# Patient Record
Sex: FEMALE | Race: WHITE | NOT HISPANIC OR LATINO | Employment: OTHER | URBAN - METROPOLITAN AREA
[De-identification: names, ages, dates, MRNs, and addresses within clinical notes are randomized per-mention and may not be internally consistent; named-entity substitution may affect disease eponyms.]

---

## 2017-03-16 ENCOUNTER — HOSPITAL ENCOUNTER (OUTPATIENT)
Dept: RADIOLOGY | Facility: MEDICAL CENTER | Age: 62
End: 2017-03-16
Attending: FAMILY MEDICINE
Payer: MEDICARE

## 2017-03-16 DIAGNOSIS — Z13.9 SCREENING: ICD-10-CM

## 2017-03-16 PROCEDURE — 77063 BREAST TOMOSYNTHESIS BI: CPT

## 2017-03-17 ENCOUNTER — HOSPITAL ENCOUNTER (OUTPATIENT)
Dept: LAB | Facility: MEDICAL CENTER | Age: 62
End: 2017-03-17
Attending: FAMILY MEDICINE
Payer: MEDICARE

## 2017-03-17 LAB
ALBUMIN SERPL BCP-MCNC: 4.1 G/DL (ref 3.2–4.9)
ALBUMIN/GLOB SERPL: 1.2 G/DL
ALP SERPL-CCNC: 112 U/L (ref 30–99)
ALT SERPL-CCNC: 32 U/L (ref 2–50)
ANION GAP SERPL CALC-SCNC: 8 MMOL/L (ref 0–11.9)
AST SERPL-CCNC: 20 U/L (ref 12–45)
BASOPHILS # BLD AUTO: 0.07 K/UL (ref 0–0.12)
BASOPHILS NFR BLD AUTO: 1.1 % (ref 0–1.8)
BILIRUB SERPL-MCNC: 0.6 MG/DL (ref 0.1–1.5)
BUN SERPL-MCNC: 16 MG/DL (ref 8–22)
CALCIUM SERPL-MCNC: 9.1 MG/DL (ref 8.5–10.5)
CHLORIDE SERPL-SCNC: 103 MMOL/L (ref 96–112)
CHOLEST SERPL-MCNC: 194 MG/DL (ref 100–199)
CO2 SERPL-SCNC: 27 MMOL/L (ref 20–33)
CREAT SERPL-MCNC: 1.01 MG/DL (ref 0.5–1.4)
EOSINOPHIL # BLD: 0.14 K/UL (ref 0–0.51)
EOSINOPHIL NFR BLD AUTO: 2.3 % (ref 0–6.9)
ERYTHROCYTE [DISTWIDTH] IN BLOOD BY AUTOMATED COUNT: 47.8 FL (ref 35.9–50)
EST. AVERAGE GLUCOSE BLD GHB EST-MCNC: 232 MG/DL
GLOBULIN SER CALC-MCNC: 3.4 G/DL (ref 1.9–3.5)
GLUCOSE SERPL-MCNC: 207 MG/DL (ref 65–99)
HBA1C MFR BLD: 9.7 % (ref 0–5.6)
HCT VFR BLD AUTO: 41.8 % (ref 37–47)
HDLC SERPL-MCNC: 68 MG/DL
HGB BLD-MCNC: 13.4 G/DL (ref 12–16)
IMM GRANULOCYTES # BLD AUTO: 0.01 K/UL (ref 0–0.11)
IMM GRANULOCYTES NFR BLD AUTO: 0.2 % (ref 0–0.9)
LDLC SERPL CALC-MCNC: 106 MG/DL
LYMPHOCYTES # BLD: 1.42 K/UL (ref 1–4.8)
LYMPHOCYTES NFR BLD AUTO: 23 % (ref 22–41)
MCH RBC QN AUTO: 30.7 PG (ref 27–33)
MCHC RBC AUTO-ENTMCNC: 32.1 G/DL (ref 33.6–35)
MCV RBC AUTO: 95.7 FL (ref 81.4–97.8)
MONOCYTES # BLD: 0.49 K/UL (ref 0–0.85)
MONOCYTES NFR BLD AUTO: 7.9 % (ref 0–13.4)
NEUTROPHILS # BLD: 4.04 K/UL (ref 2–7.15)
NEUTROPHILS NFR BLD AUTO: 65.5 % (ref 44–72)
NRBC # BLD AUTO: 0 K/UL
NRBC BLD-RTO: 0 /100 WBC
PLATELET # BLD AUTO: 332 K/UL (ref 164–446)
PMV BLD AUTO: 9.9 FL (ref 9–12.9)
POTASSIUM SERPL-SCNC: 4.3 MMOL/L (ref 3.6–5.5)
PROT SERPL-MCNC: 7.5 G/DL (ref 6–8.2)
RBC # BLD AUTO: 4.37 M/UL (ref 4.2–5.4)
SODIUM SERPL-SCNC: 138 MMOL/L (ref 135–145)
TRIGL SERPL-MCNC: 102 MG/DL (ref 0–149)
TSH SERPL DL<=0.005 MIU/L-ACNC: 21.46 UIU/ML (ref 0.3–3.7)
WBC # BLD AUTO: 6.2 K/UL (ref 4.8–10.8)

## 2017-03-17 PROCEDURE — 80061 LIPID PANEL: CPT

## 2017-03-17 PROCEDURE — 83036 HEMOGLOBIN GLYCOSYLATED A1C: CPT

## 2017-03-17 PROCEDURE — 80053 COMPREHEN METABOLIC PANEL: CPT

## 2017-03-17 PROCEDURE — 84443 ASSAY THYROID STIM HORMONE: CPT

## 2017-03-17 PROCEDURE — 85025 COMPLETE CBC W/AUTO DIFF WBC: CPT

## 2017-03-17 PROCEDURE — 36415 COLL VENOUS BLD VENIPUNCTURE: CPT

## 2017-07-13 ENCOUNTER — HOSPITAL ENCOUNTER (OUTPATIENT)
Dept: RADIOLOGY | Facility: MEDICAL CENTER | Age: 62
End: 2017-07-13
Attending: NURSE PRACTITIONER
Payer: MEDICARE

## 2017-07-13 VITALS — WEIGHT: 170 LBS | BODY MASS INDEX: 26.68 KG/M2 | HEIGHT: 67 IN

## 2017-07-13 DIAGNOSIS — M54.41 LEFT-SIDED LOW BACK PAIN WITH RIGHT-SIDED SCIATICA, UNSPECIFIED CHRONICITY: ICD-10-CM

## 2017-07-13 PROCEDURE — A9270 NON-COVERED ITEM OR SERVICE: HCPCS | Performed by: RADIOLOGY

## 2017-07-13 PROCEDURE — 700102 HCHG RX REV CODE 250 W/ 637 OVERRIDE(OP): Performed by: RADIOLOGY

## 2017-07-13 PROCEDURE — 72148 MRI LUMBAR SPINE W/O DYE: CPT

## 2017-07-13 PROCEDURE — 72110 X-RAY EXAM L-2 SPINE 4/>VWS: CPT

## 2017-07-13 RX ORDER — ALPRAZOLAM 1 MG/1
1 TABLET ORAL
Status: COMPLETED | OUTPATIENT
Start: 2017-07-13 | End: 2017-07-13

## 2017-07-13 RX ADMIN — ALPRAZOLAM 1 MG: 1 TABLET ORAL at 10:42

## 2017-07-13 ASSESSMENT — PAIN SCALES - GENERAL
PAINLEVEL_OUTOF10: 3
PAINLEVEL_OUTOF10: 3

## 2017-07-13 NOTE — IP AVS SNAPSHOT
" <p align=\"LEFT\"><IMG SRC=\"//[email or web address]$/[email or web address]\" alt=\"Image\" WIDTH=\"50%\" HEIGHT=\"200\" BORDER=\"\"></p>      `           Anu Manuel   MRN: [id number]    Department:  [facility] MRI [address]   Date of Visit:              `  Discharge Instructions       MRI ADULT DISCHARGE INSTRUCTIONS    You have been medicated today for your scan. Please follow the instructions below to ensure your safe recovery. If you have any questions or problems, feel free to call us at [phone] or [phone].     1.   Have someone stay with you to assist you as needed.    2.   Do not drive or operate any mechanical devices.    3.   Do not perform any activity that requires concentration. Make no major decisions over the next 24 hours.     4.   Be careful changing positions from laying down to sitting or standing, as you may become dizzy.     5.   Do not drink alcohol for 48 hours.    6.   There are no restrictions for eating your normal meals. Drink fluids.    7.   You may continue your usual medications for pain, tranquilizers, muscle relaxants or sedatives when awake.     8.   Tomorrow, you may continue your normal daily activities.     9.   Pressure dressing on 10 - 15 minutes. If swelling or bleeding occurs when removed, continue placing direct pressure on injection site for another 5 minutes, or until bleeding stops.   Alprazolam (XANAX) tablet  What is this medicine?  You were prescribed ALPRAZOLAM (al PRAY tanja vazquez) for the procedure you had today. This medication is a benzodiazepine. It is used to treat anxiety and panic attacks.  This medicine may be used for other purposes; ask your health care provider or pharmacist if you have questions.  What side effects may I notice from receiving this medicine?  Side effects that you should report to your doctor or health care professional as soon as possible:  • allergic reactions like skin rash, itching or hives, swelling of the face, lips, or tongue  • confusion, " forgetfulness  • depression  • difficulty sleeping  • difficulty speaking  • feeling faint or lightheaded, falls  • mood changes, excitability or aggressive behavior  • muscle cramps  • trouble passing urine or change in the amount of urine  • unusually weak or tired  Side effects that usually do not require medical attention (report to your doctor or health care professional if they continue or are bothersome):  • changes in appetite  • change in sex drive or performance  This list may not describe all possible side effects. Call your doctor for medical advice about side effects. You may report side effects to FDA at 5-172-UDP-4511.      I have been informed of and understand the above discharge instructions.      `       Diet / Nutrition:    Follow any diet instructions given to you by your doctor or the dietician, including how much salt (sodium) you are allowed each day.    If you are overweight, talk to your doctor about a weight reduction plan.    Activity:    Remain physically active following your doctor's instructions about exercise and activity.    Rest often.     Any time you become even a little tired or short of breath, SIT DOWN and rest.    Worsening Symptoms:    Report any of the following signs and symptoms to the doctor's office immediately:    *Pain of jaw, arm, or neck  *Chest pain not relieved by medication                               *Dizziness or loss of consciousness  *Difficulty breathing even when at rest   *More tired than usual                                       *Bleeding drainage or swelling of surgical site  *Swelling of feet, ankles, legs or stomach                 *Fever (>100ºF)  *Pink or blood tinged sputum  *Weight gain (3lbs/day or 5lbs /week)           *Shock from internal defibrillator (if applicable)  *Palpitations or irregular heartbeats                *Cool and/or numb extremities    Stroke Awareness    Common Risk Factors for Stroke include:    Age  Atrial  Fibrillation  Carotid Artery Stenosis  Diabetes Mellitus  Excessive alcohol consumption  High blood pressure  Overweight   Physical inactivity  Smoking    Warning signs and symptoms of a stroke include:    *Sudden numbness or weakness of the face, arm or leg (especially on one side of the body).  *Sudden confusion, trouble speaking or understanding.  *Sudden trouble seeing in one or both eyes.  *Sudden trouble walking, dizziness, loss of balance or coordination.Sudden severe headache with no known cause.    It is very important to get treatment quickly when a stroke occurs. If you experience any of the above warning signs, call 911 immediately.                    `     Quit Smoking / Tobacco Use:    I understand the use of any tobacco products increases my chance of suffering from future heart disease or stroke and could cause other illnesses which may shorten my life. Quitting the use of tobacco products is the single most important thing I can do to improve my health. For further information on smoking / tobacco cessation call a Toll Free Quit Line at 1-874.293.8948 (*[employer] [place]) or 1-402.661.8968 (American [employer] Association) or you can access the web based program at www.lungPawSpot.org.    [place] Tobacco Users Help Line:  (910) 304-4310       Toll Free: 1-302.335.1049  Quit Tobacco Program [facility] (352)063-0272    Crisis Hotline:    [place] Crisis Hotline:  6-245-RQWOTCM or 1-623.999.5856    [place] Crisis Hotline:    1-833.759.9925 or 170-026-1624    Discharge Survey:   Thank you for choosing [facility]. We hope we did everything we could to make your hospital stay a pleasant one. You may be receiving a phone survey and we would appreciate your time and participation in answering the questions. Your input is very valuable to us in our efforts to improve our service to our patients and their families.        My signature on this form indicates that:    1. I have reviewed  and understand the above information.  2. My questions regarding this information have been answered to my satisfaction.  3. I have formulated a plan with my discharge nurse to obtain my prescribed medications for home.                   `           Patient or Caregiver Signature:  ____________________________________________________________    Date:  ____________________________________________________________       `

## 2017-09-07 ENCOUNTER — HOSPITAL ENCOUNTER (OUTPATIENT)
Dept: RADIOLOGY | Facility: REHABILITATION | Age: 62
End: 2017-09-07
Attending: PHYSICAL MEDICINE & REHABILITATION
Payer: MEDICARE

## 2017-09-07 ENCOUNTER — HOSPITAL ENCOUNTER (OUTPATIENT)
Dept: PAIN MANAGEMENT | Facility: REHABILITATION | Age: 62
End: 2017-09-07
Attending: PHYSICAL MEDICINE & REHABILITATION
Payer: MEDICARE

## 2017-09-07 VITALS
OXYGEN SATURATION: 98 % | BODY MASS INDEX: 25.33 KG/M2 | HEART RATE: 90 BPM | DIASTOLIC BLOOD PRESSURE: 80 MMHG | SYSTOLIC BLOOD PRESSURE: 110 MMHG | RESPIRATION RATE: 16 BRPM | HEIGHT: 66 IN | WEIGHT: 157.63 LBS

## 2017-09-07 PROCEDURE — 700111 HCHG RX REV CODE 636 W/ 250 OVERRIDE (IP)

## 2017-09-07 PROCEDURE — 99152 MOD SED SAME PHYS/QHP 5/>YRS: CPT

## 2017-09-07 PROCEDURE — 700117 HCHG RX CONTRAST REV CODE 255

## 2017-09-07 PROCEDURE — 64484 NJX AA&/STRD TFRM EPI L/S EA: CPT

## 2017-09-07 PROCEDURE — 64483 NJX AA&/STRD TFRM EPI L/S 1: CPT

## 2017-09-07 RX ORDER — MIDAZOLAM HYDROCHLORIDE 1 MG/ML
INJECTION INTRAMUSCULAR; INTRAVENOUS
Status: COMPLETED
Start: 2017-09-07 | End: 2017-09-07

## 2017-09-07 RX ORDER — METHYLPREDNISOLONE ACETATE 80 MG/ML
INJECTION, SUSPENSION INTRA-ARTICULAR; INTRALESIONAL; INTRAMUSCULAR; SOFT TISSUE
Status: COMPLETED
Start: 2017-09-07 | End: 2017-09-07

## 2017-09-07 RX ORDER — LIDOCAINE HYDROCHLORIDE 10 MG/ML
INJECTION, SOLUTION EPIDURAL; INFILTRATION; INTRACAUDAL; PERINEURAL
Status: COMPLETED
Start: 2017-09-07 | End: 2017-09-07

## 2017-09-07 RX ADMIN — FENTANYL CITRATE 50 MCG: 50 INJECTION, SOLUTION INTRAMUSCULAR; INTRAVENOUS at 12:50

## 2017-09-07 RX ADMIN — IOHEXOL 6 ML: 240 INJECTION, SOLUTION INTRATHECAL; INTRAVASCULAR; INTRAVENOUS; ORAL at 12:58

## 2017-09-07 RX ADMIN — MIDAZOLAM HYDROCHLORIDE 1 MG: 1 INJECTION, SOLUTION INTRAMUSCULAR; INTRAVENOUS at 12:50

## 2017-09-07 RX ADMIN — METHYLPREDNISOLONE ACETATE 160 MG: 80 INJECTION, SUSPENSION INTRA-ARTICULAR; INTRALESIONAL; INTRAMUSCULAR; SOFT TISSUE at 12:45

## 2017-09-07 ASSESSMENT — PAIN SCALES - GENERAL
PAINLEVEL_OUTOF10: 8
PAINLEVEL_OUTOF10: 2

## 2017-09-07 NOTE — PROGRESS NOTES
Current medications reviewed with pt, see medications reconciliation form. Pt devaughn taking ASA or other blood thinners or anti-inflammatories.  Pt has a ride post-procedure   .  Printed and verbal discharge instructions given to pt who verbalized understanding.

## 2017-09-07 NOTE — PROCEDURES
DATE OF PROCEDURE:  9/7/2017     ADMITTING DIAGNOSIS:  Lumbar radiculitis at L4-L5 distribution.     POSTOPERATIVE DIAGNOSIS:  Lumbar radiculitis at L4-L5 distribution.     PROCEDURE PERFORMED:  Left L4 and a left L5 selective steroid nerve root block    fluoroscopy as well as conscious sedation.     DESCRIPTION OF PROCEDURE:  Patient was escorted to the procedure room, placed    in a prone position.  The fluoroscopic unit was then moved into position and    placed a good angle for the nerve blocks at L4 and L5 on the left.  The skin    was then cleansed and prepped in normal sterile fashion.  She was given IV    Versed as well as IV fentanyl.  She was monitored throughout the   procedure with O2 sats respiratory and clinically as well as postoperatively    and given postop instructions.     PROCEDURE:  The area cleansed and prepped in normal sterile fashion.  Skin    wheal was then raised as well as tract of 1% lidocaine was used at about 1.5    mg after negative aspiration.  The  spinal needles were then introduced    using guided down to the neural foramen of the L4-L5 and L5-S1.  This was    confirmed with omnipaque 0.5 mL for each level after negative aspiration.     Good neurogram was noted and the injectate of 80 mg of Depo-Medrol and 4 mL of   1% lidocaine was injected at each site.  She had good reproduction of pain    into her buttock and down the leg all the way down to the foot.  The needles    were then withdrawn into the paraspinal muscles and another 1 mL of 1%    lidocaine was injected in each of them after negative aspiration and she was    escorted to postoperative room after she was cleansed and wiped off and    Band-Aids placed.  She was monitored and was clinically stable and was given    postoperative instructions as well as followup for the procedure as well as    conscious sedation.        ____________________________________     M ALISTAIR GUAJARDO MD

## 2017-11-27 ENCOUNTER — HOSPITAL ENCOUNTER (OUTPATIENT)
Dept: LAB | Facility: MEDICAL CENTER | Age: 62
End: 2017-11-27
Attending: FAMILY MEDICINE
Payer: MEDICARE

## 2017-11-27 LAB
ALBUMIN SERPL BCP-MCNC: 3.5 G/DL (ref 3.2–4.9)
ALBUMIN/GLOB SERPL: 1.3 G/DL
ALP SERPL-CCNC: 88 U/L (ref 30–99)
ALT SERPL-CCNC: 22 U/L (ref 2–50)
ANION GAP SERPL CALC-SCNC: 6 MMOL/L (ref 0–11.9)
AST SERPL-CCNC: 16 U/L (ref 12–45)
BASOPHILS # BLD AUTO: 1.3 % (ref 0–1.8)
BASOPHILS # BLD: 0.07 K/UL (ref 0–0.12)
BILIRUB SERPL-MCNC: 0.4 MG/DL (ref 0.1–1.5)
BUN SERPL-MCNC: 14 MG/DL (ref 8–22)
CALCIUM SERPL-MCNC: 8.8 MG/DL (ref 8.5–10.5)
CHLORIDE SERPL-SCNC: 105 MMOL/L (ref 96–112)
CHOLEST SERPL-MCNC: 150 MG/DL (ref 100–199)
CO2 SERPL-SCNC: 29 MMOL/L (ref 20–33)
CREAT SERPL-MCNC: 0.8 MG/DL (ref 0.5–1.4)
CREAT UR-MCNC: 178.5 MG/DL
EOSINOPHIL # BLD AUTO: 0.26 K/UL (ref 0–0.51)
EOSINOPHIL NFR BLD: 4.9 % (ref 0–6.9)
ERYTHROCYTE [DISTWIDTH] IN BLOOD BY AUTOMATED COUNT: 46.5 FL (ref 35.9–50)
EST. AVERAGE GLUCOSE BLD GHB EST-MCNC: 306 MG/DL
GFR SERPL CREATININE-BSD FRML MDRD: >60 ML/MIN/1.73 M 2
GLOBULIN SER CALC-MCNC: 2.7 G/DL (ref 1.9–3.5)
GLUCOSE SERPL-MCNC: 228 MG/DL (ref 65–99)
HBA1C MFR BLD: 12.3 % (ref 0–5.6)
HCT VFR BLD AUTO: 39.8 % (ref 37–47)
HDLC SERPL-MCNC: 54 MG/DL
HGB BLD-MCNC: 12.9 G/DL (ref 12–16)
IMM GRANULOCYTES # BLD AUTO: 0.01 K/UL (ref 0–0.11)
IMM GRANULOCYTES NFR BLD AUTO: 0.2 % (ref 0–0.9)
LDLC SERPL CALC-MCNC: 80 MG/DL
LYMPHOCYTES # BLD AUTO: 1.78 K/UL (ref 1–4.8)
LYMPHOCYTES NFR BLD: 33.8 % (ref 22–41)
MCH RBC QN AUTO: 31 PG (ref 27–33)
MCHC RBC AUTO-ENTMCNC: 32.4 G/DL (ref 33.6–35)
MCV RBC AUTO: 95.7 FL (ref 81.4–97.8)
MICROALBUMIN UR-MCNC: 3.1 MG/DL
MICROALBUMIN/CREAT UR: 17 MG/G (ref 0–30)
MONOCYTES # BLD AUTO: 0.52 K/UL (ref 0–0.85)
MONOCYTES NFR BLD AUTO: 9.9 % (ref 0–13.4)
NEUTROPHILS # BLD AUTO: 2.62 K/UL (ref 2–7.15)
NEUTROPHILS NFR BLD: 49.9 % (ref 44–72)
NRBC # BLD AUTO: 0 K/UL
NRBC BLD AUTO-RTO: 0 /100 WBC
PLATELET # BLD AUTO: 273 K/UL (ref 164–446)
PMV BLD AUTO: 10 FL (ref 9–12.9)
POTASSIUM SERPL-SCNC: 4.2 MMOL/L (ref 3.6–5.5)
PROT SERPL-MCNC: 6.2 G/DL (ref 6–8.2)
RBC # BLD AUTO: 4.16 M/UL (ref 4.2–5.4)
SODIUM SERPL-SCNC: 140 MMOL/L (ref 135–145)
TRIGL SERPL-MCNC: 78 MG/DL (ref 0–149)
WBC # BLD AUTO: 5.3 K/UL (ref 4.8–10.8)

## 2017-11-27 PROCEDURE — 83036 HEMOGLOBIN GLYCOSYLATED A1C: CPT

## 2017-11-27 PROCEDURE — 82570 ASSAY OF URINE CREATININE: CPT

## 2017-11-27 PROCEDURE — 80053 COMPREHEN METABOLIC PANEL: CPT

## 2017-11-27 PROCEDURE — 36415 COLL VENOUS BLD VENIPUNCTURE: CPT

## 2017-11-27 PROCEDURE — 85025 COMPLETE CBC W/AUTO DIFF WBC: CPT

## 2017-11-27 PROCEDURE — 82043 UR ALBUMIN QUANTITATIVE: CPT

## 2017-11-27 PROCEDURE — 80061 LIPID PANEL: CPT

## 2017-12-28 ENCOUNTER — HOSPITAL ENCOUNTER (OUTPATIENT)
Dept: RADIOLOGY | Facility: REHABILITATION | Age: 62
End: 2017-12-28
Attending: PHYSICAL MEDICINE & REHABILITATION
Payer: MEDICARE

## 2017-12-28 ENCOUNTER — HOSPITAL ENCOUNTER (OUTPATIENT)
Dept: PAIN MANAGEMENT | Facility: REHABILITATION | Age: 62
End: 2017-12-28
Attending: PHYSICAL MEDICINE & REHABILITATION
Payer: MEDICARE

## 2017-12-28 VITALS
OXYGEN SATURATION: 97 % | RESPIRATION RATE: 18 BRPM | TEMPERATURE: 98 F | SYSTOLIC BLOOD PRESSURE: 133 MMHG | BODY MASS INDEX: 24.73 KG/M2 | DIASTOLIC BLOOD PRESSURE: 84 MMHG | WEIGHT: 153.88 LBS | HEART RATE: 86 BPM | HEIGHT: 66 IN

## 2017-12-28 PROCEDURE — 64483 NJX AA&/STRD TFRM EPI L/S 1: CPT

## 2017-12-28 PROCEDURE — 64484 NJX AA&/STRD TFRM EPI L/S EA: CPT

## 2017-12-28 PROCEDURE — 99153 MOD SED SAME PHYS/QHP EA: CPT

## 2017-12-28 PROCEDURE — 700111 HCHG RX REV CODE 636 W/ 250 OVERRIDE (IP)

## 2017-12-28 PROCEDURE — 700117 HCHG RX CONTRAST REV CODE 255

## 2017-12-28 PROCEDURE — 99152 MOD SED SAME PHYS/QHP 5/>YRS: CPT

## 2017-12-28 RX ORDER — MIDAZOLAM HYDROCHLORIDE 1 MG/ML
INJECTION INTRAMUSCULAR; INTRAVENOUS
Status: COMPLETED
Start: 2017-12-28 | End: 2017-12-28

## 2017-12-28 RX ORDER — METHYLPREDNISOLONE ACETATE 80 MG/ML
INJECTION, SUSPENSION INTRA-ARTICULAR; INTRALESIONAL; INTRAMUSCULAR; SOFT TISSUE
Status: COMPLETED
Start: 2017-12-28 | End: 2017-12-28

## 2017-12-28 RX ORDER — LIDOCAINE HYDROCHLORIDE 10 MG/ML
INJECTION, SOLUTION EPIDURAL; INFILTRATION; INTRACAUDAL; PERINEURAL
Status: COMPLETED
Start: 2017-12-28 | End: 2017-12-28

## 2017-12-28 RX ORDER — TRAMADOL HYDROCHLORIDE 50 MG/1
50 TABLET ORAL EVERY 4 HOURS PRN
COMMUNITY
End: 2018-03-15

## 2017-12-28 RX ADMIN — FENTANYL CITRATE 75 MCG: 50 INJECTION, SOLUTION INTRAMUSCULAR; INTRAVENOUS at 14:16

## 2017-12-28 RX ADMIN — METHYLPREDNISOLONE ACETATE 80 MG: 80 INJECTION, SUSPENSION INTRA-ARTICULAR; INTRALESIONAL; INTRAMUSCULAR; SOFT TISSUE at 14:20

## 2017-12-28 RX ADMIN — MIDAZOLAM HYDROCHLORIDE 1 MG: 1 INJECTION, SOLUTION INTRAMUSCULAR; INTRAVENOUS at 14:14

## 2017-12-28 RX ADMIN — IOHEXOL 8 ML: 240 INJECTION, SOLUTION INTRATHECAL; INTRAVASCULAR; INTRAVENOUS; ORAL at 14:16

## 2017-12-28 ASSESSMENT — PAIN SCALES - GENERAL
PAINLEVEL_OUTOF10: 0
PAINLEVEL_OUTOF10: 0
PAINLEVEL_OUTOF10: 3

## 2017-12-28 NOTE — PROGRESS NOTES
Received ambulatory accompanied by RN. Handoff reported by DELANO Montes De Oca RN. Patient awake, alert and verbally responsive. Tolerated fluids well. Vital signs monitored every 15 minutes ( see epic doc. VS template) and stable within patient usual range. Patient able to  move all extremities without difficulty voluntarily and on command. Reviewed home care instructions and understood by patient. There's no evidence of procedural complication noted.

## 2017-12-28 NOTE — PROGRESS NOTES
Medication reconciliation reviewed with patient. Denied taking any blood thinners and any  any anti- inflammatories medications. .Patient had a  Nicanor/ ,UBER .Home care form and verbal  instruction given to patient and verbalized understanding. Dr. Escamilla made aware . Blood sugar 93 mg./dl at 1330 PM. Hand off reported to DELANO Montes De Oca RN.

## 2017-12-28 NOTE — PROCEDURES
DATE OF PROCEDURE:  12/28/2017     ADMITTING DIAGNOSIS:  Lumbar radiculitis at L4-L5 distribution right     POSTOPERATIVE DIAGNOSIS:  Lumbar radiculitis at L4-L5 right LE distribution.     PROCEDURE PERFORMED:  right L4 and a R L5 selective steroid nerve root block    fluoroscopy as well as conscious sedation.     DESCRIPTION OF PROCEDURE:  Patient was escorted to the procedure room, placed    in a prone position.  The fluoroscopic unit was then moved into position and    placed a good angle for the nerve blocks at L4 and L5 on the left.  The skin    was then cleansed and prepped in normal sterile fashion.  She was given IV    Versed as well as IV fentanyl.  She was monitored throughout the   procedure with O2 sats respiratory and clinically as well as postoperatively    and given postop instructions.     PROCEDURE:  The area cleansed and prepped in normal sterile fashion.  Skin    wheal was then raised as well as tract of 1% lidocaine was used at about 1.5    mg after negative aspiration.  The  spinal needles were then introduced    using guided down to the neural foramen of the L4-L5 and L5-S1.  This was    confirmed with omnipaque 0.5 mL for each level after negative aspiration.     Good neurogram was noted and the injectate of 80 mg of Depo-Medrol and 4 mL of   1% lidocaine was injected at each site.  She had good reproduction of pain    into her buttock and down the leg all the way down to the foot.  The needles    were then withdrawn into the paraspinal muscles and another 1 mL of 1%    lidocaine was injected in each of them after negative aspiration and she was    escorted to postoperative room after she was cleansed and wiped off and    Band-Aids placed.  She was monitored and was clinically stable and was given    postoperative instructions as well as followup for the procedure as well as    conscious sedation.        ____________________________________     M ALISTAIR GUAJARDO MD

## 2017-12-28 NOTE — PROGRESS NOTES
During time out Allergies, BS(93 at 1330) and confirmation of procedure and side(Right) all agreed upon. Tolerated procedure. Ambulated  off report given to Jhoana Altman RN

## 2017-12-30 ENCOUNTER — HOSPITAL ENCOUNTER (EMERGENCY)
Facility: MEDICAL CENTER | Age: 62
End: 2017-12-30
Payer: MEDICARE

## 2017-12-30 VITALS
SYSTOLIC BLOOD PRESSURE: 153 MMHG | WEIGHT: 153.44 LBS | RESPIRATION RATE: 16 BRPM | OXYGEN SATURATION: 99 % | DIASTOLIC BLOOD PRESSURE: 80 MMHG | BODY MASS INDEX: 24.66 KG/M2 | HEIGHT: 66 IN | TEMPERATURE: 97.4 F | HEART RATE: 83 BPM

## 2017-12-30 PROCEDURE — 302449 STATCHG TRIAGE ONLY (STATISTIC)

## 2017-12-30 ASSESSMENT — PAIN SCALES - GENERAL: PAINLEVEL_OUTOF10: 5

## 2017-12-31 NOTE — ED NOTES
Pt no longer wishes to wait. Pt states will return if symptoms worsen. Signed AMA form with chart.

## 2017-12-31 NOTE — ED NOTES
"Chief Complaint   Patient presents with   • Low Back Pain     pt had a nerve block on 12/28 and was initally unable to walk, now able to walk but having neck pain on both sides that she describes as burning and very painful.     Blood pressure 153/80, pulse 83, temperature 36.3 °C (97.4 °F), temperature source Temporal, resp. rate 16, height 1.676 m (5' 6\"), weight 69.6 kg (153 lb 7 oz), last menstrual period 04/29/2005, SpO2 99 %.    Pt informed of wait times. Educated on triage process.  Asked to return to triage RN for any new or worsening of symptoms. Thanked for patience.        "

## 2018-02-14 ENCOUNTER — HOSPITAL ENCOUNTER (OUTPATIENT)
Dept: RADIOLOGY | Facility: MEDICAL CENTER | Age: 63
End: 2018-02-14
Attending: INTERNAL MEDICINE
Payer: MEDICARE

## 2018-02-14 DIAGNOSIS — R06.02 SHORTNESS OF BREATH: ICD-10-CM

## 2018-02-14 DIAGNOSIS — M81.0 SENILE OSTEOPOROSIS: ICD-10-CM

## 2018-02-14 PROCEDURE — 71250 CT THORAX DX C-: CPT

## 2018-02-14 PROCEDURE — 77080 DXA BONE DENSITY AXIAL: CPT

## 2018-02-22 ENCOUNTER — HOSPITAL ENCOUNTER (OUTPATIENT)
Dept: LAB | Facility: MEDICAL CENTER | Age: 63
End: 2018-02-22
Attending: NURSE PRACTITIONER
Payer: MEDICARE

## 2018-02-22 LAB
25(OH)D3 SERPL-MCNC: 30 NG/ML (ref 30–100)
ALBUMIN SERPL BCP-MCNC: 4 G/DL (ref 3.2–4.9)
ALBUMIN/GLOB SERPL: 1.5 G/DL
ALP SERPL-CCNC: 88 U/L (ref 30–99)
ALT SERPL-CCNC: 24 U/L (ref 2–50)
ANION GAP SERPL CALC-SCNC: 5 MMOL/L (ref 0–11.9)
AST SERPL-CCNC: 18 U/L (ref 12–45)
BASOPHILS # BLD AUTO: 1.8 % (ref 0–1.8)
BASOPHILS # BLD: 0.12 K/UL (ref 0–0.12)
BILIRUB SERPL-MCNC: 0.2 MG/DL (ref 0.1–1.5)
BUN SERPL-MCNC: 22 MG/DL (ref 8–22)
CALCIUM SERPL-MCNC: 9 MG/DL (ref 8.5–10.5)
CHLORIDE SERPL-SCNC: 109 MMOL/L (ref 96–112)
CHOLEST SERPL-MCNC: 170 MG/DL (ref 100–199)
CO2 SERPL-SCNC: 28 MMOL/L (ref 20–33)
CREAT SERPL-MCNC: 0.84 MG/DL (ref 0.5–1.4)
EOSINOPHIL # BLD AUTO: 0.15 K/UL (ref 0–0.51)
EOSINOPHIL NFR BLD: 2.2 % (ref 0–6.9)
ERYTHROCYTE [DISTWIDTH] IN BLOOD BY AUTOMATED COUNT: 48.7 FL (ref 35.9–50)
EST. AVERAGE GLUCOSE BLD GHB EST-MCNC: 278 MG/DL
FOLATE SERPL-MCNC: 17.9 NG/ML
GLOBULIN SER CALC-MCNC: 2.6 G/DL (ref 1.9–3.5)
GLUCOSE SERPL-MCNC: 168 MG/DL (ref 65–99)
HBA1C MFR BLD: 11.3 % (ref 0–5.6)
HCT VFR BLD AUTO: 40.2 % (ref 37–47)
HDLC SERPL-MCNC: 55 MG/DL
HGB BLD-MCNC: 12.8 G/DL (ref 12–16)
IMM GRANULOCYTES # BLD AUTO: 0.01 K/UL (ref 0–0.11)
IMM GRANULOCYTES NFR BLD AUTO: 0.1 % (ref 0–0.9)
LDLC SERPL CALC-MCNC: 97 MG/DL
LYMPHOCYTES # BLD AUTO: 1.45 K/UL (ref 1–4.8)
LYMPHOCYTES NFR BLD: 21.2 % (ref 22–41)
MAGNESIUM SERPL-MCNC: 2 MG/DL (ref 1.5–2.5)
MCH RBC QN AUTO: 31.1 PG (ref 27–33)
MCHC RBC AUTO-ENTMCNC: 31.8 G/DL (ref 33.6–35)
MCV RBC AUTO: 97.6 FL (ref 81.4–97.8)
MONOCYTES # BLD AUTO: 0.56 K/UL (ref 0–0.85)
MONOCYTES NFR BLD AUTO: 8.2 % (ref 0–13.4)
NEUTROPHILS # BLD AUTO: 4.54 K/UL (ref 2–7.15)
NEUTROPHILS NFR BLD: 66.5 % (ref 44–72)
NRBC # BLD AUTO: 0 K/UL
NRBC BLD-RTO: 0 /100 WBC
PLATELET # BLD AUTO: 328 K/UL (ref 164–446)
PMV BLD AUTO: 9.6 FL (ref 9–12.9)
POTASSIUM SERPL-SCNC: 4.4 MMOL/L (ref 3.6–5.5)
PROT SERPL-MCNC: 6.6 G/DL (ref 6–8.2)
RBC # BLD AUTO: 4.12 M/UL (ref 4.2–5.4)
SODIUM SERPL-SCNC: 142 MMOL/L (ref 135–145)
TRIGL SERPL-MCNC: 90 MG/DL (ref 0–149)
TSH SERPL DL<=0.005 MIU/L-ACNC: 0.34 UIU/ML (ref 0.38–5.33)
VIT B12 SERPL-MCNC: 1395 PG/ML (ref 211–911)
WBC # BLD AUTO: 6.8 K/UL (ref 4.8–10.8)

## 2018-02-22 PROCEDURE — 84207 ASSAY OF VITAMIN B-6: CPT

## 2018-02-22 PROCEDURE — 36415 COLL VENOUS BLD VENIPUNCTURE: CPT

## 2018-02-22 PROCEDURE — 82607 VITAMIN B-12: CPT

## 2018-02-22 PROCEDURE — 82306 VITAMIN D 25 HYDROXY: CPT

## 2018-02-22 PROCEDURE — 83735 ASSAY OF MAGNESIUM: CPT

## 2018-02-22 PROCEDURE — 80061 LIPID PANEL: CPT

## 2018-02-22 PROCEDURE — 84425 ASSAY OF VITAMIN B-1: CPT

## 2018-02-22 PROCEDURE — 83036 HEMOGLOBIN GLYCOSYLATED A1C: CPT

## 2018-02-22 PROCEDURE — 82746 ASSAY OF FOLIC ACID SERUM: CPT

## 2018-02-22 PROCEDURE — 84443 ASSAY THYROID STIM HORMONE: CPT

## 2018-02-22 PROCEDURE — 85025 COMPLETE CBC W/AUTO DIFF WBC: CPT

## 2018-02-22 PROCEDURE — 80053 COMPREHEN METABOLIC PANEL: CPT

## 2018-02-25 LAB — VIT B6 SERPL-MCNC: 155.8 NMOL/L (ref 20–125)

## 2018-02-26 LAB — VIT B1 BLD-MCNC: 251 NMOL/L (ref 70–180)

## 2018-03-15 ENCOUNTER — RESOLUTE PROFESSIONAL BILLING HOSPITAL PROF FEE (OUTPATIENT)
Dept: HOSPITALIST | Facility: MEDICAL CENTER | Age: 63
End: 2018-03-15
Payer: MEDICARE

## 2018-03-15 ENCOUNTER — APPOINTMENT (OUTPATIENT)
Dept: RADIOLOGY | Facility: MEDICAL CENTER | Age: 63
End: 2018-03-15
Attending: EMERGENCY MEDICINE
Payer: MEDICARE

## 2018-03-15 ENCOUNTER — HOSPITAL ENCOUNTER (OUTPATIENT)
Facility: MEDICAL CENTER | Age: 63
End: 2018-03-16
Attending: EMERGENCY MEDICINE | Admitting: INTERNAL MEDICINE
Payer: MEDICARE

## 2018-03-15 PROBLEM — L03.90 CELLULITIS: Status: ACTIVE | Noted: 2018-03-15

## 2018-03-15 PROBLEM — G89.29 CHRONIC PAIN OF RIGHT LOWER EXTREMITY: Status: ACTIVE | Noted: 2018-03-15

## 2018-03-15 PROBLEM — M79.604 CHRONIC PAIN OF RIGHT LOWER EXTREMITY: Status: ACTIVE | Noted: 2018-03-15

## 2018-03-15 LAB
ALBUMIN SERPL BCP-MCNC: 3.6 G/DL (ref 3.2–4.9)
ALBUMIN/GLOB SERPL: 1.3 G/DL
ALP SERPL-CCNC: 131 U/L (ref 30–99)
ALT SERPL-CCNC: 55 U/L (ref 2–50)
ANION GAP SERPL CALC-SCNC: 6 MMOL/L (ref 0–11.9)
AST SERPL-CCNC: 32 U/L (ref 12–45)
BASOPHILS # BLD AUTO: 1 % (ref 0–1.8)
BASOPHILS # BLD: 0.06 K/UL (ref 0–0.12)
BILIRUB SERPL-MCNC: 0.2 MG/DL (ref 0.1–1.5)
BUN SERPL-MCNC: 19 MG/DL (ref 8–22)
CALCIUM SERPL-MCNC: 8.7 MG/DL (ref 8.5–10.5)
CHLORIDE SERPL-SCNC: 106 MMOL/L (ref 96–112)
CO2 SERPL-SCNC: 27 MMOL/L (ref 20–33)
CREAT SERPL-MCNC: 1.06 MG/DL (ref 0.5–1.4)
CRP SERPL HS-MCNC: 0.91 MG/DL (ref 0–0.75)
EOSINOPHIL # BLD AUTO: 0.3 K/UL (ref 0–0.51)
EOSINOPHIL NFR BLD: 4.8 % (ref 0–6.9)
ERYTHROCYTE [DISTWIDTH] IN BLOOD BY AUTOMATED COUNT: 47.8 FL (ref 35.9–50)
GLOBULIN SER CALC-MCNC: 2.7 G/DL (ref 1.9–3.5)
GLUCOSE BLD-MCNC: 105 MG/DL (ref 65–99)
GLUCOSE BLD-MCNC: 310 MG/DL (ref 65–99)
GLUCOSE SERPL-MCNC: 281 MG/DL (ref 65–99)
HCT VFR BLD AUTO: 34.8 % (ref 37–47)
HGB BLD-MCNC: 11.2 G/DL (ref 12–16)
IMM GRANULOCYTES # BLD AUTO: 0.02 K/UL (ref 0–0.11)
IMM GRANULOCYTES NFR BLD AUTO: 0.3 % (ref 0–0.9)
LYMPHOCYTES # BLD AUTO: 1.78 K/UL (ref 1–4.8)
LYMPHOCYTES NFR BLD: 28.6 % (ref 22–41)
MCH RBC QN AUTO: 31.1 PG (ref 27–33)
MCHC RBC AUTO-ENTMCNC: 32.2 G/DL (ref 33.6–35)
MCV RBC AUTO: 96.7 FL (ref 81.4–97.8)
MONOCYTES # BLD AUTO: 0.56 K/UL (ref 0–0.85)
MONOCYTES NFR BLD AUTO: 9 % (ref 0–13.4)
NEUTROPHILS # BLD AUTO: 3.51 K/UL (ref 2–7.15)
NEUTROPHILS NFR BLD: 56.3 % (ref 44–72)
NRBC # BLD AUTO: 0 K/UL
NRBC BLD-RTO: 0 /100 WBC
PLATELET # BLD AUTO: 286 K/UL (ref 164–446)
PMV BLD AUTO: 9.5 FL (ref 9–12.9)
POTASSIUM SERPL-SCNC: 4.8 MMOL/L (ref 3.6–5.5)
PROT SERPL-MCNC: 6.3 G/DL (ref 6–8.2)
RBC # BLD AUTO: 3.6 M/UL (ref 4.2–5.4)
SODIUM SERPL-SCNC: 139 MMOL/L (ref 135–145)
WBC # BLD AUTO: 6.2 K/UL (ref 4.8–10.8)

## 2018-03-15 PROCEDURE — G0378 HOSPITAL OBSERVATION PER HR: HCPCS

## 2018-03-15 PROCEDURE — 36415 COLL VENOUS BLD VENIPUNCTURE: CPT

## 2018-03-15 PROCEDURE — 93971 EXTREMITY STUDY: CPT

## 2018-03-15 PROCEDURE — 86140 C-REACTIVE PROTEIN: CPT

## 2018-03-15 PROCEDURE — 96372 THER/PROPH/DIAG INJ SC/IM: CPT | Mod: XU

## 2018-03-15 PROCEDURE — 700105 HCHG RX REV CODE 258: Performed by: EMERGENCY MEDICINE

## 2018-03-15 PROCEDURE — 82962 GLUCOSE BLOOD TEST: CPT | Mod: 91

## 2018-03-15 PROCEDURE — 700117 HCHG RX CONTRAST REV CODE 255: Performed by: EMERGENCY MEDICINE

## 2018-03-15 PROCEDURE — 73701 CT LOWER EXTREMITY W/DYE: CPT | Mod: LT

## 2018-03-15 PROCEDURE — 96365 THER/PROPH/DIAG IV INF INIT: CPT

## 2018-03-15 PROCEDURE — 96366 THER/PROPH/DIAG IV INF ADDON: CPT

## 2018-03-15 PROCEDURE — 700111 HCHG RX REV CODE 636 W/ 250 OVERRIDE (IP): Performed by: EMERGENCY MEDICINE

## 2018-03-15 PROCEDURE — A9270 NON-COVERED ITEM OR SERVICE: HCPCS | Performed by: EMERGENCY MEDICINE

## 2018-03-15 PROCEDURE — 85025 COMPLETE CBC W/AUTO DIFF WBC: CPT

## 2018-03-15 PROCEDURE — 700102 HCHG RX REV CODE 250 W/ 637 OVERRIDE(OP): Performed by: INTERNAL MEDICINE

## 2018-03-15 PROCEDURE — 80053 COMPREHEN METABOLIC PANEL: CPT

## 2018-03-15 PROCEDURE — 99220 PR INITIAL OBSERVATION CARE,LEVL III: CPT | Performed by: INTERNAL MEDICINE

## 2018-03-15 PROCEDURE — 96367 TX/PROPH/DG ADDL SEQ IV INF: CPT

## 2018-03-15 PROCEDURE — 700102 HCHG RX REV CODE 250 W/ 637 OVERRIDE(OP): Performed by: EMERGENCY MEDICINE

## 2018-03-15 PROCEDURE — 99285 EMERGENCY DEPT VISIT HI MDM: CPT

## 2018-03-15 PROCEDURE — A9270 NON-COVERED ITEM OR SERVICE: HCPCS | Performed by: INTERNAL MEDICINE

## 2018-03-15 PROCEDURE — 700111 HCHG RX REV CODE 636 W/ 250 OVERRIDE (IP): Performed by: INTERNAL MEDICINE

## 2018-03-15 PROCEDURE — 700105 HCHG RX REV CODE 258: Performed by: INTERNAL MEDICINE

## 2018-03-15 RX ORDER — HYDROCODONE BITARTRATE AND ACETAMINOPHEN 7.5; 325 MG/1; MG/1
1-2 TABLET ORAL EVERY 6 HOURS PRN
Status: DISCONTINUED | OUTPATIENT
Start: 2018-03-15 | End: 2018-03-16

## 2018-03-15 RX ORDER — ASPIRIN 81 MG/1
81 TABLET, CHEWABLE ORAL DAILY
Status: DISCONTINUED | OUTPATIENT
Start: 2018-03-15 | End: 2018-03-16 | Stop reason: HOSPADM

## 2018-03-15 RX ORDER — INSULIN GLARGINE 100 [IU]/ML
20 INJECTION, SOLUTION SUBCUTANEOUS NIGHTLY
Status: DISCONTINUED | OUTPATIENT
Start: 2018-03-15 | End: 2018-03-16 | Stop reason: HOSPADM

## 2018-03-15 RX ORDER — NICOTINE 21 MG/24HR
14 PATCH, TRANSDERMAL 24 HOURS TRANSDERMAL
Status: DISCONTINUED | OUTPATIENT
Start: 2018-03-16 | End: 2018-03-16 | Stop reason: HOSPADM

## 2018-03-15 RX ORDER — OXYCODONE HYDROCHLORIDE AND ACETAMINOPHEN 5; 325 MG/1; MG/1
2 TABLET ORAL EVERY 4 HOURS PRN
Status: DISCONTINUED | OUTPATIENT
Start: 2018-03-15 | End: 2018-03-15

## 2018-03-15 RX ORDER — AMOXICILLIN 250 MG
2 CAPSULE ORAL 2 TIMES DAILY
Status: DISCONTINUED | OUTPATIENT
Start: 2018-03-15 | End: 2018-03-16 | Stop reason: HOSPADM

## 2018-03-15 RX ORDER — DEXTROSE MONOHYDRATE 25 G/50ML
25 INJECTION, SOLUTION INTRAVENOUS
Status: DISCONTINUED | OUTPATIENT
Start: 2018-03-15 | End: 2018-03-15

## 2018-03-15 RX ORDER — HEPARIN SODIUM 5000 [USP'U]/ML
5000 INJECTION, SOLUTION INTRAVENOUS; SUBCUTANEOUS EVERY 8 HOURS
Status: DISCONTINUED | OUTPATIENT
Start: 2018-03-15 | End: 2018-03-16 | Stop reason: HOSPADM

## 2018-03-15 RX ORDER — LEVOTHYROXINE SODIUM 175 UG/1
175 TABLET ORAL
Status: DISCONTINUED | OUTPATIENT
Start: 2018-03-16 | End: 2018-03-16 | Stop reason: HOSPADM

## 2018-03-15 RX ORDER — SODIUM CHLORIDE 9 MG/ML
INJECTION, SOLUTION INTRAVENOUS CONTINUOUS
Status: DISCONTINUED | OUTPATIENT
Start: 2018-03-15 | End: 2018-03-16 | Stop reason: HOSPADM

## 2018-03-15 RX ORDER — DEXTROSE MONOHYDRATE 25 G/50ML
25 INJECTION, SOLUTION INTRAVENOUS
Status: DISCONTINUED | OUTPATIENT
Start: 2018-03-15 | End: 2018-03-16 | Stop reason: HOSPADM

## 2018-03-15 RX ORDER — POLYETHYLENE GLYCOL 3350 17 G/17G
1 POWDER, FOR SOLUTION ORAL
Status: DISCONTINUED | OUTPATIENT
Start: 2018-03-15 | End: 2018-03-16 | Stop reason: HOSPADM

## 2018-03-15 RX ORDER — LISINOPRIL 10 MG/1
10 TABLET ORAL
Status: DISCONTINUED | OUTPATIENT
Start: 2018-03-15 | End: 2018-03-16 | Stop reason: HOSPADM

## 2018-03-15 RX ORDER — GABAPENTIN 300 MG/1
600 CAPSULE ORAL
COMMUNITY
End: 2018-10-08

## 2018-03-15 RX ORDER — ASPIRIN 81 MG/1
81 TABLET, CHEWABLE ORAL
Status: ON HOLD | COMMUNITY
End: 2018-10-26

## 2018-03-15 RX ORDER — HYDROCODONE BITARTRATE AND ACETAMINOPHEN 7.5; 325 MG/1; MG/1
1-2 TABLET ORAL EVERY 6 HOURS PRN
COMMUNITY
End: 2018-10-08

## 2018-03-15 RX ORDER — GABAPENTIN 300 MG/1
600 CAPSULE ORAL
Status: DISCONTINUED | OUTPATIENT
Start: 2018-03-15 | End: 2018-03-16 | Stop reason: HOSPADM

## 2018-03-15 RX ORDER — ACETAMINOPHEN 325 MG/1
650 TABLET ORAL EVERY 6 HOURS PRN
Status: DISCONTINUED | OUTPATIENT
Start: 2018-03-15 | End: 2018-03-16 | Stop reason: HOSPADM

## 2018-03-15 RX ORDER — BISACODYL 10 MG
10 SUPPOSITORY, RECTAL RECTAL
Status: DISCONTINUED | OUTPATIENT
Start: 2018-03-15 | End: 2018-03-16 | Stop reason: HOSPADM

## 2018-03-15 RX ADMIN — NICOTINE 14 MG: 14 PATCH, EXTENDED RELEASE TRANSDERMAL at 19:35

## 2018-03-15 RX ADMIN — IOHEXOL 100 ML: 350 INJECTION, SOLUTION INTRAVENOUS at 14:01

## 2018-03-15 RX ADMIN — HYDROCODONE BITARTRATE AND ACETAMINOPHEN 1 TABLET: 7.5; 325 TABLET ORAL at 20:07

## 2018-03-15 RX ADMIN — OXYCODONE HYDROCHLORIDE AND ACETAMINOPHEN 2 TABLET: 5; 325 TABLET ORAL at 14:47

## 2018-03-15 RX ADMIN — HEPARIN SODIUM 5000 UNITS: 5000 INJECTION, SOLUTION INTRAVENOUS; SUBCUTANEOUS at 20:15

## 2018-03-15 RX ADMIN — AMPICILLIN SODIUM AND SULBACTAM SODIUM 3 G: 2; 1 INJECTION, POWDER, FOR SOLUTION INTRAMUSCULAR; INTRAVENOUS at 14:45

## 2018-03-15 RX ADMIN — INSULIN GLARGINE 20 UNITS: 100 INJECTION, SOLUTION SUBCUTANEOUS at 21:15

## 2018-03-15 RX ADMIN — SODIUM CHLORIDE: 9 INJECTION, SOLUTION INTRAVENOUS at 18:24

## 2018-03-15 RX ADMIN — LISINOPRIL 10 MG: 10 TABLET ORAL at 20:08

## 2018-03-15 RX ADMIN — GABAPENTIN 600 MG: 300 CAPSULE ORAL at 21:15

## 2018-03-15 RX ADMIN — VANCOMYCIN HYDROCHLORIDE 1800 MG: 100 INJECTION, POWDER, LYOPHILIZED, FOR SOLUTION INTRAVENOUS at 18:47

## 2018-03-15 RX ADMIN — AMPICILLIN SODIUM AND SULBACTAM SODIUM 3 G: 2; 1 INJECTION, POWDER, FOR SOLUTION INTRAMUSCULAR; INTRAVENOUS at 23:22

## 2018-03-15 RX ADMIN — HYDROCODONE BITARTRATE AND ACETAMINOPHEN 1 TABLET: 7.5; 325 TABLET ORAL at 19:34

## 2018-03-15 ASSESSMENT — ENCOUNTER SYMPTOMS
HEMOPTYSIS: 0
HEADACHES: 0
NECK PAIN: 0
MYALGIAS: 0
CHILLS: 0
BRUISES/BLEEDS EASILY: 0
VOMITING: 0
ABDOMINAL PAIN: 0
SPUTUM PRODUCTION: 0
FEVER: 0
FOCAL WEAKNESS: 0
DEPRESSION: 0
TINGLING: 0
DIZZINESS: 0
LOSS OF CONSCIOUSNESS: 0
NAUSEA: 0
BLURRED VISION: 0
SHORTNESS OF BREATH: 0

## 2018-03-15 ASSESSMENT — PAIN SCALES - GENERAL
PAINLEVEL_OUTOF10: 8
PAINLEVEL_OUTOF10: 1
PAINLEVEL_OUTOF10: 6
PAINLEVEL_OUTOF10: 5
PAINLEVEL_OUTOF10: 7

## 2018-03-15 ASSESSMENT — PATIENT HEALTH QUESTIONNAIRE - PHQ9
SUM OF ALL RESPONSES TO PHQ QUESTIONS 1-9: 0
SUM OF ALL RESPONSES TO PHQ9 QUESTIONS 1 AND 2: 0
1. LITTLE INTEREST OR PLEASURE IN DOING THINGS: NOT AT ALL
2. FEELING DOWN, DEPRESSED, IRRITABLE, OR HOPELESS: NOT AT ALL

## 2018-03-15 ASSESSMENT — LIFESTYLE VARIABLES
EVER_SMOKED: YES
ALCOHOL_USE: NO

## 2018-03-15 NOTE — ED NOTES
"Per lab, \"something went wrong during the procedure for sed rate, I need another purple top.\" Lab endorses they will call phlebotomy.   "

## 2018-03-15 NOTE — ED TRIAGE NOTES
Patient stepped on a belt buckle 5 days ago and injured her left heal, she is a diabetic so she is concerned because it is not healing.  In addition swelling and pain noted to the left leg, 2+ pitting edema, which started today.  She is concerned about a possible blood clot.

## 2018-03-15 NOTE — ED PROVIDER NOTES
"ED Provider Note    Scribed for Denny Rivera D.O. by Priya Cardenas. 3/15/2018  11:36 AM    Primary care provider: Jarrell Yates M.D.  Means of arrival: Walk-in  History obtained from: Patient   History limited by: none    CHIEF COMPLAINT  Chief Complaint   Patient presents with   • Wound Check     left heel    • Leg Swelling     left        HPI  Anu Manuel is a 62 y.o. female with history of insulin dependent diabetes who presents to the Emergency Department after the patient stepped on a belt buckle five days ago. Patient states she now has pain to the bottom of her left heel and notes swelling up her left leg.  She denies any fever or drainage from wound. Patient is able to ambulate on her left leg and foot but it exacerbated her pain. She denies any allergies to antibiotics. Patient takes Lantis at night and hemolog sliding scale during the day. Her blood sugar was 180 before arrival in the ED.     Patient reports pain to her right hip that radiates in her inner thigh and right knee when she stands up that began four months ago worsened in December after she had an epidural.     REVIEW OF SYSTEMS  Pertinent positives include left heel pain, swelling to her left leg. Pertinent negatives include no fever, drainage.  All other systems reviewed and negative.  C.    PAST MEDICAL HISTORY  Past Medical History:   Diagnosis Date   • Anesthesia     \"throat closes up\"\"anxiety\" ?laryngospasm, kept in ICU   • Arthritis     right shoulder, hands   • Cigarette smoker quit 2013   • depression 8/30/2016    denies depression, states has anxiety and panic attacks   • Diabetes mellitus type 1 1989    insulin   • Encounter for long-term (current) use of insulin 9/25/2013   • Hypothyroidism, postsurgical 1970   • Infectious disease     hepatitis C, tested neg.   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, uncontrolled 6/10/2015       SURGICAL HISTORY  Past Surgical History:   Procedure Laterality Date   • PB INJ,FORAMEN,L/S,1 LEVEL Right 8/31/2016    Procedure: INJ-FORAMEN EPI LUM/SAC SNGL L4-5;  Surgeon: Sukhi Godfrey M.D.;  Location: SURGERY [facility];  Service: Pain Management   • LUMBAR LAMINECTOMY DISKECTOMY Right 5/10/2016    Procedure: LUMBAR L4-5 HEMILAMINECTOMY DISKECTOMY ;  Surgeon: Arnold Keyes M.D.;  Location: SURGERY [facility];  Service:    • CERVICAL FUSION POSTERIOR  1/16/2009    Performed by TARA CONTRERAS at [facility]   • HARDWARE REMOVAL NEURO  1/16/2009    Performed by TARA CONTRERAS at [facility]   • NECK EXPLORATION  1/16/2009    Performed by TARA CONTRERAS at [facility]   • SHOULDER ARTHROSCOPY W/ ROTATOR CUFF REPAIR  10/9/08    Performed by SHERLY CASTANEDA at [facility]   • SHOULDER DECOMPRESSION ARTHROSCOPIC  6/17/08    Performed by SHERLY CASTANEDA at [facility]   • CLAVICLE DISTAL EXCISION  6/17/08    Performed by SHERLY CASTANEDA at [facility]   • CERVICAL DISK AND FUSION ANTERIOR  03/12/08   • HYSTERECTOMY, VAGINAL  2006   • APPENDECTOMY  2004   • THYROID LOBECTOMY  1973   • TONSILLECTOMY  1963   • ABDOMINAL HYSTERECTOMY TOTAL     • LUMPECTOMY  1976, 2005    Breast    • LUMPECTOMY          SOCIAL HISTORY  Social History   Substance Use Topics   • Smoking status: Former Smoker     Packs/day: 0.50     Years: 30.00     Quit date: 9/8/2013   • Smokeless tobacco: Current User      Comment: 1 ppd    • Alcohol use No      Comment: pt uses vapor, e-cigarette      History   Drug Use No       FAMILY HISTORY  Family History   Problem Relation Age of Onset   • Hypertension Mother    • Cancer Father        CURRENT MEDICATIONS  Home Medications     Reviewed by Stone Noguera (Pharmacy Tech) on 03/15/18 at  "1447  Med List Status: Partial   Medication Last Dose Status   aspirin (ASA) 81 MG Chew Tab chewable tablet 3/14/2018 Active   HYDROcodone-acetaminophen (NORCO) 7.5-325 MG per tablet 3/14/2018 Active   insulin glargine (LANTUS) 100 UNIT/ML SOLN 3/14/2018 Active   insulin lispro (HUMALOG) 100 UNIT/ML Solution  Active   levothyroxine (SYNTHROID) 175 MCG Tab 3/15/2018 Active   lisinopril (PRINIVIL) 10 MG TABS 3/14/2018 Active   vitamin D (CHOLECALCIFEROL) 1000 UNIT TABS 3/14/2018 Active                ALLERGIES  Allergies   Allergen Reactions   • Ativan Unspecified      Extreme Restless leg  SZO=2556       PHYSICAL EXAM  VITAL SIGNS: /65   Pulse 96   Temp 36.2 °C (97.2 °F)   Resp 16   Ht 1.676 m (5' 6\")   LMP 04/29/2005 (LMP Unknown)   SpO2 96%     Nursing notes and vitals reviewed.  Constitutional: Well developed, Well nourished, No acute distress, Non-toxic appearance.   Eyes: PERRLA, EOMI, Conjunctiva normal, No discharge.   Cardiovascular: Normal heart rate, Normal rhythm, No murmurs, No rubs, No gallops.   Thorax & Lungs: No respiratory distress, No rales, No rhonchi, No wheezing, No chest tenderness.   Abdomen: Bowel sounds normal, Soft, No tenderness, No guarding, No rebound, No masses, No pulsatile masses.   Skin: Warm, Dry, circumferential erythema to the left lower extremity from knee down, she has a puncture wound on the plantar surface of the foot and the calcaneal region with slight fluctuance, no discharge  Musculoskeletal: Intact distal pulses, No edema, No cyanosis, No clubbing. Good range of motion in all major joints. Significant edema and tenderness to the sole of the left foot with a puncture wound in the calcaneal region. Dorsalis pedis and posterior tibial pulses are brisk bilaterally, slight edema is circumferential nonpitting left lower extremity   Neurologic: Alert & oriented x 3, Normal motor function, Normal sensory function, No focal deficits noted.  Psychiatric: " Anxious    DIAGNOSTIC STUDIES/PROCEDURES    LABS  Results for orders placed or performed during the hospital encounter of 03/15/18   COMP METABOLIC PANEL   Result Value Ref Range    Sodium 139 135 - 145 mmol/L    Potassium 4.8 3.6 - 5.5 mmol/L    Chloride 106 96 - 112 mmol/L    Co2 27 20 - 33 mmol/L    Anion Gap 6.0 0.0 - 11.9    Glucose 281 (H) 65 - 99 mg/dL    Bun 19 8 - 22 mg/dL    Creatinine 1.06 0.50 - 1.40 mg/dL    Calcium 8.7 8.5 - 10.5 mg/dL    AST(SGOT) 32 12 - 45 U/L    ALT(SGPT) 55 (H) 2 - 50 U/L    Alkaline Phosphatase 131 (H) 30 - 99 U/L    Total Bilirubin 0.2 0.1 - 1.5 mg/dL    Albumin 3.6 3.2 - 4.9 g/dL    Total Protein 6.3 6.0 - 8.2 g/dL    Globulin 2.7 1.9 - 3.5 g/dL    A-G Ratio 1.3 g/dL   ESTIMATED GFR   Result Value Ref Range    GFR If African American >60 >60 mL/min/1.73 m 2    GFR If Non  53 (A) >60 mL/min/1.73 m 2   CBC WITH DIFFERENTIAL   Result Value Ref Range    WBC 6.2 4.8 - 10.8 K/uL    RBC 3.60 (L) 4.20 - 5.40 M/uL    Hemoglobin 11.2 (L) 12.0 - 16.0 g/dL    Hematocrit 34.8 (L) 37.0 - 47.0 %    MCV 96.7 81.4 - 97.8 fL    MCH 31.1 27.0 - 33.0 pg    MCHC 32.2 (L) 33.6 - 35.0 g/dL    RDW 47.8 35.9 - 50.0 fL    Platelet Count 286 164 - 446 K/uL    MPV 9.5 9.0 - 12.9 fL    Neutrophils-Polys 56.30 44.00 - 72.00 %    Lymphocytes 28.60 22.00 - 41.00 %    Monocytes 9.00 0.00 - 13.40 %    Eosinophils 4.80 0.00 - 6.90 %    Basophils 1.00 0.00 - 1.80 %    Immature Granulocytes 0.30 0.00 - 0.90 %    Nucleated RBC 0.00 /100 WBC    Neutrophils (Absolute) 3.51 2.00 - 7.15 K/uL    Lymphs (Absolute) 1.78 1.00 - 4.80 K/uL    Monos (Absolute) 0.56 0.00 - 0.85 K/uL    Eos (Absolute) 0.30 0.00 - 0.51 K/uL    Baso (Absolute) 0.06 0.00 - 0.12 K/uL    Immature Granulocytes (abs) 0.02 0.00 - 0.11 K/uL    NRBC (Absolute) 0.00 K/uL   CRP QUANTITIVE (NON-CARDIAC)   Result Value Ref Range    Stat C-Reactive Protein 0.91 (H) 0.00 - 0.75 mg/dL       All labs reviewed by me.    RADIOLOGY  CT-FOOT WITH  PLUS RECONS LEFT   Final Result      Edema or cellulitis in the soft tissues most conspicuously along the hindfoot without evidence of abscess      LE VENOUS DUPLEX (Specify in Comments Left, Right Or Bilateral)   Final Result      Negative for DVT  The radiologist's interpretation of all radiological studies have been reviewed by me.    COURSE & MEDICAL DECISION MAKING  Pertinent Labs & Imaging studies reviewed. (See chart for details)    11:36 AM - Patient seen and examined at bedside. Discussed that I will order US and x-ray to further evaluate.  Ordered CT-foot with left, westergren SED rate, CRP Quantitive, LE Venous Duplex, CBC, CMP, APTT, Prothrombin Time to evaluate her symptoms.     12:14 PM Paged Orthopedics.     12:17 PM - I discussed the patient's case and the above findings with Dr. Cardenas (Orthopedics) who agrees to consult.     2:19 PM Paged Hospitalist.     2:20 PM - I discussed the patient's case and the above findings with Dr. Chicas ([facility])  who agrees to admit the patient.     2:28 PM Recheck: Patient re-evaluated at beside.  Discussed patient's condition and treatment plan for admission. Patient's lab and radiology results discussed. The patient understood and is in agreement.     This is a charming 62 y.o. female that presents with cellulitis of the left lower extremity. As concern for possible DVT therefore ultrasound was completed and thankfully is negative. In addition, CT scan of the foot was completed includes an abscess. The patient does have evidence of cellulitis expanding and I'm concerned secondary to her diabetic state. For this reason, she was received Unasyn, admitted to Dr. Chicas for further evaluation and management. The patient does not have a current surgical emergency such as necrotizing fasciitis or abscess.    DISPOSITION:  Patient will be admitted to Dr. Chicas ([facility]) in guarded condition.      FINAL IMPRESSION  Left foot cellulitis  Diabetes   I,  Priya Cardenas (Scribe), am scribing for, and in the presence of, Denny Rivera D.O    Electronically signed by: Priya Cardenas (Scribe), 3/15/2018    I, Denny Rivera D.O. personally performed the services described in this documentation, as scribed by Priya Cardenas in my presence, and it is both accurate and complete.    The note accurately reflects work and decisions made by me.  Denny Rivera  3/15/2018  2:57 PM

## 2018-03-16 ENCOUNTER — PATIENT OUTREACH (OUTPATIENT)
Dept: HEALTH INFORMATION MANAGEMENT | Facility: OTHER | Age: 63
End: 2018-03-16

## 2018-03-16 VITALS
WEIGHT: 160.5 LBS | TEMPERATURE: 97.5 F | BODY MASS INDEX: 25.79 KG/M2 | SYSTOLIC BLOOD PRESSURE: 149 MMHG | RESPIRATION RATE: 14 BRPM | HEIGHT: 66 IN | DIASTOLIC BLOOD PRESSURE: 79 MMHG | HEART RATE: 86 BPM | OXYGEN SATURATION: 94 %

## 2018-03-16 LAB
ANION GAP SERPL CALC-SCNC: 5 MMOL/L (ref 0–11.9)
BASOPHILS # BLD AUTO: 1.3 % (ref 0–1.8)
BASOPHILS # BLD: 0.07 K/UL (ref 0–0.12)
BUN SERPL-MCNC: 17 MG/DL (ref 8–22)
CALCIUM SERPL-MCNC: 7.2 MG/DL (ref 8.5–10.5)
CHLORIDE SERPL-SCNC: 114 MMOL/L (ref 96–112)
CO2 SERPL-SCNC: 24 MMOL/L (ref 20–33)
CREAT SERPL-MCNC: 0.77 MG/DL (ref 0.5–1.4)
EOSINOPHIL # BLD AUTO: 0.33 K/UL (ref 0–0.51)
EOSINOPHIL NFR BLD: 6.2 % (ref 0–6.9)
ERYTHROCYTE [DISTWIDTH] IN BLOOD BY AUTOMATED COUNT: 48.4 FL (ref 35.9–50)
ERYTHROCYTE [SEDIMENTATION RATE] IN BLOOD BY WESTERGREN METHOD: 26 MM/HOUR (ref 0–30)
GLUCOSE BLD-MCNC: 159 MG/DL (ref 65–99)
GLUCOSE BLD-MCNC: 369 MG/DL (ref 65–99)
GLUCOSE BLD-MCNC: 68 MG/DL (ref 65–99)
GLUCOSE BLD-MCNC: 75 MG/DL (ref 65–99)
GLUCOSE SERPL-MCNC: 91 MG/DL (ref 65–99)
HCT VFR BLD AUTO: 30.9 % (ref 37–47)
HGB BLD-MCNC: 9.8 G/DL (ref 12–16)
IMM GRANULOCYTES # BLD AUTO: 0.02 K/UL (ref 0–0.11)
IMM GRANULOCYTES NFR BLD AUTO: 0.4 % (ref 0–0.9)
LYMPHOCYTES # BLD AUTO: 1.83 K/UL (ref 1–4.8)
LYMPHOCYTES NFR BLD: 34.3 % (ref 22–41)
MCH RBC QN AUTO: 31 PG (ref 27–33)
MCHC RBC AUTO-ENTMCNC: 31.7 G/DL (ref 33.6–35)
MCV RBC AUTO: 97.8 FL (ref 81.4–97.8)
MONOCYTES # BLD AUTO: 0.47 K/UL (ref 0–0.85)
MONOCYTES NFR BLD AUTO: 8.8 % (ref 0–13.4)
NEUTROPHILS # BLD AUTO: 2.62 K/UL (ref 2–7.15)
NEUTROPHILS NFR BLD: 49 % (ref 44–72)
NRBC # BLD AUTO: 0 K/UL
NRBC BLD-RTO: 0 /100 WBC
PLATELET # BLD AUTO: 253 K/UL (ref 164–446)
PMV BLD AUTO: 9.1 FL (ref 9–12.9)
POTASSIUM SERPL-SCNC: 3.3 MMOL/L (ref 3.6–5.5)
RBC # BLD AUTO: 3.16 M/UL (ref 4.2–5.4)
SODIUM SERPL-SCNC: 143 MMOL/L (ref 135–145)
WBC # BLD AUTO: 5.3 K/UL (ref 4.8–10.8)

## 2018-03-16 PROCEDURE — 90686 IIV4 VACC NO PRSV 0.5 ML IM: CPT | Performed by: INTERNAL MEDICINE

## 2018-03-16 PROCEDURE — 700105 HCHG RX REV CODE 258: Performed by: INTERNAL MEDICINE

## 2018-03-16 PROCEDURE — 93922 UPR/L XTREMITY ART 2 LEVELS: CPT

## 2018-03-16 PROCEDURE — 85025 COMPLETE CBC W/AUTO DIFF WBC: CPT

## 2018-03-16 PROCEDURE — A9270 NON-COVERED ITEM OR SERVICE: HCPCS | Performed by: INTERNAL MEDICINE

## 2018-03-16 PROCEDURE — 90471 IMMUNIZATION ADMIN: CPT

## 2018-03-16 PROCEDURE — 96366 THER/PROPH/DIAG IV INF ADDON: CPT

## 2018-03-16 PROCEDURE — 700102 HCHG RX REV CODE 250 W/ 637 OVERRIDE(OP): Performed by: INTERNAL MEDICINE

## 2018-03-16 PROCEDURE — 80048 BASIC METABOLIC PNL TOTAL CA: CPT

## 2018-03-16 PROCEDURE — 99217 PR OBSERVATION CARE DISCHARGE: CPT | Performed by: INTERNAL MEDICINE

## 2018-03-16 PROCEDURE — 700111 HCHG RX REV CODE 636 W/ 250 OVERRIDE (IP): Performed by: INTERNAL MEDICINE

## 2018-03-16 PROCEDURE — 96372 THER/PROPH/DIAG INJ SC/IM: CPT

## 2018-03-16 PROCEDURE — 82962 GLUCOSE BLOOD TEST: CPT

## 2018-03-16 PROCEDURE — G0378 HOSPITAL OBSERVATION PER HR: HCPCS

## 2018-03-16 PROCEDURE — 85652 RBC SED RATE AUTOMATED: CPT

## 2018-03-16 RX ORDER — SULFAMETHOXAZOLE AND TRIMETHOPRIM 800; 160 MG/1; MG/1
1 TABLET ORAL 2 TIMES DAILY
Qty: 6 TAB | Refills: 0 | Status: SHIPPED | OUTPATIENT
Start: 2018-03-16 | End: 2018-03-19

## 2018-03-16 RX ORDER — HYDROCODONE BITARTRATE AND ACETAMINOPHEN 7.5; 325 MG/1; MG/1
1-2 TABLET ORAL EVERY 4 HOURS PRN
Status: DISCONTINUED | OUTPATIENT
Start: 2018-03-16 | End: 2018-03-16 | Stop reason: HOSPADM

## 2018-03-16 RX ORDER — POTASSIUM CHLORIDE 20 MEQ/1
40 TABLET, EXTENDED RELEASE ORAL 2 TIMES DAILY
Status: DISCONTINUED | OUTPATIENT
Start: 2018-03-16 | End: 2018-03-16 | Stop reason: HOSPADM

## 2018-03-16 RX ORDER — CEPHALEXIN 250 MG/1
250 CAPSULE ORAL 4 TIMES DAILY
Qty: 12 CAP | Refills: 0 | Status: SHIPPED | OUTPATIENT
Start: 2018-03-16 | End: 2018-03-19

## 2018-03-16 RX ADMIN — LEVOTHYROXINE SODIUM 175 MCG: 175 TABLET ORAL at 06:17

## 2018-03-16 RX ADMIN — VITAMIN D, TAB 1000IU (100/BT) 2000 UNITS: 25 TAB at 08:29

## 2018-03-16 RX ADMIN — HYDROCODONE BITARTRATE AND ACETAMINOPHEN 2 TABLET: 7.5; 325 TABLET ORAL at 06:46

## 2018-03-16 RX ADMIN — ASPIRIN 81 MG: 81 TABLET, CHEWABLE ORAL at 08:29

## 2018-03-16 RX ADMIN — INFLUENZA A VIRUS A/MICHIGAN/45/2015 X-275 (H1N1) ANTIGEN (FORMALDEHYDE INACTIVATED), INFLUENZA A VIRUS A/HONG KONG/4801/2014 X-263B (H3N2) ANTIGEN (FORMALDEHYDE INACTIVATED), INFLUENZA B VIRUS B/PHUKET/3073/2013 ANTIGEN (FORMALDEHYDE INACTIVATED), AND INFLUENZA B VIRUS B/BRISBANE/60/2008 ANTIGEN (FORMALDEHYDE INACTIVATED) 0.5 ML: 15; 15; 15; 15 INJECTION, SUSPENSION INTRAMUSCULAR at 07:31

## 2018-03-16 RX ADMIN — AMPICILLIN SODIUM AND SULBACTAM SODIUM 3 G: 2; 1 INJECTION, POWDER, FOR SOLUTION INTRAMUSCULAR; INTRAVENOUS at 08:31

## 2018-03-16 RX ADMIN — HEPARIN SODIUM 5000 UNITS: 5000 INJECTION, SOLUTION INTRAVENOUS; SUBCUTANEOUS at 06:17

## 2018-03-16 RX ADMIN — POTASSIUM CHLORIDE 40 MEQ: 1500 TABLET, EXTENDED RELEASE ORAL at 09:00

## 2018-03-16 RX ADMIN — HYDROCODONE BITARTRATE AND ACETAMINOPHEN 2 TABLET: 7.5; 325 TABLET ORAL at 01:19

## 2018-03-16 RX ADMIN — AMPICILLIN SODIUM AND SULBACTAM SODIUM 3 G: 2; 1 INJECTION, POWDER, FOR SOLUTION INTRAMUSCULAR; INTRAVENOUS at 04:34

## 2018-03-16 RX ADMIN — VANCOMYCIN HYDROCHLORIDE 1100 MG: 100 INJECTION, POWDER, LYOPHILIZED, FOR SOLUTION INTRAVENOUS at 10:34

## 2018-03-16 RX ADMIN — STANDARDIZED SENNA CONCENTRATE AND DOCUSATE SODIUM 2 TABLET: 8.6; 5 TABLET, FILM COATED ORAL at 08:29

## 2018-03-16 RX ADMIN — HYDROCODONE BITARTRATE AND ACETAMINOPHEN 1 TABLET: 7.5; 325 TABLET ORAL at 10:49

## 2018-03-16 ASSESSMENT — PAIN SCALES - GENERAL
PAINLEVEL_OUTOF10: 3
PAINLEVEL_OUTOF10: 8
PAINLEVEL_OUTOF10: 10

## 2018-03-16 NOTE — PROGRESS NOTES
"Pharmacy Kinetics 62 y.o. female on vancomycin day # 1 3/15/2018    Currently on Vancomycin 1900 mg iv x 1 loading dose to be given ~ 19:00    Indication for Treatment: cellulitis    Pertinent history per medical record: Admitted on 3/15/2018 for worsening pain and swelling to wound on bottom of left heel s/p stepping on a belt buckle 5 days ago.  PHM:  T1DM, HTN.  Empiric antibiotics initiated for cellulitis.    Other antibiotics: Unasyn 3 g IV q6h    Allergies: Ativan     List concerns for renal function: SCr elevated (1.06)     Pertinent cultures to date:   none    Recent Labs      03/15/18   1315   WBC  6.2   NEUTSPOLYS  56.30     Recent Labs      03/15/18   1200   BUN  19   CREATININE  1.06   ALBUMIN  3.6       Blood pressure 144/68, pulse 84, temperature 36.6 °C (97.8 °F), resp. rate 17, height 1.676 m (5' 6\"), weight 72.8 kg (160 lb 7.9 oz), last menstrual period 04/29/2005, SpO2 96 %.       A/P   1. Vancomycin dose change: new start, clinical pharmacist to evaluate renal function after am labs  2. Next vancomycin level: not ordered  3. Goal trough: 12 - 16 mcg/mL  4. Comments: Some concern for accumulation due to RHEA vs CKD.  Serum Creatnine is elevated over recent baseline however patient has past history of T1DM and poor renal function.  With current lab values protocol would suggest daily dosing due next ~ 17:00 3/16.  Clinical pharmacist to evaluate am labs and determine continued dosing / interval.    Lisy Conde [facility]    "

## 2018-03-16 NOTE — ASSESSMENT & PLAN NOTE
-She has good capillary refill and good palpable pulses but she could have some risk factor for possible peripheral arterial disease given her underlying long-standing diabetes type mellitus 1  -will check an arterial ultrasound to rule out peripheral arterial disease

## 2018-03-16 NOTE — DISCHARGE INSTRUCTIONS
Discharge Instructions    Discharged to home by car with relative. Discharged via walking, hospital escort: Yes.  Special equipment needed: Not Applicable    Be sure to schedule a follow-up appointment with your primary care doctor or any specialists as instructed.     Discharge Plan:   Diet Plan: Discussed  Activity Level: Discussed  Smoking Cessation Offered: Patient Counseled  Confirmed Follow up Appointment: Patient to Call and Schedule Appointment  Confirmed Symptoms Management: Discussed  Medication Reconciliation Updated: Yes  Influenza Vaccine Indication: Indicated: 9 to 64 years of age  Influenza Vaccine Given - only chart on this line when given: Influenza Vaccine Given (See MAR)    I understand that a diet low in cholesterol, fat, and sodium is recommended for good health. Unless I have been given specific instructions below for another diet, I accept this instruction as my diet prescription.   Other diet: diabetic    Special Instructions: None    · Is patient discharged on Warfarin / Coumadin?   No     Depression / Suicide Risk    As you are discharged from this [facility] facility, it is important to learn how to keep safe from harming yourself.    Recognize the warning signs:  · Abrupt changes in personality, positive or negative- including increase in energy   · Giving away possessions  · Change in eating patterns- significant weight changes-  positive or negative  · Change in sleeping patterns- unable to sleep or sleeping all the time   · Unwillingness or inability to communicate  · Depression  · Unusual sadness, discouragement and loneliness  · Talk of wanting to die  · Neglect of personal appearance   · Rebelliousness- reckless behavior  · Withdrawal from people/activities they love  · Confusion- inability to concentrate     If you or a loved one observes any of these behaviors or has concerns about self-harm, here's what you can do:  · Talk about it- your feelings and reasons for harming  yourself  · Remove any means that you might use to hurt yourself (examples: pills, rope, extension cords, firearm)  · Get professional help from the community (Mental Health, Substance Abuse, psychological counseling)  · Do not be alone:Call your Safe Contact- someone whom you trust who will be there for you.  · Call your local CRISIS HOTLINE 250-0256 or 549-547-5292  · Call your local Children's Mobile Crisis Response Team [place] (572) 176-0182 or www.[employer]  · Call the toll free National Suicide Prevention Hotlines   · National Suicide Prevention Lifeline 470-507-PIIE (5118)  · National Hope Line Network 800-SUICIDE (058-8415)

## 2018-03-16 NOTE — PROGRESS NOTES
"Pharmacy Kinetics 62 y.o. female on vancomycin day # 2 3/16/2018    Received Vancomycin 1,800 mg iv loading dose at 18:47 PM on 3/15/18  Indication for Treatment: cellulitis of the left heel     Pertinent history per medical record: Admitted on 3/15/2018 for SSI of the left heel. Mrs. Manuel is a 61 y/o female with T1DM who presented to the ER yesterday w/ complaints of pain, bleeding, and swelling of her left heel s/p stepping on her 's belt buckle. The patient denied fevers/chills and was afebrile upon presentation.     Other antibiotics: ampicillin/sulbactam 3g IV every 6 hours     Allergies: Ativan     List concerns for renal function: Age. Of note, patient's RHEA has resolved since yesterday:     3/15/2018 12:00 3/16/2018 04:36   Creatinine 1.06 0.77     Pertinent cultures to date:   No cultures ordered at this time    Recent Labs      03/15/18   1315  18   0436   WBC  6.2  5.3   NEUTSPOLYS  56.30  49.00     Recent Labs      03/15/18   1200  18   0436   BUN  19  17   CREATININE  1.06  0.77   ALBUMIN  3.6   --      Intake/Output Summary (Last 24 hours) at 18 0829  Last data filed at 18 0500   Gross per 24 hour   Intake              800 ml   Output                0 ml   Net              800 ml      Blood pressure 106/61, pulse 87, temperature 36.6 °C (97.8 °F), resp. rate 19, height 1.676 m (5' 6\"), weight 72.8 kg (160 lb 7.9 oz), last menstrual period 2005, SpO2 96 %, not currently breastfeeding. Temp (24hrs), Av.5 °C (97.7 °F), Min:36.1 °C (96.9 °F), Max:36.8 °C (98.3 °F)    A/P   1. Vancomycin dose change: 1,100 mg IV Q12h (~15 mg/kg)  2. Next vancomycin level: Ordered for tomorrow at 08:30 AM (prior to the 4th total vancomycin dose)  3. Goal trough: 12-16 mcg/mL  4. Comments: Will start patient on vancomycin maintenance regimen ~15 mg/kg Q12h, as RHEA has resolved. Will obtain trough level at steady state. Pharmacy recommends de-escalation of abx if concerns for MRSA " have been ruled out. Will continue to follow.     Natividad Ocampo, PharmD, BCPS

## 2018-03-16 NOTE — ASSESSMENT & PLAN NOTE
-Continue IV Unasyn and vancomycin  -Imaging done in the ER shows no evidence of abscess or bone involvement, CRP is only mildly elevated  -Controlled blood sugars  -Control pain with judicious use of pain meds  -Follow-up all blood cultures

## 2018-03-16 NOTE — PROGRESS NOTES
PT ADMITTED TO UNIT ORIENTED TO ROOM DISCUSSED PLAN OF CARE BED IN LOW POSITION  CALL LIGHT WITHIN REACH NURSING HISTORY AND ASSESSMENT DONE CONDITION STABLE

## 2018-03-16 NOTE — PROGRESS NOTES
"Pt a&o. Emotional and tearful. Left heel swelling upon admit. States however that she has right groin/hip/leg pain. States this is not new and that she has been having this on and off pain on RLE for \"months\" now but its getting worse. It is warm upon palpation. Wiggling toes, skin warm. Have numbness and tingling bilat LE, right greater than left. Able to ambulate to bathroom with toe touch weight bearing on LLE. Norco and heat pack given with some relief. Pt turned and repositioned to the side as well. Able to make needs known.   "

## 2018-03-16 NOTE — DISCHARGE PLANNING
Care Transition Team Assessment    Information Source  Orientation : Oriented x 4  Information Given By: Patient  Who is responsible for making decisions for patient? : Patient    Readmission Evaluation  Is this a readmission?: No    Elopement Risk  Legal Hold: No  Ambulatory or Self Mobile in Wheelchair: No-Not an Elopement Risk    Interdisciplinary Discharge Planning  Does Admitting Nurse Feel This Could be a Complex Discharge?: No  Primary Care Physician: Jarrell Yates  Lives with - Patient's Self Care Capacity: Spouse  Support Systems: Spouse / Significant Other  Housing / Facility: [address]  Do You Take your Prescribed Medications Regularly: Yes  Able to Return to Previous ADL's: Yes  Mobility Issues: Yes  Prior Services: None  Assistance Needed: No  Durable Medical Equipment: Not Applicable    Discharge Preparedness  What are your discharge supports?: Spouse  Prior Functional Level: Uses Cane  Difficulity with ADLs: Walking  Difficulity with IADLs: None    Functional Assesment  Prior Functional Level: Uses Cane    Finances  Financial Barriers to Discharge: No  Prescription Coverage: Yes              Advance Directive  Advance Directive?: Living Will (she thinks she has one)    Domestic Abuse  Have you ever been the victim of abuse or violence?: No         Discharge Risks or Barriers  Discharge risks or barriers?: No    Anticipated Discharge Information  Anticipated discharge disposition: Home  Discharge Address: facesheet verified

## 2018-03-16 NOTE — ASSESSMENT & PLAN NOTE
-Check A1c  -Continue outpatient Lantus and start insulin sliding scale and adjust as needed to maintain blood sugar less than 150

## 2018-03-16 NOTE — H&P
HOSPITAL MEDICINE HISTORY/ PHYSICAL    Date of Service:  3/15/2018   5:45 PM       Patient ID:   Name: Anu Manuel. YOB: 1955. Age: 62 y.o. female. MRN: [id number]    Admitting Attending:  Dixon Chicas     PCP : Jarrell Yates M.D.          Chief Complaint:       Stepped on a belt buckle    History of Present Illness:    Kaleb is a 62 y.o. female w/h/o type I diabetes mellitus who presents with with above chief complaint. Patient states that approximately 5 days ago she stepped on her on her 's belt buckle and stepped on the nipple point which then pierced her skin embedded itself. She can pull it out and noticed that there is some mild bleeding and swelling that continue to get worse and she finally came here as she was concerned that there could be an overwhelming infection. She denies any obvious systemic symptoms such as nausea vomiting fevers or chills. She also endorses some chronic right lower extremity pain especially in the proximal hip area with no obvious claudication but does endorse some chronic pain. She is concerned that she might have peripheral arterial disease. She's had diabetes since she was 34 years old and watches her feet very closely and claims that her sugars are well-controlled at home. She is not taking medications for the pain at home and claims that the pain is worsened upon standing and is better when she does not exert any influence on.    Review of Systems:    Has Review of Systems   Constitutional: Negative for chills and fever.   HENT: Negative for hearing loss.    Eyes: Negative for blurred vision.   Respiratory: Negative for hemoptysis, sputum production and shortness of breath.    Cardiovascular: Negative for chest pain and leg swelling.   Gastrointestinal: Negative for abdominal pain, nausea and vomiting.   Genitourinary: Negative for dysuria and urgency.   Musculoskeletal: Positive for joint pain. Negative for myalgias and neck pain.   Skin:  "Negative for itching.   Neurological: Negative for dizziness, tingling, focal weakness, loss of consciousness and headaches.   Endo/Heme/Allergies: Does not bruise/bleed easily.   Psychiatric/Behavioral: Negative for depression.   All other systems reviewed and are negative.    Please see HPI, all other systems were reviewed and are negative (AMA/CMS criteria)              Past Medical/ Family / Social history (PFSH):   Past Medical History:   Diagnosis Date   • depression 8/30/2016    denies depression, states has anxiety and panic attacks   • Polyneuropathy in diabetes(357.2) 6/10/2015   • Type I (juvenile type) diabetes mellitus with neurological manifestations, uncontrolled 6/10/2015   • Encounter for long-term (current) use of insulin 9/25/2013   • Diabetes mellitus type 1 1989    insulin   • Hypothyroidism, postsurgical 1970   • Anesthesia     \"throat closes up\"\"anxiety\" ?laryngospasm, kept in ICU   • Arthritis     right shoulder, hands   • Cigarette smoker quit 2013   • Infectious disease     hepatitis C, tested neg.   • Joint replacement     cervical   • Pain     \"fibromyalgia\";lower back, right leg   • Status post appendectomy        Past Surgical History:   Procedure Laterality Date   • PB INJ,FORAMEN,L/S,1 LEVEL Right 8/31/2016    Procedure: INJ-FORAMEN EPI LUM/SAC SNGL L4-5;  Surgeon: Sukhi Godfrey M.D.;  Location: [facility];  Service: Pain Management   • LUMBAR LAMINECTOMY DISKECTOMY Right 5/10/2016    Procedure: LUMBAR L4-5 HEMILAMINECTOMY DISKECTOMY ;  Surgeon: Arnold Keyes M.D.;  Location: [facility];  Service:    • CERVICAL FUSION POSTERIOR  1/16/2009    Performed by TARA CONTRERAS at [facility]   • HARDWARE REMOVAL NEURO  1/16/2009    Performed by TARA CONTRERAS at [facility]   • NECK EXPLORATION  1/16/2009    Performed by TARA CONTRERAS at [facility]   • SHOULDER ARTHROSCOPY W/ ROTATOR CUFF REPAIR  10/9/08    Performed by " SHERLY CASTANEDA at [facility]   • SHOULDER DECOMPRESSION ARTHROSCOPIC  6/17/08    Performed by SHERLY CASTANEDA at [facility]   • CLAVICLE DISTAL EXCISION  6/17/08    Performed by SHERLY CASTANEDA at [facility]   • CERVICAL DISK AND FUSION ANTERIOR  03/12/08   • HYSTERECTOMY, VAGINAL  2006   • APPENDECTOMY  2004   • THYROID LOBECTOMY  1973   • TONSILLECTOMY  1963   • ABDOMINAL HYSTERECTOMY TOTAL     • LUMPECTOMY  1976, 2005    Breast    • LUMPECTOMY         Current Outpatient Medications:  No current facility-administered medications on file prior to encounter.      Current Outpatient Prescriptions on File Prior to Encounter   Medication Sig Dispense Refill   • levothyroxine (SYNTHROID) 175 MCG Tab Take 175 mcg by mouth Every morning on an empty stomach. Pt is alternating 150mcg one day, 175mcg the next, per her MD.     • lisinopril (PRINIVIL) 10 MG TABS Take 1 Tab by mouth every day. 90 Tab 3   • vitamin D (CHOLECALCIFEROL) 1000 UNIT TABS Take 2,000 Units by mouth every day.     • insulin glargine (LANTUS) 100 UNIT/ML SOLN Inject 20 Units as instructed every evening.         Medication Allergy/Sensitivities:  Allergies   Allergen Reactions   • Ativan Unspecified      Extreme Restless leg  PLF=[id number]       Family History:  Family History   Problem Relation Age of Onset   • Hypertension Mother    • Cancer Father       Family History   Problem Relation Age of Onset   • Hypertension Mother    • Cancer Father        Social History:  Social History   Substance Use Topics   • Smoking status: Former Smoker     Packs/day: 0.50     Years: 30.00     Quit date: 9/8/2013   • Smokeless tobacco: Current User      Comment: 1 ppd    • Alcohol use No      Comment: pt uses vapor, e-cigarette     #################################################################  Physical Exam:   Vitals/ General Appearance:   Weight/BMI: Body mass index is 25.02 kg/m².  Blood pressure 103/47, pulse 83, temperature 36.2 °C  "(97.2 °F), resp. rate 16, height 1.676 m (5' 6\"), weight 70.3 kg (155 lb), last menstrual period 04/29/2005, SpO2 95 %.   Vitals:    03/15/18 1452 03/15/18 1700 03/15/18 1718 03/15/18 1720   BP: 131/75  (!) 96/50 103/47   Pulse: 89  83    Resp: 18  16    Temp:       SpO2:   95%    Weight:  70.3 kg (155 lb)     Height:        Oxygen Therapy:  Pulse Oximetry: 95 %    Constitutional:  well developed, well nourished, non-toxic, no acute distress  HENMT: Normocephalic, atraumatic, b/l ears normal, nose normal  Eyes:  EOMI, conjunctiva normal, no discharge  Neck: no tracheal deviation, supple  Cardiovascular: normal heart rate, normal rhythm, no murmurs, no rubs or gallops; no cyanosis, clubbing or edema  Lungs: Respiratory effort is normal, normal breath sounds, breath sounds clear to auscultation b/l, no rales, rhonchi or wheezing  Abdomen: soft, non-tender, no guarding or rebound  Skin: warm, dry, no erythema, no rash  Muscle skeletal-she has good peripheral pulses dorsalis pedis bilaterally and his warm peripherally with a minor puncture wound in the calcaneal region that is not open with any purulent fluid or blood, there is mild edema that is nonpitting, moderate tenderness outpatient appreciated  Neurologic: Alert and oriented, strength 5 out of 5 throughout, no focal deficits, CN II-XII normal  Psychiatric: No anxiety or depression    #################################################################  Lab Data Review:    Objective   Recent Results (from the past 24 hour(s))   COMP METABOLIC PANEL    Collection Time: 03/15/18 12:00 PM   Result Value Ref Range    Sodium 139 135 - 145 mmol/L    Potassium 4.8 3.6 - 5.5 mmol/L    Chloride 106 96 - 112 mmol/L    Co2 27 20 - 33 mmol/L    Anion Gap 6.0 0.0 - 11.9    Glucose 281 (H) 65 - 99 mg/dL    Bun 19 8 - 22 mg/dL    Creatinine 1.06 0.50 - 1.40 mg/dL    Calcium 8.7 8.5 - 10.5 mg/dL    AST(SGOT) 32 12 - 45 U/L    ALT(SGPT) 55 (H) 2 - 50 U/L    Alkaline Phosphatase 131 " (H) 30 - 99 U/L    Total Bilirubin 0.2 0.1 - 1.5 mg/dL    Albumin 3.6 3.2 - 4.9 g/dL    Total Protein 6.3 6.0 - 8.2 g/dL    Globulin 2.7 1.9 - 3.5 g/dL    A-G Ratio 1.3 g/dL   ESTIMATED GFR    Collection Time: 03/15/18 12:00 PM   Result Value Ref Range    GFR If African American >60 >60 mL/min/1.73 m 2    GFR If Non  53 (A) >60 mL/min/1.73 m 2   CRP QUANTITIVE (NON-CARDIAC)    Collection Time: 03/15/18 12:00 PM   Result Value Ref Range    Stat C-Reactive Protein 0.91 (H) 0.00 - 0.75 mg/dL   CBC WITH DIFFERENTIAL    Collection Time: 03/15/18  1:15 PM   Result Value Ref Range    WBC 6.2 4.8 - 10.8 K/uL    RBC 3.60 (L) 4.20 - 5.40 M/uL    Hemoglobin 11.2 (L) 12.0 - 16.0 g/dL    Hematocrit 34.8 (L) 37.0 - 47.0 %    MCV 96.7 81.4 - 97.8 fL    MCH 31.1 27.0 - 33.0 pg    MCHC 32.2 (L) 33.6 - 35.0 g/dL    RDW 47.8 35.9 - 50.0 fL    Platelet Count 286 164 - 446 K/uL    MPV 9.5 9.0 - 12.9 fL    Neutrophils-Polys 56.30 44.00 - 72.00 %    Lymphocytes 28.60 22.00 - 41.00 %    Monocytes 9.00 0.00 - 13.40 %    Eosinophils 4.80 0.00 - 6.90 %    Basophils 1.00 0.00 - 1.80 %    Immature Granulocytes 0.30 0.00 - 0.90 %    Nucleated RBC 0.00 /100 WBC    Neutrophils (Absolute) 3.51 2.00 - 7.15 K/uL    Lymphs (Absolute) 1.78 1.00 - 4.80 K/uL    Monos (Absolute) 0.56 0.00 - 0.85 K/uL    Eos (Absolute) 0.30 0.00 - 0.51 K/uL    Baso (Absolute) 0.06 0.00 - 0.12 K/uL    Immature Granulocytes (abs) 0.02 0.00 - 0.11 K/uL    NRBC (Absolute) 0.00 K/uL       (click the triangle to expand results)    My interpretation of lab results:     Imaging/Procedures Review:    CT-FOOT WITH PLUS RECONS LEFT   Final Result      Edema or cellulitis in the soft tissues most conspicuously along the hindfoot without evidence of abscess      LE VENOUS DUPLEX (Specify in Comments Left, Right Or Bilateral)   Final Result      ARTERIAL EVALUATION LOWER EXTREMITY ([facility] Only)    (Results Pending)       EKG:   per my independent  read:  None performed    Assessment and Plan:      * Cellulitis- (present on admission)   Assessment & Plan    -Continue IV Unasyn and vancomycin  -Imaging done in the ER shows no evidence of abscess or bone involvement, CRP is only mildly elevated  -Controlled blood sugars  -Control pain with judicious use of pain meds  -Follow-up all blood cultures        Chronic pain of right lower extremity- (present on admission)   Assessment & Plan    -She has good capillary refill and good palpable pulses but she could have some risk factor for possible peripheral arterial disease given her underlying long-standing diabetes type mellitus 1  -will check an arterial ultrasound to rule out peripheral arterial disease        Diabetic polyneuropathy (CMS-HCC)- (present on admission)   Assessment & Plan    -Continue excellent blood sugar control        Diabetes mellitus type 1 (CMS-HCC)- (present on admission)   Assessment & Plan    -Check A1c  -Continue outpatient Lantus and start insulin sliding scale and adjust as needed to maintain blood sugar less than 150        Hypothyroidism, postsurgical- (present on admission)   Assessment & Plan    -Continue outpatient Synthroid              1. Prophylaxis: sc heparin  2. Code: Full code per patient with herself present    This dictation was created using voice recognition software. The accuracy of the dictation is limited to the abilities of the software. I expect there may be some errors of grammar and possibly content.

## 2018-03-17 NOTE — DISCHARGE SUMMARY
CHIEF COMPLAINT ON ADMISSION  Chief Complaint   Patient presents with   • Wound Check     left heel    • Leg Swelling     left        CODE STATUS  Prior    HPI & HOSPITAL COURSE  This is a 62 y.o. female here with left leg pain.    Ms. Manuel had recent puncture wound to left foot, that developed in to swelling and pain. She was admitted for cellulitis and started on unasyn and vanc.  ESR normal and CRP mildly elevated. LLE US negative for DVT. CT foot negative for abscess. Arterial doppler was negative for major artery disease. Her cellulitis quickly improved and she was discharged on PO antibiotics.    Therefore, she is discharged in good and stable condition with close outpatient follow-up.    SPECIFIC OUTPATIENT FOLLOW-UP  PCP    DISCHARGE PROBLEM LIST  Principal Problem:    Cellulitis POA: Yes  Active Problems:    Hypothyroidism, postsurgical POA: Yes    Diabetes mellitus type 1 (CMS-HCC) POA: Yes    Diabetic polyneuropathy (CMS-HCC) POA: Yes      Overview: ICD-10 transition    Chronic pain of right lower extremity POA: Yes  Resolved Problems:    * No resolved hospital problems. *      FOLLOW UP  Future Appointments  Date Time Provider Department Center   3/19/2018 11:10 AM [facility] [address]     Jarrell Yates M.D.  [address]  [address]  [facility] [address]  [phone]      Please see your pcp as previously scheduled      MEDICATIONS ON DISCHARGE   Anu Manuel   Home Medication Instructions KENDALL:[id number]    Printed on:03/16/18 1936   Medication Information                      aspirin (ASA) 81 MG Chew Tab chewable tablet  Take 81 mg by mouth every day.             cephALEXin (KEFLEX) 250 MG Cap  Take 1 Cap by mouth 4 times a day for 3 days.             gabapentin (NEURONTIN) 300 MG Cap  Take 600 mg by mouth at bedtime as needed.             HYDROcodone-acetaminophen (NORCO) 7.5-325 MG per tablet  Take 1-2 Tabs by mouth every 6 hours as needed for Moderate Pain (hip pain).              insulin glargine (LANTUS) 100 UNIT/ML SOLN  Inject 20 Units as instructed every evening.             insulin lispro (HUMALOG) 100 UNIT/ML Solution  Inject 1-5 Units as instructed 3 times a day before meals. Blood sugar  151-200 = 1 unit  201-250 = 2 units  251-300 = 3 units  301-350 = 4 units  351-400 = 5 units  Carb count 1 unit/5 carbs             levothyroxine (SYNTHROID) 175 MCG Tab  Take 175 mcg by mouth Every morning on an empty stomach. Pt is alternating 150mcg one day, 175mcg the next, per her MD.             lisinopril (PRINIVIL) 10 MG TABS  Take 1 Tab by mouth every day.             sulfamethoxazole-trimethoprim (BACTRIM DS) 800-160 MG tablet  Take 1 Tab by mouth 2 times a day for 3 days.             vitamin D (CHOLECALCIFEROL) 1000 UNIT TABS  Take 2,000 Units by mouth every day.                 DIET  No orders of the defined types were placed in this encounter.      ACTIVITY  As tolerated.  Weight bearing as tolerated      CONSULTATIONS  None    PROCEDURES  None    LABORATORY  Lab Results   Component Value Date/Time    SODIUM 143 03/16/2018 04:36 AM    POTASSIUM 3.3 (L) 03/16/2018 04:36 AM    CHLORIDE 114 (H) 03/16/2018 04:36 AM    CO2 24 03/16/2018 04:36 AM    GLUCOSE 91 03/16/2018 04:36 AM    BUN 17 03/16/2018 04:36 AM    CREATININE 0.77 03/16/2018 04:36 AM    CREATININE 1.0 01/08/2009 04:31 PM        Lab Results   Component Value Date/Time    WBC 5.3 03/16/2018 04:36 AM    HEMOGLOBIN 9.8 (L) 03/16/2018 04:36 AM    HEMATOCRIT 30.9 (L) 03/16/2018 04:36 AM    PLATELETCT 253 03/16/2018 04:36 AM        Total time of the discharge process exceeds 45 minutes

## 2018-03-28 ENCOUNTER — HOSPITAL ENCOUNTER (OUTPATIENT)
Dept: LAB | Facility: MEDICAL CENTER | Age: 63
End: 2018-03-28
Attending: NURSE PRACTITIONER
Payer: MEDICARE

## 2018-03-28 LAB — TSH SERPL DL<=0.005 MIU/L-ACNC: 11.58 UIU/ML (ref 0.38–5.33)

## 2018-03-28 PROCEDURE — 36415 COLL VENOUS BLD VENIPUNCTURE: CPT

## 2018-03-28 PROCEDURE — 84443 ASSAY THYROID STIM HORMONE: CPT

## 2018-03-30 ENCOUNTER — HOSPITAL ENCOUNTER (EMERGENCY)
Facility: MEDICAL CENTER | Age: 63
End: 2018-03-30
Attending: EMERGENCY MEDICINE
Payer: MEDICARE

## 2018-03-30 VITALS
HEIGHT: 66 IN | TEMPERATURE: 97.6 F | RESPIRATION RATE: 18 BRPM | BODY MASS INDEX: 25.23 KG/M2 | HEART RATE: 95 BPM | WEIGHT: 156.97 LBS | OXYGEN SATURATION: 97 % | SYSTOLIC BLOOD PRESSURE: 107 MMHG | DIASTOLIC BLOOD PRESSURE: 66 MMHG

## 2018-03-30 DIAGNOSIS — Z51.89 ENCOUNTER FOR WOUND RE-CHECK: ICD-10-CM

## 2018-03-30 DIAGNOSIS — M77.42 METATARSALGIA OF LEFT FOOT: ICD-10-CM

## 2018-03-30 PROCEDURE — 99283 EMERGENCY DEPT VISIT LOW MDM: CPT

## 2018-03-30 NOTE — DISCHARGE INSTRUCTIONS
Foot Pain  Introduction  Many things can cause foot pain. Some common causes are:  · An injury.  · A sprain.  · Arthritis.  · Blisters.  · Bunions.  Follow these instructions at home:  Pay attention to any changes in your symptoms. Take these actions to help with your discomfort:  · If directed, put ice on the affected area:  ¨ Put ice in a plastic bag.  ¨ Place a towel between your skin and the bag.  ¨ Leave the ice on for 15-20 minutes, 3?4 times a day for 2 days.  · Take over-the-counter and prescription medicines only as told by your health care provider.  · Wear comfortable, supportive shoes that fit you well. Do not wear high heels.  · Do not stand or walk for long periods of time.  · Do not lift a lot of weight. This can put added pressure on your feet.  · Do stretches to relieve foot pain and stiffness as told by your health care provider.  · Rub your foot gently.  · Keep your feet clean and dry.  Contact a health care provider if:  · Your pain does not get better after a few days of self-care.  · Your pain gets worse.  · You cannot stand on your foot.  Get help right away if:  · Your foot is numb or tingling.  · Your foot or toes are swollen.  · Your foot or toes turn white or blue.  · You have warmth and redness along your foot.  This information is not intended to replace advice given to you by your health care provider. Make sure you discuss any questions you have with your health care provider.  Document Released: 01/13/2017 Document Revised: 05/25/2017 Document Reviewed: 01/13/2016  © 2017 [employer]vier

## 2018-03-30 NOTE — ED PROVIDER NOTES
ED Provider Note    Scribed for Erlin Mendoza M.D. by Carlos A Collado. 3/30/2018, 11:47 AM.    Primary care provider: Jarrell Yates M.D.  Means of arrival: Walk-in  History obtained from: Patient  History limited by: None    CHIEF COMPLAINT  Chief Complaint   Patient presents with   • Foot Pain     left, to ball of foot.  onset 1 week ago.  denies fever, denies recent trauma or injury.  reports recent infection from puncture wound to same foot approx 2 weeks ago       HPI  Anu Manuel is a 62 y.o. female who presents to the Emergency Department complaining of moderate pain located on the bottom of her left foot that began one week ago. She was awoken several times during the night secondary to the severity of the pain and the pain is exacerbated by palpation. The patient reports associated left second toe pain. Her original wound to her left foot was sustained after stepping on belt buckle and she was admitted here for infection management. The patient has been compliant with her antibiotics since discharge. The patient has been walking with a cane secondary to a past hip injury. She denies fever or new foot trauma.    REVIEW OF SYSTEMS  Pertinent positives include left foot pain and left toe pain.   Pertinent negatives include no fever or new foot trauma.    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    SURGICAL HISTORY   has a past surgical history that includes hysterectomy, vaginal (2006); thyroid lobectomy (1973); lumpectomy (1976, 2005);  appendectomy (2004); cervical disk and fusion anterior (03/12/08); tonsillectomy (1963); cervical fusion posterior (1/16/2009); hardware removal neuro (1/16/2009); neck exploration (1/16/2009); abdominal hysterectomy total; lumpectomy; lumbar laminectomy diskectomy (Right, 5/10/2016); shoulder decompression arthroscopic (6/17/08); clavicle distal excision (6/17/08); shoulder arthroscopy w/ rotator cuff repair (10/9/08); and inj,foramen,l/s,1 level (Right, 8/31/2016).    SOCIAL HISTORY  Social History   Substance Use Topics   • Smoking status: Former Smoker     Packs/day: 0.50     Years: 30.00     Quit date: 9/8/2013   • Smokeless tobacco: Current User      Comment: 1 ppd    • Alcohol use No      Comment: pt uses vapor, e-cigarette      History   Drug Use No       FAMILY HISTORY  Family History   Problem Relation Age of Onset   • Hypertension Mother    • Cancer Father        CURRENT MEDICATIONS  No current facility-administered medications on file prior to encounter.      Current Outpatient Prescriptions on File Prior to Encounter   Medication Sig Dispense Refill   • aspirin (ASA) 81 MG Chew Tab chewable tablet Take 81 mg by mouth every day.     • insulin lispro (HUMALOG) 100 UNIT/ML Solution Inject 1-5 Units as instructed 3 times a day before meals. Blood sugar  151-200 = 1 unit  201-250 = 2 units  251-300 = 3 units  301-350 = 4 units  351-400 = 5 units  Carb count 1 unit/5 carbs     • HYDROcodone-acetaminophen (NORCO) 7.5-325 MG per tablet Take 1-2 Tabs by mouth every 6 hours as needed for Moderate Pain (hip pain).     • gabapentin (NEURONTIN) 300 MG Cap Take 600 mg by mouth at bedtime as needed.     • levothyroxine (SYNTHROID) 175 MCG Tab Take 175 mcg by mouth Every morning on an empty stomach. Pt is alternating 150mcg one day, 175mcg the next, per her MD.     • lisinopril (PRINIVIL) 10 MG TABS Take 1 Tab by mouth every day. 90 Tab 3   • vitamin D (CHOLECALCIFEROL) 1000 UNIT TABS Take 2,000 Units by mouth every  "day.     • insulin glargine (LANTUS) 100 UNIT/ML SOLN Inject 20 Units as instructed every evening.         ALLERGIES  Allergies   Allergen Reactions   • Ativan Unspecified      Extreme Restless leg  MEB=[id number]       PHYSICAL EXAM  VITAL SIGNS: /66   Pulse 95   Temp 36.4 °C (97.6 °F) (Temporal)   Resp 18   Ht 1.676 m (5' 6\")   Wt 71.2 kg (156 lb 15.5 oz)   LMP 04/29/2005 (LMP Unknown)   SpO2 97%   BMI 25.34 kg/m²     Nursing note and vitals reviewed.  Constitutional: No distress.   HENT: Head is atraumatic. Oropharynx is moist.   Eyes: Conjunctivae are normal. Pupils are equal, round, and reactive to light.   Cardiovascular: Normal peripheral perfusion  Respiratory: No respiratory distress.   Musculoskeletal: Normal range of motion. Well-healing wound over the plantar surface of the left heel. Tenderness of the bony prominence of the second metatarsal head.  Neurological: Alert. No focal deficits noted.    Skin: No rash. No erythema, warmth, or swelling of the left foot.  Psych: Appropriate for clinical situation     COURSE & MEDICAL DECISION MAKING  Nursing notes, VS, PMSFHx reviewed in chart.    Review of past medical records shows the patient was discharged two weeks ago after being admitted for a foot infection.     11:47 AM - Patient seen and examined at bedside. Differential diagnoses include but not limited to: metatarsalgia. We discussed the cause of metatarsalgia including favoring her forefoot when walking. She will be discharged with crutches in order to place even weight on both feet. I asked the patient to continue using her crutches for several days after her pain ceases. I asked the patient to use ibuprofen for pain management. I discussed plans for discharge with a referral to her primary care and instructed to return to the ED if her symptoms worsen. Patient understands and agrees.    DISPOSITION:  Patient will be discharged home in stable condition.    FOLLOW UP:  [facility] [place], Emergency Dept  [address] [id number] [phone]    If symptoms worsen    Jarrell Yates M.D.  [address]  [address]  [facility] [address]  [phone]    Schedule an appointment as soon as possible for a visit        The patient was discharged home with an information sheet on foot pain and told to return immediately for any signs or symptoms listed.  The patient agreed to the discharge precautions and follow-up plan which is documented in EPIC.    FINAL IMPRESSION  1. Metatarsalgia of left foot    2. Encounter for wound re-check          Carlos A MCKEON (Scribe), am scribing for, and in the presence of, Erlin Mendoza M.D..    Electronically signed by: Carlos A Collado (Scribe), 3/30/2018    Erlin MCKEON M.D. personally performed the services described in this documentation, as scribed by Carlos A Collado in my presence, and it is both accurate and complete.    The note accurately reflects work and decisions made by me.  Erlin Mendoza  3/30/2018  5:29 PM

## 2018-04-30 ENCOUNTER — HOSPITAL ENCOUNTER (EMERGENCY)
Facility: MEDICAL CENTER | Age: 63
End: 2018-04-30
Payer: MEDICARE

## 2018-04-30 VITALS
HEART RATE: 87 BPM | WEIGHT: 160 LBS | OXYGEN SATURATION: 100 % | SYSTOLIC BLOOD PRESSURE: 155 MMHG | DIASTOLIC BLOOD PRESSURE: 83 MMHG | RESPIRATION RATE: 18 BRPM | BODY MASS INDEX: 25.71 KG/M2 | TEMPERATURE: 97 F | HEIGHT: 66 IN

## 2018-04-30 LAB
ABO GROUP BLD: NORMAL
ALBUMIN SERPL BCP-MCNC: 3.9 G/DL (ref 3.2–4.9)
ALBUMIN/GLOB SERPL: 1.1 G/DL
ALP SERPL-CCNC: 106 U/L (ref 30–99)
ALT SERPL-CCNC: 21 U/L (ref 2–50)
ANION GAP SERPL CALC-SCNC: 13 MMOL/L (ref 0–11.9)
APTT PPP: 23 SEC (ref 24.7–36)
AST SERPL-CCNC: 17 U/L (ref 12–45)
BASOPHILS # BLD AUTO: 1 % (ref 0–1.8)
BASOPHILS # BLD: 0.11 K/UL (ref 0–0.12)
BILIRUB SERPL-MCNC: 0.5 MG/DL (ref 0.1–1.5)
BLD GP AB SCN SERPL QL: NORMAL
BUN SERPL-MCNC: 20 MG/DL (ref 8–22)
CALCIUM SERPL-MCNC: 9.1 MG/DL (ref 8.5–10.5)
CHLORIDE SERPL-SCNC: 97 MMOL/L (ref 96–112)
CO2 SERPL-SCNC: 25 MMOL/L (ref 20–33)
CREAT SERPL-MCNC: 1.05 MG/DL (ref 0.5–1.4)
EOSINOPHIL # BLD AUTO: 0.09 K/UL (ref 0–0.51)
EOSINOPHIL NFR BLD: 0.8 % (ref 0–6.9)
ERYTHROCYTE [DISTWIDTH] IN BLOOD BY AUTOMATED COUNT: 41.5 FL (ref 35.9–50)
GLOBULIN SER CALC-MCNC: 3.6 G/DL (ref 1.9–3.5)
GLUCOSE SERPL-MCNC: 470 MG/DL (ref 65–99)
HCT VFR BLD AUTO: 44 % (ref 37–47)
HGB BLD-MCNC: 14.7 G/DL (ref 12–16)
IMM GRANULOCYTES # BLD AUTO: 0.02 K/UL (ref 0–0.11)
IMM GRANULOCYTES NFR BLD AUTO: 0.2 % (ref 0–0.9)
INR PPP: 0.85 (ref 0.87–1.13)
LIPASE SERPL-CCNC: <3 U/L (ref 11–82)
LYMPHOCYTES # BLD AUTO: 1.43 K/UL (ref 1–4.8)
LYMPHOCYTES NFR BLD: 12.4 % (ref 22–41)
MCH RBC QN AUTO: 30.6 PG (ref 27–33)
MCHC RBC AUTO-ENTMCNC: 33.4 G/DL (ref 33.6–35)
MCV RBC AUTO: 91.7 FL (ref 81.4–97.8)
MONOCYTES # BLD AUTO: 0.69 K/UL (ref 0–0.85)
MONOCYTES NFR BLD AUTO: 6 % (ref 0–13.4)
NEUTROPHILS # BLD AUTO: 9.17 K/UL (ref 2–7.15)
NEUTROPHILS NFR BLD: 79.6 % (ref 44–72)
NRBC # BLD AUTO: 0 K/UL
NRBC BLD-RTO: 0 /100 WBC
PLATELET # BLD AUTO: 332 K/UL (ref 164–446)
PMV BLD AUTO: 9.9 FL (ref 9–12.9)
POTASSIUM SERPL-SCNC: 4.3 MMOL/L (ref 3.6–5.5)
PROT SERPL-MCNC: 7.5 G/DL (ref 6–8.2)
PROTHROMBIN TIME: 11.3 SEC (ref 12–14.6)
RBC # BLD AUTO: 4.8 M/UL (ref 4.2–5.4)
RH BLD: NORMAL
SODIUM SERPL-SCNC: 135 MMOL/L (ref 135–145)
WBC # BLD AUTO: 11.5 K/UL (ref 4.8–10.8)

## 2018-04-30 PROCEDURE — 85730 THROMBOPLASTIN TIME PARTIAL: CPT

## 2018-04-30 PROCEDURE — 80053 COMPREHEN METABOLIC PANEL: CPT

## 2018-04-30 PROCEDURE — 85025 COMPLETE CBC W/AUTO DIFF WBC: CPT

## 2018-04-30 PROCEDURE — 302449 STATCHG TRIAGE ONLY (STATISTIC)

## 2018-04-30 PROCEDURE — 86901 BLOOD TYPING SEROLOGIC RH(D): CPT

## 2018-04-30 PROCEDURE — 86900 BLOOD TYPING SEROLOGIC ABO: CPT

## 2018-04-30 PROCEDURE — 86850 RBC ANTIBODY SCREEN: CPT

## 2018-04-30 PROCEDURE — 83690 ASSAY OF LIPASE: CPT

## 2018-04-30 PROCEDURE — 85610 PROTHROMBIN TIME: CPT

## 2018-04-30 NOTE — ED NOTES
Pt arrives to triage via wheelchair with c/c bloody stools since yesterday.  Pt states it started with extreme diarrhea & progressed into passing blood clots.  A&ox4.  Tearful.  Pt to lobby & advised to inform RN of any changes.

## 2018-05-01 ENCOUNTER — HOSPITAL ENCOUNTER (EMERGENCY)
Facility: MEDICAL CENTER | Age: 63
End: 2018-05-02
Attending: EMERGENCY MEDICINE
Payer: MEDICARE

## 2018-05-01 ENCOUNTER — APPOINTMENT (OUTPATIENT)
Dept: RADIOLOGY | Facility: MEDICAL CENTER | Age: 63
End: 2018-05-01
Payer: MEDICARE

## 2018-05-01 DIAGNOSIS — R73.9 HYPERGLYCEMIA: ICD-10-CM

## 2018-05-01 DIAGNOSIS — K62.5 RECTAL BLEED: ICD-10-CM

## 2018-05-01 DIAGNOSIS — E86.1 HYPOVOLEMIA: ICD-10-CM

## 2018-05-01 LAB
ABO GROUP BLD: NORMAL
ALBUMIN SERPL BCP-MCNC: 3.8 G/DL (ref 3.2–4.9)
ALBUMIN/GLOB SERPL: 1.2 G/DL
ALP SERPL-CCNC: 110 U/L (ref 30–99)
ALT SERPL-CCNC: 24 U/L (ref 2–50)
ANION GAP SERPL CALC-SCNC: 9 MMOL/L (ref 0–11.9)
APPEARANCE UR: CLEAR
APTT PPP: 22.8 SEC (ref 24.7–36)
AST SERPL-CCNC: 23 U/L (ref 12–45)
BASOPHILS # BLD AUTO: 0.9 % (ref 0–1.8)
BASOPHILS # BLD: 0.12 K/UL (ref 0–0.12)
BILIRUB SERPL-MCNC: 0.5 MG/DL (ref 0.1–1.5)
BILIRUB UR QL STRIP.AUTO: ABNORMAL
BLD GP AB SCN SERPL QL: NORMAL
BUN SERPL-MCNC: 25 MG/DL (ref 8–22)
CALCIUM SERPL-MCNC: 9 MG/DL (ref 8.4–10.2)
CHLORIDE SERPL-SCNC: 98 MMOL/L (ref 96–112)
CO2 SERPL-SCNC: 25 MMOL/L (ref 20–33)
COLOR UR: YELLOW
CREAT SERPL-MCNC: 1.56 MG/DL (ref 0.5–1.4)
EOSINOPHIL # BLD AUTO: 0.15 K/UL (ref 0–0.51)
EOSINOPHIL NFR BLD: 1.1 % (ref 0–6.9)
ERYTHROCYTE [DISTWIDTH] IN BLOOD BY AUTOMATED COUNT: 43.3 FL (ref 35.9–50)
GLOBULIN SER CALC-MCNC: 3.2 G/DL (ref 1.9–3.5)
GLUCOSE SERPL-MCNC: 387 MG/DL (ref 65–99)
GLUCOSE UR STRIP.AUTO-MCNC: 500 MG/DL
HCT VFR BLD AUTO: 41.5 % (ref 37–47)
HGB BLD-MCNC: 14.1 G/DL (ref 12–16)
IMM GRANULOCYTES # BLD AUTO: 0.05 K/UL (ref 0–0.11)
IMM GRANULOCYTES NFR BLD AUTO: 0.4 % (ref 0–0.9)
INR PPP: 0.89 (ref 0.87–1.13)
KETONES UR STRIP.AUTO-MCNC: ABNORMAL MG/DL
LEUKOCYTE ESTERASE UR QL STRIP.AUTO: NEGATIVE
LIPASE SERPL-CCNC: 10 U/L (ref 7–58)
LYMPHOCYTES # BLD AUTO: 1.59 K/UL (ref 1–4.8)
LYMPHOCYTES NFR BLD: 11.5 % (ref 22–41)
MCH RBC QN AUTO: 31.6 PG (ref 27–33)
MCHC RBC AUTO-ENTMCNC: 34 G/DL (ref 33.6–35)
MCV RBC AUTO: 93 FL (ref 81.4–97.8)
MICRO URNS: ABNORMAL
MONOCYTES # BLD AUTO: 1.06 K/UL (ref 0–0.85)
MONOCYTES NFR BLD AUTO: 7.7 % (ref 0–13.4)
NEUTROPHILS # BLD AUTO: 10.83 K/UL (ref 2–7.15)
NEUTROPHILS NFR BLD: 78.4 % (ref 44–72)
NITRITE UR QL STRIP.AUTO: NEGATIVE
NRBC # BLD AUTO: 0 K/UL
NRBC BLD-RTO: 0 /100 WBC
PH UR STRIP.AUTO: 5 [PH]
PLATELET # BLD AUTO: 287 K/UL (ref 164–446)
PMV BLD AUTO: 9.9 FL (ref 9–12.9)
POTASSIUM SERPL-SCNC: 3.6 MMOL/L (ref 3.6–5.5)
PROT SERPL-MCNC: 7 G/DL (ref 6–8.2)
PROT UR QL STRIP: NEGATIVE MG/DL
PROTHROMBIN TIME: 12 SEC (ref 12–14.6)
RBC # BLD AUTO: 4.46 M/UL (ref 4.2–5.4)
RBC UR QL AUTO: NEGATIVE
RH BLD: NORMAL
SODIUM SERPL-SCNC: 132 MMOL/L (ref 135–145)
SP GR UR STRIP.AUTO: 1.02
WBC # BLD AUTO: 13.8 K/UL (ref 4.8–10.8)

## 2018-05-01 PROCEDURE — 82272 OCCULT BLD FECES 1-3 TESTS: CPT

## 2018-05-01 PROCEDURE — 81003 URINALYSIS AUTO W/O SCOPE: CPT

## 2018-05-01 PROCEDURE — 86850 RBC ANTIBODY SCREEN: CPT

## 2018-05-01 PROCEDURE — 700105 HCHG RX REV CODE 258: Performed by: EMERGENCY MEDICINE

## 2018-05-01 PROCEDURE — 99285 EMERGENCY DEPT VISIT HI MDM: CPT

## 2018-05-01 PROCEDURE — 85025 COMPLETE CBC W/AUTO DIFF WBC: CPT

## 2018-05-01 PROCEDURE — 83690 ASSAY OF LIPASE: CPT

## 2018-05-01 PROCEDURE — 71045 X-RAY EXAM CHEST 1 VIEW: CPT

## 2018-05-01 PROCEDURE — 86901 BLOOD TYPING SEROLOGIC RH(D): CPT

## 2018-05-01 PROCEDURE — 36415 COLL VENOUS BLD VENIPUNCTURE: CPT

## 2018-05-01 PROCEDURE — 85610 PROTHROMBIN TIME: CPT

## 2018-05-01 PROCEDURE — 80053 COMPREHEN METABOLIC PANEL: CPT

## 2018-05-01 PROCEDURE — 85730 THROMBOPLASTIN TIME PARTIAL: CPT

## 2018-05-01 PROCEDURE — 96360 HYDRATION IV INFUSION INIT: CPT

## 2018-05-01 PROCEDURE — 96361 HYDRATE IV INFUSION ADD-ON: CPT

## 2018-05-01 RX ORDER — SODIUM CHLORIDE 9 MG/ML
1000 INJECTION, SOLUTION INTRAVENOUS ONCE
Status: COMPLETED | OUTPATIENT
Start: 2018-05-01 | End: 2018-05-02

## 2018-05-01 RX ORDER — SODIUM CHLORIDE 9 MG/ML
1000 INJECTION, SOLUTION INTRAVENOUS ONCE
Status: COMPLETED | OUTPATIENT
Start: 2018-05-01 | End: 2018-05-01

## 2018-05-01 RX ADMIN — SODIUM CHLORIDE 1000 ML: 900 INJECTION, SOLUTION INTRAVENOUS at 23:45

## 2018-05-01 RX ADMIN — SODIUM CHLORIDE 1000 ML: 9 INJECTION, SOLUTION INTRAVENOUS at 20:05

## 2018-05-01 ASSESSMENT — PAIN SCALES - GENERAL
PAINLEVEL_OUTOF10: 6
PAINLEVEL_OUTOF10: 2

## 2018-05-01 ASSESSMENT — LIFESTYLE VARIABLES: DO YOU DRINK ALCOHOL: NO

## 2018-05-02 VITALS
SYSTOLIC BLOOD PRESSURE: 116 MMHG | HEIGHT: 66 IN | HEART RATE: 90 BPM | OXYGEN SATURATION: 94 % | TEMPERATURE: 97.1 F | WEIGHT: 150.79 LBS | RESPIRATION RATE: 19 BRPM | DIASTOLIC BLOOD PRESSURE: 63 MMHG | BODY MASS INDEX: 24.23 KG/M2

## 2018-05-02 ASSESSMENT — PAIN SCALES - GENERAL
PAINLEVEL_OUTOF10: 0

## 2018-05-02 NOTE — ED NOTES
Orthostatic BP done by ED tech, slight changes noted while standing. Pt ambulated to bathroom with tech and back. Urine sample to lab.

## 2018-05-02 NOTE — ED NOTES
Bedside report from Mechelle MORSE. Patient in bed with  at bedside. Pt has no complaints at this time, states she will may get abdominal pain and dizzy when she gets up, but is comfortable laying down and has no needs at this time. Pt has call light and states will call if needs assistance.

## 2018-05-02 NOTE — DISCHARGE INSTRUCTIONS
Please follow-up with your primary care physician within 24-48 hours for blood sugar recheck, blood pressure recheck, and kidney function recheck. Your creatinine in the emergency department today was 1.56. Return to the emergency department immediately if you develop any new or worsening symptoms including recurrent lightheadedness, fatigue, fevers, nausea, vomiting, or any further concerns. Additionally please return if you have any repeat rectal bleeding. Please contact the gastroenterologist below for a follow-up appointment.        Bloody Stools  Bloody stools means there is blood in your poop (stool). It is a sign that there is a problem somewhere in the digestive system. It is important for your doctor to find the cause of your bleeding, so the problem can be treated.   HOME CARE  · Only take medicine as told by your doctor.  · Eat foods with fiber (prunes, bran cereals).  · Drink enough fluids to keep your pee (urine) clear or pale yellow.  · Sit in warm water (sitz bath) for 10 to 15 minutes as told by your doctor.  · Know how to take your medicines (enemas, suppositories) if advised by your doctor.  · Watch for signs that you are getting better or getting worse.  GET HELP RIGHT AWAY IF:   · You are not getting better.  · You start to get better but then get worse again.  · You have new problems.  · You have severe bleeding from the place where poop comes out (rectum) that does not stop.  · You throw up (vomit) blood.  · You feel weak or pass out (faint).  · You have a fever.  MAKE SURE YOU:   · Understand these instructions.  · Will watch your condition.  · Will get help right away if you are not doing well or get worse.  Document Released: 12/06/2010 Document Revised: 03/11/2013 Document Reviewed: 05/04/2012  [employer]® Patient Information ©2014 [employer].      Hyperglycemia  Hyperglycemia occurs when the level of sugar (glucose) in the blood is too high. Glucose is a type of sugar that provides the  body's main source of energy. Certain hormones (insulin and glucagon) control the level of glucose in the blood. Insulin lowers blood glucose, and glucagon increases blood glucose. Hyperglycemia can result from having too little insulin in the bloodstream, or from the body not responding normally to insulin.  Hyperglycemia occurs most often in people who have diabetes (diabetes mellitus), but it can happen in people who do not have diabetes. It can develop quickly, and it can be life-threatening if it causes you to become severely dehydrated (diabetic ketoacidosis or hyperglycemic hyperosmolar state). Severe hyperglycemia is a medical emergency.  What are the causes?  If you have diabetes, hyperglycemia may be caused by:  · Diabetes medicine.  · Medicines that increase blood glucose or affect your diabetes control.  · Not eating enough, or not eating often enough.  · Changes in physical activity level.  · Being sick or having an infection.  If you have prediabetes or undiagnosed diabetes:  · Hyperglycemia may be caused by those conditions.  If you do not have diabetes, hyperglycemia may be caused by:  · Certain medicines, including steroid medicines, beta-blockers, epinephrine, and thiazide diuretics.  · Stress.  · Serious illness.  · Surgery.  · Diseases of the pancreas.  · Infection.  What increases the risk?  Hyperglycemia is more likely to develop in people who have risk factors for diabetes, such as:  · Having a family member with diabetes.  · Having a gene for type 1 diabetes that is passed from parent to child (inherited).  · Living in an area with cold weather conditions.  · Exposure to certain viruses.  · Certain conditions in which the body's disease-fighting (immune) system attacks itself (autoimmune disorders).  · Being overweight or obese.  · Having an inactive (sedentary) lifestyle.  · Having been diagnosed with insulin resistance.  · Having a history of prediabetes, gestational diabetes, or polycystic  ovarian syndrome (PCOS).  · Being of American-Swedish, -American, /, or / descent.  What are the signs or symptoms?  Hyperglycemia may not cause any symptoms. If you do have symptoms, they may include early warning signs, such as:  · Increased thirst.  · Hunger.  · Feeling very tired.  · Needing to urinate more often than usual.  · Blurry vision.  Other symptoms may develop if hyperglycemia gets worse, such as:  · Dry mouth.  · Loss of appetite.  · Fruity-smelling breath.  · Weakness.  · Unexpected or rapid weight gain or weight loss.  · Tingling or numbness in the hands or feet.  · Headache.  · Skin that does not quickly return to normal after being lightly pinched and released (poor skin turgor).  · Abdominal pain.  · Cuts or bruises that are slow to heal.  How is this diagnosed?  Hyperglycemia is diagnosed with a blood test to measure your blood glucose level. This blood test is usually done while you are having symptoms. Your health care provider may also do a physical exam and review your medical history.  You may have more tests to determine the cause of your hyperglycemia, such as:  · A fasting blood glucose (FBG) test. You will not be allowed to eat (you will fast) for at least 8 hours before a blood sample is taken.  · An A1c (hemoglobin A1c) blood test. This provides information about blood glucose control over the previous 2-3 months.  · An oral glucose tolerance test (OGTT). This measures your blood glucose at two times:  ¨ After fasting. This is your baseline blood glucose level.  ¨ Two hours after drinking a beverage that contains glucose.  How is this treated?  Treatment depends on the cause of your hyperglycemia. Treatment may include:  · Taking medicine to regulate your blood glucose levels. If you take insulin or other diabetes medicines, your medicine or dosage may be adjusted.  · Lifestyle changes, such as exercising more, eating healthier foods, or losing  weight.  · Treating an illness or infection, if this caused your hyperglycemia.  · Checking your blood glucose more often.  · Stopping or reducing steroid medicines, if these caused your hyperglycemia.  If your hyperglycemia becomes severe and it results in hyperglycemic hyperosmolar state, you must be hospitalized and given IV fluids.  Follow these instructions at home:  General instructions  · Take over-the-counter and prescription medicines only as told by your health care provider.  · Do not use any products that contain nicotine or tobacco, such as cigarettes and e-cigarettes. If you need help quitting, ask your health care provider.  · Limit alcohol intake to no more than 1 drink per day for nonpregnant women and 2 drinks per day for men. One drink equals 12 oz of beer, 5 oz of wine, or 1½ oz of hard liquor.  · Learn to manage stress. If you need help with this, ask your health care provider.  · Keep all follow-up visits as told by your health care provider. This is important.  Eating and drinking  · Maintain a healthy weight.  · Exercise regularly, as directed by your health care provider.  · Stay hydrated, especially when you exercise, get sick, or spend time in hot temperatures.  · Eat healthy foods, such as:  ¨ Lean proteins.  ¨ Complex carbohydrates.  ¨ Fresh fruits and vegetables.  ¨ Low-fat dairy products.  ¨ Healthy fats.  · Drink enough fluid to keep your urine clear or pale yellow.  If you have diabetes:   · Make sure you know the symptoms of hyperglycemia.  · Follow your diabetes management plan, as told by your health care provider. Make sure you:  ¨ Take your insulin and medicines as directed.  ¨ Follow your exercise plan.  ¨ Follow your meal plan. Eat on time, and do not skip meals.  ¨ Check your blood glucose as often as directed. Make sure to check your blood glucose before and after exercise. If you exercise longer or in a different way than usual, check your blood glucose more  often.  ¨ Follow your sick day plan whenever you cannot eat or drink normally. Make this plan in advance with your health care provider.  · Share your diabetes management plan with people in your workplace, school, and household.  · Check your urine for ketones when you are ill and as told by your health care provider.  · Carry a medical alert card or wear medical alert jewelry.  Contact a health care provider if:  · Your blood glucose is at or above 240 mg/dL (13.3 mmol/L) for 2 days in a row.  · You have problems keeping your blood glucose in your target range.  · You have frequent episodes of hyperglycemia.  Get help right away if:  · You have difficulty breathing.  · You have a change in how you think, feel, or act (mental status).  · You have nausea or vomiting that does not go away.  These symptoms may represent a serious problem that is an emergency. Do not wait to see if the symptoms will go away. Get medical help right away. Call your local emergency services (911 in the [place] Do not drive yourself to the hospital.   Summary  · Hyperglycemia occurs when the level of sugar (glucose) in the blood is too high.  · Hyperglycemia is diagnosed with a blood test to measure your blood glucose level. This blood test is usually done while you are having symptoms. Your health care provider may also do a physical exam and review your medical history.  · If you have diabetes, follow your diabetes management plan as told by your health care provider.  · Contact your health care provider if you have problems keeping your blood glucose in your target range.  This information is not intended to replace advice given to you by your health care provider. Make sure you discuss any questions you have with your health care provider.  Document Released: 06/13/2002 Document Revised: 09/04/2017 Document Reviewed: 09/04/2017  [employer] Patient Education © 2017 [employer]

## 2018-05-02 NOTE — ED NOTES
Fluids finished. Pt stood up, minimal decrease in BP and BP greater than after 1st L of fluid. Pt has no c/o pain or dizziness.  Patient provided printed discharge instructions which included signs and symptoms to look out for, why to return to ER, and other follow up appointments to make. Patient stated they understand discharge instructions and had no further questions or concerns at this time. Patient discharged to home with  to drive. Patient ambulated out of ER with stable gait.

## 2018-05-02 NOTE — ED NOTES
"Chief Complaint   Patient presents with   • Rectal Bleeding     Pt had rectal bleeding described as bright red blood for a day and a half starting Sunday. Was at [facility] ED yesterday and left because wait was too long. Saw PCP today, who sent her here. Pt states the bleeding has resolved, but now is feeling lightheaded and weak. Denies    • Abdominal Pain     Bilateral lower quadrants   • Nausea     BP (!) 90/56   Pulse 100   Temp 36.2 °C (97.1 °F)   Resp 18   Ht 1.676 m (5' 6\")   Wt 68.4 kg (150 lb 12.7 oz)   LMP 04/29/2005 (LMP Unknown)   SpO2 100%   BMI 24.34 kg/m²     "

## 2018-05-02 NOTE — ED PROVIDER NOTES
ED Provider Note  Chief Complaint:   Abdominal cramping, rectal bleeding and lightheadedness    HPI:  Anu Manuel is a 63 y.o. female who presents with abdominal cramping and rectal bleed. She 1st developed abdominal pain and constipation 2 nights ago. She took MiraLAX. She did begin having bowel movements, however noticed that her bowel movements were bloody. Yesterday she had multiple bowel movements and was passing what she describes as large clots of blood. Today she has not had any bloody bowel movements. Denies any preceding dark black or tarry-like stools. Additionally, her abdominal pain has resolved today. She did have a scant amount of blood on toilet paper after wiping this morning. However she felt very fatigued, she did have some lightheadedness with standing prompting her to present to the emergency department.    She does have a history of diabetes, states her blood sugar has been running high recently. She has not had any dysuria, no hematuria. No fevers, no chest pain, no shortness of breath. She denies headaches, no back pain. No abnormal bruising, rashes or lesions. 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 She has had no associated vomiting.    She is not currently followed by a gastroenterologist.    Review of Systems:  See HPI for pertinent positives and negatives. All other systems negative.    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    Social History:  Social History     Social History Main Topics   • Smoking  "status: Former Smoker     Packs/day: 0.50     Years: 30.00     Quit date: 9/8/2013   • Smokeless tobacco: Current User      Comment: 1 ppd    • Alcohol use No      Comment: pt uses vapor, e-cigarette   • Drug use: No   • Sexual activity: Not on file       Surgical History:   has a past surgical history that includes hysterectomy, vaginal (2006); thyroid lobectomy (1973); lumpectomy (1976, 2005); appendectomy (2004); cervical disk and fusion anterior (03/12/08); tonsillectomy (1963); cervical fusion posterior (1/16/2009); hardware removal neuro (1/16/2009); neck exploration (1/16/2009); abdominal hysterectomy total; lumpectomy; lumbar laminectomy diskectomy (Right, 5/10/2016); shoulder decompression arthroscopic (6/17/08); clavicle distal excision (6/17/08); shoulder arthroscopy w/ rotator cuff repair (10/9/08); and inj,foramen,l/s,1 level (Right, 8/31/2016).    Current Medications:  Home Medications     Reviewed by Anu Carroll R.N. (Registered Nurse) on 05/01/18 at 2031  Med List Status: Not Addressed   Medication Last Dose Status   aspirin (ASA) 81 MG Chew Tab chewable tablet 4/30/2018 Active   gabapentin (NEURONTIN) 300 MG Cap 3/14/2018 Active   HYDROcodone-acetaminophen (NORCO) 7.5-325 MG per tablet 3/14/2018 Active   insulin glargine (LANTUS) 100 UNIT/ML SOLN 3/14/2018 Active   insulin lispro (HUMALOG) 100 UNIT/ML Solution 3/14/2018 Active   levothyroxine (SYNTHROID) 175 MCG Tab 3/15/2018 Active   lisinopril (PRINIVIL) 10 MG TABS 3/14/2018 Active   vitamin D (CHOLECALCIFEROL) 1000 UNIT TABS 3/14/2018 Active                Allergies:  Allergies   Allergen Reactions   • Ativan Unspecified      Extreme Restless leg  [id number]       Physical Exam:  Vital Signs: BP (!) 95/58   Pulse 89   Temp 36.2 °C (97.1 °F)   Resp 14   Ht 1.676 m (5' 6\")   Wt 68.4 kg (150 lb 12.7 oz)   LMP 04/29/2005 (LMP Unknown)   SpO2 94%   BMI 24.34 kg/m²   Constitutional: Alert, no acute distress  HENT: Moist mucus membranes, " normal posterior pharynx, no intraoral lesions  Eyes: Pupils equal and reactive, normal conjunctiva  Neck: Supple, normal range of motion, no stridor  Cardiovascular: Extremities are warm and well perfused, no murmer appreciated, normal cardiac auscultation  Pulmonary: No respiratory distress, normal work of breathing, no accessory muscule usage, breath sounds clear and equal bilaterally  Abdomen: Soft, non-distended, non-tender to palpation, no peritoneal signs, no right lower quadrant tenderness to palpation, no right upper quadrant tenderness to palpation  : Fecal occult blood test is positive  Skin: Warm, dry, no rashes or lesions  Musculoskeletal: Normal range of motion in all extremities, no swelling or deformity noted  Neurologic: Alert, oriented, normal speech, normal motor function    Labs:  Labs Reviewed   CBC WITH DIFFERENTIAL - Abnormal; Notable for the following:        Result Value    WBC 13.8 (*)     Neutrophils-Polys 78.40 (*)     Lymphocytes 11.50 (*)     Neutrophils (Absolute) 10.83 (*)     Monos (Absolute) 1.06 (*)     All other components within normal limits    Narrative:     Indicate which anticoagulants the patient is on:->NONE   COMP METABOLIC PANEL - Abnormal; Notable for the following:     Sodium 132 (*)     Glucose 387 (*)     Bun 25 (*)     Creatinine 1.56 (*)     Alkaline Phosphatase 110 (*)     All other components within normal limits    Narrative:     Indicate which anticoagulants the patient is on:->NONE   APTT - Abnormal; Notable for the following:     APTT 22.8 (*)     All other components within normal limits    Narrative:     Indicate which anticoagulants the patient is on:->NONE   ESTIMATED GFR - Abnormal; Notable for the following:     GFR If  41 (*)     GFR If Non  34 (*)     All other components within normal limits    Narrative:     Indicate which anticoagulants the patient is on:->NONE   URINALYSIS,CULTURE IF INDICATED - Abnormal; Notable  for the following:     Glucose 500 (*)     Ketones Trace (*)     Bilirubin Small (*)     All other components within normal limits   COD (ADULT)   LIPASE    Narrative:     Indicate which anticoagulants the patient is on:->NONE   PROTHROMBIN TIME    Narrative:     Indicate which anticoagulants the patient is on:->NONE       Radiology:  DX-CHEST-LIMITED (1 VIEW)   Final Result      No evidence of acute cardiopulmonary process.           No recent relevant medical records available for review.     Differential diagnosis:  Hypovolemia, urinary tract infection, diverticulitis, symptomatic anemia, hyperglycemia, lower GI bleed    MDM:  History and physical exam as documented above. Patient presents with bloody stools yesterday, now resolved as well as abdominal pain yesterday now resolved. She presents with lightheadedness that began today.    On laboratory evaluation urinalysis is negative for evidence of infection. She has no significant electrolyte abnormalities, sodium is slightly low at 132. Creatinine today is 1.56. Creatinine performed yesterday was 1.05. Glucose today is improved at 387 from blood sugar of 470 yesterday. She does have a normal anion gap. Hemoglobin today is 14.1, no significant change from 14.7 yesterday. White blood count is mildly elevated at 13.8. She has no fevers, no tachycardia, less concerning for infectious etiology.    Chest x-ray is negative for acute process.    Fluid bolus initiated for hyperglycemia as well as orthostatic hypotension indicative of dehydration. On reassessment orthostatic symptoms have resolved, blood pressure is improved.    She has no evidence of acute blood loss, fecal occult blood test is faintly positive though rectal vault is empty. She was orthostatic, she was treated with 2 L of fluid. On reassessment she is able to ambulate easily to and from the restroom, states she is feeling significantly improved. Her vital signs have normalized, and she is asymptomatic.  At this time, I do believe she is safe for discharge home with very close outpatient follow-up. She says that she is easily able to get in to see her primary care physician. I explained that she has evidence of high blood sugar as well as evidence of acute renal insufficiency that I suspect is prerenal. She will follow up with her primary care physician within 24-48 hours for creatinine recheck. Additionally she will return to the emergency department if any of her symptoms recur or worsen including lightheadedness, repeat rectal bleeding, or any further concerns. Additionally she is referred to gastroenterology for an outpatient appointment, she will call tomorrow to schedule GI follow-up.    Blood pressure today is greater than 120/80, patient is instructed to follow up with primary care provider for blood pressure recheck.    Disposition:  Discharge home in stable condition    Final Impression:  1. Hypovolemia    2. Hyperglycemia    3. Rectal bleed        Electronically signed by: Enma Yang, 5/1/2018 7:20 PM

## 2018-05-02 NOTE — ED NOTES
Pt c/o iv hurting arm while bent. Pillow provided for support, and arm wrapped with coban to assist in keeping arm straight for patient comfort.

## 2018-07-19 ENCOUNTER — HOSPITAL ENCOUNTER (OUTPATIENT)
Dept: PAIN MANAGEMENT | Facility: REHABILITATION | Age: 63
End: 2018-07-19
Attending: PHYSICAL MEDICINE & REHABILITATION
Payer: MEDICARE

## 2018-07-19 ENCOUNTER — HOSPITAL ENCOUNTER (OUTPATIENT)
Dept: RADIOLOGY | Facility: REHABILITATION | Age: 63
End: 2018-07-19
Attending: PHYSICAL MEDICINE & REHABILITATION
Payer: MEDICARE

## 2018-07-19 VITALS
OXYGEN SATURATION: 96 % | WEIGHT: 143.74 LBS | TEMPERATURE: 97.3 F | BODY MASS INDEX: 23.1 KG/M2 | RESPIRATION RATE: 16 BRPM | HEART RATE: 88 BPM | HEIGHT: 66 IN | DIASTOLIC BLOOD PRESSURE: 76 MMHG | SYSTOLIC BLOOD PRESSURE: 135 MMHG

## 2018-07-19 PROCEDURE — 99152 MOD SED SAME PHYS/QHP 5/>YRS: CPT

## 2018-07-19 PROCEDURE — 99153 MOD SED SAME PHYS/QHP EA: CPT

## 2018-07-19 PROCEDURE — C1778 LEAD, NEUROSTIMULATOR: HCPCS

## 2018-07-19 PROCEDURE — 63650 IMPLANT NEUROELECTRODES: CPT

## 2018-07-19 PROCEDURE — 700111 HCHG RX REV CODE 636 W/ 250 OVERRIDE (IP)

## 2018-07-19 RX ORDER — CEFAZOLIN SODIUM 1 G/3ML
INJECTION, POWDER, FOR SOLUTION INTRAMUSCULAR; INTRAVENOUS
Status: COMPLETED
Start: 2018-07-19 | End: 2018-07-19

## 2018-07-19 RX ORDER — LIDOCAINE HYDROCHLORIDE 10 MG/ML
INJECTION, SOLUTION EPIDURAL; INFILTRATION; INTRACAUDAL; PERINEURAL
Status: COMPLETED
Start: 2018-07-19 | End: 2018-07-19

## 2018-07-19 RX ORDER — MIDAZOLAM HYDROCHLORIDE 1 MG/ML
INJECTION INTRAMUSCULAR; INTRAVENOUS
Status: COMPLETED
Start: 2018-07-19 | End: 2018-07-19

## 2018-07-19 RX ADMIN — LIDOCAINE HYDROCHLORIDE 5 ML: 10 INJECTION, SOLUTION EPIDURAL; INFILTRATION; INTRACAUDAL; PERINEURAL at 13:52

## 2018-07-19 RX ADMIN — FENTANYL CITRATE 25 MCG: 50 INJECTION, SOLUTION INTRAMUSCULAR; INTRAVENOUS at 13:53

## 2018-07-19 RX ADMIN — MIDAZOLAM HYDROCHLORIDE 1 MG: 1 INJECTION, SOLUTION INTRAMUSCULAR; INTRAVENOUS at 13:49

## 2018-07-19 RX ADMIN — MIDAZOLAM HYDROCHLORIDE 0.5 MG: 1 INJECTION, SOLUTION INTRAMUSCULAR; INTRAVENOUS at 13:53

## 2018-07-19 RX ADMIN — FENTANYL CITRATE 50 MCG: 50 INJECTION, SOLUTION INTRAMUSCULAR; INTRAVENOUS at 13:49

## 2018-07-19 RX ADMIN — CEFAZOLIN 1 G: 1 INJECTION, POWDER, FOR SOLUTION INTRAMUSCULAR; INTRAVENOUS at 13:15

## 2018-07-19 ASSESSMENT — PAIN SCALES - GENERAL
PAINLEVEL_OUTOF10: 3
PAINLEVEL_OUTOF10: 1
PAINLEVEL_OUTOF10: 1

## 2018-07-19 NOTE — PROGRESS NOTES
Medication reconciliation reviewed with patient. Denied taking any blood thinners and any  any anti- inflammatories medications. She's been off  Aspirin 81 mg for 2 weeks..Patient had a  Nicanor/ spouse .Home care form and verbal  instruction given to patient and verbalized understanding. Dr. Escamilla made aware. Lux ( Med. Rep) spoke to the patient education & instructions given & understood by patient.. Hand off reported to JOAN Woods RN.

## 2018-07-19 NOTE — PROCEDURES
DATE OF PROCEDURE: 7-19-18  PREOPERATIVE DIAGNOSES: Chronic Intractable low back and bilateral lower extremity neuropathic pain.  POSTOPERATIVE DIAGNOSES: Chronic Intractable low back and bilateral lower extremity neuropathic pain.  PROCEDURES PERFORMED:  1. Bilateral spinal cord stimulator lead placement with bilateral spinal cord  lead programming.  2. Fluoroscopy.  3. Conscious sedation.  4. Lead programming complex.  DESCRIPTION OF PROCEDURE: The patient was escorted to the procedure room,   placed in prone position. A fluoroscopic unit was then moved into position   over the target spot. The area was and then cleansed in normal sterile   fashion. The patient was then given conscious sedation of Versed as well as IV fentanyl and   monitored throughout the procedure as well as postoperatively given   postoperative instructions as well. The areas were then anesthetized with 1%   lidocaine, 4 mL each spot. The Tuohy needle was then introduced and guided   down to the lumbar lamina. Using loss of resistance technique, it was entered   into the epidural space. The lead was then placed, good placement was noted on a lateral and ap. The lead was then advanced to T7. The same was done on the left side as well. For the left lead.   Testing was then conducted did she had good coverage of her lower extremities and back and some low back and abdominal coverage as well this did cover all of her painful areas .  The needles were then withdrawn under live fluoroscopy as well as the stylette was then cleansed with normal saline and dried Steri-Strips were then placed to secure the leads of these were placed off to the right hand are site left-hand side and Tegaderm was placed secured well and comp and complex programming was then completed postoperative room she was monitored,   given postoperative instructions as well as complex programming was done for   both leads, good coverage was noted. We will keep contact with her on a  daily basis and follow her progress if she experiences any side effects such as increased numbness tingling especially any unusual feeling or increased pain,  visions changes,neck stiffness fevers chills night sweats drainage she will contact us immediately and go to the emergency room and will see her back in approximately one week.    ____________________________________  M ALISTAIR GUAJARDO MD

## 2018-07-19 NOTE — PROGRESS NOTES
Patient positioned pre-procedure by RN,CST and xray tech. Pillow placed under lower legs and feet for support.

## 2018-07-19 NOTE — PROGRESS NOTES
Received ambulatory accompanied by RN.  Patient awake, alert and verbally responsive. Tolerated fluids well.  Ice pack applied to affected area. Patient able to  move all extremities without difficulty voluntarily and on command. Reviewed home care instructions and understood by patient. ALISTAIR Rosario ( Med. Rep, ) spoke to the patient and spouse education & instruction given and understood by them.

## 2018-09-18 ENCOUNTER — HOSPITAL ENCOUNTER (OUTPATIENT)
Dept: RADIOLOGY | Facility: MEDICAL CENTER | Age: 63
End: 2018-09-18
Attending: NEUROLOGICAL SURGERY
Payer: MEDICARE

## 2018-09-18 DIAGNOSIS — R07.81 PLEURODYNIA: ICD-10-CM

## 2018-09-18 PROCEDURE — 71046 X-RAY EXAM CHEST 2 VIEWS: CPT

## 2018-10-08 DIAGNOSIS — Z01.818 PREOP EXAMINATION: ICD-10-CM

## 2018-10-08 DIAGNOSIS — Z01.810 PRE-OPERATIVE CARDIOVASCULAR EXAMINATION: ICD-10-CM

## 2018-10-08 LAB
ABO GROUP BLD: NORMAL
ANION GAP SERPL CALC-SCNC: 7 MMOL/L (ref 0–11.9)
APTT PPP: 26.6 SEC (ref 24.7–36)
BASOPHILS # BLD AUTO: 1.4 % (ref 0–1.8)
BASOPHILS # BLD: 0.09 K/UL (ref 0–0.12)
BLD GP AB SCN SERPL QL: NORMAL
BUN SERPL-MCNC: 19 MG/DL (ref 8–22)
CALCIUM SERPL-MCNC: 9.9 MG/DL (ref 8.5–10.5)
CHLORIDE SERPL-SCNC: 101 MMOL/L (ref 96–112)
CO2 SERPL-SCNC: 29 MMOL/L (ref 20–33)
CREAT SERPL-MCNC: 1.03 MG/DL (ref 0.5–1.4)
EKG IMPRESSION: NORMAL
EOSINOPHIL # BLD AUTO: 0.12 K/UL (ref 0–0.51)
EOSINOPHIL NFR BLD: 1.8 % (ref 0–6.9)
ERYTHROCYTE [DISTWIDTH] IN BLOOD BY AUTOMATED COUNT: 45.3 FL (ref 35.9–50)
EST. AVERAGE GLUCOSE BLD GHB EST-MCNC: 295 MG/DL
GLUCOSE SERPL-MCNC: 254 MG/DL (ref 65–99)
HBA1C MFR BLD: 11.9 % (ref 0–5.6)
HCT VFR BLD AUTO: 42.4 % (ref 37–47)
HGB BLD-MCNC: 13.7 G/DL (ref 12–16)
IMM GRANULOCYTES # BLD AUTO: 0.02 K/UL (ref 0–0.11)
IMM GRANULOCYTES NFR BLD AUTO: 0.3 % (ref 0–0.9)
INR PPP: 0.9 (ref 0.87–1.13)
LYMPHOCYTES # BLD AUTO: 2.24 K/UL (ref 1–4.8)
LYMPHOCYTES NFR BLD: 33.9 % (ref 22–41)
MCH RBC QN AUTO: 31.1 PG (ref 27–33)
MCHC RBC AUTO-ENTMCNC: 32.3 G/DL (ref 33.6–35)
MCV RBC AUTO: 96.1 FL (ref 81.4–97.8)
MONOCYTES # BLD AUTO: 0.44 K/UL (ref 0–0.85)
MONOCYTES NFR BLD AUTO: 6.7 % (ref 0–13.4)
NEUTROPHILS # BLD AUTO: 3.69 K/UL (ref 2–7.15)
NEUTROPHILS NFR BLD: 55.9 % (ref 44–72)
NRBC # BLD AUTO: 0 K/UL
NRBC BLD-RTO: 0 /100 WBC
PLATELET # BLD AUTO: 317 K/UL (ref 164–446)
PMV BLD AUTO: 10 FL (ref 9–12.9)
POTASSIUM SERPL-SCNC: 4.9 MMOL/L (ref 3.6–5.5)
PROTHROMBIN TIME: 12.2 SEC (ref 12–14.6)
RBC # BLD AUTO: 4.41 M/UL (ref 4.2–5.4)
RH BLD: NORMAL
SODIUM SERPL-SCNC: 137 MMOL/L (ref 135–145)
WBC # BLD AUTO: 6.6 K/UL (ref 4.8–10.8)

## 2018-10-08 PROCEDURE — 85025 COMPLETE CBC W/AUTO DIFF WBC: CPT

## 2018-10-08 PROCEDURE — 36415 COLL VENOUS BLD VENIPUNCTURE: CPT

## 2018-10-08 PROCEDURE — 86900 BLOOD TYPING SEROLOGIC ABO: CPT

## 2018-10-08 PROCEDURE — 93005 ELECTROCARDIOGRAM TRACING: CPT

## 2018-10-08 PROCEDURE — 85610 PROTHROMBIN TIME: CPT

## 2018-10-08 PROCEDURE — 80048 BASIC METABOLIC PNL TOTAL CA: CPT

## 2018-10-08 PROCEDURE — 83036 HEMOGLOBIN GLYCOSYLATED A1C: CPT

## 2018-10-08 PROCEDURE — 86901 BLOOD TYPING SEROLOGIC RH(D): CPT

## 2018-10-08 PROCEDURE — 86850 RBC ANTIBODY SCREEN: CPT

## 2018-10-08 PROCEDURE — 93010 ELECTROCARDIOGRAM REPORT: CPT | Performed by: INTERNAL MEDICINE

## 2018-10-08 PROCEDURE — 85730 THROMBOPLASTIN TIME PARTIAL: CPT

## 2018-10-08 RX ORDER — HYDROCODONE BITARTRATE AND ACETAMINOPHEN 10; 325 MG/1; MG/1
1 TABLET ORAL EVERY 8 HOURS PRN
Status: ON HOLD | COMMUNITY
End: 2018-10-26

## 2018-10-26 ENCOUNTER — APPOINTMENT (OUTPATIENT)
Dept: RADIOLOGY | Facility: MEDICAL CENTER | Age: 63
End: 2018-10-26
Attending: NEUROLOGICAL SURGERY
Payer: MEDICARE

## 2018-10-26 ENCOUNTER — HOSPITAL ENCOUNTER (OUTPATIENT)
Facility: MEDICAL CENTER | Age: 63
End: 2018-10-26
Attending: NEUROLOGICAL SURGERY | Admitting: NEUROLOGICAL SURGERY
Payer: MEDICARE

## 2018-10-26 VITALS
HEIGHT: 66 IN | RESPIRATION RATE: 16 BRPM | SYSTOLIC BLOOD PRESSURE: 135 MMHG | TEMPERATURE: 97 F | BODY MASS INDEX: 24.41 KG/M2 | OXYGEN SATURATION: 100 % | HEART RATE: 70 BPM | DIASTOLIC BLOOD PRESSURE: 68 MMHG | WEIGHT: 151.9 LBS

## 2018-10-26 DIAGNOSIS — M79.604 CHRONIC PAIN OF RIGHT LOWER EXTREMITY: ICD-10-CM

## 2018-10-26 DIAGNOSIS — G89.29 CHRONIC PAIN OF RIGHT LOWER EXTREMITY: ICD-10-CM

## 2018-10-26 LAB
GLUCOSE BLD-MCNC: 101 MG/DL (ref 65–99)
GLUCOSE BLD-MCNC: 111 MG/DL (ref 65–99)

## 2018-10-26 PROCEDURE — 700101 HCHG RX REV CODE 250

## 2018-10-26 PROCEDURE — 500866 HCHG NEEDLE, SPINAL 25G: Performed by: NEUROLOGICAL SURGERY

## 2018-10-26 PROCEDURE — 700111 HCHG RX REV CODE 636 W/ 250 OVERRIDE (IP)

## 2018-10-26 PROCEDURE — 700111 HCHG RX REV CODE 636 W/ 250 OVERRIDE (IP): Performed by: NEUROLOGICAL SURGERY

## 2018-10-26 PROCEDURE — 110454 HCHG SHELL REV 250: Performed by: NEUROLOGICAL SURGERY

## 2018-10-26 PROCEDURE — 160002 HCHG RECOVERY MINUTES (STAT): Performed by: NEUROLOGICAL SURGERY

## 2018-10-26 PROCEDURE — 700102 HCHG RX REV CODE 250 W/ 637 OVERRIDE(OP)

## 2018-10-26 PROCEDURE — 72020 X-RAY EXAM OF SPINE 1 VIEW: CPT

## 2018-10-26 PROCEDURE — C1767 GENERATOR, NEURO NON-RECHARG: HCPCS | Performed by: NEUROLOGICAL SURGERY

## 2018-10-26 PROCEDURE — 500885 HCHG PACK, JACKSON TABLE: Performed by: NEUROLOGICAL SURGERY

## 2018-10-26 PROCEDURE — 160009 HCHG ANES TIME/MIN: Performed by: NEUROLOGICAL SURGERY

## 2018-10-26 PROCEDURE — 160041 HCHG SURGERY MINUTES - EA ADDL 1 MIN LEVEL 4: Performed by: NEUROLOGICAL SURGERY

## 2018-10-26 PROCEDURE — A9270 NON-COVERED ITEM OR SERVICE: HCPCS | Performed by: ANESTHESIOLOGY

## 2018-10-26 PROCEDURE — 700111 HCHG RX REV CODE 636 W/ 250 OVERRIDE (IP): Performed by: ANESTHESIOLOGY

## 2018-10-26 PROCEDURE — 501838 HCHG SUTURE GENERAL: Performed by: NEUROLOGICAL SURGERY

## 2018-10-26 PROCEDURE — 82962 GLUCOSE BLOOD TEST: CPT

## 2018-10-26 PROCEDURE — 302131 K PAD MOTOR: Performed by: NEUROLOGICAL SURGERY

## 2018-10-26 PROCEDURE — 160036 HCHG PACU - EA ADDL 30 MINS PHASE I: Performed by: NEUROLOGICAL SURGERY

## 2018-10-26 PROCEDURE — G0378 HOSPITAL OBSERVATION PER HR: HCPCS

## 2018-10-26 PROCEDURE — C1778 LEAD, NEUROSTIMULATOR: HCPCS | Performed by: NEUROLOGICAL SURGERY

## 2018-10-26 PROCEDURE — 502000 HCHG MISC OR IMPLANTS RC 0278: Performed by: NEUROLOGICAL SURGERY

## 2018-10-26 PROCEDURE — 160029 HCHG SURGERY MINUTES - 1ST 30 MINS LEVEL 4: Performed by: NEUROLOGICAL SURGERY

## 2018-10-26 PROCEDURE — 160035 HCHG PACU - 1ST 60 MINS PHASE I: Performed by: NEUROLOGICAL SURGERY

## 2018-10-26 PROCEDURE — 500864 HCHG NEEDLE, SPINAL 18G: Performed by: NEUROLOGICAL SURGERY

## 2018-10-26 PROCEDURE — 700102 HCHG RX REV CODE 250 W/ 637 OVERRIDE(OP): Performed by: ANESTHESIOLOGY

## 2018-10-26 PROCEDURE — 160048 HCHG OR STATISTICAL LEVEL 1-5: Performed by: NEUROLOGICAL SURGERY

## 2018-10-26 PROCEDURE — A9270 NON-COVERED ITEM OR SERVICE: HCPCS

## 2018-10-26 DEVICE — IMPLANTABLE DEVICE: Type: IMPLANTABLE DEVICE | Site: BACK | Status: FUNCTIONAL

## 2018-10-26 RX ORDER — HYDRALAZINE HYDROCHLORIDE 20 MG/ML
5 INJECTION INTRAMUSCULAR; INTRAVENOUS
Status: DISCONTINUED | OUTPATIENT
Start: 2018-10-26 | End: 2018-10-26 | Stop reason: HOSPADM

## 2018-10-26 RX ORDER — BUPIVACAINE HYDROCHLORIDE AND EPINEPHRINE 5; 5 MG/ML; UG/ML
INJECTION, SOLUTION PERINEURAL
Status: DISCONTINUED | OUTPATIENT
Start: 2018-10-26 | End: 2018-10-26 | Stop reason: HOSPADM

## 2018-10-26 RX ORDER — HYDROMORPHONE HYDROCHLORIDE 2 MG/ML
INJECTION, SOLUTION INTRAMUSCULAR; INTRAVENOUS; SUBCUTANEOUS
Status: COMPLETED
Start: 2018-10-26 | End: 2018-10-26

## 2018-10-26 RX ORDER — OXYCODONE HYDROCHLORIDE 5 MG/1
10 TABLET ORAL
Status: DISCONTINUED | OUTPATIENT
Start: 2018-10-26 | End: 2018-10-26 | Stop reason: HOSPADM

## 2018-10-26 RX ORDER — CEPHALEXIN 500 MG/1
1000 CAPSULE ORAL 3 TIMES DAILY
Qty: 9 CAP | Refills: 0 | Status: SHIPPED | OUTPATIENT
Start: 2018-10-26 | End: 2018-10-29

## 2018-10-26 RX ORDER — BACITRACIN 50000 [IU]/1
INJECTION, POWDER, FOR SOLUTION INTRAMUSCULAR
Status: DISCONTINUED | OUTPATIENT
Start: 2018-10-26 | End: 2018-10-26 | Stop reason: HOSPADM

## 2018-10-26 RX ORDER — HYDROMORPHONE HYDROCHLORIDE 2 MG/ML
0.4 INJECTION, SOLUTION INTRAMUSCULAR; INTRAVENOUS; SUBCUTANEOUS
Status: DISCONTINUED | OUTPATIENT
Start: 2018-10-26 | End: 2018-10-26 | Stop reason: HOSPADM

## 2018-10-26 RX ORDER — OXYCODONE HCL 5 MG/5 ML
5 SOLUTION, ORAL ORAL
Status: COMPLETED | OUTPATIENT
Start: 2018-10-26 | End: 2018-10-26

## 2018-10-26 RX ORDER — HYDROCODONE BITARTRATE AND ACETAMINOPHEN 10; 325 MG/1; MG/1
1 TABLET ORAL EVERY 8 HOURS PRN
Qty: 15 TAB | Refills: 0 | Status: SHIPPED | OUTPATIENT
Start: 2018-10-26 | End: 2018-10-31

## 2018-10-26 RX ORDER — HYDROMORPHONE HYDROCHLORIDE 2 MG/ML
0.1 INJECTION, SOLUTION INTRAMUSCULAR; INTRAVENOUS; SUBCUTANEOUS
Status: DISCONTINUED | OUTPATIENT
Start: 2018-10-26 | End: 2018-10-26 | Stop reason: HOSPADM

## 2018-10-26 RX ORDER — MEPERIDINE HYDROCHLORIDE 25 MG/ML
12.5 INJECTION INTRAMUSCULAR; INTRAVENOUS; SUBCUTANEOUS
Status: DISCONTINUED | OUTPATIENT
Start: 2018-10-26 | End: 2018-10-26 | Stop reason: HOSPADM

## 2018-10-26 RX ORDER — OXYCODONE HYDROCHLORIDE 5 MG/1
5 TABLET ORAL
Status: DISCONTINUED | OUTPATIENT
Start: 2018-10-26 | End: 2018-10-26 | Stop reason: HOSPADM

## 2018-10-26 RX ORDER — OXYCODONE HCL 5 MG/5 ML
10 SOLUTION, ORAL ORAL
Status: COMPLETED | OUTPATIENT
Start: 2018-10-26 | End: 2018-10-26

## 2018-10-26 RX ORDER — SODIUM CHLORIDE 9 MG/ML
INJECTION, SOLUTION INTRAVENOUS ONCE
Status: COMPLETED | OUTPATIENT
Start: 2018-10-26 | End: 2018-10-26

## 2018-10-26 RX ORDER — SODIUM CHLORIDE 9 MG/ML
INJECTION, SOLUTION INTRAVENOUS CONTINUOUS
Status: DISCONTINUED | OUTPATIENT
Start: 2018-10-26 | End: 2018-10-26 | Stop reason: HOSPADM

## 2018-10-26 RX ORDER — HALOPERIDOL 5 MG/ML
1 INJECTION INTRAMUSCULAR
Status: DISCONTINUED | OUTPATIENT
Start: 2018-10-26 | End: 2018-10-26 | Stop reason: HOSPADM

## 2018-10-26 RX ORDER — HYDROMORPHONE HYDROCHLORIDE 2 MG/ML
0.2 INJECTION, SOLUTION INTRAMUSCULAR; INTRAVENOUS; SUBCUTANEOUS
Status: DISCONTINUED | OUTPATIENT
Start: 2018-10-26 | End: 2018-10-26 | Stop reason: HOSPADM

## 2018-10-26 RX ORDER — LABETALOL HYDROCHLORIDE 5 MG/ML
5 INJECTION, SOLUTION INTRAVENOUS
Status: DISCONTINUED | OUTPATIENT
Start: 2018-10-26 | End: 2018-10-26 | Stop reason: HOSPADM

## 2018-10-26 RX ORDER — DIPHENHYDRAMINE HYDROCHLORIDE 50 MG/ML
12.5 INJECTION INTRAMUSCULAR; INTRAVENOUS
Status: DISCONTINUED | OUTPATIENT
Start: 2018-10-26 | End: 2018-10-26 | Stop reason: HOSPADM

## 2018-10-26 RX ORDER — ONDANSETRON 2 MG/ML
4 INJECTION INTRAMUSCULAR; INTRAVENOUS
Status: COMPLETED | OUTPATIENT
Start: 2018-10-26 | End: 2018-10-26

## 2018-10-26 RX ADMIN — HALOPERIDOL LACTATE 1 MG: 5 INJECTION, SOLUTION INTRAMUSCULAR at 16:56

## 2018-10-26 RX ADMIN — LIDOCAINE HYDROCHLORIDE 0.5 ML: 10 INJECTION, SOLUTION EPIDURAL; INFILTRATION; INTRACAUDAL; PERINEURAL at 06:49

## 2018-10-26 RX ADMIN — HYDROMORPHONE HYDROCHLORIDE 0.5 MG: 2 INJECTION, SOLUTION INTRAMUSCULAR; INTRAVENOUS; SUBCUTANEOUS at 12:30

## 2018-10-26 RX ADMIN — FENTANYL CITRATE 50 MCG: 50 INJECTION, SOLUTION INTRAMUSCULAR; INTRAVENOUS at 15:21

## 2018-10-26 RX ADMIN — HYDROMORPHONE HYDROCHLORIDE 0.5 MG: 2 INJECTION INTRAMUSCULAR; INTRAVENOUS; SUBCUTANEOUS at 11:35

## 2018-10-26 RX ADMIN — HYDROMORPHONE HYDROCHLORIDE 0.5 MG: 2 INJECTION, SOLUTION INTRAMUSCULAR; INTRAVENOUS; SUBCUTANEOUS at 11:35

## 2018-10-26 RX ADMIN — FENTANYL CITRATE 50 MCG: 50 INJECTION, SOLUTION INTRAMUSCULAR; INTRAVENOUS at 11:20

## 2018-10-26 RX ADMIN — SODIUM CHLORIDE: 9 INJECTION, SOLUTION INTRAVENOUS at 06:49

## 2018-10-26 RX ADMIN — FENTANYL CITRATE 50 MCG: 50 INJECTION, SOLUTION INTRAMUSCULAR; INTRAVENOUS at 17:34

## 2018-10-26 RX ADMIN — FENTANYL CITRATE 50 MCG: 50 INJECTION, SOLUTION INTRAMUSCULAR; INTRAVENOUS at 11:15

## 2018-10-26 RX ADMIN — FENTANYL CITRATE 50 MCG: 50 INJECTION, SOLUTION INTRAMUSCULAR; INTRAVENOUS at 13:50

## 2018-10-26 RX ADMIN — ONDANSETRON 4 MG: 2 INJECTION INTRAMUSCULAR; INTRAVENOUS at 13:22

## 2018-10-26 RX ADMIN — OXYCODONE HYDROCHLORIDE 10 MG: 5 SOLUTION ORAL at 12:25

## 2018-10-26 RX ADMIN — FENTANYL CITRATE 50 MCG: 50 INJECTION, SOLUTION INTRAMUSCULAR; INTRAVENOUS at 14:44

## 2018-10-26 ASSESSMENT — PAIN SCALES - GENERAL
PAINLEVEL_OUTOF10: ASSUMED PAIN PRESENT
PAINLEVEL_OUTOF10: 6
PAINLEVEL_OUTOF10: ASSUMED PAIN PRESENT
PAINLEVEL_OUTOF10: ASSUMED PAIN PRESENT
PAINLEVEL_OUTOF10: 4
PAINLEVEL_OUTOF10: 8
PAINLEVEL_OUTOF10: ASSUMED PAIN PRESENT
PAINLEVEL_OUTOF10: 4
PAINLEVEL_OUTOF10: 0
PAINLEVEL_OUTOF10: ASSUMED PAIN PRESENT
PAINLEVEL_OUTOF10: 4
PAINLEVEL_OUTOF10: 10
PAINLEVEL_OUTOF10: ASSUMED PAIN PRESENT
PAINLEVEL_OUTOF10: 10
PAINLEVEL_OUTOF10: 8
PAINLEVEL_OUTOF10: 4
PAINLEVEL_OUTOF10: ASSUMED PAIN PRESENT
PAINLEVEL_OUTOF10: 4
PAINLEVEL_OUTOF10: 8
PAINLEVEL_OUTOF10: 4

## 2018-10-26 ASSESSMENT — PATIENT HEALTH QUESTIONNAIRE - PHQ9
SUM OF ALL RESPONSES TO PHQ9 QUESTIONS 1 AND 2: 0
2. FEELING DOWN, DEPRESSED, IRRITABLE, OR HOPELESS: NOT AT ALL
1. LITTLE INTEREST OR PLEASURE IN DOING THINGS: NOT AT ALL

## 2018-10-26 ASSESSMENT — LIFESTYLE VARIABLES
DO YOU DRINK ALCOHOL: NO
EVER_SMOKED: YES
ALCOHOL_USE: NO

## 2018-10-26 ASSESSMENT — COPD QUESTIONNAIRES
COPD SCREENING SCORE: 4
DO YOU EVER COUGH UP ANY MUCUS OR PHLEGM?: NO/ONLY WITH OCCASIONAL COLDS OR INFECTIONS
HAVE YOU SMOKED AT LEAST 100 CIGARETTES IN YOUR ENTIRE LIFE: YES
DURING THE PAST 4 WEEKS HOW MUCH DID YOU FEEL SHORT OF BREATH: NONE/LITTLE OF THE TIME
IN THE PAST 12 MONTHS DO YOU DO LESS THAN YOU USED TO BECAUSE OF YOUR BREATHING PROBLEMS: DISAGREE/UNSURE

## 2018-10-26 NOTE — OR NURSING
POSTOP THORACIC LAMINECTOMY WITH SPINAL CORD STIMULATOR PLACEMENT    PT WAS ROLLING AROUND ON KIMBERLYRLUIS ENRIQUE ON ARRIVAL INTO PACU, PT SAT STRAIGHT UP INDEPENDENTLY BUT CONFUSED, PT SETTLED LYING ON RIGHT SIDE, DR LAZCANO ANESTHESIA GAVE FENTANYL INTO REMAINING IV FLUIDS FOR PAIN MANAGEMENT, HE DOCUMENTED ON HIS ANESTHESIA RECORD.     ONCE PT AWAKE, REPORTED SEVERE BACK PAIN, MEDICATED WITH BOTH IV AND PO PRN PAIN MEDICATION. WHEN PT AWAKE SHE REPORTS SEVERE PAIN, IV PAIN MEDICATION GIVEN THEN SHE HAS TO GO BACK OF 2L NC. PT HAS CRIED AND STATED THAT THE TEMPORARY SPINAL STIM PROCEDURE HURT NOTHING LIKE TODAY'S PROCEDURE. PT CANNOT TOLERATED HAVING HOB INCREASED VERY MUCH BEFORE SHE STARTS CRYING ABOUT HOW HER BACK, RIBS, AND RAISING ARMS TOO MUCH SEVERELY HURTS.     PT TOLERATING PO FLUIDS WITHOUT NAUSEA. SNACK GIVEN, PT FELL ASLEEP AND SPILT DRINK ALL OVER SELF, CHANGED, WARM BLANKETS APPLIED, BOOSTED AND REPOSITIONED ON LEFT SIDE WITH 2 RN SLOW EASY MOVEMENTS, PT EXTREMELY ANXIOUS, ANTICIPATORY OF PAIN, CRYING, STATING THE POSITIONING HURTS TOO MUCH, THEN OFFERED TO MOVE HER BACK BUT SHE WANTED TO SETTLE INTO SLIGHT LEFT SIDE WITH 2 PILLOW SUPPORTS, PILLOW BETWEEN LEGS, PRN IV MEDICATION GIVEN, PT FEEL BACK TO SLEEP WITH NO PAIN BEHAVIORS. PT  CONTINUES TO NEED 2L NC DUE TO IV PAIN MEDICATION CAUSES HER TO FALL BACK TO SLEEP.

## 2018-10-26 NOTE — OP REPORT
NEUROSURGERY OPERATIVE NOTE  DATE:  11:00 AM 10/26/2018    PATIENT NAME:  Anu Manuel   1955 MRN [id number]      PROCEDURE:  1.  Superior T10 laminotomy  2.  Permanent spinal cord stimulator paddle placement    Surgeon:  Ryan Roman MD, PhD  Assistant: Leida Collins, certified first assistant    Anesthesia:  GETA    Diagnosis: Intractable leg pain    Indication: 63-year-old female with intractable leg pain.  She has had good relief following temporary spinal cord stimulator lead placement.  Placement of permanent paddle is planned.    Procedure:  The patient was identified in the holding area, and the surgery site marked, consent was obtained.  The patient was brought back to the operating room and intubated by anesthesia service.  2 grams Ancef was administered intravenously.  She was transferred to the operating room table in a prone manner and all pressure points well padded.  Her thoracic and lumbar regions were prepped with hair clipping, chlorhexidine and betadine scrub, and Chloroprep.  Sterile drapes were applied including a layer of Ioban.  The correct vertebral levels were identified flouroscopically. The planned midline incision was infiltrated with 0.5% Marcaine/epinephrine.  The skin was incised sharply and dissection carried deeply using monopolar cautery..  The T10 lamina was exposed; this was verified flouroscopically.  The superior aspect of T10 was removed using the high-speed drill and Kerrison Rogers.  The thecal sac was identified.  A spinal cord stimulator paddle was placed, fluoroscopy verified midline position.  The leads were secured to the adjacent spinous process using provided Silastic sleeves, with a suture being placed through the spinous process.  A right flank incision and pouch were created.  She did not wish the generator to be placed laterally or anteriorly.  Briefly, the overlying skin was infiltrated with 0.5% Marcaine with epinephrine.  The skin was  incised sharply, and a pocket created using monopolar cautery.  A lead passer was placed between the lumbar and flank incisions, and the leads passed through to the flank incision.  The leads were attached to a spinal cord stimulator battery, the wires looped on the deep portion of the pouch and the battery pack placed subcutaneously.  The operative sites were both copiously irrigated with normal saline bacitracin irrigation.  Surgi-Kody and Gelfoam were placed over the thecal sac.  The lumbar fascia was reapproximated using 1-0 Vicryl suture in an interrupted inverted manner.  The dermis was reapproximated using 3-0 Vicryl suture in an inverted and interrupted manner.  The skin was reapproximated using staples.  The flank incision was reapproximated with 3-0 Vicryl sutures in an inverted interrupted manner through the dermis followed by Steri-Strips.  Good function of the battery was verified by the rep.    sterile dressings were applied.  Final counts were correct.    FINDINGS: Good midline placement of spinal cord stimulator paddle, at the T8-T9 level.  SPECIMEN:  None  DRAINS: None  EBL: 15 CC  COMPLICATIONS:  None apparent at end of procedure.

## 2018-10-26 NOTE — OR NURSING
ADDENDUM    PT CONTINUES TO BE WEEPY ABOUT RELATIONAL ISSUES, SURGICAL SITE PAIN, SWINGS BACK IN FORTH IN MOOD, DEMANDING ONE MINUTE ABOUT NOT BEING GIVEN ANYTHING TO EAT EVEN THOUGH SHE WAS OFFERED PO FLUIDS AND CRACKERS ONCE SHE WAS NO LONGER NAUSEATED BUT SHE WAS IN TOO MUCH PAIN AND CRYING AND DIDN'T WANT ANYTHING OR WAS IN DEEP SLEEP AFTER RECEIVING MULTIPLE DOSES OF PAIN MEDICATION.    PT DEMANDED TO GET DRESSED, ATTEMPTED TO GET OUT OF BED, ASSISTED WITH GARMENT BAG, MONITOR EQUIPMENT REMOVED AND IV SAL LOC TO MAKE DRNG EASIER, PT WANTED TO ATTEMPT HERSELF WHILE SITTING UP IN BED, COULD NOT INDEPENDENTLY GET PANTS ON, 2 RNS ASSISTED WHEN ASKED, PT ABLE TO LIE ALMOST FLAT AND LIFT BOTTOM.     PT DEMANDED AND CRIED HOW SHE HAD NOTHING TO EAT. A BOXED LUNCH WAS PROCURED FROM PREOP, LUNCH SET UP, PT ATE 75% THEN BECAME NAUSEATED, EMESIS BAG GIVEN, PT DEMANDED COLD WASH CLOTH WHICH WAS GIVEN, HALOPERIDOL IV GIVEN FOR NAUSEA. PT WAS ANXIOUS FELT LIKE SHE COULDN'T BREATH, VSS, 100% ON 2L NC, EFFECTIVE VENTILATIONS, CLEAR LUNGS, PT REASSURED SHE WAS HAVING AN ANXIETY/PANIC ATTACK, IT HAS BEEN A ROUGH TIME FOR HER EMOTIONALLY IN RECOVERY AND SHE HAS BEEN VERY ANXIOUS. ENCOURAGED TO REST AND SLEEP A LITTLE AND SHE WILL FEEL BETTER. PT ABLE TO FALL QUICKLY BACK TO SLEEP AFTER SOME THERAPEUTIC TOUCH AND PRESENCE.

## 2018-10-26 NOTE — OR NURSING
DR TUCKER CEBALLOS IN OR CASE, CIRCULATING RN RELAYING MESSAGE LAST CASE IS NOT MEETING PACU PHASE I CRITERIA FOR DISCHARGE HOME TODAY DUE TO PAIN MANAGEMENT ISSUES. RN WILL RELAY MD TO COME TO BEDSIDE TO DISCUSS POC.

## 2018-10-26 NOTE — OR NURSING
DR CEBALLOS STILL IN OR    CIRCULATING RN STATED SURGEON WILL STILL BE IN SURGERY FOR ANOTHER 1.5HRS. RN WILL CALL BACK TO GET UPDATE . DR CEBALLOS'S PA RETURNED CALL, GAVE VERBAL ADMIT ORDER FOR PT TO GO TO CDU FOR OBSERVATION. DR CEBALLOS WILL WRITE ADMIT ORDERS WHEN CURRENT CASE OVER.     SOM AYON RN AWARE.

## 2018-10-26 NOTE — DISCHARGE INSTRUCTIONS
ACTIVITY: Rest and take it easy for the first 24 hours.  A responsible adult is recommended to remain with you during that time.  It is normal to feel sleepy.  We encourage you to not do anything that requires balance, judgment or coordination.    MILD FLU-LIKE SYMPTOMS ARE NORMAL. YOU MAY EXPERIENCE GENERALIZED MUSCLE ACHES, THROAT IRRITATION, HEADACHE AND/OR SOME NAUSEA.    FOR 24 HOURS DO NOT:  Drive, operate machinery or run household appliances.  Drink beer or alcoholic beverages.   Make important decisions or sign legal documents.    SPECIAL INSTRUCTIONS:   Laminectomy, Care After  Refer to this sheet in the next few weeks. These instructions provide you with information about caring for yourself after your procedure. Your health care provider may also give you more specific instructions. Your treatment has been planned according to current medical practices, but problems sometimes occur. Call your health care provider if you have any problems or questions after your procedure.  WHAT TO EXPECT AFTER THE PROCEDURE  After your procedure, it is common to have some pain around the incision.  HOME CARE INSTRUCTIONS  Bathing  · You may shower after the bandage (dressing) has been removed or as directed by your health care provider.  · Do not take baths, swim, or use a hot tub for 2 weeks or until your incision has healed completely. Check with your health care provider before you start any of these activities.  Incision Care  · There are many different ways to close and cover an incision, including stitches, skin glue, and adhesive strips. Follow instructions from your health care provider about:  ¨ Incision care.  ¨ Dressing changes and removal.  ¨ Incision closure removal.  · Check your incision area every day for signs of infection. Watch for:  ¨ Redness, swelling, or pain.  ¨ Fluid, blood, or pus.  Activity  · Return to your normal activities as directed by your health care provider. Ask your health care  provider what activities are safe for you.  · Perform breathing exercises if directed by your health care provider.  · Avoid bending or twisting at your waist. Always bend at your knees.  · Do not sit for more than 20-30 minutes at a time. Lie down or walk between periods of sitting.  · Do not lift anything that is heavier than 10 lb (4.5 kg).  · Do not drive for 2 weeks after your procedure or as directed by your health care provider. You may be a passenger for 20-30 minute trips.  · Do not drive or operate heavy machinery while taking pain medicine.  General Instructions  · Take medicines only as directed by your health care provider.  · Keep all follow-up visits as directed by your health care provider. This is important.  SEEK MEDICAL CARE IF:  · You have redness, swelling, or increasing pain in the area of your incision.  · You notice a bad smell coming from the incision or dressing.  · You are not able to return to activities or perform exercises as instructed by your health care provider.  SEEK IMMEDIATE MEDICAL CARE IF:  · You develop a rash.  · You have fluid, blood, or pus coming from your incision.  · You have chills or a fever.  · You have episodes of dizziness or fainting while standing.  · You develop shortness of breath or you have difficulty breathing.  · You lose the ability to control your bladder or bowel.  · You become weak.  · You cannot use your legs.     This information is not intended to replace advice given to you by your health care provider. Make sure you discuss any questions you have with your health care provider.    DIET: To avoid nausea, slowly advance diet as tolerated, avoiding spicy or greasy foods for the first day.  Add more substantial food to your diet according to your physician's instructions.  INCREASE FLUIDS AND FIBER TO AVOID CONSTIPATION.    SURGICAL DRESSING/BATHING: Follow instructions given to you by your doctor.    FOLLOW-UP APPOINTMENT:  A follow-up appointment  should be arranged with your doctor in 1-2; call to schedule.    You should CALL YOUR PHYSICIAN if you develop:  Fever greater than 101 degrees F.  Pain not relieved by medication, or persistent nausea or vomiting.  Excessive bleeding (blood soaking through dressing) or unexpected drainage from the wound.  Extreme redness or swelling around the incision site, drainage of pus or foul smelling drainage.  Inability to urinate or empty your bladder within 8 hours.  Problems with breathing or chest pain.    You should call 911 if you develop problems with breathing or chest pain.  If you are unable to contact your doctor or surgical center, you should go to the nearest emergency room or urgent care center.  Physician's telephone #: [phone]    If any questions arise, call your doctor.  If your doctor is not available, please feel free to call the Surgical Center at [phone].  The Center is open Monday through Friday from 7AM to 7PM.  You can also call the HEALTH HOTLINE open 24 hours/day, 7 days/week and speak to a nurse at [phone], or toll free at [phone].    A registered nurse may call you a few days after your surgery to see how you are doing after your procedure.    MEDICATIONS: Resume taking daily medication.  Take prescribed pain medication with food.  If no medication is prescribed, you may take non-aspirin pain medication if needed.  PAIN MEDICATION CAN BE VERY CONSTIPATING.  Take a stool softener or laxative such as senokot, pericolace, or milk of magnesia if needed.    Prescription given for Keflex (antibiotic) and Norco (pain medication) .  Last pain medication given at 1222. Next dose due at _______    If your physician has prescribed pain medication that includes Acetaminophen (Tylenol), do not take additional Acetaminophen (Tylenol) while taking the prescribed medication.    Depression / Suicide Risk    As you are discharged from this [facility] facility, it is important to learn  how to keep safe from harming yourself.    Recognize the warning signs:  · Abrupt changes in personality, positive or negative- including increase in energy   · Giving away possessions  · Change in eating patterns- significant weight changes-  positive or negative  · Change in sleeping patterns- unable to sleep or sleeping all the time   · Unwillingness or inability to communicate  · Depression  · Unusual sadness, discouragement and loneliness  · Talk of wanting to die  · Neglect of personal appearance   · Rebelliousness- reckless behavior  · Withdrawal from people/activities they love  · Confusion- inability to concentrate     If you or a loved one observes any of these behaviors or has concerns about self-harm, here's what you can do:  · Talk about it- your feelings and reasons for harming yourself  · Remove any means that you might use to hurt yourself (examples: pills, rope, extension cords, firearm)  · Get professional help from the community (Mental Health, Substance Abuse, psychological counseling)  · Do not be alone:Call your Safe Contact- someone whom you trust who will be there for you.  · Call your local CRISIS HOTLINE 144-3061 or 606-651-1381  · Call your local Children's Mobile Crisis Response Team [place] (976) 362-2211 or www.[employer] Anytime | Anywhere  · Call the toll free National Suicide Prevention Hotlines   · National Suicide Prevention Lifeline 505-814-YYRV (2668)  · National Hope Line Network 800-SUICIDE (182-6622)

## 2018-10-27 NOTE — PROGRESS NOTES
"In to check on patient.  Pt now awake, tearful.  Pt states \"my  abandoned me\".  Per PACU RN, pt had verbal \"fight\" w/  on the phone and he was \"unreachable most of the day\" which heightened her anxiety and possibly pain.  Pt given her phone to call family members and she throws it down initially \"no one cares about me\".  Comfort provided.  Attempted to complete admit profile and pt became very upset stating \"You are NOT admitting me.  I was supposed to go home today\".  Pt aware that she was admitted for pain control and also did not have an arranged ride home.  Pt demanded pain medication at this time and informed that we are awaiting orders from surgeon as he has been in a long surgery and is aware of her.  Pt called her  and demanded he come get her now.  Pt states she is going home at this time and that the surgeon admitted her without her approval.  Pt aware that Dr Roman is being paged at this time and she states \"I don't give a damn.  I never should have been here and I am not staying.  Take this IV out now or I'll rip it out myself.\"  Pt has been up to the BR with steady gait, only requires partial assistance to get from lying in bed to upright position.  Paging Dr Roman at this time.  "

## 2018-10-27 NOTE — PROGRESS NOTES
Pt remains sleeping, arouses to loud verbal stimuli but drowsy and falls asleep during conversation.  VSS.  Admit profile deferred.

## 2018-10-27 NOTE — PROGRESS NOTES
Pt has arrived to the unit, tearful, anxious but drowsy.  Pt sleeping at this time.  Pt required assist of 1 and ambulated to the bed.  Call light in hand.  Fall precautions in place.

## 2018-10-27 NOTE — PROGRESS NOTES
"Spoke with JESSICA Bautista who gave TO for 10mg norco q 4 hours PRN severe pain and 5mg norco q 4 hours PRN moderate pain.  Spoke with patient who states this is what she takes at home and is \"better off healing at home\".  Pt very irritable, anxious, tearful and will not stay in bed.  Pt currently dressing herself.  Pt and her  having volatile phone conversations and pt is inconsolable.  Attempted to encourage patient to stay, but she is not redirectable and unwilling to converse with RN any further.  Pt has ride home,  who can support her at home, is ambulatory, a/o x 4 and has 2 prescriptions in the chart which were given to her.  This was all discussed with JESSICA Bautista.  Pt dressed, in a wheelchair and waiting for  to arrive.  "

## 2018-10-27 NOTE — PROGRESS NOTES
here for pick-up.  Patient and  given discharge instructions pre-entered into EPIC and RX x 2 with instructions.  Pt advised to follow-up with surgeon ASAP and to return to the hospital for any further needs.  PIV has been d/c'd with tip intact.  Pt escorted out via w/c to parking area by unit tech accompanied by .  All belongings in possession at discharge.

## 2018-11-30 ENCOUNTER — HOSPITAL ENCOUNTER (OUTPATIENT)
Dept: LAB | Facility: MEDICAL CENTER | Age: 63
End: 2018-11-30
Attending: NURSE PRACTITIONER
Payer: MEDICARE

## 2018-11-30 ENCOUNTER — HOSPITAL ENCOUNTER (OUTPATIENT)
Dept: RADIOLOGY | Facility: MEDICAL CENTER | Age: 63
End: 2018-11-30
Attending: NURSE PRACTITIONER
Payer: MEDICARE

## 2018-11-30 DIAGNOSIS — Z72.0 TOBACCO ABUSE: ICD-10-CM

## 2018-11-30 DIAGNOSIS — R93.89 ABNORMAL FINDINGS ON IMAGING TEST: ICD-10-CM

## 2018-11-30 LAB
ALBUMIN SERPL BCP-MCNC: 4 G/DL (ref 3.2–4.9)
ALBUMIN/GLOB SERPL: 1.3 G/DL
ALP SERPL-CCNC: 89 U/L (ref 30–99)
ALT SERPL-CCNC: 19 U/L (ref 2–50)
ANION GAP SERPL CALC-SCNC: 6 MMOL/L (ref 0–11.9)
AST SERPL-CCNC: 14 U/L (ref 12–45)
BILIRUB SERPL-MCNC: 0.3 MG/DL (ref 0.1–1.5)
BUN SERPL-MCNC: 17 MG/DL (ref 8–22)
CALCIUM SERPL-MCNC: 9.6 MG/DL (ref 8.5–10.5)
CHLORIDE SERPL-SCNC: 105 MMOL/L (ref 96–112)
CHOLEST SERPL-MCNC: 168 MG/DL (ref 100–199)
CO2 SERPL-SCNC: 30 MMOL/L (ref 20–33)
CREAT SERPL-MCNC: 1 MG/DL (ref 0.5–1.4)
EST. AVERAGE GLUCOSE BLD GHB EST-MCNC: 266 MG/DL
FASTING STATUS PATIENT QL REPORTED: NORMAL
GLOBULIN SER CALC-MCNC: 3 G/DL (ref 1.9–3.5)
GLUCOSE SERPL-MCNC: 78 MG/DL (ref 65–99)
HBA1C MFR BLD: 10.9 % (ref 0–5.6)
HDLC SERPL-MCNC: 61 MG/DL
LDLC SERPL CALC-MCNC: 87 MG/DL
POTASSIUM SERPL-SCNC: 3.8 MMOL/L (ref 3.6–5.5)
PROT SERPL-MCNC: 7 G/DL (ref 6–8.2)
SODIUM SERPL-SCNC: 141 MMOL/L (ref 135–145)
TRIGL SERPL-MCNC: 101 MG/DL (ref 0–149)
TSH SERPL DL<=0.005 MIU/L-ACNC: 14.49 UIU/ML (ref 0.38–5.33)

## 2018-11-30 PROCEDURE — 84443 ASSAY THYROID STIM HORMONE: CPT

## 2018-11-30 PROCEDURE — 71250 CT THORAX DX C-: CPT

## 2018-11-30 PROCEDURE — 80061 LIPID PANEL: CPT

## 2018-11-30 PROCEDURE — 36415 COLL VENOUS BLD VENIPUNCTURE: CPT

## 2018-11-30 PROCEDURE — 83036 HEMOGLOBIN GLYCOSYLATED A1C: CPT

## 2018-11-30 PROCEDURE — 80053 COMPREHEN METABOLIC PANEL: CPT

## 2019-01-24 ENCOUNTER — APPOINTMENT (OUTPATIENT)
Dept: ENDOCRINOLOGY | Facility: MEDICAL CENTER | Age: 64
End: 2019-01-24
Payer: MEDICARE

## 2019-01-30 ENCOUNTER — OFFICE VISIT (OUTPATIENT)
Dept: ENDOCRINOLOGY | Facility: MEDICAL CENTER | Age: 64
End: 2019-01-30
Payer: MEDICARE

## 2019-01-30 VITALS
WEIGHT: 149 LBS | OXYGEN SATURATION: 96 % | SYSTOLIC BLOOD PRESSURE: 163 MMHG | HEART RATE: 107 BPM | DIASTOLIC BLOOD PRESSURE: 93 MMHG | BODY MASS INDEX: 23.95 KG/M2 | HEIGHT: 66 IN

## 2019-01-30 DIAGNOSIS — E89.0 HYPOTHYROIDISM, POSTSURGICAL: ICD-10-CM

## 2019-01-30 DIAGNOSIS — E10.22 TYPE 1 DIABETES MELLITUS WITH DIABETIC CHRONIC KIDNEY DISEASE, UNSPECIFIED CKD STAGE (HCC): ICD-10-CM

## 2019-01-30 DIAGNOSIS — Z79.4 ENCOUNTER FOR LONG-TERM (CURRENT) USE OF INSULIN (HCC): ICD-10-CM

## 2019-01-30 PROCEDURE — 99204 OFFICE O/P NEW MOD 45 MIN: CPT | Performed by: PHYSICIAN ASSISTANT

## 2019-01-30 RX ORDER — LISINOPRIL 10 MG/1
20 TABLET ORAL EVERY EVENING
Refills: 0 | COMMUNITY
Start: 2019-01-21 | End: 2019-10-22

## 2019-01-30 NOTE — PROGRESS NOTES
New Patient Consult Note  Referred by: Jarrell Yates M.D.    Reason for consult: Diabetes Management Type 1    HPI:  Anu Manuel is a 63 y.o. old patient who is seeing us today for diabetes care.  This is a pleasant patient with diabetes and I appreciate the opportunity to participate in the care of this patient.  This is a new patient with me today.    Labs of 11/30/18 GFR >60, TSH 14.4, HbA1c is 10.9  Labs of 3/28/18 TSH 11.5    BG Diary:1/30/2019  In the AM:  No log    Has been Diabetic since Age 34 T1  Has a Glucagon pen at home: no    Has seen Trevor in the past    1. Type 1 diabetes mellitus with neurological manifestations, uncontrolled (HCC)  This is a new patient with me on 1/30/19  They are on:  1.  Novolog (not checking before eating)  Carb ratio 1:5   ICF 1:50 above 250  2.  Lantus 20 units at night  No CGM    Has a steroid shot for back pain      2. Encounter for long-term (current) use of insulin (HCC)  Is on a high risk medication Insulin and we will continue to follow       3. Hypothyroidism, postsurgical  1.  Synthroid 175    She is not well controlled and new labs ordered    4. Type 1 diabetes mellitus with diabetic chronic kidney disease, unspecified CKD stage (HCC)  Very poor control and she is teary about this, she wants better control    ROS:   Constitutional: No change in weight , No fatigue, No night sweats.  HEENT: No Headache.  Eyes:  No blurred vision, No visual changes.  Cardiac: No chest pain, No palpitations.  Resp: No shortness of breath, No cough,   Gastro: No nausea or vomiting, No diarrhea.  Neuro: Denies numbness or tinging in bilateral feet or hands, and no loss of sensation.  Endo: No heat or cold intolerance.  : No polyuria, No polydipsia, No chronic UTI's.  Lower extremities: No lower leg edema bilateral.  All other systems were reviewed and were negative.    Past Medical History:  Patient Active Problem List    Diagnosis Date Noted   • Cellulitis 03/15/2018      Priority: High   • Chronic pain of right lower extremity 03/15/2018   • Acute left lumbar radiculopathy 08/31/2016   • Lumbar spinal stenosis 05/10/2016   • Type 1 diabetes mellitus with neurological manifestations, uncontrolled ([facility]) 06/10/2015   • Diabetic polyneuropathy (CMS-[facility]) 06/10/2015   • Vertigo 04/08/2015   • Encounter for long-term (current) use of insulin ([facility]) 09/25/2013   • Accidental drug overdose 08/27/2012   • Vitamin d deficiency 03/14/2012   • Hypothyroidism, postsurgical    • Diabetes mellitus type 1 ([facility])    • Cigarette smoker        Past Surgical History:  Past Surgical History:   Procedure Laterality Date   • SPINAL CORD STIMULATOR N/A 10/26/2018    Procedure: SPINAL CORD STIMULATOR;  Surgeon: Ryan Roman M.D.;  Location: SURGERY [facility];  Service: Neurosurgery   • THORACIC LAMINECTOMY N/A 10/26/2018    Procedure: THORACIC LAMINECTOMY - FOR;  Surgeon: Ryan Roman M.D.;  Location: [facility];  Service: Neurosurgery   • PB INJ,FORAMEN,L/S,1 LEVEL Right 8/31/2016    Procedure: INJ-FORAMEN EPI LUM/SAC SNGL L4-5;  Surgeon: Sukhi Godfrey M.D.;  Location: [facility];  Service: Pain Management   • LUMBAR LAMINECTOMY DISKECTOMY Right 5/10/2016    Procedure: LUMBAR L4-5 HEMILAMINECTOMY DISKECTOMY ;  Surgeon: Arnold Keyes M.D.;  Location: [facility];  Service:    • CERVICAL FUSION POSTERIOR  1/16/2009    Performed by TARA CONTRERAS at [facility]   • HARDWARE REMOVAL NEURO  1/16/2009    Performed by TARA CONTRERAS at [facility]   • NECK EXPLORATION  1/16/2009    Performed by TARA CONTRERAS at [facility]   • SHOULDER ARTHROSCOPY W/ ROTATOR CUFF REPAIR  10/9/08    Performed by SHERLY CASTANEDA at [facility]   • SHOULDER DECOMPRESSION ARTHROSCOPIC  6/17/08    Performed by SHERLY CASTANEDA at [facility]   • CLAVICLE DISTAL EXCISION  6/17/08    Performed by SHERLY CASTANEDA at SURGERY  [facility]   • CERVICAL DISK AND FUSION ANTERIOR  03/12/08   • HYSTERECTOMY, VAGINAL  2006   • APPENDECTOMY  2004   • THYROID LOBECTOMY  1973   • TONSILLECTOMY  1963   • ABDOMINAL HYSTERECTOMY TOTAL     • LUMPECTOMY  1976, 2005    Breast    • LUMPECTOMY         Allergies:  Ativan    Social History:  Social History     Social History   • Marital status:      Spouse name: N/A   • Number of children: N/A   • Years of education: N/A     Occupational History   • Not on file.     Social History Main Topics   • Smoking status: Current Every Day Smoker     Packs/day: 0.50     Years: 30.00     Last attempt to quit: 9/8/2013   • Smokeless tobacco: Current User      Comment: 1 ppd    • Alcohol use No      Comment:     • Drug use: No   • Sexual activity: Not on file     Other Topics Concern   • Not on file     Social History Narrative   • No narrative on file       Family History:  Family History   Problem Relation Age of Onset   • Hypertension Mother    • Cancer Father        Medications:    Current Outpatient Prescriptions:   •  lisinopril (PRINIVIL) 10 MG Tab, TAKE 1 TABLET BY MOUTH DAILY, Disp: , Rfl: 0  •  NOVOLOG, insulin aspart, (NOVOLOG FLEXPEN) 100 UNIT/ML Solution Pen-injector injection, Inject 1-5 Units as instructed 3 times a day before meals. Blood sugar 151-200 = 1 unit 201-250 = 2 units 251-300 = 3 units 301-350 = 4 units 351-400 = 5 units Carb count 1 unit/5 carbs, Disp: , Rfl:   •  Docusate Calcium (STOOL SOFTENER PO), Take 1 Tab by mouth every bedtime., Disp: , Rfl:   •  levothyroxine (SYNTHROID) 175 MCG Tab, Take 175 mcg by mouth Every morning on an empty stomach., Disp: , Rfl:   •  insulin glargine (LANTUS) 100 UNIT/ML SOLN, Inject 20 Units as instructed every evening. 10 units night prior to surgery, Disp: , Rfl:   •  Calcium Carbonate-Vitamin D (CALCIUM 600/VITAMIN D PO), Take 1 Tab by mouth every bedtime., Disp: , Rfl:       Physical Examination:   Vital signs: BP (!) 163/93 (BP Location:  "Left arm, Patient Position: Sitting)   Pulse (!) 107   Ht 1.676 m (5' 6\")   Wt 67.6 kg (149 lb)   LMP 04/29/2005 (LMP Unknown)   SpO2 96%   BMI 24.05 kg/m²   General: No distress, cooperative, well dressed and well nourished.   Eyes: No scleral icterus or discharge, No hyposphagma  ENMT: Normal on external inspection of nose, lips, No nasal drainage   Neck: No abnormal masses on inspection  Resp: Normal effort, Bilateral clear to auscultation, No wheezing, No rales  CVS: Regular rate and rhythm, S1 S2 normal, No murmur. No gallop  Extremities: No edema bilateral extremities  Neuro: Alert and oriented  Skin: No rash, No Ulcers  Psych: Normal mood and affect      Assessment and Plan:    1. Type 1 diabetes mellitus with neurological manifestations, uncontrolled (HCC)    They are on:  1.  Novolog   Carb ratio 1:5   Start a 1:50 above 150  150-200 1 units  201 -250 2 units  251 -300 3 units  301 - 350 4 units  351 - 400 5 units  401 - 450 6 units  451 - 500 7 units  501 - 550 8 units  600 - 650 9 units      2.  Lantus 20 units at night (STOP)  3.  Start Tresiba 20 units at at night before bed  4.  Abbott Pro     We need much better control she is scared about going low, we will slowly get her under better control so she can learn to trust her insulin    2. Encounter for long-term (current) use of insulin (HCC)  She is checking 5 times a day and under poor control.  SHe is in need of a Dexcom 5 CGM for better control    3. Hypothyroidism, postsurgical  1.  Synthroid 175    She is not well controlled and new labs ordered    4. Type 1 diabetes mellitus with diabetic chronic kidney disease, unspecified CKD stage (HCC)  She is under poor control and I will set her up to see our nurse educator Nathaly Sainz in about 2 weeks (around 2/13/2019).    Blood glucose log: Check BG in the morning when wake up, before lunch or dinner and before bed.  So three times a day.  Always bring BG diary to the next office visit.   "   This patient during there office visit was started on new medication Tresiba.  Side effects of new medications were discussed with the patient today in the office. The patient was supplied paperwork on this new medication.    Thank you kindly for allowing me to participate in the diabetes care plan for this patient.    Pantera Mobley PA-C, [facility]  Board Certified - Advanced Diabetes Management  01/30/19    CC:   Jrarell Yates M.D.

## 2019-02-12 ENCOUNTER — OFFICE VISIT (OUTPATIENT)
Dept: ENDOCRINOLOGY | Facility: MEDICAL CENTER | Age: 64
End: 2019-02-12
Payer: MEDICARE

## 2019-02-12 VITALS
HEIGHT: 66 IN | HEART RATE: 105 BPM | OXYGEN SATURATION: 99 % | BODY MASS INDEX: 23.63 KG/M2 | SYSTOLIC BLOOD PRESSURE: 122 MMHG | WEIGHT: 147 LBS | DIASTOLIC BLOOD PRESSURE: 70 MMHG

## 2019-02-12 DIAGNOSIS — Z79.4 ENCOUNTER FOR LONG-TERM (CURRENT) USE OF INSULIN (HCC): ICD-10-CM

## 2019-02-12 LAB
HBA1C MFR BLD: 11.7 % (ref ?–5.8)
INT CON NEG: NEGATIVE
INT CON POS: POSITIVE

## 2019-02-12 PROCEDURE — 83036 HEMOGLOBIN GLYCOSYLATED A1C: CPT | Performed by: PHYSICIAN ASSISTANT

## 2019-02-12 PROCEDURE — 99214 OFFICE O/P EST MOD 30 MIN: CPT | Performed by: PHYSICIAN ASSISTANT

## 2019-02-12 PROCEDURE — 95250 CONT GLUC MNTR PHYS/QHP EQP: CPT | Performed by: PHYSICIAN ASSISTANT

## 2019-02-12 NOTE — PROGRESS NOTES
Return to office Patient Consult Note  Referred by: Jarrell Yates M.D.    Reason for consult:  Diabetes Type 1    HPI:  Anu Manuel is a 63 y.o. old patient who is seeing us today for diabetes care.  This is a pleasant patient with diabetes and I appreciate the opportunity to participate in the care of this patient.    Labs of 2/12/19 HbA1c is 11.7  Labs of 11/30/18 GFR >60, TSH 14.4, HbA1c is 10.9  Labs of 3/28/18 TSH 11.5    BG Diary:2/12/2019  In the AM:  See Abbott Libr Pro:  Zi BG is 343        1. Type 1 diabetes mellitus with neurological manifestations, uncontrolled (HCC)  This is a new patient with me on 1/30/19    They are on:  1.  Novolog   Carb ratio 1:5   Start a 1:50 above 150  150-200 1 units  201 -250 2 units  251 -300 3 units  301 - 350 4 units  351 - 400 5 units  401 - 450 6 units  451 - 500 7 units  501 - 550 8 units  600 - 650 9 units  2.  Tresiba 20 units at night        2.  Lantus 20 units at night (STOP)  3.  Start Tresiba 20 units at at night before bed  4.  Abbott Pro     2. Encounter for long-term (current) use of insulin (HCC)  Is on a high risk medication Insulin and we will continue to follow     We sent in for a Dexcom and she has been playing phone tag     3. Hypothyroidism, postsurgical  1.  Synthroid 175        ROS:   Constitutional: No night sweats.  Eyes:  No visual changes.  Cardiac: No chest pain, No palpitations or racing heart rate.  Resp: No shortness of breath, No cough,   Gi: No Diarrhea    All other systems were reviewed and were/are negative 1/30/19.  The ROS was revised/revisited during this office visit from the patients first office visit with me on... Please review the full ROS during the first office visit.    Past Medical History:  Patient Active Problem List    Diagnosis Date Noted   • Cellulitis 03/15/2018     Priority: High   • Chronic pain of right lower extremity 03/15/2018   • Acute left lumbar radiculopathy 08/31/2016   • Lumbar spinal stenosis  05/10/2016   • Type 1 diabetes mellitus with neurological manifestations, uncontrolled ([facility]) 06/10/2015   • Diabetic polyneuropathy (CMS-HCC) 06/10/2015   • Vertigo 04/08/2015   • Encounter for long-term (current) use of insulin ([facility]) 09/25/2013   • Accidental drug overdose 08/27/2012   • Vitamin d deficiency 03/14/2012   • Hypothyroidism, postsurgical    • Diabetes mellitus type 1 ([facility])    • Cigarette smoker        Past Surgical History:  Past Surgical History:   Procedure Laterality Date   • SPINAL CORD STIMULATOR N/A 10/26/2018    Procedure: SPINAL CORD STIMULATOR;  Surgeon: Ryan Roman M.D.;  Location: SURGERY [facility];  Service: Neurosurgery   • THORACIC LAMINECTOMY N/A 10/26/2018    Procedure: THORACIC LAMINECTOMY - FOR;  Surgeon: Ryan Roman M.D.;  Location: [facility];  Service: Neurosurgery   • PB INJ,FORAMEN,L/S,1 LEVEL Right 8/31/2016    Procedure: INJ-FORAMEN EPI LUM/SAC SNGL L4-5;  Surgeon: Sukhi Godfrey M.D.;  Location: [facility];  Service: Pain Management   • LUMBAR LAMINECTOMY DISKECTOMY Right 5/10/2016    Procedure: LUMBAR L4-5 HEMILAMINECTOMY DISKECTOMY ;  Surgeon: Arnold Keyes M.D.;  Location: [facility];  Service:    • CERVICAL FUSION POSTERIOR  1/16/2009    Performed by TARA CONTRERAS at [facility]   • HARDWARE REMOVAL NEURO  1/16/2009    Performed by TARA CONTRERAS at [facility]   • NECK EXPLORATION  1/16/2009    Performed by TARA CONTRERAS at [facility]   • SHOULDER ARTHROSCOPY W/ ROTATOR CUFF REPAIR  10/9/08    Performed by SHERLY CASTANEDA at [facility]   • SHOULDER DECOMPRESSION ARTHROSCOPIC  6/17/08    Performed by SHERLY CASTANEDA at [facility]   • CLAVICLE DISTAL EXCISION  6/17/08    Performed by SHERLY CASTANEDA at [facility]   • CERVICAL DISK AND FUSION ANTERIOR  03/12/08   • HYSTERECTOMY, VAGINAL  2006   • APPENDECTOMY  2004   • THYROID LOBECTOMY   "1973   • TONSILLECTOMY  1963   • ABDOMINAL HYSTERECTOMY TOTAL     • LUMPECTOMY  1976, 2005    Breast    • LUMPECTOMY         Allergies:  Ativan    Social History:  Social History     Social History   • Marital status:      Spouse name: N/A   • Number of children: N/A   • Years of education: N/A     Occupational History   • Not on file.     Social History Main Topics   • Smoking status: Current Every Day Smoker     Packs/day: 0.50     Years: 30.00     Last attempt to quit: 9/8/2013   • Smokeless tobacco: Current User      Comment: 1 ppd    • Alcohol use No      Comment:     • Drug use: No   • Sexual activity: Not on file     Other Topics Concern   • Not on file     Social History Narrative   • No narrative on file       Family History:  Family History   Problem Relation Age of Onset   • Hypertension Mother    • Cancer Father        Medications:    Current Outpatient Prescriptions:   •  lisinopril (PRINIVIL) 10 MG Tab, TAKE 1 TABLET BY MOUTH DAILY, Disp: , Rfl: 0  •  NOVOLOG, insulin aspart, (NOVOLOG FLEXPEN) 100 UNIT/ML Solution Pen-injector injection, Inject 1-5 Units as instructed 3 times a day before meals. Blood sugar 151-200 = 1 unit 201-250 = 2 units 251-300 = 3 units 301-350 = 4 units 351-400 = 5 units Carb count 1 unit/5 carbs, Disp: , Rfl:   •  Calcium Carbonate-Vitamin D (CALCIUM 600/VITAMIN D PO), Take 1 Tab by mouth every bedtime., Disp: , Rfl:   •  levothyroxine (SYNTHROID) 175 MCG Tab, Take 175 mcg by mouth Every morning on an empty stomach., Disp: , Rfl:   •  insulin glargine (LANTUS) 100 UNIT/ML SOLN, Inject 20 Units as instructed every evening. 10 units night prior to surgery, Disp: , Rfl:   •  Docusate Calcium (STOOL SOFTENER PO), Take 1 Tab by mouth every bedtime., Disp: , Rfl:         Physical Examination:   Vital signs: /70 (BP Location: Left arm, Patient Position: Sitting)   Pulse (!) 105   Ht 1.676 m (5' 6\")   Wt 66.7 kg (147 lb)   LMP 04/29/2005 (LMP Unknown)   SpO2 99%   " BMI 23.73 kg/m²   General: No distress, cooperative, well dressed and well nourished.   Eyes: No scleral icterus or discharge, No hyposphagma  ENMT: Normal on external inspection of nose, lips, No nasal drainage   Neck: No abnormal masses on inspection  Resp: Normal effort, Bilateral clear to auscultation, No wheezing, No rales  CVS: Regular rate and rhythm, S1 S2 normal, No murmur. No gallop  Extremities: No edema bilateral extremities  Neuro: Alert and oriented  Skin: No rash, No Ulcers  Psych: Normal mood and affect      Assessment and Plan:    1. Type 1 diabetes mellitus with neurological manifestations, uncontrolled (HCC)    Now on:  1.  Tresiba 30 units at night  2.  Novolog  Start 1:40 above 100  100-140 1 units  141-180 2 units  181-220 3 units  221-260 4 units  261-300 5 units  301-340 6 units  341-380 7 units  381-420 8 units  Ect…    SHe is under very poor control on Lantus.  SHe is in need of a much better insulin Tresiba and I sent this over to the Pharmacy      2. 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   They also have polyuria which precludes them to move toward renal failure and possible dialysis.  I discussed today with this patient that the leading cause of adult blindness and renal failure and the need for dialysis is uncontrolled diabetes and a glucose-toxic state.  I explained to them the need to get a better handle of their diabetes, because we want to avoid complications if at all possible.     Return in about 1 month (around 3/12/2019).    Blood glucose log: Check BG in the morning when wake up, before lunch or dinner and before bed.  So three times a day.  Always bring BG diary to the next office visit.       Thank you  kindly for allowing me to participate in the diabetes care plan for this patient.    Pantera Mobley PA-C, BC-ADM  Board Certified - Advanced Diabetes Management  02/12/19    CC:   Jarrell Yates M.D.

## 2019-02-12 NOTE — PATIENT INSTRUCTIONS
Now on:  1.  Tresiba 30 units at night  2.  Novolog  Start 1:40 above 100  100-140 1 units  141-180 2 units  181-220 3 units  221-260 4 units  261-300 5 units  301-340 6 units  341-380 7 units  381-420 8 units  Ect…

## 2019-02-13 ENCOUNTER — HOSPITAL ENCOUNTER (OUTPATIENT)
Dept: LAB | Facility: MEDICAL CENTER | Age: 64
End: 2019-02-13
Attending: NURSE PRACTITIONER
Payer: MEDICARE

## 2019-02-13 ENCOUNTER — TELEPHONE (OUTPATIENT)
Dept: ENDOCRINOLOGY | Facility: MEDICAL CENTER | Age: 64
End: 2019-02-13

## 2019-02-13 ENCOUNTER — HOSPITAL ENCOUNTER (OUTPATIENT)
Dept: LAB | Facility: MEDICAL CENTER | Age: 64
End: 2019-02-13
Attending: PHYSICIAN ASSISTANT
Payer: MEDICARE

## 2019-02-13 DIAGNOSIS — Z79.4 ENCOUNTER FOR LONG-TERM (CURRENT) USE OF INSULIN (HCC): ICD-10-CM

## 2019-02-13 LAB
CREAT UR-MCNC: 154.8 MG/DL
MICROALBUMIN UR-MCNC: 2.4 MG/DL
MICROALBUMIN/CREAT UR: 16 MG/G (ref 0–30)
T3FREE SERPL-MCNC: 2.78 PG/ML (ref 2.4–4.2)
T4 FREE SERPL-MCNC: 1.2 NG/DL (ref 0.53–1.43)
THYROPEROXIDASE AB SERPL-ACNC: 5.8 IU/ML (ref 0–9)
TSH SERPL DL<=0.005 MIU/L-ACNC: 1.68 UIU/ML (ref 0.38–5.33)
TSH SERPL DL<=0.005 MIU/L-ACNC: 1.79 UIU/ML (ref 0.38–5.33)

## 2019-02-13 PROCEDURE — 84443 ASSAY THYROID STIM HORMONE: CPT | Mod: 91

## 2019-02-13 PROCEDURE — 86341 ISLET CELL ANTIBODY: CPT | Mod: 91

## 2019-02-13 PROCEDURE — 84439 ASSAY OF FREE THYROXINE: CPT

## 2019-02-13 PROCEDURE — 86376 MICROSOMAL ANTIBODY EACH: CPT

## 2019-02-13 PROCEDURE — 84481 FREE ASSAY (FT-3): CPT

## 2019-02-13 PROCEDURE — 84681 ASSAY OF C-PEPTIDE: CPT

## 2019-02-13 PROCEDURE — 86337 INSULIN ANTIBODIES: CPT

## 2019-02-13 PROCEDURE — 84443 ASSAY THYROID STIM HORMONE: CPT

## 2019-02-13 PROCEDURE — 82043 UR ALBUMIN QUANTITATIVE: CPT

## 2019-02-13 PROCEDURE — 82570 ASSAY OF URINE CREATININE: CPT

## 2019-02-13 PROCEDURE — 36415 COLL VENOUS BLD VENIPUNCTURE: CPT

## 2019-02-13 NOTE — TELEPHONE ENCOUNTER
VOICEMAIL    1. Caller Name: Anu Manuel                          Call Back Number: [phone] (home)       2. Message: Patient called and left a vm questioning her dosing on the Tresiba. Her prescription was sent in for 50 units daily, but her AVS states 30 units.     Please advise correct dosing. Last clinical note indicated 30 units. The rx that was e-scribed on 2/12/19 was for 50 units.    3. Patient approves office to leave a detailed voicemail/MyChart message: yes

## 2019-02-16 LAB
C PEPTIDE SERPL-MCNC: <0.1 NG/ML (ref 0.8–3.5)
PANC ISLET CELL AB TITR SER: NORMAL {TITER}

## 2019-02-17 LAB — GAD65 AB SER IA-ACNC: >250 IU/ML (ref 0–5)

## 2019-02-19 LAB — INSULIN HUMAN AB SER-ACNC: <0.4 U/ML (ref 0–0.4)

## 2019-03-08 ENCOUNTER — NON-PROVIDER VISIT (OUTPATIENT)
Dept: HEALTH INFORMATION MANAGEMENT | Facility: MEDICAL CENTER | Age: 64
End: 2019-03-08
Payer: MEDICARE

## 2019-03-08 DIAGNOSIS — E10.649 UNCONTROLLED TYPE 1 DIABETES MELLITUS WITH HYPOGLYCEMIA WITHOUT COMA (HCC): ICD-10-CM

## 2019-03-08 PROCEDURE — G0108 DIAB MANAGE TRN  PER INDIV: HCPCS | Performed by: INTERNAL MEDICINE

## 2019-03-08 NOTE — LETTER
March 8, 2019      Anu Manuel  MRN:  [id number]      Anu and her  came for 1:1 Type 1 Diabetes Education.  She is currently taking Tresiba 30 units at bedtime and Novolog 1:10 grams carbohydrates plus a 1:50>150 correction.  She has been having quite a few blood sugars in the 40's when she wakes up and also throughout the day.  She is very concerned with the hypoglycemia since she has a past history of passing out and hitting her head. She also has hypoglycemia unawareness.  She will try going down on her Tresiba to 26 units and triturating it as needed to wake up around 100.  I reviewed proper treatment of hypoglycemia.  She is working on getting the Dexcom sensor.  She states it is $348.00 to start and then the monthly co pays.  She states the Tresiba is also expensive for her.  She may have to choose between the Dexcom and the Tresiba.  I have encouraged her to get the Dexcom and go back on Lantus if needed.  She has been snacking a lot especially at night. She will try to follow the plate method of eating and start with a protein.  She will watch the snacking and stacking of insulin.  She may benefit going from Novolog to FIASP since they are the same price on her insurance. She states her back is getting better after her last steroid injection and she has started to be able to walk again.  She understands how to adjust her insulin or snack to cover the increase in activity.  I reviewed all medications and how they work including the GLP 1 and SGLT 2 Inhibitors.  She is very aware of the complications from the high blood sugars.  She is doing daily foot care and having a yearly eye exam.  She will bring her eye exam report with her to her next appointment.  She has not been hospitalized with ketoacidosis since diagnosed in her 30's.  I reviewed signs/symptoms and treatment of ketoacidosis.  She is testing blood sugars throughout the day at least 4 times daily.  She is keeping a food log  along with her blood sugars.  She was encouraged to bring her log to all her appointments.  She verbalized good understanding of the education given and is ready to get her blood sugars in better control.           Thank You for your referrals,      Rabia Raza RN, CDE

## 2019-03-08 NOTE — PROGRESS NOTES
Anu and her  came for 1:1 Type 1 Diabetes Education.  She is currently taking Tresiba 30 units at bedtime and Novolog 1:10 grams carbohydrates plus a 1:50>150 correction.  She has been having quite a few blood sugars in the 40's when she wakes up and also throughout the day.  She is very concerned with the hypoglycemia since she has a past history of passing out and hitting her head. She also has hypoglycemia unawareness.  I reviewed proper treatment of hypoglycemia.  She is working on getting the Dexcom sensor.  She states it is $348.00 to start and then the monthly co pays.  She states the Tresiba is also expensive for her.  She may have to choose between the Dexcom and the Tresiba.  I have encouraged her to get the Dexcom and go back on Lantus if needed.  She has been snacking a lot especially at night. She will try to follow the plate method of eating and start with a protein.  She will watch the snacking and stacking of insulin.  She may benefit going from Novolog to FIASP since they are the same price on her insurance. She states her back is getting better after her last steroid injection and she has started to be able to walk again.  She understands how to adjust her insulin or snack to cover the increase in activity.  I reviewed all medications and how they work including the GLP 1 and SGLT 2 Inhibitors.  She is very aware of the complications from the high blood sugars.  She is doing daily foot care and having a yearly eye exam.  She will bring her eye exam report with her to her next appointment.  She has not been hospitalized with ketoacidosis since diagnosed in her 30's.  I reviewed signs/symptoms and treatment of ketoacidosis .  She is testing blood sugars throughout the day at least 4 times daily.  She is keeping a food log along with her blood sugars.  She was encouraged to bring her log to all her appointments.  She verbalized good understanding of the education given and is ready to get  "her blood sugars in better control.      Diabetes Education Content    Introduction To Diabetes  Define Type 1 diabetes: Education taught  Understand feaures and benefits of education and management: Education taught  Describe who is responsible for diabetes management: Education taught  Describe impact of diabetes on family/friends: Education taught  Discuss role of significant other in management: Education taught  Diabetes Lifestyle Changes / Goals  State benefits of making appropriate lifestyle changes: Education taught  Identify lifestyle behaviors participant wants to change: Education taught  Identify risk factors that interfere with health and strategies to reduce : Education taught  Verbalize need for and frequency of health care follow-up: Education taught  Develop behavioral objectives and expected health outcomes: Education taught  Diabetes Exercise and Activity  Describe role of exercise in diabetes management: Education taught  State relationship of exercise to blood sugar: Education taught  State the benefits/risk(s) of exercise and precautions to follow: Education taught  Diabetes Self Blood Glucose Monitoring  Discuss rationale and importance of SBGM: Education taught  Discuss appropriate record keeping: Education taught  Discuss how to use results from blood glucose testing: Education taught  Evaluation and interpretation of blood glucose patterns: Education taught  Diabetes Disease Process  Discuss signs, symptoms, TX and prevention of hyperglycemia: Education taught  Discuss beta cell dysfunction and insulin resistance: Education taught  Discuss Insulin and its role in the body: Education taught  Discuss the role of the liver in glucose metabolism: Education taught  Discuss hormonal regulation: Education taught  Define benefits of good control and discuss what it means to be in \"good control\": Education taught  Discuss impact of exercise, food, meds, stress, and special factors on diabetes: " Education taught  Discuss when to confer with HCP for possible treatment plan adjustments: Education taught  Diabetes Insulin and Medications  Action of SGLT 2 Inhibitors and precautions: Education taught  Discuss incretin secretagogs and their use in diabetes management: Education taught  Identify the onset, peak and duration of different insulin: Education taught  Discuss proper injection technique and site rotation: Education taught  Discuss storage of insulin and disposal of sharps: Education taught  Hypoglycemia  List signs, symptoms and causes of hypoglycemia: Education taught  Discuss physiology of hypoglycemic reactions: Education taught  Accurately describe appropriate treatment and prevention: Education taught  Discuss when to contact HCP: Education taught  Sick Day Care  Verbalize important items to monitor when sick and when to contact HCP: Education taught  Ketoacidosis recognition/treatment/when to go to the hospital: Education taught  State diabetes medication adjustments on sick days: Education taught  Complications (Chronic)  Explain prevention, TX, signs/symptoms of: retinopathy, neuropathy, nephropathy, infections: Education taught  Explain prevention, TX, signs/symptoms of: CAD, cerebral-vascular disease and sexual dysfunction: Education taught  Identify when to notify HCP of complications: Education taught  State principles of skin, dental and foot care.  Discuss proper foot care, prevention of foot probelms when to notify HCP>: Education taught  Demonstrate how to examine feet and what to look for: Education taught  Psychosocial adjustment  Identify sources of stress: Education taught  Identify resources and techniques for stress reduction: Education taught

## 2019-03-10 ENCOUNTER — APPOINTMENT (OUTPATIENT)
Dept: RADIOLOGY | Facility: MEDICAL CENTER | Age: 64
End: 2019-03-10
Attending: EMERGENCY MEDICINE
Payer: MEDICARE

## 2019-03-10 ENCOUNTER — HOSPITAL ENCOUNTER (EMERGENCY)
Facility: MEDICAL CENTER | Age: 64
End: 2019-03-10
Attending: EMERGENCY MEDICINE
Payer: MEDICARE

## 2019-03-10 VITALS
OXYGEN SATURATION: 98 % | RESPIRATION RATE: 18 BRPM | WEIGHT: 154.32 LBS | HEIGHT: 66 IN | SYSTOLIC BLOOD PRESSURE: 127 MMHG | BODY MASS INDEX: 24.8 KG/M2 | TEMPERATURE: 97.8 F | HEART RATE: 90 BPM | DIASTOLIC BLOOD PRESSURE: 73 MMHG

## 2019-03-10 DIAGNOSIS — S09.8XXA BLUNT HEAD TRAUMA, INITIAL ENCOUNTER: ICD-10-CM

## 2019-03-10 DIAGNOSIS — E16.2 HYPOGLYCEMIA: ICD-10-CM

## 2019-03-10 DIAGNOSIS — S16.1XXA STRAIN OF NECK MUSCLE, INITIAL ENCOUNTER: ICD-10-CM
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
## 2019-03-10 LAB — GLUCOSE BLD-MCNC: 283 MG/DL (ref 65–99)

## 2019-03-10 PROCEDURE — 72125 CT NECK SPINE W/O DYE: CPT

## 2019-03-10 PROCEDURE — 82962 GLUCOSE BLOOD TEST: CPT

## 2019-03-10 PROCEDURE — 70450 CT HEAD/BRAIN W/O DYE: CPT

## 2019-03-10 PROCEDURE — 99284 EMERGENCY DEPT VISIT MOD MDM: CPT | Mod: 25

## 2019-03-10 NOTE — ED TRIAGE NOTES
"Presents complaining of headache, and neck ache after a GLF earlier today.  She experienced a possible syncopal episode consequent to hypoglycemia.  She seen an endocrinologist who follows her diabetic needs.  She states experiencing these episodes frequently, as she \"adjusts\" her insulin dose.   Chief Complaint   Patient presents with   • T-5000 FALL   • Headache   • Diabetes     /89   Pulse 96   Temp 36.6 °C (97.8 °F) (Temporal)   Resp 18   Ht 1.676 m (5' 6\")   Wt 70 kg (154 lb 5.2 oz)   LMP 04/29/2005 (LMP Unknown)   SpO2 99%   BMI 24.91 kg/m²     .  "

## 2019-03-10 NOTE — ED NOTES
Med rec updated and complete  Allergies reviewed  Interviewed pt with  at bedside with permission from pt.  Pt reports no antibiotics in the last 30 days.    '

## 2019-03-10 NOTE — ED PROVIDER NOTES
"ED Provider Note    CHIEF COMPLAINT  Chief Complaint   Patient presents with   • T-5000 FALL   • Headache   • Diabetes       HPI  Anu Manuel is a 63 y.o. female who presents To the emergency department after a fall.  The patient had a hypoglycemic episode this morning.  She was somewhat confused and stumbling.  The patient fell.   found her and noted the symptoms.  He gave her some glucose.  The patient's blood sugar increased and her mental status normalized.  She is been working with her endocrinologist to adjust her medications appropriately.  She is currently under their care.  She is been monitoring this closely.  This is been somewhat of an issue.  She says she is feeling better now.  She does have a mild headache and she has some right-sided neck pain.  No numbness, tingling, or weakness.    REVIEW OF SYSTEMS  See HPI for further details. All other systems are negative.     PAST MEDICAL HISTORY  Past Medical History:   Diagnosis Date   • Anesthesia     in 2008 \"throat closes up\"\"anxiety\" ?laryngospasm, kept in ICU. Pt states no problems currently 2018.    • Arthritis     right shoulder, hands   • Cigarette smoker quit 2013   • depression 8/30/2016    denies depression, states has anxiety and panic attacks   • Diabetes mellitus type 1 (HCC) 1989    insulin   • Encounter for long-term (current) use of insulin (HCC) 9/25/2013   • Hypothyroidism, postsurgical 1970   • Infectious disease     hepatitis C, tested neg.   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.63) 6/10/2015       FAMILY HISTORY  Family History   Problem Relation Age of Onset   • Hypertension Mother    • Cancer Father        SOCIAL HISTORY  Social History     Social History   • Marital status:      Spouse name: " N/A   • Number of children: N/A   • Years of education: N/A     Social History Main Topics   • Smoking status: Current Every Day Smoker     Packs/day: 0.50     Years: 30.00     Last attempt to quit: 9/8/2013   • Smokeless tobacco: Current User      Comment: 1 ppd    • Alcohol use No      Comment:     • Drug use: No   • Sexual activity: Not on file     Other Topics Concern   • Not on file     Social History Narrative   • No narrative on file       SURGICAL HISTORY  Past Surgical History:   Procedure Laterality Date   • SPINAL CORD STIMULATOR N/A 10/26/2018    Procedure: SPINAL CORD STIMULATOR;  Surgeon: Ryan Roman M.D.;  Location: [facility];  Service: Neurosurgery   • THORACIC LAMINECTOMY N/A 10/26/2018    Procedure: THORACIC LAMINECTOMY - FOR;  Surgeon: Ryan Roman M.D.;  Location: [facility];  Service: Neurosurgery   • PB INJ,FORAMEN,L/S,1 LEVEL Right 8/31/2016    Procedure: INJ-FORAMEN EPI LUM/SAC SNGL L4-5;  Surgeon: Sukhi Godfrey M.D.;  Location: [facility];  Service: Pain Management   • LUMBAR LAMINECTOMY DISKECTOMY Right 5/10/2016    Procedure: LUMBAR L4-5 HEMILAMINECTOMY DISKECTOMY ;  Surgeon: Arnold Keyes M.D.;  Location: [facility];  Service:    • CERVICAL FUSION POSTERIOR  1/16/2009    Performed by TARA CONTRERAS at [facility]   • HARDWARE REMOVAL NEURO  1/16/2009    Performed by TARA CONTRERAS at [facility]   • NECK EXPLORATION  1/16/2009    Performed by TARA CONTRERAS at [facility]   • SHOULDER ARTHROSCOPY W/ ROTATOR CUFF REPAIR  10/9/08    Performed by SHERLY CASTANEDA at [facility]   • SHOULDER DECOMPRESSION ARTHROSCOPIC  6/17/08    Performed by SHERLY CASTANEDA at [facility]   • CLAVICLE DISTAL EXCISION  6/17/08    Performed by SHERLY CASTANEDA at [facility]   • CERVICAL DISK AND FUSION ANTERIOR  03/12/08   • HYSTERECTOMY, VAGINAL  2006   • APPENDECTOMY  2004  "  • THYROID LOBECTOMY  1973   • TONSILLECTOMY  1963   • ABDOMINAL HYSTERECTOMY TOTAL     • LUMPECTOMY  1976, 2005    Breast    • LUMPECTOMY         CURRENT MEDICATIONS  Home Medications     Reviewed by Stone Brady (Pharmacy Tech) on 03/10/19 at 1539  Med List Status: Complete   Medication Last Dose Status   Calcium Carbonate-Vitamin D (CALCIUM 600/VITAMIN D PO) 3/9/2019 Active   Insulin Degludec (TRESIBA FLEXTOUCH) 200 UNIT/ML Solution Pen-injector 3/9/2019 Active   levothyroxine (SYNTHROID) 175 MCG Tab 3/9/2019 Active   lisinopril (PRINIVIL) 10 MG Tab 3/9/2019 Active   NOVOLOG, insulin aspart, (NOVOLOG FLEXPEN) 100 UNIT/ML Solution Pen-injector injection 3/10/2019 Active                ALLERGIES  Allergies   Allergen Reactions   • Ativan Unspecified      Extreme Restless leg  MLM=6790       PHYSICAL EXAM  VITAL SIGNS: /89   Pulse 96   Temp 36.6 °C (97.8 °F) (Temporal)   Resp 18   Ht 1.676 m (5' 6\")   Wt 70 kg (154 lb 5.2 oz)   LMP 04/29/2005 (LMP Unknown)   SpO2 99%   BMI 24.91 kg/m²   Constitutional:  Well developed, Well nourished, No acute distress, Non-toxic appearance.   HENT: Tenderness to palpation of the right occiput.  Mild tenderness extending down into the right cervical paraspinal musculature.  No specific point tenderness to palpation.  Eyes: PERRLA, EOMI, Conjunctiva normal, No discharge.   Neck: Normal range of motion, No tenderness, Supple, No stridor.   Lymphatic: No lymphadenopathy noted.   Cardiovascular: Normal heart rate, Normal rhythm, No murmurs, No rubs, No gallops.   Thorax & Lungs: Normal breath sounds, No respiratory distress, No wheezing, No chest tenderness.   Skin: Warm, Dry, No erythema, No rash.   Extremities: Intact distal pulses, No edema, No tenderness, No cyanosis, No clubbing.   Neurologic: Alert & oriented x 3, Normal motor function, Normal sensory function, No focal deficits noted.   Psychiatric: Affect normal, Judgment normal, Mood normal. "     RADIOLOGY/PROCEDURES  CT-CSPINE WITHOUT PLUS RECONS   Final Result      Extensive postsurgical change which limits evaluation of the bony detail.      Progression of disc space narrowing with discogenic endplate change at C7-T1 with persistent mild anterolisthesis.      CT-HEAD W/O   Final Result      No acute intracranial findings.               COURSE & MEDICAL DECISION MAKING  Pertinent Labs & Imaging studies reviewed. (See chart for details)    Patient presents today after a fall.  This occurred during a hypoglycemic episode.  Blood sugar was checked here and is now over 200.  Patient says that she is feeling better from that standpoint.  She does have tenderness on the right posterior aspect of the scalp.  She has some neck tenderness.  CT scan of the head and neck are obtained.  There is no evidence of acute traumatic injury.  Patient is discharged home in stable condition.  She is going to continue closely monitoring her blood sugars.    The patient will return for new or worsening symptoms and is stable at the time of discharge.    The patient is referred to a primary physician for blood pressure management, diabetic screening, and for all other preventative health concerns.    DISPOSITION:  Patient will be discharged home in stable condition.    FOLLOW UP:  Jarrell Yates M.D.  [address] [facility]  [address]  [place] [address]  [phone]    Schedule an appointment as soon as possible for a visit       [facility], Emergency Dept  [address]  [facility] [id number] [phone]    If symptoms worsen      OUTPATIENT MEDICATIONS:  New Prescriptions    No medications on file         FINAL IMPRESSION  1. Fall, initial encounter    2. Blunt head trauma, initial encounter    3. Hypoglycemia    4. Strain of neck muscle, initial encounter            Electronically signed by: Erlin Mendoza, 3/10/2019 4:02 PM

## 2019-03-18 ENCOUNTER — TELEPHONE (OUTPATIENT)
Dept: ENDOCRINOLOGY | Facility: MEDICAL CENTER | Age: 64
End: 2019-03-18

## 2019-03-18 NOTE — TELEPHONE ENCOUNTER
1. Caller Name: Anu                                         Call Back Number: [phone] (home)         Patient approves a detailed voicemail message: no    Patient called saying that she is having somethings going on that she is worried about. Things it might have something to do with the Tresiba. Blood sugars have been low in the mornings the other day 3/10 patient stood up, became lightheaded, fell and hit her head causing her a visit to the ER. She also feels like she is swollen, bloated, has a rash on her forearm and feels like she has vertigo. She is seeing urgent care this evening for an upper respiratory problem. Told her to get the rash looked at as well. Moved her apt to tomorrow at 2:15p with [facility].   She is currently taking 18 units of Tresiba at night.

## 2019-03-22 ENCOUNTER — OFFICE VISIT (OUTPATIENT)
Dept: ENDOCRINOLOGY | Facility: MEDICAL CENTER | Age: 64
End: 2019-03-22
Payer: MEDICARE

## 2019-03-22 VITALS
SYSTOLIC BLOOD PRESSURE: 108 MMHG | BODY MASS INDEX: 25.01 KG/M2 | DIASTOLIC BLOOD PRESSURE: 68 MMHG | HEART RATE: 98 BPM | WEIGHT: 155.6 LBS | HEIGHT: 66 IN | OXYGEN SATURATION: 98 %

## 2019-03-22 DIAGNOSIS — Z79.4 ENCOUNTER FOR LONG-TERM (CURRENT) USE OF INSULIN (HCC): ICD-10-CM

## 2019-03-22 PROCEDURE — 99214 OFFICE O/P EST MOD 30 MIN: CPT | Performed by: PHYSICIAN ASSISTANT

## 2019-03-22 RX ORDER — BLOOD-GLUCOSE METER
KIT MISCELLANEOUS
Qty: 150 STRIP | Refills: 4 | Status: SHIPPED | OUTPATIENT
Start: 2019-03-22 | End: 2019-03-30 | Stop reason: CLARIF

## 2019-03-22 RX ORDER — BLOOD-GLUCOSE METER
KIT MISCELLANEOUS
Refills: 1 | COMMUNITY
Start: 2019-01-28 | End: 2019-03-22 | Stop reason: SDUPTHER

## 2019-03-22 RX ORDER — HYDROCODONE BITARTRATE AND ACETAMINOPHEN 10; 325 MG/15ML; MG/15ML
SOLUTION ORAL
COMMUNITY
Start: 2019-01-03 | End: 2019-03-30 | Stop reason: CLARIF

## 2019-03-22 RX ORDER — HYDROCODONE BITARTRATE AND ACETAMINOPHEN 10; 325 MG/15ML; MG/15ML
SOLUTION ORAL
Refills: 0 | COMMUNITY
Start: 2019-01-03 | End: 2019-03-30 | Stop reason: CLARIF

## 2019-03-22 NOTE — PROGRESS NOTES
Return to office Patient Consult Note  Referred by: Jarrell Yates M.D.    Reason for consult: Diabetes Management Type 2    HPI:  Anu Manuel is a 63 y.o. old patient who is seeing us today for diabetes care.  This is a pleasant patient with diabetes and I appreciate the opportunity to participate in the care of this patient.       Labs of 2/12/19 HbA1c is 11.7  Labs of 11/30/18 GFR >60, TSH 14.4, HbA1c is 10.9  Labs of 3/28/18 TSH 11.5    BG Diary:3/22/2019  In the AM:  No log        1. Type 1 diabetes mellitus with neurological manifestations, uncontrolled (HCC)  This is a new patient with me on 1/30/19     They are on:  1.  Novolog   Carb ratio 1:5   Start a 1:50 above 150  150-200 1 units  201 -250 2 units  251 -300 3 units  301 - 350 4 units  351 - 400 5 units  401 - 450 6 units  451 - 500 7 units  501 - 550 8 units  600 - 650 9 units  2.  Tresiba 20 units at night    2. Encounter for long-term (current) use of insulin ([facility])  Is on a high risk medication Insulin and we will continue to follow       ROS:   Constitutional: No night sweats.  Eyes:  No visual changes.  Cardiac: No chest pain, No palpitations or racing heart rate.  Resp: No shortness of breath, No cough,   Gi: No Diarrhea    All other systems were reviewed and were/are negative.  The ROS was revised/revisited during this office visit from the patients first office visit with me on 1/30/19 Please review the full ROS during the first office visit.    Past Medical History:  Patient Active Problem List    Diagnosis Date Noted   • Cellulitis 03/15/2018     Priority: High   • Chronic pain of right lower extremity 03/15/2018   • Acute left lumbar radiculopathy 08/31/2016   • Lumbar spinal stenosis 05/10/2016   • Type 1 diabetes mellitus with neurological manifestations, uncontrolled ([facility]) 06/10/2015   • Diabetic polyneuropathy (CMS-[facility]) 06/10/2015   • Vertigo 04/08/2015   • Encounter for long-term (current) use of insulin ([facility]) 09/25/2013   •  Accidental drug overdose 08/27/2012   • Vitamin d deficiency 03/14/2012   • Hypothyroidism, postsurgical    • Diabetes mellitus type 1 (HCC)    • Cigarette smoker        Past Surgical History:  Past Surgical History:   Procedure Laterality Date   • SPINAL CORD STIMULATOR N/A 10/26/2018    Procedure: SPINAL CORD STIMULATOR;  Surgeon: Ryan Roman M.D.;  Location: [facility];  Service: Neurosurgery   • THORACIC LAMINECTOMY N/A 10/26/2018    Procedure: THORACIC LAMINECTOMY - FOR;  Surgeon: Ryan Roman M.D.;  Location: [facility];  Service: Neurosurgery   • PB INJ,FORAMEN,L/S,1 LEVEL Right 8/31/2016    Procedure: INJ-FORAMEN EPI LUM/SAC SNGL L4-5;  Surgeon: Sukhi Godfrey M.D.;  Location[facility];  Service: Pain Management   • LUMBAR LAMINECTOMY DISKECTOMY Right 5/10/2016    Procedure: LUMBAR L4-5 HEMILAMINECTOMY DISKECTOMY ;  Surgeon: Arnold Keyes M.D.;  Location: [facility];  Service:    • CERVICAL FUSION POSTERIOR  1/16/2009    Performed by TARA CONTRERAS at [facility]   • HARDWARE REMOVAL NEURO  1/16/2009    Performed by TARA CONTRERAS at [facility]   • NECK EXPLORATION  1/16/2009    Performed by TARA CONTRERAS at [facility]   • SHOULDER ARTHROSCOPY W/ ROTATOR CUFF REPAIR  10/9/08    Performed by SHERLY CASTANEDA at [facility]   • SHOULDER DECOMPRESSION ARTHROSCOPIC  6/17/08    Performed by SHERLY CASTANEDA at [facility]   • CLAVICLE DISTAL EXCISION  6/17/08    Performed by SHERLY CASTANEDA at [facility]   • CERVICAL DISK AND FUSION ANTERIOR  03/12/08   • HYSTERECTOMY, VAGINAL  2006   • APPENDECTOMY  2004   • THYROID LOBECTOMY  1973   • TONSILLECTOMY  1963   • ABDOMINAL HYSTERECTOMY TOTAL     • LUMPECTOMY  1976, 2005    Breast    • LUMPECTOMY         Allergies:  Ativan    Social History:  Social History     Social History   • Marital status:      Spouse name: N/A   •  Number of children: N/A   • Years of education: N/A     Occupational History   • Not on file.     Social History Main Topics   • Smoking status: Current Every Day Smoker     Packs/day: 0.50     Years: 30.00     Last attempt to quit: 9/8/2013   • Smokeless tobacco: Current User      Comment: 1 ppd    • Alcohol use No      Comment:     • Drug use: No   • Sexual activity: Not on file     Other Topics Concern   • Not on file     Social History Narrative   • No narrative on file       Family History:  Family History   Problem Relation Age of Onset   • Hypertension Mother    • Cancer Father        Medications:    Current Outpatient Prescriptions:   •  Glucagon, rDNA, 1 MG Kit, 1 mg by Injection route Once PRN (use for severe Hypoglycemia only) for up to 1 dose., Disp: 2 Kit, Rfl: 3  •  FREESTYLE LITE strip, USE 1 STRIP 6 TIMES A DAY, Disp: 150 Strip, Rfl: 4  •  lisinopril (PRINIVIL) 10 MG Tab, Take 10 mg by mouth every evening. TAKE 1 TABLET BY MOUTH DAILY, Disp: , Rfl: 0  •  NOVOLOG, insulin aspart, (NOVOLOG FLEXPEN) 100 UNIT/ML Solution Pen-injector injection, Inject 1-5 Units as instructed 3 times a day before meals. Blood sugar 151-200 = 1 unit 201-250 = 2 units 251-300 = 3 units 301-350 = 4 units 351-400 = 5 units Carb count 1 unit/5 carbs, Disp: , Rfl:   •  Calcium Carbonate-Vitamin D (CALCIUM 600/VITAMIN D PO), Take 1 Tab by mouth every bedtime., Disp: , Rfl:   •  levothyroxine (SYNTHROID) 175 MCG Tab, Take 175 mcg by mouth Every morning on an empty stomach., Disp: , Rfl:   •  Hydrocodone-Acetaminophen  MG/15ML Solution, , Disp: , Rfl:   •  Hydrocodone-Acetaminophen  MG/15ML Solution, TAKE 1 TABLET BY MOUTH THREE TIMES A DAY AS NEEDED FOR 30 DAYS M51, Disp: , Rfl: 0  •  Insulin Degludec (TRESIBA FLEXTOUCH) 200 UNIT/ML Solution Pen-injector, Inject 50 Units as instructed every bedtime., Disp: 3 PEN, Rfl: 2        Physical Examination:   Vital signs: /68 (BP Location: Left arm, Patient Position:  "Sitting, BP Cuff Size: Adult)   Pulse 98   Ht 1.676 m (5' 5.98\")   Wt 70.6 kg (155 lb 9.6 oz)   LMP 04/29/2005 (LMP Unknown)   SpO2 98%   BMI 25.13 kg/m²   General: No distress, cooperative, well dressed and well nourished.   Eyes: No scleral icterus or discharge, No hyposphagma  ENMT: Normal on external inspection of nose, lips, No nasal drainage   Neck: No abnormal masses on inspection  Resp: Normal effort, Bilateral clear to auscultation, No wheezing, No rales  CVS: Regular rate and rhythm, S1 S2 normal, No murmur. No gallop  Extremities: No edema bilateral extremities  Neuro: Alert and oriented  Skin: No rash, No Ulcers  Psych: Normal mood and affect      Assessment and Plan:    1. Type 1 diabetes mellitus with neurological manifestations, uncontrolled (HCC)    *They are on:  1.  Novolog   Carb ratio 1:5   Start a 1:50 above 150  150-200 1 units  201 -250 2 units  251 -300 3 units  301 - 350 4 units  351 - 400 5 units  401 - 450 6 units  451 - 500 7 units  501 - 550 8 units  600 - 650 9 units  2.  Tresiba 20 units at night (INCREASE to 22)    2. Encounter for long-term (current) use of insulin (HCC)  Is on a high risk medication Insulin and we will continue to follow     Return in about 2 months (around 5/22/2019).      Thank you kindly for allowing me to participate in the diabetes care plan for this patient.    Pantera Mobley PA-C, BC-ADM  Board Certified - Advanced Diabetes [facility]  03/22/19    CC:   Jarrell Yates M.D.    "

## 2019-03-30 ENCOUNTER — HOSPITAL ENCOUNTER (EMERGENCY)
Facility: MEDICAL CENTER | Age: 64
End: 2019-03-30
Attending: EMERGENCY MEDICINE
Payer: MEDICARE

## 2019-03-30 VITALS
WEIGHT: 154.32 LBS | RESPIRATION RATE: 18 BRPM | HEIGHT: 65 IN | HEART RATE: 86 BPM | OXYGEN SATURATION: 97 % | BODY MASS INDEX: 25.71 KG/M2 | TEMPERATURE: 98.2 F | DIASTOLIC BLOOD PRESSURE: 86 MMHG | SYSTOLIC BLOOD PRESSURE: 140 MMHG

## 2019-03-30 DIAGNOSIS — S06.0X0A CONCUSSION WITHOUT LOSS OF CONSCIOUSNESS, INITIAL ENCOUNTER: ICD-10-CM

## 2019-03-30 PROCEDURE — 99283 EMERGENCY DEPT VISIT LOW MDM: CPT

## 2019-03-30 RX ORDER — HYDROCODONE BITARTRATE AND ACETAMINOPHEN 10; 325 MG/1; MG/1
1 TABLET ORAL EVERY 6 HOURS PRN
Status: SHIPPED | COMMUNITY
End: 2019-03-30 | Stop reason: CLARIF

## 2019-03-30 RX ORDER — ONDANSETRON 4 MG/1
4 TABLET, ORALLY DISINTEGRATING ORAL ONCE
Qty: 10 TAB | Refills: 0 | Status: SHIPPED | OUTPATIENT
Start: 2019-03-30 | End: 2019-03-30

## 2019-03-30 NOTE — ED TRIAGE NOTES
"This is a 63 y.o. female who was seen in our department the 10 th of this month for evaluation and management of a fall consequent to a hypoglycemic episode.  She was somewhat confused and unsteady on her feet.  She returns complaining of transitory headache, and episodic N/V.   Chief Complaint   Patient presents with   • N/V   • Headache     /89   Pulse 90   Temp 36.9 °C (98.5 °F) (Temporal)   Resp 18   Ht 1.651 m (5' 5\")   Wt 70 kg (154 lb 5.2 oz)   LMP 04/29/2005 (LMP Unknown)   SpO2 98%   BMI 25.68 kg/m²         "

## 2019-03-30 NOTE — ED NOTES
.Pt D/C to home. VSS. D/C instructions and 1 prescriptions Sent to pharmacy. All questions answered and Pt verbalizes understanding. Pt leaves ED with no acute changes, complaints or concerns. Pt ambulated out with a steady gait and all belongings.

## 2019-03-30 NOTE — ED PROVIDER NOTES
"ED Provider Note    CHIEF COMPLAINT  Chief Complaint   Patient presents with   • N/V   • Headache       HPI  Anu Manuel is a 63 y.o. female who presents complaining of continued intermittent headaches dizziness and vomiting after a head injury she sustained on the 10th of this month.  The patient states that she had a hypoglycemic episode and fell and hit her head on the 10th of this month.  She was seen here at the time and had a headache for which they did a CT of her head and neck.  These were negative for any acute injury.  She does not take blood thinning medications.  She states that when she gets up sometimes she feels nauseous and has headaches.  She denies any vision changes.  She denies any unilateral weakness or numbness.  She denies any diarrhea or constipation fevers or chills.  She has not had any further falls or trauma.    REVIEW OF SYSTEMS  HEENT:  No ear pain, congestion or sore throat   EYES: no discharge redness or vision changes  CARDIAC: no chest pain, palpitations    PULMONARY: no dyspnea, cough or congestion   GI: no vomiting diarrhea or abdominal pain   : no dysuria, back pain or hematuria   Neuro: no weakness, numbness aphasia positive headache  Musculoskeletal: no swelling deformity or pain no joint swelling  Endocrine: no fevers, sweating, weight loss   SKIN: no rash, erythema or contusions     See history of present illness all other systems are negative      PAST MEDICAL HISTORY  Past Medical History:   Diagnosis Date   • depression 8/30/2016    denies depression, states has anxiety and panic attacks   • Polyneuropathy in diabetes(357.2) 6/10/2015   • 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 Pt states no problems currently 2018.    • Arthritis     right shoulder, hands   • Cigarette smoker quit 2013   • Infectious disease     hepatitis C, tested neg.   • Joint replacement     cervical   • Pain     \"fibromyalgia\";lower back, right leg   • Status post appendectomy        FAMILY HISTORY  Family History   Problem Relation Age of Onset   • Hypertension Mother    • Cancer Father        SOCIAL HISTORY  Social History     Social History   • Marital status:      Spouse name: N/A   • Number of children: N/A   • Years of education: N/A     Social History Main Topics   • Smoking status: Current Every Day Smoker     Packs/day: 0.50     Years: 30.00     Last attempt to quit: 9/8/2013   • Smokeless tobacco: Current User      Comment: 1 ppd    • Alcohol use No      Comment:     • Drug use: No   • Sexual activity: Not on file     Other Topics Concern   • Not on file     Social History Narrative   • No narrative on file       SURGICAL HISTORY  Past Surgical History:   Procedure Laterality Date   • SPINAL CORD STIMULATOR N/A 10/26/2018    Procedure: SPINAL CORD STIMULATOR;  Surgeon: Ryan Roman M.D.;  Location: [facility];  Service: Neurosurgery   • THORACIC LAMINECTOMY N/A 10/26/2018    Procedure: THORACIC LAMINECTOMY - FOR;  Surgeon: Ryan Roman M.D.;  Location: [facility];  Service: Neurosurgery   • PB INJ,FORAMEN,L/S,1 LEVEL Right 8/31/2016    Procedure: INJ-FORAMEN EPI LUM/SAC SNGL L4-5;  Surgeon: Sukhi Godfrey M.D.;  Location: [facility];  Service: Pain Management   • LUMBAR LAMINECTOMY DISKECTOMY Right 5/10/2016    Procedure: LUMBAR L4-5 HEMILAMINECTOMY DISKECTOMY ;  Surgeon: Arnold Keyes M.D.;  Location: [facility];  Service:    • CERVICAL FUSION POSTERIOR  1/16/2009    Performed by TARA CONTRERAS at [facility]   • HARDWARE REMOVAL NEURO  1/16/2009    Performed by TARA CONTRERAS at [facility]" [facility] [facility]   • NECK EXPLORATION  1/16/2009    Performed by TARA CONTRERAS at SURGERY [facility] ORS   • SHOULDER ARTHROSCOPY W/ ROTATOR CUFF REPAIR  10/9/08    Performed by SHERLY CASTANEDA at SURGERY [facility] ORS   • SHOULDER DECOMPRESSION ARTHROSCOPIC  6/17/08    Performed by SHERLY CASTANEDA at SURGERY [facility]S   • CLAVICLE DISTAL EXCISION  6/17/08    Performed by SHERLY CASTANEDA at SURGERY [facility]S   • CERVICAL DISK AND FUSION ANTERIOR  03/12/08   • HYSTERECTOMY, VAGINAL  2006   • APPENDECTOMY  2004   • THYROID LOBECTOMY  1973   • TONSILLECTOMY  1963   • ABDOMINAL HYSTERECTOMY TOTAL     • LUMPECTOMY  1976, 2005    Breast    • LUMPECTOMY         CURRENT MEDICATIONS  Home Medications     Reviewed by Stone Muse (Pharmacy Tech) on 03/30/19 at 1300  Med List Status: Complete   Medication Last Dose Status   Calcium Carbonate-Vitamin D (CALCIUM 600/VITAMIN D PO) 1 WEEK AGO Active   Glucagon, rDNA, 1 MG Kit 3/17/2019 Active   Insulin Degludec (TRESIBA FLEXTOUCH) 200 UNIT/ML Solution Pen-injector 3/29/2019 Active   levothyroxine (SYNTHROID) 175 MCG Tab 3/30/2019 Active   lisinopril (PRINIVIL) 10 MG Tab 3/29/2019 Active   NOVOLOG, insulin aspart, (NOVOLOG FLEXPEN) 100 UNIT/ML Solution Pen-injector injection 3/30/2019 Active                ALLERGIES  Allergies   Allergen Reactions   • Ativan Unspecified      Extreme Restless leg  [id number]       PHYSICAL EXAM  VITAL SIGNS: /86   Pulse 92   Temp 36.9 °C (98.5 °F) (Temporal)   Resp 19   Ht 1.651 m (5' 5\")   Wt 70 kg (154 lb 5.2 oz)   LMP 04/29/2005 (LMP Unknown)   SpO2 98%   BMI 25.68 kg/m²       Constitutional:  Well developed, Well nourished, No acute distress, Non-toxic appearance.   HENT: Normocephalic atraumatic  Eyes: PERRLA, EOMI, Conjunctiva normal, No discharge.   Neck: Normal range of motion, No tenderness, Supple, No stridor.   Lymphatic: No lymphadenopathy noted.   Cardiovascular: Normal heart rate, Normal rhythm, No murmurs, No " rubs, No gallops.   Thorax & Lungs: Normal breath sounds, No respiratory distress, No wheezing, No chest tenderness.   Skin: Warm, Dry, No erythema, No rash.   Extremities: Intact distal pulses, No edema, No tenderness, No cyanosis, No clubbing.   Neurologic: Alert & oriented x 3, Normal motor function, Normal sensory function, No focal deficits noted.   Psychiatric: Affect normal, Judgment normal, Mood normal.     RADIOLOGY/PROCEDURES  None    COURSE & MEDICAL DECISION MAKING  Pertinent Labs & Imaging studies reviewed. (See chart for details)  Patient is exhibiting signs of a postconcussive syndrome.  I will discharge her home with Tylenol and Zofran and advised that she rest.  I did give her good concussion instructions.  She should be seen by her primary care physician for recheck later in the week and avoid heavy exertion, screens and mentally taxing activities.  The patient has a normal neurologic exam right now.  She will be discharged home in stable condition.    Jarrell Yates M.D.  [address]  [address]  [facility] [address]  [phone]    Call in 2 days  for recheck    Current Outpatient Prescriptions   Medication Sig Dispense Refill   • ondansetron (ZOFRAN ODT) 4 MG TABLET DISPERSIBLE Take 1 Tab by mouth Once for 1 dose. 10 Tab 0   • Glucagon, rDNA, 1 MG Kit 1 mg by Injection route Once PRN (use for severe Hypoglycemia only) for up to 1 dose. 2 Kit 3   • Insulin Degludec (TRESIBA FLEXTOUCH) 200 UNIT/ML Solution Pen-injector Inject 50 Units as instructed every bedtime. 3 PEN 2   • lisinopril (PRINIVIL) 10 MG Tab Take 10 mg by mouth every evening.  0   • NOVOLOG, insulin aspart, (NOVOLOG FLEXPEN) 100 UNIT/ML Solution Pen-injector injection Inject 1-5 Units as instructed 3 times a day before meals. Blood sugar  151-200 = 1 unit  201-250 = 2 units  251-300 = 3 units  301-350 = 4 units  351-400 = 5 units  Carb count 1 unit/10 carbs     • Calcium Carbonate-Vitamin D (CALCIUM 600/VITAMIN D PO) Take 1 Tab by  mouth every bedtime.     • levothyroxine (SYNTHROID) 175 MCG Tab Take 175 mcg by mouth Every morning on an empty stomach.           FINAL IMPRESSION  1. Concussion without loss of consciousness, initial encounter            Electronically signed by: Iliana Gandhi, 3/30/2019 1:06 PM

## 2019-03-30 NOTE — ED NOTES
Pt presents to the ER with c/o intermittent n/v, intermittent headache, and memory problems since her fall a couple weeks ago. Pt was seen in the ED after falling from a hypoglycemic episode and hitting her head on the end table. Pt f/u with pcp who was unable to help the pt and also her diabetic MD who adjusted pt's insulin. Pt continues to have n/v/headache. Pt is currently a&ox4, denies nausea, and is steady on her feet. VSS.     ERP at the bedside.

## 2019-05-23 ENCOUNTER — OFFICE VISIT (OUTPATIENT)
Dept: ENDOCRINOLOGY | Facility: MEDICAL CENTER | Age: 64
End: 2019-05-23
Payer: MEDICARE

## 2019-05-23 VITALS
OXYGEN SATURATION: 98 % | WEIGHT: 154 LBS | DIASTOLIC BLOOD PRESSURE: 70 MMHG | BODY MASS INDEX: 25.66 KG/M2 | HEART RATE: 100 BPM | SYSTOLIC BLOOD PRESSURE: 120 MMHG | HEIGHT: 65 IN

## 2019-05-23 DIAGNOSIS — Z79.4 ENCOUNTER FOR LONG-TERM (CURRENT) USE OF INSULIN (HCC): ICD-10-CM

## 2019-05-23 LAB
GLUCOSE BLD-MCNC: 70 MG/DL (ref 70–100)
HBA1C MFR BLD: 10.4 % (ref 0–5.6)
INT CON NEG: NEGATIVE
INT CON POS: POSITIVE

## 2019-05-23 PROCEDURE — 99214 OFFICE O/P EST MOD 30 MIN: CPT | Performed by: PHYSICIAN ASSISTANT

## 2019-05-23 PROCEDURE — 83036 HEMOGLOBIN GLYCOSYLATED A1C: CPT | Performed by: PHYSICIAN ASSISTANT

## 2019-05-23 PROCEDURE — 82962 GLUCOSE BLOOD TEST: CPT | Performed by: PHYSICIAN ASSISTANT

## 2019-05-23 NOTE — PROGRESS NOTES
Return to office Patient Consult Note  Referred by: Jarrell Yates M.D.    Reason for consult: Diabetes Management Type 1    HPI:  Anu Manuel is a 64 y.o. old patient who is seeing us today for diabetes care.  This is a pleasant patient with diabetes and I appreciate the opportunity to participate in the care of this patient.     Labs of 2/12/19 HbA1c is 11.7  Labs of 11/30/18 GFR >60, TSH 14.4, HbA1c is 10.9  Labs of 3/28/18 TSH 11.5    BG Diary:5/23/2019  In the AM: no log    She quit smoking 2 weeks ago      1. Type 1 diabetes mellitus with neurological manifestations, uncontrolled (HCC)  This is a new patient with me on 1/30/19     They are on:  1.  Novolog   Carb ratio 1:5   Start a 1:50 above 150  150-200 1 units  201 -250 2 units  251 -300 3 units  301 - 350 4 units  351 - 400 5 units  401 - 450 6 units  451 - 500 7 units  501 - 550 8 units  600 - 650 9 units  2.  Tresiba 22 units at night     2. Encounter for long-term (current) use of insulin ([facility])  Is on a high risk medication Insulin and we will continue to follow           ROS:   Constitutional: No night sweats.  Eyes:  No visual changes.  Cardiac: No chest pain, No palpitations or racing heart rate.  Resp: No shortness of breath, No cough,   Gi: No Diarrhea    All other systems were reviewed and were/are negative.      Past Medical History:  Patient Active Problem List    Diagnosis Date Noted   • Cellulitis 03/15/2018     Priority: High   • Chronic pain of right lower extremity 03/15/2018   • Acute left lumbar radiculopathy 08/31/2016   • Lumbar spinal stenosis 05/10/2016   • Type 1 diabetes mellitus with neurological manifestations, uncontrolled ([facility]) 06/10/2015   • Diabetic polyneuropathy (CMS-[facility]) 06/10/2015   • Vertigo 04/08/2015   • Encounter for long-term (current) use of insulin ([facility]) 09/25/2013   • Accidental drug overdose 08/27/2012   • Vitamin d deficiency 03/14/2012   • Hypothyroidism, postsurgical    • Diabetes mellitus type 1  ([facility])    • Cigarette smoker        Past Surgical History:  Past Surgical History:   Procedure Laterality Date   • SPINAL CORD STIMULATOR N/A 10/26/2018    Procedure: SPINAL CORD STIMULATOR;  Surgeon: Ryan Roman M.D.;  Location: SURGERY [facility];  Service: Neurosurgery   • THORACIC LAMINECTOMY N/A 10/26/2018    Procedure: THORACIC LAMINECTOMY - FOR;  Surgeon: Ryan Roman M.D.;  Location: SURGERY [facility];  Service: Neurosurgery   • PB INJ,FORAMEN,L/S,1 LEVEL Right 8/31/2016    Procedure: INJ-FORAMEN EPI LUM/SAC SNGL L4-5;  Surgeon: Sukhi Godfrey M.D.;  Location: SURGERY [facility];  Service: Pain Management   • LUMBAR LAMINECTOMY DISKECTOMY Right 5/10/2016    Procedure: LUMBAR L4-5 HEMILAMINECTOMY DISKECTOMY ;  Surgeon: Arnold Keyes M.D.;  Location: SURGERY [facility];  Service:    • CERVICAL FUSION POSTERIOR  1/16/2009    Performed by TARA CONTRERAS at [facility]   • HARDWARE REMOVAL NEURO  1/16/2009    Performed by TARA CONTRERAS at [facility]   • NECK EXPLORATION  1/16/2009    Performed by TARA CONTRERAS at [facility]   • SHOULDER ARTHROSCOPY W/ ROTATOR CUFF REPAIR  10/9/08    Performed by SHERLY CASTANEDA at [facility]   • SHOULDER DECOMPRESSION ARTHROSCOPIC  6/17/08    Performed by SHERLY CASTANEDA at [facility]   • CLAVICLE DISTAL EXCISION  6/17/08    Performed by SHERLY CASTANEDA at [facility]   • CERVICAL DISK AND FUSION ANTERIOR  03/12/08   • HYSTERECTOMY, VAGINAL  2006   • APPENDECTOMY  2004   • THYROID LOBECTOMY  1973   • TONSILLECTOMY  1963   • ABDOMINAL HYSTERECTOMY TOTAL     • LUMPECTOMY  1976, 2005    Breast    • LUMPECTOMY         Allergies:  Ativan    Social History:  Social History     Social History   • Marital status:      Spouse name: N/A   • Number of children: N/A   • Years of education: N/A     Occupational History   • Not on file.     Social History Main Topics   •  "Smoking status: Current Every Day Smoker     Packs/day: 0.50     Years: 30.00     Last attempt to quit: 9/8/2013   • Smokeless tobacco: Current User      Comment: 1 ppd    • Alcohol use No      Comment:     • Drug use: No   • Sexual activity: Not on file     Other Topics Concern   • Not on file     Social History Narrative   • No narrative on file       Family History:  Family History   Problem Relation Age of Onset   • Hypertension Mother    • Cancer Father        Medications:    Current Outpatient Prescriptions:   •  NOVOLOG, insulin aspart, (NOVOLOG FLEXPEN) 100 UNIT/ML Solution Pen-injector injection, Inject 20 Units as instructed 3 times a day before meals., Disp: 5 PEN, Rfl: 11  •  Glucagon, rDNA, 1 MG Kit, 1 mg by Injection route Once PRN (use for severe Hypoglycemia only) for up to 1 dose., Disp: 2 Kit, Rfl: 3  •  Insulin Degludec (TRESIBA FLEXTOUCH) 200 UNIT/ML Solution Pen-injector, Inject 50 Units as instructed every bedtime., Disp: 3 PEN, Rfl: 2  •  lisinopril (PRINIVIL) 10 MG Tab, Take 10 mg by mouth every evening., Disp: , Rfl: 0  •  Calcium Carbonate-Vitamin D (CALCIUM 600/VITAMIN D PO), Take 1 Tab by mouth every bedtime., Disp: , Rfl:   •  levothyroxine (SYNTHROID) 175 MCG Tab, Take 175 mcg by mouth Every morning on an empty stomach., Disp: , Rfl:         Physical Examination:   Vital signs: /70 (BP Location: Left arm, Patient Position: Sitting)   Pulse 100   Ht 1.651 m (5' 5\")   Wt 69.9 kg (154 lb)   LMP 04/29/2005 (LMP Unknown)   SpO2 98%   BMI 25.63 kg/m²   General: No distress, cooperative, well dressed and well nourished.   Eyes: No scleral icterus or discharge, No hyposphagma  ENMT: Normal on external inspection of nose, lips, No nasal drainage   Neck: No abnormal masses on inspection  Resp: Normal effort, Bilateral clear to auscultation, No wheezing, No rales  CVS: Regular rate and rhythm, S1 S2 normal, No murmur. No gallop  Extremities: No edema bilateral extremities  Neuro: Alert " and oriented  Skin: No rash, No Ulcers  Psych: Normal mood and affect      Assessment and Plan:    1. Type 1 diabetes mellitus with neurological manifestations, uncontrolled (HCC)     They are on:  1.  Novolog   Carb ratio 1:5   Start a 1:50 above 150  150-200 1 units  201 -250 2 units  251 -300 3 units  301 - 350 4 units  351 - 400 5 units  401 - 450 6 units  451 - 500 7 units  501 - 550 8 units  600 - 650 9 units  2.  Tresiba 20 units at night (INCREASE to 24)       2. Encounter for long-term (current) use of insulin (HCC)  FOR MEDICARE  1.  This patient is checking a blood glucose level through finger sticks more than 4 times a day for the past 60 days and this has been scanned into the chart.  Or has been wearing an CGM continuously. A Dexcom or John  2.  This patient is taking 4-5 injections a day of insulin. Or is on a continuous insulin pump  3.  This patients Average BG is 283 with a HbA1c of 11.7  4.  This patient is going low (Hypoglycemia) on a daily basis.  I am ordering a CGM (continous glucose monitor)    Return in about 6 weeks (around 7/4/2019).      Thank you kindly for allowing me to participate in the diabetes care plan for this patient.    Pantera Mobley PA-C, BC-ADM  Board Certified - Advanced Diabetes Management  05/23/19    CC:   Jarrell Yates M.D.

## 2019-05-23 NOTE — PATIENT INSTRUCTIONS
They are on:  1.  Novolog   Carb ratio 1:5   Start a 1:50 above 150  150-200 1 units  201 -250 2 units  251 -300 3 units  301 - 350 4 units  351 - 400 5 units  401 - 450 6 units  451 - 500 7 units  501 - 550 8 units  600 - 650 9 units  2.  Tresiba 20 units at night (INCREASE to 24)

## 2019-06-11 ENCOUNTER — NON-PROVIDER VISIT (OUTPATIENT)
Dept: HEALTH INFORMATION MANAGEMENT | Facility: MEDICAL CENTER | Age: 64
End: 2019-06-11
Payer: MEDICARE

## 2019-06-11 VITALS — WEIGHT: 155 LBS | HEIGHT: 65 IN | BODY MASS INDEX: 25.83 KG/M2

## 2019-06-11 DIAGNOSIS — Z79.4 ENCOUNTER FOR LONG-TERM (CURRENT) USE OF INSULIN (HCC): ICD-10-CM

## 2019-06-11 PROCEDURE — 97803 MED NUTRITION INDIV SUBSEQ: CPT | Performed by: DIETITIAN, REGISTERED

## 2019-06-11 NOTE — PROGRESS NOTES
"6/11/2019 Referring Provider: Jarrell Yates M.D.       Time in/out:  1:28-2:27pm     Anthropometrics/Objective  Vitals:    06/11/19 1427   Weight: 70.3 kg (155 lb)   Height: 1.651 m (5' 5\")       Body mass index is 25.79 kg/m².      Stated Goal Weight: 150lb  See comprehensive patient history form for further information     Subjective:  -Pt states she has had Type 1 diabetes since age 34   -She has had education but admits she doesn't carb count correctly and doesn't take insulin at all meals as directed   -Eats an inconsistent diet; skips lunch some days   -Snack between meals on candy and sweets as well as some nights after dinner such as ice cream   -Drinks water, diet soda or crystal light   -Has been having low blood sugars in the mornings/ fasting as low as 30s so is taking Tresiba 22 instead of going up to 26 as instructed by Endo (Pantera Mobley) at her last appt.   -Takes Novolog with meals 1:5g (recently decreased from 1:10g) but admits she does not always take any insulin with meals or sometimes waits until after eating to \"see how high her blood sugars go\" . Will sometimes only take her correction insulin dose and not add for carbs consumed.     Diet hx:   B: cereal or toast with jam and butter, or pancakes and syrup  L: none or green salad with blue cheese dressing   D: pork or chicken or meat with veggies and potato or starch   S: ice cream      Nutrition Diagnosis (PES Statement)  · Food/nutrition knowledge deficit related to no previous nutrition education as evidenced by limited nutrition knowledge per discussion with patient     Client history:  Condition(s) associated with a diagnosis or treatment (specify) Type 1 DM     Biochemical data, medical test and procedures  Lab Results   Component Value Date/Time    HBA1C 10.4 (A) 05/23/2019 09:05 AM   @  Lab Results   Component Value Date/Time    POCGLUCOSE 70 05/23/2019 09:20 AM     Lab Results   Component Value Date/Time    CHOLSTRLTOT 168 11/30/2018 " "08:46 AM    LDL 87 11/30/2018 08:46 AM    HDL 61 11/30/2018 08:46 AM    TRIGLYCERIDE 101 11/30/2018 08:46 AM         Nutrition Intervention    Meal and Snack  Recommend a general/healthful diet   With carb counting, using 1:5g insulin to carb ratio per Endocrinologist     Comprehensive Nutrition education Instruction or training leading to in-depth nutrition related knowledge about:  Theraputic diet for Carbohydrate counting - reviewed label reading and use of [email or web address] janett to count carbs. Practiced calculating insulin dose needed for novolog before meals.     Monitoring & Evaluation Plan    1. Identify and count total amount of carbs in each meal   2. Divide total carbs by 5 to get units of Novolog needed for that meal   3. Add that amount to correction dose, using 1:50>150, for total amount of insulin needed at meals; take 15mins before eating   4. Use labels and calorie faith to count carbs   5. Test blood sugars fasting, before meals, and ideally 2 hours after meals as well for next month   6. Call Endocrinologist if continuing to have hypoglycemia >2x per week.   7. Stop eating at night after dinner (not past 9:30pm per pt preference)     Assessment Notes:   Anu has not been compliant with her diabetes management. She does not take insulin correctly and is inconsistent with eating. Today we reviewed carb counting and importance of being more consistent with insulin use and eating regimen. She agrees to work on this and can see the benefit now. After our education session pt feels \"it is starting to click\" and is going to practice carb counting as discussed today. We will meet again in 2-3 weeks to assess knowledge and continue to educate as needed.     Follow up 2 weeks   Christine Grey, MENDEZ, LD, CDE  [phone]      "

## 2019-06-26 ENCOUNTER — NON-PROVIDER VISIT (OUTPATIENT)
Dept: HEALTH INFORMATION MANAGEMENT | Facility: MEDICAL CENTER | Age: 64
End: 2019-06-26
Payer: MEDICARE

## 2019-06-26 DIAGNOSIS — Z79.4 ENCOUNTER FOR LONG-TERM (CURRENT) USE OF INSULIN (HCC): ICD-10-CM

## 2019-06-26 PROCEDURE — 97803 MED NUTRITION INDIV SUBSEQ: CPT | Performed by: DIETITIAN, REGISTERED

## 2019-06-26 NOTE — PROGRESS NOTES
Nutrition Reassess    6/26/2019    Referring Provider:  Pantera Awan PA-C     Time: in/out  8:42-9:15am       Subjective:  -Pt states she was not able to start carb counting since we first met.   -She was out of town and also had been very stressed. She reports getting a steroid shot which has caused worsening hyperglycemia  -FSBS this morning was 263 fasting   -Is not eating consistent diet; skips meals   -Still snacking at night before bed and eating candy during day.   -States if she checks FSBS before bed she will give herself a correction dose 1:50>100.       Anthropometrics/Objective  There were no vitals filed for this visit.  There is no height or weight on file to calculate BMI.      BG values: pt has not been checking FSBS regularly. Did not bring her meter today. Fasting this morning was 263     Recommended Diet:   General healthy diet w/ carb counting (1:5g insulin to carb ratio per Endocrinologist)   3 meals; up to 2 low carb snacks   No dessert after dinner     ReAssesment/Notes:  Pt became tearful today regarding her stresses at home with her  and is upset with herself for not following the guidelines we had discussed last appt.   However she is motivated to get back on track now and wants to take control of her blood sugars. She knows she needs to eat more consistent. Needed a review of carb counting today, which we did. Reviewed carb containing foods vs non carb foods, carb counting methods (label, apps etc), and practiced calculating insulin dose at meals. Instructed pt to only give correction dose at meals and to take novolog short acting insulin 15mins before eating her meal (she often waits until after she finishes eating to give insulin). We also reviewed high protein low carb snack ideas to have between meals as needed (recommended <15g carbs per snack, always eaten with protein).     Follow-up: 2 weeks

## 2019-07-25 ENCOUNTER — OFFICE VISIT (OUTPATIENT)
Dept: URGENT CARE | Facility: MEDICAL CENTER | Age: 64
End: 2019-07-25
Payer: MEDICARE

## 2019-07-25 ENCOUNTER — OFFICE VISIT (OUTPATIENT)
Dept: ENDOCRINOLOGY | Facility: MEDICAL CENTER | Age: 64
End: 2019-07-25
Payer: MEDICARE

## 2019-07-25 VITALS
SYSTOLIC BLOOD PRESSURE: 118 MMHG | HEIGHT: 65 IN | HEART RATE: 102 BPM | OXYGEN SATURATION: 96 % | DIASTOLIC BLOOD PRESSURE: 60 MMHG | BODY MASS INDEX: 26.16 KG/M2 | TEMPERATURE: 98 F | WEIGHT: 157 LBS

## 2019-07-25 VITALS
HEART RATE: 100 BPM | OXYGEN SATURATION: 98 % | DIASTOLIC BLOOD PRESSURE: 66 MMHG | SYSTOLIC BLOOD PRESSURE: 120 MMHG | BODY MASS INDEX: 26.16 KG/M2 | HEIGHT: 65 IN | WEIGHT: 157 LBS

## 2019-07-25 DIAGNOSIS — M10.9 ACUTE GOUT OF LEFT ANKLE, UNSPECIFIED CAUSE: ICD-10-CM

## 2019-07-25 DIAGNOSIS — Z79.4 ENCOUNTER FOR LONG-TERM (CURRENT) USE OF INSULIN (HCC): ICD-10-CM

## 2019-07-25 PROCEDURE — 99214 OFFICE O/P EST MOD 30 MIN: CPT | Performed by: PHYSICIAN ASSISTANT

## 2019-07-25 RX ORDER — INDOMETHACIN 50 MG/1
50 CAPSULE ORAL 3 TIMES DAILY
Qty: 90 CAP | Refills: 0 | Status: SHIPPED | OUTPATIENT
Start: 2019-07-25 | End: 2019-10-22

## 2019-07-25 ASSESSMENT — ENCOUNTER SYMPTOMS
FEVER: 0
BLURRED VISION: 0
CHILLS: 0
TINGLING: 0
INABILITY TO BEAR WEIGHT: 0
SHORTNESS OF BREATH: 0
VOMITING: 0
SENSORY CHANGE: 0
LOSS OF SENSATION: 0
SORE THROAT: 0
MUSCLE WEAKNESS: 0
PALPITATIONS: 0
NAUSEA: 0
NUMBNESS: 0
LOSS OF MOTION: 0

## 2019-07-25 NOTE — PROGRESS NOTES
Return to office Patient Consult Note  Referred by: Jarrell Yates M.D.    Reason for consult: Diabetes Management Type 2    HPI:  Anu Manuel is a 64 y.o. old patient who is seeing us today for diabetes care.  This is a pleasant patient with diabetes and I appreciate the opportunity to participate in the care of this patient.    Labs of  5/23/2019 HbA1c is 190.4   Labs of 2/12/19 HbA1c is 11.7  Labs of 11/30/18 GFR >60, TSH 14.4, HbA1c is 10.9  Labs of 3/28/18 TSH 11.5    BG Diary:7/25/2019  In the AM:  No log      1. Type 1 diabetes mellitus with neurological manifestations, uncontrolled (HCC)  This is a new patient with me on 1/30/19     They are on:  1.  Novolog   Carb ratio 1:5   Start a 1:50 above 150  150-200 1 units  201 -250 2 units  251 -300 3 units  301 - 350 4 units  351 - 400 5 units  401 - 450 6 units  451 - 500 7 units  501 - 550 8 units  600 - 650 9 units  2.  Tresiba 24 units at night    2. Encounter for long-term (current) use of insulin ([facility])  Is on a high risk medication Insulin and we will continue to follow       ROS:   Constitutional: No night sweats.  Eyes:  No visual changes.  Cardiac: No chest pain, No palpitations or racing heart rate.  Resp: No shortness of breath, No cough,   Gi: No Diarrhea    All other systems were reviewed and were/are negative.      Past Medical History:  Patient Active Problem List    Diagnosis Date Noted   • Cellulitis 03/15/2018     Priority: High   • Chronic pain of right lower extremity 03/15/2018   • Acute left lumbar radiculopathy 08/31/2016   • Lumbar spinal stenosis 05/10/2016   • Type 1 diabetes mellitus with neurological manifestations, uncontrolled ([facility]) 06/10/2015   • Diabetic polyneuropathy (CMS-[facility]) 06/10/2015   • Vertigo 04/08/2015   • Encounter for long-term (current) use of insulin ([facility]) 09/25/2013   • Accidental drug overdose 08/27/2012   • Vitamin d deficiency 03/14/2012   • Hypothyroidism, postsurgical    • Diabetes mellitus type 1  ([facility])    • Cigarette smoker        Past Surgical History:  Past Surgical History:   Procedure Laterality Date   • SPINAL CORD STIMULATOR N/A 10/26/2018    Procedure: SPINAL CORD STIMULATOR;  Surgeon: Ryan Roman M.D.;  Location: SURGERY [facility];  Service: Neurosurgery   • THORACIC LAMINECTOMY N/A 10/26/2018    Procedure: THORACIC LAMINECTOMY - FOR;  Surgeon: Ryan Roman M.D.;  Location: SURGERY [facility];  Service: Neurosurgery   • PB INJ,FORAMEN,L/S,1 LEVEL Right 8/31/2016    Procedure: INJ-FORAMEN EPI LUM/SAC SNGL L4-5;  Surgeon: Sukhi Godfrey M.D.;  Location: SURGERY [facility];  Service: Pain Management   • LUMBAR LAMINECTOMY DISKECTOMY Right 5/10/2016    Procedure: LUMBAR L4-5 HEMILAMINECTOMY DISKECTOMY ;  Surgeon: Arnold Keyes M.D.;  Location: SURGERY [facility];  Service:    • CERVICAL FUSION POSTERIOR  1/16/2009    Performed by TARA CONTRERAS at [facility]   • HARDWARE REMOVAL NEURO  1/16/2009    Performed by TARA CONTRERAS at [facility]   • NECK EXPLORATION  1/16/2009    Performed by TARA CONTRERAS at [facility]   • SHOULDER ARTHROSCOPY W/ ROTATOR CUFF REPAIR  10/9/08    Performed by SHERLY CASTANEDA at [facility]   • SHOULDER DECOMPRESSION ARTHROSCOPIC  6/17/08    Performed by SHERLY CASTANEDA at [facility]   • CLAVICLE DISTAL EXCISION  6/17/08    Performed by SHERLY CASTANEDA at [facility]   • CERVICAL DISK AND FUSION ANTERIOR  03/12/08   • HYSTERECTOMY, VAGINAL  2006   • APPENDECTOMY  2004   • THYROID LOBECTOMY  1973   • TONSILLECTOMY  1963   • ABDOMINAL HYSTERECTOMY TOTAL     • LUMPECTOMY  1976, 2005    Breast    • LUMPECTOMY         Allergies:  Ativan    Social History:  Social History     Social History   • Marital status:      Spouse name: N/A   • Number of children: N/A   • Years of education: N/A     Occupational History   • Not on file.     Social History Main Topics   •  "Smoking status: Current Every Day Smoker     Packs/day: 0.50     Years: 30.00     Last attempt to quit: 9/8/2013   • Smokeless tobacco: Current User      Comment: 1 ppd    • Alcohol use No      Comment:     • Drug use: No   • Sexual activity: Not on file     Other Topics Concern   • Not on file     Social History Narrative   • No narrative on file       Family History:  Family History   Problem Relation Age of Onset   • Hypertension Mother    • Cancer Father        Medications:    Current Outpatient Prescriptions:   •  Insulin Degludec (TRESIBA FLEXTOUCH) 200 UNIT/ML Solution Pen-injector, Inject 50 Units as instructed every bedtime., Disp: 3 PEN, Rfl: 2  •  glucose blood (FREESTYLE LITE) strip, 1 Strip by Other route 6 Times a Day., Disp: 150 Strip, Rfl: 11  •  NOVOLOG, insulin aspart, (NOVOLOG FLEXPEN) 100 UNIT/ML Solution Pen-injector injection, Inject 20 Units as instructed 3 times a day before meals., Disp: 5 PEN, Rfl: 11  •  Glucagon, rDNA, 1 MG Kit, 1 mg by Injection route Once PRN (use for severe Hypoglycemia only) for up to 1 dose., Disp: 2 Kit, Rfl: 3  •  lisinopril (PRINIVIL) 10 MG Tab, Take 10 mg by mouth every evening., Disp: , Rfl: 0  •  Calcium Carbonate-Vitamin D (CALCIUM 600/VITAMIN D PO), Take 1 Tab by mouth every bedtime., Disp: , Rfl:   •  levothyroxine (SYNTHROID) 175 MCG Tab, Take 175 mcg by mouth Every morning on an empty stomach., Disp: , Rfl:         Physical Examination:   Vital signs: /66 (BP Location: Left arm, Patient Position: Sitting)   Pulse 100   Ht 1.651 m (5' 5\")   Wt 71.2 kg (157 lb)   LMP 04/29/2005 (LMP Unknown)   SpO2 98%   BMI 26.13 kg/m²   General: No distress, cooperative, well dressed and well nourished.   Eyes: No scleral icterus or discharge, No hyposphagma  ENMT: Normal on external inspection of nose, lips, No nasal drainage   Neck: No abnormal masses on inspection  Resp: Normal effort, Bilateral clear to auscultation, No wheezing, No rales  CVS: Regular rate " and rhythm, S1 S2 normal, No murmur. No gallop  Extremities: No edema bilateral extremities  Neuro: Alert and oriented  Skin: No rash, No Ulcers  Psych: Normal mood and affect      Assessment and Plan:    1. Type 1 diabetes mellitus with neurological manifestations, uncontrolled (HCC)  They are on:  1.  Novolog   Carb ratio 1:8  Start a 1:50 above 100  150-200 1 units  201 -250 2 units  251 -300 3 units  301 - 350 4 units  351 - 400 5 units  401 - 450 6 units  451 - 500 7 units  501 - 550 8 units  600 - 650 9 units  2.  Tresiba 24 units at night    2. Encounter for long-term (current) use of insulin (HCC)  Is on a high risk medication Insulin and we will continue to follow       FOR MEDICARE  1.  This patient is checking a blood glucose level through finger sticks more than 4 times a day for the past 60 days and this has been scanned into the chart.  Or has been wearing an CGM continuously. A Dexcom or John  2.  This patient is taking 4-5 injections a day of insulin. Or is on a continuous insulin pump  3.  This patients Average BG is 283 with a HbA1c of 11.7  4.  This patient is going low (Hypoglycemia) on a daily basis.  I am ordering a CGM (continous glucose monitor)    Return in about 3 months (around 10/25/2019).      Thank you kindly for allowing me to participate in the diabetes care plan for this patient.    Pantera Mobley PA-C, BC-ADM  Board Certified - Advanced Diabetes Management  07/25/19    CC:   Jarrell Yates M.D.

## 2019-07-25 NOTE — PATIENT INSTRUCTIONS
Gout  Gout is painful swelling that can occur in some of your joints. Gout is a type of arthritis. This condition is caused by having too much uric acid in your body. Uric acid is a chemical that forms when your body breaks down substances called purines. Purines are important for building body proteins.  When your body has too much uric acid, sharp crystals can form and build up inside your joints. This causes pain and swelling. Gout attacks can happen quickly and be very painful (acute gout). Over time, the attacks can affect more joints and become more frequent (chronic gout). Gout can also cause uric acid to build up under your skin and inside your kidneys.  What are the causes?  This condition is caused by too much uric acid in your blood. This can occur because:  · Your kidneys do not remove enough uric acid from your blood. This is the most common cause.  · Your body makes too much uric acid. This can occur with some cancers and cancer treatments. It can also occur if your body is breaking down too many red blood cells (hemolytic anemia).  · You eat too many foods that are high in purines. These foods include organ meats and some seafood. Alcohol, especially beer, is also high in purines.  A gout attack may be triggered by trauma or stress.  What increases the risk?  This condition is more likely to develop in people who:  · Have a family history of gout.  · Are male and middle-aged.  · Are female and have gone through menopause.  · Are obese.  · Frequently drink alcohol, especially beer.  · Are dehydrated.  · Lose weight too quickly.  · Have an organ transplant.  · Have lead poisoning.  · Take certain medicines, including aspirin, cyclosporine, diuretics, levodopa, and niacin.  · Have kidney disease or psoriasis.  What are the signs or symptoms?  An attack of acute gout happens quickly. It usually occurs in just one joint. The most common place is the big toe. Attacks often start at night. Other joints that  may be affected include joints of the feet, ankle, knee, fingers, wrist, or elbow. Symptoms may include:  · Severe pain.  · Warmth.  · Swelling.  · Stiffness.  · Tenderness. The affected joint may be very painful to touch.  · Shiny, red, or purple skin.  · Chills and fever.  Chronic gout may cause symptoms more frequently. More joints may be involved. You may also have white or yellow lumps (tophi) on your hands or feet or in other areas near your joints.  How is this diagnosed?  This condition is diagnosed based on your symptoms, medical history, and physical exam. You may have tests, such as:  · Blood tests to measure uric acid levels.  · Removal of joint fluid with a needle (aspiration) to look for uric acid crystals.  · X-rays to look for joint damage.  How is this treated?  Treatment for this condition has two phases: treating an acute attack and preventing future attacks. Acute gout treatment may include medicines to reduce pain and swelling, including:  · NSAIDs.  · Steroids. These are strong anti-inflammatory medicines that can be taken by mouth (orally) or injected into a joint.  · Colchicine. This medicine relieves pain and swelling when it is taken soon after an attack. It can be given orally or through an IV tube.  Preventive treatment may include:  · Daily use of smaller doses of NSAIDs or colchicine.  · Use of a medicine that reduces uric acid levels in your blood.  · Changes to your diet. You may need to see a specialist about healthy eating (dietitian).  Follow these instructions at home:  During a Gout Attack  · If directed, apply ice to the affected area:  ¨ Put ice in a plastic bag.  ¨ Place a towel between your skin and the bag.  ¨ Leave the ice on for 20 minutes, 2-3 times a day.  · Rest the joint as much as possible. If the affected joint is in your leg, you may be given crutches to use.  · Raise (elevate) the affected joint above the level of your heart as often as possible.  · Drink enough  fluids to keep your urine clear or pale yellow.  · Take over-the-counter and prescription medicines only as told by your health care provider.  · Do not drive or operate heavy machinery while taking prescription pain medicine.  · Follow instructions from your health care provider about eating or drinking restrictions.  · Return to your normal activities as told by your health care provider. Ask your health care provider what activities are safe for you.  Avoiding Future Gout Attacks  · Follow a low-purine diet as told by your dietitian or health care provider. Avoid foods and drinks that are high in purines, including liver, kidney, anchovies, asparagus, herring, mushrooms, mussels, and beer.  · Limit alcohol intake to no more than 1 drink a day for nonpregnant women and 2 drinks a day for men. One drink equals 12 oz of beer, 5 oz of wine, or 1½ oz of hard liquor.  · Maintain a healthy weight or lose weight if you are overweight. If you want to lose weight, talk with your health care provider. It is important that you do not lose weight too quickly.  · Start or maintain an exercise program as told by your health care provider.  · Drink enough fluids to keep your urine clear or pale yellow.  · Take over-the-counter and prescription medicines only as told by your health care provider.  · Keep all follow-up visits as told by your health care provider. This is important.  Contact a health care provider if:  · You have another gout attack.  · You continue to have symptoms of a gout attack after10 days of treatment.  · You have side effects from your medicines.  · You have chills or a fever.  · You have burning pain when you urinate.  · You have pain in your lower back or belly.  Get help right away if:  · You have severe or uncontrolled pain.  · You cannot urinate.  This information is not intended to replace advice given to you by your health care provider. Make sure you discuss any questions you have with your health  care provider.  Document Released: 12/15/2001 Document Revised: 05/25/2017 Document Reviewed: 09/29/2016  [employer] Interactive Patient Education © 2017 [employer]

## 2019-07-25 NOTE — PATIENT INSTRUCTIONS
They are on:  1.  Novolog   Carb ratio 1:8  Start a 1:50 above 100  150-200 1 units  201 -250 2 units  251 -300 3 units  301 - 350 4 units  351 - 400 5 units  401 - 450 6 units  451 - 500 7 units  501 - 550 8 units  600 - 650 9 units  2.  Tresiba 24 units at night

## 2019-07-25 NOTE — PROGRESS NOTES
Subjective:      Anu Manuel is a 64 y.o. female who presents with Ankle Pain (possible gout in left ankle)      Ankle Pain    The incident occurred 5 to 7 days ago. There was no injury mechanism. The pain is present in the left ankle. The quality of the pain is described as burning and aching. The pain is moderate. The pain has been constant since onset. Pertinent negatives include no inability to bear weight, loss of motion, loss of sensation, muscle weakness, numbness or tingling. The symptoms are aggravated by weight bearing and movement. She has tried nothing for the symptoms.   Patient reports no history of gout but states that she was at her endocrinology office this morning and the endocrinologist evaluated and told her it was likely gout and for her to come to urgent care for evaluation.    Review of Systems   Constitutional: Negative for chills and fever.   HENT: Negative for sore throat.    Eyes: Negative for blurred vision.   Respiratory: Negative for shortness of breath.    Cardiovascular: Negative for chest pain and palpitations.   Gastrointestinal: Negative for nausea and vomiting.   Musculoskeletal: Positive for joint pain (Left ankle).   Neurological: Negative for tingling, sensory change and numbness.       PMH:  has a past medical history of Anesthesia; Arthritis; Cigarette smoker (quit 2013); depression (8/30/2016); Diabetes mellitus type 1 ([facility]) (1989); Encounter for long-term (current) use of insulin ([facility]) (9/25/2013); Hypothyroidism, postsurgical (1970); Infectious disease; Joint replacement; Pain; Polyneuropathy in diabetes(357.2) (6/10/2015); Status post appendectomy; and 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  MEDS:   Current Outpatient Prescriptions:   •  indomethacin (INDOCIN) 50 MG Cap, Take 1 Cap by mouth 3 times a day., Disp: 90 Cap, Rfl: 0  •  Insulin Degludec (TRESIBA FLEXTOUCH) 200 UNIT/ML Solution  Pen-injector, Inject 50 Units as instructed every bedtime., Disp: 3 PEN, Rfl: 2  •  glucose blood (FREESTYLE LITE) strip, 1 Strip by Other route 6 Times a Day., Disp: 150 Strip, Rfl: 11  •  NOVOLOG, insulin aspart, (NOVOLOG FLEXPEN) 100 UNIT/ML Solution Pen-injector injection, Inject 20 Units as instructed 3 times a day before meals., Disp: 5 PEN, Rfl: 11  •  Glucagon, rDNA, 1 MG Kit, 1 mg by Injection route Once PRN (use for severe Hypoglycemia only) for up to 1 dose., Disp: 2 Kit, Rfl: 3  •  lisinopril (PRINIVIL) 10 MG Tab, Take 10 mg by mouth every evening., Disp: , Rfl: 0  •  Calcium Carbonate-Vitamin D (CALCIUM 600/VITAMIN D PO), Take 1 Tab by mouth every bedtime., Disp: , Rfl:   •  levothyroxine (SYNTHROID) 175 MCG Tab, Take 175 mcg by mouth Every morning on an empty stomach., Disp: , Rfl:   ALLERGIES:   Allergies   Allergen Reactions   • Ativan Unspecified      Extreme Restless leg  [id number]     SURGHX:   Past Surgical History:   Procedure Laterality Date   • SPINAL CORD STIMULATOR N/A 10/26/2018    Procedure: SPINAL CORD STIMULATOR;  Surgeon: Ryan Roman M.D.;  Location: [facility];  Service: Neurosurgery   • THORACIC LAMINECTOMY N/A 10/26/2018    Procedure: THORACIC LAMINECTOMY - FOR;  Surgeon: Ryan Roman M.D.;  Location: [facility];  Service: Neurosurgery   • PB INJ,FORAMEN,L/S,1 LEVEL Right 8/31/2016    Procedure: INJ-FORAMEN EPI LUM/SAC SNGL L4-5;  Surgeon: Sukhi Godfrey M.D.;  Location: [facility];  Service: Pain Management   • LUMBAR LAMINECTOMY DISKECTOMY Right 5/10/2016    Procedure: LUMBAR L4-5 HEMILAMINECTOMY DISKECTOMY ;  Surgeon: Arnold Keyes M.D.;  Location: [facility];  Service:    • CERVICAL FUSION POSTERIOR  1/16/2009    Performed by TARA CONTRERAS at [facility]   • HARDWARE REMOVAL NEURO  1/16/2009    Performed by TARA CONTRERAS at [facility]   • NECK EXPLORATION  1/16/2009    Performed by  "TARA CONTRERAS at SURGERY [facility]S   • SHOULDER ARTHROSCOPY W/ ROTATOR CUFF REPAIR  10/9/08    Performed by SHERLY CASTANEDA at SURGERY [facility]   • SHOULDER DECOMPRESSION ARTHROSCOPIC  6/17/08    Performed by SHERLY CASTANEDA at SUR[facility]ERY [facility]   • CLAVICLE DISTAL EXCISION  6/17/08    Performed by SHERLY CASTANEDA at SUR[facility]ERY [facility]   • CERVICAL DISK AND FUSION ANTERIOR  03/12/08   • HYSTERECTOMY, VAGINAL  2006   • APPENDECTOMY  2004   • THYROID LOBECTOMY  1973   • TONSILLECTOMY  1963   • ABDOMINAL HYSTERECTOMY TOTAL     • LUMPECTOMY  1976, 2005    Breast    • LUMPECTOMY       SOCHX:  reports that she has been smoking.  She has a 15.00 pack-year smoking history. She uses smokeless tobacco. She reports that she does not drink alcohol or use drugs.  FH: Family history was reviewed, no pertinent findings to report     Objective:     /60   Pulse (!) 102   Temp 36.7 °C (98 °F)   Ht 1.651 m (5' 5\")   Wt 71.2 kg (157 lb)   LMP 04/29/2005 (LMP Unknown)   SpO2 96%   BMI 26.13 kg/m²      Physical Exam   Constitutional: She is oriented to person, place, and time. She appears well-developed and well-nourished.   HENT:   Head: Normocephalic and atraumatic.   Right Ear: External ear normal.   Left Ear: External ear normal.   Eyes: Pupils are equal, round, and reactive to light. Conjunctivae are normal.   Cardiovascular: Normal rate, regular rhythm and normal heart sounds.    No murmur heard.  Pulmonary/Chest: Effort normal and breath sounds normal. She has no wheezes.   Musculoskeletal:        Left ankle: She exhibits normal range of motion. Tenderness.        Feet:    Neurological: She is alert and oriented to person, place, and time.   Skin: Skin is warm and dry. Capillary refill takes less than 2 seconds.   Psychiatric: She has a normal mood and affect. Her behavior is normal. Judgment normal.          Assessment/Plan:     1. Acute gout of left ankle, unspecified cause  - indomethacin  - " Follow-up with PCP early next week          Differential Diagnosis, natural history, and supportive care discussed. Return to the Urgent Care or follow up with your PCP if symptoms fail to resolve, or for any new or worsening symptoms. Emergency room precautions discussed. Patient and/or family appears understanding of information.

## 2019-09-25 ENCOUNTER — HOSPITAL ENCOUNTER (OUTPATIENT)
Dept: LAB | Facility: MEDICAL CENTER | Age: 64
End: 2019-09-25
Attending: NURSE PRACTITIONER
Payer: MEDICARE

## 2019-09-25 LAB
ALBUMIN SERPL BCP-MCNC: 4 G/DL (ref 3.2–4.9)
ALBUMIN/GLOB SERPL: 1.5 G/DL
ALP SERPL-CCNC: 92 U/L (ref 30–99)
ALT SERPL-CCNC: 17 U/L (ref 2–50)
ANION GAP SERPL CALC-SCNC: 6 MMOL/L (ref 0–11.9)
AST SERPL-CCNC: 18 U/L (ref 12–45)
BILIRUB SERPL-MCNC: 0.3 MG/DL (ref 0.1–1.5)
BUN SERPL-MCNC: 19 MG/DL (ref 8–22)
CALCIUM SERPL-MCNC: 8.9 MG/DL (ref 8.5–10.5)
CHLORIDE SERPL-SCNC: 104 MMOL/L (ref 96–112)
CHOLEST SERPL-MCNC: 194 MG/DL (ref 100–199)
CO2 SERPL-SCNC: 28 MMOL/L (ref 20–33)
CREAT SERPL-MCNC: 0.85 MG/DL (ref 0.5–1.4)
FASTING STATUS PATIENT QL REPORTED: NORMAL
GLOBULIN SER CALC-MCNC: 2.6 G/DL (ref 1.9–3.5)
GLUCOSE SERPL-MCNC: 233 MG/DL (ref 65–99)
HDLC SERPL-MCNC: 55 MG/DL
LDLC SERPL CALC-MCNC: 113 MG/DL
POTASSIUM SERPL-SCNC: 3.9 MMOL/L (ref 3.6–5.5)
PROT SERPL-MCNC: 6.6 G/DL (ref 6–8.2)
SODIUM SERPL-SCNC: 138 MMOL/L (ref 135–145)
TRIGL SERPL-MCNC: 128 MG/DL (ref 0–149)
TSH SERPL DL<=0.005 MIU/L-ACNC: 3.07 UIU/ML (ref 0.38–5.33)

## 2019-09-25 PROCEDURE — 36415 COLL VENOUS BLD VENIPUNCTURE: CPT

## 2019-09-25 PROCEDURE — 84443 ASSAY THYROID STIM HORMONE: CPT

## 2019-09-25 PROCEDURE — 80061 LIPID PANEL: CPT

## 2019-09-25 PROCEDURE — 80053 COMPREHEN METABOLIC PANEL: CPT

## 2019-10-13 ENCOUNTER — APPOINTMENT (OUTPATIENT)
Dept: RADIOLOGY | Facility: MEDICAL CENTER | Age: 64
End: 2019-10-13
Attending: EMERGENCY MEDICINE
Payer: MEDICARE

## 2019-10-13 ENCOUNTER — HOSPITAL ENCOUNTER (EMERGENCY)
Facility: MEDICAL CENTER | Age: 64
End: 2019-10-13
Attending: EMERGENCY MEDICINE
Payer: MEDICARE

## 2019-10-13 VITALS
WEIGHT: 156.53 LBS | SYSTOLIC BLOOD PRESSURE: 141 MMHG | OXYGEN SATURATION: 95 % | BODY MASS INDEX: 25.16 KG/M2 | HEART RATE: 95 BPM | RESPIRATION RATE: 18 BRPM | TEMPERATURE: 97.2 F | DIASTOLIC BLOOD PRESSURE: 85 MMHG | HEIGHT: 66 IN

## 2019-10-13 DIAGNOSIS — S62.336A CLOSED DISPLACED FRACTURE OF NECK OF FIFTH METACARPAL BONE OF RIGHT HAND, INITIAL ENCOUNTER: ICD-10-CM

## 2019-10-13 PROCEDURE — 700111 HCHG RX REV CODE 636 W/ 250 OVERRIDE (IP): Performed by: EMERGENCY MEDICINE

## 2019-10-13 PROCEDURE — 29125 APPL SHORT ARM SPLINT STATIC: CPT

## 2019-10-13 PROCEDURE — 99284 EMERGENCY DEPT VISIT MOD MDM: CPT

## 2019-10-13 PROCEDURE — 90715 TDAP VACCINE 7 YRS/> IM: CPT | Performed by: EMERGENCY MEDICINE

## 2019-10-13 PROCEDURE — 700102 HCHG RX REV CODE 250 W/ 637 OVERRIDE(OP): Performed by: EMERGENCY MEDICINE

## 2019-10-13 PROCEDURE — 302874 HCHG BANDAGE ACE 2 OR 3""

## 2019-10-13 PROCEDURE — 73130 X-RAY EXAM OF HAND: CPT | Mod: RT

## 2019-10-13 PROCEDURE — A9270 NON-COVERED ITEM OR SERVICE: HCPCS | Performed by: EMERGENCY MEDICINE

## 2019-10-13 PROCEDURE — 90471 IMMUNIZATION ADMIN: CPT

## 2019-10-13 RX ORDER — HYDROCODONE BITARTRATE AND ACETAMINOPHEN 5; 325 MG/1; MG/1
1-2 TABLET ORAL EVERY 4 HOURS PRN
Qty: 15 TAB | Refills: 0 | Status: SHIPPED | OUTPATIENT
Start: 2019-10-13 | End: 2019-10-16

## 2019-10-13 RX ORDER — HYDROCODONE BITARTRATE AND ACETAMINOPHEN 5; 325 MG/1; MG/1
1 TABLET ORAL ONCE
Status: COMPLETED | OUTPATIENT
Start: 2019-10-13 | End: 2019-10-13

## 2019-10-13 RX ADMIN — HYDROCODONE BITARTRATE AND ACETAMINOPHEN 1 TABLET: 5; 325 TABLET ORAL at 20:43

## 2019-10-13 RX ADMIN — CLOSTRIDIUM TETANI TOXOID ANTIGEN (FORMALDEHYDE INACTIVATED), CORYNEBACTERIUM DIPHTHERIAE TOXOID ANTIGEN (FORMALDEHYDE INACTIVATED), BORDETELLA PERTUSSIS TOXOID ANTIGEN (GLUTARALDEHYDE INACTIVATED), BORDETELLA PERTUSSIS FILAMENTOUS HEMAGGLUTININ ANTIGEN (FORMALDEHYDE INACTIVATED), BORDETELLA PERTUSSIS PERTACTIN ANTIGEN, AND BORDETELLA PERTUSSIS FIMBRIAE 2/3 ANTIGEN 0.5 ML: 5; 2; 2.5; 5; 3; 5 INJECTION, SUSPENSION INTRAMUSCULAR at 20:44

## 2019-10-13 ASSESSMENT — PAIN SCALES - WONG BAKER: WONGBAKER_NUMERICALRESPONSE: HURTS EVEN MORE

## 2019-10-14 NOTE — ED TRIAGE NOTES
"Chief Complaint   Patient presents with   • Hand Injury     pt tripped and fell while going upstairs. pt sustained injury to R wrist and arm.      /85   Pulse 100   Temp 36.2 °C (97.2 °F) (Temporal)   Resp 20   Ht 1.676 m (5' 6\")   Wt 71 kg (156 lb 8.4 oz)   LMP 04/29/2005 (LMP Unknown)   SpO2 98%   BMI 25.26 kg/m²     "

## 2019-10-14 NOTE — ED PROVIDER NOTES
ED Provider Note    CHIEF COMPLAINT  Chief Complaint   Patient presents with   • Hand Injury     pt tripped and fell while going upstairs. pt sustained injury to R wrist and arm.        HPI  Anu Manuel is a 64 y.o. female who presents with right hand pain.  She was walking up the stairs and fell and tripped in her hand fell underneath her she did not put her hand out to catch herself it just got caught underneath her.  Her primary areas of complaints are her knuckles.  She feels the pain in her mid forearm as well she is unable to wiggle her fingers secondary to pain.    REVIEW OF SYSTEMS  Positive for hand pain, negative for wrist pain.     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    SOCIAL HISTORY  Social History     Tobacco Use   • Smoking status: Current Every Day Smoker     Packs/day: 0.50     Years: 30.00     Pack years: 15.00     Last attempt to quit: 2013     Years since quittin.0   • Smokeless tobacco: Current User   • Tobacco comment: 1 ppd    Substance and Sexual Activity   • Alcohol use: No     Comment:     • Drug use: No   • Sexual activity: Not on file       SURGICAL HISTORY   has a past surgical history that includes hysterectomy, vaginal (); thyroid lobectomy (); lumpectomy (, ); appendectomy (); cervical disk and fusion anterior (08); tonsillectomy (); cervical fusion posterior (2009); hardware removal neuro (2009); neck exploration (2009); abdominal hysterectomy total;  "lumpectomy; lumbar laminectomy diskectomy (Right, 5/10/2016); shoulder decompression arthroscopic (6/17/08); clavicle distal excision (6/17/08); shoulder arthroscopy w/ rotator cuff repair (10/9/08); inj,foramen,l/s,1 level (Right, 8/31/2016); spinal cord stimulator (N/A, 10/26/2018); and thoracic laminectomy (N/A, 10/26/2018).    CURRENT MEDICATIONS  Home Medications    **Home medications have not yet been reviewed for this encounter**         ALLERGIES  Allergies   Allergen Reactions   • Ativan Unspecified      Extreme Restless leg  [id number]       PHYSICAL EXAM  VITAL SIGNS: /85   Pulse 95   Temp 36.2 °C (97.2 °F) (Temporal)   Resp 18   Ht 1.676 m (5' 6\")   Wt 71 kg (156 lb 8.4 oz)   LMP 04/29/2005 (LMP Unknown)   SpO2 95%   BMI 25.26 kg/m²   Constitutional: Alert in no apparent distress. Well apearing  HENT: Normocephalic, Atraumatic, Bilateral external ears normal. Nose normal.   Eyes:  Conjunctiva normal, non-icteric.   Lungs: Non-labored respirations  Skin: Warm, Dry, No erythema, No rash.   Neurologic: Alert, Grossly non-focal.   Psychiatric: Affect normal, Judgment normal, Mood normal, Appears appropriate and not intoxicated.   Extremities: Right hand with swelling of her second fourth and fifth MCP joints there is an abrasion over her middle finger on the dorsal aspect limited range of motion of her fingers secondary to pain no pain to palpation over the wrist or forearm      DIAGNOSTIC STUDIES / PROCEDURES  DX-HAND 3+ RIGHT   Final Result      Mildly angulated fracture of the distal fifth metacarpal.      Degenerative changes.      Atherosclerotic plaque.               COURSE & MEDICAL DECISION MAKING  Pertinent Labs & Imaging studies reviewed. (See chart for details)  This is a 64-year-old female that presents with right hand pain after a fall.  She has swelling over her fifth MCP joints and it does look like she has a mild angulated fracture of this area.  She was placed in ulnar gutter " splint and given pain medication for this.  She had an update of her tetanus shot as she did not know when the last time that was.  She will be given instructions to follow-up with renal orthopedic clinic as an outpatient patient is agreeable to this plan.     The patient will return for new or worsening symptoms and is stable at the time of discharge. Patient was given return precautions. Patient and/or family member verbalizes understanding and will comply.    DISPOSITION:  Patient will be discharged home in stable condition.    FOLLOW UP:  Seun Roman M.D.  [address] [place] [address]  [phone]    Schedule an appointment as soon as possible for a visit in 1 week  Call for appointment tomorrow    [facility], Emergency Dept  [address]  [place] [address] [phone]    If symptoms worsen worsening pain swelling or other concerns        OUTPATIENT MEDICATIONS:  Discharge Medication List as of 10/13/2019  8:59 PM      START taking these medications    Details   HYDROcodone-acetaminophen (NORCO) 5-325 MG Tab per tablet Take 1-2 Tabs by mouth every four hours as needed for up to 3 days., Disp-15 Tab, R-0, Print Rx Paper, For 3 days             Your blood pressure is elevated here in the emergency department. Please monitor your blood pressure over the next several days. If your blood pressure continues to be 120/80 or higher please contact your physician for blood pressure management.       FINAL IMPRESSION  1. Closed displaced fracture of neck of fifth metacarpal bone of right hand, initial encounter  HYDROcodone-acetaminophen (NORCO) 5-325 MG Tab per tablet       2.   3.     This dictation has been creating using voice recognition software. The accuracy of the dictation is limited the abilities of the software.  I expect there may be some errors of grammar and possibly content. I made every attempt to manually correct the errors within my dictation. However  errors related to this voice recognition software may still exist and should be interpreted within the appropriate context.        The note accurately reflects work and decisions made by me.  Laure Vaca  10/13/2019  9:15 PM

## 2019-10-22 ENCOUNTER — OFFICE VISIT (OUTPATIENT)
Dept: ENDOCRINOLOGY | Facility: MEDICAL CENTER | Age: 64
End: 2019-10-22
Payer: MEDICARE

## 2019-10-22 VITALS
DIASTOLIC BLOOD PRESSURE: 70 MMHG | OXYGEN SATURATION: 92 % | HEIGHT: 66 IN | BODY MASS INDEX: 25.71 KG/M2 | WEIGHT: 160 LBS | HEART RATE: 97 BPM | SYSTOLIC BLOOD PRESSURE: 120 MMHG

## 2019-10-22 DIAGNOSIS — Z79.4 ENCOUNTER FOR LONG-TERM (CURRENT) USE OF INSULIN (HCC): ICD-10-CM

## 2019-10-22 LAB
HBA1C MFR BLD: 10.7 % (ref 0–5.6)
INT CON NEG: NEGATIVE
INT CON POS: POSITIVE

## 2019-10-22 PROCEDURE — 99214 OFFICE O/P EST MOD 30 MIN: CPT | Performed by: PHYSICIAN ASSISTANT

## 2019-10-22 PROCEDURE — 83036 HEMOGLOBIN GLYCOSYLATED A1C: CPT | Performed by: PHYSICIAN ASSISTANT

## 2019-10-22 RX ORDER — ATORVASTATIN CALCIUM 10 MG/1
TABLET, FILM COATED ORAL
Refills: 0 | Status: ON HOLD | COMMUNITY
Start: 2019-10-01 | End: 2019-12-16

## 2019-10-22 RX ORDER — LISINOPRIL 20 MG/1
TABLET ORAL
Refills: 0 | Status: ON HOLD | COMMUNITY
Start: 2019-10-01 | End: 2019-12-16

## 2019-10-22 NOTE — PATIENT INSTRUCTIONS
They are on:  1.  Novolog   Carb ratio 1:5   Start a 1:50 above 100    2.  Tresiba 24 units at night

## 2019-10-22 NOTE — PROGRESS NOTES
Return to office Patient Consult Note  Referred by: Jarrell Yates M.D.    Reason for consult: Diabetes Management Type 1    HPI:  Anu Manuel is a 64 y.o. old patient who is seeing us today for diabetes care.  This is a pleasant patient with diabetes and I appreciate the opportunity to participate in the care of this patient.    Labs of 10/22/2019 HbA1c is 10.7  Labs of  5/23/2019 HbA1c is 19.4   Labs of 2/12/19 HbA1c is 11.7  Labs of 11/30/18 GFR >60, TSH 14.4, HbA1c is 10.9  Labs of 3/28/18 TSH 11.5    BG Diary:10/22/2019  In the AM:  No log      1. Type 1 diabetes mellitus with neurological manifestations, uncontrolled ([facility])  This is a new patient with me on 1/30/19     They are on:  1.  Novolog   Carb ratio 1:5   Start a 1:50 above 150  150-200 1 units  201 -250 2 units  251 -300 3 units  301 - 350 4 units  351 - 400 5 units  401 - 450 6 units  451 - 500 7 units  501 - 550 8 units  600 - 650 9 units  2.  Tresiba 24 units at night    States running 110 in the morning     2. Encounter for long-term (current) use of insulin ([facility])  Is on a high risk medication Insulin and we will continue to follow          ROS:   Constitutional: No night sweats.  Eyes:  No visual changes.  Cardiac: No chest pain, No palpitations or racing heart rate.  Resp: No shortness of breath, No cough,   Gi: No Diarrhea    All other systems were reviewed and were/are negative.      Past Medical History:  Patient Active Problem List    Diagnosis Date Noted   • Cellulitis 03/15/2018     Priority: High   • Chronic pain of right lower extremity 03/15/2018   • Acute left lumbar radiculopathy 08/31/2016   • Lumbar spinal stenosis 05/10/2016   • Type 1 diabetes mellitus with neurological manifestations, uncontrolled ([facility]) 06/10/2015   • Diabetic polyneuropathy ([facility]) 06/10/2015   • Vertigo 04/08/2015   • Encounter for long-term (current) use of insulin ([facility]) 09/25/2013   • Accidental drug overdose 08/27/2012   • Vitamin d deficiency  03/14/2012   • Hypothyroidism, postsurgical    • Diabetes mellitus type 1 (HCC)    • Cigarette smoker        Past Surgical History:  Past Surgical History:   Procedure Laterality Date   • SPINAL CORD STIMULATOR N/A 10/26/2018    Procedure: SPINAL CORD STIMULATOR;  Surgeon: Ryan Roman M.D.;  Location: [facility];  Service: Neurosurgery   • THORACIC LAMINECTOMY N/A 10/26/2018    Procedure: THORACIC LAMINECTOMY - FOR;  Surgeon: Ryan Roman M.D.;  Location: [facility];  Service: Neurosurgery   • PB INJ,FORAMEN,L/S,1 LEVEL Right 8/31/2016    Procedure: INJ-FORAMEN EPI LUM/SAC SNGL L4-5;  Surgeon: Sukhi Godfrey M.D.;  Location: [facility];  Service: Pain Management   • LUMBAR LAMINECTOMY DISKECTOMY Right 5/10/2016    Procedure: LUMBAR L4-5 HEMILAMINECTOMY DISKECTOMY ;  Surgeon: Arnold Keyes M.D.;  Location: [facility];  Service:    • CERVICAL FUSION POSTERIOR  1/16/2009    Performed by TARA CONTRERAS at [facility]   • HARDWARE REMOVAL NEURO  1/16/2009    Performed by TARA CONTRERAS at [facility]   • NECK EXPLORATION  1/16/2009    Performed by TARA CONTRERAS at [facility]   • SHOULDER ARTHROSCOPY W/ ROTATOR CUFF REPAIR  10/9/08    Performed by SHERLY CASTANEDA at [facility]   • SHOULDER DECOMPRESSION ARTHROSCOPIC  6/17/08    Performed by SHERLY CASTANEDA at [facility]   • CLAVICLE DISTAL EXCISION  6/17/08    Performed by SHERLY CASTANEDA at [facility]   • CERVICAL DISK AND FUSION ANTERIOR  03/12/08   • HYSTERECTOMY, VAGINAL  2006   • APPENDECTOMY  2004   • THYROID LOBECTOMY  1973   • TONSILLECTOMY  1963   • ABDOMINAL HYSTERECTOMY TOTAL     • LUMPECTOMY  1976, 2005    Breast    • LUMPECTOMY         Allergies:  Ativan    Social History:  Social History     Socioeconomic History   • Marital status:      Spouse name: Not on file   • Number of children: Not on file   • Years of  education: Not on file   • Highest education level: Not on file   Occupational History   • Not on file   Social Needs   • Financial resource strain: Not on file   • Food insecurity:     Worry: Not on file     Inability: Not on file   • Transportation needs:     Medical: Not on file     Non-medical: Not on file   Tobacco Use   • Smoking status: Current Every Day Smoker     Packs/day: 0.50     Years: 30.00     Pack years: 15.00     Last attempt to quit: 2013     Years since quittin.1   • Smokeless tobacco: Current User   • Tobacco comment: 1 ppd    Substance and Sexual Activity   • Alcohol use: No     Comment:     • Drug use: No   • Sexual activity: Not on file   Lifestyle   • Physical activity:     Days per week: Not on file     Minutes per session: Not on file   • Stress: Not on file   Relationships   • Social connections:     Talks on phone: Not on file     Gets together: Not on file     Attends Orthodoxy service: Not on file     Active member of club or organization: Not on file     Attends meetings of clubs or organizations: Not on file     Relationship status: Not on file   • Intimate partner violence:     Fear of current or ex partner: Not on file     Emotionally abused: Not on file     Physically abused: Not on file     Forced sexual activity: Not on file   Other Topics Concern   • Not on file   Social History Narrative   • Not on file       Family History:  Family History   Problem Relation Age of Onset   • Hypertension Mother    • Cancer Father        Medications:    Current Outpatient Medications:   •  atorvastatin (LIPITOR) 10 MG Tab, TAKE 1 TABLET BY MOUTH EVERYDAY AT BEDTIME, Disp: , Rfl: 0  •  lisinopril (PRINIVIL) 20 MG Tab, TAKE 1 TABLET BY MOUTH TWICE A DAY, Disp: , Rfl: 0  •  Insulin Degludec (TRESIBA FLEXTOUCH) 200 UNIT/ML Solution Pen-injector, Inject 50 Units as instructed every bedtime., Disp: 3 PEN, Rfl: 2  •  NOVOLOG, insulin aspart, (NOVOLOG FLEXPEN) 100 UNIT/ML Solution Pen-injector  "injection, Inject 20 Units as instructed 3 times a day before meals., Disp: 5 PEN, Rfl: 11  •  levothyroxine (SYNTHROID) 175 MCG Tab, Take 175 mcg by mouth Every morning on an empty stomach., Disp: , Rfl:   •  glucose blood (FREESTYLE LITE) strip, 1 Strip by Other route 6 Times a Day., Disp: 150 Strip, Rfl: 11  •  Glucagon, rDNA, 1 MG Kit, 1 mg by Injection route Once PRN (use for severe Hypoglycemia only) for up to 1 dose., Disp: 2 Kit, Rfl: 3  •  Calcium Carbonate-Vitamin D (CALCIUM 600/VITAMIN D PO), Take 1 Tab by mouth every bedtime., Disp: , Rfl:         Physical Examination:   Vital signs: /70 (BP Location: Left arm, Patient Position: Sitting)   Pulse 97   Ht 1.676 m (5' 6\")   Wt 72.6 kg (160 lb)   LMP 04/29/2005 (LMP Unknown)   SpO2 92%   BMI 25.82 kg/m²   General: No distress, cooperative, well dressed and well nourished.   Eyes: No scleral icterus or discharge, No hyposphagma  ENMT: Normal on external inspection of nose, lips, No nasal drainage   Neck: No abnormal masses on inspection  Resp: Normal effort, Bilateral clear to auscultation, No wheezing, No rales  CVS: Regular rate and rhythm, S1 S2 normal, No murmur. No gallop  Extremities: No edema bilateral extremities  Neuro: Alert and oriented  Skin: No rash, No Ulcers  Psych: Normal mood and affect      Assessment and Plan:    1. Type 1 diabetes mellitus with neurological manifestations, uncontrolled (HCC)  They are on:  1.  Novolog   Carb ratio 1:5   Start a 1:50 above 100    2.  Tresiba 24 units at night    Give a John sample    2. Encounter for long-term (current) use of insulin (HCC)      FOR MEDICARE  1.  This patient is checking a blood glucose level through finger sticks more than 4 times a day for the past 60 days and this has been scanned into the chart.  Or has been wearing an CGM continuously. A Dexcom or John  2.  This patient is taking 4-5 injections a day of insulin. Or is on a continuous insulin pump  3.  This patients " Average BG is 283 with a HbA1c of 11.7  4.  This patient is going low (Hypoglycemia) on a daily basis.  I am ordering a CGM (continous glucose monitor)      Return in about 3 months (around 1/22/2020).      Thank you kindly for allowing me to participate in the diabetes care plan for this patient.    Pantera Mobley PA-C, [facility]  Board Certified - Advanced Diabetes Management  10/22/19    CC:   Jarrell Yates M.D.

## 2019-10-31 RX ORDER — FLASH GLUCOSE SENSOR
1 KIT MISCELLANEOUS
Qty: 2 EACH | Refills: 11 | Status: ON HOLD | OUTPATIENT
Start: 2019-10-31 | End: 2019-12-16

## 2019-10-31 RX ORDER — FLASH GLUCOSE SENSOR
1 KIT MISCELLANEOUS
Qty: 1 DEVICE | Refills: 1 | Status: ON HOLD | OUTPATIENT
Start: 2019-10-31 | End: 2019-12-16

## 2019-12-06 ENCOUNTER — HOSPITAL ENCOUNTER (OUTPATIENT)
Dept: RADIOLOGY | Facility: MEDICAL CENTER | Age: 64
End: 2019-12-06
Attending: NURSE PRACTITIONER
Payer: MEDICARE

## 2019-12-06 DIAGNOSIS — T85.192D OTHER MECHANICAL COMPLICATION OF IMPLANTED ELECTRONIC NEUROSTIMULATOR OF SPINAL CORD ELECTRODE (LEAD), SUBSEQUENT ENCOUNTER: ICD-10-CM

## 2019-12-06 PROCEDURE — 72020 X-RAY EXAM OF SPINE 1 VIEW: CPT

## 2019-12-12 DIAGNOSIS — Z01.810 PRE-OPERATIVE CARDIOVASCULAR EXAMINATION: ICD-10-CM

## 2019-12-12 DIAGNOSIS — Z01.83 ENCOUNTER FOR BLOOD TYPING: ICD-10-CM

## 2019-12-12 DIAGNOSIS — Z01.812 PRE-OPERATIVE LABORATORY EXAMINATION: ICD-10-CM

## 2019-12-12 LAB
ABO GROUP BLD: NORMAL
ANION GAP SERPL CALC-SCNC: 10 MMOL/L (ref 0–11.9)
APTT PPP: 25.1 SEC (ref 24.7–36)
BASOPHILS # BLD AUTO: 1.4 % (ref 0–1.8)
BASOPHILS # BLD: 0.1 K/UL (ref 0–0.12)
BLD GP AB SCN SERPL QL: NORMAL
BUN SERPL-MCNC: 18 MG/DL (ref 8–22)
CALCIUM SERPL-MCNC: 8.4 MG/DL (ref 8.5–10.5)
CHLORIDE SERPL-SCNC: 107 MMOL/L (ref 96–112)
CO2 SERPL-SCNC: 24 MMOL/L (ref 20–33)
CREAT SERPL-MCNC: 0.9 MG/DL (ref 0.5–1.4)
EKG IMPRESSION: NORMAL
EOSINOPHIL # BLD AUTO: 0.26 K/UL (ref 0–0.51)
EOSINOPHIL NFR BLD: 3.8 % (ref 0–6.9)
ERYTHROCYTE [DISTWIDTH] IN BLOOD BY AUTOMATED COUNT: 46.5 FL (ref 35.9–50)
GLUCOSE SERPL-MCNC: 147 MG/DL (ref 65–99)
HCT VFR BLD AUTO: 41.3 % (ref 37–47)
HGB BLD-MCNC: 13.2 G/DL (ref 12–16)
IMM GRANULOCYTES # BLD AUTO: 0.07 K/UL (ref 0–0.11)
IMM GRANULOCYTES NFR BLD AUTO: 1 % (ref 0–0.9)
INR PPP: 0.82 (ref 0.87–1.13)
LYMPHOCYTES # BLD AUTO: 1.47 K/UL (ref 1–4.8)
LYMPHOCYTES NFR BLD: 21.2 % (ref 22–41)
MCH RBC QN AUTO: 31 PG (ref 27–33)
MCHC RBC AUTO-ENTMCNC: 32 G/DL (ref 33.6–35)
MCV RBC AUTO: 96.9 FL (ref 81.4–97.8)
MONOCYTES # BLD AUTO: 0.53 K/UL (ref 0–0.85)
MONOCYTES NFR BLD AUTO: 7.7 % (ref 0–13.4)
NEUTROPHILS # BLD AUTO: 4.49 K/UL (ref 2–7.15)
NEUTROPHILS NFR BLD: 64.9 % (ref 44–72)
NRBC # BLD AUTO: 0 K/UL
NRBC BLD-RTO: 0 /100 WBC
PLATELET # BLD AUTO: 310 K/UL (ref 164–446)
PMV BLD AUTO: 9.4 FL (ref 9–12.9)
POTASSIUM SERPL-SCNC: 4.1 MMOL/L (ref 3.6–5.5)
PROTHROMBIN TIME: 11.5 SEC (ref 12–14.6)
RBC # BLD AUTO: 4.26 M/UL (ref 4.2–5.4)
RH BLD: NORMAL
SODIUM SERPL-SCNC: 141 MMOL/L (ref 135–145)
WBC # BLD AUTO: 6.9 K/UL (ref 4.8–10.8)

## 2019-12-12 PROCEDURE — 85730 THROMBOPLASTIN TIME PARTIAL: CPT

## 2019-12-12 PROCEDURE — 93010 ELECTROCARDIOGRAM REPORT: CPT | Performed by: INTERNAL MEDICINE

## 2019-12-12 PROCEDURE — 93005 ELECTROCARDIOGRAM TRACING: CPT

## 2019-12-12 PROCEDURE — 86901 BLOOD TYPING SEROLOGIC RH(D): CPT

## 2019-12-12 PROCEDURE — 80048 BASIC METABOLIC PNL TOTAL CA: CPT

## 2019-12-12 PROCEDURE — 85610 PROTHROMBIN TIME: CPT

## 2019-12-12 PROCEDURE — 86850 RBC ANTIBODY SCREEN: CPT

## 2019-12-12 PROCEDURE — 86900 BLOOD TYPING SEROLOGIC ABO: CPT

## 2019-12-12 PROCEDURE — 85025 COMPLETE CBC W/AUTO DIFF WBC: CPT

## 2019-12-12 PROCEDURE — 36415 COLL VENOUS BLD VENIPUNCTURE: CPT

## 2019-12-12 RX ORDER — IBUPROFEN 200 MG
200 TABLET ORAL PRN
Status: ON HOLD | COMMUNITY
End: 2019-12-16

## 2019-12-16 ENCOUNTER — ANESTHESIA (OUTPATIENT)
Dept: SURGERY | Facility: MEDICAL CENTER | Age: 64
End: 2019-12-16
Payer: MEDICARE

## 2019-12-16 ENCOUNTER — HOSPITAL ENCOUNTER (OUTPATIENT)
Facility: MEDICAL CENTER | Age: 64
End: 2019-12-16
Attending: NEUROLOGICAL SURGERY | Admitting: NEUROLOGICAL SURGERY
Payer: MEDICARE

## 2019-12-16 ENCOUNTER — APPOINTMENT (OUTPATIENT)
Dept: RADIOLOGY | Facility: MEDICAL CENTER | Age: 64
End: 2019-12-16
Attending: NEUROLOGICAL SURGERY
Payer: MEDICARE

## 2019-12-16 ENCOUNTER — ANESTHESIA EVENT (OUTPATIENT)
Dept: SURGERY | Facility: MEDICAL CENTER | Age: 64
End: 2019-12-16
Payer: MEDICARE

## 2019-12-16 VITALS
TEMPERATURE: 97.1 F | HEIGHT: 66 IN | SYSTOLIC BLOOD PRESSURE: 139 MMHG | BODY MASS INDEX: 26.61 KG/M2 | OXYGEN SATURATION: 92 % | HEART RATE: 88 BPM | WEIGHT: 165.57 LBS | DIASTOLIC BLOOD PRESSURE: 66 MMHG | RESPIRATION RATE: 18 BRPM

## 2019-12-16 LAB
GLUCOSE BLD-MCNC: 118 MG/DL (ref 65–99)
GLUCOSE BLD-MCNC: 122 MG/DL (ref 65–99)
GLUCOSE BLD-MCNC: 135 MG/DL (ref 65–99)
GLUCOSE BLD-MCNC: 60 MG/DL (ref 65–99)

## 2019-12-16 PROCEDURE — 502240 HCHG MISC OR SUPPLY RC 0272: Performed by: NEUROLOGICAL SURGERY

## 2019-12-16 PROCEDURE — 700105 HCHG RX REV CODE 258

## 2019-12-16 PROCEDURE — 160009 HCHG ANES TIME/MIN: Performed by: NEUROLOGICAL SURGERY

## 2019-12-16 PROCEDURE — 160041 HCHG SURGERY MINUTES - EA ADDL 1 MIN LEVEL 4: Performed by: NEUROLOGICAL SURGERY

## 2019-12-16 PROCEDURE — 160048 HCHG OR STATISTICAL LEVEL 1-5: Performed by: NEUROLOGICAL SURGERY

## 2019-12-16 PROCEDURE — 160002 HCHG RECOVERY MINUTES (STAT): Performed by: NEUROLOGICAL SURGERY

## 2019-12-16 PROCEDURE — 700111 HCHG RX REV CODE 636 W/ 250 OVERRIDE (IP)

## 2019-12-16 PROCEDURE — 160035 HCHG PACU - 1ST 60 MINS PHASE I: Performed by: NEUROLOGICAL SURGERY

## 2019-12-16 PROCEDURE — 700101 HCHG RX REV CODE 250: Performed by: NEUROLOGICAL SURGERY

## 2019-12-16 PROCEDURE — 700105 HCHG RX REV CODE 258: Performed by: NEUROLOGICAL SURGERY

## 2019-12-16 PROCEDURE — 160029 HCHG SURGERY MINUTES - 1ST 30 MINS LEVEL 4: Performed by: NEUROLOGICAL SURGERY

## 2019-12-16 PROCEDURE — 700102 HCHG RX REV CODE 250 W/ 637 OVERRIDE(OP)

## 2019-12-16 PROCEDURE — 82962 GLUCOSE BLOOD TEST: CPT

## 2019-12-16 PROCEDURE — 160036 HCHG PACU - EA ADDL 30 MINS PHASE I: Performed by: NEUROLOGICAL SURGERY

## 2019-12-16 PROCEDURE — 160046 HCHG PACU - 1ST 60 MINS PHASE II: Performed by: NEUROLOGICAL SURGERY

## 2019-12-16 PROCEDURE — 160025 RECOVERY II MINUTES (STATS): Performed by: NEUROLOGICAL SURGERY

## 2019-12-16 PROCEDURE — 700101 HCHG RX REV CODE 250

## 2019-12-16 PROCEDURE — 72020 X-RAY EXAM OF SPINE 1 VIEW: CPT

## 2019-12-16 PROCEDURE — C1778 LEAD, NEUROSTIMULATOR: HCPCS | Performed by: NEUROLOGICAL SURGERY

## 2019-12-16 PROCEDURE — 501838 HCHG SUTURE GENERAL: Performed by: NEUROLOGICAL SURGERY

## 2019-12-16 PROCEDURE — 500866 HCHG NEEDLE, SPINAL 25G: Performed by: NEUROLOGICAL SURGERY

## 2019-12-16 PROCEDURE — A9270 NON-COVERED ITEM OR SERVICE: HCPCS

## 2019-12-16 PROCEDURE — 502000 HCHG MISC OR IMPLANTS RC 0278: Performed by: NEUROLOGICAL SURGERY

## 2019-12-16 DEVICE — IMPLANTABLE DEVICE: Type: IMPLANTABLE DEVICE | Site: BACK | Status: FUNCTIONAL

## 2019-12-16 RX ORDER — DEXTROSE AND SODIUM CHLORIDE 5; .9 G/100ML; G/100ML
INJECTION, SOLUTION INTRAVENOUS
Status: COMPLETED
Start: 2019-12-16 | End: 2019-12-16

## 2019-12-16 RX ORDER — LISINOPRIL 20 MG/1
20 TABLET ORAL EVERY EVENING
COMMUNITY
End: 2020-01-18

## 2019-12-16 RX ORDER — SODIUM CHLORIDE 9 MG/ML
INJECTION, SOLUTION INTRAVENOUS
Status: DISCONTINUED
Start: 2019-12-16 | End: 2019-12-16 | Stop reason: HOSPADM

## 2019-12-16 RX ORDER — OXYCODONE HYDROCHLORIDE AND ACETAMINOPHEN 5; 325 MG/1; MG/1
1 TABLET ORAL
Status: COMPLETED | OUTPATIENT
Start: 2019-12-16 | End: 2019-12-16

## 2019-12-16 RX ORDER — CEFAZOLIN SODIUM 1 G/3ML
INJECTION, POWDER, FOR SOLUTION INTRAMUSCULAR; INTRAVENOUS PRN
Status: DISCONTINUED | OUTPATIENT
Start: 2019-12-16 | End: 2019-12-16 | Stop reason: SURG

## 2019-12-16 RX ORDER — CEPHALEXIN 500 MG/1
1000 CAPSULE ORAL 3 TIMES DAILY
Qty: 30 CAP | Refills: 0 | Status: SHIPPED | OUTPATIENT
Start: 2019-12-16 | End: 2019-12-21

## 2019-12-16 RX ORDER — DEXTROSE AND SODIUM CHLORIDE 5; .9 G/100ML; G/100ML
1000 INJECTION, SOLUTION INTRAVENOUS ONCE
Status: COMPLETED | OUTPATIENT
Start: 2019-12-16 | End: 2019-12-16

## 2019-12-16 RX ORDER — DEXTROSE AND SODIUM CHLORIDE 5; .45 G/100ML; G/100ML
INJECTION, SOLUTION INTRAVENOUS
Status: DISCONTINUED | OUTPATIENT
Start: 2019-12-16 | End: 2019-12-16 | Stop reason: SURG

## 2019-12-16 RX ORDER — ONDANSETRON 2 MG/ML
4 INJECTION INTRAMUSCULAR; INTRAVENOUS
Status: DISCONTINUED | OUTPATIENT
Start: 2019-12-16 | End: 2019-12-16 | Stop reason: HOSPADM

## 2019-12-16 RX ORDER — OXYCODONE HYDROCHLORIDE AND ACETAMINOPHEN 5; 325 MG/1; MG/1
2 TABLET ORAL
Status: COMPLETED | OUTPATIENT
Start: 2019-12-16 | End: 2019-12-16

## 2019-12-16 RX ORDER — SODIUM CHLORIDE, SODIUM LACTATE, POTASSIUM CHLORIDE, CALCIUM CHLORIDE 600; 310; 30; 20 MG/100ML; MG/100ML; MG/100ML; MG/100ML
INJECTION, SOLUTION INTRAVENOUS CONTINUOUS
Status: DISCONTINUED | OUTPATIENT
Start: 2019-12-16 | End: 2019-12-16 | Stop reason: HOSPADM

## 2019-12-16 RX ORDER — BACITRACIN 50000 [IU]/1
INJECTION, POWDER, FOR SOLUTION INTRAMUSCULAR
Status: DISCONTINUED | OUTPATIENT
Start: 2019-12-16 | End: 2019-12-16 | Stop reason: HOSPADM

## 2019-12-16 RX ORDER — DIPHENHYDRAMINE HYDROCHLORIDE 50 MG/ML
12.5 INJECTION INTRAMUSCULAR; INTRAVENOUS
Status: DISCONTINUED | OUTPATIENT
Start: 2019-12-16 | End: 2019-12-16 | Stop reason: HOSPADM

## 2019-12-16 RX ORDER — LIDOCAINE HYDROCHLORIDE 10 MG/ML
INJECTION, SOLUTION EPIDURAL; INFILTRATION; INTRACAUDAL; PERINEURAL
Status: COMPLETED
Start: 2019-12-16 | End: 2019-12-16

## 2019-12-16 RX ORDER — ONDANSETRON 2 MG/ML
INJECTION INTRAMUSCULAR; INTRAVENOUS PRN
Status: DISCONTINUED | OUTPATIENT
Start: 2019-12-16 | End: 2019-12-16 | Stop reason: SURG

## 2019-12-16 RX ORDER — HALOPERIDOL 5 MG/ML
1 INJECTION INTRAMUSCULAR
Status: DISCONTINUED | OUTPATIENT
Start: 2019-12-16 | End: 2019-12-16 | Stop reason: HOSPADM

## 2019-12-16 RX ORDER — ATORVASTATIN CALCIUM 20 MG/1
20 TABLET, FILM COATED ORAL NIGHTLY
Status: ON HOLD | COMMUNITY
End: 2020-08-11

## 2019-12-16 RX ADMIN — LIDOCAINE HYDROCHLORIDE 0.5 ML: 10 INJECTION, SOLUTION EPIDURAL; INFILTRATION; INTRACAUDAL at 07:56

## 2019-12-16 RX ADMIN — SODIUM CHLORIDE, POTASSIUM CHLORIDE, SODIUM LACTATE AND CALCIUM CHLORIDE: 600; 310; 30; 20 INJECTION, SOLUTION INTRAVENOUS at 07:56

## 2019-12-16 RX ADMIN — OXYCODONE HYDROCHLORIDE AND ACETAMINOPHEN 2 TABLET: 5; 325 TABLET ORAL at 13:26

## 2019-12-16 RX ADMIN — DEXTROSE AND SODIUM CHLORIDE 1000 ML: 5; .9 INJECTION, SOLUTION INTRAVENOUS at 10:54

## 2019-12-16 RX ADMIN — Medication 0.5 ML: at 07:56

## 2019-12-16 RX ADMIN — ONDANSETRON 8 MG: 2 INJECTION INTRAMUSCULAR; INTRAVENOUS at 11:08

## 2019-12-16 RX ADMIN — FENTANYL CITRATE 50 MCG: 0.05 INJECTION, SOLUTION INTRAMUSCULAR; INTRAVENOUS at 13:08

## 2019-12-16 RX ADMIN — ROCURONIUM BROMIDE 40 MG: 10 INJECTION, SOLUTION INTRAVENOUS at 11:08

## 2019-12-16 RX ADMIN — EPHEDRINE SULFATE 10 MG: 50 INJECTION, SOLUTION INTRAVENOUS at 12:09

## 2019-12-16 RX ADMIN — DEXTROSE AND SODIUM CHLORIDE 1000 ML: 5; 900 INJECTION, SOLUTION INTRAVENOUS at 10:54

## 2019-12-16 RX ADMIN — PROPOFOL 200 MG: 10 INJECTION, EMULSION INTRAVENOUS at 11:08

## 2019-12-16 RX ADMIN — CEFAZOLIN 2 G: 330 INJECTION, POWDER, FOR SOLUTION INTRAMUSCULAR; INTRAVENOUS at 11:08

## 2019-12-16 RX ADMIN — EPHEDRINE SULFATE 10 MG: 50 INJECTION, SOLUTION INTRAVENOUS at 11:54

## 2019-12-16 RX ADMIN — FENTANYL CITRATE 50 MCG: 0.05 INJECTION, SOLUTION INTRAMUSCULAR; INTRAVENOUS at 13:28

## 2019-12-16 RX ADMIN — DEXTROSE MONOHYDRATE AND SODIUM CHLORIDE: 5; .45 INJECTION, SOLUTION INTRAVENOUS at 11:08

## 2019-12-16 NOTE — OP REPORT
NEUROSURGERY OPERATIVE NOTE  DATE:  12:54 PM 2019    PATIENT NAME:  Anu Manuel   1955 MRN [id number]      PROCEDURE:  1.  Replacement spinal cord stimulator  2.  Modifier 22: Increased technical difficulty and length of procedure due to distance diffuse extensive scar formation around epidural leads.    Surgeon:  Ryan Roman MD, PhD  Assistant: DANNY Muñoz    Anesthesia:  GETA    Diagnosis: Low back pain, mechanical failure spinal cord stimulator    Indication: 64-year-old female who had a spinal cord stimulator placed for low back pain.  She was having good results.  She recently fell, and is now having electric shocking sensation just superior to the generator/battery.  Imaging is unremarkable.  Exploration and revision of the system is planned.    Procedure:  The patient was identified in the holding area, and the surgery site marked, consent was obtained.  The patinet was brought back to the operating room and intubated by anesthesia service.  2 g  Ancef was administered intravenously.  She was transferred to the OSI table in a prone manner and all pressure points well padded.  Their lumbar region was prepped with chlorhexidine and betadine scrub, and Chloroprep.  Sterile drapes were applied including a layer of Ioban.  The previous incisions were infiltrated with 0.5% Marcaine with epinephrine.    The battery/generator was addressed first.  The skin was incised sharply dissection carried deeply using monopolar cautery.  The battery was removed, and no obvious fracture or displacement of the lead noted.  The leads were removed, and the battery noted to be functioning.  At this point, microfracture of the electrode was thought to be the issue.  Removal and replacement of the system was then undertaken.    The midline thoracic incision was opened sharply and dissection carried deeply using monopolar cautery.  This exposed the laminotomy site.  Dense scar formation had formed  around the electrodes in the epidural space.  This was gently dissected free from the underlying dura using curved curettes, and removed using Kerrison rongeurs.  The leads and paddle were removed intact.  The operative site was pineda irrigated normal saline bacitracin irrigation.  A new paddle lead was then placed in the same position, verified fluoroscopically.  The lead was spanning the T8 vertebral body.  The thoracic fascia was reapproximated using 3-0 Vicryl suture in interrupted inverted manner.  The leads were then placed and attention loop, and secured to the underlying fascia using provided Silastic anchors.  A lead tunneler was passed from the thoracic to the right flank incision, and the leads passed through the supplied sheath.  The sheath was removed, the leads were attached to the original battery and placed in the subcutaneous pocket.  Good lead function was verified.  The operative site was copiously irrigated with normal saline bacitracin irrigation.      The dermis was reapproximated using 0 Vicryl suture in interrupted inverted manner.  The skin was reapproximated using staples.  Sterile dressings were applied.    Final counts were correct.    FINDINGS: No obvious lead fracture or disconnection.  Complete replacement of spinal cord stimulator system.  SPECIMEN:  None  DRAINS: None  EBL:  10 CC  COMPLICATIONS:  None apparent at end of procedure.

## 2019-12-16 NOTE — ANESTHESIA POSTPROCEDURE EVALUATION
Patient: Anu Manuel    Procedure Summary     Date:  12/16/19 Room / Location:  [facility] 05 / SURGERY [facility] ORS    Anesthesia Start:  1108 Anesthesia Stop:  1252    Procedure:  EXPLORATION AT THORACIC 8 - 9, REPLACEMENT OF  NEUROSTIMULATOR LEAD (N/A Back) Diagnosis:  (MECHANICAL COMPLICATION OF NERVOUS SYSTEM IMPLANT)    Surgeon:  Ryan Roman M.D. Responsible Provider:  Tello Lacey M.D.    Anesthesia Type:  general ASA Status:  3          Final Anesthesia Type: general  Last vitals  BP   Blood Pressure: 156/86    Temp   36.6 °C (97.9 °F)    Pulse   Pulse: 87   Resp   18    SpO2   98 %      Anesthesia Post Evaluation    Patient location during evaluation: PACU  Patient participation: complete - patient participated  Level of consciousness: awake and alert    Airway patency: patent  Anesthetic complications: no  Cardiovascular status: hemodynamically stable  Respiratory status: acceptable  Hydration status: euvolemic    PONV: none           Nurse Pain Score: 3 (NPRS)

## 2019-12-16 NOTE — ANESTHESIA PROCEDURE NOTES
Airway  Date/Time: 12/16/2019 11:11 AM  Performed by: Tello Lacey M.D.  Authorized by: Tello Lacey M.D.     Location:  OR  Urgency:  Elective  Indications for Airway Management:  Anesthesia  Spontaneous Ventilation: absent    Sedation Level:  Deep  Preoxygenated: Yes    Patient Position:  Sniffing  Final Airway Type:  Endotracheal airway  Final Endotracheal Airway:  ETT  Cuffed: Yes    Technique Used for Successful ETT Placement:  Direct laryngoscopy  Insertion Site:  Oral  Blade Type:  Matti  Laryngoscope Blade/Videolaryngoscope Blade Size:  3  ETT Size (mm):  7.0  Measured from:  Teeth  ETT to Teeth (cm):  22  Placement Verified by: auscultation and capnometry    Cormack-Lehane Classification:  Grade I - full view of glottis  Number of Attempts at Approach:  1

## 2019-12-16 NOTE — OR NURSING
1425 rec pt from pacu, a&o, assisted OOB to recliner, c/o pain across thoracic back.  Incisions x2 - dressings CD&I.  Oriented to room.  Call light within reach.

## 2019-12-16 NOTE — ANESTHESIA TIME REPORT
Anesthesia Start and Stop Event Times     Date Time Event    12/16/2019 1104 Ready for Procedure     1108 Anesthesia Start     1252 Anesthesia Stop        Responsible Staff  12/16/19    Name Role Begin End    Tello Lacey M.D. Anesth 1123 1252        Preop Diagnosis (Free Text):  Pre-op Diagnosis     MECHANICAL COMPLICATION OF NERVOUS SYSTEM IMPLANT        Preop Diagnosis (Codes):    Post op Diagnosis  Chronic pain      Premium Reason  Non-Premium    Comments:

## 2019-12-16 NOTE — OR NURSING
At 1445 dc criteria met, instructions reviewed w/pt and , questions answered.  Home with all belongings in stable condition.

## 2019-12-16 NOTE — OR NURSING
Pre op admission completed.  Pt educated on surgical plan of care, all questions answered.  Bed in low position and call light within reach.  Hourly rounding in place.  Pt has a continuous glucose monitoring disc on left bicep.

## 2019-12-16 NOTE — DISCHARGE INSTRUCTIONS
ACTIVITY: Rest and take it easy for the first 24 hours.  A responsible adult is recommended to remain with you during that time.  It is normal to feel sleepy.  We encourage you to not do anything that requires balance, judgment or coordination.    MILD FLU-LIKE SYMPTOMS ARE NORMAL. YOU MAY EXPERIENCE GENERALIZED MUSCLE ACHES, THROAT IRRITATION, HEADACHE AND/OR SOME NAUSEA.    FOR 24 HOURS DO NOT:  Drive, operate machinery or run household appliances.  Drink beer or alcoholic beverages.   Make important decisions or sign legal documents.    SPECIAL INSTRUCTIONS:     Keep incision clean and dry. Dressing can remain x5 days then remove. Can remove earlier if becomes soiled or falls off.   No required over incisions after.   OK to shower and pat dry.   Do not soak incision in bath, hot tub, etc. Until incisions fully healed  Do not lift anything over 10 pounds. No repetitive bending, lifting, twisting, movements.   No Aspirin or anticoagulants until cleared by Neurosurgery.   Take antibiotics for full 5 days.   Follow up with [facility] in 2 weeks as scheduled for removal of staples.      DIET: To avoid nausea, slowly advance diet as tolerated, avoiding spicy or greasy foods for the first day.  Add more substantial food to your diet according to your physician's instructions.  Babies can be fed formula or breast milk as soon as they are hungry.  INCREASE FLUIDS AND FIBER TO AVOID CONSTIPATION.    SURGICAL DRESSING/BATHING:   Keep incision clean and dry. Dressing can remain x5 days then remove. Can remove earlier if becomes soiled or falls off.   No required over incisions after.   OK to shower and pat dry.   Do not soak incision in bath, hot tub, etc. Until incisions fully healed      FOLLOW-UP APPOINTMENT:  A follow-up appointment should be arranged with your doctor in 2 weeks; call to schedule.    You should CALL YOUR PHYSICIAN if you develop:  Fever greater than 101 degrees F.  Pain not relieved by  medication, or persistent nausea or vomiting.  Excessive bleeding (blood soaking through dressing) or unexpected drainage from the wound.  Extreme redness or swelling around the incision site, drainage of pus or foul smelling drainage.  Inability to urinate or empty your bladder within 8 hours.  Problems with breathing or chest pain.    You should call 911 if you develop problems with breathing or chest pain.  If you are unable to contact your doctor or surgical center, you should go to the nearest emergency room or urgent care center.  Physician's telephone #: Dr. Roman [phone]    If any questions arise, call your doctor.  If your doctor is not available, please feel free to call the Surgical Center at [phone].  The Center is open Monday through Friday from 7AM to 7PM.  You can also call the [employer] HOTLINE open 24 hours/day, 7 days/week and speak to a nurse at [phone], or toll free at [phone].    A registered nurse may call you a few days after your surgery to see how you are doing after your procedure.    MEDICATIONS: Resume taking daily medication.  Take prescribed pain medication with food.  If no medication is prescribed, you may take non-aspirin pain medication if needed.  PAIN MEDICATION CAN BE VERY CONSTIPATING.  Take a stool softener or laxative such as senokot, pericolace, or milk of magnesia if needed.    Prescription given for Keflex (antibiotic).  Dr. Roman called a prescription.  Last pain medication given at 1:26.    If your physician has prescribed pain medication that includes Acetaminophen (Tylenol), do not take additional Acetaminophen (Tylenol) while taking the prescribed medication.    Depression / Suicide Risk    As you are discharged from this [facility] facility, it is important to learn how to keep safe from harming yourself.    Recognize the warning signs:  · Abrupt changes in personality, positive or negative- including increase in energy   · Giving away  possessions  · Change in eating patterns- significant weight changes-  positive or negative  · Change in sleeping patterns- unable to sleep or sleeping all the time   · Unwillingness or inability to communicate  · Depression  · Unusual sadness, discouragement and loneliness  · Talk of wanting to die  · Neglect of personal appearance   · Rebelliousness- reckless behavior  · Withdrawal from people/activities they love  · Confusion- inability to concentrate     If you or a loved one observes any of these behaviors or has concerns about self-harm, here's what you can do:  · Talk about it- your feelings and reasons for harming yourself  · Remove any means that you might use to hurt yourself (examples: pills, rope, extension cords, firearm)  · Get professional help from the community (Mental Health, Substance Abuse, psychological counseling)  · Do not be alone:Call your Safe Contact- someone whom you trust who will be there for you.  · Call your local CRISIS HOTLINE 978-2134 or 081-403-6343  · Call your local Children's Mobile Crisis Response Team [place] (776) 867-3255 or www.[employer]  · Call the toll free National Suicide Prevention Hotlines   · National Suicide Prevention Lifeline 365-814-PBZI (7195)  · National Hope Line Network 800-SUICIDE (571-3509)

## 2019-12-16 NOTE — PROGRESS NOTES
Med rec updated and complete  Allergies reviewed  Interviewed pt with  at bedside with permission from pt.  Pt reports no vitamins or OTC's.   Pt reports no antibiotics in the last 2 weeks

## 2019-12-16 NOTE — ANESTHESIA PREPROCEDURE EVALUATION
Relevant Problems   ENDO   (+) Diabetes mellitus type 1 (HCC)   (+) Hypothyroidism, postsurgical   (+) Type 1 diabetes mellitus with neurological manifestations, uncontrolled (HCC)       Physical Exam    Airway   Mallampati: II  TM distance: >3 FB  Neck ROM: full       Cardiovascular - normal exam  Rhythm: regular  Rate: normal  (-) murmur     Dental - normal exam         Pulmonary - normal exam  Breath sounds clear to auscultation     Abdominal    Neurological - normal exam                 Anesthesia Plan    ASA 3       Plan - general       Airway plan will be ETT        Induction: intravenous    Postoperative Plan: Postoperative administration of opioids is intended.    Pertinent diagnostic labs and testing reviewed    Informed Consent:    Anesthetic plan and risks discussed with patient.    Use of blood products discussed with: patient whom consented to blood products.

## 2019-12-26 ENCOUNTER — HOSPITAL ENCOUNTER (OUTPATIENT)
Dept: LAB | Facility: MEDICAL CENTER | Age: 64
End: 2019-12-26
Attending: NURSE PRACTITIONER
Payer: MEDICARE

## 2019-12-26 LAB
ALBUMIN SERPL BCP-MCNC: 3.7 G/DL (ref 3.2–4.9)
ALBUMIN/GLOB SERPL: 1.3 G/DL
ALP SERPL-CCNC: 133 U/L (ref 30–99)
ALT SERPL-CCNC: 33 U/L (ref 2–50)
ANION GAP SERPL CALC-SCNC: 9 MMOL/L (ref 0–11.9)
AST SERPL-CCNC: 29 U/L (ref 12–45)
BILIRUB SERPL-MCNC: 0.4 MG/DL (ref 0.1–1.5)
BUN SERPL-MCNC: 20 MG/DL (ref 8–22)
CALCIUM SERPL-MCNC: 8.5 MG/DL (ref 8.5–10.5)
CHLORIDE SERPL-SCNC: 105 MMOL/L (ref 96–112)
CHOLEST SERPL-MCNC: 134 MG/DL (ref 100–199)
CO2 SERPL-SCNC: 29 MMOL/L (ref 20–33)
CREAT SERPL-MCNC: 0.93 MG/DL (ref 0.5–1.4)
FASTING STATUS PATIENT QL REPORTED: NORMAL
GLOBULIN SER CALC-MCNC: 2.8 G/DL (ref 1.9–3.5)
GLUCOSE SERPL-MCNC: 111 MG/DL (ref 65–99)
HDLC SERPL-MCNC: 52 MG/DL
LDLC SERPL CALC-MCNC: 66 MG/DL
POTASSIUM SERPL-SCNC: 4.6 MMOL/L (ref 3.6–5.5)
PROT SERPL-MCNC: 6.5 G/DL (ref 6–8.2)
SODIUM SERPL-SCNC: 143 MMOL/L (ref 135–145)
TRIGL SERPL-MCNC: 79 MG/DL (ref 0–149)
TSH SERPL DL<=0.005 MIU/L-ACNC: 0.1 UIU/ML (ref 0.38–5.33)

## 2019-12-26 PROCEDURE — 84443 ASSAY THYROID STIM HORMONE: CPT

## 2019-12-26 PROCEDURE — 80061 LIPID PANEL: CPT

## 2019-12-26 PROCEDURE — 36415 COLL VENOUS BLD VENIPUNCTURE: CPT

## 2019-12-26 PROCEDURE — 80053 COMPREHEN METABOLIC PANEL: CPT

## 2020-01-07 ENCOUNTER — APPOINTMENT (OUTPATIENT)
Dept: ENDOCRINOLOGY | Facility: MEDICAL CENTER | Age: 65
End: 2020-01-07
Payer: MEDICARE

## 2020-01-18 ENCOUNTER — HOSPITAL ENCOUNTER (OUTPATIENT)
Facility: MEDICAL CENTER | Age: 65
DRG: 028 | End: 2020-01-18
Payer: MEDICARE

## 2020-01-18 ENCOUNTER — HOSPITAL ENCOUNTER (EMERGENCY)
Facility: MEDICAL CENTER | Age: 65
End: 2020-01-18
Attending: EMERGENCY MEDICINE
Payer: MEDICARE

## 2020-01-18 ENCOUNTER — HOSPITAL ENCOUNTER (INPATIENT)
Facility: MEDICAL CENTER | Age: 65
LOS: 12 days | DRG: 028 | End: 2020-01-30
Attending: HOSPITALIST | Admitting: HOSPITALIST
Payer: MEDICARE

## 2020-01-18 ENCOUNTER — APPOINTMENT (OUTPATIENT)
Dept: RADIOLOGY | Facility: MEDICAL CENTER | Age: 65
DRG: 028 | End: 2020-01-18
Attending: HOSPITALIST
Payer: MEDICARE

## 2020-01-18 VITALS
BODY MASS INDEX: 26.57 KG/M2 | HEART RATE: 108 BPM | RESPIRATION RATE: 20 BRPM | HEIGHT: 66 IN | WEIGHT: 165.34 LBS | OXYGEN SATURATION: 92 % | SYSTOLIC BLOOD PRESSURE: 153 MMHG | DIASTOLIC BLOOD PRESSURE: 64 MMHG | TEMPERATURE: 98.6 F

## 2020-01-18 DIAGNOSIS — T14.8XXA WOUND INFECTION: ICD-10-CM

## 2020-01-18 DIAGNOSIS — M48.061 SPINAL STENOSIS OF LUMBAR REGION, UNSPECIFIED WHETHER NEUROGENIC CLAUDICATION PRESENT: ICD-10-CM

## 2020-01-18 DIAGNOSIS — L08.9 WOUND INFECTION: ICD-10-CM

## 2020-01-18 PROBLEM — Z72.0 TOBACCO ABUSE: Status: ACTIVE | Noted: 2020-01-18

## 2020-01-18 PROBLEM — T81.49XA POST-OPERATIVE WOUND ABSCESS: Status: ACTIVE | Noted: 2020-01-18

## 2020-01-18 LAB
ALBUMIN SERPL BCP-MCNC: 3.8 G/DL (ref 3.2–4.9)
ALBUMIN/GLOB SERPL: 1.3 G/DL
ALP SERPL-CCNC: 116 U/L (ref 30–99)
ALT SERPL-CCNC: 14 U/L (ref 2–50)
ANION GAP SERPL CALC-SCNC: 13 MMOL/L (ref 0–11.9)
AST SERPL-CCNC: 12 U/L (ref 12–45)
BASOPHILS # BLD AUTO: 0.5 % (ref 0–1.8)
BASOPHILS # BLD: 0.07 K/UL (ref 0–0.12)
BILIRUB SERPL-MCNC: 0.3 MG/DL (ref 0.1–1.5)
BUN SERPL-MCNC: 12 MG/DL (ref 8–22)
CALCIUM SERPL-MCNC: 8.6 MG/DL (ref 8.4–10.2)
CHLORIDE SERPL-SCNC: 99 MMOL/L (ref 96–112)
CO2 SERPL-SCNC: 24 MMOL/L (ref 20–33)
CREAT SERPL-MCNC: 0.79 MG/DL (ref 0.5–1.4)
EOSINOPHIL # BLD AUTO: 0.03 K/UL (ref 0–0.51)
EOSINOPHIL NFR BLD: 0.2 % (ref 0–6.9)
ERYTHROCYTE [DISTWIDTH] IN BLOOD BY AUTOMATED COUNT: 41.6 FL (ref 35.9–50)
GLOBULIN SER CALC-MCNC: 2.9 G/DL (ref 1.9–3.5)
GLUCOSE SERPL-MCNC: 321 MG/DL (ref 65–99)
HCT VFR BLD AUTO: 38.3 % (ref 37–47)
HGB BLD-MCNC: 12.7 G/DL (ref 12–16)
IMM GRANULOCYTES # BLD AUTO: 0.07 K/UL (ref 0–0.11)
IMM GRANULOCYTES NFR BLD AUTO: 0.5 % (ref 0–0.9)
LACTATE BLD-SCNC: 0.6 MMOL/L (ref 0.5–2)
LACTATE BLD-SCNC: 1.7 MMOL/L (ref 0.5–2)
LYMPHOCYTES # BLD AUTO: 0.87 K/UL (ref 1–4.8)
LYMPHOCYTES NFR BLD: 6.2 % (ref 22–41)
MCH RBC QN AUTO: 30.5 PG (ref 27–33)
MCHC RBC AUTO-ENTMCNC: 33.2 G/DL (ref 33.6–35)
MCV RBC AUTO: 92.1 FL (ref 81.4–97.8)
MONOCYTES # BLD AUTO: 1.34 K/UL (ref 0–0.85)
MONOCYTES NFR BLD AUTO: 9.6 % (ref 0–13.4)
NEUTROPHILS # BLD AUTO: 11.57 K/UL (ref 2–7.15)
NEUTROPHILS NFR BLD: 83 % (ref 44–72)
NRBC # BLD AUTO: 0 K/UL
NRBC BLD-RTO: 0 /100 WBC
PLATELET # BLD AUTO: 257 K/UL (ref 164–446)
PMV BLD AUTO: 9.7 FL (ref 9–12.9)
POTASSIUM SERPL-SCNC: 4.1 MMOL/L (ref 3.6–5.5)
PROT SERPL-MCNC: 6.7 G/DL (ref 6–8.2)
RBC # BLD AUTO: 4.16 M/UL (ref 4.2–5.4)
SODIUM SERPL-SCNC: 136 MMOL/L (ref 135–145)
WBC # BLD AUTO: 14 K/UL (ref 4.8–10.8)

## 2020-01-18 PROCEDURE — 36415 COLL VENOUS BLD VENIPUNCTURE: CPT

## 2020-01-18 PROCEDURE — 96366 THER/PROPH/DIAG IV INF ADDON: CPT

## 2020-01-18 PROCEDURE — 99406 BEHAV CHNG SMOKING 3-10 MIN: CPT | Performed by: HOSPITALIST

## 2020-01-18 PROCEDURE — 700102 HCHG RX REV CODE 250 W/ 637 OVERRIDE(OP): Performed by: HOSPITALIST

## 2020-01-18 PROCEDURE — 83605 ASSAY OF LACTIC ACID: CPT | Mod: 91

## 2020-01-18 PROCEDURE — 770001 HCHG ROOM/CARE - MED/SURG/GYN PRIV*

## 2020-01-18 PROCEDURE — 96375 TX/PRO/DX INJ NEW DRUG ADDON: CPT

## 2020-01-18 PROCEDURE — 96367 TX/PROPH/DG ADDL SEQ IV INF: CPT

## 2020-01-18 PROCEDURE — 700111 HCHG RX REV CODE 636 W/ 250 OVERRIDE (IP): Performed by: HOSPITALIST

## 2020-01-18 PROCEDURE — 87040 BLOOD CULTURE FOR BACTERIA: CPT | Mod: 91

## 2020-01-18 PROCEDURE — 80053 COMPREHEN METABOLIC PANEL: CPT

## 2020-01-18 PROCEDURE — 99285 EMERGENCY DEPT VISIT HI MDM: CPT | Mod: 25 | Performed by: HOSPITALIST

## 2020-01-18 PROCEDURE — 96365 THER/PROPH/DIAG IV INF INIT: CPT

## 2020-01-18 PROCEDURE — 99284 EMERGENCY DEPT VISIT MOD MDM: CPT

## 2020-01-18 PROCEDURE — A9270 NON-COVERED ITEM OR SERVICE: HCPCS | Performed by: HOSPITALIST

## 2020-01-18 PROCEDURE — 700105 HCHG RX REV CODE 258: Performed by: HOSPITALIST

## 2020-01-18 PROCEDURE — 83605 ASSAY OF LACTIC ACID: CPT

## 2020-01-18 PROCEDURE — 85025 COMPLETE CBC W/AUTO DIFF WBC: CPT

## 2020-01-18 PROCEDURE — 700105 HCHG RX REV CODE 258: Performed by: EMERGENCY MEDICINE

## 2020-01-18 PROCEDURE — 700111 HCHG RX REV CODE 636 W/ 250 OVERRIDE (IP): Performed by: EMERGENCY MEDICINE

## 2020-01-18 PROCEDURE — 96376 TX/PRO/DX INJ SAME DRUG ADON: CPT

## 2020-01-18 RX ORDER — LOSARTAN POTASSIUM 100 MG/1
100 TABLET ORAL DAILY
Status: CANCELLED | OUTPATIENT
Start: 2020-01-18

## 2020-01-18 RX ORDER — ONDANSETRON 4 MG/1
4 TABLET, ORALLY DISINTEGRATING ORAL EVERY 4 HOURS PRN
Status: DISCONTINUED | OUTPATIENT
Start: 2020-01-18 | End: 2020-01-30 | Stop reason: HOSPADM

## 2020-01-18 RX ORDER — POLYETHYLENE GLYCOL 3350 17 G/17G
1 POWDER, FOR SOLUTION ORAL
Status: CANCELLED | OUTPATIENT
Start: 2020-01-18

## 2020-01-18 RX ORDER — POLYETHYLENE GLYCOL 3350 17 G/17G
1 POWDER, FOR SOLUTION ORAL
Status: DISCONTINUED | OUTPATIENT
Start: 2020-01-18 | End: 2020-01-18 | Stop reason: HOSPADM

## 2020-01-18 RX ORDER — ONDANSETRON 2 MG/ML
4 INJECTION INTRAMUSCULAR; INTRAVENOUS EVERY 4 HOURS PRN
Status: DISCONTINUED | OUTPATIENT
Start: 2020-01-18 | End: 2020-01-30 | Stop reason: HOSPADM

## 2020-01-18 RX ORDER — BISACODYL 10 MG
10 SUPPOSITORY, RECTAL RECTAL
Status: DISCONTINUED | OUTPATIENT
Start: 2020-01-18 | End: 2020-01-18 | Stop reason: HOSPADM

## 2020-01-18 RX ORDER — BISACODYL 10 MG
10 SUPPOSITORY, RECTAL RECTAL
Status: CANCELLED | OUTPATIENT
Start: 2020-01-18

## 2020-01-18 RX ORDER — PROCHLORPERAZINE EDISYLATE 5 MG/ML
5-10 INJECTION INTRAMUSCULAR; INTRAVENOUS EVERY 4 HOURS PRN
Status: DISCONTINUED | OUTPATIENT
Start: 2020-01-18 | End: 2020-01-30 | Stop reason: HOSPADM

## 2020-01-18 RX ORDER — LOSARTAN POTASSIUM 100 MG/1
100 TABLET ORAL DAILY
Status: DISCONTINUED | OUTPATIENT
Start: 2020-01-18 | End: 2020-01-18 | Stop reason: HOSPADM

## 2020-01-18 RX ORDER — PROMETHAZINE HYDROCHLORIDE 25 MG/1
12.5-25 TABLET ORAL EVERY 4 HOURS PRN
Status: CANCELLED | OUTPATIENT
Start: 2020-01-18

## 2020-01-18 RX ORDER — AMOXICILLIN 250 MG
2 CAPSULE ORAL 2 TIMES DAILY
Status: CANCELLED | OUTPATIENT
Start: 2020-01-18

## 2020-01-18 RX ORDER — LOSARTAN POTASSIUM 100 MG/1
100 TABLET ORAL DAILY
Status: ON HOLD | COMMUNITY
End: 2020-01-27

## 2020-01-18 RX ORDER — PROMETHAZINE HYDROCHLORIDE 25 MG/1
12.5-25 SUPPOSITORY RECTAL EVERY 4 HOURS PRN
Status: CANCELLED | OUTPATIENT
Start: 2020-01-18

## 2020-01-18 RX ORDER — ATORVASTATIN CALCIUM 10 MG/1
10 TABLET, FILM COATED ORAL NIGHTLY
Status: DISCONTINUED | OUTPATIENT
Start: 2020-01-18 | End: 2020-01-18 | Stop reason: HOSPADM

## 2020-01-18 RX ORDER — PROMETHAZINE HYDROCHLORIDE 25 MG/1
12.5-25 SUPPOSITORY RECTAL EVERY 4 HOURS PRN
Status: DISCONTINUED | OUTPATIENT
Start: 2020-01-18 | End: 2020-01-18 | Stop reason: HOSPADM

## 2020-01-18 RX ORDER — POLYETHYLENE GLYCOL 3350 17 G/17G
1 POWDER, FOR SOLUTION ORAL
Status: DISCONTINUED | OUTPATIENT
Start: 2020-01-18 | End: 2020-01-30 | Stop reason: HOSPADM

## 2020-01-18 RX ORDER — LOSARTAN POTASSIUM 50 MG/1
100 TABLET ORAL DAILY
Status: DISCONTINUED | OUTPATIENT
Start: 2020-01-19 | End: 2020-01-20

## 2020-01-18 RX ORDER — AMOXICILLIN 250 MG
2 CAPSULE ORAL 2 TIMES DAILY
Status: DISCONTINUED | OUTPATIENT
Start: 2020-01-18 | End: 2020-01-18 | Stop reason: HOSPADM

## 2020-01-18 RX ORDER — PROMETHAZINE HYDROCHLORIDE 25 MG/1
12.5-25 SUPPOSITORY RECTAL EVERY 4 HOURS PRN
Status: DISCONTINUED | OUTPATIENT
Start: 2020-01-18 | End: 2020-01-30 | Stop reason: HOSPADM

## 2020-01-18 RX ORDER — PROCHLORPERAZINE EDISYLATE 5 MG/ML
5-10 INJECTION INTRAMUSCULAR; INTRAVENOUS EVERY 4 HOURS PRN
Status: CANCELLED | OUTPATIENT
Start: 2020-01-18

## 2020-01-18 RX ORDER — SODIUM CHLORIDE 9 MG/ML
1000 INJECTION, SOLUTION INTRAVENOUS ONCE
Status: COMPLETED | OUTPATIENT
Start: 2020-01-18 | End: 2020-01-18

## 2020-01-18 RX ORDER — ATORVASTATIN CALCIUM 10 MG/1
10 TABLET, FILM COATED ORAL NIGHTLY
Status: DISCONTINUED | OUTPATIENT
Start: 2020-01-18 | End: 2020-01-30 | Stop reason: HOSPADM

## 2020-01-18 RX ORDER — ATORVASTATIN CALCIUM 10 MG/1
10 TABLET, FILM COATED ORAL NIGHTLY
Status: CANCELLED | OUTPATIENT
Start: 2020-01-18

## 2020-01-18 RX ORDER — PROCHLORPERAZINE EDISYLATE 5 MG/ML
5-10 INJECTION INTRAMUSCULAR; INTRAVENOUS EVERY 4 HOURS PRN
Status: DISCONTINUED | OUTPATIENT
Start: 2020-01-18 | End: 2020-01-18 | Stop reason: HOSPADM

## 2020-01-18 RX ORDER — MORPHINE SULFATE 4 MG/ML
4 INJECTION, SOLUTION INTRAMUSCULAR; INTRAVENOUS ONCE
Status: COMPLETED | OUTPATIENT
Start: 2020-01-18 | End: 2020-01-18

## 2020-01-18 RX ORDER — AMOXICILLIN 250 MG
2 CAPSULE ORAL 2 TIMES DAILY
Status: DISCONTINUED | OUTPATIENT
Start: 2020-01-19 | End: 2020-01-30 | Stop reason: HOSPADM

## 2020-01-18 RX ORDER — ONDANSETRON 4 MG/1
4 TABLET, ORALLY DISINTEGRATING ORAL EVERY 4 HOURS PRN
Status: DISCONTINUED | OUTPATIENT
Start: 2020-01-18 | End: 2020-01-18 | Stop reason: HOSPADM

## 2020-01-18 RX ORDER — PROMETHAZINE HYDROCHLORIDE 25 MG/1
12.5-25 TABLET ORAL EVERY 4 HOURS PRN
Status: DISCONTINUED | OUTPATIENT
Start: 2020-01-18 | End: 2020-01-18 | Stop reason: HOSPADM

## 2020-01-18 RX ORDER — INSULIN GLARGINE 100 [IU]/ML
24 INJECTION, SOLUTION SUBCUTANEOUS
Status: DISCONTINUED | OUTPATIENT
Start: 2020-01-18 | End: 2020-01-22

## 2020-01-18 RX ORDER — MORPHINE SULFATE 4 MG/ML
4 INJECTION, SOLUTION INTRAMUSCULAR; INTRAVENOUS ONCE
Status: DISCONTINUED | OUTPATIENT
Start: 2020-01-18 | End: 2020-01-18

## 2020-01-18 RX ORDER — ONDANSETRON 2 MG/ML
4 INJECTION INTRAMUSCULAR; INTRAVENOUS EVERY 4 HOURS PRN
Status: CANCELLED | OUTPATIENT
Start: 2020-01-18

## 2020-01-18 RX ORDER — CEPHALEXIN 500 MG/1
1000 CAPSULE ORAL 2 TIMES DAILY
Status: ON HOLD | COMMUNITY
Start: 2019-12-30 | End: 2020-01-27

## 2020-01-18 RX ORDER — ONDANSETRON 2 MG/ML
4 INJECTION INTRAMUSCULAR; INTRAVENOUS EVERY 4 HOURS PRN
Status: DISCONTINUED | OUTPATIENT
Start: 2020-01-18 | End: 2020-01-18 | Stop reason: HOSPADM

## 2020-01-18 RX ORDER — ONDANSETRON 4 MG/1
4 TABLET, ORALLY DISINTEGRATING ORAL EVERY 4 HOURS PRN
Status: CANCELLED | OUTPATIENT
Start: 2020-01-18

## 2020-01-18 RX ORDER — MORPHINE SULFATE 4 MG/ML
2 INJECTION, SOLUTION INTRAMUSCULAR; INTRAVENOUS ONCE
Status: COMPLETED | OUTPATIENT
Start: 2020-01-18 | End: 2020-01-18

## 2020-01-18 RX ORDER — BISACODYL 10 MG
10 SUPPOSITORY, RECTAL RECTAL
Status: DISCONTINUED | OUTPATIENT
Start: 2020-01-18 | End: 2020-01-30 | Stop reason: HOSPADM

## 2020-01-18 RX ORDER — PROMETHAZINE HYDROCHLORIDE 25 MG/1
12.5-25 TABLET ORAL EVERY 4 HOURS PRN
Status: DISCONTINUED | OUTPATIENT
Start: 2020-01-18 | End: 2020-01-30 | Stop reason: HOSPADM

## 2020-01-18 RX ORDER — MORPHINE SULFATE 4 MG/ML
2 INJECTION, SOLUTION INTRAMUSCULAR; INTRAVENOUS EVERY 4 HOURS PRN
Status: DISCONTINUED | OUTPATIENT
Start: 2020-01-18 | End: 2020-01-19

## 2020-01-18 RX ADMIN — PIPERACILLIN AND TAZOBACTAM 3.38 G: 3; .375 INJECTION, POWDER, LYOPHILIZED, FOR SOLUTION INTRAVENOUS; PARENTERAL at 16:40

## 2020-01-18 RX ADMIN — MORPHINE SULFATE 2 MG: 4 INJECTION INTRAVENOUS at 17:27

## 2020-01-18 RX ADMIN — VANCOMYCIN HYDROCHLORIDE 2000 MG: 500 INJECTION, POWDER, LYOPHILIZED, FOR SOLUTION INTRAVENOUS at 17:28

## 2020-01-18 RX ADMIN — ATORVASTATIN CALCIUM 10 MG: 10 TABLET, FILM COATED ORAL at 21:08

## 2020-01-18 RX ADMIN — INSULIN GLARGINE 24 UNITS: 100 INJECTION, SOLUTION SUBCUTANEOUS at 21:11

## 2020-01-18 RX ADMIN — INSULIN HUMAN 2 UNITS: 100 INJECTION, SOLUTION PARENTERAL at 21:11

## 2020-01-18 RX ADMIN — PIPERACILLIN AND TAZOBACTAM 4.5 G: 4; .5 INJECTION, POWDER, LYOPHILIZED, FOR SOLUTION INTRAVENOUS; PARENTERAL at 21:18

## 2020-01-18 RX ADMIN — MORPHINE SULFATE 2 MG: 4 INJECTION INTRAVENOUS at 21:09

## 2020-01-18 RX ADMIN — MORPHINE SULFATE 4 MG: 4 INJECTION INTRAVENOUS at 15:47

## 2020-01-18 RX ADMIN — SODIUM CHLORIDE 1000 ML: 9 INJECTION, SOLUTION INTRAVENOUS at 15:47

## 2020-01-18 ASSESSMENT — ENCOUNTER SYMPTOMS
COUGH: 0
PHOTOPHOBIA: 0
MYALGIAS: 0
DIZZINESS: 0
BACK PAIN: 0
HEADACHES: 0
CONSTIPATION: 0
NERVOUS/ANXIOUS: 1
STRIDOR: 0
SPEECH CHANGE: 0
PALPITATIONS: 0
HEMOPTYSIS: 0
SORE THROAT: 0
VOMITING: 0
DOUBLE VISION: 0
WEAKNESS: 0
ORTHOPNEA: 0
BLOOD IN STOOL: 0
EYE PAIN: 0
SENSORY CHANGE: 0
MEMORY LOSS: 0
HEARTBURN: 0
PND: 0
DEPRESSION: 0
SHORTNESS OF BREATH: 0
CLAUDICATION: 0
SPUTUM PRODUCTION: 0
TREMORS: 0
FEVER: 0
NAUSEA: 0
TINGLING: 0
CHILLS: 0
BLURRED VISION: 0
NECK PAIN: 0

## 2020-01-18 ASSESSMENT — LIFESTYLE VARIABLES
ALCOHOL_USE: NO
EVER_SMOKED: YES
CONSUMPTION TOTAL: NEGATIVE
EVER FELT BAD OR GUILTY ABOUT YOUR DRINKING: NO
TOTAL SCORE: 0
ON A TYPICAL DAY WHEN YOU DRINK ALCOHOL HOW MANY DRINKS DO YOU HAVE: 0
TOTAL SCORE: 0
EVER HAD A DRINK FIRST THING IN THE MORNING TO STEADY YOUR NERVES TO GET RID OF A HANGOVER: NO
TOTAL SCORE: 0
HAVE YOU EVER FELT YOU SHOULD CUT DOWN ON YOUR DRINKING: NO
AVERAGE NUMBER OF DAYS PER WEEK YOU HAVE A DRINK CONTAINING ALCOHOL: 0
HOW MANY TIMES IN THE PAST YEAR HAVE YOU HAD 5 OR MORE DRINKS IN A DAY: 0
DOES PATIENT WANT TO STOP DRINKING: NO
EVER_SMOKED: YES
HAVE PEOPLE ANNOYED YOU BY CRITICIZING YOUR DRINKING: NO

## 2020-01-18 ASSESSMENT — COGNITIVE AND FUNCTIONAL STATUS - GENERAL
SUGGESTED CMS G CODE MODIFIER MOBILITY: CH
SUGGESTED CMS G CODE MODIFIER DAILY ACTIVITY: CH
MOBILITY SCORE: 24
DAILY ACTIVITIY SCORE: 24

## 2020-01-18 ASSESSMENT — COPD QUESTIONNAIRES
DO YOU EVER COUGH UP ANY MUCUS OR PHLEGM?: NO/ONLY WITH OCCASIONAL COLDS OR INFECTIONS
HAVE YOU SMOKED AT LEAST 100 CIGARETTES IN YOUR ENTIRE LIFE: YES
DURING THE PAST 4 WEEKS HOW MUCH DID YOU FEEL SHORT OF BREATH: NONE/LITTLE OF THE TIME
COPD SCREENING SCORE: 4

## 2020-01-18 ASSESSMENT — PATIENT HEALTH QUESTIONNAIRE - PHQ9
2. FEELING DOWN, DEPRESSED, IRRITABLE, OR HOPELESS: NOT AT ALL
SUM OF ALL RESPONSES TO PHQ9 QUESTIONS 1 AND 2: 0
1. LITTLE INTEREST OR PLEASURE IN DOING THINGS: NOT AT ALL

## 2020-01-18 NOTE — ED NOTES
Med rec completed per pt and home pharmacy ([facility])  Allergies reviewed    Pt completed a 5 day course of Keflex on 1/4/2020

## 2020-01-18 NOTE — ED TRIAGE NOTES
"Pt reports a recent exploration at thoracic 8-9 (12/16/2019) with replacement of neuro stimulator leads, to address chronic back pain.  She presents complaining of worsening symptoms, and recurring infections at surgical site.  She has completed 2 bouts of antibiotics.    Chief Complaint   Patient presents with   • Pain   • Wound Infection   • Back Pain     /75   Pulse (!) 110   Temp 37 °C (98.6 °F) (Temporal)   Resp 20   Ht 1.676 m (5' 6\")   Wt 75 kg (165 lb 5.5 oz)   LMP 04/29/2005 (LMP Unknown)   SpO2 90%   BMI 26.69 kg/m²     "

## 2020-01-19 ENCOUNTER — ANESTHESIA EVENT (OUTPATIENT)
Dept: SURGERY | Facility: MEDICAL CENTER | Age: 65
DRG: 028 | End: 2020-01-19
Payer: MEDICARE

## 2020-01-19 ENCOUNTER — ANESTHESIA (OUTPATIENT)
Dept: SURGERY | Facility: MEDICAL CENTER | Age: 65
DRG: 028 | End: 2020-01-19
Payer: MEDICARE

## 2020-01-19 LAB
ALBUMIN SERPL BCP-MCNC: 3.1 G/DL (ref 3.2–4.9)
ALBUMIN/GLOB SERPL: 1.2 G/DL
ALP SERPL-CCNC: 90 U/L (ref 30–99)
ALT SERPL-CCNC: 12 U/L (ref 2–50)
ANION GAP SERPL CALC-SCNC: 6 MMOL/L (ref 0–11.9)
APTT PPP: 30.2 SEC (ref 24.7–36)
AST SERPL-CCNC: 11 U/L (ref 12–45)
BASOPHILS # BLD AUTO: 0.7 % (ref 0–1.8)
BASOPHILS # BLD: 0.11 K/UL (ref 0–0.12)
BILIRUB SERPL-MCNC: 0.7 MG/DL (ref 0.1–1.5)
BUN SERPL-MCNC: 12 MG/DL (ref 8–22)
CALCIUM SERPL-MCNC: 8.4 MG/DL (ref 8.5–10.5)
CHLORIDE SERPL-SCNC: 104 MMOL/L (ref 96–112)
CO2 SERPL-SCNC: 27 MMOL/L (ref 20–33)
CREAT SERPL-MCNC: 0.99 MG/DL (ref 0.5–1.4)
EOSINOPHIL # BLD AUTO: 0.07 K/UL (ref 0–0.51)
EOSINOPHIL NFR BLD: 0.5 % (ref 0–6.9)
ERYTHROCYTE [DISTWIDTH] IN BLOOD BY AUTOMATED COUNT: 44.1 FL (ref 35.9–50)
GLOBULIN SER CALC-MCNC: 2.5 G/DL (ref 1.9–3.5)
GLUCOSE BLD-MCNC: 105 MG/DL (ref 65–99)
GLUCOSE BLD-MCNC: 125 MG/DL (ref 65–99)
GLUCOSE BLD-MCNC: 154 MG/DL (ref 65–99)
GLUCOSE BLD-MCNC: 77 MG/DL (ref 65–99)
GLUCOSE BLD-MCNC: 90 MG/DL (ref 65–99)
GLUCOSE SERPL-MCNC: 137 MG/DL (ref 65–99)
HCT VFR BLD AUTO: 36.1 % (ref 37–47)
HGB BLD-MCNC: 11.4 G/DL (ref 12–16)
IMM GRANULOCYTES # BLD AUTO: 0.07 K/UL (ref 0–0.11)
IMM GRANULOCYTES NFR BLD AUTO: 0.5 % (ref 0–0.9)
INR PPP: 1.06 (ref 0.87–1.13)
LACTATE BLD-SCNC: 0.7 MMOL/L (ref 0.5–2)
LACTATE BLD-SCNC: 1.4 MMOL/L (ref 0.5–2)
LYMPHOCYTES # BLD AUTO: 0.97 K/UL (ref 1–4.8)
LYMPHOCYTES NFR BLD: 6.5 % (ref 22–41)
MCH RBC QN AUTO: 30.4 PG (ref 27–33)
MCHC RBC AUTO-ENTMCNC: 31.6 G/DL (ref 33.6–35)
MCV RBC AUTO: 96.3 FL (ref 81.4–97.8)
MONOCYTES # BLD AUTO: 1.74 K/UL (ref 0–0.85)
MONOCYTES NFR BLD AUTO: 11.7 % (ref 0–13.4)
NEUTROPHILS # BLD AUTO: 11.87 K/UL (ref 2–7.15)
NEUTROPHILS NFR BLD: 80.1 % (ref 44–72)
NRBC # BLD AUTO: 0 K/UL
NRBC BLD-RTO: 0 /100 WBC
PLATELET # BLD AUTO: 225 K/UL (ref 164–446)
PMV BLD AUTO: 9.3 FL (ref 9–12.9)
POTASSIUM SERPL-SCNC: 4.6 MMOL/L (ref 3.6–5.5)
PROT SERPL-MCNC: 5.6 G/DL (ref 6–8.2)
PROTHROMBIN TIME: 14 SEC (ref 12–14.6)
RBC # BLD AUTO: 3.75 M/UL (ref 4.2–5.4)
SODIUM SERPL-SCNC: 137 MMOL/L (ref 135–145)
WBC # BLD AUTO: 14.8 K/UL (ref 4.8–10.8)

## 2020-01-19 PROCEDURE — 160029 HCHG SURGERY MINUTES - 1ST 30 MINS LEVEL 4: Performed by: NEUROLOGICAL SURGERY

## 2020-01-19 PROCEDURE — 700111 HCHG RX REV CODE 636 W/ 250 OVERRIDE (IP): Performed by: HOSPITALIST

## 2020-01-19 PROCEDURE — A9270 NON-COVERED ITEM OR SERVICE: HCPCS | Performed by: HOSPITALIST

## 2020-01-19 PROCEDURE — 160035 HCHG PACU - 1ST 60 MINS PHASE I: Performed by: NEUROLOGICAL SURGERY

## 2020-01-19 PROCEDURE — 700111 HCHG RX REV CODE 636 W/ 250 OVERRIDE (IP)

## 2020-01-19 PROCEDURE — 87075 CULTR BACTERIA EXCEPT BLOOD: CPT

## 2020-01-19 PROCEDURE — 85610 PROTHROMBIN TIME: CPT

## 2020-01-19 PROCEDURE — 0J970ZZ DRAINAGE OF BACK SUBCUTANEOUS TISSUE AND FASCIA, OPEN APPROACH: ICD-10-PCS | Performed by: NEUROLOGICAL SURGERY

## 2020-01-19 PROCEDURE — 160002 HCHG RECOVERY MINUTES (STAT): Performed by: NEUROLOGICAL SURGERY

## 2020-01-19 PROCEDURE — 700102 HCHG RX REV CODE 250 W/ 637 OVERRIDE(OP): Performed by: NEUROLOGICAL SURGERY

## 2020-01-19 PROCEDURE — 87205 SMEAR GRAM STAIN: CPT | Mod: 91

## 2020-01-19 PROCEDURE — 80053 COMPREHEN METABOLIC PANEL: CPT

## 2020-01-19 PROCEDURE — 700105 HCHG RX REV CODE 258: Performed by: HOSPITALIST

## 2020-01-19 PROCEDURE — 85025 COMPLETE CBC W/AUTO DIFF WBC: CPT

## 2020-01-19 PROCEDURE — 700101 HCHG RX REV CODE 250: Performed by: ANESTHESIOLOGY

## 2020-01-19 PROCEDURE — 700111 HCHG RX REV CODE 636 W/ 250 OVERRIDE (IP): Performed by: INTERNAL MEDICINE

## 2020-01-19 PROCEDURE — 83605 ASSAY OF LACTIC ACID: CPT

## 2020-01-19 PROCEDURE — 501838 HCHG SUTURE GENERAL: Performed by: NEUROLOGICAL SURGERY

## 2020-01-19 PROCEDURE — 500371 HCHG DRAIN, BLAKE 10MM: Performed by: NEUROLOGICAL SURGERY

## 2020-01-19 PROCEDURE — 700102 HCHG RX REV CODE 250 W/ 637 OVERRIDE(OP): Performed by: HOSPITALIST

## 2020-01-19 PROCEDURE — 82962 GLUCOSE BLOOD TEST: CPT | Mod: 91

## 2020-01-19 PROCEDURE — 87077 CULTURE AEROBIC IDENTIFY: CPT

## 2020-01-19 PROCEDURE — 36415 COLL VENOUS BLD VENIPUNCTURE: CPT

## 2020-01-19 PROCEDURE — 87070 CULTURE OTHR SPECIMN AEROBIC: CPT | Mod: 91

## 2020-01-19 PROCEDURE — 770001 HCHG ROOM/CARE - MED/SURG/GYN PRIV*

## 2020-01-19 PROCEDURE — 00PU0MZ REMOVAL OF NEUROSTIMULATOR LEAD FROM SPINAL CANAL, OPEN APPROACH: ICD-10-PCS | Performed by: NEUROLOGICAL SURGERY

## 2020-01-19 PROCEDURE — 99223 1ST HOSP IP/OBS HIGH 75: CPT | Performed by: INTERNAL MEDICINE

## 2020-01-19 PROCEDURE — 160041 HCHG SURGERY MINUTES - EA ADDL 1 MIN LEVEL 4: Performed by: NEUROLOGICAL SURGERY

## 2020-01-19 PROCEDURE — 700101 HCHG RX REV CODE 250: Performed by: NEUROLOGICAL SURGERY

## 2020-01-19 PROCEDURE — 160009 HCHG ANES TIME/MIN: Performed by: NEUROLOGICAL SURGERY

## 2020-01-19 PROCEDURE — 85730 THROMBOPLASTIN TIME PARTIAL: CPT

## 2020-01-19 PROCEDURE — 87186 SC STD MICRODIL/AGAR DIL: CPT

## 2020-01-19 PROCEDURE — 700106 HCHG RX REV CODE 271: Performed by: NEUROLOGICAL SURGERY

## 2020-01-19 PROCEDURE — 700105 HCHG RX REV CODE 258: Performed by: INTERNAL MEDICINE

## 2020-01-19 PROCEDURE — 700105 HCHG RX REV CODE 258

## 2020-01-19 PROCEDURE — A9270 NON-COVERED ITEM OR SERVICE: HCPCS | Performed by: NEUROLOGICAL SURGERY

## 2020-01-19 PROCEDURE — 87015 SPECIMEN INFECT AGNT CONCNTJ: CPT

## 2020-01-19 PROCEDURE — 160036 HCHG PACU - EA ADDL 30 MINS PHASE I: Performed by: NEUROLOGICAL SURGERY

## 2020-01-19 PROCEDURE — 99232 SBSQ HOSP IP/OBS MODERATE 35: CPT | Performed by: INTERNAL MEDICINE

## 2020-01-19 PROCEDURE — 0JPT0MZ REMOVAL OF STIMULATOR GENERATOR FROM TRUNK SUBCUTANEOUS TISSUE AND FASCIA, OPEN APPROACH: ICD-10-PCS | Performed by: NEUROLOGICAL SURGERY

## 2020-01-19 PROCEDURE — 160048 HCHG OR STATISTICAL LEVEL 1-5: Performed by: NEUROLOGICAL SURGERY

## 2020-01-19 PROCEDURE — 700111 HCHG RX REV CODE 636 W/ 250 OVERRIDE (IP): Performed by: ANESTHESIOLOGY

## 2020-01-19 PROCEDURE — 500389 HCHG DRAIN, RESERVOIR SUCT JP 100CC: Performed by: NEUROLOGICAL SURGERY

## 2020-01-19 PROCEDURE — 700111 HCHG RX REV CODE 636 W/ 250 OVERRIDE (IP): Performed by: NEUROLOGICAL SURGERY

## 2020-01-19 RX ORDER — VANCOMYCIN HYDROCHLORIDE 1 G/20ML
INJECTION, POWDER, LYOPHILIZED, FOR SOLUTION INTRAVENOUS
Status: COMPLETED | OUTPATIENT
Start: 2020-01-19 | End: 2020-01-19

## 2020-01-19 RX ORDER — HALOPERIDOL 5 MG/ML
INJECTION INTRAMUSCULAR
Status: COMPLETED
Start: 2020-01-19 | End: 2020-01-19

## 2020-01-19 RX ORDER — ROCURONIUM BROMIDE 10 MG/ML
INJECTION, SOLUTION INTRAVENOUS PRN
Status: DISCONTINUED | OUTPATIENT
Start: 2020-01-19 | End: 2020-01-19 | Stop reason: SURG

## 2020-01-19 RX ORDER — BACITRACIN 50000 [IU]/1
INJECTION, POWDER, FOR SOLUTION INTRAMUSCULAR
Status: DISCONTINUED | OUTPATIENT
Start: 2020-01-19 | End: 2020-01-19 | Stop reason: HOSPADM

## 2020-01-19 RX ORDER — ONDANSETRON 2 MG/ML
INJECTION INTRAMUSCULAR; INTRAVENOUS PRN
Status: DISCONTINUED | OUTPATIENT
Start: 2020-01-19 | End: 2020-01-19 | Stop reason: SURG

## 2020-01-19 RX ORDER — SUCCINYLCHOLINE/SOD CL,ISO/PF 200MG/10ML
SYRINGE (ML) INTRAVENOUS PRN
Status: DISCONTINUED | OUTPATIENT
Start: 2020-01-19 | End: 2020-01-19 | Stop reason: SURG

## 2020-01-19 RX ORDER — LIDOCAINE HYDROCHLORIDE 40 MG/ML
SOLUTION TOPICAL PRN
Status: DISCONTINUED | OUTPATIENT
Start: 2020-01-19 | End: 2020-01-19 | Stop reason: SURG

## 2020-01-19 RX ORDER — HALOPERIDOL 5 MG/ML
1 INJECTION INTRAMUSCULAR
Status: DISCONTINUED | OUTPATIENT
Start: 2020-01-19 | End: 2020-01-19 | Stop reason: HOSPADM

## 2020-01-19 RX ORDER — SODIUM CHLORIDE, SODIUM LACTATE, POTASSIUM CHLORIDE, AND CALCIUM CHLORIDE .6; .31; .03; .02 G/100ML; G/100ML; G/100ML; G/100ML
IRRIGANT IRRIGATION
Status: DISCONTINUED | OUTPATIENT
Start: 2020-01-19 | End: 2020-01-19 | Stop reason: HOSPADM

## 2020-01-19 RX ORDER — DIPHENHYDRAMINE HYDROCHLORIDE 50 MG/ML
INJECTION INTRAMUSCULAR; INTRAVENOUS
Status: COMPLETED
Start: 2020-01-19 | End: 2020-01-19

## 2020-01-19 RX ORDER — ONDANSETRON 2 MG/ML
4 INJECTION INTRAMUSCULAR; INTRAVENOUS
Status: COMPLETED | OUTPATIENT
Start: 2020-01-19 | End: 2020-01-19

## 2020-01-19 RX ORDER — ONDANSETRON 2 MG/ML
INJECTION INTRAMUSCULAR; INTRAVENOUS
Status: COMPLETED
Start: 2020-01-19 | End: 2020-01-19

## 2020-01-19 RX ORDER — LIDOCAINE HYDROCHLORIDE 20 MG/ML
INJECTION, SOLUTION EPIDURAL; INFILTRATION; INTRACAUDAL; PERINEURAL PRN
Status: DISCONTINUED | OUTPATIENT
Start: 2020-01-19 | End: 2020-01-19 | Stop reason: SURG

## 2020-01-19 RX ORDER — MORPHINE SULFATE 4 MG/ML
2 INJECTION, SOLUTION INTRAMUSCULAR; INTRAVENOUS
Status: DISCONTINUED | OUTPATIENT
Start: 2020-01-19 | End: 2020-01-22

## 2020-01-19 RX ORDER — OXYCODONE HYDROCHLORIDE AND ACETAMINOPHEN 5; 325 MG/1; MG/1
1-2 TABLET ORAL EVERY 4 HOURS PRN
Status: DISCONTINUED | OUTPATIENT
Start: 2020-01-19 | End: 2020-01-30 | Stop reason: HOSPADM

## 2020-01-19 RX ORDER — DIPHENHYDRAMINE HYDROCHLORIDE 50 MG/ML
12.5 INJECTION INTRAMUSCULAR; INTRAVENOUS
Status: DISCONTINUED | OUTPATIENT
Start: 2020-01-19 | End: 2020-01-19 | Stop reason: HOSPADM

## 2020-01-19 RX ORDER — SODIUM CHLORIDE 9 MG/ML
INJECTION, SOLUTION INTRAVENOUS
Status: COMPLETED
Start: 2020-01-19 | End: 2020-01-19

## 2020-01-19 RX ORDER — BUPIVACAINE HYDROCHLORIDE AND EPINEPHRINE 5; 5 MG/ML; UG/ML
INJECTION, SOLUTION PERINEURAL
Status: DISCONTINUED | OUTPATIENT
Start: 2020-01-19 | End: 2020-01-19 | Stop reason: HOSPADM

## 2020-01-19 RX ORDER — MEPERIDINE HYDROCHLORIDE 25 MG/ML
INJECTION INTRAMUSCULAR; INTRAVENOUS; SUBCUTANEOUS PRN
Status: DISCONTINUED | OUTPATIENT
Start: 2020-01-19 | End: 2020-01-19 | Stop reason: SURG

## 2020-01-19 RX ORDER — PHENYLEPHRINE HCL IN 0.9% NACL 0.5 MG/5ML
SYRINGE (ML) INTRAVENOUS PRN
Status: DISCONTINUED | OUTPATIENT
Start: 2020-01-19 | End: 2020-01-19 | Stop reason: SURG

## 2020-01-19 RX ORDER — MEPERIDINE HYDROCHLORIDE 25 MG/ML
INJECTION INTRAMUSCULAR; INTRAVENOUS; SUBCUTANEOUS
Status: COMPLETED
Start: 2020-01-19 | End: 2020-01-19

## 2020-01-19 RX ADMIN — LEVOTHYROXINE SODIUM 175 MCG: 150 TABLET ORAL at 04:26

## 2020-01-19 RX ADMIN — HALOPERIDOL LACTATE 1 MG: 5 INJECTION, SOLUTION INTRAMUSCULAR at 13:38

## 2020-01-19 RX ADMIN — ATORVASTATIN CALCIUM 10 MG: 10 TABLET, FILM COATED ORAL at 20:04

## 2020-01-19 RX ADMIN — ALFENTANIL HYDROCHLORIDE 1000 MCG: 500 INJECTION INTRAVENOUS at 11:54

## 2020-01-19 RX ADMIN — CEFEPIME 1 G: 1 INJECTION, POWDER, FOR SOLUTION INTRAMUSCULAR; INTRAVENOUS at 17:06

## 2020-01-19 RX ADMIN — MORPHINE SULFATE 2 MG: 4 INJECTION INTRAVENOUS at 04:11

## 2020-01-19 RX ADMIN — PROPOFOL 20 MG: 10 INJECTION, EMULSION INTRAVENOUS at 11:54

## 2020-01-19 RX ADMIN — DIPHENHYDRAMINE HYDROCHLORIDE 12.5 MG: 50 INJECTION INTRAMUSCULAR; INTRAVENOUS at 13:47

## 2020-01-19 RX ADMIN — SODIUM CHLORIDE: 9 INJECTION, SOLUTION INTRAVENOUS at 17:00

## 2020-01-19 RX ADMIN — MORPHINE SULFATE 2 MG: 4 INJECTION INTRAVENOUS at 08:52

## 2020-01-19 RX ADMIN — VANCOMYCIN HYDROCHLORIDE 1500 MG: 500 INJECTION, POWDER, LYOPHILIZED, FOR SOLUTION INTRAVENOUS at 09:57

## 2020-01-19 RX ADMIN — PROCHLORPERAZINE EDISYLATE 10 MG: 5 INJECTION INTRAMUSCULAR; INTRAVENOUS at 19:43

## 2020-01-19 RX ADMIN — Medication 120 MG: at 11:57

## 2020-01-19 RX ADMIN — CEFEPIME 1 G: 1 INJECTION, POWDER, FOR SOLUTION INTRAMUSCULAR; INTRAVENOUS at 22:56

## 2020-01-19 RX ADMIN — ROCURONIUM BROMIDE 5 MG: 10 INJECTION, SOLUTION INTRAVENOUS at 11:54

## 2020-01-19 RX ADMIN — LOSARTAN POTASSIUM 100 MG: 50 TABLET, FILM COATED ORAL at 04:26

## 2020-01-19 RX ADMIN — ONDANSETRON 4 MG: 2 INJECTION INTRAMUSCULAR; INTRAVENOUS at 03:57

## 2020-01-19 RX ADMIN — ONDANSETRON 4 MG: 2 INJECTION INTRAMUSCULAR; INTRAVENOUS at 13:24

## 2020-01-19 RX ADMIN — LIDOCAINE HYDROCHLORIDE 160 MG: 40 SOLUTION TOPICAL at 11:58

## 2020-01-19 RX ADMIN — HALOPERIDOL 1 MG: 5 INJECTION INTRAMUSCULAR at 13:38

## 2020-01-19 RX ADMIN — VANCOMYCIN HYDROCHLORIDE 1.5 G: 500 INJECTION, POWDER, LYOPHILIZED, FOR SOLUTION INTRAVENOUS at 11:51

## 2020-01-19 RX ADMIN — Medication 30 MCG: at 12:24

## 2020-01-19 RX ADMIN — MEPERIDINE HYDROCHLORIDE 25 MG: 25 INJECTION INTRAMUSCULAR; INTRAVENOUS; SUBCUTANEOUS at 12:46

## 2020-01-19 RX ADMIN — LIDOCAINE HYDROCHLORIDE 40 MG: 20 INJECTION, SOLUTION EPIDURAL; INFILTRATION; INTRACAUDAL at 11:54

## 2020-01-19 RX ADMIN — INSULIN GLARGINE 24 UNITS: 100 INJECTION, SOLUTION SUBCUTANEOUS at 20:06

## 2020-01-19 RX ADMIN — ONDANSETRON 4 MG: 2 INJECTION INTRAMUSCULAR; INTRAVENOUS at 12:48

## 2020-01-19 RX ADMIN — OXYCODONE HYDROCHLORIDE AND ACETAMINOPHEN 2 TABLET: 5; 325 TABLET ORAL at 16:43

## 2020-01-19 RX ADMIN — SENNOSIDES AND DOCUSATE SODIUM 2 TABLET: 8.6; 5 TABLET ORAL at 04:26

## 2020-01-19 RX ADMIN — PIPERACILLIN AND TAZOBACTAM 4.5 G: 4; .5 INJECTION, POWDER, LYOPHILIZED, FOR SOLUTION INTRAVENOUS; PARENTERAL at 04:26

## 2020-01-19 RX ADMIN — MEPERIDINE HYDROCHLORIDE 25 MG: 25 INJECTION INTRAMUSCULAR; INTRAVENOUS; SUBCUTANEOUS at 12:07

## 2020-01-19 RX ADMIN — PROPOFOL 80 MG: 10 INJECTION, EMULSION INTRAVENOUS at 11:57

## 2020-01-19 RX ADMIN — MEPERIDINE HYDROCHLORIDE 25 MG: 25 INJECTION INTRAMUSCULAR; INTRAVENOUS; SUBCUTANEOUS at 13:25

## 2020-01-19 ASSESSMENT — ENCOUNTER SYMPTOMS
DIZZINESS: 0
HEADACHES: 0
EYE PAIN: 0
COUGH: 0
NAUSEA: 0
CHILLS: 1
ABDOMINAL PAIN: 0
PALPITATIONS: 0
SPUTUM PRODUCTION: 0
FEVER: 0
SHORTNESS OF BREATH: 0
EYE DISCHARGE: 0
ORTHOPNEA: 0
BACK PAIN: 1
VOMITING: 0

## 2020-01-19 ASSESSMENT — PAIN SCALES - GENERAL: PAIN_LEVEL: 0

## 2020-01-19 NOTE — ASSESSMENT & PLAN NOTE
Involving her spinal stimulator area.  Received course of IV  Vancomycin and Zosyn   Neurosurgery following had surgery and removal of the spine stimulator yesterday  ID also following  Appreciate rec.   On linezolid until 2/3/20 per ID.  Linezolid is chosen as a safest antibiotic in the setting of acute renal failure, despite culture positive for MSSA.  Discussed with ID Dr. Reeves

## 2020-01-19 NOTE — ED PROVIDER NOTES
"ED Provider Note    CHIEF COMPLAINT  Chief Complaint   Patient presents with   • Wound Infection   • Back Pain       HPI  Anu Manuel is a 64 y.o. female who presents to the emergency department complaining of increasing low back pain and feeling ill and concerned about a wound infection around her new spinal cord stimulator.  On December 16, 2019 the patient had her spinal cord stimulator replaced, her neurosurgeon is Dr. Roman.  She took a course of antibiotics immediately after surgery and then went to have her staples removed in the office and the patient says the nurse practitioner told her that there was  an infection around the wound and started the patient on Keflex and she finished a 10-day course of that antibiotic as well.  Despite these measures she is now experiencing worsening pain in the lumbar area especially on the side where the spinal cord stimulator is in the skin in that area has become red and in general the patient says she feels ill but she has not had a fever.  She does have new sensation of tingling in the lower extremities but she does not have new weakness in the legs or change in bowel or bladder function.    REVIEW OF SYSTEMS no chest pain no difficulty breathing.  All other systems negative    PAST MEDICAL HISTORY  Past Medical History:   Diagnosis Date   • Adverse effect of anesthesia     in 2008 \"throat closes up\"\"anxiety\" ?laryngospasm, kept in ICU. Pt states no problems currently 2018.    • Anesthesia     in 2008 \"throat closes up\"\"anxiety\" ?laryngospasm, kept in ICU. Pt states no problems currently 2018.    • Arthritis     right shoulder, hands   • Cigarette smoker quit 2013   • Dental disorder     missing teeth    • depression 8/30/2016    denies depression, states has anxiety and panic attacks   • Diabetes mellitus type 1 ([facility]) 1989    insulin   • Encounter for long-term (current) use of insulin ([facility]) 9/25/2013   • Heart burn    • High cholesterol    • Hypertension " "   • Hypothyroidism, postsurgical 1970   • Indigestion    • Infectious disease      had hepatitis C, tested neg.   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.63) 6/10/2015       FAMILY HISTORY  Family History   Problem Relation Age of Onset   • Hypertension Mother    • Cancer Father        SOCIAL HISTORY  Social History     Socioeconomic History   • Marital status:      Spouse name: Not on file   • Number of children: Not on file   • Years of education: Not on file   • Highest education level: Not on file   Occupational History   • Not on file   Social Needs   • Financial resource strain: Not on file   • Food insecurity:     Worry: Not on file     Inability: Not on file   • Transportation needs:     Medical: Not on file     Non-medical: Not on file   Tobacco Use   • Smoking status: Current Every Day Smoker     Packs/day: 0.50     Years: 30.00     Pack years: 15.00   • Smokeless tobacco: Never Used   • Tobacco comment: 1 ppd    Substance and Sexual Activity   • Alcohol use: No     Comment:     • Drug use: No   • Sexual activity: Not on file   Lifestyle   • Physical activity:     Days per week: Not on file     Minutes per session: Not on file   • Stress: Not on file   Relationships   • Social connections:     Talks on phone: Not on file     Gets together: Not on file     Attends Shinto service: Not on file     Active member of club or organization: Not on file     Attends meetings of clubs or organizations: Not on file     Relationship status: Not on file   • Intimate partner violence:     Fear of current or ex partner: Not on file     Emotionally abused: Not on file     Physically abused: Not on file     Forced sexual activity: Not on file   Other Topics Concern   • Not on file   Social " History Narrative   • Not on file       SURGICAL HISTORY  Past Surgical History:   Procedure Laterality Date   • PB IMPLANT NEUROSTIM/ N/A 12/16/2019    Procedure: EXPLORATION AT THORACIC 8 - 9, REPLACEMENT OF  NEUROSTIMULATOR LEAD;  Surgeon: Ryan Roman M.D.;  Location: [facility];  Service: Neurosurgery   • SPINAL CORD STIMULATOR N/A 10/26/2018    Procedure: SPINAL CORD STIMULATOR;  Surgeon: Ryan Roman M.D.;  Location: SUR[facility];  Service: Neurosurgery   • THORACIC LAMINECTOMY N/A 10/26/2018    Procedure: THORACIC LAMINECTOMY - FOR;  Surgeon: Ryan Roman M.D.;  Location: [facility];  Service: Neurosurgery   • PB INJ,FORAMEN,L/S,1 LEVEL Right 8/31/2016    Procedure: INJ-FORAMEN EPI LUM/SAC SNGL L4-5;  Surgeon: Sukhi Godfrey M.D.;  Location: [facility];  Service: Pain Management   • LUMBAR LAMINECTOMY DISKECTOMY Right 5/10/2016    Procedure: LUMBAR L4-5 HEMILAMINECTOMY DISKECTOMY ;  Surgeon: Arnold Keyes M.D.;  Location: [facility];  Service:    • CERVICAL FUSION POSTERIOR  1/16/2009    Performed by TARA CONTRERAS at [facility]   • HARDWARE REMOVAL NEURO  1/16/2009    Performed by TARA CONTRERAS at [facility]   • NECK EXPLORATION  1/16/2009    Performed by TARA CONTRERAS at [facility]   • SHOULDER ARTHROSCOPY W/ ROTATOR CUFF REPAIR  10/9/08    Performed by SHERLY CASTANEDA at [facility]   • SHOULDER DECOMPRESSION ARTHROSCOPIC  6/17/08    Performed by SHERLY CASTANEDA at [facility]   • CLAVICLE DISTAL EXCISION  6/17/08    Performed by SHERLY CASTANEDA at [facility]   • CERVICAL DISK AND FUSION ANTERIOR  03/12/08   • HYSTERECTOMY, VAGINAL  2006   • APPENDECTOMY  2004   • THYROID LOBECTOMY  1973   • TONSILLECTOMY  1963   • ABDOMINAL HYSTERECTOMY TOTAL     • LUMPECTOMY  1976, 2005    Breast    • LUMPECTOMY         CURRENT MEDICATIONS  Home Medications  "    Reviewed by Concepción Barton PhT (Pharmacy Tech) on 01/18/20 at 1458  Med List Status: Complete   Medication Last Dose Status   atorvastatin (LIPITOR) 10 MG Tab 1/17/2020 Active   cephALEXin (KEFLEX) 500 MG Cap 1/4/2020 Active   insulin aspart (NOVOLOG) 100 UNIT/ML Solution 1/18/2020 Active   Insulin Degludec (TRESIBA FLEXTOUCH) 200 UNIT/ML Solution Pen-injector 1/17/2020 Active   levothyroxine (SYNTHROID) 175 MCG Tab 1/18/2020 Active   losartan (COZAAR) 100 MG Tab 1/17/2020 Active                ALLERGIES  Allergies   Allergen Reactions   • Ativan Unspecified      Extreme Restlessness with whole body  [id number]   • Tape      Blisters, paper tape is ok       PHYSICAL EXAM  VITAL SIGNS: /66   Pulse (!) 112   Temp 37 °C (98.6 °F) (Temporal)   Resp 20   Ht 1.676 m (5' 6\")   Wt 75 kg (165 lb 5.5 oz)   LMP 04/29/2005 (LMP Unknown)   SpO2 94%   BMI 26.69 kg/m²    Oxygen saturation is interpreted as adequate  Constitutional: Awake verbal nontoxic-appearing  HENT: Mucous membranes are moist  Eyes: No erythema discharge or jaundice  Neck: Trachea midline no JVD no meningeal findings  Cardiovascular: Regular tachycardia  Lungs: Clear and equal bilaterally with no apparent difficulty breathing  Abdomen/Back: Soft nontender nondistended no rebound guarding or peritoneal findings anteriorly.  Examination of the back shows a healed surgical incision lateral to the lumbar spine where the spinal cord stimulator is there is some slight surrounding erythema in the area and it does seem a little bit swollen and feels warm and is tender to the touch.  There is another incision higher on the spine in the thoracic area which looks healed and not swollen or erythematous but the patient says it is also tender in that area.  Skin: Otherwise warm and dry  Neurologic: Awake verbal carrying on a complete lucid conversation and moving her extremities without apparent difficulty and sensation is intact to light touch in the lower " extremities  Musculoskeletal: No acute bony deformity    Laboratory  CBC shows elevated white blood cell count of 14 hemoglobin is adequate at 12.7 basic metabolic panel shows an elevated blood glucose of 321 lactic acid level is normal at 1.7    MEDICAL DECISION MAKING and DISPOSITION  In the emergency department an IV is established the patient has been kept n.p.o. she has been given intravenous morphine for pain which was helpful.  Because of tachycardia the patient is started on intravenous normal saline and she remained stable.  I have reviewed the case with Dr. Guardado on-call for Dr. Roman for neurosurgery and he recommends transferring the patient to [facility] for further evaluation and treatment.  I have reviewed the case with the hospitalist here who will write orders for transfer.  The patient is likely to require an MRI of her spine but she has been told that her spinal cord stimulator will need to be turned off.  I have discussed this with the hospital the studies will be ordered at [facility].  I have started the patient on intravenous vancomycin and Zosyn.  I reviewed all the above in detail with the patient and her family and she is agreeable to transfer.    IMPRESSION  1.  Postoperative wound infection involving spinal cord stimulator  2.  Diabetic patient with hyperglycemia  3.  Low back pain with radiculopathy         Electronically signed by: Kolton Murillo M.D., 1/18/2020 4:53 PM

## 2020-01-19 NOTE — ED NOTES
[facility] arrived to transfer pt to [facility].  Report to [facility].  Vancomycin sent w/ pt.

## 2020-01-19 NOTE — ASSESSMENT & PLAN NOTE
Involving her spinal stimulator area.  IV Vancomycin and Zosyn were started.  ERP discussed case with  who recommended transfer for surgery in the am. He did not instruct for us to obtain any imaging.  I will order an MRI w/wo contrast of C/T/L spine.

## 2020-01-19 NOTE — ED NOTES
1547:  PIV placed in [facility], BC x 2 drawn and sent to lab.  Pt medicated as ordered.  Reviewed POC w/ pt and family member including pending tests and chart review by ERP, declined further needs at this time.

## 2020-01-19 NOTE — PROGRESS NOTES
This patient has an implanted neurostimulator ([id number] [employer]) which by the manufacturers safety specifications is MRI conditional. These conditions are that only certain extremities and the head can be safely scanned. Spines, abdomen, pelvis, etc. are contraindicated.

## 2020-01-19 NOTE — PROGRESS NOTES
Called and talked to Dr. Javier- informed him that they cant do MRI due to that stimulator on patient back- no orders made.

## 2020-01-19 NOTE — ASSESSMENT & PLAN NOTE
Uncontrolled  Last a1c was 10.7  Will resume home dose of Degludec 24U qhs, we will titrate  Continue Insulin-sliding scale, accu-checks and hypoglycemia protocol.  Continue with Consistent Carbohydrate diet

## 2020-01-19 NOTE — PROGRESS NOTES
Patient is a direct admit from [facility].   Dr Gallegos is accepting the patient and has placed discharge/readmit orders into [facility].   Dr Guardado is consulting for Neurosurgery.

## 2020-01-19 NOTE — PROGRESS NOTES
Hospital Medicine Daily Progress Note    Date of Service  1/19/2020    Chief Complaint  64 y.o. female admitted 1/18/2020 with wound infection, back pain     Hospital Course    This is a 65 y/o F with a past medical history of Insulin dependent diabetes mellitus, hypertension, recently underwent a replacement of her spinal cord stimulator on 12/16/2019. She had developed an nearby infection around the stimulator for which she was given outpatient antibiotics including Keflex, however despite taking these antibiotics patient now complains of worsening pain around that area. Pt admitted for further treatment. Neurosurgery has been consulted appreciate rec.         Interval Problem Update  Pt seen and examined, complaining of back pain and also HA. Afebrile, no nausea or vomiting. Neurosurgery has been consulted, appreciate rec.    Consultants/Specialty  Neurosurgery   Infection disease     Code Status  Full Code     Disposition  TBD    Review of Systems  Review of Systems   Constitutional: Positive for chills. Negative for fever.   HENT: Negative for ear pain and tinnitus.    Eyes: Negative for pain and discharge.   Respiratory: Negative for cough, sputum production and shortness of breath.    Cardiovascular: Negative for chest pain, palpitations and orthopnea.   Gastrointestinal: Negative for abdominal pain, nausea and vomiting.   Genitourinary: Negative for dysuria and urgency.   Musculoskeletal: Positive for back pain.   Neurological: Negative for dizziness and headaches.   All other systems reviewed and are negative.       Physical Exam  Temp:  [36.6 °C (97.8 °F)-37.8 °C (100.1 °F)] 36.6 °C (97.8 °F)  Pulse:  [] 105  Resp:  [16-20] 20  BP: (117-132)/(53-61) 132/61  SpO2:  [92 %-94 %] 92 %    Physical Exam  Vitals signs and nursing note reviewed.   HENT:      Head: Normocephalic and atraumatic.   Eyes:      General: No scleral icterus.        Right eye: No discharge.         Left eye: No discharge.       Conjunctiva/sclera: Conjunctivae normal.   Cardiovascular:      Rate and Rhythm: Normal rate.      Heart sounds: No murmur. No gallop.    Pulmonary:      Effort: Pulmonary effort is normal.      Breath sounds: Normal breath sounds. No stridor.   Abdominal:      General: Bowel sounds are normal. There is no distension.      Tenderness: There is no tenderness.   Skin:     General: Skin is warm.      Coloration: Skin is not jaundiced.      Findings: Erythema (at the surgical incision ) present.   Neurological:      Mental Status: She is alert and oriented to person, place, and time. Mental status is at baseline.   Psychiatric:         Mood and Affect: Mood normal.         Fluids    Intake/Output Summary (Last 24 hours) at 1/19/2020 1017  Last data filed at 1/19/2020 0430  Gross per 24 hour   Intake 250 ml   Output --   Net 250 ml       Laboratory  Recent Labs     01/18/20  1530 01/19/20  0505   WBC 14.0* 14.8*   RBC 4.16* 3.75*   HEMOGLOBIN 12.7 11.4*   HEMATOCRIT 38.3 36.1*   MCV 92.1 96.3   MCH 30.5 30.4   MCHC 33.2* 31.6*   RDW 41.6 44.1   PLATELETCT 257 225   MPV 9.7 9.3     Recent Labs     01/18/20  1530 01/19/20  0505   SODIUM 136 137   POTASSIUM 4.1 4.6   CHLORIDE 99 104   CO2 24 27   GLUCOSE 321* 137*   BUN 12 12   CREATININE 0.79 0.99   CALCIUM 8.6 8.4*                   Imaging  MR-LUMBAR SPINE-WITH & W/O    (Results Pending)   MR-THORACIC SPINE-WITH & W/O    (Results Pending)        Assessment/Plan  * Post-operative wound abscess- (present on admission)  Assessment & Plan  Involving her spinal stimulator area.  On IV  Vancomycin and Zosyn   Neurosurgery / Abel has been consulted appreciate rec.   ID also consulted appreciate rec.      Type 1 diabetes mellitus with neurological manifestations, uncontrolled (HCC)- (present on admission)  Assessment & Plan  Uncontrolled  Last a1c was 10.7  Cont Degludec 24U qhs, we will titrate  Continue Insulin-sliding scale, accu-checks and hypoglycemia  protocol.  Monitor     Hypothyroidism, postsurgical- (present on admission)  Assessment & Plan  Resume home dose of synthroid    Tobacco abuse- (present on admission)  Assessment & Plan  Counseled on cessation          VTE prophylaxis: scd

## 2020-01-19 NOTE — ANESTHESIA QCDR
2019 [facility] Clinical Data Registry (for Quality Improvement)     Postoperative nausea/vomiting risk protocol (Adult = 18 yrs and Pediatric 3-17 yrs)- (430 and 463)  General inhalation anesthetic (NOT TIVA) with PONV risk factors: No  Provision of anti-emetic therapy with at least 2 different classes of agents: N/A  Patient DID NOT receive anti-emetic therapy and reason is documented in Medical Record: N/A    Multimodal Pain Management- (477)  Non-emergent surgery AND patient age >= 18: No  Use of Multimodal Pain Management, two or more drugs and/or interventions, NOT including systemic opioids:   Exception: Documented allergy to multiple classes of analgesics:     Smoking Abstinence (404)  Patient is current smoker (cigarette, pipe, e-cig, marijuanna): Yes  Elective Surgery: No  Abstinence instructions provided prior to day of surgery:   Patient abstained from smoking on day of surgery:     Pre-Op Beta-Blocker in Isolated CABG (44)  Isolated CABG AND patient age >= 18: No  Beta-blocker admin within 24 hours of surgical incision:   Exception:of medical reason(s) for not administering beta blocker within 24 hours prior to surgical incision (e.g., not  indicated,other medical reason):     PACU assessment of acute postoperative pain prior to Anesthesia Care End- Applies to Patients Age = 18- (ABG7)  Initial PACU pain score is which of the following: < 7/10  Patient unable to report pain score: N/A    Post-anesthetic transfer of care checklist/protocol to PACU/ICU- (426 and 427)  Upon conclusion of case, patient transferred to which of the following locations: PACU/Non-ICU  Use of transfer checklist/protocol: Yes  Exclusion: Service Performed in Patient Hospital Room (and thus did not require transfer): N/A  Unplanned admission to ICU related to anesthesia service up through end of PACU care- (MD51)  Unplanned admission to ICU (not initially anticipated at anesthesia start time): No

## 2020-01-19 NOTE — CONSULTS
Date of Service  1/19/2020     Chief Complaint  64 y.o. female admitted 1/18/2020 with wound infection, back pain      HPI  63 y/o F with a past medical history of Insulin dependent diabetes mellitus, hypertension, recently underwent a replacement of her spinal cord stimulator on 12/16/2019.  She had developed an nearby infection around the stimulator for which she was given outpatient antibiotics including Keflex, however despite taking these antibiotics patient now complains of worsening pain around that area. She was admitted yesterday for further evaluation.  She complains of pain a the incision sites; she is not currently having leg pain, numnbess, weakness.      Code Status  Full Code      Disposition    PMH: per h/p    PSHper h/p    MEDICATIONper h/p    SHper h/p       Review of Systems  Review of Systems   Constitutional: Positive for chills. Negative for fever.   HENT: Negative for ear pain and tinnitus.    Eyes: Negative for pain and discharge.   Respiratory: Negative for cough, sputum production and shortness of breath.    Cardiovascular: Negative for chest pain, palpitations and orthopnea.   Gastrointestinal: Negative for abdominal pain, nausea and vomiting.   Genitourinary: Negative for dysuria and urgency.   Musculoskeletal: Positive for back pain.   Neurological: Negative for dizziness and headaches.   All other systems reviewed and are negative.        Physical Exam  Temp:  [36.6 °C (97.8 °F)-37.8 °C (100.1 °F)] 36.6 °C (97.8 °F)  Pulse:  [] 105  Resp:  [16-20] 20  BP: (117-132)/(53-61) 132/61  SpO2:  [92 %-94 %] 92 %     Physical Exam  Vitals signs and nursing note reviewed.   HENT:      Head: Normocephalic and atraumatic.   Eyes:      General: No scleral icterus.        Right eye: No discharge.         Left eye: No discharge.      Conjunctiva/sclera: Conjunctivae normal.   Skin:     General: Skin is warm.      Coloration: Skin is not jaundiced.      Findings: Erythema right flank incision;  lump at midline thoracic incision with out erythema, warmth  Neurological:        Awake, alert   Speech fluent appropriate  In no apparent distress  Affect, mood appropriate  Oriented x 3  Pupils 3 mm midline, reactive.  Conjugate gaze.  Visual fields full to confrontation  Face symmetric  Tongue midline without fasciculation  Facial sensation intact light touch  Hearing intact to conversation, light finger rub bilaterally  Motor:  Bilateral SCM, shoulder shrug, deltoid, bicep, tricep, , wrist extension, hand intrinsics                IP, thigh adduction, thigh abduction, quadriceps, hamstrings, dorsiflexion,                Plantar flexion, foot inversion, foot eversion, EHL 5/5 no pronator drift  Sensation:  Intact touch face, neck, torso, four extremities  Reflex:  No Varela's no clonus  Finger-nose-finger, FELY unremarkable  Psychiatric:         Mood and Affect: Mood normal.            Fluids     Intake/Output Summary (Last 24 hours) at 1/19/2020 1017  Last data filed at 1/19/2020 0430      Gross per 24 hour   Intake 250 ml   Output --   Net 250 ml         Laboratory       Recent Labs     01/18/20  1530 01/19/20  0505   WBC 14.0* 14.8*   RBC 4.16* 3.75*   HEMOGLOBIN 12.7 11.4*   HEMATOCRIT 38.3 36.1*   MCV 92.1 96.3   MCH 30.5 30.4   MCHC 33.2* 31.6*   RDW 41.6 44.1   PLATELETCT 257 225   MPV 9.7 9.3           Recent Labs     01/18/20  1530 01/19/20  0505   SODIUM 136 137   POTASSIUM 4.1 4.6   CHLORIDE 99 104   CO2 24 27   GLUCOSE 321* 137*   BUN 12 12   CREATININE 0.79 0.99   CALCIUM 8.6 8.4*          AP:  64 year old female with apparent infection involving midline thoracic and right flank incisions.  Surgical exploration and removal of the system is planned.  The procedure was discussed in detail.  Risks including but not limited to infection, bleeding, wound breakdown was discussed.  She expressed understanding of these issues and wishes to proceed.  All questions were answered.

## 2020-01-19 NOTE — OP REPORT
NEUROSURGERY OPERATIVE NOTE  DATE:  1:16 PM 2020    PATIENT NAME:  Anu Manuel   1955 MRN [id number]      PROCEDURE:  1. Exploration/washout thoracic, right flank incision  2.  Removal spinal cord stimulator    Surgeon:  Ryan Romna MD, PhD  Assistant: none    Anesthesia:  MARIELY    Diagnosis:  Surgical site infection    Indication:  64 year old female with history of smoking and diabetes who underwent spinal cord stimulator placement over a month ago; she was doing well but developed pain and tederness a the incision sites.  Surgical exploration is planned.    Procedure:  The patient was identified in the holding area, and the surgery site marked, consent was obtained.  The patinet was brought back to the operating room and intubated by anesthesia service.  She is on scheduled antibiotics at this time.   She was transferred to the OSI operating room table in a prone manner and all pressure points well padded.  Their lumbar region was prepped with chlorhexidine and betadine scrub, and Chloroprep.  Sterile drapes were applied including a layer of Ioban.      The thoracic incision was opened sharply.  Material was expressed under pressure.  Several cc were obtained and sent for aerobic, anaerobic cultures as well as Gram stain and cell count.  The operative site was pineda irrigated with normal saline bacitracin irrigation with 1 g/L vancomycin.  The leads were identified, and the epidural paddle removed.  The anchors were removed as well.  The infection appeared to be suprafascial, did not obviously extend below the fascia.    The right flank incision was then opened sharply, with purulent material expressed out under increased pressure.  The generator was removed, the leads divided sharply and all leads and the generator removed.  The operative site was pineda irrigated with normal saline bacitracin irrigation with 1 g/L vancomycin.  Half a gram of vancomycin was then placed in each of the  incisions.  The incisions were closed in like manner after a #7 round drain had been placed in each.  The drains have been brought out through a separate incision and secured to the skin using 2-0 nylon suture.    The dermis was reapproximated with 3-0 Vicryl suture in an inverted interrupted manner.  The skin was reapproximated with staples.  A silver containing dressing was placed on each incision.  Final counts were correct.    FINDINGS: Suprafascial infection thoracic surgical site, right flank surgical site infection.  Good washout obtained.  All hardware removed.  SPECIMEN:  None  DRAINS:  #7 round to ALBINO bulb not yet thoracic and right flank incisions  EBL:  10 CC  COMPLICATIONS:  None apparent at end of procedure.

## 2020-01-19 NOTE — H&P
Hospital Medicine History & Physical Note    Date of Service  1/18/2020    Primary Care Physician  Jarrell Yates M.D.    Consultants   Neurosurgery    Code Status  Full Code    Chief Complaint  Chief Complaint   Patient presents with   • Wound Infection   • Back Pain       History of Presenting Illness  Walker is a very pleasant 64 y.o. female with a past medical history of Insulin dependent diabetes mellitus, hypertension, recently underwent a  Replacement of her spinal cord stimulator on 12/16/2019, despite this patient developed a nearby infection around the stimulator which she was given outpatient antibiotics including Keflex, however despite taking these antibiotics patient now complains of worsening pain around that area.  Although she denies have any fevers chills, fecal urinary incontinence, denies any weakness or numbness or tingling in her lower extremities. The only thing she does complain of in the past 24 hours is shooting pain in her bilateral legs.  As result of this patient will be transferred to [facility] for neurosurgical evaluation.    Review of Systems  Review of Systems   Constitutional: Negative for chills, fever and malaise/fatigue.   HENT: Negative for congestion, hearing loss, sore throat and tinnitus.    Eyes: Negative for blurred vision, double vision, photophobia and pain.   Respiratory: Negative for cough, hemoptysis, sputum production, shortness of breath and stridor.    Cardiovascular: Negative for chest pain, palpitations, orthopnea, claudication and PND.   Gastrointestinal: Negative for blood in stool, constipation, heartburn, melena, nausea and vomiting.   Genitourinary: Negative for dysuria, frequency and urgency.   Musculoskeletal: Negative for back pain, myalgias and neck pain.   Neurological: Negative for dizziness, tingling, tremors, sensory change, speech change, weakness and headaches.        Shooting sensation in her lower legs    "  Psychiatric/Behavioral: Negative for depression, memory loss and suicidal ideas. The patient is nervous/anxious.    All other systems reviewed and are negative.      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 Pt states no problems currently 2018.    • Anesthesia     in 2008 \"throat closes up\"\"anxiety\" ?laryngospasm, kept in ICU. Pt states no problems currently 2018.    • Arthritis     right shoulder, hands   • Cigarette smoker quit 2013   • Dental disorder     missing teeth    • Heart burn    • High cholesterol    • Hypertension    • Indigestion    • Infectious disease      had hepatitis C, tested neg.   • Joint replacement     cervical   • Pain     \"fibromyalgia\";lower back, right leg   • Status post appendectomy        Surgical History   has a past surgical history that includes hysterectomy, vaginal (2006); thyroid lobectomy (1973); lumpectomy (1976, 2005); cervical disk and fusion anterior (03/12/08); tonsillectomy (1963); cervical fusion posterior (1/16/2009); hardware removal neuro (1/16/2009); neck exploration (1/16/2009); abdominal hysterectomy total; lumpectomy; lumbar laminectomy diskectomy (Right, 5/10/2016); shoulder decompression arthroscopic (6/17/08); clavicle distal excision (6/17/08); shoulder arthroscopy w/ rotator cuff repair (10/9/08); pr inj,foramen,l/s,1 level (Right, 8/31/2016); spinal cord stimulator (N/A, 10/26/2018); thoracic laminectomy (N/A, 10/26/2018); appendectomy " (2004); and pr implant neurostim/ (N/A, 12/16/2019).    Family History  family history includes Cancer in her father; Hypertension in her mother.      Social History   reports that she has been smoking. She has a 15.00 pack-year smoking history. She has never used smokeless tobacco. She reports that she does not drink alcohol or use drugs.    Allergies  Allergies   Allergen Reactions   • Ativan Unspecified      Extreme Restlessness with whole body  NIR=[id number]   • Tape      Blisters, paper tape is ok       Medications  Prior to Admission medications    Medication Sig Start Date End Date Taking? Authorizing Provider   losartan (COZAAR) 100 MG Tab Take 100 mg by mouth every day.   Yes Physician Outpatient   cephALEXin (KEFLEX) 500 MG Cap Take 1,000 mg by mouth 2 times a day. 5 day course 12/30/19  Yes Physician Outpatient   atorvastatin (LIPITOR) 10 MG Tab Take 10 mg by mouth every evening.    Physician Outpatient   Insulin Degludec (TRESIBA FLEXTOUCH) 200 UNIT/ML Solution Pen-injector Inject 24 Units as instructed every bedtime.    Physician Outpatient   insulin aspart (NOVOLOG) 100 UNIT/ML Solution Inject 2-7 Units as instructed 3 times a day before meals. Sliding Scale    Physician Outpatient   levothyroxine (SYNTHROID) 175 MCG Tab Take 175 mcg by mouth Every morning on an empty stomach.    Physician Outpatient       Physical Exam  Temp:  [37 °C (98.6 °F)] 37 °C (98.6 °F)  Pulse:  [106-110] 106  Resp:  [20] 20  BP: (140-147)/(57-75) 140/57  SpO2:  [90 %-94 %] 90 %  Physical Exam   Constitutional: She is oriented to person, place, and time. She appears well-developed and well-nourished. No distress.   HENT:   Head: Normocephalic and atraumatic.   Mouth/Throat: No oropharyngeal exudate.   Eyes: Pupils are equal, round, and reactive to light. Conjunctivae are normal. Right eye exhibits no discharge. No scleral icterus.   Neck: Neck supple. No JVD present. No thyromegaly present.   Cardiovascular: Normal rate and  intact distal pulses.   No murmur heard.  Pulmonary/Chest: Effort normal and breath sounds normal. No stridor. No respiratory distress. She has no wheezes. She has no rales.   Abdominal: Soft. Bowel sounds are normal. She exhibits no distension. There is no tenderness. There is no rebound.   Musculoskeletal: Normal range of motion.         General: No edema.   Neurological: She is alert and oriented to person, place, and time. She displays normal reflexes. No cranial nerve deficit. She exhibits normal muscle tone. Coordination normal.   Patient is able to wrinkle forehead equal and bilaterally, Strength 5/5 LUE, 5/5 RUE, 5/5 LLE, 5/5 RLE,   Sensation to light touch intact  Plantar Flexion, Dorsiflexion, Extensor Hallicus Longus 5/5,  No clonus noted ankle/elbow  Finger to nose equal bilaterally  No clonus or gaze     Skin: Skin is warm. She is not diaphoretic. No erythema.   Psychiatric: She has a normal mood and affect. Her behavior is normal. Thought content normal.   Nursing note and vitals reviewed.      Laboratory:  Recent Labs     01/18/20  1530   WBC 14.0*   RBC 4.16*   HEMOGLOBIN 12.7   HEMATOCRIT 38.3   MCV 92.1   MCH 30.5   MCHC 33.2*   RDW 41.6   PLATELETCT 257   MPV 9.7     Recent Labs     01/18/20  1530   SODIUM 136   POTASSIUM 4.1   CHLORIDE 99   CO2 24   GLUCOSE 321*   BUN 12   CREATININE 0.79   CALCIUM 8.6     Recent Labs     01/18/20  1530   ALTSGPT 14   ASTSGOT 12   ALKPHOSPHAT 116*   TBILIRUBIN 0.3   GLUCOSE 321*               Urinalysis:          Imaging:  No orders to display       Assessment/Plan:  I anticipate this patient will require at least two midnights for appropriate medical management, necessitating inpatient admission.    * Post-operative wound abscess  Assessment & Plan  Involving her spinal stimulator area.  IV Vancomycin and Zosyn were started.  ERP discussed case with  who recommended transfer for surgery in the am. He did not instruct for us to obtain any imaging.  I  will order an MRI w/wo contrast of C/T/L spine.     Type 1 diabetes mellitus with neurological manifestations, uncontrolled (HCC)- (present on admission)  Assessment & Plan  Uncontrolled  Last a1c was 10.7  Will resume home dose of Degludec 24U qhs, we will titrate  Continue Insulin-sliding scale, accu-checks and hypoglycemia protocol.  Continue with Consistent Carbohydrate diet       Hypothyroidism, postsurgical- (present on admission)  Assessment & Plan  Resume home dose of synthroid    Tobacco abuse  Assessment & Plan  Tobacco cessation counseling and education provided for more than 7 minutes. We discussed the risks of smoking including increased risk of heart disease, stroke, cancer and COPD. We discussed the benefits of quitting smoking. Nicotine replacement options provided including patch, and further medical treatments including Wellbutrin and chantix.        VTE prophylaxis: Prophylaxis: SCDs

## 2020-01-19 NOTE — ANESTHESIA POSTPROCEDURE EVALUATION
Patient: Anu Manuel    Procedure Summary     Date:  01/19/20 Room / Location:  [facility] 05 / SURGERY [facility]    Anesthesia Start:  1151 Anesthesia Stop:  1305    Procedure:  IRRIGATION AND DEBRIDEMENT, WOUND THORACIC AND LUMBAR (Spine Thoracic) Diagnosis:  (Irrigation and debridement of lumbar )    Surgeon:  Ryan Roman M.D. Responsible Provider:      Anesthesia Type:  general ASA Status:  3 - Emergent          Final Anesthesia Type: general  Last vitals  BP   Blood Pressure: 126/68    Temp   36.4 °C (97.5 °F)    Pulse   Pulse: 96   Resp   18    SpO2   97 %      Anesthesia Post Evaluation    Patient location during evaluation: PACU  Patient participation: complete - patient participated  Level of consciousness: awake and alert  Pain score: 0    Airway patency: patent  Anesthetic complications: no  Cardiovascular status: hemodynamically stable  Respiratory status: acceptable  Hydration status: euvolemic    PONV: none           Nurse Pain Score: 8 (NPRS)

## 2020-01-19 NOTE — DISCHARGE SUMMARY
Discharge Transfer Summary    CHIEF COMPLAINT ON ADMISSION  Chief Complaint   Patient presents with   • Wound Infection   • Back Pain       Reason for Admission  Post op comp/wound check     CODE STATUS  Full Code    HPI & HOSPITAL COURSE   is a very pleasant 64 y.o. female with a past medical history of Insulin dependent diabetes mellitus, hypertension, recently underwent a  Replacement of her spinal cord stimulator on 12/16/2019, despite this patient developed a nearby infection around the stimulator which she was given outpatient antibiotics including Keflex, however despite taking these antibiotics patient now complains of worsening pain around that area.  Although she denies have any fevers chills, fecal urinary incontinence, denies any weakness or numbness or tingling in her lower extremities. The only thing she does complain of in the past 24 hours is shooting pain in her bilateral legs.  As result of this patient will be transferred to [facility] for neurosurgical evaluation.

## 2020-01-19 NOTE — ASSESSMENT & PLAN NOTE
Tobacco cessation counseling and education provided for more than 7 minutes. We discussed the risks of smoking including increased risk of heart disease, stroke, cancer and COPD. We discussed the benefits of quitting smoking. Nicotine replacement options provided including patch, and further medical treatments including Wellbutrin and chantix.

## 2020-01-19 NOTE — ASSESSMENT & PLAN NOTE
Uncontrolled  Last a1c was 10.7  Cont Degludec.  Dose decreased to 15 units from 25 on 1/22 due to hypoglycemia  Continue Insulin-sliding scale, accu-checks and hypoglycemia protocol.  Monitor   1/24 we will further decrease dose of Lantus to 5 units due to hypoglycemia  1/25 Lantus DC'd  1/26 restart Lantus 8 units at night, as blood sugar control worsened with hemodialysis secondary to improvement of insulin clearance  1/27 reduce dose of Lantus from 8 to 5 units

## 2020-01-19 NOTE — PROGRESS NOTES
Patient arrive from [facility], transferred to [facility], vanco infused as ordered, called Dr. Guardado office that the patient here.

## 2020-01-19 NOTE — ANESTHESIA PREPROCEDURE EVALUATION
Relevant Problems   ENDO   (+) Diabetes mellitus type 1 (HCC)   (+) Hypothyroidism, postsurgical   (+) Type 1 diabetes mellitus with neurological manifestations, uncontrolled (HCC)       Physical Exam    Airway   Mallampati: II  TM distance: >3 FB  Neck ROM: full       Cardiovascular - normal exam  Rhythm: regular  Rate: normal     Dental - normal exam         Pulmonary - normal exam  Breath sounds clear to auscultation     Abdominal    Neurological - normal exam                 Anesthesia Plan    ASA 3- EMERGENT   ASA physical status emergent criteria: sepsis    Plan - general       Airway plan will be ETT        Induction: intravenous and rapid sequence    Postoperative Plan: Postoperative administration of opioids is intended.    Pertinent diagnostic labs and testing reviewed    Informed Consent:    Anesthetic plan and risks discussed with patient.    Use of blood products discussed with: patient whom consented to blood products.

## 2020-01-19 NOTE — PROGRESS NOTES
Pharmacy Kinetics 64 y.o. female on vancomycin day # 1 2020    Currently on Vancomycin 2000 mg iv loading dose followed by Vancomycin 1500 mg q24h    Indication for Treatment: Post op wound infection     Pertinent history per medical record: Admitted on 2020 for post op wound infection.  Patient was transferred from [facility] for surgical consult.  They recently had a replacement of her spinal cord stimulator.  She has been taking outpatient antibiotics but the area is becoming more painful.      Other antibiotics: Zosyn 4.5 g extended infusion     Allergies: Ativan and Tape     List concerns for renal function: Diabetes     Pertinent cultures to date:     Blood culture -- ordered      Recent Labs     20  1530   WBC 14.0*   NEUTSPOLYS 83.00*     Recent Labs     20  1530   BUN 12   CREATININE 0.79   ALBUMIN 3.8     No results for input(s): VANCOTROUGH, VANCOPEAK, VANCORANDOM in the last 72 hours.    Intake/Output Summary (Last 24 hours) at 2020 0150  Last data filed at 2020 2118  Gross per 24 hour   Intake 200 ml   Output --   Net 200 ml      /53   Pulse (!) 105   Temp 37.4 °C (99.3 °F) (Temporal)   Resp 16   SpO2 92%  Temp (24hrs), Av.6 °C (99.7 °F), Min:37.4 °C (99.3 °F), Max:37.8 °C (100.1 °F)      A/P   1. Vancomycin dose change: New start  2. Next vancomycin level: Prior to 4th dose (not ordered)  3. Goal trough: 12-16 mcg/mL  4. Comments: Patient has little risk for accumulation.  Will start a maintenance dose per protocol and check a level prior to the 4th dose.  Pharmacy will continue to follow.      German Ashley, PharmD

## 2020-01-19 NOTE — CARE PLAN
Problem: Safety  Goal: Will remain free from injury  Outcome: PROGRESSING AS EXPECTED  Intervention: Provide assistance with mobility  Flowsheets (Taken 1/19/2020 0002)  Assistance: Standby Assist  Ambulation Tolerance: Tolerates Well  Note:   Encourage to call for any assistance needed, call light within reach.     Problem: Pain Management  Goal: Pain level will decrease to patient's comfort goal  Outcome: PROGRESSING AS EXPECTED  Intervention: Educate and implement non-pharmacologic comfort measures. Examples: relaxation, distration, play therapy, activity therapy, massage, etc.  Flowsheets (Taken 1/19/2020 0002)  Intervention: Relaxation Technique; Repositioned; Rest; Medication (see MAR)  Note:   Pain assessment q 2 hours, medicated as needed, comfort measures provided.

## 2020-01-19 NOTE — OR NURSING
Respirations easy in PACU. Mediplex dressings times 2 clean and dry throughout PACU stay, gauze dressings over ALBINO sites clean and dry.  Anti-nausea meds with good effect, patient taking sips of juice and water. Patient MAEW, all extremities strong.

## 2020-01-19 NOTE — ED NOTES
Pt medicated for continued c/o HA.  Vancomycin infusing as ordered.  Pt aware of pending transfer to [facility]

## 2020-01-19 NOTE — CONSULTS
"ADULT INFECTIOUS DISEASE CONSULT     Date of Service: 1/19/2020    Consult Requested By: Inga Davila M.D.    Reason for Consultation: Spinal stimulator infection    History of Present Illness:   Anu Manuel is a 64 y.o. female with history of insulin-dependent diabetes mellitus, hypertension.  She initially had permanent spinal cord stimulator paddle placement on 10/26/2018.  She was doing well up until recently when she fell and started having electric shocking sensation just superior to the generator battery.  She was taken back to the OR on 12/16/2019 and underwent replacement of the spinal cord stimulator.  It was apparently increasingly technical difficulty due to extensive scar formation around the epidural leads.  She presented to the emergency room on 1/18/2020 with complaints of increasing low back pain and feeling ill.  Apparently after she got her staples removed she was found to have an infection around the wound and was started on p.o. Keflex.  But despite 10 days of p.o. Keflex she had worsening pain.  In the ER she had a WBC count of 14,000.  Her blood cultures are negative.  Infectious disease consult has been called for antibiotics.    Review Of Systems:  Gen.-Complains of fevers. Denies any chills.  HEENT- denies any sore throat, headache or vision changes  Pulmonary-had cough and shortness of breath which is improving  Cardiovascular- denies any chest pain, leg swelling.    GI- denies any nausea vomiting diarrhea or abdominal pain  Musculoskeletal- denies any joint pains or swelling.  Complains of back pain  Neuro- denies any weakness or sensory change  Psych-very anxious  Genitourinary- denies any frequency or dysuria        PMH:   Past Medical History:   Diagnosis Date   • Adverse effect of anesthesia     in 2008 \"throat closes up\"\"anxiety\" ?laryngospasm, kept in ICU. Pt states no problems currently 2018.    • Anesthesia     in 2008 \"throat closes up\"\"anxiety\" ?laryngospasm, kept in " "ICU. Pt states no problems currently 2018.    • Arthritis     right shoulder, hands   • Cigarette smoker quit 2013   • Dental disorder     missing teeth    • depression 8/30/2016    denies depression, states has anxiety and panic attacks   • Diabetes mellitus type 1 (HCC) 1989    insulin   • Encounter for long-term (current) use of insulin ([facility]) 9/25/2013   • Heart burn    • High cholesterol    • Hypertension    • Hypothyroidism, postsurgical 1970   • Indigestion    • Infectious disease      had hepatitis C, tested neg.   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.63) 6/10/2015         PSH:  Past Surgical History:   Procedure Laterality Date   • PB IMPLANT NEUROSTIM/ N/A 12/16/2019    Procedure: EXPLORATION AT THORACIC 8 - 9, REPLACEMENT OF  NEUROSTIMULATOR LEAD;  Surgeon: Ryan Roman M.D.;  Location: [facility];  Service: Neurosurgery   • SPINAL CORD STIMULATOR N/A 10/26/2018    Procedure: SPINAL CORD STIMULATOR;  Surgeon: Ryan Roman M.D.;  Location: [facility];  Service: Neurosurgery   • THORACIC LAMINECTOMY N/A 10/26/2018    Procedure: THORACIC LAMINECTOMY - FOR;  Surgeon: Ryan Roman M.D.;  Location: [facility];  Service: Neurosurgery   • PB INJ,FORAMEN,L/S,1 LEVEL Right 8/31/2016    Procedure: INJ-FORAMEN EPI LUM/SAC SNGL L4-5;  Surgeon: Sukhi Godfrey M.D.;  Location: [facility];  Service: Pain Management   • LUMBAR LAMINECTOMY DISKECTOMY Right 5/10/2016    Procedure: LUMBAR L4-5 HEMILAMINECTOMY DISKECTOMY ;  Surgeon: Arnold Keyes M.D.;  Location: [facility];  Service:    • CERVICAL FUSION POSTERIOR  1/16/2009    Performed by TARA CONTRERAS at [facility]   • HARDWARE REMOVAL NEURO  " 1/16/2009    Performed by TARA CONTRERAS at SURGERY [facility] ORS   • NECK EXPLORATION  1/16/2009    Performed by TARA CONTRERAS at SURGERY [facility] ORS   • SHOULDER ARTHROSCOPY W/ ROTATOR CUFF REPAIR  10/9/08    Performed by SHERLY CASTANEDA at [facility] ORS   • SHOULDER DECOMPRESSION ARTHROSCOPIC  6/17/08    Performed by SHERLY CASTANEDA at SURGERY [facility] ORS   • CLAVICLE DISTAL EXCISION  6/17/08    Performed by SHERLY CASTANEDA at SURGERY [facility] ORS   • CERVICAL DISK AND FUSION ANTERIOR  03/12/08   • HYSTERECTOMY, VAGINAL  2006   • APPENDECTOMY  2004   • THYROID LOBECTOMY  1973   • TONSILLECTOMY  1963   • ABDOMINAL HYSTERECTOMY TOTAL     • LUMPECTOMY  1976, 2005    Breast    • LUMPECTOMY         FAMILY HX:  Family History   Problem Relation Age of Onset   • Hypertension Mother    • Cancer Father        SOCIAL HX:  Social History     Socioeconomic History   • Marital status:      Spouse name: Not on file   • Number of children: Not on file   • Years of education: Not on file   • Highest education level: Not on file   Occupational History   • Not on file   Social Needs   • Financial resource strain: Not on file   • Food insecurity:     Worry: Not on file     Inability: Not on file   • Transportation needs:     Medical: Not on file     Non-medical: Not on file   Tobacco Use   • Smoking status: Current Every Day Smoker     Packs/day: 0.50     Years: 30.00     Pack years: 15.00   • Smokeless tobacco: Never Used   • Tobacco comment: 1 ppd    Substance and Sexual Activity   • Alcohol use: No     Comment:     • Drug use: No   • Sexual activity: Not on file   Lifestyle   • Physical activity:     Days per week: Not on file     Minutes per session: Not on file   • Stress: Not on file   Relationships   • Social connections:     Talks on phone: Not on file     Gets together: Not on file     Attends Sabianism service: Not on file     Active member of club or organization: Not on file     Attends  meetings of clubs or organizations: Not on file     Relationship status: Not on file   • Intimate partner violence:     Fear of current or ex partner: Not on file     Emotionally abused: Not on file     Physically abused: Not on file     Forced sexual activity: Not on file   Other Topics Concern   • Not on file   Social History Narrative   • Not on file     Social History     Tobacco Use   Smoking Status Current Every Day Smoker   • Packs/day: 0.50   • Years: 30.00   • Pack years: 15.00   Smokeless Tobacco Never Used   Tobacco Comment    1 ppd      Social History     Substance and Sexual Activity   Alcohol Use No    Comment:         Allergies/Intolerances:  Allergies   Allergen Reactions   • Ativan Unspecified      Extreme Restlessness with whole body  KMJ=[id number]   • Tape      Blisters, paper tape is ok       History reviewed with the patient    Other Current Medications:    Current Facility-Administered Medications:   •  vancomycin (VANCOCIN) 1,500 mg in  mL IVPB, 20 mg/kg, Intravenous, Q24HR, Elias Gallegos M.D., Last Rate: 125 mL/hr at 01/19/20 0957, 1,500 mg at 01/19/20 0957  •  insulin regular (HUMULIN R) injection 2-9 Units, 2-9 Units, Subcutaneous, 4X/DAY ACHS, Stopped at 01/19/20 0700 **AND** Accu-Chek ACHS, , , Q AC AND BEDTIME(S) **AND** NOTIFY MD and PharmD, , , Once **AND** glucose 4 g chewable tablet 16 g, 16 g, Oral, Q15 MIN PRN **AND** DEXTROSE 10% BOLUS 250 mL, 250 mL, Intravenous, Q15 MIN PRN, Elias Gallegos M.D.  •  ondansetron (ZOFRAN ODT) dispertab 4 mg, 4 mg, Oral, Q4HRS PRN, Elias Gallegos M.D.  •  ondansetron (ZOFRAN) syringe/vial injection 4 mg, 4 mg, Intravenous, Q4HRS PRN, Elias Gallegos M.D., 4 mg at 01/19/20 0357  •  prochlorperazine (COMPAZINE) injection 5-10 mg, 5-10 mg, Intravenous, Q4HRS PRN, Elias Gallegos M.D.  •  promethazine (PHENERGAN) suppository 12.5-25 mg, 12.5-25 mg, Rectal, Q4HRS PRN, Elias Gallegos M.D.  •  promethazine (PHENERGAN) tablet 12.5-25  mg, 12.5-25 mg, Oral, Q4HRS PRN, Elias Gallegos M.D.  •  senna-docusate (PERICOLACE or SENOKOT S) 8.6-50 MG per tablet 2 Tab, 2 Tab, Oral, BID, 2 Tab at 20 **AND** polyethylene glycol/lytes (MIRALAX) PACKET 1 Packet, 1 Packet, Oral, QDAY PRN **AND** magnesium hydroxide (MILK OF MAGNESIA) suspension 30 mL, 30 mL, Oral, QDAY PRN **AND** bisacodyl (DULCOLAX) suppository 10 mg, 10 mg, Rectal, QDAY PRN, Elias Gallegos M.D.  •  Respiratory Care per Protocol, , Nebulization, Continuous RT, Elias Gallegos M.D.  •  MD Alert...Vancomycin per Pharmacy, 1 Each, Other, PHARMACY TO DOSE, Elias Gallegos M.D.  •  atorvastatin (LIPITOR) tablet 10 mg, 10 mg, Oral, Nightly, Elias Gallegos M.D., 10 mg at 20  •  insulin glargine (LANTUS) injection 24 Units, 24 Units, Subcutaneous, QHS, Elias Gallegos M.D., 24 Units at 20  •  levothyroxine (SYNTHROID) tablet 175 mcg, 175 mcg, Oral, AM ES, Elias Gallegos M.D., 175 mcg at 20  •  losartan (COZAAR) tablet 100 mg, 100 mg, Oral, DAILY, Elias Gallegos M.D., 100 mg at 20  •  piperacillin-tazobactam (ZOSYN) 4.5 g in  mL IVPB, 4.5 g, Intravenous, Q8HRS, Elias Gallegos M.D., Stopped at 2026  •  morphine (pf) 4 MG/ML injection 2 mg, 2 mg, Intravenous, Q4HRS PRN, John Javier M.D., 2 mg at 20 0852  [unfilled]    Most Recent Vital Signs:  /61   Pulse (!) 105   Temp 36.6 °C (97.8 °F) (Temporal)   Resp 20   LMP 2005 (LMP Unknown)   SpO2 92%   Temp  Av.3 °C (99.1 °F)  Min: 36.6 °C (97.8 °F)  Max: 37.8 °C (100.1 °F)    Physical Exam:  General: Nontoxic, anxious and crying  HEENT: sclera anicteric, PERRL, EOMI, MMM, no oral lesions  Neck: supple, no lymphadenopathy  Chest: CTAB, no r/r/w, normal work of breathing.  Cardiac: Regular, no murmurs no gallops heard  Abdomen: + bowel sounds, soft, non-tender, non-distended, no HSM  Extremities: No edema. No joint  swelling.  Skin: no rashes or erythema  Neuro: Alert and oriented times 3, non-focal exam  Back-her incision on the right flank has some erythema and induration.  The superior incision has some swelling also.  The inferior midline incision seem to have healed  Pertinent Lab Results:  Recent Labs     01/18/20  1530 01/19/20  0505   WBC 14.0* 14.8*      Recent Labs     01/18/20  1530 01/19/20  0505   HEMOGLOBIN 12.7 11.4*   HEMATOCRIT 38.3 36.1*   MCV 92.1 96.3   MCH 30.5 30.4   PLATELETCT 257 225         Recent Labs     01/18/20  1530 01/19/20  0505   SODIUM 136 137   POTASSIUM 4.1 4.6   CHLORIDE 99 104   CO2 24 27   CREATININE 0.79 0.99        Recent Labs     01/18/20  1530 01/19/20  0505   ALBUMIN 3.8 3.1*        Pertinent Micro:  Results     Procedure Component Value Units Date/Time    URINALYSIS CULTURE, IF INDICATED [[id number]]     Order Status:  No result Specimen:  Urine     Blood Culture [[id number]]     Order Status:  No result Specimen:  Blood from Peripheral     Blood Culture [[id number]]     Order Status:  No result Specimen:  Blood from Peripheral         No results found for: BLOODCULTU, BLDCULT, BCHOLD     Studies:  No results found.  IMPRESSION:     Spinal stimulator infection  Diabetes mellitus  Leukocytosis  Uncontrolled blood sugars  Tobacco abuse  PLAN:   Anu Manuel is a 64 y.o. female with a history of diabetes mellitus.  Admitted with spinal stimulator infection which was placed on 12/16/2019.  She is going for surgery today.  Follow the OR cultures.  She has already been started on Vanco and Zosyn.  Switch the Zosyn to cefepime.  Continue with the supportive measures.  Control the blood sugar since her last hemoglobin A1c is 10.8.  Also  her regarding tobacco use and wound healing.      Discussed with IM. Will continue to follow    Loretta De Santiago M.D.     Please note that this dictation was created using voice recognition software. I have worked with technical experts from  [facility] to optimize the interface.  I have made every reasonable attempt to correct obvious errors, but there may be errors of grammar and possibly content that I did not discover before finalizing the note.

## 2020-01-19 NOTE — ANESTHESIA TIME REPORT
Anesthesia Start and Stop Event Times     Date Time Event    1/19/2020 1045 Ready for Procedure     1151 Anesthesia Start     1305 Anesthesia Stop        Responsible Staff    No responsible staff documented.       Preop Diagnosis (Free Text):  Pre-op Diagnosis     Irrigation and debridement of lumbar         Preop Diagnosis (Codes):    Post op Diagnosis  Nervous system device, implant, or graft infection or inflammation, initial encounter (HCC)      Premium Reason  E. Weekend    Comments:

## 2020-01-20 LAB
ANION GAP SERPL CALC-SCNC: 10 MMOL/L (ref 0–11.9)
BUN SERPL-MCNC: 20 MG/DL (ref 8–22)
CALCIUM SERPL-MCNC: 8.1 MG/DL (ref 8.5–10.5)
CHLORIDE SERPL-SCNC: 106 MMOL/L (ref 96–112)
CO2 SERPL-SCNC: 21 MMOL/L (ref 20–33)
CREAT SERPL-MCNC: 2.14 MG/DL (ref 0.5–1.4)
ERYTHROCYTE [DISTWIDTH] IN BLOOD BY AUTOMATED COUNT: 44.9 FL (ref 35.9–50)
GLUCOSE BLD-MCNC: 106 MG/DL (ref 65–99)
GLUCOSE BLD-MCNC: 126 MG/DL (ref 65–99)
GLUCOSE BLD-MCNC: 141 MG/DL (ref 65–99)
GLUCOSE SERPL-MCNC: 181 MG/DL (ref 65–99)
GRAM STN SPEC: NORMAL
GRAM STN SPEC: NORMAL
HCT VFR BLD AUTO: 34.3 % (ref 37–47)
HGB BLD-MCNC: 11.2 G/DL (ref 12–16)
MCH RBC QN AUTO: 31.6 PG (ref 27–33)
MCHC RBC AUTO-ENTMCNC: 32.7 G/DL (ref 33.6–35)
MCV RBC AUTO: 96.9 FL (ref 81.4–97.8)
PLATELET # BLD AUTO: 237 K/UL (ref 164–446)
PMV BLD AUTO: 10.3 FL (ref 9–12.9)
POTASSIUM SERPL-SCNC: 4.1 MMOL/L (ref 3.6–5.5)
RBC # BLD AUTO: 3.54 M/UL (ref 4.2–5.4)
SIGNIFICANT IND 70042: NORMAL
SIGNIFICANT IND 70042: NORMAL
SITE SITE: NORMAL
SITE SITE: NORMAL
SODIUM SERPL-SCNC: 137 MMOL/L (ref 135–145)
SOURCE SOURCE: NORMAL
SOURCE SOURCE: NORMAL
WBC # BLD AUTO: 11.4 K/UL (ref 4.8–10.8)

## 2020-01-20 PROCEDURE — 700102 HCHG RX REV CODE 250 W/ 637 OVERRIDE(OP): Performed by: HOSPITALIST

## 2020-01-20 PROCEDURE — A9270 NON-COVERED ITEM OR SERVICE: HCPCS | Performed by: HOSPITALIST

## 2020-01-20 PROCEDURE — 700102 HCHG RX REV CODE 250 W/ 637 OVERRIDE(OP): Performed by: INTERNAL MEDICINE

## 2020-01-20 PROCEDURE — 700111 HCHG RX REV CODE 636 W/ 250 OVERRIDE (IP): Performed by: HOSPITALIST

## 2020-01-20 PROCEDURE — 99232 SBSQ HOSP IP/OBS MODERATE 35: CPT | Performed by: INTERNAL MEDICINE

## 2020-01-20 PROCEDURE — 99233 SBSQ HOSP IP/OBS HIGH 50: CPT | Performed by: INTERNAL MEDICINE

## 2020-01-20 PROCEDURE — 82962 GLUCOSE BLOOD TEST: CPT | Mod: 91

## 2020-01-20 PROCEDURE — A9270 NON-COVERED ITEM OR SERVICE: HCPCS | Performed by: NEUROLOGICAL SURGERY

## 2020-01-20 PROCEDURE — 36415 COLL VENOUS BLD VENIPUNCTURE: CPT

## 2020-01-20 PROCEDURE — 700105 HCHG RX REV CODE 258: Performed by: HOSPITALIST

## 2020-01-20 PROCEDURE — A9270 NON-COVERED ITEM OR SERVICE: HCPCS | Performed by: INTERNAL MEDICINE

## 2020-01-20 PROCEDURE — 85027 COMPLETE CBC AUTOMATED: CPT

## 2020-01-20 PROCEDURE — 700102 HCHG RX REV CODE 250 W/ 637 OVERRIDE(OP): Performed by: NEUROLOGICAL SURGERY

## 2020-01-20 PROCEDURE — 770001 HCHG ROOM/CARE - MED/SURG/GYN PRIV*

## 2020-01-20 PROCEDURE — 80048 BASIC METABOLIC PNL TOTAL CA: CPT

## 2020-01-20 RX ORDER — LINEZOLID 600 MG/1
600 TABLET, FILM COATED ORAL EVERY 12 HOURS
Status: DISCONTINUED | OUTPATIENT
Start: 2020-01-20 | End: 2020-01-30 | Stop reason: HOSPADM

## 2020-01-20 RX ADMIN — PROCHLORPERAZINE EDISYLATE 10 MG: 5 INJECTION INTRAMUSCULAR; INTRAVENOUS at 20:47

## 2020-01-20 RX ADMIN — OXYCODONE HYDROCHLORIDE AND ACETAMINOPHEN 2 TABLET: 5; 325 TABLET ORAL at 02:39

## 2020-01-20 RX ADMIN — ONDANSETRON 4 MG: 4 TABLET, ORALLY DISINTEGRATING ORAL at 16:25

## 2020-01-20 RX ADMIN — LOSARTAN POTASSIUM 100 MG: 50 TABLET, FILM COATED ORAL at 04:48

## 2020-01-20 RX ADMIN — PROMETHAZINE HYDROCHLORIDE 25 MG: 25 TABLET ORAL at 10:54

## 2020-01-20 RX ADMIN — ONDANSETRON 4 MG: 4 TABLET, ORALLY DISINTEGRATING ORAL at 07:31

## 2020-01-20 RX ADMIN — INSULIN GLARGINE 24 UNITS: 100 INJECTION, SOLUTION SUBCUTANEOUS at 20:47

## 2020-01-20 RX ADMIN — SENNOSIDES AND DOCUSATE SODIUM 2 TABLET: 8.6; 5 TABLET ORAL at 04:38

## 2020-01-20 RX ADMIN — OXYCODONE HYDROCHLORIDE AND ACETAMINOPHEN 2 TABLET: 5; 325 TABLET ORAL at 10:54

## 2020-01-20 RX ADMIN — ATORVASTATIN CALCIUM 10 MG: 10 TABLET, FILM COATED ORAL at 20:42

## 2020-01-20 RX ADMIN — LINEZOLID 600 MG: 600 TABLET, FILM COATED ORAL at 16:41

## 2020-01-20 RX ADMIN — LEVOTHYROXINE SODIUM 175 MCG: 150 TABLET ORAL at 04:38

## 2020-01-20 RX ADMIN — OXYCODONE HYDROCHLORIDE AND ACETAMINOPHEN 2 TABLET: 5; 325 TABLET ORAL at 16:40

## 2020-01-20 RX ADMIN — VANCOMYCIN HYDROCHLORIDE 1500 MG: 500 INJECTION, POWDER, LYOPHILIZED, FOR SOLUTION INTRAVENOUS at 04:43

## 2020-01-20 ASSESSMENT — ENCOUNTER SYMPTOMS
EYE PAIN: 0
ABDOMINAL PAIN: 0
CONSTIPATION: 0
SHORTNESS OF BREATH: 0
VOMITING: 1
CHILLS: 1
NAUSEA: 1
EYE DISCHARGE: 0
VOMITING: 0
NAUSEA: 0
HEADACHES: 0
SPUTUM PRODUCTION: 0
CHILLS: 0
FEVER: 0
DIZZINESS: 0
DIARRHEA: 0
ORTHOPNEA: 0
PALPITATIONS: 0
BACK PAIN: 1
COUGH: 0

## 2020-01-20 NOTE — PROGRESS NOTES
Neurosurgery Progress Note    Subjective:  Denies new changes  Denies fever, chills.     Exam:  BUE/BLE str intact CDI with alex zero      BP  Min: 95/57  Max: 136/72  Pulse  Av.5  Min: 84  Max: 96  Resp  Av.8  Min: 12  Max: 22  Temp  Av.5 °C (97.7 °F)  Min: 36.3 °C (97.3 °F)  Max: 36.8 °C (98.2 °F)  SpO2  Av.5 %  Min: 95 %  Max: 99 %    No data recorded    Recent Labs     20  1530 20  0505 20  0229   WBC 14.0* 14.8* 11.4*   RBC 4.16* 3.75* 3.54*   HEMOGLOBIN 12.7 11.4* 11.2*   HEMATOCRIT 38.3 36.1* 34.3*   MCV 92.1 96.3 96.9   MCH 30.5 30.4 31.6   MCHC 33.2* 31.6* 32.7*   RDW 41.6 44.1 44.9   PLATELETCT 257 225 237   MPV 9.7 9.3 10.3     Recent Labs     20  1530 20  0505 20  0229   SODIUM 136 137 137   POTASSIUM 4.1 4.6 4.1   CHLORIDE 99 104 106   CO2 24 27 21   GLUCOSE 321* 137* 181*   BUN 12 12 20   CREATININE 0.79 0.99 2.14*   CALCIUM 8.6 8.4* 8.1*     Recent Labs     20  1646   APTT 30.2   INR 1.06           Intake/Output       20 0700 - 20 0659 20 0700 - 20 0659      5416-1086 6545-4773 Total 2764-8449 3693-8913 Total       Intake    P.O.  560  -- 560  --  -- --    P.O. 560 -- 560 -- -- --    I.V.  150  -- 150  --  -- --    IV Volume (NS) 150 -- 150 -- -- --    IV Piggyback  200  -- 200  --  -- --    Volume (mL) (vancomycin (VANCOCIN) 1,500 mg in  mL IVPB) 200 -- 200 -- -- --    Total Intake 910 -- 910 -- -- --       Output    Urine  --  -- --  --  -- --    Number of Times Voided 1 x -- 1 x -- -- --    Drains  0  0 0  --  -- --    Output (mL) (Closed/Suction Drain 1 Posterior Back Kermit Wang 10 Fr.) 0 0 0 -- -- --    Output (mL) (Closed/Suction Drain 2 Posterior Back Kermit Wang 10 Fr.) 0 0 0 -- -- --    Stool  --  -- --  --  -- --    Number of Times Stooled -- 1 x 1 x -- -- --    Blood  5  -- 5  --  -- --    Est. Blood Loss 5 -- 5 -- -- --    Total Output 5 0 5 -- -- --       Net I/O     905 0 905 -- -- --             Intake/Output Summary (Last 24 hours) at 1/20/2020 1051  Last data filed at 1/20/2020 0400  Gross per 24 hour   Intake 910 ml   Output 5 ml   Net 905 ml            • [START ON 1/21/2020] cefepime  2 g Q24HRS   • oxyCODONE-acetaminophen  1-2 Tab Q4HRS PRN   • morphine injection  2 mg Q HOUR PRN   • insulin regular  2-9 Units 4X/DAY ACHS    And   • glucose  16 g Q15 MIN PRN    And   • dextrose 10% bolus  250 mL Q15 MIN PRN   • ondansetron  4 mg Q4HRS PRN   • ondansetron  4 mg Q4HRS PRN   • prochlorperazine  5-10 mg Q4HRS PRN   • promethazine  12.5-25 mg Q4HRS PRN   • promethazine  12.5-25 mg Q4HRS PRN   • senna-docusate  2 Tab BID    And   • polyethylene glycol/lytes  1 Packet QDAY PRN    And   • magnesium hydroxide  30 mL QDAY PRN    And   • bisacodyl  10 mg QDAY PRN   • Respiratory Care per Protocol   Continuous RT   • MD Alert...Vancomycin per Pharmacy  1 Each PHARMACY TO DOSE   • atorvastatin  10 mg Nightly   • insulin glargine  24 Units QHS   • levothyroxine  175 mcg AM ES   • losartan  100 mg DAILY       Assessment and Plan:  POD #1 Removal SCS  Prophylactic anticoagulation: no         Start date/time: tbd    Monitor drain output  Await cx  Continue pain control

## 2020-01-20 NOTE — PROGRESS NOTES
Hospital Medicine Daily Progress Note    Date of Service  1/20/2020    Chief Complaint  64 y.o. female admitted 1/18/2020 with wound infection, back pain     Hospital Course    This is a 63 y/o F with a past medical history of Insulin dependent diabetes mellitus, hypertension, recently underwent a replacement of her spinal cord stimulator on 12/16/2019. She had developed an nearby infection around the stimulator for which she was given outpatient antibiotics including Keflex, however despite taking these antibiotics patient now complains of worsening pain around that area. Pt admitted for further treatment. Neurosurgery has been consulted appreciate rec.         Interval Problem Update  No overnight events, had surgery yesterday by neurosurgery wit removal of infected spine stimulator. Having nausea this AM, afebrile  Neurosurgery and ID following appreciate rec.     Consultants/Specialty  Neurosurgery   Infection disease     Code Status  Full Code     Disposition  TBD    Review of Systems  Review of Systems   Constitutional: Positive for chills. Negative for fever.   HENT: Negative for ear pain and tinnitus.    Eyes: Negative for pain and discharge.   Respiratory: Negative for cough, sputum production and shortness of breath.    Cardiovascular: Negative for chest pain, palpitations and orthopnea.   Gastrointestinal: Negative for abdominal pain, nausea and vomiting.   Genitourinary: Negative for dysuria and urgency.   Musculoskeletal: Positive for back pain.   Neurological: Negative for dizziness and headaches.   All other systems reviewed and are negative.       Physical Exam  Temp:  [36.3 °C (97.3 °F)-36.8 °C (98.2 °F)] 36.6 °C (97.8 °F)  Pulse:  [84-96] 90  Resp:  [12-22] 18  BP: ()/(57-73) 125/73  SpO2:  [95 %-99 %] 95 %    Physical Exam  Vitals signs and nursing note reviewed.   HENT:      Head: Normocephalic and atraumatic.   Eyes:      General: No scleral icterus.        Right eye: No discharge.          Left eye: No discharge.      Conjunctiva/sclera: Conjunctivae normal.   Cardiovascular:      Rate and Rhythm: Normal rate.      Heart sounds: No murmur. No gallop.    Pulmonary:      Effort: Pulmonary effort is normal.      Breath sounds: Normal breath sounds. No stridor.   Abdominal:      General: Bowel sounds are normal. There is no distension.      Tenderness: There is no tenderness.   Skin:     General: Skin is warm.      Coloration: Skin is not jaundiced.      Findings: Erythema (at the surgical incision ) present.   Neurological:      Mental Status: She is alert and oriented to person, place, and time. Mental status is at baseline.   Psychiatric:         Mood and Affect: Mood normal.         Fluids    Intake/Output Summary (Last 24 hours) at 1/20/2020 1107  Last data filed at 1/20/2020 0400  Gross per 24 hour   Intake 910 ml   Output 5 ml   Net 905 ml       Laboratory  Recent Labs     01/18/20  1530 01/19/20  0505 01/20/20  0229   WBC 14.0* 14.8* 11.4*   RBC 4.16* 3.75* 3.54*   HEMOGLOBIN 12.7 11.4* 11.2*   HEMATOCRIT 38.3 36.1* 34.3*   MCV 92.1 96.3 96.9   MCH 30.5 30.4 31.6   MCHC 33.2* 31.6* 32.7*   RDW 41.6 44.1 44.9   PLATELETCT 257 225 237   MPV 9.7 9.3 10.3     Recent Labs     01/18/20  1530 01/19/20  0505 01/20/20  0229   SODIUM 136 137 137   POTASSIUM 4.1 4.6 4.1   CHLORIDE 99 104 106   CO2 24 27 21   GLUCOSE 321* 137* 181*   BUN 12 12 20   CREATININE 0.79 0.99 2.14*   CALCIUM 8.6 8.4* 8.1*     Recent Labs     01/19/20  1646   APTT 30.2   INR 1.06               Imaging  No orders to display        Assessment/Plan  * Post-operative wound abscess- (present on admission)  Assessment & Plan  Involving her spinal stimulator area.  On IV  Vancomycin and Zosyn   Neurosurgery following had surgery and removal of the spine stimulator yesterday  ID also following  Appreciate rec.     Type 1 diabetes mellitus with neurological manifestations, uncontrolled (HCC)- (present on admission)  Assessment &  Plan  Uncontrolled  Last a1c was 10.7  Cont Degludec 24U qhs, we will titrate  Continue Insulin-sliding scale, accu-checks and hypoglycemia protocol.  Monitor     Hypothyroidism, postsurgical- (present on admission)  Assessment & Plan  Resume home dose of synthroid    Tobacco abuse- (present on admission)  Assessment & Plan  Counseled on cessation        VTE prophylaxis: scd

## 2020-01-20 NOTE — CARE PLAN
Problem: Safety  Goal: Will remain free from injury  Outcome: PROGRESSING AS EXPECTED     Problem: Infection  Goal: Will remain free from infection  Outcome: PROGRESSING AS EXPECTED     Problem: Pain Management  Goal: Pain level will decrease to patient's comfort goal  Outcome: PROGRESSING AS EXPECTED

## 2020-01-20 NOTE — CARE PLAN
Problem: Knowledge Deficit  Goal: Knowledge of the prescribed therapeutic regimen will improve  Outcome: PROGRESSING AS EXPECTED     Problem: Pain Management  Goal: Pain level will decrease to patient's comfort goal  Outcome: PROGRESSING AS EXPECTED

## 2020-01-20 NOTE — PROGRESS NOTES
Beginning of noc shift episode of emesis compazine IV given at 1943, having anxiety/episode of crying.  Check blood sugar at 2006  no coverage given.   At 2030 IV site infiltrated, new IV line 22 gauge left forearm/infusing/TKO.  Surgical site dressing intact/dry, ALBINO (2 sites/back) drain no output.  Toileted x 1, voided, ambulate FWW/able to bear weight/unsteady/1 staff assist.  Change linen/kept patient comfortable/breakthrough pain Percocet 2 tabs complain of back pain/helpful.  02 sat RA 94&, no complain of SOB, no chest pain, no respiratory distress.

## 2020-01-20 NOTE — PROGRESS NOTES
LATE ENTRY    Pt is awake and alert. Reports of tolerable pain, refusing pain meds at this time due to nausea/vomiting. She has had 2+ episodes of emesis since change of shift. Emesis consists of bile. Pt reports that she has not been eating the last few days. This RN encouraged pt to eat some breakfast and to eat slowly. NOC RN administered Zofran at change of shift. Pt reports positive results, and reports that she also believes that sudden movements may also be causing her nausea/vomiting. Pt's IV site was having +3 phlebitis but resolved once IV Vancomycin was done running. Pt was only reporting of itching when site was red, no pain. POC was discussed and all needs were met at this time.

## 2020-01-20 NOTE — PROGRESS NOTES
Pt back from surgery at approx 1510. Pt c/o back pain- Percocet given with +results. ALBINO drains x2 in place, compressed. Silver mepilex to back- CDI. Pt ambulated to BR with SBA. Diet ordered.

## 2020-01-20 NOTE — PROGRESS NOTES
Pt was reporting of pain and improving nausea. Was given Phenergen and Oxycodone at 1054. @ 1100 proceeded to vomit into emesis bag. No pills were found in emesis bag.

## 2020-01-20 NOTE — PROGRESS NOTES
Pharmacy Kinetics 64 y.o. female on vancomycin day # 3 2020    Currently on Vancomycin 1500 mg IV q24h  Provider specified end date: TBD    Indication for Treatment: Post-op CNS infx vs SSTI    Pertinent history per medical record: Admitted on 2020 for post-op wound infx. On  pt had her spinal cord stimulator replaced. After pt discharged home, she was noted to have an infx around the surgical site during her staples being removed, and she was given a 10 day course of Keflex. Due to increasing pain and erythema, she presented to the ER. Pt was taken to the OR on  for washout and removal of the spinal cord stimulator; operative notes state infx appeared to be suprafascial (didn't appear to extend below the fascia). ID has been consulted for abx management. PMH significant for DM. She has no prior history of vancomycin dosing with a trough found in Epic.    Other antibiotics: Cefepime     Allergies: Ativan and Tape     Concerns for renal function: Age, DM, SCr doubled overnight ()     Pertinent cultures to date:   ·  Blood x2: NGTD  · Awaiting surgical culture to start processing - called micro lab and they have cultures as of this AM      Recent Labs     20  1530 20  0505 20  0229   WBC 14.0* 14.8* 11.4*   NEUTSPOLYS 83.00* 80.10*  --      Recent Labs     20  1530 20  0505 20  0229   BUN 12 12 20   CREATININE 0.79 0.99 2.14*   ALBUMIN 3.8 3.1*  --        Intake/Output Summary (Last 24 hours) at 2020 0848  Last data filed at 2020 0400  Gross per 24 hour   Intake 910 ml   Output 5 ml   Net 905 ml      /73   Pulse 90   Temp 36.6 °C (97.8 °F) (Temporal)   Resp 18   Wt 78.1 kg (172 lb 2.9 oz)   SpO2 95%  Temp (24hrs), Av.5 °C (97.7 °F), Min:36.3 °C (97.3 °F), Max:36.8 °C (98.2 °F)      A/P   1. Vancomycin dose change: Stop scheduled dosing given RHEA  2. Next vancomycin level: Tomorrow at 0500, ~24 hours from previous dose of ~20  mg/kg  3. Goal trough: 12-16 mcg/mL  4. Comments: Pt clinically improving - afebrile and leukocytosis trending down. Pt did have SCr double overnight, but no obvious cause of this. For this reason, will stop scheduled dosing and check a random 24 hour level tomorrow    Jeanine Tidwell, PharmD, BCPS

## 2020-01-20 NOTE — PROGRESS NOTES
Infectious Disease Progress Note    Author: Marianna Reeves M.D. Date & Time of service: 2020  2:35 PM    Chief Complaint:  Spinal stimulator infection    Interval History:      Review of Systems:  Review of Systems   Constitutional: Negative for chills, fever and malaise/fatigue.   Gastrointestinal: Positive for nausea and vomiting. Negative for abdominal pain, constipation and diarrhea.   Musculoskeletal: Positive for back pain.       Hemodynamics:  Temp (24hrs), Av.4 °C (97.6 °F), Min:36.3 °C (97.3 °F), Max:36.8 °C (98.2 °F)  Temperature: 36.6 °C (97.8 °F)  Pulse  Av.2  Min: 84  Max: 105   Blood Pressure: 125/73       Physical Exam:  Physical Exam  Constitutional:       Appearance: Normal appearance.   Cardiovascular:      Rate and Rhythm: Normal rate and regular rhythm.      Pulses: Normal pulses.      Heart sounds: Normal heart sounds.   Pulmonary:      Effort: Pulmonary effort is normal.      Breath sounds: Normal breath sounds.   Abdominal:      General: Abdomen is flat. Bowel sounds are normal. There is no distension.      Palpations: Abdomen is soft.      Tenderness: There is tenderness. There is no guarding.   Musculoskeletal: Normal range of motion.         General: No swelling.      Right lower leg: No edema.      Left lower leg: No edema.      Comments: Back with bandaging in place, no surrounding areas of erythema or significant tenderness.  2 drains in place with no fluid and bulbs.   Skin:     General: Skin is warm and dry.   Neurological:      General: No focal deficit present.      Mental Status: She is alert and oriented to person, place, and time.   Psychiatric:         Mood and Affect: Mood normal.         Behavior: Behavior normal.         Meds:    Current Facility-Administered Medications:   •  [START ON 2020] cefepime  •  oxyCODONE-acetaminophen  •  morphine injection  •  insulin regular **AND** Accu-Chek ACHS **AND** NOTIFY MD and PharmD **AND** glucose **AND**  dextrose 10% bolus  •  ondansetron  •  ondansetron  •  prochlorperazine  •  promethazine  •  promethazine  •  senna-docusate **AND** polyethylene glycol/lytes **AND** magnesium hydroxide **AND** bisacodyl  •  Respiratory Care per Protocol  •  MD Alert...Vancomycin per Pharmacy  •  atorvastatin  •  insulin glargine  •  levothyroxine    Labs:  Recent Labs     01/18/20  1530 01/19/20  0505 01/20/20 0229   WBC 14.0* 14.8* 11.4*   RBC 4.16* 3.75* 3.54*   HEMOGLOBIN 12.7 11.4* 11.2*   HEMATOCRIT 38.3 36.1* 34.3*   MCV 92.1 96.3 96.9   MCH 30.5 30.4 31.6   RDW 41.6 44.1 44.9   PLATELETCT 257 225 237   MPV 9.7 9.3 10.3   NEUTSPOLYS 83.00* 80.10*  --    LYMPHOCYTES 6.20* 6.50*  --    MONOCYTES 9.60 11.70  --    EOSINOPHILS 0.20 0.50  --    BASOPHILS 0.50 0.70  --      Recent Labs     01/18/20  1530 01/19/20  0505 01/20/20 0229   SODIUM 136 137 137   POTASSIUM 4.1 4.6 4.1   CHLORIDE 99 104 106   CO2 24 27 21   GLUCOSE 321* 137* 181*   BUN 12 12 20     Recent Labs     01/18/20  1530 01/19/20  0505 01/20/20 0229   ALBUMIN 3.8 3.1*  --    TBILIRUBIN 0.3 0.7  --    ALKPHOSPHAT 116* 90  --    TOTPROTEIN 6.7 5.6*  --    ALTSGPT 14 12  --    ASTSGOT 12 11*  --    CREATININE 0.79 0.99 2.14*       Imaging:  No results found.    Micro:  Results     Procedure Component Value Units Date/Time    Anaerobic Culture [[id number]] Collected:  01/19/20 1224    Order Status:  Completed Specimen:  Other Updated:  01/20/20 1137    CULTURE TISSUE W/ GRM STAIN [[id number]] Collected:  01/19/20 1224    Order Status:  Completed Specimen:  Other Updated:  01/20/20 1137    CULTURE TISSUE W/ GRM STAIN [[id number]] Collected:  01/19/20 1223    Order Status:  Completed Specimen:  Other Updated:  01/20/20 1136    Anaerobic Culture [[id number]] Collected:  01/19/20 1223    Order Status:  Completed Specimen:  Other Updated:  01/20/20 1136    URINALYSIS CULTURE, IF INDICATED [[id number]]     Order Status:  No result Specimen:  Urine     Blood Culture [[id number]]      Order Status:  No result Specimen:  Blood from Peripheral     Blood Culture [[id number]]     Order Status:  No result Specimen:  Blood from Peripheral           Assessment:  Active Hospital Problems    Diagnosis   • *Post-operative wound abscess [T81.49XA]   • Type 1 diabetes mellitus with neurological manifestations, uncontrolled (HCC) [E10.49, E10.65]   • Hypothyroidism, postsurgical [E89.0]   • Tobacco abuse [Z72.0]     Interval 24 hour assessment:      AF, O2 2 L NC  Labs reviewed  Imaging personally reviewed both images and report.   Studies reviewed  Micro reviewed    Pt vancomycin transition to to linezolid due to new RHEA.  We will continue cefepime for now so far only GPC's on culture so can likely de-escalate further soon.  She had significant nausea with vomiting earlier today but states it is improving now.    Assessment:    Anu Manuel is a 64 y.o. female with history of insulin-dependent diabetes mellitus, hypertension.  She initially had permanent spinal cord stimulator paddle placement on 10/26/2018.  She recently started having electric shocking sensation just superior to the generator battery.  She was taken to the OR on 12/16/2019 and underwent replacement of the spinal cord stimulator. She presented to the ER on 1/18/2020 with complaints of increasing low back pain. Infection had been noted when she got her staples removed and she had been given Keflex. Despite 10 days of p.o. Keflex she had worsening pain.  In the ER she had a WBC count of 14,000,  temp of 100.1.  She was initially started on vancomycin and Zosyn.  The Zosyn was transitioned to cefepime on 1/19.   Vancomycin transition to linezolid on 1/20.       Spinal stimulator infection, worsened despite Keflex  -Stimulator replaced on 12/16/2019  -OR on 1/19, per op report thoracic incision with material under pressure, expressed and sent for cultures.  Infection.  Be suprafascial and did not obviously extend below the fascia  per note.  Right flank incision also opened and purulent material expressed.  The leads and generator were removed.   -Blood cultures from 1/18 no growth to date    Leukocytosis, ongoing    RHEA, new-possibly secondary to neurotoxicity related to vancomycin plus Zosyn    Diabetes, uncontrolled  -A1C 10.8     Tobacco abuse    --- Stop vancomycin transition to linezolid and continue cefepime for now-Per op note deep structures did not appear to be involved and all hardware has been removed  --- Follow-up OR cultures     discussed with internal medicine, Dr. Horton.  ID will follow.

## 2020-01-20 NOTE — DISCHARGE PLANNING
Care Transition Team Assessment      Anticipated Discharge Disposition: Home    Action: RN CM assessed pt at bedside and verified facesheet demographics.  Pt reports she lives in a single story home [place] her [place].  She is independent with ADLs and IADLs and reports using no DME. Pt has prescription drug coverage, uses [facility] pharmacy at [facility], and reports no financial barriers at this time. Pt's PCP is Jarrell Yates MD. Pt anticipates discharging home when medically cleared, pending antibiotics plan.     Barriers to Discharge: Medical clearance pending antibiotics plan; results of blood cultures pending.     Plan: Await medical clearance for discharge home with outpatient infusion vs home infusion.     Information Source  Orientation : Oriented x 4  Information Given By: Patient  Who is responsible for making decisions for patient? : Patient    Readmission Evaluation  Is this a readmission?: Yes - unplanned readmission  Why do you think you were readmitted?: Post op wound infection  Was an appointment arranged for you prior to discharge?: No  Were there new prescriptions you were supposed to fill after you were discharged?: Yes, prescriptions filled  Did you understand your discharge instructions?: Yes  Did you have enough support after your last discharge?: Yes    Elopement Risk  Legal Hold: No  Ambulatory or Self Mobile in Wheelchair: Yes  Disoriented: No  Psychiatric Symptoms: None  History of Wandering: No  Elopement this Admit: No  Vocalizing Wanting to Leave: No  Displays Behaviors, Body Language Wanting to Leave: No-Not at Risk for Elopement  Elopement Risk: Not at Risk for Elopement    Interdisciplinary Discharge Planning  Primary Care Physician: Jarrell Yates MD  Lives with - Patient's Self Care Capacity: Spouse  Patient or legal guardian wants to designate a caregiver (see row info): No  Support Systems: Spouse / Significant Other, Family Member(s), Friends / Neighbors  Housing / Facility: [address]  Do You Take your Prescribed Medications Regularly: Yes  Able to Return to Previous ADL's: Yes  Mobility Issues: No  Prior Services: None, Home-Independent  Patient Expects to be Discharged to:: Home  Assistance Needed: Unknown at this Time  Durable Medical Equipment: Not Applicable    Discharge Preparedness  What is your plan after discharge?: Home with help  What are your discharge supports?: Spouse  Prior Functional Level: Ambulatory, Drives Self, Independent with Activities of Daily Living, Independent with Medication Management  Difficulity with ADLs: None  Difficulity with IADLs: None    Functional Assesment  Prior Functional Level: Ambulatory, Drives Self, Independent with Activities of Daily Living, Independent with Medication Management    Finances  Financial Barriers to Discharge: No  Prescription Coverage: Yes    Vision / Hearing Impairment  Vision Impairment : Yes  Right Eye Vision: Impaired, Wears Contacts  Left Eye Vision: Impaired, Wears Glasses  Hearing Impairment : No         Advance Directive  Advance Directive?: None  Advance Directive offered?: AD Booklet refused    Domestic Abuse  Have you ever been the victim of abuse or violence?: No  Physical Abuse or Sexual Abuse: No  Verbal Abuse or Emotional Abuse: No  Possible Abuse Reported to::          Discharge Risks or Barriers  Discharge risks or barriers?: No    Anticipated Discharge Information  Anticipated discharge disposition: Home, Infusion Center (outpatient), Infusion / Enteral - home  Discharge Address: [address] [id number]  Discharge Contact Phone Number: [phone]

## 2020-01-21 ENCOUNTER — APPOINTMENT (OUTPATIENT)
Dept: RADIOLOGY | Facility: MEDICAL CENTER | Age: 65
DRG: 028 | End: 2020-01-21
Attending: INTERNAL MEDICINE
Payer: MEDICARE

## 2020-01-21 PROBLEM — N17.9 AKI (ACUTE KIDNEY INJURY) (HCC): Status: ACTIVE | Noted: 2020-01-21

## 2020-01-21 LAB
ANION GAP SERPL CALC-SCNC: 10 MMOL/L (ref 0–11.9)
BUN SERPL-MCNC: 26 MG/DL (ref 8–22)
CALCIUM SERPL-MCNC: 8.1 MG/DL (ref 8.5–10.5)
CHLORIDE SERPL-SCNC: 107 MMOL/L (ref 96–112)
CO2 SERPL-SCNC: 24 MMOL/L (ref 20–33)
CREAT SERPL-MCNC: 3.91 MG/DL (ref 0.5–1.4)
CRP SERPL HS-MCNC: 14.87 MG/DL (ref 0–0.75)
ERYTHROCYTE [DISTWIDTH] IN BLOOD BY AUTOMATED COUNT: 43.9 FL (ref 35.9–50)
ERYTHROCYTE [SEDIMENTATION RATE] IN BLOOD BY WESTERGREN METHOD: 34 MM/HOUR (ref 0–30)
GLUCOSE BLD-MCNC: 126 MG/DL (ref 65–99)
GLUCOSE BLD-MCNC: 136 MG/DL (ref 65–99)
GLUCOSE BLD-MCNC: 142 MG/DL (ref 65–99)
GLUCOSE BLD-MCNC: 88 MG/DL (ref 65–99)
GLUCOSE BLD-MCNC: 99 MG/DL (ref 65–99)
GLUCOSE SERPL-MCNC: 121 MG/DL (ref 65–99)
HCT VFR BLD AUTO: 34.6 % (ref 37–47)
HGB BLD-MCNC: 11 G/DL (ref 12–16)
MCH RBC QN AUTO: 30.8 PG (ref 27–33)
MCHC RBC AUTO-ENTMCNC: 31.8 G/DL (ref 33.6–35)
MCV RBC AUTO: 96.9 FL (ref 81.4–97.8)
PLATELET # BLD AUTO: 254 K/UL (ref 164–446)
PMV BLD AUTO: 9.8 FL (ref 9–12.9)
POTASSIUM SERPL-SCNC: 4.4 MMOL/L (ref 3.6–5.5)
RBC # BLD AUTO: 3.57 M/UL (ref 4.2–5.4)
SODIUM SERPL-SCNC: 141 MMOL/L (ref 135–145)
WBC # BLD AUTO: 9.6 K/UL (ref 4.8–10.8)

## 2020-01-21 PROCEDURE — 700105 HCHG RX REV CODE 258: Performed by: INTERNAL MEDICINE

## 2020-01-21 PROCEDURE — A9270 NON-COVERED ITEM OR SERVICE: HCPCS | Performed by: HOSPITALIST

## 2020-01-21 PROCEDURE — 700102 HCHG RX REV CODE 250 W/ 637 OVERRIDE(OP): Performed by: NEUROLOGICAL SURGERY

## 2020-01-21 PROCEDURE — 80048 BASIC METABOLIC PNL TOTAL CA: CPT

## 2020-01-21 PROCEDURE — 99221 1ST HOSP IP/OBS SF/LOW 40: CPT | Performed by: INTERNAL MEDICINE

## 2020-01-21 PROCEDURE — 700111 HCHG RX REV CODE 636 W/ 250 OVERRIDE (IP): Performed by: INTERNAL MEDICINE

## 2020-01-21 PROCEDURE — 36415 COLL VENOUS BLD VENIPUNCTURE: CPT

## 2020-01-21 PROCEDURE — 85652 RBC SED RATE AUTOMATED: CPT

## 2020-01-21 PROCEDURE — A9270 NON-COVERED ITEM OR SERVICE: HCPCS | Performed by: INTERNAL MEDICINE

## 2020-01-21 PROCEDURE — 700111 HCHG RX REV CODE 636 W/ 250 OVERRIDE (IP): Performed by: HOSPITALIST

## 2020-01-21 PROCEDURE — 99233 SBSQ HOSP IP/OBS HIGH 50: CPT | Performed by: INTERNAL MEDICINE

## 2020-01-21 PROCEDURE — 770001 HCHG ROOM/CARE - MED/SURG/GYN PRIV*

## 2020-01-21 PROCEDURE — 76775 US EXAM ABDO BACK WALL LIM: CPT

## 2020-01-21 PROCEDURE — A9270 NON-COVERED ITEM OR SERVICE: HCPCS | Performed by: NEUROLOGICAL SURGERY

## 2020-01-21 PROCEDURE — 86140 C-REACTIVE PROTEIN: CPT

## 2020-01-21 PROCEDURE — 700102 HCHG RX REV CODE 250 W/ 637 OVERRIDE(OP): Performed by: INTERNAL MEDICINE

## 2020-01-21 PROCEDURE — 700102 HCHG RX REV CODE 250 W/ 637 OVERRIDE(OP): Performed by: HOSPITALIST

## 2020-01-21 PROCEDURE — 99233 SBSQ HOSP IP/OBS HIGH 50: CPT | Performed by: HOSPITALIST

## 2020-01-21 PROCEDURE — 85027 COMPLETE CBC AUTOMATED: CPT

## 2020-01-21 PROCEDURE — 82962 GLUCOSE BLOOD TEST: CPT

## 2020-01-21 RX ORDER — ALPRAZOLAM 0.25 MG/1
0.25 TABLET ORAL 2 TIMES DAILY PRN
Status: DISCONTINUED | OUTPATIENT
Start: 2020-01-21 | End: 2020-01-30 | Stop reason: HOSPADM

## 2020-01-21 RX ADMIN — ALPRAZOLAM 0.25 MG: 0.25 TABLET ORAL at 17:00

## 2020-01-21 RX ADMIN — INSULIN GLARGINE 24 UNITS: 100 INJECTION, SOLUTION SUBCUTANEOUS at 20:26

## 2020-01-21 RX ADMIN — ONDANSETRON 4 MG: 4 TABLET, ORALLY DISINTEGRATING ORAL at 04:10

## 2020-01-21 RX ADMIN — OXYCODONE HYDROCHLORIDE AND ACETAMINOPHEN 1 TABLET: 5; 325 TABLET ORAL at 04:10

## 2020-01-21 RX ADMIN — POLYETHYLENE GLYCOL 3350 1 PACKET: 17 POWDER, FOR SOLUTION ORAL at 17:00

## 2020-01-21 RX ADMIN — LINEZOLID 600 MG: 600 TABLET, FILM COATED ORAL at 04:10

## 2020-01-21 RX ADMIN — LEVOTHYROXINE SODIUM 175 MCG: 150 TABLET ORAL at 04:10

## 2020-01-21 RX ADMIN — PROCHLORPERAZINE EDISYLATE 10 MG: 5 INJECTION INTRAMUSCULAR; INTRAVENOUS at 14:53

## 2020-01-21 RX ADMIN — ATORVASTATIN CALCIUM 10 MG: 10 TABLET, FILM COATED ORAL at 20:21

## 2020-01-21 RX ADMIN — SENNOSIDES AND DOCUSATE SODIUM 2 TABLET: 8.6; 5 TABLET ORAL at 17:00

## 2020-01-21 RX ADMIN — SENNOSIDES AND DOCUSATE SODIUM 2 TABLET: 8.6; 5 TABLET ORAL at 04:10

## 2020-01-21 RX ADMIN — NICOTINE POLACRILEX 2 MG: 2 GUM, CHEWING BUCCAL at 20:21

## 2020-01-21 RX ADMIN — CEFEPIME 2 G: 2 INJECTION, POWDER, FOR SOLUTION INTRAVENOUS at 04:10

## 2020-01-21 RX ADMIN — LINEZOLID 600 MG: 600 TABLET, FILM COATED ORAL at 17:00

## 2020-01-21 ASSESSMENT — ENCOUNTER SYMPTOMS
PALPITATIONS: 0
BACK PAIN: 1
VOMITING: 0
EYE DISCHARGE: 0
SPUTUM PRODUCTION: 0
EYE PAIN: 0
MYALGIAS: 0
ORTHOPNEA: 0
ABDOMINAL PAIN: 1
CHILLS: 0
FEVER: 0
ABDOMINAL PAIN: 0
COUGH: 0
SHORTNESS OF BREATH: 0
BACK PAIN: 0
NAUSEA: 0
CHILLS: 1
HEADACHES: 0
DIZZINESS: 0
DIARRHEA: 0
NAUSEA: 1
CONSTIPATION: 0

## 2020-01-21 ASSESSMENT — PATIENT HEALTH QUESTIONNAIRE - PHQ9
SUM OF ALL RESPONSES TO PHQ9 QUESTIONS 1 AND 2: 0
1. LITTLE INTEREST OR PLEASURE IN DOING THINGS: NOT AT ALL
2. FEELING DOWN, DEPRESSED, IRRITABLE, OR HOPELESS: NOT AT ALL

## 2020-01-21 NOTE — CARE PLAN
Problem: Communication  Goal: The ability to communicate needs accurately and effectively will improve  1/21/2020 0932 by Jamila Escamilla R.N.  Outcome: PROGRESSING AS EXPECTED  Note:   Pt is able to effectively communicate needs and concerns. No language, speech, or cognitive barriers to communication. Pt utilizes call light appropriately to call for assistance. Family is at bedside and able to help with majority of pt's needs. Whiteboard was updated and call light is within reach. Will continue to monitor for additional communication needs and provide interventions as needed.       1/21/2020 0912 by Jamila Escamilla R.N.  Outcome: PROGRESSING AS EXPECTED     Problem: Safety  Goal: Will remain free from injury  Outcome: PROGRESSING AS EXPECTED  Note:   Fall risk was assessed, pt was educated on fall prevention and safety. Fall precautions are in place: bed alarm/frame alarm is on, upper bed rails are up, treaded socks are on and bed is locked and in lowest position. Pt utilizes appropriate assistive devices and braces when out of bed. Pt calls for assistance when getting out of bed. Appropriate signage and bracelet are in place. Will continue to assess for additional risk factors and implement appropriate interventions.

## 2020-01-21 NOTE — PROGRESS NOTES
Emesis x q phenergan IV given/effective.  No verbalization of pain or discomfort at this time/verbalization of feeling better.  Surgical site dressing intact and dry/ALBINO drain intact/no output.  Walking independently while in the room with nursing supervision, tolerating.  Sponge bath, complete linen change, bruised teeth.  No complain of SOB, no chest pain, no respiratory distress.

## 2020-01-21 NOTE — PROGRESS NOTES
Infectious Disease Progress Note    Author: Marianna Reeves M.D. Date & Time of service: 2020  9:01 AM    Chief Complaint:  Spinal stimulator infection     Interval History:    AF, O2 2 L NC, vancomycin transition to to linezolid due to new RHEA.  We will continue cefepime for now so far only GPC's on culture so can likely de-escalate further soon.  She had significant nausea with vomiting earlier today but states it is improving now.     Review of Systems:  Review of Systems   Constitutional: Negative for chills and fever.   Gastrointestinal: Positive for abdominal pain and nausea. Negative for constipation, diarrhea and vomiting.   Musculoskeletal: Negative for back pain and myalgias.       Hemodynamics:  Temp (24hrs), Av.6 °C (97.9 °F), Min:36.2 °C (97.2 °F), Max:37 °C (98.6 °F)  Temperature: 36.2 °C (97.2 °F)  Pulse  Av.4  Min: 84  Max: 105   Blood Pressure: 132/73       Physical Exam:  Physical Exam  Constitutional:       Appearance: Normal appearance.   Cardiovascular:      Rate and Rhythm: Normal rate and regular rhythm.      Heart sounds: Normal heart sounds.   Pulmonary:      Effort: Pulmonary effort is normal.      Breath sounds: Normal breath sounds.   Abdominal:      General: Abdomen is flat. Bowel sounds are normal.      Palpations: Abdomen is soft.      Tenderness: There is tenderness.      Comments: RUQ ttp    Musculoskeletal:      Comments: Bandaging in place on back and flank, no surrounding erythema, warmth or significant tenderness.  2 drains in place with no output.   Skin:     General: Skin is warm and dry.   Neurological:      General: No focal deficit present.      Mental Status: She is alert and oriented to person, place, and time.   Psychiatric:         Mood and Affect: Mood normal.         Behavior: Behavior normal.         Meds:    Current Facility-Administered Medications:   •  cefepime  •  linezolid  •  oxyCODONE-acetaminophen  •  morphine injection  •  insulin  regular **AND** Accu-Chek ACHS **AND** NOTIFY MD and PharmD **AND** glucose **AND** dextrose 10% bolus  •  ondansetron  •  ondansetron  •  prochlorperazine  •  promethazine  •  promethazine  •  senna-docusate **AND** polyethylene glycol/lytes **AND** magnesium hydroxide **AND** bisacodyl  •  Respiratory Care per Protocol  •  atorvastatin  •  insulin glargine  •  levothyroxine    Labs:  Recent Labs     01/18/20  1530 01/19/20  0505 01/20/20  0229 01/21/20  0230   WBC 14.0* 14.8* 11.4* 9.6   RBC 4.16* 3.75* 3.54* 3.57*   HEMOGLOBIN 12.7 11.4* 11.2* 11.0*   HEMATOCRIT 38.3 36.1* 34.3* 34.6*   MCV 92.1 96.3 96.9 96.9   MCH 30.5 30.4 31.6 30.8   RDW 41.6 44.1 44.9 43.9   PLATELETCT 257 225 237 254   MPV 9.7 9.3 10.3 9.8   NEUTSPOLYS 83.00* 80.10*  --   --    LYMPHOCYTES 6.20* 6.50*  --   --    MONOCYTES 9.60 11.70  --   --    EOSINOPHILS 0.20 0.50  --   --    BASOPHILS 0.50 0.70  --   --      Recent Labs     01/19/20  0505 01/20/20  0229 01/21/20  0230   SODIUM 137 137 141   POTASSIUM 4.6 4.1 4.4   CHLORIDE 104 106 107   CO2 27 21 24   GLUCOSE 137* 181* 121*   BUN 12 20 26*     Recent Labs     01/18/20  1530 01/19/20  0505 01/20/20  0229 01/21/20  0230   ALBUMIN 3.8 3.1*  --   --    TBILIRUBIN 0.3 0.7  --   --    ALKPHOSPHAT 116* 90  --   --    TOTPROTEIN 6.7 5.6*  --   --    ALTSGPT 14 12  --   --    ASTSGOT 12 11*  --   --    CREATININE 0.79 0.99 2.14* 3.91*       Imaging:  No results found.    Micro:  Results     Procedure Component Value Units Date/Time    GRAM STAIN [[id number]] Collected:  01/19/20 1224    Order Status:  Completed Specimen:  Tissue Updated:  01/20/20 1725     Significant Indicator .     Source TISS     Site Right Flank     Gram Stain Result Rare WBCs.  No organisms seen.      Narrative:       Surgery Specimen    GRAM STAIN [[id number]] Collected:  01/19/20 1223    Order Status:  Completed Specimen:  Tissue Updated:  01/20/20 1548     Significant Indicator .     Source TISS     Site Thoracic  Subcutaneous     Gram Stain Result Many WBCs.  Moderate Gram positive cocci.      Narrative:       Surgery Specimen    Anaerobic Culture [[id number]] Collected:  01/19/20 1224    Order Status:  Completed Specimen:  Other Updated:  01/20/20 1137    CULTURE TISSUE W/ GRM STAIN [[id number]] Collected:  01/19/20 1224    Order Status:  Completed Specimen:  Other Updated:  01/20/20 1137    CULTURE TISSUE W/ GRM STAIN [[id number]] Collected:  01/19/20 1223    Order Status:  Completed Specimen:  Other Updated:  01/20/20 1136    Anaerobic Culture [[id number]] Collected:  01/19/20 1223    Order Status:  Completed Specimen:  Other Updated:  01/20/20 1136    URINALYSIS CULTURE, IF INDICATED [[id number]]     Order Status:  No result Specimen:  Urine     Blood Culture [[id number]]     Order Status:  No result Specimen:  Blood from Peripheral     Blood Culture [[id number]]     Order Status:  No result Specimen:  Blood from Peripheral           Assessment:  Active Hospital Problems    Diagnosis   • *Post-operative wound abscess [T81.49XA]   • Type 1 diabetes mellitus with neurological manifestations, uncontrolled (HCC) [E10.49, E10.65]   • Hypothyroidism, postsurgical [E89.0]   • Tobacco abuse [Z72.0]     Interval 24 hour assessment:      AF, O2 RA  Labs reviewed, Cr 3.91 this am    Studies reviewed  Micro reviewed    Pt with MSSA on cultures.  Will stop cefepime and continue linezolid.  She has some ongoing nausea but states it is improving and some unexplained right upper quadrant pain.  No significant back pain or drainage.    Assessment:     Anu Manuel is a 64 y.o. female with history of insulin-dependent diabetes mellitus, hypertension.  She initially had permanent spinal cord stimulator paddle placement on 10/26/2018.  She recently started having electric shocking sensation just superior to the generator battery.  She was taken to the OR on 12/16/2019 and underwent replacement of the spinal cord stimulator. She  presented to the ER on 1/18/2020 with complaints of increasing low back pain. Infection had been noted when she got her staples removed and she had been given Keflex. Despite 10 days of p.o. Keflex she had worsening pain.  In the ER she had a WBC count of 14,000,  temp of 100.1.  She was initially started on vancomycin and Zosyn.  The Zosyn was transitioned to cefepime on 1/19.   Vancomycin transition to linezolid on 1/20.         Spinal stimulator infection, worsened despite Keflex  -Stimulator replaced on 12/16/2019  -OR on 1/19, per op report thoracic incision with material under pressure, expressed and sent for cultures.  Infection.  Be suprafascial and did not obviously extend below the fascia per note.  Right flank incision also opened and purulent material expressed.  The leads and generator were removed. OR cx + MSSA   -Blood cultures from 1/18 no growth to date     Leukocytosis, improved     RHEA, new-worse today, possibly secondary to neurotoxicity related to vancomycin plus Zosyn- vanco stopped on 1/20      Diabetes, uncontrolled  -A1C 10.8     Nausea -Improving slowly     Tobacco abuse     Plan:   --- Stop cefepime and continue linezolid -we will plan on 2-week course from OR (end 2/3/20)   --- Follow-up in ID clinic in 2 weeks and if doing well can likely stop antibiotics.  If any concerns could transition to another oral for 2-4 more weeks as mop up.  Per op note deep structures did not appear to be involved and all hardware has been removed.   --- As this is a staph aureus - recommend MRI L-spine in 4-6 weeks to ensure clearance of infection   --- ESR and CRP today to establish baseline   ---- Monitor CBC while on Linezolid as can cause bone marrow suppression, but generally not until after several weeks      --- Okay for discharge from ID perspective once cleared by neurosurgery and will defer to hospitalist regarding renal dysfunction.  Linezolid not thought to be a cause of ongoing RHEA and does not  require renal dosing.     Discussed with internal medicine, Dr. Moyer.  ID will sign off.  Please call if any new questions or concerns.

## 2020-01-21 NOTE — PROGRESS NOTES
Pt is awake and alert. Reports of tolerable pain, refusing interventions at this time. She does report that she is getting intermittent pain on the Right side under her last rib. Does not complain of nausea/vomiting this AM. Last bowel movement was 5 days ago, she states that she did not eat much the first few days of admission. Educated pt that bowel protocol will need to be advanced due to narcotic use and decreased mobility. POC was addressed and all needs were met at this time.

## 2020-01-21 NOTE — CARE PLAN
Problem: Safety  Goal: Will remain free from injury  Outcome: PROGRESSING AS EXPECTED     Problem: Infection  Goal: Will remain free from infection  Outcome: PROGRESSING AS EXPECTED     Problem: Pain Management  Goal: Pain level will decrease to patient's comfort goal  Outcome: PROGRESSING AS EXPECTED     Problem: Psychosocial Needs:  Goal: Level of anxiety will decrease  Outcome: PROGRESSING AS EXPECTED     Problem: Mobility  Goal: Risk for activity intolerance will decrease  Outcome: PROGRESSING AS EXPECTED

## 2020-01-21 NOTE — PROGRESS NOTES
Neurosurgery Progress Note    Subjective:  No acute events. Tenderness at ALBINO drain site of Right flank. Denies fever/chills. Pain controlled    Exam:  A&O x3. Fluent Speech.   4 PERRL, EOMI  Strength: Bilateral bicep, tricep, deltoid,  5/5.                   Bilateral IP, DF, PF 5/5.  Sensation: Intact throughout.   Incisions: dressings c/d/i.      Right flank with minimal redness around incision, no drainage, tenderness with palpation     Thoracic incision no redness, drainage, tenderness with palpation  ALBINO x2: 0 output since placement  Eating, drinking. Denies n/v.  Voiding. +BM, 2020.  Pain controlled.       BP  Min: 126/69  Max: 151/70  Pulse  Av  Min: 85  Max: 97  Resp  Av.5  Min: 16  Max: 18  Temp  Av.6 °C (97.9 °F)  Min: 36.2 °C (97.2 °F)  Max: 37 °C (98.6 °F)  SpO2  Av.5 %  Min: 94 %  Max: 98 %    No data recorded    Recent Labs     20  0505 20  0229 20  0230   WBC 14.8* 11.4* 9.6   RBC 3.75* 3.54* 3.57*   HEMOGLOBIN 11.4* 11.2* 11.0*   HEMATOCRIT 36.1* 34.3* 34.6*   MCV 96.3 96.9 96.9   MCH 30.4 31.6 30.8   MCHC 31.6* 32.7* 31.8*   RDW 44.1 44.9 43.9   PLATELETCT 225 237 254   MPV 9.3 10.3 9.8     Recent Labs     20  0505 20  0229 20  0230   SODIUM 137 137 141   POTASSIUM 4.6 4.1 4.4   CHLORIDE 104 106 107   CO2 27 21 24   GLUCOSE 137* 181* 121*   BUN 12 20 26*   CREATININE 0.99 2.14* 3.91*   CALCIUM 8.4* 8.1* 8.1*     Recent Labs     20  1646   APTT 30.2   INR 1.06           Intake/Output       20 0700 - 20 0659 20 0700 - 20 0659       Total  Total       Intake    P.O.  240  -- 240  --  -- --    P.O. 240 -- 240 -- -- --    Total Intake 240 -- 240 -- -- --       Output    Drains  0  0 0  --  -- --    Output (mL) (Closed/Suction Drain 1 Posterior Back Kermit Wang 10 Fr.) 0 0 0 -- -- --    Output (mL) (Closed/Suction Drain 2 Posterior Back Kermit Wang 10 Fr.) 0 0 0 -- -- --     Stool  --  -- --  --  -- --    Number of Times Stooled -- 1 x 1 x -- -- --    Total Output 0 0 0 -- -- --       Net I/O     240 0 240 -- -- --            Intake/Output Summary (Last 24 hours) at 1/21/2020 0943  Last data filed at 1/21/2020 0425  Gross per 24 hour   Intake 240 ml   Output 0 ml   Net 240 ml            • cefepime  2 g Q24HRS   • linezolid  600 mg Q12HRS   • oxyCODONE-acetaminophen  1-2 Tab Q4HRS PRN   • morphine injection  2 mg Q HOUR PRN   • insulin regular  2-9 Units 4X/DAY ACHS    And   • glucose  16 g Q15 MIN PRN    And   • dextrose 10% bolus  250 mL Q15 MIN PRN   • ondansetron  4 mg Q4HRS PRN   • ondansetron  4 mg Q4HRS PRN   • prochlorperazine  5-10 mg Q4HRS PRN   • promethazine  12.5-25 mg Q4HRS PRN   • promethazine  12.5-25 mg Q4HRS PRN   • senna-docusate  2 Tab BID    And   • polyethylene glycol/lytes  1 Packet QDAY PRN    And   • magnesium hydroxide  30 mL QDAY PRN    And   • bisacodyl  10 mg QDAY PRN   • Respiratory Care per Protocol   Continuous RT   • atorvastatin  10 mg Nightly   • insulin glargine  24 Units QHS   • levothyroxine  175 mcg AM ES       Assessment and Plan:  POD #2 Removal SCS  Prophylactic anticoagulation: no         Start date/time: tbd    Microbiology: final  Thoracic: moderate gram + cocci  Right flank: negative      Plan:  Stable neuro   ALBINO drains remain with 0 output since placement   - Discontinue drains today  ID for anbx management, Assistance is appreciated.   Can shower 24 hours after drains removed.   Keep silver dressings to thoracic and right flank incisions. Redress prn.

## 2020-01-22 PROBLEM — E16.2 HYPOGLYCEMIA: Status: ACTIVE | Noted: 2020-01-22

## 2020-01-22 LAB
ANION GAP SERPL CALC-SCNC: 12 MMOL/L (ref 0–11.9)
BACTERIA TISS AEROBE CULT: ABNORMAL
BUN SERPL-MCNC: 31 MG/DL (ref 8–22)
CALCIUM SERPL-MCNC: 8.2 MG/DL (ref 8.5–10.5)
CHLORIDE SERPL-SCNC: 108 MMOL/L (ref 96–112)
CK SERPL-CCNC: 59 U/L (ref 0–154)
CO2 SERPL-SCNC: 23 MMOL/L (ref 20–33)
CREAT SERPL-MCNC: 5.3 MG/DL (ref 0.5–1.4)
GLUCOSE BLD-MCNC: 117 MG/DL (ref 65–99)
GLUCOSE BLD-MCNC: 129 MG/DL (ref 65–99)
GLUCOSE BLD-MCNC: 149 MG/DL (ref 65–99)
GLUCOSE SERPL-MCNC: 50 MG/DL (ref 65–99)
GRAM STN SPEC: ABNORMAL
GRAM STN SPEC: ABNORMAL
POTASSIUM SERPL-SCNC: 4.1 MMOL/L (ref 3.6–5.5)
SIGNIFICANT IND 70042: ABNORMAL
SIGNIFICANT IND 70042: ABNORMAL
SITE SITE: ABNORMAL
SITE SITE: ABNORMAL
SODIUM SERPL-SCNC: 143 MMOL/L (ref 135–145)
SOURCE SOURCE: ABNORMAL
SOURCE SOURCE: ABNORMAL
URATE SERPL-MCNC: 5.9 MG/DL (ref 1.9–8.2)

## 2020-01-22 PROCEDURE — 700102 HCHG RX REV CODE 250 W/ 637 OVERRIDE(OP): Performed by: HOSPITALIST

## 2020-01-22 PROCEDURE — 84550 ASSAY OF BLOOD/URIC ACID: CPT

## 2020-01-22 PROCEDURE — 700111 HCHG RX REV CODE 636 W/ 250 OVERRIDE (IP): Performed by: HOSPITALIST

## 2020-01-22 PROCEDURE — 99233 SBSQ HOSP IP/OBS HIGH 50: CPT | Performed by: INTERNAL MEDICINE

## 2020-01-22 PROCEDURE — 82550 ASSAY OF CK (CPK): CPT

## 2020-01-22 PROCEDURE — A9270 NON-COVERED ITEM OR SERVICE: HCPCS | Performed by: HOSPITALIST

## 2020-01-22 PROCEDURE — A9270 NON-COVERED ITEM OR SERVICE: HCPCS | Performed by: NEUROLOGICAL SURGERY

## 2020-01-22 PROCEDURE — 700102 HCHG RX REV CODE 250 W/ 637 OVERRIDE(OP): Performed by: NEUROLOGICAL SURGERY

## 2020-01-22 PROCEDURE — 82962 GLUCOSE BLOOD TEST: CPT

## 2020-01-22 PROCEDURE — 770001 HCHG ROOM/CARE - MED/SURG/GYN PRIV*

## 2020-01-22 PROCEDURE — 700102 HCHG RX REV CODE 250 W/ 637 OVERRIDE(OP): Performed by: INTERNAL MEDICINE

## 2020-01-22 PROCEDURE — A9270 NON-COVERED ITEM OR SERVICE: HCPCS | Performed by: INTERNAL MEDICINE

## 2020-01-22 PROCEDURE — 51798 US URINE CAPACITY MEASURE: CPT

## 2020-01-22 PROCEDURE — 80048 BASIC METABOLIC PNL TOTAL CA: CPT

## 2020-01-22 PROCEDURE — 36415 COLL VENOUS BLD VENIPUNCTURE: CPT

## 2020-01-22 RX ORDER — SODIUM CHLORIDE 9 MG/ML
INJECTION, SOLUTION INTRAVENOUS
Status: DISCONTINUED
Start: 2020-01-22 | End: 2020-01-22 | Stop reason: CLARIF

## 2020-01-22 RX ORDER — INSULIN GLARGINE 100 [IU]/ML
15 INJECTION, SOLUTION SUBCUTANEOUS
Status: DISCONTINUED | OUTPATIENT
Start: 2020-01-22 | End: 2020-01-24

## 2020-01-22 RX ORDER — HYDROMORPHONE HYDROCHLORIDE 1 MG/ML
0.5 INJECTION, SOLUTION INTRAMUSCULAR; INTRAVENOUS; SUBCUTANEOUS
Status: DISCONTINUED | OUTPATIENT
Start: 2020-01-22 | End: 2020-01-30 | Stop reason: HOSPADM

## 2020-01-22 RX ORDER — SODIUM CHLORIDE 9 MG/ML
INJECTION, SOLUTION INTRAVENOUS CONTINUOUS
Status: DISCONTINUED | OUTPATIENT
Start: 2020-01-22 | End: 2020-01-22

## 2020-01-22 RX ADMIN — LINEZOLID 600 MG: 600 TABLET, FILM COATED ORAL at 04:58

## 2020-01-22 RX ADMIN — PROMETHAZINE HYDROCHLORIDE 25 MG: 25 TABLET ORAL at 20:19

## 2020-01-22 RX ADMIN — LEVOTHYROXINE SODIUM 175 MCG: 150 TABLET ORAL at 04:58

## 2020-01-22 RX ADMIN — ONDANSETRON 4 MG: 4 TABLET, ORALLY DISINTEGRATING ORAL at 16:43

## 2020-01-22 RX ADMIN — ATORVASTATIN CALCIUM 10 MG: 10 TABLET, FILM COATED ORAL at 20:18

## 2020-01-22 RX ADMIN — ONDANSETRON 4 MG: 2 INJECTION INTRAMUSCULAR; INTRAVENOUS at 08:53

## 2020-01-22 RX ADMIN — NICOTINE POLACRILEX 2 MG: 2 GUM, CHEWING BUCCAL at 12:48

## 2020-01-22 RX ADMIN — MAGNESIUM CITRATE 296 ML: 1.75 LIQUID ORAL at 12:58

## 2020-01-22 RX ADMIN — INSULIN GLARGINE 15 UNITS: 100 INJECTION, SOLUTION SUBCUTANEOUS at 20:22

## 2020-01-22 RX ADMIN — SENNOSIDES AND DOCUSATE SODIUM 2 TABLET: 8.6; 5 TABLET ORAL at 04:58

## 2020-01-22 RX ADMIN — MAGNESIUM HYDROXIDE 30 ML: 400 SUSPENSION ORAL at 04:57

## 2020-01-22 RX ADMIN — POLYETHYLENE GLYCOL 3350 1 PACKET: 17 POWDER, FOR SOLUTION ORAL at 04:57

## 2020-01-22 RX ADMIN — SENNOSIDES AND DOCUSATE SODIUM 2 TABLET: 8.6; 5 TABLET ORAL at 16:26

## 2020-01-22 RX ADMIN — LINEZOLID 600 MG: 600 TABLET, FILM COATED ORAL at 16:26

## 2020-01-22 ASSESSMENT — ENCOUNTER SYMPTOMS
CHILLS: 0
SPEECH CHANGE: 0
PALPITATIONS: 0
BACK PAIN: 1
NAUSEA: 0
ORTHOPNEA: 0
FEVER: 0
SHORTNESS OF BREATH: 0
VOMITING: 0
ABDOMINAL PAIN: 0
BRUISES/BLEEDS EASILY: 0
NERVOUS/ANXIOUS: 1
EYE DISCHARGE: 0
FOCAL WEAKNESS: 0
NECK PAIN: 0
DIZZINESS: 0
NAUSEA: 1
POLYDIPSIA: 0
CHILLS: 1
HEADACHES: 0
SENSORY CHANGE: 0
SPUTUM PRODUCTION: 0
EYE PAIN: 0
MYALGIAS: 0
COUGH: 0

## 2020-01-22 ASSESSMENT — LIFESTYLE VARIABLES: SUBSTANCE_ABUSE: 0

## 2020-01-22 NOTE — CONSULTS
DATE OF SERVICE:  01/21/2020    REQUESTING PHYSICIAN:  Nilesh Moyer MD    REASON FOR CONSULTATION:  Acute kidney injury.    The patient seen and examined, medical record reviewed.    HISTORY OF PRESENT ILLNESS:  The patient is an unfortunate 64-year-old lady   with a past medical history significant for longstanding diabetes, history of   hypertension, history of chronic pain status post permanent spinal cord   stimulator placement in 10/2018 with readjustment in 12/2019.  The patient   presented to the hospital on 01/19 with spinal stimulator site infection, the   patient underwent surgical removal of the spinal cord stimulator, and her   hospitalization has been complicated by acute kidney injury with a creatinine   on admission of 0.79 and today is up to 3.91.  The patient has no recent IV   contrast exposure or NSAID use.    The patient has no hematuria, no dysuria.  She has decreased appetite and   decreased urine output.    PAST MEDICAL HISTORY:  Significant for:  1.  Chronic pain.  2.  Diabetes mellitus.  3.  Hypertension.    ALLERGIES:  The list was reviewed.    MEDICATIONS:  Reviewed.    FAMILY HISTORY:  Positive for hypertension.    REVIEW OF SYSTEMS:  The patient is anxious.  She is weak, but no hematuria, no   dysuria, no shortness of breath, no chest pain.  All other review of system   is negative except outlined in the history of present illness.    PHYSICAL EXAMINATION:  GENERAL:  The patient is in no apparent distress.  VITAL SIGNS:  Showed blood pressure of 132/73, heart rate was 85, respiratory   rate was 16.  HEENT:  Normocephalic, atraumatic.  Sclerae anicteric.  Pupils are reactive.    Nose is normal.  Mucous membranes moist.  NECK:  No lymphadenopathy, no JVD, no thyroid mass.  CHEST:  Normal.  LUNGS:  Clear to auscultation.  HEART:  S1, S2.  ABDOMEN:  Soft, nontender, no hepatosplenomegaly.  There is no inguinal   lymphadenopathy.  EXTREMITIES:  There is no lower extremity edema.  SKIN:  No  skin rashes.  NEUROLOGIC:  The patient is alert and oriented x3.    LABORATORY DATA:  Her recent labs were reviewed.    DIAGNOSTIC DATA:  I did review the renal ultrasound image myself, which showed   no hydronephrosis.  However, the bladder is not visualized.    ASSESSMENT AND PLAN:  1.  Acute kidney injury.  The etiology is most likely acute tubular necrosis   secondary to sepsis; however, I would like to check postvoid bladder scan to   rule out urinary retention considering her recent spinal cord surgery.  2.  Anemia.  3.  Sepsis.  4.  Chronic pain.  5.  Diabetes mellitus.    PLAN:  1.  There is no acute need for renal replacement therapy.  2.  Check urine sodium, creatinine, as well as protein.  3.  Check postvoid bladder scan to rule out postvoid residual.  4.  Daily labs.  5.  Encourage p.o. intake.  6.  Evaluate daily the need for dialysis if kidney function do not improve.  7.  Prognosis is guarded.    Plan discussed in detail with Dr. Moyer.       ____________________________________     FADI NAJJAR, MD FN / ALVARO    DD:  01/21/2020 14:56:19  DT:  01/21/2020 16:38:28    D#:  [id number]  Job#:  [id number]

## 2020-01-22 NOTE — PROGRESS NOTES
Hospital Medicine Daily Progress Note    Date of Service  1/22/2020    Chief Complaint  64 y.o. female admitted 1/18/2020 with wound infection, back pain     Hospital Course    This is a 65 y/o F with a past medical history of Insulin dependent diabetes mellitus, hypertension, recently underwent a replacement of her spinal cord stimulator on 12/16/2019. She had developed an nearby infection around the stimulator for which she was given outpatient antibiotics including Keflex, however despite taking these antibiotics patient now complains of worsening pain around that area. Pt admitted for further treatment. Neurosurgery has been consulted appreciate rec.         Interval Problem Update  Patient is in no distress.  Requesting to be discharged.  I discussed with her worsening of kidney function today and needs to continue monitoring in the hospital.  Bladder scan was checked and patient does not appear to be retaining urine.  We will start on IV normal saline  With low blood sugar at 50 noted in the morning.  Lantus dose decreased from 25 to 15 units every bedtime  Patient denies any pain, independent with ambulation    Consultants/Specialty  Neurosurgery   Infection disease     Code Status  Full Code     Disposition  Home when medically clear    Review of Systems  Review of Systems   Constitutional: Positive for chills. Negative for fever.   HENT: Negative for ear pain and tinnitus.    Eyes: Negative for pain and discharge.   Respiratory: Negative for cough, sputum production and shortness of breath.    Cardiovascular: Negative for chest pain, palpitations and orthopnea.   Gastrointestinal: Negative for abdominal pain, nausea and vomiting.   Genitourinary: Negative for dysuria and urgency.   Musculoskeletal: Positive for back pain. Negative for joint pain, myalgias and neck pain.   Neurological: Negative for dizziness, sensory change, speech change, focal weakness and headaches.   Endo/Heme/Allergies: Negative for  environmental allergies and polydipsia. Does not bruise/bleed easily.   Psychiatric/Behavioral: Negative for substance abuse and suicidal ideas.   All other systems reviewed and are negative.       Physical Exam  Temp:  [36.2 °C (97.2 °F)-36.6 °C (97.8 °F)] 36.2 °C (97.2 °F)  Pulse:  [80-87] 83  Resp:  [16-18] 16  BP: (114-146)/(67-82) 114/67  SpO2:  [93 %-98 %] 98 %    Physical Exam  Vitals signs and nursing note reviewed.   HENT:      Head: Normocephalic and atraumatic.   Eyes:      General: No scleral icterus.        Right eye: No discharge.         Left eye: No discharge.      Conjunctiva/sclera: Conjunctivae normal.   Cardiovascular:      Rate and Rhythm: Normal rate.      Heart sounds: No murmur. No gallop.    Pulmonary:      Effort: Pulmonary effort is normal.      Breath sounds: Normal breath sounds. No stridor.   Abdominal:      General: Bowel sounds are normal. There is no distension.      Tenderness: There is no tenderness.   Skin:     General: Skin is warm.      Coloration: Skin is not jaundiced.      Findings: Erythema (at the surgical incision ) present.   Neurological:      Mental Status: She is alert and oriented to person, place, and time. Mental status is at baseline.   Psychiatric:         Mood and Affect: Mood normal.     I examined the patient on 1/22.  No significant changes on physical exam from 1/21 noted except as documented above.    Fluids    Intake/Output Summary (Last 24 hours) at 1/22/2020 1242  Last data filed at 1/21/2020 2000  Gross per 24 hour   Intake 360 ml   Output --   Net 360 ml       Laboratory  Recent Labs     01/20/20 0229 01/21/20  0230   WBC 11.4* 9.6   RBC 3.54* 3.57*   HEMOGLOBIN 11.2* 11.0*   HEMATOCRIT 34.3* 34.6*   MCV 96.9 96.9   MCH 31.6 30.8   MCHC 32.7* 31.8*   RDW 44.9 43.9   PLATELETCT 237 254   MPV 10.3 9.8     Recent Labs     01/20/20 0229 01/21/20  0230 01/22/20  0250   SODIUM 137 141 143   POTASSIUM 4.1 4.4 4.1   CHLORIDE 106 107 108   CO2 21 24 23    GLUCOSE 181* 121* 50*   BUN 20 26* 31*   CREATININE 2.14* 3.91* 5.30*   CALCIUM 8.1* 8.1* 8.2*     Recent Labs     01/19/20  1646   APTT 30.2   INR 1.06               Imaging  US-RENAL   Final Result      1.  Unremarkable kidneys.   2.  No hydronephrosis.   3.  Limited evaluation of bladder.           Assessment/Plan  * Post-operative wound abscess- (present on admission)  Assessment & Plan  Involving her spinal stimulator area.  Received course of IV  Vancomycin and Zosyn   Neurosurgery following had surgery and removal of the spine stimulator yesterday  ID also following  Appreciate rec.   On linezolid until 2/3/20 per ID    Hypoglycemia  Assessment & Plan  1/22 blood sugar 50 early in the morning.  Will reduce dose of Lantus from 25 to 15 units at night  Hypoglycemia product    Type 1 diabetes mellitus with neurological manifestations, uncontrolled (HCC)- (present on admission)  Assessment & Plan  Uncontrolled  Last a1c was 10.7  Cont Degludec.  Dose decreased to 15 units from 25 on 1/22 due to hypoglycemia  Continue Insulin-sliding scale, accu-checks and hypoglycemia protocol.  Monitor     Hypothyroidism, postsurgical- (present on admission)  Assessment & Plan  Resume home dose of synthroid    RHEA (acute kidney injury) (HCC)  Assessment & Plan  Kidney function is getting worse  Etiology unclear  Most likely ATN per nephrology  Bladder scan did not reveal retention.  Kidney ultrasound unremarkable; no hydronephrosis.  Ordered UA to look for proteinuria and sediment; check CPK and uric acid  We will follow with nephrology recommendations.  Avoid nephrotoxins  IV normal saline started 1/22    Tobacco abuse- (present on admission)  Assessment & Plan  Counseled on cessation        VTE prophylaxis: scd

## 2020-01-22 NOTE — PROGRESS NOTES
Nephrology Daily Progress Note    Date of Service  1/22/2020    Chief Complaint  64 y.o. female admitted 1/18/2020 with infected spinal cord stimulator, status post surgical removal.  Her hospitalization has been complicated by acute kidney injury    Interval Problem Update  Patient is doing okay today, had mild nausea earlier this morning, no chest pain no shortness of breath.    Review of Systems  Review of Systems   Constitutional: Negative for chills, fever and malaise/fatigue.   Respiratory: Negative for cough and shortness of breath.    Cardiovascular: Negative for chest pain and leg swelling.   Gastrointestinal: Positive for nausea. Negative for vomiting.   Genitourinary: Negative for dysuria, frequency and urgency.   Psychiatric/Behavioral: The patient is nervous/anxious.    All other systems reviewed and are negative.       Physical Exam  Temp:  [36.2 °C (97.2 °F)-36.6 °C (97.8 °F)] 36.2 °C (97.2 °F)  Pulse:  [80-87] 83  Resp:  [16-18] 16  BP: (114-146)/(67-82) 114/67  SpO2:  [93 %-98 %] 98 %    Physical Exam  Vitals signs and nursing note reviewed.   Constitutional:       General: She is not in acute distress.     Appearance: Normal appearance. She is well-developed. She is not ill-appearing or diaphoretic.   HENT:      Head: Normocephalic and atraumatic.      Right Ear: External ear normal.      Left Ear: External ear normal.      Nose: Nose normal.      Mouth/Throat:      Mouth: Mucous membranes are moist.      Pharynx: No oropharyngeal exudate or posterior oropharyngeal erythema.   Eyes:      General: No scleral icterus.        Right eye: No discharge.         Left eye: No discharge.      Conjunctiva/sclera: Conjunctivae normal.   Neck:      Vascular: No carotid bruit.   Cardiovascular:      Rate and Rhythm: Normal rate and regular rhythm.      Heart sounds: No murmur.   Pulmonary:      Effort: Pulmonary effort is normal. No respiratory distress.      Breath sounds: Normal breath sounds. No rales.    Abdominal:      General: There is no distension.      Palpations: There is no mass.      Hernia: No hernia is present.   Musculoskeletal:         General: No swelling or tenderness.      Right lower leg: No edema.      Left lower leg: No edema.   Lymphadenopathy:      Cervical: No cervical adenopathy.   Skin:     General: Skin is warm and dry.      Coloration: Skin is not jaundiced.      Findings: No erythema or lesion.   Neurological:      General: No focal deficit present.      Mental Status: She is alert and oriented to person, place, and time.      Cranial Nerves: No cranial nerve deficit.   Psychiatric:         Mood and Affect: Mood normal.         Behavior: Behavior normal.         Thought Content: Thought content normal.         Fluids    Intake/Output Summary (Last 24 hours) at 1/22/2020 1147  Last data filed at 1/21/2020 2000  Gross per 24 hour   Intake 360 ml   Output 0 ml   Net 360 ml       Laboratory  Recent Labs     01/20/20 0229 01/21/20  0230   WBC 11.4* 9.6   RBC 3.54* 3.57*   HEMOGLOBIN 11.2* 11.0*   HEMATOCRIT 34.3* 34.6*   MCV 96.9 96.9   MCH 31.6 30.8   MCHC 32.7* 31.8*   RDW 44.9 43.9   PLATELETCT 237 254   MPV 10.3 9.8     Recent Labs     01/20/20  0229 01/21/20  0230 01/22/20  0250   SODIUM 137 141 143   POTASSIUM 4.1 4.4 4.1   CHLORIDE 106 107 108   CO2 21 24 23   GLUCOSE 181* 121* 50*   BUN 20 26* 31*   CREATININE 2.14* 3.91* 5.30*   CALCIUM 8.1* 8.1* 8.2*     Recent Labs     01/19/20  1646   APTT 30.2   INR 1.06     No results for input(s): NTPROBNP in the last 72 hours.        Imaging  US-RENAL   Final Result      1.  Unremarkable kidneys.   2.  No hydronephrosis.   3.  Limited evaluation of bladder.            Assessment/Plan  1 RHEA: Secondary to acute tubular necrosis.  2 sepsis  3 anemia  4 hypoalbuminemia  Plan  no acute need for HD today, evaluate daily  Check bladder scan to rule out postvoid residual  Renal diet  Daily labs  Renal dose all meds  Avoid nephrotoxins  Prognosis  guarded.  D/W Dr Moyer

## 2020-01-22 NOTE — PROGRESS NOTES
Neurosurgery Progress Note    Subjective:  No acute events. Worried about her kidneys. Denies pain    Exam:  A&O x3. Fluent Speech.   4 PERRL, EOMI  Strength: Bilateral bicep, tricep, deltoid,  5/5.                   Bilateral IP, DF, PF 5/5.  Sensation: Intact throughout.   Incisions: dressings c/d/i.      Right flank with minimal redness around incision, no drainage, tenderness with palpation     Thoracic incision no redness, drainage, tenderness with palpation  Eating, drinking. Denies n/v.  Voiding. +BM, 2020.  Pain controlled.       BP  Min: 114/67  Max: 146/68  Pulse  Av.3  Min: 80  Max: 87  Resp  Av.7  Min: 16  Max: 18  Temp  Av.4 °C (97.5 °F)  Min: 36.2 °C (97.2 °F)  Max: 36.6 °C (97.8 °F)  SpO2  Av.7 %  Min: 93 %  Max: 98 %    No data recorded    Recent Labs     20  0230   WBC 11.4* 9.6   RBC 3.54* 3.57*   HEMOGLOBIN 11.2* 11.0*   HEMATOCRIT 34.3* 34.6*   MCV 96.9 96.9   MCH 31.6 30.8   MCHC 32.7* 31.8*   RDW 44.9 43.9   PLATELETCT 237 254   MPV 10.3 9.8     Recent Labs     20  0230 20  0250   SODIUM 137 141 143   POTASSIUM 4.1 4.4 4.1   CHLORIDE 106 107 108   CO2 21 24 23   GLUCOSE 181* 121* 50*   BUN 20 26* 31*   CREATININE 2.14* 3.91* 5.30*   CALCIUM 8.1* 8.1* 8.2*     Recent Labs     20  1646   APTT 30.2   INR 1.06           Intake/Output       20 - 20 0659 20 07 - 20 0659      [id number] Total [id number] [id number] Total       Intake    P.O.  --  360 360  --  -- --    P.O. -- 360 360 -- -- --    Total Intake -- 360 360 -- -- --       Output    Drains  0  -- 0  --  -- --    Output (mL) ([REMOVED] Closed/Suction Drain 1 Posterior Back Kermit Wang 10 Fr.) 0 -- 0 -- -- --    Output (mL) ([REMOVED] Closed/Suction Drain 2 Posterior Back Kermit Wang 10 Fr.) 0 -- 0 -- -- --    Stool  --  -- --  --  -- --    Number of Times Stooled -- -- -- 1 x -- 1 x    Total Output 0 -- 0 -- -- --        Net I/O     0 360 360 -- -- --            Intake/Output Summary (Last 24 hours) at 1/22/2020 1346  Last data filed at 1/21/2020 2000  Gross per 24 hour   Intake 360 ml   Output --   Net 360 ml       $ Bladder Scan Results (mL): 2(post void )    • insulin glargine  15 Units QHS   • HYDROmorphone  0.5 mg Q2HRS PRN   • NS      • nicotine polacrilex  2 mg Q2HRS PRN   • ALPRAZolam  0.25 mg BID PRN   • linezolid  600 mg Q12HRS   • oxyCODONE-acetaminophen  1-2 Tab Q4HRS PRN   • insulin regular  2-9 Units 4X/DAY ACHS    And   • glucose  16 g Q15 MIN PRN    And   • dextrose 10% bolus  250 mL Q15 MIN PRN   • ondansetron  4 mg Q4HRS PRN   • ondansetron  4 mg Q4HRS PRN   • prochlorperazine  5-10 mg Q4HRS PRN   • promethazine  12.5-25 mg Q4HRS PRN   • promethazine  12.5-25 mg Q4HRS PRN   • senna-docusate  2 Tab BID    And   • polyethylene glycol/lytes  1 Packet QDAY PRN    And   • magnesium hydroxide  30 mL QDAY PRN    And   • bisacodyl  10 mg QDAY PRN   • Respiratory Care per Protocol   Continuous RT   • atorvastatin  10 mg Nightly   • levothyroxine  175 mcg AM ES       Assessment and Plan:  POD #2 Removal SCS  Prophylactic anticoagulation: no         Start date/time: tbd    Microbiology: final  Thoracic: moderate gram + cocci  Right flank: Staph. Aureus       Plan:  Stable neuro  ID following for antibiotics  Nephrology managing ATN   Can shower 24 hours after drains removed.   Keep silver dressings to thoracic and right flank incisions. Redress prn.  Mag. Citrate for bowel protocol.

## 2020-01-22 NOTE — PROGRESS NOTES
Hospital Medicine Daily Progress Note    Date of Service  1/21/2020    Chief Complaint  64 y.o. female admitted 1/18/2020 with wound infection, back pain     Hospital Course    This is a 65 y/o F with a past medical history of Insulin dependent diabetes mellitus, hypertension, recently underwent a replacement of her spinal cord stimulator on 12/16/2019. She had developed an nearby infection around the stimulator for which she was given outpatient antibiotics including Keflex, however despite taking these antibiotics patient now complains of worsening pain around that area. Pt admitted for further treatment. Neurosurgery has been consulted appreciate rec.         Interval Problem Update  No overnight events, requesting anxiolytics.  Notes that her pain is reasonably well controlled.  Neurosurgery and ID following appreciate rec.     Consultants/Specialty  Neurosurgery   Infection disease     Code Status  Full Code     Disposition  TBD    Review of Systems  Review of Systems   Constitutional: Positive for chills. Negative for fever.   HENT: Negative for ear pain and tinnitus.    Eyes: Negative for pain and discharge.   Respiratory: Negative for cough, sputum production and shortness of breath.    Cardiovascular: Negative for chest pain, palpitations and orthopnea.   Gastrointestinal: Negative for abdominal pain, nausea and vomiting.   Genitourinary: Negative for dysuria and urgency.   Musculoskeletal: Positive for back pain.   Neurological: Negative for dizziness and headaches.   All other systems reviewed and are negative.       Physical Exam  Temp:  [36.2 °C (97.2 °F)-37 °C (98.6 °F)] 36.3 °C (97.4 °F)  Pulse:  [80-85] 80  Resp:  [16-18] 18  BP: (126-136)/(60-82) 136/82  SpO2:  [93 %-98 %] 93 %    Physical Exam  Vitals signs and nursing note reviewed.   HENT:      Head: Normocephalic and atraumatic.   Eyes:      General: No scleral icterus.        Right eye: No discharge.         Left eye: No discharge.       Conjunctiva/sclera: Conjunctivae normal.   Cardiovascular:      Rate and Rhythm: Normal rate.      Heart sounds: No murmur. No gallop.    Pulmonary:      Effort: Pulmonary effort is normal.      Breath sounds: Normal breath sounds. No stridor.   Abdominal:      General: Bowel sounds are normal. There is no distension.      Tenderness: There is no tenderness.   Skin:     General: Skin is warm.      Coloration: Skin is not jaundiced.      Findings: Erythema (at the surgical incision ) present.   Neurological:      Mental Status: She is alert and oriented to person, place, and time. Mental status is at baseline.   Psychiatric:         Mood and Affect: Mood normal.         Fluids    Intake/Output Summary (Last 24 hours) at 1/21/2020 1717  Last data filed at 1/21/2020 1200  Gross per 24 hour   Intake --   Output 0 ml   Net 0 ml       Laboratory  Recent Labs     01/19/20  0505 01/20/20  0229 01/21/20  0230   WBC 14.8* 11.4* 9.6   RBC 3.75* 3.54* 3.57*   HEMOGLOBIN 11.4* 11.2* 11.0*   HEMATOCRIT 36.1* 34.3* 34.6*   MCV 96.3 96.9 96.9   MCH 30.4 31.6 30.8   MCHC 31.6* 32.7* 31.8*   RDW 44.1 44.9 43.9   PLATELETCT 225 237 254   MPV 9.3 10.3 9.8     Recent Labs     01/19/20  0505 01/20/20  0229 01/21/20  0230   SODIUM 137 137 141   POTASSIUM 4.6 4.1 4.4   CHLORIDE 104 106 107   CO2 27 21 24   GLUCOSE 137* 181* 121*   BUN 12 20 26*   CREATININE 0.99 2.14* 3.91*   CALCIUM 8.4* 8.1* 8.1*     Recent Labs     01/19/20  1646   APTT 30.2   INR 1.06               Imaging  US-RENAL   Final Result      1.  Unremarkable kidneys.   2.  No hydronephrosis.   3.  Limited evaluation of bladder.           Assessment/Plan  * Post-operative wound abscess- (present on admission)  Assessment & Plan  Involving her spinal stimulator area.  On IV  Vancomycin and Zosyn   Neurosurgery following had surgery and removal of the spine stimulator yesterday  ID also following  Appreciate rec.     Type 1 diabetes mellitus with neurological manifestations,  uncontrolled (HCC)- (present on admission)  Assessment & Plan  Uncontrolled  Last a1c was 10.7  Cont Degludec 24U qhs, we will titrate  Continue Insulin-sliding scale, accu-checks and hypoglycemia protocol.  Monitor     Hypothyroidism, postsurgical- (present on admission)  Assessment & Plan  Resume home dose of synthroid    RHEA (acute kidney injury) ([facility])  Assessment & Plan  Etiology unclear, sepsis versus vancomycin toxicities.  Renal consult pending.    Tobacco abuse- (present on admission)  Assessment & Plan  Counseled on cessation        VTE prophylaxis: scd

## 2020-01-22 NOTE — DISCHARGE PLANNING
Anticipated Discharge Disposition: Home    Action: Discussed in IDT rounds.  Anticipate pt will go home on oral antibiotics through 2/3/2020 per ID note.      Barriers to Discharge: Medical clearance pending.    Plan: Await medical clearance for discharge home. RN CM to follow and review need for prior auth/copay amount once medication is e-scribed to her preferred pharmacy.

## 2020-01-22 NOTE — ASSESSMENT & PLAN NOTE
Probably polyuric, but no urine output documented  Etiology unclear  Most likely ATN per nephrology  Bladder scan did not reveal retention.  Kidney ultrasound unremarkable; no hydronephrosis.  Ordered UA to look for proteinuria and sediment  We will follow with nephrology recommendations.  Avoid nephrotoxins  Uric acid and CPK not elevated.  Follow with UA, monitor input and output, monitor and correct electrolytes as needed  Linezolid is safe, no need for dose adjustment  1/25 creatinine 6.15, BUN 39.  Complaining of nausea vomiting.  Plan for hemodialysis today.  Temporary catheter will be placed  1/26 additional hemodialysis planned today  1/27 dialysis on pause.  Repeat BMP tomorrow and decide with the patient needs to go on outpatient hemodialysis    Nephrology following regarding her dialysis appreciate rec.

## 2020-01-22 NOTE — ASSESSMENT & PLAN NOTE
1/22 blood sugar 50 early in the morning.  Will reduce dose of Lantus from 25 to 15 units at night  Hypoglycemia product  1/24 reoccurs.  Secondary to renal failure and decreased renal insulin clearance.  Will reduce dose of Lantus to 5 units at night  1/25 will DC Lantus  1/27 reduce dose of Lantus from 8 to 5 units

## 2020-01-22 NOTE — PROGRESS NOTES
1710 Both ALBINO drains were removed. Upon removal, pt had moderate amount of drainage drain from site. This RN held pressure for 5-10 minutes before drainage tapered down.

## 2020-01-22 NOTE — PROGRESS NOTES
Assumed care from day shift.  Call light in reach, bed in lowest and locked position.  Pt states no needs at this time.

## 2020-01-22 NOTE — CARE PLAN
Problem: Communication  Goal: The ability to communicate needs accurately and effectively will improve  Outcome: PROGRESSING AS EXPECTED     Problem: Safety  Goal: Will remain free from injury  Outcome: PROGRESSING AS EXPECTED  Goal: Will remain free from falls  Outcome: PROGRESSING AS EXPECTED     Problem: Infection  Goal: Will remain free from infection  Outcome: PROGRESSING AS EXPECTED     Problem: Knowledge Deficit  Goal: Knowledge of disease process/condition, treatment plan, diagnostic tests, and medications will improve  Outcome: PROGRESSING AS EXPECTED  Goal: Knowledge of the prescribed therapeutic regimen will improve  Outcome: PROGRESSING AS EXPECTED     Problem: Pain Management  Goal: Pain level will decrease to patient's comfort goal  Outcome: PROGRESSING AS EXPECTED     Problem: Psychosocial Needs:  Goal: Level of anxiety will decrease  Outcome: PROGRESSING AS EXPECTED     Problem: Mobility  Goal: Risk for activity intolerance will decrease  Outcome: PROGRESSING AS EXPECTED

## 2020-01-23 LAB
ANION GAP SERPL CALC-SCNC: 12 MMOL/L (ref 0–11.9)
BACTERIA BLD CULT: NORMAL
BACTERIA BLD CULT: NORMAL
BACTERIA SPEC ANAEROBE CULT: NORMAL
BACTERIA SPEC ANAEROBE CULT: NORMAL
BASOPHILS # BLD AUTO: 1.2 % (ref 0–1.8)
BASOPHILS # BLD: 0.09 K/UL (ref 0–0.12)
BUN SERPL-MCNC: 34 MG/DL (ref 8–22)
CALCIUM SERPL-MCNC: 8 MG/DL (ref 8.5–10.5)
CHLORIDE SERPL-SCNC: 104 MMOL/L (ref 96–112)
CO2 SERPL-SCNC: 23 MMOL/L (ref 20–33)
CREAT SERPL-MCNC: 5.5 MG/DL (ref 0.5–1.4)
EOSINOPHIL # BLD AUTO: 0.42 K/UL (ref 0–0.51)
EOSINOPHIL NFR BLD: 5.5 % (ref 0–6.9)
ERYTHROCYTE [DISTWIDTH] IN BLOOD BY AUTOMATED COUNT: 44.1 FL (ref 35.9–50)
GLUCOSE BLD-MCNC: 119 MG/DL (ref 65–99)
GLUCOSE BLD-MCNC: 155 MG/DL (ref 65–99)
GLUCOSE BLD-MCNC: 272 MG/DL (ref 65–99)
GLUCOSE BLD-MCNC: 64 MG/DL (ref 65–99)
GLUCOSE BLD-MCNC: 66 MG/DL (ref 65–99)
GLUCOSE SERPL-MCNC: 192 MG/DL (ref 65–99)
HCT VFR BLD AUTO: 35.3 % (ref 37–47)
HGB BLD-MCNC: 11.1 G/DL (ref 12–16)
IMM GRANULOCYTES # BLD AUTO: 0.03 K/UL (ref 0–0.11)
IMM GRANULOCYTES NFR BLD AUTO: 0.4 % (ref 0–0.9)
LYMPHOCYTES # BLD AUTO: 1.13 K/UL (ref 1–4.8)
LYMPHOCYTES NFR BLD: 14.8 % (ref 22–41)
MAGNESIUM SERPL-MCNC: 2.6 MG/DL (ref 1.5–2.5)
MCH RBC QN AUTO: 30.2 PG (ref 27–33)
MCHC RBC AUTO-ENTMCNC: 31.4 G/DL (ref 33.6–35)
MCV RBC AUTO: 96.2 FL (ref 81.4–97.8)
MONOCYTES # BLD AUTO: 1 K/UL (ref 0–0.85)
MONOCYTES NFR BLD AUTO: 13.1 % (ref 0–13.4)
NEUTROPHILS # BLD AUTO: 4.97 K/UL (ref 2–7.15)
NEUTROPHILS NFR BLD: 65 % (ref 44–72)
NRBC # BLD AUTO: 0 K/UL
NRBC BLD-RTO: 0 /100 WBC
PLATELET # BLD AUTO: 279 K/UL (ref 164–446)
PMV BLD AUTO: 9.5 FL (ref 9–12.9)
POTASSIUM SERPL-SCNC: 4.1 MMOL/L (ref 3.6–5.5)
RBC # BLD AUTO: 3.67 M/UL (ref 4.2–5.4)
SIGNIFICANT IND 70042: NORMAL
SITE SITE: NORMAL
SODIUM SERPL-SCNC: 139 MMOL/L (ref 135–145)
SOURCE SOURCE: NORMAL
WBC # BLD AUTO: 7.6 K/UL (ref 4.8–10.8)

## 2020-01-23 PROCEDURE — A9270 NON-COVERED ITEM OR SERVICE: HCPCS | Performed by: NEUROLOGICAL SURGERY

## 2020-01-23 PROCEDURE — 700111 HCHG RX REV CODE 636 W/ 250 OVERRIDE (IP): Performed by: ANESTHESIOLOGY

## 2020-01-23 PROCEDURE — 99232 SBSQ HOSP IP/OBS MODERATE 35: CPT | Mod: 25 | Performed by: INTERNAL MEDICINE

## 2020-01-23 PROCEDURE — 99233 SBSQ HOSP IP/OBS HIGH 50: CPT | Performed by: INTERNAL MEDICINE

## 2020-01-23 PROCEDURE — 700102 HCHG RX REV CODE 250 W/ 637 OVERRIDE(OP): Performed by: INTERNAL MEDICINE

## 2020-01-23 PROCEDURE — 770001 HCHG ROOM/CARE - MED/SURG/GYN PRIV*

## 2020-01-23 PROCEDURE — 85025 COMPLETE CBC W/AUTO DIFF WBC: CPT

## 2020-01-23 PROCEDURE — 700105 HCHG RX REV CODE 258: Performed by: INTERNAL MEDICINE

## 2020-01-23 PROCEDURE — 80048 BASIC METABOLIC PNL TOTAL CA: CPT

## 2020-01-23 PROCEDURE — 700102 HCHG RX REV CODE 250 W/ 637 OVERRIDE(OP): Performed by: HOSPITALIST

## 2020-01-23 PROCEDURE — 700111 HCHG RX REV CODE 636 W/ 250 OVERRIDE (IP): Performed by: HOSPITALIST

## 2020-01-23 PROCEDURE — 82962 GLUCOSE BLOOD TEST: CPT | Mod: 91

## 2020-01-23 PROCEDURE — 700105 HCHG RX REV CODE 258: Performed by: ANESTHESIOLOGY

## 2020-01-23 PROCEDURE — A9270 NON-COVERED ITEM OR SERVICE: HCPCS | Performed by: INTERNAL MEDICINE

## 2020-01-23 PROCEDURE — A9270 NON-COVERED ITEM OR SERVICE: HCPCS | Performed by: HOSPITALIST

## 2020-01-23 PROCEDURE — 83735 ASSAY OF MAGNESIUM: CPT

## 2020-01-23 PROCEDURE — 36415 COLL VENOUS BLD VENIPUNCTURE: CPT

## 2020-01-23 PROCEDURE — 700102 HCHG RX REV CODE 250 W/ 637 OVERRIDE(OP): Performed by: NEUROLOGICAL SURGERY

## 2020-01-23 PROCEDURE — 99406 BEHAV CHNG SMOKING 3-10 MIN: CPT | Performed by: INTERNAL MEDICINE

## 2020-01-23 RX ORDER — HALOPERIDOL 5 MG/ML
1 INJECTION INTRAMUSCULAR
Status: DISCONTINUED | OUTPATIENT
Start: 2020-01-23 | End: 2020-01-25

## 2020-01-23 RX ORDER — SODIUM CHLORIDE 9 MG/ML
INJECTION, SOLUTION INTRAVENOUS CONTINUOUS
Status: DISCONTINUED | OUTPATIENT
Start: 2020-01-23 | End: 2020-01-23

## 2020-01-23 RX ORDER — NICOTINE 21 MG/24HR
21 PATCH, TRANSDERMAL 24 HOURS TRANSDERMAL
Status: DISCONTINUED | OUTPATIENT
Start: 2020-01-23 | End: 2020-01-30 | Stop reason: HOSPADM

## 2020-01-23 RX ORDER — LABETALOL HYDROCHLORIDE 5 MG/ML
5 INJECTION, SOLUTION INTRAVENOUS
Status: DISCONTINUED | OUTPATIENT
Start: 2020-01-23 | End: 2020-01-25 | Stop reason: HOSPADM

## 2020-01-23 RX ORDER — AMLODIPINE BESYLATE 2.5 MG/1
2.5 TABLET ORAL
Status: DISCONTINUED | OUTPATIENT
Start: 2020-01-23 | End: 2020-01-24

## 2020-01-23 RX ORDER — SODIUM CHLORIDE, SODIUM LACTATE, POTASSIUM CHLORIDE, CALCIUM CHLORIDE 600; 310; 30; 20 MG/100ML; MG/100ML; MG/100ML; MG/100ML
INJECTION, SOLUTION INTRAVENOUS CONTINUOUS
Status: DISCONTINUED | OUTPATIENT
Start: 2020-01-23 | End: 2020-01-24

## 2020-01-23 RX ORDER — OXYCODONE HYDROCHLORIDE AND ACETAMINOPHEN 5; 325 MG/1; MG/1
2 TABLET ORAL
Status: DISCONTINUED | OUTPATIENT
Start: 2020-01-23 | End: 2020-01-25 | Stop reason: HOSPADM

## 2020-01-23 RX ORDER — DIPHENHYDRAMINE HYDROCHLORIDE 50 MG/ML
12.5 INJECTION INTRAMUSCULAR; INTRAVENOUS
Status: DISCONTINUED | OUTPATIENT
Start: 2020-01-23 | End: 2020-01-25 | Stop reason: HOSPADM

## 2020-01-23 RX ORDER — OXYCODONE HYDROCHLORIDE AND ACETAMINOPHEN 5; 325 MG/1; MG/1
1 TABLET ORAL
Status: DISCONTINUED | OUTPATIENT
Start: 2020-01-23 | End: 2020-01-25 | Stop reason: HOSPADM

## 2020-01-23 RX ORDER — ONDANSETRON 2 MG/ML
4 INJECTION INTRAMUSCULAR; INTRAVENOUS
Status: COMPLETED | OUTPATIENT
Start: 2020-01-23 | End: 2020-01-23

## 2020-01-23 RX ORDER — MEPERIDINE HYDROCHLORIDE 25 MG/ML
25 INJECTION INTRAMUSCULAR; INTRAVENOUS; SUBCUTANEOUS
Status: DISCONTINUED | OUTPATIENT
Start: 2020-01-23 | End: 2020-01-25 | Stop reason: HOSPADM

## 2020-01-23 RX ADMIN — SODIUM CHLORIDE: 9 INJECTION, SOLUTION INTRAVENOUS at 11:11

## 2020-01-23 RX ADMIN — INSULIN HUMAN 2 UNITS: 100 INJECTION, SOLUTION PARENTERAL at 12:59

## 2020-01-23 RX ADMIN — LINEZOLID 600 MG: 600 TABLET, FILM COATED ORAL at 04:39

## 2020-01-23 RX ADMIN — LEVOTHYROXINE SODIUM 175 MCG: 150 TABLET ORAL at 04:38

## 2020-01-23 RX ADMIN — SODIUM CHLORIDE, POTASSIUM CHLORIDE, SODIUM LACTATE AND CALCIUM CHLORIDE: 600; 310; 30; 20 INJECTION, SOLUTION INTRAVENOUS at 21:18

## 2020-01-23 RX ADMIN — OXYCODONE HYDROCHLORIDE AND ACETAMINOPHEN 2 TABLET: 5; 325 TABLET ORAL at 10:10

## 2020-01-23 RX ADMIN — ONDANSETRON 4 MG: 2 INJECTION INTRAMUSCULAR; INTRAVENOUS at 13:03

## 2020-01-23 RX ADMIN — NICOTINE POLACRILEX 2 MG: 2 GUM, CHEWING BUCCAL at 10:12

## 2020-01-23 RX ADMIN — OXYCODONE HYDROCHLORIDE AND ACETAMINOPHEN 1 TABLET: 5; 325 TABLET ORAL at 18:46

## 2020-01-23 RX ADMIN — AMLODIPINE BESYLATE 2.5 MG: 2.5 TABLET ORAL at 14:36

## 2020-01-23 RX ADMIN — INSULIN HUMAN 5 UNITS: 100 INJECTION, SOLUTION PARENTERAL at 21:34

## 2020-01-23 RX ADMIN — ONDANSETRON 4 MG: 2 INJECTION INTRAMUSCULAR; INTRAVENOUS at 22:01

## 2020-01-23 RX ADMIN — SENNOSIDES AND DOCUSATE SODIUM 2 TABLET: 8.6; 5 TABLET ORAL at 18:00

## 2020-01-23 RX ADMIN — ALPRAZOLAM 0.25 MG: 0.25 TABLET ORAL at 08:59

## 2020-01-23 RX ADMIN — ATORVASTATIN CALCIUM 10 MG: 10 TABLET, FILM COATED ORAL at 21:28

## 2020-01-23 RX ADMIN — NICOTINE TRANSDERMAL SYSTEM 21 MG: 21 PATCH, EXTENDED RELEASE TRANSDERMAL at 11:04

## 2020-01-23 RX ADMIN — INSULIN GLARGINE 15 UNITS: 100 INJECTION, SOLUTION SUBCUTANEOUS at 21:37

## 2020-01-23 RX ADMIN — LINEZOLID 600 MG: 600 TABLET, FILM COATED ORAL at 18:00

## 2020-01-23 RX ADMIN — PROCHLORPERAZINE EDISYLATE 10 MG: 5 INJECTION INTRAMUSCULAR; INTRAVENOUS at 17:55

## 2020-01-23 ASSESSMENT — ENCOUNTER SYMPTOMS
CHILLS: 0
NECK PAIN: 0
POLYDIPSIA: 0
DIZZINESS: 0
CHILLS: 1
COUGH: 0
PALPITATIONS: 0
SENSORY CHANGE: 0
SPEECH CHANGE: 0
NAUSEA: 0
ABDOMINAL PAIN: 0
VOMITING: 0
FOCAL WEAKNESS: 0
MYALGIAS: 0
NERVOUS/ANXIOUS: 1
SHORTNESS OF BREATH: 0
EYE DISCHARGE: 0
SPUTUM PRODUCTION: 0
BRUISES/BLEEDS EASILY: 0
EYE PAIN: 0
BACK PAIN: 1
FEVER: 0
HEADACHES: 0
ORTHOPNEA: 0

## 2020-01-23 ASSESSMENT — LIFESTYLE VARIABLES: SUBSTANCE_ABUSE: 0

## 2020-01-23 NOTE — PROGRESS NOTES
"• Received report from Tosin   • Patient a & o x 4, high levels of intermittent anxiety able to redirect.   • Pt denies numbness/tingling  • SpO2 97% on RA  • Voiding to toilet, up self.  • BM on 1/22. Abd soft/nontender pt reports \"bloating\" feeling, Bowel sounds active, c/o nausea zofran administered per MAR.   • Diet Regular  • Pt states pain 0/10.  • no drain  • Surgical dressing CDI silver mepilex in place.   • Otherwise intact skin with scattered bruising  • SCDs Off, pt ambulatory up-self.   • Patient ambulated 250 ft with no assistance and FWW. Gait steady.   • Patient oriented to room, surroundings and call bell. Bed alarm off. Bed in lowest position, locked and upper side rails up. Patient demonstrated correct use of call bell. Call bell/belongings within reach. Patient educated on hourly rounding.   • Plan: monitor kidney function, push PO fluids.  Administered bowel protocol for constipation, and to hopefully relieve nausea.  Pt is very anxious to go home. Intermittently anxious and tearful. PIV infiltrated this afternoon. Pt refused for new IV to be placed. She stated \"maybe we can try tonight.\" will relay message to noc RN.   "

## 2020-01-23 NOTE — PROGRESS NOTES
Nephrology Daily Progress Note    Date of Service  1/23/2020    Chief Complaint  64 y.o. female admitted 1/18/2020 with infected spinal cord stimulator, status post surgical removal.  Her hospitalization has been complicated by acute kidney injury    Interval Problem Update  Patient is feeling little bit better.  Her oral intake has improved.  No nausea no vomiting.    Review of Systems  Review of Systems   Constitutional: Negative for chills, fever and malaise/fatigue.   Respiratory: Negative for cough and shortness of breath.    Cardiovascular: Negative for chest pain and leg swelling.   Gastrointestinal: Negative for nausea and vomiting.   Genitourinary: Negative for dysuria, frequency and urgency.   Psychiatric/Behavioral: The patient is nervous/anxious.    All other systems reviewed and are negative.       Physical Exam  Temp:  [36.3 °C (97.3 °F)-36.4 °C (97.6 °F)] 36.4 °C (97.5 °F)  Pulse:  [80-86] 82  Resp:  [16-18] 17  BP: (143-163)/(65-86) 161/75  SpO2:  [95 %-99 %] 95 %    Physical Exam  Vitals signs and nursing note reviewed.   Constitutional:       General: She is awake. She is not in acute distress.     Appearance: She is well-developed. She is ill-appearing. She is not diaphoretic.   HENT:      Head: Normocephalic and atraumatic.      Right Ear: External ear normal.      Left Ear: External ear normal.      Nose: Nose normal. No rhinorrhea.      Mouth/Throat:      Pharynx: No oropharyngeal exudate or posterior oropharyngeal erythema.   Eyes:      General: No scleral icterus.        Right eye: No discharge.         Left eye: No discharge.      Conjunctiva/sclera: Conjunctivae normal.   Neck:      Musculoskeletal: No neck rigidity or muscular tenderness.      Vascular: No carotid bruit.   Cardiovascular:      Rate and Rhythm: Normal rate and regular rhythm.      Heart sounds: No murmur.   Pulmonary:      Effort: Pulmonary effort is normal. No respiratory distress.      Breath sounds: Normal breath sounds.    Abdominal:      General: Abdomen is flat. There is no distension.      Palpations: Abdomen is soft. There is no mass.   Skin:     General: Skin is warm and dry.      Coloration: Skin is not jaundiced.   Neurological:      General: No focal deficit present.      Mental Status: She is alert and oriented to person, place, and time. Mental status is at baseline.   Psychiatric:         Mood and Affect: Mood normal.         Behavior: Behavior normal.         Fluids    Intake/Output Summary (Last 24 hours) at 1/23/2020 1456  Last data filed at 1/22/2020 2000  Gross per 24 hour   Intake 360 ml   Output --   Net 360 ml       Laboratory  Recent Labs     01/21/20  0230 01/23/20  0408   WBC 9.6 7.6   RBC 3.57* 3.67*   HEMOGLOBIN 11.0* 11.1*   HEMATOCRIT 34.6* 35.3*   MCV 96.9 96.2   MCH 30.8 30.2   MCHC 31.8* 31.4*   RDW 43.9 44.1   PLATELETCT 254 279   MPV 9.8 9.5     Recent Labs     01/21/20  0230 01/22/20  0250 01/23/20  0408   SODIUM 141 143 139   POTASSIUM 4.4 4.1 4.1   CHLORIDE 107 108 104   CO2 24 23 23   GLUCOSE 121* 50* 192*   BUN 26* 31* 34*   CREATININE 3.91* 5.30* 5.50*   CALCIUM 8.1* 8.2* 8.0*         No results for input(s): NTPROBNP in the last 72 hours.        Imaging  US-RENAL   Final Result      1.  Unremarkable kidneys.   2.  No hydronephrosis.   3.  Limited evaluation of bladder.            Assessment/Plan  1 RHEA: secondary to acute tubular necrosis, creatinine still elevated, nonoliguric, no uremic symptoms.  2 sepsis  3 anemia  4 hypoalbuminemia  5 uncontrolled hypertension   plan  no acute need for HD, continue to evaluate daily  Stop IV fluid because of high blood pressure and good p.o. intake  Renal diet  Daily labs  Renal dose all meds  Avoid nephrotoxins  Prognosis guarded.

## 2020-01-23 NOTE — PROGRESS NOTES
"Neurosurgery Progress Note    Subjective:  Sitting up in bed. Family at bedside. States wants to go home but understands her kidneys need to be \"fixed\"  Exam:  A&O x3. Fluent Speech.   4 PERRL, EOMI  Strength: Bilateral bicep, tricep, deltoid,  5/5.                   Bilateral IP, DF, PF 5/5.  Sensation: Intact throughout.   Incisions: dressings c/d/i.      Right flank with minimal redness around incision, scant drainage.  Thoracic incision no redness, drainage,  Eating, drinking. Denies n/v.  Voiding. +BM, 2020.  Pain controlled.       BP  Min: 143/73  Max: 163/86  Pulse  Av  Min: 80  Max: 86  Resp  Av.8  Min: 16  Max: 18  Temp  Av.3 °C (97.4 °F)  Min: 36.3 °C (97.3 °F)  Max: 36.4 °C (97.6 °F)  SpO2  Av.8 %  Min: 95 %  Max: 99 %    No data recorded    Recent Labs     20  0230 20  0408   WBC 9.6 7.6   RBC 3.57* 3.67*   HEMOGLOBIN 11.0* 11.1*   HEMATOCRIT 34.6* 35.3*   MCV 96.9 96.2   MCH 30.8 30.2   MCHC 31.8* 31.4*   RDW 43.9 44.1   PLATELETCT 254 279   MPV 9.8 9.5     Recent Labs     20  0230 20  0250 20  0408   SODIUM 141 143 139   POTASSIUM 4.4 4.1 4.1   CHLORIDE 107 108 104   CO2 24 23 23   GLUCOSE 121* 50* 192*   BUN 26* 31* 34*   CREATININE 3.91* 5.30* 5.50*   CALCIUM 8.1* 8.2* 8.0*               Intake/Output       20 - 2059 20 - 2059       Total  Total       Intake    P.O.  360  360 720  --  -- --    P.O. 360 360 720 -- -- --    Total Intake 360 360 720 -- -- --       Output    Stool  --  -- --  --  -- --    Number of Times Stooled 1 x -- 1 x -- -- --    Total Output -- -- -- -- -- --       Net I/O     360 360 720 -- -- --            Intake/Output Summary (Last 24 hours) at 2020 1436  Last data filed at 2020  Gross per 24 hour   Intake 360 ml   Output --   Net 360 ml            • nicotine  21 mg Daily-0600   • amLODIPine  2.5 mg Q DAY   • insulin glargine  " 15 Units QHS   • HYDROmorphone  0.5 mg Q2HRS PRN   • nicotine polacrilex  2 mg Q2HRS PRN   • ALPRAZolam  0.25 mg BID PRN   • linezolid  600 mg Q12HRS   • oxyCODONE-acetaminophen  1-2 Tab Q4HRS PRN   • insulin regular  2-9 Units 4X/DAY ACHS    And   • glucose  16 g Q15 MIN PRN    And   • dextrose 10% bolus  250 mL Q15 MIN PRN   • ondansetron  4 mg Q4HRS PRN   • ondansetron  4 mg Q4HRS PRN   • prochlorperazine  5-10 mg Q4HRS PRN   • promethazine  12.5-25 mg Q4HRS PRN   • promethazine  12.5-25 mg Q4HRS PRN   • senna-docusate  2 Tab BID    And   • polyethylene glycol/lytes  1 Packet QDAY PRN    And   • magnesium hydroxide  30 mL QDAY PRN    And   • bisacodyl  10 mg QDAY PRN   • Respiratory Care per Protocol   Continuous RT   • atorvastatin  10 mg Nightly   • levothyroxine  175 mcg AM ES       Assessment and Plan:  POD #2 Removal SCS  Prophylactic anticoagulation: no         Start date/time: tbd    Microbiology: final  Thoracic: moderate gram + cocci  Right flank: Staph. Aureus       Plan:  Stable neuro  ID following for antibiotics  Nephrology managing ATN   Keep silver dressings to thoracic and right flank incisions. Redress prn.  Maintain bowel protocol    Will follow on an intermittent basis. Call with any questions or concerns.

## 2020-01-23 NOTE — PROGRESS NOTES
Hospital Medicine Daily Progress Note    Date of Service  1/23/2020    Chief Complaint  64 y.o. female admitted 1/18/2020 with wound infection, back pain     Hospital Course    This is a 63 y/o F with a past medical history of Insulin dependent diabetes mellitus, hypertension, recently underwent a replacement of her spinal cord stimulator on 12/16/2019. She had developed an nearby infection around the stimulator for which she was given outpatient antibiotics including Keflex, however despite taking these antibiotics patient now complains of worsening pain around that area. Pt admitted for further treatment. Neurosurgery has been consulted appreciate rec.         Interval Problem Update  Patient in no distress.  Was complaining of some nausea, currently denies  Creatinine 5.50, BUN 34 today.  She states that she has significant urine output, but nursing is documented as urine output.  I discussed with the patient role of kidneys as a vital organ.  Ordered nicotine patch  No hypoglycemia noted  Consultants/Specialty  Neurosurgery   Infection disease     Code Status  Full Code     Disposition  Home when medically clear    Review of Systems  Review of Systems   Constitutional: Positive for chills. Negative for fever.   HENT: Negative for ear pain and tinnitus.    Eyes: Negative for pain and discharge.   Respiratory: Negative for cough, sputum production and shortness of breath.    Cardiovascular: Negative for chest pain, palpitations and orthopnea.   Gastrointestinal: Negative for abdominal pain, nausea and vomiting.   Genitourinary: Negative for dysuria and urgency.   Musculoskeletal: Positive for back pain. Negative for joint pain, myalgias and neck pain.   Neurological: Negative for dizziness, sensory change, speech change, focal weakness and headaches.   Endo/Heme/Allergies: Negative for environmental allergies and polydipsia. Does not bruise/bleed easily.   Psychiatric/Behavioral: Negative for substance abuse and  suicidal ideas.   All other systems reviewed and are negative.       Physical Exam  Temp:  [36.3 °C (97.3 °F)-36.4 °C (97.6 °F)] 36.4 °C (97.6 °F)  Pulse:  [80-86] 86  Resp:  [16-18] 16  BP: (143-163)/(65-86) 163/86  SpO2:  [96 %-99 %] 99 %    Physical Exam  Vitals signs and nursing note reviewed.   HENT:      Head: Normocephalic and atraumatic.   Eyes:      General: No scleral icterus.        Right eye: No discharge.         Left eye: No discharge.      Conjunctiva/sclera: Conjunctivae normal.   Cardiovascular:      Rate and Rhythm: Normal rate.      Heart sounds: No murmur. No gallop.    Pulmonary:      Effort: Pulmonary effort is normal.      Breath sounds: Normal breath sounds. No stridor.   Abdominal:      General: Bowel sounds are normal. There is no distension.      Tenderness: There is no tenderness.   Skin:     General: Skin is warm.      Coloration: Skin is not jaundiced.      Findings: Erythema (at the surgical incision ) present.   Neurological:      Mental Status: She is alert and oriented to person, place, and time. Mental status is at baseline.   Psychiatric:         Mood and Affect: Mood normal.     I examined the patient on 1/23.  No significant changes on physical exam from 1/22 noted except as documented above.    Fluids    Intake/Output Summary (Last 24 hours) at 1/23/2020 1050  Last data filed at 1/22/2020 2000  Gross per 24 hour   Intake 720 ml   Output --   Net 720 ml       Laboratory  Recent Labs     01/21/20  0230 01/23/20  0408   WBC 9.6 7.6   RBC 3.57* 3.67*   HEMOGLOBIN 11.0* 11.1*   HEMATOCRIT 34.6* 35.3*   MCV 96.9 96.2   MCH 30.8 30.2   MCHC 31.8* 31.4*   RDW 43.9 44.1   PLATELETCT 254 279   MPV 9.8 9.5     Recent Labs     01/21/20  0230 01/22/20  0250 01/23/20  0408   SODIUM 141 143 139   POTASSIUM 4.4 4.1 4.1   CHLORIDE 107 108 104   CO2 24 23 23   GLUCOSE 121* 50* 192*   BUN 26* 31* 34*   CREATININE 3.91* 5.30* 5.50*   CALCIUM 8.1* 8.2* 8.0*                   Imaging  US-RENAL    Final Result      1.  Unremarkable kidneys.   2.  No hydronephrosis.   3.  Limited evaluation of bladder.           Assessment/Plan  * Post-operative wound abscess- (present on admission)  Assessment & Plan  Involving her spinal stimulator area.  Received course of IV  Vancomycin and Zosyn   Neurosurgery following had surgery and removal of the spine stimulator yesterday  ID also following  Appreciate rec.   On linezolid until 2/3/20 per ID    Hypoglycemia  Assessment & Plan  1/22 blood sugar 50 early in the morning.  Will reduce dose of Lantus from 25 to 15 units at night  Hypoglycemia product    Resolved    Type 1 diabetes mellitus with neurological manifestations, uncontrolled (HCC)- (present on admission)  Assessment & Plan  Uncontrolled  Last a1c was 10.7  Cont Degludec.  Dose decreased to 15 units from 25 on 1/22 due to hypoglycemia  Continue Insulin-sliding scale, accu-checks and hypoglycemia protocol.  Monitor     Hypothyroidism, postsurgical- (present on admission)  Assessment & Plan  Resume home dose of synthroid    RHEA (acute kidney injury) ([facility])  Assessment & Plan  Probably polyuric, but no urine output documented  Etiology unclear  Most likely ATN per nephrology  Bladder scan did not reveal retention.  Kidney ultrasound unremarkable; no hydronephrosis.  Ordered UA to look for proteinuria and sediment  We will follow with nephrology recommendations.  Avoid nephrotoxins  Uric acid and CPK not elevated.  Follow with UA, monitor input and output, monitor and correct electrolytes as needed    Tobacco abuse- (present on admission)  Assessment & Plan  Spent approx 5 mins on Tobacco cessation education . Discussed options of nicotine patch, medical treatment with wellbutrin and chantix. Discussed the benefits of quitting smoking and risks of continued smoking including cardiovascular disease, cancer and COPD.   Code 26688         VTE prophylaxis: scd

## 2020-01-23 NOTE — CARE PLAN
Problem: Pain Management  Goal: Pain level will decrease to patient's comfort goal  Outcome: PROGRESSING AS EXPECTED  Note:   Taught pt 0-10 pain scale and  non pharmacological method of pain mgt, encouraged to verbalize when in pain. Administered PRN pain med as needed.      Problem: Psychosocial Needs:  Goal: Level of anxiety will decrease  Outcome: PROGRESSING AS EXPECTED  Note:   Allowed patient to talk about her/his feelings, educated on deep breathing techniques to cope with the anxiety. Encouraged family to visit with patient for emotional support. Anxiety PRN med given.

## 2020-01-24 PROBLEM — I10 HYPERTENSION: Status: ACTIVE | Noted: 2020-01-24

## 2020-01-24 LAB
ANION GAP SERPL CALC-SCNC: 9 MMOL/L (ref 0–11.9)
BUN SERPL-MCNC: 36 MG/DL (ref 8–22)
CALCIUM SERPL-MCNC: 8.5 MG/DL (ref 8.5–10.5)
CHLORIDE SERPL-SCNC: 105 MMOL/L (ref 96–112)
CO2 SERPL-SCNC: 25 MMOL/L (ref 20–33)
CREAT SERPL-MCNC: 6.43 MG/DL (ref 0.5–1.4)
GLUCOSE BLD-MCNC: 117 MG/DL (ref 65–99)
GLUCOSE BLD-MCNC: 126 MG/DL (ref 65–99)
GLUCOSE BLD-MCNC: 45 MG/DL (ref 65–99)
GLUCOSE BLD-MCNC: 77 MG/DL (ref 65–99)
GLUCOSE BLD-MCNC: 82 MG/DL (ref 65–99)
GLUCOSE SERPL-MCNC: 161 MG/DL (ref 65–99)
POTASSIUM SERPL-SCNC: 4.8 MMOL/L (ref 3.6–5.5)
SODIUM SERPL-SCNC: 139 MMOL/L (ref 135–145)

## 2020-01-24 PROCEDURE — A9270 NON-COVERED ITEM OR SERVICE: HCPCS | Performed by: INTERNAL MEDICINE

## 2020-01-24 PROCEDURE — 99233 SBSQ HOSP IP/OBS HIGH 50: CPT | Performed by: INTERNAL MEDICINE

## 2020-01-24 PROCEDURE — 700102 HCHG RX REV CODE 250 W/ 637 OVERRIDE(OP): Performed by: HOSPITALIST

## 2020-01-24 PROCEDURE — 700102 HCHG RX REV CODE 250 W/ 637 OVERRIDE(OP): Performed by: INTERNAL MEDICINE

## 2020-01-24 PROCEDURE — 36415 COLL VENOUS BLD VENIPUNCTURE: CPT

## 2020-01-24 PROCEDURE — 80048 BASIC METABOLIC PNL TOTAL CA: CPT

## 2020-01-24 PROCEDURE — A9270 NON-COVERED ITEM OR SERVICE: HCPCS | Performed by: HOSPITALIST

## 2020-01-24 PROCEDURE — 770001 HCHG ROOM/CARE - MED/SURG/GYN PRIV*

## 2020-01-24 PROCEDURE — 700111 HCHG RX REV CODE 636 W/ 250 OVERRIDE (IP): Performed by: HOSPITALIST

## 2020-01-24 PROCEDURE — 82962 GLUCOSE BLOOD TEST: CPT

## 2020-01-24 RX ORDER — AMLODIPINE BESYLATE 5 MG/1
5 TABLET ORAL
Status: DISCONTINUED | OUTPATIENT
Start: 2020-01-25 | End: 2020-01-30 | Stop reason: HOSPADM

## 2020-01-24 RX ORDER — AMLODIPINE BESYLATE 2.5 MG/1
2.5 TABLET ORAL ONCE
Status: COMPLETED | OUTPATIENT
Start: 2020-01-24 | End: 2020-01-24

## 2020-01-24 RX ORDER — INSULIN GLARGINE 100 [IU]/ML
5 INJECTION, SOLUTION SUBCUTANEOUS
Status: DISCONTINUED | OUTPATIENT
Start: 2020-01-24 | End: 2020-01-25

## 2020-01-24 RX ADMIN — ALPRAZOLAM 0.25 MG: 0.25 TABLET ORAL at 10:37

## 2020-01-24 RX ADMIN — Medication 16 G: at 05:54

## 2020-01-24 RX ADMIN — ATORVASTATIN CALCIUM 10 MG: 10 TABLET, FILM COATED ORAL at 21:14

## 2020-01-24 RX ADMIN — BENZOCAINE AND MENTHOL, UNSPECIFIED FORM 1 LOZENGE: 15; 3.6 LOZENGE ORAL at 11:38

## 2020-01-24 RX ADMIN — NICOTINE TRANSDERMAL SYSTEM 21 MG: 21 PATCH, EXTENDED RELEASE TRANSDERMAL at 05:39

## 2020-01-24 RX ADMIN — SENNOSIDES AND DOCUSATE SODIUM 2 TABLET: 8.6; 5 TABLET ORAL at 16:56

## 2020-01-24 RX ADMIN — AMLODIPINE BESYLATE 2.5 MG: 2.5 TABLET ORAL at 11:07

## 2020-01-24 RX ADMIN — BENZOCAINE AND MENTHOL, UNSPECIFIED FORM 1 LOZENGE: 15; 3.6 LOZENGE ORAL at 17:02

## 2020-01-24 RX ADMIN — ONDANSETRON 4 MG: 2 INJECTION INTRAMUSCULAR; INTRAVENOUS at 15:28

## 2020-01-24 RX ADMIN — AMLODIPINE BESYLATE 2.5 MG: 2.5 TABLET ORAL at 05:38

## 2020-01-24 RX ADMIN — LINEZOLID 600 MG: 600 TABLET, FILM COATED ORAL at 05:39

## 2020-01-24 RX ADMIN — BENZOCAINE AND MENTHOL, UNSPECIFIED FORM 1 LOZENGE: 15; 3.6 LOZENGE ORAL at 21:14

## 2020-01-24 RX ADMIN — LINEZOLID 600 MG: 600 TABLET, FILM COATED ORAL at 16:56

## 2020-01-24 RX ADMIN — LEVOTHYROXINE SODIUM 175 MCG: 150 TABLET ORAL at 05:38

## 2020-01-24 ASSESSMENT — ENCOUNTER SYMPTOMS
SPEECH CHANGE: 0
NERVOUS/ANXIOUS: 1
POLYDIPSIA: 0
EYE DISCHARGE: 0
NECK PAIN: 0
HEADACHES: 0
SPUTUM PRODUCTION: 0
COUGH: 0
ORTHOPNEA: 0
BRUISES/BLEEDS EASILY: 0
FOCAL WEAKNESS: 0
SENSORY CHANGE: 0
EYE PAIN: 0
FEVER: 0
NAUSEA: 0
MYALGIAS: 0
ABDOMINAL PAIN: 0
PALPITATIONS: 0
BACK PAIN: 1
CHILLS: 0
CHILLS: 1
SHORTNESS OF BREATH: 0
VOMITING: 0
DIZZINESS: 0

## 2020-01-24 ASSESSMENT — LIFESTYLE VARIABLES: SUBSTANCE_ABUSE: 0

## 2020-01-24 NOTE — PROGRESS NOTES
Night shift:    Pt is A&Ox 4  Ambulated: Yes  Up stby, steady gait.   Voiding: Yes  Last BM: 1/22   Pain: Declined pain meds on night shift    Pt's FSBG's labile last night:    @2133 = 272  (5 U regular insulin + 15 U lantus given)    (Pt admits she ate some candy before)    @0548 = 45    (Hypoglycemic protocol was initiated, 4 glucose tabs & 1 cranberry juice was given)    @0624 = 82

## 2020-01-24 NOTE — ASSESSMENT & PLAN NOTE
Uncontrolled.  Was on losartan at home before admission.  Losartan held due to renal failure  Started on amlodipine, will increase dose to 5 mg today  1/25 blood pressure is stable  Blood pressure improved

## 2020-01-24 NOTE — PROGRESS NOTES
Nephrology Daily Progress Note    Date of Service  1/24/2020    Chief Complaint  64 y.o. female admitted 1/18/2020 with infected spinal cord stimulator, status post surgical removal.  Her hospitalization has been complicated by acute kidney injury    Interval Problem Update  Patient was very anxious/teary, went through a mild panic attack when she found out that she has to stay in the hospital for renal failure and possible dialysis.    Review of Systems  Review of Systems   Constitutional: Negative for chills, fever and malaise/fatigue.   Respiratory: Negative for cough and shortness of breath.    Cardiovascular: Negative for chest pain and leg swelling.   Gastrointestinal: Negative for nausea and vomiting.   Genitourinary: Negative for dysuria, frequency and urgency.   Psychiatric/Behavioral: The patient is nervous/anxious.         Physical Exam  Temp:  [36.1 °C (97 °F)-36.6 °C (97.9 °F)] 36.6 °C (97.9 °F)  Pulse:  [82-99] 91  Resp:  [16-18] 17  BP: (153-168)/(70-79) 162/74  SpO2:  [94 %-98 %] 98 %    Physical Exam  Vitals signs and nursing note reviewed.   Constitutional:       General: She is not in acute distress.     Appearance: Normal appearance. She is well-developed. She is not diaphoretic.   HENT:      Head: Normocephalic and atraumatic.      Right Ear: External ear normal.      Left Ear: External ear normal.      Nose: Nose normal.   Eyes:      General: No scleral icterus.        Right eye: No discharge.         Left eye: No discharge.      Conjunctiva/sclera: Conjunctivae normal.   Cardiovascular:      Rate and Rhythm: Normal rate and regular rhythm.      Heart sounds: No murmur.   Pulmonary:      Effort: Pulmonary effort is normal. No respiratory distress.      Breath sounds: Normal breath sounds.   Musculoskeletal:         General: No swelling or tenderness.   Skin:     General: Skin is warm and dry.      Findings: No erythema.   Neurological:      General: No focal deficit present.      Mental Status:  She is alert and oriented to person, place, and time.      Cranial Nerves: No cranial nerve deficit.   Psychiatric:         Mood and Affect: Mood is anxious and depressed.         Behavior: Behavior is agitated.         Fluids  No intake or output data in the 24 hours ending 01/24/20 1257    Laboratory  Recent Labs     01/23/20  0408   WBC 7.6   RBC 3.67*   HEMOGLOBIN 11.1*   HEMATOCRIT 35.3*   MCV 96.2   MCH 30.2   MCHC 31.4*   RDW 44.1   PLATELETCT 279   MPV 9.5     Recent Labs     01/22/20  0250 01/23/20  0408 01/24/20  0937   SODIUM 143 139 139   POTASSIUM 4.1 4.1 4.8   CHLORIDE 108 104 105   CO2 23 23 25   GLUCOSE 50* 192* 161*   BUN 31* 34* 36*   CREATININE 5.30* 5.50* 6.43*   CALCIUM 8.2* 8.0* 8.5         No results for input(s): NTPROBNP in the last 72 hours.        Imaging  US-RENAL   Final Result      1.  Unremarkable kidneys.   2.  No hydronephrosis.   3.  Limited evaluation of bladder.            Assessment/Plan  1 RHEA: Creatinine is worse today.  2 sepsis  3 anemia  4 hypoalbuminemia  5 uncontrolled hypertension   plan  We need to plan for HD as long as no renal recovery, however patient became very anxious and in a panic mode when she found out, she wanted to discuss the matter further with her family  Renal diet  Daily labs  Renal dose all meds  Avoid nephrotoxins  Prognosis guarded.  Discussed with Dr. Marte  Over 50% of this 35 minute visit was spent on counseling and coordination of care regarding diet, side effects, and complication.

## 2020-01-24 NOTE — PROGRESS NOTES
Hospital Medicine Daily Progress Note    Date of Service  1/24/2020    Chief Complaint  64 y.o. female admitted 1/18/2020 with wound infection, back pain     Hospital Course    This is a 65 y/o F with a past medical history of Insulin dependent diabetes mellitus, hypertension, recently underwent a replacement of her spinal cord stimulator on 12/16/2019. She had developed an nearby infection around the stimulator for which she was given outpatient antibiotics including Keflex, however despite taking these antibiotics patient now complains of worsening pain around that area. Pt admitted for further treatment. Neurosurgery has been consulted appreciate rec.         Interval Problem Update    Patient is emotional, missing her dogs, wants to go home.  Repeat BMP pending  Hypoglycemia noted yearly in the morning.  Will reduce dose of Lantus to 5 units at night    Hypertension is uncontrolled.  Will increase dose of amlodipine to 5 mg    I discussed antibiotic management with Dr. Reeves/ID yesterday.  According to her, linezolid is chosen despite cultures consistent with MSSA, as a safest antibiotic in the setting of acute renal failure    Consultants/Specialty  Neurosurgery   Infection disease     Code Status  Full Code     Disposition  Home when medically clear    Review of Systems  Review of Systems   Constitutional: Positive for chills. Negative for fever.   HENT: Negative for ear pain and tinnitus.    Eyes: Negative for pain and discharge.   Respiratory: Negative for cough, sputum production and shortness of breath.    Cardiovascular: Negative for chest pain, palpitations and orthopnea.   Gastrointestinal: Negative for abdominal pain, nausea and vomiting.   Genitourinary: Negative for dysuria and urgency.   Musculoskeletal: Positive for back pain. Negative for joint pain, myalgias and neck pain.   Neurological: Negative for dizziness, sensory change, speech change, focal weakness and headaches.    Endo/Heme/Allergies: Negative for environmental allergies and polydipsia. Does not bruise/bleed easily.   Psychiatric/Behavioral: Negative for substance abuse and suicidal ideas.   All other systems reviewed and are negative.       Physical Exam  Temp:  [36.1 °C (97 °F)-36.6 °C (97.9 °F)] 36.6 °C (97.9 °F)  Pulse:  [82-99] 91  Resp:  [16-18] 17  BP: (153-168)/(70-79) 162/74  SpO2:  [94 %-98 %] 98 %    Physical Exam  Vitals signs and nursing note reviewed.   HENT:      Head: Normocephalic and atraumatic.   Eyes:      General: No scleral icterus.        Right eye: No discharge.         Left eye: No discharge.      Conjunctiva/sclera: Conjunctivae normal.   Cardiovascular:      Rate and Rhythm: Normal rate.      Heart sounds: No murmur. No gallop.    Pulmonary:      Effort: Pulmonary effort is normal.      Breath sounds: Normal breath sounds. No stridor.   Abdominal:      General: Bowel sounds are normal. There is no distension.      Tenderness: There is no tenderness.   Musculoskeletal:      Comments: Surgical site CDI   Skin:     General: Skin is warm.      Coloration: Skin is not jaundiced.   Neurological:      Mental Status: She is alert and oriented to person, place, and time. Mental status is at baseline.   Psychiatric:         Mood and Affect: Mood normal.     I examined the patient on 1/24.  No significant changes on physical exam from 1/23 noted except as documented above.    Fluids  No intake or output data in the 24 hours ending 01/24/20 1026    Laboratory  Recent Labs     01/23/20  0408   WBC 7.6   RBC 3.67*   HEMOGLOBIN 11.1*   HEMATOCRIT 35.3*   MCV 96.2   MCH 30.2   MCHC 31.4*   RDW 44.1   PLATELETCT 279   MPV 9.5     Recent Labs     01/22/20  0250 01/23/20  0408   SODIUM 143 139   POTASSIUM 4.1 4.1   CHLORIDE 108 104   CO2 23 23   GLUCOSE 50* 192*   BUN 31* 34*   CREATININE 5.30* 5.50*   CALCIUM 8.2* 8.0*                   Imaging  US-RENAL   Final Result      1.  Unremarkable kidneys.   2.  No  hydronephrosis.   3.  Limited evaluation of bladder.           Assessment/Plan  * Post-operative wound abscess- (present on admission)  Assessment & Plan  Involving her spinal stimulator area.  Received course of IV  Vancomycin and Zosyn   Neurosurgery following had surgery and removal of the spine stimulator yesterday  ID also following  Appreciate rec.   On linezolid until 2/3/20 per ID.  Linezolid is chosen as a safest antibiotic in the setting of acute renal failure, despite culture positive for MSSA.  Discussed with ID Dr. Reeves    Hypoglycemia  Assessment & Plan  1/22 blood sugar 50 early in the morning.  Will reduce dose of Lantus from 25 to 15 units at night  Hypoglycemia product  1/24 reoccurs.  Secondary to renal failure and decreased renal insulin clearance.  Will reduce dose of Lantus to 5 units at night    Type 1 diabetes mellitus with neurological manifestations, uncontrolled (HCC)- (present on admission)  Assessment & Plan  Uncontrolled  Last a1c was 10.7  Cont Degludec.  Dose decreased to 15 units from 25 on 1/22 due to hypoglycemia  Continue Insulin-sliding scale, accu-checks and hypoglycemia protocol.  Monitor   1/24 we will further decrease dose of Lantus to 5 units due to hypoglycemia    Hypothyroidism, postsurgical- (present on admission)  Assessment & Plan  Resume home dose of synthroid    Hypertension  Assessment & Plan  Uncontrolled.  Was on losartan at home before admission.  Losartan held due to renal failure  Started on amlodipine, will increase dose to 5 mg today    RHEA (acute kidney injury) (HCC)  Assessment & Plan  Probably polyuric, but no urine output documented  Etiology unclear  Most likely ATN per nephrology  Bladder scan did not reveal retention.  Kidney ultrasound unremarkable; no hydronephrosis.  Ordered UA to look for proteinuria and sediment  We will follow with nephrology recommendations.  Avoid nephrotoxins  Uric acid and CPK not elevated.  Follow with UA, monitor input  and output, monitor and correct electrolytes as needed  Linezolid is safe, no need for dose adjustment    Tobacco abuse- (present on admission)  Assessment & Plan  Smoking cessation counseling provided.         VTE prophylaxis: scd

## 2020-01-24 NOTE — PROGRESS NOTES
Neurosurgery Progress Note    Subjective:  Sitting up in bed. Complains of n/v.     EXAM  A&O x3. Fluent Speech.   4 PERRL, EOMI  Strength: Bilateral bicep, tricep, deltoid,  5/5.                   Bilateral IP, DF, PF 5/5.  Sensation: Intact throughout.   Incisions: dressings c/d/i.      Right flank with minimal redness around incision, small drainage.  Thoracic incision no redness, inferior drainage,  Eating, drinking.  Voiding. +BM, 2020.  Pain controlled.       BP  Min: 153/70  Max: 168/77  Pulse  Av.7  Min: 82  Max: 99  Resp  Av.8  Min: 16  Max: 18  Temp  Av.3 °C (97.4 °F)  Min: 36.1 °C (97 °F)  Max: 36.6 °C (97.9 °F)  SpO2  Av.8 %  Min: 94 %  Max: 98 %    No data recorded    Recent Labs     20  0408   WBC 7.6   RBC 3.67*   HEMOGLOBIN 11.1*   HEMATOCRIT 35.3*   MCV 96.2   MCH 30.2   MCHC 31.4*   RDW 44.1   PLATELETCT 279   MPV 9.5     Recent Labs     20  0250 20  0408   SODIUM 143 139   POTASSIUM 4.1 4.1   CHLORIDE 108 104   CO2 23 23   GLUCOSE 50* 192*   BUN 31* 34*   CREATININE 5.30* 5.50*   CALCIUM 8.2* 8.0*               Intake/Output       20 07 - 20 0659 20 07 - 20 0659       Total  Total       Intake    Total Intake -- -- -- -- -- --       Output    Stool  --  -- --  --  -- --    Number of Times Stooled 1 x -- 1 x -- -- --    Total Output -- -- -- -- -- --       Net I/O     -- -- -- -- -- --          No intake or output data in the 24 hours ending 20 1004         • insulin glargine  5 Units QHS   • haloperidol lactate  1 mg Q15 MIN PRN   • diphenhydrAMINE  12.5 mg Q15 MIN PRN   • oxyCODONE-acetaminophen  1 Tab Once PRN    Or   • oxyCODONE-acetaminophen  2 Tab Once PRN   • meperidine  25 mg Q5 MIN PRN   • labetalol  5 mg Q5 MIN PRN   • albuterol  2.5 mg Q10 MIN PRN   • nicotine  21 mg Daily-0600   • amLODIPine  2.5 mg Q DAY   • HYDROmorphone  0.5 mg Q2HRS PRN   • nicotine polacrilex  2  mg Q2HRS PRN   • ALPRAZolam  0.25 mg BID PRN   • linezolid  600 mg Q12HRS   • oxyCODONE-acetaminophen  1-2 Tab Q4HRS PRN   • insulin regular  2-9 Units 4X/DAY ACHS    And   • glucose  16 g Q15 MIN PRN    And   • dextrose 10% bolus  250 mL Q15 MIN PRN   • ondansetron  4 mg Q4HRS PRN   • ondansetron  4 mg Q4HRS PRN   • prochlorperazine  5-10 mg Q4HRS PRN   • promethazine  12.5-25 mg Q4HRS PRN   • promethazine  12.5-25 mg Q4HRS PRN   • senna-docusate  2 Tab BID    And   • polyethylene glycol/lytes  1 Packet QDAY PRN    And   • magnesium hydroxide  30 mL QDAY PRN    And   • bisacodyl  10 mg QDAY PRN   • Respiratory Care per Protocol   Continuous RT   • atorvastatin  10 mg Nightly   • levothyroxine  175 mcg AM ES       Assessment and Plan:  POD #2 Removal SCS  Prophylactic anticoagulation: no         Start date/time: tbd    Microbiology: final  Thoracic: moderate gram + cocci  Right flank: Staph. Aureus       Plan:  Stable neuro  ID following for antibiotics  Nephrology managing ATN   Okay to change thoracic and right flank dressing.   Maintain bowel protocol    Okay to DC per NSG POV. Follow up 2 weeks for suture removal. Keep incisions clean and dry. Discharge care and follow up discussed with patient at bedside.     Will follow on an intermittent basis. Call with any questions or concerns.

## 2020-01-25 ENCOUNTER — APPOINTMENT (OUTPATIENT)
Dept: RADIOLOGY | Facility: MEDICAL CENTER | Age: 65
DRG: 028 | End: 2020-01-25
Attending: SURGERY
Payer: MEDICARE

## 2020-01-25 ENCOUNTER — APPOINTMENT (OUTPATIENT)
Dept: RADIOLOGY | Facility: MEDICAL CENTER | Age: 65
DRG: 028 | End: 2020-01-25
Attending: INTERNAL MEDICINE
Payer: MEDICARE

## 2020-01-25 ENCOUNTER — ANESTHESIA (OUTPATIENT)
Dept: SURGERY | Facility: MEDICAL CENTER | Age: 65
DRG: 028 | End: 2020-01-25
Payer: MEDICARE

## 2020-01-25 ENCOUNTER — ANESTHESIA EVENT (OUTPATIENT)
Dept: SURGERY | Facility: MEDICAL CENTER | Age: 65
DRG: 028 | End: 2020-01-25
Payer: MEDICARE

## 2020-01-25 PROBLEM — R07.9 CHEST PAIN: Status: ACTIVE | Noted: 2020-01-25

## 2020-01-25 PROBLEM — R11.2 NAUSEA & VOMITING: Status: ACTIVE | Noted: 2020-01-25

## 2020-01-25 PROBLEM — F41.9 ANXIETY: Status: ACTIVE | Noted: 2020-01-25

## 2020-01-25 LAB
ANION GAP SERPL CALC-SCNC: 10 MMOL/L (ref 0–11.9)
BUN SERPL-MCNC: 37 MG/DL (ref 8–22)
CALCIUM SERPL-MCNC: 8.2 MG/DL (ref 8.5–10.5)
CHLORIDE SERPL-SCNC: 105 MMOL/L (ref 96–112)
CO2 SERPL-SCNC: 25 MMOL/L (ref 20–33)
CREAT SERPL-MCNC: 6.15 MG/DL (ref 0.5–1.4)
GLUCOSE BLD-MCNC: 130 MG/DL (ref 65–99)
GLUCOSE BLD-MCNC: 224 MG/DL (ref 65–99)
GLUCOSE BLD-MCNC: 241 MG/DL (ref 65–99)
GLUCOSE BLD-MCNC: 269 MG/DL (ref 65–99)
GLUCOSE SERPL-MCNC: 91 MG/DL (ref 65–99)
HAV IGM SERPL QL IA: NEGATIVE
HBV CORE IGM SER QL: NEGATIVE
HBV SURFACE AB SERPL IA-ACNC: 7.67 MIU/ML (ref 0–10)
HBV SURFACE AG SER QL: NEGATIVE
HCV AB SER QL: NEGATIVE
POTASSIUM SERPL-SCNC: 4.9 MMOL/L (ref 3.6–5.5)
SODIUM SERPL-SCNC: 140 MMOL/L (ref 135–145)

## 2020-01-25 PROCEDURE — 80074 ACUTE HEPATITIS PANEL: CPT

## 2020-01-25 PROCEDURE — 99233 SBSQ HOSP IP/OBS HIGH 50: CPT | Performed by: INTERNAL MEDICINE

## 2020-01-25 PROCEDURE — 93010 ELECTROCARDIOGRAM REPORT: CPT | Performed by: INTERNAL MEDICINE

## 2020-01-25 PROCEDURE — 700111 HCHG RX REV CODE 636 W/ 250 OVERRIDE (IP)

## 2020-01-25 PROCEDURE — 501838 HCHG SUTURE GENERAL: Performed by: SURGERY

## 2020-01-25 PROCEDURE — A6402 STERILE GAUZE <= 16 SQ IN: HCPCS | Performed by: SURGERY

## 2020-01-25 PROCEDURE — 700102 HCHG RX REV CODE 250 W/ 637 OVERRIDE(OP): Performed by: HOSPITALIST

## 2020-01-25 PROCEDURE — A9270 NON-COVERED ITEM OR SERVICE: HCPCS | Performed by: HOSPITALIST

## 2020-01-25 PROCEDURE — 80048 BASIC METABOLIC PNL TOTAL CA: CPT

## 2020-01-25 PROCEDURE — 700102 HCHG RX REV CODE 250 W/ 637 OVERRIDE(OP): Performed by: INTERNAL MEDICINE

## 2020-01-25 PROCEDURE — 700111 HCHG RX REV CODE 636 W/ 250 OVERRIDE (IP): Performed by: ANESTHESIOLOGY

## 2020-01-25 PROCEDURE — B5181ZA FLUOROSCOPY OF SUPERIOR VENA CAVA USING LOW OSMOLAR CONTRAST, GUIDANCE: ICD-10-PCS | Performed by: SURGERY

## 2020-01-25 PROCEDURE — 82962 GLUCOSE BLOOD TEST: CPT

## 2020-01-25 PROCEDURE — 90935 HEMODIALYSIS ONE EVALUATION: CPT

## 2020-01-25 PROCEDURE — 160002 HCHG RECOVERY MINUTES (STAT): Performed by: SURGERY

## 2020-01-25 PROCEDURE — 0JH63XZ INSERTION OF TUNNELED VASCULAR ACCESS DEVICE INTO CHEST SUBCUTANEOUS TISSUE AND FASCIA, PERCUTANEOUS APPROACH: ICD-10-PCS | Performed by: SURGERY

## 2020-01-25 PROCEDURE — A9270 NON-COVERED ITEM OR SERVICE: HCPCS | Performed by: INTERNAL MEDICINE

## 2020-01-25 PROCEDURE — 5A1D70Z PERFORMANCE OF URINARY FILTRATION, INTERMITTENT, LESS THAN 6 HOURS PER DAY: ICD-10-PCS | Performed by: INTERNAL MEDICINE

## 2020-01-25 PROCEDURE — 160048 HCHG OR STATISTICAL LEVEL 1-5: Performed by: SURGERY

## 2020-01-25 PROCEDURE — 36415 COLL VENOUS BLD VENIPUNCTURE: CPT

## 2020-01-25 PROCEDURE — 770001 HCHG ROOM/CARE - MED/SURG/GYN PRIV*

## 2020-01-25 PROCEDURE — 160035 HCHG PACU - 1ST 60 MINS PHASE I: Performed by: SURGERY

## 2020-01-25 PROCEDURE — 93005 ELECTROCARDIOGRAM TRACING: CPT | Performed by: INTERNAL MEDICINE

## 2020-01-25 PROCEDURE — A9270 NON-COVERED ITEM OR SERVICE: HCPCS | Performed by: NEUROLOGICAL SURGERY

## 2020-01-25 PROCEDURE — 700111 HCHG RX REV CODE 636 W/ 250 OVERRIDE (IP): Performed by: SURGERY

## 2020-01-25 PROCEDURE — 700111 HCHG RX REV CODE 636 W/ 250 OVERRIDE (IP): Performed by: HOSPITALIST

## 2020-01-25 PROCEDURE — 160028 HCHG SURGERY MINUTES - 1ST 30 MINS LEVEL 3: Performed by: SURGERY

## 2020-01-25 PROCEDURE — 02HV33Z INSERTION OF INFUSION DEVICE INTO SUPERIOR VENA CAVA, PERCUTANEOUS APPROACH: ICD-10-PCS | Performed by: SURGERY

## 2020-01-25 PROCEDURE — B548ZZA ULTRASONOGRAPHY OF SUPERIOR VENA CAVA, GUIDANCE: ICD-10-PCS | Performed by: SURGERY

## 2020-01-25 PROCEDURE — 160009 HCHG ANES TIME/MIN: Performed by: SURGERY

## 2020-01-25 PROCEDURE — 700101 HCHG RX REV CODE 250: Performed by: ANESTHESIOLOGY

## 2020-01-25 PROCEDURE — C1750 CATH, HEMODIALYSIS,LONG-TERM: HCPCS | Performed by: SURGERY

## 2020-01-25 PROCEDURE — 71045 X-RAY EXAM CHEST 1 VIEW: CPT

## 2020-01-25 PROCEDURE — 700111 HCHG RX REV CODE 636 W/ 250 OVERRIDE (IP): Performed by: INTERNAL MEDICINE

## 2020-01-25 PROCEDURE — 700102 HCHG RX REV CODE 250 W/ 637 OVERRIDE(OP): Performed by: NEUROLOGICAL SURGERY

## 2020-01-25 PROCEDURE — 700101 HCHG RX REV CODE 250: Performed by: SURGERY

## 2020-01-25 PROCEDURE — 86706 HEP B SURFACE ANTIBODY: CPT

## 2020-01-25 DEVICE — CATHETER PALINDROME 23CM - (5EA/PK): Type: IMPLANTABLE DEVICE | Status: FUNCTIONAL

## 2020-01-25 RX ORDER — OXYCODONE HCL 5 MG/5 ML
10 SOLUTION, ORAL ORAL
Status: DISCONTINUED | OUTPATIENT
Start: 2020-01-25 | End: 2020-01-25 | Stop reason: HOSPADM

## 2020-01-25 RX ORDER — DIPHENHYDRAMINE HYDROCHLORIDE 50 MG/ML
12.5 INJECTION INTRAMUSCULAR; INTRAVENOUS
Status: DISCONTINUED | OUTPATIENT
Start: 2020-01-25 | End: 2020-01-25 | Stop reason: HOSPADM

## 2020-01-25 RX ORDER — LIDOCAINE HYDROCHLORIDE 20 MG/ML
INJECTION, SOLUTION EPIDURAL; INFILTRATION; INTRACAUDAL; PERINEURAL PRN
Status: DISCONTINUED | OUTPATIENT
Start: 2020-01-25 | End: 2020-01-25 | Stop reason: SURG

## 2020-01-25 RX ORDER — HYDROMORPHONE HYDROCHLORIDE 1 MG/ML
0.2 INJECTION, SOLUTION INTRAMUSCULAR; INTRAVENOUS; SUBCUTANEOUS
Status: DISCONTINUED | OUTPATIENT
Start: 2020-01-25 | End: 2020-01-25 | Stop reason: HOSPADM

## 2020-01-25 RX ORDER — HEPARIN SODIUM 1000 [USP'U]/ML
2000 INJECTION, SOLUTION INTRAVENOUS; SUBCUTANEOUS
Status: COMPLETED | OUTPATIENT
Start: 2020-01-25 | End: 2020-01-26

## 2020-01-25 RX ORDER — ONDANSETRON 2 MG/ML
INJECTION INTRAMUSCULAR; INTRAVENOUS PRN
Status: DISCONTINUED | OUTPATIENT
Start: 2020-01-25 | End: 2020-01-25 | Stop reason: SURG

## 2020-01-25 RX ORDER — HALOPERIDOL 5 MG/ML
1 INJECTION INTRAMUSCULAR EVERY 4 HOURS PRN
Status: DISCONTINUED | OUTPATIENT
Start: 2020-01-25 | End: 2020-01-25 | Stop reason: HOSPADM

## 2020-01-25 RX ORDER — BUPIVACAINE HYDROCHLORIDE AND EPINEPHRINE 5; 5 MG/ML; UG/ML
INJECTION, SOLUTION EPIDURAL; INTRACAUDAL; PERINEURAL
Status: DISCONTINUED | OUTPATIENT
Start: 2020-01-25 | End: 2020-01-25 | Stop reason: HOSPADM

## 2020-01-25 RX ORDER — HEPARIN SODIUM,PORCINE 1000/ML
VIAL (ML) INJECTION
Status: DISCONTINUED | OUTPATIENT
Start: 2020-01-25 | End: 2020-01-25 | Stop reason: HOSPADM

## 2020-01-25 RX ORDER — DEXAMETHASONE SODIUM PHOSPHATE 4 MG/ML
INJECTION, SOLUTION INTRA-ARTICULAR; INTRALESIONAL; INTRAMUSCULAR; INTRAVENOUS; SOFT TISSUE PRN
Status: DISCONTINUED | OUTPATIENT
Start: 2020-01-25 | End: 2020-01-25 | Stop reason: SURG

## 2020-01-25 RX ORDER — OXYCODONE HCL 5 MG/5 ML
5 SOLUTION, ORAL ORAL
Status: DISCONTINUED | OUTPATIENT
Start: 2020-01-25 | End: 2020-01-25 | Stop reason: HOSPADM

## 2020-01-25 RX ORDER — SUCRALFATE 1 G/1
1 TABLET ORAL EVERY 6 HOURS
Status: DISPENSED | OUTPATIENT
Start: 2020-01-25 | End: 2020-01-25

## 2020-01-25 RX ORDER — HEPARIN SODIUM 1000 [USP'U]/ML
3800 INJECTION, SOLUTION INTRAVENOUS; SUBCUTANEOUS
Status: COMPLETED | OUTPATIENT
Start: 2020-01-26 | End: 2020-01-26

## 2020-01-25 RX ORDER — HALOPERIDOL 5 MG/ML
1 INJECTION INTRAMUSCULAR
Status: DISCONTINUED | OUTPATIENT
Start: 2020-01-25 | End: 2020-01-25 | Stop reason: HOSPADM

## 2020-01-25 RX ORDER — CEFAZOLIN SODIUM 1 G/3ML
INJECTION, POWDER, FOR SOLUTION INTRAMUSCULAR; INTRAVENOUS PRN
Status: DISCONTINUED | OUTPATIENT
Start: 2020-01-25 | End: 2020-01-25 | Stop reason: SURG

## 2020-01-25 RX ORDER — ONDANSETRON 2 MG/ML
4 INJECTION INTRAMUSCULAR; INTRAVENOUS
Status: DISCONTINUED | OUTPATIENT
Start: 2020-01-25 | End: 2020-01-25 | Stop reason: HOSPADM

## 2020-01-25 RX ORDER — HYDROMORPHONE HYDROCHLORIDE 1 MG/ML
0.1 INJECTION, SOLUTION INTRAMUSCULAR; INTRAVENOUS; SUBCUTANEOUS
Status: DISCONTINUED | OUTPATIENT
Start: 2020-01-25 | End: 2020-01-25 | Stop reason: HOSPADM

## 2020-01-25 RX ORDER — HYDROMORPHONE HYDROCHLORIDE 1 MG/ML
0.4 INJECTION, SOLUTION INTRAMUSCULAR; INTRAVENOUS; SUBCUTANEOUS
Status: DISCONTINUED | OUTPATIENT
Start: 2020-01-25 | End: 2020-01-25 | Stop reason: HOSPADM

## 2020-01-25 RX ORDER — OMEPRAZOLE 20 MG/1
20 CAPSULE, DELAYED RELEASE ORAL DAILY
Status: DISCONTINUED | OUTPATIENT
Start: 2020-01-25 | End: 2020-01-30 | Stop reason: HOSPADM

## 2020-01-25 RX ADMIN — AMLODIPINE BESYLATE 5 MG: 5 TABLET ORAL at 05:35

## 2020-01-25 RX ADMIN — INSULIN HUMAN 5 UNITS: 100 INJECTION, SOLUTION PARENTERAL at 21:35

## 2020-01-25 RX ADMIN — ONDANSETRON 4 MG: 4 TABLET, ORALLY DISINTEGRATING ORAL at 10:34

## 2020-01-25 RX ADMIN — OXYCODONE HYDROCHLORIDE AND ACETAMINOPHEN 2 TABLET: 5; 325 TABLET ORAL at 07:21

## 2020-01-25 RX ADMIN — FENTANYL CITRATE 100 MCG: 50 INJECTION, SOLUTION INTRAMUSCULAR; INTRAVENOUS at 17:51

## 2020-01-25 RX ADMIN — MIDAZOLAM HYDROCHLORIDE 2 MG: 1 INJECTION, SOLUTION INTRAMUSCULAR; INTRAVENOUS at 17:51

## 2020-01-25 RX ADMIN — ALPRAZOLAM 0.25 MG: 0.25 TABLET ORAL at 19:49

## 2020-01-25 RX ADMIN — HALOPERIDOL LACTATE 1 MG: 5 INJECTION, SOLUTION INTRAMUSCULAR at 19:05

## 2020-01-25 RX ADMIN — ONDANSETRON 4 MG: 2 INJECTION INTRAMUSCULAR; INTRAVENOUS at 21:47

## 2020-01-25 RX ADMIN — BENZOCAINE AND MENTHOL, UNSPECIFIED FORM 1 LOZENGE: 15; 3.6 LOZENGE ORAL at 05:34

## 2020-01-25 RX ADMIN — DEXAMETHASONE SODIUM PHOSPHATE 4 MG: 4 INJECTION, SOLUTION INTRA-ARTICULAR; INTRALESIONAL; INTRAMUSCULAR; INTRAVENOUS; SOFT TISSUE at 17:59

## 2020-01-25 RX ADMIN — ONDANSETRON 4 MG: 2 INJECTION INTRAMUSCULAR; INTRAVENOUS at 17:59

## 2020-01-25 RX ADMIN — HEPARIN SODIUM 2000 UNITS: 1000 INJECTION, SOLUTION INTRAVENOUS; SUBCUTANEOUS at 21:40

## 2020-01-25 RX ADMIN — CEFAZOLIN 2 G: 330 INJECTION, POWDER, FOR SOLUTION INTRAMUSCULAR; INTRAVENOUS at 17:59

## 2020-01-25 RX ADMIN — LINEZOLID 600 MG: 600 TABLET, FILM COATED ORAL at 05:35

## 2020-01-25 RX ADMIN — ATORVASTATIN CALCIUM 10 MG: 10 TABLET, FILM COATED ORAL at 21:38

## 2020-01-25 RX ADMIN — PROPOFOL 200 MG: 10 INJECTION, EMULSION INTRAVENOUS at 17:56

## 2020-01-25 RX ADMIN — LIDOCAINE HYDROCHLORIDE 100 MG: 20 INJECTION, SOLUTION EPIDURAL; INFILTRATION; INTRACAUDAL at 17:56

## 2020-01-25 RX ADMIN — OXYCODONE HYDROCHLORIDE AND ACETAMINOPHEN 1 TABLET: 5; 325 TABLET ORAL at 02:42

## 2020-01-25 RX ADMIN — LEVOTHYROXINE SODIUM 175 MCG: 150 TABLET ORAL at 05:34

## 2020-01-25 ASSESSMENT — ENCOUNTER SYMPTOMS
NAUSEA: 0
DEPRESSION: 1
SPUTUM PRODUCTION: 0
VOMITING: 1
SENSORY CHANGE: 0
NECK PAIN: 0
COUGH: 0
HEADACHES: 0
ORTHOPNEA: 0
EYE PAIN: 0
FEVER: 0
EYE DISCHARGE: 0
MYALGIAS: 0
POLYDIPSIA: 0
SPEECH CHANGE: 0
CHILLS: 1
BRUISES/BLEEDS EASILY: 0
DIZZINESS: 0
PALPITATIONS: 0
CHILLS: 0
BACK PAIN: 1
SHORTNESS OF BREATH: 0
FOCAL WEAKNESS: 0
ABDOMINAL PAIN: 0
NAUSEA: 1
VOMITING: 0
NERVOUS/ANXIOUS: 1

## 2020-01-25 ASSESSMENT — LIFESTYLE VARIABLES: SUBSTANCE_ABUSE: 0

## 2020-01-25 NOTE — ASSESSMENT & PLAN NOTE
Appears to be noncardiogenic, exacerbated by deep breath  Ordered EKG and chest x-ray   Tylenol as needed

## 2020-01-25 NOTE — PROGRESS NOTES
Nephrology Daily Progress Note    Date of Service  1/25/2020    Chief Complaint  64 y.o. female admitted 1/18/2020 with infected spinal cord stimulator, status post surgical removal.  Her hospitalization has been complicated by acute kidney injury    Interval Problem Update  Patient was very anxious/teary, went through a mild panic attack when she found out that she has to stay in the hospital for renal failure and possible dialysis.  1/25 pt is still very anxious , but agreeing to dialysis    Review of Systems  Review of Systems   Constitutional: Negative for chills, fever and malaise/fatigue.   Respiratory: Negative for cough and shortness of breath.    Cardiovascular: Negative for chest pain and leg swelling.   Gastrointestinal: Positive for nausea and vomiting.   Genitourinary: Negative for dysuria, frequency and urgency.   Psychiatric/Behavioral: Positive for depression. The patient is nervous/anxious.         Physical Exam  Temp:  [36.2 °C (97.1 °F)-36.9 °C (98.5 °F)] 36.3 °C (97.4 °F)  Pulse:  [76-90] 76  Resp:  [16-18] 18  BP: (132-188)/(65-79) 151/68  SpO2:  [91 %-98 %] 98 %    Physical Exam  Vitals signs and nursing note reviewed.   Constitutional:       General: She is not in acute distress.     Appearance: Normal appearance. She is well-developed. She is not diaphoretic.   HENT:      Head: Normocephalic and atraumatic.      Right Ear: External ear normal.      Left Ear: External ear normal.      Nose: Nose normal.   Eyes:      General: No scleral icterus.        Right eye: No discharge.         Left eye: No discharge.      Conjunctiva/sclera: Conjunctivae normal.   Cardiovascular:      Rate and Rhythm: Normal rate and regular rhythm.      Heart sounds: No murmur.   Pulmonary:      Effort: Pulmonary effort is normal. No respiratory distress.      Breath sounds: Normal breath sounds.   Musculoskeletal:         General: No swelling or tenderness.   Skin:     General: Skin is warm and dry.      Findings: No  erythema.   Neurological:      General: No focal deficit present.      Mental Status: She is alert and oriented to person, place, and time.      Cranial Nerves: No cranial nerve deficit.   Psychiatric:         Mood and Affect: Mood normal.         Behavior: Behavior normal.         Fluids    Intake/Output Summary (Last 24 hours) at 1/25/2020 1240  Last data filed at 1/25/2020 0900  Gross per 24 hour   Intake 880 ml   Output --   Net 880 ml       Laboratory  Recent Labs     01/23/20  0408   WBC 7.6   RBC 3.67*   HEMOGLOBIN 11.1*   HEMATOCRIT 35.3*   MCV 96.2   MCH 30.2   MCHC 31.4*   RDW 44.1   PLATELETCT 279   MPV 9.5     Recent Labs     01/23/20  0408 01/24/20  0937 01/25/20  0312   SODIUM 139 139 140   POTASSIUM 4.1 4.8 4.9   CHLORIDE 104 105 105   CO2 23 25 25   GLUCOSE 192* 161* 91   BUN 34* 36* 37*   CREATININE 5.50* 6.43* 6.15*   CALCIUM 8.0* 8.5 8.2*         No results for input(s): NTPROBNP in the last 72 hours.        Imaging  DX-CHEST-LIMITED (1 VIEW)   Final Result      1.  No acute cardiopulmonary abnormality.      US-RENAL   Final Result      1.  Unremarkable kidneys.   2.  No hydronephrosis.   3.  Limited evaluation of bladder.            Assessment/Plan  1 RHEA: no signs of renal recovery yet, mild uremic symptoms  2 sepsis  3 anemia  4 hypoalbuminemia  5 uncontrolled hypertension   plan  Plan to start HD for uremia, I D/W pt the risks and benefits of HD she agrees to starting HD( also she spoke to her fiend about HD)   Renal diet  Daily labs  Renal dose all meds  Avoid nephrotoxins  Prognosis guarded.  Discussed with Dr. Marte and Dr Mendoza  Over 50% of this 35 minute visit was spent on counseling and coordination of care regarding diet, side effects, and complication.

## 2020-01-25 NOTE — PROGRESS NOTES
Neurosurgery Progress Note    Subjective:  Nauseous, planning for dialysis today  Denies changes to LE    EXAM      DF/PF intact. CDI  BP  Min: 132/65  Max: 188/79  Pulse  Av.8  Min: 76  Max: 90  Resp  Av.8  Min: 16  Max: 18  Temp  Av.5 °C (97.7 °F)  Min: 36.2 °C (97.1 °F)  Max: 36.9 °C (98.5 °F)  SpO2  Av %  Min: 91 %  Max: 98 %    No data recorded    Recent Labs     20  040   WBC 7.6   RBC 3.67*   HEMOGLOBIN 11.1*   HEMATOCRIT 35.3*   MCV 96.2   MCH 30.2   MCHC 31.4*   RDW 44.1   PLATELETCT 279   MPV 9.5     Recent Labs     20  0408 20  0937 20  0312   SODIUM 139 139 140   POTASSIUM 4.1 4.8 4.9   CHLORIDE 104 105 105   CO2  25 25   GLUCOSE 192* 161* 91   BUN 34* 36* 37*   CREATININE 5.50* 6.43* 6.15*   CALCIUM 8.0* 8.5 8.2*               Intake/Output       20 0700 - 20 0659 20 0700 - 20 0659      0187-0709 4558-0360 Total 9807-2539 2626-5254 Total       Intake    P.O.  --  580 580  300  -- 300    P.O. -- 580 580 300 -- 300    Total Intake -- 580 580 300 -- 300       Output    Urine  --  -- --  --  -- --    Number of Times Voided -- 2 x 2 x -- -- --    Stool  --  -- --  --  -- --    Number of Times Stooled 3 x -- 3 x -- -- --    Total Output -- -- -- -- -- --       Net I/O     -- 580 580 300 -- 300            Intake/Output Summary (Last 24 hours) at 2020 1152  Last data filed at 2020 0900  Gross per 24 hour   Intake 880 ml   Output --   Net 880 ml            • haloperidol lactate  1 mg Q4HRS PRN   • omeprazole  20 mg DAILY   • sucralfate  1 g Q6HRS   • insulin glargine  5 Units QHS   • amLODIPine  5 mg Q DAY   • benzocaine-menthol  1 Lozenge Q2HRS PRN   • diphenhydrAMINE  12.5 mg Q15 MIN PRN   • oxyCODONE-acetaminophen  1 Tab Once PRN    Or   • oxyCODONE-acetaminophen  2 Tab Once PRN   • meperidine  25 mg Q5 MIN PRN   • labetalol  5 mg Q5 MIN PRN   • albuterol  2.5 mg Q10 MIN PRN   • nicotine  21 mg Daily-0600   • HYDROmorphone  0.5  mg Q2HRS PRN   • nicotine polacrilex  2 mg Q2HRS PRN   • ALPRAZolam  0.25 mg BID PRN   • linezolid  600 mg Q12HRS   • oxyCODONE-acetaminophen  1-2 Tab Q4HRS PRN   • insulin regular  2-9 Units 4X/DAY ACHS    And   • glucose  16 g Q15 MIN PRN    And   • dextrose 10% bolus  250 mL Q15 MIN PRN   • ondansetron  4 mg Q4HRS PRN   • ondansetron  4 mg Q4HRS PRN   • prochlorperazine  5-10 mg Q4HRS PRN   • promethazine  12.5-25 mg Q4HRS PRN   • promethazine  12.5-25 mg Q4HRS PRN   • senna-docusate  2 Tab BID    And   • polyethylene glycol/lytes  1 Packet QDAY PRN    And   • magnesium hydroxide  30 mL QDAY PRN    And   • bisacodyl  10 mg QDAY PRN   • Respiratory Care per Protocol   Continuous RT   • atorvastatin  10 mg Nightly   • levothyroxine  175 mcg AM ES       Assessment and Plan:  POD #3 Removal SCS  Prophylactic anticoagulation: no         Start date/time: tbd    Microbiology: final  Thoracic: moderate gram + cocci  Right flank: Staph. Aureus       Plan:  ID following for antibiotics  Nephrology managing ATN   Okay to change thoracic and right flank dressing.   Maintain bowel protocol    Okay to DC per NSG POV. Follow up 2 weeks for suture removal. Keep incisions clean and dry. Discharge care and follow up discussed with patient at bedside.     Will follow on an intermittent basis. Call with any questions or concerns.

## 2020-01-25 NOTE — PROGRESS NOTES
1025-spoke with dr martin, regarding pt wanting to speak with nephrologist. Call out for Dr Najjar. Per CNA, pt vomiting  1034-zofran ODT given to pt, pt asking for emesis bags, washcloths and food; still awaiting call back from nephrologist; pt remains tearful, anxious and irritated that she hasn't had anyone come talk to her about what is going on

## 2020-01-25 NOTE — CARE PLAN
Problem: Safety  Goal: Will remain free from injury  Outcome: PROGRESSING AS EXPECTED  Intervention: Provide assistance with mobility  Flowsheets (Taken 1/25/2020 0012)  Assistance: Standby Assist  Ambulation Tolerance: Tolerates Well  Note:   Encourage to call for any assistance needed, call light within reach     Problem: Pain Management  Goal: Pain level will decrease to patient's comfort goal  Outcome: PROGRESSING AS EXPECTED  Intervention: Educate and implement non-pharmacologic comfort measures. Examples: relaxation, distration, play therapy, activity therapy, massage, etc.  Flowsheets (Taken 1/25/2020 0012)  Intervention: Relaxation Technique; Repositioned; Rest  Note:   Pain assessment q 2 hours, medicated as needed comfort measures provided.

## 2020-01-25 NOTE — PROGRESS NOTES
Hospital Medicine Daily Progress Note    Date of Service  1/25/2020    Chief Complaint  64 y.o. female admitted 1/18/2020 with wound infection, back pain     Hospital Course    This is a 65 y/o F with a past medical history of Insulin dependent diabetes mellitus, hypertension, recently underwent a replacement of her spinal cord stimulator on 12/16/2019. She had developed an nearby infection around the stimulator for which she was given outpatient antibiotics including Keflex, however despite taking these antibiotics patient now complains of worsening pain around that area. Pt admitted for further treatment. Neurosurgery has been consulted appreciate rec.         Interval Problem Update    Patient in no acute distress.  Stated that has been nauseated and vomited after breakfast.  Complaining of chest pain since last night, dull, in the middle of the chest, radiating to the back, on and off, 7 out of 10, exacerbated by deep breaths.  Also she had subjective dyspnea on exertion yesterday  Ordered EKG and chest x-ray.  Creatinine slightly improved 6.15  Per Dr. Najjar/nephrology, patient is to get hemodialysis today    Consultants/Specialty  Neurosurgery   Infection disease     Code Status  Full Code     Disposition  Home when medically clear    Review of Systems  Review of Systems   Constitutional: Positive for chills. Negative for fever.   HENT: Negative for ear pain and tinnitus.    Eyes: Negative for pain and discharge.   Respiratory: Negative for cough, sputum production and shortness of breath.    Cardiovascular: Negative for chest pain, palpitations and orthopnea.   Gastrointestinal: Negative for abdominal pain, nausea and vomiting.   Genitourinary: Negative for dysuria and urgency.   Musculoskeletal: Positive for back pain. Negative for joint pain, myalgias and neck pain.   Neurological: Negative for dizziness, sensory change, speech change, focal weakness and headaches.   Endo/Heme/Allergies: Negative for  environmental allergies and polydipsia. Does not bruise/bleed easily.   Psychiatric/Behavioral: Negative for substance abuse and suicidal ideas.   All other systems reviewed and are negative.       Physical Exam  Temp:  [36.2 °C (97.1 °F)-36.9 °C (98.5 °F)] 36.3 °C (97.4 °F)  Pulse:  [76-90] 76  Resp:  [16-18] 18  BP: (132-188)/(65-79) 151/68  SpO2:  [91 %-98 %] 98 %    Physical Exam  Vitals signs and nursing note reviewed.   HENT:      Head: Normocephalic and atraumatic.   Eyes:      General: No scleral icterus.        Right eye: No discharge.         Left eye: No discharge.      Conjunctiva/sclera: Conjunctivae normal.   Cardiovascular:      Rate and Rhythm: Normal rate.      Heart sounds: No murmur. No gallop.    Pulmonary:      Effort: Pulmonary effort is normal.      Breath sounds: Normal breath sounds. No stridor.   Abdominal:      General: Bowel sounds are normal. There is no distension.      Tenderness: There is no tenderness.   Musculoskeletal:      Comments: Surgical site CDI   Skin:     General: Skin is warm.      Coloration: Skin is not jaundiced.   Neurological:      Mental Status: She is alert and oriented to person, place, and time. Mental status is at baseline.   Psychiatric:         Mood and Affect: Mood normal.     I examined the patient on 1/25  No significant changes on physical exam from 1/24 noted except as documented above.    Fluids    Intake/Output Summary (Last 24 hours) at 1/25/2020 1151  Last data filed at 1/25/2020 0900  Gross per 24 hour   Intake 880 ml   Output --   Net 880 ml       Laboratory  Recent Labs     01/23/20  0408   WBC 7.6   RBC 3.67*   HEMOGLOBIN 11.1*   HEMATOCRIT 35.3*   MCV 96.2   MCH 30.2   MCHC 31.4*   RDW 44.1   PLATELETCT 279   MPV 9.5     Recent Labs     01/23/20  0408 01/24/20  0937 01/25/20  0312   SODIUM 139 139 140   POTASSIUM 4.1 4.8 4.9   CHLORIDE 104 105 105   CO2 23 25 25   GLUCOSE 192* 161* 91   BUN 34* 36* 37*   CREATININE 5.50* 6.43* 6.15*   CALCIUM 8.0*  8.5 8.2*                   Imaging  US-RENAL   Final Result      1.  Unremarkable kidneys.   2.  No hydronephrosis.   3.  Limited evaluation of bladder.      DX-CHEST-LIMITED (1 VIEW)    (Results Pending)        Assessment/Plan  * Post-operative wound abscess- (present on admission)  Assessment & Plan  Involving her spinal stimulator area.  Received course of IV  Vancomycin and Zosyn   Neurosurgery following had surgery and removal of the spine stimulator yesterday  ID also following  Appreciate rec.   On linezolid until 2/3/20 per ID.  Linezolid is chosen as a safest antibiotic in the setting of acute renal failure, despite culture positive for MSSA.  Discussed with ID Dr. Reeves    Hypoglycemia  Assessment & Plan  1/22 blood sugar 50 early in the morning.  Will reduce dose of Lantus from 25 to 15 units at night  Hypoglycemia product  1/24 reoccurs.  Secondary to renal failure and decreased renal insulin clearance.  Will reduce dose of Lantus to 5 units at night  1/25 will DC Lantus    Type 1 diabetes mellitus with neurological manifestations, uncontrolled (HCC)- (present on admission)  Assessment & Plan  Uncontrolled  Last a1c was 10.7  Cont Degludec.  Dose decreased to 15 units from 25 on 1/22 due to hypoglycemia  Continue Insulin-sliding scale, accu-checks and hypoglycemia protocol.  Monitor   1/24 we will further decrease dose of Lantus to 5 units due to hypoglycemia    Hypothyroidism, postsurgical- (present on admission)  Assessment & Plan  Resume home dose of synthroid    Nausea & vomiting  Assessment & Plan  Associated with some heartburn.  May or may not be related to azotemia  Dialysis pending.  Started on omeprazole and sucralfate    Anxiety  Assessment & Plan  Xanax as needed    Chest pain  Assessment & Plan  Appears to be noncardiogenic, exacerbated by deep breath  Ordered EKG and chest x-ray   Tylenol as needed    Hypertension  Assessment & Plan  Uncontrolled.  Was on losartan at home before  admission.  Losartan held due to renal failure  Started on amlodipine, will increase dose to 5 mg today  1/25 blood pressure is stable    RHEA (acute kidney injury) (HCC)  Assessment & Plan  Probably polyuric, but no urine output documented  Etiology unclear  Most likely ATN per nephrology  Bladder scan did not reveal retention.  Kidney ultrasound unremarkable; no hydronephrosis.  Ordered UA to look for proteinuria and sediment  We will follow with nephrology recommendations.  Avoid nephrotoxins  Uric acid and CPK not elevated.  Follow with UA, monitor input and output, monitor and correct electrolytes as needed  Linezolid is safe, no need for dose adjustment  1/25 creatinine 6.15, BUN 39.  Complaining of nausea vomiting.  Plan for hemodialysis today.  Temporary catheter will be placed    Tobacco abuse- (present on admission)  Assessment & Plan  Smoking cessation counseling provided.         VTE prophylaxis: scd

## 2020-01-26 LAB
ANION GAP SERPL CALC-SCNC: 15 MMOL/L (ref 0–11.9)
BASOPHILS # BLD AUTO: 0.6 % (ref 0–1.8)
BASOPHILS # BLD: 0.07 K/UL (ref 0–0.12)
BUN SERPL-MCNC: 22 MG/DL (ref 8–22)
CALCIUM SERPL-MCNC: 8.3 MG/DL (ref 8.5–10.5)
CHLORIDE SERPL-SCNC: 100 MMOL/L (ref 96–112)
CO2 SERPL-SCNC: 24 MMOL/L (ref 20–33)
CREAT SERPL-MCNC: 3.98 MG/DL (ref 0.5–1.4)
EKG IMPRESSION: NORMAL
EOSINOPHIL # BLD AUTO: 0.01 K/UL (ref 0–0.51)
EOSINOPHIL NFR BLD: 0.1 % (ref 0–6.9)
ERYTHROCYTE [DISTWIDTH] IN BLOOD BY AUTOMATED COUNT: 43.1 FL (ref 35.9–50)
GLUCOSE BLD-MCNC: 189 MG/DL (ref 65–99)
GLUCOSE BLD-MCNC: 261 MG/DL (ref 65–99)
GLUCOSE BLD-MCNC: 409 MG/DL (ref 65–99)
GLUCOSE SERPL-MCNC: 204 MG/DL (ref 65–99)
HCT VFR BLD AUTO: 32.8 % (ref 37–47)
HGB BLD-MCNC: 10.7 G/DL (ref 12–16)
IMM GRANULOCYTES # BLD AUTO: 0.04 K/UL (ref 0–0.11)
IMM GRANULOCYTES NFR BLD AUTO: 0.3 % (ref 0–0.9)
LYMPHOCYTES # BLD AUTO: 0.75 K/UL (ref 1–4.8)
LYMPHOCYTES NFR BLD: 6.3 % (ref 22–41)
MCH RBC QN AUTO: 30.5 PG (ref 27–33)
MCHC RBC AUTO-ENTMCNC: 32.6 G/DL (ref 33.6–35)
MCV RBC AUTO: 93.4 FL (ref 81.4–97.8)
MONOCYTES # BLD AUTO: 0.96 K/UL (ref 0–0.85)
MONOCYTES NFR BLD AUTO: 8.1 % (ref 0–13.4)
NEUTROPHILS # BLD AUTO: 10.02 K/UL (ref 2–7.15)
NEUTROPHILS NFR BLD: 84.6 % (ref 44–72)
NRBC # BLD AUTO: 0 K/UL
NRBC BLD-RTO: 0 /100 WBC
PLATELET # BLD AUTO: 357 K/UL (ref 164–446)
PMV BLD AUTO: 9.1 FL (ref 9–12.9)
POTASSIUM SERPL-SCNC: 4.6 MMOL/L (ref 3.6–5.5)
RBC # BLD AUTO: 3.51 M/UL (ref 4.2–5.4)
SODIUM SERPL-SCNC: 139 MMOL/L (ref 135–145)
WBC # BLD AUTO: 11.9 K/UL (ref 4.8–10.8)

## 2020-01-26 PROCEDURE — A9270 NON-COVERED ITEM OR SERVICE: HCPCS | Performed by: HOSPITALIST

## 2020-01-26 PROCEDURE — 700102 HCHG RX REV CODE 250 W/ 637 OVERRIDE(OP): Performed by: HOSPITALIST

## 2020-01-26 PROCEDURE — A9270 NON-COVERED ITEM OR SERVICE: HCPCS | Performed by: NEUROLOGICAL SURGERY

## 2020-01-26 PROCEDURE — 90935 HEMODIALYSIS ONE EVALUATION: CPT

## 2020-01-26 PROCEDURE — 700102 HCHG RX REV CODE 250 W/ 637 OVERRIDE(OP): Performed by: NEUROLOGICAL SURGERY

## 2020-01-26 PROCEDURE — 90935 HEMODIALYSIS ONE EVALUATION: CPT | Performed by: INTERNAL MEDICINE

## 2020-01-26 PROCEDURE — 99232 SBSQ HOSP IP/OBS MODERATE 35: CPT | Performed by: INTERNAL MEDICINE

## 2020-01-26 PROCEDURE — 5A1D70Z PERFORMANCE OF URINARY FILTRATION, INTERMITTENT, LESS THAN 6 HOURS PER DAY: ICD-10-PCS | Performed by: INTERNAL MEDICINE

## 2020-01-26 PROCEDURE — 700111 HCHG RX REV CODE 636 W/ 250 OVERRIDE (IP): Performed by: HOSPITALIST

## 2020-01-26 PROCEDURE — 82962 GLUCOSE BLOOD TEST: CPT | Mod: 91

## 2020-01-26 PROCEDURE — 80048 BASIC METABOLIC PNL TOTAL CA: CPT

## 2020-01-26 PROCEDURE — A9270 NON-COVERED ITEM OR SERVICE: HCPCS | Performed by: INTERNAL MEDICINE

## 2020-01-26 PROCEDURE — 700102 HCHG RX REV CODE 250 W/ 637 OVERRIDE(OP): Performed by: INTERNAL MEDICINE

## 2020-01-26 PROCEDURE — 770001 HCHG ROOM/CARE - MED/SURG/GYN PRIV*

## 2020-01-26 PROCEDURE — 85025 COMPLETE CBC W/AUTO DIFF WBC: CPT

## 2020-01-26 PROCEDURE — 700111 HCHG RX REV CODE 636 W/ 250 OVERRIDE (IP)

## 2020-01-26 PROCEDURE — 700111 HCHG RX REV CODE 636 W/ 250 OVERRIDE (IP): Performed by: INTERNAL MEDICINE

## 2020-01-26 PROCEDURE — 36415 COLL VENOUS BLD VENIPUNCTURE: CPT

## 2020-01-26 RX ORDER — INSULIN GLARGINE 100 [IU]/ML
8 INJECTION, SOLUTION SUBCUTANEOUS EVERY EVENING
Status: DISCONTINUED | OUTPATIENT
Start: 2020-01-26 | End: 2020-01-27

## 2020-01-26 RX ORDER — HYDRALAZINE HYDROCHLORIDE 20 MG/ML
10-20 INJECTION INTRAMUSCULAR; INTRAVENOUS EVERY 6 HOURS PRN
Status: DISCONTINUED | OUTPATIENT
Start: 2020-01-26 | End: 2020-01-30 | Stop reason: HOSPADM

## 2020-01-26 RX ORDER — HEPARIN SODIUM 1000 [USP'U]/ML
INJECTION, SOLUTION INTRAVENOUS; SUBCUTANEOUS
Status: COMPLETED
Start: 2020-01-26 | End: 2020-01-26

## 2020-01-26 RX ORDER — HEPARIN SODIUM 1000 [USP'U]/ML
2000 INJECTION, SOLUTION INTRAVENOUS; SUBCUTANEOUS
Status: DISCONTINUED | OUTPATIENT
Start: 2020-01-26 | End: 2020-01-30 | Stop reason: HOSPADM

## 2020-01-26 RX ADMIN — INSULIN GLARGINE 8 UNITS: 100 INJECTION, SOLUTION SUBCUTANEOUS at 17:36

## 2020-01-26 RX ADMIN — AMLODIPINE BESYLATE 5 MG: 5 TABLET ORAL at 06:08

## 2020-01-26 RX ADMIN — OXYCODONE HYDROCHLORIDE AND ACETAMINOPHEN 1 TABLET: 5; 325 TABLET ORAL at 09:35

## 2020-01-26 RX ADMIN — LINEZOLID 600 MG: 600 TABLET, FILM COATED ORAL at 17:35

## 2020-01-26 RX ADMIN — HEPARIN SODIUM 3800 UNITS: 1000 INJECTION, SOLUTION INTRAVENOUS; SUBCUTANEOUS at 13:35

## 2020-01-26 RX ADMIN — HEPARIN SODIUM 2000 UNITS: 1000 INJECTION, SOLUTION INTRAVENOUS; SUBCUTANEOUS at 10:32

## 2020-01-26 RX ADMIN — PROCHLORPERAZINE EDISYLATE 10 MG: 5 INJECTION INTRAMUSCULAR; INTRAVENOUS at 17:41

## 2020-01-26 RX ADMIN — ATORVASTATIN CALCIUM 10 MG: 10 TABLET, FILM COATED ORAL at 20:32

## 2020-01-26 RX ADMIN — INSULIN HUMAN 5 UNITS: 100 INJECTION, SOLUTION PARENTERAL at 17:36

## 2020-01-26 RX ADMIN — INSULIN HUMAN 9 UNITS: 100 INJECTION, SOLUTION PARENTERAL at 20:37

## 2020-01-26 RX ADMIN — LEVOTHYROXINE SODIUM 175 MCG: 150 TABLET ORAL at 06:07

## 2020-01-26 RX ADMIN — ALPRAZOLAM 0.25 MG: 0.25 TABLET ORAL at 20:32

## 2020-01-26 RX ADMIN — INSULIN HUMAN 2 UNITS: 100 INJECTION, SOLUTION PARENTERAL at 06:22

## 2020-01-26 RX ADMIN — NICOTINE TRANSDERMAL SYSTEM 21 MG: 21 PATCH, EXTENDED RELEASE TRANSDERMAL at 06:09

## 2020-01-26 RX ADMIN — OMEPRAZOLE 20 MG: 20 CAPSULE, DELAYED RELEASE ORAL at 06:07

## 2020-01-26 RX ADMIN — LINEZOLID 600 MG: 600 TABLET, FILM COATED ORAL at 06:08

## 2020-01-26 RX ADMIN — SENNOSIDES AND DOCUSATE SODIUM 2 TABLET: 8.6; 5 TABLET ORAL at 06:07

## 2020-01-26 RX ADMIN — NICOTINE POLACRILEX 2 MG: 2 GUM, CHEWING BUCCAL at 20:32

## 2020-01-26 RX ADMIN — HEPARIN SODIUM 3800 UNITS: 1000 INJECTION, SOLUTION INTRAVENOUS; SUBCUTANEOUS at 01:05

## 2020-01-26 RX ADMIN — OXYCODONE HYDROCHLORIDE AND ACETAMINOPHEN 2 TABLET: 5; 325 TABLET ORAL at 01:53

## 2020-01-26 ASSESSMENT — ENCOUNTER SYMPTOMS
HEADACHES: 0
ORTHOPNEA: 0
SENSORY CHANGE: 0
BRUISES/BLEEDS EASILY: 0
NECK PAIN: 0
EYE PAIN: 0
POLYDIPSIA: 0
MYALGIAS: 0
ABDOMINAL PAIN: 0
COUGH: 0
SHORTNESS OF BREATH: 0
NAUSEA: 0
BACK PAIN: 1
SPEECH CHANGE: 0
EYE DISCHARGE: 0
VOMITING: 0
SPUTUM PRODUCTION: 0
PALPITATIONS: 0
CHILLS: 1
DIZZINESS: 0
FEVER: 0
FOCAL WEAKNESS: 0

## 2020-01-26 ASSESSMENT — LIFESTYLE VARIABLES: SUBSTANCE_ABUSE: 0

## 2020-01-26 NOTE — PROGRESS NOTES
"1925: Patient arrived to unit in bed escorted by PACU RN. Bedside report taken. Patient ambulated to bathroom SBA. Patient is anxious and agitated as she was expecting to go to dialysis after procedure. Writer will clarify     1935: Writer spoke to Dr. Najjar to clarify if patient needs to be dialyzed tonight. Dialysis is needed. Writer to call on-call dialysis RN.     1942: Writer spoke to dialysis RN. Patient expected to be dialyzed in 1 hour. Patient informed and pleased at this time.     2125: Bedside dialysis arrived.     2200: Writer spoke to Dr Davis regarding diet order.    0130: Dialysis complete. Patient reports feeling \"so much better\".  "

## 2020-01-26 NOTE — PROGRESS NOTES
Hospital Medicine Daily Progress Note    Date of Service  1/26/2020    Chief Complaint  64 y.o. female admitted 1/18/2020 with wound infection, back pain     Hospital Course    This is a 63 y/o F with a past medical history of Insulin dependent diabetes mellitus, hypertension, recently underwent a replacement of her spinal cord stimulator on 12/16/2019. She had developed an nearby infection around the stimulator for which she was given outpatient antibiotics including Keflex, however despite taking these antibiotics patient now complains of worsening pain around that area. Pt admitted for further treatment. Neurosurgery has been consulted appreciate rec.         Interval Problem Update    Patient again nauseated and has dry heaves this morning.  Azotemia improved after dialysis yesterday.  Blood sugar is trending up.  Will start Lantus 8 units at night.  Patient is to get additional hemodialysis session today.    Consultants/Specialty  Neurosurgery   Infection disease     Code Status  Full Code     Disposition  Home when medically clear    Review of Systems  Review of Systems   Constitutional: Positive for chills. Negative for fever.   HENT: Negative for ear pain and tinnitus.    Eyes: Negative for pain and discharge.   Respiratory: Negative for cough, sputum production and shortness of breath.    Cardiovascular: Negative for chest pain, palpitations and orthopnea.   Gastrointestinal: Negative for abdominal pain, nausea and vomiting.   Genitourinary: Negative for dysuria and urgency.   Musculoskeletal: Positive for back pain. Negative for joint pain, myalgias and neck pain.   Neurological: Negative for dizziness, sensory change, speech change, focal weakness and headaches.   Endo/Heme/Allergies: Negative for environmental allergies and polydipsia. Does not bruise/bleed easily.   Psychiatric/Behavioral: Negative for substance abuse and suicidal ideas.   All other systems reviewed and are negative.       Physical  Exam  Temp:  [35.9 °C (96.7 °F)-37 °C (98.6 °F)] 35.9 °C (96.7 °F)  Pulse:  [72-95] 95  Resp:  [16-20] 16  BP: (145-193)/(62-99) 145/69  SpO2:  [93 %-100 %] 93 %    Physical Exam  Vitals signs and nursing note reviewed.   HENT:      Head: Normocephalic and atraumatic.   Eyes:      General: No scleral icterus.        Right eye: No discharge.         Left eye: No discharge.      Conjunctiva/sclera: Conjunctivae normal.   Cardiovascular:      Rate and Rhythm: Normal rate.      Heart sounds: No murmur. No gallop.    Pulmonary:      Effort: Pulmonary effort is normal.      Breath sounds: Normal breath sounds. No stridor.   Abdominal:      General: Bowel sounds are normal. There is no distension.      Tenderness: There is no tenderness.   Musculoskeletal:      Comments: Surgical site CDI   Skin:     General: Skin is warm.      Coloration: Skin is not jaundiced.   Neurological:      Mental Status: She is alert and oriented to person, place, and time. Mental status is at baseline.   Psychiatric:         Mood and Affect: Mood normal.     I examined the patient on 1/26  No significant changes on physical exam from 1/25 noted except as documented above.    Fluids    Intake/Output Summary (Last 24 hours) at 1/26/2020 1318  Last data filed at 1/26/2020 0730  Gross per 24 hour   Intake 650 ml   Output 2500 ml   Net -1850 ml       Laboratory  Recent Labs     01/26/20  0509   WBC 11.9*   RBC 3.51*   HEMOGLOBIN 10.7*   HEMATOCRIT 32.8*   MCV 93.4   MCH 30.5   MCHC 32.6*   RDW 43.1   PLATELETCT 357   MPV 9.1     Recent Labs     01/24/20  0937 01/25/20  0312 01/26/20  0509   SODIUM 139 140 139   POTASSIUM 4.8 4.9 4.6   CHLORIDE 105 105 100   CO2 25 25 24   GLUCOSE 161* 91 204*   BUN 36* 37* 22   CREATININE 6.43* 6.15* 3.98*   CALCIUM 8.5 8.2* 8.3*                   Imaging  DX-CHEST-LIMITED (1 VIEW)   Final Result      1.  No acute cardiopulmonary abnormality.      US-RENAL   Final Result      1.  Unremarkable kidneys.   2.  No  hydronephrosis.   3.  Limited evaluation of bladder.      DX-PORTABLE FLUORO > 1 HOUR    (Results Pending)        Assessment/Plan  * Post-operative wound abscess- (present on admission)  Assessment & Plan  Involving her spinal stimulator area.  Received course of IV  Vancomycin and Zosyn   Neurosurgery following had surgery and removal of the spine stimulator yesterday  ID also following  Appreciate rec.   On linezolid until 2/3/20 per ID.  Linezolid is chosen as a safest antibiotic in the setting of acute renal failure, despite culture positive for MSSA.  Discussed with ID Dr. Reeves    Hypoglycemia  Assessment & Plan  1/22 blood sugar 50 early in the morning.  Will reduce dose of Lantus from 25 to 15 units at night  Hypoglycemia product  1/24 reoccurs.  Secondary to renal failure and decreased renal insulin clearance.  Will reduce dose of Lantus to 5 units at night  1/25 will DC Lantus    Type 1 diabetes mellitus with neurological manifestations, uncontrolled (HCC)- (present on admission)  Assessment & Plan  Uncontrolled  Last a1c was 10.7  Cont Degludec.  Dose decreased to 15 units from 25 on 1/22 due to hypoglycemia  Continue Insulin-sliding scale, accu-checks and hypoglycemia protocol.  Monitor   1/24 we will further decrease dose of Lantus to 5 units due to hypoglycemia  1/25 Lantus DC'd  1/26 restart Lantus 8 units at night, as blood sugar control worsened with hemodialysis secondary to improvement of insulin clearance    Hypothyroidism, postsurgical- (present on admission)  Assessment & Plan  Resume home dose of synthroid    Nausea & vomiting  Assessment & Plan  Associated with some heartburn.  May or may not be related to azotemia  Dialysis pending.  Started on omeprazole and sucralfate    Anxiety  Assessment & Plan  Xanax as needed    Chest pain  Assessment & Plan  Appears to be noncardiogenic, exacerbated by deep breath  Ordered EKG and chest x-ray   Tylenol as needed    Hypertension  Assessment &  Plan  Uncontrolled.  Was on losartan at home before admission.  Losartan held due to renal failure  Started on amlodipine, will increase dose to 5 mg today  1/25 blood pressure is stable  Blood pressure improved    RHEA (acute kidney injury) (HCC)  Assessment & Plan  Probably polyuric, but no urine output documented  Etiology unclear  Most likely ATN per nephrology  Bladder scan did not reveal retention.  Kidney ultrasound unremarkable; no hydronephrosis.  Ordered UA to look for proteinuria and sediment  We will follow with nephrology recommendations.  Avoid nephrotoxins  Uric acid and CPK not elevated.  Follow with UA, monitor input and output, monitor and correct electrolytes as needed  Linezolid is safe, no need for dose adjustment  1/25 creatinine 6.15, BUN 39.  Complaining of nausea vomiting.  Plan for hemodialysis today.  Temporary catheter will be placed  1/26 additional hemodialysis planned today    Tobacco abuse- (present on admission)  Assessment & Plan  Smoking cessation counseling provided.         VTE prophylaxis: scd

## 2020-01-26 NOTE — PROGRESS NOTES
[facility] Progress Note    Hemodialysis treatment #1 ordered today per Dr. Najjar x 3 hours.   Treatment initiated at 2213.     Patient hypertensive at tx initiation; improved w/ fluid removal.   Blood flow rate lowered from 350 mL/min to 250-300 at times d/t pressure alarms.     See paper flow sheet for details.     Net UF 2,000 mL.     Post tx, CVC flushed with saline then locked with heparin 1000 units/mL per designated amount in each wing then clamped and capped. Aspirate heparin prior to next CVC use.    Report given to Primary RN.

## 2020-01-26 NOTE — PROGRESS NOTES
Patient AO x 4, anxious/tearful, and denies pain in PACU.  VSS on RA.  Scant sanguinous drainage from surgical site - dry at time of transfer w/o signs of active bleeding.  Haldol given for nausea.  POC reviewed, PIV verified, and safety protocols in place.  Patient transferred by this RN to S172.  Bedside report given to Maribell MORSE.  Patient assisted to commode at time of transfer.  Resting comfortably with  Nicanor at bedside.

## 2020-01-26 NOTE — PROGRESS NOTES
[facility] Progress Note    Hemodialysis treatment ordered today per Dr. Najjar x 3 hours. Treatment initiated at 1032 and ended at 1332.    Pt c/o nausea on/off and hypertensive during treatment, BP improved towards the end of Tx, Pt stable post HD; see paper flow sheets for details.    Net UF 2,800 mL    Post tx, CVC flushed with saline then locked with heparin 1000 units/mL per designated amount in each wing then clamped and capped. Aspirate heparin prior to next CVC use.    Report given to Primary RN.

## 2020-01-26 NOTE — ANESTHESIA QCDR
2019 [facility] Clinical Data Registry (for Quality Improvement)     Postoperative nausea/vomiting risk protocol (Adult = 18 yrs and Pediatric 3-17 yrs)- (430 and 463)  General inhalation anesthetic (NOT TIVA) with PONV risk factors: Yes  Provision of anti-emetic therapy with at least 2 different classes of agents: Yes   Patient DID NOT receive anti-emetic therapy and reason is documented in Medical Record:  N/A    Multimodal Pain Management- (477)  Non-emergent surgery AND patient age >= 18: Yes  Use of Multimodal Pain Management, two or more drugs and/or interventions, NOT including systemic opioids: Yes  Exception: Documented allergy to multiple classes of analgesics: N/A    Smoking Abstinence (404)  Patient is current smoker (cigarette, pipe, e-cig, marijuanna):   Elective Surgery:   Abstinence instructions provided prior to day of surgery:   Patient abstained from smoking on day of surgery:     Pre-Op Beta-Blocker in Isolated CABG (44)  Isolated CABG AND patient age >= 18:   Beta-blocker admin within 24 hours of surgical incision:   Exception:of medical reason(s) for not administering beta blocker within 24 hours prior to surgical incision (e.g., not  indicated,other medical reason):     PACU assessment of acute postoperative pain prior to Anesthesia Care End- Applies to Patients Age = 18- (ABG7)  Initial PACU pain score is which of the following: < 7/10  Patient unable to report pain score: N/A    Post-anesthetic transfer of care checklist/protocol to PACU/ICU- (426 and 427)  Upon conclusion of case, patient transferred to which of the following locations: PACU/Non-ICU  Use of transfer checklist/protocol: Yes  Exclusion: Service Performed in Patient Hospital Room (and thus did not require transfer): N/A  Unplanned admission to ICU related to anesthesia service up through end of PACU care- (MD51)  Unplanned admission to ICU (not initially anticipated at anesthesia start time): No

## 2020-01-26 NOTE — ANESTHESIA PROCEDURE NOTES
Airway  Date/Time: 1/25/2020 5:57 PM  Performed by: Antoine Blackwell M.D.  Authorized by: Antoine Blackwell M.D.     Location:  OR  Urgency:  Elective  Indications for Airway Management:  Anesthesia  Spontaneous Ventilation: absent    Sedation Level:  Deep  Preoxygenated: Yes    Mask Difficulty Assessment:  0 - not attempted  Final Airway Type:  Supraglottic airway  Final Supraglottic Airway:  Standard LMA  SGA Size:  4  Number of Attempts at Approach:  1

## 2020-01-26 NOTE — PROGRESS NOTES
Neurosurgery Progress Note    Subjective:  Pt not in room- @ dialysis    Exam:  Unable to assess  Per RN- neuro exam stable, dressing CDI, no concerns       BP  Min: 139/71  Max: 193/79  Pulse  Av.1  Min: 72  Max: 95  Resp  Av.7  Min: 16  Max: 20  Temp  Av.6 °C (97.8 °F)  Min: 35.9 °C (96.7 °F)  Max: 37 °C (98.6 °F)  SpO2  Av.7 %  Min: 93 %  Max: 100 %    No data recorded    Recent Labs     20  0509   WBC 11.9*   RBC 3.51*   HEMOGLOBIN 10.7*   HEMATOCRIT 32.8*   MCV 93.4   MCH 30.5   MCHC 32.6*   RDW 43.1   PLATELETCT 357   MPV 9.1     Recent Labs     20  0937 20  0312 20  0509   SODIUM 139 140 139   POTASSIUM 4.8 4.9 4.6   CHLORIDE 105 105 100   CO2 25 25 24   GLUCOSE 161* 91 204*   BUN 36* 37* 22   CREATININE 6.43* 6.15* 3.98*   CALCIUM 8.5 8.2* 8.3*               Intake/Output       20 07 - 20 0659 20 07 - 20 0659       Total  Total       Intake    P.O.  300  -- 300  150  -- 150    P.O. 300 -- 300 150 -- 150    Dialysis  --  500 500  500  -- 500    Dialysis Input (Dialysis Input / Output) -- 500 500 500 -- 500    Total Intake 300 500 800 650 -- 650       Output    Urine  --  -- --  --  -- --    Number of Times Voided -- 2 x 2 x -- -- --    Dialysis  --  2500 2500  3300  -- 3300    Dialysis Output (Dialysis Input / Output) -- 2500 2500 3300 -- 3300    Stool  --  -- --  --  -- --    Number of Times Stooled -- 0 x 0 x -- -- --    Total Output -- 2500 2500 3300 -- 3300       Net I/O     300 -2000 -1700 -2650 -- -2650            Intake/Output Summary (Last 24 hours) at 2020 1356  Last data filed at 2020 1353  Gross per 24 hour   Intake 1150 ml   Output 5800 ml   Net -4650 ml            • insulin glargine  8 Units Q EVENING   • hydrALAZINE  10-20 mg Q6HRS PRN   • heparin  2,000 Units DIALYSIS PRN   • omeprazole  20 mg DAILY   • amLODIPine  5 mg Q DAY   • benzocaine-menthol  1 Lozenge Q2HRS PRN   •  nicotine  21 mg Daily-0600   • HYDROmorphone  0.5 mg Q2HRS PRN   • nicotine polacrilex  2 mg Q2HRS PRN   • ALPRAZolam  0.25 mg BID PRN   • linezolid  600 mg Q12HRS   • oxyCODONE-acetaminophen  1-2 Tab Q4HRS PRN   • insulin regular  2-9 Units 4X/DAY ACHS    And   • glucose  16 g Q15 MIN PRN    And   • dextrose 10% bolus  250 mL Q15 MIN PRN   • ondansetron  4 mg Q4HRS PRN   • ondansetron  4 mg Q4HRS PRN   • prochlorperazine  5-10 mg Q4HRS PRN   • promethazine  12.5-25 mg Q4HRS PRN   • promethazine  12.5-25 mg Q4HRS PRN   • senna-docusate  2 Tab BID    And   • polyethylene glycol/lytes  1 Packet QDAY PRN    And   • magnesium hydroxide  30 mL QDAY PRN    And   • bisacodyl  10 mg QDAY PRN   • Respiratory Care per Protocol   Continuous RT   • atorvastatin  10 mg Nightly   • levothyroxine  175 mcg AM ES       Assessment and Plan:  POD #4 Removal SCS  Prophylactic anticoagulation: no         Start date/time: tbd    Microbiology: final  Thoracic: moderate gram + cocci  Right flank: Staph. Aureus       Plan:  ID following for antibiotics  Nephrology managing ATN   Okay to change thoracic and right flank dressing.   Maintain bowel protocol    Okay to DC per NSG POV. Follow up 2 weeks for suture removal. Keep incisions clean and dry. Discharge care and follow up discussed with patient at bedside.     Will follow on an intermittent basis. Call with any questions or concerns.

## 2020-01-26 NOTE — CARE PLAN
Problem: Infection  Goal: Will remain free from infection  Outcome: PROGRESSING AS EXPECTED  Intervention: Assess signs and symptoms of infection  Note:   Patient educated regarding infection control including hand hygiene, personal cares. Patient educated regarding s/s of infection including pain, fever, heat and redness at incision site. Patient encouraged to discuss any concerns with RN or care team. Patient verbalized understanding.        Problem: Pain Management  Goal: Pain level will decrease to patient's comfort goal  Outcome: PROGRESSING AS EXPECTED  Intervention: Follow pain managment plan developed in collaboration with patient and Interdisciplinary Team  Note:   Patient educated regarding pharmacological and non-pharmacological pain management. Review of PRN pain management, medication schedule, pain scale, and reassessment.

## 2020-01-26 NOTE — ANESTHESIA PREPROCEDURE EVALUATION
Relevant Problems   CARDIAC   (+) Hypertension         (+) RHEA (acute kidney injury) (HCC)      ENDO   (+) Diabetes mellitus type 1 (HCC)   (+) Hypothyroidism, postsurgical   (+) Type 1 diabetes mellitus with neurological manifestations, uncontrolled (HCC)       Physical Exam    Airway   Mallampati: II  TM distance: >3 FB  Neck ROM: full       Cardiovascular - normal exam  Rhythm: regular  Rate: normal  (-) murmur     Dental - normal exam         Pulmonary - normal exam  Breath sounds clear to auscultation     Abdominal   (+) obese     Neurological - normal exam                 Anesthesia Plan    ASA 3   ASA physical status 3 criteria: hypertension - poorly controlled, ESRD undergoing regularly scheduled dialysis and diabetes - poorly controlled    Plan - general       Airway plan will be LMA        Induction: intravenous    Postoperative Plan: Postoperative administration of opioids is intended.    Pertinent diagnostic labs and testing reviewed    Informed Consent:    Anesthetic plan and risks discussed with patient.    Use of blood products discussed with: patient whom consented to blood products.

## 2020-01-26 NOTE — ANESTHESIA POSTPROCEDURE EVALUATION
Patient: Anu Manuel    Procedure Summary     Date:  01/25/20 Room / Location:  [facility] 08 / SURGERY [facility]    Anesthesia Start:  1751 Anesthesia Stop:  1821    Procedure:  INSERTION, CATHETER PERM (Chest) Diagnosis:  (ACUTE KIDNEY INJURY)    Surgeon:  Rola Mendoza M.D. Responsible Provider:  Antoine Blackwell M.D.    Anesthesia Type:  general ASA Status:  3          Final Anesthesia Type: general  Last vitals  BP   Blood Pressure: (!) 170/75(RN aware)    Temp   36.3 °C (97.4 °F)    Pulse   Pulse: 72   Resp   16    SpO2   97 %      Anesthesia Post Evaluation    Patient location during evaluation: PACU  Patient participation: complete - patient participated  Level of consciousness: awake and alert    Airway patency: patent  Anesthetic complications: no  Cardiovascular status: hemodynamically stable  Respiratory status: acceptable  Hydration status: euvolemic    PONV: none           Nurse Pain Score: 5 (NPRS)

## 2020-01-26 NOTE — PROGRESS NOTES
Pt with sepsis, RHEA requiring HD, started HD yesterday, with no complication .  Pt is doing better today.  Seen and examined while getting HD.

## 2020-01-26 NOTE — ANESTHESIA TIME REPORT
Anesthesia Start and Stop Event Times     Date Time Event    1/25/2020 1751 Anesthesia Start     1800 Ready for Procedure     1821 Anesthesia Stop        Responsible Staff  01/25/20    Name Role Begin End    Antoine Blackwell M.D. Anesth 1751 1821        Preop Diagnosis (Free Text):  Pre-op Diagnosis     ACUTE KIDNEY INJURY        Preop Diagnosis (Codes):    Post op Diagnosis  RHEA (acute kidney injury) (HCC)      Premium Reason  B. 1st Call    Comments:

## 2020-01-26 NOTE — CONSULTS
"        Date of Service  1/25/2020    Reason For Consult  Hemodialysis access    Requesting Physician  Fadi Najjar, MD    Consulting Physician  Rola Mendoza M.D.    Primary Care Physician  Jarrell Yates M.D.    History of Present Illness  64 y.o. female who presented 1/18/2020 with wound infection after infection of a spinal cord stimulator She has suffered RHEA and now needs a tunneled dialysis catheter.  She is right handed and quite scare    Review of Systems  Review of Systems   Constitutional: Negative for chills and fever.   Gastrointestinal: Positive for nausea and vomiting.   Musculoskeletal: Positive for back pain and joint pain.   All other systems reviewed and are negative.      Past Medical History  Past Medical History:   Diagnosis Date   • Adverse effect of anesthesia     in 2008 \"throat closes up\"\"anxiety\" ?laryngospasm, kept in ICU. Pt states no problems currently 2018.    • Anesthesia     in 2008 \"throat closes up\"\"anxiety\" ?laryngospasm, kept in ICU. Pt states no problems currently 2018.    • Arthritis     right shoulder, hands   • Cigarette smoker quit 2013   • Dental disorder     missing teeth    • depression 8/30/2016    denies depression, states has anxiety and panic attacks   • Diabetes mellitus type 1 (HCC) 1989    insulin   • Encounter for long-term (current) use of insulin ([facility]) 9/25/2013   • Heart burn    • High cholesterol    • Hypertension    • Hypothyroidism, postsurgical 1970   • Indigestion    • Infectious disease      had hepatitis C, tested neg.   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.63) 6/10/2015       Surgical History  Past Surgical History:   Procedure Laterality Date   • IRRIGATION & DEBRIDEMENT NEURO  1/19/2020    Procedure: IRRIGATION AND " DEBRIDEMENT, WOUND THORACIC AND LUMBAR;  Surgeon: Ryan Roman M.D.;  Location: SURGERY [facility];  Service: Neurosurgery   • PB IMPLANT NEUROSTIM/ N/A 12/16/2019    Procedure: EXPLORATION AT THORACIC 8 - 9, REPLACEMENT OF  NEUROSTIMULATOR LEAD;  Surgeon: Ryan Roman M.D.;  Location: SURGERY [facility];  Service: Neurosurgery   • SPINAL CORD STIMULATOR N/A 10/26/2018    Procedure: SPINAL CORD STIMULATOR;  Surgeon: Ryan Roman M.D.;  Location: [facility];  Service: Neurosurgery   • THORACIC LAMINECTOMY N/A 10/26/2018    Procedure: THORACIC LAMINECTOMY - FOR;  Surgeon: Ryan Roman M.D.;  Location: [facility];  Service: Neurosurgery   • PB INJ,FORAMEN,L/S,1 LEVEL Right 8/31/2016    Procedure: INJ-FORAMEN EPI LUM/SAC SNGL L4-5;  Surgeon: Sukhi Godfrey M.D.;  Location: [facility];  Service: Pain Management   • LUMBAR LAMINECTOMY DISKECTOMY Right 5/10/2016    Procedure: LUMBAR L4-5 HEMILAMINECTOMY DISKECTOMY ;  Surgeon: Arnold Keyes M.D.;  Location: [facility];  Service:    • CERVICAL FUSION POSTERIOR  1/16/2009    Performed by TARA CONTRERAS at [facility]   • HARDWARE REMOVAL NEURO  1/16/2009    Performed by TARA CONTRERAS at [facility]   • NECK EXPLORATION  1/16/2009    Performed by TARA CONTRERAS at [facility]   • SHOULDER ARTHROSCOPY W/ ROTATOR CUFF REPAIR  10/9/08    Performed by SHERLY CASTANEDA at [facility]   • SHOULDER DECOMPRESSION ARTHROSCOPIC  6/17/08    Performed by SHERLY CASTANEDA at [facility]   • CLAVICLE DISTAL EXCISION  6/17/08    Performed by SHERLY CASTANEDA at [facility]   • CERVICAL DISK AND FUSION ANTERIOR  03/12/08   • HYSTERECTOMY, VAGINAL  2006   • APPENDECTOMY  2004   • THYROID LOBECTOMY  1973   • TONSILLECTOMY  1963   • ABDOMINAL HYSTERECTOMY TOTAL     • LUMPECTOMY  1976, 2005    Breast    • LUMPECTOMY          Family  History  Family History   Problem Relation Age of Onset   • Hypertension Mother    • Cancer Father         Social History  Social History     Socioeconomic History   • Marital status:      Spouse name: Not on file   • Number of children: Not on file   • Years of education: Not on file   • Highest education level: Not on file   Occupational History   • Not on file   Social Needs   • Financial resource strain: Not on file   • Food insecurity:     Worry: Not on file     Inability: Not on file   • Transportation needs:     Medical: Not on file     Non-medical: Not on file   Tobacco Use   • Smoking status: Current Every Day Smoker     Packs/day: 0.50     Years: 30.00     Pack years: 15.00   • Smokeless tobacco: Never Used   • Tobacco comment: 1 ppd    Substance and Sexual Activity   • Alcohol use: No     Comment:     • Drug use: No   • Sexual activity: Not on file   Lifestyle   • Physical activity:     Days per week: Not on file     Minutes per session: Not on file   • Stress: Not on file   Relationships   • Social connections:     Talks on phone: Not on file     Gets together: Not on file     Attends [employer] service: Not on file     Active member of club or organization: Not on file     Attends meetings of clubs or organizations: Not on file     Relationship status: Not on file   • Intimate partner violence:     Fear of current or ex partner: Not on file     Emotionally abused: Not on file     Physically abused: Not on file     Forced sexual activity: Not on file   Other Topics Concern   • Not on file   Social History Narrative   • Not on file       Medications  Prior to Admission Medications   Prescriptions Last Dose Informant Patient Reported? Taking?   Insulin Degludec (TRESIBA FLEXTOUCH) 200 UNIT/ML Solution Pen-injector  Patient Yes No   Sig: Inject 24 Units as instructed every bedtime.   atorvastatin (LIPITOR) 10 MG Tab  Patient Yes No   Sig: Take 10 mg by mouth every evening.   cephALEXin (KEFLEX) 500 MG  Cap  Patient Yes No   Sig: Take 1,000 mg by mouth 2 times a day. 5 day course   insulin aspart (NOVOLOG) 100 UNIT/ML Solution  Patient Yes No   Sig: Inject 2-7 Units as instructed 3 times a day before meals. Sliding Scale   levothyroxine (SYNTHROID) 175 MCG Tab  Patient Yes No   Sig: Take 175 mcg by mouth Every morning on an empty stomach.   losartan (COZAAR) 100 MG Tab  Patient Yes No   Sig: Take 100 mg by mouth every day.      Facility-Administered Medications: None       Current Facility-Administered Medications   Medication Dose Route Frequency Provider Last Rate Last Dose   • haloperidol lactate (HALDOL) injection 1 mg  1 mg Intravenous Q4HRS PRN Riley Marte M.D.       • [MAR Hold] omeprazole (PRILOSEC) capsule 20 mg  20 mg Oral DAILY Riley Marte M.D.   Stopped at 01/25/20 1215   • [MAR Hold] sucralfate (CARAFATE) tablet 1 g  1 g Oral Q6HRS Riley Marte M.D.   Stopped at 01/25/20 1215   • [MAR Hold] amLODIPine (NORVASC) tablet 5 mg  5 mg Oral Q DAY Riley Marte M.D.   5 mg at 01/25/20 0535   • [MAR Hold] benzocaine-menthol (CEPACOL) lozenge 1 Lozenge  1 Lozenge Mouth/Throat Q2HRS PRN Riley Marte M.D.   1 Lozenge at 01/25/20 0534   • diphenhydrAMINE (BENADRYL) injection 12.5 mg  12.5 mg Intravenous Q15 MIN PRN Joseph Cain M.D.       • oxyCODONE-acetaminophen (PERCOCET) 5-325 MG per tablet 1 Tab  1 Tab Oral Once PRN Joseph Cain M.D.        Or   • oxyCODONE-acetaminophen (PERCOCET) 5-325 MG per tablet 2 Tab  2 Tab Oral Once PRN Joseph Cain M.D.   Stopped at 01/25/20 0719   • meperidine (DEMEROL) injection 25 mg  25 mg Intravenous Q5 MIN PRN Joseph Cain M.D.       • labetalol (NORMODYNE/TRANDATE) injection 5 mg  5 mg Intravenous Q5 MIN PRN Joseph Cain M.D.       • albuterol (PROVENTIL) 2.5mg/0.5ml nebulizer solution 2.5 mg  2.5 mg Inhalation Q10 MIN PRN Joseph Cain M.D.       • [MAR Hold] nicotine (NICODERM) 21 MG/24HR 21 mg  21 mg Transdermal Daily-0600 Riley Marte,  M.D.   21 mg at 01/24/20 0539   • [MAR Hold] HYDROmorphone pf (DILAUDID) injection 0.5 mg  0.5 mg Intravenous Q2HRS PRN Riley Marte M.D.       • [MAR Hold] nicotine polacrilex (NICORETTE) 2 MG piece 2 mg  2 mg Oral Q2HRS PRN Nilesh Moyer M.D.   2 mg at 01/23/20 1012   • [MAR Hold] ALPRAZolam (XANAX) tablet 0.25 mg  0.25 mg Oral BID PRN Nilesh Moyer M.D.   0.25 mg at 01/24/20 1037   • [MAR Hold] linezolid (ZYVOX) tablet 600 mg  600 mg Oral Q12HRS Marianna Reeves M.D.   600 mg at 01/25/20 0535   • [MAR Hold] oxyCODONE-acetaminophen (PERCOCET) 5-325 MG per tablet 1-2 Tab  1-2 Tab Oral Q4HRS PRN Ryan Roman M.D.   2 Tab at 01/25/20 0721   • [MAR Hold] insulin regular (HUMULIN R) injection 2-9 Units  2-9 Units Subcutaneous 4X/DAY BILLY Gallegos M.D.   Stopped at 01/24/20 0700    And   • [MAR Hold] glucose 4 g chewable tablet 16 g  16 g Oral Q15 MIN PRN Elias Gallegos M.D.   16 g at 01/24/20 0554    And   • [MAR Hold] DEXTROSE 10% BOLUS 250 mL  250 mL Intravenous Q15 MIN PRN Elias Gallegos M.D.       • [MAR Hold] ondansetron (ZOFRAN ODT) dispertab 4 mg  4 mg Oral Q4HRS PRN Elias Gallegos M.D.   4 mg at 01/25/20 1034   • [MAR Hold] ondansetron (ZOFRAN) syringe/vial injection 4 mg  4 mg Intravenous Q4HRS PRN Elias Gallegos M.D.   4 mg at 01/24/20 1528   • [MAR Hold] prochlorperazine (COMPAZINE) injection 5-10 mg  5-10 mg Intravenous Q4HRS PRN Elias Gallegos M.D.   10 mg at 01/23/20 1755   • [MAR Hold] promethazine (PHENERGAN) suppository 12.5-25 mg  12.5-25 mg Rectal Q4HRS PRN Elias Gallegos M.D.       • [MAR Hold] promethazine (PHENERGAN) tablet 12.5-25 mg  12.5-25 mg Oral Q4HRS PRN Elias Gallegos M.D.   25 mg at 01/22/20 2019   • [MAR Hold] senna-docusate (PERICOLACE or SENOKOT S) 8.6-50 MG per tablet 2 Tab  2 Tab Oral BID Elias Gallegos M.D.   2 Tab at 01/24/20 1656    And   • [MAR Hold] polyethylene glycol/lytes (MIRALAX) PACKET 1 Packet  1 Packet Oral QDAY  PRN Elias Gallegos M.D.   1 Packet at 01/22/20 0457    And   • [MAR Hold] magnesium hydroxide (MILK OF MAGNESIA) suspension 30 mL  30 mL Oral QDAY PRN Elias Gallegos M.D.   30 mL at 01/22/20 0457    And   • [MAR Hold] bisacodyl (DULCOLAX) suppository 10 mg  10 mg Rectal QDAY PRN Elias Gallegos M.D.       • [MAR Hold] Respiratory Care per Protocol   Nebulization Continuous RT Elias Gallegos M.D.       • [MAR Hold] atorvastatin (LIPITOR) tablet 10 mg  10 mg Oral Nightly Elias Gallegos M.D.   10 mg at 01/24/20 2114   • [MAR Hold] levothyroxine (SYNTHROID) tablet 175 mcg  175 mcg Oral AM ES Elias Gallegos M.D.   175 mcg at 01/25/20 0534       Allergies  Allergies   Allergen Reactions   • Ativan Unspecified      Extreme Restlessness with whole body  AFS=0640   • Tape      Blisters, paper tape is ok         Physical Exam  Temp:  [36.3 °C (97.3 °F)-36.9 °C (98.5 °F)] 36.3 °C (97.3 °F)  Pulse:  [76-90] 89  Resp:  [16-18] 17  BP: (132-193)/(65-79) 193/79  SpO2:  [91 %-98 %] 97 %      General appearance: anxious, conversing appropriately  Psych: Normal affect, mood, judgement  Neuro: CN II-XII grossly intact.   Neck: full range of motion  Lungs: No inspiratory stridor or wheezing  CV: RRR; palpable radial pulses  Abdomen: Soft, NT/ND  Skin: No rashes    Labs Reviewed Today:  Recent Labs     01/23/20 0408   WBC 7.6   RBC 3.67*   HEMOGLOBIN 11.1*   HEMATOCRIT 35.3*   MCV 96.2   MCH 30.2   MCHC 31.4*   RDW 44.1   PLATELETCT 279   MPV 9.5     Recent Labs     01/23/20  0408 01/24/20  0937 01/25/20  0312   SODIUM 139 139 140   POTASSIUM 4.1 4.8 4.9   CHLORIDE 104 105 105   CO2 23 25 25   GLUCOSE 192* 161* 91   BUN 34* 36* 37*   CREATININE 5.50* 6.43* 6.15*   CALCIUM 8.0* 8.5 8.2*     Recent Labs     01/23/20  0408 01/24/20  0937 01/25/20  0312   GLUCOSE 192* 161* 91       Assessment/Plan & Medical Decision-Making    Patient is a 64 year old woman who presents with RHEA and needs hemodialysis access.  We will  hope for recovery or renal function, and not plan on fistula creation at present. Risks and benefits were explained and understood.        Rola Mendoza MD  Vascular Surgeon  [facility]  Office: [phone]

## 2020-01-27 ENCOUNTER — APPOINTMENT (OUTPATIENT)
Dept: RADIOLOGY | Facility: MEDICAL CENTER | Age: 65
DRG: 028 | End: 2020-01-27
Attending: INTERNAL MEDICINE
Payer: MEDICARE

## 2020-01-27 LAB
GLUCOSE BLD-MCNC: 213 MG/DL (ref 65–99)
GLUCOSE BLD-MCNC: 309 MG/DL (ref 65–99)
GLUCOSE BLD-MCNC: 401 MG/DL (ref 65–99)
GLUCOSE BLD-MCNC: 59 MG/DL (ref 65–99)

## 2020-01-27 PROCEDURE — 99232 SBSQ HOSP IP/OBS MODERATE 35: CPT | Performed by: INTERNAL MEDICINE

## 2020-01-27 PROCEDURE — 700102 HCHG RX REV CODE 250 W/ 637 OVERRIDE(OP): Performed by: INTERNAL MEDICINE

## 2020-01-27 PROCEDURE — 700102 HCHG RX REV CODE 250 W/ 637 OVERRIDE(OP): Performed by: HOSPITALIST

## 2020-01-27 PROCEDURE — 76705 ECHO EXAM OF ABDOMEN: CPT

## 2020-01-27 PROCEDURE — A9270 NON-COVERED ITEM OR SERVICE: HCPCS | Performed by: INTERNAL MEDICINE

## 2020-01-27 PROCEDURE — 82962 GLUCOSE BLOOD TEST: CPT | Mod: 91

## 2020-01-27 PROCEDURE — 700111 HCHG RX REV CODE 636 W/ 250 OVERRIDE (IP): Performed by: HOSPITALIST

## 2020-01-27 PROCEDURE — 770001 HCHG ROOM/CARE - MED/SURG/GYN PRIV*

## 2020-01-27 PROCEDURE — A9270 NON-COVERED ITEM OR SERVICE: HCPCS | Performed by: HOSPITALIST

## 2020-01-27 PROCEDURE — 99233 SBSQ HOSP IP/OBS HIGH 50: CPT | Performed by: INTERNAL MEDICINE

## 2020-01-27 RX ORDER — OXYCODONE HYDROCHLORIDE AND ACETAMINOPHEN 5; 325 MG/1; MG/1
1 TABLET ORAL EVERY 4 HOURS PRN
Qty: 15 TAB | Refills: 0 | Status: SHIPPED | OUTPATIENT
Start: 2020-01-27 | End: 2020-01-30

## 2020-01-27 RX ORDER — LINEZOLID 600 MG/1
600 TABLET, FILM COATED ORAL EVERY 12 HOURS
Qty: 20 TAB | Refills: 0 | Status: SHIPPED | OUTPATIENT
Start: 2020-01-27 | End: 2020-06-23

## 2020-01-27 RX ORDER — ONDANSETRON 4 MG/1
4 TABLET, ORALLY DISINTEGRATING ORAL EVERY 4 HOURS PRN
Qty: 10 TAB | Refills: 0 | Status: SHIPPED | OUTPATIENT
Start: 2020-01-27 | End: 2020-06-23

## 2020-01-27 RX ORDER — AMLODIPINE BESYLATE 5 MG/1
5 TABLET ORAL DAILY
Qty: 30 TAB | Refills: 0 | Status: SHIPPED | OUTPATIENT
Start: 2020-01-28 | End: 2020-06-23

## 2020-01-27 RX ORDER — OMEPRAZOLE 20 MG/1
20 CAPSULE, DELAYED RELEASE ORAL DAILY
Qty: 10 CAP | Refills: 0 | Status: SHIPPED | OUTPATIENT
Start: 2020-01-28 | End: 2020-06-23

## 2020-01-27 RX ORDER — INSULIN GLARGINE 100 [IU]/ML
5 INJECTION, SOLUTION SUBCUTANEOUS EVERY EVENING
Status: DISCONTINUED | OUTPATIENT
Start: 2020-01-27 | End: 2020-01-29

## 2020-01-27 RX ADMIN — NICOTINE TRANSDERMAL SYSTEM 21 MG: 21 PATCH, EXTENDED RELEASE TRANSDERMAL at 06:17

## 2020-01-27 RX ADMIN — LINEZOLID 600 MG: 600 TABLET, FILM COATED ORAL at 17:51

## 2020-01-27 RX ADMIN — ATORVASTATIN CALCIUM 10 MG: 10 TABLET, FILM COATED ORAL at 21:05

## 2020-01-27 RX ADMIN — LEVOTHYROXINE SODIUM 175 MCG: 150 TABLET ORAL at 06:17

## 2020-01-27 RX ADMIN — LINEZOLID 600 MG: 600 TABLET, FILM COATED ORAL at 06:17

## 2020-01-27 RX ADMIN — OMEPRAZOLE 20 MG: 20 CAPSULE, DELAYED RELEASE ORAL at 06:16

## 2020-01-27 RX ADMIN — PROCHLORPERAZINE EDISYLATE 10 MG: 5 INJECTION INTRAMUSCULAR; INTRAVENOUS at 04:53

## 2020-01-27 RX ADMIN — AMLODIPINE BESYLATE 5 MG: 5 TABLET ORAL at 06:16

## 2020-01-27 RX ADMIN — PROCHLORPERAZINE EDISYLATE 10 MG: 5 INJECTION INTRAMUSCULAR; INTRAVENOUS at 11:12

## 2020-01-27 RX ADMIN — INSULIN HUMAN 6 UNITS: 100 INJECTION, SOLUTION PARENTERAL at 12:44

## 2020-01-27 RX ADMIN — ONDANSETRON 4 MG: 2 INJECTION INTRAMUSCULAR; INTRAVENOUS at 19:36

## 2020-01-27 RX ADMIN — INSULIN HUMAN 9 UNITS: 100 INJECTION, SOLUTION PARENTERAL at 17:51

## 2020-01-27 RX ADMIN — INSULIN HUMAN 3 UNITS: 100 INJECTION, SOLUTION PARENTERAL at 21:10

## 2020-01-27 RX ADMIN — INSULIN GLARGINE 5 UNITS: 100 INJECTION, SOLUTION SUBCUTANEOUS at 17:51

## 2020-01-27 ASSESSMENT — ENCOUNTER SYMPTOMS
SPEECH CHANGE: 0
SHORTNESS OF BREATH: 0
BLURRED VISION: 0
SPUTUM PRODUCTION: 0
NAUSEA: 1
WEIGHT LOSS: 0
FLANK PAIN: 0
HEARTBURN: 0
NAUSEA: 0
WHEEZING: 0
NECK PAIN: 0
POLYDIPSIA: 0
ORTHOPNEA: 0
SINUS PAIN: 0
COUGH: 0
NERVOUS/ANXIOUS: 0
HALLUCINATIONS: 0
VOMITING: 0
HEMOPTYSIS: 0
HEADACHES: 0
ABDOMINAL PAIN: 0
DOUBLE VISION: 0
CHILLS: 0
DIARRHEA: 0
TREMORS: 0
FOCAL WEAKNESS: 0
PHOTOPHOBIA: 0
FEVER: 0
PALPITATIONS: 0
BRUISES/BLEEDS EASILY: 0
BACK PAIN: 1
EYES NEGATIVE: 1

## 2020-01-27 ASSESSMENT — LIFESTYLE VARIABLES: SUBSTANCE_ABUSE: 0

## 2020-01-27 NOTE — OP REPORT
OPERATIVE REPORT      Patient:  Anu Manuel     Procedure Date:  01/26/20    Indication:  RHEA in need of HD access    Pre-Operative Diagnosis:  RHEA    Post-Operative Diagnosis:  Same    Procedure:  Right IJ permacath placement    Surgeon:  ALISTAIR Mendoza MD    Anesthesia:  General endotracheal    EBL:  5cc    Specimen:  None    Complications:  None    Findings:  A 23cm right IJ permacath was placed under flouroscopic guidance.    Ok to use the catheter for HD immediately.    Procedure:  Informed consent was obtained from the patient in the pre-operative holding area. All risks, benefits, and alternatives were explained and she elected to proceed. The patient was brought to the operating room and placed on the table in a supine position. General anesthesia was induced, rachel-operative antibiotics were given, and a time-out was performed verifying the correct site of surgery. The patient was prepped and draped in the usual sterile fashion.    The skin on the anterior chest wall was anesthetized with 1% marcaine in preparation for tunneling. The right internal jugular vein was accessed using ultrasound guidance and a J-wire was advanced into the IVC under fluoroscopic visualization.    A counter incision was made on the chest wall 3cm below the clavicle and a tunneler was passed to the access site in the neck. A 23cm catheter was selected and then delivered through the tract.    The jugular vein was serially dilated with an 8 Brazilian and 12 Brazilian dilator and then a peel-away sheath was inserted over the wire under flouroscopic guidance. The dilator for the sheath and the wire were withdrawn and the catheter was advanced into position. The sheath was peeled away. Catheter tip position in the SVC was verified with flouroscopy.     The catheter was secured with nylon suture and the access site in the neck was closed with vicryl.    A biopatch and occlusive dressings were applied. The patient  was recovered from anesthesia and taken to PACU in stable condition.      Rola Mendoza MD  Vascular Surgeon  [facility]

## 2020-01-27 NOTE — PROGRESS NOTES
Hospitalist, Dr. Abbasi, notify of patient's POC glucose. Patient asymptomatic. Patient given cassia crackers, peanut butter, milk. Patient tolerating well. No orders at this time.

## 2020-01-27 NOTE — PROGRESS NOTES
Hospital Medicine Daily Progress Note    Date of Service  1/27/2020    Chief Complaint  64 y.o. female admitted 1/18/2020 with wound infection, back pain      Hospital Course    This is a 63 y/o F with a past medical history of Insulin dependent diabetes mellitus, hypertension, recently underwent a replacement of her spinal cord stimulator on 12/16/2019. She had developed an nearby infection around the stimulator for which she was given outpatient antibiotics including Keflex, however despite taking these antibiotics patient now complains of worsening pain around that area. Pt admitted for further treatment. Neurosurgery has been consulted appreciate rec.        Interval Problem Update  Patient still complaining of occasional nausea.  We will check lipase and right upper quadrant ultrasound.  I discussed patient case with nephrology Dr. Anderson; according to her today  Dialysis is on pause .  Based on tomorrow creatinine/BUN decision will be made whether patient will need to go on outpatient hemodialysis or not.  Mild episode of hypoglycemia, asymptomatic with blood sugar 50 9 in the morning.  Will reduce dose of Lantus to 5 units at night  Consultants/Specialty  Nephrology    Code Status  Full code    Disposition  Home when medically cleared    Review of Systems  Review of Systems   Constitutional: Negative for chills, fever and weight loss.   HENT: Negative for ear pain, hearing loss and tinnitus.    Eyes: Negative for blurred vision, double vision and photophobia.   Respiratory: Negative for cough, hemoptysis and sputum production.    Cardiovascular: Negative for chest pain, palpitations and orthopnea.   Gastrointestinal: Positive for nausea. Negative for heartburn and vomiting.   Genitourinary: Negative for dysuria, flank pain, frequency and hematuria.   Musculoskeletal: Positive for back pain. Negative for joint pain and neck pain.   Skin: Negative for itching and rash.   Neurological: Negative for tremors, speech  change, focal weakness and headaches.   Endo/Heme/Allergies: Negative for environmental allergies and polydipsia. Does not bruise/bleed easily.   Psychiatric/Behavioral: Negative for hallucinations and substance abuse. The patient is not nervous/anxious.         Physical Exam  Temp:  [36.3 °C (97.4 °F)-36.7 °C (98.1 °F)] 36.6 °C (97.9 °F)  Pulse:  [91-97] 91  Resp:  [14-16] 16  BP: (143-151)/(57-78) 151/68  SpO2:  [92 %-100 %] 92 %    Physical Exam  Vitals signs and nursing note reviewed.   Constitutional:       General: She is not in acute distress.     Appearance: Normal appearance.   HENT:      Head: Normocephalic and atraumatic.      Nose: Nose normal.      Mouth/Throat:      Mouth: Mucous membranes are moist.   Eyes:      Extraocular Movements: Extraocular movements intact.      Pupils: Pupils are equal, round, and reactive to light.   Neck:      Musculoskeletal: Normal range of motion and neck supple.   Cardiovascular:      Rate and Rhythm: Normal rate and regular rhythm.   Pulmonary:      Effort: Pulmonary effort is normal.      Breath sounds: Normal breath sounds.   Abdominal:      General: Abdomen is flat. There is no distension.      Tenderness: There is no tenderness.   Musculoskeletal: Normal range of motion.         General: No swelling or deformity.      Lumbar back: She exhibits tenderness.      Comments: Surgical site CDI   Skin:     General: Skin is warm and dry.   Neurological:      General: No focal deficit present.      Mental Status: She is alert and oriented to person, place, and time.   Psychiatric:         Mood and Affect: Mood normal.         Behavior: Behavior normal.         Fluids    Intake/Output Summary (Last 24 hours) at 1/27/2020 1355  Last data filed at 1/26/2020 1600  Gross per 24 hour   Intake 480 ml   Output --   Net 480 ml       Laboratory  Recent Labs     01/26/20  0509   WBC 11.9*   RBC 3.51*   HEMOGLOBIN 10.7*   HEMATOCRIT 32.8*   MCV 93.4   MCH 30.5   MCHC 32.6*   RDW 43.1    PLATELETCT 357   MPV 9.1     Recent Labs     01/25/20  0312 01/26/20  0509   SODIUM 140 139   POTASSIUM 4.9 4.6   CHLORIDE 105 100   CO2 25 24   GLUCOSE 91 204*   BUN 37* 22   CREATININE 6.15* 3.98*   CALCIUM 8.2* 8.3*                   Imaging  DX-PORTABLE FLUORO > 1 HOUR   Final Result      Portable fluoroscopy utilized for 6 seconds.         INTERPRETING LOCATION: [address] [place] [address], [place], [address]      DX-CHEST-LIMITED (1 VIEW)   Final Result      1.  No acute cardiopulmonary abnormality.      US-RENAL   Final Result      1.  Unremarkable kidneys.   2.  No hydronephrosis.   3.  Limited evaluation of bladder.      US-RUQ    (Results Pending)        Assessment/Plan  * Post-operative wound abscess- (present on admission)  Assessment & Plan  Involving her spinal stimulator area.  Received course of IV  Vancomycin and Zosyn   Neurosurgery following had surgery and removal of the spine stimulator yesterday  ID also following  Appreciate rec.   On linezolid until 2/3/20 per ID.  Linezolid is chosen as a safest antibiotic in the setting of acute renal failure, despite culture positive for MSSA.  Discussed with ID Dr. Reeves    Hypoglycemia  Assessment & Plan  1/22 blood sugar 50 early in the morning.  Will reduce dose of Lantus from 25 to 15 units at night  Hypoglycemia product  1/24 reoccurs.  Secondary to renal failure and decreased renal insulin clearance.  Will reduce dose of Lantus to 5 units at night  1/25 will DC Lantus  1/27 reduce dose of Lantus from 8 to 5 units    Type 1 diabetes mellitus with neurological manifestations, uncontrolled (HCC)- (present on admission)  Assessment & Plan  Uncontrolled  Last a1c was 10.7  Cont Degludec.  Dose decreased to 15 units from 25 on 1/22 due to hypoglycemia  Continue Insulin-sliding scale, accu-checks and hypoglycemia protocol.  Monitor   1/24 we will further decrease dose of Lantus to 5 units due to hypoglycemia  1/25 Lantus DC'd  1/26 restart Lantus 8 units at night, as  blood sugar control worsened with hemodialysis secondary to improvement of insulin clearance  1/27 reduce dose of Lantus from 8 to 5 units    Hypothyroidism, postsurgical- (present on admission)  Assessment & Plan  Resume home dose of synthroid    Nausea & vomiting  Assessment & Plan  Associated with some heartburn.  May or may not be related to azotemia  Dialysis pending.  Started on omeprazole and sucralfate.  1/27 we will check lipase and right upper quadrant ultrasound    Anxiety  Assessment & Plan  Xanax as needed    Chest pain  Assessment & Plan  Appears to be noncardiogenic, exacerbated by deep breath  Ordered EKG and chest x-ray   Tylenol as needed    Hypertension  Assessment & Plan  Uncontrolled.  Was on losartan at home before admission.  Losartan held due to renal failure  Started on amlodipine, will increase dose to 5 mg today  1/25 blood pressure is stable  Blood pressure improved    RHEA (acute kidney injury) (HCC)  Assessment & Plan  Probably polyuric, but no urine output documented  Etiology unclear  Most likely ATN per nephrology  Bladder scan did not reveal retention.  Kidney ultrasound unremarkable; no hydronephrosis.  Ordered UA to look for proteinuria and sediment  We will follow with nephrology recommendations.  Avoid nephrotoxins  Uric acid and CPK not elevated.  Follow with UA, monitor input and output, monitor and correct electrolytes as needed  Linezolid is safe, no need for dose adjustment  1/25 creatinine 6.15, BUN 39.  Complaining of nausea vomiting.  Plan for hemodialysis today.  Temporary catheter will be placed  1/26 additional hemodialysis planned today  1/27 dialysis on pause.  Repeat BMP tomorrow and decide with the patient needs to go on outpatient hemodialysis    Tobacco abuse- (present on admission)  Assessment & Plan  Smoking cessation counseling provided.         VTE prophylaxis: heparin

## 2020-01-28 LAB
ANION GAP SERPL CALC-SCNC: 11 MMOL/L (ref 0–11.9)
BASOPHILS # BLD AUTO: 0.9 % (ref 0–1.8)
BASOPHILS # BLD: 0.11 K/UL (ref 0–0.12)
BUN SERPL-MCNC: 40 MG/DL (ref 8–22)
CALCIUM SERPL-MCNC: 8.4 MG/DL (ref 8.5–10.5)
CHLORIDE SERPL-SCNC: 101 MMOL/L (ref 96–112)
CO2 SERPL-SCNC: 27 MMOL/L (ref 20–33)
CREAT SERPL-MCNC: 4.41 MG/DL (ref 0.5–1.4)
EOSINOPHIL # BLD AUTO: 0.11 K/UL (ref 0–0.51)
EOSINOPHIL NFR BLD: 0.9 % (ref 0–6.9)
ERYTHROCYTE [DISTWIDTH] IN BLOOD BY AUTOMATED COUNT: 44.4 FL (ref 35.9–50)
GLUCOSE BLD-MCNC: 284 MG/DL (ref 65–99)
GLUCOSE BLD-MCNC: 287 MG/DL (ref 65–99)
GLUCOSE BLD-MCNC: 349 MG/DL (ref 65–99)
GLUCOSE BLD-MCNC: 393 MG/DL (ref 65–99)
GLUCOSE SERPL-MCNC: 239 MG/DL (ref 65–99)
HCT VFR BLD AUTO: 33.9 % (ref 37–47)
HGB BLD-MCNC: 10.7 G/DL (ref 12–16)
HYPOCHROMIA BLD QL SMEAR: ABNORMAL
LIPASE SERPL-CCNC: 9 U/L (ref 11–82)
LYMPHOCYTES # BLD AUTO: 0.81 K/UL (ref 1–4.8)
LYMPHOCYTES NFR BLD: 6.9 % (ref 22–41)
MANUAL DIFF BLD: NORMAL
MCH RBC QN AUTO: 30 PG (ref 27–33)
MCHC RBC AUTO-ENTMCNC: 31.6 G/DL (ref 33.6–35)
MCV RBC AUTO: 95 FL (ref 81.4–97.8)
METAMYELOCYTES NFR BLD MANUAL: 0.9 %
MONOCYTES # BLD AUTO: 1.12 K/UL (ref 0–0.85)
MONOCYTES NFR BLD AUTO: 9.5 % (ref 0–13.4)
MORPHOLOGY BLD-IMP: NORMAL
NEUTROPHILS # BLD AUTO: 9.55 K/UL (ref 2–7.15)
NEUTROPHILS NFR BLD: 80.9 % (ref 44–72)
NRBC # BLD AUTO: 0 K/UL
NRBC BLD-RTO: 0 /100 WBC
PLATELET # BLD AUTO: 391 K/UL (ref 164–446)
PLATELET BLD QL SMEAR: NORMAL
PMV BLD AUTO: 8.8 FL (ref 9–12.9)
POTASSIUM SERPL-SCNC: 4.4 MMOL/L (ref 3.6–5.5)
RBC # BLD AUTO: 3.57 M/UL (ref 4.2–5.4)
RBC BLD AUTO: PRESENT
SODIUM SERPL-SCNC: 139 MMOL/L (ref 135–145)
WBC # BLD AUTO: 11.8 K/UL (ref 4.8–10.8)

## 2020-01-28 PROCEDURE — 99232 SBSQ HOSP IP/OBS MODERATE 35: CPT | Performed by: INTERNAL MEDICINE

## 2020-01-28 PROCEDURE — 700102 HCHG RX REV CODE 250 W/ 637 OVERRIDE(OP): Performed by: INTERNAL MEDICINE

## 2020-01-28 PROCEDURE — 700102 HCHG RX REV CODE 250 W/ 637 OVERRIDE(OP): Performed by: HOSPITALIST

## 2020-01-28 PROCEDURE — 86480 TB TEST CELL IMMUN MEASURE: CPT

## 2020-01-28 PROCEDURE — 80048 BASIC METABOLIC PNL TOTAL CA: CPT

## 2020-01-28 PROCEDURE — 36415 COLL VENOUS BLD VENIPUNCTURE: CPT

## 2020-01-28 PROCEDURE — 85007 BL SMEAR W/DIFF WBC COUNT: CPT

## 2020-01-28 PROCEDURE — A9270 NON-COVERED ITEM OR SERVICE: HCPCS | Performed by: HOSPITALIST

## 2020-01-28 PROCEDURE — 770001 HCHG ROOM/CARE - MED/SURG/GYN PRIV*

## 2020-01-28 PROCEDURE — 700111 HCHG RX REV CODE 636 W/ 250 OVERRIDE (IP)

## 2020-01-28 PROCEDURE — 90935 HEMODIALYSIS ONE EVALUATION: CPT

## 2020-01-28 PROCEDURE — 83690 ASSAY OF LIPASE: CPT

## 2020-01-28 PROCEDURE — 90935 HEMODIALYSIS ONE EVALUATION: CPT | Performed by: INTERNAL MEDICINE

## 2020-01-28 PROCEDURE — 700111 HCHG RX REV CODE 636 W/ 250 OVERRIDE (IP): Performed by: HOSPITALIST

## 2020-01-28 PROCEDURE — 700111 HCHG RX REV CODE 636 W/ 250 OVERRIDE (IP): Performed by: INTERNAL MEDICINE

## 2020-01-28 PROCEDURE — A9270 NON-COVERED ITEM OR SERVICE: HCPCS | Performed by: INTERNAL MEDICINE

## 2020-01-28 PROCEDURE — 85027 COMPLETE CBC AUTOMATED: CPT

## 2020-01-28 PROCEDURE — 82962 GLUCOSE BLOOD TEST: CPT | Mod: 91

## 2020-01-28 RX ORDER — HEPARIN SODIUM 1000 [USP'U]/ML
3800 INJECTION, SOLUTION INTRAVENOUS; SUBCUTANEOUS
Status: DISCONTINUED | OUTPATIENT
Start: 2020-01-28 | End: 2020-01-30 | Stop reason: HOSPADM

## 2020-01-28 RX ORDER — HEPARIN SODIUM 1000 [USP'U]/ML
INJECTION, SOLUTION INTRAVENOUS; SUBCUTANEOUS
Status: COMPLETED
Start: 2020-01-28 | End: 2020-01-28

## 2020-01-28 RX ADMIN — LINEZOLID 600 MG: 600 TABLET, FILM COATED ORAL at 04:17

## 2020-01-28 RX ADMIN — INSULIN HUMAN 5 UNITS: 100 INJECTION, SOLUTION PARENTERAL at 06:35

## 2020-01-28 RX ADMIN — AMLODIPINE BESYLATE 5 MG: 5 TABLET ORAL at 04:16

## 2020-01-28 RX ADMIN — INSULIN HUMAN 5 UNITS: 100 INJECTION, SOLUTION PARENTERAL at 17:55

## 2020-01-28 RX ADMIN — SENNOSIDES AND DOCUSATE SODIUM 2 TABLET: 8.6; 5 TABLET ORAL at 04:16

## 2020-01-28 RX ADMIN — ONDANSETRON 4 MG: 4 TABLET, ORALLY DISINTEGRATING ORAL at 04:17

## 2020-01-28 RX ADMIN — OMEPRAZOLE 20 MG: 20 CAPSULE, DELAYED RELEASE ORAL at 04:16

## 2020-01-28 RX ADMIN — HEPARIN SODIUM 2000 UNITS: 1000 INJECTION, SOLUTION INTRAVENOUS; SUBCUTANEOUS at 13:26

## 2020-01-28 RX ADMIN — SENNOSIDES AND DOCUSATE SODIUM 2 TABLET: 8.6; 5 TABLET ORAL at 17:50

## 2020-01-28 RX ADMIN — INSULIN GLARGINE 5 UNITS: 100 INJECTION, SOLUTION SUBCUTANEOUS at 17:55

## 2020-01-28 RX ADMIN — LINEZOLID 600 MG: 600 TABLET, FILM COATED ORAL at 17:50

## 2020-01-28 RX ADMIN — ATORVASTATIN CALCIUM 10 MG: 10 TABLET, FILM COATED ORAL at 20:27

## 2020-01-28 RX ADMIN — HEPARIN SODIUM 3800 UNITS: 1000 INJECTION, SOLUTION INTRAVENOUS; SUBCUTANEOUS at 16:33

## 2020-01-28 RX ADMIN — INSULIN HUMAN 8 UNITS: 100 INJECTION, SOLUTION PARENTERAL at 20:33

## 2020-01-28 RX ADMIN — INSULIN HUMAN 6 UNITS: 100 INJECTION, SOLUTION PARENTERAL at 12:17

## 2020-01-28 RX ADMIN — LEVOTHYROXINE SODIUM 175 MCG: 150 TABLET ORAL at 04:16

## 2020-01-28 RX ADMIN — NICOTINE TRANSDERMAL SYSTEM 21 MG: 21 PATCH, EXTENDED RELEASE TRANSDERMAL at 04:17

## 2020-01-28 ASSESSMENT — ENCOUNTER SYMPTOMS
FEVER: 0
NAUSEA: 0
HEARTBURN: 0
NECK PAIN: 0
WEIGHT LOSS: 0
ABDOMINAL PAIN: 0
SPUTUM PRODUCTION: 0
HALLUCINATIONS: 0
HEMOPTYSIS: 0
COUGH: 0
FOCAL WEAKNESS: 0
EYES NEGATIVE: 1
PHOTOPHOBIA: 0
DIARRHEA: 0
NERVOUS/ANXIOUS: 0
WHEEZING: 0
ORTHOPNEA: 0
BLURRED VISION: 0
BACK PAIN: 1
FLANK PAIN: 0
DOUBLE VISION: 0
SINUS PAIN: 0
CHILLS: 0
TREMORS: 0
POLYDIPSIA: 0
VOMITING: 0
HEADACHES: 0
SPEECH CHANGE: 0
BRUISES/BLEEDS EASILY: 0
SHORTNESS OF BREATH: 0
PALPITATIONS: 0
NAUSEA: 1

## 2020-01-28 ASSESSMENT — LIFESTYLE VARIABLES: SUBSTANCE_ABUSE: 0

## 2020-01-28 NOTE — PROGRESS NOTES
Nephrology Daily Progress Note    Date of Service  1/27/2020    Chief Complaint  64 y.o. female admitted 1/18/2020 with infected spinal cord stimulator, status post surgical removal.  Her hospitalization has been complicated by acute kidney injury    Interval Problem Update  Patient was very anxious/teary, went through a mild panic attack when she found out that she has to stay in the hospital for renal failure and possible dialysis.  1/25 pt is still very anxious , but agreeing to dialysis  1/27 -doing much better  No complaints  Improved UOP  Holding dialysis today -watching for recovery  Review of Systems  Review of Systems   Constitutional: Negative for chills, fever, malaise/fatigue and weight loss.   HENT: Negative for congestion, hearing loss and sinus pain.    Eyes: Negative.    Respiratory: Negative for cough, hemoptysis, shortness of breath and wheezing.    Cardiovascular: Negative for chest pain, palpitations, orthopnea and leg swelling.   Gastrointestinal: Negative for abdominal pain, diarrhea, nausea and vomiting.   Genitourinary: Negative for dysuria, flank pain, frequency, hematuria and urgency.   Skin: Negative.    All other systems reviewed and are negative.       Physical Exam  Temp:  [36.6 °C (97.9 °F)-36.7 °C (98.1 °F)] 36.6 °C (97.9 °F)  Pulse:  [91-93] 91  Resp:  [14-16] 16  BP: (143-151)/(57-78) 151/68  SpO2:  [92 %-96 %] 92 %    Physical Exam  Constitutional:       General: She is not in acute distress.     Appearance: Normal appearance. She is well-developed. She is not diaphoretic.   HENT:      Head: Normocephalic and atraumatic.      Nose: Nose normal.      Mouth/Throat:      Mouth: Mucous membranes are moist.      Pharynx: Oropharynx is clear.   Eyes:      Conjunctiva/sclera: Conjunctivae normal.      Pupils: Pupils are equal, round, and reactive to light.   Cardiovascular:      Rate and Rhythm: Normal rate and regular rhythm.   Pulmonary:      Effort: Pulmonary effort is normal. No  respiratory distress.      Breath sounds: Normal breath sounds. No wheezing, rhonchi or rales.   Abdominal:      General: Bowel sounds are normal. There is no distension.      Palpations: Abdomen is soft. There is no mass.      Tenderness: There is no tenderness. There is no right CVA tenderness, left CVA tenderness or guarding.   Musculoskeletal:         General: No swelling.   Skin:     General: Skin is warm.      Coloration: Skin is not jaundiced.      Findings: No erythema or rash.   Neurological:      General: No focal deficit present.      Mental Status: She is alert and oriented to person, place, and time.      Cranial Nerves: No cranial nerve deficit.      Coordination: Coordination normal.         Fluids  No intake or output data in the 24 hours ending 01/27/20 1636    Laboratory  Recent Labs     01/26/20  0509   WBC 11.9*   RBC 3.51*   HEMOGLOBIN 10.7*   HEMATOCRIT 32.8*   MCV 93.4   MCH 30.5   MCHC 32.6*   RDW 43.1   PLATELETCT 357   MPV 9.1     Recent Labs     01/25/20  0312 01/26/20  0509   SODIUM 140 139   POTASSIUM 4.9 4.6   CHLORIDE 105 100   CO2 25 24   GLUCOSE 91 204*   BUN 37* 22   CREATININE 6.15* 3.98*   CALCIUM 8.2* 8.3*         No results for input(s): NTPROBNP in the last 72 hours.        Imaging  DX-PORTABLE FLUORO > 1 HOUR   Final Result      Portable fluoroscopy utilized for 6 seconds.         INTERPRETING LOCATION: [address] [place] [address], [id number]      DX-CHEST-LIMITED (1 VIEW)   Final Result      1.  No acute cardiopulmonary abnormality.      US-RENAL   Final Result      1.  Unremarkable kidneys.   2.  No hydronephrosis.   3.  Limited evaluation of bladder.      US-RUQ    (Results Pending)         Assessment/Plan  1 RHEA/ATN -better UOP -holding dialysis today  2 sepsis -clinically much better  3 anemia: Hb slightly worse -to monitor closely  4 Electrolytes well controlled  5 HTN: elevated BP under better control    Recs: no need for HD today -will reassess AM if recovering  Daily labs  Renal  dose all meds  Avoid nephrotoxins  Prognosis guarded.  Discussed with Dr. Marte  Will follow

## 2020-01-28 NOTE — PROGRESS NOTES
Hospital Medicine Daily Progress Note    Date of Service  1/28/2020    Chief Complaint  64 y.o. female admitted 1/18/2020 with wound infection, back pain      Hospital Course    This is a 63 y/o F with a past medical history of Insulin dependent diabetes mellitus, hypertension, recently underwent a replacement of her spinal cord stimulator on 12/16/2019. She had developed an nearby infection around the stimulator for which she was given outpatient antibiotics including Keflex, however despite taking these antibiotics patient now complains of worsening pain around that area. Pt admitted for further treatment. Neurosurgery has been consulted appreciate rec.        Interval Problem Update  Patient seen and examined, afebrile no overnight events, nausea has improved, nephrology following regarding dialysis appreciate rec.     Consultants/Specialty  Nephrology  Neurosurgery     Code Status  Full code    Disposition  Home when medically cleared    Review of Systems  Review of Systems   Constitutional: Negative for chills, fever and weight loss.   HENT: Negative for ear pain, hearing loss and tinnitus.    Eyes: Negative for blurred vision, double vision and photophobia.   Respiratory: Negative for cough, hemoptysis and sputum production.    Cardiovascular: Negative for chest pain, palpitations and orthopnea.   Gastrointestinal: Positive for nausea. Negative for heartburn and vomiting.   Genitourinary: Negative for dysuria, flank pain, frequency and hematuria.   Musculoskeletal: Positive for back pain. Negative for joint pain and neck pain.   Skin: Negative for itching and rash.   Neurological: Negative for tremors, speech change, focal weakness and headaches.   Endo/Heme/Allergies: Negative for environmental allergies and polydipsia. Does not bruise/bleed easily.   Psychiatric/Behavioral: Negative for hallucinations and substance abuse. The patient is not nervous/anxious.         Physical Exam  Temp:  [36.4 °C (97.6 °F)-37  °C (98.6 °F)] 36.6 °C (97.9 °F)  Pulse:  [81-93] 87  Resp:  [16-17] 17  BP: (137-143)/(72-95) 137/72  SpO2:  [96 %-97 %] 97 %    Physical Exam  Vitals signs and nursing note reviewed.   Constitutional:       General: She is not in acute distress.     Appearance: Normal appearance.   HENT:      Head: Normocephalic and atraumatic.      Nose: Nose normal.      Mouth/Throat:      Mouth: Mucous membranes are moist.   Eyes:      Extraocular Movements: Extraocular movements intact.      Pupils: Pupils are equal, round, and reactive to light.   Neck:      Musculoskeletal: Normal range of motion and neck supple.   Cardiovascular:      Rate and Rhythm: Normal rate and regular rhythm.   Pulmonary:      Effort: Pulmonary effort is normal.      Breath sounds: Normal breath sounds.   Abdominal:      General: Abdomen is flat. There is no distension.      Tenderness: There is no tenderness.   Musculoskeletal: Normal range of motion.         General: No swelling or deformity.      Lumbar back: She exhibits tenderness.      Comments: Surgical site CDI   Skin:     General: Skin is warm and dry.   Neurological:      General: No focal deficit present.      Mental Status: She is alert and oriented to person, place, and time.   Psychiatric:         Mood and Affect: Mood normal.         Behavior: Behavior normal.         Fluids    Intake/Output Summary (Last 24 hours) at 1/28/2020 1452  Last data filed at 1/27/2020 1939  Gross per 24 hour   Intake --   Output 300 ml   Net -300 ml       Laboratory  Recent Labs     01/26/20  0509 01/28/20  0409   WBC 11.9* 11.8*   RBC 3.51* 3.57*   HEMOGLOBIN 10.7* 10.7*   HEMATOCRIT 32.8* 33.9*   MCV 93.4 95.0   MCH 30.5 30.0   MCHC 32.6* 31.6*   RDW 43.1 44.4   PLATELETCT 357 391   MPV 9.1 8.8*     Recent Labs     01/26/20  0509 01/28/20  0409   SODIUM 139 139   POTASSIUM 4.6 4.4   CHLORIDE 100 101   CO2 24 27   GLUCOSE 204* 239*   BUN 22 40*   CREATININE 3.98* 4.41*   CALCIUM 8.3* 8.4*                    Imaging  US-RUQ   Final Result      Sludge within the gallbladder. No gallstones or biliary ductal dilatation.      DX-PORTABLE FLUORO > 1 HOUR   Final Result      Portable fluoroscopy utilized for 6 seconds.         INTERPRETING LOCATION: [address] [facility], [place], [address]      DX-CHEST-LIMITED (1 VIEW)   Final Result      1.  No acute cardiopulmonary abnormality.      US-RENAL   Final Result      1.  Unremarkable kidneys.   2.  No hydronephrosis.   3.  Limited evaluation of bladder.           Assessment/Plan  * Post-operative wound abscess- (present on admission)  Assessment & Plan  Involving her spinal stimulator area.  Received course of IV  Vancomycin and Zosyn   Neurosurgery following had surgery and removal of the spine stimulator yesterday  ID also following  Appreciate rec.   On linezolid until 2/3/20 per ID.  Linezolid is chosen as a safest antibiotic in the setting of acute renal failure, despite culture positive for MSSA.  Discussed with ID Dr. Reeves    RHEA (acute kidney injury) (HCC)  Assessment & Plan  Probably polyuric, but no urine output documented  Etiology unclear  Most likely ATN per nephrology  Bladder scan did not reveal retention.  Kidney ultrasound unremarkable; no hydronephrosis.  Ordered UA to look for proteinuria and sediment  We will follow with nephrology recommendations.  Avoid nephrotoxins  Uric acid and CPK not elevated.  Follow with UA, monitor input and output, monitor and correct electrolytes as needed  Linezolid is safe, no need for dose adjustment  1/25 creatinine 6.15, BUN 39.  Complaining of nausea vomiting.  Plan for hemodialysis today.  Temporary catheter will be placed  1/26 additional hemodialysis planned today  1/27 dialysis on pause.  Repeat BMP tomorrow and decide with the patient needs to go on outpatient hemodialysis    Nephrology following regarding her dialysis appreciate rec.     Hypothyroidism, postsurgical- (present on admission)  Assessment & Plan  Resume home  dose of synthroid    Hypoglycemia  Assessment & Plan  1/22 blood sugar 50 early in the morning.  Will reduce dose of Lantus from 25 to 15 units at night  Hypoglycemia product  1/24 reoccurs.  Secondary to renal failure and decreased renal insulin clearance.  Will reduce dose of Lantus to 5 units at night  1/25 will DC Lantus  1/27 reduce dose of Lantus from 8 to 5 units    Type 1 diabetes mellitus with neurological manifestations, uncontrolled (HCC)- (present on admission)  Assessment & Plan  Uncontrolled  Last a1c was 10.7  Cont Degludec.  Dose decreased to 15 units from 25 on 1/22 due to hypoglycemia  Continue Insulin-sliding scale, accu-checks and hypoglycemia protocol.  Monitor   1/24 we will further decrease dose of Lantus to 5 units due to hypoglycemia  1/25 Lantus DC'd  1/26 restart Lantus 8 units at night, as blood sugar control worsened with hemodialysis secondary to improvement of insulin clearance  1/27 reduce dose of Lantus from 8 to 5 units    Nausea & vomiting  Assessment & Plan  Associated with some heartburn.  May or may not be related to azotemia  Dialysis pending.  Started on omeprazole and sucralfate.  1/27 we will check lipase and right upper quadrant ultrasound    Anxiety  Assessment & Plan  Xanax as needed    Chest pain  Assessment & Plan  Appears to be noncardiogenic, exacerbated by deep breath  Ordered EKG and chest x-ray   Tylenol as needed    Hypertension  Assessment & Plan  Uncontrolled.  Was on losartan at home before admission.  Losartan held due to renal failure  Started on amlodipine, will increase dose to 5 mg today  1/25 blood pressure is stable  Blood pressure improved    Tobacco abuse- (present on admission)  Assessment & Plan  Smoking cessation counseling provided.         VTE prophylaxis: heparin

## 2020-01-28 NOTE — PROGRESS NOTES
Neurosurgery Progress Note    Subjective:  No acute events. Sleeping, rouses easily to voice. Wants dressings over thoracic and right flank incision removed. Wants to know when she can go home.     Exam:  A&O x3. Fluent Speech.   TAO fs  Sensation: Intact throughout.   Incision: dressings with old blood. Removed  Thoracic and right flank incision healing well with staples intact. No redness, drainage, swelling.         BP  Min: 137/72  Max: 143/95  Pulse  Av  Min: 81  Max: 93  Resp  Av.7  Min: 16  Max: 17  Temp  Av.7 °C (98 °F)  Min: 36.4 °C (97.6 °F)  Max: 37 °C (98.6 °F)  SpO2  Av.3 %  Min: 96 %  Max: 97 %    No data recorded    Recent Labs     20  0509 20  0409   WBC 11.9* 11.8*   RBC 3.51* 3.57*   HEMOGLOBIN 10.7* 10.7*   HEMATOCRIT 32.8* 33.9*   MCV 93.4 95.0   MCH 30.5 30.0   MCHC 32.6* 31.6*   RDW 43.1 44.4   PLATELETCT 357 391   MPV 9.1 8.8*     Recent Labs     20  0509 20  0409   SODIUM 139 139   POTASSIUM 4.6 4.4   CHLORIDE 100 101   CO2 24 27   GLUCOSE 204* 239*   BUN 22 40*   CREATININE 3.98* 4.41*   CALCIUM 8.3* 8.4*               Intake/Output       20 - 20 0659 20 - 20 0659       Total  Total       Intake    Total Intake -- -- -- -- -- --       Output    Urine  --  300 300  --  -- --    Urine Void (mL) -- 300 300 -- -- --    Total Output -- 300 300 -- -- --       Net I/O     -- -300 -300 -- -- --            Intake/Output Summary (Last 24 hours) at 2020 0821  Last data filed at 2020 1939  Gross per 24 hour   Intake --   Output 300 ml   Net -300 ml            • insulin glargine  5 Units Q EVENING   • hydrALAZINE  10-20 mg Q6HRS PRN   • heparin  2,000 Units DIALYSIS PRN   • omeprazole  20 mg DAILY   • amLODIPine  5 mg Q DAY   • benzocaine-menthol  1 Lozenge Q2HRS PRN   • nicotine  21 mg Daily-0600   • HYDROmorphone  0.5 mg Q2HRS PRN   • nicotine polacrilex  2 mg Q2HRS PRN   •  ALPRAZolam  0.25 mg BID PRN   • linezolid  600 mg Q12HRS   • oxyCODONE-acetaminophen  1-2 Tab Q4HRS PRN   • insulin regular  2-9 Units 4X/DAY ACHS    And   • glucose  16 g Q15 MIN PRN    And   • dextrose 10% bolus  250 mL Q15 MIN PRN   • ondansetron  4 mg Q4HRS PRN   • ondansetron  4 mg Q4HRS PRN   • prochlorperazine  5-10 mg Q4HRS PRN   • promethazine  12.5-25 mg Q4HRS PRN   • promethazine  12.5-25 mg Q4HRS PRN   • senna-docusate  2 Tab BID    And   • polyethylene glycol/lytes  1 Packet QDAY PRN    And   • magnesium hydroxide  30 mL QDAY PRN    And   • bisacodyl  10 mg QDAY PRN   • Respiratory Care per Protocol   Continuous RT   • atorvastatin  10 mg Nightly   • levothyroxine  175 mcg AM ES       Assessment and Plan:  POD #9 Removal SCS  Prophylactic anticoagulation: no         Start date/time: tbd    Microbiology: final  Thoracic: moderate gram + cocci  Right flank: Staph. Aureus       Plan:  ID following for antibiotics  Nephrology managing ATN   Keep incisions open to air.   Pt can shower, soap/water over incisions and pat dry. To be kept clean and dry.     Okay to DC per NSG POV. Follow up 2 weeks post op for suture removal.   Keep incisions clean and dry.  Discharge care and follow up discussed with patient at bedside.     Will sign off. Please call with any questions.

## 2020-01-28 NOTE — CARE PLAN
Problem: Communication  Goal: The ability to communicate needs accurately and effectively will improve  Outcome: PROGRESSING AS EXPECTED  Pt communicates needs effectively.   Call light w/in reach. Hourly rounding in place.       Problem: Pain Management  Goal: Pain level will decrease to patient's comfort goal  Outcome: PROGRESSING AS EXPECTED  Pt denies pain at this time. Will continue to monitor.

## 2020-01-28 NOTE — PROGRESS NOTES
Nephrology Daily Progress Note    Date of Service  1/28/2020    Chief Complaint  64 y.o. female admitted 1/18/2020 with infected spinal cord stimulator, status post surgical removal.  Her hospitalization has been complicated by acute kidney injury    Interval Problem Update  Patient was very anxious/teary, went through a mild panic attack when she found out that she has to stay in the hospital for renal failure and possible dialysis.  1/25 pt is still very anxious , but agreeing to dialysis  1/27 -doing much better  No complaints  Improved UOP  Holding dialysis today -watching for recovery  1/28 -doing well, no complaints  BUN and creat level worse -continue dialysis  Seen and examined during dialysis treatment -tolerates well    Review of Systems  Review of Systems   Constitutional: Negative for chills, fever, malaise/fatigue and weight loss.   HENT: Negative for congestion, hearing loss and sinus pain.    Eyes: Negative.    Respiratory: Negative for cough, hemoptysis, shortness of breath and wheezing.    Cardiovascular: Negative for chest pain, palpitations, orthopnea and leg swelling.   Gastrointestinal: Negative for abdominal pain, diarrhea, nausea and vomiting.   Genitourinary: Negative for dysuria, flank pain, frequency, hematuria and urgency.   Skin: Negative.    All other systems reviewed and are negative.       Physical Exam  Temp:  [36.4 °C (97.6 °F)-37 °C (98.6 °F)] 36.6 °C (97.9 °F)  Pulse:  [81-93] 87  Resp:  [16-17] 17  BP: (137-143)/(72-95) 137/72  SpO2:  [96 %-97 %] 97 %    Physical Exam  Vitals signs and nursing note reviewed.   Constitutional:       General: She is not in acute distress.     Appearance: Normal appearance. She is well-developed. She is not diaphoretic.   HENT:      Head: Normocephalic and atraumatic.      Nose: Nose normal.      Mouth/Throat:      Mouth: Mucous membranes are moist.      Pharynx: Oropharynx is clear.   Eyes:      Extraocular Movements: Extraocular movements intact.       Conjunctiva/sclera: Conjunctivae normal.      Pupils: Pupils are equal, round, and reactive to light.   Neck:      Musculoskeletal: Normal range of motion and neck supple.   Cardiovascular:      Rate and Rhythm: Normal rate and regular rhythm.      Pulses: Normal pulses.      Heart sounds: Normal heart sounds.   Pulmonary:      Effort: Pulmonary effort is normal. No respiratory distress.      Breath sounds: Normal breath sounds. No wheezing, rhonchi or rales.   Abdominal:      General: Abdomen is flat. Bowel sounds are normal. There is no distension.      Palpations: Abdomen is soft.      Tenderness: There is no tenderness. There is no right CVA tenderness, left CVA tenderness or guarding.   Musculoskeletal:         General: No swelling.   Skin:     General: Skin is warm.      Coloration: Skin is not jaundiced.      Findings: No erythema or rash.   Neurological:      Mental Status: She is alert and oriented to person, place, and time.      Cranial Nerves: No cranial nerve deficit.      Coordination: Coordination normal.         Fluids    Intake/Output Summary (Last 24 hours) at 1/28/2020 1503  Last data filed at 1/27/2020 1939  Gross per 24 hour   Intake --   Output 300 ml   Net -300 ml       Laboratory  Recent Labs     01/26/20  0509 01/28/20  0409   WBC 11.9* 11.8*   RBC 3.51* 3.57*   HEMOGLOBIN 10.7* 10.7*   HEMATOCRIT 32.8* 33.9*   MCV 93.4 95.0   MCH 30.5 30.0   MCHC 32.6* 31.6*   RDW 43.1 44.4   PLATELETCT 357 391   MPV 9.1 8.8*     Recent Labs     01/26/20  0509 01/28/20  0409   SODIUM 139 139   POTASSIUM 4.6 4.4   CHLORIDE 100 101   CO2 24 27   GLUCOSE 204* 239*   BUN 22 40*   CREATININE 3.98* 4.41*   CALCIUM 8.3* 8.4*         No results for input(s): NTPROBNP in the last 72 hours.        Imaging  US-RUQ   Final Result      Sludge within the gallbladder. No gallstones or biliary ductal dilatation.      DX-PORTABLE FLUORO > 1 HOUR   Final Result      Portable fluoroscopy utilized for 6 seconds.          INTERPRETING LOCATION: [address] [place] [address], [place], [address]      DX-CHEST-LIMITED (1 VIEW)   Final Result      1.  No acute cardiopulmonary abnormality.      US-RENAL   Final Result      1.  Unremarkable kidneys.   2.  No hydronephrosis.   3.  Limited evaluation of bladder.            Assessment/Plan  1 RHEA/ATN - HD TTS -no recovery yet -please see dialysis flow sheet for details  2 sepsis -recovered  3 anemia: Hb stable -to monitor closely  4 Electrolytes well controlled  5 HTN: elevated BP well controlled    Recs: continue HD TTS  Daily labs  Renal dose all meds  Avoid nephrotoxins  Needs outpt dialysis chair  Will follow

## 2020-01-28 NOTE — DOCUMENTATION QUERY
"                                                                         Formerly [facility], [facility]                                                                       Query Response Note      PATIENT:               KALPANA BUTTS  ACCT #:                  [id number]  MRN:                     [id number]  :                      1955  ADMIT DATE:       2020 7:40 PM  DISCH DATE:          RESPONDING  PROVIDER #:        [id number]           QUERY TEXT:    It is unclear whether Sepsis was present on admission. Please clarify the POA status.    The patient's Clinical Indicators include:  2/4 SIRS Criteria POA- HR: 101 and WBC: 14.0. CRP: 14.87 on 20. \"ATN 2/2 Sepsis\" is documented in the Nephrology Consult on 20. Patient found to have infected spinal cord stimulator.    Treatments include: Spinal Cord Stimulator Removal, Infectious Disease Consult, Vancomycin, Maxipime, and Zosyn.    Risk factors include: dx Infected Spinal Cord Stimulator, dx RHEA w/ATN, and dx Sepsis.  Options provided:   -- Sepsis was present on admission   -- Sepsis was not present on admission   -- Unable to determine      Query created by: Michael Cueva on 2020 7:38 AM    RESPONSE TEXT:    Sepsis was not present on admission          Electronically signed by:  SNOW SALCEDO MD 2020 1:33 PM              "

## 2020-01-29 LAB
ANION GAP SERPL CALC-SCNC: 11 MMOL/L (ref 0–11.9)
BUN SERPL-MCNC: 31 MG/DL (ref 8–22)
CALCIUM SERPL-MCNC: 8.2 MG/DL (ref 8.5–10.5)
CHLORIDE SERPL-SCNC: 98 MMOL/L (ref 96–112)
CO2 SERPL-SCNC: 26 MMOL/L (ref 20–33)
CREAT SERPL-MCNC: 3.35 MG/DL (ref 0.5–1.4)
ERYTHROCYTE [DISTWIDTH] IN BLOOD BY AUTOMATED COUNT: 45.9 FL (ref 35.9–50)
GLUCOSE BLD-MCNC: 181 MG/DL (ref 65–99)
GLUCOSE BLD-MCNC: 200 MG/DL (ref 65–99)
GLUCOSE BLD-MCNC: 310 MG/DL (ref 65–99)
GLUCOSE BLD-MCNC: 415 MG/DL (ref 65–99)
GLUCOSE SERPL-MCNC: 495 MG/DL (ref 65–99)
HCT VFR BLD AUTO: 36.7 % (ref 37–47)
HGB BLD-MCNC: 11.2 G/DL (ref 12–16)
MCH RBC QN AUTO: 29.8 PG (ref 27–33)
MCHC RBC AUTO-ENTMCNC: 30.5 G/DL (ref 33.6–35)
MCV RBC AUTO: 97.6 FL (ref 81.4–97.8)
PLATELET # BLD AUTO: 389 K/UL (ref 164–446)
PMV BLD AUTO: 8.8 FL (ref 9–12.9)
POTASSIUM SERPL-SCNC: 4.8 MMOL/L (ref 3.6–5.5)
RBC # BLD AUTO: 3.76 M/UL (ref 4.2–5.4)
SODIUM SERPL-SCNC: 135 MMOL/L (ref 135–145)
WBC # BLD AUTO: 11.1 K/UL (ref 4.8–10.8)

## 2020-01-29 PROCEDURE — 85027 COMPLETE CBC AUTOMATED: CPT

## 2020-01-29 PROCEDURE — 99233 SBSQ HOSP IP/OBS HIGH 50: CPT | Performed by: INTERNAL MEDICINE

## 2020-01-29 PROCEDURE — 700111 HCHG RX REV CODE 636 W/ 250 OVERRIDE (IP): Performed by: HOSPITALIST

## 2020-01-29 PROCEDURE — 700102 HCHG RX REV CODE 250 W/ 637 OVERRIDE(OP): Performed by: INTERNAL MEDICINE

## 2020-01-29 PROCEDURE — 80048 BASIC METABOLIC PNL TOTAL CA: CPT

## 2020-01-29 PROCEDURE — 770001 HCHG ROOM/CARE - MED/SURG/GYN PRIV*

## 2020-01-29 PROCEDURE — A9270 NON-COVERED ITEM OR SERVICE: HCPCS | Performed by: NEUROLOGICAL SURGERY

## 2020-01-29 PROCEDURE — 700102 HCHG RX REV CODE 250 W/ 637 OVERRIDE(OP): Performed by: NEUROLOGICAL SURGERY

## 2020-01-29 PROCEDURE — A9270 NON-COVERED ITEM OR SERVICE: HCPCS | Performed by: INTERNAL MEDICINE

## 2020-01-29 PROCEDURE — 99232 SBSQ HOSP IP/OBS MODERATE 35: CPT | Performed by: INTERNAL MEDICINE

## 2020-01-29 PROCEDURE — A9270 NON-COVERED ITEM OR SERVICE: HCPCS | Performed by: HOSPITALIST

## 2020-01-29 PROCEDURE — 82962 GLUCOSE BLOOD TEST: CPT

## 2020-01-29 PROCEDURE — 36415 COLL VENOUS BLD VENIPUNCTURE: CPT

## 2020-01-29 PROCEDURE — 700102 HCHG RX REV CODE 250 W/ 637 OVERRIDE(OP): Performed by: HOSPITALIST

## 2020-01-29 RX ORDER — INSULIN GLARGINE 100 [IU]/ML
10 INJECTION, SOLUTION SUBCUTANEOUS EVERY EVENING
Status: DISCONTINUED | OUTPATIENT
Start: 2020-01-29 | End: 2020-01-30 | Stop reason: HOSPADM

## 2020-01-29 RX ADMIN — INSULIN HUMAN 9 UNITS: 100 INJECTION, SOLUTION PARENTERAL at 05:47

## 2020-01-29 RX ADMIN — INSULIN HUMAN 6 UNITS: 100 INJECTION, SOLUTION PARENTERAL at 13:04

## 2020-01-29 RX ADMIN — NICOTINE TRANSDERMAL SYSTEM 21 MG: 21 PATCH, EXTENDED RELEASE TRANSDERMAL at 04:32

## 2020-01-29 RX ADMIN — INSULIN HUMAN 2 UNITS: 100 INJECTION, SOLUTION PARENTERAL at 18:20

## 2020-01-29 RX ADMIN — ONDANSETRON 4 MG: 4 TABLET, ORALLY DISINTEGRATING ORAL at 10:58

## 2020-01-29 RX ADMIN — AMLODIPINE BESYLATE 5 MG: 5 TABLET ORAL at 04:32

## 2020-01-29 RX ADMIN — LINEZOLID 600 MG: 600 TABLET, FILM COATED ORAL at 18:15

## 2020-01-29 RX ADMIN — INSULIN GLARGINE 10 UNITS: 100 INJECTION, SOLUTION SUBCUTANEOUS at 18:20

## 2020-01-29 RX ADMIN — OXYCODONE HYDROCHLORIDE AND ACETAMINOPHEN 2 TABLET: 5; 325 TABLET ORAL at 13:59

## 2020-01-29 RX ADMIN — BENZOCAINE AND MENTHOL, UNSPECIFIED FORM 1 LOZENGE: 15; 3.6 LOZENGE ORAL at 18:18

## 2020-01-29 RX ADMIN — LEVOTHYROXINE SODIUM 175 MCG: 150 TABLET ORAL at 04:31

## 2020-01-29 RX ADMIN — ONDANSETRON 4 MG: 2 INJECTION INTRAMUSCULAR; INTRAVENOUS at 19:23

## 2020-01-29 RX ADMIN — PROCHLORPERAZINE EDISYLATE 10 MG: 5 INJECTION INTRAMUSCULAR; INTRAVENOUS at 21:12

## 2020-01-29 RX ADMIN — LINEZOLID 600 MG: 600 TABLET, FILM COATED ORAL at 04:31

## 2020-01-29 RX ADMIN — SENNOSIDES AND DOCUSATE SODIUM 2 TABLET: 8.6; 5 TABLET ORAL at 18:16

## 2020-01-29 RX ADMIN — OXYCODONE HYDROCHLORIDE AND ACETAMINOPHEN 2 TABLET: 5; 325 TABLET ORAL at 18:16

## 2020-01-29 RX ADMIN — SENNOSIDES AND DOCUSATE SODIUM 2 TABLET: 8.6; 5 TABLET ORAL at 04:31

## 2020-01-29 RX ADMIN — OMEPRAZOLE 20 MG: 20 CAPSULE, DELAYED RELEASE ORAL at 04:32

## 2020-01-29 RX ADMIN — INSULIN HUMAN 2 UNITS: 100 INJECTION, SOLUTION PARENTERAL at 21:28

## 2020-01-29 RX ADMIN — OXYCODONE HYDROCHLORIDE AND ACETAMINOPHEN 2 TABLET: 5; 325 TABLET ORAL at 05:54

## 2020-01-29 RX ADMIN — ATORVASTATIN CALCIUM 10 MG: 10 TABLET, FILM COATED ORAL at 21:09

## 2020-01-29 ASSESSMENT — ENCOUNTER SYMPTOMS
EYES NEGATIVE: 1
PHOTOPHOBIA: 0
TREMORS: 0
PALPITATIONS: 0
FOCAL WEAKNESS: 0
NAUSEA: 1
COUGH: 0
SPEECH CHANGE: 0
FEVER: 0
SPUTUM PRODUCTION: 0
VOMITING: 1
BRUISES/BLEEDS EASILY: 0
HALLUCINATIONS: 0
POLYDIPSIA: 0
HEMOPTYSIS: 0
ABDOMINAL PAIN: 0
BLURRED VISION: 0
HEARTBURN: 0
WHEEZING: 0
BACK PAIN: 1
WEIGHT LOSS: 0
FLANK PAIN: 0
SINUS PAIN: 0
NERVOUS/ANXIOUS: 0
SHORTNESS OF BREATH: 0
NECK PAIN: 0
DOUBLE VISION: 0
ORTHOPNEA: 0
VOMITING: 0
CHILLS: 0
HEADACHES: 0

## 2020-01-29 ASSESSMENT — LIFESTYLE VARIABLES: SUBSTANCE_ABUSE: 0

## 2020-01-29 ASSESSMENT — COGNITIVE AND FUNCTIONAL STATUS - GENERAL
DAILY ACTIVITIY SCORE: 24
SUGGESTED CMS G CODE MODIFIER MOBILITY: CH
SUGGESTED CMS G CODE MODIFIER DAILY ACTIVITY: CH
MOBILITY SCORE: 24

## 2020-01-29 NOTE — PROGRESS NOTES
Page to Dr Davila to inform of no PIV access.  Patient refused PIV access on NOC and for day shift.  After speaking to Dr Davila, he has already explained to the patient that she needs to have an IV.    I explained to the patient that IV access is necessary, patient refuses at this time    I paged Dr Davila again to inform him of the patient refusing    1150: patient now states that she will allow IV attempt after she eats lunch and has a pain pill.  I will update Dr Davila when he calls back

## 2020-01-29 NOTE — PROGRESS NOTES
Received report from Saleem CHAPA RN.  Bed in lowest position, wheels locked, call light within reach.

## 2020-01-29 NOTE — DISCHARGE PLANNING
Outpatient Dialysis Placement Notification    Received notification for outpatient dialysis placement from Dr. Anderson.    Met with patient bedside and confirmed demographic and insurance information.  Provided patient with dialysis education materials and list of HD centers. Per request referral initiated to:    [facility]  [address]  [place] [address]     Tentative Schedule: Monday, Wednesday, Friday  Tentative Time: 6:00 am       Pending medical and financial clearance.    Marleen Barboza- Dialysis Coordinator  [facility] # [phone]

## 2020-01-29 NOTE — PROGRESS NOTES
Patient was educated on if she chooses to leave the unit again without staff, we cannot guarantee that she will still have a room on this unit.

## 2020-01-29 NOTE — PROGRESS NOTES
Hospital Medicine Daily Progress Note    Date of Service  1/29/2020    Chief Complaint  64 y.o. female admitted 1/18/2020 with wound infection, back pain      Hospital Course    This is a 63 y/o F with a past medical history of Insulin dependent diabetes mellitus, hypertension, recently underwent a replacement of her spinal cord stimulator on 12/16/2019. She had developed an nearby infection around the stimulator for which she was given outpatient antibiotics including Keflex, however despite taking these antibiotics patient now complains of worsening pain around that area. Pt admitted for further treatment. Neurosurgery has been consulted appreciate rec.        Interval Problem Update  Having nausea this AM , afebrile no overnight events, nephrology following regarding dialysis appreciate rec.      Consultants/Specialty  Nephrology  Neurosurgery     Code Status  Full code    Disposition  Pt will need outpt dialysis, Sw working on arranging outpt dialysis chair     Review of Systems  Review of Systems   Constitutional: Negative for chills and weight loss.   HENT: Negative for ear pain, hearing loss and tinnitus.    Eyes: Negative for blurred vision, double vision and photophobia.   Respiratory: Negative for cough, hemoptysis and sputum production.    Cardiovascular: Negative for chest pain, palpitations and orthopnea.   Gastrointestinal: Positive for nausea. Negative for heartburn and vomiting.   Genitourinary: Negative for dysuria, flank pain, frequency and hematuria.   Musculoskeletal: Positive for back pain. Negative for joint pain and neck pain.   Skin: Negative for itching and rash.   Neurological: Negative for tremors, speech change, focal weakness and headaches.   Endo/Heme/Allergies: Negative for environmental allergies and polydipsia. Does not bruise/bleed easily.   Psychiatric/Behavioral: Negative for hallucinations and substance abuse. The patient is not nervous/anxious.         Physical Exam  Temp:   [36.7 °C (98 °F)-37.6 °C (99.6 °F)] 36.7 °C (98 °F)  Pulse:  [72-89] 72  Resp:  [18] 18  BP: (126-140)/(67-72) 126/68  SpO2:  [91 %-98 %] 98 %    Physical Exam  Vitals signs and nursing note reviewed.   Constitutional:       General: She is not in acute distress.     Appearance: Normal appearance.   HENT:      Head: Normocephalic and atraumatic.      Nose: Nose normal.      Mouth/Throat:      Mouth: Mucous membranes are moist.   Eyes:      Extraocular Movements: Extraocular movements intact.      Pupils: Pupils are equal, round, and reactive to light.   Neck:      Musculoskeletal: Normal range of motion and neck supple.   Cardiovascular:      Rate and Rhythm: Normal rate and regular rhythm.   Pulmonary:      Effort: Pulmonary effort is normal.      Breath sounds: Normal breath sounds.   Abdominal:      General: Abdomen is flat. There is no distension.      Tenderness: There is no tenderness.   Musculoskeletal: Normal range of motion.         General: No swelling or deformity.      Lumbar back: She exhibits tenderness.      Comments: Surgical site CDI   Skin:     General: Skin is warm and dry.   Neurological:      General: No focal deficit present.      Mental Status: She is alert and oriented to person, place, and time.   Psychiatric:         Mood and Affect: Mood normal.         Behavior: Behavior normal.         Fluids    Intake/Output Summary (Last 24 hours) at 1/29/2020 1228  Last data filed at 1/29/2020 0900  Gross per 24 hour   Intake 740 ml   Output 2500 ml   Net -1760 ml       Laboratory  Recent Labs     01/28/20  0409 01/29/20  0600   WBC 11.8* 11.1*   RBC 3.57* 3.76*   HEMOGLOBIN 10.7* 11.2*   HEMATOCRIT 33.9* 36.7*   MCV 95.0 97.6   MCH 30.0 29.8   MCHC 31.6* 30.5*   RDW 44.4 45.9   PLATELETCT 391 389   MPV 8.8* 8.8*     Recent Labs     01/28/20  0409 01/29/20  0600   SODIUM 139 135   POTASSIUM 4.4 4.8   CHLORIDE 101 98   CO2 27 26   GLUCOSE 239* 495*   BUN 40* 31*   CREATININE 4.41* 3.35*   CALCIUM 8.4*  8.2*                   Imaging  US-RUQ   Final Result      Sludge within the gallbladder. No gallstones or biliary ductal dilatation.      DX-PORTABLE FLUORO > 1 HOUR   Final Result      Portable fluoroscopy utilized for 6 seconds.         INTERPRETING LOCATION: [facility], [place], [address]      DX-CHEST-LIMITED (1 VIEW)   Final Result      1.  No acute cardiopulmonary abnormality.      US-RENAL   Final Result      1.  Unremarkable kidneys.   2.  No hydronephrosis.   3.  Limited evaluation of bladder.           Assessment/Plan  * Post-operative wound abscess- (present on admission)  Assessment & Plan  Involving her spinal stimulator area.  Received course of IV  Vancomycin and Zosyn   Neurosurgery following had surgery and removal of the spine stimulator yesterday  ID also following  Appreciate rec.   On linezolid until 2/3/20 per ID.  Linezolid is chosen as a safest antibiotic in the setting of acute renal failure, despite culture positive for MSSA.  Discussed with ID Dr. Reeves    RHEA (acute kidney injury) (HCC)  Assessment & Plan  Probably polyuric, but no urine output documented  Etiology unclear  Most likely ATN per nephrology  Bladder scan did not reveal retention.  Kidney ultrasound unremarkable; no hydronephrosis.  Ordered UA to look for proteinuria and sediment  We will follow with nephrology recommendations.  Avoid nephrotoxins  Uric acid and CPK not elevated.  Follow with UA, monitor input and output, monitor and correct electrolytes as needed  Linezolid is safe, no need for dose adjustment  1/25 creatinine 6.15, BUN 39.  Complaining of nausea vomiting.  Plan for hemodialysis today.  Temporary catheter will be placed  1/26 additional hemodialysis planned today  1/27 dialysis on pause.  Repeat BMP tomorrow and decide with the patient needs to go on outpatient hemodialysis    Nephrology following regarding her dialysis appreciate rec.     Hypothyroidism, postsurgical- (present on admission)  Assessment &  Plan  Resume home dose of synthroid    Hypoglycemia  Assessment & Plan  1/22 blood sugar 50 early in the morning.  Will reduce dose of Lantus from 25 to 15 units at night  Hypoglycemia product  1/24 reoccurs.  Secondary to renal failure and decreased renal insulin clearance.  Will reduce dose of Lantus to 5 units at night  1/25 will DC Lantus  1/27 reduce dose of Lantus from 8 to 5 units    Type 1 diabetes mellitus with neurological manifestations, uncontrolled (HCC)- (present on admission)  Assessment & Plan  Uncontrolled  Last a1c was 10.7  Cont Degludec.  Dose decreased to 15 units from 25 on 1/22 due to hypoglycemia  Continue Insulin-sliding scale, accu-checks and hypoglycemia protocol.  Monitor   1/24 we will further decrease dose of Lantus to 5 units due to hypoglycemia  1/25 Lantus DC'd  1/26 restart Lantus 8 units at night, as blood sugar control worsened with hemodialysis secondary to improvement of insulin clearance  1/27 reduce dose of Lantus from 8 to 5 units    Nausea & vomiting  Assessment & Plan  Associated with some heartburn.  May or may not be related to azotemia  Dialysis pending.  Started on omeprazole and sucralfate.  1/27 we will check lipase and right upper quadrant ultrasound    Anxiety  Assessment & Plan  Xanax as needed    Chest pain  Assessment & Plan  Appears to be noncardiogenic, exacerbated by deep breath  Ordered EKG and chest x-ray   Tylenol as needed    Hypertension  Assessment & Plan  Uncontrolled.  Was on losartan at home before admission.  Losartan held due to renal failure  Started on amlodipine, will increase dose to 5 mg today  1/25 blood pressure is stable  Blood pressure improved    Tobacco abuse- (present on admission)  Assessment & Plan  Smoking cessation counseling provided.         VTE prophylaxis: heparin

## 2020-01-29 NOTE — DISCHARGE PLANNING
Anticipated Discharge Disposition:   Home with help from spouse    Action:    Quantiferon drawn yesterday.  Result available 1- per lab.    Dialysis outpatient chair initiated by Marleen Barboza.    RN CM telephoned [facility] at [facility].  Tresiba cost $47, Linezolid $14, odansetron, amlodipine, omeprazole $6.  Pt informed and agreeable.    Pt requesting her medical reports.  Pt signed MANPREET form and it was faxed to [facility].    Barriers to Discharge:    Medical clearance    Plan:    Assist with disposition as needed.

## 2020-01-29 NOTE — PROGRESS NOTES
3hr HD started @ 1329 and completed @ 1630,tx well tolerated,VSS,net UF = 1500ml.RIJ TDC dressing changed,CDI,report given to Karime MORSE.

## 2020-01-29 NOTE — PROGRESS NOTES
Nephrology Daily Progress Note    Date of Service  1/29/2020    Chief Complaint  64 y.o. female admitted 1/18/2020 with infected spinal cord stimulator, status post surgical removal.  Her hospitalization has been complicated by acute kidney injury    Interval Problem Update  Patient was very anxious/teary, went through a mild panic attack when she found out that she has to stay in the hospital for renal failure and possible dialysis.  1/25 pt is still very anxious , but agreeing to dialysis  1/27 -doing much better  No complaints  Improved UOP  Holding dialysis today -watching for recovery  1/28 -doing well, no complaints  BUN and creat level worse -continue dialysis  Seen and examined during dialysis treatment -tolerates well  1/29  C/o nausea/vomiting , no abdominal pain  Good UOP  HD TTS    Review of Systems  Review of Systems   Constitutional: Positive for malaise/fatigue. Negative for chills and fever.   HENT: Negative for congestion, hearing loss and sinus pain.    Eyes: Negative.    Respiratory: Negative for cough, hemoptysis, shortness of breath and wheezing.    Cardiovascular: Negative for chest pain, palpitations, orthopnea and leg swelling.   Gastrointestinal: Positive for nausea and vomiting. Negative for abdominal pain and heartburn.   Genitourinary: Negative for dysuria, flank pain, frequency, hematuria and urgency.   Skin: Negative.    All other systems reviewed and are negative.       Physical Exam  Temp:  [36.7 °C (98 °F)-37.6 °C (99.6 °F)] 36.7 °C (98 °F)  Pulse:  [72-89] 72  Resp:  [18] 18  BP: (126-140)/(67-72) 126/68  SpO2:  [91 %-98 %] 98 %    Physical Exam  Vitals signs and nursing note reviewed.   Constitutional:       General: She is not in acute distress.     Appearance: Normal appearance. She is well-developed. She is not diaphoretic.   HENT:      Head: Normocephalic and atraumatic.      Nose: Nose normal.      Mouth/Throat:      Mouth: Mucous membranes are moist.      Pharynx: Oropharynx  is clear.   Eyes:      Conjunctiva/sclera: Conjunctivae normal.      Pupils: Pupils are equal, round, and reactive to light.   Cardiovascular:      Rate and Rhythm: Normal rate and regular rhythm.   Pulmonary:      Effort: Pulmonary effort is normal.      Breath sounds: Normal breath sounds.   Abdominal:      General: Abdomen is flat. Bowel sounds are normal. There is no distension.      Palpations: There is no mass.      Tenderness: There is no tenderness.   Musculoskeletal:      Right lower leg: No edema.      Left lower leg: No edema.   Skin:     General: Skin is warm.      Findings: No erythema or rash.   Neurological:      General: No focal deficit present.      Mental Status: She is alert and oriented to person, place, and time.      Cranial Nerves: No cranial nerve deficit.      Coordination: Coordination normal.         Fluids    Intake/Output Summary (Last 24 hours) at 1/29/2020 1430  Last data filed at 1/29/2020 1400  Gross per 24 hour   Intake 980 ml   Output 2500 ml   Net -1520 ml       Laboratory  Recent Labs     01/28/20  0409 01/29/20  0600   WBC 11.8* 11.1*   RBC 3.57* 3.76*   HEMOGLOBIN 10.7* 11.2*   HEMATOCRIT 33.9* 36.7*   MCV 95.0 97.6   MCH 30.0 29.8   MCHC 31.6* 30.5*   RDW 44.4 45.9   PLATELETCT 391 389   MPV 8.8* 8.8*     Recent Labs     01/28/20  0409 01/29/20  0600   SODIUM 139 135   POTASSIUM 4.4 4.8   CHLORIDE 101 98   CO2 27 26   GLUCOSE 239* 495*   BUN 40* 31*   CREATININE 4.41* 3.35*   CALCIUM 8.4* 8.2*         No results for input(s): NTPROBNP in the last 72 hours.        Imaging  US-RUQ   Final Result      Sludge within the gallbladder. No gallstones or biliary ductal dilatation.      DX-PORTABLE FLUORO > 1 HOUR   Final Result      Portable fluoroscopy utilized for 6 seconds.         INTERPRETING LOCATION: [address] [place] [address], [id number]      DX-CHEST-LIMITED (1 VIEW)   Final Result      1.  No acute cardiopulmonary abnormality.      US-RENAL   Final Result      1.  Unremarkable  kidneys.   2.  No hydronephrosis.   3.  Limited evaluation of bladder.            Assessment/Plan  1 RHEA/ATN - HD TTS -no recovery yet - will dialyze tomorrow  2 sepsis -recovered  3 anemia: Hb stable -to monitor closely  4 Electrolytes well controlled     To monitor Phos  5 HTN: elevated BP well controlled    Recs: continue HD TTS  Daily labs  Renal dose all meds  Avoid nephrotoxins  Needs outpt dialysis chair  Will follow             weakness

## 2020-01-29 NOTE — PROGRESS NOTES
Patient was off the unit of her own accord from 1215 to 1310.  Patient stated to this RN that she informed another RN that she was going for a walk.  This RN confirmed with the other RN that the patient did NOT tell them as they would have explained that patient's are not allowed off the unit without staff present.    This RN educated the patient about leaving the unit and Charge nurse is aware

## 2020-01-29 NOTE — PROGRESS NOTES
Bedside report received from Anny MORSE @ 2:15PM.Safety precautions in place.  at bedside.Care assumed.

## 2020-01-30 VITALS
DIASTOLIC BLOOD PRESSURE: 77 MMHG | WEIGHT: 175 LBS | HEART RATE: 82 BPM | BODY MASS INDEX: 28.25 KG/M2 | TEMPERATURE: 97.1 F | SYSTOLIC BLOOD PRESSURE: 151 MMHG | RESPIRATION RATE: 18 BRPM | OXYGEN SATURATION: 95 %

## 2020-01-30 LAB
GLUCOSE BLD-MCNC: 139 MG/DL (ref 65–99)
GLUCOSE BLD-MCNC: 275 MG/DL (ref 65–99)

## 2020-01-30 PROCEDURE — 700111 HCHG RX REV CODE 636 W/ 250 OVERRIDE (IP)

## 2020-01-30 PROCEDURE — 99239 HOSP IP/OBS DSCHRG MGMT >30: CPT | Performed by: INTERNAL MEDICINE

## 2020-01-30 PROCEDURE — A9270 NON-COVERED ITEM OR SERVICE: HCPCS | Performed by: INTERNAL MEDICINE

## 2020-01-30 PROCEDURE — 700102 HCHG RX REV CODE 250 W/ 637 OVERRIDE(OP): Performed by: INTERNAL MEDICINE

## 2020-01-30 PROCEDURE — 90935 HEMODIALYSIS ONE EVALUATION: CPT | Performed by: INTERNAL MEDICINE

## 2020-01-30 PROCEDURE — A9270 NON-COVERED ITEM OR SERVICE: HCPCS | Performed by: HOSPITALIST

## 2020-01-30 PROCEDURE — 700102 HCHG RX REV CODE 250 W/ 637 OVERRIDE(OP): Performed by: HOSPITALIST

## 2020-01-30 PROCEDURE — 5A1D70Z PERFORMANCE OF URINARY FILTRATION, INTERMITTENT, LESS THAN 6 HOURS PER DAY: ICD-10-PCS | Performed by: INTERNAL MEDICINE

## 2020-01-30 PROCEDURE — 90935 HEMODIALYSIS ONE EVALUATION: CPT

## 2020-01-30 PROCEDURE — 82962 GLUCOSE BLOOD TEST: CPT | Mod: 91

## 2020-01-30 PROCEDURE — 700111 HCHG RX REV CODE 636 W/ 250 OVERRIDE (IP): Performed by: HOSPITALIST

## 2020-01-30 RX ORDER — OXYCODONE HYDROCHLORIDE AND ACETAMINOPHEN 5; 325 MG/1; MG/1
1 TABLET ORAL EVERY 6 HOURS PRN
Qty: 12 TAB | Refills: 0 | Status: SHIPPED | OUTPATIENT
Start: 2020-01-30 | End: 2020-02-02

## 2020-01-30 RX ORDER — HEPARIN SODIUM 1000 [USP'U]/ML
INJECTION, SOLUTION INTRAVENOUS; SUBCUTANEOUS
Status: COMPLETED
Start: 2020-01-30 | End: 2020-01-30

## 2020-01-30 RX ADMIN — LINEZOLID 600 MG: 600 TABLET, FILM COATED ORAL at 05:58

## 2020-01-30 RX ADMIN — PROCHLORPERAZINE EDISYLATE 10 MG: 5 INJECTION INTRAMUSCULAR; INTRAVENOUS at 08:31

## 2020-01-30 RX ADMIN — AMLODIPINE BESYLATE 5 MG: 5 TABLET ORAL at 05:58

## 2020-01-30 RX ADMIN — SENNOSIDES AND DOCUSATE SODIUM 2 TABLET: 8.6; 5 TABLET ORAL at 05:58

## 2020-01-30 RX ADMIN — INSULIN HUMAN 5 UNITS: 100 INJECTION, SOLUTION PARENTERAL at 12:06

## 2020-01-30 RX ADMIN — HEPARIN SODIUM 2000 UNITS: 1000 INJECTION, SOLUTION INTRAVENOUS; SUBCUTANEOUS at 07:17

## 2020-01-30 RX ADMIN — NICOTINE TRANSDERMAL SYSTEM 21 MG: 21 PATCH, EXTENDED RELEASE TRANSDERMAL at 05:58

## 2020-01-30 RX ADMIN — ALPRAZOLAM 0.25 MG: 0.25 TABLET ORAL at 08:30

## 2020-01-30 RX ADMIN — OMEPRAZOLE 20 MG: 20 CAPSULE, DELAYED RELEASE ORAL at 05:58

## 2020-01-30 RX ADMIN — LEVOTHYROXINE SODIUM 175 MCG: 150 TABLET ORAL at 05:58

## 2020-01-30 RX ADMIN — ONDANSETRON 4 MG: 2 INJECTION INTRAMUSCULAR; INTRAVENOUS at 12:10

## 2020-01-30 RX ADMIN — ONDANSETRON 4 MG: 4 TABLET, ORALLY DISINTEGRATING ORAL at 05:58

## 2020-01-30 RX ADMIN — HEPARIN SODIUM 3800 UNITS: 1000 INJECTION, SOLUTION INTRAVENOUS; SUBCUTANEOUS at 10:24

## 2020-01-30 RX ADMIN — PROCHLORPERAZINE EDISYLATE 10 MG: 5 INJECTION INTRAMUSCULAR; INTRAVENOUS at 03:26

## 2020-01-30 NOTE — DISCHARGE INSTRUCTIONS
Discharge Instructions    Discharged to home by car with relative. Discharged via wheelchair, hospital escort: Yes.  Special equipment needed: Not Applicable    Be sure to schedule a follow-up appointment with your primary care doctor or any specialists as instructed.     Discharge Plan:   Diet Plan: Discussed  Activity Level: Discussed  Smoking Cessation Offered: Patient Refused  Confirmed Follow up Appointment: Patient to Call and Schedule Appointment  Confirmed Symptoms Management: Discussed  Medication Reconciliation Updated: No (Comments)  Influenza Vaccine Indication: Patient Refuses    I understand that a diet low in cholesterol, fat, and sodium is recommended for good health. Unless I have been given specific instructions below for another diet, I accept this instruction as my diet prescription.   Other diet: Diabetic    Special Instructions:   Recommend MRI L-spine in 4-6 weeks to ensure clearance of infection   Follow up with ID in 2 weeks  Attend outpatient HD chair on Saturday, 2/1/2020     · Is patient discharged on Warfarin / Coumadin?   No     Depression / Suicide Risk    As you are discharged from this [facility] facility, it is important to learn how to keep safe from harming yourself.    Recognize the warning signs:  · Abrupt changes in personality, positive or negative- including increase in energy   · Giving away possessions  · Change in eating patterns- significant weight changes-  positive or negative  · Change in sleeping patterns- unable to sleep or sleeping all the time   · Unwillingness or inability to communicate  · Depression  · Unusual sadness, discouragement and loneliness  · Talk of wanting to die  · Neglect of personal appearance   · Rebelliousness- reckless behavior  · Withdrawal from people/activities they love  · Confusion- inability to concentrate     If you or a loved one observes any of these behaviors or has concerns about self-harm, here's what you can do:  · Talk about it-  your feelings and reasons for harming yourself  · Remove any means that you might use to hurt yourself (examples: pills, rope, extension cords, firearm)  · Get professional help from the community (Mental Health, Substance Abuse, psychological counseling)  · Do not be alone:Call your Safe Contact- someone whom you trust who will be there for you.  · Call your local CRISIS HOTLINE 142-7537 or 034-741-2359  · Call your local Children's Mobile Crisis Response Team [place] (322) 863-6038 or www.Close.io  · Call the toll free National Suicide Prevention Hotlines   · National Suicide Prevention Lifeline 973-160-ZIVI (3246)  · National Hope Line Network 800-SUICIDE (500-8795)

## 2020-01-30 NOTE — DISCHARGE SUMMARY
Discharge Summary    CHIEF COMPLAINT ON ADMISSION  No chief complaint on file.      Reason for Admission  Post op Wound Infection     Admission Date  1/18/2020    CODE STATUS  Full Code    HPI & HOSPITAL COURSE  This is a 64 y.o. female with a past medical history of Insulin dependent diabetes mellitus, hypertension, recently underwent a  Replacement of her spinal cord stimulator on 12/16/2019, despite this patient developed a nearby infection around the stimulator which she was given outpatient antibiotics including Keflex, however despite taking these antibiotics her infection was not controlled. SO pt presented to ER and was admitted for further work up. Neurosurgery and infection disease were consulted. Pt had removal of her spinal stimulator. She was initially started on IV vancomycin and zosyn. She was then switched to vanco and cefepime and her cultures grew staph, so pt was switched to linezolid and will to continue on it till  2/3/20. She will need a repeat MRI of of L spine in 4-6 weeks to ensure clearance of infection pt will need to follow up with infection disease and neurosurgery as outpt in 1-2 weeks.   During her stay here pt developed renal failure and nephrology was consulted and due to her worsening kidney function patient was placed on dialysis and will need to continue on dialysis as outpt her outpt dialysis chair has been confirmed.  Pt feeling well today   She will be discharged home today       The patient met 2-midnight criteria for an inpatient stay at the time of discharge.    Discharge Date  1/30/20    FOLLOW UP ITEMS POST DISCHARGE  Neurosurgery  Infection disease  Nephrology     DISCHARGE DIAGNOSES  Principal Problem:    Post-operative wound abscess POA: Yes  Active Problems:    Hypothyroidism, postsurgical POA: Yes    RHEA (acute kidney injury) (HCC) POA: Unknown    Type 1 diabetes mellitus with neurological manifestations, uncontrolled (HCC) POA: Yes      Overview: ICD-10 transition     Hypoglycemia POA: Unknown    Tobacco abuse POA: Yes    Hypertension POA: Unknown    Chest pain POA: Unknown    Anxiety POA: Unknown    Nausea & vomiting POA: Unknown  Resolved Problems:    * No resolved hospital problems. *      FOLLOW UP  No future appointments.  No follow-up provider specified.    MEDICATIONS ON DISCHARGE     Medication List      START taking these medications      Instructions   amLODIPine 5 MG Tabs  Commonly known as:  NORVASC   Take 1 Tab by mouth every day.  Dose:  5 mg     linezolid 600 MG Tabs  Commonly known as:  ZYVOX   Take 1 Tab by mouth every 12 hours.  Dose:  600 mg     omeprazole 20 MG delayed-release capsule  Commonly known as:  PRILOSEC   Take 1 Cap by mouth every day.  Dose:  20 mg     ondansetron 4 MG Tbdp  Commonly known as:  ZOFRAN ODT   Take 1 Tab by mouth every four hours as needed for Nausea (give PO if no IV route available).  Dose:  4 mg     oxyCODONE-acetaminophen 5-325 MG Tabs  Commonly known as:  PERCOCET   Take 1 Tab by mouth every four hours as needed for up to 3 days.  Dose:  1 Tab        CHANGE how you take these medications      Instructions   Insulin Degludec 200 UNIT/ML Sopn  What changed:  how much to take  Commonly known as:  Tresiba FlexTouch   Inject 5 Units as instructed every bedtime.  Dose:  5 Units        CONTINUE taking these medications      Instructions   atorvastatin 10 MG Tabs  Commonly known as:  LIPITOR   Take 10 mg by mouth every evening.  Dose:  10 mg     levothyroxine 175 MCG Tabs  Commonly known as:  SYNTHROID   Take 175 mcg by mouth Every morning on an empty stomach.  Dose:  175 mcg     NovoLOG 100 UNIT/ML Soln  Generic drug:  insulin aspart   Inject 2-7 Units as instructed 3 times a day before meals. Sliding Scale  Dose:  2-7 Units        STOP taking these medications    cephALEXin 500 MG Caps  Commonly known as:  KEFLEX     losartan 100 MG Tabs  Commonly known as:  COZAAR            Allergies  Allergies   Allergen Reactions   • Ativan  Unspecified      Extreme Restlessness with whole body  BFM=8804   • Tape      Blisters, paper tape is ok       DIET  Orders Placed This Encounter   Procedures   • Diet Order Diabetic, Renal     Standing Status:   Standing     Number of Occurrences:   1     Order Specific Question:   Diet:     Answer:   Diabetic [3]     Order Specific Question:   Diet:     Answer:   Renal [8]       ACTIVITY  As tolerated.  Weight bearing as tolerated    CONSULTATIONS  Neurosurgery  Infection disease  Nephrology     PROCEDURES   Exploration/washout thoracic, right flank incision  Removal spinal cord stimulator  Right IJ permacath placement  LABORATORY  Lab Results   Component Value Date    SODIUM 135 01/29/2020    POTASSIUM 4.8 01/29/2020    CHLORIDE 98 01/29/2020    CO2 26 01/29/2020    GLUCOSE 495 (H) 01/29/2020    BUN 31 (H) 01/29/2020    CREATININE 3.35 (H) 01/29/2020    CREATININE 1.0 01/08/2009        Lab Results   Component Value Date    WBC 11.1 (H) 01/29/2020    HEMOGLOBIN 11.2 (L) 01/29/2020    HEMATOCRIT 36.7 (L) 01/29/2020    PLATELETCT 389 01/29/2020        Total time of the discharge process exceeds 42 minutes.

## 2020-01-30 NOTE — DISCHARGE PLANNING
Anticipated Discharge Disposition:   Home with help from spouse    Action:    Dialysis outpatient chair has been coordinated by Marleen Barboza.  She telephoned this RN BOBBY.  Pt will begin 2-1-2020 at 0615, Saturday.  quantiferon test will have been resulted.  Regular dialysis schedule will be MWF.  Pt aware of dialysis schedule and location.    RN BOBBY spoke with bedside MINI Monroe and he is aware of dialysis schedule and plan.    Barriers to Discharge:    None    Plan:    DC

## 2020-01-30 NOTE — CARE PLAN
Problem: Knowledge Deficit  Goal: Knowledge of disease process/condition, treatment plan, diagnostic tests, and medications will improve  Outcome: PROGRESSING AS EXPECTED  Note:   Information regarding disease process/condition explained,question answered.  Updated with plan of care, verbalized understanding.      Problem: Psychosocial Needs:  Goal: Level of anxiety will decrease  Outcome: PROGRESSING AS EXPECTED  Note:   Allowed patient to talk about her feelings, educated on deep breathing techniques to cope with the anxiety. Encouraged family to visit with patient for emotional support. Anxiety PRN med given.

## 2020-01-30 NOTE — DISCHARGE PLANNING
Outpatient Dialysis Placement Confirmation    Pending Quantiferon result but per Dr. Andesron, patient can discharge and attend outpatient HD chair on Saturday, 2/1/2020 as clinic will receive Quantiferon tomorrow, 1/31/2020 before Saturday.    Patient has been placed and confirmed at:    [facility]  [address]  [place] [address]     Ph# [phone]  Fax# [phone]     Schedule: Monday, Wednesday, Friday  Time: 6:00 am    Patient can start on Saturday, 2/1/2020 , and needs to be there by 6:15 am for paperwork.    LVM chaya Wilson- MINI CM ext [phone]  and relayed outpatient dialysis confirmation. Follow up message to Dr. Anderson to notify of placement. Provided patient dialysis schedule welcome letter.      Marleen Barboza- Dialysis Coordinator  [facility] Ph# [phone]

## 2020-01-30 NOTE — PROGRESS NOTES
Nephrology/Hemodialysis note    Patient with RHEA/ATN/HD  Seen and examined during dialysis treatment  Tolerates well  VS stable  Lab results reviewed  Please see dialysis flow sheet for details

## 2020-01-30 NOTE — PROGRESS NOTES
3hr HD started @ 0720 and completed @ 1021,tolerated 1000ml net UF,VSS.Ativan and compazine given by primary RN for c/o anxiety and n/v,offered relief.KENTRELL TDC dressing CDI.

## 2020-01-30 NOTE — DOCUMENTATION QUERY
"                                                                         [facility]                                                                       Query Response Note      PATIENT:               KALPANA BUTTS  ACCT #:                  [id number]  MRN:                     [id number]  :                      1955  ADMIT DATE:       2020 7:40 PM  DISCH DATE:          RESPONDING  PROVIDER #:        [id number]           QUERY TEXT:    Please clarify in documentation the relationship, if any, between Sepsis and Infected Spinal Cord Stimulator.    The patient's Clinical Indicators include:  2/4 SIRS Criteria POA- HR: 101 and WBC: 14.0. CRP: 14.87 on 20. \"Suprafascial infection thoracic surgical site, right flank surgical site infection\" documented in the OP Report on 20.    Treatments include: Spinal Cord Stimulator Removal, Infectious Disease Consult, Vancomycin, Maxipime, and Zosyn.    Risk factors include: dx Infected Spinal Cord Stimulator, dx RHEA w/ATN, and dx Sepsis.  Options provided:   -- Sepsis is due to or associated with Infected Spinal Cord Stimulator   -- Unrelated to each other   -- Unable to determine      Query created by: Michael Cueva on 2020 9:57 AM    RESPONSE TEXT:    Unable to determine          Electronically signed by:  RENE CEBALLOS MD 2020 10:00 PM              "

## 2020-02-01 LAB
GAMMA INTERFERON BACKGROUND BLD IA-ACNC: 0.05 IU/ML
M TB IFN-G BLD-IMP: NEGATIVE
M TB IFN-G CD4+ BCKGRND COR BLD-ACNC: -0.01 IU/ML
MITOGEN IGNF BCKGRD COR BLD-ACNC: 0.79 IU/ML
QFT TB2 - NIL TBQ2: -0.01 IU/ML

## 2020-02-04 ENCOUNTER — TELEPHONE (OUTPATIENT)
Dept: NEPHROLOGY | Facility: MEDICAL CENTER | Age: 65
End: 2020-02-04

## 2020-02-04 ENCOUNTER — PATIENT OUTREACH (OUTPATIENT)
Dept: HEALTH INFORMATION MANAGEMENT | Facility: OTHER | Age: 65
End: 2020-02-04

## 2020-02-04 NOTE — TELEPHONE ENCOUNTER
Patient called to let you know she goes to dialysis tomorrow and she having back pain and can barley get up   Please call patient thank you

## 2020-02-07 ENCOUNTER — APPOINTMENT (OUTPATIENT)
Dept: INFECTIOUS DISEASES | Facility: MEDICAL CENTER | Age: 65
End: 2020-02-07
Payer: MEDICARE

## 2020-02-07 NOTE — OP REPORT
NEUROSURGERY OPERATIVE NOTE  DATE:  1:16 PM 2020     PATIENT NAME:  Anu Manuel   1955 MRN [id number]        PROCEDURE:  1. Exploration/washout thoracic, right flank incision  2.  Removal spinal cord stimulator     Surgeon:  Ryan Roman MD, PhD  Assistant: none     Anesthesia:  MARIELY     Diagnosis:  Surgical site infection     Indication:  64 year old female with history of smoking and diabetes who underwent spinal cord stimulator placement over a month ago; she was doing well but developed pain and tederness a the incision sites.  Surgical exploration is planned.     Procedure:  The patient was identified in the holding area, and the surgery site marked, consent was obtained.  The patinet was brought back to the operating room and intubated by anesthesia service.  She is on scheduled antibiotics at this time.   She was transferred to the OSI operating room table in a prone manner and all pressure points well padded.  Their lumbar region was prepped with chlorhexidine and betadine scrub, and Chloroprep.  Sterile drapes were applied including a layer of Ioban.       The thoracic incision was opened sharply.  Material was expressed under pressure.  Several cc were obtained and sent for aerobic, anaerobic cultures as well as Gram stain and cell count.  The operative site was pineda irrigated with normal saline bacitracin irrigation with 1 g/L vancomycin.  The leads were identified, and the epidural paddle removed.  The anchors were removed as well.  The infection appeared to be suprafascial, did not obviously extend below the fascia.     The right flank incision was then opened sharply, with purulent material expressed out under increased pressure.  The generator was removed, the leads divided sharply and all leads and the generator removed.  The operative site was pineda irrigated with normal saline bacitracin irrigation with 1 g/L vancomycin.  Half a gram of vancomycin was then placed in each  of the incisions.  The incisions were closed in like manner after a #7 round drain had been placed in each.  The drains have been brought out through a separate incision and secured to the skin using 2-0 nylon suture.     The dermis was reapproximated with 3-0 Vicryl suture in an inverted interrupted manner.  The skin was reapproximated with staples.  A silver containing dressing was placed on each incision.  Final counts were correct.     FINDINGS: Suprafascial infection thoracic surgical site, right flank surgical site infection.  Good washout obtained.  All hardware removed.  SPECIMEN:  None  DRAINS:  #7 round to ALBINO bulb not yet thoracic and right flank incisions  EBL:  10 CC  COMPLICATIONS:  None apparent at end of procedure.

## 2020-02-11 ENCOUNTER — TELEPHONE (OUTPATIENT)
Dept: NEPHROLOGY | Facility: MEDICAL CENTER | Age: 65
End: 2020-02-11

## 2020-02-11 NOTE — DOCUMENTATION QUERY
"                                                                         [facility]                                                                       Query Response Note      PATIENT:               KALPANA BUTTS  ACCT #:                  [id number]  MRN:                     [id number]  :                      1955  ADMIT DATE:       2020 7:40 PM  DISCH DATE:        2020 2:53 PM  RESPONDING  PROVIDER #:        [id number]           QUERY TEXT:    Please clarify documentation related to the patient's infection.  If an appropriate response is not listed below, please respond with a new note.    The patient's Clinical Indicators include:  Patient is a 65 y/o female presenting with surgical wound abscess at the site of her spinal cord stimulator.  Patient had stimulator replaced on 19 and was on oral Keflex, but the infection was not controlled.  Patient had the stimulator removed this admission on  by Dr. Roman, and patient was treated with IV antibiotics.    Per the op report \"...The infection appeared to be suprafascial, did not obviously extend below the fascia.\"    The patient's condition is described as \"infected spinal cord stimulator\" and \"postoperative wound abscess\" which index to different diagnosis codes in ICD-10.  Clarification is needed.  Options provided:   -- Infected spinal cord stimulator causing abscess   -- Surgical wound abscess infecting spinal cord stimulator   -- Unable to determine      Query created by: Jenny Sheridan on 2020 6:21 AM    RESPONSE TEXT:    Infected spinal cord stimulator causing abscess          Electronically signed by:  DWIGHT VERNON MD 2020 8:55 AM              "

## 2020-02-11 NOTE — TELEPHONE ENCOUNTER
Patient called and said when she saw you at dialysis you mentioned that the creatinine test was pending at [facility] however patient stated that her blood was never drawn at [facility] and knows that the results will never come in.    Also patient is experiencing sickness every time after dialysis. She is vomiting all the time is she suppose to feel like this?? She is scared and wants to know if this is normal. Please advise thank you

## 2020-02-13 ENCOUNTER — TELEPHONE (OUTPATIENT)
Dept: NEPHROLOGY | Facility: MEDICAL CENTER | Age: 65
End: 2020-02-13

## 2020-02-13 DIAGNOSIS — N17.9 AKI (ACUTE KIDNEY INJURY) (HCC): ICD-10-CM

## 2020-02-13 NOTE — TELEPHONE ENCOUNTER
I spoke to pt regarding recent cr results 1.7 mg/dl(from [facility] lab) pt is asymptomatic, will hold HD, recheck labs Monday 2/17, pt was advised to call us with any problem.

## 2020-02-18 ENCOUNTER — HOSPITAL ENCOUNTER (OUTPATIENT)
Dept: LAB | Facility: MEDICAL CENTER | Age: 65
End: 2020-02-18
Attending: INTERNAL MEDICINE
Payer: MEDICARE

## 2020-02-18 DIAGNOSIS — N17.9 AKI (ACUTE KIDNEY INJURY) (HCC): ICD-10-CM

## 2020-02-18 LAB
ANION GAP SERPL CALC-SCNC: 12 MMOL/L (ref 0–11.9)
BUN SERPL-MCNC: 19 MG/DL (ref 8–22)
CALCIUM SERPL-MCNC: 9.4 MG/DL (ref 8.5–10.5)
CHLORIDE SERPL-SCNC: 108 MMOL/L (ref 96–112)
CO2 SERPL-SCNC: 25 MMOL/L (ref 20–33)
CREAT SERPL-MCNC: 1.45 MG/DL (ref 0.5–1.4)
GLUCOSE SERPL-MCNC: 43 MG/DL (ref 65–99)
POTASSIUM SERPL-SCNC: 4.4 MMOL/L (ref 3.6–5.5)
SODIUM SERPL-SCNC: 145 MMOL/L (ref 135–145)

## 2020-02-18 PROCEDURE — 36415 COLL VENOUS BLD VENIPUNCTURE: CPT

## 2020-02-18 PROCEDURE — 80048 BASIC METABOLIC PNL TOTAL CA: CPT

## 2020-02-19 DIAGNOSIS — N17.9 AKI (ACUTE KIDNEY INJURY) (HCC): ICD-10-CM

## 2020-02-24 ENCOUNTER — HOSPITAL ENCOUNTER (OUTPATIENT)
Facility: MEDICAL CENTER | Age: 65
End: 2020-02-24
Attending: INTERNAL MEDICINE | Admitting: INTERNAL MEDICINE
Payer: MEDICARE

## 2020-02-27 ENCOUNTER — APPOINTMENT (OUTPATIENT)
Dept: ADMISSIONS | Facility: MEDICAL CENTER | Age: 65
End: 2020-02-27
Attending: NURSE PRACTITIONER
Payer: MEDICARE

## 2020-02-27 ENCOUNTER — HOSPITAL ENCOUNTER (OUTPATIENT)
Dept: RADIOLOGY | Facility: MEDICAL CENTER | Age: 65
End: 2020-02-27
Attending: NURSE PRACTITIONER
Payer: MEDICARE

## 2020-02-27 DIAGNOSIS — M25.551 RIGHT HIP PAIN: ICD-10-CM

## 2020-02-27 PROCEDURE — 73521 X-RAY EXAM HIPS BI 2 VIEWS: CPT

## 2020-02-28 ENCOUNTER — HOSPITAL ENCOUNTER (OUTPATIENT)
Dept: RADIOLOGY | Facility: MEDICAL CENTER | Age: 65
End: 2020-02-28
Attending: NURSE PRACTITIONER
Payer: MEDICARE

## 2020-02-28 ENCOUNTER — PATIENT OUTREACH (OUTPATIENT)
Dept: HEALTH INFORMATION MANAGEMENT | Facility: OTHER | Age: 65
End: 2020-02-28

## 2020-02-28 DIAGNOSIS — T81.41XD INFECTION FOLLOWING A PROCEDURE, SUPERFICIAL INCISIONAL SURGICAL SITE, SUBSEQUENT ENCOUNTER: ICD-10-CM

## 2020-02-28 DIAGNOSIS — T81.49XA POSTOPERATIVE WOUND INFECTION: ICD-10-CM

## 2020-02-28 PROCEDURE — 72146 MRI CHEST SPINE W/O DYE: CPT

## 2020-02-28 PROCEDURE — 72148 MRI LUMBAR SPINE W/O DYE: CPT

## 2020-03-03 ENCOUNTER — HOSPITAL ENCOUNTER (OUTPATIENT)
Dept: RADIOLOGY | Facility: MEDICAL CENTER | Age: 65
End: 2020-03-03
Attending: INTERNAL MEDICINE
Payer: MEDICARE

## 2020-03-03 DIAGNOSIS — N17.9 AKI (ACUTE KIDNEY INJURY) (HCC): ICD-10-CM

## 2020-03-03 PROCEDURE — 36589 REMOVAL TUNNELED CV CATH: CPT

## 2020-03-03 NOTE — PROGRESS NOTES
Pt here for dialysis catheter removal.  Pt taken into procedure room by RISHABH ambrosio.  Line removed by Dr. Pittman.  Pt ambulatory out of the department.

## 2020-03-04 NOTE — PROGRESS NOTES
A 64-year-old female was a physician direct admit admission to [facility] from 1/18/2020 to 1/30/2020 to treat Infection following a procedure, other surgical site, init. [facility] visited the patient bedside. The patient was discharged Home. The patient's medical condition included: Infection. The patient was not under clinical case management.     The patient was ordered to start/continue to take the following medications: Tresiba, Oxycodone Hydrochloride (Percocet), Ondansetron (Zofran), OMEPRAZOLE MAGNESIUM (PRILOSEC), Linezolid (Zyvox), and Amlodipine Besylate (Norvasc). The patient successfully filled all medications.     The patient was ordered to follow-up with Specialist and PCP. The patient had the following appointments:     1) 1/30/2020 @ 10:00 Ryan Roman, neurosurgery - CONFIRMED AS KEPT     2) 2/4/2020 @ 2:00 Jarrell Yates, internal medicine - CONFIRMED AS KEPT     3) 2/11/2020 @ 9:00 Maye Anderson, nephrology - CONFIRMED AS KEPT     4) 2/24/2020 @ 12:00 Marianna Reeves, infectious disease - CONFIRMED AS KEPT  The patient has no future appointments scheduled.       [facility] followed the patient for a total of 39 days and Patient  was receptive to services. In summary, [facility] Worked closely with the patient's family or caregivers to assist the patient. As a result, Patient adhered with Discharge Orders, Patient attended follow up appointments, and Patient successfully recovered or avoided further complications.

## 2020-03-24 ENCOUNTER — TELEPHONE (OUTPATIENT)
Dept: INFECTIOUS DISEASES | Facility: MEDICAL CENTER | Age: 65
End: 2020-03-24

## 2020-03-24 ENCOUNTER — APPOINTMENT (OUTPATIENT)
Dept: INFECTIOUS DISEASES | Facility: MEDICAL CENTER | Age: 65
End: 2020-03-24
Payer: MEDICARE

## 2020-03-24 NOTE — TELEPHONE ENCOUNTER
Per [place] APRN if patient is off antibiotics and doing well visit may be postponed considering pt had to be rescheduled from Dr Rolle today. Patient agreed.

## 2020-03-27 ENCOUNTER — HOSPITAL ENCOUNTER (OUTPATIENT)
Dept: LAB | Facility: MEDICAL CENTER | Age: 65
End: 2020-03-27
Attending: NURSE PRACTITIONER
Payer: MEDICARE

## 2020-03-27 LAB
ALBUMIN SERPL BCP-MCNC: 4 G/DL (ref 3.2–4.9)
ALBUMIN/GLOB SERPL: 1.3 G/DL
ALP SERPL-CCNC: 139 U/L (ref 30–99)
ALT SERPL-CCNC: 31 U/L (ref 2–50)
AMORPH CRY #/AREA URNS HPF: PRESENT /HPF
ANION GAP SERPL CALC-SCNC: 11 MMOL/L (ref 7–16)
APPEARANCE UR: ABNORMAL
AST SERPL-CCNC: 21 U/L (ref 12–45)
BACTERIA #/AREA URNS HPF: NEGATIVE /HPF
BASOPHILS # BLD AUTO: 1.4 % (ref 0–1.8)
BASOPHILS # BLD: 0.09 K/UL (ref 0–0.12)
BILIRUB SERPL-MCNC: 0.2 MG/DL (ref 0.1–1.5)
BILIRUB UR QL STRIP.AUTO: NEGATIVE
BUN SERPL-MCNC: 18 MG/DL (ref 8–22)
CALCIUM SERPL-MCNC: 9.1 MG/DL (ref 8.5–10.5)
CHLORIDE SERPL-SCNC: 103 MMOL/L (ref 96–112)
CO2 SERPL-SCNC: 26 MMOL/L (ref 20–33)
COLOR UR: YELLOW
CREAT SERPL-MCNC: 1.3 MG/DL (ref 0.5–1.4)
CREAT UR-MCNC: 178.74 MG/DL
EOSINOPHIL # BLD AUTO: 0.26 K/UL (ref 0–0.51)
EOSINOPHIL NFR BLD: 3.9 % (ref 0–6.9)
EPI CELLS #/AREA URNS HPF: ABNORMAL /HPF
ERYTHROCYTE [DISTWIDTH] IN BLOOD BY AUTOMATED COUNT: 49.7 FL (ref 35.9–50)
EST. AVERAGE GLUCOSE BLD GHB EST-MCNC: 223 MG/DL
FOLATE SERPL-MCNC: 4.9 NG/ML
GLOBULIN SER CALC-MCNC: 3 G/DL (ref 1.9–3.5)
GLUCOSE SERPL-MCNC: 133 MG/DL (ref 65–99)
GLUCOSE UR STRIP.AUTO-MCNC: 100 MG/DL
HBA1C MFR BLD: 9.4 % (ref 0–5.6)
HCT VFR BLD AUTO: 36.4 % (ref 37–47)
HCV AB SER QL: NORMAL
HGB BLD-MCNC: 11.8 G/DL (ref 12–16)
HYALINE CASTS #/AREA URNS LPF: ABNORMAL /LPF
IMM GRANULOCYTES # BLD AUTO: 0.02 K/UL (ref 0–0.11)
IMM GRANULOCYTES NFR BLD AUTO: 0.3 % (ref 0–0.9)
KETONES UR STRIP.AUTO-MCNC: NEGATIVE MG/DL
LEUKOCYTE ESTERASE UR QL STRIP.AUTO: NEGATIVE
LYMPHOCYTES # BLD AUTO: 1.4 K/UL (ref 1–4.8)
LYMPHOCYTES NFR BLD: 21.1 % (ref 22–41)
MCH RBC QN AUTO: 30.1 PG (ref 27–33)
MCHC RBC AUTO-ENTMCNC: 32.4 G/DL (ref 33.6–35)
MCV RBC AUTO: 92.9 FL (ref 81.4–97.8)
MICRO URNS: ABNORMAL
MICROALBUMIN UR-MCNC: 4.5 MG/DL
MICROALBUMIN/CREAT UR: 25 MG/G (ref 0–30)
MONOCYTES # BLD AUTO: 0.63 K/UL (ref 0–0.85)
MONOCYTES NFR BLD AUTO: 9.5 % (ref 0–13.4)
NEUTROPHILS # BLD AUTO: 4.22 K/UL (ref 2–7.15)
NEUTROPHILS NFR BLD: 63.8 % (ref 44–72)
NITRITE UR QL STRIP.AUTO: NEGATIVE
NRBC # BLD AUTO: 0 K/UL
NRBC BLD-RTO: 0 /100 WBC
PH UR STRIP.AUTO: 6.5 [PH] (ref 5–8)
PLATELET # BLD AUTO: 389 K/UL (ref 164–446)
PMV BLD AUTO: 9.7 FL (ref 9–12.9)
POTASSIUM SERPL-SCNC: 3.9 MMOL/L (ref 3.6–5.5)
PROT SERPL-MCNC: 7 G/DL (ref 6–8.2)
PROT UR QL STRIP: NEGATIVE MG/DL
RBC # BLD AUTO: 3.92 M/UL (ref 4.2–5.4)
RBC # URNS HPF: ABNORMAL /HPF
RBC UR QL AUTO: NEGATIVE
SODIUM SERPL-SCNC: 140 MMOL/L (ref 135–145)
SP GR UR STRIP.AUTO: 1.02
T3FREE SERPL-MCNC: 1.72 PG/ML (ref 2.4–4.2)
TSH SERPL DL<=0.005 MIU/L-ACNC: 15 UIU/ML (ref 0.38–5.33)
UROBILINOGEN UR STRIP.AUTO-MCNC: 0.2 MG/DL
VIT B12 SERPL-MCNC: 427 PG/ML (ref 211–911)
WBC # BLD AUTO: 6.6 K/UL (ref 4.8–10.8)
WBC #/AREA URNS HPF: ABNORMAL /HPF

## 2020-03-27 PROCEDURE — 82525 ASSAY OF COPPER: CPT

## 2020-03-27 PROCEDURE — 80053 COMPREHEN METABOLIC PANEL: CPT

## 2020-03-27 PROCEDURE — 83704 LIPOPROTEIN BLD QUAN PART: CPT

## 2020-03-27 PROCEDURE — 80061 LIPID PANEL: CPT

## 2020-03-27 PROCEDURE — 82607 VITAMIN B-12: CPT

## 2020-03-27 PROCEDURE — 83036 HEMOGLOBIN GLYCOSYLATED A1C: CPT

## 2020-03-27 PROCEDURE — 82652 VIT D 1 25-DIHYDROXY: CPT

## 2020-03-27 PROCEDURE — 84207 ASSAY OF VITAMIN B-6: CPT

## 2020-03-27 PROCEDURE — 84443 ASSAY THYROID STIM HORMONE: CPT

## 2020-03-27 PROCEDURE — 36415 COLL VENOUS BLD VENIPUNCTURE: CPT

## 2020-03-27 PROCEDURE — 84481 FREE ASSAY (FT-3): CPT

## 2020-03-27 PROCEDURE — 85025 COMPLETE CBC W/AUTO DIFF WBC: CPT

## 2020-03-27 PROCEDURE — 84425 ASSAY OF VITAMIN B-1: CPT

## 2020-03-27 PROCEDURE — 82570 ASSAY OF URINE CREATININE: CPT

## 2020-03-27 PROCEDURE — 84630 ASSAY OF ZINC: CPT

## 2020-03-27 PROCEDURE — 86803 HEPATITIS C AB TEST: CPT

## 2020-03-27 PROCEDURE — 82043 UR ALBUMIN QUANTITATIVE: CPT

## 2020-03-27 PROCEDURE — 81001 URINALYSIS AUTO W/SCOPE: CPT

## 2020-03-27 PROCEDURE — 82746 ASSAY OF FOLIC ACID SERUM: CPT

## 2020-03-27 PROCEDURE — 84439 ASSAY OF FREE THYROXINE: CPT

## 2020-03-28 LAB
1,25(OH)2D3 SERPL-MCNC: 21.7 PG/ML (ref 19.9–79.3)
COPPER SERPL-MCNC: 146.5 UG/DL (ref 80–155)
ZINC SERPL-MCNC: 78.9 UG/DL (ref 60–120)

## 2020-03-29 LAB
CHOLEST SERPL-MCNC: 157 MG/DL
HDL PARTICAL NO Q4363: 35.1 UMOL/L
HDL SIZE Q4361: 8.9 NM
HDLC SERPL-MCNC: 58 MG/DL (ref 40–59)
HLD.LARGE SERPL-SCNC: 5.1 UMOL/L
L VLDL PART NO Q4357: 1.6 NMOL/L
LDL SERPL QN: 21.1 NM
LDL SERPL-SCNC: 1044 NMOL/L
LDL SMALL SERPL-SCNC: 403 NMOL/L
LDLC SERPL CALC-MCNC: 80 MG/DL
PATHOLOGY STUDY: NORMAL
TRIGL SERPL-MCNC: 94 MG/DL (ref 30–149)
VIT B1 BLD-MCNC: 101 NMOL/L (ref 70–180)
VIT B6 SERPL-MCNC: 10.8 NMOL/L (ref 20–125)
VLDL SIZE Q4362: 44.9 NM

## 2020-03-30 ENCOUNTER — TELEPHONE (OUTPATIENT)
Dept: NEPHROLOGY | Facility: MEDICAL CENTER | Age: 65
End: 2020-03-30

## 2020-03-30 NOTE — TELEPHONE ENCOUNTER
Pt has an appointment with you on Wednesday 4/1/2020. She had some labs done and wants to know if you could just giver her a call with the results or if it is necessary for her to come in. She is concerned about the coronavirus. Please advise.  Anu: [phone]    Thank you

## 2020-04-01 ENCOUNTER — APPOINTMENT (OUTPATIENT)
Dept: NEPHROLOGY | Facility: MEDICAL CENTER | Age: 65
End: 2020-04-01
Payer: MEDICARE

## 2020-04-03 LAB — T4 FREE SERPL DIALY-MCNC: 1.4 NG/DL (ref 1.1–2.4)

## 2020-06-23 ENCOUNTER — APPOINTMENT (OUTPATIENT)
Dept: RADIOLOGY | Facility: MEDICAL CENTER | Age: 65
End: 2020-06-23
Attending: EMERGENCY MEDICINE
Payer: MEDICARE

## 2020-06-23 ENCOUNTER — APPOINTMENT (OUTPATIENT)
Dept: RADIOLOGY | Facility: MEDICAL CENTER | Age: 65
End: 2020-06-23
Attending: NURSE PRACTITIONER
Payer: MEDICARE

## 2020-06-23 ENCOUNTER — HOSPITAL ENCOUNTER (OUTPATIENT)
Facility: MEDICAL CENTER | Age: 65
End: 2020-06-24
Attending: EMERGENCY MEDICINE | Admitting: FAMILY MEDICINE
Payer: MEDICARE

## 2020-06-23 ENCOUNTER — APPOINTMENT (OUTPATIENT)
Dept: CARDIOLOGY | Facility: MEDICAL CENTER | Age: 65
End: 2020-06-23
Attending: FAMILY MEDICINE
Payer: MEDICARE

## 2020-06-23 DIAGNOSIS — R07.9 CHEST PAIN, UNSPECIFIED TYPE: ICD-10-CM

## 2020-06-23 PROBLEM — K21.9 ACID REFLUX: Status: ACTIVE | Noted: 2020-06-23

## 2020-06-23 PROBLEM — R04.2 HEMOPTYSIS: Status: ACTIVE | Noted: 2020-06-23

## 2020-06-23 PROBLEM — R91.1 LUNG NODULE: Status: ACTIVE | Noted: 2020-06-23

## 2020-06-23 PROBLEM — E78.5 DYSLIPIDEMIA: Status: ACTIVE | Noted: 2020-06-23

## 2020-06-23 PROBLEM — N18.30 CKD (CHRONIC KIDNEY DISEASE) STAGE 3, GFR 30-59 ML/MIN: Status: ACTIVE | Noted: 2020-06-23

## 2020-06-23 LAB
ALBUMIN SERPL BCP-MCNC: 3.8 G/DL (ref 3.2–4.9)
ALBUMIN/GLOB SERPL: 1.5 G/DL
ALP SERPL-CCNC: 142 U/L (ref 30–99)
ALT SERPL-CCNC: 31 U/L (ref 2–50)
ANION GAP SERPL CALC-SCNC: 11 MMOL/L (ref 7–16)
AST SERPL-CCNC: 19 U/L (ref 12–45)
BASOPHILS # BLD AUTO: 1.1 % (ref 0–1.8)
BASOPHILS # BLD: 0.07 K/UL (ref 0–0.12)
BILIRUB SERPL-MCNC: 0.2 MG/DL (ref 0.1–1.5)
BUN SERPL-MCNC: 19 MG/DL (ref 8–22)
CALCIUM SERPL-MCNC: 8.7 MG/DL (ref 8.5–10.5)
CHLORIDE SERPL-SCNC: 104 MMOL/L (ref 96–112)
CO2 SERPL-SCNC: 24 MMOL/L (ref 20–33)
CREAT SERPL-MCNC: 1.2 MG/DL (ref 0.5–1.4)
EKG IMPRESSION: NORMAL
EOSINOPHIL # BLD AUTO: 0.23 K/UL (ref 0–0.51)
EOSINOPHIL NFR BLD: 3.5 % (ref 0–6.9)
ERYTHROCYTE [DISTWIDTH] IN BLOOD BY AUTOMATED COUNT: 47.3 FL (ref 35.9–50)
GLOBULIN SER CALC-MCNC: 2.5 G/DL (ref 1.9–3.5)
GLUCOSE BLD-MCNC: 193 MG/DL (ref 65–99)
GLUCOSE SERPL-MCNC: 170 MG/DL (ref 65–99)
HCT VFR BLD AUTO: 36.2 % (ref 37–47)
HGB BLD-MCNC: 11.6 G/DL (ref 12–16)
IMM GRANULOCYTES # BLD AUTO: 0.02 K/UL (ref 0–0.11)
IMM GRANULOCYTES NFR BLD AUTO: 0.3 % (ref 0–0.9)
LYMPHOCYTES # BLD AUTO: 1.43 K/UL (ref 1–4.8)
LYMPHOCYTES NFR BLD: 21.9 % (ref 22–41)
MCH RBC QN AUTO: 30.4 PG (ref 27–33)
MCHC RBC AUTO-ENTMCNC: 32 G/DL (ref 33.6–35)
MCV RBC AUTO: 95 FL (ref 81.4–97.8)
MONOCYTES # BLD AUTO: 0.71 K/UL (ref 0–0.85)
MONOCYTES NFR BLD AUTO: 10.9 % (ref 0–13.4)
NEUTROPHILS # BLD AUTO: 4.06 K/UL (ref 2–7.15)
NEUTROPHILS NFR BLD: 62.3 % (ref 44–72)
NRBC # BLD AUTO: 0 K/UL
NRBC BLD-RTO: 0 /100 WBC
PLATELET # BLD AUTO: 293 K/UL (ref 164–446)
PMV BLD AUTO: 9.2 FL (ref 9–12.9)
POTASSIUM SERPL-SCNC: 4.8 MMOL/L (ref 3.6–5.5)
PROT SERPL-MCNC: 6.3 G/DL (ref 6–8.2)
RBC # BLD AUTO: 3.81 M/UL (ref 4.2–5.4)
SODIUM SERPL-SCNC: 139 MMOL/L (ref 135–145)
TROPONIN T SERPL-MCNC: 20 NG/L (ref 6–19)
TROPONIN T SERPL-MCNC: 25 NG/L (ref 6–19)
WBC # BLD AUTO: 6.5 K/UL (ref 4.8–10.8)

## 2020-06-23 PROCEDURE — 96372 THER/PROPH/DIAG INJ SC/IM: CPT

## 2020-06-23 PROCEDURE — 700102 HCHG RX REV CODE 250 W/ 637 OVERRIDE(OP): Performed by: INTERNAL MEDICINE

## 2020-06-23 PROCEDURE — 96375 TX/PRO/DX INJ NEW DRUG ADDON: CPT

## 2020-06-23 PROCEDURE — 71045 X-RAY EXAM CHEST 1 VIEW: CPT

## 2020-06-23 PROCEDURE — 700102 HCHG RX REV CODE 250 W/ 637 OVERRIDE(OP): Performed by: FAMILY MEDICINE

## 2020-06-23 PROCEDURE — 93306 TTE W/DOPPLER COMPLETE: CPT | Mod: 26 | Performed by: INTERNAL MEDICINE

## 2020-06-23 PROCEDURE — 93005 ELECTROCARDIOGRAM TRACING: CPT | Performed by: FAMILY MEDICINE

## 2020-06-23 PROCEDURE — 99285 EMERGENCY DEPT VISIT HI MDM: CPT

## 2020-06-23 PROCEDURE — 84484 ASSAY OF TROPONIN QUANT: CPT

## 2020-06-23 PROCEDURE — 99220 PR INITIAL OBSERVATION CARE,LEVL III: CPT | Performed by: FAMILY MEDICINE

## 2020-06-23 PROCEDURE — 700111 HCHG RX REV CODE 636 W/ 250 OVERRIDE (IP): Performed by: EMERGENCY MEDICINE

## 2020-06-23 PROCEDURE — 93306 TTE W/DOPPLER COMPLETE: CPT

## 2020-06-23 PROCEDURE — G0378 HOSPITAL OBSERVATION PER HR: HCPCS

## 2020-06-23 PROCEDURE — 85025 COMPLETE CBC W/AUTO DIFF WBC: CPT

## 2020-06-23 PROCEDURE — A9270 NON-COVERED ITEM OR SERVICE: HCPCS | Performed by: FAMILY MEDICINE

## 2020-06-23 PROCEDURE — 93005 ELECTROCARDIOGRAM TRACING: CPT | Performed by: EMERGENCY MEDICINE

## 2020-06-23 PROCEDURE — 96374 THER/PROPH/DIAG INJ IV PUSH: CPT

## 2020-06-23 PROCEDURE — 700117 HCHG RX CONTRAST REV CODE 255: Performed by: EMERGENCY MEDICINE

## 2020-06-23 PROCEDURE — 93010 ELECTROCARDIOGRAM REPORT: CPT | Performed by: INTERNAL MEDICINE

## 2020-06-23 PROCEDURE — 82962 GLUCOSE BLOOD TEST: CPT

## 2020-06-23 PROCEDURE — 71275 CT ANGIOGRAPHY CHEST: CPT

## 2020-06-23 PROCEDURE — 80053 COMPREHEN METABOLIC PANEL: CPT

## 2020-06-23 RX ORDER — AMINOPHYLLINE 25 MG/ML
100 INJECTION, SOLUTION INTRAVENOUS
Status: DISCONTINUED | OUTPATIENT
Start: 2020-06-23 | End: 2020-06-24 | Stop reason: HOSPADM

## 2020-06-23 RX ORDER — AMOXICILLIN 250 MG
2 CAPSULE ORAL 2 TIMES DAILY
Status: DISCONTINUED | OUTPATIENT
Start: 2020-06-23 | End: 2020-06-24 | Stop reason: HOSPADM

## 2020-06-23 RX ORDER — VITAMIN B COMPLEX
1000 TABLET ORAL EVERY EVENING
COMMUNITY
End: 2020-08-09

## 2020-06-23 RX ORDER — ONDANSETRON 2 MG/ML
4 INJECTION INTRAMUSCULAR; INTRAVENOUS EVERY 4 HOURS PRN
Status: DISCONTINUED | OUTPATIENT
Start: 2020-06-23 | End: 2020-06-24 | Stop reason: HOSPADM

## 2020-06-23 RX ORDER — POLYETHYLENE GLYCOL 3350 17 G/17G
1 POWDER, FOR SOLUTION ORAL
Status: DISCONTINUED | OUTPATIENT
Start: 2020-06-23 | End: 2020-06-24 | Stop reason: HOSPADM

## 2020-06-23 RX ORDER — NICOTINE 21 MG/24HR
21 PATCH, TRANSDERMAL 24 HOURS TRANSDERMAL
Status: DISCONTINUED | OUTPATIENT
Start: 2020-06-24 | End: 2020-06-24 | Stop reason: HOSPADM

## 2020-06-23 RX ORDER — ONDANSETRON 4 MG/1
4 TABLET, ORALLY DISINTEGRATING ORAL EVERY 4 HOURS PRN
Status: DISCONTINUED | OUTPATIENT
Start: 2020-06-23 | End: 2020-06-24 | Stop reason: HOSPADM

## 2020-06-23 RX ORDER — NICOTINE POLACRILEX 2 MG
1 GUM BUCCAL EVERY EVENING
COMMUNITY
End: 2020-08-09

## 2020-06-23 RX ORDER — HYDROCODONE BITARTRATE AND ACETAMINOPHEN 5; 325 MG/1; MG/1
1-2 TABLET ORAL EVERY 6 HOURS PRN
Status: DISCONTINUED | OUTPATIENT
Start: 2020-06-23 | End: 2020-06-24 | Stop reason: HOSPADM

## 2020-06-23 RX ORDER — BISACODYL 10 MG
10 SUPPOSITORY, RECTAL RECTAL
Status: DISCONTINUED | OUTPATIENT
Start: 2020-06-23 | End: 2020-06-24 | Stop reason: HOSPADM

## 2020-06-23 RX ORDER — AMLODIPINE BESYLATE 10 MG/1
10 TABLET ORAL EVERY EVENING
Status: ON HOLD | COMMUNITY
End: 2020-08-11

## 2020-06-23 RX ORDER — TRAZODONE HYDROCHLORIDE 100 MG/1
100 TABLET ORAL
Status: ON HOLD | COMMUNITY
End: 2021-09-26

## 2020-06-23 RX ORDER — HYDRALAZINE HYDROCHLORIDE 20 MG/ML
10 INJECTION INTRAMUSCULAR; INTRAVENOUS EVERY 6 HOURS PRN
Status: DISCONTINUED | OUTPATIENT
Start: 2020-06-23 | End: 2020-06-24 | Stop reason: HOSPADM

## 2020-06-23 RX ORDER — REGADENOSON 0.08 MG/ML
0.4 INJECTION, SOLUTION INTRAVENOUS
Status: COMPLETED | OUTPATIENT
Start: 2020-06-23 | End: 2020-06-24

## 2020-06-23 RX ORDER — AMLODIPINE BESYLATE 10 MG/1
10 TABLET ORAL EVERY EVENING
Status: DISCONTINUED | OUTPATIENT
Start: 2020-06-23 | End: 2020-06-24 | Stop reason: HOSPADM

## 2020-06-23 RX ORDER — ACETAMINOPHEN 325 MG/1
650 TABLET ORAL EVERY 6 HOURS PRN
Status: DISCONTINUED | OUTPATIENT
Start: 2020-06-23 | End: 2020-06-24 | Stop reason: HOSPADM

## 2020-06-23 RX ORDER — OMEPRAZOLE 20 MG/1
20 CAPSULE, DELAYED RELEASE ORAL 2 TIMES DAILY
Status: DISCONTINUED | OUTPATIENT
Start: 2020-06-23 | End: 2020-06-24 | Stop reason: HOSPADM

## 2020-06-23 RX ORDER — PROMETHAZINE HYDROCHLORIDE 25 MG/1
12.5-25 TABLET ORAL EVERY 4 HOURS PRN
Status: DISCONTINUED | OUTPATIENT
Start: 2020-06-23 | End: 2020-06-24 | Stop reason: HOSPADM

## 2020-06-23 RX ORDER — PROCHLORPERAZINE EDISYLATE 5 MG/ML
5-10 INJECTION INTRAMUSCULAR; INTRAVENOUS EVERY 4 HOURS PRN
Status: DISCONTINUED | OUTPATIENT
Start: 2020-06-23 | End: 2020-06-24 | Stop reason: HOSPADM

## 2020-06-23 RX ORDER — PROMETHAZINE HYDROCHLORIDE 25 MG/1
12.5-25 SUPPOSITORY RECTAL EVERY 4 HOURS PRN
Status: DISCONTINUED | OUTPATIENT
Start: 2020-06-23 | End: 2020-06-24 | Stop reason: HOSPADM

## 2020-06-23 RX ORDER — PYRIDOXINE HCL (VITAMIN B6) 50 MG
50 TABLET ORAL EVERY MORNING
COMMUNITY
End: 2020-08-17

## 2020-06-23 RX ORDER — ATORVASTATIN CALCIUM 20 MG/1
20 TABLET, FILM COATED ORAL NIGHTLY
Status: DISCONTINUED | OUTPATIENT
Start: 2020-06-23 | End: 2020-06-24 | Stop reason: HOSPADM

## 2020-06-23 RX ORDER — INSULIN GLARGINE 100 [IU]/ML
26 INJECTION, SOLUTION SUBCUTANEOUS EVERY EVENING
Status: DISCONTINUED | OUTPATIENT
Start: 2020-06-23 | End: 2020-06-24

## 2020-06-23 RX ORDER — ENALAPRILAT 1.25 MG/ML
1.25 INJECTION INTRAVENOUS EVERY 6 HOURS PRN
Status: DISCONTINUED | OUTPATIENT
Start: 2020-06-23 | End: 2020-06-23

## 2020-06-23 RX ORDER — DEXTROSE MONOHYDRATE 25 G/50ML
50 INJECTION, SOLUTION INTRAVENOUS
Status: DISCONTINUED | OUTPATIENT
Start: 2020-06-23 | End: 2020-06-24 | Stop reason: HOSPADM

## 2020-06-23 RX ORDER — ONDANSETRON 2 MG/ML
4 INJECTION INTRAMUSCULAR; INTRAVENOUS ONCE
Status: COMPLETED | OUTPATIENT
Start: 2020-06-23 | End: 2020-06-23

## 2020-06-23 RX ORDER — LEVOTHYROXINE SODIUM 0.2 MG/1
200 TABLET ORAL
Status: DISCONTINUED | OUTPATIENT
Start: 2020-06-24 | End: 2020-06-24 | Stop reason: HOSPADM

## 2020-06-23 RX ORDER — INSULIN DEGLUDEC INJECTION 100 U/ML
26 INJECTION, SOLUTION SUBCUTANEOUS EVERY EVENING
Status: ON HOLD | COMMUNITY
End: 2020-08-11 | Stop reason: SDUPTHER

## 2020-06-23 RX ORDER — TRAZODONE HYDROCHLORIDE 50 MG/1
100 TABLET ORAL NIGHTLY
Status: DISCONTINUED | OUTPATIENT
Start: 2020-06-23 | End: 2020-06-24 | Stop reason: HOSPADM

## 2020-06-23 RX ADMIN — OMEPRAZOLE 20 MG: 20 CAPSULE, DELAYED RELEASE ORAL at 20:10

## 2020-06-23 RX ADMIN — AMLODIPINE BESYLATE 10 MG: 10 TABLET ORAL at 20:11

## 2020-06-23 RX ADMIN — DOCUSATE SODIUM 50 MG AND SENNOSIDES 8.6 MG 2 TABLET: 8.6; 5 TABLET, FILM COATED ORAL at 20:10

## 2020-06-23 RX ADMIN — TRAZODONE HYDROCHLORIDE 100 MG: 50 TABLET ORAL at 20:12

## 2020-06-23 RX ADMIN — ATORVASTATIN CALCIUM 20 MG: 20 TABLET, FILM COATED ORAL at 20:11

## 2020-06-23 RX ADMIN — INSULIN HUMAN 2 UNITS: 100 INJECTION, SOLUTION PARENTERAL at 21:14

## 2020-06-23 RX ADMIN — IOHEXOL 50 ML: 350 INJECTION, SOLUTION INTRAVENOUS at 16:21

## 2020-06-23 RX ADMIN — INSULIN GLARGINE 26 UNITS: 100 INJECTION, SOLUTION SUBCUTANEOUS at 22:48

## 2020-06-23 RX ADMIN — ONDANSETRON 4 MG: 2 INJECTION INTRAMUSCULAR; INTRAVENOUS at 15:47

## 2020-06-23 RX ADMIN — FENTANYL CITRATE 50 MCG: 50 INJECTION INTRAMUSCULAR; INTRAVENOUS at 15:47

## 2020-06-23 SDOH — ECONOMIC STABILITY: TRANSPORTATION INSECURITY
IN THE PAST 12 MONTHS, HAS LACK OF TRANSPORTATION KEPT YOU FROM MEETINGS, WORK, OR FROM GETTING THINGS NEEDED FOR DAILY LIVING?: YES

## 2020-06-23 SDOH — SOCIAL STABILITY: SOCIAL INSECURITY: WITHIN THE LAST YEAR, HAVE YOU BEEN AFRAID OF YOUR PARTNER OR EX-PARTNER?: NO

## 2020-06-23 SDOH — SOCIAL STABILITY: SOCIAL INSECURITY
WITHIN THE LAST YEAR, HAVE TO BEEN RAPED OR FORCED TO HAVE ANY KIND OF SEXUAL ACTIVITY BY YOUR PARTNER OR EX-PARTNER?: NO

## 2020-06-23 SDOH — SOCIAL STABILITY: SOCIAL NETWORK: HOW OFTEN DO YOU ATTEND CHURCH OR RELIGIOUS SERVICES?: NEVER

## 2020-06-23 SDOH — ECONOMIC STABILITY: INCOME INSECURITY: HOW HARD IS IT FOR YOU TO PAY FOR THE VERY BASICS LIKE FOOD, HOUSING, MEDICAL CARE, AND HEATING?: NOT HARD AT ALL

## 2020-06-23 SDOH — ECONOMIC STABILITY: FOOD INSECURITY: WITHIN THE PAST 12 MONTHS, YOU WORRIED THAT YOUR FOOD WOULD RUN OUT BEFORE YOU GOT MONEY TO BUY MORE.: NEVER TRUE

## 2020-06-23 SDOH — SOCIAL STABILITY: SOCIAL INSECURITY: WITHIN THE LAST YEAR, HAVE YOU BEEN HUMILIATED OR EMOTIONALLY ABUSED IN OTHER WAYS BY YOUR PARTNER OR EX-PARTNER?: NO

## 2020-06-23 SDOH — SOCIAL STABILITY: SOCIAL INSECURITY
WITHIN THE LAST YEAR, HAVE YOU BEEN KICKED, HIT, SLAPPED, OR OTHERWISE PHYSICALLY HURT BY YOUR PARTNER OR EX-PARTNER?: NO

## 2020-06-23 SDOH — SOCIAL STABILITY: SOCIAL NETWORK: ARE YOU MARRIED, WIDOWED, DIVORCED, SEPARATED, NEVER MARRIED, OR LIVING WITH A PARTNER?: MARRIED

## 2020-06-23 SDOH — ECONOMIC STABILITY: FOOD INSECURITY: WITHIN THE PAST 12 MONTHS, THE FOOD YOU BOUGHT JUST DIDN'T LAST AND YOU DIDN'T HAVE MONEY TO GET MORE.: NEVER TRUE

## 2020-06-23 SDOH — SOCIAL STABILITY: SOCIAL NETWORK
DO YOU BELONG TO ANY CLUBS OR ORGANIZATIONS SUCH AS CHURCH GROUPS UNIONS, FRATERNAL OR ATHLETIC GROUPS, OR SCHOOL GROUPS?: NO

## 2020-06-23 SDOH — HEALTH STABILITY: MENTAL HEALTH
STRESS IS WHEN SOMEONE FEELS TENSE, NERVOUS, ANXIOUS, OR CAN'T SLEEP AT NIGHT BECAUSE THEIR MIND IS TROUBLED. HOW STRESSED ARE YOU?: NOT AT ALL

## 2020-06-23 SDOH — ECONOMIC STABILITY: TRANSPORTATION INSECURITY
IN THE PAST 12 MONTHS, HAS THE LACK OF TRANSPORTATION KEPT YOU FROM MEDICAL APPOINTMENTS OR FROM GETTING MEDICATIONS?: YES

## 2020-06-23 SDOH — SOCIAL STABILITY: SOCIAL NETWORK: HOW OFTEN DO YOU GET TOGETHER WITH FRIENDS OR RELATIVES?: MORE THAN THREE TIMES A WEEK

## 2020-06-23 SDOH — HEALTH STABILITY: PHYSICAL HEALTH: ON AVERAGE, HOW MANY MINUTES DO YOU ENGAGE IN EXERCISE AT THIS LEVEL?: 0 MIN

## 2020-06-23 SDOH — SOCIAL STABILITY: SOCIAL NETWORK
IN A TYPICAL WEEK, HOW MANY TIMES DO YOU TALK ON THE PHONE WITH FAMILY, FRIENDS, OR NEIGHBORS?: MORE THAN THREE TIMES A WEEK

## 2020-06-23 SDOH — HEALTH STABILITY: PHYSICAL HEALTH: ON AVERAGE, HOW MANY DAYS PER WEEK DO YOU ENGAGE IN MODERATE TO STRENUOUS EXERCISE (LIKE A BRISK WALK)?: 0 DAYS

## 2020-06-23 SDOH — SOCIAL STABILITY: SOCIAL NETWORK: HOW OFTEN DO YOU ATTENT MEETINGS OF THE CLUB OR ORGANIZATION YOU BELONG TO?: NEVER

## 2020-06-23 ASSESSMENT — ENCOUNTER SYMPTOMS
HEARTBURN: 1
SENSORY CHANGE: 0
DIARRHEA: 0
HEADACHES: 0
SPEECH CHANGE: 0
MYALGIAS: 0
DIZZINESS: 0
VOMITING: 0
BACK PAIN: 0
HEMOPTYSIS: 1
PALPITATIONS: 0
SORE THROAT: 0
BLURRED VISION: 0
NECK PAIN: 0
NERVOUS/ANXIOUS: 1
FEVER: 0
WEAKNESS: 1
DIAPHORESIS: 0
NAUSEA: 0
FLANK PAIN: 0
SHORTNESS OF BREATH: 0
BLOOD IN STOOL: 0
COUGH: 0
FOCAL WEAKNESS: 0
CHILLS: 0
ABDOMINAL PAIN: 0
WHEEZING: 0

## 2020-06-23 ASSESSMENT — LIFESTYLE VARIABLES
TOTAL SCORE: 0
AVERAGE NUMBER OF DAYS PER WEEK YOU HAVE A DRINK CONTAINING ALCOHOL: 0
AVERAGE NUMBER OF DAYS PER WEEK YOU HAVE A DRINK CONTAINING ALCOHOL: 0
CONSUMPTION TOTAL: NEGATIVE
EVER HAD A DRINK FIRST THING IN THE MORNING TO STEADY YOUR NERVES TO GET RID OF A HANGOVER: NO
TOTAL SCORE: 0
EVER FELT BAD OR GUILTY ABOUT YOUR DRINKING: NO
ALCOHOL_USE: NO
HAVE YOU EVER FELT YOU SHOULD CUT DOWN ON YOUR DRINKING: NO
EVER HAD A DRINK FIRST THING IN THE MORNING TO STEADY YOUR NERVES TO GET RID OF A HANGOVER: NO
CONSUMPTION TOTAL: NEGATIVE
HOW MANY TIMES IN THE PAST YEAR HAVE YOU HAD 5 OR MORE DRINKS IN A DAY: 0
ON A TYPICAL DAY WHEN YOU DRINK ALCOHOL HOW MANY DRINKS DO YOU HAVE: 0
HAVE PEOPLE ANNOYED YOU BY CRITICIZING YOUR DRINKING: NO
EVER_SMOKED: YES
TOTAL SCORE: 0
HAVE YOU EVER FELT YOU SHOULD CUT DOWN ON YOUR DRINKING: NO
DOES PATIENT WANT TO STOP DRINKING: NO
ON A TYPICAL DAY WHEN YOU DRINK ALCOHOL HOW MANY DRINKS DO YOU HAVE: 0
EVER FELT BAD OR GUILTY ABOUT YOUR DRINKING: NO
HAVE PEOPLE ANNOYED YOU BY CRITICIZING YOUR DRINKING: NO
DO YOU DRINK ALCOHOL: NO
TOTAL SCORE: 0
HOW MANY TIMES IN THE PAST YEAR HAVE YOU HAD 5 OR MORE DRINKS IN A DAY: 0

## 2020-06-23 ASSESSMENT — COPD QUESTIONNAIRES
IN THE PAST 12 MONTHS DO YOU DO LESS THAN YOU USED TO BECAUSE OF YOUR BREATHING PROBLEMS: DISAGREE/UNSURE
DURING THE PAST 4 WEEKS HOW MUCH DID YOU FEEL SHORT OF BREATH: NONE/LITTLE OF THE TIME
HAVE YOU SMOKED AT LEAST 100 CIGARETTES IN YOUR ENTIRE LIFE: YES
DO YOU EVER COUGH UP ANY MUCUS OR PHLEGM?: YES, A FEW DAYS A WEEK OR MONTH
COPD SCREENING SCORE: 5

## 2020-06-23 ASSESSMENT — PATIENT HEALTH QUESTIONNAIRE - PHQ9
2. FEELING DOWN, DEPRESSED, IRRITABLE, OR HOPELESS: SEVERAL DAYS
3. TROUBLE FALLING OR STAYING ASLEEP OR SLEEPING TOO MUCH: SEVERAL DAYS
SUM OF ALL RESPONSES TO PHQ QUESTIONS 1-9: 5
5. POOR APPETITE OR OVEREATING: NOT AT ALL
7. TROUBLE CONCENTRATING ON THINGS, SUCH AS READING THE NEWSPAPER OR WATCHING TELEVISION: SEVERAL DAYS
4. FEELING TIRED OR HAVING LITTLE ENERGY: SEVERAL DAYS
1. LITTLE INTEREST OR PLEASURE IN DOING THINGS: NOT AT ALL
8. MOVING OR SPEAKING SO SLOWLY THAT OTHER PEOPLE COULD HAVE NOTICED. OR THE OPPOSITE, BEING SO FIGETY OR RESTLESS THAT YOU HAVE BEEN MOVING AROUND A LOT MORE THAN USUAL: NOT AT ALL
9. THOUGHTS THAT YOU WOULD BE BETTER OFF DEAD, OR OF HURTING YOURSELF: NOT AT ALL
SUM OF ALL RESPONSES TO PHQ9 QUESTIONS 1 AND 2: 1
6. FEELING BAD ABOUT YOURSELF - OR THAT YOU ARE A FAILURE OR HAVE LET YOURSELF OR YOUR FAMILY DOWN: SEVERAL DAYS

## 2020-06-23 ASSESSMENT — FIBROSIS 4 INDEX
FIB4 SCORE: 0.61
FIB4 SCORE: 0.73

## 2020-06-23 NOTE — ED TRIAGE NOTES
"Chief Complaint   Patient presents with   • Fatigue     x1 month \"exhaustion\", intermittent chills. Denies fever or contact with anyone ill   • Heartburn     Pt states \"heart burn\" intermittent x1 month     Direct to room for above. In gown, on monitor. Chart up for ERP.   "

## 2020-06-23 NOTE — ED NOTES
PIV established, blood sent to lab. Resting on gurney, given pillow for comfort. Call light within reach.

## 2020-06-23 NOTE — ED PROVIDER NOTES
"ED Provider Note    CHIEF COMPLAINT  Chief Complaint   Patient presents with   • Fatigue     x1 month \"exhaustion\", intermittent chills. Denies fever or contact with anyone ill   • Heartburn     Pt states \"heart burn\" intermittent x1 month       HPI  Anu Manuel is a 65 y.o. female here for evaluation of chest pain and fatigue.  The patient states that she has had some chest tightness and discomfort over the last couple of weeks, this has been intermittent, but worse with deep inspiration.  She has not had any fever chills or vomiting.  The patient states that she has no abdominal pain, and no headache.  She does states she has intermittent radiation to the left shoulder, but no back pain.      ROS  See HPI for further details, o/w negative.     PAST MEDICAL HISTORY   has a past medical history of Adverse effect of anesthesia, Anesthesia, Arthritis, Cigarette smoker (quit 2013), Dental disorder, depression (8/30/2016), Diabetes mellitus type 1 ([facility]) (1989), Encounter for long-term (current) use of insulin (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    SOCIAL HISTORY  Social History     Tobacco Use   • Smoking status: Current Every Day Smoker     Packs/day: 0.50     Years: 30.00     Pack years: 15.00   • Smokeless tobacco: Never Used   • Tobacco comment: 1 ppd    Substance and Sexual Activity   • Alcohol use: No     Comment:     • Drug use: No   • Sexual activity: Not on file       Family History  No bleeding disorders    SURGICAL HISTORY   has a past surgical history that includes hysterectomy, vaginal (2006); thyroid lobectomy (1973); lumpectomy (1976, 2005); cervical disk and fusion anterior (03/12/08); " tonsillectomy (1963); cervical fusion posterior (1/16/2009); hardware removal neuro (1/16/2009); neck exploration (1/16/2009); abdominal hysterectomy total; lumpectomy; lumbar laminectomy diskectomy (Right, 5/10/2016); shoulder decompression arthroscopic (6/17/08); clavicle distal excision (6/17/08); shoulder arthroscopy w/ rotator cuff repair (10/9/08); inj,foramen,l/s,1 level (Right, 8/31/2016); spinal cord stimulator (N/A, 10/26/2018); thoracic laminectomy (N/A, 10/26/2018); appendectomy (2004); implant neurostim/ (N/A, 12/16/2019); irrigation & debridement neuro (1/19/2020); and cath placement (1/25/2020).    CURRENT MEDICATIONS  Home Medications    **Home medications have not yet been reviewed for this encounter**         ALLERGIES  Allergies   Allergen Reactions   • Ativan Unspecified      Extreme Restlessness with whole body  UMA=[id number]   • Tape      Blisters, paper tape is ok       REVIEW OF SYSTEMS  See HPI for further details. Review of systems as above, otherwise all other systems are negative.     PHYSICAL EXAM  Constitutional: Well developed, well nourished. No acute distress.  HEENT: Normocephalic, atraumatic. Posterior pharynx clear and moist.  Eyes:  EOMI. Normal sclera.  Neck: Supple, Full range of motion, nontender.  Chest/Pulmonary: clear to ausculation. Symmetrical expansion.   Cardio: Regular rate and rhythm with no murmur.   Abdomen: Soft, nontender. No peritoneal signs. No guarding. No palpable masses.  Back: No CVA tenderness, nontender midline, no step offs.  Musculoskeletal: No deformity, no edema, neurovascular intact.   Neuro: Clear speech, appropriate, cooperative, cranial nerves II-XII grossly intact.  Psych: Normal mood and affect    Results for orders placed or performed during the hospital encounter of 06/23/20   CBC w/ Differential   Result Value Ref Range    WBC 6.5 4.8 - 10.8 K/uL    RBC 3.81 (L) 4.20 - 5.40 M/uL    Hemoglobin 11.6 (L) 12.0 - 16.0 g/dL    Hematocrit 36.2 (L)  37.0 - 47.0 %    MCV 95.0 81.4 - 97.8 fL    MCH 30.4 27.0 - 33.0 pg    MCHC 32.0 (L) 33.6 - 35.0 g/dL    RDW 47.3 35.9 - 50.0 fL    Platelet Count 293 164 - 446 K/uL    MPV 9.2 9.0 - 12.9 fL    Neutrophils-Polys 62.30 44.00 - 72.00 %    Lymphocytes 21.90 (L) 22.00 - 41.00 %    Monocytes 10.90 0.00 - 13.40 %    Eosinophils 3.50 0.00 - 6.90 %    Basophils 1.10 0.00 - 1.80 %    Immature Granulocytes 0.30 0.00 - 0.90 %    Nucleated RBC 0.00 /100 WBC    Neutrophils (Absolute) 4.06 2.00 - 7.15 K/uL    Lymphs (Absolute) 1.43 1.00 - 4.80 K/uL    Monos (Absolute) 0.71 0.00 - 0.85 K/uL    Eos (Absolute) 0.23 0.00 - 0.51 K/uL    Baso (Absolute) 0.07 0.00 - 0.12 K/uL    Immature Granulocytes (abs) 0.02 0.00 - 0.11 K/uL    NRBC (Absolute) 0.00 K/uL   Complete Metabolic Panel (CMP)   Result Value Ref Range    Sodium 139 135 - 145 mmol/L    Potassium 4.8 3.6 - 5.5 mmol/L    Chloride 104 96 - 112 mmol/L    Co2 24 20 - 33 mmol/L    Anion Gap 11.0 7.0 - 16.0    Glucose 170 (H) 65 - 99 mg/dL    Bun 19 8 - 22 mg/dL    Creatinine 1.20 0.50 - 1.40 mg/dL    Calcium 8.7 8.5 - 10.5 mg/dL    AST(SGOT) 19 12 - 45 U/L    ALT(SGPT) 31 2 - 50 U/L    Alkaline Phosphatase 142 (H) 30 - 99 U/L    Total Bilirubin 0.2 0.1 - 1.5 mg/dL    Albumin 3.8 3.2 - 4.9 g/dL    Total Protein 6.3 6.0 - 8.2 g/dL    Globulin 2.5 1.9 - 3.5 g/dL    A-G Ratio 1.5 g/dL   Troponin STAT   Result Value Ref Range    Troponin T 25 (H) 6 - 19 ng/L   ESTIMATED GFR   Result Value Ref Range    GFR If  55 (A) >60 mL/min/1.73 m 2    GFR If Non African American 45 (A) >60 mL/min/1.73 m 2   EKG   Result Value Ref Range    Report       [facility] Emergency Dept.    Test Date:  2020  Pt Name:    KALPANA BUTTS               Department: ER  MRN:        [id number]                      Room:        01  Gender:     Female                       Technician: [id number]  :        1955                   Requested By:ER TRIAGE PROTOCOL  Order #:     [id number]                    Reading MD:    Measurements  Intervals                                Axis  Rate:       91                           P:          62  NH:         152                          QRS:        30  QRSD:       86                           T:          24  QT:         352  QTc:        434    Interpretive Statements  SINUS RHYTHM  Compared to ECG 01/25/2020 10:58:20  No significant changes       CT-CTA CHEST PULMONARY ARTERY W/ RECONS   Final Result      No evidence of pulmonary embolus.      Coronary artery calcifications.      Stable 6 mm right middle lobe pulmonary nodule.      Low Risk: No routine follow-up      High Risk: Optional CT at 12 months      Comments: Nodules less than 6 mm do not require routine follow-up, but certain patients at high risk with suspicious nodule morphology, upper lobe location, or both may warrant 12-month follow-up.      Low Risk - Minimal or absent history of smoking and of other known risk factors.      High Risk - History of smoking or of other known risk factors.      Note: These recommendations do not apply to lung cancer screening, patients with immunosuppression, or patients with known primary cancer.      Fleischner Society 2017 Guidelines for Management of Incidentally Detected Pulmonary Nodules in Adults      DX-CHEST-PORTABLE (1 VIEW)   Final Result      No acute cardiac or pulmonary abnormalities are identified.        Ekg;  nsr 91. No st elevation, no st depression, qtc 434.      PROCEDURES     MEDICAL RECORD  I have reviewed patient's medical record and pertinent results are listed.    COURSE & MEDICAL DECISION MAKING  I have reviewed any medical record information, laboratory studies and radiographic results as noted above.    4:38 PM  The pt will be admitted to the CDU, for further evaluation and treatment.   The pt is aware of her pulmonary nodule and will follow up as an outpatient     FINAL IMPRESSION  1. Chest pain, unspecified type     2.       Pulmonary nodule.     Electronically signed by: Sean Shepard D.O., 6/23/2020 2:32 PM

## 2020-06-24 ENCOUNTER — APPOINTMENT (OUTPATIENT)
Dept: RADIOLOGY | Facility: MEDICAL CENTER | Age: 65
End: 2020-06-24
Attending: FAMILY MEDICINE
Payer: MEDICARE

## 2020-06-24 VITALS
DIASTOLIC BLOOD PRESSURE: 63 MMHG | OXYGEN SATURATION: 93 % | WEIGHT: 165.34 LBS | SYSTOLIC BLOOD PRESSURE: 123 MMHG | HEART RATE: 100 BPM | BODY MASS INDEX: 26.57 KG/M2 | RESPIRATION RATE: 18 BRPM | TEMPERATURE: 96.8 F | HEIGHT: 66 IN

## 2020-06-24 LAB
ALBUMIN SERPL BCP-MCNC: 3.3 G/DL (ref 3.2–4.9)
ALBUMIN/GLOB SERPL: 1.4 G/DL
ALP SERPL-CCNC: 134 U/L (ref 30–99)
ALT SERPL-CCNC: 29 U/L (ref 2–50)
ANION GAP SERPL CALC-SCNC: 9 MMOL/L (ref 7–16)
AST SERPL-CCNC: 23 U/L (ref 12–45)
BASOPHILS # BLD AUTO: 2.6 % (ref 0–1.8)
BASOPHILS # BLD: 0.14 K/UL (ref 0–0.12)
BILIRUB SERPL-MCNC: <0.2 MG/DL (ref 0.1–1.5)
BUN SERPL-MCNC: 17 MG/DL (ref 8–22)
CALCIUM SERPL-MCNC: 8.2 MG/DL (ref 8.5–10.5)
CHLORIDE SERPL-SCNC: 106 MMOL/L (ref 96–112)
CHOLEST SERPL-MCNC: 92 MG/DL (ref 100–199)
CO2 SERPL-SCNC: 25 MMOL/L (ref 20–33)
CREAT SERPL-MCNC: 1.12 MG/DL (ref 0.5–1.4)
EKG IMPRESSION: NORMAL
EOSINOPHIL # BLD AUTO: 0.36 K/UL (ref 0–0.51)
EOSINOPHIL NFR BLD: 7 % (ref 0–6.9)
ERYTHROCYTE [DISTWIDTH] IN BLOOD BY AUTOMATED COUNT: 47.4 FL (ref 35.9–50)
GLOBULIN SER CALC-MCNC: 2.3 G/DL (ref 1.9–3.5)
GLUCOSE BLD-MCNC: 128 MG/DL (ref 65–99)
GLUCOSE BLD-MCNC: 138 MG/DL (ref 65–99)
GLUCOSE BLD-MCNC: 42 MG/DL (ref 65–99)
GLUCOSE BLD-MCNC: 54 MG/DL (ref 65–99)
GLUCOSE SERPL-MCNC: 61 MG/DL (ref 65–99)
HCT VFR BLD AUTO: 34 % (ref 37–47)
HDLC SERPL-MCNC: 44 MG/DL
HGB BLD-MCNC: 11.2 G/DL (ref 12–16)
LDLC SERPL CALC-MCNC: 28 MG/DL
LV EJECT FRACT MOD 2C 99903: 68.27
LV EJECT FRACT MOD 4C 99902: 45.11
LV EJECT FRACT MOD BP 99901: 57.28
LYMPHOCYTES # BLD AUTO: 1.37 K/UL (ref 1–4.8)
LYMPHOCYTES NFR BLD: 26.3 % (ref 22–41)
MANUAL DIFF BLD: NORMAL
MCH RBC QN AUTO: 31.4 PG (ref 27–33)
MCHC RBC AUTO-ENTMCNC: 32.9 G/DL (ref 33.6–35)
MCV RBC AUTO: 95.2 FL (ref 81.4–97.8)
METAMYELOCYTES NFR BLD MANUAL: 0.9 %
MONOCYTES # BLD AUTO: 0.77 K/UL (ref 0–0.85)
MONOCYTES NFR BLD AUTO: 14.9 % (ref 0–13.4)
MORPHOLOGY BLD-IMP: NORMAL
NEUTROPHILS # BLD AUTO: 2.51 K/UL (ref 2–7.15)
NEUTROPHILS NFR BLD: 47.4 % (ref 44–72)
NEUTS BAND NFR BLD MANUAL: 0.9 % (ref 0–10)
NRBC # BLD AUTO: 0 K/UL
NRBC BLD-RTO: 0 /100 WBC
PLATELET # BLD AUTO: 284 K/UL (ref 164–446)
PLATELET BLD QL SMEAR: NORMAL
PMV BLD AUTO: 9.7 FL (ref 9–12.9)
POTASSIUM SERPL-SCNC: 4.3 MMOL/L (ref 3.6–5.5)
PROT SERPL-MCNC: 5.6 G/DL (ref 6–8.2)
RBC # BLD AUTO: 3.57 M/UL (ref 4.2–5.4)
RBC BLD AUTO: NORMAL
SODIUM SERPL-SCNC: 140 MMOL/L (ref 135–145)
TRIGL SERPL-MCNC: 101 MG/DL (ref 0–149)
TROPONIN T SERPL-MCNC: 19 NG/L (ref 6–19)
WBC # BLD AUTO: 5.2 K/UL (ref 4.8–10.8)

## 2020-06-24 PROCEDURE — 96375 TX/PRO/DX INJ NEW DRUG ADDON: CPT

## 2020-06-24 PROCEDURE — A9270 NON-COVERED ITEM OR SERVICE: HCPCS | Performed by: FAMILY MEDICINE

## 2020-06-24 PROCEDURE — 85007 BL SMEAR W/DIFF WBC COUNT: CPT

## 2020-06-24 PROCEDURE — A9502 TC99M TETROFOSMIN: HCPCS

## 2020-06-24 PROCEDURE — 700102 HCHG RX REV CODE 250 W/ 637 OVERRIDE(OP): Performed by: FAMILY MEDICINE

## 2020-06-24 PROCEDURE — 700101 HCHG RX REV CODE 250: Performed by: FAMILY MEDICINE

## 2020-06-24 PROCEDURE — 80053 COMPREHEN METABOLIC PANEL: CPT

## 2020-06-24 PROCEDURE — 700102 HCHG RX REV CODE 250 W/ 637 OVERRIDE(OP): Performed by: INTERNAL MEDICINE

## 2020-06-24 PROCEDURE — G0378 HOSPITAL OBSERVATION PER HR: HCPCS

## 2020-06-24 PROCEDURE — 85027 COMPLETE CBC AUTOMATED: CPT

## 2020-06-24 PROCEDURE — 82962 GLUCOSE BLOOD TEST: CPT

## 2020-06-24 PROCEDURE — 84484 ASSAY OF TROPONIN QUANT: CPT

## 2020-06-24 PROCEDURE — A9270 NON-COVERED ITEM OR SERVICE: HCPCS | Performed by: INTERNAL MEDICINE

## 2020-06-24 PROCEDURE — 700111 HCHG RX REV CODE 636 W/ 250 OVERRIDE (IP)

## 2020-06-24 PROCEDURE — 99217 PR OBSERVATION CARE DISCHARGE: CPT | Performed by: FAMILY MEDICINE

## 2020-06-24 PROCEDURE — 700111 HCHG RX REV CODE 636 W/ 250 OVERRIDE (IP): Performed by: FAMILY MEDICINE

## 2020-06-24 PROCEDURE — 80061 LIPID PANEL: CPT

## 2020-06-24 RX ORDER — MORPHINE SULFATE 4 MG/ML
1-2 INJECTION, SOLUTION INTRAMUSCULAR; INTRAVENOUS EVERY 4 HOURS PRN
Status: DISCONTINUED | OUTPATIENT
Start: 2020-06-24 | End: 2020-06-24 | Stop reason: HOSPADM

## 2020-06-24 RX ORDER — OMEPRAZOLE 20 MG/1
20 CAPSULE, DELAYED RELEASE ORAL DAILY
Qty: 30 CAP | Refills: 2 | Status: SHIPPED | OUTPATIENT
Start: 2020-06-24 | End: 2020-08-09

## 2020-06-24 RX ORDER — REGADENOSON 0.08 MG/ML
INJECTION, SOLUTION INTRAVENOUS
Status: COMPLETED
Start: 2020-06-24 | End: 2020-06-24

## 2020-06-24 RX ADMIN — REGADENOSON 0.4 MG: 0.08 INJECTION, SOLUTION INTRAVENOUS at 10:31

## 2020-06-24 RX ADMIN — LEVOTHYROXINE SODIUM 200 MCG: 200 TABLET ORAL at 06:47

## 2020-06-24 RX ADMIN — OMEPRAZOLE 20 MG: 20 CAPSULE, DELAYED RELEASE ORAL at 06:47

## 2020-06-24 RX ADMIN — HYDROCODONE BITARTRATE AND ACETAMINOPHEN 2 TABLET: 5; 325 TABLET ORAL at 11:29

## 2020-06-24 RX ADMIN — MORPHINE SULFATE 2 MG: 4 INJECTION INTRAVENOUS at 08:35

## 2020-06-24 RX ADMIN — DEXTROSE MONOHYDRATE 50 ML: 25 INJECTION, SOLUTION INTRAVENOUS at 06:39

## 2020-06-24 RX ADMIN — DEXTROSE MONOHYDRATE 50 ML: 25 INJECTION, SOLUTION INTRAVENOUS at 02:52

## 2020-06-24 RX ADMIN — DOCUSATE SODIUM 50 MG AND SENNOSIDES 8.6 MG 2 TABLET: 8.6; 5 TABLET, FILM COATED ORAL at 06:47

## 2020-06-24 NOTE — CARE PLAN
Problem: Knowledge Deficit  Goal: Knowledge of disease process/condition, treatment plan, diagnostic tests, and medications will improve  Outcome: PROGRESSING AS EXPECTED  Note: Stress test ordered     Problem: Pain Management  Goal: Pain level will decrease to patient's comfort goal  Note: Prn meds per Mar

## 2020-06-24 NOTE — DISCHARGE INSTRUCTIONS
Discharge Instructions    Discharged to home by car with relative. Discharged via wheelchair, hospital escort: Yes.  Special equipment needed: Not Applicable    Be sure to schedule a follow-up appointment with your primary care doctor or any specialists as instructed.     Discharge Plan:   Smoking Cessation Offered: Patient Counseled    I understand that a diet low in cholesterol, fat, and sodium is recommended for good health. Unless I have been given specific instructions below for another diet, I accept this instruction as my diet prescription.   Other diet: Diabetic    Special Instructions:   Follow up with MD    · Is patient discharged on Warfarin / Coumadin?   No     Depression / Suicide Risk    As you are discharged from this [facility] facility, it is important to learn how to keep safe from harming yourself.    Recognize the warning signs:  · Abrupt changes in personality, positive or negative- including increase in energy   · Giving away possessions  · Change in eating patterns- significant weight changes-  positive or negative  · Change in sleeping patterns- unable to sleep or sleeping all the time   · Unwillingness or inability to communicate  · Depression  · Unusual sadness, discouragement and loneliness  · Talk of wanting to die  · Neglect of personal appearance   · Rebelliousness- reckless behavior  · Withdrawal from people/activities they love  · Confusion- inability to concentrate     If you or a loved one observes any of these behaviors or has concerns about self-harm, here's what you can do:  · Talk about it- your feelings and reasons for harming yourself  · Remove any means that you might use to hurt yourself (examples: pills, rope, extension cords, firearm)  · Get professional help from the community (Mental Health, Substance Abuse, psychological counseling)  · Do not be alone:Call your Safe Contact- someone whom you trust who will be there for you.  · Call your local CRISIS HOTLINE 128-6264  or 737-946-6833  · Call your local Children's Mobile Crisis Response Team [place] (200) 039-8155 or www.[employer].LiB  · Call the toll free National Suicide Prevention Hotlines   · National Suicide Prevention Lifeline 693-210-RATW (5151)  · National [employer] Network 800-SUICIDE (146-1716)

## 2020-06-24 NOTE — PROGRESS NOTES
Patient's admission completed. Resting on left side in bed. States that lower back and left hip hurt her due to arthritic pain. Tamra occasionally. States that she wishes that her  could be here. She feels like she is useless anymore. She states that she just wants to get better and go home and be with her family.

## 2020-06-24 NOTE — ED NOTES
Med Rec complete per phone interview with Pt  Allergies Reviewed  No ABX in the last 14 days     Pt takes BRAND NAME Synthroid only

## 2020-06-24 NOTE — H&P
"Hospital Medicine History & Physical Note    Date of Service  6/23/2020    Primary Care Physician  Jarrell Yates M.D.    Code Status  Full Code    Chief Complaint  Chief Complaint   Patient presents with   • Fatigue     x1 month \"exhaustion\", intermittent chills. Denies fever or contact with anyone ill   • Heartburn     Pt states \"heart burn\" intermittent x1 month       History of Presenting Illness  65 y.o. female who presented 6/23/2020 with chest pain.  States she has been having intermittent chest pain for the past 2 weeks, she denies having any diaphoresis, palpitations, shortness of breath.  She denies having any fever or chills, abdominal pain, nausea or vomiting.  She states that for the past month she has also had acid reflux symptoms, she has had a lot of heartburn.  She tried to take Tums but it did not help. This morning she had 2 episodes of cough and noticed that there was blood in it.  CT scan of the chest is negative for pulmonary embolism, it showed a small lung nodule.  Her initial EKG is negative.  Troponin is only slightly elevated at 25.    Review of Systems  Review of Systems   Constitutional: Negative for chills, diaphoresis, fever and malaise/fatigue.   HENT: Negative for congestion, hearing loss and sore throat.    Eyes: Negative for blurred vision.   Respiratory: Positive for hemoptysis. Negative for cough, shortness of breath and wheezing.    Cardiovascular: Positive for chest pain. Negative for palpitations and leg swelling.   Gastrointestinal: Positive for heartburn. Negative for abdominal pain, blood in stool, diarrhea, nausea and vomiting.   Genitourinary: Negative for dysuria, flank pain and hematuria.   Musculoskeletal: Negative for back pain, joint pain, myalgias and neck pain.   Skin: Negative for rash.   Neurological: Positive for weakness. Negative for dizziness, sensory change, speech change, focal weakness and headaches.   Psychiatric/Behavioral: The patient is " nervous/anxious.        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    Surgical History   has a past surgical history that includes hysterectomy, vaginal (2006); thyroid lobectomy (1973); lumpectomy (1976, 2005); cervical disk and fusion anterior (03/12/08); tonsillectomy (1963); cervical fusion posterior (1/16/2009); hardware removal neuro (1/16/2009); neck exploration (1/16/2009); abdominal hysterectomy total; lumpectomy; lumbar laminectomy diskectomy (Right, 5/10/2016); shoulder decompression arthroscopic (6/17/08); clavicle distal excision (6/17/08); shoulder arthroscopy w/ rotator cuff repair (10/9/08); pr inj,foramen,l/s,1 level (Right, 8/31/2016); spinal cord stimulator (N/A, 10/26/2018); thoracic laminectomy (N/A, 10/26/2018); appendectomy (2004); pr implant neurostim/ (N/A, 12/16/2019); irrigation & debridement neuro (1/19/2020); and cath placement (1/25/2020).     Family History  family history includes Cancer in her father; Hypertension in her mother.     Social History   reports that she has been smoking. She has a 15.00 pack-year smoking history. She has never used smokeless tobacco. She reports that she does not drink alcohol or use drugs.    Allergies  Allergies   Allergen Reactions   • Ativan Unspecified      Extreme Restlessness with whole body  CTM=4230   • Tape      Blisters, paper tape is ok       Medications  Prior to Admission  Medications   Prescriptions Last Dose Informant Patient Reported? Taking?   Biotin 1 MG Cap 6/22/2020 at PM Patient Yes Yes   Sig: Take 1 mg by mouth every evening.   Insulin Degludec (TRESIBA) 100 UNIT/ML Solution 6/22/2020 at PM Patient Yes Yes   Sig: Inject 26 Units as instructed every evening.   amLODIPine (NORVASC) 10 MG Tab 6/22/2020 at PM Patient Yes Yes   Sig: Take 10 mg by mouth every evening.   atorvastatin (LIPITOR) 20 MG Tab 6/22/2020 at PM Patient Yes No   Sig: Take 20 mg by mouth every evening.   insulin aspart (NOVOLOG) 100 UNIT/ML Solution 6/23/2020 at AM Patient Yes No   Sig: Inject 2-10 Units as instructed 3 times a day before meals. 1 units for every 5 g of carbs   AND  Sliding Scale   150 - 200 = 1 units  201 - 250 = 2 units  251 - 300 = 3 units  301 - 350 = 4 units  351 - 400 = 5 units   levothyroxine (SYNTHROID) 200 MCG Tab 6/23/2020 at AM Patient Yes No   Sig: Take 200 mcg by mouth Every morning on an empty stomach. BRAND NAME ONLY   pyridoxine (VITAMIN B-6) 50 MG Tab 6/22/2020 at PM Patient Yes Yes   Sig: Take 50 mg by mouth every evening.   traZODone (DESYREL) 100 MG Tab 6/22/2020 at PM Patient Yes Yes   Sig: Take 100 mg by mouth every evening.   vitamin D (CHOLECALCIFEROL) 1000 Unit (25 mcg) Tab 6/22/2020 at PM Patient Yes Yes   Sig: Take 1,000 Units by mouth every evening.      Facility-Administered Medications: None       Physical Exam  Temp:  [36.6 °C (97.8 °F)] 36.6 °C (97.8 °F)  Pulse:  [82-92] 83  Resp:  [16] 16  BP: (101-134)/(55-65) 130/64  SpO2:  [94 %-100 %] 100 %    Physical Exam  Vitals signs and nursing note reviewed.   HENT:      Head: Normocephalic and atraumatic.      Nose: No congestion.      Mouth/Throat:      Mouth: Mucous membranes are moist.   Eyes:      Conjunctiva/sclera: Conjunctivae normal.      Pupils: Pupils are equal, round, and reactive to light.   Neck:      Musculoskeletal: No muscular tenderness.   Cardiovascular:      Rate and Rhythm: Normal rate and regular  rhythm.   Pulmonary:      Effort: Pulmonary effort is normal.      Breath sounds: Normal breath sounds.   Abdominal:      General: Bowel sounds are normal. There is no distension.      Palpations: Abdomen is soft.      Tenderness: There is no abdominal tenderness. There is no guarding or rebound.   Musculoskeletal:      Right lower leg: No edema.      Left lower leg: No edema.   Lymphadenopathy:      Cervical: No cervical adenopathy.   Skin:     General: Skin is warm and dry.   Neurological:      General: No focal deficit present.      Mental Status: She is alert and oriented to person, place, and time.      Cranial Nerves: No cranial nerve deficit.         Laboratory:  Recent Labs     06/23/20  1430   WBC 6.5   RBC 3.81*   HEMOGLOBIN 11.6*   HEMATOCRIT 36.2*   MCV 95.0   MCH 30.4   MCHC 32.0*   RDW 47.3   PLATELETCT 293   MPV 9.2     Recent Labs     06/23/20  1430   SODIUM 139   POTASSIUM 4.8   CHLORIDE 104   CO2 24   GLUCOSE 170*   BUN 19   CREATININE 1.20   CALCIUM 8.7     Recent Labs     06/23/20  1430   ALTSGPT 31   ASTSGOT 19   ALKPHOSPHAT 142*   TBILIRUBIN 0.2   GLUCOSE 170*         No results for input(s): NTPROBNP in the last 72 hours.      Recent Labs     06/23/20  1430   TROPONINT 25*       Imaging:  CT-CTA CHEST PULMONARY ARTERY W/ RECONS   Final Result      No evidence of pulmonary embolus.      Coronary artery calcifications.      Stable 6 mm right middle lobe pulmonary nodule.      Low Risk: No routine follow-up      High Risk: Optional CT at 12 months      Comments: Nodules less than 6 mm do not require routine follow-up, but certain patients at high risk with suspicious nodule morphology, upper lobe location, or both may warrant 12-month follow-up.      Low Risk - Minimal or absent history of smoking and of other known risk factors.      High Risk - History of smoking or of other known risk factors.      Note: These recommendations do not apply to lung cancer screening, patients with  immunosuppression, or patients with known primary cancer.      Fleischner Society 2017 Guidelines for Management of Incidentally Detected Pulmonary Nodules in Adults      DX-CHEST-PORTABLE (1 VIEW)   Final Result      No acute cardiac or pulmonary abnormalities are identified.      EC-ECHOCARDIOGRAM COMPLETE W/O CONT    (Results Pending)   NM-CARDIAC STRESS TEST    (Results Pending)         Assessment/Plan:    * Chest pain- (present on admission)  Assessment & Plan  Serial troponin  Check Stress test and Echocardiogram    Hemoptysis- (present on admission)  Assessment & Plan  Hematemesis?  Observe for now    Acid reflux- (present on admission)  Assessment & Plan  Start Omeprazole    Lung nodule- (present on admission)  Assessment & Plan  Will need follow up as outpatient    CKD (chronic kidney disease) stage 3, GFR 30-59 ml/min (HCC)- (present on admission)  Assessment & Plan  Follow bmp    Hypertension- (present on admission)  Assessment & Plan  continue Norvasc  IV Hydralazine as needed with parameters    Type 1 diabetes mellitus with kidney complication (HCC)- (present on admission)  Assessment & Plan  SSI for now    Dyslipidemia- (present on admission)  Assessment & Plan  Continue Lipitor    Tobacco abuse- (present on admission)  Assessment & Plan  Nicotine replacement protocol    Hypothyroidism, postsurgical- (present on admission)  Assessment & Plan  Continue Synthroid

## 2020-06-24 NOTE — PROGRESS NOTES
FSBS - 54 after 193 earlier. 26 units of lantus given earlier. Dextrose 50% given IV now. Asymptomatic except for being very sleepy. V.S. stable.

## 2020-06-24 NOTE — PROGRESS NOTES
Received in bed, c/o back pain, iv morphine given as ordered, POC discussed, to stress test via wheelchair and got anxious, wants to go home, MD aware, needs attended.

## 2020-06-24 NOTE — DISCHARGE SUMMARY
"Discharge Summary    CHIEF COMPLAINT ON ADMISSION  Chief Complaint   Patient presents with   • Fatigue     x1 month \"exhaustion\", intermittent chills. Denies fever or contact with anyone ill   • Heartburn     Pt states \"heart burn\" intermittent x1 month       Reason for Admission  OTHER     Admission Date  6/23/2020    CODE STATUS  Full Code    HPI & HOSPITAL COURSE  This is a 65 y.o. female here with chest pain and acid reflux symptoms.  For chest pain EKG serial troponins were negative.  Patient had a stress test done which was negative as well.  Echocardiogram showed ejection fraction of 55%.  There was also question of whether she had hemoptysis or hematemesis of one episode when she came in.  Was started on omeprazole for acid reflux symptoms.  There was no recurrence of hemoptysis or hematemesis.  Her hemoglobin was quite stable.  Chest pain has resolved.  We will discharge on omeprazole, she was advised that if she has recurrence of hemoptysis or hematemesis or persistence of acid reflux symptoms she should follow-up here in emergency room or follow-up with her PCP for referral to gastroenterology.       Therefore, she is discharged in good and stable condition to home with close outpatient follow-up.    The patient recovered much more quickly than anticipated on admission.    Discharge Date  6/24/2020    FOLLOW UP ITEMS POST DISCHARGE  Follow up with PCP    DISCHARGE DIAGNOSES  Principal Problem:    Chest pain POA: Yes  Active Problems:    Acid reflux POA: Yes    Hemoptysis POA: Yes    Type 1 diabetes mellitus with kidney complication (HCC) POA: Yes    Hypertension POA: Yes    CKD (chronic kidney disease) stage 3, GFR 30-59 ml/min ([facility]) POA: Yes    Lung nodule POA: Yes    Hypothyroidism, postsurgical POA: Yes    Tobacco abuse POA: Yes    Dyslipidemia POA: Yes  Resolved Problems:    * No resolved hospital problems. *      FOLLOW UP  Future Appointments   Date Time Provider Department Center   6/30/2020 10:30 " AM Fadi Najjar, M.D. [facility] [address] [place] [address]     Jarrell Yates M.D.  [address] [facility]  [address]  [facility] [address]  [phone]            MEDICATIONS ON DISCHARGE     Medication List      START taking these medications      Instructions   omeprazole 20 MG delayed-release capsule  Commonly known as:  PRILOSEC   Take 1 Cap by mouth every day.  Dose:  20 mg        CONTINUE taking these medications      Instructions   amLODIPine 10 MG Tabs  Commonly known as:  NORVASC   Take 10 mg by mouth every evening.  Dose:  10 mg     atorvastatin 20 MG Tabs  Commonly known as:  LIPITOR   Take 20 mg by mouth every evening.  Dose:  20 mg     Biotin 1 MG Caps   Take 1 mg by mouth every evening.  Dose:  1 mg     levothyroxine 200 MCG Tabs  Commonly known as:  SYNTHROID   Take 200 mcg by mouth Every morning on an empty stomach. BRAND NAME ONLY  Dose:  200 mcg     NovoLOG 100 UNIT/ML Soln  Generic drug:  insulin aspart   Inject 2-10 Units as instructed 3 times a day before meals. 1 units for every 5 g of carbs   AND  Sliding Scale   150 - 200 = 1 units  201 - 250 = 2 units  251 - 300 = 3 units  301 - 350 = 4 units  351 - 400 = 5 units  Dose:  2-10 Units     pyridoxine 50 MG Tabs  Commonly known as:  VITAMIN B-6   Take 50 mg by mouth every evening.  Dose:  50 mg     traZODone 100 MG Tabs  Commonly known as:  DESYREL   Take 100 mg by mouth every evening.  Dose:  100 mg     Tresiba 100 UNIT/ML Soln  Generic drug:  Insulin Degludec   Inject 26 Units as instructed every evening.  Dose:  26 Units     vitamin D 1000 Unit (25 mcg) Tabs  Commonly known as:  cholecalciferol   Take 1,000 Units by mouth every evening.  Dose:  1,000 Units            Allergies  Allergies   Allergen Reactions   • Ativan Unspecified      Extreme Restlessness with whole body  PZP=7912   • Tape      Blisters, paper tape is ok       DIET  Orders Placed This Encounter   Procedures   • Diet Order Diabetic     Standing Status:   Standing     Number of Occurrences:   1     Order  Specific Question:   Diet:     Answer:   Diabetic [3]     Order Specific Question:   Miscellaneous modifications:     Answer:   No Decaf, No Caffeine(for test) [11]       ACTIVITY  As tolerated.  Weight bearing as tolerated    CONSULTATIONS  None    PROCEDURES  None    LABORATORY  Lab Results   Component Value Date    SODIUM 140 06/24/2020    POTASSIUM 4.3 06/24/2020    CHLORIDE 106 06/24/2020    CO2 25 06/24/2020    GLUCOSE 61 (L) 06/24/2020    BUN 17 06/24/2020    CREATININE 1.12 06/24/2020    CREATININE 1.0 01/08/2009        Lab Results   Component Value Date    WBC 5.2 06/24/2020    HEMOGLOBIN 11.2 (L) 06/24/2020    HEMATOCRIT 34.0 (L) 06/24/2020    PLATELETCT 284 06/24/2020        Total time of the discharge process exceeds 30 minutes.

## 2020-06-24 NOTE — PROGRESS NOTES
Patient let me know this morning that she did have a couple of episodes of hemoptysis yesterday before admission. Only 1 episode of coughing during the night with no blood noted in sputum.

## 2020-06-30 ENCOUNTER — TELEMEDICINE (OUTPATIENT)
Dept: NEPHROLOGY | Facility: MEDICAL CENTER | Age: 65
End: 2020-06-30
Payer: MEDICARE

## 2020-06-30 DIAGNOSIS — K92.1 MELENA: ICD-10-CM

## 2020-06-30 DIAGNOSIS — I10 ESSENTIAL HYPERTENSION: ICD-10-CM

## 2020-06-30 DIAGNOSIS — N17.9 AKI (ACUTE KIDNEY INJURY) (HCC): ICD-10-CM

## 2020-06-30 PROCEDURE — 99214 OFFICE O/P EST MOD 30 MIN: CPT | Mod: 95,CR | Performed by: INTERNAL MEDICINE

## 2020-06-30 NOTE — PROGRESS NOTES
Telemedicine Visit: Established Patient     This encounter was conducted via Zoom .   Verbal consent was obtained. Patient's identity was verified.    Subjective:   CC: CKD  Anu Manuel is a 65 y.o. female presenting for evaluation and management of: Chronic kidney disease, she developed acute kidney injury secondary to acute tubal necrosis required short-term dialysis, patient kidney function has recovered, she has no hematuria no dysuria.  She is complaining of melena and diarrhea    ROS   Denies any recent fevers or chills. No nausea or vomiting. No chest pains or shortness of breath.     Allergies   Allergen Reactions   • Ativan Unspecified      Extreme Restlessness with whole body  ATN=4430   • Tape      Blisters, paper tape is ok       Current medicines (including changes today)  Current Outpatient Medications   Medication Sig Dispense Refill   • omeprazole (PRILOSEC) 20 MG delayed-release capsule Take 1 Cap by mouth every day. 30 Cap 2   • amLODIPine (NORVASC) 10 MG Tab Take 10 mg by mouth every evening.     • pyridoxine (VITAMIN B-6) 50 MG Tab Take 50 mg by mouth every evening.     • vitamin D (CHOLECALCIFEROL) 1000 Unit (25 mcg) Tab Take 1,000 Units by mouth every evening.     • Biotin 1 MG Cap Take 1 mg by mouth every evening.     • traZODone (DESYREL) 100 MG Tab Take 100 mg by mouth every evening.     • Insulin Degludec (TRESIBA) 100 UNIT/ML Solution Inject 26 Units as instructed every evening.     • atorvastatin (LIPITOR) 20 MG Tab Take 20 mg by mouth every evening.     • insulin aspart (NOVOLOG) 100 UNIT/ML Solution Inject 2-10 Units as instructed 3 times a day before meals. 1 units for every 5 g of carbs   AND  Sliding Scale   150 - 200 = 1 units  201 - 250 = 2 units  251 - 300 = 3 units  301 - 350 = 4 units  351 - 400 = 5 units     • levothyroxine (SYNTHROID) 200 MCG Tab Take 200 mcg by mouth Every morning on an empty stomach. BRAND NAME ONLY       No current facility-administered  medications for this visit.        Patient Active Problem List    Diagnosis Date Noted   • Acid reflux 06/23/2020     Priority: High   • Hemoptysis 06/23/2020     Priority: High   • Chest pain 01/25/2020     Priority: High   • RHEA (acute kidney injury) ([facility]) 01/21/2020     Priority: High   • Post-operative wound abscess 01/18/2020     Priority: High   • Cellulitis 03/15/2018     Priority: High   • CKD (chronic kidney disease) stage 3, GFR 30-59 ml/min ([facility]) 06/23/2020     Priority: Medium   • Lung nodule 06/23/2020     Priority: Medium   • Hypertension 01/24/2020     Priority: Medium   • Hypoglycemia 01/22/2020     Priority: Medium   • Type 1 diabetes mellitus with neurological manifestations, uncontrolled ([facility]) 06/10/2015     Priority: Medium   • Type 1 diabetes mellitus with kidney complication ([facility])      Priority: Medium   • Dyslipidemia 06/23/2020     Priority: Low   • Tobacco abuse 01/18/2020     Priority: Low   • Hypothyroidism, postsurgical      Priority: Low   • Anxiety 01/25/2020   • Nausea & vomiting 01/25/2020   • Chronic pain of right lower extremity 03/15/2018   • Acute left lumbar radiculopathy 08/31/2016   • Lumbar spinal stenosis 05/10/2016   • Diabetic polyneuropathy (CMS-HCC) 06/10/2015   • Vertigo 04/08/2015   • Encounter for long-term (current) use of insulin ([facility]) 09/25/2013   • Accidental drug overdose 08/27/2012   • Vitamin d deficiency 03/14/2012   • Cigarette smoker        Family History   Problem Relation Age of Onset   • Hypertension Mother    • Cancer Father        She  has a past medical history of Adverse effect of anesthesia, Anesthesia, Arthritis, Cigarette smoker (quit 2013), Dental disorder, depression (8/30/2016), Diabetes mellitus type 1 ([facility]) (1989), Encounter for long-term (current) use of insulin ([facility]) (9/25/2013), Heart burn, High cholesterol, Hypertension, Hypothyroidism, postsurgical (1970), Indigestion, Infectious disease, Joint replacement, Pain, Polyneuropathy in  "diabetes(357.2) (6/10/2015), Status post appendectomy, and 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  She  has a past surgical history that includes hysterectomy, vaginal (2006); thyroid lobectomy (1973); lumpectomy (1976, 2005); cervical disk and fusion anterior (03/12/08); tonsillectomy (1963); cervical fusion posterior (1/16/2009); hardware removal neuro (1/16/2009); neck exploration (1/16/2009); abdominal hysterectomy total; lumpectomy; lumbar laminectomy diskectomy (Right, 5/10/2016); shoulder decompression arthroscopic (6/17/08); clavicle distal excision (6/17/08); shoulder arthroscopy w/ rotator cuff repair (10/9/08); pr inj,foramen,l/s,1 level (Right, 8/31/2016); spinal cord stimulator (N/A, 10/26/2018); thoracic laminectomy (N/A, 10/26/2018); appendectomy (2004); pr implant neurostim/ (N/A, 12/16/2019); irrigation & debridement neuro (1/19/2020); and cath placement (1/25/2020).       Objective:   LMP 04/29/2005 (LMP Unknown)     Physical Exam:  Constitutional: Alert, no distress, well-groomed.  Skin: No rashes in visible areas.  Eye: Round. Conjunctiva clear, lids normal. No icterus.   ENMT: Lips pink without lesions, good dentition, moist mucous membranes. Phonation normal.  Neck: No masses, no thyromegaly. Moves freely without pain.  CV: Pulse as reported by patient  Respiratory: Unlabored respiratory effort, no cough or audible wheeze  Psych: Alert and oriented x3, normal affect and mood.   Past Medical History:   Diagnosis Date   • Adverse effect of anesthesia     in 2008 \"throat closes up\"\"anxiety\" ?laryngospasm, kept in ICU. Pt states no problems currently 2018.    • Anesthesia     in 2008 \"throat closes up\"\"anxiety\" ?laryngospasm, kept in ICU. Pt states no problems currently 2018.    • Arthritis     right shoulder, hands   • Cigarette smoker quit 2013   • Dental disorder     missing teeth    • depression 8/30/2016    denies " "depression, states has anxiety and panic attacks   • Diabetes mellitus type 1 (HCC) 1989    insulin   • Encounter for long-term (current) use of insulin ([facility]) 9/25/2013   • Heart burn    • High cholesterol    • Hypertension    • Hypothyroidism, postsurgical 1970   • Indigestion    • Infectious disease      had hepatitis C, tested neg.   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.63) 6/10/2015     Social History     Socioeconomic History   • Marital status:      Spouse name: Not on file   • Number of children: Not on file   • Years of education: Not on file   • Highest education level: Not on file   Occupational History   • Not on file   Social Needs   • Financial resource strain: Not hard at all   • Food insecurity     Worry: Never true     Inability: Never true   • Transportation needs     Medical: Yes     Non-medical: Yes   Tobacco Use   • Smoking status: Current Every Day Smoker     Packs/day: 0.50     Years: 30.00     Pack years: 15.00     Types: Cigarettes   • Smokeless tobacco: Never Used   • Tobacco comment: 1 ppd    Substance and Sexual Activity   • Alcohol use: No     Comment:     • Drug use: No   • Sexual activity: Not on file   Lifestyle   • Physical activity     Days per week: 0 days     Minutes per session: 0 min   • Stress: Not at all   Relationships   • Social connections     Talks on phone: More than three times a week     Gets together: More than three times a week     Attends Islam service: Never     Active member of club or organization: No     Attends meetings of clubs or organizations: Never     Relationship status:    • Intimate partner violence     Fear of current or ex partner: No     Emotionally abused: No     Physically abused: No     Forced sexual " activity: No   Other Topics Concern   • Not on file   Social History Narrative   • Not on file     Family History   Problem Relation Age of Onset   • Hypertension Mother    • Cancer Father      Recent Labs     12/26/19  0934  03/27/20  1022 03/27/20  1047 06/23/20  1430 06/24/20  0250   ALBUMIN 3.7   < > 4.0  --  3.8 3.3   HDL 52  --   --  58  --  44   TRIGLYCERIDE 79  --   --  94  --  101   SODIUM 143   < > 140  --  139 140   POTASSIUM 4.6   < > 3.9  --  4.8 4.3   CHLORIDE 105   < > 103  --  104 106   CO2 29   < > 26  --  24 25   BUN 20   < > 18  --  19 17   CREATININE 0.93   < > 1.30  --  1.20 1.12    < > = values in this interval not displayed.         Assessment and Plan:   The following treatment plan was discussed:     1. RHEA (acute kidney injury) (HCC)  Recovered  Creatinine is stable  No uremic symptoms  Renal dose of medication  Avoid nephrotoxins  Continue same medication regimen  Check labs annually  2. Essential hypertension  Controlled  Continue same medication regimen  Continue low-sodium diet      3. Melena  - CBC WITH DIFFERENTIAL; Future  Patient was advised to contact her primary care provider or get evaluated urgent care to rule out GI bleed    Follow-up: 1 year

## 2020-07-08 ENCOUNTER — HOSPITAL ENCOUNTER (OUTPATIENT)
Dept: RADIOLOGY | Facility: MEDICAL CENTER | Age: 65
End: 2020-07-08
Attending: NURSE PRACTITIONER
Payer: MEDICARE

## 2020-07-08 ENCOUNTER — HOSPITAL ENCOUNTER (OUTPATIENT)
Dept: LAB | Facility: MEDICAL CENTER | Age: 65
End: 2020-07-08
Attending: INTERNAL MEDICINE
Payer: MEDICARE

## 2020-07-08 DIAGNOSIS — R10.9 STOMACH ACHE: ICD-10-CM

## 2020-07-08 DIAGNOSIS — M54.50 LOW BACK PAIN, UNSPECIFIED BACK PAIN LATERALITY, UNSPECIFIED CHRONICITY, UNSPECIFIED WHETHER SCIATICA PRESENT: ICD-10-CM

## 2020-07-08 LAB
BASOPHILS # BLD AUTO: 1.4 % (ref 0–1.8)
BASOPHILS # BLD: 0.11 K/UL (ref 0–0.12)
EOSINOPHIL # BLD AUTO: 0.29 K/UL (ref 0–0.51)
EOSINOPHIL NFR BLD: 3.6 % (ref 0–6.9)
ERYTHROCYTE [DISTWIDTH] IN BLOOD BY AUTOMATED COUNT: 46.3 FL (ref 35.9–50)
FERRITIN SERPL-MCNC: 55.1 NG/ML (ref 10–291)
HCT VFR BLD AUTO: 38.1 % (ref 37–47)
HGB BLD-MCNC: 12.2 G/DL (ref 12–16)
IMM GRANULOCYTES # BLD AUTO: 0.02 K/UL (ref 0–0.11)
IMM GRANULOCYTES NFR BLD AUTO: 0.3 % (ref 0–0.9)
IRON SATN MFR SERPL: 16 % (ref 15–55)
IRON SERPL-MCNC: 40 UG/DL (ref 40–170)
LYMPHOCYTES # BLD AUTO: 1.64 K/UL (ref 1–4.8)
LYMPHOCYTES NFR BLD: 20.6 % (ref 22–41)
MCH RBC QN AUTO: 30.3 PG (ref 27–33)
MCHC RBC AUTO-ENTMCNC: 32 G/DL (ref 33.6–35)
MCV RBC AUTO: 94.5 FL (ref 81.4–97.8)
MONOCYTES # BLD AUTO: 0.85 K/UL (ref 0–0.85)
MONOCYTES NFR BLD AUTO: 10.7 % (ref 0–13.4)
NEUTROPHILS # BLD AUTO: 5.06 K/UL (ref 2–7.15)
NEUTROPHILS NFR BLD: 63.4 % (ref 44–72)
NRBC # BLD AUTO: 0 K/UL
NRBC BLD-RTO: 0 /100 WBC
PLATELET # BLD AUTO: 354 K/UL (ref 164–446)
PMV BLD AUTO: 9.8 FL (ref 9–12.9)
RBC # BLD AUTO: 4.03 M/UL (ref 4.2–5.4)
TIBC SERPL-MCNC: 247 UG/DL (ref 250–450)
TRANSFERRIN SERPL-MCNC: 202 MG/DL (ref 200–370)
UIBC SERPL-MCNC: 207 UG/DL (ref 110–370)
WBC # BLD AUTO: 8 K/UL (ref 4.8–10.8)

## 2020-07-08 PROCEDURE — 84466 ASSAY OF TRANSFERRIN: CPT

## 2020-07-08 PROCEDURE — 85025 COMPLETE CBC W/AUTO DIFF WBC: CPT

## 2020-07-08 PROCEDURE — 36415 COLL VENOUS BLD VENIPUNCTURE: CPT

## 2020-07-08 PROCEDURE — 83550 IRON BINDING TEST: CPT

## 2020-07-08 PROCEDURE — 82728 ASSAY OF FERRITIN: CPT

## 2020-07-08 PROCEDURE — 74177 CT ABD & PELVIS W/CONTRAST: CPT

## 2020-07-08 PROCEDURE — 83540 ASSAY OF IRON: CPT

## 2020-07-08 PROCEDURE — 700117 HCHG RX CONTRAST REV CODE 255: Performed by: NURSE PRACTITIONER

## 2020-07-08 RX ADMIN — IOHEXOL 100 ML: 350 INJECTION, SOLUTION INTRAVENOUS at 14:30

## 2020-07-08 RX ADMIN — IOHEXOL 25 ML: 240 INJECTION, SOLUTION INTRATHECAL; INTRAVASCULAR; INTRAVENOUS; ORAL at 14:30

## 2020-07-11 ENCOUNTER — HOSPITAL ENCOUNTER (OUTPATIENT)
Dept: LAB | Facility: MEDICAL CENTER | Age: 65
End: 2020-07-11
Attending: NURSE PRACTITIONER
Payer: MEDICARE

## 2020-07-11 ENCOUNTER — HOSPITAL ENCOUNTER (OUTPATIENT)
Facility: MEDICAL CENTER | Age: 65
End: 2020-07-11
Attending: NURSE PRACTITIONER
Payer: MEDICARE

## 2020-07-11 PROCEDURE — 87046 STOOL CULTR AEROBIC BACT EA: CPT

## 2020-07-11 PROCEDURE — 87329 GIARDIA AG IA: CPT

## 2020-07-11 PROCEDURE — 87045 FECES CULTURE AEROBIC BACT: CPT

## 2020-07-11 PROCEDURE — 87328 CRYPTOSPORIDIUM AG IA: CPT

## 2020-07-11 PROCEDURE — 87899 AGENT NOS ASSAY W/OPTIC: CPT

## 2020-07-11 PROCEDURE — 82272 OCCULT BLD FECES 1-3 TESTS: CPT

## 2020-07-13 LAB
G LAMBLIA+C PARVUM AG STL QL RAPID: NORMAL
SIGNIFICANT IND 70042: NORMAL
SITE SITE: NORMAL
SOURCE SOURCE: NORMAL

## 2020-07-14 LAB
E COLI SXT1+2 STL IA: NORMAL
SIGNIFICANT IND 70042: NORMAL
SITE SITE: NORMAL
SOURCE SOURCE: NORMAL

## 2020-07-16 LAB
BACTERIA STL CULT: NORMAL
E COLI SXT1+2 STL IA: NORMAL
SIGNIFICANT IND 70042: NORMAL
SITE SITE: NORMAL
SOURCE SOURCE: NORMAL

## 2020-07-18 LAB — HEMOCCULT STL QL: NEGATIVE

## 2020-07-24 ENCOUNTER — PATIENT OUTREACH (OUTPATIENT)
Dept: HEALTH INFORMATION MANAGEMENT | Facility: OTHER | Age: 65
End: 2020-07-24

## 2020-07-24 NOTE — PROGRESS NOTES
A 65-year-old female was an emergent admission to [facility] from 6/23/2020 to 6/24/2020 to treat Chest pain, unspecified. The Patient Navigator did not visit the patient bedside. The patient was discharged home. The patient's medical condition included: Digestive System Disorder (Gall Bladder, GI bleed, stomach ulcer). The patient was not under clinical case management.    The patient was ordered to start/continue to take the following medication: Omeprazole. The patient successfully filled all medications.    The patient was ordered to follow-up with PCP and Specialist. The patient had the following appointments:     1) 6/30/2020 @ 10:30 Najjar, Fadi, physician/nephrology - CONFIRMED AS KEPT     2) 7/13/2020 @ 1:00 Jarrell Yates, physician/internal medicine - CONFIRMED AS KEPT  The patient has no future appointments scheduled.     The Patient Navigator identified that the patient had Healthcare Access barriers. Information Provided to Patient for In-Network Provider (Patient Declined).      The patient was responsive at specific times. Patient Navigator Coordinated with physician on referral, Identified Social Determinants of Health , and Provided education to patient. Patient’s condition or quality of life improved.

## 2020-08-09 ENCOUNTER — APPOINTMENT (OUTPATIENT)
Dept: RADIOLOGY | Facility: MEDICAL CENTER | Age: 65
DRG: 247 | End: 2020-08-09
Attending: EMERGENCY MEDICINE
Payer: MEDICARE

## 2020-08-09 ENCOUNTER — HOSPITAL ENCOUNTER (INPATIENT)
Facility: MEDICAL CENTER | Age: 65
LOS: 1 days | DRG: 247 | End: 2020-08-11
Attending: EMERGENCY MEDICINE | Admitting: HOSPITALIST
Payer: MEDICARE

## 2020-08-09 ENCOUNTER — APPOINTMENT (OUTPATIENT)
Dept: RADIOLOGY | Facility: MEDICAL CENTER | Age: 65
DRG: 247 | End: 2020-08-09
Attending: INTERNAL MEDICINE
Payer: MEDICARE

## 2020-08-09 DIAGNOSIS — R00.2 PALPITATIONS: ICD-10-CM

## 2020-08-09 DIAGNOSIS — R07.9 CHEST PAIN, UNSPECIFIED TYPE: ICD-10-CM

## 2020-08-09 DIAGNOSIS — E10.22 TYPE 1 DIABETES MELLITUS WITH DIABETIC CHRONIC KIDNEY DISEASE, UNSPECIFIED CKD STAGE (HCC): ICD-10-CM

## 2020-08-09 DIAGNOSIS — F41.9 ANXIETY: ICD-10-CM

## 2020-08-09 DIAGNOSIS — I20.0 UNSTABLE ANGINA (HCC): ICD-10-CM

## 2020-08-09 LAB
ALBUMIN SERPL BCP-MCNC: 4.2 G/DL (ref 3.2–4.9)
ALBUMIN/GLOB SERPL: 1.6 G/DL
ALP SERPL-CCNC: 115 U/L (ref 30–99)
ALT SERPL-CCNC: 38 U/L (ref 2–50)
ANION GAP SERPL CALC-SCNC: 14 MMOL/L (ref 7–16)
AST SERPL-CCNC: 23 U/L (ref 12–45)
BASOPHILS # BLD AUTO: 1.6 % (ref 0–1.8)
BASOPHILS # BLD: 0.12 K/UL (ref 0–0.12)
BILIRUB SERPL-MCNC: 0.3 MG/DL (ref 0.1–1.5)
BUN SERPL-MCNC: 26 MG/DL (ref 8–22)
CALCIUM SERPL-MCNC: 9.3 MG/DL (ref 8.5–10.5)
CHLORIDE SERPL-SCNC: 104 MMOL/L (ref 96–112)
CO2 SERPL-SCNC: 21 MMOL/L (ref 20–33)
CREAT SERPL-MCNC: 1 MG/DL (ref 0.5–1.4)
EKG IMPRESSION: NORMAL
EOSINOPHIL # BLD AUTO: 0.17 K/UL (ref 0–0.51)
EOSINOPHIL NFR BLD: 2.3 % (ref 0–6.9)
ERYTHROCYTE [DISTWIDTH] IN BLOOD BY AUTOMATED COUNT: 44.2 FL (ref 35.9–50)
GLOBULIN SER CALC-MCNC: 2.7 G/DL (ref 1.9–3.5)
GLUCOSE BLD-MCNC: 300 MG/DL (ref 65–99)
GLUCOSE SERPL-MCNC: 194 MG/DL (ref 65–99)
HCT VFR BLD AUTO: 39.5 % (ref 37–47)
HGB BLD-MCNC: 13 G/DL (ref 12–16)
IMM GRANULOCYTES # BLD AUTO: 0.01 K/UL (ref 0–0.11)
IMM GRANULOCYTES NFR BLD AUTO: 0.1 % (ref 0–0.9)
LYMPHOCYTES # BLD AUTO: 1.65 K/UL (ref 1–4.8)
LYMPHOCYTES NFR BLD: 22.6 % (ref 22–41)
MAGNESIUM SERPL-MCNC: 2 MG/DL (ref 1.5–2.5)
MCH RBC QN AUTO: 30.7 PG (ref 27–33)
MCHC RBC AUTO-ENTMCNC: 32.9 G/DL (ref 33.6–35)
MCV RBC AUTO: 93.2 FL (ref 81.4–97.8)
MONOCYTES # BLD AUTO: 0.6 K/UL (ref 0–0.85)
MONOCYTES NFR BLD AUTO: 8.2 % (ref 0–13.4)
NEUTROPHILS # BLD AUTO: 4.74 K/UL (ref 2–7.15)
NEUTROPHILS NFR BLD: 65.2 % (ref 44–72)
NRBC # BLD AUTO: 0 K/UL
NRBC BLD-RTO: 0 /100 WBC
PLATELET # BLD AUTO: 304 K/UL (ref 164–446)
PMV BLD AUTO: 9.4 FL (ref 9–12.9)
POTASSIUM SERPL-SCNC: 4.5 MMOL/L (ref 3.6–5.5)
PROT SERPL-MCNC: 6.9 G/DL (ref 6–8.2)
RBC # BLD AUTO: 4.24 M/UL (ref 4.2–5.4)
SODIUM SERPL-SCNC: 139 MMOL/L (ref 135–145)
T4 FREE SERPL-MCNC: 1.65 NG/DL (ref 0.93–1.7)
TROPONIN T SERPL-MCNC: 24 NG/L (ref 6–19)
TROPONIN T SERPL-MCNC: 30 NG/L (ref 6–19)
TROPONIN T SERPL-MCNC: 32 NG/L (ref 6–19)
TSH SERPL DL<=0.005 MIU/L-ACNC: 0.22 UIU/ML (ref 0.38–5.33)
WBC # BLD AUTO: 7.3 K/UL (ref 4.8–10.8)

## 2020-08-09 PROCEDURE — 700102 HCHG RX REV CODE 250 W/ 637 OVERRIDE(OP): Performed by: INTERNAL MEDICINE

## 2020-08-09 PROCEDURE — A9270 NON-COVERED ITEM OR SERVICE: HCPCS | Performed by: HOSPITALIST

## 2020-08-09 PROCEDURE — A9270 NON-COVERED ITEM OR SERVICE: HCPCS | Performed by: EMERGENCY MEDICINE

## 2020-08-09 PROCEDURE — 99223 1ST HOSP IP/OBS HIGH 75: CPT | Performed by: INTERNAL MEDICINE

## 2020-08-09 PROCEDURE — 71045 X-RAY EXAM CHEST 1 VIEW: CPT

## 2020-08-09 PROCEDURE — 96374 THER/PROPH/DIAG INJ IV PUSH: CPT

## 2020-08-09 PROCEDURE — 700105 HCHG RX REV CODE 258: Performed by: EMERGENCY MEDICINE

## 2020-08-09 PROCEDURE — 85025 COMPLETE CBC W/AUTO DIFF WBC: CPT

## 2020-08-09 PROCEDURE — A9270 NON-COVERED ITEM OR SERVICE: HCPCS | Performed by: INTERNAL MEDICINE

## 2020-08-09 PROCEDURE — 700102 HCHG RX REV CODE 250 W/ 637 OVERRIDE(OP): Performed by: EMERGENCY MEDICINE

## 2020-08-09 PROCEDURE — C9803 HOPD COVID-19 SPEC COLLECT: HCPCS | Performed by: INTERNAL MEDICINE

## 2020-08-09 PROCEDURE — 84443 ASSAY THYROID STIM HORMONE: CPT

## 2020-08-09 PROCEDURE — 83735 ASSAY OF MAGNESIUM: CPT

## 2020-08-09 PROCEDURE — 75574 CT ANGIO HRT W/3D IMAGE: CPT | Mod: ME

## 2020-08-09 PROCEDURE — 84484 ASSAY OF TROPONIN QUANT: CPT

## 2020-08-09 PROCEDURE — 80053 COMPREHEN METABOLIC PANEL: CPT

## 2020-08-09 PROCEDURE — 93005 ELECTROCARDIOGRAM TRACING: CPT | Performed by: EMERGENCY MEDICINE

## 2020-08-09 PROCEDURE — G0378 HOSPITAL OBSERVATION PER HR: HCPCS

## 2020-08-09 PROCEDURE — 99285 EMERGENCY DEPT VISIT HI MDM: CPT

## 2020-08-09 PROCEDURE — 99220 PR INITIAL OBSERVATION CARE,LEVL III: CPT | Performed by: HOSPITALIST

## 2020-08-09 PROCEDURE — 96375 TX/PRO/DX INJ NEW DRUG ADDON: CPT

## 2020-08-09 PROCEDURE — 700117 HCHG RX CONTRAST REV CODE 255: Performed by: INTERNAL MEDICINE

## 2020-08-09 PROCEDURE — 700111 HCHG RX REV CODE 636 W/ 250 OVERRIDE (IP): Performed by: EMERGENCY MEDICINE

## 2020-08-09 PROCEDURE — 700102 HCHG RX REV CODE 250 W/ 637 OVERRIDE(OP): Performed by: HOSPITALIST

## 2020-08-09 PROCEDURE — 82962 GLUCOSE BLOOD TEST: CPT

## 2020-08-09 PROCEDURE — 93005 ELECTROCARDIOGRAM TRACING: CPT

## 2020-08-09 PROCEDURE — 36415 COLL VENOUS BLD VENIPUNCTURE: CPT

## 2020-08-09 PROCEDURE — 94760 N-INVAS EAR/PLS OXIMETRY 1: CPT

## 2020-08-09 PROCEDURE — 84439 ASSAY OF FREE THYROXINE: CPT

## 2020-08-09 RX ORDER — NITROGLYCERIN 0.4 MG/1
0.4 TABLET SUBLINGUAL
Status: DISCONTINUED | OUTPATIENT
Start: 2020-08-09 | End: 2020-08-11 | Stop reason: HOSPADM

## 2020-08-09 RX ORDER — AMLODIPINE BESYLATE 10 MG/1
10 TABLET ORAL EVERY EVENING
Status: DISCONTINUED | OUTPATIENT
Start: 2020-08-09 | End: 2020-08-11 | Stop reason: HOSPADM

## 2020-08-09 RX ORDER — INSULIN DEGLUDEC 100 U/ML
25 INJECTION, SOLUTION SUBCUTANEOUS EVERY EVENING
Status: DISCONTINUED | OUTPATIENT
Start: 2020-08-09 | End: 2020-08-09

## 2020-08-09 RX ORDER — ONDANSETRON 4 MG/1
4 TABLET, ORALLY DISINTEGRATING ORAL EVERY 4 HOURS PRN
Status: DISCONTINUED | OUTPATIENT
Start: 2020-08-09 | End: 2020-08-11 | Stop reason: HOSPADM

## 2020-08-09 RX ORDER — SUCRALFATE 1 G/1
1 TABLET ORAL
COMMUNITY
Start: 2020-07-23 | End: 2021-03-02

## 2020-08-09 RX ORDER — ASPIRIN 81 MG/1
324 TABLET, CHEWABLE ORAL DAILY
Status: DISCONTINUED | OUTPATIENT
Start: 2020-08-10 | End: 2020-08-11

## 2020-08-09 RX ORDER — INSULIN GLARGINE 100 [IU]/ML
25 INJECTION, SOLUTION SUBCUTANEOUS EVERY EVENING
Status: DISCONTINUED | OUTPATIENT
Start: 2020-08-09 | End: 2020-08-10

## 2020-08-09 RX ORDER — AMOXICILLIN 250 MG
2 CAPSULE ORAL 2 TIMES DAILY
Status: DISCONTINUED | OUTPATIENT
Start: 2020-08-09 | End: 2020-08-11 | Stop reason: HOSPADM

## 2020-08-09 RX ORDER — TRAZODONE HYDROCHLORIDE 50 MG/1
100 TABLET ORAL
Status: DISCONTINUED | OUTPATIENT
Start: 2020-08-09 | End: 2020-08-11 | Stop reason: HOSPADM

## 2020-08-09 RX ORDER — SUCRALFATE 1 G/1
1 TABLET ORAL
Status: DISCONTINUED | OUTPATIENT
Start: 2020-08-09 | End: 2020-08-11 | Stop reason: HOSPADM

## 2020-08-09 RX ORDER — POLYETHYLENE GLYCOL 3350 17 G/17G
1 POWDER, FOR SOLUTION ORAL
Status: DISCONTINUED | OUTPATIENT
Start: 2020-08-09 | End: 2020-08-11 | Stop reason: HOSPADM

## 2020-08-09 RX ORDER — ACETAMINOPHEN 325 MG/1
650 TABLET ORAL EVERY 6 HOURS PRN
Status: DISCONTINUED | OUTPATIENT
Start: 2020-08-09 | End: 2020-08-11 | Stop reason: HOSPADM

## 2020-08-09 RX ORDER — ASPIRIN 325 MG
325 TABLET ORAL DAILY
Status: DISCONTINUED | OUTPATIENT
Start: 2020-08-10 | End: 2020-08-11

## 2020-08-09 RX ORDER — ATORVASTATIN CALCIUM 20 MG/1
20 TABLET, FILM COATED ORAL NIGHTLY
Status: DISCONTINUED | OUTPATIENT
Start: 2020-08-09 | End: 2020-08-10

## 2020-08-09 RX ORDER — ONDANSETRON 2 MG/ML
4 INJECTION INTRAMUSCULAR; INTRAVENOUS EVERY 4 HOURS PRN
Status: DISCONTINUED | OUTPATIENT
Start: 2020-08-09 | End: 2020-08-11 | Stop reason: HOSPADM

## 2020-08-09 RX ORDER — BISACODYL 10 MG
10 SUPPOSITORY, RECTAL RECTAL
Status: DISCONTINUED | OUTPATIENT
Start: 2020-08-09 | End: 2020-08-11 | Stop reason: HOSPADM

## 2020-08-09 RX ORDER — DEXTROSE MONOHYDRATE 25 G/50ML
50 INJECTION, SOLUTION INTRAVENOUS
Status: DISCONTINUED | OUTPATIENT
Start: 2020-08-09 | End: 2020-08-11 | Stop reason: HOSPADM

## 2020-08-09 RX ORDER — METOPROLOL SUCCINATE 25 MG/1
25 TABLET, EXTENDED RELEASE ORAL ONCE
Status: DISCONTINUED | OUTPATIENT
Start: 2020-08-09 | End: 2020-08-09

## 2020-08-09 RX ORDER — LORAZEPAM 2 MG/ML
0.5 INJECTION INTRAMUSCULAR ONCE
Status: COMPLETED | OUTPATIENT
Start: 2020-08-09 | End: 2020-08-09

## 2020-08-09 RX ORDER — LEVOTHYROXINE SODIUM 0.1 MG/1
200 TABLET ORAL
Status: DISCONTINUED | OUTPATIENT
Start: 2020-08-10 | End: 2020-08-10

## 2020-08-09 RX ORDER — PROMETHAZINE HYDROCHLORIDE 25 MG/1
12.5-25 TABLET ORAL EVERY 4 HOURS PRN
Status: DISCONTINUED | OUTPATIENT
Start: 2020-08-09 | End: 2020-08-11 | Stop reason: HOSPADM

## 2020-08-09 RX ORDER — PROMETHAZINE HYDROCHLORIDE 25 MG/1
12.5-25 SUPPOSITORY RECTAL EVERY 4 HOURS PRN
Status: DISCONTINUED | OUTPATIENT
Start: 2020-08-09 | End: 2020-08-11 | Stop reason: HOSPADM

## 2020-08-09 RX ORDER — SODIUM CHLORIDE 9 MG/ML
1000 INJECTION, SOLUTION INTRAVENOUS ONCE
Status: COMPLETED | OUTPATIENT
Start: 2020-08-09 | End: 2020-08-09

## 2020-08-09 RX ORDER — METOPROLOL TARTRATE 50 MG/1
100 TABLET, FILM COATED ORAL TWICE DAILY
Status: DISCONTINUED | OUTPATIENT
Start: 2020-08-09 | End: 2020-08-11 | Stop reason: HOSPADM

## 2020-08-09 RX ORDER — ALPRAZOLAM 1 MG/1
1 TABLET ORAL ONCE
Status: COMPLETED | OUTPATIENT
Start: 2020-08-09 | End: 2020-08-09

## 2020-08-09 RX ORDER — MESALAMINE 1.2 G/1
2.4 TABLET, DELAYED RELEASE ORAL EVERY EVENING
Status: ON HOLD | COMMUNITY
Start: 2020-07-23 | End: 2020-08-11

## 2020-08-09 RX ORDER — ASPIRIN 300 MG/1
300 SUPPOSITORY RECTAL DAILY
Status: DISCONTINUED | OUTPATIENT
Start: 2020-08-10 | End: 2020-08-11

## 2020-08-09 RX ADMIN — AMLODIPINE BESYLATE 10 MG: 10 TABLET ORAL at 21:59

## 2020-08-09 RX ADMIN — SUCRALFATE 1 G: 1 TABLET ORAL at 21:59

## 2020-08-09 RX ADMIN — TRAZODONE HYDROCHLORIDE 100 MG: 50 TABLET ORAL at 21:59

## 2020-08-09 RX ADMIN — LORAZEPAM 0.5 MG: 2 INJECTION INTRAMUSCULAR; INTRAVENOUS at 13:39

## 2020-08-09 RX ADMIN — SODIUM CHLORIDE 1000 ML: 9 INJECTION, SOLUTION INTRAVENOUS at 15:20

## 2020-08-09 RX ADMIN — INSULIN GLARGINE 25 UNITS: 100 INJECTION, SOLUTION SUBCUTANEOUS at 22:25

## 2020-08-09 RX ADMIN — FENTANYL CITRATE 50 MCG: 50 INJECTION INTRAMUSCULAR; INTRAVENOUS at 13:59

## 2020-08-09 RX ADMIN — METOPROLOL TARTRATE 100 MG: 50 TABLET, FILM COATED ORAL at 16:58

## 2020-08-09 RX ADMIN — NITROGLYCERIN 0.4 MG: 0.4 TABLET, ORALLY DISINTEGRATING SUBLINGUAL at 18:14

## 2020-08-09 RX ADMIN — ALPRAZOLAM 1 MG: 1 TABLET ORAL at 15:20

## 2020-08-09 RX ADMIN — IOHEXOL 75 ML: 350 INJECTION, SOLUTION INTRAVENOUS at 18:51

## 2020-08-09 RX ADMIN — ATORVASTATIN CALCIUM 20 MG: 20 TABLET, FILM COATED ORAL at 21:59

## 2020-08-09 ASSESSMENT — ENCOUNTER SYMPTOMS
HEMOPTYSIS: 0
VOMITING: 0
COUGH: 0
DIZZINESS: 0
MYALGIAS: 0
TINGLING: 0
BRUISES/BLEEDS EASILY: 0
NAUSEA: 0
CHILLS: 0
WHEEZING: 0
FEVER: 0
BLURRED VISION: 0
ABDOMINAL PAIN: 0
PND: 0
CLAUDICATION: 0
BACK PAIN: 0
SINUS PAIN: 0
HEARTBURN: 0
HEADACHES: 0
DEPRESSION: 0
PALPITATIONS: 0
DOUBLE VISION: 0

## 2020-08-09 ASSESSMENT — COPD QUESTIONNAIRES
HAVE YOU SMOKED AT LEAST 100 CIGARETTES IN YOUR ENTIRE LIFE: YES
DURING THE PAST 4 WEEKS HOW MUCH DID YOU FEEL SHORT OF BREATH: SOME OF THE TIME
COPD SCREENING SCORE: 6
DO YOU EVER COUGH UP ANY MUCUS OR PHLEGM?: YES, A FEW DAYS A WEEK OR MONTH

## 2020-08-09 ASSESSMENT — COGNITIVE AND FUNCTIONAL STATUS - GENERAL
MOBILITY SCORE: 24
SUGGESTED CMS G CODE MODIFIER DAILY ACTIVITY: CH
SUGGESTED CMS G CODE MODIFIER MOBILITY: CH
DAILY ACTIVITIY SCORE: 24

## 2020-08-09 ASSESSMENT — LIFESTYLE VARIABLES
SUBSTANCE_ABUSE: 0
EVER_SMOKED: YES

## 2020-08-09 ASSESSMENT — FIBROSIS 4 INDEX
FIB4 SCORE: 0.77
FIB4 SCORE: 0.76

## 2020-08-09 NOTE — ED NOTES
Patient resting on cart, sleeping with easy, unlabored respirs. Patient positioned on cart with pillow and blankets in place, siderailx1, significant other at bedside. Patient remains on monitoring, awaiting CT scan. Will continue to monitor. Pt appears calm, more relaxed at this time. Lights dimmed for comfort.

## 2020-08-09 NOTE — ED TRIAGE NOTES
"PT ambulated to triage c/o \"fluttering\" in her chest and CP since last night. PT reports that she is having \"some life stresses and I'm not coping with them very well\"  EKG completed in triage   Chief Complaint   Patient presents with   • Chest Pain     \"fluttering\" and CP     /87   Pulse (!) 104   Temp 36.2 °C (97.2 °F) (Temporal)   Resp 16   Ht 1.676 m (5' 6\")   Wt 68.7 kg (151 lb 7.3 oz)   SpO2 96%     "

## 2020-08-09 NOTE — ED NOTES
Pt ambulatory to ED room 7 with steady, upright gait without difficulty. Updated on POC & pending ERP evaluation. Verbalizes understanding, changes into hospital gown without difficulty, placed on monitoring.

## 2020-08-09 NOTE — ED NOTES
Assist RN: Pt reported she was feeling much more anxious after ativan administration. She is able able to sit on gurney. RN encouraged her to call spouse. ERP notified. Orders received and pt medicated.

## 2020-08-09 NOTE — ED NOTES
Patient resting on cart, awake, alert, oriented x 4, pt crying on cart, tearful, states she just wants to go home. Patient updated on POC, agreeable to repeat labs. Pt verbalizes understanding. Assessment unchanged. Denies needs or questions, call light within reach, will continue to monitor. Patient remains on monitoring.

## 2020-08-09 NOTE — ED NOTES
Patient resting on cart, HOB elevated. Pt tearful and states she is concerned r/t potential side effects of beta blocker, pt appears very anxious, states she already has multiple symptoms and fears for any worsening r/t medication. Patient educated on ordered medication but refuses. Patient refuses ordered medication at this time. ERP to be notified. Patient ambulates to restroom & back to ED room with steady, upright gait without difficulty. Remains on monitoring.

## 2020-08-09 NOTE — ED NOTES
Patient resting on cart, awake, alert, oriented x 4. Patient updated on POC, verbalizes understanding. Patient appears more relaxed/comfortable, states continues feeling anxious but agreeable to stay for further testing. Denies needs or questions, call light within reach, will continue to monitor. Patient remains on monitoring. Lights dimmed, siderailx1.

## 2020-08-09 NOTE — CONSULTS
Cardiology Consult Note:    Manuel Green M.D.  Date & Time note created:    8/9/2020   4:50 PM     Referring MD:  Dr. Duffy    Patient ID:   Name:             Anu Manuel   YOB: 1955  Age:                 65 y.o.  female   MRN:               [id number]                                                             Reason for Consult:      Chest pain    History of Present Illness:    65-year-old female with a past medical history of hypertension and type 2 diabetes for 30 years.  She had a nuclear stress test recently which was normal.  She completed continues to have chest pain intermittently described sometimes as a pressure-like sensation in her mid chest sometimes worsened by exertion or working on the yard and relieved by rest but can last all day long.  Is also associated with palpitations which occur.  While she was in the ER she developed an 8 beat run of wide-complex tachycardia consistent with VT.  She is under significant amount of stress from her brother who is on hospice for stage IV cancer.    Review of Systems:      Constitutional: Denies fevers, Denies weight changes  Eyes: Denies changes in vision, no eye pain  Ears/Nose/Throat/Mouth: Denies nasal congestion or sore throat   Cardiovascular: + chest pain, + palpitations   Respiratory: no shortness of breath , Denies cough  Gastrointestinal/Hepatic: Denies abdominal pain, nausea, vomiting, diarrhea, constipation or GI bleeding   Genitourinary: Denies dysuria or frequency  Musculoskeletal/Rheum: Denies  joint pain and swelling, no edema  Skin: Denies rash  Neurological: Denies headache, confusion, memory loss or focal weakness/parasthesias  Psychiatric: denies mood disorder   Endocrine: Yamini thyroid problems  Heme/Oncology/Lymph Nodes: Denies enlarged lymph nodes, denies brusing or known bleeding disorder  All other systems were reviewed and are negative (AMA/CMS criteria)                Past Medical History:   Past  "Medical History:   Diagnosis Date   • Adverse effect of anesthesia     in 2008 \"throat closes up\"\"anxiety\" ?laryngospasm, kept in ICU. Pt states no problems currently 2018.    • Anesthesia     in 2008 \"throat closes up\"\"anxiety\" ?laryngospasm, kept in ICU. Pt states no problems currently 2018.    • Arthritis     right shoulder, hands   • Cigarette smoker quit 2013   • Dental disorder     missing teeth    • depression 8/30/2016    denies depression, states has anxiety and panic attacks   • Diabetes mellitus type 1 (HCC) 1989    insulin   • Encounter for long-term (current) use of insulin ([facility]) 9/25/2013   • Heart burn    • High cholesterol    • Hypertension    • Hypothyroidism, postsurgical 1970   • Indigestion    • Infectious disease      had hepatitis C, tested neg.   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      Past Surgical History:  Past Surgical History:   Procedure Laterality Date   • CATH PLACEMENT  1/25/2020    Procedure: INSERTION, CATHETER PERM;  Surgeon: Rola Mendoza M.D.;  Location: [facility];  Service: General   • IRRIGATION & DEBRIDEMENT NEURO  1/19/2020    Procedure: IRRIGATION AND DEBRIDEMENT, WOUND THORACIC AND LUMBAR;  Surgeon: Ryan Roman M.D.;  Location: [facility];  Service: Neurosurgery   • PB IMPLANT NEUROSTIM/ N/A 12/16/2019    Procedure: EXPLORATION AT THORACIC 8 - 9, REPLACEMENT OF  NEUROSTIMULATOR LEAD;  Surgeon: Ryan Roman M.D.;  Location: [facility];  Service: Neurosurgery   • SPINAL CORD STIMULATOR N/A 10/26/2018    Procedure: SPINAL CORD STIMULATOR;  Surgeon: Ryan Roman M.D.;  Location: [facility]; "  Service: Neurosurgery   • THORACIC LAMINECTOMY N/A 10/26/2018    Procedure: THORACIC LAMINECTOMY - FOR;  Surgeon: Ryan Roman M.D.;  Location: SURGERY [facility];  Service: Neurosurgery   • PB INJ,FORAMEN,L/S,1 LEVEL Right 8/31/2016    Procedure: INJ-FORAMEN EPI LUM/SAC SNGL L4-5;  Surgeon: Sukhi Godfrey M.D.;  Location: SURGERY [facility];  Service: Pain Management   • LUMBAR LAMINECTOMY DISKECTOMY Right 5/10/2016    Procedure: LUMBAR L4-5 HEMILAMINECTOMY DISKECTOMY ;  Surgeon: Arnold Keyes M.D.;  Location: SURGERY [facility];  Service:    • CERVICAL FUSION POSTERIOR  1/16/2009    Performed by TARA CONTRERAS at [facility]   • HARDWARE REMOVAL NEURO  1/16/2009    Performed by TARA CONTRERAS at [facility]   • NECK EXPLORATION  1/16/2009    Performed by TARA CONTRERAS at SURGERY [facility]   • SHOULDER ARTHROSCOPY W/ ROTATOR CUFF REPAIR  10/9/08    Performed by SHERLY CASTANEDA at [facility]   • SHOULDER DECOMPRESSION ARTHROSCOPIC  6/17/08    Performed by SHERLY CASTANEDA at [facility]   • CLAVICLE DISTAL EXCISION  6/17/08    Performed by SHERLY CASTANEDA at [facility]   • CERVICAL DISK AND FUSION ANTERIOR  03/12/08   • HYSTERECTOMY, VAGINAL  2006   • APPENDECTOMY  2004   • THYROID LOBECTOMY  1973   • TONSILLECTOMY  1963   • ABDOMINAL HYSTERECTOMY TOTAL     • LUMPECTOMY  1976, 2005    Breast    • LUMPECTOMY         Hospital Medications:  Current Facility-Administered Medications   Medication Dose   • metoprolol (LOPRESSOR) tablet 100 mg  100 mg   • nitroglycerin (NITROSTAT) tablet 0.4 mg  0.4 mg     Current Outpatient Medications   Medication   • mesalamine (LIALDA) 1.2 GM Tablet Delayed Response   • sucralfate (CARAFATE) 1 GM Tab   • amLODIPine (NORVASC) 10 MG Tab   • pyridoxine (VITAMIN B-6) 50 MG Tab   • traZODone (DESYREL) 100 MG Tab   • Insulin Degludec (TRESIBA) 100 UNIT/ML Solution   • atorvastatin (LIPITOR) 20 MG Tab    • insulin aspart (NOVOLOG) 100 UNIT/ML Solution   • levothyroxine (SYNTHROID) 200 MCG Tab         Current Outpatient Medications:  (Not in a hospital admission)      Medication Allergy:  Allergies   Allergen Reactions   • Ativan Unspecified      Extreme Restlessness with whole body  [id number]   • Tape      Blisters, paper tape is ok       Family History:  Family History   Problem Relation Age of Onset   • Hypertension Mother    • Cancer Father        Social History:  Social History     Socioeconomic History   • Marital status:      Spouse name: Not on file   • Number of children: Not on file   • Years of education: Not on file   • Highest education level: Not on file   Occupational History   • Not on file   Social Needs   • Financial resource strain: Not hard at all   • Food insecurity     Worry: Never true     Inability: Never true   • Transportation needs     Medical: Yes     Non-medical: Yes   Tobacco Use   • Smoking status: Current Every Day Smoker     Packs/day: 1.00     Years: 30.00     Pack years: 30.00     Types: Cigarettes   • Smokeless tobacco: Never Used   • Tobacco comment: 1 ppd    Substance and Sexual Activity   • Alcohol use: No     Comment:     • Drug use: No   • Sexual activity: Not on file   Lifestyle   • Physical activity     Days per week: 0 days     Minutes per session: 0 min   • Stress: Not at all   Relationships   • Social connections     Talks on phone: More than three times a week     Gets together: More than three times a week     Attends Restorationism service: Never     Active member of club or organization: No     Attends meetings of clubs or organizations: Never     Relationship status:    • Intimate partner violence     Fear of current or ex partner: No     Emotionally abused: No     Physically abused: No     Forced sexual activity: No   Other Topics Concern   • Not on file   Social History Narrative   • Not on file         Physical Exam:  Vitals/ General Appearance:  "  Weight/BMI: Body mass index is 24.45 kg/m².  /66   Pulse 79   Temp 36.2 °C (97.2 °F) (Temporal)   Resp (!) 23   Ht 1.676 m (5' 6\")   Wt 68.7 kg (151 lb 7.3 oz)   SpO2 95%   Vitals:    20 1606 20 1631 20 1643 20 1646   BP:    122/66   Pulse: 84 81 79    Resp: 13 12 14 (!) 23   Temp:       TempSrc:       SpO2: 94% 90% 90% 95%   Weight:       Height:         Oxygen Therapy:  Pulse Oximetry: 95 %    Constitutional:   Well developed, Well nourished, No acute distress  HENMT:  Normocephalic, Atraumatic, Oropharynx moist mucous membranes, No oral exudates, Nose normal.  No thyromegaly.  Eyes:  EOMI, Conjunctiva normal, No discharge.  Neck:  Normal range of motion, No cervical tenderness,  no JVD.  Cardiovascular:  Normal heart rate, Normal rhythm, No murmurs, No rubs, No gallops.   Extremitites with intact distal pulses, no cyanosis, or edema.  Lungs:  Normal breath sounds, breath sounds clear to auscultation bilaterally,  no crackles, no wheezing.   Abdomen: Bowel sounds normal, Soft, No tenderness, No guarding, No rebound, No masses, No hepatosplenomegaly.  Skin: Warm, Dry, No erythema, No rash, no induration.  Neurologic: Alert & oriented x 3, No focal deficits noted, cranial nerves II through X are grossly intact.  Psychiatric: Affect normal, Judgment normal, Mood normal.      MDM (Data Review):     Records reviewed and summarized in current documentation    Lab Data Review:  Recent Results (from the past 24 hour(s))   EKG (NOW)    Collection Time: 20  9:35 AM   Result Value Ref Range    Report       [facility] Emergency Dept.    Test Date:  2020  Pt Name:    KALPANA BUTTS               Department: ER  MRN:        [id number]                      Room:  Gender:     Female                       Technician: [id number]  :        1955                   Requested By:ER TRIAGE PROTOCOL  Order #:    [id number]                    Reading MD: RHEA BETTS" MD ARMAAN    Measurements  Intervals                                Axis  Rate:       93                           P:          67  MI:         136                          QRS:        49  QRSD:       92                           T:          30  QT:         352  QTc:        438    Interpretive Statements  SINUS RHYTHM  Compared to ECG 06/23/2020 20:23:51  No significant changes  Electronically Signed On 8-9-2020 16:26:40 PDT by RHEA CROOK MD     CBC w/ Differential    Collection Time: 08/09/20 10:18 AM   Result Value Ref Range    WBC 7.3 4.8 - 10.8 K/uL    RBC 4.24 4.20 - 5.40 M/uL    Hemoglobin 13.0 12.0 - 16.0 g/dL    Hematocrit 39.5 37.0 - 47.0 %    MCV 93.2 81.4 - 97.8 fL    MCH 30.7 27.0 - 33.0 pg    MCHC 32.9 (L) 33.6 - 35.0 g/dL    RDW 44.2 35.9 - 50.0 fL    Platelet Count 304 164 - 446 K/uL    MPV 9.4 9.0 - 12.9 fL    Neutrophils-Polys 65.20 44.00 - 72.00 %    Lymphocytes 22.60 22.00 - 41.00 %    Monocytes 8.20 0.00 - 13.40 %    Eosinophils 2.30 0.00 - 6.90 %    Basophils 1.60 0.00 - 1.80 %    Immature Granulocytes 0.10 0.00 - 0.90 %    Nucleated RBC 0.00 /100 WBC    Neutrophils (Absolute) 4.74 2.00 - 7.15 K/uL    Lymphs (Absolute) 1.65 1.00 - 4.80 K/uL    Monos (Absolute) 0.60 0.00 - 0.85 K/uL    Eos (Absolute) 0.17 0.00 - 0.51 K/uL    Baso (Absolute) 0.12 0.00 - 0.12 K/uL    Immature Granulocytes (abs) 0.01 0.00 - 0.11 K/uL    NRBC (Absolute) 0.00 K/uL   Complete Metabolic Panel (CMP)    Collection Time: 08/09/20 10:18 AM   Result Value Ref Range    Sodium 139 135 - 145 mmol/L    Potassium 4.5 3.6 - 5.5 mmol/L    Chloride 104 96 - 112 mmol/L    Co2 21 20 - 33 mmol/L    Anion Gap 14.0 7.0 - 16.0    Glucose 194 (H) 65 - 99 mg/dL    Bun 26 (H) 8 - 22 mg/dL    Creatinine 1.00 0.50 - 1.40 mg/dL    Calcium 9.3 8.5 - 10.5 mg/dL    AST(SGOT) 23 12 - 45 U/L    ALT(SGPT) 38 2 - 50 U/L    Alkaline Phosphatase 115 (H) 30 - 99 U/L    Total Bilirubin 0.3 0.1 - 1.5 mg/dL    Albumin 4.2 3.2 - 4.9 g/dL    Total  Protein 6.9 6.0 - 8.2 g/dL    Globulin 2.7 1.9 - 3.5 g/dL    A-G Ratio 1.6 g/dL   Troponin STAT    Collection Time: 08/09/20 10:18 AM   Result Value Ref Range    Troponin T 30 (H) 6 - 19 ng/L   FREE THYROXINE    Collection Time: 08/09/20 10:18 AM   Result Value Ref Range    Free T-4 1.65 0.93 - 1.70 ng/dL   TSH    Collection Time: 08/09/20 10:18 AM   Result Value Ref Range    TSH 0.222 (L) 0.380 - 5.330 uIU/mL   ESTIMATED GFR    Collection Time: 08/09/20 10:18 AM   Result Value Ref Range    GFR If African American >60 >60 mL/min/1.73 m 2    GFR If Non  56 (A) >60 mL/min/1.73 m 2   Troponin in two (2) hours    Collection Time: 08/09/20 12:08 PM   Result Value Ref Range    Troponin T 32 (H) 6 - 19 ng/L       Imaging/Procedures Review:    Chest Xray:  Reviewed    EKG:   Dated 8/9/2020 personally interpreted by myself showing normal sinus rhythm otherwise normal ECG.    Nuclear stress test: Dated 6/24/2020 personally interpreted by myself showing no ischemia.    ECHO:  Dated 6/24/2025 personally interpreted myself showing no ischemia normal EF no valve disease no pericardial vision.    MDM (Assessment and Plan):     There are no active hospital problems to display for this patient.    65-year-old female with a short run of what appeared to VT in the ER with a recent normal nuclear stress test.  We will get a CTA to rule out ischemia.  If it is negative she can be sent home with event recorder.  If it does show any obstructive disease I discussed with her and she would need to be admitted for possible a cardiac catheterization.    Addendum: Patient had a CTA showing circumflex and LAD disease.  I have discussed the findings with her.  I would recommend a cardiac catheterization in the morning.  She will be n.p.o. after midnight for cath.

## 2020-08-09 NOTE — ED NOTES
Patient is resting on edge of cart. Remains on monitoring. Call light within reach, lights dimmed for comfort. Will continue to monitor.

## 2020-08-09 NOTE — ED NOTES
Patient resting on cart, awake, alert, oriented x 4. Patient updated on POC, verbalizes understanding. Assessment unchanged. Denies needs or questions, call light within reach, will continue to monitor. Patient remains on monitoring.

## 2020-08-09 NOTE — ED NOTES
Patient is resting comfortably on cart, remains on monitoring, appears more relaxed. Lights dimmed, will continue to monitor.
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
## 2020-08-09 NOTE — ED NOTES
Pt medicated per MAR, agreeable to plan, remains on monitoring, positioned on left side on cart for comfort.

## 2020-08-10 ENCOUNTER — APPOINTMENT (OUTPATIENT)
Dept: CARDIOLOGY | Facility: MEDICAL CENTER | Age: 65
DRG: 247 | End: 2020-08-10
Attending: INTERNAL MEDICINE
Payer: MEDICARE

## 2020-08-10 ENCOUNTER — PATIENT OUTREACH (OUTPATIENT)
Dept: HEALTH INFORMATION MANAGEMENT | Facility: OTHER | Age: 65
End: 2020-08-10

## 2020-08-10 PROBLEM — E03.9 HYPOTHYROIDISM IN ADULT: Status: ACTIVE | Noted: 2020-08-10

## 2020-08-10 LAB
ACT BLD: 230 SEC (ref 74–137)
ACT BLD: 268 SEC (ref 74–137)
ACT BLD: 274 SEC (ref 74–137)
ALBUMIN SERPL BCP-MCNC: 3.3 G/DL (ref 3.2–4.9)
ALBUMIN/GLOB SERPL: 1.6 G/DL
ALP SERPL-CCNC: 106 U/L (ref 30–99)
ALT SERPL-CCNC: 43 U/L (ref 2–50)
ANION GAP SERPL CALC-SCNC: 12 MMOL/L (ref 7–16)
AST SERPL-CCNC: 39 U/L (ref 12–45)
BILIRUB SERPL-MCNC: 0.2 MG/DL (ref 0.1–1.5)
BUN SERPL-MCNC: 22 MG/DL (ref 8–22)
CALCIUM SERPL-MCNC: 8.1 MG/DL (ref 8.5–10.5)
CHLORIDE SERPL-SCNC: 109 MMOL/L (ref 96–112)
CHOLEST SERPL-MCNC: 96 MG/DL (ref 100–199)
CO2 SERPL-SCNC: 18 MMOL/L (ref 20–33)
CREAT SERPL-MCNC: 1 MG/DL (ref 0.5–1.4)
ERYTHROCYTE [DISTWIDTH] IN BLOOD BY AUTOMATED COUNT: 45 FL (ref 35.9–50)
GLOBULIN SER CALC-MCNC: 2.1 G/DL (ref 1.9–3.5)
GLUCOSE BLD-MCNC: 133 MG/DL (ref 65–99)
GLUCOSE BLD-MCNC: 155 MG/DL (ref 65–99)
GLUCOSE BLD-MCNC: 169 MG/DL (ref 65–99)
GLUCOSE BLD-MCNC: 270 MG/DL (ref 65–99)
GLUCOSE BLD-MCNC: 448 MG/DL (ref 65–99)
GLUCOSE BLD-MCNC: 63 MG/DL (ref 65–99)
GLUCOSE BLD-MCNC: 70 MG/DL (ref 65–99)
GLUCOSE SERPL-MCNC: 223 MG/DL (ref 65–99)
HCT VFR BLD AUTO: 36 % (ref 37–47)
HDLC SERPL-MCNC: 43 MG/DL
HGB BLD-MCNC: 11.5 G/DL (ref 12–16)
INR PPP: 0.91 (ref 0.87–1.13)
LDLC SERPL CALC-MCNC: 37 MG/DL
MCH RBC QN AUTO: 30.3 PG (ref 27–33)
MCHC RBC AUTO-ENTMCNC: 31.9 G/DL (ref 33.6–35)
MCV RBC AUTO: 95 FL (ref 81.4–97.8)
PLATELET # BLD AUTO: 270 K/UL (ref 164–446)
PMV BLD AUTO: 9.7 FL (ref 9–12.9)
POTASSIUM SERPL-SCNC: 4.2 MMOL/L (ref 3.6–5.5)
PROT SERPL-MCNC: 5.4 G/DL (ref 6–8.2)
PROTHROMBIN TIME: 12.5 SEC (ref 12–14.6)
RBC # BLD AUTO: 3.79 M/UL (ref 4.2–5.4)
SODIUM SERPL-SCNC: 139 MMOL/L (ref 135–145)
TRIGL SERPL-MCNC: 80 MG/DL (ref 0–149)
VIT B12 SERPL-MCNC: 301 PG/ML (ref 211–911)
WBC # BLD AUTO: 6 K/UL (ref 4.8–10.8)

## 2020-08-10 PROCEDURE — 85610 PROTHROMBIN TIME: CPT

## 2020-08-10 PROCEDURE — G0378 HOSPITAL OBSERVATION PER HR: HCPCS

## 2020-08-10 PROCEDURE — A9270 NON-COVERED ITEM OR SERVICE: HCPCS | Performed by: PHYSICIAN ASSISTANT

## 2020-08-10 PROCEDURE — 80061 LIPID PANEL: CPT

## 2020-08-10 PROCEDURE — 85027 COMPLETE CBC AUTOMATED: CPT

## 2020-08-10 PROCEDURE — 700117 HCHG RX CONTRAST REV CODE 255: Performed by: INTERNAL MEDICINE

## 2020-08-10 PROCEDURE — 85347 COAGULATION TIME ACTIVATED: CPT | Mod: 91

## 2020-08-10 PROCEDURE — B2111ZZ FLUOROSCOPY OF MULTIPLE CORONARY ARTERIES USING LOW OSMOLAR CONTRAST: ICD-10-PCS | Performed by: INTERNAL MEDICINE

## 2020-08-10 PROCEDURE — 700102 HCHG RX REV CODE 250 W/ 637 OVERRIDE(OP): Performed by: FAMILY MEDICINE

## 2020-08-10 PROCEDURE — 027035Z DILATION OF CORONARY ARTERY, ONE ARTERY WITH TWO DRUG-ELUTING INTRALUMINAL DEVICES, PERCUTANEOUS APPROACH: ICD-10-PCS | Performed by: INTERNAL MEDICINE

## 2020-08-10 PROCEDURE — 700111 HCHG RX REV CODE 636 W/ 250 OVERRIDE (IP)

## 2020-08-10 PROCEDURE — 4A023N7 MEASUREMENT OF CARDIAC SAMPLING AND PRESSURE, LEFT HEART, PERCUTANEOUS APPROACH: ICD-10-PCS | Performed by: INTERNAL MEDICINE

## 2020-08-10 PROCEDURE — 99152 MOD SED SAME PHYS/QHP 5/>YRS: CPT | Performed by: INTERNAL MEDICINE

## 2020-08-10 PROCEDURE — 700102 HCHG RX REV CODE 250 W/ 637 OVERRIDE(OP): Performed by: HOSPITALIST

## 2020-08-10 PROCEDURE — 36415 COLL VENOUS BLD VENIPUNCTURE: CPT

## 2020-08-10 PROCEDURE — 99153 MOD SED SAME PHYS/QHP EA: CPT

## 2020-08-10 PROCEDURE — 80053 COMPREHEN METABOLIC PANEL: CPT

## 2020-08-10 PROCEDURE — A9270 NON-COVERED ITEM OR SERVICE: HCPCS | Performed by: INTERNAL MEDICINE

## 2020-08-10 PROCEDURE — 82962 GLUCOSE BLOOD TEST: CPT

## 2020-08-10 PROCEDURE — 700102 HCHG RX REV CODE 250 W/ 637 OVERRIDE(OP)

## 2020-08-10 PROCEDURE — 700102 HCHG RX REV CODE 250 W/ 637 OVERRIDE(OP): Performed by: PHYSICIAN ASSISTANT

## 2020-08-10 PROCEDURE — 700101 HCHG RX REV CODE 250

## 2020-08-10 PROCEDURE — 99232 SBSQ HOSP IP/OBS MODERATE 35: CPT | Performed by: FAMILY MEDICINE

## 2020-08-10 PROCEDURE — 4A033BC MEASUREMENT OF ARTERIAL PRESSURE, CORONARY, PERCUTANEOUS APPROACH: ICD-10-PCS | Performed by: INTERNAL MEDICINE

## 2020-08-10 PROCEDURE — 82607 VITAMIN B-12: CPT

## 2020-08-10 PROCEDURE — 93458 L HRT ARTERY/VENTRICLE ANGIO: CPT | Mod: 26,59 | Performed by: INTERNAL MEDICINE

## 2020-08-10 PROCEDURE — A9270 NON-COVERED ITEM OR SERVICE: HCPCS | Performed by: FAMILY MEDICINE

## 2020-08-10 PROCEDURE — A9270 NON-COVERED ITEM OR SERVICE: HCPCS | Performed by: HOSPITALIST

## 2020-08-10 PROCEDURE — B2151ZZ FLUOROSCOPY OF LEFT HEART USING LOW OSMOLAR CONTRAST: ICD-10-PCS | Performed by: INTERNAL MEDICINE

## 2020-08-10 PROCEDURE — 92928 PRQ TCAT PLMT NTRAC ST 1 LES: CPT | Mod: LD | Performed by: INTERNAL MEDICINE

## 2020-08-10 PROCEDURE — 93571 IV DOP VEL&/PRESS C FLO 1ST: CPT | Mod: 26,52,LD | Performed by: INTERNAL MEDICINE

## 2020-08-10 PROCEDURE — 700102 HCHG RX REV CODE 250 W/ 637 OVERRIDE(OP): Performed by: INTERNAL MEDICINE

## 2020-08-10 PROCEDURE — A9270 NON-COVERED ITEM OR SERVICE: HCPCS

## 2020-08-10 RX ORDER — HEPARIN SODIUM,PORCINE 1000/ML
VIAL (ML) INJECTION
Status: COMPLETED
Start: 2020-08-10 | End: 2020-08-10

## 2020-08-10 RX ORDER — ASPIRIN 325 MG
TABLET ORAL
Status: COMPLETED
Start: 2020-08-10 | End: 2020-08-10

## 2020-08-10 RX ORDER — LIDOCAINE HYDROCHLORIDE 20 MG/ML
INJECTION, SOLUTION INFILTRATION; PERINEURAL
Status: COMPLETED
Start: 2020-08-10 | End: 2020-08-10

## 2020-08-10 RX ORDER — CHOLECALCIFEROL (VITAMIN D3) 125 MCG
1000 CAPSULE ORAL DAILY
Status: DISCONTINUED | OUTPATIENT
Start: 2020-08-10 | End: 2020-08-11 | Stop reason: HOSPADM

## 2020-08-10 RX ORDER — INSULIN GLARGINE 100 [IU]/ML
15 INJECTION, SOLUTION SUBCUTANEOUS EVERY EVENING
Status: DISCONTINUED | OUTPATIENT
Start: 2020-08-10 | End: 2020-08-11 | Stop reason: HOSPADM

## 2020-08-10 RX ORDER — INSULIN GLARGINE 100 [IU]/ML
22 INJECTION, SOLUTION SUBCUTANEOUS EVERY EVENING
Status: DISCONTINUED | OUTPATIENT
Start: 2020-08-10 | End: 2020-08-10

## 2020-08-10 RX ORDER — MIDAZOLAM HYDROCHLORIDE 1 MG/ML
INJECTION INTRAMUSCULAR; INTRAVENOUS
Status: COMPLETED
Start: 2020-08-10 | End: 2020-08-10

## 2020-08-10 RX ORDER — ATORVASTATIN CALCIUM 40 MG/1
40 TABLET, FILM COATED ORAL NIGHTLY
Status: DISCONTINUED | OUTPATIENT
Start: 2020-08-10 | End: 2020-08-11 | Stop reason: HOSPADM

## 2020-08-10 RX ORDER — ASPIRIN 81 MG/1
TABLET, CHEWABLE ORAL
Status: COMPLETED
Start: 2020-08-10 | End: 2020-08-10

## 2020-08-10 RX ORDER — LEVOTHYROXINE SODIUM 0.07 MG/1
150 TABLET ORAL
Status: DISCONTINUED | OUTPATIENT
Start: 2020-08-11 | End: 2020-08-11 | Stop reason: HOSPADM

## 2020-08-10 RX ORDER — DEXTROSE MONOHYDRATE 25 G/50ML
INJECTION, SOLUTION INTRAVENOUS
Status: COMPLETED
Start: 2020-08-10 | End: 2020-08-10

## 2020-08-10 RX ORDER — VERAPAMIL HYDROCHLORIDE 2.5 MG/ML
INJECTION, SOLUTION INTRAVENOUS
Status: COMPLETED
Start: 2020-08-10 | End: 2020-08-10

## 2020-08-10 RX ORDER — HEPARIN SODIUM 200 [USP'U]/100ML
INJECTION, SOLUTION INTRAVENOUS
Status: COMPLETED
Start: 2020-08-10 | End: 2020-08-10

## 2020-08-10 RX ORDER — HYDROXYZINE HYDROCHLORIDE 25 MG/1
25 TABLET, FILM COATED ORAL 3 TIMES DAILY PRN
Status: DISCONTINUED | OUTPATIENT
Start: 2020-08-10 | End: 2020-08-11 | Stop reason: HOSPADM

## 2020-08-10 RX ORDER — NICOTINE 21 MG/24HR
14 PATCH, TRANSDERMAL 24 HOURS TRANSDERMAL
Status: DISCONTINUED | OUTPATIENT
Start: 2020-08-10 | End: 2020-08-11 | Stop reason: HOSPADM

## 2020-08-10 RX ORDER — OMEPRAZOLE 20 MG/1
20 CAPSULE, DELAYED RELEASE ORAL DAILY
Status: DISCONTINUED | OUTPATIENT
Start: 2020-08-10 | End: 2020-08-11 | Stop reason: HOSPADM

## 2020-08-10 RX ADMIN — ACETAMINOPHEN 650 MG: 325 TABLET, FILM COATED ORAL at 20:16

## 2020-08-10 RX ADMIN — IOHEXOL 74 ML: 350 INJECTION, SOLUTION INTRAVENOUS at 16:12

## 2020-08-10 RX ADMIN — ASPIRIN 324 MG: 81 TABLET, CHEWABLE ORAL at 15:22

## 2020-08-10 RX ADMIN — DEXTROSE MONOHYDRATE 50 ML: 25 INJECTION, SOLUTION INTRAVENOUS at 15:37

## 2020-08-10 RX ADMIN — LIDOCAINE HYDROCHLORIDE: 20 INJECTION, SOLUTION INFILTRATION; PERINEURAL at 15:32

## 2020-08-10 RX ADMIN — METOPROLOL TARTRATE 100 MG: 50 TABLET, FILM COATED ORAL at 18:06

## 2020-08-10 RX ADMIN — INSULIN HUMAN 6 UNITS: 100 INJECTION, SOLUTION PARENTERAL at 21:16

## 2020-08-10 RX ADMIN — AMLODIPINE BESYLATE 10 MG: 10 TABLET ORAL at 18:05

## 2020-08-10 RX ADMIN — Medication 16 G: at 11:17

## 2020-08-10 RX ADMIN — HEPARIN SODIUM: 1000 INJECTION, SOLUTION INTRAVENOUS; SUBCUTANEOUS at 15:33

## 2020-08-10 RX ADMIN — MIDAZOLAM HYDROCHLORIDE 2 MG: 1 INJECTION, SOLUTION INTRAMUSCULAR; INTRAVENOUS at 15:21

## 2020-08-10 RX ADMIN — TRAZODONE HYDROCHLORIDE 100 MG: 50 TABLET ORAL at 22:15

## 2020-08-10 RX ADMIN — VERAPAMIL HYDROCHLORIDE 2.5 MG: 2.5 INJECTION, SOLUTION INTRAVENOUS at 15:33

## 2020-08-10 RX ADMIN — ATORVASTATIN CALCIUM 40 MG: 40 TABLET, FILM COATED ORAL at 21:18

## 2020-08-10 RX ADMIN — FENTANYL CITRATE 100 MCG: 50 INJECTION INTRAMUSCULAR; INTRAVENOUS at 15:21

## 2020-08-10 RX ADMIN — SUCRALFATE 1 G: 1 TABLET ORAL at 21:18

## 2020-08-10 RX ADMIN — NICOTINE 14 MG: 14 PATCH TRANSDERMAL at 11:10

## 2020-08-10 RX ADMIN — INSULIN GLARGINE 15 UNITS: 100 INJECTION, SOLUTION SUBCUTANEOUS at 18:08

## 2020-08-10 RX ADMIN — TICAGRELOR 180 MG: 90 TABLET ORAL at 16:22

## 2020-08-10 RX ADMIN — NITROGLYCERIN 10 ML: 20 INJECTION INTRAVENOUS at 15:33

## 2020-08-10 RX ADMIN — HEPARIN SODIUM 2000 UNITS: 200 INJECTION, SOLUTION INTRAVENOUS at 15:15

## 2020-08-10 RX ADMIN — MIDAZOLAM HYDROCHLORIDE 1.5 MG: 1 INJECTION, SOLUTION INTRAMUSCULAR; INTRAVENOUS at 15:56

## 2020-08-10 RX ADMIN — OMEPRAZOLE 20 MG: 20 CAPSULE, DELAYED RELEASE ORAL at 08:26

## 2020-08-10 RX ADMIN — SUCRALFATE 1 G: 1 TABLET ORAL at 18:05

## 2020-08-10 ASSESSMENT — ENCOUNTER SYMPTOMS
PALPITATIONS: 1
SHORTNESS OF BREATH: 1
CHILLS: 0
ABDOMINAL PAIN: 0
BACK PAIN: 1
COUGH: 0
HEMOPTYSIS: 0
FEVER: 0

## 2020-08-10 ASSESSMENT — FIBROSIS 4 INDEX: FIB4 SCORE: 1.39

## 2020-08-10 NOTE — ED NOTES
Patient to CT with this RN accompanying and back to ED room. Remains on monitoring, HOB elevated, positioned for comfort, respirs easy, unlabored, resting in no distress. Patient updated on pending imaging. Will continue to monitor. Remains on monitoring, siderailsx2, call light within reach.

## 2020-08-10 NOTE — PROGRESS NOTES
Patient up to floor with ACLS RN and primary RN, bay&kevin4, on room air, VSS. Patient oriented to room and call light. Patient placed on tele monitor techs notified, rhythm visualized. Bed locked in lowest position, non skid socks on, bed rails up x2, hourly rounding in place.

## 2020-08-10 NOTE — ASSESSMENT & PLAN NOTE
- Pt has a brother with stage IV cancer in [place] that she is unable to visit, contributing to her stressors  - PRN Hydroxyzine, pt states she is allergic to Ativan

## 2020-08-10 NOTE — ASSESSMENT & PLAN NOTE
- Takes Tresiba 25u qhs and SSI at home, dose reduce to 15 units due to hypoglycemic episodes  - Referral for Endocrinology placed at d/c.

## 2020-08-10 NOTE — PROGRESS NOTES
· 2 RN skin check complete.   · Devices in place tele monitor..  · Skin assessed under devices under tele monitor: skin clean, dry and intact.  · Confirmed pressure ulcers found on n/a.  · New potential pressure ulcers noted on n/a. Wound consult placed? n/a. Photo uploaded? n/a.   · The following interventions are in place moisturizer at bedside, non skid socks,   · Patient has old healed scar located on mid back and above right lower hip from previous surgery.  · All other skin clean dry and intact.

## 2020-08-10 NOTE — PROGRESS NOTES
Patient has a diagnosis of chest pain with 8 beat run of VT. Notes say angiogram today. No cath report yet.    Echocardiogram shows EF of 55 %. No HF diagnosis has been given.      Below are the defect free care measures that must be met should patient be diagnosed an ACS diagnosis after angiogram.     Meds to Beds BEDSIDE NURSING RESPONSIBILITIES:    If not already done, please assess patient for Meds to Beds Opt-In which can be found in the admit navigator. Evidence shows that meds to beds improves medication compliance and patient outcomes.      ACS Measures:    1. ASA prescribed at discharge  2. P2Y12 Inhibitor prescribed at discharge; Please note: for ACS patients who are treated medically without PCI and stenting, DAPT has been demonstrated to reduce recurrent CV events. Source: 2013 ACCF/AHA Guideline for the management of STEMI  3. Beta blockade prescribed at discharge, if patient also has HFrEF (EF less than or equal to 40%), this needs to be one of the three evidence based beta blockers: carvedilol, bisoprolol, Toprol XL  4. High intensity statin prescribed at discharge (atorvastatin 40 mg or rosuvastatin 20 mg)  5. ACE-I or ARB prescribed on discharge for LVEF less than 40%  6. Aldosterone blockade prescribed for patients with EF less than 40% AND history of diabetes mellitus OR history of heart failure, heart failure on presentation or in-hospital event  7. Intensive Cardiac Rehab referral order is placed  8. Use the Acute Coronary Syndrome discharge instructions to document that patient has been provided with the contact information for Intensive Cardiac Rehab  9. Evaluation of LV systolic function can be by angiogram, or echo before discharge, can not be a future plan for LVSF assessment  10. Daily documentation in education tab of ACS education  11. Smoking cessation documented if indicated       What if any of the above ACS Measures are contraindicated?    · Request that the discharging provider  document the medication/intervention and the contraindication specifically in a progress note  · For example: “no ACE-I meds due to hypotension” is not enough. It needs to say: “No ACE-I, ARNI, ARB due to hypotension”; “No Beta Blockade due to bradycardia”…

## 2020-08-10 NOTE — DISCHARGE PLANNING
Care Transition Team Assessment    RN BOBBY spoke with patient at bedside to complete transition assessment.  Verified information with patient on face sheet.  Patient lives in a single story home w/ her .  She states they have 1 step to get into the home.  She is independent w/ all ADLs/IADLs, except driving, which is by personal choice.  She uses the [facility] on [facility] for her primary pharmacy.  She doesn't anticipate any needs on discharge and states her  will be able to pick her up on discharge.    DC Plan:  Home w/ no needs    Information Source  Orientation : Oriented x 4  Information Given By: Patient  Who is responsible for making decisions for patient? : Patient    Readmission Evaluation  Is this a readmission?: Yes - unplanned readmission  Why do you think you were readmitted?: chest pain    Elopement Risk  Legal Hold: No  Ambulatory or Self Mobile in Wheelchair: Yes  Disoriented: No  Psychiatric Symptoms: None  History of Wandering: No  Elopement this Admit: No  Vocalizing Wanting to Leave: No  Displays Behaviors, Body Language Wanting to Leave: No-Not at Risk for Elopement  Elopement Risk: Not at Risk for Elopement    Interdisciplinary Discharge Planning  Primary Care Physician: Dr. Jarrell Yates  Lives with - Patient's Self Care Capacity: Spouse  Patient or legal guardian wants to designate a caregiver (see row info): No  Support Systems: Spouse / Significant Other, Children  Housing / Facility: 1 Story House  Do You Take your Prescribed Medications Regularly: Yes  Able to Return to Previous ADL's: Yes  Mobility Issues: No  Prior Services: Home-Independent  Patient Expects to be Discharged to:: Home  Assistance Needed: No  Durable Medical Equipment: Not Applicable    Discharge Preparedness  What is your plan after discharge?: Home with help  What are your discharge supports?: Spouse  Prior Functional Level: Independent with Medication Management, Independent with Activities of Daily Living,  Ambulatory  Difficulity with ADLs: None  Difficulity with IADLs: Driving  Difficulity with IADL Comments: states she does not like to drive, so she relies on her  and daughter for transportation.    Functional Assesment  Prior Functional Level: Independent with Medication Management, Independent with Activities of Daily Living, Ambulatory    Finances  Financial Barriers to Discharge: No  Prescription Coverage: Yes    Vision / Hearing Impairment  Vision Impairment : Yes  Right Eye Vision: Impaired, Wears Glasses  Left Eye Vision: Impaired, Wears Glasses  Hearing Impairment : No         Advance Directive  Advance Directive?: None  Advance Directive offered?: AD Booklet refused    Domestic Abuse  Have you ever been the victim of abuse or violence?: No  Physical Abuse or Sexual Abuse: No  Verbal Abuse or Emotional Abuse: No  Possible Abuse Reported to:: Not Applicable    Psychological Assessment  History of Substance Abuse: None  History of Psychiatric Problems: Yes(anxiety)  Non-compliant with Treatment: No    Discharge Risks or Barriers  Discharge risks or barriers?: No    Anticipated Discharge Information  Discharge Disposition: Discharged to home/self care (01)  Discharge Address: [address] [place] [address] [id number]   [place] [id number]  Discharge Contact Phone Number: [phone]

## 2020-08-10 NOTE — ASSESSMENT & PLAN NOTE
- Pt states she was actually on 175mcg at home, and was not taking overly reliably due to stressors in her life  - With a TSH of 0.22 and a borderline free T4 in a patient with 8 beat run of VT, will decrease her home dose of Synthroid. Discussed the need for her to follow up with PCP for thyroid testing in one month.

## 2020-08-10 NOTE — ED NOTES
Pt agreeable to oral lopressor for imaging at this time. Remains on monitoring, CT notified. Will continue to monitor.

## 2020-08-10 NOTE — CARE PLAN
Problem: Communication  Goal: The ability to communicate needs accurately and effectively will improve  Outcome: PROGRESSING AS EXPECTED  Pt updated on POC, tests, and medications. Pt verbalizes understanding and has no further questions at this time. Pt educated on calling for any more questions.     Problem: Safety  Goal: Will remain free from falls  Outcome: PROGRESSING AS EXPECTED

## 2020-08-10 NOTE — PROCEDURES
"CARDIAC CATHETERIZATION REPORT    Referring Provider: Manuel Green M.D.    PROCEDURE PHYSICIAN: Jamie Peguero MD, [facility], [facility]  ASSISTANT: None      IMPRESSIONS:  1. Unstable angina due to obstructive single vessel CAD  2. Successful PCI of the proximal through mid LAD using iFR guidance  3. Normal LVEDP    Recommendations:  DAPT  Usual post PCI care    Pre-procedure diagnosis: Unstable angina  Post-procedure diagnosis: Same    Procedure performed  Selective coronary angiography  Left heart catheterization  Percutaneous coronary intervention - Stent Placement (MINDY to proximal and mid LAD)  Instantaneous wave free ratio (iFR)    Conscious sedation was supervised by myself and administered by trained personnel using fentanyl and versed between 1512 and 1609. The patient tolerated sedation without complication.     Procedure Description  1. Access: 6 [place]olese right radial artery Micropuncture technique was utilized following local anesthesia with lidocaine.  A radial cocktail containing verapamil, heparin and saline was administered in the radial artery sheath    2. Diagnostic description: The catheter was passed to the central circulation with the aide of J tipped 0.35\" wire. 5F TIG 4.0 and 6F EBU 3.5 were used to inject the coronary circulationand enter the left ventricle during invasive hemodynamic monitoring.Once all diagnostic information was obtained the equipment was removed from the body.      3. Description of Intervention: After confirming therapeutic anticoagulation intervention proceeded. A 6F EBU 3.5 guiding catheter was used. The lesion was crossed with a 0.014\" Verrata pressure. The iFR was 0.73 indicating flow limiting stenosis. Pull back was undertaken demonstrating equal contribution of the proximal and mid LAD lesions and absence of drift in the system. I then rewired with a BMW wire. I predilated the mid lesion with a 2.5 x 15 NC and subsequently deployed a 3.0x18 Donnie MINDY at 12 jerri. I then " post dilated this with a 3.x12 NC balloon at 16-18 jerri. The proximal lesion was also pre-treated with this balloon; inflations carried out at 12 jerri. I then deployed an overlapping 3.0x26 Donnie MINDY proximally at 18 jerri. This was post dilated with a 3.5x12 NC at 16 jerri. Post procedure angiograms were obtained after administration of 210 mcg IC ntg which showed excellent results. The equipment was removed and procedure ended.     4. Hemostasis: Radial band      Findings   Hemodynamics: Aorta: 85/53 mmHg  LV: 85/15 mmHg    Coronary Anatomy   Left Main: Normal   LAD: proximal 70% and mid 80% stenosis   LCx: Minimal luminal irregularities   RCA: Dominant, high anterior takeoff with diffuse luminal irregularities, no more than 40% stenosis in the mid segment    Results of intervention to the LAD:  Pre: 80% stenosis and ZACHERY III flow  Post: 0% residual stenosis and ZACHERY III flow. No dissection or distal embolization.    Technical Factors  1. Complications: None  2. Estimated Blood Loss: <50 cc  3. Specimens: None  4. Contrast Volume: 75 ml  5. Medications: Radial cocktail (Verapamil 2.5 mg, Nitroglycerin 100 mcg) Heparin to maintain ACT >250 Ticagrelor 180 mg Intracoronary nitroglycerin  6. Radiation (Air Kerma): 392 mGy

## 2020-08-10 NOTE — ASSESSMENT & PLAN NOTE
- CTA heart as per cardiology showed possible LAD and circumflex disease, pt going for LHC this am  - Continue ASA and statin for now  - If LHC is negative, check d-dimer.  If that is negative, can be discharged home on lower dose Synthroid (home dose was actually 175mcg per patient), and Metoprolol - may need to decrease dose or stop Norvasc at discharge given her SBP in the 90s this am.

## 2020-08-10 NOTE — H&P
"Hospital Medicine History & Physical Note    Date of Service  8/9/2020    Primary Care Physician  Jarrell Yates M.D.    Consultants  Cardiology    Code Status  Full Code    Chief Complaint  Chief Complaint   Patient presents with   • Chest Pain     \"fluttering\" and CP       History of Presenting Illness  65 y.o. female who presented 8/9/2020 with past medical history of depression, diabetes type 1, hypertension, is coming today complaining of chest pain for several days probably a month that got worse last night at rest, patient describes the pain as pressure, moderate, not radiated, no increasing or decreasing factors, no history of trauma, patient also has lightheadedness and mild shortness of breath along with the pain, patient is said that she is going through a lot of her stress due to 1 of his brother being in hospice and they had a plan to travel to [place] tomorrow to see him, patient in emergency room had troponin of 30, EKG did not show acute ischemic changes, patient apparently had recent stress test, cardiology was consulted and CTA heart was done that showed possible LAD/circumflex disease, cardiology recommended admission for cardiac cath in a.m., I have discussed plan of care with patient, questions have been answered.  Patient denies any fever chills nausea vomiting diarrhea constipation no ill contact.    Review of Systems  Review of Systems   Constitutional: Negative for chills and fever.   HENT: Negative for congestion, nosebleeds and sinus pain.    Eyes: Negative for blurred vision and double vision.   Respiratory: Negative for cough, hemoptysis and wheezing.    Cardiovascular: Positive for chest pain. Negative for palpitations, claudication, leg swelling and PND.   Gastrointestinal: Negative for abdominal pain, heartburn, nausea and vomiting.   Genitourinary: Negative for hematuria and urgency.   Musculoskeletal: Negative for back pain and myalgias.   Skin: Negative for rash.   Neurological: " Negative for dizziness, tingling and headaches.   Endo/Heme/Allergies: Negative for environmental allergies. Does not bruise/bleed easily.   Psychiatric/Behavioral: Negative for depression, substance abuse and suicidal ideas.       Past Medical History   has a past medical history of Adverse effect of anesthesia, Anesthesia, Arthritis, Cigarette smoker (quit 2013), Dental disorder, depression (8/30/2016), Diabetes mellitus type 1 (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    Surgical History   has a past surgical history that includes hysterectomy, vaginal (2006); thyroid lobectomy (1973); lumpectomy (1976, 2005); cervical disk and fusion anterior (03/12/08); tonsillectomy (1963); cervical fusion posterior (1/16/2009); hardware removal neuro (1/16/2009); neck exploration (1/16/2009); abdominal hysterectomy total; lumpectomy; lumbar laminectomy diskectomy (Right, 5/10/2016); shoulder decompression arthroscopic (6/17/08); clavicle distal excision (6/17/08); shoulder arthroscopy w/ rotator cuff repair (10/9/08); pr inj,foramen,l/s,1 level (Right, 8/31/2016); spinal cord stimulator (N/A, 10/26/2018); thoracic laminectomy (N/A, 10/26/2018); appendectomy (2004); pr implant neurostim/ (N/A, 12/16/2019); irrigation & debridement neuro (1/19/2020); and cath placement (1/25/2020).     Family History  family history includes Cancer in her father; Hypertension in her mother.     Social History   reports that she has been smoking cigarettes. She has a 30.00 pack-year smoking history. She has never used smokeless tobacco. She reports that she does not drink alcohol  or use drugs.    Allergies  Allergies   Allergen Reactions   • Ativan Unspecified      Extreme Restlessness with whole body  [id number]   • Tape      Blisters, paper tape is ok       Medications  Prior to Admission Medications   Prescriptions Last Dose Informant Patient Reported? Taking?   Insulin Degludec (TRESIBA) 100 UNIT/ML Solution 8/8/2020 at PM Patient Yes No   Sig: Inject 26 Units as instructed every evening.   amLODIPine (NORVASC) 10 MG Tab 8/8/2020 at PM Patient Yes No   Sig: Take 10 mg by mouth every evening.   atorvastatin (LIPITOR) 20 MG Tab 8/8/2020 at PM Patient Yes No   Sig: Take 20 mg by mouth every evening.   insulin aspart (NOVOLOG) 100 UNIT/ML Solution 8/8/2020 at PM Patient Yes No   Sig: Inject 2-10 Units as instructed 3 times a day before meals. 1 units for every 5 g of carbs   AND  Sliding Scale   150 - 200 = 1 units  201 - 250 = 2 units  251 - 300 = 3 units  301 - 350 = 4 units  351 - 400 = 5 units   levothyroxine (SYNTHROID) 200 MCG Tab 8/9/2020 at AM Patient Yes No   Sig: Take 200 mcg by mouth Every morning on an empty stomach. BRAND NAME ONLY   mesalamine (LIALDA) 1.2 GM Tablet Delayed Response 8/8/2020 at PM Patient Yes No   Sig: Take 2.4 g by mouth every evening. 2 tablets = 2.4 g   pyridoxine (VITAMIN B-6) 50 MG Tab 8/9/2020 at AM Patient Yes No   Sig: Take 50 mg by mouth every morning.   sucralfate (CARAFATE) 1 GM Tab 8/9/2020 at AM Patient Yes No   Sig: Take 1 g by mouth 4 Times a Day,Before Meals and at Bedtime.   traZODone (DESYREL) 100 MG Tab 8/8/2020 at PM Patient Yes No   Sig: Take 100 mg by mouth at bedtime as needed for Sleep.      Facility-Administered Medications: None       Physical Exam  Temp:  [36.2 °C (97.2 °F)] 36.2 °C (97.2 °F)  Pulse:  [] 66  Resp:  [11-32] 11  BP: (107-158)/(56-87) 119/65  SpO2:  [90 %-99 %] 90 %    Physical Exam  Vitals signs and nursing note reviewed.   Constitutional:       Appearance: Normal appearance.   HENT:      Head: Normocephalic.       Nose: Nose normal.      Mouth/Throat:      Mouth: Mucous membranes are moist.      Pharynx: Oropharynx is clear.   Eyes:      General:         Right eye: No discharge.         Left eye: No discharge.      Extraocular Movements: Extraocular movements intact.      Conjunctiva/sclera: Conjunctivae normal.      Pupils: Pupils are equal, round, and reactive to light.   Neck:      Musculoskeletal: Normal range of motion and neck supple. No neck rigidity.   Cardiovascular:      Rate and Rhythm: Normal rate.      Pulses: Normal pulses.      Heart sounds: Normal heart sounds.   Pulmonary:      Effort: Pulmonary effort is normal. No respiratory distress.      Breath sounds: Normal breath sounds. No wheezing.   Abdominal:      General: Bowel sounds are normal. There is no distension.      Palpations: Abdomen is soft.      Tenderness: There is no abdominal tenderness. There is no guarding.   Musculoskeletal: Normal range of motion.         General: No swelling.   Skin:     General: Skin is warm.      Capillary Refill: Capillary refill takes less than 2 seconds.   Neurological:      General: No focal deficit present.      Mental Status: She is alert and oriented to person, place, and time.      Cranial Nerves: No cranial nerve deficit.   Psychiatric:         Mood and Affect: Mood normal.         Laboratory:  Recent Labs     08/09/20  1018   WBC 7.3   RBC 4.24   HEMOGLOBIN 13.0   HEMATOCRIT 39.5   MCV 93.2   MCH 30.7   MCHC 32.9*   RDW 44.2   PLATELETCT 304   MPV 9.4     Recent Labs     08/09/20  1018   SODIUM 139   POTASSIUM 4.5   CHLORIDE 104   CO2 21   GLUCOSE 194*   BUN 26*   CREATININE 1.00   CALCIUM 9.3     Recent Labs     08/09/20  1018   ALTSGPT 38   ASTSGOT 23   ALKPHOSPHAT 115*   TBILIRUBIN 0.3   GLUCOSE 194*         No results for input(s): NTPROBNP in the last 72 hours.      Recent Labs     08/09/20  1018 08/09/20  1208   TROPONINT 30* 32*       Imaging:  DX-CHEST-PORTABLE (1 VIEW)   Final Result      No evidence of  acute cardiopulmonary process.      CT-CTA HEART W/3D IMAGE    (Results Pending)   CL-LEFT HEART CATHETERIZATION WITH POSSIBLE INTERVENTION    (Results Pending)         Assessment/Plan:  I anticipate this patient is appropriate for observation status at this time.    * Chest pain- (present on admission)  Assessment & Plan  Mild elevated troponins, CTA heart as per cardiology showed possible LAD and circumflex disease, plan is to admit patient for cardiac cath in a.m., cardiology following    Hypertension- (present on admission)  Assessment & Plan  Continue amlodipine and metoprolol    Type 1 diabetes mellitus with kidney complication (HCC)- (present on admission)  Assessment & Plan  Continue Lantus and sliding scale insulin    Anxiety- (present on admission)  Assessment & Plan  Stable, continue trazodone at night      DVT prophylaxis SCDs

## 2020-08-10 NOTE — ASSESSMENT & PLAN NOTE
- Pt takes omeprazole daily and Lialda, discussed with patient who advised it was started by her PCP, and she was referred to GI, but she has not been seen by them yet.  She states she never filled the Rx. Will stop both medications.

## 2020-08-10 NOTE — PROGRESS NOTES
Hospital Medicine Daily Progress Note    Date of Service  8/10/2020    Chief Complaint  65 y.o. female admitted 8/9/2020 with chest pain, SOB and palpitations    Hospital Course    65 y.o. female who presented 8/9/2020 with past medical history of depression, diabetes type 1, hypothyroidism, hypertension, is coming today complaining of chest pain for several days probably a month that got worse last night at rest, patient describes the pain as pressure, moderate, not radiated, no increasing or decreasing factors, no history of trauma, patient also has lightheadedness and mild shortness of breath along with the pain, patient is said that she is going through a lot of her stress due to 1 of his brother being in hospice and they had a plan to travel to [place] tomorrow to see him, patient in emergency room had troponin of 30, EKG did not show acute ischemic changes, patient apparently had recent stress test, cardiology was consulted and CTA heart was done that showed possible LAD/circumflex disease, cardiology recommended admission for cardiac cath in a.m.    Interval Problem Update  8/9 Pt states that she still has some left sided chest pain that is worsened with ambulating, and worsened while laying on her left side. Her TSH is low; she states that she is not on 200mcg as reflected in her home meds, but 175mcg, and that she has not been taking regularly due to stress lately. As such, we will decrease her dose to 150mcg given her run of VT on presentation per Cards. She is going for [facility] today; if negative, will check a d-dimer, and if that is negative, discharge home on Metoprolol and decreased dose of Synthroid.     Consultants/Specialty  Cardiology     Code Status  Full Code    Disposition  Home after results of heart cath and d-dimer if heart cath negative     Review of Systems  Review of Systems   Constitutional: Negative for chills and fever.   Respiratory: Positive for shortness of breath. Negative for cough  and hemoptysis.    Cardiovascular: Positive for chest pain, palpitations and leg swelling.   Gastrointestinal: Negative for abdominal pain.   Musculoskeletal: Positive for back pain.   All other systems reviewed and are negative.       Physical Exam  Temp:  [36.2 °C (97.2 °F)-36.6 °C (97.8 °F)] 36.4 °C (97.6 °F)  Pulse:  [] 64  Resp:  [11-32] 18  BP: ()/(51-87) 112/66  SpO2:  [90 %-99 %] 98 %    Physical Exam  Constitutional:       Appearance: Normal appearance.   HENT:      Head: Normocephalic and atraumatic.   Eyes:      Extraocular Movements: Extraocular movements intact.   Cardiovascular:      Rate and Rhythm: Normal rate.      Heart sounds: Normal heart sounds. No murmur.   Pulmonary:      Effort: Pulmonary effort is normal. No respiratory distress.      Breath sounds: Normal breath sounds. No wheezing.   Abdominal:      General: Abdomen is flat. Bowel sounds are normal.      Palpations: Abdomen is soft.   Musculoskeletal:         General: No swelling.      Right lower leg: No edema.      Left lower leg: No edema.   Skin:     General: Skin is warm and dry.   Neurological:      General: No focal deficit present.      Mental Status: She is alert and oriented to person, place, and time. Mental status is at baseline.   Psychiatric:         Behavior: Behavior normal.         Fluids    Intake/Output Summary (Last 24 hours) at 8/10/2020 0757  Last data filed at 8/9/2020 1828  Gross per 24 hour   Intake 1000 ml   Output --   Net 1000 ml       Laboratory  Recent Labs     08/09/20  1018 08/10/20  0503   WBC 7.3 6.0   RBC 4.24 3.79*   HEMOGLOBIN 13.0 11.5*   HEMATOCRIT 39.5 36.0*   MCV 93.2 95.0   MCH 30.7 30.3   MCHC 32.9* 31.9*   RDW 44.2 45.0   PLATELETCT 304 270   MPV 9.4 9.7     Recent Labs     08/09/20  1018 08/10/20  0503   SODIUM 139 139   POTASSIUM 4.5 4.2   CHLORIDE 104 109   CO2 21 18*   GLUCOSE 194* 223*   BUN 26* 22   CREATININE 1.00 1.00   CALCIUM 9.3 8.1*             Recent Labs      08/10/20  0503   TRIGLYCERIDE 80   HDL 43   LDL 37       Imaging  DX-CHEST-PORTABLE (1 VIEW)   Final Result      No evidence of acute cardiopulmonary process.      CT-CTA HEART W/3D IMAGE    (Results Pending)   CL-LEFT HEART CATHETERIZATION WITH POSSIBLE INTERVENTION    (Results Pending)        Assessment/Plan  * Chest pain with 8 beat run of VT - (present on admission)  Assessment & Plan  - CTA heart as per cardiology showed possible LAD and circumflex disease, pt going for LHC this am  - Continue ASA and statin for now  - If LHC is negative, check d-dimer.  If that is negative, can be discharged home on lower dose Synthroid (home dose was actually 175mcg per patient), and Metoprolol - may need to decrease dose or stop Norvasc at discharge given her SBP in the 90s this am.     Hypertension- (present on admission)  Assessment & Plan  Continue amlodipine and metoprolol    Type 1 diabetes mellitus with kidney complication (HCC)- (present on admission)  Assessment & Plan  - Takes Tresiba 25u qhs and SSI at home  - Continue Lantus 25u qhs here and SSI      Hypothyroidism in adult- (present on admission)  Assessment & Plan  - Pt states she was actually on 175mcg at home, and was not taking overly reliably due to stressors in her life  - With a TSH of 0.22 and a borderline free T4 in a patient with 8 beat run of VT, will decrease her home dose of Synthroid. Discussed the need for her to follow up with PCP for thyroid testing in one month.      Anxiety- (present on admission)  Assessment & Plan  - Pt has a brother with stage IV cancer in [place] that she is unable to visit, contributing to her stressors  - PRN Hydroxyzine, pt states she is allergic to Ativan    GERD (gastroesophageal reflux disease)  Assessment & Plan  - Pt takes omeprazole daily, will resume here  - Lialda is on home med list - discussed with patient who advised it was started by her PCP, and she was referred to GI, but she has not been seen by them  yet.  She states she never filled the Rx, thus, I will stop her Lialda in hospital.        VTE prophylaxis: SCDs

## 2020-08-11 ENCOUNTER — APPOINTMENT (OUTPATIENT)
Dept: CARDIOLOGY | Facility: MEDICAL CENTER | Age: 65
DRG: 247 | End: 2020-08-11
Attending: PHYSICIAN ASSISTANT
Payer: MEDICARE

## 2020-08-11 ENCOUNTER — PHARMACY VISIT (OUTPATIENT)
Dept: PHARMACY | Facility: MEDICAL CENTER | Age: 65
End: 2020-08-11
Payer: COMMERCIAL

## 2020-08-11 VITALS
WEIGHT: 152.78 LBS | TEMPERATURE: 97.7 F | RESPIRATION RATE: 17 BRPM | HEIGHT: 66 IN | OXYGEN SATURATION: 98 % | DIASTOLIC BLOOD PRESSURE: 74 MMHG | BODY MASS INDEX: 24.55 KG/M2 | SYSTOLIC BLOOD PRESSURE: 129 MMHG | HEART RATE: 79 BPM

## 2020-08-11 PROBLEM — I25.10 CAD S/P PERCUTANEOUS CORONARY ANGIOPLASTY: Status: ACTIVE | Noted: 2020-08-11

## 2020-08-11 PROBLEM — R07.9 CHEST PAIN: Status: RESOLVED | Noted: 2020-01-25 | Resolved: 2020-08-11

## 2020-08-11 PROBLEM — Z98.61 CAD S/P PERCUTANEOUS CORONARY ANGIOPLASTY: Status: ACTIVE | Noted: 2020-08-11

## 2020-08-11 LAB
ALBUMIN SERPL BCP-MCNC: 3.8 G/DL (ref 3.2–4.9)
ALBUMIN/GLOB SERPL: 1.3 G/DL
ALP SERPL-CCNC: 122 U/L (ref 30–99)
ALT SERPL-CCNC: 44 U/L (ref 2–50)
ANION GAP SERPL CALC-SCNC: 12 MMOL/L (ref 7–16)
APTT PPP: 21 SEC (ref 24.7–36)
AST SERPL-CCNC: 28 U/L (ref 12–45)
BILIRUB SERPL-MCNC: 0.3 MG/DL (ref 0.1–1.5)
BUN SERPL-MCNC: 22 MG/DL (ref 8–22)
CALCIUM SERPL-MCNC: 8.8 MG/DL (ref 8.5–10.5)
CHLORIDE SERPL-SCNC: 105 MMOL/L (ref 96–112)
CO2 SERPL-SCNC: 21 MMOL/L (ref 20–33)
CREAT SERPL-MCNC: 0.95 MG/DL (ref 0.5–1.4)
EKG IMPRESSION: NORMAL
ERYTHROCYTE [DISTWIDTH] IN BLOOD BY AUTOMATED COUNT: 44.4 FL (ref 35.9–50)
GLOBULIN SER CALC-MCNC: 2.9 G/DL (ref 1.9–3.5)
GLUCOSE BLD-MCNC: 165 MG/DL (ref 65–99)
GLUCOSE BLD-MCNC: 17 MG/DL (ref 65–99)
GLUCOSE BLD-MCNC: 170 MG/DL (ref 65–99)
GLUCOSE BLD-MCNC: 391 MG/DL (ref 65–99)
GLUCOSE SERPL-MCNC: 374 MG/DL (ref 65–99)
HCT VFR BLD AUTO: 42.9 % (ref 37–47)
HGB BLD-MCNC: 14 G/DL (ref 12–16)
INR PPP: 0.85 (ref 0.87–1.13)
LV EJECT FRACT  99904: 60
LV EJECT FRACT MOD 2C 99903: 69.3
LV EJECT FRACT MOD 4C 99902: 54.57
LV EJECT FRACT MOD BP 99901: 62.64
MCH RBC QN AUTO: 30.1 PG (ref 27–33)
MCHC RBC AUTO-ENTMCNC: 32.2 G/DL (ref 33.6–35)
MCV RBC AUTO: 93.5 FL (ref 81.4–97.8)
PLATELET # BLD AUTO: 270 K/UL (ref 164–446)
PMV BLD AUTO: 9.6 FL (ref 9–12.9)
POTASSIUM SERPL-SCNC: 4.6 MMOL/L (ref 3.6–5.5)
PROT SERPL-MCNC: 6.7 G/DL (ref 6–8.2)
PROTHROMBIN TIME: 11.8 SEC (ref 12–14.6)
RBC # BLD AUTO: 4.62 M/UL (ref 4.2–5.4)
SODIUM SERPL-SCNC: 138 MMOL/L (ref 135–145)
WBC # BLD AUTO: 10.7 K/UL (ref 4.8–10.8)

## 2020-08-11 PROCEDURE — RXMED WILLOW AMBULATORY MEDICATION CHARGE: Performed by: STUDENT IN AN ORGANIZED HEALTH CARE EDUCATION/TRAINING PROGRAM

## 2020-08-11 PROCEDURE — A9270 NON-COVERED ITEM OR SERVICE: HCPCS | Performed by: INTERNAL MEDICINE

## 2020-08-11 PROCEDURE — 93308 TTE F-UP OR LMTD: CPT | Mod: 26 | Performed by: INTERNAL MEDICINE

## 2020-08-11 PROCEDURE — 93005 ELECTROCARDIOGRAM TRACING: CPT | Performed by: INTERNAL MEDICINE

## 2020-08-11 PROCEDURE — 82962 GLUCOSE BLOOD TEST: CPT

## 2020-08-11 PROCEDURE — 99238 HOSP IP/OBS DSCHRG MGMT 30/<: CPT | Performed by: STUDENT IN AN ORGANIZED HEALTH CARE EDUCATION/TRAINING PROGRAM

## 2020-08-11 PROCEDURE — 700102 HCHG RX REV CODE 250 W/ 637 OVERRIDE(OP): Performed by: INTERNAL MEDICINE

## 2020-08-11 PROCEDURE — 93010 ELECTROCARDIOGRAM REPORT: CPT | Performed by: INTERNAL MEDICINE

## 2020-08-11 PROCEDURE — 700101 HCHG RX REV CODE 250: Performed by: HOSPITALIST

## 2020-08-11 PROCEDURE — 85730 THROMBOPLASTIN TIME PARTIAL: CPT

## 2020-08-11 PROCEDURE — 99232 SBSQ HOSP IP/OBS MODERATE 35: CPT | Performed by: INTERNAL MEDICINE

## 2020-08-11 PROCEDURE — 770020 HCHG ROOM/CARE - TELE (206)

## 2020-08-11 PROCEDURE — 36415 COLL VENOUS BLD VENIPUNCTURE: CPT

## 2020-08-11 PROCEDURE — 93308 TTE F-UP OR LMTD: CPT

## 2020-08-11 PROCEDURE — 85027 COMPLETE CBC AUTOMATED: CPT

## 2020-08-11 PROCEDURE — 97535 SELF CARE MNGMENT TRAINING: CPT

## 2020-08-11 PROCEDURE — 85610 PROTHROMBIN TIME: CPT

## 2020-08-11 PROCEDURE — 700102 HCHG RX REV CODE 250 W/ 637 OVERRIDE(OP): Performed by: STUDENT IN AN ORGANIZED HEALTH CARE EDUCATION/TRAINING PROGRAM

## 2020-08-11 PROCEDURE — 80053 COMPREHEN METABOLIC PANEL: CPT

## 2020-08-11 PROCEDURE — A9270 NON-COVERED ITEM OR SERVICE: HCPCS | Performed by: FAMILY MEDICINE

## 2020-08-11 PROCEDURE — 700102 HCHG RX REV CODE 250 W/ 637 OVERRIDE(OP): Performed by: FAMILY MEDICINE

## 2020-08-11 PROCEDURE — RXMED WILLOW AMBULATORY MEDICATION CHARGE: Performed by: PHYSICIAN ASSISTANT

## 2020-08-11 RX ORDER — METOPROLOL TARTRATE 100 MG/1
100 TABLET ORAL 2 TIMES DAILY
Qty: 60 TAB | Refills: 3 | Status: SHIPPED | OUTPATIENT
Start: 2020-08-11 | End: 2020-09-10 | Stop reason: SDUPTHER

## 2020-08-11 RX ORDER — AMLODIPINE BESYLATE 10 MG/1
10 TABLET ORAL EVERY EVENING
Qty: 30 TAB | Refills: 11 | Status: SHIPPED | OUTPATIENT
Start: 2020-08-11 | End: 2020-09-10 | Stop reason: SDUPTHER

## 2020-08-11 RX ORDER — SODIUM CHLORIDE 9 MG/ML
INJECTION, SOLUTION INTRAVENOUS CONTINUOUS
Status: DISCONTINUED | OUTPATIENT
Start: 2020-08-11 | End: 2020-08-11

## 2020-08-11 RX ORDER — LEVOTHYROXINE SODIUM 0.15 MG/1
150 TABLET ORAL
Qty: 30 TAB | Refills: 2 | Status: SHIPPED | OUTPATIENT
Start: 2020-08-12 | End: 2020-09-10

## 2020-08-11 RX ORDER — INSULIN DEGLUDEC INJECTION 100 U/ML
15 INJECTION, SOLUTION SUBCUTANEOUS EVERY EVENING
Qty: 1 ML | Refills: 0
Start: 2020-08-11 | End: 2021-08-03

## 2020-08-11 RX ORDER — LEVOTHYROXINE SODIUM 175 UG/1
175 TABLET ORAL
Qty: 30 TAB | Refills: 2 | Status: SHIPPED | OUTPATIENT
Start: 2020-08-11 | End: 2020-08-11

## 2020-08-11 RX ORDER — NITROGLYCERIN 0.4 MG/1
0.4 TABLET SUBLINGUAL PRN
Qty: 25 TAB | Refills: 0 | Status: SHIPPED | OUTPATIENT
Start: 2020-08-11 | End: 2020-08-17

## 2020-08-11 RX ORDER — ASPIRIN 81 MG/1
81 TABLET ORAL EVERY MORNING
Qty: 30 TAB | COMMUNITY
Start: 2020-08-12 | End: 2020-09-10 | Stop reason: SDUPTHER

## 2020-08-11 RX ORDER — ATORVASTATIN CALCIUM 40 MG/1
40 TABLET, FILM COATED ORAL EVERY EVENING
Qty: 90 TAB | Refills: 3 | Status: SHIPPED | OUTPATIENT
Start: 2020-08-11 | End: 2020-09-10 | Stop reason: SDUPTHER

## 2020-08-11 RX ADMIN — OMEPRAZOLE 20 MG: 20 CAPSULE, DELAYED RELEASE ORAL at 05:40

## 2020-08-11 RX ADMIN — METOPROLOL TARTRATE 100 MG: 50 TABLET, FILM COATED ORAL at 05:41

## 2020-08-11 RX ADMIN — TICAGRELOR 90 MG: 90 TABLET ORAL at 05:42

## 2020-08-11 RX ADMIN — ASPIRIN 81 MG: 81 TABLET, COATED ORAL at 05:41

## 2020-08-11 RX ADMIN — CYANOCOBALAMIN TAB 500 MCG 1000 MCG: 500 TAB at 05:40

## 2020-08-11 RX ADMIN — DEXTROSE MONOHYDRATE 50 ML: 25 INJECTION, SOLUTION INTRAVENOUS at 03:18

## 2020-08-11 RX ADMIN — INSULIN HUMAN 2 UNITS: 100 INJECTION, SOLUTION PARENTERAL at 10:19

## 2020-08-11 RX ADMIN — LEVOTHYROXINE SODIUM 150 MCG: 0.07 TABLET ORAL at 05:40

## 2020-08-11 ASSESSMENT — ENCOUNTER SYMPTOMS
COUGH: 0
CONSTITUTIONAL NEGATIVE: 1
BACK PAIN: 1
GASTROINTESTINAL NEGATIVE: 1
NAUSEA: 0
MUSCULOSKELETAL NEGATIVE: 1
EYES NEGATIVE: 1
MYALGIAS: 0
FEVER: 0
DIZZINESS: 0
ABDOMINAL PAIN: 0
CHILLS: 0
WEAKNESS: 0
PSYCHIATRIC NEGATIVE: 1
NEUROLOGICAL NEGATIVE: 1
HEMOPTYSIS: 0
NERVOUS/ANXIOUS: 0
RESPIRATORY NEGATIVE: 1
PALPITATIONS: 0
BRUISES/BLEEDS EASILY: 0
VOMITING: 0
CARDIOVASCULAR NEGATIVE: 1
SHORTNESS OF BREATH: 0
HEADACHES: 0

## 2020-08-11 ASSESSMENT — LIFESTYLE VARIABLES
CONSUMPTION TOTAL: POSITIVE
HAVE YOU EVER FELT YOU SHOULD CUT DOWN ON YOUR DRINKING: YES
HOW MANY TIMES IN THE PAST YEAR HAVE YOU HAD 5 OR MORE DRINKS IN A DAY: 5
AVERAGE NUMBER OF DAYS PER WEEK YOU HAVE A DRINK CONTAINING ALCOHOL: 5
HAVE PEOPLE ANNOYED YOU BY CRITICIZING YOUR DRINKING: NO
TOTAL SCORE: 3
DOES PATIENT WANT TO STOP DRINKING: YES
ON A TYPICAL DAY WHEN YOU DRINK ALCOHOL HOW MANY DRINKS DO YOU HAVE: 5
EVER HAD A DRINK FIRST THING IN THE MORNING TO STEADY YOUR NERVES TO GET RID OF A HANGOVER: YES
EVER FELT BAD OR GUILTY ABOUT YOUR DRINKING: YES
DOES PATIENT WANT TO TALK TO SOMEONE ABOUT QUITTING: NO
TOTAL SCORE: 3
ALCOHOL_USE: YES
TOTAL SCORE: 3

## 2020-08-11 NOTE — ASSESSMENT & PLAN NOTE
S/p LHC    1. Unstable angina due to obstructive single vessel CAD  2. Successful PCI of the proximal through mid LAD using iFR guidance  3. Normal LVEDP    - Cardiology following  - DAPT  - Statin  - BB/CCB for BP control  - Echo pending.

## 2020-08-11 NOTE — DISCHARGE SUMMARY
"Discharge Summary    CHIEF COMPLAINT ON ADMISSION  Chief Complaint   Patient presents with   • Chest Pain     \"fluttering\" and CP       Reason for Admission  PALPATATIONS/CP     Admission Date  8/9/2020    CODE STATUS  Full Code    HPI & HOSPITAL COURSE   65 y.o. female who presented 8/9/2020 with past medical history of depression, diabetes type 1, hypothyroidism, hypertension, who p/w complaints of chest pain for several days probably a month that got worse last night at rest, patient describes the pain as pressure, moderate, not radiated, no increasing or decreasing factors, no history of trauma, patient also has lightheadedness and mild shortness of breath along with the pain, patient is said that she is going through a lot of her stress due to 1 of his brother being in hospice and they had a plan to travel to [place] tomorrow to see him, patient in emergency room had troponin of 30, EKG did not show acute ischemic changes, patient apparently had recent stress test, cardiology was consulted and CTA heart was done that showed possible LAD/circumflex disease, cardi[facility] with LAD stenting done. Started on GDMT. Echo with preserved EF. Had episodes of hypoglycemia, had her home insulin regimen adjusted and advised to follow up outpatient with Endocrinology.      Consultants/Specialty  Cardiology     Code Status  Full Code    Disposition  Home after results of heart cath and d-dimer if heart cath negative     Review of Systems  Review of Systems   Constitutional: Negative for chills and fever.   Respiratory: Negative for cough, hemoptysis and shortness of breath.    Cardiovascular: Negative for chest pain, palpitations and leg swelling.   Gastrointestinal: Negative for abdominal pain.   Musculoskeletal: Positive for back pain.   All other systems reviewed and are negative.       Physical Exam  Temp:  [36.3 °C (97.4 °F)-36.9 °C (98.4 °F)] 36.7 °C (98 " °F)  Pulse:  [] 75  Resp:  [17-18] 18  BP: (100-155)/(57-89) 121/77  SpO2:  [94 %-98 %] 94 %    Physical Exam  Constitutional:       Appearance: Normal appearance.   HENT:      Head: Normocephalic and atraumatic.   Eyes:      Extraocular Movements: Extraocular movements intact.   Cardiovascular:      Rate and Rhythm: Normal rate.      Heart sounds: Normal heart sounds. No murmur.   Pulmonary:      Effort: Pulmonary effort is normal. No respiratory distress.      Breath sounds: Normal breath sounds. No wheezing.   Abdominal:      General: Abdomen is flat. Bowel sounds are normal.      Palpations: Abdomen is soft.   Musculoskeletal:         General: No swelling.      Right lower leg: No edema.      Left lower leg: No edema.   Skin:     General: Skin is warm and dry.   Neurological:      General: No focal deficit present.      Mental Status: She is alert and oriented to person, place, and time. Mental status is at baseline.   Psychiatric:         Behavior: Behavior normal.         Fluids    Intake/Output Summary (Last 24 hours) at 8/11/2020 1032  Last data filed at 8/10/2020 1705  Gross per 24 hour   Intake 340 ml   Output --   Net 340 ml       Laboratory  Recent Labs     08/09/20  1018 08/10/20  0503 08/11/20  0358   WBC 7.3 6.0 10.7   RBC 4.24 3.79* 4.62   HEMOGLOBIN 13.0 11.5* 14.0   HEMATOCRIT 39.5 36.0* 42.9   MCV 93.2 95.0 93.5   MCH 30.7 30.3 30.1   MCHC 32.9* 31.9* 32.2*   RDW 44.2 45.0 44.4   PLATELETCT 304 270 270   MPV 9.4 9.7 9.6     Recent Labs     08/09/20  1018 08/10/20  0503 08/11/20  0845   SODIUM 139 139 138   POTASSIUM 4.5 4.2 4.6   CHLORIDE 104 109 105   CO2 21 18* 21   GLUCOSE 194* 223* 374*   BUN 26* 22 22   CREATININE 1.00 1.00 0.95   CALCIUM 9.3 8.1* 8.8     Recent Labs     08/10/20  0934 08/11/20  0358   APTT  --  21.0*   INR 0.91 0.85*         Recent Labs     08/10/20  0503   TRIGLYCERIDE 80   HDL 43   LDL 37       Imaging  CT-CTA HEART W/3D IMAGE   Final Result      1. Two-vessel  coronary artery disease involving RCA and LAD.   2. Greater than 70% stenosis in the proximal RCA secondary to mixed plaque.   3. Greater than 50% stenosis in the proximal LAD secondary to soft plaque, distal to the first diagonal takeoff.   4. Greater than 70% stenosis of the mid LAD secondary to predominantly calcific plaque.                        DX-CHEST-PORTABLE (1 VIEW)   Final Result      No evidence of acute cardiopulmonary process.      CL-LEFT HEART CATHETERIZATION WITH POSSIBLE INTERVENTION    (Results Pending)   EC-ECHOCARDIOGRAM LTD W/O CONT    (Results Pending)        Assessment/Plan  Hypertension- (present on admission)  Assessment & Plan  Continue amlodipine and metoprolol     Type 1 diabetes mellitus with kidney complication (HCC)- (present on admission)  Assessment & Plan  - Takes Tresiba 25u qhs and SSI at home, dose reduce to 15 units due to hypoglycemic episodes  - Referral for Endocrinology placed at d/c.        CAD S/P percutaneous coronary angioplasty  Assessment & Plan  S/p C    1. Unstable angina due to obstructive single vessel CAD  2. Successful PCI of the proximal through mid LAD using iFR guidance  3. Normal LVEDP    - Cardiology following  - DAPT  - Statin  - BB/CCB for BP control  - Echo pending.     Hypothyroidism in adult- (present on admission)  Assessment & Plan  - Pt states she was actually on 175mcg at home, and was not taking overly reliably due to stressors in her life  - With a TSH of 0.22 and a borderline free T4 in a patient with 8 beat run of VT, will decrease her home dose of Synthroid. Discussed the need for her to follow up with PCP for thyroid testing in one month.      Anxiety- (present on admission)  Assessment & Plan  - Pt has a brother with stage IV cancer in [place] that she is unable to visit, contributing to her stressors  - PRN Hydroxyzine, pt states she is allergic to Ativan      GERD (gastroesophageal reflux disease)  Assessment & Plan  - Pt takes  omeprazole daily and Lialda, discussed with patient who advised it was started by her PCP, and she was referred to GI, but she has not been seen by them yet.  She states she never filled the Rx. Will stop both medications.        VTE prophylaxis: SCDs      Therefore, she is discharged in fair and stable condition to home with close outpatient follow-up.    The patient met 2-midnight criteria for an inpatient stay at the time of discharge.    Discharge Date  08/11/20    FOLLOW UP ITEMS POST DISCHARGE  Outpatient follow up with PCP, Cardiology and Endocrinology.     DISCHARGE DIAGNOSES  Principal Problem (Resolved):    Chest pain with 8 beat run of VT  POA: Yes  Active Problems:    Type 1 diabetes mellitus with kidney complication (HCC) POA: Yes    Hypertension POA: Yes    Anxiety POA: Yes    Hypothyroidism in adult POA: Yes      Overview:                 CAD S/P percutaneous coronary angioplasty POA: Unknown    GERD (gastroesophageal reflux disease) POA: Unknown      FOLLOW UP  Future Appointments   Date Time Provider Department Center   8/19/2020  8:00 AM Quinn Avila M.D. [facility] None     [facility], Emergency Dept  [address] [id number] [phone]    If symptoms worsen    Manuel Green M.D.  [address]  [address]  [facility] [id number] [phone]          Jarrell Yates M.D.  [address]  [address]  [facility] [address]  [phone]    Schedule an appointment as soon as possible for a visit  Hospital  left a voicemail with your providers office. If you do not hear back from them in 1 business day, please call the office and schedule a hospital follow up. Thank you!    Cardiology      Please ensure patient has a follow up appointment with cardiology prior to discharge    Cardiac Rehab      Your doctor has referred you to Cardiac Rehab, which is important to your recovery. Please call to make an appointment, or speak to your local doctor to find a program  in your area.      MEDICATIONS ON DISCHARGE     Medication List      START taking these medications      Instructions   aspirin 81 MG EC tablet  Start taking on: August 12, 2020   Take 1 Tab by mouth every day.  Dose: 81 mg     metoprolol 100 MG Tabs  Commonly known as: LOPRESSOR   Take 1 Tab by mouth 2 Times a Day.  Dose: 100 mg     nitroglycerin 0.4 MG Subl  Commonly known as: NITROSTAT   Place 1 Tab under tongue as needed for Chest Pain.  Dose: 0.4 mg     ticagrelor 90 MG Tabs tablet  Commonly known as: BRILINTA   Doctor's comments: Meds to beds please  Take 1 Tab by mouth 2 Times a Day.  Dose: 90 mg        CHANGE how you take these medications      Instructions   atorvastatin 40 MG Tabs  What changed:   · medication strength  · how much to take  · when to take this  Commonly known as: LIPITOR   Take 1 Tab by mouth every evening.  Dose: 40 mg     levothyroxine 150 MCG Tabs  Start taking on: August 12, 2020  What changed:   · medication strength  · how much to take  · additional instructions  Commonly known as: SYNTHROID   Doctor's comments: MD indicated brand synthroid via tiger text  Take 1 Tab by mouth Every morning on an empty stomach.  Dose: 150 mcg     Tresiba 100 UNIT/ML Soln  What changed: how much to take  Generic drug: Insulin Degludec   Inject 15 Units as instructed every evening.  Dose: 15 Units        CONTINUE taking these medications      Instructions   amLODIPine 10 MG Tabs  Commonly known as: NORVASC   Take 1 Tab by mouth every evening.  Dose: 10 mg     NovoLOG 100 UNIT/ML Soln  Generic drug: insulin aspart   Inject 2-10 Units as instructed 3 times a day before meals. 1 units for every 5 g of carbs   AND  Sliding Scale   150 - 200 = 1 units  201 - 250 = 2 units  251 - 300 = 3 units  301 - 350 = 4 units  351 - 400 = 5 units  Dose: 2-10 Units     pyridoxine 50 MG Tabs  Commonly known as: VITAMIN B-6   Take 50 mg by mouth every morning.  Dose: 50 mg     sucralfate 1 GM Tabs  Commonly known as:  CARAFATE   Take 1 g by mouth 4 Times a Day,Before Meals and at Bedtime.  Dose: 1 g     traZODone 100 MG Tabs  Commonly known as: DESYREL   Take 100 mg by mouth at bedtime as needed for Sleep.  Dose: 100 mg        STOP taking these medications    mesalamine 1.2 GM Tbec  Commonly known as: LIALDA            Allergies  Allergies   Allergen Reactions   • Ativan Unspecified      Extreme Restlessness with whole body  HQH=5554   • Tape      Blisters, paper tape is ok       DIET  Orders Placed This Encounter   Procedures   • Diet Order Diabetic     Standing Status:   Standing     Number of Occurrences:   1     Order Specific Question:   Diet:     Answer:   Diabetic [3]       ACTIVITY  As tolerated.  Weight bearing as tolerated    CONSULTATIONS  Cardiology    PROCEDURES  [facility]    LABORATORY  Lab Results   Component Value Date    SODIUM 138 08/11/2020    POTASSIUM 4.6 08/11/2020    CHLORIDE 105 08/11/2020    CO2 21 08/11/2020    GLUCOSE 374 (H) 08/11/2020    BUN 22 08/11/2020    CREATININE 0.95 08/11/2020    CREATININE 1.0 01/08/2009        Lab Results   Component Value Date    WBC 10.7 08/11/2020    HEMOGLOBIN 14.0 08/11/2020    HEMATOCRIT 42.9 08/11/2020    PLATELETCT 270 08/11/2020        Total time of the discharge process exceeds 15 minutes.

## 2020-08-11 NOTE — PROGRESS NOTES
ACS Navigator Consult Note    Unstable angina due to CAB diagnosis.     Management: S/P PCI    Current assessment of LV Function shows: 55%    Reviewed ACS medications:  · DAPT: aspirin 81mg + Brilinta 90mg Please note: for ACS patients who are treated medically without PCI and stenting, DAPT has been demonstrated to reduce recurrent CV events. Source: 2013 ACCF/AHA Guideline for the management of STEMI  · Beta-Blocker:  metoprolol 100mg   · Statin:  atorvastatin 40mg   · Consider for aldosterone blockade?  none -- EF is 55%  · Consider for ACE-I/ARB/ARNI?  none -- EF is 55%    Meds to Beds BEDSIDE NURSING RESPONSIBILITIES:    If not already done, please assess patient for Meds to Beds Opt-In which can be found in the admit navigator (see below). Evidence shows that meds to beds improves medication compliance and patient outcomes.          Intensive Cardiac Rehab (ICR) Referral  Referred on 8/10/20; has current inpatient orders for nutrition consult & PT for Phase I ICR    Smoking Cessation?  Indicated? YES  Documented smoking 30 pk/year    Demographics  Patient resides in: [place], [place]  Insurance: [facility]    Inpatient & Discharge Patient Education  Bedside nursing to continually provide patient education on ACS meds, signs and symptoms to monitor for, and risk factor modification.     Also at discharge please complete the “Acute Coronary Syndrome” special instructions on the AVS:          Follow up  Cardiology: Please ensure patient has a follow up appointment with cardiology prior to discharge.    Cardiac Rehab: Your doctor has referred you to Cardiac Rehab, which is important to your recovery. Please call to make an appointment, or speak to your local doctor to find a program in your area.    Thank you, Latasha Gonzales RN, Cardiac Valve Clinical Coordinator, [phone], or tiger text.

## 2020-08-11 NOTE — PROGRESS NOTES
Discharge instructions give to patient at bedside. Pt is AOx4, and verbalizes understanding and states plans for follow up. New and/or home medications reviewed, meds to bed received, post discharge activity level and worsening of symptoms needing follow up care discussed. Telemetry monitor, ID band, and IV removed. All belongings accounted for, all questions answered at this time. Pt was taken by wheelchair to car by CNA.

## 2020-08-11 NOTE — PROGRESS NOTES
Nursing reports pt had BS of 29 in the cath lab; received home dose of Lantus last night. Reduce to 15u tonight and can supplement with am Lantus if BSs rise.

## 2020-08-11 NOTE — PROGRESS NOTES
[facility] Medicine Daily Progress Note    Date of Service  8/11/2020    Chief Complaint  65 y.o. female admitted 8/9/2020 with chest pain, SOB and palpitations    Hospital Course    65 y.o. female who presented 8/9/2020 with past medical history of depression, diabetes type 1, hypothyroidism, hypertension, is coming today complaining of chest pain for several days probably a month that got worse last night at rest, patient describes the pain as pressure, moderate, not radiated, no increasing or decreasing factors, no history of trauma, patient also has lightheadedness and mild shortness of breath along with the pain, patient is said that she is going through a lot of her stress due to 1 of his brother being in hospice and they had a plan to travel to [place] tomorrow to see him, patient in emergency room had troponin of 30, EKG did not show acute ischemic changes, patient apparently had recent stress test, cardiology was consulted and CTA heart was done that showed possible LAD/circumflex disease, cardiology recommended admission for cardiac cath in a.m.    Interval Problem Update  8/9 Pt states that she still has some left sided chest pain that is worsened with ambulating, and worsened while laying on her left side. Her TSH is low; she states that she is not on 200mcg as reflected in her home meds, but 175mcg, and that she has not been taking regularly due to stress lately. As such, we will decrease her dose to 150mcg given her run of VT on presentation per Cards. She is going for LHC today; if negative, will check a d-dimer, and if that is negative, discharge home on Metoprolol and decreased dose of Synthroid.   8/11: S/p LHC yesterday, no CP this morning. Episodes of hypoglycemia yesterday, insulin regimen adjusted. Echo pending and then d/c home.    Consultants/Specialty  Cardiology     Code Status  Full Code    Disposition  Home after results of heart cath and d-dimer if heart cath negative     Review of  Systems  Review of Systems   Constitutional: Negative for chills and fever.   Respiratory: Negative for cough, hemoptysis and shortness of breath.    Cardiovascular: Negative for chest pain, palpitations and leg swelling.   Gastrointestinal: Negative for abdominal pain.   Musculoskeletal: Positive for back pain.   All other systems reviewed and are negative.       Physical Exam  Temp:  [36.3 °C (97.4 °F)-36.9 °C (98.4 °F)] 36.7 °C (98 °F)  Pulse:  [] 75  Resp:  [17-18] 18  BP: (100-155)/(57-89) 121/77  SpO2:  [94 %-98 %] 94 %    Physical Exam  Constitutional:       Appearance: Normal appearance.   HENT:      Head: Normocephalic and atraumatic.   Eyes:      Extraocular Movements: Extraocular movements intact.   Cardiovascular:      Rate and Rhythm: Normal rate.      Heart sounds: Normal heart sounds. No murmur.   Pulmonary:      Effort: Pulmonary effort is normal. No respiratory distress.      Breath sounds: Normal breath sounds. No wheezing.   Abdominal:      General: Abdomen is flat. Bowel sounds are normal.      Palpations: Abdomen is soft.   Musculoskeletal:         General: No swelling.      Right lower leg: No edema.      Left lower leg: No edema.   Skin:     General: Skin is warm and dry.   Neurological:      General: No focal deficit present.      Mental Status: She is alert and oriented to person, place, and time. Mental status is at baseline.   Psychiatric:         Behavior: Behavior normal.         Fluids    Intake/Output Summary (Last 24 hours) at 8/11/2020 1032  Last data filed at 8/10/2020 1705  Gross per 24 hour   Intake 340 ml   Output --   Net 340 ml       Laboratory  Recent Labs     08/09/20  1018 08/10/20  0503 08/11/20  0358   WBC 7.3 6.0 10.7   RBC 4.24 3.79* 4.62   HEMOGLOBIN 13.0 11.5* 14.0   HEMATOCRIT 39.5 36.0* 42.9   MCV 93.2 95.0 93.5   MCH 30.7 30.3 30.1   MCHC 32.9* 31.9* 32.2*   RDW 44.2 45.0 44.4   PLATELETCT 304 270 270   MPV 9.4 9.7 9.6     Recent Labs     08/09/20  1018  08/10/20  0503 08/11/20  0845   SODIUM 139 139 138   POTASSIUM 4.5 4.2 4.6   CHLORIDE 104 109 105   CO2 21 18* 21   GLUCOSE 194* 223* 374*   BUN 26* 22 22   CREATININE 1.00 1.00 0.95   CALCIUM 9.3 8.1* 8.8     Recent Labs     08/10/20  0934 08/11/20  0358   APTT  --  21.0*   INR 0.91 0.85*         Recent Labs     08/10/20  0503   TRIGLYCERIDE 80   HDL 43   LDL 37       Imaging  CT-CTA HEART W/3D IMAGE   Final Result      1. Two-vessel coronary artery disease involving RCA and LAD.   2. Greater than 70% stenosis in the proximal RCA secondary to mixed plaque.   3. Greater than 50% stenosis in the proximal LAD secondary to soft plaque, distal to the first diagonal takeoff.   4. Greater than 70% stenosis of the mid LAD secondary to predominantly calcific plaque.                        DX-CHEST-PORTABLE (1 VIEW)   Final Result      No evidence of acute cardiopulmonary process.      CL-LEFT HEART CATHETERIZATION WITH POSSIBLE INTERVENTION    (Results Pending)   EC-ECHOCARDIOGRAM LTD W/O CONT    (Results Pending)        Assessment/Plan  Hypertension- (present on admission)  Assessment & Plan  Continue amlodipine and metoprolol     Type 1 diabetes mellitus with kidney complication (HCC)- (present on admission)  Assessment & Plan  - Takes Tresiba 25u qhs and SSI at home, dose reduce to 15 units due to hypoglycemic episodes  - Referral for Endocrinology placed at d/c.        CAD S/P percutaneous coronary angioplasty  Assessment & Plan  S/p LHC    1. Unstable angina due to obstructive single vessel CAD  2. Successful PCI of the proximal through mid LAD using iFR guidance  3. Normal LVEDP    - Cardiology following  - DAPT  - Statin  - BB/CCB for BP control  - Echo pending.     Hypothyroidism in adult- (present on admission)  Assessment & Plan  - Pt states she was actually on 175mcg at home, and was not taking overly reliably due to stressors in her life  - With a TSH of 0.22 and a borderline free T4 in a patient with 8 beat run  of VT, will decrease her home dose of Synthroid. Discussed the need for her to follow up with PCP for thyroid testing in one month.      Anxiety- (present on admission)  Assessment & Plan  - Pt has a brother with stage IV cancer in [place] that she is unable to visit, contributing to her stressors  - PRN Hydroxyzine, pt states she is allergic to Ativan      GERD (gastroesophageal reflux disease)  Assessment & Plan  - Pt takes omeprazole daily and Lialda, discussed with patient who advised it was started by her PCP, and she was referred to GI, but she has not been seen by them yet.  She states she never filled the Rx. Will stop both medications.        VTE prophylaxis: SCDs

## 2020-08-11 NOTE — PROGRESS NOTES
Cardiology Follow Up Progress Note    Date of Service  8/11/2020    Attending Physician  Shaq Veliz D.O.    Chief Complaint   Unstable angina, coronary artery disease status post stent placement    HPI  Anu Manuel is a 65 y.o. female admitted 8/9/2020 with above.    Interim Events  She underwent cardiac catheterization as below.    Review of Systems  Review of Systems   Constitutional: Negative.  Negative for chills and fever.   HENT: Negative.  Negative for hearing loss.    Eyes: Negative.    Respiratory: Negative.  Negative for cough and shortness of breath.    Cardiovascular: Negative.  Negative for chest pain, palpitations and leg swelling.   Gastrointestinal: Negative.  Negative for abdominal pain, nausea and vomiting.   Genitourinary: Negative.  Negative for dysuria and urgency.   Musculoskeletal: Negative.  Negative for myalgias.   Skin: Negative.  Negative for rash.   Neurological: Negative.  Negative for dizziness, weakness and headaches.   Hematological: Does not bruise/bleed easily.   Psychiatric/Behavioral: Negative.  The patient is not nervous/anxious.        Vital signs in last 24 hours  Temp:  [36.3 °C (97.4 °F)-36.9 °C (98.4 °F)] 36.5 °C (97.7 °F)  Pulse:  [] 79  Resp:  [17-18] 17  BP: (100-155)/(57-89) 129/74  SpO2:  [94 %-98 %] 98 %    Physical Exam  Physical Exam  Constitutional:       Appearance: She is well-developed.   HENT:      Head: Normocephalic and atraumatic.   Eyes:      Conjunctiva/sclera: Conjunctivae normal.      Pupils: Pupils are equal, round, and reactive to light.   Neck:      Musculoskeletal: Normal range of motion and neck supple.   Cardiovascular:      Rate and Rhythm: Normal rate and regular rhythm.   Pulmonary:      Effort: Pulmonary effort is normal.      Breath sounds: Normal breath sounds.   Abdominal:      General: Bowel sounds are normal.      Palpations: Abdomen is soft.   Musculoskeletal: Normal range of motion.   Skin:     General: Skin is  warm and dry.   Neurological:      Mental Status: She is alert and oriented to person, place, and time.         Lab Review  Lab Results   Component Value Date/Time    WBC 10.7 08/11/2020 03:58 AM    RBC 4.62 08/11/2020 03:58 AM    HEMOGLOBIN 14.0 08/11/2020 03:58 AM    HEMATOCRIT 42.9 08/11/2020 03:58 AM    MCV 93.5 08/11/2020 03:58 AM    MCH 30.1 08/11/2020 03:58 AM    MCHC 32.2 (L) 08/11/2020 03:58 AM    MPV 9.6 08/11/2020 03:58 AM      Lab Results   Component Value Date/Time    SODIUM 138 08/11/2020 08:45 AM    POTASSIUM 4.6 08/11/2020 08:45 AM    CHLORIDE 105 08/11/2020 08:45 AM    CO2 21 08/11/2020 08:45 AM    GLUCOSE 374 (H) 08/11/2020 08:45 AM    BUN 22 08/11/2020 08:45 AM    CREATININE 0.95 08/11/2020 08:45 AM    CREATININE 1.0 01/08/2009 04:31 PM      Lab Results   Component Value Date/Time    ASTSGOT 28 08/11/2020 08:45 AM    ALTSGPT 44 08/11/2020 08:45 AM     Lab Results   Component Value Date/Time    CHOLSTRLTOT 96 (L) 08/10/2020 05:03 AM    LDL 37 08/10/2020 05:03 AM    HDL 43 08/10/2020 05:03 AM    TRIGLYCERIDE 80 08/10/2020 05:03 AM    TROPONINT 24 (H) 08/09/2020 11:19 PM       No results for input(s): NTPROBNP in the last 72 hours.  (Above labs reviewed.)       Current Facility-Administered Medications:   •  aspirin EC (ECOTRIN) tablet 81 mg, 81 mg, Oral, DAILY, Jamie Peguero M.D., 81 mg at 08/11/20 0541  •  ticagrelor (BRILINTA) tablet 90 mg, 90 mg, Oral, BID, Jamie Peguero M.D., 90 mg at 08/11/20 0542  •  insulin regular (HumuLIN R,NovoLIN R) injection, 1-6 Units, Subcutaneous, 4X/DAY Shaq CERVANTES D.O., 2 Units at 08/11/20 1019  •  atorvastatin (LIPITOR) tablet 40 mg, 40 mg, Oral, Nightly, Xochitl Champion P.A.-C., 40 mg at 08/10/20 2118  •  levothyroxine (SYNTHROID) tablet 150 mcg, 150 mcg, Oral, AM ES, Francesca Cummings M.D., 150 mcg at 08/11/20 0540  •  omeprazole (PRILOSEC) capsule 20 mg, 20 mg, Oral, DAILY, Francesca Cummings M.D., 20 mg at 08/11/20 0540  •  nicotine (NICODERM)  14 MG/24HR 14 mg, 14 mg, Transdermal, DAILY AT NOON, Francesca Cummings M.D., 14 mg at 08/10/20 1110  •  hydrOXYzine HCl (ATARAX) tablet 25 mg, 25 mg, Oral, TID PRN, Francesca Cummings M.D.  •  cyanocobalamin (VITAMIN B-12) tablet 1,000 mcg, 1,000 mcg, Oral, DAILY, Francesca Cummings M.D., 1,000 mcg at 08/11/20 0540  •  insulin glargine (Lantus) injection, 15 Units, Subcutaneous, Q EVENING, Francesca Cummings M.D., 15 Units at 08/10/20 1808  •  metoprolol (LOPRESSOR) tablet 100 mg, 100 mg, Oral, TWICE DAILY, Manuel Green M.D., 100 mg at 08/11/20 0541  •  nitroglycerin (NITROSTAT) tablet 0.4 mg, 0.4 mg, Sublingual, Q5 MIN PRN, Manuel Green M.D., 0.4 mg at 08/09/20 1814  •  amLODIPine (NORVASC) tablet 10 mg, 10 mg, Oral, Q EVENING, Trino Carmona M.D., 10 mg at 08/10/20 1805  •  sucralfate (CARAFATE) tablet 1 g, 1 g, Oral, 4X/DAY ACHS, Trino Carmona M.D., 1 g at 08/10/20 2118  •  traZODone (DESYREL) tablet 100 mg, 100 mg, Oral, QHS PRN, Trino Carmona M.D., 100 mg at 08/10/20 2215  •  senna-docusate (PERICOLACE or SENOKOT S) 8.6-50 MG per tablet 2 Tab, 2 Tab, Oral, BID, Stopped at 08/10/20 0600 **AND** polyethylene glycol/lytes (MIRALAX) PACKET 1 Packet, 1 Packet, Oral, QDAY PRN **AND** magnesium hydroxide (MILK OF MAGNESIA) suspension 30 mL, 30 mL, Oral, QDAY PRN **AND** bisacodyl (DULCOLAX) suppository 10 mg, 10 mg, Rectal, QDAY PRN, Trino Carmona M.D.  •  Respiratory Therapy Consult, , Nebulization, Continuous RT, Trino Carmona M.D.  •  acetaminophen (TYLENOL) tablet 650 mg, 650 mg, Oral, Q6HRS PRN, Trino Carmona M.D., 650 mg at 08/10/20 2016  •  ondansetron (ZOFRAN) syringe/vial injection 4 mg, 4 mg, Intravenous, Q4HRS PRN, Trino Carmona M.D.  •  ondansetron (ZOFRAN ODT) dispertab 4 mg, 4 mg, Oral, Q4HRS PRN, Trino Carmona M.D.  •  promethazine (PHENERGAN) tablet 12.5-25 mg, 12.5-25 mg, Oral, Q4HRS PRN, Trino Carmona M.D.  •  promethazine  (PHENERGAN) suppository 12.5-25 mg, 12.5-25 mg, Rectal, Q4HRS PRN, Trino Carmona M.D.  •  [DISCONTINUED] insulin regular (HumuLIN R,NovoLIN R) injection, 1-6 Units, Subcutaneous, 4X/DAY ACHS, Stopped at 08/11/20 0700 **AND** POC Blood Glucose, , , Q AC AND BEDTIME(S) **AND** NOTIFY MD and PharmD, , , Once **AND** glucose 4 g chewable tablet 16 g, 16 g, Oral, Q15 MIN PRN, 16 g at 08/10/20 1117 **AND** dextrose 50% (D50W) injection 50 mL, 50 mL, Intravenous, Q15 MIN PRN, Trino Carmona M.D., 50 mL at 08/11/20 0318  (Medications reviewed.)    Cardiac Imaging and Procedures Review  CARDIAC STUDIES/PROCEDURES:    CARDIAC CATHETERIZATION CONCLUSIONS by Jamie Sears (08/10/20)  1. Unstable angina due to obstructive single vessel CAD  2. Successful PCI of the proximal through mid LAD using iFR guidance (3.0x26 [place] MINDY and 3.0x18 [place] MINDY)  3. Normal LVEDP  (study result reviewed)    ECHOCARDIOGRAM CONCLUSIONS (06/23/20)  Normal left ventricular systolic function.  Left ventricular ejection fraction is visually estimated to be 55%.  Normal right ventricular systolic function.  (study result reviewed)    EKG performed on (08/11/20) was reviewed: EKG personally interpreted shows sinus rhythm.    Assessment/Plan  1. Coronary artery disease with stent placement: She is clinically doing well without recurrence of her angina.  We will continue with current medical care including aspirin, ticagrelor, metoprolol and atorvastatin. She may be discharged to home today  2. Hypertension: Blood pressure is well controlled. We will continue with beta blockade therapy.  3. Hyperlipidemia: She is doing well on statin therapy without myalgia symptoms.    Thank you for allowing me to participate in the care of this patient.  I will continue to follow this patient    Please contact me with any questions.    Quinn Avila M.D.   Cardiologist, [facility]  [phone]

## 2020-08-11 NOTE — DISCHARGE INSTRUCTIONS
Discharge Instructions    Discharged to home by car with self. Discharged via wheelchair, hospital escort: Yes.  Special equipment needed: Not Applicable    Be sure to schedule a follow-up appointment with your primary care doctor or any specialists as instructed.     Discharge Plan:   Diet Plan: Discussed  Activity Level: Discussed  Smoking Cessation Offered: Patient Refused  Confirmed Follow up Appointment: Appointment Scheduled  Confirmed Symptoms Management: Discussed  Medication Reconciliation Updated: Yes    I understand that a diet low in cholesterol, fat, and sodium is recommended for good health. Unless I have been given specific instructions below for another diet, I accept this instruction as my diet prescription.   Other diet: Cardiac/Diabetic    Special Instructions: Diagnosis:  Acute Coronary Syndrome (ACS) is a diagnosis that encompasses cardiac-related chest pain and heart attack. ACS occurs when the blood flow to the heart muscle is severely reduced or cut off completely due to a slow process called atherosclerosis.  Atherosclerosis is a disease in which the coronary arteries become narrow from a buildup of fat, cholesterol, and other substances that combine to form plaque. If the plaque breaks, a blood clot will form and block the blood flow to the heart muscle. This lack of blood flow can cause damage or death to the heart muscle which is called a heart attack or Myocardial Infarction (MI). There are two different types of MIs:  ST Elevation Myocardial Infarction or STEMI (the most severe type of heart attack) and Non-ST Elevation Myocardial Infarction or NSTEMI.    Treatment Plan:  · Cardiac Diet  - Low fat, low salt, low cholesterol   · Cardiac Rehab  - Your doctor has ordered you a referral to [facility].  Call [phone] to schedule an appointment.  · Attend my follow-up appointment with my Cardiologist.  · Take my medications as prescribed by my doctor  · Exercise daily  · Quit  Smoking    Medications:  Certain medications are used to treat ACS.  Remember to always take medications as prescribed and never stop talking medications unless told by your doctor.    You have been prescribed the following medicatons:    Aspirin - Aspirin is used as a blood thinning medication and you will require this medication indefinitely.  Anti-platelet/blood thinner - Your Anti-platelet/Blood thinning medication is called Brilinta, and is used in combination with aspirin to prevent clots from forming in your heart and/or around your stent.  Your doctor will determine how long you need to be on this medicine.  Beta-Blocker - Beta-Blocker Metoprolol is used to lower blood pressure and heart rate, and/or helps your heart heal after a heart attack.  Statin - Statin Lipitor is used to lower cholesterol.  Nitroglycerine - Nitroglycerine is used to relieve chest pain.    · Is patient discharged on Warfarin / Coumadin?   No     Acute Coronary Syndrome  Acute coronary syndrome (ACS) is a serious problem in which there is suddenly not enough blood and oxygen reaching the heart. ACS can result in chest pain or a heart attack.  This condition is a medical emergency. If you have any symptoms of this condition, get help right away.  What are the causes?  This condition may be caused by:  · A buildup of fat and cholesterol inside the arteries (atherosclerosis). This is the most common cause. The buildup (plaque) can cause blood vessels in the heart (coronary arteries) to become narrow or blocked, which reduces blood flow to the heart. Plaque can also break off and lead to a clot, which can block an artery and cause a heart attack or stroke.  · Sudden tightening of the muscles around the coronary arteries (coronary spasm).  · Tearing of a coronary artery (spontaneous coronary artery dissection).  · Very low blood pressure (hypotension).  · An abnormal heartbeat (arrhythmia).  · Other medical conditions that cause a decrease  of oxygen to the heart, such as anemiaorrespiratory failure.  · Using cocaine or methamphetamine.  What increases the risk?  The following factors may make you more likely to develop this condition:  · Age. The risk for ACS increases as you get older.  · History of chest pain, heart attack, peripheral artery disease, or stroke.  · Having taken chemotherapy or immune-suppressing medicines.  · Being male.  · Family history of chest pain, heart disease, or stroke.  · Smoking.  · Not exercising enough.  · Being overweight.  · High cholesterol.  · High blood pressure (hypertension).  · Diabetes.  · Excessive alcohol use.  What are the signs or symptoms?  Common symptoms of this condition include:  · Chest pain. The pain may last a long time, or it may stop and come back (recur). It may feel like:  ? Crushing or squeezing.  ? Tightness, pressure, fullness, or heaviness.  · Arm, neck, jaw, or back pain.  · Heartburn or indigestion.  · Shortness of breath.  · Nausea.  · Sudden cold sweats.  · Light-headedness.  · Dizziness or passing out.  · Tiredness (fatigue).  Sometimes there are no symptoms.  How is this diagnosed?  This condition may be diagnosed based on:  · Your medical history and symptoms.  · Imaging tests, such as:  ? An electrocardiogram (ECG). This measures the heart's electrical activity.  ? X-rays.  ? CT scan.  ? A coronary angiogram. For this test, dye is injected into the heart arteries and then X-rays are taken.  ? Myocardial perfusion imaging. This test shows how well blood flows through your heart muscle.  · Blood tests. These may be repeated at certain time intervals.  · Exercise stress testing.  · Echocardiogram. This is a test that uses sound waves to produce detailed images of the heart.  How is this treated?  Treatment for this condition may include:  · Oxygen therapy.  · Medicines, such as:  ? Antiplatelet medicines and blood-thinning medicines, such as aspirin. These help prevent blood  clots.  ? Medicine that dissolves any blood clots (fibrinolytic therapy).  ? Blood pressure medicines.  ? Nitroglycerin. This helps widen blood vessels to improve blood flow.  ? Pain medicine.  ? Cholesterol-lowering medicine.  · Surgery, such as:  ? Coronary angioplasty with stent placement. This involves placing a small piece of metal that looks like mesh or a spring into a narrow coronary artery. This widens the artery and keeps it open.  ? Coronary artery bypass surgery. This involves taking a section of a blood vessel from a different part of your body and placing it on the blocked coronary artery to allow blood to flow around the blockage.  · Cardiac rehabilitation. This is a program that includes exercise training, education, and counseling to help you recover.  Follow these instructions at home:  Eating and drinking  · Eat a heart-healthy diet that includes whole grains, fruits and vegetables, lean proteins, and low-fat or nonfat dairy products.  · Limit how much salt (sodium) you eat as told by your health care provider. Follow instructions from your health care provider about any other eating or drinking restrictions, such as limiting foods that are high in fat and processed sugars.  · Use healthy cooking methods such as roasting, grilling, broiling, baking, poaching, steaming, or stir-frying.  · Work with a dietitian to follow a heart-healthy eating plan.  Medicines  · Take over-the-counter and prescription medicines only as told by your health care provider.  · Do not take these medicines unless your health care provider approves:  ? Vitamin supplements that contain vitamin A or vitamin E.  ? NSAIDs, such as ibuprofen, naproxen, or celecoxib.  ? Hormone replacement therapy that contains estrogen.  · If you are taking blood thinners:  ? Talk with your health care provider before you take any medicines that contain aspirin or NSAIDs. These medicines increase your risk for dangerous bleeding.  ? Take your  medicine exactly as told, at the same time every day.  ? Avoid activities that could cause injury or bruising, and follow instructions about how to prevent falls.  ? Wear a medical alert bracelet, and carry a card that lists what medicines you take.  Activity  · Follow your cardiac rehabilitation program. Do exercises as told by your physical therapist.  · Ask your health care provider what activities and exercises are safe for you. Follow his or her instructions about lifting, driving, or climbing stairs.  Lifestyle  · Do not use any products that contain nicotine or tobacco, such as cigarettes, e-cigarettes, and chewing tobacco. If you need help quitting, ask your health care provider.  · Do not drink alcohol if your health care provider tells you not to drink.  · If you drink alcohol:  ? Limit how much you have to 0-1 drink a day.  ? Be aware of how much alcohol is in your drink. In the [place] one drink equals one 12 oz bottle of beer (355 mL), one 5 oz glass of wine (148 mL), or one 1½ oz glass of hard liquor (44 mL).  · Maintain a healthy weight. If you need to lose weight, work with your health care provider to do so safely.  General instructions  · Tell all the health care providers who provide care for you about your heart condition, including your dentist. This may affect the medicines or treatment you receive.  · Manage any other health conditions you have, such as hypertension or diabetes. These conditions affect your heart.  · Pay attention to your mental health. You may be at higher risk for depression.  ? Find ways to manage stress.  ? Talk to your health care provider about depression screening and treatment.  · Keep your vaccinations up to date.  ? Get the flu shot (influenza vaccine) every year.  ? Get the pneumococcal vaccine if you are age 65 or older.  · If directed, monitor your blood pressure at home.  · Keep all follow-up visits as told by your health care provider. This is important.  Contact  a health care provider if you:  · Feel overwhelmed or sad.  · Have trouble doing your daily activities.  Get help right away if you:  · Have pain in your chest, neck, arm, jaw, stomach, or back that recurs, and:  ? It lasts for more than a few minutes.  ? It is not relieved by taking the medicineyour health care provider prescribed.  · Have unexplained:  ? Heavy sweating.  ? Heartburn or indigestion.  ? Nausea or vomiting.  ? Shortness of breath.  ? Difficulty breathing.  ? Fatigue.  ? Nervousness or anxiety.  ? Weakness.  ? Diarrhea.  ? Dark stools or blood in your stool.  · Have sudden light-headedness or dizziness.  · Have blood pressure that is higher than 180/120.  · Faint.  · Have thoughts about hurting yourself.  These symptoms may represent a serious problem that is an emergency. Do not wait to see if the symptoms will go away. Get medical help right away. Call your local emergency services (911 in the [place] Do not drive yourself to the hospital.   Summary  · Acute coronary syndrome (ACS) is when there is not enough blood and oxygen being supplied to the heart. ACS can result in chest pain or a heart attack.  · Acute coronary syndrome is a medical emergency. If you have any symptoms of this condition, get help right away.  · Treatment includes medicines and procedures to open the blocked arteries and restore blood flow.  This information is not intended to replace advice given to you by your health care provider. Make sure you discuss any questions you have with your health care provider.    Ticagrelor oral tablet  What is this medicine?  TICAGRELOR (AVILA ka GREL or) helps to prevent blood clots. This medicine is used to prevent heart attack, stroke, or other vascular events in people who have had a recent heart attack or who have severe chest pain.  This medicine may be used for other purposes; ask your health care provider or pharmacist if you have questions.  COMMON BRAND NAME(S): JENNIFER  What should I  tell my health care provider before I take this medicine?  They need to know if you have any of these conditions:  · bleeding disorders  · bleeding in the brain  · having surgery  · history of irregular heartbeat  · history of stomach bleeding  · liver disease  · an unusual or allergic reaction to ticagrelor, other medicines, foods, dyes, or preservatives  · pregnant or trying to get pregnant  · breast-feeding  How should I use this medicine?  Take this medicine by mouth with a glass of water. Follow the directions on the prescription label. You can take it with or without food. If it upsets your stomach, take it with food. Take your medicine at regular intervals. Do not take it more often than directed. Do not stop taking except on your doctor's advice.  Talk to your pediatrician regarding the use of this medicine in children. Special care may be needed.  Overdosage: If you think you have taken too much of this medicine contact a poison control center or emergency room at once.  NOTE: This medicine is only for you. Do not share this medicine with others.  What if I miss a dose?  If you miss a dose, take it as soon as you can. If it is almost time for your next dose, take only that dose. Do not take double or extra doses.  What may interact with this medicine?  Do not take this medicine with any of the following medications:  · defibrotide  · itraconazole  This medicine may also interact with the following medications:  · aspirin  · certain antibiotics like clarithromycin, telithromycin, and rifampin  · certain antiviral medicines for HIV or AIDS like atazanavir, indinavir, nelfinavir, ritonavir, and saquinavir  · certain medicines for cholesterol like lovastatin and simvastatin  · certain medicines for fungal infections like ketoconazole and voriconazole  · certain medicines for seizures like carbamazepine, phenobarbital, and phenytoin  · digoxin  · lovastatin  · narcotic medicines for pain  · nefazodone  This list  may not describe all possible interactions. Give your health care provider a list of all the medicines, herbs, non-prescription drugs, or dietary supplements you use. Also tell them if you smoke, drink alcohol, or use illegal drugs. Some items may interact with your medicine.  What should I watch for while using this medicine?  Visit your healthcare professional for regular checks on your progress. Your condition will be monitored carefully while you are receiving this medicine. It is important not to miss any appointments.  Notify your doctor or health care professional and seek emergency treatment if you develop breathing problems; changes in vision; chest pain; severe, sudden headache; pain, swelling, warmth in the leg; trouble speaking; sudden numbness or weakness of the face, arm, or leg. These can be signs that your condition has gotten worse.  If you are going to have surgery or dental work, tell your doctor or health care professional that you are taking this medicine.  You should take aspirin every day with this medicine. Do not take more than 100 mg each day. Talk to your healthcare professional if you have questions.  What side effects may I notice from receiving this medicine?  Side effects that you should report to your doctor or health care professional as soon as possible:  · allergic reactions like skin rash, itching or hives, swelling of the face, lips, or tongue  · breathing problems  · fast or irregular heartbeat  · feeling faint or light-headed, falls  · signs and symptoms of bleeding such as bloody or black, tarry stools; red or dark-brown urine; spitting up blood or brown material that looks like coffee grounds; red spots on the skin; unusual bruising or bleeding from the eye, gums, or nose  Side effects that usually do not require medical attention (report to your doctor or health care professional if they continue or are bothersome):  · breast enlargement in both males and  females  · diarrhea  · dizziness  · headache  · tiredness  · upset stomach  This list may not describe all possible side effects. Call your doctor for medical advice about side effects. You may report side effects to FDA at 9-584-CCP-2890.  Where should I keep my medicine?  Keep out of the reach of children.  Store at room temperature of 59 to 86 degrees F (15 to 30 degrees C). Throw away any unused medicine after the expiration date.  NOTE: This sheet is a summary. It may not cover all possible information. If you have questions about this medicine, talk to your doctor, pharmacist, or health care provider.  © 2020 [employer]/Gold Standard (2019-09-11 11:47:22)    Metoprolol tablets  What is this medicine?  METOPROLOL (me TOE proe lole) is a beta-blocker. Beta-blockers reduce the workload on the heart and help it to beat more regularly. This medicine is used to treat high blood pressure and to prevent chest pain. It is also used to after a heart attack and to prevent an additional heart attack from occurring.  This medicine may be used for other purposes; ask your health care provider or pharmacist if you have questions.  COMMON BRAND NAME(S): Lopressor  What should I tell my health care provider before I take this medicine?  They need to know if you have any of these conditions:  · diabetes  · heart or vessel disease like slow heart rate, worsening heart failure, heart block, sick sinus syndrome or Raynaud's disease  · kidney disease  · liver disease  · lung or breathing disease, like asthma or emphysema  · pheochromocytoma  · thyroid disease  · an unusual or allergic reaction to metoprolol, other beta-blockers, medicines, foods, dyes, or preservatives  · pregnant or trying to get pregnant  · breast-feeding  How should I use this medicine?  Take this medicine by mouth with a drink of water. Follow the directions on the prescription label. Take this medicine immediately after meals. Take your doses at regular  intervals. Do not take more medicine than directed. Do not stop taking this medicine suddenly. This could lead to serious heart-related effects.  Talk to your pediatrician regarding the use of this medicine in children. Special care may be needed.  Overdosage: If you think you have taken too much of this medicine contact a poison control center or emergency room at once.  NOTE: This medicine is only for you. Do not share this medicine with others.  What if I miss a dose?  If you miss a dose, take it as soon as you can. If it is almost time for your next dose, take only that dose. Do not take double or extra doses.  What may interact with this medicine?  This medicine may interact with the following medications:  · certain medicines for blood pressure, heart disease, irregular heart beat  · certain medicines for depression like monoamine oxidase (MAO) inhibitors, fluoxetine, or paroxetine  · clonidine  · dobutamine  · epinephrine  · isoproterenol  · reserpine  This list may not describe all possible interactions. Give your health care provider a list of all the medicines, herbs, non-prescription drugs, or dietary supplements you use. Also tell them if you smoke, drink alcohol, or use illegal drugs. Some items may interact with your medicine.  What should I watch for while using this medicine?  Visit your doctor or health care professional for regular check ups. Contact your doctor right away if your symptoms worsen. Check your blood pressure and pulse rate regularly. Ask your health care professional what your blood pressure and pulse rate should be, and when you should contact them.  You may get drowsy or dizzy. Do not drive, use machinery, or do anything that needs mental alertness until you know how this medicine affects you. Do not sit or stand up quickly, especially if you are an older patient. This reduces the risk of dizzy or fainting spells. Contact your doctor if these symptoms continue. Alcohol may  interfere with the effect of this medicine. Avoid alcoholic drinks.  This medicine may increase blood sugar. Ask your healthcare provider if changes in diet or medicines are needed if you have diabetes.  What side effects may I notice from receiving this medicine?  Side effects that you should report to your doctor or health care professional as soon as possible:  · allergic reactions like skin rash, itching or hives  · cold or numb hands or feet  · depression  · difficulty breathing  · faint  · fever with sore throat  · irregular heartbeat, chest pain  · rapid weight gain  ·   signs and symptoms of high blood sugar such as being more thirsty or hungry or having to urinate more than normal. You may also feel very tired or have blurry vision.  · swollen legs or ankles  Side effects that usually do not require medical attention (report to your doctor or health care professional if they continue or are bothersome):  · anxiety or nervousness  · change in sex drive or performance  · dry skin  · headache  · nightmares or trouble sleeping  · short term memory loss  · stomach upset or diarrhea  This list may not describe all possible side effects. Call your doctor for medical advice about side effects. You may report side effects to FDA at 4-398-FDA-8470.  Where should I keep my medicine?  Keep out of the reach of children.  Store at room temperature between 15 and 30 degrees C (59 and 86 degrees F). Throw away any unused medicine after the expiration date.  NOTE: This sheet is a summary. It may not cover all possible information. If you have questions about this medicine, talk to your doctor, pharmacist, or health care provider.  © 2020 [employer]/Gold Standard (2019-10-08 11:15:23)    Document Released: 12/18/2006 Document Revised: 12/30/2019 Document Reviewed: 12/30/2019  Elsevier Patient Education © 2020 [employer]

## 2020-08-11 NOTE — CARE PLAN
Problem: Communication  Goal: The ability to communicate needs accurately and effectively will improve  Outcome: MET     Problem: Safety  Goal: Will remain free from injury  Outcome: MET  Goal: Will remain free from falls  Outcome: MET     Problem: Infection  Goal: Will remain free from infection  Outcome: MET     Problem: Venous Thromboembolism (VTW)/Deep Vein Thrombosis (DVT) Prevention:  Goal: Patient will participate in Venous Thrombosis (VTE)/Deep Vein Thrombosis (DVT)Prevention Measures  Outcome: MET     Problem: Knowledge Deficit  Goal: Knowledge of disease process/condition, treatment plan, diagnostic tests, and medications will improve  Outcome: MET  Goal: Knowledge of the prescribed therapeutic regimen will improve  Outcome: MET     Problem: Discharge Barriers/Planning  Goal: Patient's continuum of care needs will be met  Outcome: MET     Problem: Pain Management  Goal: Pain level will decrease to patient's comfort goal  Outcome: MET     Problem: Psychosocial Needs:  Goal: Level of anxiety will decrease  Outcome: MET

## 2020-08-11 NOTE — PROGRESS NOTES
Bedside report received, patient laying in bed, tele monitor in place, on room air. RN assessed needs, no additional needs at this time. Call light within reach, patient verbalized the understanding on the need to call when needing assistance. Bed locked in lowest position, non skid socks on, bed rails up x2, hourly rounding in place.

## 2020-08-11 NOTE — THERAPY
Pt seen for PT cardiac rehab phase I barbie. She was receptive to education, but reported barriers to her success including being an active smoker as well as having chronic R hip pain for which she anticipates receiving an injection for soon. Discussed facets of OP cardiac rehab program including smoking cessation program. Pt interested and was also receptive to education regarding initiating a home walking program with parameters given by PT once her hip is less painful. Pt verbalized understanding to Talk Test, HR monitoring, RPE scale, and daily weights. No further acute PT required. Please refer pt to OP cardiac rehab when in appropriate stage of recovery.

## 2020-08-11 NOTE — PROGRESS NOTES
Upon entering patient room patient appeared diaphoretic, patient lethargic, RN checked fsbg: fsbg  17, IV dextrose given as well as x2 OJ with 4 packets of sugar, rechecked at 0331 fsbg 170 patient provided crackers, juice and food. Will recheck in 1 hr.

## 2020-08-12 NOTE — DISCHARGE PLANNING
Medication reconcilliation completed. Medications delivered to patient at bedside. Patient counseled.     Anu Manuel   Home Medication Instructions KENDALL:[id number]    Printed on:08/11/20 7100   Medication Information                      levothyroxine (SYNTHROID) 150 MCG Tab  Take 1 Tab by mouth Every morning on an empty stomach.             nitroglycerin (NITROSTAT) 0.4 MG SL Tab  Place 1 Tab under tongue as needed for Chest Pain.             ticagrelor (BRILINTA) 90 MG Tab tablet  Take 1 Tab by mouth 2 Times a Day.

## 2020-08-17 ENCOUNTER — HOSPITAL ENCOUNTER (OUTPATIENT)
Facility: MEDICAL CENTER | Age: 65
End: 2020-08-18
Attending: EMERGENCY MEDICINE | Admitting: HOSPITALIST
Payer: MEDICARE

## 2020-08-17 ENCOUNTER — APPOINTMENT (OUTPATIENT)
Dept: RADIOLOGY | Facility: MEDICAL CENTER | Age: 65
End: 2020-08-17
Attending: EMERGENCY MEDICINE
Payer: MEDICARE

## 2020-08-17 ENCOUNTER — APPOINTMENT (OUTPATIENT)
Dept: RADIOLOGY | Facility: MEDICAL CENTER | Age: 65
End: 2020-08-17
Payer: MEDICARE

## 2020-08-17 DIAGNOSIS — R07.9 ACUTE CHEST PAIN: ICD-10-CM

## 2020-08-17 LAB
ALBUMIN SERPL BCP-MCNC: 4 G/DL (ref 3.2–4.9)
ALBUMIN/GLOB SERPL: 1.5 G/DL
ALP SERPL-CCNC: 109 U/L (ref 30–99)
ALT SERPL-CCNC: 41 U/L (ref 2–50)
ANION GAP SERPL CALC-SCNC: 12 MMOL/L (ref 7–16)
AST SERPL-CCNC: 26 U/L (ref 12–45)
BASOPHILS # BLD AUTO: 0.7 % (ref 0–1.8)
BASOPHILS # BLD: 0.07 K/UL (ref 0–0.12)
BILIRUB SERPL-MCNC: 0.6 MG/DL (ref 0.1–1.5)
BUN SERPL-MCNC: 27 MG/DL (ref 8–22)
CALCIUM SERPL-MCNC: 9.4 MG/DL (ref 8.5–10.5)
CHLORIDE SERPL-SCNC: 104 MMOL/L (ref 96–112)
CO2 SERPL-SCNC: 21 MMOL/L (ref 20–33)
CREAT SERPL-MCNC: 1.08 MG/DL (ref 0.5–1.4)
D DIMER PPP IA.FEU-MCNC: 0.58 UG/ML (FEU) (ref 0–0.5)
EKG IMPRESSION: NORMAL
EOSINOPHIL # BLD AUTO: 0.15 K/UL (ref 0–0.51)
EOSINOPHIL NFR BLD: 1.5 % (ref 0–6.9)
ERYTHROCYTE [DISTWIDTH] IN BLOOD BY AUTOMATED COUNT: 45.3 FL (ref 35.9–50)
GLOBULIN SER CALC-MCNC: 2.7 G/DL (ref 1.9–3.5)
GLUCOSE BLD-MCNC: 204 MG/DL (ref 65–99)
GLUCOSE BLD-MCNC: 235 MG/DL (ref 65–99)
GLUCOSE SERPL-MCNC: 298 MG/DL (ref 65–99)
HCT VFR BLD AUTO: 34.8 % (ref 37–47)
HGB BLD-MCNC: 11.2 G/DL (ref 12–16)
IMM GRANULOCYTES # BLD AUTO: 0.03 K/UL (ref 0–0.11)
IMM GRANULOCYTES NFR BLD AUTO: 0.3 % (ref 0–0.9)
LYMPHOCYTES # BLD AUTO: 0.74 K/UL (ref 1–4.8)
LYMPHOCYTES NFR BLD: 7.4 % (ref 22–41)
MCH RBC QN AUTO: 30.4 PG (ref 27–33)
MCHC RBC AUTO-ENTMCNC: 32.2 G/DL (ref 33.6–35)
MCV RBC AUTO: 94.6 FL (ref 81.4–97.8)
MONOCYTES # BLD AUTO: 0.86 K/UL (ref 0–0.85)
MONOCYTES NFR BLD AUTO: 8.6 % (ref 0–13.4)
NEUTROPHILS # BLD AUTO: 8.13 K/UL (ref 2–7.15)
NEUTROPHILS NFR BLD: 81.5 % (ref 44–72)
NRBC # BLD AUTO: 0 K/UL
NRBC BLD-RTO: 0 /100 WBC
PLATELET # BLD AUTO: 307 K/UL (ref 164–446)
PMV BLD AUTO: 9.8 FL (ref 9–12.9)
POTASSIUM SERPL-SCNC: 4.3 MMOL/L (ref 3.6–5.5)
PROT SERPL-MCNC: 6.7 G/DL (ref 6–8.2)
RBC # BLD AUTO: 3.68 M/UL (ref 4.2–5.4)
SODIUM SERPL-SCNC: 137 MMOL/L (ref 135–145)
TROPONIN T SERPL-MCNC: 28 NG/L (ref 6–19)
TROPONIN T SERPL-MCNC: 30 NG/L (ref 6–19)
WBC # BLD AUTO: 10 K/UL (ref 4.8–10.8)

## 2020-08-17 PROCEDURE — 700111 HCHG RX REV CODE 636 W/ 250 OVERRIDE (IP): Performed by: HOSPITALIST

## 2020-08-17 PROCEDURE — 80053 COMPREHEN METABOLIC PANEL: CPT

## 2020-08-17 PROCEDURE — 700117 HCHG RX CONTRAST REV CODE 255: Performed by: EMERGENCY MEDICINE

## 2020-08-17 PROCEDURE — 99285 EMERGENCY DEPT VISIT HI MDM: CPT

## 2020-08-17 PROCEDURE — 96375 TX/PRO/DX INJ NEW DRUG ADDON: CPT

## 2020-08-17 PROCEDURE — 96372 THER/PROPH/DIAG INJ SC/IM: CPT | Mod: XU

## 2020-08-17 PROCEDURE — 36415 COLL VENOUS BLD VENIPUNCTURE: CPT

## 2020-08-17 PROCEDURE — G0378 HOSPITAL OBSERVATION PER HR: HCPCS

## 2020-08-17 PROCEDURE — C9803 HOPD COVID-19 SPEC COLLECT: HCPCS | Performed by: HOSPITALIST

## 2020-08-17 PROCEDURE — 71045 X-RAY EXAM CHEST 1 VIEW: CPT

## 2020-08-17 PROCEDURE — 93005 ELECTROCARDIOGRAM TRACING: CPT | Performed by: EMERGENCY MEDICINE

## 2020-08-17 PROCEDURE — 700111 HCHG RX REV CODE 636 W/ 250 OVERRIDE (IP): Performed by: EMERGENCY MEDICINE

## 2020-08-17 PROCEDURE — 85025 COMPLETE CBC W/AUTO DIFF WBC: CPT

## 2020-08-17 PROCEDURE — 700102 HCHG RX REV CODE 250 W/ 637 OVERRIDE(OP): Performed by: HOSPITALIST

## 2020-08-17 PROCEDURE — 84484 ASSAY OF TROPONIN QUANT: CPT

## 2020-08-17 PROCEDURE — 82962 GLUCOSE BLOOD TEST: CPT | Mod: 91

## 2020-08-17 PROCEDURE — 96374 THER/PROPH/DIAG INJ IV PUSH: CPT

## 2020-08-17 PROCEDURE — 85379 FIBRIN DEGRADATION QUANT: CPT

## 2020-08-17 PROCEDURE — 71275 CT ANGIOGRAPHY CHEST: CPT | Mod: ME

## 2020-08-17 PROCEDURE — 93005 ELECTROCARDIOGRAM TRACING: CPT

## 2020-08-17 PROCEDURE — 99214 OFFICE O/P EST MOD 30 MIN: CPT | Performed by: INTERNAL MEDICINE

## 2020-08-17 PROCEDURE — 99220 PR INITIAL OBSERVATION CARE,LEVL III: CPT | Performed by: HOSPITALIST

## 2020-08-17 PROCEDURE — A9270 NON-COVERED ITEM OR SERVICE: HCPCS | Performed by: HOSPITALIST

## 2020-08-17 RX ORDER — ACETAMINOPHEN 325 MG/1
650 TABLET ORAL EVERY 6 HOURS PRN
Status: DISCONTINUED | OUTPATIENT
Start: 2020-08-17 | End: 2020-08-18 | Stop reason: HOSPADM

## 2020-08-17 RX ORDER — AMLODIPINE BESYLATE 5 MG/1
10 TABLET ORAL EVERY EVENING
Status: DISCONTINUED | OUTPATIENT
Start: 2020-08-17 | End: 2020-08-18 | Stop reason: HOSPADM

## 2020-08-17 RX ORDER — ONDANSETRON 4 MG/1
4 TABLET, ORALLY DISINTEGRATING ORAL EVERY 4 HOURS PRN
Status: DISCONTINUED | OUTPATIENT
Start: 2020-08-17 | End: 2020-08-18 | Stop reason: HOSPADM

## 2020-08-17 RX ORDER — ASPIRIN 81 MG/1
324 TABLET, CHEWABLE ORAL DAILY
Status: DISCONTINUED | OUTPATIENT
Start: 2020-08-17 | End: 2020-08-18

## 2020-08-17 RX ORDER — ASPIRIN 300 MG/1
300 SUPPOSITORY RECTAL DAILY
Status: DISCONTINUED | OUTPATIENT
Start: 2020-08-17 | End: 2020-08-18

## 2020-08-17 RX ORDER — INSULIN GLARGINE 100 [IU]/ML
20 INJECTION, SOLUTION SUBCUTANEOUS EVERY EVENING
Status: ON HOLD | COMMUNITY
End: 2020-08-18

## 2020-08-17 RX ORDER — AMOXICILLIN 250 MG
2 CAPSULE ORAL 2 TIMES DAILY
Status: DISCONTINUED | OUTPATIENT
Start: 2020-08-17 | End: 2020-08-18 | Stop reason: HOSPADM

## 2020-08-17 RX ORDER — INSULIN GLARGINE 100 [IU]/ML
20 INJECTION, SOLUTION SUBCUTANEOUS
Status: DISCONTINUED | OUTPATIENT
Start: 2020-08-17 | End: 2020-08-18 | Stop reason: HOSPADM

## 2020-08-17 RX ORDER — ATORVASTATIN CALCIUM 20 MG/1
40 TABLET, FILM COATED ORAL EVERY EVENING
Status: DISCONTINUED | OUTPATIENT
Start: 2020-08-17 | End: 2020-08-18 | Stop reason: HOSPADM

## 2020-08-17 RX ORDER — PROMETHAZINE HYDROCHLORIDE 25 MG/1
12.5-25 SUPPOSITORY RECTAL EVERY 4 HOURS PRN
Status: DISCONTINUED | OUTPATIENT
Start: 2020-08-17 | End: 2020-08-18 | Stop reason: HOSPADM

## 2020-08-17 RX ORDER — MORPHINE SULFATE 4 MG/ML
4 INJECTION, SOLUTION INTRAMUSCULAR; INTRAVENOUS ONCE
Status: COMPLETED | OUTPATIENT
Start: 2020-08-17 | End: 2020-08-17

## 2020-08-17 RX ORDER — INSULIN GLARGINE 100 [IU]/ML
20 INJECTION, SOLUTION SUBCUTANEOUS EVERY EVENING
Status: DISCONTINUED | OUTPATIENT
Start: 2020-08-17 | End: 2020-08-17

## 2020-08-17 RX ORDER — LEVOTHYROXINE SODIUM 0.15 MG/1
150 TABLET ORAL
Status: DISCONTINUED | OUTPATIENT
Start: 2020-08-18 | End: 2020-08-18 | Stop reason: HOSPADM

## 2020-08-17 RX ORDER — ONDANSETRON 2 MG/ML
4 INJECTION INTRAMUSCULAR; INTRAVENOUS EVERY 4 HOURS PRN
Status: DISCONTINUED | OUTPATIENT
Start: 2020-08-17 | End: 2020-08-18 | Stop reason: HOSPADM

## 2020-08-17 RX ORDER — BISACODYL 10 MG
10 SUPPOSITORY, RECTAL RECTAL
Status: DISCONTINUED | OUTPATIENT
Start: 2020-08-17 | End: 2020-08-18 | Stop reason: HOSPADM

## 2020-08-17 RX ORDER — PROCHLORPERAZINE EDISYLATE 5 MG/ML
5-10 INJECTION INTRAMUSCULAR; INTRAVENOUS EVERY 4 HOURS PRN
Status: DISCONTINUED | OUTPATIENT
Start: 2020-08-17 | End: 2020-08-18 | Stop reason: HOSPADM

## 2020-08-17 RX ORDER — MORPHINE SULFATE 4 MG/ML
2-4 INJECTION, SOLUTION INTRAMUSCULAR; INTRAVENOUS
Status: DISCONTINUED | OUTPATIENT
Start: 2020-08-17 | End: 2020-08-18 | Stop reason: HOSPADM

## 2020-08-17 RX ORDER — PROMETHAZINE HYDROCHLORIDE 25 MG/1
12.5-25 TABLET ORAL EVERY 4 HOURS PRN
Status: DISCONTINUED | OUTPATIENT
Start: 2020-08-17 | End: 2020-08-18 | Stop reason: HOSPADM

## 2020-08-17 RX ORDER — ONDANSETRON 2 MG/ML
4 INJECTION INTRAMUSCULAR; INTRAVENOUS ONCE
Status: COMPLETED | OUTPATIENT
Start: 2020-08-17 | End: 2020-08-17

## 2020-08-17 RX ORDER — TRAZODONE HYDROCHLORIDE 50 MG/1
100 TABLET ORAL
Status: DISCONTINUED | OUTPATIENT
Start: 2020-08-17 | End: 2020-08-18 | Stop reason: HOSPADM

## 2020-08-17 RX ORDER — ASPIRIN 325 MG
325 TABLET ORAL DAILY
Status: DISCONTINUED | OUTPATIENT
Start: 2020-08-17 | End: 2020-08-18

## 2020-08-17 RX ORDER — METOPROLOL TARTRATE 50 MG/1
100 TABLET, FILM COATED ORAL 2 TIMES DAILY
Status: DISCONTINUED | OUTPATIENT
Start: 2020-08-17 | End: 2020-08-18 | Stop reason: HOSPADM

## 2020-08-17 RX ORDER — POLYETHYLENE GLYCOL 3350 17 G/17G
1 POWDER, FOR SOLUTION ORAL
Status: DISCONTINUED | OUTPATIENT
Start: 2020-08-17 | End: 2020-08-18 | Stop reason: HOSPADM

## 2020-08-17 RX ORDER — ENALAPRILAT 1.25 MG/ML
1.25 INJECTION INTRAVENOUS EVERY 6 HOURS PRN
Status: DISCONTINUED | OUTPATIENT
Start: 2020-08-17 | End: 2020-08-18 | Stop reason: HOSPADM

## 2020-08-17 RX ORDER — DEXTROSE MONOHYDRATE 25 G/50ML
50 INJECTION, SOLUTION INTRAVENOUS
Status: DISCONTINUED | OUTPATIENT
Start: 2020-08-17 | End: 2020-08-18 | Stop reason: HOSPADM

## 2020-08-17 RX ADMIN — ONDANSETRON 4 MG: 2 INJECTION INTRAMUSCULAR; INTRAVENOUS at 13:25

## 2020-08-17 RX ADMIN — ASPIRIN 325 MG: 325 TABLET, FILM COATED ORAL at 16:28

## 2020-08-17 RX ADMIN — NITROGLYCERIN 0.5 INCH: 20 OINTMENT TOPICAL at 17:47

## 2020-08-17 RX ADMIN — MORPHINE SULFATE 4 MG: 4 INJECTION INTRAVENOUS at 13:28

## 2020-08-17 RX ADMIN — ATORVASTATIN CALCIUM 40 MG: 20 TABLET, FILM COATED ORAL at 17:47

## 2020-08-17 RX ADMIN — METOPROLOL TARTRATE 100 MG: 50 TABLET, FILM COATED ORAL at 17:47

## 2020-08-17 RX ADMIN — INSULIN HUMAN 3 UNITS: 100 INJECTION, SOLUTION PARENTERAL at 17:44

## 2020-08-17 RX ADMIN — IOHEXOL 75 ML: 350 INJECTION, SOLUTION INTRAVENOUS at 14:15

## 2020-08-17 RX ADMIN — AMLODIPINE BESYLATE 10 MG: 5 TABLET ORAL at 17:46

## 2020-08-17 RX ADMIN — TICAGRELOR 90 MG: 90 TABLET ORAL at 17:47

## 2020-08-17 RX ADMIN — INSULIN GLARGINE 20 UNITS: 100 INJECTION, SOLUTION SUBCUTANEOUS at 20:58

## 2020-08-17 RX ADMIN — MORPHINE SULFATE 2 MG: 4 INJECTION INTRAVENOUS at 16:28

## 2020-08-17 ASSESSMENT — LIFESTYLE VARIABLES
HAVE PEOPLE ANNOYED YOU BY CRITICIZING YOUR DRINKING: NO
ALCOHOL_USE: NO
TOTAL SCORE: 0
TOTAL SCORE: 0
EVER_SMOKED: YES
DOES PATIENT WANT TO TALK TO SOMEONE ABOUT QUITTING: NO
HOW MANY TIMES IN THE PAST YEAR HAVE YOU HAD 5 OR MORE DRINKS IN A DAY: 0
TOTAL SCORE: 0
ON A TYPICAL DAY WHEN YOU DRINK ALCOHOL HOW MANY DRINKS DO YOU HAVE: 0
AVERAGE NUMBER OF DAYS PER WEEK YOU HAVE A DRINK CONTAINING ALCOHOL: 0
CONSUMPTION TOTAL: NEGATIVE
DOES PATIENT WANT TO STOP DRINKING: NO
EVER HAD A DRINK FIRST THING IN THE MORNING TO STEADY YOUR NERVES TO GET RID OF A HANGOVER: NO
EVER FELT BAD OR GUILTY ABOUT YOUR DRINKING: NO
HAVE YOU EVER FELT YOU SHOULD CUT DOWN ON YOUR DRINKING: NO

## 2020-08-17 ASSESSMENT — COPD QUESTIONNAIRES
DURING THE PAST 4 WEEKS HOW MUCH DID YOU FEEL SHORT OF BREATH: SOME OF THE TIME
IN THE PAST 12 MONTHS DO YOU DO LESS THAN YOU USED TO BECAUSE OF YOUR BREATHING PROBLEMS: DISAGREE/UNSURE
COPD SCREENING SCORE: 6
DO YOU EVER COUGH UP ANY MUCUS OR PHLEGM?: YES, A FEW DAYS A WEEK OR MONTH
HAVE YOU SMOKED AT LEAST 100 CIGARETTES IN YOUR ENTIRE LIFE: YES

## 2020-08-17 ASSESSMENT — ENCOUNTER SYMPTOMS
DIARRHEA: 0
BLOOD IN STOOL: 0
HEADACHES: 0
BACK PAIN: 0
SHORTNESS OF BREATH: 1
FEVER: 0
SPEECH CHANGE: 0
STRIDOR: 0
COUGH: 0
SORE THROAT: 0
CHILLS: 0
PALPITATIONS: 0
NAUSEA: 0
NERVOUS/ANXIOUS: 0
ABDOMINAL PAIN: 0
DIZZINESS: 0
EYE DISCHARGE: 0
VOMITING: 0

## 2020-08-17 ASSESSMENT — FIBROSIS 4 INDEX
FIB4 SCORE: 0.98
FIB4 SCORE: 0.83
FIB4 SCORE: 0.83

## 2020-08-17 ASSESSMENT — COGNITIVE AND FUNCTIONAL STATUS - GENERAL
SUGGESTED CMS G CODE MODIFIER DAILY ACTIVITY: CH
SUGGESTED CMS G CODE MODIFIER MOBILITY: CH
DAILY ACTIVITIY SCORE: 24
MOBILITY SCORE: 24

## 2020-08-17 NOTE — ED PROVIDER NOTES
ED Provider Note    Scribed for Sirisha Marquez M.D. by Davin Salinas. 8/17/2020  11:27 AM    Primary care provider: Jarrell Yates M.D.  Means of arrival: Walk in  History obtained from: Patient  History limited by: None    CHIEF COMPLAINT  Chief Complaint   Patient presents with   • Chest Pain     sudden onset last night while sleeping, pain from R side, radiatin gto L side , neck and L arm   • Shortness of Breath     speaking in full sentences       HPI  Anu Manuel is a 65 y.o. female who presents for evaluation of constant, moderate to severe, dull chest pain onset 3:30 AM this morning. She reports chest pain onset suddenly last night while she was sleeping. She states chest pain was initially right sided, with radiating stabbing pain into her neck, pain into her left arm and pain near her left shoulder blade. She notes current pain is sternal and dull. She says pain is mildly improved since onset. She notes she felt okay prior to going to bed. She admits to additional symptoms of mild shortness of breath, nausea, mild lower extremity swelling (yesterday), and mild light headedness, but denies diaphoresis, coughing, hemoptysis, sputum production, vomiting, calf pain, or abdominal pain. She denies alleviating factors. She says she had 2 stents placed 2 weeks ago for unstable angina. She notes the only symptoms she experienced from this were palpitations. She has follow up with her Cardiologist scheduled for 8/19/20. She says current pain is different than past cardiac symptoms. She states she is compliant with all of her prescribed medications. She adds history of type 1 diabetes, which she is currently on Lantus for. She says blood sugars have been running in 200-250's, and says her diabetes is poorly controlled.     PPE Note: I personally donned full PPE for all patient encounters during this visit, with an N95 respirator mask, gloves, and eye protection.     Scribe remained outside the patient's  "room and did not have any contact with the patient for the duration of patient encounter.      REVIEW OF SYSTEMS  Pertinent positives include chest pain, radiating pain into the neck, left arm and near her left shoulder blade, mild shortness of breath, nausea, mild lower extremity swelling (yesterday), and mild light headedness.   Pertinent negatives include no diaphoresis, coughing, hemoptysis, sputum production, vomiting, calf pain, or abdominal pain.    See HPI for further details. All other systems are negative.    PAST MEDICAL HISTORY  Past Medical History:   Diagnosis Date   • Adverse effect of anesthesia     in 2008 \"throat closes up\"\"anxiety\" ?laryngospasm, kept in ICU. Pt states no problems currently 2018.    • Anesthesia     in 2008 \"throat closes up\"\"anxiety\" ?laryngospasm, kept in ICU. Pt states no problems currently 2018.    • Arthritis     right shoulder, hands   • Cigarette smoker quit 2013   • Dental disorder     missing teeth    • depression 8/30/2016    denies depression, states has anxiety and panic attacks   • Diabetes mellitus type 1 ([facility]) 1989    insulin   • Encounter for long-term (current) use of insulin ([facility]) 9/25/2013   • Heart burn    • High cholesterol    • Hypertension    • Hypothyroidism, postsurgical 1970   • Indigestion    • Infectious disease      had hepatitis C, tested neg.   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.63) 6/10/2015       FAMILY HISTORY  Family History   Problem Relation Age of Onset   • Hypertension Mother    • Cancer Father        SOCIAL HISTORY  Social History     Socioeconomic History   • Marital status:    Social Needs   • Financial resource strain: Not hard at all   • Food insecurity     Worry: Never true     Inability: Never true   • " Transportation needs     Medical: Yes     Non-medical: Yes   Tobacco Use   • Smoking status: Current Every Day Smoker     Packs/day: 1.00     Years: 30.00     Pack years: 30.00     Types: Cigarettes   • Smokeless tobacco: Never Used   • Tobacco comment: 1 ppd    Substance and Sexual Activity   • Alcohol use: No     Comment:     • Drug use: No   Lifestyle   • Physical activity     Days per week: 0 days     Minutes per session: 0 min   Relationships   • Social connections     Talks on phone: More than three times a week     Gets together: More than three times a week     Attends Cheondo[employer] service: Never     Active member of club or organization: No     Attends meetings of clubs or organizations: Never     Relationship status:    • Intimate partner violence     Fear of current or ex partner: No     Emotionally abused: No     Physically abused: No     Forced sexual activity: No       SURGICAL HISTORY  Past Surgical History:   Procedure Laterality Date   • CATH PLACEMENT  1/25/2020    Procedure: INSERTION, CATHETER PERM;  Surgeon: Rola Mendoza M.D.;  Location: [facility];  Service: General   • IRRIGATION & DEBRIDEMENT NEURO  1/19/2020    Procedure: IRRIGATION AND DEBRIDEMENT, WOUND THORACIC AND LUMBAR;  Surgeon: Ryan Roman M.D.;  Location: [facility];  Service: Neurosurgery   • PB IMPLANT NEUROSTIM/ N/A 12/16/2019    Procedure: EXPLORATION AT THORACIC 8 - 9, REPLACEMENT OF  NEUROSTIMULATOR LEAD;  Surgeon: Ryan Roman M.D.;  Location: [facility];  Service: Neurosurgery   • SPINAL CORD STIMULATOR N/A 10/26/2018    Procedure: SPINAL CORD STIMULATOR;  Surgeon: Ryan Roman M.D.;  Location: [facility];  Service: Neurosurgery   • THORACIC LAMINECTOMY N/A 10/26/2018    Procedure: THORACIC LAMINECTOMY - FOR;  Surgeon: Ryan Roman M.D.;  Location: [facility];  Service: Neurosurgery   • PB INJ,FORAMEN,L/S,1 LEVEL Right  8/31/2016    Procedure: INJ-FORAMEN EPI LUM/SAC SNGL L4-5;  Surgeon: Sukhi Godfrey M.D.;  Location: SURGERY [facility];  Service: Pain Management   • LUMBAR LAMINECTOMY DISKECTOMY Right 5/10/2016    Procedure: LUMBAR L4-5 HEMILAMINECTOMY DISKECTOMY ;  Surgeon: Arnold Keyes M.D.;  Location: SURGERY [facility];  Service:    • CERVICAL FUSION POSTERIOR  1/16/2009    Performed by TARA CONTRERAS at SURGERY [facility]S   • HARDWARE REMOVAL NEURO  1/16/2009    Performed by TARA CONTRERAS at SURGERY [facility]S   • NECK EXPLORATION  1/16/2009    Performed by TARA CONTRERAS at [facility]   • SHOULDER ARTHROSCOPY W/ ROTATOR CUFF REPAIR  10/9/08    Performed by SHERLY CASTANEDA at [facility]   • SHOULDER DECOMPRESSION ARTHROSCOPIC  6/17/08    Performed by SHERLY CASTANEDA at [facility]   • CLAVICLE DISTAL EXCISION  6/17/08    Performed by SHERLY CASTANEDA at [facility]   • CERVICAL DISK AND FUSION ANTERIOR  03/12/08   • HYSTERECTOMY, VAGINAL  2006   • APPENDECTOMY  2004   • THYROID LOBECTOMY  1973   • TONSILLECTOMY  1963   • ABDOMINAL HYSTERECTOMY TOTAL     • LUMPECTOMY  1976, 2005    Breast    • LUMPECTOMY         CURRENT MEDICATIONS  Current Outpatient Medications   Medication Instructions   • amLODIPine (NORVASC) 10 mg, Oral, EVERY EVENING   • aspirin 81 mg, Oral, DAILY   • atorvastatin (LIPITOR) 40 mg, Oral, EVERY EVENING   • Brilinta 90 mg, Oral, 2 TIMES DAILY   • insulin aspart (NOVOLOG) 2-10 Units, Subcutaneous, 3 TIMES DAILY BEFORE MEALS, 1 units for every 5 g of carbs <BR>AND<BR>Sliding Scale <BR>150 - 200 = 1 units<BR>201 - 250 = 2 units<BR>251 - 300 = 3 units<BR>301 - 350 = 4 units<BR>351 - 400 = 5 units   • levothyroxine (SYNTHROID) 150 mcg, Oral, EACH MORNING ON EMPTY STOMACH   • metoprolol (LOPRESSOR) 100 mg, Oral, 2 TIMES DAILY   • nitroglycerin (NITROSTAT) 0.4 mg, Sublingual, PRN   • pyridoxine (VITAMIN B-6) 50 mg, Oral, EVERY MORNING   •  "sucralfate (CARAFATE) 1 g, Oral, 4 TIMES DAILY - BEFORE MEALS , NIGHTLY   • traZODone (DESYREL) 100 mg, Oral, EVERY BEDTIME PRN   • Tresiba 15 Units, Subcutaneous, EVERY EVENING       ALLERGIES  Allergies   Allergen Reactions   • Ativan Unspecified      Extreme Restlessness with whole body  OJZ=1995   • Tape      Blisters, paper tape is ok       PHYSICAL EXAM  VITAL SIGNS: /65   Pulse 67   Temp 36.4 °C (97.5 °F) (Temporal)   Resp 16   Ht 1.676 m (5' 6\")   Wt 70.5 kg (155 lb 6.8 oz)   LMP 04/29/2005 (LMP Unknown)   SpO2 97%   BMI 25.09 kg/m²      Constitutional: Well developed, well nourished; No acute distress; Non-toxic appearance.   HENT: Normocephalic, atraumatic; Bilateral external ears normal; Oropharynx with moist mucous membranes; No erythema or exudates in the posterior oropharynx.   Eyes: PERRL, EOMI, Conjunctiva normal. No discharge.   Neck:  Supple, nontender midline; No stridor; No nuchal rigidity.   Lymphatic: No cervical lymphadenopathy noted.   Cardiovascular: Regular rate and rhythm without murmurs, rubs, or gallop.   Thorax & Lungs: No respiratory distress, breath sounds clear to auscultation bilaterally without wheezing, rales or rhonchi. Nontender chest wall. No crepitus or subcutaneous air  Abdomen: Soft, nontender, bowel sounds normal. No obvious masses; No pulsatile masses; no rebound, guarding, or peritoneal signs.   Skin: Good color; warm and dry without rash or petechia.  Back: Nontender, No CVA tenderness.   Extremities: Trace pretibial edema. 1+ pedal pulses. Distal radial, dorsalis pedis, posterior tibial pulses are equal bilaterally; Nontender calves or saphenous, No cyanosis, No clubbing.   Musculoskeletal: Good range of motion in all major joints. No tenderness to palpation or major deformities noted.   Neurologic: Alert & oriented x 4, clear speech.    EKG  12 lead EKG interpreted by me as below    LABS/RADIOLOGY/PROCEDURES  Results for orders placed or performed during " the hospital encounter of 08/17/20   CBC with Differential   Result Value Ref Range    WBC 10.0 4.8 - 10.8 K/uL    RBC 3.68 (L) 4.20 - 5.40 M/uL    Hemoglobin 11.2 (L) 12.0 - 16.0 g/dL    Hematocrit 34.8 (L) 37.0 - 47.0 %    MCV 94.6 81.4 - 97.8 fL    MCH 30.4 27.0 - 33.0 pg    MCHC 32.2 (L) 33.6 - 35.0 g/dL    RDW 45.3 35.9 - 50.0 fL    Platelet Count 307 164 - 446 K/uL    MPV 9.8 9.0 - 12.9 fL    Neutrophils-Polys 81.50 (H) 44.00 - 72.00 %    Lymphocytes 7.40 (L) 22.00 - 41.00 %    Monocytes 8.60 0.00 - 13.40 %    Eosinophils 1.50 0.00 - 6.90 %    Basophils 0.70 0.00 - 1.80 %    Immature Granulocytes 0.30 0.00 - 0.90 %    Nucleated RBC 0.00 /100 WBC    Neutrophils (Absolute) 8.13 (H) 2.00 - 7.15 K/uL    Lymphs (Absolute) 0.74 (L) 1.00 - 4.80 K/uL    Monos (Absolute) 0.86 (H) 0.00 - 0.85 K/uL    Eos (Absolute) 0.15 0.00 - 0.51 K/uL    Baso (Absolute) 0.07 0.00 - 0.12 K/uL    Immature Granulocytes (abs) 0.03 0.00 - 0.11 K/uL    NRBC (Absolute) 0.00 K/uL   Complete Metabolic Panel (CMP)   Result Value Ref Range    Sodium 137 135 - 145 mmol/L    Potassium 4.3 3.6 - 5.5 mmol/L    Chloride 104 96 - 112 mmol/L    Co2 21 20 - 33 mmol/L    Anion Gap 12.0 7.0 - 16.0    Glucose 298 (H) 65 - 99 mg/dL    Bun 27 (H) 8 - 22 mg/dL    Creatinine 1.08 0.50 - 1.40 mg/dL    Calcium 9.4 8.5 - 10.5 mg/dL    AST(SGOT) 26 12 - 45 U/L    ALT(SGPT) 41 2 - 50 U/L    Alkaline Phosphatase 109 (H) 30 - 99 U/L    Total Bilirubin 0.6 0.1 - 1.5 mg/dL    Albumin 4.0 3.2 - 4.9 g/dL    Total Protein 6.7 6.0 - 8.2 g/dL    Globulin 2.7 1.9 - 3.5 g/dL    A-G Ratio 1.5 g/dL   Troponin   Result Value Ref Range    Troponin T 30 (H) 6 - 19 ng/L   D-DIMER   Result Value Ref Range    D-Dimer Screen 0.58 (H) 0.00 - 0.50 ug/mL (FEU)   ESTIMATED GFR   Result Value Ref Range    GFR If African American >60 >60 mL/min/1.73 m 2    GFR If Non  51 (A) >60 mL/min/1.73 m 2   EKG (NOW)   Result Value Ref Range    Report       [facility]  [place] [facility].    Test Date:  2020  Pt Name:    KALPANA BUTTS               Department: ER  MRN:        [id number]                      Room:  Gender:     Female                       Technician: [id number]  :        [id number]                   Requested By:ER TRIAGE PROTOCOL  Order #:    [id number]                    Reading MD: Sirisha Marquez    Measurements  Intervals                                Axis  Rate:       64                           P:          69  IL:         140                          QRS:        43  QRSD:       90                           T:          54  QT:         384  QTc:        396    Interpretive Statements  SINUS RHYTHM rate 64  Normal intervals  Normal axis  No ST elevation or depression  BASELINE WANDER IN LEAD(S) V6  Compared to ECG 2020 03:13:13  No significant changes  Electronically Signed On 2020 14:52:49 PDT by Sirisha Marquez            CT-CTA CHEST PULMONARY ARTERY W/ RECONS   Final Result      1.  No pulmonary embolus. No acute abnormality in the chest.   2.  Emphysema.   3.  Incidental 6.5 mm nodule in the anterior inferior aspect of the right upper lobe is stable since prior study. Follow-up guidelines for high and low risk patients are outlined below.      Pulmonary nodule guidelines:      Low Risk: CT at 6-12 months, then consider CT at 18-24 months      High Risk: CT at 6-12 months, then CT at 18-24 months      Low Risk - Minimal or absent history of smoking and of other known risk factors.      High Risk - History of smoking or of other known risk factors.      Note: These recommendations do not apply to lung cancer screening, patients with immunosuppression, or patients with known primary cancer.      Fleischner Society 2017 Guidelines for Management of Incidentally Detected Pulmonary Nodules in Adults               DX-CHEST-PORTABLE (1 VIEW)   Final Result      No acute cardiopulmonary disease.          COURSE & MEDICAL DECISION MAKING  Pertinent Labs &  Imaging studies reviewed. (See chart for details)    Reviewed patient's old medical records which showed she had a cardiac catheterization on 8/8/20 for unstable angina, due obstructive single vessel CAV, with successful PCI of the proximal through the mid LAD.     11:27 AM - Patient seen and examined at bedside. Discussed plan of care, including labs and imaging. Patient agrees to the plan of care. Ordered for labs and imaging to evaluate her symptoms.     12:32 PM - Paged Cardiology.      12:34 PM - I discussed the patient's case and the above findings with Dr. Day (Cardiology) who says, after reviewing her recent CT scans of the heart, the aorta looked fine. He thinks that dissection would be unlikely with recent catheterization and this would be low on the differential. He recommended ruling her out for PE and have the patient admitted for observation and serial troponin.     2:38 PM - Paged Hospitalist.      2:48 PM - I discussed the patient's case and the above findings with Dr. Agrawal (Hospitalist) who will assign a Hospitalist to assess the patient for hospitalization. He requested the patient be evaluated by Cardiology at bedside.     2:53 PM - Paged Dr. Day (Cardiology).      3:12 PM - I discussed the patient's case and the above findings with Dr. Day (Cardiology) who informed me he has seen the patient at bedside and there is already a note in the chart.       Patient presents to the ER with complaint of a sudden onset of chest pain which woke her from sleep about 330 this morning.  She describes it as a sharp pain at onset which started in her right neck area and then radiating down into her substernal region and into her left chest.  She said the pain somewhat radiated into her back as well.  The sharp quality of the pain seems to have subsided a bit and now it is just dull in nature.  No longer does she have radiating pain to her back.  She says she feels short of breath.  The pain  is slightly pleuritic.  No cough.  No hemoptysis.  She does have some lower extremity swelling which she says has been present since she got her cardiac catheterization.  Patient's EKG is nonacute.  Her troponin was minimally elevated.  I spoke with  and asked if there was anything specific I needed to be worried about given her sudden onset of chest pain 1 week post cardiac catheterization.  He did not think that aortic dissection was likely.  He reviewed her most recent CTA of the heart and did not see any abnormalities on the aorta that would put her at risk for dissection.  Additionally, her blood pressure is not high.  And finally, with an uncomplicated cardiac cath, aortic dissection is unlikely anyways.  With her d-dimer being minimally elevated and the fact that she was recently in the hospital with the procedure, he felt that doing a PE study was more appropriate than doing an aortic study.  CT of the chest PE protocol was ordered.  There is no pulmonary embolism.  She did have an incidental nodule which was seen and noted to be stable.  I inadvertently forgot to mention this to the patient, but Dr. Carranza, hospitalist, told me that he talk to the patient about the nodule and the importance of follow-up.  With respect to the patient's pain, at this time there is no evidence of STEMI or non-STEMI.  Low suspicion for dissection.  No concern for PE as her CT scan is negative.  She has no abdominal pain or tenderness.  I do not think this is gallbladder pathology.  Lipase is normal.  No pancreatitis.  Is unclear at this time with causing her pain.  Dr. Melendez recommended that the patient be hospitalized under the care of the hospitalist service for observation overnight.  He has placed a consult note in the chart.  I spoke with the hospitalist on-call, Dr. mcclellan, and he will kindly asked 1 of his colleagues to evaluate the patient hospitalization.    DISPOSITION:  Patient will be hospitalized by  Dr. Carranza in guarded condition.     FINAL IMPRESSION  1. Acute chest pain Acute      This dictation has been created using voice recognition software. The accuracy of the dictation is limited by the abilities of the software. I expect there may be some errors of grammar and possibly content. I made every attempt to manually correct the errors within my dictation. However, errors related to voice recognition software may still exist and should be interpreted within the appropriate context.      I, Davin Salinas (Susan), am scribing for, and in the presence of, Sirisha Marquez M.D..    Electronically signed by: Davin Salinas (Susan), 8/17/2020    ISirisha M.D. personally performed the services described in this documentation, as scribed by Davin Salinas in my presence, and it is both accurate and complete.    The note accurately reflects work and decisions made by me.  Sirisha Marquez M.D.  8/17/2020  7:18 PM

## 2020-08-17 NOTE — H&P
Hospital Medicine History & Physical Note    Date of Service  8/17/2020    Primary Care Physician  Jarrell Yates M.D.    Consultants  Cardiology    Code Status  Full Code    Chief Complaint  Chief Complaint   Patient presents with   • Chest Pain     sudden onset last night while sleeping, pain from R side, radiatin gto L side , neck and L arm   • Shortness of Breath     speaking in full sentences       History of Presenting Illness  65 y.o. female with a history of hypertension, ongoing tobacco use, coronary artery disease with a recent stent approximately 1 week ago to the LAD, diabetes mellitus, dyslipidemia who presented 8/17/2020 with chest pain midsternum radiation to the jaw and left upper shoulder.  Initial EKG is unremarkable.  Her troponin is mildly elevated.  Patient's describes her pain as a tightness.  Denies any pleuritic component with deep inspiratory effort.  She states it is slightly reproducible when she was pressing on her sternum.  Patient denies any swelling of her legs.  She has been taking her medications routinely.  She continues to smoke and we discussed cessation.  Per cardiologist who has seen the patient already they did not feel that this was occlusion of the stent based on EKG.  They recommend continue medications.  Checking echocardiogram.    Review of Systems  Review of Systems   Constitutional: Negative for chills and fever.   HENT: Negative for sore throat.    Eyes: Negative for discharge.   Respiratory: Positive for shortness of breath. Negative for cough and stridor.    Cardiovascular: Positive for chest pain. Negative for palpitations and leg swelling.   Gastrointestinal: Negative for abdominal pain, blood in stool, diarrhea, melena, nausea and vomiting.   Genitourinary: Negative for dysuria and hematuria.   Musculoskeletal: Negative for back pain and joint pain.   Neurological: Negative for dizziness, speech change and headaches.   Psychiatric/Behavioral: The patient is not  nervous/anxious.        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    Surgical History   has a past surgical history that includes hysterectomy, vaginal (2006); thyroid lobectomy (1973); lumpectomy (1976, 2005); cervical disk and fusion anterior (03/12/08); tonsillectomy (1963); cervical fusion posterior (1/16/2009); hardware removal neuro (1/16/2009); neck exploration (1/16/2009); abdominal hysterectomy total; lumpectomy; lumbar laminectomy diskectomy (Right, 5/10/2016); shoulder decompression arthroscopic (6/17/08); clavicle distal excision (6/17/08); shoulder arthroscopy w/ rotator cuff repair (10/9/08); pr inj,foramen,l/s,1 level (Right, 8/31/2016); spinal cord stimulator (N/A, 10/26/2018); thoracic laminectomy (N/A, 10/26/2018); appendectomy (2004); pr implant neurostim/ (N/A, 12/16/2019); irrigation & debridement neuro (1/19/2020); and cath placement (1/25/2020).     Family History  family history includes Cancer in her father; Hypertension in her mother.     Social History   reports that she has been smoking cigarettes. She has a 30.00 pack-year smoking history. She has never used smokeless tobacco. She reports that she does not drink alcohol or use drugs.    Allergies  Allergies   Allergen Reactions   • Ativan Unspecified      Extreme Restlessness with whole body  QAM=8808   • Tape      Blisters, paper tape is ok       Medications  Prior to  Admission Medications   Prescriptions Last Dose Informant Patient Reported? Taking?   Insulin Degludec (TRESIBA) 100 UNIT/ML Solution 8/8/2020 at OUT Patient No No   Sig: Inject 15 Units as instructed every evening.   amLODIPine (NORVASC) 10 MG Tab 8/16/2020 at PM Patient No No   Sig: Take 1 Tab by mouth every evening.   aspirin EC 81 MG EC tablet 8/17/2020 at AM Patient Yes No   Sig: Take 81 mg by mouth every morning.   atorvastatin (LIPITOR) 40 MG Tab 8/16/2020 at PM Patient No No   Sig: Take 1 Tab by mouth every evening.   insulin aspart (NOVOLOG) 100 UNIT/ML Solution 8/16/2020 at 1000 Patient Yes No   Sig: Inject 2-10 Units as instructed 3 times a day before meals. 1 units for every 5 g of carbs   AND  Sliding Scale   150 - 200 = 1 units  201 - 250 = 2 units  251 - 300 = 3 units  301 - 350 = 4 units  351 - 400 = 5 units   insulin glargine (LANTUS) 100 UNIT/ML Solution 8/16/2020 at PM Patient Yes Yes   Sig: Inject 20 Units as instructed every evening.   levothyroxine (SYNTHROID) 150 MCG Tab 8/17/2020 at AM Patient No No   Sig: Take 1 Tab by mouth Every morning on an empty stomach.   metoprolol (LOPRESSOR) 100 MG Tab 8/17/2020 at AM Patient No No   Sig: Take 1 Tab by mouth 2 Times a Day.   sucralfate (CARAFATE) 1 GM Tab 8/17/2020 at AM Patient Yes No   Sig: Take 1 g by mouth 4 Times a Day,Before Meals and at Bedtime.   ticagrelor (BRILINTA) 90 MG Tab tablet 8/17/2020 at AM Patient No No   Sig: Take 1 Tab by mouth 2 Times a Day.   traZODone (DESYREL) 100 MG Tab > 3 DAYS at PRN Patient Yes No   Sig: Take 100 mg by mouth at bedtime as needed for Sleep.      Facility-Administered Medications: None       Physical Exam  Temp:  [36.2 °C (97.1 °F)-36.4 °C (97.5 °F)] 36.2 °C (97.1 °F)  Pulse:  [57-67] 65  Resp:  [15-20] 16  BP: (100-139)/(56-67) 112/61  SpO2:  [90 %-100 %] 90 %    Physical Exam  Vitals signs reviewed.   Constitutional:       Appearance: Normal appearance. She is not diaphoretic.   HENT:      Head:  Normocephalic and atraumatic.      Nose: Nose normal.      Mouth/Throat:      Mouth: Mucous membranes are moist.      Pharynx: No oropharyngeal exudate.   Eyes:      General: No scleral icterus.        Right eye: No discharge.         Left eye: No discharge.      Extraocular Movements: Extraocular movements intact.      Conjunctiva/sclera: Conjunctivae normal.   Neck:      Musculoskeletal: Neck supple. No muscular tenderness.   Cardiovascular:      Rate and Rhythm: Normal rate and regular rhythm.      Pulses:           Radial pulses are 2+ on the right side and 2+ on the left side.        Dorsalis pedis pulses are 2+ on the right side and 2+ on the left side.      Heart sounds: No murmur.   Pulmonary:      Effort: Pulmonary effort is normal. No respiratory distress.      Breath sounds: Normal breath sounds. No wheezing or rales.   Abdominal:      General: Bowel sounds are normal. There is no distension.      Palpations: Abdomen is soft.   Musculoskeletal:         General: No swelling.      Right lower leg: No edema.      Left lower leg: No edema.   Lymphadenopathy:      Cervical: No cervical adenopathy.   Skin:     Coloration: Skin is not jaundiced or pale.   Neurological:      General: No focal deficit present.      Mental Status: She is alert and oriented to person, place, and time. Mental status is at baseline.      Cranial Nerves: No cranial nerve deficit.   Psychiatric:         Mood and Affect: Mood normal.         Behavior: Behavior normal.         Laboratory:  Recent Labs     08/17/20  1133   WBC 10.0   RBC 3.68*   HEMOGLOBIN 11.2*   HEMATOCRIT 34.8*   MCV 94.6   MCH 30.4   MCHC 32.2*   RDW 45.3   PLATELETCT 307   MPV 9.8     Recent Labs     08/17/20  1133   SODIUM 137   POTASSIUM 4.3   CHLORIDE 104   CO2 21   GLUCOSE 298*   BUN 27*   CREATININE 1.08   CALCIUM 9.4     Recent Labs     08/17/20  1133   ALTSGPT 41   ASTSGOT 26   ALKPHOSPHAT 109*   TBILIRUBIN 0.6   GLUCOSE 298*         No results for input(s):  NTPROBNP in the last 72 hours.      Recent Labs     08/17/20  1133   TROPONINT 30*       Imaging:  CT-CTA CHEST PULMONARY ARTERY W/ RECONS   Final Result      1.  No pulmonary embolus. No acute abnormality in the chest.   2.  Emphysema.   3.  Incidental 6.5 mm nodule in the anterior inferior aspect of the right upper lobe is stable since prior study. Follow-up guidelines for high and low risk patients are outlined below.      Pulmonary nodule guidelines:      Low Risk: CT at 6-12 months, then consider CT at 18-24 months      High Risk: CT at 6-12 months, then CT at 18-24 months      Low Risk - Minimal or absent history of smoking and of other known risk factors.      High Risk - History of smoking or of other known risk factors.      Note: These recommendations do not apply to lung cancer screening, patients with immunosuppression, or patients with known primary cancer.      Fleischner Society 2017 Guidelines for Management of Incidentally Detected Pulmonary Nodules in Adults               DX-CHEST-PORTABLE (1 VIEW)   Final Result      No acute cardiopulmonary disease.      EC-ECHOCARDIOGRAM COMPLETE W/O CONT    (Results Pending)         Assessment/Plan:  I anticipate this patient is appropriate for observation status at this time.    Lung nodule- (present on admission)  Assessment & Plan  Patient was aware of this.  I did discuss it with her.  She will need outpatient follow-up    Hypertension- (present on admission)  Assessment & Plan  Continue active treatment with amlodipine 10 mg, metoprolol 100 mg twice daily  Monitor vitals    Type 1 diabetes mellitus with neurological manifestations, uncontrolled (HCC)- (present on admission)  Assessment & Plan  Continue Lantus 20 units nightly  Monitor Accu-Cheks cover with sliding scale insulin  Glycohemoglobin 9.4 in past 5 months  Diabetic diet    Pain in the chest  Assessment & Plan  We will follow the patient on telemetry.  EKG PRN for chest pain  Serial  troponin  Aspirin, Brilinta, statin  Cardiology consulting    CAD S/P percutaneous coronary angioplasty- (present on admission)  Assessment & Plan  Aspirin, Brilinta, metoprolol,  PRN nitroglycerin and morphine for ongoing chest pain    Dyslipidemia- (present on admission)  Assessment & Plan  Statin therapy    Tobacco abuse- (present on admission)  Assessment & Plan  I have counseled the patient on smoking cessation for minutes spent on discussion and tips  Nicotine patch if request    Diabetic polyneuropathy ([facility])- (present on admission)  Assessment & Plan  Denied any acute issues at this point time for consideration of gabapentin if needed    Hypothyroidism, postsurgical- (present on admission)  Assessment & Plan  Continue active treatment with levothyroxine 150 mcg daily.  TSH 0.22 in past week  Check free T4

## 2020-08-17 NOTE — ED NOTES
Med Rec complete per Pt at bedside  Allergies Reviewed  No ABX in the last 14 days     Pt takes ASPRIN and BRILINTA    Pt out of TRESIBA (15 units, nightly)   and has been taking  LANTUS (20 units, nightly)

## 2020-08-17 NOTE — PROGRESS NOTES
Patient presents to the emergency department with chest pain, recent hospitalization and discharged on August 11, 2020.  During that hospitalization patient had undergone a cardiac cath with percutaneous coronary intervention to LAD, now returns with chest pain.  No concerns on EKG, troponin mildly elevated.Mildly elevated d-dimer, CTA PE was done and negative.  ERP discussed the case with cardiology on-call, Dr. Day who recommended hospitalist admit the patient overnight for observation.    ERP, Dr Marquez requesting admission for:  1.  Chest pain, recent percutaneous coronary intervention    I informed the ERP to obtain formal consultation from cardiology.  Hospital medicine will admit.    Case assigned to Dr. Joseph Carranza for admission who will evaluate the patient and place appropriate admission orders, appropriate admission status upon evaluating the patient in the emergency department.    Erasmo Agrawal M.D.  08/17/20  2:59 PM

## 2020-08-17 NOTE — ED TRIAGE NOTES
Chief Complaint   Patient presents with   • Chest Pain     sudden onset last night while sleeping, pain from R side, radiatin gto L side , neck and L arm   • Shortness of Breath     speaking in full sentences     Patient ambulatory to triage with steady gait, ambulates with personal 4 point cane, A&O x4.    EKG completed, protocol ordered.  Mask in place, denies respiratory symptoms or exposure to Covid-19 + persons.    Explained wait time and triage process to pt. Pt placed back in lobby, told to notify ED tech or triage RN of any changes, verbalized understanding.

## 2020-08-17 NOTE — CONSULTS
Reason for Consult:  Asked by Dr Marquez to see this patient with  Chest pain  Patient's PCP: Jarrell Yates M.D.    CC:   Chief Complaint   Patient presents with   • Chest Pain     sudden onset last night while sleeping, pain from R side, radiatin gto L side , neck and L arm   • Shortness of Breath     speaking in full sentences       HPI: 65-year-old female patient with history of diabetes mellitus type 1, hypothyroidism hypertension recently admitted with chest pain, she underwent coronary angiogram, left anterior descending artery PCI no complications.  She presented today with recurrent chest pain.  Chest pain woke her up from sleep, sharp stabbing pain like somebody running knife through her chest severe in intensity radiating to her shoulder, worsened with deep breathing.  She has continuous pain since last night.  Reports taking medications regularly    Medications / Drug list prior to admission:  No current facility-administered medications on file prior to encounter.      Current Outpatient Medications on File Prior to Encounter   Medication Sig Dispense Refill   • insulin glargine (LANTUS) 100 UNIT/ML Solution Inject 20 Units as instructed every evening.     • atorvastatin (LIPITOR) 40 MG Tab Take 1 Tab by mouth every evening. 90 Tab 3   • amLODIPine (NORVASC) 10 MG Tab Take 1 Tab by mouth every evening. 30 Tab 11   • aspirin EC 81 MG EC tablet Take 81 mg by mouth every morning. 30 Tab    • metoprolol (LOPRESSOR) 100 MG Tab Take 1 Tab by mouth 2 Times a Day. 60 Tab 3   • ticagrelor (BRILINTA) 90 MG Tab tablet Take 1 Tab by mouth 2 Times a Day. 60 Tab 1   • Insulin Degludec (TRESIBA) 100 UNIT/ML Solution Inject 15 Units as instructed every evening. 1 mL 0   • levothyroxine (SYNTHROID) 150 MCG Tab Take 1 Tab by mouth Every morning on an empty stomach. 30 Tab 2   • sucralfate (CARAFATE) 1 GM Tab Take 1 g by mouth 4 Times a Day,Before Meals and at Bedtime.     • traZODone (DESYREL) 100 MG Tab Take 100 mg by  "mouth at bedtime as needed for Sleep.     • insulin aspart (NOVOLOG) 100 UNIT/ML Solution Inject 2-10 Units as instructed 3 times a day before meals. 1 units for every 5 g of carbs   AND  Sliding Scale   150 - 200 = 1 units  201 - 250 = 2 units  251 - 300 = 3 units  301 - 350 = 4 units  351 - 400 = 5 units         Current list of administered Medications:  No current facility-administered medications for this encounter.     Current Outpatient Medications:   •  insulin glargine (LANTUS) 100 UNIT/ML Solution, Inject 20 Units as instructed every evening., Disp: , Rfl:   •  atorvastatin (LIPITOR) 40 MG Tab, Take 1 Tab by mouth every evening., Disp: 90 Tab, Rfl: 3  •  amLODIPine (NORVASC) 10 MG Tab, Take 1 Tab by mouth every evening., Disp: 30 Tab, Rfl: 11  •  aspirin EC 81 MG EC tablet, Take 81 mg by mouth every morning., Disp: 30 Tab, Rfl:   •  metoprolol (LOPRESSOR) 100 MG Tab, Take 1 Tab by mouth 2 Times a Day., Disp: 60 Tab, Rfl: 3  •  ticagrelor (BRILINTA) 90 MG Tab tablet, Take 1 Tab by mouth 2 Times a Day., Disp: 60 Tab, Rfl: 1  •  Insulin Degludec (TRESIBA) 100 UNIT/ML Solution, Inject 15 Units as instructed every evening., Disp: 1 mL, Rfl: 0  •  levothyroxine (SYNTHROID) 150 MCG Tab, Take 1 Tab by mouth Every morning on an empty stomach., Disp: 30 Tab, Rfl: 2  •  sucralfate (CARAFATE) 1 GM Tab, Take 1 g by mouth 4 Times a Day,Before Meals and at Bedtime., Disp: , Rfl:   •  traZODone (DESYREL) 100 MG Tab, Take 100 mg by mouth at bedtime as needed for Sleep., Disp: , Rfl:   •  insulin aspart (NOVOLOG) 100 UNIT/ML Solution, Inject 2-10 Units as instructed 3 times a day before meals. 1 units for every 5 g of carbs  AND Sliding Scale  150 - 200 = 1 units 201 - 250 = 2 units 251 - 300 = 3 units 301 - 350 = 4 units 351 - 400 = 5 units, Disp: , Rfl:     Past Medical History:   Diagnosis Date   • Adverse effect of anesthesia     in 2008 \"throat closes up\"\"anxiety\" ?laryngospasm, kept in ICU. Pt states no problems " "currently 2018.    • Anesthesia     in 2008 \"throat closes up\"\"anxiety\" ?laryngospasm, kept in ICU. Pt states no problems currently 2018.    • Arthritis     right shoulder, hands   • Cigarette smoker quit 2013   • Dental disorder     missing teeth    • depression 8/30/2016    denies depression, states has anxiety and panic attacks   • Diabetes mellitus type 1 (HCC) 1989    insulin   • Encounter for long-term (current) use of insulin (HCC) 9/25/2013   • Heart burn    • High cholesterol    • Hypertension    • Hypothyroidism, postsurgical 1970   • Indigestion    • Infectious disease      had hepatitis C, tested neg.   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.63) 6/10/2015       Past Surgical History:   Procedure Laterality Date   • CATH PLACEMENT  1/25/2020    Procedure: INSERTION, CATHETER PERM;  Surgeon: Rola Mendoza M.D.;  Location: [facility];  Service: General   • IRRIGATION & DEBRIDEMENT NEURO  1/19/2020    Procedure: IRRIGATION AND DEBRIDEMENT, WOUND THORACIC AND LUMBAR;  Surgeon: Ryan Roman M.D.;  Location: [facility];  Service: Neurosurgery   • PB IMPLANT NEUROSTIM/ N/A 12/16/2019    Procedure: EXPLORATION AT THORACIC 8 - 9, REPLACEMENT OF  NEUROSTIMULATOR LEAD;  Surgeon: Ryan Roman M.D.;  Location: [facility];  Service: Neurosurgery   • SPINAL CORD STIMULATOR N/A 10/26/2018    Procedure: SPINAL CORD STIMULATOR;  Surgeon: Rayn Roman M.D.;  Location: [facility];  Service: Neurosurgery   • THORACIC LAMINECTOMY N/A 10/26/2018    Procedure: THORACIC LAMINECTOMY - FOR;  Surgeon: Ryan Roman M.D.;  Location: [facility];  Service: Neurosurgery   • PB INJ,FORAMEN,L/S,1 LEVEL Right 8/31/2016    " Procedure: INJ-FORAMEN EPI LUM/SAC SNGL L4-5;  Surgeon: Sukhi Godfrey M.D.;  Location: SURGERY [facility];  Service: Pain Management   • LUMBAR LAMINECTOMY DISKECTOMY Right 5/10/2016    Procedure: LUMBAR L4-5 HEMILAMINECTOMY DISKECTOMY ;  Surgeon: Arnold Keyes M.D.;  Location: SURGERY [facility];  Service:    • CERVICAL FUSION POSTERIOR  1/16/2009    Performed by TARA CONTRERAS at SURGERY [facility]S   • HARDWARE REMOVAL NEURO  1/16/2009    Performed by TARA CONTRERAS at SURGERY [facility]   • NECK EXPLORATION  1/16/2009    Performed by TARA CONTRERAS at [facility]   • SHOULDER ARTHROSCOPY W/ ROTATOR CUFF REPAIR  10/9/08    Performed by SHERLY CASTANEDA at [facility]   • SHOULDER DECOMPRESSION ARTHROSCOPIC  6/17/08    Performed by SHERLY CASTANEDA at [facility]   • CLAVICLE DISTAL EXCISION  6/17/08    Performed by SHERLY CASTANEDA at [facility]   • CERVICAL DISK AND FUSION ANTERIOR  03/12/08   • HYSTERECTOMY, VAGINAL  2006   • APPENDECTOMY  2004   • THYROID LOBECTOMY  1973   • TONSILLECTOMY  1963   • ABDOMINAL HYSTERECTOMY TOTAL     • LUMPECTOMY  1976, 2005    Breast    • LUMPECTOMY         Family History   Problem Relation Age of Onset   • Hypertension Mother    • Cancer Father        Social History     Socioeconomic History   • Marital status:      Spouse name: Not on file   • Number of children: Not on file   • Years of education: Not on file   • Highest education level: Not on file   Occupational History   • Not on file   Social Needs   • Financial resource strain: Not hard at all   • Food insecurity     Worry: Never true     Inability: Never true   • Transportation needs     Medical: Yes     Non-medical: Yes   Tobacco Use   • Smoking status: Current Every Day Smoker     Packs/day: 1.00     Years: 30.00     Pack years: 30.00     Types: Cigarettes   • Smokeless tobacco: Never Used   • Tobacco comment: 1 ppd    Substance and Sexual  "Activity   • Alcohol use: No     Comment:     • Drug use: No   • Sexual activity: Not on file   Lifestyle   • Physical activity     Days per week: 0 days     Minutes per session: 0 min   • Stress: Not at all   Relationships   • Social connections     Talks on phone: More than three times a week     Gets together: More than three times a week     Attends [employer] service: Never     Active member of club or organization: No     Attends meetings of clubs or organizations: Never     Relationship status:    • Intimate partner violence     Fear of current or ex partner: No     Emotionally abused: No     Physically abused: No     Forced sexual activity: No   Other Topics Concern   • Not on file   Social History Narrative   • Not on file       ALLERGIES:  Allergies   Allergen Reactions   • Ativan Unspecified      Extreme Restlessness with whole body  [id number]   • Tape      Blisters, paper tape is ok       Review of systems:  A detailed review of symptoms was reviewed with patient. This is reviewed in H&P and PMH. ALL OTHERS reviewed and negative    Physical exam:  Patient Vitals for the past 24 hrs:   BP Temp Temp src Pulse Resp SpO2 Height Weight   08/17/20 1301 125/60 -- -- 61 16 95 % -- --   08/17/20 1201 116/56 -- -- (!) 57 20 96 % -- --   08/17/20 1121 113/61 -- -- (!) 59 17 100 % -- --   08/17/20 1013 -- -- -- -- -- -- -- 70.5 kg (155 lb 6.8 oz)   08/17/20 1003 100/65 36.4 °C (97.5 °F) Temporal 67 16 97 % 1.676 m (5' 6\") --     General: No acute distress.   EYES: no jaundice  HEENT: OP clear   Neck: No bruits No JVD.   CVS:   RRR. S1 + S2. No M/R/G  Resp: CTAB. No wheezing or crackles/rhonchi.  Abdomen: Soft, NT, ND,  Skin: Grossly nothing acute no obvious rashes  Neurological: Alert, Moves all extremities, no cranial nerve defects on limited exam  Extremities: Pulse 2+ in b/l LE.  no edema. No cyanosis.       Data:  Laboratory studies:  Recent Results (from the past 24 hour(s))   EKG (NOW)    Collection Time: " 20 10:08 AM   Result Value Ref Range    Report       [facility] Emergency Dept.    Test Date:  2020  Pt Name:    KALPANA BUTTS               Department: ER  MRN:        [id number]                      Room:  Gender:     Female                       Technician: [id number]  :        [id number]                   Requested By:ER TRIAGE PROTOCOL  Order #:    [id number]                    Reading MD:    Measurements  Intervals                                Axis  Rate:       64                           P:          69  WA:         140                          QRS:        43  QRSD:       90                           T:          54  QT:         384  QTc:        396    Interpretive Statements  SINUS RHYTHM  BASELINE WANDER IN LEAD(S) V6  Compared to ECG 2020 03:13:13  No significant changes     CBC with Differential    Collection Time: 20 11:33 AM   Result Value Ref Range    WBC 10.0 4.8 - 10.8 K/uL    RBC 3.68 (L) 4.20 - 5.40 M/uL    Hemoglobin 11.2 (L) 12.0 - 16.0 g/dL    Hematocrit 34.8 (L) 37.0 - 47.0 %    MCV 94.6 81.4 - 97.8 fL    MCH 30.4 27.0 - 33.0 pg    MCHC 32.2 (L) 33.6 - 35.0 g/dL    RDW 45.3 35.9 - 50.0 fL    Platelet Count 307 164 - 446 K/uL    MPV 9.8 9.0 - 12.9 fL    Neutrophils-Polys 81.50 (H) 44.00 - 72.00 %    Lymphocytes 7.40 (L) 22.00 - 41.00 %    Monocytes 8.60 0.00 - 13.40 %    Eosinophils 1.50 0.00 - 6.90 %    Basophils 0.70 0.00 - 1.80 %    Immature Granulocytes 0.30 0.00 - 0.90 %    Nucleated RBC 0.00 /100 WBC    Neutrophils (Absolute) 8.13 (H) 2.00 - 7.15 K/uL    Lymphs (Absolute) 0.74 (L) 1.00 - 4.80 K/uL    Monos (Absolute) 0.86 (H) 0.00 - 0.85 K/uL    Eos (Absolute) 0.15 0.00 - 0.51 K/uL    Baso (Absolute) 0.07 0.00 - 0.12 K/uL    Immature Granulocytes (abs) 0.03 0.00 - 0.11 K/uL    NRBC (Absolute) 0.00 K/uL   Complete Metabolic Panel (CMP)    Collection Time: 20 11:33 AM   Result Value Ref Range    Sodium 137 135 - 145 mmol/L    Potassium 4.3 3.6 -  5.5 mmol/L    Chloride 104 96 - 112 mmol/L    Co2 21 20 - 33 mmol/L    Anion Gap 12.0 7.0 - 16.0    Glucose 298 (H) 65 - 99 mg/dL    Bun 27 (H) 8 - 22 mg/dL    Creatinine 1.08 0.50 - 1.40 mg/dL    Calcium 9.4 8.5 - 10.5 mg/dL    AST(SGOT) 26 12 - 45 U/L    ALT(SGPT) 41 2 - 50 U/L    Alkaline Phosphatase 109 (H) 30 - 99 U/L    Total Bilirubin 0.6 0.1 - 1.5 mg/dL    Albumin 4.0 3.2 - 4.9 g/dL    Total Protein 6.7 6.0 - 8.2 g/dL    Globulin 2.7 1.9 - 3.5 g/dL    A-G Ratio 1.5 g/dL   Troponin    Collection Time: 08/17/20 11:33 AM   Result Value Ref Range    Troponin T 30 (H) 6 - 19 ng/L   D-DIMER    Collection Time: 08/17/20 11:33 AM   Result Value Ref Range    D-Dimer Screen 0.58 (H) 0.00 - 0.50 ug/mL (FEU)   ESTIMATED GFR    Collection Time: 08/17/20 11:33 AM   Result Value Ref Range    GFR If African American >60 >60 mL/min/1.73 m 2    GFR If Non  51 (A) >60 mL/min/1.73 m 2       Imaging:  DX-CHEST-PORTABLE (1 VIEW)   Final Result      No acute cardiopulmonary disease.      CT-CTA CHEST PULMONARY ARTERY W/ RECONS    (Results Pending)     Cath 8/10/2020  Coronary Anatomy              Left Main: Normal              LAD: proximal 70% and mid 80% stenosis              LCx: Minimal luminal irregularities              RCA: Dominant, high anterior takeoff with diffuse luminal irregularities, no more than 40% stenosis in the mid segment  PCI of LAD with 3.0 by 18 mm drug-eluting stent, 3.0X26 drug-eluting stent    EKG : personally reviewed by me EKG sinus rhythm, no significant ST changes    All pertinent features of laboratory and imaging reviewed including primary images where applicable    Assessment / Plan:  65-year-old female patient with history of unstable angina status post LAD stent on 8/10/2020, type 1 diabetes mellitus, hypertension presented with pleuritic chest pain.    EKG is not consistent with in-stent stenosis or thrombosis.  Her pain is clearly pleuritic.  She is getting a CTA chest to  further evaluate.  Recommend starting home medications aspirin, Brilinta, statin.  Trend troponins, obtain echocardiogram.    It is my pleasure to participate in the care of Ms. Manuel.  Please do not hesitate to contact me with questions or concerns.    Hussein Day M.D.    8/17/2020

## 2020-08-18 ENCOUNTER — APPOINTMENT (OUTPATIENT)
Dept: CARDIOLOGY | Facility: MEDICAL CENTER | Age: 65
End: 2020-08-18
Attending: INTERNAL MEDICINE
Payer: MEDICARE

## 2020-08-18 VITALS
HEIGHT: 66 IN | SYSTOLIC BLOOD PRESSURE: 121 MMHG | WEIGHT: 134.7 LBS | BODY MASS INDEX: 21.65 KG/M2 | DIASTOLIC BLOOD PRESSURE: 72 MMHG | OXYGEN SATURATION: 97 % | HEART RATE: 71 BPM | RESPIRATION RATE: 18 BRPM | TEMPERATURE: 97.6 F

## 2020-08-18 LAB
ANION GAP SERPL CALC-SCNC: 12 MMOL/L (ref 7–16)
BASOPHILS # BLD AUTO: 1 % (ref 0–1.8)
BASOPHILS # BLD: 0.07 K/UL (ref 0–0.12)
BUN SERPL-MCNC: 20 MG/DL (ref 8–22)
CALCIUM SERPL-MCNC: 9 MG/DL (ref 8.5–10.5)
CHLORIDE SERPL-SCNC: 106 MMOL/L (ref 96–112)
CO2 SERPL-SCNC: 21 MMOL/L (ref 20–33)
COVID ORDER STATUS COVID19: NORMAL
CREAT SERPL-MCNC: 0.92 MG/DL (ref 0.5–1.4)
EOSINOPHIL # BLD AUTO: 0.28 K/UL (ref 0–0.51)
EOSINOPHIL NFR BLD: 3.8 % (ref 0–6.9)
ERYTHROCYTE [DISTWIDTH] IN BLOOD BY AUTOMATED COUNT: 45.2 FL (ref 35.9–50)
GLUCOSE BLD-MCNC: 269 MG/DL (ref 65–99)
GLUCOSE BLD-MCNC: 37 MG/DL (ref 65–99)
GLUCOSE BLD-MCNC: 68 MG/DL (ref 65–99)
GLUCOSE SERPL-MCNC: 53 MG/DL (ref 65–99)
HCT VFR BLD AUTO: 33.8 % (ref 37–47)
HGB BLD-MCNC: 10.9 G/DL (ref 12–16)
IMM GRANULOCYTES # BLD AUTO: 0.03 K/UL (ref 0–0.11)
IMM GRANULOCYTES NFR BLD AUTO: 0.4 % (ref 0–0.9)
LV EJECT FRACT MOD 4C 99902: 53.07
LYMPHOCYTES # BLD AUTO: 1.42 K/UL (ref 1–4.8)
LYMPHOCYTES NFR BLD: 19.5 % (ref 22–41)
MCH RBC QN AUTO: 30.4 PG (ref 27–33)
MCHC RBC AUTO-ENTMCNC: 32.2 G/DL (ref 33.6–35)
MCV RBC AUTO: 94.2 FL (ref 81.4–97.8)
MONOCYTES # BLD AUTO: 0.9 K/UL (ref 0–0.85)
MONOCYTES NFR BLD AUTO: 12.3 % (ref 0–13.4)
NEUTROPHILS # BLD AUTO: 4.59 K/UL (ref 2–7.15)
NEUTROPHILS NFR BLD: 63 % (ref 44–72)
NRBC # BLD AUTO: 0 K/UL
NRBC BLD-RTO: 0 /100 WBC
PLATELET # BLD AUTO: 280 K/UL (ref 164–446)
PMV BLD AUTO: 9.8 FL (ref 9–12.9)
POTASSIUM SERPL-SCNC: 3.9 MMOL/L (ref 3.6–5.5)
RBC # BLD AUTO: 3.59 M/UL (ref 4.2–5.4)
SARS-COV-2 RNA RESP QL NAA+PROBE: NOTDETECTED
SODIUM SERPL-SCNC: 139 MMOL/L (ref 135–145)
SPECIMEN SOURCE: NORMAL
TROPONIN T SERPL-MCNC: 26 NG/L (ref 6–19)
WBC # BLD AUTO: 7.3 K/UL (ref 4.8–10.8)

## 2020-08-18 PROCEDURE — 93308 TTE F-UP OR LMTD: CPT | Mod: 26 | Performed by: INTERNAL MEDICINE

## 2020-08-18 PROCEDURE — U0003 INFECTIOUS AGENT DETECTION BY NUCLEIC ACID (DNA OR RNA); SEVERE ACUTE RESPIRATORY SYNDROME CORONAVIRUS 2 (SARS-COV-2) (CORONAVIRUS DISEASE [COVID-19]), AMPLIFIED PROBE TECHNIQUE, MAKING USE OF HIGH THROUGHPUT TECHNOLOGIES AS DESCRIBED BY CMS-2020-01-R: HCPCS

## 2020-08-18 PROCEDURE — 700102 HCHG RX REV CODE 250 W/ 637 OVERRIDE(OP): Performed by: HOSPITALIST

## 2020-08-18 PROCEDURE — C9803 HOPD COVID-19 SPEC COLLECT: HCPCS | Performed by: HOSPITALIST

## 2020-08-18 PROCEDURE — 99213 OFFICE O/P EST LOW 20 MIN: CPT | Mod: 25 | Performed by: INTERNAL MEDICINE

## 2020-08-18 PROCEDURE — 99217 PR OBSERVATION CARE DISCHARGE: CPT | Performed by: FAMILY MEDICINE

## 2020-08-18 PROCEDURE — 85025 COMPLETE CBC W/AUTO DIFF WBC: CPT

## 2020-08-18 PROCEDURE — 96372 THER/PROPH/DIAG INJ SC/IM: CPT

## 2020-08-18 PROCEDURE — 80048 BASIC METABOLIC PNL TOTAL CA: CPT

## 2020-08-18 PROCEDURE — 700111 HCHG RX REV CODE 636 W/ 250 OVERRIDE (IP): Performed by: HOSPITALIST

## 2020-08-18 PROCEDURE — 84484 ASSAY OF TROPONIN QUANT: CPT

## 2020-08-18 PROCEDURE — 93308 TTE F-UP OR LMTD: CPT

## 2020-08-18 PROCEDURE — A9270 NON-COVERED ITEM OR SERVICE: HCPCS | Performed by: HOSPITALIST

## 2020-08-18 PROCEDURE — 36415 COLL VENOUS BLD VENIPUNCTURE: CPT

## 2020-08-18 PROCEDURE — G0378 HOSPITAL OBSERVATION PER HR: HCPCS

## 2020-08-18 PROCEDURE — 82962 GLUCOSE BLOOD TEST: CPT | Mod: 91

## 2020-08-18 RX ORDER — COLCHICINE 0.6 MG/1
0.6 TABLET ORAL
Status: DISCONTINUED | OUTPATIENT
Start: 2020-08-18 | End: 2020-08-18 | Stop reason: HOSPADM

## 2020-08-18 RX ORDER — COLCHICINE 0.6 MG/1
0.6 TABLET ORAL 2 TIMES DAILY PRN
Qty: 20 TAB | Refills: 0 | Status: SHIPPED | OUTPATIENT
Start: 2020-08-18 | End: 2020-09-10

## 2020-08-18 RX ADMIN — INSULIN HUMAN 5 UNITS: 100 INJECTION, SOLUTION PARENTERAL at 13:04

## 2020-08-18 RX ADMIN — TICAGRELOR 90 MG: 90 TABLET ORAL at 05:27

## 2020-08-18 RX ADMIN — ASPIRIN 325 MG: 325 TABLET, FILM COATED ORAL at 05:27

## 2020-08-18 RX ADMIN — ENOXAPARIN SODIUM 40 MG: 40 INJECTION SUBCUTANEOUS at 05:30

## 2020-08-18 RX ADMIN — NITROGLYCERIN 0.5 INCH: 20 OINTMENT TOPICAL at 05:27

## 2020-08-18 RX ADMIN — LEVOTHYROXINE SODIUM 150 MCG: 0.15 TABLET ORAL at 05:27

## 2020-08-18 ASSESSMENT — ENCOUNTER SYMPTOMS
ABDOMINAL PAIN: 0
SHORTNESS OF BREATH: 0
PALPITATIONS: 0
CHEST TIGHTNESS: 0
BLOOD IN STOOL: 0
DIZZINESS: 0
FEVER: 0

## 2020-08-18 NOTE — ASSESSMENT & PLAN NOTE
Continue Lantus 20 units nightly  Monitor Accu-Cheks cover with sliding scale insulin  Glycohemoglobin 9.4 in past 5 months  Diabetic diet

## 2020-08-18 NOTE — PROGRESS NOTES
Cardiology Follow Up Progress Note    Date of Service  8/18/2020    Attending Physician  Vladimir Almanzar M.D.    Chief Complaint   Chest pain.    HPI  Anu Manuel is a 65 y.o. female admitted 8/17/2020 with chest pain that was felt to be pleuritis in nature. Patient had recently underwent LHC on 8/10/20 due to chest pain which showed single vessel mid LAD disease, she underwent successful PCI with MINDY to the proximal to the mid LAD. EKG this admission did not show changes consistent with in-stent stenosis or thrombosis. It was recommended to trend her troponin and obtain a chest CTA and echocardiogram.     Past medical history significant for type 1 diabetes, hypothyroidism and hypertension.    Interim Events  Currently chest pain free.  Troponin remain relatively flat (30-->28).   Renal function remains stable.   TTE ordered to be completed today before discharge.   No acute events overnight, feeling well this am and anxious to go home.      Review of Systems  Review of Systems   Constitutional: Negative for fever.   Respiratory: Negative for chest tightness and shortness of breath.    Cardiovascular: Negative for chest pain, palpitations and leg swelling.   Gastrointestinal: Negative for abdominal pain and blood in stool.   Genitourinary: Negative for hematuria.   Musculoskeletal: Negative for gait problem.   Neurological: Negative for dizziness and syncope.   All other systems reviewed and are negative.      Vital signs in last 24 hours  Temp:  [36.1 °C (96.9 °F)-36.4 °C (97.5 °F)] 36.1 °C (97 °F)  Pulse:  [57-67] 64  Resp:  [15-20] 16  BP: ()/(53-67) 100/53  SpO2:  [90 %-100 %] 92 %    Physical Exam  Physical Exam  Constitutional:       General: She is not in acute distress.     Appearance: Normal appearance.   HENT:      Head: Normocephalic.   Neck:      Musculoskeletal: Normal range of motion.   Cardiovascular:      Rate and Rhythm: Normal rate and regular rhythm.      Pulses: Normal  pulses.      Heart sounds: Normal heart sounds.   Pulmonary:      Effort: Pulmonary effort is normal.      Breath sounds: Normal breath sounds.   Abdominal:      Palpations: Abdomen is soft.      Tenderness: There is no abdominal tenderness.   Musculoskeletal:      Right lower leg: No edema.      Left lower leg: No edema.   Skin:     General: Skin is warm and dry.   Neurological:      Mental Status: She is alert and oriented to person, place, and time.   Psychiatric:         Mood and Affect: Mood normal.         Behavior: Behavior normal.         Thought Content: Thought content normal.         Lab Review  Lab Results   Component Value Date/Time    WBC 7.3 08/18/2020 03:57 AM    RBC 3.59 (L) 08/18/2020 03:57 AM    HEMOGLOBIN 10.9 (L) 08/18/2020 03:57 AM    HEMATOCRIT 33.8 (L) 08/18/2020 03:57 AM    MCV 94.2 08/18/2020 03:57 AM    MCH 30.4 08/18/2020 03:57 AM    MCHC 32.2 (L) 08/18/2020 03:57 AM    MPV 9.8 08/18/2020 03:57 AM      Lab Results   Component Value Date/Time    SODIUM 139 08/18/2020 03:57 AM    POTASSIUM 3.9 08/18/2020 03:57 AM    CHLORIDE 106 08/18/2020 03:57 AM    CO2 21 08/18/2020 03:57 AM    GLUCOSE 53 (L) 08/18/2020 03:57 AM    BUN 20 08/18/2020 03:57 AM    CREATININE 0.92 08/18/2020 03:57 AM    CREATININE 1.0 01/08/2009 04:31 PM      Lab Results   Component Value Date/Time    ASTSGOT 26 08/17/2020 11:33 AM    ALTSGPT 41 08/17/2020 11:33 AM     Lab Results   Component Value Date/Time    CHOLSTRLTOT 96 (L) 08/10/2020 05:03 AM    LDL 37 08/10/2020 05:03 AM    HDL 43 08/10/2020 05:03 AM    TRIGLYCERIDE 80 08/10/2020 05:03 AM    TROPONINT 28 (H) 08/17/2020 06:45 PM       No results for input(s): NTPROBNP in the last 72 hours.    Cardiac Imaging and Procedures Review  Cardiac Catheterization 8/10/2020:     Coronary Anatomy  Left Main: Normal  LAD: proximal 70% and mid 80% stenosis  LCx: Minimal luminal irregularities  RCA: Dominant, high anterior takeoff with diffuse luminal irregularities, no more than  40% stenosis in the mid segment     IMPRESSIONS:  1. Unstable angina due to obstructive single vessel CAD  2. Successful PCI of the proximal through mid LAD using iFR guidance  3. Normal LVEDP     Recommendations:  DAPT  Usual post PCI care     Pre-procedure diagnosis: Unstable angina  Post-procedure diagnosis: Same    Echocardiogram 8/11/2020:  CONCLUSIONS  Limited study.  Normal left ventricular chamber size.  Left ventricular ejection fraction is visually estimated to be 60%.  Normal regional wall motion.    CT-CTA Chest 8/17/2020:  IMPRESSION:     1.  No pulmonary embolus. No acute abnormality in the chest.  2.  Emphysema.  3.  Incidental 6.5 mm nodule in the anterior inferior aspect of the right upper lobe is stable since prior study. Follow-up guidelines for high and low risk patients are outlined below.     Assessment/Plan  CAD:  -S/P PCI with MINDY to the LAD on 8/10/2020.  -TTE on 8/11/2020 showed preserved LVEF with no WMA.   -LDL at goal (37).  -ASA reduced to 81 mg daily from 325 mg daily.   -Continue Brilinta 90 mg BID, Atorvastatin 40 mg daily and Lopressor 100 mg BID.     Chest Pain:  -Now stable.  -CT negative for PE or acute abnormality.   -Trop remains unchanged.   -Repeat TTE pending.   -Start Colchicine 0.6 mg daily prn.      Thank you for allowing me to participate in the care of this patient. Close cardiology follow up arranged as below. Please let us know if you have any questions or concerns.     Future Appointments   Date Time Provider Department Center   8/24/2020 11:30 AM [facility] None   9/10/2020  3:00 PM WILLIAM Everett. [facility] None       WILLIAM Everett.   [facility]  [phone]

## 2020-08-18 NOTE — DISCHARGE INSTRUCTIONS
Discharge Instructions    Discharged to home by car with relative. Discharged via wheelchair, hospital escort: Yes.  Special equipment needed: Not Applicable    Be sure to schedule a follow-up appointment with your primary care doctor or any specialists as instructed.     Discharge Plan:   Diet Plan: Discussed  Activity Level: Discussed  Smoking Cessation Offered: Patient Refused  Confirmed Follow up Appointment: Appointment Scheduled  Confirmed Symptoms Management: Discussed  Medication Reconciliation Updated: Yes    I understand that a diet low in cholesterol, fat, and sodium is recommended for good health. Unless I have been given specific instructions below for another diet, I accept this instruction as my diet prescription.   Other diet: Cardiac    Special Instructions: None    · Is patient discharged on Warfarin / Coumadin?   No     Depression / Suicide Risk    As you are discharged from this [facility] facility, it is important to learn how to keep safe from harming yourself.    Recognize the warning signs:  · Abrupt changes in personality, positive or negative- including increase in energy   · Giving away possessions  · Change in eating patterns- significant weight changes-  positive or negative  · Change in sleeping patterns- unable to sleep or sleeping all the time   · Unwillingness or inability to communicate  · Depression  · Unusual sadness, discouragement and loneliness  · Talk of wanting to die  · Neglect of personal appearance   · Rebelliousness- reckless behavior  · Withdrawal from people/activities they love  · Confusion- inability to concentrate     If you or a loved one observes any of these behaviors or has concerns about self-harm, here's what you can do:  · Talk about it- your feelings and reasons for harming yourself  · Remove any means that you might use to hurt yourself (examples: pills, rope, extension cords, firearm)  · Get professional help from the community (Mental Health, Substance  Abuse, psychological counseling)  · Do not be alone:Call your Safe Contact- someone whom you trust who will be there for you.  · Call your local CRISIS HOTLINE 216-2569 or 044-044-9363  · Call your local Children's Mobile Crisis Response Team [place] (911) 506-4154 or www.[employer]  · Call the toll free National Suicide Prevention Hotlines   · National Suicide Prevention Lifeline 025-398-FFGT (9346)  · National Hope Line Network 800-SUICIDE (445-7385)

## 2020-08-18 NOTE — ASSESSMENT & PLAN NOTE
I have counseled the patient on smoking cessation for minutes spent on discussion and tips  Nicotine patch if request

## 2020-08-18 NOTE — PROGRESS NOTES
Report received. Patient oriented x4. Chest pain diminished. . Denies SOB. Fall risk interventions in place, bed in low position, pt upself. Assessment completed.

## 2020-08-18 NOTE — PROGRESS NOTES
Pt ready for discharge. IV removed. AVS reviewed with pt and all questions answered. Pt has all belongings. Pt leaving with  to go home. Pt escorted off unit with this RN.

## 2020-08-18 NOTE — PROGRESS NOTES
Pt's blood sugar 37 this morning before breakfast. Pt type 1 diabetic and states this is happens sometimes. Pt denies symptoms and is alert and oriented. Pt given juice and crackers and does not want any glucose tabs.   Pt's breakfast available at 0835. Rechecked blood sugar and it is up to 68. Pt does not want any additional food or glucose at this time and will finish her breakfast. Will recheck and let primary team know.

## 2020-08-18 NOTE — CARE PLAN
Problem: Communication  Goal: The ability to communicate needs accurately and effectively will improve  Intervention: Educate patient and significant other/support system about the plan of care, procedures, treatments, medications and allow for questions  Flowsheets (Taken 8/17/2020 2208)  Pt & Family Have Been Educated on Methods Available to Report Concerns Related to Care, Treatment, Services, and Patient Safety Issues: Yes     Problem: Venous Thromboembolism (VTW)/Deep Vein Thrombosis (DVT) Prevention:  Goal: Patient will participate in Venous Thrombosis (VTE)/Deep Vein Thrombosis (DVT)Prevention Measures  Flowsheets (Taken 8/17/2020 2208)  Pharmacologic Prophylaxis Used: LMWH: Enoxaparin(Lovenox)

## 2020-08-18 NOTE — PROGRESS NOTES
2 RN skin check complete.   Devices in place N/A.  Skin assessed under devices clean dry intact.  Confirmed pressure ulcers found on N/A.  New potential pressure ulcers noted on N/A. Wound consult placed N/A.  The following interventions in place : pt turns self and ambulates     Sacrum intact   Bruising scattered

## 2020-08-18 NOTE — ASSESSMENT & PLAN NOTE
We will follow the patient on telemetry.  EKG PRN for chest pain  Serial troponin  Aspirin, Brilinta, statin  Cardiology consulting

## 2020-08-19 ENCOUNTER — HOSPITAL ENCOUNTER (OUTPATIENT)
Dept: LAB | Facility: MEDICAL CENTER | Age: 65
End: 2020-08-19
Attending: NURSE PRACTITIONER
Payer: MEDICARE

## 2020-08-19 LAB
ALBUMIN SERPL BCP-MCNC: 3.8 G/DL (ref 3.2–4.9)
ALBUMIN/GLOB SERPL: 1.4 G/DL
ALP SERPL-CCNC: 117 U/L (ref 30–99)
ALT SERPL-CCNC: 39 U/L (ref 2–50)
ANION GAP SERPL CALC-SCNC: 12 MMOL/L (ref 7–16)
APPEARANCE UR: CLEAR
AST SERPL-CCNC: 28 U/L (ref 12–45)
BACTERIA #/AREA URNS HPF: NEGATIVE /HPF
BILIRUB SERPL-MCNC: 0.3 MG/DL (ref 0.1–1.5)
BILIRUB UR QL STRIP.AUTO: NEGATIVE
BUN SERPL-MCNC: 16 MG/DL (ref 8–22)
CALCIUM SERPL-MCNC: 9.2 MG/DL (ref 8.5–10.5)
CHLORIDE SERPL-SCNC: 102 MMOL/L (ref 96–112)
CO2 SERPL-SCNC: 22 MMOL/L (ref 20–33)
COLOR UR: YELLOW
CREAT SERPL-MCNC: 0.97 MG/DL (ref 0.5–1.4)
EPI CELLS #/AREA URNS HPF: ABNORMAL /HPF
EST. AVERAGE GLUCOSE BLD GHB EST-MCNC: 217 MG/DL
FASTING STATUS PATIENT QL REPORTED: NORMAL
GLOBULIN SER CALC-MCNC: 2.7 G/DL (ref 1.9–3.5)
GLUCOSE SERPL-MCNC: 219 MG/DL (ref 65–99)
GLUCOSE UR STRIP.AUTO-MCNC: 100 MG/DL
HBA1C MFR BLD: 9.2 % (ref 0–5.6)
HYALINE CASTS #/AREA URNS LPF: ABNORMAL /LPF
KETONES UR STRIP.AUTO-MCNC: NEGATIVE MG/DL
LEUKOCYTE ESTERASE UR QL STRIP.AUTO: NEGATIVE
MICRO URNS: ABNORMAL
NITRITE UR QL STRIP.AUTO: NEGATIVE
PH UR STRIP.AUTO: 5.5 [PH] (ref 5–8)
POTASSIUM SERPL-SCNC: 3.8 MMOL/L (ref 3.6–5.5)
PROT SERPL-MCNC: 6.5 G/DL (ref 6–8.2)
PROT UR QL STRIP: 30 MG/DL
RBC # URNS HPF: ABNORMAL /HPF
RBC UR QL AUTO: NEGATIVE
SODIUM SERPL-SCNC: 136 MMOL/L (ref 135–145)
SP GR UR STRIP.AUTO: 1.02
T3FREE SERPL-MCNC: 2.13 PG/ML (ref 2–4.4)
TSH SERPL DL<=0.005 MIU/L-ACNC: 2.49 UIU/ML (ref 0.38–5.33)
UROBILINOGEN UR STRIP.AUTO-MCNC: 0.2 MG/DL
WBC #/AREA URNS HPF: ABNORMAL /HPF

## 2020-08-19 PROCEDURE — 80053 COMPREHEN METABOLIC PANEL: CPT

## 2020-08-19 PROCEDURE — 84207 ASSAY OF VITAMIN B-6: CPT

## 2020-08-19 PROCEDURE — 84439 ASSAY OF FREE THYROXINE: CPT

## 2020-08-19 PROCEDURE — 83036 HEMOGLOBIN GLYCOSYLATED A1C: CPT

## 2020-08-19 PROCEDURE — 83704 LIPOPROTEIN BLD QUAN PART: CPT

## 2020-08-19 PROCEDURE — 82652 VIT D 1 25-DIHYDROXY: CPT

## 2020-08-19 PROCEDURE — 84443 ASSAY THYROID STIM HORMONE: CPT

## 2020-08-19 PROCEDURE — 81001 URINALYSIS AUTO W/SCOPE: CPT

## 2020-08-19 PROCEDURE — 36415 COLL VENOUS BLD VENIPUNCTURE: CPT

## 2020-08-19 PROCEDURE — 80061 LIPID PANEL: CPT | Mod: XU

## 2020-08-19 PROCEDURE — 84481 FREE ASSAY (FT-3): CPT

## 2020-08-21 LAB — 1,25(OH)2D3 SERPL-MCNC: 30.3 PG/ML (ref 19.9–79.3)

## 2020-08-22 LAB
CHOLEST SERPL-MCNC: 100 MG/DL
HDL PARTICAL NO Q4363: 25.6 UMOL/L
HDL SIZE Q4361: 8.9 NM
HDLC SERPL-MCNC: 45 MG/DL (ref 40–59)
HLD.LARGE SERPL-SCNC: 3.4 UMOL/L
L VLDL PART NO Q4357: <1.5 NMOL/L
LDL SERPL QN: 20.9 NM
LDL SERPL-SCNC: 649 NMOL/L
LDL SMALL SERPL-SCNC: 445 NMOL/L
LDLC SERPL CALC-MCNC: 35 MG/DL
PATHOLOGY STUDY: ABNORMAL
TRIGL SERPL-MCNC: 100 MG/DL (ref 30–149)
VLDL SIZE Q4362: 46.3 NM

## 2020-08-23 LAB — VIT B6 SERPL-MCNC: 129.8 NMOL/L (ref 20–125)

## 2020-08-24 ENCOUNTER — PATIENT OUTREACH (OUTPATIENT)
Dept: HEALTH INFORMATION MANAGEMENT | Facility: OTHER | Age: 65
End: 2020-08-24

## 2020-08-24 NOTE — PROGRESS NOTES
8/24 CHW Kristine spoke to pt via telephone. Pt is doing well and has her follow up appts next week. Pt is on the phone with another line and requested CHW to call back.    Outcome: Pt will be d/c from CCM services due to lack of contact 8/28.

## 2020-08-27 LAB — T4 FREE SERPL DIALY-MCNC: 1.6 NG/DL (ref 1.1–2.4)

## 2020-09-08 NOTE — PROGRESS NOTES
"Chief Complaint   Patient presents with   • HTN (Controlled)   • Coronary Artery Disease       Subjective:   Anu Manuel is a 65 y.o. female who presents today for hospital follow up.    Patient was admitted on 8/17/2020 with chest pain that felt to be pleuritis. EKG did not show changes concerning for in-stent stenosis or thrombosis. Troponin remained flat (30-->28). TTE showed preserved LVEF with no pericardial effusion. Chest CT was negative for PE (did show incidental RUL nodule and emphysema).     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    Today patient states that she is continues to have GI issues of chronic epigastric discomfort. Her PCP placed her on Carafate and that has helped. Per patient she has an apt with GI on 9/28/2020.     Patient denies any concerns or complaints from a cardiology perspective. Review of home records show that patients pulse has been averaging in the 60's-70's, BP averaging 120's/60's.     Past Medical History:   Diagnosis Date   • Adverse effect of anesthesia     in 2008 \"throat closes up\"\"anxiety\" ?laryngospasm, kept in ICU. Pt states no problems currently 2018.    • Anesthesia     in 2008 \"throat closes up\"\"anxiety\" ?laryngospasm, kept in ICU. Pt states no problems currently 2018.    • Arthritis     right shoulder, hands   • Cigarette smoker quit 2013   • Dental disorder     missing teeth    • depression 8/30/2016    denies depression, states has anxiety and panic attacks   • Diabetes mellitus type 1 (HCC) 1989    insulin   • Encounter for long-term (current) use of insulin ([facility]) 9/25/2013   • Heart burn    • High cholesterol    • Hypertension    • Hypothyroidism, postsurgical 1970   • Indigestion    • Infectious disease      had hepatitis C, tested neg.   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;  Location: [facility];  Service: Neurosurgery   • SPINAL CORD STIMULATOR N/A 10/26/2018    Procedure: SPINAL CORD STIMULATOR;  Surgeon: Ryan Roman M.D.;  Location: [facility];  Service: Neurosurgery   • THORACIC LAMINECTOMY N/A 10/26/2018    Procedure: THORACIC LAMINECTOMY - FOR;  Surgeon: Ryan Roman M.D.;  Location: [facility];  Service: Neurosurgery   • PB INJ,FORAMEN,L/S,1 LEVEL Right 8/31/2016    Procedure: INJ-FORAMEN EPI LUM/SAC SNGL L4-5;  Surgeon: Sukhi Godfrey M.D.;  Location: [facility];  Service: Pain Management   • LUMBAR LAMINECTOMY DISKECTOMY Right 5/10/2016    Procedure: LUMBAR L4-5 HEMILAMINECTOMY DISKECTOMY ;  Surgeon: Arnold Keyes M.D.;  Location: [facility];  Service:    • CERVICAL FUSION POSTERIOR  1/16/2009    Performed by TARA CONTRERAS at [facility]   • HARDWARE REMOVAL NEURO  1/16/2009    Performed by TARA CONTRERAS at [facility]   • NECK EXPLORATION  1/16/2009    Performed by TARA CONTRERAS at [facility]   • SHOULDER ARTHROSCOPY W/ ROTATOR CUFF REPAIR  10/9/08    Performed by SHERLY CASTANEDA at [facility] ORS   • SHOULDER DECOMPRESSION ARTHROSCOPIC  6/17/08    Performed by SHERLY CASTANEDA at SUR[facility]ERY [facility] ORS   • CLAVICLE DISTAL EXCISION  6/17/08    Performed by SHERLY CASTANEDA at SUR[facility]ERY [facility] ORS   • CERVICAL DISK AND FUSION ANTERIOR  03/12/08   • HYSTERECTOMY, VAGINAL  2006   • APPENDECTOMY  2004   • THYROID LOBECTOMY  1973   • TONSILLECTOMY  1963   • ABDOMINAL HYSTERECTOMY TOTAL     • LUMPECTOMY  1976, 2005    Breast    • LUMPECTOMY       Family History   Problem Relation Age of Onset   • Hypertension Mother    • Cancer Father      Social History     Socioeconomic History   • Marital status:      Spouse name: Not on file   • Number of children: Not on file   • Years of education: Not on file   • Highest education level: Not on file   Occupational History   • Not on file   Social Needs   • Financial resource strain: Not hard at all   • Food insecurity     Worry: Never true     Inability: Never true   • Transportation needs     Medical: Yes     Non-medical: Yes   Tobacco Use   • Smoking status: Current Every Day Smoker     Packs/day: 1.00     Years: 30.00     Pack years: 30.00     Types: Cigarettes   • Smokeless tobacco: Never Used   • Tobacco comment: 1 ppd    Substance and Sexual Activity   • Alcohol use: No     Comment:     • Drug use: No   • Sexual activity: Not on file   Lifestyle   • Physical activity     Days per week: 0 days     Minutes per session: 0 min   • Stress: Not at all   Relationships   • Social connections     Talks on phone: More than three times a week     Gets together: More than three times a week     Attends [employer] service: Never     Active member of club or organization: No     Attends meetings of clubs or organizations: Never     Relationship status:    • Intimate partner violence     Fear of current or ex partner: No     Emotionally abused: No     Physically abused: No     Forced sexual activity: No   Other Topics Concern   • Not on file   Social History  Narrative   • Not on file     Allergies   Allergen Reactions   • Ativan Unspecified      Extreme Restlessness with whole body  [id number]   • Tape      Blisters, paper tape is ok     Outpatient Encounter Medications as of 9/10/2020   Medication Sig Dispense Refill   • TRESIBA FLEXTOUCH 100 UNIT/ML Solution Pen-injector 28 40 UNIT(S) SUBCUTANEOUS EVERY NIGHT AT BEDTIME E11.65     • Continuous Blood Gluc Sensor (FREESTYLE PARISA 14 DAY SENSOR) Misc 1 MISCELLANEOUS E10.42 CHANGE SENSOR EVERY 14 DAYS   E11.65     • levothyroxine (SYNTHROID) 175 MCG Tab Take 175 mcg by mouth Every morning on an empty stomach.     • Pyridoxine HCl (VITAMIN B6) 50 MG Tab Take  by mouth.     • mesalamine (LIALDA) 1.2 GM Tablet Delayed Response Take  by mouth every morning with breakfast.     • Biotin 5000 MCG Tab Take  by mouth.     • Cholecalciferol (VITAMIN D3 PO) Take 5,000 Units by mouth every day.     • ticagrelor (BRILINTA) 90 MG Tab tablet Take 1 Tab by mouth 2 Times a Day. 180 Tab 3   • metoprolol (LOPRESSOR) 100 MG Tab Take 1 Tab by mouth 2 Times a Day. 180 Tab 3   • aspirin 81 MG EC tablet Take 1 Tab by mouth every morning. 90 Tab 3   • amLODIPine (NORVASC) 10 MG Tab Take 1 Tab by mouth every evening. 30 Tab 11   • atorvastatin (LIPITOR) 40 MG Tab Take 1 Tab by mouth every evening. 90 Tab 3   • Insulin Degludec (TRESIBA) 100 UNIT/ML Solution Inject 15 Units as instructed every evening. 1 mL 0   • sucralfate (CARAFATE) 1 GM Tab Take 1 g by mouth 4 Times a Day,Before Meals and at Bedtime.     • traZODone (DESYREL) 100 MG Tab Take 100 mg by mouth at bedtime as needed for Sleep.     • insulin aspart (NOVOLOG) 100 UNIT/ML Solution Inject 2-10 Units as instructed 3 times a day before meals. 1 units for every 5 g of carbs   AND  Sliding Scale   150 - 200 = 1 units  201 - 250 = 2 units  251 - 300 = 3 units  301 - 350 = 4 units  351 - 400 = 5 units     • [DISCONTINUED] diazePAM (VALIUM) 5 MG Tab TAKE 1 TABLET BY MOUTH 30 MINUTES PRIOR TO  "APPOINTMENT AND SECOND TABLET AT TIME OF CHECKIN     • [DISCONTINUED] colchicine (COLCRYS) 0.6 MG Tab Take 1 Tab by mouth 2 times a day as needed (chest pain). (Patient not taking: Reported on 9/10/2020) 20 Tab 0   • [DISCONTINUED] atorvastatin (LIPITOR) 40 MG Tab Take 1 Tab by mouth every evening. 90 Tab 3   • [DISCONTINUED] amLODIPine (NORVASC) 10 MG Tab Take 1 Tab by mouth every evening. 30 Tab 11   • [DISCONTINUED] aspirin EC 81 MG EC tablet Take 81 mg by mouth every morning. 30 Tab    • [DISCONTINUED] metoprolol (LOPRESSOR) 100 MG Tab Take 1 Tab by mouth 2 Times a Day. 60 Tab 3   • [DISCONTINUED] ticagrelor (BRILINTA) 90 MG Tab tablet Take 1 Tab by mouth 2 Times a Day. 60 Tab 1   • [DISCONTINUED] levothyroxine (SYNTHROID) 150 MCG Tab Take 1 Tab by mouth Every morning on an empty stomach. (Patient not taking: Reported on 9/10/2020) 30 Tab 2     No facility-administered encounter medications on file as of 9/10/2020.      Review of Systems   Constitutional: Negative for malaise/fatigue and weight loss.   Respiratory: Negative for shortness of breath.    Cardiovascular: Negative for chest pain, palpitations, orthopnea, claudication, leg swelling and PND.   Gastrointestinal: Positive for abdominal pain and heartburn.   Neurological: Negative for dizziness and weakness.   All other systems reviewed and are negative.       Objective:   /58 (BP Location: Left arm, Patient Position: Sitting, BP Cuff Size: Adult)   Pulse 68   Ht 1.664 m (5' 5.5\")   Wt 69.4 kg (153 lb)   LMP 04/29/2005 (LMP Unknown)   SpO2 99%   BMI 25.07 kg/m²     Physical Exam   Constitutional: She is oriented to person, place, and time. She appears well-developed and well-nourished. No distress.   HENT:   Head: Normocephalic.   Eyes: EOM are normal.   Neck: No JVD present.   Cardiovascular: Normal rate, regular rhythm and normal heart sounds.   Pulmonary/Chest: Breath sounds normal. No respiratory distress.   Abdominal: Soft. There is no " abdominal tenderness.   Musculoskeletal:         General: No edema.   Neurological: She is alert and oriented to person, place, and time.   Skin: Skin is warm and dry.   Psychiatric: She has a normal mood and affect.       Assessment:     1. Antiplatelet or antithrombotic long-term use  CBC WITH DIFFERENTIAL    CANCELED: CBC WITHOUT DIFFERENTIAL   2. Coronary artery disease involving native coronary artery of native heart without angina pectoris  Basic Metabolic Panel   3. CAD S/P percutaneous coronary angioplasty     4. Essential hypertension     5. Dyslipidemia         Medical Decision Making:  Today's Assessment / Status / Plan:     Cardiac Imaging and Procedures Review  Cardiac Catheterization 8/10/2020:     Coronary Anatomy  Left Main: Normal  LAD: proximal 70% and mid 80% stenosis  LCx: Minimal luminal irregularities  RCA: Dominant, high anterior takeoff with diffuse luminal irregularities, no more than 40% stenosis in the mid segment     IMPRESSIONS:  1. Unstable angina due to obstructive single vessel CAD  2. Successful PCI of the proximal through mid LAD using iFR guidance  3. Normal LVEDP     Recommendations:  DAPT  Usual post PCI care     Pre-procedure diagnosis: Unstable angina  Post-procedure diagnosis: Same     Echocardiogram 8/11/2020:  CONCLUSIONS  Limited study.  Normal left ventricular chamber size.  Left ventricular ejection fraction is visually estimated to be 60%.  Normal regional wall motion.    Echocardiogram 8/18/2020:  CONCLUSIONS  Normal left ventricular systolic function.  Normal regional wall motion.  Normal pericardium without effusion.  Compared to the images of the prior study done 8/11/20 -  there has   been no significant change.         CT-CTA Chest 8/17/2020:  IMPRESSION:     1.  No pulmonary embolus. No acute abnormality in the chest.  2.  Emphysema.  3.  Incidental 6.5 mm nodule in the anterior inferior aspect of the right upper lobe is stable since prior study. Follow-up  guidelines for high and low risk patients are outlined below.     Assessment/Plan  CAD:  -S/P PCI with MINDY to the LAD X 2 on 8/10/2020.  -TTE on 8/11/2020 showed preserved LVEF with no WMA.   -LDL at goal (37).  -Thorough discussion regarding the importance and rationale of DAPT.   -Continue ASA 81 mg daily, Brilinta 90 mg BID, Atorvastatin 40 mg daily and Lopressor 100 mg BID.   -Repeat CBC and BMP.     Hypertension:  -Stable.     Chest Pain:  -Suspected to be GI in origin, FU with GI arranged.   -CT negative for PE or acute abnormality.     Patient will follow-up with Dr. Jamie Haywood as scheduled below or earlier if needed. Encouraged patient to contact our office should any questions or concerns arise meantime.  Patient understands and agrees with plan of care.    Future Appointments   Date Time Provider Department Center   12/10/2020 11:00 AM Jamie Peguero M.D. [facility] None        Please note that this dictation was created using voice recognition software. I have made every reasonable attempt to correct obvious errors, but I expect that there are errors of grammar and possibly content I did not discover before finalizing the note.

## 2020-09-10 ENCOUNTER — OFFICE VISIT (OUTPATIENT)
Dept: CARDIOLOGY | Facility: MEDICAL CENTER | Age: 65
End: 2020-09-10
Payer: MEDICARE

## 2020-09-10 VITALS
HEIGHT: 66 IN | BODY MASS INDEX: 24.59 KG/M2 | HEART RATE: 68 BPM | DIASTOLIC BLOOD PRESSURE: 58 MMHG | WEIGHT: 153 LBS | SYSTOLIC BLOOD PRESSURE: 122 MMHG | OXYGEN SATURATION: 99 %

## 2020-09-10 DIAGNOSIS — E78.5 DYSLIPIDEMIA: ICD-10-CM

## 2020-09-10 DIAGNOSIS — I25.10 CORONARY ARTERY DISEASE INVOLVING NATIVE CORONARY ARTERY OF NATIVE HEART WITHOUT ANGINA PECTORIS: ICD-10-CM

## 2020-09-10 DIAGNOSIS — Z98.61 CAD S/P PERCUTANEOUS CORONARY ANGIOPLASTY: ICD-10-CM

## 2020-09-10 DIAGNOSIS — I25.10 CAD S/P PERCUTANEOUS CORONARY ANGIOPLASTY: ICD-10-CM

## 2020-09-10 DIAGNOSIS — Z79.02 ANTIPLATELET OR ANTITHROMBOTIC LONG-TERM USE: ICD-10-CM

## 2020-09-10 DIAGNOSIS — I10 ESSENTIAL HYPERTENSION: ICD-10-CM

## 2020-09-10 PROCEDURE — 99214 OFFICE O/P EST MOD 30 MIN: CPT | Performed by: NURSE PRACTITIONER

## 2020-09-10 RX ORDER — ATORVASTATIN CALCIUM 40 MG/1
40 TABLET, FILM COATED ORAL EVERY EVENING
Qty: 90 TAB | Refills: 3 | Status: ON HOLD | OUTPATIENT
Start: 2020-09-10 | End: 2021-09-26

## 2020-09-10 RX ORDER — ASPIRIN 81 MG/1
81 TABLET ORAL EVERY MORNING
Qty: 90 TAB | Refills: 3 | Status: SHIPPED | OUTPATIENT
Start: 2020-09-10 | End: 2022-01-03

## 2020-09-10 RX ORDER — MESALAMINE 1.2 G/1
2.4 TABLET, DELAYED RELEASE ORAL
COMMUNITY
End: 2021-08-03

## 2020-09-10 RX ORDER — METOPROLOL TARTRATE 100 MG/1
100 TABLET ORAL 2 TIMES DAILY
Qty: 180 TAB | Refills: 3 | Status: SHIPPED | OUTPATIENT
Start: 2020-09-10 | End: 2021-08-03

## 2020-09-10 RX ORDER — INSULIN DEGLUDEC 100 U/ML
INJECTION, SOLUTION SUBCUTANEOUS
COMMUNITY
Start: 2020-08-25 | End: 2020-12-10

## 2020-09-10 RX ORDER — ANTIARTHRITIC COMBINATION NO.2 900 MG
10000 TABLET ORAL DAILY
COMMUNITY
End: 2021-08-03

## 2020-09-10 RX ORDER — AMLODIPINE BESYLATE 10 MG/1
10 TABLET ORAL EVERY EVENING
Qty: 30 TAB | Refills: 11 | Status: SHIPPED | OUTPATIENT
Start: 2020-09-10 | End: 2021-05-19

## 2020-09-10 RX ORDER — FLASH GLUCOSE SENSOR
KIT MISCELLANEOUS
COMMUNITY
Start: 2020-08-26 | End: 2021-11-24

## 2020-09-10 RX ORDER — DIAZEPAM 5 MG/1
TABLET ORAL
COMMUNITY
Start: 2020-08-26 | End: 2020-09-10

## 2020-09-10 RX ORDER — LEVOTHYROXINE SODIUM 175 UG/1
175 TABLET ORAL
COMMUNITY
End: 2021-08-03

## 2020-09-10 ASSESSMENT — ENCOUNTER SYMPTOMS
PALPITATIONS: 0
DIZZINESS: 0
WEIGHT LOSS: 0
PND: 0
ORTHOPNEA: 0
SHORTNESS OF BREATH: 0
HEARTBURN: 1
WEAKNESS: 0
CLAUDICATION: 0
ABDOMINAL PAIN: 1

## 2020-09-10 ASSESSMENT — FIBROSIS 4 INDEX: FIB4 SCORE: 1.01

## 2020-09-15 ENCOUNTER — HOSPITAL ENCOUNTER (OUTPATIENT)
Dept: LAB | Facility: MEDICAL CENTER | Age: 65
End: 2020-09-15
Attending: NURSE PRACTITIONER
Payer: MEDICARE

## 2020-09-15 DIAGNOSIS — I25.10 CORONARY ARTERY DISEASE INVOLVING NATIVE CORONARY ARTERY OF NATIVE HEART WITHOUT ANGINA PECTORIS: ICD-10-CM

## 2020-09-15 LAB
ANION GAP SERPL CALC-SCNC: 12 MMOL/L (ref 7–16)
BASOPHILS # BLD AUTO: 1.2 % (ref 0–1.8)
BASOPHILS # BLD: 0.11 K/UL (ref 0–0.12)
BUN SERPL-MCNC: 25 MG/DL (ref 8–22)
CALCIUM SERPL-MCNC: 9.8 MG/DL (ref 8.5–10.5)
CHLORIDE SERPL-SCNC: 104 MMOL/L (ref 96–112)
CO2 SERPL-SCNC: 24 MMOL/L (ref 20–33)
CREAT SERPL-MCNC: 1.3 MG/DL (ref 0.5–1.4)
EOSINOPHIL # BLD AUTO: 0.34 K/UL (ref 0–0.51)
EOSINOPHIL NFR BLD: 3.9 % (ref 0–6.9)
ERYTHROCYTE [DISTWIDTH] IN BLOOD BY AUTOMATED COUNT: 47.7 FL (ref 35.9–50)
GLUCOSE SERPL-MCNC: 93 MG/DL (ref 65–99)
HCT VFR BLD AUTO: 38.7 % (ref 37–47)
HGB BLD-MCNC: 12.5 G/DL (ref 12–16)
IMM GRANULOCYTES # BLD AUTO: 0.04 K/UL (ref 0–0.11)
IMM GRANULOCYTES NFR BLD AUTO: 0.5 % (ref 0–0.9)
LYMPHOCYTES # BLD AUTO: 1.33 K/UL (ref 1–4.8)
LYMPHOCYTES NFR BLD: 15.1 % (ref 22–41)
MCH RBC QN AUTO: 30.3 PG (ref 27–33)
MCHC RBC AUTO-ENTMCNC: 32.3 G/DL (ref 33.6–35)
MCV RBC AUTO: 93.9 FL (ref 81.4–97.8)
MONOCYTES # BLD AUTO: 0.85 K/UL (ref 0–0.85)
MONOCYTES NFR BLD AUTO: 9.6 % (ref 0–13.4)
NEUTROPHILS # BLD AUTO: 6.15 K/UL (ref 2–7.15)
NEUTROPHILS NFR BLD: 69.7 % (ref 44–72)
NRBC # BLD AUTO: 0 K/UL
NRBC BLD-RTO: 0 /100 WBC
PLATELET # BLD AUTO: 296 K/UL (ref 164–446)
PMV BLD AUTO: 10 FL (ref 9–12.9)
POTASSIUM SERPL-SCNC: 4.3 MMOL/L (ref 3.6–5.5)
RBC # BLD AUTO: 4.12 M/UL (ref 4.2–5.4)
SODIUM SERPL-SCNC: 140 MMOL/L (ref 135–145)
WBC # BLD AUTO: 8.8 K/UL (ref 4.8–10.8)

## 2020-09-15 PROCEDURE — 80048 BASIC METABOLIC PNL TOTAL CA: CPT

## 2020-09-15 PROCEDURE — 36415 COLL VENOUS BLD VENIPUNCTURE: CPT

## 2020-09-15 PROCEDURE — 85025 COMPLETE CBC W/AUTO DIFF WBC: CPT

## 2020-09-16 ENCOUNTER — TELEPHONE (OUTPATIENT)
Dept: CARDIOLOGY | Facility: MEDICAL CENTER | Age: 65
End: 2020-09-16

## 2020-09-16 DIAGNOSIS — E78.5 DYSLIPIDEMIA: ICD-10-CM

## 2020-09-16 DIAGNOSIS — I25.10 CAD S/P PERCUTANEOUS CORONARY ANGIOPLASTY: ICD-10-CM

## 2020-09-16 DIAGNOSIS — I10 ESSENTIAL HYPERTENSION: ICD-10-CM

## 2020-09-16 DIAGNOSIS — Z98.61 CAD S/P PERCUTANEOUS CORONARY ANGIOPLASTY: ICD-10-CM

## 2020-09-16 NOTE — TELEPHONE ENCOUNTER
Message  Received: Today  Message Contents   MARK Everett R.N.             Slight decline in renal function. Repeat BMP 4 weeks to reassess.          Called pt to discuss. Lab slip mailed out to pt for reminder, confirmed address on file. Verbalized understanding and appreciative of call.

## 2020-09-26 ENCOUNTER — APPOINTMENT (OUTPATIENT)
Dept: RADIOLOGY | Facility: MEDICAL CENTER | Age: 65
End: 2020-09-26
Attending: EMERGENCY MEDICINE
Payer: MEDICARE

## 2020-09-26 ENCOUNTER — HOSPITAL ENCOUNTER (EMERGENCY)
Facility: MEDICAL CENTER | Age: 65
End: 2020-09-26
Attending: EMERGENCY MEDICINE
Payer: MEDICARE

## 2020-09-26 VITALS
WEIGHT: 156.53 LBS | HEIGHT: 66 IN | HEART RATE: 83 BPM | RESPIRATION RATE: 24 BRPM | BODY MASS INDEX: 25.16 KG/M2 | SYSTOLIC BLOOD PRESSURE: 151 MMHG | DIASTOLIC BLOOD PRESSURE: 81 MMHG | OXYGEN SATURATION: 96 % | TEMPERATURE: 96.8 F

## 2020-09-26 DIAGNOSIS — R11.2 NON-INTRACTABLE VOMITING WITH NAUSEA, UNSPECIFIED VOMITING TYPE: ICD-10-CM

## 2020-09-26 DIAGNOSIS — D69.6 THIN BLOOD (HCC): ICD-10-CM

## 2020-09-26 LAB
ALBUMIN SERPL BCP-MCNC: 4 G/DL (ref 3.2–4.9)
ALBUMIN/GLOB SERPL: 1.5 G/DL
ALP SERPL-CCNC: 314 U/L (ref 30–99)
ALT SERPL-CCNC: 147 U/L (ref 2–50)
ANION GAP SERPL CALC-SCNC: 13 MMOL/L (ref 7–16)
APTT PPP: 26.4 SEC (ref 24.7–36)
AST SERPL-CCNC: 65 U/L (ref 12–45)
BASOPHILS # BLD AUTO: 0.9 % (ref 0–1.8)
BASOPHILS # BLD: 0.08 K/UL (ref 0–0.12)
BILIRUB SERPL-MCNC: 0.4 MG/DL (ref 0.1–1.5)
BUN SERPL-MCNC: 21 MG/DL (ref 8–22)
CALCIUM SERPL-MCNC: 8.8 MG/DL (ref 8.5–10.5)
CHLORIDE SERPL-SCNC: 103 MMOL/L (ref 96–112)
CO2 SERPL-SCNC: 23 MMOL/L (ref 20–33)
COVID ORDER STATUS COVID19: NORMAL
CREAT SERPL-MCNC: 0.87 MG/DL (ref 0.5–1.4)
EOSINOPHIL # BLD AUTO: 0.12 K/UL (ref 0–0.51)
EOSINOPHIL NFR BLD: 1.4 % (ref 0–6.9)
ERYTHROCYTE [DISTWIDTH] IN BLOOD BY AUTOMATED COUNT: 50.1 FL (ref 35.9–50)
GLOBULIN SER CALC-MCNC: 2.7 G/DL (ref 1.9–3.5)
GLUCOSE SERPL-MCNC: 132 MG/DL (ref 65–99)
HCT VFR BLD AUTO: 34.9 % (ref 37–47)
HGB BLD-MCNC: 11.3 G/DL (ref 12–16)
IMM GRANULOCYTES # BLD AUTO: 0.02 K/UL (ref 0–0.11)
IMM GRANULOCYTES NFR BLD AUTO: 0.2 % (ref 0–0.9)
INR PPP: 0.91 (ref 0.87–1.13)
LIPASE SERPL-CCNC: 7 U/L (ref 11–82)
LYMPHOCYTES # BLD AUTO: 1.43 K/UL (ref 1–4.8)
LYMPHOCYTES NFR BLD: 16.3 % (ref 22–41)
MCH RBC QN AUTO: 30.8 PG (ref 27–33)
MCHC RBC AUTO-ENTMCNC: 32.4 G/DL (ref 33.6–35)
MCV RBC AUTO: 95.1 FL (ref 81.4–97.8)
MONOCYTES # BLD AUTO: 0.64 K/UL (ref 0–0.85)
MONOCYTES NFR BLD AUTO: 7.3 % (ref 0–13.4)
NEUTROPHILS # BLD AUTO: 6.48 K/UL (ref 2–7.15)
NEUTROPHILS NFR BLD: 73.9 % (ref 44–72)
NRBC # BLD AUTO: 0 K/UL
NRBC BLD-RTO: 0 /100 WBC
PLATELET # BLD AUTO: 262 K/UL (ref 164–446)
PMV BLD AUTO: 9.6 FL (ref 9–12.9)
POTASSIUM SERPL-SCNC: 4 MMOL/L (ref 3.6–5.5)
PROT SERPL-MCNC: 6.7 G/DL (ref 6–8.2)
PROTHROMBIN TIME: 12.5 SEC (ref 12–14.6)
RBC # BLD AUTO: 3.67 M/UL (ref 4.2–5.4)
SODIUM SERPL-SCNC: 139 MMOL/L (ref 135–145)
WBC # BLD AUTO: 8.8 K/UL (ref 4.8–10.8)

## 2020-09-26 PROCEDURE — 99284 EMERGENCY DEPT VISIT MOD MDM: CPT | Mod: 25

## 2020-09-26 PROCEDURE — C9803 HOPD COVID-19 SPEC COLLECT: HCPCS | Performed by: EMERGENCY MEDICINE

## 2020-09-26 PROCEDURE — 85730 THROMBOPLASTIN TIME PARTIAL: CPT

## 2020-09-26 PROCEDURE — 85025 COMPLETE CBC W/AUTO DIFF WBC: CPT

## 2020-09-26 PROCEDURE — U0003 INFECTIOUS AGENT DETECTION BY NUCLEIC ACID (DNA OR RNA); SEVERE ACUTE RESPIRATORY SYNDROME CORONAVIRUS 2 (SARS-COV-2) (CORONAVIRUS DISEASE [COVID-19]), AMPLIFIED PROBE TECHNIQUE, MAKING USE OF HIGH THROUGHPUT TECHNOLOGIES AS DESCRIBED BY CMS-2020-01-R: HCPCS

## 2020-09-26 PROCEDURE — 71045 X-RAY EXAM CHEST 1 VIEW: CPT

## 2020-09-26 PROCEDURE — 83690 ASSAY OF LIPASE: CPT

## 2020-09-26 PROCEDURE — 85610 PROTHROMBIN TIME: CPT

## 2020-09-26 PROCEDURE — 80053 COMPREHEN METABOLIC PANEL: CPT

## 2020-09-26 RX ORDER — ONDANSETRON 4 MG/1
4 TABLET, ORALLY DISINTEGRATING ORAL EVERY 6 HOURS PRN
Qty: 8 TAB | Refills: 0 | Status: SHIPPED | OUTPATIENT
Start: 2020-09-26 | End: 2021-08-03

## 2020-09-26 RX ORDER — OMEPRAZOLE 40 MG/1
40 CAPSULE, DELAYED RELEASE ORAL DAILY
Qty: 30 CAP | Refills: 0 | Status: SHIPPED | OUTPATIENT
Start: 2020-09-26 | End: 2021-08-03

## 2020-09-26 ASSESSMENT — LIFESTYLE VARIABLES
DO YOU DRINK ALCOHOL: NO
EVER FELT BAD OR GUILTY ABOUT YOUR DRINKING: NO
TOTAL SCORE: 0
DOES PATIENT WANT TO STOP DRINKING: NO
HAVE PEOPLE ANNOYED YOU BY CRITICIZING YOUR DRINKING: NO
EVER HAD A DRINK FIRST THING IN THE MORNING TO STEADY YOUR NERVES TO GET RID OF A HANGOVER: NO
CONSUMPTION TOTAL: NEGATIVE
ON A TYPICAL DAY WHEN YOU DRINK ALCOHOL HOW MANY DRINKS DO YOU HAVE: 0
TOTAL SCORE: 0
HOW MANY TIMES IN THE PAST YEAR HAVE YOU HAD 5 OR MORE DRINKS IN A DAY: 0
AVERAGE NUMBER OF DAYS PER WEEK YOU HAVE A DRINK CONTAINING ALCOHOL: 0
TOTAL SCORE: 0
HAVE YOU EVER FELT YOU SHOULD CUT DOWN ON YOUR DRINKING: NO

## 2020-09-26 ASSESSMENT — FIBROSIS 4 INDEX: FIB4 SCORE: 0.95

## 2020-09-27 NOTE — ED TRIAGE NOTES
Chief Complaint   Patient presents with   • N/V     Since 9/25/20 evening, described as coffee ground emisis   • Bleeding/Bruising     Right hand bruising, since 0200 9/26/20   • Leg Swelling     Recent right leg swelling 9/25/20 but has since resolved

## 2020-09-27 NOTE — ED PROVIDER NOTES
ED Provider Note    CHIEF COMPLAINT  Chief Complaint   Patient presents with   • N/V     Since 9/25/20 evening, described as coffee ground emisis   • Bleeding/Bruising     Right hand bruising, since 0200 9/26/20   • Leg Swelling     Recent right leg swelling 9/25/20 but has since resolved       HPI  Anu Manuel is a 65 y.o. female who presents to the emergency room with chief complaint of nausea and vomiting since yesterday evening.  She states that just kind of came out of nowhere she felt really nauseated she had a bunch episodes of nausea vomiting by the end it was a little bit darker in nature.  She is on Brilinta for history of ACS and stent placement.  She states that she is had some easy bleeding which she expects she had a bruise on the back of her hand which is getting a little worse and her leg felt swollen but then got better on its own she does not actually have any calf pain or trauma down there.  She did not take her blood to this morning because she had those darker episodes of emesis.  No dark or bloody stools.  She does have follow-up with GI on Monday because when she got started on her blood thinner she did have some blood from her rectum in [place] where her stents were placed.    She states she has some problems with epigastric burning especially after she eats and early satiety she does not take any antacids but only as needed.  She does endorse she is also had a mild cough but no chest pain or shortness of breath    REVIEW OF SYSTEMS  Positives as above. Pertinent negatives include diarrhea constipation black or tarry stools bright red blood per rectum currently chest pain shortness of breath bilateral leg swelling abdominal pain flank pain back pain fevers chills sore throat  All other review of systems are negative    PAST MEDICAL HISTORY   has a past medical history of Adverse effect of anesthesia, Anesthesia, Arthritis, Cigarette smoker (quit 2013), Dental disorder,  depression (8/30/2016), Diabetes mellitus type 1 (HCC) (1989), Encounter for long-term (current) use of insulin (HCC) (9/25/2013), Heart burn, High cholesterol, Hypertension, Hypothyroidism, postsurgical (1970), Indigestion, Infectious disease, Joint replacement, Pain, Polyneuropathy in diabetes(357.2) (6/10/2015), Status post appendectomy, and 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    SOCIAL HISTORY  Social History     Tobacco Use   • Smoking status: Current Every Day Smoker     Packs/day: 1.00     Years: 30.00     Pack years: 30.00     Types: Cigarettes   • Smokeless tobacco: Never Used   • Tobacco comment: 1 ppd    Substance and Sexual Activity   • Alcohol use: No     Comment:     • Drug use: No   • Sexual activity: Not on file       SURGICAL HISTORY   has a past surgical history that includes hysterectomy, vaginal (2006); thyroid lobectomy (1973); lumpectomy (1976, 2005); cervical disk and fusion anterior (03/12/08); tonsillectomy (1963); cervical fusion posterior (1/16/2009); hardware removal neuro (1/16/2009); neck exploration (1/16/2009); abdominal hysterectomy total; lumpectomy; lumbar laminectomy diskectomy (Right, 5/10/2016); shoulder decompression arthroscopic (6/17/08); clavicle distal excision (6/17/08); shoulder arthroscopy w/ rotator cuff repair (10/9/08); inj,foramen,l/s,1 level (Right, 8/31/2016); spinal cord stimulator (N/A, 10/26/2018); thoracic laminectomy (N/A, 10/26/2018); appendectomy (2004); implant neurostim/ (N/A, 12/16/2019); irrigation & debridement neuro (1/19/2020); and cath placement (1/25/2020).    CURRENT MEDICATIONS  Home Medications     Reviewed by Jorge Luis Ferrer R.N. (Registered Nurse) on 09/26/20 at 1912  Med List Status: Partial   Medication Last Dose Status   amLODIPine (NORVASC) 10 MG Tab  Active   aspirin 81 MG EC tablet  Active   atorvastatin (LIPITOR) 40 MG Tab  Active   Biotin 5000 MCG Tab   "Active   Cholecalciferol (VITAMIN D3 PO)  Active   Continuous Blood Gluc Sensor (FREESTYLE PARISA 14 DAY SENSOR) Misc  Active   insulin aspart (NOVOLOG) 100 UNIT/ML Solution  Active   Insulin Degludec (TRESIBA) 100 UNIT/ML Solution  Active   levothyroxine (SYNTHROID) 175 MCG Tab  Active   mesalamine (LIALDA) 1.2 GM Tablet Delayed Response  Active   metoprolol (LOPRESSOR) 100 MG Tab  Active   Pyridoxine HCl (VITAMIN B6) 50 MG Tab  Active   sucralfate (CARAFATE) 1 GM Tab  Active   ticagrelor (BRILINTA) 90 MG Tab tablet  Active   traZODone (DESYREL) 100 MG Tab  Active   TRESIBA FLEXTOUCH 100 UNIT/ML Solution Pen-injector  Active                ALLERGIES  Allergies   Allergen Reactions   • Ativan Unspecified      Extreme Restlessness with whole body  FSV=[id number]   • Tape      Blisters, paper tape is ok       PHYSICAL EXAM  VITAL SIGNS: /79   Pulse 78   Temp 36 °C (96.8 °F) (Temporal)   Resp 16   Ht 1.676 m (5' 6\")   Wt 71 kg (156 lb 8.4 oz)   LMP 04/29/2005 (LMP Unknown)   SpO2 96%   BMI 25.26 kg/m²    Pulse ox interpretation: I interpret this pulse ox as normal.  Constitutional: Alert in no apparent distress.  HENT: Normocephalic atraumatic, mildly dry mucous membranes  Eyes: PER, Conjunctiva normal, Non-icteric.   Neck: Normal range of motion, No tenderness, Supple, No stridor.   Cardiovascular: Regular rate and rhythm, no murmurs.   Thorax & Lungs: Normal breath sounds, No respiratory distress, No wheezing, No chest tenderness.   Abdomen: Bowel sounds normal, Soft, No tenderness, No pulsatile masses. No peritoneal signs.  Guaiac NEGATIVE BROWN STOOL  Skin: Warm, Dry, No erythema, No rash.  Ecchymosis to the back of the right hand encompassing the whole hand but not overtly swollen  Back: No bony tenderness, No CVA tenderness.   Extremities/MSK: Intact equal distal pulses, No edema, No tenderness, No cyanosis, no major deformities noted  Neurologic: Alert and oriented x3, No focal deficits noted. " "      DIFFERENTIAL DIAGNOSIS AND WORK UP PLAN    This is a 65 y.o. female who presents with multiple complaints mostly nausea and vomiting concern for bloody emesis on a blood thinner,, she was brown stool guaiac negative and has not vomited while she still feels mildly nauseated, especially with the cough and the vomiting this could be an atypical COVID she not having any chest pains have low concern for ACS she will be treated with antiemetics laboratory analysis and reassess    DIAGNOSTIC STUDIES / PROCEDURES    EKG      LABS  Pertinent Lab Findings  CBC with a hemoglobin of 11.3 was 12.5 a month ago left shift but normal platelet count mildly elevated LFTs normal lipase normal coags COVID was sent      RADIOLOGY  DX-CHEST-PORTABLE (1 VIEW)   Final Result         1. No acute cardiopulmonary abnormalities are identified.        The radiologist's interpretation of all radiological studies have been reviewed by me.      COURSE & MEDICAL DECISION MAKING  Pertinent Labs & Imaging studies reviewed. (See chart for details)    10:27 PM  I reassessed patient at the bedside I reexamined her abdomen is soft nontender there is no right upper quadrant tenderness she is not a daily drinker, COVID was sent and is pending her chest x-ray is within normal limits she is not hypoxic she is feeling better and less nauseated this could have just been secondary to acid reflux that she has having early satiety and burning sensation she will be started on Protonix at home as well as given Zofran.  She will return to the emergency department for any new or worsening issues is no evidence at this time of acute GI bleed she was instructed to restart her Brilinta tomorrow and she is following up with GI on Monday    /81   Pulse 83   Temp 36 °C (96.8 °F) (Temporal)   Resp (!) 24   Ht 1.676 m (5' 6\")   Wt 71 kg (156 lb 8.4 oz)   LMP 04/29/2005 (LMP Unknown)   SpO2 96%   BMI 25.26 kg/m²       I verified that the patient was " wearing a mask and I was wearing appropriate PPE every time I entered the room. The patient's mask was on the patient at all times during my encounter except for a brief view of the oropharynx.    The patient will return for new or worsening symptoms and is stable at the time of discharge.    The patient is referred to a primary physician for blood pressure management, diabetic screening, and for all other preventative health concerns.    DISPOSITION:  Patient will be discharged home in stable condition.    FOLLOW UP:  Jarrell Yates M.D.  [address]  [address]  [facility] [address]  [phone]    Schedule an appointment as soon as possible for a visit       [facility], Emergency Dept  [address] [address] [phone]    If symptoms worsen    [facility]  [address] [address] [phone]  Go on 9/28/2020        OUTPATIENT MEDICATIONS:  Discharge Medication List as of 9/26/2020 10:42 PM      START taking these medications    Details   ondansetron (ZOFRAN ODT) 4 MG TABLET DISPERSIBLE Take 1 Tab by mouth every 6 hours as needed., Disp-8 Tab,R-0, Normal      omeprazole (PRILOSEC) 40 MG delayed-release capsule Take 1 Cap by mouth every day., Disp-30 Cap,R-0, Normal             FINAL IMPRESSION  1. Non-intractable vomiting with nausea, unspecified vomiting type     2. Thin blood (HCC)             Electronically signed by: Anny Valdes M.D., 9/26/2020 9:04 PM    This dictation has been created using voice recognition software and/or scribes. The accuracy of the dictation is limited by the abilities of the software and the expertise of the scribes. I expect there may be some errors of grammar and possibly content. I made every attempt to manually correct the errors within my dictation. However, errors related to voice recognition software and/or scribes may still exist and should be interpreted within the appropriate context.

## 2020-09-28 LAB
SARS-COV-2 RNA RESP QL NAA+PROBE: NOTDETECTED
SPECIMEN SOURCE: NORMAL

## 2020-12-10 ENCOUNTER — DOCUMENTATION (OUTPATIENT)
Dept: CARDIOLOGY | Facility: MEDICAL CENTER | Age: 65
End: 2020-12-10

## 2020-12-10 ENCOUNTER — TELEMEDICINE (OUTPATIENT)
Dept: CARDIOLOGY | Facility: MEDICAL CENTER | Age: 65
End: 2020-12-10
Payer: MEDICARE

## 2020-12-10 VITALS — HEIGHT: 66 IN | SYSTOLIC BLOOD PRESSURE: 154 MMHG | DIASTOLIC BLOOD PRESSURE: 74 MMHG | BODY MASS INDEX: 25.65 KG/M2

## 2020-12-10 DIAGNOSIS — Z95.5 S/P DRUG ELUTING CORONARY STENT PLACEMENT: ICD-10-CM

## 2020-12-10 DIAGNOSIS — I10 HYPERTENSION, ESSENTIAL: ICD-10-CM

## 2020-12-10 DIAGNOSIS — Z72.0 TOBACCO ABUSE: ICD-10-CM

## 2020-12-10 DIAGNOSIS — E78.5 DYSLIPIDEMIA: ICD-10-CM

## 2020-12-10 DIAGNOSIS — R79.89 LFTS ABNORMAL: ICD-10-CM

## 2020-12-10 PROCEDURE — 99215 OFFICE O/P EST HI 40 MIN: CPT | Mod: 95,CR | Performed by: INTERNAL MEDICINE

## 2020-12-10 RX ORDER — LISINOPRIL AND HYDROCHLOROTHIAZIDE 25; 20 MG/1; MG/1
0.5 TABLET ORAL DAILY
Qty: 90 TAB | Refills: 3 | Status: SHIPPED | OUTPATIENT
Start: 2020-12-10 | End: 2021-08-03

## 2020-12-10 RX ORDER — CLOPIDOGREL BISULFATE 75 MG/1
75 TABLET ORAL DAILY
Qty: 90 TAB | Refills: 3 | Status: SHIPPED | OUTPATIENT
Start: 2020-12-10 | End: 2021-04-01

## 2020-12-10 NOTE — PROGRESS NOTES
Cardiology Telemedicine Visit: Established Patient   This encounter was conducted via Zoom.   Verbal consent was obtained. Patient's identity was verified.    Assessment and Plan:   PCP: Jarrell Yates M.D.  The following treatment plan was discussed:     1. S/P drug eluting coronary stent placement  MINDY x2 to LAD 8/10/2020 for unstable angina.  Fully revascularized at that time.  LVEF normal.  She is having recurrent nonanginal chest discomfort, some of which may be related to ticagrelor.    -Ticagrelor changed to Plavix, continue aspirin  -Continue beta-blocker and amlodipine    2. Hypertension, essential  Elevated today.  I recommended adding Prinzide 10/12.5.  Metabolic panel to be obtained in several weeks.    3. Dyslipidemia  Well-controlled but with elevated LFTs.  Update lipid profile in a few weeks    4. LFTs abnormal    5. Tobacco abuse  Deferred discussion today.    6.  Grieving  Her  of 42 years is in hospice and she anticipates he will pass away today.      Follow up: Return in about 3 months (around 3/10/2021).    Subjective:     Chief Complaint   Patient presents with   • Hypertension     History: Anu Manuel is a 65 y.o. female with a past medical history of hypertension, dyslipidemia, diabetes and tobacco use presenting for follow up of coronary artery disease.  In August she was hospitalized for unstable anginaAnd was found to have severe stenosis of the LAD which was successfully revascularized with 2 drug-eluting stents.  Since that time she has experienced episodic discomfort in the chest, partially reminiscent of angina.  These episodes occur without provocation and are never reproduced during physical activity and they resolve with taking an additional aspirin tablet.  She also reports sporadic episodes of breathlessness and high cost of ticagrelor.  Additionally, blood pressure recordings have been elevated at systolic of 150 mmHg lately.  She has been under a tremendous  "amount of stress as her  of 42 years suffered a stroke recently and is currently in hospice and is expected to pass away today        ROS  Denies any recent fevers or chills. No nausea or vomiting. No chest pains or shortness of breath. All other systems reviewed and negative except as per the HPI       Objective:   Vitals obtained by patient:  Respirations through observation: 14  /74   Ht 1.664 m (5' 5.5\")   LMP 04/29/2005 (LMP Unknown)   BMI 25.65 kg/m²     Physical Exam:  Constitutional: Alert, no distress, well-groomed.  Skin: No rashes in visible areas.  Eye: Round. Conjunctiva clear, lids normal. No icterus.   ENMT: Lips pink without lesions, good dentition, moist mucous membranes. Phonation normal.  Neck: No masses, no thyromegaly. Moves freely without pain.  CV: Pulse as reported by patient  Respiratory: Unlabored respiratory effort, no cough or audible wheeze  Psych: Alert and oriented x3, normal affect and mood.     Allergies   Allergen Reactions   • Ativan Unspecified      Extreme Restlessness with whole body  [id number]   • Tape      Blisters, paper tape is ok     Current medicines (including changes today)  Current Outpatient Medications   Medication Sig Dispense Refill   • clopidogrel (PLAVIX) 75 MG Tab Take 1 Tab by mouth every day. Take four tablets on the first dose then one thereafter 90 Tab 3   • lisinopril-hydrochlorothiazide (PRINZIDE) 20-25 MG per tablet Take 0.5 Tabs by mouth every day. 90 Tab 3   • ondansetron (ZOFRAN ODT) 4 MG TABLET DISPERSIBLE Take 1 Tab by mouth every 6 hours as needed. 8 Tab 0   • omeprazole (PRILOSEC) 40 MG delayed-release capsule Take 1 Cap by mouth every day. 30 Cap 0   • levothyroxine (SYNTHROID) 175 MCG Tab Take 175 mcg by mouth Every morning on an empty stomach.     • Pyridoxine HCl (VITAMIN B6) 50 MG Tab Take  by mouth.     • mesalamine (LIALDA) 1.2 GM Tablet Delayed Response Take  by mouth every morning with breakfast.     • Biotin 5000 MCG Tab " Take  by mouth.     • Cholecalciferol (VITAMIN D3 PO) Take 5,000 Units by mouth every day.     • metoprolol (LOPRESSOR) 100 MG Tab Take 1 Tab by mouth 2 Times a Day. 180 Tab 3   • aspirin 81 MG EC tablet Take 1 Tab by mouth every morning. 90 Tab 3   • amLODIPine (NORVASC) 10 MG Tab Take 1 Tab by mouth every evening. 30 Tab 11   • atorvastatin (LIPITOR) 40 MG Tab Take 1 Tab by mouth every evening. 90 Tab 3   • Insulin Degludec (TRESIBA) 100 UNIT/ML Solution Inject 15 Units as instructed every evening. 1 mL 0   • sucralfate (CARAFATE) 1 GM Tab Take 1 g by mouth 4 Times a Day,Before Meals and at Bedtime.     • traZODone (DESYREL) 100 MG Tab Take 100 mg by mouth at bedtime as needed for Sleep.     • insulin aspart (NOVOLOG) 100 UNIT/ML Solution Inject 2-10 Units as instructed 3 times a day before meals. 1 units for every 5 g of carbs   AND  Sliding Scale   150 - 200 = 1 units  201 - 250 = 2 units  251 - 300 = 3 units  301 - 350 = 4 units  351 - 400 = 5 units     • Continuous Blood Gluc Sensor (FREESTYLE PARISA 14 DAY SENSOR) Misc 1 MISCELLANEOUS E10.42 CHANGE SENSOR EVERY 14 DAYS   E11.65       No current facility-administered medications for this visit.      Patient Active Problem List    Diagnosis Date Noted   • Hemoptysis 06/23/2020     Priority: High   • RHEA (acute kidney injury) (HCC) 01/21/2020     Priority: High   • Post-operative wound abscess 01/18/2020     Priority: High   • Cellulitis 03/15/2018     Priority: High   • CKD (chronic kidney disease) stage 3, GFR 30-59 ml/min 06/23/2020     Priority: Medium   • Lung nodule 06/23/2020     Priority: Medium   • Hypertension 01/24/2020     Priority: Medium   • Hypoglycemia 01/22/2020     Priority: Medium   • Type 1 diabetes mellitus with neurological manifestations, uncontrolled (HCC) 06/10/2015     Priority: Medium   • Type 1 diabetes mellitus with kidney complication ([facility])      Priority: Medium   • Dyslipidemia 06/23/2020     Priority: Low   • GERD (gastroesophageal  reflux disease) 06/23/2020     Priority: Low   • Tobacco abuse 01/18/2020     Priority: Low   • Diabetic polyneuropathy (CMS-HCC) 06/10/2015     Priority: Low   • Hypothyroidism, postsurgical      Priority: Low   • Pain in the chest 08/17/2020   • CAD S/P percutaneous coronary angioplasty 08/11/2020   • Hypothyroidism in adult 08/10/2020   • Anxiety 01/25/2020   • Nausea & vomiting 01/25/2020   • Chronic pain of right lower extremity 03/15/2018   • Acute left lumbar radiculopathy 08/31/2016   • Lumbar spinal stenosis 05/10/2016   • Vertigo 04/08/2015   • Encounter for long-term (current) use of insulin ([facility]) 09/25/2013   • Accidental drug overdose 08/27/2012   • Vitamin d deficiency 03/14/2012   • Cigarette smoker      Family History   Problem Relation Age of Onset   • Hypertension Mother    • Cancer Father      She  has a past medical history of Adverse effect of anesthesia, Anesthesia, Arthritis, Cigarette smoker (quit 2013), Dental disorder, depression (8/30/2016), Diabetes mellitus type 1 ([facility]) (1989), Encounter for long-term (current) use of insulin ([facility]) (9/25/2013), Heart burn, High cholesterol, Hypertension, Hypothyroidism, postsurgical (1970), Indigestion, Infectious disease, Joint replacement, Pain, Polyneuropathy in diabetes(357.2) (6/10/2015), Status post appendectomy, and 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  She  has a past surgical history that includes hysterectomy, vaginal (2006); thyroid lobectomy (1973); lumpectomy (1976, 2005); cervical disk and fusion anterior (03/12/08); tonsillectomy (1963); cervical fusion posterior (1/16/2009); hardware removal neuro (1/16/2009); neck exploration (1/16/2009); abdominal hysterectomy total; lumpectomy; lumbar laminectomy diskectomy (Right, 5/10/2016); shoulder decompression arthroscopic (6/17/08); clavicle distal excision (6/17/08); shoulder arthroscopy w/ rotator cuff repair (10/9/08); pr  "inj,foramen,l/s,1 level (Right, 8/31/2016); spinal cord stimulator (N/A, 10/26/2018); thoracic laminectomy (N/A, 10/26/2018); appendectomy (2004); pr implant neurostim/ (N/A, 12/16/2019); irrigation & debridement neuro (1/19/2020); and cath placement (1/25/2020).  Past Medical History:   Diagnosis Date   • Adverse effect of anesthesia     in 2008 \"throat closes up\"\"anxiety\" ?laryngospasm, kept in ICU. Pt states no problems currently 2018.    • Anesthesia     in 2008 \"throat closes up\"\"anxiety\" ?laryngospasm, kept in ICU. Pt states no problems currently 2018.    • Arthritis     right shoulder, hands   • Cigarette smoker quit 2013   • Dental disorder     missing teeth    • depression 8/30/2016    denies depression, states has anxiety and panic attacks   • Diabetes mellitus type 1 ([facility]) 1989    insulin   • Encounter for long-term (current) use of insulin ([facility]) 9/25/2013   • Heart burn    • High cholesterol    • Hypertension    • Hypothyroidism, postsurgical 1970   • Indigestion    • Infectious disease      had hepatitis C, tested neg.   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.63) 6/10/2015     Allergies   Allergen Reactions   • Ativan Unspecified      Extreme Restlessness with whole body  [id number]=[id number]   • Tape      Blisters, paper tape is ok     Outpatient Encounter Medications as of 12/10/2020   Medication Sig Dispense Refill   • clopidogrel (PLAVIX) 75 MG Tab Take 1 Tab by mouth every day. Take four tablets on the first dose then one thereafter 90 Tab 3   • lisinopril-hydrochlorothiazide (PRINZIDE) 20-25 MG per tablet Take 0.5 Tabs by mouth every day. 90 Tab 3   • ondansetron (ZOFRAN ODT) 4 MG TABLET DISPERSIBLE Take 1 Tab by mouth every 6 hours as needed. 8 Tab 0   • omeprazole (PRILOSEC) 40 " MG delayed-release capsule Take 1 Cap by mouth every day. 30 Cap 0   • levothyroxine (SYNTHROID) 175 MCG Tab Take 175 mcg by mouth Every morning on an empty stomach.     • Pyridoxine HCl (VITAMIN B6) 50 MG Tab Take  by mouth.     • mesalamine (LIALDA) 1.2 GM Tablet Delayed Response Take  by mouth every morning with breakfast.     • Biotin 5000 MCG Tab Take  by mouth.     • Cholecalciferol (VITAMIN D3 PO) Take 5,000 Units by mouth every day.     • metoprolol (LOPRESSOR) 100 MG Tab Take 1 Tab by mouth 2 Times a Day. 180 Tab 3   • aspirin 81 MG EC tablet Take 1 Tab by mouth every morning. 90 Tab 3   • amLODIPine (NORVASC) 10 MG Tab Take 1 Tab by mouth every evening. 30 Tab 11   • atorvastatin (LIPITOR) 40 MG Tab Take 1 Tab by mouth every evening. 90 Tab 3   • Insulin Degludec (TRESIBA) 100 UNIT/ML Solution Inject 15 Units as instructed every evening. 1 mL 0   • sucralfate (CARAFATE) 1 GM Tab Take 1 g by mouth 4 Times a Day,Before Meals and at Bedtime.     • traZODone (DESYREL) 100 MG Tab Take 100 mg by mouth at bedtime as needed for Sleep.     • insulin aspart (NOVOLOG) 100 UNIT/ML Solution Inject 2-10 Units as instructed 3 times a day before meals. 1 units for every 5 g of carbs   AND  Sliding Scale   150 - 200 = 1 units  201 - 250 = 2 units  251 - 300 = 3 units  301 - 350 = 4 units  351 - 400 = 5 units     • Continuous Blood Gluc Sensor (FREESTYLE PARISA 14 DAY SENSOR) Misc 1 MISCELLANEOUS E10.42 CHANGE SENSOR EVERY 14 DAYS   E11.65     • [DISCONTINUED] TRESIBA FLEXTOUCH 100 UNIT/ML Solution Pen-injector 28 40 UNIT(S) SUBCUTANEOUS EVERY NIGHT AT BEDTIME E11.65     • [DISCONTINUED] ticagrelor (BRILINTA) 90 MG Tab tablet Take 1 Tab by mouth 2 Times a Day. 180 Tab 3     No facility-administered encounter medications on file as of 12/10/2020.      Social History     Socioeconomic History   • Marital status:      Spouse name: Not on file   • Number of children: Not on file   • Years of education: Not on file   •  Highest education level: Not on file   Occupational History   • Not on file   Social Needs   • Financial resource strain: Not hard at all   • Food insecurity     Worry: Never true     Inability: Never true   • Transportation needs     Medical: Yes     Non-medical: Yes   Tobacco Use   • Smoking status: Current Every Day Smoker     Packs/day: 1.00     Years: 30.00     Pack years: 30.00     Types: Cigarettes   • Smokeless tobacco: Never Used   • Tobacco comment: 1 ppd    Substance and Sexual Activity   • Alcohol use: No     Comment:     • Drug use: No   • Sexual activity: Not on file   Lifestyle   • Physical activity     Days per week: 0 days     Minutes per session: 0 min   • Stress: Not at all   Relationships   • Social connections     Talks on phone: More than three times a week     Gets together: More than three times a week     Attends [employer] service: Never     Active member of club or organization: No     Attends meetings of clubs or organizations: Never     Relationship status:    • Intimate partner violence     Fear of current or ex partner: No     Emotionally abused: No     Physically abused: No     Forced sexual activity: No   Other Topics Concern   • Not on file   Social History Narrative   • Not on file       Studies  Lab Results   Component Value Date/Time    CHOLSTRLTOT 100 08/19/2020 06:52 AM    CHOLSTRLTOT 96 (L) 08/10/2020 05:03 AM    LDL 37 08/10/2020 05:03 AM    HDL 45 08/19/2020 06:52 AM    HDL 43 08/10/2020 05:03 AM    TRIGLYCERIDE 100 08/19/2020 06:52 AM    TRIGLYCERIDE 80 08/10/2020 05:03 AM       Lab Results   Component Value Date/Time    SODIUM 139 09/26/2020 09:20 PM    POTASSIUM 4.0 09/26/2020 09:20 PM    CHLORIDE 103 09/26/2020 09:20 PM    CO2 23 09/26/2020 09:20 PM    GLUCOSE 132 (H) 09/26/2020 09:20 PM    BUN 21 09/26/2020 09:20 PM    CREATININE 0.87 09/26/2020 09:20 PM    CREATININE 1.0 01/08/2009 04:31 PM     Lab Results   Component Value Date/Time    ALKPHOSPHAT 314 (H)  09/26/2020 09:20 PM    ASTSGOT 65 (H) 09/26/2020 09:20 PM    ALTSGPT 147 (H) 09/26/2020 09:20 PM    TBILIRUBIN 0.4 09/26/2020 09:20 PM        For this encounter I reviewed medical records    For this encounter I directly reviewed catherization films. I agree with the interpretations in the electronic health record

## 2021-02-09 ENCOUNTER — HOSPITAL ENCOUNTER (OUTPATIENT)
Dept: LAB | Facility: MEDICAL CENTER | Age: 66
End: 2021-02-09
Attending: INTERNAL MEDICINE
Payer: MEDICARE

## 2021-02-09 ENCOUNTER — HOSPITAL ENCOUNTER (OUTPATIENT)
Dept: LAB | Facility: MEDICAL CENTER | Age: 66
End: 2021-02-09
Attending: NURSE PRACTITIONER
Payer: MEDICARE

## 2021-02-09 DIAGNOSIS — I25.10 CAD S/P PERCUTANEOUS CORONARY ANGIOPLASTY: ICD-10-CM

## 2021-02-09 DIAGNOSIS — Z98.61 CAD S/P PERCUTANEOUS CORONARY ANGIOPLASTY: ICD-10-CM

## 2021-02-09 DIAGNOSIS — E78.5 DYSLIPIDEMIA: ICD-10-CM

## 2021-02-09 DIAGNOSIS — I10 ESSENTIAL HYPERTENSION: ICD-10-CM

## 2021-02-09 LAB
ALBUMIN SERPL BCP-MCNC: 4.1 G/DL (ref 3.2–4.9)
ALBUMIN SERPL BCP-MCNC: 4.2 G/DL (ref 3.2–4.9)
ALBUMIN/GLOB SERPL: 1.4 G/DL
ALBUMIN/GLOB SERPL: 1.5 G/DL
ALP SERPL-CCNC: 141 U/L (ref 30–99)
ALP SERPL-CCNC: 142 U/L (ref 30–99)
ALT SERPL-CCNC: 45 U/L (ref 2–50)
ALT SERPL-CCNC: 47 U/L (ref 2–50)
ANION GAP SERPL CALC-SCNC: 10 MMOL/L (ref 7–16)
ANION GAP SERPL CALC-SCNC: 11 MMOL/L (ref 7–16)
ANION GAP SERPL CALC-SCNC: 11 MMOL/L (ref 7–16)
AST SERPL-CCNC: 37 U/L (ref 12–45)
AST SERPL-CCNC: 41 U/L (ref 12–45)
BASOPHILS # BLD AUTO: 1.8 % (ref 0–1.8)
BASOPHILS # BLD: 0.11 K/UL (ref 0–0.12)
BILIRUB SERPL-MCNC: 0.4 MG/DL (ref 0.1–1.5)
BILIRUB SERPL-MCNC: 0.4 MG/DL (ref 0.1–1.5)
BUN SERPL-MCNC: 25 MG/DL (ref 8–22)
CALCIUM SERPL-MCNC: 10.1 MG/DL (ref 8.5–10.5)
CHLORIDE SERPL-SCNC: 104 MMOL/L (ref 96–112)
CHLORIDE SERPL-SCNC: 105 MMOL/L (ref 96–112)
CHLORIDE SERPL-SCNC: 105 MMOL/L (ref 96–112)
CHOLEST SERPL-MCNC: 132 MG/DL (ref 100–199)
CO2 SERPL-SCNC: 24 MMOL/L (ref 20–33)
CO2 SERPL-SCNC: 26 MMOL/L (ref 20–33)
CO2 SERPL-SCNC: 26 MMOL/L (ref 20–33)
CREAT SERPL-MCNC: 1.21 MG/DL (ref 0.5–1.4)
CREAT SERPL-MCNC: 1.24 MG/DL (ref 0.5–1.4)
CREAT SERPL-MCNC: 1.25 MG/DL (ref 0.5–1.4)
EOSINOPHIL # BLD AUTO: 0.23 K/UL (ref 0–0.51)
EOSINOPHIL NFR BLD: 3.7 % (ref 0–6.9)
ERYTHROCYTE [DISTWIDTH] IN BLOOD BY AUTOMATED COUNT: 46.8 FL (ref 35.9–50)
FASTING STATUS PATIENT QL REPORTED: NORMAL
GLOBULIN SER CALC-MCNC: 2.8 G/DL (ref 1.9–3.5)
GLOBULIN SER CALC-MCNC: 2.9 G/DL (ref 1.9–3.5)
GLUCOSE SERPL-MCNC: 133 MG/DL (ref 65–99)
GLUCOSE SERPL-MCNC: 71 MG/DL (ref 65–99)
GLUCOSE SERPL-MCNC: 71 MG/DL (ref 65–99)
HCT VFR BLD AUTO: 39.5 % (ref 37–47)
HDLC SERPL-MCNC: 41 MG/DL
HGB BLD-MCNC: 12.7 G/DL (ref 12–16)
IMM GRANULOCYTES # BLD AUTO: 0.02 K/UL (ref 0–0.11)
IMM GRANULOCYTES NFR BLD AUTO: 0.3 % (ref 0–0.9)
LDLC SERPL CALC-MCNC: 69 MG/DL
LYMPHOCYTES # BLD AUTO: 1.43 K/UL (ref 1–4.8)
LYMPHOCYTES NFR BLD: 22.9 % (ref 22–41)
MCH RBC QN AUTO: 30.7 PG (ref 27–33)
MCHC RBC AUTO-ENTMCNC: 32.2 G/DL (ref 33.6–35)
MCV RBC AUTO: 95.4 FL (ref 81.4–97.8)
MONOCYTES # BLD AUTO: 0.55 K/UL (ref 0–0.85)
MONOCYTES NFR BLD AUTO: 8.8 % (ref 0–13.4)
NEUTROPHILS # BLD AUTO: 3.9 K/UL (ref 2–7.15)
NEUTROPHILS NFR BLD: 62.5 % (ref 44–72)
NRBC # BLD AUTO: 0 K/UL
NRBC BLD-RTO: 0 /100 WBC
PLATELET # BLD AUTO: 325 K/UL (ref 164–446)
PMV BLD AUTO: 10.1 FL (ref 9–12.9)
POTASSIUM SERPL-SCNC: 4.7 MMOL/L (ref 3.6–5.5)
POTASSIUM SERPL-SCNC: 4.7 MMOL/L (ref 3.6–5.5)
POTASSIUM SERPL-SCNC: 4.8 MMOL/L (ref 3.6–5.5)
PROT SERPL-MCNC: 7 G/DL (ref 6–8.2)
PROT SERPL-MCNC: 7 G/DL (ref 6–8.2)
RBC # BLD AUTO: 4.14 M/UL (ref 4.2–5.4)
SODIUM SERPL-SCNC: 140 MMOL/L (ref 135–145)
SODIUM SERPL-SCNC: 141 MMOL/L (ref 135–145)
SODIUM SERPL-SCNC: 141 MMOL/L (ref 135–145)
TRIGL SERPL-MCNC: 112 MG/DL (ref 0–149)
WBC # BLD AUTO: 6.2 K/UL (ref 4.8–10.8)

## 2021-02-09 PROCEDURE — 80048 BASIC METABOLIC PNL TOTAL CA: CPT

## 2021-02-09 PROCEDURE — 80053 COMPREHEN METABOLIC PANEL: CPT

## 2021-02-09 PROCEDURE — 80061 LIPID PANEL: CPT

## 2021-02-09 PROCEDURE — 36415 COLL VENOUS BLD VENIPUNCTURE: CPT

## 2021-02-09 PROCEDURE — 85025 COMPLETE CBC W/AUTO DIFF WBC: CPT

## 2021-02-09 PROCEDURE — 80053 COMPREHEN METABOLIC PANEL: CPT | Mod: 91

## 2021-03-02 ENCOUNTER — HOSPITAL ENCOUNTER (EMERGENCY)
Facility: MEDICAL CENTER | Age: 66
End: 2021-03-02
Attending: EMERGENCY MEDICINE
Payer: MEDICARE

## 2021-03-02 VITALS
TEMPERATURE: 98 F | BODY MASS INDEX: 23.1 KG/M2 | DIASTOLIC BLOOD PRESSURE: 62 MMHG | OXYGEN SATURATION: 96 % | SYSTOLIC BLOOD PRESSURE: 132 MMHG | HEIGHT: 66 IN | HEART RATE: 71 BPM | RESPIRATION RATE: 16 BRPM | WEIGHT: 143.74 LBS

## 2021-03-02 DIAGNOSIS — M54.31 SCIATICA OF RIGHT SIDE: ICD-10-CM

## 2021-03-02 DIAGNOSIS — M54.50 LUMBAR BACK PAIN: ICD-10-CM

## 2021-03-02 LAB
ALBUMIN SERPL BCP-MCNC: 3.9 G/DL (ref 3.2–4.9)
ALBUMIN/GLOB SERPL: 1.4 G/DL
ALP SERPL-CCNC: 134 U/L (ref 30–99)
ALT SERPL-CCNC: 37 U/L (ref 2–50)
ANION GAP SERPL CALC-SCNC: 8 MMOL/L (ref 7–16)
AST SERPL-CCNC: 27 U/L (ref 12–45)
BASOPHILS # BLD AUTO: 1.3 % (ref 0–1.8)
BASOPHILS # BLD: 0.09 K/UL (ref 0–0.12)
BILIRUB SERPL-MCNC: 0.3 MG/DL (ref 0.1–1.5)
BUN SERPL-MCNC: 23 MG/DL (ref 8–22)
CALCIUM SERPL-MCNC: 8.8 MG/DL (ref 8.4–10.2)
CHLORIDE SERPL-SCNC: 103 MMOL/L (ref 96–112)
CO2 SERPL-SCNC: 26 MMOL/L (ref 20–33)
CREAT SERPL-MCNC: 1.11 MG/DL (ref 0.5–1.4)
EKG IMPRESSION: NORMAL
EOSINOPHIL # BLD AUTO: 0.24 K/UL (ref 0–0.51)
EOSINOPHIL NFR BLD: 3.4 % (ref 0–6.9)
ERYTHROCYTE [DISTWIDTH] IN BLOOD BY AUTOMATED COUNT: 49 FL (ref 35.9–50)
GLOBULIN SER CALC-MCNC: 2.8 G/DL (ref 1.9–3.5)
GLUCOSE SERPL-MCNC: 282 MG/DL (ref 65–99)
HCT VFR BLD AUTO: 38.8 % (ref 37–47)
HGB BLD-MCNC: 12.5 G/DL (ref 12–16)
IMM GRANULOCYTES # BLD AUTO: 0.03 K/UL (ref 0–0.11)
IMM GRANULOCYTES NFR BLD AUTO: 0.4 % (ref 0–0.9)
LYMPHOCYTES # BLD AUTO: 1.45 K/UL (ref 1–4.8)
LYMPHOCYTES NFR BLD: 20.5 % (ref 22–41)
MCH RBC QN AUTO: 31.2 PG (ref 27–33)
MCHC RBC AUTO-ENTMCNC: 32.2 G/DL (ref 33.6–35)
MCV RBC AUTO: 96.8 FL (ref 81.4–97.8)
MONOCYTES # BLD AUTO: 0.69 K/UL (ref 0–0.85)
MONOCYTES NFR BLD AUTO: 9.7 % (ref 0–13.4)
NEUTROPHILS # BLD AUTO: 4.59 K/UL (ref 2–7.15)
NEUTROPHILS NFR BLD: 64.7 % (ref 44–72)
NRBC # BLD AUTO: 0 K/UL
NRBC BLD-RTO: 0 /100 WBC
PLATELET # BLD AUTO: 282 K/UL (ref 164–446)
PMV BLD AUTO: 9.6 FL (ref 9–12.9)
POTASSIUM SERPL-SCNC: 4.4 MMOL/L (ref 3.6–5.5)
PROT SERPL-MCNC: 6.7 G/DL (ref 6–8.2)
RBC # BLD AUTO: 4.01 M/UL (ref 4.2–5.4)
SODIUM SERPL-SCNC: 137 MMOL/L (ref 135–145)
WBC # BLD AUTO: 7.1 K/UL (ref 4.8–10.8)

## 2021-03-02 PROCEDURE — 93005 ELECTROCARDIOGRAM TRACING: CPT

## 2021-03-02 PROCEDURE — 85025 COMPLETE CBC W/AUTO DIFF WBC: CPT

## 2021-03-02 PROCEDURE — 93005 ELECTROCARDIOGRAM TRACING: CPT | Performed by: EMERGENCY MEDICINE

## 2021-03-02 PROCEDURE — 700111 HCHG RX REV CODE 636 W/ 250 OVERRIDE (IP): Performed by: EMERGENCY MEDICINE

## 2021-03-02 PROCEDURE — 96374 THER/PROPH/DIAG INJ IV PUSH: CPT

## 2021-03-02 PROCEDURE — 96375 TX/PRO/DX INJ NEW DRUG ADDON: CPT

## 2021-03-02 PROCEDURE — 700105 HCHG RX REV CODE 258: Performed by: EMERGENCY MEDICINE

## 2021-03-02 PROCEDURE — A9270 NON-COVERED ITEM OR SERVICE: HCPCS | Performed by: EMERGENCY MEDICINE

## 2021-03-02 PROCEDURE — 700101 HCHG RX REV CODE 250: Performed by: EMERGENCY MEDICINE

## 2021-03-02 PROCEDURE — 99285 EMERGENCY DEPT VISIT HI MDM: CPT

## 2021-03-02 PROCEDURE — 80053 COMPREHEN METABOLIC PANEL: CPT

## 2021-03-02 PROCEDURE — 700102 HCHG RX REV CODE 250 W/ 637 OVERRIDE(OP): Performed by: EMERGENCY MEDICINE

## 2021-03-02 RX ORDER — GABAPENTIN 100 MG/1
100 CAPSULE ORAL ONCE
Status: COMPLETED | OUTPATIENT
Start: 2021-03-02 | End: 2021-03-02

## 2021-03-02 RX ORDER — LIDOCAINE 50 MG/G
1 PATCH TOPICAL EVERY 24 HOURS
Qty: 15 PATCH | Refills: 1 | Status: ON HOLD | OUTPATIENT
Start: 2021-03-02 | End: 2021-09-26

## 2021-03-02 RX ORDER — LIDOCAINE 50 MG/G
1 PATCH TOPICAL EVERY 24 HOURS
Status: DISCONTINUED | OUTPATIENT
Start: 2021-03-02 | End: 2021-03-02 | Stop reason: HOSPADM

## 2021-03-02 RX ORDER — DEXAMETHASONE SODIUM PHOSPHATE 4 MG/ML
6 INJECTION, SOLUTION INTRA-ARTICULAR; INTRALESIONAL; INTRAMUSCULAR; INTRAVENOUS; SOFT TISSUE ONCE
Status: COMPLETED | OUTPATIENT
Start: 2021-03-02 | End: 2021-03-02

## 2021-03-02 RX ORDER — METHYLPREDNISOLONE 4 MG/1
TABLET ORAL
Qty: 1 EACH | Refills: 0 | Status: SHIPPED | OUTPATIENT
Start: 2021-03-02 | End: 2021-08-03

## 2021-03-02 RX ORDER — MIDAZOLAM HYDROCHLORIDE 1 MG/ML
0.5 INJECTION INTRAMUSCULAR; INTRAVENOUS ONCE
Status: COMPLETED | OUTPATIENT
Start: 2021-03-02 | End: 2021-03-02

## 2021-03-02 RX ORDER — KETOROLAC TROMETHAMINE 30 MG/ML
30 INJECTION, SOLUTION INTRAMUSCULAR; INTRAVENOUS ONCE
Status: COMPLETED | OUTPATIENT
Start: 2021-03-02 | End: 2021-03-02

## 2021-03-02 RX ORDER — LOSARTAN POTASSIUM 100 MG/1
50 TABLET ORAL DAILY
Status: SHIPPED | COMMUNITY
End: 2021-05-19

## 2021-03-02 RX ADMIN — LIDOCAINE 1 PATCH: 50 PATCH TOPICAL at 17:47

## 2021-03-02 RX ADMIN — MIDAZOLAM HYDROCHLORIDE 0.5 MG: 1 INJECTION, SOLUTION INTRAMUSCULAR; INTRAVENOUS at 16:02

## 2021-03-02 RX ADMIN — KETOROLAC TROMETHAMINE 30 MG: 30 INJECTION, SOLUTION INTRAMUSCULAR at 15:59

## 2021-03-02 RX ADMIN — GABAPENTIN 100 MG: 100 CAPSULE ORAL at 17:47

## 2021-03-02 RX ADMIN — DEXAMETHASONE SODIUM PHOSPHATE 6 MG: 4 INJECTION, SOLUTION INTRA-ARTICULAR; INTRALESIONAL; INTRAMUSCULAR; INTRAVENOUS; SOFT TISSUE at 16:00

## 2021-03-02 RX ADMIN — KETAMINE HYDROCHLORIDE 25 MG: 10 INJECTION, SOLUTION INTRAMUSCULAR; INTRAVENOUS at 16:02

## 2021-03-02 ASSESSMENT — FIBROSIS 4 INDEX: FIB4 SCORE: 1.07

## 2021-03-02 NOTE — ED TRIAGE NOTES
Chief Complaint   Patient presents with   • Back Pain     across lower back and right upper      pt complains of lower back pain that radiates to her upper right side. Pt also complains of weakness.

## 2021-03-02 NOTE — ED NOTES
Pt back to room in wheelchair with daughter  Pt assisted into [facility]

## 2021-03-02 NOTE — ED NOTES
Med rec updated and complete  Allergies reviewed  Interviewed pt with family at bedside with permission from pt.  Pt had a list of medications at bedside, went over list of medications and returned list of medications back to pt at bedside.  Pt reports no antibiotics in the last 2 weeks

## 2021-03-03 NOTE — ED NOTES
Pt now smiling, reports pain significantly decreased. Patient verbalized understanding of discharge instructions including not to drive for 8 hrs following narcotic medication, no questions at this time. VS stable, patient will ambulate to exit with d/c instructions and rx in hand.

## 2021-03-03 NOTE — ED NOTES
"When RN entered room pt asked \" Is the pain supposed to come back?\" . Pt reports pain was 10/10 when she arrived, currently places it at 6/10. Pt educated that the goal was to reducer her pain to a tolerable level but we are often unable to bring her pain to 0. ERP informed of pt's current pain level.   "

## 2021-03-04 ENCOUNTER — HOSPITAL ENCOUNTER (OUTPATIENT)
Dept: LAB | Facility: MEDICAL CENTER | Age: 66
End: 2021-03-04
Attending: INTERNAL MEDICINE
Payer: MEDICARE

## 2021-03-04 LAB
APPEARANCE UR: CLEAR
BACTERIA #/AREA URNS HPF: NEGATIVE /HPF
BILIRUB UR QL STRIP.AUTO: NEGATIVE
COLOR UR: YELLOW
EPI CELLS #/AREA URNS HPF: NEGATIVE /HPF
EST. AVERAGE GLUCOSE BLD GHB EST-MCNC: 266 MG/DL
GLUCOSE UR STRIP.AUTO-MCNC: >=1000 MG/DL
HBA1C MFR BLD: 10.9 % (ref 4–5.6)
HYALINE CASTS #/AREA URNS LPF: NORMAL /LPF
KETONES UR STRIP.AUTO-MCNC: NEGATIVE MG/DL
LEUKOCYTE ESTERASE UR QL STRIP.AUTO: NEGATIVE
MICRO URNS: ABNORMAL
NITRITE UR QL STRIP.AUTO: NEGATIVE
PH UR STRIP.AUTO: 5.5 [PH] (ref 5–8)
PROT UR QL STRIP: 30 MG/DL
RBC # URNS HPF: NORMAL /HPF
RBC UR QL AUTO: NEGATIVE
SP GR UR STRIP.AUTO: 1.02
T3FREE SERPL-MCNC: 1.87 PG/ML (ref 2–4.4)
TSH SERPL DL<=0.005 MIU/L-ACNC: 14.8 UIU/ML (ref 0.38–5.33)
UROBILINOGEN UR STRIP.AUTO-MCNC: 0.2 MG/DL
WBC #/AREA URNS HPF: NORMAL /HPF

## 2021-03-04 PROCEDURE — 84207 ASSAY OF VITAMIN B-6: CPT

## 2021-03-04 PROCEDURE — 83036 HEMOGLOBIN GLYCOSYLATED A1C: CPT

## 2021-03-04 PROCEDURE — 84481 FREE ASSAY (FT-3): CPT

## 2021-03-04 PROCEDURE — 82652 VIT D 1 25-DIHYDROXY: CPT

## 2021-03-04 PROCEDURE — 84080 ASSAY ALKALINE PHOSPHATASES: CPT

## 2021-03-04 PROCEDURE — 36415 COLL VENOUS BLD VENIPUNCTURE: CPT

## 2021-03-04 PROCEDURE — 84439 ASSAY OF FREE THYROXINE: CPT

## 2021-03-04 PROCEDURE — 81001 URINALYSIS AUTO W/SCOPE: CPT

## 2021-03-04 PROCEDURE — 84075 ASSAY ALKALINE PHOSPHATASE: CPT

## 2021-03-04 PROCEDURE — 84443 ASSAY THYROID STIM HORMONE: CPT

## 2021-03-06 LAB — 1,25(OH)2D3 SERPL-MCNC: 34.1 PG/ML (ref 19.9–79.3)

## 2021-03-07 LAB
ALP BONE SERPL-CCNC: 41 U/L (ref 0–55)
ALP ISOS SERPL HS-CCNC: 0 U/L
ALP LIVER SERPL-CCNC: 104 U/L (ref 0–94)
ALP SERPL-CCNC: 145 U/L (ref 40–120)

## 2021-03-07 NOTE — ED PROVIDER NOTES
"ED Provider Note    CHIEF COMPLAINT  Chief Complaint   Patient presents with   • Back Pain     across lower back and right upper       HPI  Anu Manuel is a 65 y.o. female who presents with lower back pain noted as severe and radiating across the lower back and right upper back as well. No F/C/S or trauma. No recent ABX. Has had similar chronic back pain for years but now worsening again over past few days and chronic pain regimen not effective with mostly OTC tx.No bowel/bladder issues or numbness/paresthesias    REVIEW OF SYSTEMS  See HPI for further details. All other systems are negative.     PAST MEDICAL HISTORY  Past Medical History:   Diagnosis Date   • Adverse effect of anesthesia     in 2008 \"throat closes up\"\"anxiety\" ?laryngospasm, kept in ICU. Pt states no problems currently 2018.    • Anesthesia     in 2008 \"throat closes up\"\"anxiety\" ?laryngospasm, kept in ICU. Pt states no problems currently 2018.    • Arthritis     right shoulder, hands   • Cigarette smoker quit 2013   • Dental disorder     missing teeth    • depression 8/30/2016    denies depression, states has anxiety and panic attacks   • Diabetes mellitus type 1 (HCC) 1989    insulin   • Encounter for long-term (current) use of insulin ([facility]) 9/25/2013   • Heart burn    • High cholesterol    • Hypertension    • Hypothyroidism, postsurgical 1970   • Indigestion    • Infectious disease      had hepatitis C, tested neg.   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.63) 6/10/2015       FAMILY HISTORY  Family History   Problem Relation Age of Onset   • Hypertension Mother    • Cancer Father        SOCIAL HISTORY   reports that she has been smoking cigarettes. She has a 30.00 pack-year smoking history. She has never used " smokeless tobacco. She reports that she does not drink alcohol and does not use drugs.    SURGICAL HISTORY  Past Surgical History:   Procedure Laterality Date   • CATH PLACEMENT  1/25/2020    Procedure: INSERTION, CATHETER PERM;  Surgeon: Rola Mendoza M.D.;  Location: [facility];  Service: General   • IRRIGATION & DEBRIDEMENT NEURO  1/19/2020    Procedure: IRRIGATION AND DEBRIDEMENT, WOUND THORACIC AND LUMBAR;  Surgeon: Ryan Roman M.D.;  Location: [facility];  Service: Neurosurgery   • PB IMPLANT NEUROSTIM/ N/A 12/16/2019    Procedure: EXPLORATION AT THORACIC 8 - 9, REPLACEMENT OF  NEUROSTIMULATOR LEAD;  Surgeon: Ryan Roman M.D.;  Location: [facility];  Service: Neurosurgery   • SPINAL CORD STIMULATOR N/A 10/26/2018    Procedure: SPINAL CORD STIMULATOR;  Surgeon: Ryan Roman M.D.;  Location: [facility];  Service: Neurosurgery   • THORACIC LAMINECTOMY N/A 10/26/2018    Procedure: THORACIC LAMINECTOMY - FOR;  Surgeon: Ryan Roman M.D.;  Location: [facility];  Service: Neurosurgery   • UT NJX AA&/STRD TFRML EPI LUMBAR/SACRAL 1 LEVEL Right 8/31/2016    Procedure: INJ-FORAMEN EPI LUM/SAC SNGL L4-5;  Surgeon: Sukhi Godfrey M.D.;  Location: [facility];  Service: Pain Management   • LUMBAR LAMINECTOMY DISKECTOMY Right 5/10/2016    Procedure: LUMBAR L4-5 HEMILAMINECTOMY DISKECTOMY ;  Surgeon: Arnold Keyes M.D.;  Location: [facility];  Service:    • CERVICAL FUSION POSTERIOR  1/16/2009    Performed by TARA CONTRERAS at [facility]   • HARDWARE REMOVAL NEURO  1/16/2009    Performed by TARA CONTRERAS at [facility]   • NECK EXPLORATION  1/16/2009    Performed by TARA CONTRERAS at [facility]   • SHOULDER ARTHROSCOPY W/ ROTATOR CUFF REPAIR  10/9/08    Performed by SHERLY CASTANEDA at [facility]   • SHOULDER DECOMPRESSION ARTHROSCOPIC  6/17/08     "Performed by SHERLY CASTANEDA at SURGERY [facility]   • CLAVICLE DISTAL EXCISION  6/17/08    Performed by SHERLY CASTANEDA at [facility]   • CERVICAL DISK AND FUSION ANTERIOR  03/12/08   • HYSTERECTOMY, VAGINAL  2006   • APPENDECTOMY  2004   • THYROID LOBECTOMY  1973   • TONSILLECTOMY  1963   • ABDOMINAL HYSTERECTOMY TOTAL     • LUMPECTOMY  1976, 2005    Breast    • LUMPECTOMY         CURRENT MEDICATIONS  Home Medications     Reviewed by Stone Brady (Pharmacy Tech) on 03/02/21 at 1556  Med List Status: Complete   Medication Last Dose Status   amLODIPine (NORVASC) 10 MG Tab 3/1/2021 Active   aspirin 81 MG EC tablet 3/1/2021 Active   atorvastatin (LIPITOR) 40 MG Tab 3/1/2021 Active   Biotin 5000 MCG Tab 3/1/2021 Active   Cholecalciferol (VITAMIN D3 PO) 3/1/2021 Active   clopidogrel (PLAVIX) 75 MG Tab 3/1/2021 Active   Continuous Blood Gluc Sensor (ViadeoSTYLE PARISA 14 DAY SENSOR) Misc  Active   insulin aspart (NOVOLOG) 100 UNIT/ML Solution 3/1/2021 Active   Insulin Degludec (TRESIBA) 100 UNIT/ML Solution 3/1/2021 Active   levothyroxine (SYNTHROID) 175 MCG Tab 3/1/2021 Active   lisinopril-hydrochlorothiazide (PRINZIDE) 20-25 MG per tablet 3/1/2021 Active   losartan (COZAAR) 100 MG Tab 3/1/2021 Active   mesalamine (LIALDA) 1.2 GM Tablet Delayed Response 3/1/2021 Active   metoprolol (LOPRESSOR) 100 MG Tab 3/1/2021 Active   omeprazole (PRILOSEC) 40 MG delayed-release capsule 3/1/2021 Active   ondansetron (ZOFRAN ODT) 4 MG TABLET DISPERSIBLE Not Taking Active   Pyridoxine HCl (VITAMIN B6 PO) 3/1/2021 Active   traZODone (DESYREL) 100 MG Tab > 4 nights Active                ALLERGIES  Allergies   Allergen Reactions   • Ativan Unspecified      Extreme Restlessness with whole body  LEJ=[id number]   • Tape      Blisters, paper tape is ok       PHYSICAL EXAM  VITAL SIGNS: /62   Pulse 71   Temp 36.7 °C (98 °F) (Temporal)   Resp 16   Ht 1.676 m (5' 6\")   Wt 65.2 kg (143 lb 11.8 oz)   LMP 04/29/2005 (LMP " Unknown)   SpO2 96%   BMI 23.20 kg/m²    Constitutional: Well developed, Well nourished, No acute distress, Non-toxic appearance.   HENT: Normocephalic, Atraumatic, Bilateral external ears normal, Oropharynx is clear mucous membranes are moist. No oral exudates or nasal discharge.   Eyes: Pupils are equal round and reactive, EOMI, Conjunctiva normal, No discharge.   Neck: Normal range of motion, No tenderness, Supple, No stridor. No meningismus.  Lymphatic: No lymphadenopathy noted.   Cardiovascular: Regular rate and rhythm without murmur rub or gallop.  Thorax & Lungs: Clear breath sounds bilaterally without wheezes, rhonchi or rales. There is no chest wall tenderness.   Abdomen: Soft non-tender non-distended. There is no rebound or guarding. No organomegaly is appreciated. Bowel sounds are normal.  Skin: Normal without rash.   Back: R lower para- spinal tenderness. No significant spasm  Extremities: Intact distal pulses, No edema, No tenderness, No cyanosis, No clubbing. Capillary refill is less than 2 seconds.  Musculoskeletal: Good range of motion in all major joints. No tenderness to palpation or major deformities noted.   Neurologic: Alert & oriented x 3, Normal motor function, Normal sensory function, No focal deficits noted. Reflexes are normal. Pos strait leg raise on R, neg on L  Psychiatric: Affect normal, Judgment normal, Mood normal. There is no suicidal ideation or patient reported hallucinations.       COURSE & MEDICAL DECISION MAKING  Pertinent Labs & Imaging studies reviewed. (See chart for details)  Patient has neuropathic pain that is acute on chronic but not effectively managed at home with OTCs.  Discussed R/B of Ketamine tx and then given along with decadron, toradol, neurontin.  Patient slowly improved and showed functional ability after est 3 hours in ED.   Discharged on neurontin and lidoderm patches with F/U to PCP  Patient and family comfortable with plan of care.    FINAL IMPRESSION  1.  Lumbar back pain    2. Sciatica of right side             Electronically signed by: Serge Gonzalez M.D., 3/7/2021 2:08 PM

## 2021-03-08 LAB — VIT B6 SERPL-MCNC: 177.8 NMOL/L (ref 20–125)

## 2021-03-09 LAB — T4 FREE SERPL DIALY-MCNC: 1.7 NG/DL (ref 1.1–2.4)

## 2021-03-15 DIAGNOSIS — Z23 NEED FOR VACCINATION: ICD-10-CM

## 2021-03-24 ENCOUNTER — TELEPHONE (OUTPATIENT)
Dept: CARDIOLOGY | Facility: MEDICAL CENTER | Age: 66
End: 2021-03-24

## 2021-03-24 NOTE — TELEPHONE ENCOUNTER
Received clearance request from Dr. Manuel Stevens DDS, for pt's oral surgery. Procedure details not mentioned. Called their office at [phone] and s/w Padmini. Asked what procedure pt is having done, and if she needs to hold Plavix prior. Padmini transferred me to Priscilla, who transferred me to the surgical VM. M explaining that pt should continue Plavix for a year post stent placement, which would be until 08/2021. Faxed clearance back to them at [phone] explaining this. Encouraged them to call or send another clearance request if pt can proceed with her oral surgery while still taking Plavix.

## 2021-04-01 RX ORDER — CLOPIDOGREL BISULFATE 75 MG/1
75 TABLET ORAL DAILY
Qty: 90 TABLET | Refills: 3 | Status: SHIPPED | OUTPATIENT
Start: 2021-04-01 | End: 2021-07-21

## 2021-04-27 ENCOUNTER — PATIENT MESSAGE (OUTPATIENT)
Dept: HEALTH INFORMATION MANAGEMENT | Facility: OTHER | Age: 66
End: 2021-04-27

## 2021-04-28 ENCOUNTER — PATIENT OUTREACH (OUTPATIENT)
Dept: HEALTH INFORMATION MANAGEMENT | Facility: OTHER | Age: 66
End: 2021-04-28

## 2021-04-28 NOTE — PROGRESS NOTES
Outcome: Left Message  Called pt to schedule AHMET, left voicemail msg.     Please transfer to Patient Outreach Team at [phone] when patient returns call.      Attempt # 1

## 2021-05-19 ENCOUNTER — OFFICE VISIT (OUTPATIENT)
Dept: NEPHROLOGY | Facility: MEDICAL CENTER | Age: 66
End: 2021-05-19
Payer: MEDICARE

## 2021-05-19 VITALS
WEIGHT: 139 LBS | RESPIRATION RATE: 16 BRPM | DIASTOLIC BLOOD PRESSURE: 76 MMHG | BODY MASS INDEX: 21.82 KG/M2 | OXYGEN SATURATION: 100 % | HEIGHT: 67 IN | SYSTOLIC BLOOD PRESSURE: 124 MMHG | TEMPERATURE: 97.4 F | HEART RATE: 61 BPM

## 2021-05-19 DIAGNOSIS — I10 ESSENTIAL HYPERTENSION: ICD-10-CM

## 2021-05-19 DIAGNOSIS — N18.31 STAGE 3A CHRONIC KIDNEY DISEASE: ICD-10-CM

## 2021-05-19 PROCEDURE — 99214 OFFICE O/P EST MOD 30 MIN: CPT | Performed by: INTERNAL MEDICINE

## 2021-05-19 RX ORDER — PSEUDOEPHED/ACETAMINOPH/DIPHEN 30MG-500MG
TABLET ORAL
Status: ON HOLD | COMMUNITY
Start: 2021-02-27 | End: 2021-09-26

## 2021-05-19 RX ORDER — FLUOXETINE 10 MG/1
10 CAPSULE ORAL
COMMUNITY
Start: 2021-04-15 | End: 2021-08-03

## 2021-05-19 RX ORDER — CHLORHEXIDINE GLUCONATE ORAL RINSE 1.2 MG/ML
SOLUTION DENTAL
COMMUNITY
Start: 2021-04-08 | End: 2021-08-03

## 2021-05-19 RX ORDER — AMOXICILLIN 500 MG/1
CAPSULE ORAL
COMMUNITY
Start: 2021-04-08 | End: 2021-08-03

## 2021-05-19 RX ORDER — IBUPROFEN 600 MG/1
TABLET ORAL
COMMUNITY
Start: 2021-04-08 | End: 2021-08-03

## 2021-05-19 RX ORDER — HYDROCODONE BITARTRATE AND ACETAMINOPHEN 5; 325 MG/1; MG/1
TABLET ORAL
COMMUNITY
Start: 2021-04-08 | End: 2021-08-03

## 2021-05-19 ASSESSMENT — ENCOUNTER SYMPTOMS
INSOMNIA: 1
CHILLS: 0
VOMITING: 0
FEVER: 0
HYPERTENSION: 1
NAUSEA: 0
COUGH: 0
DEPRESSION: 1
SHORTNESS OF BREATH: 0

## 2021-05-19 ASSESSMENT — FIBROSIS 4 INDEX: FIB4 SCORE: 1.01

## 2021-05-19 NOTE — PROGRESS NOTES
"Subjective:      Anu Manuel is a 66 y.o. female who presents with Chronic Kidney Disease and Hypertension            The patient is complaining of generalized fatigue, lightheadedness, blood pressure is on the low side.  Patient is grieving, her  passed away few months ago, she is still struggling with the idea, she is mildly depressed, she has decreased oral intake and lost some weight.    Chronic Kidney Disease  This is a chronic problem. The current episode started more than 1 year ago. The problem occurs constantly. The problem has been unchanged. Pertinent negatives include no chest pain, chills, coughing, fever, nausea, urinary symptoms or vomiting.   Hypertension  This is a chronic problem. The current episode started more than 1 year ago. The problem has been waxing and waning since onset. The problem is controlled. Pertinent negatives include no chest pain, malaise/fatigue, peripheral edema or shortness of breath. Risk factors for coronary artery disease include post-menopausal state and stress. Past treatments include calcium channel blockers and ACE inhibitors. The current treatment provides significant improvement. There are no compliance problems.        Review of Systems   Constitutional: Negative for chills, fever and malaise/fatigue.   Respiratory: Negative for cough and shortness of breath.    Cardiovascular: Negative for chest pain and leg swelling.   Gastrointestinal: Negative for nausea and vomiting.   Genitourinary: Negative for dysuria, frequency and urgency.   Psychiatric/Behavioral: Positive for depression. The patient has insomnia.           Objective:     /76 (BP Location: Right arm, Patient Position: Sitting)   Pulse 61   Temp 36.3 °C (97.4 °F) (Temporal)   Resp 16   Ht 1.689 m (5' 6.5\")   Wt 63 kg (139 lb)   LMP 04/29/2005 (LMP Unknown)   SpO2 100%   BMI 22.10 kg/m²      Physical Exam  Vitals and nursing note reviewed.   Constitutional:       General: She " "is not in acute distress.     Appearance: Normal appearance. She is well-developed. She is not diaphoretic.   HENT:      Head: Normocephalic and atraumatic.      Right Ear: External ear normal.      Left Ear: External ear normal.      Nose: Nose normal.   Eyes:      General: No scleral icterus.        Right eye: No discharge.         Left eye: No discharge.      Conjunctiva/sclera: Conjunctivae normal.   Cardiovascular:      Rate and Rhythm: Normal rate and regular rhythm.      Heart sounds: No murmur heard.     Pulmonary:      Effort: Pulmonary effort is normal. No respiratory distress.      Breath sounds: Normal breath sounds.   Musculoskeletal:         General: No tenderness.      Right lower leg: No edema.      Left lower leg: No edema.   Skin:     General: Skin is warm and dry.      Findings: No erythema.   Neurological:      General: No focal deficit present.      Mental Status: She is alert and oriented to person, place, and time.      Cranial Nerves: No cranial nerve deficit.   Psychiatric:         Mood and Affect: Mood normal.         Behavior: Behavior normal.       Past Medical History:   Diagnosis Date   • Adverse effect of anesthesia     in 2008 \"throat closes up\"\"anxiety\" ?laryngospasm, kept in ICU. Pt states no problems currently 2018.    • Anesthesia     in 2008 \"throat closes up\"\"anxiety\" ?laryngospasm, kept in ICU. Pt states no problems currently 2018.    • Arthritis     right shoulder, hands   • Cigarette smoker quit 2013   • Dental disorder     missing teeth    • depression 8/30/2016    denies depression, states has anxiety and panic attacks   • Diabetes mellitus type 1 (HCC) 1989    insulin   • Encounter for long-term (current) use of insulin ([facility]) 9/25/2013   • Heart burn    • High cholesterol    • Hypertension    • Hypothyroidism, postsurgical 1970   • Indigestion    • Infectious disease      had hepatitis C, tested neg.   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) 6/10/2015     Social History     Socioeconomic History   • Marital status:      Spouse name: Not on file   • Number of children: Not on file   • Years of education: Not on file   • Highest education level: Not on file   Occupational History   • Not on file   Tobacco Use   • Smoking status: Current Every Day Smoker     Packs/day: 1.00     Years: 30.00     Pack years: 30.00     Types: Cigarettes   • Smokeless tobacco: Never Used   • Tobacco comment: 1 ppd    Vaping Use   • Vaping Use: Never used   Substance and Sexual Activity   • Alcohol use: No     Comment:     • Drug use: No   • Sexual activity: Not on file   Other Topics Concern   • Not on file   Social History Narrative   • Not on file     Social Determinants of Health     Financial Resource Strain: Low Risk    • Difficulty of Paying Living Expenses: Not hard at all   Food Insecurity: No Food Insecurity   • Worried About Running Out of Food in the Last Year: Never true   • Ran Out of Food in the Last Year: Never true   Transportation Needs: Unmet Transportation Needs   • Lack of Transportation (Medical): Yes   • Lack of Transportation (Non-Medical): Yes   Physical Activity: Inactive   • Days of Exercise per Week: 0 days   • Minutes of Exercise per Session: 0 min   Stress: No Stress Concern Present   • Feeling of Stress : Not at all   Social Connections: Moderately Isolated   • Frequency of Communication with Friends and Family: More than three times a week   • Frequency of Social Gatherings with Friends and Family: More than three times a week   • Attends Jainism Services: Never   • Active Member of Clubs or Organizations: No   • Attends Club or Organization Meetings: Never   • Marital Status:    Intimate Partner Violence: Not At Risk "   • Fear of Current or Ex-Partner: No   • Emotionally Abused: No   • Physically Abused: No   • Sexually Abused: No     Family History   Problem Relation Age of Onset   • Hypertension Mother    • Cancer Father      Recent Labs     08/10/20  0503 08/11/20  0845 08/19/20  0652 09/15/20  1326 02/09/21  1351 02/09/21  1352 03/02/21  1545   ALBUMIN 3.3   < >  --    < > 4.2 4.1 3.9   HDL 43  --  45  --  41  --   --    TRIGLYCERIDE 80  --  100  --  112  --   --    SODIUM 139   < >  --    < > 140 141  141 137   POTASSIUM 4.2   < >  --    < > 4.7 4.7  4.8 4.4   CHLORIDE 109   < >  --    < > 105 105  104 103   CO2 18*   < >  --    < > 24 26  26 26   BUN 22   < >  --    < > 25* 25*  25* 23*   CREATININE 1.00   < >  --    < > 1.25 1.21  1.24 1.11    < > = values in this interval not displayed.                      Assessment/Plan:        1. Stage 3a chronic kidney disease (HCC)  Stable  No uremic symptoms  Renal dose of medication  Avoid nephrotoxins  Continue same medication regimen  Recheck labs    2. Essential hypertension  Blood pressure is on the low side  I advised the patient to discontinue amlodipine  Check blood pressure at home regularly and if she still having hypotensive symptoms we will discontinue lisinopril    3.  Depression  I advised the patient to talk to a professional regarding her depression

## 2021-05-27 ENCOUNTER — HOSPITAL ENCOUNTER (OUTPATIENT)
Dept: LAB | Facility: MEDICAL CENTER | Age: 66
End: 2021-05-27
Attending: INTERNAL MEDICINE
Payer: MEDICARE

## 2021-05-27 ENCOUNTER — HOSPITAL ENCOUNTER (OUTPATIENT)
Dept: LAB | Facility: MEDICAL CENTER | Age: 66
End: 2021-05-27
Attending: NURSE PRACTITIONER
Payer: MEDICARE

## 2021-05-27 DIAGNOSIS — I10 ESSENTIAL HYPERTENSION: ICD-10-CM

## 2021-05-27 DIAGNOSIS — N18.31 STAGE 3A CHRONIC KIDNEY DISEASE: ICD-10-CM

## 2021-05-27 LAB
ALBUMIN SERPL BCP-MCNC: 3.9 G/DL (ref 3.2–4.9)
ALBUMIN/GLOB SERPL: 1.3 G/DL
ALP SERPL-CCNC: 94 U/L (ref 30–99)
ALT SERPL-CCNC: 33 U/L (ref 2–50)
ANION GAP SERPL CALC-SCNC: 14 MMOL/L (ref 7–16)
ANION GAP SERPL CALC-SCNC: 15 MMOL/L (ref 7–16)
APPEARANCE UR: ABNORMAL
AST SERPL-CCNC: 30 U/L (ref 12–45)
BACTERIA #/AREA URNS HPF: ABNORMAL /HPF
BASOPHILS # BLD AUTO: 1.9 % (ref 0–1.8)
BASOPHILS # BLD: 0.12 K/UL (ref 0–0.12)
BILIRUB SERPL-MCNC: 0.3 MG/DL (ref 0.1–1.5)
BILIRUB UR QL STRIP.AUTO: ABNORMAL
BUN SERPL-MCNC: 21 MG/DL (ref 8–22)
BUN SERPL-MCNC: 21 MG/DL (ref 8–22)
CALCIUM SERPL-MCNC: 8.9 MG/DL (ref 8.5–10.5)
CALCIUM SERPL-MCNC: 9 MG/DL (ref 8.5–10.5)
CHLORIDE SERPL-SCNC: 106 MMOL/L (ref 96–112)
CHLORIDE SERPL-SCNC: 107 MMOL/L (ref 96–112)
CO2 SERPL-SCNC: 22 MMOL/L (ref 20–33)
CO2 SERPL-SCNC: 22 MMOL/L (ref 20–33)
COLOR UR: ABNORMAL
CREAT SERPL-MCNC: 1.12 MG/DL (ref 0.5–1.4)
CREAT SERPL-MCNC: 1.17 MG/DL (ref 0.5–1.4)
CREAT UR-MCNC: 273.28 MG/DL
CREAT UR-MCNC: 276.7 MG/DL
EOSINOPHIL # BLD AUTO: 0.24 K/UL (ref 0–0.51)
EOSINOPHIL NFR BLD: 3.8 % (ref 0–6.9)
EPI CELLS #/AREA URNS HPF: ABNORMAL /HPF
ERYTHROCYTE [DISTWIDTH] IN BLOOD BY AUTOMATED COUNT: 45.3 FL (ref 35.9–50)
ERYTHROCYTE [DISTWIDTH] IN BLOOD BY AUTOMATED COUNT: 45.6 FL (ref 35.9–50)
EST. AVERAGE GLUCOSE BLD GHB EST-MCNC: 240 MG/DL
GLOBULIN SER CALC-MCNC: 2.9 G/DL (ref 1.9–3.5)
GLUCOSE SERPL-MCNC: 69 MG/DL (ref 65–99)
GLUCOSE SERPL-MCNC: 70 MG/DL (ref 65–99)
GLUCOSE UR STRIP.AUTO-MCNC: NEGATIVE MG/DL
HBA1C MFR BLD: 10 % (ref 4–5.6)
HCT VFR BLD AUTO: 39.3 % (ref 37–47)
HCT VFR BLD AUTO: 39.4 % (ref 37–47)
HGB BLD-MCNC: 12.5 G/DL (ref 12–16)
HGB BLD-MCNC: 12.6 G/DL (ref 12–16)
HYALINE CASTS #/AREA URNS LPF: ABNORMAL /LPF
IMM GRANULOCYTES # BLD AUTO: 0.02 K/UL (ref 0–0.11)
IMM GRANULOCYTES NFR BLD AUTO: 0.3 % (ref 0–0.9)
KETONES UR STRIP.AUTO-MCNC: ABNORMAL MG/DL
LEUKOCYTE ESTERASE UR QL STRIP.AUTO: ABNORMAL
LYMPHOCYTES # BLD AUTO: 1.38 K/UL (ref 1–4.8)
LYMPHOCYTES NFR BLD: 21.6 % (ref 22–41)
MCH RBC QN AUTO: 31.3 PG (ref 27–33)
MCH RBC QN AUTO: 31.4 PG (ref 27–33)
MCHC RBC AUTO-ENTMCNC: 31.8 G/DL (ref 33.6–35)
MCHC RBC AUTO-ENTMCNC: 32 G/DL (ref 33.6–35)
MCV RBC AUTO: 98.3 FL (ref 81.4–97.8)
MCV RBC AUTO: 98.3 FL (ref 81.4–97.8)
MICRO URNS: ABNORMAL
MICROALBUMIN UR-MCNC: 5.7 MG/DL
MICROALBUMIN UR-MCNC: 6.4 MG/DL
MICROALBUMIN/CREAT UR: 21 MG/G (ref 0–30)
MICROALBUMIN/CREAT UR: 23 MG/G (ref 0–30)
MONOCYTES # BLD AUTO: 0.55 K/UL (ref 0–0.85)
MONOCYTES NFR BLD AUTO: 8.6 % (ref 0–13.4)
NEUTROPHILS # BLD AUTO: 4.08 K/UL (ref 2–7.15)
NEUTROPHILS NFR BLD: 63.8 % (ref 44–72)
NITRITE UR QL STRIP.AUTO: NEGATIVE
NRBC # BLD AUTO: 0 K/UL
NRBC BLD-RTO: 0 /100 WBC
PH UR STRIP.AUTO: 5.5 [PH] (ref 5–8)
PLATELET # BLD AUTO: 289 K/UL (ref 164–446)
PLATELET # BLD AUTO: 295 K/UL (ref 164–446)
PMV BLD AUTO: 10.3 FL (ref 9–12.9)
PMV BLD AUTO: 10.4 FL (ref 9–12.9)
POTASSIUM SERPL-SCNC: 4 MMOL/L (ref 3.6–5.5)
POTASSIUM SERPL-SCNC: 4 MMOL/L (ref 3.6–5.5)
PROT SERPL-MCNC: 6.8 G/DL (ref 6–8.2)
PROT UR QL STRIP: 30 MG/DL
RBC # BLD AUTO: 4 M/UL (ref 4.2–5.4)
RBC # BLD AUTO: 4.01 M/UL (ref 4.2–5.4)
RBC # URNS HPF: ABNORMAL /HPF
RBC UR QL AUTO: NEGATIVE
SODIUM SERPL-SCNC: 143 MMOL/L (ref 135–145)
SODIUM SERPL-SCNC: 143 MMOL/L (ref 135–145)
SP GR UR STRIP.AUTO: 1.03
T3FREE SERPL-MCNC: 1.93 PG/ML (ref 2–4.4)
TSH SERPL DL<=0.005 MIU/L-ACNC: 8.52 UIU/ML (ref 0.38–5.33)
UROBILINOGEN UR STRIP.AUTO-MCNC: 1 MG/DL
WBC # BLD AUTO: 6.4 K/UL (ref 4.8–10.8)
WBC # BLD AUTO: 6.4 K/UL (ref 4.8–10.8)
WBC #/AREA URNS HPF: ABNORMAL /HPF

## 2021-05-27 PROCEDURE — 82570 ASSAY OF URINE CREATININE: CPT

## 2021-05-27 PROCEDURE — 84481 FREE ASSAY (FT-3): CPT

## 2021-05-27 PROCEDURE — 80048 BASIC METABOLIC PNL TOTAL CA: CPT

## 2021-05-27 PROCEDURE — 83036 HEMOGLOBIN GLYCOSYLATED A1C: CPT

## 2021-05-27 PROCEDURE — 82652 VIT D 1 25-DIHYDROXY: CPT

## 2021-05-27 PROCEDURE — 82306 VITAMIN D 25 HYDROXY: CPT

## 2021-05-27 PROCEDURE — 84439 ASSAY OF FREE THYROXINE: CPT

## 2021-05-27 PROCEDURE — 85027 COMPLETE CBC AUTOMATED: CPT

## 2021-05-27 PROCEDURE — 82570 ASSAY OF URINE CREATININE: CPT | Mod: 91

## 2021-05-27 PROCEDURE — 36415 COLL VENOUS BLD VENIPUNCTURE: CPT

## 2021-05-27 PROCEDURE — 80053 COMPREHEN METABOLIC PANEL: CPT

## 2021-05-27 PROCEDURE — 84207 ASSAY OF VITAMIN B-6: CPT

## 2021-05-27 PROCEDURE — 82043 UR ALBUMIN QUANTITATIVE: CPT | Mod: 91

## 2021-05-27 PROCEDURE — 81001 URINALYSIS AUTO W/SCOPE: CPT

## 2021-05-27 PROCEDURE — 82043 UR ALBUMIN QUANTITATIVE: CPT

## 2021-05-27 PROCEDURE — 84443 ASSAY THYROID STIM HORMONE: CPT

## 2021-05-27 PROCEDURE — 85025 COMPLETE CBC W/AUTO DIFF WBC: CPT

## 2021-05-29 LAB — 1,25(OH)2D3 SERPL-MCNC: 34.3 PG/ML (ref 19.9–79.3)

## 2021-05-30 LAB — 25(OH)D3 SERPL-MCNC: 67 NG/ML (ref 30–80)

## 2021-05-31 LAB — VIT B6 SERPL-MCNC: 235.6 NMOL/L (ref 20–125)

## 2021-06-02 LAB — T4 FREE SERPL DIALY-MCNC: 2.2 NG/DL (ref 1.1–2.4)

## 2021-07-21 RX ORDER — CLOPIDOGREL BISULFATE 75 MG/1
75 TABLET ORAL DAILY
Qty: 90 TABLET | Refills: 0 | Status: SHIPPED | OUTPATIENT
Start: 2021-07-21 | End: 2021-08-03

## 2021-07-30 ENCOUNTER — HOSPITAL ENCOUNTER (OUTPATIENT)
Dept: LAB | Facility: MEDICAL CENTER | Age: 66
End: 2021-07-30
Attending: NURSE PRACTITIONER
Payer: MEDICARE

## 2021-07-30 LAB
T3FREE SERPL-MCNC: 2.02 PG/ML (ref 2–4.4)
TSH SERPL DL<=0.005 MIU/L-ACNC: 2.19 UIU/ML (ref 0.38–5.33)

## 2021-07-30 PROCEDURE — 36415 COLL VENOUS BLD VENIPUNCTURE: CPT

## 2021-07-30 PROCEDURE — 84439 ASSAY OF FREE THYROXINE: CPT

## 2021-07-30 PROCEDURE — 84481 FREE ASSAY (FT-3): CPT

## 2021-07-30 PROCEDURE — 84443 ASSAY THYROID STIM HORMONE: CPT

## 2021-08-03 ENCOUNTER — OFFICE VISIT (OUTPATIENT)
Dept: CARDIOLOGY | Facility: MEDICAL CENTER | Age: 66
End: 2021-08-03
Payer: MEDICARE

## 2021-08-03 VITALS
HEART RATE: 70 BPM | OXYGEN SATURATION: 97 % | BODY MASS INDEX: 21.78 KG/M2 | SYSTOLIC BLOOD PRESSURE: 130 MMHG | HEIGHT: 66 IN | WEIGHT: 135.5 LBS | RESPIRATION RATE: 12 BRPM | DIASTOLIC BLOOD PRESSURE: 68 MMHG

## 2021-08-03 DIAGNOSIS — I25.10 CORONARY ARTERY DISEASE DUE TO LIPID RICH PLAQUE: ICD-10-CM

## 2021-08-03 DIAGNOSIS — Z72.0 TOBACCO USE: ICD-10-CM

## 2021-08-03 DIAGNOSIS — I25.83 CORONARY ARTERY DISEASE DUE TO LIPID RICH PLAQUE: ICD-10-CM

## 2021-08-03 DIAGNOSIS — E78.5 DYSLIPIDEMIA: ICD-10-CM

## 2021-08-03 DIAGNOSIS — F43.21 GRIEVING: ICD-10-CM

## 2021-08-03 DIAGNOSIS — I10 HYPERTENSION, ESSENTIAL: ICD-10-CM

## 2021-08-03 PROCEDURE — 99214 OFFICE O/P EST MOD 30 MIN: CPT | Mod: 25 | Performed by: INTERNAL MEDICINE

## 2021-08-03 PROCEDURE — 99406 BEHAV CHNG SMOKING 3-10 MIN: CPT | Performed by: INTERNAL MEDICINE

## 2021-08-03 RX ORDER — LEVOTHYROXINE SODIUM 25 MCG
TABLET ORAL
COMMUNITY
Start: 2021-07-22 | End: 2021-09-28

## 2021-08-03 RX ORDER — METOPROLOL SUCCINATE 200 MG/1
200 TABLET, EXTENDED RELEASE ORAL DAILY
Qty: 90 TABLET | Refills: 3 | Status: SHIPPED | OUTPATIENT
Start: 2021-08-03 | End: 2022-07-10

## 2021-08-03 RX ORDER — AMLODIPINE BESYLATE 10 MG/1
5 TABLET ORAL EVERY EVENING
Status: ON HOLD | COMMUNITY
Start: 2021-06-21 | End: 2021-11-03

## 2021-08-03 RX ORDER — LEVOTHYROXINE SODIUM 200 MCG
250 TABLET ORAL EVERY MORNING
COMMUNITY
Start: 2021-06-28 | End: 2022-11-29

## 2021-08-03 ASSESSMENT — FIBROSIS 4 INDEX: FIB4 SCORE: 1.16

## 2021-08-05 LAB — T4 FREE SERPL DIALY-MCNC: 2.1 NG/DL (ref 1.1–2.4)

## 2021-08-25 ENCOUNTER — HOSPITAL ENCOUNTER (OUTPATIENT)
Dept: LAB | Facility: MEDICAL CENTER | Age: 66
End: 2021-08-25
Attending: NURSE PRACTITIONER
Payer: MEDICARE

## 2021-08-25 LAB
AMORPH CRY #/AREA URNS HPF: PRESENT /HPF
APPEARANCE UR: CLEAR
BACTERIA #/AREA URNS HPF: ABNORMAL /HPF
BILIRUB UR QL STRIP.AUTO: NEGATIVE
CAOX CRY #/AREA URNS HPF: ABNORMAL /HPF
COLOR UR: YELLOW
EPI CELLS #/AREA URNS HPF: ABNORMAL /HPF
GLUCOSE UR STRIP.AUTO-MCNC: 500 MG/DL
HYALINE CASTS #/AREA URNS LPF: ABNORMAL /LPF
KETONES UR STRIP.AUTO-MCNC: NEGATIVE MG/DL
LEUKOCYTE ESTERASE UR QL STRIP.AUTO: NEGATIVE
MICRO URNS: ABNORMAL
NITRITE UR QL STRIP.AUTO: NEGATIVE
PH UR STRIP.AUTO: 6 [PH] (ref 5–8)
PROT UR QL STRIP: 30 MG/DL
RBC # URNS HPF: ABNORMAL /HPF
RBC UR QL AUTO: NEGATIVE
SP GR UR STRIP.AUTO: 1.02
UROBILINOGEN UR STRIP.AUTO-MCNC: 1 MG/DL
WBC #/AREA URNS HPF: ABNORMAL /HPF

## 2021-08-25 PROCEDURE — 81001 URINALYSIS AUTO W/SCOPE: CPT

## 2021-09-14 ENCOUNTER — HOSPITAL ENCOUNTER (OUTPATIENT)
Dept: LAB | Facility: MEDICAL CENTER | Age: 66
End: 2021-09-14
Attending: INTERNAL MEDICINE
Payer: MEDICARE

## 2021-09-14 LAB
ALBUMIN SERPL BCP-MCNC: 3.5 G/DL (ref 3.2–4.9)
ALBUMIN SERPL BCP-MCNC: 3.7 G/DL (ref 3.2–4.9)
ALBUMIN/GLOB SERPL: 1 G/DL
ALP SERPL-CCNC: 1190 U/L (ref 30–99)
ALP SERPL-CCNC: 1206 U/L (ref 30–99)
ALT SERPL-CCNC: 175 U/L (ref 2–50)
ALT SERPL-CCNC: 183 U/L (ref 2–50)
AMORPH CRY #/AREA URNS HPF: PRESENT /HPF
ANION GAP SERPL CALC-SCNC: 10 MMOL/L (ref 7–16)
APPEARANCE UR: CLEAR
AST SERPL-CCNC: 147 U/L (ref 12–45)
AST SERPL-CCNC: 148 U/L (ref 12–45)
BACTERIA #/AREA URNS HPF: NEGATIVE /HPF
BASOPHILS # BLD AUTO: 1.5 % (ref 0–1.8)
BASOPHILS # BLD AUTO: 1.5 % (ref 0–1.8)
BASOPHILS # BLD: 0.11 K/UL (ref 0–0.12)
BASOPHILS # BLD: 0.11 K/UL (ref 0–0.12)
BILIRUB CONJ SERPL-MCNC: 1.6 MG/DL (ref 0.1–0.5)
BILIRUB INDIRECT SERPL-MCNC: 0.7 MG/DL (ref 0–1)
BILIRUB SERPL-MCNC: 2.3 MG/DL (ref 0.1–1.5)
BILIRUB SERPL-MCNC: 2.3 MG/DL (ref 0.1–1.5)
BILIRUB UR QL STRIP.AUTO: ABNORMAL
BUN SERPL-MCNC: 13 MG/DL (ref 8–22)
CALCIUM SERPL-MCNC: 9.1 MG/DL (ref 8.5–10.5)
CHLORIDE SERPL-SCNC: 106 MMOL/L (ref 96–112)
CO2 SERPL-SCNC: 23 MMOL/L (ref 20–33)
COLOR UR: ABNORMAL
CREAT SERPL-MCNC: 0.9 MG/DL (ref 0.5–1.4)
EOSINOPHIL # BLD AUTO: 0.21 K/UL (ref 0–0.51)
EOSINOPHIL # BLD AUTO: 0.22 K/UL (ref 0–0.51)
EOSINOPHIL NFR BLD: 2.8 % (ref 0–6.9)
EOSINOPHIL NFR BLD: 2.9 % (ref 0–6.9)
EPI CELLS #/AREA URNS HPF: NEGATIVE /HPF
ERYTHROCYTE [DISTWIDTH] IN BLOOD BY AUTOMATED COUNT: 58.3 FL (ref 35.9–50)
ERYTHROCYTE [DISTWIDTH] IN BLOOD BY AUTOMATED COUNT: 58.7 FL (ref 35.9–50)
FERRITIN SERPL-MCNC: 189 NG/ML (ref 10–291)
GLOBULIN SER CALC-MCNC: 3.5 G/DL (ref 1.9–3.5)
GLUCOSE SERPL-MCNC: 107 MG/DL (ref 65–99)
GLUCOSE UR STRIP.AUTO-MCNC: NEGATIVE MG/DL
HCT VFR BLD AUTO: 38.3 % (ref 37–47)
HCT VFR BLD AUTO: 38.5 % (ref 37–47)
HGB BLD-MCNC: 12.7 G/DL (ref 12–16)
HGB BLD-MCNC: 13 G/DL (ref 12–16)
HYALINE CASTS #/AREA URNS LPF: ABNORMAL /LPF
IMM GRANULOCYTES # BLD AUTO: 0.03 K/UL (ref 0–0.11)
IMM GRANULOCYTES # BLD AUTO: 0.03 K/UL (ref 0–0.11)
IMM GRANULOCYTES NFR BLD AUTO: 0.4 % (ref 0–0.9)
IMM GRANULOCYTES NFR BLD AUTO: 0.4 % (ref 0–0.9)
IRON SATN MFR SERPL: 32 % (ref 15–55)
IRON SERPL-MCNC: 94 UG/DL (ref 40–170)
KETONES UR STRIP.AUTO-MCNC: NEGATIVE MG/DL
LEUKOCYTE ESTERASE UR QL STRIP.AUTO: NEGATIVE
LYMPHOCYTES # BLD AUTO: 1.08 K/UL (ref 1–4.8)
LYMPHOCYTES # BLD AUTO: 1.11 K/UL (ref 1–4.8)
LYMPHOCYTES NFR BLD: 14.3 % (ref 22–41)
LYMPHOCYTES NFR BLD: 15 % (ref 22–41)
MCH RBC QN AUTO: 31.4 PG (ref 27–33)
MCH RBC QN AUTO: 32.2 PG (ref 27–33)
MCHC RBC AUTO-ENTMCNC: 33.2 G/DL (ref 33.6–35)
MCHC RBC AUTO-ENTMCNC: 33.8 G/DL (ref 33.6–35)
MCV RBC AUTO: 94.8 FL (ref 81.4–97.8)
MCV RBC AUTO: 95.3 FL (ref 81.4–97.8)
MICRO URNS: ABNORMAL
MONOCYTES # BLD AUTO: 0.87 K/UL (ref 0–0.85)
MONOCYTES # BLD AUTO: 0.88 K/UL (ref 0–0.85)
MONOCYTES NFR BLD AUTO: 11.7 % (ref 0–13.4)
MONOCYTES NFR BLD AUTO: 11.8 % (ref 0–13.4)
NEUTROPHILS # BLD AUTO: 5.07 K/UL (ref 2–7.15)
NEUTROPHILS # BLD AUTO: 5.21 K/UL (ref 2–7.15)
NEUTROPHILS NFR BLD: 68.5 % (ref 44–72)
NEUTROPHILS NFR BLD: 69.2 % (ref 44–72)
NITRITE UR QL STRIP.AUTO: NEGATIVE
NRBC # BLD AUTO: 0 K/UL
NRBC # BLD AUTO: 0 K/UL
NRBC BLD-RTO: 0 /100 WBC
NRBC BLD-RTO: 0 /100 WBC
PH UR STRIP.AUTO: 6 [PH] (ref 5–8)
PLATELET # BLD AUTO: 355 K/UL (ref 164–446)
PLATELET # BLD AUTO: 368 K/UL (ref 164–446)
PMV BLD AUTO: 11.5 FL (ref 9–12.9)
PMV BLD AUTO: 11.9 FL (ref 9–12.9)
POTASSIUM SERPL-SCNC: 4.5 MMOL/L (ref 3.6–5.5)
PROT SERPL-MCNC: 7 G/DL (ref 6–8.2)
PROT SERPL-MCNC: 7.2 G/DL (ref 6–8.2)
PROT UR QL STRIP: 100 MG/DL
RBC # BLD AUTO: 4.04 M/UL (ref 4.2–5.4)
RBC # BLD AUTO: 4.04 M/UL (ref 4.2–5.4)
RBC # URNS HPF: ABNORMAL /HPF
RBC UR QL AUTO: ABNORMAL
SODIUM SERPL-SCNC: 139 MMOL/L (ref 135–145)
SP GR UR STRIP.AUTO: 1.02
TIBC SERPL-MCNC: 296 UG/DL (ref 250–450)
TRANSFERRIN SERPL-MCNC: 245 MG/DL (ref 200–370)
UIBC SERPL-MCNC: 202 UG/DL (ref 110–370)
UROBILINOGEN UR STRIP.AUTO-MCNC: 0.2 MG/DL
WBC # BLD AUTO: 7.4 K/UL (ref 4.8–10.8)
WBC # BLD AUTO: 7.5 K/UL (ref 4.8–10.8)
WBC #/AREA URNS HPF: ABNORMAL /HPF

## 2021-09-14 PROCEDURE — 80053 COMPREHEN METABOLIC PANEL: CPT

## 2021-09-14 PROCEDURE — 83540 ASSAY OF IRON: CPT

## 2021-09-14 PROCEDURE — 85025 COMPLETE CBC W/AUTO DIFF WBC: CPT

## 2021-09-14 PROCEDURE — 85025 COMPLETE CBC W/AUTO DIFF WBC: CPT | Mod: 91

## 2021-09-14 PROCEDURE — 36415 COLL VENOUS BLD VENIPUNCTURE: CPT

## 2021-09-14 PROCEDURE — 81001 URINALYSIS AUTO W/SCOPE: CPT

## 2021-09-14 PROCEDURE — 83550 IRON BINDING TEST: CPT

## 2021-09-14 PROCEDURE — 84466 ASSAY OF TRANSFERRIN: CPT

## 2021-09-14 PROCEDURE — 82728 ASSAY OF FERRITIN: CPT

## 2021-09-14 PROCEDURE — 80076 HEPATIC FUNCTION PANEL: CPT

## 2021-09-17 ENCOUNTER — HOSPITAL ENCOUNTER (OUTPATIENT)
Dept: RADIOLOGY | Facility: MEDICAL CENTER | Age: 66
End: 2021-09-17
Attending: INTERNAL MEDICINE
Payer: MEDICARE

## 2021-09-17 DIAGNOSIS — R91.1 SOLITARY LUNG NODULE: ICD-10-CM

## 2021-09-17 DIAGNOSIS — R10.30 LOWER ABDOMINAL PAIN: ICD-10-CM

## 2021-09-17 DIAGNOSIS — M54.6 THORACIC BACK PAIN, UNSPECIFIED BACK PAIN LATERALITY, UNSPECIFIED CHRONICITY: ICD-10-CM

## 2021-09-17 DIAGNOSIS — R74.8 ELEVATED LIVER ENZYMES: ICD-10-CM

## 2021-09-17 PROCEDURE — 74177 CT ABD & PELVIS W/CONTRAST: CPT | Mod: MH

## 2021-09-17 PROCEDURE — 700117 HCHG RX CONTRAST REV CODE 255: Performed by: INTERNAL MEDICINE

## 2021-09-17 RX ADMIN — IOHEXOL 25 ML: 240 INJECTION, SOLUTION INTRATHECAL; INTRAVASCULAR; INTRAVENOUS; ORAL at 09:08

## 2021-09-17 RX ADMIN — IOHEXOL 100 ML: 350 INJECTION, SOLUTION INTRAVENOUS at 09:08

## 2021-09-22 ENCOUNTER — APPOINTMENT (OUTPATIENT)
Dept: RADIOLOGY | Facility: MEDICAL CENTER | Age: 66
DRG: 445 | End: 2021-09-22
Attending: EMERGENCY MEDICINE
Payer: MEDICARE

## 2021-09-22 ENCOUNTER — HOSPITAL ENCOUNTER (INPATIENT)
Facility: MEDICAL CENTER | Age: 66
LOS: 3 days | DRG: 445 | End: 2021-09-26
Attending: EMERGENCY MEDICINE | Admitting: INTERNAL MEDICINE
Payer: MEDICARE

## 2021-09-22 DIAGNOSIS — E86.0 DEHYDRATION: ICD-10-CM

## 2021-09-22 DIAGNOSIS — F41.9 ANXIETY: ICD-10-CM

## 2021-09-22 DIAGNOSIS — E10.65 TYPE 1 DIABETES MELLITUS WITH HYPERGLYCEMIA (HCC): ICD-10-CM

## 2021-09-22 DIAGNOSIS — R17 JAUNDICE: ICD-10-CM

## 2021-09-22 DIAGNOSIS — R79.89 ABNORMAL LFTS: ICD-10-CM

## 2021-09-22 DIAGNOSIS — Z86.79 HISTORY OF CORONARY ARTERY DISEASE: ICD-10-CM

## 2021-09-22 DIAGNOSIS — E87.1 HYPONATREMIA: ICD-10-CM

## 2021-09-22 LAB
ALBUMIN SERPL BCP-MCNC: 3.4 G/DL (ref 3.2–4.9)
ALBUMIN/GLOB SERPL: 0.9 G/DL
ALP SERPL-CCNC: 2541 U/L (ref 30–99)
ALT SERPL-CCNC: 280 U/L (ref 2–50)
AMMONIA PLAS-SCNC: 26 UMOL/L (ref 11–45)
ANION GAP SERPL CALC-SCNC: 12 MMOL/L (ref 7–16)
APAP SERPL-MCNC: <5 UG/ML (ref 10–30)
APPEARANCE UR: ABNORMAL
APTT PPP: 27.3 SEC (ref 24.7–36)
AST SERPL-CCNC: 326 U/L (ref 12–45)
BACTERIA #/AREA URNS HPF: ABNORMAL /HPF
BASE EXCESS BLDV CALC-SCNC: -2 MMOL/L
BASOPHILS # BLD AUTO: 0.9 % (ref 0–1.8)
BASOPHILS # BLD: 0.09 K/UL (ref 0–0.12)
BILIRUB SERPL-MCNC: 7.7 MG/DL (ref 0.1–1.5)
BILIRUB UR QL STRIP.AUTO: ABNORMAL
BODY TEMPERATURE: ABNORMAL CENTIGRADE
BUN SERPL-MCNC: 19 MG/DL (ref 8–22)
CALCIUM SERPL-MCNC: 8.6 MG/DL (ref 8.4–10.2)
CAOX CRY #/AREA URNS HPF: ABNORMAL /HPF
CHLORIDE SERPL-SCNC: 96 MMOL/L (ref 96–112)
CO2 SERPL-SCNC: 23 MMOL/L (ref 20–33)
COLOR UR: ABNORMAL
CREAT SERPL-MCNC: 0.94 MG/DL (ref 0.5–1.4)
CRP SERPL HS-MCNC: 2.85 MG/DL (ref 0–0.75)
EKG IMPRESSION: NORMAL
EOSINOPHIL # BLD AUTO: 0.16 K/UL (ref 0–0.51)
EOSINOPHIL NFR BLD: 1.6 % (ref 0–6.9)
EPI CELLS #/AREA URNS HPF: ABNORMAL /HPF
ERYTHROCYTE [DISTWIDTH] IN BLOOD BY AUTOMATED COUNT: 51.9 FL (ref 35.9–50)
GLOBULIN SER CALC-MCNC: 3.6 G/DL (ref 1.9–3.5)
GLUCOSE SERPL-MCNC: 259 MG/DL (ref 65–99)
GLUCOSE UR STRIP.AUTO-MCNC: 500 MG/DL
HCO3 BLDV-SCNC: 23 MMOL/L (ref 24–28)
HCT VFR BLD AUTO: 35.6 % (ref 37–47)
HGB BLD-MCNC: 11.3 G/DL (ref 12–16)
HYALINE CASTS #/AREA URNS LPF: ABNORMAL /LPF
IMM GRANULOCYTES # BLD AUTO: 0.06 K/UL (ref 0–0.11)
IMM GRANULOCYTES NFR BLD AUTO: 0.6 % (ref 0–0.9)
INR PPP: 1.01 (ref 0.87–1.13)
KETONES UR STRIP.AUTO-MCNC: ABNORMAL MG/DL
LACTATE BLD-SCNC: 1.7 MMOL/L (ref 0.5–2)
LEUKOCYTE ESTERASE UR QL STRIP.AUTO: NEGATIVE
LIPASE SERPL-CCNC: 20 U/L (ref 7–58)
LYMPHOCYTES # BLD AUTO: 1.12 K/UL (ref 1–4.8)
LYMPHOCYTES NFR BLD: 11.3 % (ref 22–41)
MAGNESIUM SERPL-MCNC: 1.8 MG/DL (ref 1.5–2.5)
MCH RBC QN AUTO: 31.5 PG (ref 27–33)
MCHC RBC AUTO-ENTMCNC: 31.7 G/DL (ref 33.6–35)
MCV RBC AUTO: 99.2 FL (ref 81.4–97.8)
MICRO URNS: ABNORMAL
MONOCYTES # BLD AUTO: 1.2 K/UL (ref 0–0.85)
MONOCYTES NFR BLD AUTO: 12.1 % (ref 0–13.4)
MUCOUS THREADS #/AREA URNS HPF: ABNORMAL /HPF
NEUTROPHILS # BLD AUTO: 7.29 K/UL (ref 2–7.15)
NEUTROPHILS NFR BLD: 73.5 % (ref 44–72)
NITRITE UR QL STRIP.AUTO: NEGATIVE
NRBC # BLD AUTO: 0 K/UL
NRBC BLD-RTO: 0 /100 WBC
PCO2 BLDV: 38.2 MMHG (ref 41–51)
PH BLDV: 7.39 [PH] (ref 7.31–7.45)
PH UR STRIP.AUTO: 5.5 [PH] (ref 5–8)
PLATELET # BLD AUTO: 307 K/UL (ref 164–446)
PMV BLD AUTO: 11 FL (ref 9–12.9)
PO2 BLDV: 23.1 MMHG (ref 25–40)
POTASSIUM SERPL-SCNC: 3.8 MMOL/L (ref 3.6–5.5)
PROCALCITONIN SERPL-MCNC: 0.63 NG/ML
PROT SERPL-MCNC: 7 G/DL (ref 6–8.2)
PROT UR QL STRIP: 100 MG/DL
PROTHROMBIN TIME: 12.5 SEC (ref 12–14.6)
RBC # BLD AUTO: 3.59 M/UL (ref 4.2–5.4)
RBC # URNS HPF: ABNORMAL /HPF
RBC UR QL AUTO: ABNORMAL
SAO2 % BLDV: 40.7 %
SODIUM SERPL-SCNC: 131 MMOL/L (ref 135–145)
SP GR UR STRIP.AUTO: >=1.03
TROPONIN T SERPL-MCNC: 34 NG/L (ref 6–19)
WBC # BLD AUTO: 9.9 K/UL (ref 4.8–10.8)
WBC #/AREA URNS HPF: ABNORMAL /HPF

## 2021-09-22 PROCEDURE — 81001 URINALYSIS AUTO W/SCOPE: CPT

## 2021-09-22 PROCEDURE — 36415 COLL VENOUS BLD VENIPUNCTURE: CPT

## 2021-09-22 PROCEDURE — 84145 PROCALCITONIN (PCT): CPT

## 2021-09-22 PROCEDURE — 82140 ASSAY OF AMMONIA: CPT

## 2021-09-22 PROCEDURE — 71045 X-RAY EXAM CHEST 1 VIEW: CPT

## 2021-09-22 PROCEDURE — 700105 HCHG RX REV CODE 258: Performed by: EMERGENCY MEDICINE

## 2021-09-22 PROCEDURE — 82803 BLOOD GASES ANY COMBINATION: CPT

## 2021-09-22 PROCEDURE — 99285 EMERGENCY DEPT VISIT HI MDM: CPT

## 2021-09-22 PROCEDURE — 76705 ECHO EXAM OF ABDOMEN: CPT

## 2021-09-22 PROCEDURE — 85025 COMPLETE CBC W/AUTO DIFF WBC: CPT

## 2021-09-22 PROCEDURE — 93005 ELECTROCARDIOGRAM TRACING: CPT | Performed by: EMERGENCY MEDICINE

## 2021-09-22 PROCEDURE — 80143 DRUG ASSAY ACETAMINOPHEN: CPT

## 2021-09-22 PROCEDURE — 83605 ASSAY OF LACTIC ACID: CPT

## 2021-09-22 PROCEDURE — 87040 BLOOD CULTURE FOR BACTERIA: CPT | Mod: 91

## 2021-09-22 PROCEDURE — 85610 PROTHROMBIN TIME: CPT

## 2021-09-22 PROCEDURE — 84484 ASSAY OF TROPONIN QUANT: CPT

## 2021-09-22 PROCEDURE — 80053 COMPREHEN METABOLIC PANEL: CPT

## 2021-09-22 PROCEDURE — 85730 THROMBOPLASTIN TIME PARTIAL: CPT

## 2021-09-22 PROCEDURE — 80074 ACUTE HEPATITIS PANEL: CPT

## 2021-09-22 PROCEDURE — 83735 ASSAY OF MAGNESIUM: CPT

## 2021-09-22 PROCEDURE — 86140 C-REACTIVE PROTEIN: CPT

## 2021-09-22 PROCEDURE — 83690 ASSAY OF LIPASE: CPT

## 2021-09-22 RX ORDER — SODIUM CHLORIDE, SODIUM LACTATE, POTASSIUM CHLORIDE, CALCIUM CHLORIDE 600; 310; 30; 20 MG/100ML; MG/100ML; MG/100ML; MG/100ML
1000 INJECTION, SOLUTION INTRAVENOUS ONCE
Status: COMPLETED | OUTPATIENT
Start: 2021-09-22 | End: 2021-09-23

## 2021-09-22 RX ADMIN — SODIUM CHLORIDE, POTASSIUM CHLORIDE, SODIUM LACTATE AND CALCIUM CHLORIDE 1000 ML: 600; 310; 30; 20 INJECTION, SOLUTION INTRAVENOUS at 20:38

## 2021-09-22 ASSESSMENT — PAIN DESCRIPTION - PAIN TYPE: TYPE: ACUTE PAIN

## 2021-09-22 ASSESSMENT — FIBROSIS 4 INDEX: FIB4 SCORE: 1.9

## 2021-09-23 PROBLEM — R74.8 ELEVATED LIVER ENZYMES: Status: ACTIVE | Noted: 2021-09-23

## 2021-09-23 PROBLEM — E87.1 HYPONATREMIA: Status: ACTIVE | Noted: 2021-09-23

## 2021-09-23 PROBLEM — D53.9 MACROCYTIC ANEMIA: Status: ACTIVE | Noted: 2021-09-23

## 2021-09-23 PROBLEM — R79.89 ELEVATED TROPONIN: Status: ACTIVE | Noted: 2021-09-23

## 2021-09-23 PROBLEM — M25.552 LEFT HIP PAIN: Status: ACTIVE | Noted: 2018-03-15

## 2021-09-23 LAB
ALBUMIN SERPL BCP-MCNC: 2.5 G/DL (ref 3.2–4.9)
ALBUMIN SERPL BCP-MCNC: 2.6 G/DL (ref 3.2–4.9)
ALBUMIN/GLOB SERPL: 0.8 G/DL
ALP SERPL-CCNC: 2405 U/L (ref 30–99)
ALT SERPL-CCNC: 241 U/L (ref 2–50)
ANION GAP SERPL CALC-SCNC: 14 MMOL/L (ref 7–16)
AST SERPL-CCNC: 274 U/L (ref 12–45)
BILIRUB SERPL-MCNC: 7.5 MG/DL (ref 0.1–1.5)
BUN SERPL-MCNC: 16 MG/DL (ref 8–22)
BUN SERPL-MCNC: 16 MG/DL (ref 8–22)
CALCIUM SERPL-MCNC: 8 MG/DL (ref 8.4–10.2)
CALCIUM SERPL-MCNC: 8.2 MG/DL (ref 8.4–10.2)
CHLORIDE SERPL-SCNC: 96 MMOL/L (ref 96–112)
CHLORIDE SERPL-SCNC: 99 MMOL/L (ref 96–112)
CO2 SERPL-SCNC: 20 MMOL/L (ref 20–33)
CO2 SERPL-SCNC: 21 MMOL/L (ref 20–33)
CREAT SERPL-MCNC: 0.67 MG/DL (ref 0.5–1.4)
CREAT SERPL-MCNC: 0.74 MG/DL (ref 0.5–1.4)
ERYTHROCYTE [DISTWIDTH] IN BLOOD BY AUTOMATED COUNT: 50.4 FL (ref 35.9–50)
EST. AVERAGE GLUCOSE BLD GHB EST-MCNC: 197 MG/DL
GLOBULIN SER CALC-MCNC: 3.3 G/DL (ref 1.9–3.5)
GLUCOSE BLD-MCNC: 146 MG/DL (ref 65–99)
GLUCOSE BLD-MCNC: 311 MG/DL (ref 65–99)
GLUCOSE BLD-MCNC: 350 MG/DL (ref 65–99)
GLUCOSE BLD-MCNC: 419 MG/DL (ref 65–99)
GLUCOSE SERPL-MCNC: 295 MG/DL (ref 65–99)
GLUCOSE SERPL-MCNC: 422 MG/DL (ref 65–99)
HAV IGM SERPL QL IA: NORMAL
HBA1C MFR BLD: 8.5 % (ref 4–5.6)
HBV CORE IGM SER QL: NORMAL
HBV SURFACE AG SER QL: NORMAL
HCT VFR BLD AUTO: 33 % (ref 37–47)
HCV AB SER QL: NORMAL
HGB BLD-MCNC: 10.6 G/DL (ref 12–16)
MAGNESIUM SERPL-MCNC: 1.8 MG/DL (ref 1.5–2.5)
MCH RBC QN AUTO: 31.3 PG (ref 27–33)
MCHC RBC AUTO-ENTMCNC: 32.1 G/DL (ref 33.6–35)
MCV RBC AUTO: 97.3 FL (ref 81.4–97.8)
PHOSPHATE SERPL-MCNC: 2.4 MG/DL (ref 2.5–4.5)
PLATELET # BLD AUTO: 286 K/UL (ref 164–446)
PMV BLD AUTO: 11.4 FL (ref 9–12.9)
POTASSIUM SERPL-SCNC: 4.3 MMOL/L (ref 3.6–5.5)
POTASSIUM SERPL-SCNC: 4.6 MMOL/L (ref 3.6–5.5)
PROT SERPL-MCNC: 5.9 G/DL (ref 6–8.2)
RBC # BLD AUTO: 3.39 M/UL (ref 4.2–5.4)
SODIUM SERPL-SCNC: 130 MMOL/L (ref 135–145)
SODIUM SERPL-SCNC: 131 MMOL/L (ref 135–145)
TROPONIN T SERPL-MCNC: 31 NG/L (ref 6–19)
TROPONIN T SERPL-MCNC: 34 NG/L (ref 6–19)
WBC # BLD AUTO: 9.3 K/UL (ref 4.8–10.8)

## 2021-09-23 PROCEDURE — 80053 COMPREHEN METABOLIC PANEL: CPT

## 2021-09-23 PROCEDURE — 85027 COMPLETE CBC AUTOMATED: CPT

## 2021-09-23 PROCEDURE — 700102 HCHG RX REV CODE 250 W/ 637 OVERRIDE(OP): Performed by: NURSE PRACTITIONER

## 2021-09-23 PROCEDURE — 770006 HCHG ROOM/CARE - MED/SURG/GYN SEMI*

## 2021-09-23 PROCEDURE — 36415 COLL VENOUS BLD VENIPUNCTURE: CPT

## 2021-09-23 PROCEDURE — 80069 RENAL FUNCTION PANEL: CPT

## 2021-09-23 PROCEDURE — 83735 ASSAY OF MAGNESIUM: CPT

## 2021-09-23 PROCEDURE — 84484 ASSAY OF TROPONIN QUANT: CPT

## 2021-09-23 PROCEDURE — 74177 CT ABD & PELVIS W/CONTRAST: CPT | Mod: ME

## 2021-09-23 PROCEDURE — 700117 HCHG RX CONTRAST REV CODE 255: Performed by: EMERGENCY MEDICINE

## 2021-09-23 PROCEDURE — 700102 HCHG RX REV CODE 250 W/ 637 OVERRIDE(OP): Performed by: INTERNAL MEDICINE

## 2021-09-23 PROCEDURE — 83036 HEMOGLOBIN GLYCOSYLATED A1C: CPT

## 2021-09-23 PROCEDURE — 700111 HCHG RX REV CODE 636 W/ 250 OVERRIDE (IP): Performed by: INTERNAL MEDICINE

## 2021-09-23 PROCEDURE — 700105 HCHG RX REV CODE 258: Performed by: INTERNAL MEDICINE

## 2021-09-23 PROCEDURE — 82962 GLUCOSE BLOOD TEST: CPT | Mod: 91

## 2021-09-23 PROCEDURE — A9270 NON-COVERED ITEM OR SERVICE: HCPCS | Performed by: INTERNAL MEDICINE

## 2021-09-23 PROCEDURE — 99223 1ST HOSP IP/OBS HIGH 75: CPT | Performed by: INTERNAL MEDICINE

## 2021-09-23 RX ORDER — AMOXICILLIN 250 MG
2 CAPSULE ORAL 2 TIMES DAILY
Status: DISCONTINUED | OUTPATIENT
Start: 2021-09-23 | End: 2021-09-26 | Stop reason: HOSPADM

## 2021-09-23 RX ORDER — ALPRAZOLAM 0.5 MG/1
0.5 TABLET ORAL 3 TIMES DAILY PRN
Status: DISCONTINUED | OUTPATIENT
Start: 2021-09-23 | End: 2021-09-24

## 2021-09-23 RX ORDER — PROCHLORPERAZINE EDISYLATE 5 MG/ML
5-10 INJECTION INTRAMUSCULAR; INTRAVENOUS EVERY 4 HOURS PRN
Status: DISCONTINUED | OUTPATIENT
Start: 2021-09-23 | End: 2021-09-26 | Stop reason: HOSPADM

## 2021-09-23 RX ORDER — ONDANSETRON 2 MG/ML
4 INJECTION INTRAMUSCULAR; INTRAVENOUS EVERY 4 HOURS PRN
Status: DISCONTINUED | OUTPATIENT
Start: 2021-09-23 | End: 2021-09-26 | Stop reason: HOSPADM

## 2021-09-23 RX ORDER — LABETALOL HYDROCHLORIDE 5 MG/ML
10 INJECTION, SOLUTION INTRAVENOUS EVERY 4 HOURS PRN
Status: DISCONTINUED | OUTPATIENT
Start: 2021-09-23 | End: 2021-09-26 | Stop reason: HOSPADM

## 2021-09-23 RX ORDER — ENALAPRILAT 1.25 MG/ML
1.25 INJECTION INTRAVENOUS EVERY 6 HOURS PRN
Status: DISCONTINUED | OUTPATIENT
Start: 2021-09-23 | End: 2021-09-26 | Stop reason: HOSPADM

## 2021-09-23 RX ORDER — POLYETHYLENE GLYCOL 3350 17 G/17G
1 POWDER, FOR SOLUTION ORAL
Status: DISCONTINUED | OUTPATIENT
Start: 2021-09-23 | End: 2021-09-26 | Stop reason: HOSPADM

## 2021-09-23 RX ORDER — LEVOTHYROXINE SODIUM 0.05 MG/1
25 TABLET ORAL
Status: DISCONTINUED | OUTPATIENT
Start: 2021-09-23 | End: 2021-09-26 | Stop reason: HOSPADM

## 2021-09-23 RX ORDER — SODIUM CHLORIDE 9 MG/ML
INJECTION, SOLUTION INTRAVENOUS CONTINUOUS
Status: DISCONTINUED | OUTPATIENT
Start: 2021-09-23 | End: 2021-09-25

## 2021-09-23 RX ORDER — LEVOTHYROXINE SODIUM 0.05 MG/1
200 TABLET ORAL
Status: DISCONTINUED | OUTPATIENT
Start: 2021-09-23 | End: 2021-09-26 | Stop reason: HOSPADM

## 2021-09-23 RX ORDER — DEXTROSE MONOHYDRATE 25 G/50ML
50 INJECTION, SOLUTION INTRAVENOUS
Status: DISCONTINUED | OUTPATIENT
Start: 2021-09-23 | End: 2021-09-26 | Stop reason: HOSPADM

## 2021-09-23 RX ORDER — TRAZODONE HYDROCHLORIDE 50 MG/1
100 TABLET ORAL
Status: DISCONTINUED | OUTPATIENT
Start: 2021-09-23 | End: 2021-09-23

## 2021-09-23 RX ORDER — METOPROLOL SUCCINATE 25 MG/1
200 TABLET, EXTENDED RELEASE ORAL DAILY
Status: DISCONTINUED | OUTPATIENT
Start: 2021-09-23 | End: 2021-09-26 | Stop reason: HOSPADM

## 2021-09-23 RX ORDER — NICOTINE 21 MG/24HR
21 PATCH, TRANSDERMAL 24 HOURS TRANSDERMAL
Status: DISCONTINUED | OUTPATIENT
Start: 2021-09-24 | End: 2021-09-26 | Stop reason: HOSPADM

## 2021-09-23 RX ORDER — AMLODIPINE BESYLATE 5 MG/1
5 TABLET ORAL
Status: DISCONTINUED | OUTPATIENT
Start: 2021-09-23 | End: 2021-09-26 | Stop reason: HOSPADM

## 2021-09-23 RX ORDER — ONDANSETRON 4 MG/1
4 TABLET, ORALLY DISINTEGRATING ORAL EVERY 4 HOURS PRN
Status: DISCONTINUED | OUTPATIENT
Start: 2021-09-23 | End: 2021-09-26 | Stop reason: HOSPADM

## 2021-09-23 RX ORDER — BISACODYL 10 MG
10 SUPPOSITORY, RECTAL RECTAL
Status: DISCONTINUED | OUTPATIENT
Start: 2021-09-23 | End: 2021-09-26 | Stop reason: HOSPADM

## 2021-09-23 RX ORDER — SERTRALINE HYDROCHLORIDE 100 MG/1
100 TABLET, FILM COATED ORAL EVERY EVENING
COMMUNITY
End: 2022-07-10

## 2021-09-23 RX ORDER — CLONIDINE HYDROCHLORIDE 0.1 MG/1
0.1 TABLET ORAL EVERY 6 HOURS PRN
Status: DISCONTINUED | OUTPATIENT
Start: 2021-09-23 | End: 2021-09-26 | Stop reason: HOSPADM

## 2021-09-23 RX ORDER — PROMETHAZINE HYDROCHLORIDE 25 MG/1
12.5-25 SUPPOSITORY RECTAL EVERY 4 HOURS PRN
Status: DISCONTINUED | OUTPATIENT
Start: 2021-09-23 | End: 2021-09-26 | Stop reason: HOSPADM

## 2021-09-23 RX ORDER — LIDOCAINE 50 MG/G
1 PATCH TOPICAL EVERY 24 HOURS
Status: DISCONTINUED | OUTPATIENT
Start: 2021-09-23 | End: 2021-09-23

## 2021-09-23 RX ORDER — PROMETHAZINE HYDROCHLORIDE 25 MG/1
12.5-25 TABLET ORAL EVERY 4 HOURS PRN
Status: DISCONTINUED | OUTPATIENT
Start: 2021-09-23 | End: 2021-09-26 | Stop reason: HOSPADM

## 2021-09-23 RX ADMIN — ASPIRIN 81 MG: 81 TABLET, COATED ORAL at 05:44

## 2021-09-23 RX ADMIN — INSULIN HUMAN 9 UNITS: 100 INJECTION, SOLUTION PARENTERAL at 06:08

## 2021-09-23 RX ADMIN — INSULIN HUMAN 6 UNITS: 100 INJECTION, SOLUTION PARENTERAL at 21:58

## 2021-09-23 RX ADMIN — ENOXAPARIN SODIUM 40 MG: 40 INJECTION SUBCUTANEOUS at 06:09

## 2021-09-23 RX ADMIN — SODIUM CHLORIDE: 9 INJECTION, SOLUTION INTRAVENOUS at 02:31

## 2021-09-23 RX ADMIN — INSULIN HUMAN 6 UNITS: 100 INJECTION, SOLUTION PARENTERAL at 16:16

## 2021-09-23 RX ADMIN — LEVOTHYROXINE SODIUM 200 MCG: 0.05 TABLET ORAL at 05:46

## 2021-09-23 RX ADMIN — ALPRAZOLAM 0.5 MG: 0.5 TABLET ORAL at 22:07

## 2021-09-23 RX ADMIN — LEVOTHYROXINE SODIUM 25 MCG: 0.05 TABLET ORAL at 05:44

## 2021-09-23 RX ADMIN — INSULIN GLARGINE 15 UNITS: 100 INJECTION, SOLUTION SUBCUTANEOUS at 17:19

## 2021-09-23 RX ADMIN — ALPRAZOLAM 0.5 MG: 0.5 TABLET ORAL at 02:51

## 2021-09-23 RX ADMIN — AMLODIPINE BESYLATE 5 MG: 5 TABLET ORAL at 05:44

## 2021-09-23 RX ADMIN — IOHEXOL 100 ML: 350 INJECTION, SOLUTION INTRAVENOUS at 00:54

## 2021-09-23 RX ADMIN — METOPROLOL SUCCINATE 200 MG: 25 TABLET, EXTENDED RELEASE ORAL at 05:43

## 2021-09-23 ASSESSMENT — ENCOUNTER SYMPTOMS
STRIDOR: 0
COUGH: 0
WEAKNESS: 0
NAUSEA: 1
FALLS: 0
ABDOMINAL PAIN: 1
SPUTUM PRODUCTION: 0
FEVER: 0
RESPIRATORY NEGATIVE: 1
DIARRHEA: 1
BACK PAIN: 1
BLOOD IN STOOL: 0
DEPRESSION: 1
LOSS OF CONSCIOUSNESS: 0
SHORTNESS OF BREATH: 0
PALPITATIONS: 0
CHILLS: 0
VOMITING: 0
DIZZINESS: 0
WEIGHT LOSS: 1
MYALGIAS: 0
EYES NEGATIVE: 1
CONSTIPATION: 0
CARDIOVASCULAR NEGATIVE: 1
WEAKNESS: 1
TINGLING: 0
DEPRESSION: 0
DIARRHEA: 0
DIZZINESS: 1
HEADACHES: 0

## 2021-09-23 ASSESSMENT — PAIN DESCRIPTION - PAIN TYPE
TYPE: ACUTE PAIN
TYPE: ACUTE PAIN

## 2021-09-23 NOTE — H&P
"Hospital Medicine History & Physical Note    Date of Service  9/23/2021    Primary Care Physician  Jarrell Yates M.D.    Consultants  None    Specialist Names: None    Code Status  Full Code    Chief Complaint  Chief Complaint   Patient presents with   • Jaundice     Reports \"my enzymes were high and I looked yellow so my GP sent me here. I don't even drink. I mean occasionally, but...\".    • Abdominal Pain     Reports low abd pain \"just irritating\". Reports as well nausea and diarrhea. Denies vomiting or bloody stools.        History of Presenting Illness  Anu Manuel is a 66 y.o. female who presented 9/22/2021 with jaundice.  Patient recently began noticing jaundice as well as light-colored stool and dark-colored urine.  She has seen a GI doctor for generalized abdominal discomfort, they ordered labs and noted elevated total bilirubin and alk phos.  Patient is also since then seen her primary care provider who noted worsening jaundice and recommended she come to the ER.  Upon arrival, she was noted to have significant elevation in her alk phos and total bilirubin, greater than doubled from recent labs.  Patient does have a generalized abdominal discomfort, does have some pain in the lower quadrants that is mild and a burning but no right upper quadrant or epigastric pain.  She does have some nausea but denies any vomiting.  She also complained of left hip pain with a burning sensation down the front of her leg it has been going on for a couple of months.  I discussed the case including labs and imaging with the ER physician.    I discussed the plan of care with patient.    Review of Systems  Review of Systems   Constitutional: Negative for chills, fever and malaise/fatigue.   HENT: Negative for congestion.    Respiratory: Negative for cough, sputum production, shortness of breath and stridor.    Cardiovascular: Negative for chest pain, palpitations and leg swelling.   Gastrointestinal: Positive for " abdominal pain and nausea. Negative for constipation, diarrhea and vomiting.   Genitourinary: Negative for dysuria and urgency.   Musculoskeletal: Positive for joint pain (left hip). Negative for falls and myalgias.   Neurological: Negative for dizziness, tingling, loss of consciousness, weakness and headaches.   Psychiatric/Behavioral: Negative for depression and suicidal ideas.   All other systems reviewed and are negative.      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    Surgical History   has a past surgical history that includes hysterectomy, vaginal (2006); thyroid lobectomy (1973); lumpectomy (1976, 2005); cervical disk and fusion anterior (03/12/08); tonsillectomy (1963); cervical fusion posterior (1/16/2009); hardware removal neuro (1/16/2009); neck exploration (1/16/2009); abdominal hysterectomy total; lumpectomy; lumbar laminectomy diskectomy (Right, 5/10/2016); shoulder decompression arthroscopic (6/17/08); clavicle distal excision (6/17/08); shoulder arthroscopy w/ rotator cuff repair (10/9/08); pr njx aa&/strd tfrml epi lumbar/sacral 1 level (Right, 8/31/2016); spinal cord stimulator (N/A, 10/26/2018); thoracic laminectomy (N/A, 10/26/2018); appendectomy (2004); pr implant neurostim/ (N/A, 12/16/2019); irrigation & debridement neuro (1/19/2020); and cath placement (1/25/2020).     Family History  family history includes Cancer in her  father; Hypertension in her mother.   Family history reviewed with patient. There is no family history that is pertinent to the chief complaint.     Social History   reports that she has been smoking cigarettes. She has a 30.00 pack-year smoking history. She has never used smokeless tobacco. She reports current alcohol use. She reports that she does not use drugs.    Allergies  Allergies   Allergen Reactions   • Ativan Unspecified      Extreme Restlessness with whole body  [id number]   • Tape      Blisters, paper tape is ok       Medications  Prior to Admission Medications   Prescriptions Last Dose Informant Patient Reported? Taking?   ACETAMINOPHEN EXTRA STRENGTH 500 MG Tab   Yes No   Sig: TAKE 1 2 TABLETS BY MOUTH TWICE A DAY AS NEEDED   Cholecalciferol (VITAMIN D3 PO)  Patient Yes No   Sig: Take 5,000 Units by mouth 2 Times a Day.   Continuous Blood Gluc Sensor (FREESTYLE PARISA 14 DAY SENSOR) Mis  Patient Yes No   Si MISCELLANEOUS E10.42 CHANGE SENSOR EVERY 14 DAYS   E11.65   SYNTHROID 200 MCG Tab   Yes No   Sig: Take 200 mcg by mouth every day.   SYNTHROID 25 MCG Tab   Yes No   amLODIPine (NORVASC) 10 MG Tab   Yes No   Sig: Take 5 mg by mouth. N THE EVENING   aspirin 81 MG EC tablet  Patient No No   Sig: Take 1 Tab by mouth every morning.   atorvastatin (LIPITOR) 40 MG Tab  Patient No No   Sig: Take 1 Tab by mouth every evening.   insulin aspart (NOVOLOG) 100 UNIT/ML Solution  Patient Yes No   Sig: Inject 1-5 Units under the skin 3 times a day before meals. 1 units for every 5 g of carbs   AND  Sliding Scale   150 - 200 = 1 units  201 - 250 = 2 units  251 - 300 = 3 units  301 - 350 = 4 units  351 - 400 = 5 units   lidocaine (LIDODERM) 5 % Patch   No No   Sig: Place 1 Patch on the skin every 24 hours.   metoprolol (TOPROL-XL) 200 MG XL tablet   No No   Sig: Take 1 tablet by mouth every day.   traZODone (DESYREL) 100 MG Tab  Patient Yes No   Sig: Take 100 mg by mouth at bedtime as needed for Sleep.       Facility-Administered Medications: None       Physical Exam  Temp:  [37.1 °C (98.7 °F)] 37.1 °C (98.7 °F)  Pulse:  [100-119] 100  Resp:  [18-19] 19  BP: (127-143)/(75-88) 143/88  SpO2:  [96 %-99 %] 99 %  Blood Pressure: 143/88   Temperature: 37.1 °C (98.7 °F)   Pulse: 100   Respiration: 19   Pulse Oximetry: 99 %       Physical Exam  Vitals and nursing note reviewed.   Constitutional:       General: She is not in acute distress.     Appearance: She is well-developed. She is ill-appearing. She is not diaphoretic.   HENT:      Head: Normocephalic and atraumatic.      Right Ear: External ear normal.      Left Ear: External ear normal.      Nose: Nose normal. No congestion or rhinorrhea.      Mouth/Throat:      Mouth: Mucous membranes are dry.      Pharynx: No oropharyngeal exudate.   Eyes:      General: Scleral icterus present.         Right eye: No discharge.         Left eye: No discharge.   Neck:      Trachea: No tracheal deviation.   Cardiovascular:      Rate and Rhythm: Normal rate and regular rhythm.      Heart sounds: No murmur heard.   No friction rub. No gallop.    Pulmonary:      Effort: Pulmonary effort is normal. No respiratory distress.      Breath sounds: Normal breath sounds. No stridor. No wheezing or rales.   Chest:      Chest wall: No tenderness.   Abdominal:      General: Bowel sounds are normal. There is no distension.      Palpations: Abdomen is soft.      Tenderness: There is no abdominal tenderness.   Musculoskeletal:         General: No tenderness. Normal range of motion.      Cervical back: Normal range of motion and neck supple.      Right lower leg: No edema.      Left lower leg: No edema.   Lymphadenopathy:      Cervical: No cervical adenopathy.   Skin:     General: Skin is warm and dry.      Coloration: Skin is jaundiced.      Findings: No erythema or rash.   Neurological:      General: No focal deficit present.      Mental Status: She is alert and oriented to person, place, and time.       Cranial Nerves: No cranial nerve deficit.   Psychiatric:         Mood and Affect: Mood normal.         Behavior: Behavior normal.         Thought Content: Thought content normal.         Judgment: Judgment normal.         Laboratory:  Recent Labs     09/22/21 2013   WBC 9.9   RBC 3.59*   HEMOGLOBIN 11.3*   HEMATOCRIT 35.6*   MCV 99.2*   MCH 31.5   MCHC 31.7*   RDW 51.9*   PLATELETCT 307   MPV 11.0     Recent Labs     09/22/21 2013   SODIUM 131*   POTASSIUM 3.8   CHLORIDE 96   CO2 23   GLUCOSE 259*   BUN 19   CREATININE 0.94   CALCIUM 8.6     Recent Labs     09/22/21 2013   ALTSGPT 280*   ASTSGOT 326*   ALKPHOSPHAT 2541*   TBILIRUBIN 7.7*   LIPASE 20   GLUCOSE 259*     Recent Labs     09/22/21 2013   APTT 27.3   INR 1.01     No results for input(s): NTPROBNP in the last 72 hours.      Recent Labs     09/22/21 2013   TROPONINT 34*       Imaging:  DX-CHEST-PORTABLE (1 VIEW)   Final Result         1. No acute cardiopulmonary abnormalities are identified.      US-RUQ   Final Result      1. Mildly echogenic liver, most commonly hepatic steatosis.   2. Cholelithiasis. No sonographic evidence of acute cholecystitis.         CT-ABDOMEN-PELVIS WITH    (Results Pending)       X-Ray:  I have personally reviewed the images and compared with prior images.    Assessment/Plan:  I anticipate this patient will require at least two midnights for appropriate medical management, necessitating inpatient admission.    Elevated liver enzymes- (present on admission)  Assessment & Plan  -All are elevated but significant elevation of her alk phos and total bilirubin, significant increase in these 2 since her last labs 8 days ago  -Imaging does not show a cause  -I am highly concerned for a malignancy, MRI has been ordered  -Repeat CMP in the morning    Elevated troponin- (present on admission)  Assessment & Plan  -No chest pain, I do not think this is cardiac  -Repeat troponin in the morning    Hyponatremia- (present on  admission)  Assessment & Plan  -Mild, likely due to dehydration  -Start IV fluids  -Repeat BMP in the morning    Macrocytic anemia- (present on admission)  Assessment & Plan  -No sign of gross bleeding  -Repeat CBC in the morning    Hypothyroidism in adult- (present on admission)  Assessment & Plan  -Continue home Synthroid at 225 mcg daily  -Most recent TSH was approximately 2 months ago was normal at 2.19    Hypertension- (present on admission)  Assessment & Plan  -Continue home amlodipine and metoprolol  -Start as needed clonidine, enalapril and labetalol  -Adjust as needed    Left hip pain- (present on admission)  Assessment & Plan  -I think this is unlikely to be metastatic disease considering it has a burning sensation down the anterior aspect of her left leg however if it persists or worsens or causes her difficulty ambulating could do additional imaging but no additional work-up at this time    Uncontrolled type 1 diabetes mellitus (HCC)- (present on admission)  Assessment & Plan  -With hyperglycemia  -Start insulin sliding scale  -Adjust as needed      VTE prophylaxis: SCDs/TEDs and enoxaparin ppx

## 2021-09-23 NOTE — CONSULTS
Gastroenterology Consult Note:    MARK Galarza  Date & Time note created:    9/23/2021   4:01 PM     Referring MD:  Dr. Marianna Landis    Patient ID:  Name:             Anu Manuel   YOB: 1955  Age:                 66 y.o.  female   MRN:               [id number]                                                             Reason for Consult:      1. Jaundice  2. Lower abdominal pain  3. Unintentional weight loss    History of Present Illness:    This is a 66-year-old female, PMH type 1 diabetes, hypertension, hypothyroidism s/p thyroidectomy, ulcerative colitis, hyperlipidemia, depression/anxiety, CAD s/p 2 drug-eluting stents to LAD 2020, who was admitted to the hospital September 22, 2021 with worsening jaundice, jon colored stools, dark-colored urine, lower abdominal pain/cramping.  Over the past 2-3 weeks, patient has noticed changes in stool and urine color.  Her urine became a dark-colored and noticed jon colored stool-mushy up to 4 times per day.  Over the past 2-3 days, she began to notice yellowing of her skin and eyes.  She has been experiencing lower abdominal pain.  Nausea without vomiting.  Complaints of early satiety. Complaints of pruritus to her upper eyelids and bilateral arms, worse at night.  Over the past 6 months-unintentional weight loss 23 pounds.  Other complaints-right flank pain for over 1 year-this started after her nerve stimulator became infected.  She has noticed that the right flank pain has been getting worse over the past several months.    LFTs   9/23/21: TB: 7.5.  AST: 274.  ALT: 241.  ALP: 2405.  9/22/2021: TB: 7.7.  .  .  ALP 2541.  Hepatitis panel: Nonreactive.  9/14/2021: TB 2.3.  AST: 148.  ALT: 183.  ALP: 1190.    Abdominal ultrasound: 9/22/2021: Mildly echogenic liver, most commonly hepatic steatosis.. Cholelithiasis. No sonographic evidence of acute cholecystitis.    CT scan abdomen/pelvis 9/23/2021: Wall  thickening throughout the entire colon, most in the cecum, descending and sigmoid colon, similar to prior    She started sertraline and Xanax approximately 1 month ago.  Denies any other changes in medications.  Alcohol use: Usually 1 glass of wine every 3 weeks.  Denies heavy use.  No drug use.    Colonoscopy May 18, 2018: Abnormal vascularity, congestion, nodularity and ulceration in the sigmoid colon.  Internal hemorrhoids.  She was started on Lialda 4.8 g daily which she took until 2 months ago then stopped.      Review of Systems:      Review of Systems   Constitutional: Positive for malaise/fatigue and weight loss.   HENT: Negative.    Eyes: Negative.    Respiratory: Negative.    Cardiovascular: Negative.    Gastrointestinal: Positive for abdominal pain, diarrhea and nausea. Negative for blood in stool and melena.   Genitourinary: Negative.    Musculoskeletal: Positive for back pain.   Neurological: Positive for dizziness and weakness.   Psychiatric/Behavioral: Positive for depression.             Physical Exam:  Vitals/ General Appearance:   Weight/BMI: Body mass index is 21.03 kg/m².    Vitals:    09/22/21 2312 09/23/21 0214 09/23/21 0512 09/23/21 1100   BP: 143/88 160/77 137/69 122/66   Pulse: 100 90 88 71   Resp: 19 18 17 17   Temp:  36.8 °C (98.2 °F) 36.6 °C (97.9 °F) 36.7 °C (98 °F)   TempSrc:  Oral Temporal Oral   SpO2: 99% 96% 94% 94%   Weight:       Height:         Oxygen Therapy:  Pulse Oximetry: 94 %, O2 (LPM): 0, O2 Delivery Device: None - Room Air    Physical Exam  Vitals and nursing note reviewed.   Constitutional:       Appearance: She is ill-appearing.      Comments: Jaundice   HENT:      Head: Normocephalic and atraumatic.      Right Ear: Tympanic membrane normal.      Left Ear: Tympanic membrane normal.      Nose: Nose normal.      Mouth/Throat:      Mouth: Mucous membranes are dry.   Eyes:      General: Scleral icterus present.      Extraocular Movements: Extraocular movements intact.       "Pupils: Pupils are equal, round, and reactive to light.   Cardiovascular:      Rate and Rhythm: Normal rate and regular rhythm.      Pulses: Normal pulses.      Heart sounds: Normal heart sounds.   Pulmonary:      Effort: Pulmonary effort is normal.      Breath sounds: Normal breath sounds.   Abdominal:      General: Abdomen is flat. Bowel sounds are normal.      Palpations: Abdomen is soft.      Tenderness: There is abdominal tenderness. There is no guarding.   Musculoskeletal:         General: Normal range of motion.      Cervical back: Normal range of motion and neck supple.      Right lower leg: No edema.      Left lower leg: No edema.   Skin:     General: Skin is warm and dry.      Coloration: Skin is jaundiced.   Neurological:      General: No focal deficit present.      Mental Status: She is alert and oriented to person, place, and time.   Psychiatric:         Mood and Affect: Mood normal.         Behavior: Behavior normal.         Thought Content: Thought content normal.         Judgment: Judgment normal.         Past Medical History:   Past Medical History:   Diagnosis Date   • Adverse effect of anesthesia     in 2008 \"throat closes up\"\"anxiety\" ?laryngospasm, kept in ICU. Pt states no problems currently 2018.    • Anesthesia     in 2008 \"throat closes up\"\"anxiety\" ?laryngospasm, kept in ICU. Pt states no problems currently 2018.    • Arthritis     right shoulder, hands   • Cigarette smoker quit 2013   • Dental disorder     missing teeth    • depression 8/30/2016    denies depression, states has anxiety and panic attacks   • Diabetes mellitus type 1 (HCC) 1989    insulin   • Encounter for long-term (current) use of insulin ([facility]) 9/25/2013   • Heart burn    • High cholesterol    • Hypertension    • Hypothyroidism, postsurgical 1970   • Indigestion    • Infectious disease      had hepatitis C, tested neg.   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, uncontrolled(250.63) 6/10/2015       Past Surgical History:  Past Surgical History:   Procedure Laterality Date   • CATH PLACEMENT  1/25/2020    Procedure: INSERTION, CATHETER PERM;  Surgeon: Rola Mendoza M.D.;  Location: SURGERY [facility];  Service: General   • IRRIGATION & DEBRIDEMENT NEURO  1/19/2020    Procedure: IRRIGATION AND DEBRIDEMENT, WOUND THORACIC AND LUMBAR;  Surgeon: Ryan Roman M.D.;  Location: [facility];  Service: Neurosurgery   • PB IMPLANT NEUROSTIM/ N/A 12/16/2019    Procedure: EXPLORATION AT THORACIC 8 - 9, REPLACEMENT OF  NEUROSTIMULATOR LEAD;  Surgeon: Ryan Roman M.D.;  Location: [facility];  Service: Neurosurgery   • SPINAL CORD STIMULATOR N/A 10/26/2018    Procedure: SPINAL CORD STIMULATOR;  Surgeon: Ryan Roman M.D.;  Location: [facility];  Service: Neurosurgery   • THORACIC LAMINECTOMY N/A 10/26/2018    Procedure: THORACIC LAMINECTOMY - FOR;  Surgeon: Ryan Roman M.D.;  Location: [facility];  Service: Neurosurgery   • SC NJX AA&/STRD TFRML EPI LUMBAR/SACRAL 1 LEVEL Right 8/31/2016    Procedure: INJ-FORAMEN EPI LUM/SAC SNGL L4-5;  Surgeon: Sukhi Godfrey M.D.;  Location: [facility];  Service: Pain Management   • LUMBAR LAMINECTOMY DISKECTOMY Right 5/10/2016    Procedure: LUMBAR L4-5 HEMILAMINECTOMY DISKECTOMY ;  Surgeon: Arnold Keyes M.D.;  Location: SURGERY [facility];  Service:    • CERVICAL FUSION POSTERIOR  1/16/2009    Performed by TARA CONTRERAS at [facility]   • HARDWARE REMOVAL NEURO  1/16/2009    Performed by TARA CONTRERAS at [facility]   • NECK EXPLORATION  1/16/2009    Performed by TARA CONTRERAS at [facility]   • SHOULDER ARTHROSCOPY W/ ROTATOR CUFF REPAIR  10/9/08     Performed by SHERLY CASTANEDA at SURGERY [facility]   • SHOULDER DECOMPRESSION ARTHROSCOPIC  6/17/08    Performed by SHERLY CASTANEDA at SURGERY [facility]   • CLAVICLE DISTAL EXCISION  6/17/08    Performed by SHERLY CASTANEDA at [facility]   • CERVICAL DISK AND FUSION ANTERIOR  03/12/08   • HYSTERECTOMY, VAGINAL  2006   • APPENDECTOMY  2004   • THYROID LOBECTOMY  1973   • TONSILLECTOMY  1963   • ABDOMINAL HYSTERECTOMY TOTAL     • LUMPECTOMY  1976, 2005    Breast    • LUMPECTOMY         Hospital Medications:    Current Facility-Administered Medications:   •  amLODIPine (NORVASC) tablet 5 mg, 5 mg, Oral, Q DAY, Nilesh Singletary D.O., 5 mg at 09/23/21 0544  •  aspirin EC (ECOTRIN) tablet 81 mg, 81 mg, Oral, QAM, Nilesh Singletary D.O., 81 mg at 09/23/21 0544  •  metoprolol SR (TOPROL XL) tablet 200 mg, 200 mg, Oral, DAILY, MIRANDA Florez.O., 200 mg at 09/23/21 0543  •  levothyroxine (SYNTHROID) tablet 200 mcg, 200 mcg, Oral, QDAY, MIRANDA Florez.O., 200 mcg at 09/23/21 0546  •  levothyroxine (SYNTHROID) tablet 25 mcg, 25 mcg, Oral, AM ES, Nilesh Singletary D.O., 25 mcg at 09/23/21 0544  •  senna-docusate (PERICOLACE or SENOKOT S) 8.6-50 MG per tablet 2 Tablet, 2 Tablet, Oral, BID **AND** polyethylene glycol/lytes (MIRALAX) PACKET 1 Packet, 1 Packet, Oral, QDAY PRN **AND** magnesium hydroxide (MILK OF MAGNESIA) suspension 30 mL, 30 mL, Oral, QDAY PRN **AND** bisacodyl (DULCOLAX) suppository 10 mg, 10 mg, Rectal, QDAY PRN, MIRANDA Florez.O.  •  NS infusion, , Intravenous, Continuous, RODRICK FlorezO., Last Rate: 100 mL/hr at 09/23/21 0231, New Bag at 09/23/21 0231  •  enoxaparin (LOVENOX) inj 40 mg, 40 mg, Subcutaneous, DAILY, RODRICK FlorezO., 40 mg at 09/23/21 0609  •  cloNIDine (CATAPRES) tablet 0.1 mg, 0.1 mg, Oral, Q6HRS PRN, Nilesh Singletary D.O.  •  enalaprilat (Vasotec) injection 1.25 mg 1 mL, 1.25 mg, Intravenous, Q6HRS PRN, Nilesh Singletary D.O.  •  labetalol  (NORMODYNE/TRANDATE) injection 10 mg, 10 mg, Intravenous, Q4HRS PRN, Nilesh Singletary D.O.  •  ondansetron (ZOFRAN) syringe/vial injection 4 mg, 4 mg, Intravenous, Q4HRS PRN, RODRICK FlorezO.  •  ondansetron (ZOFRAN ODT) dispertab 4 mg, 4 mg, Oral, Q4HRS PRN, RODRICK FlorezO.  •  promethazine (PHENERGAN) tablet 12.5-25 mg, 12.5-25 mg, Oral, Q4HRS PRN, RODRICK FlorezO.  •  promethazine (PHENERGAN) suppository 12.5-25 mg, 12.5-25 mg, Rectal, Q4HRS PRN, RODRICK FlorezO.  •  prochlorperazine (COMPAZINE) injection 5-10 mg, 5-10 mg, Intravenous, Q4HRS PRN, RODRICK FlorezO.  •  insulin regular (HumuLIN R,NovoLIN R) injection, 2-9 Units, Subcutaneous, 4X/DAY ACHS, 9 Units at 09/23/21 0608 **AND** POC blood glucose manual result, , , Q AC AND BEDTIME(S) **AND** NOTIFY MD and PharmD, , , Once **AND** glucose 4 g chewable tablet 16 g, 16 g, Oral, Q15 MIN PRN **AND** dextrose 50% (D50W) injection 50 mL, 50 mL, Intravenous, Q15 MIN PRN, Nilesh Singletary D.O.  •  ALPRAZolam (XANAX) tablet 0.5 mg, 0.5 mg, Oral, TID PRN, RODRICK FlorezO., 0.5 mg at 09/23/21 0251  •  insulin glargine (Semglee) injection, 15 Units, Subcutaneous, Q EVENING, MARK Rowley    Current Outpatient Medications:  Current Facility-Administered Medications   Medication Dose Route Frequency Provider Last Rate Last Admin   • amLODIPine (NORVASC) tablet 5 mg  5 mg Oral Q DAY RODRICK FlorezO.   5 mg at 09/23/21 0544   • aspirin EC (ECOTRIN) tablet 81 mg  81 mg Oral QAM RODRICK FlorezOChey   81 mg at 09/23/21 0544   • metoprolol SR (TOPROL XL) tablet 200 mg  200 mg Oral DAILY RODRICK FlorezOChey   200 mg at 09/23/21 0543   • levothyroxine (SYNTHROID) tablet 200 mcg  200 mcg Oral QDAY Nilesh Singletary D.O.   200 mcg at 09/23/21 0546   • levothyroxine (SYNTHROID) tablet 25 mcg  25 mcg Oral AM ES RODRICK FlorezO.   25 mcg at 09/23/21 0544   • senna-docusate (PERICOLACE or SENOKOT S) 8.6-50 MG per tablet 2 Tablet  2  Tablet Oral BID Nilesh Singletary D.O.        And   • polyethylene glycol/lytes (MIRALAX) PACKET 1 Packet  1 Packet Oral QDAY PRN Nilesh Singletary D.O.        And   • magnesium hydroxide (MILK OF MAGNESIA) suspension 30 mL  30 mL Oral QDAY PRN Nilesh Singletary D.O.        And   • bisacodyl (DULCOLAX) suppository 10 mg  10 mg Rectal QDAY PRN RODRICK FlorezO.       • NS infusion   Intravenous Continuous RODRICK FlorezO. 100 mL/hr at 09/23/21 0231 New Bag at 09/23/21 0231   • enoxaparin (LOVENOX) inj 40 mg  40 mg Subcutaneous DAILY RODRICK FlorezO.   40 mg at 09/23/21 0609   • cloNIDine (CATAPRES) tablet 0.1 mg  0.1 mg Oral Q6HRS PRN RODRICK FlorezO.       • enalaprilat (Vasotec) injection 1.25 mg 1 mL  1.25 mg Intravenous Q6HRS PRN RODRICK FlorezO.       • labetalol (NORMODYNE/TRANDATE) injection 10 mg  10 mg Intravenous Q4HRS PRN RODRICK FlorezO.       • ondansetron (ZOFRAN) syringe/vial injection 4 mg  4 mg Intravenous Q4HRS PRN RODRICK FlorezO.       • ondansetron (ZOFRAN ODT) dispertab 4 mg  4 mg Oral Q4HRS PRN RODRICK FlorezO.       • promethazine (PHENERGAN) tablet 12.5-25 mg  12.5-25 mg Oral Q4HRS PRN RODRICK FlorezO.       • promethazine (PHENERGAN) suppository 12.5-25 mg  12.5-25 mg Rectal Q4HRS PRN RODRICK FlorezO.       • prochlorperazine (COMPAZINE) injection 5-10 mg  5-10 mg Intravenous Q4HRS PRN RODRICK FlorezO.       • insulin regular (HumuLIN R,NovoLIN R) injection  2-9 Units Subcutaneous 4X/DAY ACHS RODRICK FlorezOChey   9 Units at 09/23/21 0608    And   • glucose 4 g chewable tablet 16 g  16 g Oral Q15 MIN PRN Nilesh Singletary D.O.        And   • dextrose 50% (D50W) injection 50 mL  50 mL Intravenous Q15 MIN PRN Nilesh Singletary D.O.       • ALPRAZolam (XANAX) tablet 0.5 mg  0.5 mg Oral TID PRN Nilesh Singletary D.O.   0.5 mg at 09/23/21 0251   • insulin glargine (Semglee) injection  15 Units Subcutaneous Q EVENING MARK Rowley      "      Medication Allergy:  Allergies   Allergen Reactions   • Ativan Unspecified      Extreme Restlessness with whole body  [id number]   • Tape      Blisters, paper tape is ok       Family History:  Family History   Problem Relation Age of Onset   • Hypertension Mother    • Cancer Father        Social History:  Social History     Socioeconomic History   • Marital status:      Spouse name: Not on file   • Number of children: Not on file   • Years of education: Not on file   • Highest education level: Not on file   Occupational History   • Not on file   Tobacco Use   • Smoking status: Current Every Day Smoker     Packs/day: 1.00     Years: 30.00     Pack years: 30.00     Types: Cigarettes   • Smokeless tobacco: Never Used   • Tobacco comment: 1 ppd    Vaping Use   • Vaping Use: Never used   Substance and Sexual Activity   • Alcohol use: Yes     Comment:  \"maybe once a month I'll have some wine\".    • Drug use: No   • Sexual activity: Not on file   Other Topics Concern   • Not on file   Social History Narrative   • Not on file     Social Determinants of Health     Financial Resource Strain:    • Difficulty of Paying Living Expenses:    Food Insecurity:    • Worried About Running Out of Food in the Last Year:    • Ran Out of Food in the Last Year:    Transportation Needs:    • Lack of Transportation (Medical):    • Lack of Transportation (Non-Medical):    Physical Activity:    • Days of Exercise per Week:    • Minutes of Exercise per Session:    Stress:    • Feeling of Stress :    Social Connections:    • Frequency of Communication with Friends and Family:    • Frequency of Social Gatherings with Friends and Family:    • Attends Mandae[employer] Services:    • Active Member of Clubs or Organizations:    • Attends Club or Organization Meetings:    • Marital Status:    Intimate Partner Violence:    • Fear of Current or Ex-Partner:    • Emotionally Abused:    • Physically Abused:    • Sexually Abused:          MDM (Data " Review):     Records reviewed and summarized in current documentation    Lab Data Review:  Recent Results (from the past 24 hour(s))   CBC WITH DIFFERENTIAL    Collection Time: 09/22/21  8:13 PM   Result Value Ref Range    WBC 9.9 4.8 - 10.8 K/uL    RBC 3.59 (L) 4.20 - 5.40 M/uL    Hemoglobin 11.3 (L) 12.0 - 16.0 g/dL    Hematocrit 35.6 (L) 37.0 - 47.0 %    MCV 99.2 (H) 81.4 - 97.8 fL    MCH 31.5 27.0 - 33.0 pg    MCHC 31.7 (L) 33.6 - 35.0 g/dL    RDW 51.9 (H) 35.9 - 50.0 fL    Platelet Count 307 164 - 446 K/uL    MPV 11.0 9.0 - 12.9 fL    Neutrophils-Polys 73.50 (H) 44.00 - 72.00 %    Lymphocytes 11.30 (L) 22.00 - 41.00 %    Monocytes 12.10 0.00 - 13.40 %    Eosinophils 1.60 0.00 - 6.90 %    Basophils 0.90 0.00 - 1.80 %    Immature Granulocytes 0.60 0.00 - 0.90 %    Nucleated RBC 0.00 /100 WBC    Neutrophils (Absolute) 7.29 (H) 2.00 - 7.15 K/uL    Lymphs (Absolute) 1.12 1.00 - 4.80 K/uL    Monos (Absolute) 1.20 (H) 0.00 - 0.85 K/uL    Eos (Absolute) 0.16 0.00 - 0.51 K/uL    Baso (Absolute) 0.09 0.00 - 0.12 K/uL    Immature Granulocytes (abs) 0.06 0.00 - 0.11 K/uL    NRBC (Absolute) 0.00 K/uL   COMP METABOLIC PANEL    Collection Time: 09/22/21  8:13 PM   Result Value Ref Range    Sodium 131 (L) 135 - 145 mmol/L    Potassium 3.8 3.6 - 5.5 mmol/L    Chloride 96 96 - 112 mmol/L    Co2 23 20 - 33 mmol/L    Anion Gap 12.0 7.0 - 16.0    Glucose 259 (H) 65 - 99 mg/dL    Bun 19 8 - 22 mg/dL    Creatinine 0.94 0.50 - 1.40 mg/dL    Calcium 8.6 8.4 - 10.2 mg/dL    AST(SGOT) 326 (H) 12 - 45 U/L    ALT(SGPT) 280 (H) 2 - 50 U/L    Alkaline Phosphatase 2541 (H) 30 - 99 U/L    Total Bilirubin 7.7 (H) 0.1 - 1.5 mg/dL    Albumin 3.4 3.2 - 4.9 g/dL    Total Protein 7.0 6.0 - 8.2 g/dL    Globulin 3.6 (H) 1.9 - 3.5 g/dL    A-G Ratio 0.9 g/dL   LIPASE    Collection Time: 09/22/21  8:13 PM   Result Value Ref Range    Lipase 20 7 - 58 U/L   MAGNESIUM    Collection Time: 09/22/21  8:13 PM   Result Value Ref Range    Magnesium 1.8 1.5 - 2.5  mg/dL   PROTHROMBIN TIME    Collection Time: 09/22/21  8:13 PM   Result Value Ref Range    PT 12.5 12.0 - 14.6 sec    INR 1.01 0.87 - 1.13   APTT    Collection Time: 09/22/21  8:13 PM   Result Value Ref Range    APTT 27.3 24.7 - 36.0 sec   TROPONIN    Collection Time: 09/22/21  8:13 PM   Result Value Ref Range    Troponin T 34 (H) 6 - 19 ng/L   BLOOD CULTURE    Collection Time: 09/22/21  8:13 PM    Specimen: Peripheral; Blood   Result Value Ref Range    Significant Indicator NEG     Source BLD     Site PERIPHERAL     Culture Result       No Growth  Note: Blood cultures are incubated for 5 days and  are monitored continuously.Positive blood cultures  are called to the RN and reported as soon as  they are identified.  Blood culture testing and Gram stain, if indicated, are  performed at [facility], [address] [place] [address].  Positive blood cultures are  sent to [facility], [address] [place] [address], for organism identification and  susceptibility testing.     HEPATITIS PANEL ACUTE(4 COMPONENTS)    Collection Time: 09/22/21  8:13 PM   Result Value Ref Range    Hepatitis B Surface Antigen Non-Reactive Non-Reactive    Hepatitis B Cors Ab,IgM Non-Reactive Non-Reactive    Hepatitis A Virus Ab, IgM Non-Reactive Non-Reactive    Hepatitis C Antibody Non-Reactive Non-Reactive   CRP Quantitative (Non-Cardiac)    Collection Time: 09/22/21  8:13 PM   Result Value Ref Range    Stat C-Reactive Protein 2.85 (H) 0.00 - 0.75 mg/dL   Procalcitonin    Collection Time: 09/22/21  8:13 PM   Result Value Ref Range    Procalcitonin 0.63 (H) <0.25 ng/mL   VENOUS BLOOD GAS    Collection Time: 09/22/21  8:13 PM   Result Value Ref Range    Venous Bg Ph 7.39 7.31 - 7.45    Venous Bg Pco2 38.2 (L) 41.0 - 51.0 mmHg    Venous Bg Po2 23.1 (L) 25.0 - 40.0 mmHg    Venous Bg O2 Saturation 40.7 %    Venous Bg Hco3 23 (L) 24 - 28 mmol/L    Venous Bg Base Excess -2 mmol/L    Body Temp see below  Centigrade   LACTIC ACID    Collection Time: 21  8:13 PM   Result Value Ref Range    Lactic Acid 1.7 0.5 - 2.0 mmol/L   ACETAMINOPHEN    Collection Time: 21  8:13 PM   Result Value Ref Range    Acetaminophen -Tylenol <5.0 (L) 10.0 - 30.0 ug/mL   ESTIMATED GFR    Collection Time: 21  8:13 PM   Result Value Ref Range    GFR If African American >60 >60 mL/min/1.73 m 2    GFR If Non African American 60 >60 mL/min/1.73 m 2   EKG    Collection Time: 21  8:13 PM   Result Value Ref Range    Report       [facility]    Test Date:  2021  Pt Name:    KALPANA BUTTS               Department: [facility]  MRN:        [id number]                      [facility]  Gender:     Female                       Technician: OLAYINKA  :        1955                   Requested By:RODO NAVARRO  Order #:    [id number]                    Reading MD: Rodo Navarro MD    Measurements  Intervals                                Axis  Rate:       96                           P:          54  ME:         153                          QRS:        45  QRSD:       81                           T:          5  QT:         356  QTc:        450    Interpretive Statements  Sinus rhythm  Artifact in lead(s) I,III,aVL,V1,V2,V3,V4,V5,V6  Compared to ECG 2021 15:08:23  No significant changes  No STEMI  Electronically Signed On 2021 23:28:14 PDT by Rodo Navarro MD     URINALYSIS    Collection Time: 21  8:40 PM    Specimen: Urine   Result Value Ref Range    Color Elida     Character Hazy (A)     Specific Gravity >=1.030 <1.035    Ph 5.5 5.0 - 8.0    Glucose 500 (A) Negative mg/dL    Ketones Trace (A) Negative mg/dL    Protein 100 (A) Negative mg/dL    Bilirubin Large (A) Negative    Nitrite Negative Negative    Leukocyte Esterase Negative Negative    Occult Blood Trace (A) Negative    Micro Urine Req Microscopic    BLOOD CULTURE    Collection Time: 21  8:40 PM     Specimen: Peripheral; Blood   Result Value Ref Range    Significant Indicator NEG     Source BLD     Site PERIPHERAL     Culture Result       No Growth  Note: Blood cultures are incubated for 5 days and  are monitored continuously.Positive blood cultures  are called to the RN and reported as soon as  they are identified.  Blood culture testing and Gram stain, if indicated, are  performed at [facility], [address] [place] [address].  Positive blood cultures are  sent to [facility], [address] [place] [address], for organism identification and  susceptibility testing.     URINE MICROSCOPIC (W/UA)    Collection Time: 09/22/21  8:40 PM   Result Value Ref Range    WBC 2-5 /hpf    RBC 2-5 (A) /hpf    Bacteria Few (A) None /hpf    Epithelial Cells Few Few /hpf    Mucous Threads Few /hpf    Ca Oxalate Crystal Few /hpf    Hyaline Cast 0-2 /lpf   AMMONIA    Collection Time: 09/22/21 11:26 PM   Result Value Ref Range    Ammonia 26 11 - 45 umol/L   TROPONIN    Collection Time: 09/23/21  1:40 AM   Result Value Ref Range    Troponin T 31 (H) 6 - 19 ng/L   CBC without Differential    Collection Time: 09/23/21  5:00 AM   Result Value Ref Range    WBC 9.3 4.8 - 10.8 K/uL    RBC 3.39 (L) 4.20 - 5.40 M/uL    Hemoglobin 10.6 (L) 12.0 - 16.0 g/dL    Hematocrit 33.0 (L) 37.0 - 47.0 %    MCV 97.3 81.4 - 97.8 fL    MCH 31.3 27.0 - 33.0 pg    MCHC 32.1 (L) 33.6 - 35.0 g/dL    RDW 50.4 (H) 35.9 - 50.0 fL    Platelet Count 286 164 - 446 K/uL    MPV 11.4 9.0 - 12.9 fL   Comp Metabolic Panel (CMP)    Collection Time: 09/23/21  5:00 AM   Result Value Ref Range    Sodium 130 (L) 135 - 145 mmol/L    Potassium 4.3 3.6 - 5.5 mmol/L    Chloride 96 96 - 112 mmol/L    Co2 20 20 - 33 mmol/L    Anion Gap 14.0 7.0 - 16.0    Glucose 422 (H) 65 - 99 mg/dL    Bun 16 8 - 22 mg/dL    Creatinine 0.74 0.50 - 1.40 mg/dL    Calcium 8.2 (L) 8.4 - 10.2 mg/dL    AST(SGOT) 274 (H) 12 - 45 U/L    ALT(SGPT) 241 (H)  2 - 50 U/L    Alkaline Phosphatase 2405 (H) 30 - 99 U/L    Total Bilirubin 7.5 (H) 0.1 - 1.5 mg/dL    Albumin 2.6 (L) 3.2 - 4.9 g/dL    Total Protein 5.9 (L) 6.0 - 8.2 g/dL    Globulin 3.3 1.9 - 3.5 g/dL    A-G Ratio 0.8 g/dL   TROPONIN    Collection Time: 09/23/21  5:00 AM   Result Value Ref Range    Troponin T 34 (H) 6 - 19 ng/L   ESTIMATED GFR    Collection Time: 09/23/21  5:00 AM   Result Value Ref Range    GFR If African American >60 >60 mL/min/1.73 m 2    GFR If Non African American >60 >60 mL/min/1.73 m 2   HEMOGLOBIN A1C    Collection Time: 09/23/21  5:00 AM   Result Value Ref Range    Glycohemoglobin 8.5 (H) 4.0 - 5.6 %    Est Avg Glucose 197 mg/dL   POCT glucose device results    Collection Time: 09/23/21  5:51 AM   Result Value Ref Range    Glucose - Accu-Ck 419 (HH) 65 - 99 mg/dL   POCT glucose device results    Collection Time: 09/23/21 11:08 AM   Result Value Ref Range    Glucose - Accu-Ck 146 (H) 65 - 99 mg/dL   Renal Function Panel    Collection Time: 09/23/21  2:03 PM   Result Value Ref Range    Sodium 131 (L) 135 - 145 mmol/L    Potassium 4.6 3.6 - 5.5 mmol/L    Chloride 99 96 - 112 mmol/L    Co2 21 20 - 33 mmol/L    Glucose 295 (H) 65 - 99 mg/dL    Creatinine 0.67 0.50 - 1.40 mg/dL    Bun 16 8 - 22 mg/dL    Calcium 8.0 (L) 8.4 - 10.2 mg/dL    Phosphorus 2.4 (L) 2.5 - 4.5 mg/dL    Albumin 2.5 (L) 3.2 - 4.9 g/dL   MAGNESIUM    Collection Time: 09/23/21  2:03 PM   Result Value Ref Range    Magnesium 1.8 1.5 - 2.5 mg/dL   ESTIMATED GFR    Collection Time: 09/23/21  2:03 PM   Result Value Ref Range    GFR If African American >60 >60 mL/min/1.73 m 2    GFR If Non African American >60 >60 mL/min/1.73 m 2       Imaging/Procedures Review:      CT-ABDOMEN-PELVIS WITH   Final Result         1. Wall thickening throughout the entire colon, most in the cecum, descending and sigmoid colon, similar to prior      DX-CHEST-PORTABLE (1 VIEW)   Final Result         1. No acute cardiopulmonary abnormalities are  identified.      US-RUQ   Final Result      1. Mildly echogenic liver, most commonly hepatic steatosis.   2. Cholelithiasis. No sonographic evidence of acute cholecystitis.         MR-ABDOMEN-WITH & W/O    (Results Pending)        MDM (Assessment and Plan):     Patient Active Problem List    Diagnosis Date Noted   • Elevated liver enzymes 09/23/2021   • Macrocytic anemia 09/23/2021   • Hyponatremia 09/23/2021   • Elevated troponin 09/23/2021   • Pain in the chest 08/17/2020   • CAD S/P percutaneous coronary angioplasty 08/11/2020   • Hypothyroidism in adult 08/10/2020   • CKD (chronic kidney disease) stage 3, GFR 30-59 ml/min ([facility]) 06/23/2020   • Dyslipidemia 06/23/2020   • GERD (gastroesophageal reflux disease) 06/23/2020   • Lung nodule 06/23/2020   • Hemoptysis 06/23/2020   • Anxiety 01/25/2020   • Nausea & vomiting 01/25/2020   • Hypertension 01/24/2020   • Hypoglycemia 01/22/2020   • RHEA (acute kidney injury) ([facility]) 01/21/2020   • Post-operative wound abscess 01/18/2020   • Tobacco abuse 01/18/2020   • Cellulitis 03/15/2018   • Left hip pain 03/15/2018   • Acute left lumbar radiculopathy 08/31/2016   • Lumbar spinal stenosis 05/10/2016   • Type 1 diabetes mellitus with neurological manifestations, uncontrolled ([facility]) 06/10/2015   • Diabetic polyneuropathy ([facility]) 06/10/2015   • Vertigo 04/08/2015   • Uncontrolled type 1 diabetes mellitus ([facility]) 09/25/2013   • Encounter for long-term (current) use of insulin ([facility]) 09/25/2013   • Accidental drug overdose 08/27/2012   • Vitamin d deficiency 03/14/2012   • Hypothyroidism, postsurgical    • Type 1 diabetes mellitus with kidney complication (HCC)    • Cigarette smoker      This is a 66-year-old female, admitted to the hospital with progressive jaundice over the past 2-3 days, jon colored stool, dark-colored urine over the past 3 weeks with lower abdominal pain.  Unintentional weight loss 28 pounds in the past 6 months.  Complains of early satiety, nausea without  vomiting.  Typically, has 3-4 loose, jon colored stools per day.  Complaints of profound fatigue and weakness. Pruritus of upper eyelids and bilateral arms worse at night.  Labs 9/23/21: TB: 7.5.  AST: 274.  ALT: 241.  ALP: 2405.  Lipase: 20.  Abdominal ultrasound revealed cholelithiasis.  No evidence of cholecystitis.  Hepatic steatosis.  CT scan abdomen/pelvis: Wall thickening throughout most of the colon, worse in the cecum, descending, sigmoid.  She was started on sertraline and Xanax 1 month ago.  No other new medications.  No excessive alcohol or Tylenol use.  Denies fevers, chills, vomiting, hematochezia, melena.    ASSESSMENT:  1. Jaundice: Total bilirubin 7.5  2.  Lower abdominal pain-CT scan revealed wall thickening of the colon.  3.  Elevated LFTs worse over the past 2 weeks. Due to her history of UC, will need to evaluate for PSC vs genetic vs viral etiologies. Will need to rule out neoplasm.   4.  Nausea without vomiting  5.  Type 1 diabetes  6.  Unintentional weight loss  7.  Ulcerative colitis    PLAN:  1. MRI abdomen/pelvis for further evaluation of liver/pancreas  2.  Labs: AFP, CEA, CA 19-9, SCARLETT, ASMA, AMA, GGT, IgM  3.  Likely will need a colonoscopy/EGD during hospitalization  4.   Diabetic diet  5.  Labs: CBC, CMP tomorrow  6. IV fluids, pain medication, antiemetics per primary team      Thank your for the opportunity to assist in the care of your patient.  Please call for any questions or concerns.    [email or web address] [email or web address].

## 2021-09-23 NOTE — ASSESSMENT & PLAN NOTE
Significant elevation in alk phos and total bilirubin is previous blood work on 9/14/2021  --> 326-> 274-->257--> 226  --> 280--> 241--> 233-->198  Alk phos 1190--> 2541--> 2405--> 2594--> 2424  TBili 2.3--> 7.7--> 7.5-->8.4-->8.6  Ammonia 26-->48. No neurological symptoms.    Imaging unclear etiology  MRI negative for hepatic or pancreatic mass or malignancy  Seen by GI   Recommended AFP, CEA, CA 19-9, SCARLETT, ASMA, AMA, GGT, IgM and possible colonoscopy/EGD during hospitalization   MRCP pending  Continue to monitor

## 2021-09-23 NOTE — PROGRESS NOTES
Pt arrived via gurney, admitted to room 215-2 from ER   Pt is A&Ox4, pt stated that abdominal pain/discomfort is tolerable at this moment and did not request for any pain meds  IVF - NS running at 100mL/hr  MRI questionnaires completed; pt has an implanted glucometer to her L-arm   Assessment completed; generalized jaundice noted - especially to bilateral scleras; denied any itching   Oriented to room call light and smoking policy.   Reviewed plan of care with the patient and the family.   Fall precaution in place.   Bed locked & at lowest position.   Call light within reach.   Continue to monitor.    Skin assessment completed; pt has implanted glucometer to L-arm. Skin remains intact otherwise

## 2021-09-23 NOTE — ASSESSMENT & PLAN NOTE
-Continue home amlodipine and metoprolol  -Start as needed clonidine, enalapril and labetalol  -Adjust as needed

## 2021-09-23 NOTE — PROGRESS NOTES
Hospital Medicine Daily Progress Note    Date of Service  9/23/2021    Chief Complaint  Anu Manuel is a 66 y.o. female admitted 9/22/2021 worsening jaundice and lower abdominal pain    Hospital Course  This is a 66-year-old female with a past medical history of hypertension, type 1 diabetes mellitus, surgical hypothyroidism admitted 9/22/2021 for worsening jaundice and lower abdominal pain.  She reports nausea, diarrhea, and mild intermittent cramping, generalized abdominal pain has been waxing and waning for approximately 3-4 weeks.  She saw her primary care provider who referred her to Dr. Zavala from gastroenterology whom she has seen in the past.  She had initial lab work as well as a CT abdomen which showed colitis and has appointment of follow-up Dr. Zavala in 11/2021. Per patient, colonoscopy in recent years showed ulceration of colon. She followed up with her primary care provider on 9/22/2021 who noticed patient's jaundice worsening and was referred to the emergency department for further evaluation.  Lab work is significant for , , alk phos of 8/25/1939, total bilirubin 7.7.  US-RUQ 9/22/2021 showed likely hepatic steatosis and cholelithiasis but no evidence of acute cholecystitis. CT abdomen pelvis showed wall thickening throughout the entire colon anomaly in the cecum, descending, and sigmoid colon-similar to patient's prior CT scan.  MRI abdomen pending.    Interval Problem Update  9/23/2021: Patient reports continued diarrhea but denies any abdominal pain at this time, nausea, vomiting. MRI abdomen pending.     I have personally seen and examined the patient at bedside. I discussed the plan of care with patient, bedside RN, GI and Dr. Landis.    Consultants/Specialty  GI    Code Status  Full Code    Disposition  Patient is not medically cleared.   Anticipate discharge to to home with close outpatient follow-up.  I have placed the appropriate orders for post-discharge  needs.    Review of Systems  Review of Systems   Constitutional: Positive for weight loss (23 lbs since 03/2021). Negative for chills, fever and malaise/fatigue.   HENT: Negative for congestion and sore throat.    Respiratory: Negative for cough, hemoptysis, sputum production and shortness of breath.    Cardiovascular: Negative for chest pain, palpitations, orthopnea and leg swelling.   Gastrointestinal: Positive for diarrhea, heartburn and nausea. Negative for abdominal pain, blood in stool, constipation and vomiting.   Genitourinary: Negative for dysuria, flank pain and hematuria.   Musculoskeletal: Negative for falls and myalgias.   Skin: Positive for itching. Negative for rash.   Neurological: Negative for dizziness, sensory change, weakness and headaches.   Psychiatric/Behavioral: Positive for depression. Negative for suicidal ideas. The patient has insomnia (restlessness). The patient is not nervous/anxious.         Physical Exam  Temp:  [36.6 °C (97.9 °F)-37.1 °C (98.7 °F)] 36.7 °C (98 °F)  Pulse:  [] 71  Resp:  [17-19] 17  BP: (122-160)/(66-88) 122/66  SpO2:  [94 %-99 %] 94 %    Physical Exam  Vitals and nursing note reviewed.   Constitutional:       General: She is not in acute distress.     Appearance: She is normal weight. She is not diaphoretic.   HENT:      Head: Normocephalic and atraumatic.      Nose: Nose normal.      Mouth/Throat:      Mouth: Mucous membranes are moist.      Pharynx: Oropharynx is clear.   Eyes:      General: Scleral icterus present.      Extraocular Movements: Extraocular movements intact.      Conjunctiva/sclera: Conjunctivae normal.      Pupils: Pupils are equal, round, and reactive to light.   Cardiovascular:      Rate and Rhythm: Normal rate and regular rhythm.      Pulses: Normal pulses.      Heart sounds: Normal heart sounds. No murmur heard.   No gallop.    Pulmonary:      Effort: Pulmonary effort is normal. No respiratory distress.      Breath sounds: Normal breath  sounds.   Chest:      Chest wall: No tenderness.   Abdominal:      General: Abdomen is flat. Bowel sounds are normal.      Palpations: Abdomen is soft. There is hepatomegaly. There is no splenomegaly.      Tenderness: There is no abdominal tenderness.   Musculoskeletal:         General: No deformity. Normal range of motion.      Cervical back: Normal range of motion and neck supple.      Right lower leg: No edema.      Left lower leg: No edema.   Skin:     General: Skin is warm and dry.      Capillary Refill: Capillary refill takes less than 2 seconds.      Coloration: Skin is jaundiced.      Findings: No bruising.   Neurological:      General: No focal deficit present.      Mental Status: She is alert and oriented to person, place, and time. Mental status is at baseline.   Psychiatric:         Mood and Affect: Mood normal.         Behavior: Behavior normal.         Thought Content: Thought content normal.         Judgment: Judgment normal.         Fluids    Intake/Output Summary (Last 24 hours) at 9/23/2021 1339  Last data filed at 9/23/2021 1000  Gross per 24 hour   Intake 1928 ml   Output --   Net 1928 ml       Laboratory       9/22/2021 20:13 9/23/2021 05:00   WBC 9.9 9.3   RBC 3.59 (L) 3.39 (L)   Hemoglobin 11.3 (L) 10.6 (L)   Hematocrit 35.6 (L) 33.0 (L)   MCV 99.2 (H) 97.3   MCH 31.5 31.3   MCHC 31.7 (L) 32.1 (L)   RDW 51.9 (H) 50.4 (H)   Platelet Count 307 286   MPV 11.0 11.4   Neutrophils-Polys 73.50 (H)    Neutrophils (Absolute) 7.29 (H)    Lymphocytes 11.30 (L)    Lymphs (Absolute) 1.12    Monocytes 12.10    Monos (Absolute) 1.20 (H)    Eosinophils 1.60    Eos (Absolute) 0.16    Basophils 0.90    Baso (Absolute) 0.09    Immature Granulocytes 0.60    Immature Granulocytes (abs) 0.06    Nucleated RBC 0.00    NRBC (Absolute) 0.00         9/22/2021 20:13 9/23/2021 05:00 9/23/2021 14:03 9/24/2021 10:31   Sodium 131 (L) 130 (L) 131 (L) 132 (L)   Potassium 3.8 4.3 4.6 4.1   Chloride 96 96 99 98   Co2 23 20 21 22    Anion Gap 12.0 14.0  12.0   Glucose 259 (H) 422 (H) 295 (H) 182 (H)   Bun 19 16 16 20   Creatinine 0.94 0.74 0.67 0.66   GFR If African American >60 >60 >60 >60   GFR If Non African American 60 >60 >60 >60   Calcium 8.6 8.2 (L) 8.0 (L) 8.5   AST(SGOT) 326 (H) 274 (H)  257 (H)   ALT(SGPT) 280 (H) 241 (H)  233 (H)   Alkaline Phosphatase 2541 (H) 2405 (H)  2594 (H)   Total Bilirubin 7.7 (H) 7.5 (H)  8.4 (H)   Albumin 3.4 2.6 (L) 2.5 (L) 3.1 (L)   Total Protein 7.0 5.9 (L)  6.8   Globulin 3.6 (H) 3.3  3.7 (H)   A-G Ratio 0.9 0.8  0.8      9/22/2021 20:13 9/23/2021 14:03 9/24/2021 10:31   Phosphorus  2.4 (L) 2.9   Magnesium 1.8 1.8 1.7        9/22/2021 20:13   Lipase 20        9/22/2021 23:26   Ammonia 26          9/22/2021 20:13   Lactic Acid 1.7      9/23/2021 05:00   Glycohemoglobin 8.5 (H)        9/22/2021 20:13   Acetaminophen -Tylenol <5.0 (L)        9/22/2021 20:13 9/23/2021 01:40 9/23/2021 05:00   Troponin T 34 (H) 31 (H) 34 (H)        9/22/2021 20:13   PT 12.5   INR 1.01   APTT 27.3        9/22/2021 20:13   Stat C-Reactive Protein 2.85 (H)      9/22/2021 20:13   Procalcitonin 0.63 (H)      9/22/2021 20:13   Hepatitis A Virus Ab, IgM Non-Reactive   Hepatitis B Surface Antigen Non-Reactive   Hepatitis B Cors Ab,IgM Non-Reactive   Hepatitis C Antibody Non-Reactive      9/22/2021 20:40   Color Elida   Character Hazy (A)   Specific Gravity >=1.030   Ph 5.5   Glucose 500 (A)   Ketones Trace (A)   Bilirubin Large (A)   Occult Blood Trace (A)   Protein 100 (A)   Nitrite Negative   Leukocyte Esterase Negative   Micro Urine Req Microscopic   WBC 2-5   RBC 2-5 (A)   Epithelial Cells Few   Bacteria Few (A)   Mucous Threads Few   Ca Oxalate Crystal Few   Hyaline Cast 0-2     Imaging  MR-ABDOMEN-WITH & W/O   Final Result      1.  No hepatic or pancreatic mass demonstrated.   2.  No biliary dilation.            US-LIVER AND VESSELS COMPLETE (COMBO)   Final Result         1. No portal of hepatic vein thrombosis.   2.  Cholelithiasis.   3. Contracted gallbladder with mildly irregular gallbladder wall thickening.      CT-ABDOMEN-PELVIS WITH   Final Result         1. Wall thickening throughout the entire colon, most in the cecum, descending and sigmoid colon, similar to prior      DX-CHEST-PORTABLE (1 VIEW)   Final Result         1. No acute cardiopulmonary abnormalities are identified.      US-RUQ   Final Result      1. Mildly echogenic liver, most commonly hepatic steatosis.   2. Cholelithiasis. No sonographic evidence of acute cholecystitis.              Assessment/Plan  * Elevated liver enzymes- (present on admission)  Assessment & Plan  Significant elevation in alk phos and total bilirubin is previous blood work on 9/14/2021  --> 326-> 274  --> 280--> 241  Alk phos 1190--> 2541--> 2405  TBili 2.3--> 7.7--> 7.5-->    Imaging unclear etiology  MRI abdomen pending  Seen by GI   Recommended AFP, CEA, CA 19-9, SCARLETT, ASMA, AMA, GGT, IgM and possible colonoscopy/EGD during hospitalization  Continue to monitor    Elevated troponin- (present on admission)  Assessment & Plan  No chest pain.   Unlikely cardiac related, patient is a chronic elevated troponin, levels at baseline.  Continue to monitor      Hyponatremia- (present on admission)  Assessment & Plan  Mild, likely due to dehydration  Continue IV fluids  Continue to monitor    Macrocytic anemia- (present on admission)  Assessment & Plan  Mild, no signs of active bleeding.  Continue to monitor.    Hypothyroidism in adult- (present on admission)  Assessment & Plan  -Continue home Synthroid at 225 mcg daily  -Most recent TSH was approximately 2 months ago was normal at 2.19    Hypertension- (present on admission)  Assessment & Plan  -Continue home amlodipine and metoprolol  -Start as needed clonidine, enalapril and labetalol  -Adjust as needed    Left hip pain- (present on admission)  Assessment & Plan  -I think this is unlikely to be metastatic disease considering it has  a burning sensation down the anterior aspect of her left leg however if it persists or worsens or causes her difficulty ambulating could do additional imaging but no additional work-up at this time      Uncontrolled type 1 diabetes mellitus (HCC)- (present on admission)  Assessment & Plan  A1c 8.5  Continue basal Lantus and SSI           VTE prophylaxis: enoxaparin ppx    I have performed a physical exam and reviewed and updated ROS and Plan today (9/23/2021). In review of yesterday's note (9/22/2021), there are no changes except as documented above.

## 2021-09-23 NOTE — ASSESSMENT & PLAN NOTE
-Continue home Synthroid at 225 mcg daily  -Most recent TSH was approximately 2 months ago was normal at 2.19

## 2021-09-23 NOTE — HOSPITAL COURSE
This is a 66-year-old female with a past medical history of hypertension, type 1 diabetes mellitus, surgical hypothyroidism admitted 9/22/2021 for worsening jaundice and lower abdominal pain.  She reports nausea, diarrhea, and mild intermittent cramping, generalized abdominal pain has been waxing and waning for approximately 3-4 weeks.  She saw her primary care provider who referred her to Dr. Zavala from gastroenterology whom she has seen in the past.  She had initial lab work as well as a CT abdomen which showed colitis and has appointment of follow-up Dr. Zavala in 11/2021. Colonoscopy in recent years showed ulceration of colon. She followed up with her primary care provider on 9/22/2021 who noticed patient's jaundice worsening and was referred to the emergency department for further evaluation.  Lab work is significant for , , alk phos of 8/25/1941, total bilirubin 7.7.  US-RUQ 9/22/2021 showed likely hepatic steatosis and cholelithiasis but no evidence of acute cholecystitis. CT abdomen pelvis showed wall thickening throughout the entire colon anomaly in the cecum, descending, and sigmoid colon-similar to patient's prior CT scan.  MRI abdomen and MRCP did not reveal any evidence of mass, obstruction, biliary duct abnormality.  Doppler revealed arterial blood flow normal throughout the liver.  LFTs, total bilirubin remain elevated, hepatitis panel negative, GGT elevated consistent with liver etiology.  Ammonia levels uptrending without neurological symptoms.  Hepatitis E antibodies, EBV titers, CMV antibodies, HSV antibodies, varicella-zoster antibodies, ceruloplasmin, SCARLETT still in process and pending results.  Dr. Zavala from GI has been consulted/following and recommended [facility] transfer [facility]. Patient is agreeable.

## 2021-09-23 NOTE — ASSESSMENT & PLAN NOTE
No chest pain.   Unlikely cardiac related, patient is a chronic elevated troponin, levels at baseline.  Continue to monitor

## 2021-09-23 NOTE — ED PROVIDER NOTES
"ED Provider Note    CHIEF COMPLAINT  Chief Complaint   Patient presents with   • Jaundice     Reports \"my enzymes were high and I looked yellow so my GP sent me here. I don't even drink. I mean occasionally, but...\".    • Abdominal Pain     Reports low abd pain \"just irritating\". Reports as well nausea and diarrhea. Denies vomiting or bloody stools.        HPI    Primary care provider: Jarrell Yates M.D.  Means of arrival: POV  History obtained from: Patient  History limited by: Nothing    Anu Manuel is a 66 y.o. female who presents with worsening jaundice, this has been getting worse over the last several months, she is being worked up by Dr. Zavala from gastroenterology and her PCP.  She actually saw her PCP earlier today, was having some labs drawn but her PCP noted that the patient has worsening jaundice in clinic so told her to come into the ER for further work-up.  The patient does have some intermittent mild crampy generalized abdominal pain.  No vomiting today.  She gets intermittent chest pain none currently.  Denies fevers or chills but she does feel slightly fatigued.  No alleviating measures noted.  No aggravating factors noted the symptoms of been just constant and worsening particularly her skin and eyes getting more yellow.  She occasionally drinks but not heavily, occasionally takes 1 Tylenol per day but has never overdosed.  Not suicidal.  She does have some increased stress at home because she recently lost her .  She has never had liver disease before this recent episode.  She has been a type I diabetic for many years,    REVIEW OF SYSTEMS  Constitutional: Negative for fever or chills.  Positive for fatigue.  HENT: Negative for rhinorrhea or sore throat.  Respiratory: Negative for cough or shortness of breath.    Cardiovascular: Positive for intermittent chest pain no palpitations or syncope today.  Gastrointestinal: Negative for jolynn positive for intermittent abdominal " "pain.  Genitourinary: Negative for dysuria or flank pain.   Musculoskeletal: Negative for back pain or joint pain.   Skin: Negative for itching or rash.   Neurological: Negative for sensory or motor changes.   Psychiatric/Behavioral: Positive for sad mood due to the recent loss of , negative for thoughts of self-harm.  See HPI for further details. All other systems are negative.     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    PAST FAMILY HISTORY  Family History   Problem Relation Age of Onset   • Hypertension Mother    • Cancer Father        SOCIAL HISTORY  Social History     Tobacco Use   • Smoking status: Current Every Day Smoker     Packs/day: 1.00     Years: 30.00     Pack years: 30.00     Types: Cigarettes   • Smokeless tobacco: Never Used   • Tobacco comment: 1 ppd    Vaping Use   • Vaping Use: Never used   Substance and Sexual Activity   • Alcohol use: Yes     Comment:  \"maybe once a month I'll have some wine\".    • Drug use: No   • Sexual activity: Not on file       SURGICAL HISTORY   has a past surgical history that includes hysterectomy, vaginal (2006); thyroid lobectomy (1973); lumpectomy (1976, 2005); cervical disk and fusion anterior (03/12/08); tonsillectomy (1963); cervical fusion posterior (1/16/2009); hardware removal neuro (1/16/2009); neck exploration (1/16/2009); abdominal hysterectomy total; lumpectomy; lumbar laminectomy " "diskectomy (Right, 5/10/2016); shoulder decompression arthroscopic (6/17/08); clavicle distal excision (6/17/08); shoulder arthroscopy w/ rotator cuff repair (10/9/08); njx aa&/strd tfrml epi lumbar/sacral 1 level (Right, 8/31/2016); spinal cord stimulator (N/A, 10/26/2018); thoracic laminectomy (N/A, 10/26/2018); appendectomy (2004); implant neurostim/ (N/A, 12/16/2019); irrigation & debridement neuro (1/19/2020); and cath placement (1/25/2020).    CURRENT MEDICATIONS  Home Medications     Reviewed by Latia Arceo R.N. (Registered Nurse) on 09/22/21 at 1825  Med List Status: Not Addressed   Medication Last Dose Status   ACETAMINOPHEN EXTRA STRENGTH 500 MG Tab  Active   amLODIPine (NORVASC) 10 MG Tab  Active   aspirin 81 MG EC tablet  Active   atorvastatin (LIPITOR) 40 MG Tab  Active   Cholecalciferol (VITAMIN D3 PO)  Active   Continuous Blood Gluc Sensor (PhilrealestatesSTYLE PARISA 14 DAY SENSOR) Misc  Active   insulin aspart (NOVOLOG) 100 UNIT/ML Solution  Active   lidocaine (LIDODERM) 5 % Patch  Active   metoprolol (TOPROL-XL) 200 MG XL tablet  Active   SYNTHROID 200 MCG Tab  Active   SYNTHROID 25 MCG Tab  Active   traZODone (DESYREL) 100 MG Tab  Active                ALLERGIES  Allergies   Allergen Reactions   • Ativan Unspecified      Extreme Restlessness with whole body  VGW=[id number]   • Tape      Blisters, paper tape is ok       PHYSICAL EXAM  VITAL SIGNS: /88   Pulse 100   Temp 37.1 °C (98.7 °F) (Temporal)   Resp 19   Ht 1.676 m (5' 6\")   Wt 59.1 kg (130 lb 4.7 oz)   LMP 04/29/2005 (LMP Unknown)   SpO2 99%   BMI 21.03 kg/m²    Pulse ox interpretation: On room air, I interpret this pulse ox as normal.  Constitutional: Lying on the stretcher in mild distress.  HEENT: Normocephalic, atraumatic. Posterior pharynx clear, mucous membranes dry.  Eyes:  EOMI. PERRLA 3-2, icteric sclerae.  Neck: Supple, nontender.  Chest/Pulmonary: Slightly diminished to ausculation bilaterally, no wheezes or " rhonchi.  Cardiovascular: Tachycardic rate, regular rhythm, no obvious murmur.   Abdomen: Soft, nontender; no rebound, guarding, or masses.  Back: No CVA or midline tenderness.   Musculoskeletal: No deformity or edema.  Neuro: Alert, clear speech, no focal weakness or asymmetry, no asterixis.  Psych: Normal mood and affect.  Skin: No rashes, warm and dry.      DIAGNOSTIC STUDIES / PROCEDURES    LABS & EKG  Results for orders placed or performed during the hospital encounter of 09/22/21   CBC WITH DIFFERENTIAL   Result Value Ref Range    WBC 9.9 4.8 - 10.8 K/uL    RBC 3.59 (L) 4.20 - 5.40 M/uL    Hemoglobin 11.3 (L) 12.0 - 16.0 g/dL    Hematocrit 35.6 (L) 37.0 - 47.0 %    MCV 99.2 (H) 81.4 - 97.8 fL    MCH 31.5 27.0 - 33.0 pg    MCHC 31.7 (L) 33.6 - 35.0 g/dL    RDW 51.9 (H) 35.9 - 50.0 fL    Platelet Count 307 164 - 446 K/uL    MPV 11.0 9.0 - 12.9 fL    Neutrophils-Polys 73.50 (H) 44.00 - 72.00 %    Lymphocytes 11.30 (L) 22.00 - 41.00 %    Monocytes 12.10 0.00 - 13.40 %    Eosinophils 1.60 0.00 - 6.90 %    Basophils 0.90 0.00 - 1.80 %    Immature Granulocytes 0.60 0.00 - 0.90 %    Nucleated RBC 0.00 /100 WBC    Neutrophils (Absolute) 7.29 (H) 2.00 - 7.15 K/uL    Lymphs (Absolute) 1.12 1.00 - 4.80 K/uL    Monos (Absolute) 1.20 (H) 0.00 - 0.85 K/uL    Eos (Absolute) 0.16 0.00 - 0.51 K/uL    Baso (Absolute) 0.09 0.00 - 0.12 K/uL    Immature Granulocytes (abs) 0.06 0.00 - 0.11 K/uL    NRBC (Absolute) 0.00 K/uL   COMP METABOLIC PANEL   Result Value Ref Range    Sodium 131 (L) 135 - 145 mmol/L    Potassium 3.8 3.6 - 5.5 mmol/L    Chloride 96 96 - 112 mmol/L    Co2 23 20 - 33 mmol/L    Anion Gap 12.0 7.0 - 16.0    Glucose 259 (H) 65 - 99 mg/dL    Bun 19 8 - 22 mg/dL    Creatinine 0.94 0.50 - 1.40 mg/dL    Calcium 8.6 8.4 - 10.2 mg/dL    AST(SGOT) 326 (H) 12 - 45 U/L    ALT(SGPT) 280 (H) 2 - 50 U/L    Alkaline Phosphatase 2541 (H) 30 - 99 U/L    Total Bilirubin 7.7 (H) 0.1 - 1.5 mg/dL    Albumin 3.4 3.2 - 4.9 g/dL    Total  Protein 7.0 6.0 - 8.2 g/dL    Globulin 3.6 (H) 1.9 - 3.5 g/dL    A-G Ratio 0.9 g/dL   LIPASE   Result Value Ref Range    Lipase 20 7 - 58 U/L   URINALYSIS    Specimen: Urine   Result Value Ref Range    Color Elida     Character Hazy (A)     Specific Gravity >=1.030 <1.035    Ph 5.5 5.0 - 8.0    Glucose 500 (A) Negative mg/dL    Ketones Trace (A) Negative mg/dL    Protein 100 (A) Negative mg/dL    Bilirubin Large (A) Negative    Nitrite Negative Negative    Leukocyte Esterase Negative Negative    Occult Blood Trace (A) Negative    Micro Urine Req Microscopic    MAGNESIUM   Result Value Ref Range    Magnesium 1.8 1.5 - 2.5 mg/dL   PROTHROMBIN TIME   Result Value Ref Range    PT 12.5 12.0 - 14.6 sec    INR 1.01 0.87 - 1.13   APTT   Result Value Ref Range    APTT 27.3 24.7 - 36.0 sec   TROPONIN   Result Value Ref Range    Troponin T 34 (H) 6 - 19 ng/L   CRP Quantitative (Non-Cardiac)   Result Value Ref Range    Stat C-Reactive Protein 2.85 (H) 0.00 - 0.75 mg/dL   Procalcitonin   Result Value Ref Range    Procalcitonin 0.63 (H) <0.25 ng/mL   VENOUS BLOOD GAS   Result Value Ref Range    Venous Bg Ph 7.39 7.31 - 7.45    Venous Bg Pco2 38.2 (L) 41.0 - 51.0 mmHg    Venous Bg Po2 23.1 (L) 25.0 - 40.0 mmHg    Venous Bg O2 Saturation 40.7 %    Venous Bg Hco3 23 (L) 24 - 28 mmol/L    Venous Bg Base Excess -2 mmol/L    Body Temp see below Centigrade   LACTIC ACID   Result Value Ref Range    Lactic Acid 1.7 0.5 - 2.0 mmol/L   ACETAMINOPHEN   Result Value Ref Range    Acetaminophen -Tylenol <5.0 (L) 10.0 - 30.0 ug/mL   URINE MICROSCOPIC (W/UA)   Result Value Ref Range    WBC 2-5 /hpf    RBC 2-5 (A) /hpf    Bacteria Few (A) None /hpf    Epithelial Cells Few Few /hpf    Mucous Threads Few /hpf    Ca Oxalate Crystal Few /hpf    Hyaline Cast 0-2 /lpf   ESTIMATED GFR   Result Value Ref Range    GFR If African American >60 >60 mL/min/1.73 m 2    GFR If Non African American 60 >60 mL/min/1.73 m 2   AMMONIA   Result Value Ref Range     Ammonia 26 11 - 45 umol/L   EKG   Result Value Ref Range    Report       [facility]    Test Date:  2021  Pt Name:    KALPANA BUTTS               Department: [facility]  MRN:        [id number]                      [facility]  Gender:     Female                       Technician: OLAYINKA  :        [id number]                   Requested By:RODO NAVARRO  Order #:    [id number]                    Reading MD: Rodo Navarro MD    Measurements  Intervals                                Axis  Rate:       96                           P:          54  MT:         153                          QRS:        45  QRSD:       81                           T:          5  QT:         356  QTc:        450    Interpretive Statements  Sinus rhythm  Artifact in lead(s) I,III,aVL,V1,V2,V3,V4,V5,V6  Compared to ECG 2021 15:08:23  No significant changes  No STEMI  Electronically Signed On 2021 23:28:14 PDT by Rodo Navarro MD         RADIOLOGY  CT-ABDOMEN-PELVIS WITH   Final Result         1. Wall thickening throughout the entire colon, most in the cecum, descending and sigmoid colon, similar to prior      DX-CHEST-PORTABLE (1 VIEW)   Final Result         1. No acute cardiopulmonary abnormalities are identified.      US-RUQ   Final Result      1. Mildly echogenic liver, most commonly hepatic steatosis.   2. Cholelithiasis. No sonographic evidence of acute cholecystitis.         MR-ABDOMEN-WITH & W/O    (Results Pending)       COURSE & MEDICAL DECISION MAKING    This is a 66 y.o. female who presents with worsening jaundice.    Differential Diagnosis includes but is not limited to:  Liver disease, obstruction, malignancy, medication reaction, sepsis    ED Course:  66-year-old female coming in with fairly painless jaundice.  No fevers.  Plan ultrasound and labs.    Thankfully kidney function is stable but unfortunately her LFTs are acutely worsening in the last 8 days her bilirubin  has almost quadrupled.  Try consulting with her gastroenterologist his on-call colleague Dr. Vanegas thought if the patient was stable she may be discharged for urgent outpatient follow-up.  Unfortunately at that time I did not see how elevated her alkaline phosphatase was.  Nothing acute on ultrasound today.  CT scan obtained no obvious large malignancy, since I do not have a cause for the patient's acutely worsening LFTs I think she needs further work-up and treatment in the hospital.  Discussed the case with hospitalist Dr. Singletary, and he will kindly admit the patient for further work-up and treatment.  Patient hemodynamically stable for admission in guarded condition.    Medications   amLODIPine (NORVASC) tablet 5 mg (has no administration in time range)   aspirin EC (ECOTRIN) tablet 81 mg (has no administration in time range)   lidocaine (LIDODERM) 5 % 1 Patch (has no administration in time range)   metoprolol (TOPROL-XL) TB24 200 mg (has no administration in time range)   levothyroxine (SYNTHROID) tablet 200 mcg (has no administration in time range)   levothyroxine (SYNTHROID) tablet 25 mcg (has no administration in time range)   traZODone (DESYREL) tablet 100 mg (has no administration in time range)   senna-docusate (PERICOLACE or SENOKOT S) 8.6-50 MG per tablet 2 Tablet (has no administration in time range)     And   polyethylene glycol/lytes (MIRALAX) PACKET 1 Packet (has no administration in time range)     And   magnesium hydroxide (MILK OF MAGNESIA) suspension 30 mL (has no administration in time range)     And   bisacodyl (DULCOLAX) suppository 10 mg (has no administration in time range)   NS infusion (has no administration in time range)   enoxaparin (LOVENOX) inj 40 mg (has no administration in time range)   cloNIDine (CATAPRES) tablet 0.1 mg (has no administration in time range)   enalaprilat (Vasotec) injection 1.25 mg 1 mL (has no administration in time range)   labetalol (NORMODYNE/TRANDATE)  injection 10 mg (has no administration in time range)   ondansetron (ZOFRAN) syringe/vial injection 4 mg (has no administration in time range)   ondansetron (ZOFRAN ODT) dispertab 4 mg (has no administration in time range)   promethazine (PHENERGAN) tablet 12.5-25 mg (has no administration in time range)   promethazine (PHENERGAN) suppository 12.5-25 mg (has no administration in time range)   prochlorperazine (COMPAZINE) injection 5-10 mg (has no administration in time range)   insulin regular (HumuLIN R,NovoLIN R) injection (has no administration in time range)     And   glucose 4 g chewable tablet 16 g (has no administration in time range)     And   dextrose 50% (D50W) injection 50 mL (has no administration in time range)   ALPRAZolam (XANAX) tablet 0.5 mg (has no administration in time range)   lactated ringers infusion (BOLUS) (0 mL Intravenous Stopped 9/23/21 0139)   iohexol (OMNIPAQUE) 350 mg/mL (100 mL Intravenous Given 9/23/21 0054)       FINAL IMPRESSION  1. Jaundice    2. Type 1 diabetes mellitus with hyperglycemia (HCC)    3. Abnormal LFTs    4. Hyponatremia    5. History of coronary artery disease    6. Anxiety    7. Dehydration        -ADMIT-      Pertinent Labs & Imaging studies reviewed and verified by myself, as well as nursing notes and the patient's past medical, family, and social histories (See chart for details).    Portions of this record were made with voice recognition software.  Despite my review, spelling/grammar/context errors may still remain.  Interpretation of this chart should be taken in this context.    Electronically signed by Rodo Maxwell M.D. on 9/23/2021 at 1:50 AM.

## 2021-09-23 NOTE — ASSESSMENT & PLAN NOTE
-I think this is unlikely to be metastatic disease considering it has a burning sensation down the anterior aspect of her left leg however if it persists or worsens or causes her difficulty ambulating could do additional imaging but no additional work-up at this time

## 2021-09-24 ENCOUNTER — APPOINTMENT (OUTPATIENT)
Dept: RADIOLOGY | Facility: MEDICAL CENTER | Age: 66
DRG: 445 | End: 2021-09-24
Attending: INTERNAL MEDICINE
Payer: MEDICARE

## 2021-09-24 LAB
ALBUMIN SERPL BCP-MCNC: 3.1 G/DL (ref 3.2–4.9)
ALBUMIN/GLOB SERPL: 0.8 G/DL
ALP SERPL-CCNC: 2594 U/L (ref 30–99)
ALT SERPL-CCNC: 233 U/L (ref 2–50)
AMMONIA PLAS-SCNC: 48 UMOL/L (ref 11–45)
ANION GAP SERPL CALC-SCNC: 12 MMOL/L (ref 7–16)
AST SERPL-CCNC: 257 U/L (ref 12–45)
BASOPHILS # BLD AUTO: 1.2 % (ref 0–1.8)
BASOPHILS # BLD: 0.12 K/UL (ref 0–0.12)
BILIRUB SERPL-MCNC: 8.4 MG/DL (ref 0.1–1.5)
BUN SERPL-MCNC: 20 MG/DL (ref 8–22)
CALCIUM SERPL-MCNC: 8.5 MG/DL (ref 8.4–10.2)
CHLORIDE SERPL-SCNC: 98 MMOL/L (ref 96–112)
CO2 SERPL-SCNC: 22 MMOL/L (ref 20–33)
CREAT SERPL-MCNC: 0.66 MG/DL (ref 0.5–1.4)
EOSINOPHIL # BLD AUTO: 0.29 K/UL (ref 0–0.51)
EOSINOPHIL NFR BLD: 3 % (ref 0–6.9)
ERYTHROCYTE [DISTWIDTH] IN BLOOD BY AUTOMATED COUNT: 48.5 FL (ref 35.9–50)
GGT SERPL-CCNC: 1191 U/L (ref 7–34)
GLOBULIN SER CALC-MCNC: 3.7 G/DL (ref 1.9–3.5)
GLUCOSE BLD-MCNC: 135 MG/DL (ref 65–99)
GLUCOSE BLD-MCNC: 207 MG/DL (ref 65–99)
GLUCOSE BLD-MCNC: 276 MG/DL (ref 65–99)
GLUCOSE SERPL-MCNC: 182 MG/DL (ref 65–99)
HBV CORE AB SERPL QL IA: NONREACTIVE
HBV SURFACE AB SERPL IA-ACNC: <3.5 MIU/ML (ref 0–10)
HCT VFR BLD AUTO: 34.6 % (ref 37–47)
HGB BLD-MCNC: 11 G/DL (ref 12–16)
IMM GRANULOCYTES # BLD AUTO: 0.03 K/UL (ref 0–0.11)
IMM GRANULOCYTES NFR BLD AUTO: 0.3 % (ref 0–0.9)
LIPASE SERPL-CCNC: 19 U/L (ref 7–58)
LYMPHOCYTES # BLD AUTO: 1.23 K/UL (ref 1–4.8)
LYMPHOCYTES NFR BLD: 12.7 % (ref 22–41)
MAGNESIUM SERPL-MCNC: 1.7 MG/DL (ref 1.5–2.5)
MCH RBC QN AUTO: 31.7 PG (ref 27–33)
MCHC RBC AUTO-ENTMCNC: 31.8 G/DL (ref 33.6–35)
MCV RBC AUTO: 99.7 FL (ref 81.4–97.8)
MONOCYTES # BLD AUTO: 1.02 K/UL (ref 0–0.85)
MONOCYTES NFR BLD AUTO: 10.6 % (ref 0–13.4)
NEUTROPHILS # BLD AUTO: 6.96 K/UL (ref 2–7.15)
NEUTROPHILS NFR BLD: 72.2 % (ref 44–72)
NRBC # BLD AUTO: 0 K/UL
NRBC BLD-RTO: 0 /100 WBC
PHOSPHATE SERPL-MCNC: 2.9 MG/DL (ref 2.5–4.5)
PLATELET # BLD AUTO: 317 K/UL (ref 164–446)
PMV BLD AUTO: 11.5 FL (ref 9–12.9)
POTASSIUM SERPL-SCNC: 4.1 MMOL/L (ref 3.6–5.5)
PROT SERPL-MCNC: 6.8 G/DL (ref 6–8.2)
RBC # BLD AUTO: 3.47 M/UL (ref 4.2–5.4)
SODIUM SERPL-SCNC: 132 MMOL/L (ref 135–145)
WBC # BLD AUTO: 9.7 K/UL (ref 4.8–10.8)

## 2021-09-24 PROCEDURE — 76700 US EXAM ABDOM COMPLETE: CPT

## 2021-09-24 PROCEDURE — 82962 GLUCOSE BLOOD TEST: CPT

## 2021-09-24 PROCEDURE — 86787 VARICELLA-ZOSTER ANTIBODY: CPT | Mod: 91

## 2021-09-24 PROCEDURE — 74183 MRI ABD W/O CNTR FLWD CNTR: CPT | Mod: ME

## 2021-09-24 PROCEDURE — 700102 HCHG RX REV CODE 250 W/ 637 OVERRIDE(OP): Performed by: INTERNAL MEDICINE

## 2021-09-24 PROCEDURE — 86790 VIRUS ANTIBODY NOS: CPT

## 2021-09-24 PROCEDURE — 82390 ASSAY OF CERULOPLASMIN: CPT

## 2021-09-24 PROCEDURE — 82140 ASSAY OF AMMONIA: CPT

## 2021-09-24 PROCEDURE — 86225 DNA ANTIBODY NATIVE: CPT

## 2021-09-24 PROCEDURE — 86664 EPSTEIN-BARR NUCLEAR ANTIGEN: CPT

## 2021-09-24 PROCEDURE — 86704 HEP B CORE ANTIBODY TOTAL: CPT

## 2021-09-24 PROCEDURE — 86665 EPSTEIN-BARR CAPSID VCA: CPT

## 2021-09-24 PROCEDURE — 82105 ALPHA-FETOPROTEIN SERUM: CPT

## 2021-09-24 PROCEDURE — A9576 INJ PROHANCE MULTIPACK: HCPCS | Performed by: INTERNAL MEDICINE

## 2021-09-24 PROCEDURE — 83516 IMMUNOASSAY NONANTIBODY: CPT | Mod: 91

## 2021-09-24 PROCEDURE — 770006 HCHG ROOM/CARE - MED/SURG/GYN SEMI*

## 2021-09-24 PROCEDURE — 86039 ANTINUCLEAR ANTIBODIES (ANA): CPT

## 2021-09-24 PROCEDURE — 86644 CMV ANTIBODY: CPT

## 2021-09-24 PROCEDURE — 83690 ASSAY OF LIPASE: CPT

## 2021-09-24 PROCEDURE — A9270 NON-COVERED ITEM OR SERVICE: HCPCS | Performed by: INTERNAL MEDICINE

## 2021-09-24 PROCEDURE — 82784 ASSAY IGA/IGD/IGG/IGM EACH: CPT

## 2021-09-24 PROCEDURE — 84100 ASSAY OF PHOSPHORUS: CPT

## 2021-09-24 PROCEDURE — 700105 HCHG RX REV CODE 258: Performed by: INTERNAL MEDICINE

## 2021-09-24 PROCEDURE — 87529 HSV DNA AMP PROBE: CPT

## 2021-09-24 PROCEDURE — 83735 ASSAY OF MAGNESIUM: CPT

## 2021-09-24 PROCEDURE — 82977 ASSAY OF GGT: CPT

## 2021-09-24 PROCEDURE — 700117 HCHG RX CONTRAST REV CODE 255: Performed by: INTERNAL MEDICINE

## 2021-09-24 PROCEDURE — 36415 COLL VENOUS BLD VENIPUNCTURE: CPT

## 2021-09-24 PROCEDURE — 99233 SBSQ HOSP IP/OBS HIGH 50: CPT | Performed by: INTERNAL MEDICINE

## 2021-09-24 PROCEDURE — 700111 HCHG RX REV CODE 636 W/ 250 OVERRIDE (IP): Performed by: INTERNAL MEDICINE

## 2021-09-24 PROCEDURE — 80053 COMPREHEN METABOLIC PANEL: CPT

## 2021-09-24 PROCEDURE — 86663 EPSTEIN-BARR ANTIBODY: CPT

## 2021-09-24 PROCEDURE — 86235 NUCLEAR ANTIGEN ANTIBODY: CPT | Mod: 91

## 2021-09-24 PROCEDURE — 86706 HEP B SURFACE ANTIBODY: CPT

## 2021-09-24 PROCEDURE — 86038 ANTINUCLEAR ANTIBODIES: CPT

## 2021-09-24 PROCEDURE — 86645 CMV ANTIBODY IGM: CPT

## 2021-09-24 PROCEDURE — 85025 COMPLETE CBC W/AUTO DIFF WBC: CPT

## 2021-09-24 RX ORDER — CALCIUM CARBONATE 500 MG/1
500 TABLET, CHEWABLE ORAL 4 TIMES DAILY PRN
Status: DISCONTINUED | OUTPATIENT
Start: 2021-09-24 | End: 2021-09-26 | Stop reason: HOSPADM

## 2021-09-24 RX ORDER — LORAZEPAM 0.5 MG/1
.5-1 TABLET ORAL 3 TIMES DAILY PRN
Status: DISCONTINUED | OUTPATIENT
Start: 2021-09-24 | End: 2021-09-26 | Stop reason: HOSPADM

## 2021-09-24 RX ADMIN — LEVOTHYROXINE SODIUM 200 MCG: 0.05 TABLET ORAL at 06:09

## 2021-09-24 RX ADMIN — SODIUM CHLORIDE: 9 INJECTION, SOLUTION INTRAVENOUS at 21:52

## 2021-09-24 RX ADMIN — NICOTINE POLACRILEX 2 MG: 2 GUM, CHEWING BUCCAL at 21:43

## 2021-09-24 RX ADMIN — ENOXAPARIN SODIUM 40 MG: 40 INJECTION SUBCUTANEOUS at 06:12

## 2021-09-24 RX ADMIN — SERTRALINE 100 MG: 50 TABLET, FILM COATED ORAL at 00:09

## 2021-09-24 RX ADMIN — NICOTINE TRANSDERMAL SYSTEM 21 MG: 21 PATCH, EXTENDED RELEASE TRANSDERMAL at 06:11

## 2021-09-24 RX ADMIN — GADOTERIDOL 10 ML: 279.3 INJECTION, SOLUTION INTRAVENOUS at 10:47

## 2021-09-24 RX ADMIN — METOPROLOL SUCCINATE 200 MG: 25 TABLET, EXTENDED RELEASE ORAL at 06:09

## 2021-09-24 RX ADMIN — SERTRALINE 100 MG: 50 TABLET, FILM COATED ORAL at 21:43

## 2021-09-24 RX ADMIN — INSULIN HUMAN 5 UNITS: 100 INJECTION, SOLUTION PARENTERAL at 16:32

## 2021-09-24 RX ADMIN — LORAZEPAM 1 MG: 0.5 TABLET ORAL at 21:43

## 2021-09-24 RX ADMIN — LORAZEPAM 1 MG: 0.5 TABLET ORAL at 11:14

## 2021-09-24 RX ADMIN — ASPIRIN 81 MG: 81 TABLET, COATED ORAL at 06:11

## 2021-09-24 RX ADMIN — INSULIN HUMAN 3 UNITS: 100 INJECTION, SOLUTION PARENTERAL at 21:49

## 2021-09-24 RX ADMIN — LEVOTHYROXINE SODIUM 25 MCG: 0.05 TABLET ORAL at 06:10

## 2021-09-24 RX ADMIN — NICOTINE POLACRILEX 2 MG: 2 GUM, CHEWING BUCCAL at 02:20

## 2021-09-24 RX ADMIN — SODIUM CHLORIDE: 9 INJECTION, SOLUTION INTRAVENOUS at 06:09

## 2021-09-24 RX ADMIN — INSULIN GLARGINE 15 UNITS: 100 INJECTION, SOLUTION SUBCUTANEOUS at 16:31

## 2021-09-24 RX ADMIN — AMLODIPINE BESYLATE 5 MG: 5 TABLET ORAL at 06:10

## 2021-09-24 ASSESSMENT — ENCOUNTER SYMPTOMS
CONSTIPATION: 0
HEARTBURN: 1
COUGH: 0
PALPITATIONS: 0
DEPRESSION: 1
WEIGHT LOSS: 1
INSOMNIA: 1
FEVER: 0
DIARRHEA: 1
FLANK PAIN: 0
ABDOMINAL PAIN: 0
SHORTNESS OF BREATH: 0
HEMOPTYSIS: 0
SPUTUM PRODUCTION: 0
SORE THROAT: 0
VOMITING: 0
NERVOUS/ANXIOUS: 0
SENSORY CHANGE: 0
MYALGIAS: 0
HEADACHES: 0
ORTHOPNEA: 0
WEAKNESS: 0
CHILLS: 0
FALLS: 0
NAUSEA: 1
BLOOD IN STOOL: 0
DIZZINESS: 0

## 2021-09-24 ASSESSMENT — PAIN DESCRIPTION - PAIN TYPE: TYPE: ACUTE PAIN

## 2021-09-24 NOTE — PROGRESS NOTES
Gastroenterology Progress Note     Author: Hayden Zavala M.D.     Date & Time Created: 9/24/2021 3:23 PM    Patient ID:  Name:             Anu Manuel   YOB: 1955  Age:                 66 y.o.  female   MRN:               [id number]    Chief Complaint:     Jaundice      Current Illness:  Anu Manuel was admitted through the ER to evaluate jaundice for about 3 days.  She noticed dark urine during the last 2-3-3 weeks.  She also has early satiety and recent nausea.  She lost 23 pounds since 3/2021.  She denied supplements and mushroom ingestion.  .  Alcohol: 1 glass of wine per month.  No prior hepatitis.  No family history of liver disease.     Selected Labs  8/2020: LT: 117-28-39-0.3  9/2020: LT: 528--0.4, lipase 7  2/2021: LT: 141-37-45-0.4,  , GFR 44  3/2021: -27-37-0.3,   with high liver fraction 104 (0-94).  5/27/2021: LT: 90-30-33-0.3  9/14/2021: LT: 5865-713-330-2.3-1.6  9/22/2021: LT: 6199-559-834-7.7, albumin 3.4, INR 1.0, PTT 27     Liver evaluation in 9/2021  Negative tests:  .  Lipase  .  Acute hepatitis panel  .  Iron saturation, ferritin  Positive tests:  .  Ammonia 26 on 9/22, elevated at 48 on 9/24.    Pending tests:  MRCP  Total HBcAb  HBsAb  Hepatitis E antibodies  EBV titers  CMV antibodies  Herpes simplex antibodies  Varicella-zoster antibodies  Ceruloplasmin  SCARLETT  F-actin antibody  IgG level  AMA     Imaging  APCT 9/17/2021: Mildly thick wall of cecum, descending and sigmoid colon.  Possible Crohn's disease.     APCT 9/22/2021:  Contracted gallbladder.  Normal liver.  Diffusely thick colon wall, mostly in the cecum, descending and sigmoid colon, similar to the prior CT.     US abdomen 9/22/2021: Gallstones.  Contracted gallbladder.  CBD diameter 4 mm.  Echogenic liver.    US Doppler liver vessels 9/24/2021: Patent hepatic vein, portal vein and splenic vein.  Gallstones.    MRI pancreas: No liver or pancreatic mass.  No biliary  dilation.  Normal patent hepatic veins.      Interval History:  9/24/2021  .  Her symptoms are the same.  No new symptoms.  .  Discussed the MRI pancreas with Dr. Jean.  The MRI was ordered as MRI of the pancreas, and not an MRCP.    Assessment:  Transfer to [facility] hepatology  .  Discussed her condition with [facility] hepatologist Dr. Saleem Herrera, who think she should be transferred to [facility] for additional evaluation and treatment.  I agree.  I discussed this option with her, and with her daughter, who listened in by phone, and they both agree.    Elevated liver tests  .  Mixed pattern, both cholestasis and hepatocellular.  The cause is unclear.  ---Await the pending lab tests.  ---MRCP.    Elevated ammonia level  .  Ammonia 26 on 9/22, elevated at 48 on 9/24.  ---Follow the ammonia level.     Mildly thick colon wall on CT  ---Evaluate with colonoscopy at the optimal timing.     Lower abdominal pain  . In 6/2021 she developed almost constant mild lower abdominal pain.  . Rule out an organic cause.     Colon cancer screening  . COL 5/2018:  Poor preparation.  Segmental sigmoid colitis with benign ulceration.  .  There is a history of colon polyps, but the validity is unclear.  ---Because of the poor preparation, consider a colonoscopy at the optimal timing. Discuss this issue with her later.    Recommendations:  MRCP  Transfer to [facility] hepatology when it can be arranged.  ---Send a DVD (containing the MRI and MRCP images) to [facility] with her.      Labs:  Recent Labs     09/22/21 2013 09/23/21  0500 09/24/21  1031   WBC 9.9 9.3 9.7   RBC 3.59* 3.39* 3.47*   HEMOGLOBIN 11.3* 10.6* 11.0*   HEMATOCRIT 35.6* 33.0* 34.6*   MCV 99.2* 97.3 99.7*   MCH 31.5 31.3 31.7   MCHC 31.7* 32.1* 31.8*   RDW 51.9* 50.4* 48.5   PLATELETCT 307 286 317   MPV 11.0 11.4 11.5      Recent Labs     09/22/21 2013   APTT 27.3   INR 1.01   review  Recent Labs     09/23/21  0500 09/23/21  1403 09/24/21  1031   SODIUM 130* 131* 132*   POTASSIUM 4.3 4.6 4.1    CHLORIDE 96 99 98   CO2 20 21 22   GLUCOSE 422* 295* 182*   BUN 16 16 20   CREATININE 0.74 0.67 0.66   CALCIUM 8.2* 8.0* 8.5     Recent Labs     09/22/21 2013 09/22/21 2013 09/23/21  0500 09/23/21  1403 09/24/21  1031   ALTSGPT 280*  --  241*  --  233*   ASTSGOT 326*  --  274*  --  257*   ALKPHOSPHAT 2541*  --  2405*  --  2594*   TBILIRUBIN 7.7*  --  7.5*  --  8.4*   LIPASE 20  --   --   --  19   GLUCOSE 259*   < > 422* 295* 182*    < > = values in this interval not displayed.       No results found for: BLOODCULTU, BLDCULT, BCHOLD     GI/Nutrition:  Orders Placed This Encounter   Procedures   • Diet Order Diet: Consistent CHO (Diabetic)     Standing Status:   Standing     Number of Occurrences:   1     Order Specific Question:   Diet:     Answer:   Consistent CHO (Diabetic) [4]       Hospital Medications:  IV infusions:  None  amLODIPine, 5 mg, Oral, Q DAY  aspirin, 81 mg, Oral, QAM  metoprolol SR, 200 mg, Oral, DAILY  levothyroxine, 200 mcg, Oral, QDAY  levothyroxine, 25 mcg, Oral, AM ES  senna-docusate, 2 Tablet, Oral, BID  enoxaparin, 40 mg, Subcutaneous, DAILY  insulin regular, 2-9 Units, Subcutaneous, 4X/DAY ACHS  insulin glargine, 15 Units, Subcutaneous, Q EVENING  nicotine, 21 mg, Transdermal, Daily-0600  sertraline, 100 mg, Oral, QHS      PRN medications: LORazepam, senna-docusate **AND** polyethylene glycol/lytes **AND** magnesium hydroxide **AND** bisacodyl, cloNIDine, enalaprilat, labetalol, ondansetron, ondansetron, promethazine, promethazine, prochlorperazine, insulin regular **AND** POC blood glucose manual result **AND** NOTIFY MD and PharmD **AND** glucose **AND** dextrose 50%, nicotine **AND** Nicotine Replacement Patient Education Materials **AND** nicotine polacrilex    Fluids:    Intake/Output Summary (Last 24 hours) at 9/24/2021 1523  Last data filed at 9/24/2021 1300  Gross per 24 hour   Intake 980 ml   Output --   Net 980 ml          Past Medical History:   Past Medical History:  "  Diagnosis Date   • Adverse effect of anesthesia     in 2008 \"throat closes up\"\"anxiety\" ?laryngospasm, kept in ICU. Pt states no problems currently 2018.    • Anesthesia     in 2008 \"throat closes up\"\"anxiety\" ?laryngospasm, kept in ICU. Pt states no problems currently 2018.    • Arthritis     right shoulder, hands   • Cigarette smoker quit 2013   • Dental disorder     missing teeth    • depression 8/30/2016    denies depression, states has anxiety and panic attacks   • Diabetes mellitus type 1 (HCC) 1989    insulin   • Encounter for long-term (current) use of insulin (HCC) 9/25/2013   • Heart burn    • High cholesterol    • Hypertension    • Hypothyroidism, postsurgical 1970   • Indigestion    • Infectious disease      had hepatitis C, tested neg.   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.63) 6/10/2015       Past Surgical History:  Past Surgical History:   Procedure Laterality Date   • CATH PLACEMENT  1/25/2020    Procedure: INSERTION, CATHETER PERM;  Surgeon: Rola Mendoza M.D.;  Location: [facility];  Service: General   • IRRIGATION & DEBRIDEMENT NEURO  1/19/2020    Procedure: IRRIGATION AND DEBRIDEMENT, WOUND THORACIC AND LUMBAR;  Surgeon: Ryan Roman M.D.;  Location: [facility];  Service: Neurosurgery   • PB IMPLANT NEUROSTIM/ N/A 12/16/2019    Procedure: EXPLORATION AT THORACIC 8 - 9, REPLACEMENT OF  NEUROSTIMULATOR LEAD;  Surgeon: Ryan Roman M.D.;  Location: [facility];  Service: Neurosurgery   • SPINAL CORD STIMULATOR N/A 10/26/2018    Procedure: SPINAL CORD STIMULATOR;  Surgeon: Ryan Roman M.D.;  Location: [facility];  Service: Neurosurgery   • THORACIC LAMINECTOMY N/A 10/26/2018    Procedure: THORACIC " "LAMINECTOMY - FOR;  Surgeon: Ryan Roman M.D.;  Location: SURGERY [facility];  Service: Neurosurgery   • IN NJX AA&/STRD TFRML EPI LUMBAR/SACRAL 1 LEVEL Right 8/31/2016    Procedure: INJ-FORAMEN EPI LUM/SAC SNGL L4-5;  Surgeon: Sukhi Godfrey M.D.;  Location: SURGERY [facility];  Service: Pain Management   • LUMBAR LAMINECTOMY DISKECTOMY Right 5/10/2016    Procedure: LUMBAR L4-5 HEMILAMINECTOMY DISKECTOMY ;  Surgeon: Arnold Keyes M.D.;  Location: SURGERY [facility];  Service:    • CERVICAL FUSION POSTERIOR  1/16/2009    Performed by TARA CONTRERAS at [facility]   • HARDWARE REMOVAL NEURO  1/16/2009    Performed by TARA CONTRERAS at [facility]   • NECK EXPLORATION  1/16/2009    Performed by TARA CONTRERAS at [facility]   • SHOULDER ARTHROSCOPY W/ ROTATOR CUFF REPAIR  10/9/08    Performed by SHERLY CASTANEDA at [facility]   • SHOULDER DECOMPRESSION ARTHROSCOPIC  6/17/08    Performed by SHERLY CASTANEDA at [facility]   • CLAVICLE DISTAL EXCISION  6/17/08    Performed by SHERLY CASTANEDA at [facility]   • CERVICAL DISK AND FUSION ANTERIOR  03/12/08   • HYSTERECTOMY, VAGINAL  2006   • APPENDECTOMY  2004   • THYROID LOBECTOMY  1973   • TONSILLECTOMY  1963   • ABDOMINAL HYSTERECTOMY TOTAL     • LUMPECTOMY  1976, 2005    Breast    • LUMPECTOMY         Physical Exam:  Vitals/ General Appearance:   Weight/BMI: Body mass index is 21.03 kg/m².  /76   Pulse 71   Temp 37.1 °C (98.8 °F) (Oral)   Resp 16   Ht 1.676 m (5' 6\")   Wt 59.1 kg (130 lb 4.7 oz)   SpO2 98%   Vitals:    09/23/21 1730 09/23/21 2300 09/24/21 0421 09/24/21 1016   BP: 141/72 134/57 134/65 126/76   Pulse:  71 72 71   Resp:  18 18 16   Temp:  36.8 °C (98.2 °F) 36.7 °C (98 °F) 37.1 °C (98.8 °F)   TempSrc:  Oral Oral Oral   SpO2:  90% 96% 98%   Weight:       Height:         Oxygen Therapy:  Pulse Oximetry: 98 %, O2 (LPM): 0, O2 Delivery Device: None - Room " Air    Physical Exam  Vitals reviewed.   Constitutional:       General: She is not in acute distress.  HENT:      Head: Normocephalic and atraumatic.   Eyes:      General: Scleral icterus present.   Cardiovascular:      Rate and Rhythm: Regular rhythm.      Heart sounds: Normal heart sounds. No murmur heard.   No gallop.    Pulmonary:      Comments: Normal breath sounds anteriorly.  Abdominal:      Palpations: Abdomen is soft. There is no mass.      Tenderness: There is no abdominal tenderness.   Skin:     Coloration: Skin is jaundiced.   Neurological:      Comments: No asterixis.   Psychiatric:         Judgment: Judgment normal.         Review of Systems:  Review of Systems   Respiratory: Negative for cough and shortness of breath.    Cardiovascular: Negative for chest pain.   Gastrointestinal: Negative for abdominal pain, blood in stool, melena and vomiting.       Medical Decision Making, by Problem:  Active Hospital Problems    Diagnosis    • *Elevated liver enzymes [R74.8]    • Macrocytic anemia [D53.9]    • Hyponatremia [E87.1]    • Elevated troponin [R77.8]    • Hypothyroidism in adult [E03.9]    • Hypertension [I10]    • Left hip pain [M25.552]    • Uncontrolled type 1 diabetes mellitus (HCC) [E10.65]          Hyaden Zavala M.D.

## 2021-09-24 NOTE — DISCHARGE PLANNING
Anticipated Discharge Disposition: Home     Action: Pt discussed during IDT rounds 9/23. Per Dr. Martinez pt being followed by GI and MRCP pending. No known d/c needs at this time. No six clicks score available.    Barriers to Discharge: None     Plan: Hospital Care Management to continue to follow for d/c needs       Care Transition Team Assessment    Information Source  Orientation Level: Oriented X4  Information Given By: Other (Comments)  Who is responsible for making decisions for patient? : Patient    Discharge Preparedness  What is your plan after discharge?: Home with help  What are your discharge supports?: Child  Prior Functional Level: Independent with Activities of Daily Living, Independent with Medication Management    Functional Assesment  Prior Functional Level: Independent with Activities of Daily Living, Independent with Medication Management    Finances  Financial Barriers to Discharge: No  Prescription Coverage: Yes    Vision / Hearing Impairment  Right Eye Vision: Impaired, Wears Glasses  Left Eye Vision: Impaired, Wears Glasses    Psychological Assessment  History of Substance Abuse: None  History of Psychiatric Problems: No    Discharge Risks or Barriers  Discharge risks or barriers?: No    Anticipated Discharge Information  Discharge Disposition: Discharged to home/self care (01)

## 2021-09-24 NOTE — PROGRESS NOTES
Hospital Medicine Daily Progress Note    Date of Service  9/24/2021    Chief Complaint  Anu Manuel is a 66 y.o. female admitted 9/22/2021 worsening jaundice and lower abdominal pain    Hospital Course  This is a 66-year-old female with a past medical history of hypertension, type 1 diabetes mellitus, surgical hypothyroidism admitted 9/22/2021 for worsening jaundice and lower abdominal pain.  She reports nausea, diarrhea, and mild intermittent cramping, generalized abdominal pain has been waxing and waning for approximately 3-4 weeks.  She saw her primary care provider who referred her to Dr. Zavala from gastroenterology whom she has seen in the past.  She had initial lab work as well as a CT abdomen which showed colitis and has appointment of follow-up Dr. Zavala in 11/2021. Per patient, colonoscopy in recent years showed ulceration of colon. She followed up with her primary care provider on 9/22/2021 who noticed patient's jaundice worsening and was referred to the emergency department for further evaluation.  Lab work is significant for , , alk phos of 8/25/1939, total bilirubin 7.7.  US-RUQ 9/22/2021 showed likely hepatic steatosis and cholelithiasis but no evidence of acute cholecystitis. CT abdomen pelvis showed wall thickening throughout the entire colon anomaly in the cecum, descending, and sigmoid colon-similar to patient's prior CT scan.  MRI abdomen pending.    Interval Problem Update  9/23/2021: Patient reports continued diarrhea but denies any abdominal pain at this time, nausea, vomiting. MRI abdomen pending.     9/24/2021: Patient seen by GI.  No test pending.  Abdominal MRI today.    I have personally seen and examined the patient at bedside. I discussed the plan of care with patient, bedside RN, GI and Dr. Landis.    Consultants/Specialty  GI    Code Status  Full Code    Disposition  Patient is not medically cleared.   Anticipate discharge to to home with close  outpatient follow-up.  I have placed the appropriate orders for post-discharge needs.    Review of Systems  Review of Systems   Constitutional: Positive for weight loss (23 lbs since 03/2021). Negative for chills, fever and malaise/fatigue.   HENT: Negative for congestion and sore throat.    Respiratory: Negative for cough, hemoptysis, sputum production and shortness of breath.    Cardiovascular: Negative for chest pain, palpitations, orthopnea and leg swelling.   Gastrointestinal: Positive for diarrhea, heartburn and nausea. Negative for abdominal pain, blood in stool, constipation and vomiting.   Genitourinary: Negative for dysuria, flank pain and hematuria.   Musculoskeletal: Negative for falls and myalgias.   Skin: Positive for itching. Negative for rash.   Neurological: Negative for dizziness, sensory change, weakness and headaches.   Psychiatric/Behavioral: Positive for depression. Negative for suicidal ideas. The patient has insomnia (restlessness). The patient is not nervous/anxious.         Physical Exam  Temp:  [36.7 °C (98 °F)-37.3 °C (99.1 °F)] 37.1 °C (98.8 °F)  Pulse:  [71-79] 71  Resp:  [16-18] 16  BP: (126-171)/(57-85) 126/76  SpO2:  [90 %-98 %] 98 %    Physical Exam  Vitals and nursing note reviewed.   Constitutional:       General: She is not in acute distress.     Appearance: She is normal weight. She is not diaphoretic.   HENT:      Head: Normocephalic and atraumatic.      Nose: Nose normal.      Mouth/Throat:      Mouth: Mucous membranes are moist.      Pharynx: Oropharynx is clear.   Eyes:      General: Scleral icterus present.      Extraocular Movements: Extraocular movements intact.      Conjunctiva/sclera: Conjunctivae normal.      Pupils: Pupils are equal, round, and reactive to light.   Cardiovascular:      Rate and Rhythm: Normal rate and regular rhythm.      Pulses: Normal pulses.      Heart sounds: Normal heart sounds. No murmur heard.   No gallop.    Pulmonary:      Effort: Pulmonary  effort is normal. No respiratory distress.      Breath sounds: Normal breath sounds.   Chest:      Chest wall: No tenderness.   Abdominal:      General: Abdomen is flat. Bowel sounds are normal.      Palpations: Abdomen is soft. There is hepatomegaly. There is no splenomegaly.      Tenderness: There is no abdominal tenderness.   Musculoskeletal:         General: No deformity. Normal range of motion.      Cervical back: Normal range of motion and neck supple.      Right lower leg: No edema.      Left lower leg: No edema.   Skin:     General: Skin is warm and dry.      Capillary Refill: Capillary refill takes less than 2 seconds.      Coloration: Skin is jaundiced.      Findings: No bruising.   Neurological:      General: No focal deficit present.      Mental Status: She is alert and oriented to person, place, and time. Mental status is at baseline.   Psychiatric:         Mood and Affect: Mood normal.         Behavior: Behavior normal.         Thought Content: Thought content normal.         Judgment: Judgment normal.         Fluids    Intake/Output Summary (Last 24 hours) at 9/24/2021 1520  Last data filed at 9/24/2021 1300  Gross per 24 hour   Intake 980 ml   Output --   Net 980 ml       Laboratory     9/22/2021 20:13 9/23/2021 05:00 9/24/2021 10:31   WBC 9.9 9.3 9.7   RBC 3.59 (L) 3.39 (L) 3.47 (L)   Hemoglobin 11.3 (L) 10.6 (L) 11.0 (L)   Hematocrit 35.6 (L) 33.0 (L) 34.6 (L)   MCV 99.2 (H) 97.3 99.7 (H)   MCH 31.5 31.3 31.7   MCHC 31.7 (L) 32.1 (L) 31.8 (L)   RDW 51.9 (H) 50.4 (H) 48.5   Platelet Count 307 286 317   MPV 11.0 11.4 11.5   Neutrophils-Polys 73.50 (H)  72.20 (H)   Neutrophils (Absolute) 7.29 (H)  6.96   Lymphocytes 11.30 (L)  12.70 (L)   Lymphs (Absolute) 1.12  1.23   Monocytes 12.10  10.60   Monos (Absolute) 1.20 (H)  1.02 (H)   Eosinophils 1.60  3.00   Eos (Absolute) 0.16  0.29   Basophils 0.90  1.20   Baso (Absolute) 0.09  0.12   Immature Granulocytes 0.60  0.30   Immature Granulocytes (abs) 0.06   0.03   Nucleated RBC 0.00  0.00   NRBC (Absolute) 0.00  0.00        9/23/2021 05:00 9/23/2021 14:03 9/24/2021 10:31   Sodium 130 (L) 131 (L) 132 (L)   Potassium 4.3 4.6 4.1   Chloride 96 99 98   Co2 20 21 22   Anion Gap 14.0  12.0   Glucose 422 (H) 295 (H) 182 (H)   Bun 16 16 20   Creatinine 0.74 0.67 0.66   GFR If African American >60 >60 >60   GFR If Non  >60 >60 >60   Calcium 8.2 (L) 8.0 (L) 8.5   AST(SGOT) 274 (H)  257 (H)   ALT(SGPT) 241 (H)  233 (H)   Alkaline Phosphatase 2405 (H)  2594 (H)   Total Bilirubin 7.5 (H)  8.4 (H)   Albumin 2.6 (L) 2.5 (L) 3.1 (L)   Total Protein 5.9 (L)  6.8   Globulin 3.3  3.7 (H)   A-G Ratio 0.8  0.8   Phosphorus  2.4 (L) 2.9   Magnesium  1.8 1.7   Lipase   19   Ammonia   48 (H)        9/22/2021 20:13 9/24/2021 10:31   Lipase 20 19      9/22/2021 23:26 9/24/2021 10:31   Ammonia 26 48 (H)        9/22/2021 20:13   Lactic Acid 1.7      9/23/2021 05:00   Glycohemoglobin 8.5 (H)        9/22/2021 20:13   Acetaminophen -Tylenol <5.0 (L)        9/22/2021 20:13 9/23/2021 01:40 9/23/2021 05:00   Troponin T 34 (H) 31 (H) 34 (H)        9/22/2021 20:13   PT 12.5   INR 1.01   APTT 27.3        9/22/2021 20:13   Stat C-Reactive Protein 2.85 (H)      9/22/2021 20:13   Procalcitonin 0.63 (H)      9/22/2021 20:13   Hepatitis A Virus Ab, IgM Non-Reactive   Hepatitis B Surface Antigen Non-Reactive   Hepatitis B Cors Ab,IgM Non-Reactive   Hepatitis C Antibody Non-Reactive      9/22/2021 20:40   Color Elida   Character Hazy (A)   Specific Gravity >=1.030   Ph 5.5   Glucose 500 (A)   Ketones Trace (A)   Bilirubin Large (A)   Occult Blood Trace (A)   Protein 100 (A)   Nitrite Negative   Leukocyte Esterase Negative   Micro Urine Req Microscopic   WBC 2-5   RBC 2-5 (A)   Epithelial Cells Few   Bacteria Few (A)   Mucous Threads Few   Ca Oxalate Crystal Few   Hyaline Cast 0-2     Imaging  MR-ABDOMEN-WITH & W/O   Final Result      1.  No hepatic or pancreatic mass demonstrated.   2.  No  biliary dilation.            US-LIVER AND VESSELS COMPLETE (COMBO)   Final Result         1. No portal of hepatic vein thrombosis.   2. Cholelithiasis.   3. Contracted gallbladder with mildly irregular gallbladder wall thickening.      CT-ABDOMEN-PELVIS WITH   Final Result         1. Wall thickening throughout the entire colon, most in the cecum, descending and sigmoid colon, similar to prior      DX-CHEST-PORTABLE (1 VIEW)   Final Result         1. No acute cardiopulmonary abnormalities are identified.      US-RUQ   Final Result      1. Mildly echogenic liver, most commonly hepatic steatosis.   2. Cholelithiasis. No sonographic evidence of acute cholecystitis.              Assessment/Plan  * Elevated liver enzymes- (present on admission)  Assessment & Plan  Significant elevation in alk phos and total bilirubin is previous blood work on 9/14/2021  --> 326-> 274-->257  --> 280--> 241--> 233  Alk phos 1190--> 2541--> 2405--> 2594  TBili 2.3--> 7.7--> 7.5-->8.4  Ammonia 26-->48. No neurological symptoms.    Consider lactulose should ammonia worsen and neurological symptoms develop.  Imaging unclear etiology  MRI abdomen negative for mass or malignancy  Seen by GI   Recommended AFP, CEA, CA 19-9, SCARLETT, ASMA, AMA, GGT, IgM and possible colonoscopy/EGD during hospitalization  Continue to monitor    Elevated troponin- (present on admission)  Assessment & Plan  No chest pain.   Unlikely cardiac related, patient is a chronic elevated troponin, levels at baseline.  Continue to monitor      Hyponatremia- (present on admission)  Assessment & Plan  Mild, likely due to dehydration  Continue IV fluids  Continue to monitor    Macrocytic anemia- (present on admission)  Assessment & Plan  Mild, no signs of active bleeding.  Continue to monitor.    Hypothyroidism in adult- (present on admission)  Assessment & Plan  -Continue home Synthroid at 225 mcg daily  -Most recent TSH was approximately 2 months ago was normal at  2.19    Hypertension- (present on admission)  Assessment & Plan  -Continue home amlodipine and metoprolol  -Start as needed clonidine, enalapril and labetalol  -Adjust as needed    Left hip pain- (present on admission)  Assessment & Plan  -I think this is unlikely to be metastatic disease considering it has a burning sensation down the anterior aspect of her left leg however if it persists or worsens or causes her difficulty ambulating could do additional imaging but no additional work-up at this time      Uncontrolled type 1 diabetes mellitus (HCC)- (present on admission)  Assessment & Plan  A1c 8.5  Continue basal Lantus and SSI           VTE prophylaxis: enoxaparin ppx    I have performed a physical exam and reviewed and updated ROS and Plan today (9/24/2021). In review of yesterday's note (9/23/2021), there are no changes except as documented above.

## 2021-09-24 NOTE — PROGRESS NOTES
SUMMARY  Anu Manuel was admitted through the ER to evaluate jaundice for about 3 days.  She noticed dark urine during the last 2-3-3 weeks.  She also has early satiety and recent nausea.  She lost 23 pounds since 3/2021.  She denied supplements and mushroom ingestion.    Selected Labs  8/2020: LT: 117-28-39-0.3  9/2020: LT: 499--0.4, lipase 7  2/2021: LT: 141-37-45-0.4,  , GFR 44  3/2021: -27-37-0.3,   with high liver fraction 104 (0-94).  5/27/2021: LT: 90-30-33-0.3  9/14/2021: LT: 2608-601-802-2.3-1.6  9/22/2021: LT: 1082-328-074-7.7, albumin 3.4, INR 1.0, PTT 27    Liver evaluation in 9/2021  Negative tests:  .  Acute hepatitis panel  .  Ferritin 189    Imaging  APCT 9/17/2021: Mildly thick wall of cecum, descending and sigmoid colon.  Possible Crohn's disease.    APCT 9/22/2021:  Contracted gallbladder.  Normal liver.  Diffusely thick colon wall, mostly in the cecum, descending and sigmoid colon, similar to the prior CT.    US abdomen 9/22/2021: Gallstones.  Contracted gallbladder.  CBD diameter 4 mm.  Echogenic liver.        ASSESSMENT  Elevated liver tests  .  Mixed pattern, both cholestasis and hepatocellular.  The cause is unclear.  ---Evaluate for the cause, as below.    Mildly thick colon wall on CT  ---Evaluate with colonoscopy at the optimal timing.    Lower abdominal pain  . In 6/2021 she developed almost constant mild lower abdominal pain.  . Rule out an organic cause.    Colon cancer screening  . COL 5/2018:  Poor preparation.  Segmental sigmoid colitis with benign ulceration.  .  There is a history of colon polyps, but the validity is unclear.  ---Because of the poor preparation, consider a colonoscopy when the Brilinta can be stopped. Discuss this issue with her later.      PLAN  Await the MRCP  Amylase, lipase  Ammonia level  Total HBcAb  HBsAb  Hepatitis E antibodies  EBV titers  CMV antibodies  Herpes simplex antibodies  Varicella-zoster  antibodies  Ceruloplasmin  SCARLETT  F-actin antibody  Liver kidney microsomal antibody (not available)  IgG level  AMA  Doppler ultrasound to rule out Budd-Chiari  Consider a liver biopsy after the above.    Illnesses  Possible colon polyp history  CAD:  Unstable angina 8/2020, treated with 2 drug-eluting stents to the LAD  Hypertension  Diabetes mellitus  Hypothyroidism    Operations  Coronary stenting  Appendectomy 2008  Hysterectomy 2008  Back surgery 2016  Shoulder surgery twice  Neck surgery twice  Back stimulator removal due to staph infection  Tonsillectomy

## 2021-09-24 NOTE — PROGRESS NOTES
COVID-19 surge in effect.    Report received from NOC RN, assumed care of pt.   POC and medications reviewed with pt. Pt verbalized understanding.   AOx4  MRI at 0830.  Denies pain, SOB, or dizziness at this time.   Safety measures in place.  Hourly rounding in place.

## 2021-09-25 ENCOUNTER — APPOINTMENT (OUTPATIENT)
Dept: RADIOLOGY | Facility: MEDICAL CENTER | Age: 66
DRG: 445 | End: 2021-09-25
Attending: INTERNAL MEDICINE
Payer: MEDICARE

## 2021-09-25 LAB
AFP-TM SERPL-MCNC: 6 NG/ML (ref 0–9)
ALBUMIN SERPL BCP-MCNC: 2.9 G/DL (ref 3.2–4.9)
ALBUMIN/GLOB SERPL: 0.9 G/DL
ALP SERPL-CCNC: 2424 U/L (ref 30–99)
ALT SERPL-CCNC: 198 U/L (ref 2–50)
ANION GAP SERPL CALC-SCNC: 11 MMOL/L (ref 7–16)
AST SERPL-CCNC: 226 U/L (ref 12–45)
BASOPHILS # BLD AUTO: 1.2 % (ref 0–1.8)
BASOPHILS # BLD: 0.12 K/UL (ref 0–0.12)
BILIRUB SERPL-MCNC: 8.6 MG/DL (ref 0.1–1.5)
BUN SERPL-MCNC: 19 MG/DL (ref 8–22)
CALCIUM SERPL-MCNC: 8.4 MG/DL (ref 8.4–10.2)
CHLORIDE SERPL-SCNC: 100 MMOL/L (ref 96–112)
CO2 SERPL-SCNC: 20 MMOL/L (ref 20–33)
CREAT SERPL-MCNC: 0.69 MG/DL (ref 0.5–1.4)
EOSINOPHIL # BLD AUTO: 0.27 K/UL (ref 0–0.51)
EOSINOPHIL NFR BLD: 2.6 % (ref 0–6.9)
ERYTHROCYTE [DISTWIDTH] IN BLOOD BY AUTOMATED COUNT: 47.4 FL (ref 35.9–50)
GLOBULIN SER CALC-MCNC: 3.2 G/DL (ref 1.9–3.5)
GLUCOSE BLD-MCNC: 125 MG/DL (ref 65–99)
GLUCOSE BLD-MCNC: 156 MG/DL (ref 65–99)
GLUCOSE BLD-MCNC: 286 MG/DL (ref 65–99)
GLUCOSE BLD-MCNC: 441 MG/DL (ref 65–99)
GLUCOSE SERPL-MCNC: 153 MG/DL (ref 65–99)
HCT VFR BLD AUTO: 33.5 % (ref 37–47)
HGB BLD-MCNC: 10.7 G/DL (ref 12–16)
IMM GRANULOCYTES # BLD AUTO: 0.06 K/UL (ref 0–0.11)
IMM GRANULOCYTES NFR BLD AUTO: 0.6 % (ref 0–0.9)
LYMPHOCYTES # BLD AUTO: 1.29 K/UL (ref 1–4.8)
LYMPHOCYTES NFR BLD: 12.4 % (ref 22–41)
MCH RBC QN AUTO: 31.3 PG (ref 27–33)
MCHC RBC AUTO-ENTMCNC: 31.9 G/DL (ref 33.6–35)
MCV RBC AUTO: 98 FL (ref 81.4–97.8)
MONOCYTES # BLD AUTO: 1.32 K/UL (ref 0–0.85)
MONOCYTES NFR BLD AUTO: 12.7 % (ref 0–13.4)
NEUTROPHILS # BLD AUTO: 7.34 K/UL (ref 2–7.15)
NEUTROPHILS NFR BLD: 70.5 % (ref 44–72)
NRBC # BLD AUTO: 0 K/UL
NRBC BLD-RTO: 0 /100 WBC
PLATELET # BLD AUTO: 315 K/UL (ref 164–446)
PMV BLD AUTO: 11.7 FL (ref 9–12.9)
POTASSIUM SERPL-SCNC: 3.6 MMOL/L (ref 3.6–5.5)
PROT SERPL-MCNC: 6.1 G/DL (ref 6–8.2)
RBC # BLD AUTO: 3.42 M/UL (ref 4.2–5.4)
SODIUM SERPL-SCNC: 131 MMOL/L (ref 135–145)
WBC # BLD AUTO: 10.4 K/UL (ref 4.8–10.8)

## 2021-09-25 PROCEDURE — 700102 HCHG RX REV CODE 250 W/ 637 OVERRIDE(OP): Performed by: NURSE PRACTITIONER

## 2021-09-25 PROCEDURE — 770006 HCHG ROOM/CARE - MED/SURG/GYN SEMI*

## 2021-09-25 PROCEDURE — 85025 COMPLETE CBC W/AUTO DIFF WBC: CPT

## 2021-09-25 PROCEDURE — 700102 HCHG RX REV CODE 250 W/ 637 OVERRIDE(OP): Performed by: INTERNAL MEDICINE

## 2021-09-25 PROCEDURE — 82962 GLUCOSE BLOOD TEST: CPT | Mod: 91

## 2021-09-25 PROCEDURE — A9270 NON-COVERED ITEM OR SERVICE: HCPCS | Performed by: INTERNAL MEDICINE

## 2021-09-25 PROCEDURE — 80053 COMPREHEN METABOLIC PANEL: CPT

## 2021-09-25 PROCEDURE — 700111 HCHG RX REV CODE 636 W/ 250 OVERRIDE (IP): Performed by: INTERNAL MEDICINE

## 2021-09-25 PROCEDURE — A9270 NON-COVERED ITEM OR SERVICE: HCPCS | Performed by: NURSE PRACTITIONER

## 2021-09-25 PROCEDURE — 36415 COLL VENOUS BLD VENIPUNCTURE: CPT

## 2021-09-25 PROCEDURE — U0005 INFEC AGEN DETEC AMPLI PROBE: HCPCS

## 2021-09-25 PROCEDURE — 99232 SBSQ HOSP IP/OBS MODERATE 35: CPT | Performed by: INTERNAL MEDICINE

## 2021-09-25 PROCEDURE — 74181 MRI ABDOMEN W/O CONTRAST: CPT | Mod: MG

## 2021-09-25 PROCEDURE — U0003 INFECTIOUS AGENT DETECTION BY NUCLEIC ACID (DNA OR RNA); SEVERE ACUTE RESPIRATORY SYNDROME CORONAVIRUS 2 (SARS-COV-2) (CORONAVIRUS DISEASE [COVID-19]), AMPLIFIED PROBE TECHNIQUE, MAKING USE OF HIGH THROUGHPUT TECHNOLOGIES AS DESCRIBED BY CMS-2020-01-R: HCPCS

## 2021-09-25 RX ADMIN — LEVOTHYROXINE SODIUM 200 MCG: 0.05 TABLET ORAL at 06:07

## 2021-09-25 RX ADMIN — METOPROLOL SUCCINATE 200 MG: 25 TABLET, EXTENDED RELEASE ORAL at 06:07

## 2021-09-25 RX ADMIN — NICOTINE TRANSDERMAL SYSTEM 21 MG: 21 PATCH, EXTENDED RELEASE TRANSDERMAL at 06:06

## 2021-09-25 RX ADMIN — ENOXAPARIN SODIUM 40 MG: 40 INJECTION SUBCUTANEOUS at 06:04

## 2021-09-25 RX ADMIN — LORAZEPAM 1 MG: 0.5 TABLET ORAL at 12:24

## 2021-09-25 RX ADMIN — LEVOTHYROXINE SODIUM 25 MCG: 0.05 TABLET ORAL at 06:06

## 2021-09-25 RX ADMIN — INSULIN HUMAN 5 UNITS: 100 INJECTION, SOLUTION PARENTERAL at 16:09

## 2021-09-25 RX ADMIN — CALCIUM CARBONATE (ANTACID) CHEW TAB 500 MG 500 MG: 500 CHEW TAB at 02:12

## 2021-09-25 RX ADMIN — INSULIN GLARGINE 15 UNITS: 100 INJECTION, SOLUTION SUBCUTANEOUS at 16:09

## 2021-09-25 RX ADMIN — ASPIRIN 81 MG: 81 TABLET, COATED ORAL at 06:06

## 2021-09-25 RX ADMIN — AMLODIPINE BESYLATE 5 MG: 5 TABLET ORAL at 06:06

## 2021-09-25 RX ADMIN — INSULIN HUMAN 9 UNITS: 100 INJECTION, SOLUTION PARENTERAL at 20:18

## 2021-09-25 RX ADMIN — INSULIN HUMAN 2 UNITS: 100 INJECTION, SOLUTION PARENTERAL at 06:12

## 2021-09-25 RX ADMIN — SERTRALINE 100 MG: 50 TABLET, FILM COATED ORAL at 20:14

## 2021-09-25 RX ADMIN — LORAZEPAM 1 MG: 0.5 TABLET ORAL at 20:21

## 2021-09-25 ASSESSMENT — ENCOUNTER SYMPTOMS
FALLS: 0
SORE THROAT: 0
HEADACHES: 0
INSOMNIA: 1
BLOOD IN STOOL: 0
FLANK PAIN: 0
COUGH: 0
ORTHOPNEA: 0
DEPRESSION: 1
HEMOPTYSIS: 0
NERVOUS/ANXIOUS: 0
SHORTNESS OF BREATH: 0
ABDOMINAL PAIN: 0
MYALGIAS: 0
WEIGHT LOSS: 1
WEAKNESS: 0
NAUSEA: 1
VOMITING: 0
HEARTBURN: 1
DIZZINESS: 0
FEVER: 0
DIARRHEA: 1
CHILLS: 0
SENSORY CHANGE: 0

## 2021-09-25 ASSESSMENT — PAIN DESCRIPTION - PAIN TYPE: TYPE: ACUTE PAIN

## 2021-09-25 NOTE — PROGRESS NOTES
Gastroenterology Progress Note     Author: Hayden Zavala M.D.     Date & Time Created: 9/25/2021 12:43 PM    Patient ID:  Name:             Anu Manuel   YOB: 1955  Age:                 66 y.o.  female   MRN:               [id number]    Chief Complaint:     Jaundice      Current Illness:  Anu Manuel was admitted through the ER to evaluate jaundice for about 3 days.  She noticed dark urine during the last 2-3-3 weeks.  She also has early satiety and recent nausea.  She lost 23 pounds since 3/2021.  She denied supplements and mushroom ingestion.  .  Alcohol: 1 glass of wine per month.  No prior hepatitis.  No family history of liver disease.     Selected Labs  8/2020: LT: 117-28-39-0.3  9/2020: LT: 915--0.4, lipase 7  2/2021: LT: 141-37-45-0.4,  , GFR 44  3/2021: -27-37-0.3,   with high liver fraction 104 (0-94).  5/27/2021: LT: 90-30-33-0.3  9/14/2021: LT: 9793-080-578-2.3-1.6  9/22/2021: LT: 7790-421-945-7.7, albumin 3.4, INR 1.0, PTT 27     Liver evaluation in 9/2021  Negative tests:  .  Lipase  .  Acute hepatitis panel  .  Total HBcAb  .  HBsAb  .  Iron saturation, ferritin  .  MRCP  Positive tests:  .  Ammonia 26 on 9/22, elevated at 48 on 9/24.    Pending tests:  Hepatitis E antibodies  EBV titers  CMV antibodies  Herpes simplex antibodies  Varicella-zoster antibodies  Ceruloplasmin  SCARLETT  F-actin antibody  IgG level  AMA     Imaging  APCT 9/17/2021: Mildly thick wall of cecum, descending and sigmoid colon.  Possible Crohn's disease.     APCT 9/22/2021:  Contracted gallbladder.  Normal liver.  Diffusely thick colon wall, mostly in the cecum, descending and sigmoid colon, similar to the prior CT.     US abdomen 9/22/2021: Gallstones.  Contracted gallbladder.  CBD diameter 4 mm.  Echogenic liver.    US Doppler liver vessels 9/24/2021: Patent hepatic vein, portal vein and splenic vein.  Gallstones.    MRI pancreas: No liver or pancreatic mass.  No  biliary dilation.  Normal patent hepatic veins.    MRCP: Hepatomegaly.  Normal MRCP.      Interval History:  9/24/2021  .  Her symptoms are the same.  No new symptoms.  .  Discussed the MRI pancreas with Dr. Jean.  The MRI was ordered as MRI of the pancreas, and not an MRCP.    9/25/2021  .  Stable.  No new symptoms.  Awaiting additional labs.  Awaiting transfer to [facility].    Assessment:  Transfer to [facility] hepatology  .  Discussed her condition with [facility] hepatologist Dr. Saleem Herrera, who think she should be transferred to [facility] for additional evaluation and treatment.  I agree.  I discussed this option with her, and with her daughter, who listened in by phone, and they both agree.    Elevated liver tests  .  Mixed pattern, both cholestasis and hepatocellular.  The cause is unclear.  ---Await the pending lab tests.    Elevated ammonia level  .  Ammonia 26 on 9/22, elevated at 48 on 9/24.  ---Follow the ammonia level.     Mildly thick colon wall on CT  ---Evaluate with colonoscopy at the optimal timing.     Lower abdominal pain  . In 6/2021 she developed almost constant mild lower abdominal pain.  . Rule out an organic cause.     Colon cancer screening  . COL 5/2018:  Poor preparation.  Segmental sigmoid colitis with benign ulceration.  .  There is a history of colon polyps, but the validity is unclear.  ---Because of the poor preparation, consider a colonoscopy at the optimal timing. Discuss this issue with her later.    Recommendations:  Transfer to [facility] hepatology when it can be arranged.  ---Send a DVD (containing the MRI from 9/24 and MRCP from 9/26 images) to [facility] with her.      Labs:  Recent Labs     09/23/21  0500 09/24/21  1031 09/25/21  0442   WBC 9.3 9.7 10.4   RBC 3.39* 3.47* 3.42*   HEMOGLOBIN 10.6* 11.0* 10.7*   HEMATOCRIT 33.0* 34.6* 33.5*   MCV 97.3 99.7* 98.0*   MCH 31.3 31.7 31.3   MCHC 32.1* 31.8* 31.9*   RDW 50.4* 48.5 47.4   PLATELETCT 286 317 315   MPV 11.4 11.5 11.7      Recent Labs      09/22/21 2013   APTT 27.3   INR 1.01   review  Recent Labs     09/23/21  1403 09/24/21  1031 09/25/21  0442   SODIUM 131* 132* 131*   POTASSIUM 4.6 4.1 3.6   CHLORIDE 99 98 100   CO2 21 22 20   GLUCOSE 295* 182* 153*   BUN 16 20 19   CREATININE 0.67 0.66 0.69   CALCIUM 8.0* 8.5 8.4     Recent Labs     09/22/21 2013 09/22/21 2013 09/23/21  0500 09/23/21  0500 09/23/21  1403 09/24/21  1031 09/25/21  0442   ALTSGPT 280*   < > 241*  --   --  233* 198*   ASTSGOT 326*   < > 274*  --   --  257* 226*   ALKPHOSPHAT 2541*   < > 2405*  --   --  2594* 2424*   TBILIRUBIN 7.7*   < > 7.5*  --   --  8.4* 8.6*   GAMMAGT  --   --   --   --   --  1191*  --    LIPASE 20  --   --   --   --  19  --    GLUCOSE 259*   < > 422*   < > 295* 182* 153*    < > = values in this interval not displayed.       No results found for: BLOODCULTU, BLDCULT, BCHOLD     GI/Nutrition:  Orders Placed This Encounter   Procedures   • Diet Order Diet: Consistent CHO (Diabetic)     Standing Status:   Standing     Number of Occurrences:   1     Order Specific Question:   Diet:     Answer:   Consistent CHO (Diabetic) [4]       Hospital Medications:  IV infusions:  None  amLODIPine, 5 mg, Oral, Q DAY  aspirin, 81 mg, Oral, QAM  metoprolol SR, 200 mg, Oral, DAILY  levothyroxine, 200 mcg, Oral, QDAY  levothyroxine, 25 mcg, Oral, AM ES  senna-docusate, 2 Tablet, Oral, BID  enoxaparin, 40 mg, Subcutaneous, DAILY  insulin regular, 2-9 Units, Subcutaneous, 4X/DAY ACHS  insulin glargine, 15 Units, Subcutaneous, Q EVENING  nicotine, 21 mg, Transdermal, Daily-0600  sertraline, 100 mg, Oral, QHS      PRN medications: LORazepam, calcium carbonate, senna-docusate **AND** polyethylene glycol/lytes **AND** magnesium hydroxide **AND** bisacodyl, cloNIDine, enalaprilat, labetalol, ondansetron, ondansetron, promethazine, promethazine, prochlorperazine, insulin regular **AND** POC blood glucose manual result **AND** NOTIFY MD and PharmD **AND** glucose **AND** dextrose 50%,  "nicotine **AND** Nicotine Replacement Patient Education Materials **AND** nicotine polacrilex    Fluids:    Intake/Output Summary (Last 24 hours) at 9/24/2021 1523  Last data filed at 9/24/2021 1300  Gross per 24 hour   Intake 980 ml   Output --   Net 980 ml          Past Medical History:   Past Medical History:   Diagnosis Date   • Adverse effect of anesthesia     in 2008 \"throat closes up\"\"anxiety\" ?laryngospasm, kept in ICU. Pt states no problems currently 2018.    • Anesthesia     in 2008 \"throat closes up\"\"anxiety\" ?laryngospasm, kept in ICU. Pt states no problems currently 2018.    • Arthritis     right shoulder, hands   • Cigarette smoker quit 2013   • Dental disorder     missing teeth    • depression 8/30/2016    denies depression, states has anxiety and panic attacks   • Diabetes mellitus type 1 (HCC) 1989    insulin   • Encounter for long-term (current) use of insulin ([facility]) 9/25/2013   • Heart burn    • High cholesterol    • Hypertension    • Hypothyroidism, postsurgical 1970   • Indigestion    • Infectious disease      had hepatitis C, tested neg.   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.63) 6/10/2015       Past Surgical History:  Past Surgical History:   Procedure Laterality Date   • CATH PLACEMENT  1/25/2020    Procedure: INSERTION, CATHETER PERM;  Surgeon: Rola Mendoza M.D.;  Location: SURGERY [facility];  Service: General   • IRRIGATION & DEBRIDEMENT NEURO  1/19/2020    Procedure: IRRIGATION AND DEBRIDEMENT, WOUND THORACIC AND LUMBAR;  Surgeon: Ryan Roman M.D.;  Location: SURGERY [facility];  Service: Neurosurgery   • PB IMPLANT NEUROSTIM/ N/A 12/16/2019    Procedure: EXPLORATION AT THORACIC 8 - 9, REPLACEMENT OF  NEUROSTIMULATOR LEAD;  Surgeon: " "Ryan Roman M.D.;  Location: SURGERY [facility];  Service: Neurosurgery   • SPINAL CORD STIMULATOR N/A 10/26/2018    Procedure: SPINAL CORD STIMULATOR;  Surgeon: Ryan Roman M.D.;  Location: SURGERY [facility];  Service: Neurosurgery   • THORACIC LAMINECTOMY N/A 10/26/2018    Procedure: THORACIC LAMINECTOMY - FOR;  Surgeon: Ryan Roman M.D.;  Location: SURGERY [facility];  Service: Neurosurgery   • [place] NJX AA&/STRD TFRML EPI LUMBAR/SACRAL 1 LEVEL Right 8/31/2016    Procedure: INJ-FORAMEN EPI LUM/SAC SNGL L4-5;  Surgeon: Sukhi Godfrey M.D.;  Location: [facility];  Service: Pain Management   • LUMBAR LAMINECTOMY DISKECTOMY Right 5/10/2016    Procedure: LUMBAR L4-5 HEMILAMINECTOMY DISKECTOMY ;  Surgeon: Arnold Keyes M.D.;  Location: [facility];  Service:    • CERVICAL FUSION POSTERIOR  1/16/2009    Performed by TARA CONTRERAS at [facility]   • HARDWARE REMOVAL NEURO  1/16/2009    Performed by TARA CONTRERAS at [facility]   • NECK EXPLORATION  1/16/2009    Performed by TARA CONTRERAS at [facility]   • SHOULDER ARTHROSCOPY W/ ROTATOR CUFF REPAIR  10/9/08    Performed by SHERLY CASTANEDA at [facility]   • SHOULDER DECOMPRESSION ARTHROSCOPIC  6/17/08    Performed by SHERLY CASTANEDA at [facility]   • CLAVICLE DISTAL EXCISION  6/17/08    Performed by SHERLY CASTANEDA at [facility]   • CERVICAL DISK AND FUSION ANTERIOR  03/12/08   • HYSTERECTOMY, VAGINAL  2006   • APPENDECTOMY  2004   • THYROID LOBECTOMY  1973   • TONSILLECTOMY  1963   • ABDOMINAL HYSTERECTOMY TOTAL     • LUMPECTOMY  1976, 2005    Breast    • LUMPECTOMY         Physical Exam:  Vitals/ General Appearance:   Weight/BMI: Body mass index is 21.03 kg/m².  /67   Pulse 65   Temp 36.7 °C (98 °F) (Oral)   Resp 17   Ht 1.676 m (5' 6\")   Wt 59.1 kg (130 lb 4.7 oz)   SpO2 96%   Vitals:    09/24/21 1625 09/24/21 2200 " 09/25/21 0600 09/25/21 1010   BP: 141/66 141/66 140/63 118/67   Pulse: 69 74 67 65   Resp: 17 17 15 17   Temp: 36.7 °C (98 °F) 37.5 °C (99.5 °F) 36.9 °C (98.4 °F) 36.7 °C (98 °F)   TempSrc: Oral Oral Oral Oral   SpO2: 95% 92% 92% 96%   Weight:       Height:         Oxygen Therapy:  Pulse Oximetry: 96 %, O2 (LPM): 0, O2 Delivery Device: None - Room Air    Physical Exam  Vitals reviewed.   Constitutional:       General: She is not in acute distress.  HENT:      Head: Normocephalic and atraumatic.   Eyes:      General: Scleral icterus present.   Cardiovascular:      Rate and Rhythm: Regular rhythm.      Heart sounds: Normal heart sounds. No murmur heard.   No gallop.    Pulmonary:      Comments: Normal breath sounds anteriorly.  Abdominal:      Palpations: Abdomen is soft. There is no mass.      Tenderness: There is no abdominal tenderness.   Skin:     Coloration: Skin is jaundiced.   Neurological:      Comments: No asterixis.   Psychiatric:         Judgment: Judgment normal.         Review of Systems:  Review of Systems   Respiratory: Negative for cough and shortness of breath.    Cardiovascular: Negative for chest pain.   Gastrointestinal: Negative for abdominal pain, blood in stool, melena and vomiting.       Medical Decision Making, by Problem:  Active Hospital Problems    Diagnosis    • *Elevated liver enzymes [R74.8]    • Macrocytic anemia [D53.9]    • Hyponatremia [E87.1]    • Elevated troponin [R77.8]    • Hypothyroidism in adult [E03.9]    • Hypertension [I10]    • Left hip pain [M25.552]    • Uncontrolled type 1 diabetes mellitus (HCC) [E10.65]          Hayden Zavala M.D.

## 2021-09-25 NOTE — PROGRESS NOTES
Received a call from Caridad Choi from [facility] ([facility] [place] [place]). Per Caridad, she received a request from DR. Zavala to transfer pt to [facility] [place] [place]. Caridad stated that pt needs to have MRCP prior to transfer and requires to have following documents faxed to their facility; the documents include: medical history, latest progress note (from MD), imaging report, latest labs, MAR and recent Covid 19 test   Above documents must be faxed to [phone].  Caridad could be reached at [phone] for any questions or concerns.     Will inform day RN and case management about this transfer.

## 2021-09-25 NOTE — PROGRESS NOTES
Hospital Medicine Daily Progress Note    Date of Service  9/25/2021    Chief Complaint  Anu Manuel is a 66 y.o. female admitted 9/22/2021 worsening jaundice and lower abdominal pain    Hospital Course  This is a 66-year-old female with a past medical history of hypertension, type 1 diabetes mellitus, surgical hypothyroidism admitted 9/22/2021 for worsening jaundice and lower abdominal pain.  She reports nausea, diarrhea, and mild intermittent cramping, generalized abdominal pain has been waxing and waning for approximately 3-4 weeks.  She saw her primary care provider who referred her to Dr. Zavala from gastroenterology whom she has seen in the past.  She had initial lab work as well as a CT abdomen which showed colitis and has appointment of follow-up Dr. Zavala in 11/2021. Per patient, colonoscopy in recent years showed ulceration of colon. She followed up with her primary care provider on 9/22/2021 who noticed patient's jaundice worsening and was referred to the emergency department for further evaluation.  Lab work is significant for , , alk phos of 8[birth year]1941, total bilirubin 7.7.  US-RUQ 9/22/2021 showed likely hepatic steatosis and cholelithiasis but no evidence of acute cholecystitis. CT abdomen pelvis showed wall thickening throughout the entire colon anomaly in the cecum, descending, and sigmoid colon-similar to patient's prior CT scan.  MRI abdomen pending.    Interval Problem Update  9/23/2021: Patient reports continued diarrhea but denies any abdominal pain at this time, nausea, vomiting. MRI abdomen pending.     9/24/2021: Patient seen by GI.  No test pending.  Abdominal MRI today.    9/25/2021: Abdominal MRI negative for hepatic or pancreatic mass, no biliary dilation.  GI spoke with her hepatologist at [facility]- recommend transfer after MRCP.  Discussed with patient and daughter-both are agreeable.  MRCP pending.    I have personally seen and examined the patient at bedside.  I discussed the plan of care with patient, bedside RN, GI and Dr. Landis.    Consultants/Specialty  GI    Code Status  Full Code    Disposition  Patient is not medically cleared.   Anticipate discharge to to home with close outpatient follow-up.  I have placed the appropriate orders for post-discharge needs.    Review of Systems  Review of Systems   Constitutional: Positive for weight loss (23 lbs since 03/2021). Negative for chills, fever and malaise/fatigue.   HENT: Negative for congestion and sore throat.    Respiratory: Negative for cough, hemoptysis and shortness of breath.    Cardiovascular: Negative for chest pain, orthopnea and leg swelling.   Gastrointestinal: Positive for diarrhea, heartburn and nausea. Negative for abdominal pain and vomiting.        Beige/tan colored stools   Genitourinary: Negative for dysuria, flank pain and hematuria.   Musculoskeletal: Negative for falls and myalgias.   Skin: Positive for itching. Negative for rash.   Neurological: Negative for dizziness, sensory change, weakness and headaches.   Psychiatric/Behavioral: Positive for depression. Negative for suicidal ideas. The patient has insomnia (restlessness). The patient is not nervous/anxious.         Physical Exam  Temp:  [36.7 °C (98 °F)-37.5 °C (99.5 °F)] 36.9 °C (98.4 °F)  Pulse:  [67-74] 67  Resp:  [15-17] 15  BP: (126-141)/(63-76) 140/63  SpO2:  [92 %-98 %] 92 %    Physical Exam  Vitals and nursing note reviewed.   Constitutional:       General: She is not in acute distress.     Appearance: She is normal weight. She is not diaphoretic.   HENT:      Head: Normocephalic and atraumatic.      Nose: Nose normal.      Mouth/Throat:      Mouth: Mucous membranes are moist.      Pharynx: Oropharynx is clear.   Eyes:      General: Scleral icterus present.      Extraocular Movements: Extraocular movements intact.      Conjunctiva/sclera: Conjunctivae normal.      Pupils: Pupils are equal, round, and reactive to light.    Cardiovascular:      Rate and Rhythm: Normal rate and regular rhythm.      Pulses: Normal pulses.      Heart sounds: Normal heart sounds. No murmur heard.   No gallop.    Pulmonary:      Effort: Pulmonary effort is normal. No respiratory distress.      Breath sounds: Normal breath sounds.   Chest:      Chest wall: No tenderness.   Abdominal:      General: Abdomen is flat. Bowel sounds are normal.      Palpations: Abdomen is soft. There is hepatomegaly. There is no splenomegaly.      Tenderness: There is no abdominal tenderness.   Musculoskeletal:         General: No swelling or deformity. Normal range of motion.      Cervical back: Normal range of motion and neck supple.   Skin:     General: Skin is warm and dry.      Capillary Refill: Capillary refill takes less than 2 seconds.      Coloration: Skin is jaundiced.      Findings: No bruising.   Neurological:      General: No focal deficit present.      Mental Status: She is alert and oriented to person, place, and time. Mental status is at baseline.   Psychiatric:         Mood and Affect: Mood normal.         Behavior: Behavior normal.         Thought Content: Thought content normal.         Judgment: Judgment normal.      Comments: Tearful and anxious         Fluids    Intake/Output Summary (Last 24 hours) at 9/25/2021 0848  Last data filed at 9/25/2021 0800  Gross per 24 hour   Intake 600 ml   Output --   Net 600 ml       Laboratory       9/23/2021 05:00 9/24/2021 10:31 9/25/2021 04:42   WBC 9.3 9.7 10.4   RBC 3.39 (L) 3.47 (L) 3.42 (L)   Hemoglobin 10.6 (L) 11.0 (L) 10.7 (L)   Hematocrit 33.0 (L) 34.6 (L) 33.5 (L)   MCV 97.3 99.7 (H) 98.0 (H)   MCH 31.3 31.7 31.3   MCHC 32.1 (L) 31.8 (L) 31.9 (L)   RDW 50.4 (H) 48.5 47.4   Platelet Count 286 317 315   MPV 11.4 11.5 11.7   Neutrophils-Polys  72.20 (H) 70.50   Neutrophils (Absolute)  6.96 7.34 (H)   Lymphocytes  12.70 (L) 12.40 (L)   Lymphs (Absolute)  1.23 1.29   Monocytes  10.60 12.70   Monos (Absolute)  1.02  (H) 1.32 (H)   Eosinophils  3.00 2.60   Eos (Absolute)  0.29 0.27   Basophils  1.20 1.20   Baso (Absolute)  0.12 0.12   Immature Granulocytes  0.30 0.60   Immature Granulocytes (abs)  0.03 0.06   Nucleated RBC  0.00 0.00   NRBC (Absolute)  0.00 0.00      9/23/2021 14:03 9/24/2021 10:31 9/25/2021 04:42   Sodium 131 (L) 132 (L) 131 (L)   Potassium 4.6 4.1 3.6   Chloride 99 98 100   Co2 21 22 20   Anion Gap  12.0 11.0   Glucose 295 (H) 182 (H) 153 (H)   Bun 16 20 19   Creatinine 0.67 0.66 0.69   GFR If  >60 >60 >60   GFR If Non  >60 >60 >60   Calcium 8.0 (L) 8.5 8.4   AST(SGOT)  257 (H) 226 (H)   ALT(SGPT)  233 (H) 198 (H)   Alkaline Phosphatase  2594 (H) 2424 (H)   Total Bilirubin  8.4 (H) 8.6 (H)   Albumin 2.5 (L) 3.1 (L) 2.9 (L)   Total Protein  6.8 6.1   Globulin  3.7 (H) 3.2   A-G Ratio  0.8 0.9   Phosphorus 2.4 (L) 2.9    Magnesium 1.8 1.7         9/24/2021 10:31   Gamma Gt 1191 (H)        9/22/2021 20:13 9/24/2021 10:31   Lipase 20 19      9/22/2021 23:26 9/24/2021 10:31   Ammonia 26 48 (H)        9/22/2021 20:13   Lactic Acid 1.7      9/23/2021 05:00   Glycohemoglobin 8.5 (H)        9/22/2021 20:13   Acetaminophen -Tylenol <5.0 (L)        9/22/2021 20:13 9/23/2021 01:40 9/23/2021 05:00   Troponin T 34 (H) 31 (H) 34 (H)        9/22/2021 20:13   PT 12.5   INR 1.01   APTT 27.3        9/22/2021 20:13   Stat C-Reactive Protein 2.85 (H)      9/22/2021 20:13   Procalcitonin 0.63 (H)      9/22/2021 20:13   Hepatitis A Virus Ab, IgM Non-Reactive   Hepatitis B Surface Antigen Non-Reactive   Hepatitis B Cors Ab,IgM Non-Reactive   Hepatitis C Antibody Non-Reactive      9/22/2021 20:40   Color Elida   Character Hazy (A)   Specific Gravity >=1.030   Ph 5.5   Glucose 500 (A)   Ketones Trace (A)   Bilirubin Large (A)   Occult Blood Trace (A)   Protein 100 (A)   Nitrite Negative   Leukocyte Esterase Negative   Micro Urine Req Microscopic   WBC 2-5   RBC 2-5 (A)   Epithelial Cells Few   Bacteria  Few (A)   Mucous Threads Few   Ca Oxalate Crystal Few   Hyaline Cast 0-2     Imaging  MR-ABDOMEN-WITH & W/O   Final Result   Addendum 1 of 1   Hepatic veins are normal in caliber and enhance normally.      These findings were discussed with Dr. Zavala on 9/24/2021 3:20 PM.      Final      1.  No hepatic or pancreatic mass demonstrated.   2.  No biliary dilation.            US-LIVER AND VESSELS COMPLETE (COMBO)   Final Result         1. No portal of hepatic vein thrombosis.   2. Cholelithiasis.   3. Contracted gallbladder with mildly irregular gallbladder wall thickening.      CT-ABDOMEN-PELVIS WITH   Final Result         1. Wall thickening throughout the entire colon, most in the cecum, descending and sigmoid colon, similar to prior      DX-CHEST-PORTABLE (1 VIEW)   Final Result         1. No acute cardiopulmonary abnormalities are identified.      US-RUQ   Final Result      1. Mildly echogenic liver, most commonly hepatic steatosis.   2. Cholelithiasis. No sonographic evidence of acute cholecystitis.         RB-ZALGGBD-V/O    (Results Pending)        Assessment/Plan  * Elevated liver enzymes- (present on admission)  Assessment & Plan  Significant elevation in alk phos and total bilirubin is previous blood work on 9/14/2021  --> 326-> 274-->257--> 226  --> 280--> 241--> 233-->198  Alk phos 1190--> 2541--> 2405--> 2594--> 2424  TBili 2.3--> 7.7--> 7.5-->8.4-->8.6  Ammonia 26-->48. No neurological symptoms.      Imaging unclear etiology  MRI abdomen negative for mass or malignancy  Seen by GI   Recommended AFP, CEA, CA 19-9, SCARLETT, ASMA, AMA, GGT, IgM and possible colonoscopy/EGD during hospitalization   MRCP pending.  Continue to monitor    Elevated troponin- (present on admission)  Assessment & Plan  No chest pain.   Unlikely cardiac related, patient is a chronic elevated troponin, levels at baseline.  Continue to monitor      Hyponatremia- (present on admission)  Assessment & Plan  Mild, likely due to  dehydration  Continue IV fluids  Continue to monitor    Macrocytic anemia- (present on admission)  Assessment & Plan  Mild, no signs of active bleeding.  Continue to monitor.    Hypothyroidism in adult- (present on admission)  Assessment & Plan  -Continue home Synthroid at 225 mcg daily  -Most recent TSH was approximately 2 months ago was normal at 2.19    Hypertension- (present on admission)  Assessment & Plan  -Continue home amlodipine and metoprolol  -Start as needed clonidine, enalapril and labetalol  -Adjust as needed    Left hip pain- (present on admission)  Assessment & Plan  -I think this is unlikely to be metastatic disease considering it has a burning sensation down the anterior aspect of her left leg however if it persists or worsens or causes her difficulty ambulating could do additional imaging but no additional work-up at this time      Uncontrolled type 1 diabetes mellitus (HCC)- (present on admission)  Assessment & Plan  A1c 8.5  Continue basal Lantus and SSI          VTE prophylaxis: enoxaparin ppx    I have performed a physical exam and reviewed and updated ROS and Plan today (9/25/2021). In review of yesterday's note (9/24/2021), there are no changes except as documented above.

## 2021-09-25 NOTE — PROGRESS NOTES
Called film room to see if they are able to create DVD with pt's MRIs so they go with her when she transfers.

## 2021-09-26 VITALS
DIASTOLIC BLOOD PRESSURE: 61 MMHG | SYSTOLIC BLOOD PRESSURE: 128 MMHG | HEIGHT: 66 IN | RESPIRATION RATE: 18 BRPM | OXYGEN SATURATION: 91 % | TEMPERATURE: 97.7 F | WEIGHT: 130.29 LBS | HEART RATE: 68 BPM | BODY MASS INDEX: 20.94 KG/M2

## 2021-09-26 LAB
ALBUMIN SERPL BCP-MCNC: 2.2 G/DL (ref 3.2–4.9)
ALBUMIN/GLOB SERPL: 0.7 G/DL
ALP SERPL-CCNC: 2296 U/L (ref 30–99)
ALT SERPL-CCNC: 184 U/L (ref 2–50)
AMMONIA PLAS-SCNC: 49 UMOL/L (ref 11–45)
ANION GAP SERPL CALC-SCNC: 9 MMOL/L (ref 7–16)
AST SERPL-CCNC: 202 U/L (ref 12–45)
BASOPHILS # BLD AUTO: 1.5 % (ref 0–1.8)
BASOPHILS # BLD: 0.16 K/UL (ref 0–0.12)
BILIRUB SERPL-MCNC: 8.7 MG/DL (ref 0.1–1.5)
BUN SERPL-MCNC: 18 MG/DL (ref 8–22)
CALCIUM SERPL-MCNC: 8.2 MG/DL (ref 8.4–10.2)
CHLORIDE SERPL-SCNC: 104 MMOL/L (ref 96–112)
CMV IGG SERPL IA-ACNC: <0.2 U/ML
CMV IGM SERPL IA-ACNC: <8 AU/ML
CO2 SERPL-SCNC: 18 MMOL/L (ref 20–33)
CREAT SERPL-MCNC: 0.64 MG/DL (ref 0.5–1.4)
EBV EA-D IGG SER-ACNC: 34.5 U/ML (ref 0–10.9)
EBV NA IGG SER IA-ACNC: 31.1 U/ML (ref 0–21.9)
EBV VCA IGG SER IA-ACNC: 492 U/ML (ref 0–21.9)
EBV VCA IGM SER IA-ACNC: 23.2 U/ML (ref 0–43.9)
EOSINOPHIL # BLD AUTO: 0.22 K/UL (ref 0–0.51)
EOSINOPHIL NFR BLD: 2.1 % (ref 0–6.9)
ERYTHROCYTE [DISTWIDTH] IN BLOOD BY AUTOMATED COUNT: 48.1 FL (ref 35.9–50)
GLOBULIN SER CALC-MCNC: 3.3 G/DL (ref 1.9–3.5)
GLUCOSE BLD-MCNC: 131 MG/DL (ref 65–99)
GLUCOSE BLD-MCNC: 194 MG/DL (ref 65–99)
GLUCOSE SERPL-MCNC: 179 MG/DL (ref 65–99)
HCT VFR BLD AUTO: 30.8 % (ref 37–47)
HGB BLD-MCNC: 9.7 G/DL (ref 12–16)
IGG SERPL-MCNC: 859 MG/DL (ref 768–1632)
IGM SERPL-MCNC: 120 MG/DL (ref 35–263)
IMM GRANULOCYTES # BLD AUTO: 0.05 K/UL (ref 0–0.11)
IMM GRANULOCYTES NFR BLD AUTO: 0.5 % (ref 0–0.9)
INR PPP: 0.99 (ref 0.87–1.13)
LYMPHOCYTES # BLD AUTO: 1.44 K/UL (ref 1–4.8)
LYMPHOCYTES NFR BLD: 13.8 % (ref 22–41)
MCH RBC QN AUTO: 31.3 PG (ref 27–33)
MCHC RBC AUTO-ENTMCNC: 31.5 G/DL (ref 33.6–35)
MCV RBC AUTO: 99.4 FL (ref 81.4–97.8)
MITOCHONDRIA M2 IGG SER-ACNC: 3.4 UNITS (ref 0–24.9)
MITOCHONDRIA M2 IGG SER-ACNC: 3.6 UNITS (ref 0–24.9)
MONOCYTES # BLD AUTO: 1.33 K/UL (ref 0–0.85)
MONOCYTES NFR BLD AUTO: 12.8 % (ref 0–13.4)
NEUTROPHILS # BLD AUTO: 7.21 K/UL (ref 2–7.15)
NEUTROPHILS NFR BLD: 69.3 % (ref 44–72)
NRBC # BLD AUTO: 0 K/UL
NRBC BLD-RTO: 0 /100 WBC
NUCLEAR IGG SER QL IA: DETECTED
PLATELET # BLD AUTO: 316 K/UL (ref 164–446)
PMV BLD AUTO: 11.4 FL (ref 9–12.9)
POTASSIUM SERPL-SCNC: 3.5 MMOL/L (ref 3.6–5.5)
PROT SERPL-MCNC: 5.5 G/DL (ref 6–8.2)
PROTHROMBIN TIME: 12.3 SEC (ref 12–14.6)
RBC # BLD AUTO: 3.1 M/UL (ref 4.2–5.4)
SARS-COV-2 RNA RESP QL NAA+PROBE: NOTDETECTED
SMA IGG SER-ACNC: 9 UNITS (ref 0–19)
SODIUM SERPL-SCNC: 131 MMOL/L (ref 135–145)
SPECIMEN SOURCE: NORMAL
WBC # BLD AUTO: 10.4 K/UL (ref 4.8–10.8)

## 2021-09-26 PROCEDURE — A9270 NON-COVERED ITEM OR SERVICE: HCPCS | Performed by: INTERNAL MEDICINE

## 2021-09-26 PROCEDURE — 700111 HCHG RX REV CODE 636 W/ 250 OVERRIDE (IP): Performed by: INTERNAL MEDICINE

## 2021-09-26 PROCEDURE — A9270 NON-COVERED ITEM OR SERVICE: HCPCS | Performed by: NURSE PRACTITIONER

## 2021-09-26 PROCEDURE — 700102 HCHG RX REV CODE 250 W/ 637 OVERRIDE(OP): Performed by: INTERNAL MEDICINE

## 2021-09-26 PROCEDURE — 82140 ASSAY OF AMMONIA: CPT

## 2021-09-26 PROCEDURE — 85610 PROTHROMBIN TIME: CPT

## 2021-09-26 PROCEDURE — 99239 HOSP IP/OBS DSCHRG MGMT >30: CPT | Performed by: INTERNAL MEDICINE

## 2021-09-26 PROCEDURE — 80053 COMPREHEN METABOLIC PANEL: CPT

## 2021-09-26 PROCEDURE — 82962 GLUCOSE BLOOD TEST: CPT

## 2021-09-26 PROCEDURE — 36415 COLL VENOUS BLD VENIPUNCTURE: CPT

## 2021-09-26 PROCEDURE — 85025 COMPLETE CBC W/AUTO DIFF WBC: CPT

## 2021-09-26 PROCEDURE — 700102 HCHG RX REV CODE 250 W/ 637 OVERRIDE(OP): Performed by: NURSE PRACTITIONER

## 2021-09-26 RX ORDER — POTASSIUM CHLORIDE 20 MEQ/1
40 TABLET, EXTENDED RELEASE ORAL ONCE
Status: COMPLETED | OUTPATIENT
Start: 2021-09-26 | End: 2021-09-26

## 2021-09-26 RX ADMIN — METOPROLOL SUCCINATE 200 MG: 25 TABLET, EXTENDED RELEASE ORAL at 06:54

## 2021-09-26 RX ADMIN — ENOXAPARIN SODIUM 40 MG: 40 INJECTION SUBCUTANEOUS at 06:55

## 2021-09-26 RX ADMIN — CALCIUM CARBONATE (ANTACID) CHEW TAB 500 MG 500 MG: 500 CHEW TAB at 03:29

## 2021-09-26 RX ADMIN — LEVOTHYROXINE SODIUM 25 MCG: 0.05 TABLET ORAL at 06:55

## 2021-09-26 RX ADMIN — POTASSIUM CHLORIDE 40 MEQ: 1500 TABLET, EXTENDED RELEASE ORAL at 09:39

## 2021-09-26 RX ADMIN — NICOTINE TRANSDERMAL SYSTEM 21 MG: 21 PATCH, EXTENDED RELEASE TRANSDERMAL at 06:57

## 2021-09-26 RX ADMIN — LEVOTHYROXINE SODIUM 200 MCG: 0.05 TABLET ORAL at 06:56

## 2021-09-26 RX ADMIN — INSULIN HUMAN 2 UNITS: 100 INJECTION, SOLUTION PARENTERAL at 11:34

## 2021-09-26 RX ADMIN — ASPIRIN 81 MG: 81 TABLET, COATED ORAL at 06:55

## 2021-09-26 RX ADMIN — AMLODIPINE BESYLATE 5 MG: 5 TABLET ORAL at 06:55

## 2021-09-26 ASSESSMENT — ENCOUNTER SYMPTOMS
BLOOD IN STOOL: 0
SHORTNESS OF BREATH: 0
ABDOMINAL PAIN: 0
VOMITING: 0
COUGH: 0

## 2021-09-26 ASSESSMENT — PAIN DESCRIPTION - PAIN TYPE: TYPE: ACUTE PAIN

## 2021-09-26 NOTE — DISCHARGE SUMMARY
"Discharge Summary    CHIEF COMPLAINT ON ADMISSION  Chief Complaint   Patient presents with   • Jaundice     Reports \"my enzymes were high and I looked yellow so my GP sent me here. I don't even drink. I mean occasionally, but...\".    • Abdominal Pain     Reports low abd pain \"just irritating\". Reports as well nausea and diarrhea. Denies vomiting or bloody stools.        Reason for Admission  Evaded liver enzymes    CODE STATUS  Full Code    HPI & HOSPITAL COURSE    This is a 66-year-old female with a past medical history of hypertension, type 1 diabetes mellitus, surgical hypothyroidism admitted 9/22/2021 for worsening jaundice and lower abdominal pain.  She reports nausea, diarrhea, and mild intermittent cramping, generalized abdominal pain has been waxing and waning for approximately 3-4 weeks.  She saw her primary care provider who referred her to Dr. Zavala from gastroenterology whom she has seen in the past.  She had initial lab work as well as a CT abdomen which showed colitis and has appointment of follow-up Dr. Zavala in 11/2021. Colonoscopy in recent years showed ulceration of colon. She followed up with her primary care provider on 9/22/2021 who noticed patient's jaundice worsening and was referred to the emergency department for further evaluation.  Lab work is significant for , , alk phos of 8/25/1941, total bilirubin 7.7.  US-RUQ 9/22/2021 showed likely hepatic steatosis and cholelithiasis but no evidence of acute cholecystitis. CT abdomen pelvis showed wall thickening throughout the entire colon anomaly in the cecum, descending, and sigmoid colon-similar to patient's prior CT scan.  MRI abdomen and MRCP did not reveal any evidence of mass, obstruction, biliary duct abnormality.  Doppler revealed arterial blood flow normal throughout the liver.  LFTs, total bilirubin remain elevated, hepatitis panel negative, GGT elevated consistent with liver etiology.  Ammonia levels uptrending " without neurological symptoms.  Hepatitis E antibodies, EBV titers, CMV antibodies, HSV antibodies, varicella-zoster antibodies, ceruloplasmin, SCARLETT still in process and pending results.  Dr. Zavala from GI has been consulted/following and recommended patient transfer to [facility] for hepatology, spoke with Dr. Luis Manuel Duenas who is the hospitalist that graciously accepted at [facility]. Patient is agreeable for transfer.     Therefore, she is discharged in fair and stable condition to [facility] for higher level of care with hepatology.    The patient met 2-midnight criteria for an inpatient stay at the time of discharge.      FOLLOW UP ITEMS POST DISCHARGE  Transfer to [facility] for hepatology    DISCHARGE DIAGNOSES  Principal Problem:    Elevated liver enzymes POA: Yes  Active Problems:    Uncontrolled type 1 diabetes mellitus (HCC) POA: Yes      Overview: ICD-10 transition    Left hip pain POA: Yes    Hypertension POA: Yes    Hypothyroidism in adult POA: Yes      Overview:                 Macrocytic anemia POA: Yes    Hyponatremia POA: Yes    Elevated troponin POA: Yes  Resolved Problems:    * No resolved hospital problems. *      FOLLOW UP  Future Appointments   Date Time Provider Department Center   9/29/2021  8:00 AM [facility] 1 [facility]   9/29/2021  9:30 AM [facility] 1 [facility]     No follow-up provider specified.    MEDICATIONS ON DISCHARGE     Medication List      CONTINUE taking these medications      Instructions   amLODIPine 10 MG Tabs  Commonly known as: NORVASC   Take 5 mg by mouth. N THE EVENING  Dose: 5 mg     aspirin 81 MG EC tablet   Take 1 Tab by mouth every morning.  Dose: 81 mg     FreeStyle John 14 Day Sensor Misc   1 MISCELLANEOUS E10.42 CHANGE SENSOR EVERY 14 DAYS   E11.65     metoprolol 200 MG XL tablet  Commonly known as: TOPROL-XL   Take 1 tablet by mouth every day.  Dose: 200 mg     NovoLOG 100 UNIT/ML Soln  Generic drug: insulin aspart   Inject 1-5 Units under the  skin 3 times a day before meals. 1 units for every 5 g of carbs   AND  Sliding Scale   150 - 200 = 1 units  201 - 250 = 2 units  251 - 300 = 3 units  301 - 350 = 4 units  351 - 400 = 5 units  Dose: 1-5 Units     sertraline 100 MG Tabs  Commonly known as: Zoloft   Take 100 mg by mouth every day.  Dose: 100 mg     * Synthroid 200 MCG Tabs  Generic drug: levothyroxine   Take 200 mcg by mouth every day.  Dose: 200 mcg     * Synthroid 25 MCG Tabs  Generic drug: levothyroxine          * This list has 2 medication(s) that are the same as other medications prescribed for you. Read the directions carefully, and ask your doctor or other care provider to review them with you.            STOP taking these medications    Acetaminophen Extra Strength 500 MG Tabs  Generic drug: acetaminophen     atorvastatin 40 MG Tabs  Commonly known as: LIPITOR     lidocaine 5 % Ptch  Commonly known as: LIDODERM     traZODone 100 MG Tabs  Commonly known as: DESYREL     VITAMIN D3 PO            Allergies  Allergies   Allergen Reactions   • Ativan Unspecified      Extreme Restlessness with whole body  GTI=1251   • Tape      Blisters, paper tape is ok       DIET  Orders Placed This Encounter   Procedures   • Diet Order Diet: Consistent CHO (Diabetic)     Standing Status:   Standing     Number of Occurrences:   1     Order Specific Question:   Diet:     Answer:   Consistent CHO (Diabetic) [4]       ACTIVITY  As tolerated.  Weight bearing as tolerated    LINES, DRAINS, AND WOUNDS  This is an automated list. Peripheral IVs will be removed prior to discharge.                     MENTAL STATUS ON TRANSFER  Awake, alert, oriented x4.   Pleasant demeanor.  Understanding and agreeable to transfer.    CONSULTATIONS  Gastroenterology    PROCEDURES  None    LABORATORY       9/23/2021 05:00 9/24/2021 10:31 9/25/2021 04:42 9/26/2021 01:25   WBC 9.3 9.7 10.4 10.4   RBC 3.39 (L) 3.47 (L) 3.42 (L) 3.10 (L)   Hemoglobin 10.6 (L) 11.0 (L) 10.7 (L) 9.7 (L)    Hematocrit 33.0 (L) 34.6 (L) 33.5 (L) 30.8 (L)   MCV 97.3 99.7 (H) 98.0 (H) 99.4 (H)   MCH 31.3 31.7 31.3 31.3   MCHC 32.1 (L) 31.8 (L) 31.9 (L) 31.5 (L)   RDW 50.4 (H) 48.5 47.4 48.1   Platelet Count 286 317 315 316   MPV 11.4 11.5 11.7 11.4   Neutrophils-Polys  72.20 (H) 70.50 69.30   Neutrophils (Absolute)  6.96 7.34 (H) 7.21 (H)   Lymphocytes  12.70 (L) 12.40 (L) 13.80 (L)   Lymphs (Absolute)  1.23 1.29 1.44   Monocytes  10.60 12.70 12.80   Monos (Absolute)  1.02 (H) 1.32 (H) 1.33 (H)   Eosinophils  3.00 2.60 2.10   Eos (Absolute)  0.29 0.27 0.22   Basophils  1.20 1.20 1.50   Baso (Absolute)  0.12 0.12 0.16 (H)   Immature Granulocytes  0.30 0.60 0.50   Immature Granulocytes (abs)  0.03 0.06 0.05   Nucleated RBC  0.00 0.00 0.00   NRBC (Absolute)  0.00 0.00 0.00        9/23/2021 14:03 9/24/2021 10:31 9/25/2021 04:42 9/26/2021 01:25   Sodium 131 (L) 132 (L) 131 (L) 131 (L)   Potassium 4.6 4.1 3.6 3.5 (L)   Chloride 99 98 100 104   Co2 21 22 20 18 (L)   Anion Gap  12.0 11.0 9.0   Glucose 295 (H) 182 (H) 153 (H) 179 (H)   Bun 16 20 19 18   Creatinine 0.67 0.66 0.69 0.64   GFR If  >60 >60 >60 >60   GFR If Non  >60 >60 >60 >60   Calcium 8.0 (L) 8.5 8.4 8.2 (L)   AST(SGOT)  257 (H) 226 (H) 202 (H)   ALT(SGPT)  233 (H) 198 (H) 184 (H)   Alkaline Phosphatase  2594 (H) 2424 (H) 2296 (H)   Total Bilirubin  8.4 (H) 8.6 (H) 8.7 (H)   Albumin 2.5 (L) 3.1 (L) 2.9 (L) 2.2 (L)   Total Protein  6.8 6.1 5.5 (L)   Globulin  3.7 (H) 3.2 3.3   A-G Ratio  0.8 0.9 0.7        9/22/2021 20:13 9/26/2021 01:25   PT 12.5 12.3   INR 1.01 0.99   APTT 27.3         9/22/2021 20:40   Color Elida   Character Hazy (A)   Specific Gravity >=1.030   Ph 5.5   Glucose 500 (A)   Ketones Trace (A)   Bilirubin Large (A)   Occult Blood Trace (A)   Protein 100 (A)   Nitrite Negative   Leukocyte Esterase Negative   Micro Urine Req Microscopic   WBC 2-5   RBC 2-5 (A)   Epithelial Cells Few   Bacteria Few (A)   Mucous Threads  Few   Ca Oxalate Crystal Few   Hyaline Cast 0-2        9/22/2021 20:13   Venous Bg Ph 7.39   Venous Bg Pco2 38.2 (L)   Venous Bg Po2 23.1 (L)   Venous Bg Hco3 23 (L)   Venous Bg Base Excess -2   Venous Bg O2 Saturation 40.7          9/22/2021 20:13 9/24/2021 10:31 9/24/2021 10:40   Alpha Fetoprotein  6    Immunoglobulin G   859   Immunoglobulin M  120    Procalcitonin 0.63 (H)     Hepatitis A Virus Ab, IgM Non-Reactive     Hep B Surface Antibody Quant  <3.50    Hepatitis B Surface Antigen Non-Reactive     Hepatitis B Cors Ab,IgM Non-Reactive     Hepatitis B Core Ab, Total  NonReactive    Hepatitis C Antibody Non-Reactive     CMV by PCR, Source   Plasma   HSV Source   Serum   Stat C-Reactive Protein 2.85 (H)     Anti-Mitochondrial Ab  3.4 3.6   F-Actin Ab, IgG   9       Total time of the discharge process exceeds 31 minutes.

## 2021-09-26 NOTE — PROGRESS NOTES
COVID 19 surge in effect     1905: Received report from Day shift RN. Pt is laying in bed, A+OX4 , showing no signs of distress. Pt denies the need for pain medication. VSS. Pt educated on POC. Call light and belongings at bedside and within reach. All questions answered at this time. Rounding in place

## 2021-09-26 NOTE — DISCHARGE PLANNING
Anticipated Discharge Disposition: [facility], TBA    Action: LSW notified in morning rounds that pt will transfer this morning.    1013: LSW received message from LINO Cronin, requesting PCS, TBA, and cobra.    LSW received call from Roseanne. Per Roseanne, she will fax over the TBA and checklist.     1040: MINI Oquendo to fax PCS form to transfer center.    1146: LSW printed all paperwork on checklist including transfer summary, MAR, d/c summary, H&P, and current Vital signs.     1209: LSW faxed transfer report and TBA to transfer center and [facility] [place] ([phone]).    1215: Per BOBBY Welch, she called film room at [phone] and they pushed the diagnostic studies through. Per Roseanne, they pushed the studies through.    1235: No confirmation that fax went through. LSW received call from Roseanne. Roseanne requested a d/c summary and TBA be re-faxed. LSW re-faxed a d/c summary and the TBA form to both transfer centers.    1250: LSW notified by Roseanne that [facility] cost to pt will be $3000 as nother critical was noted on PCS. LSW asked if this LSW can fill out new PCS. Per Roseanne, a new PCS form can be filled out.    1257: LSW faxed new PCS form to transfer center. Per MINI WING, CD is being printed.    Barriers to Discharge: CD, [facility] approval, TBA confirmation    Plan: LSW to continue to follow and assist.    1340: LSW placed completed transfer packet in pt's chart. LSW placed imaging CD in transfer packet. LSW notified MINI Valentin that transfer packet is in chart.    1421: LSW received message from MINI Grant. Per RN, pt's dtr called [facility] and states that [facility] will not be able to take pt for 1-3 days. Per RN, pt's dtr would like to drive pt to [place] to [facility].    LSW messaged Roseanne RTJAYNE, per Roseanne, [facility] does not have a crew right now to take pt to [place] and flying is not an option. Per MD Roseanne would need to approve dtr driving the pt.    1430: LSW messaged leader on call Sixto to get feedback.  Per Sixto, dtr can drive pt if the MD okays it. Per Sixto, just need to let RTOC know who is driving and provide number.    LSW messaged Dr. Landis to confirm she is okay with pt's dtr driving. Per Dr. Landis, she is good with dtr driving the pt to [place].     1447: LSW notified MINI Grant that MD cleared dtr to drive and clarified that Allyson Angel will be driving pt to [place].    1449: LSW notified Roseanne RTOC that pt's dtr, Farzaneh Angel ([phone]) will be driving pt to [facility].    1526: Per Roseanne, she called and notified [facility] and [facility] that pts dtr will be driving pt to [place].

## 2021-09-26 NOTE — PROGRESS NOTES
Discussed patient with Dr. Luis Manuel Duenas, who is the accepting Hospitalist at [facility].

## 2021-09-26 NOTE — PROGRESS NOTES
Gastroenterology Progress Note     Author: Gertrude Miranda APRN     Date & Time Created: 2021 10:37 AM    Patient ID:  Name:             Anu Manuel   YOB: 1955  Age:                 66 y.o.  female   MRN:               [id number]    Chief Complaint:     Jaundice      Current Illness:  Anu Manuel was admitted through the ER to evaluate jaundice for about 3 days.  She noticed dark urine during the last 2-3-3 weeks.  She also has early satiety and recent nausea.  She lost 23 pounds since 3/2021.  She denied supplements and mushroom ingestion.  .  Alcohol: 1 glass of wine per month.  No prior hepatitis.  No family history of liver disease.     Selected Labs  2020: LT: 117-28-39-0.3  2020: LT: 501--0.4, lipase 7  2021: LT: 141-37-45-0.4,  , GFR 44  3/2021: -27-37-0.3,   with high liver fraction 104 (0-94).  2021: LT: 90-30-33-0.3  2021: LT: 8758-428-061-2.3-1.6  2021: LT: 7654-477-676-7.7, albumin 3.4, INR 1.0, PTT 27  21: LT: 9184-226-117-8.7     Liver evaluation in 2021  Negative tests:  .  Lipase  .  Acute hepatitis panel  .  Total HBcAb  .  HBsAb  .  Iron saturation, ferritin  .AMA: 3.6  .F-Actin: 9  .Ig  .  MRCP  Positive tests:  .  Ammonia 26 on , elevated at 48 on .    Pending tests:  Hepatitis E antibodies  EBV titers  CMV antibodies  Herpes simplex antibodies  Varicella-zoster antibodies  Ceruloplasmin  SCARLETT           Imaging  APCT 2021: Mildly thick wall of cecum, descending and sigmoid colon.  Possible Crohn's disease.     APCT 2021:  Contracted gallbladder.  Normal liver.  Diffusely thick colon wall, mostly in the cecum, descending and sigmoid colon, similar to the prior CT.     US abdomen 2021: Gallstones.  Contracted gallbladder.  CBD diameter 4 mm.  Echogenic liver.    US Doppler liver vessels 2021: Patent hepatic vein, portal vein and splenic vein.  Gallstones.    MRI pancreas: No  liver or pancreatic mass.  No biliary dilation.  Normal patent hepatic veins.    MRCP: Hepatomegaly.  Normal MRCP.      Interval History:  9/24/2021  .  Her symptoms are the same.  No new symptoms.  .  Discussed the MRI pancreas with Dr. Jean.  The MRI was ordered as MRI of the pancreas, and not an MRCP.    9/25/2021  .  Stable.  No new symptoms.  Awaiting additional labs.  Awaiting transfer to [facility].    9/26/21: Stable. No changes in symptoms. So far labs have been unremarkable. LFT's continue to trend up. Being transferred to [facility] today.      Assessment:  Transfer to [facility] hepatology  .  Discussed her condition with [facility] hepatologist Dr. Saleem Herrera, who think she should be transferred to [facility] for additional evaluation and treatment.  I agree.  I discussed this option with her, and with her daughter, who listened in by phone, and they both agree.    Elevated liver tests  .  Mixed pattern, both cholestasis and hepatocellular.  The cause is unclear.  ---Await the pending lab tests.    Elevated ammonia level  .  Ammonia 26 on 9/22, elevated at 48 on 9/24.  ---Follow the ammonia level.     Mildly thick colon wall on CT  ---Evaluate with colonoscopy at the optimal timing.     Lower abdominal pain  . In 6/2021 she developed almost constant mild lower abdominal pain.  . Rule out an organic cause.     Colon cancer screening  . COL 5/2018:  Poor preparation.  Segmental sigmoid colitis with benign ulceration.  .  There is a history of colon polyps, but the validity is unclear.  ---Because of the poor preparation, consider a colonoscopy at the optimal timing. Discuss this issue with her later.    Recommendations:  Transfer to [facility] hepatology when it can be arranged.  ---Send a DVD (containing the MRI from 9/24 and MRCP from 9/26 images) to [facility] with her.      Labs:  Recent Labs     09/24/21  1031 09/25/21  0442 09/26/21  0125   WBC 9.7 10.4 10.4   RBC 3.47* 3.42* 3.10*   HEMOGLOBIN 11.0* 10.7* 9.7*   HEMATOCRIT 34.6* 33.5*  30.8*   MCV 99.7* 98.0* 99.4*   MCH 31.7 31.3 31.3   MCHC 31.8* 31.9* 31.5*   RDW 48.5 47.4 48.1   PLATELETCT 317 315 316   MPV 11.5 11.7 11.4      Recent Labs     09/26/21  0125   INR 0.99   review  Recent Labs     09/24/21  1031 09/25/21  0442 09/26/21  0125   SODIUM 132* 131* 131*   POTASSIUM 4.1 3.6 3.5*   CHLORIDE 98 100 104   CO2 22 20 18*   GLUCOSE 182* 153* 179*   BUN 20 19 18   CREATININE 0.66 0.69 0.64   CALCIUM 8.5 8.4 8.2*     Recent Labs     09/24/21  1031 09/25/21  0442 09/26/21  0125   ALTSGPT 233* 198* 184*   ASTSGOT 257* 226* 202*   ALKPHOSPHAT 2594* 2424* 2296*   TBILIRUBIN 8.4* 8.6* 8.7*   GAMMAGT 1191*  --   --    LIPASE 19  --   --    GLUCOSE 182* 153* 179*       No results found for: BLOODCULTU, BLDCULT, BCHOLD     GI/Nutrition:  Orders Placed This Encounter   Procedures   • Diet Order Diet: Consistent CHO (Diabetic)     Standing Status:   Standing     Number of Occurrences:   1     Order Specific Question:   Diet:     Answer:   Consistent CHO (Diabetic) [4]       Hospital Medications:  IV infusions:  None  amLODIPine, 5 mg, Oral, Q DAY  aspirin, 81 mg, Oral, QAM  metoprolol SR, 200 mg, Oral, DAILY  levothyroxine, 200 mcg, Oral, QDAY  levothyroxine, 25 mcg, Oral, AM ES  senna-docusate, 2 Tablet, Oral, BID  enoxaparin, 40 mg, Subcutaneous, DAILY  insulin regular, 2-9 Units, Subcutaneous, 4X/DAY ACHS  insulin glargine, 15 Units, Subcutaneous, Q EVENING  nicotine, 21 mg, Transdermal, Daily-0600  sertraline, 100 mg, Oral, QHS      PRN medications: LORazepam, calcium carbonate, senna-docusate **AND** polyethylene glycol/lytes **AND** magnesium hydroxide **AND** bisacodyl, cloNIDine, enalaprilat, labetalol, ondansetron, ondansetron, promethazine, promethazine, prochlorperazine, insulin regular **AND** POC blood glucose manual result **AND** NOTIFY MD and PharmD **AND** glucose **AND** dextrose 50%, nicotine **AND** Nicotine Replacement Patient Education Materials **AND** nicotine  "polacrilex    Fluids:    Intake/Output Summary (Last 24 hours) at 9/24/2021 1523  Last data filed at 9/24/2021 1300  Gross per 24 hour   Intake 980 ml   Output --   Net 980 ml          Past Medical History:   Past Medical History:   Diagnosis Date   • Adverse effect of anesthesia     in 2008 \"throat closes up\"\"anxiety\" ?laryngospasm, kept in ICU. Pt states no problems currently 2018.    • Anesthesia     in 2008 \"throat closes up\"\"anxiety\" ?laryngospasm, kept in ICU. Pt states no problems currently 2018.    • Arthritis     right shoulder, hands   • Cigarette smoker quit 2013   • Dental disorder     missing teeth    • depression 8/30/2016    denies depression, states has anxiety and panic attacks   • Diabetes mellitus type 1 (HCC) 1989    insulin   • Encounter for long-term (current) use of insulin ([facility]) 9/25/2013   • Heart burn    • High cholesterol    • Hypertension    • Hypothyroidism, postsurgical 1970   • Indigestion    • Infectious disease      had hepatitis C, tested neg.   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.63) 6/10/2015       Past Surgical History:  Past Surgical History:   Procedure Laterality Date   • CATH PLACEMENT  1/25/2020    Procedure: INSERTION, CATHETER PERM;  Surgeon: Rola Mendoza M.D.;  Location: [facility];  Service: General   • IRRIGATION & DEBRIDEMENT NEURO  1/19/2020    Procedure: IRRIGATION AND DEBRIDEMENT, WOUND THORACIC AND LUMBAR;  Surgeon: Ryan Roman M.D.;  Location: [facility];  Service: Neurosurgery   • PB IMPLANT NEUROSTIM/ N/A 12/16/2019    Procedure: EXPLORATION AT THORACIC 8 - 9, REPLACEMENT OF  NEUROSTIMULATOR LEAD;  Surgeon: Ryan Roman M.D.;  Location: [facility];  Service: Neurosurgery   • " "SPINAL CORD STIMULATOR N/A 10/26/2018    Procedure: SPINAL CORD STIMULATOR;  Surgeon: Ryan Roman M.D.;  Location: SUR[facility];  Service: Neurosurgery   • THORACIC LAMINECTOMY N/A 10/26/2018    Procedure: THORACIC LAMINECTOMY - FOR;  Surgeon: Ryan Roman M.D.;  Location: [facility];  Service: Neurosurgery   • [place] NJX AA&/STRD TFRML EPI LUMBAR/SACRAL 1 LEVEL Right 8/31/2016    Procedure: INJ-FORAMEN EPI LUM/SAC SNGL L4-5;  Surgeon: Sukhi Godfrey M.D.;  Location: SUR[facility]ERY [facility];  Service: Pain Management   • LUMBAR LAMINECTOMY DISKECTOMY Right 5/10/2016    Procedure: LUMBAR L4-5 HEMILAMINECTOMY DISKECTOMY ;  Surgeon: Arnold Keyes M.D.;  Location: [facility];  Service:    • CERVICAL FUSION POSTERIOR  1/16/2009    Performed by TARA CONTRERAS at [facility]   • HARDWARE REMOVAL NEURO  1/16/2009    Performed by TARA CONTRERAS at [facility]   • NECK EXPLORATION  1/16/2009    Performed by TARA CONTRERAS at [facility]   • SHOULDER ARTHROSCOPY W/ ROTATOR CUFF REPAIR  10/9/08    Performed by SHERLY CASTANEDA at [facility]   • SHOULDER DECOMPRESSION ARTHROSCOPIC  6/17/08    Performed by SHERLY CASTANEDA at [facility]   • CLAVICLE DISTAL EXCISION  6/17/08    Performed by SHERLY CASTANEDA at [facility]   • CERVICAL DISK AND FUSION ANTERIOR  03/12/08   • HYSTERECTOMY, VAGINAL  2006   • APPENDECTOMY  2004   • THYROID LOBECTOMY  1973   • TONSILLECTOMY  1963   • ABDOMINAL HYSTERECTOMY TOTAL     • LUMPECTOMY  1976, 2005    Breast    • LUMPECTOMY         Physical Exam:  Vitals/ General Appearance:   Weight/BMI: Body mass index is 21.03 kg/m².  /65   Pulse 66   Temp 36.7 °C (98.1 °F) (Oral)   Resp 18   Ht 1.676 m (5' 6\")   Wt 59.1 kg (130 lb 4.7 oz)   SpO2 97%   Vitals:    09/25/21 1010 09/25/21 1617 09/25/21 2100 09/26/21 0500   BP: 118/67 151/63 139/66 125/65   Pulse: 65 71 73 66   Resp: " 17 16 18 18   Temp: 36.7 °C (98 °F) 36.7 °C (98 °F) 36.8 °C (98.2 °F) 36.7 °C (98.1 °F)   TempSrc: Oral Oral Oral Oral   SpO2: 96% 93% 95% 97%   Weight:       Height:         Oxygen Therapy:  Pulse Oximetry: 97 %, O2 (LPM): 0, O2 Delivery Device: None - Room Air    Physical Exam  Vitals reviewed.   Constitutional:       General: She is not in acute distress.  HENT:      Head: Normocephalic and atraumatic.   Eyes:      General: Scleral icterus present.   Cardiovascular:      Rate and Rhythm: Regular rhythm.      Heart sounds: Normal heart sounds. No murmur heard.   No gallop.    Pulmonary:      Comments: Normal breath sounds anteriorly.  Abdominal:      Palpations: Abdomen is soft. There is no mass.      Tenderness: There is no abdominal tenderness.   Skin:     Coloration: Skin is jaundiced.   Neurological:      Comments: No asterixis.   Psychiatric:         Judgment: Judgment normal.         Review of Systems:  Review of Systems   Respiratory: Negative for cough and shortness of breath.    Cardiovascular: Negative for chest pain.   Gastrointestinal: Negative for abdominal pain, blood in stool, melena and vomiting.       Medical Decision Making, by Problem:  Active Hospital Problems    Diagnosis    • *Elevated liver enzymes [R74.8]    • Macrocytic anemia [D53.9]    • Hyponatremia [E87.1]    • Elevated troponin [R77.8]    • Hypothyroidism in adult [E03.9]    • Hypertension [I10]    • Left hip pain [M25.552]    • Uncontrolled type 1 diabetes mellitus (HCC) [E10.65]      GI WILL SIGN OFF PLEASE CALL IF ANY QUESTIONS OR CONCERS    WILLIAM Galarza.

## 2021-09-26 NOTE — DISCHARGE INSTRUCTIONS
Discharge Instructions    Discharged to home by car with relative. Discharged via wheelchair, hospital escort: Yes.  Special equipment needed: Not Applicable    Be sure to schedule a follow-up appointment with your primary care doctor or any specialists as instructed.     Discharge Plan:   Diet Plan: Discussed  Activity Level: Discussed  Confirmed Follow up Appointment: Appointment Scheduled  Confirmed Symptoms Management: Discussed  Medication Reconciliation Updated: Yes    I understand that a diet low in cholesterol, fat, and sodium is recommended for good health. Unless I have been given specific instructions below for another diet, I accept this instruction as my diet prescription.   Other diet: Home diet as tolerated    Special Instructions: None    · Is patient discharged on Warfarin / Coumadin?   No     Depression / Suicide Risk    As you are discharged from this [facility] facility, it is important to learn how to keep safe from harming yourself.    Recognize the warning signs:  · Abrupt changes in personality, positive or negative- including increase in energy   · Giving away possessions  · Change in eating patterns- significant weight changes-  positive or negative  · Change in sleeping patterns- unable to sleep or sleeping all the time   · Unwillingness or inability to communicate  · Depression  · Unusual sadness, discouragement and loneliness  · Talk of wanting to die  · Neglect of personal appearance   · Rebelliousness- reckless behavior  · Withdrawal from people/activities they love  · Confusion- inability to concentrate     If you or a loved one observes any of these behaviors or has concerns about self-harm, here's what you can do:  · Talk about it- your feelings and reasons for harming yourself  · Remove any means that you might use to hurt yourself (examples: pills, rope, extension cords, firearm)  · Get professional help from the community (Mental Health, Substance Abuse, psychological  counseling)  · Do not be alone:Call your Safe Contact- someone whom you trust who will be there for you.  · Call your local CRISIS HOTLINE 071-1310 or 300-040-8948  · Call your local Children's Mobile Crisis Response Team [place] (873) 306-7078 or www.[employer]  · Call the toll free National Suicide Prevention Hotlines   · National Suicide Prevention Lifeline 484-718-IQPM (8539)  · National Hope Line Network 800-SUICIDE (269-8467)

## 2021-09-26 NOTE — DISCHARGE PLANNING
Anticipated Discharge Disposition: Transfer in progress     Action: [facility] in [employer] provided info on transfer and receiving  MD to push films over at 12:11. NEELAM advised film on disc in chart    Barriers to Discharge: Unknown    Plan: No further needs.

## 2021-09-26 NOTE — PROGRESS NOTES
Patient pending transfer to [facility]. Transport was arranged via [facility] to [facility]. Patient states she called [facility] to ask about an estimated transport time and was told crew would be able to transport in the next 1-3 days.  Patient is eager to be able to go on a planned vacation and states her daughter is willing to drive her to [facility] tonight in order to expedite care. Patient is stable for discharge with daughter to transport to [facility]. LSW notified and discussed wiuth [facility], and transfer facility.    Discussed with Barbie MORSE, Kelly Jorge RN, Dr. Landis, and      DANNY Gutierrez, ACNPC-AG

## 2021-09-26 NOTE — PROGRESS NOTES
Pt discharged with MD order. Discharge instructions, prescriptions, and follow-up appointments reviewed, all questions answered. Importance of reporting to [facility] for biopsy stressed to patient who verbalized understanding.

## 2021-09-27 ENCOUNTER — HOSPITAL ENCOUNTER (INPATIENT)
Facility: MEDICAL CENTER | Age: 66
LOS: 2 days | DRG: 444 | End: 2021-09-30
Attending: EMERGENCY MEDICINE | Admitting: FAMILY MEDICINE
Payer: MEDICARE

## 2021-09-27 DIAGNOSIS — R74.8 ELEVATED LIVER ENZYMES: ICD-10-CM

## 2021-09-27 DIAGNOSIS — R07.9 CHEST PAIN, UNSPECIFIED TYPE: ICD-10-CM

## 2021-09-27 DIAGNOSIS — E03.9 HYPOTHYROIDISM IN ADULT: ICD-10-CM

## 2021-09-27 DIAGNOSIS — I25.10 CAD S/P PERCUTANEOUS CORONARY ANGIOPLASTY: ICD-10-CM

## 2021-09-27 DIAGNOSIS — R04.2 HEMOPTYSIS: ICD-10-CM

## 2021-09-27 DIAGNOSIS — K72.00 ACUTE LIVER FAILURE WITHOUT HEPATIC COMA: ICD-10-CM

## 2021-09-27 DIAGNOSIS — R73.9 HYPERGLYCEMIA: ICD-10-CM

## 2021-09-27 DIAGNOSIS — D53.9 MACROCYTIC ANEMIA: ICD-10-CM

## 2021-09-27 DIAGNOSIS — E87.1 HYPONATREMIA: ICD-10-CM

## 2021-09-27 DIAGNOSIS — R91.1 LUNG NODULE: ICD-10-CM

## 2021-09-27 DIAGNOSIS — R10.11 RIGHT UPPER QUADRANT ABDOMINAL PAIN: ICD-10-CM

## 2021-09-27 DIAGNOSIS — R17 JAUNDICE: ICD-10-CM

## 2021-09-27 DIAGNOSIS — Z98.61 CAD S/P PERCUTANEOUS CORONARY ANGIOPLASTY: ICD-10-CM

## 2021-09-27 DIAGNOSIS — R79.89 ELEVATED TROPONIN: ICD-10-CM

## 2021-09-27 DIAGNOSIS — R10.13 EPIGASTRIC PAIN: ICD-10-CM

## 2021-09-27 LAB
BACTERIA BLD CULT: NORMAL
BACTERIA BLD CULT: NORMAL
CERULOPLASMIN SERPL-MCNC: 32 MG/DL (ref 17–54)
CMV DNA # SERPL NAA+PROBE: <390 CPY/ML
CMV DNA SERPL NAA+PROBE-ACNC: <227 IU/ML
CMV DNA SERPL NAA+PROBE-LOG IU: <2.4 LOG IU/ML
CMV DNA SERPL NAA+PROBE-LOG#: <2.6 LOG CPY/ML
CMV GENE MUT DET ISLT: NOT DETECTED
CMV PCR SOURCE Q4398: NORMAL
SIGNIFICANT IND 70042: NORMAL
SIGNIFICANT IND 70042: NORMAL
SITE SITE: NORMAL
SITE SITE: NORMAL
SOURCE SOURCE: NORMAL
SOURCE SOURCE: NORMAL

## 2021-09-27 PROCEDURE — 99285 EMERGENCY DEPT VISIT HI MDM: CPT

## 2021-09-28 ENCOUNTER — APPOINTMENT (OUTPATIENT)
Dept: RADIOLOGY | Facility: MEDICAL CENTER | Age: 66
DRG: 444 | End: 2021-09-28
Attending: FAMILY MEDICINE
Payer: MEDICARE

## 2021-09-28 ENCOUNTER — APPOINTMENT (OUTPATIENT)
Dept: RADIOLOGY | Facility: MEDICAL CENTER | Age: 66
DRG: 444 | End: 2021-09-28
Attending: EMERGENCY MEDICINE
Payer: MEDICARE

## 2021-09-28 PROBLEM — R10.13 EPIGASTRIC PAIN: Status: ACTIVE | Noted: 2021-09-28

## 2021-09-28 LAB
ALBUMIN SERPL BCP-MCNC: 2.9 G/DL (ref 3.2–4.9)
ALBUMIN/GLOB SERPL: 0.9 G/DL
ALP SERPL-CCNC: 2183 U/L (ref 30–99)
ALT SERPL-CCNC: 172 U/L (ref 2–50)
AMMONIA PLAS-SCNC: 40 UMOL/L (ref 11–45)
ANA INTERPRETIVE COMMENT Q5143: ABNORMAL
ANA PATTERN Q5144: ABNORMAL
ANA TITER Q5145: ABNORMAL
ANION GAP SERPL CALC-SCNC: 9 MMOL/L (ref 7–16)
ANTINUCLEAR ANTIBODY (ANA), HEP-2, IGG Q5142: DETECTED
APPEARANCE UR: ABNORMAL
AST SERPL-CCNC: 175 U/L (ref 12–45)
BACTERIA #/AREA URNS HPF: ABNORMAL /HPF
BASE EXCESS BLDV CALC-SCNC: -6 MMOL/L
BASOPHILS # BLD AUTO: 1 % (ref 0–1.8)
BASOPHILS # BLD: 0.1 K/UL (ref 0–0.12)
BILIRUB SERPL-MCNC: 11.2 MG/DL (ref 0.1–1.5)
BILIRUB UR QL STRIP.AUTO: ABNORMAL
BODY TEMPERATURE: ABNORMAL CENTIGRADE
BUN SERPL-MCNC: 12 MG/DL (ref 8–22)
CALCIUM SERPL-MCNC: 8.2 MG/DL (ref 8.4–10.2)
CHLORIDE SERPL-SCNC: 103 MMOL/L (ref 96–112)
CO2 SERPL-SCNC: 20 MMOL/L (ref 20–33)
COLOR UR: YELLOW
CREAT SERPL-MCNC: 0.62 MG/DL (ref 0.5–1.4)
EOSINOPHIL # BLD AUTO: 0.28 K/UL (ref 0–0.51)
EOSINOPHIL NFR BLD: 2.9 % (ref 0–6.9)
EPI CELLS #/AREA URNS HPF: ABNORMAL /HPF
ERYTHROCYTE [DISTWIDTH] IN BLOOD BY AUTOMATED COUNT: 50.4 FL (ref 35.9–50)
ETHANOL BLD-MCNC: <10.1 MG/DL (ref 0–10)
ETHANOL BLD-MCNC: <10.1 MG/DL (ref 0–10)
FLUAV RNA SPEC QL NAA+PROBE: NEGATIVE
FLUBV RNA SPEC QL NAA+PROBE: NEGATIVE
GLOBULIN SER CALC-MCNC: 3.2 G/DL (ref 1.9–3.5)
GLUCOSE BLD-MCNC: 210 MG/DL (ref 65–99)
GLUCOSE BLD-MCNC: 225 MG/DL (ref 65–99)
GLUCOSE BLD-MCNC: 250 MG/DL (ref 65–99)
GLUCOSE SERPL-MCNC: 411 MG/DL (ref 65–99)
GLUCOSE UR STRIP.AUTO-MCNC: >=1000 MG/DL
HCO3 BLDV-SCNC: 20 MMOL/L (ref 24–28)
HCT VFR BLD AUTO: 27.5 % (ref 37–47)
HGB BLD-MCNC: 8.6 G/DL (ref 12–16)
HSV DNA SPEC QL NAA+PROBE: NOT DETECTED
IMM GRANULOCYTES # BLD AUTO: 0.06 K/UL (ref 0–0.11)
IMM GRANULOCYTES NFR BLD AUTO: 0.6 % (ref 0–0.9)
INR PPP: 1.04 (ref 0.87–1.13)
KETONES UR STRIP.AUTO-MCNC: NEGATIVE MG/DL
LEUKOCYTE ESTERASE UR QL STRIP.AUTO: NEGATIVE
LIPASE SERPL-CCNC: 23 U/L (ref 7–58)
LYMPHOCYTES # BLD AUTO: 1.28 K/UL (ref 1–4.8)
LYMPHOCYTES NFR BLD: 13.4 % (ref 22–41)
MCH RBC QN AUTO: 31.7 PG (ref 27–33)
MCHC RBC AUTO-ENTMCNC: 31.3 G/DL (ref 33.6–35)
MCV RBC AUTO: 101.5 FL (ref 81.4–97.8)
MICRO URNS: ABNORMAL
MONOCYTES # BLD AUTO: 1.15 K/UL (ref 0–0.85)
MONOCYTES NFR BLD AUTO: 12 % (ref 0–13.4)
MUCOUS THREADS #/AREA URNS HPF: ABNORMAL /HPF
NEUTROPHILS # BLD AUTO: 6.71 K/UL (ref 2–7.15)
NEUTROPHILS NFR BLD: 70.1 % (ref 44–72)
NITRITE UR QL STRIP.AUTO: NEGATIVE
NRBC # BLD AUTO: 0 K/UL
NRBC BLD-RTO: 0 /100 WBC
PATHOLOGY CONSULT NOTE: NORMAL
PCO2 BLDV: 38.2 MMHG (ref 41–51)
PH BLDV: 7.33 [PH] (ref 7.31–7.45)
PH UR STRIP.AUTO: 6 [PH] (ref 5–8)
PLATELET # BLD AUTO: 342 K/UL (ref 164–446)
PMV BLD AUTO: 10.9 FL (ref 9–12.9)
PO2 BLDV: 30.9 MMHG (ref 25–40)
POTASSIUM SERPL-SCNC: 3.8 MMOL/L (ref 3.6–5.5)
PROT SERPL-MCNC: 6.1 G/DL (ref 6–8.2)
PROT UR QL STRIP: NEGATIVE MG/DL
PROTHROMBIN TIME: 12.8 SEC (ref 12–14.6)
RBC # BLD AUTO: 2.71 M/UL (ref 4.2–5.4)
RBC # URNS HPF: ABNORMAL /HPF
RBC UR QL AUTO: ABNORMAL
RSV RNA SPEC QL NAA+PROBE: NEGATIVE
SAO2 % BLDV: 55 %
SARS-COV-2 RNA RESP QL NAA+PROBE: NOTDETECTED
SODIUM SERPL-SCNC: 132 MMOL/L (ref 135–145)
SP GR UR STRIP.AUTO: 1.01
SPECIMEN SOURCE: NORMAL
SPECIMEN SOURCE: NORMAL
TROPONIN T SERPL-MCNC: 23 NG/L (ref 6–19)
VZV IGG SER IA-ACNC: 1925 IV
VZV IGM SER IA-ACNC: 0.74 ISR
WBC # BLD AUTO: 9.6 K/UL (ref 4.8–10.8)
WBC #/AREA URNS HPF: ABNORMAL /HPF

## 2021-09-28 PROCEDURE — 82607 VITAMIN B-12: CPT

## 2021-09-28 PROCEDURE — 700111 HCHG RX REV CODE 636 W/ 250 OVERRIDE (IP): Performed by: RADIOLOGY

## 2021-09-28 PROCEDURE — 96374 THER/PROPH/DIAG INJ IV PUSH: CPT

## 2021-09-28 PROCEDURE — 700111 HCHG RX REV CODE 636 W/ 250 OVERRIDE (IP)

## 2021-09-28 PROCEDURE — 88312 SPECIAL STAINS GROUP 1: CPT

## 2021-09-28 PROCEDURE — A9270 NON-COVERED ITEM OR SERVICE: HCPCS | Performed by: EMERGENCY MEDICINE

## 2021-09-28 PROCEDURE — 88342 IMHCHEM/IMCYTCHM 1ST ANTB: CPT

## 2021-09-28 PROCEDURE — 81001 URINALYSIS AUTO W/SCOPE: CPT

## 2021-09-28 PROCEDURE — 82140 ASSAY OF AMMONIA: CPT

## 2021-09-28 PROCEDURE — 83690 ASSAY OF LIPASE: CPT

## 2021-09-28 PROCEDURE — 700102 HCHG RX REV CODE 250 W/ 637 OVERRIDE(OP): Performed by: EMERGENCY MEDICINE

## 2021-09-28 PROCEDURE — 86235 NUCLEAR ANTIGEN ANTIBODY: CPT | Mod: 91

## 2021-09-28 PROCEDURE — 88307 TISSUE EXAM BY PATHOLOGIST: CPT

## 2021-09-28 PROCEDURE — 99223 1ST HOSP IP/OBS HIGH 75: CPT | Mod: AI | Performed by: FAMILY MEDICINE

## 2021-09-28 PROCEDURE — 82077 ASSAY SPEC XCP UR&BREATH IA: CPT

## 2021-09-28 PROCEDURE — 86225 DNA ANTIBODY NATIVE: CPT

## 2021-09-28 PROCEDURE — 82962 GLUCOSE BLOOD TEST: CPT | Mod: 91

## 2021-09-28 PROCEDURE — 0FB03ZX EXCISION OF LIVER, PERCUTANEOUS APPROACH, DIAGNOSTIC: ICD-10-PCS | Performed by: RADIOLOGY

## 2021-09-28 PROCEDURE — 88313 SPECIAL STAINS GROUP 2: CPT | Mod: 91

## 2021-09-28 PROCEDURE — 700102 HCHG RX REV CODE 250 W/ 637 OVERRIDE(OP): Performed by: FAMILY MEDICINE

## 2021-09-28 PROCEDURE — 770006 HCHG ROOM/CARE - MED/SURG/GYN SEMI*

## 2021-09-28 PROCEDURE — 88323 CONSLTJ&REPRT MATRL PREP SLD: CPT

## 2021-09-28 PROCEDURE — 86039 ANTINUCLEAR ANTIBODIES (ANA): CPT

## 2021-09-28 PROCEDURE — 83516 IMMUNOASSAY NONANTIBODY: CPT

## 2021-09-28 PROCEDURE — 85025 COMPLETE CBC W/AUTO DIFF WBC: CPT

## 2021-09-28 PROCEDURE — 4410012 CT-BIOPSY-LIVER PERCUTANEOUS

## 2021-09-28 PROCEDURE — 86038 ANTINUCLEAR ANTIBODIES: CPT

## 2021-09-28 PROCEDURE — A9270 NON-COVERED ITEM OR SERVICE: HCPCS | Performed by: FAMILY MEDICINE

## 2021-09-28 PROCEDURE — 83930 ASSAY OF BLOOD OSMOLALITY: CPT

## 2021-09-28 PROCEDURE — 82787 IGG 1 2 3 OR 4 EACH: CPT

## 2021-09-28 PROCEDURE — 80053 COMPREHEN METABOLIC PANEL: CPT

## 2021-09-28 PROCEDURE — 85610 PROTHROMBIN TIME: CPT

## 2021-09-28 PROCEDURE — 96376 TX/PRO/DX INJ SAME DRUG ADON: CPT

## 2021-09-28 PROCEDURE — 84484 ASSAY OF TROPONIN QUANT: CPT

## 2021-09-28 PROCEDURE — 82803 BLOOD GASES ANY COMBINATION: CPT

## 2021-09-28 PROCEDURE — 96375 TX/PRO/DX INJ NEW DRUG ADDON: CPT

## 2021-09-28 PROCEDURE — 0241U HCHG SARS-COV-2 COVID-19 NFCT DS RESP RNA 4 TRGT MIC: CPT

## 2021-09-28 PROCEDURE — 700111 HCHG RX REV CODE 636 W/ 250 OVERRIDE (IP): Performed by: FAMILY MEDICINE

## 2021-09-28 PROCEDURE — 86376 MICROSOMAL ANTIBODY EACH: CPT

## 2021-09-28 PROCEDURE — 76705 ECHO EXAM OF ABDOMEN: CPT

## 2021-09-28 PROCEDURE — 700111 HCHG RX REV CODE 636 W/ 250 OVERRIDE (IP): Performed by: EMERGENCY MEDICINE

## 2021-09-28 RX ORDER — PROMETHAZINE HYDROCHLORIDE 25 MG/1
12.5-25 TABLET ORAL EVERY 4 HOURS PRN
Status: DISCONTINUED | OUTPATIENT
Start: 2021-09-28 | End: 2021-09-30 | Stop reason: HOSPADM

## 2021-09-28 RX ORDER — NICOTINE 21 MG/24HR
1 PATCH, TRANSDERMAL 24 HOURS TRANSDERMAL DAILY
Status: DISCONTINUED | OUTPATIENT
Start: 2021-09-28 | End: 2021-09-30 | Stop reason: HOSPADM

## 2021-09-28 RX ORDER — OXYCODONE HYDROCHLORIDE 10 MG/1
10 TABLET ORAL
Status: DISCONTINUED | OUTPATIENT
Start: 2021-09-28 | End: 2021-09-30 | Stop reason: HOSPADM

## 2021-09-28 RX ORDER — BISACODYL 10 MG
10 SUPPOSITORY, RECTAL RECTAL
Status: DISCONTINUED | OUTPATIENT
Start: 2021-09-28 | End: 2021-09-29

## 2021-09-28 RX ORDER — INSULIN LISPRO 100 [IU]/ML
1 INJECTION, SOLUTION INTRAVENOUS; SUBCUTANEOUS
Status: DISCONTINUED | OUTPATIENT
Start: 2021-09-28 | End: 2021-09-28

## 2021-09-28 RX ORDER — ALPRAZOLAM 0.5 MG/1
0.5 TABLET ORAL ONCE
Status: COMPLETED | OUTPATIENT
Start: 2021-09-28 | End: 2021-09-28

## 2021-09-28 RX ORDER — ALUMINA, MAGNESIA, AND SIMETHICONE 2400; 2400; 240 MG/30ML; MG/30ML; MG/30ML
10 SUSPENSION ORAL ONCE
Status: COMPLETED | OUTPATIENT
Start: 2021-09-28 | End: 2021-09-28

## 2021-09-28 RX ORDER — CHOLECALCIFEROL (VITAMIN D3) 125 MCG
1000 CAPSULE ORAL DAILY
Status: DISCONTINUED | OUTPATIENT
Start: 2021-09-28 | End: 2021-09-30 | Stop reason: HOSPADM

## 2021-09-28 RX ORDER — CLONIDINE HYDROCHLORIDE 0.1 MG/1
0.1 TABLET ORAL EVERY 6 HOURS PRN
Status: DISCONTINUED | OUTPATIENT
Start: 2021-09-28 | End: 2021-09-30 | Stop reason: HOSPADM

## 2021-09-28 RX ORDER — LEVOTHYROXINE SODIUM 112 UG/1
224 TABLET ORAL ONCE
Status: COMPLETED | OUTPATIENT
Start: 2021-09-28 | End: 2021-09-28

## 2021-09-28 RX ORDER — LORAZEPAM 0.5 MG/1
0.5 TABLET ORAL EVERY 4 HOURS PRN
Status: DISCONTINUED | OUTPATIENT
Start: 2021-09-28 | End: 2021-09-30 | Stop reason: HOSPADM

## 2021-09-28 RX ORDER — GAUZE BANDAGE 2" X 2"
100 BANDAGE TOPICAL ONCE
Status: COMPLETED | OUTPATIENT
Start: 2021-09-28 | End: 2021-09-28

## 2021-09-28 RX ORDER — ONDANSETRON 4 MG/1
4 TABLET, ORALLY DISINTEGRATING ORAL EVERY 4 HOURS PRN
Status: DISCONTINUED | OUTPATIENT
Start: 2021-09-28 | End: 2021-09-30 | Stop reason: HOSPADM

## 2021-09-28 RX ORDER — HYDROMORPHONE HYDROCHLORIDE 1 MG/ML
0.5 INJECTION, SOLUTION INTRAMUSCULAR; INTRAVENOUS; SUBCUTANEOUS
Status: DISCONTINUED | OUTPATIENT
Start: 2021-09-28 | End: 2021-09-30 | Stop reason: HOSPADM

## 2021-09-28 RX ORDER — LEVOTHYROXINE SODIUM 0.05 MG/1
25 TABLET ORAL
Status: DISCONTINUED | OUTPATIENT
Start: 2021-09-28 | End: 2021-09-28

## 2021-09-28 RX ORDER — CLOPIDOGREL BISULFATE 75 MG/1
75 TABLET ORAL
COMMUNITY
Start: 2021-08-16 | End: 2021-10-07

## 2021-09-28 RX ORDER — HYDROMORPHONE HYDROCHLORIDE 1 MG/ML
INJECTION, SOLUTION INTRAMUSCULAR; INTRAVENOUS; SUBCUTANEOUS
Status: COMPLETED
Start: 2021-09-28 | End: 2021-09-28

## 2021-09-28 RX ORDER — PROMETHAZINE HYDROCHLORIDE 25 MG/1
12.5-25 SUPPOSITORY RECTAL EVERY 4 HOURS PRN
Status: DISCONTINUED | OUTPATIENT
Start: 2021-09-28 | End: 2021-09-30 | Stop reason: HOSPADM

## 2021-09-28 RX ORDER — DEXTROSE MONOHYDRATE 25 G/50ML
50 INJECTION, SOLUTION INTRAVENOUS
Status: DISCONTINUED | OUTPATIENT
Start: 2021-09-28 | End: 2021-09-30 | Stop reason: HOSPADM

## 2021-09-28 RX ORDER — MIDAZOLAM HYDROCHLORIDE 1 MG/ML
INJECTION INTRAMUSCULAR; INTRAVENOUS
Status: COMPLETED
Start: 2021-09-28 | End: 2021-09-28

## 2021-09-28 RX ORDER — NALOXONE HYDROCHLORIDE 0.4 MG/ML
INJECTION, SOLUTION INTRAMUSCULAR; INTRAVENOUS; SUBCUTANEOUS
Status: ACTIVE
Start: 2021-09-28 | End: 2021-09-29

## 2021-09-28 RX ORDER — SODIUM CHLORIDE 9 MG/ML
500 INJECTION, SOLUTION INTRAVENOUS
Status: ACTIVE | OUTPATIENT
Start: 2021-09-28 | End: 2021-09-28

## 2021-09-28 RX ORDER — ONDANSETRON 2 MG/ML
4 INJECTION INTRAMUSCULAR; INTRAVENOUS EVERY 4 HOURS PRN
Status: DISCONTINUED | OUTPATIENT
Start: 2021-09-28 | End: 2021-09-30 | Stop reason: HOSPADM

## 2021-09-28 RX ORDER — OXYCODONE HYDROCHLORIDE 5 MG/1
5 TABLET ORAL
Status: DISCONTINUED | OUTPATIENT
Start: 2021-09-28 | End: 2021-09-30 | Stop reason: HOSPADM

## 2021-09-28 RX ORDER — VITAMIN B COMPLEX
1000 TABLET ORAL DAILY
Status: DISCONTINUED | OUTPATIENT
Start: 2021-09-28 | End: 2021-09-30 | Stop reason: HOSPADM

## 2021-09-28 RX ORDER — PROCHLORPERAZINE EDISYLATE 5 MG/ML
5-10 INJECTION INTRAMUSCULAR; INTRAVENOUS EVERY 4 HOURS PRN
Status: DISCONTINUED | OUTPATIENT
Start: 2021-09-28 | End: 2021-09-30 | Stop reason: HOSPADM

## 2021-09-28 RX ORDER — ASPIRIN 81 MG/1
81 TABLET, CHEWABLE ORAL DAILY
Status: DISCONTINUED | OUTPATIENT
Start: 2021-09-28 | End: 2021-09-28

## 2021-09-28 RX ORDER — NICOTINE 21 MG/24HR
1 PATCH, TRANSDERMAL 24 HOURS TRANSDERMAL
Status: DISCONTINUED | OUTPATIENT
Start: 2021-09-28 | End: 2021-09-28

## 2021-09-28 RX ORDER — OMEPRAZOLE 20 MG/1
40 CAPSULE, DELAYED RELEASE ORAL 2 TIMES DAILY
Status: DISCONTINUED | OUTPATIENT
Start: 2021-09-28 | End: 2021-09-30 | Stop reason: HOSPADM

## 2021-09-28 RX ORDER — AMLODIPINE BESYLATE 5 MG/1
5 TABLET ORAL
Status: DISCONTINUED | OUTPATIENT
Start: 2021-09-28 | End: 2021-09-30 | Stop reason: HOSPADM

## 2021-09-28 RX ORDER — ENALAPRILAT 1.25 MG/ML
1.25 INJECTION INTRAVENOUS EVERY 6 HOURS PRN
Status: DISCONTINUED | OUTPATIENT
Start: 2021-09-28 | End: 2021-09-30 | Stop reason: HOSPADM

## 2021-09-28 RX ORDER — MIDAZOLAM HYDROCHLORIDE 1 MG/ML
.5-2 INJECTION INTRAMUSCULAR; INTRAVENOUS PRN
Status: ACTIVE | OUTPATIENT
Start: 2021-09-28 | End: 2021-09-28

## 2021-09-28 RX ORDER — METOPROLOL SUCCINATE 100 MG/1
200 TABLET, EXTENDED RELEASE ORAL DAILY
Status: DISCONTINUED | OUTPATIENT
Start: 2021-09-29 | End: 2021-09-30 | Stop reason: HOSPADM

## 2021-09-28 RX ORDER — INSULIN DEGLUDEC 100 U/ML
24 INJECTION, SOLUTION SUBCUTANEOUS EVERY EVENING
COMMUNITY
Start: 2021-08-16 | End: 2022-10-24 | Stop reason: SDUPTHER

## 2021-09-28 RX ORDER — LEVOTHYROXINE SODIUM 0.1 MG/1
200 TABLET ORAL
Status: DISCONTINUED | OUTPATIENT
Start: 2021-09-29 | End: 2021-09-30 | Stop reason: HOSPADM

## 2021-09-28 RX ORDER — POLYETHYLENE GLYCOL 3350 17 G/17G
1 POWDER, FOR SOLUTION ORAL
Status: DISCONTINUED | OUTPATIENT
Start: 2021-09-28 | End: 2021-09-29

## 2021-09-28 RX ORDER — METOPROLOL SUCCINATE 200 MG/1
200 TABLET, EXTENDED RELEASE ORAL DAILY
Status: DISCONTINUED | OUTPATIENT
Start: 2021-09-28 | End: 2021-09-28

## 2021-09-28 RX ORDER — LEVOTHYROXINE SODIUM 0.05 MG/1
200 TABLET ORAL
Status: DISCONTINUED | OUTPATIENT
Start: 2021-09-28 | End: 2021-09-28

## 2021-09-28 RX ORDER — LEVOTHYROXINE SODIUM 0.03 MG/1
25 TABLET ORAL
Status: DISCONTINUED | OUTPATIENT
Start: 2021-09-29 | End: 2021-09-30 | Stop reason: HOSPADM

## 2021-09-28 RX ORDER — LABETALOL HYDROCHLORIDE 5 MG/ML
10 INJECTION, SOLUTION INTRAVENOUS EVERY 4 HOURS PRN
Status: DISCONTINUED | OUTPATIENT
Start: 2021-09-28 | End: 2021-09-30 | Stop reason: HOSPADM

## 2021-09-28 RX ORDER — ALPRAZOLAM 0.5 MG/1
0.5 TABLET ORAL 3 TIMES DAILY PRN
COMMUNITY
Start: 2021-08-16 | End: 2021-11-19

## 2021-09-28 RX ORDER — ONDANSETRON 2 MG/ML
4 INJECTION INTRAMUSCULAR; INTRAVENOUS PRN
Status: ACTIVE | OUTPATIENT
Start: 2021-09-28 | End: 2021-09-28

## 2021-09-28 RX ORDER — METOPROLOL TARTRATE 50 MG/1
100 TABLET, FILM COATED ORAL TWICE DAILY
Status: DISCONTINUED | OUTPATIENT
Start: 2021-09-28 | End: 2021-09-28

## 2021-09-28 RX ORDER — HYDROMORPHONE HYDROCHLORIDE 1 MG/ML
0.5 INJECTION, SOLUTION INTRAMUSCULAR; INTRAVENOUS; SUBCUTANEOUS ONCE
Status: COMPLETED | OUTPATIENT
Start: 2021-09-28 | End: 2021-09-28

## 2021-09-28 RX ORDER — OMEPRAZOLE 20 MG/1
40 CAPSULE, DELAYED RELEASE ORAL DAILY
Status: DISCONTINUED | OUTPATIENT
Start: 2021-09-28 | End: 2021-09-28

## 2021-09-28 RX ADMIN — FENTANYL CITRATE 50 MCG: 50 INJECTION, SOLUTION INTRAMUSCULAR; INTRAVENOUS at 01:03

## 2021-09-28 RX ADMIN — ASPIRIN 81 MG CHEWABLE TABLET 81 MG: 81 TABLET CHEWABLE at 05:55

## 2021-09-28 RX ADMIN — HYDROMORPHONE HYDROCHLORIDE 0.5 MG: 1 INJECTION, SOLUTION INTRAMUSCULAR; INTRAVENOUS; SUBCUTANEOUS at 08:47

## 2021-09-28 RX ADMIN — CYANOCOBALAMIN TAB 500 MCG 1000 MCG: 500 TAB at 11:56

## 2021-09-28 RX ADMIN — OMEPRAZOLE 40 MG: 20 CAPSULE, DELAYED RELEASE ORAL at 18:07

## 2021-09-28 RX ADMIN — ALUMINUM HYDROXIDE, MAGNESIUM HYDROXIDE, AND DIMETHICONE 10 ML: 400; 400; 40 SUSPENSION ORAL at 01:06

## 2021-09-28 RX ADMIN — HYDROMORPHONE HYDROCHLORIDE 0.5 MG: 1 INJECTION, SOLUTION INTRAMUSCULAR; INTRAVENOUS; SUBCUTANEOUS at 03:32

## 2021-09-28 RX ADMIN — INSULIN HUMAN 3 UNITS: 100 INJECTION, SOLUTION PARENTERAL at 23:12

## 2021-09-28 RX ADMIN — Medication 100 MG: at 05:57

## 2021-09-28 RX ADMIN — METOPROLOL TARTRATE 100 MG: 50 TABLET, FILM COATED ORAL at 03:59

## 2021-09-28 RX ADMIN — MIDAZOLAM HYDROCHLORIDE 1 MG: 1 INJECTION, SOLUTION INTRAMUSCULAR; INTRAVENOUS at 16:05

## 2021-09-28 RX ADMIN — LIDOCAINE HYDROCHLORIDE 15 ML: 20 SOLUTION OROPHARYNGEAL at 13:51

## 2021-09-28 RX ADMIN — MIDAZOLAM HYDROCHLORIDE 1 MG: 1 INJECTION, SOLUTION INTRAMUSCULAR; INTRAVENOUS at 16:10

## 2021-09-28 RX ADMIN — FENTANYL CITRATE 50 MCG: 50 INJECTION, SOLUTION INTRAMUSCULAR; INTRAVENOUS at 16:05

## 2021-09-28 RX ADMIN — OXYCODONE HYDROCHLORIDE 10 MG: 10 TABLET ORAL at 20:54

## 2021-09-28 RX ADMIN — NICOTINE 14 MG: 14 PATCH TRANSDERMAL at 11:57

## 2021-09-28 RX ADMIN — ALPRAZOLAM 0.5 MG: 0.5 TABLET ORAL at 03:57

## 2021-09-28 RX ADMIN — AMLODIPINE BESYLATE 5 MG: 5 TABLET ORAL at 20:09

## 2021-09-28 RX ADMIN — LEVOTHYROXINE SODIUM 200 MCG: 0.1 TABLET ORAL at 23:09

## 2021-09-28 RX ADMIN — SERTRALINE 100 MG: 50 TABLET, FILM COATED ORAL at 11:56

## 2021-09-28 RX ADMIN — OMEPRAZOLE 40 MG: 20 CAPSULE, DELAYED RELEASE ORAL at 05:56

## 2021-09-28 RX ADMIN — CHOLECALCIFEROL TAB 25 MCG (1000 UNIT) 1000 UNITS: 25 TAB at 05:57

## 2021-09-28 RX ADMIN — ONDANSETRON 4 MG: 2 INJECTION INTRAMUSCULAR; INTRAVENOUS at 13:31

## 2021-09-28 RX ADMIN — HYDROMORPHONE HYDROCHLORIDE 0.5 MG: 1 INJECTION, SOLUTION INTRAMUSCULAR; INTRAVENOUS; SUBCUTANEOUS at 14:10

## 2021-09-28 RX ADMIN — FENTANYL CITRATE 50 MCG: 50 INJECTION, SOLUTION INTRAMUSCULAR; INTRAVENOUS at 16:10

## 2021-09-28 RX ADMIN — THERA TABS 1 TABLET: TAB at 05:56

## 2021-09-28 RX ADMIN — LEVOTHYROXINE SODIUM 224 MCG: 0.11 TABLET ORAL at 03:58

## 2021-09-28 ASSESSMENT — ENCOUNTER SYMPTOMS
MYALGIAS: 0
FEVER: 0
NAUSEA: 1
CHILLS: 0
CONSTIPATION: 0
SHORTNESS OF BREATH: 0
ABDOMINAL PAIN: 1
COUGH: 0
VOMITING: 1
DIARRHEA: 0
BLOOD IN STOOL: 0
HEARTBURN: 0

## 2021-09-28 ASSESSMENT — PAIN DESCRIPTION - PAIN TYPE: TYPE: CHRONIC PAIN

## 2021-09-28 ASSESSMENT — PAIN SCALES - WONG BAKER: WONGBAKER_NUMERICALRESPONSE: DOESN'T HURT AT ALL

## 2021-09-28 ASSESSMENT — FIBROSIS 4 INDEX: FIB4 SCORE: 3.02

## 2021-09-28 NOTE — ASSESSMENT & PLAN NOTE
- M[facility]Y placement in August of 2020  - Given anticipated liver biopsy, hold ASA and Plavix - did get ASA this am in the ED

## 2021-09-28 NOTE — ED NOTES
Received call from Transfer Center.  Pt pending acceptance at [facility] pending Hepatologist review s/p rounds.

## 2021-09-28 NOTE — ED PROVIDER NOTES
"ED Provider Note      Means of Arrival: EMS  History obtained from: Patient, medical record, EMS    CHIEF COMPLAINT  Chief Complaint   Patient presents with   • Abdominal Pain     Patient MEGA REMSA from home with upper abd pain. Patient is newly Dx liver failure, went to [place] today to see specialist but they gave away her bed so she returned home.    • Hyperglycemia     DM type 1, BG by  after 1.5L IV NS.       HPI  Anu Manuel is a 66 y.o. female who presents with abdominal pain.  The patient was recently hospitalized here and was found to have acute liver failure.  She was ultimately transferred to [facility] in [place] for quaternary care for work-up.  She left there AMA earlier in the day today and was driven home by her family.  Since just before her discharge she has been reporting epigastric abdominal pain that is constant, with no alleviating or aggravating factors.  The pain does not radiate.  She denies any fevers, vomiting, diarrhea, hematochezia.  The patient did receive IV fluids from EMS.    REVIEW OF SYSTEMS  CONSTITUTIONAL:  No fever.  CARDIOVASCULAR:  No chest discomfort.  RESPIRATORY:  No pleuritic chest pain.  GASTROINTESTINAL:  See HPI  GENITOURINARY:   Urine change      See HPI for further details.   All other systems are negative.     PAST MEDICAL HISTORY  Past Medical History:   Diagnosis Date   • Adverse effect of anesthesia     in 2008 \"throat closes up\"\"anxiety\" ?laryngospasm, kept in ICU. Pt states no problems currently 2018.    • Anesthesia     in 2008 \"throat closes up\"\"anxiety\" ?laryngospasm, kept in ICU. Pt states no problems currently 2018.    • Arthritis     right shoulder, hands   • Cigarette smoker quit 2013   • Dental disorder     missing teeth    • depression 8/30/2016    denies depression, states has anxiety and panic attacks   • Diabetes mellitus type 1 (HCC) 1989    insulin   • Encounter for long-term (current) use of " "insulin (HCC) 9/25/2013   • Heart burn    • High cholesterol    • Hypertension    • Hypothyroidism, postsurgical 1970   • Indigestion    • Infectious disease      had hepatitis C, tested neg.   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.63) 6/10/2015       FAMILY HISTORY  Family History   Problem Relation Age of Onset   • Hypertension Mother    • Cancer Father        SOCIAL HISTORY   reports that she has been smoking cigarettes. She has a 30.00 pack-year smoking history. She has never used smokeless tobacco. She reports current alcohol use. She reports that she does not use drugs.    SURGICAL HISTORY  Past Surgical History:   Procedure Laterality Date   • CATH PLACEMENT  1/25/2020    Procedure: INSERTION, CATHETER PERM;  Surgeon: Rola Mendoza M.D.;  Location: [facility];  Service: General   • IRRIGATION & DEBRIDEMENT NEURO  1/19/2020    Procedure: IRRIGATION AND DEBRIDEMENT, WOUND THORACIC AND LUMBAR;  Surgeon: Ryan Roman M.D.;  Location: [facility];  Service: Neurosurgery   • PB IMPLANT NEUROSTIM/ N/A 12/16/2019    Procedure: EXPLORATION AT THORACIC 8 - 9, REPLACEMENT OF  NEUROSTIMULATOR LEAD;  Surgeon: Ryan Roman M.D.;  Location: [facility];  Service: Neurosurgery   • SPINAL CORD STIMULATOR N/A 10/26/2018    Procedure: SPINAL CORD STIMULATOR;  Surgeon: Ryan Roman M.D.;  Location: [facility];  Service: Neurosurgery   • THORACIC LAMINECTOMY N/A 10/26/2018    Procedure: THORACIC LAMINECTOMY - FOR;  Surgeon: Ryan Roman M.D.;  Location: [facility];  Service: Neurosurgery   • CT NJX AA&/STRD TFRML EPI LUMBAR/SACRAL 1 LEVEL Right 8/31/2016    Procedure: INJ-FORAMEN EPI LUM/SAC SNGL L4-5;  Surgeon: " "Sukhi Godfrey M.D.;  Location: SURGERY [facility];  Service: Pain Management   • LUMBAR LAMINECTOMY DISKECTOMY Right 5/10/2016    Procedure: LUMBAR L4-5 HEMILAMINECTOMY DISKECTOMY ;  Surgeon: Arnold Keyes M.D.;  Location: [facility];  Service:    • CERVICAL FUSION POSTERIOR  1/16/2009    Performed by TARA CONTRERAS at [facility]   • HARDWARE REMOVAL NEURO  1/16/2009    Performed by TARA CONTRERAS at [facility]   • NECK EXPLORATION  1/16/2009    Performed by TARA CONTRERAS at [facility]   • SHOULDER ARTHROSCOPY W/ ROTATOR CUFF REPAIR  10/9/08    Performed by SHERLY CASTANEDA at [facility]   • SHOULDER DECOMPRESSION ARTHROSCOPIC  6/17/08    Performed by SHERLY CASTANEDA at [facility]   • CLAVICLE DISTAL EXCISION  6/17/08    Performed by SHERLY CASTANEDA at [facility]   • CERVICAL DISK AND FUSION ANTERIOR  03/12/08   • HYSTERECTOMY, VAGINAL  2006   • APPENDECTOMY  2004   • THYROID LOBECTOMY  1973   • TONSILLECTOMY  1963   • ABDOMINAL HYSTERECTOMY TOTAL     • LUMPECTOMY  1976, 2005    Breast    • LUMPECTOMY         CURRENT MEDICATIONS  Home Medications     Reviewed by Yamini Glez R.N. (Registered Nurse) on 09/28/21 at 0006  Med List Status: Complete   Medication Last Dose Status   amLODIPine (NORVASC) 10 MG Tab  Active   aspirin 81 MG EC tablet  Active   Continuous Blood Gluc Sensor (FREESTYLE PARISA 14 DAY SENSOR) Misc  Active   insulin aspart (NOVOLOG) 100 UNIT/ML Solution  Active   metoprolol (TOPROL-XL) 200 MG XL tablet  Active   sertraline (ZOLOFT) 100 MG Tab  Active   SYNTHROID 200 MCG Tab  Active   SYNTHROID 25 MCG Tab  Active                ALLERGIES  Allergies   Allergen Reactions   • Tape      Blisters, paper tape is ok       PHYSICAL EXAM  VITAL SIGNS: /61   Pulse 73   Temp 36.3 °C (97.3 °F) (Temporal)   Resp 16   Ht 1.676 m (5' 6\")   Wt 62.3 kg (137 lb 5.6 oz)   LMP 04/29/2005 (LMP Unknown)   SpO2 " 93%   BMI 22.17 kg/m²    Gen: Alert  HENT: ATNC  Eyes: Scleral icterus  Neck: trachea midline  Resp: no respiratory distress  CV: No JVD, RRR, no murmurs, rubs, gallops  Abd: non-distended, tenderness to epigastrium and right upper quadrant.  Ext: No deformities  Psych: normal mood  Neuro: speech fluent, no asterixis.  GCS 15  Skin: Diffuse jaundice.      RADIOLOGY/PROCEDURES  US-RUQ   Final Result      1.  Gallstones within the gallbladder. The gallbladder is contracted No evidence of biliary ductal dilatation      2.  The liver is echogenic consistent with fatty change versus hepatocellular dysfunction.          LABS  Labs Reviewed   CBC WITH DIFFERENTIAL - Abnormal; Notable for the following components:       Result Value    RBC 2.71 (*)     Hemoglobin 8.6 (*)     Hematocrit 27.5 (*)     .5 (*)     MCHC 31.3 (*)     RDW 50.4 (*)     Lymphocytes 13.40 (*)     Monos (Absolute) 1.15 (*)     All other components within normal limits   COMP METABOLIC PANEL - Abnormal; Notable for the following components:    Sodium 132 (*)     Glucose 411 (*)     Calcium 8.2 (*)     AST(SGOT) 175 (*)     ALT(SGPT) 172 (*)     Alkaline Phosphatase 2183 (*)     Total Bilirubin 11.2 (*)     Albumin 2.9 (*)     All other components within normal limits   VENOUS BLOOD GAS - Abnormal; Notable for the following components:    Venous Bg Pco2 38.2 (*)     Venous Bg Hco3 20 (*)     All other components within normal limits   URINALYSIS - Abnormal; Notable for the following components:    Character Hazy (*)     Glucose >=1000 (*)     Bilirubin Large (*)     Occult Blood Trace (*)     All other components within normal limits   URINE MICROSCOPIC (W/UA) - Abnormal; Notable for the following components:    RBC 2-5 (*)     Bacteria Few (*)     All other components within normal limits   POCT GLUCOSE DEVICE RESULTS - Abnormal; Notable for the following components:    Glucose - Accu-Ck 250 (*)     All other components within normal limits    LIPASE   PROTHROMBIN TIME   AMMONIA   ESTIMATED GFR   COV-2, FLU A/B, AND RSV BY PCR    Narrative:     Have you been in close contact with a person who is suspected  or known to be positive for COVID-19 within the last 30 days  (e.g. last seen that person < 30 days ago)->Unknown            COURSE & MEDICAL DECISION MAKING  Pertinent Labs & Imaging studies reviewed. (See chart for details)    Review of medical records demonstrates the patient was recently hospitalized here and several days were spent arranging for transfer to [facility] for a hepatology evaluation.  The patient had delays in finding a suitable ground ambulance for transfer, however it seems that insurance would not cover an ambulance to transfer the patient and she was unwilling to pay out-of-pocket.  She then was taken by a family member for the transfer, however did not go directly and given the lower acuity of a private vehicle transfer, the receiving facility gave away the bed.  The patient was then seen in the emergency department and admission orders were placed.  The patient was unhappy with the care and left AGAINST MEDICAL ADVICE.  She had work-up performed on the past several days demonstrating worsening liver failure and elevated meld score.    On arrival, the patient is reporting abdominal pain with right upper quadrant tenderness.  Patient's labs are somewhat consistent with the most recent labs from [facility].  Meld score is 20.  Right upper quadrant ultrasound unrevealing, will obtain CAT scan.  No signs of infection.  Low suspicion for surgical abdomen.    The patient does have hyperglycemia but no evidence of DKA.  She reports that she has taken her long-acting Lantus.    Case was discussed with the transfer center to arrange for transfer to a facility that will be able to properly care for the patient given her liver failure.  The patient is agreeable with this plan to transfer for further work-up.  Transfer center states that  [facility] is full and cannot accept the transfer at this time.  Will trial other locations.  Case discussed with Dr. Singletary, hospitalist, who recommends the patient be transferred from the emergency department as we are not able to provide definitive care for this patient.    Patient has intractable abdominal pain despite multiple doses of IV pain medication.    I reviewed the plan of care and order sets from [facility] in order to continue basic medical management while awaiting transfer.    ED course:    1:20 AM  Pt will be admitted to ED observation at this time for transfer to higher level of care.     7:02 AM  Case discussed with Dr. Michele, hepatologist at [facility]. He declines transfer. He recommends further local workup given high risk of recurrent AMA. He suggests obtaining liver biopsy and he will be able to provide remote guidance on care, including potentially reviewing pathology slides. Should the diagnosis be something untreatable at our facility, the patient can be transferred at that time. Dr. Herrera will discuss the case with Dr. Zavala, the patient's local gastroenterologist.     7:04 AM  Case discussed with Dr. Eddy, who believes that a liver Bx could be performed at [facility], however it is up to the discretion of the day IR specialist to decide.       Appropriate PPE were worn at this encounter.     FINAL IMPRESSION  1. Acute liver failure without hepatic coma    2. Jaundice    3. Right upper quadrant abdominal pain    4. Hyperglycemia    5. Macrocytic anemia      7:20 AM  Patient discharged from ED observation. Patient will be hospitalized by Dr. Cummings in guarded condition    This dictation was created using voice recognition software. The accuracy of the dictation is limited to the abilities of the software. I expect there may be some errors of grammar and possibly content. The nursing notes were reviewed and certain aspects of this information were incorporated into this note.

## 2021-09-28 NOTE — ED NOTES
"IV and PO med's given per order  MD aware pt refused insulin \"I took my long acting one at home.  I don't want to take anything else\"  Pt aware of her blood sugar 411  VSS  Received good effect  Report given to Elvira MORSE   "

## 2021-09-28 NOTE — ASSESSMENT & PLAN NOTE
- Pt relates that she takes 26-30u Tresiba at night and SSI  - Will start 25u qhs tonight with SSI here  - A1c last week was 8.2

## 2021-09-28 NOTE — ED NOTES
Med rec updated and complete  Allergies reviewed  Pt reports that she only takes SYNTHROID 200MCG, not an extra 25MCG.  Pt is not sure when she took her medications last, she has been in and out to the hospital   Pt reports no antibiotics in the last 30 days, that she had to take at home      Current Outpatient Medications on File Prior to Encounter   Medication Sig Dispense Refill   • Pyridoxine HCl (B-6 PO) Take 1 Tablet by mouth every day.     • Cyanocobalamin (B-12 PO) Take 1 Tablet by mouth every day.     • Ascorbic Acid (VITAMIN C PO) Take 1 Tablet by mouth every day.     • Cholecalciferol (D3 PO) Take 1 Capsule by mouth every day.     • TRESIBA FLEXTOUCH 100 UNIT/ML Solution Pen-injector Inject 30 Units under the skin every evening.     • clopidogrel (PLAVIX) 75 MG Tab Take 75 mg by mouth every day.     • ALPRAZolam (XANAX) 0.5 MG Tab Take 0.5 mg by mouth at bedtime.     • sertraline (ZOLOFT) 100 MG Tab Take 100 mg by mouth every day.     • amLODIPine (NORVASC) 10 MG Tab Take 5 mg by mouth every evening. N THE EVENING     • SYNTHROID 200 MCG Tab Take 200 mcg by mouth every day.     • metoprolol (TOPROL-XL) 200 MG XL tablet Take 1 tablet by mouth every day. 90 tablet 3   • Continuous Blood Gluc Sensor (FREESTYLE PARISA 14 DAY SENSOR) Misc 1 MISCELLANEOUS E10.42 CHANGE SENSOR EVERY 14 DAYS   E11.65     • aspirin 81 MG EC tablet Take 1 Tab by mouth every morning. 90 Tab 3   • insulin aspart (NOVOLOG) 100 UNIT/ML Solution Inject 1-5 Units under the skin 3 times a day before meals. 1 units for every 5 g of carbs   AND  Sliding Scale   150 - 200 = 1 units  201 - 250 = 2 units  251 - 300 = 3 units  301 - 350 = 4 units  351 - 400 = 5 units

## 2021-09-28 NOTE — ED TRIAGE NOTES
"Chief Complaint   Patient presents with   • Abdominal Pain     Patient MEGA OBRIEN from home with upper abd pain. Patient is newly Dx liver failure, went to [place] today to see specialist but they gave away her bed so she returned home.    • Hyperglycemia     DM type 1, BG by  after 1.5L IV NS.         BP (!) 166/74   Pulse 70   Temp 36.3 °C (97.3 °F) (Temporal)   Resp 16   Ht 1.676 m (5' 6\")   Wt 62.3 kg (137 lb 5.6 oz)   SpO2 97%     "

## 2021-09-28 NOTE — ASSESSMENT & PLAN NOTE
"- Recently transferred from [facility] to [facility] for Hepatology, but pt left AMA. ERP attempted to transfer her back to [facility] but they declined transfer. Per ERP note - \"Case discussed with Dr. Michele, hepatologist at [facility]. He declines transfer. He recommends further local workup given high risk of recurrent AMA. He suggests obtaining liver biopsy and he will be able to provide remote guidance on care, including potentially reviewing pathology slides. Should the diagnosis be something untreatable at our facility, the patient can be transferred at that time. Dr. Michele will discuss the case with Dr. Zavala, the patient's local gastroenterologist\"  - Denies EtOH or Tylenol, will check serum alcohol and UDS given her AMA status at [facility]   - While at [facility], had elevated GGT, CEA (mild elevated) and elevated Ca 19-9  - While here, had a normal ceruloplasmin, IgG, IgM, AFP, hepatitis panel, CMV, antimitochondrial antibodies, F-actin ab  - Had negative MRCP last week, liver u/s without evidence of thrombosis, and RUQ u/s in the ER this morning with hepatocellular dysfunction  - EBV panel may indicate reactivation of EBV/late acute infection, will discuss interpretation with GI   - SCARLETT positive last stay, will order reflexive panel  -Patient underwent liver biopsy on 9/28, pending results  Gastroenterology completing work-up.  -Liver enzymes improving today, patient continues to improve clinically and enzymes are trending down, possibly follow-up with GI as an outpatient.    "

## 2021-09-28 NOTE — CONSULTS
"Gastroenterology Consult Note:    Nikunj Lenz M.D.  Date & Time note created:    9/28/2021   7:52 AM     Referring MD:  Dr. Cummings    Patient ID:   Name:             Anu Manuel   YOB: 1955  Age:                 66 y.o.  female   MRN:               [id number]                                                             Reason for Consult:      Jaundice, abnormal liver enzymes    History of Present Illness:    66-year-old female with a history of type 1 diabetes coronary artery disease status post PCI August 2020 hypertension hypothyroidism with a recent admission September 23 and nonconclusive liver injury work-up.  Patient gives interval history from [place] [facility] [place] 2 to 3 days ago for jaundice and cholestatic liver injury.  She states that she left because she was treated for bleeding and that she was ignored and rarely visited.  She then developed sudden onset of nonradiating upper epigastric pain with nausea but no vomiting.  She denies rectal bleeding or melena.  She states she has a stools 3-4 times a day.    She recounts that 6 days before this current presentation she developed jaundice for the first time where she seemed to notice she was yellow but then asked her daughter for confirmation.  She was then brought to the emergency room.  She denies fevers and chills.  She has had a few episodes of night sweats and she has lost a lot of weight since her 's death in December.    She has 1 alcoholic beverage a month.  She denies Tylenol use.  She denies NSAID use.  She denies prior endoscopy.  She states that a diagnosis of colitis was a recent development.    [place] [facility] [place]  Pt insisted on leaving AMA, reasons she reported for doing so included that she wanted to have a cigarette, she wanted to see her dog, she was planning a trip to [place], and she \"wanted to feel normal for a day\". Attending, RN, charge RN, and " "hepatologist all had extensive conversations with the pt and her 2 daughters at the bedside to explain the dangers of leaving AMA, with outcomes potentially including worsening acute illness and potentially death. Pt and daughters all expressed understand of the gravity of these outcomes. Daughters at bedside pleaded unsuccessfully with the pt to remain admitted for urgent workup and treatment. Treatment team explained to the pt that in the future we are more than happy to treat her if she changes her mind and represents or requires transfer from another hospital in the near future for higher level care. Pt signed out AMA and left the hospital    [facility] [place] ceruloplasmin at 39  CA 19-9 was found to be elevated to 237  GGT was elevated above 1400  CEA was elevated to 3.3    Review of Systems:      Constitutional: Denies fevers, Denies weight changes  Eyes: Denies changes in vision, no eye pain  Ears/Nose/Throat/Mouth: Denies nasal congestion or sore throat   Cardiovascular: Denies chest pain or palpitations.  Respiratory: Denies shortness of breath, cough, and wheezing.  Gastrointestinal/Hepatic: Denies abdominal pain, nausea, vomiting, diarrhea, constipation or GI bleeding   Genitourinary: Denies dysuria or frequency  Musculoskeletal/Rheum: Denies  joint pain and swelling, no edema  Skin: Denies rash  Neurological: Denies headache, confusion, memory loss or focal weakness/parasthesias  Psychiatric: denies mood disorder   Endocrine: Yamini thyroid problems  Heme/Oncology/Lymph Nodes: Denies enlarged lymph nodes, denies brusing or known bleeding disorder  All other systems were reviewed and are negative (AMA/CMS criteria)                Past Medical History:   Past Medical History:   Diagnosis Date   • Adverse effect of anesthesia     in 2008 \"throat closes up\"\"anxiety\" ?laryngospasm, kept in ICU. Pt states no problems currently 2018.    • Anesthesia     in 2008 \"throat closes up\"\"anxiety\" ?laryngospasm, " "kept in ICU. Pt states no problems currently 2018.    • Arthritis     right shoulder, hands   • Cigarette smoker quit 2013   • Dental disorder     missing teeth    • depression 8/30/2016    denies depression, states has anxiety and panic attacks   • Diabetes mellitus type 1 (HCC) 1989    insulin   • Encounter for long-term (current) use of insulin (HCC) 9/25/2013   • Heart burn    • High cholesterol    • Hypertension    • Hypothyroidism, postsurgical 1970   • Indigestion    • Infectious disease      had hepatitis C, tested neg.   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.63) 6/10/2015         Past Surgical History:  Past Surgical History:   Procedure Laterality Date   • CATH PLACEMENT  1/25/2020    Procedure: INSERTION, CATHETER PERM;  Surgeon: Rola Mendoza M.D.;  Location: [facility];  Service: General   • IRRIGATION & DEBRIDEMENT NEURO  1/19/2020    Procedure: IRRIGATION AND DEBRIDEMENT, WOUND THORACIC AND LUMBAR;  Surgeon: Ryan Roman M.D.;  Location: [facility];  Service: Neurosurgery   • PB IMPLANT NEUROSTIM/ N/A 12/16/2019    Procedure: EXPLORATION AT THORACIC 8 - 9, REPLACEMENT OF  NEUROSTIMULATOR LEAD;  Surgeon: Ryan Roman M.D.;  Location: [facility];  Service: Neurosurgery   • SPINAL CORD STIMULATOR N/A 10/26/2018    Procedure: SPINAL CORD STIMULATOR;  Surgeon: Ryan Roman M.D.;  Location: [facility];  Service: Neurosurgery   • THORACIC LAMINECTOMY N/A 10/26/2018    Procedure: THORACIC LAMINECTOMY - FOR;  Surgeon: Ryan Roman M.D.;  Location: [facility];  Service: Neurosurgery   • OH NJX AA&/STRD TFRML EPI LUMBAR/SACRAL 1 LEVEL Right 8/31/2016    Procedure: INJ-FORAMEN EPI LUM/SAC SNGL " L4-5;  Surgeon: Sukhi Godfrey M.D.;  Location: SURGERY [facility];  Service: Pain Management   • LUMBAR LAMINECTOMY DISKECTOMY Right 5/10/2016    Procedure: LUMBAR L4-5 HEMILAMINECTOMY DISKECTOMY ;  Surgeon: Arnold Keyes M.D.;  Location: SURGERY [facility];  Service:    • CERVICAL FUSION POSTERIOR  1/16/2009    Performed by TARA CONTRERAS at [facility]   • HARDWARE REMOVAL NEURO  1/16/2009    Performed by TARA CONTRERAS at [facility]   • NECK EXPLORATION  1/16/2009    Performed by TARA CONTRERAS at [facility]   • SHOULDER ARTHROSCOPY W/ ROTATOR CUFF REPAIR  10/9/08    Performed by SHERLY CASTANEDA at [facility]   • SHOULDER DECOMPRESSION ARTHROSCOPIC  6/17/08    Performed by SHERLY CASTANEDA at [facility]   • CLAVICLE DISTAL EXCISION  6/17/08    Performed by SHERLY CASTANEDA at [facility]   • CERVICAL DISK AND FUSION ANTERIOR  03/12/08   • HYSTERECTOMY, VAGINAL  2006   • APPENDECTOMY  2004   • THYROID LOBECTOMY  1973   • TONSILLECTOMY  1963   • ABDOMINAL HYSTERECTOMY TOTAL     • LUMPECTOMY  1976, 2005    Breast    • LUMPECTOMY         Hospital Medications:    Current Facility-Administered Medications:   •  insulin regular (HumuLIN R,NovoLIN R) injection, 5 Units, Subcutaneous, Once, Michael Rico M.D.  •  insulin glargine (Semglee) injection, 15 Units, Subcutaneous, Q EVENING, Michael Rico M.D.  •  omeprazole (PRILOSEC) capsule 40 mg, 40 mg, Oral, DAILY, Michael Rico M.D., 40 mg at 09/28/21 0556  •  nicotine (NICODERM) 14 MG/24HR 14 mg, 1 Patch, Transdermal, QDAY PRN, Michael Rico M.D.  •  vitamin D3 (cholecalciferol) tablet 1,000 Units, 1,000 Units, Oral, DAILY, Michael Rico M.D., 1,000 Units at 09/28/21 0557  •  multivitamin (THERAGRAN) tablet 1 Tablet, 1 Tablet, Oral, DAILY, Michael Rico M.D., 1 Tablet at 09/28/21 0556  •  polyethylene glycol/lytes (MIRALAX) PACKET 1 Packet, 1 Packet, Oral, QDAY PRN **AND**  magnesium hydroxide (MILK OF MAGNESIA) suspension 30 mL, 30 mL, Oral, QDAY PRN **AND** bisacodyl (DULCOLAX) suppository 10 mg, 10 mg, Rectal, QDAY PRN, Francesca Cummings M.D.  •  Notify provider if pain remains uncontrolled, , , CONTINUOUS **AND** Use the Numeric Rating Scale (NRS), [place]renas-Baker Faces (WBF), or FLACC on regular floors and Critical-Care Pain Observation Tool (CPOT) on ICUs/Trauma to assess pain, , , CONTINUOUS **AND** Pulse Ox, , , CONTINUOUS **AND** Pharmacy Consult Request ...Pain Management Review 1 Each, 1 Each, Other, PHARMACY TO DOSE **AND** If patient difficult to arouse and/or has respiratory depression (respiratory rate of 10 or less), stop any opiates that are currently infusing and call a Rapid Response., , , CONTINUOUS, Francesca Cummings M.D.  •  oxyCODONE immediate-release (ROXICODONE) tablet 5 mg, 5 mg, Oral, Q3HRS PRN **OR** oxyCODONE immediate release (ROXICODONE) tablet 10 mg, 10 mg, Oral, Q3HRS PRN **OR** HYDROmorphone (Dilaudid) injection 0.5 mg, 0.5 mg, Intravenous, Q3HRS PRN, Francesca Cummings M.D.  •  cloNIDine (CATAPRES) tablet 0.1 mg, 0.1 mg, Oral, Q6HRS PRN, Francesca Cummings M.D.  •  enalaprilat (Vasotec) injection 1.25 mg 1 mL, 1.25 mg, Intravenous, Q6HRS PRN, Francesca Cummings M.D.  •  labetalol (NORMODYNE/TRANDATE) injection 10 mg, 10 mg, Intravenous, Q4HRS PRN, Francesca Cummings M.D.  •  ondansetron (ZOFRAN) syringe/vial injection 4 mg, 4 mg, Intravenous, Q4HRS PRN, Francesca Cummings M.D.  •  ondansetron (ZOFRAN ODT) dispertab 4 mg, 4 mg, Oral, Q4HRS PRN, Francesca Cummings M.D.  •  promethazine (PHENERGAN) tablet 12.5-25 mg, 12.5-25 mg, Oral, Q4HRS PRN, Francesca Cummings M.D.  •  promethazine (PHENERGAN) suppository 12.5-25 mg, 12.5-25 mg, Rectal, Q4HRS PRN, Francesca Cummings M.D.  •  prochlorperazine (COMPAZINE) injection 5-10 mg, 5-10 mg, Intravenous, Q4HRS PRN, Francesca Cummings M.D.  •  insulin regular (HumuLIN R,NovoLIN R) injection, 1-6 Units, Subcutaneous, Q6HRS **AND** POC blood glucose  manual result, , , Q6H **AND** NOTIFY MD and PharmD, , , Once **AND** glucose 4 g chewable tablet 16 g, 16 g, Oral, Q15 MIN PRN **AND** dextrose 50% (D50W) injection 50 mL, 50 mL, Intravenous, Q15 MIN PRN, Francesca Cummings M.D.  •  amLODIPine (NORVASC) tablet 5 mg, 5 mg, Oral, QHS, Francesca Cummings M.D.  •  sertraline (Zoloft) tablet 100 mg, 100 mg, Oral, DAILY, Francesca Cummings M.D.  •  [START ON 9/29/2021] metoprolol (TOPROL-XL) TB24 200 mg, 200 mg, Oral, DAILY, Francesca Cummings M.D.  •  [START ON 9/29/2021] levothyroxine (SYNTHROID) tablet 200 mcg, 200 mcg, Oral, QDAY, Francesca Cummings M.D.  •  [START ON 9/29/2021] levothyroxine (SYNTHROID) tablet 25 mcg, 25 mcg, Oral, AM ES, Francesca Cummings M.D.    Current Outpatient Medications:   •  TRESIBA FLEXTOUCH 100 UNIT/ML Solution Pen-injector, , Disp: , Rfl:   •  clopidogrel (PLAVIX) 75 MG Tab, Take 75 mg by mouth every day., Disp: , Rfl:   •  ALPRAZolam (XANAX) 0.5 MG Tab, Take 0.5 mg by mouth., Disp: , Rfl:   •  sertraline (ZOLOFT) 100 MG Tab, Take 100 mg by mouth every day., Disp: , Rfl:   •  amLODIPine (NORVASC) 10 MG Tab, Take 5 mg by mouth. N THE EVENING, Disp: , Rfl:   •  SYNTHROID 200 MCG Tab, Take 200 mcg by mouth every day., Disp: , Rfl:   •  SYNTHROID 25 MCG Tab, , Disp: , Rfl:   •  metoprolol (TOPROL-XL) 200 MG XL tablet, Take 1 tablet by mouth every day., Disp: 90 tablet, Rfl: 3  •  Continuous Blood Gluc Sensor (FREESTYLE PARISA 14 DAY SENSOR) Misc, 1 MISCELLANEOUS E10.42 CHANGE SENSOR EVERY 14 DAYS   E11.65, Disp: , Rfl:   •  aspirin 81 MG EC tablet, Take 1 Tab by mouth every morning., Disp: 90 Tab, Rfl: 3  •  insulin aspart (NOVOLOG) 100 UNIT/ML Solution, Inject 1-5 Units under the skin 3 times a day before meals. 1 units for every 5 g of carbs  AND Sliding Scale  150 - 200 = 1 units 201 - 250 = 2 units 251 - 300 = 3 units 301 - 350 = 4 units 351 - 400 = 5 units, Disp: , Rfl:     Current Outpatient Medications:  Current Facility-Administered Medications    Medication Dose Route Frequency Provider Last Rate Last Admin   • insulin regular (HumuLIN R,NovoLIN R) injection  5 Units Subcutaneous Once Michael Rico M.D.       • insulin glargine (Semglee) injection  15 Units Subcutaneous Q EVENING Michael Rico M.D.       • omeprazole (PRILOSEC) capsule 40 mg  40 mg Oral DAILY Michael Rico M.D.   40 mg at 09/28/21 0556   • nicotine (NICODERM) 14 MG/24HR 14 mg  1 Patch Transdermal QDAY PRN Michael Rico M.D.       • vitamin D3 (cholecalciferol) tablet 1,000 Units  1,000 Units Oral DAILY Michael Rico M.D.   1,000 Units at 09/28/21 0557   • multivitamin (THERAGRAN) tablet 1 Tablet  1 Tablet Oral DAILY Michael Rico M.D.   1 Tablet at 09/28/21 0556   • polyethylene glycol/lytes (MIRALAX) PACKET 1 Packet  1 Packet Oral QDAY PRN Francesca Cummings M.D.        And   • magnesium hydroxide (MILK OF MAGNESIA) suspension 30 mL  30 mL Oral QDAY PRN Francesca Cummings M.D.        And   • bisacodyl (DULCOLAX) suppository 10 mg  10 mg Rectal QDAY PRN Francesca Cummings M.D.       • Pharmacy Consult Request ...Pain Management Review 1 Each  1 Each Other PHARMACY TO DOSE Francesca Cummings M.D.       • oxyCODONE immediate-release (ROXICODONE) tablet 5 mg  5 mg Oral Q3HRS PRN Francesca Cummings M.D.        Or   • oxyCODONE immediate release (ROXICODONE) tablet 10 mg  10 mg Oral Q3HRS PRN Francesca Cummings M.D.        Or   • HYDROmorphone (Dilaudid) injection 0.5 mg  0.5 mg Intravenous Q3HRS PRN Francesca Cummings M.D.       • cloNIDine (CATAPRES) tablet 0.1 mg  0.1 mg Oral Q6HRS PRN Francesca Cummings M.D.       • enalaprilat (Vasotec) injection 1.25 mg 1 mL  1.25 mg Intravenous Q6HRS PRN Francesca Cummings M.D.       • labetalol (NORMODYNE/TRANDATE) injection 10 mg  10 mg Intravenous Q4HRS PRN Francesca Cummings M.D.       • ondansetron (ZOFRAN) syringe/vial injection 4 mg  4 mg Intravenous Q4HRS PRN Francesca Cummings M.D.       • ondansetron (ZOFRAN ODT) dispertab 4 mg  4 mg Oral Q4HRS PRN Francesca Cummings M.D.        • promethazine (PHENERGAN) tablet 12.5-25 mg  12.5-25 mg Oral Q4HRS PRN Francesca Cummings M.D.       • promethazine (PHENERGAN) suppository 12.5-25 mg  12.5-25 mg Rectal Q4HRS PRN Francesca Cummings M.D.       • prochlorperazine (COMPAZINE) injection 5-10 mg  5-10 mg Intravenous Q4HRS PRN Francesca Cummings M.D.       • insulin regular (HumuLIN R,NovoLIN R) injection  1-6 Units Subcutaneous Q6HRS Francesca Cummings M.D.        And   • glucose 4 g chewable tablet 16 g  16 g Oral Q15 MIN PRN Francesca Cummings M.D.        And   • dextrose 50% (D50W) injection 50 mL  50 mL Intravenous Q15 MIN PRN Francesca Cummings M.D.       • amLODIPine (NORVASC) tablet 5 mg  5 mg Oral QHS Francesca Cummings M.D.       • sertraline (Zoloft) tablet 100 mg  100 mg Oral DAILY Francesca Cummings M.D.       • [START ON 9/29/2021] metoprolol (TOPROL-XL) TB24 200 mg  200 mg Oral DAILY Francesca Cummings M.D.       • [START ON 9/29/2021] levothyroxine (SYNTHROID) tablet 200 mcg  200 mcg Oral QDAY Francesca Cummings M.D.       • [START ON 9/29/2021] levothyroxine (SYNTHROID) tablet 25 mcg  25 mcg Oral AM ES Francesca Cummings M.D.         Current Outpatient Medications   Medication Sig Dispense Refill   • TRESIBA FLEXTOUCH 100 UNIT/ML Solution Pen-injector      • clopidogrel (PLAVIX) 75 MG Tab Take 75 mg by mouth every day.     • ALPRAZolam (XANAX) 0.5 MG Tab Take 0.5 mg by mouth.     • sertraline (ZOLOFT) 100 MG Tab Take 100 mg by mouth every day.     • amLODIPine (NORVASC) 10 MG Tab Take 5 mg by mouth. N THE EVENING     • SYNTHROID 200 MCG Tab Take 200 mcg by mouth every day.     • SYNTHROID 25 MCG Tab      • metoprolol (TOPROL-XL) 200 MG XL tablet Take 1 tablet by mouth every day. 90 tablet 3   • Continuous Blood Gluc Sensor (RentoboSTYLE PARISA 14 DAY SENSOR) Misc 1 MISCELLANEOUS E10.42 CHANGE SENSOR EVERY 14 DAYS   E11.65     • aspirin 81 MG EC tablet Take 1 Tab by mouth every morning. 90 Tab 3   • insulin aspart (NOVOLOG) 100 UNIT/ML Solution Inject 1-5 Units under  "the skin 3 times a day before meals. 1 units for every 5 g of carbs   AND  Sliding Scale   150 - 200 = 1 units  201 - 250 = 2 units  251 - 300 = 3 units  301 - 350 = 4 units  351 - 400 = 5 units         Medication Allergy:  Allergies   Allergen Reactions   • Tape      Blisters, paper tape is ok       Family History:  Family History   Problem Relation Age of Onset   • Hypertension Mother    • Cancer Father        Social History:  Social History     Socioeconomic History   • Marital status:      Spouse name: Not on file   • Number of children: Not on file   • Years of education: Not on file   • Highest education level: Not on file   Occupational History   • Not on file   Tobacco Use   • Smoking status: Current Every Day Smoker     Packs/day: 1.00     Years: 30.00     Pack years: 30.00     Types: Cigarettes   • Smokeless tobacco: Never Used   • Tobacco comment: 1 ppd    Vaping Use   • Vaping Use: Never used   Substance and Sexual Activity   • Alcohol use: Yes     Comment:  \"maybe once a month I'll have some wine\".    • Drug use: No   • Sexual activity: Not on file   Other Topics Concern   • Not on file   Social History Narrative   • Not on file     Social Determinants of Health     Financial Resource Strain:    • Difficulty of Paying Living Expenses:    Food Insecurity:    • Worried About Running Out of Food in the Last Year:    • Ran Out of Food in the Last Year:    Transportation Needs:    • Lack of Transportation (Medical):    • Lack of Transportation (Non-Medical):    Physical Activity:    • Days of Exercise per Week:    • Minutes of Exercise per Session:    Stress:    • Feeling of Stress :    Social Connections:    • Frequency of Communication with Friends and Family:    • Frequency of Social Gatherings with Friends and Family:    • Attends [employer] Services:    • Active Member of Clubs or Organizations:    • Attends Club or Organization Meetings:    • Marital Status:    Intimate Partner Violence:    • " "Fear of Current or Ex-Partner:    • Emotionally Abused:    • Physically Abused:    • Sexually Abused:          Physical Exam:  Vitals/ General Appearance:   Weight/BMI: Body mass index is 22.17 kg/m².  /61   Pulse 73   Temp 36.3 °C (97.3 °F) (Temporal)   Resp 16   Ht 1.676 m (5' 6\")   Wt 62.3 kg (137 lb 5.6 oz)   SpO2 93%   Vitals:    09/28/21 0458 09/28/21 0500 09/28/21 0530 09/28/21 0658   BP: 121/60   127/61   Pulse: 74 73 72 73   Resp:       Temp:       TempSrc:       SpO2: 93% 94% 93% 93%   Weight:       Height:         Oxygen Therapy:  Pulse Oximetry: 93 %, O2 Delivery Device: None - Room Air    Constitutional:   Well developed, Well nourished, moaning in bed but then stops on her own  HENMT:  Normocephalic, Atraumatic, Oropharynx moist mucous membranes, No oral exudates, Nose normal.  No thyromegaly.  Eyes: Scleral icterus is present.  Neck:  Normal range of motion, No cervical tenderness,  no JVD.  Cardiovascular:  Normal heart rate, Normal rhythm, No murmurs, No rubs, No gallops.   Extremitites with intact distal pulses, no cyanosis, or edema.  Lungs:  Normal breath sounds, breath sounds clear to auscultation bilaterally,  no crackles, no wheezing.   Abdomen: Bowel sounds normal, Soft, No tenderness, No guarding, No rebound, No masses, No hepatosplenomegaly.  Skin: Warm, Dry, No erythema, No rash, no induration.  Neurologic: Alert & oriented x 3, No focal deficits noted, cranial nerves II through X are grossly intact.  Psychiatric: Judgment is marred by impaired insight      MDM (Data Review):     Records reviewed and summarized in current documentation    Lab Data Review:  Recent Results (from the past 24 hour(s))   URINALYSIS    Collection Time: 09/28/21 12:05 AM   Result Value Ref Range    Color Yellow     Character Hazy (A)     Specific Gravity 1.010 <1.035    Ph 6.0 5.0 - 8.0    Glucose >=1000 (A) Negative mg/dL    Ketones Negative Negative mg/dL    Protein Negative Negative mg/dL    " Bilirubin Large (A) Negative    Nitrite Negative Negative    Leukocyte Esterase Negative Negative    Occult Blood Trace (A) Negative    Micro Urine Req Microscopic    URINE MICROSCOPIC (W/UA)    Collection Time: 09/28/21 12:05 AM   Result Value Ref Range    WBC 2-5 /hpf    RBC 2-5 (A) /hpf    Bacteria Few (A) None /hpf    Epithelial Cells Few Few /hpf    Mucous Threads Few /hpf   CBC WITH DIFFERENTIAL    Collection Time: 09/28/21 12:12 AM   Result Value Ref Range    WBC 9.6 4.8 - 10.8 K/uL    RBC 2.71 (L) 4.20 - 5.40 M/uL    Hemoglobin 8.6 (L) 12.0 - 16.0 g/dL    Hematocrit 27.5 (L) 37.0 - 47.0 %    .5 (H) 81.4 - 97.8 fL    MCH 31.7 27.0 - 33.0 pg    MCHC 31.3 (L) 33.6 - 35.0 g/dL    RDW 50.4 (H) 35.9 - 50.0 fL    Platelet Count 342 164 - 446 K/uL    MPV 10.9 9.0 - 12.9 fL    Neutrophils-Polys 70.10 44.00 - 72.00 %    Lymphocytes 13.40 (L) 22.00 - 41.00 %    Monocytes 12.00 0.00 - 13.40 %    Eosinophils 2.90 0.00 - 6.90 %    Basophils 1.00 0.00 - 1.80 %    Immature Granulocytes 0.60 0.00 - 0.90 %    Nucleated RBC 0.00 /100 WBC    Neutrophils (Absolute) 6.71 2.00 - 7.15 K/uL    Lymphs (Absolute) 1.28 1.00 - 4.80 K/uL    Monos (Absolute) 1.15 (H) 0.00 - 0.85 K/uL    Eos (Absolute) 0.28 0.00 - 0.51 K/uL    Baso (Absolute) 0.10 0.00 - 0.12 K/uL    Immature Granulocytes (abs) 0.06 0.00 - 0.11 K/uL    NRBC (Absolute) 0.00 K/uL   COMP METABOLIC PANEL    Collection Time: 09/28/21 12:12 AM   Result Value Ref Range    Sodium 132 (L) 135 - 145 mmol/L    Potassium 3.8 3.6 - 5.5 mmol/L    Chloride 103 96 - 112 mmol/L    Co2 20 20 - 33 mmol/L    Anion Gap 9.0 7.0 - 16.0    Glucose 411 (H) 65 - 99 mg/dL    Bun 12 8 - 22 mg/dL    Creatinine 0.62 0.50 - 1.40 mg/dL    Calcium 8.2 (L) 8.4 - 10.2 mg/dL    AST(SGOT) 175 (H) 12 - 45 U/L    ALT(SGPT) 172 (H) 2 - 50 U/L    Alkaline Phosphatase 2183 (H) 30 - 99 U/L    Total Bilirubin 11.2 (H) 0.1 - 1.5 mg/dL    Albumin 2.9 (L) 3.2 - 4.9 g/dL    Total Protein 6.1 6.0 - 8.2 g/dL     Globulin 3.2 1.9 - 3.5 g/dL    A-G Ratio 0.9 g/dL   LIPASE    Collection Time: 09/28/21 12:12 AM   Result Value Ref Range    Lipase 23 7 - 58 U/L   ESTIMATED GFR    Collection Time: 09/28/21 12:12 AM   Result Value Ref Range    GFR If African American >60 >60 mL/min/1.73 m 2    GFR If Non African American >60 >60 mL/min/1.73 m 2   VENOUS BLOOD GAS    Collection Time: 09/28/21 12:39 AM   Result Value Ref Range    Venous Bg Ph 7.33 7.31 - 7.45    Venous Bg Pco2 38.2 (L) 41.0 - 51.0 mmHg    Venous Bg Po2 30.9 25.0 - 40.0 mmHg    Venous Bg O2 Saturation 55.0 %    Venous Bg Hco3 20 (L) 24 - 28 mmol/L    Venous Bg Base Excess -6 mmol/L    Body Temp see below Centigrade   Prothrombin Time    Collection Time: 09/28/21 12:51 AM   Result Value Ref Range    PT 12.8 12.0 - 14.6 sec    INR 1.04 0.87 - 1.13   AMMONIA    Collection Time: 09/28/21 12:51 AM   Result Value Ref Range    Ammonia 40 11 - 45 umol/L   COV-2, FLU A/B, AND RSV BY PCR (2-4 HOURS CEPHEID): Collect NP swab in VTM    Collection Time: 09/28/21  1:56 AM    Specimen: Nasopharyngeal; Respirate   Result Value Ref Range    Influenza virus A RNA Negative Negative    Influenza virus B, PCR Negative Negative    RSV, PCR Negative Negative    SARS-CoV-2 by PCR NotDetected     SARS-CoV-2 Source NP Swab    POCT glucose device results    Collection Time: 09/28/21  5:52 AM   Result Value Ref Range    Glucose - Accu-Ck 250 (H) 65 - 99 mg/dL       Imaging/Procedures Review:    MRCP had no focal findings      MDM (Assessment and Plan):     Patient Active Problem List    Diagnosis Date Noted   • Elevated liver enzymes 09/23/2021   • Macrocytic anemia 09/23/2021   • Hyponatremia 09/23/2021   • Elevated troponin 09/23/2021   • Pain in the chest 08/17/2020   • CAD S/P percutaneous coronary angioplasty 08/11/2020   • Hypothyroidism in adult 08/10/2020   • CKD (chronic kidney disease) stage 3, GFR 30-59 ml/min ([facility]) 06/23/2020   • Dyslipidemia 06/23/2020   • GERD (gastroesophageal  reflux disease) 06/23/2020   • Lung nodule 06/23/2020   • Hemoptysis 06/23/2020   • Anxiety 01/25/2020   • Nausea & vomiting 01/25/2020   • Hypertension 01/24/2020   • Hypoglycemia 01/22/2020   • RHEA (acute kidney injury) (HCC) 01/21/2020   • Post-operative wound abscess 01/18/2020   • Tobacco abuse 01/18/2020   • Cellulitis 03/15/2018   • Left hip pain 03/15/2018   • Acute left lumbar radiculopathy 08/31/2016   • Lumbar spinal stenosis 05/10/2016   • Type 1 diabetes mellitus with neurological manifestations, uncontrolled (HCC) 06/10/2015   • Diabetic polyneuropathy (CMS-HCC) 06/10/2015   • Vertigo 04/08/2015   • Uncontrolled type 1 diabetes mellitus (HCC) 09/25/2013   • Encounter for long-term (current) use of insulin (HCC) 09/25/2013   • Accidental drug overdose 08/27/2012   • Vitamin d deficiency 03/14/2012   • Hypothyroidism, postsurgical    • Type 1 diabetes mellitus with kidney complication (HCC)    • Cigarette smoker      #Jaundice  #Cholestatic liver injury.  She had negative MRCP.  Elevated CA 19-9 is nonspecific and that can be high in any cause of biliary obstruction.  Overall her enzymes are downtrending albeit slowly.  Bilirubin notoriously lacks improvement in the liver enzymes.  Parts of her liver function are otherwise intact such as her INR.  Differential diagnosis at this stage encompasses primary sclerosing cholangitis potentially small duct given the normal MRCP; liver biopsy can conceivably capture this but however since liver biopsy is a focal sample it can miss afflicted areas.  Can consider cholangiocarcinoma although MRCP is quite sensitive for this.    -Agree with proceeding with liver biopsy.  -Follow-up previously outstanding hepatitis C antibodies Tomas-Barr virus titers cytomegalovirus antibodies herpes simplex antibodies VZV antibodies which are not readily apparent in the chart review tab  -Follow-up liver soluble antigen and free texted miscellaneous test anti-LK M1  antibodies  -Follow-up IgG4    #Epigastric pain: Appears to be separate from her liver injury given the timing of symptoms.    -Recommend PPI twice a day    #Elevated CEA: Also nonspecific.  Can be elevated in tobacco users      #Chronic tobacco use     cessation    #Euvolemic hyponatremia: Appears euvolemic on exam    -Trial 2 g sodium diet  -Obtain urine sodium and creatinine    Thank your for the opportunity to assist in the care of your patient.  Please call for any questions or concerns.    Nikunj Lenz M.D.

## 2021-09-28 NOTE — ASSESSMENT & PLAN NOTE
- Chronic from last hospitalization, appears stable  - Likely secondary to liver dysfunction  - Check urine lytes

## 2021-09-28 NOTE — ASSESSMENT & PLAN NOTE
- Pt relates that she takes 26-30u Tresiba at night and SSI  -Continue with 25u qhs tonight with SSI here  - A1c last week was 8.2

## 2021-09-28 NOTE — ED NOTES
"MD at  for evaluation   Pt upset with the care she rec'eved at [facility]  Distressed that \"No one can take care of me here due to my liver failure\"  MD patiently speaking with pt and her daughter Farzaneh on phone    " Clinic Care Coordination Contact  Care Team Conversations    OCC RN met pt during the face to face initial visit with  Dr García.  She was accompanied by a  staff member and [facility].  Prior to this visit, on 8-10-18, this nurse contacted [facility] - [phone]-Infusion to obtain the pt's Oncology treatment plan/Infusion schedule.  Also determined in this phone visit was how many treatment cycles are remaining; were informed she had 3 cycles remaining.  The pt is scheduled to have radiation therapy following her 12 cycles.  She will receive Cycle 10 today following Dr García approval.  Dr García was given Dr Parth Vaughan, [place] Oncologist number ([phone]) to contact him directly with questions.  Received by fax was her treatment plan, Dr Vaughan's most recent progress note (7/26/18).      During the visit, the pt stated she will be returning to [place], where she currently resides once discharged from the [facility] unit - she stated she did not know when this would take place.  She was given information pertaining to her Chemo treatment; pt verified she would be receiving Cycle 10 today.  She stated she had a large fluid filled area beneath the skin from the mastectomy.  Dr García assessed this area.  The area was without redness, soft, pt denied pain, no open skin and swelling per pt, as visualized by this nurse.  This nurse did not palpate the area.  The visit ended with the pt approved to leave the Medical Oncology area, enroute to the [facility].  Pt walked without assistance to the visitor wheelchair and with the escorts as identified above.      Roseann Campos RN  [facility] Care Coordinator

## 2021-09-28 NOTE — ASSESSMENT & PLAN NOTE
- Iron indices normal last hospitalization  - Denies hematochezia or melena but did have colitis on CT last hospitalization  - B12 ~300 in August, will recheck, supplement orally   - Check occult stool

## 2021-09-28 NOTE — ASSESSMENT & PLAN NOTE
- Pt's reason for appearing back in our ER is more epigastric pain than RUQ pain  - Hgb is lower than previous discharge  - continue with PPI  - Denies melena or hematochezia  - CT last hospitalization with pancolitis, reports of pt previously being on Lialda for IBD

## 2021-09-28 NOTE — OR SURGEON
Immediate Post- Operative Note        Findings: liver disease      Procedure(s): CT liver biopsy      Estimated Blood Loss: Less than 5 ml        Complications: None            9/28/2021     4:14 PM     Sushil Thakur M.D.

## 2021-09-28 NOTE — H&P
Hospital Medicine History & Physical Note    Date of Service  9/28/2021    Primary Care Physician  Jarrell Yates M.D.    Consultants  GI    Specialist Names: Dr Lenz     Code Status  Full Code    Chief Complaint  Chief Complaint   Patient presents with   • Abdominal Pain     Patient BIB [facility] from home with upper abd pain. Patient is newly Dx liver failure, went to [place] today to see specialist but they gave away her bed so she returned home.    • Hyperglycemia     DM type 1, BG by  after 1.5L IV NS.       History of Presenting Illness  Anu Manuel is a 66 y.o. female who presented 9/27/2021 with abdominal pain. She has a history of DM I, CAD (MINDY x 2 in August of 2020),  HTN and hypothyroidism. She was admitted to [facility] on 9/23 with new onset hepatitis, and was seen by Dr Zavala during that hospitalization - he recommended transfer to [facility] for Hepatology at that time.  [facility] transport was 1-3 days out, so pt decided to have her family transport her; however, she did not arrive at [facility] at her slotted time, and they gave away her bed. She was then admitted via the ER at [facility] but left AMA as she felt that she did not like the way they were treating her and she missed her dog.     She returned here with epigastric abdominal pain; during last admission she had diarrhea with question of IBD on imaging, but she states she has not had diarrhea since discharge on the 26th from here, and no melena or hematochezia. She denies EtOH or tylenol ingestion. The ER doc attempted to transfer pt back to [facility] for Hepatology again, but they stated they would not accept her without a liver biopsy from our facility. Workup at [facility] included an elevated GGT, , mildly elevated CEA.    I discussed the plan of care with patient and ERP.    Review of Systems  Review of Systems   Constitutional: Negative for chills and fever.   Respiratory: Negative for cough and shortness  of breath.    Cardiovascular: Negative for chest pain and leg swelling.   Gastrointestinal: Positive for abdominal pain, nausea and vomiting. Negative for blood in stool, constipation, diarrhea, heartburn and melena.   Genitourinary: Negative for dysuria.   Musculoskeletal: Negative for myalgias.   All other systems reviewed and are negative.      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    Surgical History   has a past surgical history that includes hysterectomy, vaginal (2006); thyroid lobectomy (1973); lumpectomy (1976, 2005); cervical disk and fusion anterior (03/12/08); tonsillectomy (1963); cervical fusion posterior (1/16/2009); hardware removal neuro (1/16/2009); neck exploration (1/16/2009); abdominal hysterectomy total; lumpectomy; lumbar laminectomy diskectomy (Right, 5/10/2016); shoulder decompression arthroscopic (6/17/08); clavicle distal excision (6/17/08); shoulder arthroscopy w/ rotator cuff repair (10/9/08); pr njx aa&/strd tfrml epi lumbar/sacral 1 level (Right, 8/31/2016); spinal cord stimulator (N/A, 10/26/2018); thoracic laminectomy (N/A, 10/26/2018); appendectomy (2004); pr implant neurostim/ (N/A, 12/16/2019); irrigation & debridement neuro (1/19/2020); and cath placement (1/25/2020).     Family History  family history includes Cancer in her father; Hypertension in her mother.   Family history reviewed with patient. There is no  family history that is pertinent to the chief complaint.     Social History   reports that she has been smoking cigarettes. She has a 30.00 pack-year smoking history. She has never used smokeless tobacco. She reports current alcohol use. She reports that she does not use drugs.    Allergies  Allergies   Allergen Reactions   • Tape      Blisters, paper tape is ok       Medications  Prior to Admission Medications   Prescriptions Last Dose Informant Patient Reported? Taking?   ALPRAZolam (XANAX) 0.5 MG Tab   Yes No   Sig: Take 0.5 mg by mouth.   Continuous Blood Gluc Sensor (FREESTYLE PARISA 14 DAY SENSOR) [facility]  Patient Yes No   Si MISCELLANEOUS E10.42 CHANGE SENSOR EVERY 14 DAYS   E11.65   SYNTHROID 200 MCG Tab   Yes No   Sig: Take 200 mcg by mouth every day.   SYNTHROID 25 MCG Tab   Yes No   TRESIBA FLEXTOUCH 100 UNIT/ML Solution Pen-injector   Yes No   amLODIPine (NORVASC) 10 MG Tab   Yes No   Sig: Take 5 mg by mouth. N THE EVENING   aspirin 81 MG EC tablet  Patient No No   Sig: Take 1 Tab by mouth every morning.   clopidogrel (PLAVIX) 75 MG Tab   Yes No   Sig: Take 75 mg by mouth every day.   insulin aspart (NOVOLOG) 100 UNIT/ML Solution  Patient Yes No   Sig: Inject 1-5 Units under the skin 3 times a day before meals. 1 units for every 5 g of carbs   AND  Sliding Scale   150 - 200 = 1 units  201 - 250 = 2 units  251 - 300 = 3 units  301 - 350 = 4 units  351 - 400 = 5 units   metoprolol (TOPROL-XL) 200 MG XL tablet   No No   Sig: Take 1 tablet by mouth every day.   sertraline (ZOLOFT) 100 MG Tab   Yes No   Sig: Take 100 mg by mouth every day.      Facility-Administered Medications: None       Physical Exam  Temp:  [36.3 °C (97.3 °F)] 36.3 °C (97.3 °F)  Pulse:  [70-89] 73  Resp:  [16] 16  BP: (121-166)/(60-78) 127/61  SpO2:  [93 %-97 %] 93 %  Blood Pressure: 127/61   Temperature: 36.3 °C (97.3 °F)   Pulse: 73   Respiration: 16   Pulse Oximetry: 93 %       Physical Exam  Vitals and nursing note reviewed.    Constitutional:       Comments: Disheveled    HENT:      Head: Normocephalic and atraumatic.      Mouth/Throat:      Mouth: Mucous membranes are moist.   Cardiovascular:      Rate and Rhythm: Normal rate and regular rhythm.      Heart sounds: No murmur heard.     Pulmonary:      Effort: Pulmonary effort is normal. No respiratory distress.      Breath sounds: Normal breath sounds. No wheezing, rhonchi or rales.   Abdominal:      General: Abdomen is flat. Bowel sounds are normal. There is no distension.      Palpations: Abdomen is soft.      Tenderness: There is abdominal tenderness (Mild, epigastric). There is no guarding or rebound.   Skin:     General: Skin is warm and dry.      Coloration: Skin is jaundiced.   Neurological:      General: No focal deficit present.      Mental Status: She is alert and oriented to person, place, and time.   Psychiatric:         Mood and Affect: Mood normal.         Behavior: Behavior normal.         Laboratory:  Recent Labs     09/26/21  0125 09/28/21  0012   WBC 10.4 9.6   RBC 3.10* 2.71*   HEMOGLOBIN 9.7* 8.6*   HEMATOCRIT 30.8* 27.5*   MCV 99.4* 101.5*   MCH 31.3 31.7   MCHC 31.5* 31.3*   RDW 48.1 50.4*   PLATELETCT 316 342   MPV 11.4 10.9     Recent Labs     09/26/21  0125 09/28/21  0012   SODIUM 131* 132*   POTASSIUM 3.5* 3.8   CHLORIDE 104 103   CO2 18* 20   GLUCOSE 179* 411*   BUN 18 12   CREATININE 0.64 0.62   CALCIUM 8.2* 8.2*     Recent Labs     09/26/21  0125 09/28/21  0012   ALTSGPT 184* 172*   ASTSGOT 202* 175*   ALKPHOSPHAT 2296* 2183*   TBILIRUBIN 8.7* 11.2*   LIPASE  --  23   GLUCOSE 179* 411*     Recent Labs     09/26/21  0125 09/28/21  0051   INR 0.99 1.04     No results for input(s): NTPROBNP in the last 72 hours.      No results for input(s): TROPONINT in the last 72 hours.    Imaging:  US-RUQ   Final Result      1.  Gallstones within the gallbladder. The gallbladder is contracted No evidence of biliary ductal dilatation      2.  The liver is echogenic  "consistent with fatty change versus hepatocellular dysfunction.          no X-Ray or EKG requiring interpretation    Assessment/Plan:  I anticipate this patient will require at least two midnights for appropriate medical management, necessitating inpatient admission.    * Elevated liver enzymes- (present on admission)  Assessment & Plan  - [facility] Hep[facility], but pt left AMA. ERP attempted to transfer her back to Critical access hospital but they declined transfer. Per ERP note - \"Case discussed with Dr. Michele, hepatologist at [facility]. He declines transfer. He recommends further local workup given high risk of recurrent AMA. He suggests obtaining liver biopsy and he will be able to provide remote guidance on care, including potentially reviewing pathology slides. Should the diagnosis be something untreatable at our facility, the patient can be transferred at that time. Dr. Michele will discuss the case with Dr. Zavala, the patient's local gastroenterologist\"  - Denies EtOH or Tylenol, will check serum alcohol and UDS given her AMA status at [facility] hospital   - While at Critical access hospital, had elevated GGT, CEA (mild elevated) and elevated Ca 19-9  - While here, had a normal ceruloplasmin, IgG, IgM, AFP, hepatitis panel, CMV, antimitochondrial antibodies, F-actin ab  - Had negative MRCP last week, liver u/s without evidence of thrombosis, and RUQ u/s in the ER this morning with hepatocellular dysfunction  - EBV panel may indicate reactivation of EBV/late acute infection, will discuss interpretation with GI   - SCARLETT positive last stay, will order reflexive panel  - Dr Lenz to see patient today, discussed with IR Dr Rico who will get back to me on timing of liver biopsy - keep pt NPO      Epigastric pain  Assessment & Plan  - Pt's reason for appearing back in our ER is more epigastric pain than RUQ pain  - Hgb is lower than previous discharge  - Will occult stools, start PPI  - Denies melena or " hematochezia  - CT last hospitalization with pancolitis, reports of pt previously being on Lialda for IBD     Hyponatremia- (present on admission)  Assessment & Plan  - Chronic from last hospitalization, appears stable  - Likely secondary to liver dysfunction  - Check urine lytes     Macrocytic anemia- (present on admission)  Assessment & Plan  - Iron indices normal last hospitalization  - Denies hematochezia or melena but did have colitis on CT last hospitalization  - B12 ~300 in August, will recheck, supplement orally   - Check occult stool    CAD S/P percutaneous coronary angioplasty- (present on admission)  Assessment & Plan  - MINDY placement in August of 2020  - Given anticipated liver biopsy, hold ASA and Plavix - did get ASA this am in the ED    Dyslipidemia- (present on admission)  Assessment & Plan  - No statin given her hepatic dysfunction    Anxiety- (present on admission)  Assessment & Plan  - Will give PRN Ativan given her liver dysfunction, uses Xanax at home    Hypertension- (present on admission)  Assessment & Plan  - Resume home Norvasc at Metoprolol - received 100mg Metoprolol in the ER, will start home dose of 200mg in the am     Tobacco abuse- (present on admission)  Assessment & Plan  - Nicotine patch ordered     Uncontrolled type 1 diabetes mellitus (HCC)- (present on admission)  Assessment & Plan  - Pt relates that she takes 26-30u Tresiba at night and SSI  - Will start 25u qhs tonight with SSI here  - A1c last week was 8.2    Hypothyroidism, postsurgical- (present on admission)  Assessment & Plan  - Resume home Synthroid  - Last TSH in July was normal, will recheck in the am         VTE prophylaxis: SCDs/TEDs

## 2021-09-29 LAB
ALBUMIN SERPL BCP-MCNC: 2.6 G/DL (ref 3.2–4.9)
ALBUMIN/GLOB SERPL: 0.8 G/DL
ALP SERPL-CCNC: 2130 U/L (ref 30–99)
ALT SERPL-CCNC: 156 U/L (ref 2–50)
ANION GAP SERPL CALC-SCNC: 8 MMOL/L (ref 7–16)
AST SERPL-CCNC: 165 U/L (ref 12–45)
BILIRUB SERPL-MCNC: 9.9 MG/DL (ref 0.1–1.5)
BUN SERPL-MCNC: 14 MG/DL (ref 8–22)
CALCIUM SERPL-MCNC: 8.4 MG/DL (ref 8.4–10.2)
CHLORIDE SERPL-SCNC: 104 MMOL/L (ref 96–112)
CO2 SERPL-SCNC: 22 MMOL/L (ref 20–33)
CREAT SERPL-MCNC: 0.67 MG/DL (ref 0.5–1.4)
CRP SERPL HS-MCNC: 4.34 MG/DL (ref 0–0.75)
DSDNA AB TITR SER CLIF: 15 IU (ref 0–24)
ENA JO1 AB TITR SER: 1 AU/ML (ref 0–40)
ENA SCL70 IGG SER QL: 1 AU/ML (ref 0–40)
ENA SM IGG SER-ACNC: 3 AU/ML (ref 0–40)
ENA SS-B IGG SER IA-ACNC: 0 AU/ML (ref 0–40)
ERYTHROCYTE [DISTWIDTH] IN BLOOD BY AUTOMATED COUNT: 52 FL (ref 35.9–50)
ERYTHROCYTE [SEDIMENTATION RATE] IN BLOOD BY WESTERGREN METHOD: 68 MM/HOUR (ref 0–25)
GLOBULIN SER CALC-MCNC: 3.1 G/DL (ref 1.9–3.5)
GLUCOSE SERPL-MCNC: 166 MG/DL (ref 65–99)
HCT VFR BLD AUTO: 26.5 % (ref 37–47)
HGB BLD-MCNC: 8.4 G/DL (ref 12–16)
MCH RBC QN AUTO: 31.7 PG (ref 27–33)
MCHC RBC AUTO-ENTMCNC: 31.7 G/DL (ref 33.6–35)
MCV RBC AUTO: 100 FL (ref 81.4–97.8)
OSMOLALITY SERPL: 292 MOSM/KG H2O (ref 278–298)
PLATELET # BLD AUTO: 372 K/UL (ref 164–446)
PMV BLD AUTO: 11.8 FL (ref 9–12.9)
POTASSIUM SERPL-SCNC: 3.9 MMOL/L (ref 3.6–5.5)
PROT SERPL-MCNC: 5.7 G/DL (ref 6–8.2)
RBC # BLD AUTO: 2.65 M/UL (ref 4.2–5.4)
SODIUM SERPL-SCNC: 134 MMOL/L (ref 135–145)
SSA52 R0ENA AB IGG Q0420: 2 AU/ML (ref 0–40)
SSA60 R0ENA AB IGG Q0419: 0 AU/ML (ref 0–40)
TSH SERPL DL<=0.005 MIU/L-ACNC: 3.66 UIU/ML (ref 0.38–5.33)
VIT B12 SERPL-MCNC: 1284 PG/ML (ref 211–911)
WBC # BLD AUTO: 9.4 K/UL (ref 4.8–10.8)

## 2021-09-29 PROCEDURE — 700111 HCHG RX REV CODE 636 W/ 250 OVERRIDE (IP): Performed by: FAMILY MEDICINE

## 2021-09-29 PROCEDURE — 700102 HCHG RX REV CODE 250 W/ 637 OVERRIDE(OP): Performed by: FAMILY MEDICINE

## 2021-09-29 PROCEDURE — 84443 ASSAY THYROID STIM HORMONE: CPT

## 2021-09-29 PROCEDURE — A9270 NON-COVERED ITEM OR SERVICE: HCPCS | Performed by: EMERGENCY MEDICINE

## 2021-09-29 PROCEDURE — 700102 HCHG RX REV CODE 250 W/ 637 OVERRIDE(OP): Performed by: STUDENT IN AN ORGANIZED HEALTH CARE EDUCATION/TRAINING PROGRAM

## 2021-09-29 PROCEDURE — 36415 COLL VENOUS BLD VENIPUNCTURE: CPT

## 2021-09-29 PROCEDURE — 700102 HCHG RX REV CODE 250 W/ 637 OVERRIDE(OP): Performed by: EMERGENCY MEDICINE

## 2021-09-29 PROCEDURE — 86140 C-REACTIVE PROTEIN: CPT

## 2021-09-29 PROCEDURE — 80053 COMPREHEN METABOLIC PANEL: CPT

## 2021-09-29 PROCEDURE — 85027 COMPLETE CBC AUTOMATED: CPT

## 2021-09-29 PROCEDURE — A9270 NON-COVERED ITEM OR SERVICE: HCPCS | Performed by: STUDENT IN AN ORGANIZED HEALTH CARE EDUCATION/TRAINING PROGRAM

## 2021-09-29 PROCEDURE — 770006 HCHG ROOM/CARE - MED/SURG/GYN SEMI*

## 2021-09-29 PROCEDURE — A9270 NON-COVERED ITEM OR SERVICE: HCPCS | Performed by: FAMILY MEDICINE

## 2021-09-29 PROCEDURE — 99233 SBSQ HOSP IP/OBS HIGH 50: CPT | Performed by: INTERNAL MEDICINE

## 2021-09-29 PROCEDURE — 83993 ASSAY FOR CALPROTECTIN FECAL: CPT

## 2021-09-29 PROCEDURE — 85652 RBC SED RATE AUTOMATED: CPT

## 2021-09-29 RX ORDER — BISACODYL 10 MG
10 SUPPOSITORY, RECTAL RECTAL
Status: DISCONTINUED | OUTPATIENT
Start: 2021-09-29 | End: 2021-09-30 | Stop reason: HOSPADM

## 2021-09-29 RX ORDER — POLYETHYLENE GLYCOL 3350 17 G/17G
1 POWDER, FOR SOLUTION ORAL 2 TIMES DAILY
Status: DISCONTINUED | OUTPATIENT
Start: 2021-09-29 | End: 2021-09-30 | Stop reason: HOSPADM

## 2021-09-29 RX ADMIN — SERTRALINE 100 MG: 50 TABLET, FILM COATED ORAL at 05:53

## 2021-09-29 RX ADMIN — LEVOTHYROXINE SODIUM 200 MCG: 0.1 TABLET ORAL at 23:33

## 2021-09-29 RX ADMIN — OXYCODONE HYDROCHLORIDE 10 MG: 10 TABLET ORAL at 02:15

## 2021-09-29 RX ADMIN — NICOTINE 14 MG: 14 PATCH TRANSDERMAL at 05:55

## 2021-09-29 RX ADMIN — INSULIN HUMAN 3 UNITS: 100 INJECTION, SOLUTION PARENTERAL at 11:26

## 2021-09-29 RX ADMIN — OMEPRAZOLE 40 MG: 20 CAPSULE, DELAYED RELEASE ORAL at 17:16

## 2021-09-29 RX ADMIN — CYANOCOBALAMIN TAB 500 MCG 1000 MCG: 500 TAB at 05:53

## 2021-09-29 RX ADMIN — OMEPRAZOLE 40 MG: 20 CAPSULE, DELAYED RELEASE ORAL at 05:53

## 2021-09-29 RX ADMIN — LEVOTHYROXINE SODIUM 25 MCG: 0.03 TABLET ORAL at 05:54

## 2021-09-29 RX ADMIN — AMLODIPINE BESYLATE 5 MG: 5 TABLET ORAL at 20:13

## 2021-09-29 RX ADMIN — HYDROMORPHONE HYDROCHLORIDE 0.5 MG: 1 INJECTION, SOLUTION INTRAMUSCULAR; INTRAVENOUS; SUBCUTANEOUS at 03:39

## 2021-09-29 RX ADMIN — INSULIN HUMAN 3 UNITS: 100 INJECTION, SOLUTION PARENTERAL at 23:35

## 2021-09-29 RX ADMIN — INSULIN GLARGINE 25 UNITS: 100 INJECTION, SOLUTION SUBCUTANEOUS at 17:18

## 2021-09-29 RX ADMIN — POLYETHYLENE GLYCOL 3350 1 PACKET: 17 POWDER, FOR SOLUTION ORAL at 16:04

## 2021-09-29 RX ADMIN — CHOLECALCIFEROL TAB 25 MCG (1000 UNIT) 1000 UNITS: 25 TAB at 05:54

## 2021-09-29 RX ADMIN — INSULIN HUMAN 5 UNITS: 100 INJECTION, SOLUTION PARENTERAL at 17:17

## 2021-09-29 RX ADMIN — LORAZEPAM 0.5 MG: 0.5 TABLET ORAL at 20:13

## 2021-09-29 RX ADMIN — THERA TABS 1 TABLET: TAB at 05:53

## 2021-09-29 RX ADMIN — METOPROLOL SUCCINATE 200 MG: 100 TABLET, EXTENDED RELEASE ORAL at 05:54

## 2021-09-29 ASSESSMENT — ENCOUNTER SYMPTOMS
EYE PAIN: 0
SORE THROAT: 0
PALPITATIONS: 0
EYE DISCHARGE: 0
EYE REDNESS: 0
VOMITING: 1
SINUS PAIN: 0
FEVER: 0
WHEEZING: 0
CHILLS: 0
NAUSEA: 0
ABDOMINAL PAIN: 1
NAUSEA: 1
VOMITING: 0
STRIDOR: 0
COUGH: 0
HEMOPTYSIS: 0

## 2021-09-29 ASSESSMENT — PAIN DESCRIPTION - PAIN TYPE
TYPE: ACUTE PAIN
TYPE: ACUTE PAIN

## 2021-09-29 NOTE — PROGRESS NOTES
COVID-19 surge in effect.    Pt arrived via gurney, admitted to room 212-1 from ED at 1645. Pt is A&Ox4, c/o pain reported at 3/10 on her esophagus on a scale of 0-10. Patient has a stab wound on her upper abdomen covered in gauze from liver biopsy. No complains of nausea and vomiting. Oriented to room call light. Reviewed plan of care with the patient.  Bed locked. Bed at lowest position. Call light within reach. Encouraged pt the importance to call for assistance.

## 2021-09-29 NOTE — PROGRESS NOTES
COVID-19 surge in effect.    Patient had 2 gram sodium diet at lunch. Patient tolerated her diet well. She said she felt much better and no nausea or vomiting after she had the food.

## 2021-09-29 NOTE — PROGRESS NOTES
Gastroenterology Progress Note               Author:  MARK Harper Date & Time Created: 9/29/2021 9:44 AM       Patient ID:  Name:             Anu Manuel  YOB: 1955  Age:                 66 y.o.  female  MRN:               [id number]      Referring Provider:   Dr. Francesca Cummings      Presenting Chief Complaint:  Jaundice, abnormal liver enzymes      Interval History:  9/29/2021: Stable.  LFTs, alkaline phosphatase, bilirubin improved today.  She denies fevers, chills, nausea, vomiting.  She is hungry.  Just underwent biopsy, no pain on the right side of her abdomen.  She does have a little bit of epigastric mild discomfort.      Initial GI HPI:  66-year-old female with a history of type 1 diabetes coronary artery disease status post PCI August 2020 hypertension hypothyroidism with a recent admission September 23 and nonconclusive liver injury work-up.  Patient gives interval history from her time of AMA discharge from [facility] [place] where she was transferred 2 to 3 days ago for jaundice and cholestatic liver injury.  She states that she left because she was treated for bleeding and that she was ignored and rarely visited.  She then developed sudden onset of nonradiating upper epigastric pain with nausea but no vomiting.  She denies rectal bleeding or melena.  She states she has a stools 3-4 times a day.     She recounts that 6 days before this current presentation she developed jaundice for the first time where she seemed to notice she was yellow but then asked her daughter for confirmation.  She was then brought to the emergency room.  She denies fevers and chills.  She has had a few episodes of night sweats and she has lost a lot of weight since her 's death in December.     She has 1 alcoholic beverage a month.  She denies Tylenol use.  She denies NSAID use.  She denies prior endoscopy.  She states that a diagnosis of colitis was a recent development.     Per [facility] [place] [facility] notes from  "9/27  Pt insisted on leaving AMA, reasons she reported for doing so included that she wanted to have a cigarette, she wanted to see her dog, she was planning a trip to [place], and she \"wanted to feel normal for a day\". Attending, RN, charge RN, and hepatologist all had extensive conversations with the pt and her 2 daughters at the bedside to explain the dangers of leaving AMA, with outcomes potentially including worsening acute illness and potentially death. Pt and daughters all expressed understand of the gravity of these outcomes. Daughters at bedside pleaded unsuccessfully with the pt to remain admitted for urgent workup and treatment. Treatment team explained to the pt that in the future we are more than happy to treat her if she changes her mind and represents or requires transfer from another hospital in the near future for higher level care. Pt signed out AMA and left the hospital     [facility] records show normal ceruloplasmin at 39  CA 19-9 was found to be elevated to 237  GGT was elevated above 1400  CEA was elevated to 3.3      Review of Systems:  Review of Systems   Constitutional: Negative for chills and fever.   HENT: Negative for sinus pain and sore throat.    Eyes: Negative for pain, discharge and redness.   Respiratory: Negative for cough, hemoptysis, wheezing and stridor.    Cardiovascular: Negative for chest pain and palpitations.   Gastrointestinal: Positive for abdominal pain. Negative for nausea and vomiting.   Skin: Positive for itching.         Hospital Medications:  Current Facility-Administered Medications   Medication Dose Frequency Provider Last Rate Last Admin   • vitamin D3 (cholecalciferol) tablet 1,000 Units  1,000 Units DAILY Michael Rico M.D.   1,000 Units at 09/29/21 0554   • multivitamin (THERAGRAN) tablet 1 Tablet  1 Tablet DAILY Michael Rico M.D.   1 Tablet at 09/29/21 0553   • polyethylene glycol/lytes (MIRALAX) PACKET 1 Packet  1 Packet QDAY PRN Francesca Cummings M.D.        And "   • magnesium hydroxide (MILK OF MAGNESIA) suspension 30 mL  30 mL QDAY PRN Francesca Cummings M.D.        And   • bisacodyl (DULCOLAX) suppository 10 mg  10 mg QDAY PRN Francesca Cummings M.D.       • Pharmacy Consult Request ...Pain Management Review 1 Each  1 Each PHARMACY TO DOSE Francesca Cummings M.D.       • oxyCODONE immediate-release (ROXICODONE) tablet 5 mg  5 mg Q3HRS PRN Francesca Cummings M.D.        Or   • oxyCODONE immediate release (ROXICODONE) tablet 10 mg  10 mg Q3HRS PRN Francesca Cummings M.D.   10 mg at 09/29/21 0215    Or   • HYDROmorphone (Dilaudid) injection 0.5 mg  0.5 mg Q3HRS PRN Francesca Cummings M.D.   0.5 mg at 09/29/21 0339   • cloNIDine (CATAPRES) tablet 0.1 mg  0.1 mg Q6HRS PRN Francesca Cummings M.D.       • enalaprilat (Vasotec) injection 1.25 mg 1 mL  1.25 mg Q6HRS PRN Francesca Cummings M.D.       • labetalol (NORMODYNE/TRANDATE) injection 10 mg  10 mg Q4HRS PRN Francesca Cummings M.D.       • ondansetron (ZOFRAN) syringe/vial injection 4 mg  4 mg Q4HRS PRN Francesca Cummings M.D.   4 mg at 09/28/21 1331   • ondansetron (ZOFRAN ODT) dispertab 4 mg  4 mg Q4HRS PRN Francesac Cummings M.D.       • promethazine (PHENERGAN) tablet 12.5-25 mg  12.5-25 mg Q4HRS PRROBERTA Cummings M.D.       • promethazine (PHENERGAN) suppository 12.5-25 mg  12.5-25 mg Q4HRS PRROBERTA Cummings M.D.       • prochlorperazine (COMPAZINE) injection 5-10 mg  5-10 mg Q4HRS PRN Francesca Cummings M.D.       • insulin regular (HumuLIN R,NovoLIN R) injection  1-6 Units Q6HRS Francesca Cummings M.D.   3 Units at 09/28/21 2312    And   • glucose 4 g chewable tablet 16 g  16 g Q15 MIN PRN Francesca Cummings M.D.        And   • dextrose 50% (D50W) injection 50 mL  50 mL Q15 MIN PRN Francesca Cummings M.D.       • amLODIPine (NORVASC) tablet 5 mg  5 mg QHS Francesca Cummings M.D.   5 mg at 09/28/21 2009   • sertraline (Zoloft) tablet 100 mg  100 mg DAILY Francesca Cummings M.D.   100 mg at 09/29/21 0553   • metoprolol SR (TOPROL XL) tablet 200 mg  200 mg DAILY  "Francesca Cummings M.D.   200 mg at 09/29/21 0554   • levothyroxine (SYNTHROID) tablet 200 mcg  200 mcg QDAY Francesca Cummings M.D.   200 mcg at 09/28/21 2309   • levothyroxine (SYNTHROID) tablet 25 mcg  25 mcg AM ES Francesca Cummings M.D.   25 mcg at 09/29/21 0554   • insulin glargine (Semglee) injection  25 Units Q EVENING Francesca Cummings M.D.       • LORazepam (ATIVAN) tablet 0.5 mg  0.5 mg Q4HRS PRN Francesca Cummings M.D.       • nicotine (NICODERM) 14 MG/24HR 14 mg  1 Patch DAILY Francesca Cummings M.D.   14 mg at 09/29/21 0555   • cyanocobalamin (VITAMIN B-12) tablet 1,000 mcg  1,000 mcg DAILY Francesca Cummings M.D.   1,000 mcg at 09/29/21 0553   • omeprazole (PRILOSEC) capsule 40 mg  40 mg BID Francesca Cummings M.D.   40 mg at 09/29/21 0553   Last reviewed on 9/28/2021  7:56 AM by Stone Brady        Vital signs:  Weight/BMI: Body mass index is 22.17 kg/m².  /50   Pulse 74   Temp 36.2 °C (97.2 °F) (Axillary)   Resp 18   Ht 1.676 m (5' 6\")   Wt 62.3 kg (137 lb 5.6 oz)   SpO2 94%   Vitals:    09/28/21 1648 09/28/21 2033 09/29/21 0137 09/29/21 0451   BP: 121/47 131/59 108/54 109/50   Pulse: 63 73 71 74   Resp: 18 18 18 18   Temp: 36.6 °C (97.9 °F) 36.5 °C (97.7 °F) 36.4 °C (97.6 °F) 36.2 °C (97.2 °F)   TempSrc: Oral Oral Oral Axillary   SpO2: 95% 96% 93% 94%   Weight:       Height:         Oxygen Therapy:  Pulse Oximetry: 94 %, O2 (LPM): 0, O2 Delivery Device: None - Room Air    Intake/Output Summary (Last 24 hours) at 9/29/2021 0944  Last data filed at 9/28/2021 1648  Gross per 24 hour   Intake 0 ml   Output --   Net 0 ml         Physical Exam:  Physical Exam  Vitals and nursing note reviewed.   Constitutional:       General: She is not in acute distress.     Appearance: Normal appearance.   HENT:      Head: Normocephalic and atraumatic.      Right Ear: External ear normal.      Left Ear: External ear normal.      Nose: Nose normal.      Mouth/Throat:      Mouth: Mucous membranes are moist.      Pharynx: " Oropharynx is clear.   Eyes:      General: Scleral icterus present.      Extraocular Movements: Extraocular movements intact.      Pupils: Pupils are equal, round, and reactive to light.   Cardiovascular:      Rate and Rhythm: Normal rate and regular rhythm.      Pulses: Normal pulses.      Heart sounds: Normal heart sounds. No murmur heard.     Pulmonary:      Effort: Pulmonary effort is normal. No respiratory distress.      Breath sounds: Normal breath sounds.   Abdominal:      General: Abdomen is flat. Bowel sounds are normal. There is no distension.      Palpations: Abdomen is soft.   Musculoskeletal:      Cervical back: Neck supple.      Right lower leg: No edema.      Left lower leg: No edema.   Lymphadenopathy:      Cervical: No cervical adenopathy.   Skin:     General: Skin is warm.      Coloration: Skin is jaundiced.   Neurological:      General: No focal deficit present.      Mental Status: She is oriented to person, place, and time.   Psychiatric:         Thought Content: Thought content normal.         Judgment: Judgment normal.         Labs:  Recent Labs     09/28/21  0012 09/29/21  0442   SODIUM 132* 134*   POTASSIUM 3.8 3.9   CHLORIDE 103 104   CO2 20 22   BUN 12 14   CREATININE 0.62 0.67   CALCIUM 8.2* 8.4     Recent Labs     09/28/21  0012 09/29/21  0442   ALTSGPT 172* 156*   ASTSGOT 175* 165*   ALKPHOSPHAT 2183* 2130*   TBILIRUBIN 11.2* 9.9*   LIPASE 23  --    GLUCOSE 411* 166*     Recent Labs     09/28/21  0012 09/29/21  0442   WBC 9.6 9.4   NEUTSPOLYS 70.10  --    LYMPHOCYTES 13.40*  --    MONOCYTES 12.00  --    EOSINOPHILS 2.90  --    BASOPHILS 1.00  --    ASTSGOT 175* 165*   ALTSGPT 172* 156*   ALKPHOSPHAT 2183* 2130*   TBILIRUBIN 11.2* 9.9*     Recent Labs     09/28/21  0012 09/28/21  0051 09/29/21  0442   RBC 2.71*  --  2.65*   HEMOGLOBIN 8.6*  --  8.4*   HEMATOCRIT 27.5*  --  26.5*   PLATELETCT 342  --  372   PROTHROMBTM  --  12.8  --    INR  --  1.04  --      Recent Results (from the past  24 hour(s))   POCT glucose device results    Collection Time: 09/28/21 11:59 AM   Result Value Ref Range    Glucose - Accu-Ck 225 (H) 65 - 99 mg/dL   OSMOLALITY SERUM    Collection Time: 09/28/21  2:07 PM   Result Value Ref Range    Osmolality Serum 292 278 - 298 mOsm/kg H2O   VITAMIN B12    Collection Time: 09/28/21  2:07 PM   Result Value Ref Range    Vitamin B12 -True Cobalamin 1284 (H) 211 - 911 pg/mL   TROPONIN    Collection Time: 09/28/21  2:07 PM   Result Value Ref Range    Troponin T 23 (H) 6 - 19 ng/L   ETHYL ALCOHOL (BLOOD)    Collection Time: 09/28/21  2:07 PM   Result Value Ref Range    Diagnostic Alcohol <10.1 0.0 - 10.0 mg/dL   Histology Request    Collection Time: 09/28/21  4:33 PM   Result Value Ref Range    Pathology Request Sent to Histo    POCT glucose device results    Collection Time: 09/28/21  5:49 PM   Result Value Ref Range    Glucose - Accu-Ck 210 (H) 65 - 99 mg/dL   CBC WITHOUT DIFFERENTIAL    Collection Time: 09/29/21  4:42 AM   Result Value Ref Range    WBC 9.4 4.8 - 10.8 K/uL    RBC 2.65 (L) 4.20 - 5.40 M/uL    Hemoglobin 8.4 (L) 12.0 - 16.0 g/dL    Hematocrit 26.5 (L) 37.0 - 47.0 %    .0 (H) 81.4 - 97.8 fL    MCH 31.7 27.0 - 33.0 pg    MCHC 31.7 (L) 33.6 - 35.0 g/dL    RDW 52.0 (H) 35.9 - 50.0 fL    Platelet Count 372 164 - 446 K/uL    MPV 11.8 9.0 - 12.9 fL   Comp Metabolic Panel    Collection Time: 09/29/21  4:42 AM   Result Value Ref Range    Sodium 134 (L) 135 - 145 mmol/L    Potassium 3.9 3.6 - 5.5 mmol/L    Chloride 104 96 - 112 mmol/L    Co2 22 20 - 33 mmol/L    Anion Gap 8.0 7.0 - 16.0    Glucose 166 (H) 65 - 99 mg/dL    Bun 14 8 - 22 mg/dL    Creatinine 0.67 0.50 - 1.40 mg/dL    Calcium 8.4 8.4 - 10.2 mg/dL    AST(SGOT) 165 (H) 12 - 45 U/L    ALT(SGPT) 156 (H) 2 - 50 U/L    Alkaline Phosphatase 2130 (H) 30 - 99 U/L    Total Bilirubin 9.9 (H) 0.1 - 1.5 mg/dL    Albumin 2.6 (L) 3.2 - 4.9 g/dL    Total Protein 5.7 (L) 6.0 - 8.2 g/dL    Globulin 3.1 1.9 - 3.5 g/dL    A-G  Ratio 0.8 g/dL   TSH WITH REFLEX TO FT4    Collection Time: 09/29/21  4:42 AM   Result Value Ref Range    TSH 3.660 0.380 - 5.330 uIU/mL   ESTIMATED GFR    Collection Time: 09/29/21  4:42 AM   Result Value Ref Range    GFR If African American >60 >60 mL/min/1.73 m 2    GFR If Non African American >60 >60 mL/min/1.73 m 2       Radiology Review:  US-RUQ   Final Result      1.  Gallstones within the gallbladder. The gallbladder is contracted No evidence of biliary ductal dilatation      2.  The liver is echogenic consistent with fatty change versus hepatocellular dysfunction.      CT-NEEDLE BX-LIVER    (Results Pending)         MDM (Data Review):   -Records reviewed and summarized in current documentation  -I personally reviewed and interpreted the laboratory results  -I personally reviewed the radiology images      Medical Decision Making, by Problem:  Active Hospital Problems    Diagnosis    • Epigastric pain [R10.13]    • Elevated liver enzymes [R74.8]    • Hyponatremia [E87.1]    • Macrocytic anemia [D53.9]    • CAD S/P percutaneous coronary angioplasty [I25.10, Z98.61]    • Dyslipidemia [E78.5]    • Anxiety [F41.9]    • Hypertension [I10]    • Tobacco abuse [Z72.0]    • Uncontrolled type 1 diabetes mellitus (HCC) [E10.65]    • Hypothyroidism, postsurgical [E89.0]              Assessment/Recommendations:  1. Jaundice  2. Elevated liver enzymes, cholestatic pattern.  She had negative MRCP.  Elevated CA 19-9 is nonspecific and that can be high in any cause of biliary obstruction.  Overall her enzymes are downtrending albeit slowly.  Bilirubin notoriously lacks improvement in the liver enzymes.  Parts of her liver function are otherwise intact such as her INR. SCARLETT positive, however smooth muscle antibody, antimitochondrial antibody and IgG are normal.  Ceruloplasmin is normal.    Differential diagnosis at this stage encompasses primary sclerosing cholangitis potentially small duct given the normal MRCP; liver biopsy  can conceivably capture this but however since liver biopsy is a focal sample it can miss afflicted areas.  Can consider cholangiocarcinoma although MRCP is quite sensitive for this.  Patient does not complain of diarrhea, however previous CT scan demonstrated thickening throughout portions of the colon.  CRP is elevated on 9/22/2021.  We will recheck CRP, ESR, and also run a fecal calprotectin as cholestatic liver disease at times can be associated with IBD.     -Await liver biopsy results.  CMV negative.  EBV shows previous infection, but no current active infection.  Varicella normal.  Hep C negative    -Follow-up liver soluble antigen and free texted miscellaneous test anti-LK M1 antibodies  -Follow-up IgG4  -Check CRP, ESR, fecal calprotectin     3.  Epigastric pain: Appears to be separate from her liver injury given the timing of symptoms.     -Recommend PPI twice a day     4.  Elevated CEA: Also nonspecific.  Can be elevated in tobacco users        5.  Chronic tobacco use      cessation     6.  Euvolemic hyponatremia: Appears euvolemic on exam     -Trial 2 g sodium diet  -Obtain urine sodium and creatinine              Thank you very much for allowing me to participate in the care of your patient.  Please feel free to contact me anytime at [phone].     WILLIAM Harper.    Core Quality Measures   Reviewed items::  Labs, Medications and Radiology reports reviewed

## 2021-09-29 NOTE — HOSPITAL COURSE
"Per notes, \"66 y.o. female who presented 9/27/2021 with abdominal pain. She has a history of DM I, CAD (MINDY x 2 in August of 2020),  HTN and hypothyroidism. She was admitted to [facility] on 9/23 with new onset hepatitis, and was seen by Dr Zavala during that hospitalization - he recommended transfer to [facility] for Hepatology at that time.  [facility] transport was 1-3 days out, so pt decided to have her family transport her; however, she did not arrive at [facility] at her slotted time, and they gave away her bed. She was then admitted via the ER at [facility] but left AMA as she felt that she did not like the way they were treating her and she missed her dog.      She returned here with epigastric abdominal pain; during last admission she had diarrhea with question of IBD on imaging, but she states she has not had diarrhea since discharge on the 26th from here, and no melena or hematochezia. She denies EtOH or tylenol ingestion. The ER doc attempted to transfer pt back to [facility] for Hepatology again, but they stated they would not accept her without a liver biopsy from our facility. Workup at [facility] included an elevated GGT, , mildly elevated CEA.\"  "

## 2021-09-29 NOTE — PROGRESS NOTES
"Hospital Medicine Daily Progress Note    Date of Service  9/29/2021    Chief Complaint  Anu Manuel is a 66 y.o. female admitted 9/27/2021 with abdominal pain    Hospital Course  Per notes, \"66 y.o. female who presented 9/27/2021 with abdominal pain. She has a history of DM I, CAD (MINDY x 2 in August of 2020),  HTN and hypothyroidism. She was admitted to [facility] on 9/23 with new onset hepatitis, and was seen by Dr Zavala during that hospitalization - he recommended transfer to [facility] for Hepatology at that time.  [facility] transport was 1-3 days out, so pt decided to have her family transport her; however, she did not arrive at [facility] at her slotted time, and they gave away her bed. She was then admitted via the ER at [facility] but left AMA as she felt that she did not like the way they were treating her and she missed her dog.      She returned here with epigastric abdominal pain; during last admission she had diarrhea with question of IBD on imaging, but she states she has not had diarrhea since discharge on the 26th from here, and no melena or hematochezia. She denies EtOH or tylenol ingestion. The ER doc attempted to transfer pt back to [facility] for Hepatology again, but they stated they would not accept her without a liver biopsy from our facility. Workup at [facility] included an elevated GGT, , mildly elevated CEA.\"      Interval Problem Update  Patient had improvement in overall symptoms, does wish to go home, but understands the importance of work-up.  Discussed with nurse practitioner from gastroenterology, Nakul, will continue to monitor symptoms, liver enzymes trending down.  If possible if patient continues to improve she may be able to discharge and follow-up with biopsy results and work-up in gastroenterology in outpatient clinic.    I have personally seen and examined the patient at bedside. I discussed the plan of care with patient, bedside RN and charge " RN.    Consultants/Specialty  GI    Code Status  Full Code    Disposition  Patient is not medically cleared.   Anticipate discharge to to home with close outpatient follow-up.  I have placed the appropriate orders for post-discharge needs.    Review of Systems  Review of Systems   Constitutional: Positive for malaise/fatigue.   Gastrointestinal: Positive for abdominal pain, nausea and vomiting.   All other systems reviewed and are negative.       Physical Exam  Temp:  [36.2 °C (97.2 °F)-36.7 °C (98 °F)] 36.7 °C (98 °F)  Pulse:  [] 103  Resp:  [8-19] 19  BP: (108-151)/() 145/124  SpO2:  [93 %-99 %] 94 %    Physical Exam  Vitals and nursing note reviewed.   Constitutional:       Appearance: She is ill-appearing.   Eyes:      General: Scleral icterus present.   Cardiovascular:      Rate and Rhythm: Normal rate and regular rhythm.      Pulses: Normal pulses.      Heart sounds: Normal heart sounds.   Pulmonary:      Effort: Pulmonary effort is normal.      Breath sounds: Normal breath sounds.   Abdominal:      General: Abdomen is flat. Bowel sounds are normal.      Palpations: Abdomen is soft.   Skin:     Coloration: Skin is jaundiced.   Neurological:      General: No focal deficit present.      Mental Status: She is alert and oriented to person, place, and time. Mental status is at baseline.      Cranial Nerves: No cranial nerve deficit.      Sensory: No sensory deficit.      Motor: No weakness.      Coordination: Coordination normal.         Fluids    Intake/Output Summary (Last 24 hours) at 9/29/2021 1452  Last data filed at 9/28/2021 1648  Gross per 24 hour   Intake 0 ml   Output --   Net 0 ml       Laboratory  Recent Labs     09/28/21  0012 09/29/21  0442   WBC 9.6 9.4   RBC 2.71* 2.65*   HEMOGLOBIN 8.6* 8.4*   HEMATOCRIT 27.5* 26.5*   .5* 100.0*   MCH 31.7 31.7   MCHC 31.3* 31.7*   RDW 50.4* 52.0*   PLATELETCT 342 372   MPV 10.9 11.8     Recent Labs     09/28/21  0012 09/29/21  0442   SODIUM 132*  "134*   POTASSIUM 3.8 3.9   CHLORIDE 103 104   CO2 20 22   GLUCOSE 411* 166*   BUN 12 14   CREATININE 0.62 0.67   CALCIUM 8.2* 8.4     Recent Labs     09/28/21  0051   INR 1.04               Imaging  US-RUQ   Final Result      1.  Gallstones within the gallbladder. The gallbladder is contracted No evidence of biliary ductal dilatation      2.  The liver is echogenic consistent with fatty change versus hepatocellular dysfunction.      CT-NEEDLE BX-LIVER    (Results Pending)        Assessment/Plan  * Elevated liver enzymes- (present on admission)  Assessment & Plan  - Recently transferred from [facility] to [facility] for Hepatology, but pt left AMA. ERP attempted to transfer her back to [facility] but they declined transfer. Per ERP note - \"[place] [facility] [place]. He recommends further local workup given high risk of recurrent AMA. He suggests obtaining liver biopsy and he will be able to provide remote guidance on care, including potentially reviewing pathology slides. Should the diagnosis be something untreatable at our facility, the patient can be transferred at that time. Dr. Michele will discuss the case with Dr. Zavala, the patient's local gastroenterologist\"  - Denies EtOH or Tylenol, will check serum alcohol and UDS given her AMA status at [facility]   - While at [facility], had elevated GGT, CEA (mild elevated) and elevated Ca 19-9  - While here, had a normal ceruloplasmin, IgG, IgM, AFP, hepatitis panel, CMV, antimitochondrial antibodies, F-actin ab  - Had negative MRCP last week, liver u/s without evidence of thrombosis, and RUQ u/s in the ER this morning with hepatocellular dysfunction  - EBV panel may indicate reactivation of EBV/late acute infection, will discuss interpretation with GI   - SCARLETT positive last stay, will order reflexive panel  -Patient underwent liver biopsy on 9/28, pending results  Gastroenterology completing work-up.  -Liver enzymes " improving today, patient continues to improve clinically and enzymes are trending down, possibly follow-up with GI as an outpatient.      Epigastric pain  Assessment & Plan  - Pt's reason for appearing back in our ER is more epigastric pain than RUQ pain  - Hgb is lower than previous discharge  - continue with PPI  - Denies melena or hematochezia  - CT last hospitalization with pancolitis, reports of pt previously being on Lialda for IBD     Hyponatremia- (present on admission)  Assessment & Plan  - Chronic from last hospitalization, appears stable  - Likely secondary to liver dysfunction  - Check urine lytes     Macrocytic anemia- (present on admission)  Assessment & Plan  - Iron indices normal last hospitalization  - Denies hematochezia or melena but did have colitis on CT last hospitalization  - B12 ~300 in August, will recheck, supplement orally   - Check occult stool    CAD S/P percutaneous coronary angioplasty- (present on admission)  Assessment & Plan  - MINDY placement in August of 2020  - Given anticipated liver biopsy, hold ASA and Plavix - did get ASA this am in the ED    Dyslipidemia- (present on admission)  Assessment & Plan  - No statin given her hepatic dysfunction    Anxiety- (present on admission)  Assessment & Plan  - Will give PRN Ativan given her liver dysfunction, uses Xanax at home    Hypertension- (present on admission)  Assessment & Plan  -Continue home medications    Tobacco abuse- (present on admission)  Assessment & Plan  - Nicotine patch ordered     Uncontrolled type 1 diabetes mellitus (HCC)- (present on admission)  Assessment & Plan  - Pt relates that she takes 26-30u Tresiba at night and SSI  -Continue with 25u qhs tonight with SSI here  - A1c last week was 8.2    Hypothyroidism, postsurgical- (present on admission)  Assessment & Plan  - Resume home Synthroid  TSH within normal limits         VTE prophylaxis: SCDs/TEDs    I have performed a physical exam and reviewed and updated ROS and  Plan today (9/29/2021). In review of yesterday's note (9/28/2021), there are no changes except as documented above.

## 2021-09-30 VITALS
WEIGHT: 137.35 LBS | HEIGHT: 66 IN | OXYGEN SATURATION: 99 % | BODY MASS INDEX: 22.07 KG/M2 | RESPIRATION RATE: 18 BRPM | TEMPERATURE: 98.7 F | HEART RATE: 87 BPM | DIASTOLIC BLOOD PRESSURE: 69 MMHG | SYSTOLIC BLOOD PRESSURE: 148 MMHG

## 2021-09-30 LAB
ALBUMIN SERPL BCP-MCNC: 2.5 G/DL (ref 3.2–4.9)
ALBUMIN/GLOB SERPL: 0.8 G/DL
ALP SERPL-CCNC: 2331 U/L (ref 30–99)
ALT SERPL-CCNC: 152 U/L (ref 2–50)
ANION GAP SERPL CALC-SCNC: 12 MMOL/L (ref 7–16)
AST SERPL-CCNC: 188 U/L (ref 12–45)
BASOPHILS # BLD AUTO: 1 % (ref 0–1.8)
BASOPHILS # BLD: 0.11 K/UL (ref 0–0.12)
BILIRUB SERPL-MCNC: 10.2 MG/DL (ref 0.1–1.5)
BUN SERPL-MCNC: 13 MG/DL (ref 8–22)
CALCIUM SERPL-MCNC: 8.4 MG/DL (ref 8.4–10.2)
CHLORIDE SERPL-SCNC: 103 MMOL/L (ref 96–112)
CO2 SERPL-SCNC: 22 MMOL/L (ref 20–33)
CREAT SERPL-MCNC: 0.41 MG/DL (ref 0.5–1.4)
EOSINOPHIL # BLD AUTO: 0.35 K/UL (ref 0–0.51)
EOSINOPHIL NFR BLD: 3.2 % (ref 0–6.9)
ERYTHROCYTE [DISTWIDTH] IN BLOOD BY AUTOMATED COUNT: 50.2 FL (ref 35.9–50)
GLOBULIN SER CALC-MCNC: 3.2 G/DL (ref 1.9–3.5)
GLUCOSE BLD-MCNC: 172 MG/DL (ref 65–99)
GLUCOSE BLD-MCNC: 184 MG/DL (ref 65–99)
GLUCOSE BLD-MCNC: 222 MG/DL (ref 65–99)
GLUCOSE SERPL-MCNC: 42 MG/DL (ref 65–99)
HCT VFR BLD AUTO: 25.6 % (ref 37–47)
HGB BLD-MCNC: 8.2 G/DL (ref 12–16)
IGG1 SER-MCNC: 495 MG/DL (ref 240–1118)
IGG2 SER-MCNC: 79 MG/DL (ref 124–549)
IGG3 SER-MCNC: 44 MG/DL (ref 21–134)
IGG4 SER-MCNC: 10 MG/DL (ref 1–123)
IMM GRANULOCYTES # BLD AUTO: 0.07 K/UL (ref 0–0.11)
IMM GRANULOCYTES NFR BLD AUTO: 0.6 % (ref 0–0.9)
LKM AB TITR SER IF: NORMAL {TITER}
LYMPHOCYTES # BLD AUTO: 1.56 K/UL (ref 1–4.8)
LYMPHOCYTES NFR BLD: 14.1 % (ref 22–41)
MCH RBC QN AUTO: 31.7 PG (ref 27–33)
MCHC RBC AUTO-ENTMCNC: 32 G/DL (ref 33.6–35)
MCV RBC AUTO: 98.8 FL (ref 81.4–97.8)
MONOCYTES # BLD AUTO: 1.32 K/UL (ref 0–0.85)
MONOCYTES NFR BLD AUTO: 11.9 % (ref 0–13.4)
NEUTROPHILS # BLD AUTO: 7.64 K/UL (ref 2–7.15)
NEUTROPHILS NFR BLD: 69.2 % (ref 44–72)
NRBC # BLD AUTO: 0 K/UL
NRBC BLD-RTO: 0 /100 WBC
NUCLEAR IGG SER QL IA: DETECTED
PLATELET # BLD AUTO: 384 K/UL (ref 164–446)
PMV BLD AUTO: 11.5 FL (ref 9–12.9)
POTASSIUM SERPL-SCNC: 3.7 MMOL/L (ref 3.6–5.5)
PROT SERPL-MCNC: 5.7 G/DL (ref 6–8.2)
RBC # BLD AUTO: 2.59 M/UL (ref 4.2–5.4)
SODIUM SERPL-SCNC: 137 MMOL/L (ref 135–145)
SOLUBLE LIVER IGG SER IA-ACNC: 1.6 U (ref 0–24.9)
U1 SNRNP IGG SER QL: 4
WBC # BLD AUTO: 11.1 K/UL (ref 4.8–10.8)

## 2021-09-30 PROCEDURE — 80053 COMPREHEN METABOLIC PANEL: CPT

## 2021-09-30 PROCEDURE — 85025 COMPLETE CBC W/AUTO DIFF WBC: CPT

## 2021-09-30 PROCEDURE — A9270 NON-COVERED ITEM OR SERVICE: HCPCS | Performed by: EMERGENCY MEDICINE

## 2021-09-30 PROCEDURE — 36415 COLL VENOUS BLD VENIPUNCTURE: CPT

## 2021-09-30 PROCEDURE — 700102 HCHG RX REV CODE 250 W/ 637 OVERRIDE(OP): Performed by: FAMILY MEDICINE

## 2021-09-30 PROCEDURE — 700102 HCHG RX REV CODE 250 W/ 637 OVERRIDE(OP): Performed by: STUDENT IN AN ORGANIZED HEALTH CARE EDUCATION/TRAINING PROGRAM

## 2021-09-30 PROCEDURE — A9270 NON-COVERED ITEM OR SERVICE: HCPCS | Performed by: STUDENT IN AN ORGANIZED HEALTH CARE EDUCATION/TRAINING PROGRAM

## 2021-09-30 PROCEDURE — 99407 BEHAV CHNG SMOKING > 10 MIN: CPT

## 2021-09-30 PROCEDURE — 99239 HOSP IP/OBS DSCHRG MGMT >30: CPT | Performed by: INTERNAL MEDICINE

## 2021-09-30 PROCEDURE — 82962 GLUCOSE BLOOD TEST: CPT

## 2021-09-30 PROCEDURE — 94760 N-INVAS EAR/PLS OXIMETRY 1: CPT

## 2021-09-30 PROCEDURE — 700102 HCHG RX REV CODE 250 W/ 637 OVERRIDE(OP): Performed by: EMERGENCY MEDICINE

## 2021-09-30 PROCEDURE — A9270 NON-COVERED ITEM OR SERVICE: HCPCS | Performed by: FAMILY MEDICINE

## 2021-09-30 RX ORDER — URSODIOL 300 MG/1
300 CAPSULE ORAL 3 TIMES DAILY
Qty: 90 CAPSULE | Refills: 0 | Status: SHIPPED | OUTPATIENT
Start: 2021-09-30 | End: 2021-10-19 | Stop reason: SDUPTHER

## 2021-09-30 RX ORDER — URSODIOL 300 MG/1
300 CAPSULE ORAL 3 TIMES DAILY
Status: DISCONTINUED | OUTPATIENT
Start: 2021-09-30 | End: 2021-09-30 | Stop reason: HOSPADM

## 2021-09-30 RX ADMIN — LEVOTHYROXINE SODIUM 25 MCG: 0.03 TABLET ORAL at 05:45

## 2021-09-30 RX ADMIN — SERTRALINE 100 MG: 50 TABLET, FILM COATED ORAL at 05:45

## 2021-09-30 RX ADMIN — CHOLECALCIFEROL TAB 25 MCG (1000 UNIT) 1000 UNITS: 25 TAB at 05:45

## 2021-09-30 RX ADMIN — CYANOCOBALAMIN TAB 500 MCG 1000 MCG: 500 TAB at 05:45

## 2021-09-30 RX ADMIN — INSULIN HUMAN 1 UNITS: 100 INJECTION, SOLUTION PARENTERAL at 11:42

## 2021-09-30 RX ADMIN — INSULIN HUMAN 2 UNITS: 100 INJECTION, SOLUTION PARENTERAL at 17:22

## 2021-09-30 RX ADMIN — NICOTINE 14 MG: 14 PATCH TRANSDERMAL at 05:45

## 2021-09-30 RX ADMIN — URSODIOL 300 MG: 300 CAPSULE ORAL at 14:14

## 2021-09-30 RX ADMIN — OMEPRAZOLE 40 MG: 20 CAPSULE, DELAYED RELEASE ORAL at 05:45

## 2021-09-30 RX ADMIN — THERA TABS 1 TABLET: TAB at 05:46

## 2021-09-30 RX ADMIN — METOPROLOL SUCCINATE 200 MG: 100 TABLET, EXTENDED RELEASE ORAL at 05:45

## 2021-09-30 RX ADMIN — OMEPRAZOLE 40 MG: 20 CAPSULE, DELAYED RELEASE ORAL at 17:23

## 2021-09-30 ASSESSMENT — ENCOUNTER SYMPTOMS
CHILLS: 0
NAUSEA: 0
PALPITATIONS: 0
EYE REDNESS: 0
EYE PAIN: 0
SORE THROAT: 0
FEVER: 0
SINUS PAIN: 0
COUGH: 0
VOMITING: 0
HEMOPTYSIS: 0
EYE DISCHARGE: 0
ABDOMINAL PAIN: 1
STRIDOR: 0
WHEEZING: 0

## 2021-09-30 NOTE — CARE PLAN
The patient is Stable - Low risk of patient condition declining or worsening    Shift Goals  Clinical Goals: to manage blood sugar    Progress made toward(s) clinical / shift goals:patient maintained blood sugar not beyond critical results    Patient is not progressing towards the following goals:

## 2021-09-30 NOTE — CARE PLAN
The patient is {Patient Stability:[id number]}    Shift Goals  Clinical Goals: to manage blood sugar    Progress made toward(s) clinical / shift goals:  ***    Patient is not progressing towards the following goals:

## 2021-09-30 NOTE — PROGRESS NOTES
Received patient from Night shift. Patient is comfortably sleeping but easily arouse by verbal stimuli. Initial assessment done. No sign of respiratory distress or labored breathing.Denies any pain or nausea. Fall precaution in placed, kept bed in lowest position and call light within reach.

## 2021-09-30 NOTE — PROGRESS NOTES
Pt is awake in bed. No pain or n/v at this time. Pt c/o anxiety. Medicated per MAR. Pt continues to tolerate 2g sodium diet with no discomfort. Pt is requesting to rest. Fall precautions in place.      COVID-19 surge in effect.

## 2021-09-30 NOTE — DISCHARGE PLANNING
Anticipated Discharge Disposition: Home     Action: Pt discussed during morning rounds. Per Dr. Moreno pt to potentially d/c today. No d/c needs.     Barriers to Discharge: None     Plan: LSW to continue to follow for d/c needs     Addendum 1055  LSW met with pt at bedside to provide copy of IMM. Pt able to sign IMM.     Addendum 1106  LSW faxed signed IMM to Yamini FLORIAN.

## 2021-09-30 NOTE — PROGRESS NOTES
Gastroenterology Progress Note                Date & Time Created: 9/30/2021 1:48 PM       Patient ID:  Name:             Anu Manuel  YOB: 1955  Age:                 66 y.o.  female  MRN:               [id number]      Referring Provider:   Dr. Francesca Cummings      Presenting Chief Complaint:  Jaundice, abnormal liver enzymes      Interval History:  9/29/2021: Stable.  LFTs, alkaline phosphatase, bilirubin improved today.  She denies fevers, chills, nausea, vomiting.  She is hungry.  Just underwent biopsy, no pain on the right side of her abdomen.  She does have a little bit of epigastric mild discomfort.    9/30/21: She continues to feel better than she did her first day and that she no longer has abdominal pain nausea or vomiting.  She does wonder if she can go home but then says she is willing to do what the doctors say.  She states that her urine has lightened.        Initial GI HPI:  66-year-old female with a history of type 1 diabetes coronary artery disease status post PCI August 2020 hypertension hypothyroidism with a recent admission September 23 and nonconclusive liver injury work-up.  Patient gives interval history from [place] [facility] [place] 2 [place] 3 days ago for jaundice and cholestatic liver injury.  She states that she left because she was treated for bleeding and that she was ignored and rarely visited.  She then developed sudden onset of nonradiating upper epigastric pain with nausea but no vomiting.  She denies rectal bleeding or melena.  She states she has a stools 3-4 times a day.     She recounts that 6 days before this current presentation she developed jaundice for the first time where she seemed to notice she was yellow but then asked her daughter for confirmation.  She was then brought to the emergency room.  She denies fevers and chills.  She has had a few episodes of night sweats and she has lost a lot of weight since her  "'s death in December.     She has 1 alcoholic beverage a month.  She denies Tylenol use.  She denies NSAID use.  She denies prior endoscopy.  She states that a diagnosis of colitis was a recent development.     [place] [facility] [place]  Pt insisted on leaving AMA, reasons she reported for doing so included that she wanted to have a cigarette, she wanted to see her dog, she was planning a trip to [place], and she \"wanted to feel normal for a day\". Attending, RN, charge RN, and hepatologist all had extensive conversations with the pt and her 2 daughters at the bedside to explain the dangers of leaving AMA, with outcomes potentially including worsening acute illness and potentially death. Pt and daughters all expressed understand of the gravity of these outcomes. Daughters at bedside pleaded unsuccessfully with the pt to remain admitted for urgent workup and treatment. Treatment team explained to the pt that in the future we are more than happy to treat her if she changes her mind and represents or requires transfer from another hospital in the near future for higher level care. Pt signed out AMA and left the hospital     [facility] [place] normal ceruloplasmin at 39  CA 19-9 was found to be elevated to 237  GGT was elevated above 1400  CEA was elevated to 3.3      Review of Systems:  Review of Systems   Constitutional: Negative for chills and fever.   HENT: Negative for sinus pain and sore throat.    Eyes: Negative for pain, discharge and redness.   Respiratory: Negative for cough, hemoptysis, wheezing and stridor.    Cardiovascular: Negative for chest pain and palpitations.   Gastrointestinal: Positive for abdominal pain. Negative for nausea and vomiting.   Skin: Positive for itching.         Hospital Medications:  Current Facility-Administered Medications   Medication Dose Frequency Provider Last Rate Last Admin   • polyethylene glycol/lytes (MIRALAX) PACKET 1 Packet  1 Packet BID Nikunj Lenz M.D.   1 " Packet at 09/29/21 1604    And   • magnesium hydroxide (MILK OF MAGNESIA) suspension 30 mL  30 mL QDAY PRN Nikunj Lenz M.D.        And   • bisacodyl (DULCOLAX) suppository 10 mg  10 mg QDAY PRN Nikunj Lenz M.D.       • vitamin D3 (cholecalciferol) tablet 1,000 Units  1,000 Units DAILY Michael Rico M.D.   1,000 Units at 09/30/21 0545   • multivitamin (THERAGRAN) tablet 1 Tablet  1 Tablet DAILY Michael Rico M.D.   1 Tablet at 09/30/21 0546   • Pharmacy Consult Request ...Pain Management Review 1 Each  1 Each PHARMACY TO DOSE Francesca Cummings M.D.       • oxyCODONE immediate-release (ROXICODONE) tablet 5 mg  5 mg Q3HRS PRN Francesca Cummings M.D.        Or   • oxyCODONE immediate release (ROXICODONE) tablet 10 mg  10 mg Q3HRS PRN Francesca Cummings M.D.   10 mg at 09/29/21 0215    Or   • HYDROmorphone (Dilaudid) injection 0.5 mg  0.5 mg Q3HRS PRN Francesca Cummings M.D.   0.5 mg at 09/29/21 0339   • cloNIDine (CATAPRES) tablet 0.1 mg  0.1 mg Q6HRS PRN Francesca Cummings M.D.       • enalaprilat (Vasotec) injection 1.25 mg 1 mL  1.25 mg Q6HRS PRN Francesca Cummings M.D.       • labetalol (NORMODYNE/TRANDATE) injection 10 mg  10 mg Q4HRS PRN Francesca Cummings M.D.       • ondansetron (ZOFRAN) syringe/vial injection 4 mg  4 mg Q4HRS PRN Francesca Cummings M.D.   4 mg at 09/28/21 1331   • ondansetron (ZOFRAN ODT) dispertab 4 mg  4 mg Q4HRS PRN Francesca Cummings M.D.       • promethazine (PHENERGAN) tablet 12.5-25 mg  12.5-25 mg Q4HRS PRN Francesca Cummings M.D.       • promethazine (PHENERGAN) suppository 12.5-25 mg  12.5-25 mg Q4HRS PRN Francesca Cummings M.D.       • prochlorperazine (COMPAZINE) injection 5-10 mg  5-10 mg Q4HRS PRN Francesca Cummings M.D.       • insulin regular (HumuLIN R,NovoLIN R) injection  1-6 Units Q6HRS Francesca Cummings M.D.   1 Units at 09/30/21 1142    And   • glucose 4 g chewable tablet 16 g  16 g Q15 MIN PRN Francesca Cummings M.D.        And   • dextrose 50% (D50W) injection 50 mL  50 mL Q15 MIN PRN Francesca Cummings  "M.D.       • amLODIPine (NORVASC) tablet 5 mg  5 mg QHS Francesca Cummings M.D.   5 mg at 09/29/21 2013   • sertraline (Zoloft) tablet 100 mg  100 mg DAILY Francesca Cummings M.D.   100 mg at 09/30/21 0545   • metoprolol SR (TOPROL XL) tablet 200 mg  200 mg DAILY Francesca Cummings M.D.   200 mg at 09/30/21 0545   • levothyroxine (SYNTHROID) tablet 200 mcg  200 mcg QDAY Francesca Cummings M.D.   200 mcg at 09/29/21 2333   • levothyroxine (SYNTHROID) tablet 25 mcg  25 mcg AM ES Francesca Cummings M.D.   25 mcg at 09/30/21 0545   • insulin glargine (Semglee) injection  25 Units Q EVENING Francesca Cummings M.D.   25 Units at 09/29/21 1718   • LORazepam (ATIVAN) tablet 0.5 mg  0.5 mg Q4HRS PRN Francesca Cummings M.D.   0.5 mg at 09/29/21 2013   • nicotine (NICODERM) 14 MG/24HR 14 mg  1 Patch DAILY Francesca Cummings M.D.   14 mg at 09/30/21 0545   • cyanocobalamin (VITAMIN B-12) tablet 1,000 mcg  1,000 mcg DAILY Francesca Cummings M.D.   1,000 mcg at 09/30/21 0545   • omeprazole (PRILOSEC) capsule 40 mg  40 mg BID Francesca Cummings M.D.   40 mg at 09/30/21 0545   Last reviewed on 9/28/2021  7:56 AM by Stone Brady        Vital signs:  Weight/BMI: Body mass index is 22.17 kg/m².  /68   Pulse 87   Temp 36.9 °C (98.4 °F) (Oral)   Resp 18   Ht 1.676 m (5' 6\")   Wt 62.3 kg (137 lb 5.6 oz)   SpO2 94%   Vitals:    09/29/21 1700 09/29/21 2149 09/30/21 0431 09/30/21 0719   BP: 151/69 119/97 142/68    Pulse: 89 80 87    Resp: 17 17 18    Temp: 36.9 °C (98.4 °F) 37.1 °C (98.8 °F) 36.9 °C (98.4 °F)    TempSrc: Temporal Oral Oral    SpO2: 91% 91% 90% 94%   Weight:       Height:         Oxygen Therapy:  Pulse Oximetry: 94 %, O2 (LPM): 0, O2 Delivery Device: None - Room Air    Intake/Output Summary (Last 24 hours) at 9/30/2021 1348  Last data filed at 9/29/2021 2200  Gross per 24 hour   Intake 820 ml   Output 300 ml   Net 520 ml         Physical Exam:  Physical Exam  Vitals and nursing note reviewed.   Constitutional:       General: She is " not in acute distress.     Appearance: Normal appearance.   HENT:      Head: Normocephalic and atraumatic.      Right Ear: External ear normal.      Left Ear: External ear normal.      Nose: Nose normal.      Mouth/Throat:      Mouth: Mucous membranes are moist.      Pharynx: Oropharynx is clear.   Eyes:      General: Scleral icterus present.      Extraocular Movements: Extraocular movements intact.      Pupils: Pupils are equal, round, and reactive to light.   Cardiovascular:      Rate and Rhythm: Normal rate and regular rhythm.      Pulses: Normal pulses.      Heart sounds: Normal heart sounds. No murmur heard.     Pulmonary:      Effort: Pulmonary effort is normal. No respiratory distress.      Breath sounds: Normal breath sounds.   Abdominal:      General: Abdomen is flat. Bowel sounds are normal. There is no distension.      Palpations: Abdomen is soft.   Musculoskeletal:      Cervical back: Neck supple.      Right lower leg: No edema.      Left lower leg: No edema.   Lymphadenopathy:      Cervical: No cervical adenopathy.   Skin:     General: Skin is warm.      Coloration: Skin is jaundiced.   Neurological:      General: No focal deficit present.      Mental Status: She is oriented to person, place, and time.   Psychiatric:         Thought Content: Thought content normal.         Judgment: Judgment normal.         Labs:  Recent Labs     09/28/21 0012 09/29/21 0442 09/30/21  0505   SODIUM 132* 134* 137   POTASSIUM 3.8 3.9 3.7   CHLORIDE 103 104 103   CO2 20 22 22   BUN 12 14 13   CREATININE 0.62 0.67 0.41*   CALCIUM 8.2* 8.4 8.4     Recent Labs     09/28/21 0012 09/29/21 0442 09/30/21  0505   ALTSGPT 172* 156* 152*   ASTSGOT 175* 165* 188*   ALKPHOSPHAT 2183* 2130* 2331*   TBILIRUBIN 11.2* 9.9* 10.2*   LIPASE 23  --   --    GLUCOSE 411* 166* 42*     Recent Labs     09/28/21 0012 09/29/21 0442 09/30/21  0505   WBC 9.6 9.4 11.1*   NEUTSPOLYS 70.10  --  69.20   LYMPHOCYTES 13.40*  --  14.10*   MONOCYTES  12.00  --  11.90   EOSINOPHILS 2.90  --  3.20   BASOPHILS 1.00  --  1.00   ASTSGOT 175* 165* 188*   ALTSGPT 172* 156* 152*   ALKPHOSPHAT 2183* 2130* 2331*   TBILIRUBIN 11.2* 9.9* 10.2*     Recent Labs     09/28/21  0012 09/28/21  0051 09/29/21  0442 09/30/21  0505   RBC 2.71*  --  2.65* 2.59*   HEMOGLOBIN 8.6*  --  8.4* 8.2*   HEMATOCRIT 27.5*  --  26.5* 25.6*   PLATELETCT 342  --  372 384   PROTHROMBTM  --  12.8  --   --    INR  --  1.04  --   --      Recent Results (from the past 24 hour(s))   Comp Metabolic Panel    Collection Time: 09/30/21  5:05 AM   Result Value Ref Range    Sodium 137 135 - 145 mmol/L    Potassium 3.7 3.6 - 5.5 mmol/L    Chloride 103 96 - 112 mmol/L    Co2 22 20 - 33 mmol/L    Anion Gap 12.0 7.0 - 16.0    Glucose 42 (LL) 65 - 99 mg/dL    Bun 13 8 - 22 mg/dL    Creatinine 0.41 (L) 0.50 - 1.40 mg/dL    Calcium 8.4 8.4 - 10.2 mg/dL    AST(SGOT) 188 (H) 12 - 45 U/L    ALT(SGPT) 152 (H) 2 - 50 U/L    Alkaline Phosphatase 2331 (H) 30 - 99 U/L    Total Bilirubin 10.2 (H) 0.1 - 1.5 mg/dL    Albumin 2.5 (L) 3.2 - 4.9 g/dL    Total Protein 5.7 (L) 6.0 - 8.2 g/dL    Globulin 3.2 1.9 - 3.5 g/dL    A-G Ratio 0.8 g/dL   CBC WITH DIFFERENTIAL    Collection Time: 09/30/21  5:05 AM   Result Value Ref Range    WBC 11.1 (H) 4.8 - 10.8 K/uL    RBC 2.59 (L) 4.20 - 5.40 M/uL    Hemoglobin 8.2 (L) 12.0 - 16.0 g/dL    Hematocrit 25.6 (L) 37.0 - 47.0 %    MCV 98.8 (H) 81.4 - 97.8 fL    MCH 31.7 27.0 - 33.0 pg    MCHC 32.0 (L) 33.6 - 35.0 g/dL    RDW 50.2 (H) 35.9 - 50.0 fL    Platelet Count 384 164 - 446 K/uL    MPV 11.5 9.0 - 12.9 fL    Neutrophils-Polys 69.20 44.00 - 72.00 %    Lymphocytes 14.10 (L) 22.00 - 41.00 %    Monocytes 11.90 0.00 - 13.40 %    Eosinophils 3.20 0.00 - 6.90 %    Basophils 1.00 0.00 - 1.80 %    Immature Granulocytes 0.60 0.00 - 0.90 %    Nucleated RBC 0.00 /100 WBC    Neutrophils (Absolute) 7.64 (H) 2.00 - 7.15 K/uL    Lymphs (Absolute) 1.56 1.00 - 4.80 K/uL    Monos (Absolute) 1.32 (H) 0.00 -  0.85 K/uL    Eos (Absolute) 0.35 0.00 - 0.51 K/uL    Baso (Absolute) 0.11 0.00 - 0.12 K/uL    Immature Granulocytes (abs) 0.07 0.00 - 0.11 K/uL    NRBC (Absolute) 0.00 K/uL   ESTIMATED GFR    Collection Time: 09/30/21  5:05 AM   Result Value Ref Range    GFR If African American >60 >60 mL/min/1.73 m 2    GFR If Non African American >60 >60 mL/min/1.73 m 2   POCT glucose device results    Collection Time: 09/30/21  7:15 AM   Result Value Ref Range    Glucose - Accu-Ck 172 (H) 65 - 99 mg/dL   POCT glucose device results    Collection Time: 09/30/21 11:38 AM   Result Value Ref Range    Glucose - Accu-Ck 184 (H) 65 - 99 mg/dL       Radiology Review:  CT-NEEDLE BX-LIVER   Final Result      CT-guided LEFT hepatic parenchymal biopsy.         US-RUQ   Final Result      1.  Gallstones within the gallbladder. The gallbladder is contracted No evidence of biliary ductal dilatation      2.  The liver is echogenic consistent with fatty change versus hepatocellular dysfunction.            MDM (Data Review):   -Records reviewed and summarized in current documentation  -I personally reviewed and interpreted the laboratory results  -I personally reviewed the radiology images      Medical Decision Making, by Problem:  Active Hospital Problems    Diagnosis    • Epigastric pain [R10.13]    • Elevated liver enzymes [R74.8]    • Hyponatremia [E87.1]    • Macrocytic anemia [D53.9]    • CAD S/P percutaneous coronary angioplasty [I25.10, Z98.61]    • Dyslipidemia [E78.5]    • Anxiety [F41.9]    • Hypertension [I10]    • Tobacco abuse [Z72.0]    • Uncontrolled type 1 diabetes mellitus (HCC) [E10.65]    • Hypothyroidism, postsurgical [E89.0]              Assessment/Recommendations:  1. Jaundice  2. Elevated liver enzymes, cholestatic pattern.  She had negative MRCP.  Elevated CA 19-9 is nonspecific and that can be high in any cause of biliary obstruction.  Overall her enzymes are downtrending albeit slowly.  Bilirubin notoriously lacks  improvement in the liver enzymes.  Parts of her liver function are otherwise intact such as her INR. SCARLETT positive, however smooth muscle antibody, antimitochondrial antibody and IgG are normal.  Ceruloplasmin is normal.    Differential diagnosis at this stage encompasses primary sclerosing cholangitis potentially small duct given the normal MRCP; liver biopsy can conceivably capture this but however since liver biopsy is a focal sample it can miss afflicted areas.  Can consider cholangiocarcinoma although MRCP is quite sensitive for this.  Patient does not complain of diarrhea, however previous CT scan demonstrated thickening throughout portions of the colon.  CRP is elevated on 9/22/2021.  We will recheck CRP, ESR, and also run a fecal calprotectin as cholestatic liver disease at times can be associated with IBD.     CMV negative.  EBV shows previous infection, but no current active infection.  Varicella normal.  Hep C negative.  ESR and CRP are elevated    September 30: I discussed this case with the reading pathologist.  Ultimately there is no obvious clear diagnosis at this stage.  Some mild portal inflammation is seen as well as macrosteatosis.  However there is no bridging.  There is also no evidence of granulomas or other signs of PSC or PBC.  The sample is being sent to [facility] for analysis and definitive result may not arrive for 1 to 2 weeks.    I discussed with the patient empirically starting ursodiol for presumed PBC.  It would not change the course of PSC.    -Follow-up liver soluble antigen and free texted miscellaneous test anti-LK M1 antibodies  -Follow-up IgG4--> normal  -fecal calprotectin  -Trial ursodiol cumulative dose of 15 mg/kg/day     3.  Epigastric pain: Appears to be separate from her liver injury given the timing of symptoms.     -Recommend PPI twice a day     4.  Elevated CEA: Also nonspecific.  Can be elevated in tobacco users        5.  Chronic tobacco use       cessation        I spent more than 55 minutes in assessment, management, and care with the patient and/or family.    Nikunj Lenz MD, [facility]  [facility]    Addendum: I was notified that the patient wished to go home.  I saw the patient again and gave her updates about her path results and the expected turnaround time for results.  She is correct in that we are mainly waiting on tasks and monitoring labs.  She noted that she has some family obligations at home.  She is able to drive herself.  She has 2 brothers in town.  She also describes having an alert system on a necklace she wears.  She is also willing to get labs every day.  In light of these elements,    She may discharge with strict ER precautions.  For example we discussed fevers greater than 100.4.  She can get outpatient labs tomorrow and even Saturday.  She states she may see her PCP next week.  She can continue ursodiol as a trial for 2 to 4 weeks.

## 2021-09-30 NOTE — RESPIRATORY CARE
"   COPD EDUCATION by COPD CLINICAL EDUCATOR  9/30/2021 at 10:11 AM by Philly Baumann, RRT     Patient reviewed by COPD education team. Patient does not have a history or diagnosis of COPD. Patient is a smoker, smoking cessation education done. A comprehensive packet with tips to quit and information on outpatient classes given to patient.     Smoking Cessation Intervention and education completed, 15 minutes spent on smoking cessation education with patient.  Provided smoking cessation packet with \"Tips to Quit\" and brochure for \"Free Smoking Cessation Classes\".       COPD Screen  COPD Risk Screening  Do you have a history of COPD?: No    COPD Assessment  COPD Clinical Specialists ONLY  COPD Education Initiated: Yes--Short Intervention (Smoking Cessation Education provided)  Referrals Initiated:  (Encourage Smoking Cessation on line classes)  $ Smoking Cessation >10 Minutes: Symptomatic  Is this a COPD exacerbation patient?: No    Meds to Beds        MY COPD ACTION PLAN     It is recommended that patients and physicians /healthcare providers complete this action plan together. This plan should be discussed at each physician visit and updated as needed.    The green, yellow and red zones show groups of symptoms of COPD. This list of symptoms is not comprehensive, and you may experience other symptoms. In the \"Actions\" column, your healthcare provider has recommended actions for you to take based on your symptoms.    Patient Name: Anu Manuel   YOB: 1955   Last Updated on:     Green Zone:  I am doing well today Actions   •  Usual activitiy and exercise level •  Take daily medications   •  Usual amounts of cough and phlegm/mucus •  Use oxygen as prescribed   •  Sleep well at night •  Continue regular exercise/diet plan   •  Appetite is good •  At all times avoid cigarette smoke, inhaled irritants     Daily Medications (these medications are taken every day):                Yellow Zone:  I " "am having a bad day or a COPD flare Actions   •  More breathless than usual •  Continue daily medications   •  I have less energy for my daily activities •  Use quick relief inhaler as ordered   •  Increased or thicker phlegm/mucus •  Use oxygen as prescribed   •  Using quick relief inhaler/nebulizer more often •  Get plenty of rest   •  Swelling of ankles more than usual •  Use pursed lip breathing   •  More coughing than usual •  At all times avoid cigarette smoke, inhaled irritants   •  I feel like I have a \"chest cold\"   •  Poor sleep and my symptoms woke me up   •  My appetite is not good   •  My medicine is not helping    •  Call provider immediately if symptoms don’t improve     Continue daily medications, add rescue medications:               Medications to be used during a flare up, (as Discussed with Provider):              Red Zone:  I need urgent medical care Actions   •  Severe shortness of breath even at rest •  Call 911 or seek medical care immediately   •  Not able to do any activity because of breathing    •  Fever or shaking chills    •  Feeling confused or very drowsy     •  Chest pains    •  Coughing up blood              "

## 2021-10-01 ENCOUNTER — HOSPITAL ENCOUNTER (OUTPATIENT)
Dept: LAB | Facility: MEDICAL CENTER | Age: 66
End: 2021-10-01
Attending: INTERNAL MEDICINE
Payer: MEDICARE

## 2021-10-01 ENCOUNTER — TELEPHONE (OUTPATIENT)
Dept: HEALTH INFORMATION MANAGEMENT | Facility: OTHER | Age: 66
End: 2021-10-01

## 2021-10-01 DIAGNOSIS — K72.00 ACUTE LIVER FAILURE WITHOUT HEPATIC COMA: ICD-10-CM

## 2021-10-01 DIAGNOSIS — R17 JAUNDICE: ICD-10-CM

## 2021-10-01 LAB
ALBUMIN SERPL BCP-MCNC: 3.1 G/DL (ref 3.2–4.9)
ALBUMIN/GLOB SERPL: 0.9 G/DL
ALP SERPL-CCNC: 2208 U/L (ref 30–99)
ALT SERPL-CCNC: 152 U/L (ref 2–50)
ANA INTERPRETIVE COMMENT Q5143: ABNORMAL
ANA PATTERN Q5144: ABNORMAL
ANA TITER Q5145: ABNORMAL
ANION GAP SERPL CALC-SCNC: 14 MMOL/L (ref 7–16)
ANTINUCLEAR ANTIBODY (ANA), HEP-2, IGG Q5142: DETECTED
AST SERPL-CCNC: 180 U/L (ref 12–45)
BILIRUB SERPL-MCNC: 10.9 MG/DL (ref 0.1–1.5)
BUN SERPL-MCNC: 16 MG/DL (ref 8–22)
CALCIUM SERPL-MCNC: 8.5 MG/DL (ref 8.5–10.5)
CHLORIDE SERPL-SCNC: 99 MMOL/L (ref 96–112)
CO2 SERPL-SCNC: 20 MMOL/L (ref 20–33)
CREAT SERPL-MCNC: 0.55 MG/DL (ref 0.5–1.4)
GLOBULIN SER CALC-MCNC: 3.3 G/DL (ref 1.9–3.5)
GLUCOSE SERPL-MCNC: 260 MG/DL (ref 65–99)
POTASSIUM SERPL-SCNC: 3.7 MMOL/L (ref 3.6–5.5)
PROT SERPL-MCNC: 6.4 G/DL (ref 6–8.2)
SODIUM SERPL-SCNC: 133 MMOL/L (ref 135–145)

## 2021-10-01 PROCEDURE — 36415 COLL VENOUS BLD VENIPUNCTURE: CPT

## 2021-10-01 PROCEDURE — 80053 COMPREHEN METABOLIC PANEL: CPT

## 2021-10-01 NOTE — DISCHARGE INSTRUCTIONS
Ursodeoxycholic Acid, Ursodiol capsules or tablets  What is this medicine?  URSODIOL (ER lyle dye ol) helps dissolve gallstones in patients who cannot have or who do not need gallbladder surgery. This medicine is also useful for certain liver diseases of adults, children and infants.  This medicine may be used for other purposes; ask your health care provider or pharmacist if you have questions.  COMMON BRAND NAME(S): Actigall, Wilda 250, Wilda Forte  What should I tell my health care provider before I take this medicine?  They need to know if you have any of these conditions:  · blocked bile duct or fistula  · pancreatitis  · an unusual or allergic reaction to ursodiol, bile acids, other medicines, foods, dyes, or preservatives  · pregnant or trying to get pregnant  · breast-feeding  How should I use this medicine?  Take this medicine by mouth with a glass of water. Follow the directions on the prescription label. Take your doses at regular intervals. Do not take your medicine more often than directed. Do not stop taking except on the advice of your doctor or health care professional.  Talk to your pediatrician regarding the use of this medicine in children. Special care may be needed.  Overdosage: If you think you have taken too much of this medicine contact a poison control center or emergency room at once.  NOTE: This medicine is only for you. Do not share this medicine with others.  What if I miss a dose?  If you miss a dose, take it as soon as you can. If it is almost time for your next dose, take only that dose. Do not take double or extra doses.  What may interact with this medicine?  · antacids  · cholestyramine  · clofibrate, fenofibrate, or gemfibrozil  · colesevelam  · colestipol  · female hormones, including estrogens or birth control pills  This list may not describe all possible interactions. Give your health care provider a list of all the medicines, herbs, non-prescription drugs, or dietary supplements  you use. Also tell them if you smoke, drink alcohol, or use illegal drugs. Some items may interact with your medicine.  What should I watch for while using this medicine?  Visit your doctor or health care professional for regular checks on your progress. It may take months of therapy to get the right response. Your doctor or health care professional will schedule tests to see if your gallstones are dissolving or if your liver problem is improving. You may also need blood work done while you are taking this medicine to check that your liver is working properly. Report continued or worsened nausea, vomiting, or abdominal pain to your doctor.  Antacids may interfere with the absorption of this medicine. Take this medicine at least 1 hour before or 2 hours after an antacid dose.  What side effects may I notice from receiving this medicine?  Side effects that you should report to your doctor or health care professional as soon as possible:  · allergic reactions like skin rash, itching or hives, swelling of the face, lips, or tongue  · severe stomach area pain, especially toward your right side  Side effects that usually do not require medical attention (report to your doctor or health care professional if they continue or are bothersome):  · constipation  · gas  · indigestion  · nausea  This list may not describe all possible side effects. Call your doctor for medical advice about side effects. You may report side effects to FDA at 8-046-FDA-5304.  Where should I keep my medicine?  Keep out of the reach of children.  Store at room temperature between 15 and 30 degrees C (59 and 86 degrees F). Keep container tightly closed. Throw away any unused medicine after the expiration date.  NOTE: This sheet is a summary. It may not cover all possible information. If you have questions about this medicine, talk to your doctor, pharmacist, or health care provider.  © 2020 Elsevier/Gold Standard (2013-07-15 10:26:07)    Discharge  Instructions    Discharged to home by car with relative. Discharged via wheelchair, hospital escort: Yes.  Special equipment needed: Not Applicable    Be sure to schedule a follow-up appointment with your primary care doctor or any specialists as instructed.     Discharge Plan:        I understand that a diet low in cholesterol, fat, and sodium is recommended for good health. Unless I have been given specific instructions below for another diet, I accept this instruction as my diet prescription.   Other diet: resume home diet    Special Instructions: None    · Is patient discharged on Warfarin / Coumadin?   No     Depression / Suicide Risk    As you are discharged from this [facility] facility, it is important to learn how to keep safe from harming yourself.    Recognize the warning signs:  · Abrupt changes in personality, positive or negative- including increase in energy   · Giving away possessions  · Change in eating patterns- significant weight changes-  positive or negative  · Change in sleeping patterns- unable to sleep or sleeping all the time   · Unwillingness or inability to communicate  · Depression  · Unusual sadness, discouragement and loneliness  · Talk of wanting to die  · Neglect of personal appearance   · Rebelliousness- reckless behavior  · Withdrawal from people/activities they love  · Confusion- inability to concentrate     If you or a loved one observes any of these behaviors or has concerns about self-harm, here's what you can do:  · Talk about it- your feelings and reasons for harming yourself  · Remove any means that you might use to hurt yourself (examples: pills, rope, extension cords, firearm)  · Get professional help from the community (Mental Health, Substance Abuse, psychological counseling)  · Do not be alone:Call your Safe Contact- someone whom you trust who will be there for you.  · Call your local CRISIS HOTLINE 087-1474 or 208-185-6780  · Call your local Children's Mobile Crisis  Response Team [facility] (538) 073-0563 or [email or web address].frooly  · Call the toll free National Suicide Prevention Hotlines   · National Suicide Prevention Lifeline 884-531-BUYA (3238)  · National Hope Line Network 800-SUICIDE (983-9383)

## 2021-10-01 NOTE — DISCHARGE SUMMARY
"Discharge Summary    CHIEF COMPLAINT ON ADMISSION  Chief Complaint   Patient presents with   • Abdominal Pain     Patient MEGA OBRIEN from home with upper abd pain. Patient is newly Dx liver failure, went to [place] today to see specialist but they gave away her bed so she returned home.    • Hyperglycemia     DM type 1, BG by  after 1.5L IV NS.       Reason for Admission  EMS     Admission Date  9/27/2021    CODE STATUS  Full Code    HPI & HOSPITAL COURSE  Per notes, \"66 y.o. female who presented 9/27/2021 with abdominal pain. She has a history of DM I, CAD (MINDY x 2 in August of 2020),  HTN and hypothyroidism. She was admitted to [facility] on 9/23 with new onset hepatitis, and was seen by Dr Zavala during that hospitalization - he recommended transfer to [facility] for Hepatology at that time.  [facility] transport was 1-3 days out, so pt decided to have her family transport her; however, she did not arrive at [facility] at her slotted time, and they gave away her bed. She was then admitted via the ER at [facility] but left AMA as she felt that she did not like the way they were treating her and she missed her dog.      She returned here with epigastric abdominal pain; during last admission she had diarrhea with question of IBD on imaging, but she states she has not had diarrhea since discharge on the 26th from here, and no melena or hematochezia. She denies EtOH or tylenol ingestion. The ER doc attempted to transfer pt back to [facility] for Hepatology again, but they stated they would not accept her without a liver biopsy from our facility. Workup at [facility] included an elevated GGT, , mildly elevated CEA.\"      Patient was admitted and underwent a liver biopsy on 9/27.  Initial biopsy results were inconclusive and have been sent on to [facility] for further analysis, will likely take 1 to 2 weeks to get back..  She was evaluated by gastroenterology who recommended monitoring of liver enzymes.  " Gastroenterology recommended adding ursodiol in case this is AMA negative PBC.  She will need to follow-up closely with gastroenterology in the outpatient setting.  Additionally she will need to have labs drawn on 10/1 and 10/2 follow-up with gastroenterology for this.    Update 10/26/21: [place] [facility] pathology results revealed: bile duct obstruction, cholestatic injury, mild macrovesicular steatosis    Therefore, she is discharged in fair and stable condition to home with close outpatient follow-up.    The patient met 2-midnight criteria for an inpatient stay at the time of discharge.    Discharge Date  9/30/2021    FOLLOW UP ITEMS POST DISCHARGE  Follow-up with gastroenterology  Follow-up with PCP    DISCHARGE DIAGNOSES  Principal Problem:    Elevated liver enzymes POA: Yes  Active Problems:    Hypothyroidism, postsurgical POA: Yes    Uncontrolled type 1 diabetes mellitus (HCC) POA: Yes      Overview: ICD-10 transition    Tobacco abuse POA: Yes    Hypertension POA: Yes    Anxiety POA: Yes    Dyslipidemia POA: Yes    CAD S/P percutaneous coronary angioplasty POA: Yes    Macrocytic anemia POA: Yes    Hyponatremia POA: Yes    Epigastric pain POA: Unknown  Resolved Problems:    Type 1 diabetes mellitus with kidney complication (HCC) POA: Yes    Type 1 diabetes mellitus with neurological manifestations, uncontrolled (HCC) POA: Yes      Overview: ICD-10 transition      FOLLOW UP  No future appointments.  No follow-up provider specified.    MEDICATIONS ON DISCHARGE     Medication List      START taking these medications      Instructions   ursodiol 300 MG Caps  Commonly known as: ACTIGALL   Take 1 Capsule by mouth 3 times a day for 30 days.  Dose: 300 mg        CONTINUE taking these medications      Instructions   ALPRAZolam 0.5 MG Tabs  Commonly known as: XANAX   Take 0.5 mg by mouth at bedtime.  Dose: 0.5 mg     amLODIPine 10 MG Tabs  Commonly known as: NORVASC   Take 5 mg by mouth every evening. N THE EVENING  Dose: 5  mg     aspirin 81 MG EC tablet   Take 1 Tab by mouth every morning.  Dose: 81 mg     B-12 PO   Take 1 Tablet by mouth every day.  Dose: 1 Tablet     B-6 PO   Take 1 Tablet by mouth every day.  Dose: 1 Tablet     clopidogrel 75 MG Tabs  Commonly known as: PLAVIX   Take 75 mg by mouth every day.  Dose: 75 mg     D3 PO   Take 1 Capsule by mouth every day.  Dose: 1 Capsule     FreeStyle John 14 Day Sensor Misc   1 MISCELLANEOUS E10.42 CHANGE SENSOR EVERY 14 DAYS   E11.65     metoprolol 200 MG XL tablet  Commonly known as: TOPROL-XL   Take 1 tablet by mouth every day.  Dose: 200 mg     NovoLOG 100 UNIT/ML Soln  Generic drug: insulin aspart   Inject 1-5 Units under the skin 3 times a day before meals. 1 units for every 5 g of carbs   AND  Sliding Scale   150 - 200 = 1 units  201 - 250 = 2 units  251 - 300 = 3 units  301 - 350 = 4 units  351 - 400 = 5 units  Dose: 1-5 Units     sertraline 100 MG Tabs  Commonly known as: Zoloft   Take 100 mg by mouth every day.  Dose: 100 mg     Synthroid 200 MCG Tabs  Generic drug: levothyroxine   Take 200 mcg by mouth every day.  Dose: 200 mcg     Tresiba FlexTouch 100 UNIT/ML Sopn  Generic drug: Insulin Degludec   Inject 30 Units under the skin every evening.  Dose: 30 Units     VITAMIN C PO   Take 1 Tablet by mouth every day.  Dose: 1 Tablet            Allergies  Allergies   Allergen Reactions   • Tape      Blisters, paper tape is ok       DIET  Orders Placed This Encounter   Procedures   • Diet Order Diet: 2 Gram Sodium     Standing Status:   Standing     Number of Occurrences:   1     Order Specific Question:   Diet:     Answer:   2 Gram Sodium [7]       ACTIVITY  As tolerated.  Weight bearing as tolerated    CONSULTATIONS  Gastroenterology    PROCEDURES  Liver biopsy    LABORATORY  Lab Results   Component Value Date    SODIUM 137 09/30/2021    POTASSIUM 3.7 09/30/2021    CHLORIDE 103 09/30/2021    CO2 22 09/30/2021    GLUCOSE 42 (LL) 09/30/2021    BUN 13 09/30/2021    CREATININE  0.41 (L) 09/30/2021    CREATININE 1.0 01/08/2009        Lab Results   Component Value Date    WBC 11.1 (H) 09/30/2021    HEMOGLOBIN 8.2 (L) 09/30/2021    HEMATOCRIT 25.6 (L) 09/30/2021    PLATELETCT 384 09/30/2021        Total time of the discharge process exceeds 45 minutes.

## 2021-10-01 NOTE — PROGRESS NOTES
Discharge paperwork reviewed with patient and relative at bedside. Copy given. Questions encouraged and answered. IV removed.  Patient escorted to ED entrance via wheelchair. Patient discharged to home.

## 2021-10-01 NOTE — TELEPHONE ENCOUNTER
Outreach call to mbr to follow up on hospital discharge, follow up care and [facility] benefits. She reported she was doing okay, feeling tired. She reported did go get her mail today and did follow up with obtaining her blood work/labs. Reported she will be going back tomorrow for her repeat blood work. Discussed [facility] benefits, mbr declined at this time but agreeable to follow up with LSW if she changes her mind. She reported she believes she does have an appt with DANNY Monaco but plans to call office to confirm. Also discussed pt reaching out to GI to schedule appt. She voiced agreement. Provided pt with LSW contact information and encouraged her to reach out if there are any additional needs.

## 2021-10-02 LAB
CALPROTECTIN STL-MCNT: 7 UG/G
DSDNA AB TITR SER CLIF: 13 IU (ref 0–24)

## 2021-10-04 ENCOUNTER — APPOINTMENT (OUTPATIENT)
Dept: RADIOLOGY | Facility: MEDICAL CENTER | Age: 66
End: 2021-10-04
Attending: EMERGENCY MEDICINE
Payer: MEDICARE

## 2021-10-04 ENCOUNTER — HOSPITAL ENCOUNTER (EMERGENCY)
Facility: MEDICAL CENTER | Age: 66
End: 2021-10-04
Attending: EMERGENCY MEDICINE
Payer: MEDICARE

## 2021-10-04 VITALS
OXYGEN SATURATION: 96 % | RESPIRATION RATE: 17 BRPM | HEART RATE: 89 BPM | BODY MASS INDEX: 22.11 KG/M2 | WEIGHT: 137 LBS | TEMPERATURE: 97.3 F | DIASTOLIC BLOOD PRESSURE: 85 MMHG | SYSTOLIC BLOOD PRESSURE: 114 MMHG

## 2021-10-04 DIAGNOSIS — R10.11 RIGHT UPPER QUADRANT ABDOMINAL PAIN: ICD-10-CM

## 2021-10-04 LAB
ALBUMIN SERPL BCP-MCNC: 2.7 G/DL (ref 3.2–4.9)
ALBUMIN/GLOB SERPL: 0.8 G/DL
ALP SERPL-CCNC: 2019 U/L (ref 30–99)
ALT SERPL-CCNC: 127 U/L (ref 2–50)
ANION GAP SERPL CALC-SCNC: 17 MMOL/L (ref 7–16)
APTT PPP: 24.7 SEC (ref 24.7–36)
AST SERPL-CCNC: 166 U/L (ref 12–45)
BASOPHILS # BLD AUTO: 1 % (ref 0–1.8)
BASOPHILS # BLD: 0.11 K/UL (ref 0–0.12)
BILIRUB SERPL-MCNC: 8.5 MG/DL (ref 0.1–1.5)
BUN SERPL-MCNC: 17 MG/DL (ref 8–22)
CALCIUM SERPL-MCNC: 8.7 MG/DL (ref 8.4–10.2)
CHLORIDE SERPL-SCNC: 98 MMOL/L (ref 96–112)
CO2 SERPL-SCNC: 20 MMOL/L (ref 20–33)
CREAT SERPL-MCNC: 0.51 MG/DL (ref 0.5–1.4)
ENA JO1 AB TITR SER: 0 AU/ML (ref 0–40)
ENA SCL70 IGG SER QL: 0 AU/ML (ref 0–40)
ENA SM IGG SER-ACNC: 2 AU/ML (ref 0–40)
ENA SS-B IGG SER IA-ACNC: 0 AU/ML (ref 0–40)
EOSINOPHIL # BLD AUTO: 0.22 K/UL (ref 0–0.51)
EOSINOPHIL NFR BLD: 2 % (ref 0–6.9)
ERYTHROCYTE [DISTWIDTH] IN BLOOD BY AUTOMATED COUNT: 59.6 FL (ref 35.9–50)
GLOBULIN SER CALC-MCNC: 3.5 G/DL (ref 1.9–3.5)
GLUCOSE SERPL-MCNC: 247 MG/DL (ref 65–99)
HCT VFR BLD AUTO: 27.2 % (ref 37–47)
HGB BLD-MCNC: 8.7 G/DL (ref 12–16)
IMM GRANULOCYTES # BLD AUTO: 0.06 K/UL (ref 0–0.11)
IMM GRANULOCYTES NFR BLD AUTO: 0.5 % (ref 0–0.9)
INR PPP: 0.99 (ref 0.87–1.13)
LIPASE SERPL-CCNC: 18 U/L (ref 7–58)
LYMPHOCYTES # BLD AUTO: 1.04 K/UL (ref 1–4.8)
LYMPHOCYTES NFR BLD: 9.4 % (ref 22–41)
MCH RBC QN AUTO: 32.6 PG (ref 27–33)
MCHC RBC AUTO-ENTMCNC: 32 G/DL (ref 33.6–35)
MCV RBC AUTO: 101.9 FL (ref 81.4–97.8)
MONOCYTES # BLD AUTO: 1.24 K/UL (ref 0–0.85)
MONOCYTES NFR BLD AUTO: 11.2 % (ref 0–13.4)
NEUTROPHILS # BLD AUTO: 8.41 K/UL (ref 2–7.15)
NEUTROPHILS NFR BLD: 75.9 % (ref 44–72)
NRBC # BLD AUTO: 0 K/UL
NRBC BLD-RTO: 0 /100 WBC
PLATELET # BLD AUTO: 459 K/UL (ref 164–446)
PMV BLD AUTO: 10.8 FL (ref 9–12.9)
POTASSIUM SERPL-SCNC: 3.9 MMOL/L (ref 3.6–5.5)
PROT SERPL-MCNC: 6.2 G/DL (ref 6–8.2)
PROTHROMBIN TIME: 12.3 SEC (ref 12–14.6)
RBC # BLD AUTO: 2.67 M/UL (ref 4.2–5.4)
SODIUM SERPL-SCNC: 135 MMOL/L (ref 135–145)
SSA52 R0ENA AB IGG Q0420: 2 AU/ML (ref 0–40)
SSA60 R0ENA AB IGG Q0419: 0 AU/ML (ref 0–40)
WBC # BLD AUTO: 11.1 K/UL (ref 4.8–10.8)

## 2021-10-04 PROCEDURE — 96375 TX/PRO/DX INJ NEW DRUG ADDON: CPT

## 2021-10-04 PROCEDURE — 96374 THER/PROPH/DIAG INJ IV PUSH: CPT | Mod: XU

## 2021-10-04 PROCEDURE — 80053 COMPREHEN METABOLIC PANEL: CPT

## 2021-10-04 PROCEDURE — 700111 HCHG RX REV CODE 636 W/ 250 OVERRIDE (IP): Performed by: EMERGENCY MEDICINE

## 2021-10-04 PROCEDURE — 83690 ASSAY OF LIPASE: CPT

## 2021-10-04 PROCEDURE — 74177 CT ABD & PELVIS W/CONTRAST: CPT | Mod: ME

## 2021-10-04 PROCEDURE — 85610 PROTHROMBIN TIME: CPT

## 2021-10-04 PROCEDURE — 700117 HCHG RX CONTRAST REV CODE 255: Performed by: EMERGENCY MEDICINE

## 2021-10-04 PROCEDURE — 99284 EMERGENCY DEPT VISIT MOD MDM: CPT

## 2021-10-04 PROCEDURE — 85730 THROMBOPLASTIN TIME PARTIAL: CPT

## 2021-10-04 PROCEDURE — 36415 COLL VENOUS BLD VENIPUNCTURE: CPT

## 2021-10-04 PROCEDURE — 85025 COMPLETE CBC W/AUTO DIFF WBC: CPT

## 2021-10-04 RX ORDER — ONDANSETRON 2 MG/ML
4 INJECTION INTRAMUSCULAR; INTRAVENOUS ONCE
Status: COMPLETED | OUTPATIENT
Start: 2021-10-04 | End: 2021-10-04

## 2021-10-04 RX ORDER — HYDROCODONE BITARTRATE AND ACETAMINOPHEN 5; 325 MG/1; MG/1
1 TABLET ORAL EVERY 4 HOURS PRN
Qty: 12 TABLET | Refills: 0 | Status: SHIPPED | OUTPATIENT
Start: 2021-10-04 | End: 2021-10-09

## 2021-10-04 RX ORDER — MORPHINE SULFATE 10 MG/ML
6 INJECTION, SOLUTION INTRAMUSCULAR; INTRAVENOUS ONCE
Status: COMPLETED | OUTPATIENT
Start: 2021-10-04 | End: 2021-10-04

## 2021-10-04 RX ADMIN — MORPHINE SULFATE 6 MG: 10 INJECTION INTRAVENOUS at 07:32

## 2021-10-04 RX ADMIN — IOHEXOL 100 ML: 350 INJECTION, SOLUTION INTRAVENOUS at 08:50

## 2021-10-04 RX ADMIN — ONDANSETRON 4 MG: 2 INJECTION INTRAMUSCULAR; INTRAVENOUS at 07:33

## 2021-10-04 ASSESSMENT — LIFESTYLE VARIABLES
DO YOU DRINK ALCOHOL: YES
HAVE YOU EVER FELT YOU SHOULD CUT DOWN ON YOUR DRINKING: NO

## 2021-10-04 ASSESSMENT — FIBROSIS 4 INDEX: FIB4 SCORE: 2.43

## 2021-10-04 NOTE — ED NOTES
Pt received d/c instr; pt verbalized understanding. Pt called daughter for a ride home. Pt amb to lobby s/p d/c

## 2021-10-04 NOTE — ED PROVIDER NOTES
"ED Provider Note    ED Provider    Means of arrival: Ambulance  History obtained from: Patient/EMS  History limited by: None    CHIEF COMPLAINT  Chief Complaint   Patient presents with   • Abdominal Pain       HPI  Anu Manuel is a 66 y.o. female who presents with complaints of abdominal pain in the right to mid upper abdomen.  She has a history of hepatic disease, had a biopsy done on Tuesday of last week, starting Friday she started developing pain which has been progressively worsening.  The pain is in the area described, nonradiating, severe, constant.  She has had no nausea no vomiting no fevers.  She has had diarrhea which has been an ongoing problem for several months, there is no blood in the diarrhea.    She had no pain prior to the biopsy being done.    REVIEW OF SYSTEMS  See HPI for further details. All other systems are negative.     PAST MEDICAL HISTORY   has a past medical history of Adverse effect of anesthesia, Anesthesia, Arthritis, Cigarette smoker (quit 2013), Dental disorder, depression (8/30/2016), Diabetes mellitus type 1 ([facility]) (1989), Encounter for long-term (current) use of insulin ([facility]) (9/25/2013), Heart burn, High cholesterol, Hypertension, Hypothyroidism, postsurgical (1970), Indigestion, Infectious disease, Joint replacement, Pain, Polyneuropathy in diabetes(357.2) (6/10/2015), Status post appendectomy, and 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    SOCIAL HISTORY  Social History     Tobacco Use   • Smoking status: Current Every Day Smoker     Packs/day: 0.50     Years: 30.00     Pack years: 15.00     Types: Cigarettes   • Smokeless tobacco: Never Used   • Tobacco comment: 1 ppd    Vaping Use   • Vaping Use: Never used   Substance and Sexual Activity   • Alcohol use: Yes     Comment:  \"maybe once a month I'll have some wine\".    • Drug use: No   • Sexual activity: Not on file       SURGICAL HISTORY   has a " past surgical history that includes hysterectomy, vaginal (2006); thyroid lobectomy (1973); lumpectomy (1976, 2005); cervical disk and fusion anterior (03/12/08); tonsillectomy (1963); cervical fusion posterior (1/16/2009); hardware removal neuro (1/16/2009); neck exploration (1/16/2009); abdominal hysterectomy total; lumpectomy; lumbar laminectomy diskectomy (Right, 5/10/2016); shoulder decompression arthroscopic (6/17/08); clavicle distal excision (6/17/08); shoulder arthroscopy w/ rotator cuff repair (10/9/08); njx aa&/strd tfrml epi lumbar/sacral 1 level (Right, 8/31/2016); spinal cord stimulator (N/A, 10/26/2018); thoracic laminectomy (N/A, 10/26/2018); appendectomy (2004); implant neurostim/ (N/A, 12/16/2019); irrigation & debridement neuro (1/19/2020); and cath placement (1/25/2020).    CURRENT MEDICATIONS  Home Medications    **Home medications have not yet been reviewed for this encounter**         ALLERGIES  Allergies   Allergen Reactions   • Tape      Blisters, paper tape is ok       PHYSICAL EXAM  VITAL SIGNS: /85   Pulse 89   Temp 36.3 °C (97.3 °F) (Temporal)   Resp 17   Wt 62.1 kg (137 lb)   LMP 04/29/2005 (LMP Unknown)   SpO2 96%   BMI 22.11 kg/m²   Constitutional: Alert in no apparent distress.  HENT: No signs of trauma, Mucous membranes are moist   Eyes:  Conjunctiva normal, there is scleral icterus   neck: Normal range of motion, No tenderness, Supple,  Lymphatic: No lymphadenopathy noted.   Cardiovascular: Regular rate and rhythm, no murmurs.   Thorax & Lungs: Normal breath sounds, No respiratory distress, No wheezing, No chest tenderness.   Abdomen: Bowel sounds normal, Soft there is an OpSite dressing, in place, this was removed and site shows a puncture cora with no signs of swelling, erythema.  There is tenderness to the entire upper abdomen.  There is no guarding rigidity or rebound  Skin: Warm, Dry, mild jaundice  Back: No bony tenderness, No CVA tenderness.    Extremities:No edema, No tenderness, No cyanosis,    Musculoskeletal: Good range of motion in all major joints. No tenderness to palpation or major deformities noted.   Neurologic: Alert ,Oriented x4, Normal motor function, Normal sensory function, No focal deficits noted.   Psychiatric: Affect normal, Judgment normal, Mood normal.       MEDICAL DECISION MAKING  This is a 66 y.o. female who presents with liver disease, she is post biopsy and is having pain.  There is concerns for abscess, bleeding, pain from the procedure itself.  CT be done to evaluate for this.  Lab test were done to evaluate for coagulopathy, and anemia.    DIAGNOSTIC STUDIES / PROCEDURES    EKG      LABS  Results for orders placed or performed during the hospital encounter of 10/04/21   CBC WITH DIFFERENTIAL   Result Value Ref Range    WBC 11.1 (H) 4.8 - 10.8 K/uL    RBC 2.67 (L) 4.20 - 5.40 M/uL    Hemoglobin 8.7 (L) 12.0 - 16.0 g/dL    Hematocrit 27.2 (L) 37.0 - 47.0 %    .9 (H) 81.4 - 97.8 fL    MCH 32.6 27.0 - 33.0 pg    MCHC 32.0 (L) 33.6 - 35.0 g/dL    RDW 59.6 (H) 35.9 - 50.0 fL    Platelet Count 459 (H) 164 - 446 K/uL    MPV 10.8 9.0 - 12.9 fL    Neutrophils-Polys 75.90 (H) 44.00 - 72.00 %    Lymphocytes 9.40 (L) 22.00 - 41.00 %    Monocytes 11.20 0.00 - 13.40 %    Eosinophils 2.00 0.00 - 6.90 %    Basophils 1.00 0.00 - 1.80 %    Immature Granulocytes 0.50 0.00 - 0.90 %    Nucleated RBC 0.00 /100 WBC    Neutrophils (Absolute) 8.41 (H) 2.00 - 7.15 K/uL    Lymphs (Absolute) 1.04 1.00 - 4.80 K/uL    Monos (Absolute) 1.24 (H) 0.00 - 0.85 K/uL    Eos (Absolute) 0.22 0.00 - 0.51 K/uL    Baso (Absolute) 0.11 0.00 - 0.12 K/uL    Immature Granulocytes (abs) 0.06 0.00 - 0.11 K/uL    NRBC (Absolute) 0.00 K/uL   COMP METABOLIC PANEL   Result Value Ref Range    Sodium 135 135 - 145 mmol/L    Potassium 3.9 3.6 - 5.5 mmol/L    Chloride 98 96 - 112 mmol/L    Co2 20 20 - 33 mmol/L    Anion Gap 17.0 (H) 7.0 - 16.0    Glucose 247 (H) 65 - 99 mg/dL     Bun 17 8 - 22 mg/dL    Creatinine 0.51 0.50 - 1.40 mg/dL    Calcium 8.7 8.4 - 10.2 mg/dL    AST(SGOT) 166 (H) 12 - 45 U/L    ALT(SGPT) 127 (H) 2 - 50 U/L    Alkaline Phosphatase 2019 (H) 30 - 99 U/L    Total Bilirubin 8.5 (H) 0.1 - 1.5 mg/dL    Albumin 2.7 (L) 3.2 - 4.9 g/dL    Total Protein 6.2 6.0 - 8.2 g/dL    Globulin 3.5 1.9 - 3.5 g/dL    A-G Ratio 0.8 g/dL   LIPASE   Result Value Ref Range    Lipase 18 7 - 58 U/L   APTT   Result Value Ref Range    APTT 24.7 24.7 - 36.0 sec   PROTHROMBIN TIME (INR)   Result Value Ref Range    PT 12.3 12.0 - 14.6 sec    INR 0.99 0.87 - 1.13   ESTIMATED GFR   Result Value Ref Range    GFR If African American >60 >60 mL/min/1.73 m 2    GFR If Non African American >60 >60 mL/min/1.73 m 2         RADIOLOGY  CT-ABDOMEN-PELVIS WITH   Final Result      1.  Questionable wall thickening in the proximal transverse colon likely represents peristalsis. No abnormality was seen on prior MRI.      2.  Cholelithiasis      3.  Atherosclerosis          COURSE  Pertinent Labs & Imaging studies reviewed. (See chart for details)    7:10 AM - Patient seen and examined at bedside. Discussed plan of care,    Patient has pain at the site of her biopsy, there is no signs of abscess inflammation or bleeding into the area or into the liver.    Her transaminases are little change from previous.  She is medicated for pain with good results should be discharged home with pain medications and to follow-up with GI specialist.  Biopsy has been done, GI specialist will review the biopsy results with her.    Discharged home in stable condition    FINAL IMPRESSION  1. Right upper quadrant abdominal pain        The note accurately reflects work and decisions made by me.  Jong Burrell D.O.  10/4/2021  2:02 PM

## 2021-10-04 NOTE — DISCHARGE INSTRUCTIONS
CT shows no inflammation, infection, or bleeding from the biopsy.  You are being prescribed a pain medication to assist with discomfort.  Please follow-up with your primary doctor and/or GI for your liver disease, and pain.

## 2021-10-04 NOTE — ED TRIAGE NOTES
Pt BIB [facility] with c/o abdominal pain. Pt was dx with liver failure 3 weeks ago. Last week pt had liver biopsy and was discharged Thursday. Pt states Friday started having abdominal pain at the biopsy site. States felt better on Saturday but has just constantly gotten worse.

## 2021-10-05 ENCOUNTER — TELEPHONE (OUTPATIENT)
Dept: HEALTH INFORMATION MANAGEMENT | Facility: OTHER | Age: 66
End: 2021-10-05

## 2021-10-05 ENCOUNTER — PATIENT OUTREACH (OUTPATIENT)
Dept: HEALTH INFORMATION MANAGEMENT | Facility: OTHER | Age: 66
End: 2021-10-05

## 2021-10-05 LAB — U1 SNRNP IGG SER QL: 3

## 2021-10-05 NOTE — TELEPHONE ENCOUNTER
Received incoming VM from mbr. LSW returned call.     Mbr reported she is in sever pain, especially at night. She reported the days she does have pain but it's not as intense as night. She reported she is scared due to the significant pain and isn't sure what to do. Her daughter is out of town on vacation but she does report having another her locally. She reported she was prescribed ursodiol and feels this medications maybe contributing to her pain. She reported she is supposed to take it 3x/day but stopped taking it as of this morning due to concerns it is causing the pain. Mbr is interested in having provider with [facility] come out to see her and requested referral be placed. She did report she is not staying at the address on file in Epic but her daughter's home. Reported she would obtain the address to provide to [facility] when they call to schedule TC appt. LSW sent urgent referral to [facility] given mbr's report and admission to ED yesterday. Encouraged mbr to follow up with LSW if there are any additional concerns.

## 2021-10-05 NOTE — PROGRESS NOTES
10/5/21- CHW contacted pt via TC post d/c from [facility]. Pt states she is in current contact with  Connie Marroquin and has accepted [facility] with her. Pt declined all CCM services/resources at this time. Pt would like to continue to manage their care. CCM contact info left with pt and encouraged Anu to reach out if needed.

## 2021-10-08 ENCOUNTER — HOSPITAL ENCOUNTER (EMERGENCY)
Facility: MEDICAL CENTER | Age: 66
End: 2021-10-09
Attending: EMERGENCY MEDICINE
Payer: MEDICARE

## 2021-10-08 DIAGNOSIS — R10.13 ABDOMINAL PAIN, ACUTE, EPIGASTRIC: ICD-10-CM

## 2021-10-08 DIAGNOSIS — E86.0 DEHYDRATION: ICD-10-CM

## 2021-10-08 DIAGNOSIS — R11.2 NAUSEA AND VOMITING, INTRACTABILITY OF VOMITING NOT SPECIFIED, UNSPECIFIED VOMITING TYPE: ICD-10-CM

## 2021-10-08 DIAGNOSIS — R74.8 ELEVATED ALKALINE PHOSPHATASE LEVEL: ICD-10-CM

## 2021-10-08 DIAGNOSIS — R17 JAUNDICE: ICD-10-CM

## 2021-10-08 PROBLEM — R10.84 GENERALIZED ABDOMINAL PAIN: Status: ACTIVE | Noted: 2021-10-08

## 2021-10-08 LAB
ALBUMIN SERPL BCP-MCNC: 2.5 G/DL (ref 3.2–4.9)
ALBUMIN/GLOB SERPL: 0.7 G/DL
ALP SERPL-CCNC: 2171 U/L (ref 30–99)
ALT SERPL-CCNC: 121 U/L (ref 2–50)
ANION GAP SERPL CALC-SCNC: 9 MMOL/L (ref 7–16)
APPEARANCE UR: ABNORMAL
APTT PPP: 27.5 SEC (ref 24.7–36)
AST SERPL-CCNC: 156 U/L (ref 12–45)
BACTERIA #/AREA URNS HPF: ABNORMAL /HPF
BASOPHILS # BLD AUTO: 0.7 % (ref 0–1.8)
BASOPHILS # BLD: 0.07 K/UL (ref 0–0.12)
BILIRUB SERPL-MCNC: 8.7 MG/DL (ref 0.1–1.5)
BUN SERPL-MCNC: 15 MG/DL (ref 8–22)
CALCIUM SERPL-MCNC: 8.5 MG/DL (ref 8.4–10.2)
CAOX CRY #/AREA URNS HPF: ABNORMAL /HPF
CHLORIDE SERPL-SCNC: 97 MMOL/L (ref 96–112)
CO2 SERPL-SCNC: 25 MMOL/L (ref 20–33)
COLOR UR: ABNORMAL
CREAT SERPL-MCNC: 0.75 MG/DL (ref 0.5–1.4)
EOSINOPHIL # BLD AUTO: 0.15 K/UL (ref 0–0.51)
EOSINOPHIL NFR BLD: 1.6 % (ref 0–6.9)
EPI CELLS #/AREA URNS HPF: ABNORMAL /HPF
ERYTHROCYTE [DISTWIDTH] IN BLOOD BY AUTOMATED COUNT: 63 FL (ref 35.9–50)
GLOBULIN SER CALC-MCNC: 3.6 G/DL (ref 1.9–3.5)
GLUCOSE SERPL-MCNC: 225 MG/DL (ref 65–99)
HCT VFR BLD AUTO: 27.1 % (ref 37–47)
HGB BLD-MCNC: 8.7 G/DL (ref 12–16)
IMM GRANULOCYTES # BLD AUTO: 0.06 K/UL (ref 0–0.11)
IMM GRANULOCYTES NFR BLD AUTO: 0.6 % (ref 0–0.9)
INR PPP: 1.02 (ref 0.87–1.13)
LACTATE BLD-SCNC: 0.9 MMOL/L (ref 0.5–2)
LIPASE SERPL-CCNC: 13 U/L (ref 7–58)
LYMPHOCYTES # BLD AUTO: 0.99 K/UL (ref 1–4.8)
LYMPHOCYTES NFR BLD: 10.3 % (ref 22–41)
MCH RBC QN AUTO: 32.6 PG (ref 27–33)
MCHC RBC AUTO-ENTMCNC: 32.1 G/DL (ref 33.6–35)
MCV RBC AUTO: 101.5 FL (ref 81.4–97.8)
MICRO URNS: ABNORMAL
MONOCYTES # BLD AUTO: 1.13 K/UL (ref 0–0.85)
MONOCYTES NFR BLD AUTO: 11.8 % (ref 0–13.4)
MUCOUS THREADS #/AREA URNS HPF: ABNORMAL /HPF
NEUTROPHILS # BLD AUTO: 7.21 K/UL (ref 2–7.15)
NEUTROPHILS NFR BLD: 75 % (ref 44–72)
NRBC # BLD AUTO: 0 K/UL
NRBC BLD-RTO: 0 /100 WBC
PH UR STRIP.AUTO: ABNORMAL [PH] (ref 5–8)
PLATELET # BLD AUTO: 471 K/UL (ref 164–446)
PMV BLD AUTO: 11 FL (ref 9–12.9)
POTASSIUM SERPL-SCNC: 3.3 MMOL/L (ref 3.6–5.5)
PROT SERPL-MCNC: 6.1 G/DL (ref 6–8.2)
PROTHROMBIN TIME: 12.6 SEC (ref 12–14.6)
RBC # BLD AUTO: 2.67 M/UL (ref 4.2–5.4)
RBC # URNS HPF: ABNORMAL /HPF
SODIUM SERPL-SCNC: 131 MMOL/L (ref 135–145)
SP GR UR STRIP.AUTO: >=1.03
WBC # BLD AUTO: 9.6 K/UL (ref 4.8–10.8)
WBC #/AREA URNS HPF: ABNORMAL /HPF

## 2021-10-08 PROCEDURE — 85730 THROMBOPLASTIN TIME PARTIAL: CPT

## 2021-10-08 PROCEDURE — 700111 HCHG RX REV CODE 636 W/ 250 OVERRIDE (IP): Performed by: EMERGENCY MEDICINE

## 2021-10-08 PROCEDURE — 96374 THER/PROPH/DIAG INJ IV PUSH: CPT

## 2021-10-08 PROCEDURE — 81001 URINALYSIS AUTO W/SCOPE: CPT

## 2021-10-08 PROCEDURE — 85610 PROTHROMBIN TIME: CPT

## 2021-10-08 PROCEDURE — 99285 EMERGENCY DEPT VISIT HI MDM: CPT

## 2021-10-08 PROCEDURE — 83690 ASSAY OF LIPASE: CPT

## 2021-10-08 PROCEDURE — 94760 N-INVAS EAR/PLS OXIMETRY 1: CPT

## 2021-10-08 PROCEDURE — 700105 HCHG RX REV CODE 258: Performed by: EMERGENCY MEDICINE

## 2021-10-08 PROCEDURE — 80053 COMPREHEN METABOLIC PANEL: CPT

## 2021-10-08 PROCEDURE — 85025 COMPLETE CBC W/AUTO DIFF WBC: CPT

## 2021-10-08 PROCEDURE — 83605 ASSAY OF LACTIC ACID: CPT

## 2021-10-08 PROCEDURE — 96375 TX/PRO/DX INJ NEW DRUG ADDON: CPT

## 2021-10-08 RX ORDER — MORPHINE SULFATE 4 MG/ML
4 INJECTION, SOLUTION INTRAMUSCULAR; INTRAVENOUS ONCE
Status: COMPLETED | OUTPATIENT
Start: 2021-10-08 | End: 2021-10-08

## 2021-10-08 RX ORDER — ONDANSETRON 2 MG/ML
4 INJECTION INTRAMUSCULAR; INTRAVENOUS ONCE
Status: COMPLETED | OUTPATIENT
Start: 2021-10-08 | End: 2021-10-08

## 2021-10-08 RX ORDER — MORPHINE SULFATE 10 MG/ML
6 INJECTION, SOLUTION INTRAMUSCULAR; INTRAVENOUS ONCE
Status: DISCONTINUED | OUTPATIENT
Start: 2021-10-08 | End: 2021-10-08

## 2021-10-08 RX ORDER — SODIUM CHLORIDE 9 MG/ML
1000 INJECTION, SOLUTION INTRAVENOUS ONCE
Status: COMPLETED | OUTPATIENT
Start: 2021-10-08 | End: 2021-10-08

## 2021-10-08 RX ADMIN — ONDANSETRON HYDROCHLORIDE 4 MG: 2 SOLUTION INTRAMUSCULAR; INTRAVENOUS at 20:09

## 2021-10-08 RX ADMIN — SODIUM CHLORIDE 1000 ML: 9 INJECTION, SOLUTION INTRAVENOUS at 20:20

## 2021-10-08 RX ADMIN — FAMOTIDINE 20 MG: 10 INJECTION INTRAVENOUS at 20:10

## 2021-10-08 RX ADMIN — MORPHINE SULFATE 4 MG: 4 INJECTION INTRAVENOUS at 20:21

## 2021-10-08 ASSESSMENT — LIFESTYLE VARIABLES
DO YOU DRINK ALCOHOL: YES
EVER FELT BAD OR GUILTY ABOUT YOUR DRINKING: YES
TOTAL SCORE: 4
AVERAGE NUMBER OF DAYS PER WEEK YOU HAVE A DRINK CONTAINING ALCOHOL: 7
EVER HAD A DRINK FIRST THING IN THE MORNING TO STEADY YOUR NERVES TO GET RID OF A HANGOVER: YES
HAVE YOU EVER FELT YOU SHOULD CUT DOWN ON YOUR DRINKING: YES
CONSUMPTION TOTAL: POSITIVE
HOW MANY TIMES IN THE PAST YEAR HAVE YOU HAD 5 OR MORE DRINKS IN A DAY: 0
ON A TYPICAL DAY WHEN YOU DRINK ALCOHOL HOW MANY DRINKS DO YOU HAVE: 3
HAVE PEOPLE ANNOYED YOU BY CRITICIZING YOUR DRINKING: YES

## 2021-10-08 ASSESSMENT — FIBROSIS 4 INDEX: FIB4 SCORE: 2.05

## 2021-10-09 VITALS
HEART RATE: 72 BPM | RESPIRATION RATE: 18 BRPM | OXYGEN SATURATION: 94 % | DIASTOLIC BLOOD PRESSURE: 68 MMHG | WEIGHT: 136.91 LBS | BODY MASS INDEX: 22 KG/M2 | SYSTOLIC BLOOD PRESSURE: 138 MMHG | HEIGHT: 66 IN | TEMPERATURE: 98.4 F

## 2021-10-09 RX ORDER — FAMOTIDINE 20 MG/1
20 TABLET, FILM COATED ORAL 2 TIMES DAILY
Qty: 60 TABLET | Refills: 0 | Status: SHIPPED | OUTPATIENT
Start: 2021-10-09 | End: 2021-10-19

## 2021-10-09 RX ORDER — OXYCODONE HYDROCHLORIDE 5 MG/1
5 TABLET ORAL EVERY 4 HOURS PRN
Qty: 18 TABLET | Refills: 0 | Status: SHIPPED | OUTPATIENT
Start: 2021-10-09 | End: 2021-10-13

## 2021-10-09 RX ORDER — ONDANSETRON 4 MG/1
4 TABLET, ORALLY DISINTEGRATING ORAL EVERY 6 HOURS PRN
Qty: 10 TABLET | Refills: 0 | Status: SHIPPED | OUTPATIENT
Start: 2021-10-09 | End: 2022-07-10

## 2021-10-09 NOTE — ED PROVIDER NOTES
"ED Provider Note    ED Provider Note    Scribed for Keaton Chang MD by Keaton Chang M.D.. 10/8/2021, 7:54 PM.    Primary care provider: Jarrell Yates M.D.  Means of arrival: Private  History obtained from: Patient  History limited by: None    CHIEF COMPLAINT  Chief Complaint   Patient presents with   • RUQ Pain     Pt has been seen frequently for c/o same, states recently had liver biopsy and was told follow up appointment \"has been bumped up\"   • N/V       HPI  Anu Manuel is a 66 y.o. female who presents to the Emergency Department for evaluation of epigastric abdominal discomfort.  Patient relates she has had this on and off for quite some time.  Pain is localized epigastrium involving also right upper quadrant with radiation to the mid back, sharp in character, on and off but currently severe.  She took Norco with no improvement.  [facility], Dr. Zavala.  Patient has history of diabetes and is already status post appendectomy.  She relates recent biopsy of the liver, she was told today that she will be having an appointment to discuss the results on Monday.  Also notes darkening urine which she is not had before, no obvious bright red blood in the urine.  No fever, no particular ill contacts.  Patient notes persistent vomiting and difficulty tolerating oral intake and given her pain and no improvement with her home medication she came to be assessed.    REVIEW OF SYSTEMS  Pertinent positives include epigastric pain, nausea, vomiting, history of liver disease status post recent biopsy. Pertinent negatives include no fever, no acute trauma, no hematemesis.  All other systems reviewed and negative.    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    SURGICAL HISTORY   has a past surgical history that includes hysterectomy, vaginal (2006); thyroid lobectomy (1973); lumpectomy (1976, 2005); cervical disk and fusion anterior (03/12/08); tonsillectomy (1963); cervical fusion posterior (1/16/2009); hardware removal neuro (1/16/2009); neck exploration (1/16/2009); abdominal hysterectomy total; lumpectomy; lumbar laminectomy diskectomy (Right, 5/10/2016); shoulder decompression arthroscopic (6/17/08); clavicle distal excision (6/17/08); shoulder arthroscopy w/ rotator cuff repair (10/9/08); njx aa&/strd tfrml epi lumbar/sacral 1 level (Right, 8/31/2016); spinal cord stimulator (N/A, 10/26/2018); thoracic laminectomy (N/A, 10/26/2018); appendectomy (2004); implant neurostim/ (N/A, 12/16/2019); irrigation & debridement neuro (1/19/2020); and cath placement (1/25/2020).    SOCIAL HISTORY  Social History     Tobacco Use   • Smoking status: Current Every Day Smoker     Packs/day: 0.50     Years: 30.00     Pack years: 15.00     Types: Cigarettes   • Smokeless tobacco: Never Used   Vaping Use   • Vaping Use: Never used   Substance Use Topics   • Alcohol use: Yes   • Drug use: Yes     Comment: Marijuana      Social History     Substance and Sexual Activity   Drug Use Yes    Comment: Marijuana       FAMILY HISTORY  Family History   Problem Relation Age of Onset   • Hypertension Mother    • Cancer Father        CURRENT MEDICATIONS  Home Medications    **Home medications have not yet been reviewed for this encounter**         ALLERGIES  Allergies   Allergen Reactions   • Tape      Blisters, paper tape is ok  "      PHYSICAL EXAM  VITAL SIGNS: /68   Pulse 72   Temp 36.9 °C (98.4 °F) (Temporal)   Resp 18   Ht 1.676 m (5' 6\")   Wt 62.1 kg (136 lb 14.5 oz)   LMP 04/29/2005 (LMP Unknown)   SpO2 94%   BMI 22.10 kg/m²     General: Alert, mild acute distress.  Appears uncomfortable  Skin: Warm, dry, jaundiced, otherwise normal for ethnicity  Head: Normocephalic, atraumatic  Neck: Trachea midline, no tenderness  Eye: PERRL, normal conjunctiva, sclera are icteric  ENMT: Oral mucosa pink and dry, no pharyngeal erythema or exudate  Cardiovascular: Regular rate and rhythm, No murmur, Normal peripheral perfusion  Respiratory: Lungs CTA, respirations are non-labored, breath sounds are equal  Gastrointestinal: Soft, tender to epigastrium in the right upper quadrant, bowel sounds are mildly hypoactive.  No guarding, no rebound, no rigidity.  Musculoskeletal: No swelling, no deformity  Neurological: Alert and oriented to person, place, time, and situation  Lymphatics: No lymphadenopathy  Psychiatric: Cooperative, appropriate mood & affect      DIAGNOSTIC STUDIES/PROCEDURES    LABS  Results for orders placed or performed during the hospital encounter of 10/08/21   CBC WITH DIFFERENTIAL   Result Value Ref Range    WBC 9.6 4.8 - 10.8 K/uL    RBC 2.67 (L) 4.20 - 5.40 M/uL    Hemoglobin 8.7 (L) 12.0 - 16.0 g/dL    Hematocrit 27.1 (L) 37.0 - 47.0 %    .5 (H) 81.4 - 97.8 fL    MCH 32.6 27.0 - 33.0 pg    MCHC 32.1 (L) 33.6 - 35.0 g/dL    RDW 63.0 (H) 35.9 - 50.0 fL    Platelet Count 471 (H) 164 - 446 K/uL    MPV 11.0 9.0 - 12.9 fL    Neutrophils-Polys 75.00 (H) 44.00 - 72.00 %    Lymphocytes 10.30 (L) 22.00 - 41.00 %    Monocytes 11.80 0.00 - 13.40 %    Eosinophils 1.60 0.00 - 6.90 %    Basophils 0.70 0.00 - 1.80 %    Immature Granulocytes 0.60 0.00 - 0.90 %    Nucleated RBC 0.00 /100 WBC    Neutrophils (Absolute) 7.21 (H) 2.00 - 7.15 K/uL    Lymphs (Absolute) 0.99 (L) 1.00 - 4.80 K/uL    Monos (Absolute) 1.13 (H) 0.00 - 0.85 " K/uL    Eos (Absolute) 0.15 0.00 - 0.51 K/uL    Baso (Absolute) 0.07 0.00 - 0.12 K/uL    Immature Granulocytes (abs) 0.06 0.00 - 0.11 K/uL    NRBC (Absolute) 0.00 K/uL   COMP METABOLIC PANEL   Result Value Ref Range    Sodium 131 (L) 135 - 145 mmol/L    Potassium 3.3 (L) 3.6 - 5.5 mmol/L    Chloride 97 96 - 112 mmol/L    Co2 25 20 - 33 mmol/L    Anion Gap 9.0 7.0 - 16.0    Glucose 225 (H) 65 - 99 mg/dL    Bun 15 8 - 22 mg/dL    Creatinine 0.75 0.50 - 1.40 mg/dL    Calcium 8.5 8.4 - 10.2 mg/dL    AST(SGOT) 156 (H) 12 - 45 U/L    ALT(SGPT) 121 (H) 2 - 50 U/L    Alkaline Phosphatase 2171 (H) 30 - 99 U/L    Total Bilirubin 8.7 (H) 0.1 - 1.5 mg/dL    Albumin 2.5 (L) 3.2 - 4.9 g/dL    Total Protein 6.1 6.0 - 8.2 g/dL    Globulin 3.6 (H) 1.9 - 3.5 g/dL    A-G Ratio 0.7 g/dL   LIPASE   Result Value Ref Range    Lipase 13 7 - 58 U/L   URINALYSIS (UA)    Specimen: Urine   Result Value Ref Range    Color Brown (A)     Character Hazy (A)     Specific Gravity >=1.030 <1.035    Ph see comment 5.0 - 8.0    Micro Urine Req Microscopic    APTT   Result Value Ref Range    APTT 27.5 24.7 - 36.0 sec   PROTHROMBIN TIME (INR)   Result Value Ref Range    PT 12.6 12.0 - 14.6 sec    INR 1.02 0.87 - 1.13   LACTIC ACID   Result Value Ref Range    Lactic Acid 0.9 0.5 - 2.0 mmol/L   ESTIMATED GFR   Result Value Ref Range    GFR If African American >60 >60 mL/min/1.73 m 2    GFR If Non African American >60 >60 mL/min/1.73 m 2   URINE MICROSCOPIC (W/UA)   Result Value Ref Range    WBC 0-2 /hpf    RBC 2-5 (A) /hpf    Bacteria Few (A) None /hpf    Epithelial Cells Few Few /hpf    Mucous Threads Few /hpf    Ca Oxalate Crystal Moderate /hpf     All labs reviewed by me, labs approximating previous established baseline.        COURSE & MEDICAL DECISION MAKING  Pertinent Labs & Imaging studies reviewed. (See chart for details).  Reviewed unremarkable CT of the abdomen pelvis from the fourth of this month demonstrating cholelithiasis but otherwise  "nonacute.    7:54 PM - Patient seen and examined at bedside. Patient will be treated with morphine, Zofran, famotidine, 1 L of crystalloid. Ordered metabolic work-up to evaluate her symptoms. The differential diagnoses include but are not limited to: Gastritis, peptic ulcer disease, pancreatitis, hepatitis    2310: Patient reassessed, feeling better. I have updated her with her lab results. Bilirubin is significantly high as her alkaline phosphatase. Thankfully is essentially unchanged from previous. Awaiting urinalysis at this time.    Patient Vitals for the past 24 hrs:   BP Temp Temp src Pulse Resp SpO2 Height Weight   10/09/21 0031 138/68 36.9 °C (98.4 °F) Temporal 72 18 94 % -- --   10/09/21 0001 143/72 -- -- 65 -- 92 % -- --   10/08/21 2301 140/70 -- -- 65 -- 92 % -- --   10/08/21 2201 138/67 -- -- 66 12 91 % -- --   10/08/21 2101 146/73 -- -- 68 13 93 % -- --   10/08/21 2001 149/79 -- -- 68 (!) 21 97 % -- --   10/08/21 1928 (!) 162/100 36.7 °C (98 °F) Temporal 78 18 95 % -- --   10/08/21 1927 -- -- -- -- -- -- 1.676 m (5' 6\") 62.1 kg (136 lb 14.5 oz)     HYDRATION: Based on the patient's presentation of Acute Vomiting and Dehydration the patient was given IV fluids. IV Hydration was used because oral hydration was not as rapid as required. Upon recheck following hydration, the patient was Doing better, heart improving, currently 72.    Decision Making:  This is a 66 y.o. year old female who presents with acute upper abdominal pain with associated nausea and vomiting.  History of hepatic impairment for which she [facility].  Her alkaline phosphatase is impressively elevated as is her bilirubin.  Thankfully she is not coagulopathic otherwise her labs are similar to previous baselines.  Patient has had a recent liver biopsy and will be following up for the results on Monday.  I reviewed unremarkable CT imaging as well from earlier in the month, given no leukocytosis, no fever, " unremarkable abdominal exam labs otherwise baseline no indication for repeat CT imaging.  She is feeling better and able to tolerate p.o. after IV medications given here in the ED.  Patient is amenable to outpatient follow-up with her established gastroenterologist, no indication for inpatient management at this time.    I reviewed prescription monitoring program for patient's narcotic use before prescribing a scheduled drug.The patient will not drink alcohol nor drive with prescribed medications      In prescribing controlled substances to this patient, I certify that I have obtained and reviewed the medical history this patient I have also made a good sindhu effort to obtain applicable records from other providers who have treated the patient and records did not demonstrate any increased risk of substance abuse that would prevent me from prescribing controlled substances.     I have conducted a physical exam and documented it. I have reviewed Ms. Manuel’s prescription history as maintained by the [place] Prescription Monitoring Program.     I have assessed the patient’s risk for abuse, dependency, and addiction using the validated Opioid Risk Tool available at https://www.mdcalc.com/wjejqj-toiu-vyrj-ort-narcotic-abuse.     Given the above, I believe the benefits of controlled substance therapy outweigh the risks. The reasons for prescribing controlled substances include in my professional opinion, controlled substances are a reasonable choice for this patient. Accordingly, I have discussed the risk and benefits, treatment plan, and alternative therapies with the patient. The patient has been consented for the medication and understands the risks.      The patient will return for new or worsening symptoms and is stable at the time of discharge.    Patient has had high blood pressure while in the emergency department, felt likely secondary to medical condition. Counseled patient to monitor blood pressure at home and  follow up with primary care physician.    DISPOSITION:  Patient will be discharged home in stable condition.    FOLLOW UP:  Jarrell Yates M.D.  [address]  [address]  [facility] [address]  [phone]          Hayden Zavala M.D.  [address] [id number] [phone]    Schedule an appointment as soon as possible for a visit         OUTPATIENT MEDICATIONS:  Discharge Medication List as of 10/9/2021 12:32 AM      START taking these medications    Details   ondansetron (ZOFRAN ODT) 4 MG TABLET DISPERSIBLE Take 1 Tablet by mouth every 6 hours as needed for Nausea., Disp-10 Tablet, R-0, Normal      famotidine (PEPCID) 20 MG Tab Take 1 Tablet by mouth 2 times a day., Disp-60 Tablet, R-0, Normal               FINAL IMPRESSION  1. Abdominal pain, acute, epigastric    2. Nausea and vomiting, intractability of vomiting not specified, unspecified vomiting type    3. Dehydration    4. Jaundice    5. Elevated alkaline phosphatase level          Keaton MCKEON M.D. (Susan), am scribing for, and in the presence of, Keaton Chang MD.    Electronically signed by: Keaton Chang M.D. (Scribe), 10/8/2021    IKeaton MD personally performed the services described in this documentation, as scribed by Keaton Chang M.D. in my presence, and it is both accurate and complete    The note accurately reflects work and decisions made by me.  Keaton Chang M.D.  10/9/2021  1:04 AM

## 2021-10-09 NOTE — ED PROVIDER NOTES
"ED Provider Note    ED Provider Note    Scribed for Keaton Chang MD by Keaton Chang M.D.. 10/8/2021, 7:54 PM.    Primary care provider: Jarrell Yates M.D.  Means of arrival: Private  History obtained from: Patient  History limited by: None    CHIEF COMPLAINT  Chief Complaint   Patient presents with   • RUQ Pain     Pt has been seen frequently for c/o same, states recently had liver biopsy and was told follow up appointment \"has been bumped up\"   • N/V       HPI  Anu Manuel is a 66 y.o. female who presents to the Emergency Department for evaluation of epigastric abdominal discomfort.  Patient relates she has had this on and off for quite some time.  Pain is localized epigastrium involving also right upper quadrant with radiation to the mid back, sharp in character, on and off but currently severe.  She took Norco with no improvement.  [facility], Dr. Zavala.  Patient has history of diabetes and is already status post appendectomy.  She relates recent biopsy of the liver, she was told today that she will be having an appointment to discuss the results on Monday.  Also notes darkening urine which she is not had before, no obvious bright red blood in the urine.  No fever, no particular ill contacts.  Patient notes persistent vomiting and difficulty tolerating oral intake and given her pain and no improvement with her home medication she came to be assessed.    REVIEW OF SYSTEMS  Pertinent positives include epigastric pain, nausea, vomiting, history of liver disease status post recent biopsy. Pertinent negatives include no fever, no acute trauma, no hematemesis.  All other systems reviewed and negative.    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    SURGICAL HISTORY   has a past surgical history that includes hysterectomy, vaginal (2006); thyroid lobectomy (1973); lumpectomy (1976, 2005); cervical disk and fusion anterior (03/12/08); tonsillectomy (1963); cervical fusion posterior (1/16/2009); hardware removal neuro (1/16/2009); neck exploration (1/16/2009); abdominal hysterectomy total; lumpectomy; lumbar laminectomy diskectomy (Right, 5/10/2016); shoulder decompression arthroscopic (6/17/08); clavicle distal excision (6/17/08); shoulder arthroscopy w/ rotator cuff repair (10/9/08); njx aa&/strd tfrml epi lumbar/sacral 1 level (Right, 8/31/2016); spinal cord stimulator (N/A, 10/26/2018); thoracic laminectomy (N/A, 10/26/2018); appendectomy (2004); implant neurostim/ (N/A, 12/16/2019); irrigation & debridement neuro (1/19/2020); and cath placement (1/25/2020).    SOCIAL HISTORY  Social History     Tobacco Use   • Smoking status: Current Every Day Smoker     Packs/day: 0.50     Years: 30.00     Pack years: 15.00     Types: Cigarettes   • Smokeless tobacco: Never Used   Vaping Use   • Vaping Use: Never used   Substance Use Topics   • Alcohol use: Yes   • Drug use: Yes     Comment: Marijuana      Social History     Substance and Sexual Activity   Drug Use Yes    Comment: Marijuana       FAMILY HISTORY  Family History   Problem Relation Age of Onset   • Hypertension Mother    • Cancer Father        CURRENT MEDICATIONS  Home Medications    **Home medications have not yet been reviewed for this encounter**         ALLERGIES  Allergies   Allergen Reactions   • Tape      Blisters, paper tape is ok  "      PHYSICAL EXAM  VITAL SIGNS: /68   Pulse 72   Temp 36.9 °C (98.4 °F) (Temporal)   Resp 18   Ht 1.676 m (5' 6\")   Wt 62.1 kg (136 lb 14.5 oz)   LMP 04/29/2005 (LMP Unknown)   SpO2 94%   BMI 22.10 kg/m²     General: Alert, mild acute distress.  Appears uncomfortable  Skin: Warm, dry, jaundiced, otherwise normal for ethnicity  Head: Normocephalic, atraumatic  Neck: Trachea midline, no tenderness  Eye: PERRL, normal conjunctiva, sclera are icteric  ENMT: Oral mucosa pink and dry, no pharyngeal erythema or exudate  Cardiovascular: Regular rate and rhythm, No murmur, Normal peripheral perfusion  Respiratory: Lungs CTA, respirations are non-labored, breath sounds are equal  Gastrointestinal: Soft, tender to epigastrium in the right upper quadrant, bowel sounds are mildly hypoactive.  No guarding, no rebound, no rigidity.  Musculoskeletal: No swelling, no deformity  Neurological: Alert and oriented to person, place, time, and situation  Lymphatics: No lymphadenopathy  Psychiatric: Cooperative, appropriate mood & affect      DIAGNOSTIC STUDIES/PROCEDURES    LABS  Results for orders placed or performed during the hospital encounter of 10/08/21   CBC WITH DIFFERENTIAL   Result Value Ref Range    WBC 9.6 4.8 - 10.8 K/uL    RBC 2.67 (L) 4.20 - 5.40 M/uL    Hemoglobin 8.7 (L) 12.0 - 16.0 g/dL    Hematocrit 27.1 (L) 37.0 - 47.0 %    .5 (H) 81.4 - 97.8 fL    MCH 32.6 27.0 - 33.0 pg    MCHC 32.1 (L) 33.6 - 35.0 g/dL    RDW 63.0 (H) 35.9 - 50.0 fL    Platelet Count 471 (H) 164 - 446 K/uL    MPV 11.0 9.0 - 12.9 fL    Neutrophils-Polys 75.00 (H) 44.00 - 72.00 %    Lymphocytes 10.30 (L) 22.00 - 41.00 %    Monocytes 11.80 0.00 - 13.40 %    Eosinophils 1.60 0.00 - 6.90 %    Basophils 0.70 0.00 - 1.80 %    Immature Granulocytes 0.60 0.00 - 0.90 %    Nucleated RBC 0.00 /100 WBC    Neutrophils (Absolute) 7.21 (H) 2.00 - 7.15 K/uL    Lymphs (Absolute) 0.99 (L) 1.00 - 4.80 K/uL    Monos (Absolute) 1.13 (H) 0.00 - 0.85 " K/uL    Eos (Absolute) 0.15 0.00 - 0.51 K/uL    Baso (Absolute) 0.07 0.00 - 0.12 K/uL    Immature Granulocytes (abs) 0.06 0.00 - 0.11 K/uL    NRBC (Absolute) 0.00 K/uL   COMP METABOLIC PANEL   Result Value Ref Range    Sodium 131 (L) 135 - 145 mmol/L    Potassium 3.3 (L) 3.6 - 5.5 mmol/L    Chloride 97 96 - 112 mmol/L    Co2 25 20 - 33 mmol/L    Anion Gap 9.0 7.0 - 16.0    Glucose 225 (H) 65 - 99 mg/dL    Bun 15 8 - 22 mg/dL    Creatinine 0.75 0.50 - 1.40 mg/dL    Calcium 8.5 8.4 - 10.2 mg/dL    AST(SGOT) 156 (H) 12 - 45 U/L    ALT(SGPT) 121 (H) 2 - 50 U/L    Alkaline Phosphatase 2171 (H) 30 - 99 U/L    Total Bilirubin 8.7 (H) 0.1 - 1.5 mg/dL    Albumin 2.5 (L) 3.2 - 4.9 g/dL    Total Protein 6.1 6.0 - 8.2 g/dL    Globulin 3.6 (H) 1.9 - 3.5 g/dL    A-G Ratio 0.7 g/dL   LIPASE   Result Value Ref Range    Lipase 13 7 - 58 U/L   URINALYSIS (UA)    Specimen: Urine   Result Value Ref Range    Color Brown (A)     Character Hazy (A)     Specific Gravity >=1.030 <1.035    Ph see comment 5.0 - 8.0    Micro Urine Req Microscopic    APTT   Result Value Ref Range    APTT 27.5 24.7 - 36.0 sec   PROTHROMBIN TIME (INR)   Result Value Ref Range    PT 12.6 12.0 - 14.6 sec    INR 1.02 0.87 - 1.13   LACTIC ACID   Result Value Ref Range    Lactic Acid 0.9 0.5 - 2.0 mmol/L   ESTIMATED GFR   Result Value Ref Range    GFR If African American >60 >60 mL/min/1.73 m 2    GFR If Non African American >60 >60 mL/min/1.73 m 2   URINE MICROSCOPIC (W/UA)   Result Value Ref Range    WBC 0-2 /hpf    RBC 2-5 (A) /hpf    Bacteria Few (A) None /hpf    Epithelial Cells Few Few /hpf    Mucous Threads Few /hpf    Ca Oxalate Crystal Moderate /hpf     All labs reviewed by me, concerning findings with regard to elevated bilirubin and sharply elevated alkaline phosphatase, thankfully not significantly changed from previous.      COURSE & MEDICAL DECISION MAKING  Pertinent Labs & Imaging studies reviewed. (See chart for details).  Reviewed unremarkable CT of  "the abdomen pelvis from the fourth of this month demonstrating cholelithiasis but otherwise nonacute.    7:54 PM - Patient seen and examined at bedside. Patient will be treated with morphine, Zofran, famotidine, 1 L of crystalloid. Ordered metabolic work-up to evaluate her symptoms. The differential diagnoses include but are not limited to: Gastritis, gastroenteritis, pancreatitis, hepatitis    2310: Patient reassessed, feeling better. I have updated her with her lab results. Bilirubin is significantly high as her alkaline phosphatase. Thankfully is essentially unchanged from previous. Awaiting urinalysis at this time.    Patient Vitals for the past 24 hrs:   BP Temp Temp src Pulse Resp SpO2 Height Weight   10/09/21 0031 138/68 36.9 °C (98.4 °F) Temporal 72 18 94 % -- --   10/09/21 0001 143/72 -- -- 65 -- 92 % -- --   10/08/21 2301 140/70 -- -- 65 -- 92 % -- --   10/08/21 2201 138/67 -- -- 66 12 91 % -- --   10/08/21 2101 146/73 -- -- 68 13 93 % -- --   10/08/21 2001 149/79 -- -- 68 (!) 21 97 % -- --   10/08/21 1928 (!) 162/100 36.7 °C (98 °F) Temporal 78 18 95 % -- --   10/08/21 1927 -- -- -- -- -- -- 1.676 m (5' 6\") 62.1 kg (136 lb 14.5 oz)     HYDRATION: Based on the patient's presentation of Acute Vomiting and Dehydration the patient was given IV fluids. IV Hydration was used because oral hydration was not as rapid as required. Upon recheck following hydration, the patient was Doing better, heart rate improving, currently 72.    Decision Making:  This is a 66 y.o. year old female who presents with acute epigastric abdominal pain with associated nausea and vomiting.  Symptoms recurred essentially over the last 1 to 2 days.  She does appear to be fine depleted and is mildly pale, clear indication for IV fluids given the active vomiting.  She has a complicated medical history including significant pack impairment.  Bilirubin is sharply elevated, 8.7 today.  Thankfully this is actually similar to previous.  Patient " does have established gastroenterologist with whom she is following, she had a liver biopsy and will review the results on Monday.  I reviewed recent unremarkable CT imaging, her labs are approximating her similar to previous baselines.  No indication for inpatient management, no evidence of surgical abdomen.    {ADMDISCHARGENOTE or emssadmitnote}    FINAL IMPRESSION  1. Abdominal pain, acute, epigastric    2. Nausea and vomiting, intractability of vomiting not specified, unspecified vomiting type    3. Dehydration          Keaton MCKEON M.D. (Scribe), am scribing for, and in the presence of, Keaton Chang MD.    Electronically signed by: Keaton Chang M.D. (Scribe), 10/8/2021    Keaton MCKEON MD personally performed the services described in this documentation, as scribed by Keaton Chang M.D. in my presence, and it is both accurate and complete    {ERP Attestation (ERP ONLY):[id number]}

## 2021-10-09 NOTE — ED NOTES
Pt to be discahrged home, ambulatory, A&Ox4 with family. Pt given discharge, follw up and prescription instructions. Verbalizes understanding

## 2021-10-09 NOTE — ED TRIAGE NOTES
"Chief Complaint   Patient presents with   • RUQ Pain     Pt has been seen frequently for c/o same, states recently had liver biopsy and was told follow up appointment \"has been bumped up\"   • N/V     BP (!) 162/100   Pulse 78   Temp 36.7 °C (98 °F) (Temporal)   Resp 18   Ht 1.676 m (5' 6\")   Wt 62.1 kg (136 lb 14.5 oz)   LMP 04/29/2005 (LMP Unknown)   SpO2 95%   BMI 22.10 kg/m²     Has this patient been vaccinated for COVID NO  If not, would they like to be vaccinated while in the ER if eligible?  NO  Would the patient like to speak with the ERP about the possibility of receiving the COVID vaccine today before making a decision? NO    Pt arrived via [facility] from home for c/o ongoing RUQ pain s/p liver biopsy.  Pt was given 2mg Morphine and 4mg Zofran via [facility] PTA.        "

## 2021-10-20 PROBLEM — R17 JAUNDICE: Status: ACTIVE | Noted: 2021-10-20

## 2021-10-21 ENCOUNTER — APPOINTMENT (OUTPATIENT)
Dept: RADIOLOGY | Facility: MEDICAL CENTER | Age: 66
DRG: 442 | End: 2021-10-21
Attending: EMERGENCY MEDICINE
Payer: MEDICARE

## 2021-10-21 ENCOUNTER — HOSPITAL ENCOUNTER (INPATIENT)
Facility: MEDICAL CENTER | Age: 66
LOS: 1 days | DRG: 442 | End: 2021-10-22
Attending: EMERGENCY MEDICINE | Admitting: STUDENT IN AN ORGANIZED HEALTH CARE EDUCATION/TRAINING PROGRAM
Payer: MEDICARE

## 2021-10-21 DIAGNOSIS — K75.4 AUTOIMMUNE HEPATITIS (HCC): ICD-10-CM

## 2021-10-21 DIAGNOSIS — R17 JAUNDICE: ICD-10-CM

## 2021-10-21 DIAGNOSIS — E80.6 HYPERBILIRUBINEMIA: ICD-10-CM

## 2021-10-21 PROBLEM — E86.0 DEHYDRATION: Status: ACTIVE | Noted: 2021-10-21

## 2021-10-21 PROBLEM — R11.14 BILIOUS VOMITING WITH NAUSEA: Status: ACTIVE | Noted: 2021-10-21

## 2021-10-21 LAB
ALBUMIN SERPL BCP-MCNC: 2.2 G/DL (ref 3.2–4.9)
ALBUMIN/GLOB SERPL: 0.5 G/DL
ALP SERPL-CCNC: 2110 U/L (ref 30–99)
ALT SERPL-CCNC: 94 U/L (ref 2–50)
ANION GAP SERPL CALC-SCNC: 13 MMOL/L (ref 7–16)
APPEARANCE UR: ABNORMAL
AST SERPL-CCNC: 119 U/L (ref 12–45)
BACTERIA #/AREA URNS HPF: ABNORMAL /HPF
BASOPHILS # BLD AUTO: 1.2 % (ref 0–1.8)
BASOPHILS # BLD: 0.12 K/UL (ref 0–0.12)
BILIRUB SERPL-MCNC: 9.9 MG/DL (ref 0.1–1.5)
BILIRUB UR QL STRIP.AUTO: ABNORMAL
BUN SERPL-MCNC: 12 MG/DL (ref 8–22)
CALCIUM SERPL-MCNC: 8.4 MG/DL (ref 8.4–10.2)
CAOX CRY #/AREA URNS HPF: ABNORMAL /HPF
CHLORIDE SERPL-SCNC: 92 MMOL/L (ref 96–112)
CO2 SERPL-SCNC: 21 MMOL/L (ref 20–33)
COLOR UR: ABNORMAL
CREAT SERPL-MCNC: 0.73 MG/DL (ref 0.5–1.4)
EOSINOPHIL # BLD AUTO: 0.1 K/UL (ref 0–0.51)
EOSINOPHIL NFR BLD: 1 % (ref 0–6.9)
EPI CELLS #/AREA URNS HPF: ABNORMAL /HPF
ERYTHROCYTE [DISTWIDTH] IN BLOOD BY AUTOMATED COUNT: 62.6 FL (ref 35.9–50)
GLOBULIN SER CALC-MCNC: 4.1 G/DL (ref 1.9–3.5)
GLUCOSE SERPL-MCNC: 255 MG/DL (ref 65–99)
GLUCOSE UR STRIP.AUTO-MCNC: 100 MG/DL
HCT VFR BLD AUTO: 28.5 % (ref 37–47)
HGB BLD-MCNC: 8.9 G/DL (ref 12–16)
HYALINE CASTS #/AREA URNS LPF: ABNORMAL /LPF
IMM GRANULOCYTES # BLD AUTO: 0.05 K/UL (ref 0–0.11)
IMM GRANULOCYTES NFR BLD AUTO: 0.5 % (ref 0–0.9)
INR PPP: 1 (ref 0.87–1.13)
KETONES UR STRIP.AUTO-MCNC: 15 MG/DL
LEUKOCYTE ESTERASE UR QL STRIP.AUTO: ABNORMAL
LIPASE SERPL-CCNC: 8 U/L (ref 7–58)
LYMPHOCYTES # BLD AUTO: 0.97 K/UL (ref 1–4.8)
LYMPHOCYTES NFR BLD: 9.4 % (ref 22–41)
MCH RBC QN AUTO: 32.8 PG (ref 27–33)
MCHC RBC AUTO-ENTMCNC: 31.2 G/DL (ref 33.6–35)
MCV RBC AUTO: 105.2 FL (ref 81.4–97.8)
MICRO URNS: ABNORMAL
MONOCYTES # BLD AUTO: 0.98 K/UL (ref 0–0.85)
MONOCYTES NFR BLD AUTO: 9.5 % (ref 0–13.4)
NEUTROPHILS # BLD AUTO: 8.12 K/UL (ref 2–7.15)
NEUTROPHILS NFR BLD: 78.4 % (ref 44–72)
NITRITE UR QL STRIP.AUTO: NEGATIVE
NRBC # BLD AUTO: 0 K/UL
NRBC BLD-RTO: 0 /100 WBC
PH UR STRIP.AUTO: 6.5 [PH] (ref 5–8)
PLATELET # BLD AUTO: 429 K/UL (ref 164–446)
PMV BLD AUTO: 11.1 FL (ref 9–12.9)
POTASSIUM SERPL-SCNC: 3.7 MMOL/L (ref 3.6–5.5)
PROT SERPL-MCNC: 6.3 G/DL (ref 6–8.2)
PROT UR QL STRIP: >=300 MG/DL
PROTHROMBIN TIME: 12.4 SEC (ref 12–14.6)
RBC # BLD AUTO: 2.71 M/UL (ref 4.2–5.4)
RBC # URNS HPF: ABNORMAL /HPF
RBC UR QL AUTO: ABNORMAL
SODIUM SERPL-SCNC: 126 MMOL/L (ref 135–145)
SP GR UR STRIP.AUTO: 1.02
WBC # BLD AUTO: 10.3 K/UL (ref 4.8–10.8)
WBC #/AREA URNS HPF: ABNORMAL /HPF

## 2021-10-21 PROCEDURE — 82962 GLUCOSE BLOOD TEST: CPT

## 2021-10-21 PROCEDURE — 99223 1ST HOSP IP/OBS HIGH 75: CPT | Mod: AI | Performed by: STUDENT IN AN ORGANIZED HEALTH CARE EDUCATION/TRAINING PROGRAM

## 2021-10-21 PROCEDURE — 85610 PROTHROMBIN TIME: CPT

## 2021-10-21 PROCEDURE — 36415 COLL VENOUS BLD VENIPUNCTURE: CPT

## 2021-10-21 PROCEDURE — 96374 THER/PROPH/DIAG INJ IV PUSH: CPT

## 2021-10-21 PROCEDURE — 700111 HCHG RX REV CODE 636 W/ 250 OVERRIDE (IP): Performed by: EMERGENCY MEDICINE

## 2021-10-21 PROCEDURE — 99285 EMERGENCY DEPT VISIT HI MDM: CPT

## 2021-10-21 PROCEDURE — 700105 HCHG RX REV CODE 258: Performed by: EMERGENCY MEDICINE

## 2021-10-21 PROCEDURE — 80053 COMPREHEN METABOLIC PANEL: CPT

## 2021-10-21 PROCEDURE — A9270 NON-COVERED ITEM OR SERVICE: HCPCS | Performed by: STUDENT IN AN ORGANIZED HEALTH CARE EDUCATION/TRAINING PROGRAM

## 2021-10-21 PROCEDURE — 700102 HCHG RX REV CODE 250 W/ 637 OVERRIDE(OP): Performed by: STUDENT IN AN ORGANIZED HEALTH CARE EDUCATION/TRAINING PROGRAM

## 2021-10-21 PROCEDURE — 96376 TX/PRO/DX INJ SAME DRUG ADON: CPT

## 2021-10-21 PROCEDURE — 770006 HCHG ROOM/CARE - MED/SURG/GYN SEMI*

## 2021-10-21 PROCEDURE — 76705 ECHO EXAM OF ABDOMEN: CPT

## 2021-10-21 PROCEDURE — 81001 URINALYSIS AUTO W/SCOPE: CPT

## 2021-10-21 PROCEDURE — 83690 ASSAY OF LIPASE: CPT

## 2021-10-21 PROCEDURE — 85025 COMPLETE CBC W/AUTO DIFF WBC: CPT

## 2021-10-21 RX ORDER — MORPHINE SULFATE 4 MG/ML
4 INJECTION, SOLUTION INTRAMUSCULAR; INTRAVENOUS ONCE
Status: COMPLETED | OUTPATIENT
Start: 2021-10-21 | End: 2021-10-21

## 2021-10-21 RX ORDER — INSULIN LISPRO 100 [IU]/ML
0.2 INJECTION, SOLUTION INTRAVENOUS; SUBCUTANEOUS
Status: DISCONTINUED | OUTPATIENT
Start: 2021-10-22 | End: 2021-10-22 | Stop reason: HOSPADM

## 2021-10-21 RX ORDER — URSODIOL 300 MG/1
300 CAPSULE ORAL 3 TIMES DAILY
Status: ON HOLD | COMMUNITY
End: 2021-10-22

## 2021-10-21 RX ORDER — TRAZODONE HYDROCHLORIDE 150 MG/1
150 TABLET ORAL
Status: ON HOLD | COMMUNITY

## 2021-10-21 RX ORDER — SODIUM CHLORIDE 9 MG/ML
1000 INJECTION, SOLUTION INTRAVENOUS ONCE
Status: COMPLETED | OUTPATIENT
Start: 2021-10-21 | End: 2021-10-21

## 2021-10-21 RX ORDER — METOPROLOL SUCCINATE 100 MG/1
200 TABLET, EXTENDED RELEASE ORAL DAILY
Refills: 3 | Status: DISCONTINUED | OUTPATIENT
Start: 2021-10-22 | End: 2021-10-22 | Stop reason: HOSPADM

## 2021-10-21 RX ORDER — AMLODIPINE BESYLATE 5 MG/1
5 TABLET ORAL EVERY EVENING
Status: DISCONTINUED | OUTPATIENT
Start: 2021-10-21 | End: 2021-10-22 | Stop reason: HOSPADM

## 2021-10-21 RX ORDER — ONDANSETRON 4 MG/1
4 TABLET, ORALLY DISINTEGRATING ORAL EVERY 6 HOURS PRN
Status: DISCONTINUED | OUTPATIENT
Start: 2021-10-21 | End: 2021-10-22 | Stop reason: HOSPADM

## 2021-10-21 RX ORDER — INSULIN LISPRO 100 [IU]/ML
1-6 INJECTION, SOLUTION INTRAVENOUS; SUBCUTANEOUS
Status: DISCONTINUED | OUTPATIENT
Start: 2021-10-21 | End: 2021-10-22 | Stop reason: HOSPADM

## 2021-10-21 RX ORDER — URSODIOL 300 MG/1
300 CAPSULE ORAL 2 TIMES DAILY
Status: DISCONTINUED | OUTPATIENT
Start: 2021-10-22 | End: 2021-10-22

## 2021-10-21 RX ORDER — PYRIDOXINE HCL (VITAMIN B6) 50 MG
50 TABLET ORAL EVERY MORNING
COMMUNITY
End: 2022-01-03

## 2021-10-21 RX ORDER — BISACODYL 10 MG
10 SUPPOSITORY, RECTAL RECTAL
Status: DISCONTINUED | OUTPATIENT
Start: 2021-10-21 | End: 2021-10-22 | Stop reason: HOSPADM

## 2021-10-21 RX ORDER — LEVOTHYROXINE SODIUM 0.03 MG/1
25 TABLET ORAL
Status: DISCONTINUED | OUTPATIENT
Start: 2021-10-22 | End: 2021-10-22 | Stop reason: HOSPADM

## 2021-10-21 RX ORDER — ALPRAZOLAM 0.5 MG/1
0.5 TABLET ORAL
Status: DISCONTINUED | OUTPATIENT
Start: 2021-10-21 | End: 2021-10-22 | Stop reason: HOSPADM

## 2021-10-21 RX ORDER — ENALAPRILAT 1.25 MG/ML
1.25 INJECTION INTRAVENOUS EVERY 6 HOURS PRN
Status: DISCONTINUED | OUTPATIENT
Start: 2021-10-21 | End: 2021-10-22 | Stop reason: HOSPADM

## 2021-10-21 RX ORDER — LEVOTHYROXINE SODIUM 0.1 MG/1
200 TABLET ORAL
Status: DISCONTINUED | OUTPATIENT
Start: 2021-10-22 | End: 2021-10-22 | Stop reason: HOSPADM

## 2021-10-21 RX ORDER — AMOXICILLIN 250 MG
2 CAPSULE ORAL 2 TIMES DAILY
Status: DISCONTINUED | OUTPATIENT
Start: 2021-10-21 | End: 2021-10-22 | Stop reason: HOSPADM

## 2021-10-21 RX ORDER — FAMOTIDINE 20 MG/1
20 TABLET, FILM COATED ORAL DAILY
Status: DISCONTINUED | OUTPATIENT
Start: 2021-10-22 | End: 2021-10-21

## 2021-10-21 RX ORDER — DEXTROSE MONOHYDRATE 25 G/50ML
50 INJECTION, SOLUTION INTRAVENOUS
Status: DISCONTINUED | OUTPATIENT
Start: 2021-10-21 | End: 2021-10-22 | Stop reason: HOSPADM

## 2021-10-21 RX ORDER — LEVOTHYROXINE SODIUM 0.03 MG/1
25 TABLET ORAL
COMMUNITY
End: 2022-07-10

## 2021-10-21 RX ORDER — FAMOTIDINE 20 MG/1
20 TABLET, FILM COATED ORAL 2 TIMES DAILY
Status: ON HOLD | COMMUNITY
End: 2021-11-03

## 2021-10-21 RX ORDER — LEVOTHYROXINE SODIUM 0.05 MG/1
200 TABLET ORAL
Status: DISCONTINUED | OUTPATIENT
Start: 2021-10-21 | End: 2021-10-21

## 2021-10-21 RX ORDER — NICOTINE 21 MG/24HR
21 PATCH, TRANSDERMAL 24 HOURS TRANSDERMAL
Status: DISCONTINUED | OUTPATIENT
Start: 2021-10-22 | End: 2021-10-22 | Stop reason: HOSPADM

## 2021-10-21 RX ORDER — INSULIN ASPART 100 [IU]/ML
1-5 INJECTION, SOLUTION INTRAVENOUS; SUBCUTANEOUS 3 TIMES DAILY
COMMUNITY
End: 2023-04-04

## 2021-10-21 RX ORDER — LABETALOL HYDROCHLORIDE 5 MG/ML
10 INJECTION, SOLUTION INTRAVENOUS EVERY 4 HOURS PRN
Status: DISCONTINUED | OUTPATIENT
Start: 2021-10-21 | End: 2021-10-22 | Stop reason: HOSPADM

## 2021-10-21 RX ORDER — CLONIDINE HYDROCHLORIDE 0.1 MG/1
0.1 TABLET ORAL EVERY 6 HOURS PRN
Status: DISCONTINUED | OUTPATIENT
Start: 2021-10-21 | End: 2021-10-22 | Stop reason: HOSPADM

## 2021-10-21 RX ORDER — DIPHENHYDRAMINE HYDROCHLORIDE 50 MG/ML
25 INJECTION INTRAMUSCULAR; INTRAVENOUS EVERY 6 HOURS PRN
Status: DISCONTINUED | OUTPATIENT
Start: 2021-10-21 | End: 2021-10-22 | Stop reason: HOSPADM

## 2021-10-21 RX ORDER — POLYETHYLENE GLYCOL 3350 17 G/17G
1 POWDER, FOR SOLUTION ORAL
Status: DISCONTINUED | OUTPATIENT
Start: 2021-10-21 | End: 2021-10-22 | Stop reason: HOSPADM

## 2021-10-21 RX ORDER — OXYCODONE HYDROCHLORIDE 10 MG/1
10 TABLET ORAL
Status: DISCONTINUED | OUTPATIENT
Start: 2021-10-21 | End: 2021-10-22 | Stop reason: HOSPADM

## 2021-10-21 RX ORDER — SODIUM CHLORIDE 9 MG/ML
INJECTION, SOLUTION INTRAVENOUS CONTINUOUS
Status: DISCONTINUED | OUTPATIENT
Start: 2021-10-21 | End: 2021-10-22 | Stop reason: HOSPADM

## 2021-10-21 RX ADMIN — SODIUM CHLORIDE 1000 ML: 9 INJECTION, SOLUTION INTRAVENOUS at 15:40

## 2021-10-21 RX ADMIN — MORPHINE SULFATE 4 MG: 4 INJECTION INTRAVENOUS at 16:52

## 2021-10-21 RX ADMIN — URSODIOL 300 MG: 300 CAPSULE ORAL at 23:14

## 2021-10-21 RX ADMIN — MORPHINE SULFATE 4 MG: 4 INJECTION INTRAVENOUS at 14:57

## 2021-10-21 RX ADMIN — INSULIN LISPRO 2 UNITS: 100 INJECTION, SOLUTION INTRAVENOUS; SUBCUTANEOUS at 20:18

## 2021-10-21 RX ADMIN — AMLODIPINE BESYLATE 5 MG: 5 TABLET ORAL at 20:10

## 2021-10-21 RX ADMIN — OXYCODONE HYDROCHLORIDE 10 MG: 10 TABLET ORAL at 20:10

## 2021-10-21 RX ADMIN — SENNOSIDES AND DOCUSATE SODIUM 2 TABLET: 50; 8.6 TABLET ORAL at 20:10

## 2021-10-21 RX ADMIN — INSULIN GLARGINE 28 UNITS: 100 INJECTION, SOLUTION SUBCUTANEOUS at 20:17

## 2021-10-21 ASSESSMENT — ENCOUNTER SYMPTOMS
DIZZINESS: 0
NAUSEA: 1
CONSTIPATION: 0
ABDOMINAL PAIN: 1
PALPITATIONS: 0
VOMITING: 1
DIARRHEA: 0
HEADACHES: 0
CHILLS: 0
FEVER: 0
SORE THROAT: 0
MYALGIAS: 0
SHORTNESS OF BREATH: 0
COUGH: 0

## 2021-10-21 ASSESSMENT — PATIENT HEALTH QUESTIONNAIRE - PHQ9
1. LITTLE INTEREST OR PLEASURE IN DOING THINGS: NOT AT ALL
2. FEELING DOWN, DEPRESSED, IRRITABLE, OR HOPELESS: NOT AT ALL
SUM OF ALL RESPONSES TO PHQ9 QUESTIONS 1 AND 2: 0

## 2021-10-21 ASSESSMENT — FIBROSIS 4 INDEX: FIB4 SCORE: 1.93

## 2021-10-21 ASSESSMENT — PAIN DESCRIPTION - PAIN TYPE: TYPE: ACUTE PAIN

## 2021-10-21 NOTE — ED PROVIDER NOTES
"ED Provider Note    Primary care provider: Jarrell Yates M.D.  Means of arrival: private vehicle  History obtained from: patient  History limited by: none    CHIEF COMPLAINT  Chief Complaint   Patient presents with   • Pain     Liver failure, \"I think I have a blockage\".   • N/V     Vomited X 3 last night.        HPI  Anu Manuel is a 66 y.o. female who presents to the Emergency Department for evaluation of nausea, vomiting in the setting of liver failure. Patient reports that for the last 2 months she has been dealing with a new diagnosis of liver failure. 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 She is also followed by Dr. Zavala in [place]. Patient reports for the last 3 days she has had nausea and vomiting with associated abdominal pain. Her pain seems to be at her baseline but she has not been able to tolerate oral intake. The pain is diffuse abdominal pain worse in the right upper quadrant that is aching and moderate in severity. Denies fevers or chills.    REVIEW OF SYSTEMS  Review of Systems   Constitutional: Negative for chills and fever.   Respiratory: Negative for shortness of breath.    Cardiovascular: Negative for chest pain.   Gastrointestinal: Positive for abdominal pain, nausea and vomiting.   All other systems reviewed and are negative.        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    SURGICAL HISTORY   has a past surgical history that includes hysterectomy, vaginal (2006); thyroid lobectomy (1973); lumpectomy (1976, 2005); cervical disk and fusion anterior (03/12/08); tonsillectomy (1963); cervical fusion posterior (1/16/2009); hardware removal neuro (1/16/2009); neck exploration (1/16/2009); abdominal hysterectomy total; lumpectomy; lumbar laminectomy diskectomy (Right, 5/10/2016); shoulder decompression arthroscopic (6/17/08); clavicle distal excision (6/17/08); shoulder arthroscopy w/ rotator cuff repair (10/9/08); njx aa&/strd tfrml epi lumbar/sacral 1 level (Right, 8/31/2016); spinal cord stimulator (N/A, 10/26/2018); thoracic laminectomy (N/A, 10/26/2018); appendectomy (2004); implant neurostim/ (N/A, 12/16/2019); irrigation & debridement neuro (1/19/2020); and cath placement (1/25/2020).    SOCIAL HISTORY  Social History     Tobacco Use   • Smoking status: Current Every Day Smoker     Packs/day: 0.50     Years: 30.00     Pack years: 15.00     Types: Cigarettes   • Smokeless tobacco: Never Used   Vaping Use   • Vaping Use: Never used   Substance Use Topics   • Alcohol use: Yes   • Drug use: Yes     Comment: Marijuana      Social History     Substance and Sexual Activity   Drug Use Yes    Comment: Marijuana       FAMILY HISTORY  Family History   Problem Relation Age of Onset   • Hypertension Mother    • Cancer Father        CURRENT MEDICATIONS  Home Medications     Reviewed by Stone Martínez (Pharmacy Tech) on 10/21/21 at 1803  Med List Status: Complete   Medication Last Dose Status   ALPRAZolam (XANAX) 0.5 MG Tab 10/20/2021 Active   amLODIPine (NORVASC) 10 MG Tab 10/20/2021 Active   Ascorbic Acid (VITAMIN C) 250 MG Tab 10/21/2021 Active   aspirin 81 MG EC tablet 10/21/2021 Active   B Complex Vitamins (VITAMIN B COMPLEX PO) 10/21/2021 Active   Continuous Blood Gluc Sensor (FREESTYLE PARISA 14 DAY SENSOR) [facility]  Active  "  Docusate Calcium (STOOL SOFTENER PO) 10/21/2021 Active   ezetimibe (ZETIA) 10 MG Tab 10/20/2021 Active   famotidine (PEPCID) 20 MG Tab 10/21/2021 Active   insulin aspart (NOVOLOG FLEXPEN) 100 UNIT/ML injection PEN 10/20/2021 Active   levothyroxine (SYNTHROID) 25 MCG Tab 10/21/2021 Active   metoprolol (TOPROL-XL) 200 MG XL tablet 10/20/2021 Active   naloxone (NARCAN) 0.4 MG/ML Solution PRN Active   ondansetron (ZOFRAN ODT) 4 MG TABLET DISPERSIBLE 10/20/2021 Active   oxycodone 5 MG capsule 10/21/2021 Active   pantoprazole (PROTONIX) 40 MG Tablet Delayed Response 10/21/2021 Active   pyridoxine (VITAMIN B-6) 50 MG Tab 10/21/2021 Active   sertraline (ZOLOFT) 100 MG Tab 10/20/2021 Active   SYNTHROID 200 MCG Tab 10/21/2021 Active   traZODone (DESYREL) 100 MG Tab LAST WEEK Active   TRESIBA FLEXTOUCH 100 UNIT/ML Solution Pen-injector 10/20/2021 Active   ursodiol (ACTIGALL) 300 MG Cap 10/21/2021 Active   vitamin D3 (QC VITAMIN D3) 5000 Unit (125 mcg) Tab 10/21/2021 Active                ALLERGIES  Allergies   Allergen Reactions   • Tape Unspecified     Blisters, paper tape is ok       PHYSICAL EXAM  VITAL SIGNS: /82   Pulse 66   Temp 36.8 °C (98.3 °F) (Oral)   Resp 18   Ht 1.676 m (5' 6\")   Wt 71.3 kg (157 lb 3 oz)   LMP 04/29/2005 (LMP Unknown)   SpO2 95%   BMI 25.37 kg/m²   Vitals reviewed by myself.  Physical Exam  Constitutional:       General: She is not in acute distress.     Appearance: Normal appearance.   HENT:      Head: Normocephalic and atraumatic.   Cardiovascular:      Rate and Rhythm: Normal rate and regular rhythm.      Pulses: Normal pulses.      Heart sounds: Normal heart sounds.   Abdominal:      Comments: No point tenderness to palpation, she is nonperitoneal abdomen is soft   Musculoskeletal:         General: Normal range of motion.      Cervical back: Normal range of motion and neck supple.   Skin:     General: Skin is warm and dry.   Neurological:      General: No focal deficit present. "      Mental Status: She is alert and oriented to person, place, and time.           DIAGNOSTIC STUDIES /  LABS  Labs Reviewed   CBC WITH DIFFERENTIAL - Abnormal; Notable for the following components:       Result Value    RBC 2.71 (*)     Hemoglobin 8.9 (*)     Hematocrit 28.5 (*)     .2 (*)     MCHC 31.2 (*)     RDW 62.6 (*)     Neutrophils-Polys 78.40 (*)     Lymphocytes 9.40 (*)     Neutrophils (Absolute) 8.12 (*)     Lymphs (Absolute) 0.97 (*)     Monos (Absolute) 0.98 (*)     All other components within normal limits   COMP METABOLIC PANEL - Abnormal; Notable for the following components:    Sodium 126 (*)     Chloride 92 (*)     Glucose 255 (*)     AST(SGOT) 119 (*)     ALT(SGPT) 94 (*)     Alkaline Phosphatase 2110 (*)     Total Bilirubin 9.9 (*)     Albumin 2.2 (*)     Globulin 4.1 (*)     All other components within normal limits   URINALYSIS - Abnormal; Notable for the following components:    Color Brown (*)     Character Hazy (*)     Glucose 100 (*)     Ketones 15 (*)     Protein >=300 (*)     Bilirubin Large (*)     Leukocyte Esterase Trace (*)     Occult Blood Small (*)     All other components within normal limits   URINE MICROSCOPIC (W/UA) - Abnormal; Notable for the following components:    WBC 5-10 (*)     RBC 2-5 (*)     Bacteria Few (*)     All other components within normal limits   LIPASE   ESTIMATED GFR   PROTHROMBIN TIME    Narrative:     Indicate which anticoagulants the patient is on:->UNKNOWN   URINE SODIUM RANDOM   URINE POTASSIUM RANDOM   URINE CHLORIDE RANDOM   URINE CREATININE RANDOM   COMP METABOLIC PANEL   MAGNESIUM       All labs reviewed by me.      RADIOLOGY  US-RUQ   Final Result         1. Echogenic liver, most commonly hepatic steatosis.      2. Cholelithiasis. Mildly prominent gallbladder wall thickness, likely due to underdistention.      3. Trace hepatic ascites.           The radiologist's interpretation of all radiological studies have been reviewed by  me.      COURSE & MEDICAL DECISION MAKING  Nursing notes, VS, PMSFHx reviewed in chart.    Patient is a 66-year-old female who comes in for evaluation of nausea and vomiting. Differential diagnosis includes electrolyte disturbance, dehydration, worsening liver disease. Diagnostic work-up includes labs and right upper quadrant ultrasound.    Patient's initial vitals are within normal limits, clinically she appears dehydrated therefore she is treated with IV fluids. Her abdominal pain is managed with morphine. Patient's labs returned are notable for elevated LFTs consistent with her prior elevated LFTs. However patient sodium is noted to be 126 which is decreased for her. Patient does have a meld score of 24 however at this time I do not believe she requires acute hepatology intervention as she has follow-up with her hepatologist in a couple months. Patient does require electrolyte correction and hydration and therefore will be hospitalized for this. Discussed the case with Dr. Flannery who has accepted patient for hospitalization. Patient is in guarded condition.    HYDRATION: Based on the patient's presentation of Acute Vomiting, Dehydration and Inability to take oral fluids the patient was given IV fluids. IV Hydration was used because oral hydration was not adequate alone. Upon recheck following hydration, the patient was improved.        FINAL IMPRESSION  Hyponatremia  Nausea and vomiting

## 2021-10-21 NOTE — ED NOTES
Iv labs done, Pt medicated as directed by ER md, poc update given to pt. Further orders and dispo pending at this time. No further questions from pt at this time

## 2021-10-21 NOTE — ED NOTES
Additional labs drawn, Pt medicated as directed by KIMBERLY anderson, poc update given to pt. Further orders and dispo pending at this time. No further questions from pt at this time

## 2021-10-21 NOTE — ED TRIAGE NOTES
"Chief Complaint   Patient presents with   • Pain     Liver failure, \"I think I have a blockage\".   • N/V     Vomited X 3 last night.    Emotional in traige  "

## 2021-10-22 VITALS
HEIGHT: 66 IN | RESPIRATION RATE: 18 BRPM | BODY MASS INDEX: 25.26 KG/M2 | TEMPERATURE: 97.5 F | DIASTOLIC BLOOD PRESSURE: 78 MMHG | WEIGHT: 157.19 LBS | OXYGEN SATURATION: 97 % | SYSTOLIC BLOOD PRESSURE: 145 MMHG | HEART RATE: 65 BPM

## 2021-10-22 LAB
ALBUMIN SERPL BCP-MCNC: 1.9 G/DL (ref 3.2–4.9)
ALBUMIN/GLOB SERPL: 0.5 G/DL
ALP SERPL-CCNC: 1833 U/L (ref 30–99)
ALT SERPL-CCNC: 80 U/L (ref 2–50)
ANION GAP SERPL CALC-SCNC: 12 MMOL/L (ref 7–16)
AST SERPL-CCNC: 112 U/L (ref 12–45)
BILIRUB SERPL-MCNC: 7.4 MG/DL (ref 0.1–1.5)
BUN SERPL-MCNC: 10 MG/DL (ref 8–22)
CALCIUM SERPL-MCNC: 8.3 MG/DL (ref 8.4–10.2)
CHLORIDE SERPL-SCNC: 98 MMOL/L (ref 96–112)
CO2 SERPL-SCNC: 21 MMOL/L (ref 20–33)
CREAT SERPL-MCNC: 0.74 MG/DL (ref 0.5–1.4)
GLOBULIN SER CALC-MCNC: 3.7 G/DL (ref 1.9–3.5)
GLUCOSE BLD-MCNC: 223 MG/DL (ref 65–99)
GLUCOSE BLD-MCNC: 32 MG/DL (ref 65–99)
GLUCOSE BLD-MCNC: 43 MG/DL (ref 65–99)
GLUCOSE BLD-MCNC: 53 MG/DL (ref 65–99)
GLUCOSE BLD-MCNC: 55 MG/DL (ref 65–99)
GLUCOSE BLD-MCNC: 78 MG/DL (ref 65–99)
GLUCOSE BLD-MCNC: 80 MG/DL (ref 65–99)
GLUCOSE SERPL-MCNC: 59 MG/DL (ref 65–99)
MAGNESIUM SERPL-MCNC: 1.9 MG/DL (ref 1.5–2.5)
POTASSIUM SERPL-SCNC: 3.7 MMOL/L (ref 3.6–5.5)
PROT SERPL-MCNC: 5.6 G/DL (ref 6–8.2)
SODIUM SERPL-SCNC: 131 MMOL/L (ref 135–145)

## 2021-10-22 PROCEDURE — 700102 HCHG RX REV CODE 250 W/ 637 OVERRIDE(OP): Performed by: STUDENT IN AN ORGANIZED HEALTH CARE EDUCATION/TRAINING PROGRAM

## 2021-10-22 PROCEDURE — 36415 COLL VENOUS BLD VENIPUNCTURE: CPT

## 2021-10-22 PROCEDURE — 99239 HOSP IP/OBS DSCHRG MGMT >30: CPT | Performed by: HOSPITALIST

## 2021-10-22 PROCEDURE — 80053 COMPREHEN METABOLIC PANEL: CPT

## 2021-10-22 PROCEDURE — 700105 HCHG RX REV CODE 258: Performed by: STUDENT IN AN ORGANIZED HEALTH CARE EDUCATION/TRAINING PROGRAM

## 2021-10-22 PROCEDURE — 700102 HCHG RX REV CODE 250 W/ 637 OVERRIDE(OP): Performed by: INTERNAL MEDICINE

## 2021-10-22 PROCEDURE — 83735 ASSAY OF MAGNESIUM: CPT

## 2021-10-22 PROCEDURE — 82962 GLUCOSE BLOOD TEST: CPT

## 2021-10-22 PROCEDURE — A9270 NON-COVERED ITEM OR SERVICE: HCPCS | Performed by: STUDENT IN AN ORGANIZED HEALTH CARE EDUCATION/TRAINING PROGRAM

## 2021-10-22 PROCEDURE — A9270 NON-COVERED ITEM OR SERVICE: HCPCS | Performed by: INTERNAL MEDICINE

## 2021-10-22 PROCEDURE — 700111 HCHG RX REV CODE 636 W/ 250 OVERRIDE (IP): Performed by: STUDENT IN AN ORGANIZED HEALTH CARE EDUCATION/TRAINING PROGRAM

## 2021-10-22 RX ORDER — DIPHENHYDRAMINE HCL 25 MG
25 CAPSULE ORAL EVERY 6 HOURS PRN
Qty: 30 CAPSULE | Refills: 2 | Status: SHIPPED | OUTPATIENT
Start: 2021-10-22 | End: 2021-11-07

## 2021-10-22 RX ORDER — URSODIOL 300 MG/1
300 CAPSULE ORAL DAILY
Qty: 30 CAPSULE | Refills: 0 | Status: SHIPPED | OUTPATIENT
Start: 2021-10-22 | End: 2022-07-10

## 2021-10-22 RX ORDER — FENTANYL 25 UG/1
1 PATCH TRANSDERMAL
Qty: 5 PATCH | Refills: 0 | Status: ON HOLD | OUTPATIENT
Start: 2021-10-22 | End: 2021-11-03

## 2021-10-22 RX ADMIN — NICOTINE TRANSDERMAL SYSTEM 21 MG: 21 PATCH, EXTENDED RELEASE TRANSDERMAL at 05:06

## 2021-10-22 RX ADMIN — METOPROLOL SUCCINATE 200 MG: 100 TABLET, EXTENDED RELEASE ORAL at 05:08

## 2021-10-22 RX ADMIN — Medication 16 G: at 12:23

## 2021-10-22 RX ADMIN — URSODIOL 300 MG: 300 CAPSULE ORAL at 11:06

## 2021-10-22 RX ADMIN — ENOXAPARIN SODIUM 40 MG: 40 INJECTION SUBCUTANEOUS at 05:10

## 2021-10-22 RX ADMIN — SENNOSIDES AND DOCUSATE SODIUM 2 TABLET: 50; 8.6 TABLET ORAL at 05:08

## 2021-10-22 RX ADMIN — LEVOTHYROXINE SODIUM 200 MCG: 0.1 TABLET ORAL at 05:08

## 2021-10-22 RX ADMIN — LEVOTHYROXINE SODIUM 25 MCG: 0.03 TABLET ORAL at 05:08

## 2021-10-22 RX ADMIN — SERTRALINE 100 MG: 50 TABLET, FILM COATED ORAL at 05:07

## 2021-10-22 RX ADMIN — OXYCODONE HYDROCHLORIDE 10 MG: 10 TABLET ORAL at 04:35

## 2021-10-22 RX ADMIN — OXYCODONE HYDROCHLORIDE 10 MG: 10 TABLET ORAL at 12:19

## 2021-10-22 RX ADMIN — ASPIRIN 81 MG: 81 TABLET, COATED ORAL at 05:08

## 2021-10-22 RX ADMIN — SODIUM CHLORIDE: 9 INJECTION, SOLUTION INTRAVENOUS at 01:47

## 2021-10-22 ASSESSMENT — COGNITIVE AND FUNCTIONAL STATUS - GENERAL
SUGGESTED CMS G CODE MODIFIER MOBILITY: CH
DAILY ACTIVITIY SCORE: 24
SUGGESTED CMS G CODE MODIFIER DAILY ACTIVITY: CH
MOBILITY SCORE: 24

## 2021-10-22 ASSESSMENT — LIFESTYLE VARIABLES
TOTAL SCORE: 0
ON A TYPICAL DAY WHEN YOU DRINK ALCOHOL HOW MANY DRINKS DO YOU HAVE: 0
ALCOHOL_USE: NO
HAVE YOU EVER FELT YOU SHOULD CUT DOWN ON YOUR DRINKING: NO
CONSUMPTION TOTAL: NEGATIVE
EVER HAD A DRINK FIRST THING IN THE MORNING TO STEADY YOUR NERVES TO GET RID OF A HANGOVER: NO
TOTAL SCORE: 0
EVER FELT BAD OR GUILTY ABOUT YOUR DRINKING: NO
TOTAL SCORE: 0
AVERAGE NUMBER OF DAYS PER WEEK YOU HAVE A DRINK CONTAINING ALCOHOL: 0
HOW MANY TIMES IN THE PAST YEAR HAVE YOU HAD 5 OR MORE DRINKS IN A DAY: 0
HAVE PEOPLE ANNOYED YOU BY CRITICIZING YOUR DRINKING: NO

## 2021-10-22 ASSESSMENT — PAIN DESCRIPTION - PAIN TYPE
TYPE: ACUTE PAIN

## 2021-10-22 NOTE — CARE PLAN
The patient is Stable - Low risk of patient condition declining or worsening    Shift Goals  Clinical Goals: pain management, monitor BS   Patient Goals: comfort, rest     Progress made toward(s) clinical / shift goals:  Pt has been medicated per MAR for pain control this shift. Monitoring BS.     Patient is not progressing towards the following goals:

## 2021-10-22 NOTE — ASSESSMENT & PLAN NOTE
-Due to liver disease resulting in intense pruritus and multiple scratch marks and bleeding on her arms  -On ursodiol  -PCP tried famotidine but did not work  -Try Benadryl

## 2021-10-22 NOTE — PROGRESS NOTES
COVID 19 surge in effect.     Received report from night shift RN, assumed care of pt. AAOx4. VSS. Pt states pain 5/10. Denies nausea or vomiting this morning. Pt updated on plan of care for today and answered any questions. Safety precautions in place.      0740: BG 32, Pt requesting orange juice and will finish breakfast. Pt asymptomatic.   0820: BG 80. MD updated, no new orders. Will continue to monitor.

## 2021-10-22 NOTE — ED NOTES
Pharmacy Medication Reconciliation      ~Medication reconciliation updated and complete per pt at bedside with medication list. Reviewed list with pt and returned at bedside  ~Allergies have been verified and updated   ~No oral ABX within the last 30 days  ~Patient home pharmacy:[facility]

## 2021-10-22 NOTE — PROGRESS NOTES
Blood glucose on morning labs was 59.  Patient given 2 orange juice and accu check 78.  Patient given cassia crackers per patient request.  Will recheck BS before breakfast.

## 2021-10-22 NOTE — ASSESSMENT & PLAN NOTE
-Elevated but less than prior admission.  Extensive work-up here in the [facility] with hepatology  -Etiology unknown, possible autoimmune  -On ursodiol

## 2021-10-22 NOTE — ASSESSMENT & PLAN NOTE
-Would expect her to have baseline hyponatremia due to liver disease, 126 on admission likely due to dehydration, as patient has been vomiting and she appears hypovolemic  -IV fluids  -Urine studies ordered

## 2021-10-22 NOTE — H&P
"Hospital Medicine History & Physical Note    Date of Service  10/21/2021    Primary Care Physician  Jarrell Yates M.D.    Code Status  DNAR/DNI    Chief Complaint  Chief Complaint   Patient presents with   • Pain     Liver failure, \"I think I have a blockage\".   • N/V     Vomited X 3 last night.        History of Presenting Illness  Anu Manuel is a 66 y.o. female who presented 10/21/2021 with nausea, vomiting, abdominal pain.  He has a past medical history of insulin-dependent juvenile diabetes, CAD, anxiety, hypertension, hypothyroidism.  Patient with hepatic failure, jaundice of unknown etiology.  She was evaluated by hepatologist at [facility], work-up so far negative, they are suspecting autoimmune hepatitis she was started on ursodiol.  Comes in today due to nausea, vomiting, severe abdominal pain.  States she was discharged with 5 mg of oxycodone from [facility] but that it only minimally helps her pain.  Patient states she is in severe pain and unable to keep anything down.  No history of heavy alcohol use.  States she was also given antacids for her abdominal pain but they have not been doing anything for her at all.    In the ED patient hemoglobin 8.9 baseline, hyponatremia at 126, alk phos at 2110, total bilirubin 9.9.  Ultrasound right upper quadrant shows echogenic liver most commonly due to hepatic steatosis, cholelithiasis but no cholecystitis and trace ascites.    I discussed the plan of care with patient and bedside RN.    Review of Systems  Review of Systems   Constitutional: Positive for malaise/fatigue. Negative for chills and fever.   HENT: Negative for sore throat.    Respiratory: Negative for cough and shortness of breath.    Cardiovascular: Negative for chest pain and palpitations.   Gastrointestinal: Positive for abdominal pain, nausea and vomiting. Negative for constipation and diarrhea.   Genitourinary: Negative for dysuria, frequency and urgency.   Musculoskeletal: Negative for " myalgias.   Neurological: Negative for dizziness and headaches.   All other systems reviewed and are negative.      Past Medical History   has a past medical history of Adverse effect of anesthesia, Anesthesia, Arthritis, Cigarette smoker (quit 2013), Dental disorder, depression (8/30/2016), Diabetes mellitus type 1 (HCC) (1989), Encounter for long-term (current) use of insulin ([facility]) (9/25/2013), Heart burn, High cholesterol, Hypertension, Hypothyroidism, postsurgical (1970), Indigestion, Infectious disease, Joint replacement, Pain, Polyneuropathy in diabetes(357.2) (6/10/2015), Status post appendectomy, and 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    Surgical History   has a past surgical history that includes hysterectomy, vaginal (2006); thyroid lobectomy (1973); lumpectomy (1976, 2005); cervical disk and fusion anterior (03/12/08); tonsillectomy (1963); cervical fusion posterior (1/16/2009); hardware removal neuro (1/16/2009); neck exploration (1/16/2009); abdominal hysterectomy total; lumpectomy; lumbar laminectomy diskectomy (Right, 5/10/2016); shoulder decompression arthroscopic (6/17/08); clavicle distal excision (6/17/08); shoulder arthroscopy w/ rotator cuff repair (10/9/08); pr njx aa&/strd tfrml epi lumbar/sacral 1 level (Right, 8/31/2016); spinal cord stimulator (N/A, 10/26/2018); thoracic laminectomy (N/A, 10/26/2018); appendectomy (2004); pr implant neurostim/ (N/A, 12/16/2019); irrigation & debridement neuro (1/19/2020); and cath placement (1/25/2020).     Family History  family history includes Cancer in her father; Hypertension in her mother.   Family history reviewed with patient. There is no family history that is pertinent to the chief complaint.     Social History   reports that she has been smoking cigarettes. She has a 15.00 pack-year smoking history. She has never used smokeless tobacco. She reports current alcohol  use. She reports current drug use.    Allergies  Allergies   Allergen Reactions   • Tape Unspecified     Blisters, paper tape is ok       Medications  Prior to Admission Medications   Prescriptions Last Dose Informant Patient Reported? Taking?   ALPRAZolam (XANAX) 0.5 MG Tab 10/20/2021 at HS Patient Yes No   Sig: Take 0.5 mg by mouth at bedtime.   Ascorbic Acid (VITAMIN C) 250 MG Tab 10/21/2021 at 0700 Patient Yes Yes   Sig: Take 250 mg by mouth every morning.   B Complex Vitamins (VITAMIN B COMPLEX PO) 10/21/2021 at 0700 Patient Yes Yes   Sig: Take 1 Tablet by mouth every morning.   Continuous Blood Gluc Sensor (FREESTYLE PARISA 14 DAY SENSOR) [facility]  Patient Yes No   Si MISCELLANEOUS E10.42 CHANGE SENSOR EVERY 14 DAYS   E11.65   Docusate Calcium (STOOL SOFTENER PO) 10/21/2021 at 0700 Patient Yes Yes   Sig: Take 1 Tablet by mouth every morning.   SYNTHROID 200 MCG Tab 10/21/2021 at 0700 Patient Yes No   Sig: Take 200 mcg by mouth every morning. 200mcg tablet + 25mcg tablet for a total dose of 225mcg   TRESIBA FLEXTOUCH 100 UNIT/ML Solution Pen-injector 10/20/2021 at PM Patient Yes No   Sig: Inject 24 Units under the skin every evening.   amLODIPine (NORVASC) 10 MG Tab 10/20/2021 at PM Patient Yes No   Sig: Take 5 mg by mouth every evening.   aspirin 81 MG EC tablet 10/21/2021 at 0700 Patient No No   Sig: Take 1 Tab by mouth every morning.   ezetimibe (ZETIA) 10 MG Tab 10/20/2021 at PM Patient Yes No   Sig: Take 10 mg by mouth every evening.   famotidine (PEPCID) 20 MG Tab 10/21/2021 at 0700 Patient Yes Yes   Sig: Take 20 mg by mouth 2 times a day.   insulin aspart (NOVOLOG FLEXPEN) 100 UNIT/ML injection PEN 10/20/2021 at PM Patient Yes Yes   Sig: Inject 1-5 Units under the skin in the morning, at noon, and at bedtime. 1 units for every 5 g of carbs   AND  Sliding Scale   150 - 200 = 1 units  201 - 250 = 2 units  251 - 300 = 3 units  301 - 350 = 4 units  351 - 400 = 5 units   levothyroxine (SYNTHROID) 25 MCG Tab  10/21/2021 at 0700 Patient Yes Yes   Sig: Take 25 mcg by mouth every morning on an empty stomach. 25mcg tablet + 200mcg tablet for a total dose of 225mcg   metoprolol (TOPROL-XL) 200 MG XL tablet 10/20/2021 at PM Patient No No   Sig: Take 1 tablet by mouth every day.   naloxone (NARCAN) 0.4 MG/ML Solution PRN at PRN Patient Yes No   Sig: Infuse 0.4 mg into a venous catheter one time.   ondansetron (ZOFRAN ODT) 4 MG TABLET DISPERSIBLE 10/20/2021 at PM Patient No No   Sig: Take 1 Tablet by mouth every 6 hours as needed for Nausea.   oxycodone 5 MG capsule 10/21/2021 at 0200 Patient Yes No   Sig: Take 5 mg by mouth every four hours as needed for Severe Pain.   pantoprazole (PROTONIX) 40 MG Tablet Delayed Response 10/21/2021 at 0700 Patient Yes No   Sig: Take 40 mg by mouth every morning.   pyridoxine (VITAMIN B-6) 50 MG Tab 10/21/2021 at 0700 Patient Yes Yes   Sig: Take 50 mg by mouth every morning.   sertraline (ZOLOFT) 100 MG Tab 10/20/2021 at PM Patient Yes No   Sig: Take 100 mg by mouth every evening.   traZODone (DESYREL) 100 MG Tab LAST WEEK at PRN Patient Yes Yes   Sig: Take 100 mg by mouth at bedtime as needed for Sleep.   ursodiol (ACTIGALL) 300 MG Cap 10/21/2021 at 0700 Patient Yes Yes   Sig: Take 300 mg by mouth in the morning, at noon, and at bedtime.   vitamin D3 (QC VITAMIN D3) 5000 Unit (125 mcg) Tab 10/21/2021 at 0700 Patient Yes Yes   Sig: Take 5,000 Units by mouth 2 times a day.      Facility-Administered Medications: None       Physical Exam  Temp:  [36.8 °C (98.3 °F)] 36.8 °C (98.3 °F)  Pulse:  [66-68] 66  Resp:  [18-20] 18  BP: (122-138)/(69-82) 138/82  SpO2:  [95 %-99 %] 95 %  Blood Pressure: 138/82   Temperature: 36.8 °C (98.3 °F)   Pulse: 66   Respiration: 18   Pulse Oximetry: 95 %       Physical Exam  Constitutional:       General: She is in acute distress.      Appearance: Normal appearance.   HENT:      Head: Normocephalic and atraumatic.      Mouth/Throat:      Mouth: Mucous membranes are  dry.      Pharynx: Oropharynx is clear. No oropharyngeal exudate or posterior oropharyngeal erythema.   Eyes:      General: Scleral icterus present.   Cardiovascular:      Rate and Rhythm: Normal rate and regular rhythm.      Heart sounds: Normal heart sounds. No murmur heard.     Abdominal:      General: Abdomen is flat. There is no distension.      Palpations: Abdomen is soft.   Skin:     Coloration: Skin is jaundiced.      Comments: Scratch marks with bleeding on arms   Neurological:      General: No focal deficit present.      Mental Status: She is alert and oriented to person, place, and time. Mental status is at baseline.   Psychiatric:         Mood and Affect: Mood normal.         Laboratory:  Recent Labs     10/21/21  1330   WBC 10.3   RBC 2.71*   HEMOGLOBIN 8.9*   HEMATOCRIT 28.5*   .2*   MCH 32.8   MCHC 31.2*   RDW 62.6*   PLATELETCT 429   MPV 11.1     Recent Labs     10/21/21  1330   SODIUM 126*   POTASSIUM 3.7   CHLORIDE 92*   CO2 21   GLUCOSE 255*   BUN 12   CREATININE 0.73   CALCIUM 8.4     Recent Labs     10/21/21  1330   ALTSGPT 94*   ASTSGOT 119*   ALKPHOSPHAT 2110*   TBILIRUBIN 9.9*   LIPASE 8   GLUCOSE 255*     Recent Labs     10/21/21  1330   INR 1.00     No results for input(s): NTPROBNP in the last 72 hours.      No results for input(s): TROPONINT in the last 72 hours.    Imaging:  US-RUQ   Final Result         1. Echogenic liver, most commonly hepatic steatosis.      2. Cholelithiasis. Mildly prominent gallbladder wall thickness, likely due to underdistention.      3. Trace hepatic ascites.             no X-Ray or EKG requiring interpretation    Assessment/Plan:  I anticipate this patient is appropriate for observation status at this time.    * Hyponatremia- (present on admission)  Assessment & Plan  -Would expect her to have baseline hyponatremia due to liver disease, 126 on admission likely due to dehydration, as patient has been vomiting and she appears hypovolemic  -IV  fluids  -Urine studies ordered    Hyperbilirubinemia- (present on admission)  Assessment & Plan  -Due to liver disease resulting in intense pruritus and multiple scratch marks and bleeding on her arms  -On ursodiol  -PCP tried famotidine but did not work  -Try Benadryl    Bilious vomiting with nausea- (present on admission)  Assessment & Plan  -Resulting in dehydration.  Likely due to liver disease  -As needed ondansetron, IV fluids    Dehydration- (present on admission)  Assessment & Plan  -Secondary to nausea, vomiting  -Hydrate with IV fluids slowly due to liver failure    Elevated liver enzymes- (present on admission)  Assessment & Plan  -Elevated but less than prior admission.  Extensive work-up here in the [facility] with hepatology  -Etiology unknown, possible autoimmune  -On ursodiol    CAD S/P percutaneous coronary angioplasty- (present on admission)  Assessment & Plan  -Continue aspirin.   -Patient not on statin, statins have been shown to help in patients with liver disease, needs to be discussed as outpatient    Anxiety- (present on admission)  Assessment & Plan  Continue alprazolam and sertraline    Hypertension- (present on admission)  Assessment & Plan  Continue amlodipine and metoprolol    Type 1 diabetes mellitus with neurological manifestations, uncontrolled (HCC)- (present on admission)  Assessment & Plan  -Continue long-acting, premeal, sliding scale insulin  -Hypoglycemia protocol    Hypothyroidism, postsurgical- (present on admission)  Assessment & Plan  Continue levothyroxine      VTE prophylaxis: enoxaparin ppx

## 2021-10-22 NOTE — DISCHARGE INSTRUCTIONS
Discharge Instructions    Discharged to home by car with relative. Discharged via wheelchair, hospital escort: Yes.  Special equipment needed: Not Applicable    Be sure to schedule a follow-up appointment with your primary care doctor or any specialists as instructed.     Discharge Plan:   Influenza Vaccine Indication: Not indicated: Previously immunized this influenza season and > 8 years of age    I understand that a diet low in cholesterol, fat, and sodium is recommended for good health. Unless I have been given specific instructions below for another diet, I accept this instruction as my diet prescription.   Other diet: Diabetic diet     Special Instructions: None    · Is patient discharged on Warfarin / Coumadin?   No     Depression / Suicide Risk    As you are discharged from this [facility] facility, it is important to learn how to keep safe from harming yourself.    Recognize the warning signs:  · Abrupt changes in personality, positive or negative- including increase in energy   · Giving away possessions  · Change in eating patterns- significant weight changes-  positive or negative  · Change in sleeping patterns- unable to sleep or sleeping all the time   · Unwillingness or inability to communicate  · Depression  · Unusual sadness, discouragement and loneliness  · Talk of wanting to die  · Neglect of personal appearance   · Rebelliousness- reckless behavior  · Withdrawal from people/activities they love  · Confusion- inability to concentrate     If you or a loved one observes any of these behaviors or has concerns about self-harm, here's what you can do:  · Talk about it- your feelings and reasons for harming yourself  · Remove any means that you might use to hurt yourself (examples: pills, rope, extension cords, firearm)  · Get professional help from the community (Mental Health, Substance Abuse, psychological counseling)  · Do not be alone:Call your Safe Contact- someone whom you trust who will be  there for you.  · Call your local CRISIS HOTLINE 418-8562 or 746-380-8369  · Call your local Children's Mobile Crisis Response Team [place] (442) 495-5290 or www.Mama  · Call the toll free National Suicide Prevention Hotlines   · National Suicide Prevention Lifeline 211-512-EDUA (3150)  · National [employer] Network 800-SUICIDE (803-1420)

## 2021-10-22 NOTE — DISCHARGE SUMMARY
"Discharge Summary    CHIEF COMPLAINT ON ADMISSION  Chief Complaint   Patient presents with   • Pain     Liver failure, \"I think I have a blockage\".   • N/V     Vomited X 3 last night.        Reason for Admission  Abdominal Pain     Admission Date  10/21/2021    CODE STATUS  DNAR/DNI    HPI & HOSPITAL COURSE  Mrs. Anu Manuel is a 66-year-old female who was admitted because of uncontrolled pain.  Patient was recently seen at [facility] for possible autoimmune hepatitis although formal diagnosis has not been provided.  Patient was then discharged home with 5 mg of oxycodone every 6 hours, usual dial for itching, and follow-ups in December.  Patient at this point in time has not controlled her pain well she was thus admitted for pain control.  Here in house the patient reports no pain.  Her blood sugars have been low ever since she was started on ursodiol.  I start to gastroenterology Dr. Pandya and she recommends placing the patient on just daily ursodiol thus this change has been made.  Patient will also be treated with Benadryl for the itching.  Her liver enzymes are coming down.  Gastroenterology recommends evaluation for possible cholecystectomy.  I spoke to Dr. Harriett Wang and at this point surgery does not recommend removing the gallbladder.  For pain management we are starting the patient on a fentanyl patch 25 mcg.  She will also continue with her oxycodone that she was prescribed from [facility].  Patient will need at this point follow-ups with Dr. Zavala of gastroenterology and Dr. Jarrell Yates, her primary care physician, to undertake pain management of this patient at a higher level.  This has been discussed with the patient as well as her daughter.  Patient currently is in a stable condition, pain-free, she is alert awake oriented x3, pleasant cooperative female understands her discharge instructions.    Therefore, she is discharged in good and stable condition to home with close outpatient " follow-up.    The patient recovered much more quickly than anticipated on admission.    Discharge Date  10/23/2021    FOLLOW UP ITEMS POST DISCHARGE  Follow-up with Dr. Jarrell Yates next week  Follow-up with Dr. Zavala next week    DISCHARGE DIAGNOSES  Principal Problem:    Hyponatremia POA: Yes  Active Problems:    Hypothyroidism, postsurgical POA: Yes    Type 1 diabetes mellitus with neurological manifestations, uncontrolled (HCC) POA: Yes      Overview: ICD-10 transition    Hypertension POA: Yes    Anxiety POA: Yes    CAD S/P percutaneous coronary angioplasty POA: Yes    Elevated liver enzymes POA: Yes    Dehydration POA: Yes    Bilious vomiting with nausea POA: Yes    Hyperbilirubinemia POA: Yes  Resolved Problems:    * No resolved hospital problems. *      FOLLOW UP  No future appointments.  No follow-up provider specified.    MEDICATIONS ON DISCHARGE     Medication List      START taking these medications      Instructions   diphenhydrAMINE 25 MG capsule  Commonly known as: Benadryl Allergy   Take 1 Capsule by mouth every 6 hours as needed for Itching.  Dose: 25 mg     fentaNYL 25 MCG/HR Pt72  Commonly known as: DURAGESIC   Place 1 Patch on the skin every 72 hours for 15 days.  Dose: 1 Patch        CHANGE how you take these medications      Instructions   ursodiol 300 MG Caps  What changed: when to take this  Commonly known as: ACTIGALL   Take 1 Capsule by mouth every day.  Dose: 300 mg        CONTINUE taking these medications      Instructions   ALPRAZolam 0.5 MG Tabs  Commonly known as: XANAX   Take 0.5 mg by mouth at bedtime.  Dose: 0.5 mg     amLODIPine 10 MG Tabs  Commonly known as: NORVASC   Take 5 mg by mouth every evening.  Dose: 5 mg     aspirin 81 MG EC tablet   Take 1 Tab by mouth every morning.  Dose: 81 mg     ezetimibe 10 MG Tabs  Commonly known as: ZETIA   Take 10 mg by mouth every evening.  Dose: 10 mg     famotidine 20 MG Tabs  Commonly known as: PEPCID   Take 20 mg by mouth 2 times a  day.  Dose: 20 mg     FreeStyle John 14 Day Sensor Misc   1 MISCELLANEOUS E10.42 CHANGE SENSOR EVERY 14 DAYS   E11.65     metoprolol 200 MG XL tablet  Commonly known as: TOPROL-XL   Take 1 tablet by mouth every day.  Dose: 200 mg     NovoLOG FlexPen 100 UNIT/ML injection PEN  Generic drug: insulin aspart   Inject 1-5 Units under the skin in the morning, at noon, and at bedtime. 1 units for every 5 g of carbs   AND  Sliding Scale   150 - 200 = 1 units  201 - 250 = 2 units  251 - 300 = 3 units  301 - 350 = 4 units  351 - 400 = 5 units  Dose: 1-5 Units     ondansetron 4 MG Tbdp  Commonly known as: Zofran ODT   Take 1 Tablet by mouth every 6 hours as needed for Nausea.  Dose: 4 mg     oxycodone 5 MG capsule   Take 5 mg by mouth every four hours as needed for Severe Pain.  Dose: 5 mg     pantoprazole 40 MG Tbec  Commonly known as: PROTONIX   Take 40 mg by mouth every morning.  Dose: 40 mg     pyridoxine 50 MG Tabs  Commonly known as: VITAMIN B-6   Take 50 mg by mouth every morning.  Dose: 50 mg     QC Vitamin D3 5000 Unit (125 mcg) Tabs  Generic drug: vitamin D3   Take 5,000 Units by mouth 2 times a day.  Dose: 5,000 Units     sertraline 100 MG Tabs  Commonly known as: Zoloft   Take 100 mg by mouth every evening.  Dose: 100 mg     STOOL SOFTENER PO   Take 1 Tablet by mouth every morning.  Dose: 1 Tablet     * levothyroxine 25 MCG Tabs  Commonly known as: SYNTHROID   Take 25 mcg by mouth every morning on an empty stomach. 25mcg tablet + 200mcg tablet for a total dose of 225mcg  Dose: 25 mcg     * Synthroid 200 MCG Tabs  Generic drug: levothyroxine   Take 200 mcg by mouth every morning. 200mcg tablet + 25mcg tablet for a total dose of 225mcg  Dose: 200 mcg     traZODone 100 MG Tabs  Commonly known as: DESYREL   Take 100 mg by mouth at bedtime as needed for Sleep.  Dose: 100 mg     Tresiba FlexTouch 100 UNIT/ML Sopn  Generic drug: Insulin Degludec   Inject 24 Units under the skin every evening.  Dose: 24 Units     VITAMIN  B COMPLEX PO   Take 1 Tablet by mouth every morning.  Dose: 1 Tablet     vitamin C 250 MG Tabs   Take 250 mg by mouth every morning.  Dose: 250 mg         * This list has 2 medication(s) that are the same as other medications prescribed for you. Read the directions carefully, and ask your doctor or other care provider to review them with you.            STOP taking these medications    naloxone 0.4 MG/ML Soln  Commonly known as: NARCAN            Allergies  Allergies   Allergen Reactions   • Tape Unspecified     Blisters, paper tape is ok       DIET  Orders Placed This Encounter   Procedures   • Diet Order Diet: Regular     Standing Status:   Standing     Number of Occurrences:   1     Order Specific Question:   Diet:     Answer:   Regular [1]       ACTIVITY  As tolerated.  Weight bearing as tolerated    CONSULTATIONS  GI Dr. Pandya    PROCEDURES  None    LABORATORY  Lab Results   Component Value Date    SODIUM 131 (L) 10/22/2021    POTASSIUM 3.7 10/22/2021    CHLORIDE 98 10/22/2021    CO2 21 10/22/2021    GLUCOSE 59 (L) 10/22/2021    BUN 10 10/22/2021    CREATININE 0.74 10/22/2021    CREATININE 1.0 01/08/2009        Lab Results   Component Value Date    WBC 10.3 10/21/2021    HEMOGLOBIN 8.9 (L) 10/21/2021    HEMATOCRIT 28.5 (L) 10/21/2021    PLATELETCT 429 10/21/2021        Total time of the discharge process exceeds 37 minutes.

## 2021-10-22 NOTE — ASSESSMENT & PLAN NOTE
-Continue aspirin.   -Patient not on statin, statins have been shown to help in patients with liver disease, needs to be discussed as outpatient

## 2021-10-22 NOTE — PROGRESS NOTES
COVID-19 surge in effect.    Assumed care or patient at 1900.  Patient is alert and oriented, pleasant and cooperative.  Oxycodone 10mg given for c/o abdominal pain.  Patient is up with steady gait and tolerates activity well.  Patient oriented to room and call light.  Snack given.  Patient states her daughter gave her dinner prior to admit.

## 2021-10-23 ENCOUNTER — HOSPITAL ENCOUNTER (EMERGENCY)
Facility: MEDICAL CENTER | Age: 66
End: 2021-10-23
Attending: EMERGENCY MEDICINE
Payer: MEDICARE

## 2021-10-23 ENCOUNTER — APPOINTMENT (OUTPATIENT)
Dept: RADIOLOGY | Facility: MEDICAL CENTER | Age: 66
End: 2021-10-23
Attending: EMERGENCY MEDICINE
Payer: MEDICARE

## 2021-10-23 VITALS
HEART RATE: 68 BPM | BODY MASS INDEX: 25.26 KG/M2 | OXYGEN SATURATION: 100 % | RESPIRATION RATE: 18 BRPM | HEIGHT: 66 IN | WEIGHT: 157.19 LBS | TEMPERATURE: 97.2 F | DIASTOLIC BLOOD PRESSURE: 78 MMHG | SYSTOLIC BLOOD PRESSURE: 148 MMHG

## 2021-10-23 VITALS
HEIGHT: 66 IN | RESPIRATION RATE: 15 BRPM | HEART RATE: 62 BPM | SYSTOLIC BLOOD PRESSURE: 154 MMHG | OXYGEN SATURATION: 92 % | TEMPERATURE: 98.1 F | BODY MASS INDEX: 25.26 KG/M2 | DIASTOLIC BLOOD PRESSURE: 80 MMHG | WEIGHT: 157.19 LBS

## 2021-10-23 DIAGNOSIS — R17 JAUNDICE: ICD-10-CM

## 2021-10-23 DIAGNOSIS — R10.13 EPIGASTRIC PAIN: ICD-10-CM

## 2021-10-23 DIAGNOSIS — R10.9 ABDOMINAL PAIN, UNSPECIFIED ABDOMINAL LOCATION: ICD-10-CM

## 2021-10-23 DIAGNOSIS — K72.00 ACUTE LIVER FAILURE WITHOUT HEPATIC COMA: ICD-10-CM

## 2021-10-23 LAB
ALBUMIN SERPL BCP-MCNC: 2.4 G/DL (ref 3.2–4.9)
ALBUMIN/GLOB SERPL: 0.6 G/DL
ALP SERPL-CCNC: 1968 U/L (ref 30–99)
ALT SERPL-CCNC: 93 U/L (ref 2–50)
ANION GAP SERPL CALC-SCNC: 9 MMOL/L (ref 7–16)
AST SERPL-CCNC: 148 U/L (ref 12–45)
BASOPHILS # BLD AUTO: 0.9 % (ref 0–1.8)
BASOPHILS # BLD: 0.11 K/UL (ref 0–0.12)
BILIRUB SERPL-MCNC: 8.8 MG/DL (ref 0.1–1.5)
BUN SERPL-MCNC: 14 MG/DL (ref 8–22)
CALCIUM SERPL-MCNC: 8.4 MG/DL (ref 8.4–10.2)
CHLORIDE SERPL-SCNC: 93 MMOL/L (ref 96–112)
CO2 SERPL-SCNC: 23 MMOL/L (ref 20–33)
CREAT SERPL-MCNC: 0.57 MG/DL (ref 0.5–1.4)
EKG IMPRESSION: NORMAL
EOSINOPHIL # BLD AUTO: 0.48 K/UL (ref 0–0.51)
EOSINOPHIL NFR BLD: 4.1 % (ref 0–6.9)
ERYTHROCYTE [DISTWIDTH] IN BLOOD BY AUTOMATED COUNT: 63.7 FL (ref 35.9–50)
GLOBULIN SER CALC-MCNC: 4.1 G/DL (ref 1.9–3.5)
GLUCOSE BLD-MCNC: 159 MG/DL (ref 65–99)
GLUCOSE SERPL-MCNC: 263 MG/DL (ref 65–99)
HCT VFR BLD AUTO: 27.2 % (ref 37–47)
HGB BLD-MCNC: 8.4 G/DL (ref 12–16)
IMM GRANULOCYTES # BLD AUTO: 0.06 K/UL (ref 0–0.11)
IMM GRANULOCYTES NFR BLD AUTO: 0.5 % (ref 0–0.9)
LIPASE SERPL-CCNC: 11 U/L (ref 7–58)
LYMPHOCYTES # BLD AUTO: 1.02 K/UL (ref 1–4.8)
LYMPHOCYTES NFR BLD: 8.7 % (ref 22–41)
MCH RBC QN AUTO: 32.8 PG (ref 27–33)
MCHC RBC AUTO-ENTMCNC: 30.9 G/DL (ref 33.6–35)
MCV RBC AUTO: 106.3 FL (ref 81.4–97.8)
MONOCYTES # BLD AUTO: 1.06 K/UL (ref 0–0.85)
MONOCYTES NFR BLD AUTO: 9 % (ref 0–13.4)
NEUTROPHILS # BLD AUTO: 9.04 K/UL (ref 2–7.15)
NEUTROPHILS NFR BLD: 76.8 % (ref 44–72)
NRBC # BLD AUTO: 0.02 K/UL
NRBC BLD-RTO: 0.2 /100 WBC
PLATELET # BLD AUTO: 357 K/UL (ref 164–446)
PMV BLD AUTO: 11.6 FL (ref 9–12.9)
POTASSIUM SERPL-SCNC: 4.6 MMOL/L (ref 3.6–5.5)
PROT SERPL-MCNC: 6.5 G/DL (ref 6–8.2)
RBC # BLD AUTO: 2.56 M/UL (ref 4.2–5.4)
SODIUM SERPL-SCNC: 125 MMOL/L (ref 135–145)
TROPONIN T SERPL-MCNC: 47 NG/L (ref 6–19)
WBC # BLD AUTO: 11.8 K/UL (ref 4.8–10.8)

## 2021-10-23 PROCEDURE — 93005 ELECTROCARDIOGRAM TRACING: CPT | Performed by: EMERGENCY MEDICINE

## 2021-10-23 PROCEDURE — 96375 TX/PRO/DX INJ NEW DRUG ADDON: CPT

## 2021-10-23 PROCEDURE — 85025 COMPLETE CBC W/AUTO DIFF WBC: CPT

## 2021-10-23 PROCEDURE — 80053 COMPREHEN METABOLIC PANEL: CPT

## 2021-10-23 PROCEDURE — 84484 ASSAY OF TROPONIN QUANT: CPT

## 2021-10-23 PROCEDURE — 700101 HCHG RX REV CODE 250: Performed by: EMERGENCY MEDICINE

## 2021-10-23 PROCEDURE — 82962 GLUCOSE BLOOD TEST: CPT

## 2021-10-23 PROCEDURE — 700111 HCHG RX REV CODE 636 W/ 250 OVERRIDE (IP): Performed by: EMERGENCY MEDICINE

## 2021-10-23 PROCEDURE — 99284 EMERGENCY DEPT VISIT MOD MDM: CPT

## 2021-10-23 PROCEDURE — 94760 N-INVAS EAR/PLS OXIMETRY 1: CPT

## 2021-10-23 PROCEDURE — 83690 ASSAY OF LIPASE: CPT

## 2021-10-23 PROCEDURE — 99285 EMERGENCY DEPT VISIT HI MDM: CPT | Mod: 27

## 2021-10-23 PROCEDURE — 74177 CT ABD & PELVIS W/CONTRAST: CPT | Mod: ME

## 2021-10-23 PROCEDURE — 96374 THER/PROPH/DIAG INJ IV PUSH: CPT | Mod: XU

## 2021-10-23 PROCEDURE — 96374 THER/PROPH/DIAG INJ IV PUSH: CPT

## 2021-10-23 PROCEDURE — 700117 HCHG RX CONTRAST REV CODE 255: Performed by: EMERGENCY MEDICINE

## 2021-10-23 RX ORDER — ONDANSETRON 2 MG/ML
4 INJECTION INTRAMUSCULAR; INTRAVENOUS ONCE
Status: COMPLETED | OUTPATIENT
Start: 2021-10-23 | End: 2021-10-23

## 2021-10-23 RX ORDER — HYDROMORPHONE HYDROCHLORIDE 1 MG/ML
1 INJECTION, SOLUTION INTRAMUSCULAR; INTRAVENOUS; SUBCUTANEOUS ONCE
Status: COMPLETED | OUTPATIENT
Start: 2021-10-23 | End: 2021-10-23

## 2021-10-23 RX ADMIN — HYDROMORPHONE HYDROCHLORIDE 1 MG: 1 INJECTION, SOLUTION INTRAMUSCULAR; INTRAVENOUS; SUBCUTANEOUS at 03:56

## 2021-10-23 RX ADMIN — KETAMINE HYDROCHLORIDE 25 MG: 10 INJECTION, SOLUTION INTRAMUSCULAR; INTRAVENOUS at 05:05

## 2021-10-23 RX ADMIN — ONDANSETRON 4 MG: 2 INJECTION INTRAMUSCULAR; INTRAVENOUS at 20:23

## 2021-10-23 RX ADMIN — IOHEXOL 100 ML: 350 INJECTION, SOLUTION INTRAVENOUS at 20:25

## 2021-10-23 RX ADMIN — KETAMINE HYDROCHLORIDE 25 MG: 10 INJECTION, SOLUTION INTRAMUSCULAR; INTRAVENOUS at 20:26

## 2021-10-23 ASSESSMENT — FIBROSIS 4 INDEX
FIB4 SCORE: 1.87
FIB4 SCORE: 1.87

## 2021-10-23 ASSESSMENT — ENCOUNTER SYMPTOMS
FEVER: 0
SHORTNESS OF BREATH: 0
CHILLS: 0

## 2021-10-23 ASSESSMENT — LIFESTYLE VARIABLES
EVER FELT BAD OR GUILTY ABOUT YOUR DRINKING: NO
HAVE PEOPLE ANNOYED YOU BY CRITICIZING YOUR DRINKING: NO
TOTAL SCORE: 0
AVERAGE NUMBER OF DAYS PER WEEK YOU HAVE A DRINK CONTAINING ALCOHOL: 0
TOTAL SCORE: 0
ON A TYPICAL DAY WHEN YOU DRINK ALCOHOL HOW MANY DRINKS DO YOU HAVE: 0
DO YOU DRINK ALCOHOL: NO
TOTAL SCORE: 0
CONSUMPTION TOTAL: NEGATIVE
HOW MANY TIMES IN THE PAST YEAR HAVE YOU HAD 5 OR MORE DRINKS IN A DAY: 0
EVER HAD A DRINK FIRST THING IN THE MORNING TO STEADY YOUR NERVES TO GET RID OF A HANGOVER: NO
HAVE YOU EVER FELT YOU SHOULD CUT DOWN ON YOUR DRINKING: NO

## 2021-10-23 NOTE — ED NOTES
Pt to be discahrged home, ambulatory, A&Ox4. Pt given discharge and follow up instructions, verbalizes understanding

## 2021-10-23 NOTE — ED PROVIDER NOTES
ED Provider Note     10/23/2021  3:34 AM    Means of Arrival: Walk In  History obtained by: patient  Limitations:none  PCP: Jarrell Yates  CODE STATUS: Full    CHIEF COMPLAINT  Chief Complaint   Patient presents with   • Abdominal Pain     Per EMS, pt was here and discharged earlier today. States pain in 10x worse than before. IV initated, 4 mg of morphine and 4 mg zofran given in route   • Jaundice     Pt states she has been seen for liver issues       HPI  Anu Manuel is a 66 y.o. female with type 1 diabetes, hypertension, coronary artery disease with stent at [facility] 8/9/2020, hypothyroidism who presents with concerns of abdominal discomfort.  She has had an extensive work-up for jaundice starting this summer.  She has had multiple hospitalizations since August.  Initial presentation was for jaundice.  She has had MRI of the abdomen, MRCP there is no findings of mass, obstruction or biliary duct abnormality.  She had a Doppler of her liver which showed normal blood flow throughout the liver.  Hepatitis panel negative.  CT the abdomen that was performed showed thickened colon wall throughout the entire colon interpreted as possible inflammatory bowel disease.  There is notes that she had a colonoscopy 5/18/2018 that showed abnormal vascularity congestion nodularity and ulceration in the sigmoid colon.  September 27 she was hospitalized at [facility]  She was hospitalized there and part of the work-up was to receive a liver biopsy.  She ended up leaving AGAINST MEDICAL ADVICE.  Initial biopsy was inconclusive and sent to [place] for further analysis.  Subsequent ER visits.  Was hospitalized at [facility] on 10/13/2021.  H&P from that visit mentions [place] interpretation of liver biopsy showed features of large bile duct obstruction mild macrovesicular steatosis.  She was started on your sodium 300 mg 3 times daily.  Follow-up appointment on 10/19/2021 with primary mentions hepatology appointment in 3 weeks.  She  then came to the emergency department on 10/21 and was hospitalized at our facility for pain management.  Pain is better controlled.  Multiple consultants input given.  General surgery consulted for possible cholecystectomy and there were no recommendations for cholecystectomy.  She was started on fentanyl patch.  She was also to continue oxycodone prescribed from [facility].  She has follows with Dr. Zavala gastroenterology and Dr. Jarrell Yates primary care physician who will undertake pain management.  Multiple recordings of hypoglycemia during hospital stay.  Ursodiol decreased to daily dosing. US done yesterday showed a small amount of ascites.     She called the ambulance this evening because she is having continued pain in her abdomen.  She describes the pain as tightening bands across the abdomen. No improvement even with prescribed narcotics.       REVIEW OF SYSTEMS  Review of Systems   Constitutional: Negative for chills and fever.   Respiratory: Negative for shortness of breath.    Cardiovascular: Negative for chest pain.   All other systems reviewed and are negative.    See HPI for further details.    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    FAMILY HISTORY  Family History   Problem Relation Age of Onset   • Hypertension Mother    • Cancer Father        SOCIAL HISTORY  Social History     Tobacco Use   • Smoking status: Current Every Day Smoker      "Packs/day: 0.50     Years: 30.00     Pack years: 15.00     Types: Cigarettes   • Smokeless tobacco: Never Used   Vaping Use   • Vaping Use: Never used   Substance and Sexual Activity   • Alcohol use: Yes   • Drug use: Yes     Comment: Marijuana   • Sexual activity: Not on file       SURGICAL HISTORY   has a past surgical history that includes hysterectomy, vaginal (2006); thyroid lobectomy (1973); lumpectomy (1976, 2005); cervical disk and fusion anterior (03/12/08); tonsillectomy (1963); cervical fusion posterior (1/16/2009); hardware removal neuro (1/16/2009); neck exploration (1/16/2009); abdominal hysterectomy total; lumpectomy; lumbar laminectomy diskectomy (Right, 5/10/2016); shoulder decompression arthroscopic (6/17/08); clavicle distal excision (6/17/08); shoulder arthroscopy w/ rotator cuff repair (10/9/08); njx aa&/strd tfrml epi lumbar/sacral 1 level (Right, 8/31/2016); spinal cord stimulator (N/A, 10/26/2018); thoracic laminectomy (N/A, 10/26/2018); appendectomy (2004); implant neurostim/ (N/A, 12/16/2019); irrigation & debridement neuro (1/19/2020); and cath placement (1/25/2020).    CURRENT MEDICATIONS  Home Medications    **Home medications have not yet been reviewed for this encounter**         ALLERGIES  Allergies   Allergen Reactions   • Tape Unspecified     Blisters, paper tape is ok       PHYSICAL EXAM  VITAL SIGNS: /78   Pulse 68   Temp 36.2 °C (97.2 °F) (Temporal)   Resp 18   Ht 1.676 m (5' 6\")   Wt 71.3 kg (157 lb 3 oz)   LMP 04/29/2005 (LMP Unknown)   SpO2 100%   BMI 25.37 kg/m²     Pulse ox interpretation: I interpret this pulse ox as normal.  Constitutional: Jaundiced 66-year-old woman appears uncomfortable.  HENT: No signs of trauma, Bilateral external ears normal, Nose normal.   Eyes: Pupils are equal, Conjunctiva normal, scleral icterus  Neck: Normal range of motion, No tenderness, Supple, No stridor.   Lymphatic: No lymphadenopathy noted.   Cardiovascular: Regular " rate and rhythm, no murmurs. Symmetric distal pulses. No cyanosis of extremities. No peripheral edema of extremities.  Thorax & Lungs: Normal breath sounds, No respiratory distress, No wheezing, No chest tenderness.   Abdomen: Mild distention, no fluid wave  Skin: Warm, Dry, No erythema, No rash.  Jaundice  Back: No midline bony tenderness, No CVA tenderness.   Musculoskeletal: Good range of motion in all major joints. No tenderness to palpation or major deformities noted.   Neurologic: Alert , Normal motor function, Normal sensory function, No focal deficits noted.   Psychiatric: Affect normal, Judgment normal, Mood normal.   Physical Exam      DIAGNOSTIC STUDIES / PROCEDURES      LABS  Pertinent Labs & Imaging studies reviewed. (See chart for details)    RADIOLOGY  Pertinent Labs & Imaging studies reviewed. (See chart for details)    COURSE & MEDICAL DECISION MAKING  Pertinent Labs & Imaging studies reviewed. (See chart for details)    3:34 AM This is an emergent evaluation of a  66 y.o. female with diabetes, signs of liver failure progressing over past few months now here with concerns of recurrent abdominal pain. Abdomen with mild distension but no peritoneal signs or fluid wave. I have reviewed her notes extensively. I have low suspicion for an acute change this evening. Plan to focus on pain control and she is agreeable to this.     5:05 AM  Minimal improvement with initial pain interventions. Will try ketamine. I have referenced [employer] and spoken with our clinical pharmacist, Rose, regarding dosing in setting of hepatic disease. She will receive infusion of ketamine 25mg IV. She has had this before and she says it was very helpful.     5:45 AM  Dramatic improvement in pain. Feeling much better. Unfortunate situation for her. She does not yet have a diagnosis for her liver disease. She would like to speak with someone about advanced care planning but would also like a prognosis before doing so. I have  recommended she speak about this to her primary provider. I have also spoken with our social work team who will see if palliative care can reach out to her.     I believe she can be safely discharged now. She has upcoming appointment with Dr. Zavala and Jarrell.      The patient will return for worsening symptoms and is stable at the time of discharge. The patient verbalizes understanding. Guidance was provided on appropriate use of medications including driving under the influence, overdose, and side effects.         FINAL IMPRESSION    ICD-10-CM   1. Abdominal pain, unspecified abdominal location  R10.9   2. Jaundice Chronic R17            This dictation was created using voice recognition software. The accuracy of the dictation is limited to the abilities of the software. I expect there may be some errors of grammar and possibly content. The nursing notes were reviewed and certain aspects of this information were incorporated into this note.    Electronically signed by: Davin Tobar II, M.D., 10/23/2021 3:34 AM

## 2021-10-23 NOTE — ED NOTES
Chief Complaint   Patient presents with   • Abdominal Pain     Per EMS, pt was here and discharged earlier today. States pain in 10x worse than before. IV initated, 4 mg of morphine and 4 mg zofran given in route   • Jaundice     Pt states she has been seen for liver issues

## 2021-10-24 ENCOUNTER — HOSPITAL ENCOUNTER (INPATIENT)
Facility: MEDICAL CENTER | Age: 66
LOS: 9 days | DRG: 418 | End: 2021-11-03
Attending: EMERGENCY MEDICINE | Admitting: STUDENT IN AN ORGANIZED HEALTH CARE EDUCATION/TRAINING PROGRAM
Payer: MEDICARE

## 2021-10-24 DIAGNOSIS — R60.1 ANASARCA: ICD-10-CM

## 2021-10-24 DIAGNOSIS — R79.89 ELEVATED LFTS: ICD-10-CM

## 2021-10-24 DIAGNOSIS — R17 ELEVATED BILIRUBIN: ICD-10-CM

## 2021-10-24 DIAGNOSIS — K83.1 BILIARY OBSTRUCTION: ICD-10-CM

## 2021-10-24 LAB
ALBUMIN SERPL BCP-MCNC: 2.9 G/DL (ref 3.2–4.9)
ALBUMIN/GLOB SERPL: 0.8 G/DL
ALP SERPL-CCNC: 1948 U/L (ref 30–99)
ALT SERPL-CCNC: 110 U/L (ref 2–50)
ANION GAP SERPL CALC-SCNC: 13 MMOL/L (ref 7–16)
AST SERPL-CCNC: 180 U/L (ref 12–45)
BASOPHILS # BLD AUTO: 0.6 % (ref 0–1.8)
BASOPHILS # BLD: 0.07 K/UL (ref 0–0.12)
BILIRUB SERPL-MCNC: 9.9 MG/DL (ref 0.1–1.5)
BUN SERPL-MCNC: 13 MG/DL (ref 8–22)
CALCIUM SERPL-MCNC: 8.8 MG/DL (ref 8.5–10.5)
CHLORIDE SERPL-SCNC: 93 MMOL/L (ref 96–112)
CO2 SERPL-SCNC: 22 MMOL/L (ref 20–33)
CREAT SERPL-MCNC: 0.81 MG/DL (ref 0.5–1.4)
EOSINOPHIL # BLD AUTO: 0.3 K/UL (ref 0–0.51)
EOSINOPHIL NFR BLD: 2.6 % (ref 0–6.9)
ERYTHROCYTE [DISTWIDTH] IN BLOOD BY AUTOMATED COUNT: 64.6 FL (ref 35.9–50)
GLOBULIN SER CALC-MCNC: 3.6 G/DL (ref 1.9–3.5)
GLUCOSE SERPL-MCNC: 289 MG/DL (ref 65–99)
HCT VFR BLD AUTO: 29.8 % (ref 37–47)
HGB BLD-MCNC: 9.4 G/DL (ref 12–16)
IMM GRANULOCYTES # BLD AUTO: 0.06 K/UL (ref 0–0.11)
IMM GRANULOCYTES NFR BLD AUTO: 0.5 % (ref 0–0.9)
LIPASE SERPL-CCNC: 12 U/L (ref 11–82)
LYMPHOCYTES # BLD AUTO: 0.93 K/UL (ref 1–4.8)
LYMPHOCYTES NFR BLD: 8 % (ref 22–41)
MCH RBC QN AUTO: 33.3 PG (ref 27–33)
MCHC RBC AUTO-ENTMCNC: 31.5 G/DL (ref 33.6–35)
MCV RBC AUTO: 105.7 FL (ref 81.4–97.8)
MONOCYTES # BLD AUTO: 0.97 K/UL (ref 0–0.85)
MONOCYTES NFR BLD AUTO: 8.4 % (ref 0–13.4)
NEUTROPHILS # BLD AUTO: 9.25 K/UL (ref 2–7.15)
NEUTROPHILS NFR BLD: 79.9 % (ref 44–72)
NRBC # BLD AUTO: 0 K/UL
NRBC BLD-RTO: 0 /100 WBC
PLATELET # BLD AUTO: 387 K/UL (ref 164–446)
PMV BLD AUTO: 11.5 FL (ref 9–12.9)
POTASSIUM SERPL-SCNC: 3.8 MMOL/L (ref 3.6–5.5)
PROT SERPL-MCNC: 6.5 G/DL (ref 6–8.2)
RBC # BLD AUTO: 2.82 M/UL (ref 4.2–5.4)
SODIUM SERPL-SCNC: 128 MMOL/L (ref 135–145)
WBC # BLD AUTO: 11.6 K/UL (ref 4.8–10.8)

## 2021-10-24 PROCEDURE — 80053 COMPREHEN METABOLIC PANEL: CPT

## 2021-10-24 PROCEDURE — 96375 TX/PRO/DX INJ NEW DRUG ADDON: CPT

## 2021-10-24 PROCEDURE — 83690 ASSAY OF LIPASE: CPT

## 2021-10-24 PROCEDURE — 85025 COMPLETE CBC W/AUTO DIFF WBC: CPT

## 2021-10-24 PROCEDURE — 700101 HCHG RX REV CODE 250: Performed by: EMERGENCY MEDICINE

## 2021-10-24 PROCEDURE — 96374 THER/PROPH/DIAG INJ IV PUSH: CPT

## 2021-10-24 PROCEDURE — 81001 URINALYSIS AUTO W/SCOPE: CPT

## 2021-10-24 PROCEDURE — 700105 HCHG RX REV CODE 258: Performed by: EMERGENCY MEDICINE

## 2021-10-24 PROCEDURE — 99285 EMERGENCY DEPT VISIT HI MDM: CPT

## 2021-10-24 RX ADMIN — KETAMINE HYDROCHLORIDE 25 MG: 10 INJECTION, SOLUTION INTRAMUSCULAR; INTRAVENOUS at 22:53

## 2021-10-24 ASSESSMENT — FIBROSIS 4 INDEX: FIB4 SCORE: 2.75

## 2021-10-24 NOTE — ED PROVIDER NOTES
ED Provider Note    Scribed for Santiago Duffy M.D. by Konrad Jeffries. 10/23/2021  7:37 PM    Primary care provider: Jarrell Yates M.D.  Means of arrival: EMS  History obtained from: Patient  History limited by: None    CHIEF COMPLAINT  Chief Complaint   Patient presents with    Abdominal Pain     reports epigastric pain that starts every night at bedtime.        HPI  Anu Manuel is a 66 y.o. female who presents to the Emergency Department for evaluation of abdominal pain onset every night. 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 She had a liver biopsy done at [facility]. She states she experiences pain every evening/night. Patient has been monitoring her diet carefully to rule out contributors of her abdominal pain. Patient did not take oxycodone tonight and last took it yesterday. She admits to associated symptoms of abdominal pain, nausea vomiting, but denies fevers. No alleviating factors were reported.       REVIEW OF SYSTEMS   All other systems reviewed and negative.    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    SURGICAL HISTORY   has a past surgical history that includes hysterectomy, vaginal (2006); thyroid lobectomy (1973); lumpectomy (1976, 2005); cervical disk and fusion anterior (03/12/08); tonsillectomy (1963); cervical fusion posterior (1/16/2009); hardware removal neuro (1/16/2009); neck exploration  (1/16/2009); abdominal hysterectomy total; lumpectomy; lumbar laminectomy diskectomy (Right, 5/10/2016); shoulder decompression arthroscopic (6/17/08); clavicle distal excision (6/17/08); shoulder arthroscopy w/ rotator cuff repair (10/9/08); njx aa&/strd tfrml epi lumbar/sacral 1 level (Right, 8/31/2016); spinal cord stimulator (N/A, 10/26/2018); thoracic laminectomy (N/A, 10/26/2018); appendectomy (2004); implant neurostim/ (N/A, 12/16/2019); irrigation & debridement neuro (1/19/2020); and cath placement (1/25/2020).    SOCIAL HISTORY  Social History     Tobacco Use    Smoking status: Current Every Day Smoker     Packs/day: 0.50     Years: 30.00     Pack years: 15.00     Types: Cigarettes    Smokeless tobacco: Never Used   Vaping Use    Vaping Use: Never used   Substance Use Topics    Alcohol use: Not Currently    Drug use: Yes     Comment: Marijuana      Social History     Substance and Sexual Activity   Drug Use Yes    Comment: Marijuana       FAMILY HISTORY  Family History   Problem Relation Age of Onset    Hypertension Mother     Cancer Father        CURRENT MEDICATIONS  Home Medications       Reviewed by Monica Vides R.N. (Registered Nurse) on 10/23/21 at 1924  Med List Status: Not Addressed     Medication Last Dose Status   ALPRAZolam (XANAX) 0.5 MG Tab  Active   amLODIPine (NORVASC) 10 MG Tab  Active   Ascorbic Acid (VITAMIN C) 250 MG Tab  Active   aspirin 81 MG EC tablet  Active   B Complex Vitamins (VITAMIN B COMPLEX PO)  Active   Continuous Blood Gluc Sensor (FREESTYLE PARISA 14 DAY SENSOR) Misc  Active   diphenhydrAMINE (BENADRYL ALLERGY) 25 MG capsule  Active   Docusate Calcium (STOOL SOFTENER PO)  Active   ezetimibe (ZETIA) 10 MG Tab  Active   famotidine (PEPCID) 20 MG Tab  Active   fentaNYL (DURAGESIC) 25 MCG/HR PATCH 72 HR  Active   insulin aspart (NOVOLOG FLEXPEN) 100 UNIT/ML injection PEN  Active   levothyroxine (SYNTHROID) 25 MCG Tab  Active   metoprolol (TOPROL-XL) 200 MG XL tablet   "Active   ondansetron (ZOFRAN ODT) 4 MG TABLET DISPERSIBLE  Active   oxycodone 5 MG capsule  Active   pantoprazole (PROTONIX) 40 MG Tablet Delayed Response  Active   pyridoxine (VITAMIN B-6) 50 MG Tab  Active   sertraline (ZOLOFT) 100 MG Tab  Active   SYNTHROID 200 MCG Tab  Active   traZODone (DESYREL) 100 MG Tab  Active   TRESIBA FLEXTOUCH 100 UNIT/ML Solution Pen-injector  Active   ursodiol (ACTIGALL) 300 MG Cap  Active   vitamin D3 (QC VITAMIN D3) 5000 Unit (125 mcg) Tab  Active                    ALLERGIES  Allergies   Allergen Reactions    Tape Unspecified     Blisters, paper tape is ok       PHYSICAL EXAM  VITAL SIGNS: BP (!) 172/82   Pulse 60   Temp 36.7 °C (98.1 °F) (Temporal)   Resp 18   Ht 1.676 m (5' 6\")   Wt 71.3 kg (157 lb 3 oz)   LMP 04/29/2005 (LMP Unknown)   SpO2 98%   BMI 25.37 kg/m²     Constitutional: Well developed, Well nourished, moderate distress, Non-toxic appearance.   HENT: Normocephalic, Atraumatic, Bilateral external ears normal, Oropharynx moist, No oral exudates.   Eyes: Scleral icterus, PERRLA, EOMI, Conjunctiva normal, No discharge.   Neck: No tenderness, Supple, No stridor.   Lymphatic: No lymphadenopathy noted.   Cardiovascular: Normal heart rate, Normal rhythm.   Thorax & Lungs: Clear to auscultation bilaterally, No respiratory distress, No wheezing, No crackles.   Abdomen: mild epigastric abdominal tenderness, Soft, No masses, No pulsatile masses.   Skin: Jaundice, Warm, Dry, No erythema, No rash.   Extremities:, No edema No cyanosis.   Musculoskeletal: No tenderness to palpation or major deformities noted.  Intact distal pulses  Neurologic: Awake, alert. Moves all extremities spontaneously.  Psychiatric: Affect normal, Judgment normal, Mood normal.     LABS  Results for orders placed or performed during the hospital encounter of 10/23/21   CBC WITH DIFFERENTIAL   Result Value Ref Range    WBC 11.8 (H) 4.8 - 10.8 K/uL    RBC 2.56 (L) 4.20 - 5.40 M/uL    Hemoglobin 8.4 (L) " 12.0 - 16.0 g/dL    Hematocrit 27.2 (L) 37.0 - 47.0 %    .3 (H) 81.4 - 97.8 fL    MCH 32.8 27.0 - 33.0 pg    MCHC 30.9 (L) 33.6 - 35.0 g/dL    RDW 63.7 (H) 35.9 - 50.0 fL    Platelet Count 357 164 - 446 K/uL    MPV 11.6 9.0 - 12.9 fL    Neutrophils-Polys 76.80 (H) 44.00 - 72.00 %    Lymphocytes 8.70 (L) 22.00 - 41.00 %    Monocytes 9.00 0.00 - 13.40 %    Eosinophils 4.10 0.00 - 6.90 %    Basophils 0.90 0.00 - 1.80 %    Immature Granulocytes 0.50 0.00 - 0.90 %    Nucleated RBC 0.20 /100 WBC    Neutrophils (Absolute) 9.04 (H) 2.00 - 7.15 K/uL    Lymphs (Absolute) 1.02 1.00 - 4.80 K/uL    Monos (Absolute) 1.06 (H) 0.00 - 0.85 K/uL    Eos (Absolute) 0.48 0.00 - 0.51 K/uL    Baso (Absolute) 0.11 0.00 - 0.12 K/uL    Immature Granulocytes (abs) 0.06 0.00 - 0.11 K/uL    NRBC (Absolute) 0.02 K/uL   COMP METABOLIC PANEL   Result Value Ref Range    Sodium 125 (L) 135 - 145 mmol/L    Potassium 4.6 3.6 - 5.5 mmol/L    Chloride 93 (L) 96 - 112 mmol/L    Co2 23 20 - 33 mmol/L    Anion Gap 9.0 7.0 - 16.0    Glucose 263 (H) 65 - 99 mg/dL    Bun 14 8 - 22 mg/dL    Creatinine 0.57 0.50 - 1.40 mg/dL    Calcium 8.4 8.4 - 10.2 mg/dL    AST(SGOT) 148 (H) 12 - 45 U/L    ALT(SGPT) 93 (H) 2 - 50 U/L    Alkaline Phosphatase 1968 (H) 30 - 99 U/L    Total Bilirubin 8.8 (H) 0.1 - 1.5 mg/dL    Albumin 2.4 (L) 3.2 - 4.9 g/dL    Total Protein 6.5 6.0 - 8.2 g/dL    Globulin 4.1 (H) 1.9 - 3.5 g/dL    A-G Ratio 0.6 g/dL   LIPASE   Result Value Ref Range    Lipase 11 7 - 58 U/L   TROPONIN   Result Value Ref Range    Troponin T 47 (H) 6 - 19 ng/L   ESTIMATED GFR   Result Value Ref Range    GFR If African American >60 >60 mL/min/1.73 m 2    GFR If Non African American >60 >60 mL/min/1.73 m 2   EKG (NOW)   Result Value Ref Range    Report       [facility] Emergency Dept.    Test Date:  2021-10-23  Pt Name:    KALPANA BENJAMÍN               Department: [facility]  MRN:        [id number]                      Room:       [facility]  10  Gender:     Female                       Technician: CJ  :        [id number]                   Requested By:RHEA CROOK  Order #:    [id number]                    Reading MD: RHEA CROOK MD    Measurements  Intervals                                Axis  Rate:       58                           P:          67  WV:         135                          QRS:        69  QRSD:       87                           T:          50  QT:         410  QTc:        403    Interpretive Statements  Sinus rhythm  Low voltage, precordial leads  Compared to ECG 2021 20:13:25  Low QRS voltage now present  Electronically Signed On 10- 20:52:08 PDT by RHEA CROOK MD          RADIOLOGY  CT-ABDOMEN-PELVIS WITH   Final Result      1.  Diffuse subcutaneous edema/anasarca.   2.  New trace right pleural effusion.   3.  Probable constipation.   4.  Atherosclerosis.        The radiologist's interpretation of all radiological studies have been reviewed by me.      COURSE & MEDICAL DECISION MAKING  Pertinent Labs & Imaging studies reviewed. (See chart for details)    I reviewed the patient's medical records which showed that the patient has had multiple hospital visits. She was seen this morning at 3 AM and was discharged yesterday for pain control. She was given ketamine this morning and was instructed to follow up. She had a CT done two weeks ago. She had an ultrasound done yesterday which was negative.    7:37 PM - Patient seen and examined at bedside. Patient will be treated with ketamine 25 mg in NS 50 mL IV and zofran 4 mg injection. Ordered CT-Abdomen-pelvis, CBC with diff, CMP, lipase, urinalysis, troponin, and EKG to evaluate her symptoms. The differential diagnoses include but are not limited to: acute chronic pain, liver failure, gallbladder      Decision Making:  Patient has a longstanding history of epigastric dental pain, seems to be getting worse, due to the patient's worsening symptoms as  again did do a CT scan that does not show any acute new abnormalities.  The patient is instructed she needs to follow-up with pain management doctor, I have given the patient ketamine which seemed to help the patient's symptoms.  The patient will follow up with a primary care physician and GI doctor, have the patient return with worsening symptoms.        FINAL IMPRESSION  1. Epigastric pain    2. Acute liver failure without hepatic coma    3. Jaundice          Konrad MCKEON (Susan), am scribing for, and in the presence of, Santiago Duffy M.D..    Electronically signed by: Konrad Jeffries (Susan), 10/23/2021    ISantiago M.D. personally performed the services described in this documentation, as scribed by Konrad Jeffries in my presence, and it is both accurate and complete. C    The note accurately reflects work and decisions made by me.  Santiago Duffy M.D.  10/23/2021  10:29 PM

## 2021-10-25 ENCOUNTER — APPOINTMENT (OUTPATIENT)
Dept: RADIOLOGY | Facility: MEDICAL CENTER | Age: 66
DRG: 418 | End: 2021-10-25
Attending: EMERGENCY MEDICINE
Payer: MEDICARE

## 2021-10-25 PROBLEM — D64.9 ANEMIA: Status: ACTIVE | Noted: 2021-09-23

## 2021-10-25 PROBLEM — K81.9 CHOLECYSTITIS: Status: ACTIVE | Noted: 2021-10-25

## 2021-10-25 LAB
APPEARANCE UR: CLEAR
BACTERIA #/AREA URNS HPF: NEGATIVE /HPF
BILIRUB CONJ SERPL-MCNC: 5.8 MG/DL (ref 0.1–0.5)
BILIRUB UR QL STRIP.AUTO: ABNORMAL
COLOR UR: ABNORMAL
EPI CELLS #/AREA URNS HPF: ABNORMAL /HPF
GLUCOSE BLD-MCNC: 205 MG/DL (ref 65–99)
GLUCOSE BLD-MCNC: 354 MG/DL (ref 65–99)
GLUCOSE UR STRIP.AUTO-MCNC: 250 MG/DL
KETONES UR STRIP.AUTO-MCNC: NEGATIVE MG/DL
LDH SERPL L TO P-CCNC: 284 U/L (ref 107–266)
LEUKOCYTE ESTERASE UR QL STRIP.AUTO: ABNORMAL
MAGNESIUM SERPL-MCNC: 2.2 MG/DL (ref 1.5–2.5)
MICRO URNS: ABNORMAL
NITRITE UR QL STRIP.AUTO: NEGATIVE
OSMOLALITY SERPL: 300 MOSM/KG H2O (ref 278–298)
OSMOLALITY UR: 628 MOSM/KG H2O (ref 300–900)
PH UR STRIP.AUTO: 5.5 [PH] (ref 5–8)
PHOSPHATE SERPL-MCNC: 3.5 MG/DL (ref 2.5–4.5)
PROT UR QL STRIP: 300 MG/DL
RBC # URNS HPF: ABNORMAL /HPF
RBC UR QL AUTO: ABNORMAL
SARS-COV+SARS-COV-2 AG RESP QL IA.RAPID: NOTDETECTED
SODIUM UR-SCNC: 77 MMOL/L
SP GR UR STRIP.AUTO: 1.04
SPECIMEN SOURCE: NORMAL
UROBILINOGEN UR STRIP.AUTO-MCNC: 1 MG/DL
WBC #/AREA URNS HPF: ABNORMAL /HPF

## 2021-10-25 PROCEDURE — 99223 1ST HOSP IP/OBS HIGH 75: CPT | Mod: AI | Performed by: STUDENT IN AN ORGANIZED HEALTH CARE EDUCATION/TRAINING PROGRAM

## 2021-10-25 PROCEDURE — 84300 ASSAY OF URINE SODIUM: CPT

## 2021-10-25 PROCEDURE — 83930 ASSAY OF BLOOD OSMOLALITY: CPT

## 2021-10-25 PROCEDURE — 84100 ASSAY OF PHOSPHORUS: CPT

## 2021-10-25 PROCEDURE — 770006 HCHG ROOM/CARE - MED/SURG/GYN SEMI*

## 2021-10-25 PROCEDURE — 83735 ASSAY OF MAGNESIUM: CPT

## 2021-10-25 PROCEDURE — 700102 HCHG RX REV CODE 250 W/ 637 OVERRIDE(OP): Performed by: STUDENT IN AN ORGANIZED HEALTH CARE EDUCATION/TRAINING PROGRAM

## 2021-10-25 PROCEDURE — 82962 GLUCOSE BLOOD TEST: CPT

## 2021-10-25 PROCEDURE — 700111 HCHG RX REV CODE 636 W/ 250 OVERRIDE (IP): Performed by: EMERGENCY MEDICINE

## 2021-10-25 PROCEDURE — 87426 SARSCOV CORONAVIRUS AG IA: CPT

## 2021-10-25 PROCEDURE — 83935 ASSAY OF URINE OSMOLALITY: CPT

## 2021-10-25 PROCEDURE — 87040 BLOOD CULTURE FOR BACTERIA: CPT

## 2021-10-25 PROCEDURE — 700105 HCHG RX REV CODE 258: Performed by: STUDENT IN AN ORGANIZED HEALTH CARE EDUCATION/TRAINING PROGRAM

## 2021-10-25 PROCEDURE — 36415 COLL VENOUS BLD VENIPUNCTURE: CPT

## 2021-10-25 PROCEDURE — 76705 ECHO EXAM OF ABDOMEN: CPT

## 2021-10-25 PROCEDURE — 83615 LACTATE (LD) (LDH) ENZYME: CPT

## 2021-10-25 PROCEDURE — 99223 1ST HOSP IP/OBS HIGH 75: CPT | Performed by: SURGERY

## 2021-10-25 PROCEDURE — A9270 NON-COVERED ITEM OR SERVICE: HCPCS | Performed by: STUDENT IN AN ORGANIZED HEALTH CARE EDUCATION/TRAINING PROGRAM

## 2021-10-25 PROCEDURE — 83010 ASSAY OF HAPTOGLOBIN QUANT: CPT

## 2021-10-25 PROCEDURE — 82248 BILIRUBIN DIRECT: CPT

## 2021-10-25 PROCEDURE — 700111 HCHG RX REV CODE 636 W/ 250 OVERRIDE (IP): Performed by: STUDENT IN AN ORGANIZED HEALTH CARE EDUCATION/TRAINING PROGRAM

## 2021-10-25 RX ORDER — URSODIOL 300 MG/1
300 CAPSULE ORAL DAILY
Status: DISCONTINUED | OUTPATIENT
Start: 2021-10-25 | End: 2021-11-03 | Stop reason: HOSPADM

## 2021-10-25 RX ORDER — DEXTROSE MONOHYDRATE 25 G/50ML
50 INJECTION, SOLUTION INTRAVENOUS
Status: DISCONTINUED | OUTPATIENT
Start: 2021-10-25 | End: 2021-10-27

## 2021-10-25 RX ORDER — SERTRALINE HYDROCHLORIDE 100 MG/1
100 TABLET, FILM COATED ORAL EVERY EVENING
Status: DISCONTINUED | OUTPATIENT
Start: 2021-10-25 | End: 2021-11-03 | Stop reason: HOSPADM

## 2021-10-25 RX ORDER — CAPSAICIN 0.025 %
CREAM (GRAM) TOPICAL ONCE
Status: DISPENSED | OUTPATIENT
Start: 2021-10-25 | End: 2021-10-26

## 2021-10-25 RX ORDER — AMLODIPINE BESYLATE 10 MG/1
5 TABLET ORAL EVERY EVENING
Status: DISCONTINUED | OUTPATIENT
Start: 2021-10-25 | End: 2021-11-01

## 2021-10-25 RX ORDER — AMOXICILLIN 250 MG
2 CAPSULE ORAL 2 TIMES DAILY
Status: DISCONTINUED | OUTPATIENT
Start: 2021-10-25 | End: 2021-11-03 | Stop reason: HOSPADM

## 2021-10-25 RX ORDER — ALPRAZOLAM 0.5 MG/1
0.5 TABLET ORAL
Status: COMPLETED | OUTPATIENT
Start: 2021-10-25 | End: 2021-10-25

## 2021-10-25 RX ORDER — ONDANSETRON 4 MG/1
4 TABLET, ORALLY DISINTEGRATING ORAL EVERY 4 HOURS PRN
Status: DISCONTINUED | OUTPATIENT
Start: 2021-10-25 | End: 2021-11-03 | Stop reason: HOSPADM

## 2021-10-25 RX ORDER — POLYETHYLENE GLYCOL 3350 17 G/17G
1 POWDER, FOR SOLUTION ORAL
Status: DISCONTINUED | OUTPATIENT
Start: 2021-10-25 | End: 2021-11-03 | Stop reason: HOSPADM

## 2021-10-25 RX ORDER — SODIUM CHLORIDE, SODIUM LACTATE, POTASSIUM CHLORIDE, CALCIUM CHLORIDE 600; 310; 30; 20 MG/100ML; MG/100ML; MG/100ML; MG/100ML
INJECTION, SOLUTION INTRAVENOUS CONTINUOUS
Status: ACTIVE | OUTPATIENT
Start: 2021-10-25 | End: 2021-10-25

## 2021-10-25 RX ORDER — HALOPERIDOL 5 MG/ML
2 INJECTION INTRAMUSCULAR ONCE
Status: COMPLETED | OUTPATIENT
Start: 2021-10-25 | End: 2021-10-25

## 2021-10-25 RX ORDER — ENALAPRILAT 1.25 MG/ML
1.25 INJECTION INTRAVENOUS EVERY 6 HOURS PRN
Status: DISCONTINUED | OUTPATIENT
Start: 2021-10-25 | End: 2021-10-28

## 2021-10-25 RX ORDER — ONDANSETRON 2 MG/ML
4 INJECTION INTRAMUSCULAR; INTRAVENOUS EVERY 4 HOURS PRN
Status: DISCONTINUED | OUTPATIENT
Start: 2021-10-25 | End: 2021-11-03 | Stop reason: HOSPADM

## 2021-10-25 RX ORDER — PROCHLORPERAZINE EDISYLATE 5 MG/ML
5-10 INJECTION INTRAMUSCULAR; INTRAVENOUS EVERY 4 HOURS PRN
Status: DISCONTINUED | OUTPATIENT
Start: 2021-10-25 | End: 2021-11-03 | Stop reason: HOSPADM

## 2021-10-25 RX ORDER — LABETALOL HYDROCHLORIDE 5 MG/ML
10 INJECTION, SOLUTION INTRAVENOUS EVERY 4 HOURS PRN
Status: DISCONTINUED | OUTPATIENT
Start: 2021-10-25 | End: 2021-11-03 | Stop reason: HOSPADM

## 2021-10-25 RX ORDER — ACETAMINOPHEN 325 MG/1
650 TABLET ORAL EVERY 6 HOURS PRN
Status: DISCONTINUED | OUTPATIENT
Start: 2021-10-25 | End: 2021-11-03 | Stop reason: HOSPADM

## 2021-10-25 RX ORDER — EZETIMIBE 10 MG/1
10 TABLET ORAL EVERY EVENING
Status: DISCONTINUED | OUTPATIENT
Start: 2021-10-25 | End: 2021-11-03 | Stop reason: HOSPADM

## 2021-10-25 RX ORDER — OMEPRAZOLE 20 MG/1
20 CAPSULE, DELAYED RELEASE ORAL EVERY MORNING
Status: DISCONTINUED | OUTPATIENT
Start: 2021-10-25 | End: 2021-11-03 | Stop reason: HOSPADM

## 2021-10-25 RX ORDER — TRAZODONE HYDROCHLORIDE 50 MG/1
100 TABLET ORAL
Status: DISCONTINUED | OUTPATIENT
Start: 2021-10-25 | End: 2021-11-03 | Stop reason: HOSPADM

## 2021-10-25 RX ORDER — PROMETHAZINE HYDROCHLORIDE 25 MG/1
12.5-25 TABLET ORAL EVERY 4 HOURS PRN
Status: DISCONTINUED | OUTPATIENT
Start: 2021-10-25 | End: 2021-11-03 | Stop reason: HOSPADM

## 2021-10-25 RX ORDER — HYDROMORPHONE HYDROCHLORIDE 1 MG/ML
0.5 INJECTION, SOLUTION INTRAMUSCULAR; INTRAVENOUS; SUBCUTANEOUS EVERY 6 HOURS PRN
Status: DISCONTINUED | OUTPATIENT
Start: 2021-10-25 | End: 2021-10-29

## 2021-10-25 RX ORDER — PROMETHAZINE HYDROCHLORIDE 25 MG/1
12.5-25 SUPPOSITORY RECTAL EVERY 4 HOURS PRN
Status: DISCONTINUED | OUTPATIENT
Start: 2021-10-25 | End: 2021-11-03 | Stop reason: HOSPADM

## 2021-10-25 RX ORDER — INSULIN LISPRO 100 [IU]/ML
1-6 INJECTION, SOLUTION INTRAVENOUS; SUBCUTANEOUS EVERY 6 HOURS
Status: DISCONTINUED | OUTPATIENT
Start: 2021-10-25 | End: 2021-10-27

## 2021-10-25 RX ORDER — OXYCODONE HYDROCHLORIDE 10 MG/1
10 TABLET ORAL EVERY 6 HOURS PRN
Status: DISCONTINUED | OUTPATIENT
Start: 2021-10-25 | End: 2021-10-29

## 2021-10-25 RX ORDER — DIPHENHYDRAMINE HCL 25 MG
25 TABLET ORAL EVERY 6 HOURS PRN
Status: DISCONTINUED | OUTPATIENT
Start: 2021-10-25 | End: 2021-11-03 | Stop reason: HOSPADM

## 2021-10-25 RX ORDER — METOPROLOL SUCCINATE 100 MG/1
200 TABLET, EXTENDED RELEASE ORAL DAILY
Status: DISCONTINUED | OUTPATIENT
Start: 2021-10-25 | End: 2021-10-28

## 2021-10-25 RX ORDER — BISACODYL 10 MG
10 SUPPOSITORY, RECTAL RECTAL
Status: DISCONTINUED | OUTPATIENT
Start: 2021-10-25 | End: 2021-11-03 | Stop reason: HOSPADM

## 2021-10-25 RX ADMIN — URSODIOL 300 MG: 300 CAPSULE ORAL at 08:58

## 2021-10-25 RX ADMIN — OXYCODONE HYDROCHLORIDE 10 MG: 10 TABLET ORAL at 11:57

## 2021-10-25 RX ADMIN — INSULIN LISPRO 2 UNITS: 100 INJECTION, SOLUTION INTRAVENOUS; SUBCUTANEOUS at 11:51

## 2021-10-25 RX ADMIN — HYDROMORPHONE HYDROCHLORIDE 0.5 MG: 1 INJECTION, SOLUTION INTRAMUSCULAR; INTRAVENOUS; SUBCUTANEOUS at 21:46

## 2021-10-25 RX ADMIN — OMEPRAZOLE 20 MG: 20 CAPSULE, DELAYED RELEASE ORAL at 05:34

## 2021-10-25 RX ADMIN — HYDROMORPHONE HYDROCHLORIDE 0.5 MG: 1 INJECTION, SOLUTION INTRAMUSCULAR; INTRAVENOUS; SUBCUTANEOUS at 02:39

## 2021-10-25 RX ADMIN — HYDROMORPHONE HYDROCHLORIDE 0.5 MG: 1 INJECTION, SOLUTION INTRAMUSCULAR; INTRAVENOUS; SUBCUTANEOUS at 09:11

## 2021-10-25 RX ADMIN — INSULIN LISPRO 5 UNITS: 100 INJECTION, SOLUTION INTRAVENOUS; SUBCUTANEOUS at 04:14

## 2021-10-25 RX ADMIN — EZETIMIBE 10 MG: 10 TABLET ORAL at 17:05

## 2021-10-25 RX ADMIN — OXYCODONE HYDROCHLORIDE 10 MG: 10 TABLET ORAL at 18:26

## 2021-10-25 RX ADMIN — TRAZODONE HYDROCHLORIDE 100 MG: 50 TABLET ORAL at 21:40

## 2021-10-25 RX ADMIN — HYDROMORPHONE HYDROCHLORIDE 0.5 MG: 1 INJECTION, SOLUTION INTRAMUSCULAR; INTRAVENOUS; SUBCUTANEOUS at 15:13

## 2021-10-25 RX ADMIN — ACETAMINOPHEN 650 MG: 325 TABLET ORAL at 04:35

## 2021-10-25 RX ADMIN — SODIUM CHLORIDE, POTASSIUM CHLORIDE, SODIUM LACTATE AND CALCIUM CHLORIDE: 600; 310; 30; 20 INJECTION, SOLUTION INTRAVENOUS at 03:56

## 2021-10-25 RX ADMIN — PIPERACILLIN SODIUM AND TAZOBACTAM SODIUM 3.38 G: 3; .375 INJECTION, POWDER, LYOPHILIZED, FOR SOLUTION INTRAVENOUS at 13:00

## 2021-10-25 RX ADMIN — SERTRALINE HYDROCHLORIDE 100 MG: 100 TABLET, FILM COATED ORAL at 17:08

## 2021-10-25 RX ADMIN — PIPERACILLIN SODIUM AND TAZOBACTAM SODIUM 3.38 G: 3; .375 INJECTION, POWDER, LYOPHILIZED, FOR SOLUTION INTRAVENOUS at 06:10

## 2021-10-25 RX ADMIN — INSULIN LISPRO 2 UNITS: 100 INJECTION, SOLUTION INTRAVENOUS; SUBCUTANEOUS at 16:59

## 2021-10-25 RX ADMIN — LEVOTHYROXINE SODIUM 225 MCG: 0.12 TABLET ORAL at 05:33

## 2021-10-25 RX ADMIN — HALOPERIDOL LACTATE 2 MG: 5 INJECTION, SOLUTION INTRAMUSCULAR at 00:30

## 2021-10-25 RX ADMIN — METOPROLOL SUCCINATE 200 MG: 100 TABLET, EXTENDED RELEASE ORAL at 05:33

## 2021-10-25 RX ADMIN — PIPERACILLIN SODIUM AND TAZOBACTAM SODIUM 3.38 G: 3; .375 INJECTION, POWDER, LYOPHILIZED, FOR SOLUTION INTRAVENOUS at 21:40

## 2021-10-25 RX ADMIN — OXYCODONE HYDROCHLORIDE 10 MG: 10 TABLET ORAL at 04:35

## 2021-10-25 RX ADMIN — ALPRAZOLAM 0.5 MG: 0.5 TABLET ORAL at 04:18

## 2021-10-25 RX ADMIN — AMLODIPINE BESYLATE 5 MG: 10 TABLET ORAL at 17:05

## 2021-10-25 RX ADMIN — PIPERACILLIN AND TAZOBACTAM 3.38 G: 3; .375 INJECTION, POWDER, LYOPHILIZED, FOR SOLUTION INTRAVENOUS; PARENTERAL at 04:07

## 2021-10-25 ASSESSMENT — COGNITIVE AND FUNCTIONAL STATUS - GENERAL
MOBILITY SCORE: 24
DAILY ACTIVITIY SCORE: 24
SUGGESTED CMS G CODE MODIFIER MOBILITY: CH
SUGGESTED CMS G CODE MODIFIER DAILY ACTIVITY: CH

## 2021-10-25 ASSESSMENT — PATIENT HEALTH QUESTIONNAIRE - PHQ9
7. TROUBLE CONCENTRATING ON THINGS, SUCH AS READING THE NEWSPAPER OR WATCHING TELEVISION: NEARLY EVERY DAY
8. MOVING OR SPEAKING SO SLOWLY THAT OTHER PEOPLE COULD HAVE NOTICED. OR THE OPPOSITE, BEING SO FIGETY OR RESTLESS THAT YOU HAVE BEEN MOVING AROUND A LOT MORE THAN USUAL: NOT AT ALL
3. TROUBLE FALLING OR STAYING ASLEEP OR SLEEPING TOO MUCH: SEVERAL DAYS
SUM OF ALL RESPONSES TO PHQ QUESTIONS 1-9: 10
SUM OF ALL RESPONSES TO PHQ9 QUESTIONS 1 AND 2: 2
5. POOR APPETITE OR OVEREATING: NOT AT ALL
4. FEELING TIRED OR HAVING LITTLE ENERGY: NEARLY EVERY DAY
2. FEELING DOWN, DEPRESSED, IRRITABLE, OR HOPELESS: SEVERAL DAYS
1. LITTLE INTEREST OR PLEASURE IN DOING THINGS: SEVERAL DAYS
6. FEELING BAD ABOUT YOURSELF - OR THAT YOU ARE A FAILURE OR HAVE LET YOURSELF OR YOUR FAMILY DOWN: SEVERAL DAYS
9. THOUGHTS THAT YOU WOULD BE BETTER OFF DEAD, OR OF HURTING YOURSELF: NOT AT ALL

## 2021-10-25 ASSESSMENT — ENCOUNTER SYMPTOMS
SHORTNESS OF BREATH: 0
WHEEZING: 0
PALPITATIONS: 0
DIZZINESS: 0
SORE THROAT: 0
FEVER: 0
ABDOMINAL PAIN: 1
MEMORY LOSS: 0
EYE REDNESS: 0
NERVOUS/ANXIOUS: 1
POLYDIPSIA: 0
EYE DISCHARGE: 0
STRIDOR: 0
ORTHOPNEA: 0
CONSTIPATION: 1
VOMITING: 0
CHILLS: 0
WEAKNESS: 1
BLOOD IN STOOL: 0
MYALGIAS: 0
INSOMNIA: 0
BACK PAIN: 1
NAUSEA: 1
COUGH: 0
EYE PAIN: 0

## 2021-10-25 ASSESSMENT — PAIN DESCRIPTION - PAIN TYPE
TYPE: ACUTE PAIN

## 2021-10-25 ASSESSMENT — LIFESTYLE VARIABLES
CONSUMPTION TOTAL: NEGATIVE
DOES PATIENT WANT TO STOP DRINKING: NO
ALCOHOL_USE: NO
TOTAL SCORE: 0
AVERAGE NUMBER OF DAYS PER WEEK YOU HAVE A DRINK CONTAINING ALCOHOL: 0
EVER HAD A DRINK FIRST THING IN THE MORNING TO STEADY YOUR NERVES TO GET RID OF A HANGOVER: NO
HAVE PEOPLE ANNOYED YOU BY CRITICIZING YOUR DRINKING: NO
HOW MANY TIMES IN THE PAST YEAR HAVE YOU HAD 5 OR MORE DRINKS IN A DAY: 0
TOTAL SCORE: 0
ON A TYPICAL DAY WHEN YOU DRINK ALCOHOL HOW MANY DRINKS DO YOU HAVE: 0
HAVE YOU EVER FELT YOU SHOULD CUT DOWN ON YOUR DRINKING: NO
TOTAL SCORE: 0
EVER FELT BAD OR GUILTY ABOUT YOUR DRINKING: NO

## 2021-10-25 NOTE — H&P
Hospital Medicine History & Physical Note    Date of Service  10/25/2021    Primary Care Physician  Jarrell Yates M.D.    Consultants  general surgery    Specialist Names: Dr. Mckinney    Code Status  Full Code    Chief Complaint  Chief Complaint   Patient presents with   • Abdominal Pain       History of Presenting Illness  Anu Manuel is a 66 y.o. female former smoker past medical history coronary artery disease status post PCI and stent LAD August 2020, hypertension, hyperlipidemia, type 1 diabetes mellitus, chronic liver dysfunction being worked up for possible autoimmune hepatitis starting August 2021, who presented 10/24/2021 with worsening abdominal pain.  Patient has been to multiple facilities over the past few weeks due to her persistent and recurrent abdominal pain.  Patient was recently seen at [facility] 3 weeks ago where she was admitted for 1 week and is undergoing work-up of autoimmune hepatitis, she did receive a liver biopsy.  MRCP performed 1 month ago no hepatic or pancreatic mass, no biliary dilatation at that time.  Multiple ultrasounds since that time showed cholelithiasis without cholecystitis.  Prior Doppler the liver showed normal blood flow, hepatitis panel is negative.  On chart review [facility] interpretation of liver biopsy showed features of large bile duct obstruction mild macrovesicular steatosis.  She has pending hepatology appointment.  She discharged from our facility on 10/21 cholecystectomy not recommended at that time.  She was given fentanyl patch for pain control and had follow-up appointments with gastroenterology and primary care physician for pain management.  She did have multiple episodes of hypoglycemia.    Patient returns tonight for worsening abdominal pain characterized as achy and crampy sometimes sharp, rated as severe no aggravating or alleviating factors, course is progressive.  She denies any fevers, chest pain, dyspnea, cough, headaches.  Work-up does  reveal slightly worse T bili, persistent transaminitis, normal lipase, leukocytosis.  Right upper quadrant ultrasound obtained does indicate cholecystitis, Cifuentes sign is positive.  ERP discussed case with general surgery Dr. Mckinney who recommends admitting the patient and will proceed with ERCP.  Patient subsequently referred to hospitalist for admission.    I discussed the plan of care with patient, bedside RN, pharmacy and ERP.    Review of Systems  Review of Systems   All other systems reviewed and are negative.      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    Surgical History   has a past surgical history that includes hysterectomy, vaginal (2006); thyroid lobectomy (1973); lumpectomy (1976, 2005); cervical disk and fusion anterior (03/12/08); tonsillectomy (1963); cervical fusion posterior (1/16/2009); hardware removal neuro (1/16/2009); neck exploration (1/16/2009); abdominal hysterectomy total; lumpectomy; lumbar laminectomy diskectomy (Right, 5/10/2016); shoulder decompression arthroscopic (6/17/08); clavicle distal excision (6/17/08); shoulder arthroscopy w/ rotator cuff repair (10/9/08); pr njx aa&/strd tfrml epi lumbar/sacral 1 level (Right, 8/31/2016); spinal cord stimulator (N/A, 10/26/2018); thoracic laminectomy (N/A, 10/26/2018); appendectomy (2004); pr implant neurostim/ (N/A, 12/16/2019); irrigation & debridement neuro (1/19/2020);  and cath placement (2020).     Family History  family history includes Cancer in her father; Hypertension in her mother.        Social History   reports that she has been smoking cigarettes. She has a 15.00 pack-year smoking history. She has never used smokeless tobacco. She reports previous alcohol use. She reports current drug use.    Allergies  Allergies   Allergen Reactions   • Tape Unspecified     Blisters, paper tape is ok       Medications  Prior to Admission Medications   Prescriptions Last Dose Informant Patient Reported? Taking?   ALPRAZolam (XANAX) 0.5 MG Tab 10/23/2021 at hs Patient Yes No   Sig: Take 0.5 mg by mouth at bedtime.   Ascorbic Acid (VITAMIN C) 250 MG Tab 10/24/2021 at am Patient Yes No   Sig: Take 250 mg by mouth every morning.   B Complex Vitamins (VITAMIN B COMPLEX PO) 10/24/2021 at am Patient Yes No   Sig: Take 1 Tablet by mouth every morning.   Continuous Blood Gluc Sensor (StoneRiverYLE PARISA 14 DAY SENSOR) Misc supply at supply Patient Yes No   Si MISCELLANEOUS E10.42 CHANGE SENSOR EVERY 14 DAYS   E11.65   Docusate Calcium (STOOL SOFTENER PO) 10/24/2021 at am Patient Yes No   Sig: Take 1 Tablet by mouth every morning.   SYNTHROID 200 MCG Tab 10/24/2021 at am Patient Yes No   Sig: Take 200 mcg by mouth every morning. 200mcg tablet + 25mcg tablet for a total dose of 225mcg   TRESIBA FLEXTOUCH 100 UNIT/ML Solution Pen-injector 10/23/2021 at pm Patient Yes No   Sig: Inject 24 Units under the skin every evening.   amLODIPine (NORVASC) 10 MG Tab 10/23/2021 at pm Patient Yes No   Sig: Take 5 mg by mouth every evening.   aspirin 81 MG EC tablet 10/24/2021 at am Patient No No   Sig: Take 1 Tab by mouth every morning.   diphenhydrAMINE (BENADRYL ALLERGY) 25 MG capsule prn at prn  No No   Sig: Take 1 Capsule by mouth every 6 hours as needed for Itching.   ezetimibe (ZETIA) 10 MG Tab 10/23/2021 at pm Patient Yes No   Sig: Take 10 mg by mouth every evening.   famotidine (PEPCID) 20 MG Tab  10/24/2021 at am Patient Yes No   Sig: Take 20 mg by mouth 2 times a day.   fentaNYL (DURAGESIC) 25 MCG/HR PATCH 72 HR 10/24/2021 at Unknown time  No No   Sig: Place 1 Patch on the skin every 72 hours for 15 days.   insulin aspart (NOVOLOG FLEXPEN) 100 UNIT/ML injection PEN 10/24/2021 at 1200 Patient Yes No   Sig: Inject 1-5 Units under the skin in the morning, at noon, and at bedtime. 1 units for every 5 g of carbs   AND  Sliding Scale   150 - 200 = 1 units  201 - 250 = 2 units  251 - 300 = 3 units  301 - 350 = 4 units  351 - 400 = 5 units   levothyroxine (SYNTHROID) 25 MCG Tab 10/24/2021 at am Patient Yes No   Sig: Take 25 mcg by mouth every morning on an empty stomach. 25mcg tablet + 200mcg tablet for a total dose of 225mcg   metoprolol (TOPROL-XL) 200 MG XL tablet 10/24/2021 at am Patient No No   Sig: Take 1 tablet by mouth every day.   ondansetron (ZOFRAN ODT) 4 MG TABLET DISPERSIBLE prn at prn Patient No No   Sig: Take 1 Tablet by mouth every 6 hours as needed for Nausea.   oxycodone 5 MG capsule 10/24/2021 at pm Patient Yes No   Sig: Take 5 mg by mouth every four hours as needed for Severe Pain.   pantoprazole (PROTONIX) 40 MG Tablet Delayed Response 10/24/2021 at am Patient Yes No   Sig: Take 40 mg by mouth every morning.   pyridoxine (VITAMIN B-6) 50 MG Tab 10/24/2021 at am Patient Yes No   Sig: Take 50 mg by mouth every morning.   sertraline (ZOLOFT) 100 MG Tab 10/23/2021 at pm Patient Yes No   Sig: Take 100 mg by mouth every evening.   traZODone (DESYREL) 100 MG Tab 10/23/2021 at pm Patient Yes No   Sig: Take 100 mg by mouth at bedtime as needed for Sleep.   ursodiol (ACTIGALL) 300 MG Cap 10/24/2021 at am  No No   Sig: Take 1 Capsule by mouth every day.   vitamin D3 (QC VITAMIN D3) 5000 Unit (125 mcg) Tab 10/24/2021 at am Patient Yes No   Sig: Take 5,000 Units by mouth 2 times a day.      Facility-Administered Medications: None       Physical Exam  Temp:  [36.4 °C (97.6 °F)] 36.4 °C (97.6 °F)  Pulse:   [66] 66  Resp:  [20] 20  BP: (154-163)/(74-83) 163/83  SpO2:  [93 %] 93 %  Blood Pressure: (!) 163/83 (Left arm)   Temperature: 36.4 °C (97.6 °F)   Pulse: 66   Respiration: 20   Pulse Oximetry: 93 %       Physical Exam  Vitals and nursing note reviewed. Exam conducted with a chaperone present.   Constitutional:       General: She is not in acute distress.     Appearance: She is normal weight. She is ill-appearing. She is not toxic-appearing.      Comments: 66-year-old female appears older than stated age, appears chronically ill, alert and conversant, uncomfortable secondary to abdominal pain, able to speak full sentences without becoming breathless, pleasant mood   HENT:      Head: Normocephalic and atraumatic.      Nose: Nose normal. No rhinorrhea.      Mouth/Throat:      Mouth: Mucous membranes are moist.      Pharynx: Oropharynx is clear.   Eyes:      General: Scleral icterus present.      Extraocular Movements: Extraocular movements intact.      Conjunctiva/sclera: Conjunctivae normal.      Pupils: Pupils are equal, round, and reactive to light.   Cardiovascular:      Rate and Rhythm: Normal rate and regular rhythm.      Pulses: Normal pulses.      Heart sounds: Murmur (soft systolic) heard.     Pulmonary:      Effort: Pulmonary effort is normal. No respiratory distress.      Breath sounds: No wheezing, rhonchi or rales.   Abdominal:      General: Bowel sounds are normal.      Palpations: Abdomen is soft.      Tenderness: There is abdominal tenderness (generalized, worst in RUQ, Cifuentes's sign +). There is no guarding or rebound.      Comments: Non-peritoneal    Musculoskeletal:         General: No deformity or signs of injury. Normal range of motion.      Cervical back: Normal range of motion and neck supple.   Skin:     General: Skin is warm and dry.      Capillary Refill: Capillary refill takes less than 2 seconds.      Coloration: Skin is jaundiced.      Comments: Circular Macular rash to right hand,  scattered bruises   Neurological:      General: No focal deficit present.      Mental Status: She is alert and oriented to person, place, and time. Mental status is at baseline.      Cranial Nerves: No cranial nerve deficit.      Sensory: No sensory deficit.      Motor: No weakness.   Psychiatric:         Mood and Affect: Mood normal.         Behavior: Behavior normal.         Thought Content: Thought content normal.         Judgment: Judgment normal.         Laboratory:  Recent Labs     10/23/21  1930 10/24/21  2256   WBC 11.8* 11.6*   RBC 2.56* 2.82*   HEMOGLOBIN 8.4* 9.4*   HEMATOCRIT 27.2* 29.8*   .3* 105.7*   MCH 32.8 33.3*   MCHC 30.9* 31.5*   RDW 63.7* 64.6*   PLATELETCT 357 387   MPV 11.6 11.5     Recent Labs     10/22/21  0317 10/23/21  1930 10/24/21  2256   SODIUM 131* 125* 128*   POTASSIUM 3.7 4.6 3.8   CHLORIDE 98 93* 93*   CO2 21 23 22   GLUCOSE 59* 263* 289*   BUN 10 14 13   CREATININE 0.74 0.57 0.81   CALCIUM 8.3* 8.4 8.8     Recent Labs     10/22/21  0317 10/23/21  1930 10/24/21  2256   ALTSGPT 80* 93* 110*   ASTSGOT 112* 148* 180*   ALKPHOSPHAT 1833* 1968* 1948*   TBILIRUBIN 7.4* 8.8* 9.9*   LIPASE  --  11 12   GLUCOSE 59* 263* 289*         No results for input(s): NTPROBNP in the last 72 hours.      Recent Labs     10/23/21  1930   TROPONINT 47*       Imaging:  US-RUQ   Final Result         1.  Gallbladder sludge and cholelithiasis with gallbladder wall thickening and positive sonographic Cifuentes's sign, findings sonographically compatible with cholecystitis.   2.  Hepatomegaly and echogenic liver, compatible with fatty change versus fibrosis.          no X-Ray or EKG requiring interpretation    Assessment/Plan:  I anticipate this patient will require at least two midnights for appropriate medical management, necessitating inpatient admission.    Cholecystitis- (present on admission)  Assessment & Plan  Dr. Mckinney with general surgery consulted by ERCP, recommends admission and ERCP.  Given  rising T bili and transaminitis, ultrasound evidence for cholecystitis, worsening abdominal pain, leukocytosis, will obtain blood cultures and start Zosyn.    Hyperbilirubinemia- (present on admission)  Assessment & Plan  chronically elevated, was getting worked up for autoimmune hepatitis no formal diagnosis as of yet.  Worsening abdominal pain now found with cholecystitis on right upper quadrant ultrasound, CBD 3.7 mm.  Continue ursodiol.  Benadryl as needed    Hyponatremia- (present on admission)  Assessment & Plan  Urine studies ordered.  In the 120s for the past week,?  Association with liver disease.  Does appear hypovolemic.  Continue to trend and monitor    CAD S/P percutaneous coronary angioplasty- (present on admission)  Assessment & Plan  Continue home medications: Aspirin, Zetia, metoprolol.      Hypertension- (present on admission)  Assessment & Plan  Continue home amlodipine, metoprolol    Uncontrolled type 1 diabetes mellitus (HCC)- (present on admission)  Assessment & Plan  Insulin sliding scale  POC glucose every 6 hours while n.p.o.        VTE prophylaxis: SCDs/TEDs and pharmacologic prophylaxis contraindicated due to planned ERCP

## 2021-10-25 NOTE — CARE PLAN
The patient is Watcher - Medium risk of patient condition declining or worsening; d/t lab values.     Shift Goals  Clinical Goals: Pain/nausea control, rest  Patient Goals: Pain control, rest, surgery    Progress made toward(s) clinical / shift goals:  Medicated for pain/nausea/anxiety; see MAR. Patient stating pain so severe that we should be able to take care of it at hospital. Patient slept most of shift after being settled.     Patient is not progressing towards the following goals: NA.

## 2021-10-25 NOTE — ED PROVIDER NOTES
"ED Provider Note    CHIEF COMPLAINT  Chief Complaint   Patient presents with   • Abdominal Pain       HPI  Anu Manuel is a 66 y.o. female who presents to the emergency room and for persistent recurrent abdominal pain. Patient with multiple ER evaluation for the same. Patient complained of chronic abdominal pain. Known history as documented below. States that she does not have appropriate management or control at home. Was recently at [facility] for approximate one week three weeks ago. She was at that time she was prescribed narcotic but is since run out. Nasty and the pain is worse and uncontrollable.    Chart review reveals the patient was seen twice yesterday. Laboratory evaluation, ultrasonography as well as CT imaging all completed with no acute abnormalities or findings.    Tonight patient brought in by EMS. Ketamine given prior to arrival. Patient continues to complain of \"severe pain\".     REVIEW OF SYSTEMS  See HPI for further details. All other systems are negative.     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    SOCIAL HISTORY  Social History     Tobacco Use   • Smoking status: Current Every Day Smoker     Packs/day: 0.50     Years: 30.00     Pack years: 15.00     Types: Cigarettes   • Smokeless tobacco: Never Used   Vaping Use   • Vaping Use: Never used   Substance and Sexual Activity   • Alcohol use: Not Currently   • Drug use: Yes     " Comment: Marijuana   • Sexual activity: Not on file       SURGICAL HISTORY   has a past surgical history that includes hysterectomy, vaginal (2006); thyroid lobectomy (1973); lumpectomy (1976, 2005); cervical disk and fusion anterior (03/12/08); tonsillectomy (1963); cervical fusion posterior (1/16/2009); hardware removal neuro (1/16/2009); neck exploration (1/16/2009); abdominal hysterectomy total; lumpectomy; lumbar laminectomy diskectomy (Right, 5/10/2016); shoulder decompression arthroscopic (6/17/08); clavicle distal excision (6/17/08); shoulder arthroscopy w/ rotator cuff repair (10/9/08); njx aa&/strd tfrml epi lumbar/sacral 1 level (Right, 8/31/2016); spinal cord stimulator (N/A, 10/26/2018); thoracic laminectomy (N/A, 10/26/2018); appendectomy (2004); implant neurostim/ (N/A, 12/16/2019); irrigation & debridement neuro (1/19/2020); and cath placement (1/25/2020).    CURRENT MEDICATIONS  Home Medications     Reviewed by Stone Agee (Pharmacy Tech) on 10/25/21 at 0209  Med List Status: Complete   Medication Last Dose Status   ALPRAZolam (XANAX) 0.5 MG Tab 10/23/2021 Active   amLODIPine (NORVASC) 10 MG Tab 10/23/2021 Active   Ascorbic Acid (VITAMIN C) 250 MG Tab 10/24/2021 Active   aspirin 81 MG EC tablet 10/24/2021 Active   B Complex Vitamins (VITAMIN B COMPLEX PO) 10/24/2021 Active   Continuous Blood Gluc Sensor (FREESTYLE PARISA 14 DAY SENSOR) Misc supply Active   diphenhydrAMINE (BENADRYL ALLERGY) 25 MG capsule prn Active   Docusate Calcium (STOOL SOFTENER PO) 10/24/2021 Active   ezetimibe (ZETIA) 10 MG Tab 10/23/2021 Active   famotidine (PEPCID) 20 MG Tab 10/24/2021 Active   fentaNYL (DURAGESIC) 25 MCG/HR PATCH 72 HR 10/24/2021 Active   insulin aspart (NOVOLOG FLEXPEN) 100 UNIT/ML injection PEN 10/24/2021 Active   levothyroxine (SYNTHROID) 25 MCG Tab 10/24/2021 Active   metoprolol (TOPROL-XL) 200 MG XL tablet 10/24/2021 Active   ondansetron (ZOFRAN ODT) 4 MG TABLET DISPERSIBLE prn Active  "  oxycodone 5 MG capsule 10/24/2021 Active   pantoprazole (PROTONIX) 40 MG Tablet Delayed Response 10/24/2021 Active   pyridoxine (VITAMIN B-6) 50 MG Tab 10/24/2021 Active   sertraline (ZOLOFT) 100 MG Tab 10/23/2021 Active   SYNTHROID 200 MCG Tab 10/24/2021 Active   traZODone (DESYREL) 100 MG Tab 10/23/2021 Active   TRESIBA FLEXTOUCH 100 UNIT/ML Solution Pen-injector 10/23/2021 Active   ursodiol (ACTIGALL) 300 MG Cap 10/24/2021 Active   vitamin D3 (QC VITAMIN D3) 5000 Unit (125 mcg) Tab 10/24/2021 Active                ALLERGIES  Allergies   Allergen Reactions   • Tape Unspecified     Blisters, paper tape is ok       PHYSICAL EXAM  VITAL SIGNS: BP (!) 163/83 Comment: Left arm  Pulse 66   Temp 36.4 °C (97.6 °F) (Temporal)   Resp 20   Ht 1.676 m (5' 6\")   Wt 65.8 kg (145 lb)   LMP 04/29/2005 (LMP Unknown)   SpO2 93%   BMI 23.40 kg/m²  @JOSE[[id number]::@   Pulse ox interpretation: I interpret this pulse ox as normal.  Constitutional: Alert in no apparent distress.  HENT: No signs of trauma, Bilateral external ears normal, Nose normal.   Eyes: Pupils are equal and reactive, slight scleral icterus   neck: Normal range of motion, No tenderness, Supple  Cardiovascular: Regular rate and rhythm, no murmurs.   Thorax & Lungs: Normal breath sounds, No respiratory distress, No wheezing, No chest tenderness.   Abdomen: Bowel sounds normal, Soft, ascites  Skin: Warm, Dry,mild jaundice  Extremities: Intact distal pulses.   Musculoskeletal: Good range of motion in all major joints. No tenderness to palpation or major deformities noted.   Neurologic: Alert , Normal motor function, Normal sensory function, No focal deficits noted.       DIAGNOSTIC STUDIES / PROCEDURES    LABS  Results for orders placed or performed during the hospital encounter of 10/24/21   CBC WITH DIFFERENTIAL   Result Value Ref Range    WBC 11.6 (H) 4.8 - 10.8 K/uL    RBC 2.82 (L) 4.20 - 5.40 M/uL    Hemoglobin 9.4 (L) 12.0 - 16.0 g/dL    Hematocrit 29.8 (L) " 37.0 - 47.0 %    .7 (H) 81.4 - 97.8 fL    MCH 33.3 (H) 27.0 - 33.0 pg    MCHC 31.5 (L) 33.6 - 35.0 g/dL    RDW 64.6 (H) 35.9 - 50.0 fL    Platelet Count 387 164 - 446 K/uL    MPV 11.5 9.0 - 12.9 fL    Neutrophils-Polys 79.90 (H) 44.00 - 72.00 %    Lymphocytes 8.00 (L) 22.00 - 41.00 %    Monocytes 8.40 0.00 - 13.40 %    Eosinophils 2.60 0.00 - 6.90 %    Basophils 0.60 0.00 - 1.80 %    Immature Granulocytes 0.50 0.00 - 0.90 %    Nucleated RBC 0.00 /100 WBC    Neutrophils (Absolute) 9.25 (H) 2.00 - 7.15 K/uL    Lymphs (Absolute) 0.93 (L) 1.00 - 4.80 K/uL    Monos (Absolute) 0.97 (H) 0.00 - 0.85 K/uL    Eos (Absolute) 0.30 0.00 - 0.51 K/uL    Baso (Absolute) 0.07 0.00 - 0.12 K/uL    Immature Granulocytes (abs) 0.06 0.00 - 0.11 K/uL    NRBC (Absolute) 0.00 K/uL   COMP METABOLIC PANEL   Result Value Ref Range    Sodium 128 (L) 135 - 145 mmol/L    Potassium 3.8 3.6 - 5.5 mmol/L    Chloride 93 (L) 96 - 112 mmol/L    Co2 22 20 - 33 mmol/L    Anion Gap 13.0 7.0 - 16.0    Glucose 289 (H) 65 - 99 mg/dL    Bun 13 8 - 22 mg/dL    Creatinine 0.81 0.50 - 1.40 mg/dL    Calcium 8.8 8.5 - 10.5 mg/dL    AST(SGOT) 180 (H) 12 - 45 U/L    ALT(SGPT) 110 (H) 2 - 50 U/L    Alkaline Phosphatase 1948 (H) 30 - 99 U/L    Total Bilirubin 9.9 (H) 0.1 - 1.5 mg/dL    Albumin 2.9 (L) 3.2 - 4.9 g/dL    Total Protein 6.5 6.0 - 8.2 g/dL    Globulin 3.6 (H) 1.9 - 3.5 g/dL    A-G Ratio 0.8 g/dL   LIPASE   Result Value Ref Range    Lipase 12 11 - 82 U/L   URINALYSIS    Specimen: Urine, Clean Catch   Result Value Ref Range    Color DK Yellow     Character Clear     Specific Gravity 1.037 <1.035    Ph 5.5 5.0 - 8.0    Glucose 250 (A) Negative mg/dL    Ketones Negative Negative mg/dL    Protein 300 (A) Negative mg/dL    Bilirubin Large (A) Negative    Urobilinogen, Urine 1.0 Negative    Nitrite Negative Negative    Leukocyte Esterase Trace (A) Negative    Occult Blood Small (A) Negative    Micro Urine Req Microscopic    ESTIMATED GFR   Result Value Ref  Range    GFR If African American >60 >60 mL/min/1.73 m 2    GFR If Non African American >60 >60 mL/min/1.73 m 2   URINE MICROSCOPIC (W/UA)   Result Value Ref Range    WBC 0-2 /hpf    RBC 0-2 /hpf    Bacteria Negative None /hpf    Epithelial Cells Moderate (A) /hpf         RADIOLOGY  US-RUQ   Final Result         1.  Gallbladder sludge and cholelithiasis with gallbladder wall thickening and positive sonographic Cifuentes's sign, findings sonographically compatible with cholecystitis.   2.  Hepatomegaly and echogenic liver, compatible with fatty change versus fibrosis.            0144: d/w Dr. Mckinney. We agree pt. Should be brought in for ERCP and likely stent placement.       COURSE & MEDICAL DECISION MAKING  Pertinent Labs & Imaging studies reviewed. (See chart for details)  66-year-old female presented to the emergency department with acute on chronic abdominal pain. Laboratory valuation as above. Persistent elevated LFTs. Slightly increasing total bilirubin. WBCs remained the same. Ultrasound as above. I discussed case with Dr. Mckinney on call for general surgery. This point of the hospitalist admit and further imaging can be completed and likely stent placement. Patient will be kept NPO.    FINAL IMPRESSION  1. Biliary obstruction    2. Elevated LFTs    3. Elevated bilirubin            Electronically signed by: Pieter Thomas M.D., 10/24/2021 10:23 PM

## 2021-10-25 NOTE — PROGRESS NOTES
Received report from Charge RN; assumed care once patient arrived to floor. Noted highly anxious and calling out d/t pain. Medicated for pain/anxiety; see MAR. Heat packs applied. A&O x 4. HTN noted upon arrival, improved with medications. 94% on RA, dips to 89% when holds breathe. Patient denied SOB, numbness, tingling, nausea, vomiting, dizziness. Abdomen pain-sharp/shooting/aching. Round, soft abdomen. Hypoactive BS X 4 noted. MIVF/IV antibiotics started. Patient stated voided in ED. 2 RN skin check performed upon arrival to floor; omitted LFA scabbing from scratching. Patient up SBA from gurney to bed and to/from bathroom x 2; steady gait noted. Intermittent abdominal guarding noted with ambulation. Admission profiled completed. Patient oriented to room, floor routine, fall risk, call light use; verbalized understanding. POC/impending procedure discussed. Questions answered. Bed locked/lowest position. Call light/personal belongings within reach. All needs met/patient slept intermittently after arrival to floor.     CHG bath performed.

## 2021-10-25 NOTE — ED TRIAGE NOTES
Anu Chellydanilo Manuel  66 y.o. female  Chief Complaint   Patient presents with   • Abdominal Pain     BIBA from home, seen twice yesterday for the same c/o    Reporting epigastric abdominal pain radiating to the back x 2 weeks. Constant, sharp 10/10. Denies N/V, +flatus and no changes to bowel habits.    Given 19mg ketamine IM by [facility]  Fentanyl patch provided at [facility] yesterday was removed by EMS    Pt also reporting possible liver failure diagnosis, reporting issues for the last 6 weeks. States jaundice started 2 weeks ago.

## 2021-10-25 NOTE — ED NOTES
Med Rec completed: per pt at bedside      No ORAL antibiotics in last 30 days    Preferred Pharmacy: [facility]     Pt confirmed following allergies:  Allergies   Allergen Reactions   • Tape Unspecified     Blisters, paper tape is ok      Pt's home medications:   Medication Sig   • diphenhydrAMINE (BENADRYL ALLERGY) 25 MG capsule Take 1 Capsule by mouth every 6 hours as needed for Itching.   • fentaNYL (DURAGESIC) 25 MCG/HR PATCH 72 HR Place 1 Patch on the skin every 72 hours for 15 days.   • ursodiol (ACTIGALL) 300 MG Cap Take 1 Capsule by mouth every day.   • Ascorbic Acid (VITAMIN C) 250 MG Tab Take 250 mg by mouth every morning.   • vitamin D3 (QC VITAMIN D3) 5000 Unit (125 mcg) Tab Take 5,000 Units by mouth 2 times a day.   • pyridoxine (VITAMIN B-6) 50 MG Tab Take 50 mg by mouth every morning.   • famotidine (PEPCID) 20 MG Tab Take 20 mg by mouth 2 times a day.   • insulin aspart (NOVOLOG FLEXPEN) 100 UNIT/ML injection PEN Inject 1-5 Units under the skin in the morning, at noon, and at bedtime. 1 units for every 5 g of carbs   AND  Sliding Scale   150 - 200 = 1 units  201 - 250 = 2 units  251 - 300 = 3 units  301 - 350 = 4 units  351 - 400 = 5 units   • levothyroxine (SYNTHROID) 25 MCG Tab Take 25 mcg by mouth every morning on an empty stomach. 25mcg tablet + 200mcg tablet for a total dose of 225mcg   • B Complex Vitamins (VITAMIN B COMPLEX PO) Take 1 Tablet by mouth every morning.   • Docusate Calcium (STOOL SOFTENER PO) Take 1 Tablet by mouth every morning.   • traZODone (DESYREL) 100 MG Tab Take 100 mg by mouth at bedtime as needed for Sleep.   • pantoprazole (PROTONIX) 40 MG Tablet Delayed Response Take 40 mg by mouth every morning.   • oxycodone 5 MG capsule Take 5 mg by mouth every four hours as needed for Severe Pain.   • ondansetron (ZOFRAN ODT) 4 MG TABLET DISPERSIBLE Take 1 Tablet by mouth every 6 hours as needed for Nausea.   • ezetimibe (ZETIA) 10 MG Tab Take 10 mg by mouth every evening.   •  TRESIBA FLEXTOUCH 100 UNIT/ML Solution Pen-injector Inject 24 Units under the skin every evening.   • ALPRAZolam (XANAX) 0.5 MG Tab Take 0.5 mg by mouth at bedtime.   • sertraline (ZOLOFT) 100 MG Tab Take 100 mg by mouth every evening.   • amLODIPine (NORVASC) 10 MG Tab Take 5 mg by mouth every evening.   • SYNTHROID 200 MCG Tab Take 200 mcg by mouth every morning. 200mcg tablet + 25mcg tablet for a total dose of 225mcg   • metoprolol (TOPROL-XL) 200 MG XL tablet Take 1 tablet by mouth every day.   • Continuous Blood Gluc Sensor (FREESTYLE PARISA 14 DAY SENSOR) Misc 1 MISCELLANEOUS E10.42 CHANGE SENSOR EVERY 14 DAYS   E11.65   • aspirin 81 MG EC tablet Take 1 Tab by mouth every morning.

## 2021-10-25 NOTE — ASSESSMENT & PLAN NOTE
Unclear etiology  Ursodiol  Follow up with Gastroenterology and [facility] hepatology, clinic, reinforced with patient the importance of this and she UNDERSTANDS and verbalizes back to me  -Liver labs somewhat better today, but overall stable

## 2021-10-25 NOTE — CARE PLAN
Problem: Knowledge Deficit - Standard  Goal: Patient and family/care givers will demonstrate understanding of plan of care, disease process/condition, diagnostic tests and medications  Outcome: Progressing  Note: Discussed POC with pt. Verbalized and demonstrated understanding     Problem: Pain - Standard  Goal: Alleviation of pain or a reduction in pain to the patient’s comfort goal  Outcome: Progressing  Note: Pt experiencing severe abd pain. Medicated per mar with PRN pain medications     Problem: Fall Risk  Goal: Patient will remain free from falls  Outcome: Progressing  Note: Pt rating as a high fall risk per vandana bueno. Bed alarm placed on bed. Educated on fall risk level and precautions in place.    The patient is Stable - Low risk of patient condition declining or worsening    Shift Goals  Clinical Goals: pain control  Patient Goals: pain control    Progress made toward(s) clinical / shift goals:  Pt medicated per MAR for pain. GI consult completed and sx to be scheduled.     Patient is not progressing towards the following goals:

## 2021-10-25 NOTE — ASSESSMENT & PLAN NOTE
Finished course of IV Zosyn  ERCP with sphincterotomy and biliary stent placement 10/26/2021  Laparoscopic cholecystectomy 10/28/2021  Encourage IS, pain control, mobilize  -drain to be removed on 11/3 and then discharge home after that  -clinically improving

## 2021-10-25 NOTE — PROGRESS NOTES
2 RN skin check complete.   Devices in place: PIV RFA  Skin assessed under devices: above.     Generalized jaundice to skin. Mild periorbital swelling. Freestyle glucose monitor in place to left upper arm. Multiple previous healed surgical scars to abdomen/back. Pinpoint red marks to abdomen. Blanchable redness to sacrum.     Confirmed pressure ulcers found on: NA.   New potential pressure ulcers noted on: NA.    Wound consult placed: NA.     The following interventions in place: Multiple blankets/pillows.

## 2021-10-25 NOTE — CONSULTS
DATE OF SERVICE:  10/25/2021     REFERRING PHYSICIAN:  Dixon Chicas MD     REASON FOR ADMISSION:  Likely cholecystitis with cholelithiasis and   choledocholithiasis.     HISTORY OF PRESENT ILLNESS:  The patient is 66 years of age, presents with   abdominal pain.  She has been having ongoing waxing and waning abdominal pain   since 08/2021.  At that time, she was found to have elevated liver function   test.  She actually underwent a workup for autoimmune hepatitis.  She had an   MRCP a month ago, which did not show biliary dilatation at that time or a   pancreatic mass.  The patient was recently admitted and underwent a liver   biopsy, which was consistent with large bile duct obstruction with mild   microvascular steatosis, but no definite autoimmune hepatitis findings.  The   patient was here just a few days ago and was discharged with a pink fentanyl   patch for pain control and followup appointment with gastroenterology for pain   management.  The patient had worsening abdominal pain overnight.  She was   admitted with worsening liver function tests.  Ultrasound showed a positive   Cifuentes sign.  Antibiotics were initiated.  The patient was felt most likely to   have choledocholithiasis and is being admitted now for gastroenterology   consultation with ERCP and then probably eventually cholecystectomy.     PAST MEDICAL HISTORY:  Significant for coronary artery disease with a stent in   the LAD in 2020.  She also has a history of chronic tobacco use although she   quit, type 1 diabetes mellitus with long-term use of insulin, gastroesophageal   reflux disease, hypothyroidism, joint replacement, back surgery with failed   back syndrome.     PAST SURGICAL HISTORY:  Includes a hysterectomy, thyroid lobectomy, cervical   disk surgery, tonsillectomy, cervical fusion, abdominal hysterectomy, lumbar   diskectomy, shoulder decompression, clavicle excision, shoulder arthroscopy,   spinal cord stimulator, thoracic  laminectomy, appendectomy, among others.     FAMILY HISTORY:  Positive for cancer in her father, hypertension in her   mother.     SOCIAL HISTORY:  The patient does drink alcohol and has significant   macrocytosis.  She may be smoking.     ALLERGIES:  PRIMARILY TO TAPE.     MEDICATIONS:  At the time of admission were Xanax, vitamins, insulin,   Synthroid, low dose aspirin, Zetia, Pepcid, fentanyl patch, levothyroxine,   Toprol-XL, Zofran, Protonix, Zoloft, Desyrel, Actigall and vitamin D3.     REVIEW OF SYSTEMS:  Primarily positive for right upper quadrant abdominal   pain.  The patient has been jaundiced.  She has not really had chills or   rigors.  Otherwise, negative and noncontributory in 10 categories.     PHYSICAL EXAMINATION:  VITAL SIGNS:  Physically, she is afebrile with normal vital signs.  HEENT:  Remarkable for chronic illness.  The patient does appear to be   jaundiced.  NECK:  Shows evidence of previous thyroid surgery. Previous orthopedic or   neurosurgical interventions with well-healed scars.  LUNGS:  Clear.  CARDIAC:  Normal.  ABDOMEN:  Tender in the right upper quadrant.  There are no peritoneal   findings.  EXTREMITIES:  Show no major deformities.  SKIN:  Jaundiced, but otherwise normal.  NEUROLOGIC:  She is intact.  PSYCHIATRIC:  She appears to be appropriate.     LABORATORY DATA:  The patient's laboratories featured anemia, which at least   in part has macrocytosis.  Platelet count was normal.  Electrolytes were in   order except for sodium of 128 with elevated blood sugar of 289.    Transaminases are mildly elevated.  Her alk phos is markedly elevated.  Her   bilirubin was 9.9.  The patient's troponin at the time of admission was 47.    Imaging is consistent with probable acute cholecystitis.     IMPRESSION AND PLAN:  The patient is felt most likely to have   choledocholithiasis.  The plan was for admission antibiotic therapy, ERCP and   then decide about surgical intervention.  We will await  recommendations by   gastroenterology and then come up with a definitive surgical plan.  Medical   decision making is complex.  There is a high level diagnoses, a high amount of   data and a moderate to high risk for potential surgical intervention.        ______________________________  MD ADRIANA HOOVER/ANDRE    DD:  10/25/2021 09:21  DT:  10/25/2021 10:09    Job#:  [id number]    CC:Dixon Chicas M.D.(User)

## 2021-10-25 NOTE — PROGRESS NOTES
Report received from RN, assumed care at 0700  Pt is A0X4, in distress reporting pain at a 10/10. Pain medication unavailable, given heat packs for relief. Stating she will leave if not given something for the pain. Updated pt on next available pain medication time.  Pt declines any SOB, chest pain, new onset of numbness/ tingiling  Pt is voiding adequatly and without hesitancy  Pt has + flatus, + bowel sounds, + BM PTA, within past 24 hours per pt  Pt ambulates with a x1 assist. Per vandana bueno, pt is high fall risk. Bed alarm placed.    Pt is NPO for possible procedure today   Plan of care discussed, all questions answered. Explained importance of calling before getting OOB and pt verbalizes understanding. Explained importance of oral care. Call light is within reach, treaded slipper socks on, bed in lowest/ locked position, hourly rounding in place, all needs met at this time

## 2021-10-25 NOTE — CONSULTS
Gastroenterology Initial Consult Note               Author:  MARK Harper/Tresa Soto MD Date & Time Created: 10/25/2021 9:14 AM       Patient ID:  Name:             Anu Manuel  YOB: 1955  Age:                 66 y.o.  female  MRN:               [id number]      Referring Provider:  Dixon Chicas MD      Presenting Chief Complaint:  Abdominal pain, Cholestatic liver disease/injury      History of Present Illness:    She is a 66-year-old female patient who is seen in consultation for abdominal pain cholestatic liver disease/injury.  The patient presented to the hospital on 10/24/2021 with worsening abdominal pain.  It is located in the upper abdomen and is described as achy and crampy, sometimes sharp, rated as severe with no aggravating or alleviating factors.  She states the pain is progressive.  She does have some nausea but no vomiting.  Labs and imaging reviewed from this admission which demonstrate CBC significant for WBC 1.6, hemoglobin 9.4, hematocrit 29.8, .7, platelets are normal.  CHEM panel sodium 128, , , alkaline phosphatase 1948, bilirubin 9.9.  Lipase is normal.  Right upper quadrant ultrasound demonstrates gallbladder sludge and cholelithiasis with gallbladder wall thickening and positive sonographic Cifuentes sign.  She is noted to have hepatomegaly and echogenic liver compatible with fatty change versus fibrosis.  Common bile duct measures 3.7 mm.    The patient was just recently seen by our group in the hospital initially by Dr. Hayden Zavala and DANNY Delgado with significantly elevated liver enzymes cholestatic pattern.  She was transferred to [facility] for hepatology evaluation, however patient left AGAINST MEDICAL ADVICE from that facility due to wanting a cigarette.  She was then reseen here on 9/28/2021 by myself and Dr. Nikunj Lenz.  Previous MRCP did not demonstrate high-grade bile duct obstruction  "or choledocholithiasis.  Ultrasound demonstrated cholelithiasis but no evidence of cholecystitis.  Acute and chronic liver work-up did not demonstrate an elevated SCARLETT, but smooth muscle antibody, IgG, IgG4, and antimitochondrial antibody and other liver work-up was essentially normal.  She was started on ursodiol for possible PBC.  Liver biopsy was performed initially with nonspecific findings, but delayed reading by [facility] pathology second opinion stated consistent with large bile duct obstruction.  CT did demonstrate some thickening of areas of the colon, but fecal calprotectin was normal on 9/29/2021.    Additional past medical history does include type 1 diabetes mellitus, hyperlipidemia LAD stent in August 2020, coronary artery disease, tobacco user.      Review of Systems:  Review of Systems   Constitutional: Positive for malaise/fatigue. Negative for chills and fever.   HENT: Negative for sore throat.    Eyes: Negative for pain, discharge and redness.   Respiratory: Negative for cough, wheezing and stridor.    Cardiovascular: Negative for chest pain, palpitations and orthopnea.   Gastrointestinal: Positive for abdominal pain, constipation and nausea. Negative for blood in stool and melena.   Genitourinary: Negative for urgency.   Musculoskeletal: Positive for back pain and joint pain. Negative for myalgias.   Skin: Positive for itching. Negative for rash.   Neurological: Positive for weakness. Negative for dizziness.   Endo/Heme/Allergies: Negative for environmental allergies and polydipsia.   Psychiatric/Behavioral: Negative for memory loss. The patient does not have insomnia.              Past Medical History:  Past Medical History:   Diagnosis Date    Adverse effect of anesthesia     in 2008 \"throat closes up\"\"anxiety\" ?laryngospasm, kept in ICU. Pt states no problems currently 2018.     Anesthesia     in 2008 \"throat closes up\"\"anxiety\" ?laryngospasm, kept in ICU. Pt states no problems currently 2018.  " "   Arthritis     right shoulder, hands    Cigarette smoker quit 2013    Dental disorder     missing teeth     depression 8/30/2016    denies depression, states has anxiety and panic attacks    Diabetes mellitus type 1 (HCC) 1989    insulin    Encounter for long-term (current) use of insulin (HCC) 9/25/2013    Heart burn     High cholesterol     Hypertension     Hypothyroidism, postsurgical 1970    Indigestion     Infectious disease      had hepatitis C, tested neg.    Joint replacement     cervical    Pain     \"fibromyalgia\";lower back, right leg    Polyneuropathy in diabetes(357.2) 6/10/2015    Status post appendectomy     Type I (juvenile type) diabetes mellitus with neurological manifestations, uncontrolled(250.63) 6/10/2015     Active Hospital Problems    Diagnosis     Cholecystitis [K81.9]     Hyperbilirubinemia [E80.6]     Hyponatremia [E87.1]     CAD S/P percutaneous coronary angioplasty [I25.10, Z98.61]     Hypertension [I10]     Uncontrolled type 1 diabetes mellitus (HCC) [E10.65]          Past Surgical History:  Past Surgical History:   Procedure Laterality Date    CATH PLACEMENT  1/25/2020    Procedure: INSERTION, CATHETER PERM;  Surgeon: Rola Mendoza M.D.;  Location: [facility];  Service: General    IRRIGATION & DEBRIDEMENT NEURO  1/19/2020    Procedure: IRRIGATION AND DEBRIDEMENT, WOUND THORACIC AND LUMBAR;  Surgeon: Ryan Roman M.D.;  Location: [facility];  Service: Neurosurgery    PB IMPLANT NEUROSTIM/ N/A 12/16/2019    Procedure: EXPLORATION AT THORACIC 8 - 9, REPLACEMENT OF  NEUROSTIMULATOR LEAD;  Surgeon: Ryan Roman M.D.;  Location: [facility];  Service: Neurosurgery    SPINAL CORD STIMULATOR N/A 10/26/2018    Procedure: SPINAL CORD STIMULATOR;  Surgeon: Ryan Roman M.D.;  Location: [facility];  Service: Neurosurgery    THORACIC LAMINECTOMY N/A 10/26/2018    Procedure: THORACIC LAMINECTOMY - FOR;  Surgeon: " Ryan Roman M.D.;  Location: SURGERY [facility];  Service: Neurosurgery    RI NJX AA&/STRD TFRML EPI LUMBAR/SACRAL 1 LEVEL Right 8/31/2016    Procedure: INJ-FORAMEN EPI LUM/SAC SNGL L4-5;  Surgeon: Sukhi Godfrey M.D.;  Location: SURGERY [facility];  Service: Pain Management    LUMBAR LAMINECTOMY DISKECTOMY Right 5/10/2016    Procedure: LUMBAR L4-5 HEMILAMINECTOMY DISKECTOMY ;  Surgeon: Arnold Keyes M.D.;  Location: SURGERY [facility];  Service:     CERVICAL FUSION POSTERIOR  1/16/2009    Performed by TARA CONTRERAS at [facility]    HARDWARE REMOVAL NEURO  1/16/2009    Performed by TARA CONTRERAS at [facility]    NECK EXPLORATION  1/16/2009    Performed by TARA CONTRERAS at [facility]    SHOULDER ARTHROSCOPY W/ ROTATOR CUFF REPAIR  10/9/08    Performed by SHERLY CASTANEDA at [facility]    SHOULDER DECOMPRESSION ARTHROSCOPIC  6/17/08    Performed by SHERLY CASTANEDA at [facility]    CLAVICLE DISTAL EXCISION  6/17/08    Performed by SHERLY CASTANEDA at [facility] ORS    CERVICAL DISK AND FUSION ANTERIOR  03/12/08    HYSTERECTOMY, VAGINAL  2006    APPENDECTOMY  2004    THYROID LOBECTOMY  1973    TONSILLECTOMY  1963    ABDOMINAL HYSTERECTOMY TOTAL      LUMPECTOMY  1976, 2005    Breast     LUMPECTOMY             [facility] Medications:  Current Facility-Administered Medications   Medication Dose Frequency Provider Last Rate Last Admin    capsaicin (Zostrix) 0.025 % cream   Once Pieter Thomas M.D.        aspirin EC (ECOTRIN) tablet 81 mg  81 mg QAM Andres Ho M.D.        amLODIPine (NORVASC) tablet 5 mg  5 mg Q EVENING Andres Ho M.D.        ezetimibe (ZETIA) tablet 10 mg  10 mg Q EVENING Andres Ho M.D.        metoprolol SR (TOPROL XL) tablet 200 mg  200 mg DAILY Andres Ho M.D.   200 mg at 10/25/21 0533    oxyCODONE immediate release (ROXICODONE) tablet 10 mg  10 mg Q6HRS PRN Andres Ho M.D.    10 mg at 10/25/21 0435    omeprazole (PRILOSEC) capsule 20 mg  20 mg BHUMI Ho M.D.   20 mg at 10/25/21 0534    sertraline (Zoloft) tablet 100 mg  100 mg Q EVENING Andres Ho M.D.        levothyroxine (SYNTHROID) tablet 225 mcg  225 mcg QABAILEY Ho M.D.   225 mcg at 10/25/21 0533    traZODone (DESYREL) tablet 100 mg  100 mg QHS PRN Andres Ho M.D.        ursodiol (ACTIGALL) capsule 300 mg  300 mg DAILY Andres Ho M.D.   300 mg at 10/25/21 0858    senna-docusate (PERICOLACE or SENOKOT S) 8.6-50 MG per tablet 2 Tablet  2 Tablet BID Andres Ho M.D.        And    polyethylene glycol/lytes (MIRALAX) PACKET 1 Packet  1 Packet QDAY PRN Andres Ho M.D.        And    magnesium hydroxide (MILK OF MAGNESIA) suspension 30 mL  30 mL QDAY PRN Andres Ho M.D.        And    bisacodyl (DULCOLAX) suppository 10 mg  10 mg QDAY PRN Andres Ho M.D.        lactated ringers infusion   Continuous Andres Ho M.D.   Stopped at 10/25/21 0407    acetaminophen (Tylenol) tablet 650 mg  650 mg Q6HRS PRN Andres Ho M.D.   650 mg at 10/25/21 0435    HYDROmorphone (Dilaudid) injection 0.5 mg  0.5 mg Q6HRS PRN Andres Ho M.D.   0.5 mg at 10/25/21 0911    enalaprilat (Vasotec) injection 1.25 mg 1 mL  1.25 mg Q6HRS PRN Andres Ho M.D.        labetalol (NORMODYNE/TRANDATE) injection 10 mg  10 mg Q4HRS PRN Andres Ho M.D.        ondansetron (ZOFRAN) syringe/vial injection 4 mg  4 mg Q4HRS PRN Andres Ho M.D.        ondansetron (ZOFRAN ODT) dispertab 4 mg  4 mg Q4HRS PRN Andres Ho M.D.        promethazine (PHENERGAN) tablet 12.5-25 mg  12.5-25 mg Q4HRS PRN Andres Ho M.D.        promethazine (PHENERGAN) suppository 12.5-25 mg  12.5-25 mg Q4HRS PRN Andres Ho M.D.        prochlorperazine (COMPAZINE) injection 5-10 mg  5-10 mg Q4HRS PRN Andres Ho M.D.        piperacillin-tazobactam (ZOSYN) 3.375 g in  mL IVPB   3.375 g Q8HRS Andres Ho M.D. 25 mL/hr at 10/25/21 0610 3.375 g at 10/25/21 0610    insulin glargine (Semglee) injection  0.2 Units/kg/day Q EVENING Andres Ho M.D.        And    insulin lispro (AdmeLOG) injection  1-6 Units Q6HRS Andres Ho M.D.   5 Units at 10/25/21 0414    And    glucose 4 g chewable tablet 16 g  16 g Q15 MIN PRN Andres Ho M.D.        And    dextrose 50% (D50W) injection 50 mL  50 mL Q15 MIN PRN Andres Ho M.D.        diphenhydrAMINE (BENADRYL) tablet/capsule 25 mg  25 mg Q6HRS PRN Andres Ho M.D.       Last reviewed on 10/25/2021  4:22 AM by Payton Craven R.N.        Current Outpatient Medications:  Medications Prior to Admission   Medication Sig Dispense Refill Last Dose    diphenhydrAMINE (BENADRYL ALLERGY) 25 MG capsule Take 1 Capsule by mouth every 6 hours as needed for Itching. 30 Capsule 2 prn at prn    fentaNYL (DURAGESIC) 25 MCG/HR PATCH 72 HR Place 1 Patch on the skin every 72 hours for 15 days. 5 Patch 0 10/24/2021 at Unknown time    ursodiol (ACTIGALL) 300 MG Cap Take 1 Capsule by mouth every day. 30 Capsule 0 10/24/2021 at am    Ascorbic Acid (VITAMIN C) 250 MG Tab Take 250 mg by mouth every morning.   10/24/2021 at am    vitamin D3 (QC VITAMIN D3) 5000 Unit (125 mcg) Tab Take 5,000 Units by mouth 2 times a day.   10/24/2021 at am    pyridoxine (VITAMIN B-6) 50 MG Tab Take 50 mg by mouth every morning.   10/24/2021 at am    famotidine (PEPCID) 20 MG Tab Take 20 mg by mouth 2 times a day.   10/24/2021 at am    insulin aspart (NOVOLOG FLEXPEN) 100 UNIT/ML injection PEN Inject 1-5 Units under the skin in the morning, at noon, and at bedtime. 1 units for every 5 g of carbs   AND  Sliding Scale   150 - 200 = 1 units  201 - 250 = 2 units  251 - 300 = 3 units  301 - 350 = 4 units  351 - 400 = 5 units   10/24/2021 at 1200    levothyroxine (SYNTHROID) 25 MCG Tab Take 25 mcg by mouth every morning on an empty stomach. 25mcg tablet + 200mcg  tablet for a total dose of 225mcg   10/24/2021 at am    B Complex Vitamins (VITAMIN B COMPLEX PO) Take 1 Tablet by mouth every morning.   10/24/2021 at am    Docusate Calcium (STOOL SOFTENER PO) Take 1 Tablet by mouth every morning.   10/24/2021 at am    traZODone (DESYREL) 100 MG Tab Take 100 mg by mouth at bedtime as needed for Sleep.   10/23/2021 at pm    pantoprazole (PROTONIX) 40 MG Tablet Delayed Response Take 40 mg by mouth every morning.   10/24/2021 at am    oxycodone 5 MG capsule Take 5 mg by mouth every four hours as needed for Severe Pain.   10/24/2021 at pm    ondansetron (ZOFRAN ODT) 4 MG TABLET DISPERSIBLE Take 1 Tablet by mouth every 6 hours as needed for Nausea. 10 Tablet 0 prn at prn    ezetimibe (ZETIA) 10 MG Tab Take 10 mg by mouth every evening.   10/23/2021 at pm    TRESIBA FLEXTOUCH 100 UNIT/ML Solution Pen-injector Inject 24 Units under the skin every evening.   10/23/2021 at pm    ALPRAZolam (XANAX) 0.5 MG Tab Take 0.5 mg by mouth at bedtime.   10/23/2021 at hs    sertraline (ZOLOFT) 100 MG Tab Take 100 mg by mouth every evening.   10/23/2021 at pm    amLODIPine (NORVASC) 10 MG Tab Take 5 mg by mouth every evening.   10/23/2021 at pm    SYNTHROID 200 MCG Tab Take 200 mcg by mouth every morning. 200mcg tablet + 25mcg tablet for a total dose of 225mcg   10/24/2021 at am    metoprolol (TOPROL-XL) 200 MG XL tablet Take 1 tablet by mouth every day. 90 tablet 3 10/24/2021 at am    Continuous Blood Gluc Sensor (FREESTYLE PARISA 14 DAY SENSOR) Misc 1 MISCELLANEOUS E10.42 CHANGE SENSOR EVERY 14 DAYS   E11.65   supply at supply    aspirin 81 MG EC tablet Take 1 Tab by mouth every morning. 90 Tab 3 10/24/2021 at am         Medication Allergies:  Allergies   Allergen Reactions    Tape Unspecified     Blisters, paper tape is ok         Family Medical History:  Family History   Problem Relation Age of Onset    Hypertension Mother     Cancer Father          Social History:  Social History     Socioeconomic  "History    Marital status:      Spouse name: Not on file    Number of children: Not on file    Years of education: Not on file    Highest education level: Not on file   Occupational History    Not on file   Tobacco Use    Smoking status: Current Every Day Smoker     Packs/day: 0.50     Years: 30.00     Pack years: 15.00     Types: Cigarettes    Smokeless tobacco: Never Used   Vaping Use    Vaping Use: Never used   Substance and Sexual Activity    Alcohol use: Not Currently    Drug use: Yes     Comment: Marijuana    Sexual activity: Not on file   Other Topics Concern    Not on file   Social History Narrative    Not on file     Social Determinants of Health     Financial Resource Strain:     Difficulty of Paying Living Expenses:    Food Insecurity:     Worried About Running Out of Food in the Last Year:     Ran Out of Food in the Last Year:    Transportation Needs:     Lack of Transportation (Medical):     Lack of Transportation (Non-Medical):    Physical Activity:     Days of Exercise per Week:     Minutes of Exercise per Session:    Stress:     Feeling of Stress :    Social Connections:     Frequency of Communication with Friends and Family:     Frequency of Social Gatherings with Friends and Family:     Attends Bahai Services:     Active Member of Clubs or Organizations:     Attends Club or Organization Meetings:     Marital Status:    Intimate Partner Violence:     Fear of Current or Ex-Partner:     Emotionally Abused:     Physically Abused:     Sexually Abused:          Vital signs:  Weight/BMI: Body mass index is 23.4 kg/m².  /73   Pulse 68   Temp 36.4 °C (97.6 °F) (Temporal)   Resp 17   Ht 1.676 m (5' 6\")   Wt 65.8 kg (145 lb)   SpO2 94%   Vitals:    10/24/21 2154 10/25/21 0337 10/25/21 0512 10/25/21 0659   BP: (!) 163/83 (!) 178/87 148/78 139/73   Pulse:  71 70 68   Resp:  16 16 17   Temp:  36.8 °C (98.2 °F) 37.1 °C (98.8 °F) 36.4 °C (97.6 °F)   TempSrc:  Temporal Temporal Temporal "   SpO2:  94% 95% 94%   Weight:       Height:         Oxygen Therapy:  Pulse Oximetry: 94 %, O2 (LPM): 0, O2 Delivery Device: None - Room Air    Intake/Output Summary (Last 24 hours) at 10/25/2021 0914  Last data filed at 10/25/2021 0912  Gross per 24 hour   Intake 150 ml   Output 0 ml   Net 150 ml         Physical Exam:  Physical Exam  Vitals and nursing note reviewed.   Constitutional:       Appearance: Normal appearance.   HENT:      Head: Normocephalic and atraumatic.      Right Ear: External ear normal.      Left Ear: External ear normal.      Nose: Nose normal.      Mouth/Throat:      Mouth: Mucous membranes are dry.      Pharynx: Oropharynx is clear.   Eyes:      General: Scleral icterus present.      Extraocular Movements: Extraocular movements intact.   Cardiovascular:      Rate and Rhythm: Normal rate and regular rhythm.      Pulses: Normal pulses.      Heart sounds: Normal heart sounds. No murmur heard.     Pulmonary:      Effort: Pulmonary effort is normal. No respiratory distress.      Breath sounds: Normal breath sounds. No wheezing.   Abdominal:      General: Abdomen is flat. Bowel sounds are normal.      Palpations: Abdomen is soft.      Tenderness: There is abdominal tenderness (Epigastric, RUQ).   Musculoskeletal:      Cervical back: Neck supple.      Right lower leg: No edema.      Left lower leg: No edema.   Lymphadenopathy:      Cervical: No cervical adenopathy.   Skin:     General: Skin is warm and dry.      Coloration: Skin is jaundiced.   Neurological:      General: No focal deficit present.      Mental Status: She is alert and oriented to person, place, and time.   Psychiatric:         Mood and Affect: Mood normal.         Thought Content: Thought content normal.         Judgment: Judgment normal.         Labs:  Recent Labs     10/23/21  1930 10/24/21  2256   SODIUM 125* 128*   POTASSIUM 4.6 3.8   CHLORIDE 93* 93*   CO2 23 22   BUN 14 13   CREATININE 0.57 0.81   CALCIUM 8.4 8.8     Recent  Labs     10/23/21  1930 10/24/21  2256   ALTSGPT 93* 110*   ASTSGOT 148* 180*   ALKPHOSPHAT 1968* 1948*   TBILIRUBIN 8.8* 9.9*   LIPASE 11 12   GLUCOSE 263* 289*     Recent Labs     10/23/21  1930 10/24/21  2256   WBC 11.8* 11.6*   NEUTSPOLYS 76.80* 79.90*   LYMPHOCYTES 8.70* 8.00*   MONOCYTES 9.00 8.40   EOSINOPHILS 4.10 2.60   BASOPHILS 0.90 0.60   ASTSGOT 148* 180*   ALTSGPT 93* 110*   ALKPHOSPHAT 1968* 1948*   TBILIRUBIN 8.8* 9.9*     Recent Labs     10/23/21  1930 10/24/21  2256   RBC 2.56* 2.82*   HEMOGLOBIN 8.4* 9.4*   HEMATOCRIT 27.2* 29.8*   PLATELETCT 357 387     Recent Results (from the past 24 hour(s))   CBC WITH DIFFERENTIAL    Collection Time: 10/24/21 10:56 PM   Result Value Ref Range    WBC 11.6 (H) 4.8 - 10.8 K/uL    RBC 2.82 (L) 4.20 - 5.40 M/uL    Hemoglobin 9.4 (L) 12.0 - 16.0 g/dL    Hematocrit 29.8 (L) 37.0 - 47.0 %    .7 (H) 81.4 - 97.8 fL    MCH 33.3 (H) 27.0 - 33.0 pg    MCHC 31.5 (L) 33.6 - 35.0 g/dL    RDW 64.6 (H) 35.9 - 50.0 fL    Platelet Count 387 164 - 446 K/uL    MPV 11.5 9.0 - 12.9 fL    Neutrophils-Polys 79.90 (H) 44.00 - 72.00 %    Lymphocytes 8.00 (L) 22.00 - 41.00 %    Monocytes 8.40 0.00 - 13.40 %    Eosinophils 2.60 0.00 - 6.90 %    Basophils 0.60 0.00 - 1.80 %    Immature Granulocytes 0.50 0.00 - 0.90 %    Nucleated RBC 0.00 /100 WBC    Neutrophils (Absolute) 9.25 (H) 2.00 - 7.15 K/uL    Lymphs (Absolute) 0.93 (L) 1.00 - 4.80 K/uL    Monos (Absolute) 0.97 (H) 0.00 - 0.85 K/uL    Eos (Absolute) 0.30 0.00 - 0.51 K/uL    Baso (Absolute) 0.07 0.00 - 0.12 K/uL    Immature Granulocytes (abs) 0.06 0.00 - 0.11 K/uL    NRBC (Absolute) 0.00 K/uL   COMP METABOLIC PANEL    Collection Time: 10/24/21 10:56 PM   Result Value Ref Range    Sodium 128 (L) 135 - 145 mmol/L    Potassium 3.8 3.6 - 5.5 mmol/L    Chloride 93 (L) 96 - 112 mmol/L    Co2 22 20 - 33 mmol/L    Anion Gap 13.0 7.0 - 16.0    Glucose 289 (H) 65 - 99 mg/dL    Bun 13 8 - 22 mg/dL    Creatinine 0.81 0.50 - 1.40 mg/dL     Calcium 8.8 8.5 - 10.5 mg/dL    AST(SGOT) 180 (H) 12 - 45 U/L    ALT(SGPT) 110 (H) 2 - 50 U/L    Alkaline Phosphatase 1948 (H) 30 - 99 U/L    Total Bilirubin 9.9 (H) 0.1 - 1.5 mg/dL    Albumin 2.9 (L) 3.2 - 4.9 g/dL    Total Protein 6.5 6.0 - 8.2 g/dL    Globulin 3.6 (H) 1.9 - 3.5 g/dL    A-G Ratio 0.8 g/dL   LIPASE    Collection Time: 10/24/21 10:56 PM   Result Value Ref Range    Lipase 12 11 - 82 U/L   ESTIMATED GFR    Collection Time: 10/24/21 10:56 PM   Result Value Ref Range    GFR If African American >60 >60 mL/min/1.73 m 2    GFR If Non African American >60 >60 mL/min/1.73 m 2   URINALYSIS    Collection Time: 10/24/21 11:45 PM    Specimen: Urine, Clean Catch   Result Value Ref Range    Color DK Yellow     Character Clear     Specific Gravity 1.037 <1.035    Ph 5.5 5.0 - 8.0    Glucose 250 (A) Negative mg/dL    Ketones Negative Negative mg/dL    Protein 300 (A) Negative mg/dL    Bilirubin Large (A) Negative    Urobilinogen, Urine 1.0 Negative    Nitrite Negative Negative    Leukocyte Esterase Trace (A) Negative    Occult Blood Small (A) Negative    Micro Urine Req Microscopic    URINE MICROSCOPIC (W/UA)    Collection Time: 10/24/21 11:45 PM   Result Value Ref Range    WBC 0-2 /hpf    RBC 0-2 /hpf    Bacteria Negative None /hpf    Epithelial Cells Moderate (A) /hpf   POCT glucose device results    Collection Time: 10/25/21  4:11 AM   Result Value Ref Range    Glucose - Accu-Ck 354 (H) 65 - 99 mg/dL         Radiology Review:  US-RUQ   Final Result         1.  Gallbladder sludge and cholelithiasis with gallbladder wall thickening and positive sonographic Cifuentes's sign, findings sonographically compatible with cholecystitis.   2.  Hepatomegaly and echogenic liver, compatible with fatty change versus fibrosis.            MDM (Data Review):   -Records reviewed and summarized in current documentation  -I personally reviewed and interpreted the laboratory results  -I personally reviewed the radiology  images        Medical Decision Making, by Problem:  Active Hospital Problems    Diagnosis     Cholecystitis [K81.9]     Hyperbilirubinemia [E80.6]     Hyponatremia [E87.1]     CAD S/P percutaneous coronary angioplasty [I25.10, Z98.61]     Hypertension [I10]     Uncontrolled type 1 diabetes mellitus (HCC) [E10.65]            Assessment/Recommendations:  Assessment:  1.  Upper abdominal pain  2.  Continued elevated liver enzymes primarily cholestatic distribution.  Again her ultrasound does not demonstrate common bile duct dilation with 3.7 mm in size, however her pathology from biopsy is suggestive of large bile duct obstruction.  Given the distribution of her liver enzymes, bilirubin, alkaline phosphatase could potentially be from common bile duct stone versus primary sclerosing cholangitis versus less likely other bile duct obstruction such as scar tissue or malignancy.  3.  Cholelithiasis and cholecystitis-surgery has asked for ERCP  4.  Hyponatremia  5.  Hypertension  6.  Tobacco user    Plan:  1.  Agree with Zosyn for empiric coverage of cholecystitis and potential bile duct obstruction with possible cholangitis    2.  Endoscopic retrograde cholangiopancreatography with biliary sphincterotomy, possible stone extraction, possible dilation, or other intervention with Dr. Cr Haro at 11 Am tomorrow 10/26/2021.     Risks, benefits, and alternatives of aforementioned procedures were discussed with patient and/or family member(s).  Consenting person(s) were given opportunities to ask questions and discuss other options.  Risks including but not limited to perforation, infection, bleeding, missed lesion(s), possible need for surgery(ies) and/or interventional radiology, possible need for repeat procedure(s) and/or additional testing, hospitalization possibly prolonged, cardiac and/or pulmonary event, aspiration, hypoxia, stroke, medication and/or anesthesia reaction, indefinite diagnosis, discomfort, unsuccessful  and/or incomplete procedure, ineffective therapy and/or persistent symptoms, damage to adjacent organs and/or vascular structures, and other adverse events possibly life-threatening.  Interactive discussion was undertaken with Layman's terms. I answered questions in full and to satisfaction.  Consenting person(s) stated understanding and acceptance of these risks, and wished to proceed.  Informed consent was given in clear state of mind.    3.  Repeat CMP, PT/INR tomorrow    4.  Rapid Covid    5.  N.p.o. after midnight    6.  Pain control antiemetics per primary team    WILLIAM Harper.      Thank you for inviting me to participate in the care of this patient. Please do not hesitate to call GI consultants with additional questions/concerns or changes in the patient's clinical status at [phone].    Core Quality Measures   Reviewed items:  Labs, Medications and Radiology reports reviewed    ===  I have seen and examined the patient today.  I have reviewed the medical record, laboratory data, imaging, and all relevant studies.  I have discussed the assessment and plan of care with El DELGADO and agree with their note and plan of care as documented.   Jeni Soto M.D.

## 2021-10-26 ENCOUNTER — ANESTHESIA EVENT (OUTPATIENT)
Dept: SURGERY | Facility: MEDICAL CENTER | Age: 66
DRG: 418 | End: 2021-10-26
Payer: MEDICARE

## 2021-10-26 ENCOUNTER — APPOINTMENT (OUTPATIENT)
Dept: RADIOLOGY | Facility: MEDICAL CENTER | Age: 66
DRG: 418 | End: 2021-10-26
Attending: INTERNAL MEDICINE
Payer: MEDICARE

## 2021-10-26 ENCOUNTER — ANESTHESIA (OUTPATIENT)
Dept: SURGERY | Facility: MEDICAL CENTER | Age: 66
DRG: 418 | End: 2021-10-26
Payer: MEDICARE

## 2021-10-26 PROBLEM — G89.29 CHRONIC PAIN: Status: ACTIVE | Noted: 2021-10-26

## 2021-10-26 PROBLEM — K75.9 HEPATITIS: Status: ACTIVE | Noted: 2021-10-21

## 2021-10-26 LAB
ALBUMIN SERPL BCP-MCNC: 2.4 G/DL (ref 3.2–4.9)
ALBUMIN/GLOB SERPL: 0.7 G/DL
ALP SERPL-CCNC: 1694 U/L (ref 30–99)
ALT SERPL-CCNC: 91 U/L (ref 2–50)
ANION GAP SERPL CALC-SCNC: 11 MMOL/L (ref 7–16)
ANISOCYTOSIS BLD QL SMEAR: ABNORMAL
AST SERPL-CCNC: 137 U/L (ref 12–45)
BASOPHILS # BLD AUTO: 0.9 % (ref 0–1.8)
BASOPHILS # BLD: 0.09 K/UL (ref 0–0.12)
BILIRUB SERPL-MCNC: 8 MG/DL (ref 0.1–1.5)
BUN SERPL-MCNC: 12 MG/DL (ref 8–22)
CALCIUM SERPL-MCNC: 8.4 MG/DL (ref 8.5–10.5)
CHLORIDE SERPL-SCNC: 102 MMOL/L (ref 96–112)
CO2 SERPL-SCNC: 22 MMOL/L (ref 20–33)
CREAT SERPL-MCNC: 0.94 MG/DL (ref 0.5–1.4)
EOSINOPHIL # BLD AUTO: 0.26 K/UL (ref 0–0.51)
EOSINOPHIL NFR BLD: 2.6 % (ref 0–6.9)
ERYTHROCYTE [DISTWIDTH] IN BLOOD BY AUTOMATED COUNT: 62.4 FL (ref 35.9–50)
GLOBULIN SER CALC-MCNC: 3.5 G/DL (ref 1.9–3.5)
GLUCOSE BLD-MCNC: 114 MG/DL (ref 65–99)
GLUCOSE BLD-MCNC: 117 MG/DL (ref 65–99)
GLUCOSE BLD-MCNC: 147 MG/DL (ref 65–99)
GLUCOSE BLD-MCNC: 154 MG/DL (ref 65–99)
GLUCOSE BLD-MCNC: 222 MG/DL (ref 65–99)
GLUCOSE BLD-MCNC: 27 MG/DL (ref 65–99)
GLUCOSE BLD-MCNC: 28 MG/DL (ref 65–99)
GLUCOSE BLD-MCNC: 50 MG/DL (ref 65–99)
GLUCOSE BLD-MCNC: 88 MG/DL (ref 65–99)
GLUCOSE BLD-MCNC: 89 MG/DL (ref 65–99)
GLUCOSE BLD-MCNC: 96 MG/DL (ref 65–99)
GLUCOSE SERPL-MCNC: 41 MG/DL (ref 65–99)
HAPTOGLOB SERPL-MCNC: 233 MG/DL (ref 30–200)
HCT VFR BLD AUTO: 24.5 % (ref 37–47)
HGB BLD-MCNC: 8 G/DL (ref 12–16)
INR PPP: 1.11 (ref 0.87–1.13)
LYMPHOCYTES # BLD AUTO: 1.55 K/UL (ref 1–4.8)
LYMPHOCYTES NFR BLD: 15.5 % (ref 22–41)
MACROCYTES BLD QL SMEAR: ABNORMAL
MANUAL DIFF BLD: NORMAL
MCH RBC QN AUTO: 33.6 PG (ref 27–33)
MCHC RBC AUTO-ENTMCNC: 32.7 G/DL (ref 33.6–35)
MCV RBC AUTO: 102.9 FL (ref 81.4–97.8)
MONOCYTES # BLD AUTO: 1.03 K/UL (ref 0–0.85)
MONOCYTES NFR BLD AUTO: 10.3 % (ref 0–13.4)
MORPHOLOGY BLD-IMP: NORMAL
NEUTROPHILS # BLD AUTO: 7.07 K/UL (ref 2–7.15)
NEUTROPHILS NFR BLD: 70.7 % (ref 44–72)
NRBC # BLD AUTO: 0 K/UL
NRBC BLD-RTO: 0 /100 WBC
PATHOLOGY CONSULT NOTE: NORMAL
PLATELET # BLD AUTO: 324 K/UL (ref 164–446)
PLATELET BLD QL SMEAR: NORMAL
PMV BLD AUTO: 11.5 FL (ref 9–12.9)
POTASSIUM SERPL-SCNC: 3.6 MMOL/L (ref 3.6–5.5)
PROT SERPL-MCNC: 5.9 G/DL (ref 6–8.2)
PROTHROMBIN TIME: 13.9 SEC (ref 12–14.6)
RBC # BLD AUTO: 2.38 M/UL (ref 4.2–5.4)
RBC BLD AUTO: PRESENT
SODIUM SERPL-SCNC: 135 MMOL/L (ref 135–145)
WBC # BLD AUTO: 10 K/UL (ref 4.8–10.8)

## 2021-10-26 PROCEDURE — 80053 COMPREHEN METABOLIC PANEL: CPT

## 2021-10-26 PROCEDURE — 160203 HCHG ENDO MINUTES - 1ST 30 MINS LEVEL 4: Performed by: INTERNAL MEDICINE

## 2021-10-26 PROCEDURE — 770006 HCHG ROOM/CARE - MED/SURG/GYN SEMI*

## 2021-10-26 PROCEDURE — A9270 NON-COVERED ITEM OR SERVICE: HCPCS | Performed by: STUDENT IN AN ORGANIZED HEALTH CARE EDUCATION/TRAINING PROGRAM

## 2021-10-26 PROCEDURE — 503093 HCHG BRUSH, CYTOLOGY: Performed by: INTERNAL MEDICINE

## 2021-10-26 PROCEDURE — 110371 HCHG SHELL REV 272: Performed by: INTERNAL MEDICINE

## 2021-10-26 PROCEDURE — 85027 COMPLETE CBC AUTOMATED: CPT

## 2021-10-26 PROCEDURE — 160002 HCHG RECOVERY MINUTES (STAT): Performed by: INTERNAL MEDICINE

## 2021-10-26 PROCEDURE — 700102 HCHG RX REV CODE 250 W/ 637 OVERRIDE(OP): Performed by: ANESTHESIOLOGY

## 2021-10-26 PROCEDURE — 700117 HCHG RX CONTRAST REV CODE 255: Performed by: INTERNAL MEDICINE

## 2021-10-26 PROCEDURE — 700102 HCHG RX REV CODE 250 W/ 637 OVERRIDE(OP)

## 2021-10-26 PROCEDURE — 160208 HCHG ENDO MINUTES - EA ADDL 1 MIN LEVEL 4: Performed by: INTERNAL MEDICINE

## 2021-10-26 PROCEDURE — 74328 X-RAY BILE DUCT ENDOSCOPY: CPT

## 2021-10-26 PROCEDURE — 99233 SBSQ HOSP IP/OBS HIGH 50: CPT | Performed by: FAMILY MEDICINE

## 2021-10-26 PROCEDURE — 0DJ08ZZ INSPECTION OF UPPER INTESTINAL TRACT, VIA NATURAL OR ARTIFICIAL OPENING ENDOSCOPIC: ICD-10-PCS | Performed by: INTERNAL MEDICINE

## 2021-10-26 PROCEDURE — 700111 HCHG RX REV CODE 636 W/ 250 OVERRIDE (IP): Performed by: ANESTHESIOLOGY

## 2021-10-26 PROCEDURE — 99232 SBSQ HOSP IP/OBS MODERATE 35: CPT | Performed by: SURGERY

## 2021-10-26 PROCEDURE — 700101 HCHG RX REV CODE 250: Performed by: STUDENT IN AN ORGANIZED HEALTH CARE EDUCATION/TRAINING PROGRAM

## 2021-10-26 PROCEDURE — 160035 HCHG PACU - 1ST 60 MINS PHASE I: Performed by: INTERNAL MEDICINE

## 2021-10-26 PROCEDURE — C2617 STENT, NON-COR, TEM W/O DEL: HCPCS | Performed by: INTERNAL MEDICINE

## 2021-10-26 PROCEDURE — 88312 SPECIAL STAINS GROUP 1: CPT

## 2021-10-26 PROCEDURE — 700102 HCHG RX REV CODE 250 W/ 637 OVERRIDE(OP): Performed by: FAMILY MEDICINE

## 2021-10-26 PROCEDURE — 88104 CYTOPATH FL NONGYN SMEARS: CPT

## 2021-10-26 PROCEDURE — 36415 COLL VENOUS BLD VENIPUNCTURE: CPT

## 2021-10-26 PROCEDURE — 85007 BL SMEAR W/DIFF WBC COUNT: CPT

## 2021-10-26 PROCEDURE — A9270 NON-COVERED ITEM OR SERVICE: HCPCS | Performed by: ANESTHESIOLOGY

## 2021-10-26 PROCEDURE — 700102 HCHG RX REV CODE 250 W/ 637 OVERRIDE(OP): Performed by: STUDENT IN AN ORGANIZED HEALTH CARE EDUCATION/TRAINING PROGRAM

## 2021-10-26 PROCEDURE — 0F798DZ DILATION OF COMMON BILE DUCT WITH INTRALUMINAL DEVICE, VIA NATURAL OR ARTIFICIAL OPENING ENDOSCOPIC: ICD-10-PCS | Performed by: INTERNAL MEDICINE

## 2021-10-26 PROCEDURE — A9270 NON-COVERED ITEM OR SERVICE: HCPCS | Performed by: FAMILY MEDICINE

## 2021-10-26 PROCEDURE — 160009 HCHG ANES TIME/MIN: Performed by: INTERNAL MEDICINE

## 2021-10-26 PROCEDURE — 700105 HCHG RX REV CODE 258: Performed by: FAMILY MEDICINE

## 2021-10-26 PROCEDURE — 88305 TISSUE EXAM BY PATHOLOGIST: CPT

## 2021-10-26 PROCEDURE — 0FD98ZX EXTRACTION OF COMMON BILE DUCT, VIA NATURAL OR ARTIFICIAL OPENING ENDOSCOPIC, DIAGNOSTIC: ICD-10-PCS | Performed by: INTERNAL MEDICINE

## 2021-10-26 PROCEDURE — 700111 HCHG RX REV CODE 636 W/ 250 OVERRIDE (IP): Performed by: STUDENT IN AN ORGANIZED HEALTH CARE EDUCATION/TRAINING PROGRAM

## 2021-10-26 PROCEDURE — 160048 HCHG OR STATISTICAL LEVEL 1-5: Performed by: INTERNAL MEDICINE

## 2021-10-26 PROCEDURE — 85610 PROTHROMBIN TIME: CPT

## 2021-10-26 PROCEDURE — 82962 GLUCOSE BLOOD TEST: CPT | Mod: 91

## 2021-10-26 PROCEDURE — 700105 HCHG RX REV CODE 258: Performed by: STUDENT IN AN ORGANIZED HEALTH CARE EDUCATION/TRAINING PROGRAM

## 2021-10-26 DEVICE — ADVANIX BILIARY CENTER BEND 10X5: Type: IMPLANTABLE DEVICE | Site: ESOPHAGUS | Status: FUNCTIONAL

## 2021-10-26 RX ORDER — DEXAMETHASONE SODIUM PHOSPHATE 4 MG/ML
INJECTION, SOLUTION INTRA-ARTICULAR; INTRALESIONAL; INTRAMUSCULAR; INTRAVENOUS; SOFT TISSUE PRN
Status: DISCONTINUED | OUTPATIENT
Start: 2021-10-26 | End: 2021-10-26 | Stop reason: SURG

## 2021-10-26 RX ORDER — DEXTROSE AND SODIUM CHLORIDE 5; .9 G/100ML; G/100ML
INJECTION, SOLUTION INTRAVENOUS CONTINUOUS
Status: DISCONTINUED | OUTPATIENT
Start: 2021-10-26 | End: 2021-10-26

## 2021-10-26 RX ORDER — DEXTROSE MONOHYDRATE 25 G/50ML
50 INJECTION, SOLUTION INTRAVENOUS
Status: DISCONTINUED | OUTPATIENT
Start: 2021-10-26 | End: 2021-10-26 | Stop reason: HOSPADM

## 2021-10-26 RX ORDER — LABETALOL HYDROCHLORIDE 5 MG/ML
5 INJECTION, SOLUTION INTRAVENOUS
Status: DISCONTINUED | OUTPATIENT
Start: 2021-10-26 | End: 2021-10-26 | Stop reason: HOSPADM

## 2021-10-26 RX ORDER — SUCCINYLCHOLINE CHLORIDE 20 MG/ML
INJECTION INTRAMUSCULAR; INTRAVENOUS PRN
Status: DISCONTINUED | OUTPATIENT
Start: 2021-10-26 | End: 2021-10-26 | Stop reason: SURG

## 2021-10-26 RX ORDER — HALOPERIDOL 5 MG/ML
1 INJECTION INTRAMUSCULAR
Status: DISCONTINUED | OUTPATIENT
Start: 2021-10-26 | End: 2021-10-26 | Stop reason: HOSPADM

## 2021-10-26 RX ORDER — ONDANSETRON 2 MG/ML
INJECTION INTRAMUSCULAR; INTRAVENOUS PRN
Status: DISCONTINUED | OUTPATIENT
Start: 2021-10-26 | End: 2021-10-26 | Stop reason: SURG

## 2021-10-26 RX ORDER — MIDAZOLAM HYDROCHLORIDE 1 MG/ML
2 INJECTION INTRAMUSCULAR; INTRAVENOUS
Status: COMPLETED | OUTPATIENT
Start: 2021-10-26 | End: 2021-10-26

## 2021-10-26 RX ORDER — SODIUM CHLORIDE, SODIUM LACTATE, POTASSIUM CHLORIDE, CALCIUM CHLORIDE 600; 310; 30; 20 MG/100ML; MG/100ML; MG/100ML; MG/100ML
INJECTION, SOLUTION INTRAVENOUS CONTINUOUS
Status: DISCONTINUED | OUTPATIENT
Start: 2021-10-26 | End: 2021-10-26 | Stop reason: HOSPADM

## 2021-10-26 RX ORDER — DIPHENHYDRAMINE HYDROCHLORIDE 50 MG/ML
12.5 INJECTION INTRAMUSCULAR; INTRAVENOUS
Status: DISCONTINUED | OUTPATIENT
Start: 2021-10-26 | End: 2021-10-26 | Stop reason: HOSPADM

## 2021-10-26 RX ORDER — HYDRALAZINE HYDROCHLORIDE 20 MG/ML
5 INJECTION INTRAMUSCULAR; INTRAVENOUS
Status: DISCONTINUED | OUTPATIENT
Start: 2021-10-26 | End: 2021-10-26 | Stop reason: HOSPADM

## 2021-10-26 RX ORDER — ALPRAZOLAM 0.25 MG/1
0.25 TABLET ORAL 2 TIMES DAILY PRN
Status: DISCONTINUED | OUTPATIENT
Start: 2021-10-26 | End: 2021-11-03 | Stop reason: HOSPADM

## 2021-10-26 RX ORDER — MEPERIDINE HYDROCHLORIDE 25 MG/ML
5 INJECTION INTRAMUSCULAR; INTRAVENOUS; SUBCUTANEOUS
Status: DISCONTINUED | OUTPATIENT
Start: 2021-10-26 | End: 2021-10-26 | Stop reason: HOSPADM

## 2021-10-26 RX ORDER — ONDANSETRON 2 MG/ML
4 INJECTION INTRAMUSCULAR; INTRAVENOUS
Status: DISCONTINUED | OUTPATIENT
Start: 2021-10-26 | End: 2021-10-26 | Stop reason: HOSPADM

## 2021-10-26 RX ADMIN — AMLODIPINE BESYLATE 5 MG: 10 TABLET ORAL at 18:00

## 2021-10-26 RX ADMIN — PROPOFOL 150 MG: 10 INJECTION, EMULSION INTRAVENOUS at 10:51

## 2021-10-26 RX ADMIN — HYDROMORPHONE HYDROCHLORIDE 0.5 MG: 1 INJECTION, SOLUTION INTRAMUSCULAR; INTRAVENOUS; SUBCUTANEOUS at 08:54

## 2021-10-26 RX ADMIN — URSODIOL 300 MG: 300 CAPSULE ORAL at 06:05

## 2021-10-26 RX ADMIN — OMEPRAZOLE 20 MG: 20 CAPSULE, DELAYED RELEASE ORAL at 05:06

## 2021-10-26 RX ADMIN — DEXTROSE AND SODIUM CHLORIDE: 5; 900 INJECTION, SOLUTION INTRAVENOUS at 07:54

## 2021-10-26 RX ADMIN — MIDAZOLAM HYDROCHLORIDE 2 MG: 1 INJECTION, SOLUTION INTRAMUSCULAR; INTRAVENOUS at 10:38

## 2021-10-26 RX ADMIN — OXYCODONE HYDROCHLORIDE 10 MG: 10 TABLET ORAL at 05:14

## 2021-10-26 RX ADMIN — FENTANYL CITRATE 100 MCG: 50 INJECTION, SOLUTION INTRAMUSCULAR; INTRAVENOUS at 10:51

## 2021-10-26 RX ADMIN — DEXTROSE MONOHYDRATE 50 ML: 25 INJECTION, SOLUTION INTRAVENOUS at 04:09

## 2021-10-26 RX ADMIN — PIPERACILLIN SODIUM AND TAZOBACTAM SODIUM 3.38 G: 3; .375 INJECTION, POWDER, LYOPHILIZED, FOR SOLUTION INTRAVENOUS at 19:42

## 2021-10-26 RX ADMIN — OXYCODONE HYDROCHLORIDE 10 MG: 10 TABLET ORAL at 19:42

## 2021-10-26 RX ADMIN — DEXTROSE MONOHYDRATE 50 ML: 25 INJECTION, SOLUTION INTRAVENOUS at 07:28

## 2021-10-26 RX ADMIN — DEXAMETHASONE SODIUM PHOSPHATE 4 MG: 4 INJECTION, SOLUTION INTRA-ARTICULAR; INTRALESIONAL; INTRAMUSCULAR; INTRAVENOUS; SOFT TISSUE at 10:52

## 2021-10-26 RX ADMIN — INSULIN HUMAN 2 UNITS: 100 INJECTION, SOLUTION PARENTERAL at 11:53

## 2021-10-26 RX ADMIN — HYDROMORPHONE HYDROCHLORIDE 0.5 MG: 1 INJECTION, SOLUTION INTRAMUSCULAR; INTRAVENOUS; SUBCUTANEOUS at 22:07

## 2021-10-26 RX ADMIN — PIPERACILLIN SODIUM AND TAZOBACTAM SODIUM 3.38 G: 3; .375 INJECTION, POWDER, LYOPHILIZED, FOR SOLUTION INTRAVENOUS at 05:06

## 2021-10-26 RX ADMIN — DEXTROSE MONOHYDRATE 50 ML: 25 INJECTION, SOLUTION INTRAVENOUS at 09:30

## 2021-10-26 RX ADMIN — FENTANYL CITRATE 25 MCG: 50 INJECTION, SOLUTION INTRAMUSCULAR; INTRAVENOUS at 12:11

## 2021-10-26 RX ADMIN — HYDROMORPHONE HYDROCHLORIDE 0.5 MG: 1 INJECTION, SOLUTION INTRAMUSCULAR; INTRAVENOUS; SUBCUTANEOUS at 15:34

## 2021-10-26 RX ADMIN — METOPROLOL SUCCINATE 200 MG: 100 TABLET, EXTENDED RELEASE ORAL at 05:06

## 2021-10-26 RX ADMIN — ONDANSETRON 4 MG: 2 INJECTION INTRAMUSCULAR; INTRAVENOUS at 10:59

## 2021-10-26 RX ADMIN — EZETIMIBE 10 MG: 10 TABLET ORAL at 16:40

## 2021-10-26 RX ADMIN — SERTRALINE HYDROCHLORIDE 100 MG: 100 TABLET, FILM COATED ORAL at 16:40

## 2021-10-26 RX ADMIN — LEVOTHYROXINE SODIUM 225 MCG: 0.12 TABLET ORAL at 05:06

## 2021-10-26 RX ADMIN — OXYCODONE HYDROCHLORIDE 10 MG: 10 TABLET ORAL at 13:33

## 2021-10-26 RX ADMIN — ALPRAZOLAM 0.25 MG: 0.25 TABLET ORAL at 13:33

## 2021-10-26 RX ADMIN — SUCCINYLCHOLINE CHLORIDE 100 MG: 20 INJECTION, SOLUTION INTRAMUSCULAR; INTRAVENOUS; PARENTERAL at 10:52

## 2021-10-26 ASSESSMENT — ENCOUNTER SYMPTOMS
NERVOUS/ANXIOUS: 1
WEAKNESS: 1
BLURRED VISION: 0
DIZZINESS: 0
SHORTNESS OF BREATH: 0
ABDOMINAL PAIN: 1
MYALGIAS: 0
NAUSEA: 0
DIARRHEA: 0
HEADACHES: 0
FEVER: 0
FOCAL WEAKNESS: 0
COUGH: 0
PALPITATIONS: 0
BACK PAIN: 0
NAUSEA: 1
CHILLS: 0
SPEECH CHANGE: 0
SORE THROAT: 0
WHEEZING: 0
DIAPHORESIS: 0
VOMITING: 0
SENSORY CHANGE: 0
FLANK PAIN: 0
HEARTBURN: 0
NECK PAIN: 0

## 2021-10-26 ASSESSMENT — PAIN DESCRIPTION - PAIN TYPE
TYPE: ACUTE PAIN
TYPE: ACUTE PAIN;SURGICAL PAIN
TYPE: ACUTE PAIN
TYPE: ACUTE PAIN

## 2021-10-26 ASSESSMENT — PAIN SCALES - GENERAL: PAIN_LEVEL: 0

## 2021-10-26 NOTE — ANESTHESIA POSTPROCEDURE EVALUATION
Patient: Anu Manuel    Procedure Summary     Date: 10/26/21 Room / Location: [facility] ROOM 26 / SURGERY SAME DAY [facility]    Anesthesia Start: 1047 Anesthesia Stop: 1128    Procedures:       ERCP (ENDOSCOPIC RETROGRADE CHOLANGIOPANCREATOGRAPHY) (N/A Esophagus)      GASTROSCOPY, WITH BIOPSY (N/A Esophagus)      GASTROSCOPY (N/A Esophagus) Diagnosis: (CHOLELITHIASIS)    Surgeons: rC Haro M.D. Responsible Provider: Flavio Early M.D.    Anesthesia Type: general ASA Status: 3          Final Anesthesia Type: general  Last vitals  BP   Blood Pressure: 120/63    Temp   36.5 °C (97.7 °F)    Pulse   60   Resp   18    SpO2   99 %      Anesthesia Post Evaluation    Patient location during evaluation: PACU  Patient participation: complete - patient participated  Level of consciousness: awake  Pain score: 0    Airway patency: patent  Anesthetic complications: no  Cardiovascular status: hemodynamically stable  Respiratory status: acceptable and face mask  Hydration status: euvolemic    PONV: none          No complications documented.     Nurse Pain Score: 6 (NPRS)

## 2021-10-26 NOTE — PROGRESS NOTES
[facility] Medicine Daily Progress Note    Date of Service  10/26/2021    Chief Complaint  Anu Manuel is a 66 y.o. female admitted 10/24/2021 with cholecystitis.    Hospital Course  Admitted with cholecystitis, suspected choledocholithiasis. Gastroenterology and surgery were both consulted on the case. Patient was started empiric coverage with IV Zosyn.  Patient with history of hepatitis, with unclear etiology. Previous work-up for autoimmune hepatitis was negative. She had a liver biopsy done which did show features of large bile duct obstruction. 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    Interval Problem Update  Cholecystitis - for ERCP  Hepatitis - case discussed with Gastroenterology  HTN - sbp 110-150  Diabetes - BS     I have personally seen and examined the patient at bedside. I discussed the plan of care with patient, bedside RN, charge RN,  and GI.    Consultants/Specialty  general surgery and GI    Code Status  DNAR/DNI    Disposition  Patient is not medically cleared.   Anticipate discharge to to home with close outpatient follow-up.  I have placed the appropriate orders for post-discharge needs.    Review of Systems  Review of Systems   Constitutional: Positive for malaise/fatigue. Negative for chills, diaphoresis and fever.   HENT: Negative for congestion, hearing loss and sore throat.    Eyes: Negative for blurred vision.   Respiratory: Negative for cough, shortness of breath and wheezing.    Cardiovascular: Negative for chest pain, palpitations and leg swelling.   Gastrointestinal: Positive for abdominal pain and nausea. Negative for diarrhea, heartburn and vomiting.   Genitourinary: Negative for dysuria, flank pain and hematuria.   Musculoskeletal: Negative for back pain, joint pain, myalgias and neck pain.   Skin: Negative for rash.   Neurological: Positive for weakness.  Negative for dizziness, sensory change, speech change, focal weakness and headaches.   Psychiatric/Behavioral: The patient is nervous/anxious.         Physical Exam  Temp:  [36.5 °C (97.7 °F)-36.8 °C (98.2 °F)] 36.5 °C (97.7 °F)  Pulse:  [54-69] 56  Resp:  [16-18] 18  BP: (115-155)/(58-82) 140/76  SpO2:  [91 %-100 %] 91 %    Physical Exam  Vitals and nursing note reviewed.   HENT:      Head: Normocephalic and atraumatic.      Nose: No congestion.      Mouth/Throat:      Mouth: Mucous membranes are moist.   Eyes:      General: Scleral icterus present.      Extraocular Movements: Extraocular movements intact.      Conjunctiva/sclera: Conjunctivae normal.   Cardiovascular:      Rate and Rhythm: Normal rate and regular rhythm.   Pulmonary:      Effort: Pulmonary effort is normal.      Breath sounds: Normal breath sounds.   Abdominal:      General: There is no distension.      Tenderness: There is abdominal tenderness (RUQ). There is no guarding or rebound.   Musculoskeletal:      Cervical back: No tenderness.      Right lower leg: No edema.      Left lower leg: No edema.   Skin:     Coloration: Skin is jaundiced.   Neurological:      General: No focal deficit present.      Mental Status: She is alert and oriented to person, place, and time.      Cranial Nerves: No cranial nerve deficit.   Psychiatric:         Mood and Affect: Mood is anxious. Affect is tearful.         Fluids    Intake/Output Summary (Last 24 hours) at 10/26/2021 1304  Last data filed at 10/26/2021 1123  Gross per 24 hour   Intake 820 ml   Output 1 ml   Net 819 ml       Laboratory  Recent Labs     10/23/21  1930 10/24/21  2256 10/26/21  0308   WBC 11.8* 11.6* 10.0   RBC 2.56* 2.82* 2.38*   HEMOGLOBIN 8.4* 9.4* 8.0*   HEMATOCRIT 27.2* 29.8* 24.5*   .3* 105.7* 102.9*   MCH 32.8 33.3* 33.6*   MCHC 30.9* 31.5* 32.7*   RDW 63.7* 64.6* 62.4*   PLATELETCT 357 387 324   MPV 11.6 11.5 11.5     Recent Labs     10/23/21  1930 10/24/21  1719 10/26/21  5646    SODIUM 125* 128* 135   POTASSIUM 4.6 3.8 3.6   CHLORIDE 93* 93* 102   CO2 23 22 22   GLUCOSE 263* 289* 41*   BUN 14 13 12   CREATININE 0.57 0.81 0.94   CALCIUM 8.4 8.8 8.4*     Recent Labs     10/26/21  0308   INR 1.11               Imaging  US-RUQ   Final Result         1.  Gallbladder sludge and cholelithiasis with gallbladder wall thickening and positive sonographic Cifuentes's sign, findings sonographically compatible with cholecystitis.   2.  Hepatomegaly and echogenic liver, compatible with fatty change versus fibrosis.      [id number] DUCTS    (Results Pending)        Assessment/Plan  * Cholecystitis- (present on admission)  Assessment & Plan  IV Zosyn  For ERCP  Pain control    Hepatitis- (present on admission)  Assessment & Plan  Unclear etiology  Ursodiol  Gastroenterology following    Chronic pain- (present on admission)  Assessment & Plan  Pain control    Hyponatremia- (present on admission)  Assessment & Plan  Follow cmp    Anemia- (present on admission)  Assessment & Plan  Follow cbc    CAD S/P percutaneous coronary angioplasty- (present on admission)  Assessment & Plan  Aspirin, Zetia, Metoprolol.      Anxiety- (present on admission)  Assessment & Plan  Zoloft, Xanax    Hypertension- (present on admission)  Assessment & Plan  Amlodipine, Metoprolol    Uncontrolled type 1 diabetes mellitus (HCC)- (present on admission)  Assessment & Plan  Hold Semglee and SSI  Accuchecks  IV D50 prn for BS < 100  Start IVF D5         VTE prophylaxis: SCDs/TEDs    I have performed a physical exam and reviewed and updated ROS and Plan today (10/26/2021). In review of yesterday's note (10/25/2021), there are no changes except as documented above.

## 2021-10-26 NOTE — PROCEDURES
Esophagogastroduodenoscopy/ Endoscopic Retrograde Cholangiopancreatography    Date of Procedure:  10/26/2021  Attending Physician:  Cr Haro MD  Indications: Elevated LFT, possible cholangitis/choledocholithiasis; abnormal liver biopsy      Instrument: Olympus Flexible Sideviewing Endoscope  Sedation:   Anesthesiologist/Type of Anesthesia:  Anesthesiologist: Flavio Early M.D./General    Surgical Staff:  Endoscopy Technician: Marianna Casiano  Radiology Technologist: Dixon Brown  Endoscopy Nurse: John Mccullough R.N.; Hamzah Reardon RKADI    Specimens removed if any:  ID Type Source Tests Collected by Time Destination   A :  Tissue Gastric PATHOLOGY SPECIMEN Cr Haro M.D. 10/26/2021 11:01 AM    B : CBD Brushing Tissue Common Bile Duct PATHOLOGY SPECIMEN Cr Haro M.D. 10/26/2021 11:13 AM        Pre-Anesthesia Assessment:  Prior to the procedure, a History and Physical was performed, and patient medications and allergies were reviewed. The patient’s tolerance of previous anesthesia was also reviewed. The risks and benefits of the procedure and the sedation options and risks were discussed with the patient including but not limited to infection, bleeding, aspiration, perforation, adverse medication reaction, missed diagnosis, missed lesions, and pancreatitis. The patient verbalized understanding. All questions were answered, and informed consent was obtained      After I obtained informed consent from the patient, the patient was placed in the prone/swimmer position. Appropriate time-out protocol was followed: the correct patient, the correct procedure, and the correct equipment in the room were confirmed. Throughout the procedure, the patient’s blood pressure, pulse, and oxygen saturations were monitored continuously. The Olymus flexible endoscope and then sideviewing duodenoscope were inserted through the oropharynx, esophagus intubated, then advanced to the gastrointestinal tract to the major  papilla. The duct(s) were cannulated and contrast was injected I personally interpreted the ductal images.  Findings and interventions were performed and documented below. Air was then withdrawn and the duodenoscope was removed. The patient tolerated the procedure well. There were no immediate postoperative complications    Findings:  EGD:   Esophagus: Normal esophagus.  GE junction at 40 cm from incisor  Stomach J-shaped stomach but no gross abnormalities.  Retroflexion appeared normal.  Biopsies taken to rule out H. pylori  Duodenum: first and second portion duodenum appeared normal.  Papilla appeared normal    ERCP:    film was normal.  Scope was inserted second portion duodenum without difficulty.  Ampulla appeared normal.  There was bile staining the mucosa of the duodenum.  Cannulation with a 0.025 wire and sphincterotome was pure.  Throughout entire procedure pancreatic duct was never cannulated or injected.  Wire was advanced into the expected course of the left intrahepatic duct.  Test cholangiogram demonstrated appropriate anatomy.  A traction biliary sphincterotomy was performed without excessive bleeding.  Utilizing a 9-12 mm extractor balloon multiple balloon sweeps were performed without any stones extracted.  No significant sludge extracted.  Stepwise occlusion cholangiogram demonstrated no filling defect no stricture.  Due to previous biopsy concerning for large bile duct disease process biliary brushing was also performed in the distal common bile duct for cytology.  Again no stricture or narrowing was seen.  Due to patient's elevated LFTs a 10 Cymro by 5 cm plastic biliary stent was then placed in the usual fashion.  Bilateral films under diaphragm was negative for free air.      Please note that cholangiogram demonstrated filling of the cystic duct as well as the gallbladder with suspected cholelithiasis.    Impressions:  1.  Normal-appearing bile duct status post ERCP sphincterotomy balloon  sweep cytology brushing and placement of a prophylactic 10 Swedish by 5 cm plastic biliary stent for biliary decompression  2.  Gastric biopsies taken  3.  J-shaped stomach    Recommendations:   1.  Monitor for postprocedure complication including perforation bleeding pancreatitis  2.  Await biopsy and brushing results  3.  Trend LFTs  4 . Will need ERCP stent removal in 3 months.  Earlier if stent occlusion  5.  Timing of cholecystectomy as per surgery      NOTE: Radiologic interpretation of dynamic and static fluoroscopic imaging by myself.  At no time was/were a Radiologist present.     This note was generated using voice recognition software which has a small chance of producing errors of grammar and possibly content. I have made every reasonable attempt to find and correct any obvious errors, but expect that some may not be found prior to finalization of this note

## 2021-10-26 NOTE — PROGRESS NOTES
This RN received called from lab stating patient's blood glucose was 41. This RN rechecked patient's fingerstick blood glucose and result was 28. Per order, 50 mL of D50W was given to the patient. 15 minute follow-up fsbs was 96.

## 2021-10-26 NOTE — ANESTHESIA PROCEDURE NOTES
Airway    Date/Time: 10/26/2021 10:52 AM  Performed by: Flavio Early M.D.  Authorized by: Flavio Early M.D.     Location:  OR  Urgency:  Elective  Difficult Airway: No    Indications for Airway Management:  Anesthesia      Spontaneous Ventilation: absent    Sedation Level:  Deep  Preoxygenated: Yes    Patient Position:  Sniffing  Mask Difficulty Assessment:  1 - vent by mask  Final Airway Type:  Endotracheal airway  Final Endotracheal Airway:  ETT  Cuffed: Yes    Technique Used for Successful ETT Placement:  Direct laryngoscopy  Devices/Methods Used in Placement:  Intubating stylet    Insertion Site:  Oral  Blade Type:  Howard  Laryngoscope Blade/Videolaryngoscope Blade Size:  2  ETT Size (mm):  7.0  Measured from:  Teeth  ETT to Teeth (cm):  21  Placement Verified by: auscultation and capnometry    Cormack-Lehane Classification:  Grade I - full view of glottis  Number of Attempts at Approach:  1

## 2021-10-26 NOTE — ANESTHESIA PREPROCEDURE EVALUATION
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, uncontrolled (HCC)   (positive) Uncontrolled type 1 diabetes mellitus (HCC)       Physical Exam    Airway   Mallampati: II  TM distance: >3 FB  Neck ROM: full       Cardiovascular - normal exam  Rhythm: regular  Rate: normal  (-) murmur     Dental   (+) upper dentures           Pulmonary - normal exam  Breath sounds clear to auscultation     Abdominal    Neurological - normal exam                 Anesthesia Plan    ASA 3   ASA physical status 3 criteria: diabetes - poorly controlled and CAD/stents (> 3 months)    Plan - general       Airway plan will be ETT          Induction: intravenous    Postoperative Plan: Postoperative administration of opioids is intended.    Pertinent diagnostic labs and testing reviewed    Informed Consent:    Anesthetic plan and risks discussed with patient.

## 2021-10-26 NOTE — OR NURSING
1126 Patient arrived to PACU from OR. Report received. Patient attached to monitoring. VSS. Patient oxygenating well on 4 L via mask.     1143 FSBS-154, 2 units of insulin given per MAR    1158 Report given to Bri MORSE.     1211 Patient complaining of 5/10 pain in abdomen. Medication given per MAR.    1225 Patient transferred to room T414-02 via [place] by patients transport.

## 2021-10-26 NOTE — PROGRESS NOTES
Pt fsbg 27. D50 syringe administered. Hospitalist paged and updated. Orders received    0808: Re check of fsbg 89  0915: recheck of fsbg 50. d50 given  0946: recheck of fsbg 147

## 2021-10-26 NOTE — PROGRESS NOTES
Hospital Medicine Daily Progress Note    Date of Service  10/25/2021    Chief Complaint  Anu Manuel is a 66 y.o. female admitted 10/24/2021 with very complicated past medical history including Type 1 DM and progressive liver disease of unknown etiology with recent visits to [facility] and [facility] who presents to our hospital with acute on chronic abdominal pain with imaging c/f for possible acute cholecystitis along with choledocholithiasis and ascending cholangitis.     Hospital Course  No notes on file    Interval Problem Update  AB pain-quite severe, primarily located over the stomach area and radiates directly to her back. She is tired of feeling this way some mild associated nausea.    I have personally seen and examined the patient at bedside. I discussed the plan of care with patient.    Consultants/Specialty  general surgery and GI    Code Status  DNAR/DNI    Disposition  Patient is not medically cleared.   Anticipate discharge to to home with close outpatient follow-up.  I have placed the appropriate orders for post-discharge needs.    Review of Systems  Review of Systems   Constitutional: Negative for chills and fever.   Respiratory: Negative for cough and shortness of breath.    Gastrointestinal: Positive for abdominal pain and nausea. Negative for vomiting.   Psychiatric/Behavioral: The patient is nervous/anxious.    All other systems reviewed and are negative.       Physical Exam  Temp:  [36.4 °C (97.6 °F)-37.1 °C (98.8 °F)] 36.6 °C (97.8 °F)  Pulse:  [66-71] 68  Resp:  [16-20] 17  BP: (139-178)/(73-87) 152/82  SpO2:  [92 %-95 %] 92 %    Physical Exam  Vitals and nursing note reviewed.   Constitutional:       General: She is not in acute distress.     Appearance: She is well-developed.      Comments: Lying in fetal position, appears uncomfortable   HENT:      Head: Normocephalic and atraumatic.      Mouth/Throat:      Pharynx: No oropharyngeal exudate.   Eyes:       General: Scleral icterus present.      Pupils: Pupils are equal, round, and reactive to light.   Neck:      Thyroid: No thyromegaly.   Cardiovascular:      Rate and Rhythm: Normal rate and regular rhythm.      Heart sounds: Normal heart sounds. No murmur heard.     Pulmonary:      Effort: Pulmonary effort is normal. No respiratory distress.      Breath sounds: Normal breath sounds. No wheezing.   Abdominal:      General: Bowel sounds are normal. There is no distension.      Palpations: Abdomen is soft.      Tenderness: There is abdominal tenderness. There is no guarding or rebound.      Comments: Positive Cifuentes's sign   Musculoskeletal:         General: No tenderness. Normal range of motion.      Cervical back: Normal range of motion and neck supple.      Right lower leg: No edema.      Left lower leg: No edema.   Skin:     General: Skin is warm and dry.      Findings: No rash.   Neurological:      Mental Status: She is alert and oriented to person, place, and time.      Cranial Nerves: No cranial nerve deficit.         Fluids    Intake/Output Summary (Last 24 hours) at 10/25/2021 1941  Last data filed at 10/25/2021 1800  Gross per 24 hour   Intake 790 ml   Output 0 ml   Net 790 ml       Laboratory  Recent Labs     10/23/21  1930 10/24/21  2256   WBC 11.8* 11.6*   RBC 2.56* 2.82*   HEMOGLOBIN 8.4* 9.4*   HEMATOCRIT 27.2* 29.8*   .3* 105.7*   MCH 32.8 33.3*   MCHC 30.9* 31.5*   RDW 63.7* 64.6*   PLATELETCT 357 387   MPV 11.6 11.5     Recent Labs     10/23/21  1930 10/24/21  2256   SODIUM 125* 128*   POTASSIUM 4.6 3.8   CHLORIDE 93* 93*   CO2 23 22   GLUCOSE 263* 289*   BUN 14 13   CREATININE 0.57 0.81   CALCIUM 8.4 8.8                   Imaging  US-RUQ   Final Result         1.  Gallbladder sludge and cholelithiasis with gallbladder wall thickening and positive sonographic Cifuentes's sign, findings sonographically compatible with cholecystitis.   2.  Hepatomegaly and echogenic liver, compatible with fatty  "change versus fibrosis.           Assessment/Plan  * Cholecystitis- (present on admission)  Assessment & Plan  -GI and general surgery consulted  -very complicated past medical history with ongoing hepatic issues with unclear etiology  -ERCP planned tomorrow, ?CBD stone, stricture, ?cancer, ?PSC  -NPO  -hold AC  -Eventual cholecystectomy (though not a great candidate for surgery)  -trend LFT\"s  -continue empiric IV zosyn for cholangitis coverage  -judicious use of narcotics    Hyperbilirubinemia- (present on admission)  Assessment & Plan  chronically elevated, was getting worked up for autoimmune hepatitis no formal diagnosis as of yet.  Worsening abdominal pain now found with cholecystitis on right upper quadrant ultrasound, CBD 3.7 mm.  Continue ursodiol.  Benadryl as needed-GI consulted  -recent bx shows possible bile duct involvement and PSC?  -see below, trend    Hyponatremia- (present on admission)  Assessment & Plan  Urine studies ordered.  In the 120s for the past week,?  Association with liver disease.  Does appear hypovolemic.  Continue to trend and monitor  -slowly improving, now 128, trend daily    Anemia- (present on admission)  Assessment & Plan  -near her baseline, monitor    CAD S/P percutaneous coronary angioplasty- (present on admission)  Assessment & Plan  Continue home medications: Aspirin, Zetia, metoprolol.      Hypertension- (present on admission)  Assessment & Plan  Continue home amlodipine, metoprolol    Uncontrolled type 1 diabetes mellitus (HCC)- (present on admission)  Assessment & Plan  Insulin sliding scale  POC glucose every 6 hours while n.p.o.       VTE prophylaxis: heparin ppx    I have performed a physical exam and reviewed and updated ROS and Plan today (10/25/2021). In review of yesterday's note (10/24/2021), there are no changes except as documented above.      "

## 2021-10-26 NOTE — ANESTHESIA TIME REPORT
Anesthesia Start and Stop Event Times     Date Time Event    10/26/2021 1024 Ready for Procedure     1047 Anesthesia Start     1128 Anesthesia Stop        Responsible Staff  10/26/21    Name Role Begin End    August W SHARON Early. Anesth 1047 1128        Preop Diagnosis (Free Text):  Pre-op Diagnosis     CHOLELITHIASIS        Preop Diagnosis (Codes):    Premium Reason  Non-Premium    Comments:

## 2021-10-26 NOTE — PROGRESS NOTES
Indication:Elevated LFT, possible cholangitis/choledcholithiasis.   Risks, benefits, and alternatives were discussed with with consenting person(s). Consenting person(s) were given an opportunity to ask questions and discuss other options. Risks including but not limited to failed or incomplete ERCP, stent migration, ineffective therapy, radiation exposure, pancreatitis (with potential future complications), contrast reaction, perforation, infection, bleeding, missed lesion(s), cardiac and/or pulmonary event, aspiration, stroke, possible need for surgery, hospitalization possibly prolonged, discomfort, unsuccessful and/or incomplete procedure, possible need for repeat procedures and/or additional testings, damage to adjacent organs and/or vascular structures, medication reaction, disability, death, and other adverse events possibly life-threatening. Discussion was undertaken with Layman's terms. Consenting persons stated understanding and acceptance of these risks, and wished to proceed. Consent was given in clear state of mind.

## 2021-10-27 ENCOUNTER — ANESTHESIA EVENT (OUTPATIENT)
Dept: SURGERY | Facility: MEDICAL CENTER | Age: 66
DRG: 418 | End: 2021-10-27
Payer: MEDICARE

## 2021-10-27 LAB
ALBUMIN SERPL BCP-MCNC: 2.4 G/DL (ref 3.2–4.9)
ALBUMIN/GLOB SERPL: 0.6 G/DL
ALP SERPL-CCNC: 1725 U/L (ref 30–99)
ALT SERPL-CCNC: 85 U/L (ref 2–50)
ANION GAP SERPL CALC-SCNC: 12 MMOL/L (ref 7–16)
AST SERPL-CCNC: 114 U/L (ref 12–45)
BASOPHILS # BLD AUTO: 0.7 % (ref 0–1.8)
BASOPHILS # BLD: 0.06 K/UL (ref 0–0.12)
BILIRUB SERPL-MCNC: 8.8 MG/DL (ref 0.1–1.5)
BUN SERPL-MCNC: 17 MG/DL (ref 8–22)
CALCIUM SERPL-MCNC: 8.2 MG/DL (ref 8.5–10.5)
CHLORIDE SERPL-SCNC: 94 MMOL/L (ref 96–112)
CO2 SERPL-SCNC: 22 MMOL/L (ref 20–33)
CREAT SERPL-MCNC: 1.12 MG/DL (ref 0.5–1.4)
EOSINOPHIL # BLD AUTO: 0.03 K/UL (ref 0–0.51)
EOSINOPHIL NFR BLD: 0.3 % (ref 0–6.9)
ERYTHROCYTE [DISTWIDTH] IN BLOOD BY AUTOMATED COUNT: 63.6 FL (ref 35.9–50)
GLOBULIN SER CALC-MCNC: 3.7 G/DL (ref 1.9–3.5)
GLUCOSE BLD-MCNC: 133 MG/DL (ref 65–99)
GLUCOSE BLD-MCNC: 172 MG/DL (ref 65–99)
GLUCOSE BLD-MCNC: 194 MG/DL (ref 65–99)
GLUCOSE BLD-MCNC: 315 MG/DL (ref 65–99)
GLUCOSE BLD-MCNC: 430 MG/DL (ref 65–99)
GLUCOSE BLD-MCNC: 442 MG/DL (ref 65–99)
GLUCOSE SERPL-MCNC: 464 MG/DL (ref 65–99)
HCT VFR BLD AUTO: 26.4 % (ref 37–47)
HGB BLD-MCNC: 8.1 G/DL (ref 12–16)
IMM GRANULOCYTES # BLD AUTO: 0.06 K/UL (ref 0–0.11)
IMM GRANULOCYTES NFR BLD AUTO: 0.7 % (ref 0–0.9)
LYMPHOCYTES # BLD AUTO: 0.93 K/UL (ref 1–4.8)
LYMPHOCYTES NFR BLD: 10.8 % (ref 22–41)
MCH RBC QN AUTO: 33.2 PG (ref 27–33)
MCHC RBC AUTO-ENTMCNC: 30.7 G/DL (ref 33.6–35)
MCV RBC AUTO: 108.2 FL (ref 81.4–97.8)
MONOCYTES # BLD AUTO: 1.17 K/UL (ref 0–0.85)
MONOCYTES NFR BLD AUTO: 13.6 % (ref 0–13.4)
NEUTROPHILS # BLD AUTO: 6.38 K/UL (ref 2–7.15)
NEUTROPHILS NFR BLD: 73.9 % (ref 44–72)
NRBC # BLD AUTO: 0 K/UL
NRBC BLD-RTO: 0 /100 WBC
PLATELET # BLD AUTO: 339 K/UL (ref 164–446)
PMV BLD AUTO: 12 FL (ref 9–12.9)
POTASSIUM SERPL-SCNC: 4.6 MMOL/L (ref 3.6–5.5)
PROT SERPL-MCNC: 6.1 G/DL (ref 6–8.2)
RBC # BLD AUTO: 2.44 M/UL (ref 4.2–5.4)
SODIUM SERPL-SCNC: 128 MMOL/L (ref 135–145)
WBC # BLD AUTO: 8.6 K/UL (ref 4.8–10.8)

## 2021-10-27 PROCEDURE — 36415 COLL VENOUS BLD VENIPUNCTURE: CPT

## 2021-10-27 PROCEDURE — 700102 HCHG RX REV CODE 250 W/ 637 OVERRIDE(OP): Performed by: FAMILY MEDICINE

## 2021-10-27 PROCEDURE — 80053 COMPREHEN METABOLIC PANEL: CPT

## 2021-10-27 PROCEDURE — 700102 HCHG RX REV CODE 250 W/ 637 OVERRIDE(OP): Performed by: STUDENT IN AN ORGANIZED HEALTH CARE EDUCATION/TRAINING PROGRAM

## 2021-10-27 PROCEDURE — 99232 SBSQ HOSP IP/OBS MODERATE 35: CPT | Performed by: FAMILY MEDICINE

## 2021-10-27 PROCEDURE — A9270 NON-COVERED ITEM OR SERVICE: HCPCS | Performed by: STUDENT IN AN ORGANIZED HEALTH CARE EDUCATION/TRAINING PROGRAM

## 2021-10-27 PROCEDURE — A9270 NON-COVERED ITEM OR SERVICE: HCPCS | Performed by: FAMILY MEDICINE

## 2021-10-27 PROCEDURE — 700105 HCHG RX REV CODE 258: Performed by: STUDENT IN AN ORGANIZED HEALTH CARE EDUCATION/TRAINING PROGRAM

## 2021-10-27 PROCEDURE — 82962 GLUCOSE BLOOD TEST: CPT

## 2021-10-27 PROCEDURE — 700111 HCHG RX REV CODE 636 W/ 250 OVERRIDE (IP): Performed by: STUDENT IN AN ORGANIZED HEALTH CARE EDUCATION/TRAINING PROGRAM

## 2021-10-27 PROCEDURE — 85025 COMPLETE CBC W/AUTO DIFF WBC: CPT

## 2021-10-27 PROCEDURE — 700105 HCHG RX REV CODE 258: Performed by: FAMILY MEDICINE

## 2021-10-27 PROCEDURE — 99233 SBSQ HOSP IP/OBS HIGH 50: CPT | Performed by: SURGERY

## 2021-10-27 PROCEDURE — 770006 HCHG ROOM/CARE - MED/SURG/GYN SEMI*

## 2021-10-27 RX ORDER — INSULIN LISPRO 100 [IU]/ML
2-14 INJECTION, SOLUTION INTRAVENOUS; SUBCUTANEOUS EVERY 6 HOURS
Status: DISCONTINUED | OUTPATIENT
Start: 2021-10-27 | End: 2021-10-31

## 2021-10-27 RX ORDER — SODIUM CHLORIDE 9 MG/ML
INJECTION, SOLUTION INTRAVENOUS CONTINUOUS
Status: DISCONTINUED | OUTPATIENT
Start: 2021-10-27 | End: 2021-10-31

## 2021-10-27 RX ORDER — DEXTROSE MONOHYDRATE 25 G/50ML
50 INJECTION, SOLUTION INTRAVENOUS
Status: DISCONTINUED | OUTPATIENT
Start: 2021-10-27 | End: 2021-10-31

## 2021-10-27 RX ADMIN — ACETAMINOPHEN 650 MG: 325 TABLET ORAL at 03:35

## 2021-10-27 RX ADMIN — ONDANSETRON 4 MG: 2 INJECTION INTRAMUSCULAR; INTRAVENOUS at 03:35

## 2021-10-27 RX ADMIN — INSULIN LISPRO 2 UNITS: 100 INJECTION, SOLUTION INTRAVENOUS; SUBCUTANEOUS at 17:04

## 2021-10-27 RX ADMIN — PIPERACILLIN SODIUM AND TAZOBACTAM SODIUM 3.38 G: 3; .375 INJECTION, POWDER, LYOPHILIZED, FOR SOLUTION INTRAVENOUS at 14:01

## 2021-10-27 RX ADMIN — SERTRALINE HYDROCHLORIDE 100 MG: 100 TABLET, FILM COATED ORAL at 16:55

## 2021-10-27 RX ADMIN — HYDROMORPHONE HYDROCHLORIDE 0.5 MG: 1 INJECTION, SOLUTION INTRAMUSCULAR; INTRAVENOUS; SUBCUTANEOUS at 04:42

## 2021-10-27 RX ADMIN — INSULIN LISPRO 2 UNITS: 100 INJECTION, SOLUTION INTRAVENOUS; SUBCUTANEOUS at 23:21

## 2021-10-27 RX ADMIN — AMLODIPINE BESYLATE 5 MG: 10 TABLET ORAL at 16:55

## 2021-10-27 RX ADMIN — URSODIOL 300 MG: 300 CAPSULE ORAL at 04:59

## 2021-10-27 RX ADMIN — OXYCODONE HYDROCHLORIDE 10 MG: 10 TABLET ORAL at 01:29

## 2021-10-27 RX ADMIN — OXYCODONE HYDROCHLORIDE 10 MG: 10 TABLET ORAL at 23:18

## 2021-10-27 RX ADMIN — HYDROMORPHONE HYDROCHLORIDE 0.5 MG: 1 INJECTION, SOLUTION INTRAMUSCULAR; INTRAVENOUS; SUBCUTANEOUS at 19:40

## 2021-10-27 RX ADMIN — TRAZODONE HYDROCHLORIDE 100 MG: 50 TABLET ORAL at 03:34

## 2021-10-27 RX ADMIN — OMEPRAZOLE 20 MG: 20 CAPSULE, DELAYED RELEASE ORAL at 04:44

## 2021-10-27 RX ADMIN — DOCUSATE SODIUM 50 MG AND SENNOSIDES 8.6 MG 2 TABLET: 8.6; 5 TABLET, FILM COATED ORAL at 04:42

## 2021-10-27 RX ADMIN — METOPROLOL SUCCINATE 200 MG: 100 TABLET, EXTENDED RELEASE ORAL at 04:42

## 2021-10-27 RX ADMIN — ALPRAZOLAM 0.25 MG: 0.25 TABLET ORAL at 03:35

## 2021-10-27 RX ADMIN — TRAZODONE HYDROCHLORIDE 100 MG: 50 TABLET ORAL at 23:24

## 2021-10-27 RX ADMIN — LEVOTHYROXINE SODIUM 225 MCG: 0.12 TABLET ORAL at 04:43

## 2021-10-27 RX ADMIN — INSULIN LISPRO 6 UNITS: 100 INJECTION, SOLUTION INTRAVENOUS; SUBCUTANEOUS at 05:01

## 2021-10-27 RX ADMIN — PIPERACILLIN SODIUM AND TAZOBACTAM SODIUM 3.38 G: 3; .375 INJECTION, POWDER, LYOPHILIZED, FOR SOLUTION INTRAVENOUS at 21:32

## 2021-10-27 RX ADMIN — EZETIMIBE 10 MG: 10 TABLET ORAL at 16:55

## 2021-10-27 RX ADMIN — PIPERACILLIN SODIUM AND TAZOBACTAM SODIUM 3.38 G: 3; .375 INJECTION, POWDER, LYOPHILIZED, FOR SOLUTION INTRAVENOUS at 04:42

## 2021-10-27 RX ADMIN — SODIUM CHLORIDE: 9 INJECTION, SOLUTION INTRAVENOUS at 10:04

## 2021-10-27 RX ADMIN — OXYCODONE HYDROCHLORIDE 10 MG: 10 TABLET ORAL at 10:34

## 2021-10-27 RX ADMIN — MAGNESIUM HYDROXIDE 30 ML: 400 SUSPENSION ORAL at 03:41

## 2021-10-27 RX ADMIN — INSULIN LISPRO 8 UNITS: 100 INJECTION, SOLUTION INTRAVENOUS; SUBCUTANEOUS at 11:38

## 2021-10-27 RX ADMIN — OXYCODONE HYDROCHLORIDE 10 MG: 10 TABLET ORAL at 16:55

## 2021-10-27 ASSESSMENT — ENCOUNTER SYMPTOMS
CHILLS: 0
FEVER: 0
FLANK PAIN: 0
CONSTIPATION: 0
FOCAL WEAKNESS: 0
HEARTBURN: 0
HEADACHES: 0
BLOOD IN STOOL: 0
COUGH: 0
MYALGIAS: 0
EYE PAIN: 0
NAUSEA: 1
SENSORY CHANGE: 0
EYE DISCHARGE: 0
ABDOMINAL PAIN: 1
ORTHOPNEA: 0
WEAKNESS: 1
PALPITATIONS: 0
BACK PAIN: 1
DIZZINESS: 0
SPEECH CHANGE: 0
VOMITING: 0
NAUSEA: 0
DIAPHORESIS: 0
DIARRHEA: 0
NECK PAIN: 0
SHORTNESS OF BREATH: 0
EYE REDNESS: 0
SORE THROAT: 0
BLURRED VISION: 0
BACK PAIN: 0
WHEEZING: 0
NERVOUS/ANXIOUS: 1

## 2021-10-27 ASSESSMENT — PAIN DESCRIPTION - PAIN TYPE
TYPE: ACUTE PAIN

## 2021-10-27 NOTE — CARE PLAN
Problem: Pain - Standard  Goal: Alleviation of pain or a reduction in pain to the patient’s comfort goal  Outcome: Progressing  Note: Pain level is much more managed as opposed to prior days  during current admission. Patient reports relief and is not requiring PRN pain medications as frequently      Problem: Fall Risk  Goal: Patient will remain free from falls  Outcome: Progressing  Note: Pt refusing bed alarm, however is calling appropriately and is educated on fall risk level. Pt agreeable to call prior to OOB and is demonstrating compliance   The patient is Stable - Low risk of patient condition declining or worsening    Shift Goals  Clinical Goals: determine surgical plan  Patient Goals: sleep and pain control  Family Goals: No family present    Progress made toward(s) clinical / shift goals:  Sx tomorrow 10/27.    Patient is not progressing towards the following goals:

## 2021-10-27 NOTE — DIETARY
"Nutrition services: Day 2 of admit.  Anu Manuel is a 66 y.o. female with admitting DX of cholecystitis.    Consult received for unsure wt loss. Admit screen is negative for poor PO.  Attempted to speak with pt at bedside, however pt was sleeping and did not rouse to name. Pt appeared adequately nourished.     Assessment:  Height: 167.6 cm (5' 6\")  Weight: 65.8 kg (145 lb)  Body mass index is 23.4 kg/m²., BMI classification: normal  Diet/Intake: Consistent CHO (Diabetic); PO >75% of meals    Evaluation:   1. EGD with ERCP 10/26.  2. Wt per chart review, 139 lbs 5/19/21. No wt loss noted.    Malnutrition Risk: Does not meet criteria per ASPEN guidelines at this time.    Recommendations/Plan:  1. Encourage intake of meals.  2. Document intake of all meals as % taken in ADLs to provide interdisciplinary communication across all shifts.   3. Monitor weight.  4. Nutrition rep will continue to see patient for ongoing meal and snack preferences.     RD will continue to follow per dept guidelines.       "

## 2021-10-27 NOTE — PROGRESS NOTES
Gastroenterology Progress Note               Author:  MARK Harper/Jeni Soto MD Date & Time Created: 10/27/2021 9:34 AM       Patient ID:  Name:             Anu Manuel  YOB: 1955  Age:                 66 y.o.  female  MRN:               [id number]      Referring Provider:  Dixon Chicas MD      Presenting Chief Complaint:  Abdominal pain, Cholestatic liver disease/injury      History of Present Illness:    She is a 66-year-old female patient who is seen in consultation for abdominal pain cholestatic liver disease/injury.  The patient presented to the hospital on 10/24/2021 with worsening abdominal pain.  It is located in the upper abdomen and is described as achy and crampy, sometimes sharp, rated as severe with no aggravating or alleviating factors.  She states the pain is progressive.  She does have some nausea but no vomiting.  Labs and imaging reviewed from this admission which demonstrate CBC significant for WBC 1.6, hemoglobin 9.4, hematocrit 29.8, .7, platelets are normal.  CHEM panel sodium 128, , , alkaline phosphatase 1948, bilirubin 9.9.  Lipase is normal.  Right upper quadrant ultrasound demonstrates gallbladder sludge and cholelithiasis with gallbladder wall thickening and positive sonographic Cifuentes sign.  She is noted to have hepatomegaly and echogenic liver compatible with fatty change versus fibrosis.  Common bile duct measures 3.7 mm.    The patient was just recently seen by our group in the hospital initially by Dr. Hayden Zavala and DANNY Delgado with significantly elevated liver enzymes cholestatic pattern.  She was transferred to [facility] for hepatology evaluation, however patient left AGAINST MEDICAL ADVICE from that facility due to wanting a cigarette.  She was then reseen here on 9/28/2021 by myself and Dr. Nikunj Lenz.  Previous MRCP did not demonstrate high-grade bile duct obstruction or  "choledocholithiasis.  Ultrasound demonstrated cholelithiasis but no evidence of cholecystitis.  Acute and chronic liver work-up did not demonstrate an elevated SCARLETT, but smooth muscle antibody, IgG, IgG4, and antimitochondrial antibody and other liver work-up was essentially normal.  She was started on ursodiol for possible PBC.  Liver biopsy was performed initially with nonspecific findings, but delayed reading by [facility] second opinion stated consistent with large bile duct obstruction.  CT did demonstrate some thickening of areas of the colon, but fecal calprotectin was normal on 9/29/2021.    Additional past medical history does include type 1 diabetes mellitus, hyperlipidemia LAD stent in August 2020, coronary artery disease, tobacco user.    Interval History  10/26/2021: ERCP  Impressions:  1.  Normal-appearing bile duct status post ERCP sphincterotomy balloon sweep cytology brushing and placement of a prophylactic 10 Bengali by 5 cm plastic biliary stent for biliary decompression  2.  Gastric biopsies taken  3.  J-shaped stomach    10/27/2021: LFTs stable. Had indigestion and heartburn last night, but no further pain. No nausea.     Review of Systems:  Review of Systems   Constitutional: Positive for malaise/fatigue. Negative for chills and fever.   Eyes: Negative for pain, discharge and redness.   Respiratory: Negative for cough and wheezing.    Cardiovascular: Negative for chest pain, palpitations and orthopnea.   Gastrointestinal: Positive for abdominal pain. Negative for blood in stool, constipation, melena and nausea.   Genitourinary: Negative for urgency.   Musculoskeletal: Positive for back pain and joint pain. Negative for myalgias.   Skin: Positive for itching. Negative for rash.   Neurological: Positive for weakness. Negative for dizziness.             Past Medical History:  Past Medical History:   Diagnosis Date   • Adverse effect of anesthesia     in 2008 \"throat closes up\"\"anxiety\" " "?laryngospasm, kept in ICU. Pt states no problems currently 2018.    • Anesthesia     in 2008 \"throat closes up\"\"anxiety\" ?laryngospasm, kept in ICU. Pt states no problems currently 2018.    • Arthritis     right shoulder, hands   • Cigarette smoker quit 2013   • Dental disorder     missing teeth    • depression 8/30/2016    denies depression, states has anxiety and panic attacks   • Diabetes mellitus type 1 (HCC) 1989    insulin   • Encounter for long-term (current) use of insulin ([facility]) 9/25/2013   • Heart burn    • High cholesterol    • Hypertension    • Hypothyroidism, postsurgical 1970   • Indigestion    • Infectious disease      had hepatitis C, tested neg.   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.63) 6/10/2015     Active Hospital Problems    Diagnosis    • Chronic pain [G89.29]    • Cholecystitis [K81.9]    • Hepatitis [K75.9]    • Hyponatremia [E87.1]    • Anemia [D64.9]    • CAD S/P percutaneous coronary angioplasty [I25.10, Z98.61]    • Anxiety [F41.9]    • Hypertension [I10]    • Uncontrolled type 1 diabetes mellitus (HCC) [E10.65]          Past Surgical History:  Past Surgical History:   Procedure Laterality Date   • PB ERCP,DIAGNOSTIC N/A 10/26/2021    Procedure: ERCP (ENDOSCOPIC RETROGRADE CHOLANGIOPANCREATOGRAPHY);  Surgeon: Cr Haro M.D.;  Location: SURGERY SAME DAY [facility];  Service: Gastroenterology   • PB UPPER GI ENDOSCOPY,BIOPSY N/A 10/26/2021    Procedure: GASTROSCOPY, WITH BIOPSY;  Surgeon: Cr Haro M.D.;  Location: SURGERY SAME DAY [facility];  Service: Gastroenterology   • PB UPPER GI ENDOSCOPY,DIAGNOSIS N/A 10/26/2021    Procedure: GASTROSCOPY;  Surgeon: Cr Haro M.D.;  Location: SURGERY SAME DAY [facility];  Service: Gastroenterology   • CATH PLACEMENT  1/25/2020 "    Procedure: INSERTION, CATHETER PERM;  Surgeon: Rola Mendoza M.D.;  Location: SURGERY [facility];  Service: General   • IRRIGATION & DEBRIDEMENT NEURO  1/19/2020    Procedure: IRRIGATION AND DEBRIDEMENT, WOUND THORACIC AND LUMBAR;  Surgeon: Ryan Roman M.D.;  Location: [facility];  Service: Neurosurgery   • PB IMPLANT NEUROSTIM/ N/A 12/16/2019    Procedure: EXPLORATION AT THORACIC 8 - 9, REPLACEMENT OF  NEUROSTIMULATOR LEAD;  Surgeon: Ryan Roman M.D.;  Location: SURGERY [facility];  Service: Neurosurgery   • SPINAL CORD STIMULATOR N/A 10/26/2018    Procedure: SPINAL CORD STIMULATOR;  Surgeon: Ryan Roman M.D.;  Location: [facility];  Service: Neurosurgery   • THORACIC LAMINECTOMY N/A 10/26/2018    Procedure: THORACIC LAMINECTOMY - FOR;  Surgeon: Ryan Roman M.D.;  Location: [facility];  Service: Neurosurgery   • SD NJX AA&/STRD TFRML EPI LUMBAR/SACRAL 1 LEVEL Right 8/31/2016    Procedure: INJ-FORAMEN EPI LUM/SAC SNGL L4-5;  Surgeon: Sukhi Godfrey M.D.;  Location: [facility];  Service: Pain Management   • LUMBAR LAMINECTOMY DISKECTOMY Right 5/10/2016    Procedure: LUMBAR L4-5 HEMILAMINECTOMY DISKECTOMY ;  Surgeon: Arnold Keyes M.D.;  Location: [facility];  Service:    • CERVICAL FUSION POSTERIOR  1/16/2009    Performed by TARA CONTRERAS at [facility]   • HARDWARE REMOVAL NEURO  1/16/2009    Performed by TARA CONTRERAS at [facility]   • NECK EXPLORATION  1/16/2009    Performed by TARA CONTRERAS at [facility]   • SHOULDER ARTHROSCOPY W/ ROTATOR CUFF REPAIR  10/9/08    Performed by SHERLY CASTANEDA at [facility]   • SHOULDER DECOMPRESSION ARTHROSCOPIC  6/17/08    Performed by SHERLY CASTANEDA at [facility]   • CLAVICLE DISTAL EXCISION  6/17/08    Performed by SHERLY CASTANEDA at [facility]   • CERVICAL DISK AND FUSION ANTERIOR   03/12/08   • HYSTERECTOMY, VAGINAL  2006   • APPENDECTOMY  2004   • THYROID LOBECTOMY  1973   • TONSILLECTOMY  1963   • ABDOMINAL HYSTERECTOMY TOTAL     • LUMPECTOMY  1976, 2005    Breast    • LUMPECTOMY             Hospital Medications:  Current Facility-Administered Medications   Medication Dose Frequency Provider Last Rate Last Admin   • NS infusion   Continuous Vladimir Almanzar M.D.       • [Held by provider] insulin glargine (Semglee) injection  0.2 Units/kg/day Q EVENING Andres Ho M.D.   13 Units at 10/25/21 1800    And   • insulin lispro (AdmeLOG) injection  2-14 Units Q6HRS Vladimir Almanzar M.D.        And   • glucose 4 g chewable tablet 16 g  16 g Q15 MIN PRN Vladimir Almanzar M.D.        And   • dextrose 50% (D50W) injection 50 mL  50 mL Q15 MIN PRN Vladimir Almanzar M.D.       • ALPRAZolam (XANAX) tablet 0.25 mg  0.25 mg BID PRN Vladimir Almanzar M.D.   0.25 mg at 10/27/21 0335   • aspirin EC (ECOTRIN) tablet 81 mg  81 mg QA Andres Ho M.D.       • amLODIPine (NORVASC) tablet 5 mg  5 mg Q EVENING Andres Ho M.D.   5 mg at 10/26/21 1800   • ezetimibe (ZETIA) tablet 10 mg  10 mg Q EVENING Andres Ho M.D.   10 mg at 10/26/21 1640   • metoprolol SR (TOPROL XL) tablet 200 mg  200 mg DAILY Andres Ho M.D.   200 mg at 10/27/21 0442   • oxyCODONE immediate release (ROXICODONE) tablet 10 mg  10 mg Q6HRS PRN Andres Ho M.D.   10 mg at 10/27/21 0129   • omeprazole (PRILOSEC) capsule 20 mg  20 mg QA Andres Ho M.D.   20 mg at 10/27/21 0444   • sertraline (Zoloft) tablet 100 mg  100 mg Q EVENING Andres Ho M.D.   100 mg at 10/26/21 1640   • levothyroxine (SYNTHROID) tablet 225 mcg  225 mcg QAM Andres Ho M.D.   225 mcg at 10/27/21 0443   • traZODone (DESYREL) tablet 100 mg  100 mg QHS PRN Andres Ho M.D.   100 mg at 10/27/21 0334   • ursodiol (ACTIGALL) capsule 300 mg  300 mg DAILY Andres Ho M.D.   300 mg at 10/27/21 0459   •  senna-docusate (PERICOLACE or SENOKOT S) 8.6-50 MG per tablet 2 Tablet  2 Tablet BID Andres Ho M.D.   2 Tablet at 10/27/21 0442    And   • polyethylene glycol/lytes (MIRALAX) PACKET 1 Packet  1 Packet QDAY PRN Andres Ho M.D.        And   • magnesium hydroxide (MILK OF MAGNESIA) suspension 30 mL  30 mL QDAY PRN Andres Ho M.D.   30 mL at 10/27/21 0341    And   • bisacodyl (DULCOLAX) suppository 10 mg  10 mg QDAY PRN Andres Ho M.D.       • acetaminophen (Tylenol) tablet 650 mg  650 mg Q6HRS PRN Andres Ho M.D.   650 mg at 10/27/21 0335   • HYDROmorphone (Dilaudid) injection 0.5 mg  0.5 mg Q6HRS PRN Andres Ho M.D.   0.5 mg at 10/27/21 0442   • enalaprilat (Vasotec) injection 1.25 mg 1 mL  1.25 mg Q6HRS PRN Andres Ho M.D.       • labetalol (NORMODYNE/TRANDATE) injection 10 mg  10 mg Q4HRS PRN Andres Ho M.D.       • ondansetron (ZOFRAN) syringe/vial injection 4 mg  4 mg Q4HRS PRN Andres Ho M.D.   4 mg at 10/27/21 0335   • ondansetron (ZOFRAN ODT) dispertab 4 mg  4 mg Q4HRS PRN Andres Ho M.D.       • promethazine (PHENERGAN) tablet 12.5-25 mg  12.5-25 mg Q4HRS PRN Andres Ho M.D.       • promethazine (PHENERGAN) suppository 12.5-25 mg  12.5-25 mg Q4HRS PRN Andres Ho M.D.       • prochlorperazine (COMPAZINE) injection 5-10 mg  5-10 mg Q4HRS PRN Andres Ho M.D.       • piperacillin-tazobactam (ZOSYN) 3.375 g in  mL IVPB  3.375 g Q8HRS Andres Ho M.D.   Stopped at 10/27/21 0842   • diphenhydrAMINE (BENADRYL) tablet/capsule 25 mg  25 mg Q6HRS PRN Andres Ho M.D.       Last reviewed on 10/25/2021  4:22 AM by Payton Craven R.N.        Current Outpatient Medications:  Medications Prior to Admission   Medication Sig Dispense Refill Last Dose   • diphenhydrAMINE (BENADRYL ALLERGY) 25 MG capsule Take 1 Capsule by mouth every 6 hours as needed for Itching. 30 Capsule 2 prn at prn   • fentaNYL (DURAGESIC) 25  MCG/HR PATCH 72 HR Place 1 Patch on the skin every 72 hours for 15 days. 5 Patch 0 10/24/2021 at Unknown time   • ursodiol (ACTIGALL) 300 MG Cap Take 1 Capsule by mouth every day. 30 Capsule 0 10/24/2021 at am   • Ascorbic Acid (VITAMIN C) 250 MG Tab Take 250 mg by mouth every morning.   10/24/2021 at am   • vitamin D3 (QC VITAMIN D3) 5000 Unit (125 mcg) Tab Take 5,000 Units by mouth 2 times a day.   10/24/2021 at am   • pyridoxine (VITAMIN B-6) 50 MG Tab Take 50 mg by mouth every morning.   10/24/2021 at am   • famotidine (PEPCID) 20 MG Tab Take 20 mg by mouth 2 times a day.   10/24/2021 at am   • insulin aspart (NOVOLOG FLEXPEN) 100 UNIT/ML injection PEN Inject 1-5 Units under the skin in the morning, at noon, and at bedtime. 1 units for every 5 g of carbs   AND  Sliding Scale   150 - 200 = 1 units  201 - 250 = 2 units  251 - 300 = 3 units  301 - 350 = 4 units  351 - 400 = 5 units   10/24/2021 at 1200   • levothyroxine (SYNTHROID) 25 MCG Tab Take 25 mcg by mouth every morning on an empty stomach. 25mcg tablet + 200mcg tablet for a total dose of 225mcg   10/24/2021 at am   • B Complex Vitamins (VITAMIN B COMPLEX PO) Take 1 Tablet by mouth every morning.   10/24/2021 at am   • Docusate Calcium (STOOL SOFTENER PO) Take 1 Tablet by mouth every morning.   10/24/2021 at am   • traZODone (DESYREL) 100 MG Tab Take 100 mg by mouth at bedtime as needed for Sleep.   10/23/2021 at pm   • pantoprazole (PROTONIX) 40 MG Tablet Delayed Response Take 40 mg by mouth every morning.   10/24/2021 at am   • oxycodone 5 MG capsule Take 5 mg by mouth every four hours as needed for Severe Pain.   10/24/2021 at pm   • ondansetron (ZOFRAN ODT) 4 MG TABLET DISPERSIBLE Take 1 Tablet by mouth every 6 hours as needed for Nausea. 10 Tablet 0 prn at prn   • ezetimibe (ZETIA) 10 MG Tab Take 10 mg by mouth every evening.   10/23/2021 at pm   • TRESIBA FLEXTOUCH 100 UNIT/ML Solution Pen-injector Inject 24 Units under the skin every evening.    10/23/2021 at pm   • ALPRAZolam (XANAX) 0.5 MG Tab Take 0.5 mg by mouth at bedtime.   10/23/2021 at hs   • sertraline (ZOLOFT) 100 MG Tab Take 100 mg by mouth every evening.   10/23/2021 at pm   • amLODIPine (NORVASC) 10 MG Tab Take 5 mg by mouth every evening.   10/23/2021 at pm   • SYNTHROID 200 MCG Tab Take 200 mcg by mouth every morning. 200mcg tablet + 25mcg tablet for a total dose of 225mcg   10/24/2021 at am   • metoprolol (TOPROL-XL) 200 MG XL tablet Take 1 tablet by mouth every day. 90 tablet 3 10/24/2021 at am   • Continuous Blood Gluc Sensor (FREESTYLE PARISA 14 DAY SENSOR) Misc 1 MISCELLANEOUS E10.42 CHANGE SENSOR EVERY 14 DAYS   E11.65   supply at supply   • aspirin 81 MG EC tablet Take 1 Tab by mouth every morning. 90 Tab 3 10/24/2021 at am         Medication Allergies:  Allergies   Allergen Reactions   • Tape Unspecified     Blisters, paper tape is ok         Family Medical History:  Family History   Problem Relation Age of Onset   • Hypertension Mother    • Cancer Father          Social History:  Social History     Socioeconomic History   • Marital status:      Spouse name: Not on file   • Number of children: Not on file   • Years of education: Not on file   • Highest education level: Not on file   Occupational History   • Not on file   Tobacco Use   • Smoking status: Current Every Day Smoker     Packs/day: 0.50     Years: 30.00     Pack years: 15.00     Types: Cigarettes   • Smokeless tobacco: Never Used   Vaping Use   • Vaping Use: Never used   Substance and Sexual Activity   • Alcohol use: Not Currently   • Drug use: Yes     Comment: Marijuana   • Sexual activity: Not on file   Other Topics Concern   • Not on file   Social History Narrative   • Not on file     Social Determinants of Health     Financial Resource Strain:    • Difficulty of Paying Living Expenses:    Food Insecurity:    • Worried About Running Out of Food in the Last Year:    • Ran Out of Food in the Last Year:   "  Transportation Needs:    • Lack of Transportation (Medical):    • Lack of Transportation (Non-Medical):    Physical Activity:    • Days of Exercise per Week:    • Minutes of Exercise per Session:    Stress:    • Feeling of Stress :    Social Connections:    • Frequency of Communication with Friends and Family:    • Frequency of Social Gatherings with Friends and Family:    • Attends Jainism Services:    • Active Member of Clubs or Organizations:    • Attends Club or Organization Meetings:    • Marital Status:    Intimate Partner Violence:    • Fear of Current or Ex-Partner:    • Emotionally Abused:    • Physically Abused:    • Sexually Abused:          Vital signs:  Weight/BMI: Body mass index is 23.4 kg/m².  /59   Pulse 68   Temp 37.2 °C (99 °F) (Temporal)   Resp 18   Ht 1.676 m (5' 6\")   Wt 65.8 kg (145 lb)   SpO2 90%   Vitals:    10/26/21 2000 10/27/21 0000 10/27/21 0357 10/27/21 0712   BP: 138/72 129/55 129/56 111/59   Pulse: 62 71 61 68   Resp: 16 16 17 18   Temp: 37.3 °C (99.1 °F) 37.2 °C (99 °F) 36.9 °C (98.4 °F) 37.2 °C (99 °F)   TempSrc: Temporal Temporal Temporal Temporal   SpO2: 94% 91% 96% 90%   Weight:       Height:         Oxygen Therapy:  Pulse Oximetry: 90 %, O2 (LPM): 0, O2 Delivery Device: None - Room Air    Intake/Output Summary (Last 24 hours) at 10/27/2021 0934  Last data filed at 10/26/2021 1800  Gross per 24 hour   Intake 720 ml   Output 1 ml   Net 719 ml         Physical Exam:  Physical Exam  Vitals and nursing note reviewed.   Constitutional:       Appearance: Normal appearance.   HENT:      Head: Normocephalic and atraumatic.      Right Ear: External ear normal.      Left Ear: External ear normal.      Nose: Nose normal.      Mouth/Throat:      Mouth: Mucous membranes are dry.      Pharynx: Oropharynx is clear.   Eyes:      General: Scleral icterus present.      Extraocular Movements: Extraocular movements intact.   Cardiovascular:      Rate and Rhythm: Normal rate and " regular rhythm.      Pulses: Normal pulses.      Heart sounds: Normal heart sounds. No murmur heard.     Pulmonary:      Effort: Pulmonary effort is normal. No respiratory distress.      Breath sounds: Normal breath sounds. No wheezing.   Abdominal:      General: Abdomen is flat. Bowel sounds are normal.      Palpations: Abdomen is soft.      Tenderness: There is abdominal tenderness (Epigastric, RUQ).   Musculoskeletal:      Cervical back: Neck supple.      Right lower leg: No edema.      Left lower leg: No edema.   Lymphadenopathy:      Cervical: No cervical adenopathy.   Skin:     General: Skin is warm and dry.      Coloration: Skin is jaundiced.   Neurological:      General: No focal deficit present.      Mental Status: She is alert and oriented to person, place, and time.   Psychiatric:         Mood and Affect: Mood normal.         Thought Content: Thought content normal.         Judgment: Judgment normal.         Labs:  Recent Labs     10/24/21  2256 10/25/21  0920 10/26/21  0308 10/27/21  0400   SODIUM 128*  --  135 128*   POTASSIUM 3.8  --  3.6 4.6   CHLORIDE 93*  --  102 94*   CO2 22  --  22 22   BUN 13  --  12 17   CREATININE 0.81  --  0.94 1.12   MAGNESIUM  --  2.2  --   --    PHOSPHORUS  --  3.5  --   --    CALCIUM 8.8  --  8.4* 8.2*     Recent Labs     10/24/21  2256 10/25/21  0920 10/26/21  0308 10/27/21  0400   ALTSGPT 110*  --  91* 85*   ASTSGOT 180*  --  137* 114*   ALKPHOSPHAT 1948*  --  1694* 1725*   TBILIRUBIN 9.9*  --  8.0* 8.8*   DBILIRUBIN  --  5.8*  --   --    LIPASE 12  --   --   --    GLUCOSE 289*  --  41* 464*     Recent Labs     10/24/21  2256 10/26/21  0308 10/27/21  0400   WBC 11.6* 10.0 8.6   NEUTSPOLYS 79.90* 70.70 73.90*   LYMPHOCYTES 8.00* 15.50* 10.80*   MONOCYTES 8.40 10.30 13.60*   EOSINOPHILS 2.60 2.60 0.30   BASOPHILS 0.60 0.90 0.70   ASTSGOT 180* 137* 114*   ALTSGPT 110* 91* 85*   ALKPHOSPHAT 1948* 1694* 1725*   TBILIRUBIN 9.9* 8.0* 8.8*     Recent Labs     10/24/21  3517  10/26/21  0308 10/27/21  0400   RBC 2.82* 2.38* 2.44*   HEMOGLOBIN 9.4* 8.0* 8.1*   HEMATOCRIT 29.8* 24.5* 26.4*   PLATELETCT 387 324 339   PROTHROMBTM  --  13.9  --    INR  --  1.11  --      Recent Results (from the past 24 hour(s))   POCT glucose device results    Collection Time: 10/26/21  9:47 AM   Result Value Ref Range    Glucose - Accu-Ck 147 (H) 65 - 99 mg/dL   POCT glucose device results    Collection Time: 10/26/21 10:15 AM   Result Value Ref Range    Glucose - Accu-Ck 114 (H) 65 - 99 mg/dL   POCT glucose device results    Collection Time: 10/26/21 11:43 AM   Result Value Ref Range    Glucose - Accu-Ck 154 (H) 65 - 99 mg/dL   Histology Request    Collection Time: 10/26/21 12:01 PM   Result Value Ref Range    Pathology Request Sent to Histo    POCT glucose device results    Collection Time: 10/26/21 12:37 PM   Result Value Ref Range    Glucose - Accu-Ck 117 (H) 65 - 99 mg/dL   POCT glucose device results    Collection Time: 10/26/21  4:39 PM   Result Value Ref Range    Glucose - Accu-Ck 133 (H) 65 - 99 mg/dL   POCT glucose device results    Collection Time: 10/27/21 12:07 AM   Result Value Ref Range    Glucose - Accu-Ck 430 (HH) 65 - 99 mg/dL   CBC WITH DIFFERENTIAL    Collection Time: 10/27/21  4:00 AM   Result Value Ref Range    WBC 8.6 4.8 - 10.8 K/uL    RBC 2.44 (L) 4.20 - 5.40 M/uL    Hemoglobin 8.1 (L) 12.0 - 16.0 g/dL    Hematocrit 26.4 (L) 37.0 - 47.0 %    .2 (H) 81.4 - 97.8 fL    MCH 33.2 (H) 27.0 - 33.0 pg    MCHC 30.7 (L) 33.6 - 35.0 g/dL    RDW 63.6 (H) 35.9 - 50.0 fL    Platelet Count 339 164 - 446 K/uL    MPV 12.0 9.0 - 12.9 fL    Neutrophils-Polys 73.90 (H) 44.00 - 72.00 %    Lymphocytes 10.80 (L) 22.00 - 41.00 %    Monocytes 13.60 (H) 0.00 - 13.40 %    Eosinophils 0.30 0.00 - 6.90 %    Basophils 0.70 0.00 - 1.80 %    Immature Granulocytes 0.70 0.00 - 0.90 %    Nucleated RBC 0.00 /100 WBC    Neutrophils (Absolute) 6.38 2.00 - 7.15 K/uL    Lymphs (Absolute) 0.93 (L) 1.00 - 4.80 K/uL     Monos (Absolute) 1.17 (H) 0.00 - 0.85 K/uL    Eos (Absolute) 0.03 0.00 - 0.51 K/uL    Baso (Absolute) 0.06 0.00 - 0.12 K/uL    Immature Granulocytes (abs) 0.06 0.00 - 0.11 K/uL    NRBC (Absolute) 0.00 K/uL   Comp Metabolic Panel    Collection Time: 10/27/21  4:00 AM   Result Value Ref Range    Sodium 128 (L) 135 - 145 mmol/L    Potassium 4.6 3.6 - 5.5 mmol/L    Chloride 94 (L) 96 - 112 mmol/L    Co2 22 20 - 33 mmol/L    Anion Gap 12.0 7.0 - 16.0    Glucose 464 (H) 65 - 99 mg/dL    Bun 17 8 - 22 mg/dL    Creatinine 1.12 0.50 - 1.40 mg/dL    Calcium 8.2 (L) 8.5 - 10.5 mg/dL    AST(SGOT) 114 (H) 12 - 45 U/L    ALT(SGPT) 85 (H) 2 - 50 U/L    Alkaline Phosphatase 1725 (H) 30 - 99 U/L    Total Bilirubin 8.8 (H) 0.1 - 1.5 mg/dL    Albumin 2.4 (L) 3.2 - 4.9 g/dL    Total Protein 6.1 6.0 - 8.2 g/dL    Globulin 3.7 (H) 1.9 - 3.5 g/dL    A-G Ratio 0.6 g/dL   ESTIMATED GFR    Collection Time: 10/27/21  4:00 AM   Result Value Ref Range    GFR If  59 (A) >60 mL/min/1.73 m 2    GFR If Non African American 49 (A) >60 mL/min/1.73 m 2   POCT glucose device results    Collection Time: 10/27/21  4:50 AM   Result Value Ref Range    Glucose - Accu-Ck 442 (HH) 65 - 99 mg/dL         Radiology Review:  OE-[facility] STENT - TUBE CHANGE   Final Result      Digitized intraoperative radiograph is submitted for review.  This examination is not for diagnostic purpose but for guidance during a surgical procedure.      US-RUQ   Final Result         1.  Gallbladder sludge and cholelithiasis with gallbladder wall thickening and positive sonographic Cifuentes's sign, findings sonographically compatible with cholecystitis.   2.  Hepatomegaly and echogenic liver, compatible with fatty change versus fibrosis.            MDM (Data Review):   -Records reviewed and summarized in current documentation  -I personally reviewed and interpreted the laboratory results  -I personally reviewed the radiology images        Medical Decision Making, by  Problem:  Active Hospital Problems    Diagnosis    • Cholecystitis [K81.9]    • Hyperbilirubinemia [E80.6]    • Hyponatremia [E87.1]    • CAD S/P percutaneous coronary angioplasty [I25.10, Z98.61]    • Hypertension [I10]    • Uncontrolled type 1 diabetes mellitus (HCC) [E10.65]            Assessment/Recommendations:  Assessment:  1.  Upper abdominal pain  2.  Continued elevated liver enzymes primarily cholestatic distribution.  Again her ultrasound does not demonstrate common bile duct dilation with 3.7 mm in size, however her pathology from biopsy is suggestive of large bile duct obstruction.  ERCP with no obstruction or abnormality in the CBD. Stent placed.  3.  Cholelithiasis and cholecystitis-surgery has asked for ERCP  4.  Hyponatremia  5.  Hypertension  6.  Tobacco user    Plan:  1.  Agree with Zosyn for empiric coverage of cholecystitis    2.  Await surgical recommendations, appreciate their input    3. ERCP and stent removal in 3 months    4. If patient has continued pain check lipase and repeat CT scan    5. Recommend tobacco cessation    6. Trend LFTs    7. Continue Ursodiol. Patient needs follow up with Dr. Hayden Zavala at [facility] and needs to call [facility] to make an appointment with Dr. Arjun Wagner [phone] ASAP    Gi will sign off. Please call for questions, concerns, or change in clinical status.     WILLIAM Harper.      Thank you for inviting me to participate in the care of this patient. Please do not hesitate to call GI consultants with additional questions/concerns or changes in the patient's clinical status at [phone].    Core Quality Measures   Reviewed items:  Labs, Medications and Radiology reports reviewed  ===  I have seen and examined the patient today.  I have reviewed the medical record, laboratory data, imaging, and all relevant studies.  I have discussed the assessment and plan of care with El DELGADO and agree with their note and plan of care as  documented.   Jeni Soto M.D.    GI TO SIGN OFF / STAND BY - Thank you very much for allowing me to participate in the care of your patient.  GI to sign off at this time.  If the patient develops changes in clinical course/decompensation or you have any additional questions or concerns, please don't hesitate to reengage GI Consultants.  If necessary, follow up with GI Consultants will be arranged  The patient will be called to schedule an appointment time.  If the patient does not receive a call from GI consultants scheduling or they have additional questions, recommend the patient call the clinic at [phone] or [phone] to schedule an appointment.

## 2021-10-27 NOTE — PROGRESS NOTES
Report received from RN, assumed care at 0700  Pt is A0X4, and responds appropriately   Pt declines any SOB, chest pain, new onset of numbness/ tingiling  Pt rates pain at 8/10, on a scale of 1-10, pt medicated per MAR  Pt is voiding, dark brown urine adequatly and without hesitancy  Pt has + flatus, + bowel sounds, + BM on 10/26  Pt ambulates with a x1 assist   Pt NPO for surgery   Plan of care discussed, all questions answered. Explained importance of calling before getting OOB and pt verbalizes understanding. Explained importance of oral care. Call light is within reach, treaded slipper socks on, bed in lowest/ locked position, hourly rounding in place, all needs met at this time

## 2021-10-27 NOTE — PROGRESS NOTES
Surgical Progress Note    Author: Tello Trammell M.D. Date & Time created: 10/27/2021   10:57 AM     Interval Events:  Labs reviewed   ERCP normal , gallbladder filled , no cystic or biliary duct occlusion , stented  Yet bili went up   Consider cholecystectomy to hopefully alleviate pain though acute peter ruled out   GI note reviewed and helpful   Review of Systems   Gastrointestinal: Positive for abdominal pain. Negative for nausea and vomiting.   Skin: Positive for itching.   All other systems reviewed and are negative.    Hemodynamics:  Temp (24hrs), Av °C (98.6 °F), Min:36.7 °C (98.1 °F), Max:37.3 °C (99.1 °F)  Temperature: 37.2 °C (99 °F)  Pulse  Av.4  Min: 54  Max: 71   Blood Pressure: 111/59     Respiratory:    Respiration: 18, Pulse Oximetry: 90 %        RUL Breath Sounds: Clear, RML Breath Sounds: Diminished, RLL Breath Sounds: Diminished, ARLEN Breath Sounds: Clear, LLL Breath Sounds: Diminished  Neuro:  GCS       Fluids:    Intake/Output Summary (Last 24 hours) at 10/26/2021 1057  Last data filed at 10/26/2021 1050  Gross per 24 hour   Intake 590 ml   Output --   Net 590 ml        Current Diet Order   Procedures   • Diet Order Diet: Consistent CHO (Diabetic)     Physical Exam  Vitals and nursing note reviewed. Exam conducted with a chaperone present.   Constitutional:       Appearance: She is not ill-appearing.   HENT:      Head: Normocephalic.   Eyes:      General: Scleral icterus present.      Pupils: Pupils are equal, round, and reactive to light.   Cardiovascular:      Rate and Rhythm: Normal rate and regular rhythm.      Pulses: Normal pulses.   Pulmonary:      Effort: Pulmonary effort is normal.      Breath sounds: Normal breath sounds.   Abdominal:      General: Bowel sounds are normal.      Palpations: Abdomen is soft.      Tenderness: There is abdominal tenderness.   Musculoskeletal:         General: Normal range of motion.   Skin:     General: Skin is warm.      Capillary Refill:  Capillary refill takes less than 2 seconds.   Neurological:      General: No focal deficit present.      Mental Status: She is alert.   Psychiatric:         Mood and Affect: Mood normal.       Labs:  Recent Results (from the past 24 hour(s))   POCT glucose device results    Collection Time: 10/26/21 11:43 AM   Result Value Ref Range    Glucose - Accu-Ck 154 (H) 65 - 99 mg/dL   Histology Request    Collection Time: 10/26/21 12:01 PM   Result Value Ref Range    Pathology Request Sent to Histo    POCT glucose device results    Collection Time: 10/26/21 12:37 PM   Result Value Ref Range    Glucose - Accu-Ck 117 (H) 65 - 99 mg/dL   POCT glucose device results    Collection Time: 10/26/21  4:39 PM   Result Value Ref Range    Glucose - Accu-Ck 133 (H) 65 - 99 mg/dL   POCT glucose device results    Collection Time: 10/27/21 12:07 AM   Result Value Ref Range    Glucose - Accu-Ck 430 (HH) 65 - 99 mg/dL   CBC WITH DIFFERENTIAL    Collection Time: 10/27/21  4:00 AM   Result Value Ref Range    WBC 8.6 4.8 - 10.8 K/uL    RBC 2.44 (L) 4.20 - 5.40 M/uL    Hemoglobin 8.1 (L) 12.0 - 16.0 g/dL    Hematocrit 26.4 (L) 37.0 - 47.0 %    .2 (H) 81.4 - 97.8 fL    MCH 33.2 (H) 27.0 - 33.0 pg    MCHC 30.7 (L) 33.6 - 35.0 g/dL    RDW 63.6 (H) 35.9 - 50.0 fL    Platelet Count 339 164 - 446 K/uL    MPV 12.0 9.0 - 12.9 fL    Neutrophils-Polys 73.90 (H) 44.00 - 72.00 %    Lymphocytes 10.80 (L) 22.00 - 41.00 %    Monocytes 13.60 (H) 0.00 - 13.40 %    Eosinophils 0.30 0.00 - 6.90 %    Basophils 0.70 0.00 - 1.80 %    Immature Granulocytes 0.70 0.00 - 0.90 %    Nucleated RBC 0.00 /100 WBC    Neutrophils (Absolute) 6.38 2.00 - 7.15 K/uL    Lymphs (Absolute) 0.93 (L) 1.00 - 4.80 K/uL    Monos (Absolute) 1.17 (H) 0.00 - 0.85 K/uL    Eos (Absolute) 0.03 0.00 - 0.51 K/uL    Baso (Absolute) 0.06 0.00 - 0.12 K/uL    Immature Granulocytes (abs) 0.06 0.00 - 0.11 K/uL    NRBC (Absolute) 0.00 K/uL   Comp Metabolic Panel    Collection Time: 10/27/21  4:00  AM   Result Value Ref Range    Sodium 128 (L) 135 - 145 mmol/L    Potassium 4.6 3.6 - 5.5 mmol/L    Chloride 94 (L) 96 - 112 mmol/L    Co2 22 20 - 33 mmol/L    Anion Gap 12.0 7.0 - 16.0    Glucose 464 (H) 65 - 99 mg/dL    Bun 17 8 - 22 mg/dL    Creatinine 1.12 0.50 - 1.40 mg/dL    Calcium 8.2 (L) 8.5 - 10.5 mg/dL    AST(SGOT) 114 (H) 12 - 45 U/L    ALT(SGPT) 85 (H) 2 - 50 U/L    Alkaline Phosphatase 1725 (H) 30 - 99 U/L    Total Bilirubin 8.8 (H) 0.1 - 1.5 mg/dL    Albumin 2.4 (L) 3.2 - 4.9 g/dL    Total Protein 6.1 6.0 - 8.2 g/dL    Globulin 3.7 (H) 1.9 - 3.5 g/dL    A-G Ratio 0.6 g/dL   ESTIMATED GFR    Collection Time: 10/27/21  4:00 AM   Result Value Ref Range    GFR If  59 (A) >60 mL/min/1.73 m 2    GFR If Non African American 49 (A) >60 mL/min/1.73 m 2   POCT glucose device results    Collection Time: 10/27/21  4:50 AM   Result Value Ref Range    Glucose - Accu-Ck 442 (HH) 65 - 99 mg/dL     Medical Decision Making, by Problem:  Active Hospital Problems    Diagnosis    • Hypertension [I10]      Priority: Medium   • Chronic pain [G89.29]    • Cholecystitis [K81.9]    • Hepatitis [K75.9]    • Hyponatremia [E87.1]    • Anemia [D64.9]    • CAD S/P percutaneous coronary angioplasty [I25.10, Z98.61]    • Anxiety [F41.9]    • Uncontrolled type 1 diabetes mellitus (HCC) [E10.65]      ICD-10 transition       Plan:     surgical decision   Risks explained and accepted   Npo after midnight     30 minutes     Quality Measures:  Quality-Core Measures   Jacob catheter::  No Jacob  DVT prophylaxis pharmacological::  Contraindicated - High bleeding risk  Antibiotics:  Treating active infection/contamination beyond 24 hours perioperative coverage      Discussed patient condition with Patient

## 2021-10-27 NOTE — PROGRESS NOTES
Report received from RN, assumed care at 0700   Pt is A0X4, and responds appropriately   Pt declines any SOB, chest pain, new onset of numbness/ tingiling  Pt rates pain at 7/10, on a scale of 1-10, pt medicated per MAR  Pt is voiding adequatly and without hesitancy  Pt has + flatus, + bowel sounds, + BM on 10/26  Pt ambulates with a x1 assist, refusing bed alarm however is demonstrating compliance with using call light prior to OOB   Pt is tolerating a diet, pt denies any nausea/vomiting. NPO at 0000 for surgery 10/27  Plan of care discussed, all questions answered. Explained importance of calling before getting OOB and pt verbalizes understanding. Explained importance of oral care. Call light is within reach, treaded slipper socks on, bed in lowest/ locked position, hourly rounding in place, all needs met at this time

## 2021-10-27 NOTE — PROGRESS NOTES
[facility] Medicine Daily Progress Note    Date of Service  10/27/2021    Chief Complaint  Anu Manuel is a 66 y.o. female admitted 10/24/2021 with cholecystitis.    Hospital Course  Admitted with cholecystitis, suspected choledocholithiasis. Gastroenterology and surgery were both consulted on the case. Patient was started empiric coverage with IV Zosyn.  Patient with history of hepatitis, with unclear etiology. Previous work-up for autoimmune hepatitis was negative. She had a liver biopsy done which did show features of large bile duct obstruction. 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    Interval Problem Update  Cholecystitis - ERCP did not demonstrate any biliary stones, case discussed with Gastroenterology and Surgery  Hepatitis - LFTs remain elevated but stable  HTN - sbp 110-140  Diabetes - -400  RHEA - crea went up to 1.1    I have personally seen and examined the patient at bedside. I discussed the plan of care with patient, bedside RN, charge RN,  and general surgery and GI.    Consultants/Specialty  general surgery and GI    Code Status  DNAR/DNI    Disposition  Patient is not medically cleared.   Anticipate discharge to to home with close outpatient follow-up.  I have placed the appropriate orders for post-discharge needs.    Review of Systems  Review of Systems   Constitutional: Positive for malaise/fatigue. Negative for chills, diaphoresis and fever.   HENT: Negative for congestion, hearing loss and sore throat.    Eyes: Negative for blurred vision.   Respiratory: Negative for cough, shortness of breath and wheezing.    Cardiovascular: Negative for chest pain, palpitations and leg swelling.   Gastrointestinal: Positive for abdominal pain and nausea. Negative for diarrhea, heartburn and vomiting.   Genitourinary: Negative for dysuria, flank pain and hematuria.    Musculoskeletal: Negative for back pain, joint pain, myalgias and neck pain.   Skin: Negative for rash.   Neurological: Positive for weakness. Negative for dizziness, sensory change, speech change, focal weakness and headaches.   Psychiatric/Behavioral: The patient is nervous/anxious.         Physical Exam  Temp:  [36.8 °C (98.2 °F)-37.3 °C (99.1 °F)] 37.2 °C (99 °F)  Pulse:  [61-71] 68  Resp:  [16-18] 18  BP: (111-143)/(55-73) 111/59  SpO2:  [90 %-96 %] 90 %    Physical Exam  Vitals and nursing note reviewed.   HENT:      Head: Normocephalic and atraumatic.      Nose: No congestion.      Mouth/Throat:      Mouth: Mucous membranes are moist.   Eyes:      General: Scleral icterus present.      Extraocular Movements: Extraocular movements intact.      Conjunctiva/sclera: Conjunctivae normal.   Cardiovascular:      Rate and Rhythm: Normal rate and regular rhythm.   Pulmonary:      Effort: Pulmonary effort is normal.      Breath sounds: Normal breath sounds.   Abdominal:      General: There is no distension.      Tenderness: There is abdominal tenderness (RUQ). There is no guarding or rebound.   Musculoskeletal:      Cervical back: No tenderness.      Right lower leg: No edema.      Left lower leg: No edema.   Skin:     Coloration: Skin is jaundiced.   Neurological:      General: No focal deficit present.      Mental Status: She is alert and oriented to person, place, and time.      Cranial Nerves: No cranial nerve deficit.   Psychiatric:         Mood and Affect: Mood is anxious.         Fluids    Intake/Output Summary (Last 24 hours) at 10/27/2021 1350  Last data filed at 10/27/2021 1200  Gross per 24 hour   Intake 360 ml   Output --   Net 360 ml       Laboratory  Recent Labs     10/24/21  2256 10/26/21  0308 10/27/21  0400   WBC 11.6* 10.0 8.6   RBC 2.82* 2.38* 2.44*   HEMOGLOBIN 9.4* 8.0* 8.1*   HEMATOCRIT 29.8* 24.5* 26.4*   .7* 102.9* 108.2*   MCH 33.3* 33.6* 33.2*   MCHC 31.5* 32.7* 30.7*   RDW 64.6*  62.4* 63.6*   PLATELETCT 387 324 339   MPV 11.5 11.5 12.0     Recent Labs     10/24/21  2256 10/26/21  0308 10/27/21  0400   SODIUM 128* 135 128*   POTASSIUM 3.8 3.6 4.6   CHLORIDE 93* 102 94*   CO2 22 22 22   GLUCOSE 289* 41* 464*   BUN 13 12 17   CREATININE 0.81 0.94 1.12   CALCIUM 8.8 8.4* 8.2*     Recent Labs     10/26/21  0308   INR 1.11               Imaging  [id number] STENT - TUBE CHANGE   Final Result      Digitized intraoperative radiograph is submitted for review.  This examination is not for diagnostic purpose but for guidance during a surgical procedure.      US-RUQ   Final Result         1.  Gallbladder sludge and cholelithiasis with gallbladder wall thickening and positive sonographic Cifuentes's sign, findings sonographically compatible with cholecystitis.   2.  Hepatomegaly and echogenic liver, compatible with fatty change versus fibrosis.           Assessment/Plan  * Cholecystitis- (present on admission)  Assessment & Plan  IV Zosyn  ERCP with sphincterotomy and biliary stent placement 10/26/2021  Pain control  Plan for Cholecystectomy tomorrow    Hepatitis- (present on admission)  Assessment & Plan  Unclear etiology  Ursodiol  Gastroenterology following    Chronic pain- (present on admission)  Assessment & Plan  Pain control    Hyponatremia- (present on admission)  Assessment & Plan  Follow cmp    Anemia- (present on admission)  Assessment & Plan  Follow cbc    CAD S/P percutaneous coronary angioplasty- (present on admission)  Assessment & Plan  Aspirin, Zetia, Metoprolol.      Anxiety- (present on admission)  Assessment & Plan  Zoloft, Xanax    Hypertension- (present on admission)  Assessment & Plan  Amlodipine, Metoprolol    RHEA (acute kidney injury) (HCC)- (present on admission)  Assessment & Plan  Start IVF hydration  Follow bmp    Uncontrolled type 1 diabetes mellitus (HCC)- (present on admission)  Assessment & Plan  Hold Semglee  Adjust SSI       VTE prophylaxis: SCDs/TEDs    I have  performed a physical exam and reviewed and updated ROS and Plan today (10/27/2021). In review of yesterday's note (10/26/2021), there are no changes except as documented above.

## 2021-10-27 NOTE — PROGRESS NOTES
"Acute Care Surgery    Admitted:  cholecystitis with cholelithiasis and choledocholithiasis.     /59   Pulse 68   Temp 37.2 °C (99 °F) (Temporal)   Resp 18   Ht 1.676 m (5' 6\")   Wt 65.8 kg (145 lb)   SpO2 90%      Total bili 8.8, Alk phos 1725.     Post op day # 1 ERCP with stent placement.     (O)     No acute distress.  Abdominal tenderness.  Icterus / Jaundice.     (A/P)    - Surgical Candidate, Cholecystectomy.  - Counseled.        "

## 2021-10-27 NOTE — CARE PLAN
Problem: Knowledge Deficit - Standard  Goal: Patient and family/care givers will demonstrate understanding of plan of care, disease process/condition, diagnostic tests and medications  Outcome: Progressing  Note: Patient educated on POC, verbalizes and demonstrates understanding. Pt understands reasoning for NPO status     Problem: Pain - Standard  Goal: Alleviation of pain or a reduction in pain to the patient’s comfort goal  Outcome: Progressing  Note: Patient frequently reports high levels of pain, medicated per MAR     Problem: Fall Risk  Goal: Patient will remain free from falls  Outcome: Progressing  Note: High fall risk interventions in place. Pt educated on interventions and is compliant   The patient is Watcher - Medium risk of patient condition declining or worsening    Shift Goals  Clinical Goals: Pain control, behavioral compliance, safety  Patient Goals: Pain control, rest  Family Goals: No family present    Progress made toward(s) clinical / shift goals:  Monitoring Fsbg levels and administering pharmacological interventions as ordered     Patient is not progressing towards the following goals:

## 2021-10-27 NOTE — CARE PLAN
The patient is Watcher - Medium risk of patient condition declining or worsening    Shift Goals  Clinical Goals: pain management, anxiety reduction  Patient Goals: Pain control, rest  Family Goals: No family present    Progress made toward(s) clinical / shift goals:    Problem: Knowledge Deficit - Standard  Goal: Patient and family/care givers will demonstrate understanding of plan of care, disease process/condition, diagnostic tests and medications  Outcome: Progressing     Problem: Pain - Standard  Goal: Alleviation of pain or a reduction in pain to the patient’s comfort goal  Outcome: Progressing     Problem: Fall Risk  Goal: Patient will remain free from falls  Outcome: Progressing       Patient is not progressing towards the following goals:

## 2021-10-28 ENCOUNTER — ANESTHESIA (OUTPATIENT)
Dept: SURGERY | Facility: MEDICAL CENTER | Age: 66
DRG: 418 | End: 2021-10-28
Payer: MEDICARE

## 2021-10-28 LAB
ABO GROUP BLD: NORMAL
ALBUMIN SERPL BCP-MCNC: 2.4 G/DL (ref 3.2–4.9)
ALBUMIN/GLOB SERPL: 0.6 G/DL
ALP SERPL-CCNC: 1692 U/L (ref 30–99)
ALT SERPL-CCNC: 89 U/L (ref 2–50)
ANION GAP SERPL CALC-SCNC: 12 MMOL/L (ref 7–16)
AST SERPL-CCNC: 136 U/L (ref 12–45)
BARCODED ABORH UBTYP: 6200
BARCODED PRD CODE UBPRD: NORMAL
BARCODED UNIT NUM UBUNT: NORMAL
BASOPHILS # BLD AUTO: 0.1 % (ref 0–1.8)
BASOPHILS # BLD AUTO: 0.7 % (ref 0–1.8)
BASOPHILS # BLD: 0.01 K/UL (ref 0–0.12)
BASOPHILS # BLD: 0.06 K/UL (ref 0–0.12)
BILIRUB SERPL-MCNC: 9.5 MG/DL (ref 0.1–1.5)
BLD GP AB SCN SERPL QL: NORMAL
BUN SERPL-MCNC: 21 MG/DL (ref 8–22)
CALCIUM SERPL-MCNC: 8.1 MG/DL (ref 8.5–10.5)
CHLORIDE SERPL-SCNC: 98 MMOL/L (ref 96–112)
CO2 SERPL-SCNC: 21 MMOL/L (ref 20–33)
COMPONENT R 8504R: NORMAL
CREAT SERPL-MCNC: 1.21 MG/DL (ref 0.5–1.4)
EOSINOPHIL # BLD AUTO: 0.06 K/UL (ref 0–0.51)
EOSINOPHIL # BLD AUTO: 0.48 K/UL (ref 0–0.51)
EOSINOPHIL NFR BLD: 0.9 % (ref 0–6.9)
EOSINOPHIL NFR BLD: 5.6 % (ref 0–6.9)
ERYTHROCYTE [DISTWIDTH] IN BLOOD BY AUTOMATED COUNT: 62.7 FL (ref 35.9–50)
ERYTHROCYTE [DISTWIDTH] IN BLOOD BY AUTOMATED COUNT: 63.7 FL (ref 35.9–50)
GLOBULIN SER CALC-MCNC: 3.7 G/DL (ref 1.9–3.5)
GLUCOSE BLD-MCNC: 177 MG/DL (ref 65–99)
GLUCOSE BLD-MCNC: 177 MG/DL (ref 65–99)
GLUCOSE BLD-MCNC: 308 MG/DL (ref 65–99)
GLUCOSE SERPL-MCNC: 176 MG/DL (ref 65–99)
HCT VFR BLD AUTO: 20.2 % (ref 37–47)
HCT VFR BLD AUTO: 25 % (ref 37–47)
HGB BLD-MCNC: 6.2 G/DL (ref 12–16)
HGB BLD-MCNC: 7.7 G/DL (ref 12–16)
IMM GRANULOCYTES # BLD AUTO: 0.05 K/UL (ref 0–0.11)
IMM GRANULOCYTES # BLD AUTO: 0.06 K/UL (ref 0–0.11)
IMM GRANULOCYTES NFR BLD AUTO: 0.7 % (ref 0–0.9)
IMM GRANULOCYTES NFR BLD AUTO: 0.7 % (ref 0–0.9)
LIPASE SERPL-CCNC: 6 U/L (ref 11–82)
LYMPHOCYTES # BLD AUTO: 0.46 K/UL (ref 1–4.8)
LYMPHOCYTES # BLD AUTO: 0.99 K/UL (ref 1–4.8)
LYMPHOCYTES NFR BLD: 11.6 % (ref 22–41)
LYMPHOCYTES NFR BLD: 6.6 % (ref 22–41)
MCH RBC QN AUTO: 32.8 PG (ref 27–33)
MCH RBC QN AUTO: 32.8 PG (ref 27–33)
MCHC RBC AUTO-ENTMCNC: 30.7 G/DL (ref 33.6–35)
MCHC RBC AUTO-ENTMCNC: 30.8 G/DL (ref 33.6–35)
MCV RBC AUTO: 106.4 FL (ref 81.4–97.8)
MCV RBC AUTO: 106.9 FL (ref 81.4–97.8)
MONOCYTES # BLD AUTO: 0.37 K/UL (ref 0–0.85)
MONOCYTES # BLD AUTO: 0.88 K/UL (ref 0–0.85)
MONOCYTES NFR BLD AUTO: 10.3 % (ref 0–13.4)
MONOCYTES NFR BLD AUTO: 5.3 % (ref 0–13.4)
NEUTROPHILS # BLD AUTO: 6.02 K/UL (ref 2–7.15)
NEUTROPHILS # BLD AUTO: 6.1 K/UL (ref 2–7.15)
NEUTROPHILS NFR BLD: 71.1 % (ref 44–72)
NEUTROPHILS NFR BLD: 86.4 % (ref 44–72)
NRBC # BLD AUTO: 0 K/UL
NRBC # BLD AUTO: 0 K/UL
NRBC BLD-RTO: 0 /100 WBC
NRBC BLD-RTO: 0 /100 WBC
PATHOLOGY CONSULT NOTE: NORMAL
PLATELET # BLD AUTO: 292 K/UL (ref 164–446)
PLATELET # BLD AUTO: 328 K/UL (ref 164–446)
PMV BLD AUTO: 11.8 FL (ref 9–12.9)
PMV BLD AUTO: 11.9 FL (ref 9–12.9)
POTASSIUM SERPL-SCNC: 4.2 MMOL/L (ref 3.6–5.5)
PRODUCT TYPE UPROD: NORMAL
PROT SERPL-MCNC: 6.1 G/DL (ref 6–8.2)
RBC # BLD AUTO: 1.89 M/UL (ref 4.2–5.4)
RBC # BLD AUTO: 2.35 M/UL (ref 4.2–5.4)
RH BLD: NORMAL
SODIUM SERPL-SCNC: 131 MMOL/L (ref 135–145)
UNIT STATUS USTAT: NORMAL
WBC # BLD AUTO: 7 K/UL (ref 4.8–10.8)
WBC # BLD AUTO: 8.6 K/UL (ref 4.8–10.8)

## 2021-10-28 PROCEDURE — A9270 NON-COVERED ITEM OR SERVICE: HCPCS | Performed by: STUDENT IN AN ORGANIZED HEALTH CARE EDUCATION/TRAINING PROGRAM

## 2021-10-28 PROCEDURE — 700111 HCHG RX REV CODE 636 W/ 250 OVERRIDE (IP): Performed by: STUDENT IN AN ORGANIZED HEALTH CARE EDUCATION/TRAINING PROGRAM

## 2021-10-28 PROCEDURE — 700102 HCHG RX REV CODE 250 W/ 637 OVERRIDE(OP): Performed by: STUDENT IN AN ORGANIZED HEALTH CARE EDUCATION/TRAINING PROGRAM

## 2021-10-28 PROCEDURE — 160035 HCHG PACU - 1ST 60 MINS PHASE I: Performed by: SURGERY

## 2021-10-28 PROCEDURE — 30233N1 TRANSFUSION OF NONAUTOLOGOUS RED BLOOD CELLS INTO PERIPHERAL VEIN, PERCUTANEOUS APPROACH: ICD-10-PCS | Performed by: INTERNAL MEDICINE

## 2021-10-28 PROCEDURE — 99232 SBSQ HOSP IP/OBS MODERATE 35: CPT | Mod: 57 | Performed by: SURGERY

## 2021-10-28 PROCEDURE — 501399 HCHG SPECIMAN BAG, ENDO CATC: Performed by: SURGERY

## 2021-10-28 PROCEDURE — 500002 HCHG ADHESIVE, DERMABOND: Performed by: SURGERY

## 2021-10-28 PROCEDURE — 700102 HCHG RX REV CODE 250 W/ 637 OVERRIDE(OP): Performed by: FAMILY MEDICINE

## 2021-10-28 PROCEDURE — 86901 BLOOD TYPING SEROLOGIC RH(D): CPT

## 2021-10-28 PROCEDURE — A9270 NON-COVERED ITEM OR SERVICE: HCPCS | Performed by: ANESTHESIOLOGY

## 2021-10-28 PROCEDURE — 82962 GLUCOSE BLOOD TEST: CPT

## 2021-10-28 PROCEDURE — 160002 HCHG RECOVERY MINUTES (STAT): Performed by: SURGERY

## 2021-10-28 PROCEDURE — 700101 HCHG RX REV CODE 250: Performed by: SURGERY

## 2021-10-28 PROCEDURE — 36430 TRANSFUSION BLD/BLD COMPNT: CPT

## 2021-10-28 PROCEDURE — 86900 BLOOD TYPING SEROLOGIC ABO: CPT

## 2021-10-28 PROCEDURE — 501583 HCHG TROCAR, THRD CAN&SEAL 5X100: Performed by: SURGERY

## 2021-10-28 PROCEDURE — A9270 NON-COVERED ITEM OR SERVICE: HCPCS | Performed by: FAMILY MEDICINE

## 2021-10-28 PROCEDURE — 770006 HCHG ROOM/CARE - MED/SURG/GYN SEMI*

## 2021-10-28 PROCEDURE — 501338 HCHG SHEARS, ENDO: Performed by: SURGERY

## 2021-10-28 PROCEDURE — 500378 HCHG DRAIN, J-VAC ROUND 19FR: Performed by: SURGERY

## 2021-10-28 PROCEDURE — 700101 HCHG RX REV CODE 250: Performed by: ANESTHESIOLOGY

## 2021-10-28 PROCEDURE — 47562 LAPAROSCOPIC CHOLECYSTECTOMY: CPT | Performed by: SURGERY

## 2021-10-28 PROCEDURE — 86850 RBC ANTIBODY SCREEN: CPT

## 2021-10-28 PROCEDURE — 500868 HCHG NEEDLE, SURGI(VARES): Performed by: SURGERY

## 2021-10-28 PROCEDURE — 700105 HCHG RX REV CODE 258: Performed by: FAMILY MEDICINE

## 2021-10-28 PROCEDURE — 700105 HCHG RX REV CODE 258: Performed by: ANESTHESIOLOGY

## 2021-10-28 PROCEDURE — 160036 HCHG PACU - EA ADDL 30 MINS PHASE I: Performed by: SURGERY

## 2021-10-28 PROCEDURE — 501838 HCHG SUTURE GENERAL: Performed by: SURGERY

## 2021-10-28 PROCEDURE — 88304 TISSUE EXAM BY PATHOLOGIST: CPT

## 2021-10-28 PROCEDURE — 86923 COMPATIBILITY TEST ELECTRIC: CPT

## 2021-10-28 PROCEDURE — 700105 HCHG RX REV CODE 258: Performed by: STUDENT IN AN ORGANIZED HEALTH CARE EDUCATION/TRAINING PROGRAM

## 2021-10-28 PROCEDURE — 700111 HCHG RX REV CODE 636 W/ 250 OVERRIDE (IP): Performed by: FAMILY MEDICINE

## 2021-10-28 PROCEDURE — 501577 HCHG TROCAR, STEP 11MM: Performed by: SURGERY

## 2021-10-28 PROCEDURE — 0FT44ZZ RESECTION OF GALLBLADDER, PERCUTANEOUS ENDOSCOPIC APPROACH: ICD-10-PCS | Performed by: SURGERY

## 2021-10-28 PROCEDURE — 160029 HCHG SURGERY MINUTES - 1ST 30 MINS LEVEL 4: Performed by: SURGERY

## 2021-10-28 PROCEDURE — 700111 HCHG RX REV CODE 636 W/ 250 OVERRIDE (IP): Performed by: ANESTHESIOLOGY

## 2021-10-28 PROCEDURE — 36415 COLL VENOUS BLD VENIPUNCTURE: CPT

## 2021-10-28 PROCEDURE — 502571 HCHG PACK, LAP CHOLE: Performed by: SURGERY

## 2021-10-28 PROCEDURE — 85025 COMPLETE CBC W/AUTO DIFF WBC: CPT

## 2021-10-28 PROCEDURE — 160048 HCHG OR STATISTICAL LEVEL 1-5: Performed by: SURGERY

## 2021-10-28 PROCEDURE — 500800 HCHG LAPAROSCOPIC J/L HOOK: Performed by: SURGERY

## 2021-10-28 PROCEDURE — 80053 COMPREHEN METABOLIC PANEL: CPT

## 2021-10-28 PROCEDURE — 501570 HCHG TROCAR, SEPARATOR: Performed by: SURGERY

## 2021-10-28 PROCEDURE — 160009 HCHG ANES TIME/MIN: Performed by: SURGERY

## 2021-10-28 PROCEDURE — 700102 HCHG RX REV CODE 250 W/ 637 OVERRIDE(OP): Performed by: ANESTHESIOLOGY

## 2021-10-28 PROCEDURE — 83690 ASSAY OF LIPASE: CPT

## 2021-10-28 PROCEDURE — 160041 HCHG SURGERY MINUTES - EA ADDL 1 MIN LEVEL 4: Performed by: SURGERY

## 2021-10-28 PROCEDURE — P9016 RBC LEUKOCYTES REDUCED: HCPCS

## 2021-10-28 PROCEDURE — 99232 SBSQ HOSP IP/OBS MODERATE 35: CPT | Performed by: FAMILY MEDICINE

## 2021-10-28 RX ORDER — PHENYLEPHRINE HYDROCHLORIDE 10 MG/ML
INJECTION, SOLUTION INTRAMUSCULAR; INTRAVENOUS; SUBCUTANEOUS PRN
Status: DISCONTINUED | OUTPATIENT
Start: 2021-10-28 | End: 2021-10-28 | Stop reason: SURG

## 2021-10-28 RX ORDER — HYDROMORPHONE HYDROCHLORIDE 1 MG/ML
0.2 INJECTION, SOLUTION INTRAMUSCULAR; INTRAVENOUS; SUBCUTANEOUS
Status: DISCONTINUED | OUTPATIENT
Start: 2021-10-28 | End: 2021-10-28 | Stop reason: HOSPADM

## 2021-10-28 RX ORDER — HALOPERIDOL 5 MG/ML
1 INJECTION INTRAMUSCULAR
Status: DISCONTINUED | OUTPATIENT
Start: 2021-10-28 | End: 2021-10-28 | Stop reason: HOSPADM

## 2021-10-28 RX ORDER — LABETALOL HYDROCHLORIDE 5 MG/ML
5 INJECTION, SOLUTION INTRAVENOUS
Status: DISCONTINUED | OUTPATIENT
Start: 2021-10-28 | End: 2021-10-28 | Stop reason: HOSPADM

## 2021-10-28 RX ORDER — DEXTROSE MONOHYDRATE 25 G/50ML
50 INJECTION, SOLUTION INTRAVENOUS
Status: DISCONTINUED | OUTPATIENT
Start: 2021-10-28 | End: 2021-10-28 | Stop reason: HOSPADM

## 2021-10-28 RX ORDER — CEFOTETAN DISODIUM 2 G/20ML
INJECTION, POWDER, FOR SOLUTION INTRAMUSCULAR; INTRAVENOUS PRN
Status: DISCONTINUED | OUTPATIENT
Start: 2021-10-28 | End: 2021-10-28 | Stop reason: SURG

## 2021-10-28 RX ORDER — HYDROMORPHONE HYDROCHLORIDE 1 MG/ML
0.1 INJECTION, SOLUTION INTRAMUSCULAR; INTRAVENOUS; SUBCUTANEOUS
Status: DISCONTINUED | OUTPATIENT
Start: 2021-10-28 | End: 2021-10-28 | Stop reason: HOSPADM

## 2021-10-28 RX ORDER — ONDANSETRON 2 MG/ML
4 INJECTION INTRAMUSCULAR; INTRAVENOUS
Status: DISCONTINUED | OUTPATIENT
Start: 2021-10-28 | End: 2021-10-28 | Stop reason: HOSPADM

## 2021-10-28 RX ORDER — METOPROLOL SUCCINATE 100 MG/1
100 TABLET, EXTENDED RELEASE ORAL DAILY
Status: DISCONTINUED | OUTPATIENT
Start: 2021-10-29 | End: 2021-11-03 | Stop reason: HOSPADM

## 2021-10-28 RX ORDER — SODIUM CHLORIDE, SODIUM LACTATE, POTASSIUM CHLORIDE, CALCIUM CHLORIDE 600; 310; 30; 20 MG/100ML; MG/100ML; MG/100ML; MG/100ML
INJECTION, SOLUTION INTRAVENOUS CONTINUOUS
Status: DISCONTINUED | OUTPATIENT
Start: 2021-10-28 | End: 2021-10-28 | Stop reason: HOSPADM

## 2021-10-28 RX ORDER — OXYCODONE HCL 5 MG/5 ML
5 SOLUTION, ORAL ORAL
Status: COMPLETED | OUTPATIENT
Start: 2021-10-28 | End: 2021-10-28

## 2021-10-28 RX ORDER — ROCURONIUM BROMIDE 10 MG/ML
INJECTION, SOLUTION INTRAVENOUS PRN
Status: DISCONTINUED | OUTPATIENT
Start: 2021-10-28 | End: 2021-10-28 | Stop reason: SURG

## 2021-10-28 RX ORDER — DEXAMETHASONE SODIUM PHOSPHATE 4 MG/ML
INJECTION, SOLUTION INTRA-ARTICULAR; INTRALESIONAL; INTRAMUSCULAR; INTRAVENOUS; SOFT TISSUE PRN
Status: DISCONTINUED | OUTPATIENT
Start: 2021-10-28 | End: 2021-10-28 | Stop reason: SURG

## 2021-10-28 RX ORDER — HYDROMORPHONE HYDROCHLORIDE 1 MG/ML
0.4 INJECTION, SOLUTION INTRAMUSCULAR; INTRAVENOUS; SUBCUTANEOUS
Status: DISCONTINUED | OUTPATIENT
Start: 2021-10-28 | End: 2021-10-28 | Stop reason: HOSPADM

## 2021-10-28 RX ORDER — DIPHENHYDRAMINE HYDROCHLORIDE 50 MG/ML
12.5 INJECTION INTRAMUSCULAR; INTRAVENOUS
Status: DISCONTINUED | OUTPATIENT
Start: 2021-10-28 | End: 2021-10-28 | Stop reason: HOSPADM

## 2021-10-28 RX ORDER — SUCCINYLCHOLINE CHLORIDE 20 MG/ML
INJECTION INTRAMUSCULAR; INTRAVENOUS PRN
Status: DISCONTINUED | OUTPATIENT
Start: 2021-10-28 | End: 2021-10-28 | Stop reason: SURG

## 2021-10-28 RX ORDER — BUPIVACAINE HYDROCHLORIDE AND EPINEPHRINE 5; 5 MG/ML; UG/ML
INJECTION, SOLUTION EPIDURAL; INTRACAUDAL; PERINEURAL
Status: DISCONTINUED | OUTPATIENT
Start: 2021-10-28 | End: 2021-10-28 | Stop reason: HOSPADM

## 2021-10-28 RX ORDER — HYDRALAZINE HYDROCHLORIDE 20 MG/ML
5 INJECTION INTRAMUSCULAR; INTRAVENOUS
Status: DISCONTINUED | OUTPATIENT
Start: 2021-10-28 | End: 2021-10-28 | Stop reason: HOSPADM

## 2021-10-28 RX ORDER — ONDANSETRON 2 MG/ML
INJECTION INTRAMUSCULAR; INTRAVENOUS PRN
Status: DISCONTINUED | OUTPATIENT
Start: 2021-10-28 | End: 2021-10-28 | Stop reason: SURG

## 2021-10-28 RX ORDER — GLYCOPYRROLATE 0.2 MG/ML
INJECTION INTRAMUSCULAR; INTRAVENOUS PRN
Status: DISCONTINUED | OUTPATIENT
Start: 2021-10-28 | End: 2021-10-28 | Stop reason: SURG

## 2021-10-28 RX ORDER — MEPERIDINE HYDROCHLORIDE 25 MG/ML
5 INJECTION INTRAMUSCULAR; INTRAVENOUS; SUBCUTANEOUS
Status: DISCONTINUED | OUTPATIENT
Start: 2021-10-28 | End: 2021-10-28 | Stop reason: HOSPADM

## 2021-10-28 RX ORDER — SODIUM CHLORIDE, SODIUM LACTATE, POTASSIUM CHLORIDE, CALCIUM CHLORIDE 600; 310; 30; 20 MG/100ML; MG/100ML; MG/100ML; MG/100ML
INJECTION, SOLUTION INTRAVENOUS
Status: DISCONTINUED | OUTPATIENT
Start: 2021-10-28 | End: 2021-10-28 | Stop reason: SURG

## 2021-10-28 RX ADMIN — FENTANYL CITRATE 50 MCG: 50 INJECTION, SOLUTION INTRAMUSCULAR; INTRAVENOUS at 13:14

## 2021-10-28 RX ADMIN — PROPOFOL 150 MG: 10 INJECTION, EMULSION INTRAVENOUS at 11:17

## 2021-10-28 RX ADMIN — TRAZODONE HYDROCHLORIDE 100 MG: 50 TABLET ORAL at 19:51

## 2021-10-28 RX ADMIN — INSULIN LISPRO 6 UNITS: 100 INJECTION, SOLUTION INTRAVENOUS; SUBCUTANEOUS at 23:54

## 2021-10-28 RX ADMIN — ALPRAZOLAM 0.25 MG: 0.25 TABLET ORAL at 19:51

## 2021-10-28 RX ADMIN — PHENYLEPHRINE HYDROCHLORIDE 100 MCG: 10 INJECTION INTRAVENOUS at 11:40

## 2021-10-28 RX ADMIN — PIPERACILLIN SODIUM AND TAZOBACTAM SODIUM 3.38 G: 3; .375 INJECTION, POWDER, LYOPHILIZED, FOR SOLUTION INTRAVENOUS at 21:48

## 2021-10-28 RX ADMIN — SODIUM CHLORIDE, POTASSIUM CHLORIDE, SODIUM LACTATE AND CALCIUM CHLORIDE: 600; 310; 30; 20 INJECTION, SOLUTION INTRAVENOUS at 11:10

## 2021-10-28 RX ADMIN — INSULIN LISPRO 2 UNITS: 100 INJECTION, SOLUTION INTRAVENOUS; SUBCUTANEOUS at 04:32

## 2021-10-28 RX ADMIN — OXYCODONE HYDROCHLORIDE 10 MG: 10 TABLET ORAL at 17:59

## 2021-10-28 RX ADMIN — LEVOTHYROXINE SODIUM 225 MCG: 0.12 TABLET ORAL at 04:23

## 2021-10-28 RX ADMIN — FENTANYL CITRATE 50 MCG: 50 INJECTION, SOLUTION INTRAMUSCULAR; INTRAVENOUS at 13:25

## 2021-10-28 RX ADMIN — ROCURONIUM BROMIDE 10 MG: 10 INJECTION, SOLUTION INTRAVENOUS at 11:21

## 2021-10-28 RX ADMIN — FENTANYL CITRATE 100 MCG: 50 INJECTION, SOLUTION INTRAMUSCULAR; INTRAVENOUS at 12:02

## 2021-10-28 RX ADMIN — DOCUSATE SODIUM 50 MG AND SENNOSIDES 8.6 MG 2 TABLET: 8.6; 5 TABLET, FILM COATED ORAL at 17:52

## 2021-10-28 RX ADMIN — ROCURONIUM BROMIDE 5 MG: 10 INJECTION, SOLUTION INTRAVENOUS at 12:17

## 2021-10-28 RX ADMIN — INSULIN LISPRO 8 UNITS: 100 INJECTION, SOLUTION INTRAVENOUS; SUBCUTANEOUS at 17:56

## 2021-10-28 RX ADMIN — SERTRALINE HYDROCHLORIDE 100 MG: 100 TABLET, FILM COATED ORAL at 17:51

## 2021-10-28 RX ADMIN — METOPROLOL SUCCINATE 200 MG: 100 TABLET, EXTENDED RELEASE ORAL at 04:23

## 2021-10-28 RX ADMIN — DOCUSATE SODIUM 50 MG AND SENNOSIDES 8.6 MG 2 TABLET: 8.6; 5 TABLET, FILM COATED ORAL at 04:23

## 2021-10-28 RX ADMIN — AMLODIPINE BESYLATE 5 MG: 10 TABLET ORAL at 17:51

## 2021-10-28 RX ADMIN — ONDANSETRON 4 MG: 2 INJECTION INTRAMUSCULAR; INTRAVENOUS at 11:47

## 2021-10-28 RX ADMIN — DEXAMETHASONE SODIUM PHOSPHATE 4 MG: 4 INJECTION, SOLUTION INTRA-ARTICULAR; INTRALESIONAL; INTRAMUSCULAR; INTRAVENOUS; SOFT TISSUE at 11:21

## 2021-10-28 RX ADMIN — HYDROMORPHONE HYDROCHLORIDE 0.5 MG: 1 INJECTION, SOLUTION INTRAMUSCULAR; INTRAVENOUS; SUBCUTANEOUS at 21:47

## 2021-10-28 RX ADMIN — OXYCODONE HYDROCHLORIDE 5 MG: 5 SOLUTION ORAL at 13:13

## 2021-10-28 RX ADMIN — PIPERACILLIN SODIUM AND TAZOBACTAM SODIUM 3.38 G: 3; .375 INJECTION, POWDER, LYOPHILIZED, FOR SOLUTION INTRAVENOUS at 04:22

## 2021-10-28 RX ADMIN — SUCCINYLCHOLINE CHLORIDE 100 MG: 20 INJECTION, SOLUTION INTRAMUSCULAR; INTRAVENOUS; PARENTERAL at 11:18

## 2021-10-28 RX ADMIN — GLYCOPYRROLATE 0.2 MG: 0.2 INJECTION INTRAMUSCULAR; INTRAVENOUS at 11:40

## 2021-10-28 RX ADMIN — SODIUM CHLORIDE, POTASSIUM CHLORIDE, SODIUM LACTATE AND CALCIUM CHLORIDE: 600; 310; 30; 20 INJECTION, SOLUTION INTRAVENOUS at 12:39

## 2021-10-28 RX ADMIN — CEFOTETAN DISODIUM 2 G: 2 INJECTION, POWDER, FOR SOLUTION INTRAMUSCULAR; INTRAVENOUS at 11:19

## 2021-10-28 RX ADMIN — OMEPRAZOLE 20 MG: 20 CAPSULE, DELAYED RELEASE ORAL at 04:23

## 2021-10-28 RX ADMIN — HYDROMORPHONE HYDROCHLORIDE 0.5 MG: 1 INJECTION, SOLUTION INTRAMUSCULAR; INTRAVENOUS; SUBCUTANEOUS at 14:37

## 2021-10-28 RX ADMIN — PIPERACILLIN SODIUM AND TAZOBACTAM SODIUM 3.38 G: 3; .375 INJECTION, POWDER, LYOPHILIZED, FOR SOLUTION INTRAVENOUS at 14:40

## 2021-10-28 RX ADMIN — PHENYLEPHRINE HYDROCHLORIDE 100 MCG: 10 INJECTION INTRAVENOUS at 11:32

## 2021-10-28 RX ADMIN — FENTANYL CITRATE 100 MCG: 50 INJECTION, SOLUTION INTRAMUSCULAR; INTRAVENOUS at 12:19

## 2021-10-28 RX ADMIN — URSODIOL 300 MG: 300 CAPSULE ORAL at 04:23

## 2021-10-28 RX ADMIN — SUGAMMADEX 200 MG: 100 INJECTION, SOLUTION INTRAVENOUS at 12:39

## 2021-10-28 RX ADMIN — HYDROMORPHONE HYDROCHLORIDE 0.5 MG: 1 INJECTION, SOLUTION INTRAMUSCULAR; INTRAVENOUS; SUBCUTANEOUS at 04:22

## 2021-10-28 RX ADMIN — ALPRAZOLAM 0.25 MG: 0.25 TABLET ORAL at 08:23

## 2021-10-28 RX ADMIN — FENTANYL CITRATE 100 MCG: 50 INJECTION, SOLUTION INTRAMUSCULAR; INTRAVENOUS at 11:17

## 2021-10-28 ASSESSMENT — ENCOUNTER SYMPTOMS
CHILLS: 0
WEAKNESS: 1
NERVOUS/ANXIOUS: 1
BACK PAIN: 0
NECK PAIN: 0
VOMITING: 0
PALPITATIONS: 0
DIZZINESS: 0
ABDOMINAL PAIN: 1
SHORTNESS OF BREATH: 0
HEADACHES: 0
SENSORY CHANGE: 0
SORE THROAT: 0
MYALGIAS: 0
SPEECH CHANGE: 0
COUGH: 0
FEVER: 0
NAUSEA: 0
DIAPHORESIS: 0
DIARRHEA: 0
WHEEZING: 0
HEARTBURN: 0
FOCAL WEAKNESS: 0
FLANK PAIN: 0
BLURRED VISION: 0

## 2021-10-28 ASSESSMENT — PAIN DESCRIPTION - PAIN TYPE
TYPE: SURGICAL PAIN
TYPE: ACUTE PAIN;SURGICAL PAIN
TYPE: SURGICAL PAIN

## 2021-10-28 ASSESSMENT — PAIN SCALES - GENERAL: PAIN_LEVEL: 6

## 2021-10-28 NOTE — OP REPORT
DATE OF SERVICE:  10/28/2021     PREOPERATIVE DIAGNOSES:  Chronic cholecystitis plus stone.     POSTOPERATIVE DIAGNOSES:  Chronic cholecystitis plus stone plus hepatic   congestion.     OPERATION:  Laparoscopic cholecystectomy.     SURGEON:  Tello Trammell MD     ANESTHESIOLOGIST:  Flavio Early MD     ANESTHESIA:  General anesthesia.     OPERATIVE NOTE:  The patient, 66 years of age, has a complex presentation.    She has been having right upper quadrant abdominal pain.  She has been   evaluated for the last month.  The underlying etiology of elevated liver   function tests with rising bilirubin has not been completely defined.  The   patient is known to be diabetic.  She does have gallbladder initially thought   to have acute cholecystitis with ERCP showed a patent cystic duct.    Nevertheless, she has been having postprandial epigastric and right upper   quadrant abdominal pain and in the light of diabetes despite potentially   elevated risk, she was felt to be a candidate for laparoscopic   cholecystectomy.  The risks, possible complications of such operation were   carefully explained to her in detail.  She hope for clinical improvement.  It   also would give us an opportunity to see if there was any other visualized   abnormalities in the abdomen.  The patient's preoperative evaluation was   satisfactory, though she was borderline anemic.  The patient did get typed and   crossed in the event of blood transfusion would be required.  The patient   consented to surgery and anesthesia, received prophylactic antibiotics, was   taken to the operating room and placed under anesthesia by Dr. Early. Once   anesthetized, she remained stable throughout surgery.  The patient's abdomen   was prepped with ChloraPrep solution, sterile drapes were applied and a   time-out was affected.  A solution of 0.5% Marcaine with epinephrine was   infiltrated in the infraumbilical location.  The umbilicus was elevated.  An    incision was made.  The fascia was penetrated with a Veress needle.  Saline   drop test was permissive to proceed with step pneumo insufflation.  Once fully   pneumo insufflated, an 11 mm trocar was placed. This allowed survey of the   abdomen.  The liver was obviously congested.  The gallbladder wall was   somewhat thickened.  The gallbladder was intrahepatic.  There was some scar   formation of the left diaphragm, but no other obvious intra-abdominal   pathology.  An 11 mm trocar was placed in the epigastrium, two 5 mm trocars   were placed in right upper quadrant.  The gallbladder was grasped and   elevated.  The hepatoduodenal ligament was defined.  The triangle of Calot was   carefully dissected.  There was a lymph node associated with triangle, which   was included in the specimen.  The patient had the cystic duct   circumferentially dissected, doubly clipped and divided.  Cystic artery was   identified, doubly clipped and divided.  The gallbladder was taken out in   anterograde fashion using countertraction electrocautery and ultimately   delivered via an Endosac.  The patient had bleeding from the gallbladder   fossa, which was somewhat laborious to control.  This was done with   electrocautery and ultimately 2 doses of Gonzalo powder.  Ultimately clot was   observed and the bleeding appeared to stop.  A Bard channel drain was placed   in the subhepatic space and brought out through the right upper quadrant   trocar site sewn in place using Vicryl suture and connected to bulb suction   and this remained dried intraoperatively.  The patient had right upper   quadrant irrigated, otherwise decompressed.  The trocars were removed.  The   fascia was closed using 0 Vicryl suture, the skin with running 4-0 Monocryl.    The remaining wounds were sealed with Dermabond.  The patient had an estimated   blood loss around 200 mL. Sponge, instrument and needle counts were reported   as correct.  There was no specific  intraoperative complication, but patient   did have higher than average bleeding.  Source of jaundice was not identified.        ______________________________  MD ADRIANA HOOVER/MAAME    DD:  10/28/2021 13:10  DT:  10/28/2021 14:23    Job#:  [id number]    CC:Flavio Early M.D.(User)

## 2021-10-28 NOTE — ANESTHESIA PREPROCEDURE EVALUATION
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, uncontrolled (HCC)   (positive) Uncontrolled type 1 diabetes mellitus (HCC)       Physical Exam    Airway   Mallampati: II  TM distance: >3 FB  Neck ROM: full       Cardiovascular - normal exam  Rhythm: regular  Rate: normal  (-) murmur     Dental - normal exam  (+) upper dentures           Pulmonary - normal exam  Breath sounds clear to auscultation     Abdominal    Neurological - normal exam                 Anesthesia Plan    ASA 3   ASA physical status 3 criteria: diabetes - poorly controlled and CAD/stents (> 3 months)    Plan - general       Airway plan will be ETT          Induction: intravenous    Postoperative Plan: Postoperative administration of opioids is intended.    Pertinent diagnostic labs and testing reviewed    Informed Consent:    Anesthetic plan and risks discussed with patient.    Use of blood products discussed with: patient whom consented to blood products.

## 2021-10-28 NOTE — ANESTHESIA PROCEDURE NOTES
Airway    Date/Time: 10/28/2021 11:18 AM  Performed by: Flavio Early M.D.  Authorized by: Flavio Early M.D.     Location:  OR  Urgency:  Elective  Difficult Airway: No    Indications for Airway Management:  Anesthesia      Spontaneous Ventilation: absent    Sedation Level:  Deep  Preoxygenated: Yes    Patient Position:  Sniffing  Mask Difficulty Assessment:  1 - vent by mask  Final Airway Type:  Endotracheal airway  Final Endotracheal Airway:  ETT  Cuffed: Yes    Technique Used for Successful ETT Placement:  Direct laryngoscopy  Devices/Methods Used in Placement:  Intubating stylet    Insertion Site:  Oral  Blade Type:  Howard  Laryngoscope Blade/Videolaryngoscope Blade Size:  2  ETT Size (mm):  7.0  Measured from:  Teeth  ETT to Teeth (cm):  21  Placement Verified by: auscultation and capnometry    Cormack-Lehane Classification:  Grade I - full view of glottis  Number of Attempts at Approach:  1

## 2021-10-28 NOTE — PROGRESS NOTES
[facility] Medicine Daily Progress Note    Date of Service  10/28/2021    Chief Complaint  Anu Manuel is a 66 y.o. female admitted 10/24/2021 with cholecystitis.    Hospital Course  Admitted with cholecystitis, suspected choledocholithiasis. Gastroenterology and Surgery were both consulted on the case. Patient was started on empiric coverage with IV Zosyn. Patient underwent ERCP with biliary sphincterotomy and we are stent placement on 10/26/2021, there were no biliary stones noted.  Patient with history of hepatitis, with unclear etiology. Previous work-up for autoimmune hepatitis was negative. She had a liver biopsy done which did show features of large bile duct obstruction. 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    Interval Problem Update  Cholecystitis - plan for cholecystectomy today  Hepatitis - LFTs remain elevated but stable  HTN - sbp 100-130  Diabetes - -190  RHEA - crea went up to 1.2  Anemia - hgb 7.7    I have personally seen and examined the patient at bedside. I discussed the plan of care with patient, bedside RN, charge RN and .    Consultants/Specialty  general surgery and GI    Code Status  DNAR/DNI    Disposition  Patient is not medically cleared.   Anticipate discharge to to home with close outpatient follow-up.  I have placed the appropriate orders for post-discharge needs.    Review of Systems  Review of Systems   Constitutional: Positive for malaise/fatigue. Negative for chills, diaphoresis and fever.   HENT: Negative for congestion, hearing loss and sore throat.    Eyes: Negative for blurred vision.   Respiratory: Negative for cough, shortness of breath and wheezing.    Cardiovascular: Negative for chest pain, palpitations and leg swelling.   Gastrointestinal: Positive for abdominal pain. Negative for diarrhea, heartburn, nausea and vomiting.   Genitourinary:  Negative for dysuria, flank pain and hematuria.   Musculoskeletal: Negative for back pain, joint pain, myalgias and neck pain.   Skin: Negative for rash.   Neurological: Positive for weakness. Negative for dizziness, sensory change, speech change, focal weakness and headaches.   Psychiatric/Behavioral: The patient is nervous/anxious.         Physical Exam  Temp:  [36.7 °C (98 °F)-37.3 °C (99.1 °F)] 37.1 °C (98.7 °F)  Pulse:  [58-84] 67  Resp:  [18] 18  BP: (100-132)/(50-68) 132/65  SpO2:  [92 %-96 %] 92 %    Physical Exam  Vitals and nursing note reviewed.   HENT:      Head: Normocephalic and atraumatic.      Nose: No congestion.      Mouth/Throat:      Mouth: Mucous membranes are moist.   Eyes:      General: Scleral icterus present.      Extraocular Movements: Extraocular movements intact.      Conjunctiva/sclera: Conjunctivae normal.   Cardiovascular:      Rate and Rhythm: Normal rate and regular rhythm.   Pulmonary:      Effort: Pulmonary effort is normal.      Breath sounds: Normal breath sounds.   Abdominal:      General: There is no distension.      Tenderness: There is abdominal tenderness (RUQ). There is no guarding or rebound.   Musculoskeletal:      Cervical back: No tenderness.      Right lower leg: Edema present.      Left lower leg: Edema present.   Skin:     Coloration: Skin is jaundiced.   Neurological:      General: No focal deficit present.      Mental Status: She is alert and oriented to person, place, and time.      Cranial Nerves: No cranial nerve deficit.   Psychiatric:         Mood and Affect: Mood is anxious.         Fluids    Intake/Output Summary (Last 24 hours) at 10/28/2021 0921  Last data filed at 10/28/2021 0725  Gross per 24 hour   Intake 1073.33 ml   Output 0 ml   Net 1073.33 ml       Laboratory  Recent Labs     10/26/21  0308 10/27/21  0400 10/28/21  0346   WBC 10.0 8.6 8.6   RBC 2.38* 2.44* 2.35*   HEMOGLOBIN 8.0* 8.1* 7.7*   HEMATOCRIT 24.5* 26.4* 25.0*   .9* 108.2* 106.4*    MCH 33.6* 33.2* 32.8   MCHC 32.7* 30.7* 30.8*   RDW 62.4* 63.6* 63.7*   PLATELETCT 324 339 328   MPV 11.5 12.0 11.8     Recent Labs     10/26/21  0308 10/27/21  0400 10/28/21  0346   SODIUM 135 128* 131*   POTASSIUM 3.6 4.6 4.2   CHLORIDE 102 94* 98   CO2 22 22 21   GLUCOSE 41* 464* 176*   BUN 12 17 21   CREATININE 0.94 1.12 1.21   CALCIUM 8.4* 8.2* 8.1*     Recent Labs     10/26/21  0308   INR 1.11               Imaging  [id number] STENT - TUBE CHANGE   Final Result      Digitized intraoperative radiograph is submitted for review.  This examination is not for diagnostic purpose but for guidance during a surgical procedure.      US-RUQ   Final Result         1.  Gallbladder sludge and cholelithiasis with gallbladder wall thickening and positive sonographic Cifuentes's sign, findings sonographically compatible with cholecystitis.   2.  Hepatomegaly and echogenic liver, compatible with fatty change versus fibrosis.           Assessment/Plan  * Cholecystitis- (present on admission)  Assessment & Plan  IV Zosyn  ERCP with sphincterotomy and biliary stent placement 10/26/2021  Pain control  Plan for Cholecystectomy today    Hepatitis- (present on admission)  Assessment & Plan  Unclear etiology  Ursodiol  Gastroenterology following    Chronic pain- (present on admission)  Assessment & Plan  Pain control    Hyponatremia- (present on admission)  Assessment & Plan  Follow cmp    Anemia- (present on admission)  Assessment & Plan  Follow cbc    CAD S/P percutaneous coronary angioplasty- (present on admission)  Assessment & Plan  Metoprolol  Hold ASA and Zetia      Anxiety- (present on admission)  Assessment & Plan  Zoloft, Xanax    Hypertension- (present on admission)  Assessment & Plan  Amlodipine  Decrease Metoprolol to 100 mg    RHEA (acute kidney injury) (HCC)- (present on admission)  Assessment & Plan  IVF hydration  Follow bmp    Uncontrolled type 1 diabetes mellitus (HCC)- (present on admission)  Assessment &  Plan  Hold Semglee  Adjusted SSI       VTE prophylaxis: SCDs/TEDs    I have performed a physical exam and reviewed and updated ROS and Plan today (10/28/2021). In review of yesterday's note (10/27/2021), there are no changes except as documented above.

## 2021-10-28 NOTE — ANESTHESIA POSTPROCEDURE EVALUATION
Patient: Anu Manuel    Procedure Summary     Date: 10/28/21 Room / Location: [place] [id number] / SURGERY [facility]    Anesthesia Start: 1110 Anesthesia Stop: 1256    Procedure: CHOLECYSTECTOMY, LAPAROSCOPIC (N/A Abdomen) Diagnosis: (chronic cholecystitis with cholelithiasis)    Surgeons: Tello Trammell M.D. Responsible Provider: Flavio Early M.D.    Anesthesia Type: general ASA Status: 3          Final Anesthesia Type: general  Last vitals  BP   Blood Pressure: 129/68    Temp   37 °C (98.6 °F)    Pulse   60   Resp   14    SpO2   94 %      Anesthesia Post Evaluation    Patient location during evaluation: PACU  Patient participation: complete - patient participated  Level of consciousness: lethargic  Pain score: 6    Airway patency: patent  Anesthetic complications: no  Cardiovascular status: hemodynamically stable  Respiratory status: acceptable and face mask  Hydration status: euvolemic    PONV: none          No complications documented.     Nurse Pain Score: 5 (NPRS)

## 2021-10-28 NOTE — OR SURGEON
Immediate Post OP Note    PreOp Diagnosis:cc +      PostOp Diagnosis: same      Procedure(s):  CHOLECYSTECTOMY, LAPAROSCOPIC - Wound Class: Contaminated    Surgeon(s):  Tello Trammell M.D.    Anesthesiologist/Type of Anesthesia:  Anesthesiologist: Flavio Early M.D./General    Surgical Staff:  Circulator: JAGJIT Pizarro Circulator: Lisy Brar R.N.  Relief Scrub: Gabby Marrero    Specimens removed if any:  ID Type Source Tests Collected by Time Destination   A : gallbladder Tissue Gallbladder PATHOLOGY SPECIMEN Tello Trammell M.D. 10/28/2021 12:10 PM        Estimated Blood Loss: 200    Findings: congested liver , slow clot formation     Complications: 0 higher than average bleeding   ALBINO drain placed .        10/28/2021 12:58 PM Tello Trammell M.D.

## 2021-10-28 NOTE — PROGRESS NOTES
Surgical Progress Note    Author: Tello Trammell M.D. Date & Time created: 10/28/2021   10:03 AM     Interval Events:  Labs reviewed  , bili still rising , more anemic  Will get COD   ERCP normal , gallbladder filled , no cystic or biliary duct occlusion , stented  Yet bili went up   Proceed  cholecystectomy to hopefully alleviate pain though acute peter ruled out   GI note reviewed and helpful   Still draped in mystery   Review of Systems   Gastrointestinal: Positive for abdominal pain. Negative for nausea and vomiting.   Skin: Positive for itching.   All other systems reviewed and are negative.    Hemodynamics:  Temp (24hrs), Av.1 °C (98.7 °F), Min:36.7 °C (98 °F), Max:37.3 °C (99.1 °F)  Temperature: 37.1 °C (98.7 °F)  Pulse  Av.5  Min: 54  Max: 84   Blood Pressure: 132/65     Respiratory:    Respiration: 18, Pulse Oximetry: 92 %        RUL Breath Sounds: Diminished, RML Breath Sounds: Diminished, RLL Breath Sounds: Diminished, ARLEN Breath Sounds: Diminished, LLL Breath Sounds: Diminished  Neuro:  GCS       Fluids:    Intake/Output Summary (Last 24 hours) at 10/26/2021 1057  Last data filed at 10/26/2021 1050  Gross per 24 hour   Intake 590 ml   Output --   Net 590 ml        Current Diet Order   Procedures   • Diet NPO Restrict to: Sips with Medications     Physical Exam  Vitals and nursing note reviewed. Exam conducted with a chaperone present.   Constitutional:       Appearance: She is not ill-appearing.   HENT:      Head: Normocephalic.   Eyes:      General: Scleral icterus present.      Pupils: Pupils are equal, round, and reactive to light.   Cardiovascular:      Rate and Rhythm: Normal rate and regular rhythm.      Pulses: Normal pulses.   Pulmonary:      Effort: Pulmonary effort is normal.      Breath sounds: Normal breath sounds.   Abdominal:      General: Bowel sounds are normal.      Palpations: Abdomen is soft.      Tenderness: There is abdominal tenderness.   Musculoskeletal:         General:  Normal range of motion.   Skin:     General: Skin is warm.      Capillary Refill: Capillary refill takes less than 2 seconds.   Neurological:      General: No focal deficit present.      Mental Status: She is alert.   Psychiatric:         Mood and Affect: Mood normal.       Labs:  Recent Results (from the past 24 hour(s))   POCT glucose device results    Collection Time: 10/27/21 11:35 AM   Result Value Ref Range    Glucose - Accu-Ck 315 (H) 65 - 99 mg/dL   POCT glucose device results    Collection Time: 10/27/21  5:03 PM   Result Value Ref Range    Glucose - Accu-Ck 172 (H) 65 - 99 mg/dL   POCT glucose device results    Collection Time: 10/27/21 11:20 PM   Result Value Ref Range    Glucose - Accu-Ck 194 (H) 65 - 99 mg/dL   CBC WITH DIFFERENTIAL    Collection Time: 10/28/21  3:46 AM   Result Value Ref Range    WBC 8.6 4.8 - 10.8 K/uL    RBC 2.35 (L) 4.20 - 5.40 M/uL    Hemoglobin 7.7 (L) 12.0 - 16.0 g/dL    Hematocrit 25.0 (L) 37.0 - 47.0 %    .4 (H) 81.4 - 97.8 fL    MCH 32.8 27.0 - 33.0 pg    MCHC 30.8 (L) 33.6 - 35.0 g/dL    RDW 63.7 (H) 35.9 - 50.0 fL    Platelet Count 328 164 - 446 K/uL    MPV 11.8 9.0 - 12.9 fL    Neutrophils-Polys 71.10 44.00 - 72.00 %    Lymphocytes 11.60 (L) 22.00 - 41.00 %    Monocytes 10.30 0.00 - 13.40 %    Eosinophils 5.60 0.00 - 6.90 %    Basophils 0.70 0.00 - 1.80 %    Immature Granulocytes 0.70 0.00 - 0.90 %    Nucleated RBC 0.00 /100 WBC    Neutrophils (Absolute) 6.10 2.00 - 7.15 K/uL    Lymphs (Absolute) 0.99 (L) 1.00 - 4.80 K/uL    Monos (Absolute) 0.88 (H) 0.00 - 0.85 K/uL    Eos (Absolute) 0.48 0.00 - 0.51 K/uL    Baso (Absolute) 0.06 0.00 - 0.12 K/uL    Immature Granulocytes (abs) 0.06 0.00 - 0.11 K/uL    NRBC (Absolute) 0.00 K/uL   Comp Metabolic Panel    Collection Time: 10/28/21  3:46 AM   Result Value Ref Range    Sodium 131 (L) 135 - 145 mmol/L    Potassium 4.2 3.6 - 5.5 mmol/L    Chloride 98 96 - 112 mmol/L    Co2 21 20 - 33 mmol/L    Anion Gap 12.0 7.0 - 16.0     Glucose 176 (H) 65 - 99 mg/dL    Bun 21 8 - 22 mg/dL    Creatinine 1.21 0.50 - 1.40 mg/dL    Calcium 8.1 (L) 8.5 - 10.5 mg/dL    AST(SGOT) 136 (H) 12 - 45 U/L    ALT(SGPT) 89 (H) 2 - 50 U/L    Alkaline Phosphatase 1692 (H) 30 - 99 U/L    Total Bilirubin 9.5 (H) 0.1 - 1.5 mg/dL    Albumin 2.4 (L) 3.2 - 4.9 g/dL    Total Protein 6.1 6.0 - 8.2 g/dL    Globulin 3.7 (H) 1.9 - 3.5 g/dL    A-G Ratio 0.6 g/dL   LIPASE    Collection Time: 10/28/21  3:46 AM   Result Value Ref Range    Lipase 6 (L) 11 - 82 U/L   ESTIMATED GFR    Collection Time: 10/28/21  3:46 AM   Result Value Ref Range    GFR If  54 (A) >60 mL/min/1.73 m 2    GFR If Non African American 45 (A) >60 mL/min/1.73 m 2   POCT glucose device results    Collection Time: 10/28/21  4:30 AM   Result Value Ref Range    Glucose - Accu-Ck 177 (H) 65 - 99 mg/dL     Medical Decision Making, by Problem:  Active Hospital Problems    Diagnosis    • RHEA (acute kidney injury) (HCC) [N17.9]      Priority: High   • Hypertension [I10]      Priority: Medium   • Chronic pain [G89.29]    • Cholecystitis [K81.9]    • Hepatitis [K75.9]    • Hyponatremia [E87.1]    • Anemia [D64.9]    • CAD S/P percutaneous coronary angioplasty [I25.10, Z98.61]    • Anxiety [F41.9]    • Uncontrolled type 1 diabetes mellitus (HCC) [E10.65]      ICD-10 transition       Plan:     surgical decision   Risks explained and accepted   30 minutes     Quality Measures:  Quality-Core Measures   Jacob catheter::  No Jacob  DVT prophylaxis pharmacological::  Contraindicated - High bleeding risk  Antibiotics:  Treating active infection/contamination beyond 24 hours perioperative coverage      Discussed patient condition with Patient

## 2021-10-28 NOTE — ANESTHESIA TIME REPORT
Anesthesia Start and Stop Event Times     Date Time Event    10/28/2021 1109 Ready for Procedure     1110 Anesthesia Start     1256 Anesthesia Stop        Responsible Staff  10/28/21    Name Role Begin End    August W SHARON Early. Anesth 1110 1256        Preop Diagnosis (Free Text):  Pre-op Diagnosis     cholecystitis        Preop Diagnosis (Codes):    Premium Reason  Non-Premium    Comments:

## 2021-10-29 PROBLEM — R60.0 BILATERAL LOWER EXTREMITY EDEMA: Status: ACTIVE | Noted: 2021-10-29

## 2021-10-29 LAB
ALBUMIN SERPL BCP-MCNC: 2.3 G/DL (ref 3.2–4.9)
ALBUMIN/GLOB SERPL: 0.7 G/DL
ALP SERPL-CCNC: 1354 U/L (ref 30–99)
ALT SERPL-CCNC: 103 U/L (ref 2–50)
ANION GAP SERPL CALC-SCNC: 9 MMOL/L (ref 7–16)
AST SERPL-CCNC: 173 U/L (ref 12–45)
BASOPHILS # BLD AUTO: 0.4 % (ref 0–1.8)
BASOPHILS # BLD AUTO: 0.5 % (ref 0–1.8)
BASOPHILS # BLD: 0.05 K/UL (ref 0–0.12)
BASOPHILS # BLD: 0.05 K/UL (ref 0–0.12)
BILIRUB SERPL-MCNC: 7.9 MG/DL (ref 0.1–1.5)
BUN SERPL-MCNC: 23 MG/DL (ref 8–22)
CALCIUM SERPL-MCNC: 8.1 MG/DL (ref 8.5–10.5)
CHLORIDE SERPL-SCNC: 99 MMOL/L (ref 96–112)
CO2 SERPL-SCNC: 22 MMOL/L (ref 20–33)
CREAT SERPL-MCNC: 1.3 MG/DL (ref 0.5–1.4)
EOSINOPHIL # BLD AUTO: 0.02 K/UL (ref 0–0.51)
EOSINOPHIL # BLD AUTO: 0.15 K/UL (ref 0–0.51)
EOSINOPHIL NFR BLD: 0.2 % (ref 0–6.9)
EOSINOPHIL NFR BLD: 1.3 % (ref 0–6.9)
ERYTHROCYTE [DISTWIDTH] IN BLOOD BY AUTOMATED COUNT: 66.6 FL (ref 35.9–50)
ERYTHROCYTE [DISTWIDTH] IN BLOOD BY AUTOMATED COUNT: 68 FL (ref 35.9–50)
GLOBULIN SER CALC-MCNC: 3.1 G/DL (ref 1.9–3.5)
GLUCOSE BLD-MCNC: 200 MG/DL (ref 65–99)
GLUCOSE BLD-MCNC: 202 MG/DL (ref 65–99)
GLUCOSE BLD-MCNC: 209 MG/DL (ref 65–99)
GLUCOSE BLD-MCNC: 222 MG/DL (ref 65–99)
GLUCOSE BLD-MCNC: 251 MG/DL (ref 65–99)
GLUCOSE SERPL-MCNC: 198 MG/DL (ref 65–99)
HCT VFR BLD AUTO: 23.8 % (ref 37–47)
HCT VFR BLD AUTO: 23.9 % (ref 37–47)
HGB BLD-MCNC: 7.5 G/DL (ref 12–16)
HGB BLD-MCNC: 7.5 G/DL (ref 12–16)
HGB BLD-MCNC: 7.6 G/DL (ref 12–16)
IMM GRANULOCYTES # BLD AUTO: 0.06 K/UL (ref 0–0.11)
IMM GRANULOCYTES # BLD AUTO: 0.07 K/UL (ref 0–0.11)
IMM GRANULOCYTES NFR BLD AUTO: 0.6 % (ref 0–0.9)
IMM GRANULOCYTES NFR BLD AUTO: 0.6 % (ref 0–0.9)
LYMPHOCYTES # BLD AUTO: 0.81 K/UL (ref 1–4.8)
LYMPHOCYTES # BLD AUTO: 0.92 K/UL (ref 1–4.8)
LYMPHOCYTES NFR BLD: 7.1 % (ref 22–41)
LYMPHOCYTES NFR BLD: 8.9 % (ref 22–41)
MCH RBC QN AUTO: 32.5 PG (ref 27–33)
MCH RBC QN AUTO: 33 PG (ref 27–33)
MCHC RBC AUTO-ENTMCNC: 31.5 G/DL (ref 33.6–35)
MCHC RBC AUTO-ENTMCNC: 31.8 G/DL (ref 33.6–35)
MCV RBC AUTO: 103 FL (ref 81.4–97.8)
MCV RBC AUTO: 103.9 FL (ref 81.4–97.8)
MONOCYTES # BLD AUTO: 1.24 K/UL (ref 0–0.85)
MONOCYTES # BLD AUTO: 1.42 K/UL (ref 0–0.85)
MONOCYTES NFR BLD AUTO: 12 % (ref 0–13.4)
MONOCYTES NFR BLD AUTO: 12.4 % (ref 0–13.4)
MORPHOLOGY BLD-IMP: NORMAL
NEUTROPHILS # BLD AUTO: 8.05 K/UL (ref 2–7.15)
NEUTROPHILS # BLD AUTO: 8.91 K/UL (ref 2–7.15)
NEUTROPHILS NFR BLD: 77.8 % (ref 44–72)
NEUTROPHILS NFR BLD: 78.2 % (ref 44–72)
NRBC # BLD AUTO: 0 K/UL
NRBC # BLD AUTO: 0 K/UL
NRBC BLD-RTO: 0 /100 WBC
NRBC BLD-RTO: 0 /100 WBC
NT-PROBNP SERPL IA-MCNC: 2598 PG/ML (ref 0–125)
PLATELET # BLD AUTO: 282 K/UL (ref 164–446)
PLATELET # BLD AUTO: 285 K/UL (ref 164–446)
PMV BLD AUTO: 11.4 FL (ref 9–12.9)
PMV BLD AUTO: 11.6 FL (ref 9–12.9)
POTASSIUM SERPL-SCNC: 4.7 MMOL/L (ref 3.6–5.5)
PROT SERPL-MCNC: 5.4 G/DL (ref 6–8.2)
PTH-INTACT SERPL-MCNC: 22.3 PG/ML (ref 14–72)
RBC # BLD AUTO: 2.3 M/UL (ref 4.2–5.4)
RBC # BLD AUTO: 2.31 M/UL (ref 4.2–5.4)
SODIUM SERPL-SCNC: 130 MMOL/L (ref 135–145)
WBC # BLD AUTO: 10.3 K/UL (ref 4.8–10.8)
WBC # BLD AUTO: 11.4 K/UL (ref 4.8–10.8)

## 2021-10-29 PROCEDURE — 94760 N-INVAS EAR/PLS OXIMETRY 1: CPT

## 2021-10-29 PROCEDURE — 85025 COMPLETE CBC W/AUTO DIFF WBC: CPT

## 2021-10-29 PROCEDURE — A9270 NON-COVERED ITEM OR SERVICE: HCPCS | Performed by: STUDENT IN AN ORGANIZED HEALTH CARE EDUCATION/TRAINING PROGRAM

## 2021-10-29 PROCEDURE — 700111 HCHG RX REV CODE 636 W/ 250 OVERRIDE (IP): Performed by: FAMILY MEDICINE

## 2021-10-29 PROCEDURE — 85018 HEMOGLOBIN: CPT

## 2021-10-29 PROCEDURE — 83970 ASSAY OF PARATHORMONE: CPT

## 2021-10-29 PROCEDURE — 700111 HCHG RX REV CODE 636 W/ 250 OVERRIDE (IP): Performed by: STUDENT IN AN ORGANIZED HEALTH CARE EDUCATION/TRAINING PROGRAM

## 2021-10-29 PROCEDURE — 99233 SBSQ HOSP IP/OBS HIGH 50: CPT | Performed by: FAMILY MEDICINE

## 2021-10-29 PROCEDURE — 82962 GLUCOSE BLOOD TEST: CPT | Mod: 91

## 2021-10-29 PROCEDURE — 700105 HCHG RX REV CODE 258: Performed by: FAMILY MEDICINE

## 2021-10-29 PROCEDURE — 99024 POSTOP FOLLOW-UP VISIT: CPT | Performed by: SURGERY

## 2021-10-29 PROCEDURE — 700102 HCHG RX REV CODE 250 W/ 637 OVERRIDE(OP): Performed by: STUDENT IN AN ORGANIZED HEALTH CARE EDUCATION/TRAINING PROGRAM

## 2021-10-29 PROCEDURE — A9270 NON-COVERED ITEM OR SERVICE: HCPCS | Performed by: FAMILY MEDICINE

## 2021-10-29 PROCEDURE — 36415 COLL VENOUS BLD VENIPUNCTURE: CPT

## 2021-10-29 PROCEDURE — 770006 HCHG ROOM/CARE - MED/SURG/GYN SEMI*

## 2021-10-29 PROCEDURE — 700102 HCHG RX REV CODE 250 W/ 637 OVERRIDE(OP): Performed by: FAMILY MEDICINE

## 2021-10-29 PROCEDURE — 83880 ASSAY OF NATRIURETIC PEPTIDE: CPT

## 2021-10-29 PROCEDURE — 80053 COMPREHEN METABOLIC PANEL: CPT

## 2021-10-29 RX ORDER — OXYCODONE HYDROCHLORIDE 10 MG/1
10 TABLET ORAL
Status: DISCONTINUED | OUTPATIENT
Start: 2021-10-29 | End: 2021-11-02

## 2021-10-29 RX ORDER — HYDROMORPHONE HYDROCHLORIDE 1 MG/ML
1 INJECTION, SOLUTION INTRAMUSCULAR; INTRAVENOUS; SUBCUTANEOUS
Status: DISCONTINUED | OUTPATIENT
Start: 2021-10-29 | End: 2021-11-02

## 2021-10-29 RX ADMIN — INSULIN LISPRO 4 UNITS: 100 INJECTION, SOLUTION INTRAVENOUS; SUBCUTANEOUS at 23:09

## 2021-10-29 RX ADMIN — INSULIN LISPRO 4 UNITS: 100 INJECTION, SOLUTION INTRAVENOUS; SUBCUTANEOUS at 12:10

## 2021-10-29 RX ADMIN — INSULIN LISPRO 2 UNITS: 100 INJECTION, SOLUTION INTRAVENOUS; SUBCUTANEOUS at 05:55

## 2021-10-29 RX ADMIN — OXYCODONE HYDROCHLORIDE 10 MG: 10 TABLET ORAL at 20:36

## 2021-10-29 RX ADMIN — AMLODIPINE BESYLATE 5 MG: 10 TABLET ORAL at 17:20

## 2021-10-29 RX ADMIN — HYDROMORPHONE HYDROCHLORIDE 1 MG: 1 INJECTION, SOLUTION INTRAMUSCULAR; INTRAVENOUS; SUBCUTANEOUS at 12:44

## 2021-10-29 RX ADMIN — HYDROMORPHONE HYDROCHLORIDE 1 MG: 1 INJECTION, SOLUTION INTRAMUSCULAR; INTRAVENOUS; SUBCUTANEOUS at 15:46

## 2021-10-29 RX ADMIN — HYDROMORPHONE HYDROCHLORIDE 0.5 MG: 1 INJECTION, SOLUTION INTRAMUSCULAR; INTRAVENOUS; SUBCUTANEOUS at 04:00

## 2021-10-29 RX ADMIN — INSULIN LISPRO 4 UNITS: 100 INJECTION, SOLUTION INTRAVENOUS; SUBCUTANEOUS at 17:24

## 2021-10-29 RX ADMIN — PIPERACILLIN SODIUM AND TAZOBACTAM SODIUM 3.38 G: 3; .375 INJECTION, POWDER, LYOPHILIZED, FOR SOLUTION INTRAVENOUS at 04:29

## 2021-10-29 RX ADMIN — HYDROMORPHONE HYDROCHLORIDE 1 MG: 1 INJECTION, SOLUTION INTRAMUSCULAR; INTRAVENOUS; SUBCUTANEOUS at 19:40

## 2021-10-29 RX ADMIN — OXYCODONE HYDROCHLORIDE 10 MG: 10 TABLET ORAL at 05:52

## 2021-10-29 RX ADMIN — OMEPRAZOLE 20 MG: 20 CAPSULE, DELAYED RELEASE ORAL at 04:29

## 2021-10-29 RX ADMIN — OXYCODONE HYDROCHLORIDE 10 MG: 10 TABLET ORAL at 00:03

## 2021-10-29 RX ADMIN — HYDROMORPHONE HYDROCHLORIDE 1 MG: 1 INJECTION, SOLUTION INTRAMUSCULAR; INTRAVENOUS; SUBCUTANEOUS at 09:04

## 2021-10-29 RX ADMIN — URSODIOL 300 MG: 300 CAPSULE ORAL at 04:32

## 2021-10-29 RX ADMIN — PIPERACILLIN SODIUM AND TAZOBACTAM SODIUM 3.38 G: 3; .375 INJECTION, POWDER, LYOPHILIZED, FOR SOLUTION INTRAVENOUS at 20:35

## 2021-10-29 RX ADMIN — PIPERACILLIN SODIUM AND TAZOBACTAM SODIUM 3.38 G: 3; .375 INJECTION, POWDER, LYOPHILIZED, FOR SOLUTION INTRAVENOUS at 12:54

## 2021-10-29 RX ADMIN — SERTRALINE HYDROCHLORIDE 100 MG: 100 TABLET, FILM COATED ORAL at 17:20

## 2021-10-29 RX ADMIN — LEVOTHYROXINE SODIUM 225 MCG: 0.12 TABLET ORAL at 04:29

## 2021-10-29 RX ADMIN — SODIUM CHLORIDE: 9 INJECTION, SOLUTION INTRAVENOUS at 17:29

## 2021-10-29 RX ADMIN — DOCUSATE SODIUM 50 MG AND SENNOSIDES 8.6 MG 2 TABLET: 8.6; 5 TABLET, FILM COATED ORAL at 04:29

## 2021-10-29 RX ADMIN — DOCUSATE SODIUM 50 MG AND SENNOSIDES 8.6 MG 2 TABLET: 8.6; 5 TABLET, FILM COATED ORAL at 17:20

## 2021-10-29 RX ADMIN — OXYCODONE HYDROCHLORIDE 10 MG: 10 TABLET ORAL at 17:27

## 2021-10-29 RX ADMIN — ACETAMINOPHEN 650 MG: 325 TABLET ORAL at 00:03

## 2021-10-29 RX ADMIN — METOPROLOL SUCCINATE 100 MG: 100 TABLET, EXTENDED RELEASE ORAL at 05:52

## 2021-10-29 RX ADMIN — HYDROMORPHONE HYDROCHLORIDE 1 MG: 1 INJECTION, SOLUTION INTRAMUSCULAR; INTRAVENOUS; SUBCUTANEOUS at 23:05

## 2021-10-29 ASSESSMENT — ENCOUNTER SYMPTOMS
FEVER: 0
CHILLS: 0
BACK PAIN: 0
FOCAL WEAKNESS: 0
DIARRHEA: 0
PALPITATIONS: 0
SPEECH CHANGE: 0
HEADACHES: 0
MYALGIAS: 0
NAUSEA: 0
VOMITING: 0
BLURRED VISION: 0
DIAPHORESIS: 0
WEAKNESS: 1
SENSORY CHANGE: 0
ABDOMINAL PAIN: 1
DIZZINESS: 0
FLANK PAIN: 0
COUGH: 0
NECK PAIN: 0
SHORTNESS OF BREATH: 0
SORE THROAT: 0
NERVOUS/ANXIOUS: 1
WHEEZING: 0
HEARTBURN: 0

## 2021-10-29 ASSESSMENT — PAIN DESCRIPTION - PAIN TYPE
TYPE: ACUTE PAIN;SURGICAL PAIN
TYPE: ACUTE PAIN;SURGICAL PAIN
TYPE: ACUTE PAIN
TYPE: ACUTE PAIN

## 2021-10-29 ASSESSMENT — FIBROSIS 4 INDEX: FIB4 SCORE: 3.87

## 2021-10-29 NOTE — DISCHARGE PLANNING
.Care Transition Team Discharge Planning    Anticipates discharge disposition:  • Home    Action:  Pt was discussed in rounds today by IDT. Pt is S/P Lap Cholecystectomy on 10/28/21.   Pt received 1 Unit PRBC and Pt is on IV Zosyn with end date  11/1/21 per MAR.    Barriers to Discharge:  • Pending medical clearance    Plan:  • CM to continue to assist Pt with discharge as needed

## 2021-10-29 NOTE — PROGRESS NOTES
[facility] Medicine Daily Progress Note    Date of Service  10/29/2021    Chief Complaint  Anu Manuel is a 66 y.o. female admitted 10/24/2021 with cholecystitis.    Hospital Course  Admitted with cholecystitis, suspected choledocholithiasis. Gastroenterology and Surgery were both consulted on the case. Patient was started on empiric coverage with IV Zosyn. Patient underwent ERCP with biliary sphincterotomy and we are stent placement on 10/26/2021, there were no biliary stones noted.  Patient with history of hepatitis, with unclear etiology. Previous work-up for autoimmune hepatitis was negative. She had a liver biopsy done which did show features of large bile duct obstruction. 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    Interval Problem Update  Cholecystitis -underwent cholecystectomy yesterday, pain not controlled  Hepatitis - LFTs remain elevated but stable  HTN - sbp 100-130  Diabetes - -300  RHEA - crea went up to 1.3  Anemia - hgb 7.5 after transfusion of 1 unit PRBC    I have personally seen and examined the patient at bedside. I discussed the plan of care with patient, bedside RN, charge RN and .    Consultants/Specialty  general surgery and GI    Code Status  DNAR/DNI    Disposition  Patient is not medically cleared.   Anticipate discharge to to home with close outpatient follow-up.  I have placed the appropriate orders for post-discharge needs.    Review of Systems  Review of Systems   Constitutional: Positive for malaise/fatigue. Negative for chills, diaphoresis and fever.   HENT: Negative for congestion, hearing loss and sore throat.    Eyes: Negative for blurred vision.   Respiratory: Negative for cough, shortness of breath and wheezing.    Cardiovascular: Negative for chest pain, palpitations and leg swelling.   Gastrointestinal: Positive for abdominal pain. Negative for  diarrhea, heartburn, nausea and vomiting.   Genitourinary: Negative for dysuria, flank pain and hematuria.   Musculoskeletal: Negative for back pain, joint pain, myalgias and neck pain.   Skin: Negative for rash.   Neurological: Positive for weakness. Negative for dizziness, sensory change, speech change, focal weakness and headaches.   Psychiatric/Behavioral: The patient is nervous/anxious.         Physical Exam  Temp:  [36 °C (96.8 °F)-36.6 °C (97.8 °F)] 36.3 °C (97.4 °F)  Pulse:  [55-69] 69  Resp:  [12-18] 18  BP: (105-137)/(54-74) 126/59  SpO2:  [92 %-100 %] 94 %    Physical Exam  Vitals and nursing note reviewed.   HENT:      Head: Normocephalic and atraumatic.      Nose: No congestion.      Mouth/Throat:      Mouth: Mucous membranes are moist.   Eyes:      General: Scleral icterus present.      Extraocular Movements: Extraocular movements intact.      Conjunctiva/sclera: Conjunctivae normal.   Cardiovascular:      Rate and Rhythm: Normal rate and regular rhythm.   Pulmonary:      Effort: Pulmonary effort is normal.      Breath sounds: Normal breath sounds.   Abdominal:      General: There is no distension.      Tenderness: There is abdominal tenderness (RUQ). There is no guarding or rebound.      Comments: ALBINO drain at RUQ   Musculoskeletal:      Cervical back: No tenderness.      Right lower leg: Edema present.      Left lower leg: Edema present.   Skin:     Coloration: Skin is jaundiced.   Neurological:      General: No focal deficit present.      Mental Status: She is alert and oriented to person, place, and time.      Cranial Nerves: No cranial nerve deficit.   Psychiatric:         Mood and Affect: Mood is anxious.         Fluids    Intake/Output Summary (Last 24 hours) at 10/29/2021 1138  Last data filed at 10/29/2021 1100  Gross per 24 hour   Intake 2582.92 ml   Output 1125 ml   Net 1457.92 ml       Laboratory  Recent Labs     10/28/21  0346 10/28/21  1529 10/29/21  0346   WBC 8.6 7.0 10.3   RBC 2.35*  1.89* 2.31*   HEMOGLOBIN 7.7* 6.2* 7.5*   HEMATOCRIT 25.0* 20.2* 23.8*   .4* 106.9* 103.0*   MCH 32.8 32.8 32.5   MCHC 30.8* 30.7* 31.5*   RDW 63.7* 62.7* 66.6*   PLATELETCT 328 292 285   MPV 11.8 11.9 11.6     Recent Labs     10/27/21  0400 10/28/21  0346 10/29/21  0346   SODIUM 128* 131* 130*   POTASSIUM 4.6 4.2 4.7   CHLORIDE 94* 98 99   CO2 22 21 22   GLUCOSE 464* 176* 198*   BUN 17 21 23*   CREATININE 1.12 1.21 1.30   CALCIUM 8.2* 8.1* 8.1*                   Imaging  [id number] STENT - TUBE CHANGE   Final Result      Digitized intraoperative radiograph is submitted for review.  This examination is not for diagnostic purpose but for guidance during a surgical procedure.      US-RUQ   Final Result         1.  Gallbladder sludge and cholelithiasis with gallbladder wall thickening and positive sonographic Cifuentes's sign, findings sonographically compatible with cholecystitis.   2.  Hepatomegaly and echogenic liver, compatible with fatty change versus fibrosis.      EC-ECHOCARDIOGRAM COMPLETE W/O CONT    (Results Pending)        Assessment/Plan  * Cholecystitis- (present on admission)  Assessment & Plan  IV Zosyn  ERCP with sphincterotomy and biliary stent placement 10/26/2021  Adjust pain control  Laparoscopic cholecystectomy 10/28/2021  Encourage IS    Hepatitis- (present on admission)  Assessment & Plan  Unclear etiology  Ursodiol  Follow up with Gastroenterology     Bilateral lower extremity edema  Assessment & Plan  Check Echocardiogram    Chronic pain- (present on admission)  Assessment & Plan  Pain control    Hyponatremia- (present on admission)  Assessment & Plan  Follow cmp    Anemia- (present on admission)  Assessment & Plan  Transfused PRBC 1 u  Repeat hgb today    CAD S/P percutaneous coronary angioplasty- (present on admission)  Assessment & Plan  Metoprolol  Hold ASA and Zetia      Anxiety- (present on admission)  Assessment & Plan  Zoloft, Xanax    Hypertension- (present on  admission)  Assessment & Plan  Amlodipine  Decreased Metoprolol to 100 mg    RHEA (acute kidney injury) (HCC)- (present on admission)  Assessment & Plan  IVF hydration  Follow bmp, check PTH    Uncontrolled type 1 diabetes mellitus (HCC)- (present on admission)  Assessment & Plan  Hold Semglee  SSI       VTE prophylaxis: SCDs/TEDs    I have performed a physical exam and reviewed and updated ROS and Plan today (10/29/2021). In review of yesterday's note (10/28/2021), there are no changes except as documented above.

## 2021-10-29 NOTE — CARE PLAN
The patient is Stable - Low risk of patient condition declining or worsening    Progress made toward(s) clinical / shift goals:      Problem: Knowledge Deficit - Standard  Goal: Patient and family/care givers will demonstrate understanding of plan of care, disease process/condition, diagnostic tests and medications  Outcome: Progressing  Note: Plan of care discussed with patient. Medication education provided. All questions addressed.     Problem: Pain - Standard  Goal: Alleviation of pain or a reduction in pain to the patient’s comfort goal  Outcome: Progressing  Note: Pain level frequently assessed. PRN medications administered per MAR.

## 2021-10-29 NOTE — CARE PLAN
The patient is Watcher - Medium risk of patient condition declining or worsening    Shift Goals  Clinical Goals: pain management, Hgb increase  Patient Goals: sleep and pain control  Family Goals: No family present    Progress made toward(s) clinical / shift goals:    Problem: Knowledge Deficit - Standard  Goal: Patient and family/care givers will demonstrate understanding of plan of care, disease process/condition, diagnostic tests and medications  Outcome: Progressing     Problem: Pain - Standard  Goal: Alleviation of pain or a reduction in pain to the patient’s comfort goal  Outcome: Progressing     Problem: Fall Risk  Goal: Patient will remain free from falls  Outcome: Progressing       Patient is not progressing towards the following goals:

## 2021-10-29 NOTE — PROGRESS NOTES
Received report of patient at 0700. Patient is AOx4, PRN medications administered for complaints of pain. Assessment complete. Patient previously off unit for surgery, resting in bed at this time. 3 lap sites to abdomen with dermabond, CDI. ALBINO drain to right abdomen. ALBINO drain with 410 ml serosanguinous output since patient's arrival back to room, Dr. Trammell paged with update. Hemoglobin 6.2. Received orders for 1 unit PRBCs. Consent for blood transfusion signed by patient. Blood transfusion started. This RN present for first 15 minutes of blood transfusion. Patient tolerating well at this time with no signs of adverse reaction, VSS. Report given to night shift RNAriana.

## 2021-10-29 NOTE — PROGRESS NOTES
POD1 lap peter, unclear liver pathology, s/p 1 U PRBCs  Vitals WNL  WBC 10, H/H 7/23  Tbili 8 from 10, Alk phos down  +Jaundice, Abdomen soft, dressings c/d/i  Drain SS, 775cc    Plan:  Appreciate medical care  GI for hepatitis w/u  Continue drain to bulb suction  PRN analgesia  Repeat H/H later today  Will follow with you

## 2021-10-29 NOTE — ASSESSMENT & PLAN NOTE
IV Lasix, will transition to oral lasix on discharge  Continue aldactone  -unclear how adherent patient will be to this at home  -2/2 liver disease  Fluid restriction to 1 L per day

## 2021-10-30 ENCOUNTER — APPOINTMENT (OUTPATIENT)
Dept: CARDIOLOGY | Facility: MEDICAL CENTER | Age: 66
DRG: 418 | End: 2021-10-30
Attending: FAMILY MEDICINE
Payer: MEDICARE

## 2021-10-30 LAB
ALBUMIN SERPL BCP-MCNC: 2.1 G/DL (ref 3.2–4.9)
ALBUMIN/GLOB SERPL: 0.6 G/DL
ALP SERPL-CCNC: 1604 U/L (ref 30–99)
ALT SERPL-CCNC: 97 U/L (ref 2–50)
ANION GAP SERPL CALC-SCNC: 12 MMOL/L (ref 7–16)
AST SERPL-CCNC: 144 U/L (ref 12–45)
BACTERIA BLD CULT: NORMAL
BACTERIA BLD CULT: NORMAL
BASOPHILS # BLD AUTO: 0.4 % (ref 0–1.8)
BASOPHILS # BLD: 0.05 K/UL (ref 0–0.12)
BILIRUB SERPL-MCNC: 7 MG/DL (ref 0.1–1.5)
BUN SERPL-MCNC: 20 MG/DL (ref 8–22)
CALCIUM SERPL-MCNC: 8.1 MG/DL (ref 8.5–10.5)
CHLORIDE SERPL-SCNC: 100 MMOL/L (ref 96–112)
CO2 SERPL-SCNC: 20 MMOL/L (ref 20–33)
CREAT SERPL-MCNC: 1.22 MG/DL (ref 0.5–1.4)
EOSINOPHIL # BLD AUTO: 0.28 K/UL (ref 0–0.51)
EOSINOPHIL NFR BLD: 2.3 % (ref 0–6.9)
ERYTHROCYTE [DISTWIDTH] IN BLOOD BY AUTOMATED COUNT: 68.4 FL (ref 35.9–50)
GLOBULIN SER CALC-MCNC: 3.7 G/DL (ref 1.9–3.5)
GLUCOSE BLD-MCNC: 126 MG/DL (ref 65–99)
GLUCOSE BLD-MCNC: 177 MG/DL (ref 65–99)
GLUCOSE BLD-MCNC: 180 MG/DL (ref 65–99)
GLUCOSE BLD-MCNC: 212 MG/DL (ref 65–99)
GLUCOSE SERPL-MCNC: 128 MG/DL (ref 65–99)
HCT VFR BLD AUTO: 25.1 % (ref 37–47)
HGB BLD-MCNC: 7.9 G/DL (ref 12–16)
IMM GRANULOCYTES # BLD AUTO: 0.09 K/UL (ref 0–0.11)
IMM GRANULOCYTES NFR BLD AUTO: 0.8 % (ref 0–0.9)
LV EJECT FRACT  99904: 60
LV EJECT FRACT MOD 2C 99903: 65.99
LV EJECT FRACT MOD 4C 99902: 63.78
LV EJECT FRACT MOD BP 99901: 63.63
LYMPHOCYTES # BLD AUTO: 0.92 K/UL (ref 1–4.8)
LYMPHOCYTES NFR BLD: 7.7 % (ref 22–41)
MCH RBC QN AUTO: 33.1 PG (ref 27–33)
MCHC RBC AUTO-ENTMCNC: 31.5 G/DL (ref 33.6–35)
MCV RBC AUTO: 105 FL (ref 81.4–97.8)
MONOCYTES # BLD AUTO: 1.39 K/UL (ref 0–0.85)
MONOCYTES NFR BLD AUTO: 11.6 % (ref 0–13.4)
MORPHOLOGY BLD-IMP: NORMAL
NEUTROPHILS # BLD AUTO: 9.25 K/UL (ref 2–7.15)
NEUTROPHILS NFR BLD: 77.2 % (ref 44–72)
NRBC # BLD AUTO: 0 K/UL
NRBC BLD-RTO: 0 /100 WBC
NT-PROBNP SERPL IA-MCNC: 2003 PG/ML (ref 0–125)
PLATELET # BLD AUTO: 316 K/UL (ref 164–446)
PMV BLD AUTO: 11.7 FL (ref 9–12.9)
POTASSIUM SERPL-SCNC: 4 MMOL/L (ref 3.6–5.5)
PROT SERPL-MCNC: 5.8 G/DL (ref 6–8.2)
PTH-INTACT SERPL-MCNC: 21.9 PG/ML (ref 14–72)
RBC # BLD AUTO: 2.39 M/UL (ref 4.2–5.4)
SIGNIFICANT IND 70042: NORMAL
SIGNIFICANT IND 70042: NORMAL
SITE SITE: NORMAL
SITE SITE: NORMAL
SODIUM SERPL-SCNC: 132 MMOL/L (ref 135–145)
SOURCE SOURCE: NORMAL
SOURCE SOURCE: NORMAL
WBC # BLD AUTO: 12 K/UL (ref 4.8–10.8)

## 2021-10-30 PROCEDURE — 700111 HCHG RX REV CODE 636 W/ 250 OVERRIDE (IP): Performed by: FAMILY MEDICINE

## 2021-10-30 PROCEDURE — 36415 COLL VENOUS BLD VENIPUNCTURE: CPT

## 2021-10-30 PROCEDURE — 700102 HCHG RX REV CODE 250 W/ 637 OVERRIDE(OP): Performed by: FAMILY MEDICINE

## 2021-10-30 PROCEDURE — 80053 COMPREHEN METABOLIC PANEL: CPT

## 2021-10-30 PROCEDURE — 93306 TTE W/DOPPLER COMPLETE: CPT

## 2021-10-30 PROCEDURE — 82962 GLUCOSE BLOOD TEST: CPT

## 2021-10-30 PROCEDURE — 93306 TTE W/DOPPLER COMPLETE: CPT | Mod: 26 | Performed by: INTERNAL MEDICINE

## 2021-10-30 PROCEDURE — 99024 POSTOP FOLLOW-UP VISIT: CPT | Performed by: SURGERY

## 2021-10-30 PROCEDURE — 770006 HCHG ROOM/CARE - MED/SURG/GYN SEMI*

## 2021-10-30 PROCEDURE — 700105 HCHG RX REV CODE 258: Performed by: FAMILY MEDICINE

## 2021-10-30 PROCEDURE — A9270 NON-COVERED ITEM OR SERVICE: HCPCS | Performed by: STUDENT IN AN ORGANIZED HEALTH CARE EDUCATION/TRAINING PROGRAM

## 2021-10-30 PROCEDURE — 83880 ASSAY OF NATRIURETIC PEPTIDE: CPT

## 2021-10-30 PROCEDURE — 85025 COMPLETE CBC W/AUTO DIFF WBC: CPT

## 2021-10-30 PROCEDURE — 700102 HCHG RX REV CODE 250 W/ 637 OVERRIDE(OP): Performed by: STUDENT IN AN ORGANIZED HEALTH CARE EDUCATION/TRAINING PROGRAM

## 2021-10-30 PROCEDURE — A9270 NON-COVERED ITEM OR SERVICE: HCPCS | Performed by: FAMILY MEDICINE

## 2021-10-30 PROCEDURE — 83970 ASSAY OF PARATHORMONE: CPT

## 2021-10-30 PROCEDURE — 99232 SBSQ HOSP IP/OBS MODERATE 35: CPT | Performed by: FAMILY MEDICINE

## 2021-10-30 RX ADMIN — INSULIN LISPRO 2 UNITS: 100 INJECTION, SOLUTION INTRAVENOUS; SUBCUTANEOUS at 23:16

## 2021-10-30 RX ADMIN — SERTRALINE HYDROCHLORIDE 100 MG: 100 TABLET, FILM COATED ORAL at 17:51

## 2021-10-30 RX ADMIN — AMLODIPINE BESYLATE 5 MG: 10 TABLET ORAL at 17:50

## 2021-10-30 RX ADMIN — OXYCODONE HYDROCHLORIDE 10 MG: 10 TABLET ORAL at 21:04

## 2021-10-30 RX ADMIN — INSULIN LISPRO 2 UNITS: 100 INJECTION, SOLUTION INTRAVENOUS; SUBCUTANEOUS at 17:55

## 2021-10-30 RX ADMIN — HYDROMORPHONE HYDROCHLORIDE 1 MG: 1 INJECTION, SOLUTION INTRAMUSCULAR; INTRAVENOUS; SUBCUTANEOUS at 09:50

## 2021-10-30 RX ADMIN — HYDROMORPHONE HYDROCHLORIDE 1 MG: 1 INJECTION, SOLUTION INTRAMUSCULAR; INTRAVENOUS; SUBCUTANEOUS at 14:48

## 2021-10-30 RX ADMIN — OXYCODONE HYDROCHLORIDE 10 MG: 10 TABLET ORAL at 12:00

## 2021-10-30 RX ADMIN — INSULIN LISPRO 4 UNITS: 100 INJECTION, SOLUTION INTRAVENOUS; SUBCUTANEOUS at 12:08

## 2021-10-30 RX ADMIN — PIPERACILLIN SODIUM AND TAZOBACTAM SODIUM 3.38 G: 3; .375 INJECTION, POWDER, LYOPHILIZED, FOR SOLUTION INTRAVENOUS at 12:01

## 2021-10-30 RX ADMIN — PIPERACILLIN SODIUM AND TAZOBACTAM SODIUM 3.38 G: 3; .375 INJECTION, POWDER, LYOPHILIZED, FOR SOLUTION INTRAVENOUS at 20:28

## 2021-10-30 RX ADMIN — OXYCODONE HYDROCHLORIDE 10 MG: 10 TABLET ORAL at 01:43

## 2021-10-30 RX ADMIN — LEVOTHYROXINE SODIUM 225 MCG: 0.12 TABLET ORAL at 05:09

## 2021-10-30 RX ADMIN — URSODIOL 300 MG: 300 CAPSULE ORAL at 05:11

## 2021-10-30 RX ADMIN — PIPERACILLIN SODIUM AND TAZOBACTAM SODIUM 3.38 G: 3; .375 INJECTION, POWDER, LYOPHILIZED, FOR SOLUTION INTRAVENOUS at 05:09

## 2021-10-30 RX ADMIN — HYDROMORPHONE HYDROCHLORIDE 1 MG: 1 INJECTION, SOLUTION INTRAMUSCULAR; INTRAVENOUS; SUBCUTANEOUS at 19:38

## 2021-10-30 RX ADMIN — POLYETHYLENE GLYCOL 3350 1 PACKET: 17 POWDER, FOR SOLUTION ORAL at 05:09

## 2021-10-30 RX ADMIN — HYDROMORPHONE HYDROCHLORIDE 1 MG: 1 INJECTION, SOLUTION INTRAMUSCULAR; INTRAVENOUS; SUBCUTANEOUS at 23:12

## 2021-10-30 RX ADMIN — DOCUSATE SODIUM 50 MG AND SENNOSIDES 8.6 MG 2 TABLET: 8.6; 5 TABLET, FILM COATED ORAL at 17:51

## 2021-10-30 RX ADMIN — HYDROMORPHONE HYDROCHLORIDE 1 MG: 1 INJECTION, SOLUTION INTRAMUSCULAR; INTRAVENOUS; SUBCUTANEOUS at 06:01

## 2021-10-30 RX ADMIN — OMEPRAZOLE 20 MG: 20 CAPSULE, DELAYED RELEASE ORAL at 05:08

## 2021-10-30 RX ADMIN — OXYCODONE HYDROCHLORIDE 10 MG: 10 TABLET ORAL at 05:08

## 2021-10-30 RX ADMIN — METOPROLOL SUCCINATE 100 MG: 100 TABLET, EXTENDED RELEASE ORAL at 05:09

## 2021-10-30 RX ADMIN — DOCUSATE SODIUM 50 MG AND SENNOSIDES 8.6 MG 2 TABLET: 8.6; 5 TABLET, FILM COATED ORAL at 05:08

## 2021-10-30 RX ADMIN — OXYCODONE HYDROCHLORIDE 10 MG: 10 TABLET ORAL at 17:56

## 2021-10-30 ASSESSMENT — ENCOUNTER SYMPTOMS
FEVER: 0
CHILLS: 0
WEAKNESS: 1
VOMITING: 0
COUGH: 0
SORE THROAT: 0
SENSORY CHANGE: 0
BLURRED VISION: 0
NAUSEA: 0
ABDOMINAL PAIN: 1
FLANK PAIN: 0
FOCAL WEAKNESS: 0
DIZZINESS: 0
HEARTBURN: 0
MYALGIAS: 0
DIARRHEA: 0
BACK PAIN: 0
WHEEZING: 0
SHORTNESS OF BREATH: 0
HEADACHES: 0
NECK PAIN: 0
PALPITATIONS: 0
DIAPHORESIS: 0
NERVOUS/ANXIOUS: 1
SPEECH CHANGE: 0

## 2021-10-30 ASSESSMENT — PAIN DESCRIPTION - PAIN TYPE
TYPE: ACUTE PAIN

## 2021-10-30 NOTE — PROGRESS NOTES
Pt is A&O x4, calls appropriately  Pain 10/10, medicated per MAR   - nausea  Tolerating a diabetic diet   x3 lap sites with CDI dermabond  RLQ ALBINO, high bloody sanginous output  + Voids  + flatus  Last BM 10/27  Up SBA  Bed alarm refused, pt moderate fall risk per vandana bueno  Reviewed plan of care with patient, bed in lowest position and locked, pt resting comfortably now, call light within reach, all needs met at this time. Interventions will be executed per plan of care

## 2021-10-30 NOTE — PROGRESS NOTES
Received report of patient at start of shift. Patient is AOx4, PRN medications administered for complaints of pain. Assessment complete, patient on 2L O2 via NC. ALBINO to right abdomen, output has changed from serosanguinous to bloody. Dr. Long updated, received orders for CBC lab draw at 1800. Patient resting in bed throughout shift, up to bathroom with SBA. Patient encouraged to use call light for any needs. Plan of care discussed, safety education provided.

## 2021-10-30 NOTE — PROGRESS NOTES
"Pt A&Ox4.  Complains of continued \"incision\" pain, intermittently requiring IV medications.   ALBINO output remains moderate sanguinous, see I&Os.    COVID 19 surge in effect.  "

## 2021-10-30 NOTE — CARE PLAN
The patient is Watcher - Medium risk of patient condition declining or worsening    Shift Goals  Clinical Goals: Pain management  Patient Goals: Rest    Progress made toward(s) clinical / shift goals:  Pt medicated per MAR, pain 10/10; pt resting in bed    Patient is not progressing towards the following goals:            Problem: Knowledge Deficit - Standard  Goal: Patient and family/care givers will demonstrate understanding of plan of care, disease process/condition, diagnostic tests and medications  Outcome: Progressing     Problem: Pain - Standard  Goal: Alleviation of pain or a reduction in pain to the patient’s comfort goal  Outcome: Progressing     Problem: Fall Risk  Goal: Patient will remain free from falls  Outcome: Progressing

## 2021-10-30 NOTE — PROGRESS NOTES
[facility] Medicine Daily Progress Note    Date of Service  10/30/2021    Chief Complaint  Anu Manuel is a 66 y.o. female admitted 10/24/2021 with cholecystitis.    Hospital Course  Admitted with cholecystitis, suspected choledocholithiasis. Gastroenterology and Surgery were both consulted on the case. Patient was started on empiric coverage with IV Zosyn. Patient underwent ERCP with biliary sphincterotomy and we are stent placement on 10/26/2021, there were no biliary stones noted.  Patient then underwent laparoscopic cholecystectomy on 10/28/2021.  Patient with history of hepatitis, with unclear etiology. Previous work-up for autoimmune hepatitis was negative. She had a liver biopsy done which did show features of large bile duct obstruction. 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    Interval Problem Update  Cholecystitis - pain better controlled  Hepatitis - LFTs remain elevated but stable  HTN - sbp 110-140  Diabetes - -220  RHEA - crea 1.2  Anemia - hgb 7.9    I have personally seen and examined the patient at bedside. I discussed the plan of care with patient and bedside RN.    Consultants/Specialty  general surgery and GI    Code Status  DNAR/DNI    Disposition  Patient is not medically cleared.   Anticipate discharge to to home with close outpatient follow-up.  I have placed the appropriate orders for post-discharge needs.    Review of Systems  Review of Systems   Constitutional: Positive for malaise/fatigue. Negative for chills, diaphoresis and fever.   HENT: Negative for congestion, hearing loss and sore throat.    Eyes: Negative for blurred vision.   Respiratory: Negative for cough, shortness of breath and wheezing.    Cardiovascular: Negative for chest pain, palpitations and leg swelling.   Gastrointestinal: Positive for abdominal pain. Negative for diarrhea, heartburn, nausea and  vomiting.   Genitourinary: Negative for dysuria, flank pain and hematuria.   Musculoskeletal: Negative for back pain, joint pain, myalgias and neck pain.   Skin: Negative for rash.   Neurological: Positive for weakness. Negative for dizziness, sensory change, speech change, focal weakness and headaches.   Psychiatric/Behavioral: The patient is nervous/anxious.         Physical Exam  Temp:  [36.3 °C (97.4 °F)-36.8 °C (98.2 °F)] 36.4 °C (97.6 °F)  Pulse:  [61-76] 71  Resp:  [16-18] 17  BP: (118-146)/(56-98) 118/56  SpO2:  [94 %-100 %] 94 %    Physical Exam  Vitals and nursing note reviewed.   HENT:      Head: Normocephalic and atraumatic.      Nose: No congestion.      Mouth/Throat:      Mouth: Mucous membranes are moist.   Eyes:      General: Scleral icterus present.      Extraocular Movements: Extraocular movements intact.      Conjunctiva/sclera: Conjunctivae normal.   Cardiovascular:      Rate and Rhythm: Normal rate and regular rhythm.   Pulmonary:      Effort: Pulmonary effort is normal.      Breath sounds: Normal breath sounds.   Abdominal:      General: There is no distension.      Tenderness: There is abdominal tenderness (RUQ). There is no guarding or rebound.      Comments: ALBINO drain at RUQ   Musculoskeletal:      Cervical back: No tenderness.      Right lower leg: Edema present.      Left lower leg: Edema present.   Skin:     Coloration: Skin is jaundiced.   Neurological:      General: No focal deficit present.      Mental Status: She is alert and oriented to person, place, and time.      Cranial Nerves: No cranial nerve deficit.   Psychiatric:         Mood and Affect: Mood is anxious.         Fluids    Intake/Output Summary (Last 24 hours) at 10/30/2021 0951  Last data filed at 10/30/2021 0713  Gross per 24 hour   Intake 360 ml   Output 600 ml   Net -240 ml       Laboratory  Recent Labs     10/29/21  0346 10/29/21  0346 10/29/21  1212 10/29/21  1820 10/30/21  0508   WBC 10.3  --   --  11.4* 12.0*   RBC  2.31*  --   --  2.30* 2.39*   HEMOGLOBIN 7.5*   < > 7.5* 7.6* 7.9*   HEMATOCRIT 23.8*  --   --  23.9* 25.1*   .0*  --   --  103.9* 105.0*   MCH 32.5  --   --  33.0 33.1*   MCHC 31.5*  --   --  31.8* 31.5*   RDW 66.6*  --   --  68.0* 68.4*   PLATELETCT 285  --   --  282 316   MPV 11.6  --   --  11.4 11.7    < > = values in this interval not displayed.     Recent Labs     10/28/21  0346 10/29/21  0346 10/30/21  0508   SODIUM 131* 130* 132*   POTASSIUM 4.2 4.7 4.0   CHLORIDE 98 99 100   CO2 21 22 20   GLUCOSE 176* 198* 128*   BUN 21 23* 20   CREATININE 1.21 1.30 1.22   CALCIUM 8.1* 8.1* 8.1*                   Imaging  [id number] STENT - TUBE CHANGE   Final Result      Digitized intraoperative radiograph is submitted for review.  This examination is not for diagnostic purpose but for guidance during a surgical procedure.      US-RUQ   Final Result         1.  Gallbladder sludge and cholelithiasis with gallbladder wall thickening and positive sonographic Cifuentes's sign, findings sonographically compatible with cholecystitis.   2.  Hepatomegaly and echogenic liver, compatible with fatty change versus fibrosis.      EC-ECHOCARDIOGRAM COMPLETE W/O CONT    (Results Pending)        Assessment/Plan  * Cholecystitis- (present on admission)  Assessment & Plan  IV Zosyn  ERCP with sphincterotomy and biliary stent placement 10/26/2021  Adjusted pain control  Laparoscopic cholecystectomy 10/28/2021  Encourage IS    Hepatitis- (present on admission)  Assessment & Plan  Unclear etiology  Ursodiol  Follow up with Gastroenterology     Bilateral lower extremity edema  Assessment & Plan  Echocardiogram pending    Chronic pain- (present on admission)  Assessment & Plan  Pain control    Hyponatremia- (present on admission)  Assessment & Plan  Follow cmp    Anemia- (present on admission)  Assessment & Plan  Transfused PRBC 1 u  Follow cbc    CAD S/P percutaneous coronary angioplasty- (present on admission)  Assessment &  Plan  Metoprolol  Hold ASA and Zetia      Anxiety- (present on admission)  Assessment & Plan  Zoloft, Xanax    Hypertension- (present on admission)  Assessment & Plan  Amlodipine, Metoprolol     RHEA (acute kidney injury) (HCC)- (present on admission)  Assessment & Plan  IVF hydration  Follow bmp    Uncontrolled type 1 diabetes mellitus (HCC)- (present on admission)  Assessment & Plan  Hold Semglee  SSI       VTE prophylaxis: SCDs/TEDs    I have performed a physical exam and reviewed and updated ROS and Plan today (10/30/2021). In review of yesterday's note (10/29/2021), there are no changes except as documented above.

## 2021-10-30 NOTE — PROGRESS NOTES
ACS Silver  POD2 lap peter  Doing much better today, feels like her discomfort has resolved now and she just has pain at her incisions. She is very happy  Afebrile, HR 70s  WBC 12 from 11.4  Tbili 7 from 8  ALBINO-590cc, SSG    Plan:  Appreciate medical care  dvt prophylaxis  Hepatitis w/u per GI  LFTs trending downward after peter, unclear etiology  Continue ALBINO drain to bulb suction  Will follow closely with you

## 2021-10-30 NOTE — CARE PLAN
The patient is Stable - Low risk of patient condition declining or worsening    Progress made toward(s) clinical / shift goals:      Problem: Knowledge Deficit - Standard  Goal: Patient and family/care givers will demonstrate understanding of plan of care, disease process/condition, diagnostic tests and medications  Outcome: Progressing  Note: Plan of care discussed with patient. Medication education provided. All questions addressed.     Problem: Pain - Standard  Goal: Alleviation of pain or a reduction in pain to the patient’s comfort goal  Outcome: Progressing  Note: Pain level frequently assessed. PRN medications administered per MAR. Patient reports reduction in pain level post medication administration.

## 2021-10-31 ENCOUNTER — APPOINTMENT (OUTPATIENT)
Dept: RADIOLOGY | Facility: MEDICAL CENTER | Age: 66
DRG: 418 | End: 2021-10-31
Attending: FAMILY MEDICINE
Payer: MEDICARE

## 2021-10-31 LAB
ALBUMIN SERPL BCP-MCNC: 2.3 G/DL (ref 3.2–4.9)
ALBUMIN/GLOB SERPL: 0.6 G/DL
ALP SERPL-CCNC: 1693 U/L (ref 30–99)
ALT SERPL-CCNC: 85 U/L (ref 2–50)
ANION GAP SERPL CALC-SCNC: 11 MMOL/L (ref 7–16)
AST SERPL-CCNC: 114 U/L (ref 12–45)
BASOPHILS # BLD AUTO: 0.8 % (ref 0–1.8)
BASOPHILS # BLD: 0.09 K/UL (ref 0–0.12)
BILIRUB SERPL-MCNC: 6.5 MG/DL (ref 0.1–1.5)
BUN SERPL-MCNC: 17 MG/DL (ref 8–22)
CALCIUM SERPL-MCNC: 8.3 MG/DL (ref 8.5–10.5)
CHLORIDE SERPL-SCNC: 100 MMOL/L (ref 96–112)
CO2 SERPL-SCNC: 21 MMOL/L (ref 20–33)
CREAT SERPL-MCNC: 1.01 MG/DL (ref 0.5–1.4)
EOSINOPHIL # BLD AUTO: 0.35 K/UL (ref 0–0.51)
EOSINOPHIL NFR BLD: 3 % (ref 0–6.9)
ERYTHROCYTE [DISTWIDTH] IN BLOOD BY AUTOMATED COUNT: 65 FL (ref 35.9–50)
GLOBULIN SER CALC-MCNC: 3.6 G/DL (ref 1.9–3.5)
GLUCOSE BLD-MCNC: 157 MG/DL (ref 65–99)
GLUCOSE BLD-MCNC: 173 MG/DL (ref 65–99)
GLUCOSE BLD-MCNC: 208 MG/DL (ref 65–99)
GLUCOSE SERPL-MCNC: 170 MG/DL (ref 65–99)
HCT VFR BLD AUTO: 25.1 % (ref 37–47)
HGB BLD-MCNC: 8 G/DL (ref 12–16)
IMM GRANULOCYTES # BLD AUTO: 0.09 K/UL (ref 0–0.11)
IMM GRANULOCYTES NFR BLD AUTO: 0.8 % (ref 0–0.9)
LYMPHOCYTES # BLD AUTO: 1.18 K/UL (ref 1–4.8)
LYMPHOCYTES NFR BLD: 10.2 % (ref 22–41)
MCH RBC QN AUTO: 33.3 PG (ref 27–33)
MCHC RBC AUTO-ENTMCNC: 31.9 G/DL (ref 33.6–35)
MCV RBC AUTO: 104.6 FL (ref 81.4–97.8)
MONOCYTES # BLD AUTO: 1.41 K/UL (ref 0–0.85)
MONOCYTES NFR BLD AUTO: 12.2 % (ref 0–13.4)
NEUTROPHILS # BLD AUTO: 8.48 K/UL (ref 2–7.15)
NEUTROPHILS NFR BLD: 73 % (ref 44–72)
NRBC # BLD AUTO: 0 K/UL
NRBC BLD-RTO: 0 /100 WBC
NT-PROBNP SERPL IA-MCNC: 3279 PG/ML (ref 0–125)
PLATELET # BLD AUTO: 361 K/UL (ref 164–446)
PMV BLD AUTO: 11.4 FL (ref 9–12.9)
POTASSIUM SERPL-SCNC: 4.7 MMOL/L (ref 3.6–5.5)
PROT SERPL-MCNC: 5.9 G/DL (ref 6–8.2)
RBC # BLD AUTO: 2.4 M/UL (ref 4.2–5.4)
SODIUM SERPL-SCNC: 132 MMOL/L (ref 135–145)
WBC # BLD AUTO: 11.6 K/UL (ref 4.8–10.8)

## 2021-10-31 PROCEDURE — 700102 HCHG RX REV CODE 250 W/ 637 OVERRIDE(OP): Performed by: STUDENT IN AN ORGANIZED HEALTH CARE EDUCATION/TRAINING PROGRAM

## 2021-10-31 PROCEDURE — 700102 HCHG RX REV CODE 250 W/ 637 OVERRIDE(OP): Performed by: FAMILY MEDICINE

## 2021-10-31 PROCEDURE — A9270 NON-COVERED ITEM OR SERVICE: HCPCS | Performed by: STUDENT IN AN ORGANIZED HEALTH CARE EDUCATION/TRAINING PROGRAM

## 2021-10-31 PROCEDURE — 80053 COMPREHEN METABOLIC PANEL: CPT

## 2021-10-31 PROCEDURE — 700105 HCHG RX REV CODE 258: Performed by: FAMILY MEDICINE

## 2021-10-31 PROCEDURE — 36415 COLL VENOUS BLD VENIPUNCTURE: CPT

## 2021-10-31 PROCEDURE — 85025 COMPLETE CBC W/AUTO DIFF WBC: CPT

## 2021-10-31 PROCEDURE — A9270 NON-COVERED ITEM OR SERVICE: HCPCS | Performed by: FAMILY MEDICINE

## 2021-10-31 PROCEDURE — 700111 HCHG RX REV CODE 636 W/ 250 OVERRIDE (IP): Performed by: FAMILY MEDICINE

## 2021-10-31 PROCEDURE — 770006 HCHG ROOM/CARE - MED/SURG/GYN SEMI*

## 2021-10-31 PROCEDURE — 99024 POSTOP FOLLOW-UP VISIT: CPT | Performed by: SURGERY

## 2021-10-31 PROCEDURE — 99232 SBSQ HOSP IP/OBS MODERATE 35: CPT | Performed by: FAMILY MEDICINE

## 2021-10-31 PROCEDURE — 83880 ASSAY OF NATRIURETIC PEPTIDE: CPT

## 2021-10-31 PROCEDURE — 82962 GLUCOSE BLOOD TEST: CPT | Mod: 91

## 2021-10-31 RX ORDER — INSULIN LISPRO 100 [IU]/ML
2-14 INJECTION, SOLUTION INTRAVENOUS; SUBCUTANEOUS EVERY 6 HOURS
Status: DISCONTINUED | OUTPATIENT
Start: 2021-10-31 | End: 2021-11-03 | Stop reason: HOSPADM

## 2021-10-31 RX ORDER — FUROSEMIDE 10 MG/ML
40 INJECTION INTRAMUSCULAR; INTRAVENOUS ONCE
Status: COMPLETED | OUTPATIENT
Start: 2021-10-31 | End: 2021-10-31

## 2021-10-31 RX ORDER — DEXTROSE MONOHYDRATE 25 G/50ML
50 INJECTION, SOLUTION INTRAVENOUS
Status: DISCONTINUED | OUTPATIENT
Start: 2021-10-31 | End: 2021-11-03 | Stop reason: HOSPADM

## 2021-10-31 RX ADMIN — LEVOTHYROXINE SODIUM 225 MCG: 0.12 TABLET ORAL at 04:44

## 2021-10-31 RX ADMIN — PIPERACILLIN SODIUM AND TAZOBACTAM SODIUM 3.38 G: 3; .375 INJECTION, POWDER, LYOPHILIZED, FOR SOLUTION INTRAVENOUS at 12:47

## 2021-10-31 RX ADMIN — OXYCODONE HYDROCHLORIDE 10 MG: 10 TABLET ORAL at 08:22

## 2021-10-31 RX ADMIN — INSULIN LISPRO 4 UNITS: 100 INJECTION, SOLUTION INTRAVENOUS; SUBCUTANEOUS at 11:30

## 2021-10-31 RX ADMIN — INSULIN LISPRO 2 UNITS: 100 INJECTION, SOLUTION INTRAVENOUS; SUBCUTANEOUS at 16:40

## 2021-10-31 RX ADMIN — MAGNESIUM HYDROXIDE 30 ML: 400 SUSPENSION ORAL at 04:43

## 2021-10-31 RX ADMIN — AMLODIPINE BESYLATE 5 MG: 10 TABLET ORAL at 16:35

## 2021-10-31 RX ADMIN — URSODIOL 300 MG: 300 CAPSULE ORAL at 04:45

## 2021-10-31 RX ADMIN — INSULIN LISPRO 2 UNITS: 100 INJECTION, SOLUTION INTRAVENOUS; SUBCUTANEOUS at 04:54

## 2021-10-31 RX ADMIN — METOPROLOL SUCCINATE 100 MG: 100 TABLET, EXTENDED RELEASE ORAL at 04:43

## 2021-10-31 RX ADMIN — HYDROMORPHONE HYDROCHLORIDE 1 MG: 1 INJECTION, SOLUTION INTRAMUSCULAR; INTRAVENOUS; SUBCUTANEOUS at 04:44

## 2021-10-31 RX ADMIN — PIPERACILLIN SODIUM AND TAZOBACTAM SODIUM 3.38 G: 3; .375 INJECTION, POWDER, LYOPHILIZED, FOR SOLUTION INTRAVENOUS at 04:43

## 2021-10-31 RX ADMIN — HYDROMORPHONE HYDROCHLORIDE 1 MG: 1 INJECTION, SOLUTION INTRAMUSCULAR; INTRAVENOUS; SUBCUTANEOUS at 15:20

## 2021-10-31 RX ADMIN — SODIUM CHLORIDE: 9 INJECTION, SOLUTION INTRAVENOUS at 04:47

## 2021-10-31 RX ADMIN — INSULIN LISPRO 2 UNITS: 100 INJECTION, SOLUTION INTRAVENOUS; SUBCUTANEOUS at 23:48

## 2021-10-31 RX ADMIN — HYDROMORPHONE HYDROCHLORIDE 1 MG: 1 INJECTION, SOLUTION INTRAMUSCULAR; INTRAVENOUS; SUBCUTANEOUS at 22:13

## 2021-10-31 RX ADMIN — OXYCODONE HYDROCHLORIDE 10 MG: 10 TABLET ORAL at 02:11

## 2021-10-31 RX ADMIN — PIPERACILLIN SODIUM AND TAZOBACTAM SODIUM 3.38 G: 3; .375 INJECTION, POWDER, LYOPHILIZED, FOR SOLUTION INTRAVENOUS at 20:35

## 2021-10-31 RX ADMIN — OXYCODONE HYDROCHLORIDE 10 MG: 10 TABLET ORAL at 12:46

## 2021-10-31 RX ADMIN — ALPRAZOLAM 0.25 MG: 0.25 TABLET ORAL at 23:51

## 2021-10-31 RX ADMIN — DOCUSATE SODIUM 50 MG AND SENNOSIDES 8.6 MG 2 TABLET: 8.6; 5 TABLET, FILM COATED ORAL at 04:43

## 2021-10-31 RX ADMIN — OMEPRAZOLE 20 MG: 20 CAPSULE, DELAYED RELEASE ORAL at 04:44

## 2021-10-31 RX ADMIN — OXYCODONE HYDROCHLORIDE 10 MG: 10 TABLET ORAL at 23:51

## 2021-10-31 RX ADMIN — OXYCODONE HYDROCHLORIDE 10 MG: 10 TABLET ORAL at 20:35

## 2021-10-31 RX ADMIN — SERTRALINE HYDROCHLORIDE 100 MG: 100 TABLET, FILM COATED ORAL at 16:35

## 2021-10-31 RX ADMIN — FUROSEMIDE 40 MG: 10 INJECTION, SOLUTION INTRAMUSCULAR; INTRAVENOUS at 09:12

## 2021-10-31 ASSESSMENT — ENCOUNTER SYMPTOMS
DIAPHORESIS: 0
COUGH: 0
VOMITING: 0
WHEEZING: 0
MYALGIAS: 0
NAUSEA: 0
HEARTBURN: 0
WEAKNESS: 1
PALPITATIONS: 0
FOCAL WEAKNESS: 0
SENSORY CHANGE: 0
FEVER: 0
SPEECH CHANGE: 0
FLANK PAIN: 0
NECK PAIN: 0
DIZZINESS: 0
HEADACHES: 0
ABDOMINAL PAIN: 1
CHILLS: 0
SHORTNESS OF BREATH: 0
NERVOUS/ANXIOUS: 1
BLURRED VISION: 0
BACK PAIN: 0
SORE THROAT: 0
DIARRHEA: 0

## 2021-10-31 ASSESSMENT — PAIN DESCRIPTION - PAIN TYPE
TYPE: ACUTE PAIN;SURGICAL PAIN
TYPE: ACUTE PAIN
TYPE: ACUTE PAIN;SURGICAL PAIN
TYPE: ACUTE PAIN
TYPE: ACUTE PAIN;SURGICAL PAIN
TYPE: ACUTE PAIN;SURGICAL PAIN

## 2021-10-31 NOTE — PROGRESS NOTES
ACS Silver  POD3 lap peter  Continues to feel better, lab trending in right direction  +nausea this am, was previously tolerating diet  Abdomen soft, TTP as appropriate, incisions c/d/i  Tbili 6.5 from 7    Plan:  67 y/o female with improving hyperbilirubinemia, POD3 from lap peter  1. Appreciate medical care  2. dvt prophylaxis  3. PRN analgesia  4. PRN antiemetics  5. F/u pathology  6. F/u with Dr. Trammell after discharge with outpatient CMP done just prior  7. Low fat diet  8. No heavy lifting 9<15lbs) for 6 weeks

## 2021-10-31 NOTE — PROGRESS NOTES
[facility] Medicine Daily Progress Note    Date of Service  10/31/2021    Chief Complaint  Anu Manuel is a 66 y.o. female admitted 10/24/2021 with cholecystitis.    Hospital Course  Admitted with cholecystitis, suspected choledocholithiasis. Gastroenterology and Surgery were both consulted on the case. Patient was started on empiric coverage with IV Zosyn. Patient underwent ERCP with biliary sphincterotomy and we are stent placement on 10/26/2021, there were no biliary stones noted.  Patient then underwent laparoscopic cholecystectomy on 10/28/2021.  Patient with history of hepatitis, with unclear etiology. Previous work-up for autoimmune hepatitis was negative. She had a liver biopsy done which did show features of large bile duct obstruction. 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    Interval Problem Update  Cholecystitis - pain better controlled  Hepatitis -bilirubin trending down  HTN - sbp 110-140  Diabetes - -210  RHEA - crea 1.0  Anemia - hgb 8.0    I have personally seen and examined the patient at bedside. I discussed the plan of care with patient and bedside RN.    Consultants/Specialty  general surgery and GI    Code Status  DNAR/DNI    Disposition  Patient is not medically cleared.   Anticipate discharge to to home with close outpatient follow-up.  I have placed the appropriate orders for post-discharge needs.    Review of Systems  Review of Systems   Constitutional: Positive for malaise/fatigue. Negative for chills, diaphoresis and fever.   HENT: Negative for congestion, hearing loss and sore throat.    Eyes: Negative for blurred vision.   Respiratory: Negative for cough, shortness of breath and wheezing.    Cardiovascular: Negative for chest pain, palpitations and leg swelling.   Gastrointestinal: Positive for abdominal pain. Negative for diarrhea, heartburn, nausea and vomiting.    Genitourinary: Negative for dysuria, flank pain and hematuria.   Musculoskeletal: Negative for back pain, joint pain, myalgias and neck pain.   Skin: Negative for rash.   Neurological: Positive for weakness. Negative for dizziness, sensory change, speech change, focal weakness and headaches.   Psychiatric/Behavioral: The patient is nervous/anxious.         Physical Exam  Temp:  [36.6 °C (97.8 °F)-37.2 °C (99 °F)] 36.8 °C (98.2 °F)  Pulse:  [68-82] 72  Resp:  [17-20] 20  BP: (118-140)/(64-85) 140/69  SpO2:  [92 %-96 %] 92 %    Physical Exam  Vitals and nursing note reviewed.   HENT:      Head: Normocephalic and atraumatic.      Nose: No congestion.      Mouth/Throat:      Mouth: Mucous membranes are moist.   Eyes:      General: Scleral icterus present.      Extraocular Movements: Extraocular movements intact.      Conjunctiva/sclera: Conjunctivae normal.   Cardiovascular:      Rate and Rhythm: Normal rate and regular rhythm.   Pulmonary:      Effort: Pulmonary effort is normal.      Breath sounds: Normal breath sounds.   Abdominal:      General: There is no distension.      Tenderness: There is abdominal tenderness (RUQ). There is no guarding or rebound.      Comments: ALBINO drain at RUQ   Musculoskeletal:      Cervical back: No tenderness.      Right lower leg: Edema present.      Left lower leg: Edema present.   Skin:     Coloration: Skin is jaundiced.   Neurological:      General: No focal deficit present.      Mental Status: She is alert and oriented to person, place, and time.      Cranial Nerves: No cranial nerve deficit.   Psychiatric:         Mood and Affect: Mood is anxious.         Fluids    Intake/Output Summary (Last 24 hours) at 10/31/2021 1120  Last data filed at 10/31/2021 0325  Gross per 24 hour   Intake 240 ml   Output 290 ml   Net -50 ml       Laboratory  Recent Labs     10/29/21  1820 10/30/21  0508 10/31/21  0415   WBC 11.4* 12.0* 11.6*   RBC 2.30* 2.39* 2.40*   HEMOGLOBIN 7.6* 7.9* 8.0*    HEMATOCRIT 23.9* 25.1* 25.1*   .9* 105.0* 104.6*   MCH 33.0 33.1* 33.3*   MCHC 31.8* 31.5* 31.9*   RDW 68.0* 68.4* 65.0*   PLATELETCT 282 316 361   MPV 11.4 11.7 11.4     Recent Labs     10/29/21  0346 10/30/21  0508 10/31/21  0415   SODIUM 130* 132* 132*   POTASSIUM 4.7 4.0 4.7   CHLORIDE 99 100 100   CO2 22 20 21   GLUCOSE 198* 128* 170*   BUN 23* 20 17   CREATININE 1.30 1.22 1.01   CALCIUM 8.1* 8.1* 8.3*                   Imaging  EC-ECHOCARDIOGRAM COMPLETE W/O CONT   Final Result      [id number] STENT - TUBE CHANGE   Final Result      Digitized intraoperative radiograph is submitted for review.  This examination is not for diagnostic purpose but for guidance during a surgical procedure.      US-RUQ   Final Result         1.  Gallbladder sludge and cholelithiasis with gallbladder wall thickening and positive sonographic Cifuentes's sign, findings sonographically compatible with cholecystitis.   2.  Hepatomegaly and echogenic liver, compatible with fatty change versus fibrosis.           Assessment/Plan  * Cholecystitis- (present on admission)  Assessment & Plan  IV Zosyn  ERCP with sphincterotomy and biliary stent placement 10/26/2021  Laparoscopic cholecystectomy 10/28/2021  Encourage IS, pain control, mobilize    Hepatitis- (present on admission)  Assessment & Plan  Unclear etiology  Ursodiol  Follow up with Gastroenterology     Bilateral lower extremity edema  Assessment & Plan  IV Lasix 40 mg x 1    Chronic pain- (present on admission)  Assessment & Plan  Pain control    Hyponatremia- (present on admission)  Assessment & Plan  Follow cmp    Anemia- (present on admission)  Assessment & Plan  Transfused PRBC 1 u  Follow cbc    CAD S/P percutaneous coronary angioplasty- (present on admission)  Assessment & Plan  Metoprolol  Hold ASA and Zetia      Anxiety- (present on admission)  Assessment & Plan  Zoloft, Xanax    Hypertension- (present on admission)  Assessment & Plan  Amlodipine, Metoprolol     RHEA  (acute kidney injury) (HCC)- (present on admission)  Assessment & Plan  Stop IVF hydration  Follow bmp    Uncontrolled type 1 diabetes mellitus (HCC)- (present on admission)  Assessment & Plan  Resume Semglee at 10 u AM dosing  SSI       VTE prophylaxis: SCDs/TEDs    I have performed a physical exam and reviewed and updated ROS and Plan today (10/31/2021). In review of yesterday's note (10/30/2021), there are no changes except as documented above.

## 2021-10-31 NOTE — CARE PLAN
The patient is Watcher - Medium risk of patient condition declining or worsening    Shift Goals  Clinical Goals: Pain control  Patient Goals: Pain control/rest    Progress made toward(s) clinical / shift goals:  Pt an 8/10, medicated per MAR, pt resting in bed    Patient is not progressing towards the following goals:          Problem: Knowledge Deficit - Standard  Goal: Patient and family/care givers will demonstrate understanding of plan of care, disease process/condition, diagnostic tests and medications  10/30/2021 2153 by Caro Lopes R.N.  Outcome: Progressing  10/30/2021 2153 by Caro Lopes R.N.  Outcome: Progressing     Problem: Pain - Standard  Goal: Alleviation of pain or a reduction in pain to the patient’s comfort goal  10/30/2021 2153 by Caro Lopes R.N.  Outcome: Progressing  10/30/2021 2153 by THEODORE Ag.N.  Outcome: Progressing     Problem: Fall Risk  Goal: Patient will remain free from falls  10/30/2021 2153 by Caro Lopes R.N.  Outcome: Progressing  10/30/2021 2153 by Caro Lopes R.N.  Outcome: Progressing

## 2021-11-01 ENCOUNTER — APPOINTMENT (OUTPATIENT)
Dept: RADIOLOGY | Facility: MEDICAL CENTER | Age: 66
DRG: 418 | End: 2021-11-01
Attending: FAMILY MEDICINE
Payer: MEDICARE

## 2021-11-01 ENCOUNTER — PATIENT OUTREACH (OUTPATIENT)
Dept: HEALTH INFORMATION MANAGEMENT | Facility: OTHER | Age: 66
End: 2021-11-01

## 2021-11-01 PROBLEM — R60.1 ANASARCA: Status: ACTIVE | Noted: 2021-10-29

## 2021-11-01 LAB
ALBUMIN SERPL BCP-MCNC: 2.5 G/DL (ref 3.2–4.9)
ALBUMIN/GLOB SERPL: 0.7 G/DL
ALP SERPL-CCNC: 1839 U/L (ref 30–99)
ALT SERPL-CCNC: 76 U/L (ref 2–50)
ANION GAP SERPL CALC-SCNC: 12 MMOL/L (ref 7–16)
AST SERPL-CCNC: 106 U/L (ref 12–45)
BASOPHILS # BLD AUTO: 1 % (ref 0–1.8)
BASOPHILS # BLD: 0.1 K/UL (ref 0–0.12)
BILIRUB SERPL-MCNC: 6.7 MG/DL (ref 0.1–1.5)
BUN SERPL-MCNC: 13 MG/DL (ref 8–22)
CALCIUM SERPL-MCNC: 8.2 MG/DL (ref 8.5–10.5)
CHLORIDE SERPL-SCNC: 98 MMOL/L (ref 96–112)
CO2 SERPL-SCNC: 23 MMOL/L (ref 20–33)
CREAT SERPL-MCNC: 1.03 MG/DL (ref 0.5–1.4)
EOSINOPHIL # BLD AUTO: 0.26 K/UL (ref 0–0.51)
EOSINOPHIL NFR BLD: 2.6 % (ref 0–6.9)
ERYTHROCYTE [DISTWIDTH] IN BLOOD BY AUTOMATED COUNT: 61.8 FL (ref 35.9–50)
GLOBULIN SER CALC-MCNC: 3.7 G/DL (ref 1.9–3.5)
GLUCOSE BLD-MCNC: 130 MG/DL (ref 65–99)
GLUCOSE BLD-MCNC: 140 MG/DL (ref 65–99)
GLUCOSE BLD-MCNC: 200 MG/DL (ref 65–99)
GLUCOSE BLD-MCNC: 334 MG/DL (ref 65–99)
GLUCOSE BLD-MCNC: 53 MG/DL (ref 65–99)
GLUCOSE SERPL-MCNC: 137 MG/DL (ref 65–99)
HCT VFR BLD AUTO: 27.3 % (ref 37–47)
HGB BLD-MCNC: 8.9 G/DL (ref 12–16)
IMM GRANULOCYTES # BLD AUTO: 0.09 K/UL (ref 0–0.11)
IMM GRANULOCYTES NFR BLD AUTO: 0.9 % (ref 0–0.9)
LYMPHOCYTES # BLD AUTO: 1.17 K/UL (ref 1–4.8)
LYMPHOCYTES NFR BLD: 11.7 % (ref 22–41)
MCH RBC QN AUTO: 32.7 PG (ref 27–33)
MCHC RBC AUTO-ENTMCNC: 32.6 G/DL (ref 33.6–35)
MCV RBC AUTO: 100.4 FL (ref 81.4–97.8)
MONOCYTES # BLD AUTO: 1.08 K/UL (ref 0–0.85)
MONOCYTES NFR BLD AUTO: 10.8 % (ref 0–13.4)
NEUTROPHILS # BLD AUTO: 7.33 K/UL (ref 2–7.15)
NEUTROPHILS NFR BLD: 73 % (ref 44–72)
NRBC # BLD AUTO: 0 K/UL
NRBC BLD-RTO: 0 /100 WBC
NT-PROBNP SERPL IA-MCNC: 4356 PG/ML (ref 0–125)
PLATELET # BLD AUTO: 380 K/UL (ref 164–446)
PMV BLD AUTO: 10.8 FL (ref 9–12.9)
POTASSIUM SERPL-SCNC: 3.5 MMOL/L (ref 3.6–5.5)
PROT SERPL-MCNC: 6.2 G/DL (ref 6–8.2)
RBC # BLD AUTO: 2.72 M/UL (ref 4.2–5.4)
SODIUM SERPL-SCNC: 133 MMOL/L (ref 135–145)
WBC # BLD AUTO: 10 K/UL (ref 4.8–10.8)

## 2021-11-01 PROCEDURE — 85025 COMPLETE CBC W/AUTO DIFF WBC: CPT

## 2021-11-01 PROCEDURE — 700102 HCHG RX REV CODE 250 W/ 637 OVERRIDE(OP): Performed by: FAMILY MEDICINE

## 2021-11-01 PROCEDURE — 82962 GLUCOSE BLOOD TEST: CPT | Mod: 91

## 2021-11-01 PROCEDURE — 700102 HCHG RX REV CODE 250 W/ 637 OVERRIDE(OP): Performed by: STUDENT IN AN ORGANIZED HEALTH CARE EDUCATION/TRAINING PROGRAM

## 2021-11-01 PROCEDURE — 770006 HCHG ROOM/CARE - MED/SURG/GYN SEMI*

## 2021-11-01 PROCEDURE — 80053 COMPREHEN METABOLIC PANEL: CPT

## 2021-11-01 PROCEDURE — A9270 NON-COVERED ITEM OR SERVICE: HCPCS | Performed by: FAMILY MEDICINE

## 2021-11-01 PROCEDURE — A9270 NON-COVERED ITEM OR SERVICE: HCPCS | Performed by: STUDENT IN AN ORGANIZED HEALTH CARE EDUCATION/TRAINING PROGRAM

## 2021-11-01 PROCEDURE — 36415 COLL VENOUS BLD VENIPUNCTURE: CPT

## 2021-11-01 PROCEDURE — 83880 ASSAY OF NATRIURETIC PEPTIDE: CPT

## 2021-11-01 PROCEDURE — 99233 SBSQ HOSP IP/OBS HIGH 50: CPT | Performed by: FAMILY MEDICINE

## 2021-11-01 PROCEDURE — 700111 HCHG RX REV CODE 636 W/ 250 OVERRIDE (IP): Performed by: FAMILY MEDICINE

## 2021-11-01 PROCEDURE — 99024 POSTOP FOLLOW-UP VISIT: CPT | Performed by: SURGERY

## 2021-11-01 RX ORDER — SPIRONOLACTONE 50 MG/1
25 TABLET, FILM COATED ORAL
Status: DISCONTINUED | OUTPATIENT
Start: 2021-11-01 | End: 2021-11-03 | Stop reason: HOSPADM

## 2021-11-01 RX ORDER — FUROSEMIDE 10 MG/ML
40 INJECTION INTRAMUSCULAR; INTRAVENOUS
Status: DISCONTINUED | OUTPATIENT
Start: 2021-11-01 | End: 2021-11-03 | Stop reason: HOSPADM

## 2021-11-01 RX ORDER — POTASSIUM CHLORIDE 20 MEQ/1
20 TABLET, EXTENDED RELEASE ORAL 2 TIMES DAILY
Status: DISCONTINUED | OUTPATIENT
Start: 2021-11-01 | End: 2021-11-03 | Stop reason: HOSPADM

## 2021-11-01 RX ADMIN — METOPROLOL SUCCINATE 100 MG: 100 TABLET, EXTENDED RELEASE ORAL at 04:27

## 2021-11-01 RX ADMIN — OXYCODONE HYDROCHLORIDE 10 MG: 10 TABLET ORAL at 14:00

## 2021-11-01 RX ADMIN — OMEPRAZOLE 20 MG: 20 CAPSULE, DELAYED RELEASE ORAL at 04:28

## 2021-11-01 RX ADMIN — HYDROMORPHONE HYDROCHLORIDE 1 MG: 1 INJECTION, SOLUTION INTRAMUSCULAR; INTRAVENOUS; SUBCUTANEOUS at 01:36

## 2021-11-01 RX ADMIN — OXYCODONE HYDROCHLORIDE 10 MG: 10 TABLET ORAL at 09:04

## 2021-11-01 RX ADMIN — DOCUSATE SODIUM 50 MG AND SENNOSIDES 8.6 MG 2 TABLET: 8.6; 5 TABLET, FILM COATED ORAL at 17:07

## 2021-11-01 RX ADMIN — POTASSIUM CHLORIDE 20 MEQ: 1500 TABLET, EXTENDED RELEASE ORAL at 08:30

## 2021-11-01 RX ADMIN — FUROSEMIDE 40 MG: 10 INJECTION, SOLUTION INTRAMUSCULAR; INTRAVENOUS at 08:30

## 2021-11-01 RX ADMIN — URSODIOL 300 MG: 300 CAPSULE ORAL at 04:28

## 2021-11-01 RX ADMIN — SERTRALINE HYDROCHLORIDE 100 MG: 100 TABLET, FILM COATED ORAL at 16:53

## 2021-11-01 RX ADMIN — POTASSIUM CHLORIDE 20 MEQ: 1500 TABLET, EXTENDED RELEASE ORAL at 16:53

## 2021-11-01 RX ADMIN — ALPRAZOLAM 0.25 MG: 0.25 TABLET ORAL at 20:09

## 2021-11-01 RX ADMIN — OXYCODONE HYDROCHLORIDE 10 MG: 10 TABLET ORAL at 20:09

## 2021-11-01 RX ADMIN — OXYCODONE HYDROCHLORIDE 10 MG: 10 TABLET ORAL at 04:28

## 2021-11-01 RX ADMIN — HYDROMORPHONE HYDROCHLORIDE 1 MG: 1 INJECTION, SOLUTION INTRAMUSCULAR; INTRAVENOUS; SUBCUTANEOUS at 14:52

## 2021-11-01 RX ADMIN — SPIRONOLACTONE 25 MG: 50 TABLET ORAL at 14:58

## 2021-11-01 RX ADMIN — INSULIN LISPRO 8 UNITS: 100 INJECTION, SOLUTION INTRAVENOUS; SUBCUTANEOUS at 23:25

## 2021-11-01 RX ADMIN — OXYCODONE HYDROCHLORIDE 10 MG: 10 TABLET ORAL at 17:07

## 2021-11-01 RX ADMIN — LEVOTHYROXINE SODIUM 225 MCG: 0.12 TABLET ORAL at 04:28

## 2021-11-01 SDOH — ECONOMIC STABILITY: FOOD INSECURITY: WITHIN THE PAST 12 MONTHS, YOU WORRIED THAT YOUR FOOD WOULD RUN OUT BEFORE YOU GOT MONEY TO BUY MORE.: NEVER TRUE

## 2021-11-01 SDOH — ECONOMIC STABILITY: FOOD INSECURITY: WITHIN THE PAST 12 MONTHS, THE FOOD YOU BOUGHT JUST DIDN'T LAST AND YOU DIDN'T HAVE MONEY TO GET MORE.: NEVER TRUE

## 2021-11-01 SDOH — ECONOMIC STABILITY: TRANSPORTATION INSECURITY
IN THE PAST 12 MONTHS, HAS THE LACK OF TRANSPORTATION KEPT YOU FROM MEDICAL APPOINTMENTS OR FROM GETTING MEDICATIONS?: NO

## 2021-11-01 SDOH — ECONOMIC STABILITY: TRANSPORTATION INSECURITY
IN THE PAST 12 MONTHS, HAS LACK OF TRANSPORTATION KEPT YOU FROM MEETINGS, WORK, OR FROM GETTING THINGS NEEDED FOR DAILY LIVING?: NO

## 2021-11-01 ASSESSMENT — ENCOUNTER SYMPTOMS
DIAPHORESIS: 0
NECK PAIN: 0
ABDOMINAL PAIN: 1
COUGH: 0
BLURRED VISION: 0
HEARTBURN: 0
DIARRHEA: 0
NAUSEA: 0
NERVOUS/ANXIOUS: 0
CHILLS: 0
FLANK PAIN: 0
WEAKNESS: 1
HEADACHES: 0
WHEEZING: 0
SENSORY CHANGE: 0
PALPITATIONS: 0
DIZZINESS: 0
BACK PAIN: 0
FOCAL WEAKNESS: 0
SPEECH CHANGE: 0
FEVER: 0
VOMITING: 0
SHORTNESS OF BREATH: 0
SORE THROAT: 0
MYALGIAS: 0

## 2021-11-01 ASSESSMENT — PAIN DESCRIPTION - PAIN TYPE
TYPE: ACUTE PAIN
TYPE: ACUTE PAIN
TYPE: ACUTE PAIN;SURGICAL PAIN
TYPE: ACUTE PAIN

## 2021-11-01 ASSESSMENT — SOCIAL DETERMINANTS OF HEALTH (SDOH): HOW HARD IS IT FOR YOU TO PAY FOR THE VERY BASICS LIKE FOOD, HOUSING, MEDICAL CARE, AND HEATING?: NOT VERY HARD

## 2021-11-01 NOTE — DISCHARGE PLANNING
.Anticipated Discharge Disposition:   Home    Action:   Pt was discussed in rounds today by IDT and per Dr Almanzar the plan is to continue to diurese Pt. Pt still has a  RLQ ALBINO drain.  Per Dr Milton today, plan is to discontinue ALBINO draiin once drain is <50 cc.    Barriers to Discharge:   Pending medical clearance    Plan:   CM to continue to assist Pt with discharge as needed

## 2021-11-01 NOTE — CARE PLAN
The patient is Stable - Low risk of patient condition declining or worsening    Shift Goals  Clinical Goals: pain control  Patient Goals: pain control  Family Goals: No family present    Progress made toward(s) clinical / shift goals:  Pt is resting comfortably in bed. Pain is being controlled with PRN and scheduled medications. Pt is able to utilize 0-10 pain scale.   Patient is not progressing towards the following goals:

## 2021-11-01 NOTE — PROGRESS NOTES
CHW Azeem went bedside to re-introduce community care management and geriatric specialty care. Patient accepted [facility] and reports no need for housing, transportation, and food assistance. Patient would like to speak to billing about a payment plan for hospital bills. Patient has no other concerns.     Community Health Worker Intake  • Social determinates of health intake complete.    • Identified barriers to none.   • Contact information provided to Anu Manuel   • Accepted Meds-To-Beds.   • Inpatient assessment completed.    Plan: CHW will send a referral to [facility] and follow up after DC.

## 2021-11-01 NOTE — PROGRESS NOTES
[facility] Medicine Daily Progress Note    Date of Service  11/1/2021    Chief Complaint  Anu Manuel is a 66 y.o. female admitted 10/24/2021 with cholecystitis.    Hospital Course  Admitted with cholecystitis, suspected choledocholithiasis. Gastroenterology and Surgery were both consulted on the case. Patient was started on empiric coverage with IV Zosyn. Patient underwent ERCP with biliary sphincterotomy and we are stent placement on 10/26/2021, there were no biliary stones noted.  Patient then underwent laparoscopic cholecystectomy on 10/28/2021.  Patient with history of hepatitis, with unclear etiology. Previous work-up for autoimmune hepatitis was negative. She had a liver biopsy done which did show features of large bile duct obstruction. 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    Interval Problem Update  Cholecystitis - pain better controlled, discussed case with Surgery  Hepatitis - LFTs stable  HTN - sbp 130-140  Diabetes - -200  RHEA - crea 1.0  Anemia - hgb 8.9  Anasarca - probnp 4300    I have personally seen and examined the patient at bedside. I discussed the plan of care with patient and bedside RN.    Consultants/Specialty  general surgery and GI    Code Status  DNAR/DNI    Disposition  Patient is not medically cleared.   Anticipate discharge to to home with close outpatient follow-up.  I have placed the appropriate orders for post-discharge needs.    Review of Systems  Review of Systems   Constitutional: Positive for malaise/fatigue. Negative for chills, diaphoresis and fever.   HENT: Negative for congestion, hearing loss and sore throat.    Eyes: Negative for blurred vision.   Respiratory: Negative for cough, shortness of breath and wheezing.    Cardiovascular: Negative for chest pain, palpitations and leg swelling.   Gastrointestinal: Positive for abdominal pain. Negative for  diarrhea, heartburn, nausea and vomiting.   Genitourinary: Negative for dysuria, flank pain and hematuria.   Musculoskeletal: Negative for back pain, joint pain, myalgias and neck pain.   Skin: Negative for rash.   Neurological: Positive for weakness. Negative for dizziness, sensory change, speech change, focal weakness and headaches.   Psychiatric/Behavioral: The patient is not nervous/anxious.         Physical Exam  Temp:  [36.2 °C (97.2 °F)-36.8 °C (98.2 °F)] 36.3 °C (97.3 °F)  Pulse:  [61-76] 71  Resp:  [16-18] 18  BP: (130-146)/(63-73) 130/68  SpO2:  [91 %-95 %] 95 %    Physical Exam  Vitals and nursing note reviewed.   HENT:      Head: Normocephalic and atraumatic.      Nose: No congestion.      Mouth/Throat:      Mouth: Mucous membranes are moist.   Eyes:      General: Scleral icterus present.      Extraocular Movements: Extraocular movements intact.      Conjunctiva/sclera: Conjunctivae normal.   Cardiovascular:      Rate and Rhythm: Normal rate and regular rhythm.   Pulmonary:      Effort: Pulmonary effort is normal.      Breath sounds: Normal breath sounds.   Abdominal:      General: There is no distension.      Tenderness: There is abdominal tenderness (RUQ). There is no guarding or rebound.      Comments: ALBINO drain at RUQ   Musculoskeletal:      Cervical back: No tenderness.      Right lower leg: Edema present.      Left lower leg: Edema present.   Skin:     Coloration: Skin is jaundiced.   Neurological:      General: No focal deficit present.      Mental Status: She is alert and oriented to person, place, and time.      Cranial Nerves: No cranial nerve deficit.   Psychiatric:         Mood and Affect: Mood is anxious.         Fluids    Intake/Output Summary (Last 24 hours) at 11/1/2021 1157  Last data filed at 11/1/2021 0800  Gross per 24 hour   Intake 360 ml   Output 320 ml   Net 40 ml       Laboratory  Recent Labs     10/30/21  0508 10/31/21  0415 11/01/21  0424   WBC 12.0* 11.6* 10.0   RBC 2.39* 2.40*  2.72*   HEMOGLOBIN 7.9* 8.0* 8.9*   HEMATOCRIT 25.1* 25.1* 27.3*   .0* 104.6* 100.4*   MCH 33.1* 33.3* 32.7   MCHC 31.5* 31.9* 32.6*   RDW 68.4* 65.0* 61.8*   PLATELETCT 316 361 380   MPV 11.7 11.4 10.8     Recent Labs     10/30/21  0508 10/31/21  0415 11/01/21  0424   SODIUM 132* 132* 133*   POTASSIUM 4.0 4.7 3.5*   CHLORIDE 100 100 98   CO2 20 21 23   GLUCOSE 128* 170* 137*   BUN 20 17 13   CREATININE 1.22 1.01 1.03   CALCIUM 8.1* 8.3* 8.2*                   Imaging  IR-US GUIDED PIV   Final Result    Ultrasound-guided PERIPHERAL IV INSERTION performed by    qualified nursing staff as above.      IR-US GUIDED PIV   Final Result    Ultrasound-guided PERIPHERAL IV INSERTION performed by    qualified nursing staff as above.      EC-ECHOCARDIOGRAM COMPLETE W/O CONT   Final Result      [id number] STENT - TUBE CHANGE   Final Result      Digitized intraoperative radiograph is submitted for review.  This examination is not for diagnostic purpose but for guidance during a surgical procedure.      US-RUQ   Final Result         1.  Gallbladder sludge and cholelithiasis with gallbladder wall thickening and positive sonographic Cifuentes's sign, findings sonographically compatible with cholecystitis.   2.  Hepatomegaly and echogenic liver, compatible with fatty change versus fibrosis.           Assessment/Plan  * Cholecystitis- (present on admission)  Assessment & Plan  Finished course of IV Zosyn  ERCP with sphincterotomy and biliary stent placement 10/26/2021  Laparoscopic cholecystectomy 10/28/2021  Encourage IS, pain control, mobilize    Hepatitis- (present on admission)  Assessment & Plan  Unclear etiology  Ursodiol  Follow up with Gastroenterology     Anasarca  Assessment & Plan  IV Lasix   Start Aldactone  Fluid restriction to 1 L per day    Chronic pain- (present on admission)  Assessment & Plan  Pain control    Hyponatremia- (present on admission)  Assessment & Plan  Follow cmp    Anemia- (present on  admission)  Assessment & Plan  Transfused PRBC 1 u  Follow cbc    CAD S/P percutaneous coronary angioplasty- (present on admission)  Assessment & Plan  Metoprolol  Hold ASA and Zetia      Anxiety- (present on admission)  Assessment & Plan  Zoloft, Xanax    Hypertension- (present on admission)  Assessment & Plan  Metoprolol   Hold Amlodipine    RHEA (acute kidney injury) (HCC)- (present on admission)  Assessment & Plan  Follow bmp    Uncontrolled type 1 diabetes mellitus (HCC)- (present on admission)  Assessment & Plan  Semglee, SSI       VTE prophylaxis: SCDs/TEDs    I have performed a physical exam and reviewed and updated ROS and Plan today (11/1/2021). In review of yesterday's note (10/31/2021), there are no changes except as documented above.

## 2021-11-01 NOTE — PROGRESS NOTES
Bedside report received.  Assessment complete.  A&O x 4. Patient calls appropriately.  Patient ambulates self.  Patient has 8/10 pain. Pain managed with prescribed medications.  Denies N&V. Tolerating diabetic diet.  3 lap sites dermabond CDI. RLQ ALBINO drain to bulb suction, dressing CDI.  + void, +/ flatus, last BM 10/27.  Patient denies SOB.  SCD's refused.  Review plan with of care with patient. Call light and personal belongings with in reach. Hourly rounding in place. All needs met at this time.

## 2021-11-01 NOTE — PROGRESS NOTES
"ACUTE CARE SURGERY SILVER TEAM    Postop day 4 after laparoscopic cholecystectomy for intractable biliary colic  Suspect cirrhosis  Continues to have high drain output  Relatively pain-free otherwise  Eating  Ambulating with some difficulty    /68   Pulse 71   Temp 36.3 °C (97.3 °F) (Temporal)   Resp 18   Ht 1.676 m (5' 6\")   Wt 66.1 kg (145 lb 11.6 oz)   LMP 04/29/2005 (LMP Unknown)   SpO2 95%   BMI 23.52 kg/m²     Drain output: 310 cc serosanguineous    Abdomen soft, nontender nondistended  Drain serosanguineous, cloudy white      Assessment and plan:  Patient seems to be doing reasonably well.  Slow improvement in bilirubin, but Alkaline phosphatase remains high: Trend labs.  Drain output still too high for removal  Drain fluid quality likely due to powdered coagulant use during surgery    Case discussed with hospitalist  Agree with diuresis/manage as cirrhosis  Plan to remove drain once less than 50 cc/day    Diet as tolerated    General management per medical service    "

## 2021-11-02 LAB
ALBUMIN SERPL BCP-MCNC: 2.4 G/DL (ref 3.2–4.9)
ALBUMIN/GLOB SERPL: 0.7 G/DL
ALP SERPL-CCNC: 1795 U/L (ref 30–99)
ALT SERPL-CCNC: 69 U/L (ref 2–50)
ANION GAP SERPL CALC-SCNC: 9 MMOL/L (ref 7–16)
AST SERPL-CCNC: 99 U/L (ref 12–45)
BASOPHILS # BLD AUTO: 0.8 % (ref 0–1.8)
BASOPHILS # BLD: 0.08 K/UL (ref 0–0.12)
BILIRUB SERPL-MCNC: 6.5 MG/DL (ref 0.1–1.5)
BUN SERPL-MCNC: 12 MG/DL (ref 8–22)
CALCIUM SERPL-MCNC: 8.2 MG/DL (ref 8.5–10.5)
CHLORIDE SERPL-SCNC: 96 MMOL/L (ref 96–112)
CO2 SERPL-SCNC: 28 MMOL/L (ref 20–33)
CREAT SERPL-MCNC: 0.87 MG/DL (ref 0.5–1.4)
EOSINOPHIL # BLD AUTO: 0.15 K/UL (ref 0–0.51)
EOSINOPHIL NFR BLD: 1.5 % (ref 0–6.9)
ERYTHROCYTE [DISTWIDTH] IN BLOOD BY AUTOMATED COUNT: 63 FL (ref 35.9–50)
GLOBULIN SER CALC-MCNC: 3.4 G/DL (ref 1.9–3.5)
GLUCOSE BLD-MCNC: 123 MG/DL (ref 65–99)
GLUCOSE BLD-MCNC: 184 MG/DL (ref 65–99)
GLUCOSE BLD-MCNC: 233 MG/DL (ref 65–99)
GLUCOSE SERPL-MCNC: 172 MG/DL (ref 65–99)
HCT VFR BLD AUTO: 24 % (ref 37–47)
HGB BLD-MCNC: 7.8 G/DL (ref 12–16)
IMM GRANULOCYTES # BLD AUTO: 0.07 K/UL (ref 0–0.11)
IMM GRANULOCYTES NFR BLD AUTO: 0.7 % (ref 0–0.9)
LYMPHOCYTES # BLD AUTO: 1.06 K/UL (ref 1–4.8)
LYMPHOCYTES NFR BLD: 10.8 % (ref 22–41)
MCH RBC QN AUTO: 33.1 PG (ref 27–33)
MCHC RBC AUTO-ENTMCNC: 32.5 G/DL (ref 33.6–35)
MCV RBC AUTO: 101.7 FL (ref 81.4–97.8)
MONOCYTES # BLD AUTO: 1.29 K/UL (ref 0–0.85)
MONOCYTES NFR BLD AUTO: 13.1 % (ref 0–13.4)
NEUTROPHILS # BLD AUTO: 7.21 K/UL (ref 2–7.15)
NEUTROPHILS NFR BLD: 73.1 % (ref 44–72)
NRBC # BLD AUTO: 0 K/UL
NRBC BLD-RTO: 0 /100 WBC
NT-PROBNP SERPL IA-MCNC: 4204 PG/ML (ref 0–125)
PLATELET # BLD AUTO: 389 K/UL (ref 164–446)
PMV BLD AUTO: 11 FL (ref 9–12.9)
POTASSIUM SERPL-SCNC: 3.8 MMOL/L (ref 3.6–5.5)
PROT SERPL-MCNC: 5.8 G/DL (ref 6–8.2)
RBC # BLD AUTO: 2.36 M/UL (ref 4.2–5.4)
SODIUM SERPL-SCNC: 133 MMOL/L (ref 135–145)
WBC # BLD AUTO: 9.9 K/UL (ref 4.8–10.8)

## 2021-11-02 PROCEDURE — 700111 HCHG RX REV CODE 636 W/ 250 OVERRIDE (IP): Performed by: FAMILY MEDICINE

## 2021-11-02 PROCEDURE — 99024 POSTOP FOLLOW-UP VISIT: CPT | Performed by: SURGERY

## 2021-11-02 PROCEDURE — 82962 GLUCOSE BLOOD TEST: CPT | Mod: 91

## 2021-11-02 PROCEDURE — 770006 HCHG ROOM/CARE - MED/SURG/GYN SEMI*

## 2021-11-02 PROCEDURE — 700102 HCHG RX REV CODE 250 W/ 637 OVERRIDE(OP): Performed by: FAMILY MEDICINE

## 2021-11-02 PROCEDURE — 700102 HCHG RX REV CODE 250 W/ 637 OVERRIDE(OP): Performed by: INTERNAL MEDICINE

## 2021-11-02 PROCEDURE — A9270 NON-COVERED ITEM OR SERVICE: HCPCS | Performed by: STUDENT IN AN ORGANIZED HEALTH CARE EDUCATION/TRAINING PROGRAM

## 2021-11-02 PROCEDURE — 83880 ASSAY OF NATRIURETIC PEPTIDE: CPT

## 2021-11-02 PROCEDURE — A9270 NON-COVERED ITEM OR SERVICE: HCPCS | Performed by: INTERNAL MEDICINE

## 2021-11-02 PROCEDURE — A9270 NON-COVERED ITEM OR SERVICE: HCPCS | Performed by: FAMILY MEDICINE

## 2021-11-02 PROCEDURE — 80053 COMPREHEN METABOLIC PANEL: CPT

## 2021-11-02 PROCEDURE — 700102 HCHG RX REV CODE 250 W/ 637 OVERRIDE(OP): Performed by: STUDENT IN AN ORGANIZED HEALTH CARE EDUCATION/TRAINING PROGRAM

## 2021-11-02 PROCEDURE — 85025 COMPLETE CBC W/AUTO DIFF WBC: CPT

## 2021-11-02 PROCEDURE — 99232 SBSQ HOSP IP/OBS MODERATE 35: CPT | Performed by: INTERNAL MEDICINE

## 2021-11-02 RX ORDER — HYDROMORPHONE HYDROCHLORIDE 1 MG/ML
0.5 INJECTION, SOLUTION INTRAMUSCULAR; INTRAVENOUS; SUBCUTANEOUS
Status: DISCONTINUED | OUTPATIENT
Start: 2021-11-02 | End: 2021-11-03 | Stop reason: HOSPADM

## 2021-11-02 RX ORDER — OXYCODONE HYDROCHLORIDE 10 MG/1
10 TABLET ORAL
Status: DISCONTINUED | OUTPATIENT
Start: 2021-11-02 | End: 2021-11-03 | Stop reason: HOSPADM

## 2021-11-02 RX ORDER — OXYCODONE HYDROCHLORIDE 5 MG/1
5 TABLET ORAL
Status: DISCONTINUED | OUTPATIENT
Start: 2021-11-02 | End: 2021-11-03 | Stop reason: HOSPADM

## 2021-11-02 RX ADMIN — METOPROLOL SUCCINATE 100 MG: 100 TABLET, EXTENDED RELEASE ORAL at 05:46

## 2021-11-02 RX ADMIN — URSODIOL 300 MG: 300 CAPSULE ORAL at 05:48

## 2021-11-02 RX ADMIN — LEVOTHYROXINE SODIUM 225 MCG: 0.12 TABLET ORAL at 05:47

## 2021-11-02 RX ADMIN — HYDROMORPHONE HYDROCHLORIDE 1 MG: 1 INJECTION, SOLUTION INTRAMUSCULAR; INTRAVENOUS; SUBCUTANEOUS at 04:22

## 2021-11-02 RX ADMIN — INSULIN LISPRO 2 UNITS: 100 INJECTION, SOLUTION INTRAVENOUS; SUBCUTANEOUS at 16:52

## 2021-11-02 RX ADMIN — ALPRAZOLAM 0.25 MG: 0.25 TABLET ORAL at 18:52

## 2021-11-02 RX ADMIN — TRAZODONE HYDROCHLORIDE 100 MG: 50 TABLET ORAL at 21:44

## 2021-11-02 RX ADMIN — SERTRALINE HYDROCHLORIDE 100 MG: 100 TABLET, FILM COATED ORAL at 16:48

## 2021-11-02 RX ADMIN — OMEPRAZOLE 20 MG: 20 CAPSULE, DELAYED RELEASE ORAL at 05:48

## 2021-11-02 RX ADMIN — OXYCODONE HYDROCHLORIDE 10 MG: 10 TABLET ORAL at 18:16

## 2021-11-02 RX ADMIN — POTASSIUM CHLORIDE 20 MEQ: 1500 TABLET, EXTENDED RELEASE ORAL at 05:48

## 2021-11-02 RX ADMIN — INSULIN LISPRO 4 UNITS: 100 INJECTION, SOLUTION INTRAVENOUS; SUBCUTANEOUS at 05:43

## 2021-11-02 RX ADMIN — OXYCODONE HYDROCHLORIDE 10 MG: 10 TABLET ORAL at 05:47

## 2021-11-02 RX ADMIN — OXYCODONE HYDROCHLORIDE 10 MG: 10 TABLET ORAL at 21:44

## 2021-11-02 RX ADMIN — FUROSEMIDE 40 MG: 10 INJECTION, SOLUTION INTRAMUSCULAR; INTRAVENOUS at 05:46

## 2021-11-02 RX ADMIN — OXYCODONE HYDROCHLORIDE 10 MG: 10 TABLET ORAL at 11:56

## 2021-11-02 RX ADMIN — POTASSIUM CHLORIDE 20 MEQ: 1500 TABLET, EXTENDED RELEASE ORAL at 16:48

## 2021-11-02 RX ADMIN — SPIRONOLACTONE 25 MG: 50 TABLET ORAL at 05:47

## 2021-11-02 ASSESSMENT — PAIN DESCRIPTION - PAIN TYPE
TYPE: ACUTE PAIN

## 2021-11-02 ASSESSMENT — ENCOUNTER SYMPTOMS
WEAKNESS: 0
VOMITING: 0
ABDOMINAL PAIN: 1
DIARRHEA: 0
NAUSEA: 0

## 2021-11-02 NOTE — PROGRESS NOTES
[facility] Medicine Daily Progress Note    Date of Service  11/2/2021    Chief Complaint  Anu Manuel is a 66 y.o. female admitted 10/24/2021 with cholecystitis.    Hospital Course  Admitted with cholecystitis, suspected choledocholithiasis. Gastroenterology and Surgery were both consulted on the case. Patient was started on empiric coverage with IV Zosyn. Patient underwent ERCP with biliary sphincterotomy and we are stent placement on 10/26/2021, there were no biliary stones noted.  Patient then underwent laparoscopic cholecystectomy on 10/28/2021.  Patient with history of hepatitis, with unclear etiology. Previous work-up for autoimmune hepatitis was negative. She had a liver biopsy done which did show features of large bile duct obstruction. 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    Interval Problem Update  Cholecystitis -pain is well controlled, started weaning protocol. She feels much better today, eating food, passing gas. Liver labs are slightly improved today. Drain remains in place with robust output. Discussed with GS and will remove tomorrow AM.    I have personally seen and examined the patient at bedside. I discussed the plan of care with patient and bedside RN.    Consultants/Specialty  general surgery and GI    Code Status  DNAR/DNI    Disposition  Patient is not medically cleared.   Anticipate discharge to to home with close outpatient follow-up.  I have placed the appropriate orders for post-discharge needs.    Review of Systems  Review of Systems   Constitutional: Negative for malaise/fatigue.   Cardiovascular: Negative for chest pain.   Gastrointestinal: Positive for abdominal pain (very mild). Negative for diarrhea, nausea and vomiting.   Genitourinary: Negative for dysuria.   Neurological: Negative for weakness.   All other systems reviewed and are negative.       Physical Exam  Temp:   [36.3 °C (97.3 °F)-36.4 °C (97.5 °F)] 36.4 °C (97.5 °F)  Pulse:  [69-74] 70  Resp:  [16-18] 17  BP: (126-146)/(58-84) 135/84  SpO2:  [92 %-94 %] 94 %    Physical Exam  Vitals and nursing note reviewed.   Constitutional:       Comments: Much more vibrant appearing today   HENT:      Head: Normocephalic and atraumatic.      Nose: No congestion.      Mouth/Throat:      Mouth: Mucous membranes are moist.   Eyes:      General: Scleral icterus present.      Extraocular Movements: Extraocular movements intact.      Conjunctiva/sclera: Conjunctivae normal.   Cardiovascular:      Rate and Rhythm: Normal rate and regular rhythm.   Pulmonary:      Effort: Pulmonary effort is normal.      Breath sounds: Normal breath sounds.   Abdominal:      General: There is no distension.      Tenderness: There is abdominal tenderness (RUQ, mild). There is no guarding or rebound.      Comments: ALBINO drain at RUQ, mostly sergosanguinous fluid   Musculoskeletal:      Cervical back: No tenderness.      Right lower leg: Edema present.      Left lower leg: Edema present.      Comments: 1-2+ pitting edema B/L LE's   Skin:     Coloration: Skin is jaundiced.   Neurological:      General: No focal deficit present.      Mental Status: She is alert and oriented to person, place, and time.      Cranial Nerves: No cranial nerve deficit.         Fluids    Intake/Output Summary (Last 24 hours) at 11/2/2021 1546  Last data filed at 11/2/2021 1400  Gross per 24 hour   Intake 600 ml   Output 400 ml   Net 200 ml       Laboratory  Recent Labs     10/31/21  0415 11/01/21  0424 11/02/21  0441   WBC 11.6* 10.0 9.9   RBC 2.40* 2.72* 2.36*   HEMOGLOBIN 8.0* 8.9* 7.8*   HEMATOCRIT 25.1* 27.3* 24.0*   .6* 100.4* 101.7*   MCH 33.3* 32.7 33.1*   MCHC 31.9* 32.6* 32.5*   RDW 65.0* 61.8* 63.0*   PLATELETCT 361 380 389   MPV 11.4 10.8 11.0     Recent Labs     10/31/21  0415 11/01/21  0424 11/02/21  0441   SODIUM 132* 133* 133*   POTASSIUM 4.7 3.5* 3.8   CHLORIDE 100  98 96   CO2 21 23 28   GLUCOSE 170* 137* 172*   BUN 17 13 12   CREATININE 1.01 1.03 0.87   CALCIUM 8.3* 8.2* 8.2*                   Imaging  IR-US GUIDED PIV   Final Result    Ultrasound-guided PERIPHERAL IV INSERTION performed by    qualified nursing staff as above.      IR-US GUIDED PIV   Final Result    Ultrasound-guided PERIPHERAL IV INSERTION performed by    qualified nursing staff as above.      EC-ECHOCARDIOGRAM COMPLETE W/O CONT   Final Result      [id number] STENT - TUBE CHANGE   Final Result      Digitized intraoperative radiograph is submitted for review.  This examination is not for diagnostic purpose but for guidance during a surgical procedure.      US-RUQ   Final Result         1.  Gallbladder sludge and cholelithiasis with gallbladder wall thickening and positive sonographic Cifuentes's sign, findings sonographically compatible with cholecystitis.   2.  Hepatomegaly and echogenic liver, compatible with fatty change versus fibrosis.           Assessment/Plan  * Cholecystitis- (present on admission)  Assessment & Plan  Finished course of IV Zosyn  ERCP with sphincterotomy and biliary stent placement 10/26/2021  Laparoscopic cholecystectomy 10/28/2021  Encourage IS, pain control, mobilize  -drain to be removed on 11/3 and then discharge home after that  -clinically improving    Anasarca- (present on admission)  Assessment & Plan  IV Lasix, will transition to oral lasix on discharge  Continue aldactone  -unclear how adherent patient will be to this at home  -2/2 liver disease  Fluid restriction to 1 L per day    Chronic pain- (present on admission)  Assessment & Plan  Pain control    Hepatitis- (present on admission)  Assessment & Plan  Unclear etiology  Ursodiol  Follow up with Gastroenterology and [facility] hepatology, clinic, reinforced with patient the importance of this and she UNDERSTANDS and verbalizes back to me  -Liver labs somewhat better today, but overall stable    Hyponatremia- (present on  admission)  Assessment & Plan  Follow cmp, at her baseline, 2/2 underlying liver disease    Anemia- (present on admission)  Assessment & Plan  Transfused PRBC 1 u  Follow cbc    CAD S/P percutaneous coronary angioplasty- (present on admission)  Assessment & Plan  Metoprolol  Hold ASA and Zetia      Anxiety- (present on admission)  Assessment & Plan  Zoloft, Xanax    Hypertension- (present on admission)  Assessment & Plan  Metoprolol   Hold Amlodipine    RHEA (acute kidney injury) (HCC)- (present on admission)  Assessment & Plan  Resolved    Uncontrolled type 1 diabetes mellitus (HCC)- (present on admission)  Assessment & Plan  Semglee, SSI, labile, no adjustments needed at this time       VTE prophylaxis: SCDs/TEDs    I have performed a physical exam and reviewed and updated ROS and Plan today (11/2/2021). In review of yesterday's note (11/1/2021), there are no changes except as documented above.

## 2021-11-02 NOTE — PROGRESS NOTES
Assessment complete, pt A&Ox4.    O2>90 on room air.    Pt up self to the bathroom, + void, +BM.    Pt endorses minimal pain, resting in bed, recently medicated per MAR. Denies nausea.    Abdomen soft, LAP x3 with dermabond CDI. JPx1 to RLQ with serosanguinous output, dressing CDI.    Call bell within reach, bed locked in lowest position.    Covid 19 surge in effect.

## 2021-11-02 NOTE — PROGRESS NOTES
Pt AO x 4  Vitals signs stable   Pt denies  chest pain or SOB  O2 sat >90% on RA breathing unlabored  Pt endorses moderate pain.   Pt denies N/V/D  + voiding, + flatus, +bowel sounds  Pt ambulates self   SCDs off   ALBINO drain to RUQ with sanguinous output. CDI.     Updated plan of care discussed with pt. Safety education done. Falls precautions in place.   Pt safety maintained. Hourly rounding done.

## 2021-11-02 NOTE — PROGRESS NOTES
"ACUTE CARE SURGERY SILVER TEAM    Doing well  Drain output remains high  Less pain  Ambulating      /84   Pulse 70   Temp 36.4 °C (97.5 °F) (Temporal)   Resp 17   Ht 1.676 m (5' 6\")   Wt 66.1 kg (145 lb 11.6 oz)   LMP 04/29/2005 (LMP Unknown)   SpO2 94%   BMI 23.52 kg/m²     Drain 355 cc    Abdomen soft, nontender nondistended  Incisions healing well  Drain output serosanguineous    Assessment and plan:  66-year-old likely cirrhotic with grossly abnormal LFTs.  Doing reasonably well after laparoscopic cholecystectomy.  Optimize medical management for her ascites  Plan to remove drain in a.m..  May need suture or Dermabond at site.    Primary management per medical team  "

## 2021-11-03 ENCOUNTER — PHARMACY VISIT (OUTPATIENT)
Dept: PHARMACY | Facility: MEDICAL CENTER | Age: 66
End: 2021-11-03
Payer: COMMERCIAL

## 2021-11-03 VITALS
WEIGHT: 145.72 LBS | HEIGHT: 66 IN | BODY MASS INDEX: 23.42 KG/M2 | HEART RATE: 71 BPM | OXYGEN SATURATION: 96 % | DIASTOLIC BLOOD PRESSURE: 81 MMHG | RESPIRATION RATE: 17 BRPM | TEMPERATURE: 97.3 F | SYSTOLIC BLOOD PRESSURE: 136 MMHG

## 2021-11-03 LAB
GLUCOSE BLD-MCNC: 135 MG/DL (ref 65–99)
GLUCOSE BLD-MCNC: 218 MG/DL (ref 65–99)
GLUCOSE BLD-MCNC: 342 MG/DL (ref 65–99)

## 2021-11-03 PROCEDURE — A9270 NON-COVERED ITEM OR SERVICE: HCPCS | Performed by: STUDENT IN AN ORGANIZED HEALTH CARE EDUCATION/TRAINING PROGRAM

## 2021-11-03 PROCEDURE — 82962 GLUCOSE BLOOD TEST: CPT | Mod: 91

## 2021-11-03 PROCEDURE — 700102 HCHG RX REV CODE 250 W/ 637 OVERRIDE(OP): Performed by: FAMILY MEDICINE

## 2021-11-03 PROCEDURE — 700111 HCHG RX REV CODE 636 W/ 250 OVERRIDE (IP): Performed by: FAMILY MEDICINE

## 2021-11-03 PROCEDURE — RXMED WILLOW AMBULATORY MEDICATION CHARGE: Performed by: INTERNAL MEDICINE

## 2021-11-03 PROCEDURE — A9270 NON-COVERED ITEM OR SERVICE: HCPCS | Performed by: FAMILY MEDICINE

## 2021-11-03 PROCEDURE — 700102 HCHG RX REV CODE 250 W/ 637 OVERRIDE(OP): Performed by: STUDENT IN AN ORGANIZED HEALTH CARE EDUCATION/TRAINING PROGRAM

## 2021-11-03 PROCEDURE — 99239 HOSP IP/OBS DSCHRG MGMT >30: CPT | Performed by: INTERNAL MEDICINE

## 2021-11-03 PROCEDURE — 99024 POSTOP FOLLOW-UP VISIT: CPT | Performed by: SURGERY

## 2021-11-03 RX ORDER — OXYCODONE HYDROCHLORIDE 5 MG/1
5 TABLET ORAL
Qty: 10 TABLET | Refills: 0 | Status: SHIPPED | OUTPATIENT
Start: 2021-11-03 | End: 2021-11-06

## 2021-11-03 RX ORDER — FUROSEMIDE 40 MG/1
40 TABLET ORAL DAILY
Qty: 30 TABLET | Refills: 1 | Status: SHIPPED | OUTPATIENT
Start: 2021-11-03 | End: 2022-07-10

## 2021-11-03 RX ORDER — SPIRONOLACTONE 25 MG/1
25 TABLET ORAL DAILY
Qty: 30 TABLET | Refills: 1 | Status: SHIPPED | OUTPATIENT
Start: 2021-11-04 | End: 2022-07-10

## 2021-11-03 RX ADMIN — OMEPRAZOLE 20 MG: 20 CAPSULE, DELAYED RELEASE ORAL at 06:21

## 2021-11-03 RX ADMIN — POTASSIUM CHLORIDE 20 MEQ: 1500 TABLET, EXTENDED RELEASE ORAL at 06:21

## 2021-11-03 RX ADMIN — URSODIOL 300 MG: 300 CAPSULE ORAL at 06:21

## 2021-11-03 RX ADMIN — SPIRONOLACTONE 25 MG: 50 TABLET ORAL at 06:21

## 2021-11-03 RX ADMIN — LEVOTHYROXINE SODIUM 225 MCG: 0.12 TABLET ORAL at 06:22

## 2021-11-03 RX ADMIN — INSULIN LISPRO 4 UNITS: 100 INJECTION, SOLUTION INTRAVENOUS; SUBCUTANEOUS at 06:19

## 2021-11-03 RX ADMIN — INSULIN LISPRO 8 UNITS: 100 INJECTION, SOLUTION INTRAVENOUS; SUBCUTANEOUS at 00:24

## 2021-11-03 RX ADMIN — FUROSEMIDE 40 MG: 10 INJECTION, SOLUTION INTRAMUSCULAR; INTRAVENOUS at 06:22

## 2021-11-03 RX ADMIN — METOPROLOL SUCCINATE 100 MG: 100 TABLET, EXTENDED RELEASE ORAL at 06:22

## 2021-11-03 NOTE — DISCHARGE SUMMARY
Discharge Summary    CHIEF COMPLAINT ON ADMISSION  Chief Complaint   Patient presents with   • Abdominal Pain       Reason for Admission  EMS     Admission Date  10/24/2021    CODE STATUS  DNAR/DNI    HPI & HOSPITAL COURSE  This is a 66 y.o. female here with h/o idiopathic hepatitis who presented to our hospital with progressive abdominal pain concerning for ongoing gallbladder pathology. GI and general surgery were consulted. Extensive liver labs were ordered. Elevated liver enzymes in a cholestatic pattern. She underwent ERCP with the following findings:    Impressions:  1.  Normal-appearing bile duct status post ERCP sphincterotomy balloon sweep cytology brushing and placement of a prophylactic 10 Danish by 5 cm plastic biliary stent for biliary decompression  2.  Gastric biopsies taken  3.  J-shaped stomach    She tolerated the procedure well. Biopsy results were non malignant with no e/o dysplasia. Patient underwent the following procedure:    PREOPERATIVE DIAGNOSES:  Chronic cholecystitis plus stone.     POSTOPERATIVE DIAGNOSES:  Chronic cholecystitis plus stone plus hepatic   congestion.     OPERATION:  Laparoscopic cholecystectomy.    Patient had some mild post operative bleeding that quickly subsided. Her drain continued to put out fluid consistent with her underlying hepatic congestion. She was diuresed with IV lasix and oral aldactone to good effect. She was extensively counseled on the importance of a low Na+ diet. Pathology from [facility] peter was negative for malignancy including benign lymph node as well. Patient was also counseled on the importance of follow up with a hepatologist in [place] and establishing care with a doctor at [facility] for potential transplant consideration. The etiology of her underlying liver disease remains idiopathic at this time. She was able to tolerate an oral intake without issue and her ALBINO drain was removed without issue. She received 5 days of empiric IV zosyn with negative  culture data and no clear evidence of infection on discharge.       No notes on file    Therefore, she is discharged in fair and stable condition to home with close outpatient follow-up.    The patient met 2-midnight criteria for an inpatient stay at the time of discharge.    Discharge Date  11/3/2021    FOLLOW UP ITEMS POST DISCHARGE  Patient needs follow up with Dr. Hayden Zavala at [facility] and needs to call [facility] to make an appointment with Dr. Arjun Wagner [phone] ASAP  -F/U with her PCP in 1-2 weeks    DISCHARGE DIAGNOSES  Principal Problem:    Cholecystitis POA: Yes  Active Problems:    Uncontrolled type 1 diabetes mellitus (HCC) POA: Yes      Overview: ICD-10 transition    RHEA (acute kidney injury) (HCC) POA: Yes    Hypertension POA: Yes    Anxiety POA: Yes    CAD S/P percutaneous coronary angioplasty POA: Yes    Anemia POA: Yes    Hyponatremia POA: Yes    Hepatitis POA: Yes    Chronic pain POA: Yes    Anasarca POA: Yes  Resolved Problems:    * No resolved hospital problems. *      FOLLOW UP  No future appointments.  Tello Trammell M.D.  [address]  [place] [id number] [phone]    In 2 weeks  For wound re-check    [facility]  [phone]  Call        MEDICATIONS ON DISCHARGE     Medication List      START taking these medications      Instructions   furosemide 40 MG Tabs  Commonly known as: LASIX   Take 1 Tablet by mouth every day.  Dose: 40 mg     oxyCODONE immediate-release 5 MG Tabs  Commonly known as: ROXICODONE  Replaces: oxycodone 5 MG capsule   Take 1 Tablet by mouth every 3 hours as needed for up to 3 days.  Dose: 5 mg     spironolactone 25 MG Tabs  Start taking on: November 4, 2021  Commonly known as: ALDACTONE   Take 1 Tablet by mouth every day.  Dose: 25 mg        CONTINUE taking these medications      Instructions   ALPRAZolam 0.5 MG Tabs  Commonly known as: XANAX   Take 0.5 mg by mouth at bedtime.  Dose: 0.5 mg     aspirin 81 MG  EC tablet   Take 1 Tab by mouth every morning.  Dose: 81 mg     diphenhydrAMINE 25 MG capsule  Commonly known as: Benadryl Allergy   Take 1 Capsule by mouth every 6 hours as needed for Itching.  Dose: 25 mg     FreeStyle John 14 Day Sensor Misc   1 MISCELLANEOUS E10.42 CHANGE SENSOR EVERY 14 DAYS   E11.65     metoprolol 200 MG XL tablet  Commonly known as: TOPROL-XL   Take 1 tablet by mouth every day.  Dose: 200 mg     NovoLOG FlexPen 100 UNIT/ML injection PEN  Generic drug: insulin aspart   Inject 1-5 Units under the skin in the morning, at noon, and at bedtime. 1 units for every 5 g of carbs   AND  Sliding Scale   150 - 200 = 1 units  201 - 250 = 2 units  251 - 300 = 3 units  301 - 350 = 4 units  351 - 400 = 5 units  Dose: 1-5 Units     ondansetron 4 MG Tbdp  Commonly known as: Zofran ODT   Take 1 Tablet by mouth every 6 hours as needed for Nausea.  Dose: 4 mg     pantoprazole 40 MG Tbec  Commonly known as: PROTONIX   Take 40 mg by mouth every morning.  Dose: 40 mg     pyridoxine 50 MG Tabs  Commonly known as: VITAMIN B-6   Take 50 mg by mouth every morning.  Dose: 50 mg     QC Vitamin D3 5000 Unit (125 mcg) Tabs  Generic drug: vitamin D3   Take 5,000 Units by mouth 2 times a day.  Dose: 5,000 Units     sertraline 100 MG Tabs  Commonly known as: Zoloft   Take 100 mg by mouth every evening.  Dose: 100 mg     STOOL SOFTENER PO   Take 1 Tablet by mouth every morning.  Dose: 1 Tablet     * levothyroxine 25 MCG Tabs  Commonly known as: SYNTHROID   Take 25 mcg by mouth every morning on an empty stomach. 25mcg tablet + 200mcg tablet for a total dose of 225mcg  Dose: 25 mcg     * Synthroid 200 MCG Tabs  Generic drug: levothyroxine   Take 200 mcg by mouth every morning. 200mcg tablet + 25mcg tablet for a total dose of 225mcg  Dose: 200 mcg     traZODone 100 MG Tabs  Commonly known as: DESYREL   Take 100 mg by mouth at bedtime as needed for Sleep.  Dose: 100 mg     Tresiba FlexTouch 100 UNIT/ML Sopn  Generic drug: Insulin  Degludec   Inject 24 Units under the skin every evening.  Dose: 24 Units     ursodiol 300 MG Caps  Commonly known as: ACTIGALL   Take 1 Capsule by mouth every day.  Dose: 300 mg     VITAMIN B COMPLEX PO   Take 1 Tablet by mouth every morning.  Dose: 1 Tablet     vitamin C 250 MG Tabs   Take 250 mg by mouth every morning.  Dose: 250 mg         * This list has 2 medication(s) that are the same as other medications prescribed for you. Read the directions carefully, and ask your doctor or other care provider to review them with you.            STOP taking these medications    amLODIPine 10 MG Tabs  Commonly known as: NORVASC     ezetimibe 10 MG Tabs  Commonly known as: ZETIA     famotidine 20 MG Tabs  Commonly known as: PEPCID     fentaNYL 25 MCG/HR Pt72  Commonly known as: DURAGESIC     oxycodone 5 MG capsule  Replaced by: oxyCODONE immediate-release 5 MG Tabs            Allergies  Allergies   Allergen Reactions   • Tape Unspecified     Blisters, paper tape is ok       DIET  Orders Placed This Encounter   Procedures   • Diet Order Diet: Consistent CHO (Diabetic); Fluid modifications: (optional): 1000 ml Fluid Restriction     Standing Status:   Standing     Number of Occurrences:   1     Order Specific Question:   Diet:     Answer:   Consistent CHO (Diabetic) [4]     Order Specific Question:   Fluid modifications: (optional)     Answer:   1000 ml Fluid Restriction [7]       ACTIVITY  As tolerated.  Weight bearing as tolerated    CONSULTATIONS  GI, surgery    PROCEDURES  As noted above    IR-US GUIDED PIV   Final Result    Ultrasound-guided PERIPHERAL IV INSERTION performed by    qualified nursing staff as above.      IR-US GUIDED PIV   Final Result    Ultrasound-guided PERIPHERAL IV INSERTION performed by    qualified nursing staff as above.      EC-ECHOCARDIOGRAM COMPLETE W/O CONT   Final Result      [id number] STENT - TUBE CHANGE   Final Result      Digitized intraoperative radiograph is submitted for review.   This examination is not for diagnostic purpose but for guidance during a surgical procedure.      US-RUQ   Final Result         1.  Gallbladder sludge and cholelithiasis with gallbladder wall thickening and positive sonographic Cifuentes's sign, findings sonographically compatible with cholecystitis.   2.  Hepatomegaly and echogenic liver, compatible with fatty change versus fibrosis.              LABORATORY  Lab Results   Component Value Date    SODIUM 133 (L) 11/02/2021    POTASSIUM 3.8 11/02/2021    CHLORIDE 96 11/02/2021    CO2 28 11/02/2021    GLUCOSE 172 (H) 11/02/2021    BUN 12 11/02/2021    CREATININE 0.87 11/02/2021    CREATININE 1.0 01/08/2009        Lab Results   Component Value Date    WBC 9.9 11/02/2021    HEMOGLOBIN 7.8 (L) 11/02/2021    HEMATOCRIT 24.0 (L) 11/02/2021    PLATELETCT 389 11/02/2021        Total time of the discharge process exceeds 43 minutes.

## 2021-11-03 NOTE — PROGRESS NOTES
DC instructions given and signed, ALBINO lockhart and NIECY georges prior to transfer to [facility], awaiting ride at this time.

## 2021-11-03 NOTE — PROGRESS NOTES
AA&Ox4.    SpO2 96% on RA. Denies SOB.    Reporting 0/10 pain. Medicated per MAR.    SKIN Lap sites x 3 with dermabond. MATTHEW, CDI. Dr. Milton at bedside removing drain. 1 stitch placed.     Tolerating Diabetic diet. Denies N/V.    + void.    + BM / LBM 11/02/2021.    Pt ambulates/up with no assistance needed.    All needs met at this time. Call light within reach. Pt calls appropriately.    Surge in Effect.

## 2021-11-03 NOTE — PROGRESS NOTES
"ACUTE CARE SURGERY SILVER TEAM    Patient eager for discharge  Tolerating diet  LFTs essentially unchanged    /81   Pulse 71   Temp 36.3 °C (97.3 °F) (Temporal)   Resp 17   Ht 1.676 m (5' 6\")   Wt 66.1 kg (145 lb 11.6 oz)   LMP 04/29/2005 (LMP Unknown)   SpO2 96%   BMI 23.52 kg/m²     Drain output: 290 serosanguineous    Remains jaundiced  Abdomen soft nontender  Incisions CDI with Dermabond    Drain site prepped with chlorhexidine and infiltrated with 1% lidocaine for local anesthetic.  Drain stitch, and drain removed.  Site closed with 3-0 nylon in a figure-of-eight fashion.  Sterile dressing placed.    Assessment and plan:  66-year-old female with liver disease of uncertain etiology status post laparoscopic cholecystectomy.  Ascites need to be medically managed.  Drain removed prior to discharge to decrease risk of infection.    Follow-up with Dr. Trammell 1 to 2 weeks  Follow-up with PCP  Patient needs follow-up for her liver disease    Patient counseled regarding wound and drain site care      "

## 2021-11-03 NOTE — DISCHARGE INSTRUCTIONS
Discharge Instructions    Discharged to home by car with relative. Discharged via wheelchair, hospital escort: Yes.  Special equipment needed: Not Applicable    Be sure to schedule a follow-up appointment with your primary care doctor or any specialists as instructed.     Discharge Plan:   Influenza Vaccine Indication: Not indicated: Previously immunized this influenza season and > 8 years of age    I understand that a diet low in cholesterol, fat, and sodium is recommended for good health. Unless I have been given specific instructions below for another diet, I accept this instruction as my diet prescription.   Other diet: as instructed    Special Instructions:     Follow-up with Dr. Trammell 1 to 2 weeks  Follow-up with PCP  Patient needs follow-up for her liver disease    · Is patient discharged on Warfarin / Coumadin?   No     Depression / Suicide Risk    As you are discharged from this [facility] facility, it is important to learn how to keep safe from harming yourself.    Recognize the warning signs:  · Abrupt changes in personality, positive or negative- including increase in energy   · Giving away possessions  · Change in eating patterns- significant weight changes-  positive or negative  · Change in sleeping patterns- unable to sleep or sleeping all the time   · Unwillingness or inability to communicate  · Depression  · Unusual sadness, discouragement and loneliness  · Talk of wanting to die  · Neglect of personal appearance   · Rebelliousness- reckless behavior  · Withdrawal from people/activities they love  · Confusion- inability to concentrate     If you or a loved one observes any of these behaviors or has concerns about self-harm, here's what you can do:  · Talk about it- your feelings and reasons for harming yourself  · Remove any means that you might use to hurt yourself (examples: pills, rope, extension cords, firearm)  · Get professional help from the community (Mental Health, Substance Abuse,  "psychological counseling)  · Do not be alone:Call your Safe Contact- someone whom you trust who will be there for you.  · Call your local CRISIS HOTLINE 823-4737 or 311-774-2603  · Call your local Children's Mobile Crisis Response Team [place] (483) 678-4847 or www.[employer]  · Call the toll free National Suicide Prevention Hotlines   · National Suicide Prevention Lifeline 807-197-TYGR (9007)  · [employer] Line Network 800-SUICIDE (028-8064)        Cholecystitis    Cholecystitis is irritation and swelling (inflammation) of the gallbladder. The gallbladder is an organ that is shaped like a pear. It is under the liver on the right side of the body. This organ stores bile. Bile helps the body break down (digest) the fats in food.  This condition can occur all of a sudden. It needs to be treated.  What are the causes?  This condition may be caused by stones or lumps that form in the gallbladder (gallstones). Gallstones can block the tube (duct) that carries bile out of your gallbladder.  Other causes are:  · Damage to the gallbladder due to less blood flow.  · Germs in the bile ducts.  · Scars or kinks in the bile ducts.  · Abnormal growths (tumors) in the liver, pancreas, or gallbladder.  What increases the risk?  You are more likely to develop this condition if:  · You have sickle cell disease.  · You take birth control pills.   · You use estrogen.  · You have alcoholic liver disease.  · You have liver cirrhosis.  · You are being fed through a vein.  · You are very ill.  · You do not eat or drink for a long time. This is also called \"fasting.\"  · You are overweight (obese).  · You lose weight too fast.  · You are pregnant.  · You have high levels of fat in the blood (triglycerides).  · You have irritation and swelling of the pancreas (pancreatitis).  What are the signs or symptoms?  Symptoms of this condition include:  · Pain in the belly (abdomen). Pain is often in the upper right area of the " belly.  · Tenderness or bloating in the belly.  · Feeling sick to your stomach (nauseous).  · Throwing up (vomiting).  · Fever.  · Chills.  How is this diagnosed?  This condition may be diagnosed with a medical history and exam. You may also have other tests, such as:  · Imaging tests. This may include:  ? Ultrasound.  ? CT scan of the belly.  ? Nuclear scan. This is also called a HIDA scan. This scan lets your doctor see the bile as it moves in your body.  ? MRI.  · Blood tests. These are done to check:  ? Your blood count. The white blood cell count may be higher than normal.  ? How well your liver works.  How is this treated?  This condition may be treated with:  · Surgery to take out your gallbladder.  · Antibiotic medicines to treat illnesses caused by germs.  · Going without food for some time.  · Giving fluids through an IV tube.  · Medicines to treat pain or throwing up.  Follow these instructions at home:  · If you had surgery, follow instructions from your doctor about how to care for yourself after you go home.  Medicines    · Take over-the-counter and prescription medicines only as told by your doctor.  · If you were prescribed an antibiotic medicine, take it as told by your doctor. Do not stop taking it even if you start to feel better.  General instructions  · Follow instructions from your doctor about what to eat or drink. Do not eat or drink anything that makes you sick again.  · Do not lift anything that is heavier than 10 lb (4.5 kg) until your doctor says that it is safe.  · Do not use any products that contain nicotine or tobacco, such as cigarettes and e-cigarettes. If you need help quitting, ask your doctor.  · Keep all follow-up visits as told by your doctor. This is important.  Contact a doctor if:  · You have pain and your medicine does not help.  · You have a fever.  Get help right away if:  · Your pain moves to:  ? Another part of your belly.  ? Your back.  · Your symptoms do not go  away.  · You have new symptoms.  Summary  · Cholecystitis is swelling and irritation of the gallbladder.  · This condition may be caused by stones or lumps that form in the gallbladder (gallstones).  · Common symptoms are pain in the belly. You may feel sick to your stomach and start throwing up. You may also have a fever and chills.  · This condition may be treated with surgery to take out the gallbladder. It may also be treated with medicines, fasting, and fluids through an IV tube.  · Follow what you are told about eating and drinking. Do not eat things that make you sick again.  This information is not intended to replace advice given to you by your health care provider. Make sure you discuss any questions you have with your health care provider.  Document Released: 12/06/2012 Document Revised: 04/26/2019 Document Reviewed: 04/26/2019  [employer] Patient Education © 2020 [employer]      Endoscopic Retrograde Cholangiopancreatogram, Care After  This sheet gives you information about how to care for yourself after your procedure. Your health care provider may also give you more specific instructions. If you have problems or questions, contact your health care provider.  What can I expect after the procedure?  After the procedure, it is common to have:  · Soreness in your throat.  · Nausea.  · Bloating.  · Dizziness.  · Tiredness (fatigue).  Follow these instructions at home:    · Take over-the-counter and prescription medicines only as told by your health care provider.  · Do not drive for 24 hours if you were given a medicine to help you relax (sedative) during your procedure. Have someone stay with you for 24 hours after the procedure.  · Return to your normal activities as told by your health care provider. Ask your health care provider what activities are safe for you.  · Return to eating what you normally do as soon as you feel well enough or as told by your health care provider.  · Keep all follow-up visits  as told by your health care provider. This is important.  Contact a health care provider if:  · You have pain in your abdomen that does not get better with medicine.  · You develop signs of infection, such as:  ? Chills.  ? Feeling unwell.  Get help right away if:  · You have difficulty swallowing.  · You have worsening pain in your throat, chest, or abdomen.  · You vomit bright red blood or a substance that looks like coffee grounds.  · You have bloody or very black stools.  · You have a fever.  · You have a sudden increase in swelling (bloating) in your abdomen.  Summary  · After the procedure, it is common to feel tired and to have some discomfort in your throat.  · Contact your health care provider if you have signs of infection--such as chills or feeling unwell--or if you have pain that does not improve with medicine.  · Get help right away if you have trouble swallowing, worsening pain, bloody or black vomit, bloody or black stools, a fever, or increased swelling in your abdomen.  · Keep all follow-up visits as told by your health care provider. This is important.  This information is not intended to replace advice given to you by your health care provider. Make sure you discuss any questions you have with your health care provider.  Document Released: 10/08/2014 Document Revised: 11/30/2018 Document Reviewed: 11/06/2017  [employer] Patient Education © 2020 [employer]    ACTIVITY: Rest and take it easy for the first 24 hours.  A responsible adult is recommended to remain with you during that time.  It is normal to feel sleepy.  We encourage you to not do anything that requires balance, judgment or coordination.    MILD FLU-LIKE SYMPTOMS ARE NORMAL. YOU MAY EXPERIENCE GENERALIZED MUSCLE ACHES, THROAT IRRITATION, HEADACHE AND/OR SOME NAUSEA.    FOR 24 HOURS DO NOT:  Drive, operate machinery or run household appliances.  Drink beer or alcoholic beverages.   Make important decisions or sign legal  documents.      DIET: To avoid nausea, slowly advance diet as tolerated, avoiding spicy or greasy foods for the first day.  Add more substantial food to your diet according to your physician's instructions.  Babies can be fed formula or breast milk as soon as they are hungry.  INCREASE FLUIDS AND FIBER TO AVOID CONSTIPATION.    SURGICAL DRESSING/BATHING: OK to shower tomorrow 10/27.  -  FOLLOW-UP APPOINTMENT:  A follow-up appointment should be arranged with your doctor; call to schedule.    You should CALL YOUR PHYSICIAN if you develop:  Fever greater than 101 degrees F.  Pain not relieved by medication, or persistent nausea or vomiting.  Excessive bleeding (blood soaking through dressing) or unexpected drainage from the wound.  Extreme redness or swelling around the incision site, drainage of pus or foul smelling drainage.  Inability to urinate or empty your bladder within 8 hours.  Problems with breathing or chest pain.    You should call 911 if you develop problems with breathing or chest pain.[phone]  --If you are unable to contact your doctor or surgical center, you should go to the nearest emergency room or urgent care center.  Physician's telephone #: [phone]    If any questions arise, call your doctor.  If your doctor is not available, please feel free to call the Surgical Center at [phone]. The Contact Center is open Monday through Friday 7AM to 5PM and may speak to a nurse at [phone], or toll free at [phone].     A registered nurse may call you a few days after your surgery to see how you are doing after your procedure.    MEDICATIONS: Resume taking daily medication.  Take prescribed pain medication with food.  If no medication is prescribed, you may take non-aspirin pain medication if needed.  PAIN MEDICATION CAN BE VERY CONSTIPATING.  Take a stool softener or laxative such as senokot, pericolace, or milk of magnesia if needed.    Prescription given for NA.  Last pain medication given  at _____.    If your physician has prescribed pain medication that includes Acetaminophen (Tylenol), do not take additional Acetaminophen (Tylenol) while taking the prescribed medication.    Depression / Suicide Risk    As you are discharged from this [facility] facility, it is important to learn how to keep safe from harming yourself.    Recognize the warning signs:  · Abrupt changes in personality, positive or negative- including increase in energy   · Giving away possessions  · Change in eating patterns- significant weight changes-  positive or negative  · Change in sleeping patterns- unable to sleep or sleeping all the time   · Unwillingness or inability to communicate  · Depression  · Unusual sadness, discouragement and loneliness  · Talk of wanting to die  · Neglect of personal appearance   · Rebelliousness- reckless behavior  · Withdrawal from people/activities they love  · Confusion- inability to concentrate     If you or a loved one observes any of these behaviors or has concerns about self-harm, here's what you can do:  · Talk about it- your feelings and reasons for harming yourself  · Remove any means that you might use to hurt yourself (examples: pills, rope, extension cords, firearm)  · Get professional help from the community (Mental Health, Substance Abuse, psychological counseling)  · Do not be alone:Call your Safe Contact- someone whom you trust who will be there for you.  · Call your local CRISIS HOTLINE 755-1938 or 379-250-9506  · Call your local Children's Mobile Crisis Response Team [place] (609) 164-9906 or www.[employer]  · Call the toll free National Suicide Prevention Hotlines   · National Suicide Prevention Lifeline 840-715-UBAT (7386)  · National Hope Line Network 800-SUICIDE (690-4177)

## 2021-11-03 NOTE — CARE PLAN
The patient is Stable - Low risk of patient condition declining or worsening    Shift Goals  Clinical Goals: pain control and decreased drain output   Patient Goals: pain control  Family Goals: No family present    Progress made toward(s) clinical / shift goals:  Pt has slightly decreased drain output as previous shifts. Pt has improved pain management without IV morphine overnight       Problem: Knowledge Deficit - Standard  Goal: Patient and family/care givers will demonstrate understanding of plan of care, disease process/condition, diagnostic tests and medications  Outcome: Progressing     Problem: Pain - Standard  Goal: Alleviation of pain or a reduction in pain to the patient’s comfort goal  Outcome: Progressing     Problem: Fall Risk  Goal: Patient will remain free from falls  Outcome: Progressing       Patient is not progressing towards the following goals:

## 2021-11-03 NOTE — DISCHARGE PLANNING
Meds-to-Beds: Discharge prescription orders listed below delivered to patient's bedside. RN Aracely notified. Patient counseled. Patient elected to have co-payment billed to patient account.     Current Outpatient Medications   Medication Sig Dispense Refill   • oxyCODONE immediate-release (ROXICODONE) 5 MG Tab Take 1 Tablet by mouth every 3 hours as needed for up to 3 days. 10 Tablet 0   • [START ON 11/4/2021] spironolactone (ALDACTONE) 25 MG Tab Take 1 Tablet by mouth every day. 30 Tablet 1   • furosemide (LASIX) 40 MG Tab Take 1 Tablet by mouth every day. 30 Tablet 1      Bernice Devries, PharmD

## 2021-11-07 ENCOUNTER — APPOINTMENT (OUTPATIENT)
Dept: RADIOLOGY | Facility: MEDICAL CENTER | Age: 66
End: 2021-11-07
Attending: EMERGENCY MEDICINE
Payer: MEDICARE

## 2021-11-07 ENCOUNTER — HOSPITAL ENCOUNTER (EMERGENCY)
Facility: MEDICAL CENTER | Age: 66
End: 2021-11-07
Attending: EMERGENCY MEDICINE
Payer: MEDICARE

## 2021-11-07 VITALS
HEART RATE: 63 BPM | WEIGHT: 138.89 LBS | DIASTOLIC BLOOD PRESSURE: 56 MMHG | BODY MASS INDEX: 22.32 KG/M2 | OXYGEN SATURATION: 97 % | SYSTOLIC BLOOD PRESSURE: 107 MMHG | RESPIRATION RATE: 18 BRPM | HEIGHT: 66 IN | TEMPERATURE: 96.8 F

## 2021-11-07 DIAGNOSIS — R10.11 RIGHT UPPER QUADRANT PAIN: ICD-10-CM

## 2021-11-07 LAB
ALBUMIN SERPL BCP-MCNC: 2.4 G/DL (ref 3.2–4.9)
ALBUMIN/GLOB SERPL: 0.6 G/DL
ALP SERPL-CCNC: 2578 U/L (ref 30–99)
ALT SERPL-CCNC: 101 U/L (ref 2–50)
ANION GAP SERPL CALC-SCNC: 9 MMOL/L (ref 7–16)
AST SERPL-CCNC: 190 U/L (ref 12–45)
BASOPHILS # BLD AUTO: 1 % (ref 0–1.8)
BASOPHILS # BLD: 0.14 K/UL (ref 0–0.12)
BILIRUB SERPL-MCNC: 7.6 MG/DL (ref 0.1–1.5)
BUN SERPL-MCNC: 15 MG/DL (ref 8–22)
CALCIUM SERPL-MCNC: 8.4 MG/DL (ref 8.4–10.2)
CHLORIDE SERPL-SCNC: 94 MMOL/L (ref 96–112)
CO2 SERPL-SCNC: 29 MMOL/L (ref 20–33)
CREAT SERPL-MCNC: 1.08 MG/DL (ref 0.5–1.4)
EOSINOPHIL # BLD AUTO: 0.29 K/UL (ref 0–0.51)
EOSINOPHIL NFR BLD: 2.1 % (ref 0–6.9)
ERYTHROCYTE [DISTWIDTH] IN BLOOD BY AUTOMATED COUNT: 61.8 FL (ref 35.9–50)
GLOBULIN SER CALC-MCNC: 4.1 G/DL (ref 1.9–3.5)
GLUCOSE SERPL-MCNC: 97 MG/DL (ref 65–99)
HCT VFR BLD AUTO: 28.1 % (ref 37–47)
HGB BLD-MCNC: 8.7 G/DL (ref 12–16)
IMM GRANULOCYTES # BLD AUTO: 0.09 K/UL (ref 0–0.11)
IMM GRANULOCYTES NFR BLD AUTO: 0.7 % (ref 0–0.9)
LIPASE SERPL-CCNC: 35 U/L (ref 7–58)
LYMPHOCYTES # BLD AUTO: 1.12 K/UL (ref 1–4.8)
LYMPHOCYTES NFR BLD: 8.2 % (ref 22–41)
MCH RBC QN AUTO: 32.5 PG (ref 27–33)
MCHC RBC AUTO-ENTMCNC: 31 G/DL (ref 33.6–35)
MCV RBC AUTO: 104.9 FL (ref 81.4–97.8)
MONOCYTES # BLD AUTO: 1.3 K/UL (ref 0–0.85)
MONOCYTES NFR BLD AUTO: 9.6 % (ref 0–13.4)
NEUTROPHILS # BLD AUTO: 10.67 K/UL (ref 2–7.15)
NEUTROPHILS NFR BLD: 78.4 % (ref 44–72)
NRBC # BLD AUTO: 0 K/UL
NRBC BLD-RTO: 0 /100 WBC
PLATELET # BLD AUTO: 480 K/UL (ref 164–446)
PMV BLD AUTO: 10.4 FL (ref 9–12.9)
POTASSIUM SERPL-SCNC: 3.7 MMOL/L (ref 3.6–5.5)
PROT SERPL-MCNC: 6.5 G/DL (ref 6–8.2)
RBC # BLD AUTO: 2.68 M/UL (ref 4.2–5.4)
SODIUM SERPL-SCNC: 132 MMOL/L (ref 135–145)
WBC # BLD AUTO: 13.6 K/UL (ref 4.8–10.8)

## 2021-11-07 PROCEDURE — 99284 EMERGENCY DEPT VISIT MOD MDM: CPT

## 2021-11-07 PROCEDURE — 74177 CT ABD & PELVIS W/CONTRAST: CPT | Mod: ME

## 2021-11-07 PROCEDURE — 85025 COMPLETE CBC W/AUTO DIFF WBC: CPT

## 2021-11-07 PROCEDURE — 700111 HCHG RX REV CODE 636 W/ 250 OVERRIDE (IP): Performed by: EMERGENCY MEDICINE

## 2021-11-07 PROCEDURE — 700117 HCHG RX CONTRAST REV CODE 255: Performed by: EMERGENCY MEDICINE

## 2021-11-07 PROCEDURE — 83690 ASSAY OF LIPASE: CPT

## 2021-11-07 PROCEDURE — 36415 COLL VENOUS BLD VENIPUNCTURE: CPT

## 2021-11-07 PROCEDURE — 80053 COMPREHEN METABOLIC PANEL: CPT

## 2021-11-07 PROCEDURE — 96375 TX/PRO/DX INJ NEW DRUG ADDON: CPT

## 2021-11-07 PROCEDURE — 700101 HCHG RX REV CODE 250: Performed by: EMERGENCY MEDICINE

## 2021-11-07 PROCEDURE — 96374 THER/PROPH/DIAG INJ IV PUSH: CPT | Mod: XU

## 2021-11-07 RX ORDER — MORPHINE SULFATE 4 MG/ML
4 INJECTION, SOLUTION INTRAMUSCULAR; INTRAVENOUS ONCE
Status: COMPLETED | OUTPATIENT
Start: 2021-11-07 | End: 2021-11-07

## 2021-11-07 RX ORDER — ONDANSETRON 2 MG/ML
4 INJECTION INTRAMUSCULAR; INTRAVENOUS ONCE
Status: COMPLETED | OUTPATIENT
Start: 2021-11-07 | End: 2021-11-07

## 2021-11-07 RX ORDER — KETOROLAC TROMETHAMINE 30 MG/ML
15 INJECTION, SOLUTION INTRAMUSCULAR; INTRAVENOUS ONCE
Status: COMPLETED | OUTPATIENT
Start: 2021-11-07 | End: 2021-11-07

## 2021-11-07 RX ORDER — BUPIVACAINE HYDROCHLORIDE AND EPINEPHRINE 5; 5 MG/ML; UG/ML
5 INJECTION, SOLUTION EPIDURAL; INTRACAUDAL; PERINEURAL ONCE
Status: COMPLETED | OUTPATIENT
Start: 2021-11-07 | End: 2021-11-07

## 2021-11-07 RX ADMIN — MORPHINE SULFATE 4 MG: 4 INJECTION INTRAVENOUS at 11:18

## 2021-11-07 RX ADMIN — IOHEXOL 100 ML: 350 INJECTION, SOLUTION INTRAVENOUS at 11:59

## 2021-11-07 RX ADMIN — BUPIVACAINE HYDROCHLORIDE AND EPINEPHRINE BITARTRATE 5 ML: 5; .005 INJECTION, SOLUTION EPIDURAL; INTRACAUDAL; PERINEURAL at 13:45

## 2021-11-07 RX ADMIN — KETOROLAC TROMETHAMINE 15 MG: 30 INJECTION, SOLUTION INTRAMUSCULAR at 11:17

## 2021-11-07 RX ADMIN — ONDANSETRON 4 MG: 2 INJECTION INTRAMUSCULAR; INTRAVENOUS at 11:18

## 2021-11-07 ASSESSMENT — LIFESTYLE VARIABLES
DO YOU DRINK ALCOHOL: YES
HAVE YOU EVER FELT YOU SHOULD CUT DOWN ON YOUR DRINKING: NO

## 2021-11-07 ASSESSMENT — FIBROSIS 4 INDEX: FIB4 SCORE: 1.96

## 2021-11-07 NOTE — DISCHARGE INSTRUCTIONS
Please continue follow-up with your specialist, and with the pain management specialist.  Return for any change or worsening symptoms.

## 2021-11-07 NOTE — ED NOTES
Med rec updated and complete  Allergies reviewed  Pt was discharged on 11/2/2021  Pt reports that she is not taking BENADRYL 25MG.  Pt reports no antibiotics in the last 30 days.    No current facility-administered medications on file prior to encounter.     Current Outpatient Medications on File Prior to Encounter   Medication Sig Dispense Refill   • NARCAN 4 MG/0.1ML Liquid Administer 1 Spray into affected nostril(S) as needed. Indications: Opioid Overdose     • spironolactone (ALDACTONE) 25 MG Tab Take 1 Tablet by mouth every day. 30 Tablet 1   • furosemide (LASIX) 40 MG Tab Take 1 Tablet by mouth every day. 30 Tablet 1   • ursodiol (ACTIGALL) 300 MG Cap Take 1 Capsule by mouth every day. 30 Capsule 0   • Ascorbic Acid (VITAMIN C PO) Take 1 Tablet by mouth every morning.     • vitamin D3 (QC VITAMIN D3) 5000 Unit (125 mcg) Tab Take 5,000 Units by mouth 2 times a day.     • pyridoxine (VITAMIN B-6) 50 MG Tab Take 50 mg by mouth every morning.     • insulin aspart (NOVOLOG FLEXPEN) 100 UNIT/ML injection PEN Inject 1-5 Units under the skin in the morning, at noon, and at bedtime. 1 units for every 5 g of carbs   AND  Sliding Scale   150 - 200 = 1 units  201 - 250 = 2 units  251 - 300 = 3 units  301 - 350 = 4 units  351 - 400 = 5 units     • levothyroxine (SYNTHROID) 25 MCG Tab Take 25 mcg by mouth every morning on an empty stomach. 25mcg tablet + 200mcg tablet for a total dose of 225mcg     • B Complex Vitamins (VITAMIN B COMPLEX PO) Take 1 Tablet by mouth every morning.     • Docusate Calcium (STOOL SOFTENER PO) Take 1 Tablet by mouth every 48 hours.     • traZODone (DESYREL) 100 MG Tab Take 100 mg by mouth at bedtime.     • pantoprazole (PROTONIX) 40 MG Tablet Delayed Response Take 40 mg by mouth every morning.     • ondansetron (ZOFRAN ODT) 4 MG TABLET DISPERSIBLE Take 1 Tablet by mouth every 6 hours as needed for Nausea. 10 Tablet 0   • TRESIBA FLEXTOUCH 100 UNIT/ML Solution Pen-injector Inject 24 Units under  the skin every evening.     • ALPRAZolam (XANAX) 0.5 MG Tab Take 0.5 mg by mouth 3 times a day as needed for Anxiety.     • sertraline (ZOLOFT) 100 MG Tab Take 100 mg by mouth every evening.     • SYNTHROID 200 MCG Tab Take 200 mcg by mouth every morning. 200mcg tablet + 25mcg tablet for a total dose of 225mcg     • metoprolol (TOPROL-XL) 200 MG XL tablet Take 1 tablet by mouth every day. 90 tablet 3   • Continuous Blood Gluc Sensor (FREESTYLE PARISA 14 DAY SENSOR) Misc 1 MISCELLANEOUS E10.42 CHANGE SENSOR EVERY 14 DAYS   E11.65     • aspirin 81 MG EC tablet Take 1 Tab by mouth every morning. 90 Tab 3

## 2021-11-07 NOTE — ED PROVIDER NOTES
ED Provider  Scribed for Jong Burrell D.O. by Alicia Sevilla. 11/7/2021  10:43 AM    Means of arrival: Walk in  History obtained from: Patient  History limited by: None    CHIEF COMPLAINT  Chief Complaint   Patient presents with    Abdominal Pain    Nausea       HPI  Anu Manuel is a 66 y.o. female, with a history of idiopathic hepatitis, and diabetes, who presents to the ED for ongoing right upper quadrant abdominal pain with an onset of yesterday. She describes her pain as cramping in quality with some intermittent radiation to her right lower quadrant. Patient underwent a ERCP on 10/24 with eventual cholecystectomy due to gallstones. She had her drainage tube taken out 5 days ago. She reports associated nausea x1 day, and jaundice x4 weeks (chronic). She denies any associated vomiting, fever, or chills. No alleviating or exacerbating factors were identified.     REVIEW OF SYSTEMS  See HPI for further details. All other systems are negative.     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    SOCIAL HISTORY  Social History     Tobacco Use    Smoking status: Current Every Day Smoker     Packs/day: 0.50     Years: 30.00     Pack years: 15.00     Types: Cigarettes    Smokeless tobacco: Never Used   Vaping Use    Vaping Use: Never used   Substance and Sexual Activity    Alcohol use: Not Currently    Drug use: Yes     Comment: Marijuana       SURGICAL  HISTORY   has a past surgical history that includes hysterectomy, vaginal (2006); thyroid lobectomy (1973); lumpectomy (1976, 2005); cervical disk and fusion anterior (03/12/08); tonsillectomy (1963); cervical fusion posterior (1/16/2009); hardware removal neuro (1/16/2009); neck exploration (1/16/2009); abdominal hysterectomy total; lumpectomy; lumbar laminectomy diskectomy (Right, 5/10/2016); shoulder decompression arthroscopic (6/17/08); clavicle distal excision (6/17/08); shoulder arthroscopy w/ rotator cuff repair (10/9/08); njx aa&/strd tfrml epi lumbar/sacral 1 level (Right, 8/31/2016); spinal cord stimulator (N/A, 10/26/2018); thoracic laminectomy (N/A, 10/26/2018); appendectomy (2004); implant neurostim/ (N/A, 12/16/2019); irrigation & debridement neuro (1/19/2020); cath placement (1/25/2020); ercp,diagnostic (N/A, 10/26/2021); upper gi endoscopy,biopsy (N/A, 10/26/2021); upper gi endoscopy,diagnosis (N/A, 10/26/2021); and peter by laparoscopy (N/A, 10/28/2021).    CURRENT MEDICATIONS  Home Medications       Reviewed by Stone Brady (Pharmacy Tech) on 11/07/21 at 1255  Med List Status: Complete     Medication Last Dose Status   ALPRAZolam (XANAX) 0.5 MG Tab > 3 weeks Active   Ascorbic Acid (VITAMIN C PO) 11/7/2021 Active   aspirin 81 MG EC tablet 11/7/2021 Active   B Complex Vitamins (VITAMIN B COMPLEX PO) 11/7/2021 Active   Continuous Blood Gluc Sensor (FREESTYLE PARISA 14 DAY SENSOR) Misc Unknown Active   Docusate Calcium (STOOL SOFTENER PO) 11/6/2021 Active   furosemide (LASIX) 40 MG Tab 11/7/2021 Active   insulin aspart (NOVOLOG FLEXPEN) 100 UNIT/ML injection PEN 11/7/2021 Active   levothyroxine (SYNTHROID) 25 MCG Tab 11/7/2021 Active   metoprolol (TOPROL-XL) 200 MG XL tablet 11/7/2021 Active   NARCAN 4 MG/0.1ML Liquid >Never used Active   ondansetron (ZOFRAN ODT) 4 MG TABLET DISPERSIBLE 11/6/2021 Active   pantoprazole (PROTONIX) 40 MG Tablet Delayed Response 11/7/2021 Active  "  pyridoxine (VITAMIN B-6) 50 MG Tab 11/7/2021 Active   sertraline (ZOLOFT) 100 MG Tab 11/6/2021 Active   spironolactone (ALDACTONE) 25 MG Tab 11/7/2021 Active   SYNTHROID 200 MCG Tab 11/7/2021 Active   traZODone (DESYREL) 100 MG Tab 11/6/2021 Active   TRESIBA FLEXTOUCH 100 UNIT/ML Solution Pen-injector 11/6/2021 Active   ursodiol (ACTIGALL) 300 MG Cap 11/7/2021 Active   vitamin D3 (QC VITAMIN D3) 5000 Unit (125 mcg) Tab 11/7/2021 Active                  Current medications can be seen on the nurse's note.      ALLERGIES  Allergies   Allergen Reactions    Tape Unspecified     Blisters, paper tape is ok       PHYSICAL EXAM  VITAL SIGNS: /87   Pulse 74   Temp 36.1 °C (97 °F) (Temporal)   Resp 18   Ht 1.676 m (5' 6\")   Wt 63 kg (138 lb 14.2 oz)   LMP 04/29/2005 (LMP Unknown)   SpO2 99%   BMI 22.42 kg/m²   Constitutional: Alert in mild distress.  HENT: No signs of trauma, mucous membranes are moist  Eyes: Conjunctiva normal, scleral jaundice  Neck: Normal range of motion, No tenderness, Supple.  Lymphatic: No lymphadenopathy noted.   Cardiovascular: Regular rate and rhythm, no murmurs.   Thorax & Lungs: Normal breath sounds, No respiratory distress, No wheezing, No chest tenderness.   Abdomen: Mild diffuse tenderness. Bowel sounds normal, Soft, No masses, No pulsatile masses. No peritoneal signs.  Skin: Warm, Dry, Jaundiced.   Extremities: No edema, No tenderness, No cyanosis  Musculoskeletal: Good range of motion in all major joints. No major deformities noted.   Neurologic: Alert and oriented x4, Normal motor function, Normal sensory function, No focal deficits noted.   Psychiatric: Affect normal, Judgment normal, Mood normal.     DIAGNOSTIC STUDIES / PROCEDURES    LABS  Results for orders placed or performed during the hospital encounter of 11/07/21   CBC WITH DIFFERENTIAL   Result Value Ref Range    WBC 13.6 (H) 4.8 - 10.8 K/uL    RBC 2.68 (L) 4.20 - 5.40 M/uL    Hemoglobin 8.7 (L) 12.0 - 16.0 g/dL    " Hematocrit 28.1 (L) 37.0 - 47.0 %    .9 (H) 81.4 - 97.8 fL    MCH 32.5 27.0 - 33.0 pg    MCHC 31.0 (L) 33.6 - 35.0 g/dL    RDW 61.8 (H) 35.9 - 50.0 fL    Platelet Count 480 (H) 164 - 446 K/uL    MPV 10.4 9.0 - 12.9 fL    Neutrophils-Polys 78.40 (H) 44.00 - 72.00 %    Lymphocytes 8.20 (L) 22.00 - 41.00 %    Monocytes 9.60 0.00 - 13.40 %    Eosinophils 2.10 0.00 - 6.90 %    Basophils 1.00 0.00 - 1.80 %    Immature Granulocytes 0.70 0.00 - 0.90 %    Nucleated RBC 0.00 /100 WBC    Neutrophils (Absolute) 10.67 (H) 2.00 - 7.15 K/uL    Lymphs (Absolute) 1.12 1.00 - 4.80 K/uL    Monos (Absolute) 1.30 (H) 0.00 - 0.85 K/uL    Eos (Absolute) 0.29 0.00 - 0.51 K/uL    Baso (Absolute) 0.14 (H) 0.00 - 0.12 K/uL    Immature Granulocytes (abs) 0.09 0.00 - 0.11 K/uL    NRBC (Absolute) 0.00 K/uL   COMP METABOLIC PANEL   Result Value Ref Range    Sodium 132 (L) 135 - 145 mmol/L    Potassium 3.7 3.6 - 5.5 mmol/L    Chloride 94 (L) 96 - 112 mmol/L    Co2 29 20 - 33 mmol/L    Anion Gap 9.0 7.0 - 16.0    Glucose 97 65 - 99 mg/dL    Bun 15 8 - 22 mg/dL    Creatinine 1.08 0.50 - 1.40 mg/dL    Calcium 8.4 8.4 - 10.2 mg/dL    AST(SGOT) 190 (H) 12 - 45 U/L    ALT(SGPT) 101 (H) 2 - 50 U/L    Alkaline Phosphatase 2578 (H) 30 - 99 U/L    Total Bilirubin 7.6 (H) 0.1 - 1.5 mg/dL    Albumin 2.4 (L) 3.2 - 4.9 g/dL    Total Protein 6.5 6.0 - 8.2 g/dL    Globulin 4.1 (H) 1.9 - 3.5 g/dL    A-G Ratio 0.6 g/dL   LIPASE   Result Value Ref Range    Lipase 35 7 - 58 U/L   ESTIMATED GFR   Result Value Ref Range    GFR If African American >60 >60 mL/min/1.73 m 2    GFR If Non  51 (A) >60 mL/min/1.73 m 2     All labs reviewed by me.    RADIOLOGY  CT-ABDOMEN-PELVIS WITH   Final Result      Interval cholecystectomy with gas at the gallbladder fossa which is atypical 2 weeks postprocedure and worrisome for infection but no abscess is detected      New small perihepatic and left anterior pararenal space small ascites without abscess seen. This  could be secondary to pancreatitis. Biliary leak is possible but no fluid collecting in the right paracolic gutter is seen to support this conclusion. Common    bile duct stent is present. Consider HIDA scan to evaluate for leak.      Anasarca      Hysterectomy and appendectomy. Resolved large volume colonic stool        The radiologist's interpretations of all radiological studies have been reviewed by me.    Films have been independently by me      COURSE  Pertinent Labs & Imaging studies reviewed. (See chart for details)    10:43 AM - Patient seen and examined at bedside. Discussed plan of care. I informed her we will obtain lab studies to evaluate her symptoms. She is understanding and agreeable to plan. Ordered for Lipase, CMP, CBC with diff, Estimated GFR, and CT Abdomen Pelvis to evaluate her symptoms. She will be treated with Zofran 4 mg, Morphine 4 mg, and Toradol 15 mg injection for pain management.    12:52 PM - Paged surgery.     1:02 PM - I discussed the patient's case and the above findings with Dr. Chavis (surgery) who agreed to consult on the patient.     1:16 PM - Patient will be treated with bupivacaine with epi 0.5% 5 ml injection for her symptoms.    2:58 PM - Patient was reevaluated at bedside. She is resting in bed feeling mildly improved. Discussed lab and radiology results with the patient as outlined above. She will follow up with her PCP. The patient will return for new or worsening symptoms and is stable at the time of discharge. Patient verbalizes understanding and agreement to this plan of care.      Procedure: Local anesthesia, 3 cc Sensorcaine 0.5% with epinephrine was injected around the injection site of the right upper quadrant patient tolerated this well    MEDICAL DECISION MAKING  This is a 66 y.o. female who presents with complaints of a diffuse generalized abdominal pain.  She has hepatitis, and recent biliary stent placement with laparoscopic cholecystitis.  Her area of pain is  primarily left lower quadrant.  And she does have some tenderness around the right upper quadrant surgical site.  There is no signs of infection.  CT was done shows no obvious abscess, infection, no obvious fluid leak.  As she had tenderness around the site on the right upper quadrant this was injected and this did completely resolve any pain in the right upper quadrant.  She does stop some mild pain in the left lower quadrant which is not tender.  There is no signs of appendicitis or other inflammatory or obstructive processes.  Clinically this does not appear to be problems from postsurgical.  She has been having his chronic pain which is inside of the evaluation that found symptoms and problems that she has.    She is stable for discharge home and to follow-up with her specialist.  She has a referral to pain management.      DISPOSITION:  Patient will be discharged home in stable condition.    FOLLOW UP:  Jarrell Yates M.D.  [address] [facility]  [address]  [facility] [address]  [phone]    In 3 days      FINAL IMPRESSION  1. Right upper quadrant pain         Alicia MCKEON (Scribe), am scribing for, and in the presence of, Jong Burrell D.O..    Electronically signed by: Alicia Sevilla (Scribe), 11/7/2021    IJong D.O. personally performed the services described in this documentation, as scribed by Alicia Sevilla in my presence, and it is both accurate and complete.    C    The note accurately reflects work and decisions made by me.  Jong Burrell D.O.  11/7/2021  4:20 PM

## 2021-11-07 NOTE — ED TRIAGE NOTES
"Pt underwent ERCP the 24 th of last month with eventual  cholecystectomy. Describes a hx of idiopathic hepatitis, and escalating abdominal pain concerning for ongoing gallbladder pathology.  She presents today complaining of RUQ abdominal pain, with associated nausea since yesterday.  Chief Complaint   Patient presents with   • Abdominal Pain   • Nausea       /87   Pulse 74   Temp 36.1 °C (97 °F) (Temporal)   Resp 18   Ht 1.676 m (5' 6\")   Wt 63 kg (138 lb 14.2 oz)   LMP 04/29/2005 (LMP Unknown)   SpO2 99%   BMI 22.42 kg/m²   Has this patient been vaccinated for COVID YERS  If not, would they like to be vaccinated while in the ER if eligible?  N/A  Would the patient like to speak with the ERP about the possibility of receiving the COVID vaccine today before making a decision? N/A    "

## 2021-11-08 PROBLEM — F32.1 CURRENT MODERATE EPISODE OF MAJOR DEPRESSIVE DISORDER (HCC): Status: ACTIVE | Noted: 2021-10-17

## 2021-11-10 ENCOUNTER — APPOINTMENT (OUTPATIENT)
Dept: RADIOLOGY | Facility: MEDICAL CENTER | Age: 66
End: 2021-11-10
Attending: EMERGENCY MEDICINE
Payer: MEDICARE

## 2021-11-10 ENCOUNTER — HOSPITAL ENCOUNTER (OUTPATIENT)
Facility: MEDICAL CENTER | Age: 66
End: 2021-11-11
Attending: EMERGENCY MEDICINE | Admitting: STUDENT IN AN ORGANIZED HEALTH CARE EDUCATION/TRAINING PROGRAM
Payer: MEDICARE

## 2021-11-10 ENCOUNTER — HOSPITAL ENCOUNTER (EMERGENCY)
Facility: MEDICAL CENTER | Age: 66
End: 2021-11-10
Attending: EMERGENCY MEDICINE
Payer: MEDICARE

## 2021-11-10 ENCOUNTER — PATIENT OUTREACH (OUTPATIENT)
Dept: HEALTH INFORMATION MANAGEMENT | Facility: OTHER | Age: 66
End: 2021-11-10

## 2021-11-10 VITALS
HEART RATE: 67 BPM | DIASTOLIC BLOOD PRESSURE: 69 MMHG | WEIGHT: 138 LBS | RESPIRATION RATE: 12 BRPM | BODY MASS INDEX: 22.18 KG/M2 | OXYGEN SATURATION: 100 % | TEMPERATURE: 98.3 F | SYSTOLIC BLOOD PRESSURE: 137 MMHG | HEIGHT: 66 IN

## 2021-11-10 DIAGNOSIS — R10.11 CHRONIC RUQ PAIN: ICD-10-CM

## 2021-11-10 DIAGNOSIS — K76.9 LIVER DISEASE: ICD-10-CM

## 2021-11-10 DIAGNOSIS — R10.11 RIGHT UPPER QUADRANT PAIN: ICD-10-CM

## 2021-11-10 DIAGNOSIS — G89.29 CHRONIC RUQ PAIN: ICD-10-CM

## 2021-11-10 DIAGNOSIS — R10.11 RIGHT UPPER QUADRANT ABDOMINAL PAIN: ICD-10-CM

## 2021-11-10 DIAGNOSIS — R74.8 ELEVATED LIVER ENZYMES: ICD-10-CM

## 2021-11-10 DIAGNOSIS — T88.8XXA FLUID COLLECTION AT SURGICAL SITE, INITIAL ENCOUNTER: ICD-10-CM

## 2021-11-10 LAB
ALBUMIN SERPL BCP-MCNC: 2.4 G/DL (ref 3.2–4.9)
ALBUMIN/GLOB SERPL: 0.6 G/DL
ALP SERPL-CCNC: 2398 U/L (ref 30–99)
ALT SERPL-CCNC: 95 U/L (ref 2–50)
ANION GAP SERPL CALC-SCNC: 10 MMOL/L (ref 7–16)
AST SERPL-CCNC: 171 U/L (ref 12–45)
BASOPHILS # BLD AUTO: 1.5 % (ref 0–1.8)
BASOPHILS # BLD: 0.18 K/UL (ref 0–0.12)
BILIRUB SERPL-MCNC: 7.1 MG/DL (ref 0.1–1.5)
BUN SERPL-MCNC: 17 MG/DL (ref 8–22)
CALCIUM SERPL-MCNC: 8.2 MG/DL (ref 8.4–10.2)
CHLORIDE SERPL-SCNC: 94 MMOL/L (ref 96–112)
CO2 SERPL-SCNC: 27 MMOL/L (ref 20–33)
CREAT SERPL-MCNC: 0.83 MG/DL (ref 0.5–1.4)
EOSINOPHIL # BLD AUTO: 0.37 K/UL (ref 0–0.51)
EOSINOPHIL NFR BLD: 3.1 % (ref 0–6.9)
ERYTHROCYTE [DISTWIDTH] IN BLOOD BY AUTOMATED COUNT: 61 FL (ref 35.9–50)
GLOBULIN SER CALC-MCNC: 3.8 G/DL (ref 1.9–3.5)
GLUCOSE BLD-MCNC: 327 MG/DL (ref 65–99)
GLUCOSE SERPL-MCNC: 290 MG/DL (ref 65–99)
HCT VFR BLD AUTO: 26.8 % (ref 37–47)
HGB BLD-MCNC: 8.5 G/DL (ref 12–16)
IMM GRANULOCYTES # BLD AUTO: 0.05 K/UL (ref 0–0.11)
IMM GRANULOCYTES NFR BLD AUTO: 0.4 % (ref 0–0.9)
LIPASE SERPL-CCNC: 25 U/L (ref 7–58)
LYMPHOCYTES # BLD AUTO: 1.08 K/UL (ref 1–4.8)
LYMPHOCYTES NFR BLD: 9.1 % (ref 22–41)
MCH RBC QN AUTO: 32.6 PG (ref 27–33)
MCHC RBC AUTO-ENTMCNC: 31.7 G/DL (ref 33.6–35)
MCV RBC AUTO: 102.7 FL (ref 81.4–97.8)
MONOCYTES # BLD AUTO: 1.03 K/UL (ref 0–0.85)
MONOCYTES NFR BLD AUTO: 8.7 % (ref 0–13.4)
NEUTROPHILS # BLD AUTO: 9.12 K/UL (ref 2–7.15)
NEUTROPHILS NFR BLD: 77.2 % (ref 44–72)
NRBC # BLD AUTO: 0 K/UL
NRBC BLD-RTO: 0 /100 WBC
PLATELET # BLD AUTO: 461 K/UL (ref 164–446)
PMV BLD AUTO: 11.6 FL (ref 9–12.9)
POTASSIUM SERPL-SCNC: 4 MMOL/L (ref 3.6–5.5)
PROT SERPL-MCNC: 6.2 G/DL (ref 6–8.2)
RBC # BLD AUTO: 2.61 M/UL (ref 4.2–5.4)
SODIUM SERPL-SCNC: 131 MMOL/L (ref 135–145)
WBC # BLD AUTO: 11.8 K/UL (ref 4.8–10.8)

## 2021-11-10 PROCEDURE — G0378 HOSPITAL OBSERVATION PER HR: HCPCS

## 2021-11-10 PROCEDURE — 96374 THER/PROPH/DIAG INJ IV PUSH: CPT

## 2021-11-10 PROCEDURE — A9270 NON-COVERED ITEM OR SERVICE: HCPCS | Performed by: STUDENT IN AN ORGANIZED HEALTH CARE EDUCATION/TRAINING PROGRAM

## 2021-11-10 PROCEDURE — 700111 HCHG RX REV CODE 636 W/ 250 OVERRIDE (IP): Performed by: EMERGENCY MEDICINE

## 2021-11-10 PROCEDURE — 96372 THER/PROPH/DIAG INJ SC/IM: CPT

## 2021-11-10 PROCEDURE — 85025 COMPLETE CBC W/AUTO DIFF WBC: CPT

## 2021-11-10 PROCEDURE — 99024 POSTOP FOLLOW-UP VISIT: CPT | Performed by: SURGERY

## 2021-11-10 PROCEDURE — 99220 PR INITIAL OBSERVATION CARE,LEVL III: CPT | Performed by: STUDENT IN AN ORGANIZED HEALTH CARE EDUCATION/TRAINING PROGRAM

## 2021-11-10 PROCEDURE — 700111 HCHG RX REV CODE 636 W/ 250 OVERRIDE (IP): Performed by: STUDENT IN AN ORGANIZED HEALTH CARE EDUCATION/TRAINING PROGRAM

## 2021-11-10 PROCEDURE — 71045 X-RAY EXAM CHEST 1 VIEW: CPT

## 2021-11-10 PROCEDURE — 78226 HEPATOBILIARY SYSTEM IMAGING: CPT | Mod: MF

## 2021-11-10 PROCEDURE — 83690 ASSAY OF LIPASE: CPT

## 2021-11-10 PROCEDURE — 80053 COMPREHEN METABOLIC PANEL: CPT

## 2021-11-10 PROCEDURE — 74177 CT ABD & PELVIS W/CONTRAST: CPT | Mod: ME

## 2021-11-10 PROCEDURE — 96375 TX/PRO/DX INJ NEW DRUG ADDON: CPT

## 2021-11-10 PROCEDURE — 96376 TX/PRO/DX INJ SAME DRUG ADON: CPT

## 2021-11-10 PROCEDURE — 82962 GLUCOSE BLOOD TEST: CPT

## 2021-11-10 PROCEDURE — 700102 HCHG RX REV CODE 250 W/ 637 OVERRIDE(OP): Performed by: STUDENT IN AN ORGANIZED HEALTH CARE EDUCATION/TRAINING PROGRAM

## 2021-11-10 PROCEDURE — 99285 EMERGENCY DEPT VISIT HI MDM: CPT

## 2021-11-10 PROCEDURE — A9270 NON-COVERED ITEM OR SERVICE: HCPCS | Performed by: SURGERY

## 2021-11-10 PROCEDURE — 700102 HCHG RX REV CODE 250 W/ 637 OVERRIDE(OP): Performed by: SURGERY

## 2021-11-10 PROCEDURE — 700117 HCHG RX CONTRAST REV CODE 255: Performed by: EMERGENCY MEDICINE

## 2021-11-10 RX ORDER — BISACODYL 10 MG
10 SUPPOSITORY, RECTAL RECTAL
Status: DISCONTINUED | OUTPATIENT
Start: 2021-11-10 | End: 2021-11-12 | Stop reason: HOSPADM

## 2021-11-10 RX ORDER — AMOXICILLIN AND CLAVULANATE POTASSIUM 875; 125 MG/1; MG/1
1 TABLET, FILM COATED ORAL EVERY 12 HOURS
Status: DISCONTINUED | OUTPATIENT
Start: 2021-11-10 | End: 2021-11-12 | Stop reason: HOSPADM

## 2021-11-10 RX ORDER — OXYCODONE HYDROCHLORIDE 5 MG/1
5 TABLET ORAL
Status: ON HOLD | COMMUNITY
Start: 2021-11-03 | End: 2021-11-11

## 2021-11-10 RX ORDER — LEVOTHYROXINE SODIUM 0.07 MG/1
225 TABLET ORAL
Status: DISCONTINUED | OUTPATIENT
Start: 2021-11-11 | End: 2021-11-10

## 2021-11-10 RX ORDER — HYDROMORPHONE HYDROCHLORIDE 1 MG/ML
1 INJECTION, SOLUTION INTRAMUSCULAR; INTRAVENOUS; SUBCUTANEOUS
Status: DISCONTINUED | OUTPATIENT
Start: 2021-11-10 | End: 2021-11-12 | Stop reason: HOSPADM

## 2021-11-10 RX ORDER — ONDANSETRON 2 MG/ML
4 INJECTION INTRAMUSCULAR; INTRAVENOUS EVERY 4 HOURS PRN
Status: DISCONTINUED | OUTPATIENT
Start: 2021-11-10 | End: 2021-11-12 | Stop reason: HOSPADM

## 2021-11-10 RX ORDER — OXYCODONE HYDROCHLORIDE 5 MG/1
5 TABLET ORAL
Status: DISCONTINUED | OUTPATIENT
Start: 2021-11-10 | End: 2021-11-12 | Stop reason: HOSPADM

## 2021-11-10 RX ORDER — ONDANSETRON 2 MG/ML
4 INJECTION INTRAMUSCULAR; INTRAVENOUS ONCE
Status: COMPLETED | OUTPATIENT
Start: 2021-11-10 | End: 2021-11-10

## 2021-11-10 RX ORDER — ENALAPRILAT 1.25 MG/ML
1.25 INJECTION INTRAVENOUS EVERY 6 HOURS PRN
Status: DISCONTINUED | OUTPATIENT
Start: 2021-11-10 | End: 2021-11-12 | Stop reason: HOSPADM

## 2021-11-10 RX ORDER — SPIRONOLACTONE 25 MG/1
25 TABLET ORAL DAILY
Status: DISCONTINUED | OUTPATIENT
Start: 2021-11-10 | End: 2021-11-12 | Stop reason: HOSPADM

## 2021-11-10 RX ORDER — FUROSEMIDE 40 MG/1
40 TABLET ORAL DAILY
Status: DISCONTINUED | OUTPATIENT
Start: 2021-11-10 | End: 2021-11-12 | Stop reason: HOSPADM

## 2021-11-10 RX ORDER — LORAZEPAM 2 MG/ML
1 INJECTION INTRAMUSCULAR ONCE
Status: COMPLETED | OUTPATIENT
Start: 2021-11-10 | End: 2021-11-10

## 2021-11-10 RX ORDER — PROMETHAZINE HYDROCHLORIDE 25 MG/1
12.5-25 TABLET ORAL EVERY 4 HOURS PRN
Status: DISCONTINUED | OUTPATIENT
Start: 2021-11-10 | End: 2021-11-12 | Stop reason: HOSPADM

## 2021-11-10 RX ORDER — LORAZEPAM 2 MG/ML
2 INJECTION INTRAMUSCULAR
Status: DISCONTINUED | OUTPATIENT
Start: 2021-11-10 | End: 2021-11-11

## 2021-11-10 RX ORDER — KETOROLAC TROMETHAMINE 30 MG/ML
15 INJECTION, SOLUTION INTRAMUSCULAR; INTRAVENOUS ONCE
Status: COMPLETED | OUTPATIENT
Start: 2021-11-10 | End: 2021-11-10

## 2021-11-10 RX ORDER — URSODIOL 300 MG/1
300 CAPSULE ORAL DAILY
Status: DISCONTINUED | OUTPATIENT
Start: 2021-11-10 | End: 2021-11-12 | Stop reason: HOSPADM

## 2021-11-10 RX ORDER — PROMETHAZINE HYDROCHLORIDE 25 MG/1
12.5-25 SUPPOSITORY RECTAL EVERY 4 HOURS PRN
Status: DISCONTINUED | OUTPATIENT
Start: 2021-11-10 | End: 2021-11-12 | Stop reason: HOSPADM

## 2021-11-10 RX ORDER — POLYETHYLENE GLYCOL 3350 17 G/17G
1 POWDER, FOR SOLUTION ORAL
Status: DISCONTINUED | OUTPATIENT
Start: 2021-11-10 | End: 2021-11-12 | Stop reason: HOSPADM

## 2021-11-10 RX ORDER — HYDROMORPHONE HYDROCHLORIDE 1 MG/ML
1 INJECTION, SOLUTION INTRAMUSCULAR; INTRAVENOUS; SUBCUTANEOUS ONCE
Status: COMPLETED | OUTPATIENT
Start: 2021-11-10 | End: 2021-11-10

## 2021-11-10 RX ORDER — OMEPRAZOLE 20 MG/1
20 CAPSULE, DELAYED RELEASE ORAL EVERY MORNING
Status: DISCONTINUED | OUTPATIENT
Start: 2021-11-10 | End: 2021-11-12 | Stop reason: HOSPADM

## 2021-11-10 RX ORDER — LORAZEPAM 2 MG/ML
2 INJECTION INTRAMUSCULAR ONCE
Status: COMPLETED | OUTPATIENT
Start: 2021-11-10 | End: 2021-11-10

## 2021-11-10 RX ORDER — SERTRALINE HYDROCHLORIDE 100 MG/1
100 TABLET, FILM COATED ORAL EVERY EVENING
Status: DISCONTINUED | OUTPATIENT
Start: 2021-11-10 | End: 2021-11-12 | Stop reason: HOSPADM

## 2021-11-10 RX ORDER — ONDANSETRON 4 MG/1
4 TABLET, ORALLY DISINTEGRATING ORAL EVERY 4 HOURS PRN
Status: DISCONTINUED | OUTPATIENT
Start: 2021-11-10 | End: 2021-11-12 | Stop reason: HOSPADM

## 2021-11-10 RX ORDER — DEXTROSE MONOHYDRATE 25 G/50ML
50 INJECTION, SOLUTION INTRAVENOUS
Status: DISCONTINUED | OUTPATIENT
Start: 2021-11-10 | End: 2021-11-12 | Stop reason: HOSPADM

## 2021-11-10 RX ORDER — OXYCODONE HYDROCHLORIDE 10 MG/1
10 TABLET ORAL
Status: DISCONTINUED | OUTPATIENT
Start: 2021-11-10 | End: 2021-11-12 | Stop reason: HOSPADM

## 2021-11-10 RX ORDER — LABETALOL HYDROCHLORIDE 5 MG/ML
10 INJECTION, SOLUTION INTRAVENOUS EVERY 4 HOURS PRN
Status: DISCONTINUED | OUTPATIENT
Start: 2021-11-10 | End: 2021-11-12 | Stop reason: HOSPADM

## 2021-11-10 RX ORDER — TRAZODONE HYDROCHLORIDE 50 MG/1
100 TABLET ORAL
Status: DISCONTINUED | OUTPATIENT
Start: 2021-11-10 | End: 2021-11-12 | Stop reason: HOSPADM

## 2021-11-10 RX ORDER — CLONIDINE HYDROCHLORIDE 0.1 MG/1
0.1 TABLET ORAL EVERY 6 HOURS PRN
Status: DISCONTINUED | OUTPATIENT
Start: 2021-11-10 | End: 2021-11-12 | Stop reason: HOSPADM

## 2021-11-10 RX ORDER — PROCHLORPERAZINE EDISYLATE 5 MG/ML
5-10 INJECTION INTRAMUSCULAR; INTRAVENOUS EVERY 4 HOURS PRN
Status: DISCONTINUED | OUTPATIENT
Start: 2021-11-10 | End: 2021-11-12 | Stop reason: HOSPADM

## 2021-11-10 RX ADMIN — LORAZEPAM 2 MG: 2 INJECTION INTRAMUSCULAR; INTRAVENOUS at 23:03

## 2021-11-10 RX ADMIN — SPIRONOLACTONE 25 MG: 25 TABLET ORAL at 16:43

## 2021-11-10 RX ADMIN — LORAZEPAM 1 MG: 2 INJECTION INTRAMUSCULAR; INTRAVENOUS at 13:36

## 2021-11-10 RX ADMIN — LORAZEPAM 1 MG: 2 INJECTION INTRAMUSCULAR; INTRAVENOUS at 13:01

## 2021-11-10 RX ADMIN — INSULIN GLARGINE 15 UNITS: 100 INJECTION, SOLUTION SUBCUTANEOUS at 20:28

## 2021-11-10 RX ADMIN — LORAZEPAM 2 MG: 2 INJECTION INTRAMUSCULAR; INTRAVENOUS at 20:11

## 2021-11-10 RX ADMIN — FUROSEMIDE 40 MG: 40 TABLET ORAL at 16:42

## 2021-11-10 RX ADMIN — HYDROMORPHONE HYDROCHLORIDE 1 MG: 1 INJECTION, SOLUTION INTRAMUSCULAR; INTRAVENOUS; SUBCUTANEOUS at 14:03

## 2021-11-10 RX ADMIN — OMEPRAZOLE 20 MG: 20 CAPSULE, DELAYED RELEASE ORAL at 16:43

## 2021-11-10 RX ADMIN — IOHEXOL 100 ML: 350 INJECTION, SOLUTION INTRAVENOUS at 06:41

## 2021-11-10 RX ADMIN — INSULIN HUMAN 4 UNITS: 100 INJECTION, SOLUTION PARENTERAL at 20:28

## 2021-11-10 RX ADMIN — AMOXICILLIN AND CLAVULANATE POTASSIUM 1 TABLET: 875; 125 TABLET, FILM COATED ORAL at 20:11

## 2021-11-10 RX ADMIN — LEVOTHYROXINE SODIUM 225 MCG: 0.2 TABLET ORAL at 16:42

## 2021-11-10 RX ADMIN — TRAZODONE HYDROCHLORIDE 100 MG: 50 TABLET ORAL at 20:11

## 2021-11-10 RX ADMIN — FENTANYL CITRATE 100 MCG: 50 INJECTION, SOLUTION INTRAMUSCULAR; INTRAVENOUS at 06:00

## 2021-11-10 RX ADMIN — LORAZEPAM 2 MG: 2 INJECTION INTRAMUSCULAR; INTRAVENOUS at 10:11

## 2021-11-10 RX ADMIN — KETOROLAC TROMETHAMINE 15 MG: 30 INJECTION, SOLUTION INTRAMUSCULAR; INTRAVENOUS at 10:38

## 2021-11-10 RX ADMIN — ONDANSETRON 4 MG: 2 INJECTION INTRAMUSCULAR; INTRAVENOUS at 06:01

## 2021-11-10 RX ADMIN — FENTANYL CITRATE 100 MCG: 50 INJECTION, SOLUTION INTRAMUSCULAR; INTRAVENOUS at 07:26

## 2021-11-10 ASSESSMENT — ENCOUNTER SYMPTOMS
CARDIOVASCULAR NEGATIVE: 1
WEAKNESS: 1
ABDOMINAL PAIN: 1
RESPIRATORY NEGATIVE: 1
EYES NEGATIVE: 1
NAUSEA: 1
PSYCHIATRIC NEGATIVE: 1
BACK PAIN: 1
VOMITING: 1

## 2021-11-10 ASSESSMENT — PAIN DESCRIPTION - PAIN TYPE
TYPE: ACUTE PAIN
TYPE: ACUTE PAIN
TYPE: CHRONIC PAIN
TYPE: ACUTE PAIN
TYPE: ACUTE PAIN

## 2021-11-10 ASSESSMENT — FIBROSIS 4 INDEX
FIB4 SCORE: 2.52
FIB4 SCORE: 2.44
FIB4 SCORE: 2.44

## 2021-11-10 ASSESSMENT — PAIN DESCRIPTION - DESCRIPTORS: DESCRIPTORS: SHOOTING;SHARP;ACHING

## 2021-11-10 NOTE — ED PROVIDER NOTES
ED Provider Note    CHIEF COMPLAINT  Chief Complaint   Patient presents with   • Back Pain     Pt BIB [facility] for back Pn that radiates to her ribs on both sides; Pt stated that she was sleeping and was woken up by the Pn; Hx of back problems and liver failure; +Nausea, -V/D; - blood thinners;   • Rib Pain       HPI  Anu Manuel is a 66 y.o. female who presents to the Emergency Department secondary to right upper quadrant abdominal pain  She has known hepatitis with liver failure and recent biopsies are pending interpretation  from [place]. She has been hospitalized in past at [facility] for liver evaluation.    She is on Lasix and spironolactone.  She has had multiple interventions evaluations and visits for similar symptoms.  She had an ERCP on October 24, followed by a cholecystectomy due to concern for chronic cholecystitis contributing to her discomfort withdrainage tube placed until November 2 for postoperative fluid collection..  Her last visit was 3 days ago.  During her last t visit her WBC 13.6 with a hemoglobin of 8.7, ,  and Alk phos 2578, Bili 7.6, CT showed small ascites, no definite evidence ofh abscess, stent in commmon bile duct, and air in gallbladder fossa with ? Bile leak.  REVIEW OF SYSTEMS  As above, all other systems negative.  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    SOCIAL HISTORY  Social History     Tobacco  Use   • Smoking status: Current Every Day Smoker     Packs/day: 0.50     Years: 30.00     Pack years: 15.00     Types: Cigarettes   • Smokeless tobacco: Never Used   Vaping Use   • Vaping Use: Never used   Substance and Sexual Activity   • Alcohol use: Not Currently   • Drug use: Yes     Comment: Marijuana   • Sexual activity: Not on file       SURGICAL HISTORY   has a past surgical history that includes hysterectomy, vaginal (2006); thyroid lobectomy (1973); lumpectomy (1976, 2005); cervical disk and fusion anterior (03/12/08); tonsillectomy (1963); cervical fusion posterior (1/16/2009); hardware removal neuro (1/16/2009); neck exploration (1/16/2009); abdominal hysterectomy total; lumpectomy; lumbar laminectomy diskectomy (Right, 5/10/2016); shoulder decompression arthroscopic (6/17/08); clavicle distal excision (6/17/08); shoulder arthroscopy w/ rotator cuff repair (10/9/08); njx aa&/strd tfrml epi lumbar/sacral 1 level (Right, 8/31/2016); spinal cord stimulator (N/A, 10/26/2018); thoracic laminectomy (N/A, 10/26/2018); appendectomy (2004); implant neurostim/ (N/A, 12/16/2019); irrigation & debridement neuro (1/19/2020); cath placement (1/25/2020); ercp,diagnostic (N/A, 10/26/2021); upper gi endoscopy,biopsy (N/A, 10/26/2021); upper gi endoscopy,diagnosis (N/A, 10/26/2021); and peter by laparoscopy (N/A, 10/28/2021).    CURRENT MEDICATIONS  Reviewed.  See Encounter Summary.  Include   Home Medications    Medication Sig Taking? Last Dose Authorizing Provider   NARCAN 4 MG/0.1ML Liquid Administer 1 Spray into affected nostril(S) as needed. Indications: Opioid Overdose   Physician Outpatient   spironolactone (ALDACTONE) 25 MG Tab Take 1 Tablet by mouth every day.   Dixon Chicas M.D.   furosemide (LASIX) 40 MG Tab Take 1 Tablet by mouth every day.   Dixon Chicas M.D.   ursodiol (ACTIGALL) 300 MG Cap Take 1 Capsule by mouth every day.   John Javier M.D.   Ascorbic Acid (VITAMIN C PO) Take 1 Tablet  by mouth every morning.   Physician Outpatient   vitamin D3 (QC VITAMIN D3) 5000 Unit (125 mcg) Tab Take 5,000 Units by mouth 2 times a day.   Physician Outpatient   pyridoxine (VITAMIN B-6) 50 MG Tab Take 50 mg by mouth every morning.   Physician Outpatient   insulin aspart (NOVOLOG FLEXPEN) 100 UNIT/ML injection PEN Inject 1-5 Units under the skin in the morning, at noon, and at bedtime. 1 units for every 5 g of carbs   AND  Sliding Scale   150 - 200 = 1 units  201 - 250 = 2 units  251 - 300 = 3 units  301 - 350 = 4 units  351 - 400 = 5 units   Physician Outpatient   levothyroxine (SYNTHROID) 25 MCG Tab Take 25 mcg by mouth every morning on an empty stomach. 25mcg tablet + 200mcg tablet for a total dose of 225mcg   Physician Outpatient   B Complex Vitamins (VITAMIN B COMPLEX PO) Take 1 Tablet by mouth every morning.   Physician Outpatient   Docusate Calcium (STOOL SOFTENER PO) Take 1 Tablet by mouth every 48 hours.   Physician Outpatient   traZODone (DESYREL) 100 MG Tab Take 100 mg by mouth at bedtime.   Physician Outpatient   pantoprazole (PROTONIX) 40 MG Tablet Delayed Response Take 40 mg by mouth every morning.   Physician Outpatient   ondansetron (ZOFRAN ODT) 4 MG TABLET DISPERSIBLE Take 1 Tablet by mouth every 6 hours as needed for Nausea.   Keaton Chang M.D.   TRESIBA FLEXTOUCH 100 UNIT/ML Solution Pen-injector Inject 24 Units under the skin every evening.   Physician Outpatient   ALPRAZolam (XANAX) 0.5 MG Tab Take 0.5 mg by mouth 3 times a day as needed for Anxiety.   Physician Outpatient   sertraline (ZOLOFT) 100 MG Tab Take 100 mg by mouth every evening.   Physician Outpatient   SYNTHROID 200 MCG Tab Take 200 mcg by mouth every morning. 200mcg tablet + 25mcg tablet for a total dose of 225mcg   Physician Outpatient   metoprolol (TOPROL-XL) 200 MG XL tablet Take 1 tablet by mouth every day.   Jamie Peguero M.D.   Continuous Blood Gluc Sensor (FREESTYLE PARISA 14 DAY SENSOR) Misc 1 MISCELLANEOUS  "E10.42 CHANGE SENSOR EVERY 14 DAYS   E11.65   Physician Outpatient   aspirin 81 MG EC tablet Take 1 Tab by mouth every morning.   MARK Everett         ALLERGIES  Allergies   Allergen Reactions   • Tape Unspecified     Blisters, paper tape is ok       PHYSICAL EXAM  VITAL SIGNS: /72   Pulse 69   Temp 36.8 °C (98.3 °F) (Temporal)   Resp 20   Ht 1.676 m (5' 6\")   Wt 62.6 kg (138 lb)   LMP 04/29/2005 (LMP Unknown)   SpO2 98%   BMI 22.27 kg/m²   Constitutional: Writhing in pain   HENT: Normocephalic, Bilateral external ears normal. Nose normal.   Eyes: Pupils are equal and reactive. Conjunctiva normal, jaundiced  Thorax & Lungs: Easy unlabored respirations  Abdomen:  No gross signs of peritonitis, no pain with movement, right upper quadrant tenderness postsurgical site  Skin: Visualized skin is  Dry, No erythema, No rash.   Extremities:   No edema, No asymmetry  Neurologic: Alert, Grossly non-focal.   Psychiatric: Affect and Mood normal      COURSE & MEDICAL DECISION MAKING  Nursing notes and vital signs were reviewed. (See chart for details)    The patient presents to the Emergency Department with persistent right upper quadrant abdominal pain she is writhing in pain, she is complex has had multiple evaluations, she denies any fever, she states the pain is persistent and has been there for months but worsened this morning and woke her from bed  CT-ABDOMEN-PELVIS WITH   Final Result         1.  Fluid and air within the gallbladder fossa, concerning for abscess given timing since prior surgery, stable since prior study.   2.  Atherosclerosis and atherosclerotic coronary artery disease   3.  Pulmonary nodule, see nodule follow-up recommendations below.      Fleischner Society pulmonary nodule recommendations:      Low and High Risk: Consider CT at 3 months, PET/CT, or tissue sampling.      Low Risk - Minimal or absent history of smoking and of other known risk factors.      High Risk - " History of smoking or of other known risk factors.      Note: These recommendations do not apply to lung cancer screening, patients with immunosuppression, or patients with known primary cancer.      Fleischner Society 2017 Guidelines for Management of Incidentally Detected Pulmonary Nodules in Adults         DX-CHEST-PORTABLE (1 VIEW)   Final Result         1.  No acute cardiopulmonary disease.        Results for orders placed or performed during the hospital encounter of 11/10/21   CBC WITH DIFFERENTIAL   Result Value Ref Range    WBC 11.8 (H) 4.8 - 10.8 K/uL    RBC 2.61 (L) 4.20 - 5.40 M/uL    Hemoglobin 8.5 (L) 12.0 - 16.0 g/dL    Hematocrit 26.8 (L) 37.0 - 47.0 %    .7 (H) 81.4 - 97.8 fL    MCH 32.6 27.0 - 33.0 pg    MCHC 31.7 (L) 33.6 - 35.0 g/dL    RDW 61.0 (H) 35.9 - 50.0 fL    Platelet Count 461 (H) 164 - 446 K/uL    MPV 11.6 9.0 - 12.9 fL    Neutrophils-Polys 77.20 (H) 44.00 - 72.00 %    Lymphocytes 9.10 (L) 22.00 - 41.00 %    Monocytes 8.70 0.00 - 13.40 %    Eosinophils 3.10 0.00 - 6.90 %    Basophils 1.50 0.00 - 1.80 %    Immature Granulocytes 0.40 0.00 - 0.90 %    Nucleated RBC 0.00 /100 WBC    Neutrophils (Absolute) 9.12 (H) 2.00 - 7.15 K/uL    Lymphs (Absolute) 1.08 1.00 - 4.80 K/uL    Monos (Absolute) 1.03 (H) 0.00 - 0.85 K/uL    Eos (Absolute) 0.37 0.00 - 0.51 K/uL    Baso (Absolute) 0.18 (H) 0.00 - 0.12 K/uL    Immature Granulocytes (abs) 0.05 0.00 - 0.11 K/uL    NRBC (Absolute) 0.00 K/uL   COMP METABOLIC PANEL   Result Value Ref Range    Sodium 131 (L) 135 - 145 mmol/L    Potassium 4.0 3.6 - 5.5 mmol/L    Chloride 94 (L) 96 - 112 mmol/L    Co2 27 20 - 33 mmol/L    Anion Gap 10.0 7.0 - 16.0    Glucose 290 (H) 65 - 99 mg/dL    Bun 17 8 - 22 mg/dL    Creatinine 0.83 0.50 - 1.40 mg/dL    Calcium 8.2 (L) 8.4 - 10.2 mg/dL    AST(SGOT) 171 (H) 12 - 45 U/L    ALT(SGPT) 95 (H) 2 - 50 U/L    Alkaline Phosphatase 2398 (H) 30 - 99 U/L    Total Bilirubin 7.1 (H) 0.1 - 1.5 mg/dL    Albumin 2.4 (L) 3.2 -  4.9 g/dL    Total Protein 6.2 6.0 - 8.2 g/dL    Globulin 3.8 (H) 1.9 - 3.5 g/dL    A-G Ratio 0.6 g/dL   LIPASE   Result Value Ref Range    Lipase 25 7 - 58 U/L   ESTIMATED GFR   Result Value Ref Range    GFR If African American >60 >60 mL/min/1.73 m 2    GFR If Non African American >60 >60 mL/min/1.73 m 2     Patient's care was discussed with Dr. Peralta GI,  specifically the fact that she has persistent fluid collection with air and pain.  Otherwise her labs appear slightly improved..  He is concerned this may represent a bile leak and states we need to do a HIDA scan on her to exclude this possibility.  Our machine is not functional for body imaging only for cardiac scans.  This was discussed with Dr. Dupont at [facility] so that the patient can be transferred over for evaluation urgently a persistent bile leak.    The patient has been made aware that she cannot have pain medication for 6 hours prior to imaging.  Her last pain medication was administered at 6 AM.  DISPOSITION:    Transfer [facility] for emergent HIDA scan    FINAL IMPRESSION   Right upper quadrant abdominal pain, acute on chronic  Concern for bile leak  Liver failure

## 2021-11-10 NOTE — H&P
Hospital Medicine History & Physical Note    Date of Service  11/10/2021    Primary Care Physician  Jarrell Yates M.D.    Consultants  GI  Specialist Names: Dr. Blanchard     Code Status  DNAR/DNI    Chief Complaint  Chief Complaint   Patient presents with   • Abdominal Pain     started 10pm last night   • Back Pain       History of Presenting Illness  Anu Manuel is a 66 y.o. F with chronic liver disease (unclear etiology) DM1 with neuropathy, CAD (s/p 2 stents), hypothyroidism, HTN  who presented 11/10/2021 with c/o acute worsening RUQ abdominal pain radiating to back; associated nausea and vomiting. Pt initially presented [facility] ED - difficult to control pain. Workup at [facility]: her total bilirubin, alk phos, transaminases are markedly elevated although are at baseline for her, white blood cell count 11, and CT with fluid and air in the gallbladder fossa that was stable since prior study. CT findings discussed with GI Dr. Soto who recommended HIDA. Pt was then transferred to [facility]. At [facility], HIDA scan cannot be initially as Pt's received opioids earlier (plus concern about study's validity in the setting of hyperbilirubin). ERP Dr. Champion discussed the case with GI on call Dr. Blanchard - unclear what other studies or interventions may benefit at this point. Extensive care plan discussed with Pt's daughter (POA), she reported she is interested in making her mother more comfortable; potential for hospice and not sure they want to pursue further diagnostic studies. Medical service was then referred for pain management and further facilitate goals of care.     I discussed care plan with daughter Farzaneh after her discussion with ERP - she is in agreement with a referral for Hospice. Her goal is make Pt comfortable and be able to make the planned trip. We did discuss about questionable abscess and GI's recommendation for surgery evaluation which daughter agrees.      Per history, Pt has been worked up for chronic  liver dysfunction (?idopathc; ?autoimmune) since 8/2021; seen by HERNANDO Traore and [facility]. Pt also had a recent admission at [facility] from 10/25-11/2 where she underwent ERCP for cholangitis and choledocholithiasis - sphincterotomy and biliary stent placed. Surgery team also performed lap cholecystectomy before DC (initially required ALBINO drain - negative cultures). Since DC, Pt reportedly continued to have abd pain though mostly manageable. She has visited [facility] ED on 11/7 - CT abd at that time did not show obvious abscess, infection or fluid leak and DC home.     I discussed the plan of care with patient, family and general surgery and ERP. GI    Review of Systems  Review of Systems   Reason unable to perform ROS: Limited; as Pt just received IV opioid and benzo.       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    Surgical History   has a past surgical history that includes hysterectomy, vaginal (2006); thyroid lobectomy (1973); lumpectomy (1976, 2005); cervical disk and fusion anterior (03/12/08); tonsillectomy (1963); cervical fusion posterior (1/16/2009); hardware removal neuro (1/16/2009); neck exploration (1/16/2009); abdominal hysterectomy total; lumpectomy; lumbar laminectomy diskectomy (Right, 5/10/2016); shoulder decompression arthroscopic (6/17/08); clavicle distal excision (6/17/08); shoulder arthroscopy w/ rotator cuff repair (10/9/08); pr njx aa&/strd tfrml  epi lumbar/sacral 1 level (Right, 2016); spinal cord stimulator (N/A, 10/26/2018); thoracic laminectomy (N/A, 10/26/2018); appendectomy (); pr implant neurostim/ (N/A, 2019); irrigation & debridement neuro (2020); cath placement (2020); pr ercp,diagnostic (N/A, 10/26/2021); pr upper gi endoscopy,biopsy (N/A, 10/26/2021); pr upper gi endoscopy,diagnosis (N/A, 10/26/2021); and peter by laparoscopy (N/A, 10/28/2021).     Family History  family history includes Cancer in her father; Hypertension in her mother.   Family history reviewed with patient. There is no family history that is pertinent to the chief complaint.     Social History   reports that she has been smoking cigarettes. She has a 15.00 pack-year smoking history. She has never used smokeless tobacco. She reports previous alcohol use. She reports current drug use.    Allergies  Allergies   Allergen Reactions   • Tape Unspecified     Blisters, paper tape is ok       Medications  Prior to Admission Medications   Prescriptions Last Dose Informant Patient Reported? Taking?   ALPRAZolam (XANAX) 0.5 MG Tab 2021 at PM Patient Yes No   Sig: Take 0.5 mg by mouth 3 times a day as needed for Anxiety.   Ascorbic Acid (VITAMIN C PO) 2021 at AM Patient Yes No   Sig: Take 1 Tablet by mouth every morning.   B Complex Vitamins (VITAMIN B COMPLEX PO) 2021 at AM Patient Yes No   Sig: Take 1 Tablet by mouth every morning.   Continuous Blood Gluc Sensor (FREESTYLE PARISA 14 DAY SENSOR) Mis UNK at UNK Patient Yes No   Si MISCELLANEOUS E10.42 CHANGE SENSOR EVERY 14 DAYS   E11.65   Docusate Calcium (STOOL SOFTENER PO) 2021 at PM Patient Yes No   Sig: Take 1 Tablet by mouth every evening.   NARCAN 4 MG/0.1ML Liquid PRN at PRN Patient Yes No   Sig: Administer 1 Spray into affected nostril(S) as needed. Indications: Opioid Overdose   SYNTHROID 200 MCG Tab 2021 at AM Patient Yes No   Sig: Take 200 mcg by mouth every morning.  200mcg tablet + 25mcg tablet for a total dose of 225mcg   TRESIBA FLEXTOUCH 100 UNIT/ML Solution Pen-injector 11/9/2021 at PM Patient Yes No   Sig: Inject 23 Units under the skin every evening.   aspirin 81 MG EC tablet 11/9/2021 at AM Patient No No   Sig: Take 1 Tab by mouth every morning.   furosemide (LASIX) 40 MG Tab 11/9/2021 at AM Patient No No   Sig: Take 1 Tablet by mouth every day.   insulin aspart (NOVOLOG FLEXPEN) 100 UNIT/ML injection PEN 11/9/2021 at PM Patient Yes No   Sig: Inject 1-5 Units under the skin in the morning, at noon, and at bedtime. 1 units for every 5 g of carbs   AND  Sliding Scale   150 - 200 = 1 units  201 - 250 = 2 units  251 - 300 = 3 units  301 - 350 = 4 units  351 - 400 = 5 units   levothyroxine (SYNTHROID) 25 MCG Tab 11/9/2021 at AM Patient Yes No   Sig: Take 25 mcg by mouth every morning on an empty stomach. 25mcg tablet + 200mcg tablet for a total dose of 225mcg   metoprolol (TOPROL-XL) 200 MG XL tablet 11/9/2021 at AM Patient No No   Sig: Take 1 tablet by mouth every day.   ondansetron (ZOFRAN ODT) 4 MG TABLET DISPERSIBLE 11/8/2021 at PRN Patient No No   Sig: Take 1 Tablet by mouth every 6 hours as needed for Nausea.   oxyCODONE immediate-release (ROXICODONE) 5 MG Tab 11/6/2021 at FINISHED Patient Yes Yes   Sig: Take 5 mg by mouth every 3 hours as needed for Severe Pain. Up to 3 days   pantoprazole (PROTONIX) 40 MG Tablet Delayed Response 11/9/2021 at AM Patient Yes No   Sig: Take 40 mg by mouth every morning.   pyridoxine (VITAMIN B-6) 50 MG Tab 11/9/2021 at AM Patient Yes No   Sig: Take 50 mg by mouth every morning.   sertraline (ZOLOFT) 100 MG Tab 11/9/2021 at PM Patient Yes No   Sig: Take 100 mg by mouth every evening.   spironolactone (ALDACTONE) 25 MG Tab 11/9/2021 at AM Patient No No   Sig: Take 1 Tablet by mouth every day.   traZODone (DESYREL) 100 MG Tab 11/9/2021 at HS Patient Yes No   Sig: Take 100 mg by mouth at bedtime.   ursodiol (ACTIGALL) 300 MG Cap 11/9/2021 at  AM Patient No No   Sig: Take 1 Capsule by mouth every day.   vitamin D3 (QC VITAMIN D3) 5000 Unit (125 mcg) Tab 11/9/2021 at PM Patient Yes No   Sig: Take 5,000 Units by mouth 2 times a day.      Facility-Administered Medications: None       Physical Exam  Temp:  [36.4 °C (97.6 °F)-36.8 °C (98.3 °F)] 36.7 °C (98 °F)  Pulse:  [57-69] 65  Resp:  [12-41] 20  BP: (136-177)/(58-93) 164/78  SpO2:  [92 %-100 %] 100 %  Blood Pressure: (!) 162/72   Temperature: 36.6 °C (97.9 °F)   Pulse: 64   Respiration: 14   Pulse Oximetry: 92 %       Physical Exam  Vitals and nursing note reviewed.   Constitutional:       General: She is not in acute distress.     Appearance: Normal appearance. She is ill-appearing.   HENT:      Head: Normocephalic and atraumatic.      Mouth/Throat:      Mouth: Mucous membranes are moist.      Pharynx: Oropharynx is clear.   Eyes:      General: Scleral icterus present.   Cardiovascular:      Rate and Rhythm: Normal rate and regular rhythm.   Pulmonary:      Effort: Pulmonary effort is normal. No respiratory distress.   Abdominal:      General: There is no distension.      Palpations: Abdomen is soft.      Tenderness: There is no abdominal tenderness.   Musculoskeletal:         General: No swelling.   Skin:     General: Skin is warm and dry.      Capillary Refill: Capillary refill takes less than 2 seconds.      Coloration: Skin is jaundiced.   Neurological:      Mental Status: Mental status is at baseline. She is lethargic.         Laboratory:  Recent Labs     11/10/21  0523   WBC 11.8*   RBC 2.61*   HEMOGLOBIN 8.5*   HEMATOCRIT 26.8*   .7*   MCH 32.6   MCHC 31.7*   RDW 61.0*   PLATELETCT 461*   MPV 11.6     Recent Labs     11/10/21  0523   SODIUM 131*   POTASSIUM 4.0   CHLORIDE 94*   CO2 27   GLUCOSE 290*   BUN 17   CREATININE 0.83   CALCIUM 8.2*     Recent Labs     11/10/21  0523   ALTSGPT 95*   ASTSGOT 171*   ALKPHOSPHAT 2398*   TBILIRUBIN 7.1*   LIPASE 25   GLUCOSE 290*         No results for  input(s): NTPROBNP in the last 72 hours.      No results for input(s): TROPONINT in the last 72 hours.    Imaging:  NM-HEPATOBILIARY SCAN    (Results Pending)   [id number]    (Results Pending)       no X-Ray or EKG requiring interpretation    Assessment/Plan:  I anticipate this patient is appropriate for observation status at this time.    * Right upper quadrant pain- (present on admission)  Assessment & Plan  Acute on chronic with unclear etiology   Extensive workups being done while still continued to be in severe pain with poor quality of life   Liver enzymes are elevated but appeared baseline for her  Goals being discussed    GI also further guiding care plan  Pain management and supportive care meanwhile    Advanced care planning/counseling discussion- (present on admission)  Assessment & Plan  I discussed care plan with daughter Farzaneh after her discussion with ERP - she is in agreement with a referral for Hospice.   Her goal is make Pt comfortable and be able to make the planned trip.   We did discuss about questionable abscess and GI's recommendation for surgery evaluation which daughter agrees.     Total time spent 15 minutes    Fluid collection at surgical site- (present on admission)  Assessment & Plan  Questionable abscess collection  General surgery consulted - recs appreciated     Anasarca- (present on admission)  Assessment & Plan  C/w home diuretics dosing for now     Jaundice- (present on admission)  Assessment & Plan  Chronic with hyperbilirubinemia     Elevated liver enzymes- (present on admission)  Assessment & Plan  Her total bilirubin, alk phos, transaminases are markedly elevated although are at baseline for her  GI consulted     GERD (gastroesophageal reflux disease)- (present on admission)  Assessment & Plan  C/w PPI    RHEA (acute kidney injury) (HCC)- (present on admission)  Assessment & Plan  Chronic macrocytic   Monitor     Type 1 diabetes mellitus with neurological manifestations,  uncontrolled (HCC)- (present on admission)  Assessment & Plan  Will continue with insulin glargine and ISS     Hypothyroidism, postsurgical- (present on admission)  Assessment & Plan  C/w home levothyroxine       VTE prophylaxis: SCDs/TEDs

## 2021-11-10 NOTE — ED PROVIDER NOTES
ED Provider Note    ER PROVIDER NOTE        CHIEF COMPLAINT  Chief Complaint   Patient presents with   • Abdominal Pain     started 10pm last night   • Back Pain       HPI  Anu Manuel is a 66 y.o. female who presents to the emergency department complaining of abdominal and back pain.  Patient reports she has had pain for 4 months.  She has known hepatitis and liver failure and has had recent biopsies as well.  This was done at [facility] and she has had prior care at [facility] as well.  He has had multiple similar visits for the same.  He had ERCP on October 24 followed by cholecystectomy with concern for chronic cholecystitis.  She had a drainage tube placed November 2 for postoperative fluid collection.    This pain has been present for 4 months although does seem to come and go in severity.  She does have some intermittent vomiting with it as well.  No fevers or chills.  No cough or congestion.  No chest pain.  No dysuria or hematuria    [facility] Dr. De La Vega sent her here for HIDA scan    REVIEW OF SYSTEMS  Pertinent positives include abdominal pain  . Pertinent negatives include no fever. See HPI for details. All other systems reviewed and are negative.    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    SURGICAL HISTORY   has a past surgical history that includes hysterectomy, vaginal (2006); thyroid lobectomy  (1973); lumpectomy (1976, 2005); cervical disk and fusion anterior (03/12/08); tonsillectomy (1963); cervical fusion posterior (1/16/2009); hardware removal neuro (1/16/2009); neck exploration (1/16/2009); abdominal hysterectomy total; lumpectomy; lumbar laminectomy diskectomy (Right, 5/10/2016); shoulder decompression arthroscopic (6/17/08); clavicle distal excision (6/17/08); shoulder arthroscopy w/ rotator cuff repair (10/9/08); njx aa&/strd tfrml epi lumbar/sacral 1 level (Right, 8/31/2016); spinal cord stimulator (N/A, 10/26/2018); thoracic laminectomy (N/A, 10/26/2018); appendectomy (2004); implant neurostim/ (N/A, 12/16/2019); irrigation & debridement neuro (1/19/2020); cath placement (1/25/2020); ercp,diagnostic (N/A, 10/26/2021); upper gi endoscopy,biopsy (N/A, 10/26/2021); upper gi endoscopy,diagnosis (N/A, 10/26/2021); and peter by laparoscopy (N/A, 10/28/2021).    FAMILY HISTORY  Family History   Problem Relation Age of Onset   • Hypertension Mother    • Cancer Father        SOCIAL HISTORY  Social History     Socioeconomic History   • Marital status:      Spouse name: Not on file   • Number of children: Not on file   • Years of education: Not on file   • Highest education level: Not on file   Occupational History   • Not on file   Tobacco Use   • Smoking status: Current Every Day Smoker     Packs/day: 0.50     Years: 30.00     Pack years: 15.00     Types: Cigarettes   • Smokeless tobacco: Never Used   Vaping Use   • Vaping Use: Never used   Substance and Sexual Activity   • Alcohol use: Not Currently   • Drug use: Yes     Comment: Marijuana   • Sexual activity: Not on file   Other Topics Concern   • Not on file   Social History Narrative   • Not on file     Social Determinants of Health     Financial Resource Strain: Low Risk    • Difficulty of Paying Living Expenses: Not very hard   Food Insecurity: No Food Insecurity   • Worried About Running Out of Food in the Last Year: Never true   •  Ran Out of Food in the Last Year: Never true   Transportation Needs: No Transportation Needs   • Lack of Transportation (Medical): No   • Lack of Transportation (Non-Medical): No   Physical Activity:    • Days of Exercise per Week: Not on file   • Minutes of Exercise per Session: Not on file   Stress:    • Feeling of Stress : Not on file   Social Connections:    • Frequency of Communication with Friends and Family: Not on file   • Frequency of Social Gatherings with Friends and Family: Not on file   • Attends Mandaen Services: Not on file   • Active Member of Clubs or Organizations: Not on file   • Attends Club or Organization Meetings: Not on file   • Marital Status: Not on file   Intimate Partner Violence:    • Fear of Current or Ex-Partner: Not on file   • Emotionally Abused: Not on file   • Physically Abused: Not on file   • Sexually Abused: Not on file   Housing Stability:    • Unable to Pay for Housing in the Last Year: Not on file   • Number of Places Lived in the Last Year: Not on file   • Unstable Housing in the Last Year: Not on file      Social History     Substance and Sexual Activity   Drug Use Yes    Comment: Marijuana       CURRENT MEDICATIONS  Home Medications     Reviewed by Stone Martínez (Pharmacy Tech) on 11/10/21 at [facility]  Med List Status: Complete   Medication Last Dose Status   ALPRAZolam (XANAX) 0.5 MG Tab 11/9/2021 Active   Ascorbic Acid (VITAMIN C PO) 11/9/2021 Active   aspirin 81 MG EC tablet 11/9/2021 Active   B Complex Vitamins (VITAMIN B COMPLEX PO) 11/9/2021 Active   Continuous Blood Gluc Sensor (FREESTYLE PARISA 14 DAY SENSOR) Misc UNK Active   Docusate Calcium (STOOL SOFTENER PO) 11/9/2021 Active   furosemide (LASIX) 40 MG Tab 11/9/2021 Active   insulin aspart (NOVOLOG FLEXPEN) 100 UNIT/ML injection PEN 11/9/2021 Active   levothyroxine (SYNTHROID) 25 MCG Tab 11/9/2021 Active   metoprolol (TOPROL-XL) 200 MG XL tablet 11/9/2021 Active   NARCAN 4 MG/0.1ML Liquid PRN Active  "  ondansetron (ZOFRAN ODT) 4 MG TABLET DISPERSIBLE 11/8/2021 Active   oxyCODONE immediate-release (ROXICODONE) 5 MG Tab 11/6/2021 Active   pantoprazole (PROTONIX) 40 MG Tablet Delayed Response 11/9/2021 Active   pyridoxine (VITAMIN B-6) 50 MG Tab 11/9/2021 Active   sertraline (ZOLOFT) 100 MG Tab 11/9/2021 Active   spironolactone (ALDACTONE) 25 MG Tab 11/9/2021 Active   SYNTHROID 200 MCG Tab 11/9/2021 Active   traZODone (DESYREL) 100 MG Tab 11/9/2021 Active   TRESIBA FLEXTOUCH 100 UNIT/ML Solution Pen-injector 11/9/2021 Active   ursodiol (ACTIGALL) 300 MG Cap 11/9/2021 Active   vitamin D3 (QC VITAMIN D3) 5000 Unit (125 mcg) Tab 11/9/2021 Active                ALLERGIES  Allergies   Allergen Reactions   • Tape Unspecified     Blisters, paper tape is ok       PHYSICAL EXAM  VITAL SIGNS: BP (!) 162/72   Pulse 64   Temp 36.6 °C (97.9 °F) (Temporal)   Resp 14   Ht 1.676 m (5' 6\")   Wt 62.6 kg (138 lb)   LMP 04/29/2005 (LMP Unknown)   SpO2 92%   BMI 22.27 kg/m²   Pulse ox interpretation: I interpret this pulse ox as normal.    Constitutional: Alert, uncomfortable appearing  HENT: No signs of trauma, Bilateral external ears normal, Nose normal.   Eyes: Pupils are equal and reactive, Conjunctiva normal, Non-icteric.   Neck: Normal range of motion, No tenderness, Supple, No stridor.   Lymphatic: No lymphadenopathy noted.   Cardiovascular: Regular rate and rhythm, no murmurs.   Thorax & Lungs: Normal breath sounds, No respiratory distress, No wheezing, No chest tenderness.   Abdomen: Bowel sounds normal, Soft, epigastric and right upper quadrant no masses, No pulsatile masses. No peritoneal signs.  Skin: Warm, Dry, No erythema, No rash.   Back: No bony tenderness, No CVA tenderness.   Extremities: Intact distal pulses, No edema, No tenderness, No cyanosis  Musculoskeletal: Good range of motion in all major joints. No tenderness to palpation or major deformities noted.   Neurologic: Alert , Normal motor function, Normal " sensory function, No focal deficits noted.   Psychiatric: anxious    DIAGNOSTIC STUDIES / PROCEDURES      LABS    All labs reviewed by me.    RADIOLOGY  NM-HEPATOBILIARY SCAN    (Results Pending)     The radiologist's interpretation of all radiological studies have been reviewed and images independently viewed by me.    COURSE & MEDICAL DECISION MAKING  Nursing notes, VS, PMSFHx reviewed in chart.    9:50 AM Patient seen and examined at bedside.   I reviewed the patient's labs from [facility], her total bilirubin, alk phos, transaminases are markedly elevated although are at baseline for her, white blood cell count 11, and CT with fluid and air in the gallbladder fossa that was stable since prior study    9:56 AM I discussed HIDA with nuclear med and because she got fentanyl at 7 am until 1pm    She can however receive nonopioid pain medications so is ordered for Ativan as well as Toradol    Patient is placed into ED observation at 9:56 AM as she needs a HIDA scan for her abdominal symptoms and cannot receive the HIDA scan until 1 PM    Additionally in discussion with nuclear medicine, her HIDA scan will likely be nondiagnostic because of her elevated bilirubin      11:30 PM  Patient is more comfortable, appears to be resting after the Ativan and Toradol    1:18 PM  Patient was unable to complete her HIDA scan she cannot hold that she cannot hold still, GI has been paged      I discussed the case with Dr. Blanchard, we reviewed the diagnostics performed and her case up to this point.  At this point it is unclear what other studies or interventions may benefit her.     As she does have continued pain, we will plan to admit the patient for pain control, further GI recommendations as needed    Patient was reevaluated, she is resting comfortably, after Ativan and hydromorphone.  Her daughter is at the bedside is who is her power of  apparently.  I discussed the course so far with her daughter, she states that  she is not sure they want to pursue more diagnostics but very interested in trying to make her mother comfortable.  Potential for hospice is well.      Given this we will plan for admission for pain control, GI consultation, and potential hospice initiation depending on the daughter's conversation with her mother and further wishes    2:03 PM  Patient is discharged from ED observation at 2:03 PM on 11/10, 2021 with disposition of admission to the hospital/CDU in stable condition        Decision Making:  This is a 66 y.o. female presenting with recurrent abdominal pain.  It is unclear the etiology for the patient's pain although it does appear she has some significant chronic pain.  She does have progressive liver disease of unclear etiology although does have known hepatitis.  She is pending biopsies from [place].  She did have a recent ERCP as well as cholecystectomy.  On multiple CT scans she has had a small amount of fluid and air in the gallbladder fossa.  As above HIDA scan was unable to be obtained and a low likelihood of diagnostic per nuclear medicine.  Her white blood cell count is decreasing and she has no fevers suggestive of infectious process.     Patient is admitted in guarded condition to CDU    FINAL IMPRESSION  1. Right upper quadrant abdominal pain    2. Liver disease         The note accurately reflects work and decisions made by me.  Minesh Champion M.D.  11/10/2021  3:39 PM

## 2021-11-10 NOTE — ED NOTES
"Chief Complaint   Patient presents with   • Back Pain     Pt BIB [facility] for back Pn that radiates to her ribs on both sides; Pt stated that she was sleeping and was woken up by the Pn; Hx of back problems and liver failure; +Nausea, -V/D; - blood thinners;   • Rib Pain     ED Triage Vitals   Enc Vitals Group      Blood Pressure 11/10/21 0524 148/76      Pulse 11/10/21 0524 61      Respiration 11/10/21 0518 18      Temperature 11/10/21 0518 36.8 °C (98.3 °F)      Temp src 11/10/21 0518 Temporal      Pulse Oximetry 11/10/21 0524 99 %      Weight 11/10/21 0518 62.6 kg (138 lb)      Height 11/10/21 0518 1.676 m (5' 6\")      Head Circumference --       Peak Flow --       Pain Score --       Pain Loc --       Pain Edu? --       Excl. in GC? --        "

## 2021-11-10 NOTE — ED NOTES
Pharmacy Medication Reconciliation      ~Medication reconciliation updated and complete per patient at bedside with medication list. Reviewed list with patient and returned at bedside  ~Allergies have been verified and updated   ~No oral ABX within the last 30 days  ~Patient home pharmacy:[facility]      ~Patient reports she was recently discharged from [facility] on 11-

## 2021-11-10 NOTE — ED NOTES
Pt notified by ERP that the HIDA scan cannot be completed until the pt has been off of pain meds for 6 hrs. Pt receptive.

## 2021-11-10 NOTE — ED NOTES
Report received from [facility].     Pt screaming out in pain. Pt received 100 mcg of fentanyl at 0600. Dr. De La Vega was notified and is currently at bedside.

## 2021-11-10 NOTE — PROGRESS NOTES
VAISHNAVI Gann sent a second referral and update to [facility] notifying the company of the patient's admission to the ED.     VAISHNAVI will not follow as the patient has moved on to [facility].

## 2021-11-10 NOTE — ED NOTES
Pt could tolerate the scan. The [facility] staff reports she cannot attempt the scan again for 48 hrs. MD aware. Pt and family aware.

## 2021-11-10 NOTE — DISCHARGE PLANNING
Call from Dr Mccloud to speak with daughter regarding hospice.    BOBBY met with Farzaneh Angel (POA) who does want hospice but would like to hold off on them coming to meet with her mother until she has spoken with her siblings and her mother together.     Her father  one year ago and was on [facility]. She is fine signing choice but wants to be called by them prior to them meeting with her mother. BOBBY spoke Rola at [facility] and she will call Farzaneh before anything else.    Choice faxed to [facility].

## 2021-11-10 NOTE — CONSULTS
Gastroenterology Consult Note:    MARK Galarza  Date & Time note created:    11/10/2021   2:30 PM     Referring MD:  Dr. Minesh Champion    Patient ID:  Name:             Anu Manuel   YOB: 1955  Age:                 66 y.o.  female   MRN:               [id number]                                                             Reason for Consult:      Back pain  History cholecystectomy status post ERCP with stent placement    History of Present Illness:    This is a 66-year-old female, PMH liver disease, unknown etiology, type 1 diabetes, GERD, hyperlipidemia, hypertension, hypothyroidism, polyneuropathy who presented to the emergency room 11/10/2021 with right upper quadrant pain radiating to mid back.  Labs 11/10/2021: WBC 11.8.  Hemoglobin 8.5.  Hematocrit 26.8.  Sodium 131.  Potassium 4.0.  Glucose 290.  BUN 17.  Creatinine 0.83.  Total bilirubin 7.1.  .  ALT 95.  Alkaline phosphatase 2398.  Lipase 25.    CT scan abdomen/pelvis 11/10/2021: Fluid and air within the gallbladder fossa, concerning for abscess given timing since prior surgery.  Stable since prior study.       Approximately 6 weeks ago, she became jaundiced. Diagnosed with liver disease--underwent a liver biopsy, pending interpretation from [place].  Since then, she has had severe right upper quadrant pain that would wax and wane in severity radiating to mid back.  ERCP 10/26/2021 for cholangitis, choledocholithiasis -findings: Normal-appearing bile duct status post ERCP sphincterotomy with cytology brushings and placement of a prophylactic 10 Latvian by 5 cm plastic biliary stent, J-shaped stomach.  Cytology brushings-no malignancy.  She had a laparoscopic cholecystectomy 10/28/2021 with findings of chronic cholecystitis.    Since the laparoscopic cholecystectomy and ERCP, patient has been seen in the emergency room on 2-3 occasions with severe epigastric/right upper quadrant radiating to mid back pain.   "Evaluated in the emergency room 11/7/2021 for the symptoms.  WBC: 13.6.  Hemoglobin 8.7.  Hematocrit 28.1.  Platelet count 480.  Total bilirubin 7.6.  .  .  Alkaline phosphatase 2578.    CT scan abdomen/pelvis 11/7/21: Interval cholecystectomy with gas at the gallbladder fossa which is atypical 2 weeks postprocedure and worrisome for infection but no abscess is detected. New small perihepatic and left anterior pararenal space small ascites without abscess seen. This could be secondary to pancreatitis. Biliary leak is possible but no fluid collecting in the right paracolic gutter is seen to support this conclusion. Common   bile duct stent is present. Consider HIDA scan to evaluate for leak.  She was discharged home-told to follow-up with her general surgeon.      According to her daughter, who is her POA, patient continued to experience epigastric/right upper quadrant pain but now the pain is more in her mid/upper back.  They have not received the results of the liver biopsy.  Her daughter was tearful stating \"I just want to take my mom to [facility] then have her go on hospice.\"  Apparently, there is a trip planned for this Friday.    Review of Systems:      Review of Systems   Constitutional: Positive for malaise/fatigue.   HENT: Negative.    Eyes: Negative.    Respiratory: Negative.    Cardiovascular: Negative.    Gastrointestinal: Positive for abdominal pain, nausea and vomiting.   Genitourinary: Negative.    Musculoskeletal: Positive for back pain.   Skin: Negative.    Neurological: Positive for weakness.   Psychiatric/Behavioral: Negative.              Physical Exam:  Vitals/ General Appearance:   Weight/BMI: Body mass index is 22.27 kg/m².    Vitals:    11/10/21 0908 11/10/21 1015 11/10/21 1042   BP: (!) 177/74 149/68 153/70   Pulse: 61 62 61   Resp: 18  14   Temp: 36.6 °C (97.9 °F)     TempSrc: Temporal     SpO2: 97% 98% 100%   Weight: 62.6 kg (138 lb)     Height: 1.676 m (5' 6\")       Oxygen " "Therapy:  Pulse Oximetry: 100 %, O2 Delivery Device: None - Room Air    Physical Exam  Vitals and nursing note reviewed.   Constitutional:       Appearance: She is ill-appearing.   HENT:      Head: Normocephalic and atraumatic.      Mouth/Throat:      Mouth: Mucous membranes are dry.   Eyes:      General: Scleral icterus present.      Extraocular Movements: Extraocular movements intact.      Pupils: Pupils are equal, round, and reactive to light.   Cardiovascular:      Rate and Rhythm: Normal rate and regular rhythm.      Pulses: Normal pulses.      Heart sounds: Normal heart sounds.   Pulmonary:      Effort: Pulmonary effort is normal.      Breath sounds: Rhonchi present.   Abdominal:      General: Bowel sounds are normal.      Palpations: Abdomen is soft.      Tenderness: There is abdominal tenderness. There is no guarding.   Musculoskeletal:         General: Normal range of motion.      Cervical back: Normal range of motion and neck supple.      Right lower leg: Edema (trace) present.      Left lower leg: Edema (trace) present.   Skin:     General: Skin is warm and dry.      Capillary Refill: Capillary refill takes less than 2 seconds.      Coloration: Skin is jaundiced.   Neurological:      Mental Status: She is oriented to person, place, and time. She is lethargic.         Past Medical History:   Past Medical History:   Diagnosis Date   • Adverse effect of anesthesia     in 2008 \"throat closes up\"\"anxiety\" ?laryngospasm, kept in ICU. Pt states no problems currently 2018.    • Anesthesia     in 2008 \"throat closes up\"\"anxiety\" ?laryngospasm, kept in ICU. Pt states no problems currently 2018.    • Arthritis     right shoulder, hands   • Cigarette smoker quit 2013   • Dental disorder     missing teeth    • depression 8/30/2016    denies depression, states has anxiety and panic attacks   • Diabetes mellitus type 1 ([facility]) 1989    insulin   • Encounter for long-term (current) use of insulin ([facility]) 9/25/2013   • Heart " "burn    • High cholesterol    • Hypertension    • Hypothyroidism, postsurgical 1970   • Indigestion    • Infectious disease      had hepatitis C, tested neg.   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.63) 6/10/2015       Past Surgical History:  Past Surgical History:   Procedure Laterality Date   • THADDEUS BY LAPAROSCOPY N/A 10/28/2021    Procedure: CHOLECYSTECTOMY, LAPAROSCOPIC;  Surgeon: Tello Trammell M.D.;  Location: [facility];  Service: General   • PB ERCP,DIAGNOSTIC N/A 10/26/2021    Procedure: ERCP (ENDOSCOPIC RETROGRADE CHOLANGIOPANCREATOGRAPHY);  Surgeon: Cr Haro M.D.;  Location: SURGERY SAME DAY [facility];  Service: Gastroenterology   • PB UPPER GI ENDOSCOPY,BIOPSY N/A 10/26/2021    Procedure: GASTROSCOPY, WITH BIOPSY;  Surgeon: Cr Haro M.D.;  Location: SURGERY SAME DAY [facility];  Service: Gastroenterology   • PB UPPER GI ENDOSCOPY,DIAGNOSIS N/A 10/26/2021    Procedure: GASTROSCOPY;  Surgeon: Cr Haro M.D.;  Location: SURGERY SAME DAY [facility];  Service: Gastroenterology   • CATH PLACEMENT  1/25/2020    Procedure: INSERTION, CATHETER PERM;  Surgeon: Rola Mendoza M.D.;  Location: [facility];  Service: General   • IRRIGATION & DEBRIDEMENT NEURO  1/19/2020    Procedure: IRRIGATION AND DEBRIDEMENT, WOUND THORACIC AND LUMBAR;  Surgeon: Ryan Roman M.D.;  Location: [facility];  Service: Neurosurgery   • PB IMPLANT NEUROSTIM/ N/A 12/16/2019    Procedure: EXPLORATION AT THORACIC 8 - 9, REPLACEMENT OF  NEUROSTIMULATOR LEAD;  Surgeon: Ryan Roman M.D.;  Location: [facility];  Service: Neurosurgery   • SPINAL CORD STIMULATOR N/A 10/26/2018    Procedure: SPINAL CORD STIMULATOR;  Surgeon: Ryan Roman M.D.;  " Location: SURGERY [facility];  Service: Neurosurgery   • THORACIC LAMINECTOMY N/A 10/26/2018    Procedure: THORACIC LAMINECTOMY - FOR;  Surgeon: Ryan Roman M.D.;  Location: SURGERY [facility];  Service: Neurosurgery   • ND NJX AA&/STRD TFRML EPI LUMBAR/SACRAL 1 LEVEL Right 8/31/2016    Procedure: INJ-FORAMEN EPI LUM/SAC SNGL L4-5;  Surgeon: Sukhi Godfrey M.D.;  Location: SURGERY [facility];  Service: Pain Management   • LUMBAR LAMINECTOMY DISKECTOMY Right 5/10/2016    Procedure: LUMBAR L4-5 HEMILAMINECTOMY DISKECTOMY ;  Surgeon: Arnold Keyes M.D.;  Location: SURGERY [facility];  Service:    • CERVICAL FUSION POSTERIOR  1/16/2009    Performed by TARA CONTRERAS at [facility]   • HARDWARE REMOVAL NEURO  1/16/2009    Performed by TARA CONTRERAS at [facility]   • NECK EXPLORATION  1/16/2009    Performed by TARA CONTRERAS at [facility]   • SHOULDER ARTHROSCOPY W/ ROTATOR CUFF REPAIR  10/9/08    Performed by SHERLY CASTANEDA at [facility]   • SHOULDER DECOMPRESSION ARTHROSCOPIC  6/17/08    Performed by SHERLY CASTANEDA at [facility]   • CLAVICLE DISTAL EXCISION  6/17/08    Performed by SHERLY CASTANEDA at [facility]   • CERVICAL DISK AND FUSION ANTERIOR  03/12/08   • HYSTERECTOMY, VAGINAL  2006   • APPENDECTOMY  2004   • THYROID LOBECTOMY  1973   • TONSILLECTOMY  1963   • ABDOMINAL HYSTERECTOMY TOTAL     • LUMPECTOMY  1976, 2005    Breast    • LUMPECTOMY         Hospital Medications:  No current facility-administered medications for this encounter.    Current Outpatient Medications:   •  oxyCODONE immediate-release (ROXICODONE) 5 MG Tab, Take 5 mg by mouth every 3 hours as needed for Severe Pain. Up to 3 days, Disp: , Rfl:   •  NARCAN 4 MG/0.1ML Liquid, Administer 1 Spray into affected nostril(S) as needed. Indications: Opioid Overdose, Disp: , Rfl:   •  spironolactone (ALDACTONE) 25 MG Tab, Take 1 Tablet by mouth  every day., Disp: 30 Tablet, Rfl: 1  •  furosemide (LASIX) 40 MG Tab, Take 1 Tablet by mouth every day., Disp: 30 Tablet, Rfl: 1  •  ursodiol (ACTIGALL) 300 MG Cap, Take 1 Capsule by mouth every day., Disp: 30 Capsule, Rfl: 0  •  Ascorbic Acid (VITAMIN C PO), Take 1 Tablet by mouth every morning., Disp: , Rfl:   •  vitamin D3 (QC VITAMIN D3) 5000 Unit (125 mcg) Tab, Take 5,000 Units by mouth 2 times a day., Disp: , Rfl:   •  pyridoxine (VITAMIN B-6) 50 MG Tab, Take 50 mg by mouth every morning., Disp: , Rfl:   •  insulin aspart (NOVOLOG FLEXPEN) 100 UNIT/ML injection PEN, Inject 1-5 Units under the skin in the morning, at noon, and at bedtime. 1 units for every 5 g of carbs  AND Sliding Scale  150 - 200 = 1 units 201 - 250 = 2 units 251 - 300 = 3 units 301 - 350 = 4 units 351 - 400 = 5 units, Disp: , Rfl:   •  levothyroxine (SYNTHROID) 25 MCG Tab, Take 25 mcg by mouth every morning on an empty stomach. 25mcg tablet + 200mcg tablet for a total dose of 225mcg, Disp: , Rfl:   •  B Complex Vitamins (VITAMIN B COMPLEX PO), Take 1 Tablet by mouth every morning., Disp: , Rfl:   •  Docusate Calcium (STOOL SOFTENER PO), Take 1 Tablet by mouth every evening., Disp: , Rfl:   •  traZODone (DESYREL) 100 MG Tab, Take 100 mg by mouth at bedtime., Disp: , Rfl:   •  pantoprazole (PROTONIX) 40 MG Tablet Delayed Response, Take 40 mg by mouth every morning., Disp: , Rfl:   •  ondansetron (ZOFRAN ODT) 4 MG TABLET DISPERSIBLE, Take 1 Tablet by mouth every 6 hours as needed for Nausea., Disp: 10 Tablet, Rfl: 0  •  TRESIBA FLEXTOUCH 100 UNIT/ML Solution Pen-injector, Inject 23 Units under the skin every evening., Disp: , Rfl:   •  ALPRAZolam (XANAX) 0.5 MG Tab, Take 0.5 mg by mouth 3 times a day as needed for Anxiety., Disp: , Rfl:   •  sertraline (ZOLOFT) 100 MG Tab, Take 100 mg by mouth every evening., Disp: , Rfl:   •  SYNTHROID 200 MCG Tab, Take 200 mcg by mouth every morning. 200mcg tablet + 25mcg tablet for a total dose of 225mcg,  Disp: , Rfl:   •  metoprolol (TOPROL-XL) 200 MG XL tablet, Take 1 tablet by mouth every day., Disp: 90 tablet, Rfl: 3  •  Continuous Blood Gluc Sensor (FREESTYLE PARISA 14 DAY SENSOR) Misc, 1 MISCELLANEOUS E10.42 CHANGE SENSOR EVERY 14 DAYS   E11.65, Disp: , Rfl:   •  aspirin 81 MG EC tablet, Take 1 Tab by mouth every morning., Disp: 90 Tab, Rfl: 3    Current Outpatient Medications:  No current facility-administered medications for this encounter.     Current Outpatient Medications   Medication Sig Dispense Refill   • oxyCODONE immediate-release (ROXICODONE) 5 MG Tab Take 5 mg by mouth every 3 hours as needed for Severe Pain. Up to 3 days     • NARCAN 4 MG/0.1ML Liquid Administer 1 Spray into affected nostril(S) as needed. Indications: Opioid Overdose     • spironolactone (ALDACTONE) 25 MG Tab Take 1 Tablet by mouth every day. 30 Tablet 1   • furosemide (LASIX) 40 MG Tab Take 1 Tablet by mouth every day. 30 Tablet 1   • ursodiol (ACTIGALL) 300 MG Cap Take 1 Capsule by mouth every day. 30 Capsule 0   • Ascorbic Acid (VITAMIN C PO) Take 1 Tablet by mouth every morning.     • vitamin D3 (QC VITAMIN D3) 5000 Unit (125 mcg) Tab Take 5,000 Units by mouth 2 times a day.     • pyridoxine (VITAMIN B-6) 50 MG Tab Take 50 mg by mouth every morning.     • insulin aspart (NOVOLOG FLEXPEN) 100 UNIT/ML injection PEN Inject 1-5 Units under the skin in the morning, at noon, and at bedtime. 1 units for every 5 g of carbs   AND  Sliding Scale   150 - 200 = 1 units  201 - 250 = 2 units  251 - 300 = 3 units  301 - 350 = 4 units  351 - 400 = 5 units     • levothyroxine (SYNTHROID) 25 MCG Tab Take 25 mcg by mouth every morning on an empty stomach. 25mcg tablet + 200mcg tablet for a total dose of 225mcg     • B Complex Vitamins (VITAMIN B COMPLEX PO) Take 1 Tablet by mouth every morning.     • Docusate Calcium (STOOL SOFTENER PO) Take 1 Tablet by mouth every evening.     • traZODone (DESYREL) 100 MG Tab Take 100 mg by mouth at bedtime.      • pantoprazole (PROTONIX) 40 MG Tablet Delayed Response Take 40 mg by mouth every morning.     • ondansetron (ZOFRAN ODT) 4 MG TABLET DISPERSIBLE Take 1 Tablet by mouth every 6 hours as needed for Nausea. 10 Tablet 0   • TRESIBA FLEXTOUCH 100 UNIT/ML Solution Pen-injector Inject 23 Units under the skin every evening.     • ALPRAZolam (XANAX) 0.5 MG Tab Take 0.5 mg by mouth 3 times a day as needed for Anxiety.     • sertraline (ZOLOFT) 100 MG Tab Take 100 mg by mouth every evening.     • SYNTHROID 200 MCG Tab Take 200 mcg by mouth every morning. 200mcg tablet + 25mcg tablet for a total dose of 225mcg     • metoprolol (TOPROL-XL) 200 MG XL tablet Take 1 tablet by mouth every day. 90 tablet 3   • Continuous Blood Gluc Sensor (FREESTYLE PARISA 14 DAY SENSOR) Misc 1 MISCELLANEOUS E10.42 CHANGE SENSOR EVERY 14 DAYS   E11.65     • aspirin 81 MG EC tablet Take 1 Tab by mouth every morning. 90 Tab 3       Medication Allergy:  Allergies   Allergen Reactions   • Tape Unspecified     Blisters, paper tape is ok       Family History:  Family History   Problem Relation Age of Onset   • Hypertension Mother    • Cancer Father        Social History:  Social History     Socioeconomic History   • Marital status:      Spouse name: Not on file   • Number of children: Not on file   • Years of education: Not on file   • Highest education level: Not on file   Occupational History   • Not on file   Tobacco Use   • Smoking status: Current Every Day Smoker     Packs/day: 0.50     Years: 30.00     Pack years: 15.00     Types: Cigarettes   • Smokeless tobacco: Never Used   Vaping Use   • Vaping Use: Never used   Substance and Sexual Activity   • Alcohol use: Not Currently   • Drug use: Yes     Comment: Marijuana   • Sexual activity: Not on file   Other Topics Concern   • Not on file   Social History Narrative   • Not on file     Social Determinants of Health     Financial Resource Strain: Low Risk    • Difficulty of Paying Living  Expenses: Not very hard   Food Insecurity: No Food Insecurity   • Worried About Running Out of Food in the Last Year: Never true   • Ran Out of Food in the Last Year: Never true   Transportation Needs: No Transportation Needs   • Lack of Transportation (Medical): No   • Lack of Transportation (Non-Medical): No   Physical Activity:    • Days of Exercise per Week: Not on file   • Minutes of Exercise per Session: Not on file   Stress:    • Feeling of Stress : Not on file   Social Connections:    • Frequency of Communication with Friends and Family: Not on file   • Frequency of Social Gatherings with Friends and Family: Not on file   • Attends Jainism Services: Not on file   • Active Member of Clubs or Organizations: Not on file   • Attends Club or Organization Meetings: Not on file   • Marital Status: Not on file   Intimate Partner Violence:    • Fear of Current or Ex-Partner: Not on file   • Emotionally Abused: Not on file   • Physically Abused: Not on file   • Sexually Abused: Not on file   Housing Stability:    • Unable to Pay for Housing in the Last Year: Not on file   • Number of Places Lived in the Last Year: Not on file   • Unstable Housing in the Last Year: Not on file         MDM (Data Review):     Records reviewed and summarized in current documentation    Lab Data Review:  Recent Results (from the past 24 hour(s))   CBC WITH DIFFERENTIAL    Collection Time: 11/10/21  5:23 AM   Result Value Ref Range    WBC 11.8 (H) 4.8 - 10.8 K/uL    RBC 2.61 (L) 4.20 - 5.40 M/uL    Hemoglobin 8.5 (L) 12.0 - 16.0 g/dL    Hematocrit 26.8 (L) 37.0 - 47.0 %    .7 (H) 81.4 - 97.8 fL    MCH 32.6 27.0 - 33.0 pg    MCHC 31.7 (L) 33.6 - 35.0 g/dL    RDW 61.0 (H) 35.9 - 50.0 fL    Platelet Count 461 (H) 164 - 446 K/uL    MPV 11.6 9.0 - 12.9 fL    Neutrophils-Polys 77.20 (H) 44.00 - 72.00 %    Lymphocytes 9.10 (L) 22.00 - 41.00 %    Monocytes 8.70 0.00 - 13.40 %    Eosinophils 3.10 0.00 - 6.90 %    Basophils 1.50 0.00 - 1.80  %    Immature Granulocytes 0.40 0.00 - 0.90 %    Nucleated RBC 0.00 /100 WBC    Neutrophils (Absolute) 9.12 (H) 2.00 - 7.15 K/uL    Lymphs (Absolute) 1.08 1.00 - 4.80 K/uL    Monos (Absolute) 1.03 (H) 0.00 - 0.85 K/uL    Eos (Absolute) 0.37 0.00 - 0.51 K/uL    Baso (Absolute) 0.18 (H) 0.00 - 0.12 K/uL    Immature Granulocytes (abs) 0.05 0.00 - 0.11 K/uL    NRBC (Absolute) 0.00 K/uL   COMP METABOLIC PANEL    Collection Time: 11/10/21  5:23 AM   Result Value Ref Range    Sodium 131 (L) 135 - 145 mmol/L    Potassium 4.0 3.6 - 5.5 mmol/L    Chloride 94 (L) 96 - 112 mmol/L    Co2 27 20 - 33 mmol/L    Anion Gap 10.0 7.0 - 16.0    Glucose 290 (H) 65 - 99 mg/dL    Bun 17 8 - 22 mg/dL    Creatinine 0.83 0.50 - 1.40 mg/dL    Calcium 8.2 (L) 8.4 - 10.2 mg/dL    AST(SGOT) 171 (H) 12 - 45 U/L    ALT(SGPT) 95 (H) 2 - 50 U/L    Alkaline Phosphatase 2398 (H) 30 - 99 U/L    Total Bilirubin 7.1 (H) 0.1 - 1.5 mg/dL    Albumin 2.4 (L) 3.2 - 4.9 g/dL    Total Protein 6.2 6.0 - 8.2 g/dL    Globulin 3.8 (H) 1.9 - 3.5 g/dL    A-G Ratio 0.6 g/dL   LIPASE    Collection Time: 11/10/21  5:23 AM   Result Value Ref Range    Lipase 25 7 - 58 U/L   ESTIMATED GFR    Collection Time: 11/10/21  5:23 AM   Result Value Ref Range    GFR If African American >60 >60 mL/min/1.73 m 2    GFR If Non African American >60 >60 mL/min/1.73 m 2       Imaging/Procedures Review:      NM-HEPATOBILIARY SCAN    (Results Pending)        MDM (Assessment and Plan):     Patient Active Problem List    Diagnosis Date Noted   • Anasarca 10/29/2021   • Chronic pain 10/26/2021   • Cholecystitis 10/25/2021   • Dehydration 10/21/2021   • Bilious vomiting with nausea 10/21/2021   • Hepatitis 10/21/2021   • Jaundice 10/20/2021   • Current moderate episode of major depressive disorder (HCC) 10/17/2021   • Generalized abdominal pain 10/08/2021   • Epigastric pain 09/28/2021   • Elevated liver enzymes 09/23/2021   • Anemia 09/23/2021   • Hyponatremia 09/23/2021   • Elevated troponin  09/23/2021   • Pain in the chest 08/17/2020   • CAD S/P percutaneous coronary angioplasty 08/11/2020   • Hypothyroidism in adult 08/10/2020   • CKD (chronic kidney disease) stage 3, GFR 30-59 ml/min ([facility]) 06/23/2020   • Dyslipidemia 06/23/2020   • GERD (gastroesophageal reflux disease) 06/23/2020   • Lung nodule 06/23/2020   • Hemoptysis 06/23/2020   • Anxiety 01/25/2020   • Nausea & vomiting 01/25/2020   • Hypertension 01/24/2020   • Hypoglycemia 01/22/2020   • RHEA (acute kidney injury) ([facility]) 01/21/2020   • Post-operative wound abscess 01/18/2020   • Tobacco abuse 01/18/2020   • Cellulitis 03/15/2018   • Left hip pain 03/15/2018   • Acute left lumbar radiculopathy 08/31/2016   • Lumbar spinal stenosis 05/10/2016   • Type 1 diabetes mellitus with neurological manifestations, uncontrolled ([facility]) 06/10/2015   • Diabetic polyneuropathy (CMS-[facility]) 06/10/2015   • Vertigo 04/08/2015   • Uncontrolled type 1 diabetes mellitus ([facility]) 09/25/2013   • Encounter for long-term (current) use of insulin ([facility]) 09/25/2013   • Accidental drug overdose 08/27/2012   • Vitamin d deficiency 03/14/2012   • Hypothyroidism, postsurgical    • Cigarette smoker      This is a 66-year-old female, admitted to the hospital with persistent intermittent episode of epigastric/periumbilical pain radiating to mid back.  She has been experiencing jaundice for the past 6 weeks.  Recently, diagnosed with liver disease, unknown etiology.  She had a liver biopsy done-pending interpretation by [place].  She had an ERCP with biliary stent placement 10/24/2021 followed by a laparoscopic cholecystectomy 10/28/2021.  Since then, she has had intermittent episodes of severe upper abdominal pain radiating to mid back, nausea/vomiting.  Labs: .  ALT 95.  Alkaline phosphatase 2398.  Total bilirubin 7.1.  CT scan abdomen/pelvis: Fluid and air within the gallbladder fossa concerning for abscess given timing since prior surgery.    ASSESSMENT:  1.   Epigastric/periumbilical pain radiating to mid back-concern for bile leak versus abscess  2.  Intermittent nausea/vomiting  3.  Liver disease-unknown etiology likely MILLER, she had an evaluation at [facility].  Status post liver biopsy-pending interpretation from [place].  4.  Elevated LFTs-total bilirubin has trended down since 10/21/2021.  Current bilirubin 7.1.  .  ALT 95.  Alkaline phosphatase 2398.  5.  Persistent jaundice  6.  Type 1 diabetes    Plan:  1.  Dr. Soto recommended HIDA scan however, her bilirubin is too high  2.  Recommend surgical consultation concern for abscess versus biliary leak  3.  IV fluids, antiemetics, pain medication per primary team  4.  N.p.o.  5. MRCP for biliary stones, obstruction, leak        Thank your for the opportunity to assist in the care of your patient.  Please call for any questions or concerns.    WILLIAM Galarza.

## 2021-11-10 NOTE — ED TRIAGE NOTES
67 y/o female BIB remsa from [facility]   Chief Complaint   Patient presents with   • Abdominal Pain     started 10pm last night   • Back Pain     Pt was sent here for HIDA

## 2021-11-10 NOTE — ED NOTES
Medicinal interventions carried out per ERP orders.     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     Call light within reach. Bed in lowest position.

## 2021-11-11 ENCOUNTER — APPOINTMENT (OUTPATIENT)
Dept: RADIOLOGY | Facility: MEDICAL CENTER | Age: 66
End: 2021-11-11
Attending: NURSE PRACTITIONER
Payer: MEDICARE

## 2021-11-11 VITALS
OXYGEN SATURATION: 95 % | DIASTOLIC BLOOD PRESSURE: 76 MMHG | WEIGHT: 135.58 LBS | TEMPERATURE: 98 F | HEART RATE: 78 BPM | RESPIRATION RATE: 18 BRPM | BODY MASS INDEX: 21.79 KG/M2 | HEIGHT: 66 IN | SYSTOLIC BLOOD PRESSURE: 149 MMHG

## 2021-11-11 LAB
ALBUMIN SERPL BCP-MCNC: 2.5 G/DL (ref 3.2–4.9)
ALBUMIN/GLOB SERPL: 0.6 G/DL
ALP SERPL-CCNC: 2176 U/L (ref 30–99)
ALT SERPL-CCNC: 99 U/L (ref 2–50)
ANION GAP SERPL CALC-SCNC: 12 MMOL/L (ref 7–16)
AST SERPL-CCNC: 119 U/L (ref 12–45)
BASOPHILS # BLD AUTO: 1.6 % (ref 0–1.8)
BASOPHILS # BLD: 0.19 K/UL (ref 0–0.12)
BILIRUB SERPL-MCNC: 7.1 MG/DL (ref 0.1–1.5)
BUN SERPL-MCNC: 17 MG/DL (ref 8–22)
CALCIUM SERPL-MCNC: 8.4 MG/DL (ref 8.5–10.5)
CHLORIDE SERPL-SCNC: 93 MMOL/L (ref 96–112)
CO2 SERPL-SCNC: 25 MMOL/L (ref 20–33)
CREAT SERPL-MCNC: 0.98 MG/DL (ref 0.5–1.4)
EKG IMPRESSION: NORMAL
EOSINOPHIL # BLD AUTO: 0.32 K/UL (ref 0–0.51)
EOSINOPHIL NFR BLD: 2.8 % (ref 0–6.9)
ERYTHROCYTE [DISTWIDTH] IN BLOOD BY AUTOMATED COUNT: 59.9 FL (ref 35.9–50)
GLOBULIN SER CALC-MCNC: 4 G/DL (ref 1.9–3.5)
GLUCOSE BLD-MCNC: 183 MG/DL (ref 65–99)
GLUCOSE BLD-MCNC: 236 MG/DL (ref 65–99)
GLUCOSE BLD-MCNC: 252 MG/DL (ref 65–99)
GLUCOSE SERPL-MCNC: 260 MG/DL (ref 65–99)
HCT VFR BLD AUTO: 27.9 % (ref 37–47)
HGB BLD-MCNC: 8.5 G/DL (ref 12–16)
IMM GRANULOCYTES # BLD AUTO: 0.04 K/UL (ref 0–0.11)
IMM GRANULOCYTES NFR BLD AUTO: 0.3 % (ref 0–0.9)
LYMPHOCYTES # BLD AUTO: 1.21 K/UL (ref 1–4.8)
LYMPHOCYTES NFR BLD: 10.4 % (ref 22–41)
MCH RBC QN AUTO: 31.4 PG (ref 27–33)
MCHC RBC AUTO-ENTMCNC: 30.5 G/DL (ref 33.6–35)
MCV RBC AUTO: 103 FL (ref 81.4–97.8)
MONOCYTES # BLD AUTO: 1.15 K/UL (ref 0–0.85)
MONOCYTES NFR BLD AUTO: 9.9 % (ref 0–13.4)
NEUTROPHILS # BLD AUTO: 8.69 K/UL (ref 2–7.15)
NEUTROPHILS NFR BLD: 75 % (ref 44–72)
NRBC # BLD AUTO: 0 K/UL
NRBC BLD-RTO: 0 /100 WBC
PLATELET # BLD AUTO: 478 K/UL (ref 164–446)
PMV BLD AUTO: 10.9 FL (ref 9–12.9)
POTASSIUM SERPL-SCNC: 3.3 MMOL/L (ref 3.6–5.5)
PROT SERPL-MCNC: 6.5 G/DL (ref 6–8.2)
RBC # BLD AUTO: 2.71 M/UL (ref 4.2–5.4)
SODIUM SERPL-SCNC: 130 MMOL/L (ref 135–145)
WBC # BLD AUTO: 11.6 K/UL (ref 4.8–10.8)

## 2021-11-11 PROCEDURE — A9270 NON-COVERED ITEM OR SERVICE: HCPCS | Performed by: STUDENT IN AN ORGANIZED HEALTH CARE EDUCATION/TRAINING PROGRAM

## 2021-11-11 PROCEDURE — 99024 POSTOP FOLLOW-UP VISIT: CPT | Performed by: SURGERY

## 2021-11-11 PROCEDURE — 93005 ELECTROCARDIOGRAM TRACING: CPT | Performed by: STUDENT IN AN ORGANIZED HEALTH CARE EDUCATION/TRAINING PROGRAM

## 2021-11-11 PROCEDURE — 700102 HCHG RX REV CODE 250 W/ 637 OVERRIDE(OP): Performed by: STUDENT IN AN ORGANIZED HEALTH CARE EDUCATION/TRAINING PROGRAM

## 2021-11-11 PROCEDURE — 96376 TX/PRO/DX INJ SAME DRUG ADON: CPT

## 2021-11-11 PROCEDURE — 99217 PR OBSERVATION CARE DISCHARGE: CPT | Performed by: STUDENT IN AN ORGANIZED HEALTH CARE EDUCATION/TRAINING PROGRAM

## 2021-11-11 PROCEDURE — 96372 THER/PROPH/DIAG INJ SC/IM: CPT

## 2021-11-11 PROCEDURE — 80053 COMPREHEN METABOLIC PANEL: CPT

## 2021-11-11 PROCEDURE — 96375 TX/PRO/DX INJ NEW DRUG ADDON: CPT

## 2021-11-11 PROCEDURE — 93010 ELECTROCARDIOGRAM REPORT: CPT | Performed by: INTERNAL MEDICINE

## 2021-11-11 PROCEDURE — 700111 HCHG RX REV CODE 636 W/ 250 OVERRIDE (IP): Performed by: STUDENT IN AN ORGANIZED HEALTH CARE EDUCATION/TRAINING PROGRAM

## 2021-11-11 PROCEDURE — 700102 HCHG RX REV CODE 250 W/ 637 OVERRIDE(OP): Performed by: SURGERY

## 2021-11-11 PROCEDURE — 82962 GLUCOSE BLOOD TEST: CPT

## 2021-11-11 PROCEDURE — 74181 MRI ABDOMEN W/O CONTRAST: CPT | Mod: MG

## 2021-11-11 PROCEDURE — G0378 HOSPITAL OBSERVATION PER HR: HCPCS

## 2021-11-11 PROCEDURE — A9270 NON-COVERED ITEM OR SERVICE: HCPCS | Performed by: SURGERY

## 2021-11-11 PROCEDURE — 85025 COMPLETE CBC W/AUTO DIFF WBC: CPT

## 2021-11-11 RX ORDER — LORAZEPAM 2 MG/ML
2 INJECTION INTRAMUSCULAR
Status: DISCONTINUED | OUTPATIENT
Start: 2021-11-11 | End: 2021-11-12 | Stop reason: HOSPADM

## 2021-11-11 RX ORDER — OXYCODONE HYDROCHLORIDE 5 MG/1
5 TABLET ORAL EVERY 6 HOURS PRN
Qty: 30 TABLET | Refills: 0 | Status: SHIPPED | OUTPATIENT
Start: 2021-11-11 | End: 2021-11-18

## 2021-11-11 RX ORDER — AMOXICILLIN AND CLAVULANATE POTASSIUM 875; 125 MG/1; MG/1
1 TABLET, FILM COATED ORAL EVERY 12 HOURS
Qty: 10 TABLET | Refills: 0 | Status: SHIPPED | OUTPATIENT
Start: 2021-11-11 | End: 2021-11-16

## 2021-11-11 RX ORDER — POTASSIUM CHLORIDE 20 MEQ/1
40 TABLET, EXTENDED RELEASE ORAL 3 TIMES DAILY
Status: COMPLETED | OUTPATIENT
Start: 2021-11-11 | End: 2021-11-11

## 2021-11-11 RX ORDER — HALOPERIDOL 5 MG/ML
1 INJECTION INTRAMUSCULAR EVERY 4 HOURS PRN
Status: DISCONTINUED | OUTPATIENT
Start: 2021-11-11 | End: 2021-11-12 | Stop reason: HOSPADM

## 2021-11-11 RX ADMIN — INSULIN HUMAN 2 UNITS: 100 INJECTION, SOLUTION PARENTERAL at 06:35

## 2021-11-11 RX ADMIN — AMOXICILLIN AND CLAVULANATE POTASSIUM 1 TABLET: 875; 125 TABLET, FILM COATED ORAL at 17:40

## 2021-11-11 RX ADMIN — LORAZEPAM 2 MG: 2 INJECTION INTRAMUSCULAR; INTRAVENOUS at 16:28

## 2021-11-11 RX ADMIN — POTASSIUM CHLORIDE 40 MEQ: 1500 TABLET, EXTENDED RELEASE ORAL at 08:24

## 2021-11-11 RX ADMIN — SERTRALINE HYDROCHLORIDE 100 MG: 100 TABLET ORAL at 17:40

## 2021-11-11 RX ADMIN — OXYCODONE HYDROCHLORIDE 10 MG: 10 TABLET ORAL at 10:42

## 2021-11-11 RX ADMIN — SPIRONOLACTONE 25 MG: 25 TABLET ORAL at 06:25

## 2021-11-11 RX ADMIN — OXYCODONE HYDROCHLORIDE 10 MG: 10 TABLET ORAL at 13:55

## 2021-11-11 RX ADMIN — OXYCODONE HYDROCHLORIDE 10 MG: 10 TABLET ORAL at 00:02

## 2021-11-11 RX ADMIN — FUROSEMIDE 40 MG: 40 TABLET ORAL at 06:23

## 2021-11-11 RX ADMIN — LORAZEPAM 2 MG: 2 INJECTION INTRAMUSCULAR; INTRAVENOUS at 11:18

## 2021-11-11 RX ADMIN — TRAZODONE HYDROCHLORIDE 100 MG: 50 TABLET ORAL at 21:33

## 2021-11-11 RX ADMIN — ONDANSETRON 4 MG: 2 INJECTION INTRAMUSCULAR; INTRAVENOUS at 00:37

## 2021-11-11 RX ADMIN — INSULIN HUMAN 1 UNITS: 100 INJECTION, SOLUTION PARENTERAL at 11:24

## 2021-11-11 RX ADMIN — URSODIOL 300 MG: 300 CAPSULE ORAL at 06:27

## 2021-11-11 RX ADMIN — LEVOTHYROXINE SODIUM 225 MCG: 0.2 TABLET ORAL at 06:24

## 2021-11-11 RX ADMIN — LORAZEPAM 2 MG: 2 INJECTION INTRAMUSCULAR; INTRAVENOUS at 19:38

## 2021-11-11 RX ADMIN — INSULIN GLARGINE 15 UNITS: 100 INJECTION, SOLUTION SUBCUTANEOUS at 17:43

## 2021-11-11 RX ADMIN — OMEPRAZOLE 20 MG: 20 CAPSULE, DELAYED RELEASE ORAL at 06:24

## 2021-11-11 RX ADMIN — INSULIN HUMAN 3 UNITS: 100 INJECTION, SOLUTION PARENTERAL at 17:43

## 2021-11-11 RX ADMIN — AMOXICILLIN AND CLAVULANATE POTASSIUM 1 TABLET: 875; 125 TABLET, FILM COATED ORAL at 06:28

## 2021-11-11 RX ADMIN — LORAZEPAM 2 MG: 2 INJECTION INTRAMUSCULAR; INTRAVENOUS at 06:23

## 2021-11-11 RX ADMIN — ENALAPRILAT 1.25 MG: 1.25 INJECTION INTRAVENOUS at 17:50

## 2021-11-11 RX ADMIN — POTASSIUM CHLORIDE 40 MEQ: 1500 TABLET, EXTENDED RELEASE ORAL at 11:18

## 2021-11-11 RX ADMIN — HALOPERIDOL LACTATE 1 MG: 5 INJECTION, SOLUTION INTRAMUSCULAR at 19:52

## 2021-11-11 ASSESSMENT — PAIN DESCRIPTION - PAIN TYPE
TYPE: ACUTE PAIN

## 2021-11-11 ASSESSMENT — ENCOUNTER SYMPTOMS
RESPIRATORY NEGATIVE: 1
ABDOMINAL PAIN: 0
FEVER: 0
WEAKNESS: 0
PSYCHIATRIC NEGATIVE: 1
VOMITING: 0
CHILLS: 0
NAUSEA: 0
EYES NEGATIVE: 1
CARDIOVASCULAR NEGATIVE: 1
BACK PAIN: 1

## 2021-11-11 NOTE — CARE PLAN
The patient is Watcher - Medium risk of patient condition declining or worsening         Progress made toward(s) clinical / shift goals:  Pt pain controlled with interventions.    Patient is not progressing towards the following goals:Pt increasingly confused with shift. Poor safety awareness.

## 2021-11-11 NOTE — DISCHARGE SUMMARY
Discharge Summary    CHIEF COMPLAINT ON ADMISSION  Chief Complaint   Patient presents with   • Abdominal Pain     started 10pm last night   • Back Pain       Reason for Admission  RUQ pain, abnormal liver enzymes     Admission Date  11/10/2021    CODE STATUS  DNAR/DNI    HPI & HOSPITAL COURSE  Anu Manuel is a 66 y.o. F with chronic liver disease (unclear etiology; cholestatic pattern), DM1 with neuropathy, CAD (s/p 2 stents), hypothyroidism, HTN  who presented 11/10/2021 with c/o acute worsening RUQ abdominal pain radiating to back; associated nausea and vomiting. Pt initially presented [facility] ED - difficult to control pain. Workup at [facility]: her total bilirubin, alk phos, transaminases are markedly elevated although are at baseline for her, white blood cell count 11, and CT with fluid and air in the gallbladder fossa that was stable since prior study. CT findings discussed with GI Dr. Soto who recommended HIDA. Pt was then transferred to [facility]. At [facility], HIDA scan cannot be initially as Pt's received opioids earlier (plus concern about study's validity in the setting of hyperbilirubin). ERP Dr. Champion discussed the case with GI on call Dr. Blanchard - unclear what other studies or interventions may benefit at this point. Extensive care plan discussed with Pt's daughter (POA), she reported she is interested in making her mother more comfortable; potential for hospice and not sure they want to pursue further diagnostic studies. Medical service was then referred for pain management and further facilitate goals of care.     Per history, Pt has been worked up for chronic liver dysfunction (?idopathc; ?autoimmune) since 8/2021; seen by HERNANDO Traore and [facility]. Pt also had a recent admission at [facility] from 10/25-11/2 where she underwent ERCP for cholangitis and choledocholithiasis - sphincterotomy and biliary stent placed. Surgery team also performed lap cholecystectomy before DC (initially required ALBINO drain - negative  cultures). Since DC, Pt reportedly continued to have abd pain though mostly manageable. She has visited [facility] ED on 11/7 - CT abd at that time did not show obvious abscess, infection or fluid leak and DC home.     [facility] core biopsy result 10/8/2021 reviewed: differential included mechanical large bile duct obstruction, as well as primary sclerosing cholangitis, AMA negative primary biliary cholangitis and drug-induced cholestatic injury    On admission, I discussed care plan with daughter Farzaneh after her discussion with ERP - she was in agreement with a referral for Hospice. Her goal was to make Pt comfortable and be able to make the planned trip. We did discuss about questionable abscess and GI's recommendation for surgery evaluation which daughter agrees.    General surgery consult appreciated - recommended starting 5 days course of Abx.      Overnight 11/10-11/11, HIDA scan done no evidence of biliary leak; CBD patent. Pt appeared to have delirium overnight. However, during AM rounds, Pt was quite lucid and able to hold a decent conversation. Quite tearful at times but agree with the referral for hospice. Making a trip with family is important to her and the family. Pain adequately controlled and tolerated IP diet.     Care plan discussed with GI, can be DC with outpatient follow up with GI consultants, HERNANDO Traore or [facility]. MRCP did not show any obstruction (Biliary tree is normal in caliber and there is no choledocholithiasis. Gallbladder is surgically absent with heterogeneous fluid and gas in the gallbladder fossa with differential diagnosis of abscess versus hematoma).    Pt was accepted to [facility] but admission to hospice pending after the trip.     With relative medical stability, a plan to DC discussed.       Therefore, she is discharged in guarded and stable condition to home with close outpatient follow-up.    The patient recovered much more quickly than anticipated on  admission.    Discharge Date  11/11/21    FOLLOW UP ITEMS POST DISCHARGE  Follow up with Hospice referral     DISCHARGE DIAGNOSES  Principal Problem:    Right upper quadrant pain POA: Yes  Active Problems:    Advanced care planning/counseling discussion POA: Yes    Hypothyroidism, postsurgical POA: Yes    Type 1 diabetes mellitus with neurological manifestations, uncontrolled (HCC) POA: Yes      Overview: ICD-10 transition    RHEA (acute kidney injury) (HCC) POA: Yes    GERD (gastroesophageal reflux disease) POA: Yes    Elevated liver enzymes POA: Yes    Jaundice POA: Yes    Anasarca POA: Yes    Fluid collection at surgical site POA: Yes  Resolved Problems:    * No resolved hospital problems. *      FOLLOW UP  Please follow up with your primary care physician within 1 to 2 weeks of discharge. Tele health if available.    MEDICATIONS ON DISCHARGE     Medication List      START taking these medications      Instructions   amoxicillin-clavulanate 875-125 MG Tabs  Commonly known as: AUGMENTIN   Take 1 Tablet by mouth every 12 hours for 5 days.  Dose: 1 Tablet        CHANGE how you take these medications      Instructions   oxyCODONE immediate-release 5 MG Tabs  What changed:   · when to take this  · additional instructions  Commonly known as: ROXICODONE   Take 1 Tablet by mouth every 6 hours as needed for Severe Pain for up to 7 days.  Dose: 5 mg        CONTINUE taking these medications      Instructions   ALPRAZolam 0.5 MG Tabs  Commonly known as: XANAX   Take 0.5 mg by mouth 3 times a day as needed for Anxiety.  Dose: 0.5 mg     aspirin 81 MG EC tablet   Take 1 Tab by mouth every morning.  Dose: 81 mg     FreeStyle John 14 Day Sensor Misc   1 MISCELLANEOUS E10.42 CHANGE SENSOR EVERY 14 DAYS   E11.65     furosemide 40 MG Tabs  Commonly known as: LASIX   Take 1 Tablet by mouth every day.  Dose: 40 mg     metoprolol 200 MG XL tablet  Commonly known as: TOPROL-XL   Take 1 tablet by mouth every day.  Dose: 200 mg      Narcan 4 MG/0.1ML Liqd  Generic drug: Naloxone   Administer 1 Spray into affected nostril(S) as needed. Indications: Opioid Overdose  Dose: 1 Spray     NovoLOG FlexPen 100 UNIT/ML injection PEN  Generic drug: insulin aspart   Inject 1-5 Units under the skin in the morning, at noon, and at bedtime. 1 units for every 5 g of carbs   AND  Sliding Scale   150 - 200 = 1 units  201 - 250 = 2 units  251 - 300 = 3 units  301 - 350 = 4 units  351 - 400 = 5 units  Dose: 1-5 Units     ondansetron 4 MG Tbdp  Commonly known as: Zofran ODT   Take 1 Tablet by mouth every 6 hours as needed for Nausea.  Dose: 4 mg     pantoprazole 40 MG Tbec  Commonly known as: PROTONIX   Take 40 mg by mouth every morning.  Dose: 40 mg     pyridoxine 50 MG Tabs  Commonly known as: VITAMIN B-6   Take 50 mg by mouth every morning.  Dose: 50 mg     QC Vitamin D3 5000 Unit (125 mcg) Tabs  Generic drug: vitamin D3   Take 5,000 Units by mouth 2 times a day.  Dose: 5,000 Units     sertraline 100 MG Tabs  Commonly known as: Zoloft   Take 100 mg by mouth every evening.  Dose: 100 mg     spironolactone 25 MG Tabs  Commonly known as: ALDACTONE   Take 1 Tablet by mouth every day.  Dose: 25 mg     STOOL SOFTENER PO   Take 1 Tablet by mouth every evening.  Dose: 1 Tablet     * levothyroxine 25 MCG Tabs  Commonly known as: SYNTHROID   Take 25 mcg by mouth every morning on an empty stomach. 25mcg tablet + 200mcg tablet for a total dose of 225mcg  Dose: 25 mcg     * Synthroid 200 MCG Tabs  Generic drug: levothyroxine   Take 200 mcg by mouth every morning. 200mcg tablet + 25mcg tablet for a total dose of 225mcg  Dose: 200 mcg     traZODone 100 MG Tabs  Commonly known as: DESYREL   Take 100 mg by mouth at bedtime.  Dose: 100 mg     Tresiba FlexTouch 100 UNIT/ML Sopn  Generic drug: Insulin Degludec   Inject 23 Units under the skin every evening.  Dose: 23 Units     ursodiol 300 MG Caps  Commonly known as: ACTIGALL   Take 1 Capsule by mouth every day.  Dose: 300 mg      VITAMIN B COMPLEX PO   Take 1 Tablet by mouth every morning.  Dose: 1 Tablet     VITAMIN C PO   Take 1 Tablet by mouth every morning.  Dose: 1 Tablet         * This list has 2 medication(s) that are the same as other medications prescribed for you. Read the directions carefully, and ask your doctor or other care provider to review them with you.                Allergies  Allergies   Allergen Reactions   • Tape Unspecified     Blisters, paper tape is ok       DIET  Orders Placed This Encounter   Procedures   • Diet Order Diet: Regular     Standing Status:   Standing     Number of Occurrences:   1     Order Specific Question:   Diet:     Answer:   Regular [1]       ACTIVITY  As tolerated.  Weight bearing as tolerated    CONSULTATIONS  GI    PROCEDURES  N/A    LABORATORY  Lab Results   Component Value Date    SODIUM 130 (L) 11/11/2021    POTASSIUM 3.3 (L) 11/11/2021    CHLORIDE 93 (L) 11/11/2021    CO2 25 11/11/2021    GLUCOSE 260 (H) 11/11/2021    BUN 17 11/11/2021    CREATININE 0.98 11/11/2021    CREATININE 1.0 01/08/2009        Lab Results   Component Value Date    WBC 11.6 (H) 11/11/2021    HEMOGLOBIN 8.5 (L) 11/11/2021    HEMATOCRIT 27.9 (L) 11/11/2021    PLATELETCT 478 (H) 11/11/2021        Total time of the discharge process exceeds 36 minutes.

## 2021-11-11 NOTE — PROGRESS NOTES
Pt restless. Yelling from room, urinating in doorway and room floor. Attempted reorientation, pt remained agitated. Medicated per MAR for agitation

## 2021-11-11 NOTE — ASSESSMENT & PLAN NOTE
Acute on chronic with unclear etiology   Extensive workups being done while still continued to be in severe pain with poor quality of life   Liver enzymes are elevated but appeared baseline for her  Goals being discussed    GI also further guiding care plan  Pain management and supportive care meanwhile

## 2021-11-11 NOTE — CONSULTS
Gastroenterology Progress Note:    Pieter thompson MD.  Date & Time note created:    11/11/2021   8:50 AM     Referring MD:  Dr. Minesh Champion    Patient ID:  Name:             Anu Manuel   YOB: 1955  Age:                 66 y.o.  female   MRN:               [id number]                                                             Reason for Consult:      Back pain  History cholecystectomy status post ERCP with stent placement    History of Present Illness:    This is a 66-year-old female, PMH liver disease, unknown etiology, type 1 diabetes, GERD, hyperlipidemia, hypertension, hypothyroidism, polyneuropathy who presented to the emergency room 11/10/2021 with right upper quadrant pain radiating to mid back.  Labs 11/10/2021: WBC 11.8.  Hemoglobin 8.5.  Hematocrit 26.8.  Sodium 131.  Potassium 4.0.  Glucose 290.  BUN 17.  Creatinine 0.83.  Total bilirubin 7.1.  .  ALT 95.  Alkaline phosphatase 2398.  Lipase 25.    CT scan abdomen/pelvis 11/10/2021: Fluid and air within the gallbladder fossa, concerning for abscess given timing since prior surgery.  Stable since prior study.       Approximately 6 weeks ago, she became jaundiced. Diagnosed with liver disease--underwent a liver biopsy, pending interpretation from [place].  Since then, she has had severe right upper quadrant pain that would wax and wane in severity radiating to mid back.  ERCP 10/26/2021 for cholangitis, choledocholithiasis -findings: Normal-appearing bile duct status post ERCP sphincterotomy with cytology brushings and placement of a prophylactic 10 [place] by 5 cm plastic biliary stent, J-shaped stomach.  Cytology brushings-no malignancy.  She had a laparoscopic cholecystectomy 10/28/2021 with findings of chronic cholecystitis.    Since the laparoscopic cholecystectomy and ERCP, patient has been seen in the emergency room on 2-3 occasions with severe epigastric/right upper quadrant radiating to mid back pain.  Evaluated  "in the emergency room 11/7/2021 for the symptoms.  WBC: 13.6.  Hemoglobin 8.7.  Hematocrit 28.1.  Platelet count 480.  Total bilirubin 7.6.  .  .  Alkaline phosphatase 2578.    CT scan abdomen/pelvis 11/7/21: Interval cholecystectomy with gas at the gallbladder fossa which is atypical 2 weeks postprocedure and worrisome for infection but no abscess is detected. New small perihepatic and left anterior pararenal space small ascites without abscess seen. This could be secondary to pancreatitis. Biliary leak is possible but no fluid collecting in the right paracolic gutter is seen to support this conclusion. Common   bile duct stent is present. Consider HIDA scan to evaluate for leak.  She was discharged home-told to follow-up with her general surgeon.      According to her daughter, who is her POA, patient continued to experience epigastric/right upper quadrant pain but now the pain is more in her mid/upper back.  They have not received the results of the liver biopsy.  Her daughter was tearful stating \"I just want to take my mom to [facility] then have her go on hospice.\"  Apparently, there is a trip planned for this Friday.    11/11/21 - per nursing notes had a rough night with sundowning but today \"feels great!\" Denies abd pain. Does have some residual back pain but is mild. Eating breakfast without difficulties.    Review of Systems:      Review of Systems   Constitutional: Positive for malaise/fatigue. Negative for chills and fever.   HENT: Negative.    Eyes: Negative.    Respiratory: Negative.    Cardiovascular: Negative.    Gastrointestinal: Negative for abdominal pain, nausea and vomiting.   Genitourinary: Negative.    Musculoskeletal: Positive for back pain.   Skin: Negative.    Neurological: Negative for weakness.   Psychiatric/Behavioral: Negative.              Physical Exam:  Vitals/ General Appearance:   Weight/BMI: Body mass index is 21.88 kg/m².    Vitals:    11/10/21 1600 11/10/21 1625 " "11/10/21 2032 11/11/21 0600   BP: 136/58 (!) 164/78 (!) 164/73 124/91   Pulse: 62 65  69   Resp:  20 18 20   Temp:  36.7 °C (98 °F) 36.4 °C (97.5 °F) 36.7 °C (98.1 °F)   TempSrc:  Temporal Temporal Tympanic   SpO2: 100% 100%  97%   Weight:  61.5 kg (135 lb 9.3 oz)     Height:  1.676 m (5' 6\")       Oxygen Therapy:  Pulse Oximetry: 97 %, O2 (LPM): 0, O2 Delivery Device: None - Room Air    Physical Exam  Vitals and nursing note reviewed.   Constitutional:       General: She is not in acute distress.     Appearance: Normal appearance. She is not ill-appearing.   HENT:      Head: Normocephalic and atraumatic.      Mouth/Throat:      Mouth: Mucous membranes are dry.   Eyes:      General: Scleral icterus present.      Extraocular Movements: Extraocular movements intact.   Cardiovascular:      Rate and Rhythm: Normal rate and regular rhythm.      Heart sounds: Normal heart sounds.   Pulmonary:      Effort: Pulmonary effort is normal.      Breath sounds: No rhonchi.   Abdominal:      General: Bowel sounds are normal.      Palpations: Abdomen is soft.      Tenderness: There is abdominal tenderness. There is no guarding.   Musculoskeletal:      Right lower leg: Right lower leg edema: trace.      Left lower leg: Left lower leg edema: trace.   Skin:     General: Skin is warm and dry.      Capillary Refill: Capillary refill takes less than 2 seconds.      Coloration: Skin is jaundiced.   Neurological:      Mental Status: She is alert and oriented to person, place, and time.         Past Medical History:   Past Medical History:   Diagnosis Date   • Adverse effect of anesthesia     in 2008 \"throat closes up\"\"anxiety\" ?laryngospasm, kept in ICU. Pt states no problems currently 2018.    • Anesthesia     in 2008 \"throat closes up\"\"anxiety\" ?laryngospasm, kept in ICU. Pt states no problems currently 2018.    • Arthritis     right shoulder, hands   • Cigarette smoker quit 2013   • Dental disorder     missing teeth    • depression " "8/30/2016    denies depression, states has anxiety and panic attacks   • Diabetes mellitus type 1 (HCC) 1989    insulin   • Encounter for long-term (current) use of insulin (HCC) 9/25/2013   • Heart burn    • High cholesterol    • Hypertension    • Hypothyroidism, postsurgical 1970   • Indigestion    • Infectious disease      had hepatitis C, tested neg.   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.63) 6/10/2015       Past Surgical History:  Past Surgical History:   Procedure Laterality Date   • THADDEUS BY LAPAROSCOPY N/A 10/28/2021    Procedure: CHOLECYSTECTOMY, LAPAROSCOPIC;  Surgeon: Tello Trammell M.D.;  Location: [facility];  Service: General   • PB ERCP,DIAGNOSTIC N/A 10/26/2021    Procedure: ERCP (ENDOSCOPIC RETROGRADE CHOLANGIOPANCREATOGRAPHY);  Surgeon: Cr Haro M.D.;  Location: SURGERY SAME DAY [facility];  Service: Gastroenterology   • PB UPPER GI ENDOSCOPY,BIOPSY N/A 10/26/2021    Procedure: GASTROSCOPY, WITH BIOPSY;  Surgeon: Cr Haro M.D.;  Location: SURGERY SAME DAY [facility];  Service: Gastroenterology   • PB UPPER GI ENDOSCOPY,DIAGNOSIS N/A 10/26/2021    Procedure: GASTROSCOPY;  Surgeon: Cr Haro M.D.;  Location: SURGERY SAME DAY [facility];  Service: Gastroenterology   • CATH PLACEMENT  1/25/2020    Procedure: INSERTION, CATHETER PERM;  Surgeon: Rola Mendoza M.D.;  Location: [facility];  Service: General   • IRRIGATION & DEBRIDEMENT NEURO  1/19/2020    Procedure: IRRIGATION AND DEBRIDEMENT, WOUND THORACIC AND LUMBAR;  Surgeon: Ryan Roman M.D.;  Location: [facility];  Service: Neurosurgery   • PB IMPLANT NEUROSTIM/ N/A 12/16/2019    Procedure: EXPLORATION AT THORACIC 8 - 9, REPLACEMENT OF  NEUROSTIMULATOR LEAD;  Surgeon: " Ryan Roman M.D.;  Location: SURGERY [facility];  Service: Neurosurgery   • SPINAL CORD STIMULATOR N/A 10/26/2018    Procedure: SPINAL CORD STIMULATOR;  Surgeon: Ryan Roman M.D.;  Location: SURGERY [facility];  Service: Neurosurgery   • THORACIC LAMINECTOMY N/A 10/26/2018    Procedure: THORACIC LAMINECTOMY - FOR;  Surgeon: Ryan Roman M.D.;  Location: SURGERY [facility];  Service: Neurosurgery   • CO NJX AA&/STRD TFRML EPI LUMBAR/SACRAL 1 LEVEL Right 8/31/2016    Procedure: INJ-FORAMEN EPI LUM/SAC SNGL L4-5;  Surgeon: Sukhi Godfrey M.D.;  [place];  Service: Pain Management   • LUMBAR LAMINECTOMY DISKECTOMY Right 5/10/2016    Procedure: LUMBAR L4-5 HEMILAMINECTOMY DISKECTOMY ;  Surgeon: Arnold Keyes M.D.;  Location: [facility];  Service:    • CERVICAL FUSION POSTERIOR  1/16/2009    Performed by TARA CONTRERAS at [facility]   • HARDWARE REMOVAL NEURO  1/16/2009    Performed by TARA CONTRERAS at [facility]   • NECK EXPLORATION  1/16/2009    Performed by TARA CONTRERAS at [facility]   • SHOULDER ARTHROSCOPY W/ ROTATOR CUFF REPAIR  10/9/08    Performed by SHERLY CASTANEDA at [facility]   • SHOULDER DECOMPRESSION ARTHROSCOPIC  6/17/08    Performed by SHERLY CASTANEDA at [facility]   • CLAVICLE DISTAL EXCISION  6/17/08    Performed by SHERLY CASTANEDA at [facility]   • CERVICAL DISK AND FUSION ANTERIOR  03/12/08   • HYSTERECTOMY, VAGINAL  2006   • APPENDECTOMY  2004   • THYROID LOBECTOMY  1973   • TONSILLECTOMY  1963   • ABDOMINAL HYSTERECTOMY TOTAL     • LUMPECTOMY  1976, 2005    Breast    • LUMPECTOMY         Hospital Medications:    Current Facility-Administered Medications:   •  haloperidol lactate (HALDOL) injection 1 mg, 1 mg, Intravenous, Q4HRS PRN, Raymundo Mccloud M.D.  •  potassium chloride SA (Kdur) tablet 40 mEq, 40 mEq, Oral, TID, Raymundo Mccloud M.D., 40 mEq at  11/11/21 0824  •  LORazepam (ATIVAN) injection 2 mg, 2 mg, Intravenous, Q2HRS PRN, Raymundo Mccloud M.D.  •  polyethylene glycol/lytes (MIRALAX) PACKET 1 Packet, 1 Packet, Oral, QDAY PRN **AND** magnesium hydroxide (MILK OF MAGNESIA) suspension 30 mL, 30 mL, Oral, QDAY PRN **AND** bisacodyl (DULCOLAX) suppository 10 mg, 10 mg, Rectal, QDAY PRN, Raymundo Mccloud M.D.  •  cloNIDine (CATAPRES) tablet 0.1 mg, 0.1 mg, Oral, Q6HRS PRN, Raymundo Mccloud M.D.  •  enalaprilat (Vasotec) injection 1.25 mg 1 mL, 1.25 mg, Intravenous, Q6HRS PRN, Raymundo Mccloud M.D.  •  labetalol (NORMODYNE/TRANDATE) injection 10 mg, 10 mg, Intravenous, Q4HRS PRN, Raymundo Mccloud M.D.  •  ondansetron (ZOFRAN) syringe/vial injection 4 mg, 4 mg, Intravenous, Q4HRS PRN, Raymundo Mccloud M.D., 4 mg at 11/11/21 0037  •  ondansetron (ZOFRAN ODT) dispertab 4 mg, 4 mg, Oral, Q4HRS PRN, Raymundo Mccloud M.D.  •  promethazine (PHENERGAN) tablet 12.5-25 mg, 12.5-25 mg, Oral, Q4HRS PRN, Raymundo Mccloud M.D.  •  promethazine (PHENERGAN) suppository 12.5-25 mg, 12.5-25 mg, Rectal, Q4HRS PRN, Raymundo Mccloud M.D.  •  prochlorperazine (COMPAZINE) injection 5-10 mg, 5-10 mg, Intravenous, Q4HRS PRN, Raymundo Mccloud M.D.  •  Pharmacy Consult Request ...Pain Management Review 1 Each, 1 Each, Other, PHARMACY TO DOSE, Raymundo Mccloud M.D.  •  oxyCODONE immediate-release (ROXICODONE) tablet 5 mg, 5 mg, Oral, Q3HRS PRN **OR** oxyCODONE immediate release (ROXICODONE) tablet 10 mg, 10 mg, Oral, Q3HRS PRN, 10 mg at 11/11/21 0002 **OR** HYDROmorphone (Dilaudid) injection 1 mg, 1 mg, Intravenous, Q3HRS PRN, Raymundo Mccloud M.D.  •  furosemide (LASIX) tablet 40 mg, 40 mg, Oral, DAILY, Raymundo Mccloud M.D., 40 mg at 11/11/21 0623  •  omeprazole (PRILOSEC) capsule 20 mg, 20 mg, Oral, QAM, Raymundo Mccloud M.D., 20 mg at 11/11/21 0624  •  sertraline (Zoloft) tablet 100 mg, 100 mg, Oral, Q EVENING, Raymundo Mccloud M.D.  •  spironolactone (ALDACTONE) tablet 25 mg, 25 mg, Oral, DAILY, Raymundo Mccloud M.D., 25 mg at 11/11/21 0625  •  levothyroxine  (SYNTHROID) tablet 225 mcg, 225 mcg, Oral, QAM, Raymundo Mccloud M.D., 225 mcg at 11/11/21 0624  •  traZODone (DESYREL) tablet 100 mg, 100 mg, Oral, QHS, Raymundo Mccloud M.D., 100 mg at 11/10/21 2011  •  ursodiol (ACTIGALL) capsule 300 mg, 300 mg, Oral, DAILY, Raymundo Mccloud M.D., 300 mg at 11/11/21 0627  •  amoxicillin-clavulanate (AUGMENTIN) 875-125 MG per tablet 1 Tablet, 1 Tablet, Oral, Q12HRS, Anu Figueroa M.D., 1 Tablet at 11/11/21 0628  •  insulin glargine (Semglee) injection, 15 Units, Subcutaneous, Q EVENING, Raymundo Mccloud M.D., 15 Units at 11/10/21 2028  •  insulin regular (HumuLIN R,NovoLIN R) injection, 1-6 Units, Subcutaneous, 4X/DAY ACHS, 2 Units at 11/11/21 0635 **AND** POC blood glucose manual result, , , Q AC AND BEDTIME(S) **AND** NOTIFY MD and PharmD, , , Once **AND** glucose 4 g chewable tablet 16 g, 16 g, Oral, Q15 MIN PRN **AND** dextrose 50% (D50W) injection 50 mL, 50 mL, Intravenous, Q15 MIN PRN, Raymundo Mccloud M.D.    Current Outpatient Medications:  Current Facility-Administered Medications   Medication Dose Route Frequency Provider Last Rate Last Admin   • haloperidol lactate (HALDOL) injection 1 mg  1 mg Intravenous Q4HRS PRN Raymundo Mccloud M.D.       • potassium chloride SA (Kdur) tablet 40 mEq  40 mEq Oral TID Raymundo Mccloud M.D.   40 mEq at 11/11/21 0824   • LORazepam (ATIVAN) injection 2 mg  2 mg Intravenous Q2HRS PRN Raymundo Mccloud M.D.       • polyethylene glycol/lytes (MIRALAX) PACKET 1 Packet  1 Packet Oral QDAY PRN Raymundo Mccloud M.D.        And   • magnesium hydroxide (MILK OF MAGNESIA) suspension 30 mL  30 mL Oral QDAY PRN Raymundo Mccloud M.D.        And   • bisacodyl (DULCOLAX) suppository 10 mg  10 mg Rectal QDAY PRN Raymundo Mccloud M.D.       • cloNIDine (CATAPRES) tablet 0.1 mg  0.1 mg Oral Q6HRS PRN Raymundo Mccloud M.D.       • enalaprilat (Vasotec) injection 1.25 mg 1 mL  1.25 mg Intravenous Q6HRS PRN Raymundo Mccloud M.D.       • labetalol (NORMODYNE/TRANDATE) injection 10 mg  10 mg Intravenous Q4HRS PRN Raymundo  DIPESH Mccloud       • ondansetron (ZOFRAN) syringe/vial injection 4 mg  4 mg Intravenous Q4HRS PRN Raymundo Mccloud M.D.   4 mg at 11/11/21 0037   • ondansetron (ZOFRAN ODT) dispertab 4 mg  4 mg Oral Q4HRS PRN Raymundo Mccloud M.D.       • promethazine (PHENERGAN) tablet 12.5-25 mg  12.5-25 mg Oral Q4HRS PRN Raymundo Mccloud M.D.       • promethazine (PHENERGAN) suppository 12.5-25 mg  12.5-25 mg Rectal Q4HRS PRN Raymundo Mccloud M.D.       • prochlorperazine (COMPAZINE) injection 5-10 mg  5-10 mg Intravenous Q4HRS PRN Raymundo Mccloud M.D.       • Pharmacy Consult Request ...Pain Management Review 1 Each  1 Each Other PHARMACY TO DOSE Raymundo Mccloud M.D.       • oxyCODONE immediate-release (ROXICODONE) tablet 5 mg  5 mg Oral Q3HRS PRN Raymundo Mccloud M.D.        Or   • oxyCODONE immediate release (ROXICODONE) tablet 10 mg  10 mg Oral Q3HRS PRN Raymundo Mccloud M.D.   10 mg at 11/11/21 0002    Or   • HYDROmorphone (Dilaudid) injection 1 mg  1 mg Intravenous Q3HRS PRN Raymundo Mccloud M.D.       • furosemide (LASIX) tablet 40 mg  40 mg Oral DAILY Raymundo Mccloud M.D.   40 mg at 11/11/21 0623   • omeprazole (PRILOSEC) capsule 20 mg  20 mg Oral BHUMI Mccloud M.D.   20 mg at 11/11/21 0624   • sertraline (Zoloft) tablet 100 mg  100 mg Oral Q EVENING Raymundo Mccloud M.D.       • spironolactone (ALDACTONE) tablet 25 mg  25 mg Oral DAILY Raymundo Mccloud M.D.   25 mg at 11/11/21 0625   • levothyroxine (SYNTHROID) tablet 225 mcg  225 mcg Oral QABAILEY Mccloud M.D.   225 mcg at 11/11/21 0624   • traZODone (DESYREL) tablet 100 mg  100 mg Oral QHS Raymundo Mccloud M.D.   100 mg at 11/10/21 2011   • ursodiol (ACTIGALL) capsule 300 mg  300 mg Oral DAILY Raymundo Mccloud M.D.   300 mg at 11/11/21 0627   • amoxicillin-clavulanate (AUGMENTIN) 875-125 MG per tablet 1 Tablet  1 Tablet Oral Q12HRS Anu Figueroa M.D.   1 Tablet at 11/11/21 0628   • insulin glargine (Semglee) injection  15 Units Subcutaneous Q EVENING Raymundo Mccloud M.D.   15 Units at 11/10/21 2028   • insulin regular (HumuLIN  R,NovoLIN R) injection  1-6 Units Subcutaneous 4X/DAY BILLY Mcclodu M.D.   2 Units at 11/11/21 0635    And   • glucose 4 g chewable tablet 16 g  16 g Oral Q15 MIN PRN Raymundo Mccloud M.D.        And   • dextrose 50% (D50W) injection 50 mL  50 mL Intravenous Q15 MIN PRN Raymundo Mccloud M.D.           Medication Allergy:  Allergies   Allergen Reactions   • Tape Unspecified     Blisters, paper tape is ok       Family History:  Family History   Problem Relation Age of Onset   • Hypertension Mother    • Cancer Father        Social History:  Social History     Socioeconomic History   • Marital status:      Spouse name: Not on file   • Number of children: Not on file   • Years of education: Not on file   • Highest education level: Not on file   Occupational History   • Not on file   Tobacco Use   • Smoking status: Current Every Day Smoker     Packs/day: 0.50     Years: 30.00     Pack years: 15.00     Types: Cigarettes   • Smokeless tobacco: Never Used   Vaping Use   • Vaping Use: Never used   Substance and Sexual Activity   • Alcohol use: Not Currently   • Drug use: Yes     Comment: Marijuana   • Sexual activity: Not on file   Other Topics Concern   • Not on file   Social History Narrative   • Not on file     Social Determinants of Health     Financial Resource Strain: Low Risk    • Difficulty of Paying Living Expenses: Not very hard   Food Insecurity: No Food Insecurity   • Worried About Running Out of Food in the Last Year: Never true   • Ran Out of Food in the Last Year: Never true   Transportation Needs: No Transportation Needs   • Lack of Transportation (Medical): No   • Lack of Transportation (Non-Medical): No   Physical Activity:    • Days of Exercise per Week: Not on file   • Minutes of Exercise per Session: Not on file   Stress:    • Feeling of Stress : Not on file   Social Connections:    • Frequency of Communication with Friends and Family: Not on file   • Frequency of Social Gatherings with Friends and  Family: Not on file   • Attends Voodoo Services: Not on file   • Active Member of Clubs or Organizations: Not on file   • Attends Club or Organization Meetings: Not on file   • Marital Status: Not on file   Intimate Partner Violence:    • Fear of Current or Ex-Partner: Not on file   • Emotionally Abused: Not on file   • Physically Abused: Not on file   • Sexually Abused: Not on file   Housing Stability:    • Unable to Pay for Housing in the Last Year: Not on file   • Number of Places Lived in the Last Year: Not on file   • Unstable Housing in the Last Year: Not on file         MDM (Data Review):     Records reviewed and summarized in current documentation    Lab Data Review:  Recent Results (from the past 24 hour(s))   POCT glucose device results    Collection Time: 11/10/21  8:15 PM   Result Value Ref Range    Glucose - Accu-Ck 327 (H) 65 - 99 mg/dL   CBC WITH DIFFERENTIAL    Collection Time: 11/11/21 12:41 AM   Result Value Ref Range    WBC 11.6 (H) 4.8 - 10.8 K/uL    RBC 2.71 (L) 4.20 - 5.40 M/uL    Hemoglobin 8.5 (L) 12.0 - 16.0 g/dL    Hematocrit 27.9 (L) 37.0 - 47.0 %    .0 (H) 81.4 - 97.8 fL    MCH 31.4 27.0 - 33.0 pg    MCHC 30.5 (L) 33.6 - 35.0 g/dL    RDW 59.9 (H) 35.9 - 50.0 fL    Platelet Count 478 (H) 164 - 446 K/uL    MPV 10.9 9.0 - 12.9 fL    Neutrophils-Polys 75.00 (H) 44.00 - 72.00 %    Lymphocytes 10.40 (L) 22.00 - 41.00 %    Monocytes 9.90 0.00 - 13.40 %    Eosinophils 2.80 0.00 - 6.90 %    Basophils 1.60 0.00 - 1.80 %    Immature Granulocytes 0.30 0.00 - 0.90 %    Nucleated RBC 0.00 /100 WBC    Neutrophils (Absolute) 8.69 (H) 2.00 - 7.15 K/uL    Lymphs (Absolute) 1.21 1.00 - 4.80 K/uL    Monos (Absolute) 1.15 (H) 0.00 - 0.85 K/uL    Eos (Absolute) 0.32 0.00 - 0.51 K/uL    Baso (Absolute) 0.19 (H) 0.00 - 0.12 K/uL    Immature Granulocytes (abs) 0.04 0.00 - 0.11 K/uL    NRBC (Absolute) 0.00 K/uL   Comp Metabolic Panel    Collection Time: 11/11/21 12:41 AM   Result Value Ref Range     Sodium 130 (L) 135 - 145 mmol/L    Potassium 3.3 (L) 3.6 - 5.5 mmol/L    Chloride 93 (L) 96 - 112 mmol/L    Co2 25 20 - 33 mmol/L    Anion Gap 12.0 7.0 - 16.0    Glucose 260 (H) 65 - 99 mg/dL    Bun 17 8 - 22 mg/dL    Creatinine 0.98 0.50 - 1.40 mg/dL    Calcium 8.4 (L) 8.5 - 10.5 mg/dL    AST(SGOT) 119 (H) 12 - 45 U/L    ALT(SGPT) 99 (H) 2 - 50 U/L    Alkaline Phosphatase 2176 (H) 30 - 99 U/L    Total Bilirubin 7.1 (H) 0.1 - 1.5 mg/dL    Albumin 2.5 (L) 3.2 - 4.9 g/dL    Total Protein 6.5 6.0 - 8.2 g/dL    Globulin 4.0 (H) 1.9 - 3.5 g/dL    A-G Ratio 0.6 g/dL   ESTIMATED GFR    Collection Time: 21 12:41 AM   Result Value Ref Range    GFR If African American >60 >60 mL/min/1.73 m 2    GFR If Non  57 (A) >60 mL/min/1.73 m 2   POCT glucose device results    Collection Time: 21  6:22 AM   Result Value Ref Range    Glucose - Accu-Ck 236 (H) 65 - 99 mg/dL   EKG    Collection Time: 21  8:31 AM   Result Value Ref Range    Report       [facility]    Test Date:  2021  Pt Name:    KALPANA BUTTS               Department: [facility]  MRN:        [id number]                      Room:       T207  Gender:     Female                       Technician: [facility]  :        [id number]                   Requested By:SHAHZAD PRATT  Order #:    [id number]                    Reading MD:    Measurements  Intervals                                Axis  Rate:       73                           P:          62  NE:         140                          QRS:        22  QRSD:       83                           T:          26  QT:         385  QTc:        425    Interpretive Statements  SINUS RHYTHM  BORDERLINE LOW VOLTAGE, EXTREMITY LEADS  Compared to ECG 10/23/2021 19:41:08  No significant changes         Imaging/Procedures Review:      NM-HEPATOBILIARY SCAN   Final Result      1.  Very limited examination as only single 6 hour delayed image could be obtained.   2.  No gross evidence for bile leak.   3.  Common bile  duct is patent.      [id number]    (Results Pending)        MDM (Assessment and Plan):     Patient Active Problem List    Diagnosis Date Noted   • Fluid collection at surgical site 11/10/2021   • Right upper quadrant pain 11/10/2021   • Anasarca 10/29/2021   • Chronic pain 10/26/2021   • Cholecystitis 10/25/2021   • Dehydration 10/21/2021   • Bilious vomiting with nausea 10/21/2021   • Hepatitis 10/21/2021   • Jaundice 10/20/2021   • Current moderate episode of major depressive disorder (HCC) 10/17/2021   • Generalized abdominal pain 10/08/2021   • Epigastric pain 09/28/2021   • Elevated liver enzymes 09/23/2021   • Anemia 09/23/2021   • Hyponatremia 09/23/2021   • Elevated troponin 09/23/2021   • Pain in the chest 08/17/2020   • CAD S/P percutaneous coronary angioplasty 08/11/2020   • Hypothyroidism in adult 08/10/2020   • CKD (chronic kidney disease) stage 3, GFR 30-59 ml/min ([facility]) 06/23/2020   • Dyslipidemia 06/23/2020   • GERD (gastroesophageal reflux disease) 06/23/2020   • Lung nodule 06/23/2020   • Hemoptysis 06/23/2020   • Anxiety 01/25/2020   • Nausea & vomiting 01/25/2020   • Hypertension 01/24/2020   • Hypoglycemia 01/22/2020   • RHEA (acute kidney injury) ([facility]) 01/21/2020   • Post-operative wound abscess 01/18/2020   • Tobacco abuse 01/18/2020   • Cellulitis 03/15/2018   • Left hip pain 03/15/2018   • Acute left lumbar radiculopathy 08/31/2016   • Lumbar spinal stenosis 05/10/2016   • Type 1 diabetes mellitus with neurological manifestations, uncontrolled ([facility]) 06/10/2015   • Diabetic polyneuropathy ([facility]) 06/10/2015   • Vertigo 04/08/2015   • Uncontrolled type 1 diabetes mellitus ([facility]) 09/25/2013   • Advanced care planning/counseling discussion 09/25/2013   • Accidental drug overdose 08/27/2012   • Vitamin d deficiency 03/14/2012   • Hypothyroidism, postsurgical    • Cigarette smoker      This is a 66-year-old female, admitted to the hospital with persistent intermittent episode of  epigastric/periumbilical pain radiating to mid back.  She has been experiencing jaundice for the past 6 weeks.  Recently, diagnosed with liver disease, unknown etiology.  She had a liver biopsy done-pending interpretation by [place].  She had an ERCP with biliary stent placement 10/24/2021 followed by a laparoscopic cholecystectomy 10/28/2021.  Since then, she has had intermittent episodes of severe upper abdominal pain radiating to mid back, nausea/vomiting.  Labs: .  ALT 95.  Alkaline phosphatase 2398.  Total bilirubin 7.1.  CT scan abdomen/pelvis: Fluid and air within the gallbladder fossa concerning for abscess given timing since prior surgery.    ASSESSMENT:  1.  Epigastric/periumbilical pain radiating to mid back-concern for bile leak versus abscess  2.  Intermittent nausea/vomiting  3.  Liver disease-unknown etiology likely [facility]ILLER, she had an evaluation at [facility].  Status post liver biopsy-pending interpretation from [facility] [place].  4.  Elevated LFTs-total bilirubin has trended down since 10/21/2021.    5.  Persistent jaundice  6.  Type 1 diabetes    Plan:  1.  Unable to do HIDA due to elevated TB? Will hold off for now.  2.  Recommend surgical consultation concern for abscess versus biliary leak - Appreciate input!  3.  IV fluids, antiemetics, pain medication per primary team  4.  N.p.o.  5. MRCP for biliary stones, obstruction, leak        Will follow for now.    Pieter Blanchard M.D.

## 2021-11-11 NOTE — ASSESSMENT & PLAN NOTE
Her total bilirubin, alk phos, transaminases are markedly elevated although are at baseline for her  GI consulted

## 2021-11-11 NOTE — PROGRESS NOTES
"Pt with new onset confusion and agitation. Tearful, stating \" I don't know what's going on, I wanna go home. Why did my daughter leave me here in [place].\" Explained to pt she is not in [place], pt verbalized understanding but continued to yell and cry out loud. Medicated for anxiety.   "

## 2021-11-11 NOTE — PROGRESS NOTES
Pt arrive to unit. Pt lethargic but responds to stimulus. Pt swallow pills without difficulty. Pt falls asleep between questions. Pt denies needs at this time. Call light within reach of pt.

## 2021-11-11 NOTE — PROGRESS NOTES
Pt attempting to get out of bed. Gait unsteady, pt confused. Making incomprehensible statements. Reoriented and assisted back into bed.

## 2021-11-11 NOTE — DISCHARGE PLANNING
Received Choice form at [id number]  Agency/Facility Name: [facility]  Referral sent per Choice form @ [id number]

## 2021-11-11 NOTE — ASSESSMENT & PLAN NOTE
I discussed care plan with daughter Farzaneh after her discussion with ERP - she is in agreement with a referral for Hospice.   Her goal is make Pt comfortable and be able to make the planned trip.   We did discuss about questionable abscess and GI's recommendation for surgery evaluation which daughter agrees.     Total time spent 15 minutes

## 2021-11-11 NOTE — PROGRESS NOTES
"Pt increasingly anxious. States \"I wanna jump out of this bed, it feels like things are crawling off of my legs. I don't know where I am.\" Reoriented to place and situation & medicated for anxiety.   "

## 2021-11-11 NOTE — CONSULTS
DATE OF CONSULTATION:  11/10/2021     REFERRING PHYSICIAN:   Raymundo Mccloud M.D.     CONSULTING PHYSICIAN:  Anu Figueroa M.D.     REASON FOR CONSULTATION:  Fluid collection s/p peter.   I have been asked by  to see the patient in surgical consultation for evaluation of small gallbladder fossa fluid collection and right upper quadrant pain following lap peter on 10/28.    HISTORY OF PRESENT ILLNESS: The patient is a 66 year-old  woman who presents to the Emergency Department with a 4- months history of moderate right upper quadrant  and generalized abdominal pain. The pain is associated with nausea, malaise and severe jaundice. Patient has a history of cryptogenic liver failure for which she has been worked up both here as well as [place] and [facility]. She is had multiple visits to those places. She had her gallbladder taken out here on 10/28 by Dr. Trammell. Since that time she has convalesced reasonably appropriately although she continues to have jaundice, abdominal pain, and increased bilirubin.  She also has an extraordinarily elevated alk phos.      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    PAST SURGICAL HISTORY:  has a past surgical history that includes hysterectomy, vaginal (2006); thyroid lobectomy (1973); lumpectomy (1976, 2005); cervical disk and fusion anterior (03/12/08); tonsillectomy (1963); cervical fusion posterior (1/16/2009);  hardware removal neuro (1/16/2009); neck exploration (1/16/2009); abdominal hysterectomy total; lumpectomy; lumbar laminectomy diskectomy (Right, 5/10/2016); shoulder decompression arthroscopic (6/17/08); clavicle distal excision (6/17/08); shoulder arthroscopy w/ rotator cuff repair (10/9/08); njx aa&/strd tfrml epi lumbar/sacral 1 level (Right, 8/31/2016); spinal cord stimulator (N/A, 10/26/2018); thoracic laminectomy (N/A, 10/26/2018); appendectomy (2004); implant neurostim/ (N/A, 12/16/2019); irrigation & debridement neuro (1/19/2020); cath placement (1/25/2020); ercp,diagnostic (N/A, 10/26/2021); upper gi endoscopy,biopsy (N/A, 10/26/2021); upper gi endoscopy,diagnosis (N/A, 10/26/2021); and peter by laparoscopy (N/A, 10/28/2021).     ALLERGIES:   Allergies   Allergen Reactions   • Tape Unspecified     Blisters, paper tape is ok        CURRENT MEDICATIONS:   Home Medications     Reviewed by Raymundo Mccloud M.D. (Physician) on 11/10/21 at 1530  Med List Status: Complete   Medication Last Dose Status   ALPRAZolam (XANAX) 0.5 MG Tab 11/9/2021 Active   Ascorbic Acid (VITAMIN C PO) 11/9/2021 Active   aspirin 81 MG EC tablet 11/9/2021 Active   B Complex Vitamins (VITAMIN B COMPLEX PO) 11/9/2021 Active   Continuous Blood Gluc Sensor (FREESTYLE PARISA 14 DAY SENSOR) Misc UNK Active   Docusate Calcium (STOOL SOFTENER PO) 11/9/2021 Active   furosemide (LASIX) 40 MG Tab 11/9/2021 Active   insulin aspart (NOVOLOG FLEXPEN) 100 UNIT/ML injection PEN 11/9/2021 Active   levothyroxine (SYNTHROID) 25 MCG Tab 11/9/2021 Active   metoprolol (TOPROL-XL) 200 MG XL tablet 11/9/2021 Active   NARCAN 4 MG/0.1ML Liquid PRN Active   ondansetron (ZOFRAN ODT) 4 MG TABLET DISPERSIBLE 11/8/2021 Active   oxyCODONE immediate-release (ROXICODONE) 5 MG Tab 11/6/2021 Active   pantoprazole (PROTONIX) 40 MG Tablet Delayed Response 11/9/2021 Active   pyridoxine (VITAMIN B-6) 50 MG Tab 11/9/2021 Active   sertraline (ZOLOFT) 100 MG Tab 11/9/2021  "Active   spironolactone (ALDACTONE) 25 MG Tab 11/9/2021 Active   SYNTHROID 200 MCG Tab 11/9/2021 Active   traZODone (DESYREL) 100 MG Tab 11/9/2021 Active   TRESIBA FLEXTOUCH 100 UNIT/ML Solution Pen-injector 11/9/2021 Active   ursodiol (ACTIGALL) 300 MG Cap 11/9/2021 Active   vitamin D3 (QC VITAMIN D3) 5000 Unit (125 mcg) Tab 11/9/2021 Active                FAMILY HISTORY:   Family History   Problem Relation Age of Onset   • Hypertension Mother    • Cancer Father        SOCIAL HISTORY:   Social History     Tobacco Use   • Smoking status: Current Every Day Smoker     Packs/day: 0.50     Years: 30.00     Pack years: 15.00     Types: Cigarettes   • Smokeless tobacco: Never Used   Vaping Use   • Vaping Use: Never used   Substance and Sexual Activity   • Alcohol use: Not Currently   • Drug use: Yes     Comment: Marijuana   • Sexual activity: Not on file       REVIEW OF SYSTEMS: Comprehensive review of systems is negative with the exception of the aforementioned HPI, PMH, and PSH bullets in accordance with CMS guidelines.     PHYSICAL EXAMINATION:   General Appearance: The patient is a Ill-appearing elderly woman in mild distress.  Patient is very jaundiced  VITAL SIGNS: BP (!) 164/78   Pulse 65   Temp 36.7 °C (98 °F) (Temporal)   Resp 20   Ht 1.676 m (5' 6\")   Wt 61.5 kg (135 lb 9.3 oz)   SpO2 100%   HEAD AND NECK: Demonstrates symmetric, reactive pupils. Extraocular muscles are intact. Nares and oropharynx are clear.  Icteric sclera  NECK: Supple. No adenopathy.  CHEST:    Inspection: Unlabored respirations, no intercostal retractions, paradoxical motion, or accessory muscle use.   Palpation:  The chest is nontender.    Auscultation: normal.  CARDIOVASCULAR:   Inspection: The skin is cool.  Auscultation: Regular rate and rhythm.   Peripheral Pulses: Normal.    ABDOMEN:   Inspection: Abdominal inspection reveals no abrasions, contusions, lacerations or penetrating wounds and Incisions are healing well.   Palpation: " Palpation is remarkable for no significant tenderness, guarding, or peritoneal findings. No abdominal wall hernias.  EXTREMITIES:   Examination of the upper and lower extremities demonstrates No cyanosis, edema, or clubbing of the nails.  NEUROLOGIC:   Neurologic examination reveals no focal deficits noted.  PSYCHIATRIC:   The patient Difficult to communicate with because of fatigue.    LABORATORY VALUES:   Recent Labs     11/10/21  0523   WBC 11.8*   RBC 2.61*   HEMOGLOBIN 8.5*   HEMATOCRIT 26.8*   .7*   MCH 32.6   MCHC 31.7*   RDW 61.0*   PLATELETCT 461*   MPV 11.6     Recent Labs     11/10/21  0523   SODIUM 131*   POTASSIUM 4.0   CHLORIDE 94*   CO2 27   GLUCOSE 290*   BUN 17   CREATININE 0.83   CALCIUM 8.2*     Recent Labs     11/10/21  0523   ASTSGOT 171*   ALTSGPT 95*   TBILIRUBIN 7.1*   ALKPHOSPHAT 2398*   GLOBULIN 3.8*            IMAGING:   NM-HEPATOBILIARY SCAN    (Results Pending)   [id number]    (Results Pending)       IMPRESSION AND PLAN:  Fluid collection at surgical site- (present on admission)  Assessment & Plan  Fluid is present in the gallbladder fossa.  There is some air present this as well raising the concern for abscess.  She does have a mild elevation of leukocytosis, however, she did have multiple rounds of Gonzalo use during her initial operation.  This could also give the appearance of an abscess.  Recommend starting oral antibiotics and running a course for 5 days.  In the event that she has increased pain or other concerns this could be sampled by interventional radiology for further directive care of her antibiotics.         ____________________________________     Anu Figueroa M.D.    DD: 11/10/2021  5:21 PM

## 2021-11-11 NOTE — DISCHARGE PLANNING
Agency/Facility Name: [facility]  Spoke To: Admissions  Outcome: Referral has been accepted, will get in contact with pt's daughter.

## 2021-11-12 NOTE — PROGRESS NOTES
Patient IV dc'd. Discharge instructions given to patient and patient's daughter/POA Farzaneh; patient and daughter verbalizes understanding, all questions answered. Copy of DC summary provided, signed copy in chart. 1 prescription electronically sent to patient's pharmacy,copies placed in patient chart. Patient states personal belongings are in possession. Patient escorted off unit by CNA without incident.

## 2021-11-12 NOTE — DISCHARGE INSTRUCTIONS
Discharge Instructions    Discharged to home by car with relative. Discharged via wheelchair, hospital escort: Yes.  Special equipment needed: Not Applicable    Be sure to schedule a follow-up appointment with your primary care doctor or any specialists as instructed.     Discharge Plan:   Diet Plan: Discussed  Activity Level: Discussed  Confirmed Symptoms Management: Discussed  Medication Reconciliation Updated: Yes  Influenza Vaccine Indication: Patient Refuses    I understand that a diet low in cholesterol, fat, and sodium is recommended for good health. Unless I have been given specific instructions below for another diet, I accept this instruction as my diet prescription.   Other diet: heart healthy    Special Instructions: None    · Is patient discharged on Warfarin / Coumadin?   No     Depression / Suicide Risk    As you are discharged from this [facility] facility, it is important to learn how to keep safe from harming yourself.    Recognize the warning signs:  · Abrupt changes in personality, positive or negative- including increase in energy   · Giving away possessions  · Change in eating patterns- significant weight changes-  positive or negative  · Change in sleeping patterns- unable to sleep or sleeping all the time   · Unwillingness or inability to communicate  · Depression  · Unusual sadness, discouragement and loneliness  · Talk of wanting to die  · Neglect of personal appearance   · Rebelliousness- reckless behavior  · Withdrawal from people/activities they love  · Confusion- inability to concentrate     If you or a loved one observes any of these behaviors or has concerns about self-harm, here's what you can do:  · Talk about it- your feelings and reasons for harming yourself  · Remove any means that you might use to hurt yourself (examples: pills, rope, extension cords, firearm)  · Get professional help from the community (Mental Health, Substance Abuse, psychological counseling)  · Do not be  alone:Call your Safe Contact- someone whom you trust who will be there for you.  · Call your local CRISIS HOTLINE 815-0544 or 756-299-6571  · Call your local Children's Mobile Crisis Response Team [place] (918) 421-9460 or www.[employer]  · Call the toll free National Suicide Prevention Hotlines   · National Suicide Prevention Lifeline 185-921-OODF (5613)  · National [employer] Line Network 800-SUICIDE (205-5364)

## 2021-11-12 NOTE — PROGRESS NOTES
"  DATE: 11/11/2021    Hospital Day 2 back and abdominal pain, post peter.    Interval Events:  Pain improved per patient.  Leukocytosis equivocal.  MRCP pending per GI.  GEETA ok.  Likely alex in the gallbladder fossa, could have IR drain if no resolution.    PHYSICAL EXAMINATION:  Vital Signs: /91   Pulse 70   Temp 36.5 °C (97.7 °F) (Temporal)   Resp 18   Ht 1.676 m (5' 6\")   Wt 61.5 kg (135 lb 9.3 oz)   SpO2 91%     Abdomen soft and minimally tender, incisions are c/d/i.    Laboratory Values:   Recent Labs     11/10/21  0523 11/11/21  0041   WBC 11.8* 11.6*   RBC 2.61* 2.71*   HEMOGLOBIN 8.5* 8.5*   HEMATOCRIT 26.8* 27.9*   .7* 103.0*   MCH 32.6 31.4   MCHC 31.7* 30.5*   RDW 61.0* 59.9*   PLATELETCT 461* 478*   MPV 11.6 10.9     Recent Labs     11/10/21  0523 11/11/21  0041   SODIUM 131* 130*   POTASSIUM 4.0 3.3*   CHLORIDE 94* 93*   CO2 27 25   GLUCOSE 290* 260*   BUN 17 17   CREATININE 0.83 0.98   CALCIUM 8.2* 8.4*     Recent Labs     11/10/21  0523 11/11/21  0041   ASTSGOT 171* 119*   ALTSGPT 95* 99*   TBILIRUBIN 7.1* 7.1*   ALKPHOSPHAT 2398* 2176*   GLOBULIN 3.8* 4.0*            Imaging:   NM-HEPATOBILIARY SCAN   Final Result      1.  Very limited examination as only single 6 hour delayed image could be obtained.   2.  No gross evidence for bile leak.   3.  Common bile duct is patent.      [id number]    (Results Pending)       ASSESSMENT AND PLAN:     Fluid collection at surgical site- (present on admission)  Assessment & Plan  Fluid is present in the gallbladder fossa.  There is some air present this as well raising the concern for abscess.  She does have a mild elevation of leukocytosis, however, she did have multiple rounds of Alex use during her initial operation.  This could also give the appearance of an abscess.  Recommend starting oral antibiotics and running a course for 5 days.  In the event that she has increased pain or other concerns this could be sampled by " interventional radiology for further directive care of her antibiotics.           ____________________________________     Anu Figueroa M.D.    DD: 11/11/2021  4:35 PM

## 2021-11-12 NOTE — PROGRESS NOTES
"Covid 19 surge in effect    Report received from Shanna MORSE. Patient care assumed. Patient at this time is very anxious, crying, and screaming, \"I want to go home. I dont want to be put in a cage. I just need my family to come and get me. I want to go home\". Patient was medicated for anxiety and agitation (See MAR). Will continue to monitor. V/S at this time.  "

## 2021-11-12 NOTE — PROGRESS NOTES
"Spoke with MD regarding patient discharge, and patient was verbally okayed to be discharge this evening. Patient's daughter and BERENICE Moy was called, and stated she would arrive in roughly 45 minutes to  the patient. Patient at this time will be given even medication, and prepared for discharge. V/S stable at this time, and patient is intermittently crying out \"I want to go home\" but resting otherwise. Will continue to monitor, and prepare for dc  "

## 2021-11-19 ENCOUNTER — HOSPITAL ENCOUNTER (EMERGENCY)
Facility: MEDICAL CENTER | Age: 66
End: 2021-11-19
Attending: EMERGENCY MEDICINE
Payer: MEDICARE

## 2021-11-19 VITALS
SYSTOLIC BLOOD PRESSURE: 125 MMHG | HEART RATE: 62 BPM | TEMPERATURE: 98.5 F | RESPIRATION RATE: 14 BRPM | OXYGEN SATURATION: 98 % | HEIGHT: 66 IN | BODY MASS INDEX: 19.42 KG/M2 | DIASTOLIC BLOOD PRESSURE: 78 MMHG | WEIGHT: 120.81 LBS

## 2021-11-19 DIAGNOSIS — R07.9 CHEST PAIN, UNSPECIFIED TYPE: Primary | ICD-10-CM

## 2021-11-19 DIAGNOSIS — G25.81 RESTLESS LEGS: ICD-10-CM

## 2021-11-19 LAB
ALBUMIN SERPL BCP-MCNC: 2.9 G/DL (ref 3.2–4.9)
ALBUMIN/GLOB SERPL: 0.8 G/DL
ALP SERPL-CCNC: 2105 U/L (ref 30–99)
ALT SERPL-CCNC: 85 U/L (ref 2–50)
ANION GAP SERPL CALC-SCNC: 14 MMOL/L (ref 7–16)
AST SERPL-CCNC: 126 U/L (ref 12–45)
BASOPHILS # BLD AUTO: 1.3 % (ref 0–1.8)
BASOPHILS # BLD: 0.16 K/UL (ref 0–0.12)
BILIRUB SERPL-MCNC: 5.3 MG/DL (ref 0.1–1.5)
BUN SERPL-MCNC: 28 MG/DL (ref 8–22)
CALCIUM SERPL-MCNC: 8.6 MG/DL (ref 8.4–10.2)
CHLORIDE SERPL-SCNC: 90 MMOL/L (ref 96–112)
CO2 SERPL-SCNC: 23 MMOL/L (ref 20–33)
CREAT SERPL-MCNC: 1.1 MG/DL (ref 0.5–1.4)
EKG IMPRESSION: NORMAL
EOSINOPHIL # BLD AUTO: 0.22 K/UL (ref 0–0.51)
EOSINOPHIL NFR BLD: 1.8 % (ref 0–6.9)
ERYTHROCYTE [DISTWIDTH] IN BLOOD BY AUTOMATED COUNT: 53 FL (ref 35.9–50)
GLOBULIN SER CALC-MCNC: 3.5 G/DL (ref 1.9–3.5)
GLUCOSE SERPL-MCNC: 226 MG/DL (ref 65–99)
HCT VFR BLD AUTO: 29.4 % (ref 37–47)
HGB BLD-MCNC: 9.7 G/DL (ref 12–16)
IMM GRANULOCYTES # BLD AUTO: 0.04 K/UL (ref 0–0.11)
IMM GRANULOCYTES NFR BLD AUTO: 0.3 % (ref 0–0.9)
LYMPHOCYTES # BLD AUTO: 1.44 K/UL (ref 1–4.8)
LYMPHOCYTES NFR BLD: 11.8 % (ref 22–41)
MCH RBC QN AUTO: 32.8 PG (ref 27–33)
MCHC RBC AUTO-ENTMCNC: 33 G/DL (ref 33.6–35)
MCV RBC AUTO: 99.3 FL (ref 81.4–97.8)
MONOCYTES # BLD AUTO: 1.44 K/UL (ref 0–0.85)
MONOCYTES NFR BLD AUTO: 11.8 % (ref 0–13.4)
NEUTROPHILS # BLD AUTO: 8.88 K/UL (ref 2–7.15)
NEUTROPHILS NFR BLD: 73 % (ref 44–72)
NRBC # BLD AUTO: 0 K/UL
NRBC BLD-RTO: 0 /100 WBC
PLATELET # BLD AUTO: 459 K/UL (ref 164–446)
PMV BLD AUTO: 10.9 FL (ref 9–12.9)
POTASSIUM SERPL-SCNC: 4.1 MMOL/L (ref 3.6–5.5)
PROT SERPL-MCNC: 6.4 G/DL (ref 6–8.2)
RBC # BLD AUTO: 2.96 M/UL (ref 4.2–5.4)
SODIUM SERPL-SCNC: 127 MMOL/L (ref 135–145)
TROPONIN T SERPL-MCNC: 26 NG/L (ref 6–19)
TROPONIN T SERPL-MCNC: 29 NG/L (ref 6–19)
WBC # BLD AUTO: 12.2 K/UL (ref 4.8–10.8)

## 2021-11-19 PROCEDURE — 700111 HCHG RX REV CODE 636 W/ 250 OVERRIDE (IP)

## 2021-11-19 PROCEDURE — 700111 HCHG RX REV CODE 636 W/ 250 OVERRIDE (IP): Performed by: EMERGENCY MEDICINE

## 2021-11-19 PROCEDURE — 85025 COMPLETE CBC W/AUTO DIFF WBC: CPT

## 2021-11-19 PROCEDURE — A9270 NON-COVERED ITEM OR SERVICE: HCPCS | Performed by: EMERGENCY MEDICINE

## 2021-11-19 PROCEDURE — 93005 ELECTROCARDIOGRAM TRACING: CPT

## 2021-11-19 PROCEDURE — 700105 HCHG RX REV CODE 258: Performed by: EMERGENCY MEDICINE

## 2021-11-19 PROCEDURE — 96374 THER/PROPH/DIAG INJ IV PUSH: CPT

## 2021-11-19 PROCEDURE — 80053 COMPREHEN METABOLIC PANEL: CPT

## 2021-11-19 PROCEDURE — 93005 ELECTROCARDIOGRAM TRACING: CPT | Performed by: EMERGENCY MEDICINE

## 2021-11-19 PROCEDURE — 96372 THER/PROPH/DIAG INJ SC/IM: CPT | Mod: XU

## 2021-11-19 PROCEDURE — 700102 HCHG RX REV CODE 250 W/ 637 OVERRIDE(OP): Performed by: EMERGENCY MEDICINE

## 2021-11-19 PROCEDURE — 84484 ASSAY OF TROPONIN QUANT: CPT | Mod: 91

## 2021-11-19 PROCEDURE — 99285 EMERGENCY DEPT VISIT HI MDM: CPT

## 2021-11-19 RX ORDER — ASPIRIN 325 MG
325 TABLET ORAL ONCE
Status: COMPLETED | OUTPATIENT
Start: 2021-11-19 | End: 2021-11-19

## 2021-11-19 RX ORDER — GABAPENTIN 300 MG/1
300 CAPSULE ORAL 3 TIMES DAILY PRN
Qty: 60 CAPSULE | Refills: 0 | Status: SHIPPED | OUTPATIENT
Start: 2021-11-19 | End: 2022-07-10

## 2021-11-19 RX ORDER — LORAZEPAM 2 MG/ML
0.5 INJECTION INTRAMUSCULAR ONCE
Status: DISCONTINUED | OUTPATIENT
Start: 2021-11-19 | End: 2021-11-19

## 2021-11-19 RX ORDER — PREGABALIN 25 MG/1
75 CAPSULE ORAL ONCE
Status: COMPLETED | OUTPATIENT
Start: 2021-11-19 | End: 2021-11-19

## 2021-11-19 RX ORDER — LORAZEPAM 2 MG/ML
0.5 INJECTION INTRAMUSCULAR ONCE
Status: COMPLETED | OUTPATIENT
Start: 2021-11-19 | End: 2021-11-19

## 2021-11-19 RX ORDER — DIAZEPAM 5 MG/1
5 TABLET ORAL ONCE
Status: COMPLETED | OUTPATIENT
Start: 2021-11-19 | End: 2021-11-19

## 2021-11-19 RX ORDER — OXYCODONE AND ACETAMINOPHEN 10; 325 MG/1; MG/1
1 TABLET ORAL EVERY 6 HOURS PRN
Status: SHIPPED | COMMUNITY
End: 2022-07-10

## 2021-11-19 RX ORDER — SODIUM CHLORIDE 9 MG/ML
500 INJECTION, SOLUTION INTRAVENOUS ONCE
Status: COMPLETED | OUTPATIENT
Start: 2021-11-19 | End: 2021-11-19

## 2021-11-19 RX ADMIN — FENTANYL CITRATE 100 MCG: 50 INJECTION, SOLUTION INTRAMUSCULAR; INTRAVENOUS at 03:45

## 2021-11-19 RX ADMIN — DIAZEPAM 5 MG: 5 TABLET ORAL at 02:30

## 2021-11-19 RX ADMIN — PREGABALIN 75 MG: 25 CAPSULE ORAL at 01:30

## 2021-11-19 RX ADMIN — SODIUM CHLORIDE 500 ML: 9 INJECTION, SOLUTION INTRAVENOUS at 02:45

## 2021-11-19 RX ADMIN — LORAZEPAM 0.5 MG: 2 INJECTION INTRAMUSCULAR; INTRAVENOUS at 01:24

## 2021-11-19 RX ADMIN — ASPIRIN 325 MG ORAL TABLET 325 MG: 325 PILL ORAL at 02:47

## 2021-11-19 ASSESSMENT — FIBROSIS 4 INDEX: FIB4 SCORE: 1.6

## 2021-11-19 NOTE — ED NOTES
Patient still restless in bed. Patient is awake but fall asleep easily. Blood collected and sent to lab for troponin.

## 2021-11-19 NOTE — ED TRIAGE NOTES
"Chief Complaint   Patient presents with   • Chest Pain     Patient states, \"It started late last night. I have pain in the center of my chest that travels through to my back.\"        /74   Pulse 73   Temp 36.3 °C (97.3 °F) (Temporal)   Resp 18   Ht 1.676 m (5' 6\")   Wt 54.8 kg (120 lb 13 oz)   SpO2 99%     "

## 2021-11-19 NOTE — ED NOTES
Patient in bed resting quietly. No complaints at this time. NAD noted. Calm and cooperative. No respiratory distress noted. Patient states that she is feeling better.

## 2021-11-19 NOTE — ED NOTES
"Patient reported to nurse that she remembered that she had an adverse reaction to ativan in the past that made her legs feel \"jumpy\" and made her restless leg worst. Patient states she feels like this is occurring right now. MD notified.   "

## 2021-11-19 NOTE — ED PROVIDER NOTES
"ED Provider Note     11/19/2021  12:37 AM    Means of Arrival: Walk In  History obtained by: patient  Limitations: None  PCP: Jarrell Yates  CODE STATUS: Full    CHIEF COMPLAINT  Chief Complaint   Patient presents with   • Chest Pain     Patient states, \"It started late last night. I have pain in the center of my chest that travels through to my back.\"        [facility]  Anu Manuel is a 66 y.o. female with history of liver failure of unknown etiology, hld, htn, DM1 who presents with concerns of chest pain. She says she has been very uncomfortable for the past 48 hours. She was having significant restless leg syndrome. She is also having abdominal pains that have been chronic recently. For the past few months she has developed progressive signs of liver failure of unknown etiology. No improvement following cholecystectomy 3 weeks ago. Abdominal pain has become chronic. She has had several ER visits and hospitalizations over the past few months. Last night she started to have chest discomfort. She spoke with her pain provider and at that time thought was that the pain is likely referred. No history of MI or other cardiac problems. The pain has persisted. She is quite anxious and worried something else is wrong. No shortness of breath. Pain is at the mid chest and she also has concomitant epigastric right upper quadrant abdominal pain. Her other major concern is restless legs. This has been ongoing for 48 hours. She cannot sleep. She does not take any medication for restless leg syndrome.    She has ongoing workup for liver failure, awaiting appointment at [facility].     REVIEW OF SYSTEMS  Review of Systems   All other systems reviewed and are negative.    See HPI for further details.    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    SOCIAL HISTORY  Social History     Tobacco Use   • Smoking status: Current Every Day Smoker     Packs/day: 0.50     Years: 30.00     Pack years: 15.00     Types: Cigarettes   • Smokeless tobacco: Never Used   Vaping Use   • Vaping Use: Never used   Substance and Sexual Activity   • Alcohol use: Not Currently   • Drug use: Yes     Comment: Marijuana   • Sexual activity: Not on file       SURGICAL HISTORY   has a past surgical history that includes hysterectomy, vaginal (2006); thyroid lobectomy (1973); lumpectomy (1976, 2005); cervical disk and fusion anterior (03/12/08); tonsillectomy (1963); cervical fusion posterior (1/16/2009); hardware removal neuro (1/16/2009); neck exploration (1/16/2009); abdominal hysterectomy total; lumpectomy; lumbar laminectomy diskectomy (Right, 5/10/2016); shoulder decompression arthroscopic (6/17/08); clavicle distal excision (6/17/08); shoulder arthroscopy w/ rotator cuff repair (10/9/08); njx aa&/strd tfrml epi lumbar/sacral 1 level (Right, 8/31/2016); spinal cord stimulator (N/A, 10/26/2018); thoracic laminectomy (N/A, 10/26/2018); appendectomy (2004); implant neurostim/ (N/A, 12/16/2019); irrigation & debridement neuro (1/19/2020); cath placement (1/25/2020); ercp,diagnostic (N/A, 10/26/2021); upper gi endoscopy,biopsy (N/A, 10/26/2021); upper gi endoscopy,diagnosis (N/A, 10/26/2021); and peter by laparoscopy (N/A, 10/28/2021).    CURRENT MEDICATIONS  Home Medications     Reviewed by Kevin Ho R.N. (Registered Nurse) on 11/19/21 at 0142  Med List Status: Not Addressed  "  Medication Last Dose Status   Ascorbic Acid (VITAMIN C PO)  Active   aspirin 81 MG EC tablet  Active   B Complex Vitamins (VITAMIN B COMPLEX PO)  Active   Continuous Blood Gluc Sensor (FREESTYLE PARISA 14 DAY SENSOR) Misc  Active   Docusate Calcium (STOOL SOFTENER PO)  Active   furosemide (LASIX) 40 MG Tab  Active   insulin aspart (NOVOLOG FLEXPEN) 100 UNIT/ML injection PEN  Active   levothyroxine (SYNTHROID) 25 MCG Tab  Active   metoprolol (TOPROL-XL) 200 MG XL tablet  Active   NARCAN 4 MG/0.1ML Liquid  Active   ondansetron (ZOFRAN ODT) 4 MG TABLET DISPERSIBLE  Active   pantoprazole (PROTONIX) 40 MG Tablet Delayed Response  Active   pyridoxine (VITAMIN B-6) 50 MG Tab  Active   sertraline (ZOLOFT) 100 MG Tab  Active   spironolactone (ALDACTONE) 25 MG Tab  Active   SYNTHROID 200 MCG Tab  Active   traZODone (DESYREL) 100 MG Tab  Active   TRESIBA FLEXTOUCH 100 UNIT/ML Solution Pen-injector  Active   ursodiol (ACTIGALL) 300 MG Cap  Active   vitamin D3 (QC VITAMIN D3) 5000 Unit (125 mcg) Tab  Active                ALLERGIES  Allergies   Allergen Reactions   • Ativan Unspecified     Patient reports that she had ativan removed in the past. Reports leg jumping.    • Tape Unspecified     Blisters, paper tape is ok       PHYSICAL EXAM  VITAL SIGNS: /78   Pulse 62   Temp 36.9 °C (98.5 °F) (Temporal)   Resp 14   Ht 1.676 m (5' 6\")   Wt 54.8 kg (120 lb 13 oz)   LMP 04/29/2005 (LMP Unknown)   SpO2 98%   BMI 19.50 kg/m²     Constitutional: Alert in no apparent distress.  HENT: No signs of trauma, Bilateral external ears normal, Nose normal.   Eyes: Pupils are equal, Conjunctiva normal   Neck: Normal range of motion, No tenderness, Supple, No stridor. No JVD  Cardiovascular: Regular rate and rhythm, no murmurs. Symmetric distal pulses. No cyanosis of extremities. No peripheral edema of extremities.  Thorax & Lungs: Normal breath sounds, No respiratory distress, No wheezing, No chest tenderness.   Abdomen: Soft, " discomfort with palpation at the upper abdomen but no peritoneal signs. No masses, No pulsatile masses.   Skin: Warm, Dry, No erythema, No rash. Jaundiced  Back: No midline bony tenderness, No CVA tenderness.   Musculoskeletal: Good range of motion in all major joints. No tenderness to palpation or major deformities noted.   Neurologic: Alert , Normal motor function, Normal sensory function, No focal deficits noted. Frequently moving lower extremities. No asterixis.  Psychiatric: Affect normal, Judgment normal, Mood normal.   Physical Exam      DIAGNOSTIC STUDIES / PROCEDURES    EKG  Results for orders placed or performed during the hospital encounter of 21   EKG   Result Value Ref Range    Report       [facility] Emergency Dept[facility]    Test Date:  2021  Pt Name:    KALPANA BUTTS               Department: [facility]  MRN:        [id number]                      Room:  Gender:     Female                       Technician: OLAYINKA  :        [id number]                   Requested By:ER TRIAGE PROTOCOL  Order #:    [id number]                    Reading MD: Davin Tobar II, MD    Measurements  Intervals                                Axis  Rate:       70                           P:          72  TX:         132                          QRS:        39  QRSD:       92                           T:          38  QT:         392  QTc:        423    Interpretive Statements  SINUS RHYTHM  Rate 70  Normal intervals  No T wave abnormalities. No ST elevation or depression.  Impression: Normal sinus rhythm EKG without signs of ischemia  Compared to ECG 2021 08:31:01  No significant changes  Electronically Signed On 2021 1:09:37 PST by Davin Tobar II, MD           LABS  Pertinent Labs & Imaging studies reviewed. (See chart for details)    RADIOLOGY  Pertinent Labs & Imaging studies reviewed. (See chart for details)    COURSE & MEDICAL DECISION MAKING  Pertinent Labs & Imaging studies  reviewed. (See chart for details)    12:37 AM This is an emergent evaluation of a  66 y.o. female with type 1 diabetes, progressive signs of liver failure over the past few months who presents with concerns of chest pain. She also has secondary concerns of restless leg and anxiety. Physical exam unchanged from previous. EKG without signs of ischemia. The considered diagnosis include but not limited to emergent causes of chest pain such as MI, PE, pneumothorax, dissection, pneumonia, pericarditis. Also considering less emergent causes such as musculoskeletal pain, radiculopathy, costochondritis. I believe the true cause of her pain likely is referred pain there may be an anxiety component. However she does have risk factors for MI including type 1 diabetes and a cardiac work-up will be pursued. Ordered EKG, CXR, cbc, cmp, troponin to evaluate. She will be given low-dose Ativan for anxiety. She will be given pregabalin for restless leg.    I have ordered continuous pulse ox and cardiac monitoring. Pulse ox shows a normal wave form with normal saturations >95%. Cardiac monitors show a normal sinus rhythm without ectopy.       HEART SCORE=3      3:21 AM  WBC 12.2, near previous labs. Na 127 slightly lower than previous. Will give NS 500cc bolus. Glucose also elevated at 226, fluids should help bring this down. LFTs abnormal as previous but bilirubin improved to 5.3. Troponin 29 and has been elevated to similar levels in the past. Repeat troponin pending.  Unfortunately she continues to have concerns of restless legs. Minimal improvement with pregabalin, ativan, valium. She is requesting to leave because she cannot get comfortable. Chest pain improved.     05:30 AM  She slept for a couple hours and when she awoke symptoms  resolved. Repeat troponin 26. No longer with chest pain. Restless leg symptoms resolved. She does take sertraline, and this can cause or worsen restless leg syndrome. She plans to discuss this with her  psychiatrist. Discharged in stable condition.     The patient will return for worsening symptoms and is stable at the time of discharge. The patient verbalizes understanding. Guidance was provided on appropriate use of medications including driving under the influence, overdose, and side effects.         FINAL IMPRESSION    ICD-10-CM   1. Chest pain, unspecified type  R07.9   2. Restless legs  G25.81          This dictation was created using voice recognition software. The accuracy of the dictation is limited to the abilities of the software. I expect there may be some errors of grammar and possibly content. The nursing notes were reviewed and certain aspects of this information were incorporated into this note.    Electronically signed by: Davin Tobar II, M.D., 11/19/2021 12:37 AM

## 2021-11-24 ENCOUNTER — HOSPITAL ENCOUNTER (OUTPATIENT)
Facility: MEDICAL CENTER | Age: 66
End: 2021-11-27
Attending: EMERGENCY MEDICINE | Admitting: STUDENT IN AN ORGANIZED HEALTH CARE EDUCATION/TRAINING PROGRAM
Payer: MEDICARE

## 2021-11-24 ENCOUNTER — APPOINTMENT (OUTPATIENT)
Dept: RADIOLOGY | Facility: MEDICAL CENTER | Age: 66
End: 2021-11-24
Attending: EMERGENCY MEDICINE
Payer: MEDICARE

## 2021-11-24 DIAGNOSIS — K59.03 DRUG-INDUCED CONSTIPATION: ICD-10-CM

## 2021-11-24 DIAGNOSIS — R10.84 GENERALIZED ABDOMINAL PAIN: ICD-10-CM

## 2021-11-24 DIAGNOSIS — R10.9 INTRACTABLE ABDOMINAL PAIN: ICD-10-CM

## 2021-11-24 DIAGNOSIS — R79.89 ELEVATED LIVER FUNCTION TESTS: ICD-10-CM

## 2021-11-24 PROBLEM — R52 INTRACTABLE PAIN: Status: ACTIVE | Noted: 2021-10-26

## 2021-11-24 LAB
ALBUMIN SERPL BCP-MCNC: 2.7 G/DL (ref 3.2–4.9)
ALBUMIN/GLOB SERPL: 0.7 G/DL
ALP SERPL-CCNC: 1979 U/L (ref 30–99)
ALT SERPL-CCNC: 88 U/L (ref 2–50)
ANION GAP SERPL CALC-SCNC: 12 MMOL/L (ref 7–16)
APPEARANCE UR: CLEAR
APTT PPP: 26.4 SEC (ref 24.7–36)
AST SERPL-CCNC: 114 U/L (ref 12–45)
B-OH-BUTYR SERPL-MCNC: 0.07 MMOL/L (ref 0.02–0.27)
BACTERIA #/AREA URNS HPF: ABNORMAL /HPF
BASOPHILS # BLD AUTO: 1.6 % (ref 0–1.8)
BASOPHILS # BLD: 0.17 K/UL (ref 0–0.12)
BILIRUB SERPL-MCNC: 4 MG/DL (ref 0.1–1.5)
BILIRUB UR QL STRIP.AUTO: ABNORMAL
BUN SERPL-MCNC: 23 MG/DL (ref 8–22)
CALCIUM SERPL-MCNC: 8.7 MG/DL (ref 8.4–10.2)
CHLORIDE SERPL-SCNC: 92 MMOL/L (ref 96–112)
CO2 SERPL-SCNC: 25 MMOL/L (ref 20–33)
COLOR UR: ABNORMAL
CREAT SERPL-MCNC: 0.9 MG/DL (ref 0.5–1.4)
EKG IMPRESSION: NORMAL
EOSINOPHIL # BLD AUTO: 0.46 K/UL (ref 0–0.51)
EOSINOPHIL NFR BLD: 4.2 % (ref 0–6.9)
EPI CELLS #/AREA URNS HPF: ABNORMAL /HPF
ERYTHROCYTE [DISTWIDTH] IN BLOOD BY AUTOMATED COUNT: 53.2 FL (ref 35.9–50)
GLOBULIN SER CALC-MCNC: 3.9 G/DL (ref 1.9–3.5)
GLUCOSE BLD-MCNC: 113 MG/DL (ref 65–99)
GLUCOSE SERPL-MCNC: 163 MG/DL (ref 65–99)
GLUCOSE UR STRIP.AUTO-MCNC: NEGATIVE MG/DL
HCT VFR BLD AUTO: 32.1 % (ref 37–47)
HGB BLD-MCNC: 10.2 G/DL (ref 12–16)
HYALINE CASTS #/AREA URNS LPF: ABNORMAL /LPF
IMM GRANULOCYTES # BLD AUTO: 0.07 K/UL (ref 0–0.11)
IMM GRANULOCYTES NFR BLD AUTO: 0.6 % (ref 0–0.9)
INR PPP: 0.85 (ref 0.87–1.13)
KETONES UR STRIP.AUTO-MCNC: NEGATIVE MG/DL
LACTATE BLD-SCNC: 1.3 MMOL/L (ref 0.5–2)
LEUKOCYTE ESTERASE UR QL STRIP.AUTO: NEGATIVE
LIPASE SERPL-CCNC: 7 U/L (ref 7–58)
LYMPHOCYTES # BLD AUTO: 1.27 K/UL (ref 1–4.8)
LYMPHOCYTES NFR BLD: 11.6 % (ref 22–41)
MCH RBC QN AUTO: 31.5 PG (ref 27–33)
MCHC RBC AUTO-ENTMCNC: 31.8 G/DL (ref 33.6–35)
MCV RBC AUTO: 99.1 FL (ref 81.4–97.8)
MICRO URNS: ABNORMAL
MONOCYTES # BLD AUTO: 1.05 K/UL (ref 0–0.85)
MONOCYTES NFR BLD AUTO: 9.6 % (ref 0–13.4)
MUCOUS THREADS #/AREA URNS HPF: ABNORMAL /HPF
NEUTROPHILS # BLD AUTO: 7.91 K/UL (ref 2–7.15)
NEUTROPHILS NFR BLD: 72.4 % (ref 44–72)
NITRITE UR QL STRIP.AUTO: NEGATIVE
NRBC # BLD AUTO: 0 K/UL
NRBC BLD-RTO: 0 /100 WBC
PH UR STRIP.AUTO: 6 [PH] (ref 5–8)
PLATELET # BLD AUTO: 426 K/UL (ref 164–446)
PMV BLD AUTO: 10.8 FL (ref 9–12.9)
POTASSIUM SERPL-SCNC: 4 MMOL/L (ref 3.6–5.5)
PROCALCITONIN SERPL-MCNC: 0.5 NG/ML
PROT SERPL-MCNC: 6.6 G/DL (ref 6–8.2)
PROT UR QL STRIP: 100 MG/DL
PROTHROMBIN TIME: 10.9 SEC (ref 12–14.6)
RBC # BLD AUTO: 3.24 M/UL (ref 4.2–5.4)
RBC # URNS HPF: ABNORMAL /HPF
RBC UR QL AUTO: ABNORMAL
SODIUM SERPL-SCNC: 129 MMOL/L (ref 135–145)
SP GR UR STRIP.AUTO: 1.02
UNIDENT CRYS URNS QL MICRO: ABNORMAL /HPF
WBC # BLD AUTO: 10.9 K/UL (ref 4.8–10.8)
WBC #/AREA URNS HPF: ABNORMAL /HPF

## 2021-11-24 PROCEDURE — 74177 CT ABD & PELVIS W/CONTRAST: CPT | Mod: ME

## 2021-11-24 PROCEDURE — G0378 HOSPITAL OBSERVATION PER HR: HCPCS

## 2021-11-24 PROCEDURE — 700111 HCHG RX REV CODE 636 W/ 250 OVERRIDE (IP): Performed by: STUDENT IN AN ORGANIZED HEALTH CARE EDUCATION/TRAINING PROGRAM

## 2021-11-24 PROCEDURE — 700111 HCHG RX REV CODE 636 W/ 250 OVERRIDE (IP): Performed by: EMERGENCY MEDICINE

## 2021-11-24 PROCEDURE — 36415 COLL VENOUS BLD VENIPUNCTURE: CPT

## 2021-11-24 PROCEDURE — 96376 TX/PRO/DX INJ SAME DRUG ADON: CPT

## 2021-11-24 PROCEDURE — 94760 N-INVAS EAR/PLS OXIMETRY 1: CPT

## 2021-11-24 PROCEDURE — 700102 HCHG RX REV CODE 250 W/ 637 OVERRIDE(OP): Performed by: STUDENT IN AN ORGANIZED HEALTH CARE EDUCATION/TRAINING PROGRAM

## 2021-11-24 PROCEDURE — A9270 NON-COVERED ITEM OR SERVICE: HCPCS | Performed by: STUDENT IN AN ORGANIZED HEALTH CARE EDUCATION/TRAINING PROGRAM

## 2021-11-24 PROCEDURE — 83690 ASSAY OF LIPASE: CPT

## 2021-11-24 PROCEDURE — 81001 URINALYSIS AUTO W/SCOPE: CPT

## 2021-11-24 PROCEDURE — 82962 GLUCOSE BLOOD TEST: CPT

## 2021-11-24 PROCEDURE — 80053 COMPREHEN METABOLIC PANEL: CPT

## 2021-11-24 PROCEDURE — 83605 ASSAY OF LACTIC ACID: CPT

## 2021-11-24 PROCEDURE — 700117 HCHG RX CONTRAST REV CODE 255: Performed by: EMERGENCY MEDICINE

## 2021-11-24 PROCEDURE — 700105 HCHG RX REV CODE 258: Performed by: EMERGENCY MEDICINE

## 2021-11-24 PROCEDURE — 96374 THER/PROPH/DIAG INJ IV PUSH: CPT

## 2021-11-24 PROCEDURE — 96372 THER/PROPH/DIAG INJ SC/IM: CPT | Mod: XU

## 2021-11-24 PROCEDURE — 700102 HCHG RX REV CODE 250 W/ 637 OVERRIDE(OP): Performed by: EMERGENCY MEDICINE

## 2021-11-24 PROCEDURE — 99285 EMERGENCY DEPT VISIT HI MDM: CPT

## 2021-11-24 PROCEDURE — 85730 THROMBOPLASTIN TIME PARTIAL: CPT

## 2021-11-24 PROCEDURE — 85610 PROTHROMBIN TIME: CPT

## 2021-11-24 PROCEDURE — 700105 HCHG RX REV CODE 258: Performed by: STUDENT IN AN ORGANIZED HEALTH CARE EDUCATION/TRAINING PROGRAM

## 2021-11-24 PROCEDURE — 96375 TX/PRO/DX INJ NEW DRUG ADDON: CPT

## 2021-11-24 PROCEDURE — 82010 KETONE BODYS QUAN: CPT

## 2021-11-24 PROCEDURE — 85025 COMPLETE CBC W/AUTO DIFF WBC: CPT

## 2021-11-24 PROCEDURE — 84145 PROCALCITONIN (PCT): CPT

## 2021-11-24 PROCEDURE — 99220 PR INITIAL OBSERVATION CARE,LEVL III: CPT | Performed by: STUDENT IN AN ORGANIZED HEALTH CARE EDUCATION/TRAINING PROGRAM

## 2021-11-24 PROCEDURE — 93005 ELECTROCARDIOGRAM TRACING: CPT | Performed by: EMERGENCY MEDICINE

## 2021-11-24 RX ORDER — HYDROMORPHONE HYDROCHLORIDE 1 MG/ML
0.25 INJECTION, SOLUTION INTRAMUSCULAR; INTRAVENOUS; SUBCUTANEOUS
Status: DISCONTINUED | OUTPATIENT
Start: 2021-11-24 | End: 2021-11-24

## 2021-11-24 RX ORDER — ONDANSETRON 2 MG/ML
4 INJECTION INTRAMUSCULAR; INTRAVENOUS ONCE
Status: COMPLETED | OUTPATIENT
Start: 2021-11-24 | End: 2021-11-24

## 2021-11-24 RX ORDER — OXYCODONE HYDROCHLORIDE 5 MG/1
2.5 TABLET ORAL
Status: DISCONTINUED | OUTPATIENT
Start: 2021-11-24 | End: 2021-11-24

## 2021-11-24 RX ORDER — MORPHINE SULFATE 4 MG/ML
4 INJECTION INTRAVENOUS ONCE
Status: COMPLETED | OUTPATIENT
Start: 2021-11-24 | End: 2021-11-24

## 2021-11-24 RX ORDER — GABAPENTIN 300 MG/1
300 CAPSULE ORAL 3 TIMES DAILY PRN
Status: DISCONTINUED | OUTPATIENT
Start: 2021-11-24 | End: 2021-11-27 | Stop reason: HOSPADM

## 2021-11-24 RX ORDER — FUROSEMIDE 40 MG/1
40 TABLET ORAL DAILY
Status: DISCONTINUED | OUTPATIENT
Start: 2021-11-24 | End: 2021-11-27 | Stop reason: HOSPADM

## 2021-11-24 RX ORDER — OXYCODONE HYDROCHLORIDE 10 MG/1
10 TABLET ORAL
Status: DISCONTINUED | OUTPATIENT
Start: 2021-11-24 | End: 2021-11-24

## 2021-11-24 RX ORDER — OXYCODONE HYDROCHLORIDE 10 MG/1
10 TABLET ORAL
Status: DISCONTINUED | OUTPATIENT
Start: 2021-11-24 | End: 2021-11-25

## 2021-11-24 RX ORDER — SODIUM CHLORIDE, SODIUM LACTATE, POTASSIUM CHLORIDE, CALCIUM CHLORIDE 600; 310; 30; 20 MG/100ML; MG/100ML; MG/100ML; MG/100ML
INJECTION, SOLUTION INTRAVENOUS CONTINUOUS
Status: DISCONTINUED | OUTPATIENT
Start: 2021-11-24 | End: 2021-11-27

## 2021-11-24 RX ORDER — OXYCODONE HYDROCHLORIDE 5 MG/1
5 TABLET ORAL
Status: DISCONTINUED | OUTPATIENT
Start: 2021-11-24 | End: 2021-11-25

## 2021-11-24 RX ORDER — TRAZODONE HYDROCHLORIDE 50 MG/1
100 TABLET ORAL
Status: DISCONTINUED | OUTPATIENT
Start: 2021-11-24 | End: 2021-11-27 | Stop reason: HOSPADM

## 2021-11-24 RX ORDER — SPIRONOLACTONE 25 MG/1
25 TABLET ORAL DAILY
Status: DISCONTINUED | OUTPATIENT
Start: 2021-11-24 | End: 2021-11-27 | Stop reason: HOSPADM

## 2021-11-24 RX ORDER — LEVOTHYROXINE SODIUM 0.1 MG/1
200 TABLET ORAL EVERY MORNING
Status: DISCONTINUED | OUTPATIENT
Start: 2021-11-24 | End: 2021-11-27 | Stop reason: HOSPADM

## 2021-11-24 RX ORDER — INSULIN LISPRO 100 [IU]/ML
1-6 INJECTION, SOLUTION INTRAVENOUS; SUBCUTANEOUS EVERY 6 HOURS
Status: DISCONTINUED | OUTPATIENT
Start: 2021-11-24 | End: 2021-11-27 | Stop reason: HOSPADM

## 2021-11-24 RX ORDER — OXYCODONE HYDROCHLORIDE 5 MG/1
5 TABLET ORAL
Status: DISCONTINUED | OUTPATIENT
Start: 2021-11-24 | End: 2021-11-24

## 2021-11-24 RX ORDER — CHOLECALCIFEROL (VITAMIN D3) 125 MCG
10 CAPSULE ORAL NIGHTLY
Status: DISCONTINUED | OUTPATIENT
Start: 2021-11-24 | End: 2021-11-27 | Stop reason: HOSPADM

## 2021-11-24 RX ORDER — HYDROMORPHONE HYDROCHLORIDE 1 MG/ML
0.25 INJECTION, SOLUTION INTRAMUSCULAR; INTRAVENOUS; SUBCUTANEOUS
Status: DISCONTINUED | OUTPATIENT
Start: 2021-11-24 | End: 2021-11-25

## 2021-11-24 RX ORDER — METOPROLOL SUCCINATE 100 MG/1
200 TABLET, EXTENDED RELEASE ORAL DAILY
Status: DISCONTINUED | OUTPATIENT
Start: 2021-11-24 | End: 2021-11-27 | Stop reason: HOSPADM

## 2021-11-24 RX ORDER — BISACODYL 10 MG
10 SUPPOSITORY, RECTAL RECTAL
Status: DISCONTINUED | OUTPATIENT
Start: 2021-11-24 | End: 2021-11-27 | Stop reason: HOSPADM

## 2021-11-24 RX ORDER — PYRIDOXINE HCL (VITAMIN B6) 50 MG
50 TABLET ORAL EVERY MORNING
Status: DISCONTINUED | OUTPATIENT
Start: 2021-11-24 | End: 2021-11-27 | Stop reason: HOSPADM

## 2021-11-24 RX ORDER — SODIUM CHLORIDE 9 MG/ML
INJECTION, SOLUTION INTRAVENOUS CONTINUOUS
Status: DISCONTINUED | OUTPATIENT
Start: 2021-11-24 | End: 2021-11-27

## 2021-11-24 RX ORDER — POLYETHYLENE GLYCOL 3350 17 G/17G
1 POWDER, FOR SOLUTION ORAL
Status: DISCONTINUED | OUTPATIENT
Start: 2021-11-24 | End: 2021-11-27 | Stop reason: HOSPADM

## 2021-11-24 RX ORDER — URSODIOL 300 MG/1
300 CAPSULE ORAL DAILY
Status: DISCONTINUED | OUTPATIENT
Start: 2021-11-24 | End: 2021-11-27 | Stop reason: HOSPADM

## 2021-11-24 RX ORDER — AMOXICILLIN 250 MG
2 CAPSULE ORAL 2 TIMES DAILY
Status: DISCONTINUED | OUTPATIENT
Start: 2021-11-24 | End: 2021-11-27 | Stop reason: HOSPADM

## 2021-11-24 RX ORDER — DEXTROSE MONOHYDRATE 25 G/50ML
50 INJECTION, SOLUTION INTRAVENOUS
Status: DISCONTINUED | OUTPATIENT
Start: 2021-11-24 | End: 2021-11-27 | Stop reason: HOSPADM

## 2021-11-24 RX ORDER — LEVOTHYROXINE SODIUM 0.03 MG/1
25 TABLET ORAL
Status: DISCONTINUED | OUTPATIENT
Start: 2021-11-24 | End: 2021-11-27 | Stop reason: HOSPADM

## 2021-11-24 RX ORDER — VITAMIN B COMPLEX
5000 TABLET ORAL 2 TIMES DAILY
Status: DISCONTINUED | OUTPATIENT
Start: 2021-11-24 | End: 2021-11-27 | Stop reason: HOSPADM

## 2021-11-24 RX ORDER — OMEPRAZOLE 20 MG/1
20 CAPSULE, DELAYED RELEASE ORAL 2 TIMES DAILY
Status: DISCONTINUED | OUTPATIENT
Start: 2021-11-24 | End: 2021-11-27 | Stop reason: HOSPADM

## 2021-11-24 RX ADMIN — Medication 5000 UNITS: at 17:03

## 2021-11-24 RX ADMIN — INSULIN GLARGINE 23 UNITS: 100 INJECTION, SOLUTION SUBCUTANEOUS at 17:09

## 2021-11-24 RX ADMIN — SODIUM CHLORIDE: 9 INJECTION, SOLUTION INTRAVENOUS at 11:44

## 2021-11-24 RX ADMIN — HYDROMORPHONE HYDROCHLORIDE 0.25 MG: 1 INJECTION, SOLUTION INTRAMUSCULAR; INTRAVENOUS; SUBCUTANEOUS at 23:27

## 2021-11-24 RX ADMIN — URSODIOL 300 MG: 300 CAPSULE ORAL at 16:57

## 2021-11-24 RX ADMIN — MORPHINE SULFATE 4 MG: 4 INJECTION INTRAVENOUS at 12:46

## 2021-11-24 RX ADMIN — OXYCODONE 5 MG: 5 TABLET ORAL at 19:15

## 2021-11-24 RX ADMIN — OMEPRAZOLE 20 MG: 20 CAPSULE, DELAYED RELEASE ORAL at 17:04

## 2021-11-24 RX ADMIN — MORPHINE SULFATE 4 MG: 4 INJECTION INTRAVENOUS at 11:43

## 2021-11-24 RX ADMIN — SENNOSIDES AND DOCUSATE SODIUM 2 TABLET: 50; 8.6 TABLET ORAL at 17:04

## 2021-11-24 RX ADMIN — METOPROLOL SUCCINATE 200 MG: 100 TABLET, EXTENDED RELEASE ORAL at 17:35

## 2021-11-24 RX ADMIN — FUROSEMIDE 40 MG: 40 TABLET ORAL at 17:03

## 2021-11-24 RX ADMIN — IOHEXOL 25 ML: 240 INJECTION, SOLUTION INTRATHECAL; INTRAVASCULAR; INTRAVENOUS; ORAL at 13:07

## 2021-11-24 RX ADMIN — HYDROMORPHONE HYDROCHLORIDE 0.25 MG: 1 INJECTION, SOLUTION INTRAMUSCULAR; INTRAVENOUS; SUBCUTANEOUS at 20:26

## 2021-11-24 RX ADMIN — IOHEXOL 100 ML: 350 INJECTION, SOLUTION INTRAVENOUS at 13:06

## 2021-11-24 RX ADMIN — PYRIDOXINE HCL TAB 50 MG 50 MG: 50 TAB at 16:56

## 2021-11-24 RX ADMIN — Medication 10 MG: at 20:28

## 2021-11-24 RX ADMIN — ONDANSETRON 4 MG: 2 INJECTION INTRAMUSCULAR; INTRAVENOUS at 11:43

## 2021-11-24 RX ADMIN — SERTRALINE HYDROCHLORIDE 100 MG: 50 TABLET ORAL at 20:28

## 2021-11-24 RX ADMIN — HYDROMORPHONE HYDROCHLORIDE 0.25 MG: 1 INJECTION, SOLUTION INTRAMUSCULAR; INTRAVENOUS; SUBCUTANEOUS at 17:05

## 2021-11-24 RX ADMIN — SODIUM CHLORIDE, POTASSIUM CHLORIDE, SODIUM LACTATE AND CALCIUM CHLORIDE: 600; 310; 30; 20 INJECTION, SOLUTION INTRAVENOUS at 16:45

## 2021-11-24 RX ADMIN — INSULIN LISPRO 3 UNITS: 100 INJECTION, SOLUTION INTRAVENOUS; SUBCUTANEOUS at 23:44

## 2021-11-24 RX ADMIN — OXYCODONE 5 MG: 5 TABLET ORAL at 15:52

## 2021-11-24 RX ADMIN — SPIRONOLACTONE 25 MG: 25 TABLET ORAL at 16:57

## 2021-11-24 RX ADMIN — OXYCODONE HYDROCHLORIDE 10 MG: 10 TABLET ORAL at 22:19

## 2021-11-24 RX ADMIN — ENOXAPARIN SODIUM 40 MG: 40 INJECTION SUBCUTANEOUS at 17:35

## 2021-11-24 RX ADMIN — ONDANSETRON 4 MG: 2 INJECTION INTRAMUSCULAR; INTRAVENOUS at 12:47

## 2021-11-24 ASSESSMENT — COGNITIVE AND FUNCTIONAL STATUS - GENERAL
SUGGESTED CMS G CODE MODIFIER DAILY ACTIVITY: CH
MOBILITY SCORE: 24
DAILY ACTIVITIY SCORE: 24
SUGGESTED CMS G CODE MODIFIER MOBILITY: CH

## 2021-11-24 ASSESSMENT — ENCOUNTER SYMPTOMS
CARDIOVASCULAR NEGATIVE: 1
RESPIRATORY NEGATIVE: 1
PSYCHIATRIC NEGATIVE: 1
SEIZURES: 1
EYES NEGATIVE: 1
BLOOD IN STOOL: 0
NAUSEA: 1
ABDOMINAL PAIN: 1
MUSCULOSKELETAL NEGATIVE: 1
HEARTBURN: 1
CONSTIPATION: 1

## 2021-11-24 ASSESSMENT — PATIENT HEALTH QUESTIONNAIRE - PHQ9
2. FEELING DOWN, DEPRESSED, IRRITABLE, OR HOPELESS: NOT AT ALL
1. LITTLE INTEREST OR PLEASURE IN DOING THINGS: NOT AT ALL
SUM OF ALL RESPONSES TO PHQ9 QUESTIONS 1 AND 2: 0

## 2021-11-24 ASSESSMENT — LIFESTYLE VARIABLES
TOTAL SCORE: 0
CONSUMPTION TOTAL: NEGATIVE
HAVE PEOPLE ANNOYED YOU BY CRITICIZING YOUR DRINKING: NO
HAVE YOU EVER FELT YOU SHOULD CUT DOWN ON YOUR DRINKING: NO
AVERAGE NUMBER OF DAYS PER WEEK YOU HAVE A DRINK CONTAINING ALCOHOL: 0
ON A TYPICAL DAY WHEN YOU DRINK ALCOHOL HOW MANY DRINKS DO YOU HAVE: 0
TOTAL SCORE: 0
ALCOHOL_USE: NO
EVER HAD A DRINK FIRST THING IN THE MORNING TO STEADY YOUR NERVES TO GET RID OF A HANGOVER: NO
HOW MANY TIMES IN THE PAST YEAR HAVE YOU HAD 5 OR MORE DRINKS IN A DAY: 0
TOTAL SCORE: 0
EVER FELT BAD OR GUILTY ABOUT YOUR DRINKING: NO

## 2021-11-24 ASSESSMENT — PAIN DESCRIPTION - PAIN TYPE
TYPE: ACUTE PAIN;CHRONIC PAIN
TYPE: ACUTE PAIN
TYPE: ACUTE PAIN;CHRONIC PAIN
TYPE: ACUTE PAIN
TYPE: ACUTE PAIN
TYPE: CHRONIC PAIN
TYPE: ACUTE PAIN

## 2021-11-24 ASSESSMENT — FIBROSIS 4 INDEX: FIB4 SCORE: 1.91

## 2021-11-24 NOTE — ED NOTES
IV established. Blood sent to lab. Urine specimen sent to lab. Medicinal interventions carried out per ERP orders.     Pt instructed to drink contract for CT of the abdomen at 1245.     Call light within reach. Bed in lowest position.

## 2021-11-24 NOTE — ED PROVIDER NOTES
ED Provider Note    CHIEF COMPLAINT  Chief Complaint   Patient presents with   • Abdominal Pain   • Nausea       HPI  Anu Manuel is a 66 y.o. female who presents for evaluation of abdominal pain.  The patient had a recent hospitalization for cholecystectomy with subsequent development of possible abscess formation.  The patient also has had abnormal liver function tests and underwent biopsy.  The patient is scheduled for evaluation at [facility] on 12/5/2021.  Patient presents complaining of abdominal pain which she has had for the last several weeks.  She also feels like she is developing a lump in the left periumbilical area which is new.  Patient denies: Fever, URI symptoms, cardiorespiratory symptoms, vomiting, hematemesis, melena hematochezia, hematuria, dysuria.  Patient has been a type I diabetic since the age of 33.  She states her blood sugars have been very variable reading high and low and she is adjusted her insulin accordingly.  No other acute symptomatology or complaints.  The patient's most recent discharge summary showed:    Admission Date  11/10/2021     CODE STATUS  DNAR/DNI     HPI & HOSPITAL COURSE  Anu Manuel is a 66 y.o. F with chronic liver disease (unclear etiology; cholestatic pattern), DM1 with neuropathy, CAD (s/p 2 stents), hypothyroidism, HTN  who presented 11/10/2021 with c/o acute worsening RUQ abdominal pain radiating to back; associated nausea and vomiting. Pt initially presented [facility] ED - difficult to control pain. Workup at [facility]: her total bilirubin, alk phos, transaminases are markedly elevated although are at baseline for her, white blood cell count 11, and CT with fluid and air in the gallbladder fossa that was stable since prior study. CT findings discussed with GI Dr. Soto who recommended HIDA. Pt was then transferred to [facility]. At [facility], HIDA scan cannot be initially as Pt's received opioids earlier (plus concern about study's validity in the setting of  hyperbilirubin). ERP Dr. Champion discussed the case with GI on call Dr. Blanchard - unclear what other studies or interventions may benefit at this point. Extensive care plan discussed with Pt's daughter (POA), she reported she is interested in making her mother more comfortable; potential for hospice and not sure they want to pursue further diagnostic studies. Medical service was then referred for pain management and further facilitate goals of care.      Per history, Pt has been worked up for chronic liver dysfunction (?idopathc; ?autoimmune) since 8/2021; seen by HERNANDO Traore and [place]. Pt also had a recent admission at [facility] from 10/25-11/2 where she underwent ERCP for cholangitis and choledocholithiasis - sphincterotomy and biliary stent placed. Surgery team also performed lap cholecystectomy before DC (initially required ALBINO drain - negative cultures). Since DC, Pt reportedly continued to have abd pain though mostly manageable. She has visited [facility] ED on 11/7 - CT abd at that time did not show obvious abscess, infection or fluid leak and DC home.      [facility] core biopsy result 10/8/2021 reviewed: differential included mechanical large bile duct obstruction, as well as primary sclerosing cholangitis, AMA negative primary biliary cholangitis and drug-induced cholestatic injury     On admission, I discussed care plan with daughter Farzaneh after her discussion with ERP - she was in agreement with a referral for Hospice. Her goal was to make Pt comfortable and be able to make the planned trip. We did discuss about questionable abscess and GI's recommendation for surgery evaluation which daughter agrees.     General surgery consult appreciated - recommended starting 5 days course of Abx.       Overnight 11/10-11/11, HIDA scan done no evidence of biliary leak; CBD patent. Pt appeared to have delirium overnight. However, during AM rounds, Pt was quite lucid and able to hold a decent conversation. Quite tearful at times but  "agree with the referral for hospice. Making a trip with family is important to her and the family. Pain adequately controlled and tolerated IP diet.      Care plan discussed with GI, can be DC with outpatient follow up with GI consultants, [facility]NANDO Traore or [facility]. MRCP did not show any obstruction (Biliary tree is normal in caliber and there is no choledocholithiasis. Gallbladder is surgically absent with heterogeneous fluid and gas in the gallbladder fossa with differential diagnosis of abscess versus hematoma).     Pt was accepted to [facility] but admission to hospice pending after the trip.      With relative medical stability, a plan to DC discussed.       Therefore, she is discharged in guarded and stable condition to home with close outpatient follow-up.     The patient recovered much more quickly than anticipated on admission.     Discharge Date  11/11/21        REVIEW OF SYSTEMS  See HPI for further details. All other systems negative.    PAST MEDICAL HISTORY  Past Medical History:   Diagnosis Date   • Adverse effect of anesthesia     in 2008 \"throat closes up\"\"anxiety\" ?laryngospasm, kept in ICU. Pt states no problems currently 2018.    • Anesthesia     in 2008 \"throat closes up\"\"anxiety\" ?laryngospasm, kept in ICU. Pt states no problems currently 2018.    • Arthritis     right shoulder, hands   • Cigarette smoker quit 2013   • Dental disorder     missing teeth    • depression 8/30/2016    denies depression, states has anxiety and panic attacks   • Diabetes mellitus type 1 (HCC) 1989    insulin   • Encounter for long-term (current) use of insulin (HCC) 9/25/2013   • Heart burn    • High cholesterol    • Hypertension    • Hypothyroidism, postsurgical 1970   • Indigestion    • Infectious disease      had hepatitis C, tested neg.   • Joint replacement     cervical   • Liver failure (HCC)    • Pain     \"fibromyalgia\";lower back, right leg   • Polyneuropathy in diabetes(357.2) 6/10/2015   • " Status post appendectomy    • Type I (juvenile type) diabetes mellitus with neurological manifestations, uncontrolled(250.63) 6/10/2015       FAMILY HISTORY  Family History   Problem Relation Age of Onset   • Hypertension Mother    • Cancer Father        SOCIAL HISTORY  Resides locally; daughter lives locally    SURGICAL HISTORY  Past Surgical History:   Procedure Laterality Date   • THADDEUS BY LAPAROSCOPY N/A 10/28/2021    Procedure: CHOLECYSTECTOMY, LAPAROSCOPIC;  Surgeon: Tello Trammell M.D.;  Location: [facility];  Service: General   • PB ERCP,DIAGNOSTIC N/A 10/26/2021    Procedure: ERCP (ENDOSCOPIC RETROGRADE CHOLANGIOPANCREATOGRAPHY);  Surgeon: Cr Haro M.D.;  Location: SURGERY SAME DAY [facility];  Service: Gastroenterology   • PB UPPER GI ENDOSCOPY,BIOPSY N/A 10/26/2021    Procedure: GASTROSCOPY, WITH BIOPSY;  Surgeon: Cr Haro M.D.;  Location: SURGERY SAME DAY [facility];  Service: Gastroenterology   • PB UPPER GI ENDOSCOPY,DIAGNOSIS N/A 10/26/2021    Procedure: GASTROSCOPY;  Surgeon: Cr Haro M.D.;  Location: SURGERY SAME DAY [facility];  Service: Gastroenterology   • CATH PLACEMENT  1/25/2020    Procedure: INSERTION, CATHETER PERM;  Surgeon: Rola Mendoza M.D.;  Location: [facility];  Service: General   • IRRIGATION & DEBRIDEMENT NEURO  1/19/2020    Procedure: IRRIGATION AND DEBRIDEMENT, WOUND THORACIC AND LUMBAR;  Surgeon: Ryan Roman M.D.;  Location: [facility];  Service: Neurosurgery   • PB IMPLANT NEUROSTIM/ N/A 12/16/2019    Procedure: EXPLORATION AT THORACIC 8 - 9, REPLACEMENT OF  NEUROSTIMULATOR LEAD;  Surgeon: Ryan Roman M.D.;  Location: [facility];  Service: Neurosurgery   • SPINAL CORD STIMULATOR N/A 10/26/2018    Procedure: SPINAL CORD STIMULATOR;  Surgeon: Ryan Roman M.D.;  Location: [facility];  Service: Neurosurgery   • THORACIC LAMINECTOMY N/A 10/26/2018    Procedure: THORACIC LAMINECTOMY - FOR;   Surgeon: Ryan Roman M.D.;  Location: SURGERY [facility];  Service: Neurosurgery   • WY NJX AA&/STRD TFRML EPI LUMBAR/SACRAL 1 LEVEL Right 8/31/2016    Procedure: INJ-FORAMEN EPI LUM/SAC SNGL L4-5;  Surgeon: Sukhi Godfrey M.D.;  Location: SURGERY [facility];  Service: Pain Management   • LUMBAR LAMINECTOMY DISKECTOMY Right 5/10/2016    Procedure: LUMBAR L4-5 HEMILAMINECTOMY DISKECTOMY ;  Surgeon: Arnold Keyes M.D.;  Location: SURGERY [facility];  Service:    • CERVICAL FUSION POSTERIOR  1/16/2009    Performed by TARA CONTRERAS at [facility]   • HARDWARE REMOVAL NEURO  1/16/2009    Performed by TARA CONTRERAS at [facility]   • NECK EXPLORATION  1/16/2009    Performed by TARA CONTRERAS at [facility]   • SHOULDER ARTHROSCOPY W/ ROTATOR CUFF REPAIR  10/9/08    Performed by SHERLY CASTANEDA at [facility]   • SHOULDER DECOMPRESSION ARTHROSCOPIC  6/17/08    Performed by SHERLY CASTANEDA at [facility]   • CLAVICLE DISTAL EXCISION  6/17/08    Performed by SHERLY CASTANEDA at [facility]   • CERVICAL DISK AND FUSION ANTERIOR  03/12/08   • HYSTERECTOMY, VAGINAL  2006   • APPENDECTOMY  2004   • THYROID LOBECTOMY  1973   • TONSILLECTOMY  1963   • ABDOMINAL HYSTERECTOMY TOTAL     • LUMPECTOMY  1976, 2005    Breast    • LUMPECTOMY         CURRENT MEDICATIONS  Home Medications     Reviewed by Josie Otoole R.N. (Registered Nurse) on 11/24/21 at 1101  Med List Status: Partial   Medication Last Dose Status   Ascorbic Acid (VITAMIN C PO)  Active   aspirin 81 MG EC tablet  Active   B Complex Vitamins (VITAMIN B COMPLEX PO)  Active   Continuous Blood Gluc Sensor (FREESTYLE PARISA 14 DAY SENSOR) Misc  Active   Docusate Calcium (STOOL SOFTENER PO)  Active   furosemide (LASIX) 40 MG Tab  Active   gabapentin (NEURONTIN) 300 MG Cap  Active   insulin aspart (NOVOLOG FLEXPEN) 100 UNIT/ML injection PEN  Active   levothyroxine (SYNTHROID) 25 MCG  "Tab  Active   metoprolol (TOPROL-XL) 200 MG XL tablet  Active   NARCAN 4 MG/0.1ML Liquid  Active   ondansetron (ZOFRAN ODT) 4 MG TABLET DISPERSIBLE  Active   oxyCODONE-acetaminophen (PERCOCET-10)  MG Tab  Active   pantoprazole (PROTONIX) 40 MG Tablet Delayed Response  Active   pyridoxine (VITAMIN B-6) 50 MG Tab  Active   sertraline (ZOLOFT) 100 MG Tab  Active   spironolactone (ALDACTONE) 25 MG Tab  Active   SYNTHROID 200 MCG Tab  Active   traZODone (DESYREL) 100 MG Tab  Active   TRESIBA FLEXTOUCH 100 UNIT/ML Solution Pen-injector  Active   ursodiol (ACTIGALL) 300 MG Cap  Active   vitamin D3 (QC VITAMIN D3) 5000 Unit (125 mcg) Tab  Active                ALLERGIES  Allergies   Allergen Reactions   • Ativan Unspecified     Patient reports that she had ativan removed in the past. Reports leg jumping.    • Tape Unspecified     Blisters, paper tape is ok       PHYSICAL EXAM  VITAL SIGNS: /43   Pulse 78   Temp 36.6 °C (97.8 °F) (Temporal)   Resp 16   Ht 1.676 m (5' 6\")   Wt 56.2 kg (123 lb 14.4 oz)   LMP 04/29/2005 (LMP Unknown)   SpO2 100%   BMI 20.00 kg/m²    Constitutional: 66-year-old female, jaundiced, oriented x3  HENT: ,Atraumatic, Bilateral external ears normal, tympanic membranes clear, Oropharynx mildly dry, No oral exudates, Nose normal.   Eyes: PERRL, EOMI, Conjunctiva normal, No discharge.   Neck: Normal range of motion, No tenderness, Supple, No stridor.   Lymphatic: No lymphadenopathy noted.   Cardiovascular: Normal heart rate, Normal rhythm, No murmurs, No rubs, No gallops.   Thorax & Lungs: Normal Equal breath sounds, No respiratory distress, No wheezing, no stridor, no rales. No chest tenderness.   Abdomen: Surgical scars identified without signs of infection;  diffuse nonlocalized tenderness; nondistended, no organomegaly, positive bowel sounds normal in quality. No guarding or rebound.  Skin: Decreased skin turgor, pink, warm, dry. No rashes, petechiae, purpura. Normal capillary " refill.   Back: No tenderness, No CVA tenderness.   Extremities: Intact distal pulses, No edema, No tenderness, No cyanosis, No clubbing. Vascular: Pulses are 2+, symmetric in the upper and lower extremities.  Musculoskeletal: Good range of motion in all major joints. No tenderness to palpation or major deformities noted.   Neurologic: Alert & oriented x 3, Normal motor function, Normal sensory function, No gross focal deficits noted.   Psychiatric: Affect normal, Judgment normal, Mood normal.       EKG  I have interpreted:    RADIOLOGY/PROCEDURES  CT-ABDOMEN-PELVIS WITH   Final Result      1.  There is a continued mixed density heterogeneous fluid collection with air seen in the gallbladder fossa. Again this is suspicious for abscess.   2.  The peripherally enhancing fluid collection in the anterior abdominal wall midline just deep to the rectus muscle is minimally decreased in size since 11/7/2021 as is the small peripherally enhancing fluid collection adjacent to the lateral segment    left hepatic lobe. These could be intra-abdominal abscesses versus postoperative seromas.   3.  Common bile duct stent is unchanged without biliary dilatation. Minimal pneumobilia is again noted.   4.  Diffuse mesenteric and body wall edema is again noted.            COURSE & MEDICAL DECISION MAKING  Pertinent Labs & Imaging studies reviewed. (See chart for details)  1.  Monitor  2.  IV normal saline  3.  Zofran, titrated  4.  Morphine, titrated    Laboratory studies: CBC shows white count 10.9, 72% neutrophils, 11% lymphocytes, 9% monocytes, 4% eosinophils, hemoglobin 10.2, crit 32.1; CMP shows a sodium of 129, chloride 92, random glucose 163, BUN 23, , ALT 88, alkaline phosphatase 1979, bilirubin 4.0, albumin 2.7, globulin 3.9, otherwise within normal; lipase 7; lactic acid 1.2; INR 0.85, PTT 26; urinalysis positive for bilirubin, negative for nitrite and leukocyte Estrace, WBCs 0-2, RBCs 2-5, epithelial cells rare,  bacteria few;     Discussion/consultation: At this time, the patient presents with abdominal pain.  She is been having this pain for several weeks and the patient's hospitalization and treatment were reviewed.  In summary, the patient appeared to have post cholecystectomy discomfort and possible abscess formation in the gallbladder fossa.  The patient underwent a work-up including a HIDA scan which did not show a bile duct leak.  The patient was given IV antibiotics and did not receive interventional radiology treatment for potential abscess.  Patient has seen pain management.  I reviewed the patient's previous discharge summary and spoke with the daughter regarding hospice care.  There appears to be confusion between the patient and the daughter and the discussing physician.  Currently, the patient and her daughter want everything done and are not interested in putting the patient in hospice care at this time.  I also believe after discussion they do not want to initiate a DNR DNI status at this time.  Therefore, I spoke with the hospitalist on-call.  Patient will be admitted for IV antibiotics and consideration of drainage of the fluid collection by interventional radiology.    FINAL IMPRESSION  1. Generalized abdominal pain    2. Elevated liver function tests    3. Intractable abdominal pain           PLAN  1.  Patient will be admitted for further monitoring, treatment, and care.    Electronically signed by: Guy G Gansert, M.D., 11/24/2021 11:26 AM

## 2021-11-24 NOTE — ASSESSMENT & PLAN NOTE
A1c 7.2 10/14/21  Diabetic diet  Continue home long-acting insulin  Insulin sliding scale  Frequent Accu-Cheks

## 2021-11-24 NOTE — ASSESSMENT & PLAN NOTE
Noted again on 11/24/2021 abdominal/pelvic CT  Contributing to intractable pain  IR consulted by admitting physician, fluid collection smaller than before and not recommending procedure.

## 2021-11-24 NOTE — ED TRIAGE NOTES
"Pt ambulates to triage  Chief Complaint   Patient presents with   • Abdominal Pain   • Nausea   constant abd pain, started at 430, \"had a bad night last night\" sees pain mangment await liver specialist referral    Pt A & 0 x 4, speech clear, ambulates well    - COVID vaccine      Pt oriented to room, call light within reach, gurney in lowest position          "

## 2021-11-24 NOTE — ED NOTES
"Patient comes in for abdominal pain x3 months. States it worsened this morning and \"it feels like it's burning\"  "

## 2021-11-24 NOTE — H&P
Hospital Medicine History & Physical Note    Date of Service  11/24/2021    Primary Care Physician  Jarrell Yates M.D.    Consultants      Code Status  DNAR/DNI    Chief Complaint  Chief Complaint   Patient presents with   • Abdominal Pain   • Nausea       History of Presenting Illness  66 y.o. female with a past medical history of chronic liver disease (unclear etiology; cholestatic pattern), DM1 with neuropathy, CAD (s/p 2 stents), hypothyroidism, HTN and recent [facility] discharge on 11/11/2021 for abdominal pain secondary to bowel fossa collection presents emergency department on 11/24/2021 with intractable abdominal pain that around 4:30 AM  woke her up from sleep.  As per chart review, on patient's last admission she was discharged to hospice and is scheduled to follow-up with HERNANDO Traore for GI work-up.  Patient reporting that she does not want to be in hospice and she wants to seek treatment to get better.  On further discussion she states that her pain has been worsening will be meds do help.  She does report constipation states that her last bowel movement was around 2 days ago.  She follows with pain management in the outpatient setting.  No fever, chills, orthostatic changes or loss of consciousness.    At the emergency department vital signs with hypertension up to the 170s.  Patient saturating well on room air.  CBC with improving leukocytosis and anemia.  Chemistry with improving hyponatremia and hypochlorhydria, LFTs, alkaline phosphatase and total bili from that of 11/19/2021.  Abdominal/pelvic CT with continued fluid/air collection at gallbladder fossa which are suspicious of abscess.  Patient was admitted for intractable pain requiring IV pain management.    I had an extensive conversation with patient regarding CODE STATUS which patient states that she would like to remain as a DNR/DNI.  Regardless of CODE STATUS patient is want to seek full treatment and find out the etiology of her liver disease.    I  discussed the plan of care with patient.    Review of Systems  Review of Systems   Constitutional: Positive for malaise/fatigue.   HENT: Negative.    Eyes: Negative.    Respiratory: Negative.    Cardiovascular: Negative.    Gastrointestinal: Positive for abdominal pain, constipation, heartburn and nausea. Negative for blood in stool and melena.   Genitourinary: Negative.    Musculoskeletal: Negative.    Skin: Negative.    Neurological: Positive for seizures.   Endo/Heme/Allergies: Negative.    Psychiatric/Behavioral: Negative.        Past Medical History   has a past medical history of Adverse effect of anesthesia, Anesthesia, Arthritis, Cigarette smoker (quit 2013), Dental disorder, depression (8/30/2016), Diabetes mellitus type 1 (HCC) (1989), Encounter for long-term (current) use of insulin (HCC) (9/25/2013), Heart burn, High cholesterol, Hypertension, Hypothyroidism, postsurgical (1970), Indigestion, Infectious disease, Joint replacement, Liver failure (HCC), Pain, Polyneuropathy in diabetes(357.2) (6/10/2015), Status post appendectomy, and 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    Surgical History   has a past surgical history that includes hysterectomy, vaginal (2006); thyroid lobectomy (1973); lumpectomy (1976, 2005); cervical disk and fusion anterior (03/12/08); tonsillectomy (1963); cervical fusion posterior (1/16/2009); hardware removal neuro (1/16/2009); neck exploration (1/16/2009); abdominal hysterectomy total; lumpectomy; lumbar laminectomy diskectomy (Right, 5/10/2016); shoulder decompression arthroscopic (6/17/08); clavicle distal excision (6/17/08); shoulder arthroscopy w/ rotator cuff repair (10/9/08); pr njx aa&/strd tfrml epi lumbar/sacral 1 level (Right, 8/31/2016); spinal cord stimulator (N/A, 10/26/2018); thoracic laminectomy (N/A, 10/26/2018); appendectomy (2004); pr implant neurostim/ (N/A, 12/16/2019); irrigation  & debridement neuro (1/19/2020); cath placement (1/25/2020); pr ercp,diagnostic (N/A, 10/26/2021); pr upper gi endoscopy,biopsy (N/A, 10/26/2021); pr upper gi endoscopy,diagnosis (N/A, 10/26/2021); and peter by laparoscopy (N/A, 10/28/2021).     Family History  family history includes Cancer in her father; Hypertension in her mother.     Social History   reports that she has been smoking cigarettes. She has a 15.00 pack-year smoking history. She has never used smokeless tobacco. She reports previous alcohol use. She reports current drug use.    Allergies  Allergies   Allergen Reactions   • Ativan Unspecified     Patient reports that she had ativan removed in the past. Reports leg jumping.    • Tape Unspecified     Blisters, paper tape is ok       Medications  Prior to Admission Medications   Prescriptions Last Dose Informant Patient Reported? Taking?   Ascorbic Acid (VITAMIN C PO) 11/23/2021 at AM Patient Yes No   Sig: Take 1 Tablet by mouth every morning.   B Complex Vitamins (VITAMIN B COMPLEX PO) 11/23/2021 at AM Patient Yes No   Sig: Take 1 Tablet by mouth every morning.   Docusate Calcium (STOOL SOFTENER PO) 11/23/2021 at PM Patient Yes No   Sig: Take 1 Tablet by mouth every evening.   SYNTHROID 200 MCG Tab 11/23/2021 at AM Patient Yes No   Sig: Take 200 mcg by mouth every morning. 200mcg tablet + 25mcg tablet for a total dose of 225mcg   TRESIBA FLEXTOUCH 100 UNIT/ML Solution Pen-injector 11/23/2021 at PM Patient Yes No   Sig: Inject 23 Units under the skin every evening.   aspirin 81 MG EC tablet 11/23/2021 at AM Patient No No   Sig: Take 1 Tab by mouth every morning.   furosemide (LASIX) 40 MG Tab 11/23/2021 at AM Patient No No   Sig: Take 1 Tablet by mouth every day.   gabapentin (NEURONTIN) 300 MG Cap 11/23/2021 at PM Patient No No   Sig: Take 1 Capsule by mouth 3 times a day as needed (restless leg syncrome).   insulin aspart (NOVOLOG FLEXPEN) 100 UNIT/ML injection PEN 11/23/2021 at PM Patient Yes No    Sig: Inject 1-5 Units under the skin in the morning, at noon, and at bedtime. 1 units for every 5 g of carbs   AND  Sliding Scale   150 - 200 = 1 units  201 - 250 = 2 units  251 - 300 = 3 units  301 - 350 = 4 units  351 - 400 = 5 units   levothyroxine (SYNTHROID) 25 MCG Tab 11/23/2021 at AM Patient Yes No   Sig: Take 25 mcg by mouth every morning on an empty stomach. 25mcg tablet + 200mcg tablet for a total dose of 225mcg   metoprolol (TOPROL-XL) 200 MG XL tablet 11/23/2021 at AM Patient No No   Sig: Take 1 tablet by mouth every day.   ondansetron (ZOFRAN ODT) 4 MG TABLET DISPERSIBLE 11/23/2021 at PM Patient No No   Sig: Take 1 Tablet by mouth every 6 hours as needed for Nausea.   oxyCODONE-acetaminophen (PERCOCET-10)  MG Tab 11/23/2021 at PM Patient Yes No   Sig: Take 1 Tablet by mouth every 6 hours as needed. Indications: Pain   pantoprazole (PROTONIX) 40 MG Tablet Delayed Response 11/23/2021 at AM Patient Yes No   Sig: Take 40 mg by mouth every morning.   pyridoxine (VITAMIN B-6) 50 MG Tab 11/23/2021 at AM Patient Yes No   Sig: Take 50 mg by mouth every morning.   sertraline (ZOLOFT) 100 MG Tab 11/23/2021 at PM Patient Yes No   Sig: Take 100 mg by mouth every evening.   spironolactone (ALDACTONE) 25 MG Tab 11/23/2021 at AM Patient No No   Sig: Take 1 Tablet by mouth every day.   traZODone (DESYREL) 100 MG Tab 11/23/2021 at PM Patient Yes No   Sig: Take 100 mg by mouth at bedtime.   ursodiol (ACTIGALL) 300 MG Cap 11/23/2021 at AM Patient No No   Sig: Take 1 Capsule by mouth every day.   vitamin D3 (QC VITAMIN D3) 5000 Unit (125 mcg) Tab 11/23/2021 at PM Patient Yes No   Sig: Take 5,000 Units by mouth 2 times a day.      Facility-Administered Medications: None       Physical Exam  Temp:  [36.6 °C (97.8 °F)] 36.6 °C (97.8 °F)  Pulse:  [66-82] 78  Resp:  [12-20] 14  BP: (120-175)/(43-86) 150/86  SpO2:  [93 %-100 %] 98 %  Blood Pressure: 147/72   Temperature: 36.6 °C (97.8 °F)   Pulse: 71   Respiration: 14    Pulse Oximetry: 95 %       Physical Exam  Constitutional:       Appearance: Normal appearance.   HENT:      Head: Normocephalic and atraumatic.      Mouth/Throat:      Mouth: Mucous membranes are moist.   Eyes:      Extraocular Movements: Extraocular movements intact.      Pupils: Pupils are equal, round, and reactive to light.   Cardiovascular:      Rate and Rhythm: Normal rate and regular rhythm.      Pulses: Normal pulses.      Heart sounds: Normal heart sounds.   Pulmonary:      Effort: Pulmonary effort is normal.      Breath sounds: Normal breath sounds.   Abdominal:      General: Bowel sounds are normal.      Palpations: Abdomen is soft.   Musculoskeletal:         General: No swelling. Normal range of motion.      Cervical back: Normal range of motion and neck supple.   Skin:     General: Skin is warm.      Coloration: Skin is not jaundiced.   Neurological:      General: No focal deficit present.      Mental Status: She is alert and oriented to person, place, and time. Mental status is at baseline.      Cranial Nerves: No cranial nerve deficit.   Psychiatric:         Mood and Affect: Mood normal.         Behavior: Behavior normal.         Thought Content: Thought content normal.         Judgment: Judgment normal.         Laboratory:  Recent Labs     11/24/21  1150   WBC 10.9*   RBC 3.24*   HEMOGLOBIN 10.2*   HEMATOCRIT 32.1*   MCV 99.1*   MCH 31.5   MCHC 31.8*   RDW 53.2*   PLATELETCT 426   MPV 10.8     Recent Labs     11/24/21  1150   SODIUM 129*   POTASSIUM 4.0   CHLORIDE 92*   CO2 25   GLUCOSE 163*   BUN 23*   CREATININE 0.90   CALCIUM 8.7     Recent Labs     11/24/21  1150   ALTSGPT 88*   ASTSGOT 114*   ALKPHOSPHAT 1979*   TBILIRUBIN 4.0*   LIPASE 7   GLUCOSE 163*     Recent Labs     11/24/21  1150   APTT 26.4   INR 0.85*     No results for input(s): NTPROBNP in the last 72 hours.      No results for input(s): TROPONINT in the last 72 hours.    Imaging:  CT-ABDOMEN-PELVIS WITH   Final Result      1.   There is a continued mixed density heterogeneous fluid collection with air seen in the gallbladder fossa. Again this is suspicious for abscess.   2.  The peripherally enhancing fluid collection in the anterior abdominal wall midline just deep to the rectus muscle is minimally decreased in size since 11/7/2021 as is the small peripherally enhancing fluid collection adjacent to the lateral segment    left hepatic lobe. These could be intra-abdominal abscesses versus postoperative seromas.   3.  Common bile duct stent is unchanged without biliary dilatation. Minimal pneumobilia is again noted.   4.  Diffuse mesenteric and body wall edema is again noted.      IR-CONSULT AND TREAT    (Results Pending)     Assessment/Plan:  I anticipate this patient is appropriate for observation status at this time.    * Intractable pain- (present on admission)  Assessment & Plan  Patient presenting with intractable abdominal pain which persist after 2 IV medications provided at the emergency department  Secondary to chronic right upper quadrant postsurgical fluid collection, possible abscess  Admit to observation  As needed pain management  Consult IR for gallbladder fossa drain  Labs in a.m.    Elevated LFTs- (present on admission)  Assessment & Plan  Secondary to chronic liver failure of unknown etiology  Trend    Right upper quadrant pain- (present on admission)  Assessment & Plan  Chronic and secondary to gallbladder fossa collection  Consult IR for possible drainage  As needed pain management  Labs in a.m.    Fluid collection at surgical site- (present on admission)  Assessment & Plan  Noted again on 11/24/2021 abdominal/pelvic CT  Contributing to intractable pain  Patient consenting to IR drain and requesting full treatment   Consult IR, keep n.p.o. for now  Labs in a.m.    Hyponatremia- (present on admission)  Assessment & Plan  Chronic and secondary to chronic liver disease of unknown etiology  IV hydration  Labs in  a.m.    Dehydration- (present on admission)  Assessment & Plan  Hyponatremia and hypochlorhydria as well as dry mucous membranes  IV hydration  Labs in a.m.    GERD (gastroesophageal reflux disease)- (present on admission)  Assessment & Plan  Continue home PPI    Anxiety- (present on admission)  Assessment & Plan  Continue home SSRI    Hypertension- (present on admission)  Assessment & Plan  Continue home metoprolol and spironolactone  As needed antihypertensives    Uncontrolled type 1 diabetes mellitus (HCC)- (present on admission)  Assessment & Plan  Diabetic diet  Continue home long-acting insulin  Insulin sliding scale  Frequent Accu-Cheks    Hypothyroidism, postsurgical- (present on admission)  Assessment & Plan  Continue home levothyroxine      VTE prophylaxis: enoxaparin ppx

## 2021-11-24 NOTE — ASSESSMENT & PLAN NOTE
Patient presenting with intractable abdominal pain which persist after 2 IV medications provided at the emergency department  Secondary to chronic right upper quadrant postsurgical fluid collection, possible abscess  Pain regimen in place, required IV dilaudid

## 2021-11-25 LAB
ALBUMIN SERPL BCP-MCNC: 2.2 G/DL (ref 3.2–4.9)
ALBUMIN/GLOB SERPL: 0.6 G/DL
ALP SERPL-CCNC: 1690 U/L (ref 30–99)
ALT SERPL-CCNC: 76 U/L (ref 2–50)
ANION GAP SERPL CALC-SCNC: 12 MMOL/L (ref 7–16)
AST SERPL-CCNC: 97 U/L (ref 12–45)
BASOPHILS # BLD AUTO: 1.6 % (ref 0–1.8)
BASOPHILS # BLD: 0.17 K/UL (ref 0–0.12)
BILIRUB SERPL-MCNC: 3.6 MG/DL (ref 0.1–1.5)
BUN SERPL-MCNC: 20 MG/DL (ref 8–22)
CALCIUM SERPL-MCNC: 8.4 MG/DL (ref 8.4–10.2)
CHLORIDE SERPL-SCNC: 92 MMOL/L (ref 96–112)
CO2 SERPL-SCNC: 24 MMOL/L (ref 20–33)
CREAT SERPL-MCNC: 0.89 MG/DL (ref 0.5–1.4)
EOSINOPHIL # BLD AUTO: 0.67 K/UL (ref 0–0.51)
EOSINOPHIL NFR BLD: 6.2 % (ref 0–6.9)
ERYTHROCYTE [DISTWIDTH] IN BLOOD BY AUTOMATED COUNT: 53.7 FL (ref 35.9–50)
GLOBULIN SER CALC-MCNC: 3.6 G/DL (ref 1.9–3.5)
GLUCOSE BLD-MCNC: 121 MG/DL (ref 65–99)
GLUCOSE BLD-MCNC: 229 MG/DL (ref 65–99)
GLUCOSE BLD-MCNC: 281 MG/DL (ref 65–99)
GLUCOSE BLD-MCNC: 84 MG/DL (ref 65–99)
GLUCOSE SERPL-MCNC: 90 MG/DL (ref 65–99)
HCT VFR BLD AUTO: 29.4 % (ref 37–47)
HGB BLD-MCNC: 9.4 G/DL (ref 12–16)
IMM GRANULOCYTES # BLD AUTO: 0.05 K/UL (ref 0–0.11)
IMM GRANULOCYTES NFR BLD AUTO: 0.5 % (ref 0–0.9)
LYMPHOCYTES # BLD AUTO: 1 K/UL (ref 1–4.8)
LYMPHOCYTES NFR BLD: 9.3 % (ref 22–41)
MCH RBC QN AUTO: 31.6 PG (ref 27–33)
MCHC RBC AUTO-ENTMCNC: 32 G/DL (ref 33.6–35)
MCV RBC AUTO: 99 FL (ref 81.4–97.8)
MONOCYTES # BLD AUTO: 1.61 K/UL (ref 0–0.85)
MONOCYTES NFR BLD AUTO: 14.9 % (ref 0–13.4)
NEUTROPHILS # BLD AUTO: 7.29 K/UL (ref 2–7.15)
NEUTROPHILS NFR BLD: 67.5 % (ref 44–72)
NRBC # BLD AUTO: 0 K/UL
NRBC BLD-RTO: 0 /100 WBC
PLATELET # BLD AUTO: 417 K/UL (ref 164–446)
PMV BLD AUTO: 11.4 FL (ref 9–12.9)
POTASSIUM SERPL-SCNC: 3.6 MMOL/L (ref 3.6–5.5)
PROT SERPL-MCNC: 5.8 G/DL (ref 6–8.2)
RBC # BLD AUTO: 2.97 M/UL (ref 4.2–5.4)
SODIUM SERPL-SCNC: 128 MMOL/L (ref 135–145)
WBC # BLD AUTO: 10.8 K/UL (ref 4.8–10.8)

## 2021-11-25 PROCEDURE — 700105 HCHG RX REV CODE 258: Performed by: STUDENT IN AN ORGANIZED HEALTH CARE EDUCATION/TRAINING PROGRAM

## 2021-11-25 PROCEDURE — 85025 COMPLETE CBC W/AUTO DIFF WBC: CPT

## 2021-11-25 PROCEDURE — A9270 NON-COVERED ITEM OR SERVICE: HCPCS | Performed by: INTERNAL MEDICINE

## 2021-11-25 PROCEDURE — A9270 NON-COVERED ITEM OR SERVICE: HCPCS | Performed by: STUDENT IN AN ORGANIZED HEALTH CARE EDUCATION/TRAINING PROGRAM

## 2021-11-25 PROCEDURE — 36415 COLL VENOUS BLD VENIPUNCTURE: CPT

## 2021-11-25 PROCEDURE — 99226 PR SUBSEQUENT OBSERVATION CARE,LEVEL III: CPT | Performed by: INTERNAL MEDICINE

## 2021-11-25 PROCEDURE — 96376 TX/PRO/DX INJ SAME DRUG ADON: CPT

## 2021-11-25 PROCEDURE — 700102 HCHG RX REV CODE 250 W/ 637 OVERRIDE(OP): Performed by: INTERNAL MEDICINE

## 2021-11-25 PROCEDURE — 80053 COMPREHEN METABOLIC PANEL: CPT

## 2021-11-25 PROCEDURE — 82962 GLUCOSE BLOOD TEST: CPT | Mod: 91

## 2021-11-25 PROCEDURE — 700111 HCHG RX REV CODE 636 W/ 250 OVERRIDE (IP): Performed by: INTERNAL MEDICINE

## 2021-11-25 PROCEDURE — 700111 HCHG RX REV CODE 636 W/ 250 OVERRIDE (IP): Performed by: STUDENT IN AN ORGANIZED HEALTH CARE EDUCATION/TRAINING PROGRAM

## 2021-11-25 PROCEDURE — 96372 THER/PROPH/DIAG INJ SC/IM: CPT

## 2021-11-25 PROCEDURE — 700102 HCHG RX REV CODE 250 W/ 637 OVERRIDE(OP): Performed by: STUDENT IN AN ORGANIZED HEALTH CARE EDUCATION/TRAINING PROGRAM

## 2021-11-25 PROCEDURE — G0378 HOSPITAL OBSERVATION PER HR: HCPCS

## 2021-11-25 RX ORDER — HYDROMORPHONE HYDROCHLORIDE 1 MG/ML
0.5 INJECTION, SOLUTION INTRAMUSCULAR; INTRAVENOUS; SUBCUTANEOUS
Status: DISCONTINUED | OUTPATIENT
Start: 2021-11-25 | End: 2021-11-27 | Stop reason: HOSPADM

## 2021-11-25 RX ORDER — OXYCODONE HYDROCHLORIDE 10 MG/1
10 TABLET ORAL
Status: DISCONTINUED | OUTPATIENT
Start: 2021-11-25 | End: 2021-11-27 | Stop reason: HOSPADM

## 2021-11-25 RX ORDER — OXYCODONE HYDROCHLORIDE 5 MG/1
5 TABLET ORAL
Status: DISCONTINUED | OUTPATIENT
Start: 2021-11-25 | End: 2021-11-27 | Stop reason: HOSPADM

## 2021-11-25 RX ADMIN — FUROSEMIDE 40 MG: 40 TABLET ORAL at 05:51

## 2021-11-25 RX ADMIN — SERTRALINE HYDROCHLORIDE 100 MG: 50 TABLET ORAL at 17:04

## 2021-11-25 RX ADMIN — OXYCODONE HYDROCHLORIDE 10 MG: 10 TABLET ORAL at 19:45

## 2021-11-25 RX ADMIN — HYDROMORPHONE HYDROCHLORIDE 0.5 MG: 1 INJECTION, SOLUTION INTRAMUSCULAR; INTRAVENOUS; SUBCUTANEOUS at 15:21

## 2021-11-25 RX ADMIN — Medication 5000 UNITS: at 17:04

## 2021-11-25 RX ADMIN — OXYCODONE HYDROCHLORIDE 10 MG: 10 TABLET ORAL at 10:04

## 2021-11-25 RX ADMIN — Medication 10 MG: at 21:47

## 2021-11-25 RX ADMIN — OMEPRAZOLE 20 MG: 20 CAPSULE, DELAYED RELEASE ORAL at 17:04

## 2021-11-25 RX ADMIN — TRAZODONE HYDROCHLORIDE 100 MG: 50 TABLET ORAL at 21:47

## 2021-11-25 RX ADMIN — SPIRONOLACTONE 25 MG: 25 TABLET ORAL at 05:51

## 2021-11-25 RX ADMIN — HYDROMORPHONE HYDROCHLORIDE 0.25 MG: 1 INJECTION, SOLUTION INTRAMUSCULAR; INTRAVENOUS; SUBCUTANEOUS at 03:10

## 2021-11-25 RX ADMIN — SODIUM CHLORIDE, POTASSIUM CHLORIDE, SODIUM LACTATE AND CALCIUM CHLORIDE: 600; 310; 30; 20 INJECTION, SOLUTION INTRAVENOUS at 20:52

## 2021-11-25 RX ADMIN — METOPROLOL SUCCINATE 200 MG: 100 TABLET, EXTENDED RELEASE ORAL at 05:50

## 2021-11-25 RX ADMIN — SODIUM CHLORIDE, POTASSIUM CHLORIDE, SODIUM LACTATE AND CALCIUM CHLORIDE: 600; 310; 30; 20 INJECTION, SOLUTION INTRAVENOUS at 09:04

## 2021-11-25 RX ADMIN — LEVOTHYROXINE SODIUM 200 MCG: 0.1 TABLET ORAL at 05:50

## 2021-11-25 RX ADMIN — SENNOSIDES AND DOCUSATE SODIUM 2 TABLET: 50; 8.6 TABLET ORAL at 17:05

## 2021-11-25 RX ADMIN — INSULIN LISPRO 2 UNITS: 100 INJECTION, SOLUTION INTRAVENOUS; SUBCUTANEOUS at 17:09

## 2021-11-25 RX ADMIN — OXYCODONE HYDROCHLORIDE 10 MG: 10 TABLET ORAL at 05:59

## 2021-11-25 RX ADMIN — HYDROMORPHONE HYDROCHLORIDE 0.25 MG: 1 INJECTION, SOLUTION INTRAMUSCULAR; INTRAVENOUS; SUBCUTANEOUS at 11:11

## 2021-11-25 RX ADMIN — PYRIDOXINE HCL TAB 50 MG 50 MG: 50 TAB at 05:51

## 2021-11-25 RX ADMIN — Medication 5000 UNITS: at 05:51

## 2021-11-25 RX ADMIN — OXYCODONE HYDROCHLORIDE 10 MG: 10 TABLET ORAL at 01:24

## 2021-11-25 RX ADMIN — INSULIN GLARGINE 23 UNITS: 100 INJECTION, SOLUTION SUBCUTANEOUS at 17:08

## 2021-11-25 RX ADMIN — LEVOTHYROXINE SODIUM 25 MCG: 0.03 TABLET ORAL at 05:51

## 2021-11-25 RX ADMIN — OMEPRAZOLE 20 MG: 20 CAPSULE, DELAYED RELEASE ORAL at 05:51

## 2021-11-25 RX ADMIN — INSULIN LISPRO 4 UNITS: 100 INJECTION, SOLUTION INTRAVENOUS; SUBCUTANEOUS at 23:48

## 2021-11-25 RX ADMIN — OXYCODONE HYDROCHLORIDE 10 MG: 10 TABLET ORAL at 14:12

## 2021-11-25 RX ADMIN — HYDROMORPHONE HYDROCHLORIDE 0.5 MG: 1 INJECTION, SOLUTION INTRAMUSCULAR; INTRAVENOUS; SUBCUTANEOUS at 20:51

## 2021-11-25 ASSESSMENT — PAIN DESCRIPTION - PAIN TYPE
TYPE: CHRONIC PAIN
TYPE: CHRONIC PAIN;ACUTE PAIN
TYPE: ACUTE PAIN
TYPE: CHRONIC PAIN;ACUTE PAIN
TYPE: ACUTE PAIN
TYPE: CHRONIC PAIN;ACUTE PAIN
TYPE: CHRONIC PAIN
TYPE: ACUTE PAIN
TYPE: ACUTE PAIN
TYPE: CHRONIC PAIN
TYPE: CHRONIC PAIN;ACUTE PAIN
TYPE: ACUTE PAIN
TYPE: CHRONIC PAIN

## 2021-11-25 ASSESSMENT — ENCOUNTER SYMPTOMS
DIARRHEA: 0
CHILLS: 0
HEADACHES: 0
SHORTNESS OF BREATH: 0
NAUSEA: 1
VOMITING: 0
COUGH: 0
FEVER: 0
PALPITATIONS: 0
CONSTIPATION: 0
DIZZINESS: 0
ABDOMINAL PAIN: 1
BACK PAIN: 0

## 2021-11-25 NOTE — PROGRESS NOTES
Received report from night shift RN, assumed care of pt. AAOx4. VSS. Pt states pain 3/10, denies need for intervention at this time. Denies nausea or numbness/tingling at this time. Updated on plan of care for the day. Safety precautions in place, pt educated to call for assistance.

## 2021-11-25 NOTE — CARE PLAN
The patient is Watcher - Medium risk of patient condition declining or worsening    Shift Goals  Clinical Goals: Pain control   Patient Goals: Discharge home     Progress made toward(s) clinical / shift goals:  Pt educated on POC and has remained free from falls     Patient is not progressing towards the following goals: Pt is unable to find adequate pain control and is restless through the night       Problem: Pain - Standard  Goal: Alleviation of pain or a reduction in pain to the patient’s comfort goal  Outcome: Not Progressing     Problem: Knowledge Deficit - Standard  Goal: Patient and family/care givers will demonstrate understanding of plan of care, disease process/condition, diagnostic tests and medications  Outcome: Not Progressing

## 2021-11-25 NOTE — PROGRESS NOTES
IR Nursing Note:  Order received for possible drain placement.  Dr. Rico reviewed the imaging.  Per Dr. Rico the fluid collection is decreasing in size and does not recommended intervention/drain at this time.  Dr. Blunt updated along with ER RN.

## 2021-11-25 NOTE — PROGRESS NOTES
1905: Received report from Day shift RN. Pt is laying in bed, A+OX4 , Pt c/o constant pain and denies relief after pain medication. Pt is tearful. POC discussed. Fall precautions in place.  Call light and belongings at bedside and within reach. All questions answered at this time. Rounding in place

## 2021-11-25 NOTE — PROGRESS NOTES
4 Eyes Skin Assessment Completed by MINI Aponte and MINI Glass.    Head WDL  Ears WDL  Nose WDL  Mouth WDL  Neck Jaundice  Breast/Chest Jaundice  Shoulder Blades WDL  Spine WDL  (R) Arm/Elbow/Hand Jaundice, Scab, abrasion  (L) Arm/Elbow/Hand Jaundice, Scab  Abdomen Jaundice, Scab, Abrasion  Groin WDL  Scrotum/Coccyx/Buttocks WDL  (R) Leg Scab, Jaundice  (L) Leg Scab, Jaundice  (R) Heel/Foot/Toe Jaundice  (L) Heel/Foot/Toe Jaundice          Devices In Places Blood Pressure Cuff and Pulse Ox      Interventions In Place Pressure Redistribution Mattress    Possible Skin Injury No    Pictures Uploaded Into Epic N/A  Wound Consult Placed N/A  RN Wound Prevention Protocol Ordered No

## 2021-11-25 NOTE — HOSPITAL COURSE
66-year-old female with past medical history of chronic liver disease unclear etiology, T1DM on home insulin, CAD with 2 prior stents, hypothyroidism, hypertension, recent discharge from hospital on 11/11/2021 for similar complaints of significant abdominal pain.  Admitted on 11/24/2024 recurrent intractable abdominal pain, primarily in the right upper quadrant.    Prior labs noted to have positive SCARLETT, speckled pattern, but negative rheumatologic antibody serologies including anti-mitochondrial-Ab, Anti-DNADs-Ab.

## 2021-11-25 NOTE — CARE PLAN
The patient is Stable - Low risk of patient condition declining or worsening    Shift Goals  Clinical Goals: pain management  Patient Goals: Pt will have adequate pain control    Progress made toward(s) clinical / shift goals:  Given pharmacological and non pharmacological pain intervention.    Patient is not progressing towards the following goals:      Problem: Fall Risk  Goal: Patient will remain free from falls  Outcome: Progressing     Problem: Pain - Standard  Goal: Alleviation of pain or a reduction in pain to the patient’s comfort goal  Outcome: Progressing

## 2021-11-25 NOTE — PROGRESS NOTES
Hospital Medicine Daily Progress Note    Date of Service  11/25/2021    Chief Complaint  Anu Manuel is a 66 y.o. female admitted 11/24/2021 with right abdominal pain    Hospital Course  66-year-old female with past medical history of chronic liver disease unclear etiology, T1DM on home insulin, CAD with 2 prior stents, hypothyroidism, hypertension, recent discharge from hospital on 11/11/2021 for similar complaints of significant abdominal pain.  Admitted on 11/24/2024 recurrent intractable abdominal pain, primarily in the right upper quadrant.    Prior labs noted to have positive SCARLETT, speckled pattern, but negative rheumatologic antibody serologies including anti-mitochondrial-Ab, Anti-DNADs-Ab.    Interval Problem Update  Patient complaining of severe abdominal pain primarily in the right upper quadrant, moves to the left side.  10 out of 10 in nature. Describes sharp pain to even touching ribs.  Minimally helped with opioid medications.  Patient stated she has tried multiple and currently does see pain management outpatient with Dr. Engel.  Patient stated she has an appointment with  [facility] on 12/13/2021 for initial appointment as patient's pain has been persistent and unable to find source.  Patient did not want to be on hospice as prior discharge note had suggested.  CT abdomen pelvis reviewed, patient does show significant stool burden in her colon, patient denied constipation.  Patient does have a CBD stent in place.  Patient requiring IV Dilaudid    I have personally seen and examined the patient at bedside. I discussed the plan of care with patient, bedside RN, charge RN,  and pharmacy.    Consultants/Specialty  none    Code Status  DNAR/DNI    Disposition  Patient is not medically cleared.   Anticipate discharge to to home with close outpatient follow-up.  I have placed the appropriate orders for post-discharge needs.    Review of Systems  Review of Systems   Constitutional:  Negative for chills, fever and malaise/fatigue.   Respiratory: Negative for cough and shortness of breath.    Cardiovascular: Negative for chest pain and palpitations.   Gastrointestinal: Positive for abdominal pain and nausea. Negative for constipation, diarrhea and vomiting.   Musculoskeletal: Negative for back pain and joint pain.   Neurological: Negative for dizziness and headaches.   All other systems reviewed and are negative.       Physical Exam  Temp:  [36.6 °C (97.9 °F)-36.7 °C (98.1 °F)] 36.7 °C (98.1 °F)  Pulse:  [57-82] 57  Resp:  [12-20] 18  BP: (137-166)/() 159/68  SpO2:  [93 %-100 %] 96 %    Physical Exam  Vitals and nursing note reviewed.   Constitutional:       General: She is in acute distress (due to abdominal pain).      Appearance: Normal appearance. She is not ill-appearing.   HENT:      Head: Normocephalic and atraumatic.   Eyes:      General: No scleral icterus.     Extraocular Movements: Extraocular movements intact.   Cardiovascular:      Rate and Rhythm: Normal rate.      Pulses: Normal pulses.      Heart sounds: Normal heart sounds. No murmur heard.      Pulmonary:      Effort: Pulmonary effort is normal. No respiratory distress.      Breath sounds: Normal breath sounds.   Abdominal:      General: Abdomen is flat. Bowel sounds are normal. There is no distension.      Palpations: Abdomen is soft.      Tenderness: There is abdominal tenderness (RUQ).   Musculoskeletal:         General: No swelling or tenderness. Normal range of motion.      Cervical back: Normal range of motion and neck supple.   Skin:     General: Skin is warm.      Capillary Refill: Capillary refill takes less than 2 seconds.      Coloration: Skin is not jaundiced.      Findings: No erythema.   Neurological:      General: No focal deficit present.      Mental Status: She is alert and oriented to person, place, and time. Mental status is at baseline.      Motor: No weakness.   Psychiatric:         Mood and Affect:  Mood normal.         Behavior: Behavior normal.         Thought Content: Thought content normal.         Judgment: Judgment normal.         Fluids    Intake/Output Summary (Last 24 hours) at 11/25/2021 1318  Last data filed at 11/25/2021 0800  Gross per 24 hour   Intake 820 ml   Output --   Net 820 ml       Laboratory  Recent Labs     11/24/21  1150 11/25/21  0322   WBC 10.9* 10.8   RBC 3.24* 2.97*   HEMOGLOBIN 10.2* 9.4*   HEMATOCRIT 32.1* 29.4*   MCV 99.1* 99.0*   MCH 31.5 31.6   MCHC 31.8* 32.0*   RDW 53.2* 53.7*   PLATELETCT 426 417   MPV 10.8 11.4     Recent Labs     11/24/21  1150 11/25/21  0322   SODIUM 129* 128*   POTASSIUM 4.0 3.6   CHLORIDE 92* 92*   CO2 25 24   GLUCOSE 163* 90   BUN 23* 20   CREATININE 0.90 0.89   CALCIUM 8.7 8.4     Recent Labs     11/24/21  1150   APTT 26.4   INR 0.85*               Imaging  CT-ABDOMEN-PELVIS WITH   Final Result      1.  There is a continued mixed density heterogeneous fluid collection with air seen in the gallbladder fossa. Again this is suspicious for abscess.   2.  The peripherally enhancing fluid collection in the anterior abdominal wall midline just deep to the rectus muscle is minimally decreased in size since 11/7/2021 as is the small peripherally enhancing fluid collection adjacent to the lateral segment    left hepatic lobe. These could be intra-abdominal abscesses versus postoperative seromas.   3.  Common bile duct stent is unchanged without biliary dilatation. Minimal pneumobilia is again noted.   4.  Diffuse mesenteric and body wall edema is again noted.      IR-CONSULT AND TREAT    (Results Pending)        Assessment/Plan  * Intractable pain- (present on admission)  Assessment & Plan  Patient presenting with intractable abdominal pain which persist after 2 IV medications provided at the emergency department  Secondary to chronic right upper quadrant postsurgical fluid collection, possible abscess  Pain regimen in place, required IV dilaudid    Type 1  diabetes mellitus on insulin therapy (HCC)- (present on admission)  Assessment & Plan  A1c 7.2 10/14/21  Diabetic diet  Continue home long-acting insulin  Insulin sliding scale  Frequent Accu-Cheks    Elevated LFTs- (present on admission)  Assessment & Plan  Secondary to chronic liver failure of unknown etiology  Trend    Right upper quadrant pain- (present on admission)  Assessment & Plan  Chronic and secondary to gallbladder fossa collection  Plan As above for intractable pain    Fluid collection at surgical site- (present on admission)  Assessment & Plan  Noted again on 11/24/2021 abdominal/pelvic CT  Contributing to intractable pain  IR consulted by admitting physician, fluid collection smaller than before and not recommending procedure.    Dehydration- (present on admission)  Assessment & Plan  Hyponatremia and hypochlorhydria as well as dry mucous membranes  IV hydration    Hyponatremia- (present on admission)  Assessment & Plan  Chronic and secondary to chronic liver disease of unknown etiology  IV hydration  Labs in a.m.    GERD (gastroesophageal reflux disease)- (present on admission)  Assessment & Plan  Continue home PPI    Anxiety- (present on admission)  Assessment & Plan  Continue home SSRI    Hypertension- (present on admission)  Assessment & Plan  Continue home metoprolol and spironolactone  As needed antihypertensives    Hypothyroidism, postsurgical- (present on admission)  Assessment & Plan  Continue home levothyroxine     VTE prophylaxis: SCDs/TEDs and enoxaparin ppx    I have performed a physical exam and reviewed and updated ROS and Plan today (11/25/2021). In review of yesterday's note (11/24/2021), there are no changes except as documented above.

## 2021-11-25 NOTE — PROGRESS NOTES
Received report from ER RN. Assumed pt care. Assessment and chart check complete. Pt is AA0X4, no signs of distress, denies nausea/vomiting. Reports pain medicated per MAR. Blood sugar taken, insulin given. IVF infusing. Fall precautions in place, treaded socks on pt, bed in low position. Call light within reach. Educated pt to call if needing anything.

## 2021-11-25 NOTE — CARE PLAN
The patient is Stable - Low risk of patient condition declining or worsening    Shift Goals  Clinical Goals: pain control, rest  Patient Goals: pain control, comfort     Progress made toward(s) clinical / shift goals:  Pt has been medicated per MAR this shift for pain control. Denies further needs this shift.     Patient is not progressing towards the following goals:

## 2021-11-26 PROBLEM — K59.00 CONSTIPATION: Status: ACTIVE | Noted: 2021-11-26

## 2021-11-26 LAB
GLUCOSE BLD-MCNC: 214 MG/DL (ref 65–99)
GLUCOSE BLD-MCNC: 225 MG/DL (ref 65–99)
GLUCOSE BLD-MCNC: 227 MG/DL (ref 65–99)
GLUCOSE BLD-MCNC: 327 MG/DL (ref 65–99)

## 2021-11-26 PROCEDURE — 700111 HCHG RX REV CODE 636 W/ 250 OVERRIDE (IP): Performed by: INTERNAL MEDICINE

## 2021-11-26 PROCEDURE — 700105 HCHG RX REV CODE 258: Performed by: STUDENT IN AN ORGANIZED HEALTH CARE EDUCATION/TRAINING PROGRAM

## 2021-11-26 PROCEDURE — A9270 NON-COVERED ITEM OR SERVICE: HCPCS | Performed by: STUDENT IN AN ORGANIZED HEALTH CARE EDUCATION/TRAINING PROGRAM

## 2021-11-26 PROCEDURE — 700111 HCHG RX REV CODE 636 W/ 250 OVERRIDE (IP): Performed by: STUDENT IN AN ORGANIZED HEALTH CARE EDUCATION/TRAINING PROGRAM

## 2021-11-26 PROCEDURE — 96376 TX/PRO/DX INJ SAME DRUG ADON: CPT

## 2021-11-26 PROCEDURE — 700102 HCHG RX REV CODE 250 W/ 637 OVERRIDE(OP): Performed by: INTERNAL MEDICINE

## 2021-11-26 PROCEDURE — G0378 HOSPITAL OBSERVATION PER HR: HCPCS

## 2021-11-26 PROCEDURE — 99225 PR SUBSEQUENT OBSERVATION CARE,LEVEL II: CPT | Performed by: INTERNAL MEDICINE

## 2021-11-26 PROCEDURE — 82962 GLUCOSE BLOOD TEST: CPT

## 2021-11-26 PROCEDURE — A9270 NON-COVERED ITEM OR SERVICE: HCPCS | Performed by: INTERNAL MEDICINE

## 2021-11-26 PROCEDURE — 700102 HCHG RX REV CODE 250 W/ 637 OVERRIDE(OP): Performed by: STUDENT IN AN ORGANIZED HEALTH CARE EDUCATION/TRAINING PROGRAM

## 2021-11-26 PROCEDURE — 96372 THER/PROPH/DIAG INJ SC/IM: CPT

## 2021-11-26 RX ORDER — LACTULOSE 20 G/30ML
15 SOLUTION ORAL 2 TIMES DAILY
Status: DISCONTINUED | OUTPATIENT
Start: 2021-11-26 | End: 2021-11-27 | Stop reason: HOSPADM

## 2021-11-26 RX ADMIN — HYDROMORPHONE HYDROCHLORIDE 0.5 MG: 1 INJECTION, SOLUTION INTRAMUSCULAR; INTRAVENOUS; SUBCUTANEOUS at 03:54

## 2021-11-26 RX ADMIN — INSULIN GLARGINE 23 UNITS: 100 INJECTION, SOLUTION SUBCUTANEOUS at 17:26

## 2021-11-26 RX ADMIN — URSODIOL 300 MG: 300 CAPSULE ORAL at 05:18

## 2021-11-26 RX ADMIN — SERTRALINE HYDROCHLORIDE 100 MG: 50 TABLET ORAL at 17:22

## 2021-11-26 RX ADMIN — HYDROMORPHONE HYDROCHLORIDE 0.5 MG: 1 INJECTION, SOLUTION INTRAMUSCULAR; INTRAVENOUS; SUBCUTANEOUS at 11:46

## 2021-11-26 RX ADMIN — HYDROMORPHONE HYDROCHLORIDE 0.5 MG: 1 INJECTION, SOLUTION INTRAMUSCULAR; INTRAVENOUS; SUBCUTANEOUS at 07:49

## 2021-11-26 RX ADMIN — Medication 10 MG: at 20:00

## 2021-11-26 RX ADMIN — FUROSEMIDE 40 MG: 40 TABLET ORAL at 05:19

## 2021-11-26 RX ADMIN — SODIUM CHLORIDE, POTASSIUM CHLORIDE, SODIUM LACTATE AND CALCIUM CHLORIDE: 600; 310; 30; 20 INJECTION, SOLUTION INTRAVENOUS at 10:29

## 2021-11-26 RX ADMIN — LEVOTHYROXINE SODIUM 200 MCG: 0.1 TABLET ORAL at 05:19

## 2021-11-26 RX ADMIN — Medication 5000 UNITS: at 17:22

## 2021-11-26 RX ADMIN — OMEPRAZOLE 20 MG: 20 CAPSULE, DELAYED RELEASE ORAL at 17:23

## 2021-11-26 RX ADMIN — TRAZODONE HYDROCHLORIDE 100 MG: 50 TABLET ORAL at 20:00

## 2021-11-26 RX ADMIN — INSULIN LISPRO 2 UNITS: 100 INJECTION, SOLUTION INTRAVENOUS; SUBCUTANEOUS at 11:54

## 2021-11-26 RX ADMIN — ENOXAPARIN SODIUM 40 MG: 40 INJECTION SUBCUTANEOUS at 05:18

## 2021-11-26 RX ADMIN — OMEPRAZOLE 20 MG: 20 CAPSULE, DELAYED RELEASE ORAL at 05:19

## 2021-11-26 RX ADMIN — OXYCODONE HYDROCHLORIDE 10 MG: 10 TABLET ORAL at 13:52

## 2021-11-26 RX ADMIN — SENNOSIDES AND DOCUSATE SODIUM 2 TABLET: 50; 8.6 TABLET ORAL at 05:18

## 2021-11-26 RX ADMIN — INSULIN LISPRO 2 UNITS: 100 INJECTION, SOLUTION INTRAVENOUS; SUBCUTANEOUS at 17:25

## 2021-11-26 RX ADMIN — INSULIN LISPRO 2 UNITS: 100 INJECTION, SOLUTION INTRAVENOUS; SUBCUTANEOUS at 05:27

## 2021-11-26 RX ADMIN — PYRIDOXINE HCL TAB 50 MG 50 MG: 50 TAB at 05:19

## 2021-11-26 RX ADMIN — SPIRONOLACTONE 25 MG: 25 TABLET ORAL at 05:19

## 2021-11-26 RX ADMIN — OXYCODONE HYDROCHLORIDE 10 MG: 10 TABLET ORAL at 17:23

## 2021-11-26 RX ADMIN — HYDROMORPHONE HYDROCHLORIDE 0.5 MG: 1 INJECTION, SOLUTION INTRAMUSCULAR; INTRAVENOUS; SUBCUTANEOUS at 20:00

## 2021-11-26 RX ADMIN — Medication 5000 UNITS: at 05:19

## 2021-11-26 RX ADMIN — OXYCODONE HYDROCHLORIDE 10 MG: 10 TABLET ORAL at 21:04

## 2021-11-26 RX ADMIN — LACTULOSE 15 ML: 20 SOLUTION ORAL at 10:26

## 2021-11-26 RX ADMIN — OXYCODONE HYDROCHLORIDE 10 MG: 10 TABLET ORAL at 06:47

## 2021-11-26 RX ADMIN — LEVOTHYROXINE SODIUM 25 MCG: 0.03 TABLET ORAL at 05:19

## 2021-11-26 RX ADMIN — OXYCODONE HYDROCHLORIDE 10 MG: 10 TABLET ORAL at 10:26

## 2021-11-26 RX ADMIN — METOPROLOL SUCCINATE 200 MG: 100 TABLET, EXTENDED RELEASE ORAL at 05:18

## 2021-11-26 RX ADMIN — HYDROMORPHONE HYDROCHLORIDE 0.5 MG: 1 INJECTION, SOLUTION INTRAMUSCULAR; INTRAVENOUS; SUBCUTANEOUS at 15:47

## 2021-11-26 RX ADMIN — SENNOSIDES AND DOCUSATE SODIUM 2 TABLET: 50; 8.6 TABLET ORAL at 17:22

## 2021-11-26 RX ADMIN — OXYCODONE HYDROCHLORIDE 10 MG: 10 TABLET ORAL at 02:40

## 2021-11-26 ASSESSMENT — PAIN DESCRIPTION - PAIN TYPE
TYPE: ACUTE PAIN
TYPE: ACUTE PAIN
TYPE: CHRONIC PAIN
TYPE: ACUTE PAIN
TYPE: ACUTE PAIN
TYPE: CHRONIC PAIN
TYPE: CHRONIC PAIN
TYPE: ACUTE PAIN

## 2021-11-26 ASSESSMENT — ENCOUNTER SYMPTOMS
VOMITING: 0
BACK PAIN: 0
CONSTIPATION: 0
PALPITATIONS: 0
FEVER: 0
CHILLS: 0
SHORTNESS OF BREATH: 0
NAUSEA: 1
DIZZINESS: 0
HEADACHES: 0
DIARRHEA: 0
COUGH: 0
ABDOMINAL PAIN: 1

## 2021-11-26 NOTE — PROGRESS NOTES
Assumed care of pt. A&O x4, pt reports pain to abdomen and back. Medicated per MAR. Pt denies nausea at the moment. No signs of distress. Pt comfortable in bed eating dinner with family at bedside. Call light and belongings in reach.

## 2021-11-26 NOTE — PROGRESS NOTES
Received report from nightshift RN and assumed care of patient at 0700. Patient reports 8/10 pain in back, medicated per MAR. Call light in reach. Bed in lowest locked position. Hourly rounding to continue.

## 2021-11-26 NOTE — CARE PLAN
Problem: Pain - Standard  Goal: Alleviation of pain or a reduction in pain to the patient’s comfort goal  Outcome: Progressing     Problem: Fall Risk  Goal: Patient will remain free from falls  Outcome: Progressing   The patient is Stable - Low risk of patient condition declining or worsening    Shift Goals  Clinical Goals: pain management  Patient Goals: pain control, comfort     Progress made toward(s) clinical / shift goals:  Pt reports controlled pain throughout shift after medicating per MAR. Pt able to rest. Pt remains free from falls.     Patient is not progressing towards the following goals:

## 2021-11-26 NOTE — ASSESSMENT & PLAN NOTE
Patient stated she is constipated. CT A/P shows increased colonic stool burden. Patient on pain medications.  - continue BM regimen  - trial lactulose BID

## 2021-11-26 NOTE — DISCHARGE PLANNING
ANTICIPATED DISCHARGE DISPOSITION: Home    ACTION: Chart reviewed. Patient remains hyponatremic and continues to require IV pain medications. 6-clicks score 24.     BARRIERS TO DISCHARGE: Pain management, medical clearance.    PLAN: Continue to monitor for additional discharge planning needs.

## 2021-11-26 NOTE — PROGRESS NOTES
[facility] Medicine Daily Progress Note    Date of Service  11/26/2021    Chief Complaint  Anu Manuel is a 66 y.o. female admitted 11/24/2021 with right abdominal pain    Hospital Course  66-year-old female with past medical history of chronic liver disease unclear etiology, T1DM on home insulin, CAD with 2 prior stents, hypothyroidism, hypertension, recent discharge from hospital on 11/11/2021 for similar complaints of significant abdominal pain.  Admitted on 11/24/2024 recurrent intractable abdominal pain, primarily in the right upper quadrant.    Prior labs noted to have positive SCARLETT, speckled pattern, but negative rheumatologic antibody serologies including anti-mitochondrial-Ab, Anti-DNADs-Ab.    Interval Problem Update  Patient complaining of severe abdominal pain continued. Crying in bed from thought of pain coming back. Stated IV dilaudid did help. Spoke to her about constipation, which patient stated started happening. CT A/P does show increased colonic stool burden.  She agreed to trial lactulose.      Discussed with patient about CBD stent, unclear if this is associated to her abdominal pain, concern about potentially removing is biliary obstruction, which would require drain placement and potentially more pain.  Ms. Manuel agreed to wait and see if constipation is her cause of pain before seeking help with [facility].    I have personally seen and examined the patient at bedside. I discussed the plan of care with patient, bedside RN, charge RN,  and pharmacy.    Consultants/Specialty  none    Code Status  DNAR/DNI    Disposition  Patient is not medically cleared.   Anticipate discharge to to home with close outpatient follow-up.  I have placed the appropriate orders for post-discharge needs.    Review of Systems  Review of Systems   Constitutional: Negative for chills, fever and malaise/fatigue.   Respiratory: Negative for cough and shortness of breath.    Cardiovascular: Negative  for chest pain and palpitations.   Gastrointestinal: Positive for abdominal pain and nausea. Negative for constipation, diarrhea and vomiting.   Musculoskeletal: Negative for back pain and joint pain.   Neurological: Negative for dizziness and headaches.   All other systems reviewed and are negative.       Physical Exam  Temp:  [36.7 °C (98.1 °F)-36.9 °C (98.4 °F)] 36.8 °C (98.2 °F)  Pulse:  [56-59] 56  Resp:  [15-18] 16  BP: (130-174)/(57-71) 130/57  SpO2:  [94 %-98 %] 98 %    Physical Exam  Vitals and nursing note reviewed.   Constitutional:       General: She is in acute distress (due to abdominal pain).      Appearance: Normal appearance. She is not ill-appearing.   HENT:      Head: Normocephalic and atraumatic.   Eyes:      General: No scleral icterus.     Extraocular Movements: Extraocular movements intact.   Cardiovascular:      Rate and Rhythm: Normal rate.      Pulses: Normal pulses.      Heart sounds: Normal heart sounds. No murmur heard.      Pulmonary:      Effort: Pulmonary effort is normal. No respiratory distress.      Breath sounds: Normal breath sounds.   Abdominal:      General: Abdomen is flat. Bowel sounds are normal. There is no distension.      Palpations: Abdomen is soft.      Tenderness: There is abdominal tenderness (RUQ).   Musculoskeletal:         General: No swelling or tenderness. Normal range of motion.      Cervical back: Normal range of motion and neck supple.   Skin:     General: Skin is warm.      Capillary Refill: Capillary refill takes less than 2 seconds.      Coloration: Skin is not jaundiced.      Findings: No erythema.   Neurological:      General: No focal deficit present.      Mental Status: She is alert and oriented to person, place, and time. Mental status is at baseline.      Motor: No weakness.   Psychiatric:         Mood and Affect: Mood normal.         Behavior: Behavior normal.         Thought Content: Thought content normal.         Judgment: Judgment normal.          Fluids    Intake/Output Summary (Last 24 hours) at 11/26/2021 0947  Last data filed at 11/26/2021 0600  Gross per 24 hour   Intake 1393 ml   Output --   Net 1393 ml       Laboratory  Recent Labs     11/24/21  1150 11/25/21  0322   WBC 10.9* 10.8   RBC 3.24* 2.97*   HEMOGLOBIN 10.2* 9.4*   HEMATOCRIT 32.1* 29.4*   MCV 99.1* 99.0*   MCH 31.5 31.6   MCHC 31.8* 32.0*   RDW 53.2* 53.7*   PLATELETCT 426 417   MPV 10.8 11.4     Recent Labs     11/24/21  1150 11/25/21  0322   SODIUM 129* 128*   POTASSIUM 4.0 3.6   CHLORIDE 92* 92*   CO2 25 24   GLUCOSE 163* 90   BUN 23* 20   CREATININE 0.90 0.89   CALCIUM 8.7 8.4     Recent Labs     11/24/21  1150   APTT 26.4   INR 0.85*               Imaging  CT-ABDOMEN-PELVIS WITH   Final Result      1.  There is a continued mixed density heterogeneous fluid collection with air seen in the gallbladder fossa. Again this is suspicious for abscess.   2.  The peripherally enhancing fluid collection in the anterior abdominal wall midline just deep to the rectus muscle is minimally decreased in size since 11/7/2021 as is the small peripherally enhancing fluid collection adjacent to the lateral segment    left hepatic lobe. These could be intra-abdominal abscesses versus postoperative seromas.   3.  Common bile duct stent is unchanged without biliary dilatation. Minimal pneumobilia is again noted.   4.  Diffuse mesenteric and body wall edema is again noted.      IR-CONSULT AND TREAT    (Results Pending)        Assessment/Plan  * Intractable pain- (present on admission)  Assessment & Plan  Patient presenting with intractable abdominal pain which persist after 2 IV medications provided at the emergency department  Secondary to chronic right upper quadrant postsurgical fluid collection, possible abscess  Pain regimen in place, required IV dilaudid    Type 1 diabetes mellitus on insulin therapy (HCC)- (present on admission)  Assessment & Plan  A1c 7.2 10/14/21  Diabetic diet  Continue home  long-acting insulin  Insulin sliding scale  Frequent Accu-Cheks    Constipation  Assessment & Plan  Patient stated she is constipated. CT A/P shows increased colonic stool burden. Patient on pain medications.  - continue BM regimen  - trial lactulose BID    Elevated LFTs- (present on admission)  Assessment & Plan  Secondary to chronic liver failure of unknown etiology  Trend    Right upper quadrant pain- (present on admission)  Assessment & Plan  Chronic and secondary to gallbladder fossa collection  Plan As above for intractable pain    Fluid collection at surgical site- (present on admission)  Assessment & Plan  Noted again on 11/24/2021 abdominal/pelvic CT  Contributing to intractable pain  IR consulted by admitting physician, fluid collection smaller than before and not recommending procedure.    Dehydration- (present on admission)  Assessment & Plan  Hyponatremia and hypochlorhydria as well as dry mucous membranes  IV hydration    Hyponatremia- (present on admission)  Assessment & Plan  Chronic and secondary to chronic liver disease of unknown etiology  IV hydration  Labs in a.m.    GERD (gastroesophageal reflux disease)- (present on admission)  Assessment & Plan  Continue home PPI    Anxiety- (present on admission)  Assessment & Plan  Continue home SSRI    Hypertension- (present on admission)  Assessment & Plan  Continue home metoprolol and spironolactone  As needed antihypertensives    Hypothyroidism, postsurgical- (present on admission)  Assessment & Plan  Continue home levothyroxine     VTE prophylaxis: SCDs/TEDs and enoxaparin ppx    I have performed a physical exam and reviewed and updated ROS and Plan today (11/26/2021). In review of yesterday's note (11/25/2021), there are no changes except as documented above.

## 2021-11-26 NOTE — CARE PLAN
The patient is Stable - Low risk of patient condition declining or worsening    Shift Goals  Clinical Goals: Pain control  Patient Goals: pain control, comfort     Progress made toward(s) clinical / shift goals:  Offer rest and repositioning. Medicate per MAR as needed.    Problem: Pain - Standard  Goal: Alleviation of pain or a reduction in pain to the patient’s comfort goal  Outcome: Progressing

## 2021-11-26 NOTE — DISCHARGE PLANNING
Anticipated Discharge Disposition: Home    Action: LSW completed chart review. Discussed pt in IDT rounds. Per MD, pt is clear to d/c tomorrow 10X10. No SW d/c needs reported or identified at this time.    Barriers to Discharge: None    Plan: LSW to follow and assist as needed.    Care Transition Team Assessment    Information Source  Orientation Level: Oriented X4  Information Given By:  (chart review)  Who is responsible for making decisions for patient? : Patient         Elopement Risk  Legal Hold: No  Ambulatory or Self Mobile in Wheelchair: Yes  Disoriented: No  Psychiatric Symptoms: None  History of Wandering: No  Elopement this Admit: No  Vocalizing Wanting to Leave: No  Displays Behaviors, Body Language Wanting to Leave: No-Not at Risk for Elopement  Elopement Risk: Not at Risk for Elopement    Interdisciplinary Discharge Planning  Primary Care Physician: Jarrell Yates MD  Patient or legal guardian wants to designate a caregiver: No    Discharge Preparedness  What is your plan after discharge?: Home with help  What are your discharge supports?: Child  Prior Functional Level: Ambulatory  Difficulity with ADLs: None  Difficulity with IADLs: None    Functional Assesment  Prior Functional Level: Ambulatory    Finances  Financial Barriers to Discharge: No    Vision / Hearing Impairment  Right Eye Vision: Impaired,Wears Glasses  Left Eye Vision: Impaired,Wears Glasses         Advance Directive  Advance Directive?: DPOA for Health Care  Durable Power of  Name and Contact : Farzaneh Angel [phone]    Domestic Abuse  Have you ever been the victim of abuse or violence?: No  Physical Abuse or Sexual Abuse: No  Verbal Abuse or Emotional Abuse: No  Possible Abuse/Neglect Reported to:: Not Applicable    Psychological Assessment  History of Substance Abuse: None    Discharge Risks or Barriers  Discharge risks or barriers?: No  Patient risk factors: Vulnerable adult    Anticipated Discharge Information  Discharge  Disposition: D/T to home/self-care w/planned acute care hosp IP readmit (81)  Discharge Address: [address] [place] [address] [place] [id number]

## 2021-11-27 VITALS
BODY MASS INDEX: 19.91 KG/M2 | TEMPERATURE: 98.2 F | WEIGHT: 123.9 LBS | RESPIRATION RATE: 18 BRPM | SYSTOLIC BLOOD PRESSURE: 160 MMHG | HEART RATE: 56 BPM | HEIGHT: 66 IN | DIASTOLIC BLOOD PRESSURE: 74 MMHG | OXYGEN SATURATION: 100 %

## 2021-11-27 PROBLEM — R10.11 RIGHT UPPER QUADRANT PAIN: Status: RESOLVED | Noted: 2021-11-10 | Resolved: 2021-11-27

## 2021-11-27 PROBLEM — E87.1 HYPONATREMIA: Status: RESOLVED | Noted: 2021-09-23 | Resolved: 2021-11-27

## 2021-11-27 PROBLEM — R52 INTRACTABLE PAIN: Status: RESOLVED | Noted: 2021-10-26 | Resolved: 2021-11-27

## 2021-11-27 PROBLEM — E86.0 DEHYDRATION: Status: RESOLVED | Noted: 2021-10-21 | Resolved: 2021-11-27

## 2021-11-27 LAB
GLUCOSE BLD-MCNC: 131 MG/DL (ref 65–99)
GLUCOSE BLD-MCNC: 155 MG/DL (ref 65–99)
GLUCOSE BLD-MCNC: 202 MG/DL (ref 65–99)

## 2021-11-27 PROCEDURE — A9270 NON-COVERED ITEM OR SERVICE: HCPCS | Performed by: INTERNAL MEDICINE

## 2021-11-27 PROCEDURE — 700102 HCHG RX REV CODE 250 W/ 637 OVERRIDE(OP): Performed by: STUDENT IN AN ORGANIZED HEALTH CARE EDUCATION/TRAINING PROGRAM

## 2021-11-27 PROCEDURE — G0378 HOSPITAL OBSERVATION PER HR: HCPCS

## 2021-11-27 PROCEDURE — A9270 NON-COVERED ITEM OR SERVICE: HCPCS | Performed by: STUDENT IN AN ORGANIZED HEALTH CARE EDUCATION/TRAINING PROGRAM

## 2021-11-27 PROCEDURE — 99217 PR OBSERVATION CARE DISCHARGE: CPT | Performed by: INTERNAL MEDICINE

## 2021-11-27 PROCEDURE — 82962 GLUCOSE BLOOD TEST: CPT | Mod: 91

## 2021-11-27 PROCEDURE — 96372 THER/PROPH/DIAG INJ SC/IM: CPT

## 2021-11-27 PROCEDURE — 700102 HCHG RX REV CODE 250 W/ 637 OVERRIDE(OP): Performed by: INTERNAL MEDICINE

## 2021-11-27 PROCEDURE — 700111 HCHG RX REV CODE 636 W/ 250 OVERRIDE (IP): Performed by: INTERNAL MEDICINE

## 2021-11-27 PROCEDURE — A9270 NON-COVERED ITEM OR SERVICE: HCPCS | Performed by: HOSPITALIST

## 2021-11-27 PROCEDURE — 96376 TX/PRO/DX INJ SAME DRUG ADON: CPT

## 2021-11-27 PROCEDURE — 700102 HCHG RX REV CODE 250 W/ 637 OVERRIDE(OP): Performed by: HOSPITALIST

## 2021-11-27 RX ORDER — NICOTINE 21 MG/24HR
14 PATCH, TRANSDERMAL 24 HOURS TRANSDERMAL
Status: DISCONTINUED | OUTPATIENT
Start: 2021-11-27 | End: 2021-11-27 | Stop reason: HOSPADM

## 2021-11-27 RX ORDER — AMOXICILLIN 250 MG
2 CAPSULE ORAL 2 TIMES DAILY PRN
Qty: 60 TABLET | Refills: 0 | Status: SHIPPED | OUTPATIENT
Start: 2021-11-27 | End: 2021-12-27

## 2021-11-27 RX ORDER — LACTULOSE 20 G/30ML
15 SOLUTION ORAL
Qty: 946 ML | Refills: 0 | Status: SHIPPED | OUTPATIENT
Start: 2021-11-27 | End: 2021-12-27

## 2021-11-27 RX ADMIN — SPIRONOLACTONE 25 MG: 25 TABLET ORAL at 05:22

## 2021-11-27 RX ADMIN — Medication 5000 UNITS: at 05:21

## 2021-11-27 RX ADMIN — INSULIN LISPRO 2 UNITS: 100 INJECTION, SOLUTION INTRAVENOUS; SUBCUTANEOUS at 11:29

## 2021-11-27 RX ADMIN — LEVOTHYROXINE SODIUM 25 MCG: 0.03 TABLET ORAL at 05:22

## 2021-11-27 RX ADMIN — HYDROMORPHONE HYDROCHLORIDE 0.5 MG: 1 INJECTION, SOLUTION INTRAMUSCULAR; INTRAVENOUS; SUBCUTANEOUS at 07:07

## 2021-11-27 RX ADMIN — METOPROLOL SUCCINATE 200 MG: 100 TABLET, EXTENDED RELEASE ORAL at 05:22

## 2021-11-27 RX ADMIN — OMEPRAZOLE 20 MG: 20 CAPSULE, DELAYED RELEASE ORAL at 05:22

## 2021-11-27 RX ADMIN — FUROSEMIDE 40 MG: 40 TABLET ORAL at 05:22

## 2021-11-27 RX ADMIN — NICOTINE 14 MG: 14 PATCH TRANSDERMAL at 05:21

## 2021-11-27 RX ADMIN — OXYCODONE HYDROCHLORIDE 10 MG: 10 TABLET ORAL at 04:43

## 2021-11-27 RX ADMIN — HYDROMORPHONE HYDROCHLORIDE 0.5 MG: 1 INJECTION, SOLUTION INTRAMUSCULAR; INTRAVENOUS; SUBCUTANEOUS at 01:08

## 2021-11-27 RX ADMIN — URSODIOL 300 MG: 300 CAPSULE ORAL at 05:22

## 2021-11-27 RX ADMIN — NICOTINE POLACRILEX 2 MG: 2 GUM, CHEWING BUCCAL at 05:21

## 2021-11-27 RX ADMIN — LEVOTHYROXINE SODIUM 200 MCG: 0.1 TABLET ORAL at 05:25

## 2021-11-27 RX ADMIN — OXYCODONE HYDROCHLORIDE 10 MG: 10 TABLET ORAL at 00:03

## 2021-11-27 RX ADMIN — PYRIDOXINE HCL TAB 50 MG 50 MG: 50 TAB at 05:22

## 2021-11-27 RX ADMIN — OXYCODONE HYDROCHLORIDE 10 MG: 10 TABLET ORAL at 11:30

## 2021-11-27 ASSESSMENT — PAIN DESCRIPTION - PAIN TYPE
TYPE: CHRONIC PAIN

## 2021-11-27 NOTE — PROGRESS NOTES
Discharge paper work reviewed with patient at bedside.Copy given.Questions encouraged and aswered. I.V removed. Patient escorted to ED entrance via wheelchair.Patient discharge to home

## 2021-11-27 NOTE — PROGRESS NOTES
Received patient from Night shift RN . Patient is awake and alert.No sign of distress. Patient denies any nausea.. Patient denies any pain as of the moment.  Fall precaution in placed, kept bed in lowest position and call light within reach.  Covid 19 surge in effect

## 2021-11-27 NOTE — DISCHARGE SUMMARY
Discharge Summary    CHIEF COMPLAINT ON ADMISSION  Chief Complaint   Patient presents with   • Abdominal Pain   • Nausea       Reason for Admission  Abdominal Pain, Back Pain     Admission Date  11/24/2021    CODE STATUS  DNAR/DNI    HPI & HOSPITAL COURSE  This is a 66-year-old female with past medical history of chronic liver disease unclear etiology, T1DM on home insulin, CAD with 2 prior stents, hypothyroidism, hypertension, recent discharge from hospital on 11/11/2021 for similar complaints of significant abdominal pain.  Admitted on 11/24/2024 recurrent intractable abdominal pain, primarily in the right upper quadrant.    Prior labs noted to have positive SCARLETT, speckled pattern, but negative rheumatologic antibody serologies including anti-mitochondrial-Ab, Anti-DNADs-Ab.    CT A/P appeared she had significant constipation.  Patient improved after trialing lactulose.  She had 3 BM overnight 11/26.  Patient is on lasix at home, suspecting she has become dehydrated.  Patient felt abdominal pain resolve.  Prescribed BM regimen for home, including lactulose.  No other medication changes to home meds, no opioids prescribed on this discharge.    Therefore, she is discharged in good and stable condition to home with close outpatient follow-up.    The patient recovered much more quickly than anticipated on admission.    Discharge Date  11/27/2021    FOLLOW UP ITEMS POST DISCHARGE  Listed below    DISCHARGE DIAGNOSES  Principal Problem (Resolved):    Intractable pain POA: Yes  Active Problems:    Type 1 diabetes mellitus on insulin therapy (HCC) (Chronic) POA: Yes      Overview: ICD-10 transition    Hypothyroidism, postsurgical POA: Yes    Hypertension POA: Yes    Anxiety POA: Yes    GERD (gastroesophageal reflux disease) POA: Yes    Fluid collection at surgical site POA: Yes    Elevated LFTs POA: Yes    Constipation POA: Clinically Undetermined  Resolved Problems:    Hyponatremia POA: Yes    Dehydration POA: Yes     Right upper quadrant pain POA: Yes      FOLLOW UP  No future appointments.  Jarrell Yates M.D.  [address] [facility] [facility]  [address]  [facility] [address]  [phone]    Schedule an appointment as soon as possible for a visit in 1 week  follow up for nutrition    [facility] GASTRO[facility]OLOG[facility]  [facility] [facility], [address]  [facility] [id number] [phone]  Go on 12/13/2021  continue appointment      MEDICATIONS ON DISCHARGE     Medication List      START taking these medications      Instructions   lactulose 20 GM/30ML Soln   Take 15 mL by mouth 1 time a day as needed (Constipation 3 or more days) for up to 30 days.  Dose: 15 mL     senna-docusate 8.6-50 MG Tabs  Commonly known as: PERICOLACE or SENOKOT S   Take 2 Tablets by mouth 2 times a day as needed for Constipation for up to 30 days.  Dose: 2 Tablet        CONTINUE taking these medications      Instructions   aspirin 81 MG EC tablet   Take 1 Tab by mouth every morning.  Dose: 81 mg     furosemide 40 MG Tabs  Commonly known as: LASIX   Take 1 Tablet by mouth every day.  Dose: 40 mg     gabapentin 300 MG Caps  Commonly known as: NEURONTIN   Take 1 Capsule by mouth 3 times a day as needed (restless leg syncrome).  Dose: 300 mg     metoprolol 200 MG XL tablet  Commonly known as: TOPROL-XL   Take 1 tablet by mouth every day.  Dose: 200 mg     NovoLOG FlexPen 100 UNIT/ML injection PEN  Generic drug: insulin aspart   Inject 1-5 Units under the skin in the morning, at noon, and at bedtime. 1 units for every 5 g of carbs   AND  Sliding Scale   150 - 200 = 1 units  201 - 250 = 2 units  251 - 300 = 3 units  301 - 350 = 4 units  351 - 400 = 5 units  Dose: 1-5 Units     ondansetron 4 MG Tbdp  Commonly known as: Zofran ODT   Take 1 Tablet by mouth every 6 hours as needed for Nausea.  Dose: 4 mg     oxyCODONE-acetaminophen  MG Tabs  Commonly known as: PERCOCET-10   Take 1 Tablet by mouth every 6 hours as needed. Indications: Pain  Dose: 1 Tablet     pantoprazole 40 MG  Tbec  Commonly known as: PROTONIX   Take 40 mg by mouth every morning.  Dose: 40 mg     pyridoxine 50 MG Tabs  Commonly known as: VITAMIN B-6   Take 50 mg by mouth every morning.  Dose: 50 mg     QC Vitamin D3 5000 Unit (125 mcg) Tabs  Generic drug: vitamin D3   Take 5,000 Units by mouth 2 times a day.  Dose: 5,000 Units     sertraline 100 MG Tabs  Commonly known as: Zoloft   Take 100 mg by mouth every evening.  Dose: 100 mg     spironolactone 25 MG Tabs  Commonly known as: ALDACTONE   Take 1 Tablet by mouth every day.  Dose: 25 mg     STOOL SOFTENER PO   Take 1 Tablet by mouth every evening.  Dose: 1 Tablet     * levothyroxine 25 MCG Tabs  Commonly known as: SYNTHROID   Take 25 mcg by mouth every morning on an empty stomach. 25mcg tablet + 200mcg tablet for a total dose of 225mcg  Dose: 25 mcg     * Synthroid 200 MCG Tabs  Generic drug: levothyroxine   Take 200 mcg by mouth every morning. 200mcg tablet + 25mcg tablet for a total dose of 225mcg  Dose: 200 mcg     traZODone 100 MG Tabs  Commonly known as: DESYREL   Take 100 mg by mouth at bedtime.  Dose: 100 mg     Tresiba FlexTouch 100 UNIT/ML Sopn  Generic drug: Insulin Degludec   Inject 23 Units under the skin every evening.  Dose: 23 Units     ursodiol 300 MG Caps  Commonly known as: ACTIGALL   Take 1 Capsule by mouth every day.  Dose: 300 mg     VITAMIN B COMPLEX PO   Take 1 Tablet by mouth every morning.  Dose: 1 Tablet     VITAMIN C PO   Take 1 Tablet by mouth every morning.  Dose: 1 Tablet         * This list has 2 medication(s) that are the same as other medications prescribed for you. Read the directions carefully, and ask your doctor or other care provider to review them with you.                Allergies  Allergies   Allergen Reactions   • Ativan Unspecified     Patient reports that she had ativan removed in the past. Reports leg jumping.    • Tape Unspecified     Blisters, paper tape is ok       DIET  Orders Placed This Encounter   Procedures   • Diet  Order Diet: Consistent CHO (Diabetic)     Standing Status:   Standing     Number of Occurrences:   1     Order Specific Question:   Diet:     Answer:   Consistent CHO (Diabetic) [4]       ACTIVITY  As tolerated.  Weight bearing as tolerated    CONSULTATIONS  none    PROCEDURES  none    LABORATORY  Lab Results   Component Value Date    SODIUM 128 (L) 11/25/2021    POTASSIUM 3.6 11/25/2021    CHLORIDE 92 (L) 11/25/2021    CO2 24 11/25/2021    GLUCOSE 90 11/25/2021    BUN 20 11/25/2021    CREATININE 0.89 11/25/2021    CREATININE 1.0 01/08/2009        Lab Results   Component Value Date    WBC 10.8 11/25/2021    HEMOGLOBIN 9.4 (L) 11/25/2021    HEMATOCRIT 29.4 (L) 11/25/2021    PLATELETCT 417 11/25/2021        Total time of the discharge process exceeds 25 minutes.  More than 50% of time was spent face to face with patient.  This included but not limited to review of hospital course with patient, treatment goals upon discharge, recommendations to PCP, continued and new medications and their adverse reactions and nursing instructions for patient.

## 2021-11-27 NOTE — DISCHARGE INSTRUCTIONS
Discharge Instructions    Discharged to home by car with relative. Discharged via wheelchair, hospital escort: Yes.  Special equipment needed: Not Applicable    Be sure to schedule a follow-up appointment with your primary care doctor or any specialists as instructed.     Discharge Plan:   Diet Plan: Discussed  Activity Level: Discussed  Confirmed Follow up Appointment: Patient to Call and Schedule Appointment  Confirmed Symptoms Management: Discussed  Medication Reconciliation Updated: Yes  Influenza Vaccine Indication: Patient Refuses    I understand that a diet low in cholesterol, fat, and sodium is recommended for good health. Unless I have been given specific instructions below for another diet, I accept this instruction as my diet prescription.   Other diet: Diabetic Carbohydrates control    Special Instructions: None    · Is patient discharged on Warfarin / Coumadin?   No     Depression / Suicide Risk    As you are discharged from this [facility], it is important to learn how to keep safe from harming yourself.    Recognize the warning signs:  · Abrupt changes in personality, positive or negative- including increase in energy   · Giving away possessions  · Change in eating patterns- significant weight changes-  positive or negative  · Change in sleeping patterns- unable to sleep or sleeping all the time   · Unwillingness or inability to communicate  · Depression  · Unusual sadness, discouragement and loneliness  · Talk of wanting to die  · Neglect of personal appearance   · Rebelliousness- reckless behavior  · Withdrawal from people/activities they love  · Confusion- inability to concentrate     If you or a loved one observes any of these behaviors or has concerns about self-harm, here's what you can do:  · Talk about it- your feelings and reasons for harming yourself  · Remove any means that you might use to hurt yourself (examples: pills, rope, extension cords, firearm)  · Get professional help  from the community (Mental Health, Substance Abuse, psychological counseling)  · Do not be alone:Call your Safe Contact- someone whom you trust who will be there for you.  · Call your local CRISIS HOTLINE 018-9328 or 681-265-0936  · Call your local Children's Mobile Crisis Response Team [place] (145) 224-4590 or www.Easy Pairings  · Call the toll free National Suicide Prevention Hotlines   · National Suicide Prevention Lifeline 791-024-PGGC (3349)  · [employer] Line Network 800-SUICIDE (361-9117)            FOLLOW UP  No future appointments.  Jarrell Yates M.D.  [address] [facility]  [address]  [facility] [address]  [phone]     Schedule an appointment as soon as possible for a visit in 1 week  follow up for nutrition     [facility] GASTROENTEROLOGY  [address] [place] [address] [address] [place] [id number] [phone]  Go on 12/13/2021  continue appointment

## 2021-11-27 NOTE — CARE PLAN
The patient is Stable - Low risk of patient condition declining or worsening    Shift Goals  Clinical Goals: Pain control, anxiety reduction  Patient Goals: Pain control, sleep comfortably     Progress made toward(s) clinical / shift goals:  Progressing    Patient is not progressing towards the following goals:N/A    Met with patient at bedside. Patient is awake and alert and states that she is in a lot of pain. Will medicate per MAR. Patient is visibly anxious and has her emotional support dog with her. No other needs at this time. Will continue to monitor.

## 2021-11-27 NOTE — PROGRESS NOTES
Received report from Carin.  Patient is not expressing any needs at the moment. Safety precautions in place. Assumed care of patient.

## 2021-11-27 NOTE — DISCHARGE PLANNING
Anticipated Discharge Disposition:   Home when medically cleared     Action:   Chart review complete.     Discussed patient's plan of care and plans for discharge during rounds. Per MD, patient to discharge to home today.     No discharge needs anticipated. RN CM will continue to follow as needed.     Barriers to Discharge:   None     Plan:   Hospital care management will continue to assist with discharge planning needs.

## 2021-12-22 ENCOUNTER — HOSPITAL ENCOUNTER (OUTPATIENT)
Dept: LAB | Facility: MEDICAL CENTER | Age: 66
End: 2021-12-22
Attending: NURSE PRACTITIONER
Payer: MEDICARE

## 2021-12-22 LAB
ALBUMIN SERPL BCP-MCNC: 3.7 G/DL (ref 3.2–4.9)
ALBUMIN/GLOB SERPL: 1.2 G/DL
ALP SERPL-CCNC: 581 U/L (ref 30–99)
ALT SERPL-CCNC: 52 U/L (ref 2–50)
ANION GAP SERPL CALC-SCNC: 11 MMOL/L (ref 7–16)
APTT PPP: 27.3 SEC (ref 24.7–36)
AST SERPL-CCNC: 52 U/L (ref 12–45)
BASOPHILS # BLD AUTO: 1.8 % (ref 0–1.8)
BASOPHILS # BLD: 0.16 K/UL (ref 0–0.12)
BILIRUB SERPL-MCNC: 1.4 MG/DL (ref 0.1–1.5)
BUN SERPL-MCNC: 29 MG/DL (ref 8–22)
CALCIUM SERPL-MCNC: 8.8 MG/DL (ref 8.5–10.5)
CHLORIDE SERPL-SCNC: 99 MMOL/L (ref 96–112)
CO2 SERPL-SCNC: 24 MMOL/L (ref 20–33)
CREAT SERPL-MCNC: 1.27 MG/DL (ref 0.5–1.4)
EOSINOPHIL # BLD AUTO: 0.23 K/UL (ref 0–0.51)
EOSINOPHIL NFR BLD: 2.6 % (ref 0–6.9)
ERYTHROCYTE [DISTWIDTH] IN BLOOD BY AUTOMATED COUNT: 51.8 FL (ref 35.9–50)
GLOBULIN SER CALC-MCNC: 3.1 G/DL (ref 1.9–3.5)
GLUCOSE SERPL-MCNC: 275 MG/DL (ref 65–99)
HCT VFR BLD AUTO: 35 % (ref 37–47)
HGB BLD-MCNC: 11.3 G/DL (ref 12–16)
IMM GRANULOCYTES # BLD AUTO: 0.02 K/UL (ref 0–0.11)
IMM GRANULOCYTES NFR BLD AUTO: 0.2 % (ref 0–0.9)
INR PPP: 0.86 (ref 0.87–1.13)
LYMPHOCYTES # BLD AUTO: 1.49 K/UL (ref 1–4.8)
LYMPHOCYTES NFR BLD: 16.6 % (ref 22–41)
MCH RBC QN AUTO: 30.9 PG (ref 27–33)
MCHC RBC AUTO-ENTMCNC: 32.3 G/DL (ref 33.6–35)
MCV RBC AUTO: 95.6 FL (ref 81.4–97.8)
MONOCYTES # BLD AUTO: 1 K/UL (ref 0–0.85)
MONOCYTES NFR BLD AUTO: 11.2 % (ref 0–13.4)
NEUTROPHILS # BLD AUTO: 6.06 K/UL (ref 2–7.15)
NEUTROPHILS NFR BLD: 67.6 % (ref 44–72)
NRBC # BLD AUTO: 0 K/UL
NRBC BLD-RTO: 0 /100 WBC
PLATELET # BLD AUTO: 297 K/UL (ref 164–446)
PMV BLD AUTO: 11.2 FL (ref 9–12.9)
POTASSIUM SERPL-SCNC: 4.6 MMOL/L (ref 3.6–5.5)
PROT SERPL-MCNC: 6.8 G/DL (ref 6–8.2)
PROTHROMBIN TIME: 11.5 SEC (ref 12–14.6)
RBC # BLD AUTO: 3.66 M/UL (ref 4.2–5.4)
SODIUM SERPL-SCNC: 134 MMOL/L (ref 135–145)
WBC # BLD AUTO: 9 K/UL (ref 4.8–10.8)

## 2021-12-22 PROCEDURE — 85730 THROMBOPLASTIN TIME PARTIAL: CPT

## 2021-12-22 PROCEDURE — 36415 COLL VENOUS BLD VENIPUNCTURE: CPT

## 2021-12-22 PROCEDURE — 85025 COMPLETE CBC W/AUTO DIFF WBC: CPT

## 2021-12-22 PROCEDURE — 80053 COMPREHEN METABOLIC PANEL: CPT

## 2021-12-22 PROCEDURE — 85610 PROTHROMBIN TIME: CPT

## 2022-01-03 ENCOUNTER — PRE-ADMISSION TESTING (OUTPATIENT)
Dept: ADMISSIONS | Facility: MEDICAL CENTER | Age: 67
End: 2022-01-03
Attending: INTERNAL MEDICINE
Payer: MEDICARE

## 2022-01-03 DIAGNOSIS — Z01.812 PRE-OPERATIVE LABORATORY EXAMINATION: ICD-10-CM

## 2022-01-03 LAB
EST. AVERAGE GLUCOSE BLD GHB EST-MCNC: 177 MG/DL
HBA1C MFR BLD: 7.8 % (ref 4–5.6)

## 2022-01-03 PROCEDURE — 83036 HEMOGLOBIN GLYCOSYLATED A1C: CPT

## 2022-01-03 PROCEDURE — 36415 COLL VENOUS BLD VENIPUNCTURE: CPT

## 2022-01-03 RX ORDER — METOPROLOL SUCCINATE 50 MG/1
50 TABLET, EXTENDED RELEASE ORAL EVERY MORNING
Status: ON HOLD | COMMUNITY
Start: 2021-12-29 | End: 2024-03-01

## 2022-01-03 RX ORDER — AMLODIPINE BESYLATE 10 MG/1
TABLET ORAL
COMMUNITY
Start: 2021-12-28 | End: 2022-01-03

## 2022-01-03 RX ORDER — OXYCODONE HYDROCHLORIDE 10 MG/1
10 TABLET ORAL EVERY MORNING
COMMUNITY
Start: 2021-12-22 | End: 2022-07-10

## 2022-01-03 RX ORDER — MORPHINE SULFATE 15 MG/1
15 TABLET, FILM COATED, EXTENDED RELEASE ORAL EVERY 12 HOURS
COMMUNITY
Start: 2021-12-20 | End: 2022-07-10

## 2022-01-03 RX ORDER — METOPROLOL SUCCINATE 100 MG/1
100 TABLET, EXTENDED RELEASE ORAL EVERY MORNING
Status: ON HOLD | COMMUNITY
Start: 2021-12-29 | End: 2024-03-01

## 2022-01-03 RX ORDER — EZETIMIBE 10 MG/1
10 TABLET ORAL EVERY EVENING
COMMUNITY
Start: 2021-12-22 | End: 2024-02-14

## 2022-01-03 ASSESSMENT — FIBROSIS 4 INDEX: FIB4 SCORE: 1.55

## 2022-01-03 NOTE — PREPROCEDURE INSTRUCTIONS
Pre-admit appointment completed. Pt states all instructions given are understood and to call pre-admit or Dr's office for additional questions or any symptoms of illness/covid develop prior to DOS. Medications the patient will take the morning of surgery per anesthesia protocol:  Synthroid, Metoprolol, Morphine, Oxycodone, Ursodiol. Instructed to take other prescribed medications through the day before surgery.

## 2022-01-07 ENCOUNTER — APPOINTMENT (OUTPATIENT)
Dept: ADMISSIONS | Facility: MEDICAL CENTER | Age: 67
End: 2022-01-07
Attending: INTERNAL MEDICINE
Payer: MEDICARE

## 2022-01-07 DIAGNOSIS — Z01.812 PRE-OPERATIVE LABORATORY EXAMINATION: ICD-10-CM

## 2022-01-07 PROCEDURE — U0003 INFECTIOUS AGENT DETECTION BY NUCLEIC ACID (DNA OR RNA); SEVERE ACUTE RESPIRATORY SYNDROME CORONAVIRUS 2 (SARS-COV-2) (CORONAVIRUS DISEASE [COVID-19]), AMPLIFIED PROBE TECHNIQUE, MAKING USE OF HIGH THROUGHPUT TECHNOLOGIES AS DESCRIBED BY CMS-2020-01-R: HCPCS

## 2022-01-07 PROCEDURE — U0005 INFEC AGEN DETEC AMPLI PROBE: HCPCS

## 2022-01-07 PROCEDURE — C9803 HOPD COVID-19 SPEC COLLECT: HCPCS

## 2022-01-08 LAB
SARS-COV-2 RNA RESP QL NAA+PROBE: NOTDETECTED
SPECIMEN SOURCE: NORMAL

## 2022-01-14 ENCOUNTER — APPOINTMENT (OUTPATIENT)
Dept: RADIOLOGY | Facility: MEDICAL CENTER | Age: 67
End: 2022-01-14
Attending: INTERNAL MEDICINE
Payer: MEDICARE

## 2022-01-14 ENCOUNTER — ANESTHESIA (OUTPATIENT)
Dept: SURGERY | Facility: MEDICAL CENTER | Age: 67
End: 2022-01-14
Payer: MEDICARE

## 2022-01-14 ENCOUNTER — HOSPITAL ENCOUNTER (OUTPATIENT)
Facility: MEDICAL CENTER | Age: 67
End: 2022-01-14
Attending: INTERNAL MEDICINE | Admitting: INTERNAL MEDICINE
Payer: MEDICARE

## 2022-01-14 ENCOUNTER — ANESTHESIA EVENT (OUTPATIENT)
Dept: SURGERY | Facility: MEDICAL CENTER | Age: 67
End: 2022-01-14
Payer: MEDICARE

## 2022-01-14 VITALS
DIASTOLIC BLOOD PRESSURE: 83 MMHG | WEIGHT: 117.06 LBS | SYSTOLIC BLOOD PRESSURE: 138 MMHG | BODY MASS INDEX: 18.81 KG/M2 | RESPIRATION RATE: 16 BRPM | HEART RATE: 81 BPM | OXYGEN SATURATION: 98 % | HEIGHT: 66 IN | TEMPERATURE: 97 F

## 2022-01-14 DIAGNOSIS — Z01.812 PRE-OPERATIVE LABORATORY EXAMINATION: ICD-10-CM

## 2022-01-14 PROBLEM — K72.90 LIVER FAILURE WITHOUT HEPATIC COMA (HCC): Status: ACTIVE | Noted: 2022-01-14

## 2022-01-14 PROBLEM — N18.30 CKD (CHRONIC KIDNEY DISEASE) STAGE 3, GFR 30-59 ML/MIN: Status: RESOLVED | Noted: 2020-06-23 | Resolved: 2022-01-14

## 2022-01-14 LAB
ALBUMIN SERPL BCP-MCNC: 3.4 G/DL (ref 3.2–4.9)
ALBUMIN/GLOB SERPL: 1.1 G/DL
ALP SERPL-CCNC: 591 U/L (ref 30–99)
ALT SERPL-CCNC: 79 U/L (ref 2–50)
ANION GAP SERPL CALC-SCNC: 9 MMOL/L (ref 7–16)
AST SERPL-CCNC: 68 U/L (ref 12–45)
BASOPHILS # BLD AUTO: 1.8 % (ref 0–1.8)
BASOPHILS # BLD: 0.11 K/UL (ref 0–0.12)
BILIRUB SERPL-MCNC: 1 MG/DL (ref 0.1–1.5)
BUN SERPL-MCNC: 25 MG/DL (ref 8–22)
CALCIUM SERPL-MCNC: 8.7 MG/DL (ref 8.4–10.2)
CHLORIDE SERPL-SCNC: 106 MMOL/L (ref 96–112)
CO2 SERPL-SCNC: 25 MMOL/L (ref 20–33)
CREAT SERPL-MCNC: 0.97 MG/DL (ref 0.5–1.4)
EOSINOPHIL # BLD AUTO: 0.17 K/UL (ref 0–0.51)
EOSINOPHIL NFR BLD: 2.7 % (ref 0–6.9)
ERYTHROCYTE [DISTWIDTH] IN BLOOD BY AUTOMATED COUNT: 55 FL (ref 35.9–50)
GLOBULIN SER CALC-MCNC: 3.1 G/DL (ref 1.9–3.5)
GLUCOSE BLD-MCNC: 105 MG/DL (ref 65–99)
GLUCOSE SERPL-MCNC: 109 MG/DL (ref 65–99)
HCT VFR BLD AUTO: 34.5 % (ref 37–47)
HGB BLD-MCNC: 11.2 G/DL (ref 12–16)
IMM GRANULOCYTES # BLD AUTO: 0.02 K/UL (ref 0–0.11)
IMM GRANULOCYTES NFR BLD AUTO: 0.3 % (ref 0–0.9)
INR PPP: 0.85 (ref 0.87–1.13)
LIPASE SERPL-CCNC: 9 U/L (ref 7–58)
LYMPHOCYTES # BLD AUTO: 1.07 K/UL (ref 1–4.8)
LYMPHOCYTES NFR BLD: 17.2 % (ref 22–41)
MCH RBC QN AUTO: 30.8 PG (ref 27–33)
MCHC RBC AUTO-ENTMCNC: 32.5 G/DL (ref 33.6–35)
MCV RBC AUTO: 94.8 FL (ref 81.4–97.8)
MONOCYTES # BLD AUTO: 0.7 K/UL (ref 0–0.85)
MONOCYTES NFR BLD AUTO: 11.3 % (ref 0–13.4)
NEUTROPHILS # BLD AUTO: 4.14 K/UL (ref 2–7.15)
NEUTROPHILS NFR BLD: 66.7 % (ref 44–72)
NRBC # BLD AUTO: 0 K/UL
NRBC BLD-RTO: 0 /100 WBC
PATHOLOGY CONSULT NOTE: NORMAL
PLATELET # BLD AUTO: 332 K/UL (ref 164–446)
PMV BLD AUTO: 9.9 FL (ref 9–12.9)
POTASSIUM SERPL-SCNC: 3.7 MMOL/L (ref 3.6–5.5)
PROT SERPL-MCNC: 6.5 G/DL (ref 6–8.2)
PROTHROMBIN TIME: 10.9 SEC (ref 12–14.6)
RBC # BLD AUTO: 3.64 M/UL (ref 4.2–5.4)
SODIUM SERPL-SCNC: 140 MMOL/L (ref 135–145)
WBC # BLD AUTO: 6.2 K/UL (ref 4.8–10.8)

## 2022-01-14 PROCEDURE — 80053 COMPREHEN METABOLIC PANEL: CPT

## 2022-01-14 PROCEDURE — 700105 HCHG RX REV CODE 258: Performed by: INTERNAL MEDICINE

## 2022-01-14 PROCEDURE — 700102 HCHG RX REV CODE 250 W/ 637 OVERRIDE(OP): Performed by: INTERNAL MEDICINE

## 2022-01-14 PROCEDURE — 88305 TISSUE EXAM BY PATHOLOGIST: CPT

## 2022-01-14 PROCEDURE — 160002 HCHG RECOVERY MINUTES (STAT): Performed by: INTERNAL MEDICINE

## 2022-01-14 PROCEDURE — 85610 PROTHROMBIN TIME: CPT

## 2022-01-14 PROCEDURE — 700101 HCHG RX REV CODE 250: Performed by: STUDENT IN AN ORGANIZED HEALTH CARE EDUCATION/TRAINING PROGRAM

## 2022-01-14 PROCEDURE — 83690 ASSAY OF LIPASE: CPT

## 2022-01-14 PROCEDURE — 160009 HCHG ANES TIME/MIN: Performed by: INTERNAL MEDICINE

## 2022-01-14 PROCEDURE — 82962 GLUCOSE BLOOD TEST: CPT

## 2022-01-14 PROCEDURE — 160048 HCHG OR STATISTICAL LEVEL 1-5: Performed by: INTERNAL MEDICINE

## 2022-01-14 PROCEDURE — 110371 HCHG SHELL REV 272: Performed by: INTERNAL MEDICINE

## 2022-01-14 PROCEDURE — 160035 HCHG PACU - 1ST 60 MINS PHASE I: Performed by: INTERNAL MEDICINE

## 2022-01-14 PROCEDURE — A9270 NON-COVERED ITEM OR SERVICE: HCPCS | Performed by: INTERNAL MEDICINE

## 2022-01-14 PROCEDURE — 160202 HCHG ENDO MINUTES - 1ST 30 MINS LEVEL 3: Performed by: INTERNAL MEDICINE

## 2022-01-14 PROCEDURE — 160046 HCHG PACU - 1ST 60 MINS PHASE II: Performed by: INTERNAL MEDICINE

## 2022-01-14 PROCEDURE — 160025 RECOVERY II MINUTES (STATS): Performed by: INTERNAL MEDICINE

## 2022-01-14 PROCEDURE — 85025 COMPLETE CBC W/AUTO DIFF WBC: CPT

## 2022-01-14 PROCEDURE — 700101 HCHG RX REV CODE 250: Performed by: INTERNAL MEDICINE

## 2022-01-14 PROCEDURE — 700111 HCHG RX REV CODE 636 W/ 250 OVERRIDE (IP): Performed by: STUDENT IN AN ORGANIZED HEALTH CARE EDUCATION/TRAINING PROGRAM

## 2022-01-14 RX ORDER — HYDROMORPHONE HYDROCHLORIDE 1 MG/ML
0.4 INJECTION, SOLUTION INTRAMUSCULAR; INTRAVENOUS; SUBCUTANEOUS
Status: DISCONTINUED | OUTPATIENT
Start: 2022-01-14 | End: 2022-01-14 | Stop reason: HOSPADM

## 2022-01-14 RX ORDER — HALOPERIDOL 5 MG/ML
1 INJECTION INTRAMUSCULAR
Status: DISCONTINUED | OUTPATIENT
Start: 2022-01-14 | End: 2022-01-14 | Stop reason: HOSPADM

## 2022-01-14 RX ORDER — IPRATROPIUM BROMIDE AND ALBUTEROL SULFATE 2.5; .5 MG/3ML; MG/3ML
3 SOLUTION RESPIRATORY (INHALATION)
Status: DISCONTINUED | OUTPATIENT
Start: 2022-01-14 | End: 2022-01-14 | Stop reason: HOSPADM

## 2022-01-14 RX ORDER — DEXTROSE MONOHYDRATE 25 G/50ML
50 INJECTION, SOLUTION INTRAVENOUS
Status: DISCONTINUED | OUTPATIENT
Start: 2022-01-14 | End: 2022-01-14 | Stop reason: HOSPADM

## 2022-01-14 RX ORDER — ONDANSETRON 2 MG/ML
4 INJECTION INTRAMUSCULAR; INTRAVENOUS
Status: DISCONTINUED | OUTPATIENT
Start: 2022-01-14 | End: 2022-01-14 | Stop reason: HOSPADM

## 2022-01-14 RX ORDER — MEPERIDINE HYDROCHLORIDE 25 MG/ML
12.5 INJECTION INTRAMUSCULAR; INTRAVENOUS; SUBCUTANEOUS
Status: DISCONTINUED | OUTPATIENT
Start: 2022-01-14 | End: 2022-01-14 | Stop reason: HOSPADM

## 2022-01-14 RX ORDER — METOPROLOL TARTRATE 1 MG/ML
1 INJECTION, SOLUTION INTRAVENOUS
Status: DISCONTINUED | OUTPATIENT
Start: 2022-01-14 | End: 2022-01-14 | Stop reason: HOSPADM

## 2022-01-14 RX ORDER — SODIUM CHLORIDE, SODIUM LACTATE, POTASSIUM CHLORIDE, CALCIUM CHLORIDE 600; 310; 30; 20 MG/100ML; MG/100ML; MG/100ML; MG/100ML
INJECTION, SOLUTION INTRAVENOUS CONTINUOUS
Status: DISCONTINUED | OUTPATIENT
Start: 2022-01-14 | End: 2022-01-14 | Stop reason: HOSPADM

## 2022-01-14 RX ORDER — MIDAZOLAM HYDROCHLORIDE 1 MG/ML
INJECTION INTRAMUSCULAR; INTRAVENOUS PRN
Status: DISCONTINUED | OUTPATIENT
Start: 2022-01-14 | End: 2022-01-14 | Stop reason: SURG

## 2022-01-14 RX ORDER — ONDANSETRON 2 MG/ML
INJECTION INTRAMUSCULAR; INTRAVENOUS PRN
Status: DISCONTINUED | OUTPATIENT
Start: 2022-01-14 | End: 2022-01-14 | Stop reason: SURG

## 2022-01-14 RX ORDER — LABETALOL HYDROCHLORIDE 5 MG/ML
5 INJECTION, SOLUTION INTRAVENOUS
Status: DISCONTINUED | OUTPATIENT
Start: 2022-01-14 | End: 2022-01-14 | Stop reason: HOSPADM

## 2022-01-14 RX ORDER — LIDOCAINE HYDROCHLORIDE 20 MG/ML
INJECTION, SOLUTION EPIDURAL; INFILTRATION; INTRACAUDAL; PERINEURAL PRN
Status: DISCONTINUED | OUTPATIENT
Start: 2022-01-14 | End: 2022-01-14 | Stop reason: SURG

## 2022-01-14 RX ORDER — OXYCODONE HCL 5 MG/5 ML
10 SOLUTION, ORAL ORAL
Status: DISCONTINUED | OUTPATIENT
Start: 2022-01-14 | End: 2022-01-14 | Stop reason: HOSPADM

## 2022-01-14 RX ORDER — INDOMETHACIN 50 MG/1
100 SUPPOSITORY RECTAL ONCE
Status: COMPLETED | OUTPATIENT
Start: 2022-01-14 | End: 2022-01-14

## 2022-01-14 RX ORDER — LIDOCAINE HYDROCHLORIDE 40 MG/ML
SOLUTION TOPICAL PRN
Status: DISCONTINUED | OUTPATIENT
Start: 2022-01-14 | End: 2022-01-14 | Stop reason: SURG

## 2022-01-14 RX ORDER — HYDROMORPHONE HYDROCHLORIDE 1 MG/ML
0.1 INJECTION, SOLUTION INTRAMUSCULAR; INTRAVENOUS; SUBCUTANEOUS
Status: DISCONTINUED | OUTPATIENT
Start: 2022-01-14 | End: 2022-01-14 | Stop reason: HOSPADM

## 2022-01-14 RX ORDER — DIPHENHYDRAMINE HYDROCHLORIDE 50 MG/ML
12.5 INJECTION INTRAMUSCULAR; INTRAVENOUS
Status: DISCONTINUED | OUTPATIENT
Start: 2022-01-14 | End: 2022-01-14 | Stop reason: HOSPADM

## 2022-01-14 RX ORDER — HYDROMORPHONE HYDROCHLORIDE 1 MG/ML
0.2 INJECTION, SOLUTION INTRAMUSCULAR; INTRAVENOUS; SUBCUTANEOUS
Status: DISCONTINUED | OUTPATIENT
Start: 2022-01-14 | End: 2022-01-14 | Stop reason: HOSPADM

## 2022-01-14 RX ORDER — DEXAMETHASONE SODIUM PHOSPHATE 4 MG/ML
INJECTION, SOLUTION INTRA-ARTICULAR; INTRALESIONAL; INTRAMUSCULAR; INTRAVENOUS; SOFT TISSUE PRN
Status: DISCONTINUED | OUTPATIENT
Start: 2022-01-14 | End: 2022-01-14 | Stop reason: SURG

## 2022-01-14 RX ORDER — OXYCODONE HCL 5 MG/5 ML
5 SOLUTION, ORAL ORAL
Status: DISCONTINUED | OUTPATIENT
Start: 2022-01-14 | End: 2022-01-14 | Stop reason: HOSPADM

## 2022-01-14 RX ORDER — HYDRALAZINE HYDROCHLORIDE 20 MG/ML
5 INJECTION INTRAMUSCULAR; INTRAVENOUS
Status: DISCONTINUED | OUTPATIENT
Start: 2022-01-14 | End: 2022-01-14 | Stop reason: HOSPADM

## 2022-01-14 RX ORDER — KETAMINE HYDROCHLORIDE 50 MG/ML
INJECTION, SOLUTION INTRAMUSCULAR; INTRAVENOUS PRN
Status: DISCONTINUED | OUTPATIENT
Start: 2022-01-14 | End: 2022-01-14 | Stop reason: SURG

## 2022-01-14 RX ADMIN — LIDOCAINE HYDROCHLORIDE 4 ML: 40 SOLUTION TOPICAL at 10:25

## 2022-01-14 RX ADMIN — PROPOFOL 100 MG: 10 INJECTION, EMULSION INTRAVENOUS at 10:25

## 2022-01-14 RX ADMIN — LIDOCAINE HYDROCHLORIDE 60 MG: 20 INJECTION, SOLUTION EPIDURAL; INFILTRATION; INTRACAUDAL; PERINEURAL at 10:25

## 2022-01-14 RX ADMIN — MIDAZOLAM HYDROCHLORIDE 2 MG: 1 INJECTION, SOLUTION INTRAMUSCULAR; INTRAVENOUS at 10:21

## 2022-01-14 RX ADMIN — KETAMINE HYDROCHLORIDE 50 MG: 50 INJECTION INTRAMUSCULAR; INTRAVENOUS at 10:25

## 2022-01-14 RX ADMIN — DEXAMETHASONE SODIUM PHOSPHATE 4 MG: 4 INJECTION, SOLUTION INTRAMUSCULAR; INTRAVENOUS at 10:29

## 2022-01-14 RX ADMIN — FENTANYL CITRATE 100 MCG: 50 INJECTION, SOLUTION INTRAMUSCULAR; INTRAVENOUS at 10:22

## 2022-01-14 RX ADMIN — SUCCINYLCHOLINE CHLORIDE 80 MG: 20 INJECTION, SOLUTION INTRAMUSCULAR; INTRAVENOUS at 10:25

## 2022-01-14 RX ADMIN — LIDOCAINE HYDROCHLORIDE 0.5 ML: 10 INJECTION, SOLUTION EPIDURAL; INFILTRATION; INTRACAUDAL; PERINEURAL at 09:12

## 2022-01-14 RX ADMIN — INDOMETHACIN 100 MG: 50 SUPPOSITORY RECTAL at 11:50

## 2022-01-14 RX ADMIN — ONDANSETRON 4 MG: 2 INJECTION INTRAMUSCULAR; INTRAVENOUS at 10:33

## 2022-01-14 RX ADMIN — SODIUM CHLORIDE, POTASSIUM CHLORIDE, SODIUM LACTATE AND CALCIUM CHLORIDE: 600; 310; 30; 20 INJECTION, SOLUTION INTRAVENOUS at 09:11

## 2022-01-14 ASSESSMENT — FIBROSIS 4 INDEX: FIB4 SCORE: 1.55

## 2022-01-14 ASSESSMENT — PAIN SCALES - GENERAL: PAIN_LEVEL: 0

## 2022-01-14 NOTE — PROCEDURES
Endoscopic Retrograde Cholangiopancreatography    Date of Procedure:  1/14/2022  Attending Physician:  Cr Haro MD  Indications: Elevated LFT; in-situ biliary stent      Instrument: Olympus Flexible Sideviewing Endoscope  Sedation:   Anesthesiologist/Type of Anesthesia:  Anesthesiologist: Morgan Temple M.D./General    Surgical Staff:  Circulator: Robert Hernandez R.N.  Endoscopy Technician: Mami Perez  Radiology Technologist: Hilary Carlisle    Specimens removed if any:  ID Type Source Tests Collected by Time Destination   A : Biopsy- Papilla Other Other PATHOLOGY SPECIMEN Cr Haro M.D. 1/14/2022 10:39 AM          Pre-Anesthesia Assessment:  Prior to the procedure, a History and Physical was performed, and patient medications and allergies were reviewed. The patient’s tolerance of previous anesthesia was also reviewed. The risks and benefits of the procedure and the sedation options and risks were discussed with the patient including but not limited to infection, bleeding, aspiration, perforation, adverse medication reaction, missed diagnosis, missed lesions, and pancreatitis. The patient verbalized understanding. All questions were answered, and informed consent was obtained    After I obtained informed consent from the patient, the patient was placed in the prone/swimmer position. Appropriate time-out protocol was followed: the correct patient, the correct procedure, and the correct equipment in the room were confirmed. Throughout the procedure, the patient’s blood pressure, pulse, and oxygen saturations were monitored continuously. The Olymus flexible sideviewing duodenoscope was inserted through the oropharynx, esophagus intubated, then advanced to the gastrointestinal tract to the major papilla. The duct(s) were cannulated and contrast was injected I personally interpreted the ductal images.  Findings and interventions were performed and documented below. Air was then withdrawn  and the duodenoscope was removed. The patient tolerated the procedure well. There were no immediate postoperative complications    Findings:     film demonstrated a right upper quadrant cholecystectomy clips as well as in situ biliary stent.  Scope inserted to the second portion duodenum without difficulty.  Bile was noted extruding from the biliary stent.  Stent was removed with a cold snare intact.  Cannulation with a 0.025 wire sphincterotome was pure.  Throughout entire procedure pancreatic duct was never cannulated or injected.  A test cholangiogram demonstrated appropriate anatomy.  Utilizing a 9-12 mm extractor balloon multiple balloon sweeps were performed without any significant debris.  Subsequently a stepwise occlusion cholangiogram demonstrated no filling defect mildly dilated common bile duct with gradual tapering towards the ampulla without an obvious mass.  Bile was noted draining from the papilla.  Upon completion of ERCP a biopsy was taken of the papilla to ensure no abnormalities.  Likely is due to stent irritation.  Bilateral films under diaphragm was negative for free air    Impressions:   1.  ERCP with bile duct stent removal.  2.  Gradual taper of the bile duct towards the ampulla  3.  Papillary irritation likely secondary to stent biopsies taken    Recommendations:   1.  Monitor for postprocedure complication including perforation bleeding pancreatitis  2.  Indomethacin suppository 100 mg  3. Follow-up with primary GI  4. Trend LFT  5. Await biopsy        NOTE: Radiologic interpretation of dynamic and static fluoroscopic imaging by myself.  At no time was/were a Radiologist present.     This note was generated using voice recognition software which has a small chance of producing errors of grammar and possibly content. I have made every reasonable attempt to find and correct any obvious errors, but expect that some may not be found prior to finalization of this note

## 2022-01-14 NOTE — DISCHARGE INSTRUCTIONS
ENDOSCOPY HOME CARE INSTRUCTIONS    GASTROSCOPY OR ERCP  1. Don't eat or drink anything for about an hour after the test. You can then resume your regular diet.  2. Don't drive or drink alcohol for 24 hours. The medication you received will make you too drowsy.  3. Don't take any coffee, tea, or aspirin products until after you see your doctor. These can harm the lining of your stomach.  4. If you begin to vomit bloody material, or develop black or bloody stools, call your doctor as soon as possible.  5. If you have any neck, chest, abdominal pain or temp of 100 degrees, call your doctor.  6. See your doctor as instructed.    You should call 911 if you develop problems with breathing or chest pain.  If any questions arise, call your doctor. If your doctor is not available, please feel free to call [phone]. You can also call the [employer] HOTLINE open 24 hours/day, 7 days/week and speak to a nurse at [phone], or toll free [phone].    Impressions:   1.  ERCP with bile duct stent removal.  2.  Gradual taper of the bile duct towards the ampulla  3.  Papillary irritation likely secondary to stent biopsies taken     Recommendations:     1. Follow-up with primary GI  2. Trend LFT  3. Await biopsy  Depression / Suicide Risk    As [facility] are [facility], it is important to learn how to keep safe from harming yourself.    Recognize the warning signs:  · Abrupt changes in personality, positive or negative- including increase in energy   · Giving away possessions  · Change in eating patterns- significant weight changes-  positive or negative  · Change in sleeping patterns- unable to sleep or sleeping all the time   · Unwillingness or inability to communicate  · Depression  · Unusual sadness, discouragement and loneliness  · Talk of wanting to die  · Neglect of personal appearance   · Rebelliousness- reckless behavior  · Withdrawal from people/activities they love  · Confusion-  inability to concentrate     If you or a loved one observes any of these behaviors or has concerns about self-harm, here's what you can do:  · Talk about it- your feelings and reasons for harming yourself  · Remove any means that you might use to hurt yourself (examples: pills, rope, extension cords, firearm)  · Get professional help from the community (Mental Health, Substance Abuse, psychological counseling)  · Do not be alone:Call your Safe Contact- someone whom you trust who will be there for you.  · Call your local CRISIS HOTLINE 018-1473 or 059-866-1661  · Call your local Children's Mobile Crisis Response Team [place] (461) 477-9835 or www.[employer]  · Call the toll free National Suicide Prevention Hotlines   · National Suicide Prevention Lifeline 282-619-HZCW (6362)  · National Hope Line Network 800-SUICIDE (608-3693)    I acknowledge receipt and understanding of these Home Care Instructions.

## 2022-01-14 NOTE — ANESTHESIA TIME REPORT
Anesthesia Start and Stop Event Times     Date Time Event    1/14/2022 1021 Ready for Procedure     1021 Anesthesia Start     1054 Anesthesia Stop        Responsible Staff  01/14/22    Name Role Begin End    Morgan Temple M.D. Anesth 1021 1054        Preop Diagnosis (Free Text):  Pre-op Diagnosis     LIVER FAILURE        Preop Diagnosis (Codes):    Premium Reason  Non-Premium    Comments:

## 2022-01-14 NOTE — OR NURSING
1135 Arrived to stage II. Pt dressed with CNA assist. Steady gait. Denies any lightheadedness.    1145 Pt up to bathroom to void. Pt ambulated to BR with RN assist. Voided 300ml clear, yellow u/o, no clots noted.     1215 Pt met criteria for discharge. DC instructions given to pt and daughter, both s.u. and agreement with poc. Pt states good pain control. Denies any n/v. Transfer safely to vehicle via wheelchair with RN assist.

## 2022-01-14 NOTE — PROGRESS NOTES
Indication:Elevated LFT, history of ERCP with stent.   Risks, benefits, and alternatives were discussed with consenting person(s). Consenting person(s) were given an opportunity to ask questions and discuss other options. Risks including but not limited to failed or incomplete ERCP stent removal/exchange, ineffective therapy, pancreatitis (with potential future complications), perforation, infection, bleeding, missed lesion(s), missed diagnosis, cardiac and/or pulmonary event, aspiration, stroke, possible need for surgery, hospitalization possibly prolonged, discomfort, unsuccessful and/or incomplete procedure, possible need for repeat procedures and/or additional testings, damage to adjacent organs and/or vascular structures, medication reaction, disability, death, and other adverse events possibly life-threatening. Discussion was undertaken with Layman's terms. Consenting persons stated understanding and acceptance of these risks, and wished to proceed. Consent was given in clear state of mind.

## 2022-01-14 NOTE — OR NURSING
1049:  To PACU fromENDO via bed.  Spontaneous breathing pattern.  VSS.      1100: Pt drowsy, follows verbal commands.  Nods head yes that she is feeling well.      1115: Drinking water.  Eyeglasses in place, FUD in place. Daughter called and given update.      1130: Pt more awake. Answers questions appropriately, clear speech pattern.  Denies pain, nausea.  Respirations spontaneous and non-labored. Meets criteria to transfer to Stage 2. Report called to Stage 2 RN. Pt has no complaints.     1135: Transferred to stage 2.

## 2022-01-14 NOTE — ANESTHESIA POSTPROCEDURE EVALUATION
Patient: Anu Manuel    Procedure Summary     Date: 01/14/22 Room / Location:  ENDOSCOPIC ULTRASOUND ROOM / SURGERY [facility]    Anesthesia Start: 1021 Anesthesia Stop: 1054    Procedure: ERCP, DIAGNOSTIC - WITH SIGMOID COLON BIOPSY AND STENT REMOVAL (N/A Abdomen) Diagnosis: (liver failure, pending pathology)    Surgeons: Cr Haro M.D. Responsible Provider: Morgan Temple M.D.    Anesthesia Type: general ASA Status: 3          Final Anesthesia Type: general  Last vitals  BP   Blood Pressure: 129/73    Temp   36.7 °C (98.1 °F)    Pulse   79   Resp   16    SpO2   93 %      Anesthesia Post Evaluation    Patient location during evaluation: PACU  Patient participation: complete - patient participated  Level of consciousness: awake and alert  Pain score: 0    Airway patency: patent  Anesthetic complications: no  Cardiovascular status: hemodynamically stable  Respiratory status: acceptable  Hydration status: euvolemic    PONV: none          No complications documented.     Nurse Pain Score: 0 (NPRS)

## 2022-01-14 NOTE — ANESTHESIA PROCEDURE NOTES
Airway    Date/Time: 1/14/2022 10:26 AM  Performed by: Morgan Temple M.D.  Authorized by: Morgan Temple M.D.     Location:  OR  Urgency:  Elective  Indications for Airway Management:  Anesthesia      Spontaneous Ventilation: absent    Sedation Level:  Deep  Preoxygenated: Yes    Patient Position:  Sniffing  Mask Difficulty Assessment:  0 - not attempted  Final Airway Type:  Endotracheal airway  Final Endotracheal Airway:  ETT  Cuffed: Yes    Technique Used for Successful ETT Placement:  Direct laryngoscopy    Insertion Site:  Oral  Blade Type:  Howard  Laryngoscope Blade/Videolaryngoscope Blade Size:  2  ETT Size (mm):  7.0  Measured from:  Teeth  ETT to Teeth (cm):  21  Placement Verified by: auscultation and capnometry    Cormack-Lehane Classification:  Grade I - full view of glottis  Number of Attempts at Approach:  1  Ventilation Between Attempts:  None  Number of Other Approaches Attempted:  0

## 2022-01-24 ENCOUNTER — PATIENT MESSAGE (OUTPATIENT)
Dept: HEALTH INFORMATION MANAGEMENT | Facility: OTHER | Age: 67
End: 2022-01-24
Payer: MEDICARE

## 2022-02-08 ENCOUNTER — TELEPHONE (OUTPATIENT)
Dept: NEPHROLOGY | Facility: MEDICAL CENTER | Age: 67
End: 2022-02-08

## 2022-02-08 DIAGNOSIS — N18.31 STAGE 3A CHRONIC KIDNEY DISEASE: ICD-10-CM

## 2022-02-08 NOTE — TELEPHONE ENCOUNTER
Hello Dr.Najjar,  Patient is scheduled to see you in May, however has completed all your lab orders but her micro albumin order.   Would you like to order any additional labs for her to complete prior to visit?

## 2022-02-16 ENCOUNTER — HOSPITAL ENCOUNTER (OUTPATIENT)
Dept: LAB | Facility: MEDICAL CENTER | Age: 67
End: 2022-02-16
Attending: INTERNAL MEDICINE
Payer: MEDICARE

## 2022-02-16 ENCOUNTER — HOSPITAL ENCOUNTER (OUTPATIENT)
Dept: LAB | Facility: MEDICAL CENTER | Age: 67
End: 2022-02-16
Attending: NURSE PRACTITIONER
Payer: MEDICARE

## 2022-02-16 LAB
25(OH)D3 SERPL-MCNC: 76 NG/ML (ref 30–100)
ALBUMIN SERPL BCP-MCNC: 4.3 G/DL (ref 3.2–4.9)
ALBUMIN/GLOB SERPL: 1.4 G/DL
ALP SERPL-CCNC: 622 U/L (ref 30–99)
ALT SERPL-CCNC: 105 U/L (ref 2–50)
AMORPH CRY #/AREA URNS HPF: PRESENT /HPF
ANION GAP SERPL CALC-SCNC: 14 MMOL/L (ref 7–16)
APPEARANCE UR: CLEAR
AST SERPL-CCNC: 90 U/L (ref 12–45)
BACTERIA #/AREA URNS HPF: ABNORMAL /HPF
BASOPHILS # BLD AUTO: 1.7 % (ref 0–1.8)
BASOPHILS # BLD: 0.1 K/UL (ref 0–0.12)
BILIRUB SERPL-MCNC: 0.9 MG/DL (ref 0.1–1.5)
BILIRUB UR QL STRIP.AUTO: ABNORMAL
BUN SERPL-MCNC: 14 MG/DL (ref 8–22)
CALCIUM SERPL-MCNC: 9.3 MG/DL (ref 8.5–10.5)
CAOX CRY #/AREA URNS HPF: ABNORMAL /HPF
CHLORIDE SERPL-SCNC: 101 MMOL/L (ref 96–112)
CHOLEST SERPL-MCNC: 276 MG/DL (ref 100–199)
CO2 SERPL-SCNC: 26 MMOL/L (ref 20–33)
COLOR UR: ABNORMAL
CREAT SERPL-MCNC: 0.83 MG/DL (ref 0.5–1.4)
CREAT UR-MCNC: 426.22 MG/DL
EOSINOPHIL # BLD AUTO: 0.13 K/UL (ref 0–0.51)
EOSINOPHIL NFR BLD: 2.3 % (ref 0–6.9)
EPI CELLS #/AREA URNS HPF: ABNORMAL /HPF
ERYTHROCYTE [DISTWIDTH] IN BLOOD BY AUTOMATED COUNT: 56.2 FL (ref 35.9–50)
EST. AVERAGE GLUCOSE BLD GHB EST-MCNC: 183 MG/DL
FASTING STATUS PATIENT QL REPORTED: NORMAL
FERRITIN SERPL-MCNC: 113 NG/ML (ref 10–291)
FOLATE SERPL-MCNC: 12.4 NG/ML
GLOBULIN SER CALC-MCNC: 3.1 G/DL (ref 1.9–3.5)
GLUCOSE SERPL-MCNC: 67 MG/DL (ref 65–99)
GLUCOSE UR STRIP.AUTO-MCNC: 100 MG/DL
HBA1C MFR BLD: 8 % (ref 4–5.6)
HCT VFR BLD AUTO: 38.2 % (ref 37–47)
HDLC SERPL-MCNC: 130 MG/DL
HGB BLD-MCNC: 12.5 G/DL (ref 12–16)
HYALINE CASTS #/AREA URNS LPF: ABNORMAL /LPF
IMM GRANULOCYTES # BLD AUTO: 0.03 K/UL (ref 0–0.11)
IMM GRANULOCYTES NFR BLD AUTO: 0.5 % (ref 0–0.9)
INR PPP: 0.84 (ref 0.87–1.13)
IRON SATN MFR SERPL: 39 % (ref 15–55)
IRON SERPL-MCNC: 120 UG/DL (ref 40–170)
KETONES UR STRIP.AUTO-MCNC: 15 MG/DL
LDLC SERPL CALC-MCNC: 129 MG/DL
LEUKOCYTE ESTERASE UR QL STRIP.AUTO: NEGATIVE
LIPASE SERPL-CCNC: 10 U/L (ref 11–82)
LYMPHOCYTES # BLD AUTO: 0.97 K/UL (ref 1–4.8)
LYMPHOCYTES NFR BLD: 16.8 % (ref 22–41)
MAGNESIUM SERPL-MCNC: 2.5 MG/DL (ref 1.5–2.5)
MCH RBC QN AUTO: 31.9 PG (ref 27–33)
MCHC RBC AUTO-ENTMCNC: 32.7 G/DL (ref 33.6–35)
MCV RBC AUTO: 97.4 FL (ref 81.4–97.8)
MICRO URNS: ABNORMAL
MICROALBUMIN UR-MCNC: 330.2 MG/DL
MICROALBUMIN/CREAT UR: 775 MG/G (ref 0–30)
MONOCYTES # BLD AUTO: 0.59 K/UL (ref 0–0.85)
MONOCYTES NFR BLD AUTO: 10.2 % (ref 0–13.4)
NEUTROPHILS # BLD AUTO: 3.94 K/UL (ref 2–7.15)
NEUTROPHILS NFR BLD: 68.5 % (ref 44–72)
NITRITE UR QL STRIP.AUTO: POSITIVE
NRBC # BLD AUTO: 0 K/UL
NRBC BLD-RTO: 0 /100 WBC
PH UR STRIP.AUTO: 6.5 [PH] (ref 5–8)
PHOSPHATE SERPL-MCNC: 2.7 MG/DL (ref 2.5–4.5)
PLATELET # BLD AUTO: 380 K/UL (ref 164–446)
PMV BLD AUTO: 10.6 FL (ref 9–12.9)
POTASSIUM SERPL-SCNC: 3.8 MMOL/L (ref 3.6–5.5)
PROT SERPL-MCNC: 7.4 G/DL (ref 6–8.2)
PROT UR QL STRIP: >=300 MG/DL
PROTHROMBIN TIME: 11.2 SEC (ref 12–14.6)
PTH-INTACT SERPL-MCNC: 11.8 PG/ML (ref 14–72)
RBC # BLD AUTO: 3.92 M/UL (ref 4.2–5.4)
RBC # URNS HPF: ABNORMAL /HPF
RBC UR QL AUTO: NEGATIVE
SODIUM SERPL-SCNC: 141 MMOL/L (ref 135–145)
SP GR UR STRIP.AUTO: >=1.03
T3FREE SERPL-MCNC: 1.26 PG/ML (ref 2–4.4)
TIBC SERPL-MCNC: 310 UG/DL (ref 250–450)
TRANSFERRIN SERPL-MCNC: 266 MG/DL (ref 200–370)
TRIGL SERPL-MCNC: 83 MG/DL (ref 0–149)
TSH SERPL DL<=0.005 MIU/L-ACNC: 50 UIU/ML (ref 0.38–5.33)
UIBC SERPL-MCNC: 190 UG/DL (ref 110–370)
URATE CRY #/AREA URNS HPF: POSITIVE /HPF
UROBILINOGEN UR STRIP.AUTO-MCNC: 1 MG/DL
VIT B12 SERPL-MCNC: >4000 PG/ML (ref 211–911)
WBC # BLD AUTO: 5.8 K/UL (ref 4.8–10.8)
WBC #/AREA URNS HPF: ABNORMAL /HPF

## 2022-02-16 PROCEDURE — 83036 HEMOGLOBIN GLYCOSYLATED A1C: CPT

## 2022-02-16 PROCEDURE — 84466 ASSAY OF TRANSFERRIN: CPT

## 2022-02-16 PROCEDURE — 83970 ASSAY OF PARATHORMONE: CPT

## 2022-02-16 PROCEDURE — 83550 IRON BINDING TEST: CPT

## 2022-02-16 PROCEDURE — 83735 ASSAY OF MAGNESIUM: CPT

## 2022-02-16 PROCEDURE — 82043 UR ALBUMIN QUANTITATIVE: CPT

## 2022-02-16 PROCEDURE — 83690 ASSAY OF LIPASE: CPT

## 2022-02-16 PROCEDURE — 85610 PROTHROMBIN TIME: CPT

## 2022-02-16 PROCEDURE — 84100 ASSAY OF PHOSPHORUS: CPT

## 2022-02-16 PROCEDURE — 82746 ASSAY OF FOLIC ACID SERUM: CPT

## 2022-02-16 PROCEDURE — 84439 ASSAY OF FREE THYROXINE: CPT

## 2022-02-16 PROCEDURE — 81001 URINALYSIS AUTO W/SCOPE: CPT

## 2022-02-16 PROCEDURE — 83540 ASSAY OF IRON: CPT

## 2022-02-16 PROCEDURE — 80061 LIPID PANEL: CPT

## 2022-02-16 PROCEDURE — 82570 ASSAY OF URINE CREATININE: CPT

## 2022-02-16 PROCEDURE — 82607 VITAMIN B-12: CPT

## 2022-02-16 PROCEDURE — 80053 COMPREHEN METABOLIC PANEL: CPT

## 2022-02-16 PROCEDURE — 85025 COMPLETE CBC W/AUTO DIFF WBC: CPT

## 2022-02-16 PROCEDURE — 84443 ASSAY THYROID STIM HORMONE: CPT

## 2022-02-16 PROCEDURE — 84481 FREE ASSAY (FT-3): CPT

## 2022-02-16 PROCEDURE — 82306 VITAMIN D 25 HYDROXY: CPT

## 2022-02-16 PROCEDURE — 36415 COLL VENOUS BLD VENIPUNCTURE: CPT

## 2022-02-16 PROCEDURE — 82728 ASSAY OF FERRITIN: CPT

## 2022-02-16 PROCEDURE — 84425 ASSAY OF VITAMIN B-1: CPT

## 2022-02-20 LAB — T4 FREE SERPL DIALY-MCNC: 1 NG/DL (ref 1.1–2.4)

## 2022-02-23 ENCOUNTER — APPOINTMENT (OUTPATIENT)
Dept: RADIOLOGY | Facility: MEDICAL CENTER | Age: 67
End: 2022-02-23
Attending: EMERGENCY MEDICINE
Payer: MEDICARE

## 2022-02-23 ENCOUNTER — HOSPITAL ENCOUNTER (EMERGENCY)
Facility: MEDICAL CENTER | Age: 67
End: 2022-02-23
Attending: EMERGENCY MEDICINE
Payer: MEDICARE

## 2022-02-23 VITALS
RESPIRATION RATE: 18 BRPM | OXYGEN SATURATION: 100 % | TEMPERATURE: 97.4 F | BODY MASS INDEX: 19.14 KG/M2 | HEART RATE: 93 BPM | WEIGHT: 118.61 LBS | DIASTOLIC BLOOD PRESSURE: 83 MMHG | SYSTOLIC BLOOD PRESSURE: 138 MMHG

## 2022-02-23 DIAGNOSIS — R11.2 NON-INTRACTABLE VOMITING WITH NAUSEA, UNSPECIFIED VOMITING TYPE: ICD-10-CM

## 2022-02-23 DIAGNOSIS — R10.13 EPIGASTRIC ABDOMINAL PAIN: ICD-10-CM

## 2022-02-23 LAB
ALBUMIN SERPL BCP-MCNC: 4 G/DL (ref 3.2–4.9)
ALBUMIN/GLOB SERPL: 1.1 G/DL
ALP SERPL-CCNC: 913 U/L (ref 30–99)
ALT SERPL-CCNC: 162 U/L (ref 2–50)
ANION GAP SERPL CALC-SCNC: 13 MMOL/L (ref 7–16)
AST SERPL-CCNC: 167 U/L (ref 12–45)
BASOPHILS # BLD AUTO: 1.4 % (ref 0–1.8)
BASOPHILS # BLD: 0.12 K/UL (ref 0–0.12)
BILIRUB SERPL-MCNC: 1.3 MG/DL (ref 0.1–1.5)
BUN SERPL-MCNC: 17 MG/DL (ref 8–22)
CALCIUM SERPL-MCNC: 9.5 MG/DL (ref 8.4–10.2)
CHLORIDE SERPL-SCNC: 100 MMOL/L (ref 96–112)
CO2 SERPL-SCNC: 25 MMOL/L (ref 20–33)
CREAT SERPL-MCNC: 0.93 MG/DL (ref 0.5–1.4)
EKG IMPRESSION: NORMAL
EKG IMPRESSION: NORMAL
EOSINOPHIL # BLD AUTO: 0.19 K/UL (ref 0–0.51)
EOSINOPHIL NFR BLD: 2.2 % (ref 0–6.9)
ERYTHROCYTE [DISTWIDTH] IN BLOOD BY AUTOMATED COUNT: 54.6 FL (ref 35.9–50)
GLOBULIN SER CALC-MCNC: 3.5 G/DL (ref 1.9–3.5)
GLUCOSE SERPL-MCNC: 125 MG/DL (ref 65–99)
HCT VFR BLD AUTO: 40.6 % (ref 37–47)
HGB BLD-MCNC: 13.1 G/DL (ref 12–16)
IMM GRANULOCYTES # BLD AUTO: 0.04 K/UL (ref 0–0.11)
IMM GRANULOCYTES NFR BLD AUTO: 0.5 % (ref 0–0.9)
LIPASE SERPL-CCNC: 8 U/L (ref 7–58)
LYMPHOCYTES # BLD AUTO: 1.53 K/UL (ref 1–4.8)
LYMPHOCYTES NFR BLD: 18 % (ref 22–41)
MCH RBC QN AUTO: 31.3 PG (ref 27–33)
MCHC RBC AUTO-ENTMCNC: 32.3 G/DL (ref 33.6–35)
MCV RBC AUTO: 97.1 FL (ref 81.4–97.8)
MONOCYTES # BLD AUTO: 0.74 K/UL (ref 0–0.85)
MONOCYTES NFR BLD AUTO: 8.7 % (ref 0–13.4)
NEUTROPHILS # BLD AUTO: 5.89 K/UL (ref 2–7.15)
NEUTROPHILS NFR BLD: 69.2 % (ref 44–72)
NRBC # BLD AUTO: 0 K/UL
NRBC BLD-RTO: 0 /100 WBC
PLATELET # BLD AUTO: 478 K/UL (ref 164–446)
PMV BLD AUTO: 9.5 FL (ref 9–12.9)
POTASSIUM SERPL-SCNC: 3.6 MMOL/L (ref 3.6–5.5)
PROT SERPL-MCNC: 7.5 G/DL (ref 6–8.2)
RBC # BLD AUTO: 4.18 M/UL (ref 4.2–5.4)
SODIUM SERPL-SCNC: 138 MMOL/L (ref 135–145)
TROPONIN T SERPL-MCNC: 24 NG/L (ref 6–19)
TROPONIN T SERPL-MCNC: 29 NG/L (ref 6–19)
VIT B1 BLD-MCNC: 93 NMOL/L (ref 70–180)
WBC # BLD AUTO: 8.5 K/UL (ref 4.8–10.8)

## 2022-02-23 PROCEDURE — 93005 ELECTROCARDIOGRAM TRACING: CPT | Mod: 76 | Performed by: EMERGENCY MEDICINE

## 2022-02-23 PROCEDURE — 96375 TX/PRO/DX INJ NEW DRUG ADDON: CPT

## 2022-02-23 PROCEDURE — 83690 ASSAY OF LIPASE: CPT

## 2022-02-23 PROCEDURE — 84484 ASSAY OF TROPONIN QUANT: CPT | Mod: 91

## 2022-02-23 PROCEDURE — 99285 EMERGENCY DEPT VISIT HI MDM: CPT

## 2022-02-23 PROCEDURE — 74177 CT ABD & PELVIS W/CONTRAST: CPT | Mod: MG

## 2022-02-23 PROCEDURE — 85025 COMPLETE CBC W/AUTO DIFF WBC: CPT

## 2022-02-23 PROCEDURE — 96374 THER/PROPH/DIAG INJ IV PUSH: CPT | Mod: XU

## 2022-02-23 PROCEDURE — 700117 HCHG RX CONTRAST REV CODE 255: Performed by: EMERGENCY MEDICINE

## 2022-02-23 PROCEDURE — 80053 COMPREHEN METABOLIC PANEL: CPT

## 2022-02-23 PROCEDURE — 700111 HCHG RX REV CODE 636 W/ 250 OVERRIDE (IP): Performed by: EMERGENCY MEDICINE

## 2022-02-23 RX ORDER — ONDANSETRON 2 MG/ML
4 INJECTION INTRAMUSCULAR; INTRAVENOUS ONCE
Status: COMPLETED | OUTPATIENT
Start: 2022-02-23 | End: 2022-02-23

## 2022-02-23 RX ORDER — ONDANSETRON 4 MG/1
4 TABLET, ORALLY DISINTEGRATING ORAL EVERY 6 HOURS PRN
Qty: 5 TABLET | Refills: 0 | Status: SHIPPED | OUTPATIENT
Start: 2022-02-23 | End: 2022-07-10

## 2022-02-23 RX ADMIN — FENTANYL CITRATE 50 MCG: 50 INJECTION, SOLUTION INTRAMUSCULAR; INTRAVENOUS at 01:30

## 2022-02-23 RX ADMIN — IOHEXOL 100 ML: 350 INJECTION, SOLUTION INTRAVENOUS at 02:02

## 2022-02-23 RX ADMIN — ONDANSETRON 4 MG: 2 INJECTION INTRAMUSCULAR; INTRAVENOUS at 01:30

## 2022-02-23 ASSESSMENT — PAIN DESCRIPTION - PAIN TYPE: TYPE: ACUTE PAIN

## 2022-02-23 ASSESSMENT — FIBROSIS 4 INDEX: FIB4 SCORE: 1.48

## 2022-02-23 NOTE — ED PROVIDER NOTES
"ED Provider Note    CHIEF COMPLAINT  Chief Complaint   Patient presents with   • Abdominal Pain   • N/V        [facility]    Primary care provider: Jarrell Yates M.D.   History obtained from: Patient  History limited by: None     Anu Manuel is a 66 y.o. female who presents to the ED complaining of intermittent epigastric abdominal pain for about 3 weeks but has been progressively worsening recently.  Patient states that she saw her GI doctor, Dr. Salinas, who ordered labs and also a CT scan but the CT has not been performed.  Patient reports that she has had several episodes of nausea and vomiting.  She did take oxycodone today which helped with her pain slightly.  She denies hematemesis.  She also reports that she has been constipated but did have a small bowel movement today.  No recent hematochezia or melena.  She denies dysuria or hematuria.  No fever or chills.  No chest pain/shortness of breath or difficulty breathing/cough.  No new rash noted.  Patient states that she has had issues with her \"stomach\" and \"liver.\"  Patient is concerned regarding perhaps obstruction due to her nausea and vomiting and constipation.    REVIEW OF SYSTEMS  Please see HPI for pertinent positives/negatives.  All other systems reviewed and are negative.     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 Pt states no problems currently 2018.    • Anesthesia     in 2008 \"throat closes up\"\"anxiety\" ?laryngospasm, kept in " "ICU. Pt states no problems currently 2018.    • Arthritis     right shoulder, hands   • Cigarette smoker quit 2013   • Dental disorder     missing teeth    • Heart burn    • High cholesterol    • Hypertension    • Indigestion    • Infectious disease      had hepatitis C, tested neg.   • Joint replacement     cervical   • Liver failure (HCC)    • Pain     \"fibromyalgia\";lower back, right leg   • Status post appendectomy         SURGICAL HISTORY  Past Surgical History:   Procedure Laterality Date   • WY ERCP,DIAGNOSTIC N/A 1/14/2022    Procedure: ERCP, DIAGNOSTIC - WITH SIGMOID COLON BIOPSY AND STENT REMOVAL;  Surgeon: Cr Haro M.D.;  Location: [facility];  Service: Gastroenterology   • THADDEUS BY LAPAROSCOPY N/A 10/28/2021    Procedure: CHOLECYSTECTOMY, LAPAROSCOPIC;  Surgeon: Tello Trammell M.D.;  Location: [facility];  Service: General   • WY ERCP,DIAGNOSTIC N/A 10/26/2021    Procedure: ERCP (ENDOSCOPIC RETROGRADE CHOLANGIOPANCREATOGRAPHY);  Surgeon: Cr Haro M.D.;  Location: SURGERY [facility];  Service: Gastroenterology   • WY UPPER GI ENDOSCOPY,BIOPSY N/A 10/26/2021    Procedure: GASTROSCOPY, WITH BIOPSY;  Surgeon: Cr Haro M.D.;  Location: SURGERY [facility];  Service: Gastroenterology   • WY UPPER GI ENDOSCOPY,DIAGNOSIS N/A 10/26/2021    Procedure: GASTROSCOPY;  Surgeon: Cr Haro M.D.;  Location: SURGERY [facility];  Service: Gastroenterology   • CATH PLACEMENT  1/25/2020    Procedure: INSERTION, CATHETER PERM;  Surgeon: Rola Mendoza M.D.;  Location: [facility];  Service: General   • IRRIGATION & DEBRIDEMENT NEURO  1/19/2020    Procedure: IRRIGATION AND DEBRIDEMENT, WOUND THORACIC AND LUMBAR;  Surgeon: Ryan Roman M.D.;  Location: [facility];  Service: Neurosurgery   • PB IMPLANT NEUROSTIM/ N/A 12/16/2019    Procedure: EXPLORATION AT THORACIC 8 - 9, REPLACEMENT OF  NEUROSTIMULATOR LEAD;  Surgeon: Ryan" DIPESH Roman;  Location: SURGERY [facility];  Service: Neurosurgery   • SPINAL CORD STIMULATOR N/A 10/26/2018    Procedure: SPINAL CORD STIMULATOR;  Surgeon: Ryan Roman M.D.;  Location: SURGERY [facility];  Service: Neurosurgery   • THORACIC LAMINECTOMY N/A 10/26/2018    Procedure: THORACIC LAMINECTOMY - FOR;  Surgeon: Ryan Roman M.D.;  Location: SURGERY [facility];  Service: Neurosurgery   • AL NJX AA&/STRD TFRML EPI LUMBAR/SACRAL 1 LEVEL Right 8/31/2016    Procedure: INJ-FORAMEN EPI LUM/SAC SNGL L4-5;  Surgeon: Sukhi Godfrey M.D.;  Location: SURGERY [facility];  Service: Pain Management   • LUMBAR LAMINECTOMY DISKECTOMY Right 5/10/2016    Procedure: LUMBAR L4-5 HEMILAMINECTOMY DISKECTOMY ;  Surgeon: Arnold Keyes M.D.;  Location: [facility];  Service:    • CERVICAL FUSION POSTERIOR  1/16/2009    Performed by TARA CONTRERAS at [facility]   • HARDWARE REMOVAL NEURO  1/16/2009    Performed by TARA CONTRERAS at [facility]   • NECK EXPLORATION  1/16/2009    Performed by TARA CONTRERAS at [facility]   • SHOULDER ARTHROSCOPY W/ ROTATOR CUFF REPAIR  10/9/08    Performed by SHERLY CASTANEDA at [facility]   • SHOULDER DECOMPRESSION ARTHROSCOPIC  6/17/08    Performed by SHERLY CASTANEDA at [facility]   • CLAVICLE DISTAL EXCISION  6/17/08    Performed by SHERLY CASTANEDA at [facility]   • CERVICAL DISK AND FUSION ANTERIOR  03/12/08   • HYSTERECTOMY, VAGINAL  2006   • APPENDECTOMY  2004   • THYROID LOBECTOMY  1973   • TONSILLECTOMY  1963   • ABDOMINAL HYSTERECTOMY TOTAL     • LUMPECTOMY  1976, 2005    Breast    • LUMPECTOMY          SOCIAL HISTORY  Social History     Tobacco Use   • Smoking status: Current Every Day Smoker     Packs/day: 0.50     Years: 30.00     Pack years: 15.00     Types: Cigarettes   • Smokeless tobacco: Never Used   Vaping Use   • Vaping Use: Never used   Substance and Sexual  Activity   • Alcohol use: Not Currently   • Drug use: Yes     Comment: Marijuana   • Sexual activity: Not on file        FAMILY HISTORY  Family History   Problem Relation Age of Onset   • Hypertension Mother    • Cancer Father         CURRENT MEDICATIONS  Home Medications    **Home medications have not yet been reviewed for this encounter**          ALLERGIES  Allergies   Allergen Reactions   • Tape Unspecified     Blisters, paper tape is ok        PHYSICAL EXAM  VITAL SIGNS: /83   Pulse 93   Temp 36.3 °C (97.4 °F) (Temporal)   Resp 18   Wt 53.8 kg (118 lb 9.7 oz)   LMP 04/29/2005 (LMP Unknown)   SpO2 100%   BMI 19.14 kg/m²  @JOSE[[id number]::@     Pulse ox interpretation: 97% I interpret this pulse ox as normal     Constitutional: Well developed, well nourished, alert in no apparent distress, nontoxic appearance    HENT: No external signs of trauma, normocephalic, mask on due to COVID-19 pandemic  Eyes: PERRL, conjunctiva without erythema, no discharge, no icterus    Neck: Soft and supple, trachea midline, no stridor, no tenderness, no LAD, no JVD, good ROM    Cardiovascular: Regular rate and rhythm, no murmurs/rubs/gallops, strong distal pulses and good perfusion    Thorax & Lungs: No respiratory distress, CTAB   Abdomen: Soft, epigastric tenderness to palpation with guarding, nondistended, no rebound, normal BS    Back: No CVAT    Extremities: No cyanosis, no edema, no gross deformity, good ROM, intact distal pulses with brisk cap refill    Skin: Warm, dry, no pallor/cyanosis, no rash noted except for scattered sores and scabs  Lymphatic: No lymphadenopathy noted    Neuro: A/O times 3, no focal deficits noted    Psychiatric: Cooperative, slightly anxious      DIAGNOSTIC STUDIES / PROCEDURES    EKG  12 Lead EKG obtained at 0049 and interpreted by me:   Rate: 86   Rhythm: Sinus rhythm   Ectopy: None  Intervals: Normal   Axis: Normal   QRS: Normal   ST segments: Normal  T Waves: Normal    Clinical  Impression: Sinus rhythm without acute ischemic changes or dysrhythmia  Compared to November 24, 2021 without significant change    EKG  12 Lead EKG obtained at 0316 and interpreted by me:   Rate: 79   Rhythm: Sinus rhythm   Ectopy: None  Intervals: Normal   Axis: Normal   QRS: Normal   ST segments: Normal  T Waves: Normal    Clinical Impression: Sinus rhythm without acute ischemic changes or dysrhythmia       LABS  All labs reviewed by me.     Results for orders placed or performed during the hospital encounter of 02/23/22   CBC WITH DIFFERENTIAL   Result Value Ref Range    WBC 8.5 4.8 - 10.8 K/uL    RBC 4.18 (L) 4.20 - 5.40 M/uL    Hemoglobin 13.1 12.0 - 16.0 g/dL    Hematocrit 40.6 37.0 - 47.0 %    MCV 97.1 81.4 - 97.8 fL    MCH 31.3 27.0 - 33.0 pg    MCHC 32.3 (L) 33.6 - 35.0 g/dL    RDW 54.6 (H) 35.9 - 50.0 fL    Platelet Count 478 (H) 164 - 446 K/uL    MPV 9.5 9.0 - 12.9 fL    Neutrophils-Polys 69.20 44.00 - 72.00 %    Lymphocytes 18.00 (L) 22.00 - 41.00 %    Monocytes 8.70 0.00 - 13.40 %    Eosinophils 2.20 0.00 - 6.90 %    Basophils 1.40 0.00 - 1.80 %    Immature Granulocytes 0.50 0.00 - 0.90 %    Nucleated RBC 0.00 /100 WBC    Neutrophils (Absolute) 5.89 2.00 - 7.15 K/uL    Lymphs (Absolute) 1.53 1.00 - 4.80 K/uL    Monos (Absolute) 0.74 0.00 - 0.85 K/uL    Eos (Absolute) 0.19 0.00 - 0.51 K/uL    Baso (Absolute) 0.12 0.00 - 0.12 K/uL    Immature Granulocytes (abs) 0.04 0.00 - 0.11 K/uL    NRBC (Absolute) 0.00 K/uL   COMP METABOLIC PANEL   Result Value Ref Range    Sodium 138 135 - 145 mmol/L    Potassium 3.6 3.6 - 5.5 mmol/L    Chloride 100 96 - 112 mmol/L    Co2 25 20 - 33 mmol/L    Anion Gap 13.0 7.0 - 16.0    Glucose 125 (H) 65 - 99 mg/dL    Bun 17 8 - 22 mg/dL    Creatinine 0.93 0.50 - 1.40 mg/dL    Calcium 9.5 8.4 - 10.2 mg/dL    AST(SGOT) 167 (H) 12 - 45 U/L    ALT(SGPT) 162 (H) 2 - 50 U/L    Alkaline Phosphatase 913 (H) 30 - 99 U/L    Total Bilirubin 1.3 0.1 - 1.5 mg/dL    Albumin 4.0 3.2 - 4.9 g/dL     Total Protein 7.5 6.0 - 8.2 g/dL    Globulin 3.5 1.9 - 3.5 g/dL    A-G Ratio 1.1 g/dL   LIPASE   Result Value Ref Range    Lipase 8 7 - 58 U/L   TROPONIN   Result Value Ref Range    Troponin T 29 (H) 6 - 19 ng/L   ESTIMATED GFR   Result Value Ref Range    GFR If African American >60 >60 mL/min/1.73 m 2    GFR If Non African American >60 >60 mL/min/1.73 m 2   TROPONIN   Result Value Ref Range    Troponin T 24 (H) 6 - 19 ng/L   EKG (NOW)   Result Value Ref Range    Report       [facility] Emergency Dept.    Test Date:  2022  Pt Name:    KALPANA BENJAMÍN               Department: EDS  MRN:        [id number]                      Room:  Gender:     Female                       Technician: BOO  :        1955                   Requested By:SMAANTA ROBLES  Order #:    [id number]                    Reading MD:    Measurements  Intervals                                Axis  Rate:       86                           P:          73  UT:         152                          QRS:        58  QRSD:       84                           T:          49  QT:         340  QTc:        407    Interpretive Statements  SINUS RHYTHM  BASELINE WANDER IN LEAD(S) II,III,aVF  Compared to ECG 2021 11:42:18  No significant changes     EKG (NOW)   Result Value Ref Range    Report       [facility] Emergency Dept.    Test Date:  2022  Pt Name:    KALPANA BUTTS               Department: EDS  MRN:        [id number]                      [facility]  Gender:     [facility]                       Technician: bipin  :        1955                   Requested By:SAMANTA ROBLES  Order #:    [id number]                    Reading MD:    Measurements  Intervals                                Axis  Rate:       79                           P:          -12  UT:         148                          QRS:        1  QRSD:       76                           T:          6  QT:         376  QTc:         432    Interpretive Statements  SINUS RHYTHM  Compared to ECG 02/23/2022 00:49:41  No significant changes          RADIOLOGY  The radiologist's interpretation of all radiological studies have been reviewed by me.     CT-ABDOMEN-PELVIS WITH   Final Result         1.  Intrahepatic and extra hepatic biliary ductal dilatation, commonly associated with postcholecystectomy physiology, consider causes of biliary obstruction with additional workup as clinically appropriate.   2.  Atherosclerosis and atherosclerotic coronary artery disease   3.  Pulmonary nodule, see nodule follow-up recommendations below.      Fleischner Society pulmonary nodule recommendations:      Low Risk: CT at 6-12 months, then consider CT at 18-24 months      High Risk: CT at 6-12 months, then CT at 18-24 months      Low Risk - Minimal or absent history of smoking and of other known risk factors.      High Risk - History of smoking or of other known risk factors.      Note: These recommendations do not apply to lung cancer screening, patients with immunosuppression, or patients with known primary cancer.      Fleischner Society 2017 Guidelines for Management of Incidentally Detected Pulmonary Nodules in Adults                COURSE & MEDICAL DECISION MAKING  Nursing notes, VS, PMSFHx reviewed in chart.     Review of past medical records shows the patient was last admitted to this hospital January 14, 2022 for ERCP and sigmoid colon biopsy and stent removal.      Differential diagnoses considered include but are not limited to: AMI, AAA, bowel perforation/obstruction, ileus, mesenteric ischemia, pancreatitis, PUD, gastritis, GERD, colitis, constipation, muscle strain, hernia      History and physical exam as above.  Due to her abdominal pain along with nausea and vomiting and patient request for CT scan despite multiple past CT scans, CT was performed today with findings as above.  Her transaminase levels and alk phos are slightly more increased  compared to recent results.  Mildly elevated troponin appears to be chronic compared to her prior results and without significant increase on delta troponin.  Labs are otherwise fairly unremarkable.  EKG x2 without evidence for acute ischemic changes or dysrhythmia.  Patient received Zofran and fentanyl in the ED and subsequently tolerated oral fluids without difficulty.  I discussed the findings with the patient and she reports feeling better.  She is noted to be in no acute distress and nontoxic in appearance without evidence for acute abdomen on recheck to suggest a surgical emergency.  No indications for admission or emergent procedures at this time.  Patient will follow-up with her GI.  She will be prescribed Zofran to use as needed.  Return to ED precautions were given.  Patient also noted to have elevated blood pressure readings for which she can follow-up on outpatient basis for further management.  Patient verbalized understanding and agreed with plan of care with no further questions or concerns.      The patient is referred to a primary physician for blood pressure management, diabetic screening, and for all other preventative health concerns.       FINAL IMPRESSION  1. Epigastric abdominal pain Acute   2. Non-intractable vomiting with nausea, unspecified vomiting type Acute          DISPOSITION  Patient will be discharged home in stable condition.       FOLLOW UP  Hayden Zavala M.D.  [address] [id number] [phone]    Call today      Jarrell Yates M.D.  [address]  [address]  [place] [facility] [address]  [phone]    Call today      [facility], Emergency Dept  [address]  [facility] [id number] [phone]    If symptoms worsen         OUTPATIENT MEDICATIONS  Discharge Medication List as of 2/23/2022  4:05 AM      START taking these medications    Details   !! ondansetron (ZOFRAN ODT) 4 MG TABLET DISPERSIBLE Take 1 Tablet by mouth every 6 hours as  needed., Disp-5 Tablet, R-0, Print Rx Paper       !! - Potential duplicate medications found. Please discuss with provider.             Electronically signed by: Armani Santillan D.O., 2/23/2022 12:46 AM      Portions of this record were made with voice recognition software.  Despite my review, spelling/grammar/context errors may still remain.  Interpretation of this chart should be taken in this context.

## 2022-03-01 ENCOUNTER — APPOINTMENT (OUTPATIENT)
Dept: RADIOLOGY | Facility: MEDICAL CENTER | Age: 67
End: 2022-03-01
Attending: INTERNAL MEDICINE
Payer: MEDICARE

## 2022-04-08 ENCOUNTER — HOSPITAL ENCOUNTER (OUTPATIENT)
Dept: LAB | Facility: MEDICAL CENTER | Age: 67
End: 2022-04-08
Attending: INTERNAL MEDICINE
Payer: MEDICARE

## 2022-04-08 LAB
ALBUMIN SERPL BCP-MCNC: 3.2 G/DL (ref 3.2–4.9)
ALBUMIN/GLOB SERPL: 0.9 G/DL
ALP SERPL-CCNC: 1303 U/L (ref 30–99)
ALT SERPL-CCNC: 74 U/L (ref 2–50)
ANION GAP SERPL CALC-SCNC: 13 MMOL/L (ref 7–16)
AST SERPL-CCNC: 74 U/L (ref 12–45)
BASOPHILS # BLD AUTO: 1.6 % (ref 0–1.8)
BASOPHILS # BLD: 0.11 K/UL (ref 0–0.12)
BILIRUB SERPL-MCNC: 1.8 MG/DL (ref 0.1–1.5)
BUN SERPL-MCNC: 14 MG/DL (ref 8–22)
CALCIUM SERPL-MCNC: 8.6 MG/DL (ref 8.5–10.5)
CHLORIDE SERPL-SCNC: 103 MMOL/L (ref 96–112)
CO2 SERPL-SCNC: 23 MMOL/L (ref 20–33)
CREAT SERPL-MCNC: 0.89 MG/DL (ref 0.5–1.4)
EOSINOPHIL # BLD AUTO: 0.08 K/UL (ref 0–0.51)
EOSINOPHIL NFR BLD: 1.2 % (ref 0–6.9)
ERYTHROCYTE [DISTWIDTH] IN BLOOD BY AUTOMATED COUNT: 55.1 FL (ref 35.9–50)
GFR SERPLBLD CREATININE-BSD FMLA CKD-EPI: 72 ML/MIN/1.73 M 2
GLOBULIN SER CALC-MCNC: 3.4 G/DL (ref 1.9–3.5)
GLUCOSE SERPL-MCNC: 324 MG/DL (ref 65–99)
HCT VFR BLD AUTO: 36 % (ref 37–47)
HGB BLD-MCNC: 11.5 G/DL (ref 12–16)
IMM GRANULOCYTES # BLD AUTO: 0.03 K/UL (ref 0–0.11)
IMM GRANULOCYTES NFR BLD AUTO: 0.4 % (ref 0–0.9)
INR PPP: 0.91 (ref 0.87–1.13)
LYMPHOCYTES # BLD AUTO: 0.82 K/UL (ref 1–4.8)
LYMPHOCYTES NFR BLD: 11.8 % (ref 22–41)
MCH RBC QN AUTO: 32.2 PG (ref 27–33)
MCHC RBC AUTO-ENTMCNC: 31.9 G/DL (ref 33.6–35)
MCV RBC AUTO: 100.8 FL (ref 81.4–97.8)
MONOCYTES # BLD AUTO: 0.66 K/UL (ref 0–0.85)
MONOCYTES NFR BLD AUTO: 9.5 % (ref 0–13.4)
NEUTROPHILS # BLD AUTO: 5.25 K/UL (ref 2–7.15)
NEUTROPHILS NFR BLD: 75.5 % (ref 44–72)
NRBC # BLD AUTO: 0 K/UL
NRBC BLD-RTO: 0 /100 WBC
PLATELET # BLD AUTO: 477 K/UL (ref 164–446)
PMV BLD AUTO: 11.6 FL (ref 9–12.9)
POTASSIUM SERPL-SCNC: 4.2 MMOL/L (ref 3.6–5.5)
PROT SERPL-MCNC: 6.6 G/DL (ref 6–8.2)
PROTHROMBIN TIME: 12 SEC (ref 12–14.6)
RBC # BLD AUTO: 3.57 M/UL (ref 4.2–5.4)
SODIUM SERPL-SCNC: 139 MMOL/L (ref 135–145)
WBC # BLD AUTO: 7 K/UL (ref 4.8–10.8)

## 2022-04-08 PROCEDURE — 85610 PROTHROMBIN TIME: CPT

## 2022-04-08 PROCEDURE — 36415 COLL VENOUS BLD VENIPUNCTURE: CPT

## 2022-04-08 PROCEDURE — 80053 COMPREHEN METABOLIC PANEL: CPT

## 2022-04-08 PROCEDURE — 85025 COMPLETE CBC W/AUTO DIFF WBC: CPT

## 2022-05-02 ENCOUNTER — HOSPITAL ENCOUNTER (OUTPATIENT)
Dept: RADIOLOGY | Facility: MEDICAL CENTER | Age: 67
End: 2022-05-02
Attending: NURSE PRACTITIONER
Payer: MEDICARE

## 2022-05-02 DIAGNOSIS — M81.0 SENILE OSTEOPOROSIS: ICD-10-CM

## 2022-05-02 PROCEDURE — 77080 DXA BONE DENSITY AXIAL: CPT

## 2022-05-05 ENCOUNTER — HOSPITAL ENCOUNTER (EMERGENCY)
Facility: MEDICAL CENTER | Age: 67
End: 2022-05-05
Attending: EMERGENCY MEDICINE
Payer: MEDICARE

## 2022-05-05 ENCOUNTER — APPOINTMENT (OUTPATIENT)
Dept: RADIOLOGY | Facility: MEDICAL CENTER | Age: 67
End: 2022-05-05
Attending: EMERGENCY MEDICINE
Payer: MEDICARE

## 2022-05-05 VITALS
OXYGEN SATURATION: 99 % | DIASTOLIC BLOOD PRESSURE: 89 MMHG | RESPIRATION RATE: 16 BRPM | HEIGHT: 66 IN | SYSTOLIC BLOOD PRESSURE: 154 MMHG | HEART RATE: 77 BPM | BODY MASS INDEX: 19.49 KG/M2 | WEIGHT: 121.25 LBS | TEMPERATURE: 98.4 F

## 2022-05-05 DIAGNOSIS — R11.2 NON-INTRACTABLE VOMITING WITH NAUSEA, UNSPECIFIED VOMITING TYPE: ICD-10-CM

## 2022-05-05 LAB
ALBUMIN SERPL BCP-MCNC: 2.9 G/DL (ref 3.2–4.9)
ALBUMIN/GLOB SERPL: 0.8 G/DL
ALP SERPL-CCNC: 1009 U/L (ref 30–99)
ALT SERPL-CCNC: 51 U/L (ref 2–50)
ANION GAP SERPL CALC-SCNC: 9 MMOL/L (ref 7–16)
APPEARANCE UR: CLEAR
AST SERPL-CCNC: 57 U/L (ref 12–45)
BACTERIA #/AREA URNS HPF: ABNORMAL /HPF
BASOPHILS # BLD AUTO: 1.4 % (ref 0–1.8)
BASOPHILS # BLD: 0.11 K/UL (ref 0–0.12)
BILIRUB SERPL-MCNC: 0.8 MG/DL (ref 0.1–1.5)
BILIRUB UR QL STRIP.AUTO: NEGATIVE
BUN SERPL-MCNC: 18 MG/DL (ref 8–22)
CALCIUM SERPL-MCNC: 8.3 MG/DL (ref 8.4–10.2)
CHLORIDE SERPL-SCNC: 98 MMOL/L (ref 96–112)
CO2 SERPL-SCNC: 22 MMOL/L (ref 20–33)
COLOR UR: ABNORMAL
CREAT SERPL-MCNC: 0.88 MG/DL (ref 0.5–1.4)
EKG IMPRESSION: NORMAL
EOSINOPHIL # BLD AUTO: 0.09 K/UL (ref 0–0.51)
EOSINOPHIL NFR BLD: 1.2 % (ref 0–6.9)
EPI CELLS #/AREA URNS HPF: ABNORMAL /HPF
ERYTHROCYTE [DISTWIDTH] IN BLOOD BY AUTOMATED COUNT: 48.8 FL (ref 35.9–50)
GFR SERPLBLD CREATININE-BSD FMLA CKD-EPI: 73 ML/MIN/1.73 M 2
GLOBULIN SER CALC-MCNC: 3.6 G/DL (ref 1.9–3.5)
GLUCOSE SERPL-MCNC: 405 MG/DL (ref 65–99)
GLUCOSE UR STRIP.AUTO-MCNC: >=1000 MG/DL
HCT VFR BLD AUTO: 35.8 % (ref 37–47)
HGB BLD-MCNC: 11.7 G/DL (ref 12–16)
IMM GRANULOCYTES # BLD AUTO: 0.04 K/UL (ref 0–0.11)
IMM GRANULOCYTES NFR BLD AUTO: 0.5 % (ref 0–0.9)
KETONES UR STRIP.AUTO-MCNC: NEGATIVE MG/DL
LACTATE BLD-SCNC: 1.2 MMOL/L (ref 0.5–2)
LEUKOCYTE ESTERASE UR QL STRIP.AUTO: NEGATIVE
LIPASE SERPL-CCNC: 7 U/L (ref 7–58)
LYMPHOCYTES # BLD AUTO: 1.26 K/UL (ref 1–4.8)
LYMPHOCYTES NFR BLD: 16.5 % (ref 22–41)
MCH RBC QN AUTO: 32.1 PG (ref 27–33)
MCHC RBC AUTO-ENTMCNC: 32.7 G/DL (ref 33.6–35)
MCV RBC AUTO: 98.1 FL (ref 81.4–97.8)
MICRO URNS: ABNORMAL
MONOCYTES # BLD AUTO: 0.75 K/UL (ref 0–0.85)
MONOCYTES NFR BLD AUTO: 9.8 % (ref 0–13.4)
NEUTROPHILS # BLD AUTO: 5.4 K/UL (ref 2–7.15)
NEUTROPHILS NFR BLD: 70.6 % (ref 44–72)
NITRITE UR QL STRIP.AUTO: NEGATIVE
NRBC # BLD AUTO: 0 K/UL
NRBC BLD-RTO: 0 /100 WBC
PH UR STRIP.AUTO: 6.5 [PH] (ref 5–8)
PLATELET # BLD AUTO: 320 K/UL (ref 164–446)
PMV BLD AUTO: 9.9 FL (ref 9–12.9)
POTASSIUM SERPL-SCNC: 5.4 MMOL/L (ref 3.6–5.5)
PROT SERPL-MCNC: 6.5 G/DL (ref 6–8.2)
PROT UR QL STRIP: 30 MG/DL
RBC # BLD AUTO: 3.65 M/UL (ref 4.2–5.4)
RBC # URNS HPF: ABNORMAL /HPF
RBC UR QL AUTO: ABNORMAL
SODIUM SERPL-SCNC: 129 MMOL/L (ref 135–145)
SP GR UR STRIP.AUTO: 1.01
UNIDENT CRYS URNS QL MICRO: ABNORMAL /HPF
WBC # BLD AUTO: 7.7 K/UL (ref 4.8–10.8)
WBC #/AREA URNS HPF: ABNORMAL /HPF

## 2022-05-05 PROCEDURE — 83605 ASSAY OF LACTIC ACID: CPT

## 2022-05-05 PROCEDURE — 700111 HCHG RX REV CODE 636 W/ 250 OVERRIDE (IP): Performed by: EMERGENCY MEDICINE

## 2022-05-05 PROCEDURE — 36415 COLL VENOUS BLD VENIPUNCTURE: CPT

## 2022-05-05 PROCEDURE — 74177 CT ABD & PELVIS W/CONTRAST: CPT | Mod: ME

## 2022-05-05 PROCEDURE — 81001 URINALYSIS AUTO W/SCOPE: CPT

## 2022-05-05 PROCEDURE — 96374 THER/PROPH/DIAG INJ IV PUSH: CPT | Mod: XU

## 2022-05-05 PROCEDURE — 85025 COMPLETE CBC W/AUTO DIFF WBC: CPT

## 2022-05-05 PROCEDURE — 99284 EMERGENCY DEPT VISIT MOD MDM: CPT

## 2022-05-05 PROCEDURE — 96375 TX/PRO/DX INJ NEW DRUG ADDON: CPT

## 2022-05-05 PROCEDURE — 700105 HCHG RX REV CODE 258: Performed by: EMERGENCY MEDICINE

## 2022-05-05 PROCEDURE — 80053 COMPREHEN METABOLIC PANEL: CPT

## 2022-05-05 PROCEDURE — 93005 ELECTROCARDIOGRAM TRACING: CPT | Performed by: EMERGENCY MEDICINE

## 2022-05-05 PROCEDURE — 83690 ASSAY OF LIPASE: CPT

## 2022-05-05 PROCEDURE — 700117 HCHG RX CONTRAST REV CODE 255: Performed by: EMERGENCY MEDICINE

## 2022-05-05 RX ORDER — ONDANSETRON 2 MG/ML
4 INJECTION INTRAMUSCULAR; INTRAVENOUS ONCE
Status: COMPLETED | OUTPATIENT
Start: 2022-05-05 | End: 2022-05-05

## 2022-05-05 RX ORDER — ONDANSETRON 4 MG/1
4 TABLET, ORALLY DISINTEGRATING ORAL EVERY 6 HOURS PRN
Qty: 10 TABLET | Refills: 0 | Status: SHIPPED | OUTPATIENT
Start: 2022-05-05 | End: 2022-07-10

## 2022-05-05 RX ORDER — SODIUM CHLORIDE 9 MG/ML
1000 INJECTION, SOLUTION INTRAVENOUS CONTINUOUS
Status: ACTIVE | OUTPATIENT
Start: 2022-05-05 | End: 2022-05-05

## 2022-05-05 RX ORDER — MORPHINE SULFATE 4 MG/ML
4 INJECTION INTRAVENOUS ONCE
Status: COMPLETED | OUTPATIENT
Start: 2022-05-05 | End: 2022-05-05

## 2022-05-05 RX ADMIN — ONDANSETRON 4 MG: 2 INJECTION INTRAMUSCULAR; INTRAVENOUS at 20:21

## 2022-05-05 RX ADMIN — SODIUM CHLORIDE 1000 ML: 9 INJECTION, SOLUTION INTRAVENOUS at 19:00

## 2022-05-05 RX ADMIN — IOHEXOL 100 ML: 350 INJECTION, SOLUTION INTRAVENOUS at 18:51

## 2022-05-05 RX ADMIN — MORPHINE SULFATE 4 MG: 4 INJECTION INTRAVENOUS at 20:21

## 2022-05-05 RX ADMIN — IOHEXOL 25 ML: 240 INJECTION, SOLUTION INTRATHECAL; INTRAVASCULAR; INTRAVENOUS; ORAL at 18:52

## 2022-05-05 ASSESSMENT — FIBROSIS 4 INDEX: FIB4 SCORE: 1.17

## 2022-05-05 ASSESSMENT — LIFESTYLE VARIABLES
DO YOU DRINK ALCOHOL: YES
HAVE YOU EVER FELT YOU SHOULD CUT DOWN ON YOUR DRINKING: NO

## 2022-05-05 NOTE — ED TRIAGE NOTES
"Chief Complaint   Patient presents with   • Vomiting     Pt walk-in by self. Pt states she went to her GI doctor yesterday & he instructed her to come to ED for further evaluation. Pt states she has been vomiting for approx 3 months but frequency has increased over the past 2 days. Pt states she has an autoimmune liver disease & has intermittent jaundice. Pt states she is on palliative care.     /91   Pulse 75   Temp 36.8 °C (98.3 °F) (Temporal)   Resp 16   Ht 1.676 m (5' 6\")   Wt 55 kg (121 lb 4.1 oz)   SpO2 99% RA    Has this patient been vaccinated for COVID?  NO  If not, would they like to be vaccinated while in the ER if eligible?  -  Would the patient like to speak with the ERP about the possibility of receiving the COVID vaccine today before making a decision?  -   "

## 2022-05-06 NOTE — ED NOTES
Received report from MINI Min, she reported that CT said IV had infiltrated.  Upon assessment IV flushed well and good blood return.  Ksaey in CT notified, when she came in to flush IV, pt c/o of pain.  IV restarted in R AC w/o difficulty.  Kasey in CT made aware of new IV.  CT pending.

## 2022-05-06 NOTE — ED PROVIDER NOTES
ED Provider Note    Scribed for Iliana Gandhi M.D. by Nando Klein. 5/5/2022  5:22 PM    Primary care provider: Jarrell Yates M.D.  Means of arrival: Walk-in  History obtained from: Patient  History limited by: None    CHIEF COMPLAINT  Chief Complaint   Patient presents with    Vomiting     Pt walk-in by self. Pt states she went to her GI doctor yesterday & he instructed her to come to ED for further evaluation. Pt states she has been vomiting for approx 3 months but frequency has increased over the past 2 days. Pt states she has an autoimmune liver disease & has intermittent jaundice. Pt states she is on palliative care.     HPI  Anu Manuel is a 67 y.o. female with a history of autoimmune liver disease, type 1 diabetes, hypertension, and liver failure who presents to the Emergency Department for worsening acute on chronic episodes of vomiting onset 2 days ago. She states that over the last 3 to 4 months, she has been vomiting about 2-3 times a week. In the last few days, she has had about 3 episodes of vomiting a day. She states she was seen by her GI, Dr. Zavala, who instructed her to come to the ED for evaluation. She reports associated runny nose, weight loss, and mild generalized abdominal pain. She states that she has taken antacids at home which did not alleviate her symptoms. She denies associated blood in the vomit. She denies associated fever, diarrhea, cough, or sore throat. She states she has not recently had an endoscopy. She previously had 2 stents placed 1.5 years ago. She has a history of appendectomy and cholecystectomy. She states she smokes about half a pack a day. She denies recent drug use.    REVIEW OF SYSTEMS  HEENT:  No ear pain, or sore throat.   EYES: no discharge, redness, or vision changes  CARDIAC: no chest pain, no palpitations    PULMONARY: no dyspnea, cough. Positive runny nose.  GI: no vomiting, diarrhea. Positive mild generalized abdominal pain   : no dysuria, back  pain, or hematuria   Neuro: no weakness, numbness, aphasia, or headache  Musculoskeletal: no swelling, deformity, pain, or joint swelling  Endocrine: no fevers, sweating. Positive weight loss.  SKIN: no rash, erythema, or contusions   See history of present illness. All other systems are negative. C.    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    SURGICAL HISTORY   has a past surgical history that includes hysterectomy, vaginal (2006); thyroid lobectomy (1973); lumpectomy (1976, 2005); cervical disk and fusion anterior (03/12/08); tonsillectomy (1963); cervical fusion posterior (1/16/2009); hardware removal neuro (1/16/2009); neck exploration (1/16/2009); abdominal hysterectomy total; lumpectomy; lumbar laminectomy diskectomy (Right, 5/10/2016); shoulder decompression arthroscopic (6/17/08); clavicle distal excision (6/17/08); shoulder arthroscopy w/ rotator cuff repair (10/9/08); njx aa&/strd tfrml epi lumbar/sacral 1 level (Right, 8/31/2016); spinal cord stimulator (N/A, 10/26/2018); thoracic laminectomy (N/A, 10/26/2018); appendectomy (2004); implant neurostim/ (N/A, 12/16/2019); irrigation & debridement neuro (1/19/2020); cath placement (1/25/2020); ercp,diagnostic (N/A, 10/26/2021); upper gi endoscopy,biopsy (N/A, 10/26/2021); upper gi endoscopy,diagnosis (N/A, 10/26/2021); peter by laparoscopy (N/A, 10/28/2021); and  "ercp,diagnostic (N/A, 1/14/2022).    SOCIAL HISTORY  Social History     Tobacco Use    Smoking status: Current Every Day Smoker     Packs/day: 0.50     Years: 30.00     Pack years: 15.00     Types: Cigarettes    Smokeless tobacco: Never Used   Vaping Use    Vaping Use: Never used   Substance Use Topics    Alcohol use: Not Currently    Drug use: Yes     Comment: Marijuana - rare      Social History     Substance and Sexual Activity   Drug Use Yes    Comment: Marijuana - rare     FAMILY HISTORY  Family History   Problem Relation Age of Onset    Hypertension Mother     Cancer Father      CURRENT MEDICATIONS  Home Medications    **Home medications have not yet been reviewed for this encounter**       ALLERGIES  Allergies   Allergen Reactions    Tape Unspecified     Blisters, paper tape is ok       PHYSICAL EXAM  VITAL SIGNS: /91   Pulse 75   Temp 36.8 °C (98.3 °F) (Temporal)   Resp 16   Ht 1.676 m (5' 6\")   Wt 55 kg (121 lb 4.1 oz)   LMP 04/29/2005 (LMP Unknown)   SpO2 99%   BMI 19.57 kg/m²   Constitutional: Well developed, Well nourished, No acute distress, Non-toxic appearance.   HEENT: Normocephalic, Atraumatic,  external ears normal, pharynx pink, Dry mucous membranes, No rhinorrhea or mucosal edema  Eyes: PERRL, EOMI, Conjunctiva normal, No discharge.   Neck: Normal range of motion, No tenderness, Supple, No stridor.   Lymphatic: No lymphadenopathy    Cardiovascular: Regular Rate and Rhythm, No murmurs,  rubs, or gallops.   Thorax & Lungs: Lungs clear to auscultation bilaterally, No respiratory distress, No wheezes, rhales or rhonchi, No chest wall tenderness.   Abdomen: Bowel sounds normal, Soft, Mild diffuse abdominal tenderness, non distended,  No pulsatile masses, no rebound guarding or peritoneal signs.   Skin: Warm, Dry, No erythema, No rash,   Back:  No CVA tenderness,  No spinal tenderness, bony crepitance, step offs, or instability.   Neurologic: Alert & oriented clear speech no focal " deficits  Extremities: Equal, intact distal pulses, No cyanosis, clubbing or edema,  No tenderness.   Musculoskeletal: Good range of motion in all major joints. No tenderness to palpation or major deformities noted.     DIAGNOSTIC STUDIES / PROCEDURES    LABS  Results for orders placed or performed during the hospital encounter of 05/05/22   CBC WITH DIFFERENTIAL   Result Value Ref Range    WBC 7.7 4.8 - 10.8 K/uL    RBC 3.65 (L) 4.20 - 5.40 M/uL    Hemoglobin 11.7 (L) 12.0 - 16.0 g/dL    Hematocrit 35.8 (L) 37.0 - 47.0 %    MCV 98.1 (H) 81.4 - 97.8 fL    MCH 32.1 27.0 - 33.0 pg    MCHC 32.7 (L) 33.6 - 35.0 g/dL    RDW 48.8 35.9 - 50.0 fL    Platelet Count 320 164 - 446 K/uL    MPV 9.9 9.0 - 12.9 fL    Neutrophils-Polys 70.60 44.00 - 72.00 %    Lymphocytes 16.50 (L) 22.00 - 41.00 %    Monocytes 9.80 0.00 - 13.40 %    Eosinophils 1.20 0.00 - 6.90 %    Basophils 1.40 0.00 - 1.80 %    Immature Granulocytes 0.50 0.00 - 0.90 %    Nucleated RBC 0.00 /100 WBC    Neutrophils (Absolute) 5.40 2.00 - 7.15 K/uL    Lymphs (Absolute) 1.26 1.00 - 4.80 K/uL    Monos (Absolute) 0.75 0.00 - 0.85 K/uL    Eos (Absolute) 0.09 0.00 - 0.51 K/uL    Baso (Absolute) 0.11 0.00 - 0.12 K/uL    Immature Granulocytes (abs) 0.04 0.00 - 0.11 K/uL    NRBC (Absolute) 0.00 K/uL   COMP METABOLIC PANEL   Result Value Ref Range    Sodium 129 (L) 135 - 145 mmol/L    Potassium 5.4 3.6 - 5.5 mmol/L    Chloride 98 96 - 112 mmol/L    Co2 22 20 - 33 mmol/L    Anion Gap 9.0 7.0 - 16.0    Glucose 405 (H) 65 - 99 mg/dL    Bun 18 8 - 22 mg/dL    Creatinine 0.88 0.50 - 1.40 mg/dL    Calcium 8.3 (L) 8.4 - 10.2 mg/dL    AST(SGOT) 57 (H) 12 - 45 U/L    ALT(SGPT) 51 (H) 2 - 50 U/L    Alkaline Phosphatase 1009 (H) 30 - 99 U/L    Total Bilirubin 0.8 0.1 - 1.5 mg/dL    Albumin 2.9 (L) 3.2 - 4.9 g/dL    Total Protein 6.5 6.0 - 8.2 g/dL    Globulin 3.6 (H) 1.9 - 3.5 g/dL    A-G Ratio 0.8 g/dL   LIPASE   Result Value Ref Range    Lipase 7 7 - 58 U/L   URINALYSIS CULTURE, IF  INDICATED    Specimen: Urine, Clean Catch   Result Value Ref Range    Color Straw     Character Clear     Specific Gravity 1.015 <1.035    Ph 6.5 5.0 - 8.0    Glucose >=1000 (A) Negative mg/dL    Ketones Negative Negative mg/dL    Protein 30 (A) Negative mg/dL    Bilirubin Negative Negative    Nitrite Negative Negative    Leukocyte Esterase Negative Negative    Occult Blood Trace (A) Negative    Micro Urine Req Microscopic    LACTIC ACID   Result Value Ref Range    Lactic Acid 1.2 0.5 - 2.0 mmol/L   URINE MICROSCOPIC (W/UA)   Result Value Ref Range    WBC Rare /hpf    RBC 0-2 /hpf    Bacteria Few (A) None /hpf    Epithelial Cells Moderate (A) Few /hpf    Urine Crystals Few Amorphous /hpf   ESTIMATED GFR   Result Value Ref Range    GFR (CKD-EPI) 73 >60 mL/min/1.73 m 2   EKG (NOW)   Result Value Ref Range    Report       [facility] Emergency Dept.    Test Date:  2022  Pt Name:    KALPANA BUTTS               Department: [facility]  MRN:        [id number]                      [facility]  Gender:     Female                       Technician: BOO  :        1955                   Requested By:MILO SRIVASTAVA  Order #:    [id number]                    Reading MD: MILO SRIVASTAVA MD    Measurements  Intervals                                Axis  Rate:       73                           P:          60  WA:         140                          QRS:        31  QRSD:       93                           T:          43  QT:         375  QTc:        414    Interpretive Statements  Sinus rhythm  Compared to ECG 2022 03:16:17  No significant changes  Electronically Signed On 2022 17:42:34 PDT by MILO SRIVASTAVA MD       All labs reviewed by me.    EKG  12 lead EKG; Interpreted by emergency department physician    RADIOLOGY  CT-ABDOMEN-PELVIS WITH   Final Result      1.  The descending and rectosigmoid colon is decompressed, limiting evaluation for mild infectious or inflammatory wall  thickening.   2.  Mild bladder wall thickening on the right and posteriorly is not excluded. Correlation with urinalysis is recommended.   3.  Biliary ductal prominence is unchanged and likely related to ectasia in the setting of prior cholecystectomy.   4.  Atherosclerotic plaque.   5.  Hypodensity along the falciform ligament is mildly decreased in size compared to prior likely a small postsurgical fluid collection.        The radiologist's interpretation of all radiological studies have been reviewed by me.    COURSE & MEDICAL DECISION MAKING  Nursing notes, VS, PMSFHx reviewed in chart.    5:22 PM Patient seen and examined at bedside. Ordered CT-abdomen-pelvis w/, CBC w/ diff, CMP, Lipase, Lactic acid, UA culture, and EKG to evaluate her symptoms. The differential diagnoses include but are not limited to: Adhesions, partial bowel obstruction, colitis, diverticulitis, pancreatitis, or pyelonephritis.    6:32 PM Patient will be treated with NS infusion 1L.    8:18 PM Patient will be treated with Zofran 4mg and morphine 4mg.    8:30 PM I reevaluated the patient at bedside. Notified patient that her lab and imaging results have no significant changes from prior. I discussed plan for discharge and follow up with GI as outlined below. The patient is stable for discharge at this time and will return for any new or worsening symptoms. Patient verbalizes understanding and support with my plan for discharge.    HYDRATION: Based on the patient's presentation of Acute Vomiting, fluid loss, and hyponatremia the patient was given IV fluids. IV Hydration was used because oral hydration was not adequate alone. Upon recheck following hydration, the patient was improved.    The patient is referred to a primary physician for blood pressure management, diabetic screening, and for all other preventative health concerns.    DISPOSITION:  Patient will be discharged home in stable condition.    FOLLOW UP:  Jarrell Yates M.D.  [address]  [facility]  [address]  [place] [address]  [phone]    Call in 1 day  for recheck    [facility]  [address]  [place] [address]  [phone]  Call in 1 day  for recheck    OUTPATIENT MEDICATIONS:  Discharge Medication List as of 5/5/2022  8:55 PM        START taking these medications    Details   !! ondansetron (ZOFRAN ODT) 4 MG TABLET DISPERSIBLE Take 1 Tablet by mouth every 6 hours as needed for Nausea., Disp-10 Tablet, R-0, Normal       !! - Potential duplicate medications found. Please discuss with provider.        FINAL IMPRESSION  1. Non-intractable vomiting with nausea, unspecified vomiting type        I, Nando Klein (Scribe), am scribing for, and in the presence of, Iliana Gandhi M.D..    Electronically signed by: Nando Klein (Scribe), 5/5/2022    IIliana M.D. personally performed the services described in this documentation, as scribed by Nando Klein in my presence, and it is both accurate and complete. C.    The note accurately reflects work and decisions made by me.  Iliana Gandhi M.D.  5/5/2022  11:41 PM

## 2022-05-10 ENCOUNTER — HOSPITAL ENCOUNTER (OUTPATIENT)
Dept: RADIOLOGY | Facility: MEDICAL CENTER | Age: 67
End: 2022-05-10
Attending: INTERNAL MEDICINE
Payer: MEDICARE

## 2022-05-10 DIAGNOSIS — Z12.31 VISIT FOR SCREENING MAMMOGRAM: ICD-10-CM

## 2022-05-10 PROCEDURE — 77063 BREAST TOMOSYNTHESIS BI: CPT

## 2022-05-24 ENCOUNTER — APPOINTMENT (OUTPATIENT)
Dept: NEPHROLOGY | Facility: MEDICAL CENTER | Age: 67
End: 2022-05-24
Payer: MEDICARE

## 2022-06-02 ENCOUNTER — HOSPITAL ENCOUNTER (OUTPATIENT)
Dept: LAB | Facility: MEDICAL CENTER | Age: 67
End: 2022-06-02
Attending: NURSE PRACTITIONER
Payer: MEDICARE

## 2022-06-02 LAB
T3FREE SERPL-MCNC: 1.57 PG/ML (ref 2–4.4)
TSH SERPL DL<=0.005 MIU/L-ACNC: 13.1 UIU/ML (ref 0.38–5.33)

## 2022-06-02 PROCEDURE — 36415 COLL VENOUS BLD VENIPUNCTURE: CPT

## 2022-06-02 PROCEDURE — 84439 ASSAY OF FREE THYROXINE: CPT

## 2022-06-02 PROCEDURE — 84443 ASSAY THYROID STIM HORMONE: CPT

## 2022-06-02 PROCEDURE — 84481 FREE ASSAY (FT-3): CPT

## 2022-06-06 LAB — T4 FREE SERPL DIALY-MCNC: 2.2 NG/DL (ref 1.1–2.4)

## 2022-06-30 ENCOUNTER — HOSPITAL ENCOUNTER (OUTPATIENT)
Facility: MEDICAL CENTER | Age: 67
End: 2022-06-30
Attending: INTERNAL MEDICINE
Payer: MEDICARE

## 2022-06-30 LAB
CREAT UR-MCNC: 157.5 MG/DL
MICROALBUMIN UR-MCNC: >440 MG/DL
MICROALBUMIN/CREAT UR: NORMAL MG/G (ref 0–30)

## 2022-06-30 PROCEDURE — 82570 ASSAY OF URINE CREATININE: CPT

## 2022-06-30 PROCEDURE — 82043 UR ALBUMIN QUANTITATIVE: CPT

## 2022-07-05 ENCOUNTER — OFFICE VISIT (OUTPATIENT)
Dept: NEPHROLOGY | Facility: MEDICAL CENTER | Age: 67
End: 2022-07-05
Payer: MEDICARE

## 2022-07-05 VITALS
HEART RATE: 69 BPM | DIASTOLIC BLOOD PRESSURE: 72 MMHG | OXYGEN SATURATION: 99 % | WEIGHT: 123 LBS | SYSTOLIC BLOOD PRESSURE: 140 MMHG | HEIGHT: 66 IN | TEMPERATURE: 98.1 F | BODY MASS INDEX: 19.77 KG/M2

## 2022-07-05 DIAGNOSIS — E87.1 HYPONATREMIA: ICD-10-CM

## 2022-07-05 DIAGNOSIS — I10 ESSENTIAL HYPERTENSION: ICD-10-CM

## 2022-07-05 DIAGNOSIS — N18.9 CHRONIC KIDNEY DISEASE, UNSPECIFIED CKD STAGE: ICD-10-CM

## 2022-07-05 DIAGNOSIS — E03.9 HYPOTHYROIDISM, UNSPECIFIED TYPE: ICD-10-CM

## 2022-07-05 PROCEDURE — 99214 OFFICE O/P EST MOD 30 MIN: CPT | Performed by: INTERNAL MEDICINE

## 2022-07-05 RX ORDER — HYDROCHLOROTHIAZIDE 25 MG/1
25 TABLET ORAL
COMMUNITY
Start: 2022-06-08 | End: 2023-08-28

## 2022-07-05 ASSESSMENT — ENCOUNTER SYMPTOMS
FEVER: 0
SHORTNESS OF BREATH: 0
CHILLS: 0
COUGH: 0
NAUSEA: 0
VOMITING: 0
HYPERTENSION: 1
BACK PAIN: 1

## 2022-07-05 ASSESSMENT — FIBROSIS 4 INDEX: FIB4 SCORE: 1.62

## 2022-07-05 NOTE — PROGRESS NOTES
"Subjective     Anu Manuel is a 67 y.o. female who presents with Chronic Kidney Disease and Hypertension            Chronic Kidney Disease  This is a chronic problem. The current episode started more than 1 year ago. The problem occurs constantly. The problem has been unchanged. Pertinent negatives include no chest pain, chills, coughing, fever, nausea, urinary symptoms or vomiting.   Hypertension  This is a chronic problem. The current episode started more than 1 year ago. The problem has been waxing and waning since onset. The problem is uncontrolled. Pertinent negatives include no chest pain, malaise/fatigue or shortness of breath. Agents associated with hypertension include thyroid hormones. Risk factors for coronary artery disease include stress. Past treatments include beta blockers and diuretics. The current treatment provides significant improvement. There are no compliance problems.  Hypertensive end-organ damage includes kidney disease. Identifiable causes of hypertension include chronic renal disease.       Review of Systems   Constitutional: Negative for chills, fever and malaise/fatigue.   Respiratory: Negative for cough and shortness of breath.    Cardiovascular: Negative for chest pain and leg swelling.   Gastrointestinal: Negative for nausea and vomiting.   Genitourinary: Negative for dysuria, frequency and urgency.   Musculoskeletal: Positive for back pain.              Objective     BP (!) 140/72 (BP Location: Right arm, Patient Position: Sitting)   Pulse 69   Temp 36.7 °C (98.1 °F) (Temporal)   Ht 1.676 m (5' 6\")   Wt 55.8 kg (123 lb)   LMP 04/29/2005 (LMP Unknown)   SpO2 99%   BMI 19.85 kg/m²      Physical Exam  Vitals and nursing note reviewed.   Constitutional:       General: She is not in acute distress.     Appearance: Normal appearance. She is well-developed. She is not diaphoretic.   HENT:      Head: Normocephalic and atraumatic.      Right Ear: External ear normal.      " "Left Ear: External ear normal.      Nose: Nose normal.   Eyes:      General: No scleral icterus.        Right eye: No discharge.         Left eye: No discharge.      Conjunctiva/sclera: Conjunctivae normal.   Cardiovascular:      Rate and Rhythm: Normal rate and regular rhythm.      Heart sounds: No murmur heard.  Pulmonary:      Effort: Pulmonary effort is normal. No respiratory distress.      Breath sounds: Normal breath sounds.   Musculoskeletal:         General: No tenderness.      Right lower leg: No edema.      Left lower leg: No edema.   Skin:     General: Skin is warm and dry.      Findings: No erythema.   Neurological:      General: No focal deficit present.      Mental Status: She is alert and oriented to person, place, and time.      Cranial Nerves: No cranial nerve deficit.   Psychiatric:         Mood and Affect: Mood normal.         Behavior: Behavior normal.       Past Medical History:   Diagnosis Date   • Adverse effect of anesthesia     in 2008 \"throat closes up\"\"anxiety\" ?laryngospasm, kept in ICU. Pt states no problems currently 2018.    • Anesthesia     in 2008 \"throat closes up\"\"anxiety\" ?laryngospasm, kept in ICU. Pt states no problems currently 2018.    • Arthritis     right shoulder, hands   • Cigarette smoker quit 2013   • Dental disorder     missing teeth    • depression 8/30/2016    denies depression, states has anxiety and panic attacks   • Diabetes mellitus type 1 (HCC) 1989    insulin   • Encounter for long-term (current) use of insulin ([facility]) 9/25/2013   • Heart burn    • High cholesterol    • Hypertension    • Hypothyroidism, postsurgical 1970   • Indigestion    • Infectious disease      had hepatitis C, tested neg.   • Joint replacement     cervical   • Liver failure (HCC)    • Pain     \"fibromyalgia\";lower back, right leg   • Polyneuropathy in diabetes(357.2) 6/10/2015   • Status post appendectomy    • Type I (juvenile type) diabetes mellitus with neurological manifestations, " uncontrolled(250.63) 6/10/2015     Social History     Socioeconomic History   • Marital status:      Spouse name: Not on file   • Number of children: Not on file   • Years of education: Not on file   • Highest education level: Not on file   Occupational History   • Not on file   Tobacco Use   • Smoking status: Current Every Day Smoker     Packs/day: 0.50     Years: 30.00     Pack years: 15.00     Types: Cigarettes   • Smokeless tobacco: Never Used   Vaping Use   • Vaping Use: Never used   Substance and Sexual Activity   • Alcohol use: Not Currently   • Drug use: Yes     Comment: Marijuana - rare   • Sexual activity: Not on file   Other Topics Concern   • Not on file   Social History Narrative   • Not on file     Social Determinants of Health     Financial Resource Strain: Low Risk    • Difficulty of Paying Living Expenses: Not very hard   Food Insecurity: No Food Insecurity   • Worried About Running Out of Food in the Last Year: Never true   • Ran Out of Food in the Last Year: Never true   Transportation Needs: No Transportation Needs   • Lack of Transportation (Medical): No   • Lack of Transportation (Non-Medical): No   Physical Activity: Not on file   Stress: Not on file   Social Connections: Not on file   Intimate Partner Violence: Not on file   Housing Stability: Not on file     Family History   Problem Relation Age of Onset   • Hypertension Mother    • Cancer Father      Recent Labs     09/27/21  0400 09/28/21  0012 10/25/21  0920 10/26/21  0308 02/16/22  1158 02/23/22  0052 04/08/22  1402 05/05/22  1800   ALBUMIN  --    < >  --    < >  --  4.0 3.2 2.9*   HDL  --   --   --   --  130  --   --   --    TRIGLYCERIDE  --   --   --   --  83  --   --   --    SODIUM  --    < >  --    < >  --  138 139 129*   POTASSIUM  --    < >  --    < >  --  3.6 4.2 5.4   CHLORIDE  --    < >  --    < >  --  100 103 98   CO2  --    < >  --    < >  --  25 23 22   BUN  --    < >  --    < >  --  17 14 18   CREATININE  --    < >   --    < >  --  0.93 0.89 0.88   PHOSPHORUS 2.9  --  3.5  --  2.7  --   --   --     < > = values in this interval not displayed.                           Assessment & Plan        1. Essential hypertension  Slightly higher than our goal, possibly related to stress and uncontrolled hypothyroidism  Continue low-sodium diet  Check blood pressure at home regularly    2. Chronic kidney disease, unspecified CKD stage  Stable  No uremic symptoms  Renal dose of medication  Avoid nephrotoxins  Continue same medication regimen  Check labs annually    3. Hyponatremia  Secondary to hypothyroidism  Follow-up lab, if hyponatremia persists then we will discontinue hydrochlorothiazide    4. Hypothyroidism, unspecified type  Optimized thyroid disease management

## 2022-07-10 ENCOUNTER — OFFICE VISIT (OUTPATIENT)
Dept: URGENT CARE | Facility: CLINIC | Age: 67
End: 2022-07-10
Payer: MEDICARE

## 2022-07-10 VITALS
SYSTOLIC BLOOD PRESSURE: 104 MMHG | DIASTOLIC BLOOD PRESSURE: 60 MMHG | TEMPERATURE: 97.2 F | RESPIRATION RATE: 15 BRPM | BODY MASS INDEX: 19.83 KG/M2 | HEART RATE: 75 BPM | WEIGHT: 123.4 LBS | OXYGEN SATURATION: 97 % | HEIGHT: 66 IN

## 2022-07-10 DIAGNOSIS — H92.02 LEFT EAR PAIN: ICD-10-CM

## 2022-07-10 DIAGNOSIS — K08.89 PAIN, DENTAL: ICD-10-CM

## 2022-07-10 DIAGNOSIS — R51.9 FACIAL PAIN: ICD-10-CM

## 2022-07-10 PROCEDURE — 99214 OFFICE O/P EST MOD 30 MIN: CPT | Performed by: NURSE PRACTITIONER

## 2022-07-10 RX ORDER — HYDROCODONE BITARTRATE AND ACETAMINOPHEN 5; 325 MG/1; MG/1
1 TABLET ORAL EVERY 6 HOURS
Qty: 12 TABLET | Refills: 0 | Status: SHIPPED | OUTPATIENT
Start: 2022-07-10 | End: 2022-07-13

## 2022-07-10 RX ORDER — HYDROCODONE BITARTRATE AND ACETAMINOPHEN 5; 325 MG/1; MG/1
1 TABLET ORAL EVERY 6 HOURS
Qty: 12 TABLET | Refills: 0 | Status: SHIPPED | OUTPATIENT
Start: 2022-07-10 | End: 2022-07-10 | Stop reason: SDUPTHER

## 2022-07-10 RX ORDER — AMOXICILLIN 500 MG/1
500 CAPSULE ORAL 2 TIMES DAILY
Qty: 20 CAPSULE | Refills: 0 | Status: SHIPPED | OUTPATIENT
Start: 2022-07-10 | End: 2022-07-20

## 2022-07-10 ASSESSMENT — ENCOUNTER SYMPTOMS
SHORTNESS OF BREATH: 0
DIZZINESS: 0
EYE PAIN: 0
CHILLS: 0
SORE THROAT: 0
MYALGIAS: 0
NAUSEA: 0
VOMITING: 0
FEVER: 0

## 2022-07-10 ASSESSMENT — FIBROSIS 4 INDEX: FIB4 SCORE: 1.62

## 2022-07-11 NOTE — PROGRESS NOTES
Subjective:   Anu Manuel is a 67 y.o. female who presents for Otalgia (Left ear )      Otalgia   Pertinent negatives include no rash, sore throat or vomiting.       Review of Systems   Constitutional: Negative for chills and fever.   HENT: Positive for ear pain. Negative for sore throat.         Dental pain     Eyes: Negative for pain.   Respiratory: Negative for shortness of breath.    Cardiovascular: Negative for chest pain.   Gastrointestinal: Negative for nausea and vomiting.   Genitourinary: Negative for hematuria.   Musculoskeletal: Negative for myalgias.   Skin: Negative for rash.   Neurological: Negative for dizziness.       Medications:    • amoxicillin Caps  • ezetimibe Tabs  • hydroCHLOROthiazide Tabs  • HYDROcodone-acetaminophen Tabs  • metoprolol SR Tb24  • NovoLOG FlexPen Sopn  • Synthroid Tabs  • traZODone Tabs  • Tresiba FlexTouch Sopn  • vitamin D3 Tabs    Allergies: Tape    Problem List: Anu Manuel does not have any pertinent problems on file.    Surgical History:  Past Surgical History:   Procedure Laterality Date   • FL ERCP,DIAGNOSTIC N/A 1/14/2022    Procedure: ERCP, DIAGNOSTIC - WITH SIGMOID COLON BIOPSY AND STENT REMOVAL;  Surgeon: Cr Haro M.D.;  Location: SURGERY [facility];  Service: Gastroenterology   • THADDEUS BY LAPAROSCOPY N/A 10/28/2021    Procedure: CHOLECYSTECTOMY, LAPAROSCOPIC;  Surgeon: Tello Trammell M.D.;  Location: SURGERY [facility];  Service: General   • FL ERCP,DIAGNOSTIC N/A 10/26/2021    Procedure: ERCP (ENDOSCOPIC RETROGRADE CHOLANGIOPANCREATOGRAPHY);  Surgeon: Cr Haro M.D.;  Location: SURGERY SAME DAY [facility];  Service: Gastroenterology   • FL UPPER GI ENDOSCOPY,BIOPSY N/A 10/26/2021    Procedure: GASTROSCOPY, WITH BIOPSY;  Surgeon: Cr Haro M.D.;  Location: SURGERY SAME DAY [facility];  Service: Gastroenterology   • FL UPPER GI ENDOSCOPY,DIAGNOSIS N/A 10/26/2021    Procedure: GASTROSCOPY;  Surgeon: Cr Haro M.D.;  Location:  SURGERY SAME DAY [facility];  Service: Gastroenterology   • CATH PLACEMENT  1/25/2020    Procedure: INSERTION, CATHETER PERM;  Surgeon: Rola Mendoza M.D.;  Location: SURGERY [facility];  Service: General   • IRRIGATION & DEBRIDEMENT NEURO  1/19/2020    Procedure: IRRIGATION AND DEBRIDEMENT, WOUND THORACIC AND LUMBAR;  Surgeon: Ryan Roman M.D.;  Location: [facility];  Service: Neurosurgery   • PB IMPLANT NEUROSTIM/ N/A 12/16/2019    Procedure: EXPLORATION AT THORACIC 8 - 9, REPLACEMENT OF  NEUROSTIMULATOR LEAD;  Surgeon: Ryan Roman M.D.;  Location: [facility];  Service: Neurosurgery   • SPINAL CORD STIMULATOR N/A 10/26/2018    Procedure: SPINAL CORD STIMULATOR;  Surgeon: Ryan Roman M.D.;  Location: [facility];  Service: Neurosurgery   • THORACIC LAMINECTOMY N/A 10/26/2018    Procedure: THORACIC LAMINECTOMY - FOR;  Surgeon: Ryan Roman M.D.;  Location: [facility];  Service: Neurosurgery   • NJ NJX AA&/STRD TFRML EPI LUMBAR/SACRAL 1 LEVEL Right 8/31/2016    Procedure: INJ-FORAMEN EPI LUM/SAC SNGL L4-5;  Surgeon: Sukhi Godfrey M.D.;  Location: [facility];  Service: Pain Management   • LUMBAR LAMINECTOMY DISKECTOMY Right 5/10/2016    Procedure: LUMBAR L4-5 HEMILAMINECTOMY DISKECTOMY ;  Surgeon: Arnold Keyes M.D.;  Location: [facility];  Service:    • CERVICAL FUSION POSTERIOR  1/16/2009    Performed by TARA CONTRERAS at [facility]   • HARDWARE REMOVAL NEURO  1/16/2009    Performed by TARA CONTRERAS at [facility]   • NECK EXPLORATION  1/16/2009    Performed by TARA CONTRERAS at [facility]   • SHOULDER ARTHROSCOPY W/ ROTATOR CUFF REPAIR  10/9/08    Performed by SHERLY CASTANEDA at [facility]   • SHOULDER DECOMPRESSION ARTHROSCOPIC  6/17/08    Performed by SHERLY CASTANEDA at [facility]   • CLAVICLE DISTAL EXCISION  6/17/08    Performed  "by SHERLY CASTANEDA at SURGERY [facility] ORS   • CERVICAL DISK AND FUSION ANTERIOR  03/12/08   • HYSTERECTOMY, VAGINAL  2006   • APPENDECTOMY  2004   • THYROID LOBECTOMY  1973   • TONSILLECTOMY  1963   • ABDOMINAL HYSTERECTOMY TOTAL     • LUMPECTOMY  1976, 2005    Breast    • LUMPECTOMY         Past Social Hx: Anu Manuel  reports that she has been smoking cigarettes. She has a 15.00 pack-year smoking history. She has never used smokeless tobacco. She reports previous alcohol use. She reports current drug use.     Past Family Hx:  Anu Manuel family history includes Cancer in her father; Hypertension in her mother.     Problem list, medications, and allergies reviewed by myself today in Epic.     Objective:     /60 (BP Location: Right arm, Patient Position: Sitting, BP Cuff Size: Adult long)   Pulse 75   Temp 36.2 °C (97.2 °F) (Temporal)   Resp 15   Ht 1.676 m (5' 6\")   Wt 56 kg (123 lb 6.4 oz)   LMP 04/29/2005 (LMP Unknown)   SpO2 97%   BMI 19.92 kg/m²     Physical Exam  Constitutional:       Appearance: Normal appearance. She is not ill-appearing or toxic-appearing.   HENT:      Head: Normocephalic.      Right Ear: External ear normal.      Left Ear: External ear normal.      Nose: Nose normal.      Right Sinus: No maxillary sinus tenderness.      Left Sinus: No maxillary sinus tenderness.      Mouth/Throat:      Lips: Pink.      Mouth: Mucous membranes are moist.      Dentition: Has dentures.      Comments: Dentures removed, tenderness along the left upper gumline, no abscess.  All teeth extracted on the upper aspect  Eyes:      General: Lids are normal.         Right eye: No discharge.         Left eye: No discharge.   Pulmonary:      Effort: Pulmonary effort is normal. No accessory muscle usage or respiratory distress.   Musculoskeletal:         General: Normal range of motion.      Cervical back: Full passive range of motion without pain.   Skin:     Coloration: Skin is not " pale.   Neurological:      Mental Status: She is alert and oriented to person, place, and time.   Psychiatric:         Mood and Affect: Mood normal.         Thought Content: Thought content normal.         Assessment/Plan:     Diagnosis and associated orders:     1. Pain, dental  HYDROcodone-acetaminophen (NORCO) 5-325 MG Tab per tablet    amoxicillin (AMOXIL) 500 MG Cap    DISCONTINUED: HYDROcodone-acetaminophen (NORCO) 5-325 MG Tab per tablet   2. Facial pain     3. Left ear pain        Comments/MDM:     • Suspect infectious process will start on antibiotics.  Patient will follow-up with dentist as this could be an early formation of an abscess  •   • In prescribing controlled substances to this patient, I certify that I have obtained and reviewed the medical history of Anu Manuel. I have also made a good sindhu effort to obtain applicable records from other providers who have treated the patient and records did not demonstrate any increased risk of substance abuse that would prevent me from prescribing controlled substances.     I have conducted a physical exam and documented it. I have reviewed Ms. Manuel’s prescription history as maintained by the [place] Prescription Monitoring Program.     I have assessed the patient’s risk for abuse, dependency, and addiction using the validated Opioid Risk Tool available at https://www.mdcalc.com/kbglow-djsi-auot-ort-narcotic-abuse.     Given the above, I believe the benefits of controlled substance therapy outweigh the risks. The reasons for prescribing controlled substances include non-narcotic, oral analgesic alternatives have been inadequate for pain control. Accordingly, I have discussed the risk and benefits, treatment plan, and alternative therapies with the patient.   • Differential diagnosis, natural history, supportive care, and indications for immediate follow-up discussed.   •            Please note that this dictation was created using voice  recognition software. I have made a reasonable attempt to correct obvious errors, but I expect that there are errors of grammar and possibly content that I did not discover before finalizing the note.    This note was electronically signed by Dieter DELGADO.

## 2022-07-13 ENCOUNTER — HOSPITAL ENCOUNTER (OUTPATIENT)
Dept: LAB | Facility: MEDICAL CENTER | Age: 67
End: 2022-07-13
Attending: NURSE PRACTITIONER
Payer: MEDICARE

## 2022-07-13 LAB
ALBUMIN SERPL BCP-MCNC: 3.7 G/DL (ref 3.2–4.9)
ALBUMIN/GLOB SERPL: 1 G/DL
ALP SERPL-CCNC: 1177 U/L (ref 30–99)
ALT SERPL-CCNC: 104 U/L (ref 2–50)
ANION GAP SERPL CALC-SCNC: 10 MMOL/L (ref 7–16)
AST SERPL-CCNC: 108 U/L (ref 12–45)
BILIRUB SERPL-MCNC: 1.2 MG/DL (ref 0.1–1.5)
BUN SERPL-MCNC: 23 MG/DL (ref 8–22)
CALCIUM SERPL-MCNC: 9.1 MG/DL (ref 8.5–10.5)
CHLORIDE SERPL-SCNC: 101 MMOL/L (ref 96–112)
CO2 SERPL-SCNC: 29 MMOL/L (ref 20–33)
CREAT SERPL-MCNC: 1.16 MG/DL (ref 0.5–1.4)
GFR SERPLBLD CREATININE-BSD FMLA CKD-EPI: 52 ML/MIN/1.73 M 2
GLOBULIN SER CALC-MCNC: 3.6 G/DL (ref 1.9–3.5)
GLUCOSE SERPL-MCNC: 105 MG/DL (ref 65–99)
PHOSPHATE SERPL-MCNC: 4.3 MG/DL (ref 2.5–4.5)
POTASSIUM SERPL-SCNC: 3.1 MMOL/L (ref 3.6–5.5)
PROT SERPL-MCNC: 7.3 G/DL (ref 6–8.2)
PTH-INTACT SERPL-MCNC: 20.6 PG/ML (ref 14–72)
SODIUM SERPL-SCNC: 140 MMOL/L (ref 135–145)
T3FREE SERPL-MCNC: 1.53 PG/ML (ref 2–4.4)
TSH SERPL DL<=0.005 MIU/L-ACNC: 17.1 UIU/ML (ref 0.38–5.33)

## 2022-07-13 PROCEDURE — 83970 ASSAY OF PARATHORMONE: CPT

## 2022-07-13 PROCEDURE — 84443 ASSAY THYROID STIM HORMONE: CPT

## 2022-07-13 PROCEDURE — 84100 ASSAY OF PHOSPHORUS: CPT

## 2022-07-13 PROCEDURE — 82570 ASSAY OF URINE CREATININE: CPT

## 2022-07-13 PROCEDURE — 36415 COLL VENOUS BLD VENIPUNCTURE: CPT

## 2022-07-13 PROCEDURE — 82043 UR ALBUMIN QUANTITATIVE: CPT

## 2022-07-13 PROCEDURE — 84481 FREE ASSAY (FT-3): CPT

## 2022-07-13 PROCEDURE — 80053 COMPREHEN METABOLIC PANEL: CPT

## 2022-07-13 PROCEDURE — 84439 ASSAY OF FREE THYROXINE: CPT

## 2022-07-14 LAB
CREAT UR-MCNC: 195.13 MG/DL
MICROALBUMIN UR-MCNC: >440 MG/DL
MICROALBUMIN/CREAT UR: NORMAL MG/G (ref 0–30)

## 2022-07-16 ENCOUNTER — HOSPITAL ENCOUNTER (EMERGENCY)
Facility: MEDICAL CENTER | Age: 67
End: 2022-07-16
Payer: MEDICARE

## 2022-07-16 VITALS
WEIGHT: 123.46 LBS | TEMPERATURE: 97.8 F | OXYGEN SATURATION: 99 % | RESPIRATION RATE: 20 BRPM | BODY MASS INDEX: 19.84 KG/M2 | HEART RATE: 74 BPM | SYSTOLIC BLOOD PRESSURE: 157 MMHG | DIASTOLIC BLOOD PRESSURE: 87 MMHG | HEIGHT: 66 IN

## 2022-07-16 PROCEDURE — 302449 STATCHG TRIAGE ONLY (STATISTIC)

## 2022-07-16 ASSESSMENT — FIBROSIS 4 INDEX: FIB4 SCORE: 2.15

## 2022-07-16 ASSESSMENT — PAIN DESCRIPTION - DESCRIPTORS: DESCRIPTORS: ACHING

## 2022-07-17 ENCOUNTER — HOSPITAL ENCOUNTER (EMERGENCY)
Facility: MEDICAL CENTER | Age: 67
End: 2022-07-17
Attending: EMERGENCY MEDICINE
Payer: MEDICARE

## 2022-07-17 VITALS
BODY MASS INDEX: 19.66 KG/M2 | OXYGEN SATURATION: 99 % | SYSTOLIC BLOOD PRESSURE: 154 MMHG | RESPIRATION RATE: 14 BRPM | TEMPERATURE: 98.8 F | HEART RATE: 71 BPM | WEIGHT: 122.36 LBS | HEIGHT: 66 IN | DIASTOLIC BLOOD PRESSURE: 82 MMHG

## 2022-07-17 DIAGNOSIS — H57.9 ITCHY EYES: ICD-10-CM

## 2022-07-17 DIAGNOSIS — L30.9 DERMATITIS: ICD-10-CM

## 2022-07-17 LAB — T4 FREE SERPL DIALY-MCNC: 2.1 NG/DL (ref 1.1–2.4)

## 2022-07-17 PROCEDURE — 99282 EMERGENCY DEPT VISIT SF MDM: CPT

## 2022-07-17 RX ORDER — METHYLPREDNISOLONE 4 MG/1
TABLET ORAL
Qty: 1 EACH | Refills: 0 | Status: SHIPPED | OUTPATIENT
Start: 2022-07-17 | End: 2022-08-08

## 2022-07-17 ASSESSMENT — FIBROSIS 4 INDEX: FIB4 SCORE: 2.15

## 2022-07-17 ASSESSMENT — PAIN DESCRIPTION - PAIN TYPE: TYPE: ACUTE PAIN

## 2022-07-17 NOTE — ED TRIAGE NOTES
"Presents complaining of \"cellulitis\" affecting her legs, and arms developing for approximately a month.  She describes experiencing pain, redness, and swelling in her extremities. She also reports exposure to bed bugs with a body \"rash\" persisting for the past 2 weeks.  Chief Complaint   Patient presents with   • Leg Pain   • Arm Pain   • Rash     BP (!) 157/87   Pulse 74   Temp 36.6 °C (97.8 °F) (Temporal)   Resp 20   Ht 1.676 m (5' 6\")   Wt 56 kg (123 lb 7.3 oz)   LMP 04/29/2005 (LMP Unknown)   SpO2 99%   BMI 19.93 kg/m²    Has this patient been vaccinated for COVID NO  If not, would they like to be vaccinated while in the ER if eligible?  NO  Would the patient like to speak with the ERP about the possibility of receiving the COVID vaccine today before making a decision? NO     "

## 2022-07-18 NOTE — DISCHARGE INSTRUCTIONS
For your itchy eyes, use the eyedrops that you are eye doctor recommended.  Those are available over-the-counter.  Also continue your daily oral allergy pill.  Benadryl is also a strong antihistamine that can help with itching, and can be useful at night to help you sleep.    For your skin, use once or twice daily oatmeal baths, with warm, but not hot bath water, then immediately moisturize with a scent free, alcohol free eczema lotion.  You should do this 2 or 3 times per day as well, if possible.    For the generalized itching, since it has not been responding well to the things you have tried so far, use the oral steroid pack that we have prescribed.

## 2022-07-18 NOTE — ED NOTES
Patient is stable for discharge at this time, anticipatory guidance provided, close follow-up is encouraged, and ED return instructions have been detailed. Patient is both agreeable to the disposition and plan and discharged home in ambulatory state and in good condition.      Rx education provided, Pt verbalized understanding.

## 2022-07-18 NOTE — ED TRIAGE NOTES
"Chief Complaint   Patient presents with   • Leg Swelling     Pt presents for evaluation of bilateral lower extremity leg swelling (right greater than left). Pt states she was here yesterday for the same thing but left before being seen. Pt states the swelling has been on and off for 6 weeks and she has had increased pain in her BLE with this. Pt reports she saw her MD and he gave her \"water pills\" but they have not helped.   • Rash     Pt also reports a rash on her face, hairline, ant/post trunk, BUE and on her lower BLE. Pt states it does itch her. She has used OTC \"itch cream\" without relief.   • Eye Problem     Pt reports \"my eyes also have been burning.\" Pt states her eyelids have been burning, and she has been \"itching them a lot.\"      Blood Pressure (Abnormal) 146/79   Pulse 70   Temperature 37 °C (98.6 °F) (Temporal)   Respiration 16   Height 1.676 m (5' 6\")   Weight 55.5 kg (122 lb 5.7 oz)   Last Menstrual Period 04/29/2005 (LMP Unknown)   Oxygen Saturation 99%   Body Mass Index 19.75 kg/m²     "

## 2022-07-18 NOTE — ED PROVIDER NOTES
"ED Provider Note    Scribed for Wilman Trujillo M.D. by Shannan Pike. 7/17/2022,  8:22 PM.    CHIEF COMPLAINT  Chief Complaint   Patient presents with    Leg Swelling     Pt presents for evaluation of bilateral lower extremity leg swelling (right greater than left). Pt states she was here yesterday for the same thing but left before being seen. Pt states the swelling has been on and off for 6 weeks and she has had increased pain in her BLE with this. Pt reports she saw her MD and he gave her \"water pills\" but they have not helped.    Rash     Pt also reports a rash on her face, hairline, ant/post trunk, BUE and on her lower BLE. Pt states it does itch her. She has used OTC \"itch cream\" without relief.    Eye Problem     Pt reports \"my eyes also have been burning.\" Pt states her eyelids have been burning, and she has been \"itching them a lot.\"        HPI  Anu Manuel is a 67 y.o. female who presents to the Emergency Department for evaluation of a worsening body rash onset about 1 month prior to arrival. Patient states that about 1 month ago she had a bed bug incident at home. She states that she has since gotten rid of the problem, but more recently she's been having new rashes appear. She notes that the rash has spread to her face, chest, back, arms, and now legs. Patient also reports burning and itchy eyes. She reports that she takes allergy medication everyday, so she doesn't believe that is the problem. She admits to associated symptoms of itchiness and bilateral leg swelling, but denies fevers or chills. She states that she used itching cream, but still felt no relief. Also, she adds that she took a baking soda bath, but only felt worse.     REVIEW OF SYSTEMS  See HPI for further details.     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    SOCIAL HISTORY  Social History     Tobacco Use    Smoking status: Current Every Day Smoker     Packs/day: 0.50     Years: 30.00     Pack years: 15.00     Types: Cigarettes    Smokeless tobacco: Never Used   Vaping Use    Vaping Use: Never used   Substance and Sexual Activity    Alcohol use: Not Currently    Drug use: Yes     Comment: Marijuana - Rarely    Sexual activity: Not on file     Social History     Substance and Sexual Activity   Drug Use Yes    Comment: Marijuana - Rarely       SURGICAL HISTORY   has a past surgical history that includes hysterectomy, vaginal (2006); thyroid lobectomy (1973); lumpectomy (1976, 2005); cervical disk and fusion anterior (03/12/08); tonsillectomy (1963); cervical fusion posterior (1/16/2009); hardware removal neuro (1/16/2009); neck exploration (1/16/2009); abdominal hysterectomy total; lumpectomy; lumbar laminectomy diskectomy (Right, 5/10/2016); shoulder decompression arthroscopic (6/17/08); clavicle distal excision (6/17/08); shoulder arthroscopy w/ rotator cuff repair (10/9/08); njx aa&/strd tfrml epi lumbar/sacral 1 level (Right, 8/31/2016); spinal cord stimulator (N/A, 10/26/2018); thoracic laminectomy (N/A, 10/26/2018); appendectomy (2004); implant neurostim/ (N/A, 12/16/2019); irrigation & debridement neuro (1/19/2020); cath placement (1/25/2020); ercp,diagnostic (N/A, 10/26/2021); upper gi endoscopy,biopsy (N/A, 10/26/2021); upper gi endoscopy,diagnosis (N/A, 10/26/2021); peter by laparoscopy (N/A, 10/28/2021); and ercp,diagnostic (N/A,  "1/14/2022).    CURRENT MEDICATIONS  Home Medications    **Home medications have not yet been reviewed for this encounter**         ALLERGIES  Allergies   Allergen Reactions    Tape Unspecified     Blisters, paper tape is ok       PHYSICAL EXAM  VITAL SIGNS: BP (!) 146/79   Pulse 70   Temp 37 °C (98.6 °F) (Temporal)   Resp 16   Ht 1.676 m (5' 6\")   Wt 55.5 kg (122 lb 5.7 oz)   LMP 04/29/2005 (LMP Unknown)   SpO2 99%   BMI 19.75 kg/m²     Pulse ox interpretation: I interpret this pulse ox as normal.  Constitutional: Alert in no apparent distress.  HENT: No signs of trauma, Bilateral external ears normal, Nose normal.   Eyes: Pupils are equal and reactive, Conjunctiva normal, Non-icteric. No jaundice.   Neck: Normal range of motion, Supple, No stridor.    Cardiovascular: Normal peripheral perfusion  Thorax & Lungs: Unlabored respirations, equal chest expansion, no accessory muscle use  Abdomen: Non-distended  Skin:  No erythema. Small excoriations on forearms and forehead. Backs of hands show small, firm, raised, smooth, one to two mm nodules. No jaundice.   Back: Normal alignment and ROM  Extremities: No gross deformity  Musculoskeletal: Good range of motion in all major joints.   Neurologic: Alert, Normal motor function, No focal deficits noted.   Psychiatric: Affect normal, Judgment normal, Mood normal.    DIAGNOSTIC STUDIES / PROCEDURES    None.     COURSE & MEDICAL DECISION MAKING  Nursing notes, VS, PMSFHx reviewed in chart.     8:22 PM Patient seen and examined at bedside.  The lesions that the patient finds itchy and then scratches and that turned into scabs look like this hidrotic eczema.  We discussed supportive care treatment.  I do not see signs of infestation.  She does have a history of liver disease, but is not jaundiced.  She does have a primary care doctor who she follows up with closely.  I think she can be treated supportively.  We discussed the Medrol pack, oatmeal baths, appropriate " moisturization, continuing her daily allergy pill, using Benadryl at night if she has difficulty sleeping because of itching, and getting some allergy eyedrops.  Patient will be discharged with Medrol Dosepack. Discussed plan of care with patient. Patient is understanding and agreeable with plan.         The patient is referred to a primary physician for blood pressure management, diabetic screening, and for all other preventative health concerns.    DISPOSITION:  Patient will be discharged home in stable condition.    FOLLOW UP:  Jarrell Yates M.D.  [address]  [facility] [address]  [phone]    Schedule an appointment as soon as possible for a visit       OUTPATIENT MEDICATIONS:  Discharge Medication List as of 7/17/2022  8:33 PM        START taking these medications    Details   methylPREDNISolone (MEDROL DOSEPAK) 4 MG Tablet Therapy Pack Use as directed, Disp-1 Each, R-0, Normal             FINAL IMPRESSION  1. Dermatitis    2. Itchy eyes         Shannan MCKEON (Scribe), am scribing for, and in the presence of, Wilman Trujillo M.D..    Electronically signed by: Shannan Pike (Scribe), 7/17/2022    IWilman M.D. personally performed the services described in this documentation, as scribed by Shannan Pike in my presence, and it is both accurate and complete. E.     The note accurately reflects work and decisions made by me.  Wilman Trujillo M.D.  7/17/2022  9:35 PM

## 2022-08-01 ENCOUNTER — OFFICE VISIT (OUTPATIENT)
Dept: DERMATOLOGY | Facility: IMAGING CENTER | Age: 67
End: 2022-08-01
Payer: MEDICARE

## 2022-08-01 DIAGNOSIS — L85.3 XEROSIS CUTIS: ICD-10-CM

## 2022-08-01 DIAGNOSIS — L29.9 PRURITUS: ICD-10-CM

## 2022-08-01 PROCEDURE — 99213 OFFICE O/P EST LOW 20 MIN: CPT | Performed by: NURSE PRACTITIONER

## 2022-08-01 RX ORDER — TRIAMCINOLONE ACETONIDE 1 MG/G
CREAM TOPICAL
Qty: 454 G | Refills: 1 | Status: SHIPPED | OUTPATIENT
Start: 2022-08-01 | End: 2023-01-17 | Stop reason: SDUPTHER

## 2022-08-01 NOTE — PROGRESS NOTES
DERMATOLOGY NOTE  NEW VISIT       Chief complaint: Establish Care     HPI: itch ness all over body , due to bed bugs   Onset:  X 1 month   Aggravating factors: none  Alleviating factors: none  New creams/topicals: medrol dose pack ,  New medications (up to last 6 months): no  New travel: no  Other exposures: no  Treatments: no          History of skin cancer: No  History of precancers/actinic keratoses: No  History of biopsies:Yes, Details: lower leg Bx done, results benign   History of blistering/severe sunburns:No  Family history of skin cancer:No  Family history of atypical moles:No      Allergies   Allergen Reactions   • Tape Unspecified     Blisters, paper tape is ok        MEDICATIONS:  Medications relevant to specialty reviewed.     REVIEW OF SYSTEMS:   Positive for skin (see HPI)  Negative for fevers and chills       EXAM:  LMP 04/29/2005 (LMP Unknown)   Constitutional: Well-developed, well-nourished, and in no distress.     A focused skin exam was performed including the affected areas of the back, bilateral upper extremities and bilateral lower extremities. Notable findings on exam today listed below and/or in assessment/plan.     numerous erythematous papule in various stages of healing, some lesions with excoriation in all areas assessed--no evidence of localized infection    IMPRESSION / PLAN:    1. Pruritus, likely related to insect assault and dry skin  Discussed course/nature of disease  Rx below  Encouraged use of emollients after shower  Advised use of second generation antihistamine, Allegra 2 pills at bedtime  Dry skin handouts given  Follow up in 4 weeks    - triamcinolone acetonide (KENALOG) 0.1 % Cream; AAA twice a day for 3 weeks, then can use 2 weeks at a time  Dispense: 454 g; Refill: 1    2. Xerosis cutis  As above      Discussed risks, benefits, alternative treatments as well as common side effects associated with prescribed treatment, Patient verbalized understanding and agrees with plan  regarding the above        Please note that this dictation was created using voice recognition software. I have made every reasonable attempt to correct obvious errors, but I expect that there are errors of grammar and possibly content that I did not discover before finalizing the note.      Return to clinic in: Return in about 4 weeks (around 8/29/2022) for rash follow up. and as needed for any new or changing skin lesions.

## 2022-08-02 DIAGNOSIS — L29.9 PRURITUS: ICD-10-CM

## 2022-08-02 RX ORDER — TRIAMCINOLONE ACETONIDE 5 MG/G
CREAM TOPICAL
Qty: 454 G | Refills: 1 | Status: SHIPPED | OUTPATIENT
Start: 2022-08-02 | End: 2022-08-19

## 2022-08-08 ENCOUNTER — OFFICE VISIT (OUTPATIENT)
Dept: CARDIOLOGY | Facility: MEDICAL CENTER | Age: 67
End: 2022-08-08
Payer: MEDICARE

## 2022-08-08 VITALS
RESPIRATION RATE: 16 BRPM | BODY MASS INDEX: 18.96 KG/M2 | OXYGEN SATURATION: 98 % | HEIGHT: 66 IN | HEART RATE: 76 BPM | SYSTOLIC BLOOD PRESSURE: 130 MMHG | WEIGHT: 118 LBS | DIASTOLIC BLOOD PRESSURE: 70 MMHG

## 2022-08-08 DIAGNOSIS — R09.89 BILATERAL CAROTID BRUITS: ICD-10-CM

## 2022-08-08 DIAGNOSIS — M79.604 PAIN IN BOTH LOWER EXTREMITIES: ICD-10-CM

## 2022-08-08 DIAGNOSIS — Z72.0 TOBACCO USE: ICD-10-CM

## 2022-08-08 DIAGNOSIS — E78.5 DYSLIPIDEMIA: ICD-10-CM

## 2022-08-08 DIAGNOSIS — I25.10 CORONARY ARTERY DISEASE DUE TO LIPID RICH PLAQUE: ICD-10-CM

## 2022-08-08 DIAGNOSIS — I10 HYPERTENSION, ESSENTIAL: ICD-10-CM

## 2022-08-08 DIAGNOSIS — M79.605 PAIN IN BOTH LOWER EXTREMITIES: ICD-10-CM

## 2022-08-08 DIAGNOSIS — I25.83 CORONARY ARTERY DISEASE DUE TO LIPID RICH PLAQUE: ICD-10-CM

## 2022-08-08 PROCEDURE — 99406 BEHAV CHNG SMOKING 3-10 MIN: CPT | Performed by: INTERNAL MEDICINE

## 2022-08-08 PROCEDURE — 99214 OFFICE O/P EST MOD 30 MIN: CPT | Mod: 25 | Performed by: INTERNAL MEDICINE

## 2022-08-08 RX ORDER — FLASH GLUCOSE SENSOR
KIT MISCELLANEOUS
COMMUNITY
Start: 2022-08-02 | End: 2023-02-22 | Stop reason: SDUPTHER

## 2022-08-08 RX ORDER — LEVOTHYROXINE SODIUM 125 MCG
TABLET ORAL
COMMUNITY
Start: 2022-07-19 | End: 2022-08-08

## 2022-08-08 RX ORDER — LIOTHYRONINE SODIUM 5 UG/1
5 TABLET ORAL
COMMUNITY
Start: 2022-07-19 | End: 2023-03-14

## 2022-08-08 RX ORDER — AMLODIPINE BESYLATE 10 MG/1
TABLET ORAL
COMMUNITY
Start: 2022-06-21 | End: 2022-08-08

## 2022-08-08 RX ORDER — POTASSIUM CHLORIDE 750 MG/1
10 CAPSULE, EXTENDED RELEASE ORAL
COMMUNITY
Start: 2022-07-21 | End: 2023-08-28

## 2022-08-08 RX ORDER — PRAVASTATIN SODIUM 40 MG
40 TABLET ORAL NIGHTLY
Qty: 90 TABLET | Refills: 3 | Status: SHIPPED | OUTPATIENT
Start: 2022-08-08 | End: 2023-06-12

## 2022-08-08 ASSESSMENT — FIBROSIS 4 INDEX: FIB4 SCORE: 2.15

## 2022-08-08 NOTE — PROGRESS NOTES
"CARDIOLOGY OUTPATIENT FOLLOWUP    PCP: Jarrell Yates M.D.    1. Coronary artery disease due to lipid rich plaque    2. Tobacco use    3. Hypertension, essential    4. Dyslipidemia    5. Bilateral carotid bruits    6. Pain in both lower extremities        Anu Manuel is free of recurrent angina but not taking statin medication which I suspect was due to abnormal LFTs.  I suggest starting pravastatin 40 mg nightly and will update a metabolic and lipid profile in 3 months.  Additionally I counseled her for 5 minutes about nicotine cessation, recommending she keep the cigarettes outside.  There is a faint bruit in the carotids bilaterally and I asked that she obtain a carotid duplex and we will also perform a lower extremity DAYANARA.    Follow up: 1 year    Chief Complaint   Patient presents with   • Coronary Artery Disease     F/V Dx: Coronary artery disease due to lipid rich plaque-MINDY x2 to the LAD 8/10/2020. Fully revascularized.       History: Anu Manuel is a 67 y.o. female with history of hypertension, dyslipidemia, diabetes and tobacco abuse presenting for follow-up of coronary artery disease with PCI in 2020 for unstable angina.  LVEF normal.    She continues to smoke about 1/2 pack/day.  She does get pain in the legs, mostly at night.  She has not experienced any recurrent angina but has had a rare fluttering sensation in the chest.      ROS:   10 point review systems is otherwise negative except as per the HPI    PE:  /70 (BP Location: Left arm, Patient Position: Sitting, BP Cuff Size: Adult)   Pulse 76   Resp 16   Ht 1.676 m (5' 6\")   Wt 53.5 kg (118 lb)   LMP 04/29/2005 (LMP Unknown)   SpO2 98%   BMI 19.05 kg/m²   Gen: no acute distress  HEENT: Symmetric face. Anicteric sclerae. Moist mucus membranes  NECK: No JVD. No lymphadenopathy  CARDIAC: Regular, Normal S1, S2, No murmur  VASCULATURE: bilateral bruit  RESP: Clear to auscultation bilaterally  ABD: Soft, non-tender, " "non-distended  EXT: No edema, no clubbing or cyanosis  SKIN: Warm and dry  NEURO: No gross deficits  PSYCH: Appropriate affect, participates in conversation    The ASCVD Risk score ([place] DC Jr, et al., 2013) failed to calculate.    Past Medical History:   Diagnosis Date   • Adverse effect of anesthesia     in 2008 \"throat closes up\"\"anxiety\" ?laryngospasm, kept in ICU. Pt states no problems currently 2018.    • Anesthesia     in 2008 \"throat closes up\"\"anxiety\" ?laryngospasm, kept in ICU. Pt states no problems currently 2018.    • Arthritis     right shoulder, hands   • Cigarette smoker quit 2013   • Dental disorder     missing teeth    • depression 8/30/2016    denies depression, states has anxiety and panic attacks   • Diabetes mellitus type 1 (HCC) 1989    insulin   • Encounter for long-term (current) use of insulin (HCC) 9/25/2013   • Heart burn    • High cholesterol    • Hypertension    • Hypothyroidism, postsurgical 1970   • Indigestion    • Infectious disease      had hepatitis C, tested neg.   • Joint replacement     cervical   • Liver failure (HCC)    • Pain     \"fibromyalgia\";lower back, right leg   • Polyneuropathy in diabetes(357.2) 6/10/2015   • Status post appendectomy    • Type I (juvenile type) diabetes mellitus with neurological manifestations, uncontrolled(250.63) 6/10/2015     Allergies   Allergen Reactions   • Tape Unspecified     Blisters, paper tape is ok     Outpatient Encounter Medications as of 8/8/2022   Medication Sig Dispense Refill   • liothyronine (CYTOMEL) 5 MCG Tab Take 5 mcg by mouth every day.     • potassium chloride (MICRO-K) 10 MEQ capsule Take 10 mEq by mouth every day.     • Continuous Blood Gluc Sensor (FREESTYLE PARISA 14 DAY SENSOR) Misc      • pravastatin (PRAVACHOL) 40 MG tablet Take 1 Tablet by mouth every evening. 90 Tablet 3   • triamcinolone acetonide (KENALOG) 0.5 % Cream AAA twice a day for 3 weeks, then stop for 2 weeks, then can use every 2 weeks 454 g 1   • " triamcinolone acetonide (KENALOG) 0.1 % Cream AAA twice a day for 3 weeks, then can use 2 weeks at a time 454 g 1   • hydroCHLOROthiazide (HYDRODIURIL) 25 MG Tab Take 25 mg by mouth every day.     • metoprolol SR (TOPROL XL) 50 MG TABLET SR 24 HR 50 mg every morning.     • metoprolol SR (TOPROL XL) 100 MG TABLET SR 24  mg every morning.     • ezetimibe (ZETIA) 10 MG Tab 10 mg every evening.     • vitamin D3 (CHOLECALCIFEROL) 5000 Unit (125 mcg) Tab Take 5,000 Units by mouth 2 times a day.     • insulin aspart (NOVOLOG FLEXPEN) 100 UNIT/ML injection PEN Inject 1-5 Units under the skin in the morning, at noon, and at bedtime. 1 units for every 5 g of carbs   AND  Sliding Scale   150 - 200 = 1 units  201 - 250 = 2 units  251 - 300 = 3 units  301 - 350 = 4 units  351 - 400 = 5 units     • traZODone (DESYREL) 100 MG Tab Take 100 mg by mouth at bedtime.     • TRESIBA FLEXTOUCH 100 UNIT/ML Solution Pen-injector Inject 23 Units under the skin every evening.     • SYNTHROID 200 MCG Tab Take 250 mcg by mouth every morning. 200mcg tablet + 25mcg tablet for a total dose of 225mcg     • [DISCONTINUED] amLODIPine (NORVASC) 10 MG Tab  (Patient not taking: Reported on 8/8/2022)     • [DISCONTINUED] SYNTHROID 125 MCG Tab TAKE 2 TABLET(S) ORAL EVERY DAY (Patient not taking: Reported on 8/8/2022)     • [DISCONTINUED] methylPREDNISolone (MEDROL DOSEPAK) 4 MG Tablet Therapy Pack Use as directed (Patient not taking: Reported on 8/8/2022) 1 Each 0     No facility-administered encounter medications on file as of 8/8/2022.     Social History     Socioeconomic History   • Marital status:      Spouse name: Not on file   • Number of children: Not on file   • Years of education: Not on file   • Highest education level: Not on file   Occupational History   • Not on file   Tobacco Use   • Smoking status: Current Every Day Smoker     Packs/day: 0.50     Years: 30.00     Pack years: 15.00     Types: Cigarettes   • Smokeless tobacco:  Never Used   Vaping Use   • Vaping Use: Never used   Substance and Sexual Activity   • Alcohol use: Not Currently   • Drug use: Yes     Comment: Marijuana - Rarely   • Sexual activity: Not on file   Other Topics Concern   • Not on file   Social History Narrative   • Not on file     Social Determinants of Health     Financial Resource Strain: Low Risk    • Difficulty of Paying Living Expenses: Not very hard   Food Insecurity: No Food Insecurity   • Worried About Running Out of Food in the Last Year: Never true   • Ran Out of Food in the Last Year: Never true   Transportation Needs: No Transportation Needs   • Lack of Transportation (Medical): No   • Lack of Transportation (Non-Medical): No   Physical Activity: Not on file   Stress: Not on file   Social Connections: Not on file   Intimate Partner Violence: Not on file   Housing Stability: Not on file       Studies  Lab Results   Component Value Date/Time    CHOLSTRLTOT 276 (H) 02/16/2022 11:58 AM     (H) 02/16/2022 11:58 AM     02/16/2022 11:58 AM    TRIGLYCERIDE 83 02/16/2022 11:58 AM       Lab Results   Component Value Date/Time    SODIUM 140 07/13/2022 03:17 PM    POTASSIUM 3.1 (L) 07/13/2022 03:17 PM    CHLORIDE 101 07/13/2022 03:17 PM    CO2 29 07/13/2022 03:17 PM    GLUCOSE 105 (H) 07/13/2022 03:17 PM    BUN 23 (H) 07/13/2022 03:17 PM    CREATININE 1.16 07/13/2022 03:17 PM    CREATININE 1.0 01/08/2009 04:31 PM     Lab Results   Component Value Date/Time    ALKPHOSPHAT 1177 (H) 07/13/2022 03:17 PM    ASTSGOT 108 (H) 07/13/2022 03:17 PM    ALTSGPT 104 (H) 07/13/2022 03:17 PM    TBILIRUBIN 1.2 07/13/2022 03:17 PM        For this encounter I reviewed the following medical records BMP, Lipid profile and CBC

## 2022-08-19 ENCOUNTER — OFFICE VISIT (OUTPATIENT)
Dept: ENDOCRINOLOGY | Facility: MEDICAL CENTER | Age: 67
End: 2022-08-19
Attending: INTERNAL MEDICINE
Payer: MEDICARE

## 2022-08-19 VITALS
HEIGHT: 66 IN | HEART RATE: 70 BPM | OXYGEN SATURATION: 96 % | DIASTOLIC BLOOD PRESSURE: 66 MMHG | WEIGHT: 124 LBS | SYSTOLIC BLOOD PRESSURE: 108 MMHG | BODY MASS INDEX: 19.93 KG/M2

## 2022-08-19 DIAGNOSIS — E10.9 TYPE 1 DIABETES MELLITUS ON INSULIN THERAPY (HCC): ICD-10-CM

## 2022-08-19 DIAGNOSIS — E55.9 VITAMIN D DEFICIENCY: ICD-10-CM

## 2022-08-19 DIAGNOSIS — E78.5 DYSLIPIDEMIA: ICD-10-CM

## 2022-08-19 DIAGNOSIS — Z79.4 LONG-TERM INSULIN USE (HCC): ICD-10-CM

## 2022-08-19 DIAGNOSIS — E89.0 HYPOTHYROIDISM, POSTSURGICAL: ICD-10-CM

## 2022-08-19 LAB
HBA1C MFR BLD: 8.7 % (ref 0–5.6)
INT CON NEG: ABNORMAL
INT CON POS: ABNORMAL

## 2022-08-19 PROCEDURE — 99205 OFFICE O/P NEW HI 60 MIN: CPT | Performed by: INTERNAL MEDICINE

## 2022-08-19 PROCEDURE — 99213 OFFICE O/P EST LOW 20 MIN: CPT | Performed by: INTERNAL MEDICINE

## 2022-08-19 PROCEDURE — 83036 HEMOGLOBIN GLYCOSYLATED A1C: CPT | Performed by: INTERNAL MEDICINE

## 2022-08-19 RX ORDER — MULTIVIT-MIN/IRON/FOLIC ACID/K 18-600-40
2000 CAPSULE ORAL 2 TIMES DAILY
COMMUNITY
End: 2023-03-14

## 2022-08-19 RX ORDER — FLASH GLUCOSE SENSOR
1 KIT MISCELLANEOUS
Qty: 1 EACH | Refills: 1 | Status: SHIPPED | OUTPATIENT
Start: 2022-08-19 | End: 2023-04-04

## 2022-08-19 ASSESSMENT — PATIENT HEALTH QUESTIONNAIRE - PHQ9: CLINICAL INTERPRETATION OF PHQ2 SCORE: 0

## 2022-08-19 ASSESSMENT — FIBROSIS 4 INDEX: FIB4 SCORE: 2.15

## 2022-08-19 NOTE — PROGRESS NOTES
"Chief Complaint:  Consult requested by Jarrell Yates M.D. for initial evaluation of Type 1 Diabetes Mellitus    HPI:   Anu Manuel is a 67 y.o. female with Type 1 Diabetes Mellitus diagnosed in age of 34.  She denies hospitalizations for DKA in the past.      Her a1c is 8.7% on 8/19/2022  Her a1c was 8.0% on 2/16/2022    She admits to noncompliance and she has been skipping her meal time insulin    She is on Tresiba 24u daily  Novolog 3-5u with each meal  She doesn't really carb count and she guesses her insulin dose    She has primary hypothyroidism and is Synthroid 250 (125 x 2)  mcg   Cytomel 5mcg daily    She admits to non-adherence with thyroid hormone therapy and also taking thyroid hormone with food  TSH was 17 on 7/13/2022      She has hyperlipidemia and is on pravastatin          She monitors blood glucose  4 times per day.   She is using a rylie CGM but we could not download    She reports hypoglycemic episodes occurring 2 times per week and are mild  She  reports  hypoglycemic unawareness. She reports episodes of severe hypoglycemia requiring third party assistance.  She  is not wearing a medical alert bracelet or necklace.  She does not a glucagon emergency kit.    She reports attending diabetes education classes.  Diet: \"unhealthy\" diet in general.    Diabetes Complications   She reports history of retinopathy.  She denies laser eye surgery. Last eye exam:  Dr. Ricketts  on 8/2022  She reports history of peripheral sensory neuropathy.  She reports numbness, tingling in both feet.  She denies history of foot sores.   She denies history of kidney damage.  She is not on ACE inhibitor or ARB.   She reports history of coronary artery disease.  She  denies history of stroke and denies TIA.  She denies history of PAD.  She reports history of hyperlipidemia.      ROS:     CONS:     No fever, no chills, no weight loss, reports fatigue   EYES:      No diplopia, no blurry vision, no redness of eyes, no " swelling of eyelids   ENT:    No hearing loss, No ear pain, No sore throat, no dysphagia, no neck swelling   CV:     No chest pain, no palpitations, no claudication, no orthopnea, no PND   PULM:    No SOB, no cough, no hemoptysis, no wheezing    GI:   No nausea, no vomiting, no diarrhea, no constipation, no bloody stools   :  Passing urine well, no dysuria, no hematuria   ENDO:   No polyuria, no polydipsia, no heat intolerance, no cold intolerance   NEURO: No headaches, no dizziness, no convulsions, no tremors   MUSC:  No joint swellings, no arthralgias, no myalgias, no weakness   SKIN:   No rash, no ulcers, reports dry skin   PSYCH:   No depression, no anxiety, no difficulty sleeping       Past Medical History:  Patient Active Problem List    Diagnosis Date Noted    Long-term insulin use (HCC) 08/19/2022    Liver failure without hepatic coma (HCC) 01/14/2022    Constipation 11/26/2021    Elevated LFTs 11/24/2021    Fluid collection at surgical site 11/10/2021    Anasarca 10/29/2021    Cholecystitis 10/25/2021    Bilious vomiting with nausea 10/21/2021    Hepatitis 10/21/2021    Jaundice 10/20/2021    Current moderate episode of major depressive disorder (HCC) 10/17/2021    Generalized abdominal pain 10/08/2021    Epigastric pain 09/28/2021    Elevated liver enzymes 09/23/2021    Anemia 09/23/2021    Elevated troponin 09/23/2021    Pain in the chest 08/17/2020    CAD S/P percutaneous coronary angioplasty 08/11/2020    Hypothyroidism in adult 08/10/2020    Dyslipidemia 06/23/2020    GERD (gastroesophageal reflux disease) 06/23/2020    Lung nodule 06/23/2020    Hemoptysis 06/23/2020    Anxiety 01/25/2020    Nausea & vomiting 01/25/2020    Hypertension 01/24/2020    Hypoglycemia 01/22/2020    RHEA (acute kidney injury) (HCC) 01/21/2020    Post-operative wound abscess 01/18/2020    Tobacco abuse 01/18/2020    Cellulitis 03/15/2018    Left hip pain 03/15/2018    Acute left lumbar radiculopathy 08/31/2016    Lumbar  spinal stenosis 05/10/2016    Type 1 diabetes mellitus with neurological manifestations, uncontrolled (HCC) 06/10/2015    Diabetic polyneuropathy (CMS-HCC) 06/10/2015    Vertigo 04/08/2015    Type 1 diabetes mellitus on insulin therapy (HCC) 09/25/2013    Advanced care planning/counseling discussion 09/25/2013    Accidental drug overdose 08/27/2012    Vitamin d deficiency 03/14/2012    Hypothyroidism, postsurgical     Cigarette smoker        Past Surgical History:  Past Surgical History:   Procedure Laterality Date    HI ERCP,DIAGNOSTIC N/A 1/14/2022    Procedure: ERCP, DIAGNOSTIC - WITH SIGMOID COLON BIOPSY AND STENT REMOVAL;  Surgeon: Cr Haro M.D.;  Location: [facility];  Service: Gastroenterology    THADDEUS BY LAPAROSCOPY N/A 10/28/2021    Procedure: CHOLECYSTECTOMY, LAPAROSCOPIC;  Surgeon: Tello Trammell M.D.;  Location: [facility];  Service: General    HI ERCP,DIAGNOSTIC N/A 10/26/2021    Procedure: ERCP (ENDOSCOPIC RETROGRADE CHOLANGIOPANCREATOGRAPHY);  Surgeon: Cr Haro M.D.;  Location: SURGERY SAME DAY [facility];  Service: Gastroenterology    HI UPPER GI ENDOSCOPY,BIOPSY N/A 10/26/2021    Procedure: GASTROSCOPY, WITH BIOPSY;  Surgeon: Cr Haro M.D.;  Location: SURGERY SAME DAY [facility];  Service: Gastroenterology    HI UPPER GI ENDOSCOPY,DIAGNOSIS N/A 10/26/2021    Procedure: GASTROSCOPY;  Surgeon: Cr Haro M.D.;  Location: SURGERY SAME DAY [facility];  Service: Gastroenterology    CATH PLACEMENT  1/25/2020    Procedure: INSERTION, CATHETER PERM;  Surgeon: Rola Mendoza M.D.;  Location: [facility];  Service: General    IRRIGATION & DEBRIDEMENT NEURO  1/19/2020    Procedure: IRRIGATION AND DEBRIDEMENT, WOUND THORACIC AND LUMBAR;  Surgeon: Ryan Roman M.D.;  Location: [facility];  Service: Neurosurgery    PB IMPLANT NEUROSTIM/ N/A 12/16/2019    Procedure: EXPLORATION AT THORACIC 8 - 9, REPLACEMENT OF  NEUROSTIMULATOR LEAD;  Surgeon:  Ryan Roman M.D.;  Location: SURGERY [facility];  Service: Neurosurgery    SPINAL CORD STIMULATOR N/A 10/26/2018    Procedure: SPINAL CORD STIMULATOR;  Surgeon: Ryan Roman M.D.;  Location: SURGERY [facility];  Service: Neurosurgery    THORACIC LAMINECTOMY N/A 10/26/2018    Procedure: THORACIC LAMINECTOMY - FOR;  Surgeon: Ryan Roman M.D.;  Location: SURGERY [facility];  Service: Neurosurgery    HI NJX AA&/STRD TFRML EPI LUMBAR/SACRAL 1 LEVEL Right 8/31/2016    Procedure: INJ-FORAMEN EPI LUM/SAC SNGL L4-5;  Surgeon: Sukhi Godfrey M.D.;  Location: SURGERY [facility];  Service: Pain Management    LUMBAR LAMINECTOMY DISKECTOMY Right 5/10/2016    Procedure: LUMBAR L4-5 HEMILAMINECTOMY DISKECTOMY ;  Surgeon: Arnold Keyes M.D.;  Location: SURGERY [facility];  Service:     CERVICAL FUSION POSTERIOR  1/16/2009    Performed by TARA CONTRERAS at [facility]    HARDWARE REMOVAL NEURO  1/16/2009    Performed by TARA CONTRERAS at [facility]    NECK EXPLORATION  1/16/2009    Performed by TARA CONTRERAS at [facility]    SHOULDER ARTHROSCOPY W/ ROTATOR CUFF REPAIR  10/9/08    Performed by SHERLY CASTANEDA at [facility]    SHOULDER DECOMPRESSION ARTHROSCOPIC  6/17/08    Performed by SHERLY CASTANEDA at [facility]    CLAVICLE DISTAL EXCISION  6/17/08    Performed by SHERLY CASTANEDA at [facility] ORS    CERVICAL DISK AND FUSION ANTERIOR  03/12/08    HYSTERECTOMY, VAGINAL  2006    APPENDECTOMY  2004    THYROID LOBECTOMY  1973    TONSILLECTOMY  1963    ABDOMINAL HYSTERECTOMY TOTAL      LUMPECTOMY  1976, 2005    Breast     LUMPECTOMY          Allergies:  Tape     Current Medications:    Current Outpatient Medications:     Cholecalciferol (VITAMIN D) 50 MCG (2000 UT) Cap, Take 2,000 Units by mouth 2 times a day., Disp: , Rfl:     Cyanocobalamin (VITAMIN B 12 PO), Take 2,500 mcg by mouth every day., Disp: , Rfl:      liothyronine (CYTOMEL) 5 MCG Tab, Take 5 mcg by mouth every day., Disp: , Rfl:     potassium chloride (MICRO-K) 10 MEQ capsule, Take 10 mEq by mouth every day., Disp: , Rfl:     Continuous Blood Gluc Sensor (FREESTYLE PARISA 14 DAY SENSOR) Misc, , Disp: , Rfl:     pravastatin (PRAVACHOL) 40 MG tablet, Take 1 Tablet by mouth every evening., Disp: 90 Tablet, Rfl: 3    triamcinolone acetonide (KENALOG) 0.1 % Cream, AAA twice a day for 3 weeks, then can use 2 weeks at a time, Disp: 454 g, Rfl: 1    hydroCHLOROthiazide (HYDRODIURIL) 25 MG Tab, Take 25 mg by mouth every day., Disp: , Rfl:     metoprolol SR (TOPROL XL) 50 MG TABLET SR 24 HR, 50 mg every morning., Disp: , Rfl:     metoprolol SR (TOPROL XL) 100 MG TABLET SR 24 HR, 100 mg every morning., Disp: , Rfl:     ezetimibe (ZETIA) 10 MG Tab, 10 mg every evening., Disp: , Rfl:     insulin aspart (NOVOLOG FLEXPEN) 100 UNIT/ML injection PEN, Inject 1-5 Units under the skin in the morning, at noon, and at bedtime. 1 units for every 5 g of carbs  AND Sliding Scale  150 - 200 = 1 units 201 - 250 = 2 units 251 - 300 = 3 units 301 - 350 = 4 units 351 - 400 = 5 units, Disp: , Rfl:     traZODone (DESYREL) 100 MG Tab, Take 100 mg by mouth at bedtime., Disp: , Rfl:     TRESIBA FLEXTOUCH 100 UNIT/ML Solution Pen-injector, Inject 24 Units under the skin every evening., Disp: , Rfl:     SYNTHROID 200 MCG Tab, Take 250 mcg by mouth every morning. 200mcg tablet + 25mcg tablet for a total dose of 225mcg, Disp: , Rfl:     vitamin D3 (CHOLECALCIFEROL) 5000 Unit (125 mcg) Tab, Take 5,000 Units by mouth 2 times a day., Disp: , Rfl:     Social History:  Social History     Socioeconomic History    Marital status:      Spouse name: Not on file    Number of children: Not on file    Years of education: Not on file    Highest education level: Not on file   Occupational History    Not on file   Tobacco Use    Smoking status: Every Day     Packs/day: 0.50     Years: 30.00     Pack years:  "15.00     Types: Cigarettes    Smokeless tobacco: Never   Vaping Use    Vaping Use: Never used   Substance and Sexual Activity    Alcohol use: Not Currently    Drug use: Yes     Comment: Marijuana - Rarely    Sexual activity: Not on file   Other Topics Concern    Not on file   Social History Narrative    Not on file     Social Determinants of Health     Financial Resource Strain: Low Risk     Difficulty of Paying Living Expenses: Not very hard   Food Insecurity: No Food Insecurity    Worried About Running Out of Food in the Last Year: Never true    Ran Out of Food in the Last Year: Never true   Transportation Needs: No Transportation Needs    Lack of Transportation (Medical): No    Lack of Transportation (Non-Medical): No   Physical Activity: Not on file   Stress: Not on file   Social Connections: Not on file   Intimate Partner Violence: Not on file   Housing Stability: Not on file        Family History:   Family History   Problem Relation Age of Onset    Hypertension Mother     Cancer Father          PHYSICAL EXAM:   Vital signs: /66   Pulse 70   Ht 1.676 m (5' 6\")   Wt 56.2 kg (124 lb)   LMP 04/29/2005 (LMP Unknown)   SpO2 96%   BMI 20.01 kg/m²   GENERAL: Well-developed, well-nourished  in no apparent distress.   EYE: No ocular and eyelid asymmetry, Anicteric sclerae,  PERRL, No exophthalmos or lidlag  HENT: Hearing grossly intact, Normocephalic, atraumatic. Pink, moist mucous membranes, No exudate  NECK: Supple. Trachea midline. thyroid is non palpable   CARDIOVASCULAR: Regular rate and rhythm. No murmurs, rubs, or gallops.   LUNGS: Clear to auscultation bilaterally   ABDOMEN: Soft, nontender with positive bowel sounds.   EXTREMITIES: No clubbing, cyanosis, or edema.   NEUROLOGICAL: Cranial nerves II-XII are grossly intact   Symmetric reflexes at the patella no proximal muscle weakness, No visible tremor with both outstretched hands  LYMPH: No cervical, supraclavicular,  adenopathy palpated.   SKIN: " No rashes, lesions. Turgor is normal.  FOOT: Normal sensation to monofilament testing, normal pulses, no ulcers.      Labs:  Lab Results   Component Value Date/Time    HBA1C 8.7 (A) 08/19/2022 1347    AVGLUC 183 02/16/2022 1158       Lab Results   Component Value Date/Time    WBC 7.7 05/05/2022 06:00 PM    RBC 3.65 (L) 05/05/2022 06:00 PM    HEMOGLOBIN 11.7 (L) 05/05/2022 06:00 PM    MCV 98.1 (H) 05/05/2022 06:00 PM    MCH 32.1 05/05/2022 06:00 PM    MCHC 32.7 (L) 05/05/2022 06:00 PM    RDW 48.8 05/05/2022 06:00 PM    MPV 9.9 05/05/2022 06:00 PM       Lab Results   Component Value Date/Time    SODIUM 140 07/13/2022 03:17 PM    POTASSIUM 3.1 (L) 07/13/2022 03:17 PM    CHLORIDE 101 07/13/2022 03:17 PM    CO2 29 07/13/2022 03:17 PM    ANION 10.0 07/13/2022 03:17 PM    GLUCOSE 105 (H) 07/13/2022 03:17 PM    BUN 23 (H) 07/13/2022 03:17 PM    CREATININE 1.16 07/13/2022 03:17 PM    CREATININE 1.0 01/08/2009 04:31 PM    CALCIUM 9.1 07/13/2022 03:17 PM    ASTSGOT 108 (H) 07/13/2022 03:17 PM    ALTSGPT 104 (H) 07/13/2022 03:17 PM    TBILIRUBIN 1.2 07/13/2022 03:17 PM    ALBUMIN 3.7 07/13/2022 03:17 PM    TOTPROTEIN 7.3 07/13/2022 03:17 PM    GLOBULIN 3.6 (H) 07/13/2022 03:17 PM    AGRATIO 1.0 07/13/2022 03:17 PM       Lab Results   Component Value Date/Time    CHOLSTRLTOT 276 (H) 02/16/2022 1158    TRIGLYCERIDE 83 02/16/2022 1158     02/16/2022 1158     (H) 02/16/2022 1158       Lab Results   Component Value Date/Time    MALBCRT see below 07/13/2022 03:17 PM    MICROALBUR >440.0 07/13/2022 03:17 PM        Lab Results   Component Value Date/Time    TSHULTRASEN 17.100 (H) 07/13/2022 1517     No results found for: FREEDIR  Lab Results   Component Value Date/Time    FREET3 1.53 (L) 07/13/2022 1517     No results found for: THYSTIMIG        ASSESSMENT/PLAN:     1. Type 1 diabetes mellitus on insulin therapy (HCC)  Uncontrolled secondary to hyperglycemia  Patient admits to skipping her mealtime insulin  Patient admits  that she does not carb count and does not use a carb ratio  She is not a good candidate for an insulin pump  We did not download her rylie sensor today because it is not connected to the system  We sent her an invite and I also encouraged her to try to use the rylie reader  I sent a new prescription for a rylie reader  Because of lack of data I am unable to make any changes to her insulin regimen at this time  I want her to do a better job with blood sugar monitoring and testing 4 times a day  I want her to log her sugars  I also advised her not to skip her mealtime insulin  I want her to follow-up with our diabetes nurse in 3 months      2. Hypothyroidism, postsurgical  Uncontrolled  Explained to patient that her thyroid hormone dose is pretty robust considering her weight and weight-based calculated dose  I believe that her thyroid hormone levels are uncontrolled because of noncompliance and also because of improper administration of thyroid hormone such as taking it with food  Discussed how to properly take Synthroid  No changes with dose  Continue Synthroid 250 MCG daily  Continue Cytomel 5 MCG daily  Follow-up in 3 months with repeat of TSH and free T4 and free T3 levels    3. Dyslipidemia  Stable  Continue pravastatin  Repeat fasting lipids with future labs    4. Vitamin D deficiency  Stable  Will check calcium vitamin D with future labs    5. Long-term insulin use (HCC)  Patient is on long-term multiple daily insulin injections because of type 1 diabetes      Return in about 3 months (around 11/19/2022).       This patient during there office visit was started on new medication.  Side effects of new medications were discussed with the patient today in the office. The patient was supplied paperwork on this new medication.    Total time spent on date of service was over 60 minutes which included obtaining a detailed history and physical exam, ordering labs, cording care and scheduling future follow-up    Thank  you kindly for allowing me to participate in the diabetes care plan for this patient.    Josue Paz MD, [facility], [facility]  08/19/22    CC:   Jarrell Yates M.D.

## 2022-08-19 NOTE — PROGRESS NOTES
RN-CDE Note    Subjective:   Endocrinology Clinic Progress Note  PCP: Jarrell Yates M.D.    HPI:  Anu Manuel is a 67 y.o. old patient who is seen today for review of Type 1 Diabetes.  Recent changes in health: She states she has had a lot of autoimmune problems.  She has been having high liver enzymes, jaundice, itching, and rash.  She has had a lot of stress in the last 1.5 years with the lost of her , brother, and mother all in the same week.  DM:   Last A1c:   Lab Results   Component Value Date/Time    HBA1C 8.0 (H) 02/16/2022 11:58 AM      Previous A1c was 8.0 on 2/16/22  A1C GOAL: < 7    Diabetes Medications:   Tresiba       Exercise: moving into house.  Having hip and lower back pain that makes it hard to walk.  Diet: Pancakes and eggs for breakfast.  Lunch varies.  Dinner is protein, vegetables, and carbohydrates.  Patient's body mass index is 20.01 kg/m². Exercise and nutrition counseling were performed at this visit.    Glucose monitoring frequency: Using John    Hypoglycemic episodes: yes - during the night or early morning  Last Retinal Exam:  20/20 Vision 1 month ago and will have cataract surgery by Dr. Ricketts next week.  Daily Foot Exam: Yes   Foot Exam:  Monofilament: done  Monofilament testing with a 10 gram force: sensation: intact bilaterally  Visual Inspection: Feet without maceration, ulcers, or fissures.  Pedal pulses: intact bilaterally   Lab Results   Component Value Date/Time    MALBCRT see below 07/13/2022 03:17 PM    MICROALBUR >440.0 07/13/2022 03:17 PM        ACR Albumin/Creatinine Ratio goal <30     HTN:   Blood pressure goal <140/<80 .   Currently Rx ACE/ARB: No     Dyslipidemia:    Lab Results   Component Value Date/Time    CHOLSTRLTOT 276 (H) 02/16/2022 11:58 AM     (H) 02/16/2022 11:58 AM     02/16/2022 11:58 AM    TRIGLYCERIDE 83 02/16/2022 11:58 AM         Currently Rx Statin: Yes     She  reports that she has been smoking cigarettes. She has a  15.00 pack-year smoking history. She has never used smokeless tobacco.      Plan:     Discussed and educated on:   - All medications, side effects and compliance (discussed carefully)  - Annual eye examinations at Ophthalmology  - Home glucose monitoring emphasized  - Weight control and daily exercise    Recommended medication changes: Adjust insulin as needed.  Consider ACE or ARB.

## 2022-08-22 ENCOUNTER — TELEPHONE (OUTPATIENT)
Dept: ENDOCRINOLOGY | Facility: MEDICAL CENTER | Age: 67
End: 2022-08-22
Payer: MEDICARE

## 2022-08-22 NOTE — TELEPHONE ENCOUNTER
Patient had an appointment with Dr Paz and he had place a John on him but patient states he is having a hard time catching he states a message pops up saying not able to pair/connect. He also mentioned that insurance will not cover. Please advice.

## 2022-08-29 ENCOUNTER — OFFICE VISIT (OUTPATIENT)
Dept: DERMATOLOGY | Facility: IMAGING CENTER | Age: 67
End: 2022-08-29
Payer: MEDICARE

## 2022-08-29 ENCOUNTER — HOSPITAL ENCOUNTER (OUTPATIENT)
Dept: LAB | Facility: MEDICAL CENTER | Age: 67
End: 2022-08-29
Attending: INTERNAL MEDICINE
Payer: MEDICARE

## 2022-08-29 DIAGNOSIS — L29.9 PRURITUS: ICD-10-CM

## 2022-08-29 DIAGNOSIS — L73.9 FOLLICULITIS: ICD-10-CM

## 2022-08-29 LAB
ALBUMIN SERPL BCP-MCNC: 3.8 G/DL (ref 3.2–4.9)
ALBUMIN/GLOB SERPL: 1.2 G/DL
ALP SERPL-CCNC: 1014 U/L (ref 30–99)
ALT SERPL-CCNC: 70 U/L (ref 2–50)
ANION GAP SERPL CALC-SCNC: 12 MMOL/L (ref 7–16)
AST SERPL-CCNC: 55 U/L (ref 12–45)
BASOPHILS # BLD AUTO: 1.7 % (ref 0–1.8)
BASOPHILS # BLD: 0.14 K/UL (ref 0–0.12)
BILIRUB SERPL-MCNC: 0.7 MG/DL (ref 0.1–1.5)
BUN SERPL-MCNC: 21 MG/DL (ref 8–22)
CALCIUM SERPL-MCNC: 9.1 MG/DL (ref 8.5–10.5)
CHLORIDE SERPL-SCNC: 101 MMOL/L (ref 96–112)
CO2 SERPL-SCNC: 27 MMOL/L (ref 20–33)
CREAT SERPL-MCNC: 1.07 MG/DL (ref 0.5–1.4)
EOSINOPHIL # BLD AUTO: 0.25 K/UL (ref 0–0.51)
EOSINOPHIL NFR BLD: 3 % (ref 0–6.9)
ERYTHROCYTE [DISTWIDTH] IN BLOOD BY AUTOMATED COUNT: 45.5 FL (ref 35.9–50)
GFR SERPLBLD CREATININE-BSD FMLA CKD-EPI: 58 ML/MIN/1.73 M 2
GLOBULIN SER CALC-MCNC: 3.2 G/DL (ref 1.9–3.5)
GLUCOSE SERPL-MCNC: 127 MG/DL (ref 65–99)
HCT VFR BLD AUTO: 37 % (ref 37–47)
HGB BLD-MCNC: 12.3 G/DL (ref 12–16)
IMM GRANULOCYTES # BLD AUTO: 0.04 K/UL (ref 0–0.11)
IMM GRANULOCYTES NFR BLD AUTO: 0.5 % (ref 0–0.9)
INR PPP: 0.88 (ref 0.87–1.13)
LYMPHOCYTES # BLD AUTO: 1.35 K/UL (ref 1–4.8)
LYMPHOCYTES NFR BLD: 16.3 % (ref 22–41)
MCH RBC QN AUTO: 32.2 PG (ref 27–33)
MCHC RBC AUTO-ENTMCNC: 33.2 G/DL (ref 33.6–35)
MCV RBC AUTO: 96.9 FL (ref 81.4–97.8)
MONOCYTES # BLD AUTO: 0.77 K/UL (ref 0–0.85)
MONOCYTES NFR BLD AUTO: 9.3 % (ref 0–13.4)
NEUTROPHILS # BLD AUTO: 5.75 K/UL (ref 2–7.15)
NEUTROPHILS NFR BLD: 69.2 % (ref 44–72)
NRBC # BLD AUTO: 0 K/UL
NRBC BLD-RTO: 0 /100 WBC
PLATELET # BLD AUTO: 443 K/UL (ref 164–446)
PMV BLD AUTO: 10.2 FL (ref 9–12.9)
POTASSIUM SERPL-SCNC: 3.7 MMOL/L (ref 3.6–5.5)
PROT SERPL-MCNC: 7 G/DL (ref 6–8.2)
PROTHROMBIN TIME: 11.8 SEC (ref 12–14.6)
RBC # BLD AUTO: 3.82 M/UL (ref 4.2–5.4)
SODIUM SERPL-SCNC: 140 MMOL/L (ref 135–145)
WBC # BLD AUTO: 8.3 K/UL (ref 4.8–10.8)

## 2022-08-29 PROCEDURE — 36415 COLL VENOUS BLD VENIPUNCTURE: CPT

## 2022-08-29 PROCEDURE — 99213 OFFICE O/P EST LOW 20 MIN: CPT | Performed by: NURSE PRACTITIONER

## 2022-08-29 PROCEDURE — 80053 COMPREHEN METABOLIC PANEL: CPT

## 2022-08-29 PROCEDURE — 85025 COMPLETE CBC W/AUTO DIFF WBC: CPT

## 2022-08-29 PROCEDURE — 85610 PROTHROMBIN TIME: CPT

## 2022-08-29 RX ORDER — CLINDAMYCIN PHOSPHATE 11.9 MG/ML
SOLUTION TOPICAL
Qty: 60 ML | Refills: 1 | Status: SHIPPED | OUTPATIENT
Start: 2022-08-29 | End: 2022-10-27

## 2022-08-29 NOTE — PROGRESS NOTES
DERMATOLOGY NOTE  NEW VISIT       Chief complaint: Follow-Up and Rash    Per patient states has bed bugs again, has been staying in hotel for past 2 nights while house treated   Initial Hx   HPI: itch ness all over body , due to bed bugs   Onset:  X 1 month   Aggravating factors: none  Alleviating factors: none  New creams/topicals: medrol dose pack ,  New medications (up to last 6 months): no  New travel: no  Other exposures: no  Treatments: no          History of skin cancer: No  History of precancers/actinic keratoses: No  History of biopsies:Yes, Details: lower leg Bx done, results benign   History of blistering/severe sunburns:No  Family history of skin cancer:No  Family history of atypical moles:No      Allergies   Allergen Reactions    Tape Unspecified     Blisters, paper tape is ok        MEDICATIONS:  Medications relevant to specialty reviewed.     REVIEW OF SYSTEMS:   Positive for skin (see HPI)  Negative for fevers and chills       EXAM:  LMP 04/29/2005 (LMP Unknown)   Constitutional: Well-developed, well-nourished, and in no distress.     A focused skin exam was performed including the affected areas of the back, bilateral upper extremities and bilateral lower extremities and scalp. Notable findings on exam today listed below and/or in assessment/plan.     numerous erythematous papule in various stages of healing, some lesions with excoriation in all areas assessed--no evidence of localized infection  2 erythematous papule on scalp--vertex and occipitis    IMPRESSION / PLAN:    1. Pruritus, likely related to insect assault and dry skin    Encouraged use of emollients after shower  Advised use of second generation antihistamine, Allegra 2 pills at bedtime--can try increasing to 3 pills if needed  Dry skin handouts previously given  Follow up as needed    - triamcinolone acetonide (KENALOG) 0.1 % Cream; AAA twice a day for 3 weeks, then can use 2 weeks at a time  Dispense: 454 g; Refill: 1        2.  Folliculitis--secondary to insect assault and scratching and picking lesions    Rx below    - clindamycin (CLEOCIN) 1 % Solution; AAA twice a day for 1-2 weeks  Dispense: 60 mL; Refill: 1        Discussed risks, benefits, alternative treatments as well as common side effects associated with prescribed treatment, Patient verbalized understanding and agrees with plan regarding the above        Please note that this dictation was created using voice recognition software. I have made every reasonable attempt to correct obvious errors, but I expect that there are errors of grammar and possibly content that I did not discover before finalizing the note.      Return to clinic in: Return for PRN. and as needed for any new or changing skin lesions.

## 2022-09-07 ENCOUNTER — HOSPITAL ENCOUNTER (EMERGENCY)
Facility: MEDICAL CENTER | Age: 67
End: 2022-09-07
Attending: EMERGENCY MEDICINE
Payer: MEDICARE

## 2022-09-07 VITALS
HEART RATE: 76 BPM | TEMPERATURE: 97.9 F | RESPIRATION RATE: 17 BRPM | BODY MASS INDEX: 18.99 KG/M2 | SYSTOLIC BLOOD PRESSURE: 155 MMHG | OXYGEN SATURATION: 98 % | HEIGHT: 66 IN | DIASTOLIC BLOOD PRESSURE: 87 MMHG | WEIGHT: 118.17 LBS

## 2022-09-07 DIAGNOSIS — L02.91 ABSCESS: ICD-10-CM

## 2022-09-07 PROCEDURE — 700102 HCHG RX REV CODE 250 W/ 637 OVERRIDE(OP): Performed by: EMERGENCY MEDICINE

## 2022-09-07 PROCEDURE — A9270 NON-COVERED ITEM OR SERVICE: HCPCS | Performed by: EMERGENCY MEDICINE

## 2022-09-07 PROCEDURE — 700101 HCHG RX REV CODE 250

## 2022-09-07 PROCEDURE — 99283 EMERGENCY DEPT VISIT LOW MDM: CPT

## 2022-09-07 PROCEDURE — 303977 HCHG I & D

## 2022-09-07 RX ORDER — SULFAMETHOXAZOLE AND TRIMETHOPRIM 800; 160 MG/1; MG/1
1 TABLET ORAL ONCE
Status: COMPLETED | OUTPATIENT
Start: 2022-09-07 | End: 2022-09-07

## 2022-09-07 RX ORDER — SULFAMETHOXAZOLE AND TRIMETHOPRIM 800; 160 MG/1; MG/1
1 TABLET ORAL 2 TIMES DAILY
Qty: 14 TABLET | Refills: 0 | Status: SHIPPED | OUTPATIENT
Start: 2022-09-07 | End: 2022-09-14

## 2022-09-07 RX ORDER — LIDOCAINE HYDROCHLORIDE AND EPINEPHRINE 10; 10 MG/ML; UG/ML
INJECTION, SOLUTION INFILTRATION; PERINEURAL
Status: COMPLETED
Start: 2022-09-07 | End: 2022-09-07

## 2022-09-07 RX ADMIN — LIDOCAINE HYDROCHLORIDE,EPINEPHRINE BITARTRATE: 10; .01 INJECTION, SOLUTION INFILTRATION; PERINEURAL at 05:15

## 2022-09-07 RX ADMIN — SULFAMETHOXAZOLE AND TRIMETHOPRIM 1 TABLET: 800; 160 TABLET ORAL at 05:24

## 2022-09-07 ASSESSMENT — FIBROSIS 4 INDEX: FIB4 SCORE: 0.96

## 2022-09-07 NOTE — ED NOTES
Pt chief complaint:     Pt said, she had an incident with bed bugs around July. She believes that she has an infection from scratching.

## 2022-09-07 NOTE — ED TRIAGE NOTES
"Chief Complaint   Patient presents with    Rash     Patient awake, alert and oriented. Presenting to ED with complaints of skin rash and lump to top of forehead. Patient states that she had bed bugs in July and since then she has had her place exterminated a couple of times. Reports itching. Has multiple small sores to arms which patient states are old. She states that there are no longer bed bugs in her home and that she believes \"the sores are flaring up.\" Mostly concerned about an abscess to the left side of her forehead.        "

## 2022-09-07 NOTE — ED PROVIDER NOTES
"ED Provider Note    ER Provider Note         CHIEF COMPLAINT  Chief Complaint   Patient presents with    Rash     Patient awake, alert and oriented. Presenting to ED with complaints of skin rash and lump to top of forehead. Patient states that she had bed bugs in July and since then she has had her place exterminated a couple of times. Reports itching. Has multiple small sores to arms which patient states are old. She states that there are no longer bed bugs in her home and that she believes \"the sores are flaring up.\" Mostly concerned about an abscess to the left side of her forehead.        HPI  Anu Manuel is a 67 y.o. female who presents to the Emergency Department with pain over the top of her left head.  She says she is developing a boil.  She denies any fevers or chills.  She says there is some redness that extends onto her face.  She denies any nausea or vomiting.  She says that there is swelling in her legs over this is more chronic but is mainly concerned about the swelling over her head today.  She denies any drugs or alcohol at this time.    REVIEW OF SYSTEMS  See HPI for further details. All other systems are negative.     PAST MEDICAL HISTORY   has a past medical history of Adverse effect of anesthesia, Anesthesia, Arthritis, Cigarette smoker (quit 2013), Dental disorder, depression (8/30/2016), Diabetes mellitus type 1 ([facility]) (1989), Encounter for long-term (current) use of insulin ([facility]) (9/25/2013), Heart burn, High cholesterol, Hypertension, Hypothyroidism, postsurgical (1970), Indigestion, Infectious disease, Joint replacement, Liver failure (HCC), Pain, Polyneuropathy in diabetes(357.2) (6/10/2015), Status post appendectomy, and 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    SURGICAL HISTORY   has a past surgical history that includes hysterectomy, vaginal (2006); thyroid lobectomy (1973); lumpectomy (1976, 2005); cervical " "disk and fusion anterior (03/12/08); tonsillectomy (1963); cervical fusion posterior (1/16/2009); hardware removal neuro (1/16/2009); neck exploration (1/16/2009); abdominal hysterectomy total; lumpectomy; lumbar laminectomy diskectomy (Right, 5/10/2016); shoulder decompression arthroscopic (6/17/08); clavicle distal excision (6/17/08); shoulder arthroscopy w/ rotator cuff repair (10/9/08); njx aa&/strd tfrml epi lumbar/sacral 1 level (Right, 8/31/2016); spinal cord stimulator (N/A, 10/26/2018); thoracic laminectomy (N/A, 10/26/2018); appendectomy (2004); implant neurostim/ (N/A, 12/16/2019); irrigation & debridement neuro (1/19/2020); cath placement (1/25/2020); ercp,diagnostic (N/A, 10/26/2021); upper gi endoscopy,biopsy (N/A, 10/26/2021); upper gi endoscopy,diagnosis (N/A, 10/26/2021); peter by laparoscopy (N/A, 10/28/2021); and ercp,diagnostic (N/A, 1/14/2022).    SOCIAL HISTORY  Social History     Tobacco Use    Smoking status: Every Day     Packs/day: 0.50     Years: 30.00     Pack years: 15.00     Types: Cigarettes    Smokeless tobacco: Never   Vaping Use    Vaping Use: Never used   Substance Use Topics    Alcohol use: Not Currently    Drug use: Yes     Comment: Marijuana - Rarely      Social History     Substance and Sexual Activity   Drug Use Yes    Comment: Marijuana - Rarely       FAMILY HISTORY  Family History   Problem Relation Age of Onset    Hypertension Mother     Cancer Father        CURRENT MEDICATIONS  Home Medications    **Home medications have not yet been reviewed for this encounter**         ALLERGIES  Allergies   Allergen Reactions    Tape Unspecified     Blisters, paper tape is ok       PHYSICAL EXAM  VITAL SIGNS: BP (!) 155/95   Pulse 84   Temp 36.6 °C (97.8 °F) (Temporal)   Resp 16   Ht 1.676 m (5' 6\")   Wt 53.6 kg (118 lb 2.7 oz)   LMP 04/29/2005 (LMP Unknown)   SpO2 98%   BMI 19.07 kg/m²      Constitutional: Alert in no apparent distress.  HENT: No signs of trauma, " Bilateral external ears normal, Nose normal.  Abscess noted to the left upper forehead around the hairline that is 1 x 1 cm in size.  Eyes: Pupils are equal, Conjunctiva normal, Non-icteric.   Neck: Normal range of motion, No tenderness, Supple, No stridor.   Lymphatic: No lymphadenopathy noted.   Cardiovascular: Regular rate and rhythm, nondisplaced PMI  Thorax & Lungs: No respiratory distress,  No chest tenderness.   Abdomen: Bowel sounds normal, Soft, No tenderness, No masses, No pulsatile masses. No peritoneal signs.  Skin: Warm, Dry, No erythema, No rash.   Back: No bony tenderness, No CVA tenderness.   Extremities: Intact distal pulses, No edema, No tenderness, No cyanosis.  Musculoskeletal: Good range of motion in all major joints. No tenderness to palpation or major deformities noted.   Neurologic: Alert , Normal motor function, Normal sensory function, No focal deficits noted.   Psychiatric: Affect normal, Judgment normal, Mood normal.     DIAGNOSTIC STUDIES / PROCEDURES      INCISION AND DRAINAGE PROCEDURE NOTE:  Patient identification was confirmed and consent was obtained.  This procedure was performed by Dr. Reyes  Site: L forehead  Sterile procedures observed  Needle size: 27  Anesthetic used (type and amt): 5ml 1% w/ epi  Blade size: 15  Drainage:  purulent  Packing used none  Site anesthetized, incision made over site, wound drained and explored loculations, rinsed with copious amounts of normal saline, wound packed with sterile gauze, covered with dry, sterile dressing. Pt tolerated procedure well without complications. Instructions for care discussed verbally and pt provided with additional written instructions for homecare and f/u.      COURSE & MEDICAL DECISION MAKING  Pertinent Labs & Imaging studies reviewed. (See chart for details)    This is a 67 y.o. female that presents with what appears to be an abscess for the patient's left scalp.  I will incise and drain this abscess.  There is some  surrounding redness of which I am concerned will be cellulitis.  We will treat with Bactrim..     5:14 AM - Patient seen and examined at bedside.     Incision and drainage went well.  The patient's deal with chronic issues will follow this up with her primary care physician.  We will discharge him with antibiotics.          FINAL IMPRESSION  1. Abscess              Electronically signed by: Joseph Reyes M.D., 9/7/2022

## 2022-09-12 ENCOUNTER — TELEPHONE (OUTPATIENT)
Dept: SOCIAL WORK | Facility: CLINIC | Age: 67
End: 2022-09-12
Payer: MEDICARE

## 2022-09-13 NOTE — TELEPHONE ENCOUNTER
ED Follow-up  Call Attempts: 2  Date of ED visit: 09/07/22  Call Outcome: Other  Attempted to reach member x 2. Unsuccessful. Close encounter. Will be over the 7 day window.

## 2022-09-17 NOTE — DISCHARGE INSTRUCTIONS
MRI ADULT DISCHARGE INSTRUCTIONS    You have been medicated today for your scan. Please follow the instructions below to ensure your safe recovery. If you have any questions or problems, feel free to call us at [phone] or [phone].     1.   Have someone stay with you to assist you as needed.    2.   Do not drive or operate any mechanical devices.    3.   Do not perform any activity that requires concentration. Make no major decisions over the next 24 hours.     4.   Be careful changing positions from laying down to sitting or standing, as you may become dizzy.     5.   Do not drink alcohol for 48 hours.    6.   There are no restrictions for eating your normal meals. Drink fluids.    7.   You may continue your usual medications for pain, tranquilizers, muscle relaxants or sedatives when awake.     8.   Tomorrow, you may continue your normal daily activities.     9.   Pressure dressing on 10 - 15 minutes. If swelling or bleeding occurs when removed, continue placing direct pressure on injection site for another 5 minutes, or until bleeding stops.   Alprazolam (XANAX) tablet  What is this medicine?  You were prescribed ALPRAZOLAM (al PRAY tanja vazquez) for the procedure you had today. This medication is a benzodiazepine. It is used to treat anxiety and panic attacks.  This medicine may be used for other purposes; ask your health care provider or pharmacist if you have questions.  What side effects may I notice from receiving this medicine?  Side effects that you should report to your doctor or health care professional as soon as possible:  • allergic reactions like skin rash, itching or hives, swelling of the face, lips, or tongue  • confusion, forgetfulness  • depression  • difficulty sleeping  • difficulty speaking  • feeling faint or lightheaded, falls  • mood changes, excitability or aggressive behavior  • muscle cramps  • trouble passing urine or change in the amount of urine  • unusually weak or tired  Side effects  Pt. Back and forth to NICU at frequent intervals.  Tolerating activity well - baby to remain in NICU today.  Mom to be discharged home today.  Will await mom return from NICU.   that usually do not require medical attention (report to your doctor or health care professional if they continue or are bothersome):  • changes in appetite  • change in sex drive or performance  This list may not describe all possible side effects. Call your doctor for medical advice about side effects. You may report side effects to FDA at 2-381-BSA-2411.      I have been informed of and understand the above discharge instructions.

## 2022-09-20 ENCOUNTER — HOSPITAL ENCOUNTER (OUTPATIENT)
Dept: RADIOLOGY | Facility: MEDICAL CENTER | Age: 67
End: 2022-09-20
Attending: INTERNAL MEDICINE
Payer: MEDICARE

## 2022-09-20 DIAGNOSIS — I25.83 CORONARY ARTERY DISEASE DUE TO LIPID RICH PLAQUE: ICD-10-CM

## 2022-09-20 DIAGNOSIS — I25.10 CORONARY ARTERY DISEASE DUE TO LIPID RICH PLAQUE: ICD-10-CM

## 2022-09-20 PROCEDURE — 93880 EXTRACRANIAL BILAT STUDY: CPT | Mod: 26 | Performed by: INTERNAL MEDICINE

## 2022-09-20 PROCEDURE — 93922 UPR/L XTREMITY ART 2 LEVELS: CPT | Mod: 26 | Performed by: INTERNAL MEDICINE

## 2022-09-20 PROCEDURE — 93880 EXTRACRANIAL BILAT STUDY: CPT

## 2022-09-20 PROCEDURE — 93922 UPR/L XTREMITY ART 2 LEVELS: CPT

## 2022-10-06 ENCOUNTER — OFFICE VISIT (OUTPATIENT)
Dept: DERMATOLOGY | Facility: IMAGING CENTER | Age: 67
End: 2022-10-06
Payer: MEDICARE

## 2022-10-06 DIAGNOSIS — L29.9 ITCHING: ICD-10-CM

## 2022-10-06 DIAGNOSIS — R79.89 OTHER SPECIFIED ABNORMAL FINDINGS OF BLOOD CHEMISTRY: ICD-10-CM

## 2022-10-06 DIAGNOSIS — R63.4 WEIGHT LOSS: ICD-10-CM

## 2022-10-06 DIAGNOSIS — R21 RASH: ICD-10-CM

## 2022-10-06 PROCEDURE — 11104 PUNCH BX SKIN SINGLE LESION: CPT | Performed by: DERMATOLOGY

## 2022-10-06 PROCEDURE — 99214 OFFICE O/P EST MOD 30 MIN: CPT | Mod: 25 | Performed by: DERMATOLOGY

## 2022-10-06 RX ORDER — LEVOTHYROXINE SODIUM 125 MCG
250 TABLET ORAL
COMMUNITY
Start: 2022-08-12 | End: 2023-08-28 | Stop reason: SDUPTHER

## 2022-10-06 RX ORDER — BETAMETHASONE DIPROPIONATE 0.5 MG/G
CREAM TOPICAL
Qty: 45 G | Refills: 1 | Status: SHIPPED | OUTPATIENT
Start: 2022-10-06 | End: 2023-03-20

## 2022-10-06 NOTE — PROGRESS NOTES
"CC: rash / itch     Subjective: patient Previously seen by Val Mcgee 08/29/2022. Pt here today   for itching of arms as primary complaint. Has hx of several months of itching and scratching. Previously arms, body.  Pt states Clindamycin improves symptoms, but not completely. And has used Triamcinolone, but not using much. Pt states she's still has itchiness.    Reports chair acquired at second hand store may have brought bed bugs into home. House treated a couple times without improvement in symptoms and \"last  said there may not even have been bugs in the first place.\"  Used allegra for a while, but didn't help.     Has leg swelling and weight gain, 10 pounds in recent week. Is going to  medication to help reduce water weight soon.    Had admission recently with 40 pound weight gain, but preceding that had substantial weight loss.     Reports past gallbladder problems and removal.   Has GI scope procedure scheduled for November.  Reports cancer screening up to date but no paps since hysterectomy    Seen at [facility]    History of skin cancer: No  History of precancers/actinic keratoses: No  History of biopsies:Yes, Details: lower leg Bx done, results benign   History of blistering/severe sunburns:No  Family history of skin cancer:No       PMH: T1DM, polyneuropathy, elevated liver enzymes  Social: smoker    EXAM:Gen: WDWN female in NAD. Slender build. Lower legs edema. Skin: no rashes evident on exposed skin but marks of excoriation on arms.  No burrows. No interdigit papules/pustules. Few papules on dorsal left hand, appearing annular dist.    Imaging:     CT Abd/pelvis May 2022:  Small inguinal, retroperitoneal and mesenteric lymph nodes.     IMPRESSION:     1.  The descending and rectosigmoid colon is decompressed, limiting evaluation for mild infectious or inflammatory wall thickening.  2.  Mild bladder wall thickening on the right and posteriorly is not excluded. Correlation with " urinalysis is recommended.  3.  Biliary ductal prominence is unchanged and likely related to ectasia in the setting of prior cholecystectomy.  4.  Atherosclerotic plaque.  5.  Hypodensity along the falciform ligament is mildly decreased in size compared to prior likely a small postsurgical fluid collection.    Labs: elevated glucose >100 most labs in recent 8 months  Persistent transaminitis and alk phos elevation in recent 8 months  TSH>13-15 multiple labs in last 8 months, highest 50+    IMPRESSION / PLAN:  1. Pruritus, consider possible causes from underlying systemic dz/AI?: elevated transminitis, alk phos, TSH>15 as likely:  -advised GI scope asap  -labs: CEA/ consider , TSH, quantiferon, stool O&P, ascaris IgE, hepatitis panel, ACE, LDH  Betamethasone cream BID PRN body/hands  Advised OTC antihistamine Qday X 30 days, Claritin or Zyrtec      2.   Rash NOS: left dorsal hand: consider GA, sarcoid, papular eczema, other >> scabies  -consent for bx, including R/B/A. Cleaned with EtOH, anesthesia with lidocaine 1% + epinephrine, 4mm punch bx, 4-0 Prolene to close  -vaseline/bandage and wound care reviewed  -s/r in 1-2 weeks    F/u 3-4 weeks/PRN

## 2022-10-07 ENCOUNTER — HOSPITAL ENCOUNTER (OUTPATIENT)
Dept: LAB | Facility: MEDICAL CENTER | Age: 67
End: 2022-10-07
Attending: DERMATOLOGY
Payer: MEDICARE

## 2022-10-07 DIAGNOSIS — L29.9 ITCHING: ICD-10-CM

## 2022-10-07 DIAGNOSIS — R79.89 OTHER SPECIFIED ABNORMAL FINDINGS OF BLOOD CHEMISTRY: ICD-10-CM

## 2022-10-07 DIAGNOSIS — R63.4 WEIGHT LOSS: ICD-10-CM

## 2022-10-07 LAB
CEA SERPL-MCNC: 5.6 NG/ML (ref 0–3)
CRP SERPL HS-MCNC: 0.3 MG/DL (ref 0–0.75)
ERYTHROCYTE [SEDIMENTATION RATE] IN BLOOD BY WESTERGREN METHOD: 107 MM/HOUR (ref 0–25)
HAV IGM SERPL QL IA: NORMAL
HBV CORE IGM SER QL: NORMAL
HBV SURFACE AG SER QL: NORMAL
HCV AB SER QL: NORMAL
LDH SERPL L TO P-CCNC: 346 U/L (ref 107–266)
TSH SERPL DL<=0.005 MIU/L-ACNC: 0.09 UIU/ML (ref 0.38–5.33)

## 2022-10-07 PROCEDURE — 84439 ASSAY OF FREE THYROXINE: CPT

## 2022-10-07 PROCEDURE — 82378 CARCINOEMBRYONIC ANTIGEN: CPT

## 2022-10-07 PROCEDURE — 83615 LACTATE (LD) (LDH) ENZYME: CPT

## 2022-10-07 PROCEDURE — 36415 COLL VENOUS BLD VENIPUNCTURE: CPT

## 2022-10-07 PROCEDURE — 85652 RBC SED RATE AUTOMATED: CPT

## 2022-10-07 PROCEDURE — 86003 ALLG SPEC IGE CRUDE XTRC EA: CPT

## 2022-10-07 PROCEDURE — 84443 ASSAY THYROID STIM HORMONE: CPT

## 2022-10-07 PROCEDURE — 82164 ANGIOTENSIN I ENZYME TEST: CPT

## 2022-10-07 PROCEDURE — 80074 ACUTE HEPATITIS PANEL: CPT

## 2022-10-07 PROCEDURE — 86140 C-REACTIVE PROTEIN: CPT

## 2022-10-07 PROCEDURE — 86480 TB TEST CELL IMMUN MEASURE: CPT

## 2022-10-08 LAB — T4 FREE SERPL-MCNC: 1.94 NG/DL (ref 0.93–1.7)

## 2022-10-09 LAB — ACE SERPL-CCNC: 87 U/L (ref 16–85)

## 2022-10-10 LAB
ASCARIS IGE QN: <0.1 KU/L
DEPRECATED MISC ALLERGEN IGE RAST QL: NORMAL
GAMMA INTERFERON BACKGROUND BLD IA-ACNC: 0.08 IU/ML
M TB IFN-G BLD-IMP: NEGATIVE
M TB IFN-G CD4+ BCKGRND COR BLD-ACNC: 0.02 IU/ML
MITOGEN IGNF BCKGRD COR BLD-ACNC: 0.5 IU/ML
QFT TB2 - NIL TBQ2: -0.01 IU/ML

## 2022-10-13 ENCOUNTER — HOSPITAL ENCOUNTER (OUTPATIENT)
Dept: LAB | Facility: MEDICAL CENTER | Age: 67
End: 2022-10-13
Attending: DERMATOLOGY
Payer: MEDICARE

## 2022-10-13 DIAGNOSIS — R97.8 OTHER ABNORMAL TUMOR MARKERS: ICD-10-CM

## 2022-10-13 DIAGNOSIS — R97.0 ELEVATED CEA: ICD-10-CM

## 2022-10-13 LAB — CANCER AG19-9 SERPL-ACNC: 414 U/ML (ref 0–35)

## 2022-10-13 PROCEDURE — 86301 IMMUNOASSAY TUMOR CA 19-9: CPT

## 2022-10-13 PROCEDURE — 36415 COLL VENOUS BLD VENIPUNCTURE: CPT

## 2022-10-13 NOTE — PROGRESS NOTES
Ordered  with elevated CEA.    Spoke with Sathya, would require blood within 24-48 hours.     Will contact patient to stop by lab to have blood work done.     @8:27 am = spoke to patient and made aware of lab ordered - .  Identified that CEA may be cancer marker and  is another cancer marker. Instructed patient that I have forwarded chart and labs to Payton Rich/cancer care team to help in additional workup.     Jin

## 2022-10-14 ENCOUNTER — TELEPHONE (OUTPATIENT)
Dept: DERMATOLOGY | Facility: IMAGING CENTER | Age: 67
End: 2022-10-14
Payer: MEDICARE

## 2022-10-14 NOTE — TELEPHONE ENCOUNTER
CURTIS Guardado MA at Dr. Salinas office to try to share recent lab results ahead of GI endoscopy.      Awaiting call back to confirm fax number and to connect via cell or office phone with MD to discuss any additional w/u.    Jin

## 2022-10-18 ENCOUNTER — NON-PROVIDER VISIT (OUTPATIENT)
Dept: DERMATOLOGY | Facility: IMAGING CENTER | Age: 67
End: 2022-10-18
Payer: MEDICARE

## 2022-10-18 NOTE — PROGRESS NOTES
Anu Manuel is a 67 y.o. female here for a Non-Provider Visit for Suture Removal.    Sutures were placed by Dr. Manuel  on date: 10/06/2022  Skin is healed: Yes  Provider notified if skin is not healed, or if there is redness, heat, pain, or drainage from incision: N\A  Sutures removed.   Mastisol and steristips are placed: No    Advised to use emollient (vaseline, aquaphor, etc.) as needed, avoid peroxide and antibiotic ointment to reduce irritation.     Path report has been reviewed by provider.  Path report has reviewed with patient.

## 2022-10-20 ENCOUNTER — TELEPHONE (OUTPATIENT)
Dept: DERMATOLOGY | Facility: IMAGING CENTER | Age: 67
End: 2022-10-20
Payer: MEDICARE

## 2022-10-20 NOTE — TELEPHONE ENCOUNTER
Message left with MA of [facility] Dr. Salinas requesting call back for fax number to send GI labs ahead of patient's scope procedure in November.     [phone]

## 2022-10-22 DIAGNOSIS — L73.9 FOLLICULITIS: ICD-10-CM

## 2022-10-24 DIAGNOSIS — E10.9 TYPE 1 DIABETES MELLITUS ON INSULIN THERAPY (HCC): ICD-10-CM

## 2022-10-24 RX ORDER — INSULIN DEGLUDEC 100 U/ML
24 INJECTION, SOLUTION SUBCUTANEOUS EVERY EVENING
Qty: 30 ML | Refills: 2 | Status: SHIPPED | OUTPATIENT
Start: 2022-10-24 | End: 2023-03-02

## 2022-10-27 RX ORDER — CLINDAMYCIN PHOSPHATE 11.9 MG/ML
SOLUTION TOPICAL
Qty: 60 ML | Refills: 1 | Status: SHIPPED | OUTPATIENT
Start: 2022-10-27 | End: 2023-08-28

## 2022-10-28 ENCOUNTER — DOCUMENTATION (OUTPATIENT)
Dept: HEALTH INFORMATION MANAGEMENT | Facility: OTHER | Age: 67
End: 2022-10-28
Payer: MEDICARE

## 2022-10-31 ENCOUNTER — TELEPHONE (OUTPATIENT)
Dept: ENDOCRINOLOGY | Facility: MEDICAL CENTER | Age: 67
End: 2022-10-31
Payer: MEDICARE

## 2022-10-31 NOTE — TELEPHONE ENCOUNTER
VOICEMAIL  1. Caller Name: Anu Manuel                        Call Back Number: [phone] (home)      2. Message: Patient called and left a message to get her medication filled. I have called her back and left her message stating that medication has been refilled.     3. Patient approves office to leave a detailed voicemail/MyChart message: yes

## 2022-11-15 ENCOUNTER — HOSPITAL ENCOUNTER (OUTPATIENT)
Dept: LAB | Facility: MEDICAL CENTER | Age: 67
End: 2022-11-15
Attending: INTERNAL MEDICINE
Payer: MEDICARE

## 2022-11-15 DIAGNOSIS — E55.9 VITAMIN D DEFICIENCY: ICD-10-CM

## 2022-11-15 DIAGNOSIS — N18.9 CHRONIC KIDNEY DISEASE, UNSPECIFIED CKD STAGE: ICD-10-CM

## 2022-11-15 DIAGNOSIS — E89.0 HYPOTHYROIDISM, POSTSURGICAL: ICD-10-CM

## 2022-11-15 DIAGNOSIS — E78.5 DYSLIPIDEMIA: ICD-10-CM

## 2022-11-15 DIAGNOSIS — Z79.4 LONG-TERM INSULIN USE (HCC): ICD-10-CM

## 2022-11-15 DIAGNOSIS — E10.9 TYPE 1 DIABETES MELLITUS ON INSULIN THERAPY (HCC): ICD-10-CM

## 2022-11-15 DIAGNOSIS — I10 ESSENTIAL HYPERTENSION: ICD-10-CM

## 2022-11-15 LAB
BASOPHILS # BLD AUTO: 1.7 % (ref 0–1.8)
BASOPHILS # BLD: 0.1 K/UL (ref 0–0.12)
EOSINOPHIL # BLD AUTO: 0.11 K/UL (ref 0–0.51)
EOSINOPHIL NFR BLD: 1.9 % (ref 0–6.9)
ERYTHROCYTE [DISTWIDTH] IN BLOOD BY AUTOMATED COUNT: 48.5 FL (ref 35.9–50)
ERYTHROCYTE [DISTWIDTH] IN BLOOD BY AUTOMATED COUNT: 48.6 FL (ref 35.9–50)
HCT VFR BLD AUTO: 34.1 % (ref 37–47)
HCT VFR BLD AUTO: 34.4 % (ref 37–47)
HGB BLD-MCNC: 10.9 G/DL (ref 12–16)
HGB BLD-MCNC: 10.9 G/DL (ref 12–16)
IMM GRANULOCYTES # BLD AUTO: 0.03 K/UL (ref 0–0.11)
IMM GRANULOCYTES NFR BLD AUTO: 0.5 % (ref 0–0.9)
INR PPP: 0.9 (ref 0.87–1.13)
LYMPHOCYTES # BLD AUTO: 1.12 K/UL (ref 1–4.8)
LYMPHOCYTES NFR BLD: 19.5 % (ref 22–41)
MCH RBC QN AUTO: 30.7 PG (ref 27–33)
MCH RBC QN AUTO: 31 PG (ref 27–33)
MCHC RBC AUTO-ENTMCNC: 31.7 G/DL (ref 33.6–35)
MCHC RBC AUTO-ENTMCNC: 32 G/DL (ref 33.6–35)
MCV RBC AUTO: 96.9 FL (ref 81.4–97.8)
MCV RBC AUTO: 96.9 FL (ref 81.4–97.8)
MONOCYTES # BLD AUTO: 0.59 K/UL (ref 0–0.85)
MONOCYTES NFR BLD AUTO: 10.3 % (ref 0–13.4)
NEUTROPHILS # BLD AUTO: 3.8 K/UL (ref 2–7.15)
NEUTROPHILS NFR BLD: 66.1 % (ref 44–72)
NRBC # BLD AUTO: 0 K/UL
NRBC BLD-RTO: 0 /100 WBC
PLATELET # BLD AUTO: 341 K/UL (ref 164–446)
PLATELET # BLD AUTO: 344 K/UL (ref 164–446)
PMV BLD AUTO: 10.8 FL (ref 9–12.9)
PMV BLD AUTO: 10.9 FL (ref 9–12.9)
PROTHROMBIN TIME: 12.1 SEC (ref 12–14.6)
RBC # BLD AUTO: 3.52 M/UL (ref 4.2–5.4)
RBC # BLD AUTO: 3.55 M/UL (ref 4.2–5.4)
WBC # BLD AUTO: 5.8 K/UL (ref 4.8–10.8)
WBC # BLD AUTO: 6.2 K/UL (ref 4.8–10.8)

## 2022-11-15 PROCEDURE — 36415 COLL VENOUS BLD VENIPUNCTURE: CPT

## 2022-11-15 PROCEDURE — 85027 COMPLETE CBC AUTOMATED: CPT

## 2022-11-15 PROCEDURE — 82306 VITAMIN D 25 HYDROXY: CPT

## 2022-11-15 PROCEDURE — 85025 COMPLETE CBC W/AUTO DIFF WBC: CPT

## 2022-11-15 PROCEDURE — 84439 ASSAY OF FREE THYROXINE: CPT

## 2022-11-15 PROCEDURE — 84443 ASSAY THYROID STIM HORMONE: CPT

## 2022-11-15 PROCEDURE — 84481 FREE ASSAY (FT-3): CPT

## 2022-11-15 PROCEDURE — 80053 COMPREHEN METABOLIC PANEL: CPT

## 2022-11-15 PROCEDURE — 85610 PROTHROMBIN TIME: CPT

## 2022-11-16 LAB
25(OH)D3 SERPL-MCNC: 38 NG/ML (ref 30–100)
ALBUMIN SERPL BCP-MCNC: 3.4 G/DL (ref 3.2–4.9)
ALBUMIN/GLOB SERPL: 1 G/DL
ALP SERPL-CCNC: 1341 U/L (ref 30–99)
ALT SERPL-CCNC: 200 U/L (ref 2–50)
ANION GAP SERPL CALC-SCNC: 11 MMOL/L (ref 7–16)
AST SERPL-CCNC: 121 U/L (ref 12–45)
BILIRUB SERPL-MCNC: 0.4 MG/DL (ref 0.1–1.5)
BUN SERPL-MCNC: 29 MG/DL (ref 8–22)
CALCIUM SERPL-MCNC: 8.8 MG/DL (ref 8.5–10.5)
CHLORIDE SERPL-SCNC: 100 MMOL/L (ref 96–112)
CO2 SERPL-SCNC: 23 MMOL/L (ref 20–33)
CREAT SERPL-MCNC: 1.19 MG/DL (ref 0.5–1.4)
GFR SERPLBLD CREATININE-BSD FMLA CKD-EPI: 51 ML/MIN/1.73 M 2
GLOBULIN SER CALC-MCNC: 3.5 G/DL (ref 1.9–3.5)
GLUCOSE SERPL-MCNC: 440 MG/DL (ref 65–99)
POTASSIUM SERPL-SCNC: 4.7 MMOL/L (ref 3.6–5.5)
PROT SERPL-MCNC: 6.9 G/DL (ref 6–8.2)
SODIUM SERPL-SCNC: 134 MMOL/L (ref 135–145)
T3FREE SERPL-MCNC: 2.43 PG/ML (ref 2–4.4)
T4 FREE SERPL-MCNC: 1.64 NG/DL (ref 0.93–1.7)
TSH SERPL DL<=0.005 MIU/L-ACNC: 0.14 UIU/ML (ref 0.38–5.33)

## 2022-11-19 ENCOUNTER — HOSPITAL ENCOUNTER (OUTPATIENT)
Dept: LAB | Facility: MEDICAL CENTER | Age: 67
End: 2022-11-19
Attending: INTERNAL MEDICINE
Payer: MEDICARE

## 2022-11-19 DIAGNOSIS — I25.10 CORONARY ARTERY DISEASE DUE TO LIPID RICH PLAQUE: ICD-10-CM

## 2022-11-19 DIAGNOSIS — I25.83 CORONARY ARTERY DISEASE DUE TO LIPID RICH PLAQUE: ICD-10-CM

## 2022-11-19 LAB
ALBUMIN SERPL BCP-MCNC: 3.8 G/DL (ref 3.2–4.9)
ALBUMIN SERPL BCP-MCNC: 3.9 G/DL (ref 3.2–4.9)
ALBUMIN/GLOB SERPL: 1 G/DL
ALBUMIN/GLOB SERPL: 1 G/DL
ALP SERPL-CCNC: 1354 U/L (ref 30–99)
ALP SERPL-CCNC: 1359 U/L (ref 30–99)
ALT SERPL-CCNC: 137 U/L (ref 2–50)
ALT SERPL-CCNC: 138 U/L (ref 2–50)
ANION GAP SERPL CALC-SCNC: 14 MMOL/L (ref 7–16)
ANION GAP SERPL CALC-SCNC: 14 MMOL/L (ref 7–16)
AST SERPL-CCNC: 73 U/L (ref 12–45)
AST SERPL-CCNC: 76 U/L (ref 12–45)
BASOPHILS # BLD AUTO: 2.1 % (ref 0–1.8)
BASOPHILS # BLD: 0.15 K/UL (ref 0–0.12)
BILIRUB SERPL-MCNC: 0.5 MG/DL (ref 0.1–1.5)
BILIRUB SERPL-MCNC: 0.5 MG/DL (ref 0.1–1.5)
BUN SERPL-MCNC: 28 MG/DL (ref 8–22)
BUN SERPL-MCNC: 29 MG/DL (ref 8–22)
CALCIUM SERPL-MCNC: 9.5 MG/DL (ref 8.5–10.5)
CALCIUM SERPL-MCNC: 9.5 MG/DL (ref 8.5–10.5)
CANCER AG19-9 SERPL-ACNC: 748 U/ML (ref 0–35)
CEA SERPL-MCNC: 7.7 NG/ML (ref 0–3)
CHLORIDE SERPL-SCNC: 102 MMOL/L (ref 96–112)
CHLORIDE SERPL-SCNC: 103 MMOL/L (ref 96–112)
CHOLEST SERPL-MCNC: 244 MG/DL (ref 100–199)
CO2 SERPL-SCNC: 23 MMOL/L (ref 20–33)
CO2 SERPL-SCNC: 23 MMOL/L (ref 20–33)
CREAT SERPL-MCNC: 1.07 MG/DL (ref 0.5–1.4)
CREAT SERPL-MCNC: 1.11 MG/DL (ref 0.5–1.4)
EOSINOPHIL # BLD AUTO: 0.14 K/UL (ref 0–0.51)
EOSINOPHIL NFR BLD: 2 % (ref 0–6.9)
ERYTHROCYTE [DISTWIDTH] IN BLOOD BY AUTOMATED COUNT: 49.5 FL (ref 35.9–50)
FASTING STATUS PATIENT QL REPORTED: NORMAL
GFR SERPLBLD CREATININE-BSD FMLA CKD-EPI: 55 ML/MIN/1.73 M 2
GFR SERPLBLD CREATININE-BSD FMLA CKD-EPI: 57 ML/MIN/1.73 M 2
GLOBULIN SER CALC-MCNC: 4 G/DL (ref 1.9–3.5)
GLOBULIN SER CALC-MCNC: 4 G/DL (ref 1.9–3.5)
GLUCOSE SERPL-MCNC: 212 MG/DL (ref 65–99)
GLUCOSE SERPL-MCNC: 213 MG/DL (ref 65–99)
HCT VFR BLD AUTO: 40.5 % (ref 37–47)
HDLC SERPL-MCNC: 114 MG/DL
HGB BLD-MCNC: 12.5 G/DL (ref 12–16)
IMM GRANULOCYTES # BLD AUTO: 0.04 K/UL (ref 0–0.11)
IMM GRANULOCYTES NFR BLD AUTO: 0.6 % (ref 0–0.9)
INR PPP: 0.91 (ref 0.87–1.13)
LDLC SERPL CALC-MCNC: 111 MG/DL
LYMPHOCYTES # BLD AUTO: 1.12 K/UL (ref 1–4.8)
LYMPHOCYTES NFR BLD: 16 % (ref 22–41)
MCH RBC QN AUTO: 30.3 PG (ref 27–33)
MCHC RBC AUTO-ENTMCNC: 30.9 G/DL (ref 33.6–35)
MCV RBC AUTO: 98.1 FL (ref 81.4–97.8)
MONOCYTES # BLD AUTO: 0.56 K/UL (ref 0–0.85)
MONOCYTES NFR BLD AUTO: 8 % (ref 0–13.4)
NEUTROPHILS # BLD AUTO: 4.99 K/UL (ref 2–7.15)
NEUTROPHILS NFR BLD: 71.3 % (ref 44–72)
NRBC # BLD AUTO: 0 K/UL
NRBC BLD-RTO: 0 /100 WBC
PLATELET # BLD AUTO: 409 K/UL (ref 164–446)
PMV BLD AUTO: 10.6 FL (ref 9–12.9)
POTASSIUM SERPL-SCNC: 4 MMOL/L (ref 3.6–5.5)
POTASSIUM SERPL-SCNC: 4.4 MMOL/L (ref 3.6–5.5)
PROT SERPL-MCNC: 7.8 G/DL (ref 6–8.2)
PROT SERPL-MCNC: 7.9 G/DL (ref 6–8.2)
PROTHROMBIN TIME: 12.2 SEC (ref 12–14.6)
RBC # BLD AUTO: 4.13 M/UL (ref 4.2–5.4)
SODIUM SERPL-SCNC: 139 MMOL/L (ref 135–145)
SODIUM SERPL-SCNC: 140 MMOL/L (ref 135–145)
TRIGL SERPL-MCNC: 93 MG/DL (ref 0–149)
WBC # BLD AUTO: 7 K/UL (ref 4.8–10.8)

## 2022-11-19 PROCEDURE — 80053 COMPREHEN METABOLIC PANEL: CPT | Mod: 91

## 2022-11-19 PROCEDURE — 80061 LIPID PANEL: CPT

## 2022-11-19 PROCEDURE — 86301 IMMUNOASSAY TUMOR CA 19-9: CPT

## 2022-11-19 PROCEDURE — 85025 COMPLETE CBC W/AUTO DIFF WBC: CPT

## 2022-11-19 PROCEDURE — 82378 CARCINOEMBRYONIC ANTIGEN: CPT

## 2022-11-19 PROCEDURE — 85610 PROTHROMBIN TIME: CPT

## 2022-11-19 PROCEDURE — 80053 COMPREHEN METABOLIC PANEL: CPT

## 2022-11-19 PROCEDURE — 36415 COLL VENOUS BLD VENIPUNCTURE: CPT

## 2022-11-23 ENCOUNTER — HOSPITAL ENCOUNTER (OUTPATIENT)
Dept: RADIOLOGY | Facility: MEDICAL CENTER | Age: 67
End: 2022-11-23
Attending: INTERNAL MEDICINE
Payer: MEDICARE

## 2022-11-23 DIAGNOSIS — K83.1 CHOLESTASIS: ICD-10-CM

## 2022-11-23 DIAGNOSIS — R97.0 ELEVATED CEA: ICD-10-CM

## 2022-11-23 DIAGNOSIS — R97.8 ELEVATED CA 19-9 LEVEL: ICD-10-CM

## 2022-11-23 PROCEDURE — 700117 HCHG RX CONTRAST REV CODE 255: Performed by: INTERNAL MEDICINE

## 2022-11-23 PROCEDURE — 74170 CT ABD WO CNTRST FLWD CNTRST: CPT

## 2022-11-23 RX ADMIN — IOHEXOL 100 ML: 350 INJECTION, SOLUTION INTRAVENOUS at 14:45

## 2022-11-29 ENCOUNTER — NON-PROVIDER VISIT (OUTPATIENT)
Dept: ENDOCRINOLOGY | Facility: MEDICAL CENTER | Age: 67
End: 2022-11-29
Attending: INTERNAL MEDICINE
Payer: MEDICARE

## 2022-11-29 VITALS
BODY MASS INDEX: 20.25 KG/M2 | DIASTOLIC BLOOD PRESSURE: 72 MMHG | WEIGHT: 126 LBS | HEART RATE: 72 BPM | HEIGHT: 66 IN | OXYGEN SATURATION: 98 % | SYSTOLIC BLOOD PRESSURE: 140 MMHG

## 2022-11-29 DIAGNOSIS — E10.9 TYPE 1 DIABETES MELLITUS ON INSULIN THERAPY (HCC): ICD-10-CM

## 2022-11-29 LAB
HBA1C MFR BLD: 10.8 % (ref 0–5.6)
INT CON NEG: ABNORMAL
INT CON POS: ABNORMAL

## 2022-11-29 PROCEDURE — 83036 HEMOGLOBIN GLYCOSYLATED A1C: CPT

## 2022-11-29 PROCEDURE — 99213 OFFICE O/P EST LOW 20 MIN: CPT | Performed by: INTERNAL MEDICINE

## 2022-11-29 RX ORDER — PROCHLORPERAZINE 25 MG/1
1 SUPPOSITORY RECTAL
Qty: 1 EACH | Refills: 3 | Status: SHIPPED | OUTPATIENT
Start: 2022-11-29 | End: 2023-04-04 | Stop reason: SDUPTHER

## 2022-11-29 RX ORDER — PROCHLORPERAZINE 25 MG/1
1 SUPPOSITORY RECTAL CONTINUOUS
Qty: 1 EACH | Refills: 0 | Status: SHIPPED | OUTPATIENT
Start: 2022-11-29 | End: 2023-05-27

## 2022-11-29 RX ORDER — PROCHLORPERAZINE 25 MG/1
1 SUPPOSITORY RECTAL
Qty: 9 EACH | Refills: 3 | Status: SHIPPED | OUTPATIENT
Start: 2022-11-29 | End: 2023-05-27

## 2022-11-29 ASSESSMENT — FIBROSIS 4 INDEX: FIB4 SCORE: 0.99

## 2022-11-29 NOTE — PROGRESS NOTES
RN-CDE Note    Subjective:   Endocrinology Clinic Progress Note  PCP: Jarrell Yates M.D.    HPI:  Anu Manuel is a 67 y.o. old patient who is seen today by the Diabetes Nurse Specialist for review of Type 1 Diabetes and Hypothyroidism.  Recent changes in health: She states she has had a rash all over that they say is a diabetic rash and is also having some liver problems.  She is feeling a little more anxious.  DM:   Last A1c:   Lab Results   Component Value Date/Time    HBA1C 10.8 (A) 11/29/2022 04:50 PM      Previous A1c was 8.7 on 8/19/22  A1C GOAL: < 7    Diabetes Medications:   Novolog 1unit:5 grams carbohydrates plus 1:50>150- usually only giving twice daily)  Tresiba 22 units daily      Exercise: Not able to walk much due to hip and back pain  Diet: Oatmeal or english muffin or eggs with cheese.  Snacks on fruit, lunch is sandwich.  Dinner is protein, vegetables, and carbohyrates.  Patient's body mass index is 20.34 kg/m². Exercise and nutrition counseling were performed at this visit.    Glucose monitoring frequency: See John download    Hypoglycemic episodes: no  Last Retinal Exam:  Cataract surgery recently and saw  Mo on 8/16/22  Daily Foot Exam: Yes   Monofilament testing with a 10 gram force: sensation intact: intact bilaterally  Visual Inspection: Feet without maceration, ulcers, fissures.  Pedal pulses: intact bilaterally   Foot Exam:  Monofilament: done  Lab Results   Component Value Date/Time    MALBCRT see below 07/13/2022 03:17 PM    MICROALBUR >440.0 07/13/2022 03:17 PM        ACR Albumin/Creatinine Ratio goal <30     HTN:   Blood pressure goal <140/<80 .   Currently Rx ACE/ARB: No    Dyslipidemia:    Lab Results   Component Value Date/Time    CHOLSTRLTOT 244 (H) 11/19/2022 10:28 AM     (H) 11/19/2022 10:28 AM     11/19/2022 10:28 AM    TRIGLYCERIDE 93 11/19/2022 10:28 AM         Currently Rx Statin: Yes     She  reports that she has been smoking cigarettes. She has  a 15.00 pack-year smoking history. She has never used smokeless tobacco.      Plan:     Discussed and educated on:   - All medications, side effects and compliance (discussed carefully)  - Annual eye examinations at Ophthalmology  - Home glucose monitoring emphasized  - Weight control and daily exercise    Recommended medication changes: She is interested  in getting on the Tandem pump and Dexcom CGM.  She will take her Novolog every time she eats and adjust her insulin as needed.  She will follow up in 3 months.  Her information has been faxed to the [employer] Rep.

## 2022-12-02 ENCOUNTER — OFFICE VISIT (OUTPATIENT)
Dept: DERMATOLOGY | Facility: IMAGING CENTER | Age: 67
End: 2022-12-02
Payer: MEDICARE

## 2022-12-02 DIAGNOSIS — L92.0 GRANULOMA ANNULARE: ICD-10-CM

## 2022-12-02 DIAGNOSIS — R79.89 HIGH SERUM CARBOHYDRATE ANTIGEN 19-9 (CA19-9): ICD-10-CM

## 2022-12-02 DIAGNOSIS — R74.02 ELEVATED LDH: ICD-10-CM

## 2022-12-02 DIAGNOSIS — R97.0 ELEVATED CEA: ICD-10-CM

## 2022-12-02 DIAGNOSIS — R74.8 ELEVATED ALKALINE PHOSPHATASE LEVEL: ICD-10-CM

## 2022-12-02 DIAGNOSIS — R74.01 TRANSAMINITIS: ICD-10-CM

## 2022-12-02 DIAGNOSIS — L29.9 ITCHING: ICD-10-CM

## 2022-12-02 PROCEDURE — 99214 OFFICE O/P EST MOD 30 MIN: CPT | Performed by: DERMATOLOGY

## 2022-12-02 NOTE — PROGRESS NOTES
"CC: rash / itch     Subjective: patient here fv for rash on arms and face, pt states slight improvement. Still having flare ups. Mask aggravates face. Topicals are helping on hands and arms. \"Nerves may worsen it\".    Using betamethasone cream which really helps. TAC cream - has a large supply. Topical clindamycin solution hasn't helped a lot.    Getting w/u with Dr. Rita ESTES with plans to send to Liver doctor at [place]. Unclear what origin of  may be.     Patient tearful today - received large heating bill, needs to put dog down. Forgot phone today. Continues to be worried by state of health and notable increase in . Frustrated that she cannot get gut rx when it had helped in the past.     Prior note:   \"Pt here today for itching of arms as primary complaint. Has hx of several months of itching and scratching. Previously arms, body.  Pt states Clindamycin improves symptoms, but not completely. And has used Triamcinolone, but not using much. Pt states she's still has itchiness.    Reports chair acquired at [employer] may have brought bed bugs into home. House treated a couple times without improvement in symptoms and \"last  said there may not even have been bugs in the first place.\"  Used allegra for a while, but didn't help.     Has leg swelling and weight gain, 10 pounds in recent week. Is going to  medication to help reduce water weight soon.    Had admission recently with 40 pound weight gain, but preceding that had substantial weight loss.     Reports past gallbladder problems and removal.   Has GI scope procedure scheduled for November.  Reports cancer screening up to date but no paps since hysterectomy    Seen at [place]\"    History of skin cancer: No  History of precancers/actinic keratoses: No  History of biopsies:Yes, Details: lower leg Bx done, results benign   GA of wrist 2022  History of blistering/severe sunburns:No  Family history of skin cancer:No     PMH: " T1DM, polyneuropathy, thyroid dz, elevated liver enzymes/alk phos  Social: smoker    EXAM:Gen: WDWN female in NAD. Slender build. Face and arms with marks of excoriation.      Imaging:   Nov 2022:  FINDINGS:  Visualized lung bases show subpleural nodule in the LEFT lower lobe laterally measuring 5 mm.  Pancreas shows no discrete mass.  Common bile duct measures 6 mm in diameter.  Gallbladder is absent.  Liver is mildly prominent measuring 17.3 cm at the RIGHT lobe.  The spleen is unremarkable.  Adrenal glands are unremarkable.  The kidneys are unremarkable.  Mildly prominent LEFT retroperitoneal lymph node located just below the LEFT renal vein measuring 15 mm short axis.  Increased colonic stool.  No evidence of bowel obstruction.  No peritoneal fluid or pneumoperitoneum.  Moderate atherosclerotic calcification abdominal aorta.  Degenerative change of lumbar spine with dextroconvex curvature.     IMPRESSION:     1.  No discrete pancreatic mass.  2.  Mildly enlarged LEFT retroperitoneal lymph node, nonspecific.  3.  Increased colonic stool.  4.  Prior cholecystectomy.  5.  No significant biliary dilation.    Labs: elevated glucose >100 most labs in recent 8 months; HgbA1C 10.8  CEA - trending up 7.7  Ca19-9 - trending up 748    Persistent transaminitis and alk phos elevation in recent 8 months; trending up  TSH>13-15 multiple labs in last 8 months, highest 50+. Below normal per labs Nov 2022  Quantiferon neg  Allergen Ascaris - WNL  Sed rate trending up = 107 Oct 2022  Ace mildly elevated Oct 2022  LDH trending up    IMPRESSION / PLAN:  1. Pruritus/GA, consider possible causes from underlying systemic dz/AI?: elevated transminitis, alk phos, thyroid dz/DM as likely: had previously messaged [facility] - GI/Endocrine/PCP for close f/u with notable lab abnormalities  -cont GI f/u / W/u.   -sent article to patient to review with MDs - https://www.ncbi.nlm.nih.gov/pmc/articles/QSU1140641/    -discussed strategies to help get  needed Rx with patient.    -Betamethasone cream BID PRN body/hands  -TAC cream BID PRN less affected sites  -to message if refills needed    F/u 2 months/PRN

## 2022-12-23 ENCOUNTER — TELEPHONE (OUTPATIENT)
Dept: DERMATOLOGY | Facility: IMAGING CENTER | Age: 67
End: 2022-12-23

## 2022-12-23 NOTE — TELEPHONE ENCOUNTER
Pt requesting antibiotics, she states the rash she came in for is worse and now has spread to her scalp, she says it's happened to her before and had to go to ER and they lanced and drained the lesions?   She states her PCP did not feel comfortable giving her full dose of antibiotics she stated to reach out to her Derm.

## 2022-12-28 ENCOUNTER — HOSPITAL ENCOUNTER (OUTPATIENT)
Dept: RADIOLOGY | Facility: MEDICAL CENTER | Age: 67
End: 2022-12-28
Attending: NURSE PRACTITIONER
Payer: MEDICARE

## 2022-12-28 DIAGNOSIS — R91.1 LUNG NODULE: ICD-10-CM

## 2022-12-28 PROCEDURE — 71250 CT THORAX DX C-: CPT

## 2022-12-30 ENCOUNTER — OFFICE VISIT (OUTPATIENT)
Dept: DERMATOLOGY | Facility: IMAGING CENTER | Age: 67
End: 2022-12-30
Payer: MEDICARE

## 2022-12-30 DIAGNOSIS — L28.1 PRURIGO NODULARIS: ICD-10-CM

## 2022-12-30 DIAGNOSIS — T14.8XXA ABRASION: ICD-10-CM

## 2022-12-30 DIAGNOSIS — G62.9 NEUROPATHY: ICD-10-CM

## 2022-12-30 PROCEDURE — 99213 OFFICE O/P EST LOW 20 MIN: CPT | Performed by: DERMATOLOGY

## 2022-12-30 RX ORDER — MUPIROCIN CALCIUM 20 MG/G
CREAM TOPICAL
Qty: 30 G | Refills: 2 | Status: SHIPPED | OUTPATIENT
Start: 2022-12-30 | End: 2023-03-20

## 2022-12-30 NOTE — PROGRESS NOTES
"CC: rash / itch scalp    Subjective: patient here for rash of scalp, it's itchy and redness.   Pt has been applying Betamethasone and has some relief.    Site on scalp for which she messaged about needing oral ABX last week has improved.   Few new itchy spots on scalp  Scratched site on right heel with slow healing since injury.  Keeping clean and covered.      PREV VISIT 12/02/2022:  \"Using betamethasone cream which really helps. TAC cream - has a large supply. Topical clindamycin solution hasn't helped a lot.    Getting w/u with Dr. Rita ESTES with plans to send to Liver doctor at [place]. Unclear what origin of  may be.     Patient tearful today - received large heating bill, needs to put dog down. Forgot phone today. Continues to be worried by state of health and notable increase in . Frustrated that she cannot get gut rx when it had helped in the past.\"     Prior note:   \"Pt here today for itching of arms as primary complaint. Has hx of several months of itching and scratching. Previously arms, body.  Pt states Clindamycin improves symptoms, but not completely. And has used Triamcinolone, but not using much. Pt states she's still has itchiness.    Reports chair acquired at [employer] may have brought bed bugs into home. House treated a couple times without improvement in symptoms and \"last  said there may not even have been bugs in the first place.\"  Used allegra for a while, but didn't help.     Has leg swelling and weight gain, 10 pounds in recent week. Is going to  medication to help reduce water weight soon.    Had admission recently with 40 pound weight gain, but preceding that had substantial weight loss.     Reports past gallbladder problems and removal.   Has GI scope procedure scheduled for November.  Reports cancer screening up to date but no paps since hysterectomy    Seen at [place]\"    History of skin cancer: No  History of precancers/actinic keratoses: " No  History of biopsies:Yes, Details: lower leg Bx done, results benign   GA of wrist 2022  History of blistering/severe sunburns:No  Family history of skin cancer:No     ROS: numbness, burning of distal extremities, itching. No fevers/chills. No cough.  PMH: T1DM, polyneuropathy, thyroid dz, elevated liver enzymes/alk phos  Social: smoker    EXAM:Gen: WDWN female in NAD. Slender build. Skin: scalp - scattered excoriated papules on scalp.  Right heel with linear crust without notable erythema/swelling surrounding.  Dilated vessel ectasias surrounding    IMPRESSION / PLAN:  1. Pruritus/prurigo nodularis, scalp, consider possible causes from underlying systemic dz/AI?: elevated transminitis, alk phos, thyroid dz/DM as likely: had previously messaged MDs - GI/Endocrine/PCP for close f/u with notable lab abnormalities  -mupirocin cream BID ---> PRN  -Betamethasone cream BID PRN itching  -rec wound culture if future abscess, none noted today    Neuropathy and injury to heel, without evidence of infection today:  -reviewed ABX stewardship  -reviewed gentle wound care  -sarna with menthol  -declined gabapentin  -rec f/u neurology    -anticipatory guidance for both conditions    Ongoing management diabetes/thyroid disease: endocrine/PCP    F/u PRN

## 2023-01-17 DIAGNOSIS — L29.9 PRURITUS: ICD-10-CM

## 2023-01-19 RX ORDER — TRIAMCINOLONE ACETONIDE 1 MG/G
CREAM TOPICAL
Qty: 454 G | Refills: 1 | Status: SHIPPED | OUTPATIENT
Start: 2023-01-19 | End: 2023-03-17

## 2023-01-25 ENCOUNTER — TELEPHONE (OUTPATIENT)
Dept: ENDOCRINOLOGY | Facility: MEDICAL CENTER | Age: 68
End: 2023-01-25

## 2023-01-25 NOTE — TELEPHONE ENCOUNTER
Hi Dr Paz,     I tried to process her Dexcom G6 Monitoring Package via [employer] and this is the message from oscar:      Michelle Zhao   Oscar (Diabetes 1)  1/23 ? 1:39PM  @Renata Nix and thank you for the clinical notes received. Unfortunately, the clinical notes dated 8/19/22 show that the patient is noncompliant and skipping insulin doses. The patient's insurance wouldn't be able to qualify the patient for the CGM with her being noncompliant and skipping insulin doses. Is there another appointment's notes you could send? Or does the provider know what current regimen she is using/if she's being compliant and would want to make an addendum to the clinical notes? Addendums need to be clearly indicated as such with an updated signature and date. In order to meet the insurance's medical policy the patient would need to administer insulin multiple times per day as well as using a frequent adjustment to her insulin on the basis of her BG results. Let me know if you have any questions, thank you!    I did try to send the notes from her visit on 11/29/2022 with [facility] unfortunately the patient's insurance won't allow to use clinical notes from an appointment with an RN/CDE to qualify the patient for the CGM. They require an appointment (within the past 6 months) with a valid provider (MD/DO, NP, PA, CNS, etc) and will not accept any appointments from RN, CDE, PharmD, RPH, LPN, etc.

## 2023-01-30 ENCOUNTER — HOSPITAL ENCOUNTER (OUTPATIENT)
Dept: LAB | Facility: MEDICAL CENTER | Age: 68
End: 2023-01-30
Attending: NURSE PRACTITIONER
Payer: MEDICARE

## 2023-01-30 LAB
25(OH)D3 SERPL-MCNC: 30 NG/ML (ref 30–100)
APPEARANCE UR: CLEAR
BACTERIA #/AREA URNS HPF: NEGATIVE /HPF
BILIRUB UR QL STRIP.AUTO: NEGATIVE
COLOR UR: YELLOW
CREAT UR-MCNC: 25.1 MG/DL
EPI CELLS #/AREA URNS HPF: NEGATIVE /HPF
GLUCOSE UR STRIP.AUTO-MCNC: >=1000 MG/DL
HYALINE CASTS #/AREA URNS LPF: ABNORMAL /LPF
IRON SATN MFR SERPL: 25 % (ref 15–55)
IRON SERPL-MCNC: 72 UG/DL (ref 40–170)
KETONES UR STRIP.AUTO-MCNC: NEGATIVE MG/DL
LEUKOCYTE ESTERASE UR QL STRIP.AUTO: NEGATIVE
MAGNESIUM SERPL-MCNC: 1.9 MG/DL (ref 1.5–2.5)
MICRO URNS: ABNORMAL
MICROALBUMIN UR-MCNC: 132.1 MG/DL
MICROALBUMIN/CREAT UR: 5263 MG/G (ref 0–30)
NITRITE UR QL STRIP.AUTO: NEGATIVE
PH UR STRIP.AUTO: 6.5 [PH] (ref 5–8)
PHOSPHATE SERPL-MCNC: 3.8 MG/DL (ref 2.5–4.5)
PROT UR QL STRIP: 300 MG/DL
RBC # URNS HPF: ABNORMAL /HPF
RBC UR QL AUTO: ABNORMAL
SP GR UR STRIP.AUTO: 1.02
T3FREE SERPL-MCNC: 2.54 PG/ML (ref 2–4.4)
TIBC SERPL-MCNC: 285 UG/DL (ref 250–450)
TSH SERPL DL<=0.005 MIU/L-ACNC: 0.15 UIU/ML (ref 0.38–5.33)
UIBC SERPL-MCNC: 213 UG/DL (ref 110–370)
UROBILINOGEN UR STRIP.AUTO-MCNC: 0.2 MG/DL
WBC #/AREA URNS HPF: ABNORMAL /HPF

## 2023-01-30 PROCEDURE — 83540 ASSAY OF IRON: CPT

## 2023-01-30 PROCEDURE — 83735 ASSAY OF MAGNESIUM: CPT

## 2023-01-30 PROCEDURE — 82306 VITAMIN D 25 HYDROXY: CPT

## 2023-01-30 PROCEDURE — 84443 ASSAY THYROID STIM HORMONE: CPT

## 2023-01-30 PROCEDURE — 82043 UR ALBUMIN QUANTITATIVE: CPT

## 2023-01-30 PROCEDURE — 82570 ASSAY OF URINE CREATININE: CPT

## 2023-01-30 PROCEDURE — 84481 FREE ASSAY (FT-3): CPT

## 2023-01-30 PROCEDURE — 84100 ASSAY OF PHOSPHORUS: CPT

## 2023-01-30 PROCEDURE — 81001 URINALYSIS AUTO W/SCOPE: CPT

## 2023-01-30 PROCEDURE — 36415 COLL VENOUS BLD VENIPUNCTURE: CPT

## 2023-01-30 PROCEDURE — 84439 ASSAY OF FREE THYROXINE: CPT

## 2023-01-30 PROCEDURE — 83550 IRON BINDING TEST: CPT

## 2023-02-03 ENCOUNTER — OFFICE VISIT (OUTPATIENT)
Dept: DERMATOLOGY | Facility: IMAGING CENTER | Age: 68
End: 2023-02-03
Payer: MEDICARE

## 2023-02-03 DIAGNOSIS — L28.1 PRURIGO NODULARIS: ICD-10-CM

## 2023-02-03 DIAGNOSIS — L70.5 ACNE EXCORIEE: ICD-10-CM

## 2023-02-03 PROCEDURE — 99213 OFFICE O/P EST LOW 20 MIN: CPT | Mod: 25 | Performed by: DERMATOLOGY

## 2023-02-03 PROCEDURE — 11900 INJECT SKIN LESIONS </W 7: CPT | Performed by: DERMATOLOGY

## 2023-02-03 RX ORDER — PIMECROLIMUS 10 MG/G
CREAM TOPICAL
Qty: 30 G | Refills: 1 | Status: SHIPPED | OUTPATIENT
Start: 2023-02-03 | End: 2023-03-20

## 2023-02-03 NOTE — PROGRESS NOTES
"CC: rash / itch scalp    Subjective: patient here for itching spots on face/arms.     Reports no recent flare, just \"is what they are\"  Itches a lot.   Seen by liver specialist at [facility] and awaiting liver labs for possible next steps in treatment.     Does pick at sites on face.   Reports using medicine in tub, which helps.    PREV VISIT 12/02/2022:  \"Using betamethasone cream which really helps. TAC cream - has a large supply. Topical clindamycin solution hasn't helped a lot.    Getting w/u with Dr. Rita ESTES with plans to send to Liver doctor at [facility]. Unclear what origin of  may be.     Patient tearful today - received large heating bill, needs to put dog down. Forgot phone today. Continues to be worried by state of health and notable increase in . Frustrated that she cannot get gut rx when it had helped in the past.\"     Prior note:   \"Pt here today for itching of arms as primary complaint. Has hx of several months of itching and scratching. Previously arms, body.  Pt states Clindamycin improves symptoms, but not completely. And has used Triamcinolone, but not using much. Pt states she's still has itchiness.    Reports chair acquired at [employer] may have brought bed bugs into home. House treated a couple times without improvement in symptoms and \"last  said there may not even have been bugs in the first place.\"  Used allegra for a while, but didn't help.     Has leg swelling and weight gain, 10 pounds in recent week. Is going to  medication to help reduce water weight soon.    Had admission recently with 40 pound weight gain, but preceding that had substantial weight loss.     Reports past gallbladder problems and removal.   Has GI scope procedure scheduled for November.  Reports cancer screening up to date but no paps since hysterectomy    Seen at [facility]\"    History of skin cancer: No  History of precancers/actinic keratoses: No  History of biopsies:Yes, Details: lower " leg Bx done, results benign   GA of wrist 2022  History of blistering/severe sunburns:No  Family history of skin cancer:No     ROS: numbness, burning of distal extremities, itching. No fevers/chills. No cough.  PMH: T1DM, polyneuropathy, thyroid dz, elevated liver enzymes/alk phos  Social: smoker    EXAM:Gen: WDWN female in NAD. Slender build. Skin: excoriated papules/papulonodules on face. Scattered excoriated papules, linear marks of excoriation on arms.     IMPRESSION / PLAN:  1. Pruritus/prurigo nodularis, arms   , consider possible causes from underlying systemic dz/AI?: elevated transminitis, alk phos, thyroid dz/DM as likely: had previously messaged MDs - GI/Endocrine/PCP for close f/u with notable lab abnormalities. Cont liver f/u.  -mupirocin cream BID ---> PRN  -Betamethasone cream BID PRN itching  -trial treat IL tac today - 4 sites - 2 each on left /right arms. Sites cleaned with EtOH after R/b/A reviewed. Injected 0.25ml of 7.5mg/ml of each site. Sites bandaged. PA completed today  -f/u 6-8 weeks    Acne excoriee face  -advised against picking  -trial elidel cream BID PRN  -cont mupirocin cream on open sores  -minimal steroid exposure on face      Ongoing management diabetes/thyroid disease: endocrine/PCP

## 2023-02-04 LAB — T4 FREE SERPL DIALY-MCNC: 3.7 NG/DL (ref 1.1–2.4)

## 2023-02-21 ENCOUNTER — HOSPITAL ENCOUNTER (EMERGENCY)
Facility: MEDICAL CENTER | Age: 68
End: 2023-02-21
Attending: EMERGENCY MEDICINE
Payer: MEDICARE

## 2023-02-21 VITALS
HEART RATE: 79 BPM | SYSTOLIC BLOOD PRESSURE: 154 MMHG | BODY MASS INDEX: 19.88 KG/M2 | DIASTOLIC BLOOD PRESSURE: 97 MMHG | WEIGHT: 123.68 LBS | TEMPERATURE: 98.7 F | HEIGHT: 66 IN | OXYGEN SATURATION: 97 % | RESPIRATION RATE: 18 BRPM

## 2023-02-21 DIAGNOSIS — E86.0 DEHYDRATION: ICD-10-CM

## 2023-02-21 DIAGNOSIS — R79.89 ABNORMAL LIVER FUNCTION TESTS: ICD-10-CM

## 2023-02-21 DIAGNOSIS — R11.2 NAUSEA AND VOMITING, UNSPECIFIED VOMITING TYPE: ICD-10-CM

## 2023-02-21 DIAGNOSIS — K74.3 PRIMARY BILIARY CIRRHOSIS (HCC): ICD-10-CM

## 2023-02-21 LAB
ALBUMIN SERPL BCP-MCNC: 3.2 G/DL (ref 3.2–4.9)
ALBUMIN/GLOB SERPL: 0.8 G/DL
ALP SERPL-CCNC: 768 U/L (ref 30–99)
ALT SERPL-CCNC: 61 U/L (ref 2–50)
AMMONIA PLAS-SCNC: 24 UMOL/L (ref 11–45)
ANION GAP SERPL CALC-SCNC: 11 MMOL/L (ref 7–16)
AST SERPL-CCNC: 61 U/L (ref 12–45)
BASOPHILS # BLD AUTO: 1.4 % (ref 0–1.8)
BASOPHILS # BLD: 0.14 K/UL (ref 0–0.12)
BILIRUB SERPL-MCNC: 2.6 MG/DL (ref 0.1–1.5)
BUN SERPL-MCNC: 22 MG/DL (ref 8–22)
CALCIUM ALBUM COR SERPL-MCNC: 9 MG/DL (ref 8.5–10.5)
CALCIUM SERPL-MCNC: 8.4 MG/DL (ref 8.4–10.2)
CHLORIDE SERPL-SCNC: 103 MMOL/L (ref 96–112)
CO2 SERPL-SCNC: 24 MMOL/L (ref 20–33)
CREAT SERPL-MCNC: 1.14 MG/DL (ref 0.5–1.4)
EOSINOPHIL # BLD AUTO: 0.23 K/UL (ref 0–0.51)
EOSINOPHIL NFR BLD: 2.3 % (ref 0–6.9)
ERYTHROCYTE [DISTWIDTH] IN BLOOD BY AUTOMATED COUNT: 47.9 FL (ref 35.9–50)
GFR SERPLBLD CREATININE-BSD FMLA CKD-EPI: 53 ML/MIN/1.73 M 2
GLOBULIN SER CALC-MCNC: 3.9 G/DL (ref 1.9–3.5)
GLUCOSE SERPL-MCNC: 104 MG/DL (ref 65–99)
HCT VFR BLD AUTO: 35.1 % (ref 37–47)
HGB BLD-MCNC: 11.7 G/DL (ref 12–16)
IMM GRANULOCYTES # BLD AUTO: 0.06 K/UL (ref 0–0.11)
IMM GRANULOCYTES NFR BLD AUTO: 0.6 % (ref 0–0.9)
LIPASE SERPL-CCNC: 8 U/L (ref 7–58)
LYMPHOCYTES # BLD AUTO: 1.42 K/UL (ref 1–4.8)
LYMPHOCYTES NFR BLD: 14.5 % (ref 22–41)
MCH RBC QN AUTO: 31.1 PG (ref 27–33)
MCHC RBC AUTO-ENTMCNC: 33.3 G/DL (ref 33.6–35)
MCV RBC AUTO: 93.4 FL (ref 81.4–97.8)
MONOCYTES # BLD AUTO: 0.94 K/UL (ref 0–0.85)
MONOCYTES NFR BLD AUTO: 9.6 % (ref 0–13.4)
NEUTROPHILS # BLD AUTO: 7 K/UL (ref 2–7.15)
NEUTROPHILS NFR BLD: 71.6 % (ref 44–72)
NRBC # BLD AUTO: 0 K/UL
NRBC BLD-RTO: 0 /100 WBC
PLATELET # BLD AUTO: 431 K/UL (ref 164–446)
PMV BLD AUTO: 10.6 FL (ref 9–12.9)
POTASSIUM SERPL-SCNC: 3.8 MMOL/L (ref 3.6–5.5)
PROT SERPL-MCNC: 7.1 G/DL (ref 6–8.2)
RBC # BLD AUTO: 3.76 M/UL (ref 4.2–5.4)
SODIUM SERPL-SCNC: 138 MMOL/L (ref 135–145)
WBC # BLD AUTO: 9.8 K/UL (ref 4.8–10.8)

## 2023-02-21 PROCEDURE — 700105 HCHG RX REV CODE 258: Performed by: EMERGENCY MEDICINE

## 2023-02-21 PROCEDURE — 83690 ASSAY OF LIPASE: CPT

## 2023-02-21 PROCEDURE — 96374 THER/PROPH/DIAG INJ IV PUSH: CPT

## 2023-02-21 PROCEDURE — 700111 HCHG RX REV CODE 636 W/ 250 OVERRIDE (IP): Performed by: EMERGENCY MEDICINE

## 2023-02-21 PROCEDURE — 85025 COMPLETE CBC W/AUTO DIFF WBC: CPT

## 2023-02-21 PROCEDURE — 99284 EMERGENCY DEPT VISIT MOD MDM: CPT

## 2023-02-21 PROCEDURE — 36415 COLL VENOUS BLD VENIPUNCTURE: CPT

## 2023-02-21 PROCEDURE — 80053 COMPREHEN METABOLIC PANEL: CPT

## 2023-02-21 PROCEDURE — 82140 ASSAY OF AMMONIA: CPT

## 2023-02-21 RX ORDER — ONDANSETRON 2 MG/ML
4 INJECTION INTRAMUSCULAR; INTRAVENOUS ONCE
Status: COMPLETED | OUTPATIENT
Start: 2023-02-21 | End: 2023-02-21

## 2023-02-21 RX ORDER — SODIUM CHLORIDE 9 MG/ML
1000 INJECTION, SOLUTION INTRAVENOUS ONCE
Status: COMPLETED | OUTPATIENT
Start: 2023-02-21 | End: 2023-02-21

## 2023-02-21 RX ADMIN — SODIUM CHLORIDE 1000 ML: 9 INJECTION, SOLUTION INTRAVENOUS at 15:57

## 2023-02-21 RX ADMIN — ONDANSETRON 4 MG: 2 INJECTION INTRAMUSCULAR; INTRAVENOUS at 15:58

## 2023-02-21 ASSESSMENT — FIBROSIS 4 INDEX: FIB4 SCORE: 0.99

## 2023-02-21 NOTE — ED TRIAGE NOTES
Anu Manuel  67 y.o.  Chief Complaint   Patient presents with    N/V     Pt ambulatory to triage with c/o N/V since Friday.  Pt is being seen at [facility] for a liver problem.  Pt was instructed to come to the ER by [facility] Staff.    Dr. Harp is her physician @ [facility].

## 2023-02-21 NOTE — ED PROVIDER NOTES
ED Provider Note    CHIEF COMPLAINT  Chief Complaint   Patient presents with    N/V       EXTERNAL RECORDS REVIEWED  Outpatient Notes liver specialist evaluation on February 1, 2023 and Outpatient labs & studies baseline bilirubin is 0.6    HPI/ROS  LIMITATION TO HISTORY   Select: : None  OUTSIDE HISTORIAN(S):  None    Anu Manuel is a 67 y.o. female who presents with complaint of vomiting most oral intake over the past 5 days.  Symptoms gradual onset at home.  No associated abdominal pain.  Patient states she has had liver problems, she believes related to autoimmune disorder, the past 2 years.  She has a specialist at [facility] for this.  She contacted her specialist and was referred to the emergency department for evaluation secondary to vomiting.  She feels a slightly improved today, able to eat and piece of licorice prior to arrival to the ER without vomiting this.  She states several times and vomited most of her medication.  She has had chronic loose stool, no change with this.  No hematemesis or bloody stool.  No fever.  She feels her eyes are slightly yellow.  She does have history of jaundice once last year.  She has had a chronic rash for 8 months she states related to her liver problems.        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    SURGICAL HISTORY   has a past surgical history that includes hysterectomy,  "vaginal (2006); thyroid lobectomy (1973); lumpectomy (1976, 2005); cervical disk and fusion anterior (03/12/08); tonsillectomy (1963); cervical fusion posterior (1/16/2009); hardware removal neuro (1/16/2009); neck exploration (1/16/2009); abdominal hysterectomy total; lumpectomy; lumbar laminectomy diskectomy (Right, 5/10/2016); shoulder decompression arthroscopic (6/17/08); clavicle distal excision (6/17/08); shoulder arthroscopy w/ rotator cuff repair (10/9/08); njx aa&/strd tfrml epi lumbar/sacral 1 level (Right, 8/31/2016); spinal cord stimulator (N/A, 10/26/2018); thoracic laminectomy (N/A, 10/26/2018); appendectomy (2004); implant neurostim/ (N/A, 12/16/2019); irrigation & debridement neuro (1/19/2020); cath placement (1/25/2020); ercp,diagnostic (N/A, 10/26/2021); upper gi endoscopy,biopsy (N/A, 10/26/2021); upper gi endoscopy,diagnosis (N/A, 10/26/2021); peter by laparoscopy (N/A, 10/28/2021); and ercp,diagnostic (N/A, 1/14/2022).    FAMILY HISTORY  Family History   Problem Relation Age of Onset    Hypertension Mother     Cancer Father        SOCIAL HISTORY  Social History     Tobacco Use    Smoking status: Every Day     Packs/day: 0.50     Years: 30.00     Pack years: 15.00     Types: Cigarettes    Smokeless tobacco: Never   Vaping Use    Vaping Use: Never used   Substance and Sexual Activity    Alcohol use: Not Currently    Drug use: Yes     Comment: Marijuana - Rarely    Sexual activity: Not on file       CURRENT MEDICATIONS  Home Medications    **Home medications have not yet been reviewed for this encounter**         ALLERGIES  Allergies   Allergen Reactions    Tape Unspecified     Blisters, paper tape is ok       PHYSICAL EXAM  VITAL SIGNS: BP (!) 141/82   Pulse 89   Temp 36.5 °C (97.7 °F) (Temporal)   Resp 16   Ht 1.676 m (5' 6\")   Wt 56.1 kg (123 lb 10.9 oz)   LMP 04/29/2005 (LMP Unknown)   SpO2 98%   BMI 19.96 kg/m²    Constitutional: No acute distress  Eyes: No scleral " icterus  ENT: Dry mucous membranes  Cardiac: Heart regular, no murmur  GI: Mild distention, patient states this is usual for her.  No tenderness  Respiratory: Clear lung sounds, good air movement  Neurologic: Sensation intact.  No asterixis  Skin: No jaundice.  Scattered maculopapular erythematous rash which patient states is chronic.  No pustules    DIAGNOSTIC STUDIES / PROCEDURES      LABS  Results for orders placed or performed during the hospital encounter of 02/21/23   CBC WITH DIFFERENTIAL   Result Value Ref Range    WBC 9.8 4.8 - 10.8 K/uL    RBC 3.76 (L) 4.20 - 5.40 M/uL    Hemoglobin 11.7 (L) 12.0 - 16.0 g/dL    Hematocrit 35.1 (L) 37.0 - 47.0 %    MCV 93.4 81.4 - 97.8 fL    MCH 31.1 27.0 - 33.0 pg    MCHC 33.3 (L) 33.6 - 35.0 g/dL    RDW 47.9 35.9 - 50.0 fL    Platelet Count 431 164 - 446 K/uL    MPV 10.6 9.0 - 12.9 fL    Neutrophils-Polys 71.60 44.00 - 72.00 %    Lymphocytes 14.50 (L) 22.00 - 41.00 %    Monocytes 9.60 0.00 - 13.40 %    Eosinophils 2.30 0.00 - 6.90 %    Basophils 1.40 0.00 - 1.80 %    Immature Granulocytes 0.60 0.00 - 0.90 %    Nucleated RBC 0.00 /100 WBC    Neutrophils (Absolute) 7.00 2.00 - 7.15 K/uL    Lymphs (Absolute) 1.42 1.00 - 4.80 K/uL    Monos (Absolute) 0.94 (H) 0.00 - 0.85 K/uL    Eos (Absolute) 0.23 0.00 - 0.51 K/uL    Baso (Absolute) 0.14 (H) 0.00 - 0.12 K/uL    Immature Granulocytes (abs) 0.06 0.00 - 0.11 K/uL    NRBC (Absolute) 0.00 K/uL   COMP METABOLIC PANEL   Result Value Ref Range    Sodium 138 135 - 145 mmol/L    Potassium 3.8 3.6 - 5.5 mmol/L    Chloride 103 96 - 112 mmol/L    Co2 24 20 - 33 mmol/L    Anion Gap 11.0 7.0 - 16.0    Glucose 104 (H) 65 - 99 mg/dL    Bun 22 8 - 22 mg/dL    Creatinine 1.14 0.50 - 1.40 mg/dL    Calcium 8.4 8.4 - 10.2 mg/dL    AST(SGOT) 61 (H) 12 - 45 U/L    ALT(SGPT) 61 (H) 2 - 50 U/L    Alkaline Phosphatase 768 (H) 30 - 99 U/L    Total Bilirubin 2.6 (H) 0.1 - 1.5 mg/dL    Albumin 3.2 3.2 - 4.9 g/dL    Total Protein 7.1 6.0 - 8.2 g/dL     Globulin 3.9 (H) 1.9 - 3.5 g/dL    A-G Ratio 0.8 g/dL   LIPASE   Result Value Ref Range    Lipase 8 7 - 58 U/L   AMMONIA   Result Value Ref Range    Ammonia 24 11 - 45 umol/L   CORRECTED CALCIUM   Result Value Ref Range    Correct Calcium 9.0 8.5 - 10.5 mg/dL   ESTIMATED GFR   Result Value Ref Range    GFR (CKD-EPI) 53 (A) >60 mL/min/1.73 m 2           COURSE & MEDICAL DECISION MAKING    ED Observation Status? Yes; I am placing the patient in to an observation status due to a diagnostic uncertainty as well as therapeutic intensity. Patient placed in observation status at 3:27 PM, 2/21/2023.     Observation plan is as follows: Serial abdominal exam, laboratory evaluation, IV hydration and reassessment of hemodynamic status    Upon Reevaluation, the patient's condition has: Improved; and will be discharged.    Patient discharged from ED Observation status at 5 PM (Time) February 21, 2023 (Date).     INITIAL ASSESSMENT, COURSE AND PLAN  Care Narrative: Patient presents with nausea and vomiting over the past 5 days, somewhat improved today.  Patient was treated with IV Zofran, states nausea resolved and she is now able to drink water without vomiting.  Patient received IV fluids secondary to evidence of dehydration and looks better at this time.  Repeat abdominal exam shows abdomen to be soft and nontender with mild distention as before.  Patient does have elevation of her bilirubin in comparison to baseline, other liver function test appears slightly improved.  Patient states she would like to go home as she feels improved, she has agreed to call her liver specialist tomorrow for evaluation.  I recommended she follow-up for repeat lab testing in approximately 1 week to reassess bilirubin elevation.  She is agreeable to return for reevaluation should she develop abdominal pain, jaundice, fever or any concerns.  HYDRATION: Based on the patient's presentation of Acute Vomiting and Dehydration the patient was given IV  fluids. IV Hydration was used because oral hydration was not adequate alone. Upon recheck following hydration, the patient was improved.      ADDITIONAL PROBLEM LIST  Dehydration: Treated with Zofran successfully, given IV hydration, now tolerating oral intake    Vomiting: Improved with Zofran    Liver dysfunction: Mild increase in bilirubin, patient states she will follow-up with her liver specialist soon as possible.  Patient agreeable to returning if worse or for any concerns    Mild anemia: Similar to previous.  Here she is hemodynamically stable.      DISPOSITION AND DISCUSSIONS    Escalation of care considered, and ultimately not performed:after discussion with the patient / family, they have elected to decline an escalation in care    Barriers to care at this time, including but not limited to:  None .     Decision tools and prescription drugs considered including, but not limited to:  I consider changing her antiemetic medication as Zofran had not been working yesterday however today it seems to have improved her condition and she would like to continue taking Zofran if needed. .    FINAL DIAGNOSIS  1. Nausea and vomiting, unspecified vomiting type    2. Abnormal liver function tests    3. Primary biliary cirrhosis (HCC)    4. Dehydration           Electronically signed by: Jesus Snyder M.D., 2/21/2023 3:26 PM

## 2023-02-22 ENCOUNTER — TELEPHONE (OUTPATIENT)
Dept: ENDOCRINOLOGY | Facility: MEDICAL CENTER | Age: 68
End: 2023-02-22

## 2023-02-22 DIAGNOSIS — E10.9 TYPE 1 DIABETES MELLITUS ON INSULIN THERAPY (HCC): ICD-10-CM

## 2023-02-22 RX ORDER — FLASH GLUCOSE SENSOR
1 KIT MISCELLANEOUS
Qty: 6 EACH | Refills: 1 | Status: SHIPPED | OUTPATIENT
Start: 2023-02-22 | End: 2023-04-04

## 2023-02-22 NOTE — ED NOTES
Pt tolerated PO fluids well  OOB and ambulated strong and steady to B RP  MD re-evaluated pt and discussion of results given

## 2023-02-22 NOTE — TELEPHONE ENCOUNTER
Patient called because pharmacy is saying we  canceled her freestyle sensor. She would like to know what is going on.

## 2023-02-22 NOTE — ED NOTES
Pt cleared for d/c  dischg instructions given to pt  verbally understands  d/c'ed to home in NAD  instructed to f/u w/ PCP, she verbally understands

## 2023-02-22 NOTE — DISCHARGE INSTRUCTIONS
Please follow-up with your specialist Dr. Howard, call tomorrow.  Please return if worse, for jaundice, uncontrollable vomiting or any concerns.  Use prescribed Zofran as needed for nausea control

## 2023-03-02 ENCOUNTER — HOME HEALTH ADMISSION (OUTPATIENT)
Dept: HOME HEALTH SERVICES | Facility: HOME HEALTHCARE | Age: 68
End: 2023-03-02
Payer: MEDICARE

## 2023-03-02 DIAGNOSIS — E10.9 TYPE 1 DIABETES MELLITUS ON INSULIN THERAPY (HCC): ICD-10-CM

## 2023-03-02 RX ORDER — INSULIN GLARGINE 300 U/ML
24 INJECTION, SOLUTION SUBCUTANEOUS
Qty: 4.5 ML | Refills: 6 | Status: SHIPPED | OUTPATIENT
Start: 2023-03-02 | End: 2023-04-04 | Stop reason: SDUPTHER

## 2023-03-03 ENCOUNTER — HOSPITAL ENCOUNTER (OUTPATIENT)
Dept: LAB | Facility: MEDICAL CENTER | Age: 68
End: 2023-03-03
Attending: NURSE PRACTITIONER
Payer: MEDICARE

## 2023-03-03 ENCOUNTER — HOSPITAL ENCOUNTER (OUTPATIENT)
Dept: LAB | Facility: MEDICAL CENTER | Age: 68
End: 2023-03-03
Attending: INTERNAL MEDICINE
Payer: MEDICARE

## 2023-03-03 LAB
25(OH)D3 SERPL-MCNC: 36 NG/ML (ref 30–100)
ALBUMIN SERPL BCP-MCNC: 3.6 G/DL (ref 3.2–4.9)
ALBUMIN SERPL BCP-MCNC: 3.7 G/DL (ref 3.2–4.9)
ALBUMIN/GLOB SERPL: 1 G/DL
ALP SERPL-CCNC: 1076 U/L (ref 30–99)
ALP SERPL-CCNC: 1080 U/L (ref 30–99)
ALT SERPL-CCNC: 80 U/L (ref 2–50)
ALT SERPL-CCNC: 81 U/L (ref 2–50)
AMORPH CRY #/AREA URNS HPF: PRESENT /HPF
ANION GAP SERPL CALC-SCNC: 13 MMOL/L (ref 7–16)
APPEARANCE UR: CLEAR
AST SERPL-CCNC: 68 U/L (ref 12–45)
AST SERPL-CCNC: 74 U/L (ref 12–45)
BACTERIA #/AREA URNS HPF: ABNORMAL /HPF
BASOPHILS # BLD AUTO: 2.1 % (ref 0–1.8)
BASOPHILS # BLD: 0.16 K/UL (ref 0–0.12)
BILIRUB CONJ SERPL-MCNC: 2 MG/DL (ref 0.1–0.5)
BILIRUB INDIRECT SERPL-MCNC: 0.7 MG/DL (ref 0–1)
BILIRUB SERPL-MCNC: 2.7 MG/DL (ref 0.1–1.5)
BILIRUB SERPL-MCNC: 2.7 MG/DL (ref 0.1–1.5)
BILIRUB UR QL STRIP.AUTO: ABNORMAL
BUN SERPL-MCNC: 26 MG/DL (ref 8–22)
CALCIUM ALBUM COR SERPL-MCNC: 9.3 MG/DL (ref 8.5–10.5)
CALCIUM SERPL-MCNC: 9.1 MG/DL (ref 8.5–10.5)
CHLORIDE SERPL-SCNC: 103 MMOL/L (ref 96–112)
CHOLEST SERPL-MCNC: 312 MG/DL (ref 100–199)
CO2 SERPL-SCNC: 21 MMOL/L (ref 20–33)
COLOR UR: ABNORMAL
CREAT SERPL-MCNC: 1.31 MG/DL (ref 0.5–1.4)
EOSINOPHIL # BLD AUTO: 0.2 K/UL (ref 0–0.51)
EOSINOPHIL NFR BLD: 2.6 % (ref 0–6.9)
EPI CELLS #/AREA URNS HPF: ABNORMAL /HPF
ERYTHROCYTE [DISTWIDTH] IN BLOOD BY AUTOMATED COUNT: 51.8 FL (ref 35.9–50)
EST. AVERAGE GLUCOSE BLD GHB EST-MCNC: 237 MG/DL
FERRITIN SERPL-MCNC: 293 NG/ML (ref 10–291)
GFR SERPLBLD CREATININE-BSD FMLA CKD-EPI: 45 ML/MIN/1.73 M 2
GLOBULIN SER CALC-MCNC: 3.6 G/DL (ref 1.9–3.5)
GLUCOSE SERPL-MCNC: 182 MG/DL (ref 65–99)
GLUCOSE UR STRIP.AUTO-MCNC: 500 MG/DL
GRAN CASTS #/AREA URNS LPF: ABNORMAL /LPF
HBA1C MFR BLD: 9.9 % (ref 4–5.6)
HCT VFR BLD AUTO: 34.4 % (ref 37–47)
HDLC SERPL-MCNC: 83 MG/DL
HGB BLD-MCNC: 11.2 G/DL (ref 12–16)
IMM GRANULOCYTES # BLD AUTO: 0.04 K/UL (ref 0–0.11)
IMM GRANULOCYTES NFR BLD AUTO: 0.5 % (ref 0–0.9)
INR PPP: 1.05 (ref 0.87–1.13)
IRON SATN MFR SERPL: 32 % (ref 15–55)
IRON SERPL-MCNC: 87 UG/DL (ref 40–170)
KETONES UR STRIP.AUTO-MCNC: NEGATIVE MG/DL
LDLC SERPL CALC-MCNC: 192 MG/DL
LEUKOCYTE ESTERASE UR QL STRIP.AUTO: ABNORMAL
LYMPHOCYTES # BLD AUTO: 1.06 K/UL (ref 1–4.8)
LYMPHOCYTES NFR BLD: 13.9 % (ref 22–41)
MAGNESIUM SERPL-MCNC: 2 MG/DL (ref 1.5–2.5)
MCH RBC QN AUTO: 30.6 PG (ref 27–33)
MCHC RBC AUTO-ENTMCNC: 32.6 G/DL (ref 33.6–35)
MCV RBC AUTO: 94 FL (ref 81.4–97.8)
MICRO URNS: ABNORMAL
MONOCYTES # BLD AUTO: 0.66 K/UL (ref 0–0.85)
MONOCYTES NFR BLD AUTO: 8.6 % (ref 0–13.4)
NEUTROPHILS # BLD AUTO: 5.53 K/UL (ref 2–7.15)
NEUTROPHILS NFR BLD: 72.3 % (ref 44–72)
NITRITE UR QL STRIP.AUTO: POSITIVE
NRBC # BLD AUTO: 0 K/UL
NRBC BLD-RTO: 0 /100 WBC
PH UR STRIP.AUTO: 5.5 [PH] (ref 5–8)
PHOSPHATE SERPL-MCNC: 4.1 MG/DL (ref 2.5–4.5)
PLATELET # BLD AUTO: 373 K/UL (ref 164–446)
PMV BLD AUTO: 11.3 FL (ref 9–12.9)
POTASSIUM SERPL-SCNC: 4.3 MMOL/L (ref 3.6–5.5)
PROT SERPL-MCNC: 7.3 G/DL (ref 6–8.2)
PROT SERPL-MCNC: 7.5 G/DL (ref 6–8.2)
PROT UR QL STRIP: 300 MG/DL
PROTHROMBIN TIME: 13.6 SEC (ref 12–14.6)
PTH-INTACT SERPL-MCNC: 40.9 PG/ML (ref 14–72)
RBC # BLD AUTO: 3.66 M/UL (ref 4.2–5.4)
RBC # URNS HPF: ABNORMAL /HPF
RBC UR QL AUTO: NEGATIVE
SODIUM SERPL-SCNC: 137 MMOL/L (ref 135–145)
SP GR UR STRIP.AUTO: 1.03
T3FREE SERPL-MCNC: 2.03 PG/ML (ref 2–4.4)
TIBC SERPL-MCNC: 270 UG/DL (ref 250–450)
TRANSFERRIN SERPL-MCNC: 197 MG/DL (ref 200–370)
TRIGL SERPL-MCNC: 186 MG/DL (ref 0–149)
TSH SERPL DL<=0.005 MIU/L-ACNC: 4.27 UIU/ML (ref 0.38–5.33)
UIBC SERPL-MCNC: 183 UG/DL (ref 110–370)
URATE SERPL-MCNC: 4.8 MG/DL (ref 1.9–8.2)
UROBILINOGEN UR STRIP.AUTO-MCNC: 1 MG/DL
VIT B12 SERPL-MCNC: 2197 PG/ML (ref 211–911)
WBC # BLD AUTO: 7.7 K/UL (ref 4.8–10.8)
WBC #/AREA URNS HPF: ABNORMAL /HPF

## 2023-03-03 PROCEDURE — 85025 COMPLETE CBC W/AUTO DIFF WBC: CPT

## 2023-03-03 PROCEDURE — 86225 DNA ANTIBODY NATIVE: CPT

## 2023-03-03 PROCEDURE — 83036 HEMOGLOBIN GLYCOSYLATED A1C: CPT

## 2023-03-03 PROCEDURE — 86038 ANTINUCLEAR ANTIBODIES: CPT

## 2023-03-03 PROCEDURE — 84481 FREE ASSAY (FT-3): CPT

## 2023-03-03 PROCEDURE — 86256 FLUORESCENT ANTIBODY TITER: CPT | Mod: 91

## 2023-03-03 PROCEDURE — 82306 VITAMIN D 25 HYDROXY: CPT

## 2023-03-03 PROCEDURE — 82239 BILE ACIDS TOTAL: CPT

## 2023-03-03 PROCEDURE — 83540 ASSAY OF IRON: CPT

## 2023-03-03 PROCEDURE — 84100 ASSAY OF PHOSPHORUS: CPT

## 2023-03-03 PROCEDURE — 80076 HEPATIC FUNCTION PANEL: CPT

## 2023-03-03 PROCEDURE — 83550 IRON BINDING TEST: CPT

## 2023-03-03 PROCEDURE — 83970 ASSAY OF PARATHORMONE: CPT

## 2023-03-03 PROCEDURE — 83735 ASSAY OF MAGNESIUM: CPT

## 2023-03-03 PROCEDURE — 83516 IMMUNOASSAY NONANTIBODY: CPT | Mod: 91

## 2023-03-03 PROCEDURE — 82784 ASSAY IGA/IGD/IGG/IGM EACH: CPT

## 2023-03-03 PROCEDURE — 82607 VITAMIN B-12: CPT

## 2023-03-03 PROCEDURE — 84439 ASSAY OF FREE THYROXINE: CPT

## 2023-03-03 PROCEDURE — 84466 ASSAY OF TRANSFERRIN: CPT

## 2023-03-03 PROCEDURE — 82785 ASSAY OF IGE: CPT

## 2023-03-03 PROCEDURE — 83520 IMMUNOASSAY QUANT NOS NONAB: CPT

## 2023-03-03 PROCEDURE — 82728 ASSAY OF FERRITIN: CPT

## 2023-03-03 PROCEDURE — 80053 COMPREHEN METABOLIC PANEL: CPT

## 2023-03-03 PROCEDURE — 82570 ASSAY OF URINE CREATININE: CPT

## 2023-03-03 PROCEDURE — 81001 URINALYSIS AUTO W/SCOPE: CPT

## 2023-03-03 PROCEDURE — 80061 LIPID PANEL: CPT

## 2023-03-03 PROCEDURE — 84425 ASSAY OF VITAMIN B-1: CPT

## 2023-03-03 PROCEDURE — 85610 PROTHROMBIN TIME: CPT

## 2023-03-03 PROCEDURE — 84550 ASSAY OF BLOOD/URIC ACID: CPT

## 2023-03-03 PROCEDURE — 82043 UR ALBUMIN QUANTITATIVE: CPT

## 2023-03-03 PROCEDURE — 36415 COLL VENOUS BLD VENIPUNCTURE: CPT

## 2023-03-03 PROCEDURE — 82746 ASSAY OF FOLIC ACID SERUM: CPT

## 2023-03-03 PROCEDURE — 86256 FLUORESCENT ANTIBODY TITER: CPT

## 2023-03-03 PROCEDURE — 86235 NUCLEAR ANTIGEN ANTIBODY: CPT | Mod: 91

## 2023-03-03 PROCEDURE — 84443 ASSAY THYROID STIM HORMONE: CPT

## 2023-03-04 LAB
CREAT UR-MCNC: 98.31 MG/DL
FOLATE SERPL-MCNC: 6.5 NG/ML
MICROALBUMIN UR-MCNC: 380.3 MG/DL
MICROALBUMIN/CREAT UR: 3868 MG/G (ref 0–30)

## 2023-03-05 LAB — DSDNA AB TITR SER CLIF: NORMAL {TITER}

## 2023-03-06 ENCOUNTER — HOME CARE VISIT (OUTPATIENT)
Dept: HOME HEALTH SERVICES | Facility: HOME HEALTHCARE | Age: 68
End: 2023-03-06

## 2023-03-06 LAB
BILE AC SERPL-SCNC: 101 UMOL/L (ref 0–10)
CENTROMERE IGG TITR SER IF: 0 AU/ML (ref 0–40)
CHROMATIN IGG SERPL-ACNC: 53 UNITS (ref 0–19)
ENA JO1 AB TITR SER: 0 AU/ML (ref 0–40)
ENA SCL70 IGG SER QL: 0 AU/ML (ref 0–40)
ENA SM IGG SER-ACNC: 4 AU/ML (ref 0–40)
ENA SS-B IGG SER IA-ACNC: 0 AU/ML (ref 0–40)
IGA SERPL-MCNC: 355 MG/DL (ref 68–408)
IGG SERPL-MCNC: 928 MG/DL (ref 768–1632)
IGM SERPL-MCNC: 166 MG/DL (ref 35–263)
SSA52 R0ENA AB IGG Q0420: 0 AU/ML (ref 0–40)
SSA60 R0ENA AB IGG Q0419: 0 AU/ML (ref 0–40)
U1 SNRNP IGG SER QL: 5 UNITS (ref 0–19)

## 2023-03-07 LAB
DSDNA IGG TITR SER CLIF: NORMAL {TITER}
IGE SERPL-ACNC: 46 KU/L

## 2023-03-09 ENCOUNTER — HOSPITAL ENCOUNTER (OUTPATIENT)
Dept: LAB | Facility: MEDICAL CENTER | Age: 68
End: 2023-03-09
Attending: INTERNAL MEDICINE
Payer: MEDICARE

## 2023-03-09 LAB
ALBUMIN SERPL BCP-MCNC: 3.3 G/DL (ref 3.2–4.9)
ALP SERPL-CCNC: 910 U/L (ref 30–99)
ALT SERPL-CCNC: 83 U/L (ref 2–50)
AST SERPL-CCNC: 65 U/L (ref 12–45)
BILIRUB CONJ SERPL-MCNC: 1.3 MG/DL (ref 0.1–0.5)
BILIRUB INDIRECT SERPL-MCNC: 0.5 MG/DL (ref 0–1)
BILIRUB SERPL-MCNC: 1.8 MG/DL (ref 0.1–1.5)
PROT SERPL-MCNC: 7 G/DL (ref 6–8.2)
T4 FREE SERPL DIALY-MCNC: 1.8 NG/DL (ref 1.1–2.4)
VIT B1 BLD-MCNC: 112 NMOL/L (ref 70–180)

## 2023-03-09 PROCEDURE — 80076 HEPATIC FUNCTION PANEL: CPT

## 2023-03-09 PROCEDURE — 36415 COLL VENOUS BLD VENIPUNCTURE: CPT

## 2023-03-11 DIAGNOSIS — L29.9 PRURITUS: ICD-10-CM

## 2023-03-13 LAB — MISCELLANEOUS LAB RESULT MISCLAB: NORMAL

## 2023-03-14 ENCOUNTER — OFFICE VISIT (OUTPATIENT)
Dept: NEPHROLOGY | Facility: MEDICAL CENTER | Age: 68
End: 2023-03-14
Payer: MEDICARE

## 2023-03-14 VITALS
TEMPERATURE: 97.7 F | HEIGHT: 66 IN | BODY MASS INDEX: 19.77 KG/M2 | HEART RATE: 81 BPM | OXYGEN SATURATION: 99 % | WEIGHT: 123 LBS

## 2023-03-14 DIAGNOSIS — N17.9 AKI (ACUTE KIDNEY INJURY) (HCC): ICD-10-CM

## 2023-03-14 DIAGNOSIS — R80.9 NEPHROTIC RANGE PROTEINURIA: ICD-10-CM

## 2023-03-14 DIAGNOSIS — K72.90 LIVER FAILURE WITHOUT HEPATIC COMA, UNSPECIFIED CHRONICITY (HCC): ICD-10-CM

## 2023-03-14 PROCEDURE — 99214 OFFICE O/P EST MOD 30 MIN: CPT | Performed by: INTERNAL MEDICINE

## 2023-03-14 RX ORDER — FENOFIBRATE 48 MG/1
48 TABLET, COATED ORAL
COMMUNITY
Start: 2023-03-13 | End: 2023-04-04

## 2023-03-14 RX ORDER — RIFAMPIN 300 MG/1
CAPSULE ORAL
COMMUNITY
Start: 2023-02-01 | End: 2023-03-20

## 2023-03-14 RX ORDER — ONDANSETRON 8 MG/1
8 TABLET, ORALLY DISINTEGRATING ORAL 3 TIMES DAILY PRN
COMMUNITY
Start: 2023-02-06 | End: 2023-05-27

## 2023-03-14 RX ORDER — TRIAMCINOLONE ACETONIDE 5 MG/G
CREAM TOPICAL
COMMUNITY
Start: 2023-03-11 | End: 2023-03-20

## 2023-03-14 RX ORDER — VITAMIN B COMPLEX
TABLET ORAL
COMMUNITY
End: 2023-08-28

## 2023-03-14 RX ORDER — LEVOTHYROXINE SODIUM 0.12 MG/1
250 TABLET ORAL EVERY MORNING
COMMUNITY
Start: 2023-02-08 | End: 2023-03-14

## 2023-03-14 RX ORDER — LIOTHYRONINE SODIUM 5 UG/1
1 TABLET ORAL EVERY MORNING
COMMUNITY
Start: 2023-02-06 | End: 2023-03-20

## 2023-03-14 RX ORDER — URSODIOL 300 MG/1
300-600 CAPSULE ORAL 2 TIMES DAILY
COMMUNITY
Start: 2023-01-10

## 2023-03-14 ASSESSMENT — ENCOUNTER SYMPTOMS
FEVER: 0
CHILLS: 0
NAUSEA: 0
VOMITING: 0
SHORTNESS OF BREATH: 0
DEPRESSION: 1
NERVOUS/ANXIOUS: 1
HYPERTENSION: 1
COUGH: 0

## 2023-03-14 ASSESSMENT — FIBROSIS 4 INDEX: FIB4 SCORE: 1.24

## 2023-03-14 NOTE — PROGRESS NOTES
"Subjective     Asha Chelly Manuel is a 67 y.o. female who presents with Chronic Kidney Disease and Hypertension            Patient was diagnosed with autoimmune liver disease, she also been evaluated for possible malignancy.    Chronic Kidney Disease  This is a chronic problem. The current episode started more than 1 year ago. The problem occurs constantly. The problem has been gradually worsening. Pertinent negatives include no chest pain, chills, coughing, fever, nausea, urinary symptoms or vomiting.   Hypertension  This is a chronic problem. The current episode started more than 1 year ago. The problem is unchanged. The problem is controlled. Pertinent negatives include no chest pain, malaise/fatigue, peripheral edema or shortness of breath. Risk factors for coronary artery disease include post-menopausal state and diabetes mellitus. Past treatments include diuretics and beta blockers. The current treatment provides significant improvement. There are no compliance problems.  Hypertensive end-organ damage includes kidney disease. Identifiable causes of hypertension include chronic renal disease.     Review of Systems   Constitutional:  Negative for chills, fever and malaise/fatigue.   Respiratory:  Negative for cough and shortness of breath.    Cardiovascular:  Negative for chest pain and leg swelling.   Gastrointestinal:  Negative for nausea and vomiting.   Genitourinary:  Negative for dysuria, frequency and urgency.   Psychiatric/Behavioral:  Positive for depression. The patient is nervous/anxious.             Objective     BP (P) 110/72 (BP Location: Left arm, Patient Position: Sitting, BP Cuff Size: Adult)   Pulse 81   Temp 36.5 °C (97.7 °F) (Temporal)   Ht 1.676 m (5' 6\")   Wt 55.8 kg (123 lb)   LMP 04/29/2005 (LMP Unknown)   SpO2 99%   BMI 19.85 kg/m²      Physical Exam  Vitals and nursing note reviewed.   Constitutional:       General: She is not in acute distress.     Appearance: Normal " "appearance. She is well-developed. She is not diaphoretic.   HENT:      Head: Normocephalic and atraumatic.      Right Ear: External ear normal.      Left Ear: External ear normal.      Nose: Nose normal.   Eyes:      General: No scleral icterus.        Right eye: No discharge.         Left eye: No discharge.      Conjunctiva/sclera: Conjunctivae normal.   Cardiovascular:      Rate and Rhythm: Normal rate and regular rhythm.      Heart sounds: No murmur heard.  Pulmonary:      Effort: Pulmonary effort is normal. No respiratory distress.      Breath sounds: Normal breath sounds.   Musculoskeletal:         General: No tenderness.      Right lower leg: No edema.      Left lower leg: No edema.   Skin:     General: Skin is warm and dry.      Findings: No erythema.   Neurological:      General: No focal deficit present.      Mental Status: She is alert and oriented to person, place, and time.      Cranial Nerves: No cranial nerve deficit.   Psychiatric:         Mood and Affect: Mood normal.         Behavior: Behavior normal.     Past Medical History:   Diagnosis Date    Adverse effect of anesthesia     in 2008 \"throat closes up\"\"anxiety\" ?laryngospasm, kept in ICU. Pt states no problems currently 2018.     Anesthesia     in 2008 \"throat closes up\"\"anxiety\" ?laryngospasm, kept in ICU. Pt states no problems currently 2018.     Arthritis     right shoulder, hands    Cigarette smoker quit 2013    Dental disorder     missing teeth     depression 8/30/2016    denies depression, states has anxiety and panic attacks    Diabetes mellitus type 1 (HCC) 1989    insulin    Encounter for long-term (current) use of insulin (HCC) 9/25/2013    Heart burn     High cholesterol     Hypertension     Hypothyroidism, postsurgical 1970    Indigestion     Infectious disease      had hepatitis C, tested neg.    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, uncontrolled(250.63) 6/10/2015     Social History     Socioeconomic History    Marital status:      Spouse name: Not on file    Number of children: Not on file    Years of education: Not on file    Highest education level: Not on file   Occupational History    Not on file   Tobacco Use    Smoking status: Every Day     Packs/day: 0.50     Years: 30.00     Pack years: 15.00     Types: Cigarettes    Smokeless tobacco: Never   Vaping Use    Vaping Use: Never used   Substance and Sexual Activity    Alcohol use: Not Currently    Drug use: Yes     Comment: Marijuana - Rarely    Sexual activity: Not on file   Other Topics Concern    Not on file   Social History Narrative    Not on file     Social Determinants of Health     Financial Resource Strain: Not on file   Food Insecurity: Not on file   Transportation Needs: Not on file   Physical Activity: Not on file   Stress: Not on file   Social Connections: Not on file   Intimate Partner Violence: Not on file   Housing Stability: Not on file     Family History   Problem Relation Age of Onset    Hypertension Mother     Cancer Father      Recent Labs     07/13/22  1517 08/29/22  0950 11/19/22  1028 01/30/23  1533 02/21/23  1550 03/03/23  1010 03/03/23  1017 03/09/23  1147   ALBUMIN 3.7   < > 3.8  --  3.2 3.6 3.7 3.3   HDL  --   --  114  --   --   --  83  --    TRIGLYCERIDE  --   --  93  --   --   --  186*  --    SODIUM 140   < > 140  --  138  --  137  --    POTASSIUM 3.1*   < > 4.4  --  3.8  --  4.3  --    CHLORIDE 101   < > 103  --  103  --  103  --    CO2 29   < > 23  --  24  --  21  --    BUN 23*   < > 28*  --  22  --  26*  --    CREATININE 1.16   < > 1.07  --  1.14  --  1.31  --    PHOSPHORUS 4.3  --   --  3.8  --   --  4.1  --     < > = values in this interval not displayed.                           Assessment & Plan        1. RHEA (acute  kidney injury) (HCC)  No clear etiology  Check serology  Plan a kidney biopsy  Recheck labs    2. Nephrotic range proteinuria  Plan a kidney biopsy    3. Liver failure without hepatic coma, unspecified chronicity (HCC)  Follow-up with liver specialist

## 2023-03-15 ENCOUNTER — HOSPITAL ENCOUNTER (OUTPATIENT)
Dept: LAB | Facility: MEDICAL CENTER | Age: 68
End: 2023-03-15
Attending: INTERNAL MEDICINE
Payer: MEDICARE

## 2023-03-15 LAB
ALBUMIN SERPL BCP-MCNC: 3.4 G/DL (ref 3.2–4.9)
ALP SERPL-CCNC: 886 U/L (ref 30–99)
ALT SERPL-CCNC: 79 U/L (ref 2–50)
AST SERPL-CCNC: 57 U/L (ref 12–45)
BILIRUB CONJ SERPL-MCNC: 1 MG/DL (ref 0.1–0.5)
BILIRUB INDIRECT SERPL-MCNC: 0.4 MG/DL (ref 0–1)
BILIRUB SERPL-MCNC: 1.4 MG/DL (ref 0.1–1.5)
PROT SERPL-MCNC: 7.2 G/DL (ref 6–8.2)

## 2023-03-15 PROCEDURE — 80076 HEPATIC FUNCTION PANEL: CPT

## 2023-03-15 PROCEDURE — 36415 COLL VENOUS BLD VENIPUNCTURE: CPT

## 2023-03-16 ENCOUNTER — APPOINTMENT (OUTPATIENT)
Dept: ADMISSIONS | Facility: MEDICAL CENTER | Age: 68
End: 2023-03-16
Attending: INTERNAL MEDICINE
Payer: MEDICARE

## 2023-03-17 ENCOUNTER — APPOINTMENT (OUTPATIENT)
Dept: ENDOCRINOLOGY | Facility: MEDICAL CENTER | Age: 68
End: 2023-03-17
Attending: INTERNAL MEDICINE
Payer: MEDICARE

## 2023-03-17 RX ORDER — TRIAMCINOLONE ACETONIDE 1 MG/G
CREAM TOPICAL
Qty: 454 G | Refills: 1 | Status: SHIPPED | OUTPATIENT
Start: 2023-03-17 | End: 2023-03-20

## 2023-03-20 ENCOUNTER — APPOINTMENT (OUTPATIENT)
Dept: ADMISSIONS | Facility: MEDICAL CENTER | Age: 68
End: 2023-03-20
Attending: INTERNAL MEDICINE
Payer: MEDICARE

## 2023-03-20 VITALS — BODY MASS INDEX: 19.85 KG/M2 | HEIGHT: 66 IN

## 2023-03-20 DIAGNOSIS — N17.9 AKI (ACUTE KIDNEY INJURY) (HCC): ICD-10-CM

## 2023-03-20 DIAGNOSIS — Z01.812 PRE-OPERATIVE LABORATORY EXAMINATION: ICD-10-CM

## 2023-03-20 DIAGNOSIS — R80.9 NEPHROTIC RANGE PROTEINURIA: ICD-10-CM

## 2023-03-20 LAB
ERYTHROCYTE [DISTWIDTH] IN BLOOD BY AUTOMATED COUNT: 52.7 FL (ref 35.9–50)
HCT VFR BLD AUTO: 32 % (ref 37–47)
HGB BLD-MCNC: 10.3 G/DL (ref 12–16)
INR PPP: 0.89 (ref 0.87–1.13)
MCH RBC QN AUTO: 30.5 PG (ref 27–33)
MCHC RBC AUTO-ENTMCNC: 32.2 G/DL (ref 33.6–35)
MCV RBC AUTO: 94.7 FL (ref 81.4–97.8)
PLATELET # BLD AUTO: 395 K/UL (ref 164–446)
PMV BLD AUTO: 10.2 FL (ref 9–12.9)
PROTHROMBIN TIME: 12 SEC (ref 12–14.6)
RBC # BLD AUTO: 3.38 M/UL (ref 4.2–5.4)
WBC # BLD AUTO: 8.5 K/UL (ref 4.8–10.8)

## 2023-03-20 PROCEDURE — 85610 PROTHROMBIN TIME: CPT

## 2023-03-20 PROCEDURE — 36415 COLL VENOUS BLD VENIPUNCTURE: CPT

## 2023-03-20 PROCEDURE — 85027 COMPLETE CBC AUTOMATED: CPT

## 2023-03-20 RX ORDER — OXYCODONE AND ACETAMINOPHEN 7.5; 325 MG/1; MG/1
TABLET ORAL PRN
COMMUNITY
Start: 2023-01-26 | End: 2024-02-14

## 2023-03-22 ENCOUNTER — HOSPITAL ENCOUNTER (OUTPATIENT)
Facility: MEDICAL CENTER | Age: 68
End: 2023-03-22
Attending: INTERNAL MEDICINE | Admitting: INTERNAL MEDICINE
Payer: MEDICARE

## 2023-03-22 ENCOUNTER — APPOINTMENT (OUTPATIENT)
Dept: RADIOLOGY | Facility: MEDICAL CENTER | Age: 68
End: 2023-03-22
Attending: INTERNAL MEDICINE
Payer: MEDICARE

## 2023-03-22 VITALS
BODY MASS INDEX: 20.34 KG/M2 | DIASTOLIC BLOOD PRESSURE: 83 MMHG | SYSTOLIC BLOOD PRESSURE: 164 MMHG | TEMPERATURE: 98.1 F | HEART RATE: 65 BPM | HEIGHT: 66 IN | RESPIRATION RATE: 13 BRPM | OXYGEN SATURATION: 97 % | WEIGHT: 126.54 LBS

## 2023-03-22 DIAGNOSIS — R80.9 NEPHROTIC RANGE PROTEINURIA: ICD-10-CM

## 2023-03-22 DIAGNOSIS — N17.9 AKI (ACUTE KIDNEY INJURY) (HCC): ICD-10-CM

## 2023-03-22 LAB
GLUCOSE BLD STRIP.AUTO-MCNC: 168 MG/DL (ref 65–99)
PATHOLOGY CONSULT NOTE: NORMAL

## 2023-03-22 PROCEDURE — 82962 GLUCOSE BLOOD TEST: CPT

## 2023-03-22 PROCEDURE — 160002 HCHG RECOVERY MINUTES (STAT)

## 2023-03-22 PROCEDURE — 160036 HCHG PACU - EA ADDL 30 MINS PHASE I

## 2023-03-22 PROCEDURE — 88300 SURGICAL PATH GROSS: CPT

## 2023-03-22 PROCEDURE — 88346 IMFLUOR 1ST 1ANTB STAIN PX: CPT

## 2023-03-22 PROCEDURE — 88313 SPECIAL STAINS GROUP 2: CPT

## 2023-03-22 PROCEDURE — 700111 HCHG RX REV CODE 636 W/ 250 OVERRIDE (IP): Performed by: RADIOLOGY

## 2023-03-22 PROCEDURE — 77012 CT SCAN FOR NEEDLE BIOPSY: CPT

## 2023-03-22 PROCEDURE — 160035 HCHG PACU - 1ST 60 MINS PHASE I

## 2023-03-22 PROCEDURE — 88348 ELECTRON MICROSCOPY DX: CPT

## 2023-03-22 PROCEDURE — 700111 HCHG RX REV CODE 636 W/ 250 OVERRIDE (IP)

## 2023-03-22 PROCEDURE — 700105 HCHG RX REV CODE 258: Performed by: RADIOLOGY

## 2023-03-22 PROCEDURE — 88350 IMFLUOR EA ADDL 1ANTB STN PX: CPT | Mod: 91

## 2023-03-22 PROCEDURE — 88305 TISSUE EXAM BY PATHOLOGIST: CPT

## 2023-03-22 RX ORDER — OXYCODONE HYDROCHLORIDE 5 MG/1
2.5 TABLET ORAL
Status: DISCONTINUED | OUTPATIENT
Start: 2023-03-22 | End: 2023-03-22 | Stop reason: HOSPADM

## 2023-03-22 RX ORDER — SODIUM CHLORIDE 9 MG/ML
1000 INJECTION, SOLUTION INTRAVENOUS ONCE
Status: COMPLETED | OUTPATIENT
Start: 2023-03-22 | End: 2023-03-22

## 2023-03-22 RX ORDER — HYDROMORPHONE HYDROCHLORIDE 1 MG/ML
0.25 INJECTION, SOLUTION INTRAMUSCULAR; INTRAVENOUS; SUBCUTANEOUS
Status: DISCONTINUED | OUTPATIENT
Start: 2023-03-22 | End: 2023-03-22 | Stop reason: HOSPADM

## 2023-03-22 RX ORDER — ONDANSETRON 2 MG/ML
4 INJECTION INTRAMUSCULAR; INTRAVENOUS PRN
Status: ACTIVE | OUTPATIENT
Start: 2023-03-22 | End: 2023-03-22

## 2023-03-22 RX ORDER — ONDANSETRON 2 MG/ML
4 INJECTION INTRAMUSCULAR; INTRAVENOUS EVERY 8 HOURS PRN
Status: DISCONTINUED | OUTPATIENT
Start: 2023-03-22 | End: 2023-03-22 | Stop reason: HOSPADM

## 2023-03-22 RX ORDER — SODIUM CHLORIDE 9 MG/ML
500 INJECTION, SOLUTION INTRAVENOUS
Status: ACTIVE | OUTPATIENT
Start: 2023-03-22 | End: 2023-03-22

## 2023-03-22 RX ORDER — MIDAZOLAM HYDROCHLORIDE 1 MG/ML
INJECTION INTRAMUSCULAR; INTRAVENOUS
Status: COMPLETED
Start: 2023-03-22 | End: 2023-03-22

## 2023-03-22 RX ORDER — MIDAZOLAM HYDROCHLORIDE 1 MG/ML
.5-2 INJECTION INTRAMUSCULAR; INTRAVENOUS PRN
Status: ACTIVE | OUTPATIENT
Start: 2023-03-22 | End: 2023-03-22

## 2023-03-22 RX ADMIN — MIDAZOLAM HYDROCHLORIDE 1 MG: 1 INJECTION, SOLUTION INTRAMUSCULAR; INTRAVENOUS at 08:38

## 2023-03-22 RX ADMIN — SODIUM CHLORIDE 1000 ML: 9 INJECTION, SOLUTION INTRAVENOUS at 07:42

## 2023-03-22 RX ADMIN — MIDAZOLAM HYDROCHLORIDE 1 MG: 1 INJECTION, SOLUTION INTRAMUSCULAR; INTRAVENOUS at 08:33

## 2023-03-22 RX ADMIN — FENTANYL CITRATE 50 MCG: 50 INJECTION, SOLUTION INTRAMUSCULAR; INTRAVENOUS at 08:38

## 2023-03-22 RX ADMIN — FENTANYL CITRATE 50 MCG: 50 INJECTION, SOLUTION INTRAMUSCULAR; INTRAVENOUS at 08:33

## 2023-03-22 ASSESSMENT — FIBROSIS 4 INDEX
FIB4 SCORE: 1.06
FIB4 SCORE: 1.06

## 2023-03-22 ASSESSMENT — PAIN DESCRIPTION - PAIN TYPE
TYPE: SURGICAL PAIN

## 2023-03-22 NOTE — OR NURSING
Pt with gross hematuria. Informed Dr. Rothman. Dr. Rothman wants pt to stay for 2 more hours and void again. Bedrest .

## 2023-03-22 NOTE — OR SURGEON
Immediate Post- Operative Note    PostOp Diagnosis: CKD      Procedure(s): CT GUIDED RIGHT RENAL BIOPSY    18G CORES BIOPINCE NEEDLE X 4    SUBMITTED TO ATTENDING PATHOLOGIST      Estimated Blood Loss: <5CC      FINDINGS: NEEDLE CONFIRMED. NO PERINEPHRIC HEMATOMA        Complications: NONE            3/22/2023     8:40 AM     Seun Rothman M.D.

## 2023-03-22 NOTE — PROGRESS NOTES
IR Nursing Note    CT-guided non-targeted renal biopsy by MD Rothman assisted by RT Hill,right kidney (posterior approach) access site.  Procedure site was marked by MD and verified using imaging guidance.  Patient was placed in a prone position. Vitals were taken every 5 minutes and remained stable during procedure (see doc flow sheet for results).  CO2 waveform capnography was monitored and remained stable throughout procedure.  A gauze and tegaderm dressing was placed over surgical site.     Four cores of right kidney collected by Stephan, specimen sent and delivered to lab by pathology (in room for biopsy)..    Report given to MINI Blunt; patient transported to PACU 4A via IR MINI Correa monitored then transferred care to report RN.

## 2023-03-22 NOTE — DISCHARGE INSTRUCTIONS
HOME CARE INSTRUCTIONS    ACTIVITY: Rest and take it easy for the first 24 hours.  A responsible adult is recommended to remain with you during that time.  It is normal to feel sleepy.  We encourage you to not do anything that requires balance, judgment or coordination.    FOR 24 HOURS DO NOT:  Drive, operate machinery or run household appliances.  Drink beer or alcoholic beverages.  Make important decisions or sign legal documents.    SPECIAL INSTRUCTIONS: Percutaneous Kidney Biopsy, Care After  This sheet gives you information about how to care for yourself after your procedure. Your health care provider may also give you more specific instructions. If you have problems or questions, contact your health care provider.  What can I expect after the procedure?  After the procedure, it is common to have:  Pain or soreness near the area where the needle went through your skin (biopsy site).  Bright pink or cloudy urine for 24 hours after the procedure.  Follow these instructions at home:  Activity  Return to your normal activities as told by your health care provider. Ask your health care provider what activities are safe for you.  Do not drive for 24 hours if you were given a medicine to help you relax (sedative).  Do not lift anything that is heavier than 10 lb (4.5 kg) until your health care provider tells you that it is safe.  Avoid activities that take a lot of effort (are strenuous) until your health care provider approves. Most people will have to wait 2 weeks before returning to activities such as exercise or sexual intercourse.  General instructions  Take over-the-counter and prescription medicines only as told by your health care provider.  You may eat and drink after your procedure. Follow instructions from your health care provider about eating or drinking restrictions.  Check your biopsy site every day for signs of infection. Check for:  More redness, swelling, or pain.  More fluid or blood.  Warmth.  Pus  or a bad smell.  Keep all follow-up visits as told by your health care provider. This is important.  Contact a health care provider if:  You have more redness, swelling, or pain around your biopsy site.  You have more fluid or blood coming from your biopsy site.  Your biopsy site feels warm to the touch.  You have pus or a bad smell coming from your biopsy site.  You have blood in your urine more than 24 hours after your procedure.  Get help right away if:  You have dark red or brown urine.  You have a fever.  You are unable to urinate.  You feel burning when you urinate.  You feel faint.  You have severe pain in your abdomen or side.  This information is not intended to replace advice given to you by your health care provider. Make sure you discuss any questions you have with your health care provider.  Document Released: 08/20/2014 Document Revised: 11/30/2018 Document Reviewed: 09/29/2017  [employer] Patient Education © 2020 [employer]      DIET: To avoid nausea, slowly advance diet as tolerated, avoiding spicy or greasy foods for the first day.  Add more substantial food to your diet according to your physician's instructions.  Babies can be fed formula or breast milk as soon as they are hungry.  INCREASE FLUIDS AND FIBER TO AVOID CONSTIPATION.    SURGICAL DRESSING/BATHING: may remove dressing in 24 hours and then shower    MEDICATIONS: Resume taking daily medication.  Take prescribed pain medication with food.  If no medication is prescribed, you may take non-aspirin pain medication if needed.  PAIN MEDICATION CAN BE VERY CONSTIPATING.  Take a stool softener or laxative such as senokot, pericolace, or milk of magnesia if needed.    A follow-up appointment should be arranged with your doctor; call to schedule.    You should CALL YOUR PHYSICIAN if you develop:  Fever greater than 101 degrees F.  Pain not relieved by medication, or persistent nausea or vomiting.  Excessive bleeding (blood soaking through  dressing) or unexpected drainage from the wound.  Extreme redness or swelling around the incision site, drainage of pus or foul smelling drainage.  Inability to urinate or empty your bladder within 8 hours.  Problems with breathing or chest pain.    You should call 911 if you develop problems with breathing or chest pain.  If you are unable to contact your doctor or surgical center, you should go to the nearest emergency room or urgent care center.  Physician's telephone #: [phone]    MILD FLU-LIKE SYMPTOMS ARE NORMAL.  YOU MAY EXPERIENCE GENERALIZED MUSCLE ACHES, THROAT IRRITATION, HEADACHE AND/OR SOME NAUSEA.    If any questions arise, call your doctor.  If your doctor is not available, please feel free to call the Surgical Center at [phone].  The Center is open Monday through Friday from 7AM to 7PM.      A registered nurse may call you a few days after your surgery to see how you are doing after your procedure.    You may also receive a survey in the mail within the next two weeks and we ask that you take a few moments to complete the survey and return it to us.  Our goal is to provide you with very good care and we value your comments.     Depression / Suicide Risk    As you are discharged from this [facility] facility, it is important to learn how to keep safe from harming yourself.    Recognize the warning signs:  Abrupt changes in personality, positive or negative- including increase in energy   Giving away possessions  Change in eating patterns- significant weight changes-  positive or negative  Change in sleeping patterns- unable to sleep or sleeping all the time   Unwillingness or inability to communicate  Depression  Unusual sadness, discouragement and loneliness  Talk of wanting to die  Neglect of personal appearance   Rebelliousness- reckless behavior  Withdrawal from people/activities they love  Confusion- inability to concentrate     If you or a loved one observes any of these behaviors  or has concerns about self-harm, here's what you can do:  Talk about it- your feelings and reasons for harming yourself  Remove any means that you might use to hurt yourself (examples: pills, rope, extension cords, firearm)  Get professional help from the community (Mental Health, Substance Abuse, psychological counseling)  Do not be alone:Call your Safe Contact- someone whom you trust who will be there for you.  Call your local CRISIS HOTLINE 033-5595 or 579-449-0910  Call your local Children's Mobile Crisis Response Team [place] (219) 339-2468 or www.[employer]i  Call the toll free National Suicide Prevention Hotlines   National Suicide Prevention Lifeline 415-433-DPFS (9256)  National Hope Line Network 800-SUICIDE (079-7397)    I acknowledge receipt and understanding of these Home Care instructions.

## 2023-03-22 NOTE — OR NURSING
Pt voided pink urine. Discussed with Dr. Rothman and pt ok to d/c with instruction to return to ED if urine has dark blood or clots.

## 2023-03-24 ENCOUNTER — OFFICE VISIT (OUTPATIENT)
Dept: DERMATOLOGY | Facility: IMAGING CENTER | Age: 68
End: 2023-03-24
Payer: MEDICARE

## 2023-03-24 DIAGNOSIS — D69.2 SENILE PURPURA (HCC): ICD-10-CM

## 2023-03-24 DIAGNOSIS — L28.1 PRURIGO NODULARIS: ICD-10-CM

## 2023-03-24 PROCEDURE — 99213 OFFICE O/P EST LOW 20 MIN: CPT | Performed by: DERMATOLOGY

## 2023-03-24 RX ORDER — RIFAMPIN 300 MG/1
300 CAPSULE ORAL 2 TIMES DAILY
Status: ON HOLD | COMMUNITY
Start: 2023-02-01 | End: 2024-03-26

## 2023-03-24 NOTE — PROGRESS NOTES
"CC: Prurigo Nodularis    Subjective: patient here for  Prurigo Nodularis follow up. Symptoms improving    Reports improved itch, which she attributes to use of rifampin. Declined IL TAC trx today  Continues to followup liver care and elevated labs.   Has next imaging - MRI    PREV VISIT 12/02/2022:  \"Using betamethasone cream which really helps. TAC cream - has a large supply. Topical clindamycin solution hasn't helped a lot.    Getting w/u with Dr. Rita ESTES with plans to send to Liver doctor at [place]. Unclear what origin of  may be.     Patient tearful today - received large heating bill, needs to put dog down. Forgot phone today. Continues to be worried by state of health and notable increase in . Frustrated that she cannot get gut rx when it had helped in the past.\"     Prior note:   \"Pt here today for itching of arms as primary complaint. Has hx of several months of itching and scratching. Previously arms, body.  Pt states Clindamycin improves symptoms, but not completely. And has used Triamcinolone, but not using much. Pt states she's still has itchiness.    Reports chair acquired at [employer] may have brought bed bugs into home. House treated a couple times without improvement in symptoms and \"last  said there may not even have been bugs in the first place.\"  Used allegra for a while, but didn't help.     Has leg swelling and weight gain, 10 pounds in recent week. Is going to  medication to help reduce water weight soon.    Had admission recently with 40 pound weight gain, but preceding that had substantial weight loss.     Reports past gallbladder problems and removal.   Has GI scope procedure scheduled for November.  Reports cancer screening up to date but no paps since hysterectomy    Seen at [facility]\"    History of skin cancer: No  History of precancers/actinic keratoses: No  History of biopsies:Yes, Details: lower leg Bx done, results benign   GA of wrist " 2022  History of blistering/severe sunburns:No  Family history of skin cancer:No     ROS: numbness, burning of distal extremities, itching. No fevers/chills. No cough.  PMH: T1DM, polyneuropathy, thyroid dz, elevated liver enzymes/alk phos  Social: smoker    EXAM:Gen: WDWN female in NAD. Slender build. Skin: excoriated papules scant on arms. Broad patches of purpura on arms    IMPRESSION / PLAN:  1. Pruritus/prurigo nodularis, arms   , consider possible causes from underlying systemic dz/AI?: elevated transminitis, alk phos, thyroid dz/DM as likely: had previously messaged MDs - GI/Endocrine/PCP for close f/u with notable lab abnormalities. Cont liver f/u.  -mupirocin cream BID ---> PRN open sores  -TAC 0.5% cream BID PRN itching  -UV sunlight, no burning  -f/u PRN    Senile purpura:  -no trx reviewed today  -consider reduced topical steroid, as may be contributing    Ongoing management diabetes/thyroid disease: endocrine/PCP

## 2023-03-30 ENCOUNTER — HOSPITAL ENCOUNTER (OUTPATIENT)
Dept: LAB | Facility: MEDICAL CENTER | Age: 68
End: 2023-03-30
Attending: INTERNAL MEDICINE
Payer: MEDICARE

## 2023-03-30 LAB
ALBUMIN SERPL BCP-MCNC: 3 G/DL (ref 3.2–4.9)
ALP SERPL-CCNC: 717 U/L (ref 30–99)
ALT SERPL-CCNC: 50 U/L (ref 2–50)
AST SERPL-CCNC: 48 U/L (ref 12–45)
BILIRUB CONJ SERPL-MCNC: 1.2 MG/DL (ref 0.1–0.5)
BILIRUB INDIRECT SERPL-MCNC: 0.4 MG/DL (ref 0–1)
BILIRUB SERPL-MCNC: 1.6 MG/DL (ref 0.1–1.5)
PROT SERPL-MCNC: 6.9 G/DL (ref 6–8.2)

## 2023-03-30 PROCEDURE — 80076 HEPATIC FUNCTION PANEL: CPT

## 2023-03-30 PROCEDURE — 36415 COLL VENOUS BLD VENIPUNCTURE: CPT

## 2023-04-04 ENCOUNTER — NON-PROVIDER VISIT (OUTPATIENT)
Dept: ENDOCRINOLOGY | Facility: MEDICAL CENTER | Age: 68
End: 2023-04-04
Attending: INTERNAL MEDICINE
Payer: MEDICARE

## 2023-04-04 VITALS
DIASTOLIC BLOOD PRESSURE: 60 MMHG | HEIGHT: 66 IN | SYSTOLIC BLOOD PRESSURE: 122 MMHG | BODY MASS INDEX: 19.44 KG/M2 | OXYGEN SATURATION: 99 % | HEART RATE: 80 BPM | WEIGHT: 121 LBS

## 2023-04-04 DIAGNOSIS — E10.9 TYPE 1 DIABETES MELLITUS ON INSULIN THERAPY (HCC): ICD-10-CM

## 2023-04-04 PROCEDURE — 99213 OFFICE O/P EST LOW 20 MIN: CPT | Performed by: INTERNAL MEDICINE

## 2023-04-04 RX ORDER — INSULIN ASPART 100 [IU]/ML
15 INJECTION, SOLUTION INTRAVENOUS; SUBCUTANEOUS
Qty: 45 ML | Refills: 3 | Status: SHIPPED | OUTPATIENT
Start: 2023-04-04 | End: 2023-12-18 | Stop reason: CLARIF

## 2023-04-04 RX ORDER — PROCHLORPERAZINE 25 MG/1
1 SUPPOSITORY RECTAL
Qty: 1 EACH | Refills: 3 | Status: SHIPPED | OUTPATIENT
Start: 2023-04-04 | End: 2023-09-26

## 2023-04-04 RX ORDER — INSULIN GLARGINE 300 U/ML
45 INJECTION, SOLUTION SUBCUTANEOUS
Qty: 13.5 ML | Refills: 3 | Status: SHIPPED | OUTPATIENT
Start: 2023-04-04 | End: 2024-02-06 | Stop reason: SDUPTHER

## 2023-04-04 ASSESSMENT — FIBROSIS 4 INDEX: FIB4 SCORE: 1.12

## 2023-04-04 NOTE — PROGRESS NOTES
RN-CDE Note    Subjective:   Endocrinology Clinic Progress Note  PCP: Jarrell Yates M.D.    HPI:  Anu Manuel is a 67 y.o. old patient who is seen today by the Diabetes Nurse Specialist for review of Type 1 Diabetes.  Recent changes in health: Had a kidney biopsy and has been sick with liver issues and has had higher blood sugars.  She is feeling depressed with the high blood sugars and medical issues.    DM:   Last A1c:   Lab Results   Component Value Date/Time    HBA1C 9.9 (H) 03/03/2023 10:17 AM      Previous A1c was 10.8 on 11/29/22  A1C GOAL: < 8    Diabetes Medications:   Toujeo 22 units daily  Novolog 1:5 plus 1:50>150      Exercise: Has lost her balance making it hard to walk  Diet: She is nauseated and does not know what will stay down.  Breakfast is eggs and toast.  Lunch is sandwich.  Dinner is mostly carbohydrates.  Patient's body mass index is unknown because there is no height or weight on file. Exercise and nutrition counseling were performed at this visit.    Glucose monitoring frequency: See John download    Hypoglycemic episodes: no  Last Retinal Exam:  Just saw Dr. Ricketts  Daily Foot Exam: Yes   Foot Exam:  Monofilament: done  Monofilament testing with a 10 gram force: sensation intact: intact bilaterally  Visual Inspection: Feet with maceration, ulcers, fissures.  Deep fissure on right heel that she is putting ointment on.  Pedal pulses: intact bilaterally   Lab Results   Component Value Date/Time    MALBCRT 3868 (H) 03/03/2023 10:17 AM    MICROALBUR 380.3 03/03/2023 10:17 AM        ACR Albumin/Creatinine Ratio goal <30     HTN:   Blood pressure goal <130/<80 .   Currently Rx ACE/ARB: No     Dyslipidemia:    Lab Results   Component Value Date/Time    CHOLSTRLTOT 312 (H) 03/03/2023 10:17 AM     (H) 03/03/2023 10:17 AM    HDL 83 03/03/2023 10:17 AM    TRIGLYCERIDE 186 (H) 03/03/2023 10:17 AM         Currently Rx Statin: Yes     She  reports that she has been smoking cigarettes.  She has a 15.00 pack-year smoking history. She has never used smokeless tobacco.      Plan:     Discussed and educated on:   - All medications, side effects and compliance (discussed carefully)  - Annual eye examinations at [facility]  - Home glucose monitoring emphasized  - Weight control and daily exercise    Recommended medication changes: Increase Toujeo 26 units daily and keep adjusting until fasting blood sugars are .  She will start taking her Novolog before she eats.  She is getting on the Dexcom and will My Chart us to get a sharing code once she gets on.  Her goal is to get her blood sugars in the  range 75% of the time.  She will follow up in 3-4 months with Dr. Paz.

## 2023-04-16 ENCOUNTER — HOSPITAL ENCOUNTER (EMERGENCY)
Facility: MEDICAL CENTER | Age: 68
End: 2023-04-16
Attending: EMERGENCY MEDICINE
Payer: MEDICARE

## 2023-04-16 ENCOUNTER — APPOINTMENT (OUTPATIENT)
Dept: RADIOLOGY | Facility: MEDICAL CENTER | Age: 68
End: 2023-04-16
Attending: EMERGENCY MEDICINE
Payer: MEDICARE

## 2023-04-16 VITALS
BODY MASS INDEX: 19.56 KG/M2 | RESPIRATION RATE: 20 BRPM | DIASTOLIC BLOOD PRESSURE: 83 MMHG | SYSTOLIC BLOOD PRESSURE: 156 MMHG | TEMPERATURE: 97.5 F | HEART RATE: 78 BPM | WEIGHT: 121.69 LBS | OXYGEN SATURATION: 98 % | HEIGHT: 66 IN

## 2023-04-16 DIAGNOSIS — K52.9 PROCTOCOLITIS: ICD-10-CM

## 2023-04-16 LAB
ALBUMIN SERPL BCP-MCNC: 3.5 G/DL (ref 3.2–4.9)
ALBUMIN/GLOB SERPL: 0.9 G/DL
ALP SERPL-CCNC: 897 U/L (ref 30–99)
ALT SERPL-CCNC: 56 U/L (ref 2–50)
ANION GAP SERPL CALC-SCNC: 11 MMOL/L (ref 7–16)
AST SERPL-CCNC: 51 U/L (ref 12–45)
BASOPHILS # BLD AUTO: 2 % (ref 0–1.8)
BASOPHILS # BLD: 0.16 K/UL (ref 0–0.12)
BILIRUB SERPL-MCNC: 2 MG/DL (ref 0.1–1.5)
BUN SERPL-MCNC: 21 MG/DL (ref 8–22)
CALCIUM ALBUM COR SERPL-MCNC: 9.1 MG/DL (ref 8.5–10.5)
CALCIUM SERPL-MCNC: 8.7 MG/DL (ref 8.4–10.2)
CHLORIDE SERPL-SCNC: 99 MMOL/L (ref 96–112)
CO2 SERPL-SCNC: 23 MMOL/L (ref 20–33)
CREAT SERPL-MCNC: 1.41 MG/DL (ref 0.5–1.4)
EOSINOPHIL # BLD AUTO: 0.14 K/UL (ref 0–0.51)
EOSINOPHIL NFR BLD: 1.7 % (ref 0–6.9)
ERYTHROCYTE [DISTWIDTH] IN BLOOD BY AUTOMATED COUNT: 51.2 FL (ref 35.9–50)
ETHANOL BLD-MCNC: <10.1 MG/DL
GFR SERPLBLD CREATININE-BSD FMLA CKD-EPI: 41 ML/MIN/1.73 M 2
GLOBULIN SER CALC-MCNC: 3.9 G/DL (ref 1.9–3.5)
GLUCOSE SERPL-MCNC: 407 MG/DL (ref 65–99)
HCT VFR BLD AUTO: 32.7 % (ref 37–47)
HEMOCCULT STL QL: NEGATIVE
HGB BLD-MCNC: 10.6 G/DL (ref 12–16)
IMM GRANULOCYTES # BLD AUTO: 0.05 K/UL (ref 0–0.11)
IMM GRANULOCYTES NFR BLD AUTO: 0.6 % (ref 0–0.9)
LIPASE SERPL-CCNC: 16 U/L (ref 7–58)
LYMPHOCYTES # BLD AUTO: 1 K/UL (ref 1–4.8)
LYMPHOCYTES NFR BLD: 12.2 % (ref 22–41)
MCH RBC QN AUTO: 30.7 PG (ref 27–33)
MCHC RBC AUTO-ENTMCNC: 32.4 G/DL (ref 33.6–35)
MCV RBC AUTO: 94.8 FL (ref 81.4–97.8)
MONOCYTES # BLD AUTO: 0.9 K/UL (ref 0–0.85)
MONOCYTES NFR BLD AUTO: 11 % (ref 0–13.4)
NEUTROPHILS # BLD AUTO: 5.92 K/UL (ref 2–7.15)
NEUTROPHILS NFR BLD: 72.5 % (ref 44–72)
NRBC # BLD AUTO: 0 K/UL
NRBC BLD-RTO: 0 /100 WBC
PLATELET # BLD AUTO: 393 K/UL (ref 164–446)
PMV BLD AUTO: 10.1 FL (ref 9–12.9)
POTASSIUM SERPL-SCNC: 4.4 MMOL/L (ref 3.6–5.5)
PROT SERPL-MCNC: 7.4 G/DL (ref 6–8.2)
RBC # BLD AUTO: 3.45 M/UL (ref 4.2–5.4)
SODIUM SERPL-SCNC: 133 MMOL/L (ref 135–145)
WBC # BLD AUTO: 8.2 K/UL (ref 4.8–10.8)

## 2023-04-16 PROCEDURE — 700102 HCHG RX REV CODE 250 W/ 637 OVERRIDE(OP): Performed by: EMERGENCY MEDICINE

## 2023-04-16 PROCEDURE — 99284 EMERGENCY DEPT VISIT MOD MDM: CPT

## 2023-04-16 PROCEDURE — 82077 ASSAY SPEC XCP UR&BREATH IA: CPT

## 2023-04-16 PROCEDURE — 80053 COMPREHEN METABOLIC PANEL: CPT

## 2023-04-16 PROCEDURE — 83690 ASSAY OF LIPASE: CPT

## 2023-04-16 PROCEDURE — 700117 HCHG RX CONTRAST REV CODE 255: Performed by: EMERGENCY MEDICINE

## 2023-04-16 PROCEDURE — 82272 OCCULT BLD FECES 1-3 TESTS: CPT

## 2023-04-16 PROCEDURE — 36415 COLL VENOUS BLD VENIPUNCTURE: CPT

## 2023-04-16 PROCEDURE — 85025 COMPLETE CBC W/AUTO DIFF WBC: CPT

## 2023-04-16 PROCEDURE — A9270 NON-COVERED ITEM OR SERVICE: HCPCS | Performed by: EMERGENCY MEDICINE

## 2023-04-16 PROCEDURE — 74177 CT ABD & PELVIS W/CONTRAST: CPT

## 2023-04-16 RX ORDER — AMOXICILLIN AND CLAVULANATE POTASSIUM 875; 125 MG/1; MG/1
1 TABLET, FILM COATED ORAL ONCE
Status: COMPLETED | OUTPATIENT
Start: 2023-04-16 | End: 2023-04-16

## 2023-04-16 RX ORDER — METRONIDAZOLE 500 MG/1
500 TABLET ORAL ONCE
Status: COMPLETED | OUTPATIENT
Start: 2023-04-16 | End: 2023-04-16

## 2023-04-16 RX ORDER — AMOXICILLIN AND CLAVULANATE POTASSIUM 875; 125 MG/1; MG/1
1 TABLET, FILM COATED ORAL 2 TIMES DAILY
Qty: 14 TABLET | Refills: 0 | Status: SHIPPED | OUTPATIENT
Start: 2023-04-16 | End: 2023-04-23

## 2023-04-16 RX ORDER — ETOMIDATE 2 MG/ML
10 INJECTION INTRAVENOUS ONCE
Status: DISCONTINUED | OUTPATIENT
Start: 2023-04-16 | End: 2023-04-16

## 2023-04-16 RX ORDER — METRONIDAZOLE 500 MG/1
500 TABLET ORAL 3 TIMES DAILY
Qty: 21 TABLET | Refills: 0 | Status: SHIPPED | OUTPATIENT
Start: 2023-04-16 | End: 2023-04-23

## 2023-04-16 RX ADMIN — METRONIDAZOLE 500 MG: 500 TABLET ORAL at 20:45

## 2023-04-16 RX ADMIN — AMOXICILLIN AND CLAVULANATE POTASSIUM 1 TABLET: 875; 125 TABLET, FILM COATED ORAL at 20:45

## 2023-04-16 RX ADMIN — IOHEXOL 100 ML: 350 INJECTION, SOLUTION INTRAVENOUS at 20:06

## 2023-04-16 ASSESSMENT — FIBROSIS 4 INDEX: FIB4 SCORE: 1.13

## 2023-04-17 NOTE — DISCHARGE INSTRUCTIONS
Your tests showed some inflammation or infection in your colon and near your rectum.  This may be related to your recent procedure.  Use the antibiotics that we have prescribed, schedule a follow-up with your primary care provider, and return here if you are getting worse instead of gradually better.    Your blood sugar was quite high, so make sure you are using your diabetes medications appropriately.    He did have a small lung nodule.  This is not in any way related to your symptoms, but should be discussed with your primary care provider.

## 2023-04-17 NOTE — ED PROVIDER NOTES
"  ER Provider Note    Scribed for Dr. Trujillo by Vickie Machado. 4/16/2023  6:56 PM    Primary Care Provider: Jarrell Yates M.D.    CHIEF COMPLAINT  Chief Complaint   Patient presents with    Bloody Stools     Reports noting \"black clots that I'm passing\" in her stool x approx 3 days. Reports emesis as well, but denies noting blood in this. Reports she has liver disease but unsure what dx she has been given- states \"some autoimmune liver disease\".     Abdominal Pain     Bilat lower abd     EXTERNAL RECORDS REVIEWED  Outpatient Notes Patient is followed by GI at [facility] and by [facility] license of [facility] in [place] for Primary Biliary Cholangitis    HPI/ROS  LIMITATION TO HISTORY   Select: : None  OUTSIDE HISTORIAN(S):  None    Anu Manuel is a 68 y.o. female who presents to the ED complaining of hematochezia onset a couple days ago. Per patient, she describes her feces as looking \"flaky and funky looking.\" She notes the blood is mixed into her feces. The patient notes that she had a colonoscopy done recently. She has associated dysuria, sticky urine, nausea, vomiting, generalized lower abdominal pain and burning while pooping, but denies fevers or chills. The patient notes, however, that her nausea and vomiting is not new and is related to her liver disease, but has increased in severity with her hematochezia. No alleviating or exacerbating factors reported. . Denies being on any blood thinners. Denies a previous diagnosis of diverticulitis.       PAST MEDICAL HISTORY  Past Medical History:   Diagnosis Date    Adverse effect of anesthesia     in 2008 \"throat closes up\"\"anxiety\" ?laryngospasm, kept in ICU. Pt states no problems currently 2018.     Anemia     Anesthesia     in 2008 \"throat closes up\"\"anxiety\" ?laryngospasm, kept in ICU. Pt states no problems currently 2018.     Arthritis     right shoulder, hands    Bowel habit changes More frequent    Cataract 2022    Cigarette smoker quit 2013    Dental disorder     missing " "teeth; Partial plate on top    depression 08/30/2016    denies depression, states has anxiety and panic attacks    Diabetes mellitus type 1 (HCC) 1989    insulin    Dialysis patient (HCC) Short time due to a kidney injury from a infection    Encounter for long-term (current) use of insulin (HCC) 09/25/2013    Heart burn     High cholesterol     Hypertension     Hypothyroidism, postsurgical 1970    Indigestion     Infectious disease      had hepatitis C, tested neg.    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) 06/10/2015       SURGICAL HISTORY  Past Surgical History:   Procedure Laterality Date    LA ERCP,DIAGNOSTIC N/A 01/14/2022    Procedure: ERCP, DIAGNOSTIC - WITH SIGMOID COLON BIOPSY AND STENT REMOVAL;  Surgeon: Cr Haro M.D.;  Location: [facility];  Service: Gastroenterology    THADDEUS BY LAPAROSCOPY N/A 10/28/2021    Procedure: CHOLECYSTECTOMY, LAPAROSCOPIC;  Surgeon: Tello Trammell M.D.;  Location: [facility];  Service: General    LA ERCP,DIAGNOSTIC N/A 10/26/2021    Procedure: ERCP (ENDOSCOPIC RETROGRADE CHOLANGIOPANCREATOGRAPHY);  Surgeon: Cr Haro M.D.;  Location: SURGERY SAME DAY [facility];  Service: Gastroenterology    LA UPPER GI ENDOSCOPY,BIOPSY N/A 10/26/2021    Procedure: GASTROSCOPY, WITH BIOPSY;  Surgeon: Cr Haro M.D.;  Location: SURGERY SAME DAY [facility];  Service: Gastroenterology    LA UPPER GI ENDOSCOPY,DIAGNOSIS N/A 10/26/2021    Procedure: GASTROSCOPY;  Surgeon: Cr Haro M.D.;  Location: SURGERY SAME DAY [facility];  Service: Gastroenterology    CATH PLACEMENT  01/25/2020    Procedure: INSERTION, CATHETER PERM;  Surgeon: Rola Mendoza M.D.;  Location: [facility]" ORS;  Service: General    IRRIGATION & DEBRIDEMENT NEURO  01/19/2020    Procedure: IRRIGATION AND DEBRIDEMENT, WOUND THORACIC AND LUMBAR;  Surgeon: Ryan Roman M.D.;  Location: SURGERY [facility];  Service: Neurosurgery    PB IMPLANT NEUROSTIM/ N/A 12/16/2019    Procedure: EXPLORATION AT THORACIC 8 - 9, REPLACEMENT OF  NEUROSTIMULATOR LEAD;  Surgeon: Ryan Roman M.D.;  Location: SURGERY [facility];  Service: Neurosurgery    SPINAL CORD STIMULATOR N/A 10/26/2018    Procedure: SPINAL CORD STIMULATOR;  Surgeon: Ryan Roman M.D.;  Location: SURGERY [facility];  Service: Neurosurgery    THORACIC LAMINECTOMY N/A 10/26/2018    Procedure: THORACIC LAMINECTOMY - FOR;  Surgeon: Ryan Roman M.D.;  Location: [facility];  Service: Neurosurgery    MI NJX AA&/STRD TFRML EPI LUMBAR/SACRAL 1 LEVEL Right 08/31/2016    Procedure: INJ-FORAMEN EPI LUM/SAC SNGL L4-5;  Surgeon: Sukhi Godfrey M.D.;  Location: [facility];  Service: Pain Management    LUMBAR LAMINECTOMY DISKECTOMY Right 05/10/2016    Procedure: LUMBAR L4-5 HEMILAMINECTOMY DISKECTOMY ;  Surgeon: Arnold Keyes M.D.;  Location: [facility];  Service:     CERVICAL FUSION POSTERIOR  01/16/2009    Performed by TARA CONTRERAS at [facility]    HARDWARE REMOVAL NEURO  01/16/2009    Performed by TARA CONTRERAS at [facility]    NECK EXPLORATION  01/16/2009    Performed by TARA CONTRERAS at [facility]    SHOULDER ARTHROSCOPY W/ ROTATOR CUFF REPAIR  10/09/2008    Performed by SHERLY CASTANEDA at [facility]    SHOULDER DECOMPRESSION ARTHROSCOPIC  06/17/2008    Performed by SHERLY CASTANEDA at [facility]    CLAVICLE DISTAL EXCISION  06/17/2008    Performed by SHERLY CASTANEDA at [facility]    CERVICAL DISK AND FUSION ANTERIOR  03/12/2008    HYSTERECTOMY, VAGINAL  2006    APPENDECTOMY  2004    THYROID LOBECTOMY  1973     TONSILLECTOMY  1963    LUMPECTOMY  1976, 2005    Breast     LUMPECTOMY      OTHER ABDOMINAL SURGERY  2004    OTHER CARDIAC SURGERY  2020 stents inserted       FAMILY HISTORY  Family History   Problem Relation Age of Onset    Hypertension Mother     Cancer Father        SOCIAL HISTORY   reports that she has been smoking cigarettes. She has a 15.00 pack-year smoking history. She has never used smokeless tobacco. She reports that she does not currently use alcohol. She reports that she does not currently use drugs after having used the following drugs: Inhaled and Oral.    CURRENT MEDICATIONS  Discharge Medication List as of 4/16/2023  8:49 PM        CONTINUE these medications which have NOT CHANGED    Details   Insulin Glargine, 1 Unit Dial, (TOUJEO SOLOSTAR) 300 UNIT/ML Solution Pen-injector Inject 45 Units under the skin at bedtime., Disp-13.5 mL, R-3, Normal      Continuous Blood Gluc Transmit (DEXCOM G6 TRANSMITTER) Misc Change every 3 months, Disp-1 Each, R-3, Normal      insulin aspart (NOVOLOG FLEXPEN) 100 UNIT/ML injection PEN Inject 15 Units under the skin 3 times a day before meals., Disp-45 mL, R-3, Normal      riFAMPin (RIFADINE) 300 MG Cap Take 300 Capsules by mouth 2 times a day., Historical Med      oxyCODONE-acetaminophen (PERCOCET) 7.5-325 MG per tablet Take  by mouth as needed., Historical Med      cyanocobalamin (VITAMIN B12) 2500 MCG SL Tab Place  under the tongue., Historical Med      ondansetron (ZOFRAN ODT) 8 MG TABLET DISPERSIBLE Take 8 mg by mouth 3 times a day as needed., Historical Med      ursodiol (ACTIGALL) 300 MG Cap Take 300 mg by mouth 3 times a day., Historical Med      Vitamin D, Ergocalciferol, 50 MCG (2000 UT) Cap Take 5,000 Units by mouth every day., Historical Med      Continuous Blood Gluc  (DEXCOM G6 ) Device Use continuously to measure blood sugar, Disp-1 Each, R-0, Normal      Continuous Blood Gluc Sensor (DEXCOM G6 SENSOR) Misc 1 Each every 10 days.Will  "replace the John system to go with the Tandem pumpDisp-9 Each, R-3, Normal      clindamycin (CLEOCIN) 1 % Solution APPLY TO AFFECTED TWICE A DAY FOR 1-2 WEEKS, Disp-60 mL, R-1, Normal      SYNTHROID 125 MCG Tab Take 250 mcg by mouth every day., JAGDISH, Historical Med      potassium chloride (MICRO-K) 10 MEQ capsule Take 10 mEq by mouth every day., Historical Med      pravastatin (PRAVACHOL) 40 MG tablet Take 1 Tablet by mouth every evening., Disp-90 Tablet, R-3, Normal      hydroCHLOROthiazide (HYDRODIURIL) 25 MG Tab Take 25 mg by mouth every day., Historical Med      !! metoprolol SR (TOPROL XL) 50 MG TABLET SR 24 HR 50 mg every morning., Historical Med      !! metoprolol SR (TOPROL XL) 100 MG TABLET SR 24  mg every morning., Historical Med      ezetimibe (ZETIA) 10 MG Tab 10 mg every evening., Historical Med      traZODone (DESYREL) 100 MG Tab Take 100 mg by mouth at bedtime., Historical Med       !! - Potential duplicate medications found. Please discuss with provider.          ALLERGIES  Tape    PHYSICAL EXAM  BP (!) 153/87   Pulse 80   Temp 36.4 °C (97.5 °F) (Temporal)   Resp 20   Ht 1.676 m (5' 6\")   Wt 55.2 kg (121 lb 11.1 oz)   LMP 04/29/2005 (LMP Unknown)   SpO2 100%   BMI 19.64 kg/m²   Pulse ox interpretation: I interpret this pulse ox as normal.  Constitutional: Alert in no apparent distress.  HENT: No signs of trauma, Bilateral external ears normal, Nose normal.   Eyes: Conjunctiva normal, Non-icteric.   Neck: Normal range of motion, Supple, No stridor.   Lymphatic: No lymphadenopathy noted.   Cardiovascular: Regular rate and rhythm, no murmurs.   Thorax & Lungs: Normal breath sounds, No respiratory distress, No wheezing  Abdomen: Bowel sounds normal, Soft, Slight distension; mild diffused tenderness crossing the midline , No masses, No pulsatile masses. No peritoneal signs.  Skin: Warm, Dry, No erythema, No rash.   Extremities: Intact distal pulses, No edema, No cyanosis.  Musculoskeletal: " Good range of motion in all major joints. No or major deformities noted.   Neurologic: Alert , Normal motor function, Normal sensory function, No focal deficits noted.   Psychiatric: Affect normal, Judgment normal, Mood normal.       DIAGNOSTIC STUDIES    Labs:   Results for orders placed or performed during the hospital encounter of 04/16/23   CBC WITH DIFFERENTIAL   Result Value Ref Range    WBC 8.2 4.8 - 10.8 K/uL    RBC 3.45 (L) 4.20 - 5.40 M/uL    Hemoglobin 10.6 (L) 12.0 - 16.0 g/dL    Hematocrit 32.7 (L) 37.0 - 47.0 %    MCV 94.8 81.4 - 97.8 fL    MCH 30.7 27.0 - 33.0 pg    MCHC 32.4 (L) 33.6 - 35.0 g/dL    RDW 51.2 (H) 35.9 - 50.0 fL    Platelet Count 393 164 - 446 K/uL    MPV 10.1 9.0 - 12.9 fL    Neutrophils-Polys 72.50 (H) 44.00 - 72.00 %    Lymphocytes 12.20 (L) 22.00 - 41.00 %    Monocytes 11.00 0.00 - 13.40 %    Eosinophils 1.70 0.00 - 6.90 %    Basophils 2.00 (H) 0.00 - 1.80 %    Immature Granulocytes 0.60 0.00 - 0.90 %    Nucleated RBC 0.00 /100 WBC    Neutrophils (Absolute) 5.92 2.00 - 7.15 K/uL    Lymphs (Absolute) 1.00 1.00 - 4.80 K/uL    Monos (Absolute) 0.90 (H) 0.00 - 0.85 K/uL    Eos (Absolute) 0.14 0.00 - 0.51 K/uL    Baso (Absolute) 0.16 (H) 0.00 - 0.12 K/uL    Immature Granulocytes (abs) 0.05 0.00 - 0.11 K/uL    NRBC (Absolute) 0.00 K/uL   CMP   Result Value Ref Range    Sodium 133 (L) 135 - 145 mmol/L    Potassium 4.4 3.6 - 5.5 mmol/L    Chloride 99 96 - 112 mmol/L    Co2 23 20 - 33 mmol/L    Anion Gap 11.0 7.0 - 16.0    Glucose 407 (H) 65 - 99 mg/dL    Bun 21 8 - 22 mg/dL    Creatinine 1.41 (H) 0.50 - 1.40 mg/dL    Calcium 8.7 8.4 - 10.2 mg/dL    AST(SGOT) 51 (H) 12 - 45 U/L    ALT(SGPT) 56 (H) 2 - 50 U/L    Alkaline Phosphatase 897 (H) 30 - 99 U/L    Total Bilirubin 2.0 (H) 0.1 - 1.5 mg/dL    Albumin 3.5 3.2 - 4.9 g/dL    Total Protein 7.4 6.0 - 8.2 g/dL    Globulin 3.9 (H) 1.9 - 3.5 g/dL    A-G Ratio 0.9 g/dL   LIPASE   Result Value Ref Range    Lipase 16 7 - 58 U/L   OCCULT BLOOD STOOL    Result Value Ref Range    Occult Blood Feces Negative Negative   ETHYL ALCOHOL (BLOOD)   Result Value Ref Range    Diagnostic Alcohol <10.1 <10.1 mg/dL   CORRECTED CALCIUM   Result Value Ref Range    Correct Calcium 9.1 8.5 - 10.5 mg/dL   ESTIMATED GFR   Result Value Ref Range    GFR (CKD-EPI) 41 (A) >60 mL/min/1.73 m 2        Radiology:   The attending emergency physician has independently interpreted the diagnostic imaging associated with this visit and am waiting the final reading from the radiologist.   Preliminary interpretation is a follows: No obvious perforation or abscess  Radiologist interpretation:   CT-ABDOMEN-PELVIS WITH   Final Result         1.  Sigmoid and rectal wall thickening, appearance suggests component of proctocolitis.   2.  Hepatomegaly   3.  Atherosclerosis and atherosclerotic coronary artery disease   4.  Pulmonary nodule, see nodule follow-up recommendations below.      Fleischner Society pulmonary nodule recommendations:   Low Risk: CT at 6-12 months, then consider CT at 18-24 months      High Risk: CT at 6-12 months, then CT at 18-24 months      Low Risk - Minimal or absent history of smoking and of other known risk factors.      High Risk - History of smoking or of other known risk factors.      Note: These recommendations do not apply to lung cancer screening, patients with immunosuppression, or patients with known primary cancer.      Fleischner Society 2017 Guidelines for Management of Incidentally Detected Pulmonary Nodules in Adults              COURSE & MEDICAL DECISION MAKING     ED Observation Status? Yes; I am placing the patient in to an observation status due to a diagnostic uncertainty as well as therapeutic intensity. Patient placed in observation status at 7:00 PM, 4/16/2023.     Observation plan is as follows: Recheck patient to see if symptoms are improved.      Upon Reevaluation, the patient's condition has: Improved; and will be discharged.    Patient discharged  from ED Observation status at 8:40 PM (Time) 04/16/23  (Date).     INITIAL ASSESSMENT, COURSE AND PLAN  Care Narrative:     6:54 PM - Patient evaluated at bedside. I informed the patient that I will order labs and radiology to evaluate. Patient verbalizes understanding and agreement to this plan of care. Ordered Ethyl Alcohol (Blood), Occult Blood Stool, Diagnostic Alcohol, Lipase, CMP, CBC w/ Diff, and CT Abdomen Pelvis With to evaluate. Differential diagnoses include but not limited to: Post biopsy bleeding, diverticulosis, diverticulitis, internal hemorrhoid, seems less likely due to dark blood       8:37 PM - Patient evaluated at bedside. I informed the patient that her tests showed proctocolitis. this may or may not be related to her recent colonoscopy with biopsies.  Her blood sugar is high, but she is a known diabetic, and has not taken her nighttime insulin.  Her total bilirubin is consistent with her baseline, and known hepatobiliary disease.  She is to use antibiotics that we have prescribed, schedule a follow up with her primary care provider, and return to the ED if she is getting worse instead of gradually getting better. Her blood sugar was also very high, so I informed her to make sure she is taking her diabetes medication appropriately. Moreover, the patient also had a small lung nodule, which is not in anyway related to her symptoms but should be discussed with her primary care provider. I then informed the patient of plans for discharge. Patient had the opportunity to ask any questions. The plan for discharge was discussed with them and she was told to return for any new or worsening symptoms. She was also informed of the plans for follow up. Patient is understanding and agreeable to the plan for discharge.        DISPOSITION AND DISCUSSIONS    Escalation of care considered, and ultimately not performed: IV fluids and acute inpatient care management, however at this time, the patient is most  appropriate for outpatient management.    Decision tools and prescription drugs considered including, but not limited to: Antibiotics were prescribed at discharge for proctocolitis .    The patient will return for new or worsening symptoms and is stable at the time of discharge.    The patient is referred to a primary physician for blood pressure management, diabetic screening, and for all other preventative health concerns.    DISPOSITION:  Patient will be discharged home in stable condition.    FOLLOW UP:  Jarrell Yates M.D.  [address]  [address]  [place] [address]  [phone]    Schedule an appointment as soon as possible for a visit       [facility], Emergency Dept  [address]  [place] [address] [phone]    If symptoms worsen      OUTPATIENT MEDICATIONS:  Discharge Medication List as of 4/16/2023  8:49 PM        START taking these medications    Details   amoxicillin-clavulanate (AUGMENTIN) 875-125 MG Tab Take 1 Tablet by mouth 2 times a day for 7 days., Disp-14 Tablet, R-0, Normal      metroNIDAZOLE (FLAGYL) 500 MG Tab Take 1 Tablet by mouth 3 times a day for 7 days., Disp-21 Tablet, R-0, Normal              FINAL DIANGOSIS  1. Proctocolitis         Vickie MCKEON (Susan), am scribing for, and in the presence of, Wilman Trujillo M.D..    Electronically signed by: Vickie Machado (Susan), 4/16/2023    IWilman M.D. personally performed the services described in this documentation, as scribed by Vickie Machado in my presence, and it is both accurate and complete. C       The note accurately reflects work and decisions made by me.  Wilman Trujillo M.D.  4/16/2023  10:37 PM

## 2023-04-17 NOTE — ED NOTES
Pt D/C to home. IV removed. D/C instructions and prescriptions given. Pt v/u. Pt leaves ED with no acute changes, complaints or concerns.

## 2023-04-28 ENCOUNTER — HOSPITAL ENCOUNTER (EMERGENCY)
Facility: MEDICAL CENTER | Age: 68
End: 2023-04-29
Attending: EMERGENCY MEDICINE
Payer: MEDICARE

## 2023-04-28 DIAGNOSIS — R14.0 ABDOMINAL DISTENSION: ICD-10-CM

## 2023-04-28 DIAGNOSIS — K76.9 CHRONIC LIVER DISEASE: ICD-10-CM

## 2023-04-28 DIAGNOSIS — N18.9 CHRONIC KIDNEY DISEASE, UNSPECIFIED CKD STAGE: ICD-10-CM

## 2023-04-28 DIAGNOSIS — R19.5 LOOSE STOOLS: ICD-10-CM

## 2023-04-28 PROCEDURE — 99284 EMERGENCY DEPT VISIT MOD MDM: CPT

## 2023-04-28 RX ORDER — ONDANSETRON 2 MG/ML
4 INJECTION INTRAMUSCULAR; INTRAVENOUS ONCE
Status: COMPLETED | OUTPATIENT
Start: 2023-04-29 | End: 2023-04-29

## 2023-04-28 ASSESSMENT — FIBROSIS 4 INDEX: FIB4 SCORE: 1.14

## 2023-04-29 ENCOUNTER — APPOINTMENT (OUTPATIENT)
Dept: RADIOLOGY | Facility: MEDICAL CENTER | Age: 68
End: 2023-04-29
Attending: EMERGENCY MEDICINE
Payer: MEDICARE

## 2023-04-29 VITALS
BODY MASS INDEX: 20.12 KG/M2 | HEART RATE: 81 BPM | SYSTOLIC BLOOD PRESSURE: 147 MMHG | OXYGEN SATURATION: 96 % | WEIGHT: 125.22 LBS | TEMPERATURE: 98.1 F | RESPIRATION RATE: 21 BRPM | DIASTOLIC BLOOD PRESSURE: 76 MMHG | HEIGHT: 66 IN

## 2023-04-29 LAB
ALBUMIN SERPL BCP-MCNC: 3.1 G/DL (ref 3.2–4.9)
ALBUMIN/GLOB SERPL: 0.8 G/DL
ALP SERPL-CCNC: 884 U/L (ref 30–99)
ALT SERPL-CCNC: 35 U/L (ref 2–50)
ANION GAP SERPL CALC-SCNC: 12 MMOL/L (ref 7–16)
AST SERPL-CCNC: 51 U/L (ref 12–45)
BASOPHILS # BLD AUTO: 1.6 % (ref 0–1.8)
BASOPHILS # BLD: 0.14 K/UL (ref 0–0.12)
BILIRUB SERPL-MCNC: 3.7 MG/DL (ref 0.1–1.5)
BUN SERPL-MCNC: 27 MG/DL (ref 8–22)
CALCIUM ALBUM COR SERPL-MCNC: 9 MG/DL (ref 8.5–10.5)
CALCIUM SERPL-MCNC: 8.3 MG/DL (ref 8.4–10.2)
CHLORIDE SERPL-SCNC: 97 MMOL/L (ref 96–112)
CO2 SERPL-SCNC: 19 MMOL/L (ref 20–33)
CREAT SERPL-MCNC: 1.65 MG/DL (ref 0.5–1.4)
EOSINOPHIL # BLD AUTO: 0.18 K/UL (ref 0–0.51)
EOSINOPHIL NFR BLD: 2 % (ref 0–6.9)
ERYTHROCYTE [DISTWIDTH] IN BLOOD BY AUTOMATED COUNT: 52.4 FL (ref 35.9–50)
GFR SERPLBLD CREATININE-BSD FMLA CKD-EPI: 34 ML/MIN/1.73 M 2
GLOBULIN SER CALC-MCNC: 3.7 G/DL (ref 1.9–3.5)
GLUCOSE SERPL-MCNC: 338 MG/DL (ref 65–99)
HCT VFR BLD AUTO: 29 % (ref 37–47)
HGB BLD-MCNC: 9.4 G/DL (ref 12–16)
IMM GRANULOCYTES # BLD AUTO: 0.09 K/UL (ref 0–0.11)
IMM GRANULOCYTES NFR BLD AUTO: 1 % (ref 0–0.9)
INR PPP: 1.58 (ref 0.87–1.13)
LACTATE SERPL-SCNC: 0.6 MMOL/L (ref 0.5–2)
LIPASE SERPL-CCNC: 9 U/L (ref 7–58)
LYMPHOCYTES # BLD AUTO: 1.09 K/UL (ref 1–4.8)
LYMPHOCYTES NFR BLD: 12.2 % (ref 22–41)
MCH RBC QN AUTO: 30.9 PG (ref 27–33)
MCHC RBC AUTO-ENTMCNC: 32.4 G/DL (ref 33.6–35)
MCV RBC AUTO: 95.4 FL (ref 81.4–97.8)
MONOCYTES # BLD AUTO: 1.04 K/UL (ref 0–0.85)
MONOCYTES NFR BLD AUTO: 11.7 % (ref 0–13.4)
NEUTROPHILS # BLD AUTO: 6.36 K/UL (ref 2–7.15)
NEUTROPHILS NFR BLD: 71.5 % (ref 44–72)
NRBC # BLD AUTO: 0 K/UL
NRBC BLD-RTO: 0 /100 WBC
PLATELET # BLD AUTO: 423 K/UL (ref 164–446)
PMV BLD AUTO: 10.9 FL (ref 9–12.9)
POTASSIUM SERPL-SCNC: 4.3 MMOL/L (ref 3.6–5.5)
PROT SERPL-MCNC: 6.8 G/DL (ref 6–8.2)
PROTHROMBIN TIME: 18.6 SEC (ref 12–14.6)
RBC # BLD AUTO: 3.04 M/UL (ref 4.2–5.4)
SODIUM SERPL-SCNC: 128 MMOL/L (ref 135–145)
WBC # BLD AUTO: 8.9 K/UL (ref 4.8–10.8)

## 2023-04-29 PROCEDURE — 83690 ASSAY OF LIPASE: CPT

## 2023-04-29 PROCEDURE — 700105 HCHG RX REV CODE 258: Performed by: EMERGENCY MEDICINE

## 2023-04-29 PROCEDURE — 80053 COMPREHEN METABOLIC PANEL: CPT

## 2023-04-29 PROCEDURE — 700117 HCHG RX CONTRAST REV CODE 255: Performed by: EMERGENCY MEDICINE

## 2023-04-29 PROCEDURE — 85025 COMPLETE CBC W/AUTO DIFF WBC: CPT

## 2023-04-29 PROCEDURE — 96374 THER/PROPH/DIAG INJ IV PUSH: CPT

## 2023-04-29 PROCEDURE — 700111 HCHG RX REV CODE 636 W/ 250 OVERRIDE (IP): Performed by: EMERGENCY MEDICINE

## 2023-04-29 PROCEDURE — 36415 COLL VENOUS BLD VENIPUNCTURE: CPT

## 2023-04-29 PROCEDURE — 74177 CT ABD & PELVIS W/CONTRAST: CPT

## 2023-04-29 PROCEDURE — 85610 PROTHROMBIN TIME: CPT

## 2023-04-29 PROCEDURE — 83605 ASSAY OF LACTIC ACID: CPT

## 2023-04-29 RX ORDER — VANCOMYCIN HYDROCHLORIDE 125 MG/1
125 CAPSULE ORAL 4 TIMES DAILY
Qty: 40 CAPSULE | Refills: 0 | Status: SHIPPED | OUTPATIENT
Start: 2023-04-29 | End: 2023-05-09

## 2023-04-29 RX ORDER — SUCRALFATE 1 G/1
1 TABLET ORAL
Qty: 40 TABLET | Refills: 0 | Status: SHIPPED | OUTPATIENT
Start: 2023-04-29 | End: 2023-05-09

## 2023-04-29 RX ORDER — SODIUM CHLORIDE 9 MG/ML
1000 INJECTION, SOLUTION INTRAVENOUS ONCE
Status: COMPLETED | OUTPATIENT
Start: 2023-04-29 | End: 2023-04-29

## 2023-04-29 RX ORDER — FAMOTIDINE 20 MG/1
20 TABLET, FILM COATED ORAL DAILY
Qty: 10 TABLET | Refills: 0 | Status: SHIPPED | OUTPATIENT
Start: 2023-04-29 | End: 2023-05-09

## 2023-04-29 RX ADMIN — ONDANSETRON 4 MG: 2 INJECTION INTRAMUSCULAR; INTRAVENOUS at 00:08

## 2023-04-29 RX ADMIN — SODIUM CHLORIDE 1000 ML: 9 INJECTION, SOLUTION INTRAVENOUS at 00:13

## 2023-04-29 RX ADMIN — IOHEXOL 100 ML: 350 INJECTION, SOLUTION INTRAVENOUS at 00:56

## 2023-04-29 NOTE — ED NOTES
Pt. Verbalizes understanding of discharge instructions. Accompanied to lobby with friend. Pt. Alert/awake in NAD.   All questions answered and understood. Advised to ff-up with her PCP.

## 2023-04-29 NOTE — ED TRIAGE NOTES
"Bib girlfriend for above complaints.     Chief Complaint   Patient presents with    Rectal Bleeding     Pt was seen couple weeks ago for same. Pt states they thought it was infection and given abx. States the abx helped but pain and bleeding has returned. Pt endorses black tarry looking stools. Started this morning. Endorses nausea and vomiting, denies bloody or coffee ground emesis.      /78   Pulse 83   Temp 36.5 °C (97.7 °F)   Resp 18   Ht 1.676 m (5' 6\")   Wt 56.8 kg (125 lb 3.5 oz)   LMP 04/29/2005 (LMP Unknown)   SpO2 97%   Breastfeeding No   BMI 20.21 kg/m²     "

## 2023-04-29 NOTE — ED PROVIDER NOTES
ED Provider Note    CHIEF COMPLAINT  Chief Complaint   Patient presents with    Rectal Bleeding     Pt was seen couple weeks ago for same. Pt states they thought it was infection and given abx. States the abx helped but pain and bleeding has returned. Pt endorses black tarry looking stools. Started this morning. Endorses nausea and vomiting, denies bloody or coffee ground emesis.        EXTERNAL RECORDS REVIEWED  Outpatient Notes Patient is followed by GI at [facility] and by [facility] in [place] for Primary Biliary Cholangitis.  Patient has a history of RHEA, follow-up with Dr. Puentes nephrology in the past.  I her ER visits both in January, February and March all 4 nausea, vomiting    HPI/ROS  LIMITATION TO HISTORY   Select: : None  OUTSIDE HISTORIAN(S):  none    Anu Manuel is a 68 y.o. female with a history of diabetes, liver dysfunction, CKD, who presents to the emergency room for evaluation of ongoing abdominal discomfort, some rectal bleeding has been present and worsening over the last week along with abdominal distention.  Patient says that she was seen in April for similar complaints, she had a diagnosis on CT scan of acute infection and was taking antibiotics.  She had some near complete alleviation of the symptoms thereafter however over the last week she feels like she has had increasing amounts of lower abdominal discomfort.  She says she is passing what appears to be dark flaky components in her poop and is associated with ongoing pain, worsening abdominal distention.  She has longstanding primary biliary cholangitis and is taking medications and this is followed by physicians at [facility].  There has been no recent changes, no recent fevers or chills, no recent urinary symptoms.  She currently has some more secretions with her tears and urine as she is on rifampin.    Chart Review again shows Patient was seen on 4/16 for similar complaints.  At that time she had slight LFT elevations that not appear  grossly changed from prior, CT scan at that time that showed sigmoid and rectal wall thickening suggesting proctocolitis.    PAST MEDICAL HISTORY   has a past medical history of Adverse effect of anesthesia, Anemia, Anesthesia, Arthritis, Bowel habit changes (More frequent), Cataract (2022), Cigarette smoker (quit 2013), Dental disorder, depression (08/30/2016), Diabetes mellitus type 1 (HCC) (1989), Dialysis patient (HCC) (Short time due to a kidney injury from a infection), Encounter for long-term (current) use of insulin (HCC) (09/25/2013), Heart burn, High cholesterol, Hypertension, Hypothyroidism, postsurgical (1970), Indigestion, Infectious disease, Jaundice (2021 Liver disease), Joint replacement, Liver disease, Liver failure (HCC), Pain, Polyneuropathy in diabetes(357.2) (06/10/2015), Status post appendectomy, and 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    SURGICAL HISTORY   has a past surgical history that includes hysterectomy, vaginal (2006); thyroid lobectomy (1973); lumpectomy (1976, 2005); cervical disk and fusion anterior (03/12/2008); tonsillectomy (1963); cervical fusion posterior (01/16/2009); hardware removal neuro (01/16/2009); neck exploration (01/16/2009); lumpectomy; lumbar laminectomy diskectomy (Right, 05/10/2016); shoulder decompression arthroscopic (06/17/2008); clavicle distal excision (06/17/2008); shoulder arthroscopy w/ rotator cuff repair (10/09/2008); njx aa&/strd tfrml epi lumbar/sacral 1 level (Right, 08/31/2016); spinal cord stimulator (N/A, 10/26/2018); thoracic laminectomy (N/A, 10/26/2018); appendectomy (2004); implant neurostim/ (N/A, 12/16/2019); irrigation & debridement neuro (01/19/2020); cath placement (01/25/2020); ercp,diagnostic (N/A, 10/26/2021); upper gi endoscopy,biopsy (N/A, 10/26/2021); upper gi endoscopy,diagnosis (N/A, 10/26/2021); peter by laparoscopy (N/A, 10/28/2021); ercp,diagnostic  (N/A, 01/14/2022); other cardiac surgery (2020 stents inserted); and other abdominal surgery (2004).    FAMILY HISTORY  Family History   Problem Relation Age of Onset    Hypertension Mother     Cancer Father        SOCIAL HISTORY  Social History     Tobacco Use    Smoking status: Every Day     Packs/day: 0.50     Years: 30.00     Pack years: 15.00     Types: Cigarettes    Smokeless tobacco: Never   Vaping Use    Vaping Use: Never used   Substance and Sexual Activity    Alcohol use: Not Currently    Drug use: Not Currently     Types: Inhaled, Oral     Comment: Marijuana - Occasional; edibles    Sexual activity: Not on file       CURRENT MEDICATIONS  Home Medications       Reviewed by Gonzalo Goldman R.N. (Registered Nurse) on 04/28/23 at 2336  Med List Status: Complete     Medication Last Dose Status   clindamycin (CLEOCIN) 1 % Solution  Active   Continuous Blood Gluc  (DEXCOM G6 ) Device  Active   Continuous Blood Gluc Sensor (DEXCOM G6 SENSOR) Misc  Active   Continuous Blood Gluc Transmit (DEXCOM G6 TRANSMITTER) Misc  Active   cyanocobalamin (VITAMIN B12) 2500 MCG SL Tab  Active   ezetimibe (ZETIA) 10 MG Tab  Active   hydroCHLOROthiazide (HYDRODIURIL) 25 MG Tab 4/27/2023 Active   insulin aspart (NOVOLOG FLEXPEN) 100 UNIT/ML injection PEN 4/28/2023 Active   Insulin Glargine, 1 Unit Dial, (TOUJEO SOLOSTAR) 300 UNIT/ML Solution Pen-injector  Active   metoprolol SR (TOPROL XL) 100 MG TABLET SR 24 HR 4/27/2023 Active   metoprolol SR (TOPROL XL) 50 MG TABLET SR 24 HR  Active   ondansetron (ZOFRAN ODT) 8 MG TABLET DISPERSIBLE 4/28/2023 Active   oxyCODONE-acetaminophen (PERCOCET) 7.5-325 MG per tablet  Active   potassium chloride (MICRO-K) 10 MEQ capsule 4/27/2023 Active   pravastatin (PRAVACHOL) 40 MG tablet 4/27/2023 Active   riFAMPin (RIFADINE) 300 MG Cap  Active   SYNTHROID 125 MCG Tab 4/27/2023 Active   traZODone (DESYREL) 100 MG Tab  Active   ursodiol (ACTIGALL) 300 MG Cap 4/27/2023 Active   Vitamin  "D, Ergocalciferol, 50 MCG (2000 UT) Cap  Active                    ALLERGIES  Allergies   Allergen Reactions    Tape Unspecified and Rash     Blisters, paper tape is ok  Other reaction(s): Rash       PHYSICAL EXAM  VITAL SIGNS: BP (!) 147/76   Pulse 81   Temp 36.7 °C (98.1 °F) (Temporal)   Resp (!) 21   Ht 1.676 m (5' 6\")   Wt 56.8 kg (125 lb 3.5 oz)   LMP 04/29/2005 (LMP Unknown)   SpO2 96%   Breastfeeding No   BMI 20.21 kg/m²    Genl: F sitting in gurney comfortably, speaking clearly, appears in no acute distress   Head: NC/AT   ENT: Mucous membranes moist, posterior pharynx clear, uvula midline, nares patent bilaterally   Eyes: Normal sclera, pupils equal round reactive to light  Neck: Supple, FROM, no LAD appreciated   Pulmonary: Lungs are clear to auscultation bilaterally  Chest: No TTP  CV:  RRR, no murmur appreciated, pulses 2+ in both upper and lower extremities,  Abdomen: soft, abdominal distention, no true rebound or guarding, mild suprapubic discomfort, no masses palpated, no HSM   : no CVA tenderness.  Perirectal tissues are soft, there is no induration, rectal exam shows no thickening, no gross blood or jennifer blood present.  Guaiac is weakly positive  Musculoskeletal: Pain free ROM of the neck. Moving upper and lower extremities and spontaneous in coordinated fashion  Neuro: A&Ox4 (person, place, time, situation), speech fluent, gait steady, no focal deficits appreciated  Psych: Patient has an appropriate affect and behavior  Skin: No rash or lesions.  + jaundice.  No cyanosis.  Warm and dry.     DIAGNOSTIC STUDIES / PROCEDURES    LABS  Labs Reviewed   CBC WITH DIFFERENTIAL - Abnormal; Notable for the following components:       Result Value    RBC 3.04 (*)     Hemoglobin 9.4 (*)     Hematocrit 29.0 (*)     MCHC 32.4 (*)     RDW 52.4 (*)     Lymphocytes 12.20 (*)     Immature Granulocytes 1.00 (*)     Monos (Absolute) 1.04 (*)     Baso (Absolute) 0.14 (*)     All other components within " normal limits    Narrative:     Indicate which anticoagulants the patient is on:->UNKNOWN   COMP METABOLIC PANEL - Abnormal; Notable for the following components:    Sodium 128 (*)     Co2 19 (*)     Glucose 338 (*)     Bun 27 (*)     Creatinine 1.65 (*)     Calcium 8.3 (*)     AST(SGOT) 51 (*)     Alkaline Phosphatase 884 (*)     Total Bilirubin 3.7 (*)     Albumin 3.1 (*)     Globulin 3.7 (*)     All other components within normal limits    Narrative:     Indicate which anticoagulants the patient is on:->UNKNOWN   PROTHROMBIN TIME - Abnormal; Notable for the following components:    PT 18.6 (*)     INR 1.58 (*)     All other components within normal limits    Narrative:     Indicate which anticoagulants the patient is on:->UNKNOWN   ESTIMATED GFR - Abnormal; Notable for the following components:    GFR (CKD-EPI) 34 (*)     All other components within normal limits    Narrative:     Indicate which anticoagulants the patient is on:->UNKNOWN   LIPASE    Narrative:     Indicate which anticoagulants the patient is on:->UNKNOWN   LACTIC ACID    Narrative:     Indicate which anticoagulants the patient is on:->UNKNOWN   CORRECTED CALCIUM    Narrative:     Indicate which anticoagulants the patient is on:->UNKNOWN     RADIOLOGY  I have independently interpreted the diagnostic imaging associated with this visit and am waiting the final reading from the radiologist.   My preliminary interpretation is as follows: No significant fluid collections or obstructive/mechanical changes  Radiologist interpretation:   CT-ABDOMEN-PELVIS WITH   Final Result      1.  Improved wall thickening of the rectum and sigmoid colon.   2.  Hepatomegaly.   3.  Atherosclerotic changes.        COURSE & MEDICAL DECISION MAKING    ED Observation Status? No; Patient does not meet criteria for ED Observation.     INITIAL ASSESSMENT, COURSE AND PLAN  Care Narrative: She presents the emergency room for symptoms as described above.  On initial assessment she  is not tachycardic or hypotensive and she has had a multitude of chronic issues with her ongoing kidney disease, liver disease and has had chronic jaundice for some time.  Rectal exam shows no evidence of gross blood and there is very faint changes on the guaiac board, she has had some epigastric discomfort and has been on a regimen of medications recently that could be the source of possible early esophagitis, gastritis however she is describing some distention though there is no localizing pain, no significant organomegaly she does not appear to be in fulminant liver failure as compared to her previous labs.  Lab work shows that she has some similar chronic elevations of her LFTs with a slight bilirubin elevation.  She has no localizing right upper quadrant discomfort and no clinical findings of obstructive hepatobiliary process.  She is scheduled to follow-up with her established GI doctors as they are working on MRI scans or any further management of her chronic and currently undifferentiated cause of her multiorgan problems.  She feels improved after symptomatic medications and some fluids and at this time we had an extended discussion regarding her prior abdominal infection.  CT scan here shows improved wall thickening of the rectum and sigmoid colon and it is very possible that while she does not have an abscess she could have some side effects of being on recent antibiosis and that her diarrheal illness could be the early signs of C. difficile.  There is very little contraindications to starting oral vancomycin as this is not absorbed and will not likely interact with her other medications at this time.  She is counseled regarding the duration of these medications, some symptomatic medications for some epigastric discomfort and possible early ulcerative disease is also started.  She is given very strict return precautions and discharged home with her established outpatient follow-up    HYDRATION: Based on  the patient's presentation of Other npo status in the setting of possible surgical intervention, the patient was given IV fluids. IV Hydration was used because oral hydration was not adequate alone. Upon recheck following hydration, the patient was improved.    DISPOSITION AND DISCUSSIONS  I have discussed management of the patient with the following physicians and MILAN's:  none  Discussion of management with other QHP or appropriate source(s): None   Decision tools and prescription drugs considered including, but not limited to: Antibiotics oral vancomycin .    FINAL DIAGNOSIS  1. Loose stools    2. Abdominal distension    3. Chronic liver disease    4. Chronic kidney disease, unspecified CKD stage      Electronically signed by: Guillermo Lin M.D., 4/28/2023 11:14 PM

## 2023-04-29 NOTE — DISCHARGE INSTRUCTIONS
Been seen and evaluated for loose stools, perception of possible bloody stools and some abdominal distention.  Your CT scan does not show any acute infections or abscesses.  There is no mechanical obstructions.  You have some chronic changes to your liver enzymes that are elevated, this is unchanged and there is no signs of other concerning electrolyte abnormalities.  As you are recently on antibiotics and you are having this escalation in your symptoms with your abdominal distention and diarrhea it is possible you could have an infection and the recommendation following antibiotic use would be for some oral vancomycin.  This does not get absorbed and cause organ problems and is not interacting with your other medications.  Please take as directed and follow-up with your outpatient physician and your GI specialist.  Return to the emergency department if you are having fevers, worsening abdominal pain or not acting like yourself

## 2023-05-04 ENCOUNTER — OFFICE VISIT (OUTPATIENT)
Dept: URGENT CARE | Facility: CLINIC | Age: 68
End: 2023-05-04
Payer: MEDICARE

## 2023-05-04 VITALS
TEMPERATURE: 97.7 F | RESPIRATION RATE: 16 BRPM | WEIGHT: 131.3 LBS | SYSTOLIC BLOOD PRESSURE: 158 MMHG | OXYGEN SATURATION: 100 % | HEIGHT: 66 IN | BODY MASS INDEX: 21.1 KG/M2 | HEART RATE: 79 BPM | DIASTOLIC BLOOD PRESSURE: 68 MMHG

## 2023-05-04 DIAGNOSIS — T16.2XXA FB EAR, LEFT, INITIAL ENCOUNTER: ICD-10-CM

## 2023-05-04 PROCEDURE — 99212 OFFICE O/P EST SF 10 MIN: CPT | Performed by: PHYSICIAN ASSISTANT

## 2023-05-04 ASSESSMENT — FIBROSIS 4 INDEX: FIB4 SCORE: 1.35

## 2023-05-04 NOTE — PROGRESS NOTES
"Subjective:   Anu Manuel is a 68 y.o. female who presents for Foreign Body in Ear (A portion of her stylus got stuck in her left ear. )      HPI  The patient presents to the Urgent Care with complaints of foreign body to left ear onset yesterday.  She was scratching her left ear with her stylus that had a rubber piece on the end of the stylus.  The rubber piece lodged into the left ear.  Unable to remove herself so she presents today for an evaluation.  She had some mild ear pressure but no significant pain or drainage.  No other symptoms.        Past Medical History:   Diagnosis Date    Adverse effect of anesthesia     in 2008 \"throat closes up\"\"anxiety\" ?laryngospasm, kept in ICU. Pt states no problems currently 2018.     Anemia     Anesthesia     in 2008 \"throat closes up\"\"anxiety\" ?laryngospasm, kept in ICU. Pt states no problems currently 2018.     Arthritis     right shoulder, hands    Bowel habit changes More frequent    Cataract 2022    Cigarette smoker quit 2013    Dental disorder     missing teeth; Partial plate on top    depression 08/30/2016    denies depression, states has anxiety and panic attacks    Diabetes mellitus type 1 (HCC) 1989    insulin    Dialysis patient (HCC) Short time due to a kidney injury from a infection    Encounter for long-term (current) use of insulin (HCC) 09/25/2013    Heart burn     High cholesterol     Hypertension     Hypothyroidism, postsurgical 1970    Indigestion     Infectious disease      had hepatitis C, tested neg.    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   Constitutional:       General: She is not in acute distress.     Appearance: Normal appearance. She is not ill-appearing or toxic-appearing.   HENT:      Right Ear: Tympanic membrane, ear canal and external ear normal.      Left Ear: Tympanic membrane normal.      Ears:      Comments: Black rubber appearing piece just inside the canal easily visible.     Eyes:      Conjunctiva/sclera: Conjunctivae normal.   Cardiovascular:      Rate and Rhythm: Normal rate.   Pulmonary:      Effort: Pulmonary effort is normal.   Musculoskeletal:      Cervical back: Neck supple.   Skin:     General: Skin is warm and dry.   Neurological:      General: No focal deficit present.      Mental Status: She is alert and oriented to person, place, and time.   Psychiatric:         Mood and Affect: Mood normal.         Behavior: Behavior normal.       Diagnosis and associated orders:     1. FB ear, left, initial encounter       Comments/MDM:     MA easily removed FB with alligator forceps due to easy visibility and access. Black rubber piece from stylus. Patient tolerated well.  Reexamination showed normal left ear canal and normal TM.  No erythema or edema.  No drainage.  Patient reports no pain.       I personally reviewed prior external notes and test results pertinent to today's visit. Indications for immediate follow-up discussed. Patient expresses understanding and agrees to plan. Patient denies any other questions or concerns.     Follow-up with the primary care physician for recheck, reevaluation, and consideration of further management.    Please note that this dictation was created using " voice recognition software. I have made a reasonable attempt to correct obvious errors, but I expect that there are errors of grammar and possibly content that I did not discover before finalizing the note.    This note was electronically signed by Rojas Mantilla PA-C

## 2023-05-09 ENCOUNTER — HOSPITAL ENCOUNTER (OUTPATIENT)
Dept: LAB | Facility: MEDICAL CENTER | Age: 68
End: 2023-05-09
Attending: INTERNAL MEDICINE
Payer: MEDICARE

## 2023-05-09 DIAGNOSIS — R80.9 NEPHROTIC RANGE PROTEINURIA: ICD-10-CM

## 2023-05-09 DIAGNOSIS — N17.9 AKI (ACUTE KIDNEY INJURY) (HCC): ICD-10-CM

## 2023-05-09 LAB
25(OH)D3 SERPL-MCNC: 45 NG/ML (ref 30–100)
ALBUMIN SERPL BCP-MCNC: 3.2 G/DL (ref 3.2–4.9)
ALBUMIN SERPL BCP-MCNC: 3.3 G/DL (ref 3.2–4.9)
ALBUMIN/GLOB SERPL: 0.8 G/DL
ALP SERPL-CCNC: 1190 U/L (ref 30–99)
ALP SERPL-CCNC: 1259 U/L (ref 30–99)
ALT SERPL-CCNC: 51 U/L (ref 2–50)
ALT SERPL-CCNC: 54 U/L (ref 2–50)
ANION GAP SERPL CALC-SCNC: 13 MMOL/L (ref 7–16)
ANION GAP SERPL CALC-SCNC: 16 MMOL/L (ref 7–16)
AST SERPL-CCNC: 48 U/L (ref 12–45)
AST SERPL-CCNC: 51 U/L (ref 12–45)
BASOPHILS # BLD AUTO: 1.8 % (ref 0–1.8)
BASOPHILS # BLD: 0.15 K/UL (ref 0–0.12)
BILIRUB CONJ SERPL-MCNC: 2.9 MG/DL (ref 0.1–0.5)
BILIRUB INDIRECT SERPL-MCNC: 0.9 MG/DL (ref 0–1)
BILIRUB SERPL-MCNC: 3.7 MG/DL (ref 0.1–1.5)
BILIRUB SERPL-MCNC: 3.8 MG/DL (ref 0.1–1.5)
BUN SERPL-MCNC: 18 MG/DL (ref 8–22)
BUN SERPL-MCNC: 19 MG/DL (ref 8–22)
C3 SERPL-MCNC: 204.7 MG/DL (ref 87–200)
C4 SERPL-MCNC: 26 MG/DL (ref 19–52)
CALCIUM ALBUM COR SERPL-MCNC: 9.3 MG/DL (ref 8.5–10.5)
CALCIUM SERPL-MCNC: 8.5 MG/DL (ref 8.5–10.5)
CALCIUM SERPL-MCNC: 8.7 MG/DL (ref 8.5–10.5)
CHLORIDE SERPL-SCNC: 95 MMOL/L (ref 96–112)
CHLORIDE SERPL-SCNC: 95 MMOL/L (ref 96–112)
CO2 SERPL-SCNC: 22 MMOL/L (ref 20–33)
CO2 SERPL-SCNC: 22 MMOL/L (ref 20–33)
CREAT SERPL-MCNC: 1.17 MG/DL (ref 0.5–1.4)
CREAT SERPL-MCNC: 1.26 MG/DL (ref 0.5–1.4)
CREAT UR-MCNC: 73.11 MG/DL
EOSINOPHIL # BLD AUTO: 0.2 K/UL (ref 0–0.51)
EOSINOPHIL NFR BLD: 2.4 % (ref 0–6.9)
ERYTHROCYTE [DISTWIDTH] IN BLOOD BY AUTOMATED COUNT: 57.4 FL (ref 35.9–50)
ERYTHROCYTE [DISTWIDTH] IN BLOOD BY AUTOMATED COUNT: 58.5 FL (ref 35.9–50)
GFR SERPLBLD CREATININE-BSD FMLA CKD-EPI: 47 ML/MIN/1.73 M 2
GFR SERPLBLD CREATININE-BSD FMLA CKD-EPI: 51 ML/MIN/1.73 M 2
GLOBULIN SER CALC-MCNC: 4.2 G/DL (ref 1.9–3.5)
GLUCOSE SERPL-MCNC: 398 MG/DL (ref 65–99)
GLUCOSE SERPL-MCNC: 407 MG/DL (ref 65–99)
HCT VFR BLD AUTO: 30.6 % (ref 37–47)
HCT VFR BLD AUTO: 30.9 % (ref 37–47)
HGB BLD-MCNC: 9.6 G/DL (ref 12–16)
HGB BLD-MCNC: 9.8 G/DL (ref 12–16)
IMM GRANULOCYTES # BLD AUTO: 0.06 K/UL (ref 0–0.11)
IMM GRANULOCYTES NFR BLD AUTO: 0.7 % (ref 0–0.9)
INR PPP: 0.95 (ref 0.87–1.13)
LYMPHOCYTES # BLD AUTO: 1 K/UL (ref 1–4.8)
LYMPHOCYTES NFR BLD: 11.8 % (ref 22–41)
MCH RBC QN AUTO: 29.8 PG (ref 27–33)
MCH RBC QN AUTO: 30.6 PG (ref 27–33)
MCHC RBC AUTO-ENTMCNC: 31.1 G/DL (ref 33.6–35)
MCHC RBC AUTO-ENTMCNC: 32 G/DL (ref 33.6–35)
MCV RBC AUTO: 95.6 FL (ref 81.4–97.8)
MCV RBC AUTO: 96 FL (ref 81.4–97.8)
MICROALBUMIN UR-MCNC: 390.9 MG/DL
MICROALBUMIN/CREAT UR: 5347 MG/G (ref 0–30)
MONOCYTES # BLD AUTO: 0.83 K/UL (ref 0–0.85)
MONOCYTES NFR BLD AUTO: 9.8 % (ref 0–13.4)
NEUTROPHILS # BLD AUTO: 6.24 K/UL (ref 2–7.15)
NEUTROPHILS NFR BLD: 73.5 % (ref 44–72)
NRBC # BLD AUTO: 0 K/UL
NRBC BLD-RTO: 0 /100 WBC
PLATELET # BLD AUTO: 434 K/UL (ref 164–446)
PLATELET # BLD AUTO: 453 K/UL (ref 164–446)
PMV BLD AUTO: 11.9 FL (ref 9–12.9)
PMV BLD AUTO: 11.9 FL (ref 9–12.9)
POTASSIUM SERPL-SCNC: 3.3 MMOL/L (ref 3.6–5.5)
POTASSIUM SERPL-SCNC: 3.3 MMOL/L (ref 3.6–5.5)
PROT SERPL-MCNC: 7.4 G/DL (ref 6–8.2)
PROT SERPL-MCNC: 7.4 G/DL (ref 6–8.2)
PROTHROMBIN TIME: 12.6 SEC (ref 12–14.6)
RBC # BLD AUTO: 3.2 M/UL (ref 4.2–5.4)
RBC # BLD AUTO: 3.22 M/UL (ref 4.2–5.4)
SODIUM SERPL-SCNC: 130 MMOL/L (ref 135–145)
SODIUM SERPL-SCNC: 133 MMOL/L (ref 135–145)
WBC # BLD AUTO: 8.5 K/UL (ref 4.8–10.8)
WBC # BLD AUTO: 9.6 K/UL (ref 4.8–10.8)

## 2023-05-09 PROCEDURE — 85027 COMPLETE CBC AUTOMATED: CPT

## 2023-05-09 PROCEDURE — 83516 IMMUNOASSAY NONANTIBODY: CPT | Mod: 91

## 2023-05-09 PROCEDURE — 85610 PROTHROMBIN TIME: CPT

## 2023-05-09 PROCEDURE — 85025 COMPLETE CBC W/AUTO DIFF WBC: CPT

## 2023-05-09 PROCEDURE — 83520 IMMUNOASSAY QUANT NOS NONAB: CPT | Mod: 91

## 2023-05-09 PROCEDURE — 80053 COMPREHEN METABOLIC PANEL: CPT

## 2023-05-09 PROCEDURE — 86162 COMPLEMENT TOTAL (CH50): CPT

## 2023-05-09 PROCEDURE — 82239 BILE ACIDS TOTAL: CPT

## 2023-05-09 PROCEDURE — 86160 COMPLEMENT ANTIGEN: CPT | Mod: 91

## 2023-05-09 PROCEDURE — 84165 PROTEIN E-PHORESIS SERUM: CPT

## 2023-05-09 PROCEDURE — 86038 ANTINUCLEAR ANTIBODIES: CPT

## 2023-05-09 PROCEDURE — 82306 VITAMIN D 25 HYDROXY: CPT

## 2023-05-09 PROCEDURE — 80076 HEPATIC FUNCTION PANEL: CPT

## 2023-05-09 PROCEDURE — 36415 COLL VENOUS BLD VENIPUNCTURE: CPT

## 2023-05-09 PROCEDURE — 82570 ASSAY OF URINE CREATININE: CPT

## 2023-05-09 PROCEDURE — 84155 ASSAY OF PROTEIN SERUM: CPT

## 2023-05-09 PROCEDURE — 86256 FLUORESCENT ANTIBODY TITER: CPT

## 2023-05-09 PROCEDURE — 80048 BASIC METABOLIC PNL TOTAL CA: CPT

## 2023-05-09 PROCEDURE — 82043 UR ALBUMIN QUANTITATIVE: CPT

## 2023-05-10 ENCOUNTER — TELEPHONE (OUTPATIENT)
Dept: ENDOCRINOLOGY | Facility: MEDICAL CENTER | Age: 68
End: 2023-05-10

## 2023-05-10 DIAGNOSIS — E10.9 TYPE 1 DIABETES MELLITUS ON INSULIN THERAPY (HCC): ICD-10-CM

## 2023-05-10 RX ORDER — PEN NEEDLE, DIABETIC 32GX 5/32"
NEEDLE, DISPOSABLE MISCELLANEOUS
Qty: 400 EACH | Refills: 3 | Status: SHIPPED | OUTPATIENT
Start: 2023-05-10 | End: 2024-03-20

## 2023-05-10 NOTE — TELEPHONE ENCOUNTER
Patient called in requesting a new prescription for the needle tips for the Novolog flex pen. Patient states that she has not had this prescription through Dr. Paz yet.

## 2023-05-11 LAB — BILE AC SERPL-SCNC: 135 UMOL/L (ref 0–10)

## 2023-05-12 LAB
ALBUMIN SERPL ELPH-MCNC: 3.35 G/DL (ref 3.75–5.01)
ALPHA1 GLOB SERPL ELPH-MCNC: 0.36 G/DL (ref 0.19–0.46)
ALPHA2 GLOB SERPL ELPH-MCNC: 1.01 G/DL (ref 0.48–1.05)
B-GLOBULIN SERPL ELPH-MCNC: 0.89 G/DL (ref 0.48–1.1)
GAMMA GLOB SERPL ELPH-MCNC: 0.89 G/DL (ref 0.62–1.51)
INTERPRETATION SERPL IFE-IMP: ABNORMAL
MONOCLON BAND OBS SERPL: ABNORMAL
MONOCLONAL PROTEIN NL11656: ABNORMAL G/DL
MYELOPEROXIDASE AB SER-ACNC: 7 AU/ML (ref 0–19)
PATHOLOGY STUDY: ABNORMAL
PROT SERPL-MCNC: 6.5 G/DL (ref 6.3–8.2)
PROTEINASE3 AB SER-ACNC: 0 AU/ML (ref 0–19)

## 2023-05-16 ENCOUNTER — OFFICE VISIT (OUTPATIENT)
Dept: NEPHROLOGY | Facility: MEDICAL CENTER | Age: 68
End: 2023-05-16
Payer: MEDICARE

## 2023-05-16 VITALS
DIASTOLIC BLOOD PRESSURE: 62 MMHG | TEMPERATURE: 97.9 F | WEIGHT: 123 LBS | OXYGEN SATURATION: 100 % | HEIGHT: 66 IN | BODY MASS INDEX: 19.77 KG/M2 | HEART RATE: 71 BPM | SYSTOLIC BLOOD PRESSURE: 120 MMHG

## 2023-05-16 DIAGNOSIS — N18.9 CHRONIC KIDNEY DISEASE, UNSPECIFIED CKD STAGE: ICD-10-CM

## 2023-05-16 DIAGNOSIS — Z71.6 TOBACCO ABUSE COUNSELING: ICD-10-CM

## 2023-05-16 DIAGNOSIS — Z72.0 TOBACCO ABUSE: ICD-10-CM

## 2023-05-16 DIAGNOSIS — F32.A DEPRESSION, UNSPECIFIED DEPRESSION TYPE: ICD-10-CM

## 2023-05-16 DIAGNOSIS — I10 ESSENTIAL HYPERTENSION: ICD-10-CM

## 2023-05-16 DIAGNOSIS — E87.1 HYPONATREMIA: ICD-10-CM

## 2023-05-16 DIAGNOSIS — E87.6 HYPOKALEMIA: ICD-10-CM

## 2023-05-16 PROCEDURE — 3074F SYST BP LT 130 MM HG: CPT | Performed by: INTERNAL MEDICINE

## 2023-05-16 PROCEDURE — 3078F DIAST BP <80 MM HG: CPT | Performed by: INTERNAL MEDICINE

## 2023-05-16 PROCEDURE — 99213 OFFICE O/P EST LOW 20 MIN: CPT | Performed by: INTERNAL MEDICINE

## 2023-05-16 RX ORDER — POTASSIUM CHLORIDE 750 MG/1
1 CAPSULE, EXTENDED RELEASE ORAL EVERY MORNING
Status: ON HOLD | COMMUNITY
Start: 2022-12-22 | End: 2024-03-26

## 2023-05-16 RX ORDER — FAMOTIDINE 20 MG/1
20 TABLET, FILM COATED ORAL 2 TIMES DAILY
Qty: 60 TABLET | Refills: 2 | Status: SHIPPED | OUTPATIENT
Start: 2023-05-16 | End: 2023-06-09

## 2023-05-16 ASSESSMENT — ENCOUNTER SYMPTOMS
VOMITING: 0
FEVER: 0
NAUSEA: 0
CHILLS: 0
DEPRESSION: 1
COUGH: 0
DIARRHEA: 1
HYPERTENSION: 1
SHORTNESS OF BREATH: 0

## 2023-05-16 ASSESSMENT — FIBROSIS 4 INDEX: FIB4 SCORE: 0.98

## 2023-05-16 NOTE — PROGRESS NOTES
"Subjective     Asha Chelly Manuel is a 68 y.o. female who presents with Hypertension and Chronic Kidney Disease            Patient has a history of liver disease, she is under the care of GI team and  [place] also locally, however she is not able to see her GI specialist until September.  Recently was seen in the emergency room with explosive diarrhea.  Patient clearly depressed.    Hypertension  This is a chronic problem. The current episode started more than 1 year ago. The problem is unchanged. The problem is controlled. Associated symptoms include malaise/fatigue. Pertinent negatives include no chest pain, peripheral edema or shortness of breath. Risk factors for coronary artery disease include post-menopausal state. Past treatments include beta blockers and diuretics. The current treatment provides significant improvement. There are no compliance problems.  Hypertensive end-organ damage includes kidney disease. Identifiable causes of hypertension include chronic renal disease.   Chronic Kidney Disease  This is a chronic problem. The current episode started more than 1 year ago. The problem occurs constantly. The problem has been waxing and waning. Pertinent negatives include no chest pain, chills, coughing, fever, nausea, urinary symptoms or vomiting.       Review of Systems   Constitutional:  Positive for malaise/fatigue. Negative for chills and fever.   Respiratory:  Negative for cough and shortness of breath.    Cardiovascular:  Negative for chest pain and leg swelling.   Gastrointestinal:  Positive for diarrhea. Negative for nausea and vomiting.   Genitourinary:  Negative for dysuria, frequency and urgency.   Psychiatric/Behavioral:  Positive for depression.               Objective     /62 (BP Location: Right arm, Patient Position: Sitting, BP Cuff Size: Adult)   Pulse 71   Temp 36.6 °C (97.9 °F) (Temporal)   Ht 1.676 m (5' 6\")   Wt 55.8 kg (123 lb)   LMP 04/29/2005 (LMP Unknown)   SpO2 100%  " " BMI 19.85 kg/m²      Physical Exam  Vitals and nursing note reviewed.   Constitutional:       General: She is not in acute distress.     Appearance: Normal appearance. She is well-developed. She is not diaphoretic.   HENT:      Head: Normocephalic and atraumatic.      Right Ear: External ear normal.      Left Ear: External ear normal.      Nose: Nose normal.   Eyes:      General: No scleral icterus.        Right eye: No discharge.         Left eye: No discharge.      Conjunctiva/sclera: Conjunctivae normal.   Cardiovascular:      Rate and Rhythm: Normal rate and regular rhythm.      Heart sounds: No murmur heard.  Pulmonary:      Effort: Pulmonary effort is normal. No respiratory distress.      Breath sounds: Normal breath sounds.   Musculoskeletal:         General: No tenderness.      Right lower leg: No edema.      Left lower leg: No edema.   Skin:     General: Skin is warm and dry.      Findings: No erythema.   Neurological:      General: No focal deficit present.      Mental Status: She is alert and oriented to person, place, and time.      Cranial Nerves: No cranial nerve deficit.   Psychiatric:         Mood and Affect: Mood normal.         Behavior: Behavior normal.       Past Medical History:   Diagnosis Date    Adverse effect of anesthesia     in 2008 \"throat closes up\"\"anxiety\" ?laryngospasm, kept in ICU. Pt states no problems currently 2018.     Anemia     Anesthesia     in 2008 \"throat closes up\"\"anxiety\" ?laryngospasm, kept in ICU. Pt states no problems currently 2018.     Arthritis     right shoulder, hands    Bowel habit changes More frequent    Cataract 2022    Cigarette smoker quit 2013    Dental disorder     missing teeth; Partial plate on top    depression 08/30/2016    denies depression, states has anxiety and panic attacks    Diabetes mellitus type 1 (HCC) 1989    insulin    Dialysis patient ([facility]) Short time due to a kidney injury from a infection    Encounter for long-term (current) use of " "insulin (HCC) 09/25/2013    Heart burn     High cholesterol     Hypertension     Hypothyroidism, postsurgical 1970    Indigestion     Infectious disease      had hepatitis C, tested neg.    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) 06/10/2015     Social History     Socioeconomic History    Marital status:      Spouse name: Not on file    Number of children: Not on file    Years of education: Not on file    Highest education level: Not on file   Occupational History    Not on file   Tobacco Use    Smoking status: Every Day     Packs/day: 0.50     Years: 30.00     Pack years: 15.00     Types: Cigarettes    Smokeless tobacco: Never   Vaping Use    Vaping Use: Never used   Substance and Sexual Activity    Alcohol use: Not Currently    Drug use: Not Currently     Types: Inhaled, Oral     Comment: Marijuana - Occasional; edibles    Sexual activity: Not Currently   Other Topics Concern    Not on file   Social History Narrative    Not on file     Social Determinants of Health     Financial Resource Strain: Low Risk  (11/1/2021)    Overall Financial Resource Strain (CARDIA)     Difficulty of Paying Living Expenses: Not very hard   Food Insecurity: No Food Insecurity (11/1/2021)    Hunger Vital Sign     Worried About Running Out of Food in the Last Year: Never true     Ran Out of Food in the Last Year: Never true   Transportation Needs: No Transportation Needs (11/1/2021)    PRAPARE - Transportation     Lack of Transportation (Medical): No     Lack of Transportation (Non-Medical): No   Physical Activity: Inactive (6/23/2020)    Exercise Vital Sign     Days of Exercise per Week: 0 days     Minutes of Exercise per Session: 0 min "   Stress: No Stress Concern Present (6/23/2020)    [employer] - Occupational Stress Questionnaire     Feeling of Stress : Not at all   Social Connections: Moderately Isolated (6/23/2020)    Social Connection and Isolation Panel [NHANES]     Frequency of Communication with Friends and Family: More than three times a week     Frequency of Social Gatherings with Friends and Family: More than three times a week     Attends [employer] Services: Never     Active Member of Clubs or Organizations: No     Attends Club or Organization Meetings: Never     Marital Status:    Intimate Partner Violence: Not At Risk (6/23/2020)    Humiliation, Afraid, Rape, and Kick questionnaire     Fear of Current or Ex-Partner: No     Emotionally Abused: No     Physically Abused: No     Sexually Abused: No   Housing Stability: Not on file     Family History   Problem Relation Age of Onset    Hypertension Mother     Cancer Father      Recent Labs     07/13/22  1517 08/29/22  0950 11/19/22  1028 01/30/23  1533 02/21/23  1550 03/03/23  1017 03/09/23  1147 04/29/23  0000 05/09/23  1316 05/09/23  1317 05/09/23  1326   ALBUMIN 3.7   < > 3.8  --    < > 3.7   < > 3.1* 3.35* 3.3 3.2   HDL  --   --  114  --   --  83  --   --   --   --   --    TRIGLYCERIDE  --   --  93  --   --  186*  --   --   --   --   --    SODIUM 140   < > 140  --    < > 137   < > 128* 133*  --  130*   POTASSIUM 3.1*   < > 4.4  --    < > 4.3   < > 4.3 3.3*  --  3.3*   CHLORIDE 101   < > 103  --    < > 103   < > 97 95*  --  95*   CO2 29   < > 23  --    < > 21   < > 19* 22  --  22   BUN 23*   < > 28*  --    < > 26*   < > 27* 18  --  19   CREATININE 1.16   < > 1.07  --    < > 1.31   < > 1.65* 1.26  --  1.17   PHOSPHORUS 4.3  --   --  3.8  --  4.1  --   --   --   --   --     < > = values in this interval not displayed.                             Assessment & Plan        1. Chronic kidney disease, unspecified CKD stage  Creatinine has improved  No  uremic symptoms  Renal dose of medication  Avoid nephrotoxins  Continue same medication regimen  Patient was advised to call us if symptoms worsen      2. Essential hypertension  Controlled  Continue same medication regimen  Continue low-sodium diet      3. Hypokalemia  Secondary to diarrhea  Continue potassium supplement  Recheck labs    4. Hyponatremia  Secondary to diarrhea  Recheck labs    5. Tobacco abuse    6. Tobacco abuse counseling  I spent 3 minutes discussing the need for smoking/tobacco cessation. We discussed measures for quitting including replacements such as nicotine gum/nicotine patch       7. Depression, unspecified depression type  Patient was advised to follow-up with a primary care provider

## 2023-05-17 LAB — CH50 SERPL-ACNC: >95 U/ML (ref 38.7–89.9)

## 2023-05-27 ENCOUNTER — OFFICE VISIT (OUTPATIENT)
Dept: URGENT CARE | Facility: CLINIC | Age: 68
End: 2023-05-27
Payer: MEDICARE

## 2023-05-27 VITALS
BODY MASS INDEX: 19.96 KG/M2 | DIASTOLIC BLOOD PRESSURE: 74 MMHG | OXYGEN SATURATION: 99 % | HEIGHT: 66 IN | SYSTOLIC BLOOD PRESSURE: 160 MMHG | RESPIRATION RATE: 14 BRPM | TEMPERATURE: 97.6 F | WEIGHT: 124.2 LBS | HEART RATE: 78 BPM

## 2023-05-27 DIAGNOSIS — B37.9 ANTIBIOTIC-INDUCED YEAST INFECTION: ICD-10-CM

## 2023-05-27 DIAGNOSIS — S40.811A ABRASION OF RIGHT UPPER EXTREMITY, INITIAL ENCOUNTER: ICD-10-CM

## 2023-05-27 DIAGNOSIS — T36.95XA ANTIBIOTIC-INDUCED YEAST INFECTION: ICD-10-CM

## 2023-05-27 PROCEDURE — 99213 OFFICE O/P EST LOW 20 MIN: CPT | Mod: 25

## 2023-05-27 PROCEDURE — 3077F SYST BP >= 140 MM HG: CPT

## 2023-05-27 PROCEDURE — 90715 TDAP VACCINE 7 YRS/> IM: CPT

## 2023-05-27 PROCEDURE — 3078F DIAST BP <80 MM HG: CPT

## 2023-05-27 PROCEDURE — 90471 IMMUNIZATION ADMIN: CPT

## 2023-05-27 RX ORDER — DOXYCYCLINE 100 MG/1
100 CAPSULE ORAL 2 TIMES DAILY
Qty: 10 CAPSULE | Refills: 0 | Status: SHIPPED | OUTPATIENT
Start: 2023-05-27 | End: 2023-06-01

## 2023-05-27 RX ORDER — AMOXICILLIN 500 MG/1
500 CAPSULE ORAL 3 TIMES DAILY
Qty: 15 CAPSULE | Refills: 0 | Status: SHIPPED | OUTPATIENT
Start: 2023-05-27 | End: 2023-06-01

## 2023-05-27 RX ORDER — FLUCONAZOLE 150 MG/1
150 TABLET ORAL DAILY
Qty: 1 TABLET | Refills: 0 | Status: SHIPPED | OUTPATIENT
Start: 2023-05-27 | End: 2023-08-28

## 2023-05-27 ASSESSMENT — ENCOUNTER SYMPTOMS
FEVER: 0
SHORTNESS OF BREATH: 0
MYALGIAS: 0

## 2023-05-27 ASSESSMENT — FIBROSIS 4 INDEX: FIB4 SCORE: 0.98

## 2023-05-27 NOTE — PROGRESS NOTES
Subjective:     CHIEF COMPLAINT  Chief Complaint   Patient presents with    Wound Check     Pt has  a wound on (R) arm, skin peeled x yesterday       HPI  Anu Manuel is a very pleasant 68 y.o. female who presents with an abrasion to her right dorsal forearm.  She reports that she was fixing a fence when she scraped her arm on something.  She is uncertain of what she scraped her arm on, but says that there was a lot of lorrei materials there that she may have rubbed against.  The skin completely peeled off leaving approximately a 1 cm area devoid of skin.  She denies fevers, chills, body aches.  She does not recall her last tetanus vaccine.  She has a history of chronic kidney disease and type 1 diabetes mellitus and is concerned for infection.    REVIEW OF SYSTEMS  Review of Systems   Constitutional:  Negative for fever and malaise/fatigue.   Respiratory:  Negative for shortness of breath.    Cardiovascular:  Negative for chest pain.   Musculoskeletal:  Negative for myalgias.       PAST MEDICAL HISTORY  Patient Active Problem List    Diagnosis Date Noted    Senile purpura (HCC) 03/24/2023    Long-term insulin use (HCC) 08/19/2022    Liver failure without hepatic coma (HCC) 01/14/2022    Constipation 11/26/2021    Elevated LFTs 11/24/2021    Fluid collection at surgical site 11/10/2021    Anasarca 10/29/2021    Cholecystitis 10/25/2021    Bilious vomiting with nausea 10/21/2021    Hepatitis 10/21/2021    Jaundice 10/20/2021    Current moderate episode of major depressive disorder (HCC) 10/17/2021    Generalized abdominal pain 10/08/2021    Epigastric pain 09/28/2021    Elevated liver enzymes 09/23/2021    Anemia 09/23/2021    Elevated troponin 09/23/2021    Pain in the chest 08/17/2020    CAD S/P percutaneous coronary angioplasty 08/11/2020    Hypothyroidism in adult 08/10/2020    Dyslipidemia 06/23/2020    GERD (gastroesophageal reflux disease) 06/23/2020    Lung nodule 06/23/2020    Hemoptysis  06/23/2020    Anxiety 01/25/2020    Nausea & vomiting 01/25/2020    Hypertension 01/24/2020    Hypoglycemia 01/22/2020    RHEA (acute kidney injury) ([facility]) 01/21/2020    Post-operative wound abscess 01/18/2020    Tobacco abuse 01/18/2020    Cellulitis 03/15/2018    Left hip pain 03/15/2018    Acute left lumbar radiculopathy 08/31/2016    Lumbar spinal stenosis 05/10/2016    Type 1 diabetes mellitus with neurological manifestations, uncontrolled 06/10/2015    Diabetic polyneuropathy ([facility]) 06/10/2015    Vertigo 04/08/2015    Type 1 diabetes mellitus on insulin therapy ([facility]) 09/25/2013    Advanced care planning/counseling discussion 09/25/2013    Accidental drug overdose 08/27/2012    Vitamin d deficiency 03/14/2012    Hypothyroidism, postsurgical     Cigarette smoker        SURGICAL HISTORY   has a past surgical history that includes hysterectomy, vaginal (2006); thyroid lobectomy (1973); lumpectomy (1976, 2005); cervical disk and fusion anterior (03/12/2008); tonsillectomy (1963); cervical fusion posterior (01/16/2009); hardware removal neuro (01/16/2009); neck exploration (01/16/2009); lumpectomy; lumbar laminectomy diskectomy (Right, 05/10/2016); shoulder decompression arthroscopic (06/17/2008); clavicle distal excision (06/17/2008); shoulder arthroscopy w/ rotator cuff repair (10/09/2008); njx aa&/strd tfrml epi lumbar/sacral 1 level (Right, 08/31/2016); spinal cord stimulator (N/A, 10/26/2018); thoracic laminectomy (N/A, 10/26/2018); appendectomy (2004); implant neurostim/ (N/A, 12/16/2019); irrigation & debridement neuro (01/19/2020); cath placement (01/25/2020); ercp,diagnostic (N/A, 10/26/2021); upper gi endoscopy,biopsy (N/A, 10/26/2021); upper gi endoscopy,diagnosis (N/A, 10/26/2021); peter by laparoscopy (N/A, 10/28/2021); ercp,diagnostic (N/A, 01/14/2022); other cardiac surgery (2020 stents inserted); and other abdominal surgery (2004).    ALLERGIES  Allergies   Allergen Reactions    Tape  Unspecified and Rash     Blisters, paper tape is ok  Other reaction(s): Rash       CURRENT MEDICATIONS  Home Medications       Reviewed by Constanza Boucher P.A.-C. (Physician Assistant) on 05/27/23 at 1124  Med List Status: <None>     Medication Last Dose Status   BD PEN NEEDLE SOFIA U/F Taking Active   clindamycin (CLEOCIN) 1 % Solution  Active   Continuous Blood Gluc  (DEXCOM G6 ) Device Not Taking Active   Continuous Blood Gluc Sensor (DEXCOM G6 SENSOR) Misc Not Taking Active   Continuous Blood Gluc Transmit (DEXCOM G6 TRANSMITTER) Misc Taking Active   cyanocobalamin (VITAMIN B12) 2500 MCG SL Tab Taking Active   ezetimibe (ZETIA) 10 MG Tab Taking Active   famotidine (PEPCID) 20 MG Tab Taking Active   hydroCHLOROthiazide (HYDRODIURIL) 25 MG Tab Taking Active   insulin aspart (NOVOLOG FLEXPEN) 100 UNIT/ML injection PEN Taking Active   Insulin Glargine, 1 Unit Dial, (TOUJEO SOLOSTAR) 300 UNIT/ML Solution Pen-injector Taking Active   metoprolol SR (TOPROL XL) 100 MG TABLET SR 24 HR Taking Active   metoprolol SR (TOPROL XL) 50 MG TABLET SR 24 HR Taking Active   ondansetron (ZOFRAN ODT) 8 MG TABLET DISPERSIBLE Not Taking Active   oxyCODONE-acetaminophen (PERCOCET) 7.5-325 MG per tablet Taking Active   potassium chloride (MICRO-K) 10 MEQ capsule Taking Active   potassium chloride (MICRO-K) 10 MEQ capsule Taking Active   pravastatin (PRAVACHOL) 40 MG tablet Taking Active   riFAMPin (RIFADINE) 300 MG Cap Taking Active   SYNTHROID 125 MCG Tab Taking Active   traZODone (DESYREL) 100 MG Tab Taking Active   ursodiol (ACTIGALL) 300 MG Cap Taking Active   Vitamin D, Ergocalciferol, 50 MCG (2000 UT) Cap Taking Active                    SOCIAL HISTORY  Social History     Tobacco Use    Smoking status: Every Day     Packs/day: 0.50     Years: 30.00     Pack years: 15.00     Types: Cigarettes    Smokeless tobacco: Never   Vaping Use    Vaping Use: Never used   Substance and Sexual Activity    Alcohol use: Not  "Currently    Drug use: Yes     Types: Inhaled, Oral, Marijuana     Comment: Marijuana - Occasional; edibles    Sexual activity: Not Currently       FAMILY HISTORY  Family History   Problem Relation Age of Onset    Hypertension Mother     Cancer Father           Objective:     VITAL SIGNS: BP (!) 160/74 (BP Location: Left arm, Patient Position: Sitting, BP Cuff Size: Adult)   Pulse 78   Temp 36.4 °C (97.6 °F) (Temporal)   Resp 14   Ht 1.676 m (5' 6\")   Wt 56.3 kg (124 lb 3.2 oz)   LMP 04/29/2005 (LMP Unknown)   SpO2 99%   BMI 20.05 kg/m²     PHYSICAL EXAM  Physical Exam  Vitals reviewed.   Constitutional:       General: She is not in acute distress.     Appearance: Normal appearance. She is normal weight. She is not ill-appearing, toxic-appearing or diaphoretic.   HENT:      Head: Normocephalic and atraumatic.      Mouth/Throat:      Mouth: Mucous membranes are moist.   Eyes:      Conjunctiva/sclera: Conjunctivae normal.   Pulmonary:      Effort: Pulmonary effort is normal.   Skin:     General: Skin is warm and dry.      Capillary Refill: Capillary refill takes less than 2 seconds.             Comments: Triangular abrasion with superficial layer of skin peeled away. No evidence of muscle, nerve, or vascular involvement. No evidence of infection at this time. No exudate, erythema, edema.    Neurological:      General: No focal deficit present.      Mental Status: She is alert.   Psychiatric:         Mood and Affect: Mood normal.         Assessment/Plan:     1. Abrasion of right upper extremity, initial encounter  - Tdap =>8yo IM  - amoxicillin (AMOXIL) 500 MG Cap; Take 1 Capsule by mouth 3 times a day for 5 days.  Dispense: 15 Capsule; Refill: 0  - doxycycline (MONODOX) 100 MG capsule; Take 1 Capsule by mouth 2 times a day for 5 days.  Dispense: 10 Capsule; Refill: 0  - mupirocin (BACTROBAN) 2 % Ointment; Apply 1 Application. topically every day for 5 days.  Dispense: 1 g; Refill: 0    2. Antibiotic-induced " yeast infection  - fluconazole (DIFLUCAN) 150 MG tablet; Take 1 Tablet by mouth every day.  Dispense: 1 Tablet; Refill: 0  -Return to clinic if symptoms worsen or fail to resolve  -Change dressing once daily and apply mupirocin ointment until wound resolves or entirely scabs over    MDM/Comments:  Patient has stable vital signs and is non-toxic appearing. Discussed supportive care with hydration, rest, Tylenol/Ibuprofen as needed.  Wound irrigated with saline and redressed using Polysporin ointment and nonstick gauze with Coban wrapping.  Tetanus vaccination given in office.  Home care supplies given for wound management.  Given patient's history of autoimmune diseases, treating with dual coverage antibiotics.  Creatinine clearance calculated at 42.  No medication alterations necessary for kidney or hepatic functioning.  Patient demonstrated understanding of treatment plan at this time and will RTC if symptoms worsen or fail to resolve.     Differential diagnosis, natural history, supportive care, and indications for immediate follow-up discussed. All questions answered. Patient agrees with the plan of care.    Follow-up as needed if symptoms worsen or fail to improve to PCP, Urgent care or Emergency Room.    I have personally reviewed prior external notes and test results pertinent to today's visit.  I have independently reviewed and interpreted all diagnostics ordered during this urgent care acute visit.   Discussed management options (risks,benefits, and alternatives to treatment). Pt expresses understanding and the treatment plan was agreed upon. Questions were encouraged and answered to pt's satisfaction.    Please note that this dictation was created using voice recognition software. I have made a reasonable attempt to correct obvious errors, but I expect that there are errors of grammar and possibly content that I did not discover before finalizing the note.

## 2023-06-09 ENCOUNTER — HOSPITAL ENCOUNTER (OUTPATIENT)
Dept: LAB | Facility: MEDICAL CENTER | Age: 68
End: 2023-06-09
Attending: INTERNAL MEDICINE
Payer: MEDICARE

## 2023-06-09 LAB — MISCELLANEOUS LAB RESULT MISCLAB: NORMAL

## 2023-06-09 PROCEDURE — 80053 COMPREHEN METABOLIC PANEL: CPT

## 2023-06-09 PROCEDURE — 36415 COLL VENOUS BLD VENIPUNCTURE: CPT

## 2023-06-09 PROCEDURE — 83690 ASSAY OF LIPASE: CPT

## 2023-06-09 RX ORDER — FAMOTIDINE 20 MG/1
TABLET, FILM COATED ORAL
Qty: 60 TABLET | Refills: 2 | Status: SHIPPED | OUTPATIENT
Start: 2023-06-09 | End: 2023-12-11

## 2023-06-10 LAB
ALBUMIN SERPL BCP-MCNC: 3.5 G/DL (ref 3.2–4.9)
ALBUMIN/GLOB SERPL: 1 G/DL
ALP SERPL-CCNC: 1039 U/L (ref 30–99)
ALT SERPL-CCNC: 51 U/L (ref 2–50)
ANION GAP SERPL CALC-SCNC: 12 MMOL/L (ref 7–16)
AST SERPL-CCNC: 60 U/L (ref 12–45)
BILIRUB SERPL-MCNC: 1.3 MG/DL (ref 0.1–1.5)
BUN SERPL-MCNC: 36 MG/DL (ref 8–22)
CALCIUM ALBUM COR SERPL-MCNC: 8.9 MG/DL (ref 8.5–10.5)
CALCIUM SERPL-MCNC: 8.5 MG/DL (ref 8.5–10.5)
CHLORIDE SERPL-SCNC: 105 MMOL/L (ref 96–112)
CO2 SERPL-SCNC: 19 MMOL/L (ref 20–33)
CREAT SERPL-MCNC: 1.8 MG/DL (ref 0.5–1.4)
GFR SERPLBLD CREATININE-BSD FMLA CKD-EPI: 31 ML/MIN/1.73 M 2
GLOBULIN SER CALC-MCNC: 3.4 G/DL (ref 1.9–3.5)
GLUCOSE SERPL-MCNC: 311 MG/DL (ref 65–99)
LIPASE SERPL-CCNC: 17 U/L (ref 11–82)
POTASSIUM SERPL-SCNC: 4.7 MMOL/L (ref 3.6–5.5)
PROT SERPL-MCNC: 6.9 G/DL (ref 6–8.2)
SODIUM SERPL-SCNC: 136 MMOL/L (ref 135–145)

## 2023-06-12 RX ORDER — PRAVASTATIN SODIUM 40 MG
TABLET ORAL
Qty: 100 TABLET | Refills: 0 | Status: SHIPPED | OUTPATIENT
Start: 2023-06-12 | End: 2023-09-21

## 2023-06-13 ENCOUNTER — HOSPITAL ENCOUNTER (OUTPATIENT)
Dept: CARDIOLOGY | Facility: MEDICAL CENTER | Age: 68
End: 2023-06-13
Attending: INTERNAL MEDICINE
Payer: MEDICARE

## 2023-06-13 DIAGNOSIS — Z01.810 PRE-OPERATIVE CARDIOVASCULAR EXAMINATION: Primary | ICD-10-CM

## 2023-06-13 LAB — EKG IMPRESSION: NORMAL

## 2023-06-13 PROCEDURE — 93010 ELECTROCARDIOGRAM REPORT: CPT | Performed by: INTERNAL MEDICINE

## 2023-06-13 PROCEDURE — 93005 ELECTROCARDIOGRAM TRACING: CPT

## 2023-06-15 NOTE — ASSESSMENT & PLAN NOTE
-Continue outpatient Synthroid   Xerosis Normal Treatment: I recommended application of Cetaphil or CeraVe numerous times a day and before going to bed to all dry areas.

## 2023-06-26 NOTE — PROGRESS NOTES
Pt is A&O x4, calls appropriately  Pain 8/10, medicated per MAR   - nausea  Tolerating a diabetic diet   x3 lap sites with CDI dermabond  RLQ ALBINO, high bloody sanginous output  + Voids  + flatus  Last BM 10/27  Up SBA  Bed alarm refused, pt moderate fall risk per vandana bueno  Reviewed plan of care with patient, bed in lowest position and locked, pt resting comfortably now, call light within reach, all needs met at this time. Interventions will be executed per plan of care   Mohs Rapid Report Verbiage: The area of clinically evident tumor was marked with skin marking ink and appropriately hatched.  The initial incision was made following the Mohs approach through the skin.  The specimen was taken to the lab, divided into the necessary number of pieces, chromacoded and processed according to the Mohs protocol.  This was repeated in successive stages until a tumor free defect was achieved.

## 2023-06-28 ENCOUNTER — HOSPITAL ENCOUNTER (OUTPATIENT)
Dept: RADIOLOGY | Facility: MEDICAL CENTER | Age: 68
End: 2023-06-28
Attending: INTERNAL MEDICINE
Payer: MEDICARE

## 2023-06-28 ENCOUNTER — ANESTHESIA (OUTPATIENT)
Dept: RADIOLOGY | Facility: MEDICAL CENTER | Age: 68
End: 2023-06-28
Payer: MEDICARE

## 2023-06-28 ENCOUNTER — ANESTHESIA EVENT (OUTPATIENT)
Dept: RADIOLOGY | Facility: MEDICAL CENTER | Age: 68
End: 2023-06-28
Payer: MEDICARE

## 2023-06-28 VITALS
TEMPERATURE: 98.9 F | BODY MASS INDEX: 19.44 KG/M2 | HEART RATE: 76 BPM | OXYGEN SATURATION: 97 % | RESPIRATION RATE: 18 BRPM | SYSTOLIC BLOOD PRESSURE: 122 MMHG | DIASTOLIC BLOOD PRESSURE: 60 MMHG | WEIGHT: 121 LBS | HEIGHT: 66 IN

## 2023-06-28 DIAGNOSIS — R97.8 ELEVATED CA 19-9 LEVEL: ICD-10-CM

## 2023-06-28 DIAGNOSIS — K83.1 CHOLESTASIS: ICD-10-CM

## 2023-06-28 DIAGNOSIS — R97.0 ELEVATED CEA: ICD-10-CM

## 2023-06-28 LAB — GLUCOSE BLD STRIP.AUTO-MCNC: 202 MG/DL (ref 65–99)

## 2023-06-28 PROCEDURE — 700105 HCHG RX REV CODE 258: Performed by: ANESTHESIOLOGY

## 2023-06-28 PROCEDURE — 01922 ANES N-INVAS IMG/RADJ THER: CPT | Performed by: ANESTHESIOLOGY

## 2023-06-28 PROCEDURE — A9579 GAD-BASE MR CONTRAST NOS,1ML: HCPCS | Performed by: INTERNAL MEDICINE

## 2023-06-28 PROCEDURE — 74183 MRI ABD W/O CNTR FLWD CNTR: CPT

## 2023-06-28 PROCEDURE — 700111 HCHG RX REV CODE 636 W/ 250 OVERRIDE (IP): Performed by: ANESTHESIOLOGY

## 2023-06-28 PROCEDURE — 700101 HCHG RX REV CODE 250: Performed by: ANESTHESIOLOGY

## 2023-06-28 PROCEDURE — 700117 HCHG RX CONTRAST REV CODE 255: Performed by: INTERNAL MEDICINE

## 2023-06-28 PROCEDURE — 82962 GLUCOSE BLOOD TEST: CPT

## 2023-06-28 RX ORDER — HALOPERIDOL 5 MG/ML
1 INJECTION INTRAMUSCULAR
Status: DISCONTINUED | OUTPATIENT
Start: 2023-06-28 | End: 2023-06-29 | Stop reason: HOSPADM

## 2023-06-28 RX ORDER — SODIUM CHLORIDE, SODIUM LACTATE, POTASSIUM CHLORIDE, CALCIUM CHLORIDE 600; 310; 30; 20 MG/100ML; MG/100ML; MG/100ML; MG/100ML
INJECTION, SOLUTION INTRAVENOUS CONTINUOUS
Status: DISCONTINUED | OUTPATIENT
Start: 2023-06-28 | End: 2023-06-29 | Stop reason: HOSPADM

## 2023-06-28 RX ORDER — ONDANSETRON 2 MG/ML
4 INJECTION INTRAMUSCULAR; INTRAVENOUS
Status: DISCONTINUED | OUTPATIENT
Start: 2023-06-28 | End: 2023-06-29 | Stop reason: HOSPADM

## 2023-06-28 RX ORDER — ONDANSETRON 2 MG/ML
INJECTION INTRAMUSCULAR; INTRAVENOUS PRN
Status: DISCONTINUED | OUTPATIENT
Start: 2023-06-28 | End: 2023-06-28 | Stop reason: SURG

## 2023-06-28 RX ORDER — LIDOCAINE HYDROCHLORIDE 20 MG/ML
INJECTION, SOLUTION EPIDURAL; INFILTRATION; INTRACAUDAL; PERINEURAL PRN
Status: DISCONTINUED | OUTPATIENT
Start: 2023-06-28 | End: 2023-06-28 | Stop reason: SURG

## 2023-06-28 RX ORDER — HYDRALAZINE HYDROCHLORIDE 20 MG/ML
5 INJECTION INTRAMUSCULAR; INTRAVENOUS
Status: DISCONTINUED | OUTPATIENT
Start: 2023-06-28 | End: 2023-06-29 | Stop reason: HOSPADM

## 2023-06-28 RX ORDER — METOPROLOL TARTRATE 1 MG/ML
1 INJECTION, SOLUTION INTRAVENOUS
Status: DISCONTINUED | OUTPATIENT
Start: 2023-06-28 | End: 2023-06-29 | Stop reason: HOSPADM

## 2023-06-28 RX ORDER — LABETALOL HYDROCHLORIDE 5 MG/ML
5 INJECTION, SOLUTION INTRAVENOUS
Status: DISCONTINUED | OUTPATIENT
Start: 2023-06-28 | End: 2023-06-29 | Stop reason: HOSPADM

## 2023-06-28 RX ORDER — MEPERIDINE HYDROCHLORIDE 25 MG/ML
6.25 INJECTION INTRAMUSCULAR; INTRAVENOUS; SUBCUTANEOUS
Status: DISCONTINUED | OUTPATIENT
Start: 2023-06-28 | End: 2023-06-29 | Stop reason: HOSPADM

## 2023-06-28 RX ADMIN — PROPOFOL 100 MG: 10 INJECTION, EMULSION INTRAVENOUS at 08:48

## 2023-06-28 RX ADMIN — SUGAMMADEX 200 MG: 100 INJECTION, SOLUTION INTRAVENOUS at 09:33

## 2023-06-28 RX ADMIN — GADOTERIDOL 10 ML: 279.3 INJECTION, SOLUTION INTRAVENOUS at 10:33

## 2023-06-28 RX ADMIN — LIDOCAINE HYDROCHLORIDE 30 MG: 20 INJECTION, SOLUTION EPIDURAL; INFILTRATION; INTRACAUDAL at 08:48

## 2023-06-28 RX ADMIN — ONDANSETRON 4 MG: 2 INJECTION INTRAMUSCULAR; INTRAVENOUS at 08:44

## 2023-06-28 RX ADMIN — ROCURONIUM BROMIDE 20 MG: 10 INJECTION, SOLUTION INTRAVENOUS at 09:16

## 2023-06-28 RX ADMIN — SODIUM CHLORIDE, POTASSIUM CHLORIDE, SODIUM LACTATE AND CALCIUM CHLORIDE: 600; 310; 30; 20 INJECTION, SOLUTION INTRAVENOUS at 08:43

## 2023-06-28 ASSESSMENT — FIBROSIS 4 INDEX: FIB4 SCORE: 1.22

## 2023-06-28 NOTE — ANESTHESIA POSTPROCEDURE EVALUATION
Patient: Anu Manuel    Procedure Summary     Date: 06/28/23 Room / Location: [facility] [address]    Anesthesia Start:  Anesthesia Stop:     Procedure: MR-ABDOMEN-WITH & W/O Diagnosis:       Cholestasis      Elevated CA 19-9 level      Elevated CEA    Scheduled Providers: Jaimee Quinones M.D. Responsible Provider:     Anesthesia Type: general ASA Status: 3          Final Anesthesia Type: general  Last vitals  BP   Blood Pressure : 130/61    Temp   37.2 °C (98.9 °F)    Pulse   76   Resp   18    SpO2   97 %      Anesthesia Post Evaluation    Patient location during evaluation: PACU  Patient participation: complete - patient participated  Level of consciousness: awake and alert    Airway patency: patent  Anesthetic complications: no  Cardiovascular status: hemodynamically stable  Respiratory status: acceptable  Hydration status: euvolemic    PONV: none          No notable events documented.     Nurse Pain Score: 4 (NPRS)

## 2023-06-28 NOTE — PROGRESS NOTES
Patient to MRI Outpatient Dept.  Patient informed process and plan of care during this visit.  Anesthesiologist, Dr Quinones spoke with patient and discussed provider's plan of care.  MRI completed.  Patient tolerated clear liquids once awake and appropriate.  DC instructions discussed, all questions answered.  Patient discharged in stable condition with responsible adult and discharged to [facility].

## 2023-06-28 NOTE — ANESTHESIA PROCEDURE NOTES
Airway    Date/Time: 6/28/2023 8:49 AM    Performed by: Jaimee Quinones M.D.  Authorized by: Jaimee Quinones M.D.    Location:  OR  Urgency:  Elective  Difficult Airway: No    Indications for Airway Management:  Anesthesia      Spontaneous Ventilation: absent    Sedation Level:  Deep  Preoxygenated: Yes    Mask Difficulty Assessment:  1 - vent by mask  Final Airway Type:  Supraglottic airway  Final Supraglottic Airway:  Standard LMA    SGA Size:  4  Number of Attempts at Approach:  1

## 2023-06-28 NOTE — ANESTHESIA PREPROCEDURE EVALUATION
Date/Time: 06/28/23 0900    Scheduled providers: Jaimee Quinones M.D.    Procedure: MR-ABDOMEN-WITH & W/O    Diagnosis:       Cholestasis [K83.1]      Elevated CA 19-9 level [R97.8]      Elevated CEA [R97.0]    Location: [facility] - MRI - [address]        NPO  Relevant Problems   ANESTHESIA   (negative) History of anesthesia complications   (negative) PONV (postoperative nausea and vomiting)      CARDIAC   (positive) CAD S/P percutaneous coronary angioplasty   (positive) Hypertension      GI   (positive) GERD (gastroesophageal reflux disease)         (positive) RHEA (acute kidney injury) (HCC)   (positive) Hepatitis   (positive) Liver failure without hepatic coma (HCC)      ENDO   (positive) Hypothyroidism in adult   (positive) Hypothyroidism, postsurgical   (positive) Type 1 diabetes mellitus on insulin therapy (HCC)   (positive) Type 1 diabetes mellitus with neurological manifestations, uncontrolled      Other   (positive) Cigarette smoker   (positive) Long-term insulin use (HCC)   (positive) Lung nodule       Physical Exam    Airway   Mallampati: II  TM distance: >3 FB  Neck ROM: full       Cardiovascular - normal exam  Rhythm: regular  Rate: normal  (-) murmur     Dental - normal exam           Pulmonary - normal exam  Breath sounds clear to auscultation     Abdominal    Neurological - normal exam                 Anesthesia Plan    ASA 3   ASA physical status 3 criteria: CAD/stents (> 3 months)    Plan - general       Airway plan will be LMA        Plan Factors:   Patient was previously instructed to abstain from smoking on day of procedure.  Patient smoked on day of procedure.      Induction: intravenous    Postoperative Plan: Postoperative administration of opioids is intended.    Pertinent diagnostic labs and testing reviewed    Informed Consent:    Anesthetic plan and risks discussed with patient.

## 2023-06-28 NOTE — ANESTHESIA TIME REPORT
Anesthesia Start and Stop Event Times     Date Time Event    6/28/2023 0835 Ready for Procedure     0843 Anesthesia Start     0936 Anesthesia Stop        Responsible Staff  06/28/23    Name Role Begin End    Jaimee Quinones M.D. Anesth 0843 0936        Overtime Reason:  no overtime (within assigned shift)    Comments:

## 2023-06-28 NOTE — DISCHARGE INSTRUCTIONS
MRI DISCHARGE  ADULT DISCHARGE INSTRUCTIONS    __________________________________________________________________________        YOU HAVE BEEN MEDICATED TODAY FOR YOUR SCN. PLEASE FOLLOW THE INSTRUCTIONS BELOW TO ENSURE YOUR SAFE RECOVERY. IF YOU HAVE ANY QUESTIONS OR PROBLEMS FEEL FREE TO CALL US -[phone].    1) HAVE SOMEONE STAY WITH YOU TO ASSIST YOU AS NEEDED.    2) DO NOT DRIVE OR OPERATE ANY MECHANICAL DEVICES.    3) DO NOT PERFORM ANY ACTIVITY THAT REQUIRES CONCENTRATION, MAKE NO MAJOR DECISIONS OVER THE NEXT 24 HOURS.    4) BE CAREFUL CHANGING POSITIONS FROM LAYING DOWN TO SITTING OR STANDING AS YOU MAY BECOME DIZZY.     5) DO NOT DRINK ALCOHOL FOR 48 HOURS.     6) THERE ARE NO RESTRICTIONS FOR EATING YOUR NORMAL MEALS. DRINK FLUIDS.    7) YOU MAY CONTINUE YOUR USUAL MEDICATIONS FOR PAIN, TRANQUILIZERS,  MUSCLE RELAXANTS OR SEDATIVES AFTER 12 HOURS.     8) TOMORROW, YOU MAY CONTINUE YOUR NORMAL DAILY ACTIVITIES.     9) LEAVE PRESSURE DRESSING ON 10 - 15 MINUTES. IF SWELLING OR BLEEDING OCCURS WHEN REMOVED, CONTINUE PLACING DIRECT PRESSURE ON SITE FOR  ANOTHER 5 MINUTES OR UNTIL BLEEDING STOPS.     I HAVE BEEN INFORMED OF AND UNDERSTAND THE ABOVE DISCHARGE INSTRUCTIONS.       __________________________________                               PATIENT/FAMILY MEMBER SIGNATURE      __________________________________                                                RELATIONSHIP TO PATIENT    6/28/2023                        9:14 AM      __________________________________  NURSE OR WITNESS SIGNATURE

## 2023-06-29 ENCOUNTER — HOSPITAL ENCOUNTER (EMERGENCY)
Facility: MEDICAL CENTER | Age: 68
End: 2023-06-29
Attending: EMERGENCY MEDICINE
Payer: MEDICARE

## 2023-06-29 VITALS
WEIGHT: 121.25 LBS | TEMPERATURE: 99.7 F | RESPIRATION RATE: 16 BRPM | SYSTOLIC BLOOD PRESSURE: 132 MMHG | HEART RATE: 72 BPM | HEIGHT: 66 IN | OXYGEN SATURATION: 95 % | BODY MASS INDEX: 19.49 KG/M2 | DIASTOLIC BLOOD PRESSURE: 61 MMHG

## 2023-06-29 DIAGNOSIS — R10.9 ABDOMINAL PAIN, UNSPECIFIED ABDOMINAL LOCATION: ICD-10-CM

## 2023-06-29 DIAGNOSIS — R05.1 ACUTE COUGH: ICD-10-CM

## 2023-06-29 DIAGNOSIS — R11.2 NAUSEA AND VOMITING, UNSPECIFIED VOMITING TYPE: Primary | ICD-10-CM

## 2023-06-29 LAB
ALBUMIN SERPL BCP-MCNC: 3 G/DL (ref 3.2–4.9)
ALBUMIN/GLOB SERPL: 0.7 G/DL
ALP SERPL-CCNC: 1231 U/L (ref 30–99)
ALT SERPL-CCNC: 62 U/L (ref 2–50)
ANION GAP SERPL CALC-SCNC: 15 MMOL/L (ref 7–16)
APPEARANCE UR: CLEAR
AST SERPL-CCNC: 60 U/L (ref 12–45)
BACTERIA #/AREA URNS HPF: NEGATIVE /HPF
BASOPHILS # BLD AUTO: 0.6 % (ref 0–1.8)
BASOPHILS # BLD: 0.05 K/UL (ref 0–0.12)
BILIRUB SERPL-MCNC: 1.6 MG/DL (ref 0.1–1.5)
BILIRUB UR QL STRIP.AUTO: ABNORMAL
BUN SERPL-MCNC: 25 MG/DL (ref 8–22)
CALCIUM ALBUM COR SERPL-MCNC: 9.4 MG/DL (ref 8.5–10.5)
CALCIUM SERPL-MCNC: 8.6 MG/DL (ref 8.5–10.5)
CHLORIDE SERPL-SCNC: 95 MMOL/L (ref 96–112)
CO2 SERPL-SCNC: 21 MMOL/L (ref 20–33)
COLOR UR: ABNORMAL
CREAT SERPL-MCNC: 1.59 MG/DL (ref 0.5–1.4)
EOSINOPHIL # BLD AUTO: 0.03 K/UL (ref 0–0.51)
EOSINOPHIL NFR BLD: 0.3 % (ref 0–6.9)
EPI CELLS #/AREA URNS HPF: NEGATIVE /HPF
ERYTHROCYTE [DISTWIDTH] IN BLOOD BY AUTOMATED COUNT: 48.6 FL (ref 35.9–50)
GFR SERPLBLD CREATININE-BSD FMLA CKD-EPI: 36 ML/MIN/1.73 M 2
GLOBULIN SER CALC-MCNC: 4.4 G/DL (ref 1.9–3.5)
GLUCOSE SERPL-MCNC: 359 MG/DL (ref 65–99)
GLUCOSE UR STRIP.AUTO-MCNC: >=1000 MG/DL
HCT VFR BLD AUTO: 33.3 % (ref 37–47)
HGB BLD-MCNC: 10.8 G/DL (ref 12–16)
HYALINE CASTS #/AREA URNS LPF: ABNORMAL /LPF
IMM GRANULOCYTES # BLD AUTO: 0.06 K/UL (ref 0–0.11)
IMM GRANULOCYTES NFR BLD AUTO: 0.7 % (ref 0–0.9)
KETONES UR STRIP.AUTO-MCNC: NEGATIVE MG/DL
LEUKOCYTE ESTERASE UR QL STRIP.AUTO: NEGATIVE
LIPASE SERPL-CCNC: 8 U/L (ref 11–82)
LYMPHOCYTES # BLD AUTO: 0.57 K/UL (ref 1–4.8)
LYMPHOCYTES NFR BLD: 6.3 % (ref 22–41)
MCH RBC QN AUTO: 30.8 PG (ref 27–33)
MCHC RBC AUTO-ENTMCNC: 32.4 G/DL (ref 32.2–35.5)
MCV RBC AUTO: 94.9 FL (ref 81.4–97.8)
MICRO URNS: ABNORMAL
MONOCYTES # BLD AUTO: 0.74 K/UL (ref 0–0.85)
MONOCYTES NFR BLD AUTO: 8.1 % (ref 0–13.4)
NEUTROPHILS # BLD AUTO: 7.63 K/UL (ref 1.82–7.42)
NEUTROPHILS NFR BLD: 84 % (ref 44–72)
NITRITE UR QL STRIP.AUTO: POSITIVE
NRBC # BLD AUTO: 0 K/UL
NRBC BLD-RTO: 0 /100 WBC (ref 0–0.2)
PH UR STRIP.AUTO: 5.5 [PH] (ref 5–8)
PLATELET # BLD AUTO: 346 K/UL (ref 164–446)
PMV BLD AUTO: 11 FL (ref 9–12.9)
POTASSIUM SERPL-SCNC: 3.3 MMOL/L (ref 3.6–5.5)
PROT SERPL-MCNC: 7.4 G/DL (ref 6–8.2)
PROT UR QL STRIP: 300 MG/DL
RBC # BLD AUTO: 3.51 M/UL (ref 4.2–5.4)
RBC # URNS HPF: ABNORMAL /HPF
RBC UR QL AUTO: ABNORMAL
SODIUM SERPL-SCNC: 131 MMOL/L (ref 135–145)
SP GR UR STRIP.AUTO: 1.02
UROBILINOGEN UR STRIP.AUTO-MCNC: 1 MG/DL
WBC # BLD AUTO: 9.1 K/UL (ref 4.8–10.8)
WBC #/AREA URNS HPF: ABNORMAL /HPF

## 2023-06-29 PROCEDURE — 700105 HCHG RX REV CODE 258: Performed by: EMERGENCY MEDICINE

## 2023-06-29 PROCEDURE — 85025 COMPLETE CBC W/AUTO DIFF WBC: CPT

## 2023-06-29 PROCEDURE — 80053 COMPREHEN METABOLIC PANEL: CPT

## 2023-06-29 PROCEDURE — 700111 HCHG RX REV CODE 636 W/ 250 OVERRIDE (IP): Mod: JZ | Performed by: EMERGENCY MEDICINE

## 2023-06-29 PROCEDURE — 99285 EMERGENCY DEPT VISIT HI MDM: CPT

## 2023-06-29 PROCEDURE — 81001 URINALYSIS AUTO W/SCOPE: CPT

## 2023-06-29 PROCEDURE — 96374 THER/PROPH/DIAG INJ IV PUSH: CPT

## 2023-06-29 PROCEDURE — 36415 COLL VENOUS BLD VENIPUNCTURE: CPT

## 2023-06-29 PROCEDURE — 83690 ASSAY OF LIPASE: CPT

## 2023-06-29 RX ORDER — ONDANSETRON 2 MG/ML
4 INJECTION INTRAMUSCULAR; INTRAVENOUS ONCE
Status: COMPLETED | OUTPATIENT
Start: 2023-06-29 | End: 2023-06-29

## 2023-06-29 RX ORDER — SODIUM CHLORIDE 9 MG/ML
1000 INJECTION, SOLUTION INTRAVENOUS ONCE
Status: COMPLETED | OUTPATIENT
Start: 2023-06-29 | End: 2023-06-29

## 2023-06-29 RX ADMIN — ONDANSETRON 4 MG: 2 INJECTION INTRAMUSCULAR; INTRAVENOUS at 19:54

## 2023-06-29 RX ADMIN — SODIUM CHLORIDE 1000 ML: 9 INJECTION, SOLUTION INTRAVENOUS at 19:50

## 2023-06-29 ASSESSMENT — LIFESTYLE VARIABLES
HAVE PEOPLE ANNOYED YOU BY CRITICIZING YOUR DRINKING: NO
HOW MANY TIMES IN THE PAST YEAR HAVE YOU HAD 5 OR MORE DRINKS IN A DAY: 0
TOTAL SCORE: 0
HAVE YOU EVER FELT YOU SHOULD CUT DOWN ON YOUR DRINKING: NO
TOTAL SCORE: 0
AVERAGE NUMBER OF DAYS PER WEEK YOU HAVE A DRINK CONTAINING ALCOHOL: 0
DO YOU DRINK ALCOHOL: NO
CONSUMPTION TOTAL: NEGATIVE
EVER HAD A DRINK FIRST THING IN THE MORNING TO STEADY YOUR NERVES TO GET RID OF A HANGOVER: NO
TOTAL SCORE: 0
EVER FELT BAD OR GUILTY ABOUT YOUR DRINKING: NO
ON A TYPICAL DAY WHEN YOU DRINK ALCOHOL HOW MANY DRINKS DO YOU HAVE: 0

## 2023-06-29 ASSESSMENT — FIBROSIS 4 INDEX: FIB4 SCORE: 1.22

## 2023-06-30 ENCOUNTER — TELEPHONE (OUTPATIENT)
Dept: SOCIAL WORK | Facility: CLINIC | Age: 68
End: 2023-06-30
Payer: MEDICARE

## 2023-06-30 NOTE — ED NOTES
Pt resting with even chest rise and fall, Pt reconnected to monitor, reports no needs at this time, call light available and in reach.

## 2023-06-30 NOTE — ED PROVIDER NOTES
ED Provider Note    CHIEF COMPLAINT  Chief Complaint   Patient presents with    Abdominal Pain    N/V    Malaise       EXTERNAL RECORDS REVIEWED  Other 5/16/2023 outpatient visit with nephrology, Dr. Najjar for chronic kidney disease.  4/28 through 4/29/2023 evaluation at [facility] ER for rectal bleeding and diarrhea.  History of CKD, liver dysfunction, insulin dependent diabetes    HPI/ROS  LIMITATION TO HISTORY   Select: : None    Anu Manuel is a 68 y.o. female who presents with cough, nausea, and acute exacerbation of chronic abdominal pain and vomiting since coming back from.  Would like a week ago.  Patient states she does have a history of kidney disease, liver disease, and recently had an MRI for chronic abdominal pain yesterday.  She states that she does not know the results of this image.  She states about a week ago she came back from [facility] and had increased nausea, episodes of vomiting, and a cough.  She is taken 3 COVID test at home which have all been negative.  She states that symptoms have not improved and she just feels very fatigued.  She states her nausea has been worse over the past several days, she is supposed to  a antiemetic prescribed to her by palliative care, however, is not on this yet.  She is here for further evaluation.  She denies blood in her vomit, blood in her stool, any change in her urine output.  She denies any fevers or chills.    PAST MEDICAL HISTORY   has a past medical history of Adverse effect of anesthesia, Anemia, Anesthesia, Arthritis, Bowel habit changes (More frequent), Cataract (2022), Cigarette smoker (quit 2013), Dental disorder, depression (08/30/2016), Diabetes mellitus type 1 (HCC) (1989), Dialysis patient (HCC) (Short time due to a kidney injury from a infection), Encounter for long-term (current) use of insulin ([facility]) (09/25/2013), Heart burn, High cholesterol, Hypertension, Hypothyroidism, postsurgical (1970), Indigestion,  Infectious disease, Jaundice (2021 Liver disease), Joint replacement, Liver disease, Liver failure (HCC), Pain, Polyneuropathy in diabetes(357.2) (06/10/2015), Status post appendectomy, and 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    SURGICAL HISTORY   has a past surgical history that includes hysterectomy, vaginal (2006); thyroid lobectomy (1973); lumpectomy (1976, 2005); cervical disk and fusion anterior (03/12/2008); tonsillectomy (1963); cervical fusion posterior (01/16/2009); hardware removal neuro (01/16/2009); neck exploration (01/16/2009); lumpectomy; lumbar laminectomy diskectomy (Right, 05/10/2016); shoulder decompression arthroscopic (06/17/2008); clavicle distal excision (06/17/2008); shoulder arthroscopy w/ rotator cuff repair (10/09/2008); njx aa&/strd tfrml epi lumbar/sacral 1 level (Right, 08/31/2016); spinal cord stimulator (N/A, 10/26/2018); thoracic laminectomy (N/A, 10/26/2018); appendectomy (2004); implant neurostim/ (N/A, 12/16/2019); irrigation & debridement neuro (01/19/2020); cath placement (01/25/2020); ercp,diagnostic (N/A, 10/26/2021); upper gi endoscopy,biopsy (N/A, 10/26/2021); upper gi endoscopy,diagnosis (N/A, 10/26/2021); peter by laparoscopy (N/A, 10/28/2021); ercp,diagnostic (N/A, 01/14/2022); other cardiac surgery (2020 stents inserted); and other abdominal surgery (2004).    FAMILY HISTORY  Family History   Problem Relation Age of Onset    Hypertension Mother     Cancer Father        SOCIAL HISTORY  Social History     Tobacco Use    Smoking status: Every Day     Packs/day: 0.50     Years: 30.00     Pack years: 15.00     Types: Cigarettes    Smokeless tobacco: Never   Vaping Use    Vaping Use: Never used   Substance and Sexual Activity    Alcohol use: Not Currently    Drug use: Yes     Types: Inhaled, Oral, Marijuana     Comment: Marijuana - Occasional; edibles    Sexual activity: Not Currently  "      CURRENT MEDICATIONS  Home Medications       Reviewed by Reyes Rodas R.N. (Registered Nurse) on 06/29/23 at 1824  Med List Status: Partial     Medication Last Dose Status   BD PEN NEEDLE SOFIA U/F  Active   clindamycin (CLEOCIN) 1 % Solution  Active   Continuous Blood Gluc Transmit (DEXCOM G6 TRANSMITTER) Misc  Active   cyanocobalamin (VITAMIN B12) 2500 MCG SL Tab  Active   ezetimibe (ZETIA) 10 MG Tab  Active   famotidine (PEPCID) 20 MG Tab  Active   fluconazole (DIFLUCAN) 150 MG tablet  Active   hydroCHLOROthiazide (HYDRODIURIL) 25 MG Tab  Active   insulin aspart (NOVOLOG FLEXPEN) 100 UNIT/ML injection PEN  Active   Insulin Glargine, 1 Unit Dial, (TOUJEO SOLOSTAR) 300 UNIT/ML Solution Pen-injector  Active   metoprolol SR (TOPROL XL) 100 MG TABLET SR 24 HR  Active   metoprolol SR (TOPROL XL) 50 MG TABLET SR 24 HR  Active   oxyCODONE-acetaminophen (PERCOCET) 7.5-325 MG per tablet  Active   potassium chloride (MICRO-K) 10 MEQ capsule  Active   potassium chloride (MICRO-K) 10 MEQ capsule  Active   pravastatin (PRAVACHOL) 40 MG tablet  Active   riFAMPin (RIFADINE) 300 MG Cap  Active   SYNTHROID 125 MCG Tab  Active   traZODone (DESYREL) 100 MG Tab  Active   ursodiol (ACTIGALL) 300 MG Cap  Active   Vitamin D, Ergocalciferol, 50 MCG (2000 UT) Cap  Active                    ALLERGIES  Allergies   Allergen Reactions    Tape Unspecified and Rash     Blisters, paper tape is ok  Other reaction(s): Rash       PHYSICAL EXAM  VITAL SIGNS: /61   Pulse 72   Temp 37.6 °C (99.7 °F)   Resp 16   Ht 1.676 m (5' 6\")   Wt 55 kg (121 lb 4.1 oz)   LMP 04/29/2005 (LMP Unknown)   SpO2 95%   BMI 19.57 kg/m²    Physical Exam  Vitals and nursing note reviewed.   Constitutional:       General: She is not in acute distress.     Appearance: She is well-developed and normal weight.      Comments: Occasional dry cough   HENT:      Head: Normocephalic and atraumatic.   Eyes:      Extraocular Movements: Extraocular movements " intact.      Pupils: Pupils are equal, round, and reactive to light.   Cardiovascular:      Rate and Rhythm: Normal rate and regular rhythm.      Heart sounds: No murmur heard.  Pulmonary:      Effort: Pulmonary effort is normal. No respiratory distress.      Breath sounds: Normal breath sounds. No stridor. No wheezing, rhonchi or rales.   Abdominal:      General: Abdomen is flat.      Palpations: Abdomen is soft. There is no shifting dullness.      Tenderness: There is generalized abdominal tenderness. There is no guarding or rebound. Negative signs include Cifuentes's sign, Rovsing's sign and McBurney's sign.   Skin:     General: Skin is warm and dry.      Findings: No rash.   Neurological:      General: No focal deficit present.      Mental Status: She is alert and oriented to person, place, and time.           DIAGNOSTIC STUDIES / PROCEDURES    LABS  Labs Reviewed   CBC WITH DIFFERENTIAL - Abnormal; Notable for the following components:       Result Value    RBC 3.51 (*)     Hemoglobin 10.8 (*)     Hematocrit 33.3 (*)     Neutrophils-Polys 84.00 (*)     Lymphocytes 6.30 (*)     Neutrophils (Absolute) 7.63 (*)     Lymphs (Absolute) 0.57 (*)     All other components within normal limits   COMP METABOLIC PANEL - Abnormal; Notable for the following components:    Sodium 131 (*)     Potassium 3.3 (*)     Chloride 95 (*)     Glucose 359 (*)     Bun 25 (*)     Creatinine 1.59 (*)     AST(SGOT) 60 (*)     ALT(SGPT) 62 (*)     Alkaline Phosphatase 1231 (*)     Total Bilirubin 1.6 (*)     Albumin 3.0 (*)     Globulin 4.4 (*)     All other components within normal limits   LIPASE - Abnormal; Notable for the following components:    Lipase 8 (*)     All other components within normal limits   URINALYSIS - Abnormal; Notable for the following components:    Glucose >=1000 (*)     Protein 300 (*)     Bilirubin Moderate (*)     Nitrite Positive (*)     Occult Blood Trace (*)     All other components within normal limits    ESTIMATED GFR - Abnormal; Notable for the following components:    GFR (CKD-EPI) 36 (*)     All other components within normal limits   URINE MICROSCOPIC (W/UA) - Abnormal; Notable for the following components:    RBC 10-20 (*)     All other components within normal limits   CORRECTED CALCIUM         RADIOLOGY          COURSE & MEDICAL DECISION MAKING    ED Observation Status? No; Patient does not meet criteria for ED Observation.     INITIAL ASSESSMENT, COURSE AND PLAN  Care Narrative: Anu Manuel is a 68 y.o. female who presents with cough, nausea, and acute exacerbation of chronic abdominal pain and vomiting since coming back from.  Patient is afebrile, vital signs reassuring.  She does have generalized abdominal tenderness without rebound or guarding.  Occasional dry cough clear lungs.  I do not feel that x-ray imaging is necessary as patient has normal oxygenation and clear lungs.  She has taken 3 COVID test at home that that were all negative.  Basic blood work is ordered, patient is given Zofran and IV fluids for her nausea vomiting.  She just had an MRI performed yesterday with no acute findings or malignancies.  No ascites.  Patient was informed of the MRI results.  HYDRATION: Based on the patient's presentation of Acute Vomiting the patient was given IV fluids. IV Hydration was used because oral hydration was not adequate alone. Upon recheck following hydration, the patient was stable.        DISPOSITION AND DISCUSSIONS  Patient has a chronic and stable anemia.  Slightly low sodium and potassium, she is given IV fluids and had slightly improved nausea with Zofran.  No petechia likely secondary to decreased oral intake.  Stable CKD, liver enzymes  and alk phosare elevated but also stable compared to previous labs . Urinalysis with 0-2 white blood cells, negative bacteria, negative epithelial cells but positive nitrite.  Unsure the etiology and will hold antibiotics until culture results.   Patient is reevaluated, informed of all results.  I do not feel that further imaging or labs is necessary as she recently had an MRI that was reassuring yesterday and labs here today are unchanged from her chronic abnormalities.  Possible viral illness with a cough and worsening nausea vomiting.  She states that she does have Zofran prescription prescribed to her by palliative care she has not picked up yet.  She will follow-up with her primary care provider and Dr. Zavala for her chronic abdominal pain.  She is given strict return precautions.  She is comfortable with mild opiate follow-up and discharged in good condition.    FINAL DIAGNOSIS  1. Nausea and vomiting, unspecified vomiting type Acute   2. Acute cough Acute   3. Abdominal pain, unspecified abdominal location Chronic       DISPOSITION:  Patient will be discharged home in stable condition.    FOLLOW UP:  Jarrell Yates M.D.  [address] [facility]  [address] [place] [id number]  [phone]      Schedule follow-up to be seen in the next 1 to 2 weeks for your symptoms if not improved    [facility] Emergency Dept  [address] [facility] [id number] [phone]    As needed, If symptoms worsen      OUTPATIENT MEDICATIONS:  Discharge Medication List as of 6/29/2023 10:56 PM            Electronically signed by: Bridgette Connelly M.D., 6/29/2023 7:08 PM

## 2023-06-30 NOTE — ED TRIAGE NOTES
Pt amb to triage.  Chief Complaint   Patient presents with    Abdominal Pain    N/V    Malaise     Symptoms x1wk.

## 2023-06-30 NOTE — ED NOTES
ERP at bedside discussing discharge plan of care. Pt resting with even chest rise and fall, reports no needs at this time, call light available and in reach.

## 2023-06-30 NOTE — ED NOTES
Assumed care of patient, bedside report received from off coming RNTracy.  Introduced self as pt RN, POC discussed, call light in reach.

## 2023-06-30 NOTE — TELEPHONE ENCOUNTER
ED Follow-up  Call Attempts: 1  Date of ED visit: 06/29/23  Call Outcome: Reviewed ED visit with patient  Since you've been home, how have you been feeling?: Better  Were you prescribed any medications?: No  Do you have any questions about your discharge instructions?: No  RN Recommendations: Other  Additional details: Member reported that she prefers to work with her specialists for her medical needs instead of PCP at this time. Appts. with specialists are already scheduled.  Total time spent (mins): 2

## 2023-07-11 NOTE — PROGRESS NOTES
"CARDIOLOGY OUTPATIENT FOLLOWUP    PCP: Jarrell Yates M.D.    1. Coronary artery disease due to lipid rich plaque-MINDY x2 to the LAD 8/10/2020.  Fully revascularized.    2. Hypertension, essential    3. Dyslipidemia    4. Tobacco use    5. Grieving        Anu Manuel is stable from a cardiovascular standpoint.  Blood pressure and cholesterol are well controlled.  She can discontinue clopidogrel continue aspirin and atorvastatin indefinitely.  I changed metoprolol tartrate to succinate for ease of dosing.  I counseled her for 5 minutes about smoking cessation-which she is receptive to.    Follow up: 1 year    Chief Complaint   Patient presents with   • Hypertension   • Chest Pain   • Dyslipidemia       History: Anu Manuel is a 66 y.o. female with history of hypertension dyslipidemia, type 1 diabetes and tobacco use presenting for follow-up of coronary artery disease.  August 2020 she was hospitalized with unstable angina and underwent PCI of the LAD using 2 MINDY.  LVEF was normal.  Since the last time I saw her her heart health has been stable and she has come off of several antihypertensive medications with well-controlled home blood pressure.  Unfortunately, her  did pass away last December and she has been struggling to adjust to life in his absence.  She continues to smoke is now living with her daughter and is smoking less than before.  She has quit in the past.      ROS:   All other systems reviewed and negative except as per the HPI    PE:  /68 (BP Location: Left arm, Patient Position: Sitting, BP Cuff Size: Adult)   Pulse 70   Resp 12   Ht 1.676 m (5' 6\")   Wt 61.5 kg (135 lb 8 oz)   LMP 04/29/2005 (LMP Unknown)   SpO2 97%   BMI 21.87 kg/m²   Gen: no acute distress  HEENT: Symmetric face. Anicteric sclerae. Moist mucus membranes  NECK: No JVD. No lymphadenopathy  CARDIAC: Regular, Normal S1, S2, No murmur  VASCULATURE: carotids are normal bilaterally without " "bruit  RESP: Clear to auscultation bilaterally  ABD: Soft, non-tender, non-distended  EXT: No edema, no clubbing or cyanosis  SKIN: Warm and dry  NEURO: No gross deficits  PSYCH: Appropriate affect, participates in conversation    The 10-year ASCVD risk score (Nelsonia EDWIN Jr., et al., 2013) is: 21.3%    Past Medical History:   Diagnosis Date   • Adverse effect of anesthesia     in 2008 \"throat closes up\"\"anxiety\" ?laryngospasm, kept in ICU. Pt states no problems currently 2018.    • Anesthesia     in 2008 \"throat closes up\"\"anxiety\" ?laryngospasm, kept in ICU. Pt states no problems currently 2018.    • Arthritis     right shoulder, hands   • Cigarette smoker quit 2013   • Dental disorder     missing teeth    • depression 8/30/2016    denies depression, states has anxiety and panic attacks   • Diabetes mellitus type 1 (HCC) 1989    insulin   • Encounter for long-term (current) use of insulin ([facility]) 9/25/2013   • Heart burn    • High cholesterol    • Hypertension    • Hypothyroidism, postsurgical 1970   • Indigestion    • Infectious disease      had hepatitis C, tested neg.   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 N THE EVENING     • SYNTHROID 200 MCG Tab Take 200 mcg by mouth every day.     • SYNTHROID 25 MCG Tab      • metoprolol (TOPROL-XL) 200 MG XL tablet Take 1 tablet by mouth every day. 90 tablet 3   • ACETAMINOPHEN EXTRA STRENGTH 500 MG Tab TAKE 1 2 TABLETS BY MOUTH TWICE A DAY AS NEEDED     • " lidocaine (LIDODERM) 5 % Patch Place 1 Patch on the skin every 24 hours. 15 Patch 1   • Continuous Blood Gluc Sensor (FREESTYLE PARISA 14 DAY SENSOR) Misc 1 MISCELLANEOUS E10.42 CHANGE SENSOR EVERY 14 DAYS   E11.65     • Cholecalciferol (VITAMIN D3 PO) Take 5,000 Units by mouth 2 Times a Day.     • aspirin 81 MG EC tablet Take 1 Tab by mouth every morning. 90 Tab 3   • atorvastatin (LIPITOR) 40 MG Tab Take 1 Tab by mouth every evening. 90 Tab 3   • traZODone (DESYREL) 100 MG Tab Take 100 mg by mouth at bedtime as needed for Sleep.     • insulin aspart (NOVOLOG) 100 UNIT/ML Solution Inject 1-5 Units under the skin 3 times a day before meals. 1 units for every 5 g of carbs   AND  Sliding Scale   150 - 200 = 1 units  201 - 250 = 2 units  251 - 300 = 3 units  301 - 350 = 4 units  351 - 400 = 5 units     • [DISCONTINUED] clopidogrel (PLAVIX) 75 MG Tab TAKE 1 TABLET BY MOUTH EVERY DAY 90 tablet 0   • [DISCONTINUED] amoxicillin (AMOXIL) 500 MG Cap TAKE 1 CAPSULE BY MOUTH THREE TIMES A DAY UNTIL GONE (Patient not taking: Reported on 5/19/2021)     • [DISCONTINUED] chlorhexidine (PERIDEX) 0.12 % Solution TAKE 15 ML BY MOUTH SWISH AND SPIT THREE TIMES A DAY (Patient not taking: Reported on 5/19/2021)     • [DISCONTINUED] FLUoxetine (PROZAC) 10 MG Cap Take 10 mg by mouth every day. (Patient not taking: Reported on 5/19/2021)     • [DISCONTINUED] HYDROcodone-acetaminophen (NORCO) 5-325 MG Tab per tablet TAKE 1 TABLET BY MOUTH EVERY 4 TOO 6 HOURS AS NEEDED FOR PAIN FOR 5 DAYS, K02.9 (Patient not taking: Reported on 5/19/2021)     • [DISCONTINUED] ibuprofen (MOTRIN) 600 MG Tab TAKE 1 TABLET BY MOUTH EVERY 4 TO 6 HOURS AS NEEDED FOR PAIN     • [DISCONTINUED] methylPREDNISolone (MEDROL DOSEPAK) 4 MG Tablet Therapy Pack Use as directed 1 Each 0   • [DISCONTINUED] lisinopril-hydrochlorothiazide (PRINZIDE) 20-25 MG per tablet Take 0.5 Tabs by mouth every day. 90 Tab 3   • [DISCONTINUED] ondansetron (ZOFRAN ODT) 4 MG TABLET DISPERSIBLE  Take 1 Tab by mouth every 6 hours as needed. (Patient not taking: Reported on 3/2/2021) 8 Tab 0   • [DISCONTINUED] omeprazole (PRILOSEC) 40 MG delayed-release capsule Take 1 Cap by mouth every day. (Patient taking differently: Take 40 mg by mouth 2 times a day.) 30 Cap 0   • [DISCONTINUED] levothyroxine (SYNTHROID) 175 MCG Tab Take 175 mcg by mouth Every morning on an empty stomach. (Patient not taking: Reported on 8/3/2021)     • [DISCONTINUED] Pyridoxine HCl (VITAMIN B6 PO) Take 1 tablet by mouth every day.     • [DISCONTINUED] mesalamine (LIALDA) 1.2 GM Tablet Delayed Response Take 2.4 g by mouth every bedtime.     • [DISCONTINUED] Biotin 5000 MCG Tab Take 10,000 mcg by mouth every day.     • [DISCONTINUED] metoprolol (LOPRESSOR) 100 MG Tab Take 1 Tab by mouth 2 Times a Day. 180 Tab 3   • [DISCONTINUED] Insulin Degludec (TRESIBA) 100 UNIT/ML Solution Inject 15 Units as instructed every evening. (Patient taking differently: Inject 22 Units under the skin every evening.) 1 mL 0     No facility-administered encounter medications on file as of 8/3/2021.     Social History     Socioeconomic History   • Marital status:      Spouse name: Not on file   • Number of children: Not on file   • Years of education: Not on file   • Highest education level: Not on file   Occupational History   • Not on file   Tobacco Use   • Smoking status: Current Every Day Smoker     Packs/day: 1.00     Years: 30.00     Pack years: 30.00     Types: Cigarettes   • Smokeless tobacco: Never Used   • Tobacco comment: 1 ppd    Vaping Use   • Vaping Use: Never used   Substance and Sexual Activity   • Alcohol use: No     Comment:     • Drug use: No   • Sexual activity: Not on file   Other Topics Concern   • Not on file   Social History Narrative   • Not on file     Social Determinants of Health     Financial Resource Strain:    • Difficulty of Paying Living Expenses:    Food Insecurity:    • Worried About Running Out of Food in the Last Year:     • Ran Out of Food in the Last Year:    Transportation Needs:    • Lack of Transportation (Medical):    • Lack of Transportation (Non-Medical):    Physical Activity:    • Days of Exercise per Week:    • Minutes of Exercise per Session:    Stress:    • Feeling of Stress :    Social Connections:    • Frequency of Communication with Friends and Family:    • Frequency of Social Gatherings with Friends and Family:    • Attends [employer] Services:    • Active Member of Clubs or Organizations:    • Attends Club or Organization Meetings:    • Marital Status:    Intimate Partner Violence:    • Fear of Current or Ex-Partner:    • Emotionally Abused:    • Physically Abused:    • Sexually Abused:        Studies  Lab Results   Component Value Date/Time    CHOLSTRLTOT 132 02/09/2021 01:51 PM    LDL 69 02/09/2021 01:51 PM    HDL 41 02/09/2021 01:51 PM    TRIGLYCERIDE 112 02/09/2021 01:51 PM       Lab Results   Component Value Date/Time    SODIUM 143 05/27/2021 12:06 PM    POTASSIUM 4.0 05/27/2021 12:06 PM    CHLORIDE 106 05/27/2021 12:06 PM    CO2 22 05/27/2021 12:06 PM    GLUCOSE 69 05/27/2021 12:06 PM    BUN 21 05/27/2021 12:06 PM    CREATININE 1.17 05/27/2021 12:06 PM    CREATININE 1.0 01/08/2009 04:31 PM     Lab Results   Component Value Date/Time    ALKPHOSPHAT 94 05/27/2021 12:04 PM    ASTSGOT 30 05/27/2021 12:04 PM    ALTSGPT 33 05/27/2021 12:04 PM    TBILIRUBIN 0.3 05/27/2021 12:04 PM        For this encounter I reviewed the following medical records BMP, Lipid profile and CBC            Yes...

## 2023-07-17 ENCOUNTER — HOSPITAL ENCOUNTER (OUTPATIENT)
Dept: LAB | Facility: MEDICAL CENTER | Age: 68
End: 2023-07-17
Attending: INTERNAL MEDICINE
Payer: MEDICARE

## 2023-07-17 LAB
25(OH)D3 SERPL-MCNC: 45 NG/ML (ref 30–100)
ALBUMIN SERPL BCP-MCNC: 2.7 G/DL (ref 3.2–4.9)
ALBUMIN SERPL BCP-MCNC: 2.8 G/DL (ref 3.2–4.9)
ALBUMIN/GLOB SERPL: 0.5 G/DL
ALP SERPL-CCNC: 1216 U/L (ref 30–99)
ALP SERPL-CCNC: 1216 U/L (ref 30–99)
ALT SERPL-CCNC: 13 U/L (ref 2–50)
ALT SERPL-CCNC: 13 U/L (ref 2–50)
ANION GAP SERPL CALC-SCNC: 10 MMOL/L (ref 7–16)
AST SERPL-CCNC: 20 U/L (ref 12–45)
AST SERPL-CCNC: 23 U/L (ref 12–45)
BASOPHILS # BLD AUTO: 1.8 % (ref 0–1.8)
BASOPHILS # BLD: 0.15 K/UL (ref 0–0.12)
BILIRUB CONJ SERPL-MCNC: 0.7 MG/DL (ref 0.1–0.5)
BILIRUB INDIRECT SERPL-MCNC: 0.3 MG/DL (ref 0–1)
BILIRUB SERPL-MCNC: 1 MG/DL (ref 0.1–1.5)
BILIRUB SERPL-MCNC: 1 MG/DL (ref 0.1–1.5)
BUN SERPL-MCNC: 23 MG/DL (ref 8–22)
CALCIUM ALBUM COR SERPL-MCNC: 9.5 MG/DL (ref 8.5–10.5)
CALCIUM SERPL-MCNC: 8.5 MG/DL (ref 8.5–10.5)
CHLORIDE SERPL-SCNC: 103 MMOL/L (ref 96–112)
CO2 SERPL-SCNC: 23 MMOL/L (ref 20–33)
CREAT SERPL-MCNC: 1.41 MG/DL (ref 0.5–1.4)
EOSINOPHIL # BLD AUTO: 0.15 K/UL (ref 0–0.51)
EOSINOPHIL NFR BLD: 1.8 % (ref 0–6.9)
ERYTHROCYTE [DISTWIDTH] IN BLOOD BY AUTOMATED COUNT: 52.9 FL (ref 35.9–50)
GFR SERPLBLD CREATININE-BSD FMLA CKD-EPI: 41 ML/MIN/1.73 M 2
GLOBULIN SER CALC-MCNC: 5.1 G/DL (ref 1.9–3.5)
GLUCOSE SERPL-MCNC: 225 MG/DL (ref 65–99)
HCT VFR BLD AUTO: 27.7 % (ref 37–47)
HGB BLD-MCNC: 8.6 G/DL (ref 12–16)
IMM GRANULOCYTES # BLD AUTO: 0.21 K/UL (ref 0–0.11)
IMM GRANULOCYTES NFR BLD AUTO: 2.5 % (ref 0–0.9)
INR PPP: 1.01 (ref 0.87–1.13)
LYMPHOCYTES # BLD AUTO: 1.39 K/UL (ref 1–4.8)
LYMPHOCYTES NFR BLD: 16.5 % (ref 22–41)
MCH RBC QN AUTO: 30.2 PG (ref 27–33)
MCHC RBC AUTO-ENTMCNC: 31 G/DL (ref 32.2–35.5)
MCV RBC AUTO: 97.2 FL (ref 81.4–97.8)
MONOCYTES # BLD AUTO: 0.51 K/UL (ref 0–0.85)
MONOCYTES NFR BLD AUTO: 6.1 % (ref 0–13.4)
NEUTROPHILS # BLD AUTO: 6.01 K/UL (ref 1.82–7.42)
NEUTROPHILS NFR BLD: 71.3 % (ref 44–72)
NRBC # BLD AUTO: 0 K/UL
NRBC BLD-RTO: 0 /100 WBC (ref 0–0.2)
PLATELET # BLD AUTO: 801 K/UL (ref 164–446)
PMV BLD AUTO: 10.5 FL (ref 9–12.9)
POTASSIUM SERPL-SCNC: 4.3 MMOL/L (ref 3.6–5.5)
PROT SERPL-MCNC: 7.7 G/DL (ref 6–8.2)
PROT SERPL-MCNC: 7.9 G/DL (ref 6–8.2)
PROTHROMBIN TIME: 13.2 SEC (ref 12–14.6)
RBC # BLD AUTO: 2.85 M/UL (ref 4.2–5.4)
SODIUM SERPL-SCNC: 136 MMOL/L (ref 135–145)
WBC # BLD AUTO: 8.4 K/UL (ref 4.8–10.8)

## 2023-07-17 PROCEDURE — 83520 IMMUNOASSAY QUANT NOS NONAB: CPT | Mod: 91

## 2023-07-17 PROCEDURE — 86256 FLUORESCENT ANTIBODY TITER: CPT

## 2023-07-17 PROCEDURE — 36415 COLL VENOUS BLD VENIPUNCTURE: CPT

## 2023-07-17 PROCEDURE — 80053 COMPREHEN METABOLIC PANEL: CPT

## 2023-07-17 PROCEDURE — 82306 VITAMIN D 25 HYDROXY: CPT

## 2023-07-17 PROCEDURE — 85610 PROTHROMBIN TIME: CPT

## 2023-07-17 PROCEDURE — 82239 BILE ACIDS TOTAL: CPT

## 2023-07-17 PROCEDURE — 83516 IMMUNOASSAY NONANTIBODY: CPT

## 2023-07-17 PROCEDURE — 85025 COMPLETE CBC W/AUTO DIFF WBC: CPT

## 2023-07-17 PROCEDURE — 80076 HEPATIC FUNCTION PANEL: CPT

## 2023-07-17 PROCEDURE — 86038 ANTINUCLEAR ANTIBODIES: CPT

## 2023-07-18 ENCOUNTER — HOSPITAL ENCOUNTER (EMERGENCY)
Facility: MEDICAL CENTER | Age: 68
End: 2023-07-18
Attending: EMERGENCY MEDICINE
Payer: MEDICARE

## 2023-07-18 VITALS
TEMPERATURE: 98.2 F | HEART RATE: 70 BPM | OXYGEN SATURATION: 98 % | SYSTOLIC BLOOD PRESSURE: 165 MMHG | WEIGHT: 130.07 LBS | BODY MASS INDEX: 20.9 KG/M2 | RESPIRATION RATE: 16 BRPM | HEIGHT: 66 IN | DIASTOLIC BLOOD PRESSURE: 88 MMHG

## 2023-07-18 DIAGNOSIS — R60.9 DEPENDENT EDEMA: ICD-10-CM

## 2023-07-18 DIAGNOSIS — R11.2 NAUSEA AND VOMITING, UNSPECIFIED VOMITING TYPE: ICD-10-CM

## 2023-07-18 LAB
ALBUMIN SERPL BCP-MCNC: 2.2 G/DL (ref 3.2–4.9)
ALBUMIN/GLOB SERPL: 0.4 G/DL
ALP SERPL-CCNC: 1424 U/L (ref 30–99)
ALT SERPL-CCNC: 11 U/L (ref 2–50)
AMMONIA PLAS-SCNC: 30 UMOL/L (ref 11–45)
ANION GAP SERPL CALC-SCNC: 12 MMOL/L (ref 7–16)
APTT PPP: 39.4 SEC (ref 24.7–36)
AST SERPL-CCNC: 21 U/L (ref 12–45)
BASOPHILS # BLD AUTO: 2 % (ref 0–1.8)
BASOPHILS # BLD: 0.15 K/UL (ref 0–0.12)
BILIRUB SERPL-MCNC: 0.8 MG/DL (ref 0.1–1.5)
BUN SERPL-MCNC: 20 MG/DL (ref 8–22)
CALCIUM ALBUM COR SERPL-MCNC: 9.7 MG/DL (ref 8.5–10.5)
CALCIUM SERPL-MCNC: 8.3 MG/DL (ref 8.4–10.2)
CHLORIDE SERPL-SCNC: 105 MMOL/L (ref 96–112)
CO2 SERPL-SCNC: 20 MMOL/L (ref 20–33)
CREAT SERPL-MCNC: 1.33 MG/DL (ref 0.5–1.4)
EOSINOPHIL # BLD AUTO: 0.14 K/UL (ref 0–0.51)
EOSINOPHIL NFR BLD: 1.8 % (ref 0–6.9)
ERYTHROCYTE [DISTWIDTH] IN BLOOD BY AUTOMATED COUNT: 52.7 FL (ref 35.9–50)
GFR SERPLBLD CREATININE-BSD FMLA CKD-EPI: 44 ML/MIN/1.73 M 2
GLOBULIN SER CALC-MCNC: 5.2 G/DL (ref 1.9–3.5)
GLUCOSE SERPL-MCNC: 132 MG/DL (ref 65–99)
HCT VFR BLD AUTO: 27.3 % (ref 37–47)
HGB BLD-MCNC: 8.3 G/DL (ref 12–16)
IMM GRANULOCYTES # BLD AUTO: 0.14 K/UL (ref 0–0.11)
IMM GRANULOCYTES NFR BLD AUTO: 1.8 % (ref 0–0.9)
INR PPP: 1.01 (ref 0.87–1.13)
LIPASE SERPL-CCNC: 6 U/L (ref 7–58)
LYMPHOCYTES # BLD AUTO: 1.37 K/UL (ref 1–4.8)
LYMPHOCYTES NFR BLD: 17.9 % (ref 22–41)
MCH RBC QN AUTO: 29.9 PG (ref 27–33)
MCHC RBC AUTO-ENTMCNC: 30.4 G/DL (ref 32.2–35.5)
MCV RBC AUTO: 98.2 FL (ref 81.4–97.8)
MONOCYTES # BLD AUTO: 0.52 K/UL (ref 0–0.85)
MONOCYTES NFR BLD AUTO: 6.8 % (ref 0–13.4)
NEUTROPHILS # BLD AUTO: 5.33 K/UL (ref 1.82–7.42)
NEUTROPHILS NFR BLD: 69.7 % (ref 44–72)
NRBC # BLD AUTO: 0 K/UL
NRBC BLD-RTO: 0 /100 WBC (ref 0–0.2)
PLATELET # BLD AUTO: 685 K/UL (ref 164–446)
PMV BLD AUTO: 9.2 FL (ref 9–12.9)
POTASSIUM SERPL-SCNC: 4.1 MMOL/L (ref 3.6–5.5)
PROT SERPL-MCNC: 7.4 G/DL (ref 6–8.2)
PROTHROMBIN TIME: 13.8 SEC (ref 12–14.6)
RBC # BLD AUTO: 2.78 M/UL (ref 4.2–5.4)
SODIUM SERPL-SCNC: 137 MMOL/L (ref 135–145)
WBC # BLD AUTO: 7.7 K/UL (ref 4.8–10.8)

## 2023-07-18 PROCEDURE — 82140 ASSAY OF AMMONIA: CPT

## 2023-07-18 PROCEDURE — 83690 ASSAY OF LIPASE: CPT

## 2023-07-18 PROCEDURE — 85730 THROMBOPLASTIN TIME PARTIAL: CPT

## 2023-07-18 PROCEDURE — 80053 COMPREHEN METABOLIC PANEL: CPT

## 2023-07-18 PROCEDURE — 85025 COMPLETE CBC W/AUTO DIFF WBC: CPT

## 2023-07-18 PROCEDURE — 85610 PROTHROMBIN TIME: CPT

## 2023-07-18 PROCEDURE — 94760 N-INVAS EAR/PLS OXIMETRY 1: CPT

## 2023-07-18 PROCEDURE — 36415 COLL VENOUS BLD VENIPUNCTURE: CPT

## 2023-07-18 PROCEDURE — 99284 EMERGENCY DEPT VISIT MOD MDM: CPT | Mod: 25

## 2023-07-18 RX ORDER — FUROSEMIDE 20 MG/1
20 TABLET ORAL DAILY
Qty: 10 TABLET | Refills: 0 | Status: SHIPPED | OUTPATIENT
Start: 2023-07-18 | End: 2023-08-22

## 2023-07-18 ASSESSMENT — FIBROSIS 4 INDEX: FIB4 SCORE: 0.46

## 2023-07-18 ASSESSMENT — LIFESTYLE VARIABLES
TOTAL SCORE: 0
HAVE PEOPLE ANNOYED YOU BY CRITICIZING YOUR DRINKING: NO
HOW MANY TIMES IN THE PAST YEAR HAVE YOU HAD 5 OR MORE DRINKS IN A DAY: 0
EVER FELT BAD OR GUILTY ABOUT YOUR DRINKING: NO
TOTAL SCORE: 0
HAVE YOU EVER FELT YOU SHOULD CUT DOWN ON YOUR DRINKING: NO
CONSUMPTION TOTAL: NEGATIVE
TOTAL SCORE: 0
EVER HAD A DRINK FIRST THING IN THE MORNING TO STEADY YOUR NERVES TO GET RID OF A HANGOVER: NO
AVERAGE NUMBER OF DAYS PER WEEK YOU HAVE A DRINK CONTAINING ALCOHOL: 0
ON A TYPICAL DAY WHEN YOU DRINK ALCOHOL HOW MANY DRINKS DO YOU HAVE: 0
DO YOU DRINK ALCOHOL: NO

## 2023-07-18 NOTE — ED TRIAGE NOTES
"Pt comes in by herself  c/o \"fluid overloading\"  noted wt gain per pt  Hx of autoimmune disorder that is affecting her liver  having fullness to abdomen  tenderness to liver and BERTA Q pain  noticed she was turning yellow as well    was told by her PCP to come to hospital to get fluid off her liver   "

## 2023-07-19 LAB — BILE AC SERPL-SCNC: 61 UMOL/L (ref 0–10)

## 2023-07-19 NOTE — DISCHARGE INSTRUCTIONS
Lab test results for the kidney, and liver are unchanged from previous.  Your bilirubin is normal.  And there is no signs of liver dysfunction.    You do have some edema in the lower extremities, be placed on low-dose of's Lasix for 10 days to see if this will help.  Follow-up with the primary doctor is important.  Please call tomorrow to follow-up with your specialist, or primary physician.  Return for change or worsening symptoms

## 2023-07-19 NOTE — ED NOTES
PIV estbalished after 3 attempts. Was able to get most of the rainbow and then IV stop giving blood. Lab called to collect CBC and ammonia level .

## 2023-07-19 NOTE — ED NOTES
Pt ambulated on her own back to ED 2. Pt changed into gown and placed on monitor. Pt is updated on POC. Gigirney in the lowest position, side rails are up and call light within reach. Pt educated to let RN know if condition gets worst or if the pt needs anything.

## 2023-07-19 NOTE — ED PROVIDER NOTES
"ER Provider Note    Scribed for Jong Burrell D.O. by Meliza Mo. 7/18/2023  7:54 PM    Primary Care Provider: Jarrell Yates M.D.    CHIEF COMPLAINT  Chief Complaint   Patient presents with    Abdominal Pain     Has been gaining wt  believes she is is having fluid build up in liver   was told by her PCP for further evaluation     N/V     The last couple of weeks     Breast Swelling     Woke up w/ R breast enlarged yesterday   has not had this issue prior      Jaundice     Noticed she was turning yellow       HPI/ROS  LIMITATION TO HISTORY   None    OUTSIDE HISTORIAN(S)  None    Anu Manuel is a 68 y.o. female who presents to the Emergency Department for jaundice onset one day ago. The patent explains that she has a history of an autoimmune liver disease and is on medications for it. She woke up yesterday with a swollen right breast that did not go away and progressed to having a swollen abdomen. She then noticed that her skin started becoming jaundiced so she is worry about having a fluid build up in her liver. She is on hydrochlorothiazide. She also experiences chest tightness, nausea, and vomiting, but no fever, chills, cough, or congestion.    ROS as per HPI.    PAST MEDICAL HISTORY  Past Medical History:   Diagnosis Date    Adverse effect of anesthesia     in 2008 \"throat closes up\"\"anxiety\" ?laryngospasm, kept in ICU. Pt states no problems currently 2018.     Anemia     Anesthesia     in 2008 \"throat closes up\"\"anxiety\" ?laryngospasm, kept in ICU. Pt states no problems currently 2018.     Arthritis     right shoulder, hands    Bowel habit changes More frequent    Cataract 2022    Cigarette smoker quit 2013    Dental disorder     missing teeth; Partial plate on top    depression 08/30/2016    denies depression, states has anxiety and panic attacks    Diabetes mellitus type 1 (HCC) 1989    insulin    Dialysis patient (HCC) Short time due to a kidney injury from a infection    Encounter for " "long-term (current) use of insulin (HCC) 09/25/2013    Heart burn     High cholesterol     Hypertension     Hypothyroidism, postsurgical 1970    Indigestion     Infectious disease      had hepatitis C, tested neg.    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) 06/10/2015       SURGICAL HISTORY  Past Surgical History:   Procedure Laterality Date    [place] ERCP,DIAGNOSTIC N/A 01/14/2022    Procedure: ERCP, DIAGNOSTIC - WITH SIGMOID COLON BIOPSY AND STENT REMOVAL;  Surgeon: Cr Haro M.D.;  Location: SURGERY [facility];  Service: Gastroenterology    THADDEUS BY LAPAROSCOPY N/A 10/28/2021    Procedure: CHOLECYSTECTOMY, LAPAROSCOPIC;  Surgeon: Tello Trammell M.D.;  Location: [facility];  Service: General    WV ERCP,DIAGNOSTIC N/A 10/26/2021    Procedure: ERCP (ENDOSCOPIC RETROGRADE CHOLANGIOPANCREATOGRAPHY);  Surgeon: Cr Haro M.D.;  Location: SURGERY SAME DAY [facility];  Service: Gastroenterology    WV UPPER GI ENDOSCOPY,BIOPSY N/A 10/26/2021    Procedure: GASTROSCOPY, WITH BIOPSY;  Surgeon: Cr Haro M.D.;  Location: SURGERY SAME DAY [facility];  Service: Gastroenterology    [place] UPPER GI ENDOSCOPY,DIAGNOSIS N/A 10/26/2021    Procedure: GASTROSCOPY;  Surgeon: Cr Haro M.D.;  Location: SURGERY SAME DAY [facility];  Service: Gastroenterology    CATH PLACEMENT  01/25/2020    Procedure: INSERTION, CATHETER PERM;  Surgeon: Rola Mendoza M.D.;  Location: [facility];  Service: General    IRRIGATION & DEBRIDEMENT NEURO  01/19/2020    Procedure: IRRIGATION AND DEBRIDEMENT, WOUND THORACIC AND LUMBAR;  Surgeon: Ryan Roman M.D.;  Location: [facility];  Service: Neurosurgery    PB IMPLANT " NEUROSTIM/ N/A 12/16/2019    Procedure: EXPLORATION AT THORACIC 8 - 9, REPLACEMENT OF  NEUROSTIMULATOR LEAD;  Surgeon: Ryan Roman M.D.;  Location: SURGERY [facility];  Service: Neurosurgery    SPINAL CORD STIMULATOR N/A 10/26/2018    Procedure: SPINAL CORD STIMULATOR;  Surgeon: Ryan Roman M.D.;  Location: SURGERY [facility];  Service: Neurosurgery    THORACIC LAMINECTOMY N/A 10/26/2018    Procedure: THORACIC LAMINECTOMY - FOR;  Surgeon: Ryan Roman M.D.;  Location: [facility];  Service: Neurosurgery    [place] NJX AA&/STRD TFRML EPI LUMBAR/SACRAL 1 LEVEL Right 08/31/2016    Procedure: INJ-FORAMEN EPI LUM/SAC SNGL L4-5;  Surgeon: Sukhi Godfrey M.D.;  Location: [facility];  Service: Pain Management    LUMBAR LAMINECTOMY DISKECTOMY Right 05/10/2016    Procedure: LUMBAR L4-5 HEMILAMINECTOMY DISKECTOMY ;  Surgeon: Arnold Keyes M.D.;  Location: [facility];  Service:     CERVICAL FUSION POSTERIOR  01/16/2009    Performed by TARA CONTRERAS at [facility]    HARDWARE REMOVAL NEURO  01/16/2009    Performed by TARA CONTRERAS at [facility]    NECK EXPLORATION  01/16/2009    Performed by TARA CONTRERAS at [facility]    SHOULDER ARTHROSCOPY W/ ROTATOR CUFF REPAIR  10/09/2008    Performed by SHERLY CASTANEDA at [facility]    SHOULDER DECOMPRESSION ARTHROSCOPIC  06/17/2008    Performed by SHERLY CASTANEDA at [facility]    CLAVICLE DISTAL EXCISION  06/17/2008    Performed by SHERLY CASTANEDA at [facility]    CERVICAL DISK AND FUSION ANTERIOR  03/12/2008    HYSTERECTOMY, VAGINAL  2006    APPENDECTOMY  2004    THYROID LOBECTOMY  1973    TONSILLECTOMY  1963    LUMPECTOMY  1976, 2005    Breast     LUMPECTOMY      OTHER ABDOMINAL SURGERY  2004    OTHER CARDIAC SURGERY  2020 stents inserted       FAMILY HISTORY  Family History   Problem Relation Age of Onset    Hypertension Mother     Cancer  Father        SOCIAL HISTORY   reports that she has been smoking cigarettes. She has a 15.00 pack-year smoking history. She has never used smokeless tobacco. She reports that she does not currently use alcohol. She reports current drug use. Drugs: Inhaled, Oral, and Marijuana.    CURRENT MEDICATIONS  Discharge Medication List as of 7/18/2023  9:45 PM        CONTINUE these medications which have NOT CHANGED    Details   pravastatin (PRAVACHOL) 40 MG tablet TAKE 1 TABLET BY MOUTH EVERY DAY IN THE EVENINGPlease keep current appointment to ensure future refills.Disp-100 Tablet, R-0, Normal      famotidine (PEPCID) 20 MG Tab TAKE 1 TABLET BY MOUTH TWICE A DAY, Disp-60 Tablet, R-2, Normal      fluconazole (DIFLUCAN) 150 MG tablet Take 1 Tablet by mouth every day., Disp-1 Tablet, R-0, Normal      !! potassium chloride (MICRO-K) 10 MEQ capsule Take 1 Capsule by mouth every morning., Historical Med      BD PEN NEEDLE SOFIA U/F For use with 2-3 daily insulin, Disp-400 Each, R-3, JAGDISH, Normal      Insulin Glargine, 1 Unit Dial, (TOUJEO SOLOSTAR) 300 UNIT/ML Solution Pen-injector Inject 45 Units under the skin at bedtime., Disp-13.5 mL, R-3, Normal      Continuous Blood Gluc Transmit (DEXCOM G6 TRANSMITTER) Misc Change every 3 months, Disp-1 Each, R-3, Normal      insulin aspart (NOVOLOG FLEXPEN) 100 UNIT/ML injection PEN Inject 15 Units under the skin 3 times a day before meals., Disp-45 mL, R-3, Normal      riFAMPin (RIFADINE) 300 MG Cap Take 300 Capsules by mouth 2 times a day., Historical Med      oxyCODONE-acetaminophen (PERCOCET) 7.5-325 MG per tablet Take  by mouth as needed., Historical Med      cyanocobalamin (VITAMIN B12) 2500 MCG SL Tab Place  under the tongue., Historical Med      ursodiol (ACTIGALL) 300 MG Cap Take 300 mg by mouth 3 times a day., Historical Med      Vitamin D, Ergocalciferol, 50 MCG (2000 UT) Cap Take 5,000 Units by mouth every day., Historical Med      clindamycin (CLEOCIN) 1 % Solution APPLY TO  "AFFECTED TWICE A DAY FOR 1-2 WEEKS, Disp-60 mL, R-1, Normal      SYNTHROID 125 MCG Tab Take 250 mcg by mouth every day., JAGDISH, Historical Med      !! potassium chloride (MICRO-K) 10 MEQ capsule Take 10 mEq by mouth every day., Historical Med      hydroCHLOROthiazide (HYDRODIURIL) 25 MG Tab Take 25 mg by mouth every day., Historical Med      !! metoprolol SR (TOPROL XL) 50 MG TABLET SR 24 HR 50 mg every morning., Historical Med      !! metoprolol SR (TOPROL XL) 100 MG TABLET SR 24  mg every morning., Historical Med      ezetimibe (ZETIA) 10 MG Tab 10 mg every evening., Historical Med      traZODone (DESYREL) 100 MG Tab Take 100 mg by mouth at bedtime., Historical Med       !! - Potential duplicate medications found. Please discuss with provider.          ALLERGIES  Tape    PHYSICAL EXAM  BP (!) 157/88   Pulse 74   Temp 36.7 °C (98.1 °F) (Temporal)   Resp 18   Ht 1.676 m (5' 6\")   Wt 59 kg (130 lb 1.1 oz)   LMP 04/29/2005 (LMP Unknown)   SpO2 99%   BMI 20.99 kg/m²     General: No acute distress.  HENT: Normocephalic, Mucus membranes are moist. Scleral icterus.   Chest: Lungs have even and unlabored respirations, Clear to auscultation.   Cardiovascular: Regular rate and regular rhythm, No peripheral cyanosis.  Abdomen: Non distended.  Neuro: Awake, Conversive, Able to relay recent events.  Psychiatric: Calm and cooperative.     EXTERNAL RECORDS REVIEWED  Review of patient's past medical records show she was seen on 06/29/2023 for similar symptoms.     INITIAL ASSESSMENT  Patient is jaundiced and is complaining of increased swelling. There are concerns for worsening renal failure and renal disease. We will evaluate with labs. Her abdomen is non tender and she has no fever. Her abdomen is relatively flat and has no indication for paracentesis at this time.    ED Observation Status? Yes; I am placing the patient in to an observation status due to a diagnostic uncertainty as well as therapeutic intensity. " Patient placed in observation status at 8:03 PM, 7/18/2023.     Observation plan is as follows: Labs to evaluate.    Upon Reevaluation, the patient's condition has: Improved; and will be discharged.    Patient discharged from ED Observation status at 9:29 PM (Time) 7/18/2023 (Date).     DIAGNOSTIC STUDIES    Labs:   Results for orders placed or performed during the hospital encounter of 07/18/23   CBC WITH DIFFERENTIAL   Result Value Ref Range    WBC 7.7 4.8 - 10.8 K/uL    RBC 2.78 (L) 4.20 - 5.40 M/uL    Hemoglobin 8.3 (L) 12.0 - 16.0 g/dL    Hematocrit 27.3 (L) 37.0 - 47.0 %    MCV 98.2 (H) 81.4 - 97.8 fL    MCH 29.9 27.0 - 33.0 pg    MCHC 30.4 (L) 32.2 - 35.5 g/dL    RDW 52.7 (H) 35.9 - 50.0 fL    Platelet Count 685 (H) 164 - 446 K/uL    MPV 9.2 9.0 - 12.9 fL    Neutrophils-Polys 69.70 44.00 - 72.00 %    Lymphocytes 17.90 (L) 22.00 - 41.00 %    Monocytes 6.80 0.00 - 13.40 %    Eosinophils 1.80 0.00 - 6.90 %    Basophils 2.00 (H) 0.00 - 1.80 %    Immature Granulocytes 1.80 (H) 0.00 - 0.90 %    Nucleated RBC 0.00 0.00 - 0.20 /100 WBC    Neutrophils (Absolute) 5.33 1.82 - 7.42 K/uL    Lymphs (Absolute) 1.37 1.00 - 4.80 K/uL    Monos (Absolute) 0.52 0.00 - 0.85 K/uL    Eos (Absolute) 0.14 0.00 - 0.51 K/uL    Baso (Absolute) 0.15 (H) 0.00 - 0.12 K/uL    Immature Granulocytes (abs) 0.14 (H) 0.00 - 0.11 K/uL    NRBC (Absolute) 0.00 K/uL   COMP METABOLIC PANEL   Result Value Ref Range    Sodium 137 135 - 145 mmol/L    Potassium 4.1 3.6 - 5.5 mmol/L    Chloride 105 96 - 112 mmol/L    Co2 20 20 - 33 mmol/L    Anion Gap 12.0 7.0 - 16.0    Glucose 132 (H) 65 - 99 mg/dL    Bun 20 8 - 22 mg/dL    Creatinine 1.33 0.50 - 1.40 mg/dL    Calcium 8.3 (L) 8.4 - 10.2 mg/dL    AST(SGOT) 21 12 - 45 U/L    ALT(SGPT) 11 2 - 50 U/L    Alkaline Phosphatase 1424 (H) 30 - 99 U/L    Total Bilirubin 0.8 0.1 - 1.5 mg/dL    Albumin 2.2 (L) 3.2 - 4.9 g/dL    Total Protein 7.4 6.0 - 8.2 g/dL    Globulin 5.2 (H) 1.9 - 3.5 g/dL    A-G Ratio 0.4 g/dL    LIPASE   Result Value Ref Range    Lipase 6 (L) 7 - 58 U/L   APTT   Result Value Ref Range    APTT 39.4 (H) 24.7 - 36.0 sec   PROTHROMBIN TIME (INR)   Result Value Ref Range    PT 13.8 12.0 - 14.6 sec    INR 1.01 0.87 - 1.13   AMMONIA   Result Value Ref Range    Ammonia 30 11 - 45 umol/L   CORRECTED CALCIUM   Result Value Ref Range    Correct Calcium 9.7 8.5 - 10.5 mg/dL   ESTIMATED GFR   Result Value Ref Range    GFR (CKD-EPI) 44 (A) >60 mL/min/1.73 m 2     All labs reviewed by me.       COURSE & MEDICAL DECISION MAKING     COURSE AND PLAN  7:54 PM - Patient seen and examined at bedside. Discussed plan of care, including labs. Patient agrees to the plan of care. Ordered for Ammonia, Prothrombin INR, APTT, Lipase, CMP, and CBC w/ Diff to evaluate her symptoms.     9:29 PM - Patient was reevaluated at bedside. Discussed lab results with the patient. Patient had the opportunity to ask any questions. The plan for discharge was discussed with them and she was told to return for any new or worsening symptoms. She was also informed of the plans for follow up. Patient is understanding and agreeable to the plan for discharge.       ED Summary: Patient has a history of autoimmune disease of the liver, there was concerns of jaundice, when I evaluated her she seemed to have some scleral icterus.  She does have a mild swelling of the lower extremity, no other swelling is noted by me.    Lab test were done, her alkaline phosphatase is significant elevated but is in line with what she has typically run.  Her GFR is 44 which is improved from her more recent lab test.  Her bilirubin is normal.  There is no signs of liver failure and her INR is normal.  She is stable for discharge home to follow-up with her primary doctors.  She was placed on a low-dose of Lasix for couple days to improve the swelling in the lower extremities.    Decision tools and prescription drugs considered including, but not limited to: Lasix 20 mg  tablet.    DISPOSITION AND DISCUSSIONS  I have discussed management of the patient with the following physicians and MILAN's: None.    Discussion of management with other [facility] or appropriate source(s): None.    Barriers to care at this time, including but not limited to: None.     The patient will return for new or worsening symptoms and is stable at the time of discharge.    The patient is referred to a primary physician for blood pressure management, diabetic screening, and for all other preventative health concerns.    DISPOSITION:  Patient will be discharged home in stable condition.    FOLLOW UP:  Jarrell Yates M.D.  [address]  [address]  [facility] [address]  [phone]    In 3 days      OUTPATIENT MEDICATIONS:  Discharge Medication List as of 7/18/2023  9:45 PM        START taking these medications    Details   furosemide (LASIX) 20 MG Tab Take 1 Tablet by mouth every day., Disp-10 Tablet, R-0, Normal            FINAL DIAGNOSIS  1. Nausea and vomiting, unspecified vomiting type    2. Dependent edema        IMeliza (Rogelioibe), am scribing for, and in the presence of, Jong Burrell D.O..    Electronically signed by: Meliza Mo (Rogelioibkayla), 7/18/2023    I, Jong Burrell D.O. personally performed the services described in this documentation, as scribed by Meliza Mo in my presence, and it is both accurate and complete.     The note accurately reflects work and decisions made by me.  Jong Burrell D.O.  7/18/2023  10:03 PM

## 2023-08-11 DIAGNOSIS — Z79.4 LONG-TERM INSULIN USE (HCC): ICD-10-CM

## 2023-08-11 DIAGNOSIS — E89.0 HYPOTHYROIDISM, POSTSURGICAL: ICD-10-CM

## 2023-08-11 DIAGNOSIS — E78.5 DYSLIPIDEMIA: ICD-10-CM

## 2023-08-11 DIAGNOSIS — E55.9 VITAMIN D DEFICIENCY: ICD-10-CM

## 2023-08-11 DIAGNOSIS — E10.9 TYPE 1 DIABETES MELLITUS ON INSULIN THERAPY (HCC): ICD-10-CM

## 2023-08-14 ENCOUNTER — HOSPITAL ENCOUNTER (OUTPATIENT)
Dept: LAB | Facility: MEDICAL CENTER | Age: 68
End: 2023-08-14
Attending: INTERNAL MEDICINE
Payer: MEDICARE

## 2023-08-14 DIAGNOSIS — E87.1 HYPONATREMIA: ICD-10-CM

## 2023-08-14 DIAGNOSIS — I10 ESSENTIAL HYPERTENSION: ICD-10-CM

## 2023-08-14 DIAGNOSIS — N18.9 CHRONIC KIDNEY DISEASE, UNSPECIFIED CKD STAGE: ICD-10-CM

## 2023-08-14 DIAGNOSIS — E87.6 HYPOKALEMIA: ICD-10-CM

## 2023-08-14 LAB
ANION GAP SERPL CALC-SCNC: 9 MMOL/L (ref 7–16)
BUN SERPL-MCNC: 26 MG/DL (ref 8–22)
CALCIUM SERPL-MCNC: 8.1 MG/DL (ref 8.5–10.5)
CHLORIDE SERPL-SCNC: 101 MMOL/L (ref 96–112)
CO2 SERPL-SCNC: 25 MMOL/L (ref 20–33)
CREAT SERPL-MCNC: 1.46 MG/DL (ref 0.5–1.4)
ERYTHROCYTE [DISTWIDTH] IN BLOOD BY AUTOMATED COUNT: 59.1 FL (ref 35.9–50)
GFR SERPLBLD CREATININE-BSD FMLA CKD-EPI: 39 ML/MIN/1.73 M 2
GLUCOSE SERPL-MCNC: 306 MG/DL (ref 65–99)
HCT VFR BLD AUTO: 30.9 % (ref 37–47)
HGB BLD-MCNC: 9.5 G/DL (ref 12–16)
MCH RBC QN AUTO: 30.4 PG (ref 27–33)
MCHC RBC AUTO-ENTMCNC: 30.7 G/DL (ref 32.2–35.5)
MCV RBC AUTO: 99 FL (ref 81.4–97.8)
PLATELET # BLD AUTO: 372 K/UL (ref 164–446)
PMV BLD AUTO: 10.3 FL (ref 9–12.9)
POTASSIUM SERPL-SCNC: 4.4 MMOL/L (ref 3.6–5.5)
RBC # BLD AUTO: 3.12 M/UL (ref 4.2–5.4)
SODIUM SERPL-SCNC: 135 MMOL/L (ref 135–145)
WBC # BLD AUTO: 6.7 K/UL (ref 4.8–10.8)

## 2023-08-14 PROCEDURE — 36415 COLL VENOUS BLD VENIPUNCTURE: CPT

## 2023-08-14 PROCEDURE — 85027 COMPLETE CBC AUTOMATED: CPT

## 2023-08-14 PROCEDURE — 80048 BASIC METABOLIC PNL TOTAL CA: CPT

## 2023-08-17 ENCOUNTER — HOSPITAL ENCOUNTER (OUTPATIENT)
Dept: LAB | Facility: MEDICAL CENTER | Age: 68
End: 2023-08-17
Attending: INTERNAL MEDICINE
Payer: MEDICARE

## 2023-08-17 ENCOUNTER — APPOINTMENT (OUTPATIENT)
Dept: ENDOCRINOLOGY | Facility: MEDICAL CENTER | Age: 68
End: 2023-08-17
Attending: INTERNAL MEDICINE
Payer: MEDICARE

## 2023-08-17 DIAGNOSIS — E10.9 TYPE 1 DIABETES MELLITUS ON INSULIN THERAPY (HCC): ICD-10-CM

## 2023-08-17 DIAGNOSIS — Z79.4 LONG-TERM INSULIN USE (HCC): ICD-10-CM

## 2023-08-17 DIAGNOSIS — E89.0 HYPOTHYROIDISM, POSTSURGICAL: ICD-10-CM

## 2023-08-17 DIAGNOSIS — E78.5 DYSLIPIDEMIA: ICD-10-CM

## 2023-08-17 DIAGNOSIS — E55.9 VITAMIN D DEFICIENCY: ICD-10-CM

## 2023-08-17 LAB
25(OH)D3 SERPL-MCNC: 47 NG/ML (ref 30–100)
ALBUMIN SERPL BCP-MCNC: 3.2 G/DL (ref 3.2–4.9)
ALBUMIN/GLOB SERPL: 0.8 G/DL
ALP SERPL-CCNC: 773 U/L (ref 30–99)
ALT SERPL-CCNC: 25 U/L (ref 2–50)
ANION GAP SERPL CALC-SCNC: 11 MMOL/L (ref 7–16)
AST SERPL-CCNC: 38 U/L (ref 12–45)
BILIRUB SERPL-MCNC: 0.6 MG/DL (ref 0.1–1.5)
BUN SERPL-MCNC: 25 MG/DL (ref 8–22)
CALCIUM ALBUM COR SERPL-MCNC: 8.7 MG/DL (ref 8.5–10.5)
CALCIUM SERPL-MCNC: 8.1 MG/DL (ref 8.5–10.5)
CHLORIDE SERPL-SCNC: 103 MMOL/L (ref 96–112)
CHOLEST SERPL-MCNC: 232 MG/DL (ref 100–199)
CO2 SERPL-SCNC: 25 MMOL/L (ref 20–33)
CREAT SERPL-MCNC: 1.43 MG/DL (ref 0.5–1.4)
FASTING STATUS PATIENT QL REPORTED: NORMAL
GFR SERPLBLD CREATININE-BSD FMLA CKD-EPI: 40 ML/MIN/1.73 M 2
GLOBULIN SER CALC-MCNC: 4.1 G/DL (ref 1.9–3.5)
GLUCOSE SERPL-MCNC: 119 MG/DL (ref 65–99)
HDLC SERPL-MCNC: 145 MG/DL
LDLC SERPL CALC-MCNC: 77 MG/DL
POTASSIUM SERPL-SCNC: 4.3 MMOL/L (ref 3.6–5.5)
PROT SERPL-MCNC: 7.3 G/DL (ref 6–8.2)
SODIUM SERPL-SCNC: 139 MMOL/L (ref 135–145)
T4 FREE SERPL-MCNC: 0.9 NG/DL (ref 0.93–1.7)
TRIGL SERPL-MCNC: 52 MG/DL (ref 0–149)
TSH SERPL DL<=0.005 MIU/L-ACNC: 73.1 UIU/ML (ref 0.38–5.33)

## 2023-08-17 PROCEDURE — 80061 LIPID PANEL: CPT

## 2023-08-17 PROCEDURE — 84443 ASSAY THYROID STIM HORMONE: CPT

## 2023-08-17 PROCEDURE — 82306 VITAMIN D 25 HYDROXY: CPT

## 2023-08-17 PROCEDURE — 80053 COMPREHEN METABOLIC PANEL: CPT

## 2023-08-17 PROCEDURE — 36415 COLL VENOUS BLD VENIPUNCTURE: CPT

## 2023-08-17 PROCEDURE — 84439 ASSAY OF FREE THYROXINE: CPT

## 2023-08-22 ENCOUNTER — OFFICE VISIT (OUTPATIENT)
Dept: NEPHROLOGY | Facility: MEDICAL CENTER | Age: 68
End: 2023-08-22
Payer: MEDICARE

## 2023-08-22 VITALS
DIASTOLIC BLOOD PRESSURE: 82 MMHG | WEIGHT: 140 LBS | BODY MASS INDEX: 22.5 KG/M2 | RESPIRATION RATE: 18 BRPM | SYSTOLIC BLOOD PRESSURE: 162 MMHG | OXYGEN SATURATION: 98 % | HEART RATE: 67 BPM | TEMPERATURE: 98.2 F | HEIGHT: 66 IN

## 2023-08-22 DIAGNOSIS — I10 ESSENTIAL HYPERTENSION: ICD-10-CM

## 2023-08-22 DIAGNOSIS — Z72.0 TOBACCO ABUSE: ICD-10-CM

## 2023-08-22 DIAGNOSIS — R80.9 PROTEINURIA, UNSPECIFIED TYPE: ICD-10-CM

## 2023-08-22 DIAGNOSIS — R60.9 DEPENDENT EDEMA: ICD-10-CM

## 2023-08-22 DIAGNOSIS — N18.31 STAGE 3A CHRONIC KIDNEY DISEASE: ICD-10-CM

## 2023-08-22 DIAGNOSIS — E03.9 HYPOTHYROIDISM, UNSPECIFIED TYPE: ICD-10-CM

## 2023-08-22 PROCEDURE — 99214 OFFICE O/P EST MOD 30 MIN: CPT | Performed by: INTERNAL MEDICINE

## 2023-08-22 PROCEDURE — 3079F DIAST BP 80-89 MM HG: CPT | Performed by: INTERNAL MEDICINE

## 2023-08-22 PROCEDURE — 3077F SYST BP >= 140 MM HG: CPT | Performed by: INTERNAL MEDICINE

## 2023-08-22 RX ORDER — OMEPRAZOLE 40 MG/1
40 CAPSULE, DELAYED RELEASE ORAL DAILY
COMMUNITY

## 2023-08-22 RX ORDER — ONDANSETRON 8 MG/1
8 TABLET, ORALLY DISINTEGRATING ORAL EVERY 8 HOURS PRN
COMMUNITY
Start: 2023-06-29

## 2023-08-22 RX ORDER — FUROSEMIDE 40 MG/1
40 TABLET ORAL 2 TIMES DAILY
Qty: 60 TABLET | Refills: 11 | Status: SHIPPED | OUTPATIENT
Start: 2023-08-22 | End: 2023-09-14

## 2023-08-22 RX ORDER — FENOFIBRATE 48 MG/1
48 TABLET, COATED ORAL EVERY MORNING
COMMUNITY
Start: 2023-07-05

## 2023-08-22 ASSESSMENT — ENCOUNTER SYMPTOMS
HYPERTENSION: 1
CHILLS: 0
COUGH: 0
SHORTNESS OF BREATH: 0
FEVER: 0
NAUSEA: 0
VOMITING: 0

## 2023-08-22 ASSESSMENT — FIBROSIS 4 INDEX: FIB4 SCORE: 1.35

## 2023-08-22 NOTE — PROGRESS NOTES
"Subjective     Asha Manuel is a 68 y.o. female who presents with Hypertension and Chronic Kidney Disease            Patient has a history of diabetes mellitus type 1, hypothyroidism, history of liver disease.  Recently has worsening lower extremity edema.    Hypertension  This is a chronic problem. The current episode started more than 1 year ago. The problem has been waxing and waning since onset. The problem is uncontrolled. Associated symptoms include malaise/fatigue and peripheral edema. Pertinent negatives include no chest pain or shortness of breath. Agents associated with hypertension include thyroid hormones. Risk factors for coronary artery disease include smoking/tobacco exposure and post-menopausal state. Past treatments include diuretics and beta blockers. The current treatment provides mild improvement. Compliance problems include diet.  Hypertensive end-organ damage includes kidney disease. Identifiable causes of hypertension include chronic renal disease.   Chronic Kidney Disease  This is a chronic problem. The current episode started more than 1 year ago. The problem occurs constantly. The problem has been gradually worsening. Pertinent negatives include no chest pain, chills, coughing, fever, nausea, urinary symptoms or vomiting.       Review of Systems   Constitutional:  Positive for malaise/fatigue. Negative for chills and fever.   Respiratory:  Negative for cough and shortness of breath.    Cardiovascular:  Positive for leg swelling. Negative for chest pain.   Gastrointestinal:  Negative for nausea and vomiting.   Genitourinary:  Negative for dysuria, frequency and urgency.              Objective     BP (!) 162/82 (BP Location: Right arm, Patient Position: Sitting, BP Cuff Size: Adult)   Pulse 67   Temp 36.8 °C (98.2 °F) (Temporal)   Resp 18   Ht 1.676 m (5' 6\")   Wt 63.5 kg (140 lb)   LMP 04/29/2005 (LMP Unknown)   SpO2 98%   BMI 22.60 kg/m²      Physical Exam  Vitals and " "nursing note reviewed.   Constitutional:       General: She is not in acute distress.     Appearance: Normal appearance. She is well-developed. She is not diaphoretic.   HENT:      Head: Normocephalic and atraumatic.      Right Ear: External ear normal.      Left Ear: External ear normal.      Nose: Nose normal.   Eyes:      General: No scleral icterus.        Right eye: No discharge.         Left eye: No discharge.      Conjunctiva/sclera: Conjunctivae normal.   Cardiovascular:      Rate and Rhythm: Normal rate and regular rhythm.      Heart sounds: No murmur heard.  Pulmonary:      Effort: Pulmonary effort is normal. No respiratory distress.      Breath sounds: Normal breath sounds.   Musculoskeletal:         General: No tenderness.      Right lower leg: Edema present.      Left lower leg: Edema present.   Skin:     General: Skin is warm and dry.      Findings: No erythema.   Neurological:      General: No focal deficit present.      Mental Status: She is alert and oriented to person, place, and time.      Cranial Nerves: No cranial nerve deficit.   Psychiatric:         Mood and Affect: Mood normal.         Behavior: Behavior normal.       Past Medical History:   Diagnosis Date    Adverse effect of anesthesia     in 2008 \"throat closes up\"\"anxiety\" ?laryngospasm, kept in ICU. Pt states no problems currently 2018.     Anemia     Anesthesia     in 2008 \"throat closes up\"\"anxiety\" ?laryngospasm, kept in ICU. Pt states no problems currently 2018.     Arthritis     right shoulder, hands    Bowel habit changes More frequent    Cataract 2022    Cigarette smoker quit 2013    Dental disorder     missing teeth; Partial plate on top    depression 08/30/2016    denies depression, states has anxiety and panic attacks    Diabetes mellitus type 1 ([facility]) 1989    insulin    Dialysis patient (Formerly [facility]) Short time due to a kidney injury from a infection    Encounter for long-term (current) use of insulin ([facility]) 09/25/2013    Heart burn     " "High cholesterol     Hypertension     Hypothyroidism, postsurgical 1970    Indigestion     Infectious disease      had hepatitis C, tested neg.    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) 06/10/2015     Social History     Socioeconomic History    Marital status:      Spouse name: Not on file    Number of children: Not on file    Years of education: Not on file    Highest education level: Not on file   Occupational History    Not on file   Tobacco Use    Smoking status: Every Day     Current packs/day: 0.50     Average packs/day: 0.5 packs/day for 30.0 years (15.0 ttl pk-yrs)     Types: Cigarettes    Smokeless tobacco: Never   Vaping Use    Vaping Use: Never used   Substance and Sexual Activity    Alcohol use: Not Currently    Drug use: Yes     Types: Inhaled, Oral, Marijuana     Comment: Marijuana - Occasional; edibles    Sexual activity: Not Currently   Other Topics Concern    Not on file   Social History Narrative    Not on file     Social Determinants of Health     Financial Resource Strain: Low Risk  (11/1/2021)    Overall Financial Resource Strain (CARDIA)     Difficulty of Paying Living Expenses: Not very hard   Food Insecurity: No Food Insecurity (11/1/2021)    Hunger Vital Sign     Worried About Running Out of Food in the Last Year: Never true     Ran Out of Food in the Last Year: Never true   Transportation Needs: No Transportation Needs (11/1/2021)    PRAPARE - Transportation     Lack of Transportation (Medical): No     Lack of Transportation (Non-Medical): No   Physical Activity: Inactive (6/23/2020)    Exercise Vital Sign     Days of Exercise per Week: 0 days     Minutes of Exercise per Session: 0 min "   Stress: No Stress Concern Present (6/23/2020)    [employer] - Occupational Stress Questionnaire     Feeling of Stress : Not at all   Social Connections: Moderately Isolated (6/23/2020)    Social Connection and Isolation Panel [NHANES]     Frequency of Communication with Friends and Family: More than three times a week     Frequency of Social Gatherings with Friends and Family: More than three times a week     Attends Anabaptism Services: Never     Active Member of Clubs or Organizations: No     Attends Club or Organization Meetings: Never     Marital Status:    Intimate Partner Violence: Not At Risk (6/23/2020)    Humiliation, Afraid, Rape, and Kick questionnaire     Fear of Current or Ex-Partner: No     Emotionally Abused: No     Physically Abused: No     Sexually Abused: No   Housing Stability: Not on file     Family History   Problem Relation Age of Onset    Hypertension Mother     Cancer Father      Recent Labs     11/19/22  1028 01/30/23  1533 02/21/23  1550 03/03/23  1017 03/09/23  1147 07/17/23  1529 07/18/23  2016 08/14/23  1316 08/17/23  0850   ALBUMIN 3.8  --    < > 3.7   < > 2.7* 2.2*  --  3.2     --   --  83  --   --   --   --  145   TRIGLYCERIDE 93  --   --  186*  --   --   --   --  52   SODIUM 140  --    < > 137   < >  --  137 135 139   POTASSIUM 4.4  --    < > 4.3   < >  --  4.1 4.4 4.3   CHLORIDE 103  --    < > 103   < >  --  105 101 103   CO2 23  --    < > 21   < >  --  20 25 25   BUN 28*  --    < > 26*   < >  --  20 26* 25*   CREATININE 1.07  --    < > 1.31   < >  --  1.33 1.46* 1.43*   PHOSPHORUS  --  3.8  --  4.1  --   --   --   --   --     < > = values in this interval not displayed.                             Assessment & Plan        1. Dependent edema  Patient was advised to be on low-sodium diet  Start furosemide 40 mg twice daily, patient was advised about the potential side effects  Optimize thyroid disease management  2. Essential  hypertension  Uncontrolled  Low-sodium diet  Start furosemide  Optimize thyroid disease management    3. Stage 3a chronic kidney disease (HCC)  Most likely diabetic nephropathy  No uremic symptoms  Renal dose of medication  Avoid nephrotoxins  Continue same medication regimen  Patient was advised to call us if symptoms worsen      4. Hypothyroidism, unspecified type  Optimize thyroid disease management  Patient scheduled to see her endocrinologist this coming week    5. Tobacco abuse  I spent 3 minutes discussing the need for smoking/tobacco cessation. We discussed measures for quitting including replacements such as nicotine gum/nicotine patch       6.  Proteinuria  Most like secondary diabetic nephropathy  Might consider a kidney biopsy however her poorly controlled diabetes will be a concern for potential treatment of GN  Start ACE inhibitor if blood pressure will tolerate(after optimizing intravascular volume)

## 2023-08-25 ENCOUNTER — HOSPITAL ENCOUNTER (OUTPATIENT)
Dept: RADIOLOGY | Facility: MEDICAL CENTER | Age: 68
End: 2023-08-25
Attending: INTERNAL MEDICINE
Payer: MEDICARE

## 2023-08-25 DIAGNOSIS — R11.2 NAUSEA AND VOMITING, UNSPECIFIED VOMITING TYPE: ICD-10-CM

## 2023-08-25 PROCEDURE — A9541 TC99M SULFUR COLLOID: HCPCS

## 2023-08-28 ENCOUNTER — TELEPHONE (OUTPATIENT)
Dept: PHARMACY | Facility: MEDICAL CENTER | Age: 68
End: 2023-08-28

## 2023-08-28 ENCOUNTER — NON-PROVIDER VISIT (OUTPATIENT)
Dept: ENDOCRINOLOGY | Facility: MEDICAL CENTER | Age: 68
End: 2023-08-28
Attending: INTERNAL MEDICINE
Payer: MEDICARE

## 2023-08-28 VITALS
BODY MASS INDEX: 20.73 KG/M2 | OXYGEN SATURATION: 99 % | SYSTOLIC BLOOD PRESSURE: 118 MMHG | HEART RATE: 84 BPM | HEIGHT: 66 IN | WEIGHT: 129 LBS | DIASTOLIC BLOOD PRESSURE: 70 MMHG

## 2023-08-28 DIAGNOSIS — E10.69 TYPE 1 DIABETES MELLITUS WITH OTHER SPECIFIED COMPLICATION (HCC): ICD-10-CM

## 2023-08-28 DIAGNOSIS — E10.9 TYPE 1 DIABETES MELLITUS ON INSULIN THERAPY (HCC): ICD-10-CM

## 2023-08-28 DIAGNOSIS — E89.0 HYPOTHYROIDISM, POSTSURGICAL: ICD-10-CM

## 2023-08-28 LAB
HBA1C MFR BLD: 8.3 % (ref ?–5.8)
POCT INT CON NEG: NEGATIVE
POCT INT CON POS: POSITIVE

## 2023-08-28 PROCEDURE — 99213 OFFICE O/P EST LOW 20 MIN: CPT | Performed by: INTERNAL MEDICINE

## 2023-08-28 PROCEDURE — 83036 HEMOGLOBIN GLYCOSYLATED A1C: CPT

## 2023-08-28 RX ORDER — INSULIN ASPART INJECTION 100 [IU]/ML
10 INJECTION, SOLUTION SUBCUTANEOUS
Qty: 30 ML | Refills: 3 | Status: SHIPPED | OUTPATIENT
Start: 2023-08-28 | End: 2024-02-28

## 2023-08-28 RX ORDER — LEVOTHYROXINE SODIUM 125 MCG
250 TABLET ORAL
Qty: 60 TABLET | Refills: 6 | Status: SHIPPED | OUTPATIENT
Start: 2023-08-28 | End: 2024-03-20

## 2023-08-28 RX ORDER — INSULIN LISPRO-AABC 100 [IU]/ML
10 INJECTION, SOLUTION SUBCUTANEOUS
Qty: 30 ML | Refills: 3 | Status: SHIPPED | OUTPATIENT
Start: 2023-08-28 | End: 2023-08-28 | Stop reason: CLARIF

## 2023-08-28 RX ORDER — BLOOD-GLUCOSE SENSOR
1 EACH MISCELLANEOUS
Qty: 6 EACH | Refills: 3 | Status: SHIPPED | OUTPATIENT
Start: 2023-08-28 | End: 2024-03-20

## 2023-08-28 ASSESSMENT — FIBROSIS 4 INDEX: FIB4 SCORE: 1.35

## 2023-08-28 NOTE — PROGRESS NOTES
RN-CDE Note    Subjective:   Endocrinology Clinic Progress Note  PCP: Jarrell Yates M.D.    HPI:  Anu Manuel is a 68 y.o. old patient who is seen today by the Diabetes Nurse Specialist for review of Type 1 Diabetes.  Recent changes in health: She can not do the transmitter.  She states she gained 20 pounds of fluid.  Her TSH went up to 73.100 after being switched form Synthroid name brand to Levothyroxine.  She will need to go back to name brand Synthroid.   DM:   Last A1c:   Lab Results   Component Value Date/Time    HBA1C 8.3 (A) 08/28/2023 10:27 AM      Previous A1c was 9.9 on 3/3/23  A1C GOAL: < 7    Diabetes Medications:   Toujeo 22 units daily  Novolog taking after the meal 5-10 units    Exercise: Having a lot pain and having a hard time breathing with edema  Diet: Eggs, toast, and jelly.  Snacks throughout the day on english muffin.  Dinner is protein, vegetable/salad, and carbohydrates.  Patient's body mass index is unknown because there is no height or weight on file. Exercise and nutrition counseling were performed at this visit.    Glucose monitoring frequency: Had a hard time using the Dexcom 6 getting the transmitter in.  She may benefit using the Dexcom 7.    Hypoglycemic episodes: yes - After taking too much Novolog  Last Retinal Exam:  She had cataract surgery and now has problems with floaters and other eye issues that she needs to see a specialist for.  Daily Foot Exam: Yes   Foot Exam:  Monofilament: done  Monofilament testing with a 10 gram force: sensation intact: intact bilaterally  Visual Inspection: Feet with maceration, ulcers, fissures.  Plus 1-2 edema of lower extremities Left> right.  Pedal pulses: intact bilaterally   Lab Results   Component Value Date/Time    MALBCRT 5347 (H) 05/09/2023 01:16 PM    MICROALBUR 390.9 05/09/2023 01:16 PM        ACR Albumin/Creatinine Ratio goal <30     HTN:   Blood pressure goal <130/<80 .   Currently Rx ACE/ARB: No     Dyslipidemia:    Lab  Results   Component Value Date/Time    CHOLSTRLTOT 232 (H) 08/17/2023 08:50 AM    LDL 77 08/17/2023 08:50 AM     08/17/2023 08:50 AM    TRIGLYCERIDE 52 08/17/2023 08:50 AM         Currently Rx Statin: Yes     She  reports that she has been smoking cigarettes. She has a 15.0 pack-year smoking history. She has never used smokeless tobacco.      Plan:     Discussed and educated on:   - All medications, side effects and compliance (discussed carefully)  - Annual eye examinations at Ophthalmology  - Home glucose monitoring emphasized  - Weight control and daily exercise    Recommended medication changes: She will need to s  witch back to the [employer] system since she is not physically able to do the Dexcom with her arthritis.  She also needs a faster acting insulin.  Du was sampled but her insurance will cover FIASP so we will change her to FIASP instead.  Her TSH went up to 73.10 after switching from name brand Synthroid to Levothyroxine so we will switch her back to name brand only.  She is taking it appropriately and waiting 1 hour before eating or drinking anything.  She will adjust her insulin as needed to get her blood sugars back in the  range.  She will follow up with Jakcson Copeland in December and do her thyroid labs before her appointment or earlier if needed.

## 2023-08-28 NOTE — TELEPHONE ENCOUNTER
High priority Prior Authorization request via liaison for FreeStyle John 3 Sensor #6 for 84 days    Submitted PA in CMM-Key#[id number]     Diagnosis-E10.9 - Type 1 diabetes mellitus without complications    Tried and Failed Medications-Dexcom    Attached chart notes and answered clinical questions.    Waiting on Plan Response

## 2023-08-29 NOTE — TELEPHONE ENCOUNTER
High priority Prior Authorization request via liaison for FreeStyle John 3 Sensor #6 for 84 days has DENIED    Insurance- [facility]    Reference#-? [id number]    This request has not been approved. We were asked to cover the drug or product listed at the top of this letter under your Medicare Part D benefit (your pharmacy benefit). Medicare law does not allow certain drugs or classes of drugs to be covered under your pharmacy benefit.    Medical Prior Authorization through Medicare A and B forwarding to Liaison to let medical staff

## 2023-09-06 ENCOUNTER — TELEPHONE (OUTPATIENT)
Dept: HEALTH INFORMATION MANAGEMENT | Facility: OTHER | Age: 68
End: 2023-09-06

## 2023-09-14 DIAGNOSIS — R60.9 DEPENDENT EDEMA: ICD-10-CM

## 2023-09-14 RX ORDER — FUROSEMIDE 40 MG/1
40 TABLET ORAL 2 TIMES DAILY
Qty: 60 TABLET | Refills: 11 | Status: SHIPPED | OUTPATIENT
Start: 2023-09-14 | End: 2024-02-14 | Stop reason: SDUPTHER

## 2023-09-18 ENCOUNTER — HOSPITAL ENCOUNTER (OUTPATIENT)
Dept: LAB | Facility: MEDICAL CENTER | Age: 68
End: 2023-09-18
Attending: INTERNAL MEDICINE
Payer: MEDICARE

## 2023-09-18 ENCOUNTER — HOSPITAL ENCOUNTER (OUTPATIENT)
Dept: LAB | Facility: MEDICAL CENTER | Age: 68
End: 2023-09-18
Attending: UROLOGY
Payer: MEDICARE

## 2023-09-18 DIAGNOSIS — N18.31 STAGE 3A CHRONIC KIDNEY DISEASE: ICD-10-CM

## 2023-09-18 DIAGNOSIS — I10 ESSENTIAL HYPERTENSION: ICD-10-CM

## 2023-09-18 DIAGNOSIS — E03.9 HYPOTHYROIDISM, UNSPECIFIED TYPE: ICD-10-CM

## 2023-09-18 DIAGNOSIS — E89.0 HYPOTHYROIDISM, POSTSURGICAL: ICD-10-CM

## 2023-09-18 DIAGNOSIS — R60.9 DEPENDENT EDEMA: ICD-10-CM

## 2023-09-18 LAB
ERYTHROCYTE [DISTWIDTH] IN BLOOD BY AUTOMATED COUNT: 51.1 FL (ref 35.9–50)
HCT VFR BLD AUTO: 33.9 % (ref 37–47)
HGB BLD-MCNC: 10.7 G/DL (ref 12–16)
MCH RBC QN AUTO: 30.3 PG (ref 27–33)
MCHC RBC AUTO-ENTMCNC: 31.6 G/DL (ref 32.2–35.5)
MCV RBC AUTO: 96 FL (ref 81.4–97.8)
PLATELET # BLD AUTO: 308 K/UL (ref 164–446)
PMV BLD AUTO: 10.4 FL (ref 9–12.9)
RBC # BLD AUTO: 3.53 M/UL (ref 4.2–5.4)
WBC # BLD AUTO: 7.5 K/UL (ref 4.8–10.8)

## 2023-09-18 PROCEDURE — 85027 COMPLETE CBC AUTOMATED: CPT

## 2023-09-18 PROCEDURE — 84443 ASSAY THYROID STIM HORMONE: CPT

## 2023-09-18 PROCEDURE — 82043 UR ALBUMIN QUANTITATIVE: CPT

## 2023-09-18 PROCEDURE — 82570 ASSAY OF URINE CREATININE: CPT

## 2023-09-18 PROCEDURE — 80048 BASIC METABOLIC PNL TOTAL CA: CPT

## 2023-09-18 PROCEDURE — 36415 COLL VENOUS BLD VENIPUNCTURE: CPT

## 2023-09-18 PROCEDURE — 84439 ASSAY OF FREE THYROXINE: CPT

## 2023-09-18 PROCEDURE — 84481 FREE ASSAY (FT-3): CPT

## 2023-09-19 ENCOUNTER — TELEPHONE (OUTPATIENT)
Dept: CARDIOLOGY | Facility: MEDICAL CENTER | Age: 68
End: 2023-09-19
Payer: MEDICARE

## 2023-09-19 LAB
ANION GAP SERPL CALC-SCNC: 12 MMOL/L (ref 7–16)
BUN SERPL-MCNC: 45 MG/DL (ref 8–22)
CALCIUM SERPL-MCNC: 8.5 MG/DL (ref 8.5–10.5)
CHLORIDE SERPL-SCNC: 102 MMOL/L (ref 96–112)
CO2 SERPL-SCNC: 20 MMOL/L (ref 20–33)
CREAT SERPL-MCNC: 1.8 MG/DL (ref 0.5–1.4)
CREAT UR-MCNC: 39.59 MG/DL
GFR SERPLBLD CREATININE-BSD FMLA CKD-EPI: 31 ML/MIN/1.73 M 2
GLUCOSE SERPL-MCNC: 231 MG/DL (ref 65–99)
MICROALBUMIN UR-MCNC: 81.3 MG/DL
MICROALBUMIN/CREAT UR: 2054 MG/G (ref 0–30)
POTASSIUM SERPL-SCNC: 4.9 MMOL/L (ref 3.6–5.5)
SODIUM SERPL-SCNC: 134 MMOL/L (ref 135–145)
T3FREE SERPL-MCNC: 1.88 PG/ML (ref 2–4.4)
T4 FREE SERPL-MCNC: 1.11 NG/DL (ref 0.93–1.7)
TSH SERPL DL<=0.005 MIU/L-ACNC: 14.2 UIU/ML (ref 0.38–5.33)

## 2023-09-19 NOTE — TELEPHONE ENCOUNTER
[facility]    Caller: Anu Manuel    Topic/issue: MEDICAL ADVICE    Asha states that she went to get her blood drawn yesterday for a different provider, thinking that she was drawing blood for [facility]. She would like to know if [facility] can use these results and if there are any additional testing that [facility] would like she is ready to get it done today. She would just need to be informed of the pending orders. Please advise.    Thank you,  Aj STATON    Callback Number: [phone] (home)

## 2023-09-21 ENCOUNTER — OFFICE VISIT (OUTPATIENT)
Dept: CARDIOLOGY | Facility: MEDICAL CENTER | Age: 68
End: 2023-09-21
Attending: INTERNAL MEDICINE
Payer: MEDICARE

## 2023-09-21 VITALS
DIASTOLIC BLOOD PRESSURE: 68 MMHG | OXYGEN SATURATION: 100 % | BODY MASS INDEX: 20.09 KG/M2 | HEART RATE: 74 BPM | WEIGHT: 125 LBS | RESPIRATION RATE: 16 BRPM | SYSTOLIC BLOOD PRESSURE: 142 MMHG | HEIGHT: 66 IN

## 2023-09-21 DIAGNOSIS — E89.0 HYPOTHYROIDISM, POSTSURGICAL: ICD-10-CM

## 2023-09-21 DIAGNOSIS — E78.5 DYSLIPIDEMIA: ICD-10-CM

## 2023-09-21 DIAGNOSIS — I25.10 CAD S/P PERCUTANEOUS CORONARY ANGIOPLASTY: ICD-10-CM

## 2023-09-21 DIAGNOSIS — I10 HYPERTENSION, ESSENTIAL: ICD-10-CM

## 2023-09-21 DIAGNOSIS — Z98.61 CAD S/P PERCUTANEOUS CORONARY ANGIOPLASTY: ICD-10-CM

## 2023-09-21 DIAGNOSIS — E10.9 TYPE 1 DIABETES MELLITUS ON INSULIN THERAPY (HCC): Chronic | ICD-10-CM

## 2023-09-21 DIAGNOSIS — D69.2 SENILE PURPURA (HCC): ICD-10-CM

## 2023-09-21 DIAGNOSIS — F17.210 CIGARETTE SMOKER: ICD-10-CM

## 2023-09-21 DIAGNOSIS — N18.32 STAGE 3B CHRONIC KIDNEY DISEASE: ICD-10-CM

## 2023-09-21 PROCEDURE — 99214 OFFICE O/P EST MOD 30 MIN: CPT | Performed by: INTERNAL MEDICINE

## 2023-09-21 PROCEDURE — 3078F DIAST BP <80 MM HG: CPT | Performed by: INTERNAL MEDICINE

## 2023-09-21 PROCEDURE — 99213 OFFICE O/P EST LOW 20 MIN: CPT | Performed by: INTERNAL MEDICINE

## 2023-09-21 PROCEDURE — 3077F SYST BP >= 140 MM HG: CPT | Performed by: INTERNAL MEDICINE

## 2023-09-21 RX ORDER — AMLODIPINE BESYLATE 5 MG/1
5 TABLET ORAL DAILY
Qty: 100 TABLET | Refills: 3 | Status: ON HOLD | OUTPATIENT
Start: 2023-09-21 | End: 2024-03-01

## 2023-09-21 RX ORDER — ATORVASTATIN CALCIUM 40 MG/1
40 TABLET, FILM COATED ORAL NIGHTLY
Qty: 100 TABLET | Refills: 3 | Status: SHIPPED | OUTPATIENT
Start: 2023-09-21

## 2023-09-21 ASSESSMENT — FIBROSIS 4 INDEX: FIB4 SCORE: 1.628571428571428571

## 2023-09-21 NOTE — PROGRESS NOTES
"CARDIOLOGY OUTPATIENT FOLLOWUP    PCP: Jarrell Yates M.D.    1. CAD S/P percutaneous coronary angioplasty    2. Cigarette smoker    3. Type 1 diabetes mellitus on insulin therapy (HCC)    4. Senile purpura (HCC)    5. Stage 3b chronic kidney disease (HCC)    6. Dyslipidemia    7. Hypertension, essential        Anu Manuel has uncontrolled blood pressure and cholesterol and ongoing smoking.  I advised adding amlodipine, replacing pravastatin with atorvastatin and will update labs in November.  I also counseled her for 5 minutes on smoking cessation including the use of nicotine replacement products as well as pharmacotherapy.  She is not immediately ready but I will check in with her in 1 month to see if she wishes to make the change.  I will also have her do a renal duplex    Follow up: 1 year    History: Anu Manuel is a 68 y.o. female with history of type 1 diabetes, tobacco use presenting for follow-up of coronary artery disease with PCI in 2020 for unstable angina.  LVEF is normal.  She also has a history of liver disease and CKD 3B.    She developed diffuse body edema and has mostly resolved since implementation of furosemide.  She also has had an increase in the blood pressure.  She reports a change in thyroid function after she was given the generic for Synthroid.    When I saw her last year she had a carotid duplex and DAYANARA.  DAYANARA showed noncompressible calcified below the knee vessels but patent circulation through the thigh.  There was not obstructive carotid disease.      Physical Exam:  BP (!) 142/68 (BP Location: Left arm, Patient Position: Sitting, BP Cuff Size: Adult)   Pulse 74   Resp 16   Ht 1.676 m (5' 6\")   Wt 56.7 kg (125 lb)   LMP 04/29/2005 (LMP Unknown)   SpO2 100%   BMI 20.18 kg/m²   GEN: NAD  RESP: CTAB  CVS: RRR, No M/R/G  ABD: Soft, NT/ND  EXT: WWP, no edema    The ASCVD Risk score (Wayne DK, et al., 2019) failed to calculate.         Studies  Lab Results " "  Component Value Date/Time    CHOLSTRLTOT 232 (H) 08/17/2023 08:50 AM    LDL 77 08/17/2023 08:50 AM     08/17/2023 08:50 AM    TRIGLYCERIDE 52 08/17/2023 08:50 AM       Lab Results   Component Value Date/Time    SODIUM 134 (L) 09/18/2023 02:47 PM    POTASSIUM 4.9 09/18/2023 02:47 PM    CHLORIDE 102 09/18/2023 02:47 PM    CO2 20 09/18/2023 02:47 PM    GLUCOSE 231 (H) 09/18/2023 02:47 PM    BUN 45 (H) 09/18/2023 02:47 PM    CREATININE 1.80 (H) 09/18/2023 02:47 PM    CREATININE 1.0 01/08/2009 04:31 PM     Lab Results   Component Value Date/Time    ALKPHOSPHAT 773 (H) 08/17/2023 08:50 AM    ASTSGOT 38 08/17/2023 08:50 AM    ALTSGPT 25 08/17/2023 08:50 AM    TBILIRUBIN 0.6 08/17/2023 08:50 AM        Past Medical History:   Diagnosis Date    Adverse effect of anesthesia     in 2008 \"throat closes up\"\"anxiety\" ?laryngospasm, kept in ICU. Pt states no problems currently 2018.     Anemia     Anesthesia     in 2008 \"throat closes up\"\"anxiety\" ?laryngospasm, kept in ICU. Pt states no problems currently 2018.     Arthritis     right shoulder, hands    Bowel habit changes More frequent    Cataract 2022    Cigarette smoker quit 2013    Dental disorder     missing teeth; Partial plate on top    depression 08/30/2016    denies depression, states has anxiety and panic attacks    Diabetes mellitus type 1 (HCC) 1989    insulin    Dialysis patient (HCC) Short time due to a kidney injury from a infection    Encounter for long-term (current) use of insulin (HCC) 09/25/2013    Heart burn     High cholesterol     Hypertension     Hypothyroidism, postsurgical 1970    Indigestion     Infectious disease      had hepatitis C, tested neg.    Jaundice 2021 Liver disease    Joint replacement     cervical    Liver disease     Liver failure (HCC)     Pain     \"fibromyalgia\";lower back, right leg    Polyneuropathy in diabetes(357.2) 06/10/2015    Status post appendectomy     Type I (juvenile type) diabetes mellitus with neurological " manifestations, uncontrolled(250.63) 06/10/2015     Allergies   Allergen Reactions    Tape Unspecified and Rash     Blisters, paper tape is ok  Other reaction(s): Rash     Outpatient Encounter Medications as of 9/21/2023   Medication Sig Dispense Refill    furosemide (LASIX) 40 MG Tab TAKE 1 TABLET BY MOUTH TWICE A DAY 60 Tablet 11    SYNTHROID 125 MCG Tab Take 2 Tablets by mouth every day. Name brand medically necessare 60 Tablet 6    Continuous Blood Gluc Sensor (FREESTYLE PARISA 3 SENSOR) Misc 1 Each every 14 days. 6 Each 3    Insulin Aspart, w/Niacinamide, (FIASP FLEXTOUCH) 100 UNIT/ML Solution Pen-injector Inject 10 Units under the skin 3 times a day before meals. 30 mL 3    fenofibrate (TRICOR) 48 MG Tab TAKE 1 TABLET BY MOUTH EVERY MORNING AFTER A MEAL      ondansetron (ZOFRAN ODT) 8 MG TABLET DISPERSIBLE 1 TABLET ORAL EVERY 8 HOURS FOR NAUSEA/VOMITING      omeprazole (PRILOSEC) 40 MG delayed-release capsule Take 40 mg by mouth every day.      pravastatin (PRAVACHOL) 40 MG tablet TAKE 1 TABLET BY MOUTH EVERY DAY IN THE EVENING 100 Tablet 0    famotidine (PEPCID) 20 MG Tab TAKE 1 TABLET BY MOUTH TWICE A DAY 60 Tablet 2    potassium chloride (MICRO-K) 10 MEQ capsule Take 1 Capsule by mouth every morning.      BD PEN NEEDLE SOFIA U/F For use with 2-3 daily insulin 400 Each 3    Insulin Glargine, 1 Unit Dial, (TOUJEO SOLOSTAR) 300 UNIT/ML Solution Pen-injector Inject 45 Units under the skin at bedtime. 13.5 mL 3    Continuous Blood Gluc Transmit (DEXCOM G6 TRANSMITTER) Misc Change every 3 months 1 Each 3    insulin aspart (NOVOLOG FLEXPEN) 100 UNIT/ML injection PEN Inject 15 Units under the skin 3 times a day before meals. 45 mL 3    riFAMPin (RIFADINE) 300 MG Cap Take 300 Capsules by mouth 2 times a day.      oxyCODONE-acetaminophen (PERCOCET) 7.5-325 MG per tablet Take  by mouth as needed.      ursodiol (ACTIGALL) 300 MG Cap Take 300 mg by mouth 3 times a day.      Vitamin D, Ergocalciferol, 50 MCG (2000 UT) Cap  Take 5,000 Units by mouth every day.      metoprolol SR (TOPROL XL) 50 MG TABLET SR 24 HR 50 mg every morning.      metoprolol SR (TOPROL XL) 100 MG TABLET SR 24  mg every morning.      ezetimibe (ZETIA) 10 MG Tab 10 mg every evening.      traZODone (DESYREL) 100 MG Tab Take 100 mg by mouth at bedtime.       No facility-administered encounter medications on file as of 9/21/2023.     Social History     Socioeconomic History    Marital status:      Spouse name: Not on file    Number of children: Not on file    Years of education: Not on file    Highest education level: Not on file   Occupational History    Not on file   Tobacco Use    Smoking status: Every Day     Current packs/day: 0.50     Average packs/day: 0.5 packs/day for 30.0 years (15.0 ttl pk-yrs)     Types: Cigarettes    Smokeless tobacco: Never   Vaping Use    Vaping Use: Never used   Substance and Sexual Activity    Alcohol use: Not Currently    Drug use: Yes     Types: Inhaled, Oral, Marijuana     Comment: Marijuana - Occasional; edibles    Sexual activity: Not Currently   Other Topics Concern    Not on file   Social History Narrative    Not on file     Social Determinants of Health     Financial Resource Strain: Low Risk  (11/1/2021)    Overall Financial Resource Strain (CARDIA)     Difficulty of Paying Living Expenses: Not very hard   Food Insecurity: No Food Insecurity (11/1/2021)    Hunger Vital Sign     Worried About Running Out of Food in the Last Year: Never true     Ran Out of Food in the Last Year: Never true   Transportation Needs: No Transportation Needs (11/1/2021)    PRAPARE - Transportation     Lack of Transportation (Medical): No     Lack of Transportation (Non-Medical): No   Physical Activity: Inactive (6/23/2020)    Exercise Vital Sign     Days of Exercise per Week: 0 days     Minutes of Exercise per Session: 0 min   Stress: No Stress Concern Present (6/23/2020)    [employer] - Occupational  Stress Questionnaire     Feeling of Stress : Not at all   Social Connections: Moderately Isolated (6/23/2020)    Social Connection and Isolation Panel [NHANES]     Frequency of Communication with Friends and Family: More than three times a week     Frequency of Social Gatherings with Friends and Family: More than three times a week     Attends Oriental orthodox Services: Never     Active Member of Clubs or Organizations: No     Attends Club or Organization Meetings: Never     Marital Status:    Intimate Partner Violence: Not At Risk (6/23/2020)    Humiliation, Afraid, Rape, and Kick questionnaire     Fear of Current or Ex-Partner: No     Emotionally Abused: No     Physically Abused: No     Sexually Abused: No   Housing Stability: Not on file         ROS:   10 point review systems is otherwise negative except as per the HPI    No chief complaint on file.

## 2023-09-26 ENCOUNTER — OFFICE VISIT (OUTPATIENT)
Dept: NEPHROLOGY | Facility: MEDICAL CENTER | Age: 68
End: 2023-09-26
Payer: MEDICARE

## 2023-09-26 VITALS
OXYGEN SATURATION: 100 % | BODY MASS INDEX: 20.12 KG/M2 | DIASTOLIC BLOOD PRESSURE: 78 MMHG | WEIGHT: 128.2 LBS | HEART RATE: 73 BPM | HEIGHT: 67 IN | SYSTOLIC BLOOD PRESSURE: 126 MMHG | TEMPERATURE: 97.8 F

## 2023-09-26 DIAGNOSIS — Z72.0 TOBACCO ABUSE: ICD-10-CM

## 2023-09-26 DIAGNOSIS — I10 ESSENTIAL HYPERTENSION: ICD-10-CM

## 2023-09-26 DIAGNOSIS — E03.9 HYPOTHYROIDISM, UNSPECIFIED TYPE: ICD-10-CM

## 2023-09-26 DIAGNOSIS — Z71.6 TOBACCO ABUSE COUNSELING: ICD-10-CM

## 2023-09-26 DIAGNOSIS — R80.9 PROTEINURIA, UNSPECIFIED TYPE: ICD-10-CM

## 2023-09-26 DIAGNOSIS — N18.31 STAGE 3A CHRONIC KIDNEY DISEASE: ICD-10-CM

## 2023-09-26 DIAGNOSIS — R60.9 EDEMA, UNSPECIFIED TYPE: ICD-10-CM

## 2023-09-26 PROCEDURE — 99214 OFFICE O/P EST MOD 30 MIN: CPT | Performed by: INTERNAL MEDICINE

## 2023-09-26 PROCEDURE — 3074F SYST BP LT 130 MM HG: CPT | Performed by: INTERNAL MEDICINE

## 2023-09-26 PROCEDURE — 3078F DIAST BP <80 MM HG: CPT | Performed by: INTERNAL MEDICINE

## 2023-09-26 ASSESSMENT — ENCOUNTER SYMPTOMS
NAUSEA: 0
HYPERTENSION: 1
FEVER: 0
VOMITING: 0
SHORTNESS OF BREATH: 0
COUGH: 0
CHILLS: 0

## 2023-09-26 ASSESSMENT — FIBROSIS 4 INDEX: FIB4 SCORE: 1.628571428571428571

## 2023-09-26 NOTE — PROGRESS NOTES
"Subjective     Asha Manuel is a 68 y.o. female who presents with Hypertension and Chronic Kidney Disease            Hypertension  This is a chronic problem. The current episode started more than 1 year ago. The problem is unchanged. The problem is controlled. Associated symptoms include peripheral edema. Pertinent negatives include no chest pain, malaise/fatigue or shortness of breath. Agents associated with hypertension include thyroid hormones. Risk factors for coronary artery disease include post-menopausal state and diabetes mellitus. Past treatments include calcium channel blockers and diuretics. The current treatment provides significant improvement. There are no compliance problems.  Hypertensive end-organ damage includes kidney disease. Identifiable causes of hypertension include chronic renal disease.   Chronic Kidney Disease  This is a chronic problem. The current episode started more than 1 year ago. The problem occurs constantly. The problem has been unchanged. Pertinent negatives include no chest pain, chills, coughing, fever, nausea, urinary symptoms or vomiting.       Review of Systems   Constitutional:  Negative for chills, fever and malaise/fatigue.   Respiratory:  Negative for cough and shortness of breath.    Cardiovascular:  Negative for chest pain and leg swelling.   Gastrointestinal:  Negative for nausea and vomiting.   Genitourinary:  Negative for dysuria, frequency and urgency.              Objective     /78 (BP Location: Right arm, Patient Position: Sitting, BP Cuff Size: Adult)   Pulse 73   Temp 36.6 °C (97.8 °F) (Temporal)   Ht 1.689 m (5' 6.5\")   Wt 58.2 kg (128 lb 3.2 oz)   LMP 04/29/2005 (LMP Unknown)   SpO2 100%   BMI 20.38 kg/m²      Physical Exam  Vitals and nursing note reviewed.   Constitutional:       General: She is not in acute distress.     Appearance: Normal appearance. She is well-developed. She is not diaphoretic.   HENT:      Head: Normocephalic and " "atraumatic.      Right Ear: External ear normal.      Left Ear: External ear normal.      Nose: Nose normal.   Eyes:      General: No scleral icterus.        Right eye: No discharge.         Left eye: No discharge.      Conjunctiva/sclera: Conjunctivae normal.   Cardiovascular:      Rate and Rhythm: Normal rate and regular rhythm.      Heart sounds: No murmur heard.  Pulmonary:      Effort: Pulmonary effort is normal. No respiratory distress.      Breath sounds: Normal breath sounds.   Musculoskeletal:         General: No tenderness.      Right lower leg: No edema.      Left lower leg: No edema.   Skin:     General: Skin is warm and dry.      Findings: No erythema.   Neurological:      General: No focal deficit present.      Mental Status: She is alert and oriented to person, place, and time.      Cranial Nerves: No cranial nerve deficit.   Psychiatric:         Mood and Affect: Mood normal.         Behavior: Behavior normal.       Past Medical History:   Diagnosis Date    Adverse effect of anesthesia     in 2008 \"throat closes up\"\"anxiety\" ?laryngospasm, kept in ICU. Pt states no problems currently 2018.     Anemia     Anesthesia     in 2008 \"throat closes up\"\"anxiety\" ?laryngospasm, kept in ICU. Pt states no problems currently 2018.     Arthritis     right shoulder, hands    Bowel habit changes More frequent    Cataract 2022    Cigarette smoker quit 2013    Dental disorder     missing teeth; Partial plate on top    depression 08/30/2016    denies depression, states has anxiety and panic attacks    Diabetes mellitus type 1 (HCC) 1989    insulin    Dialysis patient (HCC) Short time due to a kidney injury from a infection    Encounter for long-term (current) use of insulin (HCC) 09/25/2013    Heart burn     High cholesterol     Hypertension     Hypothyroidism, postsurgical 1970    Indigestion     Infectious disease      had hepatitis C, tested neg.    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) 06/10/2015     Social History     Socioeconomic History    Marital status:      Spouse name: Not on file    Number of children: Not on file    Years of education: Not on file    Highest education level: Not on file   Occupational History    Not on file   Tobacco Use    Smoking status: Every Day     Current packs/day: 0.50     Average packs/day: 0.5 packs/day for 30.0 years (15.0 ttl pk-yrs)     Types: Cigarettes    Smokeless tobacco: Never   Vaping Use    Vaping Use: Never used   Substance and Sexual Activity    Alcohol use: Not Currently    Drug use: Yes     Types: Inhaled, Oral, Marijuana     Comment: Marijuana - Occasional; edibles    Sexual activity: Not Currently   Other Topics Concern    Not on file   Social History Narrative    Not on file     Social Determinants of Health     Financial Resource Strain: Low Risk  (11/1/2021)    Overall Financial Resource Strain (CARDIA)     Difficulty of Paying Living Expenses: Not very hard   Food Insecurity: No Food Insecurity (11/1/2021)    Hunger Vital Sign     Worried About Running Out of Food in the Last Year: Never true     Ran Out of Food in the Last Year: Never true   Transportation Needs: No Transportation Needs (11/1/2021)    PRAPARE - Transportation     Lack of Transportation (Medical): No     Lack of Transportation (Non-Medical): No   Physical Activity: Inactive (6/23/2020)    Exercise Vital Sign     Days of Exercise per Week: 0 days     Minutes of Exercise per Session: 0 min   Stress: No Stress Concern Present (6/23/2020)    [employer] [place] - Occupational Stress Questionnaire     Feeling of Stress : Not at all   Social Connections: Moderately Isolated " (6/23/2020)    Social Connection and Isolation Panel [NHANES]     Frequency of Communication with Friends and Family: More than three times a week     Frequency of Social Gatherings with Friends and Family: More than three times a week     Attends [employer] Services: Never     Active Member of Clubs or Organizations: No     Attends Club or Organization Meetings: Never     Marital Status:    Intimate Partner Violence: Not At Risk (6/23/2020)    Humiliation, Afraid, Rape, and Kick questionnaire     Fear of Current or Ex-Partner: No     Emotionally Abused: No     Physically Abused: No     Sexually Abused: No   Housing Stability: Not on file     Family History   Problem Relation Age of Onset    Hypertension Mother     Cancer Father      Recent Labs     11/19/22  1028 01/30/23  1533 02/21/23  1550 03/03/23  1017 03/09/23  1147 07/17/23  1529 07/18/23 2016 08/14/23  1316 08/17/23  0850 09/18/23  1447   ALBUMIN 3.8  --    < > 3.7   < > 2.7* 2.2*  --  3.2  --      --   --  83  --   --   --   --  145  --    TRIGLYCERIDE 93  --   --  186*  --   --   --   --  52  --    SODIUM 140  --    < > 137   < >  --  137 135 139 134*   POTASSIUM 4.4  --    < > 4.3   < >  --  4.1 4.4 4.3 4.9   CHLORIDE 103  --    < > 103   < >  --  105 101 103 102   CO2 23  --    < > 21   < >  --  20 25 25 20   BUN 28*  --    < > 26*   < >  --  20 26* 25* 45*   CREATININE 1.07  --    < > 1.31   < >  --  1.33 1.46* 1.43* 1.80*   PHOSPHORUS  --  3.8  --  4.1  --   --   --   --   --   --     < > = values in this interval not displayed.                             Assessment & Plan        1. Essential hypertension  Controlled  Continue same medication regimen  Continue low-sodium diet      2. Stage 3a chronic kidney disease (HCC)  No uremic symptoms  Renal dose of medication  Avoid nephrotoxins  Continue same medication regimen  Patient was advised to call us if symptoms worsen      3. Hypothyroidism, unspecified type  Continue to optimize  thyroid disease management    4. Edema, unspecified type  Improved  Continue low-sodium diet  Continue diuretics    5. Proteinuria, unspecified type  Recommend ACE inhibitor/ARB    6. Tobacco abuse      7. Tobacco abuse counseling  I spent 3 minutes discussing the need for smoking/tobacco cessation. We discussed measures for quitting including replacements such as nicotine gum/nicotine patch

## 2023-10-10 NOTE — PROGRESS NOTES
Surgical Progress Note    Author: Tello Trammell M.D. Date & Time created: 10/26/2021   10:57 AM     Interval Events:  Labs reviewed   ERCP today   Consider cholecystectomy pending results   GI note reviewed and helpful   Review of Systems   Gastrointestinal: Positive for abdominal pain. Negative for nausea and vomiting.   Skin: Positive for itching.   All other systems reviewed and are negative.    Hemodynamics:  Temp (24hrs), Av.6 °C (97.9 °F), Min:36.6 °C (97.8 °F), Max:36.8 °C (98.2 °F)  Temperature: 36.6 °C (97.8 °F)  Pulse  Av.9  Min: 59  Max: 71   Blood Pressure: 144/70     Respiratory:    Respiration: 18, Pulse Oximetry: 95 %        RUL Breath Sounds: Clear, RML Breath Sounds: Diminished, RLL Breath Sounds: Diminished, ARLEN Breath Sounds: Clear, LLL Breath Sounds: Diminished  Neuro:  GCS       Fluids:    Intake/Output Summary (Last 24 hours) at 10/26/2021 1057  Last data filed at 10/26/2021 1050  Gross per 24 hour   Intake 590 ml   Output --   Net 590 ml        Current Diet Order   Procedures   • Diet NPO Restrict to: Sips with Medications     Physical Exam  Vitals and nursing note reviewed. Exam conducted with a chaperone present.   Constitutional:       Appearance: She is not ill-appearing.   HENT:      Head: Normocephalic.   Eyes:      General: Scleral icterus present.      Pupils: Pupils are equal, round, and reactive to light.   Cardiovascular:      Rate and Rhythm: Normal rate and regular rhythm.      Pulses: Normal pulses.   Pulmonary:      Effort: Pulmonary effort is normal.      Breath sounds: Normal breath sounds.   Abdominal:      General: Bowel sounds are normal.      Palpations: Abdomen is soft.      Tenderness: There is abdominal tenderness.   Musculoskeletal:         General: Normal range of motion.   Skin:     General: Skin is warm.      Capillary Refill: Capillary refill takes less than 2 seconds.   Neurological:      General: No focal deficit present.      Mental Status: She is  alert.   Psychiatric:         Mood and Affect: Mood normal.       Labs:  Recent Results (from the past 24 hour(s))   POCT glucose device results    Collection Time: 10/25/21 11:46 AM   Result Value Ref Range    Glucose - Accu-Ck 205 (H) 65 - 99 mg/dL   SARS-COV Antigen MAAME: Collect dry nasal swab    Collection Time: 10/25/21 11:48 AM   Result Value Ref Range    SARS-CoV-2 Source Nasal Swab     SARS-COV ANTIGEN MAAME NotDetected Not-Detected   URINE SODIUM RANDOM    Collection Time: 10/25/21  3:03 PM   Result Value Ref Range    Sodium, Urine -per volume 77 mmol/L   OSMOLALITY URINE    Collection Time: 10/25/21  3:03 PM   Result Value Ref Range    Osmolality Urine 628 300 - 900 mOsm/kg H2O   POCT glucose device results    Collection Time: 10/25/21  4:58 PM   Result Value Ref Range    Glucose - Accu-Ck 222 (H) 65 - 99 mg/dL   POCT glucose device results    Collection Time: 10/26/21  1:00 AM   Result Value Ref Range    Glucose - Accu-Ck 88 65 - 99 mg/dL   CBC with Differential    Collection Time: 10/26/21  3:08 AM   Result Value Ref Range    WBC 10.0 4.8 - 10.8 K/uL    RBC 2.38 (L) 4.20 - 5.40 M/uL    Hemoglobin 8.0 (L) 12.0 - 16.0 g/dL    Hematocrit 24.5 (L) 37.0 - 47.0 %    .9 (H) 81.4 - 97.8 fL    MCH 33.6 (H) 27.0 - 33.0 pg    MCHC 32.7 (L) 33.6 - 35.0 g/dL    RDW 62.4 (H) 35.9 - 50.0 fL    Platelet Count 324 164 - 446 K/uL    MPV 11.5 9.0 - 12.9 fL    Neutrophils-Polys 70.70 44.00 - 72.00 %    Lymphocytes 15.50 (L) 22.00 - 41.00 %    Monocytes 10.30 0.00 - 13.40 %    Eosinophils 2.60 0.00 - 6.90 %    Basophils 0.90 0.00 - 1.80 %    Nucleated RBC 0.00 /100 WBC    Neutrophils (Absolute) 7.07 2.00 - 7.15 K/uL    Lymphs (Absolute) 1.55 1.00 - 4.80 K/uL    Monos (Absolute) 1.03 (H) 0.00 - 0.85 K/uL    Eos (Absolute) 0.26 0.00 - 0.51 K/uL    Baso (Absolute) 0.09 0.00 - 0.12 K/uL    NRBC (Absolute) 0.00 K/uL    Anisocytosis 1+     Macrocytosis 1+    Comp Metabolic Panel (CMP)    Collection Time: 10/26/21  3:08 AM    Result Value Ref Range    Sodium 135 135 - 145 mmol/L    Potassium 3.6 3.6 - 5.5 mmol/L    Chloride 102 96 - 112 mmol/L    Co2 22 20 - 33 mmol/L    Anion Gap 11.0 7.0 - 16.0    Glucose 41 (LL) 65 - 99 mg/dL    Bun 12 8 - 22 mg/dL    Creatinine 0.94 0.50 - 1.40 mg/dL    Calcium 8.4 (L) 8.5 - 10.5 mg/dL    AST(SGOT) 137 (H) 12 - 45 U/L    ALT(SGPT) 91 (H) 2 - 50 U/L    Alkaline Phosphatase 1694 (H) 30 - 99 U/L    Total Bilirubin 8.0 (H) 0.1 - 1.5 mg/dL    Albumin 2.4 (L) 3.2 - 4.9 g/dL    Total Protein 5.9 (L) 6.0 - 8.2 g/dL    Globulin 3.5 1.9 - 3.5 g/dL    A-G Ratio 0.7 g/dL   Prothrombin Time    Collection Time: 10/26/21  3:08 AM   Result Value Ref Range    PT 13.9 12.0 - 14.6 sec    INR 1.11 0.87 - 1.13   ESTIMATED GFR    Collection Time: 10/26/21  3:08 AM   Result Value Ref Range    GFR If African American >60 >60 mL/min/1.73 m 2    GFR If Non African American 60 >60 mL/min/1.73 m 2   DIFFERENTIAL MANUAL    Collection Time: 10/26/21  3:08 AM   Result Value Ref Range    Manual Diff Status PERFORMED    PERIPHERAL SMEAR REVIEW    Collection Time: 10/26/21  3:08 AM   Result Value Ref Range    Peripheral Smear Review see below    PLATELET ESTIMATE    Collection Time: 10/26/21  3:08 AM   Result Value Ref Range    Plt Estimation Normal    MORPHOLOGY    Collection Time: 10/26/21  3:08 AM   Result Value Ref Range    RBC Morphology Present    POCT glucose device results    Collection Time: 10/26/21  4:04 AM   Result Value Ref Range    Glucose - Accu-Ck 28 (LL) 65 - 99 mg/dL   POCT glucose device results    Collection Time: 10/26/21  4:35 AM   Result Value Ref Range    Glucose - Accu-Ck 96 65 - 99 mg/dL   POCT glucose device results    Collection Time: 10/26/21  7:20 AM   Result Value Ref Range    Glucose - Accu-Ck 27 (LL) 65 - 99 mg/dL   POCT glucose device results    Collection Time: 10/26/21  8:06 AM   Result Value Ref Range    Glucose - Accu-Ck 89 65 - 99 mg/dL   POCT glucose device results    Collection Time:  10/26/21  9:24 AM   Result Value Ref Range    Glucose - Accu-Ck 50 (L) 65 - 99 mg/dL   POCT glucose device results    Collection Time: 10/26/21  9:47 AM   Result Value Ref Range    Glucose - Accu-Ck 147 (H) 65 - 99 mg/dL   POCT glucose device results    Collection Time: 10/26/21 10:15 AM   Result Value Ref Range    Glucose - Accu-Ck 114 (H) 65 - 99 mg/dL     Medical Decision Making, by Problem:  Active Hospital Problems    Diagnosis    • Hypertension [I10]      Priority: Medium   • Cholecystitis [K81.9]    • Hyperbilirubinemia [E80.6]    • Hyponatremia [E87.1]    • Anemia [D64.9]    • CAD S/P percutaneous coronary angioplasty [I25.10, Z98.61]    • Uncontrolled type 1 diabetes mellitus (HCC) [E10.65]      ICD-10 transition       Plan:  Await ercp   Pending surgical decision   30 minutes     Quality Measures:  Quality-Core Measures   Jacob catheter::  No Jacob  DVT prophylaxis pharmacological::  Contraindicated - High bleeding risk  Antibiotics:  Treating active infection/contamination beyond 24 hours perioperative coverage      Discussed patient condition with Patient   Negative

## 2023-10-20 ENCOUNTER — OFFICE VISIT (OUTPATIENT)
Dept: URGENT CARE | Facility: CLINIC | Age: 68
End: 2023-10-20

## 2023-10-20 ENCOUNTER — HOSPITAL ENCOUNTER (OUTPATIENT)
Facility: MEDICAL CENTER | Age: 68
End: 2023-10-20
Attending: PHYSICIAN ASSISTANT
Payer: MEDICARE

## 2023-10-20 VITALS
DIASTOLIC BLOOD PRESSURE: 82 MMHG | OXYGEN SATURATION: 98 % | TEMPERATURE: 97.4 F | RESPIRATION RATE: 16 BRPM | WEIGHT: 131 LBS | SYSTOLIC BLOOD PRESSURE: 102 MMHG | HEIGHT: 66 IN | BODY MASS INDEX: 21.05 KG/M2 | HEART RATE: 82 BPM

## 2023-10-20 DIAGNOSIS — N30.01 ACUTE CYSTITIS WITH HEMATURIA: ICD-10-CM

## 2023-10-20 DIAGNOSIS — R30.0 DYSURIA: ICD-10-CM

## 2023-10-20 LAB
APPEARANCE UR: NORMAL
BILIRUB UR STRIP-MCNC: NORMAL MG/DL
COLOR UR AUTO: NORMAL
GLUCOSE UR STRIP.AUTO-MCNC: NORMAL MG/DL
KETONES UR STRIP.AUTO-MCNC: NORMAL MG/DL
LEUKOCYTE ESTERASE UR QL STRIP.AUTO: NEGATIVE
NITRITE UR QL STRIP.AUTO: NEGATIVE
PH UR STRIP.AUTO: 6 [PH] (ref 5–8)
PROT UR QL STRIP: NORMAL MG/DL
RBC UR QL AUTO: NORMAL
SP GR UR STRIP.AUTO: >=1.03
UROBILINOGEN UR STRIP-MCNC: NORMAL MG/DL

## 2023-10-20 PROCEDURE — 3079F DIAST BP 80-89 MM HG: CPT | Performed by: PHYSICIAN ASSISTANT

## 2023-10-20 PROCEDURE — 87086 URINE CULTURE/COLONY COUNT: CPT

## 2023-10-20 PROCEDURE — 81002 URINALYSIS NONAUTO W/O SCOPE: CPT | Performed by: PHYSICIAN ASSISTANT

## 2023-10-20 PROCEDURE — 99214 OFFICE O/P EST MOD 30 MIN: CPT | Performed by: PHYSICIAN ASSISTANT

## 2023-10-20 PROCEDURE — 3074F SYST BP LT 130 MM HG: CPT | Performed by: PHYSICIAN ASSISTANT

## 2023-10-20 RX ORDER — CEFDINIR 300 MG/1
300 CAPSULE ORAL EVERY 12 HOURS
Qty: 10 CAPSULE | Refills: 0 | Status: SHIPPED | OUTPATIENT
Start: 2023-10-20 | End: 2023-10-25

## 2023-10-20 ASSESSMENT — ENCOUNTER SYMPTOMS
NAUSEA: 1
CHILLS: 0
VOMITING: 0
FLANK PAIN: 0

## 2023-10-20 ASSESSMENT — FIBROSIS 4 INDEX: FIB4 SCORE: 1.628571428571428571

## 2023-10-20 NOTE — PROGRESS NOTES
Subjective     Asha Manuel is a very pleasant 68 y.o. female who presents with UTI (Burning in stomach, lower back pain that radiates from rib cage down to lower back, frequent urination X 3 days )                Dysuria   This is a new problem. The current episode started in the past 7 days. The problem occurs every urination. The problem has been unchanged. The quality of the pain is described as burning. The pain is mild. There is No history of pyelonephritis. Associated symptoms include frequency, hematuria, hesitancy, nausea and urgency. Pertinent negatives include no chills, flank pain or vomiting. She has tried nothing for the symptoms. The treatment provided no relief. There is no history of recurrent UTIs.       PMH:  has a past medical history of Adverse effect of anesthesia, Anemia, Anesthesia, Arthritis, Bowel habit changes (More frequent), Cataract (2022), Cigarette smoker (quit 2013), Dental disorder, depression (08/30/2016), Diabetes mellitus type 1 ([facility]) (1989), Dialysis patient ([facility]) (Short time due to a kidney injury from a infection), Encounter for long-term (current) use of insulin ([facility]) (09/25/2013), Heart burn, High cholesterol, Hypertension, Hypothyroidism, postsurgical (1970), Indigestion, Infectious disease, Jaundice (2021 Liver disease), Joint replacement, Liver disease, Liver failure ([facility]), Pain, Polyneuropathy in diabetes(357.2) (06/10/2015), Status post appendectomy, and 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  MEDS:   Current Outpatient Medications:     atorvastatin (LIPITOR) 40 MG Tab, Take 1 Tablet by mouth every evening., Disp: 100 Tablet, Rfl: 3    amLODIPine (NORVASC) 5 MG Tab, Take 1 Tablet by mouth every day., Disp: 100 Tablet, Rfl: 3    furosemide (LASIX) 40 MG Tab, TAKE 1 TABLET BY MOUTH TWICE A DAY, Disp: 60 Tablet, Rfl: 11    SYNTHROID 125 MCG Tab, Take 2 Tablets by mouth every day. Name brand  medically necessare, Disp: 60 Tablet, Rfl: 6    Continuous Blood Gluc Sensor (FREESTYLE PARISA 3 SENSOR) Misc, 1 Each every 14 days., Disp: 6 Each, Rfl: 3    Insulin Aspart, w/Niacinamide, (FIASP FLEXTOUCH) 100 UNIT/ML Solution Pen-injector, Inject 10 Units under the skin 3 times a day before meals., Disp: 30 mL, Rfl: 3    fenofibrate (TRICOR) 48 MG Tab, TAKE 1 TABLET BY MOUTH EVERY MORNING AFTER A MEAL, Disp: , Rfl:     ondansetron (ZOFRAN ODT) 8 MG TABLET DISPERSIBLE, 1 TABLET ORAL EVERY 8 HOURS FOR NAUSEA/VOMITING, Disp: , Rfl:     omeprazole (PRILOSEC) 40 MG delayed-release capsule, Take 40 mg by mouth every day., Disp: , Rfl:     famotidine (PEPCID) 20 MG Tab, TAKE 1 TABLET BY MOUTH TWICE A DAY, Disp: 60 Tablet, Rfl: 2    potassium chloride (MICRO-K) 10 MEQ capsule, Take 1 Capsule by mouth every morning., Disp: , Rfl:     BD PEN NEEDLE SOFIA U/F, For use with 2-3 daily insulin, Disp: 400 Each, Rfl: 3    Insulin Glargine, 1 Unit Dial, (TOUJEO SOLOSTAR) 300 UNIT/ML Solution Pen-injector, Inject 45 Units under the skin at bedtime., Disp: 13.5 mL, Rfl: 3    insulin aspart (NOVOLOG FLEXPEN) 100 UNIT/ML injection PEN, Inject 15 Units under the skin 3 times a day before meals., Disp: 45 mL, Rfl: 3    riFAMPin (RIFADINE) 300 MG Cap, Take 300 Capsules by mouth 2 times a day., Disp: , Rfl:     oxyCODONE-acetaminophen (PERCOCET) 7.5-325 MG per tablet, Take  by mouth as needed., Disp: , Rfl:     ursodiol (ACTIGALL) 300 MG Cap, Take 300 mg by mouth 3 times a day., Disp: , Rfl:     Vitamin D, Ergocalciferol, 50 MCG (2000 UT) Cap, Take 5,000 Units by mouth every day., Disp: , Rfl:     metoprolol SR (TOPROL XL) 50 MG TABLET SR 24 HR, 50 mg every morning., Disp: , Rfl:     metoprolol SR (TOPROL XL) 100 MG TABLET SR 24 HR, 100 mg every morning., Disp: , Rfl:     ezetimibe (ZETIA) 10 MG Tab, 10 mg every evening., Disp: , Rfl:     traZODone (DESYREL) 100 MG Tab, Take 100 mg by mouth at bedtime., Disp: , Rfl:   ALLERGIES:   Allergies    Allergen Reactions    Tape Unspecified and Rash     Blisters, paper tape is ok  Other reaction(s): Rash     SURGHX:   Past Surgical History:   Procedure Laterality Date    ID ERCP,DIAGNOSTIC N/A 01/14/2022    Procedure: ERCP, DIAGNOSTIC - WITH SIGMOID COLON BIOPSY AND STENT REMOVAL;  Surgeon: Cr Haro M.D.;  Location: [facility];  Service: Gastroenterology    THADDEUS BY LAPAROSCOPY N/A 10/28/2021    Procedure: CHOLECYSTECTOMY, LAPAROSCOPIC;  Surgeon: Tello Trammell M.D.;  Location: [facility];  Service: General    ID ERCP,DIAGNOSTIC N/A 10/26/2021    Procedure: ERCP (ENDOSCOPIC RETROGRADE CHOLANGIOPANCREATOGRAPHY);  Surgeon: Cr Haro M.D.;  Location: SURGERY SAME DAY [facility];  Service: Gastroenterology    ID UPPER GI ENDOSCOPY,BIOPSY N/A 10/26/2021    Procedure: GASTROSCOPY, WITH BIOPSY;  Surgeon: Cr Haro M.D.;  Location: SURGERY SAME DAY [facility];  Service: Gastroenterology    ID UPPER GI ENDOSCOPY,DIAGNOSIS N/A 10/26/2021    Procedure: GASTROSCOPY;  Surgeon: Cr Haro M.D.;  Location: SURGERY SAME DAY [facility];  Service: Gastroenterology    CATH PLACEMENT  01/25/2020    Procedure: INSERTION, CATHETER PERM;  Surgeon: Rola Mendoza M.D.;  Location: [facility];  Service: General    IRRIGATION & DEBRIDEMENT NEURO  01/19/2020    Procedure: IRRIGATION AND DEBRIDEMENT, WOUND THORACIC AND LUMBAR;  Surgeon: Ryan Roman M.D.;  Location: [facility];  Service: Neurosurgery    PB IMPLANT NEUROSTIM/ N/A 12/16/2019    Procedure: EXPLORATION AT THORACIC 8 - 9, REPLACEMENT OF  NEUROSTIMULATOR LEAD;  Surgeon: Ryan Roman M.D.;  Location: [facility];  Service: Neurosurgery    SPINAL CORD STIMULATOR N/A 10/26/2018    Procedure: SPINAL CORD STIMULATOR;  Surgeon: Ryan Roman M.D.;  Location: [facility];  Service: Neurosurgery    THORACIC LAMINECTOMY N/A 10/26/2018    Procedure: THORACIC LAMINECTOMY - FOR;  Surgeon:  Ryan Roman M.D.;  Location: SURGERY [facility];  Service: Neurosurgery    UT NJX AA&/STRD TFRML EPI LUMBAR/SACRAL 1 LEVEL Right 08/31/2016    Procedure: INJ-FORAMEN EPI LUM/SAC SNGL L4-5;  Surgeon: Sukhi Godfrey M.D.;  Location: SURGERY [facility];  Service: Pain Management    LUMBAR LAMINECTOMY DISKECTOMY Right 05/10/2016    Procedure: LUMBAR L4-5 HEMILAMINECTOMY DISKECTOMY ;  Surgeon: Arnold Keyes M.D.;  Location: SURGERY [facility];  Service:     CERVICAL FUSION POSTERIOR  01/16/2009    Performed by TARA CONTRERAS at [facility]    HARDWARE REMOVAL NEURO  01/16/2009    Performed by TARA CONTRERAS at [facility]    NECK EXPLORATION  01/16/2009    Performed by TARA CONTRERAS at [facility]    SHOULDER ARTHROSCOPY W/ ROTATOR CUFF REPAIR  10/09/2008    Performed by SHERLY CASTANEDA at [facility]    SHOULDER DECOMPRESSION ARTHROSCOPIC  06/17/2008    Performed by SHERLY CASTANEDA at [facility]    CLAVICLE DISTAL EXCISION  06/17/2008    Performed by SHERLY CASTANEDA at [facility]    CERVICAL DISK AND FUSION ANTERIOR  03/12/2008    HYSTERECTOMY, VAGINAL  2006    APPENDECTOMY  2004    THYROID LOBECTOMY  1973    TONSILLECTOMY  1963    LUMPECTOMY  1976, 2005    Breast     LUMPECTOMY      OTHER ABDOMINAL SURGERY  2004    OTHER CARDIAC SURGERY  2020 stents inserted     SOCHX:  reports that she has been smoking cigarettes. She has a 15.0 pack-year smoking history. She has never used smokeless tobacco. She reports that she does not currently use alcohol. She reports current drug use. Drugs: Inhaled, Oral, and Marijuana.  FH: family history includes Cancer in her father; Hypertension in her mother.      Review of Systems   Constitutional:  Negative for chills.   Gastrointestinal:  Positive for nausea. Negative for vomiting.   Genitourinary:  Positive for dysuria, frequency, hematuria, hesitancy and urgency. Negative for flank pain.  "      Medications, Allergies, and current problem list reviewed today in Epic           Objective     /82 (BP Location: Right arm, Patient Position: Sitting, BP Cuff Size: Adult)   Pulse 82   Temp 36.3 °C (97.4 °F) (Temporal)   Resp 16   Ht 1.676 m (5' 6\")   Wt 59.4 kg (131 lb)   LMP 04/29/2005 (LMP Unknown)   SpO2 98%   BMI 21.14 kg/m²      Physical Exam  Vitals and nursing note reviewed.   Constitutional:       General: She is not in acute distress.     Appearance: Normal appearance. She is not ill-appearing, toxic-appearing or diaphoretic.   HENT:      Head: Normocephalic and atraumatic.      Right Ear: External ear normal.      Left Ear: External ear normal.      Nose: Nose normal.   Eyes:      Conjunctiva/sclera: Conjunctivae normal.   Cardiovascular:      Rate and Rhythm: Normal rate and regular rhythm.      Heart sounds: Normal heart sounds.   Pulmonary:      Effort: Pulmonary effort is normal. No respiratory distress.      Breath sounds: Normal breath sounds. No wheezing, rhonchi or rales.   Abdominal:      General: Abdomen is flat. There is no distension.      Palpations: Abdomen is soft.      Tenderness: There is no abdominal tenderness. There is no right CVA tenderness, left CVA tenderness, guarding or rebound.      Comments: No suprapubic tenderness   Musculoskeletal:      Cervical back: Normal range of motion and neck supple.   Skin:     General: Skin is warm and dry.   Neurological:      General: No focal deficit present.      Mental Status: She is alert and oriented to person, place, and time. Mental status is at baseline.   Psychiatric:         Mood and Affect: Mood normal.         Behavior: Behavior normal.         Thought Content: Thought content normal.         Judgment: Judgment normal.                             Assessment & Plan     This is a very pleasant 68-year-old female presenting with couple days of UTI type symptoms including urinary frequency, urgency and dysuria.  Denies " fever, abdominal pain, flank pain or vomiting.  No pertinent past  or renal history.  Some low back pain but likely unrelated to acute UTI symptoms.  Denies vaginal symptoms including discharge, irritation or itching.  Vitals normal and exam benign.  Urinalysis with cloudy urine, positive leukocytes.  Based on symptoms, presentation and exam there are no signs of renal involvement.  Patient be treated for acute cystitis while we await results of urine culture.    1. Acute cystitis with hematuria  Urine Culture    cefdinir (OMNICEF) 300 MG Cap      2. Dysuria  POCT Urinalysis          Take full course of antibiotic  Urine culture initiated, I will only reach patient if I need to change antibiotic pending results  Significantly increase fluids  OTC meds including Motrin or Azo if tolerated  Signs and symptoms of worsening infection discussed at length      I personally reviewed prior external notes and test results pertinent to today's visit. Return to clinic or go to ED if symptoms worsen or persist. Red flag symptoms and indications for ED discussed at length. Patient/Parent/Guardian voices understanding.  AVS with post-visit instructions provided or given verbally.  Follow-up with your primary care provider in 3-5 days. All side effects and potential interactions of prescribed medication discussed including allergic response, GI upset, tendon injury, rash, sedation, OCP effectiveness, etc.    Please note that this dictation was created using voice recognition software. I have made every reasonable attempt to correct obvious errors, but I expect that there are errors of grammar and possibly content that I did not discover before finalizing the note.

## 2023-10-22 LAB
BACTERIA UR CULT: NORMAL
SIGNIFICANT IND 70042: NORMAL
SITE SITE: NORMAL
SOURCE SOURCE: NORMAL

## 2023-12-11 RX ORDER — FAMOTIDINE 20 MG/1
TABLET, FILM COATED ORAL
Qty: 180 TABLET | Refills: 3 | Status: SHIPPED | OUTPATIENT
Start: 2023-12-11

## 2023-12-15 ENCOUNTER — HOSPITAL ENCOUNTER (OUTPATIENT)
Dept: LAB | Facility: MEDICAL CENTER | Age: 68
End: 2023-12-15
Attending: INTERNAL MEDICINE
Payer: MEDICARE

## 2023-12-15 ENCOUNTER — HOSPITAL ENCOUNTER (OUTPATIENT)
Dept: RADIOLOGY | Facility: MEDICAL CENTER | Age: 68
End: 2023-12-15
Attending: INTERNAL MEDICINE
Payer: MEDICARE

## 2023-12-15 DIAGNOSIS — E89.0 HYPOTHYROIDISM, POSTSURGICAL: ICD-10-CM

## 2023-12-15 DIAGNOSIS — Z98.61 CAD S/P PERCUTANEOUS CORONARY ANGIOPLASTY: ICD-10-CM

## 2023-12-15 DIAGNOSIS — I25.10 CAD S/P PERCUTANEOUS CORONARY ANGIOPLASTY: ICD-10-CM

## 2023-12-15 LAB
T4 FREE SERPL-MCNC: 1.34 NG/DL (ref 0.93–1.7)
TSH SERPL DL<=0.005 MIU/L-ACNC: 10.3 UIU/ML (ref 0.38–5.33)

## 2023-12-15 PROCEDURE — 36415 COLL VENOUS BLD VENIPUNCTURE: CPT

## 2023-12-15 PROCEDURE — 84439 ASSAY OF FREE THYROXINE: CPT

## 2023-12-15 PROCEDURE — 84443 ASSAY THYROID STIM HORMONE: CPT

## 2023-12-15 PROCEDURE — 93975 VASCULAR STUDY: CPT

## 2023-12-18 PROCEDURE — 93975 VASCULAR STUDY: CPT | Mod: 26 | Performed by: INTERNAL MEDICINE

## 2023-12-19 ENCOUNTER — TELEPHONE (OUTPATIENT)
Dept: CARDIOLOGY | Facility: MEDICAL CENTER | Age: 68
End: 2023-12-19
Payer: MEDICARE

## 2023-12-21 ENCOUNTER — OFFICE VISIT (OUTPATIENT)
Dept: ENDOCRINOLOGY | Facility: MEDICAL CENTER | Age: 68
End: 2023-12-21
Attending: INTERNAL MEDICINE
Payer: MEDICARE

## 2023-12-21 VITALS
SYSTOLIC BLOOD PRESSURE: 134 MMHG | HEIGHT: 66 IN | WEIGHT: 129 LBS | DIASTOLIC BLOOD PRESSURE: 82 MMHG | BODY MASS INDEX: 20.73 KG/M2 | OXYGEN SATURATION: 95 % | HEART RATE: 85 BPM

## 2023-12-21 DIAGNOSIS — E55.9 VITAMIN D DEFICIENCY: ICD-10-CM

## 2023-12-21 DIAGNOSIS — N18.32 STAGE 3B CHRONIC KIDNEY DISEASE: ICD-10-CM

## 2023-12-21 DIAGNOSIS — E89.0 HYPOTHYROIDISM, POSTSURGICAL: ICD-10-CM

## 2023-12-21 DIAGNOSIS — E10.9 TYPE 1 DIABETES MELLITUS ON INSULIN THERAPY (HCC): ICD-10-CM

## 2023-12-21 DIAGNOSIS — E78.5 DYSLIPIDEMIA: ICD-10-CM

## 2023-12-21 DIAGNOSIS — Z79.4 LONG-TERM INSULIN USE (HCC): ICD-10-CM

## 2023-12-21 LAB
HBA1C MFR BLD: 8.4 % (ref ?–5.8)
POCT INT CON NEG: NEGATIVE
POCT INT CON POS: POSITIVE

## 2023-12-21 PROCEDURE — 99214 OFFICE O/P EST MOD 30 MIN: CPT | Performed by: INTERNAL MEDICINE

## 2023-12-21 PROCEDURE — 3079F DIAST BP 80-89 MM HG: CPT | Performed by: INTERNAL MEDICINE

## 2023-12-21 PROCEDURE — 83036 HEMOGLOBIN GLYCOSYLATED A1C: CPT | Performed by: INTERNAL MEDICINE

## 2023-12-21 PROCEDURE — 95251 CONT GLUC MNTR ANALYSIS I&R: CPT | Performed by: INTERNAL MEDICINE

## 2023-12-21 PROCEDURE — 3075F SYST BP GE 130 - 139MM HG: CPT | Performed by: INTERNAL MEDICINE

## 2023-12-21 PROCEDURE — 99215 OFFICE O/P EST HI 40 MIN: CPT | Performed by: INTERNAL MEDICINE

## 2023-12-21 RX ORDER — VENLAFAXINE HYDROCHLORIDE 150 MG/1
150 CAPSULE, EXTENDED RELEASE ORAL DAILY
COMMUNITY
Start: 2023-12-19

## 2023-12-21 RX ORDER — LIOTHYRONINE SODIUM 5 UG/1
5 TABLET ORAL DAILY
Qty: 90 TABLET | Refills: 3 | Status: SHIPPED | OUTPATIENT
Start: 2023-12-21

## 2023-12-21 RX ORDER — EMPAGLIFLOZIN 10 MG/1
10 TABLET, FILM COATED ORAL DAILY
Qty: 90 TABLET | Refills: 1 | Status: SHIPPED | OUTPATIENT
Start: 2023-12-21 | End: 2024-02-14

## 2023-12-21 RX ORDER — CYANOCOBALAMIN (VITAMIN B-12) 1000 MCG
1000 TABLET, EXTENDED RELEASE ORAL DAILY
COMMUNITY
End: 2024-03-20

## 2023-12-21 ASSESSMENT — FIBROSIS 4 INDEX: FIB4 SCORE: 1.628571428571428571

## 2023-12-21 NOTE — LETTER
[facility]  Endocrinology Dept  Fax: [phone]   Authorization for Release/Disclosure of   Protected Health Information   Name: KALPANA BUTTS : 1955 SSN: [id number]   Address: [address] [place] [address]   [address]  [place] [address] Phone:    [phone] (home)    I authorize the entity listed below to release/disclose the PHI below to:   [facility]/ Josue Paz M.D.   Provider or Entity Name:  [facility]   Address   City, State, [facility]   Phone:      Fax:     Reason for request: continuity of care   Information to be released:    Retinal exam    [  ] Check here and initial the line next to each item to release ALL health information INCLUDING  _____ Care and treatment for drug and / or alcohol abuse  _____ HIV testing, infection status, or AIDS  _____ Genetic Testing    DATES OF SERVICE OR TIME PERIOD TO BE DISCLOSED: _____________  I understand and acknowledge that:  * This Authorization may be revoked at any time by you in writing, except if your health information has already been used or disclosed.  * Your health information that will be used or disclosed as a result of you signing this authorization could be re-disclosed by the recipient. If this occurs, your re-disclosed health information may no longer be protected by State or Federal laws.  * You may refuse to sign this Authorization. Your refusal will not affect your ability to obtain treatment.  * This Authorization becomes effective upon signing and will  on (date) __________.      If no date is indicated, this Authorization will  one (1) year from the signature date.    Name: Kalpana Butts  Signature:  continued medical care Date:   2023     PLEASE FAX REQUESTED RECORDS BACK TO: [phone]

## 2023-12-21 NOTE — PROGRESS NOTES
CHIEF COMPLAINT: Patient is here for follow up of Type 1 Diabetes Mellitus    HPI:     Anu Manuel is a 68 y.o. female with Type 1 Diabetes Mellitus here for follow up.    Labs from 12/21/2023 HbA1c is 8.4%      She was diagnosed with type 1 diabetes at the age of 34.  She does not really carb count well and estimates her insulin  She has a hx of CAD and is a smoker  She also has postsurgical hypothyroidism  She underwent a total thyroidectomy at the age of 14   (pathology was benign)  She also has depression and anxiety and is on the Lasix and      She admits to noncompliance with her meal time insulin       She is on Toujeo 26u daily  Fiasp 1:5 grams  Correction 1:50> 150      She has been wearing a CGM John 3 for over 6 months  Average  with TIR opf 50%  She has a lot of highs after meals from not taking her meal time    She admits to episodes of severe N/V - most likely due to very high sugars  She is seeing gastroenterology  She had a gastric emptying study recently which showed normal gastric emptying half time was 85 minutes  She admits to worsening depression but is still taking her venlafaxine.          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 Murry  She is currently not on an ACE inhibitor for unknown reasons  She is on amlodipine 5 mg daily and Lasix 40 mg daily     Latest Reference Range & Units 09/18/23 14:47   Micro Alb Creat Ratio 0 - 30 mg/g 2054 (H)   Creatinine, Urine mg/dL 39.59   Microalbumin, Urine Random mg/dL 81.3       Her last eye exam was on 8/16/2022  She  reports that she has an upcoming appointment      With respect to her postsurgical hypothyroidism she is to be on combination therapy with brand-name Synthroid and Cytomel  She has tried and failed generic levothyroxine  Currently she is just on Synthroid 250 mcg daily (125mcg x2)  without Cytomel  She admits to forgetting her thyroid hormone sometimes        BG Diary:12/21/23  CGM was downloaded and reviewed    Weight has been stable    Diabetes Complications   Retinopathy: No known retinopathy.  Last eye exam: We are requesting records of her eye exam  Neuropathy: Denies paresthesias or numbness in hands or feet. Denies any foot wounds.  Exercise: Minimal.  Diet: Fair.  Patient's medications, allergies, and social histories were reviewed and updated as appropriate.    ROS:     CONS:     No fever, no chills   EYES:     No diplopia, no blurry vision   CV:           No chest pain, no palpitations   PULM:     No SOB, no cough, no hemoptysis.   GI:            No nausea, no vomiting, no diarrhea, no constipation   ENDO:     No polyuria, no polydipsia, no heat intolerance, no cold intolerance       Past Medical History:  Problem List:  2023-03: Senile purpura (HCC)  2022-08: Long-term insulin use (HCC)  2022-01: Liver failure without hepatic coma (HCC)  2021-11: Constipation  2021-11: Elevated LFTs  2021-11: Fluid collection at surgical site  2021-11: Right upper quadrant pain  2021-10: Anasarca  2021-10: Intractable pain  2021-10: Cholecystitis  2021-10: Dehydration  2021-10: Bilious vomiting with nausea  2021-10: Hepatitis  2021-10: Jaundice  2021-10: Current moderate episode of major depressive disorder (HCC)  2021-10: Generalized abdominal pain  2021-09: Epigastric pain  2021-09: Elevated liver enzymes  2021-09: Anemia  2021-09: Hyponatremia  2021-09: Elevated troponin  2020-08: Pain in the chest  2020-08: CAD S/P percutaneous coronary angioplasty  2020-08: Hypothyroidism  2020-06: Stage 3a chronic kidney disease  ([facility])  2020-06: Dyslipidemia  2020-06: GERD (gastroesophageal reflux disease)  2020-06: Lung nodule  2020-06: Hemoptysis  2020-01: Chest pain with 8 beat run of VT   2020-01: Anxiety  2020-01: Nausea & vomiting  2020-01: Essential hypertension  2020-01: Hypoglycemia  2020-01: RHEA (acute kidney injury) ([facility])  2020-01: Post-operative wound abscess  2020-01: Tobacco abuse  2018-03: Cellulitis  2018-03: Left hip pain  2016-08: Acute left lumbar radiculopathy  2016-05: Lumbar spinal stenosis  2015-06: Type 1 diabetes mellitus with neurological manifestations,   uncontrolled  2015-06: Diabetic polyneuropathy (CMS-HCC)  2015-04: Vertigo  2013-09: Type 1 diabetes mellitus on insulin therapy ([facility])  2013-09: Advanced care planning/counseling discussion  2012-08: Accidental drug overdose  2012-03: Vitamin d deficiency  Hypothyroidism, postsurgical  Type 1 diabetes mellitus with kidney complication ([facility])  Cigarette smoker      Past Surgical History:  Past Surgical History:   Procedure Laterality Date    [place] ERCP,DIAGNOSTIC N/A 01/14/2022    Procedure: ERCP, DIAGNOSTIC - WITH SIGMOID COLON BIOPSY AND STENT REMOVAL;  Surgeon: Cr Haro M.D.;  Location: SURGERY [facility];  Service: Gastroenterology    THADDEUS BY LAPAROSCOPY N/A 10/28/2021    Procedure: CHOLECYSTECTOMY, LAPAROSCOPIC;  Surgeon: Tello Trammell M.D.;  Location: SURGERY [facility];  Service: General    [place] ERCP,DIAGNOSTIC N/A 10/26/2021    Procedure: ERCP (ENDOSCOPIC RETROGRADE CHOLANGIOPANCREATOGRAPHY);  Surgeon: Cr Haro M.D.;  Location: SURGERY SAME DAY [facility];  Service: Gastroenterology    NY UPPER GI ENDOSCOPY,BIOPSY N/A 10/26/2021    Procedure: GASTROSCOPY, WITH BIOPSY;  Surgeon: Cr Haro M.D.;  Location: SURGERY SAME DAY [facility];  Service: Gastroenterology    [place] UPPER GI ENDOSCOPY,DIAGNOSIS N/A 10/26/2021    Procedure: GASTROSCOPY;  Surgeon: Cr Haro M.D.;  Location: SURGERY SAME DAY [facility];  Service: Gastroenterology    CATH PLACEMENT   01/25/2020    Procedure: INSERTION, CATHETER PERM;  Surgeon: Rola Mendoza M.D.;  Location: SURGERY [facility];  Service: General    IRRIGATION & DEBRIDEMENT NEURO  01/19/2020    Procedure: IRRIGATION AND DEBRIDEMENT, WOUND THORACIC AND LUMBAR;  Surgeon: Ryan Roman M.D.;  Location: SURGERY [facility];  Service: Neurosurgery    PB IMPLANT NEUROSTIM/ N/A 12/16/2019    Procedure: EXPLORATION AT THORACIC 8 - 9, REPLACEMENT OF  NEUROSTIMULATOR LEAD;  Surgeon: Ryan Roman M.D.;  Location: SURGERY [facility];  Service: Neurosurgery    SPINAL CORD STIMULATOR N/A 10/26/2018    Procedure: SPINAL CORD STIMULATOR;  Surgeon: Ryan Roman M.D.;  Location: SURGERY [facility];  Service: Neurosurgery    THORACIC LAMINECTOMY N/A 10/26/2018    Procedure: THORACIC LAMINECTOMY - FOR;  Surgeon: Ryan Roman M.D.;  Location: [facility];  Service: Neurosurgery    PA NJX AA&/STRD TFRML EPI LUMBAR/SACRAL 1 LEVEL Right 08/31/2016    Procedure: INJ-FORAMEN EPI LUM/SAC SNGL L4-5;  Surgeon: Sukhi Godfrey M.D.;  Location: SURGERY [facility];  Service: Pain Management    LUMBAR LAMINECTOMY DISKECTOMY Right 05/10/2016    Procedure: LUMBAR L4-5 HEMILAMINECTOMY DISKECTOMY ;  Surgeon: Arnold Keyes M.D.;  Location: [facility];  Service:     CERVICAL FUSION POSTERIOR  01/16/2009    Performed by TARA CONTRERAS at [facility]    HARDWARE REMOVAL NEURO  01/16/2009    Performed by TARA CONTRERAS at [facility]    NECK EXPLORATION  01/16/2009    Performed by TARA CONTRERAS at [facility]    SHOULDER ARTHROSCOPY W/ ROTATOR CUFF REPAIR  10/09/2008    Performed by SHERLY CASTANEDA at [facility]    SHOULDER DECOMPRESSION ARTHROSCOPIC  06/17/2008    Performed by SHERLY CASTANEDA at [facility]    CLAVICLE DISTAL EXCISION  06/17/2008    Performed by SHERLY CASTANEDA at [facility]    CERVICAL DISK AND  "FUSION ANTERIOR  03/12/2008    HYSTERECTOMY, VAGINAL  2006    APPENDECTOMY  2004    THYROID LOBECTOMY  1973    TONSILLECTOMY  1963    LUMPECTOMY  1976, 2005    Breast     LUMPECTOMY      OTHER ABDOMINAL SURGERY  2004    OTHER CARDIAC SURGERY  2020 stents inserted        Allergies:  Tape     Social History:  Social History     Tobacco Use    Smoking status: Every Day     Current packs/day: 0.50     Average packs/day: 0.5 packs/day for 30.0 years (15.0 ttl pk-yrs)     Types: Cigarettes    Smokeless tobacco: Never   Vaping Use    Vaping Use: Never used   Substance Use Topics    Alcohol use: Not Currently    Drug use: Yes     Types: Inhaled, Oral, Marijuana     Comment: Marijuana - Occasional; edibles        Family History:   family history includes Cancer in her father; Hypertension in her mother.      PHYSICAL EXAM:   OBJECTIVE:  Vital signs: /82 (BP Location: Left arm, Patient Position: Sitting)   Pulse 85   Ht 1.676 m (5' 6\")   Wt 58.5 kg (129 lb)   LMP 04/29/2005 (LMP Unknown)   SpO2 95%   BMI 20.82 kg/m²   GENERAL: Well-developed, well-nourished in no apparent distress.   EYE:  No ocular asymmetry, PERRLA  HENT: Pink, moist mucous membranes.    NECK: No thyromegaly.   CARDIOVASCULAR:  No murmurs  LUNGS: Clear breath sounds  ABDOMEN: Soft, nontender   EXTREMITIES: No clubbing, cyanosis, or edema.   NEUROLOGICAL: No gross focal motor abnormalities   LYMPH: No cervical adenopathy palpated.   SKIN: No rashes, lesions.     Labs:  Lab Results   Component Value Date/Time    HBA1C 8.4 (A) 12/21/2023 08:38 AM        Lab Results   Component Value Date/Time    WBC 7.5 09/18/2023 02:47 PM    RBC 3.53 (L) 09/18/2023 02:47 PM    HEMOGLOBIN 10.7 (L) 09/18/2023 02:47 PM    MCV 96.0 09/18/2023 02:47 PM    MCH 30.3 09/18/2023 02:47 PM    MCHC 31.6 (L) 09/18/2023 02:47 PM    RDW 51.1 (H) 09/18/2023 02:47 PM    MPV 10.4 09/18/2023 02:47 PM       Lab Results   Component Value Date/Time    SODIUM 134 (L) 09/18/2023 02:47 PM " "   POTASSIUM 4.9 09/18/2023 02:47 PM    CHLORIDE 102 09/18/2023 02:47 PM    CO2 20 09/18/2023 02:47 PM    ANION 12.0 09/18/2023 02:47 PM    GLUCOSE 231 (H) 09/18/2023 02:47 PM    BUN 45 (H) 09/18/2023 02:47 PM    CREATININE 1.80 (H) 09/18/2023 02:47 PM    CREATININE 1.0 01/08/2009 04:31 PM    CALCIUM 8.5 09/18/2023 02:47 PM    ASTSGOT 38 08/17/2023 08:50 AM    ALTSGPT 25 08/17/2023 08:50 AM    TBILIRUBIN 0.6 08/17/2023 08:50 AM    ALBUMIN 3.2 08/17/2023 08:50 AM    ALBUMIN 3.35 (L) 05/09/2023 01:16 PM    TOTPROTEIN 7.3 08/17/2023 08:50 AM    TOTPROTEIN 6.5 05/09/2023 01:16 PM    GLOBULIN 4.1 (H) 08/17/2023 08:50 AM    AGRATIO 0.8 08/17/2023 08:50 AM       Lab Results   Component Value Date/Time    CHOLSTRLTOT 232 (H) 08/17/2023 0850    TRIGLYCERIDE 52 08/17/2023 0850     08/17/2023 0850    LDL 77 08/17/2023 0850       Lab Results   Component Value Date/Time    MALBCRT 2054 (H) 09/18/2023 02:47 PM    MICROALBUR 81.3 09/18/2023 02:47 PM        Lab Results   Component Value Date/Time    TSHULTRASEN 10.300 (H) 12/15/2023 1544     No results found for: \"FREEDIR\"  Lab Results   Component Value Date/Time    FREET3 1.88 (L) 09/18/2023 1448     No results found for: \"THYSTIMIG\"        ASSESSMENT/PLAN:     1. Type 1 diabetes mellitus on insulin therapy (HCC)  Uncontrolled secondary to hyperglycemia and noncompliance with mealtime insulin boluses  She is not a candidate for insulin pump therapy because of noncompliance  Recommend better compliance with mealtime insulin boluses  Explained to patient that severe hyperglycemia can cause acute gastroparesis which may explain her episodic nausea and vomiting  Increase Toujeo to 28 units  Continue NovoLog carb ratio 1 unit for every 5 g  Continue same correction scale of 1 unit for every 50 above 150  CGM was downloaded and reviewed  Recommend follow-up in 3 months with complete labs      2. Hypothyroidism, postsurgical  Uncontrolled  TSH is 10 previously TSH was much more " elevated  She cannot tolerate generic medication and prefers brand-name medication  Historically she did better when she was on Cytomel combination therapy with Synthroid  Another provider discontinued her Cytomel  I am putting her back on Cytomel 5 mcg daily which can be generic  Continue brand-name Synthroid 250 mcg daily or 2 x 125 mcg daily  Return to clinic in 3 months with labs    3. Dyslipidemia  Controlled  Continue atorvastatin  Repeat fasting lipids in 3 months    4. Vitamin D deficiency  Stable   Vitamin D labs were reviewed with patient  Continue current supplements  Continue monitoring levels       5. Stage 3b chronic kidney disease (HCC)  Stable  It is unclear why she is not on ACE inhibitor however she may have had hyperkalemia in the past I will check with nephrology.  I think that she is a good candidate for an SGLT2 inhibitor despite the diagnosis of type I because of her chronic kidney disease and albuminuria.  I will request clearance with nephrology to place her on an SGLT2 inhibitor like Jardiance for her CKD    6. Long-term insulin use (HCC)  Patient is on long-term basal bolus therapy for type 1 diabetes      Return in about 3 months (around 3/21/2024).      Total time spent on day of service was over 60 minutes which included obtaining a detailed history and physical exam, ordering labs, coordinating care and scheduling future follow-up    This patient during there office visit today was started on a new medication.  Side effects of the new medication were discussed with the patient today in the office.     Thank you kindly for allowing me to participate in the diabetes care plan for this patient.    Josue Paz MD, YOAV, [facility]  12/21/23    CC:   Jarrell Yates M.D.

## 2023-12-21 NOTE — PROGRESS NOTES
"RN-CDE Note    Subjective:   Endocrinology Clinic Progress Note  PCP: Jarrell Yates M.D.    HPI:  Anu Manuel is a 68 y.o. old patient who is seen today by the Diabetes Nurse Specialist for review of her uncontrolled type 1 diabetes.    Recent changes in health: increased depression recently  DM:   Last A1c:   Lab Results   Component Value Date/Time    HBA1C 8.4 (A) 12/21/2023 08:38 AM      Previous A1c was 8.3 on 8/28/23  A1C GOAL: < 7    Diabetes Medications:   Toujeo 26 units per day  Fiasp using a 1:5 cho ratio, correction ratio is  1:50 over 150      Exercise: sporadic irregular exercise, <half hour walking weekly  Diet: \"healthy\" diet  in general  Patient's body mass index is 20.82 kg/m². Exercise and nutrition counseling were performed at this visit.    Glucose monitoring frequency: using Freestyle John    Hypoglycemic episodes: yes - on occasion.   Last Retinal Exam:  request for records sent to Kary Mo OD  Daily Foot Exam: Yes   Foot Exam:  Monofilament: current.   Lab Results   Component Value Date/Time    MALBCRT 2054 (H) 09/18/2023 02:47 PM    MICROALBUR 81.3 09/18/2023 02:47 PM        ACR Albumin/Creatinine Ratio goal <30     HTN:   Blood pressure goal <130/<80 .   Currently Rx ACE/ARB: No     Dyslipidemia:    Lab Results   Component Value Date/Time    CHOLSTRLTOT 232 (H) 08/17/2023 08:50 AM    LDL 77 08/17/2023 08:50 AM     08/17/2023 08:50 AM    TRIGLYCERIDE 52 08/17/2023 08:50 AM         Currently Rx Statin: Yes     She  reports that she has been smoking cigarettes. She has a 15.0 pack-year smoking history. She has never used smokeless tobacco.      Plan:     Discussed and educated on:   - All medications, side effects and compliance (discussed carefully)  - Annual eye examinations at Ophthalmology  - Factors Affecting Blood Glucose Control: stress  - HbA1C: target  - Home glucose monitoring emphasized  - Weight control and daily exercise    "

## 2024-01-01 ENCOUNTER — PATIENT MESSAGE (OUTPATIENT)
Dept: HEALTH INFORMATION MANAGEMENT | Facility: OTHER | Age: 69
End: 2024-01-01

## 2024-02-06 ENCOUNTER — TELEPHONE (OUTPATIENT)
Dept: ENDOCRINOLOGY | Facility: MEDICAL CENTER | Age: 69
End: 2024-02-06
Payer: MEDICARE

## 2024-02-06 DIAGNOSIS — E10.9 TYPE 1 DIABETES MELLITUS ON INSULIN THERAPY (HCC): ICD-10-CM

## 2024-02-06 RX ORDER — INSULIN GLARGINE 300 U/ML
45 INJECTION, SOLUTION SUBCUTANEOUS
Qty: 13.5 ML | Refills: 3 | Status: SHIPPED | OUTPATIENT
Start: 2024-02-06 | End: 2024-02-28

## 2024-02-06 NOTE — TELEPHONE ENCOUNTER
Received Refill PA request via [facility]  for Insulin Glargine, 1 Unit Dial, (TOUJEO SOLOSTAR) 300 UNIT/ML Solution Pen-injector. (Quantity:13.5, Day Supply:90)     Insurance: [employer]  Member ID:  [id number]  BIN: [id number]  PCN: [id number]  Group: [facility]     Ran Test claim via [employer] & medication Pays for a 87.50 copay. Will outreach to patient to offer specialty pharmacy services and or release to preferred pharmacy

## 2024-02-06 NOTE — TELEPHONE ENCOUNTER
Received request via: Patient    Was the patient seen in the last year in this department? Yes    Does the patient have an active prescription (recently filled or refills available) for medication(s) requested? No    Pharmacy Name: [facility]     Does the patient have [facility] and need 100 day supply (blood pressure, diabetes and cholesterol meds only)? Yes, quantity updated to 100 days

## 2024-02-12 ENCOUNTER — HOSPITAL ENCOUNTER (OUTPATIENT)
Dept: LAB | Facility: MEDICAL CENTER | Age: 69
End: 2024-02-12
Attending: INTERNAL MEDICINE
Payer: MEDICARE

## 2024-02-12 DIAGNOSIS — R60.9 EDEMA, UNSPECIFIED TYPE: ICD-10-CM

## 2024-02-12 DIAGNOSIS — E03.9 HYPOTHYROIDISM, UNSPECIFIED TYPE: ICD-10-CM

## 2024-02-12 DIAGNOSIS — I10 ESSENTIAL HYPERTENSION: ICD-10-CM

## 2024-02-12 LAB
ALBUMIN SERPL BCP-MCNC: 2.7 G/DL (ref 3.2–4.9)
ALBUMIN/GLOB SERPL: 0.5 G/DL
ALP SERPL-CCNC: 617 U/L (ref 30–99)
ALT SERPL-CCNC: 30 U/L (ref 2–50)
ANION GAP SERPL CALC-SCNC: 13 MMOL/L (ref 7–16)
ANION GAP SERPL CALC-SCNC: 14 MMOL/L (ref 7–16)
AST SERPL-CCNC: 56 U/L (ref 12–45)
BILIRUB SERPL-MCNC: 0.6 MG/DL (ref 0.1–1.5)
BUN SERPL-MCNC: 32 MG/DL (ref 8–22)
BUN SERPL-MCNC: 33 MG/DL (ref 8–22)
CALCIUM ALBUM COR SERPL-MCNC: 9.5 MG/DL (ref 8.5–10.5)
CALCIUM SERPL-MCNC: 8.5 MG/DL (ref 8.5–10.5)
CALCIUM SERPL-MCNC: 8.9 MG/DL (ref 8.5–10.5)
CHLORIDE SERPL-SCNC: 100 MMOL/L (ref 96–112)
CHLORIDE SERPL-SCNC: 100 MMOL/L (ref 96–112)
CO2 SERPL-SCNC: 21 MMOL/L (ref 20–33)
CO2 SERPL-SCNC: 21 MMOL/L (ref 20–33)
CREAT SERPL-MCNC: 2.22 MG/DL (ref 0.5–1.4)
CREAT SERPL-MCNC: 2.36 MG/DL (ref 0.5–1.4)
ERYTHROCYTE [DISTWIDTH] IN BLOOD BY AUTOMATED COUNT: 55.1 FL (ref 35.9–50)
ERYTHROCYTE [DISTWIDTH] IN BLOOD BY AUTOMATED COUNT: 56.3 FL (ref 35.9–50)
GFR SERPLBLD CREATININE-BSD FMLA CKD-EPI: 22 ML/MIN/1.73 M 2
GFR SERPLBLD CREATININE-BSD FMLA CKD-EPI: 24 ML/MIN/1.73 M 2
GLOBULIN SER CALC-MCNC: 5.5 G/DL (ref 1.9–3.5)
GLUCOSE SERPL-MCNC: 275 MG/DL (ref 65–99)
GLUCOSE SERPL-MCNC: 278 MG/DL (ref 65–99)
HCT VFR BLD AUTO: 30 % (ref 37–47)
HCT VFR BLD AUTO: 30.4 % (ref 37–47)
HGB BLD-MCNC: 9.5 G/DL (ref 12–16)
HGB BLD-MCNC: 9.7 G/DL (ref 12–16)
INR PPP: 1.02 (ref 0.87–1.13)
MCH RBC QN AUTO: 28.2 PG (ref 27–33)
MCH RBC QN AUTO: 29.1 PG (ref 27–33)
MCHC RBC AUTO-ENTMCNC: 31.3 G/DL (ref 32.2–35.5)
MCHC RBC AUTO-ENTMCNC: 32.3 G/DL (ref 32.2–35.5)
MCV RBC AUTO: 90.1 FL (ref 81.4–97.8)
MCV RBC AUTO: 90.2 FL (ref 81.4–97.8)
PLATELET # BLD AUTO: 307 K/UL (ref 164–446)
PLATELET # BLD AUTO: 335 K/UL (ref 164–446)
PMV BLD AUTO: 10.7 FL (ref 9–12.9)
PMV BLD AUTO: 11.2 FL (ref 9–12.9)
POTASSIUM SERPL-SCNC: 4.7 MMOL/L (ref 3.6–5.5)
POTASSIUM SERPL-SCNC: 4.7 MMOL/L (ref 3.6–5.5)
PROT SERPL-MCNC: 8.2 G/DL (ref 6–8.2)
PROTHROMBIN TIME: 13.5 SEC (ref 12–14.6)
RBC # BLD AUTO: 3.33 M/UL (ref 4.2–5.4)
RBC # BLD AUTO: 3.37 M/UL (ref 4.2–5.4)
SODIUM SERPL-SCNC: 134 MMOL/L (ref 135–145)
SODIUM SERPL-SCNC: 135 MMOL/L (ref 135–145)
WBC # BLD AUTO: 9.3 K/UL (ref 4.8–10.8)
WBC # BLD AUTO: 9.7 K/UL (ref 4.8–10.8)

## 2024-02-12 PROCEDURE — 85027 COMPLETE CBC AUTOMATED: CPT | Mod: 91

## 2024-02-12 PROCEDURE — 36415 COLL VENOUS BLD VENIPUNCTURE: CPT

## 2024-02-12 PROCEDURE — 80053 COMPREHEN METABOLIC PANEL: CPT

## 2024-02-12 PROCEDURE — 80048 BASIC METABOLIC PNL TOTAL CA: CPT

## 2024-02-12 PROCEDURE — 85027 COMPLETE CBC AUTOMATED: CPT

## 2024-02-12 PROCEDURE — 85610 PROTHROMBIN TIME: CPT

## 2024-02-13 PROBLEM — N18.4 CKD (CHRONIC KIDNEY DISEASE) STAGE 4, GFR 15-29 ML/MIN (HCC): Status: ACTIVE | Noted: 2024-02-13

## 2024-02-13 PROBLEM — Z72.0 TOBACCO ABUSE: Chronic | Status: ACTIVE | Noted: 2020-01-18

## 2024-02-13 PROBLEM — K83.1 CHOLESTATIC LIVER DISEASE: Status: ACTIVE | Noted: 2024-02-13

## 2024-02-13 PROBLEM — F11.20 UNCOMPLICATED OPIOID DEPENDENCE (HCC): Status: ACTIVE | Noted: 2024-02-13

## 2024-02-13 PROBLEM — G89.29 CHRONIC LOW BACK PAIN: Status: ACTIVE | Noted: 2024-02-13

## 2024-02-13 PROBLEM — F11.20 UNCOMPLICATED OPIOID DEPENDENCE (HCC): Chronic | Status: ACTIVE | Noted: 2024-02-13

## 2024-02-13 PROBLEM — F33.1 MODERATE EPISODE OF RECURRENT MAJOR DEPRESSIVE DISORDER (HCC): Status: ACTIVE | Noted: 2024-02-13

## 2024-02-13 PROBLEM — I70.0 AORTIC ATHEROSCLEROSIS (HCC): Status: ACTIVE | Noted: 2024-02-13

## 2024-02-13 PROBLEM — M54.50 CHRONIC LOW BACK PAIN: Status: ACTIVE | Noted: 2024-02-13

## 2024-02-14 ENCOUNTER — OFFICE VISIT (OUTPATIENT)
Dept: NEPHROLOGY | Facility: MEDICAL CENTER | Age: 69
End: 2024-02-14
Payer: MEDICARE

## 2024-02-14 ENCOUNTER — APPOINTMENT (OUTPATIENT)
Dept: NEPHROLOGY | Facility: MEDICAL CENTER | Age: 69
End: 2024-02-14
Payer: MEDICARE

## 2024-02-14 VITALS
SYSTOLIC BLOOD PRESSURE: 144 MMHG | DIASTOLIC BLOOD PRESSURE: 80 MMHG | BODY MASS INDEX: 22.82 KG/M2 | OXYGEN SATURATION: 96 % | HEART RATE: 89 BPM | TEMPERATURE: 98.2 F | WEIGHT: 142 LBS | HEIGHT: 66 IN | RESPIRATION RATE: 18 BRPM

## 2024-02-14 DIAGNOSIS — N18.4 STAGE 4 CHRONIC KIDNEY DISEASE (HCC): ICD-10-CM

## 2024-02-14 DIAGNOSIS — Z71.6 TOBACCO ABUSE COUNSELING: ICD-10-CM

## 2024-02-14 DIAGNOSIS — R60.9 EDEMA, UNSPECIFIED TYPE: ICD-10-CM

## 2024-02-14 DIAGNOSIS — I10 ESSENTIAL HYPERTENSION: ICD-10-CM

## 2024-02-14 DIAGNOSIS — R80.9 PROTEINURIA, UNSPECIFIED TYPE: ICD-10-CM

## 2024-02-14 DIAGNOSIS — E10.9 TYPE 1 DIABETES MELLITUS ON INSULIN THERAPY (HCC): ICD-10-CM

## 2024-02-14 PROCEDURE — 3079F DIAST BP 80-89 MM HG: CPT | Performed by: INTERNAL MEDICINE

## 2024-02-14 PROCEDURE — 99214 OFFICE O/P EST MOD 30 MIN: CPT | Performed by: INTERNAL MEDICINE

## 2024-02-14 PROCEDURE — 3077F SYST BP >= 140 MM HG: CPT | Performed by: INTERNAL MEDICINE

## 2024-02-14 RX ORDER — FUROSEMIDE 40 MG/1
80 TABLET ORAL 2 TIMES DAILY
Qty: 360 TABLET | Refills: 3 | Status: SHIPPED | OUTPATIENT
Start: 2024-02-14 | End: 2024-03-20

## 2024-02-14 ASSESSMENT — FIBROSIS 4 INDEX: FIB4 SCORE: 2.01

## 2024-02-14 ASSESSMENT — ENCOUNTER SYMPTOMS
HYPERTENSION: 1
SHORTNESS OF BREATH: 0
COUGH: 0
VOMITING: 0
FEVER: 0
NAUSEA: 0
CHILLS: 0

## 2024-02-14 NOTE — PROGRESS NOTES
"Subjective     Asha Manuel is a 68 y.o. female who presents with Chronic Kidney Disease and Hypertension            Chronic Kidney Disease  This is a chronic problem. The current episode started more than 1 year ago. The problem occurs constantly. The problem has been gradually worsening. Pertinent negatives include no chest pain, chills, coughing, fever, nausea, urinary symptoms or vomiting.   Hypertension  This is a chronic problem. The current episode started more than 1 year ago. The problem has been waxing and waning since onset. The problem is uncontrolled. Associated symptoms include malaise/fatigue and peripheral edema. Pertinent negatives include no chest pain or shortness of breath. Risk factors for coronary artery disease include post-menopausal state and diabetes mellitus. Past treatments include diuretics, beta blockers and calcium channel blockers. The current treatment provides moderate improvement. Compliance problems include diet.  Hypertensive end-organ damage includes kidney disease. Identifiable causes of hypertension include chronic renal disease.       Review of Systems   Constitutional:  Positive for malaise/fatigue. Negative for chills and fever.   Respiratory:  Negative for cough and shortness of breath.    Cardiovascular:  Positive for leg swelling. Negative for chest pain.   Gastrointestinal:  Negative for nausea and vomiting.   Genitourinary:  Negative for dysuria, frequency and urgency.              Objective     BP (!) 144/80 (BP Location: Right arm, Patient Position: Sitting, BP Cuff Size: Adult)   Pulse 89   Temp 36.8 °C (98.2 °F) (Temporal)   Resp 18   Ht 1.676 m (5' 6\")   Wt 64.4 kg (142 lb)   LMP 04/29/2005 (LMP Unknown)   SpO2 96%   BMI 22.92 kg/m²      Physical Exam  Vitals and nursing note reviewed.   Constitutional:       General: She is not in acute distress.     Appearance: Normal appearance. She is well-developed. She is not diaphoretic.   HENT:      Head: " "Normocephalic and atraumatic.      Right Ear: External ear normal.      Left Ear: External ear normal.      Nose: Nose normal.   Eyes:      General: No scleral icterus.        Right eye: No discharge.         Left eye: No discharge.      Conjunctiva/sclera: Conjunctivae normal.   Cardiovascular:      Rate and Rhythm: Normal rate and regular rhythm.      Heart sounds: No murmur heard.  Pulmonary:      Effort: Pulmonary effort is normal. No respiratory distress.      Breath sounds: Normal breath sounds.   Musculoskeletal:         General: No tenderness.      Right lower leg: Edema present.      Left lower leg: Edema present.   Skin:     General: Skin is warm and dry.      Findings: No erythema.   Neurological:      General: No focal deficit present.      Mental Status: She is alert and oriented to person, place, and time.      Cranial Nerves: No cranial nerve deficit.   Psychiatric:         Mood and Affect: Mood normal.         Behavior: Behavior normal.       Past Medical History:   Diagnosis Date    Adverse effect of anesthesia     in 2008 \"throat closes up\"\"anxiety\" ?laryngospasm, kept in ICU. Pt states no problems currently 2018.     Anemia     Anesthesia     in 2008 \"throat closes up\"\"anxiety\" ?laryngospasm, kept in ICU. Pt states no problems currently 2018.     Arthritis     right shoulder, hands    Bowel habit changes More frequent    Cataract 2022    Cigarette smoker quit 2013    Dental disorder     missing teeth; Partial plate on top    depression 08/30/2016    denies depression, states has anxiety and panic attacks    Diabetes mellitus type 1 (HCC) 1989    insulin    Dialysis patient (HCC) Short time due to a kidney injury from a infection    Encounter for long-term (current) use of insulin (HCC) 09/25/2013    Heart burn     High cholesterol     Hypertension     Hypothyroidism, postsurgical 1970    Indigestion     Infectious disease      had hepatitis C, tested neg.    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) 06/10/2015     Social History     Socioeconomic History    Marital status:      Spouse name: Not on file    Number of children: Not on file    Years of education: Not on file    Highest education level: Not on file   Occupational History    Not on file   Tobacco Use    Smoking status: Every Day     Current packs/day: 0.50     Average packs/day: 0.5 packs/day for 30.0 years (15.0 ttl pk-yrs)     Types: Cigarettes    Smokeless tobacco: Never   Vaping Use    Vaping Use: Never used   Substance and Sexual Activity    Alcohol use: Not Currently    Drug use: Yes     Types: Inhaled, Oral, Marijuana     Comment: Marijuana - Occasional; edibles    Sexual activity: Not Currently   Other Topics Concern    Not on file   Social History Narrative    Not on file     Social Determinants of Health     Financial Resource Strain: Low Risk  (11/1/2021)    Overall Financial Resource Strain (CARDIA)     Difficulty of Paying Living Expenses: Not very hard   Food Insecurity: No Food Insecurity (11/1/2021)    Hunger Vital Sign     Worried About Running Out of Food in the Last Year: Never true     Ran Out of Food in the Last Year: Never true   Transportation Needs: No Transportation Needs (11/1/2021)    PRAPARE - Transportation     Lack of Transportation (Medical): No     Lack of Transportation (Non-Medical): No   Physical Activity: Inactive (6/23/2020)    Exercise Vital Sign     Days of Exercise per Week: 0 days     Minutes of Exercise per Session: 0 min   Stress: No Stress Concern Present (6/23/2020)    [employer] [place] [employer] - Occupational Stress Questionnaire     Feeling of Stress : Not at all   Social Connections: " Moderately Isolated (6/23/2020)    Social Connection and Isolation Panel [NHANES]     Frequency of Communication with Friends and Family: More than three times a week     Frequency of Social Gatherings with Friends and Family: More than three times a week     Attends [employer] Services: Never     Active Member of Clubs or Organizations: No     Attends Club or Organization Meetings: Never     Marital Status:    Intimate Partner Violence: Not At Risk (6/23/2020)    Humiliation, Afraid, Rape, and Kick questionnaire     Fear of Current or Ex-Partner: No     Emotionally Abused: No     Physically Abused: No     Sexually Abused: No   Housing Stability: Not on file     Family History   Problem Relation Age of Onset    Hypertension Mother     Cancer Father      Recent Labs     03/03/23  1017 03/09/23  1147 07/18/23 2016 08/14/23  1316 08/17/23  0850 09/18/23  1447 02/12/24  1245 02/12/24  1246   ALBUMIN 3.7   < > 2.2*  --  3.2  --  2.7*  --    HDL 83  --   --   --  145  --   --   --    TRIGLYCERIDE 186*  --   --   --  52  --   --   --    SODIUM 137   < > 137   < > 139 134* 134* 135   POTASSIUM 4.3   < > 4.1   < > 4.3 4.9 4.7 4.7   CHLORIDE 103   < > 105   < > 103 102 100 100   CO2 21   < > 20   < > 25 20 21 21   BUN 26*   < > 20   < > 25* 45* 32* 33*   CREATININE 1.31   < > 1.33   < > 1.43* 1.80* 2.36* 2.22*   PHOSPHORUS 4.1  --   --   --   --   --   --   --     < > = values in this interval not displayed.                             Assessment & Plan        1. Essential hypertension  Uncontrolled   patient was advised to be low-sodium diet  Increase furosemide 80 mg twice daily, patient was advised about the potential side effects    2. Stage 4 chronic kidney disease (HCC)  Slowly progressing  No uremic symptoms  Renal dose of medication  Avoid nephrotoxins  Continue same medication regimen  Patient was advised to call us if symptoms worsen  Predialysis education    3. Type 1 diabetes mellitus on insulin therapy  ([facility])  Continue to optimize diabetes control for hemoglobin A1c below 7%    4. Proteinuria, unspecified type    5. Edema, unspecified type  Low-sodium diet  Increase furosemide dose    6. Tobacco abuse counseling  I spent 3 minutes discussing the need for smoking/tobacco cessation. We discussed measures for quitting including replacements such as nicotine gum/nicotine patch

## 2024-02-16 ENCOUNTER — PATIENT MESSAGE (OUTPATIENT)
Dept: HEALTH INFORMATION MANAGEMENT | Facility: OTHER | Age: 69
End: 2024-02-16

## 2024-02-20 ENCOUNTER — HOSPITAL ENCOUNTER (OUTPATIENT)
Dept: LAB | Facility: MEDICAL CENTER | Age: 69
End: 2024-02-20
Payer: MEDICARE

## 2024-02-20 LAB
BUN SERPL-MCNC: 38 MG/DL (ref 8–22)
CREAT SERPL-MCNC: 2.24 MG/DL (ref 0.5–1.4)
GFR SERPLBLD CREATININE-BSD FMLA CKD-EPI: 23 ML/MIN/1.73 M 2

## 2024-02-20 PROCEDURE — 82565 ASSAY OF CREATININE: CPT

## 2024-02-20 PROCEDURE — 84520 ASSAY OF UREA NITROGEN: CPT

## 2024-02-20 PROCEDURE — 36415 COLL VENOUS BLD VENIPUNCTURE: CPT

## 2024-02-23 ENCOUNTER — HOSPITAL ENCOUNTER (OUTPATIENT)
Dept: RADIOLOGY | Facility: MEDICAL CENTER | Age: 69
End: 2024-02-23
Payer: MEDICARE

## 2024-02-23 DIAGNOSIS — R10.9 ABDOMINAL PAIN, UNSPECIFIED ABDOMINAL LOCATION: ICD-10-CM

## 2024-02-23 DIAGNOSIS — R11.10 VOMITING, UNSPECIFIED VOMITING TYPE, UNSPECIFIED WHETHER NAUSEA PRESENT: ICD-10-CM

## 2024-02-23 DIAGNOSIS — K74.60 HEPATIC CIRRHOSIS, UNSPECIFIED HEPATIC CIRRHOSIS TYPE, UNSPECIFIED WHETHER ASCITES PRESENT (HCC): ICD-10-CM

## 2024-02-23 DIAGNOSIS — K74.3 PRIMARY BILIARY CHOLANGITIS (HCC): ICD-10-CM

## 2024-02-23 PROCEDURE — 74177 CT ABD & PELVIS W/CONTRAST: CPT

## 2024-02-23 PROCEDURE — 700117 HCHG RX CONTRAST REV CODE 255

## 2024-02-23 RX ADMIN — IOHEXOL 25 ML: 240 INJECTION, SOLUTION INTRATHECAL; INTRAVASCULAR; INTRAVENOUS; ORAL at 09:36

## 2024-02-23 RX ADMIN — IOHEXOL 100 ML: 350 INJECTION, SOLUTION INTRAVENOUS at 12:05

## 2024-02-27 ENCOUNTER — APPOINTMENT (OUTPATIENT)
Dept: RADIOLOGY | Facility: MEDICAL CENTER | Age: 69
DRG: 683 | End: 2024-02-27
Attending: EMERGENCY MEDICINE
Payer: MEDICARE

## 2024-02-27 ENCOUNTER — HOSPITAL ENCOUNTER (INPATIENT)
Facility: MEDICAL CENTER | Age: 69
LOS: 2 days | DRG: 683 | End: 2024-03-01
Attending: EMERGENCY MEDICINE | Admitting: HOSPITALIST
Payer: MEDICARE

## 2024-02-27 ENCOUNTER — TELEPHONE (OUTPATIENT)
Dept: NEPHROLOGY | Facility: MEDICAL CENTER | Age: 69
End: 2024-02-27
Payer: MEDICARE

## 2024-02-27 DIAGNOSIS — R60.9 EDEMA, UNSPECIFIED TYPE: ICD-10-CM

## 2024-02-27 DIAGNOSIS — R42 DIZZINESS: ICD-10-CM

## 2024-02-27 DIAGNOSIS — R55 SYNCOPE, UNSPECIFIED SYNCOPE TYPE: ICD-10-CM

## 2024-02-27 DIAGNOSIS — N28.9 RENAL DYSFUNCTION: ICD-10-CM

## 2024-02-27 LAB — EKG IMPRESSION: NORMAL

## 2024-02-27 PROCEDURE — 93005 ELECTROCARDIOGRAM TRACING: CPT

## 2024-02-27 PROCEDURE — 93005 ELECTROCARDIOGRAM TRACING: CPT | Performed by: EMERGENCY MEDICINE

## 2024-02-27 PROCEDURE — 83690 ASSAY OF LIPASE: CPT

## 2024-02-27 PROCEDURE — 36415 COLL VENOUS BLD VENIPUNCTURE: CPT

## 2024-02-27 PROCEDURE — 84439 ASSAY OF FREE THYROXINE: CPT

## 2024-02-27 PROCEDURE — 84443 ASSAY THYROID STIM HORMONE: CPT

## 2024-02-27 PROCEDURE — 84484 ASSAY OF TROPONIN QUANT: CPT

## 2024-02-27 PROCEDURE — 96372 THER/PROPH/DIAG INJ SC/IM: CPT

## 2024-02-27 PROCEDURE — 99285 EMERGENCY DEPT VISIT HI MDM: CPT

## 2024-02-27 PROCEDURE — 94760 N-INVAS EAR/PLS OXIMETRY 1: CPT

## 2024-02-27 PROCEDURE — 0241U HCHG SARS-COV-2 COVID-19 NFCT DS RESP RNA 4 TRGT MIC: CPT

## 2024-02-27 PROCEDURE — 85025 COMPLETE CBC W/AUTO DIFF WBC: CPT

## 2024-02-27 PROCEDURE — 71045 X-RAY EXAM CHEST 1 VIEW: CPT

## 2024-02-27 PROCEDURE — 84100 ASSAY OF PHOSPHORUS: CPT

## 2024-02-27 PROCEDURE — 80053 COMPREHEN METABOLIC PANEL: CPT

## 2024-02-27 PROCEDURE — 85610 PROTHROMBIN TIME: CPT

## 2024-02-27 PROCEDURE — 83735 ASSAY OF MAGNESIUM: CPT

## 2024-02-27 ASSESSMENT — FIBROSIS 4 INDEX: FIB4 SCORE: 2.01

## 2024-02-27 NOTE — TELEPHONE ENCOUNTER
Pt stated she can not walk without passing out, she gets lightheaded and dizzy, and has swelling in face and knees down, low urine output and overall feels unwell, would like to know if she should go to ER

## 2024-02-28 PROBLEM — N18.4 ACUTE RENAL FAILURE SUPERIMPOSED ON STAGE 4 CHRONIC KIDNEY DISEASE, UNSPECIFIED ACUTE RENAL FAILURE TYPE (HCC): Status: ACTIVE | Noted: 2024-02-28

## 2024-02-28 PROBLEM — N17.9 ACUTE RENAL FAILURE SUPERIMPOSED ON STAGE 4 CHRONIC KIDNEY DISEASE, UNSPECIFIED ACUTE RENAL FAILURE TYPE (HCC): Status: ACTIVE | Noted: 2024-02-28

## 2024-02-28 LAB
ALBUMIN SERPL BCP-MCNC: 3 G/DL (ref 3.2–4.9)
ALBUMIN/GLOB SERPL: 0.5 G/DL
ALP SERPL-CCNC: 625 U/L (ref 30–99)
ALT SERPL-CCNC: 54 U/L (ref 2–50)
AMMONIA PLAS-SCNC: 18 UMOL/L (ref 11–45)
ANION GAP SERPL CALC-SCNC: 16 MMOL/L (ref 7–16)
APPEARANCE UR: CLEAR
AST SERPL-CCNC: 108 U/L (ref 12–45)
BACTERIA #/AREA URNS HPF: NEGATIVE /HPF
BASOPHILS # BLD AUTO: 1.9 % (ref 0–1.8)
BASOPHILS # BLD: 0.15 K/UL (ref 0–0.12)
BILIRUB SERPL-MCNC: 0.8 MG/DL (ref 0.1–1.5)
BILIRUB UR QL STRIP.AUTO: ABNORMAL
BUN SERPL-MCNC: 51 MG/DL (ref 8–22)
CALCIUM ALBUM COR SERPL-MCNC: 9.3 MG/DL (ref 8.5–10.5)
CALCIUM SERPL-MCNC: 8.5 MG/DL (ref 8.5–10.5)
CHLORIDE SERPL-SCNC: 97 MMOL/L (ref 96–112)
CHLORIDE UR-SCNC: <20 MMOL/L
CO2 SERPL-SCNC: 22 MMOL/L (ref 20–33)
COLOR UR: ABNORMAL
CREAT SERPL-MCNC: 3.44 MG/DL (ref 0.5–1.4)
CREAT UR-MCNC: 82.41 MG/DL
EOSINOPHIL # BLD AUTO: 0.14 K/UL (ref 0–0.51)
EOSINOPHIL NFR BLD: 1.8 % (ref 0–6.9)
EPI CELLS #/AREA URNS HPF: ABNORMAL /HPF
ERYTHROCYTE [DISTWIDTH] IN BLOOD BY AUTOMATED COUNT: 57.1 FL (ref 35.9–50)
FLUAV RNA SPEC QL NAA+PROBE: NEGATIVE
FLUBV RNA SPEC QL NAA+PROBE: NEGATIVE
GFR SERPLBLD CREATININE-BSD FMLA CKD-EPI: 14 ML/MIN/1.73 M 2
GLOBULIN SER CALC-MCNC: 5.6 G/DL (ref 1.9–3.5)
GLUCOSE BLD STRIP.AUTO-MCNC: 142 MG/DL (ref 65–99)
GLUCOSE BLD STRIP.AUTO-MCNC: 204 MG/DL (ref 65–99)
GLUCOSE BLD STRIP.AUTO-MCNC: 278 MG/DL (ref 65–99)
GLUCOSE SERPL-MCNC: 190 MG/DL (ref 65–99)
GLUCOSE UR STRIP.AUTO-MCNC: 100 MG/DL
HCT VFR BLD AUTO: 28.8 % (ref 37–47)
HGB BLD-MCNC: 9.1 G/DL (ref 12–16)
HYALINE CASTS #/AREA URNS LPF: ABNORMAL /LPF
IMM GRANULOCYTES # BLD AUTO: 0.02 K/UL (ref 0–0.11)
IMM GRANULOCYTES NFR BLD AUTO: 0.3 % (ref 0–0.9)
INR PPP: 0.95 (ref 0.87–1.13)
KETONES UR STRIP.AUTO-MCNC: NEGATIVE MG/DL
LEUKOCYTE ESTERASE UR QL STRIP.AUTO: NEGATIVE
LIPASE SERPL-CCNC: 14 U/L (ref 11–82)
LYMPHOCYTES # BLD AUTO: 0.83 K/UL (ref 1–4.8)
LYMPHOCYTES NFR BLD: 10.8 % (ref 22–41)
MAGNESIUM SERPL-MCNC: 2.6 MG/DL (ref 1.5–2.5)
MCH RBC QN AUTO: 28.7 PG (ref 27–33)
MCHC RBC AUTO-ENTMCNC: 31.6 G/DL (ref 32.2–35.5)
MCV RBC AUTO: 90.9 FL (ref 81.4–97.8)
MICRO URNS: ABNORMAL
MONOCYTES # BLD AUTO: 0.92 K/UL (ref 0–0.85)
MONOCYTES NFR BLD AUTO: 11.9 % (ref 0–13.4)
NEUTROPHILS # BLD AUTO: 5.65 K/UL (ref 1.82–7.42)
NEUTROPHILS NFR BLD: 73.3 % (ref 44–72)
NITRITE UR QL STRIP.AUTO: NEGATIVE
NRBC # BLD AUTO: 0 K/UL
NRBC BLD-RTO: 0 /100 WBC (ref 0–0.2)
PH UR STRIP.AUTO: 5.5 [PH] (ref 5–8)
PHOSPHATE SERPL-MCNC: 7.1 MG/DL (ref 2.5–4.5)
PLATELET # BLD AUTO: 308 K/UL (ref 164–446)
PMV BLD AUTO: 10.9 FL (ref 9–12.9)
POTASSIUM SERPL-SCNC: 4.4 MMOL/L (ref 3.6–5.5)
POTASSIUM UR-SCNC: 56 MMOL/L
PROT SERPL-MCNC: 8.6 G/DL (ref 6–8.2)
PROT UR QL STRIP: 300 MG/DL
PROT UR-MCNC: 557 MG/DL (ref 0–15)
PROTHROMBIN TIME: 12.8 SEC (ref 12–14.6)
RBC # BLD AUTO: 3.17 M/UL (ref 4.2–5.4)
RBC # URNS HPF: ABNORMAL /HPF
RBC UR QL AUTO: ABNORMAL
RSV RNA SPEC QL NAA+PROBE: NEGATIVE
SARS-COV-2 RNA RESP QL NAA+PROBE: NOTDETECTED
SODIUM SERPL-SCNC: 135 MMOL/L (ref 135–145)
SODIUM UR-SCNC: 25 MMOL/L
SP GR UR STRIP.AUTO: 1.02
SPECIMEN SOURCE: NORMAL
T4 FREE SERPL-MCNC: 1.15 NG/DL (ref 0.93–1.7)
TROPONIN T SERPL-MCNC: 110 NG/L (ref 6–19)
TROPONIN T SERPL-MCNC: 113 NG/L (ref 6–19)
TSH SERPL DL<=0.005 MIU/L-ACNC: 30.6 UIU/ML (ref 0.38–5.33)
UROBILINOGEN UR STRIP.AUTO-MCNC: 1 MG/DL
WBC # BLD AUTO: 7.7 K/UL (ref 4.8–10.8)
WBC #/AREA URNS HPF: ABNORMAL /HPF

## 2024-02-28 PROCEDURE — 700102 HCHG RX REV CODE 250 W/ 637 OVERRIDE(OP): Performed by: HOSPITALIST

## 2024-02-28 PROCEDURE — 82962 GLUCOSE BLOOD TEST: CPT | Mod: 91

## 2024-02-28 PROCEDURE — 84133 ASSAY OF URINE POTASSIUM: CPT

## 2024-02-28 PROCEDURE — 82570 ASSAY OF URINE CREATININE: CPT

## 2024-02-28 PROCEDURE — 82140 ASSAY OF AMMONIA: CPT

## 2024-02-28 PROCEDURE — 84484 ASSAY OF TROPONIN QUANT: CPT

## 2024-02-28 PROCEDURE — 36415 COLL VENOUS BLD VENIPUNCTURE: CPT

## 2024-02-28 PROCEDURE — A9270 NON-COVERED ITEM OR SERVICE: HCPCS | Performed by: HOSPITALIST

## 2024-02-28 PROCEDURE — 770020 HCHG ROOM/CARE - TELE (206)

## 2024-02-28 PROCEDURE — 700105 HCHG RX REV CODE 258: Performed by: HOSPITALIST

## 2024-02-28 PROCEDURE — 84156 ASSAY OF PROTEIN URINE: CPT

## 2024-02-28 PROCEDURE — A9270 NON-COVERED ITEM OR SERVICE: HCPCS | Performed by: STUDENT IN AN ORGANIZED HEALTH CARE EDUCATION/TRAINING PROGRAM

## 2024-02-28 PROCEDURE — 84300 ASSAY OF URINE SODIUM: CPT

## 2024-02-28 PROCEDURE — 700111 HCHG RX REV CODE 636 W/ 250 OVERRIDE (IP): Performed by: STUDENT IN AN ORGANIZED HEALTH CARE EDUCATION/TRAINING PROGRAM

## 2024-02-28 PROCEDURE — 82436 ASSAY OF URINE CHLORIDE: CPT

## 2024-02-28 PROCEDURE — 700102 HCHG RX REV CODE 250 W/ 637 OVERRIDE(OP): Performed by: STUDENT IN AN ORGANIZED HEALTH CARE EDUCATION/TRAINING PROGRAM

## 2024-02-28 PROCEDURE — 81001 URINALYSIS AUTO W/SCOPE: CPT

## 2024-02-28 PROCEDURE — 99223 1ST HOSP IP/OBS HIGH 75: CPT | Mod: AI | Performed by: HOSPITALIST

## 2024-02-28 RX ORDER — ATORVASTATIN CALCIUM 40 MG/1
40 TABLET, FILM COATED ORAL NIGHTLY
Status: DISCONTINUED | OUTPATIENT
Start: 2024-02-28 | End: 2024-03-01 | Stop reason: HOSPADM

## 2024-02-28 RX ORDER — HYDROMORPHONE HYDROCHLORIDE 1 MG/ML
0.5 INJECTION, SOLUTION INTRAMUSCULAR; INTRAVENOUS; SUBCUTANEOUS EVERY 4 HOURS PRN
Status: DISCONTINUED | OUTPATIENT
Start: 2024-02-28 | End: 2024-03-01 | Stop reason: HOSPADM

## 2024-02-28 RX ORDER — METOPROLOL SUCCINATE 100 MG/1
100 TABLET, EXTENDED RELEASE ORAL EVERY MORNING
Status: DISCONTINUED | OUTPATIENT
Start: 2024-02-28 | End: 2024-03-01

## 2024-02-28 RX ORDER — TRAZODONE HYDROCHLORIDE 100 MG/1
150 TABLET ORAL
Status: DISCONTINUED | OUTPATIENT
Start: 2024-02-28 | End: 2024-03-01 | Stop reason: HOSPADM

## 2024-02-28 RX ORDER — LIOTHYRONINE SODIUM 5 UG/1
5 TABLET ORAL DAILY
Status: DISCONTINUED | OUTPATIENT
Start: 2024-02-28 | End: 2024-03-01

## 2024-02-28 RX ORDER — AMLODIPINE BESYLATE 5 MG/1
5 TABLET ORAL EVERY EVENING
Status: DISCONTINUED | OUTPATIENT
Start: 2024-02-28 | End: 2024-03-01

## 2024-02-28 RX ORDER — ONDANSETRON 4 MG/1
4 TABLET, ORALLY DISINTEGRATING ORAL EVERY 4 HOURS PRN
Status: DISCONTINUED | OUTPATIENT
Start: 2024-02-28 | End: 2024-03-01 | Stop reason: HOSPADM

## 2024-02-28 RX ORDER — VENLAFAXINE HYDROCHLORIDE 75 MG/1
150 CAPSULE, EXTENDED RELEASE ORAL DAILY
Status: DISCONTINUED | OUTPATIENT
Start: 2024-02-28 | End: 2024-03-01 | Stop reason: HOSPADM

## 2024-02-28 RX ORDER — INSULIN GLARGINE 300 U/ML
28-35 INJECTION, SOLUTION SUBCUTANEOUS
Status: ON HOLD | COMMUNITY
End: 2024-03-01

## 2024-02-28 RX ORDER — INSULIN ASPART INJECTION 100 [IU]/ML
2-10 INJECTION, SOLUTION SUBCUTANEOUS
COMMUNITY
End: 2024-03-20

## 2024-02-28 RX ORDER — AMOXICILLIN 250 MG
1 CAPSULE ORAL DAILY
COMMUNITY

## 2024-02-28 RX ORDER — LEVOTHYROXINE SODIUM 0.12 MG/1
250 TABLET ORAL
Status: DISCONTINUED | OUTPATIENT
Start: 2024-02-28 | End: 2024-03-01 | Stop reason: HOSPADM

## 2024-02-28 RX ORDER — FAMOTIDINE 20 MG/1
20 TABLET, FILM COATED ORAL DAILY
Status: DISCONTINUED | OUTPATIENT
Start: 2024-02-28 | End: 2024-03-01 | Stop reason: HOSPADM

## 2024-02-28 RX ORDER — HYDROCODONE BITARTRATE AND ACETAMINOPHEN 5; 325 MG/1; MG/1
1 TABLET ORAL EVERY 6 HOURS PRN
Status: DISCONTINUED | OUTPATIENT
Start: 2024-02-28 | End: 2024-03-01 | Stop reason: HOSPADM

## 2024-02-28 RX ORDER — ONDANSETRON 2 MG/ML
4 INJECTION INTRAMUSCULAR; INTRAVENOUS EVERY 4 HOURS PRN
Status: DISCONTINUED | OUTPATIENT
Start: 2024-02-28 | End: 2024-03-01 | Stop reason: HOSPADM

## 2024-02-28 RX ORDER — DEXTROSE MONOHYDRATE 25 G/50ML
25 INJECTION, SOLUTION INTRAVENOUS
Status: DISCONTINUED | OUTPATIENT
Start: 2024-02-28 | End: 2024-03-01

## 2024-02-28 RX ORDER — SODIUM CHLORIDE, SODIUM LACTATE, POTASSIUM CHLORIDE, CALCIUM CHLORIDE 600; 310; 30; 20 MG/100ML; MG/100ML; MG/100ML; MG/100ML
INJECTION, SOLUTION INTRAVENOUS CONTINUOUS
Status: DISCONTINUED | OUTPATIENT
Start: 2024-02-28 | End: 2024-03-01

## 2024-02-28 RX ORDER — URSODIOL 300 MG/1
300 CAPSULE ORAL 3 TIMES DAILY
Status: DISCONTINUED | OUTPATIENT
Start: 2024-02-28 | End: 2024-03-01 | Stop reason: HOSPADM

## 2024-02-28 RX ORDER — NICOTINE 21 MG/24HR
21 PATCH, TRANSDERMAL 24 HOURS TRANSDERMAL
Status: DISCONTINUED | OUTPATIENT
Start: 2024-02-28 | End: 2024-03-01 | Stop reason: HOSPADM

## 2024-02-28 RX ADMIN — VENLAFAXINE HYDROCHLORIDE 150 MG: 75 CAPSULE, EXTENDED RELEASE ORAL at 09:56

## 2024-02-28 RX ADMIN — FAMOTIDINE 20 MG: 20 TABLET, FILM COATED ORAL at 09:01

## 2024-02-28 RX ADMIN — TRAZODONE HYDROCHLORIDE 150 MG: 100 TABLET ORAL at 21:52

## 2024-02-28 RX ADMIN — URSODIOL 300 MG: 300 CAPSULE ORAL at 09:56

## 2024-02-28 RX ADMIN — NICOTINE POLACRILEX 2 MG: 2 GUM, CHEWING BUCCAL at 17:07

## 2024-02-28 RX ADMIN — ATORVASTATIN CALCIUM 40 MG: 40 TABLET, FILM COATED ORAL at 21:51

## 2024-02-28 RX ADMIN — URSODIOL 300 MG: 300 CAPSULE ORAL at 19:50

## 2024-02-28 RX ADMIN — URSODIOL 300 MG: 300 CAPSULE ORAL at 12:28

## 2024-02-28 RX ADMIN — NICOTINE TRANSDERMAL SYSTEM 21 MG: 21 PATCH, EXTENDED RELEASE TRANSDERMAL at 17:07

## 2024-02-28 RX ADMIN — LIOTHYRONINE SODIUM 5 MCG: 5 TABLET ORAL at 09:55

## 2024-02-28 RX ADMIN — METOPROLOL SUCCINATE 100 MG: 100 TABLET, EXTENDED RELEASE ORAL at 12:24

## 2024-02-28 RX ADMIN — INSULIN GLARGINE-YFGN 25 UNITS: 100 INJECTION, SOLUTION SUBCUTANEOUS at 22:07

## 2024-02-28 RX ADMIN — LEVOTHYROXINE SODIUM 250 MCG: 0.12 TABLET ORAL at 09:55

## 2024-02-28 RX ADMIN — INSULIN HUMAN 8 UNITS: 100 INJECTION, SOLUTION PARENTERAL at 22:03

## 2024-02-28 RX ADMIN — AMLODIPINE BESYLATE 5 MG: 5 TABLET ORAL at 17:07

## 2024-02-28 RX ADMIN — INSULIN HUMAN 3 UNITS: 100 INJECTION, SOLUTION PARENTERAL at 09:01

## 2024-02-28 RX ADMIN — INSULIN HUMAN 5 UNITS: 100 INJECTION, SOLUTION PARENTERAL at 17:12

## 2024-02-28 RX ADMIN — HYDROMORPHONE HYDROCHLORIDE 0.5 MG: 1 INJECTION, SOLUTION INTRAMUSCULAR; INTRAVENOUS; SUBCUTANEOUS at 19:54

## 2024-02-28 RX ADMIN — SODIUM CHLORIDE, POTASSIUM CHLORIDE, SODIUM LACTATE AND CALCIUM CHLORIDE: 600; 310; 30; 20 INJECTION, SOLUTION INTRAVENOUS at 05:59

## 2024-02-28 ASSESSMENT — ENCOUNTER SYMPTOMS
BRUISES/BLEEDS EASILY: 0
NECK PAIN: 0
STRIDOR: 0
DOUBLE VISION: 0
WEAKNESS: 0
COUGH: 1
ORTHOPNEA: 0
PALPITATIONS: 0
MYALGIAS: 0
FEVER: 0
ABDOMINAL PAIN: 0
PND: 0
TINGLING: 0
NAUSEA: 1
SHORTNESS OF BREATH: 1
TREMORS: 0
SINUS PAIN: 0
PHOTOPHOBIA: 0
POLYDIPSIA: 0
FALLS: 0
EYE PAIN: 0
CLAUDICATION: 0
SPUTUM PRODUCTION: 0
HALLUCINATIONS: 0
HEMOPTYSIS: 0
DIZZINESS: 1
BACK PAIN: 0
SORE THROAT: 0
WHEEZING: 0
FLANK PAIN: 0
VOMITING: 1
BLOOD IN STOOL: 0
CHILLS: 0
HEARTBURN: 0
DIAPHORESIS: 0
HEADACHES: 0
BLURRED VISION: 0
DEPRESSION: 0
CONSTIPATION: 0
DIARRHEA: 0

## 2024-02-28 ASSESSMENT — COGNITIVE AND FUNCTIONAL STATUS - GENERAL
WALKING IN HOSPITAL ROOM: A LITTLE
SUGGESTED CMS G CODE MODIFIER MOBILITY: CJ
MOBILITY SCORE: 22
DAILY ACTIVITIY SCORE: 24
HELP NEEDED FOR BATHING: A LITTLE
WALKING IN HOSPITAL ROOM: A LITTLE
SUGGESTED CMS G CODE MODIFIER DAILY ACTIVITY: CJ
CLIMB 3 TO 5 STEPS WITH RAILING: A LITTLE
TOILETING: A LITTLE
SUGGESTED CMS G CODE MODIFIER MOBILITY: CJ
SUGGESTED CMS G CODE MODIFIER DAILY ACTIVITY: CH
DAILY ACTIVITIY SCORE: 22
CLIMB 3 TO 5 STEPS WITH RAILING: A LITTLE
MOBILITY SCORE: 22

## 2024-02-28 ASSESSMENT — LIFESTYLE VARIABLES
HOW MANY TIMES IN THE PAST YEAR HAVE YOU HAD 5 OR MORE DRINKS IN A DAY: 0
AVERAGE NUMBER OF DAYS PER WEEK YOU HAVE A DRINK CONTAINING ALCOHOL: 0
HAVE PEOPLE ANNOYED YOU BY CRITICIZING YOUR DRINKING: NO
EVER HAD A DRINK FIRST THING IN THE MORNING TO STEADY YOUR NERVES TO GET RID OF A HANGOVER: NO
HAVE YOU EVER FELT YOU SHOULD CUT DOWN ON YOUR DRINKING: NO
EVER FELT BAD OR GUILTY ABOUT YOUR DRINKING: NO
TOTAL SCORE: 0
TOTAL SCORE: 0
CONSUMPTION TOTAL: NEGATIVE
TOTAL SCORE: 0
SUBSTANCE_ABUSE: 0
ON A TYPICAL DAY WHEN YOU DRINK ALCOHOL HOW MANY DRINKS DO YOU HAVE: 0
DOES PATIENT WANT TO TALK TO SOMEONE ABOUT QUITTING: NO
DOES PATIENT WANT TO STOP DRINKING: YES
ALCOHOL_USE: NO

## 2024-02-28 ASSESSMENT — PATIENT HEALTH QUESTIONNAIRE - PHQ9
4. FEELING TIRED OR HAVING LITTLE ENERGY: NEARLY EVERY DAY
8. MOVING OR SPEAKING SO SLOWLY THAT OTHER PEOPLE COULD HAVE NOTICED. OR THE OPPOSITE, BEING SO FIGETY OR RESTLESS THAT YOU HAVE BEEN MOVING AROUND A LOT MORE THAN USUAL: SEVERAL DAYS
SUM OF ALL RESPONSES TO PHQ9 QUESTIONS 1 AND 2: 3
5. POOR APPETITE OR OVEREATING: NEARLY EVERY DAY
3. TROUBLE FALLING OR STAYING ASLEEP OR SLEEPING TOO MUCH: NEARLY EVERY DAY
6. FEELING BAD ABOUT YOURSELF - OR THAT YOU ARE A FAILURE OR HAVE LET YOURSELF OR YOUR FAMILY DOWN: NEARLY EVERY DAY
SUM OF ALL RESPONSES TO PHQ QUESTIONS 1-9: 21
1. LITTLE INTEREST OR PLEASURE IN DOING THINGS: NOT AT ALL
9. THOUGHTS THAT YOU WOULD BE BETTER OFF DEAD, OR OF HURTING YOURSELF: MORE THAN HALF THE DAYS
2. FEELING DOWN, DEPRESSED, IRRITABLE, OR HOPELESS: NEARLY EVERY DAY
7. TROUBLE CONCENTRATING ON THINGS, SUCH AS READING THE NEWSPAPER OR WATCHING TELEVISION: NEARLY EVERY DAY

## 2024-02-28 ASSESSMENT — COPD QUESTIONNAIRES
HAVE YOU SMOKED AT LEAST 100 CIGARETTES IN YOUR ENTIRE LIFE: YES
COPD SCREENING SCORE: 6
DO YOU EVER COUGH UP ANY MUCUS OR PHLEGM?: NO/ONLY WITH OCCASIONAL COLDS OR INFECTIONS
DURING THE PAST 4 WEEKS HOW MUCH DID YOU FEEL SHORT OF BREATH: SOME OF THE TIME

## 2024-02-28 ASSESSMENT — FIBROSIS 4 INDEX: FIB4 SCORE: 3.15

## 2024-02-28 NOTE — ED NOTES
"This RN ambulated pt. Pt verbalized \" I feel dizzy\" and unsteady on her feet. ERP made aware.   " neck supple, no meningismus, normal gait, normal coordination

## 2024-02-28 NOTE — ED NOTES
Med rec complete per patient  Allergies reviewed.   Outpatient antibiotics in the last 30 days? No   Anticoagulants taken in the last 14 days? No     Pharm[facility] [place]

## 2024-02-28 NOTE — ED NOTES
Pt is sleeping in gurney with unlabored breathing. Gurney in low position. Connected to cardiac monitor. On room air.

## 2024-02-28 NOTE — ASSESSMENT & PLAN NOTE
Most likely prerenal since it improved with volume.  Will get orthostatics and change LR for  cc/h.  Strict I's and O's.  Needed hemodialysis in admission couple of years ago but not scheduled.  Followed outpatient by Dr. Najjar.  Avoid IV contrast/nephrotoxins/NSAIDs  Dose adjust meds for decreased GFR

## 2024-02-28 NOTE — H&P
"Hospital Medicine History & Physical Note    Date of Service  2/28/2024    Primary Care Physician  Jarrell Yates M.D.    Consultants  Consult nephrology in a.m.    Specialist Names: =    Code Status  DNAR/DNI    Chief Complaint  Chief Complaint   Patient presents with    Dizziness     Pt reports \"every time I stand up I pass out\" today. Pt has hx of renal and liver failure.       History of Presenting Illness  Anu Manuel is a 68 y.o. female who presented 2/27/2024 with past medical history of autoimmune hepatitis, type 1 diabetes, coronary disease with 2 prior stents, hypothyroidism, CKD stage IV, hypertension who presents to the hospital for generalized weakness and presyncope for the past 1 day.  Patient states that she has chronic nausea and vomiting since she was diagnosed with her liver issues.  Since this occurred her kidney started failing as well.  Recently she was evaluated by nephrology who increased her Lasix from 20 mg twice daily to 40 mg twice daily.  Over the past 1 day she has made less urine and complains of increased swelling of her lower extremities.    Chest x-ray interpreted by me found no acute pulmonary process  EKG interpreted by me found sinus bradycardia    I discussed the plan of care with patient.    Review of Systems  Review of Systems   Constitutional:  Negative for chills, diaphoresis, fever and malaise/fatigue.   HENT:  Negative for congestion, ear discharge, ear pain, hearing loss, nosebleeds, sinus pain, sore throat and tinnitus.    Eyes:  Negative for blurred vision, double vision, photophobia and pain.   Respiratory:  Positive for cough and shortness of breath. Negative for hemoptysis, sputum production, wheezing and stridor.    Cardiovascular:  Positive for leg swelling. Negative for chest pain, palpitations, orthopnea, claudication and PND.   Gastrointestinal:  Positive for nausea and vomiting. Negative for abdominal pain, blood in stool, constipation, diarrhea, " heartburn and melena.   Genitourinary:  Negative for dysuria, flank pain, frequency, hematuria and urgency.   Musculoskeletal:  Negative for back pain, falls, joint pain, myalgias and neck pain.   Skin:  Negative for itching and rash.   Neurological:  Positive for dizziness. Negative for tingling, tremors, weakness and headaches.   Endo/Heme/Allergies:  Negative for environmental allergies and polydipsia. Does not bruise/bleed easily.   Psychiatric/Behavioral:  Negative for depression, hallucinations, substance abuse and suicidal ideas.        Past Medical History   has a past medical history of Adverse effect of anesthesia, Anemia, Anesthesia, Arthritis, Bowel habit changes (More frequent), Cataract (2022), Cigarette smoker (quit 2013), Dental disorder, depression (08/30/2016), Diabetes mellitus type 1 (HCC) (1989), Dialysis patient ([facility]) (Short time due to a kidney injury from a infection), Encounter for long-term (current) use of insulin ([facility]) (09/25/2013), Heart burn, High cholesterol, Hypertension, Hypothyroidism, postsurgical (1970), Indigestion, Infectious disease, Jaundice (2021 Liver disease), Joint replacement, Liver disease, Liver failure (HCC), Pain, Polyneuropathy in diabetes(357.2) (06/10/2015), Status post appendectomy, and 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    Surgical History   has a past surgical history that includes hysterectomy, vaginal (2006); thyroid lobectomy (1973); lumpectomy (1976, 2005); cervical disk and fusion anterior (03/12/2008); tonsillectomy (1963); cervical fusion posterior (01/16/2009); hardware removal neuro (01/16/2009); neck exploration (01/16/2009); lumpectomy; lumbar laminectomy diskectomy (Right, 05/10/2016); shoulder decompression arthroscopic (06/17/2008); clavicle distal excision (06/17/2008); shoulder arthroscopy w/ rotator cuff repair (10/09/2008); pr njx aa&/strd tfrml epi lumbar/sacral 1 level  (Right, 2016); spinal cord stimulator (N/A, 10/26/2018); thoracic laminectomy (N/A, 10/26/2018); appendectomy (); pr ins/rplc spi npg/rcvr pocket (N/A, 2019); irrigation & debridement neuro (2020); cath placement (2020); pr ercp,diagnostic (N/A, 10/26/2021); pr upper gi endoscopy,biopsy (N/A, 10/26/2021); pr upper gi endoscopy,diagnosis (N/A, 10/26/2021); peter by laparoscopy (N/A, 10/28/2021); pr ercp,diagnostic (N/A, 2022); other cardiac surgery ( stents inserted); and other abdominal surgery ().     Family History  family history includes Cancer in her father; Hypertension in her mother.   Family history reviewed with patient. There is no family history that is pertinent to the chief complaint.     Social History   reports that she has been smoking cigarettes. She has a 15 pack-year smoking history. She has never used smokeless tobacco. She reports that she does not currently use alcohol. She reports that she does not currently use drugs after having used the following drugs: Inhaled, Oral, and Marijuana.    Allergies  Allergies   Allergen Reactions    Tape Unspecified and Rash     Blisters, paper tape is ok  Other reaction(s): Rash       Medications  Prior to Admission Medications   Prescriptions Last Dose Informant Patient Reported? Taking?   BD PEN NEEDLE SOFIA U/F   No No   Sig: For use with 2-3 daily insulin   Continuous Blood Gluc Sensor (FREESTYLE PARISA 3 SENSOR) [facility]   No No   Si Each every 14 days.   Cyanocobalamin (VITAMIN B-12 ER PO)   Yes No   Sig: Take  by mouth.   Insulin Aspart, w/Niacinamide, (FIASP FLEXTOUCH) 100 UNIT/ML Solution Pen-injector   No No   Sig: Inject 10 Units under the skin 3 times a day before meals.   Insulin Glargine, 1 Unit Dial, (TOUJEO SOLOSTAR) 300 UNIT/ML Solution Pen-injector   No No   Sig: Inject 45 Units under the skin at bedtime.   SYNTHROID 125 MCG Tab   No No   Sig: Take 2 Tablets by mouth every day. Name brand medically  necessare   Vitamin D, Ergocalciferol, 50 MCG (2000 UT) Cap  Patient Yes No   Sig: Take 5,000 Units by mouth every day.   amLODIPine (NORVASC) 5 MG Tab   No No   Sig: Take 1 Tablet by mouth every day.   atorvastatin (LIPITOR) 40 MG Tab   No No   Sig: Take 1 Tablet by mouth every evening.   famotidine (PEPCID) 20 MG Tab   No No   Sig: TAKE 1 TABLET BY MOUTH TWICE A DAY   fenofibrate (TRICOR) 48 MG Tab   Yes No   Sig: TAKE 1 TABLET BY MOUTH EVERY MORNING AFTER A MEAL   furosemide (LASIX) 40 MG Tab   No No   Sig: Take 2 Tablets by mouth 2 times a day.   liothyronine (CYTOMEL) 5 MCG Tab   No No   Sig: Take 1 Tablet by mouth every day.   metoprolol SR (TOPROL XL) 100 MG TABLET SR 24 HR  Patient Yes No   Si mg every morning.   metoprolol SR (TOPROL XL) 50 MG TABLET SR 24 HR  Patient Yes No   Si mg every morning.   omeprazole (PRILOSEC) 40 MG delayed-release capsule   Yes No   Sig: Take 40 mg by mouth every day.   ondansetron (ZOFRAN ODT) 8 MG TABLET DISPERSIBLE   Yes No   Si TABLET ORAL EVERY 8 HOURS FOR NAUSEA/VOMITING   oxycodone (OXY-IR) 15 MG immediate release tablet   Yes No   Sig: Take 15 mg by mouth every four hours as needed for Severe Pain.   potassium chloride (MICRO-K) 10 MEQ capsule   Yes No   Sig: Take 1 Capsule by mouth every morning.   riFAMPin (RIFADINE) 300 MG Cap   Yes No   Sig: Take 300 Capsules by mouth 2 times a day.   traZODone (DESYREL) 100 MG Tab  Patient Yes No   Sig: Take 150 mg by mouth at bedtime.   ursodiol (ACTIGALL) 300 MG Cap  Patient Yes No   Sig: Take 300 mg by mouth 3 times a day.   venlafaxine (EFFEXOR-XR) 150 MG extended-release capsule   Yes No   Sig: Take 150 mg by mouth every day.      Facility-Administered Medications: None       Physical Exam  Temp:  [36.7 °C (98.1 °F)] 36.7 °C (98.1 °F)  Pulse:  [61-74] 61  Resp:  [12-20] 20  BP: ()/(59-88) 168/80  SpO2:  [91 %-96 %] 95 %  Blood Pressure : (!) 168/80   Temperature: 36.7 °C (98.1 °F)   Pulse: 61   Respiration:  20   Pulse Oximetry: 95 %       Physical Exam  Vitals and nursing note reviewed.   Constitutional:       General: She is not in acute distress.     Appearance: Normal appearance. She is not ill-appearing, toxic-appearing or diaphoretic.   HENT:      Head: Normocephalic and atraumatic.      Nose: No congestion or rhinorrhea.      Mouth/Throat:      Pharynx: No oropharyngeal exudate or posterior oropharyngeal erythema.   Eyes:      General: No scleral icterus.  Neck:      Vascular: No carotid bruit or JVD.   Cardiovascular:      Rate and Rhythm: Normal rate and regular rhythm.      Pulses: Normal pulses.      Heart sounds: Normal heart sounds. No murmur heard.     No friction rub. No gallop.   Pulmonary:      Effort: Pulmonary effort is normal. No respiratory distress.      Breath sounds: No stridor. No wheezing, rhonchi or rales.   Abdominal:      General: Abdomen is flat. There is no distension.      Palpations: There is no mass.      Tenderness: There is no abdominal tenderness. There is no left CVA tenderness, guarding or rebound.      Hernia: No hernia is present.   Musculoskeletal:         General: No swelling. Normal range of motion.      Cervical back: No rigidity. No muscular tenderness.      Right lower leg: No edema.      Left lower leg: No edema.   Lymphadenopathy:      Cervical: No cervical adenopathy.   Skin:     General: Skin is warm and dry.      Capillary Refill: Capillary refill takes 2 to 3 seconds.      Coloration: Skin is not jaundiced or pale.      Findings: No bruising or erythema.   Neurological:      Mental Status: She is alert.         Laboratory:  Recent Labs     02/27/24 2329   WBC 7.7   RBC 3.17*   HEMOGLOBIN 9.1*   HEMATOCRIT 28.8*   MCV 90.9   MCH 28.7   MCHC 31.6*   RDW 57.1*   PLATELETCT 308   MPV 10.9     Recent Labs     02/27/24 2329   SODIUM 135   POTASSIUM 4.4   CHLORIDE 97   CO2 22   GLUCOSE 190*   BUN 51*   CREATININE 3.44*   CALCIUM 8.5     Recent Labs     02/27/24 2329  "  ALTSGPT 54*   ASTSGOT 108*   ALKPHOSPHAT 625*   TBILIRUBIN 0.8   LIPASE 14   GLUCOSE 190*     Recent Labs     02/27/24  2329   INR 0.95     No results for input(s): \"NTPROBNP\" in the last 72 hours.      Recent Labs     02/27/24  2329 02/28/24  0127   TROPONINT 113* 110*       Imaging:  DX-CHEST-PORTABLE (1 VIEW)   Final Result         1. No acute cardiopulmonary abnormalities are identified.          X-Ray:  I have personally reviewed the images and compared with prior images.  EKG:  I have personally reviewed the images and compared with prior images.    Assessment/Plan:  Justification for Admission Status  I anticipate this patient will require at least two midnights for appropriate medical management, necessitating inpatient admission because renal failure    Patient will need a Telemetry bed on MEDICAL service .  The need is secondary to renal failure.    * Acute renal failure superimposed on stage 4 chronic kidney disease, unspecified acute renal failure type (HCC)- (present on admission)  Assessment & Plan  Likely prerenal  IV fluid hydration   Monitor BMP and assess response  Avoid IV contrast/nephrotoxins/NSAIDs  Dose adjust meds for decreased GFR    Urine electrolytes    Hepatitis- (present on admission)  Assessment & Plan  Monitor LFTs  Continue home medications    Type 1 diabetes mellitus on insulin therapy (HCC)- (present on admission)  Assessment & Plan  Start on insulin sliding scale with serial Accu-Checks  Check hemoglobin A1c  Hypoglycemic protocol in place  Lantus 45 units    Hypothyroidism, postsurgical- (present on admission)  Assessment & Plan  Continue levothyroxine    Tobacco abuse- (present on admission)  Assessment & Plan  Tobacco cessation education provided for more than 5 minutes.  We discussed the risks of smoking including increased risk of heart disease, stroke, cancer and COPD. We discussed the benefits of quitting smoking. We discussed options of nicotine patch, wellbutrin and " chantix.  The patient has the intention to quit smoking. Nicotine replacement protocol will be provided to the patient.          VTE prophylaxis: SCDs/TEDs

## 2024-02-28 NOTE — ED TRIAGE NOTES
"Chief Complaint   Patient presents with    Dizziness     Pt reports \"every time I stand up I pass out\" today. Pt has hx of renal and liver failure.       Pt to triage via w/c for above complaint. Presents with dizziness and 2 syncopal episodes today pt denies head strike.    Pt back to lobby, educated on triage process and encourage to alert staff of any changes.     Vitals:    02/27/24 2117   BP: 129/68   Pulse: 74   Resp: 15   Temp: 36.7 °C (98.1 °F)   SpO2: 96%           "

## 2024-02-28 NOTE — DISCHARGE PLANNING
HTH/SCP TCN chart review completed. The most current review of medical record, knowledge of pt's PLOF and social support, LACE+ score of 72, 6 clicks scores unavailable.    Pt seen at bedside in ED. Note that pt is to be admitted to telemetry floor per discussion/review. Introduced TCN program. Provided education regarding post acute levels of care. Education provided regarding case management policy for blanket SNF referrals. Discussed HTH/SCP plan benefits. Pt verbalizes understanding.     Pt reports she is hopeful for dc to home with possible HH services if indicated. She reports that she has a walker as well as supplemental 02 (portables and concentrator) and can have portable obtained if needed for transport home. She is unsure of company as home supplemental 02 was recently delivered per pt. Note as above that pt anticipates that if her syncopal/near syncopal episodes are managed, she anticipates ability to dc home. She reports that if medical team/therapy indicates, she is agreeable to CM blanket SNF referral policy for post acute placement and will provide choice to CM from accepting SNF facilities.    No additional provider requests at this time, though PT and OT may be indicated dependent on response to medical management and TCN will monitor for possible consults. Given aforementioned, choice proactively obtained for HH and faxed to [facility]. As above, pt agreeable to CM blanket referral if indicated and will choose from accepting facilities. TCN will continue to follow and collaborate with discharge planning team as additional post acute needs arise. Thank you.     Completed today:  Choice obtained: HH; see above; agreeable to CM blanket SNF referral policy if indicated at time of dc  SCP with non [facility] PCP.

## 2024-02-28 NOTE — ED PROVIDER NOTES
"                                                        ED Provider Note    CHIEF COMPLAINT  Chief Complaint   Patient presents with    Dizziness     Pt reports \"every time I stand up I pass out\" today. Pt has hx of renal and liver failure.        HPI    Primary care provider: Jarrell Yates M.D.   History obtained from: Patient  History limited by: None     Anu Manuel is a 68 y.o. female who presents to the ED complaining of passing out with standing up.  Patient reports that symptoms have been ongoing for the past year and she decided to come in today because \"I pass out when I stand up and I can't be alone.\"  Patient also reports that she has swelling to her face and bilateral lower extremities that has been ongoing for the past year as well.  She reports history of renal failure but has not been on dialysis for several years.  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  She reports that she has chronic vomiting for 3 years and \"nobody can figure out why.\"  She denies fever.  She has had some cough and runny nose.  No recent travels or known ill contacts.  She reports she has constipation.  She denies discomfort with urination but reports decreased urination.  She states that she has COPD and is on 2 L of oxygen at baseline.  She reports that she did feel short of breath earlier but not currently.    REVIEW OF SYSTEMS  Please see HPI for pertinent positives/negatives.  All other systems reviewed and are negative.     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 Pt states no problems currently 2018.     Anemia     Anesthesia     in 2008 \"throat closes up\"\"anxiety\" ?laryngospasm, kept in ICU. Pt states no problems currently 2018.     Arthritis     right shoulder, hands    Bowel habit changes More frequent    Cataract 2022    Cigarette smoker quit 2013    Dental disorder     missing teeth; Partial plate on top    Dialysis patient (HCC) Short time due to a kidney injury from a infection    Heart burn     High cholesterol     Hypertension     Indigestion     Infectious disease      had hepatitis C, tested neg.    Jaundice 2021 Liver disease    Joint replacement     cervical    Liver disease     Liver failure (HCC)     Pain     \"fibromyalgia\";lower back, right leg    Status post appendectomy         SURGICAL HISTORY  Past Surgical History:   Procedure Laterality Date    DC ERCP,DIAGNOSTIC N/A 01/14/2022    Procedure: ERCP, DIAGNOSTIC - WITH SIGMOID COLON BIOPSY AND STENT REMOVAL;  Surgeon: Cr Haro M.D.;  Location: SURGERY [facility] ER;  Service: Gastroenterology    THADDEUS BY LAPAROSCOPY N/A 10/28/2021    Procedure: CHOLECYSTECTOMY, LAPAROSCOPIC;  Surgeon: Tello Trammell M.D.;  Location: SURGERY [facility];  Service: General    DC ERCP,DIAGNOSTIC N/A 10/26/2021    Procedure: ERCP (ENDOSCOPIC RETROGRADE CHOLANGIOPANCREATOGRAPHY);  Surgeon: Cr Haro M.D.;  Location: SURGERY SAME DAY [facility];  Service: Gastroenterology    DC UPPER GI ENDOSCOPY,BIOPSY N/A 10/26/2021    Procedure: GASTROSCOPY, WITH BIOPSY;  Surgeon: Cr Haro M.D.;  Location: SURGERY SAME DAY [facility];  Service: Gastroenterology    DC UPPER GI ENDOSCOPY,DIAGNOSIS N/A 10/26/2021    Procedure: GASTROSCOPY;  Surgeon: Cr Haro M.D.;  Location: SURGERY SAME DAY [facility];  Service: Gastroenterology    CATH PLACEMENT  01/25/2020    Procedure: INSERTION, CATHETER PERM;  Surgeon: Rola Mendoza M.D.;  " Location: SURGERY [facility];  Service: General    IRRIGATION & DEBRIDEMENT NEURO  01/19/2020    Procedure: IRRIGATION AND DEBRIDEMENT, WOUND THORACIC AND LUMBAR;  Surgeon: Ryan Roman M.D.;  Location: SURGERY [facility];  Service: Neurosurgery    WA INS/RPLC SPI NPG/RCVR POCKET N/A 12/16/2019    Procedure: EXPLORATION AT THORACIC 8 - 9, REPLACEMENT OF  NEUROSTIMULATOR LEAD;  Surgeon: Ryan Roman M.D.;  Location: SURGERY [facility];  Service: Neurosurgery    SPINAL CORD STIMULATOR N/A 10/26/2018    Procedure: SPINAL CORD STIMULATOR;  Surgeon: Ryan Roman M.D.;  Location: SURGERY [facility];  Service: Neurosurgery    THORACIC LAMINECTOMY N/A 10/26/2018    Procedure: THORACIC LAMINECTOMY - FOR;  Surgeon: Ryan Roman M.D.;  Location: [facility];  Service: Neurosurgery    WA NJX AA&/STRD TFRML EPI LUMBAR/SACRAL 1 LEVEL Right 08/31/2016    Procedure: INJ-FORAMEN EPI LUM/SAC SNGL L4-5;  Surgeon: Sukhi Godfrey M.D.;  Location: SURGERY [facility];  Service: Pain Management    LUMBAR LAMINECTOMY DISKECTOMY Right 05/10/2016    Procedure: LUMBAR L4-5 HEMILAMINECTOMY DISKECTOMY ;  Surgeon: Arnold Keyes M.D.;  Location: [facility];  Service:     CERVICAL FUSION POSTERIOR  01/16/2009    Performed by TARA CONTRERAS at [facility]    HARDWARE REMOVAL NEURO  01/16/2009    Performed by TARA CONTRERAS at [facility]    NECK EXPLORATION  01/16/2009    Performed by TARA CONTRERAS at [facility]    SHOULDER ARTHROSCOPY W/ ROTATOR CUFF REPAIR  10/09/2008    Performed by SHERLY CASTANEDA at [facility]    SHOULDER DECOMPRESSION ARTHROSCOPIC  06/17/2008    Performed by SHERLY CASTANEDA at [facility]    CLAVICLE DISTAL EXCISION  06/17/2008    Performed by SHERLY CASTANEDA at [facility]    CERVICAL DISK AND FUSION ANTERIOR  03/12/2008    HYSTERECTOMY, VAGINAL  2006    APPENDECTOMY  2004     THYROID LOBECTOMY  1973    TONSILLECTOMY  1963    LUMPECTOMY  1976, 2005    Breast     LUMPECTOMY      OTHER ABDOMINAL SURGERY  2004    OTHER CARDIAC SURGERY  2020 stents inserted        SOCIAL HISTORY  Social History     Tobacco Use    Smoking status: Every Day     Current packs/day: 0.50     Average packs/day: 0.5 packs/day for 30.0 years (15.0 ttl pk-yrs)     Types: Cigarettes    Smokeless tobacco: Never   Vaping Use    Vaping Use: Never used   Substance and Sexual Activity    Alcohol use: Not Currently    Drug use: Not Currently     Types: Inhaled, Oral, Marijuana     Comment: Marijuana - Occasional; edibles    Sexual activity: Not Currently        FAMILY HISTORY  Family History   Problem Relation Age of Onset    Hypertension Mother     Cancer Father         CURRENT MEDICATIONS  Home Medications       Reviewed by Vivi Manjarrez R.N. (Registered Nurse) on 02/27/24 at 2124  Med List Status: Not Addressed     Medication Last Dose Status   amLODIPine (NORVASC) 5 MG Tab  Active   atorvastatin (LIPITOR) 40 MG Tab  Active   BD PEN NEEDLE SOFIA U/F  Active   Continuous Blood Gluc Sensor (FREESTYLE PARISA 3 SENSOR) Misc  Active   Cyanocobalamin (VITAMIN B-12 ER PO)  Active   famotidine (PEPCID) 20 MG Tab  Active   fenofibrate (TRICOR) 48 MG Tab  Active   furosemide (LASIX) 40 MG Tab  Active   Insulin Aspart, w/Niacinamide, (FIASP FLEXTOUCH) 100 UNIT/ML Solution Pen-injector  Active   Insulin Glargine, 1 Unit Dial, (TOUJEO SOLOSTAR) 300 UNIT/ML Solution Pen-injector  Active   liothyronine (CYTOMEL) 5 MCG Tab  Active   metoprolol SR (TOPROL XL) 100 MG TABLET SR 24 HR  Active   metoprolol SR (TOPROL XL) 50 MG TABLET SR 24 HR  Active   omeprazole (PRILOSEC) 40 MG delayed-release capsule  Active   ondansetron (ZOFRAN ODT) 8 MG TABLET DISPERSIBLE  Active   oxycodone (OXY-IR) 15 MG immediate release tablet  Active   potassium chloride (MICRO-K) 10 MEQ capsule  Active   riFAMPin (RIFADINE) 300 MG Cap  Active   SYNTHROID 125 MCG  "Tab  Active   traZODone (DESYREL) 100 MG Tab  Active   ursodiol (ACTIGALL) 300 MG Cap  Active   venlafaxine (EFFEXOR-XR) 150 MG extended-release capsule  Active   Vitamin D, Ergocalciferol, 50 MCG (2000 UT) Cap  Active                     ALLERGIES  Allergies   Allergen Reactions    Tape Unspecified and Rash     Blisters, paper tape is ok  Other reaction(s): Rash        PHYSICAL EXAM  VITAL SIGNS: BP (!) 168/80   Pulse 61   Temp 36.7 °C (98.1 °F) (Temporal)   Resp 20   Ht 1.676 m (5' 6\")   Wt 63.8 kg (140 lb 10.5 oz)   LMP 04/29/2005 (LMP Unknown)   SpO2 95%   BMI 22.70 kg/m²  @JOSE[[id number]::@     Pulse ox interpretation: 96% on room air I interpret this pulse ox as normal     Cardiac monitor interpretation: Sinus rhythm with heart rate in the 60s as interpreted by me.  The patient presented with syncope and cardiac monitor was ordered to monitor for dysrhythmia.    Constitutional: Well developed, well nourished, alert in no apparent distress, nontoxic appearance    HENT: No external signs of trauma, normocephalic  Eyes: PERRL, conjunctiva without erythema, no discharge, no icterus    Neck: Soft and supple, trachea midline, no stridor, no tenderness, no LAD, good ROM    Cardiovascular: Regular rate and rhythm, no murmurs/rubs/gallops, strong distal pulses and good perfusion    Thorax & Lungs: No respiratory distress, CTAB    Abdomen: Soft, nontender, nondistended, no guarding, no rebound, normal BS    Back: No CVAT     Extremities: No cyanosis, mild bilateral lower extremity nonpitting edema, no gross deformity, good ROM, intact distal pulses with brisk cap refill    Skin: Warm, dry, no pallor/cyanosis, no rash noted      Neuro: Alert and oriented to person, place, and time.  GCS 15.  CN II-XII grossly intact.  Normal speech.  Equal strength bilateral UE/LE.  Sensation intact to touch.  Psychiatric: Cooperative, flat affect and slightly anxious at times, no signs of hallucinations or delusions      DIAGNOSTIC " STUDIES / PROCEDURES    EKG  12 Lead EKG obtained at 2124 and interpreted by me:   Rate: 58   Rhythm: Sinus bradycardia  Ectopy: None  Intervals: Normal   Axis: Normal   QRS: Late precordial R wave transition  ST segments: Normal  T Waves: Normal    Clinical Impression: Sinus bradycardia without acute ischemic changes or other dysrhythmia  Compared to June 13, 2023 without significant change      LABS  All labs reviewed by me.     Results for orders placed or performed during the hospital encounter of 02/27/24   CBC WITH DIFFERENTIAL   Result Value Ref Range    WBC 7.7 4.8 - 10.8 K/uL    RBC 3.17 (L) 4.20 - 5.40 M/uL    Hemoglobin 9.1 (L) 12.0 - 16.0 g/dL    Hematocrit 28.8 (L) 37.0 - 47.0 %    MCV 90.9 81.4 - 97.8 fL    MCH 28.7 27.0 - 33.0 pg    MCHC 31.6 (L) 32.2 - 35.5 g/dL    RDW 57.1 (H) 35.9 - 50.0 fL    Platelet Count 308 164 - 446 K/uL    MPV 10.9 9.0 - 12.9 fL    Neutrophils-Polys 73.30 (H) 44.00 - 72.00 %    Lymphocytes 10.80 (L) 22.00 - 41.00 %    Monocytes 11.90 0.00 - 13.40 %    Eosinophils 1.80 0.00 - 6.90 %    Basophils 1.90 (H) 0.00 - 1.80 %    Immature Granulocytes 0.30 0.00 - 0.90 %    Nucleated RBC 0.00 0.00 - 0.20 /100 WBC    Neutrophils (Absolute) 5.65 1.82 - 7.42 K/uL    Lymphs (Absolute) 0.83 (L) 1.00 - 4.80 K/uL    Monos (Absolute) 0.92 (H) 0.00 - 0.85 K/uL    Eos (Absolute) 0.14 0.00 - 0.51 K/uL    Baso (Absolute) 0.15 (H) 0.00 - 0.12 K/uL    Immature Granulocytes (abs) 0.02 0.00 - 0.11 K/uL    NRBC (Absolute) 0.00 K/uL   COMP METABOLIC PANEL   Result Value Ref Range    Sodium 135 135 - 145 mmol/L    Potassium 4.4 3.6 - 5.5 mmol/L    Chloride 97 96 - 112 mmol/L    Co2 22 20 - 33 mmol/L    Anion Gap 16.0 7.0 - 16.0    Glucose 190 (H) 65 - 99 mg/dL    Bun 51 (H) 8 - 22 mg/dL    Creatinine 3.44 (H) 0.50 - 1.40 mg/dL    Calcium 8.5 8.5 - 10.5 mg/dL    Correct Calcium 9.3 8.5 - 10.5 mg/dL    AST(SGOT) 108 (H) 12 - 45 U/L    ALT(SGPT) 54 (H) 2 - 50 U/L    Alkaline Phosphatase 625 (H) 30 - 99 U/L     Total Bilirubin 0.8 0.1 - 1.5 mg/dL    Albumin 3.0 (L) 3.2 - 4.9 g/dL    Total Protein 8.6 (H) 6.0 - 8.2 g/dL    Globulin 5.6 (H) 1.9 - 3.5 g/dL    A-G Ratio 0.5 g/dL   LIPASE   Result Value Ref Range    Lipase 14 11 - 82 U/L   TROPONIN   Result Value Ref Range    Troponin T 113 (H) 6 - 19 ng/L   MAGNESIUM   Result Value Ref Range    Magnesium 2.6 (H) 1.5 - 2.5 mg/dL   PHOSPHORUS   Result Value Ref Range    Phosphorus 7.1 (H) 2.5 - 4.5 mg/dL   PROTHROMBIN TIME (INR)   Result Value Ref Range    PT 12.8 12.0 - 14.6 sec    INR 0.95 0.87 - 1.13   CoV-2, Flu A/B, And RSV by PCR ([place])    Specimen: Nasopharyngeal; Respirate   Result Value Ref Range    Influenza virus A RNA Negative Negative    Influenza virus B, PCR Negative Negative    RSV, PCR Negative Negative    SARS-CoV-2 by PCR NotDetected     SARS-CoV-2 Source NP Swab    TSH WITH REFLEX TO FT4   Result Value Ref Range    TSH 30.600 (H) 0.380 - 5.330 uIU/mL   ESTIMATED GFR   Result Value Ref Range    GFR (CKD-EPI) 14 (A) >60 mL/min/1.73 m 2   FREE THYROXINE   Result Value Ref Range    Free T-4 1.15 0.93 - 1.70 ng/dL   TROPONIN   Result Value Ref Range    Troponin T 110 (H) 6 - 19 ng/L   EKG   Result Value Ref Range    Report       [facility] Emergency Dept.    Test Date:  2024  Pt Name:    KALPANA BUTTS               Department: ER  MRN:        [id number]                      Room:  Gender:     Female                       Technician: [id number]  :        [id number]                   Requested By:ER TRIAGE PROTOCOL  Order #:    [id number]                    Reading MD:    Measurements  Intervals                                Axis  Rate:       58                           P:          49  PA:         154                          QRS:        20  QRSD:       100                          T:          45  QT:         477  QTc:        469    Interpretive Statements  Sinus bradycardia  Probable left atrial enlargement  Compared to ECG 2023  10:01:09  Sinus rhythm no longer present          RADIOLOGY  I have independently interpreted the diagnostic imaging associated with this visit and am waiting the final reading from the radiologist.   My preliminary interpretation is as follows: No acute findings on chest x-ray.    DX-CHEST-PORTABLE (1 VIEW)   Final Result         1. No acute cardiopulmonary abnormalities are identified.             COURSE & MEDICAL DECISION MAKING  Nursing notes, VS, PMSFHx reviewed in chart.     Review of past medical records shows the patient contacted nephrology today regarding her symptoms but did not receive a reply.  Patient was seen in the office by GI on February 21, 2024 for cholestatic liver disease, chronic kidney disease, nausea and vomiting.  CT abdomen/pelvis on February 23, 2024 had the following findings:    1.  Hepatosplenomegaly with hepatic steatosis.     2.  Previous cholecystectomy.     3.  Severe diffuse colonic constipation.     4.  Moderate dextroscoliosis of the lumbar spine with moderate to severe osteoarthritic changes.      Differential diagnoses considered include but are not limited to: Syncope, near syncope, hypoglycemia, vasovagal episode, dysrhythmia, dehydration, electrolyte abnormality, anemia      ED Observation Status? Yes; I am placing the patient in to an observation status due to a diagnostic uncertainty as well as therapeutic intensity. Patient placed in observation status at 11:18 PM, 2/27/2024.     Observation plan is as follows: We will obtain EKG, imaging and laboratory studies and monitor patient in the ED.    Upon Reevaluation, the patient's condition has: not improved; and will be escalated to hospitalization.    Patient discharged from ED Observation status at 0449 on February 28, 2024.       INITIAL ASSESSMENT AND PLAN  Care Narrative: This is a 68-year-old female patient with history of insulin-dependent diabetes, polyneuropathy, hypothyroidism, anemia, arthritis, hypertension,  high cholesterol, liver disease, renal disease, depression who presents to the ED complaining of syncope every time she stands up and also complaining of swelling to her face and legs.  Will obtain EKG, imaging and laboratory studies and closely monitor patient in the ED.      Discussion of management with other [facility] or appropriate source(s): Hospitalist    0449: D/W Dr. Valdovinos, hospitalist, who will admit patient      History and physical exam as above.  EKG shows sinus bradycardia without acute ischemic changes or significant change compared to prior.  Patient with chronic troponin elevation although her levels today are higher.  No increase on delta troponin.  I suspect her elevated troponin level is due to worsening chronic kidney disease rather than ACS.  Patient also reports increasing swelling of her legs and face.  Chest x-ray without acute findings to suggest acute pulmonary edema.  Patient also reports she has had cough.  No signs of focal pneumonia on chest x-ray and influenza/RSV/COVID testing returned negative.  Patient without leukocytosis and afebrile in the ED.  She has chronic anemia with levels today slightly lower than her most recent results.  Her renal function is also worse compared to recent results and transaminase levels have increased slightly as well.  I discussed the findings with patient.  She reports no improvement and is very unsteady when we try to ambulate the patient in the ED and reports that she is symptomatic with lightheadedness and dizziness.  She is uncomfortable with discharge home especially since she lives alone and would like to be admitted.  I discussed with hospitalist who graciously agreed to admit patient.      FINAL IMPRESSION  1. Syncope, unspecified syncope type Acute   2. Dizziness Acute   3. Edema, unspecified type Acute   4. Renal dysfunction Active          DISPOSITION  Patient will be admitted by hospitalist for further care      Electronically signed by: Armani MCCORMICK  BRET Snatillan, 2/27/2024 11:04 PM      Portions of this record were made with voice recognition software.  Despite my review, errors may remain.  Please interpret this chart in the appropriate context.

## 2024-02-28 NOTE — ASSESSMENT & PLAN NOTE
A1c 8 (2/29/2024).  Glucose around 300s.  Increase glargine to 30 units and reassess in 48 hours.  Tighten sliding scale.  Hypoglycemic protocol

## 2024-02-29 PROBLEM — J44.9 COPD (CHRONIC OBSTRUCTIVE PULMONARY DISEASE) (HCC): Status: ACTIVE | Noted: 2024-02-29

## 2024-02-29 PROBLEM — K75.9 HEPATITIS: Status: RESOLVED | Noted: 2021-10-21 | Resolved: 2024-02-29

## 2024-02-29 PROBLEM — Z72.0 TOBACCO ABUSE: Chronic | Status: RESOLVED | Noted: 2020-01-18 | Resolved: 2024-02-29

## 2024-02-29 PROBLEM — E87.20 METABOLIC ACIDOSIS: Status: ACTIVE | Noted: 2024-02-29

## 2024-02-29 LAB
ALBUMIN SERPL BCP-MCNC: 2.4 G/DL (ref 3.2–4.9)
ALBUMIN/GLOB SERPL: 0.5 G/DL
ALP SERPL-CCNC: 547 U/L (ref 30–99)
ALT SERPL-CCNC: 41 U/L (ref 2–50)
ANION GAP SERPL CALC-SCNC: 17 MMOL/L (ref 7–16)
ANION GAP SERPL CALC-SCNC: 18 MMOL/L (ref 7–16)
AST SERPL-CCNC: 83 U/L (ref 12–45)
B-OH-BUTYR SERPL-MCNC: 0.12 MMOL/L (ref 0.02–0.27)
BASOPHILS # BLD AUTO: 1.5 % (ref 0–1.8)
BASOPHILS # BLD: 0.11 K/UL (ref 0–0.12)
BILIRUB SERPL-MCNC: 0.5 MG/DL (ref 0.1–1.5)
BUN SERPL-MCNC: 56 MG/DL (ref 8–22)
BUN SERPL-MCNC: 56 MG/DL (ref 8–22)
CALCIUM ALBUM COR SERPL-MCNC: 9.3 MG/DL (ref 8.5–10.5)
CALCIUM SERPL-MCNC: 8 MG/DL (ref 8.5–10.5)
CALCIUM SERPL-MCNC: 8.5 MG/DL (ref 8.5–10.5)
CHLORIDE SERPL-SCNC: 98 MMOL/L (ref 96–112)
CHLORIDE SERPL-SCNC: 98 MMOL/L (ref 96–112)
CO2 SERPL-SCNC: 19 MMOL/L (ref 20–33)
CO2 SERPL-SCNC: 20 MMOL/L (ref 20–33)
CREAT SERPL-MCNC: 2.97 MG/DL (ref 0.5–1.4)
CREAT SERPL-MCNC: 3 MG/DL (ref 0.5–1.4)
EOSINOPHIL # BLD AUTO: 0.17 K/UL (ref 0–0.51)
EOSINOPHIL NFR BLD: 2.3 % (ref 0–6.9)
ERYTHROCYTE [DISTWIDTH] IN BLOOD BY AUTOMATED COUNT: 55.1 FL (ref 35.9–50)
EST. AVERAGE GLUCOSE BLD GHB EST-MCNC: 203 MG/DL
GFR SERPLBLD CREATININE-BSD FMLA CKD-EPI: 17 ML/MIN/1.73 M 2
GFR SERPLBLD CREATININE-BSD FMLA CKD-EPI: 17 ML/MIN/1.73 M 2
GLOBULIN SER CALC-MCNC: 5.3 G/DL (ref 1.9–3.5)
GLUCOSE BLD STRIP.AUTO-MCNC: 373 MG/DL (ref 65–99)
GLUCOSE SERPL-MCNC: 220 MG/DL (ref 65–99)
GLUCOSE SERPL-MCNC: 287 MG/DL (ref 65–99)
HBA1C MFR BLD: 8.7 % (ref 4–5.6)
HCT VFR BLD AUTO: 24.8 % (ref 37–47)
HGB BLD-MCNC: 8.1 G/DL (ref 12–16)
IMM GRANULOCYTES # BLD AUTO: 0.03 K/UL (ref 0–0.11)
IMM GRANULOCYTES NFR BLD AUTO: 0.4 % (ref 0–0.9)
LYMPHOCYTES # BLD AUTO: 1.06 K/UL (ref 1–4.8)
LYMPHOCYTES NFR BLD: 14.5 % (ref 22–41)
MAGNESIUM SERPL-MCNC: 2.4 MG/DL (ref 1.5–2.5)
MCH RBC QN AUTO: 29.1 PG (ref 27–33)
MCHC RBC AUTO-ENTMCNC: 32.7 G/DL (ref 32.2–35.5)
MCV RBC AUTO: 89.2 FL (ref 81.4–97.8)
MONOCYTES # BLD AUTO: 0.93 K/UL (ref 0–0.85)
MONOCYTES NFR BLD AUTO: 12.7 % (ref 0–13.4)
NEUTROPHILS # BLD AUTO: 5 K/UL (ref 1.82–7.42)
NEUTROPHILS NFR BLD: 68.6 % (ref 44–72)
NRBC # BLD AUTO: 0 K/UL
NRBC BLD-RTO: 0 /100 WBC (ref 0–0.2)
PHOSPHATE SERPL-MCNC: 5.9 MG/DL (ref 2.5–4.5)
PLATELET # BLD AUTO: 261 K/UL (ref 164–446)
PMV BLD AUTO: 11.1 FL (ref 9–12.9)
POTASSIUM SERPL-SCNC: 4 MMOL/L (ref 3.6–5.5)
POTASSIUM SERPL-SCNC: 4.5 MMOL/L (ref 3.6–5.5)
PROT SERPL-MCNC: 7.7 G/DL (ref 6–8.2)
RBC # BLD AUTO: 2.78 M/UL (ref 4.2–5.4)
SODIUM SERPL-SCNC: 135 MMOL/L (ref 135–145)
SODIUM SERPL-SCNC: 135 MMOL/L (ref 135–145)
T3FREE SERPL-MCNC: 1.11 PG/ML (ref 2–4.4)
WBC # BLD AUTO: 7.3 K/UL (ref 4.8–10.8)

## 2024-02-29 PROCEDURE — 80048 BASIC METABOLIC PNL TOTAL CA: CPT

## 2024-02-29 PROCEDURE — 700102 HCHG RX REV CODE 250 W/ 637 OVERRIDE(OP): Performed by: STUDENT IN AN ORGANIZED HEALTH CARE EDUCATION/TRAINING PROGRAM

## 2024-02-29 PROCEDURE — 83036 HEMOGLOBIN GLYCOSYLATED A1C: CPT

## 2024-02-29 PROCEDURE — A9270 NON-COVERED ITEM OR SERVICE: HCPCS | Performed by: STUDENT IN AN ORGANIZED HEALTH CARE EDUCATION/TRAINING PROGRAM

## 2024-02-29 PROCEDURE — 99232 SBSQ HOSP IP/OBS MODERATE 35: CPT | Mod: GC | Performed by: HOSPITALIST

## 2024-02-29 PROCEDURE — 700102 HCHG RX REV CODE 250 W/ 637 OVERRIDE(OP): Performed by: HOSPITALIST

## 2024-02-29 PROCEDURE — 80053 COMPREHEN METABOLIC PANEL: CPT

## 2024-02-29 PROCEDURE — 700105 HCHG RX REV CODE 258

## 2024-02-29 PROCEDURE — A9270 NON-COVERED ITEM OR SERVICE: HCPCS | Performed by: HOSPITALIST

## 2024-02-29 PROCEDURE — 700105 HCHG RX REV CODE 258: Performed by: HOSPITALIST

## 2024-02-29 PROCEDURE — 84100 ASSAY OF PHOSPHORUS: CPT

## 2024-02-29 PROCEDURE — 82010 KETONE BODYS QUAN: CPT

## 2024-02-29 PROCEDURE — A9270 NON-COVERED ITEM OR SERVICE: HCPCS

## 2024-02-29 PROCEDURE — 82962 GLUCOSE BLOOD TEST: CPT | Mod: 91

## 2024-02-29 PROCEDURE — 83735 ASSAY OF MAGNESIUM: CPT

## 2024-02-29 PROCEDURE — 85025 COMPLETE CBC W/AUTO DIFF WBC: CPT

## 2024-02-29 PROCEDURE — 84481 FREE ASSAY (FT-3): CPT

## 2024-02-29 PROCEDURE — 700102 HCHG RX REV CODE 250 W/ 637 OVERRIDE(OP)

## 2024-02-29 PROCEDURE — 700111 HCHG RX REV CODE 636 W/ 250 OVERRIDE (IP): Performed by: STUDENT IN AN ORGANIZED HEALTH CARE EDUCATION/TRAINING PROGRAM

## 2024-02-29 PROCEDURE — 770020 HCHG ROOM/CARE - TELE (206)

## 2024-02-29 RX ORDER — SODIUM CHLORIDE 9 MG/ML
INJECTION, SOLUTION INTRAVENOUS CONTINUOUS
Status: DISCONTINUED | OUTPATIENT
Start: 2024-02-29 | End: 2024-03-01

## 2024-02-29 RX ADMIN — HYDROMORPHONE HYDROCHLORIDE 0.5 MG: 1 INJECTION, SOLUTION INTRAMUSCULAR; INTRAVENOUS; SUBCUTANEOUS at 06:36

## 2024-02-29 RX ADMIN — INSULIN HUMAN 8 UNITS: 100 INJECTION, SOLUTION PARENTERAL at 19:37

## 2024-02-29 RX ADMIN — LEVOTHYROXINE SODIUM 250 MCG: 0.12 TABLET ORAL at 05:10

## 2024-02-29 RX ADMIN — ATORVASTATIN CALCIUM 40 MG: 40 TABLET, FILM COATED ORAL at 20:36

## 2024-02-29 RX ADMIN — HYDROMORPHONE HYDROCHLORIDE 0.5 MG: 1 INJECTION, SOLUTION INTRAMUSCULAR; INTRAVENOUS; SUBCUTANEOUS at 11:54

## 2024-02-29 RX ADMIN — TRAZODONE HYDROCHLORIDE 150 MG: 100 TABLET ORAL at 20:36

## 2024-02-29 RX ADMIN — SODIUM CHLORIDE: 9 INJECTION, SOLUTION INTRAVENOUS at 11:42

## 2024-02-29 RX ADMIN — URSODIOL 300 MG: 300 CAPSULE ORAL at 12:07

## 2024-02-29 RX ADMIN — URSODIOL 300 MG: 300 CAPSULE ORAL at 16:28

## 2024-02-29 RX ADMIN — VENLAFAXINE HYDROCHLORIDE 150 MG: 75 CAPSULE, EXTENDED RELEASE ORAL at 05:10

## 2024-02-29 RX ADMIN — OLODATEROL RESPIMAT INHALATION SPRAY 2 INHALATION: 2.5 SPRAY, METERED RESPIRATORY (INHALATION) at 19:38

## 2024-02-29 RX ADMIN — AMLODIPINE BESYLATE 5 MG: 5 TABLET ORAL at 16:28

## 2024-02-29 RX ADMIN — SODIUM CHLORIDE, POTASSIUM CHLORIDE, SODIUM LACTATE AND CALCIUM CHLORIDE 1000 ML: 600; 310; 30; 20 INJECTION, SOLUTION INTRAVENOUS at 08:32

## 2024-02-29 RX ADMIN — URSODIOL 300 MG: 300 CAPSULE ORAL at 05:10

## 2024-02-29 RX ADMIN — SODIUM CHLORIDE: 9 INJECTION, SOLUTION INTRAVENOUS at 22:44

## 2024-02-29 RX ADMIN — INSULIN HUMAN 3 UNITS: 100 INJECTION, SOLUTION PARENTERAL at 14:24

## 2024-02-29 RX ADMIN — INSULIN HUMAN 2 UNITS: 100 INJECTION, SOLUTION PARENTERAL at 10:04

## 2024-02-29 RX ADMIN — METOPROLOL SUCCINATE 100 MG: 100 TABLET, EXTENDED RELEASE ORAL at 05:10

## 2024-02-29 RX ADMIN — HYDROMORPHONE HYDROCHLORIDE 0.5 MG: 1 INJECTION, SOLUTION INTRAMUSCULAR; INTRAVENOUS; SUBCUTANEOUS at 19:53

## 2024-02-29 RX ADMIN — FAMOTIDINE 20 MG: 20 TABLET, FILM COATED ORAL at 05:11

## 2024-02-29 RX ADMIN — NICOTINE TRANSDERMAL SYSTEM 21 MG: 21 PATCH, EXTENDED RELEASE TRANSDERMAL at 05:10

## 2024-02-29 RX ADMIN — LIOTHYRONINE SODIUM 5 MCG: 5 TABLET ORAL at 05:10

## 2024-02-29 ASSESSMENT — COGNITIVE AND FUNCTIONAL STATUS - GENERAL
DAILY ACTIVITIY SCORE: 24
SUGGESTED CMS G CODE MODIFIER DAILY ACTIVITY: CH
MOVING TO AND FROM BED TO CHAIR: A LITTLE
STANDING UP FROM CHAIR USING ARMS: A LITTLE
MOBILITY SCORE: 20
WALKING IN HOSPITAL ROOM: A LITTLE
CLIMB 3 TO 5 STEPS WITH RAILING: A LITTLE
SUGGESTED CMS G CODE MODIFIER MOBILITY: CJ

## 2024-02-29 ASSESSMENT — PAIN DESCRIPTION - PAIN TYPE
TYPE: ACUTE PAIN

## 2024-02-29 ASSESSMENT — ENCOUNTER SYMPTOMS
NEUROLOGICAL NEGATIVE: 1
EYES NEGATIVE: 1
HEMOPTYSIS: 0
COUGH: 0
SPUTUM PRODUCTION: 0
RESPIRATORY NEGATIVE: 1
GASTROINTESTINAL NEGATIVE: 1
CONSTITUTIONAL NEGATIVE: 1
PSYCHIATRIC NEGATIVE: 1
MUSCULOSKELETAL NEGATIVE: 1
CARDIOVASCULAR NEGATIVE: 1

## 2024-02-29 ASSESSMENT — FIBROSIS 4 INDEX: FIB4 SCORE: 3.28

## 2024-02-29 NOTE — CARE PLAN
The patient is Stable - Low risk of patient condition declining or worsening    Shift Goals  Clinical Goals: monitor vitals, monitor resp status,  Patient Goals: sleep  Family Goals: elissa    Progress made toward(s) clinical / shift goals:    Problem: Knowledge Deficit - Standard  Goal: Patient and family/care givers will demonstrate understanding of plan of care, disease process/condition, diagnostic tests and medications  Outcome: Progressing  Note: POC discussed with pt, all questions answered at this time.       Problem: Fall Risk  Goal: Patient will remain free from falls  Outcome: Progressing  Note: All appropriate fall precautions in place, pt uses call light for mobility assistance. Pt remains free from falls this shift         Patient is not progressing towards the following goals:

## 2024-02-29 NOTE — DIETARY
"Nutrition services: Day 1 of admit.  Anu Manuel is a 68 y.o. female with admitting DX of Acute renal failure superimposed on stage 4 chronic kidney disease, unspecified acute renal failure type    Consult received for Malnutrition Screening Tool: 5 (report of 34 or more lbs in past 3 months and poor appetite)    RD able to visit pt at bedside. Pt states has had a great appetite prior to admission that started about 1 month prior. Mentions has had diabetes since her 30s and is very familiar what foods influence her glycemic control, states this does not necessarily dictate how she eats. Mentions not wanting a diabetes diet restriction for her meal trays. Denies need for nutrition education for diabetes nor the need to connect with a diabetes educator. States has noticed increase in weight due tot edema, mentions mostly in ankles, left arm/hand, and face. Denies additional concerns or questions at this time.     Assessment:  Height: 167.6 cm (5' 6\")  Weight: 73 kg (160 lb 15 oz)  Body mass index is 25.98 kg/m²., BMI classification: Overweight   Diet/Intake: Regular     Evaluation:   Pertinent medical hx and presentation includes: acute renal failure on stage 4 CKD, Type 1 diabetes mellitus on insulin therapy, hypothyroidism  Pt intake PTA good per pt report. Per ADLs, pt consuming 50-75% x 1 meal charted thus far.   Per chart review, weight hx with unknown measurement sources  Wt Readings from Last 4 Encounters:   02/29/24 73 kg (160 lb 15 oz)   02/14/24 64.4 kg (142 lb)   02/13/24 65.4 kg (144 lb 3.2 oz)   12/21/23 58.5 kg (129 lb)   Wt appears to have trended up consistent with edema per pt report  Per nutrition focused physical exam, mild nonpitting edema to bilateral ankles, mild edema per left arm. No subcutaneous fat loss nor muscle wasting.   Skin: no pressure injuries.   Pertinent labs: anion gap 18, glucose 287, BUN 56, creatinine 3.0, albumin 2.4, phosphorus 5.9, A1c 8.7%; POC glucose: 143- 373 " on 2/28/24. Pt may need to have diabetes diet restriction added if unable to achieve glycemic control.   Pertinent meds: norvasc, lipitor, pepcid, glargine, SSI, synhtroid, cytomel, ursodiol, lactated ringers  83mL/hr for dose of 1000mL  Last BM PTA  RD discussed given insulin-dependent diabetes, does require adequate insulin to cover carbohydrates in diet to ensure glycemic control remains within target range. Pt verbalizes understanding.     Malnutrition Risk: Does not meet criteria for malnutrition at this time.    Recommendations/Plan:  Patient may need diabetes diet restriction if unable to achieve glycemic control with insulin coverage.   Encourage intake of meals  Document intake of all meals and/or supplements as % taken in ADL's to provide interdisciplinary communication across all shifts.   Monitor weight.    RD to monitor weekly per department policy, please consult as indicated
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
## 2024-02-29 NOTE — PROGRESS NOTES
Progress note    HPI: Anu Manuel is a 68 y.o. overweight female admitted 2/27/2024 for generalized weakness with persistent syncopal episodes in the setting of acute on chronic kidney disease stage IV with recent increase of outpatient Lasix from 20 mg to 40 mg twice daily..  Other conditions include oxygen dependent chronic obstructive pulmonary disease on 2 L via nasal cannula, insulin-dependent type 1 diabetes mellitus, hypertension, hypothyroidism, coronary artery disease status post stent x 2, tobacco use disorder and autoimmune hepatitis.  Responded favorably to LR given in the ED.    INTERVAL HISTORY:   Overnight, significant improvement per patient's report.  Per her report, she uses 2 L of oxygen at home but currently on room air and satting between 88 and 92%.  High anion gap metabolic acidosis.  Not on current hemodialysis schedule.     Vitals:    02/29/24 1607 02/29/24 1631 02/29/24 1633 02/29/24 1634   BP: (!) 178/94 (!) 172/98 (!) 164/88 (!) 172/89   Pulse: 69 69 70 70   Resp: 18      Temp: 36.5 °C (97.7 °F)      TempSrc: Temporal      SpO2: 91%      Weight:       Height:         Body mass index is 25.98 kg/m².    Review of Systems   Constitutional: Negative.    HENT: Negative.     Eyes: Negative.    Respiratory: Negative.  Negative for cough, hemoptysis and sputum production.    Cardiovascular: Negative.    Gastrointestinal: Negative.    Genitourinary: Negative.    Musculoskeletal: Negative.    Skin: Negative.    Neurological: Negative.    Endo/Heme/Allergies: Negative.    Psychiatric/Behavioral: Negative.     All other systems reviewed and are negative.     Physical Exam  Vitals and nursing note reviewed.   Cardiovascular:      Rate and Rhythm: Normal rate and regular rhythm.      Pulses: Normal pulses.      Heart sounds: Normal heart sounds.   Pulmonary:      Effort: Pulmonary effort is normal. No respiratory distress.      Breath sounds: Normal breath sounds. No wheezing.    Abdominal:      General: Bowel sounds are normal. There is no distension.      Palpations: Abdomen is soft.   Musculoskeletal:         General: Normal range of motion.   Skin:     General: Skin is warm.      Capillary Refill: Capillary refill takes less than 2 seconds.   Neurological:      Mental Status: She is alert. Mental status is at baseline.   Psychiatric:         Mood and Affect: Mood normal.         Behavior: Behavior normal.         Thought Content: Thought content normal.         Judgment: Judgment normal.       LABS:   Recent Labs     24   WBC 7.7 7.3   RBC 3.17* 2.78*   HEMOGLOBIN 9.1* 8.1*   HEMATOCRIT 28.8* 24.8*   MCV 90.9 89.2   MCH 28.7 29.1   MCHC 31.6* 32.7   RDW 57.1* 55.1*   PLATELETCT 308 261   MPV 10.9 11.1      Recent Labs     24  1048   SODIUM 135 135 135   POTASSIUM 4.4 4.0 4.5   CHLORIDE 97 98 98   CO2 22 19* 20   GLUCOSE 190* 287* 220*   BUN 51* 56* 56*   CREATININE 3.44* 3.00* 2.97*   CALCIUM 8.5 8.0* 8.5      Recent Labs     24  1048   ALTSGPT 54* 41  --    ASTSGOT 108* 83*  --    ALKPHOSPHAT 625* 547*  --    TBILIRUBIN 0.8 0.5  --    LIPASE 14  --   --    GLUCOSE 190* 287* 220*      RADIOLOGY:   DX-CHEST-PORTABLE (1 VIEW)   Final Result         1. No acute cardiopulmonary abnormalities are identified.           MEDICAL DECISION MAKIN-year-old female admitted 2024 for persistent weakness in the setting of acute on chronic kidney disease stage IV with high anion gap metabolic acidosis.  Unclear if secondary to hypovolemia, will check beta hydroxybutyrate to rule out ketoacidosis.  Satting between 88-92% on room air.  Anticipating early discharge due to significant improvement.    Disposition: Home with self-care per   Follow up outpatient with primary care physicians and other specialties already following.     ASSESSMENT AND PLAN:   * Acute renal failure  superimposed on stage 4 chronic kidney disease, unspecified acute renal failure type (HCC)  Assessment & Plan  Most likely prerenal since it improved with volume.  Will get orthostatics and change LR for  cc/h.  Strict I's and O's.  Needed hemodialysis in admission couple of years ago but not scheduled.  Followed outpatient by Dr. Najjar.  Avoid IV contrast/nephrotoxins/NSAIDs  Dose adjust meds for decreased GFR    Chronic obstructive pulmonary disease  Assessment & Plan  Diagnosed a week prior to admission by palliative nurse and put on oxygen 2 L at home.  Not using any inhalers at home, no PFTs, no cough/sputum, no exacerbations.  May be GOLD B.  Will start empiric treatment with LABA/LAMA.  0.5 L oxygen via nasal cannula for comfort.  Outpatient PFTs and pulmonary consult.    High anion gap metabolic acidosis  Assessment & Plan  Negative lactate, will check for ketoacidosis.  Ordered but hydroxybutyrate acid.  BMP daily    Type 1 diabetes mellitus on insulin therapy (HCC)  Assessment & Plan  A1c 8 (2/29/2024).  Glucose around 300s.  Increase glargine to 30 units and reassess in 48 hours.  Tighten sliding scale.  Hypoglycemic protocol    Continue appropriate medications for other chronic conditions including hypertension, hypothyroidism, coronary artery disease status post stent x 2, tobacco use disorder and autoimmune hepatitis.     Armando R. Reyes Yparraguirre, M.D.  Internal Medicine Resident   [facility]      This note was generated using voice recognition software which has a small chance of producing errors of grammar and possibly content. I have made every reasonable attempt to find and correct any obvious errors but expect that some may not be found prior to finalization of this note.

## 2024-02-29 NOTE — PROGRESS NOTES
Admitted early this morning by my colleague for RHEA on CKD.  She has had edema, decreased UOP, presyncope.  Recently increased her Lasix from 20 mg BID to 40 mg BID.  Started on IVF this morning, will watch UOP, renal function.   Will need to consult nephrology if her renal function doesn't trend back to baseline.  Adjusted analgesia, added nicoderm/gum.  Full note to follow.    Andres Ho M.D.

## 2024-02-29 NOTE — PROGRESS NOTES
4 Eyes Skin Assessment Completed by gatito man RN and jayde jordan RN.    Head WDL  Ears WDL  Nose WDL  Mouth WDL  Neck discoloration and blanching  Breast/Chest Blanching and Discoloration  Shoulder Blades WDL  Spine scar. Scab to right side of back  (R) Arm/Elbow/Hand redness blanching discoloration  (L) Arm/Elbow/Hand Redness, Blanching, and Discoloration  Abdomen Bruising  Groin WDL  Scrotum/Coccyx/Buttocks WDL  (R) Leg Redness, Blanching, Scab, and Edema  (L) Leg Redness, Blanching, and Edema  (R) Heel/Foot/Toe WDL  (L) Heel/Foot/Toe WDL          Devices In Places Tele Box      Interventions In Place N/A    Possible Skin Injury No    Pictures Uploaded Into Epic Yes  Wound Consult Placed N/A  RN Wound Prevention Protocol Ordered No

## 2024-02-29 NOTE — DISCHARGE PLANNING
TCN following. HTH/SCP chart reviewed. No new TCN needs identified. Please see prior TCN note from 2/29 for most recent discharge planning considerations if indicated. 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 See prior TCN note as indicated above for details re: current dc planning considerations as needed.    Completed:  Choice obtained: HH if indicated; note pt also agreeable to  blanket SNF referral policy if indicated at time of dc  SCP with non [facility] PCP.

## 2024-03-01 ENCOUNTER — PHARMACY VISIT (OUTPATIENT)
Dept: PHARMACY | Facility: MEDICAL CENTER | Age: 69
End: 2024-03-01
Payer: COMMERCIAL

## 2024-03-01 VITALS
BODY MASS INDEX: 23.38 KG/M2 | OXYGEN SATURATION: 94 % | RESPIRATION RATE: 18 BRPM | DIASTOLIC BLOOD PRESSURE: 79 MMHG | TEMPERATURE: 97.7 F | HEIGHT: 66 IN | WEIGHT: 145.5 LBS | HEART RATE: 77 BPM | SYSTOLIC BLOOD PRESSURE: 165 MMHG

## 2024-03-01 LAB
ALBUMIN SERPL BCP-MCNC: 2.8 G/DL (ref 3.2–4.9)
ALBUMIN/GLOB SERPL: 0.5 G/DL
ALP SERPL-CCNC: 616 U/L (ref 30–99)
ALT SERPL-CCNC: 41 U/L (ref 2–50)
ANION GAP SERPL CALC-SCNC: 16 MMOL/L (ref 7–16)
AST SERPL-CCNC: 87 U/L (ref 12–45)
BASOPHILS # BLD AUTO: 1.3 % (ref 0–1.8)
BASOPHILS # BLD: 0.13 K/UL (ref 0–0.12)
BILIRUB SERPL-MCNC: 0.7 MG/DL (ref 0.1–1.5)
BUN SERPL-MCNC: 52 MG/DL (ref 8–22)
CALCIUM ALBUM COR SERPL-MCNC: 9.2 MG/DL (ref 8.5–10.5)
CALCIUM SERPL-MCNC: 8.2 MG/DL (ref 8.5–10.5)
CHLORIDE SERPL-SCNC: 100 MMOL/L (ref 96–112)
CO2 SERPL-SCNC: 18 MMOL/L (ref 20–33)
CREAT SERPL-MCNC: 2.58 MG/DL (ref 0.5–1.4)
EOSINOPHIL # BLD AUTO: 0.16 K/UL (ref 0–0.51)
EOSINOPHIL NFR BLD: 1.6 % (ref 0–6.9)
ERYTHROCYTE [DISTWIDTH] IN BLOOD BY AUTOMATED COUNT: 53 FL (ref 35.9–50)
GFR SERPLBLD CREATININE-BSD FMLA CKD-EPI: 20 ML/MIN/1.73 M 2
GLOBULIN SER CALC-MCNC: 5.3 G/DL (ref 1.9–3.5)
GLUCOSE BLD STRIP.AUTO-MCNC: 189 MG/DL (ref 65–99)
GLUCOSE BLD STRIP.AUTO-MCNC: 372 MG/DL (ref 65–99)
GLUCOSE SERPL-MCNC: 232 MG/DL (ref 65–99)
HCT VFR BLD AUTO: 28.8 % (ref 37–47)
HGB BLD-MCNC: 9.6 G/DL (ref 12–16)
IMM GRANULOCYTES # BLD AUTO: 0.03 K/UL (ref 0–0.11)
IMM GRANULOCYTES NFR BLD AUTO: 0.3 % (ref 0–0.9)
LYMPHOCYTES # BLD AUTO: 0.66 K/UL (ref 1–4.8)
LYMPHOCYTES NFR BLD: 6.5 % (ref 22–41)
MAGNESIUM SERPL-MCNC: 2.3 MG/DL (ref 1.5–2.5)
MCH RBC QN AUTO: 29 PG (ref 27–33)
MCHC RBC AUTO-ENTMCNC: 33.3 G/DL (ref 32.2–35.5)
MCV RBC AUTO: 87 FL (ref 81.4–97.8)
MONOCYTES # BLD AUTO: 1.15 K/UL (ref 0–0.85)
MONOCYTES NFR BLD AUTO: 11.3 % (ref 0–13.4)
NEUTROPHILS # BLD AUTO: 8.01 K/UL (ref 1.82–7.42)
NEUTROPHILS NFR BLD: 79 % (ref 44–72)
NRBC # BLD AUTO: 0 K/UL
NRBC BLD-RTO: 0 /100 WBC (ref 0–0.2)
PLATELET # BLD AUTO: 281 K/UL (ref 164–446)
PMV BLD AUTO: 10.2 FL (ref 9–12.9)
POTASSIUM SERPL-SCNC: 4 MMOL/L (ref 3.6–5.5)
PROT SERPL-MCNC: 8.1 G/DL (ref 6–8.2)
RBC # BLD AUTO: 3.31 M/UL (ref 4.2–5.4)
SODIUM SERPL-SCNC: 134 MMOL/L (ref 135–145)
WBC # BLD AUTO: 10.1 K/UL (ref 4.8–10.8)

## 2024-03-01 PROCEDURE — 99238 HOSP IP/OBS DSCHRG MGMT 30/<: CPT | Mod: GC | Performed by: HOSPITALIST

## 2024-03-01 PROCEDURE — 85025 COMPLETE CBC W/AUTO DIFF WBC: CPT

## 2024-03-01 PROCEDURE — A9270 NON-COVERED ITEM OR SERVICE: HCPCS | Performed by: HOSPITALIST

## 2024-03-01 PROCEDURE — 80053 COMPREHEN METABOLIC PANEL: CPT

## 2024-03-01 PROCEDURE — 83735 ASSAY OF MAGNESIUM: CPT

## 2024-03-01 PROCEDURE — 700105 HCHG RX REV CODE 258

## 2024-03-01 PROCEDURE — RXMED WILLOW AMBULATORY MEDICATION CHARGE

## 2024-03-01 PROCEDURE — 700102 HCHG RX REV CODE 250 W/ 637 OVERRIDE(OP): Performed by: STUDENT IN AN ORGANIZED HEALTH CARE EDUCATION/TRAINING PROGRAM

## 2024-03-01 PROCEDURE — A9270 NON-COVERED ITEM OR SERVICE: HCPCS | Performed by: STUDENT IN AN ORGANIZED HEALTH CARE EDUCATION/TRAINING PROGRAM

## 2024-03-01 PROCEDURE — 700102 HCHG RX REV CODE 250 W/ 637 OVERRIDE(OP): Performed by: HOSPITALIST

## 2024-03-01 PROCEDURE — 82962 GLUCOSE BLOOD TEST: CPT

## 2024-03-01 PROCEDURE — 700102 HCHG RX REV CODE 250 W/ 637 OVERRIDE(OP)

## 2024-03-01 RX ORDER — METOPROLOL SUCCINATE 50 MG/1
150 TABLET, EXTENDED RELEASE ORAL EVERY MORNING
Qty: 90 TABLET | Refills: 1 | Status: SHIPPED | OUTPATIENT
Start: 2024-03-02 | End: 2024-03-20

## 2024-03-01 RX ORDER — UMECLIDINIUM BROMIDE AND VILANTEROL TRIFENATATE 62.5; 25 UG/1; UG/1
1 POWDER RESPIRATORY (INHALATION) DAILY
Qty: 60 EACH | Refills: 0 | Status: SHIPPED | OUTPATIENT
Start: 2024-03-01

## 2024-03-01 RX ORDER — LIOTHYRONINE SODIUM 5 UG/1
10 TABLET ORAL DAILY
Status: DISCONTINUED | OUTPATIENT
Start: 2024-03-02 | End: 2024-03-01 | Stop reason: HOSPADM

## 2024-03-01 RX ORDER — INSULIN GLARGINE 100 [IU]/ML
35 INJECTION, SOLUTION SUBCUTANEOUS EVERY EVENING
Qty: 21 ML | Refills: 0 | Status: ACTIVE | OUTPATIENT
Start: 2024-03-01 | End: 2024-03-20

## 2024-03-01 RX ORDER — AMOXICILLIN 250 MG
2 CAPSULE ORAL EVERY EVENING
Status: DISCONTINUED | OUTPATIENT
Start: 2024-03-01 | End: 2024-03-01 | Stop reason: HOSPADM

## 2024-03-01 RX ORDER — NICOTINE 21 MG/24HR
1 PATCH, TRANSDERMAL 24 HOURS TRANSDERMAL EVERY 24 HOURS
Qty: 28 PATCH | Refills: 0 | Status: SHIPPED | OUTPATIENT
Start: 2024-03-01 | End: 2024-03-31

## 2024-03-01 RX ORDER — AMLODIPINE BESYLATE 5 MG/1
10 TABLET ORAL EVERY EVENING
Status: DISCONTINUED | OUTPATIENT
Start: 2024-03-01 | End: 2024-03-01 | Stop reason: HOSPADM

## 2024-03-01 RX ORDER — POLYETHYLENE GLYCOL 3350 17 G/17G
1 POWDER, FOR SOLUTION ORAL
Status: DISCONTINUED | OUTPATIENT
Start: 2024-03-01 | End: 2024-03-01 | Stop reason: HOSPADM

## 2024-03-01 RX ORDER — AMLODIPINE BESYLATE 10 MG/1
10 TABLET ORAL EVERY EVENING
Qty: 30 TABLET | Refills: 1 | Status: SHIPPED | OUTPATIENT
Start: 2024-03-01 | End: 2024-03-20

## 2024-03-01 RX ORDER — METOPROLOL SUCCINATE 25 MG/1
50 TABLET, EXTENDED RELEASE ORAL ONCE
Status: COMPLETED | OUTPATIENT
Start: 2024-03-01 | End: 2024-03-01

## 2024-03-01 RX ORDER — DEXTROSE MONOHYDRATE 25 G/50ML
25 INJECTION, SOLUTION INTRAVENOUS
Status: DISCONTINUED | OUTPATIENT
Start: 2024-03-01 | End: 2024-03-01 | Stop reason: HOSPADM

## 2024-03-01 RX ADMIN — FAMOTIDINE 20 MG: 20 TABLET, FILM COATED ORAL at 04:17

## 2024-03-01 RX ADMIN — VENLAFAXINE HYDROCHLORIDE 150 MG: 75 CAPSULE, EXTENDED RELEASE ORAL at 04:17

## 2024-03-01 RX ADMIN — SODIUM CHLORIDE: 9 INJECTION, SOLUTION INTRAVENOUS at 08:18

## 2024-03-01 RX ADMIN — LIOTHYRONINE SODIUM 5 MCG: 5 TABLET ORAL at 04:17

## 2024-03-01 RX ADMIN — URSODIOL 300 MG: 300 CAPSULE ORAL at 04:17

## 2024-03-01 RX ADMIN — NICOTINE TRANSDERMAL SYSTEM 21 MG: 21 PATCH, EXTENDED RELEASE TRANSDERMAL at 04:16

## 2024-03-01 RX ADMIN — LEVOTHYROXINE SODIUM 250 MCG: 0.12 TABLET ORAL at 04:17

## 2024-03-01 RX ADMIN — URSODIOL 300 MG: 300 CAPSULE ORAL at 12:05

## 2024-03-01 RX ADMIN — METOPROLOL SUCCINATE 100 MG: 100 TABLET, EXTENDED RELEASE ORAL at 04:17

## 2024-03-01 RX ADMIN — METOPROLOL SUCCINATE 50 MG: 25 TABLET, EXTENDED RELEASE ORAL at 12:05

## 2024-03-01 ASSESSMENT — FIBROSIS 4 INDEX: FIB4 SCORE: 3.28

## 2024-03-01 ASSESSMENT — PAIN DESCRIPTION - PAIN TYPE: TYPE: ACUTE PAIN

## 2024-03-01 NOTE — PROGRESS NOTES
D/c instructions given, educated on worsening s/s. Pt understands and questions answered. D/c home with brother

## 2024-03-01 NOTE — CARE PLAN
The patient is Stable - Low risk of patient condition declining or worsening    Shift Goals  Clinical Goals: Monitor labs/VS, IVF, glucose control  Patient Goals: To go home  Family Goals: NA    Progress made toward(s) clinical / shift goals:      Problem: Knowledge Deficit - Standard  Goal: Patient and family/care givers will demonstrate understanding of plan of care, disease process/condition, diagnostic tests and medications  Description: Target End Date:  1-3 days or as soon as patient condition allows    Document in Patient Education    1.  Patient and family/caregiver oriented to unit, equipment, visitation policy and means for communicating concern  2.  Complete/review Learning Assessment  3.  Assess knowledge level of disease process/condition, treatment plan, diagnostic tests and medications  4.  Explain disease process/condition, treatment plan, diagnostic tests and medications  Outcome: Progressing     Problem: Depression  Goal: Patient and family/caregiver will verbalize accurate information about at least two of the possible causes of depression, three-four of the signs and symptoms of depression  Description: Target End Date:  1 to 3 days    1.  Assess the patient's and family/caregiver's knowledge regarding depression and its causes.  2.  Explain to the patient and family/caregiver regarding the major symptoms of depression.  3.  Inform the patient and family/caregiver that depression can be treated through medications and psychotherapy.  4.  Allow the patient to express feelings and perceptions  5.  Express hope to the patient with realistic comments about the patient's strengths and resources.  5.  Give positive feedback after a tasks are achieved.  6.  Encourage identification of positive aspects of self.  7.  Educate the patient about crisis intervention services such as suicide hotlines and other resources.  Outcome: Progressing     Problem: Fall Risk  Goal: Patient will remain free from  falls  Description: Target End Date:  Prior to discharge or change in level of care    Document interventions on the [place] Fall Risk Assessment    1.  Assess for fall risk factors  2.  Implement fall precautions  Outcome: Progressing     Problem: Pain - Standard  Goal: Alleviation of pain or a reduction in pain to the patient’s comfort goal  Description: Target End Date:  Prior to discharge or change in level of care    Document on Vitals flowsheet    1.  Document pain using the appropriate pain scale per order or unit policy  2.  Educate and implement non-pharmacologic comfort measures (i.e. relaxation, distraction, massage, cold/heat therapy, etc.)  3.  Pain management medications as ordered  4.  Reassess pain after pain med administration per policy  5.  If opiods administered assess patient's response to pain medication is appropriate per POSS sedation scale  6.  Follow pain management plan developed in collaboration with patient and interdisciplinary team (including palliative care or pain specialists if applicable)  Outcome: Progressing     Problem: Psychosocial  Goal: Patient's level of anxiety will decrease  Description: Target End Date:  1-3 days or as soon as patient condition allows    1.  Collaborate with patient and family/caregiver to identify triggers and develop strategies to cope with anxiety  2.  Implement stimuli reduction, calming techniques  3.  Pharmacologic management per provider order  4.  Encourage patient/family/care giver participation  5.  Collaborate with interdisciplinary team including Psychologist or Behavioral Health Team as needed  Outcome: Progressing  Goal: Patient's ability to verbalize feelings about condition will improve  Description: Target End Date:  Prior to discharge or change in level of care    1.  Discuss coping with medical condition and its effects  2.  Encourage patient participation in care  3.  Encourage acknowledgement of body changes and accompanying  emotions  4.  Perform depression screening  Outcome: Progressing  Goal: Patient's ability to re-evaluate and adapt role responsibilities will improve  Description: Target End Date:  Prior to discharge or change in level of care    1.  Assess family support  2.  Encourage support system participation in care  3.  Encouraged verbalization of feelings regarding caregiver responsibilities  4.  Discuss changes in role and responsibilities caused by patient's condition  Outcome: Progressing  Goal: Patient and family will demonstrate ability to cope with life altering diagnosis and/or procedure  Description: Target End Date:  1-3 days or as soon as patient condition allows    1. Expect initial shock and disbelief following diagnosis of cancer and traumatizing procedures (disfiguring surgery, colostomy, amputation).  2.  Assess patient and family/caregiver for stage of grief currently being experienced. Explain process as appropriate.  3.  Provide open, nonjudgmental environment. Use therapeutic communication skills of Active-Listening, acknowledgment, and so on.  4.  Encourage verbalization of thoughts or concerns and accept expressions of sadness, anger, rejection. Acknowledge normality of these feelings  5.  Be aware of mood swings, hostility, and other acting-out behavior. Set limits on inappropriate behavior, redirect negative thinking  6.  Be aware of debilitating depression. Ask patient direct questions about state of mind.  7.  Visit frequently and provide physical contact as appropriate, or provide frequent phone support as appropriate for setting. Arrange for care provider and support person to stay with patient as needed  8.  Reinforce teaching regarding disease process and treatments and provide information as appropriate about dying. Be honest; do not give false hope while providing emotional support  9.  Review past life experiences, role changes, and coping skills. Talk about things that interest the  patient  Outcome: Progressing  Goal: Spiritual and cultural needs incorporated into hospitalization  Description: Target End Date:  End of day 1    1.  Encourage verbalization of feelings, concerns, expectations and needs  2.  Collaborate with Case Management/  3.  Collaborate with Pastoral Care to meet spiritual needs  Outcome: Progressing     Problem: Communication  Goal: The ability to communicate needs accurately and effectively will improve  Description: Target End Date:  End of day 1    1.  Assess ability to communicate and understand  2.  Provide augmentative or alternative methods of communication devices  3.  Use /language line as appropriate  4.  Collaborate with Speech Therapy as needed  Outcome: Progressing     Problem: Discharge Barriers/Planning  Goal: Patient's continuum of care needs are met  Description: Target End Date:  Prior to discharge or change in level of care    1.  Identify potential discharge barriers on admission and throughout hospitalization  2.  Collaborate with Case Management, , Clinical Educators, Navigators and others on the transitional care team to meet discharge needs  3.  Involve patient/family/caregivers in setting and prioritizing goals for hospitalization and discharge  4.  Ensure Flu vaccinations are addressed  5.  Inquire if patient is interested in the Meds to Bed program  6.  Ensure patient and family/caregiver are able to demonstrate use of equipment as prescribed  7.  Ensure patient and family/caregiver can verbalize understanding of patient education  8.  Explain discharge instructions and medication reconciliation to patient and family/caregiver  Outcome: Progressing     Problem: Hemodynamics  Goal: Patient's hemodynamics, fluid balance and neurologic status will be stable or improve  Description: Target End Date:  Prior to discharge or change in level of care    Document on Assessment and I/O flowsheet templates    1.   Monitor vital signs, pulse oximetry and cardiac monitor per provider order and/or policy  2.  Maintain blood pressure per provider order  3.  Hemodynamic monitoring per provider order  4.  Manage IV fluids and IV infusions  5.  Monitor intake and output  6.  Daily weights per unit policy or provider order  7.  Assess peripheral pulses and capillary refill  8.  Assess color and body temperature  9.  Position patient for maximum circulation/cardiac output  10. Monitor for signs/symptoms of excessive bleeding  11. Assess mental status, restlessness and changes in level of consciousness  12. Monitor temperature and report fever or hypothermia to provider immediately. Consideration of targeted temperature management.  Outcome: Progressing     Problem: Respiratory  Goal: Patient will achieve/maintain optimum respiratory ventilation and gas exchange  Description: Target End Date:  Prior to discharge or change in level of care    Document on Assessment flowsheet    1.  Assess and monitor rate, rhythm, depth and effort of respiration  2.  Breath sounds assessed qshift and/or as needed  3.  Assess O2 saturation, administer/titrate oxygen as ordered  4.  Position patient for maximum ventilatory efficiency  5.  Turn, cough, and deep breath with splinting to improve effectiveness  6.  Collaborate with RT to administer medication/treatments per order  7.  Encourage use of incentive spirometer and encourage patient to cough after use and utilize splinting techniques if applicable  8.  Airway suctioning  9.  Monitor sputum production for changes in color, consistency and frequency  10. Perform frequent oral hygiene  11. Alternate physical activity with rest periods  Outcome: Progressing     Problem: Chest Tube Management  Goal: Complications related to chest tube will be avoided or minimized  Description: Target End Date:  when tube discontinued    1.  Frequent respiratory assessments  2.  Encourage cough and deep breathing  exercises  3.  Encourage mobility and meals out of bed  4.  Monitor chest tube output (drainage amount/color)  5.  Assess chest tube connection sites to ensure tube is patent with no air leaks  6.  Ensure water-seal chamber is at correct level  7.  Ensure appropriate level of function (water-seal, -20 cm H20, etc...)  8.  Monitor for sign/symptoms of crepitus  Outcome: Progressing     Problem: Fluid Volume  Goal: Fluid volume balance will be maintained  Description: Target End Date:  Prior to discharge or change in level of care    Document on I/O flowsheet    1.  Monitor intake and output as ordered  2.  Promote oral intake as appropriate  3.  Report inadequate intake or output to physician  4.  Administer IV therapy as ordered  5.  Weights per provider order  6.  Assess for signs and symptoms of bleeding  7.  Monitor for signs of fluid overload (respiratory changes, edema, weight gain, increased abdominal girth)  8.  Monitor of signs for inadequate fluid volume (poor skin turgor, dry mucous membranes)  9.  Instruct patient on adherence to fluid restrictions  Outcome: Progressing     Problem: Mechanical Ventilation  Goal: Safe management of artificial airway and ventilation  Description: Target End Date:  when vent discontinued    Document on VAP flowsheet and Airway LDA    1.  Daily awakening trials per provider order/policy  2.  Suctioning and care of ET/Trach tube (document on LDA)  3.  Collaborate with RT to administer medications/treatments  4.  Ambu bag at bedside and available for transport  5.  Trach patient - replacement trach at bedside  6.  Provide communication tools if applicable  Outcome: Progressing  Goal: Successful weaning off mechanical ventilator, spontaneously maintains adequate gas exchange  Description: Target End Date:  when vent discontinued    1.  Follow universal weaning protocol for patients on mechanical ventilation per order  2.  Review contraindication list.  Obtain provider order to  wean in presence of contraindication.  3.  Obtain Bimal Sedation-Agitation Score  4.  Bimal Score 1-2:  sedation vacation  5.  Bimal Score 3-4:  Collaborate with provider and/or RT to determine readiness for trial  6.  Begin 2 hour trial of weaning following protocol  7.  Evaluate for fatigue parameters per protocol  8.  Fatigue parameters triggered:  Stop wean and return to previous ASV% setting or increase % minute volume to offset work or breathing  Outcome: Progressing  Goal: Patient will be able to express needs and understand communication  Description: Target End Date:  when vent discontinued    1.  Assess ability to communicate and understand  2.  Provide communication tools  3.  Collaborate with Speech Therapy for [facility]  Outcome: Progressing     Problem: Dysphagia  Goal: Dysphagia will improve  Description: Target End Date:  Prior to discharge or change in level of care    1.  Assess and monitor ability to swallow  2.  Collaborate with Speech Therapy to determine appropriate adaptation for safe administration of medications and oral nutrition  3.  Elevate head of bed to 90 degrees during feedings and for 30 minutes after each feeding  4.  Encourage proper swallowing techniques  5.  Screening on admission or as soon as possible  Outcome: Progressing     Problem: Risk for Aspiration  Goal: Patient's risk for aspiration will be absent or decrease  Description: Target End Date:  Prior to discharge or change in level of care    1.   Complete dysphagia screening on admission  2.   NPO until dysphagia screening complete or medically cleared  3.   Collaborate with Speech Therapy, Clinical Dietitian and interdisciplinary team  4.   Implement aspiration precautions  5.   Assist patient up to chair for meals  6.   Elevate head of bed 90 degrees if patient is unable to get out of bed  7.   Encourage small bites  8.   Ensure foods/liquids are of appropriate consistency  9.   Assess for any signs/symptoms of  aspiration  10. Assess breath sounds and vital signs after oral intake  Outcome: Progressing     Problem: Nutrition  Goal: Patient's nutritional and fluid intake will be adequate or improve  Description: Target End Date:  Prior to discharge or change in level of care    Document on I/O flowsheet    1.  Monitor nutritional intake  2.  Monitor weight per provider order  3.  Assess patient's ability to take oral nutrition  4.  Collaborate with Speech Therapy, Dietitian and interdisciplinary team for appropriate feeding and fluid intake  5.  Assist with feeding  Outcome: Progressing  Goal: Enteral nutrition will be maintained or improve  Description: Target End Date:  Prior to discharge or change in level of care    1. Enteral access to be obtained or modified  2. Advance to goal rate per protocol  3. Collaborate with Clinical Dietitian for signs/symptoms of intolerance  4. Weights per provider order  5. Consider Speech Therapy consult for swallowing difficulties  Outcome: Progressing  Goal: Enteral nutrition will be maintained or improve  Description: Target End Date:  Prior to discharge or change in level of care    1.  Fingerstick glucose every 8 hours  2.  Notify provider for fever or elevated glucose  3.  TPN tubing and port changes every 24 hours.  Turn TPN through dedicated line. (Document on LDA)  4.  TPN dressing changes 24 hours after insertion and then every Saturday  (Document on LDA)  5.  Daily weights  6.  Monitor TPN lab results  7.  Collaborate with Clinical Dietician  8.  Collaborate with Pharmacist  Outcome: Progressing     Problem: Urinary Elimination  Goal: Establish and maintain regular urinary output  Description: Target End Date:  Prior to discharge or change in level of care    Document on I/O and Assessment flowsheets    1.  Evaluate need to continue indwelling catheter every shift  2.  Assess signs and symptoms of urinary retention  3.  Assess post-void residual volumes  4.  Implement bladder  training program  5.  Encourage scheduled voidings  6.  Assist patient to sit on bedside commode or toilet for voiding  7.  Educate patient and family/caregiver on use and purpose of urine collection devices (document in Patient Education)  Outcome: Progressing     Problem: Bowel Elimination  Goal: Establish and maintain regular bowel function  Description: Target End Date:  Prior to discharge or change in level of care    1.   Note date of last BM  2.   Educate about diet, fluid intake, medication and activity to promote bowel function  3.   Educate signs and symptoms of constipation and interventions to implement  4.   Pharmacologic bowel management per provider order  5.   Regular toileting schedule  6.   Upright position for toileting  7.   High fiber diet  8.   Encourage hydration  9.   Collaborate with Clinical Dietician  10. Care and maintenance of ostomy if applicable  Outcome: Progressing     Problem: Gastrointestinal Irritability  Goal: Nausea and vomiting will be absent or improve  Description: Target End Date:  Prior to discharge or change in level of care    Document on I/O and Assessment flowsheets    1.  Assess for history, duration, frequency, severity, and potential precipitating factors  2.  Administer antiemetics as ordered  3.  Emesis basin within easy reach of the patient, but out of sight if psychogenic component  4.  Eliminate strong odors from surroundings  5.  Introduce cold water, ice chips, jazmin products, and room temperature broth or bouillon if tolerated and appropriate to the patient's diet  6.  Encourage small amounts of bland, simple foods like broth, rice, bananas, Jell-O, crackers or toast if tolerated and appropriate to patient's diet  7.  Position the patient upright while eating and for 1 to 2 hours post-meal  8.  Encourage nonpharmacological nausea control techniques such as relaxation, guided imagery, music therapy, distraction, or deep breathing exercises  Outcome:  Progressing  Goal: Diarrhea will be absent or improved  Description: Target End Date:  Prior to discharge or change in level of care    1.  Assess for abdominal discomfort, pain, cramping, frequency, urgency, loose or liquid stools, and hyperactive bowel sensations.  2.  Evaluate pattern of defecation  3.  Administer antidiarrheal agents per order  4. Culture stool, per order  5.  Inquire about food intolerances, medications, change in eating pattern and current stressors  6.  Assess for fecal impaction  7.  Assess hydration status  8.  Assess condition of perianal skin  9.  Loss of bowel elimination control can lead of feelings of decreased self esteem.  Examine the emotional impact of illness, hospitalization and/or accidents.  Outcome: Progressing     Problem: Rectal Tube  Goal: Fecal output will be contained and skin will remain free from irritation  Description: Target End Date:  3 to 5 days    1.  Check tubing for leakage throughout shift, troubleshoot PRN per rectal tube order  2.  Flush blue irrigation tube once per shift and PRN with 60 mL of warm tap water  3.  Without removing tube from rectum, remove water from balloon and ensure less then or equal 45mL  4.  Ensure balloon is seeded to rectum by pulling slightly down on tubing  5.  Cleanse skin and apply barrier paste around tubing  Outcome: Progressing     Problem: Mobility  Goal: Patient's capacity to carry out activities will improve  Description: Target End Date:  Prior to discharge or change in level of care    1.  Assess for barriers to mobility/activity  2.  Implement activity per interdisciplinary team recommendations  3.  Target activity level identified and patient/family/caregiver aware of goal  4.  Provide assistive devices  5.  Instruct patient/caregiver on proper use of assistive/adaptive devices  6.  Schedule activities and rest periods to decrease effects of fatigue  7.  Encourage mobilization to extent of ability  8.  Maintain proper  body alignment  9.  Provide adequate pain management to allow progressive mobilization  10. Implement pace maker precautions as needed  Outcome: Progressing     Problem: Self Care  Goal: Patient will have the ability to perform ADLs independently or with assistance (bathe, groom, dress, toilet and feed)  Description: Target End Date:  Prior to discharge or change in level of care    Document on ADL flowsheet    1.  Assess the capability and level of deficiency to perform ADLs  2.  Encourage family/care giver involvement  3.  Provide assistive devices  4.  Consider PT/OT evaluations  5.  Maintain support, give positive feedback, encourage self-care allowing extra time and verbal cuing as needed  6.  Avoid doing something for patients they can do themselves, but provide assistance as needed  7.  Assist in anticipating/planning individual needs  8.  Collaborate with Case Management and  to meet discharge needs  Outcome: Progressing     Problem: Infection - Standard  Goal: Patient will remain free from infection  Description: Target End Date:  Prior to discharge or change in level of care    1.  Utilize Standard Precautions at all times to reduce the risk of transmission of microorganisms from both recognized and  unrecognized sources of infection  2.  Infection prevention handouts provided (general/device/diagnosis specific) and documented in Patient Education  3.  Educate patient and family/caregiver on isolation precautions if applicable  Outcome: Progressing     Problem: Wound/ / Incision Healing  Goal: Patient's wound/surgical incision will decrease in size and heals properly  Description: Target End Date:  Prior to discharge or change in level of care    Document on LDA    1.  Assess and document surgical incision/wound  2.  Provide incision/wound care per policy and/or provider orders  3.  Manage surgical drains per policy if applicable  4.  Encourage adequate nutrition to promote wound healing  5.   Collaborate with Clinical Dietician  Outcome: Progressing       Patient is not progressing towards the following goals: NA

## 2024-03-01 NOTE — PROGRESS NOTES
Pt medically cleared for discharge to home. PIV and tele monitor removed. Pt dressed and all personal possessions accounted for. Orders placed for discharge lounge.

## 2024-03-01 NOTE — DISCHARGE PLANNING
TCN following. HTH/SCP chart reviewed. As prior, no new TCN needs identified. Please see prior TCN note from 2/29 for most recent discharge planning considerations if indicated. 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 See prior TCN note as indicated above for details re: current dc planning considerations as needed.     Completed:  Choice obtained: HH if indicated; note pt also agreeable to  blanket SNF referral policy if indicated at time of dc  SCP with non [facility] PCP.

## 2024-03-01 NOTE — CARE PLAN
The patient is Stable - Low risk of patient condition declining or worsening    Shift Goals  Clinical Goals: monitor vitals, monitor resp status,  Patient Goals: sleep  Family Goals: elissa    Progress made toward(s) clinical / shift goals:    Problem: Pain - Standard  Goal: Alleviation of pain or a reduction in pain to the patient’s comfort goal  Outcome: Progressing  Note: Assessing patient's pain. Educated on way to manage pain with medication and other therapies.      Problem: Hemodynamics  Goal: Patient's hemodynamics, fluid balance and neurologic status will be stable or improve  Outcome: Progressing  Note: Measuring strict I/O throughout the shift.

## 2024-03-01 NOTE — CARE PLAN
The patient is Stable - Low risk of patient condition declining or worsening    Shift Goals  Clinical Goals: hemodynamic stability, IV fluids  Patient Goals: rest,go home  Family Goals: RILEY    Progress made toward(s) clinical / shift goals:        Problem: Knowledge Deficit - Standard  Goal: Patient and family/care givers will demonstrate understanding of plan of care, disease process/condition, diagnostic tests and medications  Outcome: Progressing     Problem: Pain - Standard  Goal: Alleviation of pain or a reduction in pain to the patient’s comfort goal  Outcome: Progressing     Problem: Communication  Goal: The ability to communicate needs accurately and effectively will improve  Outcome: Progressing       Patient is not progressing towards the following goals:

## 2024-03-01 NOTE — DOCUMENTATION QUERY
Count includes the [facility]                                                                       Query Response Note      PATIENT:               KALPANA BUTTS  ACCT #:                  [id number]  MRN:                     [id number]  :                      1955  ADMIT DATE:       2024 10:45 PM  DISCH DATE:          RESPONDING  PROVIDER #:        [id number]           QUERY TEXT:    The patient has elevated cardiac troponin levels.       Please clarify the cause.    The patient's clinical indicators include:  Clinical Findings:    Troponin:  -  113  -  110    - ED note: chronic troponin elevation; levels today are higher.  Suspect her elevated troponin level is due to worsening chronic kidney disease rather than ACS  - EKG:  sinus bradycardia without acute ischemic changes    -No c/o chest pain, palpitations, orthopnea       Risk Factors: RHEA on CKD 4, DM, CAD    Treatments: EKG, monitor labs,  IV fluids      Contact me with any questions.    Thank you for your time and attention,  Aline Persaud RN, CDI  [email or web address]  Connect via email, Voalte or messenger.  Options provided:   -- Non ischemic myocardial injury due to/associated with acute on chronic kidney disease   -- Elevated cardiac troponin levels only (without corresponding diagnosis)   -- Insignificant finding/no effect on patient stay and treatment   -- Other explanation, (please specify other explanation)   -- Clinically unable to determine      Query created by: Aline Persaud on 2024 12:39 PM    RESPONSE TEXT:    Non ischemic myocardial injury due to/associated with acute on chronic kidney disease          Electronically signed by:  COLUMBA JAIMES MD 3/1/2024 6:41 AM

## 2024-03-01 NOTE — DISCHARGE INSTRUCTIONS
Chronic Kidney Disease, Adult  Chronic kidney disease is when lasting damage happens to the kidneys slowly over a long time. The kidneys help to:  Make pee (urine).  Make hormones.  Keep the right amount of fluids and chemicals in the body.  Most often, this disease does not go away. You must take steps to help keep the kidney damage from getting worse. If steps are not taken, the kidneys might stop working forever.  What are the causes?  Diabetes.  High blood pressure.  Diseases that affect the heart and blood vessels.  Other kidney diseases.  Diseases of the body's disease-fighting system.  A problem with the flow of pee.  Infections of the organs that make pee, store it, and take it out of the body.  Swelling or irritation of your blood vessels.  What increases the risk?  Getting older.  Having someone in your family who has kidney disease or kidney failure.  Having a disease caused by genes.  Taking medicines often that harm the kidneys.  Being near or having contact with harmful substances.  Being very overweight.  Using tobacco now or in the past.  What are the signs or symptoms?  Feeling very tired.  Having a swollen face, legs, ankles, or feet.  Feeling like you may vomit or vomiting.  Not feeling hungry.  Being confused or not able to focus.  Twitches and cramps in the leg muscles or other muscles.  Dry, itchy skin.  A taste of metal in your mouth.  Making less pee, or making more pee.  Shortness of breath.  Trouble sleeping.  You may also become anemic or get weak bones. Anemic means there is not enough red blood cells or hemoglobin in your blood.  You may get symptoms slowly. You may not notice them until the kidney damage gets very bad.  How is this treated?  Often, there is no cure for this disease. Treatment can help with symptoms and help keep the disease from getting worse. You may need to:  Avoid alcohol.  Avoid foods that are high in salt, potassium, phosphorous, and protein.  Take medicines for  symptoms and to help control other conditions.  Have dialysis. This treatment gets harmful waste out of your body.  Treat other problems that cause your kidney disease or make it worse.  Follow these instructions at home:  Medicines  Take over-the-counter and prescription medicines only as told by your doctor.  Do not take any new medicines, vitamins, or supplements unless your doctor says it is okay.  Lifestyle    Do not smoke or use any products that contain nicotine or tobacco. If you need help quitting, ask your doctor.  If you drink alcohol:  Limit how much you use to:  0-1 drink a day for women who are not pregnant.  0-2 drinks a day for men.  Know how much alcohol is in your drink. In the [place] one drink equals one 12 oz bottle of beer (355 mL), one 5 oz glass of wine (148 mL), or one 1½ oz glass of hard liquor (44 mL).  Stay at a healthy weight. If you need help losing weight, ask your doctor.  General instructions    Follow instructions from your doctor about what you cannot eat or drink.  Track your blood pressure at home. Tell your doctor about any changes.  If you have diabetes, track your blood sugar.  Exercise at least 30 minutes a day, 5 days a week.  Keep your shots (vaccinations) up to date.  Keep all follow-up visits.  Where to find more information  American Association of Kidney Patients: www.aakp.org  National Kidney Foundation: www.kidney.org  American Kidney Fund: www.akfinc.org  Life Options: www.lifeoptions.org  Kidney School: www.kidneyschool.org  Contact a doctor if:  Your symptoms get worse.  You get new symptoms.  Get help right away if:  You get symptoms of end-stage kidney disease. These include:  Headaches.  Losing feeling in your hands or feet.  Easy bruising.  Having hiccups often.  Chest pain.  Shortness of breath.  Lack of menstrual periods, in women.  You have a fever.  You make less pee than normal.  You have pain or you bleed when you pee or poop.  These symptoms may be an  emergency. Get help right away. Call your local emergency services (911 in the [place]  Do not wait to see if the symptoms will go away.  Do not drive yourself to the hospital.  Summary  Chronic kidney disease is when lasting damage happens to the kidneys slowly over a long time.  Causes of this disease include diabetes and high blood pressure.  Often, there is no cure for this disease. Treatment can help symptoms and help keep the disease from getting worse.  Treatment may involve lifestyle changes, medicines, and dialysis.  This information is not intended to replace advice given to you by your health care provider. Make sure you discuss any questions you have with your health care provider.  Document Revised: 03/24/2021 Document Reviewed: 03/24/2021  Elsevier Patient Education © 2023 Elsevier Inc.

## 2024-03-01 NOTE — DISCHARGE SUMMARY
"[facility] Discharge Summary    Attending: JESSICA Park M.d.  Senior Resident: Mei Goins DO  Intern: Herb Mg MD  Contact Number: [phone]    CHIEF COMPLAINT ON ADMISSION  Chief Complaint   Patient presents with    Dizziness     Pt reports \"every time I stand up I pass out\" today. Pt has hx of renal and liver failure.       Reason for Admission  Presyncope    Admission Date  2/27/2024    CODE STATUS  DNAR/DNI    HPI & HOSPITAL COURSE    Ms. Manuel is a 68-year-old female with a past medical history of autoimmune hepatitis, type 1 diabetes, CAD with 2 previous MINDY, hypothyroidism, CKD stage IV, hypertension who initially presented on 2/28 with worsening generalized weakness and presyncope.    Of note, the patient was recently seen by nephrology who increased her Lasix from 20 mg twice daily to 40 mg twice daily.  She notes that she was complaining of significantly decreased urine output as well as worsening swelling and edema in her lower extremities.    While in the ED, the patient presented with a creatinine of 3.44, of note her previous creatinine in 2023 ranged from 1.33 up to about 1.8.  Patient's BUN was also significant elevated during this hospitalization which was consistent with prerenal azotemia and she was treated with fluid resuscitation.    The patient's creatinine continue to improve with fluid resuscitation, hospitalization was complicated by significantly elevated blood pressures and her home blood pressure medications were increased to 10 mg of amlodipine daily.  Upon discharge, she can be restarted on her diuretics however at her previous dose of 20 mg twice daily as opposed to 40 mg twice daily.  Her baseline insulin was also increased due to elevated blood sugars.  Smoking cessation was also discussed with the patient, she states that she is willing to try to give up cigarettes as long as we can give her some nicotine patches which will be given at " discharge.    Therefore, she is discharged in good and stable condition to home with close outpatient follow-up.    The patient met 2-midnight criteria for an inpatient stay at the time of discharge.    Discharge Date  3/1/2024    Physical Exam on Day of Discharge  Physical Exam  Vitals and nursing note reviewed.   Cardiovascular:      Rate and Rhythm: Normal rate and regular rhythm.      Pulses: Normal pulses.      Heart sounds: Normal heart sounds.   Pulmonary:      Effort: Pulmonary effort is normal. No respiratory distress.      Breath sounds: Normal breath sounds. No wheezing.   Abdominal:      General: Bowel sounds are normal. There is no distension.      Palpations: Abdomen is soft.   Musculoskeletal:         General: Normal range of motion.   Skin:     General: Skin is warm.      Capillary Refill: Capillary refill takes less than 2 seconds.   Neurological:      Mental Status: She is alert. Mental status is at baseline.   Psychiatric:         Mood and Affect: Mood normal.         Behavior: Behavior normal.         Thought Content: Thought content normal.         Judgment: Judgment normal.     FOLLOW UP ITEMS POST DISCHARGE  - Patient's home antihypertensive medications were modified with an increase in her amlodipine and metoprolol.  Primary care to follow-up and monitor for changes to blood pressure.  - Diuretics were changed back to her previous home dose prior to her symptoms and presentation.  - Patient to follow-up with primary care within 1 to 4 weeks to discuss her recent hospitalization and to evaluate monitor for chronic medical conditions.  - Patient to follow-up with nephrology for ongoing management, stated that she can follow-up within 2 to 4 weeks.    DISCHARGE DIAGNOSES  Principal Problem:    Acute renal failure superimposed on stage 4 chronic kidney disease, unspecified acute renal failure type (HCC) (POA: Yes)  Active Problems:    Type 1 diabetes mellitus on insulin therapy (HCC) (POA: Yes)       Overview: ICD-10 transition    High anion gap metabolic acidosis (POA: Unknown)    Chronic obstructive pulmonary disease (POA: Unknown)  Resolved Problems:    Hypothyroidism, postsurgical (POA: Yes)    Tobacco abuse (Chronic) (POA: Yes)    Hepatitis (POA: Yes)      FOLLOW UP  Future Appointments   Date Time Provider Department Center   3/20/2024  1:00 PM Fadi Najjar, M.D. [facility]   4/11/2024  3:40 PM DIPESH Cm     No follow-up provider specified.    MEDICATIONS ON DISCHARGE     Medication List        START taking these medications        Instructions   Lantus SoloStar 100 UNIT/ML Sopn injection  Generic drug: insulin glargine  Replaces: Toujeo SoloStar 300 UNIT/ML Sopn   Inject 35 Units under the skin every evening for 60 days.  Dose: 35 Units     nicotine 21 MG/24HR Pt24  Commonly known as: Nicoderm   Place 1 Patch on the skin every 24 hours for 30 days.  Dose: 1 Patch     olodaterol 2.5 MCG/ACT Aers  Commonly known as: Striverdi Respimat   Inhale 2 Inhalations every day at 6 PM for 30 days.  Dose: 2 Inhalation            CHANGE how you take these medications        Instructions   amLODIPine 10 MG Tabs  What changed:   medication strength  how much to take  when to take this  Commonly known as: Norvasc   Take 1 Tablet by mouth every evening for 60 days.  Dose: 10 mg     famotidine 20 MG Tabs  What changed: when to take this  Commonly known as: Pepcid   TAKE 1 TABLET BY MOUTH TWICE A DAY     metoprolol SR 50 MG Tb24  Start taking on: March 2, 2024  What changed:   how much to take  additional instructions  Another medication with the same name was removed. Continue taking this medication, and follow the directions you see here.  Commonly known as: Toprol XL   Take 3 Tablets by mouth every morning for 60 days.  Dose: 150 mg            CONTINUE taking these medications        Instructions   atorvastatin 40 MG Tabs  Commonly known as: Lipitor   Take 1 Tablet by mouth every  evening.  Dose: 40 mg     BD Pen Needle Ann Marie U/F  Generic drug: Insulin Pen Needle 32 G x 4 mm   For use with 2-3 daily insulin     fenofibrate 48 MG Tabs  Commonly known as: Tricor   Take 48 mg by mouth every morning.  Dose: 48 mg     Fiasp FlexTouch 100 UNIT/ML Sopn  Generic drug: Insulin Aspart (w/Niacinamide)   Inject 2-10 Units under the skin 3 times a day before meals. 1 unit for every 50 above 150, unknown sliding scale.  Dose: 2-10 Units     FreeStyle John 3 Sensor Misc   Doctor's comments: She was not able to use the Dexcom due to arthritis  1 Each every 14 days.  Dose: 1 Each     furosemide 40 MG Tabs  Commonly known as: Lasix   Take 2 Tablets by mouth 2 times a day.  Dose: 80 mg     liothyronine 5 MCG Tabs  Commonly known as: Cytomel   Take 1 Tablet by mouth every day.  Dose: 5 mcg     omeprazole 40 MG delayed-release capsule  Commonly known as: PriLOSEC   Take 40 mg by mouth every day.  Dose: 40 mg     ondansetron 8 MG Tbdp  Commonly known as: Zofran ODT   Take 8 mg by mouth every 8 hours as needed.  Dose: 8 mg     oxycodone 15 MG immediate release tablet  Commonly known as: Oxy-IR   Take 15 mg by mouth every four hours as needed for Severe Pain.  Dose: 15 mg     potassium chloride 10 MEQ capsule  Commonly known as: Micro-K   Take 1 Capsule by mouth every morning.  Dose: 1 Capsule     Senokot S 8.6-50 MG Tabs  Generic drug: senna-docusate   Take 1 Tablet by mouth every day.  Dose: 1 Tablet     Synthroid 125 MCG Tabs  Generic drug: levothyroxine   Take 2 Tablets by mouth every day. Name brand medically necessare  Dose: 250 mcg     traZODone 150 MG Tabs  Commonly known as: Desyrel   Take 150 mg by mouth at bedtime.  Dose: 150 mg     ursodiol 300 MG Caps  Commonly known as: Actigall   Take 300 mg by mouth 3 times a day.  Dose: 300 mg     venlafaxine 150 MG extended-release capsule  Commonly known as: Effexor-XR   Take 150 mg by mouth every day.  Dose: 150 mg     vitamin B-12 CR 1000 MCG Tbcr  tablet  Commonly known as: Cyanocobalamin   Take 1,000 mcg by mouth every day.  Dose: 1,000 mcg     Vitamin D (Ergocalciferol) 50 MCG (2000 UT) Caps   Take 2,000 Units by mouth 2 times a day.  Dose: 2,000 Units            STOP taking these medications      Toujeo SoloStar 300 UNIT/ML Sopn  Generic drug: Insulin Glargine (1 Unit Dial)  Replaced by: Lantus SoloStar 100 UNIT/ML Sopn injection            ASK your doctor about these medications        Instructions   riFAMPin 300 MG Caps  Commonly known as: Rifadine   Take 300 Capsules by mouth 2 times a day.  Dose: 300 Capsule              Allergies  Allergies   Allergen Reactions    Tape Unspecified and Rash     Blisters, paper tape is ok  Other reaction(s): Rash       DIET  Orders Placed This Encounter   Procedures    Diet Order Diet: Regular     Standing Status:   Standing     Number of Occurrences:   1     Order Specific Question:   Diet:     Answer:   Regular [1]       ACTIVITY  As tolerated.  Weight bearing as tolerated    CONSULTATIONS  Nephrology    PROCEDURES  No procedures were performed during this hospitalization.    LABORATORY  Lab Results   Component Value Date    SODIUM 134 (L) 03/01/2024    POTASSIUM 4.0 03/01/2024    CHLORIDE 100 03/01/2024    CO2 18 (L) 03/01/2024    GLUCOSE 232 (H) 03/01/2024    BUN 52 (H) 03/01/2024    CREATININE 2.58 (H) 03/01/2024    CREATININE 1.0 01/08/2009        Lab Results   Component Value Date    WBC 10.1 03/01/2024    HEMOGLOBIN 9.6 (L) 03/01/2024    HEMATOCRIT 28.8 (L) 03/01/2024    PLATELETCT 281 03/01/2024        Total time of the discharge process exceeds 32 minutes.

## 2024-03-01 NOTE — ASSESSMENT & PLAN NOTE
Diagnosed a week prior to admission by palliative nurse and put on oxygen 2 L at home.  Not using any inhalers at home, no PFTs, no cough/sputum, no exacerbations.  May be GOLD B.  Will start empiric treatment with LABA/LAMA.  0.5 L oxygen via nasal cannula for comfort.  Outpatient PFTs and pulmonary consult.

## 2024-03-01 NOTE — PROGRESS NOTES
Monitor Summary    Rhythm: SR  Rate: 70's-80's  Measurements: .16/.08/.40  Ectopy: PVC (F)

## 2024-03-04 LAB
GLUCOSE BLD STRIP.AUTO-MCNC: 188 MG/DL (ref 65–99)
GLUCOSE BLD STRIP.AUTO-MCNC: 226 MG/DL (ref 65–99)
GLUCOSE BLD STRIP.AUTO-MCNC: 359 MG/DL (ref 65–99)

## 2024-03-13 ENCOUNTER — HOSPITAL ENCOUNTER (OUTPATIENT)
Dept: LAB | Facility: MEDICAL CENTER | Age: 69
End: 2024-03-13
Attending: INTERNAL MEDICINE
Payer: MEDICARE

## 2024-03-13 DIAGNOSIS — I10 ESSENTIAL HYPERTENSION: ICD-10-CM

## 2024-03-13 DIAGNOSIS — E10.9 TYPE 1 DIABETES MELLITUS ON INSULIN THERAPY (HCC): ICD-10-CM

## 2024-03-13 DIAGNOSIS — R60.9 EDEMA, UNSPECIFIED TYPE: ICD-10-CM

## 2024-03-13 DIAGNOSIS — N18.4 STAGE 4 CHRONIC KIDNEY DISEASE (HCC): ICD-10-CM

## 2024-03-13 LAB
ERYTHROCYTE [DISTWIDTH] IN BLOOD BY AUTOMATED COUNT: 60.8 FL (ref 35.9–50)
HCT VFR BLD AUTO: 28.6 % (ref 37–47)
HGB BLD-MCNC: 9 G/DL (ref 12–16)
MCH RBC QN AUTO: 29 PG (ref 27–33)
MCHC RBC AUTO-ENTMCNC: 31.5 G/DL (ref 32.2–35.5)
MCV RBC AUTO: 92.3 FL (ref 81.4–97.8)
PLATELET # BLD AUTO: 293 K/UL (ref 164–446)
PMV BLD AUTO: 10.9 FL (ref 9–12.9)
RBC # BLD AUTO: 3.1 M/UL (ref 4.2–5.4)
WBC # BLD AUTO: 7.6 K/UL (ref 4.8–10.8)

## 2024-03-13 PROCEDURE — 36415 COLL VENOUS BLD VENIPUNCTURE: CPT

## 2024-03-13 PROCEDURE — 82043 UR ALBUMIN QUANTITATIVE: CPT

## 2024-03-13 PROCEDURE — 85027 COMPLETE CBC AUTOMATED: CPT

## 2024-03-13 PROCEDURE — 80048 BASIC METABOLIC PNL TOTAL CA: CPT

## 2024-03-13 PROCEDURE — 82570 ASSAY OF URINE CREATININE: CPT

## 2024-03-14 LAB
ANION GAP SERPL CALC-SCNC: 14 MMOL/L (ref 7–16)
BUN SERPL-MCNC: 55 MG/DL (ref 8–22)
CALCIUM SERPL-MCNC: 8.9 MG/DL (ref 8.5–10.5)
CHLORIDE SERPL-SCNC: 104 MMOL/L (ref 96–112)
CO2 SERPL-SCNC: 19 MMOL/L (ref 20–33)
CREAT SERPL-MCNC: 2.44 MG/DL (ref 0.5–1.4)
CREAT UR-MCNC: 39.17 MG/DL
GFR SERPLBLD CREATININE-BSD FMLA CKD-EPI: 21 ML/MIN/1.73 M 2
GLUCOSE SERPL-MCNC: 65 MG/DL (ref 65–99)
MICROALBUMIN UR-MCNC: 165.2 MG/DL
MICROALBUMIN/CREAT UR: 4218 MG/G (ref 0–30)
POTASSIUM SERPL-SCNC: 4 MMOL/L (ref 3.6–5.5)
SODIUM SERPL-SCNC: 137 MMOL/L (ref 135–145)

## 2024-03-20 ENCOUNTER — OFFICE VISIT (OUTPATIENT)
Dept: NEPHROLOGY | Facility: MEDICAL CENTER | Age: 69
End: 2024-03-20
Payer: MEDICARE

## 2024-03-20 ENCOUNTER — HOSPITAL ENCOUNTER (INPATIENT)
Facility: MEDICAL CENTER | Age: 69
LOS: 6 days | DRG: 673 | End: 2024-03-26
Attending: EMERGENCY MEDICINE | Admitting: STUDENT IN AN ORGANIZED HEALTH CARE EDUCATION/TRAINING PROGRAM
Payer: MEDICARE

## 2024-03-20 ENCOUNTER — APPOINTMENT (OUTPATIENT)
Dept: RADIOLOGY | Facility: MEDICAL CENTER | Age: 69
DRG: 673 | End: 2024-03-20
Attending: EMERGENCY MEDICINE
Payer: MEDICARE

## 2024-03-20 VITALS
DIASTOLIC BLOOD PRESSURE: 88 MMHG | SYSTOLIC BLOOD PRESSURE: 138 MMHG | HEART RATE: 75 BPM | WEIGHT: 164 LBS | BODY MASS INDEX: 26.36 KG/M2 | HEIGHT: 66 IN | OXYGEN SATURATION: 98 % | TEMPERATURE: 97.6 F

## 2024-03-20 DIAGNOSIS — R06.02 SHORTNESS OF BREATH: ICD-10-CM

## 2024-03-20 DIAGNOSIS — R60.0 LOWER EXTREMITY EDEMA: ICD-10-CM

## 2024-03-20 DIAGNOSIS — J90 PLEURAL EFFUSION, BILATERAL: ICD-10-CM

## 2024-03-20 DIAGNOSIS — R60.1 ANASARCA: ICD-10-CM

## 2024-03-20 DIAGNOSIS — N18.6 ESRD NEEDING DIALYSIS (HCC): ICD-10-CM

## 2024-03-20 DIAGNOSIS — N18.4 STAGE 4 CHRONIC KIDNEY DISEASE (HCC): ICD-10-CM

## 2024-03-20 DIAGNOSIS — N17.9 ACUTE RENAL FAILURE SUPERIMPOSED ON STAGE 4 CHRONIC KIDNEY DISEASE, UNSPECIFIED ACUTE RENAL FAILURE TYPE (HCC): ICD-10-CM

## 2024-03-20 DIAGNOSIS — E87.70 HYPERVOLEMIA, UNSPECIFIED HYPERVOLEMIA TYPE: ICD-10-CM

## 2024-03-20 DIAGNOSIS — I10 ESSENTIAL HYPERTENSION: ICD-10-CM

## 2024-03-20 DIAGNOSIS — N18.4 ACUTE RENAL FAILURE SUPERIMPOSED ON STAGE 4 CHRONIC KIDNEY DISEASE, UNSPECIFIED ACUTE RENAL FAILURE TYPE (HCC): ICD-10-CM

## 2024-03-20 DIAGNOSIS — R60.9 EDEMA, UNSPECIFIED TYPE: ICD-10-CM

## 2024-03-20 DIAGNOSIS — Z99.2 ESRD NEEDING DIALYSIS (HCC): ICD-10-CM

## 2024-03-20 PROBLEM — F32.1 CURRENT MODERATE EPISODE OF MAJOR DEPRESSIVE DISORDER (HCC): Status: RESOLVED | Noted: 2021-10-17 | Resolved: 2024-03-20

## 2024-03-20 PROBLEM — F32.9 MDD (MAJOR DEPRESSIVE DISORDER): Status: ACTIVE | Noted: 2024-03-20

## 2024-03-20 LAB
ALBUMIN SERPL BCP-MCNC: 3.2 G/DL (ref 3.2–4.9)
ALBUMIN/GLOB SERPL: 0.6 G/DL
ALP SERPL-CCNC: 786 U/L (ref 30–99)
ALT SERPL-CCNC: 33 U/L (ref 2–50)
ANION GAP SERPL CALC-SCNC: 14 MMOL/L (ref 7–16)
AST SERPL-CCNC: 64 U/L (ref 12–45)
BASOPHILS # BLD AUTO: 1.2 % (ref 0–1.8)
BASOPHILS # BLD: 0.12 K/UL (ref 0–0.12)
BILIRUB SERPL-MCNC: 0.5 MG/DL (ref 0.1–1.5)
BUN SERPL-MCNC: 38 MG/DL (ref 8–22)
CALCIUM ALBUM COR SERPL-MCNC: 9.1 MG/DL (ref 8.5–10.5)
CALCIUM SERPL-MCNC: 8.5 MG/DL (ref 8.5–10.5)
CHLORIDE SERPL-SCNC: 107 MMOL/L (ref 96–112)
CO2 SERPL-SCNC: 17 MMOL/L (ref 20–33)
CREAT SERPL-MCNC: 2.4 MG/DL (ref 0.5–1.4)
EKG IMPRESSION: NORMAL
EOSINOPHIL # BLD AUTO: 0.19 K/UL (ref 0–0.51)
EOSINOPHIL NFR BLD: 1.9 % (ref 0–6.9)
ERYTHROCYTE [DISTWIDTH] IN BLOOD BY AUTOMATED COUNT: 61.1 FL (ref 35.9–50)
GFR SERPLBLD CREATININE-BSD FMLA CKD-EPI: 22 ML/MIN/1.73 M 2
GLOBULIN SER CALC-MCNC: 5.2 G/DL (ref 1.9–3.5)
GLUCOSE BLD STRIP.AUTO-MCNC: 100 MG/DL (ref 65–99)
GLUCOSE SERPL-MCNC: 119 MG/DL (ref 65–99)
HCT VFR BLD AUTO: 27.1 % (ref 37–47)
HGB BLD-MCNC: 8.8 G/DL (ref 12–16)
IMM GRANULOCYTES # BLD AUTO: 0.04 K/UL (ref 0–0.11)
IMM GRANULOCYTES NFR BLD AUTO: 0.4 % (ref 0–0.9)
LYMPHOCYTES # BLD AUTO: 0.66 K/UL (ref 1–4.8)
LYMPHOCYTES NFR BLD: 6.6 % (ref 22–41)
MCH RBC QN AUTO: 29.3 PG (ref 27–33)
MCHC RBC AUTO-ENTMCNC: 32.5 G/DL (ref 32.2–35.5)
MCV RBC AUTO: 90.3 FL (ref 81.4–97.8)
MONOCYTES # BLD AUTO: 1.03 K/UL (ref 0–0.85)
MONOCYTES NFR BLD AUTO: 10.2 % (ref 0–13.4)
NEUTROPHILS # BLD AUTO: 8.01 K/UL (ref 1.82–7.42)
NEUTROPHILS NFR BLD: 79.7 % (ref 44–72)
NRBC # BLD AUTO: 0 K/UL
NRBC BLD-RTO: 0 /100 WBC (ref 0–0.2)
NT-PROBNP SERPL IA-MCNC: 4284 PG/ML (ref 0–125)
PLATELET # BLD AUTO: 289 K/UL (ref 164–446)
PMV BLD AUTO: 11.2 FL (ref 9–12.9)
POTASSIUM SERPL-SCNC: 5 MMOL/L (ref 3.6–5.5)
PROT SERPL-MCNC: 8.4 G/DL (ref 6–8.2)
RBC # BLD AUTO: 3 M/UL (ref 4.2–5.4)
SODIUM SERPL-SCNC: 138 MMOL/L (ref 135–145)
T4 FREE SERPL-MCNC: 0.99 NG/DL (ref 0.93–1.7)
TROPONIN T SERPL-MCNC: 82 NG/L (ref 6–19)
TSH SERPL DL<=0.005 MIU/L-ACNC: 40.9 UIU/ML (ref 0.38–5.33)
WBC # BLD AUTO: 10.1 K/UL (ref 4.8–10.8)

## 2024-03-20 PROCEDURE — 770020 HCHG ROOM/CARE - TELE (206)

## 2024-03-20 PROCEDURE — 83880 ASSAY OF NATRIURETIC PEPTIDE: CPT

## 2024-03-20 PROCEDURE — 85025 COMPLETE CBC W/AUTO DIFF WBC: CPT

## 2024-03-20 PROCEDURE — 80053 COMPREHEN METABOLIC PANEL: CPT

## 2024-03-20 PROCEDURE — 84484 ASSAY OF TROPONIN QUANT: CPT

## 2024-03-20 PROCEDURE — 93005 ELECTROCARDIOGRAM TRACING: CPT | Performed by: EMERGENCY MEDICINE

## 2024-03-20 PROCEDURE — 71045 X-RAY EXAM CHEST 1 VIEW: CPT

## 2024-03-20 PROCEDURE — 99497 ADVNCD CARE PLAN 30 MIN: CPT | Performed by: STUDENT IN AN ORGANIZED HEALTH CARE EDUCATION/TRAINING PROGRAM

## 2024-03-20 PROCEDURE — 93005 ELECTROCARDIOGRAM TRACING: CPT

## 2024-03-20 PROCEDURE — 3075F SYST BP GE 130 - 139MM HG: CPT | Performed by: INTERNAL MEDICINE

## 2024-03-20 PROCEDURE — 99285 EMERGENCY DEPT VISIT HI MDM: CPT

## 2024-03-20 PROCEDURE — 96374 THER/PROPH/DIAG INJ IV PUSH: CPT

## 2024-03-20 PROCEDURE — 82962 GLUCOSE BLOOD TEST: CPT

## 2024-03-20 PROCEDURE — 99215 OFFICE O/P EST HI 40 MIN: CPT | Performed by: INTERNAL MEDICINE

## 2024-03-20 PROCEDURE — 36415 COLL VENOUS BLD VENIPUNCTURE: CPT

## 2024-03-20 PROCEDURE — 84439 ASSAY OF FREE THYROXINE: CPT

## 2024-03-20 PROCEDURE — 3079F DIAST BP 80-89 MM HG: CPT | Performed by: INTERNAL MEDICINE

## 2024-03-20 PROCEDURE — 84443 ASSAY THYROID STIM HORMONE: CPT

## 2024-03-20 PROCEDURE — 700111 HCHG RX REV CODE 636 W/ 250 OVERRIDE (IP): Performed by: STUDENT IN AN ORGANIZED HEALTH CARE EDUCATION/TRAINING PROGRAM

## 2024-03-20 PROCEDURE — A9270 NON-COVERED ITEM OR SERVICE: HCPCS | Performed by: STUDENT IN AN ORGANIZED HEALTH CARE EDUCATION/TRAINING PROGRAM

## 2024-03-20 PROCEDURE — 99223 1ST HOSP IP/OBS HIGH 75: CPT | Mod: 25,AI | Performed by: STUDENT IN AN ORGANIZED HEALTH CARE EDUCATION/TRAINING PROGRAM

## 2024-03-20 PROCEDURE — 700102 HCHG RX REV CODE 250 W/ 637 OVERRIDE(OP): Performed by: STUDENT IN AN ORGANIZED HEALTH CARE EDUCATION/TRAINING PROGRAM

## 2024-03-20 RX ORDER — FUROSEMIDE 40 MG/1
40 TABLET ORAL DAILY
Status: ON HOLD | COMMUNITY
End: 2024-03-26

## 2024-03-20 RX ORDER — TRAZODONE HYDROCHLORIDE 50 MG/1
100 TABLET ORAL
Status: DISCONTINUED | OUTPATIENT
Start: 2024-03-20 | End: 2024-03-26 | Stop reason: HOSPADM

## 2024-03-20 RX ORDER — ACETAMINOPHEN 325 MG/1
650 TABLET ORAL EVERY 6 HOURS PRN
Status: DISCONTINUED | OUTPATIENT
Start: 2024-03-20 | End: 2024-03-26 | Stop reason: HOSPADM

## 2024-03-20 RX ORDER — LEVOTHYROXINE SODIUM 0.12 MG/1
250 TABLET ORAL
Status: DISCONTINUED | OUTPATIENT
Start: 2024-03-21 | End: 2024-03-26 | Stop reason: HOSPADM

## 2024-03-20 RX ORDER — LIOTHYRONINE SODIUM 5 UG/1
5 TABLET ORAL DAILY
Status: DISCONTINUED | OUTPATIENT
Start: 2024-03-21 | End: 2024-03-26 | Stop reason: HOSPADM

## 2024-03-20 RX ORDER — METOPROLOL SUCCINATE 100 MG/1
200 TABLET, EXTENDED RELEASE ORAL DAILY
Status: DISCONTINUED | OUTPATIENT
Start: 2024-03-21 | End: 2024-03-26 | Stop reason: HOSPADM

## 2024-03-20 RX ORDER — VENLAFAXINE HYDROCHLORIDE 75 MG/1
150 CAPSULE, EXTENDED RELEASE ORAL DAILY
Status: DISCONTINUED | OUTPATIENT
Start: 2024-03-21 | End: 2024-03-26 | Stop reason: HOSPADM

## 2024-03-20 RX ORDER — INSULIN GLARGINE 100 [IU]/ML
5 INJECTION, SOLUTION SUBCUTANEOUS EVERY EVENING
COMMUNITY
End: 2024-03-29

## 2024-03-20 RX ORDER — INSULIN LISPRO 100 [IU]/ML
1-6 INJECTION, SOLUTION INTRAVENOUS; SUBCUTANEOUS
Status: DISCONTINUED | OUTPATIENT
Start: 2024-03-20 | End: 2024-03-26 | Stop reason: HOSPADM

## 2024-03-20 RX ORDER — NICOTINE 21 MG/24HR
14 PATCH, TRANSDERMAL 24 HOURS TRANSDERMAL
Status: DISCONTINUED | OUTPATIENT
Start: 2024-03-21 | End: 2024-03-26 | Stop reason: HOSPADM

## 2024-03-20 RX ORDER — LEVOTHYROXINE SODIUM 0.12 MG/1
250 TABLET ORAL
COMMUNITY
End: 2024-04-01 | Stop reason: SDUPTHER

## 2024-03-20 RX ORDER — LABETALOL HYDROCHLORIDE 5 MG/ML
10 INJECTION, SOLUTION INTRAVENOUS EVERY 4 HOURS PRN
Status: DISCONTINUED | OUTPATIENT
Start: 2024-03-20 | End: 2024-03-26 | Stop reason: HOSPADM

## 2024-03-20 RX ORDER — FENOFIBRATE 48 MG/1
48 TABLET, COATED ORAL EVERY MORNING
Status: DISCONTINUED | OUTPATIENT
Start: 2024-03-21 | End: 2024-03-20

## 2024-03-20 RX ORDER — ALBUTEROL SULFATE 90 UG/1
2 AEROSOL, METERED RESPIRATORY (INHALATION) EVERY 6 HOURS PRN
Status: DISCONTINUED | OUTPATIENT
Start: 2024-03-20 | End: 2024-03-26 | Stop reason: HOSPADM

## 2024-03-20 RX ORDER — ATORVASTATIN CALCIUM 40 MG/1
40 TABLET, FILM COATED ORAL NIGHTLY
Status: DISCONTINUED | OUTPATIENT
Start: 2024-03-20 | End: 2024-03-26 | Stop reason: HOSPADM

## 2024-03-20 RX ORDER — METOPROLOL SUCCINATE 50 MG/1
200 TABLET, EXTENDED RELEASE ORAL DAILY
COMMUNITY

## 2024-03-20 RX ORDER — AMLODIPINE BESYLATE 5 MG/1
5 TABLET ORAL DAILY
Status: DISCONTINUED | OUTPATIENT
Start: 2024-03-21 | End: 2024-03-26 | Stop reason: HOSPADM

## 2024-03-20 RX ORDER — AMLODIPINE BESYLATE 5 MG/1
5 TABLET ORAL DAILY
COMMUNITY

## 2024-03-20 RX ORDER — ALBUTEROL SULFATE 90 UG/1
2 AEROSOL, METERED RESPIRATORY (INHALATION) EVERY 6 HOURS PRN
COMMUNITY

## 2024-03-20 RX ORDER — OXYCODONE HYDROCHLORIDE 15 MG/1
15 TABLET ORAL EVERY 4 HOURS PRN
Status: DISCONTINUED | OUTPATIENT
Start: 2024-03-20 | End: 2024-03-26 | Stop reason: HOSPADM

## 2024-03-20 RX ORDER — INSULIN LISPRO 100 [IU]/ML
0.2 INJECTION, SOLUTION INTRAVENOUS; SUBCUTANEOUS
Status: DISCONTINUED | OUTPATIENT
Start: 2024-03-21 | End: 2024-03-26 | Stop reason: HOSPADM

## 2024-03-20 RX ADMIN — ATORVASTATIN CALCIUM 40 MG: 40 TABLET, FILM COATED ORAL at 21:13

## 2024-03-20 RX ADMIN — LABETALOL HYDROCHLORIDE 10 MG: 5 INJECTION INTRAVENOUS at 20:16

## 2024-03-20 ASSESSMENT — ENCOUNTER SYMPTOMS
COUGH: 0
WEAKNESS: 1
SHORTNESS OF BREATH: 1
CHILLS: 0
MUSCULOSKELETAL NEGATIVE: 1
RESPIRATORY NEGATIVE: 1
VOMITING: 0
GASTROINTESTINAL NEGATIVE: 1
EYES NEGATIVE: 1
FEVER: 0
NAUSEA: 0
PSYCHIATRIC NEGATIVE: 1
HYPERTENSION: 1

## 2024-03-20 ASSESSMENT — COGNITIVE AND FUNCTIONAL STATUS - GENERAL
MOBILITY SCORE: 22
DAILY ACTIVITIY SCORE: 24
WALKING IN HOSPITAL ROOM: A LITTLE
CLIMB 3 TO 5 STEPS WITH RAILING: A LITTLE
SUGGESTED CMS G CODE MODIFIER DAILY ACTIVITY: CH
SUGGESTED CMS G CODE MODIFIER MOBILITY: CJ

## 2024-03-20 ASSESSMENT — LIFESTYLE VARIABLES
HAVE YOU EVER FELT YOU SHOULD CUT DOWN ON YOUR DRINKING: NO
EVER HAD A DRINK FIRST THING IN THE MORNING TO STEADY YOUR NERVES TO GET RID OF A HANGOVER: NO
ALCOHOL_USE: NO
TOTAL SCORE: 0
AVERAGE NUMBER OF DAYS PER WEEK YOU HAVE A DRINK CONTAINING ALCOHOL: 0
TOTAL SCORE: 0
ON A TYPICAL DAY WHEN YOU DRINK ALCOHOL HOW MANY DRINKS DO YOU HAVE: 0
EVER FELT BAD OR GUILTY ABOUT YOUR DRINKING: NO
HOW MANY TIMES IN THE PAST YEAR HAVE YOU HAD 5 OR MORE DRINKS IN A DAY: 0
TOTAL SCORE: 0
CONSUMPTION TOTAL: NEGATIVE
HAVE PEOPLE ANNOYED YOU BY CRITICIZING YOUR DRINKING: NO

## 2024-03-20 ASSESSMENT — FIBROSIS 4 INDEX
FIB4 SCORE: 3.06
FIB4 SCORE: 3.06
FIB4 SCORE: 2.54
FIB4 SCORE: 2.54

## 2024-03-20 ASSESSMENT — PAIN DESCRIPTION - PAIN TYPE: TYPE: ACUTE PAIN

## 2024-03-20 NOTE — PROGRESS NOTES
"Subjective     Asha Manuel is a 68 y.o. female who presents with Hypertension and Chronic Kidney Disease            Hypertension  This is a chronic problem. The current episode started more than 1 year ago. The problem has been waxing and waning since onset. The problem is uncontrolled. Associated symptoms include peripheral edema and shortness of breath. Pertinent negatives include no chest pain or malaise/fatigue. Risk factors for coronary artery disease include post-menopausal state and diabetes mellitus. Past treatments include diuretics and calcium channel blockers. The current treatment provides moderate improvement. Compliance problems include diet.  Hypertensive end-organ damage includes kidney disease. Identifiable causes of hypertension include chronic renal disease.   Chronic Kidney Disease  This is a chronic problem. The current episode started more than 1 year ago. The problem occurs constantly. The problem has been waxing and waning. Pertinent negatives include no chest pain, chills, coughing, fever, nausea, urinary symptoms or vomiting.       Review of Systems   Constitutional:  Negative for chills, fever and malaise/fatigue.   Respiratory:  Positive for shortness of breath. Negative for cough.    Cardiovascular:  Positive for leg swelling. Negative for chest pain.   Gastrointestinal:  Negative for nausea and vomiting.   Genitourinary:  Negative for dysuria, frequency and urgency.              Objective     /88 (BP Location: Right arm, Patient Position: Sitting, BP Cuff Size: Adult)   Pulse 75   Temp 36.4 °C (97.6 °F) (Temporal)   Ht 1.676 m (5' 6\")   Wt 74.4 kg (164 lb)   LMP 04/29/2005 (LMP Unknown)   SpO2 98%   BMI 26.47 kg/m²      Physical Exam  Vitals and nursing note reviewed.   Constitutional:       General: She is not in acute distress.     Appearance: Normal appearance. She is well-developed. She is ill-appearing. She is not diaphoretic.   HENT:      Head: Normocephalic " "and atraumatic.      Right Ear: External ear normal.      Left Ear: External ear normal.      Nose: Nose normal.   Eyes:      General: No scleral icterus.        Right eye: No discharge.         Left eye: No discharge.      Conjunctiva/sclera: Conjunctivae normal.   Cardiovascular:      Rate and Rhythm: Normal rate and regular rhythm.      Heart sounds: No murmur heard.  Pulmonary:      Effort: Pulmonary effort is normal. No respiratory distress.      Breath sounds: Normal breath sounds.   Musculoskeletal:         General: No tenderness.      Right lower leg: Edema present.      Left lower leg: Edema present.   Skin:     General: Skin is warm and dry.      Findings: No erythema.   Neurological:      General: No focal deficit present.      Mental Status: She is alert and oriented to person, place, and time.      Cranial Nerves: No cranial nerve deficit.   Psychiatric:         Mood and Affect: Mood normal.         Behavior: Behavior normal.       Past Medical History:   Diagnosis Date    Adverse effect of anesthesia     in 2008 \"throat closes up\"\"anxiety\" ?laryngospasm, kept in ICU. Pt states no problems currently 2018.     Anemia     Anesthesia     in 2008 \"throat closes up\"\"anxiety\" ?laryngospasm, kept in ICU. Pt states no problems currently 2018.     Arthritis     right shoulder, hands    Bowel habit changes More frequent    Cataract 2022    Cigarette smoker quit 2013    Dental disorder     missing teeth; Partial plate on top    depression 08/30/2016    denies depression, states has anxiety and panic attacks    Diabetes mellitus type 1 (HCC) 1989    insulin    Dialysis patient (HCC) Short time due to a kidney injury from a infection    Encounter for long-term (current) use of insulin (HCC) 09/25/2013    GI bleed     Heart burn     High cholesterol     Hypertension     Hypothyroidism, postsurgical 1970    Indigestion     Infectious disease      had hepatitis C, tested neg.    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) 06/10/2015     Social History     Socioeconomic History    Marital status:      Spouse name: Not on file    Number of children: Not on file    Years of education: Not on file    Highest education level: Not on file   Occupational History    Not on file   Tobacco Use    Smoking status: Every Day     Current packs/day: 0.50     Average packs/day: 0.5 packs/day for 30.0 years (15.0 ttl pk-yrs)     Types: Cigarettes    Smokeless tobacco: Never   Vaping Use    Vaping Use: Never used   Substance and Sexual Activity    Alcohol use: Not Currently    Drug use: Not Currently     Types: Inhaled, Oral, Marijuana     Comment: Marijuana - Occasional; edibles    Sexual activity: Not Currently   Other Topics Concern    Not on file   Social History Narrative    Not on file     Social Determinants of Health     Financial Resource Strain: Low Risk  (11/1/2021)    Overall Financial Resource Strain (CARDIA)     Difficulty of Paying Living Expenses: Not very hard   Food Insecurity: No Food Insecurity (11/1/2021)    Hunger Vital Sign     Worried About Running Out of Food in the Last Year: Never true     Ran Out of Food in the Last Year: Never true   Transportation Needs: No Transportation Needs (11/1/2021)    PRAPARE - Transportation     Lack of Transportation (Medical): No     Lack of Transportation (Non-Medical): No   Physical Activity: Inactive (6/23/2020)    Exercise Vital Sign     Days of Exercise per Week: 0 days     Minutes of Exercise per Session: 0 min   Stress: No Stress Concern Present (6/23/2020)    [employer] - Occupational Stress Questionnaire     Feeling of Stress : Not at all   Social " Connections: Moderately Isolated (6/23/2020)    Social Connection and Isolation Panel [NHANES]     Frequency of Communication with Friends and Family: More than three times a week     Frequency of Social Gatherings with Friends and Family: More than three times a week     Attends Hoahaoism Services: Never     Active Member of Clubs or Organizations: No     Attends Club or Organization Meetings: Never     Marital Status:    Intimate Partner Violence: Not At Risk (6/23/2020)    Humiliation, Afraid, Rape, and Kick questionnaire     Fear of Current or Ex-Partner: No     Emotionally Abused: No     Physically Abused: No     Sexually Abused: No   Housing Stability: Not on file     Family History   Problem Relation Age of Onset    Hypertension Mother     Cancer Father      Recent Labs     08/17/23  0850 09/18/23  1447 02/27/24  2329 02/29/24  0219 02/29/24  1048 03/01/24  0549 03/13/24  1422   ALBUMIN 3.2   < > 3.0* 2.4*  --  2.8*  --      --   --   --   --   --   --    TRIGLYCERIDE 52  --   --   --   --   --   --    SODIUM 139   < > 135 135 135 134* 137   POTASSIUM 4.3   < > 4.4 4.0 4.5 4.0 4.0   CHLORIDE 103   < > 97 98 98 100 104   CO2 25   < > 22 19* 20 18* 19*   BUN 25*   < > 51* 56* 56* 52* 55*   CREATININE 1.43*   < > 3.44* 3.00* 2.97* 2.58* 2.44*   PHOSPHORUS  --   --  7.1* 5.9*  --   --   --     < > = values in this interval not displayed.                           Assessment & Plan        1. Essential hypertension  Uncontrolled  Patient has significant volume overload  I do believe we need to start dialysis to manage her hypertension and volume overload  - Referral to [facility]    2. Stage 4 chronic kidney disease (HCC)  Patient has significant volume overload, mild uremic symptoms  I do believe patient needs to start dialysis to manage uremia and volume overload  - Referral to [facility]    3. Edema, unspecified type  Start dialysis  - Referral to [facility]  Department    4. Shortness of breath  Needs to start dialysis

## 2024-03-20 NOTE — ED TRIAGE NOTES
"Chief Complaint   Patient presents with    Sent by MD     Pt sent by MD to start dialysis. Hx of stage 4 kidney disease. + 40 lb weight gain.    Shortness of Breath     BP (!) 142/77   Pulse 75   Temp 36.1 °C (97 °F) (Temporal)   Resp 16   Ht 1.676 m (5' 6\")   Wt 74.8 kg (165 lb)   LMP 04/29/2005 (LMP Unknown)   SpO2 97%   BMI 26.63 kg/m²     Pt brought into triage by WC. Educated on triage process. Instructed to notify staff for any worsening symptoms.  "

## 2024-03-21 ENCOUNTER — APPOINTMENT (OUTPATIENT)
Dept: RADIOLOGY | Facility: MEDICAL CENTER | Age: 69
DRG: 673 | End: 2024-03-21
Attending: STUDENT IN AN ORGANIZED HEALTH CARE EDUCATION/TRAINING PROGRAM
Payer: MEDICARE

## 2024-03-21 ENCOUNTER — APPOINTMENT (OUTPATIENT)
Dept: CARDIOLOGY | Facility: MEDICAL CENTER | Age: 69
DRG: 673 | End: 2024-03-21
Attending: STUDENT IN AN ORGANIZED HEALTH CARE EDUCATION/TRAINING PROGRAM
Payer: MEDICARE

## 2024-03-21 PROBLEM — N18.6 ESRD NEEDING DIALYSIS (HCC): Status: ACTIVE | Noted: 2024-03-20

## 2024-03-21 PROBLEM — Z99.2 ESRD NEEDING DIALYSIS (HCC): Status: ACTIVE | Noted: 2024-03-20

## 2024-03-21 LAB
GLUCOSE BLD STRIP.AUTO-MCNC: 138 MG/DL (ref 65–99)
GLUCOSE BLD STRIP.AUTO-MCNC: 247 MG/DL (ref 65–99)
HAV IGM SERPL QL IA: NORMAL
HBV CORE IGM SER QL: NORMAL
HBV SURFACE AB SERPL IA-ACNC: <3.5 MIU/ML (ref 0–10)
HBV SURFACE AG SER QL: NORMAL
HCV AB SER QL: NORMAL
INR PPP: 1.03 (ref 0.87–1.13)
LV EJECT FRACT  99904: 60
LV EJECT FRACT MOD 2C 99903: 65.38
LV EJECT FRACT MOD 4C 99902: 56.53
LV EJECT FRACT MOD BP 99901: 59.46
PROTHROMBIN TIME: 13.6 SEC (ref 12–14.6)

## 2024-03-21 PROCEDURE — 86706 HEP B SURFACE ANTIBODY: CPT

## 2024-03-21 PROCEDURE — 700102 HCHG RX REV CODE 250 W/ 637 OVERRIDE(OP): Performed by: STUDENT IN AN ORGANIZED HEALTH CARE EDUCATION/TRAINING PROGRAM

## 2024-03-21 PROCEDURE — 700111 HCHG RX REV CODE 636 W/ 250 OVERRIDE (IP): Mod: JZ | Performed by: STUDENT IN AN ORGANIZED HEALTH CARE EDUCATION/TRAINING PROGRAM

## 2024-03-21 PROCEDURE — A9270 NON-COVERED ITEM OR SERVICE: HCPCS

## 2024-03-21 PROCEDURE — 36415 COLL VENOUS BLD VENIPUNCTURE: CPT

## 2024-03-21 PROCEDURE — 97535 SELF CARE MNGMENT TRAINING: CPT

## 2024-03-21 PROCEDURE — 93306 TTE W/DOPPLER COMPLETE: CPT

## 2024-03-21 PROCEDURE — 82962 GLUCOSE BLOOD TEST: CPT

## 2024-03-21 PROCEDURE — 85610 PROTHROMBIN TIME: CPT

## 2024-03-21 PROCEDURE — 90935 HEMODIALYSIS ONE EVALUATION: CPT

## 2024-03-21 PROCEDURE — 700111 HCHG RX REV CODE 636 W/ 250 OVERRIDE (IP)

## 2024-03-21 PROCEDURE — C1750 CATH, HEMODIALYSIS,LONG-TERM: HCPCS

## 2024-03-21 PROCEDURE — 0JH63XZ INSERTION OF TUNNELED VASCULAR ACCESS DEVICE INTO CHEST SUBCUTANEOUS TISSUE AND FASCIA, PERCUTANEOUS APPROACH: ICD-10-PCS | Performed by: STUDENT IN AN ORGANIZED HEALTH CARE EDUCATION/TRAINING PROGRAM

## 2024-03-21 PROCEDURE — 99233 SBSQ HOSP IP/OBS HIGH 50: CPT | Performed by: INTERNAL MEDICINE

## 2024-03-21 PROCEDURE — 770020 HCHG ROOM/CARE - TELE (206)

## 2024-03-21 PROCEDURE — 80074 ACUTE HEPATITIS PANEL: CPT

## 2024-03-21 PROCEDURE — 700102 HCHG RX REV CODE 250 W/ 637 OVERRIDE(OP)

## 2024-03-21 PROCEDURE — A9270 NON-COVERED ITEM OR SERVICE: HCPCS | Performed by: STUDENT IN AN ORGANIZED HEALTH CARE EDUCATION/TRAINING PROGRAM

## 2024-03-21 PROCEDURE — 02HV33Z INSERTION OF INFUSION DEVICE INTO SUPERIOR VENA CAVA, PERCUTANEOUS APPROACH: ICD-10-PCS | Performed by: STUDENT IN AN ORGANIZED HEALTH CARE EDUCATION/TRAINING PROGRAM

## 2024-03-21 PROCEDURE — 97162 PT EVAL MOD COMPLEX 30 MIN: CPT

## 2024-03-21 PROCEDURE — 99223 1ST HOSP IP/OBS HIGH 75: CPT | Performed by: INTERNAL MEDICINE

## 2024-03-21 PROCEDURE — 93306 TTE W/DOPPLER COMPLETE: CPT | Mod: 26 | Performed by: INTERNAL MEDICINE

## 2024-03-21 RX ORDER — SODIUM CHLORIDE 9 MG/ML
500 INJECTION, SOLUTION INTRAVENOUS
Status: ACTIVE | OUTPATIENT
Start: 2024-03-21 | End: 2024-03-21

## 2024-03-21 RX ORDER — HEPARIN SODIUM 1000 [USP'U]/ML
INJECTION, SOLUTION INTRAVENOUS; SUBCUTANEOUS
Status: DISPENSED
Start: 2024-03-21 | End: 2024-03-22

## 2024-03-21 RX ORDER — HEPARIN SODIUM 1000 [USP'U]/ML
INJECTION, SOLUTION INTRAVENOUS; SUBCUTANEOUS
Status: COMPLETED
Start: 2024-03-21 | End: 2024-03-21

## 2024-03-21 RX ORDER — ONDANSETRON 2 MG/ML
4 INJECTION INTRAMUSCULAR; INTRAVENOUS PRN
Status: ACTIVE | OUTPATIENT
Start: 2024-03-21 | End: 2024-03-21

## 2024-03-21 RX ORDER — LIDOCAINE HYDROCHLORIDE AND EPINEPHRINE 10; 10 MG/ML; UG/ML
INJECTION, SOLUTION INFILTRATION; PERINEURAL
Status: DISPENSED
Start: 2024-03-21 | End: 2024-03-22

## 2024-03-21 RX ORDER — MIDAZOLAM HYDROCHLORIDE 1 MG/ML
INJECTION INTRAMUSCULAR; INTRAVENOUS
Status: COMPLETED
Start: 2024-03-21 | End: 2024-03-21

## 2024-03-21 RX ORDER — LABETALOL HYDROCHLORIDE 5 MG/ML
INJECTION, SOLUTION INTRAVENOUS
Status: COMPLETED
Start: 2024-03-21 | End: 2024-03-21

## 2024-03-21 RX ORDER — HEPARIN SODIUM 1000 [USP'U]/ML
3600 INJECTION, SOLUTION INTRAVENOUS; SUBCUTANEOUS
Status: DISCONTINUED | OUTPATIENT
Start: 2024-03-21 | End: 2024-03-26 | Stop reason: HOSPADM

## 2024-03-21 RX ORDER — MIDAZOLAM HYDROCHLORIDE 1 MG/ML
.5-2 INJECTION INTRAMUSCULAR; INTRAVENOUS PRN
Status: ACTIVE | OUTPATIENT
Start: 2024-03-21 | End: 2024-03-21

## 2024-03-21 RX ADMIN — MIDAZOLAM HYDROCHLORIDE 2 MG: 1 INJECTION INTRAMUSCULAR; INTRAVENOUS at 13:38

## 2024-03-21 RX ADMIN — UMECLIDINIUM BROMIDE AND VILANTEROL TRIFENATATE 1 PUFF: 62.5; 25 POWDER RESPIRATORY (INHALATION) at 05:13

## 2024-03-21 RX ADMIN — INSULIN LISPRO 5 UNITS: 100 INJECTION, SOLUTION INTRAVENOUS; SUBCUTANEOUS at 18:43

## 2024-03-21 RX ADMIN — INSULIN LISPRO 2 UNITS: 100 INJECTION, SOLUTION INTRAVENOUS; SUBCUTANEOUS at 22:06

## 2024-03-21 RX ADMIN — NICOTINE 14 MG: 14 PATCH TRANSDERMAL at 05:12

## 2024-03-21 RX ADMIN — TRAZODONE HYDROCHLORIDE 100 MG: 100 TABLET ORAL at 00:13

## 2024-03-21 RX ADMIN — FENTANYL CITRATE 50 MCG: 50 INJECTION, SOLUTION INTRAMUSCULAR; INTRAVENOUS at 13:38

## 2024-03-21 RX ADMIN — LEVOTHYROXINE SODIUM 250 MCG: 0.12 TABLET ORAL at 05:12

## 2024-03-21 RX ADMIN — INSULIN LISPRO 2 UNITS: 100 INJECTION, SOLUTION INTRAVENOUS; SUBCUTANEOUS at 18:42

## 2024-03-21 RX ADMIN — MIDAZOLAM HYDROCHLORIDE 2 MG: 2 INJECTION, SOLUTION INTRAMUSCULAR; INTRAVENOUS at 13:38

## 2024-03-21 RX ADMIN — FENTANYL CITRATE 50 MCG: 50 INJECTION, SOLUTION INTRAMUSCULAR; INTRAVENOUS at 13:43

## 2024-03-21 RX ADMIN — HEPARIN SODIUM 3600 UNITS: 1000 INJECTION, SOLUTION INTRAVENOUS; SUBCUTANEOUS at 17:51

## 2024-03-21 RX ADMIN — ALBUTEROL SULFATE 2 PUFF: 90 AEROSOL, METERED RESPIRATORY (INHALATION) at 10:19

## 2024-03-21 RX ADMIN — ATORVASTATIN CALCIUM 40 MG: 40 TABLET, FILM COATED ORAL at 22:04

## 2024-03-21 RX ADMIN — LABETALOL HYDROCHLORIDE 10 MG: 5 INJECTION, SOLUTION INTRAVENOUS at 13:50

## 2024-03-21 RX ADMIN — VENLAFAXINE HYDROCHLORIDE 150 MG: 75 CAPSULE, EXTENDED RELEASE ORAL at 05:13

## 2024-03-21 RX ADMIN — OXYCODONE HYDROCHLORIDE 15 MG: 15 TABLET ORAL at 07:38

## 2024-03-21 RX ADMIN — OXYCODONE HYDROCHLORIDE 15 MG: 15 TABLET ORAL at 23:06

## 2024-03-21 RX ADMIN — ALBUTEROL SULFATE 2 PUFF: 90 AEROSOL, METERED RESPIRATORY (INHALATION) at 00:13

## 2024-03-21 RX ADMIN — AMLODIPINE BESYLATE 5 MG: 5 TABLET ORAL at 05:12

## 2024-03-21 RX ADMIN — LABETALOL HYDROCHLORIDE 10 MG: 5 INJECTION INTRAVENOUS at 13:50

## 2024-03-21 RX ADMIN — OXYCODONE HYDROCHLORIDE 15 MG: 15 TABLET ORAL at 18:45

## 2024-03-21 RX ADMIN — TRAZODONE HYDROCHLORIDE 100 MG: 100 TABLET ORAL at 22:04

## 2024-03-21 RX ADMIN — LIOTHYRONINE SODIUM 5 MCG: 5 TABLET ORAL at 05:12

## 2024-03-21 RX ADMIN — METOPROLOL SUCCINATE 200 MG: 100 TABLET, EXTENDED RELEASE ORAL at 05:12

## 2024-03-21 ASSESSMENT — ENCOUNTER SYMPTOMS
NERVOUS/ANXIOUS: 0
TREMORS: 0
DIZZINESS: 0
SEIZURES: 0
SHORTNESS OF BREATH: 1
BLOOD IN STOOL: 0
EYE PAIN: 0
COUGH: 0
WEAKNESS: 0
MYALGIAS: 0
LOSS OF CONSCIOUSNESS: 0
NAUSEA: 0
FEVER: 0
CONSTIPATION: 0
EYE REDNESS: 0
INSOMNIA: 0
VOMITING: 0
HEADACHES: 0
ABDOMINAL PAIN: 0
WHEEZING: 0
FOCAL WEAKNESS: 0
PALPITATIONS: 0
DIARRHEA: 0
FALLS: 0
CHILLS: 0
HEMOPTYSIS: 0

## 2024-03-21 ASSESSMENT — COGNITIVE AND FUNCTIONAL STATUS - GENERAL
STANDING UP FROM CHAIR USING ARMS: A LITTLE
CLIMB 3 TO 5 STEPS WITH RAILING: A LITTLE
MOBILITY SCORE: 18
MOVING TO AND FROM BED TO CHAIR: A LITTLE
WALKING IN HOSPITAL ROOM: A LITTLE
TURNING FROM BACK TO SIDE WHILE IN FLAT BAD: A LITTLE
SUGGESTED CMS G CODE MODIFIER MOBILITY: CK
MOVING FROM LYING ON BACK TO SITTING ON SIDE OF FLAT BED: A LITTLE

## 2024-03-21 ASSESSMENT — PAIN DESCRIPTION - PAIN TYPE
TYPE: ACUTE PAIN
TYPE: ACUTE PAIN

## 2024-03-21 NOTE — ASSESSMENT & PLAN NOTE
Likely related to uncontrolled diabetes and hypertension   Came with generalized weakness   Subcutaneous dialysis catheter was placed and started with dialysis  Chronic anemia, check iron panel  Monitor phosphorus and calcium  Nephrology on board, appreciate recommendations.

## 2024-03-21 NOTE — PROGRESS NOTES
Pt presents to IR 3. Pt. was consented by MD at bedside, confirmed by this RN and consent at bedside. Pt transferred to procedure table in supine position. Patient underwent a Tunneled HD catheter insertion by Dr. Rico. Procedure site was marked by MD and verified using imaging guidance. Pt placed on monitor, prepped and draped in a sterile fashion. Vitals were taken every 5 minutes and remained stable during procedure (see doc flow sheet for results). CO2 waveform capnography was monitored and remained WNL throughout procedure. Report called to MINI Harrington. Pt transported by stretcher with RN to T822.     Hemodialysis Catheter 14.5 Fr x 23 cm  REF: [id number]  LOT: [id number]  EXP: 2025-08-31

## 2024-03-21 NOTE — PROGRESS NOTES
4 Eyes Skin Assessment Completed by MINI Yanez and MINI Galindo.    Head WDL  Ears WDL  Nose WDL  Mouth WDL  Neck WDL  Breast/Chest Edema  Shoulder Blades WDL  Spine WDL  (R) Arm/Elbow/Hand Redness and Blanching  (L) Arm/Elbow/Hand Redness and Blanching  Abdomen WDL  Groin WDL  Scrotum/Coccyx/Buttocks Blanching  (R) Leg Redness, Blanching, Swelling, and Edema  (L) Leg Redness, Blanching, Swelling, and Edema  (R) Heel/Foot/Toe Redness, Blanching, and Edema  (L) Heel/Foot/Toe Redness, Blanching, and Edema          Devices In Places Tele Box, Blood Pressure Cuff, and Pulse Ox      Interventions In Place Pillows    Possible Skin Injury No    Pictures Uploaded Into Epic N/A  Wound Consult Placed N/A  RN Wound Prevention Protocol Ordered No

## 2024-03-21 NOTE — ASSESSMENT & PLAN NOTE
Resume home medications venlafaxine and trazodone  Patient asked to start with Xanax, discussed the risk from the medication and the risk of addiction, patient understood.  Patient is to follow-up with her psychiatrist as outpatient.
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
## 2024-03-21 NOTE — ASSESSMENT & PLAN NOTE
Continue synthroid 250 mcg daily and Cytomel 5 mcg daily TSH 40    Patient is to follow-up closely with PCP to repeat labs and increase dose of levothyroxine

## 2024-03-21 NOTE — ASSESSMENT & PLAN NOTE
16 minutes spent discussing goals of care with patient.  She was explained that she has fluid overload likely due to her chronic kidney disease and that she will require dialysis for fluid removal.  She was asked because that is, to which he stated she would like to be DNR/DNI, okay for medical treatment.

## 2024-03-21 NOTE — ASSESSMENT & PLAN NOTE
Sliding scale  A1c 8.7  Patient taking Lantus 25 units at home, start with Lantus 10 units at the hospital and adjust the dose if needed

## 2024-03-21 NOTE — DISCHARGE PLANNING
Care Transition Team Assessment  LMSW spoke with pt to conduct assessment and verify demographics. Pt verified address on file is her mailing address though she lives in a different location. Pt lives alone in a SS house with 2 steps to enter. Pt stated that she uses a walker and cane to ambulate. Pt stated that she had been having trouble walking. Pt stated that she was independent with ADLS but needed assistance since last admission on ambulating. Pt stated that she has palliative care following her outpatient. Pt's insurance is [facility]. Upon DC pt will have transportation home.     Information Source  Orientation Level: Oriented X4  Information Given By: Patient  Who is responsible for making decisions for patient? : Patient    Readmission Evaluation  Is this a readmission?: Yes - unplanned readmission    Elopement Risk  Legal Hold: No  Ambulatory or Self Mobile in Wheelchair: Yes  Disoriented: No  Psychiatric Symptoms: None  History of Wandering: No  Elopement this Admit: No  Vocalizing Wanting to Leave: No  Displays Behaviors, Body Language Wanting to Leave: No-Not at Risk for Elopement    Interdisciplinary Discharge Planning  Lives with - Patient's Self Care Capacity: Alone and Able to Care For Self  Patient or legal guardian wants to designate a caregiver: No  Support Systems: Family Member(s)  Housing / Facility: [address] [facility]  Durable Medical Equipment: Not Applicable    Discharge Preparedness  What is your plan after discharge?: Home with help  What are your discharge supports?: Child  Prior Functional Level: Independent with Activities of Daily Living, Uses Cane, Uses Walker  Difficulity with ADLs: Walking  Difficulity with IADLs: None    Functional Assesment  Prior Functional Level: Independent with Activities of Daily Living, Uses Cane, Uses Walker    Finances  Financial Barriers to Discharge: No    Vision / Hearing Impairment  Vision Impairment : Yes  Right Eye Vision: Impaired, Wears Glasses  Left Eye  Vision: Impaired, Wears Glasses  Hearing Impairment : No    Advance Directive  Advance Directive?: None    Domestic Abuse  Have you ever been the victim of abuse or violence?: No  Physical Abuse or Sexual Abuse: No  Verbal Abuse or Emotional Abuse: No  Possible Abuse/Neglect Reported to:: Not Applicable    Psychological Assessment  History of Substance Abuse: None  History of Psychiatric Problems: Yes  Non-compliant with Treatment: No  Newly Diagnosed Illness: No    Anticipated Discharge Information  Discharge Disposition: Discharged to home/self care (01)  Discharge Address: [address] [place] [address]   [place] [id number]  Discharge Contact Phone Number: [phone]

## 2024-03-21 NOTE — OR SURGEON
Immediate Post- Operative Note        Findings: TDC      Procedure(s): TDC Placement      Estimated Blood Loss: Less than 5 ml        Complications: None            3/21/2024     1:46 PM     Josue Rico M.D.

## 2024-03-21 NOTE — CARE PLAN
Problem: Knowledge Deficit - Standard  Goal: Patient and family/care givers will demonstrate understanding of plan of care, disease process/condition, diagnostic tests and medications  3/21/2024 0337 by Renata Rose R.N.  Outcome: Progressing  Note: POC discussed with pt   3/21/2024 0336 by Renata Rose R.N.  Outcome: Progressing     Problem: Psychosocial  Goal: Patient's level of anxiety will decrease  Outcome: Progressing  Note: Offer non-pharm techniques to decrease anxiety      Problem: Hemodynamics  Goal: Patient's hemodynamics, fluid balance and neurologic status will be stable or improve  Outcome: Progressing  Note: Monitor I/O's and vital signs    The patient is Watcher - Medium risk of patient condition declining or worsening    Shift Goals  Clinical Goals: monitor I/O's and BP NPO midnight  Patient Goals: rest and comfort

## 2024-03-21 NOTE — DISCHARGE PLANNING
-1221  Per LMSW, DPA sent [facility] referrals.     -[phone]  DPA received voicemail from Kevin bailey [place] informing DPA that facility can accept pt but if she is on dialysis they cannot.

## 2024-03-21 NOTE — PROGRESS NOTES
Report received from Alejandra ESPINO RN. Updated on POC.  Assumed care of patient upon arrival to unit. Patient currently A & O x 4; on RA ; up Pt placed on monitor, monitor room notified, SR 80. Patient oriented to unit and to call light system. Call light within reach. Pt educated to fall risk. Fall precautions in place. Pt provided with personal grooming items. Bed locked and in lowest position. All questions answered. No other needs indicated at this time.

## 2024-03-21 NOTE — ED NOTES
Medication history reviewed with patient at bedside.   Med rec is complete  Allergies reviewed.   Patient has not had any outpatient antibiotics in the last 30 days.   Anticoagulants: No    Stone Meneses

## 2024-03-21 NOTE — ED NOTES
Ambulated to the restroom with standby assist, gait steady. Back to room placed back on the monitor, pulse oximeter and bp. Maintaining oxygen saturation at 97% on RA.  Per chem panel is hemolyzed. Green top recollected. Erp made aware

## 2024-03-21 NOTE — DISCHARGE PLANNING
Case Management Discharge Planning    Admission Date: 3/20/2024  GMLOS: 3.1  ALOS: 1    6-Clicks ADL Score: 24  6-Clicks Mobility Score: 18      Anticipated Discharge Dispo: Discharge Disposition: Discharged to home/self care (01)  Discharge Address: [address] [place] [address] [place] [address]  Discharge Contact Phone Number: [phone]  Per chart review pt resides in [place], [place]    Family support:  Name Relation Home Work Mobile   Farzaneh Angel Daughter [phone]   Morgan Nunez Brother [phone]   Alyssia Padilla Friend   [phone]   Current Insurance on file: [facility]    DME Needed: pending hospital course    Action(s) Taken: DC Assessment Complete (See below),chart reviewed    Pt discussed during IDT rounds nephrology consulted, possible dialysis.     PASRR Submitted in Manual Review     Escalations Completed: None    Medically Clear: No    Next Steps: f/u with pt and medical team to discuss dc needs and barriers.    Barriers to Discharge: Medical clearance and Pending Procedures

## 2024-03-21 NOTE — DISCHARGE PLANNING
HTH/SCP TCN chart review completed. Collaborated with NEELAM Smith prior to meeting with the pt. The most current review of medical record, knowledge of pt's PLOF and social support, LACE+ score of 77, 6 clicks scores of 22 mobility were considered.      Pt seen at bedside. Introduced TCN program. Provided education regarding post acute levels of care. Education provided regarding case management policy for blanket SNF referrals. Discussed HTH/SCP plan benefits. Pt verbalizes understanding.     Pt reports no functional concerns with dc to home once medically cleared provided lightheadedness/near syncope issues are resolved. Per pt report and review, pt remains ambulatory with use of FWW. Pt reports she utilizes FWW or SPC at recent baseline as well. She reports no concerns with transportation to home at time of dc or for outpatient follow ups. She reports she has upcoming outpatient appointments as well with her kidney MD in April.    Note that pt has PT consult in place and TCN will monitor for recs*. Given aforementioned, no choice indicated at this time and no additional providers requested. TCN will continue to follow and collaborate with discharge planning team as additional post acute needs arise. Thank you.     Completed today:  PT consult in place; will monitor for recs*; note 6 clicks mobility at 22   Choice obtained: none indicated  SCP without [facility] PCP; see above, pt has upcoming outpatient appointments    *noted PT reached out to TCN to discuss post PT evaluation as PT currently recommending post acute placement 2' to abnormal function/findings during evaluation; TCN discussed PT recommendations with pt and will defer to CM blanket SNF referral if indicated per policy; pt in agreement and reports will select from accepting SNF facilities provided current dc plan to post acute placement remains appropriate; discussed above with NEELAM Smith and noted blanket referrals to SNF have been sent at time of writing  this addendum

## 2024-03-21 NOTE — PROGRESS NOTES
Hospital Medicine Daily Progress Note    Date of Service  3/21/2024    Chief Complaint  Anu Manuel is a 68 y.o. female admitted 3/20/2024 with Sent by MD (Pt sent by MD to start dialysis. Hx of stage 4 kidney disease. + 40 lb weight gain.) and Shortness of Breath        Hospital Course  No notes on file    Interval Problem Update  I reviewed the chart along with vitals, labs, imaging, test (both pending and resulted) and recommendations from specialists and interdisciplinary team.  69yo F w/ h/o CKD stage IV followed by nephroogy hypertension, diabetes, major depressive disorder, COPD who was sent by Nephrology clinic to [facility] ED on 3/20/2024 for initiation of dialysis.    IR for tunneled catheter placement TODAY  Ordered quantiferon may need outpatient dialysis chair  Patient otherwise stable.    I have discussed this patient's plan of care and discharge plan at IDT rounds today with Case Management, Nursing, Nursing leadership, and other members of the IDT team.    Consultants/Specialty  Nephrology    Code Status  DNAR/DNI    Disposition  The patient is not medically cleared for discharge to home or a post-acute facility.      I have placed the appropriate orders for post-discharge needs.    Review of Systems  Review of Systems   Constitutional:  Negative for chills and fever.   HENT:  Negative for congestion, hearing loss and nosebleeds.    Eyes:  Negative for pain and redness.   Respiratory:  Positive for shortness of breath. Negative for cough, hemoptysis and wheezing.    Cardiovascular:  Positive for leg swelling. Negative for chest pain and palpitations.   Gastrointestinal:  Negative for abdominal pain, blood in stool, constipation, diarrhea, nausea and vomiting.   Genitourinary:  Negative for dysuria, frequency and hematuria.   Musculoskeletal:  Negative for falls, joint pain and myalgias.   Skin:  Negative for rash.   Neurological:  Negative for dizziness, tremors, focal weakness, seizures,  loss of consciousness, weakness and headaches.   Psychiatric/Behavioral:  The patient is not nervous/anxious and does not have insomnia.    All other systems reviewed and are negative.       Physical Exam  Temp:  [36.5 °C (97.7 °F)-36.8 °C (98.2 °F)] 36.7 °C (98.1 °F)  Pulse:  [65-89] 76  Resp:  [14-19] 16  BP: (140-199)/(67-99) 166/81  SpO2:  [93 %-98 %] 93 %    Physical Exam  Vitals and nursing note reviewed.   Constitutional:       General: She is not in acute distress.  HENT:      Head: Normocephalic and atraumatic.      Right Ear: External ear normal.      Left Ear: External ear normal.      Nose: Nose normal.      Mouth/Throat:      Mouth: Mucous membranes are moist.   Eyes:      General: No scleral icterus.     Conjunctiva/sclera: Conjunctivae normal.   Cardiovascular:      Rate and Rhythm: Normal rate and regular rhythm.      Pulses: Normal pulses.      Heart sounds: No murmur heard.     No friction rub. No gallop.   Pulmonary:      Effort: Pulmonary effort is normal. No respiratory distress.      Breath sounds: No stridor. Rales (occ bibailar crackles) present. No wheezing or rhonchi.   Chest:      Chest wall: No tenderness.   Abdominal:      General: Abdomen is flat. Bowel sounds are normal. There is no distension.      Palpations: Abdomen is soft.      Tenderness: There is no abdominal tenderness. There is no guarding or rebound.   Musculoskeletal:         General: No swelling, tenderness or deformity. Normal range of motion.      Cervical back: Normal range of motion and neck supple. No rigidity.      Right lower leg: Edema present.      Left lower leg: Edema present.   Skin:     General: Skin is warm.      Coloration: Skin is not jaundiced.   Neurological:      General: No focal deficit present.      Mental Status: She is alert and oriented to person, place, and time. Mental status is at baseline.   Psychiatric:         Mood and Affect: Mood normal.         Behavior: Behavior normal.         Thought  Content: Thought content normal.         Judgment: Judgment normal.         Fluids    Intake/Output Summary (Last 24 hours) at 3/21/2024 1653  Last data filed at 3/21/2024 1512  Gross per 24 hour   Intake 400 ml   Output --   Net 400 ml       Laboratory  Recent Labs     03/20/24  1709   WBC 10.1   RBC 3.00*   HEMOGLOBIN 8.8*   HEMATOCRIT 27.1*   MCV 90.3   MCH 29.3   MCHC 32.5   RDW 61.1*   PLATELETCT 289   MPV 11.2     Recent Labs     03/20/24  1757   SODIUM 138   POTASSIUM 5.0   CHLORIDE 107   CO2 17*   GLUCOSE 119*   BUN 38*   CREATININE 2.40*   CALCIUM 8.5     Recent Labs     03/21/24  0905   INR 1.03               Imaging  IR-KERRY PATNIO PLACEMENT >5   Final Result      Successful image guided tunneled dialysis catheter placement.      Plan: The catheter is available for immediate use.         EC-ECHOCARDIOGRAM COMPLETE W/O CONT   Final Result      DX-CHEST-PORTABLE (1 VIEW)   Final Result      Small pleural effusions.           Assessment/Plan  * ESRD needing dialysis ([facility])  Assessment & Plan  Noted to have 40 lbs weight gain anasarca and peripheral edema in the setting of CKD stage IV  Nephrology consulted, recommended NPO midnight for tunneled dialysis catheter placement for initiation of dialysis    IR for cath today  Dialysis planned afterwards likely anuric thus not on Lasix      MDD (major depressive disorder)  Assessment & Plan  Resume home medications    Chronic obstructive pulmonary disease- (present on admission)  Assessment & Plan  Resume home medications  DuoNebs as needed    CKD (chronic kidney disease) stage 4, GFR 15-29 ml/min ([facility])- (present on admission)  Assessment & Plan  Hx of  Will begin dialysis as recommended by nephro while in house    Hypothyroidism- (present on admission)  Assessment & Plan  Continue synthroid    Hyperlipidemia  Assessment & Plan  Lipitor    DM (diabetes mellitus) ([facility])  Assessment & Plan  Sliding scale  A1c 8.7    ACP (advance care planning)  Assessment &  Plan  16 minutes spent discussing goals of care with patient.  She was explained that she has fluid overload likely due to her chronic kidney disease and that she will require dialysis for fluid removal.  She was asked because that is, to which he stated she would like to be DNR/DNI, okay for medical treatment.         VTE prophylaxis: SCD; heparin ppx after procedure    I have performed a physical exam and reviewed and updated ROS and Plan today (3/21/2024). In review of yesterday's note (3/20/2024), there are no changes except as documented above.

## 2024-03-21 NOTE — ED PROVIDER NOTES
"  ER Provider Note    Scribed for Sirisha Marquez M.d. by Giancarlo Beltrán. 3/20/2024  5:56 PM    Primary Care Provider: Jarrell Yates M.D.    CHIEF COMPLAINT  Chief Complaint   Patient presents with    Sent by MD     Pt sent by MD to start dialysis. Hx of stage 4 kidney disease. + 40 lb weight gain.    Shortness of Breath     LIMITATION TO HISTORY   Select: : None    HPI/ROS  OUTSIDE HISTORIAN(S):  None    EXTERNAL RECORDS REVIEWED  Outpatient Notes Patient was sent down to the ED from Dr. Najjar's office with uncontrolled hypertension and significant volume overload. He believes the patient needs to start dialysis and sent the patient to the ED for admission. Her last creatinine was 2.44 on 3/13/24.    Anu Manuel is a 68 y.o. female who presents to the ED in order to start dialysis. The patient was sent by Dr. Najjar from his office. She reports experiencing shortness of breath that is exacerbated with exertion over the last 1 month. She also adds that she has gained approximately 40 pounds from accumulation of fluid. Nurse at bedside adds that the patient was able to ambulate to the bathroom and back to the room without oxygen desaturation. Patient states she has done dialysis in the past after a kidney infection. Patient is still making urine but she notes it is less than normal.  No chest pain.  She has no known drug allergies. Patient has a history of Type I diabetes, Stage 4 Kidney disease, Hypertension, and Hyperlipidemia.    PAST MEDICAL HISTORY  Past Medical History:   Diagnosis Date    Adverse effect of anesthesia     in 2008 \"throat closes up\"\"anxiety\" ?laryngospasm, kept in ICU. Pt states no problems currently 2018.     Anemia     Anesthesia     in 2008 \"throat closes up\"\"anxiety\" ?laryngospasm, kept in ICU. Pt states no problems currently 2018.     Arthritis     right shoulder, hands    Bowel habit changes More frequent    Cataract 2022    Cigarette smoker quit 2013    Dental disorder     " "missing teeth; Partial plate on top    depression 08/30/2016    denies depression, states has anxiety and panic attacks    Diabetes mellitus type 1 (HCC) 1989    insulin    Dialysis patient (HCC) Short time due to a kidney injury from a infection    Encounter for long-term (current) use of insulin (HCC) 09/25/2013    GI bleed     Heart burn     High cholesterol     Hypertension     Hypothyroidism, postsurgical 1970    Indigestion     Infectious disease      had hepatitis C, tested neg.    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) 06/10/2015       SURGICAL HISTORY  Past Surgical History:   Procedure Laterality Date    DE ERCP,DIAGNOSTIC N/A 01/14/2022    Procedure: ERCP, DIAGNOSTIC - WITH SIGMOID COLON BIOPSY AND STENT REMOVAL;  Surgeon: Cr Haro M.D.;  Location: SURGERY [facility];  Service: Gastroenterology    THADDEUS BY LAPAROSCOPY N/A 10/28/2021    Procedure: CHOLECYSTECTOMY, LAPAROSCOPIC;  Surgeon: Tello Trammell M.D.;  Location: [facility];  Service: General    DE ERCP,DIAGNOSTIC N/A 10/26/2021    Procedure: ERCP (ENDOSCOPIC RETROGRADE CHOLANGIOPANCREATOGRAPHY);  Surgeon: Cr Haro M.D.;  Location: SURGERY SAME DAY [facility];  Service: Gastroenterology    DE UPPER GI ENDOSCOPY,BIOPSY N/A 10/26/2021    Procedure: GASTROSCOPY, WITH BIOPSY;  Surgeon: Cr Haro M.D.;  Location: SURGERY SAME DAY [facility];  Service: Gastroenterology    DE UPPER GI ENDOSCOPY,DIAGNOSIS N/A 10/26/2021    Procedure: GASTROSCOPY;  Surgeon: Cr Haro M.D.;  Location: SURGERY SAME DAY [facility];  Service: Gastroenterology    CATH PLACEMENT  01/25/2020    Procedure: INSERTION, CATHETER PERM;  Surgeon: Rola Mendoza M.D.;  Location: " SURGERY [facility];  Service: General    IRRIGATION & DEBRIDEMENT NEURO  01/19/2020    Procedure: IRRIGATION AND DEBRIDEMENT, WOUND THORACIC AND LUMBAR;  Surgeon: Ryan Roman M.D.;  Location: [facility];  Service: Neurosurgery    MI INS/RPLC SPI NPG/RCVR POCKET N/A 12/16/2019    Procedure: EXPLORATION AT THORACIC 8 - 9, REPLACEMENT OF  NEUROSTIMULATOR LEAD;  Surgeon: Ryan Roman M.D.;  Location: SURGERY [facility];  Service: Neurosurgery    SPINAL CORD STIMULATOR N/A 10/26/2018    Procedure: SPINAL CORD STIMULATOR;  Surgeon: Ryan Roman M.D.;  Location: SURGERY [facility];  Service: Neurosurgery    THORACIC LAMINECTOMY N/A 10/26/2018    Procedure: THORACIC LAMINECTOMY - FOR;  Surgeon: Ryan Roman M.D.;  Location: [facility];  Service: Neurosurgery    MI NJX AA&/STRD TFRML EPI LUMBAR/SACRAL 1 LEVEL Right 08/31/2016    Procedure: INJ-FORAMEN EPI LUM/SAC SNGL L4-5;  Surgeon: Sukhi Godfrey M.D.;  Location: [facility];  Service: Pain Management    LUMBAR LAMINECTOMY DISKECTOMY Right 05/10/2016    Procedure: LUMBAR L4-5 HEMILAMINECTOMY DISKECTOMY ;  Surgeon: Arnold Keyes M.D.;  Location: [facility];  Service:     CERVICAL FUSION POSTERIOR  01/16/2009    Performed by TARA CONTRERAS at [facility]    HARDWARE REMOVAL NEURO  01/16/2009    Performed by TARA CONTRERAS at [facility]    NECK EXPLORATION  01/16/2009    Performed by TARA CONTRERAS at [facility]    SHOULDER ARTHROSCOPY W/ ROTATOR CUFF REPAIR  10/09/2008    Performed by SHERLY CASTANEDA at [facility]    SHOULDER DECOMPRESSION ARTHROSCOPIC  06/17/2008    Performed by SHERLY CASTANEDA at [facility]    CLAVICLE DISTAL EXCISION  06/17/2008    Performed by SHERLY CASTANEDA at [facility]    CERVICAL DISK AND FUSION ANTERIOR  03/12/2008    HYSTERECTOMY, VAGINAL  2006    APPENDECTOMY  2004    THYROID  LOBECTOMY  1973    TONSILLECTOMY  1963    LUMPECTOMY  1976, 2005    Breast     LUMPECTOMY      OTHER ABDOMINAL SURGERY  2004    OTHER CARDIAC SURGERY  2020 stents inserted       FAMILY HISTORY  Family History   Problem Relation Age of Onset    Hypertension Mother     Cancer Father        SOCIAL HISTORY   reports that she has been smoking cigarettes. She has a 15 pack-year smoking history. She has never used smokeless tobacco. She reports that she does not currently use alcohol. She reports that she does not currently use drugs after having used the following drugs: Inhaled, Oral, and Marijuana.    CURRENT MEDICATIONS  Current Discharge Medication List        CONTINUE these medications which have NOT CHANGED    Details   albuterol 108 (90 Base) MCG/ACT Aero Soln inhalation aerosol Inhale 2 Puffs every 6 hours as needed for Shortness of Breath.      amLODIPine (NORVASC) 5 MG Tab Take 5 mg by mouth every day.      furosemide (LASIX) 40 MG Tab Take 40 mg by mouth every day.      insulin glargine (LANTUS SOLOSTAR) 100 UNIT/ML Solution Pen-injector injection Inject 5 Units under the skin every evening.      metoprolol SR (TOPROL XL) 50 MG TABLET SR 24 HR Take 200 mg by mouth every day. 4 tablets=200mg      levothyroxine (SYNTHROID) 125 MCG Tab Take 250 mcg by mouth every morning on an empty stomach. 2 tablets=250mg DAW1      Cyanocobalamin (VITAMIN B-12 PO) Take 1 Tablet by mouth every day.      umeclidinium-vilanterol (ANORO ELLIPTA) 62.5-25 MCG/ACT AEROSOL POWDER, BREATH ACTIVATED inhaler Inhale 1 Puff every day.  Qty: 60 Each, Refills: 0      senna-docusate (SENOKOT S) 8.6-50 MG Tab Take 1 Tablet by mouth every day.      oxycodone (OXY-IR) 15 MG immediate release tablet Take 15 mg by mouth every four hours as needed for Severe Pain.      venlafaxine (EFFEXOR-XR) 150 MG extended-release capsule Take 150 mg by mouth every day.      liothyronine (CYTOMEL) 5 MCG Tab Take 1 Tablet by mouth every day.  Qty: 90 Tablet,  "Refills: 3    Associated Diagnoses: Hypothyroidism, postsurgical      famotidine (PEPCID) 20 MG Tab TAKE 1 TABLET BY MOUTH TWICE A DAY  Qty: 180 Tablet, Refills: 3      atorvastatin (LIPITOR) 40 MG Tab Take 1 Tablet by mouth every evening.  Qty: 100 Tablet, Refills: 3    Associated Diagnoses: Dyslipidemia      fenofibrate (TRICOR) 48 MG Tab Take 48 mg by mouth every morning.      ondansetron (ZOFRAN ODT) 8 MG TABLET DISPERSIBLE Take 8 mg by mouth every 8 hours as needed for Nausea.      potassium chloride (MICRO-K) 10 MEQ capsule Take 1 Capsule by mouth every morning.      riFAMPin (RIFADINE) 300 MG Cap Take 300 Capsules by mouth 2 times a day.      ursodiol (ACTIGALL) 300 MG Cap Take 300-600 mg by mouth 2 times a day. 1 capsule qam & 2 caps qpm      VITAMIN D, ERGOCALCIFEROL, PO Take 1 Tablet by mouth 2 times a day.      traZODone (DESYREL) 150 MG Tab Take 150 mg by mouth at bedtime.      nicotine (NICODERM) 21 MG/24HR PATCH 24 HR Place 1 Patch on the skin every 24 hours for 30 days.  Qty: 28 Patch, Refills: 0      omeprazole (PRILOSEC) 40 MG delayed-release capsule Take 40 mg by mouth every day.             ALLERGIES  Tape    PHYSICAL EXAM  BP (!) 142/77   Pulse 75   Temp 36.1 °C (97 °F) (Temporal)   Resp 16   Ht 1.676 m (5' 6\")   Wt 74.8 kg (165 lb)   LMP 04/29/2005 (LMP Unknown)   SpO2 97%   BMI 26.63 kg/m²     Constitutional: Well developed, well nourished; No acute distress   HENT: Normocephalic, Atraumatic, Bilateral external ears normal, slightly dry mucous membranes  Eyes: PERRL, EOMI, Conjunctiva normal, No discharge.   Neck: Normal range of motion, supple, nontender.  Positive JVD  Lymphatic: No lymphadenopathy noted.   Cardiovascular: Normal heart rate, Normal rhythm, No murmurs, rubs or gallops   Thorax & Lungs: Decreased breath sounds throughout with crackles at the bases. Right more than left. No respiratory distress, No wheezing rales, or rhonchi; No chest tenderness. No crepitus or subQ " air  Abdomen: soft, decreased bowel sounds, no guarding no rebound, no masses, no pulsatile mass, no tenderness, no distention  Skin: Warm, Dry, diffuse bruising over her arms.   Back: No tenderness, No CVA tenderness.   Extremities: 2+ dp and pt pulses bilateral LEs;  Nontender; 2-3+ pitting edema bilateral lower extremities  Neurologic: Alert & oriented x 4, clear speech  Psychiatric: appropriate, normal affect    DIAGNOSTIC STUDIES & PROCEDURES    Labs:   Results for orders placed or performed during the hospital encounter of 03/20/24   CBC with Differential   Result Value Ref Range    WBC 10.1 4.8 - 10.8 K/uL    RBC 3.00 (L) 4.20 - 5.40 M/uL    Hemoglobin 8.8 (L) 12.0 - 16.0 g/dL    Hematocrit 27.1 (L) 37.0 - 47.0 %    MCV 90.3 81.4 - 97.8 fL    MCH 29.3 27.0 - 33.0 pg    MCHC 32.5 32.2 - 35.5 g/dL    RDW 61.1 (H) 35.9 - 50.0 fL    Platelet Count 289 164 - 446 K/uL    MPV 11.2 9.0 - 12.9 fL    Neutrophils-Polys 79.70 (H) 44.00 - 72.00 %    Lymphocytes 6.60 (L) 22.00 - 41.00 %    Monocytes 10.20 0.00 - 13.40 %    Eosinophils 1.90 0.00 - 6.90 %    Basophils 1.20 0.00 - 1.80 %    Immature Granulocytes 0.40 0.00 - 0.90 %    Nucleated RBC 0.00 0.00 - 0.20 /100 WBC    Neutrophils (Absolute) 8.01 (H) 1.82 - 7.42 K/uL    Lymphs (Absolute) 0.66 (L) 1.00 - 4.80 K/uL    Monos (Absolute) 1.03 (H) 0.00 - 0.85 K/uL    Eos (Absolute) 0.19 0.00 - 0.51 K/uL    Baso (Absolute) 0.12 0.00 - 0.12 K/uL    Immature Granulocytes (abs) 0.04 0.00 - 0.11 K/uL    NRBC (Absolute) 0.00 K/uL   Comp Metabolic Panel   Result Value Ref Range    Sodium 138 135 - 145 mmol/L    Potassium 5.0 3.6 - 5.5 mmol/L    Chloride 107 96 - 112 mmol/L    Co2 17 (L) 20 - 33 mmol/L    Anion Gap 14.0 7.0 - 16.0    Glucose 119 (H) 65 - 99 mg/dL    Bun 38 (H) 8 - 22 mg/dL    Creatinine 2.40 (H) 0.50 - 1.40 mg/dL    Calcium 8.5 8.5 - 10.5 mg/dL    Correct Calcium 9.1 8.5 - 10.5 mg/dL    AST(SGOT) 64 (H) 12 - 45 U/L    ALT(SGPT) 33 2 - 50 U/L    Alkaline Phosphatase  786 (H) 30 - 99 U/L    Total Bilirubin 0.5 0.1 - 1.5 mg/dL    Albumin 3.2 3.2 - 4.9 g/dL    Total Protein 8.4 (H) 6.0 - 8.2 g/dL    Globulin 5.2 (H) 1.9 - 3.5 g/dL    A-G Ratio 0.6 g/dL   proBrain Natriuretic Peptide, NT   Result Value Ref Range    NT-proBNP 4284 (H) 0 - 125 pg/mL   ESTIMATED GFR   Result Value Ref Range    GFR (CKD-EPI) 22 (A) >60 mL/min/1.73 m 2   EKG   Result Value Ref Range    Report       [facility] Emergency Dept.    Test Date:  2024  Pt Name:    KALPANA BUTTS               Department: ER  MRN:        [id number]                      Room:  Gender:     Female                       Technician: [id number]  :        [id number]                   Requested By:ER TRIAGE PROTOCOL  Order #:    [id number]                    Reading MD: Sirisha Marquez    Measurements  Intervals                                Axis  Rate:       77                           P:          155  NM:         151                          QRS:        144  QRSD:       95                           T:          -12  QT:         379  QTc:        429    Interpretive Statements  Sinus or ectopic atrial rhythm rate 77  Normal intervals  T waves flat throughout  No ST elevation or depression  Probable left atrial enlargement  Right axis deviation  Low voltage, precordial leads  Lead(s) II were not used for morphology analysis  Compared to ECG 2024 21:24:53    Electronically Signed On  18:42:04 PDT by Sirisha Marquez        All labs reviewed by me.    EK Lead EKG interpreted by me as shown above    Radiology:   The attending Emergency Physician has independently interpreted the diagnostic imaging associated with this visit and is awaiting the final reading from the radiologist, which will be displayed below.    Preliminary interpretation is a follows: ER MD is reviewed the patient's chest x-ray.  There does appear to be small bilateral pleural effusions with some increase interstitial  markings.    Radiologist interpretation:     DX-CHEST-PORTABLE (1 VIEW)   Final Result      Small pleural effusions.      EC-ECHOCARDIOGRAM COMPLETE W/O CONT    (Results Pending)        COURSE & MEDICAL DECISION MAKING    ED Observation Status? No; Patient does not meet criteria for ED Observation.     INITIAL ASSESSMENT AND PLAN  Care Narrative: Patient presents to the ER at the request of her nephrologist, Dr. Najjar, who sent her to the ER for admission for initiation of dialysis after she saw him in the office today complaining of a 40 pound weight gain in the last 1 month.  She describes lower extremity edema worsening over the last month as well as shortness of breath.  She was also hypertensive.  She describes dyspnea on exertion as well as some mild orthopnea.  No cough.  No fevers or chills.  No chest pain.  Patient's nephrologist felt that her fluid overload would likely respond to dialysis.  I spoke with Dr. Anderson, on-call nephrologist for Dr. Khan.  She heard about the patient prior to her arrival and says she will need a tunneled catheter to be done in the morning.  She is to be n.p.o. after midnight.  Patient's creatinine is 2.4.  Potassium is within normal range at 5.0.  No EKG changes.  Troponin is elevated 82 but this is actually improved from baseline.  Patient is making urine.  She will likely need echocardiogram as an inpatient to evaluate her cardiac function since she has been having lower extremity edema with some bilateral pleural effusions and has JVD on examination.  I also added a TSH and free T4.  TSH is quite elevated at 40 although she has been as high as 70 in the past.  She is awake and alert.  She is not obtunded.  At this time no concern for myxedema coma.  Plan is to get her hospitalized in preparation for probable dialysis catheter placement and dialysis tomorrow.  I spoke with Dr. Brown, hospitalist on-call, he will kindly evaluate the patient hospitalization.      5:56 PM -  Patient seen and evaluated at bedside. Ordered EKG, DX-chest, CBC w diff, Free Thyroxine, TSH, Troponin, eGFR, and proBrain natriuretic peptide to evaluate. She understands and agrees to the plan of care.     6:52 PM - Paged Nephrology.     7:00 PM I discussed the patient's case and the above findings with Dr. Anderson (Nephrology) who said she will need a tunneled catheter to be done in the morning. Paged Hospitalist.    7:02 PM - Case discussed with Hospitalist Dr. Brown regarding admission.     HTN/IDDM FOLLOW UP:  The patient has known hypertension and is being followed by their primary care doctor    ADDITIONAL PROBLEM LIST AND DISPOSITION  Problem #1: Fluid overload.  Sent down by her nephrologist for initiation of dialysis.               DISPOSITION AND DISCUSSIONS  I have discussed management of the patient with the following physicians and MILAN's: Nephrology Dr. Anderson and Hospitalist Dr. Brown    Discussion of management with other Q or appropriate source(s): None     Escalation of care considered, and ultimately not performed: diagnostic imaging.  Patient is not having any chest pain.  No cough.  No hemoptysis.  She is not tachycardic or hypoxic.  Low suspicion for pulmonary embolism.    Barriers to care at this time, including but not limited to:  None known .     Decision tools and prescription drugs considered including, but not limited to:  I considered placing patient on oxygen, but she has not been hypoxic. .    DISPOSITION:  Patient will be hospitalized by Dr. Brown in guarded condition.    FINAL IMPRESSION   1. Hypervolemia, unspecified hypervolemia type Acute   2. Pleural effusion, bilateral Acute   3. Lower extremity edema Acute     This dictation has been created using voice recognition software. The accuracy of the dictation is limited by the abilities of the software. I expect there may be some errors of grammar and possibly content. I made every attempt to manually correct the errors within my  dictation. However, errors related to voice recognition software may still exist and should be interpreted within the appropriate context.    IGiancarlo (Scribe), am scribing for, and in the presence of, Sirisha Marquez M.D..    Electronically signed by: Giancarlo Beltrán (Susan), 3/20/2024    Sirisha MCKEON M.D. personally performed the services described in this documentation, as scribed by Giancarlo Beltrán in my presence, and it is both accurate and complete.    The note accurately reflects work and decisions made by me.  Sirisha Marquez M.D.  3/20/2024  7:53 PM

## 2024-03-21 NOTE — H&P
[facility] Medicine History & Physical Note    Date of Service  3/20/2024    Primary Care Physician  Jarrell Yates M.D.    Consultants  Nephrology    Code Status  DNAR/DNI    Chief Complaint  Chief Complaint   Patient presents with    Sent by MD     Pt sent by MD to start dialysis. Hx of stage 4 kidney disease. + 40 lb weight gain.    Shortness of Breath       History of Presenting Illness  Anu Manuel is a 68 y.o. female who presented 3/20/2024 with weight gain.  Patient has history of CKD stage IV, hypertension, diabetes, major depressive disorder, COPD who presents to [facility] as referred by nephrology for initiation of dialysis.  For the last few months, patient has noted progressively worsening generalized weakness, weight gain of 40 pounds, overall body swelling, lower extremity swelling.  She is still able to urinate but not able to produce as much as before.  She went to see her nephrologist today, who recommended her to come to ER for dialysis initiation.    I discussed the plan of care with patient.    Review of Systems  Review of Systems   Constitutional:  Positive for malaise/fatigue.   HENT: Negative.     Eyes: Negative.    Respiratory: Negative.     Cardiovascular:  Positive for leg swelling.   Gastrointestinal: Negative.    Genitourinary: Negative.    Musculoskeletal: Negative.    Skin: Negative.    Neurological:  Positive for weakness.   Endo/Heme/Allergies: Negative.    Psychiatric/Behavioral: Negative.         Past Medical History   has a past medical history of Adverse effect of anesthesia, Anemia, Anesthesia, Arthritis, Bowel habit changes (More frequent), Cataract (2022), Cigarette smoker (quit 2013), Dental disorder, depression (08/30/2016), Diabetes mellitus type 1 (HCC) (1989), Dialysis patient ([facility]) (Short time due to a kidney injury from a infection), Encounter for long-term (current) use of insulin ([facility]) (09/25/2013), GI bleed, Heart burn, High cholesterol, Hypertension,  Hypothyroidism, postsurgical (1970), Indigestion, Infectious disease, Jaundice (2021 Liver disease), Joint replacement, Liver disease, Liver failure (HCC), Pain, Polyneuropathy in diabetes(357.2) (06/10/2015), Status post appendectomy, and 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    Surgical History   has a past surgical history that includes hysterectomy, vaginal (2006); thyroid lobectomy (1973); lumpectomy (1976, 2005); cervical disk and fusion anterior (03/12/2008); tonsillectomy (1963); cervical fusion posterior (01/16/2009); hardware removal neuro (01/16/2009); neck exploration (01/16/2009); lumpectomy; lumbar laminectomy diskectomy (Right, 05/10/2016); shoulder decompression arthroscopic (06/17/2008); clavicle distal excision (06/17/2008); shoulder arthroscopy w/ rotator cuff repair (10/09/2008); pr njx aa&/strd tfrml epi lumbar/sacral 1 level (Right, 08/31/2016); spinal cord stimulator (N/A, 10/26/2018); thoracic laminectomy (N/A, 10/26/2018); appendectomy (2004); pr ins/rplc spi npg/rcvr pocket (N/A, 12/16/2019); irrigation & debridement neuro (01/19/2020); cath placement (01/25/2020); pr ercp,diagnostic (N/A, 10/26/2021); pr upper gi endoscopy,biopsy (N/A, 10/26/2021); pr upper gi endoscopy,diagnosis (N/A, 10/26/2021); peter by laparoscopy (N/A, 10/28/2021); pr ercp,diagnostic (N/A, 01/14/2022); other cardiac surgery (2020 stents inserted); and other abdominal surgery (2004).     Family History  family history includes Cancer in her father; Hypertension in her mother.   Family history reviewed with patient. There is no family history that is pertinent to the chief complaint.     Social History   reports that she has been smoking cigarettes. She has a 15 pack-year smoking history. She has never used smokeless tobacco. She reports that she does not currently use alcohol. She reports that she does not currently use drugs after having used the  following drugs: Inhaled, Oral, and Marijuana.    Allergies  Allergies   Allergen Reactions    Tape Unspecified and Rash     Blisters, paper tape is ok  Other reaction(s): Rash       Medications  Prior to Admission Medications   Prescriptions Last Dose Informant Patient Reported? Taking?   SYNTHROID 125 MCG Tab  Patient No No   Sig: Take 2 Tablets by mouth every day. Name brand medically necessare   Vitamin D, Ergocalciferol, 50 MCG (2000 UT) Cap  Patient Yes No   Sig: Take 2,000 Units by mouth 2 times a day.   albuterol 108 (90 Base) MCG/ACT Aero Soln inhalation aerosol 3/20/2024 at 1300  Yes Yes   Sig: Inhale 2 Puffs every 6 hours as needed for Shortness of Breath.   amLODIPine (NORVASC) 5 MG Tab 3/20/2024 at am  Yes Yes   Sig: Take 5 mg by mouth every day.   atorvastatin (LIPITOR) 40 MG Tab 3/19/2024 at pm Patient No Yes   Sig: Take 1 Tablet by mouth every evening.   famotidine (PEPCID) 20 MG Tab 3/20/2024 at am Patient No Yes   Sig: TAKE 1 TABLET BY MOUTH TWICE A DAY   Patient taking differently: Take 20 mg by mouth 2 times a day.   fenofibrate (TRICOR) 48 MG Tab 3/20/2024 at am Patient Yes Yes   Sig: Take 48 mg by mouth every morning.   furosemide (LASIX) 40 MG Tab 3/20/2024 at am  Yes Yes   Sig: Take 40 mg by mouth every day.   liothyronine (CYTOMEL) 5 MCG Tab  Patient No No   Sig: Take 1 Tablet by mouth every day.   nicotine (NICODERM) 21 MG/24HR PATCH 24 HR   No No   Sig: Place 1 Patch on the skin every 24 hours for 30 days.   omeprazole (PRILOSEC) 40 MG delayed-release capsule  Patient Yes No   Sig: Take 40 mg by mouth every day.   ondansetron (ZOFRAN ODT) 8 MG TABLET DISPERSIBLE  Patient Yes No   Sig: Take 8 mg by mouth every 8 hours as needed.   oxycodone (OXY-IR) 15 MG immediate release tablet  Patient Yes No   Sig: Take 15 mg by mouth every four hours as needed for Severe Pain.   potassium chloride (MICRO-K) 10 MEQ capsule  Patient Yes No   Sig: Take 1 Capsule by mouth every morning.   riFAMPin  (RIFADINE) 300 MG Cap  Patient Yes No   Sig: Take 300 Capsules by mouth 2 times a day.   senna-docusate (SENOKOT S) 8.6-50 MG Tab  Patient Yes No   Sig: Take 1 Tablet by mouth every day.   traZODone (DESYREL) 150 MG Tab  Patient Yes No   Sig: Take 150 mg by mouth at bedtime.   umeclidinium-vilanterol (ANORO ELLIPTA) 62.5-25 MCG/ACT AEROSOL POWDER, BREATH ACTIVATED inhaler   No No   Sig: Inhale 1 Puff every day.   ursodiol (ACTIGALL) 300 MG Cap  Patient Yes No   Sig: Take 300 mg by mouth 3 times a day.   venlafaxine (EFFEXOR-XR) 150 MG extended-release capsule  Patient Yes No   Sig: Take 150 mg by mouth every day.   vitamin B-12 CR (CYANOCOBALAMIN) 1000 MCG Tab CR tablet  Patient Yes No   Sig: Take 1,000 mcg by mouth every day.      Facility-Administered Medications: None       Physical Exam  Temp:  [36.1 °C (97 °F)-36.4 °C (97.6 °F)] 36.1 °C (97 °F)  Pulse:  [75-77] 77  Resp:  [14-16] 14  BP: (138-179)/(77-99) 179/99  SpO2:  [94 %-98 %] 94 %  Blood Pressure : (!) 179/99   Temperature: 36.1 °C (97 °F)   Pulse: 77   Respiration: 14   Pulse Oximetry: 94 %       Physical Exam  Constitutional:       Appearance: Normal appearance.      Comments: Overall body swelling noted   HENT:      Head: Normocephalic.      Nose: Nose normal.      Mouth/Throat:      Mouth: Mucous membranes are moist.   Eyes:      Pupils: Pupils are equal, round, and reactive to light.   Cardiovascular:      Rate and Rhythm: Normal rate and regular rhythm.      Pulses: Normal pulses.   Pulmonary:      Effort: Pulmonary effort is normal.      Breath sounds: Normal breath sounds.   Abdominal:      General: Abdomen is flat. Bowel sounds are normal.      Palpations: Abdomen is soft.   Musculoskeletal:         General: Swelling present.      Cervical back: Neck supple.   Skin:     General: Skin is warm.   Neurological:      General: No focal deficit present.      Mental Status: She is alert and oriented to person, place, and time. Mental status is at  "baseline.   Psychiatric:         Mood and Affect: Mood normal.         Behavior: Behavior normal.         Thought Content: Thought content normal.         Judgment: Judgment normal.         Laboratory:  Recent Labs     03/20/24  1709   WBC 10.1   RBC 3.00*   HEMOGLOBIN 8.8*   HEMATOCRIT 27.1*   MCV 90.3   MCH 29.3   MCHC 32.5   RDW 61.1*   PLATELETCT 289   MPV 11.2     Recent Labs     03/20/24  1757   SODIUM 138   POTASSIUM 5.0   CHLORIDE 107   CO2 17*   GLUCOSE 119*   BUN 38*   CREATININE 2.40*   CALCIUM 8.5     Recent Labs     03/20/24  1757   ALTSGPT 33   ASTSGOT 64*   ALKPHOSPHAT 786*   TBILIRUBIN 0.5   GLUCOSE 119*         Recent Labs     03/20/24  1757   NTPROBNP 4284*         No results for input(s): \"TROPONINT\" in the last 72 hours.    Imaging:  DX-CHEST-PORTABLE (1 VIEW)   Final Result      Small pleural effusions.      EC-ECHOCARDIOGRAM COMPLETE W/O CONT    (Results Pending)       X-Ray:  I have personally reviewed the images and compared with prior images.    Assessment/Plan:  Justification for Admission Status  I anticipate this patient will require at least two midnights for appropriate medical management, necessitating inpatient admission because patient has fluid overload    Patient will need a Telemetry bed on MEDICAL service .  The need is secondary to fluid overload.    * Fluid overload  Assessment & Plan  Noted to have 40 lbs weight gain anasarca and peripheral edema in the setting of CKD stage IV  Nephrology consulted, recommended NPO midnight for tunneled dialysis catheter placement for initiation of dialysis      MDD (major depressive disorder)  Assessment & Plan  Resume home medications    Chronic obstructive pulmonary disease- (present on admission)  Assessment & Plan  Resume home medications  DuoNebs as needed    CKD (chronic kidney disease) stage 4, GFR 15-29 ml/min ([facility])- (present on admission)  Assessment & Plan  Hx of  Will begin dialysis as recommended by nephro while in " house    Hypothyroidism- (present on admission)  Assessment & Plan  Continue synthroid    Hyperlipidemia  Assessment & Plan  Lipitor    DM (diabetes mellitus) ([facility])  Assessment & Plan  Sliding scale    ACP (advance care planning)  Assessment & Plan  16 minutes spent discussing goals of care with patient.  She was explained that she has fluid overload likely due to her chronic kidney disease and that she will require dialysis for fluid removal.  She was asked because that is, to which he stated she would like to be DNR/DNI, okay for medical treatment.        VTE prophylaxis: pharmacologic prophylaxis contraindicated due to procedure in a.m.

## 2024-03-21 NOTE — ASSESSMENT & PLAN NOTE
Lipitor   Melolabial Interpolation Flap Text: A decision was made to reconstruct the defect utilizing an interpolation axial flap and a staged reconstruction.  A telfa template was made of the defect.  This telfa template was then used to outline the melolabial interpolation flap.  The donor area for the pedicle flap was then injected with anesthesia.  The flap was excised through the skin and subcutaneous tissue down to the layer of the underlying musculature.  The pedicle flap was carefully excised within this deep plane to maintain its blood supply.  The edges of the donor site were undermined.   The donor site was closed in a primary fashion.  The pedicle was then rotated into position and sutured.  Once the tube was sutured into place, adequate blood supply was confirmed with blanching and refill.  The pedicle was then wrapped with xeroform gauze and dressed appropriately with a telfa and gauze bandage to ensure continued blood supply and protect the attached pedicle.

## 2024-03-22 LAB
ALBUMIN SERPL BCP-MCNC: 2.9 G/DL (ref 3.2–4.9)
BUN SERPL-MCNC: 28 MG/DL (ref 8–22)
CALCIUM ALBUM COR SERPL-MCNC: 8.8 MG/DL (ref 8.5–10.5)
CALCIUM SERPL-MCNC: 7.9 MG/DL (ref 8.5–10.5)
CHLORIDE SERPL-SCNC: 102 MMOL/L (ref 96–112)
CO2 SERPL-SCNC: 19 MMOL/L (ref 20–33)
CREAT SERPL-MCNC: 1.92 MG/DL (ref 0.5–1.4)
ERYTHROCYTE [DISTWIDTH] IN BLOOD BY AUTOMATED COUNT: 58.4 FL (ref 35.9–50)
GFR SERPLBLD CREATININE-BSD FMLA CKD-EPI: 28 ML/MIN/1.73 M 2
GLUCOSE BLD STRIP.AUTO-MCNC: 207 MG/DL (ref 65–99)
GLUCOSE BLD STRIP.AUTO-MCNC: 276 MG/DL (ref 65–99)
GLUCOSE BLD STRIP.AUTO-MCNC: 278 MG/DL (ref 65–99)
GLUCOSE SERPL-MCNC: 322 MG/DL (ref 65–99)
HCT VFR BLD AUTO: 26.3 % (ref 37–47)
HGB BLD-MCNC: 8.4 G/DL (ref 12–16)
MCH RBC QN AUTO: 28.5 PG (ref 27–33)
MCHC RBC AUTO-ENTMCNC: 31.9 G/DL (ref 32.2–35.5)
MCV RBC AUTO: 89.2 FL (ref 81.4–97.8)
PHOSPHATE SERPL-MCNC: 3.6 MG/DL (ref 2.5–4.5)
PLATELET # BLD AUTO: 248 K/UL (ref 164–446)
PMV BLD AUTO: 11.1 FL (ref 9–12.9)
POTASSIUM SERPL-SCNC: 4.3 MMOL/L (ref 3.6–5.5)
RBC # BLD AUTO: 2.95 M/UL (ref 4.2–5.4)
SODIUM SERPL-SCNC: 135 MMOL/L (ref 135–145)
WBC # BLD AUTO: 8.1 K/UL (ref 4.8–10.8)

## 2024-03-22 PROCEDURE — 700102 HCHG RX REV CODE 250 W/ 637 OVERRIDE(OP): Performed by: STUDENT IN AN ORGANIZED HEALTH CARE EDUCATION/TRAINING PROGRAM

## 2024-03-22 PROCEDURE — 99407 BEHAV CHNG SMOKING > 10 MIN: CPT

## 2024-03-22 PROCEDURE — 99232 SBSQ HOSP IP/OBS MODERATE 35: CPT | Performed by: INTERNAL MEDICINE

## 2024-03-22 PROCEDURE — 700111 HCHG RX REV CODE 636 W/ 250 OVERRIDE (IP)

## 2024-03-22 PROCEDURE — 94664 DEMO&/EVAL PT USE INHALER: CPT

## 2024-03-22 PROCEDURE — 36415 COLL VENOUS BLD VENIPUNCTURE: CPT

## 2024-03-22 PROCEDURE — 5A1D70Z PERFORMANCE OF URINARY FILTRATION, INTERMITTENT, LESS THAN 6 HOURS PER DAY: ICD-10-PCS | Performed by: INTERNAL MEDICINE

## 2024-03-22 PROCEDURE — 82962 GLUCOSE BLOOD TEST: CPT

## 2024-03-22 PROCEDURE — A9270 NON-COVERED ITEM OR SERVICE: HCPCS | Performed by: INTERNAL MEDICINE

## 2024-03-22 PROCEDURE — 80069 RENAL FUNCTION PANEL: CPT

## 2024-03-22 PROCEDURE — A9270 NON-COVERED ITEM OR SERVICE: HCPCS | Performed by: STUDENT IN AN ORGANIZED HEALTH CARE EDUCATION/TRAINING PROGRAM

## 2024-03-22 PROCEDURE — 90935 HEMODIALYSIS ONE EVALUATION: CPT | Performed by: INTERNAL MEDICINE

## 2024-03-22 PROCEDURE — 90935 HEMODIALYSIS ONE EVALUATION: CPT

## 2024-03-22 PROCEDURE — 700102 HCHG RX REV CODE 250 W/ 637 OVERRIDE(OP)

## 2024-03-22 PROCEDURE — 700102 HCHG RX REV CODE 250 W/ 637 OVERRIDE(OP): Performed by: INTERNAL MEDICINE

## 2024-03-22 PROCEDURE — 770006 HCHG ROOM/CARE - MED/SURG/GYN SEMI*

## 2024-03-22 PROCEDURE — 86480 TB TEST CELL IMMUN MEASURE: CPT

## 2024-03-22 PROCEDURE — 85027 COMPLETE CBC AUTOMATED: CPT

## 2024-03-22 PROCEDURE — A9270 NON-COVERED ITEM OR SERVICE: HCPCS

## 2024-03-22 RX ORDER — OMEPRAZOLE 20 MG/1
20 CAPSULE, DELAYED RELEASE ORAL 2 TIMES DAILY
Status: DISCONTINUED | OUTPATIENT
Start: 2024-03-22 | End: 2024-03-24

## 2024-03-22 RX ORDER — HEPARIN SODIUM 5000 [USP'U]/ML
5000 INJECTION, SOLUTION INTRAVENOUS; SUBCUTANEOUS EVERY 8 HOURS
Status: DISCONTINUED | OUTPATIENT
Start: 2024-03-23 | End: 2024-03-26 | Stop reason: HOSPADM

## 2024-03-22 RX ORDER — LORAZEPAM 0.5 MG/1
0.5 TABLET ORAL EVERY 6 HOURS PRN
Status: DISCONTINUED | OUTPATIENT
Start: 2024-03-22 | End: 2024-03-24

## 2024-03-22 RX ORDER — HEPARIN SODIUM 1000 [USP'U]/ML
INJECTION, SOLUTION INTRAVENOUS; SUBCUTANEOUS
Status: COMPLETED
Start: 2024-03-22 | End: 2024-03-22

## 2024-03-22 RX ADMIN — LORAZEPAM 0.5 MG: 0.5 TABLET ORAL at 21:44

## 2024-03-22 RX ADMIN — LIOTHYRONINE SODIUM 5 MCG: 5 TABLET ORAL at 04:06

## 2024-03-22 RX ADMIN — LORAZEPAM 0.5 MG: 0.5 TABLET ORAL at 07:38

## 2024-03-22 RX ADMIN — OXYCODONE HYDROCHLORIDE 15 MG: 15 TABLET ORAL at 04:05

## 2024-03-22 RX ADMIN — LABETALOL HYDROCHLORIDE 10 MG: 5 INJECTION INTRAVENOUS at 06:37

## 2024-03-22 RX ADMIN — ALBUTEROL SULFATE 2 PUFF: 90 AEROSOL, METERED RESPIRATORY (INHALATION) at 02:01

## 2024-03-22 RX ADMIN — HEPARIN SODIUM 3600 UNITS: 1000 INJECTION, SOLUTION INTRAVENOUS; SUBCUTANEOUS at 13:33

## 2024-03-22 RX ADMIN — NICOTINE 14 MG: 14 PATCH TRANSDERMAL at 04:06

## 2024-03-22 RX ADMIN — VENLAFAXINE HYDROCHLORIDE 150 MG: 75 CAPSULE, EXTENDED RELEASE ORAL at 04:06

## 2024-03-22 RX ADMIN — METOPROLOL SUCCINATE 200 MG: 100 TABLET, EXTENDED RELEASE ORAL at 04:17

## 2024-03-22 RX ADMIN — ATORVASTATIN CALCIUM 40 MG: 40 TABLET, FILM COATED ORAL at 21:44

## 2024-03-22 RX ADMIN — AMLODIPINE BESYLATE 5 MG: 5 TABLET ORAL at 04:05

## 2024-03-22 RX ADMIN — OMEPRAZOLE 20 MG: 20 CAPSULE, DELAYED RELEASE ORAL at 18:58

## 2024-03-22 RX ADMIN — INSULIN LISPRO 3 UNITS: 100 INJECTION, SOLUTION INTRAVENOUS; SUBCUTANEOUS at 05:02

## 2024-03-22 RX ADMIN — TRAZODONE HYDROCHLORIDE 100 MG: 100 TABLET ORAL at 21:43

## 2024-03-22 RX ADMIN — INSULIN LISPRO 5 UNITS: 100 INJECTION, SOLUTION INTRAVENOUS; SUBCUTANEOUS at 18:58

## 2024-03-22 RX ADMIN — LEVOTHYROXINE SODIUM 250 MCG: 0.12 TABLET ORAL at 04:06

## 2024-03-22 RX ADMIN — INSULIN LISPRO 2 UNITS: 100 INJECTION, SOLUTION INTRAVENOUS; SUBCUTANEOUS at 21:58

## 2024-03-22 RX ADMIN — LORAZEPAM 0.5 MG: 0.5 TABLET ORAL at 15:23

## 2024-03-22 RX ADMIN — INSULIN LISPRO 5 UNITS: 100 INJECTION, SOLUTION INTRAVENOUS; SUBCUTANEOUS at 07:39

## 2024-03-22 RX ADMIN — OXYCODONE HYDROCHLORIDE 15 MG: 15 TABLET ORAL at 18:05

## 2024-03-22 RX ADMIN — ALBUTEROL SULFATE 2 PUFF: 90 AEROSOL, METERED RESPIRATORY (INHALATION) at 18:07

## 2024-03-22 RX ADMIN — INSULIN LISPRO 3 UNITS: 100 INJECTION, SOLUTION INTRAVENOUS; SUBCUTANEOUS at 18:59

## 2024-03-22 RX ADMIN — UMECLIDINIUM BROMIDE AND VILANTEROL TRIFENATATE 1 PUFF: 62.5; 25 POWDER RESPIRATORY (INHALATION) at 04:06

## 2024-03-22 ASSESSMENT — PAIN DESCRIPTION - PAIN TYPE
TYPE: ACUTE PAIN
TYPE: ACUTE PAIN

## 2024-03-22 ASSESSMENT — ENCOUNTER SYMPTOMS
FALLS: 0
SEIZURES: 0
WEAKNESS: 0
INSOMNIA: 0
COUGH: 0
EYE REDNESS: 0
FEVER: 0
CONSTIPATION: 0
SHORTNESS OF BREATH: 1
BLOOD IN STOOL: 0
WHEEZING: 0
DIZZINESS: 0
MYALGIAS: 0
EYE PAIN: 0
HEADACHES: 0
HEMOPTYSIS: 0
NERVOUS/ANXIOUS: 0
DIARRHEA: 0
FOCAL WEAKNESS: 0
NAUSEA: 0
PALPITATIONS: 0
LOSS OF CONSCIOUSNESS: 0
TREMORS: 0
ABDOMINAL PAIN: 0
CHILLS: 0
VOMITING: 0

## 2024-03-22 ASSESSMENT — LIFESTYLE VARIABLES: PACK_YEARS: >60

## 2024-03-22 ASSESSMENT — FIBROSIS 4 INDEX: FIB4 SCORE: 2.96

## 2024-03-22 NOTE — DISCHARGE PLANNING
1107 LMSW reached out to dialysis coordinator to put pt on radar for possible outpatient dialysis chair.

## 2024-03-22 NOTE — PROGRESS NOTES
Hospital Medicine Daily Progress Note    Date of Service  3/22/2024    Chief Complaint  Anu Manuel is a 68 y.o. female admitted 3/20/2024 with Sent by MD (Pt sent by MD to start dialysis. Hx of stage 4 kidney disease. + 40 lb weight gain.) and Shortness of Breath        Hospital Course  No notes on file    Interval Problem Update  I reviewed the chart along with vitals, labs, imaging, test (both pending and resulted) and recommendations from specialists and interdisciplinary team.  69yo F w/ h/o CKD stage IV followed by nephroogy hypertension, diabetes, major depressive disorder, COPD who was sent by Nephrology clinic to [facility] on 3/20/2024 for initiation of dialysis.    IR for tunneled catheter placed 3/22 by IR  Ordered quantiferon may need outpatient dialysis chair  HE is tolerating dialysis.    I have discussed this patient's plan of care and discharge plan at IDT rounds today with Case Management, Nursing, Nursing leadership, and other members of the IDT team.    Consultants/Specialty  Nephrology    Code Status  DNAR/DNI    Disposition  The patient is not medically cleared for discharge to home or a post-acute facility.      I have placed the appropriate orders for post-discharge needs.    Review of Systems  Review of Systems   Constitutional:  Negative for chills and fever.   HENT:  Negative for congestion, hearing loss and nosebleeds.    Eyes:  Negative for pain and redness.   Respiratory:  Positive for shortness of breath. Negative for cough, hemoptysis and wheezing.    Cardiovascular:  Positive for leg swelling. Negative for chest pain and palpitations.   Gastrointestinal:  Negative for abdominal pain, blood in stool, constipation, diarrhea, nausea and vomiting.   Genitourinary:  Negative for dysuria, frequency and hematuria.   Musculoskeletal:  Negative for falls, joint pain and myalgias.   Skin:  Negative for rash.   Neurological:  Negative for dizziness, tremors, focal weakness, seizures,  loss of consciousness, weakness and headaches.   Psychiatric/Behavioral:  The patient is not nervous/anxious and does not have insomnia.    All other systems reviewed and are negative.       Physical Exam  Temp:  [36.5 °C (97.7 °F)-37.2 °C (99 °F)] 36.6 °C (97.9 °F)  Pulse:  [78-91] 78  Resp:  [16-20] 20  BP: (145-177)/(80-96) 158/82  SpO2:  [94 %-98 %] 96 %    Physical Exam  Vitals and nursing note reviewed.   Constitutional:       General: She is not in acute distress.  HENT:      Head: Normocephalic and atraumatic.      Right Ear: External ear normal.      Left Ear: External ear normal.      Nose: Nose normal.      Mouth/Throat:      Mouth: Mucous membranes are moist.   Eyes:      General: No scleral icterus.     Conjunctiva/sclera: Conjunctivae normal.   Neck:      Comments: TUnneled dialysis catheter  Cardiovascular:      Rate and Rhythm: Normal rate and regular rhythm.      Pulses: Normal pulses.      Heart sounds: No murmur heard.     No friction rub. No gallop.   Pulmonary:      Effort: Pulmonary effort is normal. No respiratory distress.      Breath sounds: No stridor. Rales (occ bibailar crackles) present. No wheezing or rhonchi.   Chest:      Chest wall: No tenderness.   Abdominal:      General: Abdomen is flat. Bowel sounds are normal. There is no distension.      Palpations: Abdomen is soft.      Tenderness: There is no abdominal tenderness. There is no guarding or rebound.   Musculoskeletal:         General: No swelling, tenderness or deformity. Normal range of motion.      Cervical back: Normal range of motion and neck supple. No rigidity.      Right lower leg: Edema present.      Left lower leg: Edema present.   Skin:     General: Skin is warm.      Coloration: Skin is not jaundiced.   Neurological:      General: No focal deficit present.      Mental Status: She is alert and oriented to person, place, and time. Mental status is at baseline.   Psychiatric:         Mood and Affect: Mood normal.          Behavior: Behavior normal.         Thought Content: Thought content normal.         Judgment: Judgment normal.         Fluids    Intake/Output Summary (Last 24 hours) at 3/22/2024 1643  Last data filed at 3/22/2024 0419  Gross per 24 hour   Intake 1500 ml   Output 1900 ml   Net -400 ml       Laboratory  Recent Labs     03/20/24  1709 03/22/24  0116   WBC 10.1 8.1   RBC 3.00* 2.95*   HEMOGLOBIN 8.8* 8.4*   HEMATOCRIT 27.1* 26.3*   MCV 90.3 89.2   MCH 29.3 28.5   MCHC 32.5 31.9*   RDW 61.1* 58.4*   PLATELETCT 289 248   MPV 11.2 11.1     Recent Labs     03/20/24  1757 03/22/24  0116   SODIUM 138 135   POTASSIUM 5.0 4.3   CHLORIDE 107 102   CO2 17* 19*   GLUCOSE 119* 322*   BUN 38* 28*   CREATININE 2.40* 1.92*   CALCIUM 8.5 7.9*     Recent Labs     03/21/24  0905   INR 1.03               Imaging  IR-KERRY PATINO PLACEMENT >5   Final Result      Successful image guided tunneled dialysis catheter placement.      Plan: The catheter is available for immediate use.         EC-ECHOCARDIOGRAM COMPLETE W/O CONT   Final Result      DX-CHEST-PORTABLE (1 VIEW)   Final Result      Small pleural effusions.           Assessment/Plan  * ESRD needing dialysis (HCC)  Assessment & Plan  Noted to have 40 lbs weight gain anasarca and peripheral edema in the setting of CKD stage IV  Nephrology consulted, recommended NPO midnight for tunneled dialysis catheter placement for initiation of dialysis    Tolerating dialysis  K 4.3 bicarb 19, Cr- 1.92      MDD (major depressive disorder)  Assessment & Plan  Resume home medications    Chronic obstructive pulmonary disease- (present on admission)  Assessment & Plan  Resume home medications  DuoNebs as needed    CKD (chronic kidney disease) stage 4, GFR 15-29 ml/min ([facility])- (present on admission)  Assessment & Plan  Hx of  Will begin dialysis as recommended by nephro while in house    Hypothyroidism- (present on admission)  Assessment & Plan  Continue synthroid    Hyperlipidemia  Assessment &  Plan  Lipitor    DM (diabetes mellitus) ([facility])  Assessment & Plan  Sliding scale  A1c 8.7    ACP (advance care planning)  Assessment & Plan  16 minutes spent discussing goals of care with patient.  She was explained that she has fluid overload likely due to her chronic kidney disease and that she will require dialysis for fluid removal.  She was asked because that is, to which he stated she would like to be DNR/DNI, okay for medical treatment.         VTE prophylaxis: heparin ppx    I have performed a physical exam and reviewed and updated ROS and Plan today (3/22/2024). In review of yesterday's note (3/21/2024), there are no changes except as documented above.

## 2024-03-22 NOTE — CONSULTS
DATE OF SERVICE:  03/21/2024     NEPHROLOGY CONSULTATION     REQUESTING PHYSICIAN:  Sirisha Marquez M.D.     REASON FOR CONSULTATION:  To evaluate patient with end-stage renal disease,   admitted to start dialysis.     HISTORY OF PRESENT ILLNESS:  The patient is a 68-year-old female with   long-term history of advanced chronic kidney disease.  A couple of years ago,   patient suffered from a renal trauma and was temporarily dialyzed.  She   recovered at that point.  Currently, patient is getting more fatigued, poor   appetite with some nausea and vomiting.  Also, worsening edema, which are hard   to control with diuretics.  Patient was seen by an associate of mine, Dr. Najjar on 03/20/2024, and sent to the emergency room to initiate hemodialysis.    Currently, the patient is undergoing dialysis, tolerates well.     REVIEW OF SYSTEMS:  GENERAL:  Positive for fatigue, malaise.  No fever, chills.  Poor appetite.  HEENT:  No nosebleeds, no sore throat, no sinus pain.  EYES:  No double or blurry vision, no eye pain.  NECK:  No pain, no stiffness.  RESPIRATORY:  Mild shortness of breath, no cough, no hemoptysis.  CARDIOVASCULAR:  Positive for edema.  No chest pain or palpitation.  GASTROINTESTINAL:  Positive for nausea and vomiting.  No diarrhea, no   abdominal pain.     All other systems reviewed, all negative.     PAST MEDICAL HISTORY:  Chronic kidney disease, diabetes mellitus type 1,   depression, arthritis, anemia, hypertension, liver disease, hyperlipidemia, GI   bleeding, polyneuropathy.     PAST SURGICAL HISTORY:  Appendectomy, ERCP, cholecystectomy, diskectomy,   thyroid lobectomy, hysterectomy, lumpectomy.     FAMILY HISTORY:  Positive for hypertension and cancer.  No history of kidney   disease.     SOCIAL HISTORY:  Positive for tobacco,  No alcohol or drug use.     ALLERGIES:  ALLERGIC TO TAPE.  No known drug allergies.     OUTPATIENT MEDICATIONS:  Reviewed.     PHYSICAL EXAMINATION:  VITAL SIGNS:  Blood  pressure 166/81, heart rate 76, temperature 36.5 Celsius.  GENERAL APPEARANCE:  Well-developed, well-nourished female.  No acute   distress.  HEENT:  Normocephalic, atraumatic.  Pupils equal, round, reactive to light.    Extraocular movements intact.  Nares patent.  Oropharynx clear, moist mucosa,   no erythema or exudate.  NECK:  Supple.  No lymphadenopathy, no thyromegaly appreciated.  Tunneled   dialysis catheter in place.  No bleeding.  LUNGS:  Clear to auscultation bilaterally and anteriorly noticed bilateral   bibasilar crackles.  No wheezes.  HEART:  Regular rhythm, no rub or gallop.  ABDOMEN:  Soft, nontender, nondistended.  Bowel sounds present.  No palpable   mass.  EXTREMITIES:  Bilateral pedal edema.  No cyanosis.  SKIN:  Warm, dry.  No erythema or rash.  NEUROLOGIC:  Alert, oriented x3, no focal deficit.  Cranial nerves II-XII are   grossly intact.     LABORATORY DATA:  Reviewed, revealed hemoglobin 8.8, sodium 138, potassium   5.0, CO2 of 17, BUN 38 and creatinine 2.4.  Brain natriuretic peptide _____.     ASSESSMENT AND PLAN:  The patient is a very pleasant 68-year-old female with   advanced chronic kidney disease, admitted to initiate hemodialysis treatment.  1.  End-stage renal disease, just started on dialysis today.  Continue daily.  2.  Electrolytes, well controlled, to monitor.  3.  Anemia, drop in hemoglobin level, to monitor.  Check iron panel.  4.  Metabolic acidosis, will be correcting with dialysis.  5.  Volume overloaded, to challenge ultrafiltration with hemodialysis as   tolerates.  6.  Hypertension, elevated blood pressure, should improve with ultrafiltration   with hemodialysis.     RECOMMENDATIONS:  1.  Daily dialysis.  To monitor hemoglobin level, basic metabolic panel, to   adjust all medications to renal doses.  Provide renal diet.  2.  Monitor hemoglobin and basic metabolic panel.  We will follow the patient   closely.     Thank you for the  consult.        ______________________________  MD VERITO STEELE/PAYTON    DD:  03/21/2024 17:06  DT:  03/21/2024 18:18    Job#:  [id number]

## 2024-03-22 NOTE — PROGRESS NOTES
Bedside report received from off going RN/tech: Juany , assumed care of patient.     Fall Risk Score: LOW RISK  Fall risk interventions in place: Place yellow fall risk ID band on patient, Provide patient/family education based on risk assessment, Educate patient/family to call staff for assistance when getting out of bed, Place fall precaution signage outside patient door, and Bed alarm connected correctly  Bed type: Regular (Zeus Score less than 17 interventions in place)  Patient on cardiac monitor: Yes  IVF/IV medications: Not Applicable   Oxygen: Room Air  Bedside sitter: Not Applicable   Isolation: Not applicable

## 2024-03-22 NOTE — PROGRESS NOTES
Hemodialysis #1 ordered by Dr. Anderson. Treatment started at 1526 and ended at 1756. Pt stable, vss, no c/o post tx. Net UF 1.0 L. Report to SHEMAR Cavazos RN.

## 2024-03-22 NOTE — DISCHARGE PLANNING
[facility] TCN chart review completed. Current discharge considerations are now for post acute placement given initial PT evaluation dc planning recs. Note that blanket referral was sent by  per  policy and pt has several accepting facilities (see below) at this time. As noted per prior TCN note, pt in agreement with post acute placement and reports will select/provide choice from final accepting facilities at time of dc as appropriate. TCN will monitor and did reach out to [facility] team for SNF auth given current recs and several accepting facilities. TCN will continue to follow and collaborate with discharge planning team as additional post acute needs arise. Thank you.    Completed:  PT with recommendations for post acute placement on 3/21  Choice obtained: none; blanket SNF referrals sent per  blanket referral policy; has 2 accepting as below with 3 additional facilities pending and 2 declinations  [facility]

## 2024-03-22 NOTE — PROGRESS NOTES
4 Eyes Skin Assessment Completed by MINI Leon and MINI Carranza.    Head WDL  Ears WDL  Nose WDL  Mouth WDL  Neck IJ removal dressing clean dry intact  Breast/Chest WDL  Shoulder Blades WDL  Spine WDL  (R) Arm/Elbow/Hand Bruising and Scab  (L) Arm/Elbow/Hand Bruising and Scab  Abdomen Bruising  Groin WDL  Scrotum/Coccyx/Buttocks WDL old healed scar   (R) Leg Scab and Bruising swelling  (L) Leg Scab and Bruising swelling  (R) Heel/Foot/Toe Blanching swelling  (L) Heel/Foot/Toe Blanching swelling          Devices In Places Blood Pressure Cuff      Interventions In Place Pillows    Possible Skin Injury No    Pictures Uploaded Into Epic Yes  Wound Consult Placed N/A  RN Wound Prevention Protocol Ordered No

## 2024-03-22 NOTE — PROGRESS NOTES
Patient back to room after dialysis. Patient to transfer to medical bed S620-1 once clean. Nurse report called to Carolyn.

## 2024-03-22 NOTE — DISCHARGE PLANNING
Outpatient Dialysis Placement Notification     Received notification for outpatient dialysis placement from Dr. Anderson.      Met with patient and confirmed demographics and insurance information.  Provided patient with dialysis education materials and list of HD centers.    Referral initiated to:    [facility]  [address] [place] [id number]     Pending medical and financial clearance.     Xitlaly Reyes   Dialysis Coordinator / [employer]   Ph: [phone]

## 2024-03-22 NOTE — PROCEDURES
Nephrology/Hemodialysis note    Patient with advanced CKD, progression to ESRD, new HD start  Seen and examined during dialysis  Tolerates well   VS stable  Feels much better  UF 1-2 L  Lab results reviewed  Please see dialysis flow sheet for details

## 2024-03-22 NOTE — RESPIRATORY CARE
"COPD EDUCATION by COPD CLINICAL EDUCATOR  3/22/2024  at  10:08 AM by Renata Chadwick, RRT     Patient interviewed by education team.  Patient declined or is unable to participate in the full program.  Therefore, a short intervention has been conducted.  A comprehensive packet including information about COPD, types of treatments to manage their disease and safe home Oxygen usage was provided and reviewed with patient at the bedside.     Smoking Cessation Intervention and education completed, 14 minutes spent on smoking cessation education with patient.  Provided smoking cessation packet with \"Tips to Quit\" and brochure for \"Free Virtual Smoking Cessation Classes\".   COPD Assessment  COPD Clinical Specialists ONLY  COPD Education Initiated: Yes--Short Intervention (met with pt -active smoker- wonderins about COPD dx never had PFT states has referral for Pulmonary (none seen) review of meds discussed What is COPD etc)  Is this a COPD exacerbation patient?: No  DME Company: pt does not known her DME  DME Equipment Type: Oxygen  Physician Name: MD Symone  Palliative Care: Yes (on service as outpt)  Advance Directive: Yes  Referrals Initiated: Yes  Smoking Cessation: Yes  $ Smoking Cessation >10 Minutes: Symptomatic  Smoking Pack Years: >60  [facility]: Yes  $ Demo/Eval of SVN's, MDI's and Aerosols: Yes  Interdisciplinary Rounds: Attendance at Rounds (30 Min)    PFT Results  No results found for: \"PFT\"    Meds to Beds  [facility] provides bedside medication delivery for all eligible patients at discharge.  Would you like to opt out of this program for any reason?: No - Stay Opted In     MY COPD ACTION PLAN     It is recommended that patients and physicians /healthcare providers complete this action plan together. This plan should be discussed at each physician visit and updated as needed.    The green, yellow and red zones show groups of symptoms of COPD. This list of symptoms is not comprehensive, and you " "may experience other symptoms. In the \"Actions\" column, your healthcare provider has recommended actions for you to take based on your symptoms.    Patient Name: Anu Manuel   YOB: 1955   Last Updated on: 3/22/2024 10:08 AM   Green Zone:  I am doing well today Actions     Usual activitiy and exercise level   Take daily medications     Usual amounts of cough and phlegm/mucus   Use oxygen as prescribed     Sleep well at night   Continue regular exercise/diet plan     Appetite is good   At all times avoid cigarette smoke, inhaled irritants     Daily Medications (these medications are taken every day):   Umeclidinium and Vilanterol (Anoro) 1 Puff Once daily        Yellow Zone:  I am having a bad day or a COPD flare Actions     More breathless than usual   Continue daily medications     I have less energy for my daily activities   Use quick relief inhaler as ordered     Increased or thicker phlegm/mucus   Use oxygen as prescribed     Using quick relief inhaler/nebulizer more often   Get plenty of rest     Swelling of ankles more than usual   Use pursed lip breathing     More coughing than usual   At all times avoid cigarette smoke, inhaled irritants     I feel like I have a \"chest cold\"     Poor sleep and my symptoms woke me up     My appetite is not good     My medicine is not helping      Call provider immediately if symptoms don’t improve     Continue daily medications, add rescue medications:   Albuterol 2 Puffs Every 6 hours PRN       Medications to be used during a flare up, (as Discussed with Provider):           Additional Information:  Use your spacer with your albuterol     Red Zone:  I need urgent medical care Actions     Severe shortness of breath even at rest   Call 911 or seek medical care immediately     Not able to do any activity because of breathing      Fever or shaking chills      Feeling confused or very drowsy       Chest pains      Coughing up blood                  "

## 2024-03-22 NOTE — CARE PLAN
Problem: Knowledge Deficit - Standard  Goal: Patient and family/care givers will demonstrate understanding of plan of care, disease process/condition, diagnostic tests and medications  Outcome: Progressing  Note: Poc discussed with pt.      Problem: Psychosocial  Goal: Patient's level of anxiety will decrease  Outcome: Progressing     Problem: Hemodynamics  Goal: Patient's hemodynamics, fluid balance and neurologic status will be stable or improve  Outcome: Progressing  Note: Monitor vital signs   The patient is Stable - Low risk of patient condition declining or worsening    Shift Goals  Clinical Goals: monitor vital signs, monitor nel and I/Os  Patient Goals: rest and comfort

## 2024-03-23 LAB
ALBUMIN SERPL BCP-MCNC: 2.7 G/DL (ref 3.2–4.9)
BUN SERPL-MCNC: 26 MG/DL (ref 8–22)
CALCIUM ALBUM COR SERPL-MCNC: 9.2 MG/DL (ref 8.5–10.5)
CALCIUM SERPL-MCNC: 8.2 MG/DL (ref 8.5–10.5)
CHLORIDE SERPL-SCNC: 97 MMOL/L (ref 96–112)
CO2 SERPL-SCNC: 21 MMOL/L (ref 20–33)
CREAT SERPL-MCNC: 1.81 MG/DL (ref 0.5–1.4)
ERYTHROCYTE [DISTWIDTH] IN BLOOD BY AUTOMATED COUNT: 58.2 FL (ref 35.9–50)
GFR SERPLBLD CREATININE-BSD FMLA CKD-EPI: 30 ML/MIN/1.73 M 2
GLUCOSE BLD STRIP.AUTO-MCNC: 270 MG/DL (ref 65–99)
GLUCOSE BLD STRIP.AUTO-MCNC: 287 MG/DL (ref 65–99)
GLUCOSE BLD STRIP.AUTO-MCNC: 364 MG/DL (ref 65–99)
GLUCOSE SERPL-MCNC: 320 MG/DL (ref 65–99)
HCT VFR BLD AUTO: 28 % (ref 37–47)
HGB BLD-MCNC: 8.9 G/DL (ref 12–16)
MCH RBC QN AUTO: 28.4 PG (ref 27–33)
MCHC RBC AUTO-ENTMCNC: 31.8 G/DL (ref 32.2–35.5)
MCV RBC AUTO: 89.5 FL (ref 81.4–97.8)
PHOSPHATE SERPL-MCNC: 3.7 MG/DL (ref 2.5–4.5)
PLATELET # BLD AUTO: 252 K/UL (ref 164–446)
PMV BLD AUTO: 10.8 FL (ref 9–12.9)
POTASSIUM SERPL-SCNC: 4.5 MMOL/L (ref 3.6–5.5)
RBC # BLD AUTO: 3.13 M/UL (ref 4.2–5.4)
SODIUM SERPL-SCNC: 133 MMOL/L (ref 135–145)
WBC # BLD AUTO: 7.6 K/UL (ref 4.8–10.8)

## 2024-03-23 PROCEDURE — A9270 NON-COVERED ITEM OR SERVICE: HCPCS | Performed by: INTERNAL MEDICINE

## 2024-03-23 PROCEDURE — 82962 GLUCOSE BLOOD TEST: CPT

## 2024-03-23 PROCEDURE — 700111 HCHG RX REV CODE 636 W/ 250 OVERRIDE (IP)

## 2024-03-23 PROCEDURE — 700102 HCHG RX REV CODE 250 W/ 637 OVERRIDE(OP): Performed by: INTERNAL MEDICINE

## 2024-03-23 PROCEDURE — 700102 HCHG RX REV CODE 250 W/ 637 OVERRIDE(OP): Performed by: STUDENT IN AN ORGANIZED HEALTH CARE EDUCATION/TRAINING PROGRAM

## 2024-03-23 PROCEDURE — 700102 HCHG RX REV CODE 250 W/ 637 OVERRIDE(OP)

## 2024-03-23 PROCEDURE — 90935 HEMODIALYSIS ONE EVALUATION: CPT | Performed by: INTERNAL MEDICINE

## 2024-03-23 PROCEDURE — 36415 COLL VENOUS BLD VENIPUNCTURE: CPT

## 2024-03-23 PROCEDURE — 90935 HEMODIALYSIS ONE EVALUATION: CPT

## 2024-03-23 PROCEDURE — 99232 SBSQ HOSP IP/OBS MODERATE 35: CPT | Performed by: INTERNAL MEDICINE

## 2024-03-23 PROCEDURE — A9270 NON-COVERED ITEM OR SERVICE: HCPCS

## 2024-03-23 PROCEDURE — 770006 HCHG ROOM/CARE - MED/SURG/GYN SEMI*

## 2024-03-23 PROCEDURE — 700111 HCHG RX REV CODE 636 W/ 250 OVERRIDE (IP): Performed by: INTERNAL MEDICINE

## 2024-03-23 PROCEDURE — 80069 RENAL FUNCTION PANEL: CPT

## 2024-03-23 PROCEDURE — 85027 COMPLETE CBC AUTOMATED: CPT

## 2024-03-23 PROCEDURE — A9270 NON-COVERED ITEM OR SERVICE: HCPCS | Performed by: STUDENT IN AN ORGANIZED HEALTH CARE EDUCATION/TRAINING PROGRAM

## 2024-03-23 RX ORDER — HEPARIN SODIUM 1000 [USP'U]/ML
INJECTION, SOLUTION INTRAVENOUS; SUBCUTANEOUS
Status: COMPLETED
Start: 2024-03-23 | End: 2024-03-23

## 2024-03-23 RX ORDER — TORSEMIDE 100 MG/1
100 TABLET ORAL
Status: DISCONTINUED | OUTPATIENT
Start: 2024-03-23 | End: 2024-03-26 | Stop reason: HOSPADM

## 2024-03-23 RX ADMIN — ATORVASTATIN CALCIUM 40 MG: 40 TABLET, FILM COATED ORAL at 20:32

## 2024-03-23 RX ADMIN — HEPARIN SODIUM 3600 UNITS: 1000 INJECTION, SOLUTION INTRAVENOUS; SUBCUTANEOUS at 11:16

## 2024-03-23 RX ADMIN — INSULIN LISPRO 3 UNITS: 100 INJECTION, SOLUTION INTRAVENOUS; SUBCUTANEOUS at 20:33

## 2024-03-23 RX ADMIN — AMLODIPINE BESYLATE 5 MG: 5 TABLET ORAL at 06:23

## 2024-03-23 RX ADMIN — OMEPRAZOLE 20 MG: 20 CAPSULE, DELAYED RELEASE ORAL at 06:23

## 2024-03-23 RX ADMIN — UMECLIDINIUM BROMIDE AND VILANTEROL TRIFENATATE 1 PUFF: 62.5; 25 POWDER RESPIRATORY (INHALATION) at 06:28

## 2024-03-23 RX ADMIN — INSULIN LISPRO 5 UNITS: 100 INJECTION, SOLUTION INTRAVENOUS; SUBCUTANEOUS at 18:03

## 2024-03-23 RX ADMIN — HEPARIN SODIUM 5000 UNITS: 5000 INJECTION, SOLUTION INTRAVENOUS; SUBCUTANEOUS at 14:07

## 2024-03-23 RX ADMIN — OMEPRAZOLE 20 MG: 20 CAPSULE, DELAYED RELEASE ORAL at 17:56

## 2024-03-23 RX ADMIN — LORAZEPAM 0.5 MG: 0.5 TABLET ORAL at 16:01

## 2024-03-23 RX ADMIN — LEVOTHYROXINE SODIUM 250 MCG: 0.12 TABLET ORAL at 06:22

## 2024-03-23 RX ADMIN — LORAZEPAM 0.5 MG: 0.5 TABLET ORAL at 23:14

## 2024-03-23 RX ADMIN — LORAZEPAM 0.5 MG: 0.5 TABLET ORAL at 05:26

## 2024-03-23 RX ADMIN — ALBUTEROL SULFATE 2 PUFF: 90 AEROSOL, METERED RESPIRATORY (INHALATION) at 03:26

## 2024-03-23 RX ADMIN — OXYCODONE HYDROCHLORIDE 15 MG: 15 TABLET ORAL at 20:41

## 2024-03-23 RX ADMIN — NICOTINE 14 MG: 14 PATCH TRANSDERMAL at 06:25

## 2024-03-23 RX ADMIN — TORSEMIDE 100 MG: 100 TABLET ORAL at 14:15

## 2024-03-23 RX ADMIN — INSULIN LISPRO 5 UNITS: 100 INJECTION, SOLUTION INTRAVENOUS; SUBCUTANEOUS at 14:09

## 2024-03-23 RX ADMIN — LABETALOL HYDROCHLORIDE 10 MG: 5 INJECTION INTRAVENOUS at 20:36

## 2024-03-23 RX ADMIN — VENLAFAXINE HYDROCHLORIDE 150 MG: 75 CAPSULE, EXTENDED RELEASE ORAL at 06:20

## 2024-03-23 RX ADMIN — INSULIN LISPRO 3 UNITS: 100 INJECTION, SOLUTION INTRAVENOUS; SUBCUTANEOUS at 18:02

## 2024-03-23 RX ADMIN — TRAZODONE HYDROCHLORIDE 100 MG: 100 TABLET ORAL at 20:40

## 2024-03-23 RX ADMIN — METOPROLOL SUCCINATE 200 MG: 100 TABLET, EXTENDED RELEASE ORAL at 06:24

## 2024-03-23 RX ADMIN — HEPARIN SODIUM 5000 UNITS: 5000 INJECTION, SOLUTION INTRAVENOUS; SUBCUTANEOUS at 06:28

## 2024-03-23 RX ADMIN — OXYCODONE HYDROCHLORIDE 15 MG: 15 TABLET ORAL at 14:07

## 2024-03-23 RX ADMIN — LIOTHYRONINE SODIUM 5 MCG: 5 TABLET ORAL at 06:21

## 2024-03-23 RX ADMIN — INSULIN LISPRO 5 UNITS: 100 INJECTION, SOLUTION INTRAVENOUS; SUBCUTANEOUS at 14:08

## 2024-03-23 ASSESSMENT — ENCOUNTER SYMPTOMS
PHOTOPHOBIA: 0
NAUSEA: 0
BLURRED VISION: 0
CONSTIPATION: 0
HEMOPTYSIS: 0
SPEECH CHANGE: 0
CHILLS: 0
DOUBLE VISION: 0
VOMITING: 0
NECK PAIN: 0
DIARRHEA: 0
CLAUDICATION: 0
WEIGHT LOSS: 0
PALPITATIONS: 0
COUGH: 0
WEAKNESS: 0
ABDOMINAL PAIN: 0
DIZZINESS: 0
MYALGIAS: 0
ORTHOPNEA: 0
FEVER: 0
SHORTNESS OF BREATH: 1

## 2024-03-23 ASSESSMENT — PAIN DESCRIPTION - PAIN TYPE
TYPE: ACUTE PAIN

## 2024-03-23 NOTE — PROGRESS NOTES
Pt requesting prilosec, Dr Job Shahid MD notified, verbal orders for omeprazole 20mg twice daily.

## 2024-03-23 NOTE — PROGRESS NOTES
Hemodialysis # 3 ordered by Dr. Anderson. Treatment started at 0815 and ended at 1115. Pt stable, vss, no c/o post tx. Net UF 2.5 L. Report to ALISTAIR Malagon RN.

## 2024-03-23 NOTE — CARE PLAN
The patient is Stable - Low risk of patient condition declining or worsening    Shift Goals  Clinical Goals: monitor anxiety, blood glucose throughout shift  Patient Goals: rest, ease anxiety  Family Goals: RILEY    Progress made toward(s) clinical / shift goals:     Pt expresses concerns and need for PRN anxiety medication, administered per MAR. Expressed interest in coping skills such as journaling.    Problem: Knowledge Deficit - Standard  Goal: Patient and family/care givers will demonstrate understanding of plan of care, disease process/condition, diagnostic tests and medications  Outcome: Progressing     Problem: Psychosocial  Goal: Patient's level of anxiety will decrease  Outcome: Progressing     Problem: Psychosocial  Goal: Patient and family will demonstrate ability to cope with life altering diagnosis and/or procedure  Outcome: Progressing       Patient is not progressing towards the following goals:

## 2024-03-23 NOTE — DISCHARGE PLANNING
[facility] TCN chart review completed. As prior, current discharge considerations are now for post acute placement given initial PT evaluation dc planning recs. Note that blanket referral was sent by  per  policy and pt has several accepting facilities (see below) at this time. As noted per prior TCN note, pt in agreement with post acute placement and reports will select/provide choice from final accepting facilities at time of dc as appropriate. Noted per review, pt is medically cleared per MD note from 1:07PM and TCN has obtained auth for dc to SNF once HD chair available. TCN will continue to follow and collaborate with discharge planning team as additional post acute needs arise. Thank you.     Completed:  PT with recommendations for post acute placement on 3/21  Choice obtained: none; blanket SNF referrals sent per  blanket referral policy; has 2 accepting as below with 3 additional facilities pending and 2 declinations  [facility]

## 2024-03-23 NOTE — PROCEDURES
Nephrology/Hemodialysis note    Patient with advanced CKD, progression to ESRD, new HD start  Seen and examined during dialysis  Doing well, no complaints  Tolerates dialysis well  VS : elevated BP to increase UF with HD- 3-4 L  Scheduled additional PUF tomorrow for better volume control  Lab results reviewed  Please see dialysis flow sheet for details

## 2024-03-23 NOTE — PROGRESS NOTES
[facility] Medicine Daily Progress Note    Date of Service  3/23/2024    Chief Complaint  Anu Manuel is a 68 y.o. female admitted 3/20/2024 with weakness    Hospital Course:    68-year-old female with history of diabetes, hypertension chronic kidney disease, hypothyroidism, dyslipidemia and depression who was send to ED on 3/20 to start dialysis.  Patient has chronic kidney disease due to diabetes and being followed by nephrology clinic, patient has had generalized weakness with gaining weight and lower extremity edema.  Evaluated by nephrologist to transfer her to the ED.  On admission blood pressure was not controlled, no fever or chills, patient needed 2 L nasal cannula.  Labs showed chronic anemia with creatinine 2.4, subcutaneous dialysis catheter was placed by IR and started dialysis.        Interval Problem Update  -Evaluated examined the patient at bedside after dialysis, patient was alert oriented x 4, denied any new symptoms, improving on her weakness and lower extremity edema.  -Patient is on room air, uncontrolled hypertension, continue metoprolol amlodipine and torsemide, patient still making urine.  -Case was discussed with Dr. Anderson  nephrologist, waiting for dialysis chair before discharge.        I have discussed this patient's plan of care and discharge plan at IDT rounds today with Case Management, Nursing, Nursing leadership, and other members of the IDT team.    Consultants/Specialty  Nephrologist and IR    Code Status  DNAR/DNI    Disposition  The patient is medically cleared for discharge to home or a post-acute facility.  Anticipate discharge to: home with close outpatient follow-up    I have placed the appropriate orders for post-discharge needs.    Review of Systems  Review of Systems   Constitutional:  Positive for malaise/fatigue. Negative for chills, fever and weight loss.   HENT:  Negative for ear pain, hearing loss and tinnitus.    Eyes:  Negative for blurred vision, double  vision and photophobia.   Respiratory:  Positive for shortness of breath. Negative for cough and hemoptysis.    Cardiovascular:  Negative for chest pain, palpitations, orthopnea and claudication.   Gastrointestinal:  Negative for abdominal pain, constipation, diarrhea, nausea and vomiting.   Genitourinary:  Negative for dysuria, frequency and urgency.   Musculoskeletal:  Negative for myalgias and neck pain.   Skin:  Negative for rash.   Neurological:  Negative for dizziness, speech change and weakness.        Physical Exam  Temp:  [36.4 °C (97.5 °F)-36.9 °C (98.4 °F)] 36.8 °C (98.3 °F)  Pulse:  [46-82] 46  Resp:  [17-20] 18  BP: (138-158)/(75-86) 142/85  SpO2:  [91 %-96 %] 92 %    Physical Exam  Constitutional:       General: She is not in acute distress.     Appearance: She is not ill-appearing.   HENT:      Head: Normocephalic and atraumatic.   Eyes:      General: No scleral icterus.  Cardiovascular:      Rate and Rhythm: Normal rate.      Heart sounds: No murmur heard.  Pulmonary:      Effort: No respiratory distress.      Breath sounds: No wheezing.   Abdominal:      General: There is no distension.      Tenderness: There is no abdominal tenderness. There is no guarding.   Musculoskeletal:      Right lower leg: No edema.      Left lower leg: No edema.   Skin:     Coloration: Skin is not jaundiced or pale.      Findings: No bruising or lesion.   Neurological:      General: No focal deficit present.      Mental Status: She is oriented to person, place, and time. Mental status is at baseline.      Cranial Nerves: No cranial nerve deficit.      Motor: No weakness.         Fluids    Intake/Output Summary (Last 24 hours) at 3/23/2024 1310  Last data filed at 3/23/2024 0500  Gross per 24 hour   Intake 900 ml   Output 2500 ml   Net -1600 ml       Laboratory  Recent Labs     03/20/24  1709 03/22/24  0116 03/23/24  0604   WBC 10.1 8.1 7.6   RBC 3.00* 2.95* 3.13*   HEMOGLOBIN 8.8* 8.4* 8.9*   HEMATOCRIT 27.1* 26.3* 28.0*    MCV 90.3 89.2 89.5   MCH 29.3 28.5 28.4   MCHC 32.5 31.9* 31.8*   RDW 61.1* 58.4* 58.2*   PLATELETCT 289 248 252   MPV 11.2 11.1 10.8     Recent Labs     03/20/24  1757 03/22/24  0116 03/23/24  0604   SODIUM 138 135 133*   POTASSIUM 5.0 4.3 4.5   CHLORIDE 107 102 97   CO2 17* 19* 21   GLUCOSE 119* 322* 320*   BUN 38* 28* 26*   CREATININE 2.40* 1.92* 1.81*   CALCIUM 8.5 7.9* 8.2*     Recent Labs     03/21/24  0905   INR 1.03               Imaging  IR-KERRY PATINO PLACEMENT >5   Final Result      Successful image guided tunneled dialysis catheter placement.      Plan: The catheter is available for immediate use.         EC-ECHOCARDIOGRAM COMPLETE W/O CONT   Final Result      DX-CHEST-PORTABLE (1 VIEW)   Final Result      Small pleural effusions.           Assessment/Plan  * ESRD needing dialysis ([facility])  Assessment & Plan  Likely related to uncontrolled diabetes and hypertension   Came with generalized weakness   Subcutaneous dialysis catheter was placed and started with dialysis  Chronic anemia, check iron panel  Monitor phosphorus and calcium  Nephrology on board, appreciate recommendations.    Chronic obstructive pulmonary disease- (present on admission)  Assessment & Plan  Resume home medications  DuoNebs as needed    CKD (chronic kidney disease) stage 4, GFR 15-29 ml/min ([facility])- (present on admission)  Assessment & Plan  Nephrology on board      Hyperlipidemia  Assessment & Plan  Lipitor    DM (diabetes mellitus) ([facility])  Assessment & Plan  Sliding scale  A1c 8.7    ACP (advance care planning)  Assessment & Plan  16 minutes spent discussing goals of care with patient.  She was explained that she has fluid overload likely due to her chronic kidney disease and that she will require dialysis for fluid removal.  She was asked because that is, to which he stated she would like to be DNR/DNI, okay for medical treatment.    MDD (major depressive disorder)  Assessment & Plan  Resume home medications venlafaxine and  trazodone    Hypothyroidism- (present on admission)  Assessment & Plan  Continue synthroid 250 mcg daily and Cytomel 5 mcg daily TSH 40    Patient is to follow-up closely with PCP to repeat labs and increase dose of levothyroxine         VTE prophylaxis:   SCDs/TEDs   heparin ppx      I have performed a physical exam and reviewed and updated ROS and Plan today (3/23/2024). In review of yesterday's note (3/22/2024), there are no changes except as documented above.

## 2024-03-23 NOTE — PROGRESS NOTES
[facility] Progress Note:     Hemodialysis Treatment ordered by Dr Benz today x 3 hrs. Treatment initiated at 1034 , ended at 1334.     Pt A/Ox3, VSS, no discomfort reported.     Pt stable, tolerated tx well, VSS.See e-flowsheets for further details.     Net UF : 2,000 mL     Post tx CVC care : R IJ tunneled HD ports cleansed per protocol. Flushed with NS, locked with Heparin 1mL/ 1000 units per designated amount each port, clamped and capped, CVC dressing changed yesterday, CDI.     Report given to primary RN.

## 2024-03-23 NOTE — HOSPITAL COURSE
68-year-old female with history of diabetes, hypertension chronic kidney disease, hypothyroidism, dyslipidemia and depression who was send to ED on 3/20 to start dialysis.  Patient has chronic kidney disease due to diabetes and being followed by nephrology clinic, patient has had generalized weakness with gaining weight and lower extremity edema.  Evaluated by nephrologist to transfer her to the ED.  On admission blood pressure was not controlled, no fever or chills, patient needed 2 L nasal cannula.  Labs showed chronic anemia with creatinine 2.4, subcutaneous dialysis catheter was placed by IR and started dialysis.

## 2024-03-24 LAB
ANION GAP SERPL CALC-SCNC: 14 MMOL/L (ref 7–16)
BASOPHILS # BLD AUTO: 1.5 % (ref 0–1.8)
BASOPHILS # BLD: 0.11 K/UL (ref 0–0.12)
BUN SERPL-MCNC: 29 MG/DL (ref 8–22)
CALCIUM SERPL-MCNC: 7.9 MG/DL (ref 8.5–10.5)
CHLORIDE SERPL-SCNC: 95 MMOL/L (ref 96–112)
CO2 SERPL-SCNC: 23 MMOL/L (ref 20–33)
CREAT SERPL-MCNC: 1.96 MG/DL (ref 0.5–1.4)
EOSINOPHIL # BLD AUTO: 0.18 K/UL (ref 0–0.51)
EOSINOPHIL NFR BLD: 2.4 % (ref 0–6.9)
ERYTHROCYTE [DISTWIDTH] IN BLOOD BY AUTOMATED COUNT: 55.1 FL (ref 35.9–50)
FERRITIN SERPL-MCNC: 92.7 NG/ML (ref 10–291)
GAMMA INTERFERON BACKGROUND BLD IA-ACNC: 0.03 IU/ML
GFR SERPLBLD CREATININE-BSD FMLA CKD-EPI: 28 ML/MIN/1.73 M 2
GLUCOSE BLD STRIP.AUTO-MCNC: 229 MG/DL (ref 65–99)
GLUCOSE BLD STRIP.AUTO-MCNC: 316 MG/DL (ref 65–99)
GLUCOSE BLD STRIP.AUTO-MCNC: 356 MG/DL (ref 65–99)
GLUCOSE BLD STRIP.AUTO-MCNC: 362 MG/DL (ref 65–99)
GLUCOSE SERPL-MCNC: 360 MG/DL (ref 65–99)
HCT VFR BLD AUTO: 25.7 % (ref 37–47)
HGB BLD-MCNC: 8.3 G/DL (ref 12–16)
IMM GRANULOCYTES # BLD AUTO: 0.02 K/UL (ref 0–0.11)
IMM GRANULOCYTES NFR BLD AUTO: 0.3 % (ref 0–0.9)
IRON SATN MFR SERPL: 28 % (ref 15–55)
IRON SERPL-MCNC: 73 UG/DL (ref 40–170)
LYMPHOCYTES # BLD AUTO: 1.12 K/UL (ref 1–4.8)
LYMPHOCYTES NFR BLD: 14.9 % (ref 22–41)
M TB IFN-G BLD-IMP: ABNORMAL
M TB IFN-G CD4+ BCKGRND COR BLD-ACNC: 0 IU/ML
MAGNESIUM SERPL-MCNC: 1.9 MG/DL (ref 1.5–2.5)
MCH RBC QN AUTO: 28.5 PG (ref 27–33)
MCHC RBC AUTO-ENTMCNC: 32.3 G/DL (ref 32.2–35.5)
MCV RBC AUTO: 88.3 FL (ref 81.4–97.8)
MITOGEN IGNF BCKGRD COR BLD-ACNC: 0.15 IU/ML
MONOCYTES # BLD AUTO: 1.02 K/UL (ref 0–0.85)
MONOCYTES NFR BLD AUTO: 13.5 % (ref 0–13.4)
NEUTROPHILS # BLD AUTO: 5.09 K/UL (ref 1.82–7.42)
NEUTROPHILS NFR BLD: 67.4 % (ref 44–72)
NRBC # BLD AUTO: 0 K/UL
NRBC BLD-RTO: 0 /100 WBC (ref 0–0.2)
PHOSPHATE SERPL-MCNC: 3.9 MG/DL (ref 2.5–4.5)
PLATELET # BLD AUTO: 238 K/UL (ref 164–446)
PMV BLD AUTO: 10.9 FL (ref 9–12.9)
POTASSIUM SERPL-SCNC: 3.6 MMOL/L (ref 3.6–5.5)
QFT TB2 - NIL TBQ2: 0.01 IU/ML
RBC # BLD AUTO: 2.91 M/UL (ref 4.2–5.4)
SODIUM SERPL-SCNC: 132 MMOL/L (ref 135–145)
TIBC SERPL-MCNC: 259 UG/DL (ref 250–450)
UIBC SERPL-MCNC: 186 UG/DL (ref 110–370)
WBC # BLD AUTO: 7.5 K/UL (ref 4.8–10.8)

## 2024-03-24 PROCEDURE — 99233 SBSQ HOSP IP/OBS HIGH 50: CPT | Performed by: INTERNAL MEDICINE

## 2024-03-24 PROCEDURE — 700102 HCHG RX REV CODE 250 W/ 637 OVERRIDE(OP): Performed by: INTERNAL MEDICINE

## 2024-03-24 PROCEDURE — 83735 ASSAY OF MAGNESIUM: CPT

## 2024-03-24 PROCEDURE — 80048 BASIC METABOLIC PNL TOTAL CA: CPT

## 2024-03-24 PROCEDURE — 85025 COMPLETE CBC W/AUTO DIFF WBC: CPT

## 2024-03-24 PROCEDURE — 770006 HCHG ROOM/CARE - MED/SURG/GYN SEMI*

## 2024-03-24 PROCEDURE — A9270 NON-COVERED ITEM OR SERVICE: HCPCS | Performed by: INTERNAL MEDICINE

## 2024-03-24 PROCEDURE — A9270 NON-COVERED ITEM OR SERVICE: HCPCS

## 2024-03-24 PROCEDURE — 83540 ASSAY OF IRON: CPT

## 2024-03-24 PROCEDURE — 700102 HCHG RX REV CODE 250 W/ 637 OVERRIDE(OP): Performed by: STUDENT IN AN ORGANIZED HEALTH CARE EDUCATION/TRAINING PROGRAM

## 2024-03-24 PROCEDURE — 84100 ASSAY OF PHOSPHORUS: CPT

## 2024-03-24 PROCEDURE — A9270 NON-COVERED ITEM OR SERVICE: HCPCS | Performed by: STUDENT IN AN ORGANIZED HEALTH CARE EDUCATION/TRAINING PROGRAM

## 2024-03-24 PROCEDURE — 82962 GLUCOSE BLOOD TEST: CPT

## 2024-03-24 PROCEDURE — 700102 HCHG RX REV CODE 250 W/ 637 OVERRIDE(OP)

## 2024-03-24 PROCEDURE — 90935 HEMODIALYSIS ONE EVALUATION: CPT

## 2024-03-24 PROCEDURE — 700111 HCHG RX REV CODE 636 W/ 250 OVERRIDE (IP)

## 2024-03-24 PROCEDURE — 82728 ASSAY OF FERRITIN: CPT

## 2024-03-24 PROCEDURE — 36415 COLL VENOUS BLD VENIPUNCTURE: CPT

## 2024-03-24 PROCEDURE — 83550 IRON BINDING TEST: CPT

## 2024-03-24 PROCEDURE — 90935 HEMODIALYSIS ONE EVALUATION: CPT | Performed by: INTERNAL MEDICINE

## 2024-03-24 RX ORDER — TRAZODONE HYDROCHLORIDE 150 MG/1
150 TABLET ORAL
Status: DISCONTINUED | OUTPATIENT
Start: 2024-03-24 | End: 2024-03-26 | Stop reason: HOSPADM

## 2024-03-24 RX ORDER — HYDRALAZINE HYDROCHLORIDE 50 MG/1
25 TABLET, FILM COATED ORAL EVERY 8 HOURS
Status: DISCONTINUED | OUTPATIENT
Start: 2024-03-24 | End: 2024-03-26 | Stop reason: HOSPADM

## 2024-03-24 RX ORDER — OMEPRAZOLE 20 MG/1
40 CAPSULE, DELAYED RELEASE ORAL DAILY
Status: DISCONTINUED | OUTPATIENT
Start: 2024-03-24 | End: 2024-03-26 | Stop reason: HOSPADM

## 2024-03-24 RX ORDER — HEPARIN SODIUM 1000 [USP'U]/ML
INJECTION, SOLUTION INTRAVENOUS; SUBCUTANEOUS
Status: COMPLETED
Start: 2024-03-24 | End: 2024-03-24

## 2024-03-24 RX ADMIN — INSULIN LISPRO 5 UNITS: 100 INJECTION, SOLUTION INTRAVENOUS; SUBCUTANEOUS at 07:18

## 2024-03-24 RX ADMIN — AMLODIPINE BESYLATE 5 MG: 5 TABLET ORAL at 04:31

## 2024-03-24 RX ADMIN — ATORVASTATIN CALCIUM 40 MG: 40 TABLET, FILM COATED ORAL at 20:47

## 2024-03-24 RX ADMIN — HYDRALAZINE HYDROCHLORIDE 25 MG: 50 TABLET ORAL at 14:59

## 2024-03-24 RX ADMIN — LORAZEPAM 0.5 MG: 0.5 TABLET ORAL at 07:17

## 2024-03-24 RX ADMIN — INSULIN LISPRO 2 UNITS: 100 INJECTION, SOLUTION INTRAVENOUS; SUBCUTANEOUS at 20:42

## 2024-03-24 RX ADMIN — UMECLIDINIUM BROMIDE AND VILANTEROL TRIFENATATE 1 PUFF: 62.5; 25 POWDER RESPIRATORY (INHALATION) at 04:41

## 2024-03-24 RX ADMIN — TORSEMIDE 100 MG: 100 TABLET ORAL at 04:37

## 2024-03-24 RX ADMIN — LIOTHYRONINE SODIUM 5 MCG: 5 TABLET ORAL at 04:32

## 2024-03-24 RX ADMIN — METOPROLOL SUCCINATE 200 MG: 100 TABLET, EXTENDED RELEASE ORAL at 04:38

## 2024-03-24 RX ADMIN — NICOTINE 14 MG: 14 PATCH TRANSDERMAL at 04:39

## 2024-03-24 RX ADMIN — INSULIN GLARGINE-YFGN 10 UNITS: 100 INJECTION, SOLUTION SUBCUTANEOUS at 17:52

## 2024-03-24 RX ADMIN — INSULIN LISPRO 5 UNITS: 100 INJECTION, SOLUTION INTRAVENOUS; SUBCUTANEOUS at 07:20

## 2024-03-24 RX ADMIN — OMEPRAZOLE 20 MG: 20 CAPSULE, DELAYED RELEASE ORAL at 06:00

## 2024-03-24 RX ADMIN — TRAZODONE HYDROCHLORIDE 150 MG: 150 TABLET ORAL at 20:46

## 2024-03-24 RX ADMIN — INSULIN LISPRO 5 UNITS: 100 INJECTION, SOLUTION INTRAVENOUS; SUBCUTANEOUS at 17:54

## 2024-03-24 RX ADMIN — OMEPRAZOLE 40 MG: 20 CAPSULE, DELAYED RELEASE ORAL at 11:17

## 2024-03-24 RX ADMIN — VENLAFAXINE HYDROCHLORIDE 150 MG: 75 CAPSULE, EXTENDED RELEASE ORAL at 04:33

## 2024-03-24 RX ADMIN — HYDRALAZINE HYDROCHLORIDE 25 MG: 50 TABLET ORAL at 20:48

## 2024-03-24 RX ADMIN — HEPARIN SODIUM 3600 UNITS: 1000 INJECTION, SOLUTION INTRAVENOUS; SUBCUTANEOUS at 13:50

## 2024-03-24 RX ADMIN — OXYCODONE HYDROCHLORIDE 15 MG: 15 TABLET ORAL at 07:17

## 2024-03-24 RX ADMIN — INSULIN LISPRO 5 UNITS: 100 INJECTION, SOLUTION INTRAVENOUS; SUBCUTANEOUS at 15:00

## 2024-03-24 RX ADMIN — LEVOTHYROXINE SODIUM 250 MCG: 0.12 TABLET ORAL at 04:32

## 2024-03-24 RX ADMIN — INSULIN LISPRO 5 UNITS: 100 INJECTION, SOLUTION INTRAVENOUS; SUBCUTANEOUS at 17:52

## 2024-03-24 ASSESSMENT — ENCOUNTER SYMPTOMS
WEAKNESS: 0
WEIGHT LOSS: 0
COUGH: 0
DOUBLE VISION: 0
PALPITATIONS: 0
ABDOMINAL PAIN: 0
NECK PAIN: 0
HEMOPTYSIS: 0
VOMITING: 0
WHEEZING: 0
SPEECH CHANGE: 0
MYALGIAS: 0
BLURRED VISION: 0
EYES NEGATIVE: 1
DIZZINESS: 0
NAUSEA: 0
FEVER: 0
DIARRHEA: 0
PHOTOPHOBIA: 0
CLAUDICATION: 0
SHORTNESS OF BREATH: 0
CONSTIPATION: 0
SINUS PAIN: 0
ORTHOPNEA: 0
CHILLS: 0

## 2024-03-24 ASSESSMENT — PAIN DESCRIPTION - PAIN TYPE
TYPE: ACUTE PAIN
TYPE: ACUTE PAIN

## 2024-03-24 NOTE — PROGRESS NOTES
BP reading noted as 167/76 at 1954. Medicated per MAR. All needs met at this time, patient resting quietly.

## 2024-03-24 NOTE — CARE PLAN
The patient is Stable - Low risk of patient condition declining or worsening    Shift Goals  Clinical Goals: pain management from 7 to 4 throughout shift  Patient Goals: rest  Family Goals: RILEY    Progress made toward(s) clinical / shift goals:   Problem: Psychosocial  Goal: Patient's level of anxiety will decrease  Outcome: Progressing   Pt expresses anxiety and need for medication. Medicated per MAR.  Problem: Communication  Goal: The ability to communicate needs accurately and effectively will improve  Outcome: Progressing   Pt expresses need for pain medication when in pain. Medicated per MAR. Able to rest quietly.   Problem: Hemodynamics  Goal: Patient's hemodynamics, fluid balance and neurologic status will be stable or improve  Outcome: Progressing  RLE edema improving. LLE edema still present, 1+ pitting.     Patient is not progressing towards the following goals:

## 2024-03-24 NOTE — PROGRESS NOTES
[facility] Medicine Daily Progress Note    Date of Service  3/24/2024    Chief Complaint  Anu Manuel is a 68 y.o. female admitted 3/20/2024 with weakness    Hospital Course:    68-year-old female with history of diabetes, hypertension chronic kidney disease, hypothyroidism, dyslipidemia and depression who was send to ED on 3/20 to start dialysis.  Patient has chronic kidney disease due to diabetes and being followed by nephrology clinic, patient has had generalized weakness with gaining weight and lower extremity edema.  Evaluated by nephrologist to transfer her to the ED.  On admission blood pressure was not controlled, no fever or chills, patient needed 2 L nasal cannula.  Labs showed chronic anemia with creatinine 2.4, subcutaneous dialysis catheter was placed by IR and started dialysis.        Interval Problem Update  -Evaluated examined the patient at bedside after dialysis, patient was alert oriented x 4, no symptoms however patient still having lower extremity edema, patient is on room air and no significant wheezing  -Uncontrolled diabetes, start with Lantus 10 units daily, patient states she is taking 25 units at home.  -= Uncontrolled hypertension, start with hydralazine and continue amlodipine and metoprolol.  -Waiting for dialysis chair before discharge  -PT and OT however patient refused placement and she is very anxious to be discharged, discussed the importance of getting dialysis chair confirmation before discharge to avoid losing dialysis, patient understood.  -Patient asking for Xanax, discussed the risk of the medication, patient has history of opioid abuse, avoid any addictive medications.      I have discussed this patient's plan of care and discharge plan at IDT rounds today with Case Management, Nursing, Nursing leadership, and other members of the IDT team.    Consultants/Specialty  Nephrologist and IR    Code Status  DNAR/DNI    Disposition  The patient is not medically cleared  for discharge to home or a post-acute facility.  Anticipate discharge to: home with organized home healthcare and close outpatient follow-up    I have placed the appropriate orders for post-discharge needs.    Review of Systems  Review of Systems   Constitutional:  Negative for chills, fever, malaise/fatigue and weight loss.   HENT:  Negative for ear pain, hearing loss and tinnitus.    Eyes:  Negative for blurred vision, double vision and photophobia.   Respiratory:  Negative for cough, hemoptysis and shortness of breath.    Cardiovascular:  Negative for chest pain, palpitations, orthopnea and claudication.   Gastrointestinal:  Negative for abdominal pain, constipation, diarrhea, nausea and vomiting.   Genitourinary:  Negative for dysuria, frequency and urgency.   Musculoskeletal:  Negative for myalgias and neck pain.   Skin:  Negative for rash.   Neurological:  Negative for dizziness, speech change and weakness.        Physical Exam  Temp:  [36.4 °C (97.5 °F)-37.2 °C (98.9 °F)] 37.2 °C (98.9 °F)  Pulse:  [75-83] 79  Resp:  [17-18] 18  BP: (134-177)/(72-91) 152/76  SpO2:  [92 %-95 %] 95 %    Physical Exam  Constitutional:       General: She is not in acute distress.     Appearance: She is not ill-appearing.   HENT:      Head: Normocephalic and atraumatic.   Eyes:      General: No scleral icterus.  Cardiovascular:      Rate and Rhythm: Normal rate.      Heart sounds: No murmur heard.  Pulmonary:      Effort: No respiratory distress.      Breath sounds: Rales present. No wheezing.   Abdominal:      General: There is no distension.      Tenderness: There is no abdominal tenderness. There is no guarding.   Musculoskeletal:      Right lower leg: Edema present.      Left lower leg: Edema present.   Skin:     Coloration: Skin is not jaundiced or pale.      Findings: No bruising or lesion.   Neurological:      General: No focal deficit present.      Mental Status: She is oriented to person, place, and time. Mental status is  at baseline.      Cranial Nerves: No cranial nerve deficit.      Motor: No weakness.         Fluids    Intake/Output Summary (Last 24 hours) at 3/24/2024 0952  Last data filed at 3/23/2024 2134  Gross per 24 hour   Intake 740 ml   Output 3000 ml   Net -2260 ml       Laboratory  Recent Labs     03/22/24  0116 03/23/24  0604   WBC 8.1 7.6   RBC 2.95* 3.13*   HEMOGLOBIN 8.4* 8.9*   HEMATOCRIT 26.3* 28.0*   MCV 89.2 89.5   MCH 28.5 28.4   MCHC 31.9* 31.8*   RDW 58.4* 58.2*   PLATELETCT 248 252   MPV 11.1 10.8     Recent Labs     03/22/24  0116 03/23/24  0604   SODIUM 135 133*   POTASSIUM 4.3 4.5   CHLORIDE 102 97   CO2 19* 21   GLUCOSE 322* 320*   BUN 28* 26*   CREATININE 1.92* 1.81*   CALCIUM 7.9* 8.2*                     Imaging  IR-KERRY PATINO PLACEMENT >5   Final Result      Successful image guided tunneled dialysis catheter placement.      Plan: The catheter is available for immediate use.         EC-ECHOCARDIOGRAM COMPLETE W/O CONT   Final Result      DX-CHEST-PORTABLE (1 VIEW)   Final Result      Small pleural effusions.           Assessment/Plan  * ESRD needing dialysis ([facility])  Assessment & Plan  Likely related to uncontrolled diabetes and hypertension   Came with generalized weakness   Subcutaneous dialysis catheter was placed and started with dialysis  Chronic anemia, check iron panel  Monitor phosphorus and calcium  Nephrology on board, appreciate recommendations.    Primary hypertension- (present on admission)  Assessment & Plan  Uncontrolled hypertension  Resume home medication amlodipine and metoprolol  Hydralazine 25 mg 3 times daily was added on 3/24    Chronic obstructive pulmonary disease- (present on admission)  Assessment & Plan  Resume home medications  DuoNebs as needed    CKD (chronic kidney disease) stage 4, GFR 15-29 ml/min ([facility])- (present on admission)  Assessment & Plan  Nephrology on board      Hyperlipidemia  Assessment & Plan  Lipitor    DM (diabetes mellitus) ([facility])  Assessment &  Plan  Sliding scale  A1c 8.7  Patient taking Lantus 25 units at home, start with Lantus 10 units at the hospital and adjust the dose if needed    ACP (advance care planning)  Assessment & Plan  16 minutes spent discussing goals of care with patient.  She was explained that she has fluid overload likely due to her chronic kidney disease and that she will require dialysis for fluid removal.  She was asked because that is, to which he stated she would like to be DNR/DNI, okay for medical treatment.    MDD (major depressive disorder)  Assessment & Plan  Resume home medications venlafaxine and trazodone  Patient asked to start with Xanax, discussed the risk from the medication and the risk of addiction, patient understood.  Patient is to follow-up with her psychiatrist as outpatient.    Hypothyroidism- (present on admission)  Assessment & Plan  Continue synthroid 250 mcg daily and Cytomel 5 mcg daily TSH 40    Patient is to follow-up closely with PCP to repeat labs and increase dose of levothyroxine         VTE prophylaxis:   SCDs/TEDs   heparin ppx      I have performed a physical exam and reviewed and updated ROS and Plan today (3/24/2024). In review of yesterday's note (3/23/2024), there are no changes except as documented above.      Greater than 51 minutes spent prepping to see patient (e.g. review of tests) obtaining and/or reviewing separately obtained history. Performing a medically appropriate examination and/ evaluation.  Counseling and educating the patient/family/caregiver.  Ordering medications, tests, or procedures.  Referring and communicating with other health care professionals.  Documenting clinical information in EPIC.  Independently interpreting results and communicating results to patient/family/caregiver.  Care coordination

## 2024-03-24 NOTE — DISCHARGE PLANNING
TCN following. HTH/SCP chart reviewed. No new TCN needs identified. Please see prior TCN note from 3/23/24 for most recent discharge planning considerations if indicated.     Completed:  PT with recommendations for post acute placement on 3/21  Choice obtained: none; blanket SNF referrals sent per  blanket referral policy; has 2 accepting as below with 3 additional facilities pending and 2 declinations  [facility]

## 2024-03-24 NOTE — ASSESSMENT & PLAN NOTE
Uncontrolled hypertension  Resume home medication amlodipine and metoprolol  Hydralazine 25 mg 3 times daily was added on 3/24

## 2024-03-24 NOTE — PROGRESS NOTES
Nephrology Daily Progress Note    Date of Service  3/24/2024    Chief Complaint  68 y.o. female with advanced  admitted 3/20/2024 with uremia , volume overloaded, uncontrolled BP initiated RRT    Interval Problem Update  3/24 -doing much better, no N/V, better energy and appetite  No acute events  Still with edema , elevated BP  Scheduled additional treatment -PUF  Seen and examined during PUF procedure -tolerates well  UF goal 2-3 L    Review of Systems  Review of Systems   Constitutional:  Negative for chills, fever, malaise/fatigue and weight loss.   HENT:  Negative for congestion, hearing loss and sinus pain.    Eyes: Negative.    Respiratory:  Negative for cough, hemoptysis, shortness of breath and wheezing.    Cardiovascular:  Positive for leg swelling. Negative for chest pain, palpitations and orthopnea.   Gastrointestinal:  Negative for abdominal pain, nausea and vomiting.   Skin: Negative.    All other systems reviewed and are negative.       Physical Exam  Temp:  [36.4 °C (97.5 °F)-37.2 °C (98.9 °F)] 37.2 °C (98.9 °F)  Pulse:  [75-83] 79  Resp:  [17-18] 17  BP: (134-177)/(72-91) 152/76  SpO2:  [92 %-95 %] 95 %    Physical Exam  Vitals reviewed.   Constitutional:       General: She is not in acute distress.     Appearance: Normal appearance. She is well-developed. She is not diaphoretic.   HENT:      Head: Normocephalic and atraumatic.      Nose: Nose normal.      Mouth/Throat:      Mouth: Mucous membranes are moist.      Pharynx: Oropharynx is clear.   Eyes:      Extraocular Movements: Extraocular movements intact.      Conjunctiva/sclera: Conjunctivae normal.      Pupils: Pupils are equal, round, and reactive to light.   Cardiovascular:      Rate and Rhythm: Normal rate and regular rhythm.      Pulses: Normal pulses.      Heart sounds: Normal heart sounds.   Pulmonary:      Effort: Pulmonary effort is normal. No respiratory distress.      Breath sounds: Normal breath sounds. No wheezing or rales.  "  Abdominal:      General: Bowel sounds are normal. There is no distension.      Palpations: Abdomen is soft. There is no mass.      Tenderness: There is no abdominal tenderness. There is no right CVA tenderness or left CVA tenderness.   Musculoskeletal:      Cervical back: Normal range of motion and neck supple.      Right lower leg: Edema present.      Left lower leg: Edema present.   Skin:     General: Skin is warm and dry.      Coloration: Skin is pale.      Findings: No erythema or rash.   Neurological:      General: No focal deficit present.      Mental Status: She is alert and oriented to person, place, and time.      Cranial Nerves: No cranial nerve deficit.      Coordination: Coordination normal.   Psychiatric:         Mood and Affect: Mood normal.         Behavior: Behavior normal.         Thought Content: Thought content normal.         Judgment: Judgment normal.         Fluids    Intake/Output Summary (Last 24 hours) at 3/24/2024 1428  Last data filed at 3/24/2024 1000  Gross per 24 hour   Intake 480 ml   Output --   Net 480 ml       Laboratory  Recent Labs     03/22/24  0116 03/23/24  0604 03/24/24  0932   WBC 8.1 7.6 7.5   RBC 2.95* 3.13* 2.91*   HEMOGLOBIN 8.4* 8.9* 8.3*   HEMATOCRIT 26.3* 28.0* 25.7*   MCV 89.2 89.5 88.3   MCH 28.5 28.4 28.5   MCHC 31.9* 31.8* 32.3   RDW 58.4* 58.2* 55.1*   PLATELETCT 248 252 238   MPV 11.1 10.8 10.9     Recent Labs     03/22/24  0116 03/23/24  0604 03/24/24  0932   SODIUM 135 133* 132*   POTASSIUM 4.3 4.5 3.6   CHLORIDE 102 97 95*   CO2 19* 21 23   GLUCOSE 322* 320* 360*   BUN 28* 26* 29*   CREATININE 1.92* 1.81* 1.96*   CALCIUM 7.9* 8.2* 7.9*         No results for input(s): \"NTPROBNP\" in the last 72 hours.        Imaging  IR-KERRY PATINO PLACEMENT >5   Final Result      Successful image guided tunneled dialysis catheter placement.      Plan: The catheter is available for immediate use.         EC-ECHOCARDIOGRAM COMPLETE W/O CONT   Final Result    "   DX-CHEST-PORTABLE (1 VIEW)   Final Result      Small pleural effusions.           Assessment/Plan   The patient is a very pleasant 68-year-old female with   advanced chronic kidney disease, admitted to initiate hemodialysis treatment.  1.  End-stage renal disease on HD -tolerates well -on TTS schedule  2.  Electrolytes, well controlled, to monitor.  3.  Anemia, low hemoglobin level stable, to monitor. Iron panel WNL  4.  Metabolic acidosis, corrected  with dialysis.  5.  Volume overloaded, to challenge ultrafiltration with hemodialysis as   Tolerates -seen and examined during PUF -tolerates well  6.  Hypertension, elevated blood pressure, should improve with ultrafiltration   with hemodialysis, added ARB     RECOMMENDATIONS:  HD on TTS  To monitor hemoglobin level, basic metabolic panel,   to adjust all medications to renal doses.  renal diet.  Outpt dialysis chair  Will follow

## 2024-03-25 ENCOUNTER — APPOINTMENT (OUTPATIENT)
Dept: RADIOLOGY | Facility: MEDICAL CENTER | Age: 69
DRG: 673 | End: 2024-03-25
Attending: INTERNAL MEDICINE
Payer: MEDICARE

## 2024-03-25 LAB
GLUCOSE BLD STRIP.AUTO-MCNC: 176 MG/DL (ref 65–99)
GLUCOSE BLD STRIP.AUTO-MCNC: 189 MG/DL (ref 65–99)
GLUCOSE BLD STRIP.AUTO-MCNC: 220 MG/DL (ref 65–99)
GLUCOSE BLD STRIP.AUTO-MCNC: 231 MG/DL (ref 65–99)
HBV CORE AB SERPL QL IA: NONREACTIVE

## 2024-03-25 PROCEDURE — 99233 SBSQ HOSP IP/OBS HIGH 50: CPT | Performed by: INTERNAL MEDICINE

## 2024-03-25 PROCEDURE — 770006 HCHG ROOM/CARE - MED/SURG/GYN SEMI*

## 2024-03-25 PROCEDURE — 700102 HCHG RX REV CODE 250 W/ 637 OVERRIDE(OP)

## 2024-03-25 PROCEDURE — 71045 X-RAY EXAM CHEST 1 VIEW: CPT

## 2024-03-25 PROCEDURE — A9270 NON-COVERED ITEM OR SERVICE: HCPCS

## 2024-03-25 PROCEDURE — 99232 SBSQ HOSP IP/OBS MODERATE 35: CPT | Mod: 25 | Performed by: INTERNAL MEDICINE

## 2024-03-25 PROCEDURE — 700102 HCHG RX REV CODE 250 W/ 637 OVERRIDE(OP): Performed by: INTERNAL MEDICINE

## 2024-03-25 PROCEDURE — 86704 HEP B CORE ANTIBODY TOTAL: CPT

## 2024-03-25 PROCEDURE — 700102 HCHG RX REV CODE 250 W/ 637 OVERRIDE(OP): Performed by: STUDENT IN AN ORGANIZED HEALTH CARE EDUCATION/TRAINING PROGRAM

## 2024-03-25 PROCEDURE — 99497 ADVNCD CARE PLAN 30 MIN: CPT | Performed by: INTERNAL MEDICINE

## 2024-03-25 PROCEDURE — A9270 NON-COVERED ITEM OR SERVICE: HCPCS | Performed by: INTERNAL MEDICINE

## 2024-03-25 PROCEDURE — A9270 NON-COVERED ITEM OR SERVICE: HCPCS | Performed by: STUDENT IN AN ORGANIZED HEALTH CARE EDUCATION/TRAINING PROGRAM

## 2024-03-25 PROCEDURE — 82962 GLUCOSE BLOOD TEST: CPT

## 2024-03-25 PROCEDURE — 36415 COLL VENOUS BLD VENIPUNCTURE: CPT

## 2024-03-25 PROCEDURE — 97165 OT EVAL LOW COMPLEX 30 MIN: CPT

## 2024-03-25 PROCEDURE — 700111 HCHG RX REV CODE 636 W/ 250 OVERRIDE (IP): Performed by: INTERNAL MEDICINE

## 2024-03-25 RX ORDER — POLYETHYLENE GLYCOL 3350 17 G/17G
1 POWDER, FOR SOLUTION ORAL 3 TIMES DAILY
Status: DISCONTINUED | OUTPATIENT
Start: 2024-03-25 | End: 2024-03-26 | Stop reason: HOSPADM

## 2024-03-25 RX ORDER — SODIUM FERRIC GLUCONATE COMPLEX IN SUCROSE 12.5 MG/ML
125 INJECTION INTRAVENOUS DAILY
Qty: 80 ML | Refills: 0 | Status: DISCONTINUED | OUTPATIENT
Start: 2024-03-25 | End: 2024-03-26 | Stop reason: HOSPADM

## 2024-03-25 RX ORDER — LORAZEPAM 0.5 MG/1
0.5 TABLET ORAL EVERY 4 HOURS PRN
Status: DISCONTINUED | OUTPATIENT
Start: 2024-03-25 | End: 2024-03-26 | Stop reason: HOSPADM

## 2024-03-25 RX ORDER — ONDANSETRON 4 MG/1
4 TABLET, ORALLY DISINTEGRATING ORAL EVERY 4 HOURS PRN
Status: DISCONTINUED | OUTPATIENT
Start: 2024-03-25 | End: 2024-03-26 | Stop reason: HOSPADM

## 2024-03-25 RX ORDER — ONDANSETRON 2 MG/ML
4 INJECTION INTRAMUSCULAR; INTRAVENOUS EVERY 4 HOURS PRN
Status: DISCONTINUED | OUTPATIENT
Start: 2024-03-25 | End: 2024-03-25

## 2024-03-25 RX ADMIN — OMEPRAZOLE 40 MG: 20 CAPSULE, DELAYED RELEASE ORAL at 05:29

## 2024-03-25 RX ADMIN — METOPROLOL SUCCINATE 200 MG: 100 TABLET, EXTENDED RELEASE ORAL at 05:28

## 2024-03-25 RX ADMIN — INSULIN LISPRO 2 UNITS: 100 INJECTION, SOLUTION INTRAVENOUS; SUBCUTANEOUS at 21:15

## 2024-03-25 RX ADMIN — POLYETHYLENE GLYCOL 3350 1 PACKET: 17 POWDER, FOR SOLUTION ORAL at 08:18

## 2024-03-25 RX ADMIN — HYDRALAZINE HYDROCHLORIDE 25 MG: 50 TABLET ORAL at 13:55

## 2024-03-25 RX ADMIN — LIOTHYRONINE SODIUM 5 MCG: 5 TABLET ORAL at 05:28

## 2024-03-25 RX ADMIN — OXYCODONE HYDROCHLORIDE 15 MG: 15 TABLET ORAL at 03:43

## 2024-03-25 RX ADMIN — INSULIN LISPRO 1 UNITS: 100 INJECTION, SOLUTION INTRAVENOUS; SUBCUTANEOUS at 09:11

## 2024-03-25 RX ADMIN — AMLODIPINE BESYLATE 5 MG: 5 TABLET ORAL at 05:28

## 2024-03-25 RX ADMIN — INSULIN LISPRO 5 UNITS: 100 INJECTION, SOLUTION INTRAVENOUS; SUBCUTANEOUS at 17:51

## 2024-03-25 RX ADMIN — UMECLIDINIUM BROMIDE AND VILANTEROL TRIFENATATE 1 PUFF: 62.5; 25 POWDER RESPIRATORY (INHALATION) at 05:58

## 2024-03-25 RX ADMIN — LORAZEPAM 0.5 MG: 0.5 TABLET ORAL at 20:29

## 2024-03-25 RX ADMIN — LEVOTHYROXINE SODIUM 250 MCG: 0.12 TABLET ORAL at 05:28

## 2024-03-25 RX ADMIN — INSULIN LISPRO 5 UNITS: 100 INJECTION, SOLUTION INTRAVENOUS; SUBCUTANEOUS at 14:18

## 2024-03-25 RX ADMIN — HYDRALAZINE HYDROCHLORIDE 25 MG: 50 TABLET ORAL at 05:35

## 2024-03-25 RX ADMIN — INSULIN LISPRO 5 UNITS: 100 INJECTION, SOLUTION INTRAVENOUS; SUBCUTANEOUS at 09:10

## 2024-03-25 RX ADMIN — ATORVASTATIN CALCIUM 40 MG: 40 TABLET, FILM COATED ORAL at 20:40

## 2024-03-25 RX ADMIN — INSULIN GLARGINE-YFGN 10 UNITS: 100 INJECTION, SOLUTION SUBCUTANEOUS at 17:49

## 2024-03-25 RX ADMIN — HYDRALAZINE HYDROCHLORIDE 25 MG: 50 TABLET ORAL at 20:40

## 2024-03-25 RX ADMIN — ALBUTEROL SULFATE 2 PUFF: 90 AEROSOL, METERED RESPIRATORY (INHALATION) at 20:29

## 2024-03-25 RX ADMIN — TORSEMIDE 100 MG: 100 TABLET ORAL at 05:28

## 2024-03-25 RX ADMIN — INSULIN LISPRO 2 UNITS: 100 INJECTION, SOLUTION INTRAVENOUS; SUBCUTANEOUS at 17:50

## 2024-03-25 RX ADMIN — ONDANSETRON 4 MG: 4 TABLET, ORALLY DISINTEGRATING ORAL at 08:17

## 2024-03-25 RX ADMIN — INSULIN LISPRO 1 UNITS: 100 INJECTION, SOLUTION INTRAVENOUS; SUBCUTANEOUS at 14:17

## 2024-03-25 RX ADMIN — POLYETHYLENE GLYCOL 3350 1 PACKET: 17 POWDER, FOR SOLUTION ORAL at 14:25

## 2024-03-25 RX ADMIN — NICOTINE 14 MG: 14 PATCH TRANSDERMAL at 05:30

## 2024-03-25 RX ADMIN — VENLAFAXINE HYDROCHLORIDE 150 MG: 75 CAPSULE, EXTENDED RELEASE ORAL at 05:28

## 2024-03-25 RX ADMIN — TRAZODONE HYDROCHLORIDE 150 MG: 150 TABLET ORAL at 20:40

## 2024-03-25 ASSESSMENT — COGNITIVE AND FUNCTIONAL STATUS - GENERAL
DAILY ACTIVITIY SCORE: 24
SUGGESTED CMS G CODE MODIFIER DAILY ACTIVITY: CH

## 2024-03-25 ASSESSMENT — ENCOUNTER SYMPTOMS
DOUBLE VISION: 0
PHOTOPHOBIA: 0
CONSTIPATION: 0
WEIGHT LOSS: 0
SHORTNESS OF BREATH: 0
SPEECH CHANGE: 0
COUGH: 0
BLURRED VISION: 0
ORTHOPNEA: 0
NECK PAIN: 0
HEMOPTYSIS: 0
NAUSEA: 0
DIARRHEA: 0
MYALGIAS: 0
ABDOMINAL PAIN: 0
VOMITING: 0
CHILLS: 0
PALPITATIONS: 0
CLAUDICATION: 0
WEAKNESS: 0
DIZZINESS: 0
FEVER: 0

## 2024-03-25 ASSESSMENT — ACTIVITIES OF DAILY LIVING (ADL): TOILETING: INDEPENDENT

## 2024-03-25 NOTE — CARE PLAN
The patient is Stable - Low risk of patient condition declining or worsening    Shift Goals  Clinical Goals: pt will remain safe throughout shift  Patient Goals: rest  Family Goals: RILEY    Progress made toward(s) clinical / shift goals:  Problem: Mobility  Goal: Patient's capacity to carry out activities will improve  Outcome: Progressing  Problem: Fall Risk  Goal: Patient will remain free from falls  3/25/2024 0406 by Carlos A Tran R.N.  Outcome: Progressing  Pt remains free of falls, able to ambulate without assistance. Educated on use of call light if in need of assistance, verbalizes understanding.  Problem: Psychosocial  Goal: Patient's level of anxiety will decrease  Outcome: Progressing   Pt able to sleep throughout shift without requesting anxiety medication.        Patient is not progressing towards the following goals:

## 2024-03-25 NOTE — PROGRESS NOTES
[facility] Medicine Daily Progress Note    Date of Service  3/25/2024    Chief Complaint  Anu Manuel is a 68 y.o. female admitted 3/20/2024 with weakness    Hospital Course:    68-year-old female with history of diabetes, hypertension chronic kidney disease, hypothyroidism, dyslipidemia and depression who was send to ED on 3/20 to start dialysis.  Patient has chronic kidney disease due to diabetes and being followed by nephrology clinic, patient has had generalized weakness with gaining weight and lower extremity edema.  Evaluated by nephrologist to transfer her to the ED.  On admission blood pressure was not controlled, no fever or chills, patient needed 2 L nasal cannula.  Labs showed chronic anemia with creatinine 2.4, subcutaneous dialysis catheter was placed by IR and started dialysis.  With improving on her symptoms, lower extremity edema and appetite.    Her blood pressure was uncontrolled and medication was adjusted and hydralazine was added.      Interval Problem Update  -Evaluated examined the patient at bedside.   -Vital signs stable, no events last night  -Improving on her appetite and lower extremity edema  -Patient is on room air, PT and OT cleared her for discharge.  -Case was discussed with nephrologist Dr. Najjar, waiting for the confirmation for dialysis chair before discharge.  -Improving, her blood pressure  -Patient is very anxious to be discharged, discussed the importance of getting confirmation for dialysis before leaving, patient understood and agreed.      I have discussed this patient's plan of care and discharge plan at IDT rounds today with Case Management, Nursing, Nursing leadership, and other members of the IDT team.    Consultants/Specialty  Nephrologist and IR    Code Status  DNAR/DNI    Disposition  The patient is not medically cleared for discharge to home or a post-acute facility.  Anticipate discharge to: home with close outpatient follow-up    I have placed the  appropriate orders for post-discharge needs.    Review of Systems  Review of Systems   Constitutional:  Negative for chills, fever, malaise/fatigue and weight loss.   HENT:  Negative for ear pain, hearing loss and tinnitus.    Eyes:  Negative for blurred vision, double vision and photophobia.   Respiratory:  Negative for cough, hemoptysis and shortness of breath.    Cardiovascular:  Negative for chest pain, palpitations, orthopnea and claudication.   Gastrointestinal:  Negative for abdominal pain, constipation, diarrhea, nausea and vomiting.   Genitourinary:  Negative for dysuria, frequency and urgency.   Musculoskeletal:  Negative for myalgias and neck pain.   Skin:  Negative for rash.   Neurological:  Negative for dizziness, speech change and weakness.        Physical Exam  Temp:  [36.4 °C (97.5 °F)-36.7 °C (98 °F)] 36.5 °C (97.7 °F)  Pulse:  [65-78] 73  Resp:  [18-19] 18  BP: (129-147)/() 134/102  SpO2:  [91 %-97 %] 95 %    Physical Exam  Constitutional:       General: She is not in acute distress.     Appearance: She is not ill-appearing.   HENT:      Head: Normocephalic and atraumatic.   Eyes:      General: No scleral icterus.  Cardiovascular:      Rate and Rhythm: Normal rate.      Heart sounds: No murmur heard.  Pulmonary:      Effort: No respiratory distress.      Breath sounds: Rales present. No wheezing.   Abdominal:      General: There is no distension.      Tenderness: There is no abdominal tenderness. There is no guarding.   Musculoskeletal:      Right lower leg: Edema present.      Left lower leg: Edema present.   Skin:     Coloration: Skin is not jaundiced or pale.      Findings: No bruising or lesion.   Neurological:      General: No focal deficit present.      Mental Status: She is oriented to person, place, and time. Mental status is at baseline.      Cranial Nerves: No cranial nerve deficit.      Motor: No weakness.         Fluids    Intake/Output Summary (Last 24 hours) at 3/25/2024  1522  Last data filed at 3/25/2024 0900  Gross per 24 hour   Intake 520 ml   Output --   Net 520 ml       Laboratory  Recent Labs     03/23/24  0604 03/24/24  0932   WBC 7.6 7.5   RBC 3.13* 2.91*   HEMOGLOBIN 8.9* 8.3*   HEMATOCRIT 28.0* 25.7*   MCV 89.5 88.3   MCH 28.4 28.5   MCHC 31.8* 32.3   RDW 58.2* 55.1*   PLATELETCT 252 238   MPV 10.8 10.9     Recent Labs     03/23/24  0604 03/24/24  0932   SODIUM 133* 132*   POTASSIUM 4.5 3.6   CHLORIDE 97 95*   CO2 21 23   GLUCOSE 320* 360*   BUN 26* 29*   CREATININE 1.81* 1.96*   CALCIUM 8.2* 7.9*                     Imaging  DX-CHEST-PORTABLE (1 VIEW)   Final Result      1. No radiographic evidence of active pulmonary tuberculosis.   2. Appropriate position of the right internal jugular dialysis catheter.   3. Resolution of the small bilateral pleural effusions.   4. The remainder is stable.      DX-CHEST-LIMITED (1 VIEW)   Final Result         No acute cardiac or pulmonary abnormality is identified.      IR-PATINO,GROSHONG PLACEMENT >5   Final Result      Successful image guided tunneled dialysis catheter placement.      Plan: The catheter is available for immediate use.         EC-ECHOCARDIOGRAM COMPLETE W/O CONT   Final Result      DX-CHEST-PORTABLE (1 VIEW)   Final Result      Small pleural effusions.           Assessment/Plan  * ESRD needing dialysis ([facility])  Assessment & Plan  Likely related to uncontrolled diabetes and hypertension   Came with generalized weakness   Subcutaneous dialysis catheter was placed and started with dialysis  Chronic anemia, check iron panel  Monitor phosphorus and calcium  Nephrology on board, appreciate recommendations.    Primary hypertension- (present on admission)  Assessment & Plan  Uncontrolled hypertension  Resume home medication amlodipine and metoprolol  Hydralazine 25 mg 3 times daily was added on 3/24    Chronic obstructive pulmonary disease- (present on admission)  Assessment & Plan  Resume home medications  DuoNebs as  needed    CKD (chronic kidney disease) stage 4, GFR 15-29 ml/min ([facility])- (present on admission)  Assessment & Plan  Nephrology on board      Hyperlipidemia  Assessment & Plan  Lipitor    DM (diabetes mellitus) ([facility])  Assessment & Plan  Sliding scale  A1c 8.7  Patient taking Lantus 25 units at home, start with Lantus 10 units at the hospital and adjust the dose if needed    ACP (advance care planning)  Assessment & Plan  16 minutes spent discussing goals of care with patient.  She was explained that she has fluid overload likely due to her chronic kidney disease and that she will require dialysis for fluid removal.  She was asked because that is, to which he stated she would like to be DNR/DNI, okay for medical treatment.    MDD (major depressive disorder)  Assessment & Plan  Resume home medications venlafaxine and trazodone  Patient asked to start with Xanax, discussed the risk from the medication and the risk of addiction, patient understood.  Patient is to follow-up with her psychiatrist as outpatient.    Hypothyroidism- (present on admission)  Assessment & Plan  Continue synthroid 250 mcg daily and Cytomel 5 mcg daily TSH 40    Patient is to follow-up closely with PCP to repeat labs and increase dose of levothyroxine         VTE prophylaxis:   SCDs/TEDs   heparin ppx      I have performed a physical exam and reviewed and updated ROS and Plan today (3/25/2024). In review of yesterday's note (3/24/2024), there are no changes except as documented above.

## 2024-03-25 NOTE — PROGRESS NOTES
Nephrology Daily Progress Note    Date of Service  3/25/2024    Chief Complaint  68 y.o. female with advanced  admitted 3/20/2024 with uremia , volume overloaded, uncontrolled BP initiated RRT    Interval Problem Update  3/24 -doing much better, no N/V, better energy and appetite  No acute events  Still with edema , elevated BP  Scheduled additional treatment -PUF  Seen and examined during PUF procedure -tolerates well  UF goal 2-3 L  3/25 patient is feeling much better after starting dialysis  She is anxious to go home  Review of Systems  Review of Systems   Constitutional:  Negative for chills, fever and malaise/fatigue.   Respiratory:  Negative for cough and shortness of breath.    Cardiovascular:  Negative for chest pain and leg swelling.   Gastrointestinal:  Negative for nausea and vomiting.   Genitourinary:  Negative for dysuria, frequency and urgency.        Physical Exam  Temp:  [36.4 °C (97.5 °F)-36.7 °C (98 °F)] 36.5 °C (97.7 °F)  Pulse:  [65-78] 73  Resp:  [18-19] 18  BP: (129-147)/() 134/102  SpO2:  [91 %-97 %] 95 %    Physical Exam  Vitals and nursing note reviewed.   Constitutional:       General: She is not in acute distress.     Appearance: Normal appearance. She is well-developed. She is not diaphoretic.   HENT:      Head: Normocephalic and atraumatic.      Right Ear: External ear normal.      Left Ear: External ear normal.      Nose: Nose normal.   Eyes:      General: No scleral icterus.        Right eye: No discharge.         Left eye: No discharge.      Conjunctiva/sclera: Conjunctivae normal.   Cardiovascular:      Rate and Rhythm: Normal rate and regular rhythm.      Heart sounds: No murmur heard.  Pulmonary:      Effort: Pulmonary effort is normal. No respiratory distress.      Breath sounds: Normal breath sounds.   Musculoskeletal:         General: No tenderness.      Right lower leg: Edema present.      Left lower leg: Edema present.   Skin:     General: Skin is warm and dry.       "Findings: No erythema.   Neurological:      General: No focal deficit present.      Mental Status: She is alert and oriented to person, place, and time.      Cranial Nerves: No cranial nerve deficit.   Psychiatric:         Mood and Affect: Mood normal.         Behavior: Behavior normal.         Fluids    Intake/Output Summary (Last 24 hours) at 3/25/2024 1418  Last data filed at 3/25/2024 0900  Gross per 24 hour   Intake 520 ml   Output --   Net 520 ml       Laboratory  Recent Labs     03/23/24  0604 03/24/24  0932   WBC 7.6 7.5   RBC 3.13* 2.91*   HEMOGLOBIN 8.9* 8.3*   HEMATOCRIT 28.0* 25.7*   MCV 89.5 88.3   MCH 28.4 28.5   MCHC 31.8* 32.3   RDW 58.2* 55.1*   PLATELETCT 252 238   MPV 10.8 10.9     Recent Labs     03/23/24  0604 03/24/24  0932   SODIUM 133* 132*   POTASSIUM 4.5 3.6   CHLORIDE 97 95*   CO2 21 23   GLUCOSE 320* 360*   BUN 26* 29*   CREATININE 1.81* 1.96*   CALCIUM 8.2* 7.9*         No results for input(s): \"NTPROBNP\" in the last 72 hours.        Imaging  IR-KERRY PATINO PLACEMENT >5   Final Result      Successful image guided tunneled dialysis catheter placement.      Plan: The catheter is available for immediate use.         EC-ECHOCARDIOGRAM COMPLETE W/O CONT   Final Result      DX-CHEST-PORTABLE (1 VIEW)   Final Result      Small pleural effusions.      DX-CHEST-LIMITED (1 VIEW)    (Results Pending)   DX-CHEST-PORTABLE (1 VIEW)    (Results Pending)        Assessment/Plan   The patient is a very pleasant 68-year-old female with   advanced chronic kidney disease, admitted to initiate hemodialysis treatment.  1.  End-stage renal disease.  2.  Electrolytes, well controlled, to monitor.  3.  Anemia.  4.  Metabolic acidosis, corrected  with dialysis.  5.  Volume overloaded  6.  Hypertension  no acute need for HD today  Awaiting outpatient dialysis chair arrangement  Erythropoietin with dialysis  IV iron loading dose  Optimize thyroid disease management  Check PTH  Renal diet  Daily BMP, CBC.  Renal " dose all meds  Avoid nephrotoxins like NSAIDs.  Plan discussed with Dr. Fernandes

## 2024-03-25 NOTE — PROGRESS NOTES
[facility] Progress Note       PUF today x 2 hours per Dr. Anderson.  Tx initiated at 1150 and ended at 1351    NET UF: 3000 ml    Tx tolerated, UFG met. No HD complications noted. Blood returned, ports flushed with NS. Heparin 1000 units/ml used to lock catheter per designated amount. CVC ports clamped and capped. Aspirate heparin prior to next CVC use. See eflow sheets for further details.    Report given to SCOT Tran RN

## 2024-03-25 NOTE — PROGRESS NOTES
Pt experienced 3 rounds emesis during 0600 med pass. Unable to visualize pills in emesis. Pt declines need for PRN nausea medication. All other needs met at this time.

## 2024-03-25 NOTE — DISCHARGE PLANNING
[facility] TCN chart review completed. Collaborated with BOBBY Chaidez.  Current discharge considerations are  for post acute placement given initial PT recommendations vs. Home.  Note that blanket referral previously per  policy.  Per chart review, patient has two accepting facilities including [facility] [place] [facility] and [facility].  [facility] and [facility] are pending.  [facility] and [facility] have declined.      As noted per prior TCN note, pt in agreement with post acute placement and will select from accepting facilities once HD chair is available.  [facility] authorization obtained for SNF.  CM notified.      OT anticipates no needs on 3/25/24.TCN will continue to follow and collaborate with discharge planning team as additional post acute needs arise. Thank you.    Completed:  PT with recommendations for post acute placement on 3/21  OT Anticipate that the patient will have no further occupational therapy needs on 3/25/24 after discharge from the hospital.    Choice obtained: none; blanket SNF referrals sent per  blanket referral policy; has 2 accepting as below with 3 additional facilities pending and 2 declinations  [facility]

## 2024-03-25 NOTE — THERAPY
"Occupational Therapy   Initial Evaluation     Patient Name: Anu Manuel  Age:  68 y.o., Sex:  female  Medical Record #: [id number]  Today's Date: 3/25/2024     Precautions  Precautions: (P) Fall Risk  Comments: R sided weakness    Assessment  Patient is a 68 y.o. female with a diagnosis of fluid overload in the setting of ESRD needing dialysis. Additional factors influencing patient status / progress: PMHx includes CKD stage IV, hypertension, diabetes, major depressive disorder, COPD. During OT eval, pt demo'd ADLs and related functional mobility/transfers with overall SPV-modified independence. Reports she is feeling much better today with noticeable fluid loss. Has good intermittent support from her friend and brother for IADLs. Pt has no further acute OT needs at this time.       Plan    Occupational Therapy Initial Treatment Plan   Duration: (P) Evaluation only    DC Equipment Recommendations: (P) None  Discharge Recommendations: (P) Anticipate that the patient will have no further occupational therapy needs after discharge from the hospital     Subjective    \"I feel a lot better.\"     Objective     03/25/24 0953   Initial Contact Note    Initial Contact Note Order Received and Verified, Evaluation Only - Patient Does Not Require Further Acute Occupational Therapy at this Time.  However, May Benefit from Post Acute Therapy for Higher Level Functional Deficits.   Prior Living Situation   Prior Services Home-Independent   Housing / Facility 1 Story House   Steps Into Home 2  (or 3 through the back)   Steps In Home 0   Bathroom Set up Walk In Shower;Bathtub / Shower Combination;Shower Chair   Equipment Owned 4-Wheel Walker;Single Point Cane;Tub / Shower Seat;Hand Held Shower   Lives with - Patient's Self Care Capacity Alone and Able to Care For Self   Comments Pt reports her brother and friend can provide intermittent assistance for IADLs   Prior Level of ADL Function   Self Feeding Independent   Grooming " / Hygiene Independent   Bathing Independent   Dressing Independent   Toileting Independent   Prior Level of IADL Function   Medication Management Independent   Laundry Independent   Kitchen Mobility Independent   Finances Independent   Home Management Independent   Shopping Independent   Prior Level Of Mobility Independent Without Device in Community   Driving / Transportation Driving Independent   Occupation (Pre-Hospital Vocational) Retired Due To Age   Leisure Interests Pets   Precautions   Precautions Fall Risk   Pain   Intervention Declines   Non Verbal Descriptors   Non Verbal Scale  Calm   Cognition    Level of Consciousness Alert   Comments Pleasant and cooperative   Active ROM Upper Body   Active ROM Upper Body  WDL   Strength Upper Body   Upper Body Strength  WDL   Sensation Upper Body   Upper Extremity Sensation  Not Tested   Upper Body Muscle Tone   Upper Body Muscle Tone  WDL   Neurological Concerns   Neurological Concerns No   Coordination Upper Body   Coordination WDL   Balance Assessment   Sitting Balance (Static) Good   Sitting Balance (Dynamic) Good   Standing Balance (Static) Good   Standing Balance (Dynamic) Fair +   Weight Shift Sitting Good   Weight Shift Standing Good   Comments no AD   Bed Mobility    Supine to Sit Modified Independent   Sit to Supine Modified Independent   Scooting Modified Independent   Comments HOB elevated, reports she has an adjustable bed at home   ADL Assessment   Grooming Supervision;Standing   Upper Body Dressing Independent  (gown)   Lower Body Dressing   (demo's seated figure four to manage distal LBD)   Toileting   (declined need)   How much help from another person does the patient currently need...   Putting on and taking off regular lower body clothing? 4   Bathing (including washing, rinsing, and drying)? 4   Toileting, which includes using a toilet, bedpan, or urinal? 4   Putting on and taking off regular upper body clothing? 4   Taking care of personal  grooming such as brushing teeth? 4   Eating meals? 4   6 Clicks Daily Activity Score 24   Functional Mobility   Sit to Stand Supervised   Toilet Transfers Supervised   Transfer Method Stand Step   Mobility EOB->bathroom->sink->EOB   Activity Tolerance   Sitting Edge of Bed 2 min   Standing 5 min   Education Group   Role of Occupational Therapist Patient Response Patient;Acceptance;Explanation;Verbal Demonstration   Occupational Therapy Initial Treatment Plan    Duration Evaluation only   Problem List   Problem List Decreased Activity Tolerance   Anticipated Discharge Equipment and Recommendations   DC Equipment Recommendations None   Discharge Recommendations Anticipate that the patient will have no further occupational therapy needs after discharge from the hospital   Interdisciplinary Plan of Care Collaboration   IDT Collaboration with  Nursing   Patient Position at End of Therapy In Bed;Call Light within Reach;Tray Table within Reach;Phone within Reach   Collaboration Comments RN updated   Session Information   Date / Session Number  3/25 eval only

## 2024-03-26 ENCOUNTER — PATIENT OUTREACH (OUTPATIENT)
Dept: SCHEDULING | Facility: IMAGING CENTER | Age: 69
End: 2024-03-26
Payer: MEDICARE

## 2024-03-26 ENCOUNTER — PHARMACY VISIT (OUTPATIENT)
Dept: PHARMACY | Facility: MEDICAL CENTER | Age: 69
End: 2024-03-26
Payer: COMMERCIAL

## 2024-03-26 VITALS
HEART RATE: 73 BPM | TEMPERATURE: 97.6 F | HEIGHT: 66 IN | WEIGHT: 162.7 LBS | BODY MASS INDEX: 26.15 KG/M2 | SYSTOLIC BLOOD PRESSURE: 142 MMHG | RESPIRATION RATE: 18 BRPM | DIASTOLIC BLOOD PRESSURE: 70 MMHG | OXYGEN SATURATION: 95 %

## 2024-03-26 LAB
ALBUMIN SERPL BCP-MCNC: 3 G/DL (ref 3.2–4.9)
ALBUMIN/GLOB SERPL: 0.6 G/DL
ALP SERPL-CCNC: 1039 U/L (ref 30–99)
ALT SERPL-CCNC: 39 U/L (ref 2–50)
ANION GAP SERPL CALC-SCNC: 14 MMOL/L (ref 7–16)
AST SERPL-CCNC: 67 U/L (ref 12–45)
BASOPHILS # BLD AUTO: 0.9 % (ref 0–1.8)
BASOPHILS # BLD: 0.08 K/UL (ref 0–0.12)
BILIRUB SERPL-MCNC: 0.4 MG/DL (ref 0.1–1.5)
BUN SERPL-MCNC: 42 MG/DL (ref 8–22)
CALCIUM ALBUM COR SERPL-MCNC: 8.9 MG/DL (ref 8.5–10.5)
CALCIUM SERPL-MCNC: 8.1 MG/DL (ref 8.5–10.5)
CHLORIDE SERPL-SCNC: 99 MMOL/L (ref 96–112)
CO2 SERPL-SCNC: 24 MMOL/L (ref 20–33)
CREAT SERPL-MCNC: 2.67 MG/DL (ref 0.5–1.4)
EOSINOPHIL # BLD AUTO: 0.29 K/UL (ref 0–0.51)
EOSINOPHIL NFR BLD: 3.1 % (ref 0–6.9)
ERYTHROCYTE [DISTWIDTH] IN BLOOD BY AUTOMATED COUNT: 54.7 FL (ref 35.9–50)
GFR SERPLBLD CREATININE-BSD FMLA CKD-EPI: 19 ML/MIN/1.73 M 2
GLOBULIN SER CALC-MCNC: 4.8 G/DL (ref 1.9–3.5)
GLUCOSE BLD STRIP.AUTO-MCNC: 224 MG/DL (ref 65–99)
GLUCOSE SERPL-MCNC: 171 MG/DL (ref 65–99)
HCT VFR BLD AUTO: 27.2 % (ref 37–47)
HGB BLD-MCNC: 8.8 G/DL (ref 12–16)
IMM GRANULOCYTES # BLD AUTO: 0.03 K/UL (ref 0–0.11)
IMM GRANULOCYTES NFR BLD AUTO: 0.3 % (ref 0–0.9)
LYMPHOCYTES # BLD AUTO: 1.18 K/UL (ref 1–4.8)
LYMPHOCYTES NFR BLD: 12.8 % (ref 22–41)
MAGNESIUM SERPL-MCNC: 1.9 MG/DL (ref 1.5–2.5)
MCH RBC QN AUTO: 28.8 PG (ref 27–33)
MCHC RBC AUTO-ENTMCNC: 32.4 G/DL (ref 32.2–35.5)
MCV RBC AUTO: 88.9 FL (ref 81.4–97.8)
MONOCYTES # BLD AUTO: 1.02 K/UL (ref 0–0.85)
MONOCYTES NFR BLD AUTO: 11.1 % (ref 0–13.4)
NEUTROPHILS # BLD AUTO: 6.62 K/UL (ref 1.82–7.42)
NEUTROPHILS NFR BLD: 71.8 % (ref 44–72)
NRBC # BLD AUTO: 0 K/UL
NRBC BLD-RTO: 0 /100 WBC (ref 0–0.2)
PHOSPHATE SERPL-MCNC: 4.6 MG/DL (ref 2.5–4.5)
PLATELET # BLD AUTO: 263 K/UL (ref 164–446)
PMV BLD AUTO: 10.5 FL (ref 9–12.9)
POTASSIUM SERPL-SCNC: 4 MMOL/L (ref 3.6–5.5)
PROT SERPL-MCNC: 7.8 G/DL (ref 6–8.2)
PTH-INTACT SERPL-MCNC: 106 PG/ML (ref 14–72)
RBC # BLD AUTO: 3.06 M/UL (ref 4.2–5.4)
SODIUM SERPL-SCNC: 137 MMOL/L (ref 135–145)
WBC # BLD AUTO: 9.2 K/UL (ref 4.8–10.8)

## 2024-03-26 PROCEDURE — 83735 ASSAY OF MAGNESIUM: CPT

## 2024-03-26 PROCEDURE — A9270 NON-COVERED ITEM OR SERVICE: HCPCS | Performed by: STUDENT IN AN ORGANIZED HEALTH CARE EDUCATION/TRAINING PROGRAM

## 2024-03-26 PROCEDURE — 85025 COMPLETE CBC W/AUTO DIFF WBC: CPT

## 2024-03-26 PROCEDURE — 84100 ASSAY OF PHOSPHORUS: CPT

## 2024-03-26 PROCEDURE — 700102 HCHG RX REV CODE 250 W/ 637 OVERRIDE(OP)

## 2024-03-26 PROCEDURE — 700102 HCHG RX REV CODE 250 W/ 637 OVERRIDE(OP): Performed by: INTERNAL MEDICINE

## 2024-03-26 PROCEDURE — 700102 HCHG RX REV CODE 250 W/ 637 OVERRIDE(OP): Performed by: STUDENT IN AN ORGANIZED HEALTH CARE EDUCATION/TRAINING PROGRAM

## 2024-03-26 PROCEDURE — A9270 NON-COVERED ITEM OR SERVICE: HCPCS

## 2024-03-26 PROCEDURE — 80053 COMPREHEN METABOLIC PANEL: CPT

## 2024-03-26 PROCEDURE — 36415 COLL VENOUS BLD VENIPUNCTURE: CPT

## 2024-03-26 PROCEDURE — A9270 NON-COVERED ITEM OR SERVICE: HCPCS | Performed by: INTERNAL MEDICINE

## 2024-03-26 PROCEDURE — 99239 HOSP IP/OBS DSCHRG MGMT >30: CPT | Performed by: INTERNAL MEDICINE

## 2024-03-26 PROCEDURE — 700111 HCHG RX REV CODE 636 W/ 250 OVERRIDE (IP)

## 2024-03-26 PROCEDURE — 82962 GLUCOSE BLOOD TEST: CPT

## 2024-03-26 PROCEDURE — RXMED WILLOW AMBULATORY MEDICATION CHARGE: Performed by: INTERNAL MEDICINE

## 2024-03-26 PROCEDURE — 90935 HEMODIALYSIS ONE EVALUATION: CPT

## 2024-03-26 PROCEDURE — 83970 ASSAY OF PARATHORMONE: CPT

## 2024-03-26 PROCEDURE — 90935 HEMODIALYSIS ONE EVALUATION: CPT | Performed by: INTERNAL MEDICINE

## 2024-03-26 PROCEDURE — 97116 GAIT TRAINING THERAPY: CPT

## 2024-03-26 RX ORDER — HEPARIN SODIUM 1000 [USP'U]/ML
INJECTION, SOLUTION INTRAVENOUS; SUBCUTANEOUS
Status: COMPLETED
Start: 2024-03-26 | End: 2024-03-26

## 2024-03-26 RX ORDER — TORSEMIDE 100 MG/1
100 TABLET ORAL DAILY
Qty: 30 TABLET | Refills: 3 | Status: SHIPPED | OUTPATIENT
Start: 2024-03-27

## 2024-03-26 RX ORDER — HYDRALAZINE HYDROCHLORIDE 25 MG/1
25 TABLET, FILM COATED ORAL EVERY 8 HOURS
Qty: 90 TABLET | Refills: 3 | Status: SHIPPED | OUTPATIENT
Start: 2024-03-26

## 2024-03-26 RX ADMIN — INSULIN LISPRO 2 UNITS: 100 INJECTION, SOLUTION INTRAVENOUS; SUBCUTANEOUS at 11:43

## 2024-03-26 RX ADMIN — HYDRALAZINE HYDROCHLORIDE 25 MG: 50 TABLET ORAL at 05:17

## 2024-03-26 RX ADMIN — LIOTHYRONINE SODIUM 5 MCG: 5 TABLET ORAL at 05:16

## 2024-03-26 RX ADMIN — AMLODIPINE BESYLATE 5 MG: 5 TABLET ORAL at 05:17

## 2024-03-26 RX ADMIN — NICOTINE 14 MG: 14 PATCH TRANSDERMAL at 05:17

## 2024-03-26 RX ADMIN — OMEPRAZOLE 40 MG: 20 CAPSULE, DELAYED RELEASE ORAL at 05:16

## 2024-03-26 RX ADMIN — METOPROLOL SUCCINATE 200 MG: 100 TABLET, EXTENDED RELEASE ORAL at 05:16

## 2024-03-26 RX ADMIN — VENLAFAXINE HYDROCHLORIDE 150 MG: 75 CAPSULE, EXTENDED RELEASE ORAL at 05:17

## 2024-03-26 RX ADMIN — HEPARIN SODIUM 3600 UNITS: 1000 INJECTION, SOLUTION INTRAVENOUS; SUBCUTANEOUS at 10:38

## 2024-03-26 RX ADMIN — INSULIN LISPRO 5 UNITS: 100 INJECTION, SOLUTION INTRAVENOUS; SUBCUTANEOUS at 11:44

## 2024-03-26 RX ADMIN — UMECLIDINIUM BROMIDE AND VILANTEROL TRIFENATATE 1 PUFF: 62.5; 25 POWDER RESPIRATORY (INHALATION) at 05:18

## 2024-03-26 RX ADMIN — TORSEMIDE 100 MG: 100 TABLET ORAL at 05:18

## 2024-03-26 RX ADMIN — LEVOTHYROXINE SODIUM 250 MCG: 0.12 TABLET ORAL at 05:16

## 2024-03-26 ASSESSMENT — COGNITIVE AND FUNCTIONAL STATUS - GENERAL
MOBILITY SCORE: 18
TURNING FROM BACK TO SIDE WHILE IN FLAT BAD: A LITTLE
MOVING TO AND FROM BED TO CHAIR: A LITTLE
SUGGESTED CMS G CODE MODIFIER MOBILITY: CK
WALKING IN HOSPITAL ROOM: A LITTLE
CLIMB 3 TO 5 STEPS WITH RAILING: A LITTLE
MOVING FROM LYING ON BACK TO SITTING ON SIDE OF FLAT BED: A LITTLE
STANDING UP FROM CHAIR USING ARMS: A LITTLE

## 2024-03-26 ASSESSMENT — GAIT ASSESSMENTS
DISTANCE (FEET): 200
GAIT LEVEL OF ASSIST: SUPERVISED

## 2024-03-26 NOTE — CARE PLAN
The patient is Stable - Low risk of patient condition declining or worsening    Shift Goals  Clinical Goals: pt will remain safe throughout shift  Patient Goals: rest  Family Goals: RILEY    Progress made toward(s) clinical / shift goals:    Problem: Knowledge Deficit - Standard  Goal: Patient and family/care givers will demonstrate understanding of plan of care, disease process/condition, diagnostic tests and medications  Outcome: Progressing       Patient is not progressing towards the following goals: patient very anxious about her kidney failure diagnosis and being on dialysis. She states that she feels like she is on the verge of a panic attack. Patient very tearful, pacing around room. RN provided emotional support to patient- DANNY vargas, one time dose oral ativan ordered. Vital signs stable

## 2024-03-26 NOTE — DISCHARGE PLANNING
Outpatient Dialysis Note    Per Dr. Anderson, Medical Director patient was accepted to [facility] with condition that a second Quantiferon would be provided as pts 1st resulted equivocal. Patient discharged before Quantiferon could be drawn.     Confirmed at:    [facility]  [address] [place], [place] [address] [address]    Ph: [phone]    Schedule: Tues, Thurs, Sat   Time: 5:45 AM    Patient to start Thurs, 3/28 at 5:15 AM     Patient discharged before this DC could provide dialysis confirmation details.     Placed phone call to [facility] to relay.  They will communicate with patient directly.       Xitlaly Reyes   Dialysis Coordinator / [facility]   Ph: [phone]

## 2024-03-26 NOTE — DISCHARGE SUMMARY
Discharge Summary    CHIEF COMPLAINT ON ADMISSION  Chief Complaint   Patient presents with    Sent by MD     Pt sent by MD to start dialysis. Hx of stage 4 kidney disease. + 40 lb weight gain.    Shortness of Breath       Reason for Admission  Overall volume due to end-stage kidney disease and initiate dialysis    Admission Date  3/20/2024    CODE STATUS  DNAR/DNI    HPI & HOSPITAL COURSE    68-year-old female with history of diabetes, hypertension chronic kidney disease, hypothyroidism, dyslipidemia and depression who was send to ED on 3/20 to start dialysis.  Patient has chronic kidney disease due to diabetes and being followed by nephrology clinic, patient has had generalized weakness with gaining weight and lower extremity edema.  Evaluated by nephrologist to transfer her to the ED.  On admission blood pressure was not controlled, no fever or chills, patient needed 2 L nasal cannula.  Labs showed chronic anemia with creatinine 2.4, subcutaneous dialysis catheter was placed by IR and started dialysis.  With improving on her symptoms, lower extremity edema and appetite.     Her blood pressure was uncontrolled and medication was adjusted and hydralazine was added.  Encouraged the patient to check her blood pressure and blood sugar at home closely and follow-up with PCP to adjust the medication if needed.    Consider discharge the patient was alert oriented x 4, patient is moving around and no need, patient received approval for dialysis chair, her labs around baseline to follow-up closely with her nephrologist, cardiologist and PCP.      Therefore, she is discharged in good and stable condition to home with close outpatient follow-up.    The patient met 2-midnight criteria for an inpatient stay at the time of discharge.    Discharge Date  03/26/24      FOLLOW UP ITEMS POST DISCHARGE  Follow-up with nephrology for dialysis  Follow-up with PCP and nephrology for hypertension and diabetes    DISCHARGE  DIAGNOSES  Principal Problem:    ESRD needing dialysis (HCC) (POA: Unknown)  Active Problems:    Primary hypertension (POA: Yes)    ACP (advance care planning) (POA: Unknown)    DM (diabetes mellitus) (HCC) (POA: Unknown)      Overview: ICD-10 transition    Hyperlipidemia (POA: Unknown)    CKD (chronic kidney disease) stage 4, GFR 15-29 ml/min (HCC) (POA: Yes)    Chronic obstructive pulmonary disease (POA: Yes)    Hypothyroidism (POA: Yes)      Overview:                 MDD (major depressive disorder) (POA: Unknown)  Resolved Problems:    * No resolved hospital problems. *      FOLLOW UP  Future Appointments   Date Time Provider Department Center   4/11/2024  3:40 PM DIPESH Cm M.D.  [address]  [address]  [place] [address]  [phone]    Go on 4/1/2024  Please go to your hospital follow up with Jarrell Yates M.D. on Monday, April 1st, 2024 at 1:45pm, check in at 1:30pm.    Jarrell Yates M.D.  [address]  [address]  [place] [address]  [phone]    Follow up in 1 week(s)      Jarrell Yates M.D.  [address]  [address]  [place] [address]  [phone]          Fadi Najjar, M.D.  [id number]  [address]  [place] [address]  [phone]    Follow up in 1 week(s)        MEDICATIONS ON DISCHARGE     Medication List        START taking these medications        Instructions   hydrALAZINE 25 MG Tabs  Commonly known as: Apresoline   Take 1 Tablet by mouth every 8 hours.  Dose: 25 mg     torsemide 100 MG Tabs  Start taking on: March 27, 2024  Commonly known as: Demadex   Take 1 Tablet by mouth every day.  Dose: 100 mg            CHANGE how you take these medications        Instructions   famotidine 20 MG Tabs  What changed: when to take this  Commonly known as: Pepcid   TAKE 1 TABLET BY MOUTH TWICE A DAY            CONTINUE taking these medications        Instructions   albuterol 108 (90 Base) MCG/ACT Aers inhalation aerosol   Inhale 2 Puffs every 6 hours as  needed for Shortness of Breath.  Dose: 2 Puff     amLODIPine 5 MG Tabs  Commonly known as: Norvasc   Take 5 mg by mouth every day.  Dose: 5 mg     Anoro Ellipta 62.5-25 MCG/ACT Aepb inhaler  Generic drug: umeclidinium-vilanterol   Inhale 1 Puff every day.  Dose: 1 Puff     atorvastatin 40 MG Tabs  Commonly known as: Lipitor   Take 1 Tablet by mouth every evening.  Dose: 40 mg     fenofibrate 48 MG Tabs  Commonly known as: Tricor   Take 48 mg by mouth every morning.  Dose: 48 mg     Lantus SoloStar 100 UNIT/ML Sopn injection  Generic drug: insulin glargine   Inject 5 Units under the skin every evening.  Dose: 5 Units     liothyronine 5 MCG Tabs  Commonly known as: Cytomel   Take 1 Tablet by mouth every day.  Dose: 5 mcg     metoprolol SR 50 MG Tb24  Commonly known as: Toprol XL   Take 200 mg by mouth every day. 4 tablets=200mg  Dose: 200 mg     nicotine 21 MG/24HR Pt24  Commonly known as: Nicoderm   Place 1 Patch on the skin every 24 hours for 30 days.  Dose: 1 Patch     omeprazole 40 MG delayed-release capsule  Commonly known as: PriLOSEC   Take 40 mg by mouth every day.  Dose: 40 mg     ondansetron 8 MG Tbdp  Commonly known as: Zofran ODT   Take 8 mg by mouth every 8 hours as needed for Nausea.  Dose: 8 mg     oxycodone 15 MG immediate release tablet  Commonly known as: Oxy-IR   Take 15 mg by mouth every four hours as needed for Severe Pain.  Dose: 15 mg     Senokot S 8.6-50 MG Tabs  Generic drug: senna-docusate   Take 1 Tablet by mouth every day.  Dose: 1 Tablet     Synthroid 125 MCG Tabs  Generic drug: levothyroxine   Take 250 mcg by mouth every morning on an empty stomach. 2 tablets=250mg DAW1  Dose: 250 mcg     traZODone 150 MG Tabs  Commonly known as: Desyrel   Take 150 mg by mouth at bedtime.  Dose: 150 mg     ursodiol 300 MG Caps  Commonly known as: Actigall   Take 300-600 mg by mouth 2 times a day. 1 capsule qam & 2 caps qpm  Dose: 300-600 mg     venlafaxine 150 MG extended-release capsule  Commonly known  as: Effexor-XR   Take 150 mg by mouth every day.  Dose: 150 mg     VITAMIN B-12 PO   Take 1 Tablet by mouth every day.  Dose: 1 Tablet     VITAMIN D (ERGOCALCIFEROL) PO   Take 1 Tablet by mouth 2 times a day.  Dose: 1 Tablet            STOP taking these medications      furosemide 40 MG Tabs  Commonly known as: Lasix     potassium chloride 10 MEQ capsule  Commonly known as: Micro-K     riFAMPin 300 MG Caps  Commonly known as: Rifadine              Allergies  Allergies   Allergen Reactions    Tape Unspecified and Rash     Blisters, paper tape is ok  Other reaction(s): Rash       DIET  Orders Placed This Encounter   Procedures    Diet Order Diet: Renal; Second Modifier: (optional): Consistent CHO (Diabetic)     Standing Status:   Standing     Number of Occurrences:   1     Order Specific Question:   Diet:     Answer:   Renal [8]     Order Specific Question:   Second Modifier: (optional)     Answer:   Consistent CHO (Diabetic) [4]       ACTIVITY  As tolerated.  Weight bearing as tolerated    CONSULTATIONS  Nephrology and IR    PROCEDURES  Dialysis catheter placement and initiate dialysis    LABORATORY  Lab Results   Component Value Date    SODIUM 137 03/26/2024    POTASSIUM 4.0 03/26/2024    CHLORIDE 99 03/26/2024    CO2 24 03/26/2024    GLUCOSE 171 (H) 03/26/2024    BUN 42 (H) 03/26/2024    CREATININE 2.67 (H) 03/26/2024    CREATININE 1.0 01/08/2009        Lab Results   Component Value Date    WBC 9.2 03/26/2024    HEMOGLOBIN 8.8 (L) 03/26/2024    HEMATOCRIT 27.2 (L) 03/26/2024    PLATELETCT 263 03/26/2024        Total time of the discharge process exceeds 35 minutes.

## 2024-03-26 NOTE — THERAPY
Physical Therapy   Discharge     Patient Name: Anu Manuel  Age:  68 y.o., Sex:  female  Medical Record #: [id number]  Today's Date: 3/26/2024     Precautions  Precautions: Fall Risk    Assessment    Pt was seen for PT tx session after returning from dialysis.  Pt progressed to ambulate 200 ft as detailed below with SPV without AD and negotiate 2 stairs with reciprocal gait without assistance. Recommend home health PT, no further acute PT needs.    Plan    Reason for Discharge From Therapy: Discharge Secondary to Goals Met    DC Equipment Recommendations: None  Discharge Recommendations: Recommend home health for continued physical therapy services     Objective     03/26/24 1124   Precautions   Precautions Fall Risk   Pain 0 - 10 Group   Therapist Pain Assessment Post Activity Pain Same as Prior to Activity;Nurse Notified;0   Cognition    Cognition / Consciousness WDL   Level of Consciousness Alert   Comments Pleasant & coopereative   Balance   Sitting Balance (Static) Good   Sitting Balance (Dynamic) Good   Standing Balance (Static) Fair +   Standing Balance (Dynamic) Fair +   Weight Shift Sitting Good   Weight Shift Standing Good   Skilled Intervention Verbal Cuing   Bed Mobility    Supine to Sit Supervised   Sit to Supine Supervised   Scooting Supervised   Skilled Intervention Verbal Cuing   Gait Analysis   Gait Level Of Assist Supervised   Assistive Device None   Distance (Feet) 200   # of Times Distance was Traveled 1   # of Stairs Climbed 2   Level of Assist with Stairs Supervised   Weight Bearing Status No restrictions   Skilled Intervention Verbal Cuing   Functional Mobility   Sit to Stand Supervised   Bed, Chair, Wheelchair Transfer Supervised   Transfer Method Steady Pivot Lift   Mobility bed mob, amb, stairs   Skilled Intervention Verbal Cuing   Activity Tolerance   Comments functional   Short Term Goals    Short Term Goal # 1 Pt will perform stand step transfers with FWW and SPV in 6 visits to  get in/out of chair   Goal Outcome # 1 Goal met   Short Term Goal # 2 Pt will ambulate 150ft with FWW and SPV in 6 visits to access home environment   Goal Outcome # 2 Goal met   Short Term Goal # 3 pt will ascend/descend 3 steps with SPC and SPV in 6 visits to safely enter/exit her home   Goal Outcome # 3 Goal met   Physical Therapy Treatment Plan   Reason For Discharge Discharge Secondary to Goals Met

## 2024-03-26 NOTE — PROCEDURES
Pt presented with RHEA and CKD IV, uremia started on hemodialysis.  Pt is doing better.  Seen and examined while getting HD.

## 2024-03-26 NOTE — PROGRESS NOTES
Hemodialysis ordered by Dr. Najjar. Treatment stared at 0753 and ended at 1053. Pt stable, vss, no c/o post tx. Net UF 2.3 L. Report to ALISTAIR Malagon RN.

## 2024-03-26 NOTE — THERAPY
Physical Therapy Contact Note    Patient Name: Anu Manuel  Age:  68 y.o., Sex:  female  Medical Record #: [id number]  Today's Date: 3/26/2024       03/26/24 1036   Interdisciplinary Plan of Care Collaboration   Collaboration Comments PT tx attempted, pt in dialysis.  Will round back as able/appropriate.

## 2024-03-27 NOTE — DISCHARGE PLANNING
[facility] TCN chart review completed. Collaborated with PT. Current discharge considerations are home with home health.  HH choice obtained as PT now recommending home with home health. Patient reports she has Dialysis chair set up.   TCN will continue to follow and collaborate with discharge planning team as additional post acute needs arise. Thank you.    Completed:  PT recommends home health - 3/25  OT Anticipate that the patient will have no further occupational therapy needs on 3/25/24 after discharge from the hospital.    Choice obtained: none; blanket SNF referrals sent per  blanket referral policy; has 2 accepting as below with 3 additional facilities pending and 2 declinations  [facility]

## 2024-03-28 RX ORDER — INSULIN GLARGINE 100 [IU]/ML
35 INJECTION, SOLUTION SUBCUTANEOUS EVERY EVENING
Qty: 21 ML | Refills: 0 | Status: CANCELLED | OUTPATIENT
Start: 2024-03-28 | End: 2024-05-27

## 2024-03-29 DIAGNOSIS — E10.9 TYPE 1 DIABETES MELLITUS ON INSULIN THERAPY (HCC): Primary | ICD-10-CM

## 2024-03-29 RX ORDER — INSULIN GLARGINE 100 [IU]/ML
35 INJECTION, SOLUTION SUBCUTANEOUS EVERY EVENING
Qty: 21 ML | Refills: 0 | Status: CANCELLED | OUTPATIENT
Start: 2024-03-28 | End: 2024-05-27

## 2024-04-01 ENCOUNTER — APPOINTMENT (OUTPATIENT)
Dept: RADIOLOGY | Facility: MEDICAL CENTER | Age: 69
DRG: 535 | End: 2024-04-01
Attending: STUDENT IN AN ORGANIZED HEALTH CARE EDUCATION/TRAINING PROGRAM
Payer: MEDICARE

## 2024-04-01 ENCOUNTER — HOSPITAL ENCOUNTER (INPATIENT)
Facility: MEDICAL CENTER | Age: 69
LOS: 4 days | DRG: 535 | End: 2024-04-05
Attending: STUDENT IN AN ORGANIZED HEALTH CARE EDUCATION/TRAINING PROGRAM | Admitting: INTERNAL MEDICINE
Payer: MEDICARE

## 2024-04-01 ENCOUNTER — TELEPHONE (OUTPATIENT)
Dept: ENDOCRINOLOGY | Facility: MEDICAL CENTER | Age: 69
End: 2024-04-01
Payer: MEDICARE
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
## 2024-04-01 DIAGNOSIS — S32.509B: ICD-10-CM

## 2024-04-01 DIAGNOSIS — E89.0 HYPOTHYROIDISM, POSTSURGICAL: ICD-10-CM

## 2024-04-01 DIAGNOSIS — R52 INTRACTABLE PAIN: ICD-10-CM

## 2024-04-01 DIAGNOSIS — D64.9 CHRONIC ANEMIA: ICD-10-CM

## 2024-04-01 DIAGNOSIS — Z99.2 ESRD ON HEMODIALYSIS (HCC): ICD-10-CM

## 2024-04-01 DIAGNOSIS — N18.6 ESRD ON HEMODIALYSIS (HCC): ICD-10-CM

## 2024-04-01 DIAGNOSIS — S32.591A CLOSED FRACTURE OF RAMUS OF RIGHT PUBIS, INITIAL ENCOUNTER (HCC): ICD-10-CM

## 2024-04-01 DIAGNOSIS — R94.31 T WAVE INVERSION IN EKG: ICD-10-CM

## 2024-04-01 DIAGNOSIS — I95.1 ORTHOSTATIC SYNCOPE: ICD-10-CM

## 2024-04-01 DIAGNOSIS — E10.9 TYPE 1 DIABETES MELLITUS ON INSULIN THERAPY (HCC): ICD-10-CM

## 2024-04-01 LAB
ALBUMIN SERPL BCP-MCNC: 3.2 G/DL (ref 3.2–4.9)
ALBUMIN/GLOB SERPL: 0.7 G/DL
ALP SERPL-CCNC: 776 U/L (ref 30–99)
ALT SERPL-CCNC: 28 U/L (ref 2–50)
ANION GAP SERPL CALC-SCNC: 16 MMOL/L (ref 7–16)
AST SERPL-CCNC: 46 U/L (ref 12–45)
BASOPHILS # BLD AUTO: 1.2 % (ref 0–1.8)
BASOPHILS # BLD: 0.1 K/UL (ref 0–0.12)
BILIRUB SERPL-MCNC: 0.4 MG/DL (ref 0.1–1.5)
BUN SERPL-MCNC: 23 MG/DL (ref 8–22)
CALCIUM ALBUM COR SERPL-MCNC: 8.4 MG/DL (ref 8.5–10.5)
CALCIUM SERPL-MCNC: 7.8 MG/DL (ref 8.5–10.5)
CHLORIDE SERPL-SCNC: 99 MMOL/L (ref 96–112)
CO2 SERPL-SCNC: 25 MMOL/L (ref 20–33)
CREAT SERPL-MCNC: 3.02 MG/DL (ref 0.5–1.4)
EKG IMPRESSION: NORMAL
EOSINOPHIL # BLD AUTO: 0.19 K/UL (ref 0–0.51)
EOSINOPHIL NFR BLD: 2.3 % (ref 0–6.9)
ERYTHROCYTE [DISTWIDTH] IN BLOOD BY AUTOMATED COUNT: 57 FL (ref 35.9–50)
GFR SERPLBLD CREATININE-BSD FMLA CKD-EPI: 16 ML/MIN/1.73 M 2
GLOBULIN SER CALC-MCNC: 4.8 G/DL (ref 1.9–3.5)
GLUCOSE BLD STRIP.AUTO-MCNC: 83 MG/DL (ref 65–99)
GLUCOSE SERPL-MCNC: 90 MG/DL (ref 65–99)
HCT VFR BLD AUTO: 26.7 % (ref 37–47)
HGB BLD-MCNC: 8.5 G/DL (ref 12–16)
IMM GRANULOCYTES # BLD AUTO: 0.03 K/UL (ref 0–0.11)
IMM GRANULOCYTES NFR BLD AUTO: 0.4 % (ref 0–0.9)
LYMPHOCYTES # BLD AUTO: 0.84 K/UL (ref 1–4.8)
LYMPHOCYTES NFR BLD: 10 % (ref 22–41)
MCH RBC QN AUTO: 29.6 PG (ref 27–33)
MCHC RBC AUTO-ENTMCNC: 31.8 G/DL (ref 32.2–35.5)
MCV RBC AUTO: 93 FL (ref 81.4–97.8)
MONOCYTES # BLD AUTO: 0.7 K/UL (ref 0–0.85)
MONOCYTES NFR BLD AUTO: 8.4 % (ref 0–13.4)
NEUTROPHILS # BLD AUTO: 6.51 K/UL (ref 1.82–7.42)
NEUTROPHILS NFR BLD: 77.7 % (ref 44–72)
NRBC # BLD AUTO: 0 K/UL
NRBC BLD-RTO: 0 /100 WBC (ref 0–0.2)
NT-PROBNP SERPL IA-MCNC: 4013 PG/ML (ref 0–125)
PLATELET # BLD AUTO: 255 K/UL (ref 164–446)
PMV BLD AUTO: 10.8 FL (ref 9–12.9)
POTASSIUM SERPL-SCNC: 3.8 MMOL/L (ref 3.6–5.5)
PROT SERPL-MCNC: 8 G/DL (ref 6–8.2)
RBC # BLD AUTO: 2.87 M/UL (ref 4.2–5.4)
SODIUM SERPL-SCNC: 140 MMOL/L (ref 135–145)
T4 FREE SERPL-MCNC: 1.1 NG/DL (ref 0.93–1.7)
TSH SERPL DL<=0.005 MIU/L-ACNC: 93.4 UIU/ML (ref 0.38–5.33)
WBC # BLD AUTO: 8.4 K/UL (ref 4.8–10.8)

## 2024-04-01 PROCEDURE — 36415 COLL VENOUS BLD VENIPUNCTURE: CPT

## 2024-04-01 PROCEDURE — 99222 1ST HOSP IP/OBS MODERATE 55: CPT | Mod: AI,25 | Performed by: INTERNAL MEDICINE

## 2024-04-01 PROCEDURE — 99406 BEHAV CHNG SMOKING 3-10 MIN: CPT

## 2024-04-01 PROCEDURE — 80053 COMPREHEN METABOLIC PANEL: CPT

## 2024-04-01 PROCEDURE — 82962 GLUCOSE BLOOD TEST: CPT

## 2024-04-01 PROCEDURE — 96374 THER/PROPH/DIAG INJ IV PUSH: CPT

## 2024-04-01 PROCEDURE — 84439 ASSAY OF FREE THYROXINE: CPT

## 2024-04-01 PROCEDURE — 70450 CT HEAD/BRAIN W/O DYE: CPT

## 2024-04-01 PROCEDURE — 72192 CT PELVIS W/O DYE: CPT

## 2024-04-01 PROCEDURE — 99221 1ST HOSP IP/OBS SF/LOW 40: CPT | Performed by: STUDENT IN AN ORGANIZED HEALTH CARE EDUCATION/TRAINING PROGRAM

## 2024-04-01 PROCEDURE — 770020 HCHG ROOM/CARE - TELE (206)

## 2024-04-01 PROCEDURE — 83880 ASSAY OF NATRIURETIC PEPTIDE: CPT

## 2024-04-01 PROCEDURE — 85025 COMPLETE CBC W/AUTO DIFF WBC: CPT

## 2024-04-01 PROCEDURE — 84443 ASSAY THYROID STIM HORMONE: CPT

## 2024-04-01 PROCEDURE — 99285 EMERGENCY DEPT VISIT HI MDM: CPT

## 2024-04-01 PROCEDURE — 700111 HCHG RX REV CODE 636 W/ 250 OVERRIDE (IP): Mod: JZ | Performed by: STUDENT IN AN ORGANIZED HEALTH CARE EDUCATION/TRAINING PROGRAM

## 2024-04-01 PROCEDURE — 99406 BEHAV CHNG SMOKING 3-10 MIN: CPT | Performed by: INTERNAL MEDICINE

## 2024-04-01 PROCEDURE — 96375 TX/PRO/DX INJ NEW DRUG ADDON: CPT

## 2024-04-01 PROCEDURE — 93005 ELECTROCARDIOGRAM TRACING: CPT | Performed by: STUDENT IN AN ORGANIZED HEALTH CARE EDUCATION/TRAINING PROGRAM

## 2024-04-01 RX ORDER — OXYCODONE HYDROCHLORIDE 10 MG/1
10 TABLET ORAL
Status: DISCONTINUED | OUTPATIENT
Start: 2024-04-01 | End: 2024-04-04

## 2024-04-01 RX ORDER — OXYCODONE HYDROCHLORIDE 5 MG/1
5 TABLET ORAL
Status: DISCONTINUED | OUTPATIENT
Start: 2024-04-01 | End: 2024-04-01

## 2024-04-01 RX ORDER — HYDROMORPHONE HYDROCHLORIDE 1 MG/ML
1 INJECTION, SOLUTION INTRAMUSCULAR; INTRAVENOUS; SUBCUTANEOUS ONCE
Status: COMPLETED | OUTPATIENT
Start: 2024-04-01 | End: 2024-04-01

## 2024-04-01 RX ORDER — ONDANSETRON 4 MG/1
4 TABLET, ORALLY DISINTEGRATING ORAL EVERY 4 HOURS PRN
Status: DISCONTINUED | OUTPATIENT
Start: 2024-04-01 | End: 2024-04-05 | Stop reason: HOSPADM

## 2024-04-01 RX ORDER — VENLAFAXINE HYDROCHLORIDE 75 MG/1
150 CAPSULE, EXTENDED RELEASE ORAL DAILY
Status: DISCONTINUED | OUTPATIENT
Start: 2024-04-02 | End: 2024-04-05 | Stop reason: HOSPADM

## 2024-04-01 RX ORDER — OMEPRAZOLE 20 MG/1
40 CAPSULE, DELAYED RELEASE ORAL DAILY
Status: DISCONTINUED | OUTPATIENT
Start: 2024-04-02 | End: 2024-04-05 | Stop reason: HOSPADM

## 2024-04-01 RX ORDER — AMOXICILLIN 250 MG
2 CAPSULE ORAL EVERY EVENING
Status: DISCONTINUED | OUTPATIENT
Start: 2024-04-01 | End: 2024-04-05 | Stop reason: HOSPADM

## 2024-04-01 RX ORDER — OXYCODONE HYDROCHLORIDE 5 MG/1
2.5 TABLET ORAL
Status: DISCONTINUED | OUTPATIENT
Start: 2024-04-01 | End: 2024-04-01

## 2024-04-01 RX ORDER — TRAZODONE HYDROCHLORIDE 50 MG/1
150 TABLET ORAL
Status: DISCONTINUED | OUTPATIENT
Start: 2024-04-01 | End: 2024-04-05 | Stop reason: HOSPADM

## 2024-04-01 RX ORDER — FAMOTIDINE 20 MG/1
20 TABLET, FILM COATED ORAL DAILY
Status: DISCONTINUED | OUTPATIENT
Start: 2024-04-02 | End: 2024-04-05 | Stop reason: HOSPADM

## 2024-04-01 RX ORDER — TORSEMIDE 100 MG/1
100 TABLET ORAL DAILY
Status: DISCONTINUED | OUTPATIENT
Start: 2024-04-02 | End: 2024-04-05 | Stop reason: HOSPADM

## 2024-04-01 RX ORDER — ATORVASTATIN CALCIUM 40 MG/1
40 TABLET, FILM COATED ORAL NIGHTLY
Status: DISCONTINUED | OUTPATIENT
Start: 2024-04-01 | End: 2024-04-05 | Stop reason: HOSPADM

## 2024-04-01 RX ORDER — LEVOTHYROXINE SODIUM 0.12 MG/1
250 TABLET ORAL
Qty: 60 TABLET | Refills: 5 | Status: SHIPPED | OUTPATIENT
Start: 2024-04-01 | End: 2024-04-11

## 2024-04-01 RX ORDER — URSODIOL 300 MG/1
300-600 CAPSULE ORAL 2 TIMES DAILY
Status: DISCONTINUED | OUTPATIENT
Start: 2024-04-01 | End: 2024-04-02

## 2024-04-01 RX ORDER — POLYETHYLENE GLYCOL 3350 17 G/17G
1 POWDER, FOR SOLUTION ORAL
Status: DISCONTINUED | OUTPATIENT
Start: 2024-04-01 | End: 2024-04-05 | Stop reason: HOSPADM

## 2024-04-01 RX ORDER — ACETAMINOPHEN 325 MG/1
650 TABLET ORAL EVERY 6 HOURS PRN
Status: DISCONTINUED | OUTPATIENT
Start: 2024-04-01 | End: 2024-04-05 | Stop reason: HOSPADM

## 2024-04-01 RX ORDER — HEPARIN SODIUM 5000 [USP'U]/ML
5000 INJECTION, SOLUTION INTRAVENOUS; SUBCUTANEOUS EVERY 8 HOURS
Status: DISCONTINUED | OUTPATIENT
Start: 2024-04-01 | End: 2024-04-05 | Stop reason: HOSPADM

## 2024-04-01 RX ORDER — INSULIN GLARGINE 100 [IU]/ML
35 INJECTION, SOLUTION SUBCUTANEOUS EVERY EVENING
Qty: 15 ML | Refills: 5 | Status: SHIPPED | OUTPATIENT
Start: 2024-04-01

## 2024-04-01 RX ORDER — HYDRALAZINE HYDROCHLORIDE 25 MG/1
25 TABLET, FILM COATED ORAL EVERY 8 HOURS
Status: DISCONTINUED | OUTPATIENT
Start: 2024-04-01 | End: 2024-04-05 | Stop reason: HOSPADM

## 2024-04-01 RX ORDER — OXYCODONE HYDROCHLORIDE 5 MG/1
5 TABLET ORAL
Status: DISCONTINUED | OUTPATIENT
Start: 2024-04-01 | End: 2024-04-04

## 2024-04-01 RX ORDER — AMLODIPINE BESYLATE 5 MG/1
5 TABLET ORAL DAILY
Status: DISCONTINUED | OUTPATIENT
Start: 2024-04-02 | End: 2024-04-05 | Stop reason: HOSPADM

## 2024-04-01 RX ORDER — ERGOCALCIFEROL 1.25 MG/1
50000 CAPSULE ORAL
Status: DISCONTINUED | OUTPATIENT
Start: 2024-04-08 | End: 2024-04-05 | Stop reason: HOSPADM

## 2024-04-01 RX ORDER — ONDANSETRON 2 MG/ML
4 INJECTION INTRAMUSCULAR; INTRAVENOUS EVERY 4 HOURS PRN
Status: DISCONTINUED | OUTPATIENT
Start: 2024-04-01 | End: 2024-04-05 | Stop reason: HOSPADM

## 2024-04-01 RX ORDER — METOPROLOL SUCCINATE 100 MG/1
200 TABLET, EXTENDED RELEASE ORAL DAILY
Status: DISCONTINUED | OUTPATIENT
Start: 2024-04-02 | End: 2024-04-05 | Stop reason: HOSPADM

## 2024-04-01 RX ORDER — LIOTHYRONINE SODIUM 5 UG/1
5 TABLET ORAL DAILY
Status: DISCONTINUED | OUTPATIENT
Start: 2024-04-02 | End: 2024-04-05 | Stop reason: HOSPADM

## 2024-04-01 RX ORDER — LABETALOL HYDROCHLORIDE 5 MG/ML
10 INJECTION, SOLUTION INTRAVENOUS EVERY 4 HOURS PRN
Status: DISCONTINUED | OUTPATIENT
Start: 2024-04-01 | End: 2024-04-05 | Stop reason: HOSPADM

## 2024-04-01 RX ORDER — HYDROMORPHONE HYDROCHLORIDE 1 MG/ML
1 INJECTION, SOLUTION INTRAMUSCULAR; INTRAVENOUS; SUBCUTANEOUS
Status: DISCONTINUED | OUTPATIENT
Start: 2024-04-01 | End: 2024-04-04

## 2024-04-01 RX ORDER — ALBUTEROL SULFATE 90 UG/1
2 AEROSOL, METERED RESPIRATORY (INHALATION) EVERY 6 HOURS PRN
Status: DISCONTINUED | OUTPATIENT
Start: 2024-04-01 | End: 2024-04-05 | Stop reason: HOSPADM

## 2024-04-01 RX ORDER — LEVOTHYROXINE SODIUM 0.12 MG/1
250 TABLET ORAL
Status: DISCONTINUED | OUTPATIENT
Start: 2024-04-02 | End: 2024-04-03

## 2024-04-01 RX ORDER — HYDROMORPHONE HYDROCHLORIDE 1 MG/ML
0.25 INJECTION, SOLUTION INTRAMUSCULAR; INTRAVENOUS; SUBCUTANEOUS
Status: DISCONTINUED | OUTPATIENT
Start: 2024-04-01 | End: 2024-04-01

## 2024-04-01 RX ADMIN — HYDROMORPHONE HYDROCHLORIDE 1 MG: 1 INJECTION, SOLUTION INTRAMUSCULAR; INTRAVENOUS; SUBCUTANEOUS at 21:30

## 2024-04-01 RX ADMIN — FENTANYL CITRATE 50 MCG: 50 INJECTION, SOLUTION INTRAMUSCULAR; INTRAVENOUS at 20:06

## 2024-04-01 ASSESSMENT — PAIN DESCRIPTION - PAIN TYPE: TYPE: ACUTE PAIN

## 2024-04-01 ASSESSMENT — FIBROSIS 4 INDEX: FIB4 SCORE: 2.69

## 2024-04-01 NOTE — TELEPHONE ENCOUNTER
Contact:  Phone number:[phone] (mobile)    Name of person spoken with and relationship to patient: patient   Patient’s Adherence:  How patient is doing on medication: Ok or neutral    How many missed doses and reason: 0 N/A    Any new medications: No    Any new conditions: No    Any new allergies: No    Any new side effects: No    Any new diagnoses: No    How many doses remaining: 3    Did patient want to speak with pharmacist: No                   Teach Appointment Date:  N/A   Shipping Address:  Do Not ship   Medication(name,strength and dose):  Glargine               Additional Information:  Patient declining services   Spoke with patient regarding refill on Lantus, Patient states she wants all prescriptions sent to the [facility] pharmacy located near her home for convenience. I told her we have free delivery but she declined our services and wants to simplify her life by having all prescriptions close to her home.    Thank you,  Priscilla Calderon [facility]  Endocrinology Coordinator   [phone]

## 2024-04-02 PROBLEM — R55 SYNCOPE: Status: ACTIVE | Noted: 2024-04-02

## 2024-04-02 LAB
ALBUMIN SERPL BCP-MCNC: 3 G/DL (ref 3.2–4.9)
ALBUMIN/GLOB SERPL: 0.7 G/DL
ALP SERPL-CCNC: 687 U/L (ref 30–99)
ALT SERPL-CCNC: 25 U/L (ref 2–50)
ANION GAP SERPL CALC-SCNC: 16 MMOL/L (ref 7–16)
AST SERPL-CCNC: 41 U/L (ref 12–45)
BASOPHILS # BLD AUTO: 1.3 % (ref 0–1.8)
BASOPHILS # BLD: 0.1 K/UL (ref 0–0.12)
BILIRUB SERPL-MCNC: 0.3 MG/DL (ref 0.1–1.5)
BUN SERPL-MCNC: 24 MG/DL (ref 8–22)
CALCIUM ALBUM COR SERPL-MCNC: 8.3 MG/DL (ref 8.5–10.5)
CALCIUM SERPL-MCNC: 7.5 MG/DL (ref 8.5–10.5)
CHLORIDE SERPL-SCNC: 97 MMOL/L (ref 96–112)
CO2 SERPL-SCNC: 25 MMOL/L (ref 20–33)
CORTIS SERPL-MCNC: 23.5 UG/DL (ref 0–23)
CREAT SERPL-MCNC: 3.04 MG/DL (ref 0.5–1.4)
EOSINOPHIL # BLD AUTO: 0.18 K/UL (ref 0–0.51)
EOSINOPHIL NFR BLD: 2.4 % (ref 0–6.9)
ERYTHROCYTE [DISTWIDTH] IN BLOOD BY AUTOMATED COUNT: 56 FL (ref 35.9–50)
GFR SERPLBLD CREATININE-BSD FMLA CKD-EPI: 16 ML/MIN/1.73 M 2
GLOBULIN SER CALC-MCNC: 4.4 G/DL (ref 1.9–3.5)
GLUCOSE BLD STRIP.AUTO-MCNC: 167 MG/DL (ref 65–99)
GLUCOSE BLD STRIP.AUTO-MCNC: 309 MG/DL (ref 65–99)
GLUCOSE BLD STRIP.AUTO-MCNC: 481 MG/DL (ref 65–99)
GLUCOSE SERPL-MCNC: 149 MG/DL (ref 65–99)
HAV IGM SERPL QL IA: NORMAL
HBV CORE IGM SER QL: NORMAL
HBV SURFACE AB SERPL IA-ACNC: <3.5 MIU/ML (ref 0–10)
HBV SURFACE AG SER QL: NORMAL
HCT VFR BLD AUTO: 25.3 % (ref 37–47)
HCV AB SER QL: NORMAL
HGB BLD-MCNC: 7.9 G/DL (ref 12–16)
IMM GRANULOCYTES # BLD AUTO: 0.02 K/UL (ref 0–0.11)
IMM GRANULOCYTES NFR BLD AUTO: 0.3 % (ref 0–0.9)
LYMPHOCYTES # BLD AUTO: 0.85 K/UL (ref 1–4.8)
LYMPHOCYTES NFR BLD: 11.4 % (ref 22–41)
MCH RBC QN AUTO: 28.7 PG (ref 27–33)
MCHC RBC AUTO-ENTMCNC: 31.2 G/DL (ref 32.2–35.5)
MCV RBC AUTO: 92 FL (ref 81.4–97.8)
MONOCYTES # BLD AUTO: 0.82 K/UL (ref 0–0.85)
MONOCYTES NFR BLD AUTO: 11 % (ref 0–13.4)
NEUTROPHILS # BLD AUTO: 5.47 K/UL (ref 1.82–7.42)
NEUTROPHILS NFR BLD: 73.6 % (ref 44–72)
NRBC # BLD AUTO: 0 K/UL
NRBC BLD-RTO: 0 /100 WBC (ref 0–0.2)
PLATELET # BLD AUTO: 200 K/UL (ref 164–446)
PMV BLD AUTO: 10.3 FL (ref 9–12.9)
POTASSIUM SERPL-SCNC: 4 MMOL/L (ref 3.6–5.5)
PROT SERPL-MCNC: 7.4 G/DL (ref 6–8.2)
RBC # BLD AUTO: 2.75 M/UL (ref 4.2–5.4)
SODIUM SERPL-SCNC: 138 MMOL/L (ref 135–145)
TROPONIN T SERPL-MCNC: 86 NG/L (ref 6–19)
WBC # BLD AUTO: 7.4 K/UL (ref 4.8–10.8)

## 2024-04-02 PROCEDURE — 99222 1ST HOSP IP/OBS MODERATE 55: CPT | Performed by: INTERNAL MEDICINE

## 2024-04-02 PROCEDURE — A9270 NON-COVERED ITEM OR SERVICE: HCPCS | Performed by: INTERNAL MEDICINE

## 2024-04-02 PROCEDURE — 770020 HCHG ROOM/CARE - TELE (206)

## 2024-04-02 PROCEDURE — 700102 HCHG RX REV CODE 250 W/ 637 OVERRIDE(OP)

## 2024-04-02 PROCEDURE — 86706 HEP B SURFACE ANTIBODY: CPT

## 2024-04-02 PROCEDURE — 84484 ASSAY OF TROPONIN QUANT: CPT

## 2024-04-02 PROCEDURE — 80053 COMPREHEN METABOLIC PANEL: CPT

## 2024-04-02 PROCEDURE — 82533 TOTAL CORTISOL: CPT

## 2024-04-02 PROCEDURE — 80074 ACUTE HEPATITIS PANEL: CPT

## 2024-04-02 PROCEDURE — 700111 HCHG RX REV CODE 636 W/ 250 OVERRIDE (IP)

## 2024-04-02 PROCEDURE — 82962 GLUCOSE BLOOD TEST: CPT

## 2024-04-02 PROCEDURE — A9270 NON-COVERED ITEM OR SERVICE: HCPCS

## 2024-04-02 PROCEDURE — 99233 SBSQ HOSP IP/OBS HIGH 50: CPT | Performed by: INTERNAL MEDICINE

## 2024-04-02 PROCEDURE — 99232 SBSQ HOSP IP/OBS MODERATE 35: CPT | Performed by: PHYSICIAN ASSISTANT

## 2024-04-02 PROCEDURE — 85025 COMPLETE CBC W/AUTO DIFF WBC: CPT

## 2024-04-02 PROCEDURE — 700102 HCHG RX REV CODE 250 W/ 637 OVERRIDE(OP): Performed by: INTERNAL MEDICINE

## 2024-04-02 PROCEDURE — 700111 HCHG RX REV CODE 636 W/ 250 OVERRIDE (IP): Performed by: INTERNAL MEDICINE

## 2024-04-02 PROCEDURE — 90935 HEMODIALYSIS ONE EVALUATION: CPT

## 2024-04-02 PROCEDURE — 36415 COLL VENOUS BLD VENIPUNCTURE: CPT

## 2024-04-02 RX ORDER — URSODIOL 300 MG/1
600 CAPSULE ORAL EVERY EVENING
Status: DISCONTINUED | OUTPATIENT
Start: 2024-04-02 | End: 2024-04-05 | Stop reason: HOSPADM

## 2024-04-02 RX ORDER — SODIUM CHLORIDE 9 MG/ML
250 INJECTION, SOLUTION INTRAVENOUS
Status: DISCONTINUED | OUTPATIENT
Start: 2024-04-02 | End: 2024-04-05 | Stop reason: HOSPADM

## 2024-04-02 RX ORDER — HYDROXYZINE HYDROCHLORIDE 25 MG/1
25 TABLET, FILM COATED ORAL 3 TIMES DAILY PRN
Status: DISCONTINUED | OUTPATIENT
Start: 2024-04-02 | End: 2024-04-03

## 2024-04-02 RX ORDER — ALPRAZOLAM 0.25 MG/1
0.25 TABLET ORAL ONCE
Status: COMPLETED | OUTPATIENT
Start: 2024-04-02 | End: 2024-04-02

## 2024-04-02 RX ORDER — NICOTINE 21 MG/24HR
21 PATCH, TRANSDERMAL 24 HOURS TRANSDERMAL
Status: DISCONTINUED | OUTPATIENT
Start: 2024-04-02 | End: 2024-04-05 | Stop reason: HOSPADM

## 2024-04-02 RX ORDER — HEPARIN SODIUM 1000 [USP'U]/ML
3600 INJECTION, SOLUTION INTRAVENOUS; SUBCUTANEOUS
Status: DISCONTINUED | OUTPATIENT
Start: 2024-04-02 | End: 2024-04-05 | Stop reason: HOSPADM

## 2024-04-02 RX ORDER — URSODIOL 300 MG/1
300 CAPSULE ORAL EVERY MORNING
Status: DISCONTINUED | OUTPATIENT
Start: 2024-04-02 | End: 2024-04-05 | Stop reason: HOSPADM

## 2024-04-02 RX ORDER — HEPARIN SODIUM 1000 [USP'U]/ML
INJECTION, SOLUTION INTRAVENOUS; SUBCUTANEOUS
Status: COMPLETED
Start: 2024-04-02 | End: 2024-04-02

## 2024-04-02 RX ADMIN — ALBUTEROL SULFATE 2 PUFF: 90 AEROSOL, METERED RESPIRATORY (INHALATION) at 06:38

## 2024-04-02 RX ADMIN — HYDRALAZINE HYDROCHLORIDE 25 MG: 25 TABLET ORAL at 14:53

## 2024-04-02 RX ADMIN — OXYCODONE HYDROCHLORIDE 10 MG: 10 TABLET ORAL at 04:49

## 2024-04-02 RX ADMIN — LIOTHYRONINE SODIUM 5 MCG: 5 TABLET ORAL at 06:29

## 2024-04-02 RX ADMIN — DOCUSATE SODIUM 50 MG AND SENNOSIDES 8.6 MG 2 TABLET: 8.6; 5 TABLET, FILM COATED ORAL at 00:53

## 2024-04-02 RX ADMIN — HYDRALAZINE HYDROCHLORIDE 25 MG: 25 TABLET ORAL at 21:40

## 2024-04-02 RX ADMIN — HYDRALAZINE HYDROCHLORIDE 25 MG: 25 TABLET ORAL at 06:26

## 2024-04-02 RX ADMIN — UMECLIDINIUM BROMIDE AND VILANTEROL TRIFENATATE 1 PUFF: 62.5; 25 POWDER RESPIRATORY (INHALATION) at 06:29

## 2024-04-02 RX ADMIN — NICOTINE TRANSDERMAL SYSTEM 21 MG: 21 PATCH, EXTENDED RELEASE TRANSDERMAL at 05:56

## 2024-04-02 RX ADMIN — ATORVASTATIN CALCIUM 40 MG: 40 TABLET, FILM COATED ORAL at 21:40

## 2024-04-02 RX ADMIN — AMLODIPINE BESYLATE 5 MG: 5 TABLET ORAL at 06:28

## 2024-04-02 RX ADMIN — LEVOTHYROXINE SODIUM 250 MCG: 0.12 TABLET ORAL at 06:28

## 2024-04-02 RX ADMIN — TRAZODONE HYDROCHLORIDE 150 MG: 100 TABLET ORAL at 00:54

## 2024-04-02 RX ADMIN — HYDROMORPHONE HYDROCHLORIDE 1 MG: 1 INJECTION, SOLUTION INTRAMUSCULAR; INTRAVENOUS; SUBCUTANEOUS at 18:17

## 2024-04-02 RX ADMIN — HYDROMORPHONE HYDROCHLORIDE 1 MG: 1 INJECTION, SOLUTION INTRAMUSCULAR; INTRAVENOUS; SUBCUTANEOUS at 22:57

## 2024-04-02 RX ADMIN — HYDROMORPHONE HYDROCHLORIDE 1 MG: 1 INJECTION, SOLUTION INTRAMUSCULAR; INTRAVENOUS; SUBCUTANEOUS at 05:56

## 2024-04-02 RX ADMIN — DOCUSATE SODIUM 50 MG AND SENNOSIDES 8.6 MG 2 TABLET: 8.6; 5 TABLET, FILM COATED ORAL at 17:11

## 2024-04-02 RX ADMIN — HYDROXYZINE HYDROCHLORIDE 25 MG: 25 TABLET, FILM COATED ORAL at 22:57

## 2024-04-02 RX ADMIN — TRAZODONE HYDROCHLORIDE 150 MG: 100 TABLET ORAL at 21:39

## 2024-04-02 RX ADMIN — INSULIN HUMAN 4 UNITS: 100 INJECTION, SOLUTION PARENTERAL at 17:12

## 2024-04-02 RX ADMIN — INSULIN HUMAN 1 UNITS: 100 INJECTION, SOLUTION PARENTERAL at 08:12

## 2024-04-02 RX ADMIN — ALBUTEROL SULFATE 2 PUFF: 90 AEROSOL, METERED RESPIRATORY (INHALATION) at 00:53

## 2024-04-02 RX ADMIN — EPOETIN ALFA 4000 UNITS: 4000 SOLUTION INTRAVENOUS; SUBCUTANEOUS at 13:33

## 2024-04-02 RX ADMIN — FAMOTIDINE 20 MG: 20 TABLET, FILM COATED ORAL at 06:28

## 2024-04-02 RX ADMIN — OXYCODONE HYDROCHLORIDE 10 MG: 10 TABLET ORAL at 21:39

## 2024-04-02 RX ADMIN — OMEPRAZOLE 40 MG: 20 CAPSULE, DELAYED RELEASE ORAL at 06:26

## 2024-04-02 RX ADMIN — VENLAFAXINE HYDROCHLORIDE 150 MG: 75 CAPSULE, EXTENDED RELEASE ORAL at 06:28

## 2024-04-02 RX ADMIN — INSULIN HUMAN 6 UNITS: 100 INJECTION, SOLUTION PARENTERAL at 21:35

## 2024-04-02 RX ADMIN — OXYCODONE HYDROCHLORIDE 10 MG: 10 TABLET ORAL at 00:29

## 2024-04-02 RX ADMIN — OXYCODONE HYDROCHLORIDE 10 MG: 10 TABLET ORAL at 10:11

## 2024-04-02 RX ADMIN — ONDANSETRON 4 MG: 4 TABLET, ORALLY DISINTEGRATING ORAL at 06:45

## 2024-04-02 RX ADMIN — HYDRALAZINE HYDROCHLORIDE 25 MG: 25 TABLET ORAL at 00:54

## 2024-04-02 RX ADMIN — ONDANSETRON 4 MG: 4 TABLET, ORALLY DISINTEGRATING ORAL at 00:54

## 2024-04-02 RX ADMIN — HYDROMORPHONE HYDROCHLORIDE 1 MG: 1 INJECTION, SOLUTION INTRAMUSCULAR; INTRAVENOUS; SUBCUTANEOUS at 01:34

## 2024-04-02 RX ADMIN — HEPARIN SODIUM 3600 UNITS: 1000 INJECTION, SOLUTION INTRAVENOUS; SUBCUTANEOUS at 13:54

## 2024-04-02 RX ADMIN — URSODIOL 600 MG: 300 CAPSULE ORAL at 17:11

## 2024-04-02 RX ADMIN — NICOTINE POLACRILEX 2 MG: 2 GUM, CHEWING BUCCAL at 00:55

## 2024-04-02 RX ADMIN — METOPROLOL SUCCINATE 200 MG: 50 TABLET, FILM COATED, EXTENDED RELEASE ORAL at 06:26

## 2024-04-02 RX ADMIN — ALPRAZOLAM 0.25 MG: 0.25 TABLET ORAL at 10:11

## 2024-04-02 RX ADMIN — ATORVASTATIN CALCIUM 40 MG: 40 TABLET, FILM COATED ORAL at 00:54

## 2024-04-02 RX ADMIN — HEPARIN SODIUM 5000 UNITS: 5000 INJECTION, SOLUTION INTRAVENOUS; SUBCUTANEOUS at 14:52

## 2024-04-02 RX ADMIN — TORSEMIDE 100 MG: 100 TABLET ORAL at 06:36

## 2024-04-02 RX ADMIN — URSODIOL 300 MG: 300 CAPSULE ORAL at 06:29

## 2024-04-02 RX ADMIN — OXYCODONE HYDROCHLORIDE 10 MG: 10 TABLET ORAL at 17:10

## 2024-04-02 ASSESSMENT — ENCOUNTER SYMPTOMS
WEIGHT LOSS: 0
ROS GI COMMENTS: BM PTA
SENSORY CHANGE: 0
WEAKNESS: 0
FOCAL WEAKNESS: 0
PALPITATIONS: 0
BACK PAIN: 0
HEARTBURN: 0
BRUISES/BLEEDS EASILY: 0
CHILLS: 0
ABDOMINAL PAIN: 0
FEVER: 0
TREMORS: 0
SPUTUM PRODUCTION: 0
POLYDIPSIA: 0
SHORTNESS OF BREATH: 0
NECK PAIN: 0
COUGH: 0
NAUSEA: 0
HEMOPTYSIS: 0
SPEECH CHANGE: 0
FLANK PAIN: 0
HEADACHES: 0
MYALGIAS: 1
HALLUCINATIONS: 0
NERVOUS/ANXIOUS: 0
ORTHOPNEA: 0
TINGLING: 0
PHOTOPHOBIA: 0
VOMITING: 0
DOUBLE VISION: 0
BLURRED VISION: 0

## 2024-04-02 ASSESSMENT — COGNITIVE AND FUNCTIONAL STATUS - GENERAL
DRESSING REGULAR LOWER BODY CLOTHING: A LITTLE
HELP NEEDED FOR BATHING: A LITTLE
SUGGESTED CMS G CODE MODIFIER DAILY ACTIVITY: CJ
CLIMB 3 TO 5 STEPS WITH RAILING: TOTAL
WALKING IN HOSPITAL ROOM: TOTAL
DAILY ACTIVITIY SCORE: 21
DRESSING REGULAR UPPER BODY CLOTHING: A LITTLE
MOVING TO AND FROM BED TO CHAIR: A LOT
SUGGESTED CMS G CODE MODIFIER MOBILITY: CL
MOVING FROM LYING ON BACK TO SITTING ON SIDE OF FLAT BED: A LOT
STANDING UP FROM CHAIR USING ARMS: A LOT
TURNING FROM BACK TO SIDE WHILE IN FLAT BAD: A LOT
MOBILITY SCORE: 10

## 2024-04-02 ASSESSMENT — LIFESTYLE VARIABLES
TOTAL SCORE: 0
TOTAL SCORE: 0
DOES PATIENT WANT TO STOP DRINKING: NO
EVER FELT BAD OR GUILTY ABOUT YOUR DRINKING: NO
EVER HAD A DRINK FIRST THING IN THE MORNING TO STEADY YOUR NERVES TO GET RID OF A HANGOVER: NO
SUBSTANCE_ABUSE: 0
HOW MANY TIMES IN THE PAST YEAR HAVE YOU HAD 5 OR MORE DRINKS IN A DAY: 0
HAVE YOU EVER FELT YOU SHOULD CUT DOWN ON YOUR DRINKING: NO
AVERAGE NUMBER OF DAYS PER WEEK YOU HAVE A DRINK CONTAINING ALCOHOL: 0
ALCOHOL_USE: NO
CONSUMPTION TOTAL: NEGATIVE
HAVE PEOPLE ANNOYED YOU BY CRITICIZING YOUR DRINKING: NO
TOTAL SCORE: 0
ON A TYPICAL DAY WHEN YOU DRINK ALCOHOL HOW MANY DRINKS DO YOU HAVE: 0

## 2024-04-02 ASSESSMENT — PAIN DESCRIPTION - PAIN TYPE
TYPE: ACUTE PAIN

## 2024-04-02 ASSESSMENT — PATIENT HEALTH QUESTIONNAIRE - PHQ9
6. FEELING BAD ABOUT YOURSELF - OR THAT YOU ARE A FAILURE OR HAVE LET YOURSELF OR YOUR FAMILY DOWN: MORE THAN HALF THE DAYS
SUM OF ALL RESPONSES TO PHQ9 QUESTIONS 1 AND 2: 6
9. THOUGHTS THAT YOU WOULD BE BETTER OFF DEAD, OR OF HURTING YOURSELF: NOT AT ALL
2. FEELING DOWN, DEPRESSED, IRRITABLE, OR HOPELESS: NEARLY EVERY DAY
5. POOR APPETITE OR OVEREATING: MORE THAN HALF THE DAYS
8. MOVING OR SPEAKING SO SLOWLY THAT OTHER PEOPLE COULD HAVE NOTICED. OR THE OPPOSITE, BEING SO FIGETY OR RESTLESS THAT YOU HAVE BEEN MOVING AROUND A LOT MORE THAN USUAL: SEVERAL DAYS
3. TROUBLE FALLING OR STAYING ASLEEP OR SLEEPING TOO MUCH: NEARLY EVERY DAY
4. FEELING TIRED OR HAVING LITTLE ENERGY: NEARLY EVERY DAY
1. LITTLE INTEREST OR PLEASURE IN DOING THINGS: NEARLY EVERY DAY
SUM OF ALL RESPONSES TO PHQ QUESTIONS 1-9: 20
7. TROUBLE CONCENTRATING ON THINGS, SUCH AS READING THE NEWSPAPER OR WATCHING TELEVISION: NEARLY EVERY DAY

## 2024-04-02 ASSESSMENT — FIBROSIS 4 INDEX
FIB4 SCORE: 2.25
FIB4 SCORE: 2.706
FIB4 SCORE: 2.25

## 2024-04-02 NOTE — DISCHARGE PLANNING
"HTH/SCP TCN chart review completed. Collaborated with CM Terrie prior to meeting with the pt. The most current review of medical record, knowledge of pt's PLOF and social support, LACE+ score of 72, 6 clicks scores of 21 ADL and 10 mobility were considered.      TCN met with patient at bedside. Patient verbalized feeling \"upset\" but stated appreciation for TCN visit \"you are exactly who I have wanting to talk to.\"  Introduced TCN program. Provided education regarding post acute levels of care. Education provided regarding case management policy for blanket SNF referrals. Discussed HTH/SCP plan benefits. Pt verbalized understanding.     Patient endorsed she resides lone noting she uses a 4WW at baseline with walker noted to be present in patient room. Patient endorsed she is followed by outpatient palliative. Patient endorsed she would like to speak with palliative care while inpatient. CM and attending physician notified of patient request via voalte.     Patient endorsed she would be open to considering rehabilitation at either [facility] or home healthcare. Patient declined consideration of SNF but was agreeable to  policy in sending SNF referrals should post-acute placement be recommended by therapy/ physician.     Based on patient potential discharge planning needs, Choice proactively obtained for IRF ([facility]), HH ([facility]) and DME ([facility]), faxed to [facility] to be stored in media and given to CM to be used in discharge planning as needed.     TCN will continue to follow and collaborate with discharge planning team as additional post acute needs arise. Thank you.     Completed today:  PT/OT consult orders noted  Choice obtained:  IRF ([facility]), HH ([facility]) and DME ([facility]) as needed in discharge planning  SCP with PCP  "

## 2024-04-02 NOTE — PROGRESS NOTES
Received report via voalte from ED RN, Danielle. Patient was brought to floor, and was unable to tolerate movement, and stated she was in 10/10 pain. Tele monitoring in place, A&Ox4, and call light within reach.

## 2024-04-02 NOTE — ASSESSMENT & PLAN NOTE
Spent approx 5 mins on Tobacco cessation education . Discussed options of nicotine patch, medical treatment with wellbutrin and chantix. Discussed the benefits of quitting smoking and risks of continued smoking including cardiovascular disease, cancer and COPD.   Code 88942

## 2024-04-02 NOTE — THERAPY
Occupational Therapy Contact Note    Patient Name: Anu Manuel  Age:  68 y.o., Sex:  female  Medical Record #: [id number]  Today's Date: 4/2/2024 04/02/24 0201   Interdisciplinary Plan of Care Collaboration   Collaboration Comments OT orders received, pt in HD upon attempt to initiate eval, will hold and round back as able.

## 2024-04-02 NOTE — CONSULTS
68F GLF right inferior ramus fx  CT reviewed   No appreciable posterior injury  Stable fx  Recommend WBAT  No surgical intervention indicated      Guillermo Montes De Oca MD  Orthopedic Trauma Surgery

## 2024-04-02 NOTE — ASSESSMENT & PLAN NOTE
Admitted for pain control, physical therapy  Multimodal pain regimen  Fall precautions  Seen by ortho no katherine for intervention

## 2024-04-02 NOTE — ASSESSMENT & PLAN NOTE
Reportedly had low blood sugar on arrival  Her blood sugar remained significant elevated  I will resume her Lantus today  Monitor fingerstick closely  Plan to discharge in a.m. if her sugar remained stable  She follows with endocrinologist Dr. Marrero

## 2024-04-02 NOTE — CARE PLAN
The patient is Stable - Low risk of patient condition declining or worsening    Shift Goals  Clinical Goals: pain control, dialysis, mobility  Patient Goals: pain control  Family Goals: RILEY    Progress made toward(s) clinical / shift goals:    Problem: Psychosocial  Goal: Patient's level of anxiety will decrease  Description: Target End Date:  1-3 days or as soon as patient condition allows    1.  Collaborate with patient and family/caregiver to identify triggers and develop strategies to cope with anxiety  2.  Implement stimuli reduction, calming techniques  3.  Pharmacologic management per provider order  4.  Encourage patient/family/care giver participation  5.  Collaborate with interdisciplinary team including Psychologist or Behavioral Health Team as needed  Outcome: Progressing  Flowsheets  Taken 4/2/2024 1506 by Pastor Neves R.N.  Decrease Anxiety Level:   Collaborated with patient to identify and develop coping strategies   Implemented stimuli reduction, calming techniques   Encouraged support system participation  Taken 4/2/2024 0300 by Ingris Aguirre R.N.  Patient Behaviors:   Crying   Depressed   Anxious  Note: Pt currently calm at this time. Pt was medicated with an antianxiety medication. Pt has no concerns at this time.        Patient is not progressing towards the following goals:    Problem: Discharge Barriers/Planning  Goal: Patient's continuum of care needs are met  Description: Target End Date:  Prior to discharge or change in level of care    1.  Identify potential discharge barriers on admission and throughout hospitalization  2.  Collaborate with Case Management, , Clinical Educators, Navigators and others on the transitional care team to meet discharge needs  3.  Involve patient/family/caregivers in setting and prioritizing goals for hospitalization and discharge  4.  Ensure Flu vaccinations are addressed  5.  Inquire if patient is interested in the Meds to Bed  program  6.  Ensure patient and family/caregiver are able to demonstrate use of equipment as prescribed  7.  Ensure patient and family/caregiver can verbalize understanding of patient education  8.  Explain discharge instructions and medication reconciliation to patient and family/caregiver  Flowsheets (Taken 4/2/2024 1506)  Continuum of Care Needs:   Assessed for discharge barriers   Communicated discharge barriers to interdisciplinary tream   Involved patient/family/support system in discharge process   Collaborated with Case Management/  Note: Pt still needs to be seen by PT/OT for evaluation. Pt lives alone but has support from her brother.

## 2024-04-02 NOTE — PROGRESS NOTES
4 Eyes Skin Assessment Completed by MINI Amado and MINI Murillo.    Head WDL  Ears WDL  Nose WDL  Mouth WDL  Neck WDL  Breast/Chest WDL  Shoulder Blades WDL  Spine WDL  (R) Arm/Elbow/Hand Bruising and Scab  (L) Arm/Elbow/Hand Bruising and Scab  Abdomen WDL  Groin WDL  Scrotum/Coccyx/Buttocks Redness and Blanching  (R) Leg Redness and Blanching on knees and ankles  (L) Leg Redness and Blanching on knee and ankles  (R) Heel/Foot/Toe Redness, Blanching, and Boggy on heel  (L) Heel/Foot/Toe Redness, Blanching, and Boggy on heel          Devices In Places Tele Box and Nasal Cannula      Interventions In Place Gray Ear Foams    Possible Skin Injury No    Pictures Uploaded Into Epic No, not applicable  Wound Consult Placed N/A  RN Wound Prevention Protocol Ordered Yes

## 2024-04-02 NOTE — CONSULTS
"Nephrology Consultation    Date of Service  4/2/2024    Referring Physician  Inga Davila M.D.    Consulting Physician  Ankit Diggs M.D.    Reason for Consultation  Management of ESRD on HD      History of Presenting Illness  68 y.o. female with a history of gestational type 1 diabetes, hypertension, recent initiation of hemodialysis for ESRD on 3/21/2024 who presented 4/1/2024 with ground-level fall.    The patient says she was feeling lightheaded at home, fell over, and immediately felt pain in her pelvis.  She came to the hospital, and was found to have a pelvic fracture.  Patient is admitted for physical therapy, with no operative plans per orthopedic surgery.    Patient is seen and examined while receiving dialysis today.  She is tolerating dialysis with a goal ultrafiltration of 2 L.  She has a right IJ permacath.  Patient says she is interested in home dialysis, undecided between PD and home hemodialysis.  She currently denies chest pain, shortness of breath.    Review of Systems  Review of Systems   Constitutional:  Negative for fever.   Respiratory:  Negative for shortness of breath.    Cardiovascular:  Negative for chest pain.   Gastrointestinal:  Negative for abdominal pain.   All other systems reviewed and are negative.      Past Medical History  Past Medical History:   Diagnosis Date    Adverse effect of anesthesia     in 2008 \"throat closes up\"\"anxiety\" ?laryngospasm, kept in ICU. Pt states no problems currently 2018.     Anemia     Anesthesia     in 2008 \"throat closes up\"\"anxiety\" ?laryngospasm, kept in ICU. Pt states no problems currently 2018.     Arthritis     right shoulder, hands    Bowel habit changes More frequent    Cataract 2022    Cigarette smoker quit 2013    Dental disorder     missing teeth; Partial plate on top    depression 08/30/2016    denies depression, states has anxiety and panic attacks    Diabetes mellitus type 1 (HCC) 1989    insulin    Dialysis patient (HCC) Short time due to a " "kidney injury from a infection    Encounter for long-term (current) use of insulin (HCC) 09/25/2013    GI bleed     Heart burn     High cholesterol     Hypertension     Hypothyroidism, postsurgical 1970    Indigestion     Infectious disease      had hepatitis C, tested neg.    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) 06/10/2015       Surgical History  Past Surgical History:   Procedure Laterality Date    AR ERCP,DIAGNOSTIC N/A 01/14/2022    Procedure: ERCP, DIAGNOSTIC - WITH SIGMOID COLON BIOPSY AND STENT REMOVAL;  Surgeon: Cr Haro M.D.;  Location: [facility];  Service: Gastroenterology    THADDEUS BY LAPAROSCOPY N/A 10/28/2021    Procedure: CHOLECYSTECTOMY, LAPAROSCOPIC;  Surgeon: Tello Trammell M.D.;  Location: [facility];  Service: General    AR ERCP,DIAGNOSTIC N/A 10/26/2021    Procedure: ERCP (ENDOSCOPIC RETROGRADE CHOLANGIOPANCREATOGRAPHY);  Surgeon: Cr Haro M.D.;  Location: SURGERY SAME DAY [facility];  Service: Gastroenterology    AR UPPER GI ENDOSCOPY,BIOPSY N/A 10/26/2021    Procedure: GASTROSCOPY, WITH BIOPSY;  Surgeon: Cr Haro M.D.;  Location: SURGERY SAME DAY [facility];  Service: Gastroenterology    AR UPPER GI ENDOSCOPY,DIAGNOSIS N/A 10/26/2021    Procedure: GASTROSCOPY;  Surgeon: Cr Haro M.D.;  Location: SURGERY SAME DAY [facility];  Service: Gastroenterology    CATH PLACEMENT  01/25/2020    Procedure: INSERTION, CATHETER PERM;  Surgeon: Rola Mendoza M.D.;  Location: [facility];  Service: General    IRRIGATION & DEBRIDEMENT NEURO  01/19/2020    Procedure: IRRIGATION AND DEBRIDEMENT, WOUND THORACIC AND LUMBAR;  Surgeon: Ryan Roman M.D.;  Location: [facility];  Service: Neurosurgery    DE INS/RPLC SPI NPG/RCVR POCKET N/A 12/16/2019    Procedure: EXPLORATION AT THORACIC 8 - 9, REPLACEMENT OF  NEUROSTIMULATOR LEAD;  Surgeon: Ryan Roman M.D.;  Location: SURGERY [facility];  Service: Neurosurgery    SPINAL CORD STIMULATOR N/A 10/26/2018    Procedure: SPINAL CORD STIMULATOR;  Surgeon: Ryan Roman M.D.;  Location: SURGERY [facility];  Service: Neurosurgery    THORACIC LAMINECTOMY N/A 10/26/2018    Procedure: THORACIC LAMINECTOMY - FOR;  Surgeon: Ryan Roman M.D.;  Location: SURGERY [facility];  Service: Neurosurgery    DE NJX AA&/STRD TFRML EPI LUMBAR/SACRAL 1 LEVEL Right 08/31/2016    Procedure: INJ-FORAMEN EPI LUM/SAC SNGL L4-5;  Surgeon: Sukhi Godfrey M.D.;  Location: [facility];  Service: Pain Management    LUMBAR LAMINECTOMY DISKECTOMY Right 05/10/2016    Procedure: LUMBAR L4-5 HEMILAMINECTOMY DISKECTOMY ;  Surgeon: Arnold Keyes M.D.;  Location: [facility];  Service:     CERVICAL FUSION POSTERIOR  01/16/2009    Performed by TARA CONTRERAS at [facility]    HARDWARE REMOVAL NEURO  01/16/2009    Performed by TARA CONTRERAS at [facility]    NECK EXPLORATION  01/16/2009    Performed by TARA CONTRERAS at [facility]    SHOULDER ARTHROSCOPY W/ ROTATOR CUFF REPAIR  10/09/2008    Performed by SHERLY CASTANEDA at [facility]    SHOULDER DECOMPRESSION ARTHROSCOPIC  06/17/2008    Performed by SHERLY CASTANEDA at [facility]    CLAVICLE DISTAL EXCISION  06/17/2008    Performed by SHERLY CASTANEDA at [facility]    CERVICAL DISK AND FUSION ANTERIOR  03/12/2008    HYSTERECTOMY, VAGINAL  2006    APPENDECTOMY  2004    THYROID LOBECTOMY  1973    TONSILLECTOMY  1963    LUMPECTOMY  1976, 2005    Breast     LUMPECTOMY      OTHER ABDOMINAL SURGERY  2004    OTHER CARDIAC SURGERY  2020 stents inserted       Family History  Family History   Problem  Relation Age of Onset    Hypertension Mother     Cancer Father        Social History  Social History     Socioeconomic History    Marital status:      Spouse name: Not on file    Number of children: Not on file    Years of education: Not on file    Highest education level: Not on file   Occupational History    Not on file   Tobacco Use    Smoking status: Every Day     Current packs/day: 0.50     Average packs/day: 0.5 packs/day for 30.0 years (15.0 ttl pk-yrs)     Types: Cigarettes    Smokeless tobacco: Never   Vaping Use    Vaping Use: Never used   Substance and Sexual Activity    Alcohol use: Not Currently    Drug use: Not Currently     Types: Inhaled, Oral, Marijuana     Comment: Marijuana - Occasional; edibles    Sexual activity: Not Currently   Other Topics Concern    Not on file   Social History Narrative    Not on file     Social Determinants of Health     Financial Resource Strain: Low Risk  (11/1/2021)    Overall Financial Resource Strain (CARDIA)     Difficulty of Paying Living Expenses: Not very hard   Food Insecurity: No Food Insecurity (11/1/2021)    Hunger Vital Sign     Worried About Running Out of Food in the Last Year: Never true     Ran Out of Food in the Last Year: Never true   Transportation Needs: No Transportation Needs (11/1/2021)    PRAPARE - Transportation     Lack of Transportation (Medical): No     Lack of Transportation (Non-Medical): No   Physical Activity: Inactive (6/23/2020)    Exercise Vital Sign     Days of Exercise per Week: 0 days     Minutes of Exercise per Session: 0 min   Stress: No Stress Concern Present (6/23/2020)    [employer] [place] [employer] - Occupational Stress Questionnaire     Feeling of Stress : Not at all   Social Connections: Moderately Isolated (6/23/2020)    Social Connection and Isolation Panel [NHANES]     Frequency of Communication with Friends and Family: More than three times a week     Frequency of Social Gatherings with Friends and  Family: More than three times a week     Attends Voodoo Services: Never     Active Member of Clubs or Organizations: No     Attends Club or Organization Meetings: Never     Marital Status:    Intimate Partner Violence: Not At Risk (6/23/2020)    Humiliation, Afraid, Rape, and Kick questionnaire     Fear of Current or Ex-Partner: No     Emotionally Abused: No     Physically Abused: No     Sexually Abused: No   Housing Stability: Not on file       Medications  Current Facility-Administered Medications   Medication Dose Route Frequency Provider Last Rate Last Admin    ursodiol (Actigall) capsule 300 mg  300 mg Oral QAM Riley Marte M.D.   300 mg at 04/02/24 0629    And    ursodiol (Actigall) capsule 600 mg  600 mg Oral Q EVENING Riley Marte M.D.        nicotine (Nicoderm) 21 MG/24HR 21 mg  21 mg Transdermal Daily-0600 Joseph Cifuentes P.AChey-CChey   21 mg at 04/02/24 0556    And    nicotine polacrilex (Nicorette) 2 MG piece 2 mg  2 mg Oral Q HOUR PRN Joseph Cifuentes P.AChey-CChey   2 mg at 04/02/24 0055    NS (Bolus) 0.9 % infusion 250 mL  250 mL Intravenous DIALYSIS PRN Ankit Diggs M.D.        epoetin birdie (Epogen/Procrit) injection 4,000 Units  4,000 Units Intravenous TUE+THU+SAT Ankit Diggs M.D.   4,000 Units at 04/02/24 1333    heparin injection 3,600 Units  3,600 Units Intracatheter DIALYSIS PRN Ankit Diggs M.D.   3,600 Units at 04/02/24 1354    heparin injection 5,000 Units  5,000 Units Subcutaneous Q8HRS Riley Marte M.D.   5,000 Units at 04/02/24 1452    acetaminophen (Tylenol) tablet 650 mg  650 mg Oral Q6HRS PRN Riley Marte M.D.        senna-docusate (Pericolace Or Senokot S) 8.6-50 MG per tablet 2 Tablet  2 Tablet Oral Q EVENING Riley Marte M.D.   2 Tablet at 04/02/24 0053    And    polyethylene glycol/lytes (Miralax) Packet 1 Packet  1 Packet Oral QDAY PRN Riley Marte M.D.        Pharmacy Consult Request ...Pain Management Review 1 Each  1 Each Other PHARMACY TO DOSE Riley  DIPESH Marte        labetalol (Normodyne/Trandate) injection 10 mg  10 mg Intravenous Q4HRS PRN Riley Marte M.D.        ondansetron (Zofran) syringe/vial injection 4 mg  4 mg Intravenous Q4HRS PRN Riley Marte M.D.        ondansetron (Zofran ODT) dispertab 4 mg  4 mg Oral Q4HRS PRN Riley Marte M.D.   4 mg at 04/02/24 0645    albuterol inhaler 2 Puff  2 Puff Inhalation Q6HRS PRN Riley Marte M.D.   2 Puff at 04/02/24 0638    amLODIPine (Norvasc) tablet 5 mg  5 mg Oral DAILY Riley Marte M.D.   5 mg at 04/02/24 0628    atorvastatin (Lipitor) tablet 40 mg  40 mg Oral Nightly Riley Marte M.D.   40 mg at 04/02/24 0054    famotidine (Pepcid) tablet 20 mg  20 mg Oral DAILY Riley Marte M.D.   20 mg at 04/02/24 0628    hydrALAZINE (Apresoline) tablet 25 mg  25 mg Oral Q8HRS Riley Marte M.D.   25 mg at 04/02/24 1453    levothyroxine (Synthroid) tablet 250 mcg  250 mcg Oral AM ES Riley Marte M.D.   250 mcg at 04/02/24 0628    liothyronine (Cytomel) tablet 5 mcg  5 mcg Oral DAILY Riley Marte M.D.   5 mcg at 04/02/24 0629    metoprolol SR (Toprol XL) tablet 200 mg  200 mg Oral DAILY Riley Marte M.D.   200 mg at 04/02/24 0626    omeprazole (PriLOSEC) capsule 40 mg  40 mg Oral DAILY Riley Marte M.D.   40 mg at 04/02/24 0626    torsemide (Demadex) tablet 100 mg  100 mg Oral DAILY SCOT FranklinDChey   100 mg at 04/02/24 0636    traZODone (Desyrel) tablet 150 mg  150 mg Oral QHS Riley Marte M.D.   150 mg at 04/02/24 0054    umeclidinium-vilanterol (Anoro Ellipta) inhaler 1 Puff  1 Puff Inhalation DAILY Riley Marte M.D.   1 Puff at 04/02/24 0629    venlafaxine XR (Effexor XR) capsule 150 mg  150 mg Oral DAILY Riley Marte M.D.   150 mg at 04/02/24 0628    [START ON 4/8/2024] vitamin D2 (Ergocalciferol) (Drisdol) capsule 50,000 Units  50,000 Units Oral Q7 DAYS Riley Marte M.D.        insulin regular (HumuLIN R,NovoLIN R) injection  1-6  Units Subcutaneous 4X/DAY ACHS Riley Marte M.D.   1 Units at 04/02/24 0812    And    dextrose 10 % BOLUS 25 g  25 g Intravenous Q15 MIN PRN Riley Marte M.D.        oxyCODONE immediate-release (Roxicodone) tablet 5 mg  5 mg Oral Q3HRS PRN Riley Marte M.D.        Or    oxyCODONE immediate release (Roxicodone) tablet 10 mg  10 mg Oral Q3HRS PRN Riley Marte M.D.   10 mg at 04/02/24 1011    Or    HYDROmorphone (Dilaudid) injection 1 mg  1 mg Intravenous Q3HRS PRN Riley Marte M.D.   1 mg at 04/02/24 0556       Allergies  Allergies   Allergen Reactions    Tape Unspecified and Rash     Blisters, paper tape is ok  Other reaction(s): Rash       Physical Exam  Temp:  [36 °C (96.8 °F)-36.9 °C (98.4 °F)] 36.8 °C (98.2 °F)  Pulse:  [65-82] 73  Resp:  [12-19] 16  BP: ()/(58-82) 116/66  SpO2:  [92 %-100 %] 93 %    Physical Exam  Constitutional:       General: She is not in acute distress.     Appearance: She is well-developed.   HENT:      Head: Normocephalic and atraumatic.      Mouth/Throat:      Mouth: Mucous membranes are moist.      Pharynx: Oropharynx is clear. No oropharyngeal exudate.   Eyes:      General: No scleral icterus.     Extraocular Movements: Extraocular movements intact.   Neck:      Trachea: No tracheal deviation.   Cardiovascular:      Rate and Rhythm: Normal rate and regular rhythm.      Heart sounds: Normal heart sounds. No murmur heard.  Pulmonary:      Effort: Pulmonary effort is normal.      Breath sounds: No stridor. No rales.   Abdominal:      Palpations: Abdomen is soft.      Tenderness: There is no abdominal tenderness.   Musculoskeletal:         General: Normal range of motion.      Cervical back: Normal range of motion. No rigidity.      Right lower leg: Edema present.      Left lower leg: Edema present.   Skin:     General: Skin is warm and dry.   Neurological:      General: No focal deficit present.      Mental Status: She is alert and oriented to person, place,  and time.   Psychiatric:         Mood and Affect: Mood normal.         Behavior: Behavior normal.     Dialysis access: Right IJ permacath.    Fluids  Date 04/02/24 0700 - 04/03/24 0659   Shift 7355-9728 6629-8978 2575-5678 24 Hour Total   INTAKE   Dialysis 500   500   Shift Total 500   500   OUTPUT   Dialysis 2500   2500   Shift Total 2500   2500   Weight (kg) 71.8 71.8 71.8 71.8       Laboratory  Labs reviewed, pertinent labs below.  Recent Labs     04/01/24  1950 04/02/24  0158   WBC 8.4 7.4   RBC 2.87* 2.75*   HEMOGLOBIN 8.5* 7.9*   HEMATOCRIT 26.7* 25.3*   MCV 93.0 92.0   MCH 29.6 28.7   MCHC 31.8* 31.2*   RDW 57.0* 56.0*   PLATELETCT 255 200   MPV 10.8 10.3     Recent Labs     04/01/24 1950 04/02/24  0158   SODIUM 140 138   POTASSIUM 3.8 4.0   CHLORIDE 99 97   CO2 25 25   GLUCOSE 90 149*   BUN 23* 24*   CREATININE 3.02* 3.04*   CALCIUM 7.8* 7.5*                URINALYSIS:  Lab Results   Component Value Date/Time    COLORURINE DK Yellow 02/28/2024 0544    CLARITY Clear 02/28/2024 0544    SPECGRAVITY 1.021 02/28/2024 0544    PHURINE 5.5 02/28/2024 0544    KETONES Negative 02/28/2024 0544    PROTEINURIN 300 (A) 02/28/2024 0544    BILIRUBINUR Small (A) 02/28/2024 0544    UROBILU 1.0 02/28/2024 0544    NITRITE Negative 02/28/2024 0544    LEUKESTERAS Negative 02/28/2024 0544    OCCULTBLOOD Trace (A) 02/28/2024 0544     UPC  Lab Results   Component Value Date/Time    TOTPROTUR 557.0 (H) 02/28/2024 0544      Lab Results   Component Value Date/Time    CREATININEU 82.41 02/28/2024 0544       Imaging interpreted by radiologist. Imaging reports reviewed with pertinent findings below  CT-PELVIS W/O PLUS RECONS   Final Result      1.  Acute right inferior pubic ramus fracture      2.  No other fracture      3.  Lumbar spine facet arthropathy and levoconvex scoliosis      4.  Bilateral sacroiliac joint osteoarthritis         CT-HEAD W/O   Final Result      1.  No evidence of acute intracranial process.      2.   Periventricular chronic small vessel ischemic change.         MR-BRAIN-W/O    (Results Pending)         Assessment/Plan  68 y.o. female with a history of gestational type 1 diabetes, hypertension, recent initiation of hemodialysis for ESRD on 3/21/2024 who presented 4/1/2024 with ground-level fall.    1.  ESRD on hemodialysis Tuesday Thursday Saturday.  Oliguric.  Plan on dialysis today per usual schedule.  Avoid NSAIDs and other nephrotoxins.  Check renal function panel daily.    2.  Dialysis access: Right IJ permacath.  Patient possibly interested in home peritoneal dialysis.    3.  Ground-level fall and pubic ramus fracture.  No operative plans per orthopedic surgery.  Continue physical therapy.    4.  Anemia of chronic disease. Order retacrit thrice weekly with HD. Check CBC at least thrice weekly.  Patient has not received any outpatient Retacrit or Mircera.    5.  CKD-MBD.  Check phosphorus levels daily.  Recommend low phosphorus diet.    6.  Hypertension, labile.  Recommend ultrafiltration with dialysis as tolerated, as volume removal is the #1 treatment for hypertension in patients with end-stage renal disease.  Recommend low-sodium diet.      Ankit Diggs MD  Nephrology  [facility]

## 2024-04-02 NOTE — PROGRESS NOTES
"      Trauma tertiary survey completed. No additional injuries or concerns identified. Trauma service not following. Please reach out to primary team for questions or concerns.     Mental status adequate for full examination?: Yes    Spine cleared (radiologically and/or clinically): Yes    REVIEW OF SYSTEMS:  Review of Systems   Constitutional:  Positive for malaise/fatigue. Negative for chills and fever.   Respiratory:  Negative for shortness of breath.    Cardiovascular:  Negative for chest pain.   Gastrointestinal:  Negative for abdominal pain, nausea and vomiting.        BM PTA    Musculoskeletal:  Positive for myalgias. Negative for back pain and neck pain.        Right hip pain   Neurological:  Negative for tingling, sensory change, focal weakness, weakness and headaches.       PHYSICAL EXAMINATION:  BP 97/58   Pulse 75   Temp 36.7 °C (98.1 °F) (Temporal)   Resp 15   Ht 1.626 m (5' 4.02\")   Wt 71.8 kg (158 lb 4.6 oz)   LMP 04/29/2005 (LMP Unknown)   SpO2 92%   BMI 27.16 kg/m²   Physical Exam  Vitals and nursing note reviewed.   Constitutional:       General: She is sleeping. She is not in acute distress.     Interventions: Nasal cannula in place.   HENT:      Head: Normocephalic and atraumatic.      Nose: Nose normal.      Mouth/Throat:      Mouth: Mucous membranes are moist.   Eyes:      Extraocular Movements: Extraocular movements intact.      Pupils: Pupils are equal, round, and reactive to light.   Cardiovascular:      Rate and Rhythm: Normal rate.   Pulmonary:      Effort: Pulmonary effort is normal. No respiratory distress.   Abdominal:      General: There is no distension.      Palpations: Abdomen is soft.      Tenderness: There is no abdominal tenderness. There is no guarding.   Musculoskeletal:         General: Normal range of motion.      Cervical back: Normal range of motion and neck supple. No tenderness.      Comments: Moves all extremities   Right hip tenderness    Skin:     General: Skin " is warm and dry.      Comments: Scattered ecchymosis is various stages of healing    Neurological:      Mental Status: She is oriented to person, place, and time and easily aroused.      GCS: GCS eye subscore is 4. GCS verbal subscore is 5. GCS motor subscore is 6.         LABORATORY VALUES:  Recent Labs     04/01/24  1950 04/02/24  0158   WBC 8.4 7.4   RBC 2.87* 2.75*   HEMOGLOBIN 8.5* 7.9*   HEMATOCRIT 26.7* 25.3*   MCV 93.0 92.0   MCH 29.6 28.7   MCHC 31.8* 31.2*   RDW 57.0* 56.0*   PLATELETCT 255 200   MPV 10.8 10.3     Recent Labs     04/01/24  1950 04/02/24  0158   SODIUM 140 138   POTASSIUM 3.8 4.0   CHLORIDE 99 97   CO2 25 25   GLUCOSE 90 149*   BUN 23* 24*   CREATININE 3.02* 3.04*   CALCIUM 7.8* 7.5*     Recent Labs     04/01/24  1950 04/02/24  0158   ASTSGOT 46* 41   ALTSGPT 28 25   TBILIRUBIN 0.4 0.3   ALKPHOSPHAT 776* 687*   GLOBULIN 4.8* 4.4*           IMAGING:  CT-PELVIS W/O PLUS RECONS   Final Result      1.  Acute right inferior pubic ramus fracture      2.  No other fracture      3.  Lumbar spine facet arthropathy and levoconvex scoliosis      4.  Bilateral sacroiliac joint osteoarthritis         CT-HEAD W/O   Final Result      1.  No evidence of acute intracranial process.      2.  Periventricular chronic small vessel ischemic change.         MR-BRAIN-W/O    (Results Pending)       RAP Score Total: 3      CAGE Results: negative Blood Alcohol>0.08: no       All current laboratory studies/radiology exams reviewed: Yes    Medications reconciliation has been reviewed: Yes    Completed Consultations:  Guillermo Montes De Oca MD, Orthopedic surgery      Pending Consultations:  None     Newly identified diagnoses, injuries and/or co-morbidities:  None     Discussed patient condition with RN, Patient, and trauma surgery.

## 2024-04-02 NOTE — PROGRESS NOTES
3hr HD started @ 1050 and completed @ 1351,tx well tolerated,VSS,net UF = 2000ml-adjusted per bp tolerance.RIJ TDC dressing changed,CDI.Report given to Pastor Neves RN,awaiting for transport .

## 2024-04-02 NOTE — PROGRESS NOTES
"While completing the MRI form with pt. Pt states she is unable to complete MRI's due to her anxiety and needs to be \"knocked out,\" according to pt she attempted a MRI last week. Dr Davila notified.   "

## 2024-04-02 NOTE — ED NOTES
Bedside report from MINI Angela. Pt A & O x 4, 10 :10 right hip pain. Placed on 1.5 L/ O2 (baseline) NC.On cardiac monitor/ auto BP. Call light within reach.

## 2024-04-02 NOTE — CARE PLAN
The patient is Stable - Low risk of patient condition declining or worsening    Shift Goals  Clinical Goals: Pain management; promote mobility  Patient Goals: pain management  Family Goals: RILEY    Progress made toward(s) clinical / shift goals:    Problem: Fall Risk  Goal: Patient will remain free from falls  Outcome: Progressing     Problem: Skin Integrity  Goal: Skin integrity is maintained or improved  Outcome: Progressing     Problem: Psychosocial  Goal: Patient's level of anxiety will decrease  Outcome: Progressing     Problem: Hemodynamics  Goal: Patient's hemodynamics, fluid balance and neurologic status will be stable or improve  Outcome: Progressing     Problem: Respiratory  Goal: Patient will achieve/maintain optimum respiratory ventilation and gas exchange  Outcome: Progressing     Problem: Fluid Volume  Goal: Fluid volume balance will be maintained  Outcome: Progressing     Problem: Nutrition  Goal: Patient's nutritional and fluid intake will be adequate or improve  Outcome: Progressing       Patient is not progressing towards the following goals:

## 2024-04-02 NOTE — ED PROVIDER NOTES
ED Provider Note    CHIEF COMPLAINT  Chief Complaint   Patient presents with    GLF     TBI       EXTERNAL RECORDS REVIEWED  Outpatient Notes nephrology note on 2/14/2024 for stage IV CKD    HPI/ROS  LIMITATION TO HISTORY   Select: : None  OUTSIDE HISTORIAN(S):  EMS transported the patient for a syncopal episode patient fell on her head and her right hip    Anu Manuel is a 68 y.o. female who presents with head trauma and right hip trauma after ground-level fall.  Patient reportedly stood up from sitting and syncopized.  Patient reports this has happened frequently lately after starting dialysis.  Patient denies chest pain, shortness of breath.  Patient denies neck or back pain.  Patient does have chronic pain but there is no worsening symptoms than normal.  Patient denies unilateral weakness or numbness.    PAST MEDICAL HISTORY   has a past medical history of Adverse effect of anesthesia, Anemia, Anesthesia, Arthritis, Bowel habit changes (More frequent), Cataract (2022), Cigarette smoker (quit 2013), Dental disorder, depression (08/30/2016), Diabetes mellitus type 1 (HCC) (1989), Dialysis patient (HCC) (Short time due to a kidney injury from a infection), Encounter for long-term (current) use of insulin (HCC) (09/25/2013), GI bleed, Heart burn, High cholesterol, Hypertension, Hypothyroidism, postsurgical (1970), Indigestion, Infectious disease, Jaundice (2021 Liver disease), Joint replacement, Liver disease, Liver failure (HCC), Pain, Polyneuropathy in diabetes(357.2) (06/10/2015), Status post appendectomy, and 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    SURGICAL HISTORY   has a past surgical history that includes hysterectomy, vaginal (2006); thyroid lobectomy (1973); lumpectomy (1976, 2005); cervical disk and fusion anterior (03/12/2008); tonsillectomy (1963); cervical fusion posterior (01/16/2009); hardware removal neuro  "(01/16/2009); neck exploration (01/16/2009); lumpectomy; lumbar laminectomy diskectomy (Right, 05/10/2016); shoulder decompression arthroscopic (06/17/2008); clavicle distal excision (06/17/2008); shoulder arthroscopy w/ rotator cuff repair (10/09/2008); njx aa&/strd tfrml epi lumbar/sacral 1 level (Right, 08/31/2016); spinal cord stimulator (N/A, 10/26/2018); thoracic laminectomy (N/A, 10/26/2018); appendectomy (2004); ins/rplc spi npg/rcvr pocket (N/A, 12/16/2019); irrigation & debridement neuro (01/19/2020); cath placement (01/25/2020); ercp,diagnostic (N/A, 10/26/2021); upper gi endoscopy,biopsy (N/A, 10/26/2021); upper gi endoscopy,diagnosis (N/A, 10/26/2021); peter by laparoscopy (N/A, 10/28/2021); ercp,diagnostic (N/A, 01/14/2022); other cardiac surgery (2020 stents inserted); and other abdominal surgery (2004).    FAMILY HISTORY  Family History   Problem Relation Age of Onset    Hypertension Mother     Cancer Father        SOCIAL HISTORY  Social History     Tobacco Use    Smoking status: Every Day     Current packs/day: 0.50     Average packs/day: 0.5 packs/day for 30.0 years (15.0 ttl pk-yrs)     Types: Cigarettes    Smokeless tobacco: Never   Vaping Use    Vaping Use: Never used   Substance and Sexual Activity    Alcohol use: Not Currently    Drug use: Not Currently     Types: Inhaled, Oral, Marijuana     Comment: Marijuana - Occasional; edibles    Sexual activity: Not Currently       CURRENT MEDICATIONS  Home Medications    **Home medications have not yet been reviewed for this encounter**         ALLERGIES  Allergies   Allergen Reactions    Tape Unspecified and Rash     Blisters, paper tape is ok  Other reaction(s): Rash       PHYSICAL EXAM  VITAL SIGNS: /69   Pulse 70   Temp 36.9 °C (98.4 °F) (Temporal)   Resp 14   Ht 1.626 m (5' 4\")   Wt 73.5 kg (162 lb)   LMP 04/29/2005 (LMP Unknown)   SpO2 99%   BMI 27.81 kg/m²    Vitals and nursing note reviewed.   Constitutional:       Comments: " Patient is lying in bed supine, pleasant, conversant, speaking in complete sentences   HENT:   Tenderness to palpation without lacerations or palpable skull fracture to the left occiput  Eyes:      Extraocular Movements: Extraocular movements intact.      Conjunctiva/sclera: Conjunctivae normal.      Pupils: Pupils are equal, round, and reactive to light.   Cardiovascular:      Pulses: Normal pulses.      Comments: HR 70  Pulmonary:      Effort: Pulmonary effort is normal. No respiratory distress.   Abdominal:      Comments: Abdomen is soft, non-tender, non-distended, non-rigid, no rebound, guarding, masses, no McBurney's point tenderness, no peritoneal signs, negative Rovsing sign, negative Cifuentes sign.  No CVA tenderness to palpation. Benign abdomen.   Musculoskeletal:    No midline C/T/L-spine tenderness to palpation, step-offs or deformities  Exquisite right hip tenderness to palpation. Distal affected extremity demonstrates intact motor, cap refill less than 2 seconds and 2+ pulses, warm and well perfused, no signs of tendon rupture, no crepitus on palpation.     General: No swelling. Normal range of motion.      Cervical back: Normal range of motion. No rigidity.   Skin:     General: Skin is warm and dry.      Capillary Refill: Capillary refill takes less than 2 seconds.   Neurological:      Mental Status: Alert.       EKG/LABS  Results for orders placed or performed during the hospital encounter of 04/01/24   CBC WITH DIFFERENTIAL   Result Value Ref Range    WBC 8.4 4.8 - 10.8 K/uL    RBC 2.87 (L) 4.20 - 5.40 M/uL    Hemoglobin 8.5 (L) 12.0 - 16.0 g/dL    Hematocrit 26.7 (L) 37.0 - 47.0 %    MCV 93.0 81.4 - 97.8 fL    MCH 29.6 27.0 - 33.0 pg    MCHC 31.8 (L) 32.2 - 35.5 g/dL    RDW 57.0 (H) 35.9 - 50.0 fL    Platelet Count 255 164 - 446 K/uL    MPV 10.8 9.0 - 12.9 fL    Neutrophils-Polys 77.70 (H) 44.00 - 72.00 %    Lymphocytes 10.00 (L) 22.00 - 41.00 %    Monocytes 8.40 0.00 - 13.40 %    Eosinophils 2.30  0.00 - 6.90 %    Basophils 1.20 0.00 - 1.80 %    Immature Granulocytes 0.40 0.00 - 0.90 %    Nucleated RBC 0.00 0.00 - 0.20 /100 WBC    Neutrophils (Absolute) 6.51 1.82 - 7.42 K/uL    Lymphs (Absolute) 0.84 (L) 1.00 - 4.80 K/uL    Monos (Absolute) 0.70 0.00 - 0.85 K/uL    Eos (Absolute) 0.19 0.00 - 0.51 K/uL    Baso (Absolute) 0.10 0.00 - 0.12 K/uL    Immature Granulocytes (abs) 0.03 0.00 - 0.11 K/uL    NRBC (Absolute) 0.00 K/uL   Comp Metabolic Panel   Result Value Ref Range    Sodium 140 135 - 145 mmol/L    Potassium 3.8 3.6 - 5.5 mmol/L    Chloride 99 96 - 112 mmol/L    Co2 25 20 - 33 mmol/L    Anion Gap 16.0 7.0 - 16.0    Glucose 90 65 - 99 mg/dL    Bun 23 (H) 8 - 22 mg/dL    Creatinine 3.02 (H) 0.50 - 1.40 mg/dL    Calcium 7.8 (L) 8.5 - 10.5 mg/dL    Correct Calcium 8.4 (L) 8.5 - 10.5 mg/dL    AST(SGOT) 46 (H) 12 - 45 U/L    ALT(SGPT) 28 2 - 50 U/L    Alkaline Phosphatase 776 (H) 30 - 99 U/L    Total Bilirubin 0.4 0.1 - 1.5 mg/dL    Albumin 3.2 3.2 - 4.9 g/dL    Total Protein 8.0 6.0 - 8.2 g/dL    Globulin 4.8 (H) 1.9 - 3.5 g/dL    A-G Ratio 0.7 g/dL   ESTIMATED GFR   Result Value Ref Range    GFR (CKD-EPI) 16 (A) >60 mL/min/1.73 m 2   EKG   Result Value Ref Range    Report       [facility] Emergency Dept.    Test Date:  2024  Pt Name:    KALPANA BUTTS               Department: ER  MRN:        [id number]                      Room:       RD 02  Gender:     Female                       Technician: [id number]  :        [id number]                   Requested By:MI BOWLES  Order #:    [id number]                    Reading MD: Mi Bowles MD    Measurements  Intervals                                Axis  Rate:       64                           P:          66  NM:         147                          QRS:        60  QRSD:       95                           T:          0  QT:         414  QTc:        427    Interpretive Statements  Sinus rhythm  Low voltage, extremity  leads  Normal axis  No ST elevations or ST depressions  T wave inversions in leads I and II  Electronically Signed On 04- 21:28:11 PDT by Manuel Bowles MD     POCT glucose device results   Result Value Ref Range    POC Glucose, Blood 83 65 - 99 mg/dL     I have independently interpreted this EKG    RADIOLOGY  I have independently interpreted the diagnostic imaging associated with this visit and am waiting the final reading from the radiologist.   My preliminary interpretation is as follows: Pubic ramus fracture    Radiologist interpretation:  CT-PELVIS W/O PLUS RECONS   Final Result      1.  Acute right inferior pubic ramus fracture      2.  No other fracture      3.  Lumbar spine facet arthropathy and levoconvex scoliosis      4.  Bilateral sacroiliac joint osteoarthritis         CT-HEAD W/O   Final Result      1.  No evidence of acute intracranial process.      2.  Periventricular chronic small vessel ischemic change.             COURSE & MEDICAL DECISION MAKING    ASSESSMENT, COURSE AND PLAN  Care Narrative: CBC to evaluate for acute anemia and leukocytosis.  CMP to evaluate for acute electrolyte abnormality, acute kidney injury, acute liver failure or dysfunction.  CT pelvis to evaluate for fracture versus dislocation CT of the head to evaluate for intracranial hemorrhage or skull fracture.  EKG to evaluate for arrhythmia causing syncope episode.  However patient history is most consistent with orthostatic hypotension especially in the context of recent dialysis.  Disposition pending workup.    Electronically signed by: Manuel Bowles M.D., 4/1/2024 7:34 PM    Labs demonstrate chronic anemia, elevated creatinine consistent with ESRD.  Right inferior pubic ramus fracture on CT pelvis.  CT head without acute intracranial abnormality.  Dr. Montes De Oca of orthopedic surgery was consulted and reports that this is nonoperative in terms of the pelvic fracture however patient is unable to ambulate or move  her right lower extremity, I do believe she requires pain management and likely rehab placement.    This dictation has been created using voice recognition software. I am continuously working with the software to minimize the number of voice recognition errors and I have made every attempt to manually correct the errors within my dictation. However errors  related to this voice recognition software may still exist and should be interpreted within the appropriate context.     Electronically signed by: Manuel Bowles M.D., 4/1/2024 10:12 PM      DISPOSITION AND DISCUSSIONS  I have discussed management of the patient with the following physicians and MILAN's: Dr. Montes De Oca    FINAL DIAGNOSIS  1. Intractable pain    2. Orthostatic syncope    3. T wave inversion in EKG    4. Closed fracture of ramus of right pubis, initial encounter ([facility])    5. Fall from ground level    6. Chronic anemia    7. ESRD on hemodialysis ([facility])           Electronically signed by: Manuel Bowles M.D., 4/1/2024 7:29 PM

## 2024-04-02 NOTE — PROGRESS NOTES
Received bedside report from RN, pt care assumed, VSS, pt assessment complete. Pt AAOx4, with 3/10 of pain at this time, pt previously medicated per MAR. No signs of acute distress noted at this time. Plan of care discussed with pt and verbalizes no questions. Pt denies any additional needs at this time. Bed locked/in lowest position, bed alarm refused by pt, pt educated on fall risk and verbalized understanding, call light within reach, hourly rounding initiated.

## 2024-04-02 NOTE — PROGRESS NOTES
Hospital Medicine Daily Progress Note    Date of Service  4/2/2024    Chief Complaint  Anu Manuel is a 68 y.o. female admitted 4/1/2024 with F    Hospital Course  This is a 68 y.o. female with history of of gestational disease, smoking, diabetes type 1, hypothyroidism, hypertension, who presented 4/1/2024 with complaints of right hip pain, after she fell.  She has been having syncopal and near syncopal episode and orthostatic position after she started on hemodialysis.  Today she passed out when she stands up quickly, fell on the right side.  Upon evaluation she found to have right inferior pubic ramus fracture.  Per orthopedic surger recommended pain control and physical therapy. No plan for any intervention.     Interval Problem Update  Patient see and examined still with some pain , afebrile, she also has ESRD so I have consulted nephrology her dialysis schedule is T/Th/Sat  No more syncope episode   Close monitoring on tele   I have discussed this patient's plan of care and discharge plan at IDT rounds today with Case Management, Nursing, Nursing leadership, and other members of the IDT team.    Consultants/Specialty  nephrology and orthopedics    Code Status  DNAR/DNI    Disposition  The patient is not medically cleared for discharge to home or a post-acute facility.      I have placed the appropriate orders for post-discharge needs.    Review of Systems  Review of Systems   Constitutional:  Negative for chills, fever and weight loss.   HENT:  Negative for ear pain, hearing loss and tinnitus.    Eyes:  Negative for blurred vision, double vision and photophobia.   Respiratory:  Negative for cough, hemoptysis and sputum production.    Cardiovascular:  Negative for chest pain, palpitations and orthopnea.   Gastrointestinal:  Negative for heartburn, nausea and vomiting.   Genitourinary:  Negative for dysuria, flank pain, frequency and hematuria.   Musculoskeletal:  Positive for joint pain. Negative  for back pain and neck pain.   Skin:  Negative for itching and rash.   Neurological:  Negative for tremors, speech change, focal weakness and headaches.   Endo/Heme/Allergies:  Negative for environmental allergies and polydipsia. Does not bruise/bleed easily.   Psychiatric/Behavioral:  Negative for hallucinations and substance abuse. The patient is not nervous/anxious.         Physical Exam  Temp:  [36 °C (96.8 °F)-36.9 °C (98.4 °F)] 36.8 °C (98.2 °F)  Pulse:  [65-82] 73  Resp:  [12-19] 16  BP: ()/(58-82) 116/66  SpO2:  [92 %-100 %] 93 %    Physical Exam  Vitals and nursing note reviewed.   Constitutional:       General: She is not in acute distress.     Appearance: Normal appearance.   HENT:      Head: Normocephalic and atraumatic.      Nose: Nose normal.      Mouth/Throat:      Mouth: Mucous membranes are moist.   Eyes:      Extraocular Movements: Extraocular movements intact.      Pupils: Pupils are equal, round, and reactive to light.   Cardiovascular:      Rate and Rhythm: Normal rate and regular rhythm.   Pulmonary:      Effort: Pulmonary effort is normal.      Breath sounds: Normal breath sounds.   Abdominal:      General: Abdomen is flat. There is no distension.      Tenderness: There is abdominal tenderness in the right lower quadrant. There is no guarding or rebound.   Musculoskeletal:         General: No swelling or deformity. Normal range of motion.      Cervical back: Normal range of motion and neck supple.   Skin:     General: Skin is warm and dry.   Neurological:      General: No focal deficit present.      Mental Status: She is alert and oriented to person, place, and time.   Psychiatric:         Mood and Affect: Mood normal.         Behavior: Behavior normal.         Fluids    Intake/Output Summary (Last 24 hours) at 4/2/2024 1610  Last data filed at 4/2/2024 1351  Gross per 24 hour   Intake 500 ml   Output 2500 ml   Net -2000 ml       Laboratory  Recent Labs     04/01/24  1950 04/02/24  0158    WBC 8.4 7.4   RBC 2.87* 2.75*   HEMOGLOBIN 8.5* 7.9*   HEMATOCRIT 26.7* 25.3*   MCV 93.0 92.0   MCH 29.6 28.7   MCHC 31.8* 31.2*   RDW 57.0* 56.0*   PLATELETCT 255 200   MPV 10.8 10.3     Recent Labs     04/01/24  1950 04/02/24  0158   SODIUM 140 138   POTASSIUM 3.8 4.0   CHLORIDE 99 97   CO2 25 25   GLUCOSE 90 149*   BUN 23* 24*   CREATININE 3.02* 3.04*   CALCIUM 7.8* 7.5*                   Imaging  CT-PELVIS W/O PLUS RECONS   Final Result      1.  Acute right inferior pubic ramus fracture      2.  No other fracture      3.  Lumbar spine facet arthropathy and levoconvex scoliosis      4.  Bilateral sacroiliac joint osteoarthritis         CT-HEAD W/O   Final Result      1.  No evidence of acute intracranial process.      2.  Periventricular chronic small vessel ischemic change.         MR-BRAIN-W/O    (Results Pending)        Assessment/Plan  * Right inferior pubic rami fracture- (present on admission)  Assessment & Plan  Admitted for pain control, physical therapy  Multimodal pain regimen  Fall precautions  Seen by ortho no katherine for intervention       Syncope  Assessment & Plan  Patient presented with recurrent syncope and orthostatic position, when she stands up started recently after dialysis started.  Differential may include autonomic dysfunction, volumes fluctuations due to dialysis   recent echo in March showed worsening of right intraventricular pressure, RVSP 61 with EF 60%  Plan to monitor on telemetry, orthostatic vitals, Occupational Therapy        ESRD needing dialysis (HCC)- (present on admission)  Assessment & Plan  On hemodialysis Tuesday Thursday Saturday.  Due for hemodialysis tomorrow.    I have consulted nephrology today to cont on dialysis   Appreciate rec.     Chronic obstructive pulmonary disease  Assessment & Plan  Not in exacerbation  Albuterol as needed    Hypothyroidism- (present on admission)  Assessment & Plan  Resume levothyroxine    Type 1 diabetes mellitus on insulin therapy (HCC)-  (present on admission)  Assessment & Plan  Patient's fasting blood sugar today is 90  We will hold Lantus, continue insulin sliding scale and hypoglycemia protocol    Cigarette smoker- (present on admission)  Assessment & Plan  Spent approx 5 mins on Tobacco cessation education . Discussed options of nicotine patch, medical treatment with wellbutrin and chantix. Discussed the benefits of quitting smoking and risks of continued smoking including cardiovascular disease, cancer and COPD.   Code 98112           VTE prophylaxis: heparin ppx    Greater than 51 minutes spent prepping to see patient (e.g. review of tests) obtaining and/or reviewing separately obtained history. Performing a medically appropriate examination and/ evaluation.  Counseling and educating the patient/family/caregiver.  Ordering medications, tests, or procedures.  Referring and communicating with other health care professionals.  Documenting clinical information in EPIC.  Independently interpreting results and communicating results to patient/family/caregiver.  Care coordination.      I have performed a physical exam and reviewed and updated ROS and Plan today (4/2/2024). In review of yesterday's note (4/1/2024), there are no changes except as documented above.

## 2024-04-02 NOTE — H&P
Hospital Medicine History & Physical Note    Date of Service  4/1/2024    Primary Care Physician  Jarrell Yates M.D.        Code Status  DNAR/DNI    Chief Complaint  Chief Complaint   Patient presents with    GLF     TBI       History of Presenting Illness  Anu Manuel is a 68 y.o. female with history of of gestational disease, smoking, diabetes type 1, hypothyroidism, hypertension, who presented 4/1/2024 with complaints of right hip pain, after she fell.  She has been having syncopal and near syncopal episode and orthostatic position after she started on hemodialysis.  Today she passed out when she stands up quickly, fell on the right side.  Upon evaluation she found to have right inferior pubic ramus fracture.  Per orthopedic surgery who consulted with ERP, recommended pain control and physical therapy.  No plan for any intervention.    I discussed the plan of care with patient and ERP .    Review of Systems  Review of Systems   Constitutional:  Negative for chills, fever and weight loss.   HENT:  Negative for ear pain, hearing loss and tinnitus.    Eyes:  Negative for blurred vision, double vision and photophobia.   Respiratory:  Negative for cough, hemoptysis and sputum production.    Cardiovascular:  Negative for chest pain, palpitations and orthopnea.   Gastrointestinal:  Negative for heartburn, nausea and vomiting.   Genitourinary:  Negative for dysuria, flank pain, frequency and hematuria.   Musculoskeletal:  Positive for joint pain. Negative for back pain and neck pain.   Skin:  Negative for itching and rash.   Neurological:  Negative for tremors, speech change, focal weakness and headaches.   Endo/Heme/Allergies:  Negative for environmental allergies and polydipsia. Does not bruise/bleed easily.   Psychiatric/Behavioral:  Negative for hallucinations and substance abuse. The patient is not nervous/anxious.        Past Medical History   has a past medical history of Adverse effect of anesthesia,  Anemia, Anesthesia, Arthritis, Bowel habit changes (More frequent), Cataract (2022), Cigarette smoker (quit 2013), Dental disorder, depression (08/30/2016), Diabetes mellitus type 1 (HCC) (1989), Dialysis patient (HCC) (Short time due to a kidney injury from a infection), Encounter for long-term (current) use of insulin (HCC) (09/25/2013), GI bleed, Heart burn, High cholesterol, Hypertension, Hypothyroidism, postsurgical (1970), Indigestion, Infectious disease, Jaundice (2021 Liver disease), Joint replacement, Liver disease, Liver failure (HCC), Pain, Polyneuropathy in diabetes(357.2) (06/10/2015), Status post appendectomy, and 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    Surgical History   has a past surgical history that includes hysterectomy, vaginal (2006); thyroid lobectomy (1973); lumpectomy (1976, 2005); cervical disk and fusion anterior (03/12/2008); tonsillectomy (1963); cervical fusion posterior (01/16/2009); hardware removal neuro (01/16/2009); neck exploration (01/16/2009); lumpectomy; lumbar laminectomy diskectomy (Right, 05/10/2016); shoulder decompression arthroscopic (06/17/2008); clavicle distal excision (06/17/2008); shoulder arthroscopy w/ rotator cuff repair (10/09/2008); pr njx aa&/strd tfrml epi lumbar/sacral 1 level (Right, 08/31/2016); spinal cord stimulator (N/A, 10/26/2018); thoracic laminectomy (N/A, 10/26/2018); appendectomy (2004); pr ins/rplc spi npg/rcvr pocket (N/A, 12/16/2019); irrigation & debridement neuro (01/19/2020); cath placement (01/25/2020); pr ercp,diagnostic (N/A, 10/26/2021); pr upper gi endoscopy,biopsy (N/A, 10/26/2021); pr upper gi endoscopy,diagnosis (N/A, 10/26/2021); peter by laparoscopy (N/A, 10/28/2021); pr ercp,diagnostic (N/A, 01/14/2022); other cardiac surgery (2020 stents inserted); and other abdominal surgery (2004).     Family History  family history includes Cancer in her father;  Hypertension in her mother.   Family history reviewed with patient. There is no family history that is pertinent to the chief complaint.     Social History   reports that she has been smoking cigarettes. She has a 15 pack-year smoking history. She has never used smokeless tobacco. She reports that she does not currently use alcohol. She reports that she does not currently use drugs after having used the following drugs: Inhaled, Oral, and Marijuana.    Allergies  Allergies   Allergen Reactions    Tape Unspecified and Rash     Blisters, paper tape is ok  Other reaction(s): Rash       Medications  Prior to Admission Medications   Prescriptions Last Dose Informant Patient Reported? Taking?   Cyanocobalamin (VITAMIN B-12 PO)  Patient Yes No   Sig: Take 1 Tablet by mouth every day.   VITAMIN D, ERGOCALCIFEROL, PO  Patient Yes No   Sig: Take 1 Tablet by mouth 2 times a day.   albuterol 108 (90 Base) MCG/ACT Aero Soln inhalation aerosol  Patient Yes No   Sig: Inhale 2 Puffs every 6 hours as needed for Shortness of Breath.   amLODIPine (NORVASC) 5 MG Tab  Patient Yes No   Sig: Take 5 mg by mouth every day.   atorvastatin (LIPITOR) 40 MG Tab  Patient No No   Sig: Take 1 Tablet by mouth every evening.   famotidine (PEPCID) 20 MG Tab  Patient No No   Sig: TAKE 1 TABLET BY MOUTH TWICE A DAY   Patient taking differently: Take 20 mg by mouth 2 times a day.   fenofibrate (TRICOR) 48 MG Tab  Patient Yes No   Sig: Take 48 mg by mouth every morning.   hydrALAZINE (APRESOLINE) 25 MG Tab   No No   Sig: Take 1 Tablet by mouth every 8 hours.   insulin glargine (LANTUS SOLOSTAR) 100 UNIT/ML Solution Pen-injector injection   No No   Sig: Inject 35 Units under the skin every evening.   levothyroxine (SYNTHROID) 125 MCG Tab   No No   Sig: Take 2 Tablets by mouth every morning on an empty stomach. 2 tablets=250mg DAW1   liothyronine (CYTOMEL) 5 MCG Tab  Patient No No   Sig: Take 1 Tablet by mouth every day.   metoprolol SR (TOPROL XL) 50 MG  TABLET SR 24 HR  Patient Yes No   Sig: Take 200 mg by mouth every day. 4 tablets=200mg   omeprazole (PRILOSEC) 40 MG delayed-release capsule  Patient Yes No   Sig: Take 40 mg by mouth every day.   ondansetron (ZOFRAN ODT) 8 MG TABLET DISPERSIBLE  Patient Yes No   Sig: Take 8 mg by mouth every 8 hours as needed for Nausea.   oxycodone (OXY-IR) 15 MG immediate release tablet  Patient Yes No   Sig: Take 15 mg by mouth every four hours as needed for Severe Pain.   senna-docusate (SENOKOT S) 8.6-50 MG Tab  Patient Yes No   Sig: Take 1 Tablet by mouth every day.   torsemide (DEMADEX) 100 MG Tab   No No   Sig: Take 1 Tablet by mouth every day.   traZODone (DESYREL) 150 MG Tab  Patient Yes No   Sig: Take 150 mg by mouth at bedtime.   umeclidinium-vilanterol (ANORO ELLIPTA) 62.5-25 MCG/ACT AEROSOL POWDER, BREATH ACTIVATED inhaler  Patient No No   Sig: Inhale 1 Puff every day.   ursodiol (ACTIGALL) 300 MG Cap  Patient Yes No   Sig: Take 300-600 mg by mouth 2 times a day. 1 capsule qam & 2 caps qpm   venlafaxine (EFFEXOR-XR) 150 MG extended-release capsule  Patient Yes No   Sig: Take 150 mg by mouth every day.      Facility-Administered Medications: None       Physical Exam  Temp:  [36.9 °C (98.4 °F)] 36.9 °C (98.4 °F)  Pulse:  [65-70] 70  Resp:  [13-18] 14  BP: ()/(59-80) 139/69  SpO2:  [96 %-100 %] 99 %  Blood Pressure : 139/69   Temperature: 36.9 °C (98.4 °F)   Pulse: 70   Respiration: 14   Pulse Oximetry: 99 %       Physical Exam  Vitals and nursing note reviewed.   Constitutional:       General: She is not in acute distress.     Appearance: Normal appearance.   HENT:      Head: Normocephalic and atraumatic.      Nose: Nose normal.      Mouth/Throat:      Mouth: Mucous membranes are moist.   Eyes:      Extraocular Movements: Extraocular movements intact.      Pupils: Pupils are equal, round, and reactive to light.   Cardiovascular:      Rate and Rhythm: Normal rate and regular rhythm.   Pulmonary:      Effort:  "Pulmonary effort is normal.      Breath sounds: Normal breath sounds.   Abdominal:      General: Abdomen is flat. There is no distension.      Tenderness: There is abdominal tenderness in the right lower quadrant. There is no guarding or rebound.   Musculoskeletal:         General: No swelling or deformity. Normal range of motion.      Cervical back: Normal range of motion and neck supple.   Skin:     General: Skin is warm and dry.   Neurological:      General: No focal deficit present.      Mental Status: She is alert and oriented to person, place, and time.   Psychiatric:         Mood and Affect: Mood normal.         Behavior: Behavior normal.         Laboratory:  Recent Labs     04/01/24 1950   WBC 8.4   RBC 2.87*   HEMOGLOBIN 8.5*   HEMATOCRIT 26.7*   MCV 93.0   MCH 29.6   MCHC 31.8*   RDW 57.0*   PLATELETCT 255   MPV 10.8     Recent Labs     04/01/24 1950   SODIUM 140   POTASSIUM 3.8   CHLORIDE 99   CO2 25   GLUCOSE 90   BUN 23*   CREATININE 3.02*   CALCIUM 7.8*     Recent Labs     04/01/24 1950   ALTSGPT 28   ASTSGOT 46*   ALKPHOSPHAT 776*   TBILIRUBIN 0.4   GLUCOSE 90         No results for input(s): \"NTPROBNP\" in the last 72 hours.      No results for input(s): \"TROPONINT\" in the last 72 hours.    Imaging:  CT-PELVIS W/O PLUS RECONS   Final Result      1.  Acute right inferior pubic ramus fracture      2.  No other fracture      3.  Lumbar spine facet arthropathy and levoconvex scoliosis      4.  Bilateral sacroiliac joint osteoarthritis         CT-HEAD W/O   Final Result      1.  No evidence of acute intracranial process.      2.  Periventricular chronic small vessel ischemic change.             X-Ray:  I have personally reviewed the images and compared with prior images.    Assessment/Plan:  Justification for Admission Status  I anticipate this patient will require at least two midnights for appropriate medical management, necessitating inpatient admission because significant pain and debility following " fracture requiring at least 2 midnights.    Patient will need a Telemetry bed on MEDICAL service .  The need is secondary to syncope.    * Right inferior pubic rami fracture- (present on admission)  Assessment & Plan  Admitted for pain control, physical therapy  Multimodal pain regimen  Fall precautions      ESRD needing dialysis (HCC)- (present on admission)  Assessment & Plan  On hemodialysis Tuesday Thursday Saturday.  Due for hemodialysis tomorrow.  Plan to consult nephrology    Chronic obstructive pulmonary disease  Assessment & Plan  Not in exacerbation  Albuterol as needed    Hypothyroidism- (present on admission)  Assessment & Plan  Resume levothyroxine    Type 1 diabetes mellitus on insulin therapy (HCC)- (present on admission)  Assessment & Plan  Patient's fasting blood sugar today is 90  We will hold Lantus, continue insulin sliding scale and hypoglycemia protocol    Cigarette smoker- (present on admission)  Assessment & Plan  Spent approx 5 mins on Tobacco cessation education . Discussed options of nicotine patch, medical treatment with wellbutrin and chantix. Discussed the benefits of quitting smoking and risks of continued smoking including cardiovascular disease, cancer and COPD.   Code 96857          VTE prophylaxis: heparin ppx

## 2024-04-02 NOTE — ED NOTES
Pharmacy Medication Reconciliation      ~Medication reconciliation updated and complete per patient AND historically. Pt states she only took morning medications, but unable to identify each one  ~Allergies have been verified and updated   ~No oral ABX within the last 30 days  ~Patient home pharmacy :  [facility]      ~Anticoagulants (rivaroxaban, apixaban, edoxaban, dabigatran, warfarin, enoxaparin) taken in the last 14 days? NO

## 2024-04-02 NOTE — ED TRIAGE NOTES
".  Chief Complaint   Patient presents with    GLF     TBI       68 yr patient BIB [facility] from home to [facility] for above complaint. Per EMS patient felt dizzy and had a syncope fall with + head strike, -thinner,     Pt  Transported to CT then [facility] 2Patient and staff wearing appropriate PPE    /80   Pulse 66   Temp 36.9 °C (98.4 °F) (Temporal)   Resp 18   Ht 1.626 m (5' 4\")   Wt 73.5 kg (162 lb)   LMP 04/29/2005 (LMP Unknown)   SpO2 100%   BMI 27.81 kg/m²    "

## 2024-04-02 NOTE — ASSESSMENT & PLAN NOTE
Patient presented with recurrent syncope and orthostatic position, when she stands up started recently after dialysis started.  Differential may include autonomic dysfunction, volumes fluctuations due to dialysis   recent echo in March showed worsening of right intraventricular pressure, RVSP 61 with EF 60%  Plan to monitor on telemetry, orthostatic vitals, Occupational Therapy     4/4/2024  Remained asymptomatic  No event on telemetry

## 2024-04-03 LAB
ANION GAP SERPL CALC-SCNC: 10 MMOL/L (ref 7–16)
BUN SERPL-MCNC: 18 MG/DL (ref 8–22)
CALCIUM SERPL-MCNC: 7.9 MG/DL (ref 8.5–10.5)
CHLORIDE SERPL-SCNC: 95 MMOL/L (ref 96–112)
CO2 SERPL-SCNC: 29 MMOL/L (ref 20–33)
CREAT SERPL-MCNC: 2.43 MG/DL (ref 0.5–1.4)
ERYTHROCYTE [DISTWIDTH] IN BLOOD BY AUTOMATED COUNT: 56.8 FL (ref 35.9–50)
GFR SERPLBLD CREATININE-BSD FMLA CKD-EPI: 21 ML/MIN/1.73 M 2
GLUCOSE BLD STRIP.AUTO-MCNC: 310 MG/DL (ref 65–99)
GLUCOSE BLD STRIP.AUTO-MCNC: 370 MG/DL (ref 65–99)
GLUCOSE BLD STRIP.AUTO-MCNC: 439 MG/DL (ref 65–99)
GLUCOSE BLD STRIP.AUTO-MCNC: 446 MG/DL (ref 65–99)
GLUCOSE SERPL-MCNC: 304 MG/DL (ref 65–99)
HCT VFR BLD AUTO: 26.5 % (ref 37–47)
HGB BLD-MCNC: 8.4 G/DL (ref 12–16)
MCH RBC QN AUTO: 29.3 PG (ref 27–33)
MCHC RBC AUTO-ENTMCNC: 31.7 G/DL (ref 32.2–35.5)
MCV RBC AUTO: 92.3 FL (ref 81.4–97.8)
PLATELET # BLD AUTO: 209 K/UL (ref 164–446)
PMV BLD AUTO: 10.5 FL (ref 9–12.9)
POTASSIUM SERPL-SCNC: 4.6 MMOL/L (ref 3.6–5.5)
RBC # BLD AUTO: 2.87 M/UL (ref 4.2–5.4)
SODIUM SERPL-SCNC: 134 MMOL/L (ref 135–145)
T4 FREE SERPL-MCNC: 0.92 NG/DL (ref 0.93–1.7)
TSH SERPL DL<=0.005 MIU/L-ACNC: 63.8 UIU/ML (ref 0.38–5.33)
WBC # BLD AUTO: 7.4 K/UL (ref 4.8–10.8)

## 2024-04-03 PROCEDURE — 700102 HCHG RX REV CODE 250 W/ 637 OVERRIDE(OP)

## 2024-04-03 PROCEDURE — 700102 HCHG RX REV CODE 250 W/ 637 OVERRIDE(OP): Performed by: INTERNAL MEDICINE

## 2024-04-03 PROCEDURE — 770006 HCHG ROOM/CARE - MED/SURG/GYN SEMI*

## 2024-04-03 PROCEDURE — 99223 1ST HOSP IP/OBS HIGH 75: CPT | Mod: 25 | Performed by: PHYSICAL MEDICINE & REHABILITATION

## 2024-04-03 PROCEDURE — 97166 OT EVAL MOD COMPLEX 45 MIN: CPT

## 2024-04-03 PROCEDURE — 700102 HCHG RX REV CODE 250 W/ 637 OVERRIDE(OP): Performed by: PHYSICAL MEDICINE & REHABILITATION

## 2024-04-03 PROCEDURE — 99232 SBSQ HOSP IP/OBS MODERATE 35: CPT | Performed by: INTERNAL MEDICINE

## 2024-04-03 PROCEDURE — A9270 NON-COVERED ITEM OR SERVICE: HCPCS

## 2024-04-03 PROCEDURE — A9270 NON-COVERED ITEM OR SERVICE: HCPCS | Performed by: PHYSICAL MEDICINE & REHABILITATION

## 2024-04-03 PROCEDURE — 97162 PT EVAL MOD COMPLEX 30 MIN: CPT

## 2024-04-03 PROCEDURE — 84443 ASSAY THYROID STIM HORMONE: CPT

## 2024-04-03 PROCEDURE — 97535 SELF CARE MNGMENT TRAINING: CPT

## 2024-04-03 PROCEDURE — A9270 NON-COVERED ITEM OR SERVICE: HCPCS | Performed by: INTERNAL MEDICINE

## 2024-04-03 PROCEDURE — 99406 BEHAV CHNG SMOKING 3-10 MIN: CPT | Performed by: PHYSICAL MEDICINE & REHABILITATION

## 2024-04-03 PROCEDURE — 84439 ASSAY OF FREE THYROXINE: CPT

## 2024-04-03 PROCEDURE — 700111 HCHG RX REV CODE 636 W/ 250 OVERRIDE (IP): Performed by: INTERNAL MEDICINE

## 2024-04-03 PROCEDURE — 80048 BASIC METABOLIC PNL TOTAL CA: CPT

## 2024-04-03 PROCEDURE — 36415 COLL VENOUS BLD VENIPUNCTURE: CPT

## 2024-04-03 PROCEDURE — 85027 COMPLETE CBC AUTOMATED: CPT

## 2024-04-03 PROCEDURE — 82962 GLUCOSE BLOOD TEST: CPT | Mod: 91

## 2024-04-03 RX ORDER — MECLIZINE HYDROCHLORIDE 25 MG/1
25 TABLET ORAL 3 TIMES DAILY
Status: DISCONTINUED | OUTPATIENT
Start: 2024-04-03 | End: 2024-04-05 | Stop reason: HOSPADM

## 2024-04-03 RX ORDER — LORAZEPAM 0.5 MG/1
0.5 TABLET ORAL EVERY 6 HOURS PRN
Status: DISCONTINUED | OUTPATIENT
Start: 2024-04-03 | End: 2024-04-05 | Stop reason: HOSPADM

## 2024-04-03 RX ORDER — LEVOTHYROXINE SODIUM 0.15 MG/1
300 TABLET ORAL
Status: DISCONTINUED | OUTPATIENT
Start: 2024-04-04 | End: 2024-04-05 | Stop reason: HOSPADM

## 2024-04-03 RX ADMIN — INSULIN HUMAN 6 UNITS: 100 INJECTION, SOLUTION PARENTERAL at 11:48

## 2024-04-03 RX ADMIN — UMECLIDINIUM BROMIDE AND VILANTEROL TRIFENATATE 1 PUFF: 62.5; 25 POWDER RESPIRATORY (INHALATION) at 05:04

## 2024-04-03 RX ADMIN — HYDROMORPHONE HYDROCHLORIDE 1 MG: 1 INJECTION, SOLUTION INTRAMUSCULAR; INTRAVENOUS; SUBCUTANEOUS at 22:38

## 2024-04-03 RX ADMIN — FAMOTIDINE 20 MG: 20 TABLET, FILM COATED ORAL at 05:03

## 2024-04-03 RX ADMIN — TRAZODONE HYDROCHLORIDE 150 MG: 100 TABLET ORAL at 21:02

## 2024-04-03 RX ADMIN — LEVOTHYROXINE SODIUM 250 MCG: 0.12 TABLET ORAL at 05:03

## 2024-04-03 RX ADMIN — MECLIZINE HYDROCHLORIDE 25 MG: 25 TABLET ORAL at 15:40

## 2024-04-03 RX ADMIN — ATORVASTATIN CALCIUM 40 MG: 40 TABLET, FILM COATED ORAL at 21:02

## 2024-04-03 RX ADMIN — DOCUSATE SODIUM 50 MG AND SENNOSIDES 8.6 MG 2 TABLET: 8.6; 5 TABLET, FILM COATED ORAL at 17:14

## 2024-04-03 RX ADMIN — URSODIOL 600 MG: 300 CAPSULE ORAL at 17:15

## 2024-04-03 RX ADMIN — OXYCODONE HYDROCHLORIDE 10 MG: 10 TABLET ORAL at 04:51

## 2024-04-03 RX ADMIN — VENLAFAXINE HYDROCHLORIDE 150 MG: 75 CAPSULE, EXTENDED RELEASE ORAL at 05:02

## 2024-04-03 RX ADMIN — INSULIN HUMAN 4 UNITS: 100 INJECTION, SOLUTION PARENTERAL at 07:45

## 2024-04-03 RX ADMIN — OMEPRAZOLE 40 MG: 20 CAPSULE, DELAYED RELEASE ORAL at 05:03

## 2024-04-03 RX ADMIN — HYDROMORPHONE HYDROCHLORIDE 1 MG: 1 INJECTION, SOLUTION INTRAMUSCULAR; INTRAVENOUS; SUBCUTANEOUS at 15:04

## 2024-04-03 RX ADMIN — TORSEMIDE 100 MG: 100 TABLET ORAL at 05:05

## 2024-04-03 RX ADMIN — OXYCODONE HYDROCHLORIDE 10 MG: 10 TABLET ORAL at 21:12

## 2024-04-03 RX ADMIN — INSULIN HUMAN 5 UNITS: 100 INJECTION, SOLUTION PARENTERAL at 21:08

## 2024-04-03 RX ADMIN — HYDRALAZINE HYDROCHLORIDE 25 MG: 25 TABLET ORAL at 05:03

## 2024-04-03 RX ADMIN — HEPARIN SODIUM 5000 UNITS: 5000 INJECTION, SOLUTION INTRAVENOUS; SUBCUTANEOUS at 13:51

## 2024-04-03 RX ADMIN — AMLODIPINE BESYLATE 5 MG: 5 TABLET ORAL at 05:03

## 2024-04-03 RX ADMIN — HYDRALAZINE HYDROCHLORIDE 25 MG: 25 TABLET ORAL at 13:51

## 2024-04-03 RX ADMIN — INSULIN HUMAN 6 UNITS: 100 INJECTION, SOLUTION PARENTERAL at 17:15

## 2024-04-03 RX ADMIN — LORAZEPAM 0.5 MG: 0.5 TABLET ORAL at 05:15

## 2024-04-03 RX ADMIN — URSODIOL 300 MG: 300 CAPSULE ORAL at 05:06

## 2024-04-03 RX ADMIN — OXYCODONE HYDROCHLORIDE 10 MG: 10 TABLET ORAL at 14:01

## 2024-04-03 RX ADMIN — NICOTINE TRANSDERMAL SYSTEM 21 MG: 21 PATCH, EXTENDED RELEASE TRANSDERMAL at 04:54

## 2024-04-03 RX ADMIN — MECLIZINE HYDROCHLORIDE 25 MG: 25 TABLET ORAL at 21:02

## 2024-04-03 RX ADMIN — HYDRALAZINE HYDROCHLORIDE 25 MG: 25 TABLET ORAL at 21:02

## 2024-04-03 RX ADMIN — METOPROLOL SUCCINATE 200 MG: 50 TABLET, FILM COATED, EXTENDED RELEASE ORAL at 05:05

## 2024-04-03 RX ADMIN — LIOTHYRONINE SODIUM 5 MCG: 5 TABLET ORAL at 05:07

## 2024-04-03 ASSESSMENT — ENCOUNTER SYMPTOMS
NAUSEA: 0
WEIGHT LOSS: 0
BRUISES/BLEEDS EASILY: 0
ABDOMINAL PAIN: 0
BLURRED VISION: 0
PALPITATIONS: 0
POLYDIPSIA: 0
HEADACHES: 0
NECK PAIN: 0
SHORTNESS OF BREATH: 0
HEARTBURN: 0
HEMOPTYSIS: 0
CHILLS: 0
NERVOUS/ANXIOUS: 0
SPEECH CHANGE: 0
PHOTOPHOBIA: 0
COUGH: 0
FOCAL WEAKNESS: 0
VOMITING: 0
TREMORS: 0
BACK PAIN: 0
HALLUCINATIONS: 0
FEVER: 0
DOUBLE VISION: 0
FLANK PAIN: 0
SPUTUM PRODUCTION: 0
ORTHOPNEA: 0

## 2024-04-03 ASSESSMENT — COGNITIVE AND FUNCTIONAL STATUS - GENERAL
SUGGESTED CMS G CODE MODIFIER MOBILITY: CK
DRESSING REGULAR LOWER BODY CLOTHING: TOTAL
TOILETING: A LITTLE
DAILY ACTIVITIY SCORE: 19
EATING MEALS: A LITTLE
SUGGESTED CMS G CODE MODIFIER DAILY ACTIVITY: CM
DRESSING REGULAR LOWER BODY CLOTHING: A LITTLE
DRESSING REGULAR UPPER BODY CLOTHING: TOTAL
DRESSING REGULAR UPPER BODY CLOTHING: A LITTLE
MOBILITY SCORE: 17
MOVING FROM LYING ON BACK TO SITTING ON SIDE OF FLAT BED: A LITTLE
MOVING FROM LYING ON BACK TO SITTING ON SIDE OF FLAT BED: TOTAL
MOVING TO AND FROM BED TO CHAIR: TOTAL
WALKING IN HOSPITAL ROOM: A LITTLE
PERSONAL GROOMING: A LITTLE
SUGGESTED CMS G CODE MODIFIER MOBILITY: CN
TURNING FROM BACK TO SIDE WHILE IN FLAT BAD: TOTAL
CLIMB 3 TO 5 STEPS WITH RAILING: A LOT
PERSONAL GROOMING: TOTAL
WALKING IN HOSPITAL ROOM: TOTAL
SUGGESTED CMS G CODE MODIFIER DAILY ACTIVITY: CK
HELP NEEDED FOR BATHING: A LITTLE
DAILY ACTIVITIY SCORE: 8
HELP NEEDED FOR BATHING: TOTAL
CLIMB 3 TO 5 STEPS WITH RAILING: TOTAL
MOBILITY SCORE: 6
TURNING FROM BACK TO SIDE WHILE IN FLAT BAD: A LITTLE
STANDING UP FROM CHAIR USING ARMS: A LITTLE
STANDING UP FROM CHAIR USING ARMS: TOTAL
MOVING TO AND FROM BED TO CHAIR: A LITTLE
TOILETING: TOTAL

## 2024-04-03 ASSESSMENT — LIFESTYLE VARIABLES: SUBSTANCE_ABUSE: 0

## 2024-04-03 ASSESSMENT — PAIN DESCRIPTION - PAIN TYPE
TYPE: ACUTE PAIN

## 2024-04-03 ASSESSMENT — GAIT ASSESSMENTS
DEVIATION: ANTALGIC
GAIT LEVEL OF ASSIST: MINIMAL ASSIST
ASSISTIVE DEVICE: FRONT WHEEL WALKER
DISTANCE (FEET): 15

## 2024-04-03 ASSESSMENT — FIBROSIS 4 INDEX: FIB4 SCORE: 2.59

## 2024-04-03 ASSESSMENT — ACTIVITIES OF DAILY LIVING (ADL): TOILETING: INDEPENDENT

## 2024-04-03 NOTE — PROGRESS NOTES
Monitor Summary:    Rhythm: Sinus rhythm  Rate: 68 - 75  Ectopy: occasional PVC's, (R) Trigeminy  Measurements: .13/.08/.44

## 2024-04-03 NOTE — PROGRESS NOTES
[facility] Medicine Daily Progress Note    Date of Service  4/3/2024    Chief Complaint  Anu Manuel is a 68 y.o. female admitted 4/1/2024 with F    Hospital Course  This is a 68 y.o. female with history of of gestational disease, smoking, diabetes type 1, hypothyroidism, hypertension, who presented 4/1/2024 with complaints of right hip pain, after she fell.  She has been having syncopal and near syncopal episode and orthostatic position after she started on hemodialysis.  Today she passed out when she stands up quickly, fell on the right side.  Upon evaluation she found to have right inferior pubic ramus fracture.  Per orthopedic surger recommended pain control and physical therapy. No plan for any intervention.     Interval Problem Update  Patient see and examined still with some pain , afebrile, she also has ESRD so I have consulted nephrology her dialysis schedule is T/Th/Sat  No more syncope episode   Close monitoring on tele   4/3: Patient seen and examined, afebrile, no nausea or vomiting, pain management of her pelvic fracture.  Seen by physical therapy and will need placement     I have discussed this patient's plan of care and discharge plan at IDT rounds today with Case Management, Nursing, Nursing leadership, and other members of the IDT team.    Consultants/Specialty  nephrology and orthopedics    Code Status  DNAR/DNI    Disposition  The patient is not medically cleared for discharge to home or a post-acute facility.      I have placed the appropriate orders for post-discharge needs.    Review of Systems  Review of Systems   Constitutional:  Negative for chills, fever and weight loss.   HENT:  Negative for ear pain, hearing loss and tinnitus.    Eyes:  Negative for blurred vision, double vision and photophobia.   Respiratory:  Negative for cough, hemoptysis and sputum production.    Cardiovascular:  Negative for chest pain, palpitations and orthopnea.   Gastrointestinal:  Negative for  heartburn, nausea and vomiting.   Genitourinary:  Negative for dysuria, flank pain, frequency and hematuria.   Musculoskeletal:  Positive for joint pain. Negative for back pain and neck pain.   Skin:  Negative for itching and rash.   Neurological:  Negative for tremors, speech change, focal weakness and headaches.   Endo/Heme/Allergies:  Negative for environmental allergies and polydipsia. Does not bruise/bleed easily.   Psychiatric/Behavioral:  Negative for hallucinations and substance abuse. The patient is not nervous/anxious.         Physical Exam  Temp:  [36.4 °C (97.5 °F)-36.8 °C (98.2 °F)] 36.7 °C (98.1 °F)  Pulse:  [64-86] 70  Resp:  [16-18] 16  BP: (116-138)/(64-73) 129/64  SpO2:  [92 %-98 %] 92 %    Physical Exam  Vitals and nursing note reviewed.   Constitutional:       General: She is not in acute distress.     Appearance: Normal appearance.   HENT:      Head: Normocephalic and atraumatic.      Nose: Nose normal.      Mouth/Throat:      Mouth: Mucous membranes are moist.   Eyes:      Extraocular Movements: Extraocular movements intact.      Pupils: Pupils are equal, round, and reactive to light.   Cardiovascular:      Rate and Rhythm: Normal rate and regular rhythm.   Pulmonary:      Effort: Pulmonary effort is normal.      Breath sounds: Normal breath sounds.   Abdominal:      General: Abdomen is flat. There is no distension.      Tenderness: There is abdominal tenderness in the right lower quadrant. There is no guarding or rebound.   Musculoskeletal:         General: No swelling or deformity. Normal range of motion.      Cervical back: Normal range of motion and neck supple.   Skin:     General: Skin is warm and dry.   Neurological:      General: No focal deficit present.      Mental Status: She is alert and oriented to person, place, and time.   Psychiatric:         Mood and Affect: Mood normal.         Behavior: Behavior normal.         Fluids    Intake/Output Summary (Last 24 hours) at 4/3/2024  1448  Last data filed at 4/3/2024 1300  Gross per 24 hour   Intake 222 ml   Output 0 ml   Net 222 ml       Laboratory  Recent Labs     04/01/24  1950 04/02/24 0158 04/03/24  0153   WBC 8.4 7.4 7.4   RBC 2.87* 2.75* 2.87*   HEMOGLOBIN 8.5* 7.9* 8.4*   HEMATOCRIT 26.7* 25.3* 26.5*   MCV 93.0 92.0 92.3   MCH 29.6 28.7 29.3   MCHC 31.8* 31.2* 31.7*   RDW 57.0* 56.0* 56.8*   PLATELETCT 255 200 209   MPV 10.8 10.3 10.5     Recent Labs     04/01/24  1950 04/02/24 0158 04/03/24  0153   SODIUM 140 138 134*   POTASSIUM 3.8 4.0 4.6   CHLORIDE 99 97 95*   CO2 25 25 29   GLUCOSE 90 149* 304*   BUN 23* 24* 18   CREATININE 3.02* 3.04* 2.43*   CALCIUM 7.8* 7.5* 7.9*                   Imaging  CT-PELVIS W/O PLUS RECONS   Final Result      1.  Acute right inferior pubic ramus fracture      2.  No other fracture      3.  Lumbar spine facet arthropathy and levoconvex scoliosis      4.  Bilateral sacroiliac joint osteoarthritis         CT-HEAD W/O   Final Result      1.  No evidence of acute intracranial process.      2.  Periventricular chronic small vessel ischemic change.              Assessment/Plan  * Right inferior pubic rami fracture- (present on admission)  Assessment & Plan  Admitted for pain control, physical therapy  Multimodal pain regimen  Fall precautions  Seen by ortho no katherine for intervention       Syncope  Assessment & Plan  Patient presented with recurrent syncope and orthostatic position, when she stands up started recently after dialysis started.  Differential may include autonomic dysfunction, volumes fluctuations due to dialysis   recent echo in March showed worsening of right intraventricular pressure, RVSP 61 with EF 60%  Plan to monitor on telemetry, orthostatic vitals, Occupational Therapy        ESRD needing dialysis (HCC)- (present on admission)  Assessment & Plan  On hemodialysis Tuesday Thursday Saturday.  Due for hemodialysis tomorrow.    I have consulted nephrology today to cont on dialysis   Appreciate  rec.     Chronic obstructive pulmonary disease  Assessment & Plan  Not in exacerbation  Albuterol as needed    Hypothyroidism- (present on admission)  Assessment & Plan  Resume levothyroxine    Type 1 diabetes mellitus on insulin therapy (HCC)- (present on admission)  Assessment & Plan  Patient's fasting blood sugar today is 90  We will hold Lantus, continue insulin sliding scale and hypoglycemia protocol    Cigarette smoker- (present on admission)  Assessment & Plan  Spent approx 5 mins on Tobacco cessation education . Discussed options of nicotine patch, medical treatment with wellbutrin and chantix. Discussed the benefits of quitting smoking and risks of continued smoking including cardiovascular disease, cancer and COPD.   Code 70560           VTE prophylaxis: heparin ppx    Greater than 51 minutes spent prepping to see patient (e.g. review of tests) obtaining and/or reviewing separately obtained history. Performing a medically appropriate examination and/ evaluation.  Counseling and educating the patient/family/caregiver.  Ordering medications, tests, or procedures.  Referring and communicating with other health care professionals.  Documenting clinical information in EPIC.  Independently interpreting results and communicating results to patient/family/caregiver.  Care coordination.      I have performed a physical exam and reviewed and updated ROS and Plan today (4/3/2024). In review of yesterday's note (4/2/2024), there are no changes except as documented above.

## 2024-04-03 NOTE — DISCHARGE PLANNING
Discussed pt during IDT rounds. Pt is medically cleared. NEELAM informed MD of pt's preference for [facility] . Per MD, he will place PMR referral. Pt has SNF acceptances. [facility]  has been made aware of pt. Pending PT/OT notes.    @ 1204: NEELAM made aware by LAYLA Leahy that pt does not need three midnight stay for pt to dc to a SNF.
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
## 2024-04-03 NOTE — DISCHARGE PLANNING
[facility] Transitional Care Coordination    Referral from: MACIEL Mcmillan    Insurance Provider on Facesheet: SCP    Potential Rehab Diagnosis: Other Ortho    Chart review indicates patient may have on going medical management and may have therapy needs to possibly meet inpatient rehab facility criteria with the goal of returning to community.    D/C support will need to be verified: Daughter    Physiatry consultation forwarded per protocol.      Thank you for the referral.

## 2024-04-03 NOTE — THERAPY
"Occupational Therapy   Initial Evaluation     Patient Name: Anu Manuel  Age:  68 y.o., Sex:  female  Medical Record #: [id number]  Today's Date: 4/3/2024     Precautions  Precautions: Fall Risk, Weight Bearing As Tolerated Right Lower Extremity    Assessment    Patient is 68 y.o. female admitted after a GLF causing a R inferior pubic ramus fx that is being managed non-op. Other pertinent medical history includes gestational disease, smoking, DM, hypothyroidism, ESRD on dialysis, and HTN. Pt seen for OT evaluation and treatment. Pt donned/doffed socks with min A, stood to wash hands with CGA, performed toilet transfer w/ min A, and toilet hygiene w/ min A. Pt fatigued after walking back from the bathroom requiring a seated rest break. Pt lives alone at baseline and has support from two brother's and a close friend. Pt educated regarding the role of OT, AE for LB dressing, and post acute placement. Pt current functional performance limited by pain, impaired activity tolerance, impaired balance, and generalized weakness. Pt will benefit from skilled OT while admitted to acute care.     Plan    Occupational Therapy Initial Treatment Plan   Treatment Interventions: Self Care / Activities of Daily Living, Adaptive Equipment, Neuro Re-Education / Balance, Therapeutic Exercises, Therapeutic Activity  Treatment Frequency: 3 Times per Week  Duration: Until Therapy Goals Met    DC Equipment Recommendations: Unable to determine at this time  Discharge Recommendations: Recommend post-acute placement for additional occupational therapy services prior to discharge home     Subjective    \"I didn't know this walker had brakes,\" pt stated in reference to 4WW.      Objective     04/03/24 0954   Prior Living Situation   Prior Services None   Housing / Facility 1 Story House   Steps Into Home   (2 in front, 3 in back)   Bathroom Set up Shower Chair;Bathtub / Shower Combination   Equipment Owned 4-Wheel Walker;Tub / Shower Seat "   Lives with - Patient's Self Care Capacity Alone and Able to Care For Self   Comments Pt lives alone. Pt reported that she has support from a close friend and a brother who lives nearby. Pt's other brother is coming to town to assist as needed.   Prior Level of ADL Function   Self Feeding Independent   Grooming / Hygiene Independent   Bathing Independent   Dressing Independent   Toileting Independent   Prior Level of IADL Function   Medication Management Independent   Laundry Independent   Kitchen Mobility Independent   Finances Independent   Home Management Independent   Shopping Requires Assist   Prior Level Of Mobility Independent With Device in Community;Independent With Device in Home  (sometimes uses 4WW)   Driving / Transportation Relatives / Others Provide Transportation  (uses Uber or gets rides from her brother)   History of Falls   History of Falls Yes   Date of Last Fall   (Pt reported two falls prior to admission related to dizziness.)   Precautions   Precautions Fall Risk;Weight Bearing As Tolerated Right Lower Extremity   Vitals   Vitals Comments remained stable throughout   Pain   Pain Scales 0 to 10 Scale    Pain 0 - 10 Group   Therapist Pain Assessment During Activity;Nurse Notified  (not rated, agreeable to session, R hip pain)   Non Verbal Descriptors   Non Verbal Scale  Calm   Cognition    Cognition / Consciousness WDL   Level of Consciousness Alert   Comments Pleasant and cooperative, anxious   Passive ROM Upper Body   Passive ROM Upper Body WDL   Active ROM Upper Body   Active ROM Upper Body  WDL   Comments from observation   Strength Upper Body   Upper Body Strength  WDL   Comments from observation   Sensation Upper Body   Upper Extremity Sensation  WDL   Upper Body Muscle Tone   Upper Body Muscle Tone  WDL   Coordination Upper Body   Coordination WDL   Comments from observation   Balance Assessment   Sitting Balance (Static) Fair   Sitting Balance (Dynamic) Fair -   Standing Balance  (Static) Fair -   Standing Balance (Dynamic) Poor +   Weight Shift Sitting Fair   Weight Shift Standing Poor   Comments w/ FWW   Bed Mobility    Supine to Sit Standby Assist   Sit to Supine   (up to chair post)   Scooting Standby Assist   Rolling Standby Assist   Comments extra time required, from slighlty elevated bed due to intolerance of flat bed   ADL Assessment   Grooming Contact Guard Assist;Standing  (washed hands at sink)   Lower Body Dressing Minimal Assist  (don socks, introduced sock aid)   Toileting Minimal Assist  (urine on toilet)   Functional Mobility   Sit to Stand Contact Guard Assist   Bed, Chair, Wheelchair Transfer Contact Guard Assist   Toilet Transfers Minimal Assist   Transfer Method Stand Step   Mobility EOB>Bathroom>chair for rest due to fatigue>stand>chair   Comments w/ FWW, pt fatigued quickly, initially used 4WW-pt did not know how to lock brakes   Visual Perception   Visual Perception  Not Tested   Activity Tolerance   Sitting in Chair up to chair post   Sitting Edge of Bed <5 min   Standing >5 min   Comments fatigued on walk back from bathroom requiring seated rest break, limited by weakness/pain   Patient / Family Goals   Patient / Family Goal #1 to go home   Short Term Goals   Short Term Goal # 1 Pt will perform LB dressing w/ supv and AE PRN   Short Term Goal # 2 Pt will perform ADL transfer w/ supv   Short Term Goal # 3 Pt will perform standing g/h w/ supv   Education Group   Education Provided Role of Occupational Therapist;Activities of Daily Living;Adaptive Equipment   Role of Occupational Therapist Patient Response Patient;Acceptance;Explanation;Verbal Demonstration   ADL Patient Response Patient;Acceptance;Explanation;Demonstration;Action Demonstration;Verbal Demonstration   Adaptive Equipment Patient Response Patient;Acceptance;Explanation;Demonstration;Verbal Demonstration;Action Demonstration   Occupational Therapy Initial Treatment Plan    Treatment Interventions Self Care  / Activities of Daily Living;Adaptive Equipment;Neuro Re-Education / Balance;Therapeutic Exercises;Therapeutic Activity   Treatment Frequency 3 Times per Week   Duration Until Therapy Goals Met   Problem List   Problem List Decreased Active Daily Living Skills;Decreased Homemaking Skills;Decreased Functional Mobility;Decreased Activity Tolerance;Impaired Postural Control / Balance

## 2024-04-03 NOTE — PROGRESS NOTES
Pt care assumed at 0645. Bedside report received from night shift RN. SMILEY. Pt A&Ox4. Pt resting comfortably in bed. Bed locked and in lowest position. Call bell and belongings in reach.
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
## 2024-04-03 NOTE — PROGRESS NOTES
Monitor summary:        Rhythm: Sinus rhythm  Rate: 60-82  Ectopy: (r)PVC, (r)coup  Measurements: .16/.08/.35        12hr chart check

## 2024-04-03 NOTE — DISCHARGE PLANNING
HTH/SCP TCN chart review completed. Collaborated with BOBBY Falcon. Discussed current discharge considerations noted to post-acute placement. Appreciate physiatry consulting for possible admission to IRF level of care which was noted to be patient first choice.     TCN will continue to follow and collaborate with discharge planning team as additional post acute needs arise. Thank you.    Completed  PT/OT consult orders noted  Choice obtained:  IRF ([facility]), HH ([facility]) and DME ([facility]) as needed in discharge planning    Update 2:42PM- H/SCP chart review completed. Noted SNF referral sent per CM protocol noting patient with multiple accepting facilities at SNF level of care at this time. Appreciate CM following for patient choice pending IRF consult/ recommendations.

## 2024-04-03 NOTE — DOCUMENTATION QUERY
[facility]                                                                       Query Response Note      PATIENT:               KALPANA BUTTS  ACCT #:                  [id number]  MRN:                     [id number]  :                      1955  ADMIT DATE:       2024 7:27 PM  DISCH DATE:          RESPONDING  PROVIDER #:        [id number]           QUERY TEXT:    An injury to the head is documented in the Medical Record.  Please specify if there is an associated brain injury and/or loss of consciousness:    The patient's Clinical Indicators include:  Findings:  --Patient admitted for right inferior pubic ramus fx s/p GLF  --Per ER provider notes , ''presents with head trauma and right hip trauma after GLF'' documented  --CT Head on  admission:  No evidence of acute intracranial process. Periventricular chronic small      vessel ischemic change  --Per ER provider notes :  Neurological:  Mental status alert  --Per H&P General:  No focal deficit present. Alert and oriented to person, place, time. Mood normal, behavior      normal  --Per H&P Constitutional:  Neurological:  Negative for tremors, speech change, focal weakness and      headaches    Treatment:  --Head CT  --Fall precautions  --Pain control    Risk Factors:  --S/p GLF with right inferior pubic ramus fx sustained  --S/p syncopal episode    Thank you,  Monica Saenz RN, BSN  Clinical   Connect via [employer]  Options provided:   -- Concussion   -- Subdural hematoma   -- Cerebral contusion   -- Intracranial hemorrhage   -- Traumatic brain injury   -- Other explanation, please specify   -- Unable to determine      Query created by: Monica Saenz on 2024 6:56 AM    RESPONSE TEXT:    Concussion          Electronically signed by:  DWIGHT VERNON MD 4/3/2024 1:15 PM

## 2024-04-03 NOTE — CARE PLAN
The patient is Stable - Low risk of patient condition declining or worsening    Shift Goals  Clinical Goals: Pain control, anxiety management, rest  Patient Goals: Pain control, rest  Family Goals: RILEY    Progress made toward(s) clinical / shift goals:    Problem: Pain - Standard  Goal: Alleviation of pain or a reduction in pain to the patient’s comfort goal  Outcome: Progressing     Problem: Knowledge Deficit - Standard  Goal: Patient and family/care givers will demonstrate understanding of plan of care, disease process/condition, diagnostic tests and medications  Outcome: Progressing     Problem: Depression  Goal: Patient and family/caregiver will verbalize accurate information about at least two of the possible causes of depression, three-four of the signs and symptoms of depression  Outcome: Progressing     Problem: Fall Risk  Goal: Patient will remain free from falls  Outcome: Progressing     Problem: Skin Integrity  Goal: Skin integrity is maintained or improved  Outcome: Progressing     Problem: Psychosocial  Goal: Patient's level of anxiety will decrease  Outcome: Progressing  Goal: Patient's ability to verbalize feelings about condition will improve  Outcome: Progressing       Patient is not progressing towards the following goals:

## 2024-04-03 NOTE — CARE PLAN
The patient is Stable - Low risk of patient condition declining or worsening    Shift Goals  Clinical Goals: Pain control, increase mobility  Patient Goals: pain control, rest  Family Goals: RILEY    Progress made toward(s) clinical / shift goals:    Problem: Pain - Standard  Goal: Alleviation of pain or a reduction in pain to the patient’s comfort goal  Outcome: Progressing  Flowsheets  Taken 4/3/2024 1407  Non Verbal Scale:   Grimacing   Calm  Taken 4/3/2024 1401  Pain Rating Scale (NPRS): 10  Note: Patient's pain is improving as evidenced by her ability to mobilize and walk to the bathroom with physio this morning     Problem: Skin Integrity  Goal: Skin integrity is maintained or improved  Outcome: Progressing  Note: Patient remains free from skin breakdown. She is able to turn and position herself in bed with minimal assist.        Patient is not progressing towards the following goals: NA

## 2024-04-03 NOTE — CONSULTS
"    Physical Medicine and Rehabilitation Consultation          Date of initial consultation: 4/3/2024  Consulting provider: Inga Davila M.D.   Reason for consultation: assess for acute inpatient rehab appropriateness  LOS: 2 Day(s)    Chief complaint: Pelvic fracture    HPI: The patient is a 68 y.o. right hand dominant female with a past medical history of type 1 diabetes mellitus, smoking, hypothyroidism, hypertension;  who presented on 4/1/2024  7:27 PM with right hip pain after ground-level fall secondary to syncopal/near syncopal episode after hemodialysis.  Patient fell to the right side, found to have inferior pubic ramus fracture.  Fracture reviewed Dr. Guillermo Montes De Oca MD of orthopedic trauma surgery who reported that her fracture is stable and recommended WBAT.     The patient currently reports ongoing vertigo, dizziness.  Patient has severe pelvic pain.  Patient endorses nausea and vomiting as well.  She has a temporary dialysis catheter in the right chest.  She reports she was recently admitted at [facility] to start dialysis, chart review reflects she was discharged 3/26, represented 4/1.    ROS  Pertinent positives are mentioned in the HPI, all others reviewed and are negative.    Social Hx:  1 SH  3 VIC  With: Alone    Tobacco: Half pack per day    THERAPY:  Restrictions: Fall risk  PT: Functional mobility   4/3: Walking 15 feet with front wheel walker min assist    OT: ADLs  4/3: Min assist toileting and lower body dressing    SLP:   None    IMAGING:  CT pelvis 4/1/2024  1.  Acute right inferior pubic ramus fracture  2.  No other fracture  3.  Lumbar spine facet arthropathy and levoconvex scoliosis  4.  Bilateral sacroiliac joint osteoarthritis    PROCEDURES:  None    PMH:  Past Medical History:   Diagnosis Date    Adverse effect of anesthesia     in 2008 \"throat closes up\"\"anxiety\" ?laryngospasm, kept in ICU. Pt states no problems currently 2018.     Anemia     Anesthesia     in 2008 \"throat closes " "up\"\"anxiety\" ?laryngospasm, kept in ICU. Pt states no problems currently 2018.     Arthritis     right shoulder, hands    Bowel habit changes More frequent    Cataract 2022    Cigarette smoker quit 2013    Dental disorder     missing teeth; Partial plate on top    depression 08/30/2016    denies depression, states has anxiety and panic attacks    Diabetes mellitus type 1 (HCC) 1989    insulin    Dialysis patient (HCC) Short time due to a kidney injury from a infection    Encounter for long-term (current) use of insulin (HCC) 09/25/2013    GI bleed     Heart burn     High cholesterol     Hypertension     Hypothyroidism, postsurgical 1970    Indigestion     Infectious disease      had hepatitis C, tested neg.    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) 06/10/2015       PSH:  Past Surgical History:   Procedure Laterality Date    MS ERCP,DIAGNOSTIC N/A 01/14/2022    Procedure: ERCP, DIAGNOSTIC - WITH SIGMOID COLON BIOPSY AND STENT REMOVAL;  Surgeon: Cr Haro M.D.;  Location: SURGERY [facility];  Service: Gastroenterology    THADDEUS BY LAPAROSCOPY N/A 10/28/2021    Procedure: CHOLECYSTECTOMY, LAPAROSCOPIC;  Surgeon: Tello Trammell M.D.;  Location: SURGERY [facility];  Service: General    MS ERCP,DIAGNOSTIC N/A 10/26/2021    Procedure: ERCP (ENDOSCOPIC RETROGRADE CHOLANGIOPANCREATOGRAPHY);  Surgeon: Cr Haro M.D.;  Location: SURGERY SAME DAY [facility];  Service: Gastroenterology    MS UPPER GI ENDOSCOPY,BIOPSY N/A 10/26/2021    Procedure: GASTROSCOPY, WITH BIOPSY;  Surgeon: Cr Haro M.D.;  Location: SURGERY SAME DAY [facility];  Service: Gastroenterology    MS UPPER GI ENDOSCOPY,DIAGNOSIS N/A 10/26/2021    Procedure: " GASTROSCOPY;  Surgeon: Cr Haro M.D.;  Location: SURGERY SAME DAY [facility];  Service: Gastroenterology    CATH PLACEMENT  01/25/2020    Procedure: INSERTION, CATHETER PERM;  Surgeon: Rola Mendoza M.D.;  Location: SURGERY [facility];  Service: General    IRRIGATION & DEBRIDEMENT NEURO  01/19/2020    Procedure: IRRIGATION AND DEBRIDEMENT, WOUND THORACIC AND LUMBAR;  Surgeon: Ryan Roman M.D.;  Location: SURGERY [facility];  Service: Neurosurgery    MD INS/RPLC SPI NPG/RCVR POCKET N/A 12/16/2019    Procedure: EXPLORATION AT THORACIC 8 - 9, REPLACEMENT OF  NEUROSTIMULATOR LEAD;  Surgeon: Ryan Roman M.D.;  Location: SURGERY [facility];  Service: Neurosurgery    SPINAL CORD STIMULATOR N/A 10/26/2018    Procedure: SPINAL CORD STIMULATOR;  Surgeon: Ryan Roman M.D.;  Location: SURGERY [facility];  Service: Neurosurgery    THORACIC LAMINECTOMY N/A 10/26/2018    Procedure: THORACIC LAMINECTOMY - FOR;  Surgeon: Ryan Roman M.D.;  Location: [facility];  Service: Neurosurgery    MD NJX AA&/STRD TFRML EPI LUMBAR/SACRAL 1 LEVEL Right 08/31/2016    Procedure: INJ-FORAMEN EPI LUM/SAC SNGL L4-5;  Surgeon: Sukhi Godfrey M.D.;  Location: SURGERY [facility];  Service: Pain Management    LUMBAR LAMINECTOMY DISKECTOMY Right 05/10/2016    Procedure: LUMBAR L4-5 HEMILAMINECTOMY DISKECTOMY ;  Surgeon: Arnold Keyes M.D.;  Location: [facility];  Service:     CERVICAL FUSION POSTERIOR  01/16/2009    Performed by TARA CONTRERAS at [facility]    HARDWARE REMOVAL NEURO  01/16/2009    Performed by TARA CONTRERAS at [facility]    NECK EXPLORATION  01/16/2009    Performed by TARA CONTRERAS at [facility]    SHOULDER ARTHROSCOPY W/ ROTATOR CUFF REPAIR  10/09/2008    Performed by SHERLY CASTANEDA at [facility]    SHOULDER DECOMPRESSION ARTHROSCOPIC  06/17/2008    Performed by SHERLY CASTANEDA at [facility]  ORS    CLAVICLE DISTAL EXCISION  06/17/2008    Performed by SHERLY CASTANEDA at SURGERY [facility] ORS    CERVICAL DISK AND FUSION ANTERIOR  03/12/2008    HYSTERECTOMY, VAGINAL  2006    APPENDECTOMY  2004    THYROID LOBECTOMY  1973    TONSILLECTOMY  1963    LUMPECTOMY  1976, 2005    Breast     LUMPECTOMY      OTHER ABDOMINAL SURGERY  2004    OTHER CARDIAC SURGERY  2020 stents inserted       FHX:  Family History   Problem Relation Age of Onset    Hypertension Mother     Cancer Father        Medications:  Current Facility-Administered Medications   Medication Dose    LORazepam (Ativan) tablet 0.5 mg  0.5 mg    [START ON 4/4/2024] levothyroxine (Synthroid) tablet 300 mcg  300 mcg    ursodiol (Actigall) capsule 300 mg  300 mg    And    ursodiol (Actigall) capsule 600 mg  600 mg    nicotine (Nicoderm) 21 MG/24HR 21 mg  21 mg    And    nicotine polacrilex (Nicorette) 2 MG piece 2 mg  2 mg    NS (Bolus) 0.9 % infusion 250 mL  250 mL    epoetin birdie (Epogen/Procrit) injection 4,000 Units  4,000 Units    heparin injection 3,600 Units  3,600 Units    heparin injection 5,000 Units  5,000 Units    acetaminophen (Tylenol) tablet 650 mg  650 mg    senna-docusate (Pericolace Or Senokot S) 8.6-50 MG per tablet 2 Tablet  2 Tablet    And    polyethylene glycol/lytes (Miralax) Packet 1 Packet  1 Packet    Pharmacy Consult Request ...Pain Management Review 1 Each  1 Each    labetalol (Normodyne/Trandate) injection 10 mg  10 mg    ondansetron (Zofran) syringe/vial injection 4 mg  4 mg    ondansetron (Zofran ODT) dispertab 4 mg  4 mg    albuterol inhaler 2 Puff  2 Puff    amLODIPine (Norvasc) tablet 5 mg  5 mg    atorvastatin (Lipitor) tablet 40 mg  40 mg    famotidine (Pepcid) tablet 20 mg  20 mg    hydrALAZINE (Apresoline) tablet 25 mg  25 mg    liothyronine (Cytomel) tablet 5 mcg  5 mcg    metoprolol SR (Toprol XL) tablet 200 mg  200 mg    omeprazole (PriLOSEC) capsule 40 mg  40 mg    torsemide (Demadex) tablet 100 mg  100 mg     "traZODone (Desyrel) tablet 150 mg  150 mg    umeclidinium-vilanterol (Anoro Ellipta) inhaler 1 Puff  1 Puff    venlafaxine XR (Effexor XR) capsule 150 mg  150 mg    [START ON 4/8/2024] vitamin D2 (Ergocalciferol) (Drisdol) capsule 50,000 Units  50,000 Units    insulin regular (HumuLIN R,NovoLIN R) injection  1-6 Units    And    dextrose 10 % BOLUS 25 g  25 g    oxyCODONE immediate-release (Roxicodone) tablet 5 mg  5 mg    Or    oxyCODONE immediate release (Roxicodone) tablet 10 mg  10 mg    Or    HYDROmorphone (Dilaudid) injection 1 mg  1 mg       Allergies:  Allergies   Allergen Reactions    Tape Unspecified and Rash     Blisters, paper tape is ok  Other reaction(s): Rash         Physical Exam:  Vitals: /64   Pulse 70   Temp 36.7 °C (98.1 °F) (Temporal)   Resp 16   Ht 1.626 m (5' 4.02\")   Wt 71 kg (156 lb 8.4 oz)   SpO2 92%   Gen: NAD  Head: NC/AT  Eyes/ Nose/ Mouth: PERRLA, moist mucous membranes  Cardio: RRR, good distal perfusion, warm extremities  Pulm: normal respiratory effort, no cyanosis   Abd: Soft NTND, negative borborygmi   Ext: No peripheral edema. No calf tenderness. No clubbing.    Mental status:  A&Ox4 (person, place, date, situation) answers questions appropriately follows commands  Speech: fluent, no aphasia or dysarthria      Motor:      Upper Extremity  Myotome R L   Shoulder flexion C5 5 5   Elbow flexion C5 5 5   Wrist extension C6 5 5   Elbow extension C7 5 5   Finger flexion C8 5 5   Finger abduction T1 5 5     Lower Extremity Myotome R L   Hip flexion L2 3/5 3/5   Knee extension L3 3/5 3/5   Ankle dorsiflexion L4 4/5 4/5   Toe extension L5 4/5 4/5   Ankle plantarflexion S1 4/5 4/5     Sensory:   intact to light touch through out      Labs: Reviewed and significant for   Recent Labs     04/01/24  1950 04/02/24  0158 04/03/24  0153   RBC 2.87* 2.75* 2.87*   HEMOGLOBIN 8.5* 7.9* 8.4*   HEMATOCRIT 26.7* 25.3* 26.5*   PLATELETCT 255 200 209     Recent Labs     04/01/24  1950 " 04/02/24  0158 04/03/24  0153   SODIUM 140 138 134*   POTASSIUM 3.8 4.0 4.6   CHLORIDE 99 97 95*   CO2 25 25 29   GLUCOSE 90 149* 304*   BUN 23* 24* 18   CREATININE 3.02* 3.04* 2.43*   CALCIUM 7.8* 7.5* 7.9*     Recent Results (from the past 24 hour(s))   POCT glucose device results    Collection Time: 04/02/24  4:43 PM   Result Value Ref Range    POC Glucose, Blood 309 (H) 65 - 99 mg/dL   POCT glucose device results    Collection Time: 04/02/24  9:25 PM   Result Value Ref Range    POC Glucose, Blood 481 (HH) 65 - 99 mg/dL   TSH WITH REFLEX TO FT4    Collection Time: 04/03/24  1:53 AM   Result Value Ref Range    TSH 63.800 (H) 0.380 - 5.330 uIU/mL   CBC WITHOUT DIFFERENTIAL    Collection Time: 04/03/24  1:53 AM   Result Value Ref Range    WBC 7.4 4.8 - 10.8 K/uL    RBC 2.87 (L) 4.20 - 5.40 M/uL    Hemoglobin 8.4 (L) 12.0 - 16.0 g/dL    Hematocrit 26.5 (L) 37.0 - 47.0 %    MCV 92.3 81.4 - 97.8 fL    MCH 29.3 27.0 - 33.0 pg    MCHC 31.7 (L) 32.2 - 35.5 g/dL    RDW 56.8 (H) 35.9 - 50.0 fL    Platelet Count 209 164 - 446 K/uL    MPV 10.5 9.0 - 12.9 fL   Basic Metabolic Panel    Collection Time: 04/03/24  1:53 AM   Result Value Ref Range    Sodium 134 (L) 135 - 145 mmol/L    Potassium 4.6 3.6 - 5.5 mmol/L    Chloride 95 (L) 96 - 112 mmol/L    Co2 29 20 - 33 mmol/L    Glucose 304 (H) 65 - 99 mg/dL    Bun 18 8 - 22 mg/dL    Creatinine 2.43 (H) 0.50 - 1.40 mg/dL    Calcium 7.9 (L) 8.5 - 10.5 mg/dL    Anion Gap 10.0 7.0 - 16.0   ESTIMATED GFR    Collection Time: 04/03/24  1:53 AM   Result Value Ref Range    GFR (CKD-EPI) 21 (A) >60 mL/min/1.73 m 2   FREE THYROXINE    Collection Time: 04/03/24  1:53 AM   Result Value Ref Range    Free T-4 0.92 (L) 0.93 - 1.70 ng/dL   POCT glucose device results    Collection Time: 04/03/24  7:41 AM   Result Value Ref Range    POC Glucose, Blood 310 (H) 65 - 99 mg/dL   POCT glucose device results    Collection Time: 04/03/24 11:46 AM   Result Value Ref Range    POC Glucose, Blood 446 (HH) 65 -  99 mg/dL         ASSESSMENT:  Patient is a 68 y.o. female admitted with pelvic pain after ground-level fall     Rehabilitation: Impaired ADLs and mobility  Barriers to transfer include: Insurance authorization, TCCs to verify disposition, medical clearance and bed availability. All cases are subject to administrative review.     Disposition recommendations:  -Not a candidate for IPR due to lack of discharge support.  Patient lives alone, has 3 children who live locally but they work full-time days.  Patient is unable to tolerate a lot of movement right now due to her pelvic pain, and I think inpatient rehab would make it worse.  Also, her syncope needs to be treated before returning home to make sure that she does not get another injury from a syncopal fall.  Patient will be benefited by a slower paced rehab course.  -PMR will sign off, please reconsult or reach out via Voalte if further evaluation or medical management is requested    Medical Complexity:    Syncope/vertigo  -BP reviewed, appears largely within normal limits  -Patient reports dizziness and spinning consistent with vertigo  -Starting meclizine 25 mg 3 times daily  -Continue PT OT while in-house  -Plan for SNF placement    Pelvic fracture   -Secondary to ground-level fall  -Nonoperative management  -WBAT  -Follow-up with orthopedics    Hypertension  -Metoprolol 200 mg daily  -Demadex 100 mg daily  -Amlodipine 5 mg daily    Anemia  -Hemoglobin 8.4  -Improving.  Monitor with serial labs    ESRD on HD  -New to dialysis  -TDC right chest  -Follows with Dr. Adam  -HD TTS    Tobacco abuse  - Tobacco abuse cessation counseling given today for ~5 min as it pertains to vascular disease, heart attack, stroke, wound healing, and pulmonary disease.     DVT PPX: Heparin 5K 3 times daily      Thank you for allowing us to participate in the care of this patient.     Patient was seen for >80 minutes on unit/floor of which > 50% of time was spent on counseling and  coordination of care regarding the above, including prognosis, risk reduction, benefits of treatment, and options for next stage of care.    Du Sharp, DO   Physical Medicine and Rehabilitation     Please note that this dictation was created using voice recognition software. I have made every reasonable attempt to correct obvious errors, but there may be errors of grammar and possibly content that I did not discover before finalizing the note.

## 2024-04-04 ENCOUNTER — HOME HEALTH ADMISSION (OUTPATIENT)
Dept: HOME HEALTH SERVICES | Facility: HOME HEALTHCARE | Age: 69
End: 2024-04-04
Payer: MEDICARE

## 2024-04-04 LAB
ALBUMIN SERPL BCP-MCNC: 2.9 G/DL (ref 3.2–4.9)
BUN SERPL-MCNC: 28 MG/DL (ref 8–22)
CALCIUM ALBUM COR SERPL-MCNC: 9 MG/DL (ref 8.5–10.5)
CALCIUM SERPL-MCNC: 8.1 MG/DL (ref 8.5–10.5)
CHLORIDE SERPL-SCNC: 94 MMOL/L (ref 96–112)
CO2 SERPL-SCNC: 25 MMOL/L (ref 20–33)
CREAT SERPL-MCNC: 2.81 MG/DL (ref 0.5–1.4)
ERYTHROCYTE [DISTWIDTH] IN BLOOD BY AUTOMATED COUNT: 53.5 FL (ref 35.9–50)
FERRITIN SERPL-MCNC: 55.6 NG/ML (ref 10–291)
GFR SERPLBLD CREATININE-BSD FMLA CKD-EPI: 18 ML/MIN/1.73 M 2
GLUCOSE BLD STRIP.AUTO-MCNC: 250 MG/DL (ref 65–99)
GLUCOSE BLD STRIP.AUTO-MCNC: 285 MG/DL (ref 65–99)
GLUCOSE BLD STRIP.AUTO-MCNC: 356 MG/DL (ref 65–99)
GLUCOSE SERPL-MCNC: 272 MG/DL (ref 65–99)
HCT VFR BLD AUTO: 26.2 % (ref 37–47)
HGB BLD-MCNC: 8.4 G/DL (ref 12–16)
IRON SATN MFR SERPL: 30 % (ref 15–55)
IRON SERPL-MCNC: 84 UG/DL (ref 40–170)
MCH RBC QN AUTO: 29.2 PG (ref 27–33)
MCHC RBC AUTO-ENTMCNC: 32.1 G/DL (ref 32.2–35.5)
MCV RBC AUTO: 91 FL (ref 81.4–97.8)
PHOSPHATE SERPL-MCNC: 3.6 MG/DL (ref 2.5–4.5)
PLATELET # BLD AUTO: 229 K/UL (ref 164–446)
PMV BLD AUTO: 10.9 FL (ref 9–12.9)
POTASSIUM SERPL-SCNC: 4.3 MMOL/L (ref 3.6–5.5)
RBC # BLD AUTO: 2.88 M/UL (ref 4.2–5.4)
SODIUM SERPL-SCNC: 133 MMOL/L (ref 135–145)
TIBC SERPL-MCNC: 279 UG/DL (ref 250–450)
UIBC SERPL-MCNC: 195 UG/DL (ref 110–370)
WBC # BLD AUTO: 9.1 K/UL (ref 4.8–10.8)

## 2024-04-04 PROCEDURE — 80069 RENAL FUNCTION PANEL: CPT

## 2024-04-04 PROCEDURE — 85027 COMPLETE CBC AUTOMATED: CPT

## 2024-04-04 PROCEDURE — 97530 THERAPEUTIC ACTIVITIES: CPT

## 2024-04-04 PROCEDURE — 99233 SBSQ HOSP IP/OBS HIGH 50: CPT | Performed by: STUDENT IN AN ORGANIZED HEALTH CARE EDUCATION/TRAINING PROGRAM

## 2024-04-04 PROCEDURE — 700102 HCHG RX REV CODE 250 W/ 637 OVERRIDE(OP): Performed by: PHYSICAL MEDICINE & REHABILITATION

## 2024-04-04 PROCEDURE — A9270 NON-COVERED ITEM OR SERVICE: HCPCS

## 2024-04-04 PROCEDURE — 83540 ASSAY OF IRON: CPT

## 2024-04-04 PROCEDURE — 700102 HCHG RX REV CODE 250 W/ 637 OVERRIDE(OP)

## 2024-04-04 PROCEDURE — 700111 HCHG RX REV CODE 636 W/ 250 OVERRIDE (IP)

## 2024-04-04 PROCEDURE — 82962 GLUCOSE BLOOD TEST: CPT | Mod: 91

## 2024-04-04 PROCEDURE — A9270 NON-COVERED ITEM OR SERVICE: HCPCS | Performed by: INTERNAL MEDICINE

## 2024-04-04 PROCEDURE — 700111 HCHG RX REV CODE 636 W/ 250 OVERRIDE (IP): Performed by: INTERNAL MEDICINE

## 2024-04-04 PROCEDURE — 770006 HCHG ROOM/CARE - MED/SURG/GYN SEMI*

## 2024-04-04 PROCEDURE — 5A1D70Z PERFORMANCE OF URINARY FILTRATION, INTERMITTENT, LESS THAN 6 HOURS PER DAY: ICD-10-PCS | Performed by: INTERNAL MEDICINE

## 2024-04-04 PROCEDURE — 94664 DEMO&/EVAL PT USE INHALER: CPT

## 2024-04-04 PROCEDURE — 86480 TB TEST CELL IMMUN MEASURE: CPT

## 2024-04-04 PROCEDURE — 90935 HEMODIALYSIS ONE EVALUATION: CPT | Performed by: INTERNAL MEDICINE

## 2024-04-04 PROCEDURE — 97116 GAIT TRAINING THERAPY: CPT

## 2024-04-04 PROCEDURE — 700102 HCHG RX REV CODE 250 W/ 637 OVERRIDE(OP): Performed by: STUDENT IN AN ORGANIZED HEALTH CARE EDUCATION/TRAINING PROGRAM

## 2024-04-04 PROCEDURE — 83550 IRON BINDING TEST: CPT

## 2024-04-04 PROCEDURE — 82728 ASSAY OF FERRITIN: CPT

## 2024-04-04 PROCEDURE — A9270 NON-COVERED ITEM OR SERVICE: HCPCS | Performed by: PHYSICAL MEDICINE & REHABILITATION

## 2024-04-04 PROCEDURE — 90935 HEMODIALYSIS ONE EVALUATION: CPT

## 2024-04-04 PROCEDURE — 700102 HCHG RX REV CODE 250 W/ 637 OVERRIDE(OP): Performed by: INTERNAL MEDICINE

## 2024-04-04 RX ORDER — HYDROMORPHONE HYDROCHLORIDE 1 MG/ML
0.5 INJECTION, SOLUTION INTRAMUSCULAR; INTRAVENOUS; SUBCUTANEOUS EVERY 8 HOURS PRN
Status: DISCONTINUED | OUTPATIENT
Start: 2024-04-04 | End: 2024-04-05 | Stop reason: HOSPADM

## 2024-04-04 RX ORDER — INSULIN LISPRO 100 [IU]/ML
0.2 INJECTION, SOLUTION INTRAVENOUS; SUBCUTANEOUS
Status: DISCONTINUED | OUTPATIENT
Start: 2024-04-04 | End: 2024-04-05 | Stop reason: HOSPADM

## 2024-04-04 RX ORDER — INSULIN LISPRO 100 [IU]/ML
1-6 INJECTION, SOLUTION INTRAVENOUS; SUBCUTANEOUS
Status: DISCONTINUED | OUTPATIENT
Start: 2024-04-04 | End: 2024-04-05 | Stop reason: HOSPADM

## 2024-04-04 RX ORDER — INSULIN LISPRO 100 [IU]/ML
0.2 INJECTION, SOLUTION INTRAVENOUS; SUBCUTANEOUS
Status: DISCONTINUED | OUTPATIENT
Start: 2024-04-04 | End: 2024-04-04

## 2024-04-04 RX ORDER — HYDROMORPHONE HYDROCHLORIDE 1 MG/ML
0.5 INJECTION, SOLUTION INTRAMUSCULAR; INTRAVENOUS; SUBCUTANEOUS EVERY 8 HOURS PRN
Status: DISCONTINUED | OUTPATIENT
Start: 2024-04-04 | End: 2024-04-04

## 2024-04-04 RX ORDER — OXYCODONE HYDROCHLORIDE 5 MG/1
5 TABLET ORAL
Status: DISCONTINUED | OUTPATIENT
Start: 2024-04-04 | End: 2024-04-04

## 2024-04-04 RX ORDER — OXYCODONE HYDROCHLORIDE 5 MG/1
5 TABLET ORAL EVERY 6 HOURS PRN
Status: DISCONTINUED | OUTPATIENT
Start: 2024-04-04 | End: 2024-04-05 | Stop reason: HOSPADM

## 2024-04-04 RX ORDER — HEPARIN SODIUM 1000 [USP'U]/ML
INJECTION, SOLUTION INTRAVENOUS; SUBCUTANEOUS
Status: COMPLETED
Start: 2024-04-04 | End: 2024-04-04

## 2024-04-04 RX ORDER — OXYCODONE HYDROCHLORIDE 10 MG/1
10 TABLET ORAL EVERY 6 HOURS PRN
Status: DISCONTINUED | OUTPATIENT
Start: 2024-04-04 | End: 2024-04-05 | Stop reason: HOSPADM

## 2024-04-04 RX ORDER — OXYCODONE HYDROCHLORIDE 5 MG/1
5 TABLET ORAL EVERY 6 HOURS PRN
Status: DISCONTINUED | OUTPATIENT
Start: 2024-04-04 | End: 2024-04-04

## 2024-04-04 RX ORDER — OXYCODONE HYDROCHLORIDE 10 MG/1
10 TABLET ORAL
Status: DISCONTINUED | OUTPATIENT
Start: 2024-04-04 | End: 2024-04-04

## 2024-04-04 RX ORDER — INSULIN LISPRO 100 [IU]/ML
1-6 INJECTION, SOLUTION INTRAVENOUS; SUBCUTANEOUS
Status: DISCONTINUED | OUTPATIENT
Start: 2024-04-04 | End: 2024-04-04

## 2024-04-04 RX ADMIN — LORAZEPAM 0.5 MG: 0.5 TABLET ORAL at 02:35

## 2024-04-04 RX ADMIN — INSULIN LISPRO 5 UNITS: 100 INJECTION, SOLUTION INTRAVENOUS; SUBCUTANEOUS at 18:07

## 2024-04-04 RX ADMIN — HYDRALAZINE HYDROCHLORIDE 25 MG: 25 TABLET ORAL at 05:51

## 2024-04-04 RX ADMIN — NICOTINE TRANSDERMAL SYSTEM 21 MG: 21 PATCH, EXTENDED RELEASE TRANSDERMAL at 05:49

## 2024-04-04 RX ADMIN — INSULIN LISPRO 2 UNITS: 100 INJECTION, SOLUTION INTRAVENOUS; SUBCUTANEOUS at 21:30

## 2024-04-04 RX ADMIN — MECLIZINE HYDROCHLORIDE 25 MG: 25 TABLET ORAL at 12:27

## 2024-04-04 RX ADMIN — MECLIZINE HYDROCHLORIDE 25 MG: 25 TABLET ORAL at 21:27

## 2024-04-04 RX ADMIN — HEPARIN SODIUM 3600 UNITS: 1000 INJECTION, SOLUTION INTRAVENOUS; SUBCUTANEOUS at 11:56

## 2024-04-04 RX ADMIN — INSULIN GLARGINE-YFGN 20 UNITS: 100 INJECTION, SOLUTION SUBCUTANEOUS at 14:37

## 2024-04-04 RX ADMIN — INSULIN LISPRO 5 UNITS: 100 INJECTION, SOLUTION INTRAVENOUS; SUBCUTANEOUS at 18:06

## 2024-04-04 RX ADMIN — MECLIZINE HYDROCHLORIDE 25 MG: 25 TABLET ORAL at 14:35

## 2024-04-04 RX ADMIN — EPOETIN ALFA 4000 UNITS: 4000 SOLUTION INTRAVENOUS; SUBCUTANEOUS at 11:03

## 2024-04-04 RX ADMIN — ATORVASTATIN CALCIUM 40 MG: 40 TABLET, FILM COATED ORAL at 21:27

## 2024-04-04 RX ADMIN — HYDRALAZINE HYDROCHLORIDE 25 MG: 25 TABLET ORAL at 14:35

## 2024-04-04 RX ADMIN — VENLAFAXINE HYDROCHLORIDE 150 MG: 75 CAPSULE, EXTENDED RELEASE ORAL at 05:50

## 2024-04-04 RX ADMIN — AMLODIPINE BESYLATE 5 MG: 5 TABLET ORAL at 05:51

## 2024-04-04 RX ADMIN — TORSEMIDE 100 MG: 100 TABLET ORAL at 12:27

## 2024-04-04 RX ADMIN — FAMOTIDINE 20 MG: 20 TABLET, FILM COATED ORAL at 05:50

## 2024-04-04 RX ADMIN — LIOTHYRONINE SODIUM 5 MCG: 5 TABLET ORAL at 05:51

## 2024-04-04 RX ADMIN — OXYCODONE HYDROCHLORIDE 10 MG: 10 TABLET ORAL at 12:20

## 2024-04-04 RX ADMIN — TRAZODONE HYDROCHLORIDE 150 MG: 100 TABLET ORAL at 21:27

## 2024-04-04 RX ADMIN — UMECLIDINIUM BROMIDE AND VILANTEROL TRIFENATATE 1 PUFF: 62.5; 25 POWDER RESPIRATORY (INHALATION) at 14:35

## 2024-04-04 RX ADMIN — DOCUSATE SODIUM 50 MG AND SENNOSIDES 8.6 MG 2 TABLET: 8.6; 5 TABLET, FILM COATED ORAL at 18:02

## 2024-04-04 RX ADMIN — LEVOTHYROXINE SODIUM 300 MCG: 0.15 TABLET ORAL at 05:49

## 2024-04-04 RX ADMIN — METOPROLOL SUCCINATE 200 MG: 50 TABLET, FILM COATED, EXTENDED RELEASE ORAL at 05:51

## 2024-04-04 RX ADMIN — INSULIN HUMAN 2 UNITS: 100 INJECTION, SOLUTION PARENTERAL at 12:39

## 2024-04-04 RX ADMIN — HYDRALAZINE HYDROCHLORIDE 25 MG: 25 TABLET ORAL at 21:27

## 2024-04-04 RX ADMIN — OMEPRAZOLE 40 MG: 20 CAPSULE, DELAYED RELEASE ORAL at 05:51

## 2024-04-04 RX ADMIN — HEPARIN SODIUM 5000 UNITS: 5000 INJECTION, SOLUTION INTRAVENOUS; SUBCUTANEOUS at 05:49

## 2024-04-04 RX ADMIN — LORAZEPAM 0.5 MG: 0.5 TABLET ORAL at 16:36

## 2024-04-04 RX ADMIN — URSODIOL 600 MG: 300 CAPSULE ORAL at 18:02

## 2024-04-04 RX ADMIN — URSODIOL 300 MG: 300 CAPSULE ORAL at 12:26

## 2024-04-04 ASSESSMENT — COPD QUESTIONNAIRES
DO YOU EVER COUGH UP ANY MUCUS OR PHLEGM?: NO/ONLY WITH OCCASIONAL COLDS OR INFECTIONS
HAVE YOU SMOKED AT LEAST 100 CIGARETTES IN YOUR ENTIRE LIFE: YES
COPD SCREENING SCORE: 5
IN THE PAST 12 MONTHS DO YOU DO LESS THAN YOU USED TO BECAUSE OF YOUR BREATHING PROBLEMS: AGREE
DURING THE PAST 4 WEEKS HOW MUCH DID YOU FEEL SHORT OF BREATH: NONE/LITTLE OF THE TIME

## 2024-04-04 ASSESSMENT — PAIN DESCRIPTION - PAIN TYPE
TYPE: ACUTE PAIN

## 2024-04-04 ASSESSMENT — ENCOUNTER SYMPTOMS
NAUSEA: 0
HEMOPTYSIS: 0
COUGH: 0
ABDOMINAL PAIN: 0

## 2024-04-04 ASSESSMENT — GAIT ASSESSMENTS
DISTANCE (FEET): 150
ASSISTIVE DEVICE: 4 WHEEL WALKER
GAIT LEVEL OF ASSIST: SUPERVISED
DEVIATION: BRADYKINETIC;ANTALGIC

## 2024-04-04 ASSESSMENT — COGNITIVE AND FUNCTIONAL STATUS - GENERAL
TURNING FROM BACK TO SIDE WHILE IN FLAT BAD: A LITTLE
SUGGESTED CMS G CODE MODIFIER MOBILITY: CJ
MOBILITY SCORE: 22
CLIMB 3 TO 5 STEPS WITH RAILING: A LITTLE

## 2024-04-04 NOTE — CARE PLAN
The patient is Stable - Low risk of patient condition declining or worsening    Shift Goals  Clinical Goals: pain control to comfort level, pt will remain free from falls  Patient Goals: pain control, rest  Family Goals: RILEY    Progress made toward(s) clinical / shift goals:        Problem: Pain - Standard  Goal: Alleviation of pain or a reduction in pain to the patient’s comfort goal  Outcome: Progressing     Problem: Knowledge Deficit - Standard  Goal: Patient and family/care givers will demonstrate understanding of plan of care, disease process/condition, diagnostic tests and medications  Outcome: Progressing     Problem: Fall Risk  Goal: Patient will remain free from falls  Outcome: Progressing       Patient is not progressing towards the following goals:

## 2024-04-04 NOTE — DISCHARGE PLANNING
"TCN following. HTH/SCP chart reviewed. Current discharge considerations noted to post-acute placement v HH (given updated PT recs from today 4/4). Noted per TCC note from this AM, \"Per physiatry patient is not a candidate for IPR\". Pt has multiple accepting SNFs per review as well.   Note Please see prior TCN note from 4/3 for recent discharge planning considerations if indicated as well.     Completed:  PT with most recent recs for HH on 4/4  OT with recs for post acute placement on 4/3  Choice obtained:  IRF ([facility])- > declined, HH ([facility]) and DME ([facility]) as needed in discharge planning; pt has had blanket SNF referrals sent by CM per policy with several accepting if indicated at dc  "

## 2024-04-04 NOTE — PROGRESS NOTES
Hospital Medicine Daily Progress Note    Date of Service  4/4/2024    Chief Complaint  Anu Manuel is a 68 y.o. female admitted 4/1/2024 with hip pain    Hospital Course    68 y.o. female with history of of gestational disease, smoking, diabetes type 1, hypothyroidism, hypertension, ESRD on hemodialysis who presented 4/1/2024 with complaints of right hip pain after fall.  Imaging noted with right inferior pubis ramus fracture.  Reportedly had syncopal episode.  No event on telemetry monitor.  Orthopedic Dr. Montes De Oca evaluated and recommended nonsurgical management.  PT recommending home health for further therapy.     Interval Problem Update    4/4/2024  Seen and examined at bedside in morning down  Vitals remained stable  Reported significant amount of pain around right hip  Labs reviewed normal white count, hemoglobin 8.4, sodium 133, elevated BUN/creatinine, glucose 285.    Continue Norvasc, Lipitor  Lantus was held during admission for low blood sugar.  Lantus resumed with insulin sliding scale  Check iron panel to rule out iron deficiency anemia  Repeat BMP in a.m. to monitor electrolytes and renal function  IV narcotics adjusted  Requiring close monitoring for toxicity while on IV controlled substance  Scheduled for major surgery  Nephrology following for hemodialysis.   Need home health on  discharge                I have discussed this patient's plan of care and discharge plan at IDT rounds today with Case Management, Nursing, Nursing leadership, and other members of the IDT team.    Consultants/Specialty  orthopedics    Code Status  DNAR/DNI    Disposition  The patient is not medically cleared for discharge to home or a post-acute facility.  Anticipate discharge to: home with organized home healthcare and close outpatient follow-up    I have placed the appropriate orders for post-discharge needs.    Review of Systems  Review of Systems   Respiratory:  Negative for cough and hemoptysis.     Cardiovascular:  Negative for chest pain.   Gastrointestinal:  Negative for abdominal pain and nausea.   Musculoskeletal:  Positive for joint pain.        Physical Exam  Temp:  [36.1 °C (96.9 °F)-36.8 °C (98.3 °F)] 36.6 °C (97.9 °F)  Pulse:  [68-80] 70  Resp:  [16-19] 18  BP: (122-148)/(61-75) 122/61  SpO2:  [91 %-99 %] 91 %    Physical Exam  Constitutional:       Appearance: Normal appearance.   Cardiovascular:      Rate and Rhythm: Normal rate and regular rhythm.      Pulses: Normal pulses.      Heart sounds: Normal heart sounds.   Pulmonary:      Effort: Pulmonary effort is normal.      Breath sounds: Normal breath sounds.   Musculoskeletal:      Comments: Range of motion limited in right hip due to pain   Neurological:      General: No focal deficit present.      Mental Status: She is alert and oriented to person, place, and time.   Psychiatric:         Mood and Affect: Mood normal.         Behavior: Behavior normal.         Fluids    Intake/Output Summary (Last 24 hours) at 4/4/2024 1432  Last data filed at 4/4/2024 1205  Gross per 24 hour   Intake 500 ml   Output 3000 ml   Net -2500 ml       Laboratory  Recent Labs     04/02/24  0158 04/03/24  0153 04/04/24  0231   WBC 7.4 7.4 9.1   RBC 2.75* 2.87* 2.88*   HEMOGLOBIN 7.9* 8.4* 8.4*   HEMATOCRIT 25.3* 26.5* 26.2*   MCV 92.0 92.3 91.0   MCH 28.7 29.3 29.2   MCHC 31.2* 31.7* 32.1*   RDW 56.0* 56.8* 53.5*   PLATELETCT 200 209 229   MPV 10.3 10.5 10.9     Recent Labs     04/02/24  0158 04/03/24  0153 04/04/24  0231   SODIUM 138 134* 133*   POTASSIUM 4.0 4.6 4.3   CHLORIDE 97 95* 94*   CO2 25 29 25   GLUCOSE 149* 304* 272*   BUN 24* 18 28*   CREATININE 3.04* 2.43* 2.81*   CALCIUM 7.5* 7.9* 8.1*                   Imaging  CT-PELVIS W/O PLUS RECONS   Final Result      1.  Acute right inferior pubic ramus fracture      2.  No other fracture      3.  Lumbar spine facet arthropathy and levoconvex scoliosis      4.  Bilateral sacroiliac joint osteoarthritis          CT-HEAD W/O   Final Result      1.  No evidence of acute intracranial process.      2.  Periventricular chronic small vessel ischemic change.              Assessment/Plan  * Right inferior pubic rami fracture- (present on admission)  Assessment & Plan  Admitted for pain control, physical therapy  Multimodal pain regimen  Fall precautions  Seen by ortho no katherine for intervention       Syncope  Assessment & Plan  Patient presented with recurrent syncope and orthostatic position, when she stands up started recently after dialysis started.  Differential may include autonomic dysfunction, volumes fluctuations due to dialysis   recent echo in March showed worsening of right intraventricular pressure, RVSP 61 with EF 60%  Plan to monitor on telemetry, orthostatic vitals, Occupational Therapy     4/4/2024  Remained asymptomatic  No event on telemetry        ESRD needing dialysis (HCC)- (present on admission)  Assessment & Plan  On hemodialysis Tuesday Thursday Saturday.    Nephrology assisting with hemodialysis    Chronic obstructive pulmonary disease  Assessment & Plan  Not in exacerbation  Albuterol as needed    Hypothyroidism- (present on admission)  Assessment & Plan  Resume levothyroxine    Type 1 diabetes mellitus on insulin therapy (HCC)- (present on admission)  Assessment & Plan  Reportedly had low blood sugar on arrival  Her blood sugar remained significant elevated  I will resume her Lantus today  Monitor fingerstick closely  Plan to discharge in a.m. if her sugar remained stable  She follows with endocrinologist Dr. Marrero     Cigarette smoker- (present on admission)  Assessment & Plan  Spent approx 5 mins on Tobacco cessation education . Discussed options of nicotine patch, medical treatment with wellbutrin and chantix. Discussed the benefits of quitting smoking and risks of continued smoking including cardiovascular disease, cancer and COPD.   Code 17081           VTE prophylaxis: Heparin sc     I have  performed a physical exam and reviewed and updated ROS and Plan today (4/4/2024). In review of yesterday's note (4/3/2024), there are no changes except as documented above.       Greater than 51 minutes spent preparing to see patient (e.g. review of tests) obtaining and/or reviewing separately obtained history. Performing a medically appropriate examination and/ evaluation.  Counseling and educating the patient/family/caregiver.  Ordering medications, tests, or procedures.  Referring and communicating with other health care professionals.  Documenting clinical information in EPIC.  Independently interpreting results and communicating results to patient/family/caregiver.  Care coordination.

## 2024-04-04 NOTE — DISCHARGE PLANNING
Case Management Discharge Planning    Admission Date: 4/1/2024  GMLOS: 3.9  ALOS: 3    6-Clicks ADL Score: 8  6-Clicks Mobility Score: 22  PT and/or OT Eval ordered: Yes  Post-acute Referrals Ordered: Yes  Post-acute Choice Obtained: NA  Has referral(s) been sent to post-acute provider:  Yes    Anticipated Discharge Dispo: Discharge Disposition: D/T to home under A care in anticipation of covered skilled care (06)  Discharge Address: [address] [place] [id number]  Discharge Contact Phone Number: [phone]    DME Needed: No    Action(s) Taken:   PT recommending home with  for continued physical therapy services.     Per SCP/TCN note, choice form obtained for [facility].     CM RN requested DPA send referral to [facility] per choice form.     [id number]  CM RN met with pt at bedside to discuss discharge plan. Pt is agreeable with home with .     Pt stated address on face sheet is her mailing address and her physical home address is   [address] [place] [phone]  [facility] has approved referral.     Escalations Completed: None    Medically Clear: No    Next Steps:  CM RN to follow up with medical team to discuss discharge barriers or needs.     Barriers to Discharge: Medical clearance    Is the patient up for discharge tomorrow: No

## 2024-04-04 NOTE — CARE PLAN
The patient is Stable - Low risk of patient condition declining or worsening    Shift Goals  Clinical Goals: Dialysis  Patient Goals: Go home  Family Goals: RILEY    Progress made toward(s) clinical / shift goals:  Dialysis today. Free from falls     Patient is not progressing towards the following goals:      Problem: Pain - Standard  Goal: Alleviation of pain or a reduction in pain to the patient’s comfort goal  Outcome: Progressing     Problem: Knowledge Deficit - Standard  Goal: Patient and family/care givers will demonstrate understanding of plan of care, disease process/condition, diagnostic tests and medications  Outcome: Progressing     Problem: Depression  Goal: Patient and family/caregiver will verbalize accurate information about at least two of the possible causes of depression, three-four of the signs and symptoms of depression  Outcome: Progressing     Problem: Fall Risk  Goal: Patient will remain free from falls  Outcome: Progressing

## 2024-04-04 NOTE — DISCHARGE PLANNING
ATTN: Case Management  RE: Referral for Home Health    As of 4/4/2024, we have accepted the Home Health referral for the patient listed above.    A [facility] clinician will be out to see the patient within 48 hours. If you have any questions or concerns regarding the patient’s transition to Home Health, please do not hesitate to contact us at [phone].      We look forward to collaborating with you,  [facility] Team

## 2024-04-04 NOTE — PROGRESS NOTES
[facility] Services Progress Note     Hemodialysis treatment  x 3 hours done today as ordered per Dr. Diggs.  Treatment initiated at 0856 and ended at 1156.      Pt A/Ox4, VSS, no discomfort reported.    Pt tolerated tx well, dozing on and off, no distress noted. See e-flow sheets for further details.     Net UF : 2,500 mL     Post tx CVC care: L subclavian HD ports cleansed per protocol, flushed with NS, locked with Heparin 1ml/1000 unit, clamped and capped. CVC dressing assessed, CDI. Aspirate Heparin prior to next CVC use.     Report given to primary RN.

## 2024-04-04 NOTE — PROGRESS NOTES
Assumed pt care with RN. Pt transferred to unit via bed on room air, and walked to unit bed with x2 hand held assist (staggering, shuffle). Pt is A&OX4 and states she is in 9/10 pain (medication given, see MAR). Plan of care discussed, no further questions. Personal belongings and call light within reach. Pt refused 2RN skin check and bed alarm, education provided.

## 2024-04-04 NOTE — PROCEDURES
Diagnosis: End-Stage Renal Disease admitted with GLF and pelvic fracture. Patient seen and examined on hemodialysis during treatment. Patient is stable, tolerating hemodialysis. Denies chest pain and shortness of breath. Orders updated as needed. Please refer to flowsheet for full details.    Access: KENTRELL nettles  UF goal: 2.5L    Plan: Continue HD Tuesday Thursday Saturday.     OK to discharge from Nephrology standpoint.   There is no need for outpatient nephrology clinic follow up appointment, as the patient will be seen by a nephrologist at their outpatient dialysis center.     Ankit Diggs MD  Nephrology   [facility]

## 2024-04-04 NOTE — PROGRESS NOTES
Blood glucose at 1145 was 446. Blood glucose at 1635 was 439. Dr. Davila made aware of same as per sliding scale insulin order. Scheduled dose of 6 units of Humulin R given. A second dose of 6 units of Humulin R ordered as stat dose for a total of 12 units. Next blood glucose check scheduled for HS.

## 2024-04-04 NOTE — PROGRESS NOTES
Received report, assumed pt care. Pt a&o 4 VSS, Assessment completed. Resting comfortably in bed with call light, bedside table in reach. No c/o at this time. Side rails up 3/4. Instructed to use call light when needing to get OOB verbalized understanding. Bed alarm refused, bed in low position. Will continue to monitor.

## 2024-04-04 NOTE — THERAPY
Physical Therapy   Daily Treatment     Patient Name: Anu Manuel  Age:  68 y.o., Sex:  female  Medical Record #: [id number]  Today's Date: 4/4/2024     Precautions  Precautions: Weight Bearing As Tolerated Right Lower Extremity    Assessment    Rec'd pt alert, pleasant and eager to work w/ PT.  Pt reports that she lives alone and has two steps to enter her home, however her plan is to d/c home to her daughter's home, where she will not have to perform any stairs.  Today, she is able to mobilize at spv level, including ambulating 150 ft w/ her own 4ww.  Mobility is slow, 2/2 pain, however w/o loss of balance or need of assist.  PT will no longer formally follow.  Recommend oob/amb prn w/ nsg and 4ww.    Plan    Treatment Plan Status: Modify Current Treatment Plan    Treatment Duration: Discharge Needs Only    DC Equipment Recommendations: None  Discharge Recommendations: Recommend home health for continued physical therapy services         Objective       04/04/24 0826   Balance   Sitting Balance (Static) Fair +   Sitting Balance (Dynamic) Fair +   Standing Balance (Static) Fair   Standing Balance (Dynamic) Fair   Weight Shift Sitting Good   Weight Shift Standing Good   Comments w/ fww   Bed Mobility    Supine to Sit Supervised   Gait Analysis   Gait Level Of Assist Supervised   Assistive Device 4 Wheel Walker   Distance (Feet) 150   Deviation Bradykinetic;Antalgic   Weight Bearing Status WBAT RLE   Skilled Intervention Verbal Cuing   Functional Mobility   Sit to Stand Supervised   Bed, Chair, Wheelchair Transfer Supervised   Short Term Goals    Short Term Goal # 1 Pt will perform transfers with supervision in 6 visits to improve functional indep.   Goal Outcome # 1 Goal met   Short Term Goal # 2 Pt will ambulate x 125 feet using FWW with supervision in 6 visits to improve functional indep.   Goal Outcome # 2 Goal met   Short Term Goal # 3 d/c stair goal, pt to d/c home w/ daughter, no stairs   Physical  Therapy Treatment Plan   Physical Therapy Treatment Plan Modify Current Treatment Plan   Duration Discharge Needs Only   Anticipated Discharge Equipment and Recommendations   DC Equipment Recommendations None   Discharge Recommendations Recommend home health for continued physical therapy services

## 2024-04-04 NOTE — PROGRESS NOTES
Nephrology Daily Progress Note    Date of Service  4/3/2024    Chief Complaint  68 y.o. female with a history of gestational type 1 diabetes, hypertension, recent initiation of hemodialysis for ESRD on 3/21/2024 who presented 4/1/2024 with ground-level fall.    Interval Problem Update  4/3 -patient had dialysis with 2 L removed yesterday.  Blood pressure was well-controlled after dialysis yesterday.  Patient complains of ongoing pelvic pain/hip pain.  She denies chest pain, shortness of breath.    Review of Systems  Review of Systems   Constitutional:  Negative for fever.   Respiratory:  Negative for shortness of breath.    Cardiovascular:  Negative for chest pain.   Gastrointestinal:  Negative for abdominal pain.   All other systems reviewed and are negative.       Physical Exam  Temp:  [36.4 °C (97.5 °F)-36.8 °C (98.2 °F)] 36.7 °C (98.1 °F)  Pulse:  [64-86] 68  Resp:  [16-18] 16  BP: (129-143)/(64-75) 143/75  SpO2:  [92 %-98 %] 97 %    Physical Exam  Constitutional:       General: She is not in acute distress.     Appearance: She is well-developed.   HENT:      Head: Normocephalic and atraumatic.      Mouth/Throat:      Mouth: Mucous membranes are moist.      Pharynx: Oropharynx is clear. No oropharyngeal exudate.   Eyes:      General: No scleral icterus.     Extraocular Movements: Extraocular movements intact.   Neck:      Trachea: No tracheal deviation.   Cardiovascular:      Rate and Rhythm: Normal rate and regular rhythm.      Heart sounds: Normal heart sounds. No murmur heard.  Pulmonary:      Effort: Pulmonary effort is normal.      Breath sounds: No stridor. No rales.   Abdominal:      Palpations: Abdomen is soft.      Tenderness: There is no abdominal tenderness.   Musculoskeletal:         General: Normal range of motion.      Cervical back: Normal range of motion. No rigidity.      Right lower leg: Edema (1-2+) present.      Left lower leg: Edema (1-2+) present.   Skin:     General: Skin is warm and dry.    Neurological:      General: No focal deficit present.      Mental Status: She is alert and oriented to person, place, and time.   Psychiatric:         Mood and Affect: Mood normal.         Behavior: Behavior normal.     Dialysis access: Right IJ permacath    Fluids    Intake/Output Summary (Last 24 hours) at 4/3/2024 1950  Last data filed at 4/3/2024 1300  Gross per 24 hour   Intake 372 ml   Output 0 ml   Net 372 ml       Laboratory  Labs reviewed, pertinent labs below.  Recent Labs     04/01/24 1950 04/02/24 0158 04/03/24 0153   WBC 8.4 7.4 7.4   RBC 2.87* 2.75* 2.87*   HEMOGLOBIN 8.5* 7.9* 8.4*   HEMATOCRIT 26.7* 25.3* 26.5*   MCV 93.0 92.0 92.3   MCH 29.6 28.7 29.3   MCHC 31.8* 31.2* 31.7*   RDW 57.0* 56.0* 56.8*   PLATELETCT 255 200 209   MPV 10.8 10.3 10.5     Recent Labs     04/01/24 1950 04/02/24 0158 04/03/24 0153   SODIUM 140 138 134*   POTASSIUM 3.8 4.0 4.6   CHLORIDE 99 97 95*   CO2 25 25 29   GLUCOSE 90 149* 304*   BUN 23* 24* 18   CREATININE 3.02* 3.04* 2.43*   CALCIUM 7.8* 7.5* 7.9*               URINALYSIS:  Lab Results   Component Value Date/Time    COLORURINE DK Yellow 02/28/2024 0544    CLARITY Clear 02/28/2024 0544    SPECGRAVITY 1.021 02/28/2024 0544    PHURINE 5.5 02/28/2024 0544    KETONES Negative 02/28/2024 0544    PROTEINURIN 300 (A) 02/28/2024 0544    BILIRUBINUR Small (A) 02/28/2024 0544    UROBILU 1.0 02/28/2024 0544    NITRITE Negative 02/28/2024 0544    LEUKESTERAS Negative 02/28/2024 0544    OCCULTBLOOD Trace (A) 02/28/2024 0544     UPC  Lab Results   Component Value Date/Time    TOTPROTUR 557.0 (H) 02/28/2024 0544      Lab Results   Component Value Date/Time    CREATININEU 82.41 02/28/2024 0539         Imaging interpreted by radiologist. Imaging reports reviewed with pertinent findings below  CT-PELVIS W/O PLUS RECONS   Final Result      1.  Acute right inferior pubic ramus fracture      2.  No other fracture      3.  Lumbar spine facet arthropathy and levoconvex scoliosis       4.  Bilateral sacroiliac joint osteoarthritis         CT-HEAD W/O   Final Result      1.  No evidence of acute intracranial process.      2.  Periventricular chronic small vessel ischemic change.               Current Facility-Administered Medications   Medication Dose Route Frequency Provider Last Rate Last Admin    LORazepam (Ativan) tablet 0.5 mg  0.5 mg Oral Q6HRS PRN BETZAIDA CastilloRCheyNChey   0.5 mg at 04/03/24 0515    [START ON 4/4/2024] levothyroxine (Synthroid) tablet 300 mcg  300 mcg Oral AM ES Inga Davila M.D.        meclizine (Antivert) tablet 25 mg  25 mg Oral TID Du Sharp DRIVERA   25 mg at 04/03/24 1540    ursodiol (Actigall) capsule 300 mg  300 mg Oral QAM Riley Marte M.D.   300 mg at 04/03/24 0506    And    ursodiol (Actigall) capsule 600 mg  600 mg Oral Q EVENING Riley Marte M.D.   600 mg at 04/03/24 1715    nicotine (Nicoderm) 21 MG/24HR 21 mg  21 mg Transdermal Daily-0600 Joseph Cifuentes P.A.-C.   21 mg at 04/03/24 0454    And    nicotine polacrilex (Nicorette) 2 MG piece 2 mg  2 mg Oral Q HOUR PRN Joseph Cifuentes P.A.-C.   2 mg at 04/02/24 0055    NS (Bolus) 0.9 % infusion 250 mL  250 mL Intravenous DIALYSIS PRN Ankit Diggs M.D.        epoetin birdie (Epogen/Procrit) injection 4,000 Units  4,000 Units Intravenous TUE+THU+SAT Ankit Diggs M.D.   4,000 Units at 04/02/24 1333    heparin injection 3,600 Units  3,600 Units Intracatheter DIALYSIS PRN Ankit Diggs M.D.   3,600 Units at 04/02/24 1354    heparin injection 5,000 Units  5,000 Units Subcutaneous Q8HRS Riley aMrte M.D.   5,000 Units at 04/03/24 1351    acetaminophen (Tylenol) tablet 650 mg  650 mg Oral Q6HRS PRN BAILEY Franklin.D.        senna-docusate (Pericolace Or Senokot S) 8.6-50 MG per tablet 2 Tablet  2 Tablet Oral Q EVENING Riley Marte M.D.   2 Tablet at 04/03/24 1714    And    polyethylene glycol/lytes (Miralax) Packet 1 Packet  1 Packet Oral QDAY PRN Riley Marte M.D.        Pharmacy Consult  Request ...Pain Management Review 1 Each  1 Each Other PHARMACY TO DOSE Riley Marte M.D.        labetalol (Normodyne/Trandate) injection 10 mg  10 mg Intravenous Q4HRS PRN Riley Mrate M.D.        ondansetron (Zofran) syringe/vial injection 4 mg  4 mg Intravenous Q4HRS PRN Riley Marte M.D.        ondansetron (Zofran ODT) dispertab 4 mg  4 mg Oral Q4HRS PRN SCOT FranklinDChey   4 mg at 04/02/24 0645    albuterol inhaler 2 Puff  2 Puff Inhalation Q6HRS PRN Riley Marte M.D.   2 Puff at 04/02/24 0638    amLODIPine (Norvasc) tablet 5 mg  5 mg Oral DAILY SCOT FranklinDChey   5 mg at 04/03/24 0503    atorvastatin (Lipitor) tablet 40 mg  40 mg Oral Nightly SCOT FranklinDChey   40 mg at 04/02/24 2140    famotidine (Pepcid) tablet 20 mg  20 mg Oral DAILY SCOT FranklinDChey   20 mg at 04/03/24 0503    hydrALAZINE (Apresoline) tablet 25 mg  25 mg Oral Q8HRS SCOT FranklinDChey   25 mg at 04/03/24 1351    liothyronine (Cytomel) tablet 5 mcg  5 mcg Oral DAILY SCOT FranklinDChey   5 mcg at 04/03/24 0507    metoprolol SR (Toprol XL) tablet 200 mg  200 mg Oral DAILY SCOT FranklinDChey   200 mg at 04/03/24 0505    omeprazole (PriLOSEC) capsule 40 mg  40 mg Oral DAILY SCOT FranklinDChey   40 mg at 04/03/24 0503    torsemide (Demadex) tablet 100 mg  100 mg Oral DAILY SCOT FranklinDChey   100 mg at 04/03/24 0505    traZODone (Desyrel) tablet 150 mg  150 mg Oral QHS Riley aMrte M.D.   150 mg at 04/02/24 2139    umeclidinium-vilanterol (Anoro Ellipta) inhaler 1 Puff  1 Puff Inhalation DAILY Riley Marte M.D.   1 Puff at 04/03/24 0504    venlafaxine XR (Effexor XR) capsule 150 mg  150 mg Oral DAILY Riley Marte M.D.   150 mg at 04/03/24 0502    [START ON 4/8/2024] vitamin D2 (Ergocalciferol) (Drisdol) capsule 50,000 Units  50,000 Units Oral Q7 DAYS Riley Marte M.D.        insulin regular (HumuLIN R,NovoLIN R) injection  1-6 Units Subcutaneous 4X/DAY BILLY Lin  DIPESH Marte   6 Units at 04/03/24 1715    And    dextrose 10 % BOLUS 25 g  25 g Intravenous Q15 MIN PRN Riley Marte M.D.        oxyCODONE immediate-release (Roxicodone) tablet 5 mg  5 mg Oral Q3HRS PRN Riley Marte M.D.        Or    oxyCODONE immediate release (Roxicodone) tablet 10 mg  10 mg Oral Q3HRS PRN Riley Marte M.D.   10 mg at 04/03/24 1401    Or    HYDROmorphone (Dilaudid) injection 1 mg  1 mg Intravenous Q3HRS PRN Riley Marte M.D.   1 mg at 04/03/24 1504         Assessment/Plan  68 y.o. female with a history of gestational type 1 diabetes, hypertension, recent initiation of hemodialysis for ESRD on 3/21/2024 who presented 4/1/2024 with ground-level fall.     1.  ESRD on hemodialysis Tuesday Thursday Saturday.  Oliguric.  There is no need for dialysis today, next planned dialysis Thursday, 4/4/2024.  Avoid NSAIDs and other nephrotoxins.  Check renal function panel daily.     2.  Dialysis access: Right IJ permacath.  Stable.  Patient possibly interested in home peritoneal dialysis.     3.  Ground-level fall and pubic ramus fracture.  There are no operative plans per orthopedic surgery.  Continue physical therapy.     4.  Anemia of chronic disease.  Persistent.  Continue retacrit thrice weekly with HD.  Check CBC at least thrice weekly.  Patient has not received any outpatient Retacrit or Mircera.     5.  CKD-MBD.  Check phosphorus levels daily.  Recommend low phosphorus diet.     6.  Hypertension, labile, but improved after dialysis.  Patient had presyncope prior to admission, recommend discontinue amlodipine.  Consider lower doses of metoprolol, or switch to carvedilol as metoprolol is dialyzable.        Ankit Diggs MD  Nephrology  [facility]

## 2024-04-05 ENCOUNTER — PHARMACY VISIT (OUTPATIENT)
Dept: PHARMACY | Facility: MEDICAL CENTER | Age: 69
End: 2024-04-05
Payer: COMMERCIAL

## 2024-04-05 VITALS
HEART RATE: 75 BPM | TEMPERATURE: 98.5 F | SYSTOLIC BLOOD PRESSURE: 123 MMHG | HEIGHT: 64 IN | RESPIRATION RATE: 20 BRPM | DIASTOLIC BLOOD PRESSURE: 63 MMHG | WEIGHT: 156.53 LBS | OXYGEN SATURATION: 93 % | BODY MASS INDEX: 26.72 KG/M2

## 2024-04-05 LAB
ANION GAP SERPL CALC-SCNC: 13 MMOL/L (ref 7–16)
BUN SERPL-MCNC: 29 MG/DL (ref 8–22)
CALCIUM SERPL-MCNC: 8.1 MG/DL (ref 8.5–10.5)
CHLORIDE SERPL-SCNC: 95 MMOL/L (ref 96–112)
CO2 SERPL-SCNC: 25 MMOL/L (ref 20–33)
CREAT SERPL-MCNC: 2.59 MG/DL (ref 0.5–1.4)
GAMMA INTERFERON BACKGROUND BLD IA-ACNC: 0.03 IU/ML
GFR SERPLBLD CREATININE-BSD FMLA CKD-EPI: 20 ML/MIN/1.73 M 2
GLUCOSE BLD STRIP.AUTO-MCNC: 228 MG/DL (ref 65–99)
GLUCOSE SERPL-MCNC: 160 MG/DL (ref 65–99)
M TB IFN-G BLD-IMP: ABNORMAL
M TB IFN-G CD4+ BCKGRND COR BLD-ACNC: 0 IU/ML
MITOGEN IGNF BCKGRD COR BLD-ACNC: 0.05 IU/ML
POTASSIUM SERPL-SCNC: 4.3 MMOL/L (ref 3.6–5.5)
QFT TB2 - NIL TBQ2: 0 IU/ML
SODIUM SERPL-SCNC: 133 MMOL/L (ref 135–145)

## 2024-04-05 PROCEDURE — 80048 BASIC METABOLIC PNL TOTAL CA: CPT

## 2024-04-05 PROCEDURE — A9270 NON-COVERED ITEM OR SERVICE: HCPCS | Performed by: INTERNAL MEDICINE

## 2024-04-05 PROCEDURE — A9270 NON-COVERED ITEM OR SERVICE: HCPCS | Performed by: STUDENT IN AN ORGANIZED HEALTH CARE EDUCATION/TRAINING PROGRAM

## 2024-04-05 PROCEDURE — RXMED WILLOW AMBULATORY MEDICATION CHARGE: Performed by: STUDENT IN AN ORGANIZED HEALTH CARE EDUCATION/TRAINING PROGRAM

## 2024-04-05 PROCEDURE — 82962 GLUCOSE BLOOD TEST: CPT

## 2024-04-05 PROCEDURE — 700102 HCHG RX REV CODE 250 W/ 637 OVERRIDE(OP): Performed by: PHYSICAL MEDICINE & REHABILITATION

## 2024-04-05 PROCEDURE — 99239 HOSP IP/OBS DSCHRG MGMT >30: CPT | Performed by: STUDENT IN AN ORGANIZED HEALTH CARE EDUCATION/TRAINING PROGRAM

## 2024-04-05 PROCEDURE — 700102 HCHG RX REV CODE 250 W/ 637 OVERRIDE(OP): Performed by: INTERNAL MEDICINE

## 2024-04-05 PROCEDURE — A9270 NON-COVERED ITEM OR SERVICE: HCPCS

## 2024-04-05 PROCEDURE — A9270 NON-COVERED ITEM OR SERVICE: HCPCS | Performed by: PHYSICAL MEDICINE & REHABILITATION

## 2024-04-05 PROCEDURE — 700102 HCHG RX REV CODE 250 W/ 637 OVERRIDE(OP)

## 2024-04-05 PROCEDURE — 700102 HCHG RX REV CODE 250 W/ 637 OVERRIDE(OP): Performed by: STUDENT IN AN ORGANIZED HEALTH CARE EDUCATION/TRAINING PROGRAM

## 2024-04-05 PROCEDURE — 700111 HCHG RX REV CODE 636 W/ 250 OVERRIDE (IP): Performed by: STUDENT IN AN ORGANIZED HEALTH CARE EDUCATION/TRAINING PROGRAM

## 2024-04-05 RX ORDER — HYDROCODONE BITARTRATE AND ACETAMINOPHEN 5; 325 MG/1; MG/1
1 TABLET ORAL EVERY 8 HOURS PRN
Qty: 7 TABLET | Refills: 0 | Status: SHIPPED | OUTPATIENT
Start: 2024-04-05 | End: 2024-04-10

## 2024-04-05 RX ADMIN — FAMOTIDINE 20 MG: 20 TABLET, FILM COATED ORAL at 04:34

## 2024-04-05 RX ADMIN — OXYCODONE HYDROCHLORIDE 10 MG: 10 TABLET ORAL at 06:17

## 2024-04-05 RX ADMIN — INSULIN LISPRO 2 UNITS: 100 INJECTION, SOLUTION INTRAVENOUS; SUBCUTANEOUS at 08:08

## 2024-04-05 RX ADMIN — AMLODIPINE BESYLATE 5 MG: 5 TABLET ORAL at 04:34

## 2024-04-05 RX ADMIN — METOPROLOL SUCCINATE 200 MG: 50 TABLET, FILM COATED, EXTENDED RELEASE ORAL at 04:35

## 2024-04-05 RX ADMIN — LIOTHYRONINE SODIUM 5 MCG: 5 TABLET ORAL at 04:35

## 2024-04-05 RX ADMIN — OMEPRAZOLE 40 MG: 20 CAPSULE, DELAYED RELEASE ORAL at 04:34

## 2024-04-05 RX ADMIN — TORSEMIDE 100 MG: 100 TABLET ORAL at 04:35

## 2024-04-05 RX ADMIN — UMECLIDINIUM BROMIDE AND VILANTEROL TRIFENATATE 1 PUFF: 62.5; 25 POWDER RESPIRATORY (INHALATION) at 04:34

## 2024-04-05 RX ADMIN — NICOTINE TRANSDERMAL SYSTEM 21 MG: 21 PATCH, EXTENDED RELEASE TRANSDERMAL at 04:34

## 2024-04-05 RX ADMIN — HYDRALAZINE HYDROCHLORIDE 25 MG: 25 TABLET ORAL at 04:35

## 2024-04-05 RX ADMIN — LEVOTHYROXINE SODIUM 300 MCG: 0.15 TABLET ORAL at 04:34

## 2024-04-05 RX ADMIN — MECLIZINE HYDROCHLORIDE 25 MG: 25 TABLET ORAL at 08:02

## 2024-04-05 RX ADMIN — INSULIN LISPRO 5 UNITS: 100 INJECTION, SOLUTION INTRAVENOUS; SUBCUTANEOUS at 08:08

## 2024-04-05 RX ADMIN — HYDROMORPHONE HYDROCHLORIDE 0.5 MG: 1 INJECTION, SOLUTION INTRAMUSCULAR; INTRAVENOUS; SUBCUTANEOUS at 08:02

## 2024-04-05 RX ADMIN — URSODIOL 300 MG: 300 CAPSULE ORAL at 04:35

## 2024-04-05 RX ADMIN — VENLAFAXINE HYDROCHLORIDE 150 MG: 75 CAPSULE, EXTENDED RELEASE ORAL at 04:35

## 2024-04-05 ASSESSMENT — COGNITIVE AND FUNCTIONAL STATUS - GENERAL
TURNING FROM BACK TO SIDE WHILE IN FLAT BAD: A LITTLE
HELP NEEDED FOR BATHING: A LOT
DAILY ACTIVITIY SCORE: 15
DRESSING REGULAR LOWER BODY CLOTHING: A LOT
WALKING IN HOSPITAL ROOM: A LITTLE
PERSONAL GROOMING: A LITTLE
MOVING FROM LYING ON BACK TO SITTING ON SIDE OF FLAT BED: A LITTLE
STANDING UP FROM CHAIR USING ARMS: A LITTLE
SUGGESTED CMS G CODE MODIFIER MOBILITY: CK
EATING MEALS: A LITTLE
TOILETING: A LOT
MOVING TO AND FROM BED TO CHAIR: A LITTLE
CLIMB 3 TO 5 STEPS WITH RAILING: A LITTLE
MOBILITY SCORE: 18
DRESSING REGULAR UPPER BODY CLOTHING: A LITTLE
SUGGESTED CMS G CODE MODIFIER DAILY ACTIVITY: CK

## 2024-04-05 ASSESSMENT — PAIN DESCRIPTION - PAIN TYPE
TYPE: ACUTE PAIN

## 2024-04-05 NOTE — DISCHARGE PLANNING
TCN following. HTH/SCP chart reviewed. No new TCN needs identified. Please see prior TCN note from 4/4 for most recent discharge planning considerations if indicated. Note that [facility]  has accepted pt as well and per review, anticipated plan is to dc to home today with family and [facility].    Completed:  PT with most recent recs for HH on 4/4  OT with recs for post acute placement on 4/3  Choice obtained:  IRF ([facility])- > declined, HH ([facility])-> accepted and DME ([facility]) as needed in discharge planning; pt has had blanket SNF referrals sent by  per policy with several accepting if indicated at dc

## 2024-04-05 NOTE — RESPIRATORY CARE
" COPD EDUCATION by COPD CLINICAL EDUCATOR  4/4/2024 at 5:06 PM by Elida Bryan RRT     Patient interviewed by education team.  Patient declined or is unable to participate in the full program.  Therefore, a short intervention has been conducted.  A comprehensive packet including information about COPD, types of treatments to manage their disease and safe home Oxygen usage was provided and reviewed with patient at the bedside.  Patient given a spacer with instruction. Patient states she would like to see a pulmonologist, will reach out to her PCP to suggest discussing this with the patient, and patient has accepted assistance in making follow up appointment with her PCP, we will call in the morning.      Smoking Cessation Intervention and education completed, 5 minutes spent on smoking cessation education with patient.  Provided smoking cessation packet with \"Tips to Quit\" and brochure for \"Free Smoking Cessation Classes\".       COPD Screen  COPD Risk Screening  Do you have a history of COPD?: Yes  Do you have a Pulmonologist?: No  COPD Population Screener  During the past 4 weeks, how much did you feel short of breath?: None/Little of the time  Do you ever cough up any mucus or phlegm?: No/only with occasional colds or infections  In the past 12 months, you do less than you used to because of your breathing problems: Agree  Have you smoked at least 100 cigarettes in your entire life?: Yes  How old are you?: 60+  COPD Screening Score: 5  COPD Coordinator Recommended: Yes    COPD Assessment  COPD Clinical Specialists ONLY  COPD Education Initiated: Yes--Short Intervention (Given COPD and Smoking Cessation literature, spacer. OK to make f/u apts, will place referral to TCP)  Is this a COPD exacerbation patient?: No  DME Company: unknown  DME Equipment Type: O2 @ 2 L PRN  Physician Follow Up Appointment:  (will request f/u apt)  Physician Name: Jarrell Yates M.D.  Pulmonologist Name: Pt interested in Pulmonary referral, " "will message PCP  Advance Directive: Yes  Smoking Cessation: Yes  [facility]: Yes (established)  $ Demo/Eval of SVN's, MDI's and Aerosols: Yes (spacer)  (OP) Pulmonary Function Testing:  (none on file)  Interdisciplinary Rounds: Attendance at Rounds (30 Min)    PFT Results    No results found for: \"PFT\"    Meds to Beds        MY COPD ACTION PLAN     It is recommended that patients and physicians /healthcare providers complete this action plan together. This plan should be discussed at each physician visit and updated as needed.    The green, yellow and red zones show groups of symptoms of COPD. This list of symptoms is not comprehensive, and you may experience other symptoms. In the \"Actions\" column, your healthcare provider has recommended actions for you to take based on your symptoms.    Patient Name: Anu Manuel   YOB: 1955   Last Updated on: 3/22/2024 10:08 AM   Green Zone:  I am doing well today Actions     Usual activitiy and exercise level   Take daily medications     Usual amounts of cough and phlegm/mucus   Use oxygen as prescribed     Sleep well at night   Continue regular exercise/diet plan     Appetite is good   At all times avoid cigarette smoke, inhaled irritants     Daily Medications (these medications are taken every day):   Umeclidinium and Vilanterol (Anoro) 1 Puff Once daily        Yellow Zone:  I am having a bad day or a COPD flare Actions     More breathless than usual   Continue daily medications     I have less energy for my daily activities   Use quick relief inhaler as ordered     Increased or thicker phlegm/mucus   Use oxygen as prescribed     Using quick relief inhaler/nebulizer more often   Get plenty of rest     Swelling of ankles more than usual   Use pursed lip breathing     More coughing than usual   At all times avoid cigarette smoke, inhaled irritants     I feel like I have a \"chest cold\"     Poor sleep and my symptoms woke me up     My " appetite is not good     My medicine is not helping      Call provider immediately if symptoms don’t improve     Continue daily medications, add rescue medications:   Albuterol 2 Puffs Every 6 hours PRN       Medications to be used during a flare up, (as Discussed with Provider):           Additional Information:  Use your spacer with your albuterol     Red Zone:  I need urgent medical care Actions     Severe shortness of breath even at rest   Call 911 or seek medical care immediately     Not able to do any activity because of breathing      Fever or shaking chills      Feeling confused or very drowsy       Chest pains      Coughing up blood

## 2024-04-05 NOTE — PROGRESS NOTES
Assumed care of patient at 1900. Received report from day RN. Patient A&Ox4, on RA, Reporting a pain level of 2/10, declines interventions. HD cath in place. Call light within reach, belongings within reach, fall precautions in place, bed in lowest position. Patient does not have any other needs at this time.     POC was discussed with patient. All questions were answered. Patient verbalized understanding.

## 2024-04-05 NOTE — PROGRESS NOTES
Patient given discharge instructions. Discussed diet, activity, follow up appointments, after care, symptoms and management, and prescriptions provided. Packet sent with patient.  IV d/c'd, discharge paperwork signed, and all questions answered. Patient discharged home via car with relative. Patient discharged from discharge lounge via wheelchair.

## 2024-04-05 NOTE — CARE PLAN
The patient is Stable - Low risk of patient condition declining or worsening    Shift Goals  Clinical Goals: Pt to remain free from falls. Pt's pain to remain <5/10 throughout shift.  Patient Goals: Rest, go home  Family Goals: RILEY    Progress made toward(s) clinical / shift goals:      Pt remains free from falls and demonstrates appropriate use of call light. Pt declines pain medication but states pain rating of 2/10. Pt able to rest throughout the night with minimal interruptions.     Problem: Pain - Standard  Goal: Alleviation of pain or a reduction in pain to the patient’s comfort goal  Outcome: Progressing     Problem: Knowledge Deficit - Standard  Goal: Patient and family/care givers will demonstrate understanding of plan of care, disease process/condition, diagnostic tests and medications  Outcome: Progressing     Problem: Fall Risk  Goal: Patient will remain free from falls  Outcome: Progressing       Patient is not progressing towards the following goals:

## 2024-04-05 NOTE — DISCHARGE PLANNING
Case Management Discharge Planning    Admission Date: 4/1/2024  GMLOS: 3.9  ALOS: 4    6-Clicks ADL Score: 15  6-Clicks Mobility Score: 18  PT and/or OT Eval ordered: Yes  Post-acute Referrals Ordered: No  Post-acute Choice Obtained: NA  Has referral(s) been sent to post-acute provider:  Yes    Anticipated Discharge Dispo: Discharge Disposition: D/T to home under A care in anticipation of covered skilled care (06)  Discharge Address: [address] [place] [address]  Discharge Contact Phone Number: [phone]    DME Needed: No    Action(s) Taken:   Per MD, pt is medically clear to discharge home today with HH. [facility] has accepted referral.     CM RN met with pt at bedside. Per pt, family is to provide her with transportation home upon discharge.     IMM delivered.     Social Determinants of Health community resources provided.       Escalations Completed: None    Medically Clear: Yes    Next Steps:  CM RN to follow up with medical team to discuss discharge barriers or needs.     Barriers to Discharge: None    Is the patient up for discharge tomorrow: No

## 2024-04-05 NOTE — PROGRESS NOTES
Received report and assumed care of patient at change of shift. Patient is A&Ox4, on RA, and reports 9/10 pain in the hips at this time. Patient assessment completed, bed in lowest position, and call light and personal belongings are within reach. Patient expressed no further needs at this time.

## 2024-04-08 ENCOUNTER — TELEPHONE (OUTPATIENT)
Dept: ENDOCRINOLOGY | Facility: MEDICAL CENTER | Age: 69
End: 2024-04-08
Payer: MEDICARE

## 2024-04-08 NOTE — TELEPHONE ENCOUNTER
Pt called and was recently in the hospital. When she was discharged, she fell and fractured her pelvis so she is having a hard time moving around. She wasn't able to get labs completed, but the hospital adriel most of what you had ordered. She is wondering if she is okay to keep her appt without having the labs drawn and if she can do a virtual appt. Please advise patient.

## 2024-04-11 ENCOUNTER — TELEMEDICINE (OUTPATIENT)
Dept: ENDOCRINOLOGY | Facility: MEDICAL CENTER | Age: 69
End: 2024-04-11
Attending: INTERNAL MEDICINE
Payer: MEDICARE

## 2024-04-11 VITALS — HEIGHT: 64 IN | BODY MASS INDEX: 26.63 KG/M2 | WEIGHT: 156 LBS

## 2024-04-11 DIAGNOSIS — M80.00XD AGE-RELATED OSTEOPOROSIS WITH CURRENT PATHOLOGICAL FRACTURE WITH ROUTINE HEALING: ICD-10-CM

## 2024-04-11 DIAGNOSIS — E78.5 DYSLIPIDEMIA: ICD-10-CM

## 2024-04-11 DIAGNOSIS — Z91.148 HISTORY OF MEDICATION NONCOMPLIANCE: ICD-10-CM

## 2024-04-11 DIAGNOSIS — Z79.4 LONG-TERM INSULIN USE (HCC): ICD-10-CM

## 2024-04-11 DIAGNOSIS — E55.9 VITAMIN D DEFICIENCY: ICD-10-CM

## 2024-04-11 DIAGNOSIS — E89.0 HYPOTHYROIDISM, POSTSURGICAL: ICD-10-CM

## 2024-04-11 DIAGNOSIS — E10.9 TYPE 1 DIABETES MELLITUS ON INSULIN THERAPY (HCC): ICD-10-CM

## 2024-04-11 PROBLEM — E03.9 HYPOTHYROIDISM: Status: RESOLVED | Noted: 2020-08-10 | Resolved: 2024-04-11

## 2024-04-11 PROCEDURE — 99215 OFFICE O/P EST HI 40 MIN: CPT | Mod: 95 | Performed by: INTERNAL MEDICINE

## 2024-04-11 RX ORDER — DIPHENHYDRAMINE HYDROCHLORIDE 50 MG/ML
50 INJECTION INTRAMUSCULAR; INTRAVENOUS PRN
OUTPATIENT
Start: 2024-04-11

## 2024-04-11 RX ORDER — EPINEPHRINE 1 MG/ML(1)
0.5 AMPUL (ML) INJECTION PRN
OUTPATIENT
Start: 2024-04-11

## 2024-04-11 RX ORDER — LEVOTHYROXINE SODIUM 125 MCG
250 TABLET ORAL
Qty: 180 TABLET | Refills: 2 | Status: SHIPPED | OUTPATIENT
Start: 2024-04-11

## 2024-04-11 RX ORDER — METHYLPREDNISOLONE SODIUM SUCCINATE 125 MG/2ML
125 INJECTION, POWDER, LYOPHILIZED, FOR SOLUTION INTRAMUSCULAR; INTRAVENOUS PRN
OUTPATIENT
Start: 2024-04-11

## 2024-04-11 ASSESSMENT — FIBROSIS 4 INDEX: FIB4 SCORE: 2.4

## 2024-04-11 NOTE — PROGRESS NOTES
CHIEF COMPLAINT: Patient is here for follow up of Type 1 Diabetes Mellitus  Patient was presented for a telehealth consultation via secure and encrypted videoconferencing technology.   This encounter was conducted via Zoom . Verbal consent was obtained. Patient's identity was verified.    Virtual visit consent       This evaluation was conducted via Zoom using secure and encrypted videoconferencing technology.   The patient was (home or other private:[id number]) in the [facility].   The patient's identity was confirmed and verbal consent was obtained for this virtual visit.       HPI:     Anu Manuel is a 69 y.o. female with Type 1 Diabetes Mellitus here for follow up.      Labs from 2/29/2024 show A1c is 8.7%  Labs from 12/21/2023 HbA1c is 8.4%      She was diagnosed with type 1 diabetes at the age of 34.  She does not really carb count well and estimates her insulin  She has a hx of CAD and is a smoker  She also has postsurgical hypothyroidism  She underwent a total thyroidectomy at the age of 14   (pathology was benign)  She also has depression and anxiety and is on the Lasix and    She has a history of noncompliance with her insulin and thyroid hormone      She is on Toujeo 28u daily  Fiasp 1:5 grams  Correction 1:50> 150      She requested a virtual visit due to severe pain  She was hospitalized for a fall and hip fracture on 4/2024  She reports that wasn't getting any basal insulin while in the hospital  She also started HD 4 weeks ago but she reports that she is going to start PD at home      We did not download her CGM due to technical reasons        She has hyperlipidemia  She has a history of coronary artery disease with prior PCI and angioplasty  She is on atorvastatin 40 mg daily  She denies muscle aches and muscle weakness   Latest Reference Range & Units 08/17/23 08:50   Cholesterol,Tot 100 - 199 mg/dL 232 (H)   Triglycerides 0 - 149 mg/dL 52   HDL >=40 mg/dL 145   LDL <100 mg/dL 77        She does have diabetic kidney disease and CKD  She sees Dr. Najjar  She is currently not on an ACE inhibitor for unknown reasons  She is on amlodipine 5 mg daily and Lasix 40 mg daily     Latest Reference Range & Units 09/18/23 14:47   Micro Alb Creat Ratio 0 - 30 mg/g 2054 (H)   Creatinine, Urine mg/dL 39.59   Microalbumin, Urine Random mg/dL 81.3       Her last eye exam was on 8/16/2022  She is overdue for an eye exam      With respect to her postsurgical hypothyroidism she is to be on combination therapy with brand-name Synthroid and Cytomel  She has tried and failed generic levothyroxine  Currently she is on Synthroid 250 mcg daily (125mcg x2)  Cytomel 5mcg daily  She admits to forgetting her thyroid hormone  Her TSH is 63 on 4/2024        BG Diary:  CGM was downloaded and reviewed    Weight has been stable    Diabetes Complications   Retinopathy: No known retinopathy.  Last eye exam: She is overdue for an eye exam  Neuropathy: Denies paresthesias or numbness in hands or feet. Denies any foot wounds.  Exercise: Minimal.  Diet: Fair.  Patient's medications, allergies, and social histories were reviewed and updated as appropriate.    ROS:     CONS:     No fever, no chills   EYES:     No diplopia, no blurry vision   CV:           No chest pain, no palpitations   PULM:     No SOB, no cough, no hemoptysis.   GI:            No nausea, no vomiting, no diarrhea, no constipation   ENDO:     No polyuria, no polydipsia, no heat intolerance, no cold intolerance       Past Medical History:  Problem List:  2024-04: Syncope  2024-04: Right inferior pubic rami fracture  2024-03: ESRD needing dialysis ([facility])  2024-03: MDD (major depressive disorder)  2024-02: High anion gap metabolic acidosis  2024-02: Chronic obstructive pulmonary disease  2024-02: Acute renal failure superimposed on stage 4 chronic kidney   disease, unspecified acute renal failure type ([facility])  2024-02: Proteinuria  2024-02: CKD (chronic kidney disease)  stage 4, GFR 15-29 ml/min ([facility])  2024-02: BMI 23.0-23.9, adult  2024-02: Moderate episode of recurrent major depressive disorder ([facility])  2024-02: Aortic atherosclerosis ([facility])  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  2024-02: Uncomplicated opioid dependence ([facility])  2024-02: Chronic low back pain  2023-12: Stage 3b chronic kidney disease  2023-03: Senile purpura ([facility])  2022-08: Long-term insulin use ([facility])  2022-01: Liver failure without hepatic coma ([facility])  2021-11: Constipation  2021-11: Elevated LFTs  2021-11: Fluid collection at surgical site  2021-11: Right upper quadrant pain  2021-10: Anasarca  2021-10: Intractable pain  2021-10: Cholecystitis  2021-10: Dehydration  2021-10: Bilious vomiting with nausea  2021-10: Hepatitis  2021-10: Jaundice  2021-10: Current moderate episode of major depressive disorder ([facility])  2021-10: Generalized abdominal pain  2021-09: Epigastric pain  2021-09: Elevated liver enzymes  2021-09: Anemia  2021-09: Hyponatremia  2021-09: Elevated troponin  2020-08: Pain in the chest  2020-08: CAD S/P percutaneous coronary angioplasty  2020-08: Hypothyroidism  2020-06: Stage 3a chronic kidney disease  2020-06: Hyperlipidemia  2020-06: GERD (gastroesophageal reflux disease)  2020-06: Lung nodule  2020-06: Hemoptysis  2020-01: Chest pain with 8 beat run of VT   2020-01: Anxiety  2020-01: Nausea & vomiting  2020-01: Primary hypertension  2020-01: Hypoglycemia  2020-01: RHEA (acute kidney injury) ([facility])  2020-01: Post-operative wound abscess  2020-01: Tobacco abuse  2018-03: Cellulitis  2018-03: Left hip pain  2016-08: Acute left lumbar radiculopathy  2016-05: Lumbar spinal stenosis  2015-06: DM (diabetes mellitus) ([facility])  2015-06: Diabetic polyneuropathy ([facility])  2015-04: Vertigo  2013-09: Type 1 diabetes mellitus on insulin therapy ([facility])  2013-09: ACP (advance care planning)  2012-08: Accidental drug overdose  2012-03: Vitamin D deficiency  Hypothyroidism, postsurgical  Type 1 diabetes mellitus with kidney  complication (HCC)  Cigarette smoker      Past Surgical History:  Past Surgical History:   Procedure Laterality Date    AR ERCP,DIAGNOSTIC N/A 01/14/2022    Procedure: ERCP, DIAGNOSTIC - WITH SIGMOID COLON BIOPSY AND STENT REMOVAL;  Surgeon: Cr Haro M.D.;  Location: [facility];  Service: Gastroenterology    THADDEUS BY LAPAROSCOPY N/A 10/28/2021    Procedure: CHOLECYSTECTOMY, LAPAROSCOPIC;  Surgeon: Tello Trammell M.D.;  Location: [facility];  Service: General    AR ERCP,DIAGNOSTIC N/A 10/26/2021    Procedure: ERCP (ENDOSCOPIC RETROGRADE CHOLANGIOPANCREATOGRAPHY);  Surgeon: Cr Haro M.D.;  Location: SURGERY SAME DAY [facility];  Service: Gastroenterology    AR UPPER GI ENDOSCOPY,BIOPSY N/A 10/26/2021    Procedure: GASTROSCOPY, WITH BIOPSY;  Surgeon: Cr Haro M.D.;  Location: SURGERY SAME DAY [facility];  Service: Gastroenterology    AR UPPER GI ENDOSCOPY,DIAGNOSIS N/A 10/26/2021    Procedure: GASTROSCOPY;  Surgeon: Cr Haro M.D.;  Location: SURGERY SAME DAY [facility];  Service: Gastroenterology    CATH PLACEMENT  01/25/2020    Procedure: INSERTION, CATHETER PERM;  Surgeon: Rola Mendoza M.D.;  Location: [facility];  Service: General    IRRIGATION & DEBRIDEMENT NEURO  01/19/2020    Procedure: IRRIGATION AND DEBRIDEMENT, WOUND THORACIC AND LUMBAR;  Surgeon: Ryan Roman M.D.;  Location: [facility];  Service: Neurosurgery    AR INS/RPLC SPI NPG/RCVR POCKET N/A 12/16/2019    Procedure: EXPLORATION AT THORACIC 8 - 9, REPLACEMENT OF  NEUROSTIMULATOR LEAD;  Surgeon: Ryan Roamn M.D.;  Location: [facility];  Service: Neurosurgery    SPINAL CORD STIMULATOR N/A 10/26/2018    Procedure: SPINAL CORD STIMULATOR;  Surgeon: Ryan Roman M.D.;  Location: [facility];  Service: Neurosurgery    THORACIC LAMINECTOMY N/A 10/26/2018    Procedure: THORACIC LAMINECTOMY - FOR;  Surgeon: Ryan Roman M.D.;  Location: [facility];  " Service: Neurosurgery    MD NJX AA&/STRD TFRML EPI LUMBAR/SACRAL 1 LEVEL Right 08/31/2016    Procedure: INJ-FORAMEN EPI LUM/SAC SNGL L4-5;  Surgeon: Sukhi Godfrey M.D.;  Location: SURGERY [facility];  Service: Pain Management    LUMBAR LAMINECTOMY DISKECTOMY Right 05/10/2016    Procedure: LUMBAR L4-5 HEMILAMINECTOMY DISKECTOMY ;  Surgeon: Arnold Keyes M.D.;  Location: SURGERY [facility];  Service:     CERVICAL FUSION POSTERIOR  01/16/2009    Performed by TARA CONTRERAS at [facility]    HARDWARE REMOVAL NEURO  01/16/2009    Performed by TARA CONTRERAS at [facility]    NECK EXPLORATION  01/16/2009    Performed by TARA CONTRERAS at [facility]    SHOULDER ARTHROSCOPY W/ ROTATOR CUFF REPAIR  10/09/2008    Performed by SHERLY CASTANEDA at [facility]    SHOULDER DECOMPRESSION ARTHROSCOPIC  06/17/2008    Performed by SHERLY CASTANEDA at [facility]    CLAVICLE DISTAL EXCISION  06/17/2008    Performed by SHERLY CASTANEDA at [facility]    CERVICAL DISK AND FUSION ANTERIOR  03/12/2008    HYSTERECTOMY, VAGINAL  2006    APPENDECTOMY  2004    THYROID LOBECTOMY  1973    TONSILLECTOMY  1963    LUMPECTOMY  1976, 2005    Breast     LUMPECTOMY      OTHER ABDOMINAL SURGERY  2004    OTHER CARDIAC SURGERY  2020 stents inserted        Allergies:  Tape     Social History:  Social History     Tobacco Use    Smoking status: Every Day     Current packs/day: 0.50     Average packs/day: 0.5 packs/day for 30.0 years (15.0 ttl pk-yrs)     Types: Cigarettes    Smokeless tobacco: Never   Vaping Use    Vaping Use: Never used   Substance Use Topics    Alcohol use: Not Currently    Drug use: Not Currently     Types: Inhaled, Oral, Marijuana     Comment: Marijuana - Occasional; edibles        Family History:   family history includes Cancer in her father; Hypertension in her mother.      PHYSICAL EXAM:   OBJECTIVE:  Vital signs: Ht 1.626 m (5' 4\")   Wt 70.8 kg " (156 lb)   LMP 04/29/2005 (LMP Unknown)   BMI 26.78 kg/m²   GENERAL: Well-developed, well-nourished in no apparent distress.   EYE:  No ocular asymmetry, PERRLA  HENT: Pink, moist mucous membranes.    NECK: No thyromegaly.   CARDIOVASCULAR:  No murmurs  LUNGS: Clear breath sounds  ABDOMEN: Soft, nontender   EXTREMITIES: No clubbing, cyanosis, or edema.   NEUROLOGICAL: No gross focal motor abnormalities   LYMPH: No cervical adenopathy seen  SKIN: No rashes, lesions.     Labs:  Lab Results   Component Value Date/Time    HBA1C 8.7 (H) 02/29/2024 02:19 AM        Lab Results   Component Value Date/Time    WBC 9.1 04/04/2024 02:31 AM    RBC 2.88 (L) 04/04/2024 02:31 AM    HEMOGLOBIN 8.4 (L) 04/04/2024 02:31 AM    MCV 91.0 04/04/2024 02:31 AM    MCH 29.2 04/04/2024 02:31 AM    MCHC 32.1 (L) 04/04/2024 02:31 AM    RDW 53.5 (H) 04/04/2024 02:31 AM    MPV 10.9 04/04/2024 02:31 AM       Lab Results   Component Value Date/Time    SODIUM 133 (L) 04/05/2024 05:41 AM    POTASSIUM 4.3 04/05/2024 05:41 AM    CHLORIDE 95 (L) 04/05/2024 05:41 AM    CO2 25 04/05/2024 05:41 AM    ANION 13.0 04/05/2024 05:41 AM    GLUCOSE 160 (H) 04/05/2024 05:41 AM    BUN 29 (H) 04/05/2024 05:41 AM    CREATININE 2.59 (H) 04/05/2024 05:41 AM    CREATININE 1.0 01/08/2009 04:31 PM    CALCIUM 8.1 (L) 04/05/2024 05:41 AM    ASTSGOT 41 04/02/2024 01:58 AM    ALTSGPT 25 04/02/2024 01:58 AM    TBILIRUBIN 0.3 04/02/2024 01:58 AM    ALBUMIN 2.9 (L) 04/04/2024 02:31 AM    ALBUMIN 3.35 (L) 05/09/2023 01:16 PM    TOTPROTEIN 7.4 04/02/2024 01:58 AM    TOTPROTEIN 6.5 05/09/2023 01:16 PM    GLOBULIN 4.4 (H) 04/02/2024 01:58 AM    AGRATIO 0.7 04/02/2024 01:58 AM       Lab Results   Component Value Date/Time    CHOLSTRLTOT 232 (H) 08/17/2023 0850    TRIGLYCERIDE 52 08/17/2023 0850     08/17/2023 0850    LDL 77 08/17/2023 0850       Lab Results   Component Value Date/Time    MALBCRT 4218 (H) 03/13/2024 02:22 PM    MICROALBUR 165.2 03/13/2024 02:22 PM        Lab  "Results   Component Value Date/Time    TSHULTRASEN 10.300 (H) 12/15/2023 1544     No results found for: \"FREEDIR\"  Lab Results   Component Value Date/Time    FREET3 1.88 (L) 2023 1448     No results found for: \"THYSTIMIG\"    IMAGIN2022 1:34 PM     HISTORY/REASON FOR EXAM:  Osteopenia L1-L4 levels and proximal left femur, postmenopausal, hysterectomy, tobacco use, adult bone fracture, chronic liver disease, hypothyroid, type 1 diabetes, family history of osteoporosis, cervical spine surgery,   scoliosis.     TECHNIQUE/EXAM DESCRIPTION AND NUMBER OF VIEWS:  Dual x-ray bone densitometry was performed from the distal left forearm and from the proximal left femur utilizing the GE Prodigy unit.     COMPARISON: Bone densitometry scan 2018 of left femur.     FINDINGS:  The distal left forearm has a mean bone mineral density of 0.724 g/cm2, with a T score of -1.7 and a Z score of -0.4.     The proximal left femur has a mean bone mineral density of 0.808 g/cm2, with a T score of -1.6 and a Z score of -0.1.     When compared with the most recent study dated 2018, there has been a 6.6% decrease in the bone mineral density of the proximal left femur.     IMPRESSION:     According to the World Health Organization classification, bone mineral density of this patient is osteopenic in the left forearm and femur and there has been significant worsening density in the femur.     10-year Probability of Fracture:  Major Osteoporotic     12.6%  Hip     2.4%  Population      [place] ()     Based on left femur neck BMD         ASSESSMENT/PLAN:     1. Type 1 diabetes mellitus on insulin therapy (HCC)  Uncontrolled secondary to hyperglycemia  She is not a candidate for pump therapy because of noncompliance  We have limited data today because she was not in the office and she did not bring her CGM  Continue Toujeo 28u daily  Continue Fiasp 1:5 grams  Continue correction scale 1:50> 150  Recommend follow-up in the " office in 3 months    2. Age-related osteoporosis with current pathological fracture with routine healing  Unstable  She has clinical osteoporosis because of fragility fractures.  She reports that her primary care is going to schedule her for a bone density on May 2024  I recommend starting Prolia injections every 6 months because of osteoporosis  I explained to the patient Prolia is the only medication that is safe for osteoporosis in patients with advanced chronic kidney disease  We discussed the risk of hypocalcemia with Prolia and the importance of adequate calcium and vitamin D intake  Discussed the importance of regular weightbearing exercise  Discussed the importance of good dental hygiene and regular dental visits  Discussed the importance of adequate calcium and vitamin supplementation  Reviewed fall precautions  She should notify me if she has any falls or fractures  Her bone density should be repeated by her primary care on May 2, 2024    3. Hypothyroidism, postsurgical  Uncontrolled  She has a history of noncompliance with thyroid hormone replacement therapy  Continue levothyroxine 250 mcg daily and Cytomel 5 mcg daily  Repeat thyroid labs in 3 months    4. Dyslipidemia  Controlled  Continue atorvastatin  Repeat fasting lipids in 3 to 6 months    5. Vitamin D deficiency  Unstable  We will check calcium vitamin D with next labs in 3 months    6. Long-term insulin use (HCC)  Patient is on long-term basal bolus therapy for type 1 diabetes    7. History of medication noncompliance  Patient has a history of noncompliance with medications      Return in about 3 months (around 7/11/2024).      Total time spent on day of service was over 60 minutes which included obtaining a detailed history and physical exam, ordering labs, coordinating care and scheduling future follow-up    This patient during there office visit today was started on a new medication.  Side effects of the new medication were discussed with the  patient today in the office.     Thank you kindly for allowing me to participate in the diabetes care plan for this patient.    Josue Paz MD, FACE, [facility]      CC:   Jarrell Yates M.D.

## 2024-04-24 ENCOUNTER — TELEPHONE (OUTPATIENT)
Dept: ENDOCRINOLOGY | Facility: MEDICAL CENTER | Age: 69
End: 2024-04-24
Payer: MEDICARE

## 2024-04-24 DIAGNOSIS — E10.9 TYPE 1 DIABETES MELLITUS ON INSULIN THERAPY (HCC): ICD-10-CM

## 2024-04-24 RX ORDER — BLOOD-GLUCOSE SENSOR
1 EACH MISCELLANEOUS
Qty: 6 EACH | Refills: 3 | Status: SHIPPED | OUTPATIENT
Start: 2024-04-24

## 2024-04-24 NOTE — TELEPHONE ENCOUNTER
VOICEMAIL  1. Caller Name: Anu Manuel                      Call Back Number: There are no phone numbers on file.      2. Message: Patient left voicemail stating she was in need of new FreeStyle John 3 sensors and her pharmacy had notified her there were no more refills available to her. She will like to please have this order sent to her pharmacy on file [facility]  [place],   Please advise    3. Patient approves office to leave a detailed voicemail/MyChart message: N\A

## 2024-05-07 ENCOUNTER — OUTPATIENT INFUSION SERVICES (OUTPATIENT)
Dept: ONCOLOGY | Facility: MEDICAL CENTER | Age: 69
End: 2024-05-07
Attending: INTERNAL MEDICINE
Payer: MEDICARE

## 2024-05-07 VITALS
HEIGHT: 66 IN | WEIGHT: 138.01 LBS | HEART RATE: 78 BPM | RESPIRATION RATE: 18 BRPM | SYSTOLIC BLOOD PRESSURE: 144 MMHG | TEMPERATURE: 97.7 F | DIASTOLIC BLOOD PRESSURE: 72 MMHG | OXYGEN SATURATION: 95 % | BODY MASS INDEX: 22.18 KG/M2

## 2024-05-07 DIAGNOSIS — M80.00XD AGE-RELATED OSTEOPOROSIS WITH CURRENT PATHOLOGICAL FRACTURE WITH ROUTINE HEALING: ICD-10-CM

## 2024-05-07 LAB
CA-I BLD ISE-SCNC: 1.06 MMOL/L (ref 1.1–1.3)
CREAT BLD-MCNC: 2 MG/DL (ref 0.5–1.4)

## 2024-05-07 RX ORDER — DIPHENHYDRAMINE HYDROCHLORIDE 50 MG/ML
50 INJECTION INTRAMUSCULAR; INTRAVENOUS PRN
OUTPATIENT
Start: 2024-11-03

## 2024-05-07 RX ORDER — PRAMIPEXOLE DIHYDROCHLORIDE 0.12 MG/1
0.25 TABLET ORAL EVERY EVENING
COMMUNITY

## 2024-05-07 RX ORDER — EPINEPHRINE 1 MG/ML(1)
0.5 AMPUL (ML) INJECTION PRN
OUTPATIENT
Start: 2024-11-03

## 2024-05-07 RX ORDER — METHYLPREDNISOLONE SODIUM SUCCINATE 125 MG/2ML
125 INJECTION, POWDER, LYOPHILIZED, FOR SOLUTION INTRAMUSCULAR; INTRAVENOUS PRN
OUTPATIENT
Start: 2024-11-03

## 2024-05-07 RX ADMIN — DENOSUMAB 60 MG: 60 INJECTION SUBCUTANEOUS at 11:03

## 2024-05-07 ASSESSMENT — FIBROSIS 4 INDEX: FIB4 SCORE: 2.4

## 2024-05-07 NOTE — PROGRESS NOTES
Pt arrived ambulatory to \[facility]\"" for 1st dose Prolia for osteoporosis. POC discussed with pt and she agrees with plan.     Pt denies oral surgery in the past 4 weeks. I-stat labs drawn as ordered. Results reviewed, creatinine elevated above parameter. Pharmacist Juan Foster got the ok to treat from Dr Najjar. Pt does not take calcium supplement. Pt to check with provide before starting calcium. Pt denies muscle spasms, no paresthesias.     Pt medicated per MAR. Prolia given LLQ abd. Pt tolerated injection without s/s adverse reaction.     Pt discharged to self care, [facility]. Scheduling emailed for next 6 month appointment. Pt will monitor My Chart for appointment information.

## 2024-05-09 ENCOUNTER — OFFICE VISIT (OUTPATIENT)
Dept: VASCULAR SURGERY | Facility: MEDICAL CENTER | Age: 69
End: 2024-05-09
Payer: MEDICARE

## 2024-05-09 VITALS
DIASTOLIC BLOOD PRESSURE: 72 MMHG | HEIGHT: 66 IN | OXYGEN SATURATION: 95 % | BODY MASS INDEX: 22.18 KG/M2 | WEIGHT: 138 LBS | HEART RATE: 70 BPM | TEMPERATURE: 98.3 F | SYSTOLIC BLOOD PRESSURE: 140 MMHG

## 2024-05-09 DIAGNOSIS — J44.9 CHRONIC OBSTRUCTIVE PULMONARY DISEASE, UNSPECIFIED COPD TYPE (HCC): ICD-10-CM

## 2024-05-09 DIAGNOSIS — N18.6 ESRD (END STAGE RENAL DISEASE) (HCC): ICD-10-CM

## 2024-05-09 DIAGNOSIS — E78.5 DYSLIPIDEMIA: ICD-10-CM

## 2024-05-09 DIAGNOSIS — I10 PRIMARY HYPERTENSION: ICD-10-CM

## 2024-05-09 PROCEDURE — 3078F DIAST BP <80 MM HG: CPT | Performed by: SURGERY

## 2024-05-09 PROCEDURE — 3077F SYST BP >= 140 MM HG: CPT | Performed by: SURGERY

## 2024-05-09 PROCEDURE — 99204 OFFICE O/P NEW MOD 45 MIN: CPT | Performed by: SURGERY

## 2024-05-09 ASSESSMENT — FIBROSIS 4 INDEX: FIB4 SCORE: 2.4

## 2024-05-09 NOTE — PROGRESS NOTES
"  VASCULAR SURGERY SERVICE  CONSULT NOTE      Date: 5/9/2024    Referring Provider: Maye Anderson MD    Consulting Physician: Denis Brown MD - [facility]     -------------------------------------------------------------------------------------------------    Reason for consultation:  Peritoneal dialysis catheter placement.    HPI:  This is a 69 y.o. female with multiple medical problems including end-stage renal disease who has been on hemodialysis for the last 5 weeks via a right IJ permacath.  Patient is on hemodialysis Monday, Wednesday, and Friday from 3 to 6 PM.  She is referred to vascular service for peritoneal dialysis catheter placement.  Patient is status post laparoscopic hysterectomy and cholecystectomy.  She sustained hip fracture recently which was treated nonoperatively.  She also has history of hepatomegaly and nonalcoholic cirrhosis but no ascites.  She smoked half a pack a day.    Past Medical History:   Diagnosis Date    Adverse effect of anesthesia     in 2008 \"throat closes up\"\"anxiety\" ?laryngospasm, kept in ICU. Pt states no problems currently 2018.     Anemia     Anesthesia     in 2008 \"throat closes up\"\"anxiety\" ?laryngospasm, kept in ICU. Pt states no problems currently 2018.     Arthritis     right shoulder, hands    Bowel habit changes More frequent    Cataract 2022    Cigarette smoker quit 2013    Dental disorder     missing teeth; Partial plate on top    depression 08/30/2016    denies depression, states has anxiety and panic attacks    Diabetes mellitus type 1 (HCC) 1989    insulin    Dialysis patient (HCC) Short time due to a kidney injury from a infection    Encounter for long-term (current) use of insulin (HCC) 09/25/2013    GI bleed     Heart burn     High cholesterol     Hypertension     Hypothyroidism, postsurgical 1970    Indigestion     Infectious disease      had hepatitis C, tested neg.    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, uncontrolled(250.63) 06/10/2015       Past Surgical History:   Procedure Laterality Date    TN ERCP,DIAGNOSTIC N/A 01/14/2022    Procedure: ERCP, DIAGNOSTIC - WITH SIGMOID COLON BIOPSY AND STENT REMOVAL;  Surgeon: Cr Haro M.D.;  Location: SURGERY [facility];  Service: Gastroenterology    THADDEUS BY LAPAROSCOPY N/A 10/28/2021    Procedure: CHOLECYSTECTOMY, LAPAROSCOPIC;  Surgeon: Tello Trammell M.D.;  Location: [facility];  Service: General    TN ERCP,DIAGNOSTIC N/A 10/26/2021    Procedure: ERCP (ENDOSCOPIC RETROGRADE CHOLANGIOPANCREATOGRAPHY);  Surgeon: Cr Haro M.D.;  Location: SURGERY SAME DAY [facility];  Service: Gastroenterology    TN UPPER GI ENDOSCOPY,BIOPSY N/A 10/26/2021    Procedure: GASTROSCOPY, WITH BIOPSY;  Surgeon: Cr Haro M.D.;  Location: SURGERY SAME DAY [facility];  Service: Gastroenterology    TN UPPER GI ENDOSCOPY,DIAGNOSIS N/A 10/26/2021    Procedure: GASTROSCOPY;  Surgeon: Cr Haro M.D.;  Location: SURGERY SAME DAY [facility];  Service: Gastroenterology    CATH PLACEMENT  01/25/2020    Procedure: INSERTION, CATHETER PERM;  Surgeon: Rola Mendoza M.D.;  Location: [facility];  Service: General    IRRIGATION & DEBRIDEMENT NEURO  01/19/2020    Procedure: IRRIGATION AND DEBRIDEMENT, WOUND THORACIC AND LUMBAR;  Surgeon: Ryan Roman M.D.;  Location: [facility];  Service: Neurosurgery    TN INS/RPLC SPI NPG/RCVR POCKET N/A 12/16/2019    Procedure: EXPLORATION AT THORACIC 8 - 9, REPLACEMENT OF  NEUROSTIMULATOR LEAD;  Surgeon: Ryan Roman M.D.;  Location: [facility];  Service: Neurosurgery    SPINAL CORD STIMULATOR N/A 10/26/2018    Procedure: SPINAL CORD STIMULATOR;  Surgeon: Ryan Roman M.D.;  " Location: SURGERY [facility];  Service: Neurosurgery    THORACIC LAMINECTOMY N/A 10/26/2018    Procedure: THORACIC LAMINECTOMY - FOR;  Surgeon: Ryan Roman M.D.;  Location: SURGERY [facility];  Service: Neurosurgery    CO NJX AA&/STRD TFRML EPI LUMBAR/SACRAL 1 LEVEL Right 08/31/2016    Procedure: INJ-FORAMEN EPI LUM/SAC SNGL L4-5;  Surgeon: Sukhi Godfrey M.D.;  Location: SURGERY [facility];  Service: Pain Management    LUMBAR LAMINECTOMY DISKECTOMY Right 05/10/2016    Procedure: LUMBAR L4-5 HEMILAMINECTOMY DISKECTOMY ;  Surgeon: Arnold Keyes M.D.;  Location: SURGERY [facility];  Service:     CERVICAL FUSION POSTERIOR  01/16/2009    Performed by TARA CONTRERAS at [facility]    HARDWARE REMOVAL NEURO  01/16/2009    Performed by TARA CONTRERAS at [facility]    NECK EXPLORATION  01/16/2009    Performed by TARA CONTRERAS at [facility]    SHOULDER ARTHROSCOPY W/ ROTATOR CUFF REPAIR  10/09/2008    Performed by SHERLY CASTANEDA at [facility]    SHOULDER DECOMPRESSION ARTHROSCOPIC  06/17/2008    Performed by SHERLY CASTANEDA at [facility]    CLAVICLE DISTAL EXCISION  06/17/2008    Performed by SHERLY CASTANEDA at [facility]    CERVICAL DISK AND FUSION ANTERIOR  03/12/2008    HYSTERECTOMY, VAGINAL  2006    APPENDECTOMY  2004    THYROID LOBECTOMY  1973    TONSILLECTOMY  1963    LUMPECTOMY  1976, 2005    Breast     LUMPECTOMY      OTHER ABDOMINAL SURGERY  2004    OTHER CARDIAC SURGERY  2020 stents inserted       Current Outpatient Medications   Medication Sig Dispense Refill    pramipexole (MIRAPEX) 0.125 MG Tab Take 0.125 mg by mouth every day.      Continuous Glucose Sensor (FREESTYLE PARISA 3 SENSOR) Misc 1 Each every 14 days. 90 day 6 Each 3    SYNTHROID 125 MCG Tab Take 2 Tablets by mouth every morning on an empty stomach. 180 Tablet 2    insulin glargine (LANTUS SOLOSTAR) 100 UNIT/ML Solution Pen-injector injection  Inject 35 Units under the skin every evening. 15 mL 5    torsemide (DEMADEX) 100 MG Tab Take 1 Tablet by mouth every day. 30 Tablet 3    albuterol 108 (90 Base) MCG/ACT Aero Soln inhalation aerosol Inhale 2 Puffs every 6 hours as needed for Shortness of Breath.      amLODIPine (NORVASC) 5 MG Tab Take 5 mg by mouth every day.      metoprolol SR (TOPROL XL) 50 MG TABLET SR 24 HR Take 200 mg by mouth every day. 4 tablets=200mg      Cyanocobalamin (VITAMIN B-12 PO) Take 1 Tablet by mouth every day.      umeclidinium-vilanterol (ANORO ELLIPTA) 62.5-25 MCG/ACT AEROSOL POWDER, BREATH ACTIVATED inhaler Inhale 1 Puff every day. 60 Each 0    senna-docusate (SENOKOT S) 8.6-50 MG Tab Take 1 Tablet by mouth every day.      oxycodone (OXY-IR) 15 MG immediate release tablet Take 15 mg by mouth every four hours as needed for Severe Pain.      venlafaxine (EFFEXOR-XR) 150 MG extended-release capsule Take 150 mg by mouth every day.      liothyronine (CYTOMEL) 5 MCG Tab Take 1 Tablet by mouth every day. 90 Tablet 3    famotidine (PEPCID) 20 MG Tab TAKE 1 TABLET BY MOUTH TWICE A DAY (Patient taking differently: Take 20 mg by mouth 2 times a day.) 180 Tablet 3    atorvastatin (LIPITOR) 40 MG Tab Take 1 Tablet by mouth every evening. 100 Tablet 3    fenofibrate (TRICOR) 48 MG Tab Take 48 mg by mouth every morning.      ondansetron (ZOFRAN ODT) 8 MG TABLET DISPERSIBLE Take 8 mg by mouth every 8 hours as needed for Nausea.      omeprazole (PRILOSEC) 40 MG delayed-release capsule Take 40 mg by mouth every day.      ursodiol (ACTIGALL) 300 MG Cap Take 300-600 mg by mouth 2 times a day. 1 capsule qam & 2 caps qpm      VITAMIN D, ERGOCALCIFEROL, PO Take 1 Tablet by mouth 2 times a day.      traZODone (DESYREL) 150 MG Tab Take 150 mg by mouth at bedtime.       No current facility-administered medications for this visit.       Social History     Socioeconomic History    Marital status:      Spouse name: Not on file    Number of children:  Not on file    Years of education: Not on file    Highest education level: Not on file   Occupational History    Not on file   Tobacco Use    Smoking status: Every Day     Current packs/day: 0.50     Average packs/day: 0.5 packs/day for 30.0 years (15.0 ttl pk-yrs)     Types: Cigarettes    Smokeless tobacco: Never   Vaping Use    Vaping Use: Never used   Substance and Sexual Activity    Alcohol use: Not Currently    Drug use: Not Currently     Types: Inhaled, Oral, Marijuana     Comment: Marijuana - Occasional; edibles    Sexual activity: Not Currently   Other Topics Concern    Not on file   Social History Narrative    Not on file     Social Determinants of Health     Financial Resource Strain: Low Risk  (11/1/2021)    Overall Financial Resource Strain (CARDIA)     Difficulty of Paying Living Expenses: Not very hard   Food Insecurity: No Food Insecurity (11/1/2021)    Hunger Vital Sign     Worried About Running Out of Food in the Last Year: Never true     Ran Out of Food in the Last Year: Never true   Transportation Needs: No Transportation Needs (11/1/2021)    PRAPARE - Transportation     Lack of Transportation (Medical): No     Lack of Transportation (Non-Medical): No   Physical Activity: Inactive (6/23/2020)    Exercise Vital Sign     Days of Exercise per Week: 0 days     Minutes of Exercise per Session: 0 min   Stress: No Stress Concern Present (6/23/2020)    [employer] [place] [employer] - Occupational Stress Questionnaire     Feeling of Stress : Not at all   Social Connections: Moderately Isolated (6/23/2020)    Social Connection and Isolation Panel [NHANES]     Frequency of Communication with Friends and Family: More than three times a week     Frequency of Social Gatherings with Friends and Family: More than three times a week     Attends [employer] Services: Never     Active Member of Clubs or Organizations: No     Attends Club or Organization Meetings: Never     Marital Status:    Intimate  "Partner Violence: Not At Risk (6/23/2020)    Humiliation, Afraid, Rape, and Kick questionnaire     Fear of Current or Ex-Partner: No     Emotionally Abused: No     Physically Abused: No     Sexually Abused: No   Housing Stability: Not on file       Family History   Problem Relation Age of Onset    Hypertension Mother     Cancer Father        Allergies:  Tape    Review of Systems:    Constitutional: Negative for fever, chills, weight loss,   HENT:    Negative for hearing loss or tinnitus    Eyes:    Negative for blurred vision, double vision, or loss of vision  Respiratory:  Positive for shortness of breath on exertion Cardiac:  Negative for chest pain or palpitations or orthopnea  Vascular:  Negative for claudication or rest pain   Gastrointestinal: Negative for nausea, vomiting, or abdominal pain     Negative for hematochezia or melena   Genitourinary: Negative for dysuria, frequency, or hematuria   Musculoskeletal: Positive for hip and joint pain  Skin:   Negative for itching or rash  Neurological:  Negative for dizziness, headaches, or tremors     Negative for speech disturbance     Negative for extremity weakness or paresthesias  Endo/Heme:  Negative for easy bruising or bleeding  Psychiatric:  Negative for depression, suicidal ideas, or hallucinations    Physical Exam:  BP (!) 140/72 (BP Location: Right arm, Patient Position: Sitting, BP Cuff Size: Adult)   Pulse 70   Temp 36.8 °C (98.3 °F) (Temporal)   Ht 1.676 m (5' 6\")   Wt 62.6 kg (138 lb)   SpO2 95%     Constitutional: Pleasant female in nonapparent distress, ambulating using a walker.    HEENT:  Normocephalic and atraumatic, EOMI  Neck:   Supple, no JVD right IJ permacath in place.  Cardiovascular: Regular rate and rhythm  Pulmonary:  Good air entry bilaterally  Abdominal:  Soft, non-tender, non-distended     Aortic impulse not widened  Musculoskeletal: No edema, no tenderness  Neurological:  CN II-XII grossly intact, no focal deficits  Skin:   Skin " is warm and dry. No rash noted.  Psychiatric:  Normal mood and affect.  Lower extremities:    Feet are warm, well-perfused.    Labs:  Reviewed.    Radiology: CT scan showed hepatomegaly but no ascites.    Assessment:  -End-stage renal disease.  -COPD.  -Dyslipidemia.  -Type 1 diabetes mellitus.  -Hypertension.    -Hypothyroidism.      Plan:  I had a long discussion with patient.  I discussed with her the option of undergoing laparoscopic placement of peritoneal dialysis catheter.  Risks and benefits were discussed.  Risks include, but not limited to, bleeding, infection, intestinal injury, catheter malplacement, catheter malfunction, and perioperative anesthetic complications.  Patient indicated understanding and would like to proceed.  All questions were answered.  At her request, the procedure will be performed on May 23, 2024.,  Pending operating room availability.    I counseled patient to stop smoking.      Denis Brown MD  [facility]   [place] preferred or call my office [phone]  __________________________________________________________________  Patient:Anu Manuel   MRN:[id number]   CSN:[id number]

## 2024-05-14 ENCOUNTER — TELEPHONE (OUTPATIENT)
Dept: VASCULAR SURGERY | Facility: MEDICAL CENTER | Age: 69
End: 2024-05-14
Payer: MEDICARE

## 2024-05-21 ENCOUNTER — APPOINTMENT (OUTPATIENT)
Dept: ADMISSIONS | Facility: MEDICAL CENTER | Age: 69
End: 2024-05-21
Attending: SURGERY
Payer: MEDICARE

## 2024-05-28 ENCOUNTER — PRE-ADMISSION TESTING (OUTPATIENT)
Dept: ADMISSIONS | Facility: MEDICAL CENTER | Age: 69
End: 2024-05-28
Attending: SURGERY
Payer: MEDICARE

## 2024-05-28 RX ORDER — INSULIN LISPRO 100 [IU]/ML
2-10 INJECTION, SOLUTION INTRAVENOUS; SUBCUTANEOUS 3 TIMES DAILY
COMMUNITY

## 2024-05-28 RX ORDER — METOPROLOL SUCCINATE 100 MG/1
100 TABLET, EXTENDED RELEASE ORAL DAILY
COMMUNITY

## 2024-05-28 NOTE — OR NURSING
RN tele pre admit appointment completed with patient:  Medication instructions given according to the Guideline for Pre-Operative Medication Management. Copy of medication list sent via mailing address that patient gave during pre admit appointment, not mailing address on file.  Preparing For Your Procedure packet reviewed with patient, including medications, fasting and bathing guidelines.  Surgery date 6/20/2024.  Patient verbalized an understanding of the above instructions.

## 2024-06-18 ENCOUNTER — TELEPHONE (OUTPATIENT)
Dept: VASCULAR SURGERY | Facility: MEDICAL CENTER | Age: 69
End: 2024-06-18
Payer: MEDICARE

## 2024-06-19 ENCOUNTER — ANESTHESIA EVENT (OUTPATIENT)
Dept: SURGERY | Facility: MEDICAL CENTER | Age: 69
End: 2024-06-19
Payer: MEDICARE

## 2024-06-20 ENCOUNTER — ANESTHESIA (OUTPATIENT)
Dept: SURGERY | Facility: MEDICAL CENTER | Age: 69
End: 2024-06-20
Payer: MEDICARE

## 2024-06-20 ENCOUNTER — HOSPITAL ENCOUNTER (OUTPATIENT)
Facility: MEDICAL CENTER | Age: 69
End: 2024-06-20
Attending: SURGERY | Admitting: SURGERY
Payer: MEDICARE

## 2024-06-20 VITALS
OXYGEN SATURATION: 97 % | WEIGHT: 125.44 LBS | DIASTOLIC BLOOD PRESSURE: 76 MMHG | RESPIRATION RATE: 18 BRPM | HEIGHT: 66 IN | TEMPERATURE: 97.2 F | BODY MASS INDEX: 20.16 KG/M2 | HEART RATE: 79 BPM | SYSTOLIC BLOOD PRESSURE: 110 MMHG

## 2024-06-20 LAB
ANION GAP SERPL CALC-SCNC: 10 MMOL/L (ref 7–16)
BUN SERPL-MCNC: 17 MG/DL (ref 8–22)
CALCIUM SERPL-MCNC: 7.9 MG/DL (ref 8.5–10.5)
CHLORIDE SERPL-SCNC: 101 MMOL/L (ref 96–112)
CO2 SERPL-SCNC: 27 MMOL/L (ref 20–33)
CREAT SERPL-MCNC: 1.81 MG/DL (ref 0.5–1.4)
EKG IMPRESSION: NORMAL
ERYTHROCYTE [DISTWIDTH] IN BLOOD BY AUTOMATED COUNT: 52.8 FL (ref 35.9–50)
EST. AVERAGE GLUCOSE BLD GHB EST-MCNC: 217 MG/DL
GFR SERPLBLD CREATININE-BSD FMLA CKD-EPI: 30 ML/MIN/1.73 M 2
GLUCOSE BLD STRIP.AUTO-MCNC: 160 MG/DL (ref 65–99)
GLUCOSE SERPL-MCNC: 218 MG/DL (ref 65–99)
HBA1C MFR BLD: 9.2 % (ref 4–5.6)
HCT VFR BLD AUTO: 40.6 % (ref 37–47)
HGB BLD-MCNC: 13 G/DL (ref 12–16)
INR PPP: 0.91 (ref 0.87–1.13)
MCH RBC QN AUTO: 29.8 PG (ref 27–33)
MCHC RBC AUTO-ENTMCNC: 32 G/DL (ref 32.2–35.5)
MCV RBC AUTO: 93.1 FL (ref 81.4–97.8)
PLATELET # BLD AUTO: 193 K/UL (ref 164–446)
PMV BLD AUTO: 10.2 FL (ref 9–12.9)
POTASSIUM SERPL-SCNC: 4.9 MMOL/L (ref 3.6–5.5)
PROTHROMBIN TIME: 12.4 SEC (ref 12–14.6)
RBC # BLD AUTO: 4.36 M/UL (ref 4.2–5.4)
SODIUM SERPL-SCNC: 138 MMOL/L (ref 135–145)
WBC # BLD AUTO: 7.2 K/UL (ref 4.8–10.8)

## 2024-06-20 PROCEDURE — A9270 NON-COVERED ITEM OR SERVICE: HCPCS | Performed by: STUDENT IN AN ORGANIZED HEALTH CARE EDUCATION/TRAINING PROGRAM

## 2024-06-20 PROCEDURE — 160028 HCHG SURGERY MINUTES - 1ST 30 MINS LEVEL 3: Performed by: SURGERY

## 2024-06-20 PROCEDURE — 80048 BASIC METABOLIC PNL TOTAL CA: CPT

## 2024-06-20 PROCEDURE — 700102 HCHG RX REV CODE 250 W/ 637 OVERRIDE(OP): Performed by: STUDENT IN AN ORGANIZED HEALTH CARE EDUCATION/TRAINING PROGRAM

## 2024-06-20 PROCEDURE — 83036 HEMOGLOBIN GLYCOSYLATED A1C: CPT

## 2024-06-20 PROCEDURE — 49324 LAP INSERT TUNNEL IP CATH: CPT | Performed by: SURGERY

## 2024-06-20 PROCEDURE — C1750 CATH, HEMODIALYSIS,LONG-TERM: HCPCS | Performed by: SURGERY

## 2024-06-20 PROCEDURE — 160046 HCHG PACU - 1ST 60 MINS PHASE II: Performed by: SURGERY

## 2024-06-20 PROCEDURE — 160009 HCHG ANES TIME/MIN: Performed by: SURGERY

## 2024-06-20 PROCEDURE — 160002 HCHG RECOVERY MINUTES (STAT): Performed by: SURGERY

## 2024-06-20 PROCEDURE — 82962 GLUCOSE BLOOD TEST: CPT

## 2024-06-20 PROCEDURE — 93010 ELECTROCARDIOGRAM REPORT: CPT | Performed by: INTERNAL MEDICINE

## 2024-06-20 PROCEDURE — 93005 ELECTROCARDIOGRAM TRACING: CPT | Performed by: SURGERY

## 2024-06-20 PROCEDURE — 700101 HCHG RX REV CODE 250: Performed by: STUDENT IN AN ORGANIZED HEALTH CARE EDUCATION/TRAINING PROGRAM

## 2024-06-20 PROCEDURE — 160036 HCHG PACU - EA ADDL 30 MINS PHASE I: Performed by: SURGERY

## 2024-06-20 PROCEDURE — 700111 HCHG RX REV CODE 636 W/ 250 OVERRIDE (IP): Performed by: STUDENT IN AN ORGANIZED HEALTH CARE EDUCATION/TRAINING PROGRAM

## 2024-06-20 PROCEDURE — 160035 HCHG PACU - 1ST 60 MINS PHASE I: Performed by: SURGERY

## 2024-06-20 PROCEDURE — 160025 RECOVERY II MINUTES (STATS): Performed by: SURGERY

## 2024-06-20 PROCEDURE — 700111 HCHG RX REV CODE 636 W/ 250 OVERRIDE (IP): Performed by: SURGERY

## 2024-06-20 PROCEDURE — 85610 PROTHROMBIN TIME: CPT

## 2024-06-20 PROCEDURE — 160048 HCHG OR STATISTICAL LEVEL 1-5: Performed by: SURGERY

## 2024-06-20 PROCEDURE — 85027 COMPLETE CBC AUTOMATED: CPT

## 2024-06-20 PROCEDURE — 36415 COLL VENOUS BLD VENIPUNCTURE: CPT

## 2024-06-20 PROCEDURE — 700105 HCHG RX REV CODE 258: Performed by: SURGERY

## 2024-06-20 PROCEDURE — 160039 HCHG SURGERY MINUTES - EA ADDL 1 MIN LEVEL 3: Performed by: SURGERY

## 2024-06-20 PROCEDURE — 700101 HCHG RX REV CODE 250: Performed by: SURGERY

## 2024-06-20 PROCEDURE — 700105 HCHG RX REV CODE 258: Performed by: STUDENT IN AN ORGANIZED HEALTH CARE EDUCATION/TRAINING PROGRAM

## 2024-06-20 PROCEDURE — 110371 HCHG SHELL REV 272: Performed by: SURGERY

## 2024-06-20 DEVICE — IMPLANTABLE DEVICE: Type: IMPLANTABLE DEVICE | Site: ABDOMEN | Status: FUNCTIONAL

## 2024-06-20 RX ORDER — LIDOCAINE HYDROCHLORIDE 40 MG/ML
SOLUTION TOPICAL PRN
Status: DISCONTINUED | OUTPATIENT
Start: 2024-06-20 | End: 2024-06-20 | Stop reason: SURG

## 2024-06-20 RX ORDER — EPHEDRINE SULFATE 50 MG/ML
5 INJECTION, SOLUTION INTRAVENOUS
Status: DISCONTINUED | OUTPATIENT
Start: 2024-06-20 | End: 2024-06-20 | Stop reason: HOSPADM

## 2024-06-20 RX ORDER — HYDRALAZINE HYDROCHLORIDE 20 MG/ML
5 INJECTION INTRAMUSCULAR; INTRAVENOUS
Status: DISCONTINUED | OUTPATIENT
Start: 2024-06-20 | End: 2024-06-20 | Stop reason: HOSPADM

## 2024-06-20 RX ORDER — CEFAZOLIN SODIUM 1 G/3ML
INJECTION, POWDER, FOR SOLUTION INTRAMUSCULAR; INTRAVENOUS PRN
Status: DISCONTINUED | OUTPATIENT
Start: 2024-06-20 | End: 2024-06-20 | Stop reason: SURG

## 2024-06-20 RX ORDER — LIDOCAINE HYDROCHLORIDE 20 MG/ML
INJECTION, SOLUTION EPIDURAL; INFILTRATION; INTRACAUDAL; PERINEURAL PRN
Status: DISCONTINUED | OUTPATIENT
Start: 2024-06-20 | End: 2024-06-20 | Stop reason: SURG

## 2024-06-20 RX ORDER — OXYCODONE HCL 5 MG/5 ML
10 SOLUTION, ORAL ORAL
Status: COMPLETED | OUTPATIENT
Start: 2024-06-20 | End: 2024-06-20

## 2024-06-20 RX ORDER — HYDROMORPHONE HYDROCHLORIDE 1 MG/ML
0.1 INJECTION, SOLUTION INTRAMUSCULAR; INTRAVENOUS; SUBCUTANEOUS
Status: DISCONTINUED | OUTPATIENT
Start: 2024-06-20 | End: 2024-06-20 | Stop reason: HOSPADM

## 2024-06-20 RX ORDER — ONDANSETRON 2 MG/ML
4 INJECTION INTRAMUSCULAR; INTRAVENOUS
Status: DISCONTINUED | OUTPATIENT
Start: 2024-06-20 | End: 2024-06-20 | Stop reason: HOSPADM

## 2024-06-20 RX ORDER — SODIUM CHLORIDE, SODIUM LACTATE, POTASSIUM CHLORIDE, CALCIUM CHLORIDE 600; 310; 30; 20 MG/100ML; MG/100ML; MG/100ML; MG/100ML
INJECTION, SOLUTION INTRAVENOUS CONTINUOUS
Status: ACTIVE | OUTPATIENT
Start: 2024-06-20 | End: 2024-06-20

## 2024-06-20 RX ORDER — SODIUM CHLORIDE, SODIUM LACTATE, POTASSIUM CHLORIDE, CALCIUM CHLORIDE 600; 310; 30; 20 MG/100ML; MG/100ML; MG/100ML; MG/100ML
INJECTION, SOLUTION INTRAVENOUS CONTINUOUS
Status: DISCONTINUED | OUTPATIENT
Start: 2024-06-20 | End: 2024-06-20 | Stop reason: HOSPADM

## 2024-06-20 RX ORDER — SODIUM CHLORIDE 9 MG/ML
INJECTION, SOLUTION INTRAVENOUS
Status: DISCONTINUED | OUTPATIENT
Start: 2024-06-20 | End: 2024-06-20 | Stop reason: SURG

## 2024-06-20 RX ORDER — BLOOD-GLUCOSE METER
1 KIT MISCELLANEOUS
COMMUNITY

## 2024-06-20 RX ORDER — SODIUM CHLORIDE 9 MG/ML
INJECTION, SOLUTION INTRAVENOUS ONCE
Status: COMPLETED | OUTPATIENT
Start: 2024-06-20 | End: 2024-06-20

## 2024-06-20 RX ORDER — HYDROMORPHONE HYDROCHLORIDE 1 MG/ML
0.4 INJECTION, SOLUTION INTRAMUSCULAR; INTRAVENOUS; SUBCUTANEOUS
Status: DISCONTINUED | OUTPATIENT
Start: 2024-06-20 | End: 2024-06-20 | Stop reason: HOSPADM

## 2024-06-20 RX ORDER — BUPIVACAINE HYDROCHLORIDE AND EPINEPHRINE 5; 5 MG/ML; UG/ML
INJECTION, SOLUTION EPIDURAL; INTRACAUDAL; PERINEURAL
Status: DISCONTINUED | OUTPATIENT
Start: 2024-06-20 | End: 2024-06-20 | Stop reason: HOSPADM

## 2024-06-20 RX ORDER — DIPHENHYDRAMINE HYDROCHLORIDE 50 MG/ML
12.5 INJECTION INTRAMUSCULAR; INTRAVENOUS
Status: DISCONTINUED | OUTPATIENT
Start: 2024-06-20 | End: 2024-06-20 | Stop reason: HOSPADM

## 2024-06-20 RX ORDER — HYDROMORPHONE HYDROCHLORIDE 1 MG/ML
0.2 INJECTION, SOLUTION INTRAMUSCULAR; INTRAVENOUS; SUBCUTANEOUS
Status: DISCONTINUED | OUTPATIENT
Start: 2024-06-20 | End: 2024-06-20 | Stop reason: HOSPADM

## 2024-06-20 RX ORDER — ONDANSETRON 2 MG/ML
INJECTION INTRAMUSCULAR; INTRAVENOUS PRN
Status: DISCONTINUED | OUTPATIENT
Start: 2024-06-20 | End: 2024-06-20 | Stop reason: SURG

## 2024-06-20 RX ORDER — OXYCODONE HCL 5 MG/5 ML
5 SOLUTION, ORAL ORAL
Status: COMPLETED | OUTPATIENT
Start: 2024-06-20 | End: 2024-06-20

## 2024-06-20 RX ORDER — HALOPERIDOL 5 MG/ML
1 INJECTION INTRAMUSCULAR
Status: DISCONTINUED | OUTPATIENT
Start: 2024-06-20 | End: 2024-06-20 | Stop reason: HOSPADM

## 2024-06-20 RX ORDER — ROCURONIUM BROMIDE 10 MG/ML
INJECTION, SOLUTION INTRAVENOUS PRN
Status: DISCONTINUED | OUTPATIENT
Start: 2024-06-20 | End: 2024-06-20 | Stop reason: SURG

## 2024-06-20 RX ORDER — ACETAMINOPHEN 500 MG
1000 TABLET ORAL ONCE
Status: DISCONTINUED | OUTPATIENT
Start: 2024-06-20 | End: 2024-06-20 | Stop reason: HOSPADM

## 2024-06-20 RX ADMIN — ROCURONIUM BROMIDE 40 MG: 50 INJECTION, SOLUTION INTRAVENOUS at 14:41

## 2024-06-20 RX ADMIN — ONDANSETRON 4 MG: 2 INJECTION INTRAMUSCULAR; INTRAVENOUS at 15:13

## 2024-06-20 RX ADMIN — FENTANYL CITRATE 100 MCG: 50 INJECTION, SOLUTION INTRAMUSCULAR; INTRAVENOUS at 14:40

## 2024-06-20 RX ADMIN — PROPOFOL 30 MG: 10 INJECTION, EMULSION INTRAVENOUS at 15:06

## 2024-06-20 RX ADMIN — HYDROMORPHONE HYDROCHLORIDE 0.2 MG: 1 INJECTION, SOLUTION INTRAMUSCULAR; INTRAVENOUS; SUBCUTANEOUS at 16:30

## 2024-06-20 RX ADMIN — SODIUM CHLORIDE: 9 INJECTION, SOLUTION INTRAVENOUS at 11:50

## 2024-06-20 RX ADMIN — FENTANYL CITRATE 50 MCG: 50 INJECTION, SOLUTION INTRAMUSCULAR; INTRAVENOUS at 15:52

## 2024-06-20 RX ADMIN — OXYCODONE HYDROCHLORIDE 10 MG: 5 SOLUTION ORAL at 15:52

## 2024-06-20 RX ADMIN — CEFAZOLIN 2 G: 1 INJECTION, POWDER, FOR SOLUTION INTRAMUSCULAR; INTRAVENOUS at 14:40

## 2024-06-20 RX ADMIN — SUGAMMADEX 200 MG: 100 INJECTION, SOLUTION INTRAVENOUS at 15:13

## 2024-06-20 RX ADMIN — LIDOCAINE HYDROCHLORIDE 40 MG: 20 INJECTION, SOLUTION EPIDURAL; INFILTRATION; INTRACAUDAL at 14:40

## 2024-06-20 RX ADMIN — LIDOCAINE HYDROCHLORIDE 4 ML: 40 SOLUTION TOPICAL at 14:41

## 2024-06-20 RX ADMIN — HYDROMORPHONE HYDROCHLORIDE 0.4 MG: 1 INJECTION, SOLUTION INTRAMUSCULAR; INTRAVENOUS; SUBCUTANEOUS at 16:13

## 2024-06-20 RX ADMIN — PROPOFOL 130 MG: 10 INJECTION, EMULSION INTRAVENOUS at 14:40

## 2024-06-20 RX ADMIN — FENTANYL CITRATE 50 MCG: 50 INJECTION, SOLUTION INTRAMUSCULAR; INTRAVENOUS at 15:06

## 2024-06-20 RX ADMIN — SODIUM CHLORIDE: 9 INJECTION, SOLUTION INTRAVENOUS at 14:35

## 2024-06-20 RX ADMIN — FENTANYL CITRATE 50 MCG: 50 INJECTION, SOLUTION INTRAMUSCULAR; INTRAVENOUS at 16:00

## 2024-06-20 RX ADMIN — FENTANYL CITRATE 50 MCG: 50 INJECTION, SOLUTION INTRAMUSCULAR; INTRAVENOUS at 15:01

## 2024-06-20 ASSESSMENT — PAIN DESCRIPTION - PAIN TYPE
TYPE: SURGICAL PAIN

## 2024-06-20 ASSESSMENT — PAIN SCALES - GENERAL: PAIN_LEVEL: 1

## 2024-06-20 ASSESSMENT — FIBROSIS 4 INDEX: FIB4 SCORE: 2.4

## 2024-06-20 NOTE — DISCHARGE INSTRUCTIONS
HOME CARE INSTRUCTIONS    ACTIVITY: Rest and take it easy for the first 24 hours.  A responsible adult is recommended to remain with you during that time.  It is normal to feel sleepy.  We encourage you to not do anything that requires balance, judgment or coordination.    FOR 24 HOURS DO NOT:  Drive, operate machinery or run household appliances.  Drink beer or alcoholic beverages.  Make important decisions or sign legal documents.    SPECIAL INSTRUCTIONS:   Activities: As tolerated except no lifting more than 10 pounds for 3 weeks.  Medication: Resume home medication.  May take Tylenol as needed for mild pain.  Take Norco (patient has Norco at home) as needed for moderate pain.  Follow-up with dialysis center for flushing of the catheter.  Follow-up with Dr. Brown in 2 to 3 weeks.  Call [phone] for appointment and for any problem.    DIET: To avoid nausea, slowly advance diet as tolerated, avoiding spicy or greasy foods for the first day.  Add more substantial food to your diet according to your physician's instructions. INCREASE FLUIDS AND FIBER TO AVOID CONSTIPATION.    MEDICATIONS: Resume taking daily medication.  Take prescribed pain medication with food.  If no medication is prescribed, you may take non-aspirin pain medication if needed.  PAIN MEDICATION CAN BE VERY CONSTIPATING.  Take a stool softener or laxative such as senokot, pericolace, or milk of magnesia if needed.    Last pain medication given at 3:50 pm.    A follow-up appointment should be arranged with your doctor; call to schedule.    You should CALL YOUR PHYSICIAN if you develop:  Fever greater than 101 degrees F.  Pain not relieved by medication, or persistent nausea or vomiting.  Excessive bleeding (blood soaking through dressing) or unexpected drainage from the wound.  Extreme redness or swelling around the incision site, drainage of pus or foul smelling drainage.  Inability to urinate or empty your bladder within 8 hours.  Problems with  breathing or chest pain.    You should call 911 if you develop problems with breathing or chest pain.  If you are unable to contact your doctor or surgical center, you should go to the nearest emergency room or urgent care center.  Physician's telephone #: [phone]    MILD FLU-LIKE SYMPTOMS ARE NORMAL.  YOU MAY EXPERIENCE GENERALIZED MUSCLE ACHES, THROAT IRRITATION, HEADACHE AND/OR SOME NAUSEA.    If any questions arise, call your doctor.  If your doctor is not available, please feel free to call the Surgical Center at [phone].  The Center is open Monday through Friday from 7AM to 7PM.      A registered nurse may call you a few days after your surgery to see how you are doing after your procedure.    You may also receive a survey in the mail within the next two weeks and we ask that you take a few moments to complete the survey and return it to us.  Our goal is to provide you with very good care and we value your comments.     Depression / Suicide Risk    As you are discharged from this [facility] facility, it is important to learn how to keep safe from harming yourself.    Recognize the warning signs:  Abrupt changes in personality, positive or negative- including increase in energy   Giving away possessions  Change in eating patterns- significant weight changes-  positive or negative  Change in sleeping patterns- unable to sleep or sleeping all the time   Unwillingness or inability to communicate  Depression  Unusual sadness, discouragement and loneliness  Talk of wanting to die  Neglect of personal appearance   Rebelliousness- reckless behavior  Withdrawal from people/activities they love  Confusion- inability to concentrate     If you or a loved one observes any of these behaviors or has concerns about self-harm, here's what you can do:  Talk about it- your feelings and reasons for harming yourself  Remove any means that you might use to hurt yourself (examples: pills, rope, extension cords,  firearm)  Get professional help from the community (Mental Health, Substance Abuse, psychological counseling)  Do not be alone:Call your Safe Contact- someone whom you trust who will be there for you.  Call your local CRISIS HOTLINE 319-8863 or 279-528-8159  Call your local Children's Mobile Crisis Response Team [place] (330) 657-5527 or www.[employer]  Call the toll free National Suicide Prevention Hotlines   National Suicide Prevention Lifeline 222-795-OSMF (8427)  [facility] Line Network 800-SUICIDE (163-1471)    I acknowledge receipt and understanding of these Home Care instructions.

## 2024-06-20 NOTE — H&P
"Referring Provider: Maye Anderson MD     Consulting Physician: Denis Brown MD - Counts include 234 beds at the [facility]      -------------------------------------------------------------------------------------------------     Reason for consultation:  Peritoneal dialysis catheter placement.     HPI:  This is a 69 y.o. female with multiple medical problems including end-stage renal disease who has been on hemodialysis for the last 5 weeks via a right IJ permacath.  Patient is on hemodialysis Monday, Wednesday, and Friday from 3 to 6 PM.  She is referred to vascular service for peritoneal dialysis catheter placement.  Patient is status post laparoscopic hysterectomy and cholecystectomy.  She sustained hip fracture recently which was treated nonoperatively.  She also has history of hepatomegaly and nonalcoholic cirrhosis but no ascites.  She smoked half a pack a day.     Past Medical History        Past Medical History:   Diagnosis Date    Adverse effect of anesthesia       in 2008 \"throat closes up\"\"anxiety\" ?laryngospasm, kept in ICU. Pt states no problems currently 2018.     Anemia      Anesthesia       in 2008 \"throat closes up\"\"anxiety\" ?laryngospasm, kept in ICU. Pt states no problems currently 2018.     Arthritis       right shoulder, hands    Bowel habit changes More frequent    Cataract 2022    Cigarette smoker quit 2013    Dental disorder       missing teeth; Partial plate on top    depression 08/30/2016     denies depression, states has anxiety and panic attacks    Diabetes mellitus type 1 (HCC) 1989     insulin    Dialysis patient (HCC) Short time due to a kidney injury from a infection    Encounter for long-term (current) use of insulin (HCC) 09/25/2013    GI bleed      Heart burn      High cholesterol      Hypertension      Hypothyroidism, postsurgical 1970    Indigestion      Infectious disease        had hepatitis C, tested neg.    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, uncontrolled(250.63) 06/10/2015            Past Surgical History         Past Surgical History:   Procedure Laterality Date    IN ERCP,DIAGNOSTIC N/A 01/14/2022     Procedure: ERCP, DIAGNOSTIC - WITH SIGMOID COLON BIOPSY AND STENT REMOVAL;  Surgeon: Cr Haro M.D.;  Location: SURGERY [facility];  Service: Gastroenterology    THADDEUS BY LAPAROSCOPY N/A 10/28/2021     Procedure: CHOLECYSTECTOMY, LAPAROSCOPIC;  Surgeon: Tello Tramemll M.D.;  Location: [facility];  Service: General    IN ERCP,DIAGNOSTIC N/A 10/26/2021     Procedure: ERCP (ENDOSCOPIC RETROGRADE CHOLANGIOPANCREATOGRAPHY);  Surgeon: Cr Haro M.D.;  Location: SURGERY SAME DAY [facility];  Service: Gastroenterology    IN UPPER GI ENDOSCOPY,BIOPSY N/A 10/26/2021     Procedure: GASTROSCOPY, WITH BIOPSY;  Surgeon: Cr Haro M.D.;  Location: SURGERY SAME DAY [facility];  Service: Gastroenterology    IN UPPER GI ENDOSCOPY,DIAGNOSIS N/A 10/26/2021     Procedure: GASTROSCOPY;  Surgeon: Cr Haro M.D.;  Location: SURGERY SAME DAY [facility];  Service: Gastroenterology    CATH PLACEMENT   01/25/2020     Procedure: INSERTION, CATHETER PERM;  Surgeon: Rola Mendoza M.D.;  Location: [facility];  Service: General    IRRIGATION & DEBRIDEMENT NEURO   01/19/2020     Procedure: IRRIGATION AND DEBRIDEMENT, WOUND THORACIC AND LUMBAR;  Surgeon: Ryan Roman M.D.;  Location: [facility];  Service: Neurosurgery    IN INS/RPLC SPI NPG/RCVR POCKET N/A 12/16/2019     Procedure: EXPLORATION AT THORACIC 8 - 9, REPLACEMENT OF  NEUROSTIMULATOR LEAD;  Surgeon: Ryan Roman M.D.;  Location: [facility];  Service: Neurosurgery    SPINAL CORD STIMULATOR N/A 10/26/2018     Procedure: SPINAL CORD " STIMULATOR;  Surgeon: Ryan Roman M.D.;  Location: SURGERY [facility];  Service: Neurosurgery    THORACIC LAMINECTOMY N/A 10/26/2018     Procedure: THORACIC LAMINECTOMY - FOR;  Surgeon: Ryan Roman M.D.;  Location: SURGERY [facility];  Service: Neurosurgery    NH NJX AA&/STRD TFRML EPI LUMBAR/SACRAL 1 LEVEL Right 08/31/2016     Procedure: INJ-FORAMEN EPI LUM/SAC SNGL L4-5;  Surgeon: Sukhi Godfrey M.D.;  Location: SURGERY [facility];  Service: Pain Management    LUMBAR LAMINECTOMY DISKECTOMY Right 05/10/2016     Procedure: LUMBAR L4-5 HEMILAMINECTOMY DISKECTOMY ;  Surgeon: Arnold Keyes M.D.;  Location: [facility];  Service:     CERVICAL FUSION POSTERIOR   01/16/2009     Performed by TARA CONTRERAS at [facility]    HARDWARE REMOVAL NEURO   01/16/2009     Performed by TARA CONTRERAS at [facility]    NECK EXPLORATION   01/16/2009     Performed by TARA CONTRERAS at [facility]    SHOULDER ARTHROSCOPY W/ ROTATOR CUFF REPAIR   10/09/2008     Performed by SHERLY CASTANEDA at [facility]    SHOULDER DECOMPRESSION ARTHROSCOPIC   06/17/2008     Performed by SHERLY CASTANEDA at [facility]    CLAVICLE DISTAL EXCISION   06/17/2008     Performed by SHERLY CASTANEDA at [facility]    CERVICAL DISK AND FUSION ANTERIOR   03/12/2008    HYSTERECTOMY, VAGINAL   2006    APPENDECTOMY   2004    THYROID LOBECTOMY   1973    TONSILLECTOMY   1963    LUMPECTOMY   1976, 2005     Breast     LUMPECTOMY        OTHER ABDOMINAL SURGERY   2004    OTHER CARDIAC SURGERY   2020 stents inserted            Current Medications          Current Outpatient Medications   Medication Sig Dispense Refill    pramipexole (MIRAPEX) 0.125 MG Tab Take 0.125 mg by mouth every day.        Continuous Glucose Sensor (FREESTYLE PARISA 3 SENSOR) Misc 1 Each every 14 days. 90 day 6 Each 3    SYNTHROID 125 MCG Tab Take 2 Tablets by mouth every morning on an empty  stomach. 180 Tablet 2    insulin glargine (LANTUS SOLOSTAR) 100 UNIT/ML Solution Pen-injector injection Inject 35 Units under the skin every evening. 15 mL 5    torsemide (DEMADEX) 100 MG Tab Take 1 Tablet by mouth every day. 30 Tablet 3    albuterol 108 (90 Base) MCG/ACT Aero Soln inhalation aerosol Inhale 2 Puffs every 6 hours as needed for Shortness of Breath.        amLODIPine (NORVASC) 5 MG Tab Take 5 mg by mouth every day.        metoprolol SR (TOPROL XL) 50 MG TABLET SR 24 HR Take 200 mg by mouth every day. 4 tablets=200mg        Cyanocobalamin (VITAMIN B-12 PO) Take 1 Tablet by mouth every day.        umeclidinium-vilanterol (ANORO ELLIPTA) 62.5-25 MCG/ACT AEROSOL POWDER, BREATH ACTIVATED inhaler Inhale 1 Puff every day. 60 Each 0    senna-docusate (SENOKOT S) 8.6-50 MG Tab Take 1 Tablet by mouth every day.        oxycodone (OXY-IR) 15 MG immediate release tablet Take 15 mg by mouth every four hours as needed for Severe Pain.        venlafaxine (EFFEXOR-XR) 150 MG extended-release capsule Take 150 mg by mouth every day.        liothyronine (CYTOMEL) 5 MCG Tab Take 1 Tablet by mouth every day. 90 Tablet 3    famotidine (PEPCID) 20 MG Tab TAKE 1 TABLET BY MOUTH TWICE A DAY (Patient taking differently: Take 20 mg by mouth 2 times a day.) 180 Tablet 3    atorvastatin (LIPITOR) 40 MG Tab Take 1 Tablet by mouth every evening. 100 Tablet 3    fenofibrate (TRICOR) 48 MG Tab Take 48 mg by mouth every morning.        ondansetron (ZOFRAN ODT) 8 MG TABLET DISPERSIBLE Take 8 mg by mouth every 8 hours as needed for Nausea.        omeprazole (PRILOSEC) 40 MG delayed-release capsule Take 40 mg by mouth every day.        ursodiol (ACTIGALL) 300 MG Cap Take 300-600 mg by mouth 2 times a day. 1 capsule qam & 2 caps qpm        VITAMIN D, ERGOCALCIFEROL, PO Take 1 Tablet by mouth 2 times a day.        traZODone (DESYREL) 150 MG Tab Take 150 mg by mouth at bedtime.          No current facility-administered medications for this  visit.            Social History               Socioeconomic History    Marital status:        Spouse name: Not on file    Number of children: Not on file    Years of education: Not on file    Highest education level: Not on file   Occupational History    Not on file   Tobacco Use    Smoking status: Every Day       Current packs/day: 0.50       Average packs/day: 0.5 packs/day for 30.0 years (15.0 ttl pk-yrs)       Types: Cigarettes    Smokeless tobacco: Never   Vaping Use    Vaping Use: Never used   Substance and Sexual Activity    Alcohol use: Not Currently    Drug use: Not Currently       Types: Inhaled, Oral, Marijuana       Comment: Marijuana - Occasional; edibles    Sexual activity: Not Currently   Other Topics Concern    Not on file   Social History Narrative    Not on file      Social Determinants of Health           Financial Resource Strain: Low Risk  (11/1/2021)     Overall Financial Resource Strain (CARDIA)      Difficulty of Paying Living Expenses: Not very hard   Food Insecurity: No Food Insecurity (11/1/2021)     Hunger Vital Sign      Worried About Running Out of Food in the Last Year: Never true      Ran Out of Food in the Last Year: Never true   Transportation Needs: No Transportation Needs (11/1/2021)     PRAPARE - Transportation      Lack of Transportation (Medical): No      Lack of Transportation (Non-Medical): No   Physical Activity: Inactive (6/23/2020)     Exercise Vital Sign      Days of Exercise per Week: 0 days      Minutes of Exercise per Session: 0 min   Stress: No Stress Concern Present (6/23/2020)     [employer] - Occupational Stress Questionnaire      Feeling of Stress : Not at all   Social Connections: Moderately Isolated (6/23/2020)     Social Connection and Isolation Panel [NHANES]      Frequency of Communication with Friends and Family: More than three times a week      Frequency of Social Gatherings with Friends and Family: More than three  times a week      Attends Caodaism Services: Never      Active Member of Clubs or Organizations: No      Attends Club or Organization Meetings: Never      Marital Status:    Intimate Partner Violence: Not At Risk (6/23/2020)     Humiliation, Afraid, Rape, and Kick questionnaire      Fear of Current or Ex-Partner: No      Emotionally Abused: No      Physically Abused: No      Sexually Abused: No   Housing Stability: Not on file            Family History         Family History   Problem Relation Age of Onset    Hypertension Mother      Cancer Father              Allergies:  Tape     Review of Systems:     Constitutional:          Negative for fever, chills, weight loss,   HENT:                                     Negative for hearing loss or tinnitus    Eyes:                           Negative for blurred vision, double vision, or loss of vision  Respiratory:               Positive for shortness of breath on exertion Cardiac:                       Negative for chest pain or palpitations or orthopnea  Vascular:                    Negative for claudication or rest pain   Gastrointestinal:       Negative for nausea, vomiting, or abdominal pain                                      Negative for hematochezia or melena   Genitourinary:           Negative for dysuria, frequency, or hematuria   Musculoskeletal:       Positive for hip and joint pain  Skin:                           Negative for itching or rash  Neurological:             Negative for dizziness, headaches, or tremors                                      Negative for speech disturbance                                      Negative for extremity weakness or paresthesias  Endo/Heme:               Negative for easy bruising or bleeding  Psychiatric:                Negative for depression, suicidal ideas, or hallucinations     Physical Exam:  BP (!) 140/72 (BP Location: Right arm, Patient Position: Sitting, BP Cuff Size: Adult)   Pulse 70   Temp 36.8 °C  "(98.3 °F) (Temporal)   Ht 1.676 m (5' 6\")   Wt 62.6 kg (138 lb)   SpO2 95%      Constitutional:          Pleasant female in nonapparent distress, ambulating using a walker.    HEENT:                       Normocephalic and atraumatic, EOMI  Neck:                          Supple, no JVD right IJ permacath in place.  Cardiovascular:         Regular rate and rhythm  Pulmonary:                Good air entry bilaterally  Abdominal:                Soft, non-tender, non-distended                                      Aortic impulse not widened  Musculoskeletal:       No edema, no tenderness  Neurological:             CN II-XII grossly intact, no focal deficits  Skin:                           Skin is warm and dry. No rash noted.  Psychiatric:                Normal mood and affect.  Lower extremities:    Feet are warm, well-perfused.     Labs:  Reviewed.     Radiology: CT scan showed hepatomegaly but no ascites.     Assessment:  -End-stage renal disease.  -COPD.  -Dyslipidemia.  -Type 1 diabetes mellitus.  -Hypertension.    -Hypothyroidism.        Plan:  I had a long discussion with patient.  I discussed with her the option of undergoing laparoscopic placement of peritoneal dialysis catheter.  Risks and benefits were discussed.  Risks include, but not limited to, bleeding, infection, intestinal injury, catheter malplacement, catheter malfunction, and perioperative anesthetic complications.  Patient indicated understanding and would like to proceed.  All questions were answered.      I counseled patient to stop smoking.  "

## 2024-06-20 NOTE — ANESTHESIA TIME REPORT
Anesthesia Start and Stop Event Times       Date Time Event    6/20/2024 1327 Ready for Procedure     1435 Anesthesia Start     1531 Anesthesia Stop          Responsible Staff  06/20/24      Name Role Begin End    Francesca Pacheco M.D. Anesth 1435 1531          Overtime Reason:  no overtime (within assigned shift)    Comments:

## 2024-06-20 NOTE — ANESTHESIA PROCEDURE NOTES
Airway    Date/Time: 6/20/2024 2:41 PM    Performed by: Francesca Pacheco M.D.  Authorized by: Francesca Pacheco M.D.    Location:  OR  Urgency:  Elective  Indications for Airway Management:  Anesthesia      Spontaneous Ventilation: absent    Sedation Level:  Deep  Preoxygenated: Yes    Patient Position:  Sniffing  Mask Difficulty Assessment:  1 - vent by mask  Final Airway Type:  Endotracheal airway  Final Endotracheal Airway:  ETT  Cuffed: Yes    Technique Used for Successful ETT Placement:  Direct laryngoscopy    Insertion Site:  Oral  Blade Type:  Matti  Laryngoscope Blade/Videolaryngoscope Blade Size:  3  ETT Size (mm):  7.0  Measured from:  Teeth  ETT to Teeth (cm):  21  Placement Verified by: auscultation and capnometry    Cormack-Lehane Classification:  Grade IIa - partial view of glottis  Number of Attempts at Approach:  1

## 2024-06-20 NOTE — ANESTHESIA PREPROCEDURE EVALUATION
Case: [id number] Date/Time: 06/20/24 1245    Procedure: LAPAROSCOPIC INSERTION OF PERITONEAL DIALYSIS CATHETER (Abdomen)    Anesthesia type: General    Pre-op diagnosis: END STAGE RENAL DISEASE    Location: [facility] 03 / SURGERY [facility]    Surgeons: Denis Brown M.D.          - ESRD on HD on MWF via permacath. Last dialyzed yesterday 6/19.   - CAD s/p one stent 2021.   - COPD, no exacerbations, no home O2 use.   - DM 1  - HTN  - current smoker    Relevant Problems   PULMONARY   (positive) Chronic obstructive pulmonary disease      CARDIAC   (positive) Aortic atherosclerosis (HCC)   (positive) CAD S/P percutaneous coronary angioplasty   (positive) Primary hypertension      GI   (positive) GERD (gastroesophageal reflux disease)         (positive) RHEA (acute kidney injury) (HCC)   (positive) Acute renal failure superimposed on stage 4 chronic kidney disease, unspecified acute renal failure type (HCC)   (positive) CKD (chronic kidney disease) stage 4, GFR 15-29 ml/min (HCC)   (positive) ESRD needing dialysis (HCC)   (positive) Liver failure without hepatic coma (HCC)   (positive) Stage 3a chronic kidney disease   (positive) Stage 3b chronic kidney disease      ENDO   (positive) Hypothyroidism, postsurgical   (positive) Type 1 diabetes mellitus on insulin therapy (HCC)       Physical Exam    Airway   Mallampati: II  TM distance: >3 FB  Neck ROM: full       Cardiovascular - normal exam  Rhythm: regular  Rate: normal  (-) murmur     Dental - normal exam  (+) upper dentures           Pulmonary - normal exam  Breath sounds clear to auscultation     Abdominal    Neurological - normal exam         Other findings: No loose teeth               Anesthesia Plan    ASA 3   ASA physical status 3 criteria: ESRD undergoing regularly scheduled dialysis and CAD/stents (> 3 months)    Plan - general       Airway plan will be ETT          Induction: intravenous    Postoperative Plan: Postoperative administration of opioids is  intended.    Pertinent diagnostic labs and testing reviewed    Informed Consent:    Anesthetic plan and risks discussed with patient.    Use of blood products discussed with: patient whom consented to blood products.

## 2024-06-20 NOTE — ANESTHESIA POSTPROCEDURE EVALUATION
Patient: Anu Manuel    Procedure Summary       Date: 06/20/24 Room / Location: [place] [address] / SURGERY [facility]    Anesthesia Start: 1435 Anesthesia Stop: 1531    Procedure: LAPAROSCOPIC INSERTION OF PERITONEAL DIALYSIS CATHETER (Abdomen) Diagnosis: (END STAGE RENAL DISEASE)    Surgeons: Denis Brown M.D. Responsible Provider: Francesca Pacheco M.D.    Anesthesia Type: general ASA Status: 3            Final Anesthesia Type: general  Last vitals  BP   Blood Pressure : 130/60    Temp   36 °C (96.8 °F)    Pulse   71   Resp   14    SpO2   96 %      Anesthesia Post Evaluation    Patient location during evaluation: PACU  Patient participation: complete - patient participated  Level of consciousness: sleepy but conscious  Pain score: 1    Airway patency: patent  Anesthetic complications: no  Cardiovascular status: hemodynamically stable  Respiratory status: acceptable, face mask and oral airway  Hydration status: euvolemic    PONV: none          No notable events documented.     Nurse Pain Score: 0 (NPRS)

## 2024-06-20 NOTE — OP REPORT
DATE OF SERVICE:  06/20/2024     SURGEON:  Denis Brown MD     ANESTHESIOLOGIST:  Francesca Pacheco MD     TYPE OF ANESTHESIA:  General anesthesia and local anesthesia with 25 mL of   0.25% Marcaine solution.     PREOPERATIVE DIAGNOSIS:  End-stage renal disease.     POSTOPERATIVE DIAGNOSIS:  End-stage renal disease.     PROCEDURE:  Laparoscopic peritoneal dialysis catheter placement using 57 cm   pigtail peritoneal dialysis catheter.     INDICATIONS FOR PROCEDURE:  This is a pleasant 69-year-old female with   multiple medical problems including end-stage renal disease who has been on   hemodialysis via right IJ Perm-A-Cath.  The patient was referred to see me for   peritoneal dialysis catheter placement.  Discussion was made with the   patient.  She would like to proceed, fully understanding all risks.     DESCRIPTION OF PROCEDURE:  Informed consent was obtained.  The patient was   taken to the operating room and was placed in the supine position.  Sequential   compression devices were applied.  The patient was given Ancef intravenously.    General anesthesia was induced.     Next, the patient's abdomen was sterilely prepped and draped in the normal   fashion.  Timeout procedure was done.  The skin and subcutaneous tissues in   the left subcostal area were anesthetized with Marcaine solution.  A small   incision was made.  The anterior abdominal wall was elevated.  A Veress needle   was inserted.  Insufflation of the abdominal cavity was performed.  A Veress   needle was then removed.     Next, a 5 mm trocar was inserted in the abdomen.  Laparoscope was introduced   into the abdominal cavity through the trocar.  The liver was found to be   enlarged and extended to the left abdomen.  There was a tiny, superficial laceration of the left lobe of the liver.     Next, under direct visualization, another 5 mm trocar was placed on the left   lateral mid abdominal wall.  There was adhesion seen in the upper midline    abdomen.  The right side of the abdomen was free of the adhesion.  The liver laceration was cauterized using the electrocautery.  No bleeding was seen afterward.      Under ultrasound guidance, another 5 mm trocar was inserted in the mid abdomen and   tunneled inferiorly and into the peritoneal cavity under laparoscopic   guidance.  A 57 cm long peritoneal dialysis catheter was inserted into the   peritoneal cavity through the trocar.  The trocar was removed.  The distal   cuff of the catheter was positioned above the posterior rectus.  The proximal cuff was   underneath the skin.  The tip of the catheter was positioned in the pelvic   cavity.  The abdominal cavity was desufflated.  The trocars were removed.  The incisions were closed subcuticularly with 4-0 Monocryl suture.  The catheter was secured in place with 3-0 nylon suture.  The catheter was connected and easily flushed with 150 mL of   heparinized saline solution.  The wounds were cleaned and sterile dressings were   applied.     ESTIMATED BLOOD LOSS:  2 mL.     COUNTS:  Sponge and instrument count was correct x2.     The patient was then awakened, extubated, taken to recovery area in stable   condition.        ______________________________  MD LENIN Montelongo/RAGHAVENDRA    DD:  06/20/2024 15:32  DT:  06/20/2024 16:15    Job#:  [id number]

## 2024-06-20 NOTE — OR SURGEON
Immediate Post OP Note    PreOp Diagnosis: End-stage renal disease.      PostOp Diagnosis: Same.      Procedure(s):  LAPAROSCOPIC INSERTION OF PERITONEAL DIALYSIS CATHETER - Wound Class: Clean    Surgeon(s):  Denis Brown M.D.    Anesthesiologist/Type of Anesthesia:  Anesthesiologist: Francesca Pacheco M.D./General    Surgical Staff:  Circulator: Tello Pratt R.N.  Scrub Person: Elida Mab    Specimens removed if any:  * No specimens in log *    Estimated Blood Loss: 2 mL.    Findings: Catheter tip in pelvic.  Easily flushed.    Complications: None.      Dictated, #[id number].          6/20/2024 3:27 PM Denis Brown M.D.

## 2024-06-20 NOTE — PROGRESS NOTES
Pharmacy Medication Reconciliation      ~Medication reconciliation updated and complete per patient at bedside   ~Allergies have been verified and updated   ~No oral ABX within the last 30 days  ~Patient home pharmacy :  [facility]    ~Patient reports that she only took 13 units of Lantus last night 6/19/2024      ~Anticoagulants (rivaroxaban, apixaban, edoxaban, dabigatran, warfarin, enoxaparin) taken in the last 14 days? No

## 2024-06-21 NOTE — OR NURSING
Anesthesiologist fabián Pacheco notified regarding tylenol refusal.   
Assumed care in pre- op .  Patient is alert and oriented x 4   Morgan at bedside.   Denies any pain . Pain goal   V/s taken and recorded.   Triple aim done .   Surgical procedure verified and signed by the patient   IV started at left hand g20   Labs sent for cbc , bmp , PT.   Nacl connected and regulated.   Bed in the lowest position , call bell within reach.  Waiting for md's .   Belongings at locker   
Patient discharge home .  
Patient's brother called and provided with update.   
Report to Diana MORSE. Plan of care discussed. Patient reports tolerable 5/10 abdominal discomfort. No complaints of nausea. Surgical site CDI with ice in place. Patient expresses desire to proceed to discharge.  
None

## 2024-07-10 ENCOUNTER — HOSPITAL ENCOUNTER (OUTPATIENT)
Dept: LAB | Facility: MEDICAL CENTER | Age: 69
End: 2024-07-10
Attending: INTERNAL MEDICINE
Payer: MEDICARE

## 2024-07-10 DIAGNOSIS — E78.5 DYSLIPIDEMIA: ICD-10-CM

## 2024-07-10 DIAGNOSIS — E10.9 TYPE 1 DIABETES MELLITUS ON INSULIN THERAPY (HCC): ICD-10-CM

## 2024-07-10 DIAGNOSIS — Z79.4 LONG-TERM INSULIN USE (HCC): ICD-10-CM

## 2024-07-10 DIAGNOSIS — E55.9 VITAMIN D DEFICIENCY: ICD-10-CM

## 2024-07-10 DIAGNOSIS — E89.0 HYPOTHYROIDISM, POSTSURGICAL: ICD-10-CM

## 2024-07-10 DIAGNOSIS — M80.00XD AGE-RELATED OSTEOPOROSIS WITH CURRENT PATHOLOGICAL FRACTURE WITH ROUTINE HEALING: ICD-10-CM

## 2024-07-10 LAB
25(OH)D3 SERPL-MCNC: 43 NG/ML (ref 30–100)
ALBUMIN SERPL BCP-MCNC: 3.5 G/DL (ref 3.2–4.9)
ALBUMIN/GLOB SERPL: 0.8 G/DL
ALP SERPL-CCNC: 1145 U/L (ref 30–99)
ALT SERPL-CCNC: 65 U/L (ref 2–50)
ANION GAP SERPL CALC-SCNC: 15 MMOL/L (ref 7–16)
AST SERPL-CCNC: 72 U/L (ref 12–45)
BILIRUB SERPL-MCNC: 0.7 MG/DL (ref 0.1–1.5)
BUN SERPL-MCNC: 26 MG/DL (ref 8–22)
CALCIUM ALBUM COR SERPL-MCNC: 9.5 MG/DL (ref 8.5–10.5)
CALCIUM SERPL-MCNC: 9.1 MG/DL (ref 8.5–10.5)
CHLORIDE SERPL-SCNC: 96 MMOL/L (ref 96–112)
CO2 SERPL-SCNC: 22 MMOL/L (ref 20–33)
CREAT SERPL-MCNC: 3.25 MG/DL (ref 0.5–1.4)
GFR SERPLBLD CREATININE-BSD FMLA CKD-EPI: 15 ML/MIN/1.73 M 2
GLOBULIN SER CALC-MCNC: 4.2 G/DL (ref 1.9–3.5)
GLUCOSE SERPL-MCNC: 370 MG/DL (ref 65–99)
POTASSIUM SERPL-SCNC: 4.8 MMOL/L (ref 3.6–5.5)
PROT SERPL-MCNC: 7.7 G/DL (ref 6–8.2)
SODIUM SERPL-SCNC: 133 MMOL/L (ref 135–145)
T4 FREE SERPL-MCNC: 1.03 NG/DL (ref 0.93–1.7)
TSH SERPL DL<=0.005 MIU/L-ACNC: 49.3 UIU/ML (ref 0.38–5.33)

## 2024-07-10 PROCEDURE — 36415 COLL VENOUS BLD VENIPUNCTURE: CPT

## 2024-07-10 PROCEDURE — 84443 ASSAY THYROID STIM HORMONE: CPT

## 2024-07-10 PROCEDURE — 82570 ASSAY OF URINE CREATININE: CPT

## 2024-07-10 PROCEDURE — 80053 COMPREHEN METABOLIC PANEL: CPT

## 2024-07-10 PROCEDURE — 84439 ASSAY OF FREE THYROXINE: CPT

## 2024-07-10 PROCEDURE — 82043 UR ALBUMIN QUANTITATIVE: CPT

## 2024-07-10 PROCEDURE — 82306 VITAMIN D 25 HYDROXY: CPT

## 2024-07-11 LAB
CREAT UR-MCNC: 223.23 MG/DL
MICROALBUMIN UR-MCNC: >440 MG/DL
MICROALBUMIN/CREAT UR: NORMAL MG/G (ref 0–30)

## 2024-07-15 ENCOUNTER — NON-PROVIDER VISIT (OUTPATIENT)
Dept: ENDOCRINOLOGY | Facility: MEDICAL CENTER | Age: 69
End: 2024-07-15
Attending: INTERNAL MEDICINE
Payer: MEDICARE

## 2024-07-15 VITALS
DIASTOLIC BLOOD PRESSURE: 60 MMHG | SYSTOLIC BLOOD PRESSURE: 112 MMHG | WEIGHT: 129 LBS | HEIGHT: 66 IN | OXYGEN SATURATION: 98 % | HEART RATE: 80 BPM | BODY MASS INDEX: 20.73 KG/M2

## 2024-07-15 DIAGNOSIS — E78.5 DYSLIPIDEMIA: ICD-10-CM

## 2024-07-15 DIAGNOSIS — E10.9 TYPE 1 DIABETES MELLITUS ON INSULIN THERAPY (HCC): ICD-10-CM

## 2024-07-15 DIAGNOSIS — E89.0 HYPOTHYROIDISM, POSTSURGICAL: ICD-10-CM

## 2024-07-15 PROCEDURE — 99212 OFFICE O/P EST SF 10 MIN: CPT | Performed by: INTERNAL MEDICINE

## 2024-07-15 RX ORDER — INSULIN GLARGINE 300 U/ML
45 INJECTION, SOLUTION SUBCUTANEOUS
Qty: 27 ML | Refills: 3 | Status: SHIPPED | OUTPATIENT
Start: 2024-07-15 | End: 2024-07-15

## 2024-07-15 RX ORDER — INSULIN GLARGINE 300 U/ML
45 INJECTION, SOLUTION SUBCUTANEOUS
Qty: 13.5 ML | Refills: 3 | Status: SHIPPED | OUTPATIENT
Start: 2024-07-15 | End: 2024-07-15

## 2024-07-15 RX ORDER — INSULIN GLARGINE 300 U/ML
45 INJECTION, SOLUTION SUBCUTANEOUS DAILY
Qty: 13.5 ML | Refills: 3 | Status: SHIPPED | OUTPATIENT
Start: 2024-07-15

## 2024-07-15 RX ORDER — INSULIN ASPART INJECTION 100 [IU]/ML
10 INJECTION, SOLUTION SUBCUTANEOUS
COMMUNITY
End: 2024-07-15 | Stop reason: CLARIF

## 2024-07-15 RX ORDER — INSULIN LISPRO-AABC 100 [IU]/ML
10 INJECTION, SOLUTION SUBCUTANEOUS
Qty: 30 ML | Refills: 3 | Status: SHIPPED | OUTPATIENT
Start: 2024-07-15

## 2024-07-15 ASSESSMENT — FIBROSIS 4 INDEX: FIB4 SCORE: 3.1

## 2024-07-23 ENCOUNTER — HOSPITAL ENCOUNTER (OUTPATIENT)
Dept: LAB | Facility: MEDICAL CENTER | Age: 69
End: 2024-07-23
Attending: INTERNAL MEDICINE
Payer: MEDICARE

## 2024-07-23 ENCOUNTER — OFFICE VISIT (OUTPATIENT)
Dept: VASCULAR SURGERY | Facility: MEDICAL CENTER | Age: 69
End: 2024-07-23
Payer: MEDICARE

## 2024-07-23 ENCOUNTER — HOSPITAL ENCOUNTER (OUTPATIENT)
Dept: LAB | Facility: MEDICAL CENTER | Age: 69
End: 2024-07-23
Payer: MEDICARE

## 2024-07-23 VITALS
HEART RATE: 67 BPM | OXYGEN SATURATION: 97 % | SYSTOLIC BLOOD PRESSURE: 108 MMHG | BODY MASS INDEX: 19.15 KG/M2 | TEMPERATURE: 97 F | WEIGHT: 122 LBS | DIASTOLIC BLOOD PRESSURE: 66 MMHG | HEIGHT: 67 IN

## 2024-07-23 DIAGNOSIS — E89.0 HYPOTHYROIDISM, POSTSURGICAL: ICD-10-CM

## 2024-07-23 DIAGNOSIS — E10.9 TYPE 1 DIABETES MELLITUS ON INSULIN THERAPY (HCC): ICD-10-CM

## 2024-07-23 DIAGNOSIS — E78.5 DYSLIPIDEMIA: ICD-10-CM

## 2024-07-23 DIAGNOSIS — Z99.2 END STAGE RENAL DISEASE ON DIALYSIS (HCC): ICD-10-CM

## 2024-07-23 DIAGNOSIS — N18.6 END STAGE RENAL DISEASE ON DIALYSIS (HCC): ICD-10-CM

## 2024-07-23 LAB
25(OH)D3 SERPL-MCNC: 46 NG/ML (ref 30–100)
ALBUMIN SERPL BCP-MCNC: 3.4 G/DL (ref 3.2–4.9)
ALBUMIN/GLOB SERPL: 0.8 G/DL
ALP SERPL-CCNC: 1069 U/L (ref 30–99)
ALT SERPL-CCNC: 67 U/L (ref 2–50)
ANION GAP SERPL CALC-SCNC: 14 MMOL/L (ref 7–16)
AST SERPL-CCNC: 98 U/L (ref 12–45)
BASOPHILS # BLD AUTO: 1.9 % (ref 0–1.8)
BASOPHILS # BLD: 0.14 K/UL (ref 0–0.12)
BILIRUB SERPL-MCNC: 1 MG/DL (ref 0.1–1.5)
BUN SERPL-MCNC: 19 MG/DL (ref 8–22)
CALCIUM ALBUM COR SERPL-MCNC: 10.2 MG/DL (ref 8.5–10.5)
CALCIUM SERPL-MCNC: 9.7 MG/DL (ref 8.5–10.5)
CHLORIDE SERPL-SCNC: 98 MMOL/L (ref 96–112)
CHOLEST SERPL-MCNC: 246 MG/DL (ref 100–199)
CO2 SERPL-SCNC: 23 MMOL/L (ref 20–33)
CREAT SERPL-MCNC: 2.91 MG/DL (ref 0.5–1.4)
EOSINOPHIL # BLD AUTO: 0.24 K/UL (ref 0–0.51)
EOSINOPHIL NFR BLD: 3.3 % (ref 0–6.9)
ERYTHROCYTE [DISTWIDTH] IN BLOOD BY AUTOMATED COUNT: 55.7 FL (ref 35.9–50)
FASTING STATUS PATIENT QL REPORTED: NORMAL
GFR SERPLBLD CREATININE-BSD FMLA CKD-EPI: 17 ML/MIN/1.73 M 2
GLOBULIN SER CALC-MCNC: 4.5 G/DL (ref 1.9–3.5)
GLUCOSE SERPL-MCNC: 164 MG/DL (ref 65–99)
HCT VFR BLD AUTO: 40.5 % (ref 37–47)
HDLC SERPL-MCNC: 132 MG/DL
HGB BLD-MCNC: 13.2 G/DL (ref 12–16)
IMM GRANULOCYTES # BLD AUTO: 0.02 K/UL (ref 0–0.11)
IMM GRANULOCYTES NFR BLD AUTO: 0.3 % (ref 0–0.9)
INR PPP: 0.85 (ref 0.87–1.13)
LDLC SERPL CALC-MCNC: 100 MG/DL
LYMPHOCYTES # BLD AUTO: 0.87 K/UL (ref 1–4.8)
LYMPHOCYTES NFR BLD: 12.1 % (ref 22–41)
MCH RBC QN AUTO: 30.3 PG (ref 27–33)
MCHC RBC AUTO-ENTMCNC: 32.6 G/DL (ref 32.2–35.5)
MCV RBC AUTO: 92.9 FL (ref 81.4–97.8)
MONOCYTES # BLD AUTO: 0.8 K/UL (ref 0–0.85)
MONOCYTES NFR BLD AUTO: 11.1 % (ref 0–13.4)
NEUTROPHILS # BLD AUTO: 5.13 K/UL (ref 1.82–7.42)
NEUTROPHILS NFR BLD: 71.3 % (ref 44–72)
NRBC # BLD AUTO: 0 K/UL
NRBC BLD-RTO: 0 /100 WBC (ref 0–0.2)
PLATELET # BLD AUTO: 216 K/UL (ref 164–446)
PMV BLD AUTO: 10.7 FL (ref 9–12.9)
POTASSIUM SERPL-SCNC: 4 MMOL/L (ref 3.6–5.5)
PROT SERPL-MCNC: 7.9 G/DL (ref 6–8.2)
PROTHROMBIN TIME: 11.8 SEC (ref 12–14.6)
RBC # BLD AUTO: 4.36 M/UL (ref 4.2–5.4)
SODIUM SERPL-SCNC: 135 MMOL/L (ref 135–145)
T3FREE SERPL-MCNC: 1.87 PG/ML (ref 2–4.4)
T4 FREE SERPL-MCNC: 1.12 NG/DL (ref 0.93–1.7)
TRIGL SERPL-MCNC: 70 MG/DL (ref 0–149)
TSH SERPL-ACNC: 24.1 UIU/ML (ref 0.35–5.5)
TSH SERPL-ACNC: 24.2 UIU/ML (ref 0.35–5.5)
WBC # BLD AUTO: 7.2 K/UL (ref 4.8–10.8)

## 2024-07-23 PROCEDURE — 85025 COMPLETE CBC W/AUTO DIFF WBC: CPT

## 2024-07-23 PROCEDURE — 84481 FREE ASSAY (FT-3): CPT

## 2024-07-23 PROCEDURE — 84443 ASSAY THYROID STIM HORMONE: CPT | Mod: 91

## 2024-07-23 PROCEDURE — 3078F DIAST BP <80 MM HG: CPT | Performed by: PHYSICIAN ASSISTANT

## 2024-07-23 PROCEDURE — 99024 POSTOP FOLLOW-UP VISIT: CPT | Performed by: PHYSICIAN ASSISTANT

## 2024-07-23 PROCEDURE — 3074F SYST BP LT 130 MM HG: CPT | Performed by: PHYSICIAN ASSISTANT

## 2024-07-23 PROCEDURE — 36415 COLL VENOUS BLD VENIPUNCTURE: CPT

## 2024-07-23 PROCEDURE — 84590 ASSAY OF VITAMIN A: CPT

## 2024-07-23 PROCEDURE — 84443 ASSAY THYROID STIM HORMONE: CPT

## 2024-07-23 PROCEDURE — 80061 LIPID PANEL: CPT

## 2024-07-23 PROCEDURE — 80053 COMPREHEN METABOLIC PANEL: CPT

## 2024-07-23 PROCEDURE — 85610 PROTHROMBIN TIME: CPT

## 2024-07-23 PROCEDURE — 84439 ASSAY OF FREE THYROXINE: CPT

## 2024-07-23 PROCEDURE — 82306 VITAMIN D 25 HYDROXY: CPT

## 2024-07-23 ASSESSMENT — FIBROSIS 4 INDEX: FIB4 SCORE: 3.71

## 2024-07-25 LAB
ANNOTATION COMMENT IMP: NORMAL
RETINYL PALMITATE SERPL-MCNC: <0.02 MG/L (ref 0–0.1)
VIT A SERPL-MCNC: 0.33 MG/L (ref 0.3–1.2)

## 2024-08-06 ENCOUNTER — OFFICE VISIT (OUTPATIENT)
Dept: VASCULAR SURGERY | Facility: MEDICAL CENTER | Age: 69
End: 2024-08-06
Payer: MEDICARE

## 2024-08-06 VITALS
DIASTOLIC BLOOD PRESSURE: 66 MMHG | TEMPERATURE: 97.6 F | OXYGEN SATURATION: 99 % | BODY MASS INDEX: 19.15 KG/M2 | WEIGHT: 122 LBS | HEART RATE: 65 BPM | HEIGHT: 67 IN | SYSTOLIC BLOOD PRESSURE: 126 MMHG

## 2024-08-06 DIAGNOSIS — N18.6 ESRD (END STAGE RENAL DISEASE) (HCC): ICD-10-CM

## 2024-08-06 ASSESSMENT — FIBROSIS 4 INDEX: FIB4 SCORE: 3.71

## 2024-08-06 NOTE — PROGRESS NOTES
"                 VASCULAR SURGERY                    Postop Note  _________________________________    8/6/2024    This patient is here to follow-up after laparoscopic assisted peritoneal dialysis catheter placement by Dr. Brown.  The patient returns to have her suture removed from the catheter site.  That suture was removed without difficulty.    Patient reports that the catheter is working properly      Vitals  /66 (BP Location: Left arm, Patient Position: Sitting, BP Cuff Size: Adult)   Pulse 65   Temp 36.4 °C (97.6 °F) (Temporal)   Ht 1.689 m (5' 6.5\")   Wt 55.3 kg (122 lb)   LMP 04/29/2005 (LMP Unknown)   SpO2 99%   BMI 19.40 kg/m²     Exam  All incisions are healed and catheter site looks good            DiagnosisAssessment:    End-stage renal disease status post laparoscopic assisted peritoneal dialysis catheter placement    Plan:      Sutures removed today    The patient demonstrates a clear understanding of our discussion and agrees.     Follow up:    As needed      Minesh Palmer M.D.  [facility]  [phone]  [address], [place] [address]  "

## 2024-08-08 ENCOUNTER — TELEPHONE (OUTPATIENT)
Dept: ENDOCRINOLOGY | Facility: MEDICAL CENTER | Age: 69
End: 2024-08-08
Payer: MEDICARE

## 2024-08-08 NOTE — TELEPHONE ENCOUNTER
Left a detailed message informing patient that his appointment was changed to October 24 @ 2:00 pm renan Paz at a training on his original appoitment day

## 2024-09-05 PROBLEM — N18.4 ACUTE RENAL FAILURE SUPERIMPOSED ON STAGE 4 CHRONIC KIDNEY DISEASE, UNSPECIFIED ACUTE RENAL FAILURE TYPE (HCC): Status: RESOLVED | Noted: 2024-02-28 | Resolved: 2024-09-05

## 2024-09-05 PROBLEM — N17.9 ACUTE RENAL FAILURE SUPERIMPOSED ON STAGE 4 CHRONIC KIDNEY DISEASE, UNSPECIFIED ACUTE RENAL FAILURE TYPE (HCC): Status: RESOLVED | Noted: 2024-02-28 | Resolved: 2024-09-05

## 2024-09-10 ENCOUNTER — OFFICE VISIT (OUTPATIENT)
Dept: VASCULAR SURGERY | Facility: MEDICAL CENTER | Age: 69
End: 2024-09-10
Payer: MEDICARE

## 2024-09-10 ENCOUNTER — TELEPHONE (OUTPATIENT)
Dept: ENDOCRINOLOGY | Facility: MEDICAL CENTER | Age: 69
End: 2024-09-10

## 2024-09-10 VITALS
OXYGEN SATURATION: 99 % | HEART RATE: 73 BPM | HEIGHT: 67 IN | TEMPERATURE: 97.7 F | BODY MASS INDEX: 19.84 KG/M2 | DIASTOLIC BLOOD PRESSURE: 72 MMHG | WEIGHT: 126.4 LBS | SYSTOLIC BLOOD PRESSURE: 122 MMHG

## 2024-09-10 DIAGNOSIS — N18.6 ESRD (END STAGE RENAL DISEASE) (HCC): ICD-10-CM

## 2024-09-10 DIAGNOSIS — E10.29 TYPE 1 DIABETES MELLITUS WITH OTHER KIDNEY COMPLICATION (HCC): ICD-10-CM

## 2024-09-10 PROCEDURE — 3074F SYST BP LT 130 MM HG: CPT | Performed by: SURGERY

## 2024-09-10 PROCEDURE — 99214 OFFICE O/P EST MOD 30 MIN: CPT | Performed by: SURGERY

## 2024-09-10 PROCEDURE — 3078F DIAST BP <80 MM HG: CPT | Performed by: SURGERY

## 2024-09-10 ASSESSMENT — FIBROSIS 4 INDEX: FIB4 SCORE: 3.71

## 2024-09-10 NOTE — TELEPHONE ENCOUNTER
Received request via: Pharmacy    Was the patient seen in the last year in this department? Yes    Does the patient have an active prescription (recently filled or refills available) for medication(s) requested? No    [facility]

## 2024-09-10 NOTE — PROGRESS NOTES
"  VASCULAR SURGERY SERVICE  OFFICE FOLLOW UP      Date: 9/10/2024    Referring Provider: Maye Anderson MD    Consulting Physician: Denis Brown MD - [facility]     -------------------------------------------------------------------------------------------------    Chief complaint:  I do not want peritoneal dialysis.    HPI:  This is a 69 y.o. right-handed female with multiple medical problem including end-stage renal disease who previously had a peritoneal dialysis catheter placed laparoscopically.  Patient was using the peritoneal dialysis catheter without any problem; however, she decided that it was \"too much for me\" and decided to go with hemodialysis instead.  Patient has been dialyzed via a right IJ permacath.  She is on hemodialysis Monday, Wednesday, Friday from 6 AM to 9 AM.  Patient is referred back to see me for peritoneal dialysis catheter removal and hemodialysis access creation.    Past Medical History:   Diagnosis Date    Accidental drug overdose 08/27/2012    Adverse effect of anesthesia     in 2008 \"throat closes up\"\"anxiety\" ?laryngospasm, kept in ICU. Pt states no problems currently 2018.     Anemia     Anesthesia     in 2008 \"throat closes up\"\"anxiety\" ?laryngospasm, kept in ICU. Pt states no problems currently 2018.     Arthritis     right shoulder, hands    Bowel habit changes More frequent    Broken hip ([facility]) 04/2024    Broken hip possibly the pelvis.  Inoperable    Cataract 2022    Cigarette smoker quit 2013    Dental disorder     missing teeth; Partial plate on top    depression 08/30/2016    denies depression, states has anxiety and panic attacks    Diabetes mellitus type 1 ([facility]) 1989    insulin    Dialysis patient ([facility]) Short time due to a kidney injury from a infection    Encounter for long-term (current) use of insulin ([facility]) 09/25/2013    GI bleed     Heart burn     High cholesterol     Hypertension     Hypothyroidism, postsurgical 1970    Indigestion     Infectious disease     " " had hepatitis C, tested neg.    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) 06/10/2015       Past Surgical History:   Procedure Laterality Date    PB LAP INSERT INTRAPERITONEAL CATHETER  6/20/2024    Procedure: LAPAROSCOPIC INSERTION OF PERITONEAL DIALYSIS CATHETER;  Surgeon: Denis Brown M.D.;  Location: [facility];  Service: General    MD ERCP,DIAGNOSTIC N/A 01/14/2022    Procedure: ERCP, DIAGNOSTIC - WITH SIGMOID COLON BIOPSY AND STENT REMOVAL;  Surgeon: Cr Haro M.D.;  Location: [facility];  Service: Gastroenterology    THADDEUS BY LAPAROSCOPY N/A 10/28/2021    Procedure: CHOLECYSTECTOMY, LAPAROSCOPIC;  Surgeon: Tello Trammell M.D.;  Location: [facility];  Service: General    MD ERCP,DIAGNOSTIC N/A 10/26/2021    Procedure: ERCP (ENDOSCOPIC RETROGRADE CHOLANGIOPANCREATOGRAPHY);  Surgeon: Cr Haro M.D.;  Location: SURGERY SAME DAY [facility];  Service: Gastroenterology    MD UPPER GI ENDOSCOPY,BIOPSY N/A 10/26/2021    Procedure: GASTROSCOPY, WITH BIOPSY;  Surgeon: Cr Haro M.D.;  Location: SURGERY SAME DAY [facility];  Service: Gastroenterology    MD UPPER GI ENDOSCOPY,DIAGNOSIS N/A 10/26/2021    Procedure: GASTROSCOPY;  Surgeon: Cr Haro M.D.;  Location: SURGERY SAME DAY [facility];  Service: Gastroenterology    CATH PLACEMENT  01/25/2020    Procedure: INSERTION, CATHETER PERM;  Surgeon: Rola Mendoza M.D.;  Location: SURGERY [facility];  Service: General    IRRIGATION & DEBRIDEMENT NEURO  01/19/2020    Procedure: IRRIGATION AND DEBRIDEMENT, WOUND THORACIC AND LUMBAR;  Surgeon: Ryan Roman M.D.;  Location: SURGERY [facility];  Service: Neurosurgery    MD INS/RPLC SPI " NPG/RCVR POCKET N/A 12/16/2019    Procedure: EXPLORATION AT THORACIC 8 - 9, REPLACEMENT OF  NEUROSTIMULATOR LEAD;  Surgeon: Ryan Roman M.D.;  Location: SURGERY [facility];  Service: Neurosurgery    SPINAL CORD STIMULATOR N/A 10/26/2018    Procedure: SPINAL CORD STIMULATOR;  Surgeon: Ryan Roman M.D.;  Location: SURGERY [facility];  Service: Neurosurgery    THORACIC LAMINECTOMY N/A 10/26/2018    Procedure: THORACIC LAMINECTOMY - FOR;  Surgeon: Ryan Roman M.D.;  Location: [facility];  Service: Neurosurgery    SC NJX AA&/STRD TFRML EPI LUMBAR/SACRAL 1 LEVEL Right 08/31/2016    Procedure: INJ-FORAMEN EPI LUM/SAC SNGL L4-5;  Surgeon: Sukhi Godfrey M.D.;  Location: [facility];  Service: Pain Management    LUMBAR LAMINECTOMY DISKECTOMY Right 05/10/2016    Procedure: LUMBAR L4-5 HEMILAMINECTOMY DISKECTOMY ;  Surgeon: Arnold Keyes M.D.;  Location: [facility];  Service:     CERVICAL FUSION POSTERIOR  01/16/2009    Performed by TARA CONTRERAS at [facility]    HARDWARE REMOVAL NEURO  01/16/2009    Performed by TARA CONTRERAS at [facility]    NECK EXPLORATION  01/16/2009    Performed by TARA CONTRERAS at [facility]    SHOULDER ARTHROSCOPY W/ ROTATOR CUFF REPAIR  10/09/2008    Performed by SHERLY CASTANEDA at [facility]    SHOULDER DECOMPRESSION ARTHROSCOPIC  06/17/2008    Performed by SHERLY CASTANEDA at [facility] ORS    CLAVICLE DISTAL EXCISION  06/17/2008    Performed by SHERLY CASTANEDA at [facility]    CERVICAL DISK AND FUSION ANTERIOR  03/12/2008    HYSTERECTOMY, VAGINAL  2006    APPENDECTOMY  2004    THYROID LOBECTOMY  1973    TONSILLECTOMY  1963    LUMPECTOMY  1976, 2005    Breast     LUMPECTOMY      OTHER ABDOMINAL SURGERY  2004    OTHER CARDIAC SURGERY  2020 stents inserted       Current Outpatient Medications   Medication Sig Dispense Refill    Insulin Lispro-aabc, 1 U Dial,  (LYUMJEV KWIKPEN) 100 UNIT/ML Solution Pen-injector Inject 10 Units under the skin 3 times a day before meals. 30 mL 3    insulin NPH, Human,, Isophane, (HUMULIN KWIKPEN) 100 UNIT/ML injection PEN Inject 30 Units under the skin every day. 30 mL 3    Insulin Glargine, 1 Unit Dial, (TOUJEO SOLOSTAR) 300 UNIT/ML Solution Pen-injector Inject 45 Units under the skin every day. 13.5 mL 3    metoprolol SR (TOPROL XL) 100 MG TABLET SR 24 HR Take 100 mg by mouth every day. 100 mg tablet + 50 mg tablet for a total dose of 150 mg daily      pramipexole (MIRAPEX) 0.125 MG Tab Take 0.25 mg by mouth every evening.         Continuous Glucose Sensor (FREESTYLE PARISA 3 SENSOR) Misc 1 Each every 14 days. 90 day 6 Each 3    SYNTHROID 125 MCG Tab Take 2 Tablets by mouth every morning on an empty stomach. 180 Tablet 2    albuterol 108 (90 Base) MCG/ACT Aero Soln inhalation aerosol Inhale 2 Puffs every 6 hours as needed for Shortness of Breath.         amLODIPine (NORVASC) 5 MG Tab Take 10 mg by mouth every day.         metoprolol SR (TOPROL XL) 50 MG TABLET SR 24 HR Take 50 mg by mouth every day. 50 mg tablet + 100 mg tablet for a total dose of 150 mg daily      Cyanocobalamin (VITAMIN B-12 PO) Take 1 Tablet by mouth every day.         senna-docusate (SENOKOT S) 8.6-50 MG Tab Take 1 Tablet by mouth every day.         oxycodone (OXY-IR) 15 MG immediate release tablet Take 15 mg by mouth every four hours as needed for Severe Pain.         venlafaxine (EFFEXOR-XR) 150 MG extended-release capsule Take 150 mg by mouth every day.         liothyronine (CYTOMEL) 5 MCG Tab Take 1 Tablet by mouth every day. 90 Tablet 3    famotidine (PEPCID) 20 MG Tab TAKE 1 TABLET BY MOUTH TWICE A  Tablet 3    atorvastatin (LIPITOR) 40 MG Tab Take 1 Tablet by mouth every evening. 100 Tablet 3    ondansetron (ZOFRAN ODT) 8 MG TABLET DISPERSIBLE Take 8 mg by mouth every 8 hours as needed for Nausea.     OK TO CONTINUE AND TAKE THE MORNING OF SURGERY IF  NEEDED.  Surgery date 6/20/2024.      omeprazole (PRILOSEC) 40 MG delayed-release capsule Take 40 mg by mouth every day.         VITAMIN D, ERGOCALCIFEROL, PO Take 1 Tablet by mouth 2 times a day.         traZODone (DESYREL) 150 MG Tab Take 150 mg by mouth at bedtime.         Blood Glucose Monitoring Suppl (FREESTYLE LITE) Device 1 Each every 21 days.       No current facility-administered medications for this visit.       Social History     Socioeconomic History    Marital status:      Spouse name: Not on file    Number of children: Not on file    Years of education: Not on file    Highest education level: Not on file   Occupational History    Not on file   Tobacco Use    Smoking status: Every Day     Current packs/day: 0.50     Average packs/day: 0.5 packs/day for 30.0 years (15.0 ttl pk-yrs)     Types: Cigarettes    Smokeless tobacco: Never    Tobacco comments:     Currently smokes 1/2 - 3/4 a pack daily.  Has smoked for about 50 years.   Vaping Use    Vaping status: Never Used   Substance and Sexual Activity    Alcohol use: Not Currently    Drug use: Not Currently     Types: Inhaled, Oral, Marijuana     Comment: Marijuana - Occasional; edibles    Sexual activity: Not Currently   Other Topics Concern    Not on file   Social History Narrative    Not on file     Social Determinants of Health     Financial Resource Strain: Low Risk  (11/1/2021)    Overall Financial Resource Strain (CARDIA)     Difficulty of Paying Living Expenses: Not very hard   Food Insecurity: No Food Insecurity (11/1/2021)    Hunger Vital Sign     Worried About Running Out of Food in the Last Year: Never true     Ran Out of Food in the Last Year: Never true   Transportation Needs: No Transportation Needs (11/1/2021)    PRAPARE - Transportation     Lack of Transportation (Medical): No     Lack of Transportation (Non-Medical): No   Physical Activity: Inactive (6/23/2020)    Exercise Vital Sign     Days of Exercise per Week: 0 days      Minutes of Exercise per Session: 0 min   Stress: No Stress Concern Present (6/23/2020)    [employer] [place] - Occupational Stress Questionnaire     Feeling of Stress : Not at all   Social Connections: Moderately Isolated (6/23/2020)    Social Connection and Isolation Panel [NHANES]     Frequency of Communication with Friends and Family: More than three times a week     Frequency of Social Gatherings with Friends and Family: More than three times a week     Attends Evangelical Services: Never     Active Member of Clubs or Organizations: No     Attends Club or Organization Meetings: Never     Marital Status:    Intimate Partner Violence: Not At Risk (6/23/2020)    Humiliation, Afraid, Rape, and Kick questionnaire     Fear of Current or Ex-Partner: No     Emotionally Abused: No     Physically Abused: No     Sexually Abused: No   Housing Stability: Not on file       Family History   Problem Relation Age of Onset    Hypertension Mother     Cancer Father        Allergies:  Tape    Review of Systems:    Constitutional: Negative for fever, chills, weight loss,   HENT:    Negative for hearing loss or tinnitus    Eyes:    Negative for blurred vision, double vision, or loss of vision  Respiratory:  Negative for cough, hemoptysis, or wheezing    Cardiac:  Negative for chest pain or palpitations or orthopnea  Vascular:  Negative for claudication or rest pain   Gastrointestinal: Negative for nausea, vomiting, or abdominal pain     Negative for hematochezia or melena   Genitourinary: Negative for dysuria, frequency, or hematuria   Musculoskeletal: Negative for myalgias, back pain, or joint pain  Skin:   Negative for itching or rash  Neurological:  Negative for dizziness, headaches, or tremors     Negative for speech disturbance     Negative for extremity weakness or paresthesias  Endo/Heme:  Positive for easy bruising or bleeding  Psychiatric:  Negative for depression, suicidal ideas, or  "hallucinations    Physical Exam:  /72 (BP Location: Left arm, Patient Position: Sitting, BP Cuff Size: Adult)   Pulse 73   Temp 36.5 °C (97.7 °F) (Temporal)   Ht 1.689 m (5' 6.5\")   Wt 57.3 kg (126 lb 6.4 oz)   SpO2 99%     Constitutional: Alert, oriented, no acute distress  HEENT:  Normocephalic and atraumatic, EOMI  Neck:   Supple, no JVD, right IJ permacath in place.  Cardiovascular: Regular rate and rhythm  Pulmonary:  Good air entry bilaterally  Abdominal:  Soft, non-tender, non-distended     PD cath in place, site clean.    Musculoskeletal: No edema, no tenderness  Neurological:  CN II-XII grossly intact, no focal deficits  Skin:   Skin is warm and dry. No rash noted.  Psychiatric:  Normal mood and affect.  Upper extremities: Palpable bilateral radial and brachial pulses with multiphasic flow.  No obvious superficial veins.    Labs:  Reviewed.    Radiology:  Noninvasive fistula creation duplex performed at outside facility showed bilateral cephalic and basilic veins to be small, not suitable for use in fistula creation.    Assessment:  -End-stage renal disease, on hemodialysis.  -Unused peritoneal dialysis catheter.  -Type 1 diabetes mellitus.  -Chronic liver disease.  -COPD.    Plan:  I had a long discussion with patient.  Study results were reviewed with her.  Unfortunately, her veins are small, not suitable for use in fistula creation.  I therefore discussed with her the option of having dialysis graft placed in the left upper arm.  Risks and benefits were discussed.  Risks include, but not limited to, bleeding, infection, arterial steal syndrome which if severe enough would require ligation of the dialysis graft, nerve injury, seroma formation, seroma leak, and perioperative anesthetic complications.  I told patient that the graft does not have a good long-term patency rate, compared to fistula.  Patient indicated understanding and would like to proceed.  She also would like to have the " peritoneal dialysis catheter removed at the same time.  At her request, the procedures will be performed on September 23, 2024, pending operating room availability.  I answered all of her questions.        Denis Brown MD  [facility]   Voalte preferred or call my office [phone]  __________________________________________________________________  Patient:Anu Spaulding Kaleb   MRN:[id number]   CSN:[id number]

## 2024-09-11 ENCOUNTER — TELEPHONE (OUTPATIENT)
Dept: VASCULAR SURGERY | Facility: MEDICAL CENTER | Age: 69
End: 2024-09-11
Payer: MEDICARE

## 2024-09-11 NOTE — TELEPHONE ENCOUNTER
I called patient and scheduled procedure with Dr. Brown for 9/23 @ 11:30 am. Patient needs to check in @ 9:30 am in the [facility] 1st floor.    Patient instructed nothing to eat or drink 8 hours prior and will need a .

## 2024-09-12 ENCOUNTER — APPOINTMENT (OUTPATIENT)
Dept: ADMISSIONS | Facility: MEDICAL CENTER | Age: 69
End: 2024-09-12
Attending: SURGERY
Payer: MEDICARE

## 2024-09-12 DIAGNOSIS — E10.9 TYPE 1 DIABETES MELLITUS ON INSULIN THERAPY (HCC): ICD-10-CM

## 2024-09-12 RX ORDER — BLOOD-GLUCOSE SENSOR
EACH MISCELLANEOUS
Qty: 6 EACH | Refills: 3 | Status: SHIPPED | OUTPATIENT
Start: 2024-09-12 | End: 2024-09-16 | Stop reason: SDUPTHER

## 2024-09-16 ENCOUNTER — TELEPHONE (OUTPATIENT)
Dept: ENDOCRINOLOGY | Facility: MEDICAL CENTER | Age: 69
End: 2024-09-16
Payer: MEDICARE

## 2024-09-16 DIAGNOSIS — E10.9 TYPE 1 DIABETES MELLITUS ON INSULIN THERAPY (HCC): ICD-10-CM

## 2024-09-16 NOTE — TELEPHONE ENCOUNTER
Received request via: Pharmacy    Was the patient seen in the last year in this department? Yes    Does the patient have an active prescription (recently filled or refills available) for medication(s) requested? No    [facility]       Pt also has concerns about constant lows which she has checked by finger sticks

## 2024-09-16 NOTE — TELEPHONE ENCOUNTER
VOICEMAIL  1. Caller Name: Anu Manuel                      Call Back Number: There are no phone numbers on file.    2. Message: The Freestyle lite sensors are out of stock til January she wants to know if you have any options for her. She is thinking about buying a cheap glucose meter over the counter.   Please advise thank you.

## 2024-09-17 ENCOUNTER — PRE-ADMISSION TESTING (OUTPATIENT)
Dept: ADMISSIONS | Facility: MEDICAL CENTER | Age: 69
End: 2024-09-17
Attending: SURGERY
Payer: MEDICARE

## 2024-09-17 NOTE — OR NURSING
"Pre-Admit RN appointment completed with pt, by this RN, over the phone, for 9/23/24. Discussed pre-procedure instructions, unless they have been otherwise instructed by their surgeon. Discussed post-op expectations for pain, and approximate duration of time in PACU etc. Instructed pt on medication instructions from \"Guideline for Pre-Operative Medication Management\", unless otherwise instructed by prescribing MD or Surgeon. Pt verbalized understanding of all instructions. Pt denies any questions or concerns. Copy of Medication Reconciliation with instructions provided to pt via Email.     "

## 2024-09-18 ENCOUNTER — TELEPHONE (OUTPATIENT)
Dept: VASCULAR SURGERY | Facility: MEDICAL CENTER | Age: 69
End: 2024-09-18
Payer: MEDICARE

## 2024-09-18 RX ORDER — BLOOD-GLUCOSE SENSOR
1 EACH MISCELLANEOUS
Qty: 9 EACH | Refills: 3 | Status: SHIPPED | OUTPATIENT
Start: 2024-09-18 | End: 2024-09-22

## 2024-09-18 NOTE — TELEPHONE ENCOUNTER
I called patient and left a message to confirm upcoming procedure with Dr. Brown for 9/23 @ 11:30 am. Patient is to check in @ 9:30 am in the [address] floor.

## 2024-09-19 DIAGNOSIS — E10.9 TYPE 1 DIABETES MELLITUS ON INSULIN THERAPY (HCC): ICD-10-CM

## 2024-09-22 RX ORDER — BLOOD-GLUCOSE SENSOR
EACH MISCELLANEOUS
Qty: 6 EACH | Refills: 3 | Status: SHIPPED | OUTPATIENT
Start: 2024-09-22

## 2024-09-23 ENCOUNTER — ANESTHESIA EVENT (OUTPATIENT)
Dept: SURGERY | Facility: MEDICAL CENTER | Age: 69
End: 2024-09-23
Payer: MEDICARE

## 2024-09-23 ENCOUNTER — ANESTHESIA (OUTPATIENT)
Dept: SURGERY | Facility: MEDICAL CENTER | Age: 69
End: 2024-09-23
Payer: MEDICARE

## 2024-09-23 ENCOUNTER — HOSPITAL ENCOUNTER (OUTPATIENT)
Facility: MEDICAL CENTER | Age: 69
End: 2024-09-23
Attending: SURGERY | Admitting: SURGERY
Payer: MEDICARE

## 2024-09-23 VITALS
RESPIRATION RATE: 16 BRPM | SYSTOLIC BLOOD PRESSURE: 122 MMHG | OXYGEN SATURATION: 93 % | BODY MASS INDEX: 20.34 KG/M2 | HEIGHT: 66 IN | HEART RATE: 83 BPM | DIASTOLIC BLOOD PRESSURE: 60 MMHG | WEIGHT: 126.54 LBS | TEMPERATURE: 98.6 F

## 2024-09-23 LAB
ANION GAP SERPL CALC-SCNC: 11 MMOL/L (ref 7–16)
BUN SERPL-MCNC: 19 MG/DL (ref 8–22)
CALCIUM SERPL-MCNC: 8.8 MG/DL (ref 8.5–10.5)
CHLORIDE SERPL-SCNC: 97 MMOL/L (ref 96–112)
CO2 SERPL-SCNC: 26 MMOL/L (ref 20–33)
CREAT SERPL-MCNC: 2.1 MG/DL (ref 0.5–1.4)
ERYTHROCYTE [DISTWIDTH] IN BLOOD BY AUTOMATED COUNT: 59.1 FL (ref 35.9–50)
GFR SERPLBLD CREATININE-BSD FMLA CKD-EPI: 25 ML/MIN/1.73 M 2
GLUCOSE BLD STRIP.AUTO-MCNC: 176 MG/DL (ref 65–99)
GLUCOSE BLD STRIP.AUTO-MCNC: 198 MG/DL (ref 65–99)
GLUCOSE SERPL-MCNC: 182 MG/DL (ref 65–99)
HCT VFR BLD AUTO: 35.7 % (ref 37–47)
HGB BLD-MCNC: 11.7 G/DL (ref 12–16)
MCH RBC QN AUTO: 31.8 PG (ref 27–33)
MCHC RBC AUTO-ENTMCNC: 32.8 G/DL (ref 32.2–35.5)
MCV RBC AUTO: 97 FL (ref 81.4–97.8)
PLATELET # BLD AUTO: 182 K/UL (ref 164–446)
PMV BLD AUTO: 9.9 FL (ref 9–12.9)
POTASSIUM SERPL-SCNC: 4.2 MMOL/L (ref 3.6–5.5)
RBC # BLD AUTO: 3.68 M/UL (ref 4.2–5.4)
SODIUM SERPL-SCNC: 134 MMOL/L (ref 135–145)
WBC # BLD AUTO: 7.5 K/UL (ref 4.8–10.8)

## 2024-09-23 PROCEDURE — 160028 HCHG SURGERY MINUTES - 1ST 30 MINS LEVEL 3: Performed by: SURGERY

## 2024-09-23 PROCEDURE — 160046 HCHG PACU - 1ST 60 MINS PHASE II: Performed by: SURGERY

## 2024-09-23 PROCEDURE — C1713 ANCHOR/SCREW BN/BN,TIS/BN: HCPCS | Performed by: SURGERY

## 2024-09-23 PROCEDURE — 80048 BASIC METABOLIC PNL TOTAL CA: CPT

## 2024-09-23 PROCEDURE — 36830 ARTERY-VEIN NONAUTOGRAFT: CPT | Performed by: SURGERY

## 2024-09-23 PROCEDURE — 700105 HCHG RX REV CODE 258: Performed by: SURGERY

## 2024-09-23 PROCEDURE — 160002 HCHG RECOVERY MINUTES (STAT): Performed by: SURGERY

## 2024-09-23 PROCEDURE — 700111 HCHG RX REV CODE 636 W/ 250 OVERRIDE (IP): Mod: JG | Performed by: SURGERY

## 2024-09-23 PROCEDURE — 700105 HCHG RX REV CODE 258: Performed by: STUDENT IN AN ORGANIZED HEALTH CARE EDUCATION/TRAINING PROGRAM

## 2024-09-23 PROCEDURE — 36415 COLL VENOUS BLD VENIPUNCTURE: CPT

## 2024-09-23 PROCEDURE — 160048 HCHG OR STATISTICAL LEVEL 1-5: Performed by: SURGERY

## 2024-09-23 PROCEDURE — 85027 COMPLETE CBC AUTOMATED: CPT

## 2024-09-23 PROCEDURE — A9270 NON-COVERED ITEM OR SERVICE: HCPCS | Performed by: STUDENT IN AN ORGANIZED HEALTH CARE EDUCATION/TRAINING PROGRAM

## 2024-09-23 PROCEDURE — 82962 GLUCOSE BLOOD TEST: CPT

## 2024-09-23 PROCEDURE — 700111 HCHG RX REV CODE 636 W/ 250 OVERRIDE (IP): Performed by: STUDENT IN AN ORGANIZED HEALTH CARE EDUCATION/TRAINING PROGRAM

## 2024-09-23 PROCEDURE — 700101 HCHG RX REV CODE 250: Performed by: STUDENT IN AN ORGANIZED HEALTH CARE EDUCATION/TRAINING PROGRAM

## 2024-09-23 PROCEDURE — 49422 REMOVE TUNNELED IP CATH: CPT | Performed by: SURGERY

## 2024-09-23 PROCEDURE — 700102 HCHG RX REV CODE 250 W/ 637 OVERRIDE(OP): Performed by: STUDENT IN AN ORGANIZED HEALTH CARE EDUCATION/TRAINING PROGRAM

## 2024-09-23 PROCEDURE — 160039 HCHG SURGERY MINUTES - EA ADDL 1 MIN LEVEL 3: Performed by: SURGERY

## 2024-09-23 PROCEDURE — 160009 HCHG ANES TIME/MIN: Performed by: SURGERY

## 2024-09-23 PROCEDURE — 160035 HCHG PACU - 1ST 60 MINS PHASE I: Performed by: SURGERY

## 2024-09-23 PROCEDURE — C1768 GRAFT, VASCULAR: HCPCS | Performed by: SURGERY

## 2024-09-23 PROCEDURE — 160036 HCHG PACU - EA ADDL 30 MINS PHASE I: Performed by: SURGERY

## 2024-09-23 PROCEDURE — 160025 RECOVERY II MINUTES (STATS): Performed by: SURGERY

## 2024-09-23 PROCEDURE — 700111 HCHG RX REV CODE 636 W/ 250 OVERRIDE (IP): Mod: JZ | Performed by: STUDENT IN AN ORGANIZED HEALTH CARE EDUCATION/TRAINING PROGRAM

## 2024-09-23 DEVICE — GRAFT GOR STRCH 4-7X45CM: Type: IMPLANTABLE DEVICE | Status: FUNCTIONAL

## 2024-09-23 RX ORDER — HALOPERIDOL 5 MG/ML
1 INJECTION INTRAMUSCULAR
Status: DISCONTINUED | OUTPATIENT
Start: 2024-09-23 | End: 2024-09-23 | Stop reason: HOSPADM

## 2024-09-23 RX ORDER — SODIUM CHLORIDE 9 MG/ML
INJECTION, SOLUTION INTRAVENOUS ONCE
Status: COMPLETED | OUTPATIENT
Start: 2024-09-23 | End: 2024-09-23

## 2024-09-23 RX ORDER — INSULIN LISPRO 100 [IU]/ML
2-9 INJECTION, SOLUTION INTRAVENOUS; SUBCUTANEOUS EVERY 6 HOURS
Status: DISCONTINUED | OUTPATIENT
Start: 2024-09-23 | End: 2024-09-23 | Stop reason: HOSPADM

## 2024-09-23 RX ORDER — DEXTROSE MONOHYDRATE 25 G/50ML
25 INJECTION, SOLUTION INTRAVENOUS
Status: DISCONTINUED | OUTPATIENT
Start: 2024-09-23 | End: 2024-09-23 | Stop reason: HOSPADM

## 2024-09-23 RX ORDER — KETAMINE HYDROCHLORIDE 50 MG/ML
INJECTION, SOLUTION, CONCENTRATE INTRAMUSCULAR; INTRAVENOUS PRN
Status: DISCONTINUED | OUTPATIENT
Start: 2024-09-23 | End: 2024-09-23 | Stop reason: SURG

## 2024-09-23 RX ORDER — ATROPINE SULFATE 0.4 MG/ML
INJECTION, SOLUTION INTRAVENOUS PRN
Status: DISCONTINUED | OUTPATIENT
Start: 2024-09-23 | End: 2024-09-23 | Stop reason: SURG

## 2024-09-23 RX ORDER — HYDRALAZINE HYDROCHLORIDE 20 MG/ML
5 INJECTION INTRAMUSCULAR; INTRAVENOUS
Status: DISCONTINUED | OUTPATIENT
Start: 2024-09-23 | End: 2024-09-23 | Stop reason: HOSPADM

## 2024-09-23 RX ORDER — OXYCODONE HCL 5 MG/5 ML
5 SOLUTION, ORAL ORAL
Status: COMPLETED | OUTPATIENT
Start: 2024-09-23 | End: 2024-09-23

## 2024-09-23 RX ORDER — EPHEDRINE SULFATE 50 MG/ML
INJECTION, SOLUTION INTRAVENOUS PRN
Status: DISCONTINUED | OUTPATIENT
Start: 2024-09-23 | End: 2024-09-23 | Stop reason: SURG

## 2024-09-23 RX ORDER — IPRATROPIUM BROMIDE AND ALBUTEROL SULFATE 2.5; .5 MG/3ML; MG/3ML
3 SOLUTION RESPIRATORY (INHALATION)
Status: DISCONTINUED | OUTPATIENT
Start: 2024-09-23 | End: 2024-09-23 | Stop reason: HOSPADM

## 2024-09-23 RX ORDER — LIDOCAINE HYDROCHLORIDE 20 MG/ML
INJECTION, SOLUTION EPIDURAL; INFILTRATION; INTRACAUDAL; PERINEURAL PRN
Status: DISCONTINUED | OUTPATIENT
Start: 2024-09-23 | End: 2024-09-23 | Stop reason: SURG

## 2024-09-23 RX ORDER — HEPARIN SODIUM,PORCINE 1000/ML
VIAL (ML) INJECTION PRN
Status: DISCONTINUED | OUTPATIENT
Start: 2024-09-23 | End: 2024-09-23 | Stop reason: SURG

## 2024-09-23 RX ORDER — DIPHENHYDRAMINE HYDROCHLORIDE 50 MG/ML
12.5 INJECTION INTRAMUSCULAR; INTRAVENOUS
Status: DISCONTINUED | OUTPATIENT
Start: 2024-09-23 | End: 2024-09-23 | Stop reason: HOSPADM

## 2024-09-23 RX ORDER — CEFAZOLIN SODIUM 1 G/3ML
INJECTION, POWDER, FOR SOLUTION INTRAMUSCULAR; INTRAVENOUS PRN
Status: DISCONTINUED | OUTPATIENT
Start: 2024-09-23 | End: 2024-09-23 | Stop reason: SURG

## 2024-09-23 RX ORDER — PHENYLEPHRINE HYDROCHLORIDE 10 MG/ML
INJECTION, SOLUTION INTRAMUSCULAR; INTRAVENOUS; SUBCUTANEOUS PRN
Status: DISCONTINUED | OUTPATIENT
Start: 2024-09-23 | End: 2024-09-23 | Stop reason: SURG

## 2024-09-23 RX ORDER — EPHEDRINE SULFATE 50 MG/ML
5 INJECTION, SOLUTION INTRAVENOUS
Status: DISCONTINUED | OUTPATIENT
Start: 2024-09-23 | End: 2024-09-23 | Stop reason: HOSPADM

## 2024-09-23 RX ORDER — METOPROLOL TARTRATE 1 MG/ML
1 INJECTION, SOLUTION INTRAVENOUS
Status: DISCONTINUED | OUTPATIENT
Start: 2024-09-23 | End: 2024-09-23 | Stop reason: HOSPADM

## 2024-09-23 RX ORDER — SODIUM CHLORIDE 9 MG/ML
INJECTION, SOLUTION INTRAVENOUS
Status: DISCONTINUED | OUTPATIENT
Start: 2024-09-23 | End: 2024-09-23 | Stop reason: SURG

## 2024-09-23 RX ORDER — ONDANSETRON 2 MG/ML
INJECTION INTRAMUSCULAR; INTRAVENOUS PRN
Status: DISCONTINUED | OUTPATIENT
Start: 2024-09-23 | End: 2024-09-23 | Stop reason: SURG

## 2024-09-23 RX ORDER — ONDANSETRON 2 MG/ML
4 INJECTION INTRAMUSCULAR; INTRAVENOUS
Status: DISCONTINUED | OUTPATIENT
Start: 2024-09-23 | End: 2024-09-23 | Stop reason: HOSPADM

## 2024-09-23 RX ORDER — LABETALOL HYDROCHLORIDE 5 MG/ML
5 INJECTION, SOLUTION INTRAVENOUS
Status: DISCONTINUED | OUTPATIENT
Start: 2024-09-23 | End: 2024-09-23 | Stop reason: HOSPADM

## 2024-09-23 RX ORDER — BUPIVACAINE HYDROCHLORIDE 5 MG/ML
INJECTION, SOLUTION EPIDURAL; INTRACAUDAL
Status: DISCONTINUED | OUTPATIENT
Start: 2024-09-23 | End: 2024-09-23 | Stop reason: HOSPADM

## 2024-09-23 RX ORDER — OXYCODONE HCL 5 MG/5 ML
10 SOLUTION, ORAL ORAL
Status: COMPLETED | OUTPATIENT
Start: 2024-09-23 | End: 2024-09-23

## 2024-09-23 RX ORDER — PROTAMINE SULFATE 10 MG/ML
INJECTION, SOLUTION INTRAVENOUS PRN
Status: DISCONTINUED | OUTPATIENT
Start: 2024-09-23 | End: 2024-09-23 | Stop reason: SURG

## 2024-09-23 RX ORDER — DEXAMETHASONE SODIUM PHOSPHATE 4 MG/ML
INJECTION, SOLUTION INTRA-ARTICULAR; INTRALESIONAL; INTRAMUSCULAR; INTRAVENOUS; SOFT TISSUE PRN
Status: DISCONTINUED | OUTPATIENT
Start: 2024-09-23 | End: 2024-09-23 | Stop reason: SURG

## 2024-09-23 RX ADMIN — SUGAMMADEX 200 MG: 100 INJECTION, SOLUTION INTRAVENOUS at 12:44

## 2024-09-23 RX ADMIN — CEFAZOLIN 2 G: 1 INJECTION, POWDER, FOR SOLUTION INTRAMUSCULAR; INTRAVENOUS at 11:40

## 2024-09-23 RX ADMIN — DEXAMETHASONE SODIUM PHOSPHATE 8 MG: 4 INJECTION INTRA-ARTICULAR; INTRALESIONAL; INTRAMUSCULAR; INTRAVENOUS; SOFT TISSUE at 11:40

## 2024-09-23 RX ADMIN — HEPARIN SODIUM 3000 UNITS: 1000 INJECTION, SOLUTION INTRAVENOUS; SUBCUTANEOUS at 12:13

## 2024-09-23 RX ADMIN — PROTAMINE SULFATE 20 MG: 10 INJECTION, SOLUTION INTRAVENOUS at 12:32

## 2024-09-23 RX ADMIN — EPHEDRINE SULFATE 10 MG: 50 INJECTION, SOLUTION INTRAVENOUS at 12:16

## 2024-09-23 RX ADMIN — FENTANYL CITRATE 50 MCG: 50 INJECTION, SOLUTION INTRAMUSCULAR; INTRAVENOUS at 13:30

## 2024-09-23 RX ADMIN — EPHEDRINE SULFATE 10 MG: 50 INJECTION, SOLUTION INTRAVENOUS at 12:15

## 2024-09-23 RX ADMIN — ATROPINE SULFATE 0.4 MG: 0.4 INJECTION, SOLUTION INTRAVENOUS at 12:19

## 2024-09-23 RX ADMIN — FENTANYL CITRATE 25 MCG: 50 INJECTION, SOLUTION INTRAMUSCULAR; INTRAVENOUS at 14:00

## 2024-09-23 RX ADMIN — PROPOFOL 35 MG: 10 INJECTION, EMULSION INTRAVENOUS at 11:43

## 2024-09-23 RX ADMIN — ROCURONIUM BROMIDE 40 MG: 10 INJECTION, SOLUTION INTRAVENOUS at 11:41

## 2024-09-23 RX ADMIN — SODIUM CHLORIDE: 9 INJECTION, SOLUTION INTRAVENOUS at 10:44

## 2024-09-23 RX ADMIN — PROPOFOL 30 MG: 10 INJECTION, EMULSION INTRAVENOUS at 12:44

## 2024-09-23 RX ADMIN — PROPOFOL 20 MG: 10 INJECTION, EMULSION INTRAVENOUS at 12:48

## 2024-09-23 RX ADMIN — PROTAMINE SULFATE 10 MG: 10 INJECTION, SOLUTION INTRAVENOUS at 12:39

## 2024-09-23 RX ADMIN — FENTANYL CITRATE 50 MCG: 50 INJECTION, SOLUTION INTRAMUSCULAR; INTRAVENOUS at 11:40

## 2024-09-23 RX ADMIN — PROPOFOL 50 MG: 10 INJECTION, EMULSION INTRAVENOUS at 12:57

## 2024-09-23 RX ADMIN — SODIUM CHLORIDE: 9 INJECTION, SOLUTION INTRAVENOUS at 11:36

## 2024-09-23 RX ADMIN — PHENYLEPHRINE HYDROCHLORIDE 100 MCG: 10 INJECTION INTRAVENOUS at 12:18

## 2024-09-23 RX ADMIN — PROPOFOL 50 MG: 10 INJECTION, EMULSION INTRAVENOUS at 11:40

## 2024-09-23 RX ADMIN — INSULIN LISPRO 2 UNITS: 100 INJECTION, SOLUTION INTRAVENOUS; SUBCUTANEOUS at 13:34

## 2024-09-23 RX ADMIN — FENTANYL CITRATE 25 MCG: 50 INJECTION, SOLUTION INTRAMUSCULAR; INTRAVENOUS at 12:42

## 2024-09-23 RX ADMIN — FENTANYL CITRATE 25 MCG: 50 INJECTION, SOLUTION INTRAMUSCULAR; INTRAVENOUS at 12:36

## 2024-09-23 RX ADMIN — EPHEDRINE SULFATE 10 MG: 50 INJECTION, SOLUTION INTRAVENOUS at 12:18

## 2024-09-23 RX ADMIN — ONDANSETRON 4 MG: 2 INJECTION INTRAMUSCULAR; INTRAVENOUS at 11:40

## 2024-09-23 RX ADMIN — Medication 50 MG: at 11:40

## 2024-09-23 RX ADMIN — PROTAMINE SULFATE 20 MG: 10 INJECTION, SOLUTION INTRAVENOUS at 12:34

## 2024-09-23 RX ADMIN — FENTANYL CITRATE 50 MCG: 50 INJECTION, SOLUTION INTRAMUSCULAR; INTRAVENOUS at 13:40

## 2024-09-23 RX ADMIN — OXYCODONE HYDROCHLORIDE 10 MG: 5 SOLUTION ORAL at 13:30

## 2024-09-23 RX ADMIN — LIDOCAINE HYDROCHLORIDE 100 MG: 20 INJECTION, SOLUTION EPIDURAL; INFILTRATION; INTRACAUDAL at 11:38

## 2024-09-23 ASSESSMENT — FIBROSIS 4 INDEX
FIB4 SCORE: 3.71
FIB4 SCORE: 3.71

## 2024-09-23 ASSESSMENT — PAIN DESCRIPTION - PAIN TYPE
TYPE: SURGICAL PAIN

## 2024-09-23 ASSESSMENT — PAIN SCALES - GENERAL: PAIN_LEVEL: 0

## 2024-09-23 NOTE — OP REPORT
DATE OF SERVICE:  09/23/2024     SURGEON:  Denis Brown MD     ASSISTANT:  Geneva Muniz PA-C     ANESTHESIOLOGIST:  Morgan Temple MD     TYPE OF ANESTHESIA:  General anesthesia and local anesthesia with 30 mL of   0.5% Marcaine with epinephrine solution.     PREOPERATIVE DIAGNOSIS:  End-stage renal disease.     POSTOPERATIVE DIAGNOSIS:  End-stage renal disease.     PROCEDURES:    1.  Placement of left upper arm dialysis graft using 4-7 mm stepped PTFE   graft.  2.  Removal of peritoneal dialysis catheter.     INDICATIONS FOR PROCEDURE:  This is a pleasant 69-year-old female with   multiple medical problems including end-stage renal disease, who was on   peritoneal dialysis, but then switched to hemodialysis via a right IJ   Perm-A-Cath.  The patient was referred to see me for dialysis access creation   as well as removal of the peritoneal dialysis catheter.  Discussion was made   with her.  She would like to undergo above procedure, fully understanding all   risks.     DESCRIPTION OF PROCEDURE:  Informed consent was obtained.  The patient was   taken to the operating room and was placed in the supine position.  Sequential   compression device was applied.  The patient was given Ancef intravenously.    General anesthesia was induced.     Next, left upper extremity as well as abdomen along with the indwelling   peritoneal dialysis catheter was sterilely prepped and draped in the normal   fashion.  A timeout procedure was done.  The skin and subcutaneous tissues   over the brachial artery in the distal upper arm as well as in the axillary   area was anesthetized with Marcaine solution.  An incision was made over each   of the areas.  The incision was extended through subcutaneous tissue using the   electrocautery.  The brachial artery was identified and carefully dissected   free from the surrounding tissues.  The axillary vein was also identified and   carefully dissected free from the surrounding  tissues.     A 4-7 stepped PTFE graft was brought through subcutaneous tunnel connecting   the brachial artery with the axillary vein area.     The patient was given heparin 3000 units intravenously.  After the heparin was   allowed to circulate systemically for 3 minutes, vascular control of the   brachial artery was obtained with vascular clamps.  An arteriotomy was made on   the brachial artery.  The 4 mm part of the graft was anastomosed end-to-side   to the brachial artery using running 6-0 Prolene suture.  After this was done,   backbleeding and flushing was obtained.  The flow was restored through the   graft and then into the distal brachial artery.     Next, vascular control of the axillary vein was obtained with vascular clamps.    The graft was cut to appropriate length, bevelled, and anastomosed   end-to-side to the brachial artery using running 6-0 Prolene suture.  Prior to   completing the anastomosis, backbleeding and flushing was obtained.    Anastomosis was completed.  Flow was restored distally.  A sterile Doppler   probe was brought into the operative field.  Excellent Doppler flow signal was   obtained over the axillary vein distal to the anastomosis.  Excellent Doppler   flow signal with significant diastolic flow component was also obtained over   the brachial artery proximal and distal to the anastomosis.     The patient was given protamine 30 mg intravenously.  The wounds were   irrigated and were found to have excellent hemostasis.  The wounds were closed   subcutaneously with running 3-0 Vicryl suture and subcuticularly with 4-0   Monocryl suture.  The wounds were cleaned and sterile dressing was applied.     Next, attention was then turned to removing the peritoneal dialysis catheter.    An incision was made over the distal cuff.  The incision was extended through   subcutaneous tissue using the electrocautery.  The catheter was identified   and carefully dissected free from surrounding  tissue.  The distal cuff was   dissected free from the peritoneum.  The catheter was removed with no   difficulty.  The peritoneum opening was closed with a 3-0 Vicryl suture.  The   proximal part of the catheter was also removed along with the proximal cuff.     The wounds were irrigated and were found to have excellent hemostasis.  The   wounds were anesthetized with Marcaine solution.  The wounds were closed   subcutaneously with running 3-0 Vicryl suture and subcuticularly with 4-0   Monocryl suture.  The wounds were cleaned and sterile dressing was applied.    The wound at the old catheter exit site was not closed to prevent infection.     ESTIMATED BLOOD LOSS:  5 mL.     INTRAVENOUS FLUIDS:  500 mL.     COUNTS:  Sponge and instrument count was correct x2.     The patient was then awakened, extubated, taken to recovery area in stable   condition with good bruits over the graft and intact neurovascular examination   of the left hand.        ______________________________  MD LENIN Montelongo/MAAME    DD:  09/23/2024 13:17  DT:  09/23/2024 14:42    Job#:  [id number]

## 2024-09-23 NOTE — OR SURGEON
Immediate Post OP Note    PreOp Diagnosis: End-stage renal disease.  Same.      PostOp Diagnosis: Same.      Procedure(s):  1) Placement of left upper arm dialysis graft - Wound Class: Clean  2) Removal of peritoneal dialysis catheter - Wound Class: Clean Contaminated    Surgeon(s):  Denis Brown M.D.    Anesthesiologist/Type of Anesthesia:  Anesthesiologist: Morgan Temple M.D./General    Surgical Staff:  Circulator: Shannan Mckeon R.N.  Relief Circulator: Seble Junior R.N.  Relief Scrub: Francesca Knight  Scrub Person: Sita Davis  First Assist: Geneva Muniz P.A.-C.    Specimens removed if any:  * No specimens in log *    Estimated Blood Loss: 5 mL.    IV fluids: 500 mL.    Findings: Good bruits post graft placement.    Complications: None.    Dictated, #[id number].        9/23/2024 1:09 PM Denis Brown M.D.

## 2024-09-23 NOTE — ANESTHESIA PREPROCEDURE EVALUATION
Case: [id number] Date/Time: 09/23/24 1115    Procedures:       CREATION OF LEFT UPPER EXTREMITY DIALYSIS GRAFT AND REMOVAL OF PERITONEAL DIALYSIS CATHETER      REMOVAL, CATHETER, CAPD    Pre-op diagnosis: END STAGE RENAL DISEASE    Location: [facility] OR 09 / SURGERY [facility]    Surgeons: Denis Brown M.D.            Relevant Problems   PULMONARY   (positive) Chronic obstructive pulmonary disease      CARDIAC   (positive) Aortic atherosclerosis (HCC)   (positive) CAD S/P percutaneous coronary angioplasty   (positive) Primary hypertension      GI   (positive) GERD (gastroesophageal reflux disease)         (positive) RHEA (acute kidney injury) (HCC)   (positive) CKD (chronic kidney disease) stage 4, GFR 15-29 ml/min (HCC)   (positive) ESRD needing dialysis (HCC)   (positive) Liver failure without hepatic coma (HCC)   (positive) Stage 3a chronic kidney disease   (positive) Stage 3b chronic kidney disease      ENDO   (positive) Hypothyroidism, postsurgical   (positive) Type 1 diabetes mellitus on insulin therapy (HCC)       Physical Exam    Airway   Mallampati: II  TM distance: >3 FB  Neck ROM: full       Cardiovascular - normal exam  Rhythm: regular  Rate: normal  (-) murmur     Dental - normal exam           Pulmonary - normal exam  Breath sounds clear to auscultation     Abdominal    Neurological - normal exam                 Anesthesia Plan    ASA 4   ASA physical status 4 criteria: uncorrected/decompensated heart disease and severe reduction of ejection fractions    Plan - general       Airway plan will be ETT          Induction: intravenous    Postoperative Plan: Postoperative administration of opioids is intended.    Pertinent diagnostic labs and testing reviewed    Informed Consent:    Anesthetic plan and risks discussed with patient.    Use of blood products discussed with: patient whom consented to blood products.

## 2024-09-23 NOTE — DISCHARGE INSTRUCTIONS
HOME CARE INSTRUCTIONS    ) Activities: No lifting more than half gallon of milk using left hand for 2 weeks.    Keep left arm straight and elevated as much as possible.   No blood pressure/IVs/blood draws/sleeping on left arm.   NO ice pack to left arm incisions.   May remove dressing after 3 days.     2) Medications: Resume home medications.     3) Follow-up with Dr. Brown in 2 to 3 weeks.  Call [phone] for appointment and for any problem, especially numbness, tingling, or discoloration of left hand/fingers.     ACTIVITY: Rest and take it easy for the first 24 hours.  A responsible adult is recommended to remain with you during that time.  It is normal to feel sleepy.  We encourage you to not do anything that requires balance, judgment or coordination.    FOR 24 HOURS DO NOT:  Drive, operate machinery or run household appliances.  Drink beer or alcoholic beverages.  Make important decisions or sign legal documents.    SPECIAL INSTRUCTIONS:  1) Activities: No lifting more than half gallon of milk using left hand for 2 weeks.    Keep left arm straight and elevated as much as possible.  No blood pressure/IVs/blood draws/sleeping on left arm.  NO ice pack to left arm incisions.  May remove dressing after 3 days.    2) Medications: Resume home medications.    3) Follow-up with Dr. Brown in 2 to 3 weeks.  Call [phone] for appointment and for any problem, especially numbness, tingling, or discoloration of left hand/fingers.    DIET: To avoid nausea, slowly advance diet as tolerated, avoiding spicy or greasy foods for the first day.  Add more substantial food to your diet according to your physician's instructions.  Babies can be fed formula or breast milk as soon as they are hungry.  INCREASE FLUIDS AND FIBER TO AVOID CONSTIPATION.    SURGICAL DRESSING/BATHING: Remove dressing in 3 days.    MEDICATIONS: Resume taking daily medication.  Take prescribed pain medication with food.  If no medication is prescribed, you  may take non-aspirin pain medication if needed.  PAIN MEDICATION CAN BE VERY CONSTIPATING.  Take a stool softener or laxative such as senokot, pericolace, or milk of magnesia if needed.    Last pain medication given at 1 pm.    A follow-up appointment should be arranged with your doctor in 1-2 weeks; call to schedule.    You should CALL YOUR PHYSICIAN if you develop:  Fever greater than 101 degrees F.  Pain not relieved by medication, or persistent nausea or vomiting.  Excessive bleeding (blood soaking through dressing) or unexpected drainage from the wound.  Extreme redness or swelling around the incision site, drainage of pus or foul smelling drainage.  Inability to urinate or empty your bladder within 8 hours.  Problems with breathing or chest pain.    You should call 911 if you develop problems with breathing or chest pain.  If you are unable to contact your doctor or surgical center, you should go to the nearest emergency room or urgent care center.  Physician's telephone #: [phone]    MILD FLU-LIKE SYMPTOMS ARE NORMAL.  YOU MAY EXPERIENCE GENERALIZED MUSCLE ACHES, THROAT IRRITATION, HEADACHE AND/OR SOME NAUSEA.    If any questions arise, call your doctor.  If your doctor is not available, please feel free to call the Surgical Center at [phone].  The Center is open Monday through Friday from 7AM to 7PM.      A registered nurse may call you a few days after your surgery to see how you are doing after your procedure.    You may also receive a survey in the mail within the next two weeks and we ask that you take a few moments to complete the survey and return it to us.  Our goal is to provide you with very good care and we value your comments.     Depression / Suicide Risk    As you are discharged from this [facility] facility, it is important to learn how to keep safe from harming yourself.    Recognize the warning signs:  Abrupt changes in personality, positive or negative- including increase in  energy   Giving away possessions  Change in eating patterns- significant weight changes-  positive or negative  Change in sleeping patterns- unable to sleep or sleeping all the time   Unwillingness or inability to communicate  Depression  Unusual sadness, discouragement and loneliness  Talk of wanting to die  Neglect of personal appearance   Rebelliousness- reckless behavior  Withdrawal from people/activities they love  Confusion- inability to concentrate     If you or a loved one observes any of these behaviors or has concerns about self-harm, here's what you can do:  Talk about it- your feelings and reasons for harming yourself  Remove any means that you might use to hurt yourself (examples: pills, rope, extension cords, firearm)  Get professional help from the community (Mental Health, Substance Abuse, psychological counseling)  Do not be alone:Call your Safe Contact- someone whom you trust who will be there for you.  Call your local CRISIS HOTLINE 113-3619 or 730-506-3776  Call your local Children's Mobile Crisis Response Team [place] (778) 242-7834 or www.[employer]  Call the toll free National Suicide Prevention Hotlines   National Suicide Prevention Lifeline 915-722-ZTZU (7299)  National Hope Line Network 800-SUICIDE (481-7219)    I acknowledge receipt and understanding of these Home Care instructions.

## 2024-09-23 NOTE — ANESTHESIA TIME REPORT
Anesthesia Start and Stop Event Times       Date Time Event    9/23/2024 1130 Ready for Procedure     1136 Anesthesia Start     1309 Anesthesia Stop          Responsible Staff  09/23/24      Name Role Begin End    Morgan Temple M.D. Anesth 1136 1309          Overtime Reason:  no overtime (within assigned shift)    Comments:

## 2024-09-23 NOTE — ANESTHESIA PROCEDURE NOTES
Airway    Date/Time: 9/23/2024 11:44 AM    Performed by: Morgan Temple M.D.  Authorized by: Morgan Temple M.D.    Location:  OR  Urgency:  Elective  Indications for Airway Management:  Anesthesia      Spontaneous Ventilation: absent    Sedation Level:  Deep  Preoxygenated: Yes    Patient Position:  Sniffing  Final Airway Type:  Endotracheal airway  Final Endotracheal Airway:  ETT  Cuffed: Yes    Technique Used for Successful ETT Placement:  Direct laryngoscopy    Insertion Site:  Oral  Blade Type:  Howard  Laryngoscope Blade/Videolaryngoscope Blade Size:  2  ETT Size (mm):  7.0  Measured from:  Teeth  ETT to Teeth (cm):  21  Placement Verified by: auscultation and capnometry    Cormack-Lehane Classification:  Grade I - full view of glottis  Number of Attempts at Approach:  1  Ventilation Between Attempts:  None  Number of Other Approaches Attempted:  0

## 2024-09-23 NOTE — PROGRESS NOTES
To Phase II from PACU. Ambulated to chair from [facility]. Pain 3/10 but tolerable per the patient. No reports of nausea. Patient IV removed prior to discharge. Discharge instructions discussed with patient and ride. Patient taken to family car by this RN.

## 2024-09-23 NOTE — ANESTHESIA POSTPROCEDURE EVALUATION
Patient: Anu Manuel    Procedure Summary       Date: 09/23/24 Room / Location: [facility] 09 / SURGERY [facility]    Anesthesia Start: 1136 Anesthesia Stop: 1309    Procedures:       CREATION OF LEFT UPPER EXTREMITY DIALYSIS GRAFT (Left: Arm Upper)      REMOVAL OF PERITONEAL DIALYSIS CATHETER (Abdomen) Diagnosis: (END STAGE RENAL DISEASE)    Surgeons: Denis Brown M.D. Responsible Provider: Morgan Temple M.D.    Anesthesia Type: general ASA Status: 4            Final Anesthesia Type: general  Last vitals  BP   Blood Pressure : 126/60    Temp   36 °C (96.8 °F)    Pulse   76   Resp   13    SpO2   100 %      Anesthesia Post Evaluation    Patient location during evaluation: PACU  Patient participation: complete - patient participated  Level of consciousness: awake and alert  Pain score: 0    Airway patency: patent  Anesthetic complications: no  Cardiovascular status: hemodynamically stable  Respiratory status: acceptable  Hydration status: euvolemic    PONV: none          No notable events documented.     Nurse Pain Score: 0 (NPRS)

## 2024-10-03 ENCOUNTER — PATIENT MESSAGE (OUTPATIENT)
Dept: HEALTH INFORMATION MANAGEMENT | Facility: OTHER | Age: 69
End: 2024-10-03

## 2024-10-03 NOTE — DISCHARGE SUMMARY
Discharge Summary    CHIEF COMPLAINT ON ADMISSION  Chief Complaint   Patient presents with   • Chest Pain     sudden onset last night while sleeping, pain from R side, radiatin gto L side , neck and L arm   • Shortness of Breath     speaking in full sentences       Reason for Admission  Chest Pain, SOB      Admission Date  8/17/2020    CODE STATUS  Prior    HPI & HOSPITAL COURSE  This is a 65 y.o. female here with chest pain. Patient recently admitted and underwent left heart catheterization on 8/10/2020 with stent placed to the LAD.  She had a d-dimer that was mildly elevated, CT scan of chest was negative for pulmonary embolism.  She was discharged 7 days ago and was doing well until she had recurrence of the chest pain.  Troponin was only mildly elevated, EKG did not show any acute findings. Cardiology was consulted on the case.  Echocardiogram was done which showed ejection fraction of 65% with no changes from previous.  Cardiology recommended colchicine as needed for chest pain.  They have cleared patient for discharge.       Therefore, she is discharged in good and stable condition to home with close outpatient follow-up.    The patient recovered much more quickly than anticipated on admission.    Discharge Date  8/18/2020    FOLLOW UP ITEMS POST DISCHARGE  Follow-up with cardiology as scheduled    DISCHARGE DIAGNOSES  Active Problems:    Type 1 diabetes mellitus with neurological manifestations, uncontrolled (HCC) POA: Yes      Overview: ICD-10 transition    Hypertension POA: Yes    Lung nodule POA: Yes    CAD S/P percutaneous coronary angioplasty POA: Yes    Pain in the chest POA: Unknown    Hypothyroidism, postsurgical POA: Yes    Diabetic polyneuropathy (CMS-HCC) POA: Yes      Overview: ICD-10 transition    Tobacco abuse POA: Yes    Dyslipidemia POA: Yes  Resolved Problems:    * No resolved hospital problems. *      FOLLOW UP  Future Appointments   Date Time Provider Department Center   8/24/2020 11:30 AM  [facility] None   9/10/2020  3:00 PM [facility] None     No follow-up provider specified.    MEDICATIONS ON DISCHARGE     Medication List      START taking these medications      Instructions   colchicine 0.6 MG Tabs  Commonly known as: COLCRYS   Take 1 Tab by mouth 2 times a day as needed (chest pain).  Dose: 0.6 mg        CONTINUE taking these medications      Instructions   amLODIPine 10 MG Tabs  Commonly known as: NORVASC   Take 1 Tab by mouth every evening.  Dose: 10 mg     aspirin 81 MG EC tablet   Take 81 mg by mouth every morning.  Dose: 81 mg     atorvastatin 40 MG Tabs  Commonly known as: LIPITOR   Take 1 Tab by mouth every evening.  Dose: 40 mg     Brilinta 90 MG Tabs tablet  Generic drug: ticagrelor   Doctor's comments: Meds to beds please  Take 1 Tab by mouth 2 Times a Day.  Dose: 90 mg     levothyroxine 150 MCG Tabs  Commonly known as: SYNTHROID   Doctor's comments: MD indicated brand synthroid via tiger text  Take 1 Tab by mouth Every morning on an empty stomach.  Dose: 150 mcg     metoprolol 100 MG Tabs  Commonly known as: LOPRESSOR   Take 1 Tab by mouth 2 Times a Day.  Dose: 100 mg     NovoLOG 100 UNIT/ML Soln  Generic drug: insulin aspart   Inject 2-10 Units as instructed 3 times a day before meals. 1 units for every 5 g of carbs   AND  Sliding Scale   150 - 200 = 1 units  201 - 250 = 2 units  251 - 300 = 3 units  301 - 350 = 4 units  351 - 400 = 5 units  Dose: 2-10 Units     sucralfate 1 GM Tabs  Commonly known as: CARAFATE   Take 1 g by mouth 4 Times a Day,Before Meals and at Bedtime.  Dose: 1 g     traZODone 100 MG Tabs  Commonly known as: DESYREL   Take 100 mg by mouth at bedtime as needed for Sleep.  Dose: 100 mg     Tresiba 100 UNIT/ML Soln  Generic drug: Insulin Degludec   Inject 15 Units as instructed every evening.  Dose: 15 Units        STOP taking these medications    insulin glargine 100 UNIT/ML Soln  Commonly known as: Lantus             Allergies  Allergies   Allergen Reactions   • Ativan Unspecified      Extreme Restlessness with whole body  ALY=[id number]   • Tape      Blisters, paper tape is ok       DIET  No orders of the defined types were placed in this encounter.      ACTIVITY  As tolerated.  Weight bearing as tolerated    CONSULTATIONS  Cardiology    PROCEDURES  None    LABORATORY  Lab Results   Component Value Date    SODIUM 139 08/18/2020    POTASSIUM 3.9 08/18/2020    CHLORIDE 106 08/18/2020    CO2 21 08/18/2020    GLUCOSE 53 (L) 08/18/2020    BUN 20 08/18/2020    CREATININE 0.92 08/18/2020    CREATININE 1.0 01/08/2009        Lab Results   Component Value Date    WBC 7.3 08/18/2020    HEMOGLOBIN 10.9 (L) 08/18/2020    HEMATOCRIT 33.8 (L) 08/18/2020    PLATELETCT 280 08/18/2020        Total time of the discharge process exceeds 30 minutes.   normal

## 2024-10-09 ENCOUNTER — PATIENT OUTREACH (OUTPATIENT)
Dept: HEALTH INFORMATION MANAGEMENT | Facility: OTHER | Age: 69
End: 2024-10-09
Payer: MEDICARE

## 2024-10-09 ENCOUNTER — PATIENT MESSAGE (OUTPATIENT)
Dept: HEALTH INFORMATION MANAGEMENT | Facility: OTHER | Age: 69
End: 2024-10-09

## 2024-10-09 DIAGNOSIS — E78.5 DYSLIPIDEMIA: ICD-10-CM

## 2024-10-09 DIAGNOSIS — N18.6 ESRD NEEDING DIALYSIS (HCC): ICD-10-CM

## 2024-10-09 DIAGNOSIS — K21.9 GASTROESOPHAGEAL REFLUX DISEASE, UNSPECIFIED WHETHER ESOPHAGITIS PRESENT: ICD-10-CM

## 2024-10-09 DIAGNOSIS — R42 VERTIGO: ICD-10-CM

## 2024-10-09 DIAGNOSIS — M48.061 SPINAL STENOSIS OF LUMBAR REGION, UNSPECIFIED WHETHER NEUROGENIC CLAUDICATION PRESENT: ICD-10-CM

## 2024-10-09 DIAGNOSIS — F33.9 RECURRENT MAJOR DEPRESSIVE DISORDER, REMISSION STATUS UNSPECIFIED (HCC): ICD-10-CM

## 2024-10-09 DIAGNOSIS — E10.9 TYPE 1 DIABETES MELLITUS ON INSULIN THERAPY (HCC): ICD-10-CM

## 2024-10-09 DIAGNOSIS — F41.9 ANXIETY: ICD-10-CM

## 2024-10-09 DIAGNOSIS — M54.50 CHRONIC LOW BACK PAIN, UNSPECIFIED BACK PAIN LATERALITY, UNSPECIFIED WHETHER SCIATICA PRESENT: ICD-10-CM

## 2024-10-09 DIAGNOSIS — I10 PRIMARY HYPERTENSION: ICD-10-CM

## 2024-10-09 DIAGNOSIS — Z99.2 ESRD NEEDING DIALYSIS (HCC): ICD-10-CM

## 2024-10-09 DIAGNOSIS — G89.29 CHRONIC LOW BACK PAIN, UNSPECIFIED BACK PAIN LATERALITY, UNSPECIFIED WHETHER SCIATICA PRESENT: ICD-10-CM

## 2024-10-09 DIAGNOSIS — J43.1 PANLOBULAR EMPHYSEMA (HCC): ICD-10-CM

## 2024-10-10 ENCOUNTER — PATIENT MESSAGE (OUTPATIENT)
Dept: HEALTH INFORMATION MANAGEMENT | Facility: OTHER | Age: 69
End: 2024-10-10

## 2024-10-10 ENCOUNTER — APPOINTMENT (OUTPATIENT)
Dept: CARDIOLOGY | Facility: MEDICAL CENTER | Age: 69
End: 2024-10-10
Attending: NURSE PRACTITIONER
Payer: MEDICARE

## 2024-10-11 ENCOUNTER — PATIENT MESSAGE (OUTPATIENT)
Dept: HEALTH INFORMATION MANAGEMENT | Facility: OTHER | Age: 69
End: 2024-10-11

## 2024-10-13 SDOH — ECONOMIC STABILITY: FOOD INSECURITY: WITHIN THE PAST 12 MONTHS, YOU WORRIED THAT YOUR FOOD WOULD RUN OUT BEFORE YOU GOT MONEY TO BUY MORE.: SOMETIMES TRUE

## 2024-10-13 SDOH — ECONOMIC STABILITY: FOOD INSECURITY: WITHIN THE PAST 12 MONTHS, THE FOOD YOU BOUGHT JUST DIDN'T LAST AND YOU DIDN'T HAVE MONEY TO GET MORE.: NEVER TRUE

## 2024-10-13 SDOH — ECONOMIC STABILITY: FOOD INSECURITY: WITHIN THE PAST 12 MONTHS, THE FOOD YOU BOUGHT JUST DIDN'T LAST AND YOU DIDN'T HAVE MONEY TO GET MORE.: SOMETIMES TRUE

## 2024-10-13 SDOH — ECONOMIC STABILITY: HOUSING INSECURITY: IN THE PAST 12 MONTHS, HOW MANY TIMES HAVE YOU MOVED WHERE YOU WERE LIVING?: 0

## 2024-10-13 SDOH — HEALTH STABILITY: PHYSICAL HEALTH: ON AVERAGE, HOW MANY MINUTES DO YOU ENGAGE IN EXERCISE AT THIS LEVEL?: 0 MIN

## 2024-10-13 SDOH — HEALTH STABILITY: MENTAL HEALTH
STRESS IS WHEN SOMEONE FEELS TENSE, NERVOUS, ANXIOUS, OR CAN'T SLEEP AT NIGHT BECAUSE THEIR MIND IS TROUBLED. HOW STRESSED ARE YOU?: VERY MUCH

## 2024-10-13 SDOH — ECONOMIC STABILITY: INCOME INSECURITY: HOW HARD IS IT FOR YOU TO PAY FOR THE VERY BASICS LIKE FOOD, HOUSING, MEDICAL CARE, AND HEATING?: SOMEWHAT HARD

## 2024-10-13 SDOH — ECONOMIC STABILITY: INCOME INSECURITY: IN THE LAST 12 MONTHS, WAS THERE A TIME WHEN YOU WERE NOT ABLE TO PAY THE MORTGAGE OR RENT ON TIME?: NO

## 2024-10-13 SDOH — HEALTH STABILITY: PHYSICAL HEALTH: ON AVERAGE, HOW MANY DAYS PER WEEK DO YOU ENGAGE IN MODERATE TO STRENUOUS EXERCISE (LIKE A BRISK WALK)?: 0 DAYS

## 2024-10-13 SDOH — ECONOMIC STABILITY: HOUSING INSECURITY: AT ANY TIME IN THE PAST 12 MONTHS, WERE YOU HOMELESS OR LIVING IN A SHELTER (INCLUDING NOW)?: NO

## 2024-10-13 ASSESSMENT — LIFESTYLE VARIABLES
AUDIT-C TOTAL SCORE: 0
SKIP TO QUESTIONS 9-10: 1
HOW OFTEN DO YOU HAVE SIX OR MORE DRINKS ON ONE OCCASION: NEVER
HOW OFTEN DO YOU HAVE 6 OR MORE DRINKS ON ONE OCCASION: NEVER
HOW MANY STANDARD DRINKS CONTAINING ALCOHOL DO YOU HAVE ON A TYPICAL DAY: PATIENT DOES NOT DRINK
HOW OFTEN DO YOU HAVE A DRINK CONTAINING ALCOHOL: NEVER
HOW MANY STANDARD DRINKS CONTAINING ALCOHOL DO YOU HAVE ON A TYPICAL DAY: PATIENT DOES NOT DRINK
HOW OFTEN DO YOU HAVE A DRINK CONTAINING ALCOHOL: NEVER

## 2024-10-13 ASSESSMENT — SOCIAL DETERMINANTS OF HEALTH (SDOH)
IN A TYPICAL WEEK, HOW MANY TIMES DO YOU TALK ON THE PHONE WITH FAMILY, FRIENDS, OR NEIGHBORS?: MORE THAN THREE TIMES A WEEK
HOW OFTEN DO YOU ATTEND CHURCH OR RELIGIOUS SERVICES?: NEVER
DO YOU BELONG TO ANY CLUBS OR ORGANIZATIONS SUCH AS CHURCH GROUPS UNIONS, FRATERNAL OR ATHLETIC GROUPS, OR SCHOOL GROUPS?: NO
HOW OFTEN DO YOU ATTENT MEETINGS OF THE CLUB OR ORGANIZATION YOU BELONG TO?: NEVER
IN THE PAST 12 MONTHS, HAS THE ELECTRIC, GAS, OIL, OR WATER COMPANY THREATENED TO SHUT OFF SERVICE IN YOUR HOME?: NO
HOW OFTEN DO YOU ATTEND CHURCH OR RELIGIOUS SERVICES?: NEVER
IN THE PAST 12 MONTHS, HAS THE ELECTRIC, GAS, OIL, OR WATER COMPANY THREATENED TO SHUT OFF SERVICE IN YOUR HOME?: NO
IN A TYPICAL WEEK, HOW MANY TIMES DO YOU TALK ON THE PHONE WITH FAMILY, FRIENDS, OR NEIGHBORS?: MORE THAN THREE TIMES A WEEK
HOW OFTEN DO YOU ATTENT MEETINGS OF THE CLUB OR ORGANIZATION YOU BELONG TO?: NEVER
DO YOU BELONG TO ANY CLUBS OR ORGANIZATIONS SUCH AS CHURCH GROUPS UNIONS, FRATERNAL OR ATHLETIC GROUPS, OR SCHOOL GROUPS?: NO

## 2024-10-14 ENCOUNTER — OFFICE VISIT (OUTPATIENT)
Dept: URGENT CARE | Facility: PHYSICIAN GROUP | Age: 69
End: 2024-10-14
Payer: MEDICARE

## 2024-10-14 VITALS
HEART RATE: 76 BPM | HEIGHT: 66 IN | BODY MASS INDEX: 20.73 KG/M2 | OXYGEN SATURATION: 97 % | DIASTOLIC BLOOD PRESSURE: 62 MMHG | SYSTOLIC BLOOD PRESSURE: 136 MMHG | WEIGHT: 129 LBS | TEMPERATURE: 98.4 F | RESPIRATION RATE: 18 BRPM

## 2024-10-14 DIAGNOSIS — R05.1 ACUTE COUGH: ICD-10-CM

## 2024-10-14 DIAGNOSIS — J06.9 VIRAL URI: ICD-10-CM

## 2024-10-14 LAB
FLUAV RNA SPEC QL NAA+PROBE: NEGATIVE
FLUBV RNA SPEC QL NAA+PROBE: NEGATIVE
RSV RNA SPEC QL NAA+PROBE: NEGATIVE
SARS-COV-2 RNA RESP QL NAA+PROBE: NEGATIVE

## 2024-10-14 PROCEDURE — 3075F SYST BP GE 130 - 139MM HG: CPT | Performed by: PHYSICIAN ASSISTANT

## 2024-10-14 PROCEDURE — 3078F DIAST BP <80 MM HG: CPT | Performed by: PHYSICIAN ASSISTANT

## 2024-10-14 PROCEDURE — 99213 OFFICE O/P EST LOW 20 MIN: CPT | Performed by: PHYSICIAN ASSISTANT

## 2024-10-14 PROCEDURE — 0241U POCT CEPHEID COV-2, FLU A/B, RSV - PCR: CPT | Performed by: PHYSICIAN ASSISTANT

## 2024-10-14 ASSESSMENT — ENCOUNTER SYMPTOMS
SORE THROAT: 1
VOMITING: 0
ABDOMINAL PAIN: 0
MYALGIAS: 1
CHILLS: 1
WHEEZING: 0
NAUSEA: 0
SHORTNESS OF BREATH: 0
FEVER: 1
COUGH: 1
DIARRHEA: 0
HEADACHES: 1

## 2024-10-14 ASSESSMENT — FIBROSIS 4 INDEX: FIB4 SCORE: 4.41

## 2024-10-15 ENCOUNTER — OFFICE VISIT (OUTPATIENT)
Dept: VASCULAR SURGERY | Facility: MEDICAL CENTER | Age: 69
End: 2024-10-15
Payer: MEDICARE

## 2024-10-15 VITALS
DIASTOLIC BLOOD PRESSURE: 58 MMHG | WEIGHT: 125 LBS | TEMPERATURE: 98.2 F | HEART RATE: 68 BPM | BODY MASS INDEX: 20.09 KG/M2 | SYSTOLIC BLOOD PRESSURE: 116 MMHG | OXYGEN SATURATION: 95 % | HEIGHT: 66 IN

## 2024-10-15 DIAGNOSIS — Z99.2 END STAGE RENAL DISEASE ON DIALYSIS (HCC): ICD-10-CM

## 2024-10-15 DIAGNOSIS — N18.6 END STAGE RENAL DISEASE ON DIALYSIS (HCC): ICD-10-CM

## 2024-10-15 PROCEDURE — 99024 POSTOP FOLLOW-UP VISIT: CPT | Performed by: PHYSICIAN ASSISTANT

## 2024-10-15 PROCEDURE — 3078F DIAST BP <80 MM HG: CPT | Performed by: PHYSICIAN ASSISTANT

## 2024-10-15 PROCEDURE — 3074F SYST BP LT 130 MM HG: CPT | Performed by: PHYSICIAN ASSISTANT

## 2024-10-15 ASSESSMENT — FIBROSIS 4 INDEX: FIB4 SCORE: 4.41

## 2024-10-16 RX ORDER — URSODIOL 300 MG/1
1 CAPSULE ORAL 3 TIMES DAILY
COMMUNITY

## 2024-10-16 SDOH — SOCIAL STABILITY: SOCIAL NETWORK: HOW OFTEN DO YOU ATTEND CHURCH OR RELIGIOUS SERVICES?: NEVER

## 2024-10-16 SDOH — HEALTH STABILITY: MENTAL HEALTH: HOW MANY STANDARD DRINKS CONTAINING ALCOHOL DO YOU HAVE ON A TYPICAL DAY?: PATIENT DOES NOT DRINK

## 2024-10-16 SDOH — HEALTH STABILITY: MENTAL HEALTH: HOW OFTEN DO YOU HAVE 6 OR MORE DRINKS ON ONE OCCASION?: NEVER

## 2024-10-16 SDOH — ECONOMIC STABILITY: INCOME INSECURITY: IN THE PAST 12 MONTHS, HAS THE ELECTRIC, GAS, OIL, OR WATER COMPANY THREATENED TO SHUT OFF SERVICE IN YOUR HOME?: NO

## 2024-10-16 SDOH — HEALTH STABILITY: PHYSICAL HEALTH: ON AVERAGE, HOW MANY DAYS PER WEEK DO YOU ENGAGE IN MODERATE TO STRENUOUS EXERCISE (LIKE A BRISK WALK)?: 0 DAYS

## 2024-10-16 SDOH — SOCIAL STABILITY: SOCIAL INSECURITY: WITHIN THE LAST YEAR, HAVE YOU BEEN AFRAID OF YOUR PARTNER OR EX-PARTNER?: NO

## 2024-10-16 SDOH — SOCIAL STABILITY: SOCIAL NETWORK: ARE YOU MARRIED, WIDOWED, DIVORCED, SEPARATED, NEVER MARRIED, OR LIVING WITH A PARTNER?: WIDOWED

## 2024-10-16 SDOH — SOCIAL STABILITY: SOCIAL INSECURITY: WITHIN THE LAST YEAR, HAVE YOU BEEN HUMILIATED OR EMOTIONALLY ABUSED IN OTHER WAYS BY YOUR PARTNER OR EX-PARTNER?: NO

## 2024-10-16 SDOH — HEALTH STABILITY: MENTAL HEALTH
HOW OFTEN DO YOU NEED TO HAVE SOMEONE HELP YOU WHEN YOU READ INSTRUCTIONS, PAMPHLETS, OR OTHER WRITTEN MATERIAL FROM YOUR DOCTOR OR PHARMACY?: SOMETIMES

## 2024-10-16 SDOH — ECONOMIC STABILITY: INCOME INSECURITY: IN THE LAST 12 MONTHS, WAS THERE A TIME WHEN YOU WERE NOT ABLE TO PAY THE MORTGAGE OR RENT ON TIME?: NO

## 2024-10-16 SDOH — ECONOMIC STABILITY: FOOD INSECURITY: WITHIN THE PAST 12 MONTHS, YOU WORRIED THAT YOUR FOOD WOULD RUN OUT BEFORE YOU GOT MONEY TO BUY MORE.: SOMETIMES TRUE

## 2024-10-16 SDOH — SOCIAL STABILITY: SOCIAL NETWORK: HOW OFTEN DO YOU ATTENT MEETINGS OF THE CLUB OR ORGANIZATION YOU BELONG TO?: NEVER

## 2024-10-16 SDOH — ECONOMIC STABILITY: FOOD INSECURITY: WITHIN THE PAST 12 MONTHS, THE FOOD YOU BOUGHT JUST DIDN'T LAST AND YOU DIDN'T HAVE MONEY TO GET MORE.: NEVER TRUE

## 2024-10-16 SDOH — ECONOMIC STABILITY: HOUSING INSECURITY: AT ANY TIME IN THE PAST 12 MONTHS, WERE YOU HOMELESS OR LIVING IN A SHELTER (INCLUDING NOW)?: NO

## 2024-10-16 SDOH — HEALTH STABILITY: MENTAL HEALTH: HOW OFTEN DO YOU HAVE A DRINK CONTAINING ALCOHOL?: NEVER

## 2024-10-16 SDOH — ECONOMIC STABILITY: INCOME INSECURITY: HOW HARD IS IT FOR YOU TO PAY FOR THE VERY BASICS LIKE FOOD, HOUSING, MEDICAL CARE, AND HEATING?: SOMEWHAT HARD

## 2024-10-16 SDOH — ECONOMIC STABILITY: HOUSING INSECURITY: IN THE PAST 12 MONTHS, HOW MANY TIMES HAVE YOU MOVED WHERE YOU WERE LIVING?: 0

## 2024-10-16 SDOH — SOCIAL STABILITY: SOCIAL NETWORK: IN A TYPICAL WEEK, HOW MANY TIMES DO YOU TALK ON THE PHONE WITH FAMILY, FRIENDS, OR NEIGHBORS?: THREE TIMES A WEEK

## 2024-10-16 SDOH — HEALTH STABILITY: PHYSICAL HEALTH: ON AVERAGE, HOW MANY MINUTES DO YOU ENGAGE IN EXERCISE AT THIS LEVEL?: 0 MIN

## 2024-10-16 SDOH — SOCIAL STABILITY: SOCIAL NETWORK: HOW OFTEN DO YOU GET TOGETHER WITH FRIENDS OR RELATIVES?: MORE THAN THREE TIMES A WEEK

## 2024-10-16 ASSESSMENT — PATIENT HEALTH QUESTIONNAIRE - PHQ9
CLINICAL INTERPRETATION OF PHQ2 SCORE: 2
5. POOR APPETITE OR OVEREATING: 3 - NEARLY EVERY DAY
SUM OF ALL RESPONSES TO PHQ QUESTIONS 1-9: 14

## 2024-10-16 ASSESSMENT — LIFESTYLE VARIABLES
AUDIT-C TOTAL SCORE: 0
SKIP TO QUESTIONS 9-10: 1

## 2024-10-18 ENCOUNTER — HOSPITAL ENCOUNTER (OUTPATIENT)
Dept: LAB | Facility: MEDICAL CENTER | Age: 69
End: 2024-10-18
Attending: NURSE PRACTITIONER
Payer: MEDICARE

## 2024-10-18 LAB
BASOPHILS # BLD AUTO: 1.7 % (ref 0–1.8)
BASOPHILS # BLD: 0.13 K/UL (ref 0–0.12)
EOSINOPHIL # BLD AUTO: 0.46 K/UL (ref 0–0.51)
EOSINOPHIL NFR BLD: 5.9 % (ref 0–6.9)
ERYTHROCYTE [DISTWIDTH] IN BLOOD BY AUTOMATED COUNT: 51.6 FL (ref 35.9–50)
EST. AVERAGE GLUCOSE BLD GHB EST-MCNC: 258 MG/DL
HBA1C MFR BLD: 10.6 % (ref 4–5.6)
HCT VFR BLD AUTO: 41.3 % (ref 37–47)
HGB BLD-MCNC: 13.5 G/DL (ref 12–16)
IMM GRANULOCYTES # BLD AUTO: 0.03 K/UL (ref 0–0.11)
IMM GRANULOCYTES NFR BLD AUTO: 0.4 % (ref 0–0.9)
LYMPHOCYTES # BLD AUTO: 1.27 K/UL (ref 1–4.8)
LYMPHOCYTES NFR BLD: 16.4 % (ref 22–41)
MCH RBC QN AUTO: 31 PG (ref 27–33)
MCHC RBC AUTO-ENTMCNC: 32.7 G/DL (ref 32.2–35.5)
MCV RBC AUTO: 94.7 FL (ref 81.4–97.8)
MONOCYTES # BLD AUTO: 0.63 K/UL (ref 0–0.85)
MONOCYTES NFR BLD AUTO: 8.1 % (ref 0–13.4)
NEUTROPHILS # BLD AUTO: 5.23 K/UL (ref 1.82–7.42)
NEUTROPHILS NFR BLD: 67.5 % (ref 44–72)
NRBC # BLD AUTO: 0 K/UL
NRBC BLD-RTO: 0 /100 WBC (ref 0–0.2)
PLATELET # BLD AUTO: 229 K/UL (ref 164–446)
PMV BLD AUTO: 10.7 FL (ref 9–12.9)
RBC # BLD AUTO: 4.36 M/UL (ref 4.2–5.4)
TRANSFERRIN SERPL-MCNC: 204 MG/DL (ref 200–370)
WBC # BLD AUTO: 7.8 K/UL (ref 4.8–10.8)

## 2024-10-18 PROCEDURE — 82306 VITAMIN D 25 HYDROXY: CPT

## 2024-10-18 PROCEDURE — 82746 ASSAY OF FOLIC ACID SERUM: CPT

## 2024-10-18 PROCEDURE — 36415 COLL VENOUS BLD VENIPUNCTURE: CPT

## 2024-10-18 PROCEDURE — 82607 VITAMIN B-12: CPT

## 2024-10-18 PROCEDURE — 84443 ASSAY THYROID STIM HORMONE: CPT

## 2024-10-18 PROCEDURE — 84466 ASSAY OF TRANSFERRIN: CPT

## 2024-10-18 PROCEDURE — 83540 ASSAY OF IRON: CPT

## 2024-10-18 PROCEDURE — 84100 ASSAY OF PHOSPHORUS: CPT

## 2024-10-18 PROCEDURE — 85025 COMPLETE CBC W/AUTO DIFF WBC: CPT

## 2024-10-18 PROCEDURE — 83036 HEMOGLOBIN GLYCOSYLATED A1C: CPT

## 2024-10-18 PROCEDURE — 80061 LIPID PANEL: CPT

## 2024-10-18 PROCEDURE — 84550 ASSAY OF BLOOD/URIC ACID: CPT

## 2024-10-18 PROCEDURE — 80053 COMPREHEN METABOLIC PANEL: CPT

## 2024-10-18 PROCEDURE — 83550 IRON BINDING TEST: CPT

## 2024-10-18 PROCEDURE — 83735 ASSAY OF MAGNESIUM: CPT

## 2024-10-18 PROCEDURE — 84425 ASSAY OF VITAMIN B-1: CPT

## 2024-10-18 PROCEDURE — 82728 ASSAY OF FERRITIN: CPT

## 2024-10-18 PROCEDURE — 84481 FREE ASSAY (FT-3): CPT

## 2024-10-18 PROCEDURE — 84439 ASSAY OF FREE THYROXINE: CPT

## 2024-10-18 PROCEDURE — 83970 ASSAY OF PARATHORMONE: CPT

## 2024-10-19 LAB
25(OH)D3 SERPL-MCNC: 51 NG/ML (ref 30–100)
ALBUMIN SERPL BCP-MCNC: 3.7 G/DL (ref 3.2–4.9)
ALBUMIN/GLOB SERPL: 0.8 G/DL
ALP SERPL-CCNC: 1418 U/L (ref 30–99)
ALT SERPL-CCNC: 93 U/L (ref 2–50)
ANION GAP SERPL CALC-SCNC: 12 MMOL/L (ref 7–16)
AST SERPL-CCNC: 113 U/L (ref 12–45)
BILIRUB SERPL-MCNC: 0.7 MG/DL (ref 0.1–1.5)
BUN SERPL-MCNC: 17 MG/DL (ref 8–22)
CALCIUM ALBUM COR SERPL-MCNC: 9.8 MG/DL (ref 8.5–10.5)
CALCIUM SERPL-MCNC: 9.6 MG/DL (ref 8.5–10.5)
CHLORIDE SERPL-SCNC: 94 MMOL/L (ref 96–112)
CHOLEST SERPL-MCNC: 263 MG/DL (ref 100–199)
CO2 SERPL-SCNC: 27 MMOL/L (ref 20–33)
CREAT SERPL-MCNC: 2.58 MG/DL (ref 0.5–1.4)
FERRITIN SERPL-MCNC: 1050 NG/ML (ref 10–291)
FOLATE SERPL-MCNC: 6.4 NG/ML
GFR SERPLBLD CREATININE-BSD FMLA CKD-EPI: 20 ML/MIN/1.73 M 2
GLOBULIN SER CALC-MCNC: 4.8 G/DL (ref 1.9–3.5)
GLUCOSE SERPL-MCNC: 97 MG/DL (ref 65–99)
HDLC SERPL-MCNC: 143 MG/DL
IRON SATN MFR SERPL: 22 % (ref 15–55)
IRON SERPL-MCNC: 54 UG/DL (ref 40–170)
LDLC SERPL CALC-MCNC: 103 MG/DL
MAGNESIUM SERPL-MCNC: 2.1 MG/DL (ref 1.5–2.5)
PHOSPHATE SERPL-MCNC: 3.2 MG/DL (ref 2.5–4.5)
POTASSIUM SERPL-SCNC: 4.1 MMOL/L (ref 3.6–5.5)
PROT SERPL-MCNC: 8.5 G/DL (ref 6–8.2)
PTH-INTACT SERPL-MCNC: 44 PG/ML (ref 14–72)
SODIUM SERPL-SCNC: 133 MMOL/L (ref 135–145)
T3FREE SERPL-MCNC: 2.2 PG/ML (ref 2–4.4)
TIBC SERPL-MCNC: 248 UG/DL (ref 250–450)
TRIGL SERPL-MCNC: 87 MG/DL (ref 0–149)
TSH SERPL-ACNC: 4.23 UIU/ML (ref 0.35–5.5)
UIBC SERPL-MCNC: 194 UG/DL (ref 110–370)
URATE SERPL-MCNC: 2.9 MG/DL (ref 1.9–8.2)
VIT B12 SERPL-MCNC: 997 PG/ML (ref 211–911)

## 2024-10-21 ENCOUNTER — HOSPITAL ENCOUNTER (OUTPATIENT)
Dept: RADIOLOGY | Facility: MEDICAL CENTER | Age: 69
End: 2024-10-21
Attending: NURSE PRACTITIONER
Payer: MEDICARE

## 2024-10-21 ENCOUNTER — TELEPHONE (OUTPATIENT)
Dept: ENDOCRINOLOGY | Facility: MEDICAL CENTER | Age: 69
End: 2024-10-21
Payer: MEDICARE

## 2024-10-21 DIAGNOSIS — R42 DIZZINESS AND GIDDINESS: ICD-10-CM

## 2024-10-21 DIAGNOSIS — R55 SYNCOPE AND COLLAPSE: ICD-10-CM

## 2024-10-21 PROCEDURE — 93880 EXTRACRANIAL BILAT STUDY: CPT

## 2024-10-21 PROCEDURE — 93880 EXTRACRANIAL BILAT STUDY: CPT | Mod: 26 | Performed by: INTERNAL MEDICINE

## 2024-10-22 ENCOUNTER — OFFICE VISIT (OUTPATIENT)
Dept: VASCULAR SURGERY | Facility: MEDICAL CENTER | Age: 69
End: 2024-10-22
Payer: MEDICARE

## 2024-10-22 ENCOUNTER — HOSPITAL ENCOUNTER (OUTPATIENT)
Dept: RADIOLOGY | Facility: MEDICAL CENTER | Age: 69
End: 2024-10-22
Payer: MEDICARE

## 2024-10-22 VITALS
OXYGEN SATURATION: 97 % | TEMPERATURE: 97.2 F | WEIGHT: 125 LBS | HEIGHT: 64 IN | HEART RATE: 70 BPM | BODY MASS INDEX: 21.34 KG/M2

## 2024-10-22 DIAGNOSIS — K74.3 PRIMARY BILIARY CHOLANGITIS (HCC): ICD-10-CM

## 2024-10-22 DIAGNOSIS — N18.6 ESRD (END STAGE RENAL DISEASE) (HCC): ICD-10-CM

## 2024-10-22 PROCEDURE — 99024 POSTOP FOLLOW-UP VISIT: CPT | Performed by: SURGERY

## 2024-10-22 PROCEDURE — 77080 DXA BONE DENSITY AXIAL: CPT

## 2024-10-22 ASSESSMENT — FIBROSIS 4 INDEX: FIB4 SCORE: 3.43

## 2024-10-23 ENCOUNTER — ANCILLARY PROCEDURE (OUTPATIENT)
Dept: CARDIOLOGY | Facility: MEDICAL CENTER | Age: 69
End: 2024-10-23
Attending: NURSE PRACTITIONER
Payer: MEDICARE

## 2024-10-23 DIAGNOSIS — R42 DIZZINESS AND GIDDINESS: ICD-10-CM

## 2024-10-23 DIAGNOSIS — R55 SYNCOPE AND COLLAPSE: ICD-10-CM

## 2024-10-23 LAB
LV EJECT FRACT MOD 2C 99903: 66.59
LV EJECT FRACT MOD 4C 99902: 63.89
LV EJECT FRACT MOD BP 99901: 66.86
T4 FREE SERPL DIALY-MCNC: 4 NG/DL (ref 1.1–2.4)
VIT B1 BLD-MCNC: 138 NMOL/L (ref 70–180)

## 2024-10-23 PROCEDURE — 93306 TTE W/DOPPLER COMPLETE: CPT

## 2024-10-23 PROCEDURE — 93306 TTE W/DOPPLER COMPLETE: CPT | Mod: 26 | Performed by: STUDENT IN AN ORGANIZED HEALTH CARE EDUCATION/TRAINING PROGRAM

## 2024-10-24 ENCOUNTER — OFFICE VISIT (OUTPATIENT)
Dept: ENDOCRINOLOGY | Facility: MEDICAL CENTER | Age: 69
End: 2024-10-24
Attending: INTERNAL MEDICINE
Payer: MEDICARE

## 2024-10-24 VITALS
OXYGEN SATURATION: 97 % | BODY MASS INDEX: 20.73 KG/M2 | DIASTOLIC BLOOD PRESSURE: 82 MMHG | WEIGHT: 129 LBS | HEIGHT: 66 IN | SYSTOLIC BLOOD PRESSURE: 140 MMHG | HEART RATE: 93 BPM

## 2024-10-24 DIAGNOSIS — M80.00XD AGE-RELATED OSTEOPOROSIS WITH CURRENT PATHOLOGICAL FRACTURE WITH ROUTINE HEALING: ICD-10-CM

## 2024-10-24 DIAGNOSIS — E55.9 VITAMIN D DEFICIENCY: ICD-10-CM

## 2024-10-24 DIAGNOSIS — Z91.148 HISTORY OF MEDICATION NONCOMPLIANCE: ICD-10-CM

## 2024-10-24 DIAGNOSIS — E10.9 TYPE 1 DIABETES MELLITUS ON INSULIN THERAPY (HCC): ICD-10-CM

## 2024-10-24 DIAGNOSIS — E78.5 DYSLIPIDEMIA: ICD-10-CM

## 2024-10-24 DIAGNOSIS — Z79.4 LONG-TERM INSULIN USE (HCC): ICD-10-CM

## 2024-10-24 DIAGNOSIS — E89.0 HYPOTHYROIDISM, POSTSURGICAL: ICD-10-CM

## 2024-10-24 PROCEDURE — 95250 CONT GLUC MNTR PHYS/QHP EQP: CPT | Performed by: INTERNAL MEDICINE

## 2024-10-24 PROCEDURE — 99212 OFFICE O/P EST SF 10 MIN: CPT | Performed by: INTERNAL MEDICINE

## 2024-10-24 ASSESSMENT — FIBROSIS 4 INDEX: FIB4 SCORE: 3.43

## 2024-10-31 ENCOUNTER — TELEPHONE (OUTPATIENT)
Dept: ENDOCRINOLOGY | Facility: MEDICAL CENTER | Age: 69
End: 2024-10-31
Payer: MEDICARE

## 2024-10-31 DIAGNOSIS — E10.9 TYPE 1 DIABETES MELLITUS ON INSULIN THERAPY (HCC): ICD-10-CM

## 2024-10-31 RX ORDER — BLOOD-GLUCOSE SENSOR
1 EACH MISCELLANEOUS
Qty: 2 EACH | Refills: 11 | Status: SHIPPED | OUTPATIENT
Start: 2024-10-31

## 2024-11-07 ENCOUNTER — HOSPITAL ENCOUNTER (OUTPATIENT)
Dept: RADIOLOGY | Facility: MEDICAL CENTER | Age: 69
End: 2024-11-07
Payer: MEDICARE

## 2024-11-07 ENCOUNTER — HOSPITAL ENCOUNTER (OUTPATIENT)
Dept: LAB | Facility: MEDICAL CENTER | Age: 69
End: 2024-11-07
Payer: MEDICARE

## 2024-11-07 LAB
ALBUMIN SERPL BCP-MCNC: 3.9 G/DL (ref 3.2–4.9)
ALBUMIN/GLOB SERPL: 0.7 G/DL
ALP SERPL-CCNC: 807 U/L (ref 30–99)
ALT SERPL-CCNC: 79 U/L (ref 2–50)
ANION GAP SERPL CALC-SCNC: 16 MMOL/L (ref 7–16)
AST SERPL-CCNC: 94 U/L (ref 12–45)
BILIRUB SERPL-MCNC: 0.8 MG/DL (ref 0.1–1.5)
BUN SERPL-MCNC: 29 MG/DL (ref 8–22)
CALCIUM ALBUM COR SERPL-MCNC: 9.8 MG/DL (ref 8.5–10.5)
CALCIUM SERPL-MCNC: 9.7 MG/DL (ref 8.5–10.5)
CHLORIDE SERPL-SCNC: 92 MMOL/L (ref 96–112)
CO2 SERPL-SCNC: 27 MMOL/L (ref 20–33)
CREAT SERPL-MCNC: 3.36 MG/DL (ref 0.5–1.4)
GFR SERPLBLD CREATININE-BSD FMLA CKD-EPI: 14 ML/MIN/1.73 M 2
GLOBULIN SER CALC-MCNC: 5.4 G/DL (ref 1.9–3.5)
GLUCOSE SERPL-MCNC: 157 MG/DL (ref 65–99)
POTASSIUM SERPL-SCNC: 5 MMOL/L (ref 3.6–5.5)
PROT SERPL-MCNC: 9.3 G/DL (ref 6–8.2)
SODIUM SERPL-SCNC: 135 MMOL/L (ref 135–145)

## 2024-11-07 PROCEDURE — 76700 US EXAM ABDOM COMPLETE: CPT

## 2024-11-07 PROCEDURE — 80053 COMPREHEN METABOLIC PANEL: CPT

## 2024-11-07 PROCEDURE — 36415 COLL VENOUS BLD VENIPUNCTURE: CPT

## 2024-11-08 ENCOUNTER — TELEPHONE (OUTPATIENT)
Dept: ONCOLOGY | Facility: MEDICAL CENTER | Age: 69
End: 2024-11-08
Payer: MEDICARE

## 2024-11-09 ENCOUNTER — TELEPHONE (OUTPATIENT)
Dept: ONCOLOGY | Facility: MEDICAL CENTER | Age: 69
End: 2024-11-09
Payer: MEDICARE

## 2024-11-09 NOTE — TELEPHONE ENCOUNTER
Called pt and lvm to reschedule missed prolia appointment on 11/08/24.  Asked pt to call if she would like to reschedule this appt

## 2024-11-14 ENCOUNTER — OFFICE VISIT (OUTPATIENT)
Dept: NEUROLOGY | Facility: MEDICAL CENTER | Age: 69
End: 2024-11-14
Attending: PSYCHIATRY & NEUROLOGY
Payer: MEDICARE

## 2024-11-14 VITALS
TEMPERATURE: 97.8 F | DIASTOLIC BLOOD PRESSURE: 60 MMHG | BODY MASS INDEX: 20.87 KG/M2 | HEART RATE: 80 BPM | WEIGHT: 129.85 LBS | OXYGEN SATURATION: 98 % | HEIGHT: 66 IN | SYSTOLIC BLOOD PRESSURE: 126 MMHG

## 2024-11-14 DIAGNOSIS — G25.81 RESTLESS LEG SYNDROME: ICD-10-CM

## 2024-11-14 DIAGNOSIS — F33.2 SEVERE EPISODE OF RECURRENT MAJOR DEPRESSIVE DISORDER, WITHOUT PSYCHOTIC FEATURES (HCC): ICD-10-CM

## 2024-11-14 PROCEDURE — 99212 OFFICE O/P EST SF 10 MIN: CPT | Performed by: PSYCHIATRY & NEUROLOGY

## 2024-11-14 PROCEDURE — 3078F DIAST BP <80 MM HG: CPT | Performed by: PSYCHIATRY & NEUROLOGY

## 2024-11-14 PROCEDURE — 99214 OFFICE O/P EST MOD 30 MIN: CPT | Performed by: PSYCHIATRY & NEUROLOGY

## 2024-11-14 PROCEDURE — 3074F SYST BP LT 130 MM HG: CPT | Performed by: PSYCHIATRY & NEUROLOGY

## 2024-11-14 RX ORDER — GABAPENTIN 100 MG/1
100 CAPSULE ORAL
Qty: 30 CAPSULE | Refills: 0 | Status: SHIPPED | OUTPATIENT
Start: 2024-11-15

## 2024-11-14 ASSESSMENT — PATIENT HEALTH QUESTIONNAIRE - PHQ9
SUM OF ALL RESPONSES TO PHQ QUESTIONS 1-9: 21
5. POOR APPETITE OR OVEREATING: 3 - NEARLY EVERY DAY
CLINICAL INTERPRETATION OF PHQ2 SCORE: 4

## 2024-11-14 ASSESSMENT — FIBROSIS 4 INDEX: FIB4 SCORE: 3.09

## 2024-11-14 NOTE — PROGRESS NOTES
"[facility] NEUROLOGY CLINIC    Date of Service: 11/14/24    Referred by: ZOË Maxwell  [address]  [place] [address]      Reason for referral      History of present illness (HPI)   Anu Manuel is a 69 y.o. female with multiple medical co-morbidities, notably uncontrolled type 1 diabetes with chronic use of insulin and complicated by polyneuropathy, ESRD on dialysis, autoimmune liver cirrhosis, and several spinal surgeries (cervical fusion in 200[address], lumbar laminectomy and diskectomy in 2016, thoracic laminectomy in 2018), placement of a spinal cord stimulator in 2018, thoracic wound irrigation and debridement in 2020, fibromyalgia with chronic back pain, right hip fracture in July if this year, and depression. She is referred by her PCP for the above.     She mentions that she is here for management of restless legs syndrome, which she has had since she was a child. Hot baths do not help anymore. It's gottne to the point that she has so much urge to move her legs, that she has to leave dialysis earlier. The restlessness happens in spurs and sometimes lasts for 3-4 days. It's usually worse when is on bed trying to sleep. She has tried gabapentin in the past, and thinks it might have worked for her.     She is very depressed and states she has been like that since her  passed away in 2020. She has no suicide ideation. Her psychiatrist left the practice and she is looking to establish care with a new one.       Review of Systems (ROS)  Negative for: Fever, chills, chest pain, shortness of breath, cough, diarrhea, constipation, urinary frequency, dysuria, skin rash, swelling.  Positive for: Depression, left arm pain, unhealed ulcers in left hand, back pain, fatigue.        Medical history  Past Medical History:   Diagnosis Date    Accidental drug overdose 08/27/2012    Adverse effect of anesthesia     in 2008 \"throat closes up\"\"anxiety\" ?laryngospasm, kept in ICU. Pt states no " "problems currently 2018.     Anemia     Anesthesia     in 2008 \"throat closes up\"\"anxiety\" ?laryngospasm, kept in ICU. Pt states no problems currently 2018.     Arthritis     right shoulder, hands    Bowel habit changes More frequent    Broken hip ([facility]) 04/2024    Broken hip possibly the pelvis.  Inoperable    Cataract 2022    Cigarette smoker quit 2013    Dental disorder     upper denture    depression 08/30/2016    denies depression, states has anxiety and panic attacks    Diabetes mellitus type 1 ([facility]) 1989    insulin    Dialysis patient ([facility]) Short time due to a kidney injury from a infection    Emphysema of lung (HCC)     COPD    Encounter for long-term (current) use of insulin ([facility]) 09/25/2013    GI bleed     Heart burn     High cholesterol     Hypertension     Hypothyroidism, postsurgical 1970    Indigestion     Infectious disease      had hepatitis C, tested neg.    Jaundice 2021 Liver disease    Joint replacement     cervical    Liver disease     Liver failure (HCC)     Myocardial infarct ([facility])     2019    Pain     \"fibromyalgia\";lower back, right leg    Polyneuropathy in diabetes(357.2) 06/10/2015    Renal disorder     Status post appendectomy     Type I (juvenile type) diabetes mellitus with neurological manifestations, uncontrolled(250.63) 06/10/2015    Vertigo          Surgical history  Past Surgical History:   Procedure Laterality Date    PB REMOVE PERM CANNULA/CATHETER  9/23/2024    Procedure: REMOVAL OF PERITONEAL DIALYSIS CATHETER;  Surgeon: Denis Brown M.D.;  Location: SURGERY [facility];  Service: General    AV FISTULA CREATION Left 9/23/2024    Procedure: CREATION OF LEFT UPPER EXTREMITY DIALYSIS GRAFT;  Surgeon: Denis Brown M.D.;  Location: SURGERY [facility];  Service: General    PB LAP INSERT INTRAPERITONEAL CATHETER  06/20/2024    Procedure: LAPAROSCOPIC INSERTION OF PERITONEAL DIALYSIS CATHETER;  Surgeon: Denis Brown M.D.;  Location: SURGERY [facility];  Service: " General    SD ERCP,DIAGNOSTIC N/A 01/14/2022    Procedure: ERCP, DIAGNOSTIC - WITH SIGMOID COLON BIOPSY AND STENT REMOVAL;  Surgeon: Cr Haro M.D.;  Location: [facility];  Service: Gastroenterology    THADDEUS BY LAPAROSCOPY N/A 10/28/2021    Procedure: CHOLECYSTECTOMY, LAPAROSCOPIC;  Surgeon: Tello Trammell M.D.;  Location: [facility];  Service: General    SD ERCP,DIAGNOSTIC N/A 10/26/2021    Procedure: ERCP (ENDOSCOPIC RETROGRADE CHOLANGIOPANCREATOGRAPHY);  Surgeon: Cr Haro M.D.;  Location: SURGERY SAME DAY [facility];  Service: Gastroenterology    SD UPPER GI ENDOSCOPY,BIOPSY N/A 10/26/2021    Procedure: GASTROSCOPY, WITH BIOPSY;  Surgeon: Cr Haro M.D.;  Location: SURGERY SAME DAY [facility];  Service: Gastroenterology    SD UPPER GI ENDOSCOPY,DIAGNOSIS N/A 10/26/2021    Procedure: GASTROSCOPY;  Surgeon: Cr Haro M.D.;  Location: SURGERY SAME DAY [facility];  Service: Gastroenterology    CATH PLACEMENT  01/25/2020    Procedure: INSERTION, CATHETER PERM;  Surgeon: Rola Mendoza M.D.;  Location: SURGERY [facility];  Service: General    IRRIGATION & DEBRIDEMENT NEURO  01/19/2020    Procedure: IRRIGATION AND DEBRIDEMENT, WOUND THORACIC AND LUMBAR;  Surgeon: Ryan Roman M.D.;  Location: [facility];  Service: Neurosurgery    SD INS/RPLC SPI NPG/RCVR POCKET N/A 12/16/2019    Procedure: EXPLORATION AT THORACIC 8 - 9, REPLACEMENT OF  NEUROSTIMULATOR LEAD;  Surgeon: Ryan Roman M.D.;  Location: SURGERY [facility];  Service: Neurosurgery    SPINAL CORD STIMULATOR N/A 10/26/2018    Procedure: SPINAL CORD STIMULATOR;  Surgeon: Ryan Roman M.D.;  Location: SURGERY [facility];  Service: Neurosurgery    THORACIC LAMINECTOMY N/A 10/26/2018    Procedure: THORACIC LAMINECTOMY - FOR;  Surgeon: Ryan Roman M.D.;  Location: SURGERY [facility];  Service: Neurosurgery    SD NJX AA&/STRD TFRML EPI LUMBAR/SACRAL 1 LEVEL Right 08/31/2016    Procedure:  INJ-FORAMEN EPI LUM/SAC SNGL L4-5;  Surgeon: Sukhi Godfrey M.D.;  Location: SURGERY [facility];  Service: Pain Management    LUMBAR LAMINECTOMY DISKECTOMY Right 05/10/2016    Procedure: LUMBAR L4-5 HEMILAMINECTOMY DISKECTOMY ;  Surgeon: Arnold Keyes M.D.;  Location: SURGERY [facility];  Service:     CERVICAL FUSION POSTERIOR  01/16/2009    Performed by TARA CONTRERAS at SURGERY [facility]    HARDWARE REMOVAL NEURO  01/16/2009    Performed by TARA CONTRERAS at SURGERY [facility]    NECK EXPLORATION  01/16/2009    Performed by TARA CONTRERAS at SURGERY [facility]    SHOULDER ARTHROSCOPY W/ ROTATOR CUFF REPAIR  10/09/2008    Performed by SHERLY CASTANEDA at [facility]    SHOULDER DECOMPRESSION ARTHROSCOPIC  06/17/2008    Performed by SHERLY CASTANEDA at [facility]    CLAVICLE DISTAL EXCISION  06/17/2008    Performed by SHERLY CASTANEDA at [facility]    CERVICAL DISK AND FUSION ANTERIOR  03/12/2008    HYSTERECTOMY, VAGINAL  2006    APPENDECTOMY  2004    THYROID LOBECTOMY  1973    TONSILLECTOMY  1963    CATARACT PHACO WITH IOL      LUMPECTOMY  1976, 2005    Breast     LUMPECTOMY      OTHER ABDOMINAL SURGERY  2004    OTHER CARDIAC SURGERY  2020 stents inserted         Relevant family history  Family History   Problem Relation Age of Onset    Hypertension Mother     Cancer Father          Social history  Social History     Tobacco Use    Smoking status: Every Day     Current packs/day: 0.50     Average packs/day: 0.5 packs/day for 30.0 years (15.0 ttl pk-yrs)     Types: Cigarettes    Smokeless tobacco: Never    Tobacco comments:     Currently smokes 1/2 - 3/4 a pack daily.  Has smoked for about 50 years.   Substance Use Topics    Alcohol use: Not Currently         Medications    Outpatient Medications Marked as Taking for the 11/14/24 encounter (Office Visit) with Aurelio Toney M.D.   Medication Sig Dispense Refill    Continuous Glucose Sensor (FREESTYLE  PARISA 3 PLUS SENSOR) Misc 1 Each every 14 days. 2 Each 11    ursodiol (ACTIGALL) 300 MG Cap Take 1 Capsule by mouth 3 times a day.      Insulin Lispro-aabc, 1 U Dial, (RAOUMKEATON KWIKPEN) 100 UNIT/ML Solution Pen-injector Inject 10 Units under the skin 3 times a day before meals. (Patient taking differently: Inject  under the skin 3 times a day before meals. Sliding scale  For surgery on 9/23/24,  DO NOT TAKE DAY OF SURGERY) 30 mL 3    Insulin Glargine, 1 Unit Dial, (TOUJEO SOLOSTAR) 300 UNIT/ML Solution Pen-injector Inject 45 Units under the skin every day. (Patient taking differently: Inject 45 Units under the skin every evening. For surgery on 9/23/24,  DO NOT TAKE DAY OF SURGERY) 13.5 mL 3    metoprolol SR (TOPROL XL) 100 MG TABLET SR 24 HR Take 150 mg by mouth every day. For surgery on 9/23/24,  CONTINUE TAKING MED PRIOR      SYNTHROID 125 MCG Tab Take 2 Tablets by mouth every morning on an empty stomach. (Patient taking differently: Take 250 mcg by mouth every morning on an empty stomach. For surgery on 9/23/24,  CONTINUE TAKING MED PRIOR) 180 Tablet 2    albuterol 108 (90 Base) MCG/ACT Aero Soln inhalation aerosol Inhale 2 Puffs every 6 hours as needed for Shortness of Breath. For surgery on 9/23/24,  CONTINUE TAKING MED PRIOR      amLODIPine (NORVASC) 5 MG Tab Take 10 mg by mouth every day. For surgery on 9/23/24,  CONTINUE TAKING MED PRIOR      Cyanocobalamin (VITAMIN B-12 PO) Take 1 Tablet by mouth every day. For surgery on 9/23/24,  DO NOT TAKE FOR 7 DAYS PRIOR      oxycodone (OXY-IR) 15 MG immediate release tablet Take 15 mg by mouth every four hours as needed for Severe Pain. For surgery on 9/23/24,  CONTINUE TAKING MED PRIOR      venlafaxine (EFFEXOR-XR) 150 MG extended-release capsule Take 150 mg by mouth every day.   For surgery on 9/23/24,  CONTINUE TAKING MED PRIOR      liothyronine (CYTOMEL) 5 MCG Tab Take 1 Tablet by mouth every day. (Patient taking differently: Take 5 mcg by mouth every day. For  surgery on 9/23/24,  CONTINUE TAKING MED PRIOR) 90 Tablet 3    famotidine (PEPCID) 20 MG Tab TAKE 1 TABLET BY MOUTH TWICE A DAY (Patient taking differently: Take 20 mg by mouth 2 times a day. For surgery on 9/23/24,  CONTINUE TAKING MED PRIOR) 180 Tablet 3    atorvastatin (LIPITOR) 40 MG Tab Take 1 Tablet by mouth every evening. (Patient taking differently: Take 40 mg by mouth every evening. For surgery on 9/23/24,  CONTINUE TAKING MED PRIOR) 100 Tablet 3    ondansetron (ZOFRAN ODT) 8 MG TABLET DISPERSIBLE Take 8 mg by mouth every 8 hours as needed for Nausea. For surgery on 9/23/24,  CONTINUE TAKING MED PRIOR      omeprazole (PRILOSEC) 40 MG delayed-release capsule Take 40 mg by mouth every day. For surgery on 9/23/24,  CONTINUE TAKING MED PRIOR      VITAMIN D, ERGOCALCIFEROL, PO Take 1 Tablet by mouth 2 times a day. For surgery on 9/23/24,  DO NOT TAKE FOR 7 DAYS PRIOR      traZODone (DESYREL) 150 MG Tab Take 150 mg by mouth at bedtime. For surgery on 9/23/24,  CONTINUE TAKING MED PRIOR           Physical exam    Vitals:    11/14/24 0845   BP: 126/60   Pulse: 80   Temp: 36.6 °C (97.8 °F)   SpO2: 98%       Focused Neurological Exam  Alert and oriented to all spheres.  Speech fluency and comprehension are intact.  There are no cranial neuropathies.  No dysmetria, ataxia, or tremors.  Muscle tone, bulk, and strength are normal and symmetric.  Normal sensation to light touch throughout.  Reflexes are 2+ in the patellar tendon, bilaterally.  The gait posture and movements are normal, without any form of support.       Depression Screening    Little interest or pleasure in doing things?  2 - more than half the days  Feeling down, depressed , or hopeless? 2 - more than half the days  Trouble falling or staying asleep, or sleeping too much?  3 - nearly every day  Feeling tired or having little energy?  3 - nearly every day  Poor appetite or overeating?  3 - nearly every day  Feeling bad about yourself - or that you are a  failure or have let yourself or your family down? 3 - nearly every day  Trouble concentrating on things, such as reading the newspaper or watching television? 3 - nearly every day  Moving or speaking so slowly that other people could have noticed.  Or the opposite - being so fidgety or restless that you have been moving around a lot more than usual?  2 - more than half the days  Thoughts that you would be better off dead, or of hurting yourself?  0 - not at all  Patient Health Questionnaire Score: 21      If depressive symptoms identified deferred to follow up visit unless specifically addressed in assesment and plan.    Interpretation of PHQ-9 Total Score   Score Severity   1-4 No Depression   5-9 Mild Depression   10-14 Moderate Depression   15-19 Moderately Severe Depression   20-27 Severe Depression      Laboratory results  Lab Results   Component Value Date/Time    WBC 7.8 10/18/2024 10:14 AM    RBC 4.36 10/18/2024 10:14 AM    HEMOGLOBIN 13.5 10/18/2024 10:14 AM    HEMATOCRIT 41.3 10/18/2024 10:14 AM    MCV 94.7 10/18/2024 10:14 AM    MCH 31.0 10/18/2024 10:14 AM    MCHC 32.7 10/18/2024 10:14 AM    MPV 10.7 10/18/2024 10:14 AM    NEUTSPOLYS 67.50 10/18/2024 10:14 AM    LYMPHOCYTES 16.40 (L) 10/18/2024 10:14 AM    MONOCYTES 8.10 10/18/2024 10:14 AM    EOSINOPHILS 5.90 10/18/2024 10:14 AM    BASOPHILS 1.70 10/18/2024 10:14 AM    HYPOCHROMIA 1+ 01/28/2020 04:09 AM    ANISOCYTOSIS 1+ 10/26/2021 03:08 AM          Lab Results   Component Value Date/Time    SODIUM 135 11/07/2024 08:08 AM    POTASSIUM 5.0 11/07/2024 08:08 AM    CHLORIDE 92 (L) 11/07/2024 08:08 AM    CO2 27 11/07/2024 08:08 AM    GLUCOSE 157 (H) 11/07/2024 08:08 AM    BUN 29 (H) 11/07/2024 08:08 AM    CREATININE 3.36 (H) 11/07/2024 08:08 AM    CREATININE 1.0 01/08/2009 04:31 PM        Lab Results   Component Value Date/Time    PROTHROMBTM 11.8 (L) 07/23/2024 08:07 AM    INR 0.85 (L) 07/23/2024 08:07 AM       Latest Reference Range & Units 10/18/24 10:14    Glycohemoglobin 4.0 - 5.6 % 10.6 (H)   (H): Data is abnormally high     Latest Reference Range & Units 10/18/24 10:14   Cholesterol,Tot 100 - 199 mg/dL 263 (H)   Triglycerides 0 - 149 mg/dL 87   HDL >=40 mg/dL 143   LDL <100 mg/dL 103 (H)   (H): Data is abnormally high     Latest Reference Range & Units 07/23/24 08:08 10/18/24 10:14   TSH 0.350 - 5.500 uIU/mL 24.200 (H) 4.230   (H): Data is abnormally high     Latest Reference Range & Units 10/18/24 10:14   25-Hydroxy   Vitamin D 25 30 - 100 ng/mL 51   Ferritin 10.0 - 291.0 ng/mL 1050.0 (H)   Folate -Folic Acid >4.0 ng/mL 6.4   Vitamin B1 70 - 180 nmol/L 138   Vitamin B12 -True Cobalamin 211 - 911 pg/mL 997 (H)   (H): Data is abnormally high    Imaging results  No pertinent imaging studies to review.        Impression  Briefly, 69 y.o. female with multiple medical co-morbidities and severe depression, as stated above. She comes with questions regarding management of RLS, which she has had for several decades. I recommended trying gabapentin once more, this time after each dialysis session every Monday, Wednesday, and Friday. She can start with 100 mg each one of these days right after finishing HD, and then she can titrate the dose up to 200-300 mg if there is a positive response. This can be titrated by her PCP.   Her last ferritin level was 1050 ng/mL, so there is no indication to start iron replacement for RLS.    For her depression, I have sent an urgent referral to psychiatry.      Plan  1. Restless leg syndrome    - gabapentin (NEURONTIN) 100 MG Cap; Take 1 Capsule by mouth every Monday, Wednesday, and Friday.  Dispense: 30 Capsule; Refill: 0    2. Severe episode of recurrent major depressive disorder, without psychotic features (HCC)    - Referral to Behavioral Health      Numerous questions were answered to the best of my knowledge. The patient is in agreement with the plan. Return to the clinic as needed    ADMINISTRATIVE BILLING  I spent a total of 45  minutes on this patient encounter.      Aurelio Toney MD  Neurologist & Neuro-Oncologist  Department of Neurology and Oncology  [facility]   of Clinical Neurology  [facility]

## 2024-11-15 ENCOUNTER — PATIENT OUTREACH (OUTPATIENT)
Dept: HEALTH INFORMATION MANAGEMENT | Facility: OTHER | Age: 69
End: 2024-11-15
Payer: MEDICARE

## 2024-11-15 DIAGNOSIS — Z79.4 LONG-TERM INSULIN USE (HCC): ICD-10-CM

## 2024-11-15 DIAGNOSIS — F41.9 ANXIETY: ICD-10-CM

## 2024-11-15 DIAGNOSIS — E10.9 TYPE 1 DIABETES MELLITUS ON INSULIN THERAPY (HCC): ICD-10-CM

## 2024-11-15 DIAGNOSIS — F33.9 RECURRENT MAJOR DEPRESSIVE DISORDER, REMISSION STATUS UNSPECIFIED (HCC): ICD-10-CM

## 2024-11-15 DIAGNOSIS — Z99.2 ESRD NEEDING DIALYSIS (HCC): ICD-10-CM

## 2024-11-15 DIAGNOSIS — I10 PRIMARY HYPERTENSION: ICD-10-CM

## 2024-11-15 DIAGNOSIS — N18.6 ESRD NEEDING DIALYSIS (HCC): ICD-10-CM

## 2024-11-18 RX ORDER — LIDOCAINE/PRILOCAINE 2.5 %-2.5%
CREAM (GRAM) TOPICAL PRN
COMMUNITY
Start: 2024-10-31

## 2024-11-18 NOTE — CARE PLAN
Problem: Knowledge Deficit with Chronic Kidney Disease process  Goal: (Long Term) Patient will maintain/demonstrate improvement in lab values  Outcome: Progressing  Note: Pt attending follow ups and lab draws as recommended by care team. She is collaborating with endocrinology to improve lab values.  Intervention: Educate on importance of routine lab draws  Intervention: Monitor labs electrolytes, GFR, microalbumin, BUN, Cr  Intervention: If Diabetic monitor glucose levels and A1c     Problem: Risk for unstable blood glucose levels related to insufficient knowledge of medication management  Goal: (Long Term) Achieve and maintain optimal blood glucose levels with HbA1c < 7%  Outcome: Not Progressing  Note: Recent HbA1c shows upward trend. Significant counseling provided by endocrinology. This RN also counseled pt re: identifying and addressing barriers to diabetes management.  Intervention: Review the patient's medication regimen and adherence  Intervention: Review and reinforce the correct use of diabetes medications  Intervention: Address any barriers to medication adherence, such as cost or complex regimens     Problem: Knowledge deficit of signs and symptoms of depression and anxiety  Goal: (Short Term) Patient able to identify personalized signs and symptoms of depression and anxiety.  Outcome: Progressing  Note: Pt is able to speak to life stressors that contribute to anxiety. Pt verbalizes negative effects that anxiety can have on overall health.  Intervention: Provide education about depression in lay terms  Intervention: Encourage psychoeducational learning opportunities  Intervention: Encourage identification of triggering events  Intervention: Educate on importance of self-monitoring medication and effects, reporting back to prescribing physician.  Intervention: Encourage patient to Engage in community-based support groups for older adults.  Intervention: Encourage patient to Identify a support person  they can talk to

## 2024-11-18 NOTE — PROGRESS NOTES
Assessment    Monthly outreach completed via telephone call with patient. Asha is doing okay at home, having just returned from dialysis. She had a few concerns, including needing a letter from her palliative care physician sent to her insurance provider, needing an early refill of topical lidocaine cream, and requesting a [facility] to [facility] [place] [facility] assistance. She also reports a small heel fissure and requested advice for treatment at home. This RN recommended application of liquid bandage, regular use of moisturizer to prevent excessive heel dryness, and meticulous foot care to avoid development of foot ulcer. Pt verbalized understanding. Asha also reports several small nonhealing cuts on her left hand, and believes this may be due to impaired LUE blood flow r/t dialysis graft. This RN advised her that she may use the liquid bandage on these cuts as well to promote wound healing but should monitor them closely for redness, heat, swelling, or drainage and report these symptoms to her care team immediately.    [facility] [place] completed online by this RN. This RN to reach out to [facility] and [facility] for clarification re: keeping pt on service with palliative care. This RN to communicate with Dr. Anderson to relay pt request for early refill of topical lidocaine for pain associated with dialysis graft access. Pt states she was unintentionally using too much lidocaine cream and is worried she will run out before refill is available. She reports she has since been educated on how to use proper amount of lidocaine to prevent severe pain associated with dialysis graft access.    Assessment by Diagnosis:    T1DM  Pt recently followed up with Endocrinology; lab work showed upward trend in HbA1c  Pt reports that she has had difficulty with medication and dietary compliance, but is also undergoing a significant amount of stress  Insulin dosing  adjusted, next follow-up 12/11    HTN  Most recent /60 mmHg  Denies difficulty adhering to prescribed medications    ESRD  Continuing with dialysis  Reports severe restless legs during dialysis; she was recently prescribed gabapentin 3 times weekly after dialysis  Pt is trying to maintain adequate hydration and protein intake per guidelines    Education and Self-Management of Care    Education topics discussed during this outreach include: skin self-assessment r/t screening for signs of infection; proper foot care with heel fissure present; importance of maintaining close outpatient endocrinology follow up; community resources available for light homemaking assistance; side effects of gabapentin including fatigue; role of impaired/reduced blood flow/perfusion in slowing wound healing.    Resources offered during this outreach include: [facility] referral placed; outreach to [facility] and [facility]; outreach to Nephrology for lidocaine prescription    Pt denies need for further resources at this time. Pt verbalized she will call [phone] if needs are identified before next monthly outreach.     Plan of Care and Goals    See Care Plan note for details.    Barriers:    Difficulty adhering to diabetic diet and medications; significant life stressors r/t ESRD and dialysis; involvement of multiple specialties in care    Progress:    Progressing as expected toward established goals, with exceptions. See Care Plan note.    Next outreach: December 2024.

## 2024-11-18 NOTE — PROGRESS NOTES
"This RN spoke with [facility] ([phone]) representative regarding pt report that she will be unable to continue service with them due to \"insurance changes.\" Representative states that Rachel insurance does not cover telehealth with a nurse practitioner, which is their only offering available as their nurse practitioner is located in [place] and would be unable to see her in-person. Representative indicated that they are planning to contact the patient to advise her of this and help her decide if she would like a referral sent to [facility] to continue services. This RN confirmed that [facility] is contracted with [facility]. No further action needed at this time.    1600 Pt returned call stating she is interested in receiving a referral to a local palliative care provider. This RN spoke to [facility] representative who requested a [facility] agent contact them with a local facility that accepts her insurance. The following information was given by [facility] agent:    Referral can be sent to:    [facility]   [address]   [address]  [place] [address]  [phone] (Main)  [phone] (Fax)    Send authorization concerns to [facility] Referrals Department at [phone], fax [phone]  [facility] customer service fax [phone], if needed.    The above information was relayed to [facility] at 1620.  "

## 2024-11-26 ENCOUNTER — OFFICE VISIT (OUTPATIENT)
Dept: VASCULAR SURGERY | Facility: MEDICAL CENTER | Age: 69
End: 2024-11-26
Payer: MEDICARE

## 2024-11-26 VITALS
HEIGHT: 66 IN | OXYGEN SATURATION: 96 % | HEART RATE: 76 BPM | WEIGHT: 129 LBS | DIASTOLIC BLOOD PRESSURE: 58 MMHG | BODY MASS INDEX: 20.73 KG/M2 | TEMPERATURE: 98.5 F | SYSTOLIC BLOOD PRESSURE: 118 MMHG

## 2024-11-26 DIAGNOSIS — N18.6 END STAGE RENAL DISEASE ON DIALYSIS (HCC): ICD-10-CM

## 2024-11-26 DIAGNOSIS — Z99.2 END STAGE RENAL DISEASE ON DIALYSIS (HCC): ICD-10-CM

## 2024-11-26 DIAGNOSIS — I77.89 ARTERIAL STEAL SYNDROME (HCC): ICD-10-CM

## 2024-11-26 DIAGNOSIS — E78.5 DYSLIPIDEMIA: ICD-10-CM

## 2024-11-26 DIAGNOSIS — I10 PRIMARY HYPERTENSION: ICD-10-CM

## 2024-11-26 ASSESSMENT — FIBROSIS 4 INDEX: FIB4 SCORE: 3.09

## 2024-11-26 NOTE — PROGRESS NOTES
"  VASCULAR SURGERY SERVICE  OFFICE FOLLOW UP      Date: 11/26/2024    Referring Provider: Carl Onofre MD    Consulting Physician: Denis Brown MD - [facility]     -------------------------------------------------------------------------------------------------    Chief complaint:  \"They told me that I need to have my dialysis graft ligated.\"    HPI:  Ms. Manuel returns to the clinic today at the recommendation of Dr. Onofre.  She underwent left upper arm dialysis graft placement using 4 to 7 mm step graft and peritoneal dialysis catheter removal on September 23, 2024.  Her dialysis graft has been working well; however, she developed small nonhealing ulcers in her left hand and forearm.  She is referred back for ligation of the dialysis graft.  She has a right IJ permacath.  She is on hemodialysis Monday, Wednesday, and Friday from 6 AM to 9 AM.    Past Medical History:   Diagnosis Date    Accidental drug overdose 08/27/2012    Adverse effect of anesthesia     in 2008 \"throat closes up\"\"anxiety\" ?laryngospasm, kept in ICU. Pt states no problems currently 2018.     Anemia     Anesthesia     in 2008 \"throat closes up\"\"anxiety\" ?laryngospasm, kept in ICU. Pt states no problems currently 2018.     Arthritis     right shoulder, hands    Bowel habit changes More frequent    Broken hip ([facility]) 04/2024    Broken hip possibly the pelvis.  Inoperable    Cataract 2022    Cigarette smoker quit 2013    Dental disorder     upper denture    depression 08/30/2016    denies depression, states has anxiety and panic attacks    Diabetes mellitus type 1 ([facility]) 1989    insulin    Dialysis patient ([facility]) Short time due to a kidney injury from a infection    Emphysema of lung ([facility])     COPD    Encounter for long-term (current) use of insulin ([facility]) 09/25/2013    GI bleed     Heart burn     High cholesterol     Hypertension     Hypothyroidism, postsurgical 1970    Indigestion     Infectious disease      had hepatitis C, tested " "neg.    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) 06/10/2015    Vertigo        Past Surgical History:   Procedure Laterality Date    PB REMOVE PERM CANNULA/CATHETER  9/23/2024    Procedure: REMOVAL OF PERITONEAL DIALYSIS CATHETER;  Surgeon: Denis Brown M.D.;  Location: SURGERY [facility];  Service: General    AV FISTULA CREATION Left 9/23/2024    Procedure: CREATION OF LEFT UPPER EXTREMITY DIALYSIS GRAFT;  Surgeon: Denis Brown M.D.;  Location: [facility];  Service: General    PB LAP INSERT INTRAPERITONEAL CATHETER  06/20/2024    Procedure: LAPAROSCOPIC INSERTION OF PERITONEAL DIALYSIS CATHETER;  Surgeon: Denis Brown M.D.;  Location: [facility];  Service: General    AK ERCP,DIAGNOSTIC N/A 01/14/2022    Procedure: ERCP, DIAGNOSTIC - WITH SIGMOID COLON BIOPSY AND STENT REMOVAL;  Surgeon: Cr Haro M.D.;  Location: [facility];  Service: Gastroenterology    THADDEUS BY LAPAROSCOPY N/A 10/28/2021    Procedure: CHOLECYSTECTOMY, LAPAROSCOPIC;  Surgeon: Tello Trammell M.D.;  Location: [facility];  Service: General    AK ERCP,DIAGNOSTIC N/A 10/26/2021    Procedure: ERCP (ENDOSCOPIC RETROGRADE CHOLANGIOPANCREATOGRAPHY);  Surgeon: Cr Haro M.D.;  Location: SURGERY SAME DAY [facility];  Service: Gastroenterology    AK UPPER GI ENDOSCOPY,BIOPSY N/A 10/26/2021    Procedure: GASTROSCOPY, WITH BIOPSY;  Surgeon: Cr Haro M.D.;  Location: SURGERY SAME DAY [facility];  Service: Gastroenterology    AK UPPER GI ENDOSCOPY,DIAGNOSIS N/A 10/26/2021    Procedure: GASTROSCOPY;  Surgeon: Cr Haro M.D.;  Location: SURGERY SAME DAY [facility];  Service: " Gastroenterology    CATH PLACEMENT  01/25/2020    Procedure: INSERTION, CATHETER PERM;  Surgeon: Rola Mendoza M.D.;  Location: SURGERY [facility];  Service: General    IRRIGATION & DEBRIDEMENT NEURO  01/19/2020    Procedure: IRRIGATION AND DEBRIDEMENT, WOUND THORACIC AND LUMBAR;  Surgeon: Ryan Roman M.D.;  Location: SURGERY [facility];  Service: Neurosurgery    HI INS/RPLC SPI NPG/RCVR POCKET N/A 12/16/2019    Procedure: EXPLORATION AT THORACIC 8 - 9, REPLACEMENT OF  NEUROSTIMULATOR LEAD;  Surgeon: Ryan Roman M.D.;  Location: SURGERY [facility];  Service: Neurosurgery    SPINAL CORD STIMULATOR N/A 10/26/2018    Procedure: SPINAL CORD STIMULATOR;  Surgeon: Ryan Roman M.D.;  Location: SURGERY [facility];  Service: Neurosurgery    THORACIC LAMINECTOMY N/A 10/26/2018    Procedure: THORACIC LAMINECTOMY - FOR;  Surgeon: Ryan Roman M.D.;  Location: SURGERY [facility];  Service: Neurosurgery    HI NJX AA&/STRD TFRML EPI LUMBAR/SACRAL 1 LEVEL Right 08/31/2016    Procedure: INJ-FORAMEN EPI LUM/SAC SNGL L4-5;  Surgeon: Sukhi Godfrey M.D.;  Location: SURGERY [facility];  Service: Pain Management    LUMBAR LAMINECTOMY DISKECTOMY Right 05/10/2016    Procedure: LUMBAR L4-5 HEMILAMINECTOMY DISKECTOMY ;  Surgeon: Arnold Keyes M.D.;  Location: [facility];  Service:     CERVICAL FUSION POSTERIOR  01/16/2009    Performed by TARA CONTRERAS at [facility]    HARDWARE REMOVAL NEURO  01/16/2009    Performed by TARA CONTRERAS at [facility]    NECK EXPLORATION  01/16/2009    Performed by TARA CONTRERAS at [facility]    SHOULDER ARTHROSCOPY W/ ROTATOR CUFF REPAIR  10/09/2008    Performed by SHERLY CASTANEDA at [facility]    SHOULDER DECOMPRESSION ARTHROSCOPIC  06/17/2008    Performed by SHERLY CASTANEDA at [facility]    CLAVICLE DISTAL EXCISION  06/17/2008    Performed by SHERLY CASTANEDA at [facility]  [facility] ORS    CERVICAL DISK AND FUSION ANTERIOR  03/12/2008    HYSTERECTOMY, VAGINAL  2006    APPENDECTOMY  2004    THYROID LOBECTOMY  1973    TONSILLECTOMY  1963    CATARACT PHACO WITH IOL      LUMPECTOMY  1976, 2005    Breast     LUMPECTOMY      OTHER ABDOMINAL SURGERY  2004    OTHER CARDIAC SURGERY  2020 stents inserted       Current Outpatient Medications   Medication Sig Dispense Refill    lidocaine-prilocaine (EMLA) 2.5-2.5 % Cream Apply  topically as needed. APPLY TO AFFECTED AREA AVF ACCESS 30-60 MINS PRIOR TO DIALYSIS TREATMENT WRAP IN SARAN WRAP      gabapentin (NEURONTIN) 100 MG Cap Take 1 Capsule by mouth every Monday, Wednesday, and Friday. 30 Capsule 0    Continuous Glucose Sensor (FREESTYLE PARISA 3 PLUS SENSOR) Misc 1 Each every 14 days. 2 Each 11    ursodiol (ACTIGALL) 300 MG Cap Take 1 Capsule by mouth 3 times a day.      Insulin Lispro-aabc, 1 U Dial, (LYUMJEV KWIKPEN) 100 UNIT/ML Solution Pen-injector Inject 10 Units under the skin 3 times a day before meals. (Patient taking differently: Inject  under the skin 3 times a day before meals. Sliding scale  For surgery on 9/23/24,  DO NOT TAKE DAY OF SURGERY) 30 mL 3    Insulin Glargine, 1 Unit Dial, (TOUJEO SOLOSTAR) 300 UNIT/ML Solution Pen-injector Inject 45 Units under the skin every day. (Patient taking differently: Inject 45 Units under the skin every evening. For surgery on 9/23/24,  DO NOT TAKE DAY OF SURGERY) 13.5 mL 3    metoprolol SR (TOPROL XL) 100 MG TABLET SR 24 HR Take 150 mg by mouth every day. For surgery on 9/23/24,  CONTINUE TAKING MED PRIOR      SYNTHROID 125 MCG Tab Take 2 Tablets by mouth every morning on an empty stomach. (Patient taking differently: Take 250 mcg by mouth every morning on an empty stomach. For surgery on 9/23/24,  CONTINUE TAKING MED PRIOR) 180 Tablet 2    albuterol 108 (90 Base) MCG/ACT Aero Soln inhalation aerosol Inhale 2 Puffs every 6 hours as needed for Shortness of Breath. For surgery on 9/23/24,  CONTINUE  TAKING MED PRIOR      amLODIPine (NORVASC) 5 MG Tab Take 10 mg by mouth every day. For surgery on 9/23/24,  CONTINUE TAKING MED PRIOR      Cyanocobalamin (VITAMIN B-12 PO) Take 1 Tablet by mouth every day. For surgery on 9/23/24,  DO NOT TAKE FOR 7 DAYS PRIOR      senna-docusate (SENOKOT S) 8.6-50 MG Tab Take 1 Tablet by mouth every day. For surgery on 9/23/24,  COORDINATE MEDICATION INSTRUCTIONS FROM PRESCRIBING PHYSICIAN, DO NOT TAKE DAY OF SURGERY      oxycodone (OXY-IR) 15 MG immediate release tablet Take 15 mg by mouth every four hours as needed for Severe Pain. For surgery on 9/23/24,  CONTINUE TAKING MED PRIOR      venlafaxine (EFFEXOR-XR) 150 MG extended-release capsule Take 150 mg by mouth every day.   For surgery on 9/23/24,  CONTINUE TAKING MED PRIOR      liothyronine (CYTOMEL) 5 MCG Tab Take 1 Tablet by mouth every day. (Patient taking differently: Take 5 mcg by mouth every day. For surgery on 9/23/24,  CONTINUE TAKING MED PRIOR) 90 Tablet 3    famotidine (PEPCID) 20 MG Tab TAKE 1 TABLET BY MOUTH TWICE A DAY (Patient taking differently: Take 20 mg by mouth 2 times a day. For surgery on 9/23/24,  CONTINUE TAKING MED PRIOR) 180 Tablet 3    atorvastatin (LIPITOR) 40 MG Tab Take 1 Tablet by mouth every evening. (Patient taking differently: Take 40 mg by mouth every evening. For surgery on 9/23/24,  CONTINUE TAKING MED PRIOR) 100 Tablet 3    ondansetron (ZOFRAN ODT) 8 MG TABLET DISPERSIBLE Take 8 mg by mouth every 8 hours as needed for Nausea. For surgery on 9/23/24,  CONTINUE TAKING MED PRIOR      omeprazole (PRILOSEC) 40 MG delayed-release capsule Take 40 mg by mouth every day. For surgery on 9/23/24,  CONTINUE TAKING MED PRIOR      VITAMIN D, ERGOCALCIFEROL, PO Take 1 Tablet by mouth 2 times a day. For surgery on 9/23/24,  DO NOT TAKE FOR 7 DAYS PRIOR      traZODone (DESYREL) 150 MG Tab Take 150 mg by mouth at bedtime. For surgery on 9/23/24,  CONTINUE TAKING MED PRIOR       No current  facility-administered medications for this visit.       Social History     Socioeconomic History    Marital status:      Spouse name: Not on file    Number of children: Not on file    Years of education: Not on file    Highest education level: Not on file   Occupational History    Not on file   Tobacco Use    Smoking status: Every Day     Current packs/day: 0.50     Average packs/day: 0.5 packs/day for 30.0 years (15.0 ttl pk-yrs)     Types: Cigarettes    Smokeless tobacco: Never    Tobacco comments:     Currently smokes 1/2 - 3/4 a pack daily.  Has smoked for about 50 years.   Vaping Use    Vaping status: Never Used   Substance and Sexual Activity    Alcohol use: Not Currently    Drug use: Not Currently     Types: Inhaled, Oral, Marijuana     Comment: Marijuana - Occasional; edibles    Sexual activity: Not Currently   Other Topics Concern    Not on file   Social History Narrative    Not on file     Social Drivers of Health     Financial Resource Strain: Medium Risk (10/16/2024)    Overall Financial Resource Strain (CARDIA)     Difficulty of Paying Living Expenses: Somewhat hard   Food Insecurity: Food Insecurity Present (10/16/2024)    Hunger Vital Sign     Worried About Running Out of Food in the Last Year: Sometimes true     Ran Out of Food in the Last Year: Never true   Transportation Needs: No Transportation Needs (10/16/2024)    PRAPARE - Transportation     Lack of Transportation (Medical): No     Lack of Transportation (Non-Medical): No   Physical Activity: Inactive (10/16/2024)    Exercise Vital Sign     Days of Exercise per Week: 0 days     Minutes of Exercise per Session: 0 min   Stress: Stress Concern Present (10/16/2024)    [place] [employer] - Occupational Stress Questionnaire     Feeling of Stress : Very much   Social Connections: Socially Isolated (10/16/2024)    Social Connection and Isolation Panel [NHANES]     Frequency of Communication with Friends and Family: Three  "times a week     Frequency of Social Gatherings with Friends and Family: More than three times a week     Attends Zoroastrian Services: Never     Active Member of Clubs or Organizations: No     Attends Club or Organization Meetings: Never     Marital Status:    Intimate Partner Violence: Not At Risk (10/16/2024)    Humiliation, Afraid, Rape, and Kick questionnaire     Fear of Current or Ex-Partner: No     Emotionally Abused: No     Physically Abused: No     Sexually Abused: No   Housing Stability: Low Risk  (10/16/2024)    Housing Stability Vital Sign     Unable to Pay for Housing in the Last Year: No     Number of Times Moved in the Last Year: 0     Homeless in the Last Year: No       Family History   Problem Relation Age of Onset    Hypertension Mother     Cancer Father        Allergies:  Tape    Review of Systems:    Constitutional: Negative for fever, chills, weight loss,   HENT:    Negative for hearing loss or tinnitus    Eyes:    Negative for blurred vision, double vision, or loss of vision  Respiratory:  Negative for cough, hemoptysis, or wheezing    Cardiac:  Negative for chest pain or palpitations or orthopnea  Vascular:  Negative for claudication or rest pain   Gastrointestinal: Negative for nausea, vomiting, or abdominal pain     Negative for hematochezia or melena   Genitourinary: Negative for dysuria, frequency, or hematuria   Musculoskeletal: Negative for myalgias, back pain, or joint pain  Skin:   Negative for itching or rash  Neurological:  Negative for dizziness, headaches, or tremors     Negative for speech disturbance     Negative for extremity weakness or paresthesias  Endo/Heme:  Positive for easy bruising or bleeding  Psychiatric:  Negative for depression, suicidal ideas, or hallucinations    Physical Exam:  /58 (BP Location: Right arm, Patient Position: Sitting, BP Cuff Size: Adult)   Pulse 76   Temp 36.9 °C (98.5 °F) (Temporal)   Ht 1.676 m (5' 6\")   Wt 58.5 kg (129 lb)   SpO2 " 96%     Constitutional: Alert, oriented, no acute distress  HEENT:  Normocephalic and atraumatic, EOMI  Neck:   Supple, no JVD.  Right IJ permacath in place.  Cardiovascular: Regular rate and rhythm  Pulmonary:  Good air entry bilaterally  Abdominal:  Soft, non-tender, non-distended     Aortic impulse not widened  Musculoskeletal: No edema, no tenderness  Neurological:  CN II-XII grossly intact, no focal deficits  Skin:   Skin is warm and dry. No rash noted.  Psychiatric:  Normal mood and affect.  Left upper extremity: Well-healed scar.  Graft is patent with great thrills.  Multiple small ulcers in left fingers as well as left forearm.  Slight cyanosis of the left fingers.  Lower extremities: Feet are warm, no ulceration.  Pedal pulses are not palpable on either side.      Assessment:  -Left arm dialysis graft steal syndrome.  -End-stage renal disease.  -Hypertension.  -Dyslipidemia.  -Diabetes mellitus.    Plan:  I had a long discussion with patient.  I recommended that patient undergo ligation of the left upper arm dialysis graft in the operating room.  Risks and benefits were discussed.  Risks include, but not limited to, bleeding, infection, and perioperative anesthetic complications.  Patient indicated understanding and would like to proceed.  All questions were answered.  At her request, the procedure will be performed on December 4 or December 5, pending operating room availability.    Patient was made aware that she would become catheter dependent after the graft is ligated since placing a graft in the right arm would subject the right hand to the same problem with arterial steal syndrome.  Placement of the graft in the thigha would not be a good option since the patient appeared to have arterial disease in both lower extremities as well.      Denis Brown MD  [facility]   [place] preferred or call my office  [phone]  __________________________________________________________________  Patient:Anu Spaulding Kaleb   MRN:[id number]   [facility]:[id number]

## 2024-11-27 ENCOUNTER — APPOINTMENT (OUTPATIENT)
Dept: ADMISSIONS | Facility: MEDICAL CENTER | Age: 69
End: 2024-11-27
Attending: SURGERY
Payer: MEDICARE

## 2024-12-03 ENCOUNTER — PRE-ADMISSION TESTING (OUTPATIENT)
Dept: ADMISSIONS | Facility: MEDICAL CENTER | Age: 69
End: 2024-12-03
Attending: SURGERY
Payer: MEDICARE

## 2024-12-03 NOTE — OR NURSING
FOR SURGERY ON: 12/5/24  Patient provided medication instructions per [facility]'s Guideline for Pre-Operative Medication Management. Preparing For Your Procedure packet reviewed with patient. Patient following diet, fasting, and fluid recommendations provided by her surgeon's office and dialysis team. Patient advised to contact surgeon with any additional questions or concerns.     Patient verbalized understanding of their instructions.    Patient's medication instructions mailed to the address on file.    Fall risk    Adverse reaction to anesthesia: PATIENT REPORTS SHE USED TO WAKE UP WITH PANIC ATTACKS BUT HAS WORKED ON THIS AND IT HAS IMPROVED WITH MORE RECENT PROCEDURES.     DIALYSIS M, W, F AT [facility]

## 2024-12-05 ENCOUNTER — ANESTHESIA (OUTPATIENT)
Dept: SURGERY | Facility: MEDICAL CENTER | Age: 69
End: 2024-12-05
Payer: MEDICARE

## 2024-12-05 ENCOUNTER — ANESTHESIA EVENT (OUTPATIENT)
Dept: SURGERY | Facility: MEDICAL CENTER | Age: 69
End: 2024-12-05
Payer: MEDICARE

## 2024-12-05 ENCOUNTER — HOSPITAL ENCOUNTER (OUTPATIENT)
Facility: MEDICAL CENTER | Age: 69
End: 2024-12-05
Attending: SURGERY | Admitting: SURGERY
Payer: MEDICARE

## 2024-12-05 VITALS
DIASTOLIC BLOOD PRESSURE: 62 MMHG | OXYGEN SATURATION: 97 % | BODY MASS INDEX: 19.73 KG/M2 | HEART RATE: 68 BPM | HEIGHT: 66 IN | SYSTOLIC BLOOD PRESSURE: 114 MMHG | WEIGHT: 122.8 LBS | TEMPERATURE: 97.2 F | RESPIRATION RATE: 18 BRPM

## 2024-12-05 LAB
ALBUMIN SERPL BCP-MCNC: 3.7 G/DL (ref 3.2–4.9)
ALBUMIN/GLOB SERPL: 0.7 G/DL
ALP SERPL-CCNC: 952 U/L (ref 30–99)
ALT SERPL-CCNC: 91 U/L (ref 2–50)
ANION GAP SERPL CALC-SCNC: 17 MMOL/L (ref 7–16)
AST SERPL-CCNC: 108 U/L (ref 12–45)
BILIRUB SERPL-MCNC: 1.2 MG/DL (ref 0.1–1.5)
BUN BLD-MCNC: 41 MG/DL (ref 8–22)
BUN SERPL-MCNC: 36 MG/DL (ref 8–22)
CA-I BLD ISE-SCNC: 1.09 MMOL/L (ref 1.1–1.3)
CALCIUM ALBUM COR SERPL-MCNC: 9.6 MG/DL (ref 8.5–10.5)
CALCIUM SERPL-MCNC: 9.4 MG/DL (ref 8.5–10.5)
CHLORIDE BLD-SCNC: 97 MMOL/L (ref 96–112)
CHLORIDE SERPL-SCNC: 91 MMOL/L (ref 96–112)
CO2 BLD-SCNC: 24 MMOL/L (ref 20–33)
CO2 SERPL-SCNC: 18 MMOL/L (ref 20–33)
CREAT BLD-MCNC: 5.5 MG/DL (ref 0.5–1.4)
CREAT SERPL-MCNC: 4.98 MG/DL (ref 0.5–1.4)
EKG IMPRESSION: NORMAL
ERYTHROCYTE [DISTWIDTH] IN BLOOD BY AUTOMATED COUNT: 50.1 FL (ref 35.9–50)
GFR SERPLBLD CREATININE-BSD FMLA CKD-EPI: 9 ML/MIN/1.73 M 2
GLOBULIN SER CALC-MCNC: 5.3 G/DL (ref 1.9–3.5)
GLUCOSE BLD STRIP.AUTO-MCNC: 194 MG/DL (ref 65–99)
GLUCOSE BLD-MCNC: 199 MG/DL (ref 65–99)
GLUCOSE SERPL-MCNC: 205 MG/DL (ref 65–99)
HCT VFR BLD AUTO: 41.3 % (ref 37–47)
HGB BLD-MCNC: 13.6 G/DL (ref 12–16)
INR PPP: 0.93 (ref 0.87–1.13)
MCH RBC QN AUTO: 31.1 PG (ref 27–33)
MCHC RBC AUTO-ENTMCNC: 32.9 G/DL (ref 32.2–35.5)
MCV RBC AUTO: 94.3 FL (ref 81.4–97.8)
PLATELET # BLD AUTO: 238 K/UL (ref 164–446)
PMV BLD AUTO: 10.1 FL (ref 9–12.9)
POTASSIUM BLD-SCNC: 4.6 MMOL/L (ref 3.6–5.5)
POTASSIUM SERPL-SCNC: 4.6 MMOL/L (ref 3.6–5.5)
PROT SERPL-MCNC: 9 G/DL (ref 6–8.2)
PROTHROMBIN TIME: 12.4 SEC (ref 12–14.6)
RBC # BLD AUTO: 4.38 M/UL (ref 4.2–5.4)
SODIUM BLD-SCNC: 131 MMOL/L (ref 135–145)
SODIUM SERPL-SCNC: 126 MMOL/L (ref 135–145)
WBC # BLD AUTO: 9.1 K/UL (ref 4.8–10.8)

## 2024-12-05 PROCEDURE — 700111 HCHG RX REV CODE 636 W/ 250 OVERRIDE (IP): Mod: JG | Performed by: SURGERY

## 2024-12-05 PROCEDURE — 160009 HCHG ANES TIME/MIN: Performed by: SURGERY

## 2024-12-05 PROCEDURE — 160028 HCHG SURGERY MINUTES - 1ST 30 MINS LEVEL 3: Performed by: SURGERY

## 2024-12-05 PROCEDURE — 160002 HCHG RECOVERY MINUTES (STAT): Performed by: SURGERY

## 2024-12-05 PROCEDURE — 160035 HCHG PACU - 1ST 60 MINS PHASE I: Performed by: SURGERY

## 2024-12-05 PROCEDURE — 93010 ELECTROCARDIOGRAM REPORT: CPT | Performed by: INTERNAL MEDICINE

## 2024-12-05 PROCEDURE — 85027 COMPLETE CBC AUTOMATED: CPT

## 2024-12-05 PROCEDURE — 80047 BASIC METABLC PNL IONIZED CA: CPT | Mod: XU

## 2024-12-05 PROCEDURE — 37607 LIG/BANDING ANGIOACS AV FSTL: CPT | Performed by: SURGERY

## 2024-12-05 PROCEDURE — 160025 RECOVERY II MINUTES (STATS): Performed by: SURGERY

## 2024-12-05 PROCEDURE — 160046 HCHG PACU - 1ST 60 MINS PHASE II: Performed by: SURGERY

## 2024-12-05 PROCEDURE — 700105 HCHG RX REV CODE 258: Performed by: SURGERY

## 2024-12-05 PROCEDURE — 82962 GLUCOSE BLOOD TEST: CPT

## 2024-12-05 PROCEDURE — 160039 HCHG SURGERY MINUTES - EA ADDL 1 MIN LEVEL 3: Performed by: SURGERY

## 2024-12-05 PROCEDURE — 700111 HCHG RX REV CODE 636 W/ 250 OVERRIDE (IP): Mod: JZ | Performed by: ANESTHESIOLOGY

## 2024-12-05 PROCEDURE — 85610 PROTHROMBIN TIME: CPT

## 2024-12-05 PROCEDURE — 93005 ELECTROCARDIOGRAM TRACING: CPT | Mod: TC | Performed by: SURGERY

## 2024-12-05 PROCEDURE — 700101 HCHG RX REV CODE 250: Performed by: SURGERY

## 2024-12-05 PROCEDURE — 160048 HCHG OR STATISTICAL LEVEL 1-5: Performed by: SURGERY

## 2024-12-05 PROCEDURE — 80053 COMPREHEN METABOLIC PANEL: CPT

## 2024-12-05 PROCEDURE — C1713 ANCHOR/SCREW BN/BN,TIS/BN: HCPCS | Performed by: SURGERY

## 2024-12-05 PROCEDURE — 36415 COLL VENOUS BLD VENIPUNCTURE: CPT

## 2024-12-05 RX ORDER — DIPHENHYDRAMINE HYDROCHLORIDE 50 MG/ML
12.5 INJECTION INTRAMUSCULAR; INTRAVENOUS
Status: DISCONTINUED | OUTPATIENT
Start: 2024-12-05 | End: 2024-12-05 | Stop reason: HOSPADM

## 2024-12-05 RX ORDER — CEFAZOLIN SODIUM 1 G/3ML
INJECTION, POWDER, FOR SOLUTION INTRAMUSCULAR; INTRAVENOUS PRN
Status: DISCONTINUED | OUTPATIENT
Start: 2024-12-05 | End: 2024-12-05 | Stop reason: SURG

## 2024-12-05 RX ORDER — HALOPERIDOL 5 MG/ML
1 INJECTION INTRAMUSCULAR
Status: DISCONTINUED | OUTPATIENT
Start: 2024-12-05 | End: 2024-12-05 | Stop reason: HOSPADM

## 2024-12-05 RX ORDER — BUPIVACAINE HYDROCHLORIDE 2.5 MG/ML
INJECTION, SOLUTION EPIDURAL; INFILTRATION; INTRACAUDAL
Status: DISCONTINUED | OUTPATIENT
Start: 2024-12-05 | End: 2024-12-05 | Stop reason: HOSPADM

## 2024-12-05 RX ORDER — OXYCODONE HCL 5 MG/5 ML
5 SOLUTION, ORAL ORAL
Status: DISCONTINUED | OUTPATIENT
Start: 2024-12-05 | End: 2024-12-05 | Stop reason: HOSPADM

## 2024-12-05 RX ORDER — ONDANSETRON 2 MG/ML
4 INJECTION INTRAMUSCULAR; INTRAVENOUS
Status: DISCONTINUED | OUTPATIENT
Start: 2024-12-05 | End: 2024-12-05 | Stop reason: HOSPADM

## 2024-12-05 RX ORDER — OXYCODONE HCL 5 MG/5 ML
10 SOLUTION, ORAL ORAL
Status: DISCONTINUED | OUTPATIENT
Start: 2024-12-05 | End: 2024-12-05 | Stop reason: HOSPADM

## 2024-12-05 RX ORDER — RIFAMPIN 300 MG/1
300 CAPSULE ORAL 2 TIMES DAILY
Status: ON HOLD | COMMUNITY

## 2024-12-05 RX ORDER — SODIUM CHLORIDE 9 MG/ML
INJECTION, SOLUTION INTRAVENOUS ONCE
Status: COMPLETED | OUTPATIENT
Start: 2024-12-05 | End: 2024-12-05

## 2024-12-05 RX ORDER — MIDAZOLAM HYDROCHLORIDE 1 MG/ML
INJECTION INTRAMUSCULAR; INTRAVENOUS PRN
Status: DISCONTINUED | OUTPATIENT
Start: 2024-12-05 | End: 2024-12-05 | Stop reason: SURG

## 2024-12-05 RX ADMIN — SODIUM CHLORIDE: 9 INJECTION, SOLUTION INTRAVENOUS at 14:18

## 2024-12-05 RX ADMIN — FENTANYL CITRATE 50 MCG: 50 INJECTION, SOLUTION INTRAMUSCULAR; INTRAVENOUS at 14:25

## 2024-12-05 RX ADMIN — PROPOFOL 60 MG: 10 INJECTION, EMULSION INTRAVENOUS at 14:25

## 2024-12-05 RX ADMIN — MIDAZOLAM HYDROCHLORIDE 1 MG: 1 INJECTION, SOLUTION INTRAMUSCULAR; INTRAVENOUS at 14:25

## 2024-12-05 RX ADMIN — CEFAZOLIN 2 G: 1 INJECTION, POWDER, FOR SOLUTION INTRAMUSCULAR; INTRAVENOUS at 14:29

## 2024-12-05 ASSESSMENT — PAIN SCALES - GENERAL: PAIN_LEVEL: 2

## 2024-12-05 ASSESSMENT — FIBROSIS 4 INDEX
FIB4 SCORE: 3.09
FIB4 SCORE: 3.09

## 2024-12-05 ASSESSMENT — PAIN DESCRIPTION - PAIN TYPE: TYPE: SURGICAL PAIN

## 2024-12-05 NOTE — ANESTHESIA POSTPROCEDURE EVALUATION
Patient: Anu Manuel    Procedure Summary       Date: 12/05/24 Room / Location: [place] [address] / SURGERY [facility]    Anesthesia Start: 1418 Anesthesia Stop: 1458    Procedure: LIGATION OF LEFT UPPER ARM DIALYSIS GRAFT Diagnosis: (ARTERIAL STEAL SYNDROME)    Surgeons: Denis Brown M.D. Responsible Provider: Arnold Almodovar M.D.    Anesthesia Type: MAC ASA Status: 3            Final Anesthesia Type: MAC  Last vitals  BP   Blood Pressure : 110/60    Temp   36.7 °C (98 °F)    Pulse   69   Resp   16    SpO2   99 %      Anesthesia Post Evaluation    Patient location during evaluation: PACU  Patient participation: complete - patient participated  Level of consciousness: awake and alert  Pain score: 2    Airway patency: patent  Anesthetic complications: no  Cardiovascular status: hemodynamically stable  Respiratory status: acceptable  Hydration status: euvolemic    PONV: none          There were no known notable events for this encounter.     Nurse Pain Score: 0 (NPRS)

## 2024-12-05 NOTE — DISCHARGE INSTRUCTIONS
HOME CARE INSTRUCTIONS    ACTIVITY: Rest and take it easy for the first 24 hours.  A responsible adult is recommended to remain with you during that time.  It is normal to feel sleepy.  We encourage you to not do anything that requires balance, judgment or coordination.    FOR 24 HOURS DO NOT:  Drive, operate machinery or run household appliances.  Drink beer or alcoholic beverages.  Make important decisions or sign legal documents.    SPECIAL INSTRUCTIONS:  1) Activities: No lifting more than half gallon of milk using left hand for 2 weeks. May remove dressing after 3 days.  2) Medications: Resume home medication.  May take Tylenol as needed for discomfort.   3) Follow-up with [place] in vascular clinic in 2 to 3 weeks.  Call [phone] for appointment and for any problem.     DIET: To avoid nausea, slowly advance diet as tolerated, avoiding spicy or greasy foods for the first day.  Add more substantial food to your diet according to your physician's instructions. INCREASE FLUIDS AND FIBER TO AVOID CONSTIPATION.    SURGICAL DRESSING/BATHING: Keep dressing clean, dry, and intact    MEDICATIONS: Resume taking daily medication.  Take prescribed pain medication with food.  If no medication is prescribed, you may take non-aspirin pain medication if needed.  PAIN MEDICATION CAN BE VERY CONSTIPATING.  Take a stool softener or laxative such as senokot, pericolace, or milk of magnesia if needed.    No new medications were prescribed.     A follow-up appointment should be arranged with your doctor in 1-2 weeks; call to schedule.    You should CALL YOUR PHYSICIAN if you develop:  Fever greater than 101 degrees F.  Pain not relieved by medication, or persistent nausea or vomiting.  Excessive bleeding (blood soaking through dressing) or unexpected drainage from the wound.  Extreme redness or swelling around the incision site, drainage of pus or foul smelling drainage.  Inability to urinate or empty your bladder within 8  hours.  Problems with breathing or chest pain.    You should call 911 if you develop problems with breathing or chest pain.  If you are unable to contact your doctor or surgical center, you should go to the nearest emergency room or urgent care center.  Physician's telephone #:    [phone]       MILD FLU-LIKE SYMPTOMS ARE NORMAL.  YOU MAY EXPERIENCE GENERALIZED MUSCLE ACHES, THROAT IRRITATION, HEADACHE AND/OR SOME NAUSEA.    If any questions arise, call your doctor.  If your doctor is not available, please feel free to call the Surgical Center at [phone].  The Center is open Monday through Friday from 7AM to 7PM.      A registered nurse may call you a few days after your surgery to see how you are doing after your procedure.    You may also receive a survey in the mail within the next two weeks and we ask that you take a few moments to complete the survey and return it to us.  Our goal is to provide you with very good care and we value your comments.     Depression / Suicide Risk    As you are discharged from this [facility] facility, it is important to learn how to keep safe from harming yourself.    Recognize the warning signs:  Abrupt changes in personality, positive or negative- including increase in energy   Giving away possessions  Change in eating patterns- significant weight changes-  positive or negative  Change in sleeping patterns- unable to sleep or sleeping all the time   Unwillingness or inability to communicate  Depression  Unusual sadness, discouragement and loneliness  Talk of wanting to die  Neglect of personal appearance   Rebelliousness- reckless behavior  Withdrawal from people/activities they love  Confusion- inability to concentrate     If you or a loved one observes any of these behaviors or has concerns about self-harm, here's what you can do:  Talk about it- your feelings and reasons for harming yourself  Remove any means that you might use to hurt yourself (examples: pills,  rope, extension cords, firearm)  Get professional help from the community (Mental Health, Substance Abuse, psychological counseling)  Do not be alone:Call your Safe Contact- someone whom you trust who will be there for you.  Call your local CRISIS HOTLINE 629-1855 or 442-301-8088  Call your local Children's Mobile Crisis Response Team [place] (233) 314-0687 or www.[employer]  Call the toll free National Suicide Prevention Hotlines   National Suicide Prevention Lifeline 603-842-IKZZ (7515)  [facility] Line Network 800-SUICIDE (334-2382)    I acknowledge receipt and understanding of these Home Care instructions.

## 2024-12-05 NOTE — OR NURSING
Pt arrives from OR to PACU at 1452. Pt identification verified by team, pt placed on all monitors with alarms audible, report and care of pt received from Anesthesiologist and RN. Assessment completed, pt changed into hospital gown and provided with warm blankets.     This RN called patient's friend to update her. Call went to voicemail x2 and voicemail box full. Updated patient's friend via messaging through epic.

## 2024-12-05 NOTE — ANESTHESIA TIME REPORT
Anesthesia Start and Stop Event Times       Date Time Event    12/5/2024 1410 Ready for Procedure     1418 Anesthesia Start     1458 Anesthesia Stop          Responsible Staff  12/05/24      Name Role Begin End    Arnold Almodovar M.D. Anesth 1418 1451          Overtime Reason:  no overtime (within assigned shift)    Comments:

## 2024-12-05 NOTE — ANESTHESIA PREPROCEDURE EVALUATION
Case: [id number] Date/Time: 12/05/24 1345    Procedure: LIGATION OF LEFT UPPER ARM DIALYSIS GRAFT    Pre-op diagnosis: ARTERIAL STEAL SYNDROME    Location: [facility] OR 14 / SURGERY [facility]    Surgeons: Denis Brown M.D.            Relevant Problems   PULMONARY   (positive) Chronic obstructive pulmonary disease      CARDIAC   (positive) Aortic atherosclerosis (HCC)   (positive) CAD S/P percutaneous coronary angioplasty   (positive) Primary hypertension      GI   (positive) GERD (gastroesophageal reflux disease)         (positive) RHEA (acute kidney injury) (HCC)   (positive) CKD (chronic kidney disease) stage 4, GFR 15-29 ml/min (HCC)   (positive) ESRD needing dialysis (HCC)   (positive) Liver failure without hepatic coma (HCC)   (positive) Stage 3a chronic kidney disease   (positive) Stage 3b chronic kidney disease      ENDO   (positive) Hypothyroidism, postsurgical   (positive) Type 1 diabetes mellitus on insulin therapy (HCC)       Physical Exam    Airway   Mallampati: II  TM distance: >3 FB  Neck ROM: full       Cardiovascular - normal exam  Rhythm: regular  Rate: normal  (-) murmur     Dental - normal exam           Pulmonary - normal exam  Breath sounds clear to auscultation     Abdominal    Neurological - normal exam                   Anesthesia Plan    ASA 3   ASA physical status 3 criteria: ESRD undergoing regularly scheduled dialysis    Plan - MAC               Induction: intravenous    Postoperative Plan: Postoperative administration of opioids is intended.    Pertinent diagnostic labs and testing reviewed    Informed Consent:    Anesthetic plan and risks discussed with patient.    Use of blood products discussed with: patient whom consented to blood products.

## 2024-12-05 NOTE — OR SURGEON
Immediate Post OP Note    PreOp Diagnosis: Left arm arterial steal syndrome.      PostOp Diagnosis: Same.      Procedure(s):  Ligation of left upper arm dialysis graft.    Surgeon(s):  Denis Brown M.D.    Anesthesiologist/Type of Anesthesia:  Anesthesiologist: Arnold Almodovar M.D./* No anesthesia type entered *    Surgical Staff:  Circulator: Carin Schmid R.N.  Scrub Person: Jaimee Toscano    Specimens removed if any:  * No specimens in log *    Estimated Blood Loss: 2 mL.    Findings: Successful ligation.    Complications: None.    Dictated, #[id number].        12/5/2024 2:51 PM Denis Brown M.D.

## 2024-12-05 NOTE — PROGRESS NOTES
Medication history reviewed with PT at bedside    Med rec is complete per PT reporting    Allergies reviewed.     PT is on Rifampin 300mg daily. Last dose was 12/04/2024 in the PM    Patient is not taking anticoagulants.    PT gets dialysis on Mon, Wed and Fri at [facility] on [address] ([phone]). Last treatment was 12/04/204.    Preferred pharmacy for this visit - [facility] on [address] ([phone])

## 2024-12-06 NOTE — OR NURSING
Pt arrived to Phase II. Patient's VSS. Dressing CDI.  Pt verbalizing requesto to go home. IV removed. Emesis bag given to patient. Discharge instructions reviewed with patient. All questions answered. All belongings returned to patient and prescriptions given. VSS

## 2024-12-06 NOTE — OP REPORT
DATE OF SERVICE:  12/05/2024     SURGEON:  Denis Brown MD     ANESTHESIOLOGIST:  Arnold Almodovar MD     TYPE OF ANESTHESIA:  Monitored anesthesia care and local anesthesia with 10 mL   mixture of 1% lidocaine and 0.25% Marcaine solution.     PREOPERATIVE DIAGNOSIS:  Left arm arterial steal syndrome.     POSTOPERATIVE DIAGNOSIS:  Left arm arterial steal syndrome.     PROCEDURE:  Ligation of left upper arm dialysis graft.     INDICATIONS FOR PROCEDURE:  This is a pleasant 69-year-old female with   multiple medical problems including end-stage renal disease, who previously   underwent a left upper arm dialysis graft placement.  The graft has been   working well; however, the patient developed signs and symptoms of arterial   steal.  Discussion was made with the patient.  She would like to undergo   ligation of the graft, fully understanding all risks.     DESCRIPTION OF PROCEDURE:  Informed consent was obtained.  The patient was   taken to the operating room and was placed in the supine position.  Sequential   compression devices were applied.  The patient was given Ancef intravenously.    Monitored anesthesia care was carried out.  The patient was given Ancef   intravenously.     Next, her left upper extremity was sterilely prepped and draped in the normal   fashion.  A timeout procedure was done.  The skin and subcutaneous tissues   over the graft, above the arterial anastomosis, were anesthetized with a   mixture of 10 mL of 1% lidocaine and 0.25% Marcaine solution.  An incision was   made over the old scar.  The incision was extended through subcutaneous   tissue using the electrocautery.  The graft was identified and carefully   dissected free from the surrounding tissue.  It was then ligated with two 3-0   silk ties.     The wound was irrigated and was found to have excellent hemostasis.  The   Wound was closed subcutaneously with running 3-0 Vicryl suture and   subcuticularly with 4-0 Monocryl suture.  The  wound was cleaned and sterile   dressing was applied.     ESTIMATED BLOOD LOSS:  2 mL.     COUNTS:  Sponge and instrument count was correct x2.     The patient was then awakened and taken to recovery area in stable condition   with brisk Doppler flow signals over the left distal radial artery.        ______________________________  MD LENIN Montelongo/LINDSAY    DD:  12/05/2024 14:55  DT:  12/05/2024 16:04    Job#:  [id number]

## 2024-12-11 ENCOUNTER — TELEPHONE (OUTPATIENT)
Dept: ENDOCRINOLOGY | Facility: MEDICAL CENTER | Age: 69
End: 2024-12-11
Payer: MEDICARE

## 2024-12-11 NOTE — TELEPHONE ENCOUNTER
Pt called to cancel her appt because she is feeling sick. Rescheduled her to another date. Kindly made her aware of our no show/ late policy. Pt understood

## 2024-12-12 ENCOUNTER — TELEPHONE (OUTPATIENT)
Dept: HEALTH INFORMATION MANAGEMENT | Facility: OTHER | Age: 69
End: 2024-12-12
Payer: MEDICARE

## 2024-12-12 DIAGNOSIS — E78.5 DYSLIPIDEMIA: ICD-10-CM

## 2024-12-12 NOTE — TELEPHONE ENCOUNTER
Pt called in requesting care coordination assistance. She has not yet been able to re-establish with Palliative Care since last outreach. She also expressed interest in stopping dialysis, but has not had a chance to speak with her physician regarding this. This RN to reach out to [facility] and/or [facility] to assist pt with re-establishment of care.    1000 Spoke with [facility] representative, who stated they have not yet received a referral. Advised this RN to reach out to pt's PCP for a referral to establish services.    1015 This RN spoke with [facility] referral coordinator regarding pt, for assistance with sending referral. MA stated they will work on submitting the referral to [facility]. This RN gave fax number for the department, as well as callback number for questions/concerns.    1400 This RN returned call to pt to inform her of the above. All questions answered. Pt states no further needs/concerns at this time.

## 2024-12-13 RX ORDER — FAMOTIDINE 20 MG/1
TABLET, FILM COATED ORAL
Qty: 180 TABLET | Refills: 3 | Status: ON HOLD | OUTPATIENT
Start: 2024-12-13

## 2024-12-13 RX ORDER — ATORVASTATIN CALCIUM 40 MG/1
40 TABLET, FILM COATED ORAL EVERY EVENING
Qty: 100 TABLET | Refills: 0 | Status: ON HOLD | OUTPATIENT
Start: 2024-12-13

## 2024-12-13 NOTE — TELEPHONE ENCOUNTER
Is the patient due for a refill? Yes    Was the patient seen the past year? No    Date of last office visit: 09.21.2023    Does the patient have an upcoming appointment?  No      Provider to refill: BE    Does the patient have [facility] and need 100-day supply? (This applies to ALL medications) Yes, quantity updated to 100 days

## 2024-12-19 ENCOUNTER — OFFICE VISIT (OUTPATIENT)
Dept: VASCULAR SURGERY | Facility: MEDICAL CENTER | Age: 69
End: 2024-12-19
Payer: MEDICARE

## 2024-12-19 VITALS
DIASTOLIC BLOOD PRESSURE: 54 MMHG | BODY MASS INDEX: 19.61 KG/M2 | HEIGHT: 66 IN | SYSTOLIC BLOOD PRESSURE: 88 MMHG | TEMPERATURE: 97.3 F | WEIGHT: 122 LBS | HEART RATE: 80 BPM | OXYGEN SATURATION: 98 %

## 2024-12-19 DIAGNOSIS — N18.6 END STAGE RENAL DISEASE ON DIALYSIS (HCC): ICD-10-CM

## 2024-12-19 DIAGNOSIS — Z99.2 END STAGE RENAL DISEASE ON DIALYSIS (HCC): ICD-10-CM

## 2024-12-19 PROCEDURE — 3078F DIAST BP <80 MM HG: CPT | Performed by: PHYSICIAN ASSISTANT

## 2024-12-19 PROCEDURE — 99024 POSTOP FOLLOW-UP VISIT: CPT | Performed by: PHYSICIAN ASSISTANT

## 2024-12-19 PROCEDURE — 3074F SYST BP LT 130 MM HG: CPT | Performed by: PHYSICIAN ASSISTANT

## 2024-12-19 ASSESSMENT — FIBROSIS 4 INDEX: FIB4 SCORE: 3.19

## 2024-12-19 NOTE — PROGRESS NOTES
"                 VASCULAR SURGERY                 Clinic Progress Note  _________________________________    Vitals for Today's Visit (12/19/2024)  BP (!) 88/54 (BP Location: Left arm, Patient Position: Sitting, BP Cuff Size: Adult)   Pulse 80   Temp 36.3 °C (97.3 °F) (Temporal)   Ht 1.676 m (5' 6\")   Wt 55.3 kg (122 lb)   LMP 04/29/2005 (LMP Unknown)   SpO2 98%   BMI 19.69 kg/m²   _________________________________      12/19/2024  Ms. Manuel presents today for a post operative appointment. She recently underwent ligation of LUE AVG on 12/5/2024 with Dr. Brown.   She underwent left upper arm dialysis graft placement using 4 to 7 mm step graft and peritoneal dialysis catheter removal on September 23, 2024. Her dialysis graft had been working well; however, she developed small nonhealing ulcers in her left hand and forearm, thus prompting ligation of AVG. She understands that she will be catheter dependent.   She has a right IJ permacath. She is on hemodialysis Monday, Wednesday, and Friday from 6 AM to 9 AM.     She is doing well today, reports significant improvement of the left finger and forearm wounds. She has been using gentamicin ointment prescribed by her PCP.  She notes no significant pain, numbness, or tingling in the left arm, hand or fingers.       Exam:  Pleasant female, no acute distress  Left arm AVG thrombosed, incision c/d/I  Left forearm with several small and scabbed over superficial ulcerations  Left fingers with several healing well superficial ulcerations, dry  Hands warm, left radial pulse palpable  Full sensation and motor in b/l hands       A/P:  ESRD   Arterial steal syndrome s/p LUE AVG  Currently undergoing HD via R IJ permacath    Ms. Manuel is doing well following her graft ligation. She understands that she will be catheter dependent. She will continue local wound care to the superficial ulcerations of the left forearm and fingers. She will see us back on an as needed basis. "       Geneva Muniz P.A.-C.  [facility]  [phone]  [address]  [address], [facility] [address]

## 2024-12-21 ENCOUNTER — APPOINTMENT (OUTPATIENT)
Dept: RADIOLOGY | Facility: MEDICAL CENTER | Age: 69
End: 2024-12-21
Attending: STUDENT IN AN ORGANIZED HEALTH CARE EDUCATION/TRAINING PROGRAM
Payer: MEDICARE

## 2024-12-21 ENCOUNTER — HOSPITAL ENCOUNTER (EMERGENCY)
Facility: MEDICAL CENTER | Age: 69
End: 2024-12-22
Attending: STUDENT IN AN ORGANIZED HEALTH CARE EDUCATION/TRAINING PROGRAM
Payer: MEDICARE

## 2024-12-21 DIAGNOSIS — R91.1 PULMONARY NODULE: ICD-10-CM

## 2024-12-21 DIAGNOSIS — Z99.2 ESRD (END STAGE RENAL DISEASE) ON DIALYSIS (HCC): ICD-10-CM

## 2024-12-21 DIAGNOSIS — R73.9 HYPERGLYCEMIA: ICD-10-CM

## 2024-12-21 DIAGNOSIS — N18.6 ESRD (END STAGE RENAL DISEASE) ON DIALYSIS (HCC): ICD-10-CM

## 2024-12-21 DIAGNOSIS — M54.9 ACUTE ON CHRONIC BACK PAIN: Primary | ICD-10-CM

## 2024-12-21 DIAGNOSIS — G89.29 ACUTE ON CHRONIC BACK PAIN: Primary | ICD-10-CM

## 2024-12-21 LAB
ALBUMIN SERPL BCP-MCNC: 3.1 G/DL (ref 3.2–4.9)
ALBUMIN/GLOB SERPL: 0.8 G/DL
ALP SERPL-CCNC: 681 U/L (ref 30–99)
ALT SERPL-CCNC: 26 U/L (ref 2–50)
ANION GAP SERPL CALC-SCNC: 18 MMOL/L (ref 7–16)
AST SERPL-CCNC: 37 U/L (ref 12–45)
BASOPHILS # BLD AUTO: 0.9 % (ref 0–1.8)
BASOPHILS # BLD: 0.11 K/UL (ref 0–0.12)
BILIRUB SERPL-MCNC: 1 MG/DL (ref 0.1–1.5)
BUN SERPL-MCNC: 36 MG/DL (ref 8–22)
CALCIUM ALBUM COR SERPL-MCNC: 8.8 MG/DL (ref 8.5–10.5)
CALCIUM SERPL-MCNC: 8.1 MG/DL (ref 8.4–10.2)
CHLORIDE SERPL-SCNC: 87 MMOL/L (ref 96–112)
CO2 SERPL-SCNC: 18 MMOL/L (ref 20–33)
CREAT SERPL-MCNC: 5.53 MG/DL (ref 0.5–1.4)
EOSINOPHIL # BLD AUTO: 0.03 K/UL (ref 0–0.51)
EOSINOPHIL NFR BLD: 0.2 % (ref 0–6.9)
ERYTHROCYTE [DISTWIDTH] IN BLOOD BY AUTOMATED COUNT: 51.8 FL (ref 35.9–50)
GFR SERPLBLD CREATININE-BSD FMLA CKD-EPI: 8 ML/MIN/1.73 M 2
GLOBULIN SER CALC-MCNC: 4.1 G/DL (ref 1.9–3.5)
GLUCOSE BLD STRIP.AUTO-MCNC: 409 MG/DL (ref 65–99)
GLUCOSE SERPL-MCNC: 418 MG/DL (ref 65–99)
HCT VFR BLD AUTO: 33.1 % (ref 37–47)
HGB BLD-MCNC: 10.8 G/DL (ref 12–16)
IMM GRANULOCYTES # BLD AUTO: 0.14 K/UL (ref 0–0.11)
IMM GRANULOCYTES NFR BLD AUTO: 1.1 % (ref 0–0.9)
LYMPHOCYTES # BLD AUTO: 0.82 K/UL (ref 1–4.8)
LYMPHOCYTES NFR BLD: 6.6 % (ref 22–41)
MAGNESIUM SERPL-MCNC: 2 MG/DL (ref 1.5–2.5)
MCH RBC QN AUTO: 31.1 PG (ref 27–33)
MCHC RBC AUTO-ENTMCNC: 32.6 G/DL (ref 32.2–35.5)
MCV RBC AUTO: 95.4 FL (ref 81.4–97.8)
MONOCYTES # BLD AUTO: 1.52 K/UL (ref 0–0.85)
MONOCYTES NFR BLD AUTO: 12.2 % (ref 0–13.4)
NEUTROPHILS # BLD AUTO: 9.89 K/UL (ref 1.82–7.42)
NEUTROPHILS NFR BLD: 79 % (ref 44–72)
NRBC # BLD AUTO: 0 K/UL
NRBC BLD-RTO: 0 /100 WBC (ref 0–0.2)
PHOSPHATE SERPL-MCNC: 5 MG/DL (ref 2.5–4.5)
PLATELET # BLD AUTO: 189 K/UL (ref 164–446)
PMV BLD AUTO: 10.5 FL (ref 9–12.9)
POTASSIUM SERPL-SCNC: 4.7 MMOL/L (ref 3.6–5.5)
PROT SERPL-MCNC: 7.2 G/DL (ref 6–8.2)
RBC # BLD AUTO: 3.47 M/UL (ref 4.2–5.4)
SODIUM SERPL-SCNC: 123 MMOL/L (ref 135–145)
TROPONIN T SERPL-MCNC: 124 NG/L (ref 6–19)
WBC # BLD AUTO: 12.5 K/UL (ref 4.8–10.8)

## 2024-12-21 PROCEDURE — 96372 THER/PROPH/DIAG INJ SC/IM: CPT

## 2024-12-21 PROCEDURE — 99285 EMERGENCY DEPT VISIT HI MDM: CPT

## 2024-12-21 PROCEDURE — 36415 COLL VENOUS BLD VENIPUNCTURE: CPT

## 2024-12-21 PROCEDURE — A9270 NON-COVERED ITEM OR SERVICE: HCPCS | Performed by: STUDENT IN AN ORGANIZED HEALTH CARE EDUCATION/TRAINING PROGRAM

## 2024-12-21 PROCEDURE — 83735 ASSAY OF MAGNESIUM: CPT

## 2024-12-21 PROCEDURE — 85025 COMPLETE CBC W/AUTO DIFF WBC: CPT

## 2024-12-21 PROCEDURE — 700102 HCHG RX REV CODE 250 W/ 637 OVERRIDE(OP): Performed by: STUDENT IN AN ORGANIZED HEALTH CARE EDUCATION/TRAINING PROGRAM

## 2024-12-21 PROCEDURE — 82962 GLUCOSE BLOOD TEST: CPT | Mod: 91

## 2024-12-21 PROCEDURE — 700101 HCHG RX REV CODE 250: Performed by: STUDENT IN AN ORGANIZED HEALTH CARE EDUCATION/TRAINING PROGRAM

## 2024-12-21 PROCEDURE — 96375 TX/PRO/DX INJ NEW DRUG ADDON: CPT

## 2024-12-21 PROCEDURE — 80053 COMPREHEN METABOLIC PANEL: CPT

## 2024-12-21 PROCEDURE — 93005 ELECTROCARDIOGRAM TRACING: CPT | Mod: TC | Performed by: STUDENT IN AN ORGANIZED HEALTH CARE EDUCATION/TRAINING PROGRAM

## 2024-12-21 PROCEDURE — 84484 ASSAY OF TROPONIN QUANT: CPT

## 2024-12-21 PROCEDURE — 71045 X-RAY EXAM CHEST 1 VIEW: CPT

## 2024-12-21 PROCEDURE — 700111 HCHG RX REV CODE 636 W/ 250 OVERRIDE (IP): Performed by: STUDENT IN AN ORGANIZED HEALTH CARE EDUCATION/TRAINING PROGRAM

## 2024-12-21 PROCEDURE — 96374 THER/PROPH/DIAG INJ IV PUSH: CPT

## 2024-12-21 PROCEDURE — 84100 ASSAY OF PHOSPHORUS: CPT

## 2024-12-21 RX ORDER — ONDANSETRON 2 MG/ML
4 INJECTION INTRAMUSCULAR; INTRAVENOUS ONCE
Status: COMPLETED | OUTPATIENT
Start: 2024-12-21 | End: 2024-12-21

## 2024-12-21 RX ORDER — HYDROMORPHONE HYDROCHLORIDE 1 MG/ML
0.5 INJECTION, SOLUTION INTRAMUSCULAR; INTRAVENOUS; SUBCUTANEOUS ONCE
Status: COMPLETED | OUTPATIENT
Start: 2024-12-21 | End: 2024-12-21

## 2024-12-21 RX ORDER — LIDOCAINE 4 G/G
1 PATCH TOPICAL ONCE
Status: DISCONTINUED | OUTPATIENT
Start: 2024-12-21 | End: 2024-12-22 | Stop reason: HOSPADM

## 2024-12-21 RX ORDER — INSULIN LISPRO 100 [IU]/ML
10 INJECTION, SOLUTION INTRAVENOUS; SUBCUTANEOUS ONCE
Status: COMPLETED | OUTPATIENT
Start: 2024-12-21 | End: 2024-12-21

## 2024-12-21 RX ORDER — ACETAMINOPHEN 500 MG
1000 TABLET ORAL ONCE
Status: COMPLETED | OUTPATIENT
Start: 2024-12-21 | End: 2024-12-21

## 2024-12-21 RX ADMIN — HYDROMORPHONE HYDROCHLORIDE 0.5 MG: 1 INJECTION, SOLUTION INTRAMUSCULAR; INTRAVENOUS; SUBCUTANEOUS at 21:53

## 2024-12-21 RX ADMIN — ONDANSETRON 4 MG: 2 INJECTION INTRAMUSCULAR; INTRAVENOUS at 21:53

## 2024-12-21 RX ADMIN — INSULIN LISPRO 10 UNITS: 100 INJECTION, SOLUTION INTRAVENOUS; SUBCUTANEOUS at 23:46

## 2024-12-21 RX ADMIN — ACETAMINOPHEN 1000 MG: 500 TABLET ORAL at 23:40

## 2024-12-21 RX ADMIN — LIDOCAINE 1 PATCH: 4 PATCH TOPICAL at 23:40

## 2024-12-22 VITALS
TEMPERATURE: 97.9 F | OXYGEN SATURATION: 98 % | DIASTOLIC BLOOD PRESSURE: 65 MMHG | HEART RATE: 92 BPM | RESPIRATION RATE: 20 BRPM | SYSTOLIC BLOOD PRESSURE: 142 MMHG

## 2024-12-22 LAB
GLUCOSE BLD STRIP.AUTO-MCNC: 354 MG/DL (ref 65–99)
GLUCOSE BLD STRIP.AUTO-MCNC: 448 MG/DL (ref 65–99)
TROPONIN T SERPL-MCNC: 123 NG/L (ref 6–19)

## 2024-12-22 PROCEDURE — 96376 TX/PRO/DX INJ SAME DRUG ADON: CPT

## 2024-12-22 PROCEDURE — 82962 GLUCOSE BLOOD TEST: CPT

## 2024-12-22 PROCEDURE — 72128 CT CHEST SPINE W/O DYE: CPT

## 2024-12-22 PROCEDURE — 71275 CT ANGIOGRAPHY CHEST: CPT

## 2024-12-22 PROCEDURE — 84484 ASSAY OF TROPONIN QUANT: CPT

## 2024-12-22 PROCEDURE — 700111 HCHG RX REV CODE 636 W/ 250 OVERRIDE (IP): Performed by: STUDENT IN AN ORGANIZED HEALTH CARE EDUCATION/TRAINING PROGRAM

## 2024-12-22 PROCEDURE — 700117 HCHG RX CONTRAST REV CODE 255: Performed by: STUDENT IN AN ORGANIZED HEALTH CARE EDUCATION/TRAINING PROGRAM

## 2024-12-22 RX ORDER — HYDROMORPHONE HYDROCHLORIDE 1 MG/ML
0.5 INJECTION, SOLUTION INTRAMUSCULAR; INTRAVENOUS; SUBCUTANEOUS ONCE
Status: COMPLETED | OUTPATIENT
Start: 2024-12-22 | End: 2024-12-22

## 2024-12-22 RX ADMIN — IOHEXOL 80 ML: 350 INJECTION, SOLUTION INTRAVENOUS at 01:11

## 2024-12-22 RX ADMIN — HYDROMORPHONE HYDROCHLORIDE 0.5 MG: 1 INJECTION, SOLUTION INTRAMUSCULAR; INTRAVENOUS; SUBCUTANEOUS at 00:42

## 2024-12-22 NOTE — ED NOTES
Patient is stable for d/c at this time, anticipatory guidance provided, close follow-up encouraged, and ED return instructions have been detailed. Patient and family are both agreeable to the disposition, and plan and discharged home in ambulatory state and good condition.   Yes

## 2024-12-22 NOTE — ED PROVIDER NOTES
"ED Provider Note    CHIEF COMPLAINT  Chief Complaint   Patient presents with    Back Pain     Pt c/o back pain and shoulder pain. When asked what was wrong pt responded, \"help me!\" And stated she was in pain       EXTERNAL RECORDS REVIEWED  Outpatient Notes patient was seen by vascular medicine 12/19/2024 for postoperative appointment as she recently underwent ligation of left upper extremity AV graft as she developed small nonhealing ulcers to the left hand.  She now has a right IJ permacath.  She is on HD Monday, Wednesday, Friday    HPI/ROS  LIMITATION TO HISTORY   Select: : None  OUTSIDE HISTORIAN(S):  None    Anu Manuel is a 69 y.o. female with history of type I insulin, ESRD on HD Monday, Wednesday, Friday who presents for evaluation of upper back pain.  She describes the pain as a knife going through her back.  It is difficult for her to say how long she has had this pain.  She states that she has had this pain similar for a while but is never been this bad.  She said that it got worse last night after she was helping her friend hang up curtains.  She denies any direct blows to her back.  She denies any shortness of breath or new cough.  She has no fever.  She has no anterior chest pain.  She has no abdominal pain, nausea, vomiting, diarrhea.  Her last dialysis on Friday.  She scheduled to go to dialysis on Monday. No bowel/bladder issues or numbness/paresthesias although she does not make urine.     PAST MEDICAL HISTORY   has a past medical history of Accidental drug overdose (08/27/2012), Adverse effect of anesthesia, Anemia, Anesthesia, Arrhythmia, Arthritis, Bowel habit changes (More frequent), Breath shortness, Broken hip (HCC) (04/2024), Cataract (2022), Cigarette smoker (quit 2013), Dental disorder, depression (08/30/2016), Diabetes mellitus type 1 ([facility]) (1989), Dialysis patient ([facility]) (Short time due to a kidney injury from a infection), Emphysema of lung ([facility]), Encounter for long-term " (current) use of insulin (HCC) (09/25/2013), GI bleed, Heart burn, High cholesterol, Hypertension, Hypothyroidism, postsurgical (1970), Indigestion, Infectious disease, Jaundice (2021 Liver disease), Joint replacement, Liver disease, Liver failure (HCC), Myocardial infarct (HCC) (2019), Pain, Polyneuropathy in diabetes(357.2) (06/10/2015), Renal disorder, Status post appendectomy, 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    SURGICAL HISTORY   has a past surgical history that includes hysterectomy, vaginal (2006); thyroid lobectomy (1973); lumpectomy (1976, 2005); cervical disk and fusion anterior (03/12/2008); tonsillectomy (1963); cervical fusion posterior (01/16/2009); hardware removal neuro (01/16/2009); neck exploration (01/16/2009); lumpectomy; lumbar laminectomy diskectomy (Right, 05/10/2016); shoulder decompression arthroscopic (06/17/2008); clavicle distal excision (06/17/2008); shoulder arthroscopy w/ rotator cuff repair (10/09/2008); njx aa&/strd tfrml epi lumbar/sacral 1 level (Right, 08/31/2016); spinal cord stimulator (N/A, 10/26/2018); thoracic laminectomy (N/A, 10/26/2018); appendectomy (2004); ins/rplc spi npg/rcvr pocket (N/A, 12/16/2019); irrigation & debridement neuro (01/19/2020); cath placement (01/25/2020); ercp,diagnostic (N/A, 10/26/2021); upper gi endoscopy,biopsy (N/A, 10/26/2021); upper gi endoscopy,diagnosis (N/A, 10/26/2021); peter by laparoscopy (N/A, 10/28/2021); ercp,diagnostic (N/A, 01/14/2022); other cardiac surgery (2020 stents inserted); other abdominal surgery (2004); lap insert intraperitoneal catheter (06/20/2024); cataract phaco with iol; pb remove perm cannula/catheter (09/23/2024); av fistula creation (Left, 09/23/2024); and av fistula revision (Left, 12/5/2024).    FAMILY HISTORY  Family History   Problem Relation Age of Onset    Hypertension Mother     Cancer Father        SOCIAL  HISTORY  Social History     Tobacco Use    Smoking status: Every Day     Current packs/day: 0.50     Average packs/day: 0.5 packs/day for 30.0 years (15.0 ttl pk-yrs)     Types: Cigarettes    Smokeless tobacco: Never    Tobacco comments:     Currently smokes 1/2 - 3/4 a pack daily.  Has smoked for about 50 years.   Vaping Use    Vaping status: Never Used   Substance and Sexual Activity    Alcohol use: Not Currently    Drug use: Not Currently     Types: Inhaled, Oral, Marijuana     Comment: Marijuana - Occasional; edibles    Sexual activity: Not Currently       CURRENT MEDICATIONS  Home Medications       Reviewed by Hossein Vega R.N. (Registered Nurse) on 12/21/24 at 2125  Med List Status: Not Addressed     Medication Last Dose Status   amLODIPine (NORVASC) 10 MG Tab  Active   atorvastatin (LIPITOR) 40 MG Tab  Active   Continuous Glucose Sensor (FREESTYLE PARISA 3 PLUS SENSOR) Misc  Active   Cyanocobalamin (VITAMIN B-12 PO)  Active   famotidine (PEPCID) 20 MG Tab  Active   gabapentin (NEURONTIN) 100 MG Cap  Active   Insulin Glargine, 1 Unit Dial, (TOUJEO SOLOSTAR) 300 UNIT/ML Solution Pen-injector  Active   Insulin Lispro-aabc, 1 U Dial, (LYUMJEV KWIKPEN) 100 UNIT/ML Solution Pen-injector  Active   lidocaine-prilocaine (EMLA) 2.5-2.5 % Cream  Active   liothyronine (CYTOMEL) 5 MCG Tab  Active   metoprolol SR (TOPROL XL) 100 MG TABLET SR 24 HR  Active   omeprazole (PRILOSEC) 40 MG delayed-release capsule  Active   ondansetron (ZOFRAN ODT) 8 MG TABLET DISPERSIBLE  Active   oxycodone (OXY-IR) 15 MG immediate release tablet  Active   riFAMPin (RIFADINE) 300 MG Cap  Active   senna-docusate (SENOKOT S) 8.6-50 MG Tab  Active   SYNTHROID 125 MCG Tab  Active   traZODone (DESYREL) 150 MG Tab  Active   ursodiol (ACTIGALL) 300 MG Cap  Active   venlafaxine (EFFEXOR-XR) 150 MG extended-release capsule  Active   VITAMIN D, ERGOCALCIFEROL, PO  Active                    ALLERGIES  Allergies   Allergen Reactions    Tape Unspecified and  Rash     Blisters, paper tape is ok  Other reaction(s): Rash       PHYSICAL EXAM  VITAL SIGNS: BP (!) 145/70   Pulse 99   Temp 36.6 °C (97.9 °F) (Temporal)   Resp 20   LMP 04/29/2005 (LMP Unknown)   SpO2 93%      Constitutional: Alert, moaning in  pain  HEENT: Normocephalic, Atraumatic,  external ears normal, pharynx pink,  Mucous  Membranes moist, No rhinorrhea or mucosal edema  Eyes: PERRL, EOMI, Conjunctiva normal, No discharge.   Neck: Normal range of motion, No tenderness, Supple, No stridor.   Cardiovascular: Regular Rate and Rhythm, No murmurs,  rubs, or gallops.   Thorax & Lungs: Lungs clear to auscultation bilaterally, No respiratory distress, No wheezes, rhales or rhonchi, No chest wall tenderness. Right IJ Permacath in place  Abdomen: Soft, non tender, non distended,  No pulsatile masses., no rebound guarding or peritoneal signs.   Skin: Warm, Dry, No erythema, No rash,   Back:  + midline T spine tenderness, otherwise spine nontender  Extremities: Equal, intact distal pulses, No cyanosis, clubbing or edema,  No tenderness.   Musculoskeletal: Good range of motion in all major joints. No tenderness to palpation or major deformities noted.   Neurologic: Alert & oriented No focal deficits noted.  Psychiatric: Anxious      EKG/LABS  Results for orders placed or performed during the hospital encounter of 12/21/24   CBC WITH DIFFERENTIAL    Collection Time: 12/21/24 10:22 PM   Result Value Ref Range    WBC 12.5 (H) 4.8 - 10.8 K/uL    RBC 3.47 (L) 4.20 - 5.40 M/uL    Hemoglobin 10.8 (L) 12.0 - 16.0 g/dL    Hematocrit 33.1 (L) 37.0 - 47.0 %    MCV 95.4 81.4 - 97.8 fL    MCH 31.1 27.0 - 33.0 pg    MCHC 32.6 32.2 - 35.5 g/dL    RDW 51.8 (H) 35.9 - 50.0 fL    Platelet Count 189 164 - 446 K/uL    MPV 10.5 9.0 - 12.9 fL    Neutrophils-Polys 79.00 (H) 44.00 - 72.00 %    Lymphocytes 6.60 (L) 22.00 - 41.00 %    Monocytes 12.20 0.00 - 13.40 %    Eosinophils 0.20 0.00 - 6.90 %    Basophils 0.90 0.00 - 1.80 %     Immature Granulocytes 1.10 (H) 0.00 - 0.90 %    Nucleated RBC 0.00 0.00 - 0.20 /100 WBC    Neutrophils (Absolute) 9.89 (H) 1.82 - 7.42 K/uL    Lymphs (Absolute) 0.82 (L) 1.00 - 4.80 K/uL    Monos (Absolute) 1.52 (H) 0.00 - 0.85 K/uL    Eos (Absolute) 0.03 0.00 - 0.51 K/uL    Baso (Absolute) 0.11 0.00 - 0.12 K/uL    Immature Granulocytes (abs) 0.14 (H) 0.00 - 0.11 K/uL    NRBC (Absolute) 0.00 K/uL   COMP METABOLIC PANEL    Collection Time: 12/21/24 10:22 PM   Result Value Ref Range    Sodium 123 (L) 135 - 145 mmol/L    Potassium 4.7 3.6 - 5.5 mmol/L    Chloride 87 (L) 96 - 112 mmol/L    Co2 18 (L) 20 - 33 mmol/L    Anion Gap 18.0 (H) 7.0 - 16.0    Glucose 418 (H) 65 - 99 mg/dL    Bun 36 (H) 8 - 22 mg/dL    Creatinine 5.53 (HH) 0.50 - 1.40 mg/dL    Calcium 8.1 (L) 8.4 - 10.2 mg/dL    Correct Calcium 8.8 8.5 - 10.5 mg/dL    AST(SGOT) 37 12 - 45 U/L    ALT(SGPT) 26 2 - 50 U/L    Alkaline Phosphatase 681 (H) 30 - 99 U/L    Total Bilirubin 1.0 0.1 - 1.5 mg/dL    Albumin 3.1 (L) 3.2 - 4.9 g/dL    Total Protein 7.2 6.0 - 8.2 g/dL    Globulin 4.1 (H) 1.9 - 3.5 g/dL    A-G Ratio 0.8 g/dL   MAGNESIUM    Collection Time: 12/21/24 10:22 PM   Result Value Ref Range    Magnesium 2.0 1.5 - 2.5 mg/dL   PHOSPHORUS    Collection Time: 12/21/24 10:22 PM   Result Value Ref Range    Phosphorus 5.0 (H) 2.5 - 4.5 mg/dL   TROPONIN    Collection Time: 12/21/24 10:22 PM   Result Value Ref Range    Troponin T 124 (H) 6 - 19 ng/L   ESTIMATED GFR    Collection Time: 12/21/24 10:22 PM   Result Value Ref Range    GFR (CKD-EPI) 8 (A) >60 mL/min/1.73 m 2   POCT glucose device results    Collection Time: 12/21/24 11:21 PM   Result Value Ref Range    POC Glucose, Blood 409 (HH) 65 - 99 mg/dL   POCT glucose device results    Collection Time: 12/21/24 11:56 PM   Result Value Ref Range    POC Glucose, Blood 448 (HH) 65 - 99 mg/dL   TROPONIN    Collection Time: 12/22/24 12:00 AM   Result Value Ref Range    Troponin T 123 (H) 6 - 19 ng/L   POCT glucose  device results    Collection Time: 12/22/24 12:34 AM   Result Value Ref Range    POC Glucose, Blood 354 (H) 65 - 99 mg/dL     *Note: Due to a large number of results and/or encounters for the requested time period, some results have not been displayed. A complete set of results can be found in Results Review.       I have independently interpreted this EKG    RADIOLOGY/PROCEDURES   I have independently interpreted the diagnostic imaging associated with this visit and am waiting the final reading from the radiologist.   My preliminary interpretation is as follows: no aortic dissection    Radiologist interpretation:  CT-TSPINE W/O PLUS RECONS   Final Result         1.  No acute traumatic bony injury of the thoracic spine.   2.  Extensive degenerative disc disease at C7/T1 with vertebral body height loss, stable since CT chest December 28, 2022   3.  Atherosclerosis and atherosclerotic coronary artery disease      CT-CTA COMPLETE THORACOABDOMINAL AORTA   Final Result         1.  No aortic aneurysm or dissection identified.   2.  Atherosclerosis and atherosclerotic coronary artery disease   3.  Common bile duct dilatation, commonly associated with postcholecystectomy physiology, consider causes of biliary obstruction with additional workup as clinically appropriate.   4.  Hepatomegaly   5.  Irregular hepatic contour favoring changes of cirrhosis   6.  Pulmonary nodule, see nodule follow-up recommendations below.      Fleischner Society pulmonary nodule recommendations:   Low Risk: CT at 6-12 months, then consider CT at 18-24 months      High Risk: CT at 6-12 months, then CT at 18-24 months      Low Risk - Minimal or absent history of smoking and of other known risk factors.      High Risk - History of smoking or of other known risk factors.      Note: These recommendations do not apply to lung cancer screening, patients with immunosuppression, or patients with known primary cancer.      Fleischner Society 2017  Guidelines for Management of Incidentally Detected Pulmonary Nodules in Adults                           DX-CHEST-PORTABLE (1 VIEW)   Final Result         1.  No acute cardiopulmonary disease.          COURSE & MEDICAL DECISION MAKING    ASSESSMENT, COURSE AND PLAN  Care Narrative:   This is a 69-year-old female with history of ESRD on HD Monday, Wednesday, Friday, type 1 diabetes who is presenting for evaluation of upper back pain.  Patient has history of chronic back pain.  She states that she was helping her friend hang something which made the pain worse.  She denies any numbness, tingling, weakness, bowel incontinence therefore doubt cauda equina.  There is no direct blows to her back.  Patient came by EMS.    Considered compression fracture.  CT scan of the T-spine was obtained and there is no evidence of fracture but she does have severe degenerative disc disease.  I also considered ACS and EKG was obtained and shows no evidence of acute ischemic changes.  Her troponin is elevated to 123 but delta troponin is 124.  I suspect that her elevation in troponin is secondary to her CKD.  The pain is not exertional.  She is hyperglycemic with blood glucose 418.  I did give her insulin.  She does have pseudohyponatremia with corrected sodium 128 but otherwise there is no emergent need for dialysis at this point.  She does have mild leukocytosis 12,500.  Mild anemia is noted with hemoglobin 10.8 which has been noted before.  Chest x-ray with no signs of pneumonia.  CTA of the chest obtained shows no evidence of aortic dissection.  Incidental findings were discussed with the patient and she is already aware of the pulmonary nodule.  She will follow-up with her primary care doctor.    2:39 AM Patient reevaluated and we discussed results.  Discussed with patient that no emergent etiology of her pain is found.  Patient states that she does have chronic back pain as well as scoliosis.  At this time, she is comfortable  discharge home.  She will come back for uncontrolled pain, fever, any other concerns.  She is agreeable to discharge plan with no further questions. She is scheduled for HD tomorrow.            ADDITIONAL PROBLEMS MANAGED  ESRD on HD - no emergent needs for dialysis  Type 1 diabetes - treated with subcutaneous insulin    DISPOSITION AND DISCUSSIONS  I have discussed management of the patient with the following physicians and MILAN's:  none    Discussion of management with other QHP or appropriate source(s): None     Escalation of care considered, and ultimately not performed:acute inpatient care management, however at this time, the patient is most appropriate for outpatient management    Barriers to care at this time, including but not limited to:  None .     Decision tools and prescription drugs considered including, but not limited to:  None .     The patient will return for new or worsening symptoms and is stable at the time of discharge.    The patient is referred to a primary physician for blood pressure management, diabetic screening, and for all other preventative health concerns.      DISPOSITION:  Patient will be discharged home in stable condition.    FOLLOW UP:  MILENA [email or web address].  [address]  [facility] [address]  [phone]          [facility], Emergency Dept  [address]  [facility] [id number] [phone]          OUTPATIENT MEDICATIONS:  New Prescriptions    No medications on file       FINAL DIAGNOSIS  1. Acute on chronic back pain Acute   2. ESRD (end stage renal disease) on dialysis (HCC) Acute   3. Hyperglycemia Acute   4. Pulmonary nodule Acute        Electronically signed by: Mahsa Hitchcock M.D., 12/21/2024 9:29 PM

## 2024-12-22 NOTE — DISCHARGE INSTRUCTIONS
You were seen for evaluation of back pain.  There is no evidence of fracture of your thoracic spine.  There is also no evidence of aortic dissection.  It is unclear what is causing your symptoms but it may be related to your chronic back pain.  Monitor your symptoms closely.  Return if you have any uncontrolled pain, fevers or any other concerns.    Please discuss the following findings with your regular doctor:  CT-TSPINE W/O PLUS RECONS   Final Result         1.  No acute traumatic bony injury of the thoracic spine.   2.  Extensive degenerative disc disease at C7/T1 with vertebral body height loss, stable since CT chest December 28, 2022   3.  Atherosclerosis and atherosclerotic coronary artery disease      CT-CTA COMPLETE THORACOABDOMINAL AORTA   Final Result         1.  No aortic aneurysm or dissection identified.   2.  Atherosclerosis and atherosclerotic coronary artery disease   3.  Common bile duct dilatation, commonly associated with postcholecystectomy physiology, consider causes of biliary obstruction with additional workup as clinically appropriate.   4.  Hepatomegaly   5.  Irregular hepatic contour favoring changes of cirrhosis   6.  Pulmonary nodule, see nodule follow-up recommendations below.      Fleischner Society pulmonary nodule recommendations:   Low Risk: CT at 6-12 months, then consider CT at 18-24 months      High Risk: CT at 6-12 months, then CT at 18-24 months      Low Risk - Minimal or absent history of smoking and of other known risk factors.      High Risk - History of smoking or of other known risk factors.      Note: These recommendations do not apply to lung cancer screening, patients with immunosuppression, or patients with known primary cancer.      Fleischner Society 2017 Guidelines for Management of Incidentally Detected Pulmonary Nodules in Adults                           DX-CHEST-PORTABLE (1 VIEW)   Final Result         1.  No acute cardiopulmonary disease.        Labs Reviewed    CBC WITH DIFFERENTIAL - Abnormal; Notable for the following components:       Result Value    WBC 12.5 (*)     RBC 3.47 (*)     Hemoglobin 10.8 (*)     Hematocrit 33.1 (*)     RDW 51.8 (*)     Neutrophils-Polys 79.00 (*)     Lymphocytes 6.60 (*)     Immature Granulocytes 1.10 (*)     Neutrophils (Absolute) 9.89 (*)     Lymphs (Absolute) 0.82 (*)     Monos (Absolute) 1.52 (*)     Immature Granulocytes (abs) 0.14 (*)     All other components within normal limits   COMP METABOLIC PANEL - Abnormal; Notable for the following components:    Sodium 123 (*)     Chloride 87 (*)     Co2 18 (*)     Anion Gap 18.0 (*)     Glucose 418 (*)     Bun 36 (*)     Creatinine 5.53 (*)     Calcium 8.1 (*)     Alkaline Phosphatase 681 (*)     Albumin 3.1 (*)     Globulin 4.1 (*)     All other components within normal limits   PHOSPHORUS - Abnormal; Notable for the following components:    Phosphorus 5.0 (*)     All other components within normal limits   TROPONIN - Abnormal; Notable for the following components:    Troponin T 124 (*)     All other components within normal limits   ESTIMATED GFR - Abnormal; Notable for the following components:    GFR (CKD-EPI) 8 (*)     All other components within normal limits   TROPONIN - Abnormal; Notable for the following components:    Troponin T 123 (*)     All other components within normal limits   POCT GLUCOSE DEVICE RESULTS - Abnormal; Notable for the following components:    POC Glucose, Blood 409 (*)     All other components within normal limits   POCT GLUCOSE DEVICE RESULTS - Abnormal; Notable for the following components:    POC Glucose, Blood 448 (*)     All other components within normal limits   POCT GLUCOSE DEVICE RESULTS - Abnormal; Notable for the following components:    POC Glucose, Blood 354 (*)     All other components within normal limits   MAGNESIUM

## 2024-12-22 NOTE — ED TRIAGE NOTES
"Pt BIB EMS for severe back pain and shoulder pain  Chief Complaint   Patient presents with    Back Pain     Pt c/o back pain and shoulder pain. When asked what was wrong pt responded, \"help me!\" And stated she was in pain     BP (!) 145/70   Pulse 99   Temp 36.6 °C (97.9 °F) (Temporal)   Resp 20   LMP 04/29/2005 (LMP Unknown)   SpO2 93%     "

## 2024-12-23 ENCOUNTER — HOSPITAL ENCOUNTER (EMERGENCY)
Facility: MEDICAL CENTER | Age: 69
End: 2024-12-23
Attending: EMERGENCY MEDICINE
Payer: MEDICARE

## 2024-12-23 ENCOUNTER — APPOINTMENT (OUTPATIENT)
Dept: RADIOLOGY | Facility: MEDICAL CENTER | Age: 69
End: 2024-12-23
Attending: EMERGENCY MEDICINE
Payer: MEDICARE

## 2024-12-23 VITALS
OXYGEN SATURATION: 96 % | WEIGHT: 126.76 LBS | HEART RATE: 95 BPM | BODY MASS INDEX: 20.46 KG/M2 | RESPIRATION RATE: 19 BRPM | TEMPERATURE: 97.1 F | DIASTOLIC BLOOD PRESSURE: 61 MMHG | SYSTOLIC BLOOD PRESSURE: 131 MMHG

## 2024-12-23 DIAGNOSIS — K92.0 COFFEE GROUND EMESIS: ICD-10-CM

## 2024-12-23 DIAGNOSIS — K59.00 CONSTIPATION, UNSPECIFIED CONSTIPATION TYPE: ICD-10-CM

## 2024-12-23 DIAGNOSIS — R11.2 NAUSEA AND VOMITING, UNSPECIFIED VOMITING TYPE: ICD-10-CM

## 2024-12-23 LAB
ALBUMIN SERPL BCP-MCNC: 3.1 G/DL (ref 3.2–4.9)
ALBUMIN/GLOB SERPL: 0.7 G/DL
ALP SERPL-CCNC: 668 U/L (ref 30–99)
ALT SERPL-CCNC: 22 U/L (ref 2–50)
ANION GAP SERPL CALC-SCNC: 24 MMOL/L (ref 7–16)
AST SERPL-CCNC: 25 U/L (ref 12–45)
BASOPHILS # BLD AUTO: 0.6 % (ref 0–1.8)
BASOPHILS # BLD: 0.06 K/UL (ref 0–0.12)
BILIRUB SERPL-MCNC: 0.9 MG/DL (ref 0.1–1.5)
BUN SERPL-MCNC: 21 MG/DL (ref 8–22)
CALCIUM ALBUM COR SERPL-MCNC: 9.9 MG/DL (ref 8.5–10.5)
CALCIUM SERPL-MCNC: 9.2 MG/DL (ref 8.4–10.2)
CHLORIDE SERPL-SCNC: 89 MMOL/L (ref 96–112)
CO2 SERPL-SCNC: 20 MMOL/L (ref 20–33)
CREAT SERPL-MCNC: 3.75 MG/DL (ref 0.5–1.4)
EOSINOPHIL # BLD AUTO: 0.02 K/UL (ref 0–0.51)
EOSINOPHIL NFR BLD: 0.2 % (ref 0–6.9)
ERYTHROCYTE [DISTWIDTH] IN BLOOD BY AUTOMATED COUNT: 51.9 FL (ref 35.9–50)
GFR SERPLBLD CREATININE-BSD FMLA CKD-EPI: 13 ML/MIN/1.73 M 2
GLOBULIN SER CALC-MCNC: 4.5 G/DL (ref 1.9–3.5)
GLUCOSE SERPL-MCNC: 259 MG/DL (ref 65–99)
HCT VFR BLD AUTO: 35.9 % (ref 37–47)
HGB BLD-MCNC: 11.8 G/DL (ref 12–16)
IMM GRANULOCYTES # BLD AUTO: 0.12 K/UL (ref 0–0.11)
IMM GRANULOCYTES NFR BLD AUTO: 1.1 % (ref 0–0.9)
LIPASE SERPL-CCNC: 7 U/L (ref 11–82)
LYMPHOCYTES # BLD AUTO: 0.96 K/UL (ref 1–4.8)
LYMPHOCYTES NFR BLD: 9.1 % (ref 22–41)
MCH RBC QN AUTO: 30.8 PG (ref 27–33)
MCHC RBC AUTO-ENTMCNC: 32.9 G/DL (ref 32.2–35.5)
MCV RBC AUTO: 93.7 FL (ref 81.4–97.8)
MONOCYTES # BLD AUTO: 1.27 K/UL (ref 0–0.85)
MONOCYTES NFR BLD AUTO: 12 % (ref 0–13.4)
NEUTROPHILS # BLD AUTO: 8.11 K/UL (ref 1.82–7.42)
NEUTROPHILS NFR BLD: 77 % (ref 44–72)
NRBC # BLD AUTO: 0.02 K/UL
NRBC BLD-RTO: 0.2 /100 WBC (ref 0–0.2)
PLATELET # BLD AUTO: 217 K/UL (ref 164–446)
PMV BLD AUTO: 9.9 FL (ref 9–12.9)
POTASSIUM SERPL-SCNC: 4.3 MMOL/L (ref 3.6–5.5)
PROT SERPL-MCNC: 7.6 G/DL (ref 6–8.2)
RBC # BLD AUTO: 3.83 M/UL (ref 4.2–5.4)
SODIUM SERPL-SCNC: 133 MMOL/L (ref 135–145)
WBC # BLD AUTO: 10.5 K/UL (ref 4.8–10.8)

## 2024-12-23 PROCEDURE — 80053 COMPREHEN METABOLIC PANEL: CPT

## 2024-12-23 PROCEDURE — 96374 THER/PROPH/DIAG INJ IV PUSH: CPT

## 2024-12-23 PROCEDURE — 83690 ASSAY OF LIPASE: CPT

## 2024-12-23 PROCEDURE — 85025 COMPLETE CBC W/AUTO DIFF WBC: CPT

## 2024-12-23 PROCEDURE — A9270 NON-COVERED ITEM OR SERVICE: HCPCS | Performed by: EMERGENCY MEDICINE

## 2024-12-23 PROCEDURE — 96375 TX/PRO/DX INJ NEW DRUG ADDON: CPT

## 2024-12-23 PROCEDURE — 700101 HCHG RX REV CODE 250: Performed by: EMERGENCY MEDICINE

## 2024-12-23 PROCEDURE — 700102 HCHG RX REV CODE 250 W/ 637 OVERRIDE(OP): Performed by: EMERGENCY MEDICINE

## 2024-12-23 PROCEDURE — 36415 COLL VENOUS BLD VENIPUNCTURE: CPT

## 2024-12-23 PROCEDURE — 700111 HCHG RX REV CODE 636 W/ 250 OVERRIDE (IP): Performed by: EMERGENCY MEDICINE

## 2024-12-23 PROCEDURE — 74018 RADEX ABDOMEN 1 VIEW: CPT

## 2024-12-23 PROCEDURE — 99285 EMERGENCY DEPT VISIT HI MDM: CPT

## 2024-12-23 RX ORDER — PANTOPRAZOLE SODIUM 40 MG/1
40 TABLET, DELAYED RELEASE ORAL DAILY
Qty: 30 TABLET | Refills: 0 | Status: ON HOLD | OUTPATIENT
Start: 2024-12-23

## 2024-12-23 RX ORDER — ONDANSETRON 2 MG/ML
4 INJECTION INTRAMUSCULAR; INTRAVENOUS ONCE
Status: COMPLETED | OUTPATIENT
Start: 2024-12-23 | End: 2024-12-23

## 2024-12-23 RX ORDER — PANTOPRAZOLE SODIUM 40 MG/10ML
40 INJECTION, POWDER, LYOPHILIZED, FOR SOLUTION INTRAVENOUS ONCE
Status: COMPLETED | OUTPATIENT
Start: 2024-12-23 | End: 2024-12-23

## 2024-12-23 RX ADMIN — LIDOCAINE HYDROCHLORIDE 30 ML: 20 SOLUTION ORAL; TOPICAL at 21:28

## 2024-12-23 RX ADMIN — MAGNESIUM HYDROXIDE 30 ML: 1200 LIQUID ORAL at 22:13

## 2024-12-23 RX ADMIN — PANTOPRAZOLE SODIUM 40 MG: 40 INJECTION, POWDER, FOR SOLUTION INTRAVENOUS at 21:28

## 2024-12-23 RX ADMIN — ONDANSETRON 4 MG: 2 INJECTION INTRAMUSCULAR; INTRAVENOUS at 21:26

## 2024-12-23 ASSESSMENT — FIBROSIS 4 INDEX: FIB4 SCORE: 2.57

## 2024-12-23 ASSESSMENT — PAIN DESCRIPTION - DESCRIPTORS: DESCRIPTORS: BURNING

## 2024-12-24 NOTE — DISCHARGE INSTRUCTIONS
You are being placed on medication stop as a production to reduce the concerns of a possible bleed.    Continue using your nausea medication.  You are being given a laxative, this will start to work by tomorrow morning sometimes will cause some loose stools but this will treat the constipation.  Continue follow-up with your primary doctor, otherwise return for any change or worsening symptoms

## 2024-12-24 NOTE — ED NOTES
Pt. Verbalizes understanding of discharge instructions. accompanied to lobby with family. Pt. Alert/awake in NAD.  All questions answered and understood. Advised to ff-up with PCP. Medication teaching done.

## 2024-12-24 NOTE — ED PROVIDER NOTES
"ER Provider Note    Scribed for Jong Burrell D.O. by Jaz Sanchez. 12/23/2024  8:48 PM    Primary Care Provider: ZOË Maxwell    CHIEF COMPLAINT  Chief Complaint   Patient presents with    N/V    Constipation    Upper GI Bleed     PT presents d/t constipation, nausea and vomiting x 2 days. PT notes that today her emesis appears to have coffee grounds in it.      HPI/ROS    Anu Manuel is a 69 y.o. female who presents to the Emergency Department via EMS for vomiting onset yesterday. Patient reports she has a history of kidney disease. She notes a GFR of 8. Patient reports she has been going to dialysis as scheduled on Monday, Wednesday, Friday. Patient states she has been vomiting intermittently since 2021, however on Sunday she been vomiting more constantly noting progression to coffee ground vomit. Patient additionally notes acid reflux. She reports nausea constantly, for which she takes Zofran for. She has a history of autoimmune liver disease, reporting cirrhosis and history of type 1 diabetes. Denies history of GI bleed. Patient takes Tums daily. She reports taking 23 medications daily.     ROS as per HPI.    PAST MEDICAL HISTORY  Past Medical History:   Diagnosis Date    Accidental drug overdose 08/27/2012    Adverse effect of anesthesia     in 2008 \"throat closes up\"\"anxiety\" ?laryngospasm, kept in ICU. Pt states no problems currently 2018.     Anemia     Anesthesia     in 2008 \"throat closes up\"\"anxiety\" ?laryngospasm, kept in ICU. Pt states no problems currently 2018.     Arrhythmia     A FIB    Arthritis     right shoulder, hands, OSTEO    Bowel habit changes More frequent    Breath shortness     Broken hip (HCC) 04/2024    Broken hip possibly the pelvis.  Inoperable    Cataract 2022    BILAT IOL    Cigarette smoker quit 2013    Dental disorder     upper denture    depression 08/30/2016    denies depression, states has anxiety and panic attacks    Diabetes mellitus type 1 (HCC) " "1989    insulin    Dialysis patient (HCC) Short time due to a kidney injury from a infection    DIALYSIS, [facility], [facility]    Emphysema of lung (HCC)     COPD    Encounter for long-term (current) use of insulin (HCC) 09/25/2013    GI bleed     Heart burn     High cholesterol     Hypertension     Hypothyroidism, postsurgical 1970    Indigestion     Infectious disease      had hepatitis C, tested neg.    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) 06/10/2015    Vertigo      SURGICAL HISTORY  Past Surgical History:   Procedure Laterality Date    AV FISTULA REVISION Left 12/5/2024    Procedure: LIGATION OF LEFT UPPER ARM DIALYSIS GRAFT;  Surgeon: Denis Brown M.D.;  Location: SURGERY [facility];  Service: General    PB REMOVE PERM CANNULA/CATHETER  09/23/2024    Procedure: REMOVAL OF PERITONEAL DIALYSIS CATHETER;  Surgeon: Denis Brown M.D.;  Location: SURGERY [facility];  Service: General    AV FISTULA CREATION Left 09/23/2024    Procedure: CREATION OF LEFT UPPER EXTREMITY DIALYSIS GRAFT;  Surgeon: Denis Brown M.D.;  Location: SURGERY [facility];  Service: General    PB LAP INSERT INTRAPERITONEAL CATHETER  06/20/2024    Procedure: LAPAROSCOPIC INSERTION OF PERITONEAL DIALYSIS CATHETER;  Surgeon: Denis Brown M.D.;  Location: SURGERY [facility];  Service: General    NH ERCP,DIAGNOSTIC N/A 01/14/2022    Procedure: ERCP, DIAGNOSTIC - WITH SIGMOID COLON BIOPSY AND STENT REMOVAL;  Surgeon: Cr Haro M.D.;  Location: [facility];  Service: Gastroenterology    THADDEUS BY " LAPAROSCOPY N/A 10/28/2021    Procedure: CHOLECYSTECTOMY, LAPAROSCOPIC;  Surgeon: Tello Trammell M.D.;  Location: SURGERY [facility];  Service: General    FL ERCP,DIAGNOSTIC N/A 10/26/2021    Procedure: ERCP (ENDOSCOPIC RETROGRADE CHOLANGIOPANCREATOGRAPHY);  Surgeon: Cr Haro M.D.;  Location: SURGERY SAME DAY [facility];  Service: Gastroenterology    FL UPPER GI ENDOSCOPY,BIOPSY N/A 10/26/2021    Procedure: GASTROSCOPY, WITH BIOPSY;  Surgeon: Cr Haro M.D.;  Location: SURGERY SAME DAY [facility];  Service: Gastroenterology    FL UPPER GI ENDOSCOPY,DIAGNOSIS N/A 10/26/2021    Procedure: GASTROSCOPY;  Surgeon: Cr Haro M.D.;  Location: SURGERY SAME DAY [facility];  Service: Gastroenterology    CATH PLACEMENT  01/25/2020    Procedure: INSERTION, CATHETER PERM;  Surgeon: Rola Mendoza M.D.;  Location: SURGERY [facility];  Service: General    IRRIGATION & DEBRIDEMENT NEURO  01/19/2020    Procedure: IRRIGATION AND DEBRIDEMENT, WOUND THORACIC AND LUMBAR;  Surgeon: Ryan Roman M.D.;  Location: SURGERY [facility];  Service: Neurosurgery    FL INS/RPLC SPI NPG/RCVR POCKET N/A 12/16/2019    Procedure: EXPLORATION AT THORACIC 8 - 9, REPLACEMENT OF  NEUROSTIMULATOR LEAD;  Surgeon: Ryan Roman M.D.;  Location: [facility];  Service: Neurosurgery    SPINAL CORD STIMULATOR N/A 10/26/2018    Procedure: SPINAL CORD STIMULATOR;  Surgeon: Ryan Roman M.D.;  Location: SURGERY [facility];  Service: Neurosurgery    THORACIC LAMINECTOMY N/A 10/26/2018    Procedure: THORACIC LAMINECTOMY - FOR;  Surgeon: Ryan Roman M.D.;  Location: SURGERY [facility];  Service: Neurosurgery    FL NJX AA&/STRD TFRML EPI LUMBAR/SACRAL 1 LEVEL Right 08/31/2016    Procedure: INJ-FORAMEN EPI LUM/SAC SNGL L4-5;  Surgeon: Sukhi Godfrey M.D.;  Location: SURGERY [facility];  Service: Pain Management    LUMBAR LAMINECTOMY DISKECTOMY Right 05/10/2016    Procedure: LUMBAR L4-5 HEMILAMINECTOMY  DISKECTOMY ;  Surgeon: Arnold Keyes M.D.;  Location: SURGERY [facility];  Service:     CERVICAL FUSION POSTERIOR  01/16/2009    Performed by TARA CONTRERAS at SURGERY [facility]    HARDWARE REMOVAL NEURO  01/16/2009    Performed by TARA CONTRERAS at SURGERY [facility]    NECK EXPLORATION  01/16/2009    Performed by TARA CONTRERAS at SURGERY [facility]    SHOULDER ARTHROSCOPY W/ ROTATOR CUFF REPAIR  10/09/2008    Performed by SHERLY CASTANEDA at SURGERY [facility]    SHOULDER DECOMPRESSION ARTHROSCOPIC  06/17/2008    Performed by SHERLY CASTANEDA at [facility]    CLAVICLE DISTAL EXCISION  06/17/2008    Performed by SHERLY CASTANEDA at [facility]    CERVICAL DISK AND FUSION ANTERIOR  03/12/2008    HYSTERECTOMY, VAGINAL  2006    PARTIAL    APPENDECTOMY  2004    THYROID LOBECTOMY  1973    TONSILLECTOMY  1963    CATARACT PHACO WITH IOL      LUMPECTOMY  1976, 2005    Breast     LUMPECTOMY      OTHER ABDOMINAL SURGERY  2004    OTHER CARDIAC SURGERY  2020 stents inserted     FAMILY HISTORY  Family History   Problem Relation Age of Onset    Hypertension Mother     Cancer Father      SOCIAL HISTORY   reports that she has been smoking cigarettes. She has a 15 pack-year smoking history. She has never used smokeless tobacco. She reports that she does not currently use alcohol. She reports that she does not currently use drugs after having used the following drugs: Inhaled, Oral, and Marijuana.    CURRENT MEDICATIONS  Discharge Medication List as of 12/23/2024 10:13 PM        CONTINUE these medications which have NOT CHANGED    Details   famotidine (PEPCID) 20 MG Tab TAKE 1 TABLET BY MOUTH TWICE A DAY, Disp-180 Tablet, R-3, Normal      atorvastatin (LIPITOR) 40 MG Tab TAKE 1 TABLET BY MOUTH EVERY DAY IN THE EVENING, Disp-100 Tablet, R-0, Normal      riFAMPin (RIFADINE) 300 MG Cap Take 300 mg by mouth 2 times a day., Historical Med      lidocaine-prilocaine (EMLA) 2.5-2.5 % Cream  Apply 1 g topically as needed (AVF prior to dialysis). APPLY TO AFFECTED AREA AVF ACCESS 30-60 MINS PRIOR TO DIALYSIS TREATMENT WRAP IN GRACE RAMOS, Historical Med      gabapentin (NEURONTIN) 100 MG Cap Take 1 Capsule by mouth every Monday, Wednesday, and Friday.Take immediately after dialysisDisp-30 Capsule, R-0, Normal      Continuous Glucose Sensor (FREESTYLE PARISA 3 PLUS SENSOR) Misc 1 Each every 14 days., Disp-2 Each, R-11, Normal      ursodiol (ACTIGALL) 300 MG Cap Take 1 Capsule by mouth 3 times a day.   , Historical Med      Insulin Lispro-aabc, 1 U Dial, (LYUMJEV KWIKPEN) 100 UNIT/ML Solution Pen-injector Inject 10 Units under the skin 3 times a day before meals.Replaces Humalog.Disp-30 mL, R-3, Normal      Insulin Glargine, 1 Unit Dial, (TOUJEO SOLOSTAR) 300 UNIT/ML Solution Pen-injector Inject 45 Units under the skin every day.Sorry hit the weekly instead of daily tabDisp-13.5 mL, R-3, Normal      metoprolol SR (TOPROL XL) 100 MG TABLET SR 24 HR Take 150 mg by mouth every day. 1.5 tablets = 150mg, Historical Med      SYNTHROID 125 MCG Tab Take 2 Tablets by mouth every morning on an empty stomach., Disp-180 Tablet, R-2, JAGDISH, Normal      amLODIPine (NORVASC) 10 MG Tab Take 10 mg by mouth every day.   , Historical Med      Cyanocobalamin (VITAMIN B-12 PO) Take 1 Tablet by mouth every day.   , Historical Med      senna-docusate (SENOKOT S) 8.6-50 MG Tab Take 1 Tablet by mouth every day.   , Historical Med      oxycodone (OXY-IR) 15 MG immediate release tablet Take 15 mg by mouth every four hours as needed for Severe Pain.   , Historical Med      venlafaxine (EFFEXOR-XR) 150 MG extended-release capsule Take 150 mg by mouth every day.   , Historical Med      liothyronine (CYTOMEL) 5 MCG Tab Take 1 Tablet by mouth every day., Disp-90 Tablet, R-3, Normal      ondansetron (ZOFRAN ODT) 8 MG TABLET DISPERSIBLE Take 8 mg by mouth every 8 hours as needed for Nausea.   , Historical Med      omeprazole (PRILOSEC) 40 MG  delayed-release capsule Take 40 mg by mouth every day.   , Historical Med      VITAMIN D, ERGOCALCIFEROL, PO Take 1 Tablet by mouth 2 times a day.   , Historical Med      traZODone (DESYREL) 150 MG Tab Take 150 mg by mouth at bedtime.   , Historical Med           ALLERGIES  Tape    PHYSICAL EXAM  /63   Pulse 93   Temp 36.2 °C (97.1 °F) (Temporal)   Resp 20   Wt 57.5 kg (126 lb 12.2 oz)   LMP 04/29/2005 (LMP Unknown)   SpO2 93%   BMI 20.46 kg/m²     General: No acute distress.  HENT: Normocephalic, Mucus membranes are moist.   Chest: Lungs have even and unlabored respirations, Clear to auscultation.   Cardiovascular: Regular rate and regular rhythm, No peripheral cyanosis.  Abdomen: Non distended. Mild epigastric tenderness   Neuro: Awake, Conversive, Able to relay recent events.  Psychiatric: Calm and cooperative.       INITIAL ASSESSMENT  Patient has recurrent vomiting, episodes increased last few days. She noticed coffee ground vomit, no red blood. History of chronic renal failure on dialysis and liver disease. Evaluate for concerns of anemia, treat for pain and start proton pump inhibitor.     ED Observation Status? No; Patient does not meet criteria for ED Observation.     DIAGNOSTIC STUDIES  Labs:   Results for orders placed or performed during the hospital encounter of 12/23/24   CBC WITH DIFFERENTIAL    Collection Time: 12/23/24  9:24 PM   Result Value Ref Range    WBC 10.5 4.8 - 10.8 K/uL    RBC 3.83 (L) 4.20 - 5.40 M/uL    Hemoglobin 11.8 (L) 12.0 - 16.0 g/dL    Hematocrit 35.9 (L) 37.0 - 47.0 %    MCV 93.7 81.4 - 97.8 fL    MCH 30.8 27.0 - 33.0 pg    MCHC 32.9 32.2 - 35.5 g/dL    RDW 51.9 (H) 35.9 - 50.0 fL    Platelet Count 217 164 - 446 K/uL    MPV 9.9 9.0 - 12.9 fL    Neutrophils-Polys 77.00 (H) 44.00 - 72.00 %    Lymphocytes 9.10 (L) 22.00 - 41.00 %    Monocytes 12.00 0.00 - 13.40 %    Eosinophils 0.20 0.00 - 6.90 %    Basophils 0.60 0.00 - 1.80 %    Immature Granulocytes 1.10 (H) 0.00 -  0.90 %    Nucleated RBC 0.20 0.00 - 0.20 /100 WBC    Neutrophils (Absolute) 8.11 (H) 1.82 - 7.42 K/uL    Lymphs (Absolute) 0.96 (L) 1.00 - 4.80 K/uL    Monos (Absolute) 1.27 (H) 0.00 - 0.85 K/uL    Eos (Absolute) 0.02 0.00 - 0.51 K/uL    Baso (Absolute) 0.06 0.00 - 0.12 K/uL    Immature Granulocytes (abs) 0.12 (H) 0.00 - 0.11 K/uL    NRBC (Absolute) 0.02 K/uL   COMP METABOLIC PANEL    Collection Time: 12/23/24  9:24 PM   Result Value Ref Range    Sodium 133 (L) 135 - 145 mmol/L    Potassium 4.3 3.6 - 5.5 mmol/L    Chloride 89 (L) 96 - 112 mmol/L    Co2 20 20 - 33 mmol/L    Anion Gap 24.0 (H) 7.0 - 16.0    Glucose 259 (H) 65 - 99 mg/dL    Bun 21 8 - 22 mg/dL    Creatinine 3.75 (H) 0.50 - 1.40 mg/dL    Calcium 9.2 8.4 - 10.2 mg/dL    Correct Calcium 9.9 8.5 - 10.5 mg/dL    AST(SGOT) 25 12 - 45 U/L    ALT(SGPT) 22 2 - 50 U/L    Alkaline Phosphatase 668 (H) 30 - 99 U/L    Total Bilirubin 0.9 0.1 - 1.5 mg/dL    Albumin 3.1 (L) 3.2 - 4.9 g/dL    Total Protein 7.6 6.0 - 8.2 g/dL    Globulin 4.5 (H) 1.9 - 3.5 g/dL    A-G Ratio 0.7 g/dL   LIPASE    Collection Time: 12/23/24  9:24 PM   Result Value Ref Range    Lipase 7 (L) 11 - 82 U/L   ESTIMATED GFR    Collection Time: 12/23/24  9:24 PM   Result Value Ref Range    GFR (CKD-EPI) 13 (A) >60 mL/min/1.73 m 2     *Note: Due to a large number of results and/or encounters for the requested time period, some results have not been displayed. A complete set of results can be found in Results Review.     Radiology:   The attending emergency physician has independently interpreted the diagnostic imaging associated with this visit and am waiting the final reading from the radiologist.   Preliminary interpretation is as follows: Constipation  Radiologist interpretation:   [id number] VIEW   Final Result         1.  Moderate stool in the colon suggests changes of constipation, otherwise nonspecific bowel gas pattern in the upper abdomen          COURSE & MEDICAL DECISION MAKING      COURSE AND PLAN  8:53 PM - Patient was seen and evaluated at bedside. Patient presents to the ED for vomiting.  After my exam, I discussed with the patient the plan of care, which includes treating the patient with medication for their symptoms, as well as obtaining lab work and imaging for further evaluation. Patient understands and verbalizes agreement to plan of care. Patient will be treated with Zofran 4 mg, GI cocktail and Protonix 40 mg. Ordered DX abdomen, CBC w diff and CMP to evaluate.     10:00 PM - Patient was reevaluated at bedside. Patient feels improved at this time. Discussed lab and radiology results with the patient.  I then informed the patient of my plan for discharge, which includes strict return precautions for any new or worsening symptoms. Patient understands and verbalizes agreement to plan of care. Patient is comfortable going home at this time.     ED Summary: The patient presents with vomiting which has been a longstanding problem for she vomited more over the last couple of days and did have an episode of coffee ground emesis.  It was small it was after several bouts of vomiting.  There is no bright red bleeding no black stools.  And I do not suspect significant GI bleed she is not tachycardic, she is placed on IV proton pump inhibitor given GI cocktail her symptoms did resolve.    Her hemoglobin is stable, there is no signs of significant active bleeding she is stable for discharge home with treatment for part with proton pump inhibitor    DISPOSITION AND DISCUSSIONS   The patient will return for new or worsening symptoms and is stable at the time of discharge.    DISPOSITION:  Patient will be discharged home in stable condition.    FOLLOW UP:  PAM Maxwell.  [address]  [facility] [address]  [phone]    In 1 week        OUTPATIENT MEDICATIONS:  Discharge Medication List as of 12/23/2024 10:13 PM        START taking these medications    Details   pantoprazole  (PROTONIX) 40 MG Tablet Delayed Response Take 1 Tablet by mouth every day., Disp-30 Tablet, R-0, Normal           FINAL DIAGNOSIS  1. Coffee ground emesis    2. Constipation, unspecified constipation type    3. Nausea and vomiting, unspecified vomiting type      I, Jaz Sanchez (Scribe), am scribing for, and in the presence of, Jong Burrell D.O..    Electronically signed by: Jaz Sanchez (Scribe), 12/23/2024    IJong D.O. personally performed the services described in this documentation, as scribed by Jaz Sanchez in my presence, and it is both accurate and complete.     The note accurately reflects work and decisions made by me.  Jong Burrell D.O.  12/23/2024  11:36 PM

## 2024-12-24 NOTE — ED TRIAGE NOTES
Chief Complaint   Patient presents with    N/V    Constipation    Upper GI Bleed     PT presents d/t constipation, nausea and vomiting x 2 days. PT notes that today her emesis appears to have coffee grounds in it.      /63   Pulse 93   Temp 36.2 °C (97.1 °F) (Temporal)   Resp 20   Wt 57.5 kg (126 lb 12.2 oz)   LMP 04/29/2005 (LMP Unknown)   SpO2 93%   BMI 20.46 kg/m²

## 2024-12-26 ENCOUNTER — TELEPHONE (OUTPATIENT)
Dept: HEALTH INFORMATION MANAGEMENT | Facility: OTHER | Age: 69
End: 2024-12-26
Payer: MEDICARE

## 2024-12-26 NOTE — TELEPHONE ENCOUNTER
"Patient called regarding recent concerns and requesting a hospice referral from her provider. Patient concerns are as follow: \"Patient requesting an appointment with Anny PRADO in order to discuss hospice. Patient reported that she spoke to Mark PRADO in the past regarding hospice care as a potential option and would like to have a referral placed in order to see if she meets criteria for hospice. Patient verbalized that she has still been having emesis even after being sent home from the ED recently for the same issue (see 12/23/ ED visit note). The patient also reported that she is open to a virtual video appointment to discuss this as it is hard for her to leave the house due to her frequent bouts of nausea/emesis.\" Chart routed to Jarrell Yates MD in order to advise on the matter. The patient also requested [facility] ride transportation in order to attend her upcoming dialysis appointment, therefore the contact information was provided via [employer] message. The patient verbalized intent to attend her upcoming dialysis appointment as well. As the patient seemed quite conflicted and emotional secondary to her health status, I provided contact information at [phone] for the Suicide and Crisis Lifeline just in case the patient needed someone to talk to after clinic hours regarding her behavioral health. Patient denied thoughts of self-harm and denied thoughts of suicidal ideation. Patient also requested a new advanced directive packet- plans made to mail the packet to the patient tomorrow. Patient verbalized that she has no further questions, concerns, or needs at this time. Patient encouraged to call the PCM department line for any further questions or concerns.  "

## 2024-12-31 ENCOUNTER — HOSPITAL ENCOUNTER (INPATIENT)
Facility: MEDICAL CENTER | Age: 69
End: 2024-12-31
Attending: EMERGENCY MEDICINE | Admitting: STUDENT IN AN ORGANIZED HEALTH CARE EDUCATION/TRAINING PROGRAM
Payer: MEDICARE

## 2024-12-31 ENCOUNTER — PATIENT OUTREACH (OUTPATIENT)
Dept: HEALTH INFORMATION MANAGEMENT | Facility: OTHER | Age: 69
End: 2024-12-31

## 2024-12-31 ENCOUNTER — APPOINTMENT (OUTPATIENT)
Dept: RADIOLOGY | Facility: MEDICAL CENTER | Age: 69
DRG: 640 | End: 2024-12-31
Attending: EMERGENCY MEDICINE
Payer: MEDICARE

## 2024-12-31 VITALS
HEIGHT: 66 IN | WEIGHT: 125 LBS | DIASTOLIC BLOOD PRESSURE: 75 MMHG | HEART RATE: 67 BPM | BODY MASS INDEX: 20.09 KG/M2 | OXYGEN SATURATION: 100 % | TEMPERATURE: 97.5 F | RESPIRATION RATE: 16 BRPM | SYSTOLIC BLOOD PRESSURE: 153 MMHG

## 2024-12-31 DIAGNOSIS — K74.69 OTHER CIRRHOSIS OF LIVER (HCC): ICD-10-CM

## 2024-12-31 DIAGNOSIS — E87.5 HYPERKALEMIA: ICD-10-CM

## 2024-12-31 DIAGNOSIS — Z99.2 DIALYSIS PATIENT (HCC): ICD-10-CM

## 2024-12-31 DIAGNOSIS — Z72.0 TOBACCO ABUSE: Chronic | ICD-10-CM

## 2024-12-31 DIAGNOSIS — M80.00XD AGE-RELATED OSTEOPOROSIS WITH CURRENT PATHOLOGICAL FRACTURE WITH ROUTINE HEALING: ICD-10-CM

## 2024-12-31 DIAGNOSIS — E89.0 HYPOTHYROIDISM, POSTSURGICAL: ICD-10-CM

## 2024-12-31 DIAGNOSIS — G89.29 CHRONIC LOW BACK PAIN, UNSPECIFIED BACK PAIN LATERALITY, UNSPECIFIED WHETHER SCIATICA PRESENT: ICD-10-CM

## 2024-12-31 DIAGNOSIS — M54.6 ACUTE RIGHT-SIDED THORACIC BACK PAIN: ICD-10-CM

## 2024-12-31 DIAGNOSIS — E10.65 TYPE 1 DIABETES MELLITUS WITH HYPERGLYCEMIA (HCC): ICD-10-CM

## 2024-12-31 DIAGNOSIS — M54.50 CHRONIC LOW BACK PAIN, UNSPECIFIED BACK PAIN LATERALITY, UNSPECIFIED WHETHER SCIATICA PRESENT: ICD-10-CM

## 2024-12-31 DIAGNOSIS — N18.6 ESRD NEEDING DIALYSIS (HCC): ICD-10-CM

## 2024-12-31 DIAGNOSIS — Z99.2 ESRD NEEDING DIALYSIS (HCC): ICD-10-CM

## 2024-12-31 DIAGNOSIS — M54.50 BILATERAL LOW BACK PAIN WITHOUT SCIATICA, UNSPECIFIED CHRONICITY: ICD-10-CM

## 2024-12-31 DIAGNOSIS — R73.9 HYPERGLYCEMIA: Primary | ICD-10-CM

## 2024-12-31 PROBLEM — D63.1 ANEMIA DUE TO CHRONIC KIDNEY DISEASE, ON CHRONIC DIALYSIS (HCC): Status: ACTIVE | Noted: 2024-12-31

## 2024-12-31 LAB
ALBUMIN SERPL BCP-MCNC: 2.9 G/DL (ref 3.2–4.9)
ALBUMIN/GLOB SERPL: 0.7 G/DL
ALP SERPL-CCNC: 558 U/L (ref 30–99)
ALT SERPL-CCNC: 12 U/L (ref 2–50)
ANION GAP SERPL CALC-SCNC: 15 MMOL/L (ref 7–16)
ANION GAP SERPL CALC-SCNC: 16 MMOL/L (ref 7–16)
AST SERPL-CCNC: 23 U/L (ref 12–45)
B-OH-BUTYR SERPL-MCNC: 0.99 MMOL/L (ref 0.02–0.27)
BASE EXCESS BLDV CALC-SCNC: -3 MMOL/L (ref -2–3)
BASOPHILS # BLD AUTO: 0.9 % (ref 0–1.8)
BASOPHILS # BLD: 0.11 K/UL (ref 0–0.12)
BILIRUB SERPL-MCNC: 0.7 MG/DL (ref 0.1–1.5)
BODY TEMPERATURE: 36.7 CENTIGRADE
BUN SERPL-MCNC: 28 MG/DL (ref 8–22)
BUN SERPL-MCNC: 30 MG/DL (ref 8–22)
CALCIUM ALBUM COR SERPL-MCNC: 8.7 MG/DL (ref 8.5–10.5)
CALCIUM SERPL-MCNC: 7.8 MG/DL (ref 8.5–10.5)
CALCIUM SERPL-MCNC: 8.2 MG/DL (ref 8.5–10.5)
CHLORIDE SERPL-SCNC: 88 MMOL/L (ref 96–112)
CHLORIDE SERPL-SCNC: 96 MMOL/L (ref 96–112)
CO2 SERPL-SCNC: 18 MMOL/L (ref 20–33)
CO2 SERPL-SCNC: 21 MMOL/L (ref 20–33)
CREAT SERPL-MCNC: 3.11 MG/DL (ref 0.5–1.4)
CREAT SERPL-MCNC: 3.47 MG/DL (ref 0.5–1.4)
EKG IMPRESSION: NORMAL
EOSINOPHIL # BLD AUTO: 0.05 K/UL (ref 0–0.51)
EOSINOPHIL NFR BLD: 0.4 % (ref 0–6.9)
ERYTHROCYTE [DISTWIDTH] IN BLOOD BY AUTOMATED COUNT: 56.9 FL (ref 35.9–50)
ETHANOL BLD-MCNC: <10.1 MG/DL
GFR SERPLBLD CREATININE-BSD FMLA CKD-EPI: 14 ML/MIN/1.73 M 2
GFR SERPLBLD CREATININE-BSD FMLA CKD-EPI: 16 ML/MIN/1.73 M 2
GLOBULIN SER CALC-MCNC: 4.2 G/DL (ref 1.9–3.5)
GLUCOSE BLD STRIP.AUTO-MCNC: 101 MG/DL (ref 65–99)
GLUCOSE BLD STRIP.AUTO-MCNC: 267 MG/DL (ref 65–99)
GLUCOSE BLD STRIP.AUTO-MCNC: 316 MG/DL (ref 65–99)
GLUCOSE BLD STRIP.AUTO-MCNC: 369 MG/DL (ref 65–99)
GLUCOSE BLD STRIP.AUTO-MCNC: 388 MG/DL (ref 65–99)
GLUCOSE BLD STRIP.AUTO-MCNC: 40 MG/DL (ref 65–99)
GLUCOSE BLD STRIP.AUTO-MCNC: 479 MG/DL (ref 65–99)
GLUCOSE BLD STRIP.AUTO-MCNC: >600 MG/DL (ref 65–99)
GLUCOSE BLD STRIP.AUTO-MCNC: >600 MG/DL (ref 65–99)
GLUCOSE SERPL-MCNC: 103 MG/DL (ref 65–99)
GLUCOSE SERPL-MCNC: 798 MG/DL (ref 65–99)
HCO3 BLDV-SCNC: 23 MMOL/L (ref 22–29)
HCT VFR BLD AUTO: 38.2 % (ref 37–47)
HGB BLD-MCNC: 11.7 G/DL (ref 12–16)
IMM GRANULOCYTES # BLD AUTO: 0.13 K/UL (ref 0–0.11)
IMM GRANULOCYTES NFR BLD AUTO: 1.1 % (ref 0–0.9)
INHALED O2 FLOW RATE: ABNORMAL L/MIN
LIPASE SERPL-CCNC: 15 U/L (ref 11–82)
LYMPHOCYTES # BLD AUTO: 0.52 K/UL (ref 1–4.8)
LYMPHOCYTES NFR BLD: 4.4 % (ref 22–41)
MCH RBC QN AUTO: 30.8 PG (ref 27–33)
MCHC RBC AUTO-ENTMCNC: 30.6 G/DL (ref 32.2–35.5)
MCV RBC AUTO: 100.5 FL (ref 81.4–97.8)
MONOCYTES # BLD AUTO: 0.74 K/UL (ref 0–0.85)
MONOCYTES NFR BLD AUTO: 6.3 % (ref 0–13.4)
NEUTROPHILS # BLD AUTO: 10.14 K/UL (ref 1.82–7.42)
NEUTROPHILS NFR BLD: 86.9 % (ref 44–72)
NRBC # BLD AUTO: 0 K/UL
NRBC BLD-RTO: 0 /100 WBC (ref 0–0.2)
NT-PROBNP SERPL IA-MCNC: 3453 PG/ML (ref 0–125)
OSMOLALITY SERPL: 330 MOSM/KG H2O (ref 278–298)
PCO2 BLDV: 40.2 MMHG (ref 38–54)
PCO2 TEMP ADJ BLDV: 39.7 MMHG (ref 38–54)
PH BLDV: 7.37 [PH] (ref 7.31–7.45)
PH TEMP ADJ BLDV: 7.37 [PH] (ref 7.31–7.45)
PLATELET # BLD AUTO: 220 K/UL (ref 164–446)
PMV BLD AUTO: 9.7 FL (ref 9–12.9)
PO2 BLDV: 57.7 MMHG (ref 23–48)
PO2 TEMP ADJ BLDV: 56.5 MMHG (ref 23–48)
POTASSIUM SERPL-SCNC: 2.7 MMOL/L (ref 3.6–5.5)
POTASSIUM SERPL-SCNC: 4.7 MMOL/L (ref 3.6–5.5)
POTASSIUM SERPL-SCNC: 6.2 MMOL/L (ref 3.6–5.5)
PROT SERPL-MCNC: 7.1 G/DL (ref 6–8.2)
RBC # BLD AUTO: 3.8 M/UL (ref 4.2–5.4)
SAO2 % BLDV: 88 % (ref 60–85)
SODIUM SERPL-SCNC: 122 MMOL/L (ref 135–145)
SODIUM SERPL-SCNC: 132 MMOL/L (ref 135–145)
TROPONIN T SERPL-MCNC: 77 NG/L (ref 6–19)
TROPONIN T SERPL-MCNC: 80 NG/L (ref 6–19)
WBC # BLD AUTO: 11.7 K/UL (ref 4.8–10.8)

## 2024-12-31 PROCEDURE — 700101 HCHG RX REV CODE 250: Performed by: STUDENT IN AN ORGANIZED HEALTH CARE EDUCATION/TRAINING PROGRAM

## 2024-12-31 PROCEDURE — 36415 COLL VENOUS BLD VENIPUNCTURE: CPT

## 2024-12-31 PROCEDURE — 80053 COMPREHEN METABOLIC PANEL: CPT

## 2024-12-31 PROCEDURE — 99406 BEHAV CHNG SMOKING 3-10 MIN: CPT

## 2024-12-31 PROCEDURE — 700102 HCHG RX REV CODE 250 W/ 637 OVERRIDE(OP): Performed by: STUDENT IN AN ORGANIZED HEALTH CARE EDUCATION/TRAINING PROGRAM

## 2024-12-31 PROCEDURE — 84132 ASSAY OF SERUM POTASSIUM: CPT

## 2024-12-31 PROCEDURE — 770020 HCHG ROOM/CARE - TELE (206)

## 2024-12-31 PROCEDURE — 93005 ELECTROCARDIOGRAM TRACING: CPT | Mod: TC

## 2024-12-31 PROCEDURE — 96375 TX/PRO/DX INJ NEW DRUG ADDON: CPT

## 2024-12-31 PROCEDURE — 82803 BLOOD GASES ANY COMBINATION: CPT

## 2024-12-31 PROCEDURE — 93005 ELECTROCARDIOGRAM TRACING: CPT | Mod: TC | Performed by: EMERGENCY MEDICINE

## 2024-12-31 PROCEDURE — 83690 ASSAY OF LIPASE: CPT

## 2024-12-31 PROCEDURE — A9270 NON-COVERED ITEM OR SERVICE: HCPCS | Performed by: EMERGENCY MEDICINE

## 2024-12-31 PROCEDURE — 80048 BASIC METABOLIC PNL TOTAL CA: CPT

## 2024-12-31 PROCEDURE — 83930 ASSAY OF BLOOD OSMOLALITY: CPT

## 2024-12-31 PROCEDURE — 99222 1ST HOSP IP/OBS MODERATE 55: CPT | Performed by: INTERNAL MEDICINE

## 2024-12-31 PROCEDURE — 96376 TX/PRO/DX INJ SAME DRUG ADON: CPT

## 2024-12-31 PROCEDURE — 83880 ASSAY OF NATRIURETIC PEPTIDE: CPT

## 2024-12-31 PROCEDURE — 99285 EMERGENCY DEPT VISIT HI MDM: CPT

## 2024-12-31 PROCEDURE — 82962 GLUCOSE BLOOD TEST: CPT | Mod: 91

## 2024-12-31 PROCEDURE — 84484 ASSAY OF TROPONIN QUANT: CPT | Mod: 91

## 2024-12-31 PROCEDURE — 82010 KETONE BODYS QUAN: CPT

## 2024-12-31 PROCEDURE — 99406 BEHAV CHNG SMOKING 3-10 MIN: CPT | Performed by: STUDENT IN AN ORGANIZED HEALTH CARE EDUCATION/TRAINING PROGRAM

## 2024-12-31 PROCEDURE — 71045 X-RAY EXAM CHEST 1 VIEW: CPT

## 2024-12-31 PROCEDURE — 96365 THER/PROPH/DIAG IV INF INIT: CPT

## 2024-12-31 PROCEDURE — A9270 NON-COVERED ITEM OR SERVICE: HCPCS | Performed by: STUDENT IN AN ORGANIZED HEALTH CARE EDUCATION/TRAINING PROGRAM

## 2024-12-31 PROCEDURE — 99291 CRITICAL CARE FIRST HOUR: CPT | Mod: 25 | Performed by: STUDENT IN AN ORGANIZED HEALTH CARE EDUCATION/TRAINING PROGRAM

## 2024-12-31 PROCEDURE — 700111 HCHG RX REV CODE 636 W/ 250 OVERRIDE (IP): Performed by: STUDENT IN AN ORGANIZED HEALTH CARE EDUCATION/TRAINING PROGRAM

## 2024-12-31 PROCEDURE — 700102 HCHG RX REV CODE 250 W/ 637 OVERRIDE(OP): Performed by: EMERGENCY MEDICINE

## 2024-12-31 PROCEDURE — 96372 THER/PROPH/DIAG INJ SC/IM: CPT

## 2024-12-31 PROCEDURE — 700111 HCHG RX REV CODE 636 W/ 250 OVERRIDE (IP): Performed by: EMERGENCY MEDICINE

## 2024-12-31 PROCEDURE — 85025 COMPLETE CBC W/AUTO DIFF WBC: CPT

## 2024-12-31 PROCEDURE — 82077 ASSAY SPEC XCP UR&BREATH IA: CPT

## 2024-12-31 PROCEDURE — 5A1D70Z PERFORMANCE OF URINARY FILTRATION, INTERMITTENT, LESS THAN 6 HOURS PER DAY: ICD-10-PCS | Performed by: INTERNAL MEDICINE

## 2024-12-31 RX ORDER — AMLODIPINE BESYLATE 10 MG/1
10 TABLET ORAL DAILY
Status: DISCONTINUED | OUTPATIENT
Start: 2024-12-31 | End: 2025-01-03 | Stop reason: HOSPADM

## 2024-12-31 RX ORDER — OXYCODONE HYDROCHLORIDE 15 MG/1
15 TABLET ORAL EVERY 4 HOURS PRN
Status: DISCONTINUED | OUTPATIENT
Start: 2024-12-31 | End: 2025-01-03 | Stop reason: HOSPADM

## 2024-12-31 RX ORDER — HYDRALAZINE HYDROCHLORIDE 20 MG/ML
10 INJECTION INTRAMUSCULAR; INTRAVENOUS EVERY 4 HOURS PRN
Status: DISCONTINUED | OUTPATIENT
Start: 2024-12-31 | End: 2025-01-03 | Stop reason: HOSPADM

## 2024-12-31 RX ORDER — HYDROMORPHONE HYDROCHLORIDE 1 MG/ML
1 INJECTION, SOLUTION INTRAMUSCULAR; INTRAVENOUS; SUBCUTANEOUS ONCE
Status: COMPLETED | OUTPATIENT
Start: 2024-12-31 | End: 2024-12-31

## 2024-12-31 RX ORDER — NICOTINE 21 MG/24HR
21 PATCH, TRANSDERMAL 24 HOURS TRANSDERMAL
Status: DISCONTINUED | OUTPATIENT
Start: 2024-12-31 | End: 2025-01-03 | Stop reason: HOSPADM

## 2024-12-31 RX ORDER — DEXTROSE MONOHYDRATE 25 G/50ML
25 INJECTION, SOLUTION INTRAVENOUS
Status: DISCONTINUED | OUTPATIENT
Start: 2024-12-31 | End: 2024-12-31

## 2024-12-31 RX ORDER — METOPROLOL SUCCINATE 50 MG/1
100 TABLET, EXTENDED RELEASE ORAL DAILY
Status: DISCONTINUED | OUTPATIENT
Start: 2024-12-31 | End: 2025-01-03 | Stop reason: HOSPADM

## 2024-12-31 RX ORDER — AMOXICILLIN 250 MG
1 CAPSULE ORAL DAILY
Status: DISCONTINUED | OUTPATIENT
Start: 2024-12-31 | End: 2025-01-02

## 2024-12-31 RX ORDER — LIOTHYRONINE SODIUM 5 UG/1
5 TABLET ORAL DAILY
Status: DISCONTINUED | OUTPATIENT
Start: 2024-12-31 | End: 2025-01-03 | Stop reason: HOSPADM

## 2024-12-31 RX ORDER — LIDOCAINE 4 G/G
1 PATCH TOPICAL EVERY 24 HOURS
Status: DISCONTINUED | OUTPATIENT
Start: 2024-12-31 | End: 2025-01-03 | Stop reason: HOSPADM

## 2024-12-31 RX ORDER — MORPHINE SULFATE 4 MG/ML
4 INJECTION INTRAVENOUS EVERY 4 HOURS PRN
Status: DISCONTINUED | OUTPATIENT
Start: 2024-12-31 | End: 2025-01-02

## 2024-12-31 RX ORDER — URSODIOL 300 MG/1
300 CAPSULE ORAL 3 TIMES DAILY
Status: DISCONTINUED | OUTPATIENT
Start: 2024-12-31 | End: 2025-01-03 | Stop reason: HOSPADM

## 2024-12-31 RX ORDER — LEVOTHYROXINE SODIUM 125 UG/1
250 TABLET ORAL
Status: DISCONTINUED | OUTPATIENT
Start: 2024-12-31 | End: 2025-01-03 | Stop reason: HOSPADM

## 2024-12-31 RX ORDER — DEXTROSE MONOHYDRATE 25 G/50ML
25 INJECTION, SOLUTION INTRAVENOUS
Status: DISCONTINUED | OUTPATIENT
Start: 2024-12-31 | End: 2025-01-01

## 2024-12-31 RX ORDER — HEPARIN SODIUM 5000 [USP'U]/ML
5000 INJECTION, SOLUTION INTRAVENOUS; SUBCUTANEOUS EVERY 8 HOURS
Status: DISCONTINUED | OUTPATIENT
Start: 2024-12-31 | End: 2025-01-03 | Stop reason: HOSPADM

## 2024-12-31 RX ORDER — GABAPENTIN 100 MG/1
100 CAPSULE ORAL
Status: DISCONTINUED | OUTPATIENT
Start: 2025-01-01 | End: 2025-01-03 | Stop reason: HOSPADM

## 2024-12-31 RX ORDER — METHOCARBAMOL 500 MG/1
1000 TABLET, FILM COATED ORAL ONCE
Status: COMPLETED | OUTPATIENT
Start: 2024-12-31 | End: 2024-12-31

## 2024-12-31 RX ORDER — CALCIUM GLUCONATE 20 MG/ML
2 INJECTION, SOLUTION INTRAVENOUS ONCE
Status: COMPLETED | OUTPATIENT
Start: 2024-12-31 | End: 2024-12-31

## 2024-12-31 RX ORDER — INSULIN LISPRO 100 [IU]/ML
3-14 INJECTION, SOLUTION INTRAVENOUS; SUBCUTANEOUS
Status: DISCONTINUED | OUTPATIENT
Start: 2024-12-31 | End: 2024-12-31

## 2024-12-31 RX ORDER — ATORVASTATIN CALCIUM 40 MG/1
40 TABLET, FILM COATED ORAL EVERY EVENING
Status: DISCONTINUED | OUTPATIENT
Start: 2024-12-31 | End: 2025-01-03 | Stop reason: HOSPADM

## 2024-12-31 RX ORDER — SEVELAMER CARBONATE 800 MG/1
800 TABLET, FILM COATED ORAL
Status: DISCONTINUED | OUTPATIENT
Start: 2024-12-31 | End: 2025-01-03 | Stop reason: HOSPADM

## 2024-12-31 RX ORDER — POTASSIUM CHLORIDE 1500 MG/1
40 TABLET, EXTENDED RELEASE ORAL ONCE
Status: COMPLETED | OUTPATIENT
Start: 2024-12-31 | End: 2024-12-31

## 2024-12-31 RX ORDER — VENLAFAXINE HYDROCHLORIDE 75 MG/1
150 CAPSULE, EXTENDED RELEASE ORAL DAILY
Status: DISCONTINUED | OUTPATIENT
Start: 2024-12-31 | End: 2025-01-03 | Stop reason: HOSPADM

## 2024-12-31 RX ORDER — SEVELAMER HYDROCHLORIDE 800 MG/1
800 TABLET, FILM COATED ORAL
Status: ON HOLD | COMMUNITY

## 2024-12-31 RX ORDER — RIFAMPIN 300 MG/1
300 CAPSULE ORAL 2 TIMES DAILY
Status: DISCONTINUED | OUTPATIENT
Start: 2024-12-31 | End: 2025-01-03 | Stop reason: HOSPADM

## 2024-12-31 RX ORDER — CYCLOBENZAPRINE HCL 10 MG
10 TABLET ORAL 3 TIMES DAILY
Status: DISCONTINUED | OUTPATIENT
Start: 2024-12-31 | End: 2025-01-03 | Stop reason: HOSPADM

## 2024-12-31 RX ORDER — BACLOFEN 10 MG/1
10 TABLET ORAL 3 TIMES DAILY
Status: DISCONTINUED | OUTPATIENT
Start: 2024-12-31 | End: 2024-12-31

## 2024-12-31 RX ORDER — INSULIN LISPRO 100 [IU]/ML
3-14 INJECTION, SOLUTION INTRAVENOUS; SUBCUTANEOUS EVERY 6 HOURS
Status: DISCONTINUED | OUTPATIENT
Start: 2024-12-31 | End: 2024-12-31

## 2024-12-31 RX ORDER — INSULIN LISPRO 100 [IU]/ML
3-14 INJECTION, SOLUTION INTRAVENOUS; SUBCUTANEOUS
Status: DISCONTINUED | OUTPATIENT
Start: 2024-12-31 | End: 2025-01-01

## 2024-12-31 RX ORDER — OXYCODONE AND ACETAMINOPHEN 5; 325 MG/1; MG/1
1 TABLET ORAL EVERY 4 HOURS PRN
Status: DISCONTINUED | OUTPATIENT
Start: 2024-12-31 | End: 2024-12-31

## 2024-12-31 RX ADMIN — HYDROMORPHONE HYDROCHLORIDE 1 MG: 1 INJECTION, SOLUTION INTRAMUSCULAR; INTRAVENOUS; SUBCUTANEOUS at 04:19

## 2024-12-31 RX ADMIN — LIOTHYRONINE SODIUM 5 MCG: 5 TABLET ORAL at 07:34

## 2024-12-31 RX ADMIN — OXYCODONE HYDROCHLORIDE 15 MG: 10 TABLET ORAL at 07:00

## 2024-12-31 RX ADMIN — AMLODIPINE BESYLATE 10 MG: 10 TABLET ORAL at 07:01

## 2024-12-31 RX ADMIN — SEVELAMER CARBONATE 800 MG: 800 TABLET, FILM COATED ORAL at 07:02

## 2024-12-31 RX ADMIN — BACLOFEN 10 MG: 10 TABLET ORAL at 06:41

## 2024-12-31 RX ADMIN — INSULIN HUMAN 10 UNITS: 100 INJECTION, SOLUTION PARENTERAL at 05:07

## 2024-12-31 RX ADMIN — METOPROLOL SUCCINATE 100 MG: 50 TABLET, EXTENDED RELEASE ORAL at 08:00

## 2024-12-31 RX ADMIN — HEPARIN SODIUM 5000 UNITS: 5000 INJECTION, SOLUTION INTRAVENOUS; SUBCUTANEOUS at 22:27

## 2024-12-31 RX ADMIN — OMEPRAZOLE 40 MG: 20 CAPSULE, DELAYED RELEASE ORAL at 07:01

## 2024-12-31 RX ADMIN — INSULIN LISPRO 7 UNITS: 100 INJECTION, SOLUTION INTRAVENOUS; SUBCUTANEOUS at 12:00

## 2024-12-31 RX ADMIN — MORPHINE SULFATE 4 MG: 4 INJECTION INTRAVENOUS at 19:59

## 2024-12-31 RX ADMIN — POTASSIUM CHLORIDE 40 MEQ: 1500 TABLET, EXTENDED RELEASE ORAL at 08:12

## 2024-12-31 RX ADMIN — INSULIN GLARGINE-YFGN 30 UNITS: 100 INJECTION, SOLUTION SUBCUTANEOUS at 08:12

## 2024-12-31 RX ADMIN — OXYCODONE HYDROCHLORIDE 15 MG: 10 TABLET ORAL at 12:45

## 2024-12-31 RX ADMIN — MORPHINE SULFATE 4 MG: 4 INJECTION INTRAVENOUS at 08:00

## 2024-12-31 RX ADMIN — VENLAFAXINE HYDROCHLORIDE 150 MG: 75 CAPSULE, EXTENDED RELEASE ORAL at 07:34

## 2024-12-31 RX ADMIN — METHOCARBAMOL 1000 MG: 500 TABLET ORAL at 05:05

## 2024-12-31 RX ADMIN — HEPARIN SODIUM 5000 UNITS: 5000 INJECTION, SOLUTION INTRAVENOUS; SUBCUTANEOUS at 17:24

## 2024-12-31 RX ADMIN — HEPARIN SODIUM 5000 UNITS: 5000 INJECTION, SOLUTION INTRAVENOUS; SUBCUTANEOUS at 06:31

## 2024-12-31 RX ADMIN — LIDOCAINE 1 PATCH: 4 PATCH TOPICAL at 08:29

## 2024-12-31 RX ADMIN — URSODIOL 300 MG: 300 CAPSULE ORAL at 12:04

## 2024-12-31 RX ADMIN — CYCLOBENZAPRINE 10 MG: 10 TABLET, FILM COATED ORAL at 08:28

## 2024-12-31 RX ADMIN — RIFAMPIN 300 MG: 300 CAPSULE ORAL at 07:35

## 2024-12-31 RX ADMIN — CYCLOBENZAPRINE 10 MG: 10 TABLET, FILM COATED ORAL at 12:04

## 2024-12-31 RX ADMIN — CALCIUM GLUCONATE 2 G: 20 INJECTION, SOLUTION INTRAVENOUS at 05:17

## 2024-12-31 RX ADMIN — URSODIOL 300 MG: 300 CAPSULE ORAL at 07:34

## 2024-12-31 RX ADMIN — INSULIN LISPRO 14 UNITS: 100 INJECTION, SOLUTION INTRAVENOUS; SUBCUTANEOUS at 06:31

## 2024-12-31 RX ADMIN — HYDROMORPHONE HYDROCHLORIDE 1 MG: 1 INJECTION, SOLUTION INTRAMUSCULAR; INTRAVENOUS; SUBCUTANEOUS at 03:48

## 2024-12-31 RX ADMIN — NICOTINE TRANSDERMAL SYSTEM 21 MG: 21 PATCH, EXTENDED RELEASE TRANSDERMAL at 06:42

## 2024-12-31 RX ADMIN — SEVELAMER CARBONATE 800 MG: 800 TABLET, FILM COATED ORAL at 12:04

## 2024-12-31 RX ADMIN — DEXTROSE MONOHYDRATE 25 G: 25 INJECTION, SOLUTION INTRAVENOUS at 17:33

## 2024-12-31 RX ADMIN — LEVOTHYROXINE SODIUM 250 MCG: 0.12 TABLET ORAL at 07:00

## 2024-12-31 ASSESSMENT — ENCOUNTER SYMPTOMS
CHILLS: 0
SHORTNESS OF BREATH: 0
ABDOMINAL PAIN: 0
NAUSEA: 0
VOMITING: 0
FEVER: 0
DIARRHEA: 0
BACK PAIN: 1
COUGH: 0

## 2024-12-31 ASSESSMENT — PAIN DESCRIPTION - PAIN TYPE
TYPE: ACUTE PAIN
TYPE: ACUTE PAIN
TYPE: CHRONIC PAIN
TYPE: CHRONIC PAIN;ACUTE PAIN
TYPE: CHRONIC PAIN;ACUTE PAIN
TYPE: ACUTE PAIN

## 2024-12-31 ASSESSMENT — FIBROSIS 4 INDEX: FIB4 SCORE: 1.65

## 2024-12-31 NOTE — ASSESSMENT & PLAN NOTE
Hemoglobin at baseline.  CBC ordered continue monitoring, transfuse if less than 8 due to history of CAD

## 2024-12-31 NOTE — ED NOTES
Pt now resting in bed, appears to be sleeping with even rise and fall of chest noted. No obvious signs of pain or distress, reposition self occasionally, bed in low position.

## 2024-12-31 NOTE — H&P
Hospital Medicine History & Physical Note    Date of Service  12/31/2024    Primary Care Physician  PAM Maxwell.    Code Status  DNAR/DNI    Chief Complaint  Chief Complaint   Patient presents with    Chest Pain     Pt BIB EMS for back pain that radiates to her chest x 3 days, sharp and shooting. Pt Hx: CKD on dialysis (M/W/F), liver failure, COPD, Cardiac stent x1         History of Presenting Illness  Anu Manuel is a 69 y.o. female who presented 12/31/2024 with back pain.  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   Comes in complaining of back pain for the third time since 12/21.  She has chronic lower back pain but this current back pain is located in the right upper back around her shoulder blade.  She says she was helping her friend hanging up frames a day or so before the pain began.  On previous ED visits she had a CTA thoracoabdominal aorta on 12/22 due to this upper back pain which was not concerning for acute pathology.  She also had a CT T-spine which showed extensive degenerative disc disease which is stable since prior CT scan and no acute abnormalities.    In the ED afebrile, blood pressure significantly elevated with systolic in the 180s on presentation.  Labs showed a potassium of 6.2, glucose at 800.  She says her last dialysis session was on 12/30 though she reported a different date to the EDP saying it was on 12/25.    She says she has been compliant with her medication and taking her insulin and is not sure why it was so elevated.  Upon questioning she says she has been feeling more confused lately and just feels out of it.    I discussed the plan of care with patient,  bedside RN, and edp .    Review of Systems  Review of Systems   Constitutional:  Negative for chills and fever.   Respiratory:  Negative for cough and shortness of breath.    Cardiovascular:  Negative for chest pain.   Gastrointestinal:  Negative for abdominal pain, diarrhea, nausea and vomiting.   Genitourinary:  Negative for dysuria and urgency.   Musculoskeletal:  Positive for back pain.       Past Medical History   has a past medical history of Accidental drug overdose (08/27/2012), Adverse effect of anesthesia, Anemia, Anesthesia, Arrhythmia, Arthritis, Bowel habit changes (More frequent), Breath shortness, Broken hip (HCC) (04/2024), Cataract (2022), Cigarette smoker (quit 2013), Dental disorder, depression (08/30/2016), Diabetes mellitus type 1 ([facility]) (1989), Dialysis patient ([facility]) (Short time due to a kidney injury from a infection), Emphysema of lung ([facility]), Encounter for long-term (current) use of insulin ([facility]) (09/25/2013), GI bleed, Heart burn, High cholesterol, Hypertension, Hypothyroidism, postsurgical (1970), Indigestion, Infectious disease, Jaundice (2021 Liver disease), Joint replacement, Liver disease, Liver failure (HCC), Myocardial infarct ([facility]) (2019), Pain, Polyneuropathy in diabetes(357.2) (06/10/2015), Renal disorder, Status post appendectomy, 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    Surgical History   has a past surgical history that includes hysterectomy, vaginal (2006); thyroid lobectomy (1973); lumpectomy (1976, 2005); cervical disk and fusion anterior (03/12/2008); tonsillectomy (1963); cervical fusion posterior (01/16/2009); hardware removal neuro (01/16/2009); neck exploration (01/16/2009); lumpectomy; lumbar laminectomy diskectomy (Right, 05/10/2016); shoulder decompression arthroscopic (06/17/2008); clavicle distal excision (06/17/2008); shoulder arthroscopy w/ rotator cuff repair (10/09/2008); pr njx  aa&/strd tfrml epi lumbar/sacral 1 level (Right, 2016); spinal cord stimulator (N/A, 10/26/2018); thoracic laminectomy (N/A, 10/26/2018); appendectomy (); pr ins/rplc spi npg/rcvr pocket (N/A, 2019); irrigation & debridement neuro (2020); cath placement (2020); pr ercp,diagnostic (N/A, 10/26/2021); pr upper gi endoscopy,biopsy (N/A, 10/26/2021); pr upper gi endoscopy,diagnosis (N/A, 10/26/2021); peter by laparoscopy (N/A, 10/28/2021); pr ercp,diagnostic (N/A, 2022); other cardiac surgery ( stents inserted); other abdominal surgery (); pr lap insert intraperitoneal catheter (2024); cataract phaco with iol; pb remove perm cannula/catheter (2024); av fistula creation (Left, 2024); and av fistula revision (Left, 2024).     Family History  family history includes Cancer in her father; Hypertension in her mother.   Family history reviewed with patient. There is no family history that is pertinent to the chief complaint.     Social History   reports that she has been smoking cigarettes. She has a 15 pack-year smoking history. She has never used smokeless tobacco. She reports that she does not currently use alcohol. She reports that she does not currently use drugs after having used the following drugs: Inhaled, Oral, and Marijuana.    Allergies  Allergies   Allergen Reactions    Tape Unspecified and Rash     Blisters, paper tape is ok  Other reaction(s): Rash       Medications  Prior to Admission Medications   Prescriptions Last Dose Informant Patient Reported? Taking?   Continuous Glucose Sensor (FREESTYLE PARISA 3 PLUS SENSOR) [facility] [place] No   Si Each every 14 days.   Patient taking differently: 1 Each every 14 days. Placed 2024   Cyanocobalamin (VITAMIN B-12 PO) 2024 Evening Patient Yes Yes   Sig: Take 1 Tablet by mouth every day.      Insulin Glargine, 1 Unit Dial, (TOUJEO SOLOSTAR) 300 UNIT/ML Solution Pen-injector 2024 Evening  Patient No Yes   Sig: Inject 45 Units under the skin every day.   Patient taking differently: Inject 30 Units under the skin every day.   Insulin Lispro-aabc, 1 U Dial, (LYUMJEV KWIKPEN) 100 UNIT/ML Solution Pen-injector 12/30/2024 Noon Patient No Yes   Sig: Inject 10 Units under the skin 3 times a day before meals.   SYNTHROID 125 MCG Tab 12/30/2024 Morning Patient No Yes   Sig: Take 2 Tablets by mouth every morning on an empty stomach.   Patient taking differently: Take 250 mcg by mouth every morning on an empty stomach. 2 tablets = 250mcg   VITAMIN D, ERGOCALCIFEROL, PO 12/30/2024 Evening Patient Yes Yes   Sig: Take 1 Tablet by mouth 2 times a day.      amLODIPine (NORVASC) 10 MG Tab 12/30/2024 Morning Patient Yes Yes   Sig: Take 10 mg by mouth every day.      atorvastatin (LIPITOR) 40 MG Tab 12/30/2024 Evening Patient No Yes   Sig: TAKE 1 TABLET BY MOUTH EVERY DAY IN THE EVENING   famotidine (PEPCID) 20 MG Tab 12/30/2024 Evening Patient No Yes   Sig: TAKE 1 TABLET BY MOUTH TWICE A DAY   Patient taking differently: Take 20 mg by mouth 2 times a day.   gabapentin (NEURONTIN) 100 MG Cap 12/30/2024 Noon Patient No Yes   Sig: Take 1 Capsule by mouth every Monday, Wednesday, and Friday.   lidocaine-prilocaine (EMLA) 2.5-2.5 % Cream 12/30/2024 Morning Patient Yes Yes   Sig: Apply 1 g topically as needed (AVF prior to dialysis). APPLY TO AFFECTED AREA AVF ACCESS 30-60 MINS PRIOR TO DIALYSIS TREATMENT WRAP IN SARAN WRAP   liothyronine (CYTOMEL) 5 MCG Tab 12/30/2024 Morning Patient No Yes   Sig: Take 1 Tablet by mouth every day.   metoprolol SR (TOPROL XL) 100 MG TABLET SR 24 HR 12/30/2024 Morning Patient Yes Yes   Sig: Take 100 mg by mouth every day.   omeprazole (PRILOSEC) 40 MG delayed-release capsule 12/30/2024 Morning Patient Yes Yes   Sig: Take 40 mg by mouth every day.      oxycodone (OXY-IR) 15 MG immediate release tablet Unknown Patient Yes No   Sig: Take 15 mg by mouth every four hours as needed for Severe Pain.       pantoprazole (PROTONIX) 40 MG Tablet Delayed Response 12/30/2024 Evening Patient No Yes   Sig: Take 1 Tablet by mouth every day.   riFAMPin (RIFADINE) 300 MG Cap 12/30/2024 Evening Patient Yes Yes   Sig: Take 300 mg by mouth 2 times a day.   senna-docusate (SENOKOT S) 8.6-50 MG Tab 12/30/2024 Morning Patient Yes Yes   Sig: Take 1 Tablet by mouth every day.      sevelamer (RENAGEL) 800 MG Tab Unknown Patient Yes No   Sig: Take 800 mg by mouth 3 times a day with meals.   traZODone (DESYREL) 150 MG Tab 12/30/2024 Evening Patient Yes Yes   Sig: Take 150 mg by mouth at bedtime.      ursodiol (ACTIGALL) 300 MG Cap 12/30/2024 Noon Patient Yes Yes   Sig: Take 1 Capsule by mouth 3 times a day.      venlafaxine (EFFEXOR-XR) 150 MG extended-release capsule 12/30/2024 Morning Patient Yes Yes   Sig: Take 150 mg by mouth every day.         Facility-Administered Medications: None       Physical Exam  Temp:  [36.7 °C (98 °F)] 36.7 °C (98 °F)  Pulse:  [74-84] 84  Resp:  [13-20] 18  BP: (140-185)/(80-96) 140/96  SpO2:  [94 %-98 %] 95 %  Blood Pressure : (!) 185/80   Temperature: 36.7 °C (98 °F)   Pulse: 74   Respiration: 14   Pulse Oximetry: 94 %       Physical Exam  Constitutional:       Appearance: Normal appearance.   HENT:      Head: Normocephalic and atraumatic.      Mouth/Throat:      Mouth: Mucous membranes are moist.      Pharynx: No oropharyngeal exudate or posterior oropharyngeal erythema.   Cardiovascular:      Rate and Rhythm: Normal rate and regular rhythm.      Pulses: Normal pulses.      Heart sounds: Normal heart sounds. No murmur heard.  Pulmonary:      Effort: Pulmonary effort is normal. No respiratory distress.      Breath sounds: Normal breath sounds.   Musculoskeletal:         General: No swelling or tenderness. Normal range of motion.      Comments: Muscle stiff around the right scapula and tender to palpation   Skin:     General: Skin is warm and dry.   Neurological:      General: No focal deficit present.       Mental Status: She is alert and oriented to person, place, and time.   Psychiatric:         Mood and Affect: Mood normal.         Laboratory:  Recent Labs     12/31/24 0320   WBC 11.7*   RBC 3.80*   HEMOGLOBIN 11.7*   HEMATOCRIT 38.2   .5*   MCH 30.8   MCHC 30.6*   RDW 56.9*   PLATELETCT 220   MPV 9.7     Recent Labs     12/31/24 0320   SODIUM 122*   POTASSIUM 6.2*   CHLORIDE 88*   CO2 18*   GLUCOSE 798*   BUN 28*   CREATININE 3.11*   CALCIUM 7.8*     Recent Labs     12/31/24  0320   ALTSGPT 12   ASTSGOT 23   ALKPHOSPHAT 558*   TBILIRUBIN 0.7   LIPASE 15   GLUCOSE 798*         Recent Labs     12/31/24 0320   NTPROBNP 3453*         Recent Labs     12/31/24 0320 12/31/24  0508   TROPONINT 77* 80*       Imaging:  DX-CHEST-PORTABLE (1 VIEW)   Final Result      No acute process.          X-Ray:  I have personally reviewed the images and compared with prior images. and My impression is: No acute process  EKG:  I have personally reviewed the images and compared with prior images. and My impression is: NSR    Assessment/Plan:  Justification for Admission Status  I anticipate this patient will require at least two midnights for appropriate medical management, necessitating inpatient admission because hyperkalemia    * Hyperkalemia- (present on admission)  Assessment & Plan  6.2 on presentation, history of ESRD.  She reported to me that her last dialysis session was 12/30 but told the EDP that her last dialysis was on 12/25  She follows with Dr. Carrera  Needs nephrology consult in the morning    Acute right-sided thoracic back pain- (present on admission)  Assessment & Plan  She has chronic low back pain for which she is on narcotics.  Presenting with acute right upper back pain around the scapula  On exam muscles around the scapula are tense and tender to palpation  She says she was helping a friend hang a picture frames prior to pain onset  CTA thoracoabdominal aorta done on 12/22 and his CT T-spine on the  same date not concerning for acute abnormality  Musculoskeletal pain due to recent strenuous activity for the patient  Difficult to control due to history of narcotic dependence due to chronic low back pain.  IV morphine added, muscle relaxant added    ESRD needing dialysis (HCC)- (present on admission)  Assessment & Plan  On HD MWF.  Nephrology consult in the morning  Renally dose all medication, avoid nephrotoxic agents    Hyponatremia- (present on admission)  Assessment & Plan  Chronic hyponatremia secondary to cirrhosis usually in the high 120s-low 130s  122 on presentation, she has been this low recently  BMP ordered to continue monitoring    Type 1 diabetes mellitus with hyperglycemia (HCC)- (present on admission)  Assessment & Plan  Glucose 800 on presentation, she says she has been taking her insulin.  She also says she has been more confused lately and has been confused with dates on interview with different providers  Not acidosis and not in DKA.  Possible HHS due to confusion though that would be rare in DM1.  Serum osmole's pending  Continue insulin glargine and sliding scale added while in the hospital  Frequent fingerstick checks until improvement in her glucose levels    Anemia due to chronic kidney disease, on chronic dialysis (HCC)- (present on admission)  Assessment & Plan  Hemoglobin at baseline.  CBC ordered continue monitoring, transfuse if less than 8 due to history of CAD    Other cirrhosis of liver (HCC)- (present on admission)  Assessment & Plan  Per notes from [facility] concern for primary sclerosing cholangitis based on liver biopsy but not based on imaging  Monitor volume status    Cholestatic liver disease- (present on admission)  Assessment & Plan  Continue ursodiol and rifampin for cholestatic pruritus    Hypothyroidism, postsurgical- (present on admission)  Assessment & Plan  Continue liothyronine and levothyroxine    Chronic low back pain- (present on admission)  Assessment &  Plan  Continue home narcotics    Dyslipidemia- (present on admission)  Assessment & Plan  Continue statin    Anxiety- (present on admission)  Assessment & Plan  Continue venlafaxine    Primary hypertension- (present on admission)  Assessment & Plan  Continue metoprolol, amlodipine    Tobacco abuse- (present on admission)  Assessment & Plan  Patient smokes 3/4 PPD.  Nicotine patch and/or gum ordered.   I discussed cessation with patient including starting on nicotine patch and/or gum on discharge.  I also discussed medications to help with cessation with patient including Wellbutrin and Chantix, has tried in the past and did not work.  Declined.  Smoking cessation discussed with patient for 4 minutes.      Patient is critically ill.   The patient continues to have: Hyperkalemia, hyponatremia, significant hyperglycemia with concern for HHS  If untreated there is a high chance of deterioration And eventually death.   The critical care that I am providing today is: As above.  Close monitoring on telemetry, repeat labs and treat potassium and glucose as needed  The critical that has been undertaken is medically complex.   There has been no overlap in critical care time.   Critical Care Time not including procedures: 67 minutes      VTE prophylaxis: heparin ppx

## 2024-12-31 NOTE — ED NOTES
"Med Rec completed per patient      Allergies reviewed: yes    Antibiotics in the past 30 days: no    Anticoagulant in past 14 days: no    Pharmacy patient utilizes: [facility]  [phone]    Patient receives dialysis treatments on Mondays, Wednesdays and Fridays at [facility] on [address] [phone].  Last treatment was Monday 12/30/2024    Patient stated she is not sure when she took her Oxycodone last because she can't find it.   Patient is not sure when she took her Sevelamer last, patient stated \"I havent seen that one come through yet\"     Patient taking Rifampin 300 mg   Last dose 12/31/2024 pm  "

## 2024-12-31 NOTE — ASSESSMENT & PLAN NOTE
Per notes from [facility] concern for primary sclerosing cholangitis based on liver biopsy but not based on imaging  Monitor volume status

## 2024-12-31 NOTE — ED NOTES
Bedside report from MINI Salinas. Pt on gurney in lowest, locked position, on RA, and continuous cardiac monitoring. Fall precautions in place, including fall risk sign. Pt updated on plan of care. No needs at this time. Call light within reach.     RUFINO JEAN RETURNED CALL. VERBALIZED THAT SHE RECOMMENDS KEEPING METOPROLOL AT ITS 
CURRENT DOSAGE.

## 2024-12-31 NOTE — ED NOTES
"Rechecked pt's BG, result 369. Pt was sleeping soundly, but when woken up pt started moaning and crying in pain, stating \"help me, help me I'm dying\".   "

## 2024-12-31 NOTE — PROGRESS NOTES
4 Eyes Skin Assessment Completed by MINI Camarillo and MINI Rodriguez.    Head WDL  Ears WDL  Nose WDL  Mouth WDL  Neck WDL  Breast/Chest WDL  Shoulder Blades WDL  Spine WDL  (R) Arm/Elbow/Hand WDL  (L) Arm/Elbow/Hand WDL  Abdomen WDL  Groin WDL  Scrotum/Coccyx/Buttocks Redness and Blanching  (R) Leg WDL  (L) Leg WDL  (R) Heel/Foot/Toe Redness and Blanching dry and cracked   (L) Heel/Foot/Toe Redness and Blanching dry and cracked          Devices In Places Tele Box, Blood Pressure Cuff, and Pulse Ox      Interventions In Place Pressure Redistribution Mattress    Possible Skin Injury No    Pictures Uploaded Into Epic N/A  Wound Consult Placed N/A  RN Wound Prevention Protocol Ordered No

## 2024-12-31 NOTE — ASSESSMENT & PLAN NOTE
6.2 on presentation, history of ESRD.  She reported to me that her last dialysis session was 12/30 but told the EDP that her last dialysis was on 12/25  She follows with Dr. Carrera  Needs nephrology consult in the morning

## 2024-12-31 NOTE — ED NOTES
Pt resting in bed, appears to be sleeping with even rise and fall of chest noted. No obvious signs of pain or distress, reposition self occasionally, bed in low position. Re-checked BG, result 316.

## 2024-12-31 NOTE — ED TRIAGE NOTES
"Chief Complaint   Patient presents with    Chest Pain     Pt BIB EMS for back pain that radiates to her chest x 3 days, sharp and shooting. Pt Hx: CKD on dialysis (M/W/F), liver failure, COPD, Cardiac stent x1       Pt transferred to [facility], states she has been out of her pain medication x 3 days. Pt states 10/10 radiating pain from back to left side of chest.    24 g placed on Right upper arm by EMS    Pt was given 75 mcg fentanyl in route. Call light in reach and fall precautions  in place.     BP (!) 181/86   Pulse 76   Resp 20   Ht 1.676 m (5' 6\")   Wt 56.7 kg (125 lb)   LMP 04/29/2005 (LMP Unknown)   SpO2 97%   BMI 20.18 kg/m²     "

## 2024-12-31 NOTE — ED NOTES
"Pt still complaining of 9/10 back and shoulder pain despite pain medication given, pt requesting more pain medicine. Admitting MD made aware. Pt crying loudly in her room, stating \"help me, help me I'm dying\".   "

## 2024-12-31 NOTE — PROGRESS NOTES
Hospitalist progress note:  69 y F with back pain. Hx of ESRD on HD, cirrhosis, CAD stenting. She reports severe back pain below shoulder. Prior CTA 12/22 negative for dissection, CT t spine stable at that time too.  Patient reportedly helping friend hang up some frames prior to pain starting, however she denies this to me at bedside. She is a bit sedated from pain medication.  Continue supportive care for pain. Lidocaine patch and flexeril ordered.  Opiates on board.  Nephrology consulted for continued dialysis while inpatient.  Noted to have hyperglycemia 800's and hyperkalemia on arrival. This has improved with insulin.  Follow up BMP pending, monitor K level closely. Telemetry monitoring for this.  Resume home lantus 30 U daily, continue insulin sliding scale.  Palliative care consulted per daughters request, relayed to me by nursing staff.

## 2024-12-31 NOTE — PROGRESS NOTES
Patient is currently in the ED at this time. Deferring monthly call to possibly next week until patient is back home after being discharged from the hospital.

## 2024-12-31 NOTE — ASSESSMENT & PLAN NOTE
On HD MWF.  Nephrology consult in the morning  Renally dose all medication, avoid nephrotoxic agents

## 2024-12-31 NOTE — ASSESSMENT & PLAN NOTE
Patient smokes 3/4 PPD.  Nicotine patch and/or gum ordered.   I discussed cessation with patient including starting on nicotine patch and/or gum on discharge.  I also discussed medications to help with cessation with patient including Wellbutrin and Chantix, has tried in the past and did not work.  Declined.  Smoking cessation discussed with patient for 4 minutes.

## 2024-12-31 NOTE — ED NOTES
Report given to MINI Rodriguez.   Pt transported to floor via hospital bed on RA and continuous cardiac monitor with RN.

## 2024-12-31 NOTE — ASSESSMENT & PLAN NOTE
Chronic hyponatremia secondary to cirrhosis usually in the high 120s-low 130s  122 on presentation, she has been this low recently  BMP ordered to continue monitoring

## 2024-12-31 NOTE — ED PROVIDER NOTES
"ER Provider Note    Scribed for Davin Tobar II, M.D. by Abilio Howard. 12/31/2024  3:42 AM    Primary Care Provider: ZOË Maxwell    CHIEF COMPLAINT   Chief Complaint   Patient presents with    Chest Pain     Pt BIB EMS for back pain that radiates to her chest x 3 days, sharp and shooting. Pt Hx: CKD on dialysis (M/W/F), liver failure, COPD, Cardiac stent x1       EXTERNAL RECORDS REVIEWED  Reviewed CTA complete of the aorta which shows no dissection 9 days ago.     PDMP: On 11/18/24 she was prescribed oxycodone 15 mg tablets, 180 quantity, with no prescriptions filled after that.     HPI/ROS  LIMITATION TO HISTORY   Select: : None  OUTSIDE HISTORIAN(S):  None    Anu Manuel is a 69 y.o. female who presents to the ED for evaluation of back pain radiating to the chest worsening 3 PM yesterday. She also reports shortness of breath. The patient states she has been unable to walk or stand due to pain. The patient reports she last had dialysis Monday, her next dialysis is Wednesday and is scheduled for dialysis M/W/F. The patient has been out of her oxycodone for the last two days. The patient has liver cirrhosis, COPD, cardiac stent placement, cholecystectomy.       PAST MEDICAL HISTORY  Past Medical History:   Diagnosis Date    Accidental drug overdose 08/27/2012    Adverse effect of anesthesia     in 2008 \"throat closes up\"\"anxiety\" ?laryngospasm, kept in ICU. Pt states no problems currently 2018.     Anemia     Anesthesia     in 2008 \"throat closes up\"\"anxiety\" ?laryngospasm, kept in ICU. Pt states no problems currently 2018.     Arrhythmia     A FIB    Arthritis     right shoulder, hands, OSTEO    Bowel habit changes More frequent    Breath shortness     Broken hip (HCC) 04/2024    Broken hip possibly the pelvis.  Inoperable    Cataract 2022    BILAT IOL    Cigarette smoker quit 2013    Dental disorder     upper denture    depression 08/30/2016    denies depression, states has anxiety " "and panic attacks    Diabetes mellitus type 1 (HCC) 1989    insulin    Dialysis patient (HCC) Short time due to a kidney injury from a infection    DIALYS[facility], [facility], [facility], [facility]    Emphysema of lung (HCC)     COPD    Encounter for long-term (current) use of insulin (HCC) 09/25/2013    GI bleed     Heart burn     High cholesterol     Hypertension     Hypothyroidism, postsurgical 1970    Indigestion     Infectious disease      had hepatitis C, tested neg.    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) 06/10/2015    Vertigo        SURGICAL HISTORY  Past Surgical History:   Procedure Laterality Date    AV FISTULA REVISION Left 12/5/2024    Procedure: LIGATION OF LEFT UPPER ARM DIALYSIS GRAFT;  Surgeon: Denis Brown M.D.;  Location: SURGERY [facility];  Service: General    PB REMOVE PERM CANNULA/CATHETER  09/23/2024    Procedure: REMOVAL OF PERITONEAL DIALYSIS CATHETER;  Surgeon: Denis Brown M.D.;  Location: [facility];  Service: General    AV FISTULA CREATION Left 09/23/2024    Procedure: CREATION OF LEFT UPPER EXTREMITY DIALYSIS GRAFT;  Surgeon: Denis Brown M.D.;  Location: [facility];  Service: General    PB LAP INSERT INTRAPERITONEAL CATHETER  06/20/2024    Procedure: LAPAROSCOPIC INSERTION OF PERITONEAL DIALYSIS CATHETER;  Surgeon: Denis Brown M.D.;  Location: [facility];  Service: General    ME ERCP,DIAGNOSTIC N/A 01/14/2022    Procedure: ERCP, DIAGNOSTIC - WITH SIGMOID COLON BIOPSY AND STENT REMOVAL;  Surgeon: Cr Haro M.D.;  Location: [facility]" [facility];  Service: Gastroenterology    THADDEUS BY LAPAROSCOPY N/A 10/28/2021    Procedure: CHOLECYSTECTOMY, LAPAROSCOPIC;  Surgeon: Tello Trammell M.D.;  [place];  Service: General    MA ERCP,DIAGNOSTIC N/A 10/26/2021    Procedure: ERCP (ENDOSCOPIC RETROGRADE CHOLANGIOPANCREATOGRAPHY);  Surgeon: Cr Haro M.D.;  Location: SURGERY [facility] DAY [facility];  Service: Gastroenterology    MA UPPER GI ENDOSCOPY,BIOPSY N/A 10/26/2021    Procedure: GASTROSCOPY, WITH BIOPSY;  Surgeon: Cr Haro M.D.;  Location: SURGERY SAME DAY [facility];  Service: Gastroenterology    MA UPPER GI ENDOSCOPY,DIAGNOSIS N/A 10/26/2021    Procedure: GASTROSCOPY;  Surgeon: Cr Haro M.D.;  Location: SURGERY SAME DAY [facility];  Service: Gastroenterology    CATH PLACEMENT  01/25/2020    Procedure: INSERTION, CATHETER PERM;  Surgeon: Rola Mendoza M.D.;  Location: SURGERY [facility];  Service: General    IRRIGATION & DEBRIDEMENT NEURO  01/19/2020    Procedure: IRRIGATION AND DEBRIDEMENT, WOUND THORACIC AND LUMBAR;  Surgeon: Ryan Roman M.D.;  Location: [facility];  Service: Neurosurgery    MA INS/RPLC SPI NPG/RCVR POCKET N/A 12/16/2019    Procedure: EXPLORATION AT THORACIC 8 - 9, REPLACEMENT OF  NEUROSTIMULATOR LEAD;  Surgeon: Ryan Roman M.D.;  Location: [facility];  Service: Neurosurgery    SPINAL CORD STIMULATOR N/A 10/26/2018    Procedure: SPINAL CORD STIMULATOR;  Surgeon: Ryan Roman M.D.;  Location: [facility];  Service: Neurosurgery    THORACIC LAMINECTOMY N/A 10/26/2018    Procedure: THORACIC LAMINECTOMY - FOR;  Surgeon: Ryan Roman M.D.;  Location: SURGERY [facility];  Service: Neurosurgery    MA NJX AA&/STRD TFRML EPI LUMBAR/SACRAL 1 LEVEL Right 08/31/2016    Procedure: INJ-FORAMEN EPI LUM/SAC SNGL L4-5;  Surgeon: Sukhi Godfrey M.D.;  Location: SURGERY [facility];  Service: Pain Management    LUMBAR LAMINECTOMY DISKECTOMY Right 05/10/2016     Procedure: LUMBAR L4-5 HEMILAMINECTOMY DISKECTOMY ;  Surgeon: Arnold Keyes M.D.;  Location: SURGERY [facility];  Service:     CERVICAL FUSION POSTERIOR  01/16/2009    Performed by TARA CONTRERAS at SURGERY [facility]    HARDWARE REMOVAL NEURO  01/16/2009    Performed by TARA CONTRERAS at SURGERY [facility]    NECK EXPLORATION  01/16/2009    Performed by TARA CONTRERAS at SURGERY [facility]    SHOULDER ARTHROSCOPY W/ ROTATOR CUFF REPAIR  10/09/2008    Performed by SHERLY CASTANEDA at [facility]    SHOULDER DECOMPRESSION ARTHROSCOPIC  06/17/2008    Performed by SHERLY CASTANEDA at [facility]    CLAVICLE DISTAL EXCISION  06/17/2008    Performed by SHERLY CASTANEDA at [facility]    CERVICAL DISK AND FUSION ANTERIOR  03/12/2008    HYSTERECTOMY, VAGINAL  2006    PARTIAL    APPENDECTOMY  2004    THYROID LOBECTOMY  1973    TONSILLECTOMY  1963    CATARACT PHACO WITH IOL      LUMPECTOMY  1976, 2005    Breast     LUMPECTOMY      OTHER ABDOMINAL SURGERY  2004    OTHER CARDIAC SURGERY  2020 stents inserted     FAMILY HISTORY  Family History   Problem Relation Age of Onset    Hypertension Mother     Cancer Father      SOCIAL HISTORY   reports that she has been smoking cigarettes. She has a 15 pack-year smoking history. She has never used smokeless tobacco. She reports that she does not currently use alcohol. She reports that she does not currently use drugs after having used the following drugs: Inhaled, Oral, and Marijuana.    CURRENT MEDICATIONS  Previous Medications    AMLODIPINE (NORVASC) 10 MG TAB    Take 10 mg by mouth every day.       ATORVASTATIN (LIPITOR) 40 MG TAB    TAKE 1 TABLET BY MOUTH EVERY DAY IN THE EVENING    CONTINUOUS GLUCOSE SENSOR (FREESTYLE PARISA 3 PLUS SENSOR) MISC    1 Each every 14 days.    CYANOCOBALAMIN (VITAMIN B-12 PO)    Take 1 Tablet by mouth every day.       FAMOTIDINE (PEPCID) 20 MG TAB    TAKE 1 TABLET BY MOUTH TWICE A DAY    GABAPENTIN  "(NEURONTIN) 100 MG CAP    Take 1 Capsule by mouth every Monday, Wednesday, and Friday.    INSULIN GLARGINE, 1 UNIT DIAL, (TOUJEO SOLOSTAR) 300 UNIT/ML SOLUTION PEN-INJECTOR    Inject 45 Units under the skin every day.    INSULIN LISPRO-AABC, 1 U DIAL, (LYUMJEV KWIKPEN) 100 UNIT/ML SOLUTION PEN-INJECTOR    Inject 10 Units under the skin 3 times a day before meals.    LIDOCAINE-PRILOCAINE (EMLA) 2.5-2.5 % CREAM    Apply 1 g topically as needed (AVF prior to dialysis). APPLY TO AFFECTED AREA AVF ACCESS 30-60 MINS PRIOR TO DIALYSIS TREATMENT WRAP IN SARAN WRAP    LIOTHYRONINE (CYTOMEL) 5 MCG TAB    Take 1 Tablet by mouth every day.    METOPROLOL SR (TOPROL XL) 100 MG TABLET SR 24 HR    Take 150 mg by mouth every day. 1.5 tablets = 150mg    OMEPRAZOLE (PRILOSEC) 40 MG DELAYED-RELEASE CAPSULE    Take 40 mg by mouth every day.       ONDANSETRON (ZOFRAN ODT) 8 MG TABLET DISPERSIBLE    Take 8 mg by mouth every 8 hours as needed for Nausea.       OXYCODONE (OXY-IR) 15 MG IMMEDIATE RELEASE TABLET    Take 15 mg by mouth every four hours as needed for Severe Pain.       PANTOPRAZOLE (PROTONIX) 40 MG TABLET DELAYED RESPONSE    Take 1 Tablet by mouth every day.    RIFAMPIN (RIFADINE) 300 MG CAP    Take 300 mg by mouth 2 times a day.    SENNA-DOCUSATE (SENOKOT S) 8.6-50 MG TAB    Take 1 Tablet by mouth every day.       SYNTHROID 125 MCG TAB    Take 2 Tablets by mouth every morning on an empty stomach.    TRAZODONE (DESYREL) 150 MG TAB    Take 150 mg by mouth at bedtime.       URSODIOL (ACTIGALL) 300 MG CAP    Take 1 Capsule by mouth 3 times a day.       VENLAFAXINE (EFFEXOR-XR) 150 MG EXTENDED-RELEASE CAPSULE    Take 150 mg by mouth every day.       VITAMIN D, ERGOCALCIFEROL, PO    Take 1 Tablet by mouth 2 times a day.        ALLERGIES  Tape    PHYSICAL EXAM  BP (!) 181/86   Pulse 76   Resp 20   Ht 1.676 m (5' 6\")   Wt 56.7 kg (125 lb)   LMP 04/29/2005 (LMP Unknown)   SpO2 97%   BMI 20.18 kg/m²   Physical Exam  Vitals and " nursing note reviewed.   Constitutional:       Comments: When I came to room she was screaming out for someone to help her.  And while is in the room she would answer questions and normal tone voice but then randomly would yell out saying she was in pain.   HENT:      Nose: Nose normal.      Mouth/Throat:      Mouth: Mucous membranes are moist.   Eyes:      Extraocular Movements: Extraocular movements intact.      Pupils: Pupils are equal, round, and reactive to light.   Cardiovascular:      Rate and Rhythm: Normal rate and regular rhythm.   Pulmonary:      Effort: Pulmonary effort is normal.      Breath sounds: Normal breath sounds.   Musculoskeletal:      Comments: No single specific area of tenderness on the back.  She says it hurts everywhere that I touch.  She is moving her lower extremities with normal strength.  Extremities her well-perfused distally.  Left upper extremity AV fistula.   Neurological:      General: No focal deficit present.      Mental Status: She is alert and oriented to person, place, and time.      Cranial Nerves: No cranial nerve deficit.      Motor: No weakness.   Psychiatric:      Comments: Labile mood          DIAGNOSTIC STUDIES    EKG/LABS  Results for orders placed or performed during the hospital encounter of 12/31/24   CBC with Differential    Collection Time: 12/31/24  3:20 AM   Result Value Ref Range    WBC 11.7 (H) 4.8 - 10.8 K/uL    RBC 3.80 (L) 4.20 - 5.40 M/uL    Hemoglobin 11.7 (L) 12.0 - 16.0 g/dL    Hematocrit 38.2 37.0 - 47.0 %    .5 (H) 81.4 - 97.8 fL    MCH 30.8 27.0 - 33.0 pg    MCHC 30.6 (L) 32.2 - 35.5 g/dL    RDW 56.9 (H) 35.9 - 50.0 fL    Platelet Count 220 164 - 446 K/uL    MPV 9.7 9.0 - 12.9 fL    Neutrophils-Polys 86.90 (H) 44.00 - 72.00 %    Lymphocytes 4.40 (L) 22.00 - 41.00 %    Monocytes 6.30 0.00 - 13.40 %    Eosinophils 0.40 0.00 - 6.90 %    Basophils 0.90 0.00 - 1.80 %    Immature Granulocytes 1.10 (H) 0.00 - 0.90 %    Nucleated RBC 0.00 0.00 - 0.20  /100 WBC    Neutrophils (Absolute) 10.14 (H) 1.82 - 7.42 K/uL    Lymphs (Absolute) 0.52 (L) 1.00 - 4.80 K/uL    Monos (Absolute) 0.74 0.00 - 0.85 K/uL    Eos (Absolute) 0.05 0.00 - 0.51 K/uL    Baso (Absolute) 0.11 0.00 - 0.12 K/uL    Immature Granulocytes (abs) 0.13 (H) 0.00 - 0.11 K/uL    NRBC (Absolute) 0.00 K/uL   Complete Metabolic Panel (CMP)    Collection Time: 12/31/24  3:20 AM   Result Value Ref Range    Sodium 122 (L) 135 - 145 mmol/L    Potassium 6.2 (H) 3.6 - 5.5 mmol/L    Chloride 88 (L) 96 - 112 mmol/L    Co2 18 (L) 20 - 33 mmol/L    Anion Gap 16.0 7.0 - 16.0    Glucose 798 (HH) 65 - 99 mg/dL    Bun 28 (H) 8 - 22 mg/dL    Creatinine 3.11 (H) 0.50 - 1.40 mg/dL    Calcium 7.8 (L) 8.5 - 10.5 mg/dL    Correct Calcium 8.7 8.5 - 10.5 mg/dL    AST(SGOT) 23 12 - 45 U/L    ALT(SGPT) 12 2 - 50 U/L    Alkaline Phosphatase 558 (H) 30 - 99 U/L    Total Bilirubin 0.7 0.1 - 1.5 mg/dL    Albumin 2.9 (L) 3.2 - 4.9 g/dL    Total Protein 7.1 6.0 - 8.2 g/dL    Globulin 4.2 (H) 1.9 - 3.5 g/dL    A-G Ratio 0.7 g/dL   proBrain Natriuretic Peptide, NT (BNP)    Collection Time: 12/31/24  3:20 AM   Result Value Ref Range    NT-proBNP 3453 (H) 0 - 125 pg/mL   Troponins NOW    Collection Time: 12/31/24  3:20 AM   Result Value Ref Range    Troponin T 77 (H) 6 - 19 ng/L   LIPASE    Collection Time: 12/31/24  3:20 AM   Result Value Ref Range    Lipase 15 11 - 82 U/L   ESTIMATED GFR    Collection Time: 12/31/24  3:20 AM   Result Value Ref Range    GFR (CKD-EPI) 16 (A) >60 mL/min/1.73 m 2   BETA-HYDROXYBUTYRIC ACID    Collection Time: 12/31/24  3:20 AM   Result Value Ref Range    beta-Hydroxybutyric Acid 0.99 (H) 0.02 - 0.27 mmol/L   EKG    Collection Time: 12/31/24  4:12 AM   Result Value Ref Range    Report       [facility] Emergency Dept.    Test Date:  2024-12-31  Pt Name:    KALPANA BENJAMÍN               Department: ER  MRN:        [id number]                      Room:        21  Gender:     Female                        Technician: [id number]  :        [id number]                   Requested By:ER TRIAGE PROTOCOL  Order #:    [id number]                    Reading MD: Davin Tobar II, MD    Measurements  Intervals                                Axis  Rate:       75                           P:          66  KS:         148                          QRS:        40  QRSD:       100                          T:          75  QT:         401  QTc:        448    Interpretive Statements  Sinus rhythm  Multiple ventricular premature complexes  Probable left atrial enlargement  No ST elevation or depression. Normal twaves  Impression: Sinus rhythm with ST changes. PVCs present.  Compared to ECG 2024 22:55:27  Ventricular premature complex(es) now present  Left ventricular hypertr ophy no longer present  Electronically Signed On 2024 04:12:11 PST by Davin Tobar II, MD     Troponins in two (2) hours    Collection Time: 24  5:08 AM   Result Value Ref Range    Troponin T 80 (H) 6 - 19 ng/L   VENOUS BLOOD GAS    Collection Time: 24  5:08 AM   Result Value Ref Range    Venous Bg Ph 7.37 7.31 - 7.45    Venous Bg Ph Temp Corrected 7.37 7.31 - 7.45    Venous Bg Pco2 40.2 38.0 - 54.0 mmHg    Venous Bg Pco2 Temp Corrected 39.7 38.0 - 54.0 mmHg    Venous Bg Po2 57.7 (H) 23.0 - 48.0 mmHg    Venous Bg Po2 Temp Corrected 56.5 (H) 23.0 - 48.0 mmHg    Venous Bg O2 Saturation 88.0 (H) 60.0 - 85.0 %    Venous Bg Hco3 23 22 - 29 mmol/L    Venous Bg Base Excess -3 (L) -2 - 3 mmol/L    Body Temp 36.7 Centigrade    O2 Therapy 0 lts.    POCT glucose device results    Collection Time: 24  5:45 AM   Result Value Ref Range    POC Glucose, Blood >600 (HH) 65 - 99 mg/dL     *Note: Due to a large number of results and/or encounters for the requested time period, some results have not been displayed. A complete set of results can be found in Results Review.     I have independently interpreted this EKG     RADIOLOGY/PROCEDURES    The attending emergency physician has independently interpreted the diagnostic imaging associated with this visit and am waiting the final reading from the radiologist.   My preliminary interpretation is a follows: CXR no mediastinal widening    Radiologist interpretation:  DX-CHEST-PORTABLE (1 VIEW)   Final Result      No acute process.        COURSE & MEDICAL DECISION MAKING     ASSESSMENT, COURSE AND PLAN  Care Narrative:     3:42 AM - This is an emergency department evaluation of a 69 y.o. female who presents with back pain radiating to the chest worsening at 3 PM yesterday.   She also recently had full imaging of the aorta 6 days ago.  I think is unlikely she is having aortic dissection.  EKG does not look consistent with MI.  Troponins pending.  This could be a chronic pain.  She recently ran out of oxycodone and was taking large quantity of these for several months.  Per triage protocol chest pain workup initiated.  Ordered dilaudid 1 mg IV for pain. Cbc with diff, cmp, proBNP, Troponins, EKG, and DX-chest. Reviewed CXR which shows no mediastinal widening.    4:23 AM - Reviewed labs which show the patient's troponin is 77. This is lower than her previous troponin of 123 on 12/22/24.  Rapid improvement in pain after receiving Dilaudid.    4:31 AM - The patient has a BGL of 798.  Bicarb just 18.  White blood cell count 11.7.    4:43 AM - I discussed the patient's case and the above findings with Pavel, PharmD, for insulin infusion. Plan to treat hyperkalemia with calcium and insulin without dextrose since her glucose is high. Will put in a request for ICU for insulin infusion.     5:12 AM - I discussed the patient's case and the above findings with Dr. Kraft (Intensivist) who said based on having borderline beta-hydroxybutyric acid and bicarb, we can try calcium glucose with push dose of insulin and she can be suitable for the floor.     6:09 AM -VBG returned with a normal pH.  I discussed the  patient's case and the above findings with Dr. Whitley (Hospitalist) who agrees to evaluate the patient for hospitalization.      6:30 AM - The patient was reevaluated at bedside. The patient states she has been taking her medications as prescribed and has not missed dialysis. Patient says she is taking her insulin and this is hard to imagine given at this this high.  She says she has not been doing anything but sitting at home and pain.  She now states that last dialysis was yesterday.    CRITICAL CARE  The very real possibilty of a deterioration of this patient's condition required the highest level of my preparedness for sudden, emergent intervention.  I provided critical care services, which included medication orders, frequent reevaluations of the patient's condition and response to treatment, ordering and reviewing test results, and discussing the case with various consultants.  The critical care time associated with the care of the patient was 35 minutes. Review chart for interventions. This time is exclusive of any other billable procedures.      PROBLEM LIST  # Hyperglycemia    # Hyperkalemia    # Torso pain.  Has mention that she has pain in her back, her chest, abdomen during time here in the ER.  Besides elevated glucose workup does not reveal significant changes such as elevated white count, or very high troponin.  Chest x-ray does not suggest dissection.  Pain would rapidly improve after Dilaudid after couple hours needed redosing.  This could be manifestation of opiate withdrawal.  Anticipate further evaluation while inpatient and being treated for hyperglycemia and hyperkalemia.    # Dialysis patient   -Reports having dialysis yesterday and may need it today because of elevated potassium.    DISPOSITION AND DISCUSSIONS  I have discussed management of the patient with the following physicians and MILAN's:  Dr. Kraft (Intensivist), Dr. Whitley (Hospitalist)    Discussion of management with other [facility] or  appropriate source(s): Lulu Zhao      DISPOSITION:  Patient will be hospitalized by Dr. Whitley in guarded condition.     FINAL DIAGNOSIS  1. Hyperglycemia    2. Bilateral low back pain without sciatica, unspecified chronicity    3. Hyperkalemia    4. Dialysis patient (HCC)    +Critical Care, 35 minutes      Abilio MCKEON (Scribe), am scribing for, and in the presence of, MARY Patel II.    Electronically signed by: Abilio Howard (Scribkayla), 12/31/2024    Davin MCKEON II, M* personally performed the services described in this documentation, as scribed by Abilio Howard in my presence, and it is both accurate and complete.     The note accurately reflects work and decisions made by me.  Davin Tobar II, M.D.  12/31/2024  6:53 AM

## 2024-12-31 NOTE — ASSESSMENT & PLAN NOTE
She has chronic low back pain for which she is on narcotics.  Presenting with acute right upper back pain around the scapula  On exam muscles around the scapula are tense and tender to palpation  She says she was helping a friend hang a picture frames prior to pain onset  CTA thoracoabdominal aorta done on 12/22 and his CT T-spine on the same date not concerning for acute abnormality  Musculoskeletal pain due to recent strenuous activity for the patient  Difficult to control due to history of narcotic dependence due to chronic low back pain.  IV morphine added, muscle relaxant added

## 2024-12-31 NOTE — ASSESSMENT & PLAN NOTE
Glucose 800 on presentation, she says she has been taking her insulin.  She also says she has been more confused lately and has been confused with dates on interview with different providers  Not acidosis and not in DKA.  Possible HHS due to confusion though that would be rare in DM1.  Serum osmole's pending  Continue insulin glargine and sliding scale added while in the hospital  Frequent fingerstick checks until improvement in her glucose levels

## 2025-01-01 ENCOUNTER — APPOINTMENT (OUTPATIENT)
Dept: RADIOLOGY | Facility: MEDICAL CENTER | Age: 70
DRG: 637 | End: 2025-01-01
Attending: INTERNAL MEDICINE
Payer: MEDICARE

## 2025-01-01 ENCOUNTER — HOME CARE VISIT (OUTPATIENT)
Dept: HOME HEALTH SERVICES | Facility: HOME HEALTHCARE | Age: 70
End: 2025-01-01
Payer: MEDICARE

## 2025-01-01 ENCOUNTER — APPOINTMENT (OUTPATIENT)
Dept: RADIOLOGY | Facility: MEDICAL CENTER | Age: 70
End: 2025-01-01
Attending: EMERGENCY MEDICINE
Payer: MEDICARE

## 2025-01-01 ENCOUNTER — APPOINTMENT (OUTPATIENT)
Dept: RADIOLOGY | Facility: MEDICAL CENTER | Age: 70
DRG: 637 | End: 2025-01-01
Attending: EMERGENCY MEDICINE
Payer: MEDICARE

## 2025-01-01 ENCOUNTER — APPOINTMENT (OUTPATIENT)
Dept: RADIOLOGY | Facility: MEDICAL CENTER | Age: 70
DRG: 637 | End: 2025-01-01
Attending: STUDENT IN AN ORGANIZED HEALTH CARE EDUCATION/TRAINING PROGRAM
Payer: MEDICARE

## 2025-01-01 ENCOUNTER — HOSPITAL ENCOUNTER (OUTPATIENT)
Facility: MEDICAL CENTER | Age: 70
End: 2025-02-11
Attending: EMERGENCY MEDICINE | Admitting: INTERNAL MEDICINE
Payer: MEDICARE

## 2025-01-01 ENCOUNTER — PATIENT OUTREACH (OUTPATIENT)
Dept: HEALTH INFORMATION MANAGEMENT | Facility: OTHER | Age: 70
End: 2025-01-01
Payer: MEDICARE

## 2025-01-01 ENCOUNTER — DOCUMENTATION (OUTPATIENT)
Dept: MEDICAL GROUP | Facility: PHYSICIAN GROUP | Age: 70
End: 2025-01-01
Payer: MEDICARE

## 2025-01-01 ENCOUNTER — HOME HEALTH ADMISSION (OUTPATIENT)
Dept: HOME HEALTH SERVICES | Facility: HOME HEALTHCARE | Age: 70
End: 2025-01-01
Payer: MEDICARE

## 2025-01-01 ENCOUNTER — HOSPITAL ENCOUNTER (EMERGENCY)
Facility: MEDICAL CENTER | Age: 70
End: 2025-02-07
Attending: EMERGENCY MEDICINE
Payer: MEDICARE

## 2025-01-01 ENCOUNTER — PATIENT OUTREACH (OUTPATIENT)
Dept: SCHEDULING | Facility: IMAGING CENTER | Age: 70
End: 2025-01-01
Payer: MEDICARE

## 2025-01-01 ENCOUNTER — TELEPHONE (OUTPATIENT)
Dept: CARDIOLOGY | Facility: MEDICAL CENTER | Age: 70
End: 2025-01-01
Payer: MEDICARE

## 2025-01-01 ENCOUNTER — APPOINTMENT (OUTPATIENT)
Dept: RADIOLOGY | Facility: MEDICAL CENTER | Age: 70
DRG: 637 | End: 2025-01-01
Attending: HOSPITALIST
Payer: MEDICARE

## 2025-01-01 VITALS
HEART RATE: 88 BPM | OXYGEN SATURATION: 98 % | RESPIRATION RATE: 16 BRPM | HEIGHT: 66 IN | SYSTOLIC BLOOD PRESSURE: 110 MMHG | BODY MASS INDEX: 19.77 KG/M2 | DIASTOLIC BLOOD PRESSURE: 72 MMHG | TEMPERATURE: 98.6 F | WEIGHT: 123 LBS

## 2025-01-01 VITALS
TEMPERATURE: 86.4 F | RESPIRATION RATE: 18 BRPM | HEART RATE: 51 BPM | OXYGEN SATURATION: 95 % | SYSTOLIC BLOOD PRESSURE: 69 MMHG | WEIGHT: 123 LBS | BODY MASS INDEX: 19.85 KG/M2 | DIASTOLIC BLOOD PRESSURE: 35 MMHG

## 2025-01-01 VITALS
RESPIRATION RATE: 18 BRPM | TEMPERATURE: 97 F | DIASTOLIC BLOOD PRESSURE: 82 MMHG | WEIGHT: 124 LBS | OXYGEN SATURATION: 92 % | SYSTOLIC BLOOD PRESSURE: 158 MMHG | BODY MASS INDEX: 19.93 KG/M2 | HEART RATE: 79 BPM | HEIGHT: 66 IN

## 2025-01-01 DIAGNOSIS — E10.9 TYPE 1 DIABETES MELLITUS ON INSULIN THERAPY (HCC): ICD-10-CM

## 2025-01-01 DIAGNOSIS — E78.5 DYSLIPIDEMIA: ICD-10-CM

## 2025-01-01 DIAGNOSIS — E89.0 HYPOTHYROIDISM, POSTSURGICAL: ICD-10-CM

## 2025-01-01 DIAGNOSIS — T82.838A BLEEDING DUE TO DIALYSIS CATHETER PLACEMENT, INITIAL ENCOUNTER (HCC): ICD-10-CM

## 2025-01-01 DIAGNOSIS — Z99.2 ESRD NEEDING DIALYSIS (HCC): ICD-10-CM

## 2025-01-01 DIAGNOSIS — N18.6 ESRD NEEDING DIALYSIS (HCC): ICD-10-CM

## 2025-01-01 LAB
ALBUMIN SERPL BCP-MCNC: 3.3 G/DL (ref 3.2–4.9)
ALBUMIN SERPL BCP-MCNC: 3.6 G/DL (ref 3.2–4.9)
ALBUMIN/GLOB SERPL: 0.8 G/DL
ALBUMIN/GLOB SERPL: 1 G/DL
ALP SERPL-CCNC: 1115 U/L (ref 30–99)
ALP SERPL-CCNC: 1663 U/L (ref 30–99)
ALT SERPL-CCNC: 63 U/L (ref 2–50)
ALT SERPL-CCNC: 70 U/L (ref 2–50)
AMMONIA PLAS-SCNC: 186 UMOL/L (ref 11–45)
ANION GAP SERPL CALC-SCNC: 11 MMOL/L (ref 7–16)
ANION GAP SERPL CALC-SCNC: 14 MMOL/L (ref 7–16)
ANION GAP SERPL CALC-SCNC: 50 MMOL/L (ref 7–16)
ANISOCYTOSIS BLD QL SMEAR: ABNORMAL
APTT PPP: 29 SEC (ref 24.7–36)
AST SERPL-CCNC: 177 U/L (ref 12–45)
AST SERPL-CCNC: 74 U/L (ref 12–45)
BASOPHILS # BLD AUTO: 0 % (ref 0–1.8)
BASOPHILS # BLD AUTO: 0.9 % (ref 0–1.8)
BASOPHILS # BLD: 0 K/UL (ref 0–0.12)
BASOPHILS # BLD: 0.1 K/UL (ref 0–0.12)
BILIRUB SERPL-MCNC: 0.4 MG/DL (ref 0.1–1.5)
BILIRUB SERPL-MCNC: 0.5 MG/DL (ref 0.1–1.5)
BUN SERPL-MCNC: 108 MG/DL (ref 8–22)
BUN SERPL-MCNC: 18 MG/DL (ref 8–22)
BUN SERPL-MCNC: 31 MG/DL (ref 8–22)
BURR CELLS BLD QL SMEAR: NORMAL
CALCIUM ALBUM COR SERPL-MCNC: 8.6 MG/DL (ref 8.5–10.5)
CALCIUM ALBUM COR SERPL-MCNC: 9.5 MG/DL (ref 8.5–10.5)
CALCIUM SERPL-MCNC: 8.3 MG/DL (ref 8.5–10.5)
CALCIUM SERPL-MCNC: 8.5 MG/DL (ref 8.5–10.5)
CALCIUM SERPL-MCNC: 8.9 MG/DL (ref 8.4–10.2)
CHLORIDE SERPL-SCNC: 78 MMOL/L (ref 96–112)
CHLORIDE SERPL-SCNC: 97 MMOL/L (ref 96–112)
CHLORIDE SERPL-SCNC: 98 MMOL/L (ref 96–112)
CO2 SERPL-SCNC: 22 MMOL/L (ref 20–33)
CO2 SERPL-SCNC: 25 MMOL/L (ref 20–33)
CO2 SERPL-SCNC: <2 MMOL/L (ref 20–33)
CREAT SERPL-MCNC: 1.98 MG/DL (ref 0.5–1.4)
CREAT SERPL-MCNC: 3.62 MG/DL (ref 0.5–1.4)
CREAT SERPL-MCNC: 7.01 MG/DL (ref 0.5–1.4)
CRP SERPL HS-MCNC: 2.54 MG/DL (ref 0–0.75)
EOSINOPHIL # BLD AUTO: 0 K/UL (ref 0–0.51)
EOSINOPHIL # BLD AUTO: 0.2 K/UL (ref 0–0.51)
EOSINOPHIL NFR BLD: 0 % (ref 0–6.9)
EOSINOPHIL NFR BLD: 1.8 % (ref 0–6.9)
ERYTHROCYTE [DISTWIDTH] IN BLOOD BY AUTOMATED COUNT: 53.1 FL (ref 35.9–50)
ERYTHROCYTE [DISTWIDTH] IN BLOOD BY AUTOMATED COUNT: 56.9 FL (ref 35.9–50)
ERYTHROCYTE [DISTWIDTH] IN BLOOD BY AUTOMATED COUNT: 69.3 FL (ref 35.9–50)
ERYTHROCYTE [SEDIMENTATION RATE] IN BLOOD BY WESTERGREN METHOD: 79 MM/HOUR (ref 0–25)
FIBRINOGEN PPP-MCNC: 519 MG/DL (ref 215–460)
GFR SERPLBLD CREATININE-BSD FMLA CKD-EPI: 13 ML/MIN/1.73 M 2
GFR SERPLBLD CREATININE-BSD FMLA CKD-EPI: 27 ML/MIN/1.73 M 2
GFR SERPLBLD CREATININE-BSD FMLA CKD-EPI: 6 ML/MIN/1.73 M 2
GLOBULIN SER CALC-MCNC: 3.6 G/DL (ref 1.9–3.5)
GLOBULIN SER CALC-MCNC: 3.9 G/DL (ref 1.9–3.5)
GLUCOSE BLD STRIP.AUTO-MCNC: 101 MG/DL (ref 65–99)
GLUCOSE BLD STRIP.AUTO-MCNC: 103 MG/DL (ref 65–99)
GLUCOSE BLD STRIP.AUTO-MCNC: 106 MG/DL (ref 65–99)
GLUCOSE BLD STRIP.AUTO-MCNC: 106 MG/DL (ref 65–99)
GLUCOSE BLD STRIP.AUTO-MCNC: 108 MG/DL (ref 65–99)
GLUCOSE BLD STRIP.AUTO-MCNC: 129 MG/DL (ref 65–99)
GLUCOSE BLD STRIP.AUTO-MCNC: 181 MG/DL (ref 65–99)
GLUCOSE BLD STRIP.AUTO-MCNC: 205 MG/DL (ref 65–99)
GLUCOSE BLD STRIP.AUTO-MCNC: 24 MG/DL (ref 65–99)
GLUCOSE BLD STRIP.AUTO-MCNC: 30 MG/DL (ref 65–99)
GLUCOSE BLD STRIP.AUTO-MCNC: 49 MG/DL (ref 65–99)
GLUCOSE BLD STRIP.AUTO-MCNC: 60 MG/DL (ref 65–99)
GLUCOSE SERPL-MCNC: 224 MG/DL (ref 65–99)
GLUCOSE SERPL-MCNC: 36 MG/DL (ref 65–99)
GLUCOSE SERPL-MCNC: 915 MG/DL (ref 65–99)
HAV IGM SERPL QL IA: NONREACTIVE
HBV CORE IGM SER QL: NONREACTIVE
HBV SURFACE AB SERPL IA-ACNC: 13.5 MIU/ML (ref 0–10)
HBV SURFACE AG SER QL: NONREACTIVE
HCT VFR BLD AUTO: 29 % (ref 37–47)
HCT VFR BLD AUTO: 33.9 % (ref 37–47)
HCT VFR BLD AUTO: 36.4 % (ref 37–47)
HCV AB SER QL: NONREACTIVE
HGB BLD-MCNC: 11.1 G/DL (ref 12–16)
HGB BLD-MCNC: 11.6 G/DL (ref 12–16)
HGB BLD-MCNC: 8.1 G/DL (ref 12–16)
IMM GRANULOCYTES # BLD AUTO: 0.03 K/UL (ref 0–0.11)
IMM GRANULOCYTES NFR BLD AUTO: 0.3 % (ref 0–0.9)
INR PPP: 0.81 (ref 0.87–1.13)
INR PPP: 1.54 (ref 0.87–1.13)
LACTATE SERPL-SCNC: 10.5 MMOL/L (ref 0.5–2)
LYMPHOCYTES # BLD AUTO: 0.95 K/UL (ref 1–4.8)
LYMPHOCYTES # BLD AUTO: 1.64 K/UL (ref 1–4.8)
LYMPHOCYTES NFR BLD: 8.3 % (ref 22–41)
LYMPHOCYTES NFR BLD: 8.5 % (ref 22–41)
MACROCYTES BLD QL SMEAR: ABNORMAL
MANUAL DIFF BLD: NORMAL
MCH RBC QN AUTO: 30.3 PG (ref 27–33)
MCH RBC QN AUTO: 31.9 PG (ref 27–33)
MCH RBC QN AUTO: 32 PG (ref 27–33)
MCHC RBC AUTO-ENTMCNC: 27.9 G/DL (ref 32.2–35.5)
MCHC RBC AUTO-ENTMCNC: 31.9 G/DL (ref 32.2–35.5)
MCHC RBC AUTO-ENTMCNC: 32.7 G/DL (ref 32.2–35.5)
MCV RBC AUTO: 114.2 FL (ref 81.4–97.8)
MCV RBC AUTO: 95 FL (ref 81.4–97.8)
MCV RBC AUTO: 97.7 FL (ref 81.4–97.8)
METAMYELOCYTES NFR BLD MANUAL: 5 %
MONOCYTES # BLD AUTO: 0.65 K/UL (ref 0–0.85)
MONOCYTES # BLD AUTO: 1.97 K/UL (ref 0–0.85)
MONOCYTES NFR BLD AUTO: 10 % (ref 0–13.4)
MONOCYTES NFR BLD AUTO: 5.8 % (ref 0–13.4)
MORPHOLOGY BLD-IMP: NORMAL
MYELOCYTES NFR BLD MANUAL: 4.2 %
NEUTROPHILS # BLD AUTO: 14.28 K/UL (ref 1.82–7.42)
NEUTROPHILS # BLD AUTO: 9.24 K/UL (ref 1.82–7.42)
NEUTROPHILS NFR BLD: 72.5 % (ref 44–72)
NEUTROPHILS NFR BLD: 82.7 % (ref 44–72)
NRBC # BLD AUTO: 0 K/UL
NRBC # BLD AUTO: 0 K/UL
NRBC BLD-RTO: 0 /100 WBC (ref 0–0.2)
NRBC BLD-RTO: 0 /100 WBC (ref 0–0.2)
PLATELET # BLD AUTO: 265 K/UL (ref 164–446)
PLATELET # BLD AUTO: 270 K/UL (ref 164–446)
PLATELET # BLD AUTO: 426 K/UL (ref 164–446)
PLATELET BLD QL SMEAR: NORMAL
PMV BLD AUTO: 10.5 FL (ref 9–12.9)
PMV BLD AUTO: 11 FL (ref 9–12.9)
PMV BLD AUTO: 9.9 FL (ref 9–12.9)
POIKILOCYTOSIS BLD QL SMEAR: NORMAL
POTASSIUM SERPL-SCNC: 4.2 MMOL/L (ref 3.6–5.5)
POTASSIUM SERPL-SCNC: 4.5 MMOL/L (ref 3.6–5.5)
POTASSIUM SERPL-SCNC: 6.3 MMOL/L (ref 3.6–5.5)
PROT SERPL-MCNC: 7.2 G/DL (ref 6–8.2)
PROT SERPL-MCNC: 7.2 G/DL (ref 6–8.2)
PROTHROMBIN TIME: 11.4 SEC (ref 12–14.6)
PROTHROMBIN TIME: 18.6 SEC (ref 12–14.6)
RBC # BLD AUTO: 2.54 M/UL (ref 4.2–5.4)
RBC # BLD AUTO: 3.47 M/UL (ref 4.2–5.4)
RBC # BLD AUTO: 3.83 M/UL (ref 4.2–5.4)
RBC BLD AUTO: PRESENT
SODIUM SERPL-SCNC: 130 MMOL/L (ref 135–145)
SODIUM SERPL-SCNC: 133 MMOL/L (ref 135–145)
SODIUM SERPL-SCNC: 134 MMOL/L (ref 135–145)
WBC # BLD AUTO: 11.2 K/UL (ref 4.8–10.8)
WBC # BLD AUTO: 12.8 K/UL (ref 4.8–10.8)
WBC # BLD AUTO: 19.7 K/UL (ref 4.8–10.8)
WBC TOXIC VACUOLES BLD QL SMEAR: NORMAL

## 2025-01-01 PROCEDURE — 99285 EMERGENCY DEPT VISIT HI MDM: CPT

## 2025-01-01 PROCEDURE — 85027 COMPLETE CBC AUTOMATED: CPT

## 2025-01-01 PROCEDURE — G0493 RN CARE EA 15 MIN HH/HOSPICE: HCPCS

## 2025-01-01 PROCEDURE — 700105 HCHG RX REV CODE 258: Performed by: EMERGENCY MEDICINE

## 2025-01-01 PROCEDURE — 36415 COLL VENOUS BLD VENIPUNCTURE: CPT

## 2025-01-01 PROCEDURE — G0378 HOSPITAL OBSERVATION PER HR: HCPCS

## 2025-01-01 PROCEDURE — 99223 1ST HOSP IP/OBS HIGH 75: CPT | Performed by: INTERNAL MEDICINE

## 2025-01-01 PROCEDURE — 72156 MRI NECK SPINE W/O & W/DYE: CPT

## 2025-01-01 PROCEDURE — 665005 NO-PAY RAP - HOME HEALTH

## 2025-01-01 PROCEDURE — 85384 FIBRINOGEN ACTIVITY: CPT

## 2025-01-01 PROCEDURE — 76705 ECHO EXAM OF ABDOMEN: CPT

## 2025-01-01 PROCEDURE — 87040 BLOOD CULTURE FOR BACTERIA: CPT

## 2025-01-01 PROCEDURE — 71045 X-RAY EXAM CHEST 1 VIEW: CPT

## 2025-01-01 PROCEDURE — 83605 ASSAY OF LACTIC ACID: CPT

## 2025-01-01 PROCEDURE — 85652 RBC SED RATE AUTOMATED: CPT

## 2025-01-01 PROCEDURE — 700111 HCHG RX REV CODE 636 W/ 250 OVERRIDE (IP): Mod: JZ | Performed by: EMERGENCY MEDICINE

## 2025-01-01 PROCEDURE — 85007 BL SMEAR W/DIFF WBC COUNT: CPT

## 2025-01-01 PROCEDURE — 700102 HCHG RX REV CODE 250 W/ 637 OVERRIDE(OP): Performed by: STUDENT IN AN ORGANIZED HEALTH CARE EDUCATION/TRAINING PROGRAM

## 2025-01-01 PROCEDURE — 700111 HCHG RX REV CODE 636 W/ 250 OVERRIDE (IP): Performed by: STUDENT IN AN ORGANIZED HEALTH CARE EDUCATION/TRAINING PROGRAM

## 2025-01-01 PROCEDURE — 99284 EMERGENCY DEPT VISIT MOD MDM: CPT

## 2025-01-01 PROCEDURE — 700101 HCHG RX REV CODE 250: Performed by: STUDENT IN AN ORGANIZED HEALTH CARE EDUCATION/TRAINING PROGRAM

## 2025-01-01 PROCEDURE — 90935 HEMODIALYSIS ONE EVALUATION: CPT | Performed by: INTERNAL MEDICINE

## 2025-01-01 PROCEDURE — 99406 BEHAV CHNG SMOKING 3-10 MIN: CPT | Performed by: INTERNAL MEDICINE

## 2025-01-01 PROCEDURE — 96374 THER/PROPH/DIAG INJ IV PUSH: CPT

## 2025-01-01 PROCEDURE — 86140 C-REACTIVE PROTEIN: CPT

## 2025-01-01 PROCEDURE — 99233 SBSQ HOSP IP/OBS HIGH 50: CPT | Mod: 25 | Performed by: INTERNAL MEDICINE

## 2025-01-01 PROCEDURE — 80053 COMPREHEN METABOLIC PANEL: CPT

## 2025-01-01 PROCEDURE — 80074 ACUTE HEPATITIS PANEL: CPT

## 2025-01-01 PROCEDURE — 93971 EXTREMITY STUDY: CPT | Mod: 26,LT | Performed by: INTERNAL MEDICINE

## 2025-01-01 PROCEDURE — 86706 HEP B SURFACE ANTIBODY: CPT

## 2025-01-01 PROCEDURE — 85730 THROMBOPLASTIN TIME PARTIAL: CPT

## 2025-01-01 PROCEDURE — 85025 COMPLETE CBC W/AUTO DIFF WBC: CPT

## 2025-01-01 PROCEDURE — 700101 HCHG RX REV CODE 250: Performed by: EMERGENCY MEDICINE

## 2025-01-01 PROCEDURE — 96365 THER/PROPH/DIAG IV INF INIT: CPT

## 2025-01-01 PROCEDURE — 85610 PROTHROMBIN TIME: CPT

## 2025-01-01 PROCEDURE — A9270 NON-COVERED ITEM OR SERVICE: HCPCS | Performed by: STUDENT IN AN ORGANIZED HEALTH CARE EDUCATION/TRAINING PROGRAM

## 2025-01-01 PROCEDURE — 96376 TX/PRO/DX INJ SAME DRUG ADON: CPT

## 2025-01-01 PROCEDURE — 700111 HCHG RX REV CODE 636 W/ 250 OVERRIDE (IP): Performed by: INTERNAL MEDICINE

## 2025-01-01 PROCEDURE — 93005 ELECTROCARDIOGRAM TRACING: CPT | Mod: TC | Performed by: INTERNAL MEDICINE

## 2025-01-01 PROCEDURE — 90935 HEMODIALYSIS ONE EVALUATION: CPT

## 2025-01-01 PROCEDURE — 80048 BASIC METABOLIC PNL TOTAL CA: CPT

## 2025-01-01 PROCEDURE — 82140 ASSAY OF AMMONIA: CPT

## 2025-01-01 PROCEDURE — 770020 HCHG ROOM/CARE - TELE (206)

## 2025-01-01 PROCEDURE — 93971 EXTREMITY STUDY: CPT | Mod: LT

## 2025-01-01 PROCEDURE — 82962 GLUCOSE BLOOD TEST: CPT | Mod: 91

## 2025-01-01 PROCEDURE — 96375 TX/PRO/DX INJ NEW DRUG ADDON: CPT

## 2025-01-01 RX ORDER — HEPARIN SODIUM 1000 [USP'U]/ML
1800 INJECTION, SOLUTION INTRAVENOUS; SUBCUTANEOUS
Status: DISCONTINUED | OUTPATIENT
Start: 2025-01-01 | End: 2025-01-03 | Stop reason: HOSPADM

## 2025-01-01 RX ORDER — DEXTROSE MONOHYDRATE 25 G/50ML
25 INJECTION, SOLUTION INTRAVENOUS
Status: DISCONTINUED | OUTPATIENT
Start: 2025-01-01 | End: 2025-01-03 | Stop reason: HOSPADM

## 2025-01-01 RX ORDER — ONDANSETRON 4 MG/1
8 TABLET, ORALLY DISINTEGRATING ORAL EVERY 8 HOURS PRN
Status: DISCONTINUED | OUTPATIENT
Start: 2025-01-01 | End: 2025-02-12 | Stop reason: HOSPADM

## 2025-01-01 RX ORDER — ATROPINE SULFATE 10 MG/ML
2 SOLUTION/ DROPS OPHTHALMIC EVERY 4 HOURS PRN
Status: DISCONTINUED | OUTPATIENT
Start: 2025-01-01 | End: 2025-02-12 | Stop reason: HOSPADM

## 2025-01-01 RX ORDER — INSULIN LISPRO 100 [IU]/ML
3-14 INJECTION, SOLUTION INTRAVENOUS; SUBCUTANEOUS
Status: DISCONTINUED | OUTPATIENT
Start: 2025-01-01 | End: 2025-01-01

## 2025-01-01 RX ORDER — DEXTROSE MONOHYDRATE 25 G/50ML
25 INJECTION, SOLUTION INTRAVENOUS
Status: DISCONTINUED | OUTPATIENT
Start: 2025-01-01 | End: 2025-01-01

## 2025-01-01 RX ORDER — SODIUM CHLORIDE 9 MG/ML
1000 INJECTION, SOLUTION INTRAVENOUS ONCE
Status: COMPLETED | OUTPATIENT
Start: 2025-01-01 | End: 2025-01-01

## 2025-01-01 RX ORDER — MORPHINE SULFATE 100 MG/5ML
10 SOLUTION ORAL
Status: DISCONTINUED | OUTPATIENT
Start: 2025-01-01 | End: 2025-02-12 | Stop reason: HOSPADM

## 2025-01-01 RX ORDER — LORAZEPAM 2 MG/ML
2 INJECTION INTRAMUSCULAR ONCE
Status: COMPLETED | OUTPATIENT
Start: 2025-01-01 | End: 2025-01-01

## 2025-01-01 RX ORDER — LORAZEPAM 2 MG/ML
1 INJECTION INTRAMUSCULAR
Status: DISCONTINUED | OUTPATIENT
Start: 2025-01-01 | End: 2025-02-12 | Stop reason: HOSPADM

## 2025-01-01 RX ORDER — LORAZEPAM 2 MG/ML
1 CONCENTRATE ORAL
Status: DISCONTINUED | OUTPATIENT
Start: 2025-01-01 | End: 2025-02-12 | Stop reason: HOSPADM

## 2025-01-01 RX ORDER — CALCIUM CHLORIDE 100 MG/ML
1 INJECTION INTRAVENOUS; INTRAVENTRICULAR ONCE
Status: DISCONTINUED | OUTPATIENT
Start: 2025-01-01 | End: 2025-02-12 | Stop reason: HOSPADM

## 2025-01-01 RX ORDER — INSULIN LISPRO 100 [IU]/ML
2-9 INJECTION, SOLUTION INTRAVENOUS; SUBCUTANEOUS
Status: DISCONTINUED | OUTPATIENT
Start: 2025-01-01 | End: 2025-01-03 | Stop reason: HOSPADM

## 2025-01-01 RX ORDER — HEPARIN SODIUM 1000 [USP'U]/ML
500 INJECTION, SOLUTION INTRAVENOUS; SUBCUTANEOUS
Status: DISCONTINUED | OUTPATIENT
Start: 2025-01-01 | End: 2025-01-03 | Stop reason: HOSPADM

## 2025-01-01 RX ORDER — FAMOTIDINE 20 MG/1
20 TABLET, FILM COATED ORAL 2 TIMES DAILY
COMMUNITY

## 2025-01-01 RX ORDER — HEPARIN SODIUM 1000 [USP'U]/ML
1000 INJECTION, SOLUTION INTRAVENOUS; SUBCUTANEOUS
Status: DISCONTINUED | OUTPATIENT
Start: 2025-01-01 | End: 2025-01-01

## 2025-01-01 RX ORDER — MORPHINE SULFATE 100 MG/5ML
20 SOLUTION ORAL
Status: DISCONTINUED | OUTPATIENT
Start: 2025-01-01 | End: 2025-02-12 | Stop reason: HOSPADM

## 2025-01-01 RX ORDER — ATORVASTATIN CALCIUM 40 MG/1
40 TABLET, FILM COATED ORAL EVERY EVENING
Qty: 100 TABLET | Refills: 0 | Status: CANCELLED | OUTPATIENT
Start: 2025-01-01

## 2025-01-01 RX ORDER — OXYCODONE HYDROCHLORIDE 10 MG/1
10 TABLET ORAL EVERY 4 HOURS PRN
COMMUNITY

## 2025-01-01 RX ORDER — GLYCOPYRROLATE 1 MG/1
1 TABLET ORAL 3 TIMES DAILY PRN
Status: DISCONTINUED | OUTPATIENT
Start: 2025-01-01 | End: 2025-02-12 | Stop reason: HOSPADM

## 2025-01-01 RX ORDER — ONDANSETRON 2 MG/ML
8 INJECTION INTRAMUSCULAR; INTRAVENOUS EVERY 8 HOURS PRN
Status: DISCONTINUED | OUTPATIENT
Start: 2025-01-01 | End: 2025-02-12 | Stop reason: HOSPADM

## 2025-01-01 RX ORDER — LEVOTHYROXINE SODIUM 125 MCG
250 TABLET ORAL
Qty: 180 TABLET | Refills: 1 | Status: SHIPPED | OUTPATIENT
Start: 2025-01-01 | End: 2025-02-12

## 2025-01-01 RX ORDER — HEPARIN SODIUM 1000 [USP'U]/ML
1500 INJECTION, SOLUTION INTRAVENOUS; SUBCUTANEOUS
Status: DISCONTINUED | OUTPATIENT
Start: 2025-01-01 | End: 2025-01-03 | Stop reason: HOSPADM

## 2025-01-01 RX ORDER — GLYCOPYRROLATE 0.2 MG/ML
0.2 INJECTION INTRAMUSCULAR; INTRAVENOUS 3 TIMES DAILY PRN
Status: DISCONTINUED | OUTPATIENT
Start: 2025-01-01 | End: 2025-02-12 | Stop reason: HOSPADM

## 2025-01-01 RX ADMIN — CYCLOBENZAPRINE 10 MG: 10 TABLET, FILM COATED ORAL at 17:18

## 2025-01-01 RX ADMIN — INSULIN LISPRO 3 UNITS: 100 INJECTION, SOLUTION INTRAVENOUS; SUBCUTANEOUS at 12:30

## 2025-01-01 RX ADMIN — OMEPRAZOLE 20 MG: 20 CAPSULE, DELAYED RELEASE ORAL at 17:19

## 2025-01-01 RX ADMIN — AMLODIPINE BESYLATE 10 MG: 10 TABLET ORAL at 04:33

## 2025-01-01 RX ADMIN — HEPARIN SODIUM 1500 UNITS: 1000 INJECTION, SOLUTION INTRAVENOUS; SUBCUTANEOUS at 15:16

## 2025-01-01 RX ADMIN — URSODIOL 300 MG: 300 CAPSULE ORAL at 12:37

## 2025-01-01 RX ADMIN — RIFAMPIN 300 MG: 300 CAPSULE ORAL at 04:34

## 2025-01-01 RX ADMIN — DEXTROSE MONOHYDRATE 25 G: 25 INJECTION, SOLUTION INTRAVENOUS at 03:01

## 2025-01-01 RX ADMIN — VENLAFAXINE HYDROCHLORIDE 150 MG: 75 CAPSULE, EXTENDED RELEASE ORAL at 04:33

## 2025-01-01 RX ADMIN — SENNOSIDES AND DOCUSATE SODIUM 1 TABLET: 50; 8.6 TABLET ORAL at 04:34

## 2025-01-01 RX ADMIN — HEPARIN SODIUM 1800 UNITS: 1000 INJECTION, SOLUTION INTRAVENOUS; SUBCUTANEOUS at 18:23

## 2025-01-01 RX ADMIN — OXYCODONE HYDROCHLORIDE 15 MG: 10 TABLET ORAL at 22:41

## 2025-01-01 RX ADMIN — SEVELAMER CARBONATE 800 MG: 800 TABLET, FILM COATED ORAL at 12:37

## 2025-01-01 RX ADMIN — SEVELAMER CARBONATE 800 MG: 800 TABLET, FILM COATED ORAL at 17:19

## 2025-01-01 RX ADMIN — OXYCODONE HYDROCHLORIDE 15 MG: 10 TABLET ORAL at 16:09

## 2025-01-01 RX ADMIN — NICOTINE TRANSDERMAL SYSTEM 21 MG: 21 PATCH, EXTENDED RELEASE TRANSDERMAL at 04:33

## 2025-01-01 RX ADMIN — OXYCODONE HYDROCHLORIDE 15 MG: 10 TABLET ORAL at 07:25

## 2025-01-01 RX ADMIN — LIDOCAINE 1 PATCH: 4 PATCH TOPICAL at 07:29

## 2025-01-01 RX ADMIN — RIFAMPIN 300 MG: 300 CAPSULE ORAL at 17:18

## 2025-01-01 RX ADMIN — HEPARIN SODIUM 5000 UNITS: 5000 INJECTION, SOLUTION INTRAVENOUS; SUBCUTANEOUS at 04:34

## 2025-01-01 RX ADMIN — CYCLOBENZAPRINE 10 MG: 10 TABLET, FILM COATED ORAL at 04:33

## 2025-01-01 RX ADMIN — SEVELAMER CARBONATE 800 MG: 800 TABLET, FILM COATED ORAL at 09:22

## 2025-01-01 RX ADMIN — METOPROLOL SUCCINATE 100 MG: 50 TABLET, EXTENDED RELEASE ORAL at 04:33

## 2025-01-01 RX ADMIN — ATORVASTATIN CALCIUM 40 MG: 40 TABLET, FILM COATED ORAL at 17:19

## 2025-01-01 RX ADMIN — URSODIOL 300 MG: 300 CAPSULE ORAL at 17:18

## 2025-01-01 RX ADMIN — HEPARIN SODIUM 5000 UNITS: 5000 INJECTION, SOLUTION INTRAVENOUS; SUBCUTANEOUS at 20:05

## 2025-01-01 RX ADMIN — URSODIOL 300 MG: 300 CAPSULE ORAL at 04:34

## 2025-01-01 RX ADMIN — TRANEXAMIC ACID 1000 MG: 1 INJECTION, SOLUTION INTRAVENOUS at 21:29

## 2025-01-01 RX ADMIN — OMEPRAZOLE 40 MG: 20 CAPSULE, DELAYED RELEASE ORAL at 04:33

## 2025-01-01 RX ADMIN — MORPHINE SULFATE 4 MG: 4 INJECTION INTRAVENOUS at 07:40

## 2025-01-01 RX ADMIN — LIOTHYRONINE SODIUM 5 MCG: 5 TABLET ORAL at 07:27

## 2025-01-01 RX ADMIN — SODIUM CHLORIDE 1000 ML: 9 INJECTION, SOLUTION INTRAVENOUS at 11:27

## 2025-01-01 RX ADMIN — LORAZEPAM 2 MG: 2 INJECTION INTRAMUSCULAR; INTRAVENOUS at 12:02

## 2025-01-01 RX ADMIN — MORPHINE SULFATE 4 MG: 4 INJECTION INTRAVENOUS at 20:05

## 2025-01-01 RX ADMIN — MORPHINE SULFATE 8 MG: 10 INJECTION INTRAVENOUS at 12:02

## 2025-01-01 RX ADMIN — CYCLOBENZAPRINE 10 MG: 10 TABLET, FILM COATED ORAL at 12:37

## 2025-01-01 RX ADMIN — GABAPENTIN 100 MG: 100 CAPSULE ORAL at 12:37

## 2025-01-01 RX ADMIN — LEVOTHYROXINE SODIUM 250 MCG: 0.12 TABLET ORAL at 04:33

## 2025-01-01 RX ADMIN — LORAZEPAM 2 MG: 2 INJECTION INTRAMUSCULAR; INTRAVENOUS at 13:08

## 2025-01-01 RX ADMIN — HEPARIN SODIUM 500 UNITS: 1000 INJECTION, SOLUTION INTRAVENOUS; SUBCUTANEOUS at 15:16

## 2025-01-01 RX ADMIN — HEPARIN SODIUM 5000 UNITS: 5000 INJECTION, SOLUTION INTRAVENOUS; SUBCUTANEOUS at 14:28

## 2025-01-01 ASSESSMENT — ENCOUNTER SYMPTOMS
DYSPNEA ACTIVITY LEVEL: AFTER AMBULATING 10 - 20 FT
PAIN SEVERITY GOAL: 0/10
LAST BOWEL MOVEMENT: 67243
PAIN LOCATION - PAIN SEVERITY: 8/10
PAIN LOCATION - PAIN QUALITY: ACHE,SHARP
SUBJECTIVE PAIN PROGRESSION: UNCHANGED
MYALGIAS: 1
HIGHEST PAIN SEVERITY IN PAST 24 HOURS: 8/10
BACK PAIN: 1
LOWEST PAIN SEVERITY IN PAST 24 HOURS: 3/10
DEBILITATING PAIN: 1
BOWEL PATTERN NORMAL: 1
DYSPNEA ON EXERTION: 1
VOMITING: PT DENIES
PAIN: 1
FATIGUES EASILY: 1
SHORTNESS OF BREATH: 1
NAUSEA: PT DENIES
STOOL FREQUENCY: DAILY

## 2025-01-01 ASSESSMENT — FIBROSIS 4 INDEX
FIB4 SCORE: 4.87
FIB4 SCORE: 1.68
FIB4 SCORE: 5.25
FIB4 SCORE: 5.25

## 2025-01-01 ASSESSMENT — COGNITIVE AND FUNCTIONAL STATUS - GENERAL
TOILETING: A LITTLE
MOVING FROM LYING ON BACK TO SITTING ON SIDE OF FLAT BED: A LITTLE
SUGGESTED CMS G CODE MODIFIER DAILY ACTIVITY: CJ
MOVING TO AND FROM BED TO CHAIR: A LITTLE
DAILY ACTIVITIY SCORE: 21
HELP NEEDED FOR BATHING: A LITTLE
MOBILITY SCORE: 19
DRESSING REGULAR LOWER BODY CLOTHING: A LITTLE
STANDING UP FROM CHAIR USING ARMS: A LITTLE
TURNING FROM BACK TO SIDE WHILE IN FLAT BAD: A LITTLE
SUGGESTED CMS G CODE MODIFIER MOBILITY: CK
CLIMB 3 TO 5 STEPS WITH RAILING: A LITTLE

## 2025-01-01 ASSESSMENT — PAIN DESCRIPTION - PAIN TYPE
TYPE: ACUTE PAIN
TYPE: ACUTE PAIN;CHRONIC PAIN
TYPE: ACUTE PAIN;CHRONIC PAIN
TYPE: ACUTE PAIN

## 2025-01-01 ASSESSMENT — ACTIVITIES OF DAILY LIVING (ADL): OASIS_M1830: 03

## 2025-01-01 NOTE — PROGRESS NOTES
Hypoglycemia Intervention    Hypoglycemia protocol intervention:  Blood glucose 30 at 0913.  Intervention: 8 oz of fruit juice   Repeat blood glucose 0929 at 49.  Intervention: 8 oz of fruit juice and breakfast  Repeat blood glucose 946 at 60  Intervention: 8 oz juice and finish breakfast   Repeat blood glucose at 1019 at 103  Intervention: None needed  Dr. Kimble notified of the above at 0936.

## 2025-01-01 NOTE — PROGRESS NOTES
Critical lab called for glucose of 36. Manually checked blood sugar result of 24. Pt lethargic but responds to verbal stimuli. Dextrose ivp given. Blood sugar rechecked result 129. Ranulfo LOPEZ notified. Holding morning long acting insulin.

## 2025-01-01 NOTE — PROGRESS NOTES
Bedside report received and patient care assumed. Pt is resting in bed, A&O 3 disoriented to situation and reoriented to how she arrived at hospital, with no complaints of pain, and is on RA. Tele box on. All fall precautions are in place, belongings at bedside table.  Pt was updated on POC, no questions or concerns. Pt educated on use of call light for assistance.

## 2025-01-01 NOTE — PROGRESS NOTES
[facility] Medicine Daily Progress Note    Date of Service  1/1/2025    Chief Complaint  Anu Manuel is a 69 y.o. female admitted 12/31/2024 with   Chief Complaint   Patient presents with    Chest Pain     Pt BIB EMS for back pain that radiates to her chest x 3 days, sharp and shooting. Pt Hx: CKD on dialysis (M/W/F), liver failure, COPD, Cardiac stent x1           Hospital Course  No notes on file    Anu Manuel is a 69 y.o. female who presented 12/31/2024 with back pain.  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   Comes in complaining of back pain for the third time since 12/21.  She has chronic lower back pain but this current back pain is located in the right upper back around her shoulder blade.  She says she was helping her friend hanging up frames a day or so before the pain began.  On previous ED visits she had a CTA thoracoabdominal aorta on 12/22 due to this upper back pain which was not concerning for acute pathology.  She also had a CT T-spine which showed extensive degenerative disc disease which is stable since prior CT scan and no acute abnormalities.     In the ED afebrile, blood pressure significantly elevated with systolic in the 180s on presentation.  Labs showed a potassium of 6.2, glucose at 800.  She says her last dialysis session was on 12/30 though she reported a different date to the EDP saying it was on 12/25.     She says she has been compliant with her medication and taking her insulin and is not sure why it was so elevated.  Upon questioning she says she has been feeling more confused lately and just feels out of it.  Even though insulin given here was lower than  home regimen her blood sugar quickly dropped from 798 to hypoglycemic episodes as low as the 20s.  Patient denies having any shaking, tremors, sweats but she was more lethargic and unarousable during these times.  On 10/18 chair A1c was 10.6.  She was not tolerating much oral intake.    Interval Problem Update  Patient was seen and examined at bedside.  I have personally reviewed and interpreted vitals, labs, and imaging.    1/1.  Afebrile.  Intermittent bradycardia.  Hypertension is improved.  On room air.  Blood sugars quickly dropped from greater than 600 with episodes of hypoglycemia this morning.  Decrease long-acting insulin.  Denies fever, chills.  Reports some lethargy.  States her back pain is improved.  Decreased to moderate dose insulin sliding scale and decreased long-acting insulin.  Plan for dialysis today.  Counseled on smoke cessation  Wbc 12.8  Hgb 11.6  10/18 A1c 10.6    I have discussed this patient's plan of care and discharge plan at IDT rounds today with Case Management, Nursing, Nursing leadership, and other members of the IDT team.    Consultants/Specialty  nephrology    Code Status  DNAR/DNI    Disposition  The patient is not medically cleared for discharge to home or a post-acute facility.  Anticipate discharge to: home with organized home healthcare and close outpatient follow-up    I have placed the appropriate orders for post-discharge needs.    Review of Systems  Review of Systems   Constitutional:  Positive for malaise/fatigue.   Musculoskeletal:  Positive for back pain and myalgias.        Physical Exam  Temp:  [36.1 °C (97 °F)-36.5 °C (97.7 °F)] 36.1 °C (97 °F)  Pulse:  [59-86] 65  Resp:  [12-20] 16  BP: (101-155)/(51-75) 115/65  SpO2:  [94 %-100 %] 96 %    Physical Exam  Vitals and nursing note reviewed.   Constitutional:       Appearance: Normal appearance. She is ill-appearing.   HENT:      Head: Normocephalic and atraumatic.      Right Ear: External ear normal.      Left Ear:  External ear normal.      Nose: Nose normal.      Mouth/Throat:      Mouth: Mucous membranes are moist.      Pharynx: Oropharynx is clear. No oropharyngeal exudate or posterior oropharyngeal erythema.   Eyes:      Extraocular Movements: Extraocular movements intact.      Conjunctiva/sclera: Conjunctivae normal.   Cardiovascular:      Rate and Rhythm: Regular rhythm. Bradycardia present.      Pulses: Normal pulses.      Heart sounds: Normal heart sounds. No murmur heard.  Pulmonary:      Effort: Pulmonary effort is normal. No respiratory distress.      Breath sounds: Normal breath sounds. No stridor. No wheezing or rales.   Abdominal:      General: Abdomen is flat. Bowel sounds are normal. There is no distension.      Palpations: Abdomen is soft. There is no mass.      Tenderness: There is no abdominal tenderness.   Musculoskeletal:      Cervical back: Normal range of motion.   Skin:     General: Skin is warm.      Capillary Refill: Capillary refill takes less than 2 seconds.   Neurological:      General: No focal deficit present.      Mental Status: She is alert and oriented to person, place, and time. Mental status is at baseline.      Cranial Nerves: No cranial nerve deficit.   Psychiatric:         Mood and Affect: Mood normal.         Behavior: Behavior normal.         Fluids  No intake or output data in the 24 hours ending 01/01/25 0726     Laboratory  Recent Labs     12/31/24  0320 01/01/25  0111   WBC 11.7* 12.8*   RBC 3.80* 3.83*   HEMOGLOBIN 11.7* 11.6*   HEMATOCRIT 38.2 36.4*   .5* 95.0   MCH 30.8 30.3   MCHC 30.6* 31.9*   RDW 56.9* 53.1*   PLATELETCT 220 265   MPV 9.7 9.9     Recent Labs     12/31/24  0320 12/31/24  0620 12/31/24  1357 01/01/25  0111   SODIUM 122*  --  132* 133*   POTASSIUM 6.2* 2.7* 4.7 4.5   CHLORIDE 88*  --  96 97   CO2 18*  --  21 22   GLUCOSE 798*  --  103* 36*   BUN 28*  --  30* 31*   CREATININE 3.11*  --  3.47* 3.62*   CALCIUM 7.8*  --  8.2* 8.5                    Imaging  DX-CHEST-PORTABLE (1 VIEW)   Final Result      No acute process.           Assessment/Plan  * Hyperkalemia- (present on admission)  Assessment & Plan  1/1/2025  6.2 on presentation, history of ESRD.  She reported to me that her last dialysis session was 12/30 but told the EDP that her last dialysis was on 12/25  She follows with Dr. Carrera  Needs nephrology consult in the morning    Anemia due to chronic kidney disease, on chronic dialysis (HCC)- (present on admission)  Assessment & Plan  1/1/2025  Hemoglobin at baseline.  CBC ordered continue monitoring, transfuse if less than 8 due to history of CAD    Other cirrhosis of liver (HCC)- (present on admission)  Assessment & Plan  1/1/2025  Per notes from [facility] concern for primary sclerosing cholangitis based on liver biopsy but not based on imaging  Monitor volume status     Acute right-sided thoracic back pain- (present on admission)  Assessment & Plan  1/1/2025  She has chronic low back pain for which she is on narcotics.  Presenting with acute right upper back pain around the scapula  On exam muscles around the scapula are tense and tender to palpation  She says she was helping a friend hang a picture frames prior to pain onset  CTA thoracoabdominal aorta done on 12/22 and his CT T-spine on the same date not concerning for acute abnormality  Musculoskeletal pain due to recent strenuous activity for the patient  Difficult to control due to history of narcotic dependence due to chronic low back pain.  IV morphine added, muscle relaxant added    Hypothyroidism, postsurgical- (present on admission)  Assessment & Plan  1/1/2025  Continue liothyronine and levothyroxine    ESRD needing dialysis (HCC)- (present on admission)  Assessment & Plan  1/1/2025  On HD MWF.  Nephrology consult in the morning  Renally dose all medication, avoid nephrotoxic agents    Chronic low back pain- (present on admission)  Assessment & Plan  1/1/2025  Continue home  narcotics    Cholestatic liver disease- (present on admission)  Assessment & Plan  1/1/2025  Continue ursodiol and rifampin for cholestatic pruritus    Hyponatremia- (present on admission)  Assessment & Plan  1/1/2025  Chronic hyponatremia secondary to cirrhosis usually in the high 120s-low 130s  122 on presentation, she has been this low recently  BMP ordered to continue monitoring    Dyslipidemia- (present on admission)  Assessment & Plan  1/1/2025  Continue statin    Anxiety- (present on admission)  Assessment & Plan  1/1/2025  Continue venlafaxine    Primary hypertension- (present on admission)  Assessment & Plan  1/1/2025  Continue metoprolol, amlodipine    Tobacco abuse- (present on admission)  Assessment & Plan  Spent approx 5 mins on Tobacco cessation education . Discussed options of nicotine patch, medical treatment with wellbutrin and chantix. Discussed the benefits of quitting smoking and risks of continued smoking including cardiovascular disease, cancer and COPD.   Code 87067  -I have ordered nicotine replacement therapy    Type 1 diabetes mellitus with hyperglycemia (HCC)- (present on admission)  Assessment & Plan  1/1/2025   Glucose 800 on presentation, she says she has been taking her insulin.  She also says she has been more confused lately and has been confused with dates on interview with different providers  Not acidosis and not in DKA.  Possible HHS due to confusion though that would be rare in DM1.  Serum osmole's pending  Continue insulin glargine and sliding scale added while in the hospital  Frequent fingerstick checks until improvement in her glucose levels         VTE prophylaxis: Heparin    I have performed a physical exam and reviewed and updated ROS and Plan today (1/1/2025). In review of yesterday's note (12/31/2024), there are no changes except as documented above.    Greater than 50 minutes spent prepping to see patient (e.g. review of tests) obtaining and/or reviewing separately  obtained history. Performing a medically appropriate examination and/ evaluation.  Counseling and educating the patient/family/caregiver.  Ordering medications, tests, or procedures.  Referring and communicating with other health care professionals.  Documenting clinical information in EPIC.  Independently interpreting results and communicating results to patient/family/caregiver.  Care coordination.

## 2025-01-01 NOTE — PROGRESS NOTES
Hypoglycemia Intervention    Hypoglycemia protocol intervention:  Blood glucose 40 at 1729.  Intervention: 25 g IV dextrose per MAR   Repeat blood glucose 101 at 1758.  Intervention: Patient NPO, recheck blood glucose in 1 hour Additional interventions needed: Pt lethagic and unable to safely consume food  Dr. Hoffman notified of the above at 1730 and 1758.

## 2025-01-01 NOTE — CONSULTS
DATE OF SERVICE:  12/31/2024     REQUESTING PHYSICIAN:  Dr. Whitley.     REASON FOR CONSULTATION:  Management of endstage renal disease, assess need   for dialysis.     The patient seen and examined, medical records were reviewed.     HISTORY OF PRESENT ILLNESS:  The patient is a 69-year-old lady with a past   medical history significant for endstage renal disease secondary to diabetic   nephropathy, undergoes hemodialysis on Monday, Wednesday, Friday, presented to   the hospital earlier today with chest pain.  She described the pain as   midsternal and sharp, patient has no cough, no hemoptysis or shortness of   breath.  Her last dialysis was yesterday, patient has been admitted and we   were consulted to manage her kidney disease, assessing the need for urgent   dialysis.  Initially also she was diagnosed with hyperkalemia, but repeat   potassium was 2.7.     PAST MEDICAL HISTORY:  Significant for:  1.  Diabetes mellitus.  2.  Endstage renal disease.     ALLERGIES:  No known drug allergies.     SOCIAL HISTORY:  The patient has a 15 pack per year smoking history.     FAMILY HISTORY:  Positive for hypertension in her mother.     MEDICATIONS:  Reviewed.     REVIEW OF SYSTEMS:  All systems were reviewed; they were negative except   outlined in the history of present illness.     PHYSICAL EXAMINATION:  GENERAL:  The patient appears ill, but no apparent distress.  VITAL SIGNS:  Showed blood pressure was 140/96, heart rate was 84, respiratory   rate was 18.  HEENT:  Normocephalic, atraumatic.  Sclerae are anicteric.  Pupils are   reactive.  Nose normal.  Mucous membranes moist.  NECK:  No lymphadenopathy, no JVD, no thyroid mass.  CHEST:  Normal.  LUNGS:  Clear to auscultation.  HEART:  S1, S2.  ABDOMEN:  Soft, nontender.  No hepatosplenomegaly.  There is no inguinal   lymphadenopathy.  EXTREMITIES:  There is trace lower extremity edema.  SKIN:  No skin rash.  NEUROLOGIC:  The patient is alert and oriented x3.      LABORATORY DATA:  Her recent labs from today were reviewed.     DIAGNOSTIC DATA:  The patient had chest x-ray done earlier today.  I reviewed   the image myself, which showed no pulmonary edema.     ASSESSMENT:  1.  End-stage renal disease.  2.  Hypertension.  3.  Anemia.  4.  Chest pain.  5.  Mild hyperkalemia, which has improved.     PLAN:  1.  There is no acute need for renal replacement therapy.  2.  Plan for dialysis tomorrow.  3.  Erythropoietin with dialysis.  4.  Renal diet.  5.  Renal dose all medications.  6.  Prognosis is guarded.     Plan discussed in detail with Dr. Whitley.        ______________________________  FADI NAJJAR, MD FN/RAUDEL    DD:  12/31/2024 14:32  DT:  12/31/2024 17:02    Job#:  [id number]

## 2025-01-01 NOTE — CARE PLAN
The patient is Watcher - Medium risk of patient condition declining or worsening    Shift Goals  Clinical Goals: monitor BG, monitor labs    Progress made toward(s) clinical / shift goals:    Problem: Acute Care of the Diabetic Patient  Goal: Optimal Outcome for the Diabetic Patient  Outcome: Progressing     Problem: Nutrition Deficit  Goal: Adequate Food and Fluid Intake  Outcome: Progressing       Patient is not progressing towards the following goals:

## 2025-01-01 NOTE — PROGRESS NOTES
Bedside shift report received from ramona bates. Pt lethargic, on pulse ox, audibly snoring. Blood sugar checks, bed alarm set, call bell within reach. Right HD cath.

## 2025-01-01 NOTE — CARE PLAN
The patient is Watcher - Medium risk of patient condition declining or worsening         Progress made toward(s) clinical / shift goals:        Problem: Pain - Standard  Goal: Alleviation of pain or a reduction in pain to the patient’s comfort goal  Outcome: Progressing     Problem: Safety  Goal: Will remain free from injury  Outcome: Progressing  Goal: Will remain free from falls  Outcome: Progressing     Problem: Respiratory:  Goal: Respiratory status will improve  Outcome: Progressing     Problem: Medication  Goal: Compliance with prescribed medication will improve  Outcome: Progressing     Problem: Risk for Aspiration  Goal: Patient's risk for aspiration will be absent or decrease  Outcome: Progressing

## 2025-01-02 ENCOUNTER — APPOINTMENT (OUTPATIENT)
Dept: FAMILY PLANNING/WOMEN'S HEALTH CLINIC | Facility: PHYSICIAN GROUP | Age: 70
End: 2025-01-02
Payer: MEDICARE

## 2025-01-02 DIAGNOSIS — N18.6 ESRD NEEDING DIALYSIS (HCC): ICD-10-CM

## 2025-01-02 DIAGNOSIS — I10 PRIMARY HYPERTENSION: ICD-10-CM

## 2025-01-02 DIAGNOSIS — E89.0 HYPOTHYROIDISM, POSTSURGICAL: ICD-10-CM

## 2025-01-02 DIAGNOSIS — E11.65 TYPE 2 DIABETES MELLITUS WITH HYPERGLYCEMIA, UNSPECIFIED WHETHER LONG TERM INSULIN USE (HCC): ICD-10-CM

## 2025-01-02 DIAGNOSIS — F11.20 UNCOMPLICATED OPIOID DEPENDENCE (HCC): Chronic | ICD-10-CM

## 2025-01-02 DIAGNOSIS — J43.1 PANLOBULAR EMPHYSEMA (HCC): ICD-10-CM

## 2025-01-02 DIAGNOSIS — F33.1 MODERATE EPISODE OF RECURRENT MAJOR DEPRESSIVE DISORDER (HCC): ICD-10-CM

## 2025-01-02 DIAGNOSIS — E10.9 TYPE 1 DIABETES MELLITUS ON INSULIN THERAPY (HCC): ICD-10-CM

## 2025-01-02 DIAGNOSIS — Z91.81 RISK FOR FALLS: ICD-10-CM

## 2025-01-02 DIAGNOSIS — Z98.61 CAD S/P PERCUTANEOUS CORONARY ANGIOPLASTY: ICD-10-CM

## 2025-01-02 DIAGNOSIS — Z79.4 LONG-TERM INSULIN USE (HCC): ICD-10-CM

## 2025-01-02 DIAGNOSIS — K72.90 LIVER FAILURE WITHOUT HEPATIC COMA, UNSPECIFIED CHRONICITY (HCC): ICD-10-CM

## 2025-01-02 DIAGNOSIS — E78.5 DYSLIPIDEMIA: ICD-10-CM

## 2025-01-02 DIAGNOSIS — I25.10 CAD S/P PERCUTANEOUS CORONARY ANGIOPLASTY: ICD-10-CM

## 2025-01-02 DIAGNOSIS — R04.2 HEMOPTYSIS: ICD-10-CM

## 2025-01-02 DIAGNOSIS — Z99.2 ESRD NEEDING DIALYSIS (HCC): ICD-10-CM

## 2025-01-02 DIAGNOSIS — E10.42 DIABETIC POLYNEUROPATHY ASSOCIATED WITH TYPE 1 DIABETES MELLITUS (HCC): ICD-10-CM

## 2025-01-02 LAB
ALBUMIN SERPL BCP-MCNC: 3 G/DL (ref 3.2–4.9)
BUN SERPL-MCNC: 15 MG/DL (ref 8–22)
CALCIUM ALBUM COR SERPL-MCNC: 8.7 MG/DL (ref 8.5–10.5)
CALCIUM SERPL-MCNC: 7.9 MG/DL (ref 8.5–10.5)
CHLORIDE SERPL-SCNC: 98 MMOL/L (ref 96–112)
CO2 SERPL-SCNC: 26 MMOL/L (ref 20–33)
CREAT SERPL-MCNC: 2.6 MG/DL (ref 0.5–1.4)
EKG IMPRESSION: NORMAL
ERYTHROCYTE [DISTWIDTH] IN BLOOD BY AUTOMATED COUNT: 53.7 FL (ref 35.9–50)
GFR SERPLBLD CREATININE-BSD FMLA CKD-EPI: 20 ML/MIN/1.73 M 2
GLUCOSE BLD STRIP.AUTO-MCNC: 100 MG/DL (ref 65–99)
GLUCOSE BLD STRIP.AUTO-MCNC: 122 MG/DL (ref 65–99)
GLUCOSE BLD STRIP.AUTO-MCNC: 138 MG/DL (ref 65–99)
GLUCOSE BLD STRIP.AUTO-MCNC: 157 MG/DL (ref 65–99)
GLUCOSE BLD STRIP.AUTO-MCNC: 44 MG/DL (ref 65–99)
GLUCOSE BLD STRIP.AUTO-MCNC: 85 MG/DL (ref 65–99)
GLUCOSE SERPL-MCNC: 41 MG/DL (ref 65–99)
HCT VFR BLD AUTO: 37.4 % (ref 37–47)
HGB BLD-MCNC: 12.2 G/DL (ref 12–16)
MAGNESIUM SERPL-MCNC: 2 MG/DL (ref 1.5–2.5)
MCH RBC QN AUTO: 31.2 PG (ref 27–33)
MCHC RBC AUTO-ENTMCNC: 32.6 G/DL (ref 32.2–35.5)
MCV RBC AUTO: 95.7 FL (ref 81.4–97.8)
PHOSPHATE SERPL-MCNC: 2.2 MG/DL (ref 2.5–4.5)
PLATELET # BLD AUTO: 216 K/UL (ref 164–446)
PMV BLD AUTO: 9.9 FL (ref 9–12.9)
POTASSIUM SERPL-SCNC: 4.5 MMOL/L (ref 3.6–5.5)
RBC # BLD AUTO: 3.91 M/UL (ref 4.2–5.4)
SODIUM SERPL-SCNC: 135 MMOL/L (ref 135–145)
WBC # BLD AUTO: 10.9 K/UL (ref 4.8–10.8)

## 2025-01-02 PROCEDURE — 99497 ADVNCD CARE PLAN 30 MIN: CPT | Performed by: NURSE PRACTITIONER

## 2025-01-02 PROCEDURE — 770020 HCHG ROOM/CARE - TELE (206)

## 2025-01-02 PROCEDURE — 97535 SELF CARE MNGMENT TRAINING: CPT

## 2025-01-02 PROCEDURE — A9270 NON-COVERED ITEM OR SERVICE: HCPCS | Performed by: STUDENT IN AN ORGANIZED HEALTH CARE EDUCATION/TRAINING PROGRAM

## 2025-01-02 PROCEDURE — 700111 HCHG RX REV CODE 636 W/ 250 OVERRIDE (IP)

## 2025-01-02 PROCEDURE — 700111 HCHG RX REV CODE 636 W/ 250 OVERRIDE (IP): Performed by: STUDENT IN AN ORGANIZED HEALTH CARE EDUCATION/TRAINING PROGRAM

## 2025-01-02 PROCEDURE — 700102 HCHG RX REV CODE 250 W/ 637 OVERRIDE(OP): Performed by: INTERNAL MEDICINE

## 2025-01-02 PROCEDURE — 93010 ELECTROCARDIOGRAM REPORT: CPT | Performed by: STUDENT IN AN ORGANIZED HEALTH CARE EDUCATION/TRAINING PROGRAM

## 2025-01-02 PROCEDURE — 700102 HCHG RX REV CODE 250 W/ 637 OVERRIDE(OP): Performed by: STUDENT IN AN ORGANIZED HEALTH CARE EDUCATION/TRAINING PROGRAM

## 2025-01-02 PROCEDURE — 700101 HCHG RX REV CODE 250: Performed by: INTERNAL MEDICINE

## 2025-01-02 PROCEDURE — 99232 SBSQ HOSP IP/OBS MODERATE 35: CPT | Performed by: INTERNAL MEDICINE

## 2025-01-02 PROCEDURE — 85027 COMPLETE CBC AUTOMATED: CPT

## 2025-01-02 PROCEDURE — 82962 GLUCOSE BLOOD TEST: CPT

## 2025-01-02 PROCEDURE — 97166 OT EVAL MOD COMPLEX 45 MIN: CPT

## 2025-01-02 PROCEDURE — 51798 US URINE CAPACITY MEASURE: CPT

## 2025-01-02 PROCEDURE — 99223 1ST HOSP IP/OBS HIGH 75: CPT | Mod: 25 | Performed by: NURSE PRACTITIONER

## 2025-01-02 PROCEDURE — 36415 COLL VENOUS BLD VENIPUNCTURE: CPT

## 2025-01-02 PROCEDURE — 97163 PT EVAL HIGH COMPLEX 45 MIN: CPT

## 2025-01-02 PROCEDURE — A9270 NON-COVERED ITEM OR SERVICE: HCPCS | Performed by: INTERNAL MEDICINE

## 2025-01-02 PROCEDURE — 83735 ASSAY OF MAGNESIUM: CPT

## 2025-01-02 PROCEDURE — 80069 RENAL FUNCTION PANEL: CPT

## 2025-01-02 PROCEDURE — 700101 HCHG RX REV CODE 250: Performed by: STUDENT IN AN ORGANIZED HEALTH CARE EDUCATION/TRAINING PROGRAM

## 2025-01-02 PROCEDURE — 700111 HCHG RX REV CODE 636 W/ 250 OVERRIDE (IP): Mod: JZ,TB | Performed by: INTERNAL MEDICINE

## 2025-01-02 PROCEDURE — 99233 SBSQ HOSP IP/OBS HIGH 50: CPT | Performed by: INTERNAL MEDICINE

## 2025-01-02 RX ORDER — AMOXICILLIN 250 MG
2 CAPSULE ORAL 2 TIMES DAILY
Status: DISCONTINUED | OUTPATIENT
Start: 2025-01-03 | End: 2025-01-03 | Stop reason: HOSPADM

## 2025-01-02 RX ORDER — POLYETHYLENE GLYCOL 3350 17 G/17G
1 POWDER, FOR SOLUTION ORAL DAILY
Status: DISCONTINUED | OUTPATIENT
Start: 2025-01-03 | End: 2025-01-03 | Stop reason: HOSPADM

## 2025-01-02 RX ORDER — MORPHINE SULFATE 4 MG/ML
2 INJECTION INTRAVENOUS ONCE
Status: COMPLETED | OUTPATIENT
Start: 2025-01-02 | End: 2025-01-02

## 2025-01-02 RX ORDER — AMOXICILLIN 250 MG
2 CAPSULE ORAL EVERY EVENING
Status: DISCONTINUED | OUTPATIENT
Start: 2025-01-02 | End: 2025-01-02

## 2025-01-02 RX ORDER — HYDROMORPHONE HYDROCHLORIDE 1 MG/ML
1 INJECTION, SOLUTION INTRAMUSCULAR; INTRAVENOUS; SUBCUTANEOUS EVERY 4 HOURS PRN
Status: DISCONTINUED | OUTPATIENT
Start: 2025-01-02 | End: 2025-01-03 | Stop reason: HOSPADM

## 2025-01-02 RX ORDER — POLYETHYLENE GLYCOL 3350 17 G/17G
1 POWDER, FOR SOLUTION ORAL
Status: DISCONTINUED | OUTPATIENT
Start: 2025-01-02 | End: 2025-01-02

## 2025-01-02 RX ADMIN — DIBASIC SODIUM PHOSPHATE, MONOBASIC POTASSIUM PHOSPHATE AND MONOBASIC SODIUM PHOSPHATE 250 MG: 852; 155; 130 TABLET ORAL at 06:30

## 2025-01-02 RX ADMIN — AMLODIPINE BESYLATE 10 MG: 10 TABLET ORAL at 04:42

## 2025-01-02 RX ADMIN — SEVELAMER CARBONATE 800 MG: 800 TABLET, FILM COATED ORAL at 12:24

## 2025-01-02 RX ADMIN — HEPARIN SODIUM 5000 UNITS: 5000 INJECTION, SOLUTION INTRAVENOUS; SUBCUTANEOUS at 04:43

## 2025-01-02 RX ADMIN — MAGNESIUM HYDROXIDE 30 ML: 1200 LIQUID ORAL at 23:01

## 2025-01-02 RX ADMIN — OXYCODONE HYDROCHLORIDE 15 MG: 10 TABLET ORAL at 02:37

## 2025-01-02 RX ADMIN — LEVOTHYROXINE SODIUM 250 MCG: 0.12 TABLET ORAL at 04:42

## 2025-01-02 RX ADMIN — HYDROMORPHONE HYDROCHLORIDE 1 MG: 1 INJECTION, SOLUTION INTRAMUSCULAR; INTRAVENOUS; SUBCUTANEOUS at 21:42

## 2025-01-02 RX ADMIN — URSODIOL 300 MG: 300 CAPSULE ORAL at 12:24

## 2025-01-02 RX ADMIN — SENNOSIDES AND DOCUSATE SODIUM 1 TABLET: 50; 8.6 TABLET ORAL at 04:42

## 2025-01-02 RX ADMIN — ATORVASTATIN CALCIUM 40 MG: 40 TABLET, FILM COATED ORAL at 17:14

## 2025-01-02 RX ADMIN — RIFAMPIN 300 MG: 300 CAPSULE ORAL at 17:14

## 2025-01-02 RX ADMIN — MORPHINE SULFATE 4 MG: 4 INJECTION INTRAVENOUS at 09:45

## 2025-01-02 RX ADMIN — MORPHINE SULFATE 2 MG: 4 INJECTION INTRAVENOUS at 04:39

## 2025-01-02 RX ADMIN — SENNOSIDES AND DOCUSATE SODIUM 2 TABLET: 50; 8.6 TABLET ORAL at 17:14

## 2025-01-02 RX ADMIN — SEVELAMER CARBONATE 800 MG: 800 TABLET, FILM COATED ORAL at 07:44

## 2025-01-02 RX ADMIN — MORPHINE SULFATE 4 MG: 4 INJECTION INTRAVENOUS at 02:06

## 2025-01-02 RX ADMIN — SEVELAMER CARBONATE 800 MG: 800 TABLET, FILM COATED ORAL at 17:14

## 2025-01-02 RX ADMIN — OXYCODONE HYDROCHLORIDE 15 MG: 10 TABLET ORAL at 13:26

## 2025-01-02 RX ADMIN — DEXTROSE MONOHYDRATE 25 G: 25 INJECTION, SOLUTION INTRAVENOUS at 04:57

## 2025-01-02 RX ADMIN — HEPARIN SODIUM 5000 UNITS: 5000 INJECTION, SOLUTION INTRAVENOUS; SUBCUTANEOUS at 13:23

## 2025-01-02 RX ADMIN — NICOTINE TRANSDERMAL SYSTEM 21 MG: 21 PATCH, EXTENDED RELEASE TRANSDERMAL at 04:41

## 2025-01-02 RX ADMIN — RIFAMPIN 300 MG: 300 CAPSULE ORAL at 04:42

## 2025-01-02 RX ADMIN — LIDOCAINE 1 PATCH: 4 PATCH TOPICAL at 09:42

## 2025-01-02 RX ADMIN — OXYCODONE HYDROCHLORIDE 15 MG: 10 TABLET ORAL at 18:12

## 2025-01-02 RX ADMIN — VENLAFAXINE HYDROCHLORIDE 150 MG: 75 CAPSULE, EXTENDED RELEASE ORAL at 04:41

## 2025-01-02 RX ADMIN — CYCLOBENZAPRINE 10 MG: 10 TABLET, FILM COATED ORAL at 12:24

## 2025-01-02 RX ADMIN — HEPARIN SODIUM 5000 UNITS: 5000 INJECTION, SOLUTION INTRAVENOUS; SUBCUTANEOUS at 22:11

## 2025-01-02 RX ADMIN — OXYCODONE HYDROCHLORIDE 15 MG: 10 TABLET ORAL at 06:30

## 2025-01-02 RX ADMIN — CYCLOBENZAPRINE 10 MG: 10 TABLET, FILM COATED ORAL at 17:17

## 2025-01-02 RX ADMIN — URSODIOL 300 MG: 300 CAPSULE ORAL at 04:43

## 2025-01-02 RX ADMIN — URSODIOL 300 MG: 300 CAPSULE ORAL at 17:14

## 2025-01-02 RX ADMIN — CYCLOBENZAPRINE 10 MG: 10 TABLET, FILM COATED ORAL at 04:42

## 2025-01-02 RX ADMIN — OMEPRAZOLE 40 MG: 20 CAPSULE, DELAYED RELEASE ORAL at 04:42

## 2025-01-02 RX ADMIN — OMEPRAZOLE 20 MG: 20 CAPSULE, DELAYED RELEASE ORAL at 17:14

## 2025-01-02 RX ADMIN — LIOTHYRONINE SODIUM 5 MCG: 5 TABLET ORAL at 04:41

## 2025-01-02 RX ADMIN — METOPROLOL SUCCINATE 100 MG: 50 TABLET, EXTENDED RELEASE ORAL at 04:42

## 2025-01-02 ASSESSMENT — COGNITIVE AND FUNCTIONAL STATUS - GENERAL
WALKING IN HOSPITAL ROOM: A LITTLE
CLIMB 3 TO 5 STEPS WITH RAILING: A LITTLE
SUGGESTED CMS G CODE MODIFIER DAILY ACTIVITY: CJ
SUGGESTED CMS G CODE MODIFIER MOBILITY: CJ
MOBILITY SCORE: 20
MOVING TO AND FROM BED TO CHAIR: A LITTLE
TOILETING: A LITTLE
HELP NEEDED FOR BATHING: A LITTLE
STANDING UP FROM CHAIR USING ARMS: A LITTLE
DAILY ACTIVITIY SCORE: 20
DRESSING REGULAR UPPER BODY CLOTHING: A LITTLE
DRESSING REGULAR LOWER BODY CLOTHING: A LITTLE

## 2025-01-02 ASSESSMENT — PAIN DESCRIPTION - PAIN TYPE
TYPE: ACUTE PAIN

## 2025-01-02 ASSESSMENT — GAIT ASSESSMENTS
ASSISTIVE DEVICE: FRONT WHEEL WALKER
DEVIATION: BRADYKINETIC;SHUFFLED GAIT
GAIT LEVEL OF ASSIST: CONTACT GUARD ASSIST
DISTANCE (FEET): 20

## 2025-01-02 ASSESSMENT — ENCOUNTER SYMPTOMS
CHILLS: 0
COUGH: 0
FEVER: 0
VOMITING: 0
SHORTNESS OF BREATH: 0
NECK PAIN: 1
MYALGIAS: 1
FALLS: 0
NAUSEA: 0
BACK PAIN: 1

## 2025-01-02 ASSESSMENT — ACTIVITIES OF DAILY LIVING (ADL): TOILETING: INDEPENDENT

## 2025-01-02 ASSESSMENT — FIBROSIS 4 INDEX: FIB4 SCORE: 1.68

## 2025-01-02 NOTE — THERAPY
Occupational Therapy   Initial Evaluation     Patient Name: Anu Manuel  Age:  69 y.o., Sex:  female  Medical Record #: [id number]  Today's Date: 1/2/2025     Precautions  Precautions: Fall Risk  Comments: Labile glucose    Assessment  Patient is a 69 y.o. female with a diagnosis of hyperkalemia after presenting on 12/31 with back pain radiating her chest. Pt's glucose levels have been labile dropping from 798 to as low as the 20s. 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      During OT eval, pt presented with impaired strength, activity tolerance, balance, mobility and pain affecting her ADL function. Pt needed CGA for standing, mobility to the bathroom and transfers with no AD; may benefit from use of an AD; handed pt off to PT end of session. Pt could don socks and underwear and perform toileting clothing mgmt and hygiene with CGA. Pt maintained static standing balance at the sink for g/h with close SBA. Pt had one LOB upon initially standing needing Kerwin to correct. If pt can stay with her daughter for increased support, recommend home health. Otherwise would recommend post-acute placement given pt lives alone and currently needs increased assist for safety.     Plan    Occupational Therapy Initial Treatment Plan   Treatment Interventions: Self Care / Activities of Daily Living, Adaptive Equipment, Neuro Re-Education / Balance, Therapeutic Exercises, Therapeutic Activity  Treatment Frequency: 3 Times per Week  Duration: Until Therapy Goals Met    DC Equipment Recommendations: Unable to determine at this time  Discharge Recommendations:  Recommend home health for continued occupational therapy services (IF pt can stay with her daughter for increased support upon discharge, otherwise pt may need placement)     Subjective    Pt agreeable to OT eval.      Objective     01/02/25 1035   Initial Contact Note    Initial Contact Note Order Received and Verified, Occupational Therapy Evaluation in Progress with Full Report to Follow.   Prior Living Situation   Prior Services Home-Independent   Housing / Facility 1 Story House   Steps Into Home 2   Steps In Home 0   Bathroom Set up Walk In Shower;Bathtub / Shower Combination;Shower Chair  (uses the tub shower)   Equipment Owned Front-Wheel Walker;4-Wheel Walker;Single Point Cane;Tub / Shower Seat;Hand Held Shower   Lives with - Patient's Self Care Capacity Alone and Able to Care For Self   Comments Pt reports her brother lives nearby and her daughter also lives locally. Reports she could stay with her daughter and her daughter via telephone agrees pt could stay with her temporarily if needed   Prior Level of ADL Function   Self Feeding Independent   Grooming / Hygiene Independent   Bathing Independent   Dressing Independent   Toileting Independent   Prior Level of IADL Function   Medication Management Requires Assist  (reports she manages her meds on her own but does not think she always does so correctly)   Laundry Unable To Determine At This Time   Kitchen Mobility Unable To Determine At This Time   Finances Unable To Determine At This Time   Home Management Unable To Determine At This Time   Shopping Unable To Determine At This Time   Prior Level Of Mobility Independent Without Device in Home   Driving / Transportation Driving Independent   Precautions   Precautions Fall Risk   Comments Labile glucose   Pain   Intervention Repositioned;Rest;Nurse Notified   Pain 0 - 10 Group   Location Back   Location Orientation Mid   Pain Rating Scale (NPRS)   (did not quantify)   Description Aching   Non Verbal  Descriptors   Non Verbal Scale  Calm   Cognition    Cognition / Consciousness WDL   Comments Pleasant and cooperative   Active ROM Upper Body   Active ROM Upper Body  WDL   Strength Upper Body   Upper Body Strength  WDL   Sensation Upper Body   Upper Extremity Sensation  Not Tested   Upper Body Muscle Tone   Upper Body Muscle Tone  WDL   Neurological Concerns   Neurological Concerns No   Coordination Upper Body   Coordination WDL   Balance Assessment   Sitting Balance (Static) Fair +   Sitting Balance (Dynamic) Fair +   Standing Balance (Static) Fair -   Standing Balance (Dynamic) Poor +   Weight Shift Sitting Fair   Weight Shift Standing Fair   Comments no AD then FWW, one LOB upon initially standing needing Kerwin to correct   Bed Mobility    Supine to Sit Supervised   Scooting Supervised   Comments HOB elevated   ADL Assessment   Grooming Standby Assist;Standing  (hand hygiene, oral care at sink)   Lower Body Dressing Contact Guard Assist  (don underwear)   Toileting Contact Guard Assist   How much help from another person does the patient currently need...   Putting on and taking off regular lower body clothing? 3   Bathing (including washing, rinsing, and drying)? 3   Toileting, which includes using a toilet, bedpan, or urinal? 3   Putting on and taking off regular upper body clothing? 3   Taking care of personal grooming such as brushing teeth? 4   Eating meals? 4   6 Clicks Daily Activity Score 20   Functional Mobility   Sit to Stand Contact Guard Assist   Bed, Chair, Wheelchair Transfer Contact Guard Assist   Toilet Transfers Contact Guard Assist   Transfer Method Stand Step   Mobility EOB>toilet>sink, handoff to PT. CGA with no AD   Activity Tolerance   Standing 3 min   Short Term Goals   Short Term Goal # 1 Pt will perform ADL transfers with SPV   Short Term Goal # 2 Pt will perform standing g/h with SPV   Short Term Goal # 3 Pt will perform LBD including underwear/pants with SPV and no LOB   Education Group    Role of Occupational Therapist Patient Response Patient;Family;Acceptance;Explanation;Verbal Demonstration  (pt's daughter present via speaker phone throughout)   Occupational Therapy Initial Treatment Plan    Treatment Interventions Self Care / Activities of Daily Living;Adaptive Equipment;Neuro Re-Education / Balance;Therapeutic Exercises;Therapeutic Activity   Treatment Frequency 3 Times per Week   Duration Until Therapy Goals Met   Problem List   Problem List Decreased Active Daily Living Skills;Decreased Homemaking Skills;Decreased Functional Mobility;Decreased Activity Tolerance;Impaired Postural Control / Balance   Anticipated Discharge Equipment and Recommendations   DC Equipment Recommendations Unable to determine at this time   Discharge Recommendations Recommend home health for continued occupational therapy services  (IF pt can stay with her daughter for increased support upon discharge, otherwise pt may need placement)   Interdisciplinary Plan of Care Collaboration   IDT Collaboration with  Nursing;Physical Therapist;Family / Caregiver   Patient Position at End of Therapy Seated;Other (Comments)   Collaboration Comments RN updated, handoff to PT, pt's daughter present via speakerphone and reported pt could temporarily stay with her   Session Information   Date / Session Number  1/2 #1 (1/2, 1/8)

## 2025-01-02 NOTE — PROGRESS NOTES
Called placed to daughter, Farzaneh. Patient said we can discuss all information with her. Explained POC to daughter.

## 2025-01-02 NOTE — THERAPY
Physical Therapy   Initial Evaluation     Patient Name: Anu Manuel  Age:  69 y.o., Sex:  female  Medical Record #: [id number]  Today's Date: 1/2/2025     Precautions  Precautions: Fall Risk  Comments: labile glucose    Assessment  Patient is 69 y.o. female admitted with chest pain for 3 days, back pain, with hyperglycemia in 700's with fluctuation of episodes of hypoglycemia down to 20's, hyperkalemia, hypotension. PMHX of CKD on HD, liver failure, cirrhosis, COPD, prior cardiac stent, tobacco use, DM I. Pt required CGA for ambulation with FWW, did not tolerate sitting up in chair 2/2 pain. Further details listed below. Recommend home with HH, would benefit from staying at her daughter's house to have some support. Pt would benefit from continued acute IP PT services to address said deficits.     Plan    Physical Therapy Initial Treatment Plan   Treatment Plan : Bed Mobility, Equipment, Family / Caregiver Training, Gait Training, Manual Therapy, Neuro Re-Education / Balance, Self Care / Home Evaluation, Stair Training, Therapeutic Activities, Therapeutic Exercise  Treatment Frequency: 3 Times per Week  Duration: Until Therapy Goals Met    DC Equipment Recommendations: None (owns walkers and canes)  Discharge Recommendations: Recommend home health for continued physical therapy services       Subjective    Pt's daughter on phone during session, reporting pt can come stay with her     Objective     01/02/25 1053   Vitals   O2 Delivery Device None - Room Air   Pain 0 - 10 Group   Therapist Pain Assessment Post Activity Pain Same as Prior to Activity;Nurse Notified  (pain not rated, agreeable to mobility)   Prior Living Situation   Prior Services Home-Independent   Housing / Facility 1 Story House   Steps Into Home 2   Steps In Home 0   Bathroom Set up Walk In Shower;Bathtub / Shower Combination;Shower Chair   Equipment Owned Front-Wheel Walker;4-Wheel Walker;Single Point Cane   Lives with - Patient's Self  Care Capacity Alone and Able to Care For Self   Comments Reports brother and daughter local; can stay with daughter at  1SH with 1 VIC   Prior Level of Functional Mobility   Bed Mobility Independent   Transfer Status Independent   Ambulation Independent   Ambulation Distance   (community)   Assistive Devices Used None   Stairs Independent   Cognition    Cognition / Consciousness X   Level of Consciousness Alert   Safety Awareness Impaired   New Learning Impaired   Attention Impaired   Comments Pleasant and cooperative   Strength Upper Body   Upper Body Strength  WDL   Active ROM Lower Body    Active ROM Lower Body  WDL   Strength Lower Body   Comments BLE grossly 3+ to 4/5   Balance Assessment   Sitting Balance (Static) Fair +   Sitting Balance (Dynamic) Fair +   Standing Balance (Static) Fair -   Standing Balance (Dynamic) Poor +   Weight Shift Sitting Fair   Weight Shift Standing Fair   Comments w/ FWW   Bed Mobility    Sit to Supine Supervised   Scooting Supervised   Rolling Supervised   Gait Analysis   Gait Level Of Assist Contact Guard Assist   Assistive Device Front Wheel Walker   Distance (Feet) 20   # of Times Distance was Traveled 1   Deviation Bradykinetic;Shuffled Gait   Comments cues for safety with FWW   Functional Mobility   Sit to Stand Contact Guard Assist   Bed, Chair, Wheelchair Transfer Contact Guard Assist  (bathroom to chair then chair to bed)   Transfer Method Stand Step   Mobility bathroom to chair, to bed   Comments did not tolerate sitting in chair 2/2 L shoulder/back pain   6 Clicks Assessment - How much HELP from from another person do you currently need... (If the patient hasn't done an activity recently, how much help from another person do you think he/she would need if he/she tried?)   Turning from your back to your side while in a flat bed without using bedrails? 4   Moving from lying on your back to sitting on the side of a flat bed without using bedrails? 4   Moving to and from a  bed to a chair (including a wheelchair)? 3   Standing up from a chair using your arms (e.g., wheelchair, or bedside chair)? 3   Walking in hospital room? 3   Climbing 3-5 steps with a railing? 3   6 clicks Mobility Score 20   Activity Tolerance   Sitting in Chair 2 min   Sitting Edge of Bed 1 min   Standing 3 min   Comments limited 2/2 pain   Patient / Family Goals    Patient / Family Goal #1 to return home   Short Term Goals    Short Term Goal # 1 Pt will perform stand step transfers with FWW and SPV in 6 visits to get in/out of chair   Short Term Goal # 2 Pt will ambulate 150ft with FWW and SPV in 6 visits to access home environment   Short Term Goal # 3 Pt will ascend/descend 1 steps with unilateral UE support and SPV in 6 visits to safely enter/exit home   Education Group   Education Provided Role of Physical Therapist;Gait Training;Use of Assistive Device   Role of Physical Therapist Patient Response Patient;Acceptance;Explanation;Verbal Demonstration   Gait Training Patient Response Patient;Acceptance;Explanation;Verbal Demonstration;Action Demonstration   Use of Assistive Device Patient Response Patient;Acceptance;Explanation;Action Demonstration;Reinforcement Needed   Additional Comments Pt receptive to self management and compensatory strategies with mobility   Physical Therapy Initial Treatment Plan    Treatment Plan  Bed Mobility;Equipment;Family / Caregiver Training;Gait Training;Manual Therapy;Neuro Re-Education / Balance;Self Care / Home Evaluation;Stair Training;Therapeutic Activities;Therapeutic Exercise   Treatment Frequency 3 Times per Week   Duration Until Therapy Goals Met   Problem List    Problems Pain;Impaired Transfers;Impaired Ambulation;Functional Strength Deficit;Impaired Balance;Decreased Activity Tolerance;Safety Awareness Deficits / Cognition   Anticipated Discharge Equipment and Recommendations   DC Equipment Recommendations None  (owns walkers and canes)   Discharge Recommendations  Recommend home health for continued physical therapy services   Interdisciplinary Plan of Care Collaboration   IDT Collaboration with  Nursing;Occupational Therapist;Family / Caregiver  (handoff from OT in bathroom; daughter on phone for session)   Patient Position at End of Therapy In Bed;Bed Alarm On;Call Light within Reach;Tray Table within Reach;Phone within Reach   Collaboration Comments RN updated   Session Information   Date / Session Number  1/2- 1 (1/3, 1/8)

## 2025-01-02 NOTE — CARE PLAN
The patient is Watcher - Medium risk of patient condition declining or worsening    Shift Goals  Clinical Goals: monitor BG, monitor labs  Patient Goals: rest, pain control  Family Goals: elissa    Progress made toward(s) clinical / shift goals:    Problem: Knowledge Deficit - Standard  Goal: Patient and family/care givers will demonstrate understanding of plan of care, disease process/condition, diagnostic tests and medications  Outcome: Progressing     Problem: Fall Risk  Goal: Patient will remain free from falls  Outcome: Progressing     Problem: Acute Care of the Diabetic Patient  Goal: Optimal Outcome for the Diabetic Patient  Outcome: Progressing     Problem: Nutrition Deficit  Goal: Adequate Food and Fluid Intake  Outcome: Progressing       Patient is not progressing towards the following goals:

## 2025-01-02 NOTE — PROGRESS NOTES
Nephrology Daily Progress Note    Date of Service  1/2/2025    Chief Complaint  69 y.o. female admitted 12/31/2024 with chest pain    Interval Problem Update  Patient is doing better today  No chest pain or shortness of breath  Awaiting placement    Review of Systems  Review of Systems   Constitutional:  Negative for chills, fever and malaise/fatigue.   Respiratory:  Negative for cough and shortness of breath.    Cardiovascular:  Negative for chest pain and leg swelling.   Gastrointestinal:  Negative for nausea and vomiting.   Genitourinary:  Negative for dysuria, frequency and urgency.        Physical Exam  Temp:  [36.1 °C (97 °F)-36.5 °C (97.7 °F)] 36.5 °C (97.7 °F)  Pulse:  [59-75] 69  Resp:  [16-20] 16  BP: (108-137)/(56-83) 108/56  SpO2:  [94 %-98 %] 98 %    Physical Exam  Vitals and nursing note reviewed.   Constitutional:       General: She is not in acute distress.     Appearance: Normal appearance. She is well-developed. She is not diaphoretic.   HENT:      Head: Normocephalic and atraumatic.      Right Ear: External ear normal.      Left Ear: External ear normal.      Nose: Nose normal.   Eyes:      General: No scleral icterus.        Right eye: No discharge.         Left eye: No discharge.      Conjunctiva/sclera: Conjunctivae normal.   Cardiovascular:      Rate and Rhythm: Normal rate and regular rhythm.      Heart sounds: No murmur heard.  Pulmonary:      Effort: Pulmonary effort is normal. No respiratory distress.      Breath sounds: Normal breath sounds.   Musculoskeletal:         General: No tenderness.      Right lower leg: No edema.      Left lower leg: No edema.   Skin:     General: Skin is warm and dry.      Findings: No erythema.   Neurological:      General: No focal deficit present.      Mental Status: She is alert and oriented to person, place, and time.      Cranial Nerves: No cranial nerve deficit.   Psychiatric:         Mood and Affect: Mood normal.         Behavior: Behavior normal.          Fluids    Intake/Output Summary (Last 24 hours) at 1/2/2025 1327  Last data filed at 1/2/2025 0937  Gross per 24 hour   Intake 1140 ml   Output 2250 ml   Net -1110 ml       Laboratory  Recent Labs     12/31/24  0320 01/01/25  0111 01/02/25  0405   WBC 11.7* 12.8* 10.9*   RBC 3.80* 3.83* 3.91*   HEMOGLOBIN 11.7* 11.6* 12.2   HEMATOCRIT 38.2 36.4* 37.4   .5* 95.0 95.7   MCH 30.8 30.3 31.2   MCHC 30.6* 31.9* 32.6   RDW 56.9* 53.1* 53.7*   PLATELETCT 220 265 216   MPV 9.7 9.9 9.9     Recent Labs     12/31/24  1357 01/01/25  0111 01/02/25  0405   SODIUM 132* 133* 135   POTASSIUM 4.7 4.5 4.5   CHLORIDE 96 97 98   CO2 21 22 26   GLUCOSE 103* 36* 41*   BUN 30* 31* 15   CREATININE 3.47* 3.62* 2.60*   CALCIUM 8.2* 8.5 7.9*         Recent Labs     12/31/24  0320   NTPROBNP 3453*           Imaging  DX-CHEST-PORTABLE (1 VIEW)   Final Result      No acute process.            Assessment/Plan  1 ESRD  2 chest pain  3 respiratory failure  no acute need for HD today  Renal diet  Daily BMP, CBC.  Renal dose all meds  Avoid nephrotoxins like NSAIDs.  Okay to discharge from [facility] point

## 2025-01-02 NOTE — PROGRESS NOTES
[facility] Medicine Daily Progress Note    Date of Service  1/2/2025    Chief Complaint  Anu Manuel is a 69 y.o. female admitted 12/31/2024 with   Chief Complaint   Patient presents with    Chest Pain     Pt BIB EMS for back pain that radiates to her chest x 3 days, sharp and shooting. Pt Hx: CKD on dialysis (M/W/F), liver failure, COPD, Cardiac stent x1           Hospital Course  No notes on file    Anu Manuel is a 69 y.o. female who presented 12/31/2024 with back pain.  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   Comes in complaining of back pain for the third time since 12/21.  She has chronic lower back pain but this current back pain is located in the right upper back around her shoulder blade.  She says she was helping her friend hanging up frames a day or so before the pain began.  On previous ED visits she had a CTA thoracoabdominal aorta on 12/22 due to this upper back pain which was not concerning for acute pathology.  She also had a CT T-spine which showed extensive degenerative disc disease which is stable since prior CT scan and no acute abnormalities.     In the ED afebrile, blood pressure significantly elevated with systolic in the 180s on presentation.  Labs showed a potassium of 6.2, glucose at 800.  She says her last dialysis session was on 12/30 though she reported a different date to the EDP saying it was on 12/25.     She says she has been compliant with her medication and taking her insulin and is not sure why it was so elevated.  Upon questioning she says she has been feeling more confused lately and just feels out of it.  Even though insulin given here was lower than  home regimen her blood sugar quickly dropped from 798 to hypoglycemic episodes as low as the 20s.  Patient denies having any shaking, tremors, sweats but she was more lethargic and unarousable during these times.  On 10/18 her A1c was 10.6.  She was not tolerating much oral intake.    Interval Problem Update  Patient was seen and examined at bedside.  I have personally reviewed and interpreted vitals, labs, and imaging.    1/1.  Afebrile.  Intermittent bradycardia.  Hypertension is improved.  On room air.  Blood sugars quickly dropped from greater than 600 with episodes of hypoglycemia this morning.  Decrease long-acting insulin.  Denies fever, chills.  Reports some lethargy.  States her back pain is improved.  Decreased to moderate dose insulin sliding scale and decreased long-acting insulin.  Plan for dialysis today.  Counseled on smoke cessation  10/18 A1c 10.6  1/2.  Afebrile.  Intermittent bradycardia.  On room air.  Hypophos 2/2 phos binder.  Glucose 41 on BMP.  Patient is more awake.  Denies fevers, chills, chest pains, shortness of breath.  Complains of back pain and left shoulder pain.  Is hard to get a good history from her and how it is changed in the multiple times she has come into the ER over the past 2 weeks.  She is more awake.  Fingersticks are improved today.  States at home she has not been consistently eating or taking her insulin but she has not previously had swings in blood sugar like this.  Discontinue long-acting insulin and continue only sliding scale.  PT/OT recommending home health.  Discussed with palliative care.  Apparently patient was previously on palliative care mostly for pain medicine.  This is unclear because her PDMP shows no controlled substances prescribed in [place] and patient states they do not get them anywhere else.  Discussed with patient and daughter.  Would like to continue dialysis.  Ongoing goals of care discussion.  Nephrology following.  Wbc 12.8 > 10.9  Hgb 11.6 >  12.2    I have discussed this patient's plan of care and discharge plan at IDT rounds today with Case Management, Nursing, Nursing leadership, and other members of the IDT team.    Consultants/Specialty  nephrology    Code Status  DNAR/DNI    Disposition  The patient is not medically cleared for discharge to home or a post-acute facility.  Anticipate discharge to: home with organized home healthcare and close outpatient follow-up    I have placed the appropriate orders for post-discharge needs.    Review of Systems  Review of Systems   Constitutional:  Positive for malaise/fatigue.   Musculoskeletal:  Positive for back pain and myalgias.        Physical Exam  Temp:  [36.1 °C (97 °F)-36.5 °C (97.7 °F)] 36.5 °C (97.7 °F)  Pulse:  [58-75] 64  Resp:  [16-18] 18  BP: (108-137)/(56-83) 126/71  SpO2:  [94 %-98 %] 95 %    Physical Exam  Vitals and nursing note reviewed.   Constitutional:       Appearance: Normal appearance. She is ill-appearing.   HENT:      Head: Normocephalic and atraumatic.      Right Ear: External ear normal.      Left Ear: External ear normal.      Nose: Nose normal.      Mouth/Throat:      Mouth: Mucous membranes are moist.      Pharynx: Oropharynx is clear. No oropharyngeal exudate or posterior oropharyngeal erythema.   Eyes:      Extraocular Movements: Extraocular movements intact.      Conjunctiva/sclera: Conjunctivae normal.   Cardiovascular:      Rate and Rhythm: Regular rhythm. Bradycardia present.      Pulses: Normal pulses.      Heart sounds: Normal heart sounds. No murmur heard.  Pulmonary:      Effort: Pulmonary effort is normal. No respiratory distress.      Breath sounds: Normal breath sounds. No stridor. No wheezing or rales.   Abdominal:      General: Abdomen is flat. Bowel sounds are normal. There is no distension.      Palpations: Abdomen is soft. There is no mass.      Tenderness: There is no abdominal tenderness.   Musculoskeletal:      Cervical back: Normal range of motion.    Skin:     General: Skin is warm.      Capillary Refill: Capillary refill takes less than 2 seconds.   Neurological:      General: No focal deficit present.      Mental Status: She is alert and oriented to person, place, and time. Mental status is at baseline.      Cranial Nerves: No cranial nerve deficit.   Psychiatric:         Mood and Affect: Mood normal.         Behavior: Behavior normal.         Fluids    Intake/Output Summary (Last 24 hours) at 1/2/2025 0543  Last data filed at 1/1/2025 1845  Gross per 24 hour   Intake 1460 ml   Output 2000 ml   Net -540 ml        Laboratory  Recent Labs     12/31/24  0320 01/01/25  0111 01/02/25  0405   WBC 11.7* 12.8* 10.9*   RBC 3.80* 3.83* 3.91*   HEMOGLOBIN 11.7* 11.6* 12.2   HEMATOCRIT 38.2 36.4* 37.4   .5* 95.0 95.7   MCH 30.8 30.3 31.2   MCHC 30.6* 31.9* 32.6   RDW 56.9* 53.1* 53.7*   PLATELETCT 220 265 216   MPV 9.7 9.9 9.9     Recent Labs     12/31/24  1357 01/01/25  0111 01/02/25  0405   SODIUM 132* 133* 135   POTASSIUM 4.7 4.5 4.5   CHLORIDE 96 97 98   CO2 21 22 26   GLUCOSE 103* 36* 41*   BUN 30* 31* 15   CREATININE 3.47* 3.62* 2.60*   CALCIUM 8.2* 8.5 7.9*                   Imaging  DX-CHEST-PORTABLE (1 VIEW)   Final Result      No acute process.           Assessment/Plan  * Hyperkalemia- (present on admission)  Assessment & Plan  1/2/2025  6.2 on presentation, history of ESRD.  She reported to me that her last dialysis session was 12/30 but told the EDP that her last dialysis was on 12/25  She follows with Dr. Carrera  Nephrology following for dialysis    Anemia due to chronic kidney disease, on chronic dialysis (HCC)- (present on admission)  Assessment & Plan  1/2/2025  Hemoglobin at baseline.  CBC ordered continue monitoring, transfuse if less than 8 due to history of CAD    Other cirrhosis of liver (HCC)- (present on admission)  Assessment & Plan  1/2/2025  Per notes from [facility] concern for primary sclerosing cholangitis based on liver biopsy but not  based on imaging  Monitor volume status     Acute right-sided thoracic back pain- (present on admission)  Assessment & Plan  1/2/2025  She has chronic low back pain for which she is on narcotics.  Presenting with acute right upper back pain around the scapula  On exam muscles around the scapula are tense and tender to palpation  She says she was helping a friend hang a picture frames prior to pain onset  CTA thoracoabdominal aorta done on 12/22 and his CT T-spine on the same date not concerning for acute abnormality  Musculoskeletal pain due to recent strenuous activity for the patient  Difficult to control due to history of narcotic dependence due to chronic low back pain.  IV morphine added, muscle relaxant added    Hypothyroidism, postsurgical- (present on admission)  Assessment & Plan  1/2/2025  Continue liothyronine and levothyroxine    ESRD needing dialysis (HCC)- (present on admission)  Assessment & Plan  1/2/2025  On HD MWF.  Nephrology consult in the morning  Renally dose all medication, avoid nephrotoxic agents    Chronic low back pain- (present on admission)  Assessment & Plan  1/2/2025  Continue home narcotics    Cholestatic liver disease- (present on admission)  Assessment & Plan  1/2/2025  Continue ursodiol and rifampin for cholestatic pruritus    Hyponatremia- (present on admission)  Assessment & Plan  1/2/2025  Chronic hyponatremia secondary to cirrhosis usually in the high 120s-low 130s  122 on presentation, she has been this low recently  BMP ordered to continue monitoring    Dyslipidemia- (present on admission)  Assessment & Plan  1/2/2025  Continue statin    Anxiety- (present on admission)  Assessment & Plan  1/2/2025  Continue venlafaxine    Primary hypertension- (present on admission)  Assessment & Plan  1/2/2025  Continue metoprolol, amlodipine    Tobacco abuse- (present on admission)  Assessment & Plan  Spent approx 5 mins on Tobacco cessation education . Discussed options of nicotine patch,  medical treatment with wellbutrin and chantix. Discussed the benefits of quitting smoking and risks of continued smoking including cardiovascular disease, cancer and COPD.   Code 02349  -I have ordered nicotine replacement therapy    Type 1 diabetes mellitus with hyperglycemia (HCC)- (present on admission)  Assessment & Plan  1/2/2025   Glucose 800 on presentation, she says she has been taking her insulin.  She also says she has been more confused lately and has been confused with dates on interview with different providers  Not acidosis and not in DKA.  Possible HHS due to confusion though that would be rare in DM1.  Serum osmole's pending  Continue insulin glargine and sliding scale added while in the hospital  Frequent fingerstick checks until improvement in her glucose levels         VTE prophylaxis: Heparin    I have performed a physical exam and reviewed and updated ROS and Plan today (1/2/2025). In review of yesterday's note (1/1/2025), there are no changes except as documented above.    Greater than 51 minutes spent prepping to see patient (e.g. review of tests) obtaining and/or reviewing separately obtained history. Performing a medically appropriate examination and/ evaluation.  Counseling and educating the patient/family/caregiver.  Ordering medications, tests, or procedures.  Referring and communicating with other health care professionals.  Documenting clinical information in EPIC.  Independently interpreting results and communicating results to patient/family/caregiver.  Care coordination.

## 2025-01-02 NOTE — DISCHARGE PLANNING
HTH/SCP TCN chart review completed. The most current review of medical record, knowledge of pt's PLOF and social support, LACE+ score of 79, 6 clicks scores of 19 mobility were considered.      Pt seen at bedside. Introduced TCN program. Provided education regarding post acute levels of care. Education provided regarding case management policy for blanket SNF referrals. Discussed HTH/SCP plan benefits. Pt verbalizes understanding.     Pt reports that she is anticipating that she will be functionally capable of dc to home once medically cleared. Note that she is agreeable to HH services if recommended and per review/chart, pt remains ambulatory with [facility]. Note that pt reports she has a 4WW and FWW if needed at time of dc. Note that pt has PT and OT consults in place and TCN will monitor for formal therapy recs in chart for dc planning.     No additional provider requests at this time. Given aforementioned, choice proactively obtained for HH and faxed to [facility] to use if indicated for dc planning.     Current discharge considerations are anticipated for dc to home with HH services if indicated by providers (see above). TCN will continue to follow and collaborate with discharge planning team as additional post acute needs arise. Thank you.     Completed today:  PT/OT consults in place  Choice obtained: HH (1) [facility] 2) [facility]  Pt aware of [facility]'s  blanket referral policy if indicated by providers  SCP with non [facility] PCP

## 2025-01-02 NOTE — CONSULTS
MRN: [id number]  Date of palliative consult: 2025  Reason for consult: ACP/GOC  Referring provider: Dr. Hoffman  Location of consult: [facility]  Additional consulting services: nephrology    HPI:   Anu Manuel is a 69 y.o. 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 CT of the thoracic spine on recent admission (2024) with severe DDD at C7/T1 with vertebral body height loss stable from previous CT scan 2022. Patient was found to be hyperkalemic with possible missed dialysis session. Patient was admitted and started on HD. PC was consulted for assistance with GOC discussions.    ROS:    Review of Systems   Respiratory:  Negative for shortness of breath.    Cardiovascular:  Positive for chest pain.   Musculoskeletal:  Positive for back pain and neck pain. Negative for falls.   Skin:  Positive for itching.       PE:   Recent vital signs  BMI: Body mass index is 20.28 kg/m².    Temp (24hrs), Av.3 °C (97.3 °F), Min:36.1 °C (97 °F), Max:36.5 °C (97.7 °F)  Temperature: 36.3 °C (97.3 °F)  Pulse  Av.1  Min: 58  Max: 86   Blood Pressure : 129/60       Physical Exam  Constitutional:       Appearance: She is ill-appearing.   Pulmonary:      Effort: Pulmonary effort is normal.   Musculoskeletal:      Comments: Left periscapular area TTP, pain reproducible    Skin:     General: Skin is warm and dry.   Neurological:      Mental Status: She is alert.       ASSESSMENT/PLAN WITH SHARED DECISION MAKING:   PHYSICAL ASPECTS OF CARE  Palliative Performance Scale: 50%    # ESRD on HD MWF  # Hyperkalemia (6.2mmol/L on admission)  #  "Cirrhosis of the liver, PSC  # Chronic pain  - Pain appears MSK in nature, reproducible on exam. Recommend continuation of multimodal pain approach. Discussed avoiding morphine in ESRD with pt and Dr. Kimble. Recommend establishing with pain management clinic outpatient, which may also be able to manage patient's pain with both interventions and pharmacological means. Pt will consider this.     SOCIAL ASPECTS OF CARE  Patient has 3 adult children, 2 daughters and 1 son.  She is closest with her daughter, Farzaneh. She also has a brother, Morgan, who lives locally and checks in on her frequently.  Patient is .  She lost her , mom, and brother within 3 weeks of each other about 4 years ago.  Patient is living alone and is indpendent in ADLs/IADLs.    SPIRITUAL ASPECTS OF CARE  Patient states that she is spiritual.  She states \"I believe in Fredy and aliens.\"  She declines a visit from a  at this time.  She expresses belief that there is a life after this and tearfully states that she hopes she can see her  again.    GOALS OF CARE/SERIOUS ILLNESS CONVERSATION  Consult received and EMR review. Case discussed with Dr. Kimble, updates appreciated.  Met with patient at bedside.  Introduced self and role of palliative care, reason for consultation.  Patient agreeable to goals of care discussion quickly shares \"I am ready to die at home.\"  PC APRN acknowledged this statement and provided empathetic support. Offered to include any support persons that patient desires.  Patient shares that she would like to include her daughter, Farzaneh.  She called her and placed her on speaker phone.  Again introduced self and role of palliative care. Farzaneh is thankful for the consultation and agreeable to [facility] discussion.     Patient and daughter provided update on the social and medical history. Assessed patient/family's understanding of current medical status, overall health picture, and options for future care. " "Demonstrates a good understanding of the current clinical picture. Patient recalls missing dialysis but is not sure how many sessions she missed.    Explored patient's expressed values, beliefs, and preferences in order to identify GOC.  Patient shares that she had her  at home on hospice and would like to be allowed to die in her own home when this time comes. She states several times throughout this discussion \"I don't want to go on living like this.\" PC APRN explored this further and patient states that her chronic pain has worsened.     Discussed benefit versus burden of continuing hemodialysis.  Patient shares that burden of this is beginning to outweigh benefit and she is taking longer to recover from her dialysis sessions.     Introduced the philosphy of hospice as a future care option. Educated that hospice is a group of caregivers who provide end of life care, focused on remaining quality of life rather than quantity of life. Discussed that the focus switches from disease modifying treatment to management of symptoms such as dyspnea, anxiety, nausea, pain, etc. Discussed that this would entail ceasing HD. Patient is familiar with hospice and shares that she had her  at home on hospice. It would be most in line with her goals to be on hospice at [facility] but would like more time to consider this with her family. Recommend ongoing GOC discussions with patient, family, and medical team. Discussed that HH  can assist with ongoing GOC discussions and transition to hospice as well.     Discussed the issue of code status. Patient and DPOA confirmed DNAR/DNI status. Educated on the importance of a POLST. Family shared that they had a POLST at one point but would like to complete a new one. POLST reviewed with pt and DPOA making selections for DNAR, selective treatment (no intubation) and no artificial nutrition. This was signed by pt and SUSAN DELGADO. A copy will be scanned into Epic. The original " was returned to the bedside.     Discussed AD on file naming Farzaneh as DPOA. Patient declines making any changes to this document.    Active listening, reflection, reminiscing, validation & normalization, and empathic support utilized throughout this encounter.  All questions answered and contact information provided.     Code Status: DNAR/DNI    ACP Documents: AD, POLST completed on this admission    26 minutes spent discussing advance care planning, this time excludes any other billed services.    Interval diagnostic studies and medical documentation entries pertinent to this case were reviewed independently by me. This patient has at least one acute or chronic illness or injury that poses a threat to life or bodily function. This patient suffers from a high risk of morbidity from additional invasive diagnostic testing or intensive treatment. Discussion of recommendations and coordination of care undertaken with primary provider/treatment team.      DANNY Emmanuel  Palliative Care Nurse Practitioner   [phone]

## 2025-01-02 NOTE — PROGRESS NOTES
Recieved report from RN. Assumed pt care. Patient on tele box and on RA. Patient A&O x4. Fall precautions in place, call light and belongings within reach, bed in lowest position. Patient up in bed and has no signs of distress.

## 2025-01-02 NOTE — CARE PLAN
The patient is Watcher - Medium risk of patient condition declining or worsening    Shift Goals  Clinical Goals: monitor BG, monitor labs    Progress made toward(s) clinical / shift goals:        Problem: Pain - Standard  Goal: Alleviation of pain or a reduction in pain to the patient’s comfort goal  Outcome: Progressing     Problem: Knowledge Deficit - Standard  Goal: Patient and family/care givers will demonstrate understanding of plan of care, disease process/condition, diagnostic tests and medications  Outcome: Progressing     Problem: Medication  Goal: Compliance with prescribed medication will improve  Outcome: Progressing     Problem: Fall Risk  Goal: Patient will remain free from falls  Outcome: Progressing

## 2025-01-02 NOTE — PROGRESS NOTES
[facility] Progress Note         HD today x 3 hours per Dr. Najjar.  Tx initiated at 1523 and ended at 1823    Pt A/O x 4, not in distress, no bleeding, denies chest pain, on room air, but c/o upper back pain 10/10. PCN was informed and oxycodone was given. RTDC patent, dsg CDI. LUE AVG (-) bruit and thrill (+) consent     NET UF: 1500 ml    Tx tolerated and UFG was met. Pt was mostly asleep during the treatment. Health education about the diet was provided. Blood returned, ports flushed with NS. Heparin 1000 units/ml used to lock catheter per designated amount. CVC ports clamped and capped. Aspirate heparin prior to next CVC use. See eflow sheets for further details.    Report given to BRITTANY Babb RN

## 2025-01-02 NOTE — PROGRESS NOTES
Monitor Summary  Rhythm: SR/SB  Rate: 53-64  Ectopy: (R) PVC 8beats BBB  Measurements: .12/.07/.45  ---12 hr Chart Review---

## 2025-01-03 ENCOUNTER — PHARMACY VISIT (OUTPATIENT)
Dept: PHARMACY | Facility: MEDICAL CENTER | Age: 70
End: 2025-01-03
Payer: COMMERCIAL

## 2025-01-03 VITALS
SYSTOLIC BLOOD PRESSURE: 137 MMHG | RESPIRATION RATE: 18 BRPM | HEIGHT: 66 IN | DIASTOLIC BLOOD PRESSURE: 58 MMHG | WEIGHT: 125.66 LBS | HEART RATE: 66 BPM | BODY MASS INDEX: 20.2 KG/M2 | TEMPERATURE: 97.6 F | OXYGEN SATURATION: 96 %

## 2025-01-03 PROBLEM — E87.5 HYPERKALEMIA: Status: RESOLVED | Noted: 2024-12-31 | Resolved: 2025-01-03

## 2025-01-03 LAB
ALBUMIN SERPL BCP-MCNC: 2.6 G/DL (ref 3.2–4.9)
BUN SERPL-MCNC: 22 MG/DL (ref 8–22)
CALCIUM ALBUM COR SERPL-MCNC: 8.4 MG/DL (ref 8.5–10.5)
CALCIUM SERPL-MCNC: 7.3 MG/DL (ref 8.5–10.5)
CHLORIDE SERPL-SCNC: 96 MMOL/L (ref 96–112)
CO2 SERPL-SCNC: 27 MMOL/L (ref 20–33)
CREAT SERPL-MCNC: 3.56 MG/DL (ref 0.5–1.4)
ERYTHROCYTE [DISTWIDTH] IN BLOOD BY AUTOMATED COUNT: 56.5 FL (ref 35.9–50)
GFR SERPLBLD CREATININE-BSD FMLA CKD-EPI: 13 ML/MIN/1.73 M 2
GLUCOSE BLD STRIP.AUTO-MCNC: 180 MG/DL (ref 65–99)
GLUCOSE BLD STRIP.AUTO-MCNC: 214 MG/DL (ref 65–99)
GLUCOSE SERPL-MCNC: 159 MG/DL (ref 65–99)
HCT VFR BLD AUTO: 36.6 % (ref 37–47)
HGB BLD-MCNC: 11.5 G/DL (ref 12–16)
MAGNESIUM SERPL-MCNC: 2.1 MG/DL (ref 1.5–2.5)
MCH RBC QN AUTO: 31 PG (ref 27–33)
MCHC RBC AUTO-ENTMCNC: 31.4 G/DL (ref 32.2–35.5)
MCV RBC AUTO: 98.7 FL (ref 81.4–97.8)
PHOSPHATE SERPL-MCNC: 2.4 MG/DL (ref 2.5–4.5)
PLATELET # BLD AUTO: 215 K/UL (ref 164–446)
PMV BLD AUTO: 10.4 FL (ref 9–12.9)
POTASSIUM SERPL-SCNC: 4.9 MMOL/L (ref 3.6–5.5)
RBC # BLD AUTO: 3.71 M/UL (ref 4.2–5.4)
SODIUM SERPL-SCNC: 131 MMOL/L (ref 135–145)
WBC # BLD AUTO: 11.2 K/UL (ref 4.8–10.8)

## 2025-01-03 PROCEDURE — 99232 SBSQ HOSP IP/OBS MODERATE 35: CPT | Performed by: INTERNAL MEDICINE

## 2025-01-03 PROCEDURE — 80069 RENAL FUNCTION PANEL: CPT

## 2025-01-03 PROCEDURE — 97535 SELF CARE MNGMENT TRAINING: CPT

## 2025-01-03 PROCEDURE — 97602 WOUND(S) CARE NON-SELECTIVE: CPT

## 2025-01-03 PROCEDURE — 99239 HOSP IP/OBS DSCHRG MGMT >30: CPT | Performed by: INTERNAL MEDICINE

## 2025-01-03 PROCEDURE — A9270 NON-COVERED ITEM OR SERVICE: HCPCS | Performed by: STUDENT IN AN ORGANIZED HEALTH CARE EDUCATION/TRAINING PROGRAM

## 2025-01-03 PROCEDURE — A9270 NON-COVERED ITEM OR SERVICE: HCPCS | Performed by: INTERNAL MEDICINE

## 2025-01-03 PROCEDURE — 700111 HCHG RX REV CODE 636 W/ 250 OVERRIDE (IP): Mod: JZ,TB | Performed by: INTERNAL MEDICINE

## 2025-01-03 PROCEDURE — 700102 HCHG RX REV CODE 250 W/ 637 OVERRIDE(OP): Performed by: INTERNAL MEDICINE

## 2025-01-03 PROCEDURE — 36415 COLL VENOUS BLD VENIPUNCTURE: CPT

## 2025-01-03 PROCEDURE — 700111 HCHG RX REV CODE 636 W/ 250 OVERRIDE (IP): Performed by: STUDENT IN AN ORGANIZED HEALTH CARE EDUCATION/TRAINING PROGRAM

## 2025-01-03 PROCEDURE — 83735 ASSAY OF MAGNESIUM: CPT

## 2025-01-03 PROCEDURE — 700102 HCHG RX REV CODE 250 W/ 637 OVERRIDE(OP): Performed by: STUDENT IN AN ORGANIZED HEALTH CARE EDUCATION/TRAINING PROGRAM

## 2025-01-03 PROCEDURE — 700111 HCHG RX REV CODE 636 W/ 250 OVERRIDE (IP): Performed by: INTERNAL MEDICINE

## 2025-01-03 PROCEDURE — 90935 HEMODIALYSIS ONE EVALUATION: CPT

## 2025-01-03 PROCEDURE — 85027 COMPLETE CBC AUTOMATED: CPT

## 2025-01-03 PROCEDURE — RXMED WILLOW AMBULATORY MEDICATION CHARGE: Performed by: INTERNAL MEDICINE

## 2025-01-03 PROCEDURE — 700101 HCHG RX REV CODE 250: Performed by: STUDENT IN AN ORGANIZED HEALTH CARE EDUCATION/TRAINING PROGRAM

## 2025-01-03 PROCEDURE — 82962 GLUCOSE BLOOD TEST: CPT | Mod: 91

## 2025-01-03 RX ORDER — NICOTINE 21 MG/24HR
1 PATCH, TRANSDERMAL 24 HOURS TRANSDERMAL EVERY 24 HOURS
Qty: 28 PATCH | Refills: 0 | Status: SHIPPED | OUTPATIENT
Start: 2025-01-03 | End: 2025-01-06

## 2025-01-03 RX ORDER — OXYCODONE HYDROCHLORIDE 15 MG/1
15 TABLET ORAL EVERY 4 HOURS PRN
Qty: 20 TABLET | Refills: 0 | Status: SHIPPED | OUTPATIENT
Start: 2025-01-03 | End: 2025-01-09 | Stop reason: DRUGHIGH

## 2025-01-03 RX ORDER — CYCLOBENZAPRINE HCL 10 MG
10 TABLET ORAL 3 TIMES DAILY PRN
Qty: 20 TABLET | Refills: 0 | Status: ON HOLD | OUTPATIENT
Start: 2025-01-03 | End: 2025-01-08

## 2025-01-03 RX ORDER — INSULIN LISPRO 100 [IU]/ML
0-9 INJECTION, SOLUTION INTRAVENOUS; SUBCUTANEOUS
Qty: 15 ML | Refills: 0 | Status: ON HOLD | OUTPATIENT
Start: 2025-01-03

## 2025-01-03 RX ADMIN — URSODIOL 300 MG: 300 CAPSULE ORAL at 11:49

## 2025-01-03 RX ADMIN — LIDOCAINE 1 PATCH: 4 PATCH TOPICAL at 07:49

## 2025-01-03 RX ADMIN — VENLAFAXINE HYDROCHLORIDE 150 MG: 75 CAPSULE, EXTENDED RELEASE ORAL at 05:36

## 2025-01-03 RX ADMIN — AMLODIPINE BESYLATE 10 MG: 10 TABLET ORAL at 05:32

## 2025-01-03 RX ADMIN — GABAPENTIN 100 MG: 100 CAPSULE ORAL at 11:52

## 2025-01-03 RX ADMIN — HEPARIN SODIUM 500 UNITS: 1000 INJECTION, SOLUTION INTRAVENOUS; SUBCUTANEOUS at 14:05

## 2025-01-03 RX ADMIN — POLYETHYLENE GLYCOL 3350 1 PACKET: 17 POWDER, FOR SOLUTION ORAL at 05:30

## 2025-01-03 RX ADMIN — HEPARIN SODIUM 1800 UNITS: 1000 INJECTION, SOLUTION INTRAVENOUS; SUBCUTANEOUS at 17:15

## 2025-01-03 RX ADMIN — OXYCODONE HYDROCHLORIDE 15 MG: 10 TABLET ORAL at 17:23

## 2025-01-03 RX ADMIN — INSULIN LISPRO 3 UNITS: 100 INJECTION, SOLUTION INTRAVENOUS; SUBCUTANEOUS at 11:56

## 2025-01-03 RX ADMIN — INSULIN LISPRO 2 UNITS: 100 INJECTION, SOLUTION INTRAVENOUS; SUBCUTANEOUS at 07:46

## 2025-01-03 RX ADMIN — OMEPRAZOLE 40 MG: 20 CAPSULE, DELAYED RELEASE ORAL at 05:34

## 2025-01-03 RX ADMIN — CYCLOBENZAPRINE 10 MG: 10 TABLET, FILM COATED ORAL at 11:53

## 2025-01-03 RX ADMIN — URSODIOL 300 MG: 300 CAPSULE ORAL at 05:38

## 2025-01-03 RX ADMIN — CYCLOBENZAPRINE 10 MG: 10 TABLET, FILM COATED ORAL at 05:33

## 2025-01-03 RX ADMIN — SEVELAMER CARBONATE 800 MG: 800 TABLET, FILM COATED ORAL at 11:52

## 2025-01-03 RX ADMIN — HYDROMORPHONE HYDROCHLORIDE 1 MG: 1 INJECTION, SOLUTION INTRAMUSCULAR; INTRAVENOUS; SUBCUTANEOUS at 03:28

## 2025-01-03 RX ADMIN — SEVELAMER CARBONATE 800 MG: 800 TABLET, FILM COATED ORAL at 07:50

## 2025-01-03 RX ADMIN — RIFAMPIN 300 MG: 300 CAPSULE ORAL at 05:35

## 2025-01-03 RX ADMIN — SENNOSIDES AND DOCUSATE SODIUM 2 TABLET: 50; 8.6 TABLET ORAL at 05:36

## 2025-01-03 RX ADMIN — OXYCODONE HYDROCHLORIDE 15 MG: 10 TABLET ORAL at 05:47

## 2025-01-03 RX ADMIN — OXYCODONE HYDROCHLORIDE 15 MG: 10 TABLET ORAL at 00:06

## 2025-01-03 RX ADMIN — HEPARIN SODIUM 5000 UNITS: 5000 INJECTION, SOLUTION INTRAVENOUS; SUBCUTANEOUS at 05:38

## 2025-01-03 RX ADMIN — LEVOTHYROXINE SODIUM 250 MCG: 0.12 TABLET ORAL at 05:33

## 2025-01-03 RX ADMIN — METOPROLOL SUCCINATE 100 MG: 50 TABLET, EXTENDED RELEASE ORAL at 05:34

## 2025-01-03 RX ADMIN — HYDROMORPHONE HYDROCHLORIDE 1 MG: 1 INJECTION, SOLUTION INTRAMUSCULAR; INTRAVENOUS; SUBCUTANEOUS at 07:50

## 2025-01-03 RX ADMIN — NICOTINE TRANSDERMAL SYSTEM 21 MG: 21 PATCH, EXTENDED RELEASE TRANSDERMAL at 05:31

## 2025-01-03 RX ADMIN — HEPARIN SODIUM 5000 UNITS: 5000 INJECTION, SOLUTION INTRAVENOUS; SUBCUTANEOUS at 13:08

## 2025-01-03 RX ADMIN — OXYCODONE HYDROCHLORIDE 15 MG: 10 TABLET ORAL at 11:53

## 2025-01-03 RX ADMIN — LIOTHYRONINE SODIUM 5 MCG: 5 TABLET ORAL at 05:34

## 2025-01-03 RX ADMIN — HEPARIN SODIUM 1500 UNITS: 1000 INJECTION, SOLUTION INTRAVENOUS; SUBCUTANEOUS at 14:10

## 2025-01-03 ASSESSMENT — ENCOUNTER SYMPTOMS
COUGH: 0
NAUSEA: 0
CHILLS: 0
VOMITING: 0
SHORTNESS OF BREATH: 0
BACK PAIN: 1
FEVER: 0

## 2025-01-03 ASSESSMENT — PAIN DESCRIPTION - PAIN TYPE
TYPE: ACUTE PAIN
TYPE: CHRONIC PAIN
TYPE: ACUTE PAIN

## 2025-01-03 NOTE — PROGRESS NOTES
Nephrology Daily Progress Note    Date of Service  1/3/2025    Chief Complaint  69 y.o. female admitted 12/31/2024 with chest pain    Interval Problem Update  Patient is doing better today  No chest pain or shortness of breath  Awaiting placement  1/3 patient is complaining of back pain  No chest pain or shortness of breath  Review of Systems  Review of Systems   Constitutional:  Negative for chills, fever and malaise/fatigue.   Respiratory:  Negative for cough and shortness of breath.    Cardiovascular:  Negative for chest pain and leg swelling.   Gastrointestinal:  Negative for nausea and vomiting.   Genitourinary:  Negative for dysuria, frequency and urgency.   Musculoskeletal:  Positive for back pain.        Physical Exam  Temp:  [36.3 °C (97.3 °F)-36.8 °C (98.2 °F)] 36.8 °C (98.2 °F)  Pulse:  [64-73] 64  Resp:  [16-17] 17  BP: ()/(55-73) 118/62  SpO2:  [91 %-97 %] 95 %    Physical Exam  Vitals and nursing note reviewed.   Constitutional:       General: She is not in acute distress.     Appearance: Normal appearance. She is well-developed. She is not diaphoretic.   HENT:      Head: Normocephalic and atraumatic.      Right Ear: External ear normal.      Left Ear: External ear normal.      Nose: Nose normal.   Eyes:      General: No scleral icterus.        Right eye: No discharge.         Left eye: No discharge.      Conjunctiva/sclera: Conjunctivae normal.   Cardiovascular:      Rate and Rhythm: Normal rate and regular rhythm.      Heart sounds: No murmur heard.  Pulmonary:      Effort: Pulmonary effort is normal. No respiratory distress.      Breath sounds: Normal breath sounds.   Musculoskeletal:         General: No tenderness.      Right lower leg: No edema.      Left lower leg: No edema.   Skin:     General: Skin is warm and dry.      Findings: No erythema.   Neurological:      General: No focal deficit present.      Mental Status: She is alert and oriented to person, place, and time.      Cranial  "Nerves: No cranial nerve deficit.   Psychiatric:         Mood and Affect: Mood normal.         Behavior: Behavior normal.         Fluids    Intake/Output Summary (Last 24 hours) at 1/3/2025 1430  Last data filed at 1/3/2025 0750  Gross per 24 hour   Intake 560 ml   Output 0 ml   Net 560 ml       Laboratory  Recent Labs     01/01/25  0111 01/02/25  0405 01/03/25  0218   WBC 12.8* 10.9* 11.2*   RBC 3.83* 3.91* 3.71*   HEMOGLOBIN 11.6* 12.2 11.5*   HEMATOCRIT 36.4* 37.4 36.6*   MCV 95.0 95.7 98.7*   MCH 30.3 31.2 31.0   MCHC 31.9* 32.6 31.4*   RDW 53.1* 53.7* 56.5*   PLATELETCT 265 216 215   MPV 9.9 9.9 10.4     Recent Labs     01/01/25  0111 01/02/25  0405 01/03/25  0218   SODIUM 133* 135 131*   POTASSIUM 4.5 4.5 4.9   CHLORIDE 97 98 96   CO2 22 26 27   GLUCOSE 36* 41* 159*   BUN 31* 15 22   CREATININE 3.62* 2.60* 3.56*   CALCIUM 8.5 7.9* 7.3*         No results for input(s): \"NTPROBNP\" in the last 72 hours.          Imaging  DX-CHEST-PORTABLE (1 VIEW)   Final Result      No acute process.            Assessment/Plan  1 ESRD  2 chest pain  3 respiratory failure  Plan for HD today  ANDREINA with dialysis  Renal diet  Daily BMP, CBC.  Renal dose all meds  Avoid nephrotoxins like NSAIDs.                  "

## 2025-01-03 NOTE — PROGRESS NOTES
Monitor Summary  Rhythm: SR  Rate: 62-78  Ectopy: (F) PVC (O) TRIG   Measurements: .12/.07/.41  ---12 hr Chart Review---

## 2025-01-03 NOTE — DISCHARGE PLANNING
ATTN: Case Management  RE: Referral for Home Health    As of 1/3/25, we have accepted the Home Health referral for the patient listed above.    [facility]  will contact the patient within 48 hours. If you have any questions or concerns regarding the patient’s transition to Home Health, please do not hesitate to contact us at [phone].      We look forward to collaborating with you,  [facility] Team

## 2025-01-03 NOTE — THERAPY
"Physical Therapy Contact Note    Patient Name: Anu Manuel  Age:  69 y.o., Sex:  female  Medical Record #: [id number]  Today's Date: 1/3/2025    PT mobility session attempted, pt reporting high pain and declining mobility. Did discuss pt's PT goals given recent palliative consult/discussion. Pt stating \"I don't know what I want, I'm confused, I need to think about it.\" in regards to overall goals of care. In regards to PT, pt reporting wishing to be able to walk when she gets home, would like to continue working with PT while in house. Pt declined trial of ice or heat for pain, reports lidocane patch with no improvement, agreeable to diaphragmatic breathing x10; reporting good relief. Will re-attempt mobility as appropriate.    Markie Mccartney PT, DPT        01/03/25 0831   Precautions   Precautions Fall Risk   Comments labile glucose   Vitals   O2 Delivery Device None - Room Air   Pain 0 - 10 Group   Therapist Pain Assessment Nurse Notified;Prior to Activity  (c/o high pain despite pain meds recent, improved with diaphragmatic breathing)   Cognition    Level of Consciousness Alert   Comments pleasant and cooperative, pain limited   Education Group   Education Provided Exercises - Supine   Exercises - Supine Patient Response Patient;Acceptance;Explanation;Demonstration;Teach Back;Verbal Demonstration;Action Demonstration  (diaphragmatic breathing x10, 1 hand belly one hand chest)   Physical Therapy Treatment Plan   Physical Therapy Treatment Plan Continue Current Treatment Plan   Anticipated Discharge Equipment and Recommendations   DC Equipment Recommendations None   Discharge Recommendations Recommend home health for continued physical therapy services   Interdisciplinary Plan of Care Collaboration   IDT Collaboration with  Nursing   Patient Position at End of Therapy In Bed;Bed Alarm On;Call Light within Reach;Tray Table within Reach;Phone within Reach   Session Information   Date / Session Number  1/3- 2 " (2/3, 1/8) edu only 1/3

## 2025-01-03 NOTE — RESPIRATORY CARE
" SMOKING CESSATION EDUCATION by COPD CLINICAL EDUCATOR  1/2/2025 at 4:17 PM by Renata Chadwick, RRT     Smoking Cessation Intervention and education completed, Patient declined cessation materials. g cessation packet with \"Tips to Quit\" and brochure for \"Free Smoking Cessation Classes\".   COPD Assessment  COPD Clinical Specialists ONLY  COPD Education Initiated: Yes--Short Intervention (met w/ pt denies COPDshe continues to smoke and declined cessation information. Palliative to see pt)  Interdisciplinary Rounds: Attendance at Rounds (30 Min)    PFT Results  No results found for: \"PFT\"  "

## 2025-01-03 NOTE — CARE PLAN
The patient is Stable - Low risk of patient condition declining or worsening    Shift Goals  Clinical Goals: monitor bg, labs  Patient Goals: rest, pain control  Family Goals: elissa    Progress made toward(s) clinical / shift goals:       Problem: Pain - Standard  Goal: Alleviation of pain or a reduction in pain to the patient’s comfort goal  Outcome: Progressing     Problem: Knowledge Deficit - Standard  Goal: Patient and family/care givers will demonstrate understanding of plan of care, disease process/condition, diagnostic tests and medications  Outcome: Progressing     Problem: Skin Integrity  Goal: Skin integrity is maintained or improved  Outcome: Progressing     Problem: Safety  Goal: Will remain free from injury  Outcome: Progressing  Goal: Will remain free from falls  Outcome: Progressing     Problem: Respiratory:  Goal: Respiratory status will improve  Outcome: Progressing     Problem: Fall Risk  Goal: Patient will remain free from falls  Outcome: Progressing     Problem: Nutrition Deficit  Goal: Adequate Food and Fluid Intake  Outcome: Progressing     Problem: Acute Care of the Diabetic Patient  Goal: Optimal Outcome for the Diabetic Patient  Outcome: Progressing       Patient is not progressing towards the following goals:

## 2025-01-03 NOTE — DISCHARGE PLANNING
Case Management Discharge Planning    Admission Date: 12/31/2024  GMLOS: 3.6  ALOS: 3    6-Clicks ADL Score: 20  6-Clicks Mobility Score: 20      Anticipated Discharge Dispo: Discharge Disposition: D/T to home under HHA care in anticipation of covered skilled care (06)    DME Needed: No    Action(s) Taken: Updated Provider/Nurse on Discharge Plan, DC Assessment Complete (See below), Choice obtained, and Referral(s) sent    0930,Pt was visited at room to obtain assessment information and discuss discharge planning. Home health choice obtained and fax to [facility]. IMM provided and explained to Pt. Pt's brother will provide transport home.      Escalations Completed: None    Medically Clear: Yes    Next Steps: CM will continue to assist Pt with discharge needs.      Barriers to Discharge: None      Care Transition Team Assessment  RN CM met with Pt at bedside to obtain assessment informations. Demographics verified. RN CM introduced self and purpose of visit.   Pt lives alone and able to care for self in a 1 story house with 2 steps to main entrance  Pt has  brother for support.  Pt has TOMAS COHENPKADI for PCP  Pt was independent with ADLs prior to hospitalization.  Pt has cane, FWW and 4WW at home.  Pt uses 1.5 LPM O2 as needed only.    Information Source  Orientation Level: Oriented X4  Information Given By: Patient    Elopement Risk  Legal Hold: No  Ambulatory or Self Mobile in Wheelchair: Yes  Disoriented: No  Psychiatric Symptoms: None  History of Wandering: No  Elopement this Admit: No  Vocalizing Wanting to Leave: No  Displays Behaviors, Body Language Wanting to Leave: No-Not at Risk for Elopement  Elopement Risk: Not at Risk for Elopement    Interdisciplinary Discharge Planning  Primary Care Physician: TOMAS COHENPKADI  Lives with - Patient's Self Care Capacity: Alone and Able to Care For Self  Support Systems: Family Member(s) (brother)  Housing / Facility: 1 Story House (with 2 steps to main  entrance)  Prior Services: Home-Independent    Discharge Preparedness  What is your plan after discharge?: Home health care  What are your discharge supports?: Sibling  Prior Functional Level: Ambulatory, Independent with Activities of Daily Living, Independent with Medication Management    Functional Assesment  Prior Functional Level: Ambulatory, Independent with Activities of Daily Living, Independent with Medication Management    Finances  Financial Barriers to Discharge: No  Prescription Coverage: Yes    Vision / Hearing Impairment  Right Eye Vision: Impaired, Wears Glasses  Left Eye Vision: Impaired, Wears Glasses    Domestic Abuse  Have you ever been the victim of abuse or violence?: No    Psychological Assessment  History of Substance Abuse: None  History of Psychiatric Problems: No    Anticipated Discharge Information  Discharge Disposition: D/T to home under HHA care in anticipation of covered skilled care (06)

## 2025-01-03 NOTE — DISCHARGE PLANNING
Outpatient Dialysis Note     Confirmed patient is active at:     [facility]  [address] [place] [id number]     Schedule: Mon, Wed, Fri   Time: 6:00 AM     Patient is followed by Dr. Anderson.     Spoke with Sepideh at facility who confirmed patient information.   Forwarded records for review.     Xitlaly Reyes   Dialysis Coordinator / [facility]  Ph: [phone]

## 2025-01-03 NOTE — CARE PLAN
The patient is Stable - Low risk of patient condition declining or worsening    Shift Goals  Clinical Goals: Dialysis, Pain Control  Patient Goals: Pain control  Family Goals: elissa    Progress made toward(s) clinical / shift goals:    Problem: Pain - Standard  Goal: Alleviation of pain or a reduction in pain to the patient’s comfort goal  Outcome: Progressing     Problem: Safety  Goal: Will remain free from injury  Outcome: Progressing     Problem: Acute Care of the Diabetic Patient  Goal: Optimal Outcome for the Diabetic Patient  Outcome: Progressing       Patient is not progressing towards the following goals:

## 2025-01-03 NOTE — DISCHARGE INSTRUCTIONS
Discharge Instructions per MIRANDA Tafoya.O.    DIET: Diet Order Diet: Consistent CHO (Diabetic); Second Modifier: (optional): Renal    ACTIVITY: As tolerated    A proper diet that is low in grease, fat, and salt, along with 30 minutes of exercise per day will lead to weight loss, and better controlled blood sugar and blood pressure.    DIAGNOSIS: Hyperkalemia    Follow up with your Primary Care Provider ZOË Maxwell as scheduled or sooner if your symptoms persist or worsen.  Return to Emergency Room for sever chest pain, shortness of breath, signs of a stroke, or any other emergencies.

## 2025-01-03 NOTE — DISCHARGE SUMMARY
Discharge Summary    CHIEF COMPLAINT ON ADMISSION  Chief Complaint   Patient presents with    Chest Pain     Pt BIB EMS for back pain that radiates to her chest x 3 days, sharp and shooting. Pt Hx: CKD on dialysis (M/W/F), liver failure, COPD, Cardiac stent x1         Reason for Admission  ems     Admission Date  12/31/2024    CODE STATUS  DNAR/DNI    HPI & HOSPITAL COURSE  Anu Manuel is a 69 y.o. female who presented 12/31/2024 with back pain.  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   Comes in complaining of back pain for the third time since 12/21.  She has chronic lower back pain but this current back pain is located in the right upper back around her shoulder blade.  She says she was helping her friend hanging up frames a day or so before the pain began.  On previous ED visits she had a CTA thoracoabdominal aorta on 12/22 due to this upper back pain which was not concerning for acute pathology.  She also had a CT T-spine which showed extensive degenerative disc disease which is stable since prior CT scan and no acute abnormalities.     In the ED afebrile, blood pressure significantly elevated with systolic in the 180s on presentation.  Labs showed a potassium of 6.2, glucose at 800.  She says her last dialysis session was on 12/30 though she reported a different date to the EDP saying it was on 12/25.     She says she has been compliant with her medication and taking her insulin and is not sure why it was so elevated.  Upon questioning she says she has been feeling more confused lately and just feels out of it.  Even though insulin given here was lower than home regimen her blood sugar quickly  dropped from 798 to hypoglycemic episodes as low as the 20s.  Patient denies having any shaking, tremors, sweats but she was more lethargic and unarousable during these times.  On 10/18 her A1c was 10.6.  She was not tolerating much oral intake.  Blood sugar was very labile initially being 798 on admission.  Even with just 10 units of Lantus in the evening her morning sugars are dropped down into the 20s.  She was kept on only short acting insulin sliding scale and will be discharged with this dose.  Counseled by consistent diet and to monitor her blood sugars at home.    Patient requested palliative evaluation for her chronic back pain prompting multiple admissions to the emergency room.  Her pain was controlled here with oxycodone 15 mg and Flexeril.  She did require breakthrough Dilaudid which was switched from morphine in the setting of her end-stage renal disease.  Patient is not yet interested in hospice and wishes to continue dialysis.  Ordered home with home health.  Outpatient referral for pain management was placed.    Medically stable to discharge home with home health.  Follow-up with primary care, nephrology as outpatient.      Therefore, she is discharged in fair and stable condition to home with organized home healthcare and close outpatient follow-up.    The patient met 2-midnight criteria for an inpatient stay at the time of discharge.    Discharge Date  1/3/2025      FOLLOW UP ITEMS POST DISCHARGE  None    DISCHARGE DIAGNOSES  Principal Problem (Resolved):    Hyperkalemia (POA: Yes)  Active Problems:    Type 1 diabetes mellitus with hyperglycemia (HCC) (POA: Yes)      Overview: ICD-10 transition    Tobacco abuse (Chronic) (POA: Yes)    Primary hypertension (POA: Yes)    Anxiety (POA: Yes)    Dyslipidemia (POA: Yes)    Hyponatremia (POA: Yes)    Cholestatic liver disease (POA: Yes)    Chronic low back pain (POA: Yes)    ESRD needing dialysis (HCC) (POA: Yes)    Hypothyroidism, postsurgical (POA:  Yes)    Acute right-sided thoracic back pain (POA: Yes)    Other cirrhosis of liver (HCC) (POA: Yes)    Anemia due to chronic kidney disease, on chronic dialysis (HCC) (POA: Yes)      FOLLOW UP  Future Appointments   Date Time Provider Department Center   1/29/2025  1:00 PM [place] [facility] [place] [facility]  [address]  [place] [address]  [phone]    Follow up      Jarrell Yates M.D.  [address]  [address]  [place] [address]  [phone]            MEDICATIONS ON DISCHARGE     Medication List        START taking these medications        Instructions   cyclobenzaprine 10 mg Tabs  Commonly known as: Flexeril   Take 1 Tablet by mouth 3 times a day as needed for Muscle Spasms.  Dose: 10 mg     insulin lispro 100 UNIT/ML Sopn injection PEN  Commonly known as: HumaLOG/AdmeLOG   Inject 0-9 Units under the skin 3 times a day before meals. 70 - 150 mg/dL = 0 Units 151 - 200 mg/dL = 2 Units 201 - 250 mg/dL = 3 Units 251 - 300 mg/dL = 5 Units 301 - 350 mg/dL = 6 Units 351 - 400 mg/dL = 8 Units Over 400 mg/dL = 9 Units  Dose: 0-9 Units     nicotine 21 MG/24HR Pt24  Commonly known as: Nicoderm   Place 1 Patch on the skin every 24 hours.  Dose: 1 Patch            CHANGE how you take these medications        Instructions   famotidine 20 MG Tabs  What changed: when to take this  Commonly known as: Pepcid   TAKE 1 TABLET BY MOUTH TWICE A DAY     FreeStyle John 3 Plus Sensor Misc  What changed: additional instructions   1 Each every 14 days.  Dose: 1 Each     Synthroid 125 MCG Tabs  What changed: additional instructions  Generic drug: levothyroxine   Take 2 Tablets by mouth every morning on an empty stomach.  Dose: 250 mcg            CONTINUE taking these medications        Instructions   amLODIPine 10 MG Tabs  Commonly known as: Norvasc   Take 10 mg by mouth every day.   Dose: 10 mg     atorvastatin 40 MG Tabs  Commonly known as: Lipitor   TAKE 1 TABLET BY MOUTH EVERY DAY  IN THE EVENING  Dose: 40 mg     gabapentin 100 MG Caps  Commonly known as: Neurontin   Doctor's comments: Take immediately after dialysis  Take 1 Capsule by mouth every Monday, Wednesday, and Friday.  Dose: 100 mg     lidocaine-prilocaine 2.5-2.5 % Crea  Commonly known as: Emla   Apply 1 g topically as needed (AVF prior to dialysis). APPLY TO AFFECTED AREA AVF ACCESS 30-60 MINS PRIOR TO DIALYSIS TREATMENT WRAP IN SARAN WRAP  Dose: 1 g     liothyronine 5 MCG Tabs  Commonly known as: Cytomel   Take 1 Tablet by mouth every day.  Dose: 5 mcg     metoprolol  MG Tb24  Commonly known as: Toprol XL   Take 100 mg by mouth every day.  Dose: 100 mg     omeprazole 40 MG delayed-release capsule  Commonly known as: PriLOSEC   Take 40 mg by mouth every day.   Dose: 40 mg     oxycodone 15 MG immediate release tablet  Commonly known as: Oxy-IR   Take 1 Tablet by mouth every four hours as needed for Severe Pain for up to 7 days.  Dose: 15 mg     pantoprazole 40 MG Tbec  Commonly known as: Protonix   Take 1 Tablet by mouth every day.  Dose: 40 mg     riFAMPin 300 MG Caps  Commonly known as: Rifadine   Take 300 mg by mouth 2 times a day.  Dose: 300 mg     Senokot S 8.6-50 MG Tabs  Generic drug: senna-docusate   Take 1 Tablet by mouth every day.   Dose: 1 Tablet     sevelamer 800 MG Tabs  Commonly known as: Renagel   Take 800 mg by mouth 3 times a day with meals.  Dose: 800 mg     traZODone 150 MG Tabs  Commonly known as: Desyrel   Take 150 mg by mouth at bedtime.   Dose: 150 mg     ursodiol 300 MG Caps  Commonly known as: Actigall   Take 1 Capsule by mouth 3 times a day.   Dose: 1 Capsule     venlafaxine 150 MG extended-release capsule  Commonly known as: Effexor-XR   Take 150 mg by mouth every day.   Dose: 150 mg     VITAMIN B-12 PO   Take 1 Tablet by mouth every day.   Dose: 1 Tablet     VITAMIN D (ERGOCALCIFEROL) PO   Take 1 Tablet by mouth 2 times a day.   Dose: 1 Tablet            STOP taking these medications      Lyumjev  KwikPen 100 UNIT/ML Sopn  Generic drug: Insulin Lispro-aabc (1 U Dial)     Toujeo SoloStar 300 UNIT/ML Sopn  Generic drug: Insulin Glargine (1 Unit Dial)              Allergies  Allergies   Allergen Reactions    Tape Unspecified and Rash     Blisters, paper tape is ok  Other reaction(s): Rash       DIET  Orders Placed This Encounter   Procedures    Diet Order Diet: Consistent CHO (Diabetic); Second Modifier: (optional): Renal     Standing Status:   Standing     Number of Occurrences:   1     Order Specific Question:   Diet:     Answer:   Consistent CHO (Diabetic) [4]     Order Specific Question:   Second Modifier: (optional)     Answer:   Renal [8]       ACTIVITY  As tolerated.  Weight bearing as tolerated    CONSULTATIONS  Palliative, nephrology    PROCEDURES  None    LABORATORY  Lab Results   Component Value Date    SODIUM 131 (L) 01/03/2025    POTASSIUM 4.9 01/03/2025    CHLORIDE 96 01/03/2025    CO2 27 01/03/2025    GLUCOSE 159 (H) 01/03/2025    BUN 22 01/03/2025    CREATININE 3.56 (H) 01/03/2025    CREATININE 1.0 01/08/2009        Lab Results   Component Value Date    WBC 11.2 (H) 01/03/2025    HEMOGLOBIN 11.5 (L) 01/03/2025    HEMATOCRIT 36.6 (L) 01/03/2025    PLATELETCT 215 01/03/2025        I discussed medications and side effects with the patient.  I discussed prognosis and importance of medical compliance with the patient.  I counseled the patient about diet, exercise, weight loss, smoking cessation, and life style modifications.  All questions and concerns have been addressed.  Total time of the discharge process was 39 minutes.

## 2025-01-03 NOTE — PROGRESS NOTES
Recieved report from RN. Assumed pt care. Patient on tele box and RA. Patient A&O x4. Fall precautions in place, call light and belongings within reach, bed in lowest position. No signs of distress.

## 2025-01-03 NOTE — DISCHARGE PLANNING
TCN following. HTH/SCP chart reviewed. No new TCN needs identified. Please see prior TCN note from 1/2 for most recent discharge planning considerations if indicated. Current discharge considerations are anticipated for dc to home with HH services as indicated by therapy/providersd (see above). Noted referral has been sent to [facility] (pending currently; will monitor for final acceptance). TCN will continue to follow and collaborate with discharge planning team as additional post acute needs arise. Thank you.      Completed:  PT and OT recs for HH on 1/2  Choice obtained: HH (1) [facility] 2) [facility]  Pt aware of [facility]'s CM blanket referral policy if indicated by providers  SCP with non [facility] PCP

## 2025-01-04 NOTE — WOUND TEAM
[facility]  Inpatient Services  Wound and Skin Care Brief Evaluation    Admission Date: 12/31/2024     Last order of IP CONSULT TO WOUND CARE was found on 1/2/2025 from Hospital Encounter on 12/31/2024     HPI, PMH, SH: Reviewed    Chief Complaint   Patient presents with    Chest Pain     Pt BIB EMS for back pain that radiates to her chest x 3 days, sharp and shooting. Pt Hx: CKD on dialysis (M/W/F), liver failure, COPD, Cardiac stent x1       Diagnosis: Hyperkalemia [E87.5]    Unit where seen by Wound Team: T732/02     Wound consult placed regarding L. heel. Chart and images reviewed. This discussed with dialysis RN . This clinician in to assess patient. Patient pleasant and agreeable. L. Heel . Non-selectively debrided with No rinse foam soap.       Unable to assess patient's sacrum or R. Heel at this time due to dialysis.  Patient stated that her skin is intact.      No pressure injuries or advanced wound care needs identified. Wound consult completed. No further follow up unless indicated and consulted.     Wound 01/02/25 Other (comment) Heel Left Fissure (Active)   Date First Assessed/Time First Assessed: 01/02/25 0752   Present on Original Admission: Yes  Primary Wound Type: Other (comment)  Location: Heel  Laterality: Left  Wound Description (Comments): Fissure      Assessments 1/3/2025  4:00 PM   Wound Image       Site Assessment Red;Dry;Callus   Periwound Assessment Dry   Margins Attached edges;Defined edges   Closure Adhesive bandage   Drainage Amount None   Treatments Cleansed;Nonselective debridement;Site care;Offloading   Offloading/DME Heel float boot   Wound Cleansing Foam Cleanser/Washcloth   Periwound Protectant No-sting Skin Prep   Dressing Status Clean;Dry;Intact   Dressing Changed New   Dressing Cleansing/Solutions Not Applicable   Dressing Options Hydrocolloid Thin;Offloading Dressing - Heel   Dressing Change/Treatment Frequency Every 72 hrs, and As Needed   NEXT Dressing  Change/Treatment Date 01/06/25   NEXT Weekly Photo (Inpatient Only) 01/10/25   Wound Team Following Not following       PREVENTATIVE INTERVENTIONS:    Q shift Zeus - performed per nursing policy  Q shift pressure point assessments - performed per nursing policy    Surface/Positioning  Standard/trauma mattress - Currently in Place  Reposition q 2 hours - Currently in Place    Offloading/Redistribution  Heel offloading dressing (Silicone dressing) - Applied this Visit  Heel float boots (Prevalon boot) - Applied this Visit      Mobilization      Up to chair

## 2025-01-04 NOTE — PROGRESS NOTES
[facility] Progress Note       HD ordered by Dr. Najjar. Treatment started at 1410 and ended at 1710.    Net UF removed: 1000mL.     Pt tolerated treatment well with no issues. See flow sheet for details.     CVC patent, locked with Heparin 1:1000 units; 1.8 mL to RED port and 1.8 mL to BLUE port post dialysis. No s/s of infection present. Dressing in place, CDI.     Report given to MINI Hermosillo.

## 2025-01-04 NOTE — PROGRESS NOTES
Patient discharged home with family. A&Ox4. IV's taken out, patient taken down via wheelchair and hospital escort. Monitor taken off; monitor room notified. Patient belongings discharged with patient, including clothes, and [id number]. Discharge summary reviewed with patient. RN provided education regarding follow up care, appointments, and medications. RN also provided education regarding when to call doctor and when to call 911.

## 2025-01-06 ENCOUNTER — APPOINTMENT (OUTPATIENT)
Dept: RADIOLOGY | Facility: MEDICAL CENTER | Age: 70
End: 2025-01-06
Attending: EMERGENCY MEDICINE
Payer: MEDICARE

## 2025-01-06 ENCOUNTER — HOSPITAL ENCOUNTER (EMERGENCY)
Facility: MEDICAL CENTER | Age: 70
End: 2025-01-06
Attending: EMERGENCY MEDICINE
Payer: MEDICARE

## 2025-01-06 VITALS
BODY MASS INDEX: 22.45 KG/M2 | HEIGHT: 62 IN | WEIGHT: 122 LBS | TEMPERATURE: 97.1 F | HEART RATE: 59 BPM | OXYGEN SATURATION: 97 % | RESPIRATION RATE: 14 BRPM | SYSTOLIC BLOOD PRESSURE: 120 MMHG | DIASTOLIC BLOOD PRESSURE: 58 MMHG

## 2025-01-06 DIAGNOSIS — G89.29 CHRONIC MIDLINE THORACIC BACK PAIN: ICD-10-CM

## 2025-01-06 DIAGNOSIS — Z99.2 ESRD (END STAGE RENAL DISEASE) ON DIALYSIS (HCC): ICD-10-CM

## 2025-01-06 DIAGNOSIS — N18.6 ESRD (END STAGE RENAL DISEASE) ON DIALYSIS (HCC): ICD-10-CM

## 2025-01-06 DIAGNOSIS — M54.6 CHRONIC MIDLINE THORACIC BACK PAIN: ICD-10-CM

## 2025-01-06 LAB
ALBUMIN SERPL BCP-MCNC: 3.3 G/DL (ref 3.2–4.9)
ALBUMIN/GLOB SERPL: 0.8 G/DL
ALP SERPL-CCNC: 562 U/L (ref 30–99)
ALT SERPL-CCNC: 25 U/L (ref 2–50)
ANION GAP SERPL CALC-SCNC: 8 MMOL/L (ref 7–16)
AST SERPL-CCNC: 49 U/L (ref 12–45)
BASOPHILS # BLD AUTO: 1.3 % (ref 0–1.8)
BASOPHILS # BLD: 0.12 K/UL (ref 0–0.12)
BILIRUB SERPL-MCNC: 0.6 MG/DL (ref 0.1–1.5)
BUN SERPL-MCNC: 40 MG/DL (ref 8–22)
CALCIUM ALBUM COR SERPL-MCNC: 8.8 MG/DL (ref 8.5–10.5)
CALCIUM SERPL-MCNC: 8.2 MG/DL (ref 8.5–10.5)
CHLORIDE SERPL-SCNC: 97 MMOL/L (ref 96–112)
CO2 SERPL-SCNC: 29 MMOL/L (ref 20–33)
CREAT SERPL-MCNC: 3.85 MG/DL (ref 0.5–1.4)
EOSINOPHIL # BLD AUTO: 0.28 K/UL (ref 0–0.51)
EOSINOPHIL NFR BLD: 3.1 % (ref 0–6.9)
ERYTHROCYTE [DISTWIDTH] IN BLOOD BY AUTOMATED COUNT: 56.1 FL (ref 35.9–50)
GFR SERPLBLD CREATININE-BSD FMLA CKD-EPI: 12 ML/MIN/1.73 M 2
GLOBULIN SER CALC-MCNC: 4.3 G/DL (ref 1.9–3.5)
GLUCOSE SERPL-MCNC: 147 MG/DL (ref 65–99)
HCT VFR BLD AUTO: 35.4 % (ref 37–47)
HGB BLD-MCNC: 11 G/DL (ref 12–16)
IMM GRANULOCYTES # BLD AUTO: 0.03 K/UL (ref 0–0.11)
IMM GRANULOCYTES NFR BLD AUTO: 0.3 % (ref 0–0.9)
LYMPHOCYTES # BLD AUTO: 1.12 K/UL (ref 1–4.8)
LYMPHOCYTES NFR BLD: 12.6 % (ref 22–41)
MCH RBC QN AUTO: 30.8 PG (ref 27–33)
MCHC RBC AUTO-ENTMCNC: 31.1 G/DL (ref 32.2–35.5)
MCV RBC AUTO: 99.2 FL (ref 81.4–97.8)
MONOCYTES # BLD AUTO: 0.78 K/UL (ref 0–0.85)
MONOCYTES NFR BLD AUTO: 8.8 % (ref 0–13.4)
NEUTROPHILS # BLD AUTO: 6.58 K/UL (ref 1.82–7.42)
NEUTROPHILS NFR BLD: 73.9 % (ref 44–72)
NRBC # BLD AUTO: 0 K/UL
NRBC BLD-RTO: 0 /100 WBC (ref 0–0.2)
NT-PROBNP SERPL IA-MCNC: 2495 PG/ML (ref 0–125)
PLATELET # BLD AUTO: 206 K/UL (ref 164–446)
PMV BLD AUTO: 10.3 FL (ref 9–12.9)
POTASSIUM SERPL-SCNC: 5.3 MMOL/L (ref 3.6–5.5)
PROT SERPL-MCNC: 7.6 G/DL (ref 6–8.2)
RBC # BLD AUTO: 3.57 M/UL (ref 4.2–5.4)
SODIUM SERPL-SCNC: 134 MMOL/L (ref 135–145)
TROPONIN T SERPL-MCNC: 72 NG/L (ref 6–19)
WBC # BLD AUTO: 8.9 K/UL (ref 4.8–10.8)

## 2025-01-06 PROCEDURE — 85025 COMPLETE CBC W/AUTO DIFF WBC: CPT

## 2025-01-06 PROCEDURE — 71045 X-RAY EXAM CHEST 1 VIEW: CPT

## 2025-01-06 PROCEDURE — 80053 COMPREHEN METABOLIC PANEL: CPT

## 2025-01-06 PROCEDURE — 96375 TX/PRO/DX INJ NEW DRUG ADDON: CPT

## 2025-01-06 PROCEDURE — 36415 COLL VENOUS BLD VENIPUNCTURE: CPT

## 2025-01-06 PROCEDURE — 700105 HCHG RX REV CODE 258: Performed by: EMERGENCY MEDICINE

## 2025-01-06 PROCEDURE — 99284 EMERGENCY DEPT VISIT MOD MDM: CPT

## 2025-01-06 PROCEDURE — 700101 HCHG RX REV CODE 250: Performed by: EMERGENCY MEDICINE

## 2025-01-06 PROCEDURE — 700111 HCHG RX REV CODE 636 W/ 250 OVERRIDE (IP): Mod: JZ,TB | Performed by: EMERGENCY MEDICINE

## 2025-01-06 PROCEDURE — 83880 ASSAY OF NATRIURETIC PEPTIDE: CPT

## 2025-01-06 PROCEDURE — 84484 ASSAY OF TROPONIN QUANT: CPT

## 2025-01-06 PROCEDURE — 96374 THER/PROPH/DIAG INJ IV PUSH: CPT

## 2025-01-06 RX ORDER — URSODIOL 300 MG/1
300 CAPSULE ORAL 3 TIMES DAILY
Status: ON HOLD | COMMUNITY

## 2025-01-06 RX ORDER — HYDROMORPHONE HYDROCHLORIDE 1 MG/ML
1 INJECTION, SOLUTION INTRAMUSCULAR; INTRAVENOUS; SUBCUTANEOUS ONCE
Status: COMPLETED | OUTPATIENT
Start: 2025-01-06 | End: 2025-01-06

## 2025-01-06 RX ORDER — LIDOCAINE 4 G/G
1 PATCH TOPICAL EVERY 24 HOURS
Status: DISCONTINUED | OUTPATIENT
Start: 2025-01-06 | End: 2025-01-06 | Stop reason: HOSPADM

## 2025-01-06 RX ORDER — MULTIVITAMIN WITH IRON
500 TABLET ORAL DAILY
Status: ON HOLD | COMMUNITY

## 2025-01-06 RX ORDER — VITAMIN B COMPLEX
1000 TABLET ORAL 2 TIMES DAILY
Status: ON HOLD | COMMUNITY

## 2025-01-06 RX ORDER — LIDOCAINE 50 MG/G
1 PATCH TOPICAL EVERY 24 HOURS
Qty: 10 PATCH | Refills: 0 | Status: SHIPPED | OUTPATIENT
Start: 2025-01-06 | End: 2025-01-22

## 2025-01-06 RX ADMIN — KETAMINE HYDROCHLORIDE 25 MG: 10 INJECTION INTRAMUSCULAR; INTRAVENOUS at 14:29

## 2025-01-06 RX ADMIN — HYDROMORPHONE HYDROCHLORIDE 1 MG: 1 INJECTION, SOLUTION INTRAMUSCULAR; INTRAVENOUS; SUBCUTANEOUS at 13:10

## 2025-01-06 RX ADMIN — LIDOCAINE 1 PATCH: 4 PATCH TOPICAL at 16:28

## 2025-01-06 ASSESSMENT — PAIN DESCRIPTION - PAIN TYPE
TYPE: ACUTE PAIN

## 2025-01-06 ASSESSMENT — FIBROSIS 4 INDEX: FIB4 SCORE: 2.07

## 2025-01-06 NOTE — PROGRESS NOTES
Transitional Care Management  TCM Outreach Date and Time: Filed (1/6/2025  9:29 AM)    Discharge Questions  Actual Discharge Date: 01/03/25  Now that you are home, how are you feeling?: Fair (Patient's DPOA reported that the patient was feeling fantastic after discharge, however this morning the patient's son reported to the DPOA that the patient had concerning symptoms. Patient's DPOA verbalized that she will have the patient visit the ER.)  Did you receive any new prescriptions?: Yes (Reviewed START: Cyclyobenzaprine, insulin, nicotine patch; CHANGE: Synthroid; Patient's DPOA aware of AVS summary medication instructions, however she does not have the patient's medications with her to complete a Medication Review at this time.)  Were you able to get them filled?: Yes  Meds to Bed or Pharmacy filled?: Meds to Bed  Do you have any questions about your current medications or new medications (Review Med Rec)?: No  Did you have any durable medical equipment ordered?: No  Do you have a follow up appointment scheduled with your PCP?: No  Was an appointment scheduled for the patient?: No  Any issues or paperwork you wish to discuss with your PCP?: No  Are you (patient) able to get to the appointment?: Yes  Reason not scheduled?: Patient to Schedule  If Home Health was ordered, have they contacted you (Patient): Yes ([phone])  Did you have enough support after your last discharge?: Yes  Does this patient qualify for the CCM program?: Yes (Currently enrolled in [facility])    Transitional Care  Number of attempts made to contact patient: 1  Current or previous attempts completed within two business days of discharge? : Yes  Provided education regarding treatment plan, medications, self-management, ADLs?: Yes  Has patient completed an Advanced Directive?: Yes  Is the patient's advanced directive on file?: Yes  Has the Care Manager's phone number provided?: Yes ([phone])  Is there anything else I can help you with?:  No    Discharge Summary  Chief Complaint: Chest pain  Admitting Diagnosis: Hyperkalemia (E87.5)  Discharge Diagnosis: Hyperkalemia       Patient's DPOA reported that the patient was feeling fantastic after discharge, however this morning the patient's son (who is currently with the patient) reported to the DPOA that the patient felt weak with numbness/tingling in one arm and weakness in both arms. Patient's DPOA reported that she told him (patient's son) to bring the patient to the ER. I agreed with the patient's DPOA and urged her to call an ambulance for the patient so they may bring the patient to the ER in order to seek further evaluation. Patient's DPOA verbalized understanding and verbalized intent to follow this recommendation in order to have the patient evaluated at the ER. PHYLICIA F.A.S.T. stroke symptoms acronym provided and reviewed and patient's DPOA verbalized understanding. All questions answered.

## 2025-01-06 NOTE — PROGRESS NOTES
[facility] was called and they confirmed that the patient is to be starting home health once she receives an additional call this week to schedule the home visit dates and times. As patient is starting home health this week/month, I am deferring the monthly call to potentially next month until the patient is discharged from home health. Patient's DPOA was notified during TCM call and she verbalized understanding. All questions answered.

## 2025-01-06 NOTE — ED NOTES
Med rec updated and complete. Allergies reviewed.      Pt confirmed name and date of birth.      Pt was suppose to have dialysis today , but didn't feel well and came here.   Placed call to [facility] [phone]. Pt last received Mircera on 12/16/24 30 mcg . Pt receives mircera every 4 weeks , next dose do  01/16/25. No antibiotics administered during dialysis.      Pt stated she didn't take any medications today 01/06/25.    Rifampin filled 11/14/24. Pt is currently out of sevelamer and hasn't taken it at home for approximately 1 month.    Pt denies anticoagulant and antiplatelet medications.        Preferred Pharmacy  [facility] = [phone]    Discharge prescriptions filled at [facility]   [phone]

## 2025-01-06 NOTE — ED PROVIDER NOTES
"CHIEF COMPLAINT  Chief Complaint   Patient presents with    Back Pain     Mid scapular pain, sharp in nature. Since last night.        LIMITATION TO HISTORY   Select: none    HPI    Anu Manuel is a 69 y.o. female who presents to the Emergency Department via EMS for acute mid-scapular pain onset last night. Patient reports that her symptoms had a sudden onset. She states that she was seen here last weekend for the same thing and reports that they did not do anything. Patient states that she is unable to breath due to her pain. There were no alleviating factors reported. Denies seeing pain specialist. Patient is noted to be on dialysis. She has a history of diabetes and hypertension.     OUTSIDE HISTORIAN(S):  Select: None    EXTERNAL RECORDS REVIEWED  Select: Patient was seen on 12/22/24 and had a CT-CTA Thoracoabdominal aorta done with the impressions shown below:    IMPRESSION:     1.  No aortic aneurysm or dissection identified.  2.  Atherosclerosis and atherosclerotic coronary artery disease  3.  Common bile duct dilatation, commonly associated with post cholecystectomy physiology, consider causes of biliary obstruction with additional workup as clinically appropriate.  4.  Hepatomegaly  5.  Irregular hepatic contour favoring changes of cirrhosis  6.  Pulmonary nodule, see nodule follow-up recommendations below.      Had a CT-Tspine done that day as well, with the impressions shown below:    IMPRESSION:     1.  No acute traumatic bony injury of the thoracic spine.  2.  Extensive degenerative disc disease at C7/T1 with vertebral body height loss, stable since CT chest December 28, 2022  3.  Atherosclerosis and atherosclerotic coronary artery disease      Patient's Troponin is noted to always be elevated at baseline, in the upper 70's to 80's.     PAST MEDICAL HISTORY  Past Medical History:   Diagnosis Date    Accidental drug overdose 08/27/2012    Adverse effect of anesthesia     in 2008 \"throat " "closes up\"\"anxiety\" ?laryngospasm, kept in ICU. Pt states no problems currently 2018.     Anemia     Anesthesia     in 2008 \"throat closes up\"\"anxiety\" ?laryngospasm, kept in ICU. Pt states no problems currently 2018.     Arrhythmia     A FIB    Arthritis     right shoulder, hands, OSTEO    Bowel habit changes More frequent    Breath shortness     Broken hip (HCC) 04/2024    Broken hip possibly the pelvis.  Inoperable    Cataract 2022    BILAT IOL    Cigarette smoker quit 2013    Dental disorder     upper denture    depression 08/30/2016    denies depression, states has anxiety and panic attacks    Diabetes mellitus type 1 (HCC) 1989    insulin    Dialysis patient (HCC) Short time due to a kidney injury from a infection    [facility]ALYS[facility], [facility]    Emphysema of lung (HCC)     COPD    Encounter for long-term (current) use of insulin (HCC) 09/25/2013    GI bleed     Heart burn     High cholesterol     Hypertension     Hypothyroidism, postsurgical 1970    Indigestion     Infectious disease      had hepatitis C, tested neg.    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) 06/10/2015    Vertigo      SURGICAL HISTORY  Past Surgical History:   Procedure Laterality Date    AV FISTULA REVISION Left 12/5/2024    Procedure: LIGATION OF LEFT UPPER ARM DIALYSIS GRAFT;  Surgeon: Denis Brown M.D.;  Location: [facility];  Service: General    PB REMOVE PERM CANNULA/CATHETER  09/23/2024    Procedure: REMOVAL OF PERITONEAL DIALYSIS CATHETER;  Surgeon: Denis Brown M.D.;  Location: [facility] [place];  Service: General    AV FISTULA CREATION Left 09/23/2024    Procedure: CREATION OF LEFT UPPER EXTREMITY DIALYSIS GRAFT;  Surgeon: Denis Brown M.D.;  Location: [facility];  Service: General    PB LAP INSERT INTRAPERITONEAL CATHETER  06/20/2024    Procedure: LAPAROSCOPIC INSERTION OF PERITONEAL DIALYSIS CATHETER;  Surgeon: Denis Brown M.D.;  Location: [facility];  Service: General    VT ERCP,DIAGNOSTIC N/A 01/14/2022    Procedure: ERCP, DIAGNOSTIC - WITH SIGMOID COLON BIOPSY AND STENT REMOVAL;  Surgeon: Cr Haro M.D.;  Location: [facility];  Service: Gastroenterology    THADDEUS BY LAPAROSCOPY N/A 10/28/2021    Procedure: CHOLECYSTECTOMY, LAPAROSCOPIC;  Surgeon: Tello Trammell M.D.;  Location: [facility];  Service: General    VT ERCP,DIAGNOSTIC N/A 10/26/2021    Procedure: ERCP (ENDOSCOPIC RETROGRADE CHOLANGIOPANCREATOGRAPHY);  Surgeon: Cr Haro M.D.;  Location: SURGERY SAME DAY [facility];  Service: Gastroenterology    VT UPPER GI ENDOSCOPY,BIOPSY N/A 10/26/2021    Procedure: GASTROSCOPY, WITH BIOPSY;  Surgeon: Cr Haro M.D.;  Location: SURGERY SAME DAY [facility];  Service: Gastroenterology    VT UPPER GI ENDOSCOPY,DIAGNOSIS N/A 10/26/2021    Procedure: GASTROSCOPY;  Surgeon: Cr Haro M.D.;  Location: SURGERY SAME DAY [facility];  Service: Gastroenterology    CATH PLACEMENT  01/25/2020    Procedure: INSERTION, CATHETER PERM;  Surgeon: Rola Mendoza M.D.;  Location: [facility];  Service: General    IRRIGATION & DEBRIDEMENT NEURO  01/19/2020    Procedure: IRRIGATION AND DEBRIDEMENT, WOUND THORACIC AND LUMBAR;  Surgeon: Ryan Roman M.D.;  Location: [facility];  Service: Neurosurgery    VT INS/RPLC SPI NPG/RCVR POCKET N/A 12/16/2019    Procedure: EXPLORATION AT THORACIC 8 - 9, REPLACEMENT OF  NEUROSTIMULATOR LEAD;  Surgeon: Ryan Roman M.D.;  Location: [facility];  Service: Neurosurgery    SPINAL CORD  STIMULATOR N/A 10/26/2018    Procedure: SPINAL CORD STIMULATOR;  Surgeon: Ryan Roman M.D.;  Location: SURGERY [facility];  Service: Neurosurgery    THORACIC LAMINECTOMY N/A 10/26/2018    Procedure: THORACIC LAMINECTOMY - FOR;  Surgeon: Ryan Roman M.D.;  Location: SURGERY [facility];  Service: Neurosurgery    CO NJX AA&/STRD TFRML EPI LUMBAR/SACRAL 1 LEVEL Right 08/31/2016    Procedure: INJ-FORAMEN EPI LUM/SAC SNGL L4-5;  Surgeon: Sukhi Godfrey M.D.;  Location: SURGERY [facility];  Service: Pain Management    LUMBAR LAMINECTOMY DISKECTOMY Right 05/10/2016    Procedure: LUMBAR L4-5 HEMILAMINECTOMY DISKECTOMY ;  Surgeon: Arnold Keyes M.D.;  Location: [facility];  Service:     CERVICAL FUSION POSTERIOR  01/16/2009    Performed by TARA CONTRERAS at [facility]    HARDWARE REMOVAL NEURO  01/16/2009    Performed by TARA CONTRERAS at [facility]    NECK EXPLORATION  01/16/2009    Performed by TARA CONTRERAS at [facility]    SHOULDER ARTHROSCOPY W/ ROTATOR CUFF REPAIR  10/09/2008    Performed by SHERLY CASTANEDA at [facility] ORS    SHOULDER DECOMPRESSION ARTHROSCOPIC  06/17/2008    Performed by SHERLY CASTANEDA at [facility] ORS    CLAVICLE DISTAL EXCISION  06/17/2008    Performed by SHERLY CASTANEDA at [facility] ORS    CERVICAL DISK AND FUSION ANTERIOR  03/12/2008    HYSTERECTOMY, VAGINAL  2006    PARTIAL    APPENDECTOMY  2004    THYROID LOBECTOMY  1973    TONSILLECTOMY  1963    CATARACT PHACO WITH IOL      LUMPECTOMY  1976, 2005    Breast     LUMPECTOMY      OTHER ABDOMINAL SURGERY  2004    OTHER CARDIAC SURGERY  2020 stents inserted     FAMILY HISTORY  Family History   Problem Relation Age of Onset    Hypertension Mother     Cancer Father       SOCIAL HISTORY  Social History     Tobacco Use    Smoking status: Every Day     Current packs/day: 0.50     Average packs/day: 0.5 packs/day for 30.0 years (15.0 ttl pk-yrs)      Types: Cigarettes    Smokeless tobacco: Never    Tobacco comments:     Currently smokes 1/2 - 3/4 a pack daily.  Has smoked for about 50 years.   Vaping Use    Vaping status: Never Used   Substance Use Topics    Alcohol use: Not Currently    Drug use: Not Currently     Types: Inhaled, Oral, Marijuana     Comment: Marijuana - Occasional; edibles     CURRENT MEDICATIONS  Current Outpatient Medications Prior to Encounter   Medication Sig Dispense Refill    nicotine (NICODERM) 21 MG/24HR PATCH 24 HR Place 1 Patch on the skin every 24 hours. 28 Patch 0    insulin lispro 100 UNIT/ML SC SOPN injection PEN Inject 0-9 Units under the skin 3 times a day before meals. 70 - 150 mg/dL = 0 Units 151 - 200 mg/dL = 2 Units 201 - 250 mg/dL = 3 Units 251 - 300 mg/dL = 5 Units 301 - 350 mg/dL = 6 Units 351 - 400 mg/dL = 8 Units Over 400 mg/dL = 9 Units 15 mL 0    cyclobenzaprine (FLEXERIL) 10 mg Tab Take 1 Tablet by mouth 3 times a day as needed for Muscle Spasms. 20 Tablet 0    oxycodone (OXY-IR) 15 MG immediate release tablet Take 1 Tablet by mouth every four hours as needed for Severe Pain for up to 7 days. 20 Tablet 0    sevelamer (RENAGEL) 800 MG Tab Take 800 mg by mouth 3 times a day with meals.      pantoprazole (PROTONIX) 40 MG Tablet Delayed Response Take 1 Tablet by mouth every day. 30 Tablet 0    famotidine (PEPCID) 20 MG Tab TAKE 1 TABLET BY MOUTH TWICE A DAY (Patient taking differently: Take 20 mg by mouth 2 times a day.) 180 Tablet 3    atorvastatin (LIPITOR) 40 MG Tab TAKE 1 TABLET BY MOUTH EVERY DAY IN THE EVENING 100 Tablet 0    riFAMPin (RIFADINE) 300 MG Cap Take 300 mg by mouth 2 times a day.      lidocaine-prilocaine (EMLA) 2.5-2.5 % Cream Apply 1 g topically as needed (AVF prior to dialysis). APPLY TO AFFECTED AREA AVF ACCESS 30-60 MINS PRIOR TO DIALYSIS TREATMENT WRAP IN SARAN WRAP      gabapentin (NEURONTIN) 100 MG Cap Take 1 Capsule by mouth every Monday, Wednesday, and Friday. 30 Capsule 0    Continuous  "Glucose Sensor (FREESTYLE PARISA 3 PLUS SENSOR) Misc 1 Each every 14 days. (Patient taking differently: 1 Each every 14 days. Placed 12/30/2024) 2 Each 11    ursodiol (ACTIGALL) 300 MG Cap Take 1 Capsule by mouth 3 times a day.         metoprolol SR (TOPROL XL) 100 MG TABLET SR 24 HR Take 100 mg by mouth every day.      SYNTHROID 125 MCG Tab Take 2 Tablets by mouth every morning on an empty stomach. 180 Tablet 2    amLODIPine (NORVASC) 10 MG Tab Take 10 mg by mouth every day.         Cyanocobalamin (VITAMIN B-12 PO) Take 1 Tablet by mouth every day.         senna-docusate (SENOKOT S) 8.6-50 MG Tab Take 1 Tablet by mouth every day.         venlafaxine (EFFEXOR-XR) 150 MG extended-release capsule Take 150 mg by mouth every day.         liothyronine (CYTOMEL) 5 MCG Tab Take 1 Tablet by mouth every day. 90 Tablet 3    omeprazole (PRILOSEC) 40 MG delayed-release capsule Take 40 mg by mouth every day.         VITAMIN D, ERGOCALCIFEROL, PO Take 1 Tablet by mouth 2 times a day.         traZODone (DESYREL) 150 MG Tab Take 150 mg by mouth at bedtime.          ALLERGIES  Allergies   Allergen Reactions    Tape Unspecified and Rash     Blisters, paper tape is ok  Other reaction(s): Rash     PHYSICAL EXAM  VITAL SIGNS:/56   Pulse (!) 59   Resp 14   Ht 1.575 m (5' 2\")   Wt 55.3 kg (122 lb)   LMP 04/29/2005 (LMP Unknown)   SpO2 96%   BMI 22.31 kg/m²       Constitutional: Well-developed, Significant discomfort, yelling and screaming.  HENT: Normocephalic, Atraumatic, Bilateral external ears normal.  Eyes:  conjunctiva are normal.   Neck: Supple.  Non tender midline  Cardiovascular: Regular rate and rhythm without murmurs gallops or rubs.   Thorax & Lungs: Diminishes breath sounds, but clear to auscultation. No respiratory distress. Breathing comfortably. No wheezes no rales. Tenderness to palpation, Dialysis catheter to the right chest,  Abdomen: Tender, but soft. Bowel sounds present, non distended  Skin: Warm, Dry, No " erythema,   Back: Tenderness to palpation, No CVA tenderness.  Musculoskeletal: 2+ dp pulses that are equal in the lower extremities. No clubbing cyanosis or edema good range of motion, AV-fistula to the left arm  Neurologic: Alert & oriented x 3, normal sensation moving all extremities appears normal   Psychiatric: Affect normal, Judgment normal, Mood normal.     DIAGNOSTIC STUDIES / PROCEDURES  LABS  Results for orders placed or performed during the hospital encounter of 01/06/25   CBC with Differential    Collection Time: 01/06/25 12:47 PM   Result Value Ref Range    WBC 8.9 4.8 - 10.8 K/uL    RBC 3.57 (L) 4.20 - 5.40 M/uL    Hemoglobin 11.0 (L) 12.0 - 16.0 g/dL    Hematocrit 35.4 (L) 37.0 - 47.0 %    MCV 99.2 (H) 81.4 - 97.8 fL    MCH 30.8 27.0 - 33.0 pg    MCHC 31.1 (L) 32.2 - 35.5 g/dL    RDW 56.1 (H) 35.9 - 50.0 fL    Platelet Count 206 164 - 446 K/uL    MPV 10.3 9.0 - 12.9 fL    Neutrophils-Polys 73.90 (H) 44.00 - 72.00 %    Lymphocytes 12.60 (L) 22.00 - 41.00 %    Monocytes 8.80 0.00 - 13.40 %    Eosinophils 3.10 0.00 - 6.90 %    Basophils 1.30 0.00 - 1.80 %    Immature Granulocytes 0.30 0.00 - 0.90 %    Nucleated RBC 0.00 0.00 - 0.20 /100 WBC    Neutrophils (Absolute) 6.58 1.82 - 7.42 K/uL    Lymphs (Absolute) 1.12 1.00 - 4.80 K/uL    Monos (Absolute) 0.78 0.00 - 0.85 K/uL    Eos (Absolute) 0.28 0.00 - 0.51 K/uL    Baso (Absolute) 0.12 0.00 - 0.12 K/uL    Immature Granulocytes (abs) 0.03 0.00 - 0.11 K/uL    NRBC (Absolute) 0.00 K/uL   Complete Metabolic Panel (CMP)    Collection Time: 01/06/25 12:47 PM   Result Value Ref Range    Sodium 134 (L) 135 - 145 mmol/L    Potassium 5.3 3.6 - 5.5 mmol/L    Chloride 97 96 - 112 mmol/L    Co2 29 20 - 33 mmol/L    Anion Gap 8.0 7.0 - 16.0    Glucose 147 (H) 65 - 99 mg/dL    Bun 40 (H) 8 - 22 mg/dL    Creatinine 3.85 (H) 0.50 - 1.40 mg/dL    Calcium 8.2 (L) 8.5 - 10.5 mg/dL    Correct Calcium 8.8 8.5 - 10.5 mg/dL    AST(SGOT) 49 (H) 12 - 45 U/L    ALT(SGPT) 25 2 - 50  U/L    Alkaline Phosphatase 562 (H) 30 - 99 U/L    Total Bilirubin 0.6 0.1 - 1.5 mg/dL    Albumin 3.3 3.2 - 4.9 g/dL    Total Protein 7.6 6.0 - 8.2 g/dL    Globulin 4.3 (H) 1.9 - 3.5 g/dL    A-G Ratio 0.8 g/dL   proBrain Natriuretic Peptide, NT (BNP)    Collection Time: 01/06/25 12:47 PM   Result Value Ref Range    NT-proBNP 2495 (H) 0 - 125 pg/mL   Troponins NOW    Collection Time: 01/06/25 12:47 PM   Result Value Ref Range    Troponin T 72 (H) 6 - 19 ng/L   ESTIMATED GFR    Collection Time: 01/06/25 12:47 PM   Result Value Ref Range    GFR (CKD-EPI) 12 (A) >60 mL/min/1.73 m 2     *Note: Due to a large number of results and/or encounters for the requested time period, some results have not been displayed. A complete set of results can be found in Results Review.     EKG:   Interpreted by me as above.    RADIOLOGY  I have independently interpreted the diagnostic imaging associated with this visit and am waiting the final reading from the radiologist.   My preliminary interpretation is as follows: Chest x-ray shows no infiltrates.    Radiologist interpretation:   DX-CHEST-PORTABLE (1 VIEW)   Final Result      No acute cardiopulmonary abnormality.           COURSE & MEDICAL DECISION MAKING    ED COURSE:    ED Observation Status? No, The patient does not qualify for observation status     INTERVENTIONS BY ME:  Medications   lidocaine (Asperflex) 4 % patch 1 Patch (has no administration in time range)   HYDROmorphone (Dilaudid) injection 1 mg (1 mg Intravenous Given 1/6/25 1310)   ketamine (Ketalar) 25 mg in NS 50 mL (low dose pain infusion) (0 mg Intravenous Stopped 1/6/25 1450)       12:49 PM - Patient seen and examined at bedside. Discussed plan of care, including ordering labs, imaging and EKG to further evaluate. Patient agrees to the plan of care. The patient will be medicated with ketamine 25 mg in NS 50 mL and Dilaudid injection 1 mg. Ordered for DX-chest, CBC with diff, CMP, BNP, Troponins NOW, EKG to evaluate  her symptoms.     2:21 PM - Patient was reevaluated at bedside. Patient reports that she is still in pain. Will medicate her with Lidocaine 4% patch 1 patch before discharge. Discussed lab and radiology results with the patient. I discussed plan for discharge and follow up as outlined below. The patient is stable for discharge at this time and will return for any new or worsening symptoms. Patient verbalizes understanding and support with my plan for discharge.      INITIAL ASSESSMENT, COURSE AND PLAN  Care Narrative: Patient presents in significant discomfort.  The patient does have a longstanding history of chronic pain she was just recently here with extensive workup for this chest pain going straight to the back she had a CT angiogram that was completely normal CT scan of the thoracic spine that showed degenerative changes.  Today she has normal pulses distally so I do not feel that this is a dissection.  Patient was treated with multiple pain medications to include a ketamine infusion and 1 dose of hydromorphone.  Patient does have improvement with her pain control.  Her troponin is elevated at 72 but chronically its elevated in the 80s range.  At this point I do not feel that this is cardiogenic back pain I do feel that it is chronic back pain for this patient.  The patient was supposed to be set up with a pain specialist for continued outpatient pain control.  At this point I will place a lidocaine patch on the area.  I had a long discussion with the patient about pain control and at this point the patient will be discharged to follow-up as an outpatient.  I do not feel the patient requires admission at this time.  The patient does get chronic dialysis 3 days a week.          ADDITIONAL PROBLEM LIST  End-stage renal disease on dialysis    DISPOSITION AND DISCUSSIONS  I have discussed management of the patient with the following physicians and MILAN's: Respiratory    Escalation of care considered, and ultimately  not performed: None    Barriers to care at this time, including but not limited to: None.     Decision tools and prescription drugs considered including, but not limited to: As described above.       The patient will return for new or worsening symptoms and is stable at the time of discharge.    The patient is referred to a primary physician for blood pressure management, diabetic screening, and for all other preventative health concerns.    I reviewed prescription monitoring program for patient's narcotic use before prescribing a scheduled drug.The patient will not drink alcohol nor drive with prescribed medications      DISPOSITION:  Patient will be discharged home in stable condition.    FOLLOW UP:  ZOË Maxwell  [address]  [facility] [address]  [phone]    Schedule an appointment as soon as possible for a visit in 1 week  For re-check    PAIN MANAGEMENT [address] [address]  [phone]        Milan Bender M.D.  [address]  [facility] [id number] [phone]            OUTPATIENT MEDICATIONS:  New Prescriptions    LIDOCAINE (LIDODERM) 5 % PATCH    Place 1 Patch on the skin every 24 hours.        FINAL DIAGNOSIS  1. Chronic midline thoracic back pain    2. ESRD (end stage renal disease) on dialysis ([facility])         Crissy MCKEON (Susan), am scribing for, and in the presence of, Morgan Castellanos M.D..    Electronically signed by: Crissy Martin), 1/6/2025    IMorgan M.D. personally performed the services described in this documentation, as scribed by Crissy Castellanos in my presence, and it is both accurate and complete.     Electronically signed by: Morgan Castellanos M.D.,4:21 PM 01/06/25

## 2025-01-06 NOTE — ED TRIAGE NOTES
.  Chief Complaint   Patient presents with    Back Pain     Mid scapular pain, sharp in nature. Since last night.       Pt BIB EMS from home for above complaint. Pt missed dialysis today. Pt reports sharp 10/10 pain between her shoulder blades. EMS gave 100 mcg Fentanyl IN and 50 mcg Fentanyl IV PTA. Pt c/o pain upon movement but rests comfortably between moving.

## 2025-01-07 ENCOUNTER — HOSPITAL ENCOUNTER (OUTPATIENT)
Facility: MEDICAL CENTER | Age: 70
End: 2025-01-08
Attending: EMERGENCY MEDICINE
Payer: MEDICARE

## 2025-01-07 DIAGNOSIS — R73.9 HYPERGLYCEMIA: ICD-10-CM

## 2025-01-07 DIAGNOSIS — R52 INTRACTABLE PAIN: ICD-10-CM

## 2025-01-07 DIAGNOSIS — N18.6 END STAGE RENAL DISEASE (HCC): ICD-10-CM

## 2025-01-07 DIAGNOSIS — E10.65 TYPE 1 DIABETES MELLITUS WITH HYPERGLYCEMIA (HCC): ICD-10-CM

## 2025-01-07 PROCEDURE — 99285 EMERGENCY DEPT VISIT HI MDM: CPT

## 2025-01-07 ASSESSMENT — PAIN DESCRIPTION - PAIN TYPE: TYPE: ACUTE PAIN

## 2025-01-07 ASSESSMENT — FIBROSIS 4 INDEX: FIB4 SCORE: 3.19

## 2025-01-07 NOTE — ED NOTES
Discharge instructions reviewed with patient and signed. IV was removed from arm. They were instructed on how to  prescriptions. They verbalized understanding of follow up instructions. All belongings with patient. They were assisted to lobby via WC awaiting ride from family home.

## 2025-01-08 ENCOUNTER — APPOINTMENT (OUTPATIENT)
Dept: RADIOLOGY | Facility: MEDICAL CENTER | Age: 70
End: 2025-01-08
Attending: EMERGENCY MEDICINE
Payer: MEDICARE

## 2025-01-08 ENCOUNTER — PHARMACY VISIT (OUTPATIENT)
Dept: PHARMACY | Facility: MEDICAL CENTER | Age: 70
End: 2025-01-08
Payer: COMMERCIAL

## 2025-01-08 VITALS
WEIGHT: 120 LBS | RESPIRATION RATE: 17 BRPM | BODY MASS INDEX: 19.29 KG/M2 | OXYGEN SATURATION: 97 % | DIASTOLIC BLOOD PRESSURE: 59 MMHG | TEMPERATURE: 96.9 F | HEIGHT: 66 IN | HEART RATE: 61 BPM | SYSTOLIC BLOOD PRESSURE: 108 MMHG

## 2025-01-08 PROBLEM — E87.5 HYPERKALEMIA: Status: RESOLVED | Noted: 2024-12-31 | Resolved: 2025-01-08

## 2025-01-08 PROBLEM — R52 INTRACTABLE PAIN: Status: RESOLVED | Noted: 2025-01-08 | Resolved: 2025-01-08

## 2025-01-08 PROBLEM — R52 INTRACTABLE PAIN: Status: ACTIVE | Noted: 2025-01-08

## 2025-01-08 PROBLEM — K75.81 STEATOHEPATITIS: Status: ACTIVE | Noted: 2024-12-31

## 2025-01-08 LAB
ALBUMIN SERPL BCP-MCNC: 3 G/DL (ref 3.2–4.9)
ALBUMIN/GLOB SERPL: 0.8 G/DL
ALP SERPL-CCNC: 630 U/L (ref 30–99)
ALT SERPL-CCNC: 24 U/L (ref 2–50)
ANION GAP SERPL CALC-SCNC: 11 MMOL/L (ref 7–16)
AST SERPL-CCNC: 37 U/L (ref 12–45)
B-OH-BUTYR SERPL-MCNC: 0.85 MMOL/L (ref 0.02–0.27)
BASOPHILS # BLD AUTO: 1.4 % (ref 0–1.8)
BASOPHILS # BLD: 0.13 K/UL (ref 0–0.12)
BILIRUB SERPL-MCNC: 0.5 MG/DL (ref 0.1–1.5)
BUN SERPL-MCNC: 26 MG/DL (ref 8–22)
CALCIUM ALBUM COR SERPL-MCNC: 8.6 MG/DL (ref 8.5–10.5)
CALCIUM SERPL-MCNC: 7.8 MG/DL (ref 8.5–10.5)
CHLORIDE SERPL-SCNC: 94 MMOL/L (ref 96–112)
CO2 SERPL-SCNC: 26 MMOL/L (ref 20–33)
CREAT SERPL-MCNC: 2.59 MG/DL (ref 0.5–1.4)
CRP SERPL HS-MCNC: 3.92 MG/DL (ref 0–0.75)
EOSINOPHIL # BLD AUTO: 0.14 K/UL (ref 0–0.51)
EOSINOPHIL NFR BLD: 1.5 % (ref 0–6.9)
ERYTHROCYTE [DISTWIDTH] IN BLOOD BY AUTOMATED COUNT: 53.5 FL (ref 35.9–50)
ERYTHROCYTE [SEDIMENTATION RATE] IN BLOOD BY WESTERGREN METHOD: 102 MM/HOUR (ref 0–25)
GFR SERPLBLD CREATININE-BSD FMLA CKD-EPI: 20 ML/MIN/1.73 M 2
GLOBULIN SER CALC-MCNC: 4 G/DL (ref 1.9–3.5)
GLUCOSE BLD STRIP.AUTO-MCNC: 126 MG/DL (ref 65–99)
GLUCOSE BLD STRIP.AUTO-MCNC: 156 MG/DL (ref 65–99)
GLUCOSE BLD STRIP.AUTO-MCNC: 158 MG/DL (ref 65–99)
GLUCOSE BLD STRIP.AUTO-MCNC: 325 MG/DL (ref 65–99)
GLUCOSE BLD STRIP.AUTO-MCNC: 400 MG/DL (ref 65–99)
GLUCOSE BLD STRIP.AUTO-MCNC: 426 MG/DL (ref 65–99)
GLUCOSE BLD STRIP.AUTO-MCNC: 459 MG/DL (ref 65–99)
GLUCOSE BLD STRIP.AUTO-MCNC: 488 MG/DL (ref 65–99)
GLUCOSE BLD STRIP.AUTO-MCNC: 514 MG/DL (ref 65–99)
GLUCOSE BLD STRIP.AUTO-MCNC: 68 MG/DL (ref 65–99)
GLUCOSE SERPL-MCNC: 571 MG/DL (ref 65–99)
HCT VFR BLD AUTO: 33.3 % (ref 37–47)
HGB BLD-MCNC: 10.5 G/DL (ref 12–16)
IMM GRANULOCYTES # BLD AUTO: 0.04 K/UL (ref 0–0.11)
IMM GRANULOCYTES NFR BLD AUTO: 0.4 % (ref 0–0.9)
LYMPHOCYTES # BLD AUTO: 1.19 K/UL (ref 1–4.8)
LYMPHOCYTES NFR BLD: 12.9 % (ref 22–41)
MCH RBC QN AUTO: 30.7 PG (ref 27–33)
MCHC RBC AUTO-ENTMCNC: 31.5 G/DL (ref 32.2–35.5)
MCV RBC AUTO: 97.4 FL (ref 81.4–97.8)
MONOCYTES # BLD AUTO: 0.92 K/UL (ref 0–0.85)
MONOCYTES NFR BLD AUTO: 10 % (ref 0–13.4)
NEUTROPHILS # BLD AUTO: 6.77 K/UL (ref 1.82–7.42)
NEUTROPHILS NFR BLD: 73.8 % (ref 44–72)
NRBC # BLD AUTO: 0 K/UL
NRBC BLD-RTO: 0 /100 WBC (ref 0–0.2)
PLATELET # BLD AUTO: 202 K/UL (ref 164–446)
PMV BLD AUTO: 10.4 FL (ref 9–12.9)
POTASSIUM SERPL-SCNC: 5.7 MMOL/L (ref 3.6–5.5)
PROT SERPL-MCNC: 7 G/DL (ref 6–8.2)
RBC # BLD AUTO: 3.42 M/UL (ref 4.2–5.4)
SODIUM SERPL-SCNC: 131 MMOL/L (ref 135–145)
WBC # BLD AUTO: 9.2 K/UL (ref 4.8–10.8)

## 2025-01-08 PROCEDURE — 700105 HCHG RX REV CODE 258: Performed by: EMERGENCY MEDICINE

## 2025-01-08 PROCEDURE — 700111 HCHG RX REV CODE 636 W/ 250 OVERRIDE (IP)

## 2025-01-08 PROCEDURE — 96376 TX/PRO/DX INJ SAME DRUG ADON: CPT

## 2025-01-08 PROCEDURE — 87040 BLOOD CULTURE FOR BACTERIA: CPT | Mod: 91

## 2025-01-08 PROCEDURE — 700117 HCHG RX CONTRAST REV CODE 255: Mod: JZ | Performed by: EMERGENCY MEDICINE

## 2025-01-08 PROCEDURE — RXMED WILLOW AMBULATORY MEDICATION CHARGE: Performed by: STUDENT IN AN ORGANIZED HEALTH CARE EDUCATION/TRAINING PROGRAM

## 2025-01-08 PROCEDURE — 700111 HCHG RX REV CODE 636 W/ 250 OVERRIDE (IP): Mod: JZ,TB | Performed by: EMERGENCY MEDICINE

## 2025-01-08 PROCEDURE — 700101 HCHG RX REV CODE 250: Performed by: EMERGENCY MEDICINE

## 2025-01-08 PROCEDURE — 99235 HOSP IP/OBS SAME DATE MOD 70: CPT

## 2025-01-08 PROCEDURE — 700102 HCHG RX REV CODE 250 W/ 637 OVERRIDE(OP): Performed by: STUDENT IN AN ORGANIZED HEALTH CARE EDUCATION/TRAINING PROGRAM

## 2025-01-08 PROCEDURE — 700102 HCHG RX REV CODE 250 W/ 637 OVERRIDE(OP)

## 2025-01-08 PROCEDURE — 85652 RBC SED RATE AUTOMATED: CPT

## 2025-01-08 PROCEDURE — 96372 THER/PROPH/DIAG INJ SC/IM: CPT

## 2025-01-08 PROCEDURE — 36415 COLL VENOUS BLD VENIPUNCTURE: CPT

## 2025-01-08 PROCEDURE — A9270 NON-COVERED ITEM OR SERVICE: HCPCS | Performed by: STUDENT IN AN ORGANIZED HEALTH CARE EDUCATION/TRAINING PROGRAM

## 2025-01-08 PROCEDURE — 86140 C-REACTIVE PROTEIN: CPT

## 2025-01-08 PROCEDURE — A9270 NON-COVERED ITEM OR SERVICE: HCPCS

## 2025-01-08 PROCEDURE — A9579 GAD-BASE MR CONTRAST NOS,1ML: HCPCS | Mod: JZ | Performed by: EMERGENCY MEDICINE

## 2025-01-08 PROCEDURE — 82010 KETONE BODYS QUAN: CPT

## 2025-01-08 PROCEDURE — 700111 HCHG RX REV CODE 636 W/ 250 OVERRIDE (IP): Performed by: INTERNAL MEDICINE

## 2025-01-08 PROCEDURE — 72157 MRI CHEST SPINE W/O & W/DYE: CPT

## 2025-01-08 PROCEDURE — 700101 HCHG RX REV CODE 250

## 2025-01-08 PROCEDURE — 96365 THER/PROPH/DIAG IV INF INIT: CPT

## 2025-01-08 PROCEDURE — 96375 TX/PRO/DX INJ NEW DRUG ADDON: CPT

## 2025-01-08 PROCEDURE — 82962 GLUCOSE BLOOD TEST: CPT | Mod: 91

## 2025-01-08 PROCEDURE — G0378 HOSPITAL OBSERVATION PER HR: HCPCS

## 2025-01-08 PROCEDURE — 99285 EMERGENCY DEPT VISIT HI MDM: CPT

## 2025-01-08 PROCEDURE — 72156 MRI NECK SPINE W/O & W/DYE: CPT

## 2025-01-08 PROCEDURE — 94760 N-INVAS EAR/PLS OXIMETRY 1: CPT

## 2025-01-08 PROCEDURE — 85025 COMPLETE CBC W/AUTO DIFF WBC: CPT

## 2025-01-08 PROCEDURE — 80053 COMPREHEN METABOLIC PANEL: CPT

## 2025-01-08 PROCEDURE — 90935 HEMODIALYSIS ONE EVALUATION: CPT

## 2025-01-08 RX ORDER — ONDANSETRON 2 MG/ML
4 INJECTION INTRAMUSCULAR; INTRAVENOUS EVERY 4 HOURS PRN
Status: DISCONTINUED | OUTPATIENT
Start: 2025-01-08 | End: 2025-01-08 | Stop reason: HOSPADM

## 2025-01-08 RX ORDER — OXYCODONE HYDROCHLORIDE 10 MG/1
10 TABLET ORAL
Status: DISCONTINUED | OUTPATIENT
Start: 2025-01-08 | End: 2025-01-08 | Stop reason: HOSPADM

## 2025-01-08 RX ORDER — AMOXICILLIN 250 MG
2 CAPSULE ORAL EVERY EVENING
Status: DISCONTINUED | OUTPATIENT
Start: 2025-01-08 | End: 2025-01-08 | Stop reason: HOSPADM

## 2025-01-08 RX ORDER — CYCLOBENZAPRINE HCL 10 MG
10 TABLET ORAL 3 TIMES DAILY PRN
Status: DISCONTINUED | OUTPATIENT
Start: 2025-01-08 | End: 2025-01-08

## 2025-01-08 RX ORDER — HYDROMORPHONE HYDROCHLORIDE 1 MG/ML
1 INJECTION, SOLUTION INTRAMUSCULAR; INTRAVENOUS; SUBCUTANEOUS ONCE
Status: COMPLETED | OUTPATIENT
Start: 2025-01-08 | End: 2025-01-08

## 2025-01-08 RX ORDER — OXYCODONE HYDROCHLORIDE 5 MG/1
5 TABLET ORAL
Status: DISCONTINUED | OUTPATIENT
Start: 2025-01-08 | End: 2025-01-08 | Stop reason: HOSPADM

## 2025-01-08 RX ORDER — DULOXETIN HYDROCHLORIDE 30 MG/1
30 CAPSULE, DELAYED RELEASE ORAL DAILY
Status: DISCONTINUED | OUTPATIENT
Start: 2025-01-08 | End: 2025-01-08 | Stop reason: HOSPADM

## 2025-01-08 RX ORDER — SEVELAMER CARBONATE 800 MG/1
800 TABLET, FILM COATED ORAL
Status: DISCONTINUED | OUTPATIENT
Start: 2025-01-08 | End: 2025-01-08 | Stop reason: HOSPADM

## 2025-01-08 RX ORDER — ACETAMINOPHEN 500 MG
1000 TABLET ORAL 3 TIMES DAILY
Status: DISCONTINUED | OUTPATIENT
Start: 2025-01-08 | End: 2025-01-08 | Stop reason: HOSPADM

## 2025-01-08 RX ORDER — ACETAMINOPHEN 325 MG/1
650 TABLET ORAL EVERY 6 HOURS PRN
Status: DISCONTINUED | OUTPATIENT
Start: 2025-01-08 | End: 2025-01-08

## 2025-01-08 RX ORDER — HEPARIN SODIUM 1000 [USP'U]/ML
INJECTION, SOLUTION INTRAVENOUS; SUBCUTANEOUS
Status: COMPLETED
Start: 2025-01-08 | End: 2025-01-08

## 2025-01-08 RX ORDER — URSODIOL 300 MG/1
300 CAPSULE ORAL 3 TIMES DAILY
Status: DISCONTINUED | OUTPATIENT
Start: 2025-01-08 | End: 2025-01-08 | Stop reason: HOSPADM

## 2025-01-08 RX ORDER — INSULIN GLARGINE 100 [IU]/ML
5 INJECTION, SOLUTION SUBCUTANEOUS EVERY EVENING
Qty: 3 ML | Refills: 0 | Status: ON HOLD | OUTPATIENT
Start: 2025-01-08

## 2025-01-08 RX ORDER — LEVOTHYROXINE SODIUM 125 UG/1
250 TABLET ORAL
Status: DISCONTINUED | OUTPATIENT
Start: 2025-01-08 | End: 2025-01-08 | Stop reason: HOSPADM

## 2025-01-08 RX ORDER — INSULIN LISPRO 100 [IU]/ML
2-9 INJECTION, SOLUTION INTRAVENOUS; SUBCUTANEOUS
Status: DISCONTINUED | OUTPATIENT
Start: 2025-01-08 | End: 2025-01-08 | Stop reason: HOSPADM

## 2025-01-08 RX ORDER — VENLAFAXINE HYDROCHLORIDE 75 MG/1
150 CAPSULE, EXTENDED RELEASE ORAL DAILY
Status: DISCONTINUED | OUTPATIENT
Start: 2025-01-08 | End: 2025-01-08 | Stop reason: HOSPADM

## 2025-01-08 RX ORDER — ATORVASTATIN CALCIUM 40 MG/1
40 TABLET, FILM COATED ORAL EVERY EVENING
Status: DISCONTINUED | OUTPATIENT
Start: 2025-01-08 | End: 2025-01-08 | Stop reason: HOSPADM

## 2025-01-08 RX ORDER — SODIUM CHLORIDE 9 MG/ML
100 INJECTION, SOLUTION INTRAVENOUS
Status: DISCONTINUED | OUTPATIENT
Start: 2025-01-08 | End: 2025-01-08 | Stop reason: HOSPADM

## 2025-01-08 RX ORDER — ACETAMINOPHEN 500 MG
1000 TABLET ORAL 3 TIMES DAILY
COMMUNITY
Start: 2025-01-08 | End: 2025-01-22

## 2025-01-08 RX ORDER — GABAPENTIN 100 MG/1
100 CAPSULE ORAL
Status: DISCONTINUED | OUTPATIENT
Start: 2025-01-08 | End: 2025-01-08 | Stop reason: HOSPADM

## 2025-01-08 RX ORDER — LORAZEPAM 2 MG/ML
1 INJECTION INTRAMUSCULAR
Status: DISCONTINUED | OUTPATIENT
Start: 2025-01-08 | End: 2025-01-08

## 2025-01-08 RX ORDER — ACETAMINOPHEN 500 MG
1000 TABLET ORAL 3 TIMES DAILY PRN
Status: DISCONTINUED | OUTPATIENT
Start: 2025-01-13 | End: 2025-01-08 | Stop reason: HOSPADM

## 2025-01-08 RX ORDER — ONDANSETRON 4 MG/1
4 TABLET, ORALLY DISINTEGRATING ORAL EVERY 4 HOURS PRN
Status: DISCONTINUED | OUTPATIENT
Start: 2025-01-08 | End: 2025-01-08 | Stop reason: HOSPADM

## 2025-01-08 RX ORDER — LIDOCAINE 4 G/G
1 PATCH TOPICAL EVERY 24 HOURS
Status: DISCONTINUED | OUTPATIENT
Start: 2025-01-08 | End: 2025-01-08 | Stop reason: HOSPADM

## 2025-01-08 RX ORDER — METOPROLOL SUCCINATE 100 MG/1
100 TABLET, EXTENDED RELEASE ORAL DAILY
Status: DISCONTINUED | OUTPATIENT
Start: 2025-01-08 | End: 2025-01-08 | Stop reason: HOSPADM

## 2025-01-08 RX ORDER — LIOTHYRONINE SODIUM 5 UG/1
5 TABLET ORAL DAILY
Status: DISCONTINUED | OUTPATIENT
Start: 2025-01-08 | End: 2025-01-08 | Stop reason: HOSPADM

## 2025-01-08 RX ORDER — METHOCARBAMOL 750 MG/1
750 TABLET, FILM COATED ORAL 4 TIMES DAILY PRN
Qty: 120 TABLET | Refills: 0 | Status: ON HOLD | OUTPATIENT
Start: 2025-01-08

## 2025-01-08 RX ORDER — DULOXETIN HYDROCHLORIDE 30 MG/1
30 CAPSULE, DELAYED RELEASE ORAL DAILY
Qty: 30 CAPSULE | Refills: 0 | Status: ON HOLD | OUTPATIENT
Start: 2025-01-08

## 2025-01-08 RX ORDER — HYDROMORPHONE HYDROCHLORIDE 1 MG/ML
0.5 INJECTION, SOLUTION INTRAMUSCULAR; INTRAVENOUS; SUBCUTANEOUS
Status: DISCONTINUED | OUTPATIENT
Start: 2025-01-08 | End: 2025-01-08 | Stop reason: HOSPADM

## 2025-01-08 RX ORDER — AMLODIPINE BESYLATE 10 MG/1
10 TABLET ORAL DAILY
Status: DISCONTINUED | OUTPATIENT
Start: 2025-01-08 | End: 2025-01-08 | Stop reason: HOSPADM

## 2025-01-08 RX ORDER — POLYETHYLENE GLYCOL 3350 17 G/17G
1 POWDER, FOR SOLUTION ORAL
Status: DISCONTINUED | OUTPATIENT
Start: 2025-01-08 | End: 2025-01-08 | Stop reason: HOSPADM

## 2025-01-08 RX ORDER — HEPARIN SODIUM 1000 [USP'U]/ML
1800 INJECTION, SOLUTION INTRAVENOUS; SUBCUTANEOUS
Status: DISCONTINUED | OUTPATIENT
Start: 2025-01-08 | End: 2025-01-08 | Stop reason: HOSPADM

## 2025-01-08 RX ORDER — DEXTROSE MONOHYDRATE 25 G/50ML
25 INJECTION, SOLUTION INTRAVENOUS
Status: DISCONTINUED | OUTPATIENT
Start: 2025-01-08 | End: 2025-01-08 | Stop reason: HOSPADM

## 2025-01-08 RX ORDER — INSULIN LISPRO 100 [IU]/ML
0.2 INJECTION, SOLUTION INTRAVENOUS; SUBCUTANEOUS
Status: DISCONTINUED | OUTPATIENT
Start: 2025-01-08 | End: 2025-01-08 | Stop reason: HOSPADM

## 2025-01-08 RX ORDER — DEXAMETHASONE 4 MG/1
4 TABLET ORAL 2 TIMES DAILY
Status: DISCONTINUED | OUTPATIENT
Start: 2025-01-08 | End: 2025-01-08 | Stop reason: HOSPADM

## 2025-01-08 RX ORDER — DEXAMETHASONE 4 MG/1
4 TABLET ORAL 2 TIMES DAILY
Qty: 12 TABLET | Refills: 0 | Status: SHIPPED | OUTPATIENT
Start: 2025-01-09 | End: 2025-01-15

## 2025-01-08 RX ORDER — HEPARIN SODIUM 5000 [USP'U]/ML
5000 INJECTION, SOLUTION INTRAVENOUS; SUBCUTANEOUS EVERY 12 HOURS
Status: DISCONTINUED | OUTPATIENT
Start: 2025-01-08 | End: 2025-01-08 | Stop reason: HOSPADM

## 2025-01-08 RX ORDER — METHOCARBAMOL 750 MG/1
750 TABLET, FILM COATED ORAL 4 TIMES DAILY
Status: DISCONTINUED | OUTPATIENT
Start: 2025-01-08 | End: 2025-01-08 | Stop reason: HOSPADM

## 2025-01-08 RX ORDER — FAMOTIDINE 20 MG/1
20 TABLET, FILM COATED ORAL DAILY
Status: DISCONTINUED | OUTPATIENT
Start: 2025-01-08 | End: 2025-01-08 | Stop reason: HOSPADM

## 2025-01-08 RX ORDER — PANTOPRAZOLE SODIUM 40 MG/1
40 TABLET, DELAYED RELEASE ORAL DAILY
Status: DISCONTINUED | OUTPATIENT
Start: 2025-01-08 | End: 2025-01-08

## 2025-01-08 RX ORDER — TRAZODONE HYDROCHLORIDE 150 MG/1
150 TABLET ORAL
Status: DISCONTINUED | OUTPATIENT
Start: 2025-01-08 | End: 2025-01-08 | Stop reason: HOSPADM

## 2025-01-08 RX ORDER — ENOXAPARIN SODIUM 100 MG/ML
30 INJECTION SUBCUTANEOUS DAILY
Status: DISCONTINUED | OUTPATIENT
Start: 2025-01-08 | End: 2025-01-08

## 2025-01-08 RX ADMIN — HEPARIN SODIUM 1800 UNITS: 1000 INJECTION, SOLUTION INTRAVENOUS; SUBCUTANEOUS at 16:15

## 2025-01-08 RX ADMIN — ATORVASTATIN CALCIUM 40 MG: 40 TABLET, FILM COATED ORAL at 18:10

## 2025-01-08 RX ADMIN — URSODIOL 300 MG: 300 CAPSULE ORAL at 18:10

## 2025-01-08 RX ADMIN — SEVELAMER CARBONATE 800 MG: 800 TABLET, FILM COATED ORAL at 11:49

## 2025-01-08 RX ADMIN — HYDROMORPHONE HYDROCHLORIDE 1 MG: 1 INJECTION, SOLUTION INTRAMUSCULAR; INTRAVENOUS; SUBCUTANEOUS at 05:23

## 2025-01-08 RX ADMIN — ACETAMINOPHEN 1000 MG: 500 TABLET ORAL at 18:09

## 2025-01-08 RX ADMIN — INSULIN LISPRO 9 UNITS: 100 INJECTION, SOLUTION INTRAVENOUS; SUBCUTANEOUS at 11:51

## 2025-01-08 RX ADMIN — OMEPRAZOLE 20 MG: 20 CAPSULE, DELAYED RELEASE ORAL at 10:11

## 2025-01-08 RX ADMIN — GADOTERIDOL 10 ML: 279.3 INJECTION, SOLUTION INTRAVENOUS at 09:52

## 2025-01-08 RX ADMIN — METHOCARBAMOL 750 MG: 750 TABLET ORAL at 11:50

## 2025-01-08 RX ADMIN — KETAMINE HYDROCHLORIDE 25 MG: 10 INJECTION INTRAMUSCULAR; INTRAVENOUS at 01:27

## 2025-01-08 RX ADMIN — INSULIN HUMAN 5 UNITS: 100 INJECTION, SOLUTION PARENTERAL at 04:38

## 2025-01-08 RX ADMIN — INSULIN LISPRO 4 UNITS: 100 INJECTION, SOLUTION INTRAVENOUS; SUBCUTANEOUS at 11:52

## 2025-01-08 RX ADMIN — HEPARIN SODIUM 5000 UNITS: 5000 INJECTION, SOLUTION INTRAVENOUS; SUBCUTANEOUS at 18:09

## 2025-01-08 RX ADMIN — VENLAFAXINE HYDROCHLORIDE 150 MG: 75 CAPSULE, EXTENDED RELEASE ORAL at 10:11

## 2025-01-08 RX ADMIN — INSULIN LISPRO 8 UNITS: 100 INJECTION, SOLUTION INTRAVENOUS; SUBCUTANEOUS at 09:58

## 2025-01-08 RX ADMIN — GABAPENTIN 100 MG: 100 CAPSULE ORAL at 10:11

## 2025-01-08 RX ADMIN — HYDROMORPHONE HYDROCHLORIDE 1 MG: 1 INJECTION, SOLUTION INTRAMUSCULAR; INTRAVENOUS; SUBCUTANEOUS at 02:55

## 2025-01-08 RX ADMIN — INSULIN LISPRO 4 UNITS: 100 INJECTION, SOLUTION INTRAVENOUS; SUBCUTANEOUS at 18:34

## 2025-01-08 RX ADMIN — LEVOTHYROXINE SODIUM 250 MCG: 0.12 TABLET ORAL at 10:12

## 2025-01-08 RX ADMIN — SENNOSIDES AND DOCUSATE SODIUM 2 TABLET: 50; 8.6 TABLET ORAL at 18:09

## 2025-01-08 RX ADMIN — INSULIN GLARGINE-YFGN 15 UNITS: 100 INJECTION, SOLUTION SUBCUTANEOUS at 18:29

## 2025-01-08 RX ADMIN — LIOTHYRONINE SODIUM 5 MCG: 5 TABLET ORAL at 10:12

## 2025-01-08 RX ADMIN — SEVELAMER CARBONATE 800 MG: 800 TABLET, FILM COATED ORAL at 18:09

## 2025-01-08 RX ADMIN — METHOCARBAMOL 750 MG: 750 TABLET ORAL at 18:09

## 2025-01-08 RX ADMIN — GABAPENTIN 100 MG: 100 CAPSULE ORAL at 11:49

## 2025-01-08 RX ADMIN — DEXAMETHASONE 4 MG: 4 TABLET ORAL at 18:10

## 2025-01-08 RX ADMIN — OXYCODONE HYDROCHLORIDE 10 MG: 10 TABLET ORAL at 13:36

## 2025-01-08 RX ADMIN — DEXAMETHASONE 4 MG: 4 TABLET ORAL at 10:58

## 2025-01-08 RX ADMIN — URSODIOL 300 MG: 300 CAPSULE ORAL at 11:49

## 2025-01-08 RX ADMIN — LIDOCAINE 1 PATCH: 4 PATCH TOPICAL at 10:12

## 2025-01-08 RX ADMIN — INSULIN LISPRO 4 UNITS: 100 INJECTION, SOLUTION INTRAVENOUS; SUBCUTANEOUS at 10:00

## 2025-01-08 RX ADMIN — AMLODIPINE BESYLATE 10 MG: 10 TABLET ORAL at 10:11

## 2025-01-08 RX ADMIN — OXYCODONE HYDROCHLORIDE 10 MG: 10 TABLET ORAL at 09:41

## 2025-01-08 RX ADMIN — FAMOTIDINE 20 MG: 20 TABLET, FILM COATED ORAL at 10:11

## 2025-01-08 RX ADMIN — DULOXETINE HYDROCHLORIDE 30 MG: 30 CAPSULE, DELAYED RELEASE ORAL at 18:09

## 2025-01-08 RX ADMIN — ACETAMINOPHEN 1000 MG: 500 TABLET ORAL at 10:59

## 2025-01-08 RX ADMIN — INSULIN HUMAN 5 UNITS: 100 INJECTION, SOLUTION PARENTERAL at 02:18

## 2025-01-08 RX ADMIN — METOPROLOL SUCCINATE 100 MG: 100 TABLET, EXTENDED RELEASE ORAL at 10:11

## 2025-01-08 ASSESSMENT — COPD QUESTIONNAIRES
DURING THE PAST 4 WEEKS HOW MUCH DID YOU FEEL SHORT OF BREATH: NONE/LITTLE OF THE TIME
IN THE PAST 12 MONTHS DO YOU DO LESS THAN YOU USED TO BECAUSE OF YOUR BREATHING PROBLEMS: DISAGREE/UNSURE
HAVE YOU SMOKED AT LEAST 100 CIGARETTES IN YOUR ENTIRE LIFE: YES
DO YOU EVER COUGH UP ANY MUCUS OR PHLEGM?: NO/ONLY WITH OCCASIONAL COLDS OR INFECTIONS
COPD SCREENING SCORE: 4

## 2025-01-08 ASSESSMENT — ENCOUNTER SYMPTOMS
SENSORY CHANGE: 0
LOSS OF CONSCIOUSNESS: 0
MYALGIAS: 0
FEVER: 0
CHILLS: 0
FOCAL WEAKNESS: 0
BLURRED VISION: 0
COUGH: 0
DIZZINESS: 0
ABDOMINAL PAIN: 0
SHORTNESS OF BREATH: 0
BACK PAIN: 1

## 2025-01-08 ASSESSMENT — PAIN DESCRIPTION - PAIN TYPE
TYPE: ACUTE PAIN

## 2025-01-08 ASSESSMENT — SOCIAL DETERMINANTS OF HEALTH (SDOH)
WITHIN THE LAST YEAR, HAVE YOU BEEN KICKED, HIT, SLAPPED, OR OTHERWISE PHYSICALLY HURT BY YOUR PARTNER OR EX-PARTNER?: NO
WITHIN THE LAST YEAR, HAVE YOU BEEN AFRAID OF YOUR PARTNER OR EX-PARTNER?: NO
WITHIN THE LAST YEAR, HAVE YOU BEEN HUMILIATED OR EMOTIONALLY ABUSED IN OTHER WAYS BY YOUR PARTNER OR EX-PARTNER?: NO
WITHIN THE LAST YEAR, HAVE TO BEEN RAPED OR FORCED TO HAVE ANY KIND OF SEXUAL ACTIVITY BY YOUR PARTNER OR EX-PARTNER?: NO

## 2025-01-08 NOTE — ASSESSMENT & PLAN NOTE
Monday Wednesday Friday dialysis, follows with [facility]  - Will need consult in a.m. for continued dialysis  - Monitor potassium  - Continue home sevelamer   [phone]

## 2025-01-08 NOTE — DISCHARGE PLANNING
HTH/SCP TCN chart review completed.The most current review of medical record, knowledge of pt's PLOF and social support, LACE+ score of 78, no 6 clicks available.    Pt seen at bedside. Introduced TCN program. Provided education regarding post acute levels of care. Education provided regarding case management policy for blanket SNF referrals. Discussed HTH/SCP plan benefits. Pt verbalizes understanding.     Pt reports she is anticipating that she will remain functionally capable of dc to home once medically cleared. Note that pt was on service with [facility] PTA. As pt is currently observation status, she does NOT need HH choice or HH order at this time. However if pt rolls to inpatient status, pt may need HH choice/order if remains in acute setting >72hrs.  TCN will monitor for status. Pt reports no concerns with transportation to home once medically cleared.    Given aforementioned, no additional provider requests at this time and no choice indicated if pt remains OBS status as will resume [facility] services at time of dc (see above).     Current discharge considerations are anticipated for resumption of [facility] services at time of dc. Note that this dc is highly dependent on continued observation status and response to medical POC/interventions prior to dc. TCN will continue to follow and collaborate with discharge planning team as additional post acute needs arise. Thank you.     Completed today:  Choice obtained: none indicated currently; pt OBS status and will resume services with [facility] at this time  Pt aware of [facility]'s blanket referral policy  SCP with non [facility] PCP

## 2025-01-08 NOTE — DISCHARGE SUMMARY
Discharge Summary    CHIEF COMPLAINT ON ADMISSION  Chief Complaint   Patient presents with    Back Pain     Pt states for a long time she has had pain between her scapula, has been seen for it. States pain is back and worse tonight.        Reason for Admission  Intractable Pain    Admission Date  1/7/2025    CODE STATUS  Full Code    HPI & HOSPITAL COURSE  This is a 69 y.o. female with ESRD MWF HD, hypothyroidism, T1DM, anxiety here with intractable pain.    She was hospitalized 12/31/24-1/3/25 at [facility] for acute-on-chronic back pain after prior evaluations with negative CTA and negative CT C-spine. She was discharged with referral to Palliative Care for advanced pain management. She was seen in the ED 1/6/24 for mid-scapular pain which improved with IV dilaudid and IV ketamine. She returned to ED due to recurrence of pain.    On presentation she was slightly hypertensive 157/88 in visible pain.  She became somnolent after administration of IV dilaudid and IV ketamine. She was admitted for recurrent intractable pain. ESR CRP were elevated and due to reported myalgias and arthralgias she was initiated on a course of dexamethasone. She was also initiated on robaxin adjunct for pain. MRI T-spine demontrated degenerative changes T9-T10. MRI C-spine demonstrated C4-C7 posterior fusion with abnormal signal enhancement C6-C7 previously seen on CT but unable to exluce infectious process. In absence of SIRS she underwent blood cultures but no indication for antibiotics due to low suspicion for osteomyelitis. Her inter-shoulder examination was unsuccessful in reproducing her pain. She had no midline spinous tenderness. Her shoulder ROM and strength were completely intact. She had bouts of writhing and screaming which responded completely to redirection, reassurance, and reminder that she was disrupting the healing of other patiens.    PDMP reviewed. She had a prescribed for 15 mg oxycodone x120 monthly which has ended. I  personally discussed initiation of SNRI for mood or fibromyalgia component of pain, adjunct robaxin, and steroid course. Due to uncontrolled T1DM she was initiated on physiologic 5 units glargine to reduce risk of DKA from steroid-induced hyperglycemia. She demonstrated poor insight into management of hyperglycemia nor description of modifiers nor quality of her pain. She was not prescribed new oxycodone due to risk of opiates for chronic pain and due to recent 1-week prescription from discharging physician 1/3/25 which has run out prior to intended 7 days. She was able to ambulate >200 ft unassisted with FWW without pain limitation prior to discharge.    Nephrology was consulted during hospitalization and ordered her routine Wednesday hemodialysis.    Therefore, she is discharged in fair and stable condition to home with close outpatient follow-up.    The patient met 2-midnight criteria for an inpatient stay at the time of discharge.    Discharge Date  1/8/2025    FOLLOW UP ITEMS POST DISCHARGE    Chronic pain flare - adjuncts, avoid opiates due to poor long-term efficacy and risk profile, follow up with palliative care for further pain management and ACP    DISCHARGE DIAGNOSES  Principal Problem (Resolved):    Intractable pain (POA: Yes)  Active Problems:    Type 1 diabetes mellitus with hyperglycemia (HCC) (POA: Yes)      Overview: ICD-10 transition    ESRD needing dialysis (HCC) (POA: Yes)    Steatohepatitis (POA: Yes)  Resolved Problems:    Hyperkalemia (POA: Yes)      FOLLOW UP  Future Appointments   Date Time Provider Department Center   1/9/2025  9:00 AM Lester Cardoso D.O. [facility] None   1/29/2025  1:00 PM ENDOCRINOLOGY [facility] PHARMACIST MARKUS Bailey     April JANEL Balbuena A.P.R.NChey  [id number] [address]  [phone]    Schedule an appointment as soon as possible for a visit  reschedule planned palliative care appointment      MEDICATIONS ON DISCHARGE     Medication List         START taking these medications        Instructions   acetaminophen 500 MG Tabs  Commonly known as: Tylenol   Take 2 Tablets by mouth in the morning, at noon, and at bedtime.  Dose: 1,000 mg     BD Pen Needle Ann Marie U/F  Generic drug: Insulin Pen Needle 32 G x 4 mm   Doctor's comments: Per patient/formulary preference. ICD-10 code: E10.65 - Uncontrolled type 1 Diabetes Mellitus  Use one pen needle in pen device to inject insulin once daily.     dexamethasone 4 MG Tabs  Start taking on: January 9, 2025  Commonly known as: Decadron   Take 1 Tablet by mouth 2 times a day for 6 days.  Dose: 4 mg     DULoxetine 30 MG Cpep  Commonly known as: Cymbalta   Take 1 Capsule by mouth every day.  Dose: 30 mg     Lantus SoloStar 100 UNIT/ML Sopn injection  Generic drug: insulin glargine   Inject 5 Units under the skin every evening.  Dose: 5 Units     methocarbamol 750 MG Tabs  Commonly known as: Robaxin   Take 1 Tablet by mouth 4 times a day as needed (muscle / bone pain).  Dose: 750 mg     omeprazole 20 MG delayed-release capsule  Start taking on: January 9, 2025  Commonly known as: PriLOSEC   Take 1 Capsule by mouth every day.  Dose: 20 mg            CHANGE how you take these medications        Instructions   famotidine 20 MG Tabs  What changed: when to take this  Commonly known as: Pepcid   TAKE 1 TABLET BY MOUTH TWICE A DAY            CONTINUE taking these medications        Instructions   amLODIPine 10 MG Tabs  Commonly known as: Norvasc   Take 10 mg by mouth every day.   Dose: 10 mg     atorvastatin 40 MG Tabs  Commonly known as: Lipitor   TAKE 1 TABLET BY MOUTH EVERY DAY IN THE EVENING  Dose: 40 mg     cyanocobalamin 500 MCG Tabs  Commonly known as: Vitamin B-12   Take 500 mcg by mouth every day.  Dose: 500 mcg     FreeStyle John 3 Plus Sensor Misc   1 Each every 14 days.  Dose: 1 Each     gabapentin 100 MG Caps  Commonly known as: Neurontin   Doctor's comments: Take immediately after dialysis  Take 1 Capsule by mouth  every Monday, Wednesday, and Friday.  Dose: 100 mg     HumaLOG KwikPen 100 UNIT/ML Sopn injection PEN  Generic drug: insulin lispro   Inject 0-9 Units under the skin 3 times a day before meals. 70 - 150 mg/dL = 0 Units 151 - 200 mg/dL = 2 Units 201 - 250 mg/dL = 3 Units 251 - 300 mg/dL = 5 Units 301 - 350 mg/dL = 6 Units 351 - 400 mg/dL = 8 Units Over 400 mg/dL = 9 Units  Dose: 0-9 Units     lidocaine 5 % Ptch  Commonly known as: Lidoderm   Place 1 Patch on the skin every 24 hours.  Dose: 1 Patch     lidocaine-prilocaine 2.5-2.5 % Crea  Commonly known as: Emla   Apply 1 g topically as needed (AVF prior to dialysis). APPLY TO AFFECTED AREA AVF ACCESS 30-60 MINS PRIOR TO DIALYSIS TREATMENT WRAP IN SARAN WRAP  Dose: 1 g     liothyronine 5 MCG Tabs  Commonly known as: Cytomel   Take 1 Tablet by mouth every day.  Dose: 5 mcg     metoprolol  MG Tb24  Commonly known as: Toprol XL   Take 100 mg by mouth every evening.  Dose: 100 mg     oxycodone 15 MG immediate release tablet  Commonly known as: Oxy-IR   Take 1 Tablet by mouth every four hours as needed for Severe Pain for up to 7 days.  Dose: 15 mg     pantoprazole 40 MG Tbec  Commonly known as: Protonix   Take 1 Tablet by mouth every day.  Dose: 40 mg     Senokot S 8.6-50 MG Tabs  Generic drug: senna-docusate   Take 1 Tablet by mouth every day.   Dose: 1 Tablet     sevelamer 800 MG Tabs  Commonly known as: Renagel   Take 800 mg by mouth 3 times a day with meals.  Dose: 800 mg     Synthroid 125 MCG Tabs  Generic drug: levothyroxine   Take 2 Tablets by mouth every morning on an empty stomach.  Dose: 250 mcg     traZODone 150 MG Tabs  Commonly known as: Desyrel   Take 150 mg by mouth at bedtime.   Dose: 150 mg     ursodiol 300 MG Caps  Commonly known as: Actigall   Take 300 mg by mouth 3 times a day.  Dose: 300 mg     venlafaxine 150 MG extended-release capsule  Commonly known as: Effexor-XR   Take 150 mg by mouth every day.   Dose: 150 mg     vitamin D3 1000 Unit (25  mcg) Tabs  Commonly known as: Cholecalciferol   Take 1,000 Units by mouth 2 times a day.  Dose: 1,000 Units            STOP taking these medications      cyclobenzaprine 10 mg Tabs  Commonly known as: Flexeril              Allergies  Allergies   Allergen Reactions    Tape Unspecified and Rash     Blisters, paper tape is ok  Other reaction(s): Rash       DIET  Orders Placed This Encounter   Procedures    Diet Order Diet: Cardiac     Standing Status:   Standing     Number of Occurrences:   1     Order Specific Question:   Diet:     Answer:   Cardiac [6]       ACTIVITY  As tolerated.  Weight bearing as tolerated    CONSULTATIONS  None    PROCEDURES  None    LABORATORY  Lab Results   Component Value Date    SODIUM 131 (L) 01/08/2025    POTASSIUM 5.7 (H) 01/08/2025    CHLORIDE 94 (L) 01/08/2025    CO2 26 01/08/2025    GLUCOSE 571 (HH) 01/08/2025    BUN 26 (H) 01/08/2025    CREATININE 2.59 (H) 01/08/2025    CREATININE 1.0 01/08/2009        Lab Results   Component Value Date    WBC 9.2 01/08/2025    HEMOGLOBIN 10.5 (L) 01/08/2025    HEMATOCRIT 33.3 (L) 01/08/2025    PLATELETCT 202 01/08/2025        Total time of the discharge process exceeds 42 minutes.

## 2025-01-08 NOTE — ASSESSMENT & PLAN NOTE
History of type 1 diabetes mellitus followed by endocrinology with last A1c 10.6%  -Decrease home glargine to 15 units daily  - Insulin sliding scale  - Titrate glucose between 140 and 180

## 2025-01-08 NOTE — ED PROVIDER NOTES
ER Provider Note    Scribed for Davin Tobar II, M.D. by Abilio Howard. 1/8/2025  12:23 AM    Primary Care Provider: ZOË Maxwell    CHIEF COMPLAINT   Chief Complaint   Patient presents with    Back Pain     Pt states for a long time she has had pain between her scapula, has been seen for it. States pain is back and worse tonight.      EXTERNAL RECORDS REVIEWED  Inpatient Notes   Reviewed note that shows the patient was hospitalized on 12/31/24 for hyperkalemia. She is on dialysis, has cirrhosis. She requested palliative consult. Home health required outpatient pain management referral. The patient came back to the ED on the 6th. Labs were done where her potassium and BGL were okay so she was treated with ketamine, dilaudid and a lidocaine patch.     Reviewed admission in 2/28/2020. The patient had an MR spine where she had a spinal infection at the T9/T10 region where she had a surgery.     CT Cspine in 2016 showed she had a spinal fusion at C4-C7. At C7/T1 there was severe disc dessecation. She also had some neuroforaminal narrowing.     HPI/ROS  LIMITATION TO HISTORY   Select: : None  OUTSIDE HISTORIAN(S):  Family Daughter, DPOA.     Anu Manuel is a 69 y.o. female with ESRD who is dialyzed M/W/F who presents to the ED for evaluation of back pain returning tonight. The daughter also reports the patient has radiating pain down her arms. The patient is ambulatory at baseline. The patient was here two days ago and received a ketamine infusion and dilaudid which helped. The daughter reports the patient has been followed by palliative care for a few years and notes appointment tomorrow, requesting medication pain medications. The daughter reports the patient's insurance stopped covering her pain medications. The patient's last prescription was filled on 11/18/24 for 15 mg oxycodone and ran out around December 21st. She is unsure when she ran out because she cut down to stretch the  "prescription. She was originally prescribed pain medication due to an autoimmune liver condition three years ago. She also has a history of hypothyroidism, Type I Diabetes. The patient reports feeling ill due to a postoperative infection after a spine surgery years ago.     PAST MEDICAL HISTORY  Past Medical History:   Diagnosis Date    Accidental drug overdose 08/27/2012    Adverse effect of anesthesia     in 2008 \"throat closes up\"\"anxiety\" ?laryngospasm, kept in ICU. Pt states no problems currently 2018.     Anemia     Anesthesia     in 2008 \"throat closes up\"\"anxiety\" ?laryngospasm, kept in ICU. Pt states no problems currently 2018.     Arrhythmia     A FIB    Arthritis     right shoulder, hands, OSTEO    Bowel habit changes More frequent    Breath shortness     Broken hip (HCC) 04/2024    Broken hip possibly the pelvis.  Inoperable    Cataract 2022    BILAT IOL    Cigarette smoker quit 2013    Dental disorder     upper denture    depression 08/30/2016    denies depression, states has anxiety and panic attacks    Diabetes mellitus type 1 (HCC) 1989    insulin    Dialysis patient ([facility]) Short time due to a kidney injury from a infection    DIALYSIS, M, [facility], [facility], [facility]    Emphysema of lung (HCC)     COPD    Encounter for long-term (current) use of insulin (HCC) 09/25/2013    GI bleed     Heart burn     High cholesterol     Hypertension     Hypothyroidism, postsurgical 1970    Indigestion     Infectious disease      had hepatitis C, tested neg.    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, uncontrolled(250.63) " 06/10/2015    Vertigo      SURGICAL HISTORY  Past Surgical History:   Procedure Laterality Date    AV FISTULA REVISION Left 12/5/2024    Procedure: LIGATION OF LEFT UPPER ARM DIALYSIS GRAFT;  Surgeon: Denis Brown M.D.;  Location: [facility];  Service: General    PB REMOVE PERM CANNULA/CATHETER  09/23/2024    Procedure: REMOVAL OF PERITONEAL DIALYSIS CATHETER;  Surgeon: Denis Brown M.D.;  Location: [facility];  Service: General    AV FISTULA CREATION Left 09/23/2024    Procedure: CREATION OF LEFT UPPER EXTREMITY DIALYSIS GRAFT;  Surgeon: Denis Brown M.D.;  Location: [facility]ERY [facility];  Service: General    PB LAP INSERT INTRAPERITONEAL CATHETER  06/20/2024    Procedure: LAPAROSCOPIC INSERTION OF PERITONEAL DIALYSIS CATHETER;  Surgeon: Denis Brown M.D.;  Location: [facility];  Service: General    NE ERCP,DIAGNOSTIC N/A 01/14/2022    Procedure: ERCP, DIAGNOSTIC - WITH SIGMOID COLON BIOPSY AND STENT REMOVAL;  Surgeon: Cr Haro M.D.;  Location: [facility];  Service: Gastroenterology    THADDEUS BY LAPAROSCOPY N/A 10/28/2021    Procedure: CHOLECYSTECTOMY, LAPAROSCOPIC;  Surgeon: Tello Trammell M.D.;  Location: [facility];  Service: General    NE ERCP,DIAGNOSTIC N/A 10/26/2021    Procedure: ERCP (ENDOSCOPIC RETROGRADE CHOLANGIOPANCREATOGRAPHY);  Surgeon: Cr Haro M.D.;  Location: SURGERY SAME DAY [facility];  Service: Gastroenterology    NE UPPER GI ENDOSCOPY,BIOPSY N/A 10/26/2021    Procedure: GASTROSCOPY, WITH BIOPSY;  Surgeon: Cr Haro M.D.;  Location: SURGERY SAME DAY [facility];  Service: Gastroenterology    NE UPPER GI ENDOSCOPY,DIAGNOSIS N/A 10/26/2021    Procedure: GASTROSCOPY;  Surgeon: Cr Haro M.D.;  Location: SURGERY SAME DAY [facility];  Service: Gastroenterology    CATH PLACEMENT  01/25/2020    Procedure: INSERTION, CATHETER PERM;  Surgeon: Rola Mendoza M.D.;  Location: [facility];  Service: General    IRRIGATION &  DEBRIDEMENT NEURO  01/19/2020    Procedure: IRRIGATION AND DEBRIDEMENT, WOUND THORACIC AND LUMBAR;  Surgeon: Ryan Roman M.D.;  Location: SURGERY [facility];  Service: Neurosurgery    FL INS/RPLC SPI NPG/RCVR POCKET N/A 12/16/2019    Procedure: EXPLORATION AT THORACIC 8 - 9, REPLACEMENT OF  NEUROSTIMULATOR LEAD;  Surgeon: Ryan Roman M.D.;  Location: SURGERY [facility];  Service: Neurosurgery    SPINAL CORD STIMULATOR N/A 10/26/2018    Procedure: SPINAL CORD STIMULATOR;  Surgeon: Ryan Roman M.D.;  Location: SURGERY [facility];  Service: Neurosurgery    THORACIC LAMINECTOMY N/A 10/26/2018    Procedure: THORACIC LAMINECTOMY - FOR;  Surgeon: Ryan Roman M.D.;  Location: SURGERY [facility];  Service: Neurosurgery    FL NJX AA&/STRD TFRML EPI LUMBAR/SACRAL 1 LEVEL Right 08/31/2016    Procedure: INJ-FORAMEN EPI LUM/SAC SNGL L4-5;  Surgeon: Sukhi Godfrey M.D.;  Location: SURGERY [facility];  Service: Pain Management    LUMBAR LAMINECTOMY DISKECTOMY Right 05/10/2016    Procedure: LUMBAR L4-5 HEMILAMINECTOMY DISKECTOMY ;  Surgeon: Arnold Keyes M.D.;  Location: [facility];  Service:     CERVICAL FUSION POSTERIOR  01/16/2009    Performed by TARA CONTRERAS at [facility]    HARDWARE REMOVAL NEURO  01/16/2009    Performed by TARA CONTRERAS at [facility]    NECK EXPLORATION  01/16/2009    Performed by TARA CONTRERAS at [facility]    SHOULDER ARTHROSCOPY W/ ROTATOR CUFF REPAIR  10/09/2008    Performed by SHERLY CASTANEDA at [facility]    SHOULDER DECOMPRESSION ARTHROSCOPIC  06/17/2008    Performed by SHERLY CASTANEDA at [facility]    CLAVICLE DISTAL EXCISION  06/17/2008    Performed by SHERLY CASTANEDA at [facility]    CERVICAL DISK AND FUSION ANTERIOR  03/12/2008    HYSTERECTOMY, VAGINAL  2006    PARTIAL    APPENDECTOMY  2004    THYROID LOBECTOMY  1973    TONSILLECTOMY  1963    CATARACT  PHACO WITH IOL      LUMPECTOMY  1976, 2005    Breast     LUMPECTOMY      OTHER ABDOMINAL SURGERY  2004    OTHER CARDIAC SURGERY  2020 stents inserted     FAMILY HISTORY  Family History   Problem Relation Age of Onset    Hypertension Mother     Cancer Father      SOCIAL HISTORY   reports that she has been smoking cigarettes. She has a 15 pack-year smoking history. She has never used smokeless tobacco. She reports that she does not currently use alcohol. She reports that she does not currently use drugs after having used the following drugs: Inhaled, Oral, and Marijuana.    CURRENT MEDICATIONS  Previous Medications    AMLODIPINE (NORVASC) 10 MG TAB    Take 10 mg by mouth every day.       ATORVASTATIN (LIPITOR) 40 MG TAB    TAKE 1 TABLET BY MOUTH EVERY DAY IN THE EVENING    CONTINUOUS GLUCOSE SENSOR (FREESTYLE PARISA 3 PLUS SENSOR) MISC    1 Each every 14 days.    CYANOCOBALAMIN (VITAMIN B-12) 500 MCG TAB    Take 500 mcg by mouth every day.    CYCLOBENZAPRINE (FLEXERIL) 10 MG TAB    Take 1 Tablet by mouth 3 times a day as needed for Muscle Spasms.    FAMOTIDINE (PEPCID) 20 MG TAB    TAKE 1 TABLET BY MOUTH TWICE A DAY    GABAPENTIN (NEURONTIN) 100 MG CAP    Take 1 Capsule by mouth every Monday, Wednesday, and Friday.    INSULIN LISPRO 100 UNIT/ML SC SOPN INJECTION PEN    Inject 0-9 Units under the skin 3 times a day before meals. 70 - 150 mg/dL = 0 Units 151 - 200 mg/dL = 2 Units 201 - 250 mg/dL = 3 Units 251 - 300 mg/dL = 5 Units 301 - 350 mg/dL = 6 Units 351 - 400 mg/dL = 8 Units Over 400 mg/dL = 9 Units    LIDOCAINE (LIDODERM) 5 % PATCH    Place 1 Patch on the skin every 24 hours.    LIDOCAINE-PRILOCAINE (EMLA) 2.5-2.5 % CREAM    Apply 1 g topically as needed (AVF prior to dialysis). APPLY TO AFFECTED AREA AVF ACCESS 30-60 MINS PRIOR TO DIALYSIS TREATMENT WRAP IN SARAN WRAP    LIOTHYRONINE (CYTOMEL) 5 MCG TAB    Take 1 Tablet by mouth every day.    METOPROLOL SR (TOPROL XL) 100 MG TABLET SR 24 HR    Take 100 mg by  "mouth every evening.    OXYCODONE (OXY-IR) 15 MG IMMEDIATE RELEASE TABLET    Take 1 Tablet by mouth every four hours as needed for Severe Pain for up to 7 days.    PANTOPRAZOLE (PROTONIX) 40 MG TABLET DELAYED RESPONSE    Take 1 Tablet by mouth every day.    SENNA-DOCUSATE (SENOKOT S) 8.6-50 MG TAB    Take 1 Tablet by mouth every day.       SEVELAMER (RENAGEL) 800 MG TAB    Take 800 mg by mouth 3 times a day with meals.    SYNTHROID 125 MCG TAB    Take 2 Tablets by mouth every morning on an empty stomach.    TRAZODONE (DESYREL) 150 MG TAB    Take 150 mg by mouth at bedtime.       URSODIOL (ACTIGALL) 300 MG CAP    Take 300 mg by mouth 3 times a day.    VENLAFAXINE (EFFEXOR-XR) 150 MG EXTENDED-RELEASE CAPSULE    Take 150 mg by mouth every day.       VITAMIN D3 (CHOLECALCIFEROL) 1000 UNIT (25 MCG) TAB    Take 1,000 Units by mouth 2 times a day.     ALLERGIES  Tape    PHYSICAL EXAM  BP (!) 157/88   Pulse 80   Temp 36.7 °C (98 °F) (Temporal)   Resp 19   Ht 1.676 m (5' 6\")   Wt 54.4 kg (120 lb)   LMP 04/29/2005 (LMP Unknown)   SpO2 95%   BMI 19.37 kg/m²   Physical Exam  Vitals and nursing note reviewed.   Constitutional:       Comments: Appears to be in pain   HENT:      Head: Normocephalic and atraumatic.      Nose: Nose normal.      Mouth/Throat:      Mouth: Mucous membranes are moist.   Eyes:      Extraocular Movements: Extraocular movements intact.      Pupils: Pupils are equal, round, and reactive to light.   Cardiovascular:      Rate and Rhythm: Normal rate and regular rhythm.   Pulmonary:      Effort: Pulmonary effort is normal.      Breath sounds: Normal breath sounds.   Abdominal:      Comments: Mild distension but no tenderness or guarding   Musculoskeletal:      Cervical back: Normal range of motion. No rigidity.      Comments: Tenderness between shoulder blades at thoracic spine. Nonpitting edema at bilateral lower legs. Normal strength.    Skin:     Findings: No rash.   Neurological:      General: No " focal deficit present.      Mental Status: She is alert and oriented to person, place, and time.      Cranial Nerves: No cranial nerve deficit.   Psychiatric:      Comments: Labile mood     DIAGNOSTIC STUDIES    EKG/LABS  Results for orders placed or performed during the hospital encounter of 01/07/25   CBC WITH DIFFERENTIAL    Collection Time: 01/08/25  1:14 AM   Result Value Ref Range    WBC 9.2 4.8 - 10.8 K/uL    RBC 3.42 (L) 4.20 - 5.40 M/uL    Hemoglobin 10.5 (L) 12.0 - 16.0 g/dL    Hematocrit 33.3 (L) 37.0 - 47.0 %    MCV 97.4 81.4 - 97.8 fL    MCH 30.7 27.0 - 33.0 pg    MCHC 31.5 (L) 32.2 - 35.5 g/dL    RDW 53.5 (H) 35.9 - 50.0 fL    Platelet Count 202 164 - 446 K/uL    MPV 10.4 9.0 - 12.9 fL    Neutrophils-Polys 73.80 (H) 44.00 - 72.00 %    Lymphocytes 12.90 (L) 22.00 - 41.00 %    Monocytes 10.00 0.00 - 13.40 %    Eosinophils 1.50 0.00 - 6.90 %    Basophils 1.40 0.00 - 1.80 %    Immature Granulocytes 0.40 0.00 - 0.90 %    Nucleated RBC 0.00 0.00 - 0.20 /100 WBC    Neutrophils (Absolute) 6.77 1.82 - 7.42 K/uL    Lymphs (Absolute) 1.19 1.00 - 4.80 K/uL    Monos (Absolute) 0.92 (H) 0.00 - 0.85 K/uL    Eos (Absolute) 0.14 0.00 - 0.51 K/uL    Baso (Absolute) 0.13 (H) 0.00 - 0.12 K/uL    Immature Granulocytes (abs) 0.04 0.00 - 0.11 K/uL    NRBC (Absolute) 0.00 K/uL   COMP METABOLIC PANEL    Collection Time: 01/08/25  1:14 AM   Result Value Ref Range    Sodium 131 (L) 135 - 145 mmol/L    Potassium 5.7 (H) 3.6 - 5.5 mmol/L    Chloride 94 (L) 96 - 112 mmol/L    Co2 26 20 - 33 mmol/L    Anion Gap 11.0 7.0 - 16.0    Glucose 571 (HH) 65 - 99 mg/dL    Bun 26 (H) 8 - 22 mg/dL    Creatinine 2.59 (H) 0.50 - 1.40 mg/dL    Calcium 7.8 (L) 8.5 - 10.5 mg/dL    Correct Calcium 8.6 8.5 - 10.5 mg/dL    AST(SGOT) 37 12 - 45 U/L    ALT(SGPT) 24 2 - 50 U/L    Alkaline Phosphatase 630 (H) 30 - 99 U/L    Total Bilirubin 0.5 0.1 - 1.5 mg/dL    Albumin 3.0 (L) 3.2 - 4.9 g/dL    Total Protein 7.0 6.0 - 8.2 g/dL    Globulin 4.0 (H) 1.9 -  3.5 g/dL    A-G Ratio 0.8 g/dL   Sed Rate    Collection Time: 01/08/25  1:14 AM   Result Value Ref Range    Sed Rate Westergren 102 (H) 0 - 25 mm/hour   CRP QUANTITIVE (NON-CARDIAC)    Collection Time: 01/08/25  1:14 AM   Result Value Ref Range    Stat C-Reactive Protein 3.92 (H) 0.00 - 0.75 mg/dL   ESTIMATED GFR    Collection Time: 01/08/25  1:14 AM   Result Value Ref Range    GFR (CKD-EPI) 20 (A) >60 mL/min/1.73 m 2   BETA-HYDROXYBUTYRIC ACID    Collection Time: 01/08/25  1:14 AM   Result Value Ref Range    beta-Hydroxybutyric Acid 0.85 (H) 0.02 - 0.27 mmol/L   POCT glucose device results    Collection Time: 01/08/25  2:17 AM   Result Value Ref Range    POC Glucose, Blood 514 (HH) 65 - 99 mg/dL   POCT glucose device results    Collection Time: 01/08/25  2:58 AM   Result Value Ref Range    POC Glucose, Blood 488 (HH) 65 - 99 mg/dL   POCT glucose device results    Collection Time: 01/08/25  4:37 AM   Result Value Ref Range    POC Glucose, Blood 426 (HH) 65 - 99 mg/dL   POCT glucose device results    Collection Time: 01/08/25  6:05 AM   Result Value Ref Range    POC Glucose, Blood 325 (H) 65 - 99 mg/dL     *Note: Due to a large number of results and/or encounters for the requested time period, some results have not been displayed. A complete set of results can be found in Results Review.      RADIOLOGY/PROCEDURES     Radiologist interpretation:  MR-CERVICAL SPINE-WITH & W/O    (Results Pending)   MR-THORACIC SPINE-WITH & W/O    (Results Pending)       COURSE & MEDICAL DECISION MAKING     ASSESSMENT, COURSE AND PLAN  Care Narrative:     12:23 AM - This is an emergency department evaluation of a 69 y.o. female with a history of ESRD who is dialyzed every M/W/F who presents with returning back pain. She has chronic back pain and was unable to get oxycodone prescription refilled at the end of December. Several ER visits since. Had to be admitted last week for hyperkalemia and hyperglycemia.  Given short prescription for  oxycodone but out again. CTA aorta without dissection was done end of last month. She does have history of spinal infection after surgery a few years ago. Also she has type 1 diabetes. Difficult to tell if this is chronic pain/withdrawal (most likely) or there is an underlying infection.  Labs and vitals, exam not suggesting infection but pain otherwise unexplained and extremely difficult to control. Ordered CRP, sed rate, cbc with diff, cmp. She will be given a ketamine infusion . I have ordered MRI c and tspine w/ and w/o contrast. Ordered ativan to be given incase she is unable to tolerate MRI scan.     2:12 AM - I was informed of an elevated blood glucose of 571. Bicarb is normal. CRP elevated to 3.92. Ordered 5.4 units as opposed to higher dose because it looks like she was over correcting when receiving insulin 10 units inpatient.     2:50 AM - I was informed the patient is requesting additional medication for pain.     4:31 AM - I was informed MRI will be unable to be completed until the morning.     5:23 AM - Reviewed patient labs which show a sed rate of 102 (further supporting need for MRI of spine). The patient is also having continued pain. Ordered additional Dilaudid 1 mg IV.  Because of continued needs for pain medication, will need dialysis today, and possible underlying spinal process I believe she would be best cared for inpatient.  Aashish hospitalist.      5:27 AM I discussed the patient's case and the above findings with Dr. Arteaga (Hospitalist) who agrees to evaluate the patient for hospitalization.     ED OBS: Yes; I am placing the patient in to an observation status due to a diagnostic uncertainty as well as therapeutic intensity. Patient placed in observation status at 12:23 AM, 1/8/2025.     Observation plan is as follows: Monitor for symptom management and diagnostic results     Upon Reevaluation, the patient's condition has: not improved; and will be escalated to  hospitalization.    Patient discharged from ED Observation status at 5:31 AM  (Time) 1/8/2025      PROBLEM LIST  #Thoracic back pain   -increased inflammatory markers   -MRI c and t spine pending   -requires high amounts of pain medication   -could be chronic pain in setting of opiate withdrawal    #Hyperglycemia   -poorly controlled type1 DM,  hg A1c was last 10   -not DKA   -decreasing with IV insulin bolus here in the ER    #ESRD   -MWF   -did not need emergent dialysis overnight        DISPOSITION AND DISCUSSIONS  I have discussed management of the patient with the following physicians and MILAN's:  Dr. Arteaga (Hospitalist)    Discussion of management with other QHP or appropriate source(s): None     DISPOSITION:  Patient will be hospitalized by Dr. Arteaga in guarded condition.     FINAL DIAGNOSIS  1. Intractable pain    2. Hyperglycemia    3. End stage renal disease (HCC)      Abilio MCKEON (Scribkayla), am scribing for, and in the presence of, MARY Patel II.    Electronically signed by: Abilio Howard (Scribe), 1/8/2025    Davin MCKEON II, M* personally performed the services described in this documentation, as scribed by Abilio Howard in my presence, and it is both accurate and complete.     The note accurately reflects work and decisions made by me.  Davin Tobar II, M.D.  1/8/2025  8:22 AM

## 2025-01-08 NOTE — H&P
Hospital Medicine History & Physical Note    Date of Service  1/8/2025    Primary Care Physician  PAM Maxwell.    Code Status  Full Code    Chief Complaint  Chief Complaint   Patient presents with    Back Pain     Pt states for a long time she has had pain between her scapula, has been seen for it. States pain is back and worse tonight.        History of Presenting Illness  Patient is a 69-year-old female with past medical history of ESRD on Monday Wednesday Friday dialysis, type 1 diabetes mellitus, and hypothyroidism followed by endocrinology who presents due to intractable back pain.  Patient has acute on chronic intractable midthoracic back pain that radiates to her bilateral arms.  States she has been dealing with this issue for a long time, has had frequent admissions and emergency department visits over the past couple of months to this issue.  Previous CTA negative for aortic dissection and is currently resting comfortably after some pain management.  Last emergency department visit, patient was prescribed lidocaine patch and ketamine infusion with significant improvement in pain however progressively worsened at home and return today 1/8 due to significant back pain and reportedly writhing in emergency department.  On questioning, patient minimally participated due to somnolence however denies any fever or chills, states the pain is focal to her mid thoracic spine. , found to have hyperglycemia to 571, potassium 5.7.  MRI of cervical and thoracic spine ordered the patient be admitted for intractable pain.    I discussed the plan of care with patient.    Review of Systems  Review of Systems   Unable to perform ROS: Other   Constitutional:  Negative for chills and fever.   Eyes:  Negative for blurred vision.   Respiratory:  Negative for cough.    Cardiovascular:  Negative for chest pain.   Musculoskeletal:  Positive for back pain. Negative for myalgias.   Neurological:  Negative for  dizziness, sensory change, focal weakness and loss of consciousness.       Past Medical History   has a past medical history of Accidental drug overdose (08/27/2012), Adverse effect of anesthesia, Anemia, Anesthesia, Arrhythmia, Arthritis, Bowel habit changes (More frequent), Breath shortness, Broken hip (HCC) (04/2024), Cataract (2022), Cigarette smoker (quit 2013), Dental disorder, depression (08/30/2016), Diabetes mellitus type 1 (Formerly [facility]) (1989), Dialysis patient (Formerly [facility]) (Short time due to a kidney injury from a infection), Emphysema of lung ([facility]), Encounter for long-term (current) use of insulin (Formerly [facility]) (09/25/2013), GI bleed, Heart burn, High cholesterol, Hypertension, Hypothyroidism, postsurgical (1970), Indigestion, Infectious disease, Jaundice (2021 Liver disease), Joint replacement, Liver disease, Liver failure (HCC), Myocardial infarct ([facility]) (2019), Pain, Polyneuropathy in diabetes(357.2) (06/10/2015), Renal disorder, Status post appendectomy, 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    Surgical History   has a past surgical history that includes hysterectomy, vaginal (2006); thyroid lobectomy (1973); lumpectomy (1976, 2005); cervical disk and fusion anterior (03/12/2008); tonsillectomy (1963); cervical fusion posterior (01/16/2009); hardware removal neuro (01/16/2009); neck exploration (01/16/2009); lumpectomy; lumbar laminectomy diskectomy (Right, 05/10/2016); shoulder decompression arthroscopic (06/17/2008); clavicle distal excision (06/17/2008); shoulder arthroscopy w/ rotator cuff repair (10/09/2008); pr njx aa&/strd tfrml epi lumbar/sacral 1 level (Right, 08/31/2016); spinal cord stimulator (N/A, 10/26/2018); thoracic laminectomy (N/A, 10/26/2018); appendectomy (2004); pr ins/rplc spi npg/rcvr pocket (N/A, 12/16/2019); irrigation & debridement neuro (01/19/2020); cath placement (01/25/2020); pr ercp,diagnostic (N/A,  10/26/2021); pr upper gi endoscopy,biopsy (N/A, 10/26/2021); pr upper gi endoscopy,diagnosis (N/A, 10/26/2021); peter by laparoscopy (N/A, 10/28/2021); pr ercp,diagnostic (N/A, 2022); other cardiac surgery ( stents inserted); other abdominal surgery (); pr lap insert intraperitoneal catheter (2024); cataract phaco with iol; pb remove perm cannula/catheter (2024); av fistula creation (Left, 2024); and av fistula revision (Left, 2024).     Family History  family history includes Cancer in her father; Hypertension in her mother.   Family history reviewed with patient. There is no family history that is pertinent to the chief complaint.     Social History   reports that she has been smoking cigarettes. She has a 15 pack-year smoking history. She has never used smokeless tobacco. She reports that she does not currently use alcohol. She reports that she does not currently use drugs after having used the following drugs: Inhaled, Oral, and Marijuana.    Allergies  Allergies   Allergen Reactions    Tape Unspecified and Rash     Blisters, paper tape is ok  Other reaction(s): Rash       Medications  Prior to Admission Medications   Prescriptions Last Dose Informant Patient Reported? Taking?   Continuous Glucose Sensor (FREESTYLE PARISA 3 PLUS SENSOR) [facility] [place]  Patient No No   Si Each every 14 days.   SYNTHROID 125 MCG Tab  Patient No No   Sig: Take 2 Tablets by mouth every morning on an empty stomach.   amLODIPine (NORVASC) 10 MG Tab  Patient Yes No   Sig: Take 10 mg by mouth every day.      atorvastatin (LIPITOR) 40 MG Tab  Patient No No   Sig: TAKE 1 TABLET BY MOUTH EVERY DAY IN THE EVENING   cyanocobalamin (VITAMIN B-12) 500 MCG Tab  Patient Yes No   Sig: Take 500 mcg by mouth every day.   cyclobenzaprine (FLEXERIL) 10 mg Tab  Patient No No   Sig: Take 1 Tablet by mouth 3 times a day as needed for Muscle Spasms.   famotidine (PEPCID) 20 MG Tab  Patient No No   Sig: TAKE 1 TABLET BY MOUTH  TWICE A DAY   Patient taking differently: Take 20 mg by mouth 2 times a day.   gabapentin (NEURONTIN) 100 MG Cap  Patient No No   Sig: Take 1 Capsule by mouth every Monday, Wednesday, and Friday.   insulin lispro 100 UNIT/ML SC SOPN injection PEN  Patient No No   Sig: Inject 0-9 Units under the skin 3 times a day before meals. 70 - 150 mg/dL = 0 Units 151 - 200 mg/dL = 2 Units 201 - 250 mg/dL = 3 Units 251 - 300 mg/dL = 5 Units 301 - 350 mg/dL = 6 Units 351 - 400 mg/dL = 8 Units Over 400 mg/dL = 9 Units   lidocaine (LIDODERM) 5 % Patch   No No   Sig: Place 1 Patch on the skin every 24 hours.   lidocaine-prilocaine (EMLA) 2.5-2.5 % Cream  Patient Yes No   Sig: Apply 1 g topically as needed (AVF prior to dialysis). APPLY TO AFFECTED AREA AVF ACCESS 30-60 MINS PRIOR TO DIALYSIS TREATMENT WRAP IN SARAN WRAP   liothyronine (CYTOMEL) 5 MCG Tab  Patient No No   Sig: Take 1 Tablet by mouth every day.   metoprolol SR (TOPROL XL) 100 MG TABLET SR 24 HR  Patient Yes No   Sig: Take 100 mg by mouth every evening.   oxycodone (OXY-IR) 15 MG immediate release tablet  Patient No No   Sig: Take 1 Tablet by mouth every four hours as needed for Severe Pain for up to 7 days.   pantoprazole (PROTONIX) 40 MG Tablet Delayed Response  Patient No No   Sig: Take 1 Tablet by mouth every day.   senna-docusate (SENOKOT S) 8.6-50 MG Tab  Patient Yes No   Sig: Take 1 Tablet by mouth every day.      sevelamer (RENAGEL) 800 MG Tab  Patient Yes No   Sig: Take 800 mg by mouth 3 times a day with meals.   traZODone (DESYREL) 150 MG Tab  Patient Yes No   Sig: Take 150 mg by mouth at bedtime.      ursodiol (ACTIGALL) 300 MG Cap  Patient Yes No   Sig: Take 300 mg by mouth 3 times a day.   venlafaxine (EFFEXOR-XR) 150 MG extended-release capsule  Patient Yes No   Sig: Take 150 mg by mouth every day.      vitamin D3 (CHOLECALCIFEROL) 1000 Unit (25 mcg) Tab  Patient Yes No   Sig: Take 1,000 Units by mouth 2 times a day.      Facility-Administered  Medications: None       Physical Exam  Temp:  [36.7 °C (98 °F)] 36.7 °C (98 °F)  Pulse:  [80-90] 85  Resp:  [13-19] 16  BP: (126-168)/(64-88) 126/64  SpO2:  [92 %-98 %] 97 %  Blood Pressure : 126/64   Temperature: 36.7 °C (98 °F)   Pulse: 85   Respiration: 16   Pulse Oximetry: 97 %       Physical Exam  Vitals and nursing note reviewed.   Constitutional:       General: She is not in acute distress.  HENT:      Head: Normocephalic and atraumatic.      Nose: Nose normal.      Mouth/Throat:      Mouth: Mucous membranes are moist.   Eyes:      General: No scleral icterus.     Extraocular Movements: Extraocular movements intact.   Cardiovascular:      Rate and Rhythm: Normal rate and regular rhythm.      Heart sounds: No murmur heard.     No friction rub. No gallop.   Pulmonary:      Breath sounds: No wheezing, rhonchi or rales.   Abdominal:      General: Abdomen is flat. Bowel sounds are normal. There is no distension.      Palpations: Abdomen is soft.      Tenderness: There is no abdominal tenderness.   Musculoskeletal:         General: No swelling or tenderness.   Skin:     General: Skin is warm.   Neurological:      Mental Status: She is alert.         Laboratory:  Recent Labs     01/06/25  1247 01/08/25  0114   WBC 8.9 9.2   RBC 3.57* 3.42*   HEMOGLOBIN 11.0* 10.5*   HEMATOCRIT 35.4* 33.3*   MCV 99.2* 97.4   MCH 30.8 30.7   MCHC 31.1* 31.5*   RDW 56.1* 53.5*   PLATELETCT 206 202   MPV 10.3 10.4     Recent Labs     01/06/25  1247 01/08/25  0114   SODIUM 134* 131*   POTASSIUM 5.3 5.7*   CHLORIDE 97 94*   CO2 29 26   GLUCOSE 147* 571*   BUN 40* 26*   CREATININE 3.85* 2.59*   CALCIUM 8.2* 7.8*     Recent Labs     01/06/25  1247 01/08/25  0114   ALTSGPT 25 24   ASTSGOT 49* 37   ALKPHOSPHAT 562* 630*   TBILIRUBIN 0.6 0.5   GLUCOSE 147* 571*         Recent Labs     01/06/25  1247   NTPROBNP 2495*         Recent Labs     01/06/25  1247   TROPONINT 72*       Imaging:  MR-CERVICAL SPINE-WITH & W/O    (Results Pending)    MR-THORACIC SPINE-WITH & W/O    (Results Pending)       X-Ray:  I have personally reviewed the images and compared with prior images.    Assessment/Plan:  Justification for Admission Status  I anticipate this patient is appropriate for observation status at this time because intractable back pain    Patient will need a Med/Surg bed on MEDICAL service .  The need is secondary to intractable back pain.    * Intractable pain- (present on admission)  Assessment & Plan  Patient with pain between her shoulder blades consistently that radiates to her bilateral arms.  Has been dealing with this pain for a long time with elevated ESR to 108 with uncontrolled diabetes.  - MRI of thoracic and cervical spine ordered for potential discitis  - Unclear etiology at this time, has had chronic pain previously received opiates however last prescription in November now with frequent emergency department visits previous admission  - Previous CTA of the chest unremarkable for dissection, current chest x-ray without any widened mediastinum and currently resting comfortably in bed  - Ordered multimodal pain control    Other cirrhosis of liver (HCC)- (present on admission)  Assessment & Plan  History of cirrhosis of the liver    Hyperkalemia- (present on admission)  Assessment & Plan  Elevated potassium 5.7, with hyperglycemia will be prescribed insulin  - Does have history of ESRD will need dialysis a.m.  -Repeat renal function panel in a.m.    ESRD needing dialysis (HCC)- (present on admission)  Assessment & Plan  Monday Wednesday Friday dialysis, follows with [facility]  - Will need consult in a.m. for continued dialysis  - Monitor potassium  - Continue home sevelamer    Type 1 diabetes mellitus with hyperglycemia (HCC)- (present on admission)  Assessment & Plan  History of type 1 diabetes mellitus followed by endocrinology with last A1c 10.6%  -Decrease home glargine to 15 units daily  - Insulin sliding scale  - Titrate glucose  between 140 and 180          VTE prophylaxis: heparin ppx

## 2025-01-08 NOTE — ASSESSMENT & PLAN NOTE
Elevated potassium 5.7, with hyperglycemia will be prescribed insulin  - Does have history of ESRD will need dialysis a.m.  -Repeat renal function panel in a.m.

## 2025-01-08 NOTE — ASSESSMENT & PLAN NOTE
Patient with pain between her shoulder blades consistently that radiates to her bilateral arms.  Has been dealing with this pain for a long time with elevated ESR to 108 with uncontrolled diabetes.  - MRI of thoracic and cervical spine ordered for potential discitis  - Unclear etiology at this time, has had chronic pain previously received opiates however last prescription in November now with frequent emergency department visits previous admission  - Previous CTA of the chest unremarkable for dissection, current chest x-ray without any widened mediastinum and currently resting comfortably in bed  - Ordered multimodal pain control

## 2025-01-08 NOTE — ED NOTES
Break RN: spoke with Charge RN and ERP regarding placing IV in L arm to ensure that there were no contraindications due to pt's previous fistula

## 2025-01-08 NOTE — DISCHARGE INSTRUCTIONS
Discharge Instructions per Yanick Neves M.D.    You were hospitalized for intractable back pain. MRI and physical exam did not find a concerning cause of your pain, which is primarily chronic. You have been initiated on steroids (dexamethasone) for a pain flare. You are started on muscle relaxant (robaxin) and a nerve pain medication (duloxetine) which can help.    Opiates (e.g. oxycodone) are not safe nor effective long-term solution. You have been referred to a palliative medicine specialist for advanced pain management.    DIET: Regular    ACTIVITY: Regular    DIAGNOSIS: Intractable Pain    Return to ER if you faint for any reason, develop bleeding in your bowels or vomit, develop sudden severe chest pain or shortness of breath.

## 2025-01-08 NOTE — ED NOTES
This RN spoke with ROSEANNA Hopkins-- as per him, pt will be taken for MRI in the morning.   see above

## 2025-01-08 NOTE — ED TRIAGE NOTES
Anu Chelly Kaleb  69 y.o. female    Chief Complaint   Patient presents with    Back Pain     Pt states for a long time she has had pain between her scapula, has been seen for it. States pain is back and worse tonight.      Pt BIBA for above complaint, pt was seen here yesterday for same. Pt is dialysis pt, went today. Pt supposed to start on palliative care tomorrow. Pt took 30mg hydrocodone and 2mg xanax prior to coming in. Pt A&Ox4. VSS.      Vitals:    01/07/25 2203   BP: (!) 157/88   Pulse: 80   Resp: 19   Temp: 36.7 °C (98 °F)   SpO2: 95%       Triage process explained to patient, apologized for wait time, and returned to lobby.  Pt informed to notify staff of any change in condition.

## 2025-01-08 NOTE — RESPIRATORY CARE
COPD EDUCATION by COPD CLINICAL EDUCATOR  1/8/2025 at 1:10 PM by Elida Bryan RRT     Patient reviewed by COPD education team. Patient does not have a history or diagnosis of COPD, does not use COPD medications and is not on home oxygen. Therefore, patient does not qualify for the COPD program. Patient offered Smoking Cessation education and literature, patient declined, I thanked her for her time.

## 2025-01-08 NOTE — CARE PLAN
The patient is Stable - Low risk of patient condition declining or worsening    Shift Goals  Clinical Goals: pain <5 by end of shift, HD, free from injury or falls  Patient Goals: pain control  Family Goals: elissa    Progress made toward(s) clinical / shift goals:      Problem: Pain - Standard  Goal: Alleviation of pain or a reduction in pain to the patient’s comfort goal  Description: Target End Date:  Prior to discharge or change in level of care    Document on Vitals flowsheet    1.  Document pain using the appropriate pain scale per order or unit policy  2.  Educate and implement non-pharmacologic comfort measures (i.e. relaxation, distraction, massage, cold/heat therapy, etc.)  3.  Pain management medications as ordered  4.  Reassess pain after pain med administration per policy  5.  If opiods administered assess patient's response to pain medication is appropriate per POSS sedation scale  6.  Follow pain management plan developed in collaboration with patient and interdisciplinary team (including palliative care or pain specialists if applicable)  Outcome: Progressing     Problem: Knowledge Deficit - Standard  Goal: Patient and family/care givers will demonstrate understanding of plan of care, disease process/condition, diagnostic tests and medications  Description: Target End Date:  1-3 days or as soon as patient condition allows    Document in Patient Education    1.  Patient and family/caregiver oriented to unit, equipment, visitation policy and means for communicating concern  2.  Complete/review Learning Assessment  3.  Assess knowledge level of disease process/condition, treatment plan, diagnostic tests and medications  4.  Explain disease process/condition, treatment plan, diagnostic tests and medications  Outcome: Progressing       Patient is not progressing towards the following goals:

## 2025-01-09 ENCOUNTER — OFF SITE VISIT (OUTPATIENT)
Dept: PALLIATIVE MEDICINE | Facility: HOSPICE | Age: 70
End: 2025-01-09

## 2025-01-09 ENCOUNTER — APPOINTMENT (OUTPATIENT)
Dept: PHYSICAL MEDICINE AND REHAB | Facility: MEDICAL CENTER | Age: 70
End: 2025-01-09
Payer: MEDICARE

## 2025-01-09 ENCOUNTER — HOSPITAL ENCOUNTER (EMERGENCY)
Facility: MEDICAL CENTER | Age: 70
End: 2025-01-09
Payer: MEDICARE

## 2025-01-09 VITALS
TEMPERATURE: 98.3 F | BODY MASS INDEX: 19.29 KG/M2 | HEART RATE: 59 BPM | OXYGEN SATURATION: 93 % | DIASTOLIC BLOOD PRESSURE: 61 MMHG | RESPIRATION RATE: 18 BRPM | HEIGHT: 66 IN | WEIGHT: 120 LBS | SYSTOLIC BLOOD PRESSURE: 124 MMHG

## 2025-01-09 DIAGNOSIS — N18.6 ESRD NEEDING DIALYSIS (HCC): ICD-10-CM

## 2025-01-09 DIAGNOSIS — R73.9 HYPERGLYCEMIA: ICD-10-CM

## 2025-01-09 DIAGNOSIS — M54.6 ACUTE RIGHT-SIDED THORACIC BACK PAIN: ICD-10-CM

## 2025-01-09 DIAGNOSIS — Z99.2 ESRD NEEDING DIALYSIS (HCC): ICD-10-CM

## 2025-01-09 DIAGNOSIS — Z78.9 TRANSITION FROM ACUTE CARE TO HOSPICE: ICD-10-CM

## 2025-01-09 LAB — GLUCOSE BLD STRIP.AUTO-MCNC: 365 MG/DL (ref 65–99)

## 2025-01-09 PROCEDURE — 36415 COLL VENOUS BLD VENIPUNCTURE: CPT

## 2025-01-09 PROCEDURE — 99350 HOME/RES VST EST HIGH MDM 60: CPT | Mod: 25

## 2025-01-09 PROCEDURE — 99498 ADVNCD CARE PLAN ADDL 30 MIN: CPT

## 2025-01-09 PROCEDURE — 82962 GLUCOSE BLOOD TEST: CPT

## 2025-01-09 PROCEDURE — 99497 ADVNCD CARE PLAN 30 MIN: CPT

## 2025-01-09 PROCEDURE — G0318 PR PROLONG HOME E&M ADDL 15 MIN: HCPCS

## 2025-01-09 PROCEDURE — 99283 EMERGENCY DEPT VISIT LOW MDM: CPT

## 2025-01-09 RX ORDER — OXYCODONE HYDROCHLORIDE 10 MG/1
10 TABLET ORAL EVERY 4 HOURS PRN
Qty: 42 TABLET | Refills: 0 | Status: SHIPPED | OUTPATIENT
Start: 2025-01-09 | End: 2025-01-10

## 2025-01-09 ASSESSMENT — FIBROSIS 4 INDEX: FIB4 SCORE: 2.51

## 2025-01-09 NOTE — CARE PLAN
The patient is Stable - Low risk of patient condition declining or worsening    Shift Goals  Clinical Goals: pain <5 by end of shift, HD, free from injury or falls  Patient Goals: pain control  Family Goals: elissa    Progress made toward(s) clinical / shift goals:      Problem: Pain - Standard  Goal: Alleviation of pain or a reduction in pain to the patient’s comfort goal  Description: Target End Date:  Prior to discharge or change in level of care    Document on Vitals flowsheet    1.  Document pain using the appropriate pain scale per order or unit policy  2.  Educate and implement non-pharmacologic comfort measures (i.e. relaxation, distraction, massage, cold/heat therapy, etc.)  3.  Pain management medications as ordered  4.  Reassess pain after pain med administration per policy  5.  If opiods administered assess patient's response to pain medication is appropriate per POSS sedation scale  6.  Follow pain management plan developed in collaboration with patient and interdisciplinary team (including palliative care or pain specialists if applicable)  1/8/2025 1820 by Maykel Calzada R.N.  Outcome: Met  1/8/2025 1536 by Maykel Calzada R.N.  Outcome: Progressing     Problem: Knowledge Deficit - Standard  Goal: Patient and family/care givers will demonstrate understanding of plan of care, disease process/condition, diagnostic tests and medications  Description: Target End Date:  1-3 days or as soon as patient condition allows    Document in Patient Education    1.  Patient and family/caregiver oriented to unit, equipment, visitation policy and means for communicating concern  2.  Complete/review Learning Assessment  3.  Assess knowledge level of disease process/condition, treatment plan, diagnostic tests and medications  4.  Explain disease process/condition, treatment plan, diagnostic tests and medications  1/8/2025 1820 by Maykel Calzada R.N.  Outcome: Met  1/8/2025 1536 by Maykel Calzada  R.N.  Outcome: Progressing       Patient is not progressing towards the following goals:

## 2025-01-09 NOTE — DISCHARGE PLANNING
Received a note from ED charge nurse to setup home hospice for pt.     Chart review done. There is no hospice referral order. However, there was a Palliative Order on 01/06/25 so CM sent a message to Jennifer DELGADO for Palliative Care for [facility].

## 2025-01-09 NOTE — PROGRESS NOTES
3hr HD started @ 1307 and completed @ 1613. Soft bp during tx,bp meds given prior to HD,net UF = 1000ml. VSS post HD: 113/67,63,16,97.2F,99% @ RA.KENTRELL TDC dressing changed,CDI.Report given to Billie Calzada RN.

## 2025-01-09 NOTE — ED PROVIDER NOTES
ED Provider Note    CHIEF COMPLAINT  Chief Complaint   Patient presents with    High Blood Sugar     Pt bib ems from home. Pt was d/c 1/7 from [facility]. Pt reports high BG in the 400's. Pt hx of type 1 DM and dialysis.       EXTERNAL RECORDS REVIEWED  \inpatient mission discharge summary, patient was discharged from the hospital yesterday, had presented to the emergency department for intractable back pain, was admitted overnight    HPI/ROS  LIMITATION TO HISTORY   Select: : None  OUTSIDE HISTORIAN(S):  Daughter providing clinically relevant collateral history    Anu Manuel is a 69 y.o. female with past medical history of type 1 diabetes, ESRD on dialysis Monday Wednesday Friday presenting to the emergency department for evaluation of elevated blood sugar.  Patient was discharged from the hospital after she was admitted for back pain.  Reportedly went home, daughter was monitoring her blood sugar and she was concerned because blood sugar was consistently elevated in the 400s.  Patient says that she just did not take her insulin this evening.  Patient has been seen by palliative care, has been in and out of the hospital multiple times after having missed dialysis, experienced hyperkalemia, experiencing ongoing intractable back pain.    Patient has expressed to family intermittently that she might want to go on hospice care.  This evening, patient says that she does not want to be here and she wants to be put on hospice.  She does not have any specific complaints, no chest pain, shortness of breath, abdominal pain, nausea, vomiting.    PAST MEDICAL HISTORY   has a past medical history of Accidental drug overdose (08/27/2012), Adverse effect of anesthesia, Anemia, Anesthesia, Arrhythmia, Arthritis, Bowel habit changes (More frequent), Breath shortness, Broken hip (HCC) (04/2024), Cataract (2022), Cigarette smoker (quit 2013), Dental disorder, depression (08/30/2016), Diabetes mellitus type 1 ([facility]) (1989),  Dialysis patient (HCC) (Short time due to a kidney injury from a infection), Emphysema of lung (HCC), Encounter for long-term (current) use of insulin (HCC) (09/25/2013), GI bleed, Heart burn, High cholesterol, Hypertension, Hypothyroidism, postsurgical (1970), Indigestion, Infectious disease, Jaundice (2021 Liver disease), Joint replacement, Liver disease, Liver failure (HCC), Myocardial infarct (HCC) (2019), Pain, Polyneuropathy in diabetes(357.2) (06/10/2015), Renal disorder, Status post appendectomy, 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    SURGICAL HISTORY   has a past surgical history that includes hysterectomy, vaginal (2006); thyroid lobectomy (1973); lumpectomy (1976, 2005); cervical disk and fusion anterior (03/12/2008); tonsillectomy (1963); cervical fusion posterior (01/16/2009); hardware removal neuro (01/16/2009); neck exploration (01/16/2009); lumpectomy; lumbar laminectomy diskectomy (Right, 05/10/2016); shoulder decompression arthroscopic (06/17/2008); clavicle distal excision (06/17/2008); shoulder arthroscopy w/ rotator cuff repair (10/09/2008); njx aa&/strd tfrml epi lumbar/sacral 1 level (Right, 08/31/2016); spinal cord stimulator (N/A, 10/26/2018); thoracic laminectomy (N/A, 10/26/2018); appendectomy (2004); ins/rplc spi npg/rcvr pocket (N/A, 12/16/2019); irrigation & debridement neuro (01/19/2020); cath placement (01/25/2020); ercp,diagnostic (N/A, 10/26/2021); upper gi endoscopy,biopsy (N/A, 10/26/2021); upper gi endoscopy,diagnosis (N/A, 10/26/2021); peter by laparoscopy (N/A, 10/28/2021); ercp,diagnostic (N/A, 01/14/2022); other cardiac surgery (2020 stents inserted); other abdominal surgery (2004); lap insert intraperitoneal catheter (06/20/2024); cataract phaco with iol; pb remove perm cannula/catheter (09/23/2024); av fistula creation (Left, 09/23/2024); and av fistula revision (Left,  12/5/2024).    FAMILY HISTORY  Family History   Problem Relation Age of Onset    Hypertension Mother     Cancer Father        SOCIAL HISTORY  Social History     Tobacco Use    Smoking status: Every Day     Current packs/day: 0.50     Average packs/day: 0.5 packs/day for 30.0 years (15.0 ttl pk-yrs)     Types: Cigarettes    Smokeless tobacco: Never    Tobacco comments:     Currently smokes 1/2 - 3/4 a pack daily.  Has smoked for about 50 years.   Vaping Use    Vaping status: Never Used   Substance and Sexual Activity    Alcohol use: Not Currently    Drug use: Not Currently     Types: Inhaled, Oral, Marijuana     Comment: Marijuana - Occasional; edibles    Sexual activity: Not Currently       CURRENT MEDICATIONS  Home Medications       Reviewed by Priya Lemos R.N. (Registered Nurse) on 01/09/25 at 0218  Med List Status: Partial     Medication Last Dose Status   acetaminophen (TYLENOL) 500 MG Tab  Active   amLODIPine (NORVASC) 10 MG Tab  Active   atorvastatin (LIPITOR) 40 MG Tab  Active   Continuous Glucose Sensor (FREESTYLE PARISA 3 PLUS SENSOR) Misc  Active   cyanocobalamin (VITAMIN B-12) 500 MCG Tab  Active   dexamethasone (DECADRON) 4 MG Tab  Active   DULoxetine (CYMBALTA) 30 MG Cap DR Particles  Active   famotidine (PEPCID) 20 MG Tab  Active   gabapentin (NEURONTIN) 100 MG Cap  Active   insulin glargine (LANTUS SOLOSTAR) 100 UNIT/ML Solution Pen-injector injection  Active   insulin lispro 100 UNIT/ML SC SOPN injection PEN  Active   Insulin Pen Needle 32 G x 4 mm  Active   lidocaine (LIDODERM) 5 % Patch  Active   lidocaine-prilocaine (EMLA) 2.5-2.5 % Cream  Active   liothyronine (CYTOMEL) 5 MCG Tab  Active   methocarbamol (ROBAXIN) 750 MG Tab  Active   metoprolol SR (TOPROL XL) 100 MG TABLET SR 24 HR  Active   omeprazole (PRILOSEC) 20 MG delayed-release capsule  Active   oxycodone (OXY-IR) 15 MG immediate release tablet  Active   pantoprazole (PROTONIX) 40 MG Tablet Delayed Response  Active  "  senna-docusate (SENOKOT S) 8.6-50 MG Tab  Active   sevelamer (RENAGEL) 800 MG Tab  Active   SYNTHROID 125 MCG Tab  Active   traZODone (DESYREL) 150 MG Tab  Active   ursodiol (ACTIGALL) 300 MG Cap  Active   venlafaxine (EFFEXOR-XR) 150 MG extended-release capsule  Active   vitamin D3 (CHOLECALCIFEROL) 1000 Unit (25 mcg) Tab  Active                    ALLERGIES  Allergies   Allergen Reactions    Tape Unspecified and Rash     Blisters, paper tape is ok  Other reaction(s): Rash       PHYSICAL EXAM  VITAL SIGNS: BP (!) 144/70   Pulse 68   Temp 36.8 °C (98.3 °F) (Temporal)   Resp 18   Ht 1.676 m (5' 6\")   Wt 54.4 kg (120 lb)   LMP 2005 (LMP Unknown)   SpO2 96%   BMI 19.37 kg/m²    General: non-toxic, no acute distress eyes closed when I walked in the room but easily arousable, lucid,, able to answer questions appropriately, not confused  Neuro: oriented x 3, moving all extremities.   HEENT:   - Head: Normocephalic, atraumatic  - Eyes: PERRL  - Ears/Nose: normal external nose and ears  - Mouth: moist mucosal membranes  Resp: clear to auscultation, no increased work of breathing  CV: Regular rate and rhythm  Abd: Soft, non-tender, non-distended  Extremities: No peripheral edema  Psych: lucid          DIAGNOSTIC STUDIES / PROCEDURES    EKG  My independent EKG interpretation:  Results for orders placed or performed during the hospital encounter of 24   EKG   Result Value Ref Range    Report       [facility] Emergency Dept.    Test Date:  2024  Pt Name:    KALPANA BUTTS               Department: ER  MRN:        [id number]                      Room:        21  Gender:     Female                       Technician: [id number]  :        1955                   Requested By:ER TRIAGE PROTOCOL  Order #:    [id number]                    Reading MD: Davin Tobar II, MD    Measurements  Intervals                                Axis  Rate:       75                           P:        "   66  ND:         148                          QRS:        40  QRSD:       100                          T:          75  QT:         401  QTc:        448    Interpretive Statements  Sinus rhythm  Multiple ventricular premature complexes  Probable left atrial enlargement  No ST elevation or depression. Normal twaves  Impression: Sinus rhythm with ST changes. PVCs present.  Compared to ECG 2024 22:55:27  Ventricular premature complex(es) now present  Left ventricular hypertr ophy no longer present  Electronically Signed On 2024 04:12:11 PST by Davin Tobar II, MD     EKG   Result Value Ref Range    Report       [facility]    Test Date:  2025  Pt Name:    KALPANA BUTTS               Department: 171  MRN:        [id number]                      Room:       T732  Gender:     Female                       Technician: [facility]  :        1955                   Requested By:ANABELLE MEREDITH  Order #:    [id number]                    Reading MD: Raquel Lagunas MD    Measurements  Intervals                                Axis  Rate:       59                           P:          64  ND:         156                          QRS:        47  QRSD:       96                           T:          60  QT:         457  QTc:        453    Interpretive Statements  Sinus bradycardia  Low voltage, extremity leads  Nonspecific T abnormalities, lateral leads  Electronically Signed On 2025 10:16:52 PST by Raquel Lagunas MD       *Note: Due to a large number of results and/or encounters for the requested time period, some results have not been displayed. A complete set of results can be found in Results Review.       LABS  Results for orders placed or performed during the hospital encounter of 25   POCT glucose device results    Collection Time: 25  2:22 AM   Result Value Ref Range    POC Glucose, Blood 365 (H) 65 - 99 mg/dL     *Note: Due to a large number of results and/or encounters for the requested  time period, some results have not been displayed. A complete set of results can be found in Results Review.       RADIOLOGY  I have independently interpreted the diagnostic imaging associated with this visit and am waiting the final reading from the radiologist.   My preliminary interpretation is as follows:   -   Radiologist interpretation:   No orders to display           MEDICAL DECISION MAKING      ED COURSE AND PLAN    This is a 69-year-old female presenting to the emergency department for evaluation of elevated blood sugar.  Daughter called EMS because she was concerned because blood sugar was persistently elevated in the 300-400 range.  Patient has no acute complaints this evening.  She is exhibiting frustration with bouncing in and out of the hospital every several days.  Has been experiencing intractable back pain.  Patient seen by palliative care and she tells me this evening that she wants to be placed on hospice care.  Says that she has poor quality of life and does not want to go in and out of the hospital anymore.  She has not been managing her diabetes, says that she does not have anything to look forward to.  At this time no indication to obtain labs.  Will plan to discussed with  from [facility].  Patient does say that she does not want to stay in the emergency department overnight, says that she wants to go home as soon as possible.    ED Course as of 01/09/25 0300  ------------------------------------------------------------  Time: 01/09 0258  Comment: I talked to the daughter and the patient again, patient wants to go home and transition to hospice care.  Daughter says that they have used [facility] for hospice in the past for her dad and they really liked their service.  I discussed the case with  from [facility], recommended that we print out a facesheet and [place]  can work on establishing hospice care in the morning.    Patient is comfortable  going home at this time.  Daughter is comfortable with the plan.  Appropriate for discharge.  By: SG       Procedures:      ----------------------------------------------------------------------------------  DISCUSSIONS    I have discussed management of the patient with the following physicians and MILAN's:      Discussion of management with other Q or appropriate source(s):     Escalation of care considered, and ultimately not performed: Considered obtaining labs     Barriers to care at this time, including but not limited to:     Decision tools and prescription drugs considered including, but not limited to:     FINAL IMPRESSION    1. Hyperglycemia    2. Transition from acute care to hospice        New Prescriptions    No medications on file         DISPOSITION    Discharge home, Stable          This chart was dictated using an electronic voice recognition software. The chart has been reviewed and edited but there is still possibility for dictation errors due to limitation of software.    Morgan Gee,  1/9/2025

## 2025-01-09 NOTE — ED NOTES
Daughter agrees to take pt home.  Pt given discharge instructions; verbalized understanding of instructions.  Out of ER in wheel chair w/ daughter

## 2025-01-09 NOTE — DISCHARGE INSTRUCTIONS
Asha was seen in our emergency department for elevated blood sugar.  She wanted to be transitioned to hospice care.  Our  in the morning can help you guys set this up.  You should receive a call in the morning, let them know that you would like to be set up with [facility]    If you are unable to manage her symptoms at home, bring her back for reevaluation.

## 2025-01-09 NOTE — ED TRIAGE NOTES
"Chief Complaint   Patient presents with    High Blood Sugar     Pt bib ems from home. Pt was d/c 1/7 from [facility]. Pt reports high BG in the 400's. Pt hx of type 1 DM and dialysis.     BP (!) 144/70   Pulse 68   Temp 36.8 °C (98.3 °F) (Temporal)   Resp 18   Ht 1.676 m (5' 6\")   Wt 54.4 kg (120 lb)   LMP 04/29/2005 (LMP Unknown)   SpO2 96%   BMI 19.37 kg/m²     "

## 2025-01-09 NOTE — CONSULTS
"Nephrology Consultation    Date of Service  1/8/2025    Referring Physician  Yanick Neves M.D.    Consulting Physician  Ankit Diggs M.D.    Reason for Consultation  Management of ESRD on HD      History of Presenting Illness  69 y.o. female with history of diabetes, ESRD on hemodialysis Monday Wednesday Friday who presented 1/7/2025 with low back pain.    The patient says her back pain has been worsening for the last several weeks.  She says it got to the point where she could not tolerate anymore, so she came to the hospital.  Patient is being worked up for her back pain.    Regarding her kidney failure, she says she has been on dialysis for about 1 year.  She says diabetes caused her kidney failure.  She is anuric.  She said she had an attempted an AV graft, but it never worked.  She dialyzes via a right IJ permacath.  She missed her dialysis on Monday due to an ER visit, but had to make up outpatient dialysis treatment yesterday.  She is amenable to dialysis treatment today.    Review of Systems  Review of Systems   Constitutional:  Negative for fever.   Respiratory:  Negative for shortness of breath.    Cardiovascular:  Negative for chest pain.   Gastrointestinal:  Negative for abdominal pain.   Musculoskeletal:  Positive for back pain.   All other systems reviewed and are negative.      Past Medical History  Past Medical History:   Diagnosis Date    Accidental drug overdose 08/27/2012    Adverse effect of anesthesia     in 2008 \"throat closes up\"\"anxiety\" ?laryngospasm, kept in ICU. Pt states no problems currently 2018.     Anemia     Anesthesia     in 2008 \"throat closes up\"\"anxiety\" ?laryngospasm, kept in ICU. Pt states no problems currently 2018.     Arrhythmia     A FIB    Arthritis     right shoulder, hands, OSTEO    Bowel habit changes More frequent    Breath shortness     Broken hip (HCC) 04/2024    Broken hip possibly the pelvis.  Inoperable    Cataract 2022    BILAT IOL    Cigarette smoker quit 2013 " "   Dental disorder     upper denture    depression 08/30/2016    denies depression, states has anxiety and panic attacks    Diabetes mellitus type 1 (HCC) 1989    insulin    Dialysis patient (HCC) Short time due to a kidney injury from a infection    DIALYSIS, M[facility], [facility], [facility]    Emphysema of lung (HCC)     COPD    Encounter for long-term (current) use of insulin (HCC) 09/25/2013    GI bleed     Heart burn     High cholesterol     Hypertension     Hypothyroidism, postsurgical 1970    Indigestion     Infectious disease      had hepatitis C, tested neg.    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;  Service: General    PB REMOVE PERM CANNULA/CATHETER  09/23/2024    Procedure: REMOVAL OF PERITONEAL DIALYSIS CATHETER;  Surgeon: Denis Brown M.D.;  Location: SURGERY [facility];  Service: General    AV FISTULA CREATION Left 09/23/2024    Procedure: CREATION OF LEFT UPPER EXTREMITY DIALYSIS GRAFT;  Surgeon: Denis Brown M.D.;  Location: SURGERY [facility];  Service: General    PB LAP INSERT INTRAPERITONEAL CATHETER  06/20/2024    Procedure: LAPAROSCOPIC INSERTION OF PERITONEAL DIALYSIS CATHETER;  Surgeon: Denis Brown M.D.;  Location: SURGERY [facility];  Service: General    WA ERCP,DIAGNOSTIC N/A 01/14/2022    Procedure: ERCP, " DIAGNOSTIC - WITH SIGMOID COLON BIOPSY AND STENT REMOVAL;  Surgeon: Cr Haro M.D.;  Location: SURGERY [facility];  Service: Gastroenterology    THADDEUS BY LAPAROSCOPY N/A 10/28/2021    Procedure: CHOLECYSTECTOMY, LAPAROSCOPIC;  Surgeon: Tello Trammell M.D.;  Location: [facility];  Service: General    AL ERCP,DIAGNOSTIC N/A 10/26/2021    Procedure: ERCP (ENDOSCOPIC RETROGRADE CHOLANGIOPANCREATOGRAPHY);  Surgeon: Cr Haro M.D.;  Location: SURGERY SAME DAY [facility];  Service: Gastroenterology    AL UPPER GI ENDOSCOPY,BIOPSY N/A 10/26/2021    Procedure: GASTROSCOPY, WITH BIOPSY;  Surgeon: Cr Haro M.D.;  Location: SURGERY SAME DAY [facility];  Service: Gastroenterology    AL UPPER GI ENDOSCOPY,DIAGNOSIS N/A 10/26/2021    Procedure: GASTROSCOPY;  Surgeon: Cr Hrao M.D.;  Location: SURGERY SAME DAY [facility];  Service: Gastroenterology    CATH PLACEMENT  01/25/2020    Procedure: INSERTION, CATHETER PERM;  Surgeon: Rola Mendoza M.D.;  Location: [facility];  Service: General    IRRIGATION & DEBRIDEMENT NEURO  01/19/2020    Procedure: IRRIGATION AND DEBRIDEMENT, WOUND THORACIC AND LUMBAR;  Surgeon: Ryan Roman M.D.;  Location: [facility];  Service: Neurosurgery    AL INS/RPLC SPI NPG/RCVR POCKET N/A 12/16/2019    Procedure: EXPLORATION AT THORACIC 8 - 9, REPLACEMENT OF  NEUROSTIMULATOR LEAD;  Surgeon: Ryan Roman M.D.;  Location: [facility];  Service: Neurosurgery    SPINAL CORD STIMULATOR N/A 10/26/2018    Procedure: SPINAL CORD STIMULATOR;  Surgeon: Ryan Roman M.D.;  Location: SURGERY [facility];  Service: Neurosurgery    THORACIC LAMINECTOMY N/A 10/26/2018    Procedure: THORACIC LAMINECTOMY - FOR;  Surgeon: Ryan Roman M.D.;  Location: [facility];  Service: Neurosurgery    AL NJX AA&/STRD TFRML EPI LUMBAR/SACRAL 1 LEVEL Right 08/31/2016    Procedure: INJ-FORAMEN EPI LUM/SAC SNGL L4-5;  Surgeon: Sukhi Godfrey M.D.;   Location: SURGERY [facility];  Service: Pain Management    LUMBAR LAMINECTOMY DISKECTOMY Right 05/10/2016    Procedure: LUMBAR L4-5 HEMILAMINECTOMY DISKECTOMY ;  Surgeon: Arnold Keyes M.D.;  Location: SURGERY [facility];  Service:     CERVICAL FUSION POSTERIOR  01/16/2009    Performed by TARA CONTRERAS at SURGERY [facility]    HARDWARE REMOVAL NEURO  01/16/2009    Performed by TARA CONTRERAS at SURGERY [facility]    NECK EXPLORATION  01/16/2009    Performed by TARA CONTRERAS at SURGERY [facility]    SHOULDER ARTHROSCOPY W/ ROTATOR CUFF REPAIR  10/09/2008    Performed by SHERLY CASTANEDA at [facility]    SHOULDER DECOMPRESSION ARTHROSCOPIC  06/17/2008    Performed by SHERLY CASTANEDA at [facility]    CLAVICLE DISTAL EXCISION  06/17/2008    Performed by SHERLY CASTANEDA at [facility]    CERVICAL DISK AND FUSION ANTERIOR  03/12/2008    HYSTERECTOMY, VAGINAL  2006    PARTIAL    APPENDECTOMY  2004    THYROID LOBECTOMY  1973    TONSILLECTOMY  1963    CATARACT PHACO WITH IOL      LUMPECTOMY  1976, 2005    Breast     LUMPECTOMY      OTHER ABDOMINAL SURGERY  2004    OTHER CARDIAC SURGERY  2020 stents inserted       Family History  Family History   Problem Relation Age of Onset    Hypertension Mother     Cancer Father        Social History  Social History     Socioeconomic History    Marital status:      Spouse name: Not on file    Number of children: Not on file    Years of education: Not on file    Highest education level: Not on file   Occupational History    Not on file   Tobacco Use    Smoking status: Every Day     Current packs/day: 0.50     Average packs/day: 0.5 packs/day for 30.0 years (15.0 ttl pk-yrs)     Types: Cigarettes    Smokeless tobacco: Never    Tobacco comments:     Currently smokes 1/2 - 3/4 a pack daily.  Has smoked for about 50 years.   Vaping Use    Vaping status: Never Used   Substance and Sexual Activity    Alcohol use: Not  Currently    Drug use: Not Currently     Types: Inhaled, Oral, Marijuana     Comment: Marijuana - Occasional; edibles    Sexual activity: Not Currently   Other Topics Concern    Not on file   Social History Narrative    Not on file     Social Drivers of Health     Financial Resource Strain: Medium Risk (10/16/2024)    Overall Financial Resource Strain (CARDIA)     Difficulty of Paying Living Expenses: Somewhat hard   Food Insecurity: Food Insecurity Present (10/16/2024)    Hunger Vital Sign     Worried About Running Out of Food in the Last Year: Sometimes true     Ran Out of Food in the Last Year: Never true   Transportation Needs: No Transportation Needs (10/16/2024)    PRAPARE - Transportation     Lack of Transportation (Medical): No     Lack of Transportation (Non-Medical): No   Physical Activity: Inactive (10/16/2024)    Exercise Vital Sign     Days of Exercise per Week: 0 days     Minutes of Exercise per Session: 0 min   Stress: Stress Concern Present (10/16/2024)    [employer] - Occupational Stress Questionnaire     Feeling of Stress : Very much   Social Connections: Socially Isolated (10/16/2024)    Social Connection and Isolation Panel [NHANES]     Frequency of Communication with Friends and Family: Three times a week     Frequency of Social Gatherings with Friends and Family: More than three times a week     Attends [employer] Services: Never     Active Member of Clubs or Organizations: No     Attends Club or Organization Meetings: Never     Marital Status:    Intimate Partner Violence: Not At Risk (1/8/2025)    Humiliation, Afraid, Rape, and Kick questionnaire     Fear of Current or Ex-Partner: No     Emotionally Abused: No     Physically Abused: No     Sexually Abused: No   Housing Stability: Low Risk  (10/16/2024)    Housing Stability Vital Sign     Unable to Pay for Housing in the Last Year: No     Number of Times Moved in the Last Year: 0     Homeless in the Last  Year: No       Medications  Current Facility-Administered Medications   Medication Dose Route Frequency Provider Last Rate Last Admin    senna-docusate (Pericolace Or Senokot S) 8.6-50 MG per tablet 2 Tablet  2 Tablet Oral Q EVENING Erich Arteaga D.O.        And    polyethylene glycol/lytes (Miralax) Packet 1 Packet  1 Packet Oral QDAY PRN RODRICK DobbinsOChey        ondansetron (Zofran) syringe/vial injection 4 mg  4 mg Intravenous Q4HRS PRN RODRICK DobbinsO.        ondansetron (Zofran ODT) dispertab 4 mg  4 mg Oral Q4HRS PRN RODRICK DobbinsO.        Pharmacy Consult Request ...Pain Management Review 1 Each  1 Each Other PHARMACY TO DOSE Erich Arteaga D.O.        acetaminophen (Tylenol) tablet 1,000 mg  1,000 mg Oral TID RODRICK DobbinsOChey   1,000 mg at 01/08/25 1059    Followed by    [START ON 1/13/2025] acetaminophen (Tylenol) tablet 1,000 mg  1,000 mg Oral TID PRN RODRICK DobbinsO.        oxyCODONE immediate-release (Roxicodone) tablet 5 mg  5 mg Oral Q3HRS PRN RODRICK DobbinsO.        Or    oxyCODONE immediate release (Roxicodone) tablet 10 mg  10 mg Oral Q3HRS PRN MIRANDA Dobbins.O.   10 mg at 01/08/25 1336    Or    HYDROmorphone (Dilaudid) injection 0.5 mg  0.5 mg Intravenous Q3HRS PRN MIRANDA Dobbins.O.        amLODIPine (Norvasc) tablet 10 mg  10 mg Oral DAILY MIRANDA Dobbins.O.   10 mg at 01/08/25 1011    atorvastatin (Lipitor) tablet 40 mg  40 mg Oral Q EVENING MIRANDA Dobbins.O.        famotidine (Pepcid) tablet 20 mg  20 mg Oral DAILY MIRANDA Dobbins.O.   20 mg at 01/08/25 1011    gabapentin (Neurontin) capsule 100 mg  100 mg Oral MO, WE + FR Erich Arteaga D.O.   100 mg at 01/08/25 1149    lidocaine (Asperflex) 4 % patch 1 Patch  1 Patch Transdermal Q24HRS Erich Arteaga D.O.   1 Patch at 01/08/25 1012    liothyronine (Cytomel) tablet 5 mcg  5 mcg Oral DAILY Erich Arteaga D.O.   5 mcg at 01/08/25 1012    metoprolol SR  (Toprol XL) tablet 100 mg  100 mg Oral DAILY RODRICK DobbinsOChey   100 mg at 01/08/25 1011    sevelamer carbonate (Renvela) tablet 800 mg  800 mg Oral TID WITH MEALS RODRICK DobbinsOChey   800 mg at 01/08/25 1149    levothyroxine (Synthroid) tablet 250 mcg  250 mcg Oral AM ES RODRICK DobbinsOChey   250 mcg at 01/08/25 1012    traZODone (Desyrel) tablet 150 mg  150 mg Oral QHS RODRICK DobbinsOChey        ursodiol (Actigall) capsule 300 mg  300 mg Oral TID RODRICK DobbinsOChey   300 mg at 01/08/25 1149    venlafaxine XR (Effexor XR) capsule 150 mg  150 mg Oral DAILY RODRICK DobbinsOChey   150 mg at 01/08/25 1011    insulin GLARGINE (Lantus,Semglee) injection  15 Units Subcutaneous Q EVENING Erich Arteaga D.O.        And    insulin lispro (HumaLOG,AdmeLOG) subcutaneous injection  0.2 Units/kg/day Subcutaneous TID AC RODRICK DobbinsOChey   4 Units at 01/08/25 1152    And    insulin lispro (HumaLOG,AdmeLOG) subcutaneous injection  2-9 Units Subcutaneous 4X/DAY ACHS RODRICK DobbinsOChey   9 Units at 01/08/25 1151    And    dextrose 50% (D50W) injection 25 g  25 g Intravenous Q15 MIN PRN Erich Arteaga D.O.        heparin injection 5,000 Units  5,000 Units Subcutaneous Q12HRS Erich Arteaga D.O.        NS (Bolus) 0.9 % infusion 100 mL  100 mL Intravenous DIALYSIS PRN Ankit Diggs M.D.        dexamethasone (Decadron) tablet 4 mg  4 mg Oral BID Yanick Neves M.D.   4 mg at 01/08/25 1058    omeprazole (PriLOSEC) capsule 20 mg  20 mg Oral DAILY RODRICK DobbinsOChey   20 mg at 01/08/25 1011    methocarbamol (Robaxin) tablet 750 mg  750 mg Oral 4X/DAY Yanick Neves M.D.   750 mg at 01/08/25 1150    DULoxetine (Cymbalta) capsule 30 mg  30 mg Oral DAILY Yanick Neves M.D.        heparin intracatheter (for DIALYSIS USE ONLY) 1,800 Units  1,800 Units Intracatheter DIALYSIS PRN Ankit Diggs M.D.   1,800 Units at 01/08/25 1615    heparin intracatheter (for DIALYSIS USE ONLY) 1,800 Units  1,800  Units Intracatheter DIALYSIS PRN Ankit Diggs M.D.   1,800 Units at 01/08/25 8015       Allergies  Allergies   Allergen Reactions    Tape Unspecified and Rash     Blisters, paper tape is ok  Other reaction(s): Rash       Physical Exam  Temp:  [36.2 °C (97.1 °F)-36.7 °C (98 °F)] 36.2 °C (97.1 °F)  Pulse:  [77-90] 78  Resp:  [12-19] 16  BP: (126-168)/(63-88) 147/87  SpO2:  [91 %-99 %] 91 %    Physical Exam  Constitutional:       General: She is not in acute distress.     Appearance: She is well-developed.      Comments: Moaning in pain   HENT:      Head: Normocephalic and atraumatic.      Mouth/Throat:      Mouth: Mucous membranes are moist.      Pharynx: Oropharynx is clear. No oropharyngeal exudate.   Eyes:      General: No scleral icterus.     Extraocular Movements: Extraocular movements intact.   Neck:      Trachea: No tracheal deviation.   Cardiovascular:      Rate and Rhythm: Normal rate and regular rhythm.      Heart sounds: Normal heart sounds. No murmur heard.  Pulmonary:      Effort: Pulmonary effort is normal.      Breath sounds: No stridor. No rales.   Abdominal:      Palpations: Abdomen is soft.      Tenderness: There is no abdominal tenderness.   Musculoskeletal:         General: Normal range of motion.      Cervical back: Normal range of motion. No rigidity.      Right lower leg: No edema.      Left lower leg: No edema.   Skin:     General: Skin is warm and dry.   Neurological:      General: No focal deficit present.      Mental Status: She is alert and oriented to person, place, and time.   Psychiatric:         Mood and Affect: Mood normal.         Behavior: Behavior normal.     HD access: [facility]    Fluids      Laboratory  Labs reviewed, pertinent labs below.  Recent Labs     01/06/25  1247 01/08/25  0114   WBC 8.9 9.2   RBC 3.57* 3.42*   HEMOGLOBIN 11.0* 10.5*   HEMATOCRIT 35.4* 33.3*   MCV 99.2* 97.4   MCH 30.8 30.7   MCHC 31.1* 31.5*   RDW 56.1* 53.5*   PLATELETCT 206 202   MPV 10.3 10.4      Recent Labs     01/06/25  1247 01/08/25  0114   SODIUM 134* 131*   POTASSIUM 5.3 5.7*   CHLORIDE 97 94*   CO2 29 26   GLUCOSE 147* 571*   BUN 40* 26*   CREATININE 3.85* 2.59*   CALCIUM 8.2* 7.8*                URINALYSIS:  Lab Results   Component Value Date/Time    COLORURINE DK Yellow 02/28/2024 0544    CLARITY Clear 02/28/2024 0544    SPECGRAVITY 1.021 02/28/2024 0544    PHURINE 5.5 02/28/2024 0544    KETONES Negative 02/28/2024 0544    PROTEINURIN 300 (A) 02/28/2024 0544    BILIRUBINUR Small (A) 02/28/2024 0544    UROBILU 1.0 02/28/2024 0544    NITRITE Negative 02/28/2024 0544    LEUKESTERAS Negative 02/28/2024 0544    OCCULTBLOOD Trace (A) 02/28/2024 0544     [facility] [place]  Lab Results   Component Value Date/Time    TOTPROTUR 557.0 (H) 02/28/2024 0544      Lab Results   Component Value Date/Time    CREATININEU 82.41 02/28/2024 0544       Imaging interpreted by radiologist. Imaging reports reviewed with pertinent findings below  MR-THORACIC SPINE-WITH & W/O   Final Result         Mild degenerative changes at T9-10. Otherwise, no significant abnormality is identified in the thoracic spine.      MR-CERVICAL SPINE-WITH & W/O   Final Result         1.  Postoperative changes noted in the cervical spine with posterior lateral mass fusion between C4 and C7.      2.  Abnormal bone marrow signal abnormal enhancement identified at the C6-C7 level involving the adjacent endplates and the entirety of the C7 vertebral body. Slightly increased T2 signal is also noted within the disc space at this level with minimal    enhancement. However, this area did demonstrate a sclerotic appearance on previous CT. Minimal abnormal signal is present also within the prevertebral soft tissues at this level. A new infectious process cannot be completely excluded. Recommend    correlation with history, physical exam and consider short-term follow-up imaging.      3.  Moderate canal stenosis is noted at C6-C7.            Assessment/Plan  69 y.o.  "female with history of diabetes, ESRD on hemodialysis Monday Wednesday Friday who presented 1/7/2025 with low back pain.    1.  ESRD on hemodialysis Monday Wednesday Friday.  Anuric.  Plan on dialysis today per usual schedule.  Daily weights. Avoid NSAIDs and other nephrotoxins as the patient still urinates.  Check renal function panel daily.    2.  Dialysis access: Right IJ permacath. Had failed AVG attempt in the past.     3.  Low back pain, reason for admission.   Unclear etiology.    4.  Anemia of chronic disease.  I will order Epogen 3 times weekly with dialysis.  Check CBC daily.    5.  Hypertension.  Mildly uncontrolled. HTN usually improves with ultrafiltration with dialysis.  Recommend low-sodium diet.       6.  Hyperphosphatemia, uncontrolled as an outpatient.  Continue phosphorus binders, calcium acetate 1334 mg p.o. 3 times daily with meals.  Recommend low phosphorus diet.  Check \"renal function panel\" daily (includes phosphorus level).     7.  Goals of care.  I had a brief discussion with the patient about goals of care.  I discussed that if she continues to suffer, choosing to stop dialysis and pursue hospice is a reasonable option.    Discussed with Dr. Yanick Diggs MD  Nephrology  [facility]          "

## 2025-01-09 NOTE — DISCHARGE PLANNING
note:  Received a response from Jennifer DELGADO for Palliative Care. She will see pt today so notified her that ERP wants hospice referral sent.

## 2025-01-10 ENCOUNTER — HOSPITAL ENCOUNTER (EMERGENCY)
Facility: MEDICAL CENTER | Age: 70
End: 2025-01-10
Attending: EMERGENCY MEDICINE
Payer: MEDICARE

## 2025-01-10 ENCOUNTER — APPOINTMENT (OUTPATIENT)
Dept: RADIOLOGY | Facility: MEDICAL CENTER | Age: 70
End: 2025-01-10
Attending: EMERGENCY MEDICINE
Payer: MEDICARE

## 2025-01-10 VITALS
DIASTOLIC BLOOD PRESSURE: 82 MMHG | HEART RATE: 89 BPM | SYSTOLIC BLOOD PRESSURE: 147 MMHG | RESPIRATION RATE: 16 BRPM | OXYGEN SATURATION: 97 % | BODY MASS INDEX: 20.09 KG/M2 | WEIGHT: 125 LBS | HEIGHT: 66 IN | TEMPERATURE: 97.9 F

## 2025-01-10 DIAGNOSIS — N18.6 ESRD ON DIALYSIS (HCC): ICD-10-CM

## 2025-01-10 DIAGNOSIS — S29.012A UPPER BACK STRAIN, INITIAL ENCOUNTER: ICD-10-CM

## 2025-01-10 DIAGNOSIS — M51.34 DEGENERATIVE DISC DISEASE, THORACIC: ICD-10-CM

## 2025-01-10 DIAGNOSIS — E16.2 HYPOGLYCEMIA: ICD-10-CM

## 2025-01-10 DIAGNOSIS — R55 SYNCOPE, UNSPECIFIED SYNCOPE TYPE: ICD-10-CM

## 2025-01-10 DIAGNOSIS — Z99.2 ESRD ON DIALYSIS (HCC): ICD-10-CM

## 2025-01-10 DIAGNOSIS — S09.8XXA BLUNT HEAD TRAUMA, INITIAL ENCOUNTER: ICD-10-CM

## 2025-01-10 LAB
ALBUMIN SERPL BCP-MCNC: 3.4 G/DL (ref 3.2–4.9)
ALBUMIN/GLOB SERPL: 0.8 G/DL
ALP SERPL-CCNC: 654 U/L (ref 30–99)
ALT SERPL-CCNC: 43 U/L (ref 2–50)
ANION GAP SERPL CALC-SCNC: 13 MMOL/L (ref 7–16)
AST SERPL-CCNC: 74 U/L (ref 12–45)
BASOPHILS # BLD AUTO: 1 % (ref 0–1.8)
BASOPHILS # BLD: 0.11 K/UL (ref 0–0.12)
BILIRUB SERPL-MCNC: 0.5 MG/DL (ref 0.1–1.5)
BUN SERPL-MCNC: 35 MG/DL (ref 8–22)
CALCIUM ALBUM COR SERPL-MCNC: 8.8 MG/DL (ref 8.5–10.5)
CALCIUM SERPL-MCNC: 8.3 MG/DL (ref 8.5–10.5)
CHLORIDE SERPL-SCNC: 95 MMOL/L (ref 96–112)
CO2 SERPL-SCNC: 25 MMOL/L (ref 20–33)
CREAT SERPL-MCNC: 3.38 MG/DL (ref 0.5–1.4)
EKG IMPRESSION: NORMAL
EOSINOPHIL # BLD AUTO: 0.11 K/UL (ref 0–0.51)
EOSINOPHIL NFR BLD: 1 % (ref 0–6.9)
ERYTHROCYTE [DISTWIDTH] IN BLOOD BY AUTOMATED COUNT: 53.1 FL (ref 35.9–50)
GFR SERPLBLD CREATININE-BSD FMLA CKD-EPI: 14 ML/MIN/1.73 M 2
GLOBULIN SER CALC-MCNC: 4.5 G/DL (ref 1.9–3.5)
GLUCOSE SERPL-MCNC: 200 MG/DL (ref 65–99)
HCT VFR BLD AUTO: 35.4 % (ref 37–47)
HGB BLD-MCNC: 11.4 G/DL (ref 12–16)
IMM GRANULOCYTES # BLD AUTO: 0.05 K/UL (ref 0–0.11)
IMM GRANULOCYTES NFR BLD AUTO: 0.5 % (ref 0–0.9)
LYMPHOCYTES # BLD AUTO: 0.87 K/UL (ref 1–4.8)
LYMPHOCYTES NFR BLD: 8.2 % (ref 22–41)
MCH RBC QN AUTO: 30.6 PG (ref 27–33)
MCHC RBC AUTO-ENTMCNC: 32.2 G/DL (ref 32.2–35.5)
MCV RBC AUTO: 95.2 FL (ref 81.4–97.8)
MONOCYTES # BLD AUTO: 0.75 K/UL (ref 0–0.85)
MONOCYTES NFR BLD AUTO: 7 % (ref 0–13.4)
NEUTROPHILS # BLD AUTO: 8.78 K/UL (ref 1.82–7.42)
NEUTROPHILS NFR BLD: 82.3 % (ref 44–72)
NRBC # BLD AUTO: 0 K/UL
NRBC BLD-RTO: 0 /100 WBC (ref 0–0.2)
PLATELET # BLD AUTO: 194 K/UL (ref 164–446)
PMV BLD AUTO: 10.3 FL (ref 9–12.9)
POTASSIUM SERPL-SCNC: 4.3 MMOL/L (ref 3.6–5.5)
PROT SERPL-MCNC: 7.9 G/DL (ref 6–8.2)
RBC # BLD AUTO: 3.72 M/UL (ref 4.2–5.4)
SODIUM SERPL-SCNC: 133 MMOL/L (ref 135–145)
TROPONIN T SERPL-MCNC: 65 NG/L (ref 6–19)
WBC # BLD AUTO: 10.7 K/UL (ref 4.8–10.8)

## 2025-01-10 PROCEDURE — 700102 HCHG RX REV CODE 250 W/ 637 OVERRIDE(OP): Performed by: EMERGENCY MEDICINE

## 2025-01-10 PROCEDURE — 85025 COMPLETE CBC W/AUTO DIFF WBC: CPT

## 2025-01-10 PROCEDURE — 36415 COLL VENOUS BLD VENIPUNCTURE: CPT

## 2025-01-10 PROCEDURE — 70450 CT HEAD/BRAIN W/O DYE: CPT

## 2025-01-10 PROCEDURE — 72125 CT NECK SPINE W/O DYE: CPT

## 2025-01-10 PROCEDURE — A9270 NON-COVERED ITEM OR SERVICE: HCPCS | Performed by: EMERGENCY MEDICINE

## 2025-01-10 PROCEDURE — 84484 ASSAY OF TROPONIN QUANT: CPT

## 2025-01-10 PROCEDURE — 93005 ELECTROCARDIOGRAM TRACING: CPT | Mod: TC

## 2025-01-10 PROCEDURE — 99284 EMERGENCY DEPT VISIT MOD MDM: CPT

## 2025-01-10 PROCEDURE — 72128 CT CHEST SPINE W/O DYE: CPT

## 2025-01-10 PROCEDURE — 80053 COMPREHEN METABOLIC PANEL: CPT

## 2025-01-10 PROCEDURE — 93005 ELECTROCARDIOGRAM TRACING: CPT | Mod: TC | Performed by: EMERGENCY MEDICINE

## 2025-01-10 RX ORDER — OXYCODONE HYDROCHLORIDE 15 MG/1
15 TABLET, FILM COATED, EXTENDED RELEASE ORAL 2 TIMES DAILY PRN
Qty: 10 TABLET | Refills: 0 | Status: SHIPPED | OUTPATIENT
Start: 2025-01-10 | End: 2025-01-15

## 2025-01-10 RX ADMIN — OXYCODONE 15 MG: 5 TABLET ORAL at 11:14

## 2025-01-10 ASSESSMENT — FIBROSIS 4 INDEX: FIB4 SCORE: 2.51

## 2025-01-10 ASSESSMENT — PAIN DESCRIPTION - DESCRIPTORS: DESCRIPTORS: ACHING

## 2025-01-10 ASSESSMENT — PAIN DESCRIPTION - PAIN TYPE: TYPE: ACUTE PAIN

## 2025-01-10 NOTE — ED TRIAGE NOTES
"Chief Complaint   Patient presents with    Syncope     Pt had an syncope episode last night about 3 hours ago, +head strike, reports she has two bumps in the back of her head. +LOC, -thinners/ASA. Hx of syncopal episodes in the past, pt states last time she fracture her pelvis, about 8 months ago.     Back Pain     Mid back pain and in between shoulder blades. Pt states the pain radiates on the L arm. Pt does have a fistula they placed, but pt states it does not work and that L arm is very pain. Hx HD, Mon/wed/fri.     Arm Pain     L arm pain       69 y.o. female WC to triage for above complaint. Pt reports she took a ibuprofen 200 mg PTA. Pt states the pain went from 10/10 to 7/10. Pt is a GCS 15. Pt reports she thinks she broke her back.     Pt is alert and oriented, speaking in full sentences, follows commands and responds appropriately to questions. NAD. Resp are even and unlabored.      Pt placed in lobby. Pt educated on triage process. Pt encouraged to alert staff for any changes.     Patient and staff wearing appropriate PPE    /75   Pulse 84   Temp 35.9 °C (96.6 °F) (Temporal)   Resp 19   Ht 1.676 m (5' 6\")   Wt 56.7 kg (125 lb)   LMP 04/29/2005 (LMP Unknown)   SpO2 97%   BMI 20.18 kg/m²    "

## 2025-01-10 NOTE — PROGRESS NOTES
In-Home Palliative Medicine Evaluation      Anu Manuel  69 y.o.  Female  MRN [id number]  PCP ZO[facility] Maxwell  Referral Source: ZO[facility] Maxwell  Location: Asha's private home    Reason for palliative medicine consultation and/or visit: advance care planning and symptom management    Assessment and Plan:    Summary: Met with Asha in her home with her family including her daughters Farzaneh and Rabia, her niece Merlyn, her son-in-law, and brother Morgan.  Asha is a 69 year old with an extensive medical history including DM Type 1 (since age 34 years old), ESRD undergoing dialysis, COPD, and liver failure.  She presents today at home post hospitalization for severe acute on chronic mid-back and shoulder pain.  Unfortunately Asha was a patient of either [facility] or [facility] until December where she was receiving 90mg of oxycodone daily to support pain management for over a year.  Since December she has had several ER visits with three resulting in hospitalizations to treat pain and CKD related symptoms.  Today she states that she wants to begin hospice, but upon further assessment and discussion, the plan is to engage the family in helping the patient become more compliant with her current medication regimen and care, as it's clear that she requires this support. [facility] will support Asha in her pain management needs with the agreement that she will remain compliant with other care agencies including home health and dialysis.      Primary diagnosis: End Stage Renal Disease Active Dialysis patient with chronic pain    Physical aspects of care:  PPS 40%  Current smoker  Fall Risk  Pain - severe from right arm and radiates to  mid-back.  Tylenol ineffective, taking robaxin with mild relief.  Oxycodone effective when she has it.  Type 1 DM - uncontrolled, taking lantus at night around 11:30 and humalog as needed with no true sliding scale followed.  Dialysis - 3 x week,  "unable to get there without family help.  Falls- possibly due to near syncope events secondary to hypoglycemia  New orders/recommendations:  Follow-up with admitting to home health services  Oxycodone 10mg every 4 hours as needed  Continue Dialysis    Psychological aspects of care:  Life long history of anxiety and depression.  [place]  in , along with her brother and mother.  Asha has been on a hebert decline since.   Complex family dynamics with existing family.    Social aspects of care:  Lives alone with niece Merlyn living next door.  Has local family including her brother Morgan, her daughter Farzaneh and Rabia with their children also living in town.    Spiritual aspects of care:  Has personal spiritual beliefs    Goals of care:  Today Asha shows that she holds clarity of mind, so also holds capacity to make her own medical decisions.  She states that her primary goal is to have pain controlled. From there she would like to get life \"back on track,\" and avoid hospitalizations.  Her goals include spending quality time with her children.    Advance care planning:  Current POLST and DPOA uploaded into EMR stating DNR, Selective Treatment.    The patient and/or legal decision maker has provided voluntary consent to discuss advance care planning. We discussed current condition and hpi, trajectory of illness, POLST, DNR, Hospitalization, dialysis and hospice. Total time spent in ACP discussion 50 minutes, which is separate from the time spent completing the evaluation and management visit.     Pertinent Physical Exam Findings:  Vital Signs: deferred  Constitutional: thin, ill appearing  HEENT: very hoarse voice  Neck: deferred  Pulm/Chest: equal rise  CVS: mild BLEE  Abdomen: slightly distended  : deferred  MSK: global weakness  Neuro: awake, alert, oriented to person, place, time and situation  Skin: grayish, intact, warm  Psych: slightly agitated, cooperative    Other Pertinent Medical History OR " Surgery Not Listed Above:   Anemia, Liver Failure, COPD, Depression    Current Medications:    Current Outpatient Medications:     acetaminophen (TYLENOL) 500 MG Tab, Take 2 Tablets by mouth in the morning, at noon, and at bedtime., Disp: , Rfl:     dexamethasone (DECADRON) 4 MG Tab, Take 1 Tablet by mouth 2 times a day for 6 days., Disp: 12 Tablet, Rfl: 0    DULoxetine (CYMBALTA) 30 MG Cap DR Particles, Take 1 Capsule by mouth every day., Disp: 30 Capsule, Rfl: 0    insulin glargine (LANTUS SOLOSTAR) 100 UNIT/ML Solution Pen-injector injection, Inject 5 Units under the skin every evening., Disp: 3 mL, Rfl: 0    methocarbamol (ROBAXIN) 750 MG Tab, Take 1 Tablet by mouth 4 times a day as needed (muscle / bone pain)., Disp: 120 Tablet, Rfl: 0    omeprazole (PRILOSEC) 20 MG delayed-release capsule, Take 1 Capsule by mouth every day., Disp: 30 Capsule, Rfl: 0    Insulin Pen Needle 32 G x 4 mm, Use one pen needle in pen device to inject insulin once daily., Disp: 100 Each, Rfl: 0    cyanocobalamin (VITAMIN B-12) 500 MCG Tab, Take 500 mcg by mouth every day., Disp: , Rfl:     ursodiol (ACTIGALL) 300 MG Cap, Take 300 mg by mouth 3 times a day., Disp: , Rfl:     vitamin D3 (CHOLECALCIFEROL) 1000 Unit (25 mcg) Tab, Take 1,000 Units by mouth 2 times a day., Disp: , Rfl:     lidocaine (LIDODERM) 5 % Patch, Place 1 Patch on the skin every 24 hours., Disp: 10 Patch, Rfl: 0    insulin lispro 100 UNIT/ML SC SOPN injection PEN, Inject 0-9 Units under the skin 3 times a day before meals. 70 - 150 mg/dL = 0 Units 151 - 200 mg/dL = 2 Units 201 - 250 mg/dL = 3 Units 251 - 300 mg/dL = 5 Units 301 - 350 mg/dL = 6 Units 351 - 400 mg/dL = 8 Units Over 400 mg/dL = 9 Units, Disp: 15 mL, Rfl: 0    oxycodone (OXY-IR) 15 MG immediate release tablet, Take 1 Tablet by mouth every four hours as needed for Severe Pain for up to 7 days., Disp: 20 Tablet, Rfl: 0    sevelamer (RENAGEL) 800 MG Tab, Take 800 mg by mouth 3 times a day with meals., Disp:  , Rfl:     pantoprazole (PROTONIX) 40 MG Tablet Delayed Response, Take 1 Tablet by mouth every day., Disp: 30 Tablet, Rfl: 0    famotidine (PEPCID) 20 MG Tab, TAKE 1 TABLET BY MOUTH TWICE A DAY (Patient taking differently: Take 20 mg by mouth 2 times a day.), Disp: 180 Tablet, Rfl: 3    atorvastatin (LIPITOR) 40 MG Tab, TAKE 1 TABLET BY MOUTH EVERY DAY IN THE EVENING, Disp: 100 Tablet, Rfl: 0    lidocaine-prilocaine (EMLA) 2.5-2.5 % Cream, Apply 1 g topically as needed (AVF prior to dialysis). APPLY TO AFFECTED AREA AVF ACCESS 30-60 MINS PRIOR TO DIALYSIS TREATMENT WRAP IN GRACE WRAP, Disp: , Rfl:     gabapentin (NEURONTIN) 100 MG Cap, Take 1 Capsule by mouth every Monday, Wednesday, and Friday., Disp: 30 Capsule, Rfl: 0    Continuous Glucose Sensor (FREESTYLE PARISA 3 PLUS SENSOR) Misc, 1 Each every 14 days., Disp: 2 Each, Rfl: 11    metoprolol SR (TOPROL XL) 100 MG TABLET SR 24 HR, Take 100 mg by mouth every evening., Disp: , Rfl:     SYNTHROID 125 MCG Tab, Take 2 Tablets by mouth every morning on an empty stomach., Disp: 180 Tablet, Rfl: 2    amLODIPine (NORVASC) 10 MG Tab, Take 10 mg by mouth every day. , Disp: , Rfl:     senna-docusate (SENOKOT S) 8.6-50 MG Tab, Take 1 Tablet by mouth every day. , Disp: , Rfl:     venlafaxine (EFFEXOR-XR) 150 MG extended-release capsule, Take 150 mg by mouth every day. , Disp: , Rfl:     liothyronine (CYTOMEL) 5 MCG Tab, Take 1 Tablet by mouth every day., Disp: 90 Tablet, Rfl: 3    traZODone (DESYREL) 150 MG Tab, Take 150 mg by mouth at bedtime. , Disp: , Rfl:     Medication Allergies:  Tape    Thank you for allowing me the opportunity to participate in the care of Anu Spaulding Kaleb    I spent a total of 180 minutes reviewing medical records, direct face-to-face time with the patient and/or family, documentation and coordination of care. This is separate from the time spent on advance care planning, which is documented above.    Jennifer DELGADO  [facility]  [facility]   P - [phone]   C - [phone]

## 2025-01-10 NOTE — ED PROVIDER NOTES
ER Provider Note    Scribed for Erlin Mendoza M.D. by Elsa Castellanos. 1/10/2025   9:05 AM    Primary Care Provider: ZOË Maxwell    CHIEF COMPLAINT  Chief Complaint   Patient presents with    Syncope     Pt had an syncope episode last night about 3 hours ago, +head strike, reports she has two bumps in the back of her head. +LOC, -thinners/ASA. Hx of syncopal episodes in the past, pt states last time she fracture her pelvis, about 8 months ago.     Back Pain     Mid back pain and in between shoulder blades. Pt states the pain radiates on the L arm. Pt does have a fistula they placed, but pt states it does not work and that L arm is very pain. Hx HD, Mon/wed/fri.     Arm Pain     L arm pain     EXTERNAL RECORDS REVIEWED  Other Patient has a history of renal disease and is currently on dialysis.    HPI/ROS  LIMITATION TO HISTORY   Select: : None  OUTSIDE HISTORIAN(S):  None    Anu Manuel is a 69 y.o. female who presents to the ED for evaluation of left upper arm pain, center back pain onset earlier this morning. The patient states that she woke up with morning with low blood sugar levels. She reports that she tried to stand up and had a syncopal event, causing her to suffer a ground level fall. The patient notes that she did hit her head, adding that she has two bumps present on her head. She states that she landed on her left side during the fall and was unable to initially get off of the floor. The patient reports that she was eventually able to stand after the fall and reported to the ED for further evaluation. She notes that she had difficulty moving her left arm, adding that she had a clotted AV fistula in her left arm. The patient denies any neck pain. She states that she has been having back pain for the past few days prior to onset, but notes that after her fall her back pain has increased in severity. She adds that she had taken Ibuprofen which helped temporarily alleviate her  "pain. The patient reports that she had undergone an MRI on her back in the past. She notes that she is currently on dialysis and was supposed to receive dialysis this morning but missed her appointment due to reporting to the ED. The patient adds that her last dialysis was three days ago. She states that she suffered a fall in the past which resulted in a pelvis fracture.       PAST MEDICAL HISTORY  Past Medical History:   Diagnosis Date    Accidental drug overdose 08/27/2012    Adverse effect of anesthesia     in 2008 \"throat closes up\"\"anxiety\" ?laryngospasm, kept in ICU. Pt states no problems currently 2018.     Anemia     Anesthesia     in 2008 \"throat closes up\"\"anxiety\" ?laryngospasm, kept in ICU. Pt states no problems currently 2018.     Arrhythmia     A FIB    Arthritis     right shoulder, hands, OSTEO    Bowel habit changes More frequent    Breath shortness     Broken hip (HCC) 04/2024    Broken hip possibly the pelvis.  Inoperable    Cataract 2022    BILAT IOL    Cigarette smoker quit 2013    Dental disorder     upper denture    depression 08/30/2016    denies depression, states has anxiety and panic attacks    Diabetes mellitus type 1 (HCC) 1989    insulin    Dialysis patient (HCC) Short time due to a kidney injury from a infection    DIALYSIS, [facility]    Emphysema of lung (HCC)     COPD    Encounter for long-term (current) use of insulin (HCC) 09/25/2013    GI bleed     Heart burn     High cholesterol     Hypertension     Hypothyroidism, postsurgical 1970    Indigestion     Infectious disease      had hepatitis C, tested neg.    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) 06/10/2015    Vertigo        SURGICAL HISTORY  Past Surgical History:   Procedure Laterality Date    AV FISTULA REVISION Left 12/5/2024    Procedure: LIGATION OF LEFT UPPER ARM DIALYSIS GRAFT;  Surgeon: Denis Brown M.D.;  Location: SUR[facility]ERY [facility];  Service: General    PB REMOVE PERM CANNULA/CATHETER  09/23/2024    Procedure: REMOVAL OF PERITONEAL DIALYSIS CATHETER;  Surgeon: Denis Brown M.D.;  Location: SURGERY [facility];  Service: General    AV FISTULA CREATION Left 09/23/2024    Procedure: CREATION OF LEFT UPPER EXTREMITY DIALYSIS GRAFT;  Surgeon: Denis Brown M.D.;  Location: SURGERY [facility];  Service: General    PB LAP INSERT INTRAPERITONEAL CATHETER  06/20/2024    Procedure: LAPAROSCOPIC INSERTION OF PERITONEAL DIALYSIS CATHETER;  Surgeon: Denis Brown M.D.;  Location: [facility];  Service: General    IA ERCP,DIAGNOSTIC N/A 01/14/2022    Procedure: ERCP, DIAGNOSTIC - WITH SIGMOID COLON BIOPSY AND STENT REMOVAL;  Surgeon: Cr Haro M.D.;  Location: [facility];  Service: Gastroenterology    THADDEUS BY LAPAROSCOPY N/A 10/28/2021    Procedure: CHOLECYSTECTOMY, LAPAROSCOPIC;  Surgeon: Tello Trammell M.D.;  Location: [facility];  Service: General    IA ERCP,DIAGNOSTIC N/A 10/26/2021    Procedure: ERCP (ENDOSCOPIC RETROGRADE CHOLANGIOPANCREATOGRAPHY);  Surgeon: Cr Haro M.D.;  Location: SURGERY SAME DAY [facility];  Service: Gastroenterology    IA UPPER GI ENDOSCOPY,BIOPSY N/A 10/26/2021    Procedure: GASTROSCOPY, WITH BIOPSY;  Surgeon: Cr Haro M.D.;  Location: SURGERY SAME DAY [facility];  Service: Gastroenterology    IA UPPER GI ENDOSCOPY,DIAGNOSIS N/A 10/26/2021    Procedure: GASTROSCOPY;  Surgeon: Cr Haro M.D.;  Location: SURGERY SAME DAY [facility];  Service: Gastroenterology    CATH PLACEMENT  01/25/2020    Procedure: INSERTION, CATHETER PERM;  Surgeon:  Rola Mendoza M.D.;  Location: [facility];  Service: General    IRRIGATION & DEBRIDEMENT NEURO  01/19/2020    Procedure: IRRIGATION AND DEBRIDEMENT, WOUND THORACIC AND LUMBAR;  Surgeon: Ryan Roman M.D.;  Location: [facility];  Service: Neurosurgery    NE INS/RPLC SPI NPG/RCVR POCKET N/A 12/16/2019    Procedure: EXPLORATION AT THORACIC 8 - 9, REPLACEMENT OF  NEUROSTIMULATOR LEAD;  Surgeon: Ryan Roman M.D.;  Location: SUR[facility]ERY [facility];  Service: Neurosurgery    SPINAL CORD STIMULATOR N/A 10/26/2018    Procedure: SPINAL CORD STIMULATOR;  Surgeon: Ryan Roman M.D.;  Location: SUR[facility]ERY [facility];  Service: Neurosurgery    THORACIC LAMINECTOMY N/A 10/26/2018    Procedure: THORACIC LAMINECTOMY - FOR;  Surgeon: Ryan Roman M.D.;  Location: [facility];  Service: Neurosurgery    NE NJX AA&/STRD TFRML EPI LUMBAR/SACRAL 1 LEVEL Right 08/31/2016    Procedure: INJ-FORAMEN EPI LUM/SAC SNGL L4-5;  Surgeon: Sukhi Godfrey M.D.;  Location: [facility];  Service: Pain Management    LUMBAR LAMINECTOMY DISKECTOMY Right 05/10/2016    Procedure: LUMBAR L4-5 HEMILAMINECTOMY DISKECTOMY ;  Surgeon: Arnold Keyes M.D.;  Location: [facility];  Service:     CERVICAL FUSION POSTERIOR  01/16/2009    Performed by TARA CONTRERAS at [facility]    HARDWARE REMOVAL NEURO  01/16/2009    Performed by TARA CONTRERAS at [facility]    NECK EXPLORATION  01/16/2009    Performed by TARA CONTRREAS at [facility]    SHOULDER ARTHROSCOPY W/ ROTATOR CUFF REPAIR  10/09/2008    Performed by SHERLY CASTANEDA at [facility]    SHOULDER DECOMPRESSION ARTHROSCOPIC  06/17/2008    Performed by SHERLY CASTANEDA at [facility]    CLAVICLE DISTAL EXCISION  06/17/2008    Performed by SHERLY CASTANEDA at [facility]    CERVICAL DISK AND FUSION ANTERIOR  03/12/2008    HYSTERECTOMY, VAGINAL  2006     PARTIAL    APPENDECTOMY  2004    THYROID LOBECTOMY  1973    TONSILLECTOMY  1963    CATARACT PHACO WITH IOL      LUMPECTOMY  1976, 2005    Breast     LUMPECTOMY      OTHER ABDOMINAL SURGERY  2004    OTHER CARDIAC SURGERY  2020 stents inserted       FAMILY HISTORY  Family History   Problem Relation Age of Onset    Hypertension Mother     Cancer Father        SOCIAL HISTORY   reports that she has been smoking cigarettes. She has a 15 pack-year smoking history. She has never used smokeless tobacco. She reports that she does not currently use alcohol. She reports that she does not currently use drugs after having used the following drugs: Inhaled, Oral, and Marijuana.    CURRENT MEDICATIONS  Previous Medications    ACETAMINOPHEN (TYLENOL) 500 MG TAB    Take 2 Tablets by mouth in the morning, at noon, and at bedtime.    AMLODIPINE (NORVASC) 10 MG TAB    Take 10 mg by mouth every day.       ATORVASTATIN (LIPITOR) 40 MG TAB    TAKE 1 TABLET BY MOUTH EVERY DAY IN THE EVENING    CONTINUOUS GLUCOSE SENSOR (FREESTYLE PARISA 3 PLUS SENSOR) MISC    1 Each every 14 days.    CYANOCOBALAMIN (VITAMIN B-12) 500 MCG TAB    Take 500 mcg by mouth every day.    DEXAMETHASONE (DECADRON) 4 MG TAB    Take 1 Tablet by mouth 2 times a day for 6 days.    DULOXETINE (CYMBALTA) 30 MG CAP DR PARTICLES    Take 1 Capsule by mouth every day.    FAMOTIDINE (PEPCID) 20 MG TAB    TAKE 1 TABLET BY MOUTH TWICE A DAY    GABAPENTIN (NEURONTIN) 100 MG CAP    Take 1 Capsule by mouth every Monday, Wednesday, and Friday.    INSULIN GLARGINE (LANTUS SOLOSTAR) 100 UNIT/ML SOLUTION PEN-INJECTOR INJECTION    Inject 5 Units under the skin every evening.    INSULIN LISPRO 100 UNIT/ML SC SOPN INJECTION PEN    Inject 0-9 Units under the skin 3 times a day before meals. 70 - 150 mg/dL = 0 Units 151 - 200 mg/dL = 2 Units 201 - 250 mg/dL = 3 Units 251 - 300 mg/dL = 5 Units 301 - 350 mg/dL = 6 Units 351 - 400 mg/dL = 8 Units Over 400 mg/dL = 9 Units    INSULIN PEN NEEDLE 32  "G X 4 MM    Use one pen needle in pen device to inject insulin once daily.    LIDOCAINE (LIDODERM) 5 % PATCH    Place 1 Patch on the skin every 24 hours.    LIDOCAINE-PRILOCAINE (EMLA) 2.5-2.5 % CREAM    Apply 1 g topically as needed (AVF prior to dialysis). APPLY TO AFFECTED AREA AVF ACCESS 30-60 MINS PRIOR TO DIALYSIS TREATMENT WRAP IN SARAN WRAP    LIOTHYRONINE (CYTOMEL) 5 MCG TAB    Take 1 Tablet by mouth every day.    METHOCARBAMOL (ROBAXIN) 750 MG TAB    Take 1 Tablet by mouth 4 times a day as needed (muscle / bone pain).    METOPROLOL SR (TOPROL XL) 100 MG TABLET SR 24 HR    Take 100 mg by mouth every evening.    OMEPRAZOLE (PRILOSEC) 20 MG DELAYED-RELEASE CAPSULE    Take 1 Capsule by mouth every day.    OXYCODONE IMMEDIATE RELEASE (ROXICODONE) 10 MG IMMEDIATE RELEASE TABLET    Take 1 Tablet by mouth every four hours as needed for Moderate Pain for up to 7 days.    PANTOPRAZOLE (PROTONIX) 40 MG TABLET DELAYED RESPONSE    Take 1 Tablet by mouth every day.    SENNA-DOCUSATE (SENOKOT S) 8.6-50 MG TAB    Take 1 Tablet by mouth every day.       SEVELAMER (RENAGEL) 800 MG TAB    Take 800 mg by mouth 3 times a day with meals.    SYNTHROID 125 MCG TAB    Take 2 Tablets by mouth every morning on an empty stomach.    TRAZODONE (DESYREL) 150 MG TAB    Take 150 mg by mouth at bedtime.       URSODIOL (ACTIGALL) 300 MG CAP    Take 300 mg by mouth 3 times a day.    VENLAFAXINE (EFFEXOR-XR) 150 MG EXTENDED-RELEASE CAPSULE    Take 150 mg by mouth every day.       VITAMIN D3 (CHOLECALCIFEROL) 1000 UNIT (25 MCG) TAB    Take 1,000 Units by mouth 2 times a day.       ALLERGIES  Allergies   Allergen Reactions    Tape Unspecified and Rash     Blisters, paper tape is ok  Other reaction(s): Rash        PHYSICAL EXAM  BP (!) 186/89   Pulse 79   Temp 35.9 °C (96.6 °F) (Temporal)   Resp 19   Ht 1.676 m (5' 6\")   Wt 56.7 kg (125 lb)   LMP 04/29/2005 (LMP Unknown)   SpO2 97%   BMI 20.18 kg/m²      Nursing note and vitals " reviewed.  Constitutional: Well-developed and well-nourished. No distress.   HENT: Head is normocephalic and atraumatic. Oropharynx is clear and moist without exudate or erythema.   Eyes: Pupils are equal, round, and reactive to light. Conjunctiva are normal.   Cardiovascular: Normal rate and regular rhythm. No murmur heard. Normal radial pulses.  Pulmonary/Chest: Breath sounds normal. No wheezes or rales.   Abdominal: Soft and non-tender. No distention    Back: Tenderness along the thoracic spine. No deformities.   Musculoskeletal: Left upper extremity has a clotted AV fistula which is chronic. No bony tenderness. Able to actively range the left shoulder.   Neurological: Awake, alert and oriented to person, place, and time. No focal deficits noted.  Skin: Skin is warm and dry. No rash.   Psychiatric: Normal mood and affect. Appropriate for clinical situation    DIAGNOSTIC STUDIES    Labs:   Results for orders placed or performed during the hospital encounter of 01/10/25   EKG    Collection Time: 01/10/25  9:11 AM   Result Value Ref Range    Report       [facility] Emergency Dept.    Test Date:  2025-01-10  Pt Name:    KALPANA BUTTS               Department: ER  MRN:        [id number]                      Room:  Gender:     Female                       Technician: [id number]  :        [id number]                   Requested By:ER TRIAGE PROTOCOL  Order #:    [id number]                    Reading MD: VALE GILBERT MD    Measurements  Intervals                                Axis  Rate:       76                           P:          57  NH:         154                          QRS:        15  QRSD:       93                           T:          36  QT:         398  QTc:        448    Interpretive Statements  Sinus rhythm  Left atrial enlargement  Compared to ECG 2025 10:32:48  Atrial abnormality now present  Sinus bradycardia no longer present  T-wave abnormality no longer  present  Electronically Signed On 01- 09:11:23 PST by VALE GILBERT MD     CBC WITH DIFFERENTIAL    Collection Time: 01/10/25  9:24 AM   Result Value Ref Range    WBC 10.7 4.8 - 10.8 K/uL    RBC 3.72 (L) 4.20 - 5.40 M/uL    Hemoglobin 11.4 (L) 12.0 - 16.0 g/dL    Hematocrit 35.4 (L) 37.0 - 47.0 %    MCV 95.2 81.4 - 97.8 fL    MCH 30.6 27.0 - 33.0 pg    MCHC 32.2 32.2 - 35.5 g/dL    RDW 53.1 (H) 35.9 - 50.0 fL    Platelet Count 194 164 - 446 K/uL    MPV 10.3 9.0 - 12.9 fL    Neutrophils-Polys 82.30 (H) 44.00 - 72.00 %    Lymphocytes 8.20 (L) 22.00 - 41.00 %    Monocytes 7.00 0.00 - 13.40 %    Eosinophils 1.00 0.00 - 6.90 %    Basophils 1.00 0.00 - 1.80 %    Immature Granulocytes 0.50 0.00 - 0.90 %    Nucleated RBC 0.00 0.00 - 0.20 /100 WBC    Neutrophils (Absolute) 8.78 (H) 1.82 - 7.42 K/uL    Lymphs (Absolute) 0.87 (L) 1.00 - 4.80 K/uL    Monos (Absolute) 0.75 0.00 - 0.85 K/uL    Eos (Absolute) 0.11 0.00 - 0.51 K/uL    Baso (Absolute) 0.11 0.00 - 0.12 K/uL    Immature Granulocytes (abs) 0.05 0.00 - 0.11 K/uL    NRBC (Absolute) 0.00 K/uL   COMP METABOLIC PANEL    Collection Time: 01/10/25  9:24 AM   Result Value Ref Range    Sodium 133 (L) 135 - 145 mmol/L    Potassium 4.3 3.6 - 5.5 mmol/L    Chloride 95 (L) 96 - 112 mmol/L    Co2 25 20 - 33 mmol/L    Anion Gap 13.0 7.0 - 16.0    Glucose 200 (H) 65 - 99 mg/dL    Bun 35 (H) 8 - 22 mg/dL    Creatinine 3.38 (H) 0.50 - 1.40 mg/dL    Calcium 8.3 (L) 8.5 - 10.5 mg/dL    Correct Calcium 8.8 8.5 - 10.5 mg/dL    AST(SGOT) 74 (H) 12 - 45 U/L    ALT(SGPT) 43 2 - 50 U/L    Alkaline Phosphatase 654 (H) 30 - 99 U/L    Total Bilirubin 0.5 0.1 - 1.5 mg/dL    Albumin 3.4 3.2 - 4.9 g/dL    Total Protein 7.9 6.0 - 8.2 g/dL    Globulin 4.5 (H) 1.9 - 3.5 g/dL    A-G Ratio 0.8 g/dL   TROPONIN    Collection Time: 01/10/25  9:24 AM   Result Value Ref Range    Troponin T 65 (H) 6 - 19 ng/L   ESTIMATED GFR    Collection Time: 01/10/25  9:24 AM   Result Value Ref Range    GFR  (CKD-EPI) 14 (A) >60 mL/min/1.73 m 2     *Note: Due to a large number of results and/or encounters for the requested time period, some results have not been displayed. A complete set of results can be found in Results Review.       EKG:   I have independently interpreted this EKG as detailed above.     Radiology:   This attending emergency physician has independently interpreted the diagnostic imaging associated with this visit and is awaiting the final reading from the radiologist.   Preliminary interpretation is a follows: CT scan shows degenerative changes but no intracranial hemorrhage or acute spine fracture    Radiologist interpretation:   CT-TSPINE W/O PLUS RECONS   Final Result      No acute fracture or traumatic malalignment.      CT-CSPINE WITHOUT PLUS RECONS   Final Result      Prior interbody and posterior fusion of C4-C7. Sclerosis of the C7-T1 endplates as before, with increased destruction of the intervening disc space since 2019. Please correlate clinically for discitis-osteomyelitis.      CT-HEAD W/O   Final Result         1. No acute intracranial abnormality. No evidence of acute intracranial hemorrhage or mass lesion.                                INITIAL ASSESSMENT AND PLAN    9:05 AM - Patient was evaluated at bedside for left arm pain and back pain after a syncopal event. Discussed the plan of care with the patient including ordering labs and imaging to further evaluate the patient's condition. The patient was able to ask questions at this time. Ordered for CT-Cspine without plus recons, CT-Tspine without plus recons, CT-head without, CBC w/ diff, CMP, Troponin and EKG to evaluate. Patient verbalizes understanding and support with my plan of care.  Differential diagnoses include but not limited to: electrolyte abnormality vs hypoglycemia vs intercranial hemorrhage vs thoracic spine fracture vs arrhythmia.     ED Observation Status? No; Patient does not meet criteria for ED Observation.       COURSE AND MEDICAL DECISION MAKING    10:12 AM - Patient's mobile panel is consistent with end stage renal disease. Potassium level is normal at 43. Glucose is elevated at 200. Troponin is elevated at 65 which is lower than previous times. CT scan degenerative changes but no acute fractures.     10:50 AM - Patient's imaging results showed chronic degenerative changes. Labs and physical exam do not show a need for emergent dialysis.     11:10 AM - Nurse informed me that the patient is requesting pain medication upon discharge. I reviewed the patient's records which showed that she was prescribed a week's amount of Oxycodone and I suspect that she recently ran out of the medication. Patient will be treated with Roxicodone 15 mg.    11:22 AM - Patient's diagnostic study results with the patient and informed them that they will be prescribed medication upon discharge for their pain. Patient is recommended to take the medication as prescribed. Plan for discharge was discussed with the patient as well as follow up as outlined below. The patient is stable for discharge at this time and will return for any new or worsening symptoms. Patient verbalizes understanding and support with my plan for discharge. Patient was discharged by staff.     The patient was treated with IV narcotics for pain control.  Placed on cardiac and blood pressure monitor to monitor for associated hypotension.  Also placed on pulse oximetry to monitor for hypoxia associated with medication administration/side effect.    DISPOSITION AND DISCUSSIONS    I have discussed management of the patient with the following physicians and MILAN's:  None    Discussion of management with other [facility] or appropriate source(s): None     Barriers to care at this time, including but not limited to:  None present .     Decision tools and prescription drugs considered including, but not limited to: Pain Medications Oxycodone 15 mg .    I reviewed prescription monitoring  program for patient's narcotic use before prescribing a scheduled drug.The patient will not drink alcohol nor drive with prescribed medications. The patient will return for new or worsening symptoms and is stable at the time of discharge.    In prescribing controlled substances to this patient, I certify that I have obtained and reviewed the medical history of Anu Manuel. I have also made a good sindhu effort to obtain applicable records from other providers who have treated the patient and records did not demonstrate any increased risk of substance abuse that would prevent me from prescribing controlled substances.     I have conducted a physical exam and documented it. I have reviewed Ms. Manuel’s prescription history as maintained by the [place] Prescription Monitoring Program.     I have assessed the patient’s risk for abuse, dependency, and addiction using the validated Opioid Risk Tool available at https://www.mdcalc.com/zlhcxe-xtpd-olyn-ort-narcotic-abuse.     Given the above, I believe the benefits of controlled substance therapy outweigh the risks. The reasons for prescribing controlled substances include non-narcotic, oral analgesic alternatives have been inadequate for pain control. Accordingly, I have discussed the risk and benefits, treatment plan, and alternative therapies with the patient.       DISPOSITION:  Patient will be discharged home in stable condition.    FOLLOW UP:  BETZAIDA MaxwellN.  [address]  [facility] [id number] [phone]    Schedule an appointment as soon as possible for a visit       [facility], Emergency Dept  [address] [facility] [address] [phone]    If symptoms worsen      OUTPATIENT MEDICATIONS:  New Prescriptions    OXYCODONE HCL ER 15 MG TABLET EXTENDED RELEASE 12 HOUR ABUSE-DETERRENT    Take 15 mg by mouth 2 times a day as needed (pain) for up to 5 days.     FINAL DIAGNOSIS  1. Hypoglycemia    2. Syncope,  unspecified syncope type    3. Upper back strain, initial encounter    4. Degenerative disc disease, thoracic    5. Blunt head trauma, initial encounter    6. ESRD on dialysis (HCC)       IElsa (Scribe), am scribing for, and in the presence of, Erlin Mendoza M.D..    Electronically signed by: Elsa Castellanos (Scribe), 1/10/2025    IErlin M.D. personally performed the services described in this documentation, as scribed by Elsa Castellanos in my presence, and it is both accurate and complete.      The note accurately reflects work and decisions made by me.  Erlin Mendoza M.D.  1/10/2025  1:23 PM

## 2025-01-10 NOTE — ED NOTES
Pt WC to room with all belongings, placed on monitoring, chart up for ERP.   Pt reports she thinks her back is broken due to when she fell yesterday. Pt able to stand and ambulate to [facility]. Call light within reach.

## 2025-01-10 NOTE — ED NOTES
Pt cleared for discharge, VSS on RA. Pt discharged home with written and verbal instructions regarding f/u, activity, reasons to return to ED & RX to  at preferred pharmacy. Verbalized understanding, all questions addressed, WC out to lobby with all belongings, pt has ride home.

## 2025-01-12 ENCOUNTER — HOME CARE VISIT (OUTPATIENT)
Dept: HOME HEALTH SERVICES | Facility: HOME HEALTHCARE | Age: 70
End: 2025-01-12
Payer: MEDICARE

## 2025-01-12 VITALS
OXYGEN SATURATION: 96 % | SYSTOLIC BLOOD PRESSURE: 118 MMHG | HEART RATE: 81 BPM | TEMPERATURE: 97.7 F | RESPIRATION RATE: 16 BRPM | DIASTOLIC BLOOD PRESSURE: 64 MMHG

## 2025-01-12 PROCEDURE — 665001 SOC-HOME HEALTH

## 2025-01-12 PROCEDURE — 665998 HH PPS REVENUE CREDIT

## 2025-01-12 PROCEDURE — G0495 RN CARE TRAIN/EDU IN HH: HCPCS

## 2025-01-12 PROCEDURE — 665999 HH PPS REVENUE DEBIT

## 2025-01-12 ASSESSMENT — ENCOUNTER SYMPTOMS
LOWEST PAIN SEVERITY IN PAST 24 HOURS: 5/10
PAIN LOCATION - EXACERBATING FACTORS: ACTIVITY
DRY SKIN: 1
DESCRIPTION OF MEMORY LOSS: SHORT TERM
FATIGUE: 1
LAST BOWEL MOVEMENT: 67217
HIGHEST PAIN SEVERITY IN PAST 24 HOURS: 10/10
PAIN LOCATION - PAIN SEVERITY: 8/10
PAIN SEVERITY GOAL: 1/10
PAIN LOCATION - RELIEVING FACTORS: REST, MEDS
FORGETFULNESS: 1
PAIN LOCATION: BACK AND LEFT ARM
STOOL FREQUENCY: DAILY
PAIN: 1
VOMITING: DENIES
PAIN LOCATION - PAIN FREQUENCY: CONSTANT
DYSPNEA ACTIVITY LEVEL: AFTER AMBULATING LESS THAN 10 FT
BOWEL PATTERN NORMAL: 1
NAUSEA: DENIES
SUBJECTIVE PAIN PROGRESSION: UNCHANGED
SHORTNESS OF BREATH: 1

## 2025-01-12 ASSESSMENT — ACTIVITIES OF DAILY LIVING (ADL): OASIS_M1830: 05

## 2025-01-13 LAB
BACTERIA BLD CULT: NORMAL
BACTERIA BLD CULT: NORMAL
SIGNIFICANT IND 70042: NORMAL
SIGNIFICANT IND 70042: NORMAL
SITE SITE: NORMAL
SITE SITE: NORMAL
SOURCE SOURCE: NORMAL
SOURCE SOURCE: NORMAL

## 2025-01-13 PROCEDURE — 665998 HH PPS REVENUE CREDIT

## 2025-01-13 PROCEDURE — 665999 HH PPS REVENUE DEBIT

## 2025-01-14 ENCOUNTER — HOME CARE VISIT (OUTPATIENT)
Dept: HOME HEALTH SERVICES | Facility: HOME HEALTHCARE | Age: 70
End: 2025-01-14
Payer: MEDICARE

## 2025-01-14 VITALS
DIASTOLIC BLOOD PRESSURE: 68 MMHG | HEART RATE: 63 BPM | SYSTOLIC BLOOD PRESSURE: 122 MMHG | RESPIRATION RATE: 16 BRPM | OXYGEN SATURATION: 93 % | TEMPERATURE: 98.3 F

## 2025-01-14 PROCEDURE — 665998 HH PPS REVENUE CREDIT

## 2025-01-14 PROCEDURE — G0152 HHCP-SERV OF OT,EA 15 MIN: HCPCS

## 2025-01-14 PROCEDURE — 665999 HH PPS REVENUE DEBIT

## 2025-01-14 PROCEDURE — G0151 HHCP-SERV OF PT,EA 15 MIN: HCPCS

## 2025-01-14 NOTE — DISCHARGE PLANNING
note:  Received an email from BRAD Welch to follow up.    Received a call back from Jennifer Balbuena who said that she was unable to pursue hospice for pt because pt and family wanted to continue with dialysis. Jennifer DELGADO will follow up.

## 2025-01-14 NOTE — Clinical Note
Please send to JOHAN Maxwell   Patient reporting pain level to upper back as 7-9/10 at [facility] evaluation 1/14/25 - this is outside of accepted parameters and requires physician and care team notification

## 2025-01-15 ENCOUNTER — HOME CARE VISIT (OUTPATIENT)
Dept: HOME HEALTH SERVICES | Facility: HOME HEALTHCARE | Age: 70
End: 2025-01-15
Payer: MEDICARE

## 2025-01-15 DIAGNOSIS — Z99.2 ESRD NEEDING DIALYSIS (HCC): ICD-10-CM

## 2025-01-15 DIAGNOSIS — N18.6 ESRD NEEDING DIALYSIS (HCC): ICD-10-CM

## 2025-01-15 DIAGNOSIS — M54.6 ACUTE RIGHT-SIDED THORACIC BACK PAIN: ICD-10-CM

## 2025-01-15 PROCEDURE — G0299 HHS/HOSPICE OF RN EA 15 MIN: HCPCS

## 2025-01-15 PROCEDURE — 665998 HH PPS REVENUE CREDIT

## 2025-01-15 PROCEDURE — 665999 HH PPS REVENUE DEBIT

## 2025-01-15 RX ORDER — OXYCODONE HYDROCHLORIDE 10 MG/1
10 TABLET ORAL EVERY 4 HOURS PRN
Qty: 42 TABLET | Refills: 0 | Status: ON HOLD | OUTPATIENT
Start: 2025-01-15 | End: 2025-01-22

## 2025-01-15 ASSESSMENT — ENCOUNTER SYMPTOMS
PAIN LOCATION - EXACERBATING FACTORS: MOVEMENT, WALKING
PAIN LOCATION: UPPER BACK
PAIN LOCATION - PAIN QUALITY: SHARP, ACHY
PAIN LOCATION - PAIN SEVERITY: 7/10
LOWEST PAIN SEVERITY IN PAST 24 HOURS: 2/10
HIGHEST PAIN SEVERITY IN PAST 24 HOURS: 9/10
PAIN SEVERITY GOAL: 0/10
PAIN LOCATION - PAIN FREQUENCY: WITH ACTIVITY
MUSCLE WEAKNESS: 1
PAIN LOCATION - RELIEVING FACTORS: REST, MEDS
PAIN: 1
DEBILITATING PAIN: 1
SUBJECTIVE PAIN PROGRESSION: WAXING AND WANING

## 2025-01-15 ASSESSMENT — ACTIVITIES OF DAILY LIVING (ADL)
PHYSICAL TRANSFERS ASSESSED: 1
CURRENT_FUNCTION: MINIMUM ASSIST

## 2025-01-16 ENCOUNTER — HOME CARE VISIT (OUTPATIENT)
Dept: HOME HEALTH SERVICES | Facility: HOME HEALTHCARE | Age: 70
End: 2025-01-16
Payer: MEDICARE

## 2025-01-16 PROCEDURE — 665998 HH PPS REVENUE CREDIT

## 2025-01-16 PROCEDURE — 665999 HH PPS REVENUE DEBIT

## 2025-01-17 ENCOUNTER — PATIENT OUTREACH (OUTPATIENT)
Dept: HEALTH INFORMATION MANAGEMENT | Facility: OTHER | Age: 70
End: 2025-01-17
Payer: MEDICARE

## 2025-01-17 ENCOUNTER — HOME CARE VISIT (OUTPATIENT)
Dept: HOME HEALTH SERVICES | Facility: HOME HEALTHCARE | Age: 70
End: 2025-01-17
Payer: MEDICARE

## 2025-01-17 VITALS
TEMPERATURE: 97.9 F | RESPIRATION RATE: 16 BRPM | OXYGEN SATURATION: 97 % | SYSTOLIC BLOOD PRESSURE: 130 MMHG | DIASTOLIC BLOOD PRESSURE: 80 MMHG | HEART RATE: 74 BPM

## 2025-01-17 DIAGNOSIS — Z99.2 ESRD NEEDING DIALYSIS (HCC): ICD-10-CM

## 2025-01-17 DIAGNOSIS — N18.6 ESRD NEEDING DIALYSIS (HCC): ICD-10-CM

## 2025-01-17 DIAGNOSIS — E10.9 TYPE 1 DIABETES MELLITUS ON INSULIN THERAPY (HCC): ICD-10-CM

## 2025-01-17 PROCEDURE — 665999 HH PPS REVENUE DEBIT

## 2025-01-17 PROCEDURE — 665998 HH PPS REVENUE CREDIT

## 2025-01-17 PROCEDURE — G0152 HHCP-SERV OF OT,EA 15 MIN: HCPCS

## 2025-01-17 ASSESSMENT — ENCOUNTER SYMPTOMS
LOWEST PAIN SEVERITY IN PAST 24 HOURS: 3/10
SUBJECTIVE PAIN PROGRESSION: WAXING AND WANING
PAIN LOCATION - EXACERBATING FACTORS: STANDING
POOR JUDGMENT: 1
PAIN SEVERITY GOAL: 0/10
PAIN LOCATION: BACK
PAIN: 1
SEVERE DYSPNEA: 1
DEBILITATING PAIN: 1
PAIN LOCATION - RELIEVING FACTORS: LYING DOWN
DEPRESSED MOOD: 1
PAIN LOCATION - PAIN SEVERITY: 3/10
HIGHEST PAIN SEVERITY IN PAST 24 HOURS: 8/10

## 2025-01-18 PROCEDURE — 665998 HH PPS REVENUE CREDIT

## 2025-01-18 PROCEDURE — 665999 HH PPS REVENUE DEBIT

## 2025-01-19 VITALS
OXYGEN SATURATION: 95 % | HEART RATE: 74 BPM | SYSTOLIC BLOOD PRESSURE: 120 MMHG | RESPIRATION RATE: 16 BRPM | DIASTOLIC BLOOD PRESSURE: 70 MMHG | TEMPERATURE: 97.4 F

## 2025-01-19 PROCEDURE — 665999 HH PPS REVENUE DEBIT

## 2025-01-19 PROCEDURE — 665998 HH PPS REVENUE CREDIT

## 2025-01-19 ASSESSMENT — ENCOUNTER SYMPTOMS
LOWEST PAIN SEVERITY IN PAST 24 HOURS: 7/10
PAIN SEVERITY GOAL: 0/10
PAIN: 1
PAIN LOCATION - PAIN SEVERITY: 7/10
DEBILITATING PAIN: 1
PAIN LOCATION: BACK
FATIGUES EASILY: 1
LOWER EXTREMITY EDEMA: 1
HIGHEST PAIN SEVERITY IN PAST 24 HOURS: 7/10
SUBJECTIVE PAIN PROGRESSION: GRADUALLY IMPROVING
NAUSEA: DENIES ANY
LAST BOWEL MOVEMENT: 67219
MUSCLE WEAKNESS: 1
STOOL FREQUENCY: LESS THAN DAILY
PAIN LOCATION - EXACERBATING FACTORS: WALKING STANDING
BOWEL PATTERN NORMAL: 1
PAIN LOCATION - PAIN FREQUENCY: INTERMITTENT
PAIN LOCATION - PAIN QUALITY: ACHY

## 2025-01-19 ASSESSMENT — PATIENT HEALTH QUESTIONNAIRE - PHQ9: CLINICAL INTERPRETATION OF PHQ2 SCORE: 0

## 2025-01-20 ENCOUNTER — HOME CARE VISIT (OUTPATIENT)
Dept: HOME HEALTH SERVICES | Facility: HOME HEALTHCARE | Age: 70
End: 2025-01-20
Payer: MEDICARE

## 2025-01-20 ENCOUNTER — DOCUMENTATION (OUTPATIENT)
Dept: MEDICAL GROUP | Facility: PHYSICIAN GROUP | Age: 70
End: 2025-01-20
Payer: MEDICARE

## 2025-01-20 PROCEDURE — 665999 HH PPS REVENUE DEBIT

## 2025-01-20 PROCEDURE — 665998 HH PPS REVENUE CREDIT

## 2025-01-20 NOTE — CASE COMMUNICATION
Palliative care is managing her pain  ----- Message -----  From: Sonja Epstein, PT  Sent: 1/15/2025   4:45 PM PST  To: Blanca Boston R.N.; Tosin León      Please send to JOHAN Maxwell   Patient reporting pain level to upper back as 7-9/10 at [facility] evaluation 1/14/25 - this is outside of accepted parameters and requires physician and care team notification

## 2025-01-20 NOTE — PROGRESS NOTES
Medication chart review for [facility] services    Received referral from [facility].   Medications reviewed  compared with discharge summary if available.  Discharge summary date, if applicable:   1/25    Current medication list per [facility]     Medication list one, patient is currently taking    Current Outpatient Medications:     oxyCODONE immediate release, 10 mg, Oral, Q4HRS PRN    acetaminophen, 1,000 mg, Oral, TID    DULoxetine, 30 mg, Oral, DAILY    Lantus SoloStar, 5 Units, Subcutaneous, Q EVENING    methocarbamol, 750 mg, Oral, 4X/DAY PRN    omeprazole, 20 mg, Oral, DAILY    Insulin Pen Needle 32 G x 4 mm, Use one pen needle in pen device to inject insulin once daily.    cyanocobalamin, 500 mcg, Oral, DAILY    ursodiol, 300 mg, Oral, TID    vitamin D3, 1,000 Units, Oral, BID    lidocaine, 1 Patch, Transdermal, Q24HRS    insulin lispro, 0-9 Units, Subcutaneous, TID AC    sevelamer, 800 mg, Oral, TID WITH MEALS    pantoprazole, 40 mg, Oral, DAILY    famotidine, TAKE 1 TABLET BY MOUTH TWICE A DAY (Patient taking differently: 20 mg, Oral, 2 TIMES DAILY)    atorvastatin, 40 mg, Oral, Q EVENING    lidocaine-prilocaine, 1 g, Topical, PRN    gabapentin, 100 mg, Oral, MO, WE + FR    FreeStyle John 3 Plus Sensor, 1 Each, Does not apply, Q14 DAYS    metoprolol SR, 100 mg, Oral, Q EVENING    Synthroid, 250 mcg, Oral, AM ES    amLODIPine, 10 mg, Oral, DAILY    senna-docusate, 1 Tablet, Oral, DAILY    venlafaxine, 150 mg, Oral, DAILY    liothyronine, 5 mcg, Oral, DAILY    traZODone, 150 mg, Oral, QHS      Medication list two, drugs that the patient has been prescribed or recommended to take by their healthcare provider on discharge summary    Medication List          START taking these medications         Instructions   acetaminophen 500 MG Tabs  Commonly known as: Tylenol    Take 2 Tablets by mouth in the morning, at noon, and at bedtime.  Dose: 1,000 mg      BD Pen Needle Ann Marie U/F  Generic drug: Insulin  Pen Needle 32 G x 4 mm    Doctor's comments: Per patient/formulary preference. ICD-10 code: E10.65 - Uncontrolled type 1 Diabetes Mellitus  Use one pen needle in pen device to inject insulin once daily.      dexamethasone 4 MG Tabs  Start taking on: January 9, 2025  Commonly known as: Decadron    Take 1 Tablet by mouth 2 times a day for 6 days.  Dose: 4 mg      DULoxetine 30 MG Cpep  Commonly known as: Cymbalta    Take 1 Capsule by mouth every day.  Dose: 30 mg      Lantus SoloStar 100 UNIT/ML Sopn injection  Generic drug: insulin glargine    Inject 5 Units under the skin every evening.  Dose: 5 Units      methocarbamol 750 MG Tabs  Commonly known as: Robaxin    Take 1 Tablet by mouth 4 times a day as needed (muscle / bone pain).  Dose: 750 mg      omeprazole 20 MG delayed-release capsule  Start taking on: January 9, 2025  Commonly known as: PriLOSEC    Take 1 Capsule by mouth every day.  Dose: 20 mg                CHANGE how you take these medications         Instructions   famotidine 20 MG Tabs  What changed: when to take this  Commonly known as: Pepcid    TAKE 1 TABLET BY MOUTH TWICE A DAY                CONTINUE taking these medications         Instructions   amLODIPine 10 MG Tabs  Commonly known as: Norvasc    Take 10 mg by mouth every day.   Dose: 10 mg      atorvastatin 40 MG Tabs  Commonly known as: Lipitor    TAKE 1 TABLET BY MOUTH EVERY DAY IN THE EVENING  Dose: 40 mg      cyanocobalamin 500 MCG Tabs  Commonly known as: Vitamin B-12    Take 500 mcg by mouth every day.  Dose: 500 mcg      FreeStyle John 3 Plus Sensor Misc    1 Each every 14 days.  Dose: 1 Each      gabapentin 100 MG Caps  Commonly known as: Neurontin    Doctor's comments: Take immediately after dialysis  Take 1 Capsule by mouth every Monday, Wednesday, and Friday.  Dose: 100 mg      HumaLOG KwikPen 100 UNIT/ML Sopn injection PEN  Generic drug: insulin lispro    Inject 0-9 Units under the skin 3 times a day before meals. 70 - 150 mg/dL = 0  Units 151 - 200 mg/dL = 2 Units 201 - 250 mg/dL = 3 Units 251 - 300 mg/dL = 5 Units 301 - 350 mg/dL = 6 Units 351 - 400 mg/dL = 8 Units Over 400 mg/dL = 9 Units  Dose: 0-9 Units      lidocaine 5 % Ptch  Commonly known as: Lidoderm    Place 1 Patch on the skin every 24 hours.  Dose: 1 Patch      lidocaine-prilocaine 2.5-2.5 % Crea  Commonly known as: Emla    Apply 1 g topically as needed (AVF prior to dialysis). APPLY TO AFFECTED AREA AVF ACCESS 30-60 MINS PRIOR TO DIALYSIS TREATMENT WRAP IN SARAN WRAP  Dose: 1 g      liothyronine 5 MCG Tabs  Commonly known as: Cytomel    Take 1 Tablet by mouth every day.  Dose: 5 mcg      metoprolol  MG Tb24  Commonly known as: Toprol XL    Take 100 mg by mouth every evening.  Dose: 100 mg      oxycodone 15 MG immediate release tablet  Commonly known as: Oxy-IR    Take 1 Tablet by mouth every four hours as needed for Severe Pain for up to 7 days.  Dose: 15 mg      pantoprazole 40 MG Tbec  Commonly known as: Protonix    Take 1 Tablet by mouth every day.  Dose: 40 mg      Senokot S 8.6-50 MG Tabs  Generic drug: senna-docusate    Take 1 Tablet by mouth every day.   Dose: 1 Tablet      sevelamer 800 MG Tabs  Commonly known as: Renagel    Take 800 mg by mouth 3 times a day with meals.  Dose: 800 mg      Synthroid 125 MCG Tabs  Generic drug: levothyroxine    Take 2 Tablets by mouth every morning on an empty stomach.  Dose: 250 mcg      traZODone 150 MG Tabs  Commonly known as: Desyrel    Take 150 mg by mouth at bedtime.   Dose: 150 mg      ursodiol 300 MG Caps  Commonly known as: Actigall    Take 300 mg by mouth 3 times a day.  Dose: 300 mg      venlafaxine 150 MG extended-release capsule  Commonly known as: Effexor-XR    Take 150 mg by mouth every day.   Dose: 150 mg      vitamin D3 1000 Unit (25 mcg) Tabs  Commonly known as: Cholecalciferol    Take 1,000 Units by mouth 2 times a day.  Dose: 1,000 Units                STOP taking these medications       cyclobenzaprine 10 mg  Tabs  Commonly known as: Flexeril       Allergies   Allergen Reactions    Tape Unspecified and Rash     Blisters, paper tape is ok  Other reaction(s): Rash       Labs     Lab Results   Component Value Date/Time    SODIUM 133 (L) 01/10/2025 09:24 AM    POTASSIUM 4.3 01/10/2025 09:24 AM    CHLORIDE 95 (L) 01/10/2025 09:24 AM    CO2 25 01/10/2025 09:24 AM    GLUCOSE 200 (H) 01/10/2025 09:24 AM    BUN 35 (H) 01/10/2025 09:24 AM    CREATININE 3.38 (H) 01/10/2025 09:24 AM    CREATININE 1.0 01/08/2009 04:31 PM     Lab Results   Component Value Date/Time    ALKPHOSPHAT 654 (H) 01/10/2025 09:24 AM    ASTSGOT 74 (H) 01/10/2025 09:24 AM    ALTSGPT 43 01/10/2025 09:24 AM    TBILIRUBIN 0.5 01/10/2025 09:24 AM    INR 0.93 12/05/2024 01:22 PM    ALBUMIN 3.4 01/10/2025 09:24 AM    ALBUMIN 3.35 (L) 05/09/2023 01:16 PM        Assessment for clinically significant drug interactions, drug omissions/additions, duplicative therapies.            CC   Anny Flores, A.P.N.  [address]  [place] [address]  Fax: [phone]    [facility]  Phone [phone] fax [phone]    This note was created using voice recognition software (Dragon). The accuracy of the dictation is limited by the abilities of the software. I have reviewed the note prior to signing, however some errors in grammar and context are still possible. If you have any questions related to this note please do not hesitate to contact our office.

## 2025-01-21 ENCOUNTER — HOME CARE VISIT (OUTPATIENT)
Dept: HOME HEALTH SERVICES | Facility: HOME HEALTHCARE | Age: 70
End: 2025-01-21
Payer: MEDICARE

## 2025-01-21 ENCOUNTER — TELEPHONE (OUTPATIENT)
Dept: PALLIATIVE MEDICINE | Facility: HOSPICE | Age: 70
End: 2025-01-21
Payer: MEDICARE

## 2025-01-21 VITALS
DIASTOLIC BLOOD PRESSURE: 78 MMHG | SYSTOLIC BLOOD PRESSURE: 108 MMHG | RESPIRATION RATE: 19 BRPM | HEART RATE: 73 BPM | TEMPERATURE: 99 F | OXYGEN SATURATION: 98 %

## 2025-01-21 DIAGNOSIS — Z99.2 ESRD NEEDING DIALYSIS (HCC): ICD-10-CM

## 2025-01-21 DIAGNOSIS — Z91.158: ICD-10-CM

## 2025-01-21 DIAGNOSIS — N18.6 ESRD NEEDING DIALYSIS (HCC): ICD-10-CM

## 2025-01-21 DIAGNOSIS — F33.1 MODERATE EPISODE OF RECURRENT MAJOR DEPRESSIVE DISORDER (HCC): ICD-10-CM

## 2025-01-21 PROCEDURE — 665999 HH PPS REVENUE DEBIT

## 2025-01-21 PROCEDURE — 665998 HH PPS REVENUE CREDIT

## 2025-01-21 PROCEDURE — G0152 HHCP-SERV OF OT,EA 15 MIN: HCPCS

## 2025-01-21 PROCEDURE — G0299 HHS/HOSPICE OF RN EA 15 MIN: HCPCS

## 2025-01-21 PROCEDURE — G0151 HHCP-SERV OF PT,EA 15 MIN: HCPCS

## 2025-01-21 ASSESSMENT — ENCOUNTER SYMPTOMS
SEVERE DYSPNEA: 1
LAST BOWEL MOVEMENT: 67224
PAIN LOCATION - RELIEVING FACTORS: REST/MEDS
NAUSEA: PT DENIES ANY NAUSEA AT THIS TIME
PAIN LOCATION - PAIN SEVERITY: 3/10
DEBILITATING PAIN: 1
HIGHEST PAIN SEVERITY IN PAST 24 HOURS: 9/10
LOWEST PAIN SEVERITY IN PAST 24 HOURS: 2/10
PAIN LOCATION - PAIN QUALITY: ACHY
DEBILITATING PAIN: 1
PAIN LOCATION - RELIEVING FACTORS: REST/MEDS
SUBJECTIVE PAIN PROGRESSION: WAXING AND WANING
BOWEL PATTERN NORMAL: 1
PAIN: 1
PAIN LOCATION - PAIN FREQUENCY: FREQUENT
PAIN LOCATION - EXACERBATING FACTORS: MOVEMENT
SUBJECTIVE PAIN PROGRESSION: GRADUALLY WORSENING
PAIN SEVERITY GOAL: 0/10
PAIN LOCATION: BILAT ARMS
PAIN LOCATION - PAIN SEVERITY: 0/10
DEPRESSED MOOD: 1
PAIN LOCATION - RELIEVING FACTORS: LYING STILL
HIGHEST PAIN SEVERITY IN PAST 24 HOURS: 10/10
POOR JUDGMENT: 1
PAIN LOCATION - PAIN QUALITY: ACHY
SHORTNESS OF BREATH: 1
PAIN: 1
PAIN LOCATION - PAIN FREQUENCY: CONSTANT
LOWEST PAIN SEVERITY IN PAST 24 HOURS: 0/10
PAIN LOCATION - PAIN SEVERITY: 7/10
STOOL FREQUENCY: LESS THAN DAILY
PAIN LOCATION - EXACERBATING FACTORS: MOVEMENT
PAIN LOCATION: BACK
COUGH: 1
PAIN LOCATION: BACK
PAIN LOCATION - PAIN FREQUENCY: FREQUENT
PAIN SEVERITY GOAL: 4/10
PAIN LOCATION - EXACERBATING FACTORS: MOVEMENT
VOMITING: PT DENIES ANY EMESIS AT THIS TIME

## 2025-01-21 ASSESSMENT — ACTIVITIES OF DAILY LIVING (ADL)
DRESSING_UB_CURRENT_FUNCTION: MAXIMUM ASSIST
TOILETING: 1
GROOMING ASSESSED: 1
BATHING ASSESSED: 1
PREPARING MEALS: DEPENDENT
TRANSPORTATION: DEPENDENT
AMBULATION ASSISTANCE: STAND BY ASSIST
LAUNDRY ASSESSED: 1
FEEDING ASSESSED: 1
HOUSEKEEPING ASSESSED: 1
CURRENT_FUNCTION: STAND BY ASSIST
LIGHT HOUSEKEEPING: DEPENDENT
PHYSICAL TRANSFERS ASSESSED: 1
FEEDING: STAND BY ASSIST
ORAL_CARE_CURRENT_FUNCTION: NEEDS ASSISTANCE
LAUNDRY: DEPENDENT
DRESSING_LB_CURRENT_FUNCTION: MAXIMUM ASSIST
TELEPHONE USE ASSESSED: 1
ORAL_CARE_ASSESSED: 1
SHOPPING ASSESSED: 1
USING THE TELPHONE: DEPENDENT
SHOPPING: DEPENDENT
GROOMING_CURRENT_FUNCTION: MODERATE ASSIST
TOILETING: MAXIMUM ASSIST
AMBULATION ASSISTANCE: 1
BATHING_CURRENT_FUNCTION: MAXIMUM ASSIST
TRANSPORTATION ASSESSED: 1

## 2025-01-21 NOTE — CASE COMMUNICATION
Quality Review for 1.12.25 SOC OASIS performed on by DARCY Almonte RN on 1.20.2025:    Edits completed by DARCY Almonte RN:  1.  and  dx coding updated per chart review.   2. Added depression to the patient safety precautions  3. Changed  to 1.12.25 per the LSOC order  4. Changed  to 1 and  to 0 for AV fistula  5. Changed  to 2 per [id number] A response of 4  6. Changed  to 6 and  to 5, [id number] D-G, I, P to 88  per PT eval on 1.14.25 reporting pt's inability to ambulate/be out of bed due to pain. Changed  to 1.14.25 date of the PT eval, data used as part of the collaboration convention. Changed [id number] B to 3, C-G to 2 per OT eval on 1.14.25  7. Added triage code

## 2025-01-21 NOTE — Clinical Note
I agree with the changes. Thank you!    ----- Message -----  From: Elizabeth Almonte R.N.  Sent: 1/21/2025  11:40 AM PST  To: Estrellita Gomes R.N.      Quality Review for 1.12.25 SOC OASIS performed on by DARCY Almonte RN on 1.20.2025:    Edits completed by DARCY Almonte RN:  1.  and  dx coding updated per chart review.   2. Added depression to the patient safety precautions  3. Changed  to 1.12.25 per the LSOC order  4. Changed  to 1 and  to 0 for AV fistula  5. Changed  to 2 per [id number] A response of 4  6. Changed  to 6 and  to 5, [id number] D-G, I, P to 88 per PT eval on 1.14.25 reporting pt's inability to ambulate/be out of bed due to pain. Changed  to 1.14.25 date of the PT eval, data used as part of the collaboration convention. Changed [id number] B to 3, C-G to 2 per OT eval on 1.14.25  7. Added triage code

## 2025-01-21 NOTE — TELEPHONE ENCOUNTER
Received message from [facility] stating patient is stopping dialysis and would like to pursue Hospice. Referral placed.

## 2025-01-22 ENCOUNTER — HOSPICE ADMISSION (OUTPATIENT)
Dept: HOSPICE | Facility: HOSPICE | Age: 70
End: 2025-01-22
Payer: MEDICARE

## 2025-01-22 ENCOUNTER — HOSPITAL ENCOUNTER (INPATIENT)
Facility: MEDICAL CENTER | Age: 70
End: 2025-01-22
Attending: EMERGENCY MEDICINE | Admitting: HOSPITALIST
Payer: MEDICARE

## 2025-01-22 ENCOUNTER — HOME CARE VISIT (OUTPATIENT)
Dept: HOSPICE | Facility: HOSPICE | Age: 70
End: 2025-01-22
Payer: MEDICARE

## 2025-01-22 DIAGNOSIS — Z99.2 ESRD NEEDING DIALYSIS (HCC): ICD-10-CM

## 2025-01-22 DIAGNOSIS — M54.6 ACUTE RIGHT-SIDED THORACIC BACK PAIN: ICD-10-CM

## 2025-01-22 DIAGNOSIS — I10 PRIMARY HYPERTENSION: ICD-10-CM

## 2025-01-22 DIAGNOSIS — M46.22 ACUTE OSTEOMYELITIS OF CERVICAL SPINE (HCC): ICD-10-CM

## 2025-01-22 DIAGNOSIS — N18.6 ESRD NEEDING DIALYSIS (HCC): ICD-10-CM

## 2025-01-22 DIAGNOSIS — E11.65 TYPE 2 DIABETES MELLITUS WITH HYPERGLYCEMIA, WITH LONG-TERM CURRENT USE OF INSULIN (HCC): ICD-10-CM

## 2025-01-22 DIAGNOSIS — Z98.61 CAD S/P PERCUTANEOUS CORONARY ANGIOPLASTY: ICD-10-CM

## 2025-01-22 DIAGNOSIS — E16.2 HYPOGLYCEMIA: ICD-10-CM

## 2025-01-22 DIAGNOSIS — M54.9 INTRACTABLE BACK PAIN: ICD-10-CM

## 2025-01-22 DIAGNOSIS — R78.81 BACTEREMIA: ICD-10-CM

## 2025-01-22 DIAGNOSIS — F32.0 CURRENT MILD EPISODE OF MAJOR DEPRESSIVE DISORDER WITHOUT PRIOR EPISODE (HCC): ICD-10-CM

## 2025-01-22 DIAGNOSIS — I25.10 CAD S/P PERCUTANEOUS CORONARY ANGIOPLASTY: ICD-10-CM

## 2025-01-22 DIAGNOSIS — E10.42 DIABETIC POLYNEUROPATHY ASSOCIATED WITH TYPE 1 DIABETES MELLITUS (HCC): ICD-10-CM

## 2025-01-22 DIAGNOSIS — Z79.4 TYPE 2 DIABETES MELLITUS WITH HYPERGLYCEMIA, WITH LONG-TERM CURRENT USE OF INSULIN (HCC): ICD-10-CM

## 2025-01-22 DIAGNOSIS — N18.6 ESRD (END STAGE RENAL DISEASE) (HCC): ICD-10-CM

## 2025-01-22 DIAGNOSIS — M54.9 PAIN, UPPER BACK: ICD-10-CM

## 2025-01-22 DIAGNOSIS — Z51.89 VISIT FOR WOUND CHECK: ICD-10-CM

## 2025-01-22 DIAGNOSIS — R79.89 ABNORMAL LIVER FUNCTION TEST: ICD-10-CM

## 2025-01-22 DIAGNOSIS — K72.90 LIVER FAILURE WITHOUT HEPATIC COMA, UNSPECIFIED CHRONICITY (HCC): ICD-10-CM

## 2025-01-22 PROBLEM — Z66 DNR (DO NOT RESUSCITATE): Status: ACTIVE | Noted: 2025-01-22

## 2025-01-22 PROBLEM — M54.10 RADICULOPATHY AFFECTING UPPER EXTREMITY: Status: ACTIVE | Noted: 2025-01-22

## 2025-01-22 PROBLEM — E11.9 TYPE 2 DIABETES MELLITUS, WITH LONG-TERM CURRENT USE OF INSULIN (HCC): Status: ACTIVE | Noted: 2025-01-22

## 2025-01-22 LAB
ALBUMIN SERPL BCP-MCNC: 3 G/DL (ref 3.2–4.9)
ALBUMIN/GLOB SERPL: 0.8 G/DL
ALP SERPL-CCNC: 806 U/L (ref 30–99)
ALT SERPL-CCNC: 101 U/L (ref 2–50)
ANION GAP SERPL CALC-SCNC: 11 MMOL/L (ref 7–16)
AST SERPL-CCNC: 136 U/L (ref 12–45)
BASOPHILS # BLD AUTO: 1 % (ref 0–1.8)
BASOPHILS # BLD: 0.14 K/UL (ref 0–0.12)
BILIRUB SERPL-MCNC: 0.4 MG/DL (ref 0.1–1.5)
BUN SERPL-MCNC: 52 MG/DL (ref 8–22)
CALCIUM ALBUM COR SERPL-MCNC: 9.6 MG/DL (ref 8.5–10.5)
CALCIUM SERPL-MCNC: 8.8 MG/DL (ref 8.5–10.5)
CHLORIDE SERPL-SCNC: 111 MMOL/L (ref 96–112)
CO2 SERPL-SCNC: 22 MMOL/L (ref 20–33)
CREAT SERPL-MCNC: 2.55 MG/DL (ref 0.5–1.4)
CRP SERPL HS-MCNC: 1.17 MG/DL (ref 0–0.75)
EKG IMPRESSION: NORMAL
EOSINOPHIL # BLD AUTO: 0.49 K/UL (ref 0–0.51)
EOSINOPHIL NFR BLD: 3.6 % (ref 0–6.9)
ERYTHROCYTE [DISTWIDTH] IN BLOOD BY AUTOMATED COUNT: 63 FL (ref 35.9–50)
ERYTHROCYTE [SEDIMENTATION RATE] IN BLOOD BY WESTERGREN METHOD: 83 MM/HOUR (ref 0–25)
GFR SERPLBLD CREATININE-BSD FMLA CKD-EPI: 20 ML/MIN/1.73 M 2
GLOBULIN SER CALC-MCNC: 4 G/DL (ref 1.9–3.5)
GLUCOSE BLD STRIP.AUTO-MCNC: 120 MG/DL (ref 65–99)
GLUCOSE BLD STRIP.AUTO-MCNC: 148 MG/DL (ref 65–99)
GLUCOSE BLD STRIP.AUTO-MCNC: 64 MG/DL (ref 65–99)
GLUCOSE BLD STRIP.AUTO-MCNC: 79 MG/DL (ref 65–99)
GLUCOSE BLD STRIP.AUTO-MCNC: 83 MG/DL (ref 65–99)
GLUCOSE BLD STRIP.AUTO-MCNC: 99 MG/DL (ref 65–99)
GLUCOSE SERPL-MCNC: 43 MG/DL (ref 65–99)
HCT VFR BLD AUTO: 34.9 % (ref 37–47)
HGB BLD-MCNC: 11.2 G/DL (ref 12–16)
IMM GRANULOCYTES # BLD AUTO: 0.09 K/UL (ref 0–0.11)
IMM GRANULOCYTES NFR BLD AUTO: 0.7 % (ref 0–0.9)
LYMPHOCYTES # BLD AUTO: 1.7 K/UL (ref 1–4.8)
LYMPHOCYTES NFR BLD: 12.3 % (ref 22–41)
MCH RBC QN AUTO: 31.2 PG (ref 27–33)
MCHC RBC AUTO-ENTMCNC: 32.1 G/DL (ref 32.2–35.5)
MCV RBC AUTO: 97.2 FL (ref 81.4–97.8)
MONOCYTES # BLD AUTO: 1.27 K/UL (ref 0–0.85)
MONOCYTES NFR BLD AUTO: 9.2 % (ref 0–13.4)
NEUTROPHILS # BLD AUTO: 10.1 K/UL (ref 1.82–7.42)
NEUTROPHILS NFR BLD: 73.2 % (ref 44–72)
NRBC # BLD AUTO: 0 K/UL
NRBC BLD-RTO: 0 /100 WBC (ref 0–0.2)
NT-PROBNP SERPL IA-MCNC: 2340 PG/ML (ref 0–125)
PLATELET # BLD AUTO: 236 K/UL (ref 164–446)
PMV BLD AUTO: 10.2 FL (ref 9–12.9)
POTASSIUM SERPL-SCNC: 4.4 MMOL/L (ref 3.6–5.5)
PROT SERPL-MCNC: 7 G/DL (ref 6–8.2)
RBC # BLD AUTO: 3.59 M/UL (ref 4.2–5.4)
SODIUM SERPL-SCNC: 144 MMOL/L (ref 135–145)
TROPONIN T SERPL-MCNC: 51 NG/L (ref 6–19)
WBC # BLD AUTO: 13.8 K/UL (ref 4.8–10.8)

## 2025-01-22 PROCEDURE — 99285 EMERGENCY DEPT VISIT HI MDM: CPT

## 2025-01-22 PROCEDURE — 96374 THER/PROPH/DIAG INJ IV PUSH: CPT

## 2025-01-22 PROCEDURE — 96375 TX/PRO/DX INJ NEW DRUG ADDON: CPT

## 2025-01-22 PROCEDURE — 36415 COLL VENOUS BLD VENIPUNCTURE: CPT

## 2025-01-22 PROCEDURE — 700111 HCHG RX REV CODE 636 W/ 250 OVERRIDE (IP): Performed by: HOSPITALIST

## 2025-01-22 PROCEDURE — 83880 ASSAY OF NATRIURETIC PEPTIDE: CPT

## 2025-01-22 PROCEDURE — 700101 HCHG RX REV CODE 250

## 2025-01-22 PROCEDURE — 665999 HH PPS REVENUE DEBIT

## 2025-01-22 PROCEDURE — A9270 NON-COVERED ITEM OR SERVICE: HCPCS

## 2025-01-22 PROCEDURE — 85652 RBC SED RATE AUTOMATED: CPT

## 2025-01-22 PROCEDURE — 665998 HH PPS REVENUE CREDIT

## 2025-01-22 PROCEDURE — 82962 GLUCOSE BLOOD TEST: CPT

## 2025-01-22 PROCEDURE — G0378 HOSPITAL OBSERVATION PER HR: HCPCS

## 2025-01-22 PROCEDURE — 96376 TX/PRO/DX INJ SAME DRUG ADON: CPT

## 2025-01-22 PROCEDURE — 700111 HCHG RX REV CODE 636 W/ 250 OVERRIDE (IP): Mod: JZ,TB

## 2025-01-22 PROCEDURE — 700111 HCHG RX REV CODE 636 W/ 250 OVERRIDE (IP): Mod: JZ,TB | Performed by: EMERGENCY MEDICINE

## 2025-01-22 PROCEDURE — 85025 COMPLETE CBC W/AUTO DIFF WBC: CPT

## 2025-01-22 PROCEDURE — 99223 1ST HOSP IP/OBS HIGH 75: CPT | Performed by: HOSPITALIST

## 2025-01-22 PROCEDURE — A9270 NON-COVERED ITEM OR SERVICE: HCPCS | Performed by: HOSPITALIST

## 2025-01-22 PROCEDURE — 84484 ASSAY OF TROPONIN QUANT: CPT

## 2025-01-22 PROCEDURE — 700102 HCHG RX REV CODE 250 W/ 637 OVERRIDE(OP): Performed by: HOSPITALIST

## 2025-01-22 PROCEDURE — 86140 C-REACTIVE PROTEIN: CPT

## 2025-01-22 PROCEDURE — 93005 ELECTROCARDIOGRAM TRACING: CPT | Mod: TC | Performed by: EMERGENCY MEDICINE

## 2025-01-22 PROCEDURE — 700102 HCHG RX REV CODE 250 W/ 637 OVERRIDE(OP)

## 2025-01-22 PROCEDURE — 80053 COMPREHEN METABOLIC PANEL: CPT

## 2025-01-22 PROCEDURE — 96372 THER/PROPH/DIAG INJ SC/IM: CPT

## 2025-01-22 RX ORDER — TRAZODONE HYDROCHLORIDE 50 MG/1
150 TABLET ORAL
Status: DISCONTINUED | OUTPATIENT
Start: 2025-01-22 | End: 2025-02-05 | Stop reason: HOSPADM

## 2025-01-22 RX ORDER — DULOXETIN HYDROCHLORIDE 30 MG/1
30 CAPSULE, DELAYED RELEASE ORAL DAILY
Status: DISCONTINUED | OUTPATIENT
Start: 2025-01-22 | End: 2025-01-31

## 2025-01-22 RX ORDER — HYDROMORPHONE HYDROCHLORIDE 1 MG/ML
1 INJECTION, SOLUTION INTRAMUSCULAR; INTRAVENOUS; SUBCUTANEOUS ONCE
Status: COMPLETED | OUTPATIENT
Start: 2025-01-22 | End: 2025-01-22

## 2025-01-22 RX ORDER — GABAPENTIN 100 MG/1
100 CAPSULE ORAL
Status: DISCONTINUED | OUTPATIENT
Start: 2025-01-22 | End: 2025-01-23

## 2025-01-22 RX ORDER — INSULIN LISPRO 100 [IU]/ML
2-9 INJECTION, SOLUTION INTRAVENOUS; SUBCUTANEOUS
Status: DISCONTINUED | OUTPATIENT
Start: 2025-01-22 | End: 2025-02-05 | Stop reason: HOSPADM

## 2025-01-22 RX ORDER — ATORVASTATIN CALCIUM 40 MG/1
40 TABLET, FILM COATED ORAL EVERY EVENING
Status: DISCONTINUED | OUTPATIENT
Start: 2025-01-22 | End: 2025-01-29

## 2025-01-22 RX ORDER — OXYCODONE HYDROCHLORIDE 5 MG/1
5 TABLET ORAL
Status: DISCONTINUED | OUTPATIENT
Start: 2025-01-22 | End: 2025-01-29

## 2025-01-22 RX ORDER — ACETAMINOPHEN 325 MG/1
650 TABLET ORAL EVERY 6 HOURS PRN
Status: DISCONTINUED | OUTPATIENT
Start: 2025-01-22 | End: 2025-01-28

## 2025-01-22 RX ORDER — DEXTROSE MONOHYDRATE 25 G/50ML
INJECTION, SOLUTION INTRAVENOUS
Status: COMPLETED
Start: 2025-01-22 | End: 2025-01-22

## 2025-01-22 RX ORDER — OXYCODONE HYDROCHLORIDE 10 MG/1
10 TABLET ORAL
Status: DISCONTINUED | OUTPATIENT
Start: 2025-01-22 | End: 2025-01-29

## 2025-01-22 RX ORDER — HYDROMORPHONE HYDROCHLORIDE 1 MG/ML
0.5 INJECTION, SOLUTION INTRAMUSCULAR; INTRAVENOUS; SUBCUTANEOUS
Status: DISCONTINUED | OUTPATIENT
Start: 2025-01-22 | End: 2025-01-29

## 2025-01-22 RX ORDER — HEPARIN SODIUM 5000 [USP'U]/ML
5000 INJECTION, SOLUTION INTRAVENOUS; SUBCUTANEOUS EVERY 8 HOURS
Status: DISCONTINUED | OUTPATIENT
Start: 2025-01-22 | End: 2025-02-05

## 2025-01-22 RX ORDER — AMOXICILLIN 250 MG
2 CAPSULE ORAL EVERY EVENING
Status: DISCONTINUED | OUTPATIENT
Start: 2025-01-22 | End: 2025-02-05 | Stop reason: HOSPADM

## 2025-01-22 RX ORDER — LEVOTHYROXINE SODIUM 125 UG/1
250 TABLET ORAL
Status: DISCONTINUED | OUTPATIENT
Start: 2025-01-23 | End: 2025-02-05 | Stop reason: HOSPADM

## 2025-01-22 RX ORDER — METHOCARBAMOL 750 MG/1
750 TABLET, FILM COATED ORAL 4 TIMES DAILY PRN
Status: DISCONTINUED | OUTPATIENT
Start: 2025-01-22 | End: 2025-01-27

## 2025-01-22 RX ORDER — CYCLOBENZAPRINE HCL 10 MG
10 TABLET ORAL 3 TIMES DAILY PRN
Status: ON HOLD | COMMUNITY
End: 2025-02-04

## 2025-01-22 RX ORDER — AMLODIPINE BESYLATE 10 MG/1
10 TABLET ORAL DAILY
Status: DISCONTINUED | OUTPATIENT
Start: 2025-01-22 | End: 2025-02-05 | Stop reason: HOSPADM

## 2025-01-22 RX ORDER — DEXTROSE MONOHYDRATE 25 G/50ML
50 INJECTION, SOLUTION INTRAVENOUS ONCE
Status: COMPLETED | OUTPATIENT
Start: 2025-01-22 | End: 2025-01-22

## 2025-01-22 RX ORDER — METHOXY POLYETHYLENE GLYCOL-EPOETIN BETA 30 UG/.3ML
30 INJECTION, SOLUTION INTRAVENOUS
COMMUNITY

## 2025-01-22 RX ORDER — OXYCODONE HYDROCHLORIDE 5 MG/1
5-10 TABLET ORAL EVERY 4 HOURS PRN
Status: DISCONTINUED | OUTPATIENT
Start: 2025-01-22 | End: 2025-01-22

## 2025-01-22 RX ORDER — VENLAFAXINE HYDROCHLORIDE 75 MG/1
150 CAPSULE, EXTENDED RELEASE ORAL DAILY
Status: DISCONTINUED | OUTPATIENT
Start: 2025-01-22 | End: 2025-02-05 | Stop reason: HOSPADM

## 2025-01-22 RX ORDER — METOPROLOL SUCCINATE 25 MG/1
100 TABLET, EXTENDED RELEASE ORAL EVERY EVENING
Status: DISCONTINUED | OUTPATIENT
Start: 2025-01-22 | End: 2025-01-23

## 2025-01-22 RX ORDER — LORAZEPAM 1 MG/1
1 TABLET ORAL ONCE
Status: DISCONTINUED | OUTPATIENT
Start: 2025-01-22 | End: 2025-01-25

## 2025-01-22 RX ORDER — RIFAMPIN 300 MG/1
300 CAPSULE ORAL 3 TIMES DAILY
COMMUNITY

## 2025-01-22 RX ORDER — POLYETHYLENE GLYCOL 3350 17 G/17G
1 POWDER, FOR SOLUTION ORAL
Status: DISCONTINUED | OUTPATIENT
Start: 2025-01-22 | End: 2025-02-05 | Stop reason: HOSPADM

## 2025-01-22 RX ORDER — DEXTROSE MONOHYDRATE 25 G/50ML
25 INJECTION, SOLUTION INTRAVENOUS
Status: DISCONTINUED | OUTPATIENT
Start: 2025-01-22 | End: 2025-02-05 | Stop reason: HOSPADM

## 2025-01-22 RX ORDER — DEXAMETHASONE 4 MG/1
4 TABLET ORAL 2 TIMES DAILY
Status: ON HOLD | COMMUNITY
Start: 2025-01-09 | End: 2025-02-04

## 2025-01-22 RX ADMIN — HYDROMORPHONE HYDROCHLORIDE 1 MG: 1 INJECTION, SOLUTION INTRAMUSCULAR; INTRAVENOUS; SUBCUTANEOUS at 09:03

## 2025-01-22 RX ADMIN — DEXTROSE MONOHYDRATE 50 ML: 25 INJECTION, SOLUTION INTRAVENOUS at 08:53

## 2025-01-22 RX ADMIN — GABAPENTIN 100 MG: 100 CAPSULE ORAL at 16:40

## 2025-01-22 RX ADMIN — OXYCODONE 10 MG: 5 TABLET ORAL at 10:40

## 2025-01-22 RX ADMIN — METOPROLOL SUCCINATE 100 MG: 25 TABLET, EXTENDED RELEASE ORAL at 18:31

## 2025-01-22 RX ADMIN — HYDROMORPHONE HYDROCHLORIDE 1 MG: 1 INJECTION, SOLUTION INTRAMUSCULAR; INTRAVENOUS; SUBCUTANEOUS at 06:00

## 2025-01-22 RX ADMIN — OXYCODONE 10 MG: 5 TABLET ORAL at 16:40

## 2025-01-22 RX ADMIN — ATORVASTATIN CALCIUM 40 MG: 40 TABLET, FILM COATED ORAL at 18:31

## 2025-01-22 RX ADMIN — AMLODIPINE BESYLATE 10 MG: 10 TABLET ORAL at 11:41

## 2025-01-22 RX ADMIN — TRAZODONE HYDROCHLORIDE 150 MG: 50 TABLET ORAL at 22:30

## 2025-01-22 RX ADMIN — HYDROMORPHONE HYDROCHLORIDE 1 MG: 1 INJECTION, SOLUTION INTRAMUSCULAR; INTRAVENOUS; SUBCUTANEOUS at 16:51

## 2025-01-22 RX ADMIN — SENNOSIDES AND DOCUSATE SODIUM 2 TABLET: 50; 8.6 TABLET ORAL at 18:31

## 2025-01-22 RX ADMIN — METHOCARBAMOL TABLETS 750 MG: 750 TABLET, COATED ORAL at 22:22

## 2025-01-22 RX ADMIN — METHOCARBAMOL TABLETS 750 MG: 750 TABLET, COATED ORAL at 16:40

## 2025-01-22 RX ADMIN — HYDROMORPHONE HYDROCHLORIDE 1 MG: 1 INJECTION, SOLUTION INTRAMUSCULAR; INTRAVENOUS; SUBCUTANEOUS at 07:07

## 2025-01-22 RX ADMIN — DULOXETINE 30 MG: 30 CAPSULE, DELAYED RELEASE ORAL at 11:41

## 2025-01-22 RX ADMIN — HEPARIN SODIUM 5000 UNITS: 5000 INJECTION, SOLUTION INTRAVENOUS; SUBCUTANEOUS at 21:34

## 2025-01-22 RX ADMIN — VENLAFAXINE HYDROCHLORIDE 150 MG: 75 CAPSULE, EXTENDED RELEASE ORAL at 11:52

## 2025-01-22 RX ADMIN — OXYCODONE HYDROCHLORIDE 10 MG: 10 TABLET ORAL at 21:45

## 2025-01-22 SDOH — ECONOMIC STABILITY: TRANSPORTATION INSECURITY
IN THE PAST 12 MONTHS, HAS THE LACK OF TRANSPORTATION KEPT YOU FROM MEDICAL APPOINTMENTS OR FROM GETTING MEDICATIONS?: PATIENT DECLINED

## 2025-01-22 SDOH — ECONOMIC STABILITY: TRANSPORTATION INSECURITY
IN THE PAST 12 MONTHS, HAS LACK OF RELIABLE TRANSPORTATION KEPT YOU FROM MEDICAL APPOINTMENTS, MEETINGS, WORK OR FROM GETTING THINGS NEEDED FOR DAILY LIVING?: PATIENT DECLINED

## 2025-01-22 ASSESSMENT — LIFESTYLE VARIABLES
HAVE PEOPLE ANNOYED YOU BY CRITICIZING YOUR DRINKING: NO
ALCOHOL_USE: NO
CONSUMPTION TOTAL: NEGATIVE
EVER FELT BAD OR GUILTY ABOUT YOUR DRINKING: NO
TOTAL SCORE: 0
HOW MANY TIMES IN THE PAST YEAR HAVE YOU HAD 5 OR MORE DRINKS IN A DAY: 0
HAVE YOU EVER FELT YOU SHOULD CUT DOWN ON YOUR DRINKING: NO
ON A TYPICAL DAY WHEN YOU DRINK ALCOHOL HOW MANY DRINKS DO YOU HAVE: 0
EVER HAD A DRINK FIRST THING IN THE MORNING TO STEADY YOUR NERVES TO GET RID OF A HANGOVER: NO
TOTAL SCORE: 0
AVERAGE NUMBER OF DAYS PER WEEK YOU HAVE A DRINK CONTAINING ALCOHOL: 0
TOTAL SCORE: 0
DOES PATIENT WANT TO STOP DRINKING: NO

## 2025-01-22 ASSESSMENT — SOCIAL DETERMINANTS OF HEALTH (SDOH)
WITHIN THE PAST 12 MONTHS, THE FOOD YOU BOUGHT JUST DIDN'T LAST AND YOU DIDN'T HAVE MONEY TO GET MORE: PATIENT DECLINED
WITHIN THE PAST 12 MONTHS, YOU WORRIED THAT YOUR FOOD WOULD RUN OUT BEFORE YOU GOT THE MONEY TO BUY MORE: PATIENT DECLINED
WITHIN THE LAST YEAR, HAVE YOU BEEN KICKED, HIT, SLAPPED, OR OTHERWISE PHYSICALLY HURT BY YOUR PARTNER OR EX-PARTNER?: PATIENT DECLINED
WITHIN THE LAST YEAR, HAVE YOU BEEN AFRAID OF YOUR PARTNER OR EX-PARTNER?: PATIENT DECLINED
IN THE PAST 12 MONTHS, HAS THE ELECTRIC, GAS, OIL, OR WATER COMPANY THREATENED TO SHUT OFF SERVICE IN YOUR HOME?: PATIENT DECLINED
WITHIN THE LAST YEAR, HAVE TO BEEN RAPED OR FORCED TO HAVE ANY KIND OF SEXUAL ACTIVITY BY YOUR PARTNER OR EX-PARTNER?: PATIENT DECLINED
WITHIN THE LAST YEAR, HAVE YOU BEEN HUMILIATED OR EMOTIONALLY ABUSED IN OTHER WAYS BY YOUR PARTNER OR EX-PARTNER?: PATIENT DECLINED

## 2025-01-22 ASSESSMENT — COGNITIVE AND FUNCTIONAL STATUS - GENERAL
DRESSING REGULAR LOWER BODY CLOTHING: A LITTLE
EATING MEALS: A LITTLE
DRESSING REGULAR UPPER BODY CLOTHING: A LITTLE
WALKING IN HOSPITAL ROOM: A LITTLE
CLIMB 3 TO 5 STEPS WITH RAILING: A LOT
STANDING UP FROM CHAIR USING ARMS: A LITTLE
SUGGESTED CMS G CODE MODIFIER DAILY ACTIVITY: CK
DAILY ACTIVITIY SCORE: 18
TOILETING: A LITTLE
HELP NEEDED FOR BATHING: A LITTLE
PERSONAL GROOMING: A LITTLE
MOBILITY SCORE: 19
MOVING TO AND FROM BED TO CHAIR: A LITTLE
SUGGESTED CMS G CODE MODIFIER MOBILITY: CK

## 2025-01-22 ASSESSMENT — ENCOUNTER SYMPTOMS
PAIN LOCATION - EXACERBATING FACTORS: MOVEMENT
SHORTNESS OF BREATH: 0
FEVER: 0
DEBILITATING PAIN: 1
PAIN LOCATION - PAIN FREQUENCY: FREQUENT
PAIN LOCATION - PAIN SEVERITY: 10/10
CHILLS: 0
PAIN LOCATION: UPPER BACK
LOWEST PAIN SEVERITY IN PAST 24 HOURS: 6/10
SUBJECTIVE PAIN PROGRESSION: WAXING AND WANING
PAIN: 1
PAIN LOCATION - RELIEVING FACTORS: MEDS, REST
PAIN LOCATION - PAIN QUALITY: SHARP
HIGHEST PAIN SEVERITY IN PAST 24 HOURS: 10/10

## 2025-01-22 ASSESSMENT — ACTIVITIES OF DAILY LIVING (ADL)
AMBULATION_REQUIRES_ASSISTANCE: 1
PHYSICAL_TRANSFER_REQUIRES_ASSISTANCE: 1
CONTINENCE_REQUIRES_ASSISTANCE: 1
DRESSING_REQUIRES_ASSISTANCE: 1
BATHING_REQUIRES_ASSISTANCE: 1

## 2025-01-22 ASSESSMENT — PAIN DESCRIPTION - PAIN TYPE
TYPE: ACUTE PAIN
TYPE: CHRONIC PAIN
TYPE: ACUTE PAIN
TYPE: ACUTE PAIN

## 2025-01-22 ASSESSMENT — FIBROSIS 4 INDEX
FIB4 SCORE: 3.9
FIB4 SCORE: 3.84

## 2025-01-22 ASSESSMENT — PAIN DESCRIPTION - DESCRIPTORS: DESCRIPTORS: ACHING

## 2025-01-22 NOTE — ED NOTES
Rounded on pt. Pt resting comfortably in bed. Bed locked and in lowest position. Call light within reach. Respirations equal and unlabored on room air. No further needs at this time.

## 2025-01-22 NOTE — PROGRESS NOTES
Skin assessment unable to be completed due to patient's pain at this time. Will attempt again later this shift.

## 2025-01-22 NOTE — H&P
Hospital Medicine History & Physical Note    Date of Service  1/22/2025    Primary Care Physician  PAM Maxwell.    Consultants  nephrology    Specialist Names: Dr. Najjar    Code Status  DNAR/DNI    Chief Complaint  Chief Complaint   Patient presents with    Arm Pain     BIBA from home for c/o BUE pain for 4 weeks, EMS gave 100mcg fentanyl IVP.  Missed HD and pain management d/t pain         History of Presenting Illness  Anu Manuel is a 69 y.o. female who presented 1/22/2025 with bilateral arm pain.  Ms. Manuel has a past medical history of insulin-dependent diabetes mellitus, end-stage renal disease on dialysis via a right tunneled catheter, coronary artery disease that was most recently admitted here December 31 through January 3 with acute on chronic back pain and then was admitted again from 1/7/2025 through 1/8/2025.  She presents today with bilateral arm pain she feels is coming from her neck and cannot go to dialysis because of it and complains of hopelessness.  Upon review of the records the MRI of the cervical spine from 1/8/2025 revealed abnormal bone marrow signal with raising the possibility of infection and short-term follow-up imaging was recommended.  Ms. Manuel received IV Dilaudid in the emergency room will be placed under observation status with oral oxycodone for pain management for now.  MRI has been ordered to repeat with contrast and she will have dialysis by nephrology as well.    I discussed the plan of care with Dr. Acevedo in person.    Review of Systems  Review of Systems   Constitutional:  Negative for chills and fever.   Respiratory:  Negative for shortness of breath.    Cardiovascular:  Negative for chest pain.       Past Medical History   has a past medical history of Accidental drug overdose (08/27/2012), Adverse effect of anesthesia, Anemia, Anemia due to chronic kidney disease, on chronic dialysis (HCC) (12/31/2024), Anesthesia, Arrhythmia, Arthritis, Bowel  habit changes (More frequent), Breath shortness, Broken hip (HCC) (04/2024), Cataract (2022), Cigarette smoker (quit 2013), Dental disorder, depression (08/30/2016), Diabetes mellitus type 1 (HCC) (1989), Dialysis patient (HCC) (Short time due to a kidney injury from a infection), Emphysema of lung (HCC), Encounter for long-term (current) use of insulin (HCC) (09/25/2013), GI bleed, Heart burn, High cholesterol, Hypertension, Hypothyroidism, postsurgical (1970), Indigestion, Infectious disease, Jaundice (2021 Liver disease), Joint replacement, Liver disease, Liver failure (HCC), Myocardial infarct (HCC) (2019), Pain, Polyneuropathy in diabetes(357.2) (06/10/2015), Renal disorder, Status post appendectomy, 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    Surgical History   has a past surgical history that includes hysterectomy, vaginal (2006); thyroid lobectomy (1973); lumpectomy (1976, 2005); cervical disk and fusion anterior (03/12/2008); tonsillectomy (1963); cervical fusion posterior (01/16/2009); hardware removal neuro (01/16/2009); neck exploration (01/16/2009); lumpectomy; lumbar laminectomy diskectomy (Right, 05/10/2016); shoulder decompression arthroscopic (06/17/2008); clavicle distal excision (06/17/2008); shoulder arthroscopy w/ rotator cuff repair (10/09/2008); pr njx aa&/strd tfrml epi lumbar/sacral 1 level (Right, 08/31/2016); spinal cord stimulator (N/A, 10/26/2018); thoracic laminectomy (N/A, 10/26/2018); appendectomy (2004); pr ins/rplc spi npg/rcvr pocket (N/A, 12/16/2019); irrigation & debridement neuro (01/19/2020); cath placement (01/25/2020); pr ercp,diagnostic (N/A, 10/26/2021); pr upper gi endoscopy,biopsy (N/A, 10/26/2021); pr upper gi endoscopy,diagnosis (N/A, 10/26/2021); peter by laparoscopy (N/A, 10/28/2021); pr ercp,diagnostic (N/A, 01/14/2022); other cardiac surgery (2020 stents inserted); other abdominal  surgery (); pr lap insert intraperitoneal catheter (2024); cataract phaco with iol; pb remove perm cannula/catheter (2024); av fistula creation (Left, 2024); and av fistula revision (Left, 2024).     Family History  family history includes Cancer in her father; Hypertension in her mother.   Family history reviewed with patient. There is no family history that is pertinent to the chief complaint.     Social History   reports that she has been smoking cigarettes. She has a 15 pack-year smoking history. She has never used smokeless tobacco. She reports that she does not currently use alcohol. She reports that she does not currently use drugs after having used the following drugs: Inhaled, Oral, and Marijuana.she lives alone    Allergies  Allergies   Allergen Reactions    Tape Unspecified and Rash     Blisters, paper tape is ok  Other reaction(s): Rash       Medications  Prior to Admission Medications   Prescriptions Last Dose Informant Patient Reported? Taking?   Continuous Glucose Sensor (FREESTYLE PARISA 3 PLUS SENSOR) [facility]  Patient No No   Si Each every 14 days.   DULoxetine (CYMBALTA) 30 MG Cap DR Particles   No No   Sig: Take 1 Capsule by mouth every day.   Insulin Pen Needle 32 G x 4 mm   No No   Sig: Use one pen needle in pen device to inject insulin once daily.   SYNTHROID 125 MCG Tab  Patient No No   Sig: Take 2 Tablets by mouth every morning on an empty stomach.   acetaminophen (TYLENOL) 500 MG Tab   No No   Sig: Take 2 Tablets by mouth in the morning, at noon, and at bedtime.   amLODIPine (NORVASC) 10 MG Tab  Patient Yes No   Sig: Take 10 mg by mouth every day.     Indications: High Blood Pressure   atorvastatin (LIPITOR) 40 MG Tab  Patient No No   Sig: TAKE 1 TABLET BY MOUTH EVERY DAY IN THE EVENING   cyanocobalamin (VITAMIN B-12) 500 MCG Tab  Patient Yes No   Sig: Take 500 mcg by mouth every day. Indications: Inadequate Vitamin B12   famotidine (PEPCID) 20 MG Tab  Patient No  No   Sig: TAKE 1 TABLET BY MOUTH TWICE A DAY   Patient taking differently: Take 20 mg by mouth 2 times a day.   gabapentin (NEURONTIN) 100 MG Cap  Patient No No   Sig: Take 1 Capsule by mouth every Monday, Wednesday, and Friday.   insulin glargine (LANTUS SOLOSTAR) 100 UNIT/ML Solution Pen-injector injection   No No   Sig: Inject 5 Units under the skin every evening.   insulin lispro 100 UNIT/ML SC SOPN injection PEN  Patient No No   Sig: Inject 0-9 Units under the skin 3 times a day before meals. 70 - 150 mg/dL = 0 Units 151 - 200 mg/dL = 2 Units 201 - 250 mg/dL = 3 Units 251 - 300 mg/dL = 5 Units 301 - 350 mg/dL = 6 Units 351 - 400 mg/dL = 8 Units Over 400 mg/dL = 9 Units   lidocaine (LIDODERM) 5 % Patch   No No   Sig: Place 1 Patch on the skin every 24 hours.   lidocaine-prilocaine (EMLA) 2.5-2.5 % Cream  Patient Yes No   Sig: Apply 1 g topically as needed (AVF prior to dialysis). APPLY TO AFFECTED AREA AVF ACCESS 30-60 MINS PRIOR TO DIALYSIS TREATMENT WRAP IN SARAN WRAP      Indications: Dialysis   liothyronine (CYTOMEL) 5 MCG Tab  Patient No No   Sig: Take 1 Tablet by mouth every day.   methocarbamol (ROBAXIN) 750 MG Tab   No No   Sig: Take 1 Tablet by mouth 4 times a day as needed (muscle / bone pain).   metoprolol SR (TOPROL XL) 100 MG TABLET SR 24 HR  Patient Yes No   Sig: Take 100 mg by mouth every evening. Indications: Atrial Fibrillation   omeprazole (PRILOSEC) 20 MG delayed-release capsule   No No   Sig: Take 1 Capsule by mouth every day.   oxyCODONE immediate release (ROXICODONE) 10 MG immediate release tablet   No No   Sig: Take 1 Tablet by mouth every four hours as needed for Moderate Pain for up to 7 days.   pantoprazole (PROTONIX) 40 MG Tablet Delayed Response  Patient No No   Sig: Take 1 Tablet by mouth every day.   senna-docusate (SENOKOT S) 8.6-50 MG Tab  Patient Yes No   Sig: Take 1 Tablet by mouth every day.      sevelamer (RENAGEL) 800 MG Tab  Patient Yes No   Sig: Take 800 mg by mouth 3 times  a day with meals. Indications: High Amount of Phosphate in the Blood   traZODone (DESYREL) 150 MG Tab  Patient Yes No   Sig: Take 150 mg by mouth at bedtime.      ursodiol (ACTIGALL) 300 MG Cap  Patient Yes No   Sig: Take 300 mg by mouth 3 times a day. Indications: Liver Disease   venlafaxine (EFFEXOR-XR) 150 MG extended-release capsule  Patient Yes No   Sig: Take 150 mg by mouth every day.      vitamin D3 (CHOLECALCIFEROL) 1000 Unit (25 mcg) Tab  Patient Yes No   Sig: Take 1,000 Units by mouth 2 times a day. Indications: Osteoporosis      Facility-Administered Medications: None       Physical Exam  Temp:  [35.8 °C (96.4 °F)] 35.8 °C (96.4 °F)  Pulse:  [76-88] 76  Resp:  [16-18] 18  BP: (151-207)/() 183/83  SpO2:  [93 %-96 %] 93 %  Blood Pressure : (!) 183/83   Temperature: (!) 35.8 °C (96.4 °F)   Pulse: 76   Respiration: 18   Pulse Oximetry: 93 %       Physical Exam  Vitals and nursing note reviewed.   Constitutional:       Appearance: She is ill-appearing.   Cardiovascular:      Rate and Rhythm: Normal rate and regular rhythm.      Comments: Right chest dialysis port  Abdominal:      General: There is no distension.      Tenderness: There is no abdominal tenderness.   Musculoskeletal:      Comments: Left great toe is red and warm with appropriate capillary refill   Neurological:      General: No focal deficit present.      Mental Status: She is alert and oriented to person, place, and time.         Laboratory:  Recent Labs     01/22/25  0559   WBC 13.8*   RBC 3.59*   HEMOGLOBIN 11.2*   HEMATOCRIT 34.9*   MCV 97.2   MCH 31.2   MCHC 32.1*   RDW 63.0*   PLATELETCT 236   MPV 10.2     Recent Labs     01/22/25  0732   SODIUM 144   POTASSIUM 4.4   CHLORIDE 111   CO2 22   GLUCOSE 43*   BUN 52*   CREATININE 2.55*   CALCIUM 8.8     Recent Labs     01/22/25  0732   ALTSGPT 101*   ASTSGOT 136*   ALKPHOSPHAT 806*   TBILIRUBIN 0.4   GLUCOSE 43*         Recent Labs     01/22/25  0732   NTPROBNP 2340*         Recent Labs      01/22/25  0732   TROPONINT 51*       Imaging:  DX-CHEST-PORTABLE (1 VIEW)   Final Result         1.  No acute cardiopulmonary disease.   2.  Atherosclerosis      US-RUQ    (Results Pending)   MR-CERVICAL SPINE-WITH & W/O    (Results Pending)           Assessment/Plan:  Justification for Admission Status  I anticipate this patient is appropriate for observation status at this time because MRI of the cervical spine to further evaluate the prior abnormalities    Patient will need a Med/Surg bed on MEDICAL service .  The need is secondary to as above.    * Radiculopathy affecting upper extremity- (present on admission)  Assessment & Plan  Her pain has been debilitating despite Neurontin and Cymbalta.  She had the abnormal MRI noted below.  Because of this the MRI will be repeated as she may benefit from spine surgery consultation for either inpatient or outpatient management based on the results.  If no intervention is warranted then at least spine physician on-call may be able to arrange outpatient epidural injections in their office.  She will be given oral narcotics and no further IV narcotics at this time in order to start the transition of discharge home eventually on oral medications.  1.  Postoperative changes noted in the cervical spine with posterior lateral mass fusion between C4 and C7.     2.  Abnormal bone marrow signal abnormal enhancement identified at the C6-C7 level involving the adjacent endplates and the entirety of the C7 vertebral body. Slightly increased T2 signal is also noted within the disc space at this level with minimal   enhancement. However, this area did demonstrate a sclerotic appearance on previous CT. Minimal abnormal signal is present also within the prevertebral soft tissues at this level. A new infectious process cannot be completely excluded. Recommend   correlation with history, physical exam and consider short-term follow-up imaging.     3.  Moderate canal stenosis is noted at  C6-C7.        Exam Ended: 01/08/25  9:50 AM       Type 2 diabetes mellitus, with long-term current use of insulin ([facility])- (present on admission)  Assessment & Plan  Continue glargine and add sliding scale with a diabetic diet    DNR (do not resuscitate)- (present on admission)  Assessment & Plan  Per her wishes  She has been followed by palliative care in the past    ESRD needing dialysis ([facility])- (present on admission)  Assessment & Plan  She has a left arm fistula  Nephrology will be consulted for dialysis    CAD S/P percutaneous coronary angioplasty- (present on admission)  Assessment & Plan  History of    Primary hypertension- (present on admission)  Assessment & Plan  Continue Norvasc and Toprol with holding parameters        VTE prophylaxis: heparin ppx

## 2025-01-22 NOTE — PROGRESS NOTES
Pain reported at a 9/10 after oxycodone administration. Escalated to MD. MD acknowledged. No orders received.

## 2025-01-22 NOTE — PROGRESS NOTES
CGM alerted us to a blood glucose of 79 that was trending down. Apple juice given. I contacted MD to order diet.

## 2025-01-22 NOTE — DISCHARGE PLANNING
Referral Management Team    Hospice referral received via [employer] will round to obtain choice ASAP  If there are any questions, please contact me directly.    Patient is undecided about hospice vs curative treatment. Patient can benefit from a goals of care discussion.     Message sent to medical team.

## 2025-01-22 NOTE — PROGRESS NOTES
Pt arrived to unit via stretcher at 1011. Pt oriented to hallway and plan to move to room as one is available, unit, and plan of care. All questions answered at this time. Call light within reach; fall precautions in place.

## 2025-01-22 NOTE — HOSPICE
[facility] Hospice referral/consult response    Is this patient accepted to [facility] Hospice?: Yes, when pt makes her decision  What hospice level of care?: Routine  Approved by provider: Dr. Stinson    Anticipated DC date: ?  DC Barriers: Pt's decision on hospice vs treatment   Additional Information: It has been 5d since pt's last dialysis & she was only on for 1h due to arm pain.  She continues to have severe arm pain.  She isn't sure if she wants to  stop dialysis or not.  She has been seen by outpatient palliative due to pain.  She has had several loses recently.  Her family is unaware of her current hospitalization.  She wants to further discuss options.  Visit ended early since she was getting alarms of low blood sugar from her continuous glucose monitor.  Pt given apple juice, floor RN notified.

## 2025-01-22 NOTE — ED NOTES
Pharmacy Medication Reconciliation      ~Medication reconciliation updated and complete per patient at bedside & patient home pharmacy   ~Allergies have been verified and updated   ~No oral ABX within the last 30 days  ~Patient home pharmacy :  [facility]  ~Mircera verified with Amelie    ~Anticoagulants (rivaroxaban, apixaban, edoxaban, dabigatran, warfarin, enoxaparin) taken in the last 14 days? no

## 2025-01-22 NOTE — ED NOTES
Transport at bedside to transport pt to [facility] bed1. Pt alert and oriented with even and regular respirations on room air. Pt with all belongings and chart. Report called to receiving RN.

## 2025-01-22 NOTE — ED NOTES
Lab called with critical result- glucose of 43. ERP notified. Pt medicated with dextrose per ERP orders.

## 2025-01-22 NOTE — DISCHARGE PLANNING
"TCN following. HTH/SCP chart review completed. Noted possible transition to hospice per discussion with MD and chart review. Noted hospice referral specialist has initiated discussion with pt and is being followed by referral management team. Per referral management team \"Patient is undecided about hospice vs curative treatment. Patient can benefit from a goals of care discussion\". TCN will monitor chart for further GOC discussion/medical POC and assist with transitional care planning if indicated.     "

## 2025-01-22 NOTE — ASSESSMENT & PLAN NOTE
Her pain has been debilitating despite Neurontin and Cymbalta.  She had the abnormal MRI noted below.  Because of this the MRI will be repeated as she may benefit from spine surgery consultation for either inpatient or outpatient management based on the results.  .  She will be given oral narcotics and no further IV narcotics at this time in order to start the transition of discharge home eventually on oral   Continue on Cymbalta, gabapentin  MRI C-spine showed osteomyelitis/discitis  Requiring frequent IV narcotics, close monitoring for toxicity while on IV controlled substance.   Neurosurgery recommending conservative management, no surgical intervention at this time  Infectious disease consulted

## 2025-01-22 NOTE — ED TRIAGE NOTES
Chief Complaint   Patient presents with    Arm Pain     BIBA from home for c/o BUE pain for 4 weeks, EMS gave 100mcg fentanyl IVP.  Missed HD and pain management d/t pain       Vitals:    01/22/25 0520   BP: (!) 207/118   Pulse: 88   Resp: 16   Temp: (!) 35.8 °C (96.4 °F)   SpO2: 96%

## 2025-01-22 NOTE — PROGRESS NOTES
CGM alerted us to a BG of 74 after apple juice. Confirmed with hospital meter and was at 79. Chocolate ice cream given.

## 2025-01-22 NOTE — ED PROVIDER NOTES
ED PHYSICIAN NOTE    CHIEF COMPLAINT  Chief Complaint   Patient presents with    Arm Pain     BIBA from home for c/o BUE pain for 4 weeks, EMS gave 100mcg fentanyl IVP.  Missed HD and pain management d/t pain         EXTERNAL RECORDS REVIEWED  Inpatient Notes patient was admitted to hospital with back pain earlier this month.  She was treated symptomatically.  Referred to palliative care for advanced pain management.  Underwent MRI of the T-spine as well as C-spine.    MRI cervical spine 1/8/2025  1.  Postoperative changes noted in the cervical spine with posterior lateral mass fusion between C4 and C7.     2.  Abnormal bone marrow signal abnormal enhancement identified at the C6-C7 level involving the adjacent endplates and the entirety of the C7 vertebral body. Slightly increased T2 signal is also noted within the disc space at this level with minimal   enhancement. However, this area did demonstrate a sclerotic appearance on previous CT. Minimal abnormal signal is present also within the prevertebral soft tissues at this level. A new infectious process cannot be completely excluded. Recommend   correlation with history, physical exam and consider short-term follow-up imaging.     3.  Moderate canal stenosis is noted at C6-C7.    MRI thoracic spine 1/8/2025  Mild degenerative changes at T9-10. Otherwise, no significant abnormality is identified in the thoracic spine.     CTA thoracicoabdominal aorta 12/22/2024  1.  No aortic aneurysm or dissection identified.  2.  Atherosclerosis and atherosclerotic coronary artery disease  3.  Common bile duct dilatation, commonly associated with postcholecystectomy physiology, consider causes of biliary obstruction with additional workup as clinically appropriate.  4.  Hepatomegaly  5.  Irregular hepatic contour favoring changes of cirrhosis  6.  Pulmonary nodule, see nodule follow-up recommendations below.      HPI/ROS      Anu Manuel is a 69 y.o. female who  "presents because of back pain.  Patient reports this started about a month ago.  She says her back pain started after having a fistula placed in her left arm.  She developed steal syndrome and her fistula was closed.  She has had persistent back pain.  She localizes it in her mid upper back.  Severe stabbing radiates into her chest and down both arms.  Has gradually worsened over the course of the last month.  Severe over the last 4 days.  She has tingling sensation in her hands.  She feels like she is losing sensation.  She feels generally weak.  She has not had a fever.  No cough or difficulty breathing.  Denies abdominal pain, nausea vomiting.  She has been in ER and hospitalized previously for these complaints as noted above.  Because her pain is so severe she has been considering going off dialysis and onto hospice.    PAST MEDICAL HISTORY  Past Medical History:   Diagnosis Date    Accidental drug overdose 08/27/2012    Adverse effect of anesthesia     in 2008 \"throat closes up\"\"anxiety\" ?laryngospasm, kept in ICU. Pt states no problems currently 2018.     Anemia     Anemia due to chronic kidney disease, on chronic dialysis ([facility]) 12/31/2024    Anesthesia     in 2008 \"throat closes up\"\"anxiety\" ?laryngospasm, kept in ICU. Pt states no problems currently 2018.     Arrhythmia     A FIB    Arthritis     right shoulder, hands, OSTEO    Bowel habit changes More frequent    Breath shortness     Broken hip ([facility]) 04/2024    Broken hip possibly the pelvis.  Inoperable    Cataract 2022    BILAT IOL    Cigarette smoker quit 2013    Dental disorder     upper denture    depression 08/30/2016    denies depression, states has anxiety and panic attacks    Diabetes mellitus type 1 ([facility]) 1989    insulin    Dialysis patient ([facility]) Short time due to a kidney injury from a infection    [facility]ALYS[facility], [place], [facility], [facility], [facility]    Emphysema of lung ([facility])     COPD    Encounter for long-term (current) use of insulin ([facility]) 09/25/2013    GI " "bleed     Heart burn     High cholesterol     Hypertension     Hypothyroidism, postsurgical 1970    Indigestion     Infectious disease      had hepatitis C, tested neg.    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  Has smoked for about 50 years.   Vaping Use    Vaping status: Never Used   Substance Use Topics    Alcohol use: Not Currently    Drug use: Not Currently     Types: Inhaled, Oral, Marijuana     Comment: Marijuana - Occasional; edibles       CURRENT MEDICATIONS  Home Medications       Reviewed by Stone Hernandez (Pharmacy Tech) on 01/22/25 at 1013  Med List Status: Complete     Medication Last Dose Status   amLODIPine (NORVASC) 10 MG Tab 1/21/2025 Active   atorvastatin (LIPITOR) 40 MG Tab 1/20/2025 Active   Continuous Glucose Sensor (FREESTYLE PARISA 3 PLUS SENSOR) Misc Unknown Active   cyanocobalamin (VITAMIN B-12) 500 MCG Tab 1/21/2025 Active   cyclobenzaprine (FLEXERIL) 10 mg Tab Unknown Active   dexamethasone (DECADRON) 4 MG Tab Unknown Active   DULoxetine (CYMBALTA) 30 MG Cap DR Particles 1/21/2025 Active   famotidine (PEPCID) 20 MG Tab 1/21/2025 Active   gabapentin (NEURONTIN) 100 MG Cap 1/17/2025 Active   insulin glargine (LANTUS SOLOSTAR) 100 UNIT/ML Solution " "Pen-injector injection 1/21/2025 Active   insulin lispro 100 UNIT/ML SC SOPN injection PEN 1/21/2025 Active   Insulin Pen Needle 32 G x 4 mm Unknown Active   lidocaine-prilocaine (EMLA) 2.5-2.5 % Cream Unknown Active   liothyronine (CYTOMEL) 5 MCG Tab 1/21/2025 Active   methocarbamol (ROBAXIN) 750 MG Tab Unknown Active   Methoxy PEG-Epoetin Beta (MIRCERA) 30 MCG/0.3ML Solution Prefilled Syringe 1/13/2025 Active   metoprolol SR (TOPROL XL) 100 MG TABLET SR 24 HR 1/20/2025 Active   omeprazole (PRILOSEC) 20 MG delayed-release capsule 1/21/2025 Active   oxyCODONE immediate release (ROXICODONE) 10 MG immediate release tablet Unknown Active   pantoprazole (PROTONIX) 40 MG Tablet Delayed Response 1/21/2025 Active   riFAMPin (RIFADINE) 300 MG Cap 1/21/2025 Active   senna-docusate (SENOKOT S) 8.6-50 MG Tab 1/21/2025 Active   sevelamer (RENAGEL) 800 MG Tab  Active   SYNTHROID 125 MCG Tab 1/21/2025 Active   traZODone (DESYREL) 150 MG Tab 1/20/2025 Active   ursodiol (ACTIGALL) 300 MG Cap 1/21/2025 Active   venlafaxine (EFFEXOR-XR) 150 MG extended-release capsule 1/21/2025 Active   vitamin D3 (CHOLECALCIFEROL) 1000 Unit (25 mcg) Tab 1/21/2025 Active                  Audit from Redirected Encounters    **Home medications have not yet been reviewed for this encounter**         ALLERGIES  Allergies   Allergen Reactions    Tape Unspecified and Rash     Blisters, paper tape is ok  Other reaction(s): Rash       PHYSICAL EXAM  VITAL SIGNS: BP (!) 149/82   Pulse 75   Temp 36.4 °C (97.6 °F) (Temporal)   Resp 14   Ht 1.676 m (5' 6\")   Wt 57.4 kg (126 lb 8.7 oz)   LMP 04/29/2005 (LMP Unknown)   SpO2 96%   BMI 20.42 kg/m²    Constitutional: Awake and alert.  Crying and obviously uncomfortable  HENT: Normal inspection  Eyes: Normal inspection  Neck: Grossly normal range of motion.  Cardiovascular: Normal heart rate, Normal rhythm.  Strong radial pulse bilaterally equal.  Brisk capillary refill of the digits.  1+ dorsalis pedis " pulse.  Thorax & Lungs: No respiratory distress, No wheezing, No rales, No rhonchi, No chest tenderness.   Abdomen: Bowel sounds normal, soft, non-distended, nontender, no mass  Skin: Cracked skin mostly on the left foot.  No cellulitis  Back: Diffuse upper thoracic tenderness.  Extremities: No clubbing, cyanosis, edema, no Homans or cords.  Neurologic: Awake alert oriented.  Generally weak.  No focal weakness.  No jennifer sensory loss.      DIAGNOSTIC STUDIES / PROCEDURES  LABS/EKG  Results for orders placed or performed during the hospital encounter of 25   EKG (Now)    Collection Time: 25  5:54 AM   Result Value Ref Range    Report       [facility]    Test Date:  2025  Pt Name:    KALPANA BUTTS               Department: ER  MRN:        [id number]                      Room:       BL 15  Gender:     Female                       Technician: [id number]  :        1955                   Requested By:KIMBERLY WYLIE  Order #:    [id number]                    Reading MD:    Measurements  Intervals                                Axis  Rate:       87                           P:          62  NY:         168                          QRS:        31  QRSD:       102                          T:          58  QT:         382  QTc:        460    Interpretive Statements  Sinus rhythm  Probable left atrial enlargement  Low voltage, extremity leads  Compared to ECG 01/10/2025 07:11:45  Low QRS voltage now present     CBC WITH DIFFERENTIAL    Collection Time: 25  5:59 AM   Result Value Ref Range    WBC 13.8 (H) 4.8 - 10.8 K/uL    RBC 3.59 (L) 4.20 - 5.40 M/uL    Hemoglobin 11.2 (L) 12.0 - 16.0 g/dL    Hematocrit 34.9 (L) 37.0 - 47.0 %    MCV 97.2 81.4 - 97.8 fL    MCH 31.2 27.0 - 33.0 pg    MCHC 32.1 (L) 32.2 - 35.5 g/dL    RDW 63.0 (H) 35.9 - 50.0 fL    Platelet Count 236 164 - 446 K/uL    MPV 10.2 9.0 - 12.9 fL    Neutrophils-Polys 73.20 (H) 44.00 - 72.00 %    Lymphocytes  12.30 (L) 22.00 - 41.00 %    Monocytes 9.20 0.00 - 13.40 %    Eosinophils 3.60 0.00 - 6.90 %    Basophils 1.00 0.00 - 1.80 %    Immature Granulocytes 0.70 0.00 - 0.90 %    Nucleated RBC 0.00 0.00 - 0.20 /100 WBC    Neutrophils (Absolute) 10.10 (H) 1.82 - 7.42 K/uL    Lymphs (Absolute) 1.70 1.00 - 4.80 K/uL    Monos (Absolute) 1.27 (H) 0.00 - 0.85 K/uL    Eos (Absolute) 0.49 0.00 - 0.51 K/uL    Baso (Absolute) 0.14 (H) 0.00 - 0.12 K/uL    Immature Granulocytes (abs) 0.09 0.00 - 0.11 K/uL    NRBC (Absolute) 0.00 K/uL   Sed Rate    Collection Time: 01/22/25  5:59 AM   Result Value Ref Range    Sed Rate Westergren 83 (H) 0 - 25 mm/hour   Comp Metabolic Panel    Collection Time: 01/22/25  7:32 AM   Result Value Ref Range    Sodium 144 135 - 145 mmol/L    Potassium 4.4 3.6 - 5.5 mmol/L    Chloride 111 96 - 112 mmol/L    Co2 22 20 - 33 mmol/L    Anion Gap 11.0 7.0 - 16.0    Glucose 43 (LL) 65 - 99 mg/dL    Bun 52 (H) 8 - 22 mg/dL    Creatinine 2.55 (H) 0.50 - 1.40 mg/dL    Calcium 8.8 8.5 - 10.5 mg/dL    Correct Calcium 9.6 8.5 - 10.5 mg/dL    AST(SGOT) 136 (H) 12 - 45 U/L    ALT(SGPT) 101 (H) 2 - 50 U/L    Alkaline Phosphatase 806 (H) 30 - 99 U/L    Total Bilirubin 0.4 0.1 - 1.5 mg/dL    Albumin 3.0 (L) 3.2 - 4.9 g/dL    Total Protein 7.0 6.0 - 8.2 g/dL    Globulin 4.0 (H) 1.9 - 3.5 g/dL    A-G Ratio 0.8 g/dL   TROPONIN    Collection Time: 01/22/25  7:32 AM   Result Value Ref Range    Troponin T 51 (H) 6 - 19 ng/L   proBrain Natriuretic Peptide, NT    Collection Time: 01/22/25  7:32 AM   Result Value Ref Range    NT-proBNP 2340 (H) 0 - 125 pg/mL   CRP QUANTITIVE (NON-CARDIAC)    Collection Time: 01/22/25  7:32 AM   Result Value Ref Range    Stat C-Reactive Protein 1.17 (H) 0.00 - 0.75 mg/dL   ESTIMATED GFR    Collection Time: 01/22/25  7:32 AM   Result Value Ref Range    GFR (CKD-EPI) 20 (A) >60 mL/min/1.73 m 2   POCT glucose device results    Collection Time: 01/22/25  9:14 AM   Result Value Ref Range    POC Glucose,  Blood 148 (H) 65 - 99 mg/dL   POCT glucose device results    Collection Time: 01/22/25  9:41 AM   Result Value Ref Range    POC Glucose, Blood 99 65 - 99 mg/dL   POCT glucose device results    Collection Time: 01/22/25 10:48 AM   Result Value Ref Range    POC Glucose, Blood 79 65 - 99 mg/dL     *Note: Due to a large number of results and/or encounters for the requested time period, some results have not been displayed. A complete set of results can be found in Results Review.          RADIOLOGY  US-RUQ   Final Result      1.  Prior cholecystectomy.      2.  Common bile duct diameter measured at 12 mm with some intrahepatic biliary ductal dilatation. This may be related to presence of prior cholecystectomy.      3.  The liver is heterogeneous consistent with fatty change versus hepatocellular dysfunction.      DX-CHEST-PORTABLE (1 VIEW)   Final Result         1.  No acute cardiopulmonary disease.   2.  Atherosclerosis      MR-CERVICAL SPINE-WITH & W/O    (Results Pending)         COURSE & MEDICAL DECISION MAKING    INITIAL ASSESSMENT, COURSE AND PLAN  Case narrative: Patient presents with worsening neck and back pain.  She has radicular symptoms radiating to her arms.  Describes some weakness.  Cannot appreciate jennfier focal weakness on examination, but she is generally weak.  She is quite anxious.  She was crying with discomfort.  Ordered analgesic.  Will obtain repeat laboratory data including inflammatory markers given prior abnormal CT cervical spine.    Patient with persistent pain.  Given additional Dilaudid.    Laboratory data as noted.  She has a stable troponin.  She has elevated LFTs.  Ultrasound right upper quadrant does show a dilated common bile duct.  Could be related to her cholecystectomy.  Surprisingly are unremarkable even with missing dialysis.  No clinical evidence of volume overload.    Patient has ongoing pain given additional Dilaudid.    Given severity of symptoms, elevated inflammatory  markers, leukocytosis and prior abnormal MRI suggesting short-term interval repeat I will admit for pain management and MRI as well as spine consultation.  Consulted Dr. Sánchez.         DISPOSITION AND DISCUSSIONS  I have discussed management of the patient with the following physicians and MILAN's:  as noted above      FINAL IMPRESSION  1.  Intractable back pain with questionable myelopathy  2.  Abnormal liver function tests  3.  End-stage renal disease noncompliant with dialysis      This dictation was created using voice recognition software. The accuracy of the dictation is limited to the abilities of the software. I expect there may be some errors of grammar and possibly content. The nursing notes were reviewed and certain aspects of this information were incorporated into this note.    Electronically signed by: Tello Acevedo M.D., 1/22/2025

## 2025-01-23 ENCOUNTER — HOME CARE VISIT (OUTPATIENT)
Dept: HOME HEALTH SERVICES | Facility: HOME HEALTHCARE | Age: 70
End: 2025-01-23
Payer: MEDICARE

## 2025-01-23 PROBLEM — N18.4 CKD (CHRONIC KIDNEY DISEASE) STAGE 4, GFR 15-29 ML/MIN (HCC): Status: ACTIVE | Noted: 2025-01-23

## 2025-01-23 LAB
ALBUMIN SERPL BCP-MCNC: 2.8 G/DL (ref 3.2–4.9)
BUN SERPL-MCNC: 49 MG/DL (ref 8–22)
CALCIUM ALBUM COR SERPL-MCNC: 9 MG/DL (ref 8.5–10.5)
CALCIUM SERPL-MCNC: 8 MG/DL (ref 8.5–10.5)
CHLORIDE SERPL-SCNC: 111 MMOL/L (ref 96–112)
CO2 SERPL-SCNC: 19 MMOL/L (ref 20–33)
CREAT SERPL-MCNC: 2.66 MG/DL (ref 0.5–1.4)
GFR SERPLBLD CREATININE-BSD FMLA CKD-EPI: 19 ML/MIN/1.73 M 2
GLUCOSE BLD STRIP.AUTO-MCNC: 102 MG/DL (ref 65–99)
GLUCOSE BLD STRIP.AUTO-MCNC: 104 MG/DL (ref 65–99)
GLUCOSE BLD STRIP.AUTO-MCNC: 132 MG/DL (ref 65–99)
GLUCOSE BLD STRIP.AUTO-MCNC: 204 MG/DL (ref 65–99)
GLUCOSE BLD STRIP.AUTO-MCNC: 55 MG/DL (ref 65–99)
GLUCOSE BLD STRIP.AUTO-MCNC: 94 MG/DL (ref 65–99)
GLUCOSE BLD STRIP.AUTO-MCNC: 98 MG/DL (ref 65–99)
GLUCOSE SERPL-MCNC: 82 MG/DL (ref 65–99)
PHOSPHATE SERPL-MCNC: 4 MG/DL (ref 2.5–4.5)
POTASSIUM SERPL-SCNC: 5.6 MMOL/L (ref 3.6–5.5)
SODIUM SERPL-SCNC: 140 MMOL/L (ref 135–145)

## 2025-01-23 PROCEDURE — A9270 NON-COVERED ITEM OR SERVICE: HCPCS

## 2025-01-23 PROCEDURE — 82962 GLUCOSE BLOOD TEST: CPT | Mod: 91

## 2025-01-23 PROCEDURE — G0378 HOSPITAL OBSERVATION PER HR: HCPCS

## 2025-01-23 PROCEDURE — A9270 NON-COVERED ITEM OR SERVICE: HCPCS | Performed by: HOSPITALIST

## 2025-01-23 PROCEDURE — 700102 HCHG RX REV CODE 250 W/ 637 OVERRIDE(OP): Performed by: HOSPITALIST

## 2025-01-23 PROCEDURE — 80069 RENAL FUNCTION PANEL: CPT

## 2025-01-23 PROCEDURE — 700111 HCHG RX REV CODE 636 W/ 250 OVERRIDE (IP): Performed by: HOSPITALIST

## 2025-01-23 PROCEDURE — 700102 HCHG RX REV CODE 250 W/ 637 OVERRIDE(OP)

## 2025-01-23 PROCEDURE — 665999 HH PPS REVENUE DEBIT

## 2025-01-23 PROCEDURE — 700111 HCHG RX REV CODE 636 W/ 250 OVERRIDE (IP): Mod: JZ,TB

## 2025-01-23 PROCEDURE — 96372 THER/PROPH/DIAG INJ SC/IM: CPT

## 2025-01-23 PROCEDURE — 665998 HH PPS REVENUE CREDIT

## 2025-01-23 PROCEDURE — 99232 SBSQ HOSP IP/OBS MODERATE 35: CPT | Performed by: HOSPITALIST

## 2025-01-23 PROCEDURE — 36415 COLL VENOUS BLD VENIPUNCTURE: CPT

## 2025-01-23 PROCEDURE — 99214 OFFICE O/P EST MOD 30 MIN: CPT | Performed by: INTERNAL MEDICINE

## 2025-01-23 RX ORDER — GABAPENTIN 100 MG/1
200 CAPSULE ORAL
Status: DISCONTINUED | OUTPATIENT
Start: 2025-01-23 | End: 2025-01-28

## 2025-01-23 RX ORDER — METOPROLOL SUCCINATE 50 MG/1
50 TABLET, EXTENDED RELEASE ORAL EVERY EVENING
Status: DISCONTINUED | OUTPATIENT
Start: 2025-01-23 | End: 2025-02-05 | Stop reason: HOSPADM

## 2025-01-23 RX ORDER — FUROSEMIDE 40 MG/1
80 TABLET ORAL
Status: DISCONTINUED | OUTPATIENT
Start: 2025-01-23 | End: 2025-01-27

## 2025-01-23 RX ORDER — GABAPENTIN 100 MG/1
200 CAPSULE ORAL
Status: DISCONTINUED | OUTPATIENT
Start: 2025-01-24 | End: 2025-01-23

## 2025-01-23 RX ORDER — GABAPENTIN 100 MG/1
100 CAPSULE ORAL
Status: DISCONTINUED | OUTPATIENT
Start: 2025-01-24 | End: 2025-01-23

## 2025-01-23 RX ADMIN — OXYCODONE HYDROCHLORIDE 10 MG: 10 TABLET ORAL at 12:17

## 2025-01-23 RX ADMIN — HEPARIN SODIUM 5000 UNITS: 5000 INJECTION, SOLUTION INTRAVENOUS; SUBCUTANEOUS at 06:07

## 2025-01-23 RX ADMIN — OXYCODONE HYDROCHLORIDE 10 MG: 10 TABLET ORAL at 23:39

## 2025-01-23 RX ADMIN — TRAZODONE HYDROCHLORIDE 150 MG: 50 TABLET ORAL at 23:49

## 2025-01-23 RX ADMIN — METHOCARBAMOL TABLETS 750 MG: 750 TABLET, COATED ORAL at 23:53

## 2025-01-23 RX ADMIN — SENNOSIDES AND DOCUSATE SODIUM 2 TABLET: 50; 8.6 TABLET ORAL at 17:20

## 2025-01-23 RX ADMIN — METHOCARBAMOL TABLETS 750 MG: 750 TABLET, COATED ORAL at 12:17

## 2025-01-23 RX ADMIN — OXYCODONE HYDROCHLORIDE 10 MG: 10 TABLET ORAL at 02:47

## 2025-01-23 RX ADMIN — VENLAFAXINE HYDROCHLORIDE 150 MG: 75 CAPSULE, EXTENDED RELEASE ORAL at 06:07

## 2025-01-23 RX ADMIN — LEVOTHYROXINE SODIUM 250 MCG: 0.12 TABLET ORAL at 06:07

## 2025-01-23 RX ADMIN — GABAPENTIN 200 MG: 100 CAPSULE ORAL at 08:48

## 2025-01-23 RX ADMIN — ATORVASTATIN CALCIUM 40 MG: 40 TABLET, FILM COATED ORAL at 17:21

## 2025-01-23 RX ADMIN — INSULIN GLARGINE-YFGN 30 UNITS: 100 INJECTION, SOLUTION SUBCUTANEOUS at 17:22

## 2025-01-23 RX ADMIN — HEPARIN SODIUM 5000 UNITS: 5000 INJECTION, SOLUTION INTRAVENOUS; SUBCUTANEOUS at 23:20

## 2025-01-23 RX ADMIN — METHOCARBAMOL TABLETS 750 MG: 750 TABLET, COATED ORAL at 07:23

## 2025-01-23 RX ADMIN — AMLODIPINE BESYLATE 10 MG: 10 TABLET ORAL at 06:11

## 2025-01-23 RX ADMIN — INSULIN LISPRO 3 UNITS: 100 INJECTION, SOLUTION INTRAVENOUS; SUBCUTANEOUS at 17:23

## 2025-01-23 RX ADMIN — HYDROMORPHONE HYDROCHLORIDE 0.5 MG: 1 INJECTION, SOLUTION INTRAMUSCULAR; INTRAVENOUS; SUBCUTANEOUS at 19:39

## 2025-01-23 RX ADMIN — METOPROLOL SUCCINATE 50 MG: 50 TABLET, EXTENDED RELEASE ORAL at 17:20

## 2025-01-23 RX ADMIN — OXYCODONE HYDROCHLORIDE 10 MG: 10 TABLET ORAL at 17:21

## 2025-01-23 RX ADMIN — HEPARIN SODIUM 5000 UNITS: 5000 INJECTION, SOLUTION INTRAVENOUS; SUBCUTANEOUS at 14:30

## 2025-01-23 RX ADMIN — DULOXETINE 30 MG: 30 CAPSULE, DELAYED RELEASE ORAL at 06:07

## 2025-01-23 RX ADMIN — FUROSEMIDE 80 MG: 40 TABLET ORAL at 13:04

## 2025-01-23 RX ADMIN — OXYCODONE HYDROCHLORIDE 5 MG: 5 TABLET ORAL at 07:22

## 2025-01-23 ASSESSMENT — ENCOUNTER SYMPTOMS
CHILLS: 0
PAIN SEVERITY GOAL: 0/10
COUGH: 0
NAUSEA: 0
NECK PAIN: 1
FEVER: 0
VOMITING: 0
SHORTNESS OF BREATH: 0

## 2025-01-23 ASSESSMENT — COGNITIVE AND FUNCTIONAL STATUS - GENERAL
STANDING UP FROM CHAIR USING ARMS: A LITTLE
DRESSING REGULAR LOWER BODY CLOTHING: A LITTLE
SUGGESTED CMS G CODE MODIFIER MOBILITY: CK
MOBILITY SCORE: 19
TOILETING: A LITTLE
SUGGESTED CMS G CODE MODIFIER DAILY ACTIVITY: CJ
HELP NEEDED FOR BATHING: A LITTLE
WALKING IN HOSPITAL ROOM: A LITTLE
DAILY ACTIVITIY SCORE: 21
CLIMB 3 TO 5 STEPS WITH RAILING: A LOT
MOVING TO AND FROM BED TO CHAIR: A LITTLE

## 2025-01-23 ASSESSMENT — PATIENT HEALTH QUESTIONNAIRE - PHQ9
1. LITTLE INTEREST OR PLEASURE IN DOING THINGS: NOT AT ALL
SUM OF ALL RESPONSES TO PHQ9 QUESTIONS 1 AND 2: 0
2. FEELING DOWN, DEPRESSED, IRRITABLE, OR HOPELESS: NOT AT ALL

## 2025-01-23 ASSESSMENT — PAIN DESCRIPTION - PAIN TYPE
TYPE: ACUTE PAIN;CHRONIC PAIN
TYPE: ACUTE PAIN
TYPE: ACUTE PAIN;CHRONIC PAIN
TYPE: CHRONIC PAIN
TYPE: ACUTE PAIN

## 2025-01-23 NOTE — PROGRESS NOTES
Gave report to [place] [id number]   85 year old [facility] female, no PPH, PMH of T2DM on insulin, essential HTN, back pain, admitted for increased head pressure. Psych c/s for depression/coping after recent death of daughter.    Patient states that she is coping as best as she knows how after her daughter's death in [place] <1 week ago. Endorses depressed mood but feels well supportive by her extensive family. Finds solace/strength via prayer and by talking with friends as well. Patient denies changes in appetite/sleep, denies guilt or changes in concentration. Denies current or prior sx of bipolarity. Denies AH/Vh delusions or paranoia. Denies Si/I/P. Looks forward to seeing her 3rd great-great-grandson being born, still enjoy talking to and meeting with her several family members, has adult children 2 of whom have , has "countless" grandchildren and great-grand children, along with 2 great-great grandchildren the youngest of which is 1yo. Says she has "too much to live for", feels she is well supported by all. [place] supported by  who she saw immediately prior to hospitalization as well. Denies EtoH/drug abuse/use. AOx3

## 2025-01-23 NOTE — PROGRESS NOTES
Delay in starting 24 hour due to infrequent nature of patient's voiding. Still encouraging patient to void.

## 2025-01-23 NOTE — PROGRESS NOTES
Hypoglycemia Intervention    Hypoglycemia protocol intervention:  Blood glucose 48 at 0210, recheck 55 at 0216.  Intervention: 8 oz of fruit juice, ice cream   Repeat blood glucose 98 at 0235.  Intervention: Carb/Protein snack given, recheck blood glucose in 1 hour      Additional interventions needed: None  DANNY Dhaliwal notified of the above at 0221.

## 2025-01-23 NOTE — DISCHARGE PLANNING
TCN following. HTH/SCP chart review completed. Note that per CM and review, [facility] has accepted. Noted [facility] referral management team following. TCN will monitor if change in POC/status though will not actively be involved given comfort care status/hospice plan. Please reach out to TCN via VOALTE should GOC/dc plan warrant transitional care planning needs.

## 2025-01-23 NOTE — PROGRESS NOTES
Patient reports being in pain. Shouting out for someone to help her. I explained that I have medication for her. She said it hurts to move to take her meds.

## 2025-01-23 NOTE — PROGRESS NOTES
Nephrology Daily Progress Note    Date of Service  1/23/2025    Chief Complaint  69 y.o. female admitted 1/22/2025 with ESRD, bilateral arm weakness    Interval Problem Update  Patient is complaining of severe headache and neck pain    Review of Systems  Review of Systems   Constitutional:  Negative for chills, fever and malaise/fatigue.   Respiratory:  Negative for cough and shortness of breath.    Cardiovascular:  Negative for chest pain and leg swelling.   Gastrointestinal:  Negative for nausea and vomiting.   Genitourinary:  Negative for dysuria, frequency and urgency.   Musculoskeletal:  Positive for neck pain.        Physical Exam  Temp:  [36.3 °C (97.3 °F)-36.8 °C (98.3 °F)] 36.8 °C (98.3 °F)  Pulse:  [61-84] 61  Resp:  [15-17] 15  BP: ()/(53-89) 91/54  SpO2:  [94 %-97 %] 97 %    Physical Exam  Vitals and nursing note reviewed.   Constitutional:       General: She is awake. She is not in acute distress.     Appearance: She is well-developed. She is not ill-appearing or diaphoretic.   HENT:      Head: Normocephalic and atraumatic.      Right Ear: External ear normal.      Left Ear: External ear normal.      Nose: Nose normal. No rhinorrhea.      Mouth/Throat:      Pharynx: No oropharyngeal exudate or posterior oropharyngeal erythema.   Eyes:      General: No scleral icterus.        Right eye: No discharge.         Left eye: No discharge.      Conjunctiva/sclera: Conjunctivae normal.   Neck:      Vascular: No carotid bruit.   Cardiovascular:      Rate and Rhythm: Normal rate and regular rhythm.      Heart sounds: No murmur heard.  Pulmonary:      Effort: Pulmonary effort is normal. No respiratory distress.      Breath sounds: Normal breath sounds.   Abdominal:      General: Abdomen is flat. There is no distension.      Palpations: Abdomen is soft. There is no mass.   Musculoskeletal:         General: No tenderness.      Cervical back: No rigidity. No muscular tenderness.      Right lower leg: No edema.       Left lower leg: No edema.   Skin:     General: Skin is warm and dry.      Coloration: Skin is not jaundiced.   Neurological:      General: No focal deficit present.      Mental Status: She is alert and oriented to person, place, and time. Mental status is at baseline.   Psychiatric:         Mood and Affect: Mood normal.         Behavior: Behavior normal.         Thought Content: Thought content normal.         Fluids    Intake/Output Summary (Last 24 hours) at 1/23/2025 1252  Last data filed at 1/22/2025 2144  Gross per 24 hour   Intake 240 ml   Output --   Net 240 ml       Laboratory  Recent Labs     01/22/25  0559   WBC 13.8*   RBC 3.59*   HEMOGLOBIN 11.2*   HEMATOCRIT 34.9*   MCV 97.2   MCH 31.2   MCHC 32.1*   RDW 63.0*   PLATELETCT 236   MPV 10.2     Recent Labs     01/22/25  0732 01/23/25  0031   SODIUM 144 140   POTASSIUM 4.4 5.6*   CHLORIDE 111 111   CO2 22 19*   GLUCOSE 43* 82   BUN 52* 49*   CREATININE 2.55* 2.66*   CALCIUM 8.8 8.0*         Recent Labs     01/22/25  0732   NTPROBNP 2340*           Imaging  US-RUQ   Final Result      1.  Prior cholecystectomy.      2.  Common bile duct diameter measured at 12 mm with some intrahepatic biliary ductal dilatation. This may be related to presence of prior cholecystectomy.      3.  The liver is heterogeneous consistent with fatty change versus hepatocellular dysfunction.      DX-CHEST-PORTABLE (1 VIEW)   Final Result         1.  No acute cardiopulmonary disease.   2.  Atherosclerosis      MR-CERVICAL SPINE-WITH & W/O    (Results Pending)         Assessment/Plan  1 CKD unknown stage: Patient was presumed to be ESRD, however seems her kidney function has improved, patient has no uremic symptoms  2 hyperkalemia: Secondary to CKD and high potassium diet  3 neck pain  4 diabetes mellitus  5 anemia  no acute need for HD  Await 24-hour urine study results for creatinine clearance  Change diet to low potassium diet  Try 1 dose of Lasix to help with hyperkalemia  Daily  BMP, CBC.  Renal dose all meds  Avoid nephrotoxins like NSAIDs.  Prognosis guarded.  Plan discussed with Dr. Cabrales

## 2025-01-23 NOTE — PROGRESS NOTES
"Patient screaming out in pain \"Help me\" \"This isn't fair\" \"I came here for help, help me\" \"I can't take this\"  "

## 2025-01-23 NOTE — CONSULTS
DATE OF SERVICE:  01/22/2025     REQUESTING PHYSICIAN:  Dr. Cr Sánchez.     REASON FOR CONSULTATION:  Management of chronic kidney disease, assessing the   need for urgent dialysis.     The patient seen and examined, medical record reviewed.     HISTORY OF PRESENT ILLNESS:  The patient is a 69-year-old lady who is well   known to our service.  She has a history of longstanding diabetes, chronic   kidney disease secondary to diabetic nephropathy, endstage renal disease and   has been on dialysis for about a year, presented to the hospital earlier today   with bilateral arm pain.  Apparently been going on for few weeks, patient   missed dialysis for almost a week, so we were called to manage her kidney and   assess the need for urgent dialysis.     The patient has no chest pain or shortness of breath.     PAST MEDICAL HISTORY:  Significant for:  1.  Diabetes mellitus.  2.  Endstage renal disease.  3.  Diabetic nephropathy.  4.  Hypertension.     ALLERGIES:  No known drug allergies.     SOCIAL HISTORY:  The patient has 15 pack per year smoking history.     FAMILY HISTORY:  Positive for hypertension in her mother.     MEDICATIONS:  Reviewed.     REVIEW OF SYSTEMS:  All systems were reviewed; they were negative except   outlined in the history of present illness.     PHYSICAL EXAMINATION:  GENERAL:  The patient appears ill, but no apparent distress.  VITAL SIGNS:  Showed blood pressure of 149/82, heart rate was 75, respiratory   rate was 14.  HEENT:  Normocephalic, atraumatic.  Sclerae are anicteric.  Pupils are   reactive.  Nose normal.  Mucous membrane is moist.  NECK:  No lymphadenopathy, no JVD, no thyroid mass.  CHEST:  Normal.  LUNGS:  Clear to auscultation.  HEART:  S1, S2.  ABDOMEN:  Soft, nontender.  No hepatosplenomegaly.  There is no inguinal   lymphadenopathy.  EXTREMITIES:  There is trace lower extremity edema.  SKIN:  No skin rash.  NEUROLOGIC:  The patient is alert and oriented x3.  MOOD:  The patient is  anxious.     LABORATORY DATA:  Her recent labs from today were reviewed.  Again, her   creatinine is 2.5.     DIAGNOSTIC DATA:  The patient had a chest x-ray done earlier today.  I   reviewed the image myself, which showed no pulmonary edema.     ASSESSMENT:  1.  Endstage renal disease, however, the patient missed dialysis for the last   5 days.  Her creatinine is still in reasonably good range without any volume   overload, hyperkalemia or uremic symptoms.  I wonder whether the patient has   some mild renal recovery and she is not requiring dialysis.  2.  Anemia.  3.  Hypertension.  4.  Bilateral arm pain.  5.  Diabetes mellitus.     PLAN:  1.  There is no acute need for renal replacement therapy.  2.  Check 24-hour urine studies to calculate creatinine clearance.  3.  Renal diet.  4.  Renal dose all medications.  5.  Daily labs.  6.  Evaluate daily for the need of dialysis.  7.  Prognosis is guarded.     Plan discussed in detail with Dr. Sánchez.        ______________________________  FADI NAJJAR, MD FN/RAUDEL    DD:  01/22/2025 14:21  DT:  01/22/2025 18:26    Job#:  [id number]

## 2025-01-23 NOTE — ASSESSMENT & PLAN NOTE
Continue on torsemide 100 mg daily  Repeat BMP in a.m. to monitor renal function  Nephrology following for dialysis needs

## 2025-01-23 NOTE — PROGRESS NOTES
Spoke with MRI  because there is some concern about using contrast with the patient not getting dialysis today. Per hospitalist, contrast is needed for this MRI.

## 2025-01-23 NOTE — PROGRESS NOTES
Bedside shift report received from night shift RN. Patient A&Ox4, in bed sleeping soundly. Bed in lowest, locked position with call light and belongings within reach. Fall precautions reviewed with patient. Patient updated on POC.

## 2025-01-23 NOTE — PROGRESS NOTES
Hypoglycemia Intervention    Hypoglycemia protocol intervention:  Blood glucose 68 at 2118, rechecked to be 64 at 2121.  Intervention: 8 oz of fruit juice   Repeat blood glucose 83 at 2144.  Intervention: Carb/Protein snack given, recheck blood glucose in 1 hour      Additional interventions needed: None  DANNY Dhaliwal notified of the above at 2310.

## 2025-01-23 NOTE — PROGRESS NOTES
Patient arrived on unit is hospital bed. Patient Aox4 and reports 7/10 pain in left arm. Patient is on room air. Patient is x1 assist to the bathroom. Patient is high fall risk. Patient on a renal diet. Patient bed is in low, locked position with bed alarm on. Standard fall precautions are in place. Personal belonging and call light are within reach. Patient reinforced to use call light when needed.

## 2025-01-23 NOTE — CARE PLAN
The patient is Watcher - Medium risk of patient condition declining or worsening    Shift Goals  Clinical Goals: pain management, MRI  Patient Goals: Pain management, sleep  Family Goals: RILEY    Progress made toward(s) clinical / shift goals:    Problem: Pain - Standard  Goal: Alleviation of pain or a reduction in pain to the patient’s comfort goal  1/23/2025 0449 by Sita Quinn R.N.  Outcome: Progressing  Note: Pt able to sleep comfortable with PO pain medication. Pt in 9/10 pain with movement, pain comes in waves.   1/23/2025 0448 by Sita Quinn R.N.  Outcome: Progressing       Patient is not progressing towards the following goals:      Problem: Acute Care of the Diabetic Patient  Goal: Optimal Outcome for the Diabetic Patient  Outcome: Not Progressing  Note: Pt required multiple blood sugar corrections over shift due to low BGL 48-64. Pt remained arousable and oriented. Pt educated on need to check and control blood sugar. Insulin held.

## 2025-01-23 NOTE — CARE PLAN
The patient is Stable - Low risk of patient condition declining or worsening    Shift Goals  Clinical Goals: Patinet will maintain pain level of 3/10 pain or below during course of shift with PRN pain medications as per ordered.  Patient Goals: pain, rest  Family Goals: RILEY    Progress made toward(s) clinical / shift goals:      Problem: Knowledge Deficit - Standard  Goal: Patient and family/care givers will demonstrate understanding of plan of care, disease process/condition, diagnostic tests and medications  Outcome: Progressing    Patient educated on plan of care, medications, and procedures. Patient is Aox4. Patient verbalizes understanding of care. Will continue to update patient on Plan of care during shift.     Problem: Pain - Standard  Goal: Alleviation of pain or a reduction in pain to the patient’s comfort goal  Outcome: Progressing     Patient had 7/10 pain in left arm. Patient pain relieved with PRN pain medication as per ordered, rest, and positioning.     Problem: Fall Risk  Goal: Patient will remain free from falls  Outcome: Progressing    Patient is moderate fall risk. Patient remained free of falls during shift. Patient is x1 assist with hand held to the bathroom. Patient bed is in low, locked position with bed alarm on. Standard fall precautions are in place. Personal belonging and call light are within reach. Patient reinforced to use call light when needed.    Patient is not progressing towards the following goals:

## 2025-01-23 NOTE — HOSPICE
[facility] referral/consult response    Is this patient accepted to [facility]?: no  What hospice level of care?: routine  Approved by provider: Dr. Stinson    Anticipated DC date: ?  DC Barriers:   Additional Information:  Pt is no longer accepted to [facility] since she is no longer ESRD per nephrology.  Pt was being seen by home palliative care for pain management, she can continue with them for pain control.  Hospice is signing off, please re-refer if her condition worsens.

## 2025-01-23 NOTE — PROGRESS NOTES
[facility] Medicine Daily Progress Note    Date of Service  1/23/2025    Chief Complaint  Anu Manuel is a 69 y.o. female admitted 1/22/2025 with neck/arm pain    Hospital Course  Anu Manuel is a 69 y.o. female who presented 1/22/2025 with bilateral arm pain.  Ms. Manuel has a past medical history of insulin-dependent diabetes mellitus, end-stage renal disease on dialysis via a right tunneled catheter, coronary artery disease that was most recently admitted here December 31 through January 3 with acute on chronic back pain and then was admitted again from 1/7/2025 through 1/8/2025.  She presents today with bilateral arm pain she feels is coming from her neck and cannot go to dialysis because of it and complains of hopelessness.  Upon review of the records the MRI of the cervical spine from 1/8/2025 revealed abnormal bone marrow signal with raising the possibility of infection and short-term follow-up imaging was recommended.  Ms. Manuel received IV Dilaudid in the emergency room will be placed under observation status with oral oxycodone for pain management for now.  MRI has been ordered to repeat with contrast and she will have dialysis by nephrology as well.    Interval Problem Update  Patient complaining of neck and arm pain    Screaming out into the hallways    MRI of the neck is pending    Discussed with Dr. Lovely MENDEZ-24-hour urine in process    No immediate plan for dialysis at this time    I have discussed this patient's plan of care and discharge plan at IDT rounds today with Case Management, Nursing, Nursing leadership, and other members of the IDT team.    Consultants/Specialty      Code Status  DNAR/DNI    Disposition  The patient is not medically cleared for discharge to home or a post-acute facility.  Anticipate discharge to: home with close outpatient follow-up    I have placed the appropriate orders for post-discharge needs.    Review of Systems  Review of Systems   Unable to  perform ROS: Medical condition        Physical Exam  Temp:  [36.3 °C (97.3 °F)-36.8 °C (98.3 °F)] 36.8 °C (98.3 °F)  Pulse:  [61-84] 61  Resp:  [15-17] 15  BP: ()/(53-89) 91/54  SpO2:  [94 %-97 %] 97 %    Physical Exam  Constitutional:       General: She is in acute distress.      Appearance: She is ill-appearing.   HENT:      Head: Atraumatic.   Cardiovascular:      Rate and Rhythm: Normal rate and regular rhythm.      Pulses: Normal pulses.   Pulmonary:      Effort: No respiratory distress.      Breath sounds: No stridor. No wheezing or rhonchi.   Abdominal:      General: There is no distension.      Palpations: There is no mass.      Tenderness: There is no abdominal tenderness.      Hernia: No hernia is present.   Musculoskeletal:      Right lower leg: No edema.      Left lower leg: No edema.   Skin:     Capillary Refill: Capillary refill takes 2 to 3 seconds.   Neurological:      Motor: Weakness present.   Psychiatric:      Comments: sedated         Fluids    Intake/Output Summary (Last 24 hours) at 1/23/2025 1120  Last data filed at 1/22/2025 2144  Gross per 24 hour   Intake 300 ml   Output --   Net 300 ml        Laboratory  Recent Labs     01/22/25  0559   WBC 13.8*   RBC 3.59*   HEMOGLOBIN 11.2*   HEMATOCRIT 34.9*   MCV 97.2   MCH 31.2   MCHC 32.1*   RDW 63.0*   PLATELETCT 236   MPV 10.2     Recent Labs     01/22/25  0732 01/23/25  0031   SODIUM 144 140   POTASSIUM 4.4 5.6*   CHLORIDE 111 111   CO2 22 19*   GLUCOSE 43* 82   BUN 52* 49*   CREATININE 2.55* 2.66*   CALCIUM 8.8 8.0*                   Imaging  US-RUQ   Final Result      1.  Prior cholecystectomy.      2.  Common bile duct diameter measured at 12 mm with some intrahepatic biliary ductal dilatation. This may be related to presence of prior cholecystectomy.      3.  The liver is heterogeneous consistent with fatty change versus hepatocellular dysfunction.      DX-CHEST-PORTABLE (1 VIEW)   Final Result         1.  No acute cardiopulmonary  disease.   2.  Atherosclerosis      MR-CERVICAL SPINE-WITH & W/O    (Results Pending)        Assessment/Plan  * Radiculopathy affecting upper extremity- (present on admission)  Assessment & Plan  Her pain has been debilitating despite Neurontin and Cymbalta.  She had the abnormal MRI noted below.  Because of this the MRI will be repeated as she may benefit from spine surgery consultation for either inpatient or outpatient management based on the results.  .  She will be given oral narcotics and no further IV narcotics at this time in order to start the transition of discharge home eventually on oral medications.  1.  Postoperative changes noted in the cervical spine with posterior lateral mass fusion between C4 and C7.     2.  Abnormal bone marrow signal abnormal enhancement identified at the C6-C7 level involving the adjacent endplates and the entirety of the C7 vertebral body. Slightly increased T2 signal is also noted within the disc space at this level with minimal   enhancement. However, this area did demonstrate a sclerotic appearance on previous CT. Minimal abnormal signal is present also within the prevertebral soft tissues at this level. A new infectious process cannot be completely excluded. Recommend   correlation with history, physical exam and consider short-term follow-up imaging.     3.  Moderate canal stenosis is noted at C6-C7.        Exam Ended: 01/08/25  9:50 AM       CKD (chronic kidney disease) stage 4, GFR 15-29 ml/min ([facility])- (present on admission)  Assessment & Plan  Avoid nephrotoxins.    24 Urine in process.    Hospice eval    Renal md following    Type 2 diabetes mellitus, with long-term current use of insulin ([facility])- (present on admission)  Assessment & Plan  Continue glargine and add sliding scale with a diabetic diet    DNR (do not resuscitate)- (present on admission)  Assessment & Plan  Per her wishes  She has been followed by palliative care in the past    Referred to hospice    ESRD  needing dialysis ([facility])- (present on admission)  Assessment & Plan  She has a left arm fistula  Nephrology will be consulted for dialysis    CAD S/P percutaneous coronary angioplasty- (present on admission)  Assessment & Plan  History of    Primary hypertension- (present on admission)  Assessment & Plan  Continue Norvasc and Toprol with holding parameters         VTE prophylaxis: VTE Selection    I have performed a physical exam and reviewed and updated ROS and Plan today (1/23/2025). In review of yesterday's note (1/22/2025), there are no changes except as documented above.      Greater than 36 minutes spent prepping to see patient (e.g. review of tests) obtaining and/or reviewing separately obtained history. Performing a medically appropriate examination and/ evaluation.  Counseling and educating the patient/family/caregiver.  Ordering medications, tests, or procedures.  Referring and communicating with other health care professionals.  Documenting clinical information in EPIC.  Independently interpreting results and communicating results to patient/family/caregiver.  Care coordination.

## 2025-01-23 NOTE — RESPIRATORY CARE
COPD EDUCATION by COPD CLINICAL EDUCATOR  1/23/2025 at 8:49 AM by Manisha De Anda, MARK     Patient reviewed by COPD education team. Patient does not have a history or diagnosis of COPD .Therefore, patient does not qualify for the COPD program. Pt offered smoking cessation which she declined. Does not have home oxygen or

## 2025-01-23 NOTE — HOSPITAL COURSE
Anu Manuel is a 69 y.o. female with past medical history of insulin-dependent diabetes mellitus, ESRD on dialysis, CAD, recent admission for acute on chronic back pain who presented 1/22/2025 with bilateral arm pain.  MRI of the cervical spine from 1/8/2025 revealed abnormal bone marrow signal with raising the possibility of infection and short-term follow-up imaging was recommended.  MRI C-spine is ordered  for further evaluation.  Nephrology recommending no further dialysis as patient's kidney function has improved and has no uremic symptoms.  Pending MRI C-spine for further evaluation.

## 2025-01-23 NOTE — DISCHARGE PLANNING
Referral Management Team    Received hospice referral via [employer].    Choice obtained for: [facility]   Agency acceptance status: Referral sent     Family involved in care:  Farzaneh ( Daughter) [phone]  Support available at home: family   Placement needed:   Referral sent to [facility] for GIP. Patient goal is to discharge home but needs help managing pain.     Barriers to discharge:  Pending Hospice acceptance.      1330 Referral declined by [facility] since she is no longer ESRD per nephrology.  Pt was being seen by home palliative care for pain management, she can continue with them for pain control.  Hospice is signing off, please re-refer if her condition worsens.     4174 RMT Liaison meet with patient and informed of hospice decision  and inquired about second choice. Patient declined hospice and stated she wants to go home.     Updated care team. Please contact me directly if questions arise.

## 2025-01-24 LAB
ANION GAP SERPL CALC-SCNC: 12 MMOL/L (ref 7–16)
BUN SERPL-MCNC: 58 MG/DL (ref 8–22)
CALCIUM SERPL-MCNC: 8 MG/DL (ref 8.5–10.5)
CHLORIDE SERPL-SCNC: 109 MMOL/L (ref 96–112)
CO2 SERPL-SCNC: 17 MMOL/L (ref 20–33)
CREAT SERPL-MCNC: 3.12 MG/DL (ref 0.5–1.4)
ERYTHROCYTE [DISTWIDTH] IN BLOOD BY AUTOMATED COUNT: 65.4 FL (ref 35.9–50)
GFR SERPLBLD CREATININE-BSD FMLA CKD-EPI: 16 ML/MIN/1.73 M 2
GLUCOSE BLD STRIP.AUTO-MCNC: 121 MG/DL (ref 65–99)
GLUCOSE BLD STRIP.AUTO-MCNC: 136 MG/DL (ref 65–99)
GLUCOSE BLD STRIP.AUTO-MCNC: 147 MG/DL (ref 65–99)
GLUCOSE BLD STRIP.AUTO-MCNC: 159 MG/DL (ref 65–99)
GLUCOSE BLD STRIP.AUTO-MCNC: 180 MG/DL (ref 65–99)
GLUCOSE BLD STRIP.AUTO-MCNC: 95 MG/DL (ref 65–99)
GLUCOSE SERPL-MCNC: 51 MG/DL (ref 65–99)
HCT VFR BLD AUTO: 36.7 % (ref 37–47)
HGB BLD-MCNC: 11.5 G/DL (ref 12–16)
MCH RBC QN AUTO: 30.9 PG (ref 27–33)
MCHC RBC AUTO-ENTMCNC: 31.3 G/DL (ref 32.2–35.5)
MCV RBC AUTO: 98.7 FL (ref 81.4–97.8)
PLATELET # BLD AUTO: 166 K/UL (ref 164–446)
PMV BLD AUTO: 9.8 FL (ref 9–12.9)
POTASSIUM SERPL-SCNC: 5.1 MMOL/L (ref 3.6–5.5)
RBC # BLD AUTO: 3.72 M/UL (ref 4.2–5.4)
SODIUM SERPL-SCNC: 138 MMOL/L (ref 135–145)
WBC # BLD AUTO: 9.4 K/UL (ref 4.8–10.8)

## 2025-01-24 PROCEDURE — 80048 BASIC METABOLIC PNL TOTAL CA: CPT

## 2025-01-24 PROCEDURE — 700111 HCHG RX REV CODE 636 W/ 250 OVERRIDE (IP): Performed by: HOSPITALIST

## 2025-01-24 PROCEDURE — A9270 NON-COVERED ITEM OR SERVICE: HCPCS | Performed by: HOSPITALIST

## 2025-01-24 PROCEDURE — 96372 THER/PROPH/DIAG INJ SC/IM: CPT

## 2025-01-24 PROCEDURE — 85027 COMPLETE CBC AUTOMATED: CPT

## 2025-01-24 PROCEDURE — 700102 HCHG RX REV CODE 250 W/ 637 OVERRIDE(OP)

## 2025-01-24 PROCEDURE — 96376 TX/PRO/DX INJ SAME DRUG ADON: CPT

## 2025-01-24 PROCEDURE — 36415 COLL VENOUS BLD VENIPUNCTURE: CPT

## 2025-01-24 PROCEDURE — 99232 SBSQ HOSP IP/OBS MODERATE 35: CPT | Performed by: INTERNAL MEDICINE

## 2025-01-24 PROCEDURE — 700102 HCHG RX REV CODE 250 W/ 637 OVERRIDE(OP): Performed by: HOSPITALIST

## 2025-01-24 PROCEDURE — A9270 NON-COVERED ITEM OR SERVICE: HCPCS

## 2025-01-24 PROCEDURE — 665998 HH PPS REVENUE CREDIT

## 2025-01-24 PROCEDURE — 770006 HCHG ROOM/CARE - MED/SURG/GYN SEMI*

## 2025-01-24 PROCEDURE — 700111 HCHG RX REV CODE 636 W/ 250 OVERRIDE (IP): Mod: TB

## 2025-01-24 PROCEDURE — 82962 GLUCOSE BLOOD TEST: CPT

## 2025-01-24 PROCEDURE — 99233 SBSQ HOSP IP/OBS HIGH 50: CPT | Performed by: STUDENT IN AN ORGANIZED HEALTH CARE EDUCATION/TRAINING PROGRAM

## 2025-01-24 PROCEDURE — 665999 HH PPS REVENUE DEBIT

## 2025-01-24 RX ADMIN — HYDROMORPHONE HYDROCHLORIDE 0.5 MG: 1 INJECTION, SOLUTION INTRAMUSCULAR; INTRAVENOUS; SUBCUTANEOUS at 03:45

## 2025-01-24 RX ADMIN — DULOXETINE 30 MG: 30 CAPSULE, DELAYED RELEASE ORAL at 05:12

## 2025-01-24 RX ADMIN — HYDROMORPHONE HYDROCHLORIDE 0.5 MG: 1 INJECTION, SOLUTION INTRAMUSCULAR; INTRAVENOUS; SUBCUTANEOUS at 19:37

## 2025-01-24 RX ADMIN — GABAPENTIN 200 MG: 100 CAPSULE ORAL at 05:11

## 2025-01-24 RX ADMIN — HYDROMORPHONE HYDROCHLORIDE 0.5 MG: 1 INJECTION, SOLUTION INTRAMUSCULAR; INTRAVENOUS; SUBCUTANEOUS at 11:56

## 2025-01-24 RX ADMIN — METHOCARBAMOL TABLETS 750 MG: 750 TABLET, COATED ORAL at 20:23

## 2025-01-24 RX ADMIN — HYDROMORPHONE HYDROCHLORIDE 0.5 MG: 1 INJECTION, SOLUTION INTRAMUSCULAR; INTRAVENOUS; SUBCUTANEOUS at 00:41

## 2025-01-24 RX ADMIN — FUROSEMIDE 80 MG: 40 TABLET ORAL at 05:12

## 2025-01-24 RX ADMIN — OXYCODONE HYDROCHLORIDE 10 MG: 10 TABLET ORAL at 02:46

## 2025-01-24 RX ADMIN — VENLAFAXINE HYDROCHLORIDE 150 MG: 75 CAPSULE, EXTENDED RELEASE ORAL at 05:12

## 2025-01-24 RX ADMIN — INSULIN LISPRO 2 UNITS: 100 INJECTION, SOLUTION INTRAVENOUS; SUBCUTANEOUS at 08:03

## 2025-01-24 RX ADMIN — OXYCODONE HYDROCHLORIDE 10 MG: 10 TABLET ORAL at 17:54

## 2025-01-24 RX ADMIN — OXYCODONE HYDROCHLORIDE 10 MG: 10 TABLET ORAL at 14:31

## 2025-01-24 RX ADMIN — POLYETHYLENE GLYCOL 3350 1 PACKET: 17 POWDER, FOR SOLUTION ORAL at 05:12

## 2025-01-24 RX ADMIN — HYDROMORPHONE HYDROCHLORIDE 0.5 MG: 1 INJECTION, SOLUTION INTRAMUSCULAR; INTRAVENOUS; SUBCUTANEOUS at 15:22

## 2025-01-24 RX ADMIN — OXYCODONE HYDROCHLORIDE 10 MG: 10 TABLET ORAL at 10:06

## 2025-01-24 RX ADMIN — OXYCODONE HYDROCHLORIDE 10 MG: 10 TABLET ORAL at 05:42

## 2025-01-24 RX ADMIN — ATORVASTATIN CALCIUM 40 MG: 40 TABLET, FILM COATED ORAL at 17:54

## 2025-01-24 RX ADMIN — TRAZODONE HYDROCHLORIDE 150 MG: 50 TABLET ORAL at 20:23

## 2025-01-24 RX ADMIN — INSULIN LISPRO 2 UNITS: 100 INJECTION, SOLUTION INTRAVENOUS; SUBCUTANEOUS at 17:54

## 2025-01-24 RX ADMIN — ACETAMINOPHEN 650 MG: 325 TABLET ORAL at 11:47

## 2025-01-24 RX ADMIN — LEVOTHYROXINE SODIUM 250 MCG: 0.12 TABLET ORAL at 05:12

## 2025-01-24 RX ADMIN — AMLODIPINE BESYLATE 10 MG: 10 TABLET ORAL at 05:12

## 2025-01-24 RX ADMIN — METOPROLOL SUCCINATE 50 MG: 50 TABLET, EXTENDED RELEASE ORAL at 17:54

## 2025-01-24 RX ADMIN — HEPARIN SODIUM 5000 UNITS: 5000 INJECTION, SOLUTION INTRAVENOUS; SUBCUTANEOUS at 15:22

## 2025-01-24 RX ADMIN — HEPARIN SODIUM 5000 UNITS: 5000 INJECTION, SOLUTION INTRAVENOUS; SUBCUTANEOUS at 08:03

## 2025-01-24 RX ADMIN — SENNOSIDES AND DOCUSATE SODIUM 2 TABLET: 50; 8.6 TABLET ORAL at 17:58

## 2025-01-24 ASSESSMENT — PATIENT HEALTH QUESTIONNAIRE - PHQ9
SUM OF ALL RESPONSES TO PHQ9 QUESTIONS 1 AND 2: 0
2. FEELING DOWN, DEPRESSED, IRRITABLE, OR HOPELESS: NOT AT ALL
1. LITTLE INTEREST OR PLEASURE IN DOING THINGS: NOT AT ALL
1. LITTLE INTEREST OR PLEASURE IN DOING THINGS: NOT AT ALL
SUM OF ALL RESPONSES TO PHQ9 QUESTIONS 1 AND 2: 0
2. FEELING DOWN, DEPRESSED, IRRITABLE, OR HOPELESS: NOT AT ALL

## 2025-01-24 ASSESSMENT — PAIN DESCRIPTION - PAIN TYPE
TYPE: ACUTE PAIN

## 2025-01-24 ASSESSMENT — ENCOUNTER SYMPTOMS
SHORTNESS OF BREATH: 0
WEAKNESS: 1
CHILLS: 0
COUGH: 0
NAUSEA: 0
VOMITING: 0
FEVER: 0

## 2025-01-24 NOTE — DISCHARGE PLANNING
"HTH/SCP TCN chart review completed. Collaborated with CM prior to meeting with the pt. The most current review of medical record, knowledge of pt's PLOF and social support, LACE+ score of 79, 6 clicks scores of 19 mobility were considered.      Pt seen at bedside. Introduced TCN program. Provided education regarding post acute levels of care. Education provided regarding case management policy for blanket SNF referrals. Discussed HTH/SCP plan benefits. Pt verbalizes understanding.     Noted per Referral Management Team note, \"Referral declined by [facility]...Liaison meet with patient and informed of hospice decision and inquired about second choice. Patient declined hospice and stated she wants to go home.\"  Noticed per Episodes in chart as well as chart review, hospice episode still remains active at this time.     Patient reports that she has no concerns with discharge home, reports no concerns with ambulation in terms of discharging home.  Per kardex patient ambulated 2x10 feet with no AD.  Patient states that she had [facility], and would like to resume it.  TCN reached out to [facility] to clarify if [facility] will accept.  If not, patient agreeable to [facility] .  No further TCN needs at this time.     Choice proactively obtained for HH and faxed to [facility]. Discharge considerations are anticipated to be home with home health. TCN will continue to follow and collaborate with discharge planning team as additional post acute needs arise. Thank you.     Completed today:  Choice obtained: HH (1. [facility] 2. [facility])  Pt aware of [facility] blanket referral policy  SCP with Non-[facility] PCP.     "

## 2025-01-24 NOTE — PROGRESS NOTES
Patient report received and assumed care. Assessed patient at 0715. Patient is Aox4 and reports 3/10 pain in left arm. Patient has HD cath in right chest and has previous Av fistula in left arm (no longer in use). Patient is on renal diet. Patient has diabetic foot ulcer on the left heel and right big toe. Patient is hand held assist to the bathroom. Patient is oliguric. Patient bed is in low, locked position with bed alarm on. Standard fall precautions are in place. Personal belonging and call light are within reach. Patient reinforced to use call light when needed.

## 2025-01-24 NOTE — CARE PLAN
The patient is Stable - Low risk of patient condition declining or worsening    Shift Goals  Clinical Goals: pain management, alternative calming techniques, monitor blood sugar  Patient Goals: pain management, rest  Family Goals: RILEY    Progress made toward(s) clinical / shift goals:    Problem: Pain - Standard  Goal: Alleviation of pain or a reduction in pain to the patient’s comfort goal  Outcome: Progressing  Note: Pt screaming out in pain, relieved temporarily with PO and IV medications.      Problem: Knowledge Deficit - Standard  Goal: Patient and family/care givers will demonstrate understanding of plan of care, disease process/condition, diagnostic tests and medications  Outcome: Progressing     Problem: Fall Risk  Goal: Patient will remain free from falls  Note: Fall precautions in place, moderate fall risk.        Patient is not progressing towards the following goals:

## 2025-01-24 NOTE — PROGRESS NOTES
Lab called with a critical lab result of blood glucose 51 at 0332. Result read back to lab.    DANNY Tilley notified at 0337. Pt Aox4. Intervention performed. Continuing with current plan of care.     Hypoglycemia Intervention    Hypoglycemia protocol intervention:  Blood glucose 51 at 0332.  Intervention: 8 oz of fruit juice, lamin  Repeat blood glucose 95 at 0359.  Intervention: Carb/Protein snack given, recheck blood glucose in 1 hour    Additional interventions needed: None  DANNY Tilley notified of the above at 0337.

## 2025-01-24 NOTE — CARE PLAN
The patient is Stable - Low risk of patient condition declining or worsening    Shift Goals  Clinical Goals: Patient will maintain pain level of 5/10 or less during course of shift with PRn pain medicaiton as per ordered.  Patient Goals: pain, MRI  Family Goals: RILEY    Progress made toward(s) clinical / shift goals:      Problem: Knowledge Deficit - Standard  Goal: Patient and family/care givers will demonstrate understanding of plan of care, disease process/condition, diagnostic tests and medications  Outcome: Progressing    Patient educated on plan of care, medications, and procedures. Patient is Aox4. Patient verbalizes understanding of care. Will continue to update patient on Plan of care during shift.     Problem: Pain - Standard  Goal: Alleviation of pain or a reduction in pain to the patient’s comfort goal  Outcome: Progressing    Patient pain ranged from 7/10 to 3/10. Pain managed with oxycodone q 3 hr followed with 0.5 dilaudid post 1 hr. Patient pain located in left arm. Patient describes pain as nagging sharp, aching, and constant.      Problem: Fall Risk  Goal: Patient will remain free from falls  Outcome: Progressing    Patient remained free from falls. Patient ambulated to bathroom once during shift. Patient is a hand held assist. Patient is moderate fall risk. Patient bed is in low, locked position with bed alarm on. Standard fall precautions are in place. Personal belonging and call light are within reach. Patient reinforced to use call light when needed.     Problem: Acute Care of the Diabetic Patient  Goal: Optimal Outcome for the Diabetic Patient  Outcome: Progressing     Patient FSBG this morning 159, Patient given 2 unit of insulin. Patient FSBG at lunch was 136, no insulin given. Patient is on a diabetic diet.

## 2025-01-24 NOTE — PROGRESS NOTES
Assumed care of pt at 1900. Report received and bedside rounding completed with day RN. Pt is calm no SOB, no acute distress noted.    POC discussed with pt, questions answered. White board updated. Call light and pt belongings within reach - safety precautions and hourly rounding in place. See flowsheets for assessment.

## 2025-01-24 NOTE — PROGRESS NOTES
Nephrology Daily Progress Note    Date of Service  1/24/2025    Chief Complaint  69 y.o. female admitted 1/22/2025 with ESRD, bilateral arm weakness    Interval Problem Update  Patient is complaining of severe headache and neck pain  1/24 patient feels better today   unfortunately 24-hour urine was not being collected  We will start 24-hour urine study for creatinine clearance today    Review of Systems  Review of Systems   Constitutional:  Positive for malaise/fatigue. Negative for chills and fever.   Respiratory:  Negative for cough and shortness of breath.    Cardiovascular:  Negative for chest pain and leg swelling.   Gastrointestinal:  Negative for nausea and vomiting.   Genitourinary:  Negative for dysuria, frequency and urgency.        Physical Exam  Temp:  [36.2 °C (97.2 °F)-36.7 °C (98 °F)] 36.2 °C (97.2 °F)  Pulse:  [63-66] 64  Resp:  [13-18] 13  BP: (119-129)/(67-91) 122/67  SpO2:  [93 %-97 %] 97 %    Physical Exam  Vitals and nursing note reviewed.   Constitutional:       General: She is not in acute distress.     Appearance: Normal appearance. She is well-developed. She is not diaphoretic.   HENT:      Head: Normocephalic and atraumatic.      Right Ear: External ear normal.      Left Ear: External ear normal.      Nose: Nose normal.   Eyes:      General: No scleral icterus.        Right eye: No discharge.         Left eye: No discharge.      Conjunctiva/sclera: Conjunctivae normal.   Cardiovascular:      Rate and Rhythm: Normal rate and regular rhythm.      Heart sounds: No murmur heard.  Pulmonary:      Effort: Pulmonary effort is normal. No respiratory distress.      Breath sounds: Normal breath sounds.   Musculoskeletal:         General: No tenderness.      Right lower leg: No edema.      Left lower leg: No edema.   Skin:     General: Skin is warm and dry.      Findings: No erythema.   Neurological:      General: No focal deficit present.      Mental Status: She is alert and oriented to person,  place, and time.      Cranial Nerves: No cranial nerve deficit.   Psychiatric:         Mood and Affect: Mood normal.         Behavior: Behavior normal.         Fluids    Intake/Output Summary (Last 24 hours) at 1/24/2025 1228  Last data filed at 1/23/2025 2000  Gross per 24 hour   Intake 120 ml   Output --   Net 120 ml       Laboratory  Recent Labs     01/22/25  0559 01/24/25  0236   WBC 13.8* 9.4   RBC 3.59* 3.72*   HEMOGLOBIN 11.2* 11.5*   HEMATOCRIT 34.9* 36.7*   MCV 97.2 98.7*   MCH 31.2 30.9   MCHC 32.1* 31.3*   RDW 63.0* 65.4*   PLATELETCT 236 166   MPV 10.2 9.8     Recent Labs     01/22/25  0732 01/23/25  0031 01/24/25  0236   SODIUM 144 140 138   POTASSIUM 4.4 5.6* 5.1   CHLORIDE 111 111 109   CO2 22 19* 17*   GLUCOSE 43* 82 51*   BUN 52* 49* 58*   CREATININE 2.55* 2.66* 3.12*   CALCIUM 8.8 8.0* 8.0*         Recent Labs     01/22/25  0732   NTPROBNP 2340*           Imaging  US-RUQ   Final Result      1.  Prior cholecystectomy.      2.  Common bile duct diameter measured at 12 mm with some intrahepatic biliary ductal dilatation. This may be related to presence of prior cholecystectomy.      3.  The liver is heterogeneous consistent with fatty change versus hepatocellular dysfunction.      DX-CHEST-PORTABLE (1 VIEW)   Final Result         1.  No acute cardiopulmonary disease.   2.  Atherosclerosis      MR-CERVICAL SPINE-WITH & W/O    (Results Pending)         Assessment/Plan  1 CKD unknown stage: Patient was presumed to be ESRD, however seems her kidney function has improved, patient has no uremic symptoms  2 hyperkalemia: Secondary to CKD and high potassium diet, improved  3 neck pain  4 diabetes mellitus  5 anemia  no acute need for HD  Measure creatinine clearance  Renal diet  Daily BMP, CBC.  Renal dose all meds  Avoid nephrotoxins like NSAIDs.

## 2025-01-24 NOTE — PROGRESS NOTES
4 Eyes Skin Assessment Completed by MINI Bragg and Aishwarya RN.    Head WDL  Ears WDL  Nose WDL  Mouth WDL  Neck WDL  Breast/Chest WDL  Shoulder Blades WDL  Spine WDL  (R) Arm/Elbow/Hand Bruising  (L) Arm/Elbow/Hand Bruising  Abdomen WDL  Groin WDL  Scrotum/Coccyx/Buttocks WDL  (R) Leg WDL  (L) Leg WDL  (R) Heel/Foot/Toe Discoloration and necrosis and flaky heel and toe  (L) Heel/Foot/Toe dry, cracked, and fissure on heel          Devices In Places N/A      Interventions In Place Pillows and Heels Loaded W/Pillows    Possible Skin Injury Yes    Pictures Uploaded Into Epic Yes  Wound Consult Placed Yes  RN Wound Prevention Protocol Ordered Yes

## 2025-01-24 NOTE — PROGRESS NOTES
"Pt experiencing multiple severe episodes of stabbing pain in left arm, pt screaming and crying, able to hear from hallway. \"Oh god make it stop! Oh god!\" Pain temporarily relieved by narcotics, IV breakthrough medication necessary. Pt able to sleep intermittently and then wakes up screaming again.   "

## 2025-01-25 DIAGNOSIS — E89.0 HYPOTHYROIDISM, POSTSURGICAL: ICD-10-CM

## 2025-01-25 LAB
ANION GAP SERPL CALC-SCNC: 11 MMOL/L (ref 7–16)
BUN SERPL-MCNC: 67 MG/DL (ref 8–22)
CALCIUM SERPL-MCNC: 7.8 MG/DL (ref 8.5–10.5)
CHLORIDE SERPL-SCNC: 108 MMOL/L (ref 96–112)
CO2 SERPL-SCNC: 18 MMOL/L (ref 20–33)
CREAT SERPL-MCNC: 3.45 MG/DL (ref 0.5–1.4)
GFR SERPLBLD CREATININE-BSD FMLA CKD-EPI: 14 ML/MIN/1.73 M 2
GLUCOSE BLD STRIP.AUTO-MCNC: 131 MG/DL (ref 65–99)
GLUCOSE BLD STRIP.AUTO-MCNC: 228 MG/DL (ref 65–99)
GLUCOSE BLD STRIP.AUTO-MCNC: 34 MG/DL (ref 65–99)
GLUCOSE BLD STRIP.AUTO-MCNC: 72 MG/DL (ref 65–99)
GLUCOSE SERPL-MCNC: 72 MG/DL (ref 65–99)
POTASSIUM SERPL-SCNC: 5.2 MMOL/L (ref 3.6–5.5)
SODIUM SERPL-SCNC: 137 MMOL/L (ref 135–145)

## 2025-01-25 PROCEDURE — 700111 HCHG RX REV CODE 636 W/ 250 OVERRIDE (IP): Performed by: HOSPITALIST

## 2025-01-25 PROCEDURE — 82575 CREATININE CLEARANCE TEST: CPT

## 2025-01-25 PROCEDURE — 700102 HCHG RX REV CODE 250 W/ 637 OVERRIDE(OP): Performed by: HOSPITALIST

## 2025-01-25 PROCEDURE — A9270 NON-COVERED ITEM OR SERVICE: HCPCS

## 2025-01-25 PROCEDURE — 99232 SBSQ HOSP IP/OBS MODERATE 35: CPT | Performed by: INTERNAL MEDICINE

## 2025-01-25 PROCEDURE — 99233 SBSQ HOSP IP/OBS HIGH 50: CPT | Performed by: STUDENT IN AN ORGANIZED HEALTH CARE EDUCATION/TRAINING PROGRAM

## 2025-01-25 PROCEDURE — 665998 HH PPS REVENUE CREDIT

## 2025-01-25 PROCEDURE — 36415 COLL VENOUS BLD VENIPUNCTURE: CPT

## 2025-01-25 PROCEDURE — A9270 NON-COVERED ITEM OR SERVICE: HCPCS | Performed by: HOSPITALIST

## 2025-01-25 PROCEDURE — 770006 HCHG ROOM/CARE - MED/SURG/GYN SEMI*

## 2025-01-25 PROCEDURE — 96376 TX/PRO/DX INJ SAME DRUG ADON: CPT

## 2025-01-25 PROCEDURE — 80048 BASIC METABOLIC PNL TOTAL CA: CPT

## 2025-01-25 PROCEDURE — 82962 GLUCOSE BLOOD TEST: CPT

## 2025-01-25 PROCEDURE — 665999 HH PPS REVENUE DEBIT

## 2025-01-25 PROCEDURE — 96372 THER/PROPH/DIAG INJ SC/IM: CPT

## 2025-01-25 PROCEDURE — 700102 HCHG RX REV CODE 250 W/ 637 OVERRIDE(OP)

## 2025-01-25 PROCEDURE — 700111 HCHG RX REV CODE 636 W/ 250 OVERRIDE (IP): Mod: TB

## 2025-01-25 RX ADMIN — HYDROMORPHONE HYDROCHLORIDE 0.5 MG: 1 INJECTION, SOLUTION INTRAMUSCULAR; INTRAVENOUS; SUBCUTANEOUS at 13:09

## 2025-01-25 RX ADMIN — HYDROMORPHONE HYDROCHLORIDE 0.5 MG: 1 INJECTION, SOLUTION INTRAMUSCULAR; INTRAVENOUS; SUBCUTANEOUS at 10:01

## 2025-01-25 RX ADMIN — OXYCODONE HYDROCHLORIDE 10 MG: 10 TABLET ORAL at 12:03

## 2025-01-25 RX ADMIN — HEPARIN SODIUM 5000 UNITS: 5000 INJECTION, SOLUTION INTRAVENOUS; SUBCUTANEOUS at 13:08

## 2025-01-25 RX ADMIN — OXYCODONE HYDROCHLORIDE 10 MG: 10 TABLET ORAL at 05:34

## 2025-01-25 RX ADMIN — HYDROMORPHONE HYDROCHLORIDE 0.5 MG: 1 INJECTION, SOLUTION INTRAMUSCULAR; INTRAVENOUS; SUBCUTANEOUS at 21:00

## 2025-01-25 RX ADMIN — HEPARIN SODIUM 5000 UNITS: 5000 INJECTION, SOLUTION INTRAVENOUS; SUBCUTANEOUS at 05:35

## 2025-01-25 RX ADMIN — VENLAFAXINE HYDROCHLORIDE 150 MG: 75 CAPSULE, EXTENDED RELEASE ORAL at 05:33

## 2025-01-25 RX ADMIN — HYDROMORPHONE HYDROCHLORIDE 0.5 MG: 1 INJECTION, SOLUTION INTRAMUSCULAR; INTRAVENOUS; SUBCUTANEOUS at 23:55

## 2025-01-25 RX ADMIN — OXYCODONE HYDROCHLORIDE 10 MG: 10 TABLET ORAL at 19:47

## 2025-01-25 RX ADMIN — AMLODIPINE BESYLATE 10 MG: 10 TABLET ORAL at 05:34

## 2025-01-25 RX ADMIN — OXYCODONE HYDROCHLORIDE 10 MG: 10 TABLET ORAL at 08:54

## 2025-01-25 RX ADMIN — HYDROMORPHONE HYDROCHLORIDE 0.5 MG: 1 INJECTION, SOLUTION INTRAMUSCULAR; INTRAVENOUS; SUBCUTANEOUS at 17:08

## 2025-01-25 RX ADMIN — TRAZODONE HYDROCHLORIDE 150 MG: 50 TABLET ORAL at 19:50

## 2025-01-25 RX ADMIN — LEVOTHYROXINE SODIUM 250 MCG: 0.12 TABLET ORAL at 05:33

## 2025-01-25 RX ADMIN — SENNOSIDES AND DOCUSATE SODIUM 2 TABLET: 50; 8.6 TABLET ORAL at 17:08

## 2025-01-25 RX ADMIN — INSULIN LISPRO 3 UNITS: 100 INJECTION, SOLUTION INTRAVENOUS; SUBCUTANEOUS at 17:08

## 2025-01-25 RX ADMIN — ATORVASTATIN CALCIUM 40 MG: 40 TABLET, FILM COATED ORAL at 17:08

## 2025-01-25 RX ADMIN — GABAPENTIN 200 MG: 100 CAPSULE ORAL at 05:34

## 2025-01-25 RX ADMIN — DULOXETINE 30 MG: 30 CAPSULE, DELAYED RELEASE ORAL at 05:33

## 2025-01-25 RX ADMIN — HEPARIN SODIUM 5000 UNITS: 5000 INJECTION, SOLUTION INTRAVENOUS; SUBCUTANEOUS at 22:38

## 2025-01-25 RX ADMIN — FUROSEMIDE 80 MG: 40 TABLET ORAL at 05:33

## 2025-01-25 RX ADMIN — OXYCODONE HYDROCHLORIDE 10 MG: 10 TABLET ORAL at 00:41

## 2025-01-25 RX ADMIN — METOPROLOL SUCCINATE 50 MG: 50 TABLET, EXTENDED RELEASE ORAL at 17:08

## 2025-01-25 RX ADMIN — OXYCODONE HYDROCHLORIDE 10 MG: 10 TABLET ORAL at 22:36

## 2025-01-25 RX ADMIN — OXYCODONE HYDROCHLORIDE 10 MG: 10 TABLET ORAL at 15:46

## 2025-01-25 ASSESSMENT — ENCOUNTER SYMPTOMS
PALPITATIONS: 0
CHILLS: 0
WHEEZING: 0
HEMOPTYSIS: 0
WEAKNESS: 1
EYES NEGATIVE: 1
FEVER: 0
SHORTNESS OF BREATH: 0
COUGH: 0
WEIGHT LOSS: 0
NECK PAIN: 1
VOMITING: 0
NAUSEA: 0
SINUS PAIN: 0
BACK PAIN: 1
ORTHOPNEA: 0

## 2025-01-25 ASSESSMENT — PAIN DESCRIPTION - PAIN TYPE
TYPE: ACUTE PAIN

## 2025-01-25 NOTE — PROGRESS NOTES
Assumed care of patient, report received from day shift RN. Assessment complete, patient reporting 7/10 pain in the back area and left arm, medicated per MAR. Patient able to turn self in bed. Continent x2. Diabetic diet. A&O x4. Call light within reach, bed locked in lowest position. Plan of care discussed, patient with no further questions at this time.

## 2025-01-25 NOTE — CARE PLAN
The patient is Stable - Low risk of patient condition declining or worsening    Shift Goals  Clinical Goals: manage pain to comfort level, have a bowel movement  Patient Goals: rest  Family Goals: elissa    Progress made toward(s) clinical / shift goals:    Problem: Pain - Standard  Goal: Alleviation of pain or a reduction in pain to the patient’s comfort goal  Outcome: Progressing  Note: 1. Document pain using the appropriate pain scale per order or unit policy 2. Educate and implement non-pharmacologic comfort measures (i.e. relaxation, distraction, massage, cold/heat therapy, etc.) 3. Pain management medications as ordered 4. Reassess pain after pain med administration per policy 5. If opiods administered assess patient's response to pain medication is appropriate per POSS sedation scale 6. Follow pain management plan developed in collaboration with patient and interdisciplinary team (including palliative care or pain specialists if applicable)      Problem: Bowel Elimination  Goal: Establish and maintain regular bowel function  Outcome: Progressing  Note: 1. Note date of last BM 2. Educate about diet, fluid intake, medication and activity to promote bowel function 3. Educate signs and symptoms of constipation and interventions to implement 4. Pharmacologic bowel management per provider order 5. Regular toileting schedule 6. Upright position for toileting 7. High fiber diet 8. Encourage hydration 9. Collaborate with Clinical Dietician 10. Care and maintenance of ostomy if applicable        Patient is not progressing towards the following goals:

## 2025-01-25 NOTE — PROGRESS NOTES
Hospital Medicine Daily Progress Note    Date of Service  1/25/2025    Chief Complaint  Anu Manuel is a 69 y.o. female admitted 1/22/2025 with neck pain     Hospital Course  Anu Manuel is a 69 y.o. female with past medical history of insulin-dependent diabetes mellitus, ESRD on dialysis, CAD, recent admission for acute on chronic back pain who presented 1/22/2025 with bilateral arm pain.  MRI of the cervical spine from 1/8/2025 revealed abnormal bone marrow signal with raising the possibility of infection and short-term follow-up imaging was recommended.  MRI C-spine is ordered  for further evaluation.  Nephrology recommending no further dialysis as patient's kidney function has improved and has no uremic symptoms.  Pending MRI C-spine for further evaluation      Interval Problem Update    1/25/2025  Seen and examined at bedside  Vitals remained stable  Labs with normal white count, hemoglobin 11.5, chemistry sodium 137 potassium 5.2, bicarbonate 18, BUN/creatinine CT 7/3.45, fingerstick 131  Chart reviewed, consult note reviewed  Plan  Continue on Lasix 80 mg daily  Continue on Cymbalta, gabapentin  Continue on current insulin regimen  MRI C-spine pending, nursing staff to escalate for MRI sacn   Repeat BMP in a.m. to monitor electrolytes and  renal function  Requiring frequent IV narcotics, close monitoring for toxicity while on IV controlled substance.  Monitor oxygen recommend closely  Case discussed with nephrology Dr. Anderson    Patient has a high medical complexity, complex decision making and is at high risk of complication, morbidity and mortality        I have discussed this patient's plan of care and discharge plan at IDT rounds today with Case Management, Nursing, Nursing leadership, and other members of the IDT team.    Consultants/Specialty  nephrology    Code Status  DNAR/DNI    Disposition    Anticipate discharge to: home with close outpatient follow-up    I have placed the  appropriate orders for post-discharge needs.    Review of Systems  Review of Systems   Musculoskeletal:  Positive for joint pain.   Neurological:  Positive for weakness.        Physical Exam  Temp:  [36.2 °C (97.2 °F)-36.6 °C (97.9 °F)] 36.4 °C (97.6 °F)  Pulse:  [62-77] 77  Resp:  [15-17] 17  BP: (105-143)/(49-72) 143/71  SpO2:  [95 %-97 %] 97 %    Physical Exam  Constitutional:       Appearance: Normal appearance.   Cardiovascular:      Rate and Rhythm: Normal rate and regular rhythm.      Pulses: Normal pulses.   Pulmonary:      Effort: Pulmonary effort is normal.   Abdominal:      General: Abdomen is flat. There is distension.   Musculoskeletal:      Right lower leg: No edema.      Left lower leg: No edema.   Skin:     General: Skin is warm.   Neurological:      General: No focal deficit present.      Mental Status: She is alert and oriented to person, place, and time.      Comments: Overall motor strength 5 out of 5 all extremity, does have weak left hand , sensory intact         Fluids    Intake/Output Summary (Last 24 hours) at 1/25/2025 1547  Last data filed at 1/25/2025 0800  Gross per 24 hour   Intake 240 ml   Output 500 ml   Net -260 ml        Laboratory  Recent Labs     01/24/25  0236   WBC 9.4   RBC 3.72*   HEMOGLOBIN 11.5*   HEMATOCRIT 36.7*   MCV 98.7*   MCH 30.9   MCHC 31.3*   RDW 65.4*   PLATELETCT 166   MPV 9.8     Recent Labs     01/23/25  0031 01/24/25  0236 01/25/25  0305   SODIUM 140 138 137   POTASSIUM 5.6* 5.1 5.2   CHLORIDE 111 109 108   CO2 19* 17* 18*   GLUCOSE 82 51* 72   BUN 49* 58* 67*   CREATININE 2.66* 3.12* 3.45*   CALCIUM 8.0* 8.0* 7.8*                   Imaging  US-RUQ   Final Result      1.  Prior cholecystectomy.      2.  Common bile duct diameter measured at 12 mm with some intrahepatic biliary ductal dilatation. This may be related to presence of prior cholecystectomy.      3.  The liver is heterogeneous consistent with fatty change versus hepatocellular dysfunction.       DX-CHEST-PORTABLE (1 VIEW)   Final Result         1.  No acute cardiopulmonary disease.   2.  Atherosclerosis      MR-CERVICAL SPINE-WITH & W/O    (Results Pending)        Assessment/Plan  * Radiculopathy affecting upper extremity- (present on admission)  Assessment & Plan  Her pain has been debilitating despite Neurontin and Cymbalta.  She had the abnormal MRI noted below.  Because of this the MRI will be repeated as she may benefit from spine surgery consultation for either inpatient or outpatient management based on the results.  .  She will be given oral narcotics and no further IV narcotics at this time in order to start the transition of discharge home eventually on oral       1/25/2025  Continue on Cymbalta, gabapentin  Continue on current insulin regimen  MRI C-spine pending, nursing staff to escalate for MRI scan   Requiring frequent IV narcotics, close monitoring for toxicity while on IV controlled substance.  Monitor oxygen recommend closely    CKD (chronic kidney disease) stage 4, GFR 15-29 ml/min ([facility])- (present on admission)  Assessment & Plan  1/25/2025  Continue on Lasix 80 mg daily  Repeat BMP in a.m. to monitor electrolytes and  renal function  Case discussed with nephrology Dr. Anderson    Type 2 diabetes mellitus, with long-term current use of insulin ([facility])- (present on admission)  Assessment & Plan  Continue glargine and add sliding scale with a diabetic diet    DNR (do not resuscitate)- (present on admission)  Assessment & Plan  Per her wishes  She has been followed by palliative care in the past    Referred to hospice    ESRD needing dialysis ([facility])- (present on admission)  Assessment & Plan  She has a left arm fistula  Nephrology will be consulted for dialysis    CAD S/P percutaneous coronary angioplasty- (present on admission)  Assessment & Plan  History of    Primary hypertension- (present on admission)  Assessment & Plan  Continue Norvasc and Toprol with holding parameters         VTE  prophylaxis: heparin SC    I have performed a physical exam and reviewed and updated ROS and Plan today (1/25/2025). In review of yesterday's note (1/24/2025), there are no changes except as documented above.

## 2025-01-25 NOTE — PROGRESS NOTES
Hospital Medicine Daily Progress Note    Date of Service  1/24/2025    Chief Complaint  Anu Manuel is a 69 y.o. female admitted 1/22/2025 with neck pain     Hospital Course  Anu Manuel is a 69 y.o. female with past medical history of insulin-dependent diabetes mellitus, ESRD on dialysis, CAD, recent admission for acute on chronic back pain who presented 1/22/2025 with bilateral arm pain.  MRI of the cervical spine from 1/8/2025 revealed abnormal bone marrow signal with raising the possibility of infection and short-term follow-up imaging was recommended.  MRI C-spine is ordered  for further evaluation.  Nephrology recommending no further dialysis as patient's kidney function has improved and has no uremic symptoms.  Pending MRI C-spine for further evaluation      Interval Problem Update    1/24/2025  Seen and examined bedside, transferred from CDU  Vitals remained stable  Continue to  complain of neck pain  Labs with normal white count, hemoglobin 11.5, chemistry sodium 138 potassium 5.1, bicarbonate 17, hyperglycemia glucose 51, elevated BUN/creatinine, fingerstick around 136  Chest x-ray reviewed, no acute cardiopulmonary disease  Right upper quadrant ultrasound result reviewed, liver noted with fatty changes  Chart reviewed, meds reviewed    Plan  Patient is now inpatient  Continue Lasix 80 mg daily  MRI C-spine pending  Insulin regimen adjusted, Lantus reduced to 15 units for ongoing hypoglycemia  Repeat BMP in a.m. to monitor electrolytes and  renal function  Repeat CBC in a.m. to monitor white count and hemoglobin  Requiring frequent IV narcotics, close monitoring for toxicity while on IV controlled substance  Case discussed with nephrology Dr. Najjar    Patient has a high medical complexity, complex decision making and is at high risk of complication, morbidity and mortality        I have discussed this patient's plan of care and discharge plan at IDT rounds today with Case  Management, Nursing, Nursing leadership, and other members of the IDT team.    Consultants/Specialty  nephrology    Code Status  DNAR/DNI    Disposition  The patient is not medically cleared for discharge to home or a post-acute facility.  Anticipate discharge to: home with close outpatient follow-up    I have placed the appropriate orders for post-discharge needs.    Review of Systems  Review of Systems   Musculoskeletal:  Positive for joint pain.   Neurological:  Positive for weakness.        Physical Exam  Temp:  [36.2 °C (97.2 °F)-36.7 °C (98 °F)] 36.2 °C (97.2 °F)  Pulse:  [63-65] 64  Resp:  [13-18] 13  BP: (119-129)/(67-91) 122/67  SpO2:  [93 %-97 %] 97 %    Physical Exam  Constitutional:       Appearance: Normal appearance.   Cardiovascular:      Rate and Rhythm: Normal rate and regular rhythm.      Pulses: Normal pulses.   Pulmonary:      Effort: Pulmonary effort is normal.   Abdominal:      General: Abdomen is flat. There is distension.   Musculoskeletal:      Right lower leg: No edema.      Left lower leg: No edema.   Skin:     General: Skin is warm.   Neurological:      General: No focal deficit present.      Mental Status: She is alert and oriented to person, place, and time.      Comments: Overall motor strength 5 out of 5 all extremity, does have weak left hand , sensory intact         Fluids    Intake/Output Summary (Last 24 hours) at 1/24/2025 1609  Last data filed at 1/24/2025 1212  Gross per 24 hour   Intake 120 ml   Output 400 ml   Net -280 ml        Laboratory  Recent Labs     01/22/25  0559 01/24/25  0236   WBC 13.8* 9.4   RBC 3.59* 3.72*   HEMOGLOBIN 11.2* 11.5*   HEMATOCRIT 34.9* 36.7*   MCV 97.2 98.7*   MCH 31.2 30.9   MCHC 32.1* 31.3*   RDW 63.0* 65.4*   PLATELETCT 236 166   MPV 10.2 9.8     Recent Labs     01/22/25  0732 01/23/25  0031 01/24/25  0236   SODIUM 144 140 138   POTASSIUM 4.4 5.6* 5.1   CHLORIDE 111 111 109   CO2 22 19* 17*   GLUCOSE 43* 82 51*   BUN 52* 49* 58*   CREATININE  2.55* 2.66* 3.12*   CALCIUM 8.8 8.0* 8.0*                   Imaging  US-RUQ   Final Result      1.  Prior cholecystectomy.      2.  Common bile duct diameter measured at 12 mm with some intrahepatic biliary ductal dilatation. This may be related to presence of prior cholecystectomy.      3.  The liver is heterogeneous consistent with fatty change versus hepatocellular dysfunction.      DX-CHEST-PORTABLE (1 VIEW)   Final Result         1.  No acute cardiopulmonary disease.   2.  Atherosclerosis      MR-CERVICAL SPINE-WITH & W/O    (Results Pending)        Assessment/Plan  * Radiculopathy affecting upper extremity- (present on admission)  Assessment & Plan  Her pain has been debilitating despite Neurontin and Cymbalta.  She had the abnormal MRI noted below.  Because of this the MRI will be repeated as she may benefit from spine surgery consultation for either inpatient or outpatient management based on the results.  .  She will be given oral narcotics and no further IV narcotics at this time in order to start the transition of discharge home eventually on oral     1/24/2025  Pending MRI C-spine  Pain management, requiring IV narcotics  Monitor for toxicity while on IV narcotics  Monitor oxygen requirement  PT OT evaluation  Spinal surgery consult depending on MRI finding    CKD (chronic kidney disease) stage 4, GFR 15-29 ml/min ([facility])- (present on admission)  Assessment & Plan    1/24/2025  Continue on Lasix  Repeat labs in a.m.  Nephrology following closely    Type 2 diabetes mellitus, with long-term current use of insulin ([facility])- (present on admission)  Assessment & Plan  Continue glargine and add sliding scale with a diabetic diet    DNR (do not resuscitate)- (present on admission)  Assessment & Plan  Per her wishes  She has been followed by palliative care in the past    Referred to hospice    ESRD needing dialysis ([facility])- (present on admission)  Assessment & Plan  She has a left arm fistula  Nephrology will be  consulted for dialysis    CAD S/P percutaneous coronary angioplasty- (present on admission)  Assessment & Plan  History of    Primary hypertension- (present on admission)  Assessment & Plan  Continue Norvasc and Toprol with holding parameters         VTE prophylaxis: heparin SC    I have performed a physical exam and reviewed and updated ROS and Plan today (1/24/2025). In review of yesterday's note (1/23/2025), there are no changes except as documented above.

## 2025-01-25 NOTE — DISCHARGE PLANNING
"TCN following. HTH/SCP chart reviewed. No new TCN needs identified. Please see prior TCN note from 1/24 for most recent discharge planning considerations if indicated. Note that per review, [facility] has formally declined at this time (see referral management team notes/hospice liaison notes). Current discharge considerations are now noted to be for dc to home with resumption of [facility]. Note per prior TCN from 1/24 \"TCN reached out to [facility]  to clarify if [facility]  will accept. If not, patient agreeable to [facility]\". Per discussion with [facility] team, pt remains active with [facility], though [facility] will need orders for HH at time of dc for resumption of [facility] services. TCN will continue to follow and collaborate with discharge planning team as additional post acute needs arise. Thank you.      Completed today:  Choice obtained: HH (1. [facility] 2. [facility])  [facility] aware [facility] blanket [facility]  SCP with Non[facility] PCP.   "

## 2025-01-25 NOTE — PROGRESS NOTES
Patient report received and assumed care. Assessed patient at 0815. Patient is Aox4 and reports mild pain in left arm. Patient is on a renal diet. Patient has a right chest HD cath and a left AV fistula that is no longer in use. Patient is x1 hand held assist to the bathroom. Patient bed is in low, locked position with bed alarm on. Standard fall precautions are in place. Personal belonging and call light are within reach. Patient reinforced to use call light when needed.

## 2025-01-25 NOTE — PROGRESS NOTES
Nephrology Daily Progress Note    Date of Service  1/25/2025    Chief Complaint  69 y.o. female admitted 1/22/2025 with ESRD, bilateral arm weakness    Interval Problem Update  Patient is complaining of HA, severe neck pain,   1/24 patient feels better today   unfortunately 24-hour urine was not being collected  We will start 24-hour urine study for creatinine clearance today  1/25 -c/o neck, left arm, back pain  24 H urine collection for creat clearance completed by noon  Depends on results will decide to staop HD or continue  Review of Systems  Review of Systems   Constitutional:  Positive for malaise/fatigue. Negative for chills, fever and weight loss.   HENT:  Negative for congestion, hearing loss and sinus pain.    Eyes: Negative.    Respiratory:  Negative for cough, hemoptysis, shortness of breath and wheezing.    Cardiovascular:  Negative for chest pain, palpitations, orthopnea and leg swelling.   Gastrointestinal:  Negative for nausea and vomiting.   Musculoskeletal:  Positive for back pain, joint pain and neck pain.   Skin: Negative.    All other systems reviewed and are negative.       Physical Exam  Temp:  [36.2 °C (97.2 °F)-36.6 °C (97.9 °F)] 36.2 °C (97.2 °F)  Pulse:  [62-71] 63  Resp:  [15-17] 16  BP: (105-117)/(49-72) 107/58  SpO2:  [95 %-97 %] 95 %    Physical Exam  Vitals reviewed.   Constitutional:       General: She is not in acute distress.     Appearance: Normal appearance. She is well-developed. She is not diaphoretic.   HENT:      Head: Normocephalic and atraumatic.      Nose: Nose normal.      Mouth/Throat:      Mouth: Mucous membranes are moist.      Pharynx: Oropharynx is clear.   Eyes:      Extraocular Movements: Extraocular movements intact.      Conjunctiva/sclera: Conjunctivae normal.      Pupils: Pupils are equal, round, and reactive to light.   Cardiovascular:      Rate and Rhythm: Normal rate and regular rhythm.      Pulses: Normal pulses.      Heart sounds: Normal heart sounds.  "  Pulmonary:      Effort: Pulmonary effort is normal. No respiratory distress.      Breath sounds: Normal breath sounds. No wheezing or rales.   Abdominal:      General: Bowel sounds are normal. There is no distension.      Palpations: Abdomen is soft. There is no mass.      Tenderness: There is no abdominal tenderness. There is no right CVA tenderness or left CVA tenderness.   Musculoskeletal:      Cervical back: Normal range of motion and neck supple.      Right lower leg: No edema.      Left lower leg: No edema.   Skin:     General: Skin is warm.      Coloration: Skin is not pale.      Findings: No erythema or rash.   Neurological:      General: No focal deficit present.      Mental Status: She is alert and oriented to person, place, and time.      Cranial Nerves: No cranial nerve deficit.      Coordination: Coordination normal.   Psychiatric:         Mood and Affect: Mood normal.         Behavior: Behavior normal.         Thought Content: Thought content normal.         Judgment: Judgment normal.         Fluids    Intake/Output Summary (Last 24 hours) at 1/25/2025 1021  Last data filed at 1/25/2025 0800  Gross per 24 hour   Intake 240 ml   Output 900 ml   Net -660 ml       Laboratory  Recent Labs     01/24/25  0236   WBC 9.4   RBC 3.72*   HEMOGLOBIN 11.5*   HEMATOCRIT 36.7*   MCV 98.7*   MCH 30.9   MCHC 31.3*   RDW 65.4*   PLATELETCT 166   MPV 9.8     Recent Labs     01/23/25  0031 01/24/25  0236 01/25/25  0305   SODIUM 140 138 137   POTASSIUM 5.6* 5.1 5.2   CHLORIDE 111 109 108   CO2 19* 17* 18*   GLUCOSE 82 51* 72   BUN 49* 58* 67*   CREATININE 2.66* 3.12* 3.45*   CALCIUM 8.0* 8.0* 7.8*         No results for input(s): \"NTPROBNP\" in the last 72 hours.          Imaging  US-RUQ   Final Result      1.  Prior cholecystectomy.      2.  Common bile duct diameter measured at 12 mm with some intrahepatic biliary ductal dilatation. This may be related to presence of prior cholecystectomy.      3.  The liver is " heterogeneous consistent with fatty change versus hepatocellular dysfunction.      DX-CHEST-PORTABLE (1 VIEW)   Final Result         1.  No acute cardiopulmonary disease.   2.  Atherosclerosis      MR-CERVICAL SPINE-WITH & W/O    (Results Pending)         Assessment/Plan  1 CKD unknown stage: Patient was presumed to be ESRD, however seems her kidney function has improved, patient has no uremic symptoms -creat clearance to complete today  2 Electrolytes: well controlled  3 HTN: BP well controlled  4 volume -not overloaded  5 anemia: Hb stable    Recs:  no acute need for HD  creatinine clearance -f/u results  Renal diet  Daily BMP, CBC.  Check BNP, Phos  Renal dose all meds  Avoid nephrotoxins like NSAIDs.  Wiill follow

## 2025-01-26 LAB
ANION GAP SERPL CALC-SCNC: 14 MMOL/L (ref 7–16)
BUN SERPL-MCNC: 74 MG/DL (ref 8–22)
CALCIUM SERPL-MCNC: 7.6 MG/DL (ref 8.5–10.5)
CHLORIDE SERPL-SCNC: 105 MMOL/L (ref 96–112)
CO2 SERPL-SCNC: 18 MMOL/L (ref 20–33)
COLLECT DURATION TIME UR: 24 HR
CREAT CL/1.73 SQ M ?TM UR+SERPL-ARVRAT: 16 ML/MIN (ref 88–128)
CREAT SERPL-MCNC: 3.42 MG/DL (ref 0.5–1.4)
CREAT SERPL-MCNC: 3.61 MG/DL (ref 0.5–1.4)
CREAT UR-MCNC: 128.94 MG/DL
ERYTHROCYTE [DISTWIDTH] IN BLOOD BY AUTOMATED COUNT: 64.2 FL (ref 35.9–50)
GFR SERPLBLD CREATININE-BSD FMLA CKD-EPI: 13 ML/MIN/1.73 M 2
GLUCOSE BLD STRIP.AUTO-MCNC: 129 MG/DL (ref 65–99)
GLUCOSE BLD STRIP.AUTO-MCNC: 132 MG/DL (ref 65–99)
GLUCOSE BLD STRIP.AUTO-MCNC: 234 MG/DL (ref 65–99)
GLUCOSE BLD STRIP.AUTO-MCNC: 55 MG/DL (ref 65–99)
GLUCOSE BLD STRIP.AUTO-MCNC: 76 MG/DL (ref 65–99)
GLUCOSE BLD STRIP.AUTO-MCNC: 78 MG/DL (ref 65–99)
GLUCOSE BLD STRIP.AUTO-MCNC: 92 MG/DL (ref 65–99)
GLUCOSE SERPL-MCNC: 132 MG/DL (ref 65–99)
HCT VFR BLD AUTO: 32.7 % (ref 37–47)
HGB BLD-MCNC: 10.5 G/DL (ref 12–16)
MCH RBC QN AUTO: 31.6 PG (ref 27–33)
MCHC RBC AUTO-ENTMCNC: 32.1 G/DL (ref 32.2–35.5)
MCV RBC AUTO: 98.5 FL (ref 81.4–97.8)
NT-PROBNP SERPL IA-MCNC: 3946 PG/ML (ref 0–125)
PHOSPHATE SERPL-MCNC: 4.5 MG/DL (ref 2.5–4.5)
PLATELET # BLD AUTO: 151 K/UL (ref 164–446)
PMV BLD AUTO: 10.3 FL (ref 9–12.9)
POTASSIUM SERPL-SCNC: 5.4 MMOL/L (ref 3.6–5.5)
RBC # BLD AUTO: 3.32 M/UL (ref 4.2–5.4)
SODIUM SERPL-SCNC: 137 MMOL/L (ref 135–145)
SPECIMEN VOL UR: 600 ML
URINE CREATININE EXCRETED 1125: 774 MG/24 HR
WBC # BLD AUTO: 6.9 K/UL (ref 4.8–10.8)

## 2025-01-26 PROCEDURE — A9270 NON-COVERED ITEM OR SERVICE: HCPCS | Performed by: HOSPITALIST

## 2025-01-26 PROCEDURE — 665998 HH PPS REVENUE CREDIT

## 2025-01-26 PROCEDURE — 700102 HCHG RX REV CODE 250 W/ 637 OVERRIDE(OP): Performed by: HOSPITALIST

## 2025-01-26 PROCEDURE — 99233 SBSQ HOSP IP/OBS HIGH 50: CPT | Performed by: STUDENT IN AN ORGANIZED HEALTH CARE EDUCATION/TRAINING PROGRAM

## 2025-01-26 PROCEDURE — A9270 NON-COVERED ITEM OR SERVICE: HCPCS | Performed by: STUDENT IN AN ORGANIZED HEALTH CARE EDUCATION/TRAINING PROGRAM

## 2025-01-26 PROCEDURE — 770006 HCHG ROOM/CARE - MED/SURG/GYN SEMI*

## 2025-01-26 PROCEDURE — 99232 SBSQ HOSP IP/OBS MODERATE 35: CPT | Performed by: INTERNAL MEDICINE

## 2025-01-26 PROCEDURE — A9270 NON-COVERED ITEM OR SERVICE: HCPCS | Performed by: INTERNAL MEDICINE

## 2025-01-26 PROCEDURE — 700102 HCHG RX REV CODE 250 W/ 637 OVERRIDE(OP)

## 2025-01-26 PROCEDURE — 96372 THER/PROPH/DIAG INJ SC/IM: CPT

## 2025-01-26 PROCEDURE — 700102 HCHG RX REV CODE 250 W/ 637 OVERRIDE(OP): Performed by: STUDENT IN AN ORGANIZED HEALTH CARE EDUCATION/TRAINING PROGRAM

## 2025-01-26 PROCEDURE — 80048 BASIC METABOLIC PNL TOTAL CA: CPT

## 2025-01-26 PROCEDURE — 84100 ASSAY OF PHOSPHORUS: CPT

## 2025-01-26 PROCEDURE — 82962 GLUCOSE BLOOD TEST: CPT | Mod: 91

## 2025-01-26 PROCEDURE — 85027 COMPLETE CBC AUTOMATED: CPT

## 2025-01-26 PROCEDURE — 700111 HCHG RX REV CODE 636 W/ 250 OVERRIDE (IP): Mod: JZ,TB

## 2025-01-26 PROCEDURE — 97602 WOUND(S) CARE NON-SELECTIVE: CPT

## 2025-01-26 PROCEDURE — 83880 ASSAY OF NATRIURETIC PEPTIDE: CPT

## 2025-01-26 PROCEDURE — 36415 COLL VENOUS BLD VENIPUNCTURE: CPT

## 2025-01-26 PROCEDURE — 700102 HCHG RX REV CODE 250 W/ 637 OVERRIDE(OP): Performed by: INTERNAL MEDICINE

## 2025-01-26 PROCEDURE — A9270 NON-COVERED ITEM OR SERVICE: HCPCS

## 2025-01-26 PROCEDURE — 96376 TX/PRO/DX INJ SAME DRUG ADON: CPT

## 2025-01-26 PROCEDURE — 700111 HCHG RX REV CODE 636 W/ 250 OVERRIDE (IP): Performed by: HOSPITALIST

## 2025-01-26 PROCEDURE — 665999 HH PPS REVENUE DEBIT

## 2025-01-26 PROCEDURE — 700111 HCHG RX REV CODE 636 W/ 250 OVERRIDE (IP): Performed by: STUDENT IN AN ORGANIZED HEALTH CARE EDUCATION/TRAINING PROGRAM

## 2025-01-26 RX ORDER — SODIUM BICARBONATE 650 MG/1
650 TABLET ORAL 2 TIMES DAILY
Status: DISCONTINUED | OUTPATIENT
Start: 2025-01-26 | End: 2025-01-27

## 2025-01-26 RX ORDER — LORAZEPAM 2 MG/ML
0.5 INJECTION INTRAMUSCULAR ONCE
Status: COMPLETED | OUTPATIENT
Start: 2025-01-26 | End: 2025-01-26

## 2025-01-26 RX ORDER — PETROLATUM 420 MG/G
OINTMENT TOPICAL 2 TIMES DAILY
Status: DISCONTINUED | OUTPATIENT
Start: 2025-01-26 | End: 2025-02-05 | Stop reason: HOSPADM

## 2025-01-26 RX ADMIN — INSULIN LISPRO 3 UNITS: 100 INJECTION, SOLUTION INTRAVENOUS; SUBCUTANEOUS at 17:27

## 2025-01-26 RX ADMIN — PETROLATUM: 420 OINTMENT TOPICAL at 18:00

## 2025-01-26 RX ADMIN — GABAPENTIN 200 MG: 100 CAPSULE ORAL at 06:13

## 2025-01-26 RX ADMIN — HYDROMORPHONE HYDROCHLORIDE 0.5 MG: 1 INJECTION, SOLUTION INTRAMUSCULAR; INTRAVENOUS; SUBCUTANEOUS at 10:24

## 2025-01-26 RX ADMIN — METOPROLOL SUCCINATE 50 MG: 50 TABLET, EXTENDED RELEASE ORAL at 17:16

## 2025-01-26 RX ADMIN — OXYCODONE HYDROCHLORIDE 5 MG: 5 TABLET ORAL at 06:18

## 2025-01-26 RX ADMIN — DULOXETINE 30 MG: 30 CAPSULE, DELAYED RELEASE ORAL at 06:13

## 2025-01-26 RX ADMIN — SENNOSIDES AND DOCUSATE SODIUM 2 TABLET: 50; 8.6 TABLET ORAL at 17:16

## 2025-01-26 RX ADMIN — HYDROMORPHONE HYDROCHLORIDE 0.5 MG: 1 INJECTION, SOLUTION INTRAMUSCULAR; INTRAVENOUS; SUBCUTANEOUS at 07:44

## 2025-01-26 RX ADMIN — TRAZODONE HYDROCHLORIDE 150 MG: 50 TABLET ORAL at 20:20

## 2025-01-26 RX ADMIN — SODIUM BICARBONATE 650 MG: 650 TABLET ORAL at 18:00

## 2025-01-26 RX ADMIN — HEPARIN SODIUM 5000 UNITS: 5000 INJECTION, SOLUTION INTRAVENOUS; SUBCUTANEOUS at 17:16

## 2025-01-26 RX ADMIN — HYDROMORPHONE HYDROCHLORIDE 0.5 MG: 1 INJECTION, SOLUTION INTRAMUSCULAR; INTRAVENOUS; SUBCUTANEOUS at 20:20

## 2025-01-26 RX ADMIN — FUROSEMIDE 80 MG: 40 TABLET ORAL at 06:13

## 2025-01-26 RX ADMIN — LORAZEPAM 0.5 MG: 2 INJECTION, SOLUTION INTRAMUSCULAR; INTRAVENOUS at 11:34

## 2025-01-26 RX ADMIN — VENLAFAXINE HYDROCHLORIDE 150 MG: 75 CAPSULE, EXTENDED RELEASE ORAL at 06:13

## 2025-01-26 RX ADMIN — ATORVASTATIN CALCIUM 40 MG: 40 TABLET, FILM COATED ORAL at 17:17

## 2025-01-26 RX ADMIN — LEVOTHYROXINE SODIUM 250 MCG: 0.12 TABLET ORAL at 06:13

## 2025-01-26 RX ADMIN — OXYCODONE HYDROCHLORIDE 10 MG: 10 TABLET ORAL at 09:19

## 2025-01-26 RX ADMIN — HEPARIN SODIUM 5000 UNITS: 5000 INJECTION, SOLUTION INTRAVENOUS; SUBCUTANEOUS at 06:13

## 2025-01-26 RX ADMIN — OXYCODONE HYDROCHLORIDE 10 MG: 10 TABLET ORAL at 16:16

## 2025-01-26 RX ADMIN — HEPARIN SODIUM 5000 UNITS: 5000 INJECTION, SOLUTION INTRAVENOUS; SUBCUTANEOUS at 23:03

## 2025-01-26 RX ADMIN — AMLODIPINE BESYLATE 10 MG: 10 TABLET ORAL at 06:13

## 2025-01-26 RX ADMIN — OXYCODONE HYDROCHLORIDE 10 MG: 10 TABLET ORAL at 19:03

## 2025-01-26 ASSESSMENT — PAIN DESCRIPTION - PAIN TYPE
TYPE: ACUTE PAIN

## 2025-01-26 ASSESSMENT — PAIN SCALES - WONG BAKER
WONGBAKER_NUMERICALRESPONSE: HURTS JUST A LITTLE BIT
WONGBAKER_NUMERICALRESPONSE: HURTS A LITTLE MORE
WONGBAKER_NUMERICALRESPONSE: HURTS A LITTLE MORE

## 2025-01-26 ASSESSMENT — ENCOUNTER SYMPTOMS
WEAKNESS: 1
SHORTNESS OF BREATH: 0
NAUSEA: 0
COUGH: 0
VOMITING: 0
FEVER: 0
MYALGIAS: 1
CHILLS: 0

## 2025-01-26 NOTE — CARE PLAN
The patient is Stable - Low risk of patient condition declining or worsening    Shift Goals  Clinical Goals: pain control, monitor bs and safety  Patient Goals: rest and pain control  Family Goals: RILEY    Progress made toward(s) clinical / shift goals:  Pain control, rest    Patient is not progressing towards the following goals:

## 2025-01-26 NOTE — CARE PLAN
The patient is Stable - Low risk of patient condition declining or worsening    Shift Goals  Clinical Goals: Patient will maintain pain level of 5 or less during course of shift  Patient Goals: rest, pain  Family Goals: RILEY    Progress made toward(s) clinical / shift goals:      Problem: Knowledge Deficit - Standard  Goal: Patient and family/care givers will demonstrate understanding of plan of care, disease process/condition, diagnostic tests and medications  Outcome: Progressing    Patient educated on plan of care, medications, and procedures. Patient is Aox4. Patient verbalizes understanding of care. Will continue to update patient on Plan of care during shift.     Problem: Pain - Standard  Goal: Alleviation of pain or a reduction in pain to the patient’s comfort goal  Outcome: Progressing    Patient pain 7/10 during shift. Pain managed with oxycodone 10 q 3 hrs and dilaudid post 1 hr. Pain managed with heat.     Problem: Fall Risk  Goal: Patient will remain free from falls  Outcome: Progressing    Patient is moderate fall risk. Patient remained free of falls. Patient is x1 assist to the bathroom. Patient up to bathroom once during shift. Patient bed is in low, locked position with bed alarm on. Standard fall precautions are in place. Personal belonging and call light are within reach. Patient reinforced to use call light when needed.       Patient is not progressing towards the following goals:

## 2025-01-26 NOTE — CARE PLAN
The patient is Stable - Low risk of patient condition declining or worsening    Shift Goals  Clinical Goals: pain control, monitor bs and safety  Patient Goals: rest and pain control  Family Goals: RILEY    Progress made toward(s) clinical / shift goals:  Due pain medication as ordered slept for few hours at night and rested.     Patient is not progressing towards the following goals:

## 2025-01-26 NOTE — WOUND TEAM
[facility]  Inpatient Services  Wound and Skin Care Brief Evaluation    Admission Date: 1/22/2025     Last order of IP CONSULT TO WOUND CARE was found on 1/23/2025 from Hospital Encounter on 1/22/2025     HPI, PMH, SH: Reviewed    Chief Complaint   Patient presents with    Arm Pain     BIBA from home for c/o BUE pain for 4 weeks, EMS gave 100mcg fentanyl IVP.  Missed HD and pain management d/t pain       Diagnosis: Radiculopathy affecting upper extremity [M54.10]  Intractable back pain [M54.9]    Unit where seen by Wound Team: S524/02     Wound consult placed regarding BL heels. Chart and images reviewed. This discussed with bedside RN. This clinician in to assess patient. Patient pleasant and agreeable. BL heels with dry stable fissures, recommend application of moisturizer to area. Sacrum intact and with no discoloration or wounds, continue offloading measures. Pt requested to not have her sacrum photographed. Pt with no advanced wound care needs.     No pressure injuries or advanced wound care needs identified. Wound consult completed. No further follow up unless indicated and consulted.                    PREVENTATIVE INTERVENTIONS:    Q shift Zeus - performed per nursing policy  Q shift pressure point assessments - performed per nursing policy    Surface/Positioning  Standard/trauma mattress - Currently in Place  Reposition q 2 hours - Currently in Place  TAPs Turning system - Currently in Place    Offloading/Redistribution  Sacral offloading dressing (Silicone dressing) - Currently in Place  Heel offloading dressing (Silicone dressing) - Currently in Place      Containment/Moisture Prevention    Dri-christos pad - Currently in Place

## 2025-01-26 NOTE — PROGRESS NOTES
Nephrology Daily Progress Note    Date of Service  1/26/2025    Chief Complaint  69 y.o. female admitted 1/22/2025 with ESRD, bilateral arm weakness    Interval Problem Update  Patient is complaining of severe headache and neck pain  1/24 patient feels better today   unfortunately 24-hour urine was not being collected  We will start 24-hour urine study for creatinine clearance today  1/26 patient is complaining of neuropathy pain in her arms  Review of Systems  Review of Systems   Constitutional:  Negative for chills, fever and malaise/fatigue.   Respiratory:  Negative for cough and shortness of breath.    Cardiovascular:  Negative for chest pain and leg swelling.   Gastrointestinal:  Negative for nausea and vomiting.   Genitourinary:  Negative for dysuria, frequency and urgency.   Musculoskeletal:  Positive for myalgias.        Physical Exam  Temp:  [36.4 °C (97.6 °F)-36.6 °C (97.8 °F)] 36.6 °C (97.8 °F)  Pulse:  [69-80] 75  Resp:  [16-18] 16  BP: (120-143)/(67-82) 137/73  SpO2:  [95 %-97 %] 95 %    Physical Exam  Vitals and nursing note reviewed.   Constitutional:       General: She is not in acute distress.     Appearance: Normal appearance. She is well-developed. She is not diaphoretic.   HENT:      Head: Normocephalic and atraumatic.      Right Ear: External ear normal.      Left Ear: External ear normal.      Nose: Nose normal.   Eyes:      General: No scleral icterus.        Right eye: No discharge.         Left eye: No discharge.      Conjunctiva/sclera: Conjunctivae normal.   Cardiovascular:      Rate and Rhythm: Normal rate and regular rhythm.      Heart sounds: No murmur heard.  Pulmonary:      Effort: Pulmonary effort is normal. No respiratory distress.      Breath sounds: Normal breath sounds.   Musculoskeletal:         General: No tenderness.      Right lower leg: No edema.      Left lower leg: No edema.   Skin:     General: Skin is warm and dry.      Findings: No erythema.   Neurological:       General: No focal deficit present.      Mental Status: She is alert and oriented to person, place, and time.      Cranial Nerves: No cranial nerve deficit.   Psychiatric:         Mood and Affect: Mood normal.         Behavior: Behavior normal.         Fluids    Intake/Output Summary (Last 24 hours) at 1/26/2025 1356  Last data filed at 1/25/2025 1658  Gross per 24 hour   Intake 480 ml   Output --   Net 480 ml       Laboratory  Recent Labs     01/24/25  0236 01/26/25  0621   WBC 9.4 6.9   RBC 3.72* 3.32*   HEMOGLOBIN 11.5* 10.5*   HEMATOCRIT 36.7* 32.7*   MCV 98.7* 98.5*   MCH 30.9 31.6   MCHC 31.3* 32.1*   RDW 65.4* 64.2*   PLATELETCT 166 151*   MPV 9.8 10.3     Recent Labs     01/24/25  0236 01/25/25  0305 01/26/25  0621   SODIUM 138 137 137   POTASSIUM 5.1 5.2 5.4   CHLORIDE 109 108 105   CO2 17* 18* 18*   GLUCOSE 51* 72 132*   BUN 58* 67* 74*   CREATININE 3.12* 3.45* 3.61*   CALCIUM 8.0* 7.8* 7.6*         Recent Labs     01/26/25  0621   NTPROBNP 3946*           Imaging  US-RUQ   Final Result      1.  Prior cholecystectomy.      2.  Common bile duct diameter measured at 12 mm with some intrahepatic biliary ductal dilatation. This may be related to presence of prior cholecystectomy.      3.  The liver is heterogeneous consistent with fatty change versus hepatocellular dysfunction.      DX-CHEST-PORTABLE (1 VIEW)   Final Result         1.  No acute cardiopulmonary disease.   2.  Atherosclerosis      MR-CERVICAL SPINE-WITH & W/O    (Results Pending)         Assessment/Plan  1 CKD unknown stage: Patient was presumed to be ESRD, however seems her kidney function has improved, patient has no uremic symptoms  2 hyperkalemia: Secondary to CKD and high potassium diet, improved  3 neck pain  4 diabetes mellitus  5 anemia  6 metabolic acidosis  no acute need for HD  Recent urine study showed creatinine clearance 16 mL/min  Start sodium bicarbonate  Renal diet  Daily BMP, CBC.  Renal dose all meds  Avoid nephrotoxins like  NSAIDs.

## 2025-01-26 NOTE — PROGRESS NOTES
"Received alert and oriented x 4. Crying in pain and spasm for her bilateral arms and shoulder.Check vitals sign and recorded accordingly and due med given per MAR. Monitor sign and symptoms of respiratory distress and treatment given accordingly per MAR.Medicated per MAR and reassessed every 2 hours per protocol. Call light within reach. Bed alarm in placed. Needs attended. Continue to monitor./82   Pulse 80   Temp 36.6 °C (97.8 °F) (Temporal)   Resp 18   Ht 1.676 m (5' 6\")   Wt 57.4 kg (126 lb 8.7 oz)   LMP 04/29/2005 (LMP Unknown)   SpO2 96%   BMI 20.42 kg/m² .  "

## 2025-01-27 LAB
ANION GAP SERPL CALC-SCNC: 13 MMOL/L (ref 7–16)
BUN SERPL-MCNC: 75 MG/DL (ref 8–22)
CALCIUM SERPL-MCNC: 7.9 MG/DL (ref 8.5–10.5)
CHLORIDE SERPL-SCNC: 107 MMOL/L (ref 96–112)
CO2 SERPL-SCNC: 16 MMOL/L (ref 20–33)
CREAT SERPL-MCNC: 3.88 MG/DL (ref 0.5–1.4)
GFR SERPLBLD CREATININE-BSD FMLA CKD-EPI: 12 ML/MIN/1.73 M 2
GLUCOSE BLD STRIP.AUTO-MCNC: 134 MG/DL (ref 65–99)
GLUCOSE BLD STRIP.AUTO-MCNC: 203 MG/DL (ref 65–99)
GLUCOSE BLD STRIP.AUTO-MCNC: 209 MG/DL (ref 65–99)
GLUCOSE BLD STRIP.AUTO-MCNC: 221 MG/DL (ref 65–99)
GLUCOSE SERPL-MCNC: 135 MG/DL (ref 65–99)
POTASSIUM SERPL-SCNC: 5.4 MMOL/L (ref 3.6–5.5)
SODIUM SERPL-SCNC: 136 MMOL/L (ref 135–145)

## 2025-01-27 PROCEDURE — 82962 GLUCOSE BLOOD TEST: CPT

## 2025-01-27 PROCEDURE — 700102 HCHG RX REV CODE 250 W/ 637 OVERRIDE(OP): Performed by: INTERNAL MEDICINE

## 2025-01-27 PROCEDURE — 96376 TX/PRO/DX INJ SAME DRUG ADON: CPT

## 2025-01-27 PROCEDURE — 665998 HH PPS REVENUE CREDIT

## 2025-01-27 PROCEDURE — 36415 COLL VENOUS BLD VENIPUNCTURE: CPT

## 2025-01-27 PROCEDURE — 665999 HH PPS REVENUE DEBIT

## 2025-01-27 PROCEDURE — 770006 HCHG ROOM/CARE - MED/SURG/GYN SEMI*

## 2025-01-27 PROCEDURE — 700111 HCHG RX REV CODE 636 W/ 250 OVERRIDE (IP): Performed by: STUDENT IN AN ORGANIZED HEALTH CARE EDUCATION/TRAINING PROGRAM

## 2025-01-27 PROCEDURE — A9270 NON-COVERED ITEM OR SERVICE: HCPCS | Performed by: INTERNAL MEDICINE

## 2025-01-27 PROCEDURE — 700102 HCHG RX REV CODE 250 W/ 637 OVERRIDE(OP): Performed by: HOSPITALIST

## 2025-01-27 PROCEDURE — 700117 HCHG RX CONTRAST REV CODE 255: Mod: JZ | Performed by: STUDENT IN AN ORGANIZED HEALTH CARE EDUCATION/TRAINING PROGRAM

## 2025-01-27 PROCEDURE — A9579 GAD-BASE MR CONTRAST NOS,1ML: HCPCS | Mod: JZ | Performed by: STUDENT IN AN ORGANIZED HEALTH CARE EDUCATION/TRAINING PROGRAM

## 2025-01-27 PROCEDURE — 87015 SPECIMEN INFECT AGNT CONCNTJ: CPT

## 2025-01-27 PROCEDURE — A9270 NON-COVERED ITEM OR SERVICE: HCPCS | Performed by: HOSPITALIST

## 2025-01-27 PROCEDURE — 700111 HCHG RX REV CODE 636 W/ 250 OVERRIDE (IP): Performed by: HOSPITALIST

## 2025-01-27 PROCEDURE — 87186 SC STD MICRODIL/AGAR DIL: CPT | Mod: 91

## 2025-01-27 PROCEDURE — 99232 SBSQ HOSP IP/OBS MODERATE 35: CPT | Performed by: INTERNAL MEDICINE

## 2025-01-27 PROCEDURE — 87077 CULTURE AEROBIC IDENTIFY: CPT

## 2025-01-27 PROCEDURE — 80048 BASIC METABOLIC PNL TOTAL CA: CPT

## 2025-01-27 PROCEDURE — 99233 SBSQ HOSP IP/OBS HIGH 50: CPT | Performed by: STUDENT IN AN ORGANIZED HEALTH CARE EDUCATION/TRAINING PROGRAM

## 2025-01-27 PROCEDURE — 87040 BLOOD CULTURE FOR BACTERIA: CPT

## 2025-01-27 PROCEDURE — 96372 THER/PROPH/DIAG INJ SC/IM: CPT

## 2025-01-27 PROCEDURE — 700102 HCHG RX REV CODE 250 W/ 637 OVERRIDE(OP)

## 2025-01-27 PROCEDURE — 700111 HCHG RX REV CODE 636 W/ 250 OVERRIDE (IP): Mod: JZ,TB

## 2025-01-27 PROCEDURE — A9270 NON-COVERED ITEM OR SERVICE: HCPCS

## 2025-01-27 RX ORDER — LORAZEPAM 2 MG/ML
0.5 INJECTION INTRAMUSCULAR ONCE
Status: COMPLETED | OUTPATIENT
Start: 2025-01-27 | End: 2025-01-27

## 2025-01-27 RX ORDER — LIOTHYRONINE SODIUM 5 UG/1
5 TABLET ORAL DAILY
Qty: 90 TABLET | Refills: 3 | Status: SHIPPED | OUTPATIENT
Start: 2025-01-27

## 2025-01-27 RX ORDER — SODIUM CHLORIDE 9 MG/ML
100 INJECTION, SOLUTION INTRAVENOUS
Status: DISCONTINUED | OUTPATIENT
Start: 2025-01-27 | End: 2025-02-05 | Stop reason: HOSPADM

## 2025-01-27 RX ORDER — TORSEMIDE 100 MG/1
100 TABLET ORAL
Status: DISCONTINUED | OUTPATIENT
Start: 2025-01-27 | End: 2025-02-05 | Stop reason: HOSPADM

## 2025-01-27 RX ORDER — METHOCARBAMOL 750 MG/1
750 TABLET, FILM COATED ORAL 3 TIMES DAILY
Status: DISCONTINUED | OUTPATIENT
Start: 2025-01-28 | End: 2025-02-05 | Stop reason: HOSPADM

## 2025-01-27 RX ORDER — SODIUM BICARBONATE 650 MG/1
1950 TABLET ORAL 2 TIMES DAILY
Status: DISCONTINUED | OUTPATIENT
Start: 2025-01-27 | End: 2025-01-27

## 2025-01-27 RX ADMIN — DULOXETINE 30 MG: 30 CAPSULE, DELAYED RELEASE ORAL at 04:46

## 2025-01-27 RX ADMIN — INSULIN LISPRO 3 UNITS: 100 INJECTION, SOLUTION INTRAVENOUS; SUBCUTANEOUS at 17:48

## 2025-01-27 RX ADMIN — SENNOSIDES AND DOCUSATE SODIUM 2 TABLET: 50; 8.6 TABLET ORAL at 17:55

## 2025-01-27 RX ADMIN — HEPARIN SODIUM 5000 UNITS: 5000 INJECTION, SOLUTION INTRAVENOUS; SUBCUTANEOUS at 04:45

## 2025-01-27 RX ADMIN — OXYCODONE HYDROCHLORIDE 10 MG: 10 TABLET ORAL at 11:26

## 2025-01-27 RX ADMIN — HEPARIN SODIUM 5000 UNITS: 5000 INJECTION, SOLUTION INTRAVENOUS; SUBCUTANEOUS at 15:32

## 2025-01-27 RX ADMIN — OXYCODONE HYDROCHLORIDE 10 MG: 10 TABLET ORAL at 07:54

## 2025-01-27 RX ADMIN — VENLAFAXINE HYDROCHLORIDE 150 MG: 75 CAPSULE, EXTENDED RELEASE ORAL at 04:46

## 2025-01-27 RX ADMIN — OXYCODONE HYDROCHLORIDE 10 MG: 10 TABLET ORAL at 15:33

## 2025-01-27 RX ADMIN — METHOCARBAMOL TABLETS 750 MG: 750 TABLET, COATED ORAL at 15:44

## 2025-01-27 RX ADMIN — HYDROMORPHONE HYDROCHLORIDE 0.5 MG: 1 INJECTION, SOLUTION INTRAMUSCULAR; INTRAVENOUS; SUBCUTANEOUS at 09:27

## 2025-01-27 RX ADMIN — OXYCODONE HYDROCHLORIDE 10 MG: 10 TABLET ORAL at 22:58

## 2025-01-27 RX ADMIN — SODIUM BICARBONATE 1950 MG: 650 TABLET ORAL at 17:55

## 2025-01-27 RX ADMIN — METOPROLOL SUCCINATE 50 MG: 50 TABLET, EXTENDED RELEASE ORAL at 17:55

## 2025-01-27 RX ADMIN — PETROLATUM: 420 OINTMENT TOPICAL at 04:48

## 2025-01-27 RX ADMIN — SODIUM BICARBONATE 650 MG: 650 TABLET ORAL at 04:45

## 2025-01-27 RX ADMIN — OXYCODONE HYDROCHLORIDE 10 MG: 10 TABLET ORAL at 19:51

## 2025-01-27 RX ADMIN — INSULIN LISPRO 3 UNITS: 100 INJECTION, SOLUTION INTRAVENOUS; SUBCUTANEOUS at 12:14

## 2025-01-27 RX ADMIN — INSULIN LISPRO 3 UNITS: 100 INJECTION, SOLUTION INTRAVENOUS; SUBCUTANEOUS at 07:59

## 2025-01-27 RX ADMIN — AMLODIPINE BESYLATE 10 MG: 10 TABLET ORAL at 04:45

## 2025-01-27 RX ADMIN — FUROSEMIDE 80 MG: 40 TABLET ORAL at 04:46

## 2025-01-27 RX ADMIN — GABAPENTIN 200 MG: 100 CAPSULE ORAL at 04:45

## 2025-01-27 RX ADMIN — ATORVASTATIN CALCIUM 40 MG: 40 TABLET, FILM COATED ORAL at 17:55

## 2025-01-27 RX ADMIN — HYDROMORPHONE HYDROCHLORIDE 0.5 MG: 1 INJECTION, SOLUTION INTRAMUSCULAR; INTRAVENOUS; SUBCUTANEOUS at 20:54

## 2025-01-27 RX ADMIN — OXYCODONE HYDROCHLORIDE 10 MG: 10 TABLET ORAL at 04:45

## 2025-01-27 RX ADMIN — LEVOTHYROXINE SODIUM 250 MCG: 0.12 TABLET ORAL at 04:45

## 2025-01-27 RX ADMIN — PETROLATUM: 420 OINTMENT TOPICAL at 18:00

## 2025-01-27 RX ADMIN — LORAZEPAM 0.5 MG: 2 INJECTION, SOLUTION INTRAMUSCULAR; INTRAVENOUS at 08:41

## 2025-01-27 RX ADMIN — TORSEMIDE 100 MG: 100 TABLET ORAL at 11:27

## 2025-01-27 RX ADMIN — OXYCODONE HYDROCHLORIDE 10 MG: 10 TABLET ORAL at 00:39

## 2025-01-27 RX ADMIN — GADOTERIDOL 12 ML: 279.3 INJECTION, SOLUTION INTRAVENOUS at 16:32

## 2025-01-27 RX ADMIN — TRAZODONE HYDROCHLORIDE 150 MG: 50 TABLET ORAL at 20:54

## 2025-01-27 ASSESSMENT — PAIN DESCRIPTION - PAIN TYPE
TYPE: ACUTE PAIN

## 2025-01-27 ASSESSMENT — ENCOUNTER SYMPTOMS
FEVER: 0
SHORTNESS OF BREATH: 0
ABDOMINAL PAIN: 0
WEAKNESS: 1

## 2025-01-27 ASSESSMENT — PAIN SCALES - WONG BAKER
WONGBAKER_NUMERICALRESPONSE: HURTS A LITTLE MORE
WONGBAKER_NUMERICALRESPONSE: HURTS JUST A LITTLE BIT

## 2025-01-27 NOTE — DISCHARGE PLANNING
"TCN following. HTH/SCP chart reviewed. Collaborated with NEELAM Collier. No new TCN needs identified.  Please see prior TCN note from 1/24 and 1/25 for most recent discharge planning considerations if indicated.Current discharge considerations are noted to be for dc to home with resumption of [facility]. Note per prior TCN from 1/24 \"TCN reached out to [facility]  to clarify if [facility] will accept. If not, patient agreeable to [facility]\". Per discussion with [facility] team, pt remains active with [facility], though [facility] will need orders for HH at time of dc for resumption of [facility] services. Per review, noted patient is followed by Nephrology at this time  with plans for repeat MRI (and possible surgery consult) noting therapy following for evaluations as appropriate. As patient discharge plan is likely developing, TCN will continue to follow and collaborate with discharge planning team as additional post acute needs arise. Thank you.      Completed today:  Choice obtained:  (1. [facility] 2. [facility])  Pt aware of [facility] blanket referral policy  SCP with [facility] PCP.   "

## 2025-01-27 NOTE — CARE PLAN
The patient is Stable - Low risk of patient condition declining or worsening    Shift Goals  Clinical Goals: safety, bs monitor for hypoglycemia  and pain control  Patient Goals: rest and comfort  Family Goals: RILEY    Progress made toward(s) clinical / shift goals:  Promote independence,     Patient is not progressing towards the following goals:

## 2025-01-27 NOTE — PROGRESS NOTES
Hospital Medicine Daily Progress Note    Date of Service  1/26/2025    Chief Complaint  Anu Manuel is a 69 y.o. female admitted 1/22/2025 with neck pain     Hospital Course  Anu Manuel is a 69 y.o. female with past medical history of insulin-dependent diabetes mellitus, ESRD on dialysis, CAD, recent admission for acute on chronic back pain who presented 1/22/2025 with bilateral arm pain.  MRI of the cervical spine from 1/8/2025 revealed abnormal bone marrow signal with raising the possibility of infection and short-term follow-up imaging was recommended.  MRI C-spine is ordered  for further evaluation.  Nephrology recommending no further dialysis as patient's kidney function has improved and has no uremic symptoms.  Pending MRI C-spine for further evaluation      Interval Problem Update  1/26/2025  Seen and examined at bedside  Vital stable  Continue complain pain, appeared anxious  Labs reviewed normal white count hemoglobin 10.5, sodium 189 potassium 5.5, bicarbonate 18, BUN/creatinine 74/3.61   chart reviewed, meds reviewed, consult note reviewed,      Plan   started on sodium tablets for ongoing low bicarbonate  I have given one-time dose of IV lorazepam for anxiety  I have hold her glargine for episode of hypoglycemia and persistent low blood sugars  Continue on Lasix 80 mg daily  Continue on Cymbalta, gabapentin  Continue on current insulin regimen  MRI C-spine pending,   Repeat BMP in a.m. to monitor electrolytes and  renal function  Requiring frequent IV narcotics, close monitoring for toxicity while on IV controlled substance.  Monitor oxygen recommend closely  Case discussed with nephrology Dr. Najjar.  No plan for dialysis.    Patient has a high medical complexity, complex decision making and is at high risk of complication, morbidity and mortality        I have discussed this patient's plan of care and discharge plan at IDT rounds today with Case Management, Nursing, Nursing  leadership, and other members of the IDT team.    Consultants/Specialty  nephrology    Code Status  DNAR/DNI    Disposition  The patient is not medically cleared for discharge to home or a post-acute facility.  Anticipate discharge to: home with close outpatient follow-up    I have placed the appropriate orders for post-discharge needs.    Review of Systems  Review of Systems   Musculoskeletal:  Positive for joint pain.   Neurological:  Positive for weakness.        Physical Exam  Temp:  [36.5 °C (97.7 °F)-36.6 °C (97.8 °F)] 36.6 °C (97.8 °F)  Pulse:  [69-80] 75  Resp:  [16-18] 16  BP: (120-137)/(67-82) 137/73  SpO2:  [95 %-96 %] 95 %    Physical Exam  Constitutional:       Appearance: Normal appearance.   Cardiovascular:      Rate and Rhythm: Normal rate and regular rhythm.      Pulses: Normal pulses.   Pulmonary:      Effort: Pulmonary effort is normal.   Abdominal:      General: Abdomen is flat. There is distension.   Musculoskeletal:      Right lower leg: No edema.      Left lower leg: No edema.   Skin:     General: Skin is warm.   Neurological:      General: No focal deficit present.      Mental Status: She is alert and oriented to person, place, and time.      Comments: Overall motor strength 5 out of 5 all extremity, does have weak left hand , sensory intact         Fluids    Intake/Output Summary (Last 24 hours) at 1/26/2025 1614  Last data filed at 1/25/2025 1658  Gross per 24 hour   Intake 480 ml   Output --   Net 480 ml        Laboratory  Recent Labs     01/24/25  0236 01/26/25  0621   WBC 9.4 6.9   RBC 3.72* 3.32*   HEMOGLOBIN 11.5* 10.5*   HEMATOCRIT 36.7* 32.7*   MCV 98.7* 98.5*   MCH 30.9 31.6   MCHC 31.3* 32.1*   RDW 65.4* 64.2*   PLATELETCT 166 151*   MPV 9.8 10.3     Recent Labs     01/24/25  0236 01/25/25  0305 01/26/25  0621   SODIUM 138 137 137   POTASSIUM 5.1 5.2 5.4   CHLORIDE 109 108 105   CO2 17* 18* 18*   GLUCOSE 51* 72 132*   BUN 58* 67* 74*   CREATININE 3.12* 3.45* 3.61*   CALCIUM  8.0* 7.8* 7.6*                   Imaging  US-RUQ   Final Result      1.  Prior cholecystectomy.      2.  Common bile duct diameter measured at 12 mm with some intrahepatic biliary ductal dilatation. This may be related to presence of prior cholecystectomy.      3.  The liver is heterogeneous consistent with fatty change versus hepatocellular dysfunction.      DX-CHEST-PORTABLE (1 VIEW)   Final Result         1.  No acute cardiopulmonary disease.   2.  Atherosclerosis      MR-CERVICAL SPINE-WITH & W/O    (Results Pending)        Assessment/Plan  * Radiculopathy affecting upper extremity- (present on admission)  Assessment & Plan  Her pain has been debilitating despite Neurontin and Cymbalta.  She had the abnormal MRI noted below.  Because of this the MRI will be repeated as she may benefit from spine surgery consultation for either inpatient or outpatient management based on the results.  .  She will be given oral narcotics and no further IV narcotics at this time in order to start the transition of discharge home eventually on oral   Continue on Cymbalta, gabapentin  MRI C-spine pending  Requiring frequent IV narcotics, close monitoring for toxicity while on IV controlled substance.  Monitor oxygen recommend closely    CKD (chronic kidney disease) stage 4, GFR 15-29 ml/min ([facility])- (present on admission)  Assessment & Plan  1/26/2025   started on sodium tablets for ongoing low bicarbonate  Continue on Lasix 80 mg daily  Repeat BMP in a.m. to monitor electrolytes and  renal function  Case discussed with nephrology Dr. Najjar.  No plan for dialysis.    Type 2 diabetes mellitus, with long-term current use of insulin ([facility])- (present on admission)  Assessment & Plan  I have hold her glargine for episode of hypoglycemia and persistent low blood sugars  Continue with sliding scale, monitor fingerstick closely      DNR (do not resuscitate)- (present on admission)  Assessment & Plan  Per her wishes  She has been followed by  palliative care in the past    Referred to hospice    ESRD needing dialysis ([facility])- (present on admission)  Assessment & Plan  She has a left arm fistula  Nephrology will be consulted for dialysis    CAD S/P percutaneous coronary angioplasty- (present on admission)  Assessment & Plan  History of    Primary hypertension- (present on admission)  Assessment & Plan  Continue Norvasc and Toprol with holding parameters         VTE prophylaxis: heparin SC    I have performed a physical exam and reviewed and updated ROS and Plan today (1/26/2025). In review of yesterday's note (1/25/2025), there are no changes except as documented above.

## 2025-01-27 NOTE — PROGRESS NOTES
"Per pt CHG wipes used only around HD catheter. Pt stated \"someone already did the CHG wipes today\". Since it was not charted this CNA educated pt that we would have to do them again and pt stated \"then we're not doing the whole body, I only want it around my catheter\". Charge notified   "

## 2025-01-27 NOTE — PROGRESS NOTES
"Received alert and oriented x 4. Crying and moaning in pain. Due med given as ordered and reassessed after. Check vitals sign and recorded accordingly and due med given per MAR. Monitor sign and symptoms of respiratory distress and treatment given accordingly per MAR.Medicated per MAR and reassessed every 2 hours per protocol. Call light within reach. Bed alarm in placed. Needs attended. Continue to monitor./62   Pulse 71   Temp 36.5 °C (97.7 °F) (Temporal)   Resp 17   Ht 1.676 m (5' 6\")   Wt 57.4 kg (126 lb 8.7 oz)   LMP 04/29/2005 (LMP Unknown)   SpO2 95%   BMI 20.42 kg/m² .  "

## 2025-01-27 NOTE — PROGRESS NOTES
Hospital Medicine Daily Progress Note    Date of Service  1/27/2025    Chief Complaint  Anu Manuel is a 69 y.o. female admitted 1/22/2025 with neck pain     Hospital Course  Anu Manuel is a 69 y.o. female with past medical history of insulin-dependent diabetes mellitus, ESRD on dialysis, CAD, recent admission for acute on chronic back pain who presented 1/22/2025 with bilateral arm pain.  MRI of the cervical spine from 1/8/2025 revealed abnormal bone marrow signal with raising the possibility of infection and short-term follow-up imaging was recommended.  MRI C-spine is ordered  for further evaluation.  Nephrology recommending no further dialysis as patient's kidney function has improved and has no uremic symptoms.  Pending MRI C-spine for further evaluation.      Interval Problem Update    1/27/2025  Seen and examined at bedside  Vital stable  On room air saturating over 90%  Patient continued to complain of left upper extremity pain  Labs reviewed, noted to have normal white count, chemistry sodium 138 potassium 5.4, bicarbonate 16, BUN/creatinine 75/3.88  Chart reviewed, meds reviewed,    Plan  Continue on sodium tablets  Received dose of IV lorazepam for anxiety  Resume Lantus  Continue on torsemide 100 mg daily  Continue on Cymbalta, gabapentin  MRI C-spine pending,   Repeat BMP in a.m. to monitor electrolytes and  renal function  Requiring frequent IV narcotics, close monitoring for toxicity while on IV controlled substance.  Monitor oxygen recommend closely  Nephrology following for dialysis needs.  Case discussed with nephrology Dr. Diggs .   PT/OT eval evaluation        I have discussed this patient's plan of care and discharge plan at IDT rounds today with Case Management, Nursing, Nursing leadership, and other members of the IDT team.    Consultants/Specialty  nephrology    Code Status  DNAR/DNI    Disposition  The patient is not medically cleared for discharge to home or a  post-acute facility.  Anticipate discharge to: home with close outpatient follow-up    I have placed the appropriate orders for post-discharge needs.    Review of Systems  Review of Systems   Musculoskeletal:  Positive for joint pain.   Neurological:  Positive for weakness.        Physical Exam  Temp:  [36.1 °C (96.9 °F)-36.5 °C (97.7 °F)] 36.4 °C (97.6 °F)  Pulse:  [70-79] 78  Resp:  [16-18] 16  BP: (109-143)/(61-89) 143/89  SpO2:  [92 %-99 %] 92 %    Physical Exam  Constitutional:       Appearance: Normal appearance.   Cardiovascular:      Rate and Rhythm: Normal rate and regular rhythm.      Pulses: Normal pulses.   Pulmonary:      Effort: Pulmonary effort is normal.   Abdominal:      General: Abdomen is flat. There is distension.   Musculoskeletal:      Right lower leg: No edema.      Left lower leg: No edema.   Skin:     General: Skin is warm.   Neurological:      General: No focal deficit present.      Mental Status: She is alert and oriented to person, place, and time.      Comments: Overall motor strength 5 out of 5 all extremity, does have weak left hand , sensory intact         Fluids  No intake or output data in the 24 hours ending 01/27/25 1504       Laboratory  Recent Labs     01/26/25  0621   WBC 6.9   RBC 3.32*   HEMOGLOBIN 10.5*   HEMATOCRIT 32.7*   MCV 98.5*   MCH 31.6   MCHC 32.1*   RDW 64.2*   PLATELETCT 151*   MPV 10.3     Recent Labs     01/25/25  0305 01/26/25  0621 01/27/25  0032   SODIUM 137 137 136   POTASSIUM 5.2 5.4 5.4   CHLORIDE 108 105 107   CO2 18* 18* 16*   GLUCOSE 72 132* 135*   BUN 67* 74* 75*   CREATININE 3.45* 3.61* 3.88*   CALCIUM 7.8* 7.6* 7.9*                   Imaging  US-RUQ   Final Result      1.  Prior cholecystectomy.      2.  Common bile duct diameter measured at 12 mm with some intrahepatic biliary ductal dilatation. This may be related to presence of prior cholecystectomy.      3.  The liver is heterogeneous consistent with fatty change versus hepatocellular  dysfunction.      DX-CHEST-PORTABLE (1 VIEW)   Final Result         1.  No acute cardiopulmonary disease.   2.  Atherosclerosis      MR-CERVICAL SPINE-WITH & W/O    (Results Pending)        Assessment/Plan  * Radiculopathy affecting upper extremity- (present on admission)  Assessment & Plan  Her pain has been debilitating despite Neurontin and Cymbalta.  She had the abnormal MRI noted below.  Because of this the MRI will be repeated as she may benefit from spine surgery consultation for either inpatient or outpatient management based on the results.  .  She will be given oral narcotics and no further IV narcotics at this time in order to start the transition of discharge home eventually on oral   Continue on Cymbalta, gabapentin  MRI C-spine pending  Requiring frequent IV narcotics, close monitoring for toxicity while on IV controlled substance.   Pt/ot eval    CKD (chronic kidney disease) stage 4, GFR 15-29 ml/min ([facility])- (present on admission)  Assessment & Plan    1/27/2025  BUN/creatinine 75/3.88  Continue on sodium bicarbonate  Continue on torsemide 100 mg daily  Repeat BMP in a.m. to monitor renal function  Nephrology following for dialysis needs.  Case discussed with nephrology Dr. Diggs .       Type 2 diabetes mellitus, with long-term current use of insulin ([facility])- (present on admission)  Assessment & Plan  On sliding scale and glargine  Monitor of for hypoglycemic episode    DNR (do not resuscitate)- (present on admission)  Assessment & Plan  Per her wishes  She has been followed by palliative care in the past        ESRD needing dialysis ([facility])- (present on admission)  Assessment & Plan  She has a left arm fistula  Nephrology will be consulted for dialysis    CAD S/P percutaneous coronary angioplasty- (present on admission)  Assessment & Plan  History of    Primary hypertension- (present on admission)  Assessment & Plan  Continue Norvasc and Toprol with holding parameters         VTE prophylaxis: heparin  SC    I have performed a physical exam and reviewed and updated ROS and Plan today (1/27/2025). In review of yesterday's note (1/26/2025), there are no changes except as documented above.

## 2025-01-27 NOTE — THERAPY
01/27/25 1303   Interdisciplinary Plan of Care Collaboration   Collaboration Comments OT consult received. Per chart review, pt pending repeat MRI and possible spine surgery consult. Will hold and monitor for POC.

## 2025-01-28 ENCOUNTER — HOME CARE VISIT (OUTPATIENT)
Dept: HOME HEALTH SERVICES | Facility: HOME HEALTHCARE | Age: 70
End: 2025-01-28
Payer: MEDICARE

## 2025-01-28 PROBLEM — R78.81 BACTEREMIA: Status: ACTIVE | Noted: 2025-01-28

## 2025-01-28 PROBLEM — M46.22 ACUTE OSTEOMYELITIS OF CERVICAL SPINE (HCC): Status: ACTIVE | Noted: 2025-01-28

## 2025-01-28 LAB
ANION GAP SERPL CALC-SCNC: 12 MMOL/L (ref 7–16)
BACTERIA BLD CULT: NORMAL
BUN SERPL-MCNC: 84 MG/DL (ref 8–22)
CALCIUM SERPL-MCNC: 8 MG/DL (ref 8.5–10.5)
CHLORIDE SERPL-SCNC: 106 MMOL/L (ref 96–112)
CO2 SERPL-SCNC: 18 MMOL/L (ref 20–33)
CREAT SERPL-MCNC: 3.8 MG/DL (ref 0.5–1.4)
GFR SERPLBLD CREATININE-BSD FMLA CKD-EPI: 12 ML/MIN/1.73 M 2
GLUCOSE BLD STRIP.AUTO-MCNC: 100 MG/DL (ref 65–99)
GLUCOSE BLD STRIP.AUTO-MCNC: 112 MG/DL (ref 65–99)
GLUCOSE BLD STRIP.AUTO-MCNC: 134 MG/DL (ref 65–99)
GLUCOSE BLD STRIP.AUTO-MCNC: 37 MG/DL (ref 65–99)
GLUCOSE BLD STRIP.AUTO-MCNC: 82 MG/DL (ref 65–99)
GLUCOSE SERPL-MCNC: 97 MG/DL (ref 65–99)
POTASSIUM SERPL-SCNC: 5 MMOL/L (ref 3.6–5.5)
SIGNIFICANT IND 70042: NORMAL
SITE SITE: NORMAL
SODIUM SERPL-SCNC: 136 MMOL/L (ref 135–145)
SOURCE SOURCE: NORMAL

## 2025-01-28 PROCEDURE — 700111 HCHG RX REV CODE 636 W/ 250 OVERRIDE (IP)

## 2025-01-28 PROCEDURE — A9270 NON-COVERED ITEM OR SERVICE: HCPCS | Performed by: INTERNAL MEDICINE

## 2025-01-28 PROCEDURE — A9270 NON-COVERED ITEM OR SERVICE: HCPCS | Performed by: HOSPITALIST

## 2025-01-28 PROCEDURE — 665999 HH PPS REVENUE DEBIT

## 2025-01-28 PROCEDURE — 700102 HCHG RX REV CODE 250 W/ 637 OVERRIDE(OP): Performed by: INTERNAL MEDICINE

## 2025-01-28 PROCEDURE — 700105 HCHG RX REV CODE 258: Performed by: INTERNAL MEDICINE

## 2025-01-28 PROCEDURE — 87040 BLOOD CULTURE FOR BACTERIA: CPT

## 2025-01-28 PROCEDURE — 99223 1ST HOSP IP/OBS HIGH 75: CPT | Performed by: INTERNAL MEDICINE

## 2025-01-28 PROCEDURE — 700111 HCHG RX REV CODE 636 W/ 250 OVERRIDE (IP): Performed by: INTERNAL MEDICINE

## 2025-01-28 PROCEDURE — A9270 NON-COVERED ITEM OR SERVICE: HCPCS | Performed by: NEUROLOGICAL SURGERY

## 2025-01-28 PROCEDURE — 90935 HEMODIALYSIS ONE EVALUATION: CPT

## 2025-01-28 PROCEDURE — 99233 SBSQ HOSP IP/OBS HIGH 50: CPT | Performed by: INTERNAL MEDICINE

## 2025-01-28 PROCEDURE — 700111 HCHG RX REV CODE 636 W/ 250 OVERRIDE (IP): Mod: JZ,TB

## 2025-01-28 PROCEDURE — 700102 HCHG RX REV CODE 250 W/ 637 OVERRIDE(OP): Performed by: HOSPITALIST

## 2025-01-28 PROCEDURE — 5A1D70Z PERFORMANCE OF URINARY FILTRATION, INTERMITTENT, LESS THAN 6 HOURS PER DAY: ICD-10-PCS | Performed by: INTERNAL MEDICINE

## 2025-01-28 PROCEDURE — 700102 HCHG RX REV CODE 250 W/ 637 OVERRIDE(OP)

## 2025-01-28 PROCEDURE — 770006 HCHG ROOM/CARE - MED/SURG/GYN SEMI*

## 2025-01-28 PROCEDURE — 80048 BASIC METABOLIC PNL TOTAL CA: CPT

## 2025-01-28 PROCEDURE — 82962 GLUCOSE BLOOD TEST: CPT | Mod: 91

## 2025-01-28 PROCEDURE — 700101 HCHG RX REV CODE 250: Performed by: HOSPITALIST

## 2025-01-28 PROCEDURE — 665998 HH PPS REVENUE CREDIT

## 2025-01-28 PROCEDURE — 36415 COLL VENOUS BLD VENIPUNCTURE: CPT

## 2025-01-28 PROCEDURE — A9270 NON-COVERED ITEM OR SERVICE: HCPCS

## 2025-01-28 PROCEDURE — 700102 HCHG RX REV CODE 250 W/ 637 OVERRIDE(OP): Performed by: NEUROLOGICAL SURGERY

## 2025-01-28 PROCEDURE — 90935 HEMODIALYSIS ONE EVALUATION: CPT | Performed by: INTERNAL MEDICINE

## 2025-01-28 RX ORDER — ACETAMINOPHEN 500 MG
500 TABLET ORAL EVERY 6 HOURS
Status: DISCONTINUED | OUTPATIENT
Start: 2025-01-28 | End: 2025-02-05 | Stop reason: HOSPADM

## 2025-01-28 RX ORDER — HEPARIN SODIUM 1000 [USP'U]/ML
INJECTION, SOLUTION INTRAVENOUS; SUBCUTANEOUS
Status: COMPLETED
Start: 2025-01-28 | End: 2025-01-28

## 2025-01-28 RX ORDER — ACETAMINOPHEN 500 MG
500 TABLET ORAL EVERY 6 HOURS PRN
Status: DISCONTINUED | OUTPATIENT
Start: 2025-01-28 | End: 2025-02-05 | Stop reason: HOSPADM

## 2025-01-28 RX ORDER — HEPARIN SODIUM 1000 [USP'U]/ML
1800 INJECTION, SOLUTION INTRAVENOUS; SUBCUTANEOUS
Status: DISCONTINUED | OUTPATIENT
Start: 2025-01-28 | End: 2025-02-05 | Stop reason: HOSPADM

## 2025-01-28 RX ORDER — GABAPENTIN 300 MG/1
300 CAPSULE ORAL
Status: DISCONTINUED | OUTPATIENT
Start: 2025-01-29 | End: 2025-01-30

## 2025-01-28 RX ADMIN — HEPARIN SODIUM 1800 UNITS: 1000 INJECTION, SOLUTION INTRAVENOUS; SUBCUTANEOUS at 12:40

## 2025-01-28 RX ADMIN — OXYCODONE HYDROCHLORIDE 10 MG: 10 TABLET ORAL at 13:19

## 2025-01-28 RX ADMIN — ACETAMINOPHEN 500 MG: 500 TABLET ORAL at 17:49

## 2025-01-28 RX ADMIN — PETROLATUM: 420 OINTMENT TOPICAL at 21:51

## 2025-01-28 RX ADMIN — HYDROMORPHONE HYDROCHLORIDE 0.5 MG: 1 INJECTION, SOLUTION INTRAMUSCULAR; INTRAVENOUS; SUBCUTANEOUS at 16:28

## 2025-01-28 RX ADMIN — METHOCARBAMOL 750 MG: 750 TABLET ORAL at 13:19

## 2025-01-28 RX ADMIN — LEVOTHYROXINE SODIUM 250 MCG: 0.12 TABLET ORAL at 06:02

## 2025-01-28 RX ADMIN — ATORVASTATIN CALCIUM 40 MG: 40 TABLET, FILM COATED ORAL at 17:49

## 2025-01-28 RX ADMIN — HYDROMORPHONE HYDROCHLORIDE 0.5 MG: 1 INJECTION, SOLUTION INTRAMUSCULAR; INTRAVENOUS; SUBCUTANEOUS at 14:33

## 2025-01-28 RX ADMIN — HEPARIN SODIUM 5000 UNITS: 5000 INJECTION, SOLUTION INTRAVENOUS; SUBCUTANEOUS at 21:50

## 2025-01-28 RX ADMIN — TRAZODONE HYDROCHLORIDE 150 MG: 50 TABLET ORAL at 21:48

## 2025-01-28 RX ADMIN — AMLODIPINE BESYLATE 10 MG: 10 TABLET ORAL at 06:02

## 2025-01-28 RX ADMIN — OXYCODONE HYDROCHLORIDE 10 MG: 10 TABLET ORAL at 20:48

## 2025-01-28 RX ADMIN — GABAPENTIN 200 MG: 100 CAPSULE ORAL at 06:02

## 2025-01-28 RX ADMIN — OXYCODONE HYDROCHLORIDE 5 MG: 5 TABLET ORAL at 15:48

## 2025-01-28 RX ADMIN — METOPROLOL SUCCINATE 50 MG: 50 TABLET, EXTENDED RELEASE ORAL at 17:48

## 2025-01-28 RX ADMIN — OXYCODONE HYDROCHLORIDE 10 MG: 10 TABLET ORAL at 02:48

## 2025-01-28 RX ADMIN — VANCOMYCIN HYDROCHLORIDE 1500 MG: 5 INJECTION, POWDER, LYOPHILIZED, FOR SOLUTION INTRAVENOUS at 15:55

## 2025-01-28 RX ADMIN — HYDROMORPHONE HYDROCHLORIDE 0.5 MG: 1 INJECTION, SOLUTION INTRAMUSCULAR; INTRAVENOUS; SUBCUTANEOUS at 09:18

## 2025-01-28 RX ADMIN — HYDROMORPHONE HYDROCHLORIDE 0.5 MG: 1 INJECTION, SOLUTION INTRAMUSCULAR; INTRAVENOUS; SUBCUTANEOUS at 21:50

## 2025-01-28 RX ADMIN — OXYCODONE HYDROCHLORIDE 10 MG: 10 TABLET ORAL at 06:01

## 2025-01-28 RX ADMIN — OXYCODONE HYDROCHLORIDE 10 MG: 10 TABLET ORAL at 09:08

## 2025-01-28 RX ADMIN — ACETAMINOPHEN 500 MG: 500 TABLET ORAL at 23:45

## 2025-01-28 RX ADMIN — METHOCARBAMOL 750 MG: 750 TABLET ORAL at 17:49

## 2025-01-28 RX ADMIN — DEXTROSE MONOHYDRATE 25 G: 25 INJECTION, SOLUTION INTRAVENOUS at 08:05

## 2025-01-28 RX ADMIN — VENLAFAXINE HYDROCHLORIDE 150 MG: 75 CAPSULE, EXTENDED RELEASE ORAL at 06:02

## 2025-01-28 RX ADMIN — DULOXETINE 30 MG: 30 CAPSULE, DELAYED RELEASE ORAL at 06:02

## 2025-01-28 RX ADMIN — PETROLATUM: 420 OINTMENT TOPICAL at 06:02

## 2025-01-28 RX ADMIN — METHOCARBAMOL 750 MG: 750 TABLET ORAL at 06:01

## 2025-01-28 RX ADMIN — TORSEMIDE 100 MG: 100 TABLET ORAL at 06:01

## 2025-01-28 RX ADMIN — SENNOSIDES AND DOCUSATE SODIUM 2 TABLET: 50; 8.6 TABLET ORAL at 17:48

## 2025-01-28 RX ADMIN — INSULIN LISPRO 6 UNITS: 100 INJECTION, SOLUTION INTRAVENOUS; SUBCUTANEOUS at 23:50

## 2025-01-28 RX ADMIN — OXYCODONE HYDROCHLORIDE 10 MG: 10 TABLET ORAL at 23:45

## 2025-01-28 ASSESSMENT — PAIN SCALES - WONG BAKER
WONGBAKER_NUMERICALRESPONSE: DOESN'T HURT AT ALL
WONGBAKER_NUMERICALRESPONSE: HURTS JUST A LITTLE BIT

## 2025-01-28 ASSESSMENT — PAIN DESCRIPTION - PAIN TYPE
TYPE: CHRONIC PAIN
TYPE: ACUTE PAIN
TYPE: ACUTE PAIN
TYPE: ACUTE PAIN;CHRONIC PAIN
TYPE: ACUTE PAIN
TYPE: ACUTE PAIN;CHRONIC PAIN

## 2025-01-28 ASSESSMENT — ENCOUNTER SYMPTOMS
FOCAL WEAKNESS: 1
CHILLS: 0
SHORTNESS OF BREATH: 0
WEAKNESS: 1
COUGH: 0
NERVOUS/ANXIOUS: 1
ABDOMINAL PAIN: 0
VOMITING: 0
MYALGIAS: 1
NAUSEA: 0
FEVER: 0

## 2025-01-28 NOTE — CONSULTS
Neurosurgery Consult Note    Patient: Anu Manuel    MRN: [id number]    Date of Consultation: 1/27/2025    Reason for Consultation: C7-T1 possible osteomyelitis discitis    Referring Physician: Dr. Fowler    History of Present Illness:  69-year-old female with a history of multiple cervical and lower back surgeries including anterior cervical discectomy and fusion from C4-C7 as well as posterior fusion from C4-C7 performed by Dr. Garcia.  Also has a history of spinal cord stimulator placement that got infected and removed by Dr. Roman.  Has a history of laminectomies with Dr. Keyes in the lumbar or thoracic spine.  Patient has intractable neck and left upper extremity pain.  As questionable C7-T1 discitis/osteomyelitis.  At this time her main complaint is of intractable pain in the base of her neck as well as left upper extremity.  Denies any numbness tingling or weakness in her extremities.  Except for some weakness in the left upper extremity due to pain limiting movement.  No other numbness tingling or weakness in extremities.  No loss of bowel or bladder function.  Denies taking any blood thinners.    Review of Systems:  Constitutional: Denies fevers, chills, night sweats, or weight changes  Eyes: Denies changes in vision, or eye pain  Ears/Nose/Throat/Mouth: Denies nasal congestion or sore throat   Cardiovascular: Denies chest pain or palpitations   Respiratory: Denies shortness of breath, or cough  Gastrointestinal/Hepatic: Denies abdominal pain, nausea, vomiting, diarrhea, or constipation  Genitourinary: Denies dysuria, frequency, or incontinence  Musculoskeletal/Rheum: Denies joint pain or swelling   Skin: Denies rash  Neurological: Denies headache, confusion, memory loss, focal weakness, or parasthesias, positive for pain in the left upper extremity and neck  Psychiatric: Denies mood disorder   Endocrine: Denies hx of diabetes or thyroid dysfunction  Heme/Oncology/Lymph Nodes: Denies enlarged  lymph nodes, bruising, or known bleeding disorder  Allergic/Immunologic: Denies hx of autoimmune disorder      Medications:  Current Facility-Administered Medications   Medication Dose Route Frequency Provider Last Rate Last Admin    torsemide (Demadex) tablet 100 mg  100 mg Oral Q DAY Ankit Diggs M.D.   100 mg at 01/27/25 1127    NS (Bolus) 0.9 % infusion 100 mL  100 mL Intravenous DIALYSIS PRN Ankit Diggs M.D.        [START ON 1/28/2025] methocarbamol (Robaxin) tablet 750 mg  750 mg Oral TID Tomi Rose D.O.        petrolatum 42 % ointment   Topical BID Dalton Fowler M.D.   Given at 01/27/25 1800    insulin GLARGINE (Lantus,Semglee) injection  15 Units Subcutaneous Q EVENING Dalton Fowler M.D.   15 Units at 01/27/25 1748    gabapentin (Neurontin) capsule 200 mg  200 mg Oral Daily-0600 Jigar Cabrales M.D.   200 mg at 01/27/25 0445    metoprolol SR (Toprol XL) tablet 50 mg  50 mg Oral Q EVENING Jigar aCbrales M.D.   50 mg at 01/27/25 1755    amLODIPine (Norvasc) tablet 10 mg  10 mg Oral DAILY Cr Sánchez M.D.   10 mg at 01/27/25 0445    atorvastatin (Lipitor) tablet 40 mg  40 mg Oral Q EVENING Cr Sánchez M.D.   40 mg at 01/27/25 1755    DULoxetine (Cymbalta) capsule 30 mg  30 mg Oral DAILY Cr Sánchez M.D.   30 mg at 01/27/25 0446    levothyroxine (Synthroid) tablet 250 mcg  250 mcg Oral AM ES Cr Sánchez M.D.   250 mcg at 01/27/25 0445    traZODone (Desyrel) tablet 150 mg  150 mg Oral QHS Cr Sánchez M.D.   150 mg at 01/27/25 2054    venlafaxine XR (Effexor XR) capsule 150 mg  150 mg Oral DAILY Cr Sánchez M.D.   150 mg at 01/27/25 0446    [Held by provider] heparin injection 5,000 Units  5,000 Units Subcutaneous Q8HRS Cr Sánchez M.D.   5,000 Units at 01/27/25 1532    acetaminophen (Tylenol) tablet 650 mg  650 mg Oral Q6HRS PRN Cr Sánchez M.D.   650 mg at 01/24/25 1147    senna-docusate (Pericolace Or Senokot S) 8.6-50 MG per tablet 2 Tablet  2 Tablet Oral Q EVENING Cr Sánchez,  "M.D.   2 Tablet at 01/27/25 1755    And    polyethylene glycol/lytes (Miralax) Packet 1 Packet  1 Packet Oral QDAY PRN rC Sánchez M.D.   1 Packet at 01/24/25 0512    insulin lispro (HumaLOG,AdmeLOG) subcutaneous injection  2-9 Units Subcutaneous 4X/DAY ACHS Cr Sánchez M.D.   3 Units at 01/27/25 1748    And    dextrose 50% (D50W) injection 25 g  25 g Intravenous Q15 MIN PRN Cr Sánchez M.D.        Pharmacy Consult Request ...Pain Management Review 1 Each  1 Each Other PHARMACY TO DOSE Anny Smith, A.P.R.N.        oxyCODONE immediate-release (Roxicodone) tablet 5 mg  5 mg Oral Q3HRS PRN Anny Smith A.P.R.N.   5 mg at 01/26/25 0618    Or    oxyCODONE immediate release (Roxicodone) tablet 10 mg  10 mg Oral Q3HRS PRN Anny Smith, A.P.R.N.   10 mg at 01/27/25 1951    Or    HYDROmorphone (Dilaudid) injection 0.5 mg  0.5 mg Intravenous Q3HRS PRN Anny Smith, A.P.R.N.   0.5 mg at 01/27/25 2054       Allergies:  Allergies   Allergen Reactions    Tape Unspecified and Rash     Blisters, paper tape is ok  Other reaction(s): Rash       Past Medical History:  Past Medical History:   Diagnosis Date    Accidental drug overdose 08/27/2012    Adverse effect of anesthesia     in 2008 \"throat closes up\"\"anxiety\" ?laryngospasm, kept in ICU. Pt states no problems currently 2018.     Anemia     Anemia due to chronic kidney disease, on chronic dialysis ([facility]) 12/31/2024    Anesthesia     in 2008 \"throat closes up\"\"anxiety\" ?laryngospasm, kept in ICU. Pt states no problems currently 2018.     Arrhythmia     A FIB    Arthritis     right shoulder, hands, OSTEO    Bowel habit changes More frequent    Breath shortness     Broken hip (HCC) 04/2024    Broken hip possibly the pelvis.  Inoperable    Cataract 2022    BILAT IOL    Cigarette smoker quit 2013    Dental disorder     upper denture    depression 08/30/2016    denies depression, states has anxiety and panic attacks    Diabetes mellitus type 1 (HCC) " "1989    insulin    Dialysis patient (HCC) Short time due to a kidney injury from a infection    DIALYSIS, [facility]    Emphysema of lung (HCC)     COPD    Encounter for long-term (current) use of insulin (HCC) 09/25/2013    GI bleed     Heart burn     High cholesterol     Hypertension     Hypothyroidism, postsurgical 1970    Indigestion     Infectious disease      had hepatitis C, tested neg.    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) 06/10/2015    Vertigo        Past Surgical History:  Past Surgical History:   Procedure Laterality Date    AV FISTULA REVISION Left 12/5/2024    Procedure: LIGATION OF LEFT UPPER ARM DIALYSIS GRAFT;  Surgeon: Denis Brown M.D.;  Location: SURGERY [facility];  Service: General    PB REMOVE PERM CANNULA/CATHETER  09/23/2024    Procedure: REMOVAL OF PERITONEAL DIALYSIS CATHETER;  Surgeon: Denis Brown M.D.;  [facility];  Service: General    AV FISTULA CREATION Left 09/23/2024    Procedure: CREATION OF LEFT UPPER EXTREMITY DIALYSIS GRAFT;  Surgeon: Denis Brown M.D.;  Location: SURGERY [facility];  Service: General    PB LAP INSERT INTRAPERITONEAL CATHETER  06/20/2024    Procedure: LAPAROSCOPIC INSERTION OF PERITONEAL DIALYSIS CATHETER;  Surgeon: Denis Brown M.D.;  Location: SURGERY [facility];  Service: General    MI ERCP,DIAGNOSTIC N/A 01/14/2022    Procedure: ERCP, DIAGNOSTIC - WITH SIGMOID COLON BIOPSY AND STENT REMOVAL;  Surgeon: Cr Haro M.D.;  Location: [facility];  Service: Gastroenterology    THADDEUS " BY LAPAROSCOPY N/A 10/28/2021    Procedure: CHOLECYSTECTOMY, LAPAROSCOPIC;  Surgeon: Tello Trammell M.D.;  Location: SURGERY [facility];  Service: General    OK ERCP,DIAGNOSTIC N/A 10/26/2021    Procedure: ERCP (ENDOSCOPIC RETROGRADE CHOLANGIOPANCREATOGRAPHY);  Surgeon: Cr Haro M.D.;  Location: SURGERY SAME DAY [facility];  Service: Gastroenterology    OK UPPER GI ENDOSCOPY,BIOPSY N/A 10/26/2021    Procedure: GASTROSCOPY, WITH BIOPSY;  Surgeon: Cr Haro M.D.;  Location: SURGERY SAME DAY [facility];  Service: Gastroenterology    OK UPPER GI ENDOSCOPY,DIAGNOSIS N/A 10/26/2021    Procedure: GASTROSCOPY;  Surgeon: Cr Haro M.D.;  Location: SURGERY SAME DAY [facility];  Service: Gastroenterology    CATH PLACEMENT  01/25/2020    Procedure: INSERTION, CATHETER PERM;  Surgeon: Rola Mendoza M.D.;  Location: SUR[facility]ERY [facility];  Service: General    IRRIGATION & DEBRIDEMENT NEURO  01/19/2020    Procedure: IRRIGATION AND DEBRIDEMENT, WOUND THORACIC AND LUMBAR;  Surgeon: Ryan Roman M.D.;  Location: [facility];  Service: Neurosurgery    OK INS/RPLC SPI NPG/RCVR POCKET N/A 12/16/2019    Procedure: EXPLORATION AT THORACIC 8 - 9, REPLACEMENT OF  NEUROSTIMULATOR LEAD;  Surgeon: Ryan Roman M.D.;  Location: [facility];  Service: Neurosurgery    SPINAL CORD STIMULATOR N/A 10/26/2018    Procedure: SPINAL CORD STIMULATOR;  Surgeon: Ryan Roman M.D.;  Location: SURGERY [facility];  Service: Neurosurgery    THORACIC LAMINECTOMY N/A 10/26/2018    Procedure: THORACIC LAMINECTOMY - FOR;  Surgeon: Ryan Roman M.D.;  Location: SURGERY [facility];  Service: Neurosurgery    OK NJX AA&/STRD TFRML EPI LUMBAR/SACRAL 1 LEVEL Right 08/31/2016    Procedure: INJ-FORAMEN EPI LUM/SAC SNGL L4-5;  Surgeon: Sukhi Godfrey M.D.;  Location: SURGERY [facility];  Service: Pain Management    LUMBAR LAMINECTOMY DISKECTOMY Right 05/10/2016    Procedure: LUMBAR L4-5 HEMILAMINECTOMY  DISKECTOMY ;  Surgeon: Arnold Keyes M.D.;  Location: SURGERY [facility];  Service:     CERVICAL FUSION POSTERIOR  01/16/2009    Performed by TARA CONTRERAS at SURGERY [facility]    HARDWARE REMOVAL NEURO  01/16/2009    Performed by TARA CONTRERAS at SURGERY [facility]    NECK EXPLORATION  01/16/2009    Performed by TARA CONTRERAS at SURGERY [facility]    SHOULDER ARTHROSCOPY W/ ROTATOR CUFF REPAIR  10/09/2008    Performed by SHERLY CASTANEDA at [facility]    SHOULDER DECOMPRESSION ARTHROSCOPIC  06/17/2008    Performed by SHERLY CASTANEDA at [facility]    CLAVICLE DISTAL EXCISION  06/17/2008    Performed by SHERLY CASTANEDA at [facility]    CERVICAL DISK AND FUSION ANTERIOR  03/12/2008    HYSTERECTOMY, VAGINAL  2006    PARTIAL    APPENDECTOMY  2004    THYROID LOBECTOMY  1973    TONSILLECTOMY  1963    CATARACT PHACO WITH IOL      LUMPECTOMY  1976, 2005    Breast     LUMPECTOMY      OTHER ABDOMINAL SURGERY  2004    OTHER CARDIAC SURGERY  2020 stents inserted       Family History:  Family History   Problem Relation Age of Onset    Hypertension Mother     Cancer Father        Social History:  Social History     Socioeconomic History    Marital status:      Spouse name: Not on file    Number of children: Not on file    Years of education: Not on file    Highest education level: Not on file   Occupational History    Not on file   Tobacco Use    Smoking status: Every Day     Current packs/day: 0.50     Average packs/day: 0.5 packs/day for 30.0 years (15.0 ttl pk-yrs)     Types: Cigarettes    Smokeless tobacco: Never    Tobacco comments:     Currently smokes 1/2 - 3/4 a pack daily.  Has smoked for about 50 years.   Vaping Use    Vaping status: Never Used   Substance and Sexual Activity    Alcohol use: Not Currently    Drug use: Not Currently     Types: Inhaled, Oral, Marijuana     Comment: Marijuana - Occasional; edibles    Sexual activity: Not Currently   Other  Topics Concern    Not on file   Social History Narrative    Not on file     Social Drivers of Health     Financial Resource Strain: Medium Risk (10/16/2024)    Overall Financial Resource Strain (CARDIA)     Difficulty of Paying Living Expenses: Somewhat hard   Food Insecurity: Patient Declined (1/22/2025)    Hunger Vital Sign     Worried About Running Out of Food in the Last Year: Patient declined     Ran Out of Food in the Last Year: Patient declined   Transportation Needs: Patient Declined (1/22/2025)    PRAPARE - Transportation     Lack of Transportation (Medical): Patient declined     Lack of Transportation (Non-Medical): Patient declined   Physical Activity: Inactive (10/16/2024)    Exercise Vital Sign     Days of Exercise per Week: 0 days     Minutes of Exercise per Session: 0 min   Stress: Stress Concern Present (10/16/2024)    [employer] - Occupational Stress Questionnaire     Feeling of Stress : Very much   Social Connections: Feeling Socially Integrated (1/12/2025)    OASIS : Social Isolation     Frequency of experiencing loneliness or isolation: Rarely   Recent Concern: Social Connections - Socially Isolated (10/16/2024)    Social Connection and Isolation Panel [NHANES]     Frequency of Communication with Friends and Family: Three times a week     Frequency of Social Gatherings with Friends and Family: More than three times a week     Attends Yarsanism Services: Never     Active Member of Clubs or Organizations: No     Attends Club or Organization Meetings: Never     Marital Status:    Intimate Partner Violence: Patient Declined (1/22/2025)    Humiliation, Afraid, Rape, and Kick questionnaire     Fear of Current or Ex-Partner: Patient declined     Emotionally Abused: Patient declined     Physically Abused: Patient declined     Sexually Abused: Patient declined   Housing Stability: Patient Declined (1/22/2025)    Housing Stability Vital Sign     Unable to Pay for  Housing in the Last Year: Patient declined     Number of Times Moved in the Last Year: 0     Homeless in the Last Year: Patient declined       Physical Examination:  AOx3. Reflexes and motor strength normal and symmetric. Cranial nerves 2-12 and sensation grossly intact.  Slight weakness in the left upper extremity predominantly in the intrinsic muscles of the hand.  Sensory appears to be active in 4 extremities  PERRLA BL, EOMI    Labs:  Recent Labs     01/26/25  0621   WBC 6.9   RBC 3.32*   HEMOGLOBIN 10.5*   HEMATOCRIT 32.7*   MCV 98.5*   MCH 31.6   MCHC 32.1*   RDW 64.2*   PLATELETCT 151*   MPV 10.3     Recent Labs     01/25/25  0305 01/26/25  0621 01/27/25  0032   SODIUM 137 137 136   POTASSIUM 5.2 5.4 5.4   CHLORIDE 108 105 107   CO2 18* 18* 16*   GLUCOSE 72 132* 135*   BUN 67* 74* 75*   CREATININE 3.45* 3.61* 3.88*   CALCIUM 7.8* 7.6* 7.9*               Imaging:  MRI with and without contrast of the cervical spine: 1.  Postoperative changes in the cervical spine as described above.     2.  Abnormal signal is noted in the prevertebral soft tissues extending from the mid C2 to the C7-T1 level. Abnormal marrow signal is noted at the C7-T1 level with mild enhancement of the intervertebral disc space at this level. These findings are   concerning for discitis-osteomyelitis.     3.  There is moderate spinal canal stenosis at C6-7.    Assessment and Plan:  Rule out osteomyelitis discitis C7-T1  History of anterior and posterior fusion from C4-C7  History of lumbar laminectomy and thoracic laminectomy  History of spinal cord stimulator placement and removal    69-year-old female with intractable pain in the neck and left upper extremity.  MRI is all CT scan of the cervical spine have been reviewed.  Patient does have evidence of erosion of the C7-T1 disc space.  At this time is difficult to ascertain whether this is related to discitis/osteomyelitis versus chronic degenerative changes with inflammatory process.  At  this time the patient does not appear to be septic or have any other source of infection.  Agree with IR biopsy of the disc base to isolate organism if it is indeed discitis.  I would favor conservative management with IV antibiotics if it is discitis  If discitis/osteomyelitis is ruled out I would favor pain control with follow-up as an outpatient for possible operative intervention planned after all conservative options have been exhausted.  As operative intervention would be involved and result in increased pain due to the level of erosion of the disc base as well as vertebral body of C7 and T1.  Will most likely need partial if not complete corpectomies of C7 and T1.  This would be made difficult due to the posterior pedicle screws placed at C7.  This was all discussed with the patient and she agrees with the plan at this time.  Her neurological deficits are mild in nature with left upper extremity mostly limited due to pain.  Otherwise no other focal deficits ascertained from my examination.  Further recommendations are to follow after IR biopsy of that the space.  Case was discussed with patient's hospitalist as well as the patient.  Pain control will be difficult this time due to chronic opioid tolerance.  Patient also with multiple other comorbidities which would make operative intervention increased risk.  Please call with any questions      Tomi Rose D.O.

## 2025-01-28 NOTE — PROGRESS NOTES
Assumed care at 1900. Received report from MINI Rodriguez. Pt is resting in bed. Assessment completed. A&O X 4. VSS. No complaints of pain, chest pain, SOB, or N/V. Lung sounds clear. No cardiac murmurs noted. Normoactive bowel sounds in all quadrants. Pt reports 8/10 pain at this time, medicated per MAR. Pt is on RA. Pt is SBA. Educated on POC and verbalizes understanding. Bed is locked and in the lowest position. Call light and belongings are within reach. Bed alarm on. No further needs at this time.

## 2025-01-28 NOTE — DISCHARGE PLANNING
"HTH/SCP TCN chart review completed. Current discharge considerations are home with home health.  Please see TCN note on 1/25 for additional details; patient would like to resume [facility].  Per kardx patient ambulated 5 feet with HHA and patient is on RA. Per TCN note on 1/2, \"Note that pt reports she has a 4WW and FWW if needed at time of dc.\"    Noted per Neurosurgery note on 1/27, \"I would favor conservative management with IV antibiotics if it is discitis  If discitis/osteomyelitis is ruled out I would favor pain control with follow-up as an outpatient for possible operative intervention planned after all conservative options have been exhausted.\"  TCN will continue to monitor patient's mobilty.        TCN will continue to follow and collaborate with discharge planning team as additional post acute needs arise. Thank you.    Completed:  Choice obtained:  (1. [facility] 2. [facility])  Pt aware of [facility] blanket referral policy  Noted [facility] has declined patient.   SCP with [facility] PCP.     *Update*  Noted per ID note, \"Anticipate 6 weeks IV abx from date of negative blood cxs.\"    "

## 2025-01-28 NOTE — PROGRESS NOTES
Addendum   Mri concerning for C7- t1 discitis/osteomyelitis  Case was discussed with neurosurgery .  Plan is to have IR evaluation for C-spine biopsy. .  IR have been consulted.  Currently no concern for sepsis.  Continue to hold antibiotic.  I will get patient n.p.o. after midnight for possible  biopsy in a.m.

## 2025-01-28 NOTE — CARE PLAN
The patient is Stable - Low risk of patient condition declining or worsening    Shift Goals  Clinical Goals: pt pain will be managed to 0/10, pt will remain free from falls  Patient Goals: sleep  Family Goals: RILEY    Progress made toward(s) clinical / shift goals:  Patient and family educated on plan of care and verbalized understanding, being able to identify barriers for discharge. Patient has remained free of falls this shift with appropriate fall precautions in place throughout. Patient skin integrity maintained throughout shift with skin interventions and checks. Pt rounded on hourly and all needs met.     Problem: Pain - Standard  Goal: Alleviation of pain or a reduction in pain to the patient’s comfort goal  Outcome: Progressing     Problem: Knowledge Deficit - Standard  Goal: Patient and family/care givers will demonstrate understanding of plan of care, disease process/condition, diagnostic tests and medications  Outcome: Progressing     Problem: Fall Risk  Goal: Patient will remain free from falls  Outcome: Progressing       Patient is not progressing towards the following goals:

## 2025-01-28 NOTE — PROGRESS NOTES
"Pt refused CHG bath, this CNA educated pt on risk of infection and pt stated \"I only want it around my HD catheter and right arm\". Charge and nurse notified.   "

## 2025-01-28 NOTE — CONSULTS
"Radiology Consult  Author: MARK King Date & Time created: 1/28/2025  1:08 PM   Date of admission  1/22/2025  Note to reader: this note follows the APSO format rather than the historical SOAP format. Assessment and plan located at the top of the note for ease of use.    Chief Complaint  69 y.o. female admitted 1/22/2025 with   Chief Complaint   Patient presents with    Arm Pain     BIBA from home for c/o BUE pain for 4 weeks, EMS gave 100mcg fentanyl IVP.  Missed HD and pain management d/t pain         HPI  Patient \"is a 69 y.o. female with past medical history of insulin-dependent diabetes mellitus, ESRD on dialysis, CAD, recent admission for acute on chronic back pain who presented 1/22/2025 with bilateral arm pain. MRI of the cervical spine from 1/8/2025 revealed abnormal bone marrow signal with raising the possibility of infection and short-term follow-up imaging was recommended. MRI C-spine is ordered  for further evaluation. Nephrology recommending no further dialysis as patient's kidney function has improved and has no uremic symptoms ... MRI concerning for C7- T1 discitis/osteomyelitis Case was discussed with neurosurgery .\" - Dr. Fowler.  IR was consulted for evaluation for C-spine biopsy.    Labs I reviewed: WBC (1/26) 6.9, H/H (1/26) 10.5/32.7, Cr 3.80. I discussed plan with IR Dr. Trejo. IR does not perform Cervical Spine biopsies. I discussed plan with patient at bedside, hospital nursing staff, Hospitalist, and I reviewed IDT notes.    Assessment/Plan     Principal Problem:    Radiculopathy affecting upper extremity  Active Problems:    Primary hypertension    CAD S/P percutaneous coronary angioplasty    ESRD needing dialysis ([facility])    DNR (do not resuscitate)    Type 2 diabetes mellitus, with long-term current use of insulin ([facility])    Intractable back pain    CKD (chronic kidney disease) stage 4, GFR 15-29 ml/min ([facility])      Plan IR  - No C-spine biopsy to be performed by " IR.  - Recommend medical management.    Thank you for allowing Interventional Radiology team to participate in the patients care, if any additonal care or requests are needed in the future please do not hesitate call or place IR order.        Review of Systems  Physical Exam   Review of Systems   Constitutional:  Negative for chills, fever and malaise/fatigue.   Respiratory:  Negative for cough and shortness of breath.    Cardiovascular:  Negative for chest pain.   Gastrointestinal:  Negative for abdominal pain, nausea and vomiting.   Musculoskeletal:  Positive for joint pain.   Neurological:  Positive for focal weakness (weak  to left hand).      Vitals:    01/28/25 1242   BP: (!) 142/58   Pulse: 68   Resp: 16   Temp: 36.1 °C (96.9 °F)   SpO2: 97%      Physical Exam  Vitals and nursing note reviewed.   Constitutional:       General: She is not in acute distress.  Cardiovascular:      Rate and Rhythm: Normal rate.   Pulmonary:      Effort: Pulmonary effort is normal. No respiratory distress.   Abdominal:      General: There is distension.      Palpations: Abdomen is soft.      Tenderness: There is no abdominal tenderness.   Skin:     General: Skin is warm and dry.   Neurological:      General: No focal deficit present.      Mental Status: She is alert and oriented to person, place, and time.      Comments: motor strength 5 out of 5 all extremities.  Weak left hand , sensory intact.   Psychiatric:         Mood and Affect: Mood normal.         Behavior: Behavior normal.          Labs reviewed by me today    Recent Labs     01/26/25  0621   WBC 6.9   RBC 3.32*   HEMOGLOBIN 10.5*   HEMATOCRIT 32.7*   MCV 98.5*   MCH 31.6   MCHC 32.1*   RDW 64.2*   PLATELETCT 151*   MPV 10.3     Recent Labs     01/26/25  0621 01/27/25  0032 01/28/25  0241   SODIUM 137 136 136   POTASSIUM 5.4 5.4 5.0   CHLORIDE 105 107 106   CO2 18* 16* 18*   GLUCOSE 132* 135* 97   BUN 74* 75* 84*   CREATININE 3.61* 3.88* 3.80*   CALCIUM 7.6* 7.9*  "8.0*     MR-CERVICAL SPINE-WITH & W/O   Final Result         1.  Postoperative changes in the cervical spine as described above.      2.  Abnormal signal is noted in the prevertebral soft tissues extending from the mid C2 to the C7-T1 level. Abnormal marrow signal is noted at the C7-T1 level with mild enhancement of the intervertebral disc space at this level. These findings are    concerning for discitis-osteomyelitis.      3.  There is moderate spinal canal stenosis at C6-7.         US-RUQ   Final Result      1.  Prior cholecystectomy.      2.  Common bile duct diameter measured at 12 mm with some intrahepatic biliary ductal dilatation. This may be related to presence of prior cholecystectomy.      3.  The liver is heterogeneous consistent with fatty change versus hepatocellular dysfunction.      DX-CHEST-PORTABLE (1 VIEW)   Final Result         1.  No acute cardiopulmonary disease.   2.  Atherosclerosis      IR-CONSULT AND TREAT    (Results Pending)     Recent Labs     01/26/25  0621 01/27/25  0032 01/28/25  0241   SODIUM 137 136 136   POTASSIUM 5.4 5.4 5.0   CHLORIDE 105 107 106   CO2 18* 16* 18*   GLUCOSE 132* 135* 97   BUN 74* 75* 84*     INR   Date Value Ref Range Status   12/05/2024 0.93 0.87 - 1.13 Final     Comment:     INR - Non-therapeutic Reference Range: 0.87-1.13  INR - Therapeutic Reference Range: 2.0-4.0       No results found for: \"POCINR\"     Intake/Output Summary (Last 24 hours) at 1/28/2025 0839  Last data filed at 1/27/2025 2000  Gross per 24 hour   Intake 240 ml   Output --   Net 240 ml      Labs not explicitly included in this progress note were reviewed by the author. Radiology/imaging not explicitly included in this progress note was reviewed by the author.     Past Medical History:   Diagnosis Date    Accidental drug overdose 08/27/2012    Adverse effect of anesthesia     in 2008 \"throat closes up\"\"anxiety\" ?laryngospasm, kept in ICU. Pt states no problems currently 2018.     Anemia     " "Anemia due to chronic kidney disease, on chronic dialysis (HCC) 12/31/2024    Anesthesia     in 2008 \"throat closes up\"\"anxiety\" ?laryngospasm, kept in ICU. Pt states no problems currently 2018.     Arrhythmia     A FIB    Arthritis     right shoulder, hands, OSTEO    Bowel habit changes More frequent    Breath shortness     Broken hip (HCC) 04/2024    Broken hip possibly the pelvis.  Inoperable    Cataract 2022    BILAT IOL    Cigarette smoker quit 2013    Dental disorder     upper denture    depression 08/30/2016    denies depression, states has anxiety and panic attacks    Diabetes mellitus type 1 (HCC) 1989    insulin    Dialysis patient ([facility]) Short time due to a kidney injury from a infection    DIALYS[place], [place] [place]    Emphysema of lung ([facility])     COPD    Encounter for long-term (current) use of insulin ([facility]) 09/25/2013    GI bleed     Heart burn     High cholesterol     Hypertension     Hypothyroidism, postsurgical 1970    Indigestion     Infectious disease      had hepatitis C, tested neg.    Jaundice 2021 Liver disease    Joint replacement     cervical    Liver disease     Liver failure ([facility])     Myocardial infarct (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 Last Dose Authorizing Provider   riFAMPin (RIFADINE) 300 MG Cap Take 300 mg by mouth 3 times a day. Yes 1/21/2025 Morning Physician Outpatient   Methoxy PEG-Epoetin Beta (MIRCERA) 30 MCG/0.3ML Solution Prefilled Syringe Inject 13 Units as directed every 4 weeks. Yes 1/13/2025 Physician Outpatient   DULoxetine (CYMBALTA) 30 MG Cap DR Particles Take 1 Capsule by mouth every day. " Yes 1/21/2025 Morning Yanick Neves M.D.   insulin glargine (LANTUS SOLOSTAR) 100 UNIT/ML Solution Pen-injector injection Inject 5 Units under the skin every evening.  Patient taking differently: Inject 30 Units under the skin every evening. Indications: Type 2 Diabetes Yes 1/21/2025 Evening Yanick Neves M.D.   omeprazole (PRILOSEC) 20 MG delayed-release capsule Take 1 Capsule by mouth every day. Yes 1/21/2025 Morning Yanick Neves M.D.   cyanocobalamin (VITAMIN B-12) 500 MCG Tab Take 500 mcg by mouth every day. Indications: Inadequate Vitamin B12 Yes 1/21/2025 Morning Physician Outpatient   ursodiol (ACTIGALL) 300 MG Cap Take 300 mg by mouth 3 times a day. Indications: Liver Disease Yes 1/21/2025 Morning Physician Outpatient   vitamin D3 (CHOLECALCIFEROL) 1000 Unit (25 mcg) Tab Take 1,000 Units by mouth 2 times a day. Indications: Osteoporosis Yes 1/21/2025 Morning Physician Outpatient   insulin lispro 100 UNIT/ML SC SOPN injection PEN Inject 0-9 Units under the skin 3 times a day before meals. 70 - 150 mg/dL = 0 Units 151 - 200 mg/dL = 2 Units 201 - 250 mg/dL = 3 Units 251 - 300 mg/dL = 5 Units 301 - 350 mg/dL = 6 Units 351 - 400 mg/dL = 8 Units Over 400 mg/dL = 9 Units Yes 1/21/2025 Shanti Kimble D.O.   sevelamer (RENAGEL) 800 MG Tab Take 800 mg by mouth 3 times a day with meals. Indications: High Amount of Phosphate in the Blood Yes 1/21/2025 Morning Physician Outpatient   pantoprazole (PROTONIX) 40 MG Tablet Delayed Response Take 1 Tablet by mouth every day. Yes 1/21/2025 Morning Jong Burrell D.O.   famotidine (PEPCID) 20 MG Tab TAKE 1 TABLET BY MOUTH TWICE A DAY Yes 1/21/2025 Morning Fadi Najjar, M.D.   gabapentin (NEURONTIN) 100 MG Cap Take 1 Capsule by mouth every Monday, Wednesday, and Friday. Yes 1/17/2025 Aurelio Toney M.D.   SYNTHROID 125 MCG Tab Take 2 Tablets by mouth every morning on an empty stomach. Yes 1/21/2025 Morning Josue Paz M.D.   amLODIPine (NORVASC) 10 MG Tab  Take 10 mg by mouth every day.     Indications: High Blood Pressure Yes 1/21/2025 Morning Physician Outpatient   senna-docusate (SENOKOT S) 8.6-50 MG Tab Take 1 Tablet by mouth every day.    Yes 1/21/2025 Morning Physician Outpatient   venlafaxine (EFFEXOR-XR) 150 MG extended-release capsule Take 150 mg by mouth every day.    Yes 1/21/2025 Morning Physician Outpatient   liothyronine (CYTOMEL) 5 MCG Tab TAKE 1 TABLET BY MOUTH EVERY DAY   Josue Paz M.D.   cyclobenzaprine (FLEXERIL) 10 mg Tab Take 10 mg by mouth 3 times a day as needed for Muscle Spasms.  Unknown Physician Outpatient   dexamethasone (DECADRON) 4 MG Tab Take 4 mg by mouth 2 times a day. 6 days  Unknown Physician Outpatient   methocarbamol (ROBAXIN) 750 MG Tab Take 1 Tablet by mouth 4 times a day as needed (muscle / bone pain).  Unknown Yanick Neves M.D.   Insulin Pen Needle 32 G x 4 mm Use one pen needle in pen device to inject insulin once daily.  Unknown Yanick Neves M.D.   atorvastatin (LIPITOR) 40 MG Tab TAKE 1 TABLET BY MOUTH EVERY DAY IN THE EVENING  1/20/2025 Evening Jamie Peguero M.D.   lidocaine-prilocaine (EMLA) 2.5-2.5 % Cream Apply 1 g topically as needed (AVF prior to dialysis). APPLY TO AFFECTED AREA AVF ACCESS 30-60 MINS PRIOR TO DIALYSIS TREATMENT WRAP IN SARBRAVO WRAP      Indications: Dialysis  Unknown Physician Outpatient   Continuous Glucose Sensor (FREESTYLE PARISA 3 PLUS SENSOR) Misc 1 Each every 14 days.  Unknown Josue Paz M.D.   metoprolol SR (TOPROL XL) 100 MG TABLET SR 24 HR Take 100 mg by mouth every evening. Indications: Atrial Fibrillation  1/20/2025 Evening Physician Outpatient   traZODone (DESYREL) 150 MG Tab Take 150 mg by mouth at bedtime.     1/20/2025 Bedtime Physician Outpatient       I have performed a physical exam and reviewed and updated ROS and Plan today (1/28/2025).     55 minutes in directly providing and coordinating care and extensive data review.  No time overlap and excludes procedures.

## 2025-01-28 NOTE — DISCHARGE PLANNING
Case Management Discharge Planning    Admission Date: 1/22/2025  GMLOS: 2.8  ALOS: 4    6-Clicks ADL Score: 21  6-Clicks Mobility Score: 19      Anticipated Discharge Dispo: Discharge Disposition: D/T to home under [facility] care in anticipation of covered skilled care (06)    DME Needed: No    Action(s) Taken: OTHER    Discussed patient's case with MD during IDT Rounds. Per MD, patient is not medically clear.    Per MD, Bx Results and ID Recs are pending.     Per Ilene Dialysis Coordinator, patient is establish with [facility].     New outpatient schedule to start upon discharge.  Schedule: Mon, Fri  Time: 2:45 PM    Escalations Completed: None    Medically Clear: No    Next Steps: Home    Barriers to Discharge: Medical clearance    Is the patient up for discharge tomorrow:

## 2025-01-28 NOTE — CONSULTS
"INFECTIOUS DISEASES INPATIENT CONSULT NOTE     Date of Service:1/28/25    Consult Requested By: Annie Esposiot D.O.    Reason for Consultation: vertebral osteo    History of Present Illness:   Anu Manuel is a 69 y.o. diabetic female originally  admitted 1/22/2025 worsening back pain and arm pain  ESRD on dialysis, CAD  MRI of the cervical spine from 1/8/2025 revealed abnormal bone marrow signal with raising the possibility of infection and short-term follow-up imaging was recommended.    MRI C-spine this admission 1/27: Abnormal signal is noted in the prevertebral soft tissues extending from the mid C2 to the C7-T1 level. Abnormal marrow signal is noted at the C7-T1 level with mild enhancement of the intervertebral disc space at this level. These findings are concerning for discitis-osteomyelitis.    IR declined biopsy due to location  Neurosurgery consulted-recommended abx no plan for biopsy/culture  Blood cult +GPC in chains  Infectious Diseases consulted for antibiotic recommendation and management      Review Of Systems:  No fever    PMH:   Past Medical History:   Diagnosis Date    Accidental drug overdose 08/27/2012    Adverse effect of anesthesia     in 2008 \"throat closes up\"\"anxiety\" ?laryngospasm, kept in ICU. Pt states no problems currently 2018.     Anemia     Anemia due to chronic kidney disease, on chronic dialysis ([facility]) 12/31/2024    Anesthesia     in 2008 \"throat closes up\"\"anxiety\" ?laryngospasm, kept in ICU. Pt states no problems currently 2018.     Arrhythmia     A FIB    Arthritis     right shoulder, hands, OSTEO    Bowel habit changes More frequent    Breath shortness     Broken hip ([facility]) 04/2024    Broken hip possibly the pelvis.  Inoperable    Cataract 2022    BILAT IOL    Cigarette smoker quit 2013    Dental disorder     upper denture    depression 08/30/2016    denies depression, states has anxiety and panic attacks    Diabetes mellitus type 1 ([facility]) 1989    insulin    Dialysis " "patient (HCC) Short time due to a kidney injury from a infection    DIALYSIS, [facility], [facility], [facility]    Emphysema of lung (HCC)     COPD    Encounter for long-term (current) use of insulin (HCC) 09/25/2013    GI bleed     Heart burn     High cholesterol     Hypertension     Hypothyroidism, postsurgical 1970    Indigestion     Infectious disease      had hepatitis C, tested neg.    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) 06/10/2015    Vertigo          PSH:  Past Surgical History:   Procedure Laterality Date    AV FISTULA REVISION Left 12/5/2024    Procedure: LIGATION OF LEFT UPPER ARM DIALYSIS GRAFT;  Surgeon: Denis Brown M.D.;  Location: SURGERY [facility];  Service: General    PB REMOVE PERM CANNULA/CATHETER  09/23/2024    Procedure: REMOVAL OF PERITONEAL DIALYSIS CATHETER;  Surgeon: Denis Brown M.D.;  Location: SURGERY [facility];  Service: General    AV FISTULA CREATION Left 09/23/2024    Procedure: CREATION OF LEFT UPPER EXTREMITY DIALYSIS GRAFT;  Surgeon: Denis Brown M.D.;  Location: SURGERY [facility];  Service: General    PB LAP INSERT INTRAPERITONEAL CATHETER  06/20/2024    Procedure: LAPAROSCOPIC INSERTION OF PERITONEAL DIALYSIS CATHETER;  Surgeon: Denis Brown M.D.;  Location: SURGERY [facility];  Service: General    MI ERCP,DIAGNOSTIC N/A 01/14/2022    Procedure: ERCP, DIAGNOSTIC - WITH SIGMOID COLON BIOPSY AND STENT REMOVAL;  Surgeon: Cr Haro M.D.;  Location: [facility];  Service: Gastroenterology    THADDEUS BY LAPAROSCOPY N/A 10/28/2021    Procedure: " CHOLECYSTECTOMY, LAPAROSCOPIC;  Surgeon: Tello Trammell M.D.;  Location: [facility];  Service: General    MS ERCP,DIAGNOSTIC N/A 10/26/2021    Procedure: ERCP (ENDOSCOPIC RETROGRADE CHOLANGIOPANCREATOGRAPHY);  Surgeon: Cr Haro M.D.;  Location: SURGERY SAME DAY [facility];  Service: Gastroenterology    MS UPPER GI ENDOSCOPY,BIOPSY N/A 10/26/2021    Procedure: GASTROSCOPY, WITH BIOPSY;  Surgeon: Cr Haro M.D.;  Location: SURGERY SAME DAY [facility];  Service: Gastroenterology    MS UPPER GI ENDOSCOPY,DIAGNOSIS N/A 10/26/2021    Procedure: GASTROSCOPY;  Surgeon: Cr Haro M.D.;  Location: SURGERY SAME DAY [facility];  Service: Gastroenterology    CATH PLACEMENT  01/25/2020    Procedure: INSERTION, CATHETER PERM;  Surgeon: Rola Mendoza M.D.;  Location: [facility];  Service: General    IRRIGATION & DEBRIDEMENT NEURO  01/19/2020    Procedure: IRRIGATION AND DEBRIDEMENT, WOUND THORACIC AND LUMBAR;  Surgeon: Ryan Roman M.D.;  Location: [facility];  Service: Neurosurgery    MS INS/RPLC SPI NPG/RCVR POCKET N/A 12/16/2019    Procedure: EXPLORATION AT THORACIC 8 - 9, REPLACEMENT OF  NEUROSTIMULATOR LEAD;  Surgeon: Ryan Roman M.D.;  Location: [facility];  Service: Neurosurgery    SPINAL CORD STIMULATOR N/A 10/26/2018    Procedure: SPINAL CORD STIMULATOR;  Surgeon: Ryan Roman M.D.;  Location: [facility];  Service: Neurosurgery    THORACIC LAMINECTOMY N/A 10/26/2018    Procedure: THORACIC LAMINECTOMY - FOR;  Surgeon: Ryan Roman M.D.;  Location: [facility];  Service: Neurosurgery    MS NJX AA&/STRD TFRML EPI LUMBAR/SACRAL 1 LEVEL Right 08/31/2016    Procedure: INJ-FORAMEN EPI LUM/SAC SNGL L4-5;  Surgeon: Sukhi Godfrey M.D.;  Location: [facility];  Service: Pain Management    LUMBAR LAMINECTOMY DISKECTOMY Right 05/10/2016    Procedure: LUMBAR L4-5 HEMILAMINECTOMY DISKECTOMY ;  Surgeon: Arnold Keyes M.D.;   Location: SURGERY [facility];  Service:     CERVICAL FUSION POSTERIOR  01/16/2009    Performed by TARA CONTRERAS at SURGERY [facility]    HARDWARE REMOVAL NEURO  01/16/2009    Performed by TARA CONTRERAS at SURGERY [facility]    NECK EXPLORATION  01/16/2009    Performed by TARA CONTRERAS at SURGERY [facility]    SHOULDER ARTHROSCOPY W/ ROTATOR CUFF REPAIR  10/09/2008    Performed by SHERLY CASTANEDA at [facility]    SHOULDER DECOMPRESSION ARTHROSCOPIC  06/17/2008    Performed by SHERLY CASTANEDA at [facility]    CLAVICLE DISTAL EXCISION  06/17/2008    Performed by SHERLY CASTANEDA at [facility]    CERVICAL DISK AND FUSION ANTERIOR  03/12/2008    HYSTERECTOMY, VAGINAL  2006    PARTIAL    APPENDECTOMY  2004    THYROID LOBECTOMY  1973    TONSILLECTOMY  1963    CATARACT PHACO WITH IOL      LUMPECTOMY  1976, 2005    Breast     LUMPECTOMY      OTHER ABDOMINAL SURGERY  2004    OTHER CARDIAC SURGERY  2020 stents inserted       FAMILY HX:  Family History   Problem Relation Age of Onset    Hypertension Mother     Cancer Father        SOCIAL HX:  Social History     Socioeconomic History    Marital status:      Spouse name: Not on file    Number of children: Not on file    Years of education: Not on file    Highest education level: Not on file   Occupational History    Not on file   Tobacco Use    Smoking status: Every Day     Current packs/day: 0.50     Average packs/day: 0.5 packs/day for 30.0 years (15.0 ttl pk-yrs)     Types: Cigarettes    Smokeless tobacco: Never    Tobacco comments:     Currently smokes 1/2 - 3/4 a pack daily.  Has smoked for about 50 years.   Vaping Use    Vaping status: Never Used   Substance and Sexual Activity    Alcohol use: Not Currently    Drug use: Not Currently     Types: Inhaled, Oral, Marijuana     Comment: Marijuana - Occasional; edibles    Sexual activity: Not Currently   Other Topics Concern    Not on file   Social History  Narrative    Not on file     Social Drivers of Health     Financial Resource Strain: Medium Risk (10/16/2024)    Overall Financial Resource Strain (CARDIA)     Difficulty of Paying Living Expenses: Somewhat hard   Food Insecurity: Patient Declined (1/22/2025)    Hunger Vital Sign     Worried About Running Out of Food in the Last Year: Patient declined     Ran Out of Food in the Last Year: Patient declined   Transportation Needs: Patient Declined (1/22/2025)    PRAPARE - Transportation     Lack of Transportation (Medical): Patient declined     Lack of Transportation (Non-Medical): Patient declined   Physical Activity: Inactive (10/16/2024)    Exercise Vital Sign     Days of Exercise per Week: 0 days     Minutes of Exercise per Session: 0 min   Stress: Stress Concern Present (10/16/2024)    [employer] - Occupational Stress Questionnaire     Feeling of Stress : Very much   Social Connections: Feeling Socially Integrated (1/12/2025)    OASIS : Social Isolation     Frequency of experiencing loneliness or isolation: Rarely   Recent Concern: Social Connections - Socially Isolated (10/16/2024)    Social Connection and Isolation Panel [NHANES]     Frequency of Communication with Friends and Family: Three times a week     Frequency of Social Gatherings with Friends and Family: More than three times a week     Attends Judaism Services: Never     Active Member of Clubs or Organizations: No     Attends Club or Organization Meetings: Never     Marital Status:    Intimate Partner Violence: Patient Declined (1/22/2025)    Humiliation, Afraid, Rape, and Kick questionnaire     Fear of Current or Ex-Partner: Patient declined     Emotionally Abused: Patient declined     Physically Abused: Patient declined     Sexually Abused: Patient declined   Housing Stability: Patient Declined (1/22/2025)    Housing Stability Vital Sign     Unable to Pay for Housing in the Last Year: Patient declined      Number of Times Moved in the Last Year: 0     Homeless in the Last Year: Patient declined     Social History     Tobacco Use   Smoking Status Every Day    Current packs/day: 0.50    Average packs/day: 0.5 packs/day for 30.0 years (15.0 ttl pk-yrs)    Types: Cigarettes   Smokeless Tobacco Never   Tobacco Comments    Currently smokes 1/2 - 3/4 a pack daily.  Has smoked for about 50 years.     Social History     Substance and Sexual Activity   Alcohol Use Not Currently       Allergies/Intolerances:  Allergies   Allergen Reactions    Tape Unspecified and Rash     Blisters, paper tape is ok  Other reaction(s): Rash         Other Current Medications:    Current Facility-Administered Medications:     heparin intracatheter (for DIALYSIS USE ONLY) 1,800 Units, 1,800 Units, Intracatheter, DIALYSIS PRN, Ankit Diggs M.D., 1,800 Units at 01/28/25 1240    heparin intracatheter (for DIALYSIS USE ONLY) 1,800 Units, 1,800 Units, Intracatheter, DIALYSIS PRN, Ankit Diggs M.D., 1,800 Units at 01/28/25 1240    MD Alert...Vancomycin per Pharmacy, , Other, PHARMACY TO DOSE, Payton Han M.D.    torsemide (Demadex) tablet 100 mg, 100 mg, Oral, Q DAY, Ankit Diggs M.D., 100 mg at 01/28/25 0601    NS (Bolus) 0.9 % infusion 100 mL, 100 mL, Intravenous, DIALYSIS PRN, Ankit Diggs M.D.    methocarbamol (Robaxin) tablet 750 mg, 750 mg, Oral, TID, Tomi Rose D.OChey, 750 mg at 01/28/25 1319    petrolatum 42 % ointment, , Topical, BID, Dalton Fowler M.D., Given at 01/28/25 0602    insulin GLARGINE (Lantus,Semglee) injection, 15 Units, Subcutaneous, Q EVENING, Dalton Fowler M.D., 15 Units at 01/27/25 1748    gabapentin (Neurontin) capsule 200 mg, 200 mg, Oral, Daily-0600, Jigar Cabrales M.D., 200 mg at 01/28/25 0602    metoprolol SR (Toprol XL) tablet 50 mg, 50 mg, Oral, Q EVENING, Jigar Cabrales M.D., 50 mg at 01/27/25 1755    amLODIPine (Norvasc) tablet 10 mg, 10 mg, Oral, DAILY, Cr Sánchez M.D., 10 mg at 01/28/25 0602    atorvastatin  (Lipitor) tablet 40 mg, 40 mg, Oral, Q EVENING, Cr Sánchez M.D., 40 mg at 01/27/25 1755    DULoxetine (Cymbalta) capsule 30 mg, 30 mg, Oral, DAILY, Cr Sánchez M.D., 30 mg at 01/28/25 0602    levothyroxine (Synthroid) tablet 250 mcg, 250 mcg, Oral, AM ES, Cr Sánchez M.D., 250 mcg at 01/28/25 0602    traZODone (Desyrel) tablet 150 mg, 150 mg, Oral, QHS, Cr Sánchez M.D., 150 mg at 01/27/25 2054    venlafaxine XR (Effexor XR) capsule 150 mg, 150 mg, Oral, DAILY, Cr Sánchez M.D., 150 mg at 01/28/25 0602    [Held by provider] heparin injection 5,000 Units, 5,000 Units, Subcutaneous, Q8HRS, Cr Sánchez M.D., 5,000 Units at 01/27/25 1532    acetaminophen (Tylenol) tablet 650 mg, 650 mg, Oral, Q6HRS PRN, Cr Sánchez M.D., 650 mg at 01/24/25 1147    senna-docusate (Pericolace Or Senokot S) 8.6-50 MG per tablet 2 Tablet, 2 Tablet, Oral, Q EVENING, 2 Tablet at 01/27/25 1755 **AND** polyethylene glycol/lytes (Miralax) Packet 1 Packet, 1 Packet, Oral, QDAY PRN, Cr Sánchez M.D., 1 Packet at 01/24/25 0512    insulin lispro (HumaLOG,AdmeLOG) subcutaneous injection, 2-9 Units, Subcutaneous, 4X/DAY ACHS, 3 Units at 01/27/25 1748 **AND** POC blood glucose manual result, , , Q AC AND BEDTIME(S) **AND** NOTIFY MD and PharmD, , , Once **AND** Administer 20 grams of glucose (approximately 8 ounces of fruit juice) every 15 minutes PRN FSBG less than 70 mg/dL, , , PRN **AND** dextrose 50% (D50W) injection 25 g, 25 g, Intravenous, Q15 MIN PRN, Cr Sánchez M.D., 25 g at 01/28/25 0805    Pharmacy Consult Request ...Pain Management Review 1 Each, 1 Each, Other, PHARMACY TO DOSE, MARK Castillo    oxyCODONE immediate-release (Roxicodone) tablet 5 mg, 5 mg, Oral, Q3HRS PRN, 5 mg at 01/26/25 0618 **OR** oxyCODONE immediate release (Roxicodone) tablet 10 mg, 10 mg, Oral, Q3HRS PRN, 10 mg at 01/28/25 1319 **OR** HYDROmorphone (Dilaudid) injection 0.5 mg, 0.5 mg, Intravenous, Q3HRS PRN, Anny MARIN  "Sarah, A.P.R.N., 0.5 mg at 25 1433      Most Recent Vital Signs:  BP (!) 142/58   Pulse 68   Temp 36.1 °C (96.9 °F) (Temporal)   Resp 16   Ht 1.676 m (5' 6\")   Wt 57.4 kg (126 lb 8.7 oz)   LMP 2005 (LMP Unknown)   SpO2 97%   BMI 20.42 kg/m²   Temp  Av.4 °C (97.5 °F)  Min: 35.8 °C (96.4 °F)  Max: 36.9 °C (98.4 °F)    Physical Exam:  General: well-appearing, well nourished no acute distress  HEENT: NCAT, PERRLA, sclera anicteric, PERRL, EOMI,  no oral lesions Dentition  Neck: supple, no lymphadenopathy  Chest: CTAB, unlabored.  Cardiac: RRR,  no m/r/g   Abdomen: + bowel sounds, soft, non-tender, non-distended  Extremities: No cyanosis, clubbing. no edema, 2+ pulses  Skin: no rashes   Neuro: Alert and oriented times 3, Speech fluent CN intact TAO  Psych: Mood normal Affect normal    Pertinent Lab Results:  Recent Labs     25  0621   WBC 6.9      Recent Labs     25  0621   HEMOGLOBIN 10.5*   HEMATOCRIT 32.7*   MCV 98.5*   MCH 31.6   PLATELETCT 151*         Recent Labs     25  0621 25  0032 25  0241   SODIUM 137 136 136   POTASSIUM 5.4 5.4 5.0   CHLORIDE 105 107 106   CO2 18* 16* 18*   CREATININE 3.61* 3.88* 3.80*        No results for input(s): \"ALBUMIN\" in the last 72 hours.    Invalid input(s): \"AST\", \"ALT\", \"ALKPHOS\", \"BILITOT\", \"TOTALBILIRUB\", \"BILIRUBINTOT\", \"BILIRUBINDIR\", \"BILIRUBININD\", \"ALKALINEPHOS\"     Pertinent Micro:  Results       Procedure Component Value Units Date/Time    BLOOD CULTURE [[id number]]  (Abnormal) Collected: 25 1859    Order Status: Completed Specimen: Blood from Peripheral Updated: 25 1214     Significant Indicator POS     Source BLD     Site PERIPHERAL     Culture Result Growth detected by automated blood culture system.  2025  12:13  Gram Stain: Gram positive cocci in pairs/chains.      BLOOD CULTURE [[id number]] Collected: 25 0241    Order Status: Completed Specimen: Blood from Peripheral Updated: 25 " "0608     Significant Indicator NEG     Source BLD     Site PERIPHERAL     Culture Result No Growth  Note: Blood cultures are incubated for 5 days and  are monitored continuously.Positive blood cultures  are called to the RN and reported as soon as  they are identified.            No results found for: \"BLOODCULTU\", \"BLDCULT\", \"BCHOLD\"     Studies:  1.  Postoperative changes in the cervical spine as described above.     2.  Abnormal signal is noted in the prevertebral soft tissues extending from the mid C2 to the C7-T1 level. Abnormal marrow signal is noted at the C7-T1 level with mild enhancement of the intervertebral disc space at this level. These findings are   concerning for discitis-osteomyelitis.     3.  There is moderate spinal canal stenosis at C6-7.  IMPRESSION:   Abnormal MRI C spine  Suspected vertebral osteomyelitis-IR and Neurosurgery declined biopsy  Blood cult now +GPC in chains        PLAN:   BCxs + likely strep  Repeat Bcxs every 48 hours until neg  TTE; RAFIQ if persistently positive blood cultures  Start vancomycin pending ID  At risk for enterococcus  Anticipate 6 weeks IV abx from date of negative blood cxs    PICC -no Antibiotics will be dosed after dialysis  Plan of care discussed with LUCIO Esposito D.O.. Will continue to follow    Payton Han M.D.    "

## 2025-01-28 NOTE — CASE COMMUNICATION
Quality Review for 1.28.25 Transfer OASIS performed on by DARCY Almonte RN on 1.28.2025:    Edits completed by DARCY Almonte RN:  1. Changed  E to yes per POC

## 2025-01-28 NOTE — PROGRESS NOTES
Hospital Medicine Daily Progress Note    Date of Service  1/28/2025    Chief Complaint  Anu Manuel is a 69 y.o. female admitted 1/22/2025 with neck pain     Hospital Course  Anu Manuel is a 69 y.o. female with past medical history of insulin-dependent diabetes mellitus, ESRD on dialysis, CAD, recent admission for acute on chronic back pain who presented 1/22/2025 with bilateral arm pain.  MRI of the cervical spine from 1/8/2025 revealed abnormal bone marrow signal with raising the possibility of infection and short-term follow-up imaging was recommended. Nephrology consulted for scheduled dialysis.  MRI cervical spine showed C7-T1 findings concerning for discitis/osteomyelitis with moderate spinal canal stenosis at C6-7.  Neurosurgery was consulted recommended IR bone biopsy and conservative management.  Did not recommend surgery at this time.  Discussed with IR however they do not perform cervical bone biopsies.  Patient found to be bacteremic and infectious disease was consulted started on IV antibiotics.    Interval Problem Update  1/28 vital stable on room air, afebrile  Creatinine 3.80, GFR 12  Blood cultures 1/2 Gram stain with gram-positive cocci in pairs  Notified by nursing patient with severe hypoglycemia this morning blood glucose 37, given IV dextrose, repeat 112  Discussed with interventional radiology, they do not perform cervical bone biopsies  Discussed with neurosurgery recommending conservative nonoperative treatment for now recommended IV antibiotics  Discussed with infectious disease started on vancomycin, recommended an echocardiogram  Echo pending  Patient very tearful and anxious at time of my evaluation.  She reports the  oral oxycodone takes her pain from 10 to a 6 but she has been having burning pains in her left arm.  After discussion with pharmacy will increase gabapentin to 300 mg which is max for her ESRD.  Start scheduled Tylenol.  Continues to require IV  Dilaudid for breakthrough pain, continue to monitor respiratory status closely      I have discussed this patient's plan of care and discharge plan at IDT rounds today with Case Management, Nursing, Nursing leadership, and other members of the IDT team.    Consultants/Specialty  nephrology    Code Status  DNAR/DNI    Disposition  The patient is not medically cleared for discharge to home or a post-acute facility.  Anticipate discharge to: home with close outpatient follow-up    I have placed the appropriate orders for post-discharge needs.    Review of Systems  Review of Systems   Constitutional:  Negative for chills and fever.   Respiratory:  Negative for shortness of breath.    Cardiovascular:  Negative for chest pain.   Gastrointestinal:  Negative for abdominal pain and nausea.   Musculoskeletal:  Positive for joint pain and myalgias.   Neurological:  Positive for weakness.   Psychiatric/Behavioral:  The patient is nervous/anxious.         Physical Exam  Temp:  [36.1 °C (96.9 °F)-36.9 °C (98.4 °F)] 36.1 °C (96.9 °F)  Pulse:  [55-83] 68  Resp:  [14-18] 16  BP: (114-142)/(58-75) 142/58  SpO2:  [93 %-98 %] 97 %    Physical Exam  Constitutional:       General: She is in acute distress (2/2 pain).      Appearance: Normal appearance. She is ill-appearing.   HENT:      Mouth/Throat:      Pharynx: Oropharynx is clear.   Eyes:      Conjunctiva/sclera: Conjunctivae normal.   Cardiovascular:      Rate and Rhythm: Normal rate and regular rhythm.      Pulses: Normal pulses.   Pulmonary:      Effort: Pulmonary effort is normal. No respiratory distress.      Breath sounds: No wheezing.   Abdominal:      General: There is distension.      Palpations: Abdomen is soft.      Tenderness: There is no abdominal tenderness.   Musculoskeletal:         General: Tenderness (Left arm) present.      Right lower leg: No edema.      Left lower leg: No edema.   Skin:     General: Skin is warm.   Neurological:      General: No focal deficit  present.      Mental Status: She is alert and oriented to person, place, and time.      Comments: Overall motor strength 5 out of 5 all extremity, does have weak left hand , sensory intact   Psychiatric:         Mood and Affect: Mood is anxious. Affect is tearful.         Fluids    Intake/Output Summary (Last 24 hours) at 1/28/2025 1633  Last data filed at 1/28/2025 1200  Gross per 24 hour   Intake 740 ml   Output 1000 ml   Net -260 ml          Laboratory  Recent Labs     01/26/25  0621   WBC 6.9   RBC 3.32*   HEMOGLOBIN 10.5*   HEMATOCRIT 32.7*   MCV 98.5*   MCH 31.6   MCHC 32.1*   RDW 64.2*   PLATELETCT 151*   MPV 10.3     Recent Labs     01/26/25  0621 01/27/25  0032 01/28/25  0241   SODIUM 137 136 136   POTASSIUM 5.4 5.4 5.0   CHLORIDE 105 107 106   CO2 18* 16* 18*   GLUCOSE 132* 135* 97   BUN 74* 75* 84*   CREATININE 3.61* 3.88* 3.80*   CALCIUM 7.6* 7.9* 8.0*                   Imaging  MR-CERVICAL SPINE-WITH & W/O   Final Result         1.  Postoperative changes in the cervical spine as described above.      2.  Abnormal signal is noted in the prevertebral soft tissues extending from the mid C2 to the C7-T1 level. Abnormal marrow signal is noted at the C7-T1 level with mild enhancement of the intervertebral disc space at this level. These findings are    concerning for discitis-osteomyelitis.      3.  There is moderate spinal canal stenosis at C6-7.         US-RUQ   Final Result      1.  Prior cholecystectomy.      2.  Common bile duct diameter measured at 12 mm with some intrahepatic biliary ductal dilatation. This may be related to presence of prior cholecystectomy.      3.  The liver is heterogeneous consistent with fatty change versus hepatocellular dysfunction.      DX-CHEST-PORTABLE (1 VIEW)   Final Result         1.  No acute cardiopulmonary disease.   2.  Atherosclerosis      EC-ECHOCARDIOGRAM COMPLETE W/O CONT    (Results Pending)        Assessment/Plan  * Radiculopathy affecting upper extremity-  (present on admission)  Assessment & Plan  Her pain has been debilitating despite Neurontin and Cymbalta.  She had the abnormal MRI noted below.  Because of this the MRI will be repeated as she may benefit from spine surgery consultation for either inpatient or outpatient management based on the results.  .  She will be given oral narcotics and no further IV narcotics at this time in order to start the transition of discharge home eventually on oral   Continue on Cymbalta, gabapentin  MRI C-spine showed osteomyelitis/discitis  Requiring frequent IV narcotics, close monitoring for toxicity while on IV controlled substance.   Neurosurgery recommending conservative management, no surgical intervention at this time  Infectious disease consulted    Bacteremia- (present on admission)  Assessment & Plan  1/2 blood cultures from admission positive for gram-positive cocci in pairs, pending speciation  Infectious disease consulted  -Start vancomycin  -Echocardiogram pending  -Repeat blood cultures per ID    Acute osteomyelitis of cervical spine (HCC)- (present on admission)  Assessment & Plan  MRI showed C7-T1 findings concerning for discitis osteomyelitis  Infectious disease consulted  -Vancomycin  Follow-up blood culture result  Neurosurgery recommended conservative management with IV antibiotics, no surgical intervention  Discussed with IR, unable to perform cervical spine bone biopsy    CKD (chronic kidney disease) stage 4, GFR 15-29 ml/min ([facility])- (present on admission)  Assessment & Plan  Continue on torsemide 100 mg daily  Repeat BMP in a.m. to monitor renal function  Nephrology following for dialysis needs      Intractable back pain- (present on admission)  Assessment & Plan  Also complaining of severe burning neuropathic pain in her arm  Increase gabapentin 300 mg daily, continue duloxetine  Start scheduled Tylenol 500 mg every 6 hours  Continue oral and IV opiates as needed    Type 2 diabetes mellitus, with long-term  current use of insulin (HCC)- (present on admission)  Assessment & Plan  On sliding scale and glargine  Monitor of for hypoglycemic episode    DNR (do not resuscitate)- (present on admission)  Assessment & Plan  Per her wishes  She has been followed by palliative care in the past        ESRD needing dialysis (HCC)- (present on admission)  Assessment & Plan  She has a left arm fistula  Nephrology will be consulted for dialysis    CAD S/P percutaneous coronary angioplasty- (present on admission)  Assessment & Plan  History of    Primary hypertension- (present on admission)  Assessment & Plan  Continue Norvasc and Toprol with holding parameters         VTE prophylaxis: heparin SC    I have performed a physical exam and reviewed and updated ROS and Plan today (1/28/2025). In review of yesterday's note (1/27/2025), there are no changes except as documented above.

## 2025-01-28 NOTE — DISCHARGE PLANNING
Outpatient Dialysis Note     Confirmed patient is active at:     [facility]  [address] [place] [id number]    (New outpatient schedule to start upon discharge. Provided to patient)  Schedule: Mon, Fri  Time: 2:45 PM     Patient is followed by Dr. Justin Branch with [facility] at facility who confirmed patient information.   Forwarded records for review.     Xitlaly Reyes   Dialysis Coordinator / [facility]  Ph: [phone]

## 2025-01-28 NOTE — CARE PLAN
Problem: Pain - Standard  Goal: Alleviation of pain or a reduction in pain to the patient’s comfort goal  Description: Target End Date:  Prior to discharge or change in level of care    Document on Vitals flowsheet    1.  Document pain using the appropriate pain scale per order or unit policy  2.  Educate and implement non-pharmacologic comfort measures (i.e. relaxation, distraction, massage, cold/heat therapy, etc.)  3.  Pain management medications as ordered  4.  Reassess pain after pain med administration per policy  5.  If opiods administered assess patient's response to pain medication is appropriate per POSS sedation scale  6.  Follow pain management plan developed in collaboration with patient and interdisciplinary team (including palliative care or pain specialists if applicable)  Outcome: Progressing   The patient is Stable - Low risk of patient condition declining or worsening    Shift Goals  Clinical Goals: pain at comfort goal for patient  Patient Goals: Comfort, safety  Family Goals: RILEY    Progress made toward(s) clinical / shift goals: pain assessed and managed with medication.

## 2025-01-28 NOTE — PROGRESS NOTES
Nephrology Daily Progress Note    Date of Service  1/27/2025    Chief Complaint  69 y.o. female admitted 1/22/2025 with ESRD, bilateral arm weakness     Interval Problem Update  Patient is complaining of severe headache and neck pain  1/24 patient feels better today   unfortunately 24-hour urine was not being collected  We will start 24-hour urine study for creatinine clearance today  1/26 patient is complaining of neuropathy pain in her arms  1/27 -still complains of pain, mostly in her left arm.  Denies chest pain, shortness of breath    Review of Systems  Review of Systems   Constitutional:  Negative for fever.   Respiratory:  Negative for shortness of breath.    Cardiovascular:  Negative for chest pain.   Gastrointestinal:  Negative for abdominal pain.   All other systems reviewed and are negative.       Physical Exam  Temp:  [36.4 °C (97.6 °F)-36.6 °C (97.8 °F)] 36.6 °C (97.8 °F)  Pulse:  [70-83] 83  Resp:  [16-18] 18  BP: (120-143)/(72-89) 138/75  SpO2:  [92 %-99 %] 96 %    Physical Exam  Constitutional:       General: She is not in acute distress.     Appearance: She is well-developed.   HENT:      Head: Normocephalic and atraumatic.      Mouth/Throat:      Mouth: Mucous membranes are moist.      Pharynx: Oropharynx is clear. No oropharyngeal exudate.   Eyes:      General: No scleral icterus.     Extraocular Movements: Extraocular movements intact.   Neck:      Trachea: No tracheal deviation.   Cardiovascular:      Rate and Rhythm: Normal rate and regular rhythm.      Heart sounds: Normal heart sounds. No murmur heard.  Pulmonary:      Effort: Pulmonary effort is normal.      Breath sounds: No stridor. No rales.   Abdominal:      Palpations: Abdomen is soft.      Tenderness: There is no abdominal tenderness.   Musculoskeletal:         General: Normal range of motion.      Cervical back: Normal range of motion. No rigidity.      Right lower leg: No edema.      Left lower leg: No edema.   Skin:     General:  Skin is warm and dry.   Neurological:      General: No focal deficit present.      Mental Status: She is alert and oriented to person, place, and time.   Psychiatric:         Mood and Affect: Mood normal.         Behavior: Behavior normal.     Dialysis access: Right IJ permacath.  Left arm AV graft without bruit or thrill    Fluids    Intake/Output Summary (Last 24 hours) at 1/27/2025 2214  Last data filed at 1/27/2025 2000  Gross per 24 hour   Intake 240 ml   Output --   Net 240 ml       Laboratory  Labs reviewed, pertinent labs below.  Recent Labs     01/26/25  0621   WBC 6.9   RBC 3.32*   HEMOGLOBIN 10.5*   HEMATOCRIT 32.7*   MCV 98.5*   MCH 31.6   MCHC 32.1*   RDW 64.2*   PLATELETCT 151*   MPV 10.3     Recent Labs     01/25/25  0305 01/26/25  0621 01/27/25  0032   SODIUM 137 137 136   POTASSIUM 5.2 5.4 5.4   CHLORIDE 108 105 107   CO2 18* 18* 16*   GLUCOSE 72 132* 135*   BUN 67* 74* 75*   CREATININE 3.45* 3.61* 3.88*   CALCIUM 7.8* 7.6* 7.9*               URINALYSIS:  Lab Results   Component Value Date/Time    COLORURINE DK Yellow 02/28/2024 0544    CLARITY Clear 02/28/2024 0544    SPECGRAVITY 1.021 02/28/2024 0544    PHURINE 5.5 02/28/2024 0544    KETONES Negative 02/28/2024 0544    PROTEINURIN 300 (A) 02/28/2024 0544    BILIRUBINUR Small (A) 02/28/2024 0544    UROBILU 1.0 02/28/2024 0544    NITRITE Negative 02/28/2024 0544    LEUKESTERAS Negative 02/28/2024 0544    OCCULTBLOOD Trace (A) 02/28/2024 0544     UPC  Lab Results   Component Value Date/Time    TOTPROTUR 557.0 (H) 02/28/2024 0544      Lab Results   Component Value Date/Time    CREATININEU 82.41 02/28/2024 0544         Imaging interpreted by radiologist. Imaging reports reviewed with pertinent findings below  MR-CERVICAL SPINE-WITH & W/O   Final Result         1.  Postoperative changes in the cervical spine as described above.      2.  Abnormal signal is noted in the prevertebral soft tissues extending from the mid C2 to the C7-T1 level. Abnormal  marrow signal is noted at the C7-T1 level with mild enhancement of the intervertebral disc space at this level. These findings are    concerning for discitis-osteomyelitis.      3.  There is moderate spinal canal stenosis at C6-7.         US-RUQ   Final Result      1.  Prior cholecystectomy.      2.  Common bile duct diameter measured at 12 mm with some intrahepatic biliary ductal dilatation. This may be related to presence of prior cholecystectomy.      3.  The liver is heterogeneous consistent with fatty change versus hepatocellular dysfunction.      DX-CHEST-PORTABLE (1 VIEW)   Final Result         1.  No acute cardiopulmonary disease.   2.  Atherosclerosis      IR-CONSULT AND TREAT    (Results Pending)         Current Facility-Administered Medications   Medication Dose Route Frequency Provider Last Rate Last Admin    sodium bicarbonate tablet 1,950 mg  1,950 mg Oral BID Ankit Diggs M.D.   1,950 mg at 01/27/25 1755    torsemide (Demadex) tablet 100 mg  100 mg Oral Q DAY Ankit Diggs M.DChey   100 mg at 01/27/25 1127    petrolatum 42 % ointment   Topical BID Dalton Fowler M.D.   Given at 01/27/25 1800    insulin GLARGINE (Lantus,Semglee) injection  15 Units Subcutaneous Q EVENING Dalton Fowler M.D.   15 Units at 01/27/25 1748    gabapentin (Neurontin) capsule 200 mg  200 mg Oral Daily-0600 Jigar Cabrales M.D.   200 mg at 01/27/25 0445    metoprolol SR (Toprol XL) tablet 50 mg  50 mg Oral Q EVENING Jigar Cabrales M.D.   50 mg at 01/27/25 1755    amLODIPine (Norvasc) tablet 10 mg  10 mg Oral DAILY Cr Sánchez M.D.   10 mg at 01/27/25 0445    atorvastatin (Lipitor) tablet 40 mg  40 mg Oral Q EVENING Cr Sánchez M.D.   40 mg at 01/27/25 1755    DULoxetine (Cymbalta) capsule 30 mg  30 mg Oral DAILY Cr Sánchez M.D.   30 mg at 01/27/25 0446    levothyroxine (Synthroid) tablet 250 mcg  250 mcg Oral AM ES Cr Sánchez M.D.   250 mcg at 01/27/25 0445    traZODone (Desyrel) tablet 150 mg  150 mg Oral QHS Cr AVALOS  DIPESH Sánchez   150 mg at 01/27/25 2054    venlafaxine XR (Effexor XR) capsule 150 mg  150 mg Oral DAILY Cr Sánchez M.D.   150 mg at 01/27/25 0446    [Held by provider] heparin injection 5,000 Units  5,000 Units Subcutaneous Q8HRS Cr Sánchez M.D.   5,000 Units at 01/27/25 1532    acetaminophen (Tylenol) tablet 650 mg  650 mg Oral Q6HRS PRN Cr Sánchez M.D.   650 mg at 01/24/25 1147    senna-docusate (Pericolace Or Senokot S) 8.6-50 MG per tablet 2 Tablet  2 Tablet Oral Q EVENING Cr Sánchez M.D.   2 Tablet at 01/27/25 1755    And    polyethylene glycol/lytes (Miralax) Packet 1 Packet  1 Packet Oral QDAY PRN Cr Sánchez M.D.   1 Packet at 01/24/25 0512    insulin lispro (HumaLOG,AdmeLOG) subcutaneous injection  2-9 Units Subcutaneous 4X/DAY ACHS Cr Sánchez M.D.   3 Units at 01/27/25 1748    And    dextrose 50% (D50W) injection 25 g  25 g Intravenous Q15 MIN PRN Cr Sánchez M.D.        methocarbamol (Robaxin) tablet 750 mg  750 mg Oral 4X/DAY PRN Cr Sánchez M.D.   750 mg at 01/27/25 1544    Pharmacy Consult Request ...Pain Management Review 1 Each  1 Each Other PHARMACY TO DOSE MILENA Castillo.P.R.N.        oxyCODONE immediate-release (Roxicodone) tablet 5 mg  5 mg Oral Q3HRS PRN MILENA Castillo.P.R.N.   5 mg at 01/26/25 0618    Or    oxyCODONE immediate release (Roxicodone) tablet 10 mg  10 mg Oral Q3HRS PRN Anny Smith A.P.R.N.   10 mg at 01/27/25 1951    Or    HYDROmorphone (Dilaudid) injection 0.5 mg  0.5 mg Intravenous Q3HRS PRN Anny Smith A.P.R.N.   0.5 mg at 01/27/25 2054         Assessment/Plan  69 y.o. female admitted 1/22/2025 with ESRD, bilateral arm weakness    1.  ESRD on hemodialysis Monday Wednesday Friday, but noncompliant.  Non-Oliguric.  Plan on dialysis tomorrow, due to MRI contrast given this afternoon, then likely will be able to continue dialysis twice weekly.  Check daily weights.  Avoid NSAIDs and other nephrotoxins as the patient  "still urinates.  Check renal function panel daily.    2.  Dialysis access: Right IJ permacath.  Left arm AV graft has clotted.  No need for left arm precautions.      3.  Arm weakness and left arm pain, reason for admission.   Cervical MRI concerning for osteomyelitis.  Plan on hemodialysis early 1/28/2025 due to gadolinium administration.  Pending IR biopsy of cervical spine    4.  Anemia of chronic disease.  No acute need for ANDREINA's with hemoglobin over 10.  Check CBC at least 3 times per week.    5.  Hypertension.  Mildly uncontrolled.  Will continue ultrafiltration as tolerated by blood pressure, as high blood pressure usually improves with ultrafiltration with dialysis.  Recommend low-sodium diet.       6.  Hyperphosphatemia, controlled.  Recommend low phosphorus diet.  No acute need for phosphorus binders.   Check \"renal function panel\" daily (includes phosphorus level).       Ankit Diggs MD  Nephrology  [facility]          "

## 2025-01-28 NOTE — PROCEDURES
Date of service: 01/28/25    Diagnosis: End-Stage Renal Disease. Patient seen and examined on hemodialysis during treatment. Patient is stable, tolerating hemodialysis. Denies chest pain and shortness of breath. Complains of ongoing shooting left arm pain. Orders updated as needed. Please refer to flowsheet for full details.    Access: RIJ permacath.  UF goal: 0-1L as she is non-oliguric    Plan: Continue HD twice weekly, today as she had MRI with gadolinium yesterday, but on Mon/Fri moving forward.    No need for left arm precautions. Left arm access is clotted and abandoned.     Ankit Diggs MD  Nephrology   [facility]

## 2025-01-28 NOTE — PROGRESS NOTES
[facility] Progress Note     Hemodialysis treatment ordered today per Dr. Ankit Diggs x 3 hours. A and O x 4; on room air; medicated for arm pain and leg pain by PCN at bedside prior to coming down to HD. Consent for HD signed by patient. Per pt she still urinates adequate amount. Seen and examined by Dr. Ankit Diggs during HD.    Treatment initiated at 0939 completed at 1239.      Patient tolerated treatment; see electronic flow sheet for details.   Latest Blood sugar 100 mg/dL at 1125     Net  mL.      Post tx, CVC flushed with saline then locked with heparin 1000 units/mL per designated amount in each wing then clamped and capped. Aspirate heparin prior to next CVC use.     Report given to Primary RN Carin Mondragon .

## 2025-01-29 ENCOUNTER — APPOINTMENT (OUTPATIENT)
Dept: ENDOCRINOLOGY | Facility: MEDICAL CENTER | Age: 70
End: 2025-01-29
Attending: INTERNAL MEDICINE
Payer: MEDICARE

## 2025-01-29 ENCOUNTER — HOME CARE VISIT (OUTPATIENT)
Dept: HOME HEALTH SERVICES | Facility: HOME HEALTHCARE | Age: 70
End: 2025-01-29
Payer: MEDICARE

## 2025-01-29 ENCOUNTER — APPOINTMENT (OUTPATIENT)
Dept: CARDIOLOGY | Facility: MEDICAL CENTER | Age: 70
End: 2025-01-29
Attending: INTERNAL MEDICINE
Payer: MEDICARE

## 2025-01-29 ENCOUNTER — TELEPHONE (OUTPATIENT)
Dept: ENDOCRINOLOGY | Facility: MEDICAL CENTER | Age: 70
End: 2025-01-29
Payer: MEDICARE

## 2025-01-29 VITALS
OXYGEN SATURATION: 98 % | TEMPERATURE: 99 F | SYSTOLIC BLOOD PRESSURE: 108 MMHG | HEART RATE: 73 BPM | RESPIRATION RATE: 19 BRPM | DIASTOLIC BLOOD PRESSURE: 78 MMHG

## 2025-01-29 PROBLEM — I33.0 ACUTE BACTERIAL ENDOCARDITIS: Status: ACTIVE | Noted: 2025-01-29

## 2025-01-29 LAB
BACTERIA BLD CULT: NORMAL
BACTERIA BLD CULT: NORMAL
GLUCOSE BLD STRIP.AUTO-MCNC: 212 MG/DL (ref 65–99)
GLUCOSE BLD STRIP.AUTO-MCNC: 287 MG/DL (ref 65–99)
GLUCOSE BLD STRIP.AUTO-MCNC: 295 MG/DL (ref 65–99)
GLUCOSE BLD STRIP.AUTO-MCNC: 323 MG/DL (ref 65–99)
LV EJECT FRACT MOD 2C 99903: 69.31
LV EJECT FRACT MOD 4C 99902: 68.07
LV EJECT FRACT MOD BP 99901: 69.21
SIGNIFICANT IND 70042: NORMAL
SIGNIFICANT IND 70042: NORMAL
SITE SITE: NORMAL
SITE SITE: NORMAL
SOURCE SOURCE: NORMAL
SOURCE SOURCE: NORMAL

## 2025-01-29 PROCEDURE — 93306 TTE W/DOPPLER COMPLETE: CPT | Mod: 26 | Performed by: INTERNAL MEDICINE

## 2025-01-29 PROCEDURE — 97162 PT EVAL MOD COMPLEX 30 MIN: CPT

## 2025-01-29 PROCEDURE — A9270 NON-COVERED ITEM OR SERVICE: HCPCS | Performed by: HOSPITALIST

## 2025-01-29 PROCEDURE — 770006 HCHG ROOM/CARE - MED/SURG/GYN SEMI*

## 2025-01-29 PROCEDURE — 700102 HCHG RX REV CODE 250 W/ 637 OVERRIDE(OP): Performed by: HOSPITALIST

## 2025-01-29 PROCEDURE — A9270 NON-COVERED ITEM OR SERVICE: HCPCS

## 2025-01-29 PROCEDURE — A9270 NON-COVERED ITEM OR SERVICE: HCPCS | Performed by: INTERNAL MEDICINE

## 2025-01-29 PROCEDURE — 99233 SBSQ HOSP IP/OBS HIGH 50: CPT | Performed by: INTERNAL MEDICINE

## 2025-01-29 PROCEDURE — 700105 HCHG RX REV CODE 258: Performed by: INTERNAL MEDICINE

## 2025-01-29 PROCEDURE — 700102 HCHG RX REV CODE 250 W/ 637 OVERRIDE(OP)

## 2025-01-29 PROCEDURE — 665998 HH PPS REVENUE CREDIT

## 2025-01-29 PROCEDURE — 700102 HCHG RX REV CODE 250 W/ 637 OVERRIDE(OP): Performed by: NEUROLOGICAL SURGERY

## 2025-01-29 PROCEDURE — 665999 HH PPS REVENUE DEBIT

## 2025-01-29 PROCEDURE — 700111 HCHG RX REV CODE 636 W/ 250 OVERRIDE (IP): Performed by: INTERNAL MEDICINE

## 2025-01-29 PROCEDURE — 700111 HCHG RX REV CODE 636 W/ 250 OVERRIDE (IP): Mod: JZ,TB | Performed by: INTERNAL MEDICINE

## 2025-01-29 PROCEDURE — 93306 TTE W/DOPPLER COMPLETE: CPT

## 2025-01-29 PROCEDURE — 700102 HCHG RX REV CODE 250 W/ 637 OVERRIDE(OP): Performed by: INTERNAL MEDICINE

## 2025-01-29 PROCEDURE — 700111 HCHG RX REV CODE 636 W/ 250 OVERRIDE (IP): Mod: JZ,TB

## 2025-01-29 PROCEDURE — 82962 GLUCOSE BLOOD TEST: CPT | Mod: 91

## 2025-01-29 PROCEDURE — 99232 SBSQ HOSP IP/OBS MODERATE 35: CPT | Performed by: INTERNAL MEDICINE

## 2025-01-29 PROCEDURE — 36415 COLL VENOUS BLD VENIPUNCTURE: CPT

## 2025-01-29 PROCEDURE — A9270 NON-COVERED ITEM OR SERVICE: HCPCS | Performed by: NEUROLOGICAL SURGERY

## 2025-01-29 PROCEDURE — 87040 BLOOD CULTURE FOR BACTERIA: CPT

## 2025-01-29 PROCEDURE — 700111 HCHG RX REV CODE 636 W/ 250 OVERRIDE (IP): Mod: JZ | Performed by: INTERNAL MEDICINE

## 2025-01-29 RX ORDER — SODIUM CHLORIDE 9 MG/ML
500 INJECTION, SOLUTION INTRAVENOUS ONCE
Status: COMPLETED | OUTPATIENT
Start: 2025-01-29 | End: 2025-01-29

## 2025-01-29 RX ORDER — HYDROMORPHONE HYDROCHLORIDE 1 MG/ML
0.5 INJECTION, SOLUTION INTRAMUSCULAR; INTRAVENOUS; SUBCUTANEOUS
Status: DISCONTINUED | OUTPATIENT
Start: 2025-01-29 | End: 2025-02-05 | Stop reason: HOSPADM

## 2025-01-29 RX ORDER — OXYCODONE HYDROCHLORIDE 10 MG/1
10 TABLET ORAL
Status: DISCONTINUED | OUTPATIENT
Start: 2025-01-29 | End: 2025-02-05 | Stop reason: HOSPADM

## 2025-01-29 RX ORDER — HYDROMORPHONE HYDROCHLORIDE 1 MG/ML
1 INJECTION, SOLUTION INTRAMUSCULAR; INTRAVENOUS; SUBCUTANEOUS ONCE
Status: COMPLETED | OUTPATIENT
Start: 2025-01-29 | End: 2025-01-29

## 2025-01-29 RX ORDER — OXYCODONE HYDROCHLORIDE 15 MG/1
15 TABLET ORAL
Status: DISCONTINUED | OUTPATIENT
Start: 2025-01-29 | End: 2025-02-05 | Stop reason: HOSPADM

## 2025-01-29 RX ORDER — HYDROXYZINE HYDROCHLORIDE 50 MG/1
25 TABLET, FILM COATED ORAL 4 TIMES DAILY PRN
Status: DISCONTINUED | OUTPATIENT
Start: 2025-01-29 | End: 2025-01-31

## 2025-01-29 RX ADMIN — INSULIN LISPRO 5 UNITS: 100 INJECTION, SOLUTION INTRAVENOUS; SUBCUTANEOUS at 08:43

## 2025-01-29 RX ADMIN — GABAPENTIN 300 MG: 300 CAPSULE ORAL at 05:57

## 2025-01-29 RX ADMIN — TRAZODONE HYDROCHLORIDE 150 MG: 50 TABLET ORAL at 20:38

## 2025-01-29 RX ADMIN — HYDROMORPHONE HYDROCHLORIDE 0.5 MG: 1 INJECTION, SOLUTION INTRAMUSCULAR; INTRAVENOUS; SUBCUTANEOUS at 11:54

## 2025-01-29 RX ADMIN — HYDROMORPHONE HYDROCHLORIDE 0.5 MG: 1 INJECTION, SOLUTION INTRAMUSCULAR; INTRAVENOUS; SUBCUTANEOUS at 21:54

## 2025-01-29 RX ADMIN — HEPARIN SODIUM 5000 UNITS: 5000 INJECTION, SOLUTION INTRAVENOUS; SUBCUTANEOUS at 05:58

## 2025-01-29 RX ADMIN — HYDROMORPHONE HYDROCHLORIDE 1 MG: 1 INJECTION, SOLUTION INTRAMUSCULAR; INTRAVENOUS; SUBCUTANEOUS at 14:12

## 2025-01-29 RX ADMIN — VENLAFAXINE HYDROCHLORIDE 150 MG: 75 CAPSULE, EXTENDED RELEASE ORAL at 05:57

## 2025-01-29 RX ADMIN — OXYCODONE HYDROCHLORIDE 15 MG: 15 TABLET ORAL at 20:37

## 2025-01-29 RX ADMIN — INSULIN LISPRO 3 UNITS: 100 INJECTION, SOLUTION INTRAVENOUS; SUBCUTANEOUS at 11:52

## 2025-01-29 RX ADMIN — METHOCARBAMOL 750 MG: 750 TABLET ORAL at 11:55

## 2025-01-29 RX ADMIN — DULOXETINE 30 MG: 30 CAPSULE, DELAYED RELEASE ORAL at 05:58

## 2025-01-29 RX ADMIN — METHOCARBAMOL 750 MG: 750 TABLET ORAL at 17:36

## 2025-01-29 RX ADMIN — SODIUM CHLORIDE 500 ML: 9 INJECTION, SOLUTION INTRAVENOUS at 11:59

## 2025-01-29 RX ADMIN — METOPROLOL SUCCINATE 50 MG: 50 TABLET, EXTENDED RELEASE ORAL at 17:36

## 2025-01-29 RX ADMIN — PETROLATUM: 420 OINTMENT TOPICAL at 17:38

## 2025-01-29 RX ADMIN — HEPARIN SODIUM 5000 UNITS: 5000 INJECTION, SOLUTION INTRAVENOUS; SUBCUTANEOUS at 21:54

## 2025-01-29 RX ADMIN — ACETAMINOPHEN 500 MG: 500 TABLET ORAL at 13:02

## 2025-01-29 RX ADMIN — PETROLATUM: 420 OINTMENT TOPICAL at 05:58

## 2025-01-29 RX ADMIN — OXYCODONE HYDROCHLORIDE 10 MG: 10 TABLET ORAL at 10:23

## 2025-01-29 RX ADMIN — HEPARIN SODIUM 5000 UNITS: 5000 INJECTION, SOLUTION INTRAVENOUS; SUBCUTANEOUS at 14:18

## 2025-01-29 RX ADMIN — TORSEMIDE 100 MG: 100 TABLET ORAL at 06:05

## 2025-01-29 RX ADMIN — OXYCODONE HYDROCHLORIDE 10 MG: 10 TABLET ORAL at 07:28

## 2025-01-29 RX ADMIN — METHOCARBAMOL 750 MG: 750 TABLET ORAL at 05:57

## 2025-01-29 RX ADMIN — SENNOSIDES AND DOCUSATE SODIUM 2 TABLET: 50; 8.6 TABLET ORAL at 17:36

## 2025-01-29 RX ADMIN — AMLODIPINE BESYLATE 10 MG: 10 TABLET ORAL at 05:57

## 2025-01-29 RX ADMIN — HYDROMORPHONE HYDROCHLORIDE 0.5 MG: 1 INJECTION, SOLUTION INTRAMUSCULAR; INTRAVENOUS; SUBCUTANEOUS at 08:45

## 2025-01-29 RX ADMIN — OXYCODONE HYDROCHLORIDE 15 MG: 15 TABLET ORAL at 17:36

## 2025-01-29 RX ADMIN — OXYCODONE HYDROCHLORIDE 10 MG: 10 TABLET ORAL at 13:34

## 2025-01-29 RX ADMIN — ACETAMINOPHEN 500 MG: 500 TABLET ORAL at 05:57

## 2025-01-29 RX ADMIN — LEVOTHYROXINE SODIUM 250 MCG: 0.12 TABLET ORAL at 05:58

## 2025-01-29 RX ADMIN — ACETAMINOPHEN 500 MG: 500 TABLET ORAL at 17:36

## 2025-01-29 RX ADMIN — INSULIN LISPRO 5 UNITS: 100 INJECTION, SOLUTION INTRAVENOUS; SUBCUTANEOUS at 17:42

## 2025-01-29 RX ADMIN — DAPTOMYCIN 700 MG: 500 INJECTION, POWDER, LYOPHILIZED, FOR SOLUTION INTRAVENOUS at 15:24

## 2025-01-29 ASSESSMENT — COGNITIVE AND FUNCTIONAL STATUS - GENERAL
SUGGESTED CMS G CODE MODIFIER MOBILITY: CH
MOBILITY SCORE: 24

## 2025-01-29 ASSESSMENT — ENCOUNTER SYMPTOMS
DEBILITATING PAIN: 1
SHORTNESS OF BREATH: 0
SEVERE DYSPNEA: 1
POOR JUDGMENT: 1
PAIN LOCATION - PAIN FREQUENCY: CONSTANT
PAIN LOCATION - PAIN SEVERITY: 2/10
PAIN LOCATION - RELIEVING FACTORS: LYING DOWN
NERVOUS/ANXIOUS: 1
FEVER: 0
DEPRESSED MOOD: 1
HIGHEST PAIN SEVERITY IN PAST 24 HOURS: 10/10
PAIN: 1
MYALGIAS: 1
NAUSEA: 0
CHILLS: 0
SUBJECTIVE PAIN PROGRESSION: WAXING AND WANING
PAIN LOCATION: BACK
PAIN LOCATION - EXACERBATING FACTORS: STANDING
WEAKNESS: 1
NECK PAIN: 1
LOWEST PAIN SEVERITY IN PAST 24 HOURS: 2/10
ABDOMINAL PAIN: 0
PAIN SEVERITY GOAL: 0/10

## 2025-01-29 ASSESSMENT — PAIN DESCRIPTION - PAIN TYPE
TYPE: ACUTE PAIN

## 2025-01-29 ASSESSMENT — GAIT ASSESSMENTS
GAIT LEVEL OF ASSIST: SUPERVISED
DISTANCE (FEET): 200
ASSISTIVE DEVICE: FRONT WHEEL WALKER

## 2025-01-29 ASSESSMENT — FIBROSIS 4 INDEX
FIB4 SCORE: 6
FIB4 SCORE: 6

## 2025-01-29 NOTE — CASE COMMUNICATION
agree to changes made  ----- Message -----  From: Elizabeth Almonte R.N.  Sent: 1/28/2025   9:55 AM PST  To: Blanca Boston R.N.      Quality Review for 1.28.25 Transfer OASIS performed on by DARCY Almonte RN on 1.28.2025:    Edits completed by DARCY Almonte RN:  1. Changed  E to yes per POC

## 2025-01-29 NOTE — PROGRESS NOTES
Pt d/c off of unit with CNA and daughter. Pt in W/C. All belongings with pt including TLSO brace.

## 2025-01-29 NOTE — PROGRESS NOTES
Pharmacy Vancomycin Kinetics Note for 1/28/2025     69 y.o. female on Vancomycin day # 1     Vancomycin Indication (Trough based Dosing): Epidural Abscess (goal trough 15-20) (Osteomyelitis/Bacteremia)    Provider specified end date: 02/11/25    Active Antibiotics (From admission, onward)      Ordered     Ordering Provider       Tue Jan 28, 2025  2:56 PM    01/28/25 1456  vancomycin (Vancocin) 1,500 mg in  mL IVPB  (vancomycin (VANCOCIN) IV (LD + Maintenance))  ONCE         Payton Han M.D.       Tue Jan 28, 2025  2:48 PM    01/28/25 1448  MD Alert...Vancomycin per Pharmacy  PHARMACY TO DOSE        Question:  Indication(s) for vancomycin?  Answer:  Epidural abscess/vertebral osteomyelitis    Payton Han M.D.            Dosing Weight: 57.4 kg (126 lb 8.7 oz)      Admission History: Admitted on 1/22/2025 for Radiculopathy affecting upper extremity [M54.10]  Intractable back pain [M54.9]  Pertinent history: Patient was admitted for worsening back pain and arm pain, with MRI c-spine findings concerning for discitis/osteomyelitis. Patinet is afebrile, no leukocytosis. 1 of 2 blood cultures grew gram positive cocci in pairs/chains. IR/neurosery consulted, no biopsy or surgical interventions. ID consulted, vancomycin initiated empirically.    Allergies:     Tape     Pertinent cultures to date:     Results       Procedure Component Value Units Date/Time    BLOOD CULTURE [[id number]]  (Abnormal) Collected: 01/27/25 1859    Order Status: Completed Specimen: Blood from Peripheral Updated: 01/28/25 1214     Significant Indicator POS     Source BLD     Site PERIPHERAL     Culture Result Growth detected by automated blood culture system.  01/28/2025  12:13  Gram Stain: Gram positive cocci in pairs/chains.      BLOOD CULTURE [[id number]] Collected: 01/28/25 0241    Order Status: Completed Specimen: Blood from Peripheral Updated: 01/28/25 0608     Significant Indicator NEG     Source BLD     Site PERIPHERAL      "Culture Result No Growth  Note: Blood cultures are incubated for 5 days and  are monitored continuously.Positive blood cultures  are called to the RN and reported as soon as  they are identified.              Labs:     Estimated Creatinine Clearance: 13 mL/min (A) (by C-G formula based on SCr of 3.8 mg/dL (H)).  Recent Labs     25  0621   WBC 6.9     Recent Labs     25  0621 25  0032 25  0241   BUN 74* 75* 84*   CREATININE 3.61* 3.88* 3.80*       Intake/Output Summary (Last 24 hours) at 2025 1705  Last data filed at 2025 1200  Gross per 24 hour   Intake 740 ml   Output 1000 ml   Net -260 ml      /66   Pulse (!) 54   Temp 36.8 °C (98.3 °F) (Temporal)   Resp 18   Ht 1.676 m (5' 6\")   Wt 57.4 kg (126 lb 8.7 oz)   SpO2 98%  Temp (24hrs), Av.5 °C (97.7 °F), Min:36.1 °C (96.9 °F), Max:36.9 °C (98.4 °F)      List concerns for Vancomycin clearance:     ESRD    A/P:     -  Vancomycin dose: IV 1500 mg x1     -  Next vancomycin level(s): in 3-5 days    -  Comments: Patient is on hemodialysis on  and , pharmacy will order random level and dose after dialysis if appropriate.     Shelbi Araiza, PharmD, BCPS    "

## 2025-01-29 NOTE — ASSESSMENT & PLAN NOTE
MRI showed C7-T1 findings concerning for discitis osteomyelitis  Infectious disease consulted  -Vancomycin  -repeat C spine mri pending  Follow-up blood culture result  Neurosurgery recommended conservative management with IV antibiotics, no surgical intervention  Discussed with IR, unable to perform cervical spine bone biopsy

## 2025-01-29 NOTE — PROGRESS NOTES
Infectious Disease Progress Note    Author: Payton Han M.D. Date & Time of service: 2025  1:24 PM    Chief Complaint:  vertebral osteo  Efaecium bacteremia    Interval History:  69 y.o. diabetic female originally  admitted 2025 worsening back pain and arm pain   AF GPC E faecium Plan of care reviewed with patient  Labs Reviewed and Medications Reviewed.    Review of Systems:  Review of Systems   Constitutional:  Negative for fever.   Musculoskeletal:  Positive for neck pain.       Hemodynamics:  Temp (24hrs), Av.5 °C (97.7 °F), Min:36.2 °C (97.2 °F), Max:36.8 °C (98.3 °F)  Temperature: 36.2 °C (97.2 °F)  Pulse  Av.9  Min: 54  Max: 90   Blood Pressure : 111/58       Physical Exam:  Physical Exam  Vitals and nursing note reviewed.   Constitutional:       General: She is not in acute distress.     Appearance: She is not ill-appearing, toxic-appearing or diaphoretic.   Eyes:      General: No scleral icterus.     Extraocular Movements: Extraocular movements intact.      Pupils: Pupils are equal, round, and reactive to light.   Cardiovascular:      Rate and Rhythm: Normal rate.   Pulmonary:      Effort: Pulmonary effort is normal. No respiratory distress.   Abdominal:      General: There is no distension.   Musculoskeletal:      Cervical back: Neck supple.      Comments: Cord LUE   Skin:     Coloration: Skin is not jaundiced.   Neurological:      General: No focal deficit present.      Mental Status: She is alert and oriented to person, place, and time.         Meds:    Current Facility-Administered Medications:     [START ON 2025] DAPTOmycin    DAPTOmycin    heparin    heparin    gabapentin    acetaminophen    acetaminophen    torsemide    NS    methocarbamol    petrolatum    insulin GLARGINE    metoprolol SR    amLODIPine    DULoxetine    levothyroxine    traZODone    venlafaxine XR    heparin    senna-docusate **AND** polyethylene glycol/lytes    insulin lispro **AND** POC blood  "glucose manual result **AND** NOTIFY MD and PharmD **AND** Administer 20 grams of glucose (approximately 8 ounces of fruit juice) every 15 minutes PRN FSBG less than 70 mg/dL **AND** dextrose bolus    Pharmacy Consult Request    oxyCODONE immediate-release **OR** oxyCODONE immediate-release **OR** HYDROmorphone    Labs:  No results for input(s): \"WBC\", \"RBC\", \"HEMOGLOBIN\", \"HEMATOCRIT\", \"MCV\", \"MCH\", \"RDW\", \"PLATELETCT\", \"MPV\", \"NEUTSPOLYS\", \"LYMPHOCYTES\", \"MONOCYTES\", \"EOSINOPHILS\", \"BASOPHILS\", \"RBCMORPHOLO\" in the last 72 hours.  Recent Labs     01/27/25  0032 01/28/25  0241   SODIUM 136 136   POTASSIUM 5.4 5.0   CHLORIDE 107 106   CO2 16* 18*   GLUCOSE 135* 97   BUN 75* 84*     Recent Labs     01/27/25 0032 01/28/25  0241   CREATININE 3.88* 3.80*       Imaging:  MR-CERVICAL SPINE-WITH & W/O    Result Date: 1/27/2025 1/27/2025 4:19 PM HISTORY/REASON FOR EXAM:  ARM PAIN; she is a dialysis patient and will get dialysis after the scan. TECHNIQUE/EXAM DESCRIPTION: MRI of the cervical spine without and with contrast. The study was performed on a [employer] Signa 1.5 Twyla MRI scanner. T1 sagittal, T2 fast spin-echo sagittal, T1 postcontrast fat-suppressed sagittal, and gradient-echo axial images were obtained of the cervical spine. 12 mL ProHance contrast were administered  intravenously. COMPARISON:  CT dated 1/10/2025 FINDINGS:  Postoperative changes are noted with cervical fusion across C4-C7. Minimal prevertebral soft tissue edema is noted. Increased signal is noted on STIR imaging within the adjacent endplates at the C6-C7 level. Motion artifact limits evaluation. Spinal cord signal is grossly within normal limits though subtle abnormality cannot be identified due to motion artifact. Included portion of the posterior fossa is normal. Findings specific to each level are described below: C2-3: Endplate disc osteophyte complex is present causing mild canal narrowing. There is probably mild left foraminal narrowing. " C3-4:  Postoperative changes noted with a well decompressed spinal canal. Neuroforamina are normal. C4-5:  Spinal canal is well decompressed. There is no significant canal narrowing. C5-6: Spinal canal is well decompressed. C6-7: Endplate discussed by complex at C6-C7 results in moderate canal narrowing with slight cord deformity. There is also moderate bilateral foraminal narrowing at this level. C7-T1: No significant abnormality. Mild enhancement of the intervertebral disc space at C6-C7 and minimal enhancement anteriorly within the prevertebral soft tissues. Linear enhancement also noted extending inferiorly along the right paracentral aspect in the prevertebral region at the C7-T1 level. No definite abnormal epidural enhancement is seen.     1.  Postoperative changes in the cervical spine as described above. 2.  Abnormal signal is noted in the prevertebral soft tissues extending from the mid C2 to the C7-T1 level. Abnormal marrow signal is noted at the C7-T1 level with mild enhancement of the intervertebral disc space at this level. These findings are concerning for discitis-osteomyelitis. 3.  There is moderate spinal canal stenosis at C6-7.     US-RUQ    Result Date: 1/22/2025 1/22/2025 11:02 AM HISTORY/REASON FOR EXAM: Right upper quadrant abdominal pain. TECHNIQUE/EXAM DESCRIPTION: Ultrasound of the gallbladder, liver and biliary tree. COMPARISON: None FINDINGS: The liver echogenicity is heterogeneous. There is no evidence of hepatic mass. The gallbladder is surgically absent. The common bile duct is measured at 12 mm. There is some intrahepatic biliary ductal dilatation. The visualized portions of the portal vein and inferior vena cava are normal. The pancreas is normal. The right kidney is normal.     1.  Prior cholecystectomy. 2.  Common bile duct diameter measured at 12 mm with some intrahepatic biliary ductal dilatation. This may be related to presence of prior cholecystectomy. 3.  The liver is  heterogeneous consistent with fatty change versus hepatocellular dysfunction.    DX-CHEST-PORTABLE (1 VIEW)    Result Date: 1/22/2025 1/22/2025 5:43 AM HISTORY/REASON FOR EXAM: Chest Pain TECHNIQUE/EXAM DESCRIPTION:  Single AP view of the chest. COMPARISON: January 6, 2025 FINDINGS: Position of medical devices appears stable. The cardiac silhouette appears within normal limits. Atherosclerotic calcification of the aorta is noted.  The central pulmonary vasculature appears normal. Asymmetric elevation of the right hemidiaphragm is noted.  Bilateral lungs are clear. No significant pleural effusions are identified. The bony structures appear age-appropriate.     1.  No acute cardiopulmonary disease. 2.  Atherosclerosis    CT-TSPINE W/O PLUS RECONS    Result Date: 1/10/2025  1/10/2025 9:54 AM HISTORY/REASON FOR EXAM:  SYNCOPE; BACK PAIN; ARM PAIN. TECHNIQUE/EXAM DESCRIPTION AND NUMBER OF VIEWS: CT thoracic spine without contrast, with reconstructions. The study was performed on a [employer] CT scanner. Thin-section helical scanning was performed from C7-T1 through T12-L1. Sagittal and coronal multiplanar reconstructions were generated from the axial images. Low dose optimization technique was utilized for this CT exam including automated exposure control and adjustment of the mA and/or kV according to patient size. COMPARISON: None. FINDINGS: Nomenclature: C7-T1 is axial image 26 of 254. Alignment: Moderate leftward curvature centered at T9-10. Minimal anterolisthesis of C7 on T1. Vertebrae: No acute fracture. No aggressive osseous lesions. Degenerative loss of intervertebral disc space at several levels throughout the mid thoracic spine. Endplate sclerosis at T9-10 and is unchanged. Spondylosis: No high-grade canal or foraminal stenosis. Soft tissues: Trace bilateral pleural effusions.     No acute fracture or traumatic malalignment.    CT-CSPINE WITHOUT PLUS RECONS    Result Date: 1/10/2025  1/10/2025 9:54 AM  HISTORY/REASON FOR EXAM: SYNCOPE; BACK PAIN; ARM PAIN TECHNIQUE/EXAM DESCRIPTION: CT cervical spine without contrast, with reconstructions. The study was performed on a helical multidetector CT scanner. Thin-section helical scanning was performed from the skull base through T1. Sagittal and coronal multiplanar reconstructions were generated from the axial images. Low dose optimization technique was utilized for this CT exam including automated exposure control and adjustment of the mA and/or kV according to patient size. COMPARISON:  CT 3/10/2019, MRI 1/8/2025. FINDINGS: Alignment: Grade 1 anterolisthesis of C2 on C3 and C7 on T1. Minimal retrolisthesis of C3 on C4. C1 and C2 lateral masses are congruent. Vertebrae: Prior interbody and posterior fusion of C4-C7. Hardware creates streak artifact limiting evaluation of vertebral bodies and posterior elements at the surgical levels. No evidence of lucency surrounding the hardware. There is sclerosis of the C7-T1 endplates as before, with increased destruction of the intervening disc space. No acute fracture. No aggressive osseous lesion. Spondylosis: No high-grade osseous canal or foraminal stenosis. Soft tissues: No prevertebral soft tissue swelling. Visualized lung apices are clear. Other: Visualized intracranial contents are unremarkable.     Prior interbody and posterior fusion of C4-C7. Sclerosis of the C7-T1 endplates as before, with increased destruction of the intervening disc space since 2019. Please correlate clinically for discitis-osteomyelitis.    CT-HEAD W/O    Result Date: 1/10/2025  1/10/2025 9:54 AM HISTORY/REASON FOR EXAM: R42  Dizziness TECHNIQUE/EXAM DESCRIPTION AND NUMBER OF VIEWS: CT of the head without contrast. The study was performed on a helical multidetector CT scanner. Contiguous 2.5 mm axial sections were obtained from the skull base through the vertex. Up to date radiation dose reduction adjustments have been utilized to meet ALARA  standards for radiation dose reduction. COMPARISON:  4/1/2024 FINDINGS: There is no evidence of acute intracranial hemorrhage, mass, mass-effect or shift of midline structures. Gray-white matter differentiation is grossly preserved. Ventricle size and brain parenchymal volume are appropriate for this patient's stated age. The basal cisterns are patent. There are no abnormal extra-axial fluid collections. No depressed or widely  calvarial fracture is seen. The visualized paranasal sinuses and temporal bone structures are aerated. ___________________________________     1. No acute intracranial abnormality. No evidence of acute intracranial hemorrhage or mass lesion.     MR-THORACIC SPINE-WITH & W/O    Result Date: 1/8/2025 1/8/2025 8:02 AM HISTORY/REASON FOR EXAM:  BACK PAIN BACK PAIN - Pt states for a long time she has had pain between her scapula, has been seen for it. States pain is back and worse tonight. TECHNIQUE/EXAM DESCRIPTION: MRI of the thoracic spine without and with contrast. The study was performed on a [employer] Signa 1.5 Twyla MRI scanner. T1 sagittal, T2 fast spin-echo sagittal, and T2 axial images were obtained of the thoracic spine. T1 post-contrast fat suppressed sagittal images were obtained. Optional T1 post-contrast axial images may be obtained. 10 mL ProHance contrast was administered intravenously. COMPARISON:  CT thoracic spine 12/22/2024 FINDINGS: Compensatory levocurvature of the thoracic spine noted with a rightward curvature of the lumbar spine. Spinal cord signal intensity is within normal limits. The conus is identified at the T12-L1 level. Type II marrow changes are noted in the adjacent T9 and T10 endplates. At T9-10, endplate degenerative changes noted with a posterior disc osteophyte complex mildly encroaching upon the spinal canal. There is also bilateral advanced facet degeneration at this level. These degenerative changes result in mild canal narrowing at this level. The  remaining thoracic levels do not demonstrate any evidence of significant canal or foraminal stenosis. Postcontrast images through the thoracic spine do not demonstrate any abnormal enhancement.     Mild degenerative changes at T9-10. Otherwise, no significant abnormality is identified in the thoracic spine.    MR-CERVICAL SPINE-WITH & W/O    Result Date: 1/8/2025 1/8/2025 8:02 AM HISTORY/REASON FOR EXAM:  BACK PAIN TECHNIQUE/EXAM DESCRIPTION: MRI of the cervical spine without and with contrast. The study was performed on a [employer] Signa 1.5 Twlya MRI scanner. T1 sagittal, T2 fast spin-echo sagittal, T1 postcontrast fat-suppressed sagittal, and gradient-echo axial images were obtained of the cervical spine. 10 mL ProHance contrast were administered  intravenously. COMPARISON:  3/10/2019, MRI dated 9/1/2016. FINDINGS:  C4-C7 posterior lateral mass fusion noted. Abnormal marrow signal is noted involving the adjacent endplates at C6-C7 with increased signal on STIR imaging and low signal on T1 imaging, more marked in C7. Motion artifact significantly limits evaluation. Included portion of the posterior fossa is normal. Spinal cord signal is grossly normal. Findings specific to each level are described below: . C2-3: Normal C3-4:  Endplate disc osteophyte complex with bilateral uncovertebral degeneration. There is asymmetric mild left-sided canal narrowing due to left predominant uncovertebral degeneration. There is moderate left foraminal narrowing. C4-5: Endplate disc osteophyte complex with bilateral uncovertebral degeneration but no significant canal or foraminal stenosis. C5-6: Postoperative changes noted at this level. There is no significant canal stenosis. Neural foramina are difficult to assess due to artifact from the lateral mass screws. C6-7: Endplate disc osteophyte complex moderately encroaches upon the spinal canal causing moderate canal narrowing. There is bilateral uncovertebral degeneration with moderate  right foraminal narrowing. C7-T1: Minimal endplate disc osteophyte complex without significant encroachment upon the spinal canal or neural foramina. T1-2: Normal. No definite abnormal epidural enhancement is identified in cervical spine. There is mild enhancement of the bone marrow of the adjacent endplates at C6-C7 T2-weighted images demonstrate minimal high signal in the prevertebral space at the C6-C7 level. However, there is no definite associated abnormal enhancement in this location. Prevertebral soft tissues are within normal limits.     1.  Postoperative changes noted in the cervical spine with posterior lateral mass fusion between C4 and C7. 2.  Abnormal bone marrow signal abnormal enhancement identified at the C6-C7 level involving the adjacent endplates and the entirety of the C7 vertebral body. Slightly increased T2 signal is also noted within the disc space at this level with minimal enhancement. However, this area did demonstrate a sclerotic appearance on previous CT. Minimal abnormal signal is present also within the prevertebral soft tissues at this level. A new infectious process cannot be completely excluded. Recommend correlation with history, physical exam and consider short-term follow-up imaging. 3.  Moderate canal stenosis is noted at C6-C7.    DX-CHEST-PORTABLE (1 VIEW)    Result Date: 1/6/2025 1/6/2025 12:55 PM HISTORY/REASON FOR EXAM:  Chest Pain. TECHNIQUE/EXAM DESCRIPTION AND NUMBER OF VIEWS: Single portable view of the chest. COMPARISON: 12/31/2024 FINDINGS: Stable right IJ central venous catheter. Cardiomediastinal silhouette is stable. Aortic calcified atherosclerotic plaque. Coronary artery stent. No focal consolidation, pleural effusion, pulmonary edema or pneumothorax. No acute osseous abnormality.     No acute cardiopulmonary abnormality.    DX-CHEST-PORTABLE (1 VIEW)    Result Date: 12/31/2024 12/31/2024 3:32 AM HISTORY/REASON FOR EXAM:  Chest Pain. TECHNIQUE/EXAM DESCRIPTION  AND NUMBER OF VIEWS: Single portable view of the chest. COMPARISON: 12/21/2024 FINDINGS: Cardiac silhouette is normal in size. No airspace infiltrate, pleural effusion, or pneumothorax. Dialysis catheter tip overlies the right ventricle. Scoliosis.     No acute process.      Micro:  Results       Procedure Component Value Units Date/Time    BLOOD CULTURE [[id number]]  (Abnormal) Collected: 01/27/25 1859    Order Status: Completed Specimen: Blood from Peripheral Updated: 01/29/25 0804     Significant Indicator POS     Source BLD     Site PERIPHERAL     Culture Result Growth detected by automated blood culture system.  01/28/2025  12:13  Gram Stain: Gram positive cocci in pairs/chains.  Unable to identify by [facility], additional studies  pending.        Enterococcus faecium    BLOOD CULTURE [[id number]] Collected: 01/28/25 0241    Order Status: Completed Specimen: Blood from Peripheral Updated: 01/28/25 0608     Significant Indicator NEG     Source BLD     Site PERIPHERAL     Culture Result No Growth  Note: Blood cultures are incubated for 5 days and  are monitored continuously.Positive blood cultures  are called to the RN and reported as soon as  they are identified.              Assessment:  Active Hospital Problems    Diagnosis     *Radiculopathy affecting upper extremity [M54.10]     Bacteremia [R78.81]     Acute osteomyelitis of cervical spine ([facility]) [M46.22]     CKD (chronic kidney disease) stage 4, GFR 15-29 ml/min ([facility]) [N18.4]     DNR (do not resuscitate) [Z66]     Type 2 diabetes mellitus, with long-term current use of insulin ([facility]) [E11.9, Z79.4]     Intractable back pain [M54.9]     ESRD needing dialysis ([facility]) [N18.6, Z99.2]     CAD S/P percutaneous coronary angioplasty [I25.10, Z98.61]     Primary hypertension [I10]      Abnormal MRI C spine  Suspected vertebral osteomyelitis-IR and Neurosurgery declined biopsy  Blood cult Efaecium           PLAN:   Repeat Bcxs every 48 hours until neg  TTE; RAFIQ if  persistently positive blood cultures  Transition vancomycin to dapto pending susceptibilities  Anticipate 6+ weeks IV abx from date of negative blood cxs     PICC -no Antibiotics will be dosed after dialysis  Plan of care discussed with Renal Dr Freed and Pharm.. Will continue to follow     Payton Han M.D.

## 2025-01-29 NOTE — PROGRESS NOTES
"Pt reports excruciating pain in left arm that radiates to forearm and hand, pt is grimacing and crying. Pt radial pulse intact and able to grasp my fingers, however, this increased pain. Pt reports baseline numbness and tingling in left arm, but it is worse. Pt reports pain feels deep, sharp, stabbing. Pt states \"it feels like it's in my bone\". No swelling or redness to affected area. Oxycodone last given at 1334, too soon for Dilaudid. Pt is also requesting Ativan. Notified MD  "

## 2025-01-29 NOTE — THERAPY
"Physical Therapy   Initial Evaluation     Patient Name: Anu Manuel  Age:  69 y.o., Sex:  female  Medical Record #: [id number]  Today's Date: 1/29/2025          Assessment  Patient is 69 y.o. female w/ hx of DM, ESRD/HD, CAD, chronic back pain.  Admitted w/ BUE pain for 4 weeks.  Per MD note 1/28/2025, neuro c/s reports Abx, no bx or cultures at this time.  Pt lives alone in a single story house w/ 2 steps to enter.  She was mobile w/ a walker.  Today, she is rec'd alert, in bed, pleasant and eager to work w/ PT.  She is able to mobilize as noted below, including moving up/down 2 steps and ambulating 200 ft w/ a fww.  After which, pt c/o feeling like she was going to \"pass out\".  She was assisted to a chair and returned to bed.  BP after sitting was 97/61, after returning to supine, BP was11/58.  Anticipate that once BP issues are mitigated, pt should be able to return home.  PT for eval only.  Plan    Physical Therapy Initial Treatment Plan   Duration: Evaluation only    DC Equipment Recommendations: None  Discharge Recommendations: Recommend home health for continued physical therapy services         Objective       01/29/25 1445   Prior Living Situation   Housing / Facility 1 Story House   Steps Into Home 2   Steps In Home 0   Equipment Owned Front-Wheel Walker;4-Wheel Walker;Single Point Cane   Lives with - Patient's Self Care Capacity Alone and Able to Care For Self   Prior Level of Functional Mobility   Bed Mobility Independent   Transfer Status Independent   Ambulation Independent   Assistive Devices Used 4-Wheel Walker   Stairs Independent   Strength Lower Body   Comments does not limit fxl mobility   Balance Assessment   Sitting Balance (Static) Fair +   Sitting Balance (Dynamic) Fair +   Standing Balance (Static) Fair   Standing Balance (Dynamic) Fair   Weight Shift Sitting Good   Weight Shift Standing Good   Comments w/ fww   Bed Mobility    Supine to Sit Supervised   Sit to Supine Supervised "   Gait Analysis   Gait Level Of Assist Supervised   Assistive Device Front Wheel Walker   Distance (Feet) 200   # of Stairs Climbed 2   Level of Assist with Stairs Supervised   Functional Mobility   Sit to Stand Supervised   Bed, Chair, Wheelchair Transfer Supervised   Physical Therapy Initial Treatment Plan    Duration Evaluation only   Anticipated Discharge Equipment and Recommendations   DC Equipment Recommendations None   Discharge Recommendations Recommend home health for continued physical therapy services

## 2025-01-29 NOTE — PROGRESS NOTES
Assumed patient care and received bedside report from Day RN. Patient is A&Ox4 and reports 7/10 pain of the back, medicated per MAR. Non-pharmacological interventions encouraged like heat packs. Patient on RA, awake, and alert.     Patient educated on the use of the call light and verbalized understanding. Bed in the lowest and locked position with bed alarm on. Personal belongings and call light within reach. Moderate Fall Risk precautions and hourly rounding in place. Safety education provided. POC discussed and patient declines any further needs at this time. Orders carried out.

## 2025-01-29 NOTE — DISCHARGE PLANNING
"HTH/SCP TCN chart review completed. Current discharge considerations are HH vs. SNF. Per kardex patient ambulated 1x5 feet with HHA.  Per TCN note on 1/2, \"Note that pt reports she has a 4WW and FWW if needed at time of dc.\" In addition, per ID note, \"Anticipate 6 weeks IV abx from date of negative blood cxs.\"  Noted PT/OT orders in chart.     TCN will continue to follow and collaborate with discharge planning team as additional post acute needs arise. Thank you.    Completed:  Choice obtained:  (1. [facility] 2. [facility])  Pt aware of [facility] blanket referral policy, no referrals sent at this time.   Noted [facility] hosp[facility] has declined patient.   SCP with Non-[facility] PCP.   "

## 2025-01-29 NOTE — TELEPHONE ENCOUNTER
Called pt and left vm to reschedule her missed appt due to being admitted to hospital. Let pt know we can reschedule appt at her convenience

## 2025-01-29 NOTE — ASSESSMENT & PLAN NOTE
1/2 blood cultures from admission Enterococcus  Infectious disease consulted  -Changed to vancomycin  -Echocardiogram showed endocarditis  -Repeat blood cultures per ID

## 2025-01-29 NOTE — ASSESSMENT & PLAN NOTE
Also complaining of severe burning neuropathic pain in her arm  Increase gabapentin 300 mg daily, continue duloxetine  Start scheduled Tylenol 500 mg every 6 hours  Continue oral and IV opiates as needed

## 2025-01-29 NOTE — PROGRESS NOTES
Hospital Medicine Daily Progress Note    Date of Service  1/29/2025    Chief Complaint  Anu Manuel is a 69 y.o. female admitted 1/22/2025 with neck pain     Hospital Course  Anu Manuel is a 69 y.o. female with past medical history of insulin-dependent diabetes mellitus, ESRD on dialysis, CAD, recent admission for acute on chronic back pain who presented 1/22/2025 with bilateral arm pain.  MRI of the cervical spine from 1/8/2025 revealed abnormal bone marrow signal with raising the possibility of infection and short-term follow-up imaging was recommended. Nephrology consulted for scheduled dialysis.  MRI cervical spine showed C7-T1 findings concerning for discitis/osteomyelitis with moderate spinal canal stenosis at C6-7.  Neurosurgery was consulted recommended IR bone biopsy and conservative management.  Did not recommend surgery at this time.  Discussed with IR however they do not perform cervical bone biopsies.  Patient found to be bacteremic and infectious disease was consulted started on IV antibiotics.    Interval Problem Update  1/28 vital stable on room air, afebrile  Creatinine 3.80, GFR 12  Blood cultures 1/2 Gram stain with gram-positive cocci in pairs  Notified by nursing patient with severe hypoglycemia this morning blood glucose 37, given IV dextrose, repeat 112  Discussed with interventional radiology, they do not perform cervical bone biopsies  Discussed with neurosurgery recommending conservative nonoperative treatment for now recommended IV antibiotics  Discussed with infectious disease started on vancomycin, recommended an echocardiogram  Echo pending  Patient very tearful and anxious at time of my evaluation.  She reports the  oral oxycodone takes her pain from 10 to a 6 but she has been having burning pains in her left arm.  After discussion with pharmacy will increase gabapentin to 300 mg which is max for her ESRD.  Start scheduled Tylenol.  Continues to require IV  Dilaudid for breakthrough pain, continue to monitor respiratory status closely    1/29 vital stable on room air  1/2 blood culture with Enterococcus faecium   -Repeat blood cultures ordered for tomorrow  Echocardiogram showed EF 70%, normal diastolic function echodensity on the mitral valve and tricuspid valve suggested of valvular vegetation.  Discussed with infectious disease he requested a RAFIQ, changed antibiotic to daptomycin  Discussed with cardiology, plan for RAFIQ tomorrow n.p.o. at midnight  Glucose 300 this morning, it appears per MAR glargine was held yesterday evening  Patient continues to complain of severe pain in her left arm mostly that she reports is still the burning pain but also feels deep in her left upper bicep.  Venous ultrasound was negative for DVT.  Increase oral oxycodone dosing and gave a one-time dose extra Dilaudid.  Thus far respiratory status and blood pressure stable continue to monitor.    I have discussed this patient's plan of care and discharge plan at IDT rounds today with Case Management, Nursing, Nursing leadership, and other members of the IDT team.    Consultants/Specialty  nephrology  Infectious disease  Cardiology    Code Status  DNAR/DNI    Disposition  The patient is not medically cleared for discharge to home or a post-acute facility.  Anticipate discharge to: home with close outpatient follow-up    I have placed the appropriate orders for post-discharge needs.    Review of Systems  Review of Systems   Constitutional:  Positive for malaise/fatigue. Negative for chills and fever.   Respiratory:  Negative for shortness of breath.    Cardiovascular:  Negative for chest pain.   Gastrointestinal:  Negative for abdominal pain and nausea.   Musculoskeletal:  Positive for joint pain and myalgias.   Neurological:  Positive for weakness.   Psychiatric/Behavioral:  The patient is nervous/anxious.         Physical Exam  Temp:  [35.9 °C (96.7 °F)-36.6 °C (97.8 °F)] 35.9 °C (96.7  °F)  Pulse:  [68-81] 81  Resp:  [14-20] 18  BP: ()/(58-75) 115/59  SpO2:  [90 %-97 %] 90 %    Physical Exam  Constitutional:       General: She is not in acute distress.     Appearance: Normal appearance. She is ill-appearing.   HENT:      Mouth/Throat:      Pharynx: Oropharynx is clear.   Eyes:      Conjunctiva/sclera: Conjunctivae normal.   Cardiovascular:      Rate and Rhythm: Normal rate and regular rhythm.      Pulses: Normal pulses.      Comments: Palpable radial pulses bilaterally  Pulmonary:      Effort: Pulmonary effort is normal. No respiratory distress.      Breath sounds: No wheezing.   Abdominal:      General: There is distension.      Palpations: Abdomen is soft.      Tenderness: There is no abdominal tenderness.   Musculoskeletal:         General: Tenderness (Left arm) present.      Right lower leg: No edema.      Left lower leg: No edema.   Skin:     General: Skin is warm.   Neurological:      General: No focal deficit present.      Mental Status: She is alert and oriented to person, place, and time.      Comments: Overall motor strength 5 out of 5 all extremity, does have weak left hand , sensory intact   Psychiatric:         Mood and Affect: Mood is anxious. Affect is tearful.         Fluids    Intake/Output Summary (Last 24 hours) at 1/29/2025 1718  Last data filed at 1/29/2025 1412  Gross per 24 hour   Intake 640 ml   Output --   Net 640 ml          Laboratory        Recent Labs     01/27/25  0032 01/28/25  0241   SODIUM 136 136   POTASSIUM 5.4 5.0   CHLORIDE 107 106   CO2 16* 18*   GLUCOSE 135* 97   BUN 75* 84*   CREATININE 3.88* 3.80*   CALCIUM 7.9* 8.0*                   Imaging  US-EXTREMITY VENOUS UPPER UNILAT LEFT   Final Result      EC-ECHOCARDIOGRAM COMPLETE W/O CONT   Final Result      MR-CERVICAL SPINE-WITH & W/O   Final Result         1.  Postoperative changes in the cervical spine as described above.      2.  Abnormal signal is noted in the prevertebral soft tissues  extending from the mid C2 to the C7-T1 level. Abnormal marrow signal is noted at the C7-T1 level with mild enhancement of the intervertebral disc space at this level. These findings are    concerning for discitis-osteomyelitis.      3.  There is moderate spinal canal stenosis at C6-7.         US-RUQ   Final Result      1.  Prior cholecystectomy.      2.  Common bile duct diameter measured at 12 mm with some intrahepatic biliary ductal dilatation. This may be related to presence of prior cholecystectomy.      3.  The liver is heterogeneous consistent with fatty change versus hepatocellular dysfunction.      DX-CHEST-PORTABLE (1 VIEW)   Final Result         1.  No acute cardiopulmonary disease.   2.  Atherosclerosis      EC-RAFIQ W/O CONT    (Results Pending)        Assessment/Plan  * Radiculopathy affecting upper extremity- (present on admission)  Assessment & Plan  Her pain has been debilitating despite Neurontin and Cymbalta.  She had the abnormal MRI noted below.  Because of this the MRI will be repeated as she may benefit from spine surgery consultation for either inpatient or outpatient management based on the results.  .  She will be given oral narcotics and no further IV narcotics at this time in order to start the transition of discharge home eventually on oral   Continue on Cymbalta, gabapentin  MRI C-spine showed osteomyelitis/discitis  Requiring frequent IV narcotics, close monitoring for toxicity while on IV controlled substance.   Neurosurgery recommending conservative management, no surgical intervention at this time  Infectious disease consulted    Bacteremia- (present on admission)  Assessment & Plan  1/2 blood cultures from admission Enterococcus  Infectious disease consulted  -Changed to vancomycin  -Echocardiogram showed endocarditis  -Repeat blood cultures per ID    Acute bacterial endocarditis- (present on admission)  Assessment & Plan  Echocardiogram shows likely tricuspid and mitral valve  endocarditis  Infectious disease following  Cardiology consulted, plan for RAFIQ 1/30  -N.p.o. at midnight    Acute osteomyelitis of cervical spine ([facility])- (present on admission)  Assessment & Plan  MRI showed C7-T1 findings concerning for discitis osteomyelitis  Infectious disease consulted  -Vancomycin  Follow-up blood culture result  Neurosurgery recommended conservative management with IV antibiotics, no surgical intervention  Discussed with IR, unable to perform cervical spine bone biopsy    CKD (chronic kidney disease) stage 4, GFR 15-29 ml/min ([facility])- (present on admission)  Assessment & Plan  Continue on torsemide 100 mg daily  Repeat BMP in a.m. to monitor renal function  Nephrology following for dialysis needs      Intractable back pain- (present on admission)  Assessment & Plan  Also complaining of severe burning neuropathic pain in her arm  Increase gabapentin 300 mg daily, continue duloxetine  Start scheduled Tylenol 500 mg every 6 hours  Continue oral and IV opiates as needed    Type 2 diabetes mellitus, with long-term current use of insulin ([facility])- (present on admission)  Assessment & Plan  On sliding scale and glargine  Monitor of for hypoglycemic episode    DNR (do not resuscitate)- (present on admission)  Assessment & Plan  Per her wishes  She has been followed by palliative care in the past        ESRD needing dialysis ([facility])- (present on admission)  Assessment & Plan  She has a left arm fistula  Nephrology will be consulted for dialysis    CAD S/P percutaneous coronary angioplasty- (present on admission)  Assessment & Plan  History of    Primary hypertension- (present on admission)  Assessment & Plan  Continue Norvasc and Toprol with holding parameters         VTE prophylaxis: heparin SC    I have performed a physical exam and reviewed and updated ROS and Plan today (1/29/2025). In review of yesterday's note (1/28/2025), there are no changes except as documented above.

## 2025-01-29 NOTE — PROGRESS NOTES
Report received from NOC RN and assumed patient care at 0700. Patient is A&Ox 4, on RA, and bed alarm is on . Patient reporting a pain level of 10/10 in her neck. Medicated per MAR. Pt reports she did have a little dizziness when she went to  this AM. She states she thinks it from laying in bed so much. Pt denies any other symptoms or needs at this time. Call light within reach and bed in lowest position. Reinforced the need to call for assistance. Plan of care discussed and patient does not have any further needs at this time.

## 2025-01-29 NOTE — CARE PLAN
The patient is Stable - Low risk of patient condition declining or worsening    Shift Goals  Clinical Goals: Patient will reporta tolerable pain level, continue IV ABX  Patient Goals: Pain control, safety  Family Goals: RILEY    Progress made toward(s) clinical / shift goals:  Patient educated on the pain rating scale and medications available for interventions. Q4H pain assessments and Q1H pain medication assessments in place. Patient medicated per MAR when pain was reported throughout shift. Non-pharmacological interventions encouraged like repositioning and heat packs. Vancomycin is the choice ABX for this patient. Labs reviewed. Most current WBC was 6.9, trending down from 9.     Problem: Knowledge Deficit - Standard  Goal: Patient and family/care givers will demonstrate understanding of plan of care, disease process/condition, diagnostic tests and medications  1/29/2025 0749 by Nikos Trevino R.N.  Outcome: Progressing     Problem: Pain - Standard  Goal: Alleviation of pain or a reduction in pain to the patient’s comfort goal  Description: Target End Date:  Prior to discharge or change in level of care    Document on Vitals flowsheet    1.  Document pain using the appropriate pain scale per order or unit policy  2.  Educate and implement non-pharmacologic comfort measures (i.e. relaxation, distraction, massage, cold/heat therapy, etc.)  3.  Pain management medications as ordered  4.  Reassess pain after pain med administration per policy  5.  If opiods administered assess patient's response to pain medication is appropriate per POSS sedation scale  6.  Follow pain management plan developed in collaboration with patient and interdisciplinary team (including palliative care or pain specialists if applicable)  Outcome: Progressing     Problem: Vancomycin  Goal: Labs Reviewed  Outcome: Progressing       Patient is not progressing towards the following goals:

## 2025-01-30 ENCOUNTER — APPOINTMENT (OUTPATIENT)
Dept: CARDIOLOGY | Facility: MEDICAL CENTER | Age: 70
End: 2025-01-30
Attending: INTERNAL MEDICINE
Payer: MEDICARE

## 2025-01-30 ENCOUNTER — ANESTHESIA (OUTPATIENT)
Dept: CARDIOLOGY | Facility: MEDICAL CENTER | Age: 70
End: 2025-01-30
Payer: MEDICARE

## 2025-01-30 ENCOUNTER — ANESTHESIA EVENT (OUTPATIENT)
Dept: CARDIOLOGY | Facility: MEDICAL CENTER | Age: 70
End: 2025-01-30
Payer: MEDICARE

## 2025-01-30 PROBLEM — I51.89 RIGHT ATRIAL MASS: Status: ACTIVE | Noted: 2025-01-29

## 2025-01-30 LAB
ANION GAP SERPL CALC-SCNC: 12 MMOL/L (ref 7–16)
BUN SERPL-MCNC: 55 MG/DL (ref 8–22)
CALCIUM SERPL-MCNC: 8.1 MG/DL (ref 8.5–10.5)
CHLORIDE SERPL-SCNC: 102 MMOL/L (ref 96–112)
CO2 SERPL-SCNC: 24 MMOL/L (ref 20–33)
CREAT SERPL-MCNC: 3.39 MG/DL (ref 0.5–1.4)
GFR SERPLBLD CREATININE-BSD FMLA CKD-EPI: 14 ML/MIN/1.73 M 2
GLUCOSE BLD STRIP.AUTO-MCNC: 128 MG/DL (ref 65–99)
GLUCOSE BLD STRIP.AUTO-MCNC: 130 MG/DL (ref 65–99)
GLUCOSE BLD STRIP.AUTO-MCNC: 136 MG/DL (ref 65–99)
GLUCOSE BLD STRIP.AUTO-MCNC: 138 MG/DL (ref 65–99)
GLUCOSE BLD STRIP.AUTO-MCNC: 289 MG/DL (ref 65–99)
GLUCOSE BLD STRIP.AUTO-MCNC: 40 MG/DL (ref 65–99)
GLUCOSE SERPL-MCNC: 128 MG/DL (ref 65–99)
POTASSIUM SERPL-SCNC: 4.6 MMOL/L (ref 3.6–5.5)
SODIUM SERPL-SCNC: 138 MMOL/L (ref 135–145)

## 2025-01-30 PROCEDURE — 665998 HH PPS REVENUE CREDIT

## 2025-01-30 PROCEDURE — A9270 NON-COVERED ITEM OR SERVICE: HCPCS | Performed by: INTERNAL MEDICINE

## 2025-01-30 PROCEDURE — B24BZZ4 ULTRASONOGRAPHY OF HEART WITH AORTA, TRANSESOPHAGEAL: ICD-10-PCS | Performed by: INTERNAL MEDICINE

## 2025-01-30 PROCEDURE — 700105 HCHG RX REV CODE 258: Performed by: ANESTHESIOLOGY

## 2025-01-30 PROCEDURE — 700111 HCHG RX REV CODE 636 W/ 250 OVERRIDE (IP): Performed by: ANESTHESIOLOGY

## 2025-01-30 PROCEDURE — A9270 NON-COVERED ITEM OR SERVICE: HCPCS | Performed by: HOSPITALIST

## 2025-01-30 PROCEDURE — 700101 HCHG RX REV CODE 250: Performed by: HOSPITALIST

## 2025-01-30 PROCEDURE — 99232 SBSQ HOSP IP/OBS MODERATE 35: CPT | Performed by: INTERNAL MEDICINE

## 2025-01-30 PROCEDURE — 93312 ECHO TRANSESOPHAGEAL: CPT | Mod: 26 | Performed by: INTERNAL MEDICINE

## 2025-01-30 PROCEDURE — 160035 HCHG PACU - 1ST 60 MINS PHASE I

## 2025-01-30 PROCEDURE — 700102 HCHG RX REV CODE 250 W/ 637 OVERRIDE(OP): Performed by: HOSPITALIST

## 2025-01-30 PROCEDURE — 665999 HH PPS REVENUE DEBIT

## 2025-01-30 PROCEDURE — 700102 HCHG RX REV CODE 250 W/ 637 OVERRIDE(OP): Performed by: NEUROLOGICAL SURGERY

## 2025-01-30 PROCEDURE — 82962 GLUCOSE BLOOD TEST: CPT | Mod: 91

## 2025-01-30 PROCEDURE — 160002 HCHG RECOVERY MINUTES (STAT)

## 2025-01-30 PROCEDURE — 99233 SBSQ HOSP IP/OBS HIGH 50: CPT | Performed by: INTERNAL MEDICINE

## 2025-01-30 PROCEDURE — A9270 NON-COVERED ITEM OR SERVICE: HCPCS | Performed by: NEUROLOGICAL SURGERY

## 2025-01-30 PROCEDURE — 770006 HCHG ROOM/CARE - MED/SURG/GYN SEMI*

## 2025-01-30 PROCEDURE — 700101 HCHG RX REV CODE 250: Performed by: ANESTHESIOLOGY

## 2025-01-30 PROCEDURE — 700102 HCHG RX REV CODE 250 W/ 637 OVERRIDE(OP): Performed by: INTERNAL MEDICINE

## 2025-01-30 PROCEDURE — 80048 BASIC METABOLIC PNL TOTAL CA: CPT

## 2025-01-30 PROCEDURE — 700111 HCHG RX REV CODE 636 W/ 250 OVERRIDE (IP): Performed by: INTERNAL MEDICINE

## 2025-01-30 PROCEDURE — 700111 HCHG RX REV CODE 636 W/ 250 OVERRIDE (IP): Mod: JZ,TB | Performed by: INTERNAL MEDICINE

## 2025-01-30 PROCEDURE — 93325 DOPPLER ECHO COLOR FLOW MAPG: CPT

## 2025-01-30 RX ORDER — DIPHENHYDRAMINE HYDROCHLORIDE 50 MG/ML
12.5 INJECTION INTRAMUSCULAR; INTRAVENOUS
Status: DISCONTINUED | OUTPATIENT
Start: 2025-01-30 | End: 2025-01-30 | Stop reason: HOSPADM

## 2025-01-30 RX ORDER — SODIUM CHLORIDE 9 MG/ML
INJECTION, SOLUTION INTRAVENOUS
Status: DISCONTINUED | OUTPATIENT
Start: 2025-01-30 | End: 2025-01-30 | Stop reason: SURG

## 2025-01-30 RX ORDER — ALBUTEROL SULFATE 5 MG/ML
2.5 SOLUTION RESPIRATORY (INHALATION)
Status: DISCONTINUED | OUTPATIENT
Start: 2025-01-30 | End: 2025-01-30 | Stop reason: HOSPADM

## 2025-01-30 RX ORDER — EPHEDRINE SULFATE 50 MG/ML
5 INJECTION, SOLUTION INTRAVENOUS
Status: DISCONTINUED | OUTPATIENT
Start: 2025-01-30 | End: 2025-01-30 | Stop reason: HOSPADM

## 2025-01-30 RX ORDER — PREGABALIN 25 MG/1
25 CAPSULE ORAL EVERY EVENING
Status: DISCONTINUED | OUTPATIENT
Start: 2025-01-30 | End: 2025-02-05 | Stop reason: HOSPADM

## 2025-01-30 RX ORDER — HYDRALAZINE HYDROCHLORIDE 20 MG/ML
5 INJECTION INTRAMUSCULAR; INTRAVENOUS
Status: DISCONTINUED | OUTPATIENT
Start: 2025-01-30 | End: 2025-01-30 | Stop reason: HOSPADM

## 2025-01-30 RX ORDER — SODIUM CHLORIDE, SODIUM LACTATE, POTASSIUM CHLORIDE, CALCIUM CHLORIDE 600; 310; 30; 20 MG/100ML; MG/100ML; MG/100ML; MG/100ML
INJECTION, SOLUTION INTRAVENOUS CONTINUOUS
Status: DISCONTINUED | OUTPATIENT
Start: 2025-01-30 | End: 2025-01-30 | Stop reason: ALTCHOICE

## 2025-01-30 RX ORDER — ONDANSETRON 2 MG/ML
4 INJECTION INTRAMUSCULAR; INTRAVENOUS
Status: DISCONTINUED | OUTPATIENT
Start: 2025-01-30 | End: 2025-01-30 | Stop reason: HOSPADM

## 2025-01-30 RX ORDER — LABETALOL HYDROCHLORIDE 5 MG/ML
5 INJECTION, SOLUTION INTRAVENOUS
Status: DISCONTINUED | OUTPATIENT
Start: 2025-01-30 | End: 2025-01-30 | Stop reason: HOSPADM

## 2025-01-30 RX ORDER — HALOPERIDOL 5 MG/ML
1 INJECTION INTRAMUSCULAR
Status: DISCONTINUED | OUTPATIENT
Start: 2025-01-30 | End: 2025-01-30 | Stop reason: HOSPADM

## 2025-01-30 RX ORDER — LIDOCAINE HYDROCHLORIDE 20 MG/ML
INJECTION, SOLUTION EPIDURAL; INFILTRATION; INTRACAUDAL; PERINEURAL PRN
Status: DISCONTINUED | OUTPATIENT
Start: 2025-01-30 | End: 2025-01-30 | Stop reason: SURG

## 2025-01-30 RX ADMIN — HYDROMORPHONE HYDROCHLORIDE 0.5 MG: 1 INJECTION, SOLUTION INTRAMUSCULAR; INTRAVENOUS; SUBCUTANEOUS at 11:12

## 2025-01-30 RX ADMIN — SENNOSIDES AND DOCUSATE SODIUM 2 TABLET: 50; 8.6 TABLET ORAL at 17:45

## 2025-01-30 RX ADMIN — METOPROLOL SUCCINATE 50 MG: 50 TABLET, EXTENDED RELEASE ORAL at 17:45

## 2025-01-30 RX ADMIN — DEXTROSE MONOHYDRATE 25 G: 25 INJECTION, SOLUTION INTRAVENOUS at 09:34

## 2025-01-30 RX ADMIN — ACETAMINOPHEN 500 MG: 500 TABLET ORAL at 23:27

## 2025-01-30 RX ADMIN — PETROLATUM: 420 OINTMENT TOPICAL at 05:13

## 2025-01-30 RX ADMIN — ACETAMINOPHEN 500 MG: 500 TABLET ORAL at 17:45

## 2025-01-30 RX ADMIN — LEVOTHYROXINE SODIUM 250 MCG: 0.12 TABLET ORAL at 05:12

## 2025-01-30 RX ADMIN — PREGABALIN 25 MG: 25 CAPSULE ORAL at 17:45

## 2025-01-30 RX ADMIN — PROPOFOL 20 MG: 10 INJECTION, EMULSION INTRAVENOUS at 14:11

## 2025-01-30 RX ADMIN — PROPOFOL 50 MG: 10 INJECTION, EMULSION INTRAVENOUS at 14:03

## 2025-01-30 RX ADMIN — DULOXETINE 30 MG: 30 CAPSULE, DELAYED RELEASE ORAL at 05:11

## 2025-01-30 RX ADMIN — METHOCARBAMOL 750 MG: 750 TABLET ORAL at 17:44

## 2025-01-30 RX ADMIN — SODIUM CHLORIDE, POTASSIUM CHLORIDE, SODIUM LACTATE AND CALCIUM CHLORIDE: 600; 310; 30; 20 INJECTION, SOLUTION INTRAVENOUS at 16:04

## 2025-01-30 RX ADMIN — PETROLATUM: 420 OINTMENT TOPICAL at 17:46

## 2025-01-30 RX ADMIN — METHOCARBAMOL 750 MG: 750 TABLET ORAL at 12:33

## 2025-01-30 RX ADMIN — TORSEMIDE 100 MG: 100 TABLET ORAL at 09:59

## 2025-01-30 RX ADMIN — HEPARIN SODIUM 5000 UNITS: 5000 INJECTION, SOLUTION INTRAVENOUS; SUBCUTANEOUS at 16:01

## 2025-01-30 RX ADMIN — ACETAMINOPHEN 500 MG: 500 TABLET ORAL at 12:33

## 2025-01-30 RX ADMIN — VENLAFAXINE HYDROCHLORIDE 150 MG: 75 CAPSULE, EXTENDED RELEASE ORAL at 05:12

## 2025-01-30 RX ADMIN — OXYCODONE HYDROCHLORIDE 15 MG: 15 TABLET ORAL at 17:42

## 2025-01-30 RX ADMIN — ACETAMINOPHEN 500 MG: 500 TABLET ORAL at 05:12

## 2025-01-30 RX ADMIN — PROPOFOL 20 MG: 10 INJECTION, EMULSION INTRAVENOUS at 14:04

## 2025-01-30 RX ADMIN — INSULIN GLARGINE-YFGN 8 UNITS: 100 INJECTION, SOLUTION SUBCUTANEOUS at 17:51

## 2025-01-30 RX ADMIN — PROPOFOL 20 MG: 10 INJECTION, EMULSION INTRAVENOUS at 14:15

## 2025-01-30 RX ADMIN — HEPARIN SODIUM 5000 UNITS: 5000 INJECTION, SOLUTION INTRAVENOUS; SUBCUTANEOUS at 05:13

## 2025-01-30 RX ADMIN — PROPOFOL 30 MG: 10 INJECTION, EMULSION INTRAVENOUS at 14:09

## 2025-01-30 RX ADMIN — OXYCODONE HYDROCHLORIDE 15 MG: 15 TABLET ORAL at 23:36

## 2025-01-30 RX ADMIN — PROPOFOL 30 MG: 10 INJECTION, EMULSION INTRAVENOUS at 14:05

## 2025-01-30 RX ADMIN — OXYCODONE HYDROCHLORIDE 15 MG: 15 TABLET ORAL at 09:58

## 2025-01-30 RX ADMIN — TRAZODONE HYDROCHLORIDE 150 MG: 50 TABLET ORAL at 21:17

## 2025-01-30 RX ADMIN — PROPOFOL 30 MG: 10 INJECTION, EMULSION INTRAVENOUS at 14:07

## 2025-01-30 RX ADMIN — GABAPENTIN 300 MG: 300 CAPSULE ORAL at 05:12

## 2025-01-30 RX ADMIN — PROPOFOL 20 MG: 10 INJECTION, EMULSION INTRAVENOUS at 14:13

## 2025-01-30 RX ADMIN — METHOCARBAMOL 750 MG: 750 TABLET ORAL at 05:11

## 2025-01-30 RX ADMIN — INSULIN LISPRO 5 UNITS: 100 INJECTION, SOLUTION INTRAVENOUS; SUBCUTANEOUS at 17:52

## 2025-01-30 RX ADMIN — AMLODIPINE BESYLATE 10 MG: 10 TABLET ORAL at 05:12

## 2025-01-30 RX ADMIN — LIDOCAINE HYDROCHLORIDE 100 MG: 20 INJECTION, SOLUTION EPIDURAL; INFILTRATION; INTRACAUDAL; PERINEURAL at 14:03

## 2025-01-30 RX ADMIN — SODIUM CHLORIDE: 9 INJECTION, SOLUTION INTRAVENOUS at 13:58

## 2025-01-30 RX ADMIN — OXYCODONE HYDROCHLORIDE 15 MG: 15 TABLET ORAL at 13:01

## 2025-01-30 ASSESSMENT — ENCOUNTER SYMPTOMS
WEAKNESS: 1
BACK PAIN: 1
FEVER: 0
NAUSEA: 0
ABDOMINAL PAIN: 0
CHILLS: 0
SHORTNESS OF BREATH: 0
NECK PAIN: 1
MYALGIAS: 1
NERVOUS/ANXIOUS: 1

## 2025-01-30 ASSESSMENT — PAIN DESCRIPTION - PAIN TYPE
TYPE: ACUTE PAIN

## 2025-01-30 NOTE — PROGRESS NOTES
Nephrology Daily Progress Note    Date of Service  1/29/2025    Chief Complaint  69 y.o. female admitted 1/22/2025 with ESRD, bilateral arm weakness     Interval Problem Update  Patient is complaining of severe headache and neck pain  1/24 patient feels better today   unfortunately 24-hour urine was not being collected  We will start 24-hour urine study for creatinine clearance today  1/26 patient is complaining of neuropathy pain in her arms  1/27 -still complains of pain, mostly in her left arm.  Denies chest pain, shortness of breath  1/29 -patient had dialysis yesterday with 0.5 L removed.  Found to have Enterococcus bacteremia.  Denies chest pain, shortness of breath, complains of ongoing neck and left arm pain.    Review of Systems  Review of Systems   Constitutional:  Negative for fever.   Respiratory:  Negative for shortness of breath.    Cardiovascular:  Negative for chest pain.   Gastrointestinal:  Negative for abdominal pain.   Musculoskeletal:  Positive for neck pain.   All other systems reviewed and are negative.       Physical Exam  Temp:  [35.9 °C (96.7 °F)-36.6 °C (97.8 °F)] 35.9 °C (96.7 °F)  Pulse:  [68-81] 81  Resp:  [14-20] 18  BP: ()/(58-75) 115/59  SpO2:  [90 %-97 %] 90 %    Physical Exam  Constitutional:       General: She is not in acute distress.     Appearance: She is well-developed.   HENT:      Head: Normocephalic and atraumatic.      Mouth/Throat:      Mouth: Mucous membranes are moist.      Pharynx: Oropharynx is clear. No oropharyngeal exudate.   Eyes:      General: No scleral icterus.     Extraocular Movements: Extraocular movements intact.   Neck:      Trachea: No tracheal deviation.   Cardiovascular:      Rate and Rhythm: Normal rate and regular rhythm.      Heart sounds: Normal heart sounds. No murmur heard.  Pulmonary:      Effort: Pulmonary effort is normal.      Breath sounds: No stridor. No rales.   Abdominal:      Palpations: Abdomen is soft.      Tenderness: There is no  abdominal tenderness.   Musculoskeletal:         General: Normal range of motion.      Cervical back: Normal range of motion. No rigidity.      Right lower leg: No edema.      Left lower leg: No edema.   Skin:     General: Skin is warm and dry.   Neurological:      General: No focal deficit present.      Mental Status: She is alert and oriented to person, place, and time.   Psychiatric:         Mood and Affect: Mood normal.         Behavior: Behavior normal.     Dialysis access: Right IJ permacath.  Left arm AV graft without bruit or thrill    Fluids    Intake/Output Summary (Last 24 hours) at 1/29/2025 1718  Last data filed at 1/29/2025 1412  Gross per 24 hour   Intake 640 ml   Output --   Net 640 ml       Laboratory  Labs reviewed, pertinent labs below.        Recent Labs     01/27/25  0032 01/28/25  0241   SODIUM 136 136   POTASSIUM 5.4 5.0   CHLORIDE 107 106   CO2 16* 18*   GLUCOSE 135* 97   BUN 75* 84*   CREATININE 3.88* 3.80*   CALCIUM 7.9* 8.0*               URINALYSIS:  Lab Results   Component Value Date/Time    COLORURINE DK Yellow 02/28/2024 0544    CLARITY Clear 02/28/2024 0544    SPECGRAVITY 1.021 02/28/2024 0544    PHURINE 5.5 02/28/2024 0544    KETONES Negative 02/28/2024 0544    PROTEINURIN 300 (A) 02/28/2024 0544    BILIRUBINUR Small (A) 02/28/2024 0544    UROBILU 1.0 02/28/2024 0544    NITRITE Negative 02/28/2024 0544    LEUKESTERAS Negative 02/28/2024 0544    OCCULTBLOOD Trace (A) 02/28/2024 0544     UPC  Lab Results   Component Value Date/Time    TOTPROTUR 557.0 (H) 02/28/2024 0544      Lab Results   Component Value Date/Time    CREATININEU 82.41 02/28/2024 0544         Imaging interpreted by radiologist. Imaging reports reviewed with pertinent findings below  US-EXTREMITY VENOUS UPPER UNILAT LEFT   Final Result      EC-ECHOCARDIOGRAM COMPLETE W/O CONT   Final Result      MR-CERVICAL SPINE-WITH & W/O   Final Result         1.  Postoperative changes in the cervical spine as described above.      2.   Abnormal signal is noted in the prevertebral soft tissues extending from the mid C2 to the C7-T1 level. Abnormal marrow signal is noted at the C7-T1 level with mild enhancement of the intervertebral disc space at this level. These findings are    concerning for discitis-osteomyelitis.      3.  There is moderate spinal canal stenosis at C6-7.         US-RUQ   Final Result      1.  Prior cholecystectomy.      2.  Common bile duct diameter measured at 12 mm with some intrahepatic biliary ductal dilatation. This may be related to presence of prior cholecystectomy.      3.  The liver is heterogeneous consistent with fatty change versus hepatocellular dysfunction.      DX-CHEST-PORTABLE (1 VIEW)   Final Result         1.  No acute cardiopulmonary disease.   2.  Atherosclerosis      EC-RAFIQ W/O CONT    (Results Pending)         Current Facility-Administered Medications   Medication Dose Route Frequency Provider Last Rate Last Admin    [START ON 1/31/2025] DAPTOmycin (Cubicin) 700 mg in NS 50 mL IVPB  12 mg/kg Intravenous Q48HRS Payton Han M.D.        oxyCODONE immediate release (Roxicodone) tablet 10 mg  10 mg Oral Q3HRS PRN Annie Esposito, D.O.        Or    oxycodone (Oxy-IR) immediate release tablet 15 mg  15 mg Oral Q3HRS PRN Annie Esposito, D.O.        Or    HYDROmorphone (Dilaudid) injection 0.5 mg  0.5 mg Intravenous Q3HRS PRN Annie Esposito, D.O.        hydrOXYzine HCl (Atarax) tablet 25 mg  25 mg Oral 4X/DAY PRN Annie Esposito D.O.        heparin intracatheter (for DIALYSIS USE ONLY) 1,800 Units  1,800 Units Intracatheter DIALYSIS PRN Ankit Diggs M.D.   1,800 Units at 01/28/25 1240    heparin intracatheter (for DIALYSIS USE ONLY) 1,800 Units  1,800 Units Intracatheter DIALYSIS PRN Ankit Diggs M.D.   1,800 Units at 01/28/25 1240    gabapentin (Neurontin) capsule 300 mg  300 mg Oral Daily-0600 Annie Esposito D.O.   300 mg at 01/29/25 0557    acetaminophen (Tylenol) tablet 500 mg  500 mg Oral Q6HRS  Annie Esposito D.O.   500 mg at 01/29/25 1302    acetaminophen (Tylenol) tablet 500 mg  500 mg Oral Q6HRS PRN Annie Esposito D.O.        torsemide (Demadex) tablet 100 mg  100 mg Oral Q DAY Ankit Diggs M.D.   100 mg at 01/29/25 0605    NS (Bolus) 0.9 % infusion 100 mL  100 mL Intravenous DIALYSIS PRN Ankit Diggs M.D.        methocarbamol (Robaxin) tablet 750 mg  750 mg Oral TID RODRICK FalconOChey   750 mg at 01/29/25 1155    petrolatum 42 % ointment   Topical BID Dalton Fowler M.D.   Given at 01/29/25 0558    insulin GLARGINE (Lantus,Semglee) injection  15 Units Subcutaneous Q EVENING Dalton Fowler M.D.   15 Units at 01/27/25 1748    metoprolol SR (Toprol XL) tablet 50 mg  50 mg Oral Q EVENING Jigar Cabrales M.D.   50 mg at 01/28/25 1748    amLODIPine (Norvasc) tablet 10 mg  10 mg Oral DAILY Cr Sánchez M.D.   10 mg at 01/29/25 0557    DULoxetine (Cymbalta) capsule 30 mg  30 mg Oral DAILY Cr Sánchez M.D.   30 mg at 01/29/25 0558    levothyroxine (Synthroid) tablet 250 mcg  250 mcg Oral AM ES Cr Sánchez M.D.   250 mcg at 01/29/25 0558    traZODone (Desyrel) tablet 150 mg  150 mg Oral QHS Cr Sánchez M.D.   150 mg at 01/28/25 2148    venlafaxine XR (Effexor XR) capsule 150 mg  150 mg Oral DAILY Cr Sánchez M.D.   150 mg at 01/29/25 0557    heparin injection 5,000 Units  5,000 Units Subcutaneous Q8HRS Annie Esposito D.O.   5,000 Units at 01/29/25 1418    senna-docusate (Pericolace Or Senokot S) 8.6-50 MG per tablet 2 Tablet  2 Tablet Oral Q EVENING Cr Sánchez M.D.   2 Tablet at 01/28/25 1748    And    polyethylene glycol/lytes (Miralax) Packet 1 Packet  1 Packet Oral QDAY PRN Cr Sánchez M.D.   1 Packet at 01/24/25 0512    insulin lispro (HumaLOG,AdmeLOG) subcutaneous injection  2-9 Units Subcutaneous 4X/DAY ACHS Cr Sánchez M.D.   3 Units at 01/29/25 1152    And    dextrose 50% (D50W) injection 25 g  25 g Intravenous Q15 MIN PRN Cr Sánchez M.D.   25 g at 01/28/25 0805    Pharmacy  "Consult Request ...Pain Management Review 1 Each  1 Each Other PHARMACY TO DOSE WILLIAM Castillo.             Assessment/Plan  69 y.o. female admitted 1/22/2025 with ESRD, bilateral arm weakness    1.  ESRD previously on hemodialysis Monday Wednesday Friday.  Nonoliguric, with enough residual kidney function to do dialysis only twice a week.  Continue dialysis on Mondays and Fridays only.  Check daily weights.  Avoid NSAIDs and other nephrotoxins as she still urinates.  Check renal function panel at least 3 times per week on Mondays Wednesdays and Fridays.    2.  Dialysis access: Right IJ permacath.  Left arm AV graft has clotted.  There is no need for left arm precautions.  Per infectious disease, no need for CVC removal with Enterococcus faecium bacteremia.    3.  Arm weakness and left arm pain, reason for admission.   Cervical MRI concerning for osteomyelitis.  Not a candidate for surgical bone biopsy.  Blood cultures showing Enterococcus faecium, likely the cause of osteomyelitis.  Patient will need a long course of IV antibiotics per infectious disease, hopefully can be achieved with just twice weekly infusions after dialysis.      4.  Anemia of chronic disease.  Stable.  There is no acute need for ANDREINA's with hemoglobin over 10.  Check CBC at least 3 times per week.    5.  Hypertension.  Fairly well-controlled.  Will continue ultrafiltration as tolerated by blood pressure, as high blood pressure usually improves with ultrafiltration with dialysis.  I recommend a low-sodium diet.       6.  Hyperphosphatemia, fairly well-controlled.  I recommend a low phosphorus diet.  No acute need for phosphorus binders.   Check \"renal function panel\" at least on Mondays Wednesdays and Fridays (includes phosphorus level).       Ankit Diggs MD  Nephrology  [facility]          "

## 2025-01-30 NOTE — PROGRESS NOTES
Pt back to unit from RAFIQ. Pt reports 7/10 pain and denies intervention at this time. Pt requesting to eat. Per MD ok to advance back to diabetic renal diet.

## 2025-01-30 NOTE — PROGRESS NOTES
1425 Pt arrived from ECHO lab fully sedated with OPA in place. Report received by anesthesiologist. Pt placed on monitor. VSS.    1432 OPA removed pt fully awake alert and oriented x 4 with no c/o pain or nausea.     1433 Oxygen removed with patient saturation staying greater than 90%     1505 Report called to floor RN.     1515 Pt transported up stairs with RN.

## 2025-01-30 NOTE — PROGRESS NOTES
Hospital Medicine Daily Progress Note    Date of Service  1/30/2025    Chief Complaint  Anu Manuel is a 69 y.o. female admitted 1/22/2025 with neck pain     Hospital Course  Anu Manuel is a 69 y.o. female with past medical history of insulin-dependent diabetes mellitus, ESRD on dialysis, CAD, recent admission for acute on chronic back pain who presented 1/22/2025 with bilateral arm pain.  MRI of the cervical spine from 1/8/2025 revealed abnormal bone marrow signal with raising the possibility of infection and short-term follow-up imaging was recommended. Nephrology consulted for scheduled dialysis.  MRI cervical spine showed C7-T1 findings concerning for discitis/osteomyelitis with moderate spinal canal stenosis at C6-7.  Neurosurgery was consulted recommended IR bone biopsy and conservative management.  Did not recommend surgery at this time.  Discussed with IR however they do not perform cervical bone biopsies.  Patient found to be bacteremic and infectious disease was consulted started on IV antibiotics.    Interval Problem Update  1/28 vital stable on room air, afebrile  Creatinine 3.80, GFR 12  Blood cultures 1/2 Gram stain with gram-positive cocci in pairs  Notified by nursing patient with severe hypoglycemia this morning blood glucose 37, given IV dextrose, repeat 112  Discussed with interventional radiology, they do not perform cervical bone biopsies  Discussed with neurosurgery recommending conservative nonoperative treatment for now recommended IV antibiotics  Discussed with infectious disease started on vancomycin, recommended an echocardiogram  Echo pending  Patient very tearful and anxious at time of my evaluation.  She reports the  oral oxycodone takes her pain from 10 to a 6 but she has been having burning pains in her left arm.  After discussion with pharmacy will increase gabapentin to 300 mg which is max for her ESRD.  Start scheduled Tylenol.  Continues to require IV  Dilaudid for breakthrough pain, continue to monitor respiratory status closely    1/29 vital stable on room air  1/2 blood culture with Enterococcus faecium   -Repeat blood cultures ordered for tomorrow  Echocardiogram showed EF 70%, normal diastolic function echodensity on the mitral valve and tricuspid valve suggested of valvular vegetation.  Discussed with infectious disease he requested a RAFIQ, changed antibiotic to daptomycin  Discussed with cardiology, plan for RAFIQ tomorrow n.p.o. at midnight  Glucose 300 this morning, it appears per MAR glargine was held yesterday evening  Patient continues to complain of severe pain in her left arm mostly that she reports is still the burning pain but also feels deep in her left upper bicep.  Venous ultrasound was negative for DVT.  Increase oral oxycodone dosing and gave a one-time dose extra Dilaudid.  Thus far respiratory status and blood pressure stable continue to monitor.    1/30 no acute events overnight  Creatinine 3.39, GFR 14  Patient was hypoglycemic this morning at 40, given D50 with improvement.  Decrease glargine 8 units nightly  Changed gabapentin to Lyrica to help with pain control  Patient went for RAFIQ this afternoon, discussed with cardiology she did not have any valvular vegetations however she had an irregular echogenic density in the right atrium by the IVC with a mobile density possibly infection or thrombus.  Recommended discussing with radiology to see if CT could better assess this  Discussed with radiology, CT would be unable to distinguish mass versus thrombus versus infection  Discussed with infectious disease, she will need 6 weeks of antibiotics from date of negative cultures  Repeat blood cultures pending  Discussed with nephrology plan for HD tomorrow  Today patient reports that her left arm plain has completely resolved she is now having pain in her right arm instead.  Still very tearful and anxious.  I am concerned that her anxiety is  contributing to worsening of her symptoms.  She is agreeable to talking with psychiatry.  Psychiatry consulted    I have discussed this patient's plan of care and discharge plan at IDT rounds today with Case Management, Nursing, Nursing leadership, and other members of the IDT team.    Consultants/Specialty  nephrology  Infectious disease  Cardiology  Psychiatry    Code Status  DNAR/DNI    Disposition  The patient is not medically cleared for discharge to home or a post-acute facility.      I have placed the appropriate orders for post-discharge needs.    Review of Systems  Review of Systems   Constitutional:  Positive for malaise/fatigue. Negative for chills and fever.   Respiratory:  Negative for shortness of breath.    Cardiovascular:  Negative for chest pain.   Gastrointestinal:  Negative for abdominal pain and nausea.   Musculoskeletal:  Positive for back pain, joint pain, myalgias and neck pain.   Neurological:  Positive for weakness.   Psychiatric/Behavioral:  The patient is nervous/anxious.         Physical Exam  Temp:  [35.8 °C (96.4 °F)-36.7 °C (98 °F)] 36.4 °C (97.5 °F)  Pulse:  [60-76] 72  Resp:  [14-18] 18  BP: ()/(57-74) 106/64  SpO2:  [92 %-100 %] 94 %    Physical Exam  Constitutional:       General: She is not in acute distress.     Appearance: Normal appearance. She is ill-appearing.   HENT:      Mouth/Throat:      Pharynx: Oropharynx is clear.   Eyes:      Conjunctiva/sclera: Conjunctivae normal.   Cardiovascular:      Rate and Rhythm: Normal rate and regular rhythm.      Pulses: Normal pulses.      Comments: Palpable radial pulses bilaterally  Pulmonary:      Effort: Pulmonary effort is normal. No respiratory distress.      Breath sounds: No wheezing.   Abdominal:      General: There is distension.      Palpations: Abdomen is soft.      Tenderness: There is no abdominal tenderness.   Musculoskeletal:         General: Tenderness (Left arm) present.      Right lower leg: No edema.      Left  lower leg: No edema.   Skin:     General: Skin is warm.   Neurological:      General: No focal deficit present.      Mental Status: She is alert and oriented to person, place, and time.      Comments: Overall motor strength 5 out of 5 all extremity, does have weak left hand , sensory intact   Psychiatric:         Mood and Affect: Mood is anxious. Affect is tearful.         Fluids  No intake or output data in the 24 hours ending 01/30/25 1706         Laboratory        Recent Labs     01/28/25  0241 01/30/25  1237   SODIUM 136 138   POTASSIUM 5.0 4.6   CHLORIDE 106 102   CO2 18* 24   GLUCOSE 97 128*   BUN 84* 55*   CREATININE 3.80* 3.39*   CALCIUM 8.0* 8.1*                   Imaging  EC-RAFIQ W/O CONT         US-EXTREMITY VENOUS UPPER UNILAT LEFT   Final Result      EC-ECHOCARDIOGRAM COMPLETE W/O CONT   Final Result      MR-CERVICAL SPINE-WITH & W/O   Final Result         1.  Postoperative changes in the cervical spine as described above.      2.  Abnormal signal is noted in the prevertebral soft tissues extending from the mid C2 to the C7-T1 level. Abnormal marrow signal is noted at the C7-T1 level with mild enhancement of the intervertebral disc space at this level. These findings are    concerning for discitis-osteomyelitis.      3.  There is moderate spinal canal stenosis at C6-7.         US-RUQ   Final Result      1.  Prior cholecystectomy.      2.  Common bile duct diameter measured at 12 mm with some intrahepatic biliary ductal dilatation. This may be related to presence of prior cholecystectomy.      3.  The liver is heterogeneous consistent with fatty change versus hepatocellular dysfunction.      DX-CHEST-PORTABLE (1 VIEW)   Final Result         1.  No acute cardiopulmonary disease.   2.  Atherosclerosis           Assessment/Plan  * Radiculopathy affecting upper extremity- (present on admission)  Assessment & Plan  Her pain has been debilitating despite Neurontin and Cymbalta.  She had the abnormal MRI  noted below.  Because of this the MRI will be repeated as she may benefit from spine surgery consultation for either inpatient or outpatient management based on the results.  .  She will be given oral narcotics and no further IV narcotics at this time in order to start the transition of discharge home eventually on oral   Continue on Cymbalta, gabapentin  MRI C-spine showed osteomyelitis/discitis  Requiring frequent IV narcotics, close monitoring for toxicity while on IV controlled substance.   Neurosurgery recommending conservative management, no surgical intervention at this time  Infectious disease consulted    Bacteremia- (present on admission)  Assessment & Plan  1/2 blood cultures from admission Enterococcus  Infectious disease consulted  -Changed to vancomycin  -Echocardiogram showed endocarditis  -Repeat blood cultures per ID    Right atrial mass- (present on admission)  Assessment & Plan  TTE showed possible tricuspid and mitral valve endocarditis  Infectious disease following  Cardiology consulted, RAFIQ 1/30- showed right atrial mass with mobile density   Unclear if this is mass versus clot versus infection  Discussed with IR, CT would not delineate this    Acute osteomyelitis of cervical spine (HCC)- (present on admission)  Assessment & Plan  MRI showed C7-T1 findings concerning for discitis osteomyelitis  Infectious disease consulted  -Vancomycin  Follow-up blood culture result  Neurosurgery recommended conservative management with IV antibiotics, no surgical intervention  Discussed with IR, unable to perform cervical spine bone biopsy    CKD (chronic kidney disease) stage 4, GFR 15-29 ml/min ([facility])- (present on admission)  Assessment & Plan  Continue on torsemide 100 mg daily  Repeat BMP in a.m. to monitor renal function  Nephrology following for dialysis needs      Intractable back pain- (present on admission)  Assessment & Plan  Also complaining of severe burning neuropathic pain in her arm  Increase  gabapentin 300 mg daily, continue duloxetine  Start scheduled Tylenol 500 mg every 6 hours  Continue oral and IV opiates as needed    Type 2 diabetes mellitus, with long-term current use of insulin ([facility])- (present on admission)  Assessment & Plan  On sliding scale and glargine  Monitor of for hypoglycemic episode    DNR (do not resuscitate)- (present on admission)  Assessment & Plan  Per her wishes  She has been followed by palliative care in the past        ESRD needing dialysis ([facility])- (present on admission)  Assessment & Plan  She has a left arm fistula  Nephrology will be consulted for dialysis    CAD S/P percutaneous coronary angioplasty- (present on admission)  Assessment & Plan  History of    Primary hypertension- (present on admission)  Assessment & Plan  Continue Norvasc and Toprol with holding parameters         VTE prophylaxis: heparin SC    I have performed a physical exam and reviewed and updated ROS and Plan today (1/30/2025). In review of yesterday's note (1/29/2025), there are no changes except as documented above.

## 2025-01-30 NOTE — PROGRESS NOTES
Assumed care at 1900. Received report from MINI Rodriguez. Pt is resting in bed. Assessment completed. A&O X 4. VSS. No complaints of pain, chest pain, SOB, or N/V. Lung sounds clear. No cardiac murmurs noted. Normoactive bowel sounds in all quadrants. Pt reports 5/10 pain at this time, declines medication. Pt is on RA. Pt is SBA. Educated on POC and verbalizes understanding. Bed is locked and in the lowest position. Call light and belongings are within reach. Bed alarm on. No further needs at this time.

## 2025-01-30 NOTE — CARE PLAN
The patient is Stable - Low risk of patient condition declining or worsening    Shift Goals  Clinical Goals: pt pain will be kept at 0/10  Patient Goals: pain management and sleep  Family Goals: RILEY    Progress made toward(s) clinical / shift goals:  Patient and family educated on plan of care and verbalized understanding, being able to identify barriers for discharge. Patient has remained free of falls this shift with appropriate fall precautions in place throughout. Patient skin integrity maintained throughout shift with skin interventions and checks. Pt rounded on hourly and all needs met.     Problem: Pain - Standard  Goal: Alleviation of pain or a reduction in pain to the patient’s comfort goal  Outcome: Progressing     Problem: Knowledge Deficit - Standard  Goal: Patient and family/care givers will demonstrate understanding of plan of care, disease process/condition, diagnostic tests and medications  Outcome: Progressing     Problem: Fall Risk  Goal: Patient will remain free from falls  Outcome: Progressing       Patient is not progressing towards the following goals:

## 2025-01-30 NOTE — PROGRESS NOTES
Requested RAFIQ for bacteremia (enterococcus fecia) and abnormal echo with possible vegetation on tricuspid and mitral valve.     69 year old female with CAD stent to LAD in 8/10/20, normal LVEF. DM, renal insufficiency,  presented to ER on 1/22/25 with back pain.      NKDA    The risks, benefits, and alternatives to transesophageal echocardiogram (RAFIQ) with IV sedation were discussed with the patient in specific detail, including but not limited respiratory depression, aspiration pneumonia, dental damage, oropharyngeal and esophageal traumas including hoarseness and dysphagia after the procedure. Rare cases demonstrating serious or fatal complications associated with RAFIQ have been reported in the adult population, including cardiac, pulmonary and bleeding complications in less than 1% of people. Patients with an identified intracardiac thrombus are at increased risk for embolic events (absolute risk uncertain, range 0%-38%), and this appears to be reduced with anticoagulation therapy (absolute risk reduction uncertain).  As with any procedures, very rare risk of death.  The patient verbalized understanding about these possible complications, and wishes to proceed with this procedure.    NPO after midnight  Plan RAFIQ 1/30/25.

## 2025-01-30 NOTE — PROGRESS NOTES
Neurosurgery Progress Note    Subjective:  68 yo woman with history of multiple cervical surgeries presented with intractable neck and left arm pain. Found to have questionable osteomyelitis/discitis at C7-T1. She was intact but had pain limited weakness to the left arm.     IR was consulted and unable/did not recommend biopsy of the C7-T1 region.  Was found to have enterococcus bacteremia, ID is following and patient is on daptomycin  Going for RAFIQ today as there is concern for endocarditis.     Patient feels that neck and arm pain is improved and that her strength is back in the left arm now that her pain is controlled.    Exam:  GCS: [id number].  Appropriate, normal mood and affect.   CN II-XII grossly intact.   Face symmetric.  PERRL, EOMI.   Motor with breakaway weakness to the left intrinsics, otherwise 5/5 bilateral upper extremities  Sensation intact.       BP  Min: 97/61  Max: 142/59  Pulse  Av.5  Min: 60  Max: 81  Resp  Av.6  Min: 15  Max: 20  Temp  Av.2 °C (97.1 °F)  Min: 35.9 °C (96.7 °F)  Max: 36.7 °C (98 °F)  SpO2  Av.4 %  Min: 90 %  Max: 99 %    No data recorded        Recent Labs     25  0241   SODIUM 136   POTASSIUM 5.0   CHLORIDE 106   CO2 18*   GLUCOSE 97   BUN 84*   CREATININE 3.80*   CALCIUM 8.0*               Intake/Output                         25 0700 - 25 0659 25 0700 - 25 0659     1900-0659 Total  8668-1739 Total                 Intake    P.O.  640  -- 640  --  -- --    P.O. 640 -- 640 -- -- --    Total Intake 640 -- 640 -- -- --       Output    Total Output -- -- -- -- -- --       Net I/O     640 -- 640 -- -- --              Intake/Output Summary (Last 24 hours) at 2025 0928  Last data filed at 2025 1412  Gross per 24 hour   Intake 640 ml   Output --   Net 640 ml             [START ON 2025] DAPTOmycin  12 mg/kg Q48HRS    oxyCODONE immediate-release  10 mg Q3HRS PRN    Or    oxyCODONE immediate-release  15 mg  Q3HRS PRN    Or    HYDROmorphone  0.5 mg Q3HRS PRN    hydrOXYzine HCl  25 mg 4X/DAY PRN    heparin  1,800 Units DIALYSIS PRN    heparin  1,800 Units DIALYSIS PRN    gabapentin  300 mg Daily-0600    acetaminophen  500 mg Q6HRS    acetaminophen  500 mg Q6HRS PRN    torsemide  100 mg Q DAY    NS  100 mL DIALYSIS PRN    methocarbamol  750 mg TID    petrolatum   BID    insulin GLARGINE  15 Units Q EVENING    metoprolol SR  50 mg Q EVENING    amLODIPine  10 mg DAILY    DULoxetine  30 mg DAILY    levothyroxine  250 mcg AM ES    traZODone  150 mg QHS    venlafaxine XR  150 mg DAILY    heparin  5,000 Units Q8HRS    senna-docusate  2 Tablet Q EVENING    And    polyethylene glycol/lytes  1 Packet QDAY PRN    insulin lispro  2-9 Units 4X/DAY ACHS    And    dextrose bolus  25 g Q15 MIN PRN    Pharmacy Consult Request  1 Each PHARMACY TO DOSE       Assessment and Plan:  Hospital day # 8  Discussed plan of care with Dr. Rose 1/30/25 who is not recommending surgical intervention at this time. Maximal medical management with continued ID input, antibiotic therapy and PT is preferable.   Appreciate specialist care.   Patient is cleared for discharge whenever medically stable from neurosurgical perspective. She will follow up outpatient with Dr. Rose at [facility] once discharged.   Neurosurgery is signing off, please re-consult if needed.     Chemical prophylactic DVT therapy: Yes  Start date/time: ok from NS perspective     Brain Compression: No

## 2025-01-30 NOTE — CARE PLAN
The patient is Stable - Low risk of patient condition declining or worsening    Shift Goals  Clinical Goals: pt will report tolerable pain level, pt will have ECHO  Patient Goals: pain control  Family Goals: RILEY    Progress made toward(s) clinical / shift goals:  pt continues to have neck and LUE pain, controlled with pain medication. Pt became orthostatic while ambulating with PT. Pt received a 500ml NS bolus and pt reported lightheadedness and dizziness improved.   Problem: Pain - Standard  Goal: Alleviation of pain or a reduction in pain to the patient’s comfort goal  Outcome: Progressing     Problem: Knowledge Deficit - Standard  Goal: Patient and family/care givers will demonstrate understanding of plan of care, disease process/condition, diagnostic tests and medications  Outcome: Progressing     Problem: Fall Risk  Goal: Patient will remain free from falls  Outcome: Progressing     Problem: Acute Care of the Diabetic Patient  Goal: Optimal Outcome for the Diabetic Patient  Outcome: Progressing     Problem: Bowel Elimination  Goal: Establish and maintain regular bowel function  Outcome: Progressing       Patient is not progressing towards the following goals:

## 2025-01-30 NOTE — PROCEDURES
Procedure performed: Transesophageal Echo with Anesthesia present for sedation    : Effie Vanegas MD    Assistant: None    Anesthesia: Per anesthesia services    Indication: bacteremia and abnormal TTE    Preprocedural Diagnosis:   Bacteremia and abnormal TTE  Postprocedural Diagnosis: Same    Description of procedure:    Ms. Manuel was brought to the pre/post procedure area of the cath lab. Informed consent was obtained. Defibrillator pads were placed in the anterior and posterior position.  Adequate sedation was obtained with assistance of anesthesia. A RAFIQ performed confirmed that there was   She was monitored in the recovery area until criteria met and will be discharged home.    Conclusion:    Sclerotic valves with mitral annular calcifications, but no obvious valvular vegetation.   There is a irregular echogenic density adherent to the right atrial posterior wall by IVC entry with mobile linear density off the density that can be either infections or thrombus.  Does not appear attached to the catheter tip, but cannot completely rule out.   Discussed with Dr. Esposito, would talk with radiology to see if can get CT to better assess the mass and see if can distinguish between thrombus vs mass.    Formal cardiology consult not done, but please consult if needed.    EBL: None    Complications: None apparent      Electronically signed: Effie Vanegas MD  Cardiologist [facility]

## 2025-01-30 NOTE — PROGRESS NOTES
Nephrology Daily Progress Note    Date of Service  1/30/2025    Chief Complaint  69 y.o. female admitted 1/22/2025 with ESRD, bilateral arm weakness, found to have endocarditis and Enterococcus faecalis bacteremia     Interval Problem Update  Patient is complaining of severe headache and neck pain  1/24 patient feels better today   unfortunately 24-hour urine was not being collected  We will start 24-hour urine study for creatinine clearance today  1/26 patient is complaining of neuropathy pain in her arms  1/27 -still complains of pain, mostly in her left arm.  Denies chest pain, shortness of breath  1/29 -patient had dialysis yesterday with 0.5 L removed.  Found to have Enterococcus bacteremia.  Denies chest pain, shortness of breath, complains of ongoing neck and left arm pain.  1/30-left-sided arm pain improved, patient now has right shoulder pain.  Denies chest pain, shortness of breath.  Still urinating.    Review of Systems  Review of Systems   Constitutional:  Negative for fever.   Respiratory:  Negative for shortness of breath.    Cardiovascular:  Negative for chest pain.   Gastrointestinal:  Negative for abdominal pain.   All other systems reviewed and are negative.       Physical Exam  Temp:  [35.8 °C (96.4 °F)-36.7 °C (98 °F)] 35.8 °C (96.4 °F)  Pulse:  [60-81] 66  Resp:  [14-18] 14  BP: ()/(57-70) 95/57  SpO2:  [90 %-100 %] 100 %    Physical Exam  Constitutional:       General: She is not in acute distress.     Appearance: She is well-developed.   HENT:      Head: Normocephalic and atraumatic.      Mouth/Throat:      Mouth: Mucous membranes are moist.      Pharynx: Oropharynx is clear. No oropharyngeal exudate.   Eyes:      General: No scleral icterus.     Extraocular Movements: Extraocular movements intact.   Neck:      Trachea: No tracheal deviation.   Cardiovascular:      Rate and Rhythm: Normal rate and regular rhythm.      Heart sounds: Normal heart sounds. No murmur heard.  Pulmonary:       Effort: Pulmonary effort is normal.      Breath sounds: No stridor. No rales.   Abdominal:      Palpations: Abdomen is soft.      Tenderness: There is no abdominal tenderness.   Musculoskeletal:         General: Normal range of motion.      Cervical back: Normal range of motion. No rigidity.      Right lower leg: No edema.      Left lower leg: No edema.   Skin:     General: Skin is warm and dry.      Findings: Lesion (Excoriation marks noted on arms and legs) present.   Neurological:      General: No focal deficit present.      Mental Status: She is alert and oriented to person, place, and time.   Psychiatric:         Mood and Affect: Mood normal.         Behavior: Behavior normal.     Dialysis access: Right IJ permacath.  Left arm AV graft without bruit or thrill    Fluids  No intake or output data in the 24 hours ending 01/30/25 1458      Laboratory  Labs reviewed, pertinent labs below.        Recent Labs     01/28/25  0241 01/30/25  1237   SODIUM 136 138   POTASSIUM 5.0 4.6   CHLORIDE 106 102   CO2 18* 24   GLUCOSE 97 128*   BUN 84* 55*   CREATININE 3.80* 3.39*   CALCIUM 8.0* 8.1*               URINALYSIS:  Lab Results   Component Value Date/Time    COLORURINE DK Yellow 02/28/2024 0544    CLARITY Clear 02/28/2024 0544    SPECGRAVITY 1.021 02/28/2024 0544    PHURINE 5.5 02/28/2024 0544    KETONES Negative 02/28/2024 0544    PROTEINURIN 300 (A) 02/28/2024 0544    BILIRUBINUR Small (A) 02/28/2024 0544    UROBILU 1.0 02/28/2024 0544    NITRITE Negative 02/28/2024 0544    LEUKESTERAS Negative 02/28/2024 0544    OCCULTBLOOD Trace (A) 02/28/2024 0544     UPC  Lab Results   Component Value Date/Time    TOTPROTUR 557.0 (H) 02/28/2024 0544      Lab Results   Component Value Date/Time    CREATININEU 82.41 02/28/2024 0544         Imaging interpreted by radiologist. Imaging reports reviewed with pertinent findings below  US-EXTREMITY VENOUS UPPER UNILAT LEFT   Final Result      EC-ECHOCARDIOGRAM COMPLETE W/O CONT   Final  Result      MR-CERVICAL SPINE-WITH & W/O   Final Result         1.  Postoperative changes in the cervical spine as described above.      2.  Abnormal signal is noted in the prevertebral soft tissues extending from the mid C2 to the C7-T1 level. Abnormal marrow signal is noted at the C7-T1 level with mild enhancement of the intervertebral disc space at this level. These findings are    concerning for discitis-osteomyelitis.      3.  There is moderate spinal canal stenosis at C6-7.         US-RUQ   Final Result      1.  Prior cholecystectomy.      2.  Common bile duct diameter measured at 12 mm with some intrahepatic biliary ductal dilatation. This may be related to presence of prior cholecystectomy.      3.  The liver is heterogeneous consistent with fatty change versus hepatocellular dysfunction.      DX-CHEST-PORTABLE (1 VIEW)   Final Result         1.  No acute cardiopulmonary disease.   2.  Atherosclerosis      EC-RAFIQ W/O CONT    (Results Pending)         Current Facility-Administered Medications   Medication Dose Route Frequency Provider Last Rate Last Admin    [MAR Hold] pregabalin (Lyrica) capsule 25 mg  25 mg Oral Q EVENING Annie Esposito D.O.        lactated ringers infusion   Intravenous Continuous Minesh Smart M.D.        ondansetron (Zofran) syringe/vial injection 4 mg  4 mg Intravenous Once PRN Minesh Smart M.D.        haloperidol lactate (Haldol) injection 1 mg  1 mg Intravenous Q15 MIN PRN Minesh Smart M.D.        diphenhydrAMINE (Benadryl) injection 12.5 mg  12.5 mg Intravenous Q15 MIN PRN Minesh Smart M.D.        ePHEDrine injection 5 mg  5 mg Intravenous Q5 MIN PRN Minesh Smart M.D.        labetalol (Normodyne/Trandate) injection 5 mg  5 mg Intravenous Q5 MIN PRN Minesh Smart M.D.        hydrALAZINE (Apresoline) injection 5 mg  5 mg Intravenous Q5 MIN PRN Minesh Smart M.D.        albuterol (Proventil) 2.5mg/0.5ml nebulizer solution 2.5 mg  2.5 mg Inhalation Q10  MIN PRN Minesh Smart M.D.        [MAR Hold] DAPTOmycin (Cubicin) 700 mg in NS 50 mL IVPB  12 mg/kg Intravenous Q48HRS Payton Han M.D.        [MAR Hold] oxyCODONE immediate release (Roxicodone) tablet 10 mg  10 mg Oral Q3HRS PRN Annie Esposito D.O.        Or    [MAR Hold] oxycodone (Oxy-IR) immediate release tablet 15 mg  15 mg Oral Q3HRS PRN Annie Esposito D.O.   15 mg at 01/30/25 1301    Or    [MAR Hold] HYDROmorphone (Dilaudid) injection 0.5 mg  0.5 mg Intravenous Q3HRS PRN Annie Esposito, D.O.   0.5 mg at 01/30/25 1112    [MAR Hold] hydrOXYzine HCl (Atarax) tablet 25 mg  25 mg Oral 4X/DAY PRN Annie Esposito D.O.        [MAR Hold] heparin intracatheter (for DIALYSIS USE ONLY) 1,800 Units  1,800 Units Intracatheter DIALYSIS PRN Ankit Diggs M.D.   1,800 Units at 01/28/25 1240    [MAR Hold] heparin intracatheter (for DIALYSIS USE ONLY) 1,800 Units  1,800 Units Intracatheter DIALYSIS PRN Ankit Diggs M.D.   1,800 Units at 01/28/25 1240    [MAR Hold] acetaminophen (Tylenol) tablet 500 mg  500 mg Oral Q6HRS Annie Esposito D.O.   500 mg at 01/30/25 1233    [MAR Hold] acetaminophen (Tylenol) tablet 500 mg  500 mg Oral Q6HRS PRN Annie Esposito, D.O.        [MAR Hold] torsemide (Demadex) tablet 100 mg  100 mg Oral Q DAY Ankit Diggs M.D.   100 mg at 01/30/25 0959    [MAR Hold] NS (Bolus) 0.9 % infusion 100 mL  100 mL Intravenous DIALYSIS PRN Ankit Diggs M.D.        [MAR Hold] methocarbamol (Robaxin) tablet 750 mg  750 mg Oral TID Tomi Rose, D.O.   750 mg at 01/30/25 1233    [MAR Hold] petrolatum 42 % ointment   Topical BID Dalton Fowler M.D.   Given at 01/30/25 0513    [MAR Hold] insulin GLARGINE (Lantus,Semglee) injection  15 Units Subcutaneous Q EVENING Dalton Fowler M.D.   15 Units at 01/29/25 1741    [MAR Hold] metoprolol SR (Toprol XL) tablet 50 mg  50 mg Oral Q EVENING Jigar Cabrales M.D.   50 mg at 01/29/25 1736    [MAR Hold] amLODIPine (Norvasc) tablet 10 mg  10 mg Oral DAILY Cr Sánchez,  M.D.   10 mg at 01/30/25 0512    [MAR Hold] DULoxetine (Cymbalta) capsule 30 mg  30 mg Oral DAILY Cr Sánchez M.D.   30 mg at 01/30/25 0511    [MAR Hold] levothyroxine (Synthroid) tablet 250 mcg  250 mcg Oral AM ES Cr Sánchez M.D.   250 mcg at 01/30/25 0512    [MAR Hold] traZODone (Desyrel) tablet 150 mg  150 mg Oral QHS Cr Sánchez M.D.   150 mg at 01/29/25 2038    [MAR Hold] venlafaxine XR (Effexor XR) capsule 150 mg  150 mg Oral DAILY Cr Sánchez M.D.   150 mg at 01/30/25 0512    [MAR Hold] heparin injection 5,000 Units  5,000 Units Subcutaneous Q8HRS Annie Esposito D.O.   5,000 Units at 01/30/25 0513    [MAR Hold] senna-docusate (Pericolace Or Senokot S) 8.6-50 MG per tablet 2 Tablet  2 Tablet Oral Q EVENING Cr Sánchez M.D.   2 Tablet at 01/29/25 1736    And    [MAR Hold] polyethylene glycol/lytes (Miralax) Packet 1 Packet  1 Packet Oral QDAY PRN Cr Sánchez M.D.   1 Packet at 01/24/25 0512    [MAR Hold] insulin lispro (HumaLOG,AdmeLOG) subcutaneous injection  2-9 Units Subcutaneous 4X/DAY ACHS Cr Sánchez M.D.   5 Units at 01/29/25 1742    And    [MAR Hold] dextrose 50% (D50W) injection 25 g  25 g Intravenous Q15 MIN PRN Cr Sánchez M.D.   25 g at 01/30/25 0934    [MAR Hold] Pharmacy Consult Request ...Pain Management Review 1 Each  1 Each Other PHARMACY TO DOSE MARK Castillo             Assessment/Plan  69 y.o. female admitted 1/22/2025 with ESRD, bilateral arm weakness    1.  ESRD previously on hemodialysis Monday Wednesday Friday.  Nonoliguric, with enough residual kidney function to do dialysis only twice a week.  Continue dialysis on Mondays and Fridays only, next planned dialysis Friday, 1/31/2025.  Check daily weights.  Avoid NSAIDs and other nephrotoxins as she still urinates.  Check renal function panel at least 3 times per week on Mondays Wednesdays and Fridays.    2.  Dialysis access: Right IJ permacath.  Left arm AV graft has clotted.  There is no need for  "left arm precautions.  Per infectious disease, there is no need for CVC removal with Enterococcus faecium bacteremia.  However, if the diagnosis of endocarditis changes whether or not she needs her dialysis catheter removed, I would prefer removing her dialysis catheter after dialysis treatment.    3.  Enterococcus faecalis bacteremia, endocarditis, cervical MRI concerning for cervical spine osteomyelitis.    Patient will need a long course of IV antibiotics per infectious disease, will await infectious disease and pharmacy recommendations to see if antibiotics can be done twice weekly with hemodialysis.    4.  Anemia of chronic disease.  Stable.  There is no acute need for ANDREINA's with hemoglobin over 10.  Check CBC at least 3 times per week.    5.  Hypertension.  Fairly well-controlled.  Will continue ultrafiltration as tolerated by blood pressure, as high blood pressure usually improves with ultrafiltration with dialysis.  I recommend a low-sodium diet.       6.  Hyperphosphatemia, fairly well-controlled.  I recommend a low phosphorus diet.  No acute need for phosphorus binders.   Check \"renal function panel\" at least on Mondays Wednesdays and Fridays (includes phosphorus level).     Discussed with Dr. Annie Diggs MD  Nephrology  [facility]          "

## 2025-01-30 NOTE — CASE COMMUNICATION
Patient was discussed in IDT today due to concerns regarding patient care plan. Discussed plans for discharge with the treatment team. The treatment team currently involves the following disciplines: Home health aide, MSW, nursing, OT and PT. Plan is to discharge from home health services once admitted to  HOSPICE. CARE

## 2025-01-30 NOTE — ANESTHESIA PREPROCEDURE EVALUATION
Date/Time: 01/30/25 1400    Scheduled providers: Effie Vanegas M.D.; Minesh Smart M.D.    Procedure: EC-RAFIQ W/O CONT    Diagnosis:       Radiculopathy affecting upper extremity [M54.10]      Intractable back pain [M54.9]      Radiculopathy affecting upper extremity [M54.10]      Intractable back pain [M54.9]    Indications: Bacteremia    Location: [facility]            68 y/o admitted with bacteremia, found to have vegetations on TTE, here for RAFIQ.      Relevant Problems   PULMONARY   (positive) Chronic obstructive pulmonary disease      CARDIAC   (positive) Aortic atherosclerosis (HCC)   (positive) CAD S/P percutaneous coronary angioplasty   (positive) Primary hypertension      GI   (positive) GERD (gastroesophageal reflux disease)         (positive) Anemia due to chronic kidney disease, on chronic dialysis (HCC)   (positive) CKD (chronic kidney disease) stage 4, GFR 15-29 ml/min (HCC)   (positive) ESRD needing dialysis (HCC)   (positive) Liver failure without hepatic coma (HCC)   (positive) Steatohepatitis      ENDO   (positive) Hypothyroidism, postsurgical   (positive) Type 1 diabetes mellitus with hyperglycemia (HCC)   (positive) Type 2 diabetes mellitus, with long-term current use of insulin (HCC)      Other   (positive) Acute osteomyelitis of cervical spine (HCC)   (positive) DNR (do not resuscitate)   (positive) Elevated LFTs   (positive) Radiculopathy affecting upper extremity   (positive) Tobacco abuse       Physical Exam    Airway   Mallampati: II  TM distance: >3 FB  Neck ROM: full       Cardiovascular - normal exam  Rhythm: regular  Rate: normal  (-) murmur     Dental - normal exam           Pulmonary - normal exam  Breath sounds clear to auscultation     Abdominal    Neurological - normal exam                   Anesthesia Plan    ASA 3       Plan - general and MAC       Airway plan will be natural airway          Induction:  intravenous    Postoperative Plan: Postoperative administration of opioids is intended.    Pertinent diagnostic labs and testing reviewed    Informed Consent:    Anesthetic plan and risks discussed with patient.    Use of blood products discussed with: patient whom consented to blood products.

## 2025-01-30 NOTE — PROGRESS NOTES
Report received from NOC RN and assumed patient care at 0700. Patient is A&Ox 4, on RA, and bed alarm is on . Patient reporting a pain level of 5/10. Pt declined intervention at this time. Pt Bg was 40. Initiated hypoglycemia protocol. Administered D50W. Notified MD and pharmacist. Pt denied hypoglycemic s/s although pt reported she felt dizzy when she went to the bathroom this AM. Bed alarm on. Call light within reach and bed in lowest position. Reinforced the need to call for assistance. Plan of care discussed and patient does not have any further needs at this time.

## 2025-01-30 NOTE — PROGRESS NOTES
Infectious Disease Progress Note    Author: Payton Han M.D. Date & Time of service: 2025  12:50 PM    Chief Complaint:  vertebral osteo  Efaecium bacteremia    Interval History:  69 y.o. diabetic female originally  admitted 2025 worsening back pain and arm pain   AF GPC E faecium Plan of care reviewed with patient   AF crying pain-had dextrose infusion that arm  Labs Reviewed and Medications Reviewed.    Review of Systems:  Review of Systems   Constitutional:  Negative for fever.   Musculoskeletal:  Positive for joint pain and neck pain.       Hemodynamics:  Temp (24hrs), Av.2 °C (97.1 °F), Min:35.9 °C (96.7 °F), Max:36.7 °C (98 °F)  Temperature: 36.7 °C (98 °F)  Pulse  Av.6  Min: 54  Max: 90   Blood Pressure : 127/65       Physical Exam:  Physical Exam  Vitals and nursing note reviewed.   Constitutional:       General: She is not in acute distress.     Appearance: She is not ill-appearing, toxic-appearing or diaphoretic.   Eyes:      General: No scleral icterus.     Extraocular Movements: Extraocular movements intact.      Pupils: Pupils are equal, round, and reactive to light.   Cardiovascular:      Rate and Rhythm: Normal rate.   Pulmonary:      Effort: Pulmonary effort is normal. No respiratory distress.   Abdominal:      General: There is no distension.   Musculoskeletal:      Cervical back: Neck supple.      Comments: Cord LUE   Skin:     Coloration: Skin is not jaundiced.   Neurological:      General: No focal deficit present.      Mental Status: She is alert and oriented to person, place, and time.         Meds:    Current Facility-Administered Medications:     pregabalin    [START ON 2025] DAPTOmycin    oxyCODONE immediate-release **OR** oxyCODONE immediate-release **OR** HYDROmorphone    hydrOXYzine HCl    heparin    heparin    acetaminophen    acetaminophen    torsemide    NS    methocarbamol    petrolatum    insulin GLARGINE    metoprolol SR    amLODIPine     "DULoxetine    levothyroxine    traZODone    venlafaxine XR    heparin    senna-docusate **AND** polyethylene glycol/lytes    insulin lispro **AND** POC blood glucose manual result **AND** NOTIFY MD and PharmD **AND** Administer 20 grams of glucose (approximately 8 ounces of fruit juice) every 15 minutes PRN FSBG less than 70 mg/dL **AND** dextrose bolus    Pharmacy Consult Request    Labs:  No results for input(s): \"WBC\", \"RBC\", \"HEMOGLOBIN\", \"HEMATOCRIT\", \"MCV\", \"MCH\", \"RDW\", \"PLATELETCT\", \"MPV\", \"NEUTSPOLYS\", \"LYMPHOCYTES\", \"MONOCYTES\", \"EOSINOPHILS\", \"BASOPHILS\", \"RBCMORPHOLO\" in the last 72 hours.  Recent Labs     01/28/25  0241   SODIUM 136   POTASSIUM 5.0   CHLORIDE 106   CO2 18*   GLUCOSE 97   BUN 84*     Recent Labs     01/28/25  0241   CREATININE 3.80*       Imaging:  MR-CERVICAL SPINE-WITH & W/O    Result Date: 1/27/2025 1/27/2025 4:19 PM HISTORY/REASON FOR EXAM:  ARM PAIN; she is a dialysis patient and will get dialysis after the scan. TECHNIQUE/EXAM DESCRIPTION: MRI of the cervical spine without and with contrast. The study was performed on a [employer] Signa 1.5 Twyla MRI scanner. T1 sagittal, T2 fast spin-echo sagittal, T1 postcontrast fat-suppressed sagittal, and gradient-echo axial images were obtained of the cervical spine. 12 mL ProHance contrast were administered  intravenously. COMPARISON:  CT dated 1/10/2025 FINDINGS:  Postoperative changes are noted with cervical fusion across C4-C7. Minimal prevertebral soft tissue edema is noted. Increased signal is noted on STIR imaging within the adjacent endplates at the C6-C7 level. Motion artifact limits evaluation. Spinal cord signal is grossly within normal limits though subtle abnormality cannot be identified due to motion artifact. Included portion of the posterior fossa is normal. Findings specific to each level are described below: C2-3: Endplate disc osteophyte complex is present causing mild canal narrowing. There is probably mild left foraminal " narrowing. C3-4:  Postoperative changes noted with a well decompressed spinal canal. Neuroforamina are normal. C4-5:  Spinal canal is well decompressed. There is no significant canal narrowing. C5-6: Spinal canal is well decompressed. C6-7: Endplate discussed by complex at C6-C7 results in moderate canal narrowing with slight cord deformity. There is also moderate bilateral foraminal narrowing at this level. C7-T1: No significant abnormality. Mild enhancement of the intervertebral disc space at C6-C7 and minimal enhancement anteriorly within the prevertebral soft tissues. Linear enhancement also noted extending inferiorly along the right paracentral aspect in the prevertebral region at the C7-T1 level. No definite abnormal epidural enhancement is seen.     1.  Postoperative changes in the cervical spine as described above. 2.  Abnormal signal is noted in the prevertebral soft tissues extending from the mid C2 to the C7-T1 level. Abnormal marrow signal is noted at the C7-T1 level with mild enhancement of the intervertebral disc space at this level. These findings are concerning for discitis-osteomyelitis. 3.  There is moderate spinal canal stenosis at C6-7.     US-RUQ    Result Date: 1/22/2025 1/22/2025 11:02 AM HISTORY/REASON FOR EXAM: Right upper quadrant abdominal pain. TECHNIQUE/EXAM DESCRIPTION: Ultrasound of the gallbladder, liver and biliary tree. COMPARISON: None FINDINGS: The liver echogenicity is heterogeneous. There is no evidence of hepatic mass. The gallbladder is surgically absent. The common bile duct is measured at 12 mm. There is some intrahepatic biliary ductal dilatation. The visualized portions of the portal vein and inferior vena cava are normal. The pancreas is normal. The right kidney is normal.     1.  Prior cholecystectomy. 2.  Common bile duct diameter measured at 12 mm with some intrahepatic biliary ductal dilatation. This may be related to presence of prior cholecystectomy. 3.  The liver  is heterogeneous consistent with fatty change versus hepatocellular dysfunction.    DX-CHEST-PORTABLE (1 VIEW)    Result Date: 1/22/2025 1/22/2025 5:43 AM HISTORY/REASON FOR EXAM: Chest Pain TECHNIQUE/EXAM DESCRIPTION:  Single AP view of the chest. COMPARISON: January 6, 2025 FINDINGS: Position of medical devices appears stable. The cardiac silhouette appears within normal limits. Atherosclerotic calcification of the aorta is noted.  The central pulmonary vasculature appears normal. Asymmetric elevation of the right hemidiaphragm is noted.  Bilateral lungs are clear. No significant pleural effusions are identified. The bony structures appear age-appropriate.     1.  No acute cardiopulmonary disease. 2.  Atherosclerosis    CT-TSPINE W/O PLUS RECONS    Result Date: 1/10/2025  1/10/2025 9:54 AM HISTORY/REASON FOR EXAM:  SYNCOPE; BACK PAIN; ARM PAIN. TECHNIQUE/EXAM DESCRIPTION AND NUMBER OF VIEWS: CT thoracic spine without contrast, with reconstructions. The study was performed on a [employer]og[employer] CT scanner. Thin-section helical scanning was performed from C7-T1 through T12-L1. Sagittal and coronal multiplanar reconstructions were generated from the axial images. Low dose optimization technique was utilized for this CT exam including automated exposure control and adjustment of the mA and/or kV according to patient size. COMPARISON: None. FINDINGS: Nomenclature: C7-T1 is axial image 26 of 254. Alignment: Moderate leftward curvature centered at T9-10. Minimal anterolisthesis of C7 on T1. Vertebrae: No acute fracture. No aggressive osseous lesions. Degenerative loss of intervertebral disc space at several levels throughout the mid thoracic spine. Endplate sclerosis at T9-10 and is unchanged. Spondylosis: No high-grade canal or foraminal stenosis. Soft tissues: Trace bilateral pleural effusions.     No acute fracture or traumatic malalignment.    CT-CSPINE WITHOUT PLUS RECONS    Result Date: 1/10/2025  1/10/2025 9:54 AM  HISTORY/REASON FOR EXAM: SYNCOPE; BACK PAIN; ARM PAIN TECHNIQUE/EXAM DESCRIPTION: CT cervical spine without contrast, with reconstructions. The study was performed on a helical multidetector CT scanner. Thin-section helical scanning was performed from the skull base through T1. Sagittal and coronal multiplanar reconstructions were generated from the axial images. Low dose optimization technique was utilized for this CT exam including automated exposure control and adjustment of the mA and/or kV according to patient size. COMPARISON:  CT 3/10/2019, MRI 1/8/2025. FINDINGS: Alignment: Grade 1 anterolisthesis of C2 on C3 and C7 on T1. Minimal retrolisthesis of C3 on C4. C1 and C2 lateral masses are congruent. Vertebrae: Prior interbody and posterior fusion of C4-C7. Hardware creates streak artifact limiting evaluation of vertebral bodies and posterior elements at the surgical levels. No evidence of lucency surrounding the hardware. There is sclerosis of the C7-T1 endplates as before, with increased destruction of the intervening disc space. No acute fracture. No aggressive osseous lesion. Spondylosis: No high-grade osseous canal or foraminal stenosis. Soft tissues: No prevertebral soft tissue swelling. Visualized lung apices are clear. Other: Visualized intracranial contents are unremarkable.     Prior interbody and posterior fusion of C4-C7. Sclerosis of the C7-T1 endplates as before, with increased destruction of the intervening disc space since 2019. Please correlate clinically for discitis-osteomyelitis.    CT-HEAD W/O    Result Date: 1/10/2025  1/10/2025 9:54 AM HISTORY/REASON FOR EXAM: R42  Dizziness TECHNIQUE/EXAM DESCRIPTION AND NUMBER OF VIEWS: CT of the head without contrast. The study was performed on a helical multidetector CT scanner. Contiguous 2.5 mm axial sections were obtained from the skull base through the vertex. Up to date radiation dose reduction adjustments have been utilized to meet ALARA  standards for radiation dose reduction. COMPARISON:  4/1/2024 FINDINGS: There is no evidence of acute intracranial hemorrhage, mass, mass-effect or shift of midline structures. Gray-white matter differentiation is grossly preserved. Ventricle size and brain parenchymal volume are appropriate for this patient's stated age. The basal cisterns are patent. There are no abnormal extra-axial fluid collections. No depressed or widely  calvarial fracture is seen. The visualized paranasal sinuses and temporal bone structures are aerated. ___________________________________     1. No acute intracranial abnormality. No evidence of acute intracranial hemorrhage or mass lesion.     MR-THORACIC SPINE-WITH & W/O    Result Date: 1/8/2025 1/8/2025 8:02 AM HISTORY/REASON FOR EXAM:  BACK PAIN BACK PAIN - Pt states for a long time she has had pain between her scapula, has been seen for it. States pain is back and worse tonight. TECHNIQUE/EXAM DESCRIPTION: MRI of the thoracic spine without and with contrast. The study was performed on a [employer] Signa 1.5 Twyla MRI scanner. T1 sagittal, T2 fast spin-echo sagittal, and T2 axial images were obtained of the thoracic spine. T1 post-contrast fat suppressed sagittal images were obtained. Optional T1 post-contrast axial images may be obtained. 10 mL ProHance contrast was administered intravenously. COMPARISON:  CT thoracic spine 12/22/2024 FINDINGS: Compensatory levocurvature of the thoracic spine noted with a rightward curvature of the lumbar spine. Spinal cord signal intensity is within normal limits. The conus is identified at the T12-L1 level. Type II marrow changes are noted in the adjacent T9 and T10 endplates. At T9-10, endplate degenerative changes noted with a posterior disc osteophyte complex mildly encroaching upon the spinal canal. There is also bilateral advanced facet degeneration at this level. These degenerative changes result in mild canal narrowing at this level. The  remaining thoracic levels do not demonstrate any evidence of significant canal or foraminal stenosis. Postcontrast images through the thoracic spine do not demonstrate any abnormal enhancement.     Mild degenerative changes at T9-10. Otherwise, no significant abnormality is identified in the thoracic spine.    MR-CERVICAL SPINE-WITH & W/O    Result Date: 1/8/2025 1/8/2025 8:02 AM HISTORY/REASON FOR EXAM:  BACK PAIN TECHNIQUE/EXAM DESCRIPTION: MRI of the cervical spine without and with contrast. The study was performed on a [employer] Signa 1.5 Twyla MRI scanner. T1 sagittal, T2 fast spin-echo sagittal, T1 postcontrast fat-suppressed sagittal, and gradient-echo axial images were obtained of the cervical spine. 10 mL ProHance contrast were administered  intravenously. COMPARISON:  3/10/2019, MRI dated 9/1/2016. FINDINGS:  C4-C7 posterior lateral mass fusion noted. Abnormal marrow signal is noted involving the adjacent endplates at C6-C7 with increased signal on STIR imaging and low signal on T1 imaging, more marked in C7. Motion artifact significantly limits evaluation. Included portion of the posterior fossa is normal. Spinal cord signal is grossly normal. Findings specific to each level are described below: . C2-3: Normal C3-4:  Endplate disc osteophyte complex with bilateral uncovertebral degeneration. There is asymmetric mild left-sided canal narrowing due to left predominant uncovertebral degeneration. There is moderate left foraminal narrowing. C4-5: Endplate disc osteophyte complex with bilateral uncovertebral degeneration but no significant canal or foraminal stenosis. C5-6: Postoperative changes noted at this level. There is no significant canal stenosis. Neural foramina are difficult to assess due to artifact from the lateral mass screws. C6-7: Endplate disc osteophyte complex moderately encroaches upon the spinal canal causing moderate canal narrowing. There is bilateral uncovertebral degeneration with moderate  right foraminal narrowing. C7-T1: Minimal endplate disc osteophyte complex without significant encroachment upon the spinal canal or neural foramina. T1-2: Normal. No definite abnormal epidural enhancement is identified in cervical spine. There is mild enhancement of the bone marrow of the adjacent endplates at C6-C7 T2-weighted images demonstrate minimal high signal in the prevertebral space at the C6-C7 level. However, there is no definite associated abnormal enhancement in this location. Prevertebral soft tissues are within normal limits.     1.  Postoperative changes noted in the cervical spine with posterior lateral mass fusion between C4 and C7. 2.  Abnormal bone marrow signal abnormal enhancement identified at the C6-C7 level involving the adjacent endplates and the entirety of the C7 vertebral body. Slightly increased T2 signal is also noted within the disc space at this level with minimal enhancement. However, this area did demonstrate a sclerotic appearance on previous CT. Minimal abnormal signal is present also within the prevertebral soft tissues at this level. A new infectious process cannot be completely excluded. Recommend correlation with history, physical exam and consider short-term follow-up imaging. 3.  Moderate canal stenosis is noted at C6-C7.    DX-CHEST-PORTABLE (1 VIEW)    Result Date: 1/6/2025 1/6/2025 12:55 PM HISTORY/REASON FOR EXAM:  Chest Pain. TECHNIQUE/EXAM DESCRIPTION AND NUMBER OF VIEWS: Single portable view of the chest. COMPARISON: 12/31/2024 FINDINGS: Stable right IJ central venous catheter. Cardiomediastinal silhouette is stable. Aortic calcified atherosclerotic plaque. Coronary artery stent. No focal consolidation, pleural effusion, pulmonary edema or pneumothorax. No acute osseous abnormality.     No acute cardiopulmonary abnormality.    DX-CHEST-PORTABLE (1 VIEW)    Result Date: 12/31/2024 12/31/2024 3:32 AM HISTORY/REASON FOR EXAM:  Chest Pain. TECHNIQUE/EXAM DESCRIPTION  AND NUMBER OF VIEWS: Single portable view of the chest. COMPARISON: 12/21/2024 FINDINGS: Cardiac silhouette is normal in size. No airspace infiltrate, pleural effusion, or pneumothorax. Dialysis catheter tip overlies the right ventricle. Scoliosis.     No acute process.      Micro:  Results       Procedure Component Value Units Date/Time    BLOOD CULTURE [[id number]]  (Abnormal) Collected: 01/27/25 1859    Order Status: Completed Specimen: Blood from Peripheral Updated: 01/30/25 1153     Significant Indicator POS     Source BLD     Site PERIPHERAL     Culture Result Growth detected by automated blood culture system.  01/28/2025  12:13        Enterococcus faecium  Susceptibilities in progress        Staphylococcus epidermidis  POSSIBLE CONTAMINANT: Isolated from one set only, please  correlate with clinical condition. Contact the Microbiology  department within 48 hr if susceptibility testing is needed.      BLOOD CULTURE [[id number]] Collected: 01/29/25 1828    Order Status: Completed Specimen: Blood from Peripheral Updated: 01/29/25 2008     Significant Indicator NEG     Source BLD     Site PERIPHERAL     Culture Result No Growth  Note: Blood cultures are incubated for 5 days and  are monitored continuously.Positive blood cultures  are called to the RN and reported as soon as  they are identified.      BLOOD CULTURE [[id number]] Collected: 01/29/25 1828    Order Status: Completed Specimen: Blood from Peripheral Updated: 01/29/25 2008     Significant Indicator NEG     Source BLD     Site PERIPHERAL     Culture Result No Growth  Note: Blood cultures are incubated for 5 days and  are monitored continuously.Positive blood cultures  are called to the RN and reported as soon as  they are identified.      BLOOD CULTURE [[id number]] Collected: 01/28/25 0241    Order Status: Completed Specimen: Blood from Peripheral Updated: 01/28/25 0608     Significant Indicator NEG     Source BLD     Site PERIPHERAL     Culture Result No  Growth  Note: Blood cultures are incubated for 5 days and  are monitored continuously.Positive blood cultures  are called to the RN and reported as soon as  they are identified.              Assessment:  Active Hospital Problems    Diagnosis     *Radiculopathy affecting upper extremity [M54.10]     Bacteremia [R78.81]     Acute osteomyelitis of cervical spine ([facility]) [M46.22]     CKD (chronic kidney disease) stage 4, GFR 15-29 ml/min ([facility]) [N18.4]     DNR (do not resuscitate) [Z66]     Type 2 diabetes mellitus, with long-term current use of insulin ([facility]) [E11.9, Z79.4]     Intractable back pain [M54.9]     ESRD needing dialysis ([facility]) [N18.6, Z99.2]     CAD S/P percutaneous coronary angioplasty [I25.10, Z98.61]     Primary hypertension [I10]      Abnormal MRI C spine  Suspected vertebral osteomyelitis-IR and Neurosurgery declined biopsy  Blood cult Efaecium   Endocarditis-TTE with vegetations TV and MV          PLAN:   Repeat Bcxs every 48 hours until neg  RAFIQ  due to abnormal TTE  Transition vancomycin to dapto pending susceptibilities    Anticipate 6+ weeks IV abx from date of negative blood cxs     PICC -TBD    Plan of care discussed with Dr Esposito Will continue to follow     Payton Han M.D.

## 2025-01-30 NOTE — ANESTHESIA TIME REPORT
Anesthesia Start and Stop Event Times       Date Time Event    1/30/2025 1330 Ready for Procedure     1358 Anesthesia Start     1428 Anesthesia Stop          Responsible Staff  01/30/25      Name Role Begin End    Minesh Smart M.D. Anesth 1358 1428          Overtime Reason:  no overtime (within assigned shift)    Comments:

## 2025-01-30 NOTE — DISCHARGE PLANNING
HTH/SCP TCN chart review completed.  Current discharge considerations are home with home health. Noted PT recommending home health. No DME (AD) recommendations at this time, as patient owns FWW/4WW/SPC per PT note. Per flowsheet patient is on RA.     TCN will continue to follow and collaborate with discharge planning team as additional post acute needs arise. Thank you.    Completed:  PT recommends home health - 1/29  OT orders in chart   Choice obtained: [facility] (1. [facility] 2. [facility])  Pt aware of [facility] blanket referral policy  Noted [facility] hospice has declined patient  SCP with Non-[facility] PCP.

## 2025-01-30 NOTE — ASSESSMENT & PLAN NOTE
TTE showed possible tricuspid and mitral valve endocarditis  Infectious disease following  Cardiology consulted, RAFIQ 1/30- showed right atrial mass with mobile density   Unclear if this is mass versus clot versus infection  Discussed with IR, CT would not delineate this

## 2025-01-31 VITALS
WEIGHT: 128.75 LBS | TEMPERATURE: 97.4 F | BODY MASS INDEX: 20.69 KG/M2 | SYSTOLIC BLOOD PRESSURE: 147 MMHG | OXYGEN SATURATION: 99 % | DIASTOLIC BLOOD PRESSURE: 82 MMHG | HEART RATE: 81 BPM | HEIGHT: 66 IN | RESPIRATION RATE: 18 BRPM

## 2025-01-31 LAB
ALBUMIN SERPL BCP-MCNC: 2.8 G/DL (ref 3.2–4.9)
ANION GAP SERPL CALC-SCNC: 17 MMOL/L (ref 7–16)
BACTERIA BLD CULT: ABNORMAL
BUN SERPL-MCNC: 55 MG/DL (ref 8–22)
CALCIUM ALBUM COR SERPL-MCNC: 8.4 MG/DL (ref 8.5–10.5)
CALCIUM SERPL-MCNC: 7.4 MG/DL (ref 8.5–10.5)
CHLORIDE SERPL-SCNC: 100 MMOL/L (ref 96–112)
CO2 SERPL-SCNC: 19 MMOL/L (ref 20–33)
CREAT SERPL-MCNC: 3.68 MG/DL (ref 0.5–1.4)
ERYTHROCYTE [DISTWIDTH] IN BLOOD BY AUTOMATED COUNT: 63 FL (ref 35.9–50)
GFR SERPLBLD CREATININE-BSD FMLA CKD-EPI: 13 ML/MIN/1.73 M 2
GLUCOSE BLD STRIP.AUTO-MCNC: 138 MG/DL (ref 65–99)
GLUCOSE BLD STRIP.AUTO-MCNC: 141 MG/DL (ref 65–99)
GLUCOSE BLD STRIP.AUTO-MCNC: 420 MG/DL (ref 65–99)
GLUCOSE SERPL-MCNC: 121 MG/DL (ref 65–99)
HCT VFR BLD AUTO: 31.4 % (ref 37–47)
HGB BLD-MCNC: 9.7 G/DL (ref 12–16)
MCH RBC QN AUTO: 31.4 PG (ref 27–33)
MCHC RBC AUTO-ENTMCNC: 30.9 G/DL (ref 32.2–35.5)
MCV RBC AUTO: 101.6 FL (ref 81.4–97.8)
PHOSPHATE SERPL-MCNC: 4.5 MG/DL (ref 2.5–4.5)
PLATELET # BLD AUTO: 151 K/UL (ref 164–446)
PMV BLD AUTO: 10.6 FL (ref 9–12.9)
POTASSIUM SERPL-SCNC: 4.2 MMOL/L (ref 3.6–5.5)
RBC # BLD AUTO: 3.09 M/UL (ref 4.2–5.4)
SIGNIFICANT IND 70042: ABNORMAL
SITE SITE: ABNORMAL
SODIUM SERPL-SCNC: 136 MMOL/L (ref 135–145)
SOURCE SOURCE: ABNORMAL
VANCOMYCIN SERPL-MCNC: 10.1 UG/ML
WBC # BLD AUTO: 7.2 K/UL (ref 4.8–10.8)

## 2025-01-31 PROCEDURE — 700102 HCHG RX REV CODE 250 W/ 637 OVERRIDE(OP): Performed by: INTERNAL MEDICINE

## 2025-01-31 PROCEDURE — 36415 COLL VENOUS BLD VENIPUNCTURE: CPT

## 2025-01-31 PROCEDURE — 90935 HEMODIALYSIS ONE EVALUATION: CPT | Performed by: INTERNAL MEDICINE

## 2025-01-31 PROCEDURE — 85027 COMPLETE CBC AUTOMATED: CPT

## 2025-01-31 PROCEDURE — 700117 HCHG RX CONTRAST REV CODE 255: Mod: JZ | Performed by: INTERNAL MEDICINE

## 2025-01-31 PROCEDURE — 80069 RENAL FUNCTION PANEL: CPT

## 2025-01-31 PROCEDURE — A9579 GAD-BASE MR CONTRAST NOS,1ML: HCPCS | Mod: JZ | Performed by: INTERNAL MEDICINE

## 2025-01-31 PROCEDURE — A9270 NON-COVERED ITEM OR SERVICE: HCPCS | Performed by: STUDENT IN AN ORGANIZED HEALTH CARE EDUCATION/TRAINING PROGRAM

## 2025-01-31 PROCEDURE — 700102 HCHG RX REV CODE 250 W/ 637 OVERRIDE(OP): Performed by: NEUROLOGICAL SURGERY

## 2025-01-31 PROCEDURE — 80202 ASSAY OF VANCOMYCIN: CPT

## 2025-01-31 PROCEDURE — 90935 HEMODIALYSIS ONE EVALUATION: CPT

## 2025-01-31 PROCEDURE — 700102 HCHG RX REV CODE 250 W/ 637 OVERRIDE(OP): Performed by: STUDENT IN AN ORGANIZED HEALTH CARE EDUCATION/TRAINING PROGRAM

## 2025-01-31 PROCEDURE — A9270 NON-COVERED ITEM OR SERVICE: HCPCS | Performed by: INTERNAL MEDICINE

## 2025-01-31 PROCEDURE — 700101 HCHG RX REV CODE 250: Performed by: INTERNAL MEDICINE

## 2025-01-31 PROCEDURE — 700111 HCHG RX REV CODE 636 W/ 250 OVERRIDE (IP): Performed by: INTERNAL MEDICINE

## 2025-01-31 PROCEDURE — A9270 NON-COVERED ITEM OR SERVICE: HCPCS | Performed by: NEUROLOGICAL SURGERY

## 2025-01-31 PROCEDURE — 99233 SBSQ HOSP IP/OBS HIGH 50: CPT | Performed by: INTERNAL MEDICINE

## 2025-01-31 PROCEDURE — 99221 1ST HOSP IP/OBS SF/LOW 40: CPT | Performed by: STUDENT IN AN ORGANIZED HEALTH CARE EDUCATION/TRAINING PROGRAM

## 2025-01-31 PROCEDURE — 700102 HCHG RX REV CODE 250 W/ 637 OVERRIDE(OP): Performed by: HOSPITALIST

## 2025-01-31 PROCEDURE — A9270 NON-COVERED ITEM OR SERVICE: HCPCS | Performed by: HOSPITALIST

## 2025-01-31 PROCEDURE — 82962 GLUCOSE BLOOD TEST: CPT

## 2025-01-31 PROCEDURE — 770006 HCHG ROOM/CARE - MED/SURG/GYN SEMI*

## 2025-01-31 PROCEDURE — 700105 HCHG RX REV CODE 258: Performed by: INTERNAL MEDICINE

## 2025-01-31 RX ORDER — BUSPIRONE HYDROCHLORIDE 10 MG/1
5 TABLET ORAL 2 TIMES DAILY
Status: DISCONTINUED | OUTPATIENT
Start: 2025-01-31 | End: 2025-02-05 | Stop reason: HOSPADM

## 2025-01-31 RX ORDER — OXYCODONE HCL 10 MG/1
10 TABLET, FILM COATED, EXTENDED RELEASE ORAL EVERY 12 HOURS
Status: DISCONTINUED | OUTPATIENT
Start: 2025-01-31 | End: 2025-02-05 | Stop reason: HOSPADM

## 2025-01-31 RX ORDER — LIDOCAINE 4 G/G
3 PATCH TOPICAL EVERY 24 HOURS
Status: DISCONTINUED | OUTPATIENT
Start: 2025-01-31 | End: 2025-02-05 | Stop reason: HOSPADM

## 2025-01-31 RX ORDER — HYDROXYZINE HYDROCHLORIDE 50 MG/1
25 TABLET, FILM COATED ORAL EVERY 6 HOURS PRN
Status: DISCONTINUED | OUTPATIENT
Start: 2025-01-31 | End: 2025-02-05 | Stop reason: HOSPADM

## 2025-01-31 RX ORDER — ATORVASTATIN CALCIUM 40 MG/1
40 TABLET, FILM COATED ORAL EVERY EVENING
Status: DISCONTINUED | OUTPATIENT
Start: 2025-01-31 | End: 2025-02-05 | Stop reason: HOSPADM

## 2025-01-31 RX ADMIN — INSULIN GLARGINE-YFGN 8 UNITS: 100 INJECTION, SOLUTION SUBCUTANEOUS at 17:18

## 2025-01-31 RX ADMIN — HEPARIN SODIUM 5000 UNITS: 5000 INJECTION, SOLUTION INTRAVENOUS; SUBCUTANEOUS at 22:04

## 2025-01-31 RX ADMIN — LIDOCAINE 3 PATCH: 4 PATCH TOPICAL at 22:00

## 2025-01-31 RX ADMIN — VANCOMYCIN HYDROCHLORIDE 1500 MG: 5 INJECTION, POWDER, LYOPHILIZED, FOR SOLUTION INTRAVENOUS at 18:20

## 2025-01-31 RX ADMIN — OXYCODONE HYDROCHLORIDE 15 MG: 15 TABLET ORAL at 10:58

## 2025-01-31 RX ADMIN — LEVOTHYROXINE SODIUM 250 MCG: 0.12 TABLET ORAL at 05:05

## 2025-01-31 RX ADMIN — ACETAMINOPHEN 500 MG: 500 TABLET ORAL at 17:16

## 2025-01-31 RX ADMIN — HEPARIN SODIUM 1800 UNITS: 1000 INJECTION, SOLUTION INTRAVENOUS; SUBCUTANEOUS at 10:35

## 2025-01-31 RX ADMIN — OXYCODONE HYDROCHLORIDE 15 MG: 15 TABLET ORAL at 07:14

## 2025-01-31 RX ADMIN — TORSEMIDE 100 MG: 100 TABLET ORAL at 05:06

## 2025-01-31 RX ADMIN — OXYCODONE HYDROCHLORIDE 15 MG: 15 TABLET ORAL at 14:07

## 2025-01-31 RX ADMIN — OXYCODONE HYDROCHLORIDE 15 MG: 15 TABLET ORAL at 19:15

## 2025-01-31 RX ADMIN — BUSPIRONE HYDROCHLORIDE 5 MG: 10 TABLET ORAL at 17:16

## 2025-01-31 RX ADMIN — PREGABALIN 25 MG: 25 CAPSULE ORAL at 17:18

## 2025-01-31 RX ADMIN — PETROLATUM: 420 OINTMENT TOPICAL at 17:19

## 2025-01-31 RX ADMIN — HYDROMORPHONE HYDROCHLORIDE 0.5 MG: 1 INJECTION, SOLUTION INTRAMUSCULAR; INTRAVENOUS; SUBCUTANEOUS at 15:08

## 2025-01-31 RX ADMIN — DULOXETINE 30 MG: 30 CAPSULE, DELAYED RELEASE ORAL at 05:05

## 2025-01-31 RX ADMIN — OXYCODONE HYDROCHLORIDE 15 MG: 15 TABLET ORAL at 03:25

## 2025-01-31 RX ADMIN — PETROLATUM: 420 OINTMENT TOPICAL at 05:06

## 2025-01-31 RX ADMIN — HEPARIN SODIUM 5000 UNITS: 5000 INJECTION, SOLUTION INTRAVENOUS; SUBCUTANEOUS at 14:08

## 2025-01-31 RX ADMIN — ACETAMINOPHEN 500 MG: 500 TABLET ORAL at 05:06

## 2025-01-31 RX ADMIN — HYDROMORPHONE HYDROCHLORIDE 0.5 MG: 1 INJECTION, SOLUTION INTRAMUSCULAR; INTRAVENOUS; SUBCUTANEOUS at 20:40

## 2025-01-31 RX ADMIN — INSULIN LISPRO 9 UNITS: 100 INJECTION, SOLUTION INTRAVENOUS; SUBCUTANEOUS at 17:18

## 2025-01-31 RX ADMIN — HYDROMORPHONE HYDROCHLORIDE 0.5 MG: 1 INJECTION, SOLUTION INTRAMUSCULAR; INTRAVENOUS; SUBCUTANEOUS at 12:17

## 2025-01-31 RX ADMIN — HYDROXYZINE HYDROCHLORIDE 25 MG: 50 TABLET ORAL at 21:57

## 2025-01-31 RX ADMIN — TRAZODONE HYDROCHLORIDE 150 MG: 50 TABLET ORAL at 21:58

## 2025-01-31 RX ADMIN — METHOCARBAMOL 750 MG: 750 TABLET ORAL at 17:17

## 2025-01-31 RX ADMIN — METHOCARBAMOL 750 MG: 750 TABLET ORAL at 11:30

## 2025-01-31 RX ADMIN — ATORVASTATIN CALCIUM 40 MG: 40 TABLET, FILM COATED ORAL at 17:17

## 2025-01-31 RX ADMIN — HYDROMORPHONE HYDROCHLORIDE 0.5 MG: 1 INJECTION, SOLUTION INTRAMUSCULAR; INTRAVENOUS; SUBCUTANEOUS at 04:31

## 2025-01-31 RX ADMIN — METOPROLOL SUCCINATE 50 MG: 50 TABLET, EXTENDED RELEASE ORAL at 17:18

## 2025-01-31 RX ADMIN — GADOTERIDOL 12 ML: 279.3 INJECTION, SOLUTION INTRAVENOUS at 19:57

## 2025-01-31 RX ADMIN — OXYCODONE HYDROCHLORIDE 10 MG: 10 TABLET, FILM COATED, EXTENDED RELEASE ORAL at 17:18

## 2025-01-31 RX ADMIN — ACETAMINOPHEN 500 MG: 500 TABLET ORAL at 11:30

## 2025-01-31 RX ADMIN — VENLAFAXINE HYDROCHLORIDE 150 MG: 75 CAPSULE, EXTENDED RELEASE ORAL at 05:05

## 2025-01-31 RX ADMIN — HYDROXYZINE HYDROCHLORIDE 25 MG: 50 TABLET ORAL at 15:08

## 2025-01-31 RX ADMIN — HYDROMORPHONE HYDROCHLORIDE 0.5 MG: 1 INJECTION, SOLUTION INTRAMUSCULAR; INTRAVENOUS; SUBCUTANEOUS at 00:46

## 2025-01-31 RX ADMIN — METHOCARBAMOL 750 MG: 750 TABLET ORAL at 05:06

## 2025-01-31 RX ADMIN — HEPARIN SODIUM 5000 UNITS: 5000 INJECTION, SOLUTION INTRAVENOUS; SUBCUTANEOUS at 05:05

## 2025-01-31 ASSESSMENT — COGNITIVE AND FUNCTIONAL STATUS - GENERAL
SUGGESTED CMS G CODE MODIFIER MOBILITY: CH
SUGGESTED CMS G CODE MODIFIER DAILY ACTIVITY: CI
DAILY ACTIVITIY SCORE: 23
MOBILITY SCORE: 24
HELP NEEDED FOR BATHING: A LITTLE

## 2025-01-31 ASSESSMENT — PAIN DESCRIPTION - PAIN TYPE
TYPE: ACUTE PAIN

## 2025-01-31 ASSESSMENT — PATIENT HEALTH QUESTIONNAIRE - PHQ9
SUM OF ALL RESPONSES TO PHQ9 QUESTIONS 1 AND 2: 0
SUM OF ALL RESPONSES TO PHQ9 QUESTIONS 1 AND 2: 0
2. FEELING DOWN, DEPRESSED, IRRITABLE, OR HOPELESS: NOT AT ALL
1. LITTLE INTEREST OR PLEASURE IN DOING THINGS: NOT AT ALL
1. LITTLE INTEREST OR PLEASURE IN DOING THINGS: NOT AT ALL
2. FEELING DOWN, DEPRESSED, IRRITABLE, OR HOPELESS: NOT AT ALL

## 2025-01-31 ASSESSMENT — ENCOUNTER SYMPTOMS
NECK PAIN: 1
FEVER: 0
MYALGIAS: 1
SHORTNESS OF BREATH: 0
NERVOUS/ANXIOUS: 1
WEAKNESS: 1
BACK PAIN: 1
CHILLS: 0
NAUSEA: 0
ABDOMINAL PAIN: 0

## 2025-01-31 NOTE — ANESTHESIA POSTPROCEDURE EVALUATION
Patient: Anu Manuel    Procedure Summary       Date: 01/30/25 Room / Location: [facility] - Echocardiology [facility]    Anesthesia Start: 1358 Anesthesia Stop: 1428    Procedure: EC-RAIFQ W/O CONT Diagnosis:       Radiculopathy affecting upper extremity      Intractable back pain      Radiculopathy affecting upper extremity      Intractable back pain      (Bacteremia)    Scheduled Providers: Effie Vanegas M.D.; Minesh Smart M.D. Responsible Provider: Minesh Smart M.D.    Anesthesia Type: general, MAC ASA Status: 3            Final Anesthesia Type: general, MAC  Last vitals  BP   Blood Pressure : 116/66    Temp   36.4 °C (97.5 °F)    Pulse   76   Resp   18    SpO2   95 %      Anesthesia Post Evaluation    Patient location during evaluation: PACU  Patient participation: complete - patient participated  Level of consciousness: awake and alert    Airway patency: patent  Anesthetic complications: no  Cardiovascular status: hemodynamically stable  Respiratory status: acceptable  Hydration status: euvolemic    PONV: none          No notable events documented.     Nurse Pain Score: 7 (NPRS)

## 2025-01-31 NOTE — CARE PLAN
The patient is Stable - Low risk of patient condition declining or worsening    Shift Goals  Clinical Goals: pt will report tolerable pain level, pt will remain NPO for RAFIQ  Patient Goals: pain manegement  Family Goals: RILEY    Progress made toward(s) clinical / shift goals:  pt remained Npo for RAFIQ. advanced to diabetic and renal diet. Pain remains controled with medication therapy. Pt refused shower today. Pt still experiencing lightheadedness and dizziness with ambulation. Encouraged pt to sit up in bed as much as possible to prevent orthostatic hypotension.   Problem: Pain - Standard  Goal: Alleviation of pain or a reduction in pain to the patient’s comfort goal  Outcome: Progressing     Problem: Knowledge Deficit - Standard  Goal: Patient and family/care givers will demonstrate understanding of plan of care, disease process/condition, diagnostic tests and medications  Outcome: Progressing     Problem: Fall Risk  Goal: Patient will remain free from falls  Outcome: Progressing     Problem: Acute Care of the Diabetic Patient  Goal: Optimal Outcome for the Diabetic Patient  Outcome: Progressing     Problem: Bowel Elimination  Goal: Establish and maintain regular bowel function  Outcome: Progressing     Problem: Vancomycin  Goal: Labs Reviewed  Outcome: Progressing       Patient is not progressing towards the following goals:

## 2025-01-31 NOTE — PROGRESS NOTES
Hospital Medicine Daily Progress Note    Date of Service  1/31/2025    Chief Complaint  Anu Manuel is a 69 y.o. female admitted 1/22/2025 with neck pain     Hospital Course  Anu Manuel is a 69 y.o. female with past medical history of insulin-dependent diabetes mellitus, ESRD on dialysis, CAD, recent admission for acute on chronic back pain who presented 1/22/2025 with bilateral arm pain.  MRI of the cervical spine from 1/8/2025 revealed abnormal bone marrow signal with raising the possibility of infection and short-term follow-up imaging was recommended. Nephrology consulted for scheduled dialysis.  MRI cervical spine showed C7-T1 findings concerning for discitis/osteomyelitis with moderate spinal canal stenosis at C6-7.  Neurosurgery was consulted recommended IR bone biopsy and conservative management.  Did not recommend surgery at this time.  Discussed with IR however they do not perform cervical bone biopsies.  Patient found to be bacteremic and infectious disease was consulted started on IV antibiotics.    Interval Problem Update  1/28 vital stable on room air, afebrile  Creatinine 3.80, GFR 12  Blood cultures 1/2 Gram stain with gram-positive cocci in pairs  Notified by nursing patient with severe hypoglycemia this morning blood glucose 37, given IV dextrose, repeat 112  Discussed with interventional radiology, they do not perform cervical bone biopsies  Discussed with neurosurgery recommending conservative nonoperative treatment for now recommended IV antibiotics  Discussed with infectious disease started on vancomycin, recommended an echocardiogram  Echo pending  Patient very tearful and anxious at time of my evaluation.  She reports the  oral oxycodone takes her pain from 10 to a 6 but she has been having burning pains in her left arm.  After discussion with pharmacy will increase gabapentin to 300 mg which is max for her ESRD.  Start scheduled Tylenol.  Continues to require IV  Dilaudid for breakthrough pain, continue to monitor respiratory status closely    1/29 vital stable on room air  1/2 blood culture with Enterococcus faecium   -Repeat blood cultures ordered for tomorrow  Echocardiogram showed EF 70%, normal diastolic function echodensity on the mitral valve and tricuspid valve suggested of valvular vegetation.  Discussed with infectious disease he requested a RAFIQ, changed antibiotic to daptomycin  Discussed with cardiology, plan for RAFIQ tomorrow n.p.o. at midnight  Glucose 300 this morning, it appears per MAR glargine was held yesterday evening  Patient continues to complain of severe pain in her left arm mostly that she reports is still the burning pain but also feels deep in her left upper bicep.  Venous ultrasound was negative for DVT.  Increase oral oxycodone dosing and gave a one-time dose extra Dilaudid.  Thus far respiratory status and blood pressure stable continue to monitor.    1/30 no acute events overnight  Creatinine 3.39, GFR 14  Patient was hypoglycemic this morning at 40, given D50 with improvement.  Decrease glargine 8 units nightly  Changed gabapentin to Lyrica to help with pain control  Patient went for RAFIQ this afternoon, discussed with cardiology she did not have any valvular vegetations however she had an irregular echogenic density in the right atrium by the IVC with a mobile density possibly infection or thrombus.  Recommended discussing with radiology to see if CT could better assess this  Discussed with radiology, CT would be unable to distinguish mass versus thrombus versus infection  Discussed with infectious disease, she will need 6 weeks of antibiotics from date of negative cultures  Repeat blood cultures pending  Discussed with nephrology plan for HD tomorrow  Today patient reports that her left arm plain has completely resolved she is now having pain in her right arm instead.  Still very tearful and anxious.  I am concerned that her anxiety is  contributing to worsening of her symptoms.  She is agreeable to talking with psychiatry.  Psychiatry consulted    1/31 vital stable  WBC 7.2, hemoglobin 9.7  Repeat blood cultures 1/29 negative to date  Patient went for dialysis this morning  Discussed with infectious disease since patient has ongoing back and arm pain recommended repeat MRI C-spine which has been ordered  Discussed with psychiatry recommended to discontinue Cymbalta and start BuSpar 5 mg twice daily with the Atarax as needed.  They have also ordered psychotherapy for her  Patient again hysterical at time of my evaluation.  Now reports that pain in both her arms is back in addition to between her shoulder blades. She is frequently using oxy 15 and dilaudid. I ordered lidocaine patches for her back and shoulders.  Start oxycodone CR twice daily to help with pain control    I have discussed this patient's plan of care and discharge plan at IDT rounds today with Case Management, Nursing, Nursing leadership, and other members of the IDT team.    Consultants/Specialty  nephrology  Infectious disease  Cardiology  Psychiatry    Code Status  DNAR/DNI    Disposition  The patient is not medically cleared for discharge to home or a post-acute facility.      I have placed the appropriate orders for post-discharge needs.    Review of Systems  Review of Systems   Constitutional:  Positive for malaise/fatigue. Negative for chills and fever.   Respiratory:  Negative for shortness of breath.    Cardiovascular:  Negative for chest pain.   Gastrointestinal:  Negative for abdominal pain and nausea.   Musculoskeletal:  Positive for back pain, joint pain, myalgias and neck pain.   Neurological:  Positive for weakness.   Psychiatric/Behavioral:  The patient is nervous/anxious.         Physical Exam  Temp:  [36.1 °C (96.9 °F)-36.7 °C (98 °F)] 36.2 °C (97.1 °F)  Pulse:  [70-87] 87  Resp:  [16-18] 16  BP: (102-135)/(49-80) 124/77  SpO2:  [93 %-98 %] 95 %    Physical  Exam  Constitutional:       General: She is in acute distress.      Appearance: She is ill-appearing.   HENT:      Mouth/Throat:      Pharynx: Oropharynx is clear.   Eyes:      Conjunctiva/sclera: Conjunctivae normal.   Cardiovascular:      Rate and Rhythm: Normal rate and regular rhythm.      Heart sounds: Normal heart sounds.   Pulmonary:      Effort: Pulmonary effort is normal. No respiratory distress.      Breath sounds: No wheezing.   Abdominal:      General: There is distension.      Palpations: Abdomen is soft.      Tenderness: There is no abdominal tenderness.   Musculoskeletal:         General: Tenderness (Left arm) present.      Right lower leg: No edema.      Left lower leg: No edema.   Skin:     General: Skin is warm.   Neurological:      General: No focal deficit present.      Mental Status: She is alert and oriented to person, place, and time.      Comments: Overall motor strength 5 out of 5 all extremity, does have weak left hand , sensory intact   Psychiatric:         Mood and Affect: Mood is anxious. Affect is tearful.         Behavior: Behavior is agitated.         Fluids    Intake/Output Summary (Last 24 hours) at 1/31/2025 1635  Last data filed at 1/31/2025 1031  Gross per 24 hour   Intake 740 ml   Output 1500 ml   Net -760 ml            Laboratory  Recent Labs     01/31/25  0206   WBC 7.2   RBC 3.09*   HEMOGLOBIN 9.7*   HEMATOCRIT 31.4*   .6*   MCH 31.4   MCHC 30.9*   RDW 63.0*   PLATELETCT 151*   MPV 10.6       Recent Labs     01/30/25  1237 01/31/25  0206   SODIUM 138 136   POTASSIUM 4.6 4.2   CHLORIDE 102 100   CO2 24 19*   GLUCOSE 128* 121*   BUN 55* 55*   CREATININE 3.39* 3.68*   CALCIUM 8.1* 7.4*                   Imaging  EC-RAFIQ W/O CONT   Final Result      US-EXTREMITY VENOUS UPPER UNILAT LEFT   Final Result      EC-ECHOCARDIOGRAM COMPLETE W/O CONT   Final Result      MR-CERVICAL SPINE-WITH & W/O   Final Result         1.  Postoperative changes in the cervical spine as  described above.      2.  Abnormal signal is noted in the prevertebral soft tissues extending from the mid C2 to the C7-T1 level. Abnormal marrow signal is noted at the C7-T1 level with mild enhancement of the intervertebral disc space at this level. These findings are    concerning for discitis-osteomyelitis.      3.  There is moderate spinal canal stenosis at C6-7.         US-RUQ   Final Result      1.  Prior cholecystectomy.      2.  Common bile duct diameter measured at 12 mm with some intrahepatic biliary ductal dilatation. This may be related to presence of prior cholecystectomy.      3.  The liver is heterogeneous consistent with fatty change versus hepatocellular dysfunction.      DX-CHEST-PORTABLE (1 VIEW)   Final Result         1.  No acute cardiopulmonary disease.   2.  Atherosclerosis      MR-CERVICAL SPINE-WITH & W/O    (Results Pending)        Assessment/Plan  * Radiculopathy affecting upper extremity- (present on admission)  Assessment & Plan  Her pain has been debilitating despite Neurontin and Cymbalta.  She had the abnormal MRI noted below.  Because of this the MRI will be repeated as she may benefit from spine surgery consultation for either inpatient or outpatient management based on the results.  .  She will be given oral narcotics and no further IV narcotics at this time in order to start the transition of discharge home eventually on oral   Continue on Cymbalta, gabapentin  MRI C-spine showed osteomyelitis/discitis  Requiring frequent IV narcotics, close monitoring for toxicity while on IV controlled substance.   Neurosurgery recommending conservative management, no surgical intervention at this time  Infectious disease consulted    Bacteremia- (present on admission)  Assessment & Plan  1/2 blood cultures from admission Enterococcus  Infectious disease consulted  -Changed to vancomycin  -Echocardiogram showed endocarditis  -Repeat blood cultures per ID    Right atrial mass- (present on  admission)  Assessment & Plan  TTE showed possible tricuspid and mitral valve endocarditis  Infectious disease following  Cardiology consulted, RAFIQ 1/30- showed right atrial mass with mobile density   Unclear if this is mass versus clot versus infection  Discussed with IR, CT would not delineate this    Acute osteomyelitis of cervical spine ([facility])- (present on admission)  Assessment & Plan  MRI showed C7-T1 findings concerning for discitis osteomyelitis  Infectious disease consulted  -Vancomycin  -repeat C spine mri pending  Follow-up blood culture result  Neurosurgery recommended conservative management with IV antibiotics, no surgical intervention  Discussed with IR, unable to perform cervical spine bone biopsy    CKD (chronic kidney disease) stage 4, GFR 15-29 ml/min ([facility])- (present on admission)  Assessment & Plan  Continue on torsemide 100 mg daily  Repeat BMP in a.m. to monitor renal function  Nephrology following for dialysis needs      Intractable back pain- (present on admission)  Assessment & Plan  Also complaining of severe burning neuropathic pain in her arm  Increase gabapentin 300 mg daily, continue duloxetine  Start scheduled Tylenol 500 mg every 6 hours  Continue oral and IV opiates as needed    Type 2 diabetes mellitus, with long-term current use of insulin ([facility])- (present on admission)  Assessment & Plan  On sliding scale and glargine  Monitor of for hypoglycemic episode    DNR (do not resuscitate)- (present on admission)  Assessment & Plan  Per her wishes  She has been followed by palliative care in the past        ESRD needing dialysis ([facility])- (present on admission)  Assessment & Plan  She has a left arm fistula  Nephrology will be consulted for dialysis    CAD S/P percutaneous coronary angioplasty- (present on admission)  Assessment & Plan  History of    Primary hypertension- (present on admission)  Assessment & Plan  Continue Norvasc and Toprol with holding parameters         VTE prophylaxis:  heparin SC    I have performed a physical exam and reviewed and updated ROS and Plan today (1/31/2025). In review of yesterday's note (1/30/2025), there are no changes except as documented above.

## 2025-01-31 NOTE — PROGRESS NOTES
Infectious Disease Progress Note    Author: Payton Han M.D. Date & Time of service: 2025  1:39 PM    Chief Complaint:  vertebral osteo  Efaecium bacteremia    Interval History:  69 y.o. diabetic female originally  admitted 2025 worsening back pain and arm pain   AF GPC E faecium Plan of care reviewed with patient   AF crying pain-had dextrose infusion that arm   AF WBC 7.2 anxious and teary c/o severe, excruciating pain in left worse than right arm today  Labs Reviewed and Medications Reviewed.    Review of Systems:  Review of Systems   Constitutional:  Negative for fever.   Musculoskeletal:  Positive for joint pain and neck pain.       Hemodynamics:  Temp (24hrs), Av.3 °C (97.4 °F), Min:35.8 °C (96.4 °F), Max:36.7 °C (98 °F)  Temperature: 36.2 °C (97.1 °F)  Pulse  Av.4  Min: 54  Max: 90   Blood Pressure : 124/77       Physical Exam:  Physical Exam  Vitals and nursing note reviewed.   Constitutional:       General: She is not in acute distress.     Appearance: She is not ill-appearing, toxic-appearing or diaphoretic.   Eyes:      General: No scleral icterus.     Extraocular Movements: Extraocular movements intact.      Pupils: Pupils are equal, round, and reactive to light.   Cardiovascular:      Rate and Rhythm: Normal rate.   Pulmonary:      Effort: Pulmonary effort is normal. No respiratory distress.   Abdominal:      General: There is no distension.      Tenderness: There is no abdominal tenderness.   Musculoskeletal:      Cervical back: Neck supple.      Comments: Cord LUE   Skin:     Coloration: Skin is not jaundiced.      Findings: Bruising present.   Neurological:      General: No focal deficit present.      Mental Status: She is alert.   Psychiatric:      Comments: Teary and anxious         Meds:    Current Facility-Administered Medications:     MD Alert...Vancomycin per Pharmacy    atorvastatin    busPIRone    pregabalin    insulin GLARGINE    oxyCODONE  immediate-release **OR** oxyCODONE immediate-release **OR** HYDROmorphone    hydrOXYzine HCl    heparin    heparin    acetaminophen    acetaminophen    torsemide    NS    methocarbamol    petrolatum    metoprolol SR    amLODIPine    levothyroxine    traZODone    venlafaxine XR    heparin    senna-docusate **AND** polyethylene glycol/lytes    insulin lispro **AND** POC blood glucose manual result **AND** NOTIFY MD and PharmD **AND** Administer 20 grams of glucose (approximately 8 ounces of fruit juice) every 15 minutes PRN FSBG less than 70 mg/dL **AND** dextrose bolus    Pharmacy Consult Request    Labs:  Recent Labs     01/31/25  0206   WBC 7.2   RBC 3.09*   HEMOGLOBIN 9.7*   HEMATOCRIT 31.4*   .6*   MCH 31.4   RDW 63.0*   PLATELETCT 151*   MPV 10.6     Recent Labs     01/30/25  1237 01/31/25  0206   SODIUM 138 136   POTASSIUM 4.6 4.2   CHLORIDE 102 100   CO2 24 19*   GLUCOSE 128* 121*   BUN 55* 55*     Recent Labs     01/30/25  1237 01/31/25  0206   ALBUMIN  --  2.8*   CREATININE 3.39* 3.68*       Imaging:  MR-CERVICAL SPINE-WITH & W/O    Result Date: 1/27/2025 1/27/2025 4:19 PM HISTORY/REASON FOR EXAM:  ARM PAIN; she is a dialysis patient and will get dialysis after the scan. TECHNIQUE/EXAM DESCRIPTION: MRI of the cervical spine without and with contrast. The study was performed on a [employer] ASA Signa 1.5 Twyla MRI scanner. T1 sagittal, T2 fast spin-echo sagittal, T1 postcontrast fat-suppressed sagittal, and gradient-echo axial images were obtained of the cervical spine. 12 mL ProHance contrast were administered  intravenously. COMPARISON:  CT dated 1/10/2025 FINDINGS:  Postoperative changes are noted with cervical fusion across C4-C7. Minimal prevertebral soft tissue edema is noted. Increased signal is noted on STIR imaging within the adjacent endplates at the C6-C7 level. Motion artifact limits evaluation. Spinal cord signal is grossly within normal limits though subtle abnormality cannot be identified due to  motion artifact. Included portion of the posterior fossa is normal. Findings specific to each level are described below: C2-3: Endplate disc osteophyte complex is present causing mild canal narrowing. There is probably mild left foraminal narrowing. C3-4:  Postoperative changes noted with a well decompressed spinal canal. Neuroforamina are normal. C4-5:  Spinal canal is well decompressed. There is no significant canal narrowing. C5-6: Spinal canal is well decompressed. C6-7: Endplate discussed by complex at C6-C7 results in moderate canal narrowing with slight cord deformity. There is also moderate bilateral foraminal narrowing at this level. C7-T1: No significant abnormality. Mild enhancement of the intervertebral disc space at C6-C7 and minimal enhancement anteriorly within the prevertebral soft tissues. Linear enhancement also noted extending inferiorly along the right paracentral aspect in the prevertebral region at the C7-T1 level. No definite abnormal epidural enhancement is seen.     1.  Postoperative changes in the cervical spine as described above. 2.  Abnormal signal is noted in the prevertebral soft tissues extending from the mid C2 to the C7-T1 level. Abnormal marrow signal is noted at the C7-T1 level with mild enhancement of the intervertebral disc space at this level. These findings are concerning for discitis-osteomyelitis. 3.  There is moderate spinal canal stenosis at C6-7.     US-RUQ    Result Date: 1/22/2025 1/22/2025 11:02 AM HISTORY/REASON FOR EXAM: Right upper quadrant abdominal pain. TECHNIQUE/EXAM DESCRIPTION: Ultrasound of the gallbladder, liver and biliary tree. COMPARISON: None FINDINGS: The liver echogenicity is heterogeneous. There is no evidence of hepatic mass. The gallbladder is surgically absent. The common bile duct is measured at 12 mm. There is some intrahepatic biliary ductal dilatation. The visualized portions of the portal vein and inferior vena cava are normal. The pancreas  is normal. The right kidney is normal.     1.  Prior cholecystectomy. 2.  Common bile duct diameter measured at 12 mm with some intrahepatic biliary ductal dilatation. This may be related to presence of prior cholecystectomy. 3.  The liver is heterogeneous consistent with fatty change versus hepatocellular dysfunction.    DX-CHEST-PORTABLE (1 VIEW)    Result Date: 1/22/2025 1/22/2025 5:43 AM HISTORY/REASON FOR EXAM: Chest Pain TECHNIQUE/EXAM DESCRIPTION:  Single AP view of the chest. COMPARISON: January 6, 2025 FINDINGS: Position of medical devices appears stable. The cardiac silhouette appears within normal limits. Atherosclerotic calcification of the aorta is noted.  The central pulmonary vasculature appears normal. Asymmetric elevation of the right hemidiaphragm is noted.  Bilateral lungs are clear. No significant pleural effusions are identified. The bony structures appear age-appropriate.     1.  No acute cardiopulmonary disease. 2.  Atherosclerosis    CT-TSPINE W/O PLUS RECONS    Result Date: 1/10/2025  1/10/2025 9:54 AM HISTORY/REASON FOR EXAM:  SYNCOPE; BACK PAIN; ARM PAIN. TECHNIQUE/EXAM DESCRIPTION AND NUMBER OF VIEWS: CT thoracic spine without contrast, with reconstructions. The study was performed on a [employer] CT scanner. Thin-section helical scanning was performed from C7-T1 through T12-L1. Sagittal and coronal multiplanar reconstructions were generated from the axial images. Low dose optimization technique was utilized for this CT exam including automated exposure control and adjustment of the mA and/or kV according to patient size. COMPARISON: None. FINDINGS: Nomenclature: C7-T1 is axial image 26 of 254. Alignment: Moderate leftward curvature centered at T9-10. Minimal anterolisthesis of C7 on T1. Vertebrae: No acute fracture. No aggressive osseous lesions. Degenerative loss of intervertebral disc space at several levels throughout the mid thoracic spine. Endplate sclerosis at T9-10 and is unchanged.  Spondylosis: No high-grade canal or foraminal stenosis. Soft tissues: Trace bilateral pleural effusions.     No acute fracture or traumatic malalignment.    CT-CSPINE WITHOUT PLUS RECONS    Result Date: 1/10/2025  1/10/2025 9:54 AM HISTORY/REASON FOR EXAM: SYNCOPE; BACK PAIN; ARM PAIN TECHNIQUE/EXAM DESCRIPTION: CT cervical spine without contrast, with reconstructions. The study was performed on a helical multidetector CT scanner. Thin-section helical scanning was performed from the skull base through T1. Sagittal and coronal multiplanar reconstructions were generated from the axial images. Low dose optimization technique was utilized for this CT exam including automated exposure control and adjustment of the mA and/or kV according to patient size. COMPARISON:  CT 3/10/2019, MRI 1/8/2025. FINDINGS: Alignment: Grade 1 anterolisthesis of C2 on C3 and C7 on T1. Minimal retrolisthesis of C3 on C4. C1 and C2 lateral masses are congruent. Vertebrae: Prior interbody and posterior fusion of C4-C7. Hardware creates streak artifact limiting evaluation of vertebral bodies and posterior elements at the surgical levels. No evidence of lucency surrounding the hardware. There is sclerosis of the C7-T1 endplates as before, with increased destruction of the intervening disc space. No acute fracture. No aggressive osseous lesion. Spondylosis: No high-grade osseous canal or foraminal stenosis. Soft tissues: No prevertebral soft tissue swelling. Visualized lung apices are clear. Other: Visualized intracranial contents are unremarkable.     Prior interbody and posterior fusion of C4-C7. Sclerosis of the C7-T1 endplates as before, with increased destruction of the intervening disc space since 2019. Please correlate clinically for discitis-osteomyelitis.    CT-HEAD W/O    Result Date: 1/10/2025  1/10/2025 9:54 AM HISTORY/REASON FOR EXAM: [id number]  Dizziness TECHNIQUE/EXAM DESCRIPTION AND NUMBER OF VIEWS: CT of the head without contrast. The  study was performed on a helical multidetector CT scanner. Contiguous 2.5 mm axial sections were obtained from the skull base through the vertex. Up to date radiation dose reduction adjustments have been utilized to meet ALARA standards for radiation dose reduction. COMPARISON:  4/1/2024 FINDINGS: There is no evidence of acute intracranial hemorrhage, mass, mass-effect or shift of midline structures. Gray-white matter differentiation is grossly preserved. Ventricle size and brain parenchymal volume are appropriate for this patient's stated age. The basal cisterns are patent. There are no abnormal extra-axial fluid collections. No depressed or widely  calvarial fracture is seen. The visualized paranasal sinuses and temporal bone structures are aerated. ___________________________________     1. No acute intracranial abnormality. No evidence of acute intracranial hemorrhage or mass lesion.     MR-THORACIC SPINE-WITH & W/O    Result Date: 1/8/2025 1/8/2025 8:02 AM HISTORY/REASON FOR EXAM:  BACK PAIN BACK PAIN - Pt states for a long time she has had pain between her scapula, has been seen for it. States pain is back and worse tonight. TECHNIQUE/EXAM DESCRIPTION: MRI of the thoracic spine without and with contrast. The study was performed on a [employer] Signa 1.5 Twyla MRI scanner. T1 sagittal, T2 fast spin-echo sagittal, and T2 axial images were obtained of the thoracic spine. T1 post-contrast fat suppressed sagittal images were obtained. Optional T1 post-contrast axial images may be obtained. 10 mL ProHance contrast was administered intravenously. COMPARISON:  CT thoracic spine 12/22/2024 FINDINGS: Compensatory levocurvature of the thoracic spine noted with a rightward curvature of the lumbar spine. Spinal cord signal intensity is within normal limits. The conus is identified at the T12-L1 level. Type II marrow changes are noted in the adjacent T9 and T10 endplates. At T9-10, endplate degenerative changes noted  with a posterior disc osteophyte complex mildly encroaching upon the spinal canal. There is also bilateral advanced facet degeneration at this level. These degenerative changes result in mild canal narrowing at this level. The remaining thoracic levels do not demonstrate any evidence of significant canal or foraminal stenosis. Postcontrast images through the thoracic spine do not demonstrate any abnormal enhancement.     Mild degenerative changes at T9-10. Otherwise, no significant abnormality is identified in the thoracic spine.    MR-CERVICAL SPINE-WITH & W/O    Result Date: 1/8/2025 1/8/2025 8:02 AM HISTORY/REASON FOR EXAM:  BACK PAIN TECHNIQUE/EXAM DESCRIPTION: MRI of the cervical spine without and with contrast. The study was performed on a [employer] Signa 1.5 Twyla MRI scanner. T1 sagittal, T2 fast spin-echo sagittal, T1 postcontrast fat-suppressed sagittal, and gradient-echo axial images were obtained of the cervical spine. 10 mL ProHance contrast were administered  intravenously. COMPARISON:  3/10/2019, MRI dated 9/1/2016. FINDINGS:  C4-C7 posterior lateral mass fusion noted. Abnormal marrow signal is noted involving the adjacent endplates at C6-C7 with increased signal on STIR imaging and low signal on T1 imaging, more marked in C7. Motion artifact significantly limits evaluation. Included portion of the posterior fossa is normal. Spinal cord signal is grossly normal. Findings specific to each level are described below: . C2-3: Normal C3-4:  Endplate disc osteophyte complex with bilateral uncovertebral degeneration. There is asymmetric mild left-sided canal narrowing due to left predominant uncovertebral degeneration. There is moderate left foraminal narrowing. C4-5: Endplate disc osteophyte complex with bilateral uncovertebral degeneration but no significant canal or foraminal stenosis. C5-6: Postoperative changes noted at this level. There is no significant canal stenosis. Neural foramina are difficult to  assess due to artifact from the lateral mass screws. C6-7: Endplate disc osteophyte complex moderately encroaches upon the spinal canal causing moderate canal narrowing. There is bilateral uncovertebral degeneration with moderate right foraminal narrowing. C7-T1: Minimal endplate disc osteophyte complex without significant encroachment upon the spinal canal or neural foramina. T1-2: Normal. No definite abnormal epidural enhancement is identified in cervical spine. There is mild enhancement of the bone marrow of the adjacent endplates at C6-C7 T2-weighted images demonstrate minimal high signal in the prevertebral space at the C6-C7 level. However, there is no definite associated abnormal enhancement in this location. Prevertebral soft tissues are within normal limits.     1.  Postoperative changes noted in the cervical spine with posterior lateral mass fusion between C4 and C7. 2.  Abnormal bone marrow signal abnormal enhancement identified at the C6-C7 level involving the adjacent endplates and the entirety of the C7 vertebral body. Slightly increased T2 signal is also noted within the disc space at this level with minimal enhancement. However, this area did demonstrate a sclerotic appearance on previous CT. Minimal abnormal signal is present also within the prevertebral soft tissues at this level. A new infectious process cannot be completely excluded. Recommend correlation with history, physical exam and consider short-term follow-up imaging. 3.  Moderate canal stenosis is noted at C6-C7.    DX-CHEST-PORTABLE (1 VIEW)    Result Date: 1/6/2025 1/6/2025 12:55 PM HISTORY/REASON FOR EXAM:  Chest Pain. TECHNIQUE/EXAM DESCRIPTION AND NUMBER OF VIEWS: Single portable view of the chest. COMPARISON: 12/31/2024 FINDINGS: Stable right IJ central venous catheter. Cardiomediastinal silhouette is stable. Aortic calcified atherosclerotic plaque. Coronary artery stent. No focal consolidation, pleural effusion, pulmonary edema or  pneumothorax. No acute osseous abnormality.     No acute cardiopulmonary abnormality.    DX-CHEST-PORTABLE (1 VIEW)    Result Date: 12/31/2024 12/31/2024 3:32 AM HISTORY/REASON FOR EXAM:  Chest Pain. TECHNIQUE/EXAM DESCRIPTION AND NUMBER OF VIEWS: Single portable view of the chest. COMPARISON: 12/21/2024 FINDINGS: Cardiac silhouette is normal in size. No airspace infiltrate, pleural effusion, or pneumothorax. Dialysis catheter tip overlies the right ventricle. Scoliosis.     No acute process.      Micro:  Results       Procedure Component Value Units Date/Time    BLOOD CULTURE [[id number]]  (Abnormal)  (Susceptibility) Collected: 01/27/25 1859    Order Status: Completed Specimen: Blood from Peripheral Updated: 01/31/25 0730     Significant Indicator POS     Source BLD     Site PERIPHERAL     Culture Result Growth detected by automated blood culture system.  01/28/2025  12:13        Enterococcus faecium  If daptomycin is used for E. faecium treatment, high-dose  regimen is recommended.  The susceptibility profile for this organism indicates that  Streptomycin would not be an effective component of  combination therapy.        Staphylococcus epidermidis  POSSIBLE CONTAMINANT: Isolated from one set only, please  correlate with clinical condition. Contact the Microbiology  department within 48 hr if susceptibility testing is needed.      Susceptibility       Enterococcus faecium (2)       Antibiotic Interpretation Microscan   Method Status    Ampicillin Resistant >8 mcg/mL PAULINE Final    Gent Synergy Sensitive <=500 mcg/mL PAULINE Final    Vancomycin Sensitive 0.5 mcg/mL PAULINE Final                       BLOOD CULTURE [[id number]] Collected: 01/29/25 1828    Order Status: Completed Specimen: Blood from Peripheral Updated: 01/29/25 2008     Significant Indicator NEG     Source BLD     Site PERIPHERAL     Culture Result No Growth  Note: Blood cultures are incubated for 5 days and  are monitored continuously.Positive blood  cultures  are called to the RN and reported as soon as  they are identified.      BLOOD CULTURE [[id number]] Collected: 01/29/25 1828    Order Status: Completed Specimen: Blood from Peripheral Updated: 01/29/25 2008     Significant Indicator NEG     Source BLD     Site PERIPHERAL     Culture Result No Growth  Note: Blood cultures are incubated for 5 days and  are monitored continuously.Positive blood cultures  are called to the RN and reported as soon as  they are identified.      BLOOD CULTURE [[id number]] Collected: 01/28/25 0241    Order Status: Completed Specimen: Blood from Peripheral Updated: 01/28/25 0608     Significant Indicator NEG     Source BLD     Site PERIPHERAL     Culture Result No Growth  Note: Blood cultures are incubated for 5 days and  are monitored continuously.Positive blood cultures  are called to the RN and reported as soon as  they are identified.              Assessment:  Active Hospital Problems    Diagnosis     *Radiculopathy affecting upper extremity [M54.10]     Bacteremia [R78.81]     Acute osteomyelitis of cervical spine (HCC) [M46.22]     CKD (chronic kidney disease) stage 4, GFR 15-29 ml/min ([facility]) [N18.4]     DNR (do not resuscitate) [Z66]     Type 2 diabetes mellitus, with long-term current use of insulin ([facility]) [E11.9, Z79.4]     Intractable back pain [M54.9]     ESRD needing dialysis ([facility]) [N18.6, Z99.2]     CAD S/P percutaneous coronary angioplasty [I25.10, Z98.61]     Primary hypertension [I10]      Abnormal MRI C spine  Suspected vertebral osteomyelitis-IR and Neurosurgery declined biopsy  Blood cult Efaecium  Endocarditis  -TTE with vegetations TV and MV  -RAFIQ equivocal   Sclerotic valves with mitral annular calcifications, but no obvious valvular vegetation.   There is a irregular echogenic density adherent to the right atrial posterior wall by IVC entry with mobile linear density off the density that can be either infections or thrombus.  Does not appear attached to the  catheter tip, but cannot completely rule out.           PLAN:   Repeat Bcxs every 48 hours until neg  RAFIQ  due to abnormal TTE  Transition back to vancomycin-resistant to ampicillin  Recommend repeat MRI C spine-given worsening pain  Anticipate 6+ weeks IV abx from date of negative blood cxs     PICC -TBD    Plan of care discussed with Dr Esposito-states pain not much changed Will continue to follow     Payton Han M.D.

## 2025-01-31 NOTE — CARE PLAN
The patient is Stable - Low risk of patient condition declining or worsening    Shift Goals  Clinical Goals: manage pain to comfort level, remain free of falls  Patient Goals: pain management  Family Goals: elissa    Progress made toward(s) clinical / shift goals:    Problem: Pain - Standard  Goal: Alleviation of pain or a reduction in pain to the patient’s comfort goal  Outcome: Progressing  Note: 1. Document pain using the appropriate pain scale per order or unit policy 2. Educate and implement non-pharmacologic comfort measures (i.e. relaxation, distraction, massage, cold/heat therapy, etc.) 3. Pain management medications as ordered 4. Reassess pain after pain med administration per policy      Problem: Fall Risk  Goal: Patient will remain free from falls  Outcome: Progressing  Note: 1. Assess for fall risk factors 2. Implement fall precautions        Patient is not progressing towards the following goals:

## 2025-01-31 NOTE — CONSULTS
"PSYCHIATRIC CONSULTATION:  Reason for admission:     Reason for consult: Anxious and overwhelmed with current medical status   Requesting Physician: Annie Esposito DO  Supervising Physician:Bri Velazquez    Legal status: Not applicable     Chief Complaint: Chronic back pain and bilateral upper extremity pain    HPI:Ms. Anu Manuel is a 69 year old female with history insulin dependent diabetes mellitus, hypertension, coronary artery disease, and end stage renal disease who is dialyzed presented to ED on 1/22/25 for bilateral upper extremity pain for four weeks. Patient has had chronic back pain and upper extremity pain and has been in and out of the hospital multiple times in the past few months for pain management. Patient was seen at bedside, she was alert and oriented to person, time, place, and situation. She was very tearful and anxious sitting in bed wearing hospital gown. Patient had just returned from dialysis. Patient states she feels \"depressed and really scared,\" she is really scared about her health. She states she used to have panic attacks but has not had any in years. She describes her panic attacks as not being able to breathe, she is unable to take a deep breath and feels a tightness in her chest. I spoke with patient about possible mindfulness exercises that could help with this and patient is agreeable as taking her through taking some deep breathes and laying back in bed helped with the anxiety. Patient states her appetite has been good, her sleep is okay. She states the trazodone helps her sleep however she wakes up multiple times a night and sometimes is unable to go back to sleep. She states her energy \"sucks\" with her pain and infection she has currently. It is harder for her to do things around the house. She normally cleans her home, showers, bathes and cooks okay. Since her pain has worsened she has had her friend Hirsch help with keeping cleaning and she usually showers while " "her brother Morgan or Joycelyn are at her house just in case she falls. She denies any thoughts of suicide or homicide. She states she has never attempted suicide. She has felt depressed especially during the winter months as her mother,  and brother  all within 3 weeks of each other in 2020. She states she sees a psychiatrist whom has prescribed her antidepressants that help her with her depression. She states she tried seeing a therapist in the past but she did not like her so she never saw a therapist again. She is however open to seeing a therapist outpatient and psychotherapy while in the hospital.         Psychiatric Review of Systems:  Mood: Depressed and scared  Sleep: Okay, her trazadone helps her fall asleep, she does state sometimes its hard for her to stay asleep  Appetite: Good, she has not noticed any significant weight loss or gain  Energy: \"Sucks because of this infection and pain\"   SI/HI: Denies SI or HI, has thought about not going to dialysis but she goes because her children would get mad at her if she did not go  Hallucinations: Denies auditory of visual hallucinations. Denies delusions.   Bipolar: Denies having any bipolar, darya symptoms in the past.   Trauma: Denies having any recurrent nightmares or flashbacks.     Medical Review of Systems: as reported by pt. All systems reviewed. Only those found to be + are noted below. All others are negative.   Cardiovascular: Endorses some chest tightness  Respiratory: Endorses shortness of breath when she has her panic attacks  Gastrointestinal: Denies any abdominal pain, nausea, vomiting, diarrhea  Neurology: alert and oriented x 4, no tremors or headaches    Past Medical Hx:   Patient Active Problem List   Diagnosis    Vitamin D deficiency    Vertigo    Type 1 diabetes mellitus with hyperglycemia ([facility])    Diabetic polyneuropathy (CMS-HCC)    Lumbar spinal stenosis    Cellulitis    Left hip pain    Post-operative wound abscess    " Tobacco abuse    Hypoglycemia    Primary hypertension    Anxiety    Nausea & vomiting    Dyslipidemia    GERD (gastroesophageal reflux disease)    Lung nodule    Hemoptysis    CAD S/P percutaneous coronary angioplasty    Pain in the chest    Elevated liver enzymes    Anemia    Hyponatremia    Elevated troponin    Epigastric pain    Generalized abdominal pain    Jaundice    Bilious vomiting with nausea    Cholecystitis    Anasarca    Fluid collection at surgical site    Elevated LFTs    Constipation    Liver failure without hepatic coma (HCC)    Long-term insulin use (HCC)    Senile purpura (HCC)    BMI 23.0-23.9, adult    Moderate episode of recurrent major depressive disorder (HCC)    Aortic atherosclerosis (HCC)    Cholestatic liver disease    Uncomplicated opioid dependence (HCC)    Chronic low back pain    Proteinuria    High anion gap metabolic acidosis    Chronic obstructive pulmonary disease    ESRD needing dialysis (HCC)    MDD (major depressive disorder)    Right inferior pubic rami fracture    Syncope    Age-related osteoporosis with current pathological fracture with routine healing    History of medication noncompliance    Hypothyroidism, postsurgical    Acute right-sided thoracic back pain    Steatohepatitis    Anemia due to chronic kidney disease, on chronic dialysis (HCC)    Radiculopathy affecting upper extremity    DNR (do not resuscitate)    Type 2 diabetes mellitus, with long-term current use of insulin (HCC)    Intractable back pain    CKD (chronic kidney disease) stage 4, GFR 15-29 ml/min (HCC)    Bacteremia    Acute osteomyelitis of cervical spine (HCC)    Right atrial mass        Family Medical/Psychiatric Hx:  Mother: HTN  Father: Cancer    Past Psychiatric Hx:  -Used to see psychiatrist Dr. WOODS but she moved to [place]   Treated her for depression with medications   Meds tried: Trazodone 150 mg, Effexor 150 mg, Duloxetine 30 mg  , feels effexor has been the most effective medication she has  "taken for mood.  NO hx of IP psych hospitalization  Denies hx of suicidal ideations  Guns: Denies acces to guns or firearms     Social Hx:  Living situation: Lives in a home alone in [place]  Childhood: Difficult childhood as far as parental relationship. She states her father was an alcoholic, mother was a narcissist states her parents were not affectionate with her siblings and her   Education/Employment: Retired used to work in [employer], graduated high school  Relationships: Good relationship with brother (Morgan), Joycelyn friend, and her children;  her   in 2025  Hobbies/Activity level: Likes to spend time with her friend Joycelyn and brother Morgan, they just talk and spend time together. States it is harder for her to get around and clean house by herself. She states her friend Joycelyn helps her clean her home. Patient cooks, showers, dresses and does her own laundry.   Legal Hx: None  Access to guns: Denies access to guns or firearms     Drug/Alcohol/Tobacco Hx:   Type: Tobacco    Duration: 15 pack year history of smoking cigarettes    last use: 9-10 days ago, has not required nicotine patch states she is going to quit    Does not currently use alcohol but she used to    She endorses having an edible a few time a year, she used to smoke marijuana daily when she was younger but quit years ago       Psychiatric (Physical) Examination: observed phenomenon:  Vitals:    25 0728   BP: 135/63   Pulse: 73   Resp: 18   Temp: 36.4 °C (97.6 °F)   SpO2: 95%     General: Awake, alert in no acute distress  Patient Appearance: Appropriate, fairly groomed, wearing hospital gown   Behavior: Appropriate Behavior, Calm, Cooperative  Speech.: Spontaneous, Normal rate and rhythm, Answers appropriately, normal volume  Mood Comments: \"depressed and really scared\"   Affect: appears tearful and anxious   Thought Content: Appropriate, No suicidal ideation, No thoughts of self harm,  No homicidal ideation, Contracts for " safety, Feels life is worth living  Thought Process: Logical and goal directed, No loosening of associations  Psychosis: No delusions, No hallucinations  Insight: Good insight  Judgment: good judgment  Cognition: Memory appeared intact in interview., Concentration is intact for age  and education and Fully oriented to person, place, time and circumstance.  Language: Is able to repeat phrases. and Is able to name objects.  Fund of Knowledge: AS expected for age and level of education  Neuro: no tics, tremors, dyskinesias. no dystonias. Gait appears steady.    Only those findings of potential interest to psychiatry are noted below:   Medical Conditions:   Allergies:   Allergies   Allergen Reactions    Tape Unspecified and Rash     Blisters, paper tape is ok  Other reaction(s): Rash       Medications (currently prescribed at [facility]):    Current Facility-Administered Medications:     MD Alert...Vancomycin per Pharmacy, , Other, PHARMACY TO DOSE, Payton Han M.D.    atorvastatin (Lipitor) tablet 40 mg, 40 mg, Oral, Q EVENING, Annei MILENA Esposito, D.O., 40 mg at 01/31/25 1717    busPIRone (Buspar) tablet 5 mg, 5 mg, Oral, BID, Annie MILENA Esposito, D.O., 5 mg at 01/31/25 1716    lidocaine (Asperflex) 4 % patch 3 Patch, 3 Patch, Transdermal, Q24HR, Annie Esposito, D.O.    oxyCODONE CR (OxyCONTIN) tablet 10 mg, 10 mg, Oral, Q12HRS, Annie A Vaibhav, D.O., 10 mg at 01/31/25 1718    vancomycin (Vancocin) 1,500 mg in  mL IVPB, 25 mg/kg, Intravenous, Once, Annie Esposito, D.O.    pregabalin (Lyrica) capsule 25 mg, 25 mg, Oral, Q EVENING, Annie A Vaibhav, D.O., 25 mg at 01/31/25 1718    insulin GLARGINE (Lantus,Semglee) injection, 8 Units, Subcutaneous, Q EVENING, Annie MILENA Esposito, D.O., 8 Units at 01/31/25 1718    oxyCODONE immediate release (Roxicodone) tablet 10 mg, 10 mg, Oral, Q3HRS PRN **OR** oxycodone (Oxy-IR) immediate release tablet 15 mg, 15 mg, Oral, Q3HRS PRN, 15 mg at 01/31/25 1407 **OR** HYDROmorphone  (Dilaudid) injection 0.5 mg, 0.5 mg, Intravenous, Q3HRS PRN, Annie Esposito D.O., 0.5 mg at 01/31/25 1508    hydrOXYzine HCl (Atarax) tablet 25 mg, 25 mg, Oral, 4X/DAY PRN, Annie Esposito D.O., 25 mg at 01/31/25 1508    heparin intracatheter (for DIALYSIS USE ONLY) 1,800 Units, 1,800 Units, Intracatheter, DIALYSIS PRN, Ankit Diggs M.D., 1,800 Units at 01/31/25 1035    heparin intracatheter (for DIALYSIS USE ONLY) 1,800 Units, 1,800 Units, Intracatheter, DIALYSIS PRN, Ankit Diggs M.D., 1,800 Units at 01/31/25 1035    acetaminophen (Tylenol) tablet 500 mg, 500 mg, Oral, Q6HRS, Annie Esposito DCheyO., 500 mg at 01/31/25 1716    acetaminophen (Tylenol) tablet 500 mg, 500 mg, Oral, Q6HRS PRN, Annie Esposito D.O.    torsemide (Demadex) tablet 100 mg, 100 mg, Oral, Q DAY, Ankit Diggs M.D., 100 mg at 01/31/25 0506    NS (Bolus) 0.9 % infusion 100 mL, 100 mL, Intravenous, DIALYSIS PRN, Ankit Diggs M.D.    methocarbamol (Robaxin) tablet 750 mg, 750 mg, Oral, TID, Tomi Rose D.O., 750 mg at 01/31/25 1717    petrolatum 42 % ointment, , Topical, BID, Dalton Fowler M.D., Given at 01/31/25 1719    metoprolol SR (Toprol XL) tablet 50 mg, 50 mg, Oral, Q EVENING, Jigar Cabrales M.D., 50 mg at 01/31/25 1718    amLODIPine (Norvasc) tablet 10 mg, 10 mg, Oral, DAILY, Cr Sánchez M.D., 10 mg at 01/30/25 0512    levothyroxine (Synthroid) tablet 250 mcg, 250 mcg, Oral, AM ES, Cr Sánchez M.D., 250 mcg at 01/31/25 0505    traZODone (Desyrel) tablet 150 mg, 150 mg, Oral, QHS, Cr Sánchez M.D., 150 mg at 01/30/25 2117    venlafaxine XR (Effexor XR) capsule 150 mg, 150 mg, Oral, DAILY, Cr Sánchez M.D., 150 mg at 01/31/25 0505    heparin injection 5,000 Units, 5,000 Units, Subcutaneous, Q8HRS, Annie Esposito D.O., 5,000 Units at 01/31/25 1408    senna-docusate (Pericolace Or Senokot S) 8.6-50 MG per tablet 2 Tablet, 2 Tablet, Oral, Q EVENING, 2 Tablet at 01/30/25 1745 **AND** polyethylene glycol/lytes (Miralax) Packet 1  Packet, 1 Packet, Oral, QDAY PRN, Cr Sánchez M.D., 1 Packet at 01/24/25 0512    insulin lispro (HumaLOG,AdmeLOG) subcutaneous injection, 2-9 Units, Subcutaneous, 4X/DAY ACHS, 9 Units at 01/31/25 1718 **AND** POC blood glucose manual result, , , Q AC AND BEDTIME(S) **AND** NOTIFY MD and PharmD, , , Once **AND** Administer 20 grams of glucose (approximately 8 ounces of fruit juice) every 15 minutes PRN FSBG less than 70 mg/dL, , , PRN **AND** dextrose 50% (D50W) injection 25 g, 25 g, Intravenous, Q15 MIN PRN, Cr Sánchez M.D., 25 g at 01/30/25 0934    Pharmacy Consult Request ...Pain Management Review 1 Each, 1 Each, Other, PHARMACY TO DOSE, Anny Smith, A.P.R.N.        Labs:  Lab Results   Component Value Date/Time    SODIUM 136 01/31/2025 02:06 AM    POTASSIUM 4.2 01/31/2025 02:06 AM    CHLORIDE 100 01/31/2025 02:06 AM    CO2 19 (L) 01/31/2025 02:06 AM    GLUCOSE 121 (H) 01/31/2025 02:06 AM    BUN 55 (H) 01/31/2025 02:06 AM    CREATININE 3.68 (H) 01/31/2025 02:06 AM    CREATININE 1.0 01/08/2009 04:31 PM      Lab Results   Component Value Date/Time    PROTHROMBTM 12.4 12/05/2024 01:22 PM    INR 0.93 12/05/2024 01:22 PM      Lab Results   Component Value Date/Time    WBC 7.2 01/31/2025 02:06 AM    RBC 3.09 (L) 01/31/2025 02:06 AM    HEMOGLOBIN 9.7 (L) 01/31/2025 02:06 AM    HEMATOCRIT 31.4 (L) 01/31/2025 02:06 AM    .6 (H) 01/31/2025 02:06 AM    MCH 31.4 01/31/2025 02:06 AM    MCHC 30.9 (L) 01/31/2025 02:06 AM    MPV 10.6 01/31/2025 02:06 AM    NEUTSPOLYS 73.20 (H) 01/22/2025 05:59 AM    LYMPHOCYTES 12.30 (L) 01/22/2025 05:59 AM    MONOCYTES 9.20 01/22/2025 05:59 AM    EOSINOPHILS 3.60 01/22/2025 05:59 AM    BASOPHILS 1.00 01/22/2025 05:59 AM    HYPOCHROMIA 1+ 01/28/2020 04:09 AM    ANISOCYTOSIS 1+ 10/26/2021 03:08 AM   Plt-151,000    Ca2+: 7.4 (1/31/25) Corrected Ca2+: 8.4  (1/31/25)  Bili-:0.4 (1/22/25)      AST/ALT: 136/101 (1/22/25)  Mg2+: 2.1 (1/3/25)    ANC: 10.40 (1/22/25)  TSH:4.230  (10/18/24)    Free T4: 1.12 (24)        B12-: 997 (10/18/24)        Vit D: 51 (10/18/24)      ECG: Qtc-460  Intervals                                Axis   Rate:       87                           P:          62   TX:         168                          QRS:        31   QRSD:       102                          T:          58   QT:         382   QTc:        460     Interpretive Statements   Sinus rhythm   Probable left atrial enlargement   Low voltage, extremity leads   Compared to ECG 01/10/2025 07:11:45   Low QRS voltage now present   Electronically Signed On 2025 12:26:36 PST by KIMBERLY WYLIE MD       ASSESSMENT:   Ms. aMnuel is a 69 year old female with past history of depression, HTN, DM, ESRD who goes to dialysis three times a week. She presented to the ED on 25 for bilateral upper arm pain and chronic back pain. Patient states her medical conditions has caused her lots of anxiety and she is depressed and scared about what will happen to her. She feels alone and really misses her  who  in 2020. Patient states her mother, brother, and  all  within three weeks of each other in 2020, the winter months have always been hard on her since them. No auditory or visual hallucinations. No delusions. Her appetite is good, sleep is okay although she states she sometimes wakes up multiple times a night. Patient lives alone in a home but can cook, shower, clean her home, and dress herself. She has recently found it more difficult to clean her home by herself and usually makes sure someone is home when she showers in case she falls. She has become weaker due to the chronic pain and medical conditions. She does have a lot of support from her brother and friend Joycelyn who help her clean her home and complete her errands. She denies any thoughts about harming herself or others although she does endorse sometimes thinking of stopping dialysis but she still goes because  she knows her children will be mad at her if she does not go. She denies any previous attempts of suicide. She used to see psychiatry for depression and has antidepressants prescribed which she states have been very helpful. Recommended therapy as well as medication regimen change to help with anxiety and depression. Patient understands and is agreeable to this plan.     Dx:  MDD, recurrent, moderate with anxious distress    Plan/Further Workup:   Legal status: Not applicable    Medication Managment:  -Continue Trazodone 150 mg PO nightly for insomnia   -continue Effexor 150 mg PO    daily for depression, stop cymbalta to reduce polypharmacy and drug interactions  -Start buspirone 5 mg BID for anxiety   -Continue PRN hydroxyzine 25 mg q6H as needed for anxiety    Labs Reviewed/Ordered:    Imaging Reviewed/Ordered:  Medical Tests Reviewed/Ordered:  Imaging:Review/Interpret:  Ordered Old Records/Obtain Collateral:  Old [facility] Records Summarized:  Discussed with another provider: Dr. Annie Esposito DO            Will follow  Thank you for the consult.

## 2025-01-31 NOTE — THERAPY
Occupational Therapy Contact Note    Patient Name: Anu Manuel  Age:  69 y.o., Sex:  female  Medical Record #: [id number]  Today's Date: 1/31/2025    Attempted OT eval. Pt had herself locked in the bathroom on arrival, had walked herself to the bathroom without staff and no alarm sounded. Education provided to unlock the door and alert therapist when she was done. A few minutes later, pt sounded frustrated in the bathroom and sounded like pt threw something on the ground. Opened the door to assist pt and she immediately slammed the door shut, started yelling and became hysterical. RN entered room, pt exited bathroom and denied any concern with her other ADLs. No indication for acute OT needs. Will complete order.     Caro Pimentel, OTR/L, CNS

## 2025-01-31 NOTE — PROCEDURES
Date of service: 01/31/25    Diagnosis: End-Stage Renal Disease admitted with osteomyelitis, Enterococcus faecalis bacteremia. Patient seen and examined on hemodialysis during treatment. Patient is stable, tolerating hemodialysis. Denies chest pain and shortness of breath.  Complains of worsening left arm pain again.  Orders updated as needed. Please refer to flowsheet for full details.    Access: Right IJ permacath  UF goal: 1 L    Plan: Continue hemodialysis on a Monday and Friday schedule.  If MRI with contrast is given, we should plan on an extra hemodialysis within the next 24 hours.    Ankit Diggs MD  Nephrology   [facility]

## 2025-01-31 NOTE — PROGRESS NOTES
Assumed care of patient at 1915. Received bedside report from day RN Viktoria. Patient A&Ox4, on RA, Reporting a pain level of 3/10. Call light within reach, belongings within reach, fall precautions in place, bed in lowest position. Patient does not have any other needs at this time.     POC was discussed with patient. All questions were answered. Patient verbalized understanding.

## 2025-01-31 NOTE — PROGRESS NOTES
Patient report received and assumed care. Assessed patient at 0715. Patient is Aox4 dn reports 7/10 pain in left arm. Patient is on room air. Patient is SBA to the bathroom.. Patient on diabetic diet. Patient bed is in low, locked position with bed alarm on. Standard fall precautions are in place. Personal belonging and call light are within reach. Patient reinforced to use call light when needed.      Patient left for dialysis at 0715. Report given to dialysis Rn and transport. Orange paper filled out and given to transport.

## 2025-01-31 NOTE — PROGRESS NOTES
[facility] Progress Note       HD ordered by Dr. Diggs. Treatment started at 0731 and ended at 1031.    Total Net UF: 1000mL.     Pt tolerated treatment well with no issues. See flow sheet for details.     CVC patent, locked with Heparin 1:1000 units; 1.8 mL to RED port and 1.8 mL to BLUE port post dialysis. No s/s of infection present. Dressing in place, CDI.     Report given to MINI Bragg.

## 2025-01-31 NOTE — DISCHARGE PLANNING
"HTH/SCP TCN chart review completed.  Current discharge considerations are home with home health. Noted per ID, \"Anticipate 6+ weeks IV abx from date of negative blood cxs\".  PT recommend home health and per RN note patient ambulated to bathroom with SBA, and no AD.  Per PT notes patient owns FWW/4WW/SPC.  Per flowsheet patient is on RA.     TCN will continue to follow and collaborate with discharge planning team as additional post acute needs arise. Thank you.    Completed:  PT recommends home health - 1/29  OT orders in chart   Choice obtained: [facility] (1. [facility] 2. [facility])  Pt aware of [facility] blanket referral policy  Noted [facility] has declined patient  SCP with Non[facility] PCP    "

## 2025-01-31 NOTE — PROGRESS NOTES
Late entry:    Patient arrived back to unit at 1040 for dialysis. Patient Aox4 and reports 8/10 pain in left arm.

## 2025-02-01 LAB
GLUCOSE BLD STRIP.AUTO-MCNC: 207 MG/DL (ref 65–99)
GLUCOSE BLD STRIP.AUTO-MCNC: 96 MG/DL (ref 65–99)

## 2025-02-01 PROCEDURE — 700101 HCHG RX REV CODE 250: Performed by: INTERNAL MEDICINE

## 2025-02-01 PROCEDURE — A9270 NON-COVERED ITEM OR SERVICE: HCPCS | Performed by: NEUROLOGICAL SURGERY

## 2025-02-01 PROCEDURE — 700102 HCHG RX REV CODE 250 W/ 637 OVERRIDE(OP): Performed by: STUDENT IN AN ORGANIZED HEALTH CARE EDUCATION/TRAINING PROGRAM

## 2025-02-01 PROCEDURE — A9270 NON-COVERED ITEM OR SERVICE: HCPCS | Performed by: HOSPITALIST

## 2025-02-01 PROCEDURE — 0JPT3XZ REMOVAL OF TUNNELED VASCULAR ACCESS DEVICE FROM TRUNK SUBCUTANEOUS TISSUE AND FASCIA, PERCUTANEOUS APPROACH: ICD-10-PCS

## 2025-02-01 PROCEDURE — 700102 HCHG RX REV CODE 250 W/ 637 OVERRIDE(OP): Performed by: INTERNAL MEDICINE

## 2025-02-01 PROCEDURE — 700102 HCHG RX REV CODE 250 W/ 637 OVERRIDE(OP): Performed by: NEUROLOGICAL SURGERY

## 2025-02-01 PROCEDURE — 87071 CULTURE AEROBIC QUANT OTHER: CPT

## 2025-02-01 PROCEDURE — 700102 HCHG RX REV CODE 250 W/ 637 OVERRIDE(OP): Performed by: HOSPITALIST

## 2025-02-01 PROCEDURE — 36589 REMOVAL TUNNELED CV CATH: CPT

## 2025-02-01 PROCEDURE — 99233 SBSQ HOSP IP/OBS HIGH 50: CPT | Performed by: INTERNAL MEDICINE

## 2025-02-01 PROCEDURE — 90935 HEMODIALYSIS ONE EVALUATION: CPT

## 2025-02-01 PROCEDURE — 90935 HEMODIALYSIS ONE EVALUATION: CPT | Performed by: INTERNAL MEDICINE

## 2025-02-01 PROCEDURE — A9270 NON-COVERED ITEM OR SERVICE: HCPCS | Performed by: STUDENT IN AN ORGANIZED HEALTH CARE EDUCATION/TRAINING PROGRAM

## 2025-02-01 PROCEDURE — 700111 HCHG RX REV CODE 636 W/ 250 OVERRIDE (IP): Performed by: INTERNAL MEDICINE

## 2025-02-01 PROCEDURE — 02PYX3Z REMOVAL OF INFUSION DEVICE FROM GREAT VESSEL, EXTERNAL APPROACH: ICD-10-PCS

## 2025-02-01 PROCEDURE — A9270 NON-COVERED ITEM OR SERVICE: HCPCS | Performed by: INTERNAL MEDICINE

## 2025-02-01 PROCEDURE — 770006 HCHG ROOM/CARE - MED/SURG/GYN SEMI*

## 2025-02-01 PROCEDURE — 700111 HCHG RX REV CODE 636 W/ 250 OVERRIDE (IP): Mod: JZ,TB | Performed by: INTERNAL MEDICINE

## 2025-02-01 PROCEDURE — 82962 GLUCOSE BLOOD TEST: CPT | Mod: 91

## 2025-02-01 RX ADMIN — HEPARIN SODIUM 5000 UNITS: 5000 INJECTION, SOLUTION INTRAVENOUS; SUBCUTANEOUS at 21:10

## 2025-02-01 RX ADMIN — OXYCODONE HYDROCHLORIDE 10 MG: 10 TABLET, FILM COATED, EXTENDED RELEASE ORAL at 17:14

## 2025-02-01 RX ADMIN — PETROLATUM: 420 OINTMENT TOPICAL at 17:16

## 2025-02-01 RX ADMIN — HEPARIN SODIUM 5000 UNITS: 5000 INJECTION, SOLUTION INTRAVENOUS; SUBCUTANEOUS at 06:30

## 2025-02-01 RX ADMIN — HYDROMORPHONE HYDROCHLORIDE 0.5 MG: 1 INJECTION, SOLUTION INTRAMUSCULAR; INTRAVENOUS; SUBCUTANEOUS at 19:49

## 2025-02-01 RX ADMIN — HYDROXYZINE HYDROCHLORIDE 25 MG: 50 TABLET ORAL at 14:05

## 2025-02-01 RX ADMIN — LEVOTHYROXINE SODIUM 250 MCG: 0.12 TABLET ORAL at 06:29

## 2025-02-01 RX ADMIN — SENNOSIDES AND DOCUSATE SODIUM 2 TABLET: 50; 8.6 TABLET ORAL at 17:13

## 2025-02-01 RX ADMIN — HEPARIN SODIUM 1800 UNITS: 1000 INJECTION, SOLUTION INTRAVENOUS; SUBCUTANEOUS at 14:30

## 2025-02-01 RX ADMIN — METHOCARBAMOL 750 MG: 750 TABLET ORAL at 11:58

## 2025-02-01 RX ADMIN — HYDROMORPHONE HYDROCHLORIDE 0.5 MG: 1 INJECTION, SOLUTION INTRAMUSCULAR; INTRAVENOUS; SUBCUTANEOUS at 09:46

## 2025-02-01 RX ADMIN — OXYCODONE HYDROCHLORIDE 15 MG: 15 TABLET ORAL at 18:41

## 2025-02-01 RX ADMIN — METHOCARBAMOL 750 MG: 750 TABLET ORAL at 17:14

## 2025-02-01 RX ADMIN — OXYCODONE HYDROCHLORIDE 15 MG: 15 TABLET ORAL at 11:58

## 2025-02-01 RX ADMIN — METHOCARBAMOL 750 MG: 750 TABLET ORAL at 06:30

## 2025-02-01 RX ADMIN — HYDROXYZINE HYDROCHLORIDE 25 MG: 50 TABLET ORAL at 21:09

## 2025-02-01 RX ADMIN — INSULIN GLARGINE-YFGN 8 UNITS: 100 INJECTION, SOLUTION SUBCUTANEOUS at 17:14

## 2025-02-01 RX ADMIN — ACETAMINOPHEN 500 MG: 500 TABLET ORAL at 00:17

## 2025-02-01 RX ADMIN — BUSPIRONE HYDROCHLORIDE 5 MG: 10 TABLET ORAL at 17:13

## 2025-02-01 RX ADMIN — TORSEMIDE 100 MG: 100 TABLET ORAL at 06:32

## 2025-02-01 RX ADMIN — OXYCODONE HYDROCHLORIDE 10 MG: 10 TABLET ORAL at 00:16

## 2025-02-01 RX ADMIN — TRAZODONE HYDROCHLORIDE 150 MG: 50 TABLET ORAL at 21:08

## 2025-02-01 RX ADMIN — PREGABALIN 25 MG: 25 CAPSULE ORAL at 17:14

## 2025-02-01 RX ADMIN — OXYCODONE HYDROCHLORIDE 10 MG: 10 TABLET ORAL at 21:07

## 2025-02-01 RX ADMIN — OXYCODONE HYDROCHLORIDE 15 MG: 15 TABLET ORAL at 15:16

## 2025-02-01 RX ADMIN — OXYCODONE HYDROCHLORIDE 15 MG: 15 TABLET ORAL at 08:42

## 2025-02-01 RX ADMIN — LIDOCAINE 3 PATCH: 4 PATCH TOPICAL at 16:07

## 2025-02-01 RX ADMIN — VENLAFAXINE HYDROCHLORIDE 150 MG: 75 CAPSULE, EXTENDED RELEASE ORAL at 06:29

## 2025-02-01 RX ADMIN — INSULIN LISPRO 3 UNITS: 100 INJECTION, SOLUTION INTRAVENOUS; SUBCUTANEOUS at 17:14

## 2025-02-01 RX ADMIN — OXYCODONE HYDROCHLORIDE 10 MG: 10 TABLET, FILM COATED, EXTENDED RELEASE ORAL at 06:35

## 2025-02-01 RX ADMIN — PETROLATUM: 420 OINTMENT TOPICAL at 06:31

## 2025-02-01 RX ADMIN — BUSPIRONE HYDROCHLORIDE 5 MG: 10 TABLET ORAL at 06:29

## 2025-02-01 RX ADMIN — METOPROLOL SUCCINATE 50 MG: 50 TABLET, EXTENDED RELEASE ORAL at 17:13

## 2025-02-01 RX ADMIN — ACETAMINOPHEN 500 MG: 500 TABLET ORAL at 17:13

## 2025-02-01 RX ADMIN — ACETAMINOPHEN 500 MG: 500 TABLET ORAL at 11:58

## 2025-02-01 RX ADMIN — ATORVASTATIN CALCIUM 40 MG: 40 TABLET, FILM COATED ORAL at 18:41

## 2025-02-01 ASSESSMENT — PAIN DESCRIPTION - PAIN TYPE
TYPE: ACUTE PAIN

## 2025-02-01 ASSESSMENT — ENCOUNTER SYMPTOMS
NERVOUS/ANXIOUS: 0
MYALGIAS: 1
DIARRHEA: 0
NAUSEA: 0
CHILLS: 0
ABDOMINAL PAIN: 0
NERVOUS/ANXIOUS: 1
SHORTNESS OF BREATH: 0
WEAKNESS: 1
VOMITING: 0
CONSTIPATION: 0
BACK PAIN: 1
FEVER: 0
NECK PAIN: 1

## 2025-02-01 NOTE — PROGRESS NOTES
Pharmacy Vancomycin Kinetics Note for 1/31/2025     69 y.o. female on Vancomycin day # 4     Vancomycin Indication (Trough based Dosing): Osteomyelitis (goal trough 10-15) (Enterococcus faecium bacteremia, c/f endocarditis)    Provider specified end date: 03/12/25    Active Antibiotics (From admission, onward)      Ordered     Ordering Provider       Fri Jan 31, 2025  5:21 PM    01/31/25 1721  vancomycin (Vancocin) 1,500 mg in  mL IVPB  (vancomycin (VANCOCIN) IV (LD + Maintenance))  ONCE         Annie Esposito D.O.       Fri Jan 31, 2025  9:37 AM    01/31/25 0937  MD Alert...Vancomycin per Pharmacy  PHARMACY TO DOSE        Question:  Indication(s) for vancomycin?  Answer:  Osteomyelitis    Payton Han M.D.            Dosing Weight: 58.4 kg (128 lb 12 oz)    Admission History: Admitted on 1/22/2025 for Radiculopathy affecting upper extremity [M54.10]  Intractable back pain [M54.9]  Pertinent history: Patient was admitted for worsening back pain and arm pain, with MRI c-spine findings concerning for discitis/osteomyelitis. Repeat MRI c-spine ordered for persistent pain. Patinet is afebrile, no leukocytosis. 1 of 2 blood cultures grew E.faecium.Repeat blood culture on 1/29 is NGTD. ID is following, vancomycin initiated.    Allergies:     Tape     Pertinent cultures to date:     Results       Procedure Component Value Units Date/Time    BLOOD CULTURE [[id number]]  (Abnormal)  (Susceptibility) Collected: 01/27/25 1859    Order Status: Completed Specimen: Blood from Peripheral Updated: 01/31/25 0730     Significant Indicator POS     Source BLD     Site PERIPHERAL     Culture Result Growth detected by automated blood culture system.  01/28/2025  12:13        Enterococcus faecium  If daptomycin is used for E. faecium treatment, high-dose  regimen is recommended.  The susceptibility profile for this organism indicates that  Streptomycin would not be an effective component of  combination therapy.         "Staphylococcus epidermidis  POSSIBLE CONTAMINANT: Isolated from one set only, please  correlate with clinical condition. Contact the Microbiology  department within 48 hr if susceptibility testing is needed.      BLOOD CULTURE [[id number]] Collected: 01/29/25 1828    Order Status: Completed Specimen: Blood from Peripheral Updated: 01/29/25 2008     Significant Indicator NEG     Source BLD     Site PERIPHERAL     Culture Result No Growth  Note: Blood cultures are incubated for 5 days and  are monitored continuously.Positive blood cultures  are called to the RN and reported as soon as  they are identified.      BLOOD CULTURE [[id number]] Collected: 01/29/25 1828    Order Status: Completed Specimen: Blood from Peripheral Updated: 01/29/25 2008     Significant Indicator NEG     Source BLD     Site PERIPHERAL     Culture Result No Growth  Note: Blood cultures are incubated for 5 days and  are monitored continuously.Positive blood cultures  are called to the RN and reported as soon as  they are identified.      BLOOD CULTURE [[id number]] Collected: 01/28/25 0241    Order Status: Completed Specimen: Blood from Peripheral Updated: 01/28/25 0608     Significant Indicator NEG     Source BLD     Site PERIPHERAL     Culture Result No Growth  Note: Blood cultures are incubated for 5 days and  are monitored continuously.Positive blood cultures  are called to the RN and reported as soon as  they are identified.              Labs:     Estimated Creatinine Clearance: 13.7 mL/min (A) (by C-G formula based on SCr of 3.68 mg/dL (H)).  Recent Labs     01/31/25  0206   WBC 7.2     Recent Labs     01/30/25  1237 01/31/25  0206   BUN 55* 55*   CREATININE 3.39* 3.68*   ALBUMIN  --  2.8*       Intake/Output Summary (Last 24 hours) at 1/31/2025 1742  Last data filed at 1/31/2025 1031  Gross per 24 hour   Intake 740 ml   Output 1500 ml   Net -760 ml      /64   Pulse 78   Temp 36.4 °C (97.5 °F) (Temporal)   Resp 17   Ht 1.676 m (5' 6\")  "  Wt 58.4 kg (128 lb 12 oz)   SpO2 98%  Temp (24hrs), Av.3 °C (97.4 °F), Min:36.1 °C (96.9 °F), Max:36.7 °C (98 °F)      List concerns for Vancomycin clearance:     ESRD    Pharmacokinetics:    Trough kinetics:   Recent Labs     25  1612   VANCORANDOM 10.1       A/P:     -  Vancomycin dose: vancomycin IV 1500 mg x 1    -  Next vancomycin level(s):    -TBD    -  Comments: Patient is on HD on  and , vanco random level post dialysis was 10.1. Patient may need extra HD if MRI with contrast is given per nephrology. Pharmacy will continue to follow, and dose on dialysis days if appropriate.     Shelbi Araiza, PharmD, BCPS

## 2025-02-01 NOTE — PROGRESS NOTES
Patient going to dialysis. Patient left with transport on hospital bed. Report given to dialysis RN. Report given to transport and transport given range dialysis sheet.

## 2025-02-01 NOTE — CARE PLAN
The patient is Stable - Low risk of patient condition declining or worsening    Shift Goals  Clinical Goals: patient will maintain pain level of 3/10 or below during course of shift with PRN pain medications as per ordered  Patient Goals: pain, anxiety  Family Goals: RILEY    Progress made toward(s) clinical / shift goals:      Problem: Knowledge Deficit - Standard  Goal: Patient and family/care givers will demonstrate understanding of plan of care, disease process/condition, diagnostic tests and medications  Outcome: Progressing    Patient educated on plan of care, medications, and procedures. Patient is Aox4. Patient verbalizes understanding of care. Will continue to update patient on Plan of care during shift.     Problem: Pain - Standard  Goal: Alleviation of pain or a reduction in pain to the patient’s comfort goal  Outcome: Progressing    Patient had 7/10 pain in left arm during shift. Pain managed with q 3 hr oxycodone 15mg and dilaudid 0.5ml post 1 hr break through for pain.      Problem: Fall Risk  Goal: Patient will remain free from falls  Outcome: Progressing    Patient remained free of all during shift. Patient is low fall risk. Patient is SBA. Patient bed is in low, locked position with bed alarm on. Standard fall precautions are in place. Personal belonging and call light are within reach. Patient reinforced to use call light when needed.     Problem: Psychosocial Needs:  Goal: Level of anxiety will decrease  Outcome: Progressing    Patient anxiety managed with Atarax PRN q 4 hrs as needed.      Patient is not progressing towards the following goals:

## 2025-02-01 NOTE — PROCEDURES
Date of service: 02/01/25    Diagnosis: End-Stage Renal Disease, admitted with cervical spine osteomyelitis, found to have Enterococcus faecalis bacteremia. Patient seen and examined on hemodialysis during treatment. Patient is stable, tolerating hemodialysis. Denies chest pain and shortness of breath. Orders updated as needed. Please refer to flowsheet for full details.    Access: Right IJ permacath  UF goal: 1 L    Plan: Extra dialysis today as the patient had MRI with gadolinium contrast last night.  Plan on tunneled dialysis catheter removal for line holiday after dialysis today as the patient was bacteremic.  She does have decent residual kidney function, so we can wait until blood cultures are clear before replacing tunneled dialysis catheter.    Discussed with Dr. Annie Diggs MD  Nephrology   [facility]

## 2025-02-01 NOTE — PROGRESS NOTES
[facility] Services Progress Notes    UF net removed: 1000mL     HD treatment completed as ordered per Dr Diggs for 3hrs. Started at 1130, ended at 1430.     Patient tolerated dialysis treatment without complications. See HD flowsheets for reference.     Post HD vital signs stable. CVC patent with good flow. Heparin 1000units/mL locked of 1.8mL on each blue and red port. Report given to Micheal MORSE.

## 2025-02-01 NOTE — PROGRESS NOTES
Patient report received and assumed care. Assessed patient at 0745. Patient is Aox2, oriented to person and place, disoriented to time and situation. Patient reports 7/10 pain in right hip. Patient is on room air. Patient has female wick in place. Patient incontinent of bowels. Patient is wheelchair bound at baseline and max assist. Patient high fall risk. Patient bed is in low, locked position with bed alarm on. Standard fall precautions are in place. Personal belonging and call light are within reach. Patient reinforced to use call light when needed.

## 2025-02-01 NOTE — CARE PLAN
The patient is Stable - Low risk of patient condition declining or worsening    Shift Goals  Clinical Goals: Patient will maintain pain level of 5 or below during course of shift with PNR pain medicaiton, rest, and heat packs.  Patient Goals: pain  Family Goals: RILEY    Progress made toward(s) clinical / shift goals:      Problem: Knowledge Deficit - Standard  Goal: Patient and family/care givers will demonstrate understanding of plan of care, disease process/condition, diagnostic tests and medications  Outcome: Progressing    Patient educated on plan of care, medications, and procedures. Patient is Aox4. Patient verbalizes understanding of care. Will continue to update patient on Plan of care during shift.     Problem: Pain - Standard  Goal: Alleviation of pain or a reduction in pain to the patient’s comfort goal  Outcome: Progressing    Patient had 7-10/10 pain during course of shift. Patient pain not being managed. Patient pain managed to about 6/10 pain with q 3 hr 15mg oxycodone and 0.5 dilaudid post 1 hr breakthrough and heat packs.  Notified of pain. Patient prescribed lidocaine patches to cover pain.      Problem: Fall Risk  Goal: Patient will remain free from falls  Outcome: Progressing    Patient remained free of falls during shift. Patient is low fall risk. Patient is SBA to bathroom. Patient has shuffle gait. Patient bed is in low, locked position with bed alarm on. Standard fall precautions are in place. Personal belonging and call light are within reach. Patient reinforced to use call light when needed.       Patient is not progressing towards the following goals:

## 2025-02-01 NOTE — CARE PLAN
The patient is Stable - Low risk of patient condition declining or worsening    Shift Goals  Clinical Goals: Manage patient's  pain to tolerable level,maintain a safe environment and free from falls during the night shift  Patient Goals: Pain management to tolerable level during the night shift  Family Goals: RILEY no family member present    Progress made toward(s) clinical / shift goals:  on going    Patient is  progressing towards the following goals:  Problem: Knowledge Deficit - Standard  Goal: Patient and family/care givers will demonstrate understanding of plan of care, disease process/condition, diagnostic tests and medications  Description: Target End Date:  1-3 days or as soon as patient condition allows    Document in Patient Education    1.  Patient and family/caregiver oriented to unit, equipment, visitation policy and means for communicating concern  2.  Complete/review Learning Assessment  3.  Assess knowledge level of disease process/condition, treatment plan, diagnostic tests and medications  4.  Explain disease process/condition, treatment plan, diagnostic tests and medications  Outcome: Progressing     Problem: Fall Risk  Goal: Patient will remain free from falls  Description: Target End Date:  Prior to discharge or change in level of care    Document interventions on the [place]sene[place] Traore Fall Risk Assessment    1.  Assess for fall risk factors  2.  Implement fall precautions  Outcome: Progressing     Problem: Bowel Elimination  Goal: Establish and maintain regular bowel function  Description: Target End Date:  Prior to discharge or change in level of care    1.   Note date of last BM  2.   Educate about diet, fluid intake, medication and activity to promote bowel function  3.   Educate signs and symptoms of constipation and interventions to implement  4.   Pharmacologic bowel management per provider order  5.   Regular toileting schedule  6.   Upright position for toileting  7.   High fiber diet  8.    Encourage hydration  9.   Collaborate with Clinical Dietician  10. Care and maintenance of ostomy if applicable  Outcome: Progressing     Problem: Pain - Standard  Goal: Alleviation of pain or a reduction in pain to the patient’s comfort goal  Description: Target End Date:  Prior to discharge or change in level of care    Document on Vitals flowsheet    1.  Document pain using the appropriate pain scale per order or unit policy  2.  Educate and implement non-pharmacologic comfort measures (i.e. relaxation, distraction, massage, cold/heat therapy, etc.)  3.  Pain management medications as ordered  4.  Reassess pain after pain med administration per policy  5.  If opiods administered assess patient's response to pain medication is appropriate per POSS sedation scale  6.  Follow pain management plan developed in collaboration with patient and interdisciplinary team (including palliative care or pain specialists if applicable)  Outcome: Progressing     Problem: Vancomycin  Goal: Labs Reviewed  Outcome: Progressing

## 2025-02-01 NOTE — PROGRESS NOTES
Hospital Medicine Daily Progress Note    Date of Service  2/1/2025    Chief Complaint  Anu Manuel is a 69 y.o. female admitted 1/22/2025 with neck pain     Hospital Course  Anu Manuel is a 69 y.o. female with past medical history of insulin-dependent diabetes mellitus, ESRD on dialysis, CAD, recent admission for acute on chronic back pain who presented 1/22/2025 with bilateral arm pain.  MRI of the cervical spine from 1/8/2025 revealed abnormal bone marrow signal with raising the possibility of infection and short-term follow-up imaging was recommended. Nephrology consulted for scheduled dialysis.  MRI cervical spine showed C7-T1 findings concerning for discitis/osteomyelitis with moderate spinal canal stenosis at C6-7.  Neurosurgery was consulted recommended IR bone biopsy and conservative management.  Did not recommend surgery at this time.  Discussed with IR however they do not perform cervical bone biopsies.  Surgery then recommended ID consults and conservative treatment of antibiotics.  Patient was positive for Enterococcus faecium bacteremia and infectious disease was consulted started on IV antibiotics.  Initial echocardiogram was concerning for endocarditis and cardiology was consulted for RAFIQ which was performed 1/30 showed heterogenous echogenic density seen attached to the posterior/inferior right atrial wall by entrance of IVC with multiple friable small linear densities seen coming off.  Due to severe ongoing pain repeat MRI was done that was stable as well as venous ultrasound of her upper extremity which was negative.    During hospitalization patient with severe anxiety.  Psychiatry was consulted and recommended to start BuSpar 5 mg twice daily with Atarax as needed and psychotherapy.    Interval Problem Update  1/31 vital stable  WBC 7.2, hemoglobin 9.7  Repeat blood cultures 1/29 negative to date  Patient went for dialysis this morning  Discussed with infectious disease  since patient has ongoing back and arm pain recommended repeat MRI C-spine which has been ordered  Discussed with psychiatry recommended to discontinue Cymbalta and start BuSpar 5 mg twice daily with the Atarax as needed.  They have also ordered psychotherapy for her  Patient again hysterical at time of my evaluation.  Now reports that pain in both her arms is back in addition to between her shoulder blades. She is frequently using oxy 15 and dilaudid. I ordered lidocaine patches for her back and shoulders.  Start oxycodone CR twice daily to help with pain control    2/1 hypertensive this morning   Labs pending   MRI cervical spine continues to show discitis and osteomyelitis no significant epidural phlegmon or abscess, with high-level foraminal stenosis  Discussed with nephrology since patient got MRI with contrast yesterday we will plan for dialysis today  Discussed with infectious disease who recommended to have dialysis catheter removed and due to line holiday for 72 hours with repeat blood cultures  Repeat blood cultures 1/29 negative to date  Discussed with interventional radiology who will remove dialysis catheter this afternoon  Patient's son at bedside.  Patient reports that her pain is much better today after starting the long-acting pain medications.  She continues to have pain in her mid back into her shoulders.  Discussed results of MRI and plan of care moving forward, all questions answered.    I have discussed this patient's plan of care and discharge plan at IDT rounds today with Case Management, Nursing, Nursing leadership, and other members of the IDT team.    Consultants/Specialty  nephrology  Infectious disease  Cardiology  Psychiatry    Code Status  DNAR/DNI    Disposition  The patient is not medically cleared for discharge to home or a post-acute facility.      I have placed the appropriate orders for post-discharge needs.    Review of Systems  Review of Systems   Constitutional:  Positive for  malaise/fatigue. Negative for chills and fever.   Respiratory:  Negative for shortness of breath.    Cardiovascular:  Negative for chest pain.   Gastrointestinal:  Negative for abdominal pain and nausea.   Musculoskeletal:  Positive for back pain, joint pain, myalgias and neck pain.   Neurological:  Positive for weakness.   Psychiatric/Behavioral:  The patient is nervous/anxious.         Physical Exam  Temp:  [36.2 °C (97.1 °F)-36.6 °C (97.8 °F)] 36.4 °C (97.5 °F)  Pulse:  [67-81] 71  Resp:  [17-18] 18  BP: (121-160)/(64-82) 160/79  SpO2:  [95 %-99 %] 95 %    Physical Exam  Vitals and nursing note reviewed. Exam conducted with a chaperone present (Son and grandkids at bedside).   Constitutional:       General: She is not in acute distress.     Appearance: She is ill-appearing.   HENT:      Mouth/Throat:      Pharynx: Oropharynx is clear.   Eyes:      Conjunctiva/sclera: Conjunctivae normal.   Cardiovascular:      Rate and Rhythm: Normal rate and regular rhythm.      Heart sounds: Normal heart sounds.   Pulmonary:      Effort: Pulmonary effort is normal. No respiratory distress.      Breath sounds: No wheezing.   Abdominal:      General: There is distension.      Palpations: Abdomen is soft.      Tenderness: There is no abdominal tenderness.   Musculoskeletal:         General: Tenderness present.      Right lower leg: No edema.      Left lower leg: No edema.   Skin:     General: Skin is warm.   Neurological:      General: No focal deficit present.      Mental Status: She is alert and oriented to person, place, and time.      Comments: Moving all extremities spontaneously   Psychiatric:         Mood and Affect: Mood normal.         Behavior: Behavior is cooperative.         Fluids    Intake/Output Summary (Last 24 hours) at 2/1/2025 1545  Last data filed at 2/1/2025 1457  Gross per 24 hour   Intake 1070 ml   Output 1500 ml   Net -430 ml            Laboratory  Recent Labs     01/31/25  0206   WBC 7.2   RBC 3.09*    HEMOGLOBIN 9.7*   HEMATOCRIT 31.4*   .6*   MCH 31.4   MCHC 30.9*   RDW 63.0*   PLATELETCT 151*   MPV 10.6       Recent Labs     01/30/25  1237 01/31/25  0206   SODIUM 138 136   POTASSIUM 4.6 4.2   CHLORIDE 102 100   CO2 24 19*   GLUCOSE 128* 121*   BUN 55* 55*   CREATININE 3.39* 3.68*   CALCIUM 8.1* 7.4*                   Imaging  MR-CERVICAL SPINE-WITH & W/O   Final Result      1.  Postoperative changes as described above.   2.  C7-T1 discitis and osteomyelitis. No significant epidural phlegmon or epidural abscess pocket.   3.  C7-T1 moderate disc/osteophyte change with mild central stenosis.   4.  Multilevel foraminal stenoses as outlined above.   5.  No myelopathic cord signal.      EC-RAFIQ W/O CONT   Final Result      US-EXTREMITY VENOUS UPPER UNILAT LEFT   Final Result      EC-ECHOCARDIOGRAM COMPLETE W/O CONT   Final Result      MR-CERVICAL SPINE-WITH & W/O   Final Result         1.  Postoperative changes in the cervical spine as described above.      2.  Abnormal signal is noted in the prevertebral soft tissues extending from the mid C2 to the C7-T1 level. Abnormal marrow signal is noted at the C7-T1 level with mild enhancement of the intervertebral disc space at this level. These findings are    concerning for discitis-osteomyelitis.      3.  There is moderate spinal canal stenosis at C6-7.         US-RUQ   Final Result      1.  Prior cholecystectomy.      2.  Common bile duct diameter measured at 12 mm with some intrahepatic biliary ductal dilatation. This may be related to presence of prior cholecystectomy.      3.  The liver is heterogeneous consistent with fatty change versus hepatocellular dysfunction.      DX-CHEST-PORTABLE (1 VIEW)   Final Result         1.  No acute cardiopulmonary disease.   2.  Atherosclerosis      IR-CVC TUNNEL W/O PORT REMOVAL    (Results Pending)        Assessment/Plan  * Radiculopathy affecting upper extremity- (present on admission)  Assessment & Plan  Her pain has been  debilitating despite Neurontin and Cymbalta.  She had the abnormal MRI noted below.  Because of this the MRI will be repeated as she may benefit from spine surgery consultation for either inpatient or outpatient management based on the results.  .  She will be given oral narcotics and no further IV narcotics at this time in order to start the transition of discharge home eventually on oral   Continue on Cymbalta, gabapentin  MRI C-spine showed osteomyelitis/discitis  Requiring frequent IV narcotics, close monitoring for toxicity while on IV controlled substance.   Neurosurgery recommending conservative management, no surgical intervention at this time  Infectious disease consulted    Bacteremia- (present on admission)  Assessment & Plan  1/2 blood cultures Enterococcus  Infectious disease consulted  -Changed to vancomycin  -Echocardiogram showed endocarditis  -Repeat blood cultures per ID  Remove HD catheter 2/1, plan for 72-hour line holiday    Right atrial mass- (present on admission)  Assessment & Plan  TTE showed possible tricuspid and mitral valve endocarditis  Cardiology consulted, RAFIQ 1/30- showed right atrial mass with mobile density   Unclear if this is mass versus clot versus infection  Discussed with IR, CT would not delineate this  Infectious disease following  -Will treat for endocarditis    Acute osteomyelitis of cervical spine (HCC)- (present on admission)  Assessment & Plan  MRI showed C7-T1 findings concerning for discitis osteomyelitis  Infectious disease consulted  -Vancomycin  -repeat C spine mri pending  Follow-up blood culture result  Neurosurgery recommended conservative management with IV antibiotics, no surgical intervention  Discussed with IR, unable to perform cervical spine bone biopsy    CKD (chronic kidney disease) stage 4, GFR 15-29 ml/min ([facility])- (present on admission)  Assessment & Plan  Continue on torsemide 100 mg daily  Repeat BMP in a.m. to monitor renal function  Nephrology  following for dialysis needs      Intractable back pain- (present on admission)  Assessment & Plan  Also complaining of severe burning neuropathic pain in her arm  Increase gabapentin 300 mg daily, continue duloxetine  Start scheduled Tylenol 500 mg every 6 hours  Continue oral and IV opiates as needed    Type 2 diabetes mellitus, with long-term current use of insulin ([facility])- (present on admission)  Assessment & Plan  On sliding scale and glargine  Monitor of for hypoglycemic episode    DNR (do not resuscitate)- (present on admission)  Assessment & Plan  Per her wishes  She has been followed by palliative care in the past        ESRD needing dialysis ([facility])- (present on admission)  Assessment & Plan  She has a left arm fistula  Nephrology will be consulted for dialysis    CAD S/P percutaneous coronary angioplasty- (present on admission)  Assessment & Plan  History of    Primary hypertension- (present on admission)  Assessment & Plan  Continue Norvasc and Toprol with holding parameters         VTE prophylaxis: heparin SC    I have performed a physical exam and reviewed and updated ROS and Plan today (2/1/2025). In review of yesterday's note (1/31/2025), there are no changes except as documented above.

## 2025-02-01 NOTE — CONSULTS
"PSYCHIATRIC CONSULTATION:  Reason for admission:     Reason for consult: Anxious and overwhelmed with current medical status   Requesting Physician: Annie Esposito DO  Supervising Physician:Bri Velazquez     Legal status: Not applicable      Chief Complaint: Chronic back pain and bilateral upper extremity pain     HPI:Ms. Anu Manuel is a 69 year old female with history insulin dependent diabetes mellitus, hypertension, coronary artery disease, and end stage renal disease who is dialyzed presented to ED on 1/22/25 for bilateral upper extremity pain for four weeks. Patient has had chronic back pain and upper extremity pain and has been in and out of the hospital multiple times in the past few months for pain management. Patient was seen at bedside, she was alert and oriented to person, time, place, and situation. She was very tearful and anxious sitting in bed wearing hospital gown. Patient had just returned from dialysis. Patient states she feels \"depressed and really scared,\" she is really scared about her health. She states she used to have panic attacks but has not had any in years. She describes her panic attacks as not being able to breathe, she is unable to take a deep breath and feels a tightness in her chest. I spoke with patient about possible mindfulness exercises that could help with this and patient is agreeable as taking her through taking some deep breathes and laying back in bed helped with the anxiety. Patient states her appetite has been good, her sleep is okay. She states the trazodone helps her sleep however she wakes up multiple times a night and sometimes is unable to go back to sleep. She states her energy \"sucks\" with her pain and infection she has currently. It is harder for her to do things around the house. She normally cleans her home, showers, bathes and cooks okay. Since her pain has worsened she has had her friend Hirsch help with keeping cleaning and she usually showers " "while her brother Morgan Hirsch are at her house just in case she falls. She denies any thoughts of suicide or homicide. She states she has never attempted suicide. She has felt depressed especially during the winter months as her mother,  and brother  all within 3 weeks of each other in 2020. She states she sees a psychiatrist whom has prescribed her antidepressants that help her with her depression. She states she tried seeing a therapist in the past but she did not like her so she never saw a therapist again. She is however open to seeing a therapist outpatient and psychotherapy while in the hospital.            Psychiatric Review of Systems:  Mood: Depressed and scared  Sleep: Okay, her trazadone helps her fall asleep, she does state sometimes its hard for her to stay asleep  Appetite: Good, she has not noticed any significant weight loss or gain  Energy: \"Sucks because of this infection and pain\"   SI/HI: Denies SI or HI, has thought about not going to dialysis but she goes because her children would get mad at her if she did not go  Hallucinations: Denies auditory of visual hallucinations. Denies delusions.   Bipolar: Denies having any bipolar, darya symptoms in the past.   Trauma: Denies having any recurrent nightmares or flashbacks.      Medical Review of Systems: as reported by pt. All systems reviewed. Only those found to be + are noted below. All others are negative.   Cardiovascular: Endorses some chest tightness  Respiratory: Endorses shortness of breath when she has her panic attacks  Gastrointestinal: Denies any abdominal pain, nausea, vomiting, diarrhea  Neurology: alert and oriented x 4, no tremors or headaches     Past Medical Hx:   Problem List       Patient Active Problem List   Diagnosis    Vitamin D deficiency    Vertigo    Type 1 diabetes mellitus with hyperglycemia (HCC)    Diabetic polyneuropathy (CMS-HCC)    Lumbar spinal stenosis    Cellulitis    Left hip pain    " Post-operative wound abscess    Tobacco abuse    Hypoglycemia    Primary hypertension    Anxiety    Nausea & vomiting    Dyslipidemia    GERD (gastroesophageal reflux disease)    Lung nodule    Hemoptysis    CAD S/P percutaneous coronary angioplasty    Pain in the chest    Elevated liver enzymes    Anemia    Hyponatremia    Elevated troponin    Epigastric pain    Generalized abdominal pain    Jaundice    Bilious vomiting with nausea    Cholecystitis    Anasarca    Fluid collection at surgical site    Elevated LFTs    Constipation    Liver failure without hepatic coma (HCC)    Long-term insulin use (HCC)    Senile purpura (HCC)    BMI 23.0-23.9, adult    Moderate episode of recurrent major depressive disorder (HCC)    Aortic atherosclerosis (HCC)    Cholestatic liver disease    Uncomplicated opioid dependence (HCC)    Chronic low back pain    Proteinuria    High anion gap metabolic acidosis    Chronic obstructive pulmonary disease    ESRD needing dialysis (HCC)    MDD (major depressive disorder)    Right inferior pubic rami fracture    Syncope    Age-related osteoporosis with current pathological fracture with routine healing    History of medication noncompliance    Hypothyroidism, postsurgical    Acute right-sided thoracic back pain    Steatohepatitis    Anemia due to chronic kidney disease, on chronic dialysis (HCC)    Radiculopathy affecting upper extremity    DNR (do not resuscitate)    Type 2 diabetes mellitus, with long-term current use of insulin (HCC)    Intractable back pain    CKD (chronic kidney disease) stage 4, GFR 15-29 ml/min (HCC)    Bacteremia    Acute osteomyelitis of cervical spine (HCC)    Right atrial mass            Family Medical/Psychiatric Hx:  Mother: HTN  Father: Cancer     Past Psychiatric Hx:  -Used to see psychiatrist Dr. WOODS but she moved to [place]              Treated her for depression with medications   Meds tried: Trazodone 150 mg, Effexor 150 mg, Duloxetine 30 mg  , feels  "effexor has been the most effective medication she has taken for mood.  NO hx of IP psych hospitalization  Denies hx of suicidal ideations  Guns: Denies acces to guns or firearms      Social Hx:  Living situation: Lives in a home alone in [place]  Childhood: Difficult childhood as far as parental relationship. She states her father was an alcoholic, mother was a narcissist states her parents were not affectionate with her siblings and her   Education/Employment: Retired used to work in [email or web address], graduated high school  Relationships: Good relationship with brother (Morgan), Joycelyn friend, and her children;  her   in 2025  Hobbies/Activity level: Likes to spend time with her friend Joycelyn and brother Morgan, they just talk and spend time together. States it is harder for her to get around and clean house by herself. She states her friend Joycelyn helps her clean her home. Patient cooks, showers, dresses and does her own laundry.   Legal Hx: None  Access to guns: Denies access to guns or firearms      Drug/Alcohol/Tobacco Hx:   Type: Tobacco    Duration: 15 pack year history of smoking cigarettes    last use: 9-10 days ago, has not required nicotine patch states she is going to quit     Does not currently use alcohol but she used to     She endorses having an edible a few time a year, she used to smoke marijuana daily when she was younger but quit years ago         Psychiatric (Physical) Examination: observed phenomenon:      Vitals:     25 0728   BP: 135/63   Pulse: 73   Resp: 18   Temp: 36.4 °C (97.6 °F)   SpO2: 95%      General: Awake, alert in no acute distress  Patient Appearance: Appropriate, fairly groomed, wearing hospital gown   Behavior: Appropriate Behavior, Calm, Cooperative  Speech.: Spontaneous, Normal rate and rhythm, Answers appropriately, normal volume  Mood Comments: \"depressed and really scared\"   Affect: appears tearful and anxious   Thought Content: Appropriate, No suicidal " ideation, No thoughts of self harm,  No homicidal ideation, Contracts for safety, Feels life is worth living  Thought Process: Logical and goal directed, No loosening of associations  Psychosis: No delusions, No hallucinations  Insight: Good insight  Judgment: good judgment  Cognition: Memory appeared intact in interview., Concentration is intact for age  and education and Fully oriented to person, place, time and circumstance.  Language: Is able to repeat phrases. and Is able to name objects.  Fund of Knowledge: AS expected for age and level of education  Neuro: no tics, tremors, dyskinesias. no dystonias. Gait appears steady.     Only those findings of potential interest to psychiatry are noted below:   Medical Conditions:   Allergies:   Allergies         Allergies   Allergen Reactions    Tape Unspecified and Rash       Blisters, paper tape is ok  Other reaction(s): Rash          Medications (currently prescribed at [facility]):    Current Medications      Current Facility-Administered Medications:     MD Alert...Vancomycin per Pharmacy, , Other, PHARMACY TO DOSE, Payton Han M.D.    atorvastatin (Lipitor) tablet 40 mg, 40 mg, Oral, Q EVENING, Annie Esposito, D.O., 40 mg at 01/31/25 1717    busPIRone (Buspar) tablet 5 mg, 5 mg, Oral, BID, Annie MILENA Esposito, D.O., 5 mg at 01/31/25 1716    lidocaine (Asperflex) 4 % patch 3 Patch, 3 Patch, Transdermal, Q24HR, Annie Esposito, D.O.    oxyCODONE CR (OxyCONTIN) tablet 10 mg, 10 mg, Oral, Q12HRS, Annie Esposito, D.O., 10 mg at 01/31/25 1718    vancomycin (Vancocin) 1,500 mg in  mL IVPB, 25 mg/kg, Intravenous, Once, Annie Esposito, D.O.    pregabalin (Lyrica) capsule 25 mg, 25 mg, Oral, Q EVENING, Annie MILENA Esposito, D.O., 25 mg at 01/31/25 1718    insulin GLARGINE (Lantus,Semglee) injection, 8 Units, Subcutaneous, Q EVENING, Annie Esposito, D.O., 8 Units at 01/31/25 1718    oxyCODONE immediate release (Roxicodone) tablet 10 mg, 10 mg, Oral, Q3HRS PRN **OR**  oxycodone (Oxy-IR) immediate release tablet 15 mg, 15 mg, Oral, Q3HRS PRN, 15 mg at 01/31/25 1407 **OR** HYDROmorphone (Dilaudid) injection 0.5 mg, 0.5 mg, Intravenous, Q3HRS PRN, Annie Esposito DCheyO., 0.5 mg at 01/31/25 1508    hydrOXYzine HCl (Atarax) tablet 25 mg, 25 mg, Oral, 4X/DAY PRN, Annie Esposito DCheyO., 25 mg at 01/31/25 1508    heparin intracatheter (for DIALYSIS USE ONLY) 1,800 Units, 1,800 Units, Intracatheter, DIALYSIS PRN, Ankit Diggs M.D., 1,800 Units at 01/31/25 1035    heparin intracatheter (for DIALYSIS USE ONLY) 1,800 Units, 1,800 Units, Intracatheter, DIALYSIS PRN, Ankit Diggs M.D., 1,800 Units at 01/31/25 1035    acetaminophen (Tylenol) tablet 500 mg, 500 mg, Oral, Q6HRS, RODRICK ChoudhuryOChey, 500 mg at 01/31/25 1716    acetaminophen (Tylenol) tablet 500 mg, 500 mg, Oral, Q6HRS PRN, Annie Esposito D.O.    torsemide (Demadex) tablet 100 mg, 100 mg, Oral, Q DAY, Ankit Diggs M.D., 100 mg at 01/31/25 0506    NS (Bolus) 0.9 % infusion 100 mL, 100 mL, Intravenous, DIALYSIS PRN, Ankit Diggs M.D.    methocarbamol (Robaxin) tablet 750 mg, 750 mg, Oral, TID, Tomi Rose, D.O., 750 mg at 01/31/25 1717    petrolatum 42 % ointment, , Topical, BID, Dalton Fowler M.D., Given at 01/31/25 1719    metoprolol SR (Toprol XL) tablet 50 mg, 50 mg, Oral, Q EVENING, Jigar Cabrales M.D., 50 mg at 01/31/25 1718    amLODIPine (Norvasc) tablet 10 mg, 10 mg, Oral, DAILY, Cr BAILEY Gonzalo, M.D., 10 mg at 01/30/25 0512    levothyroxine (Synthroid) tablet 250 mcg, 250 mcg, Oral, AM ES, Cr Sánchez M.D., 250 mcg at 01/31/25 0505    traZODone (Desyrel) tablet 150 mg, 150 mg, Oral, QHS, Cr Sánchez M.D., 150 mg at 01/30/25 2117    venlafaxine XR (Effexor XR) capsule 150 mg, 150 mg, Oral, DAILY, Cr Sánchez M.D., 150 mg at 01/31/25 0505    heparin injection 5,000 Units, 5,000 Units, Subcutaneous, Q8HRS, Annie Esposito D.O., 5,000 Units at 01/31/25 1408    senna-docusate (Pericolace Or Senokot S) 8.6-50 MG per  tablet 2 Tablet, 2 Tablet, Oral, Q EVENING, 2 Tablet at 01/30/25 1745 **AND** polyethylene glycol/lytes (Miralax) Packet 1 Packet, 1 Packet, Oral, QDAY PRN, Cr Sánchez M.D., 1 Packet at 01/24/25 0512    insulin lispro (HumaLOG,AdmeLOG) subcutaneous injection, 2-9 Units, Subcutaneous, 4X/DAY ACHS, 9 Units at 01/31/25 1718 **AND** POC blood glucose manual result, , , Q AC AND BEDTIME(S) **AND** NOTIFY MD and PharmD, , , Once **AND** Administer 20 grams of glucose (approximately 8 ounces of fruit juice) every 15 minutes PRN FSBG less than 70 mg/dL, , , PRN **AND** dextrose 50% (D50W) injection 25 g, 25 g, Intravenous, Q15 MIN PRN, Cr Sánchez M.D., 25 g at 01/30/25 0934    Pharmacy Consult Request ...Pain Management Review 1 Each, 1 Each, Other, PHARMACY TO DOSE, Anny Smith, MILENA.P.R.N.              Labs:        Lab Results   Component Value Date/Time     SODIUM 136 01/31/2025 02:06 AM     POTASSIUM 4.2 01/31/2025 02:06 AM     CHLORIDE 100 01/31/2025 02:06 AM     CO2 19 (L) 01/31/2025 02:06 AM     GLUCOSE 121 (H) 01/31/2025 02:06 AM     BUN 55 (H) 01/31/2025 02:06 AM     CREATININE 3.68 (H) 01/31/2025 02:06 AM     CREATININE 1.0 01/08/2009 04:31 PM            Lab Results   Component Value Date/Time     PROTHROMBTM 12.4 12/05/2024 01:22 PM     INR 0.93 12/05/2024 01:22 PM            Lab Results   Component Value Date/Time     WBC 7.2 01/31/2025 02:06 AM     RBC 3.09 (L) 01/31/2025 02:06 AM     HEMOGLOBIN 9.7 (L) 01/31/2025 02:06 AM     HEMATOCRIT 31.4 (L) 01/31/2025 02:06 AM     .6 (H) 01/31/2025 02:06 AM     MCH 31.4 01/31/2025 02:06 AM     MCHC 30.9 (L) 01/31/2025 02:06 AM     MPV 10.6 01/31/2025 02:06 AM     NEUTSPOLYS 73.20 (H) 01/22/2025 05:59 AM     LYMPHOCYTES 12.30 (L) 01/22/2025 05:59 AM     MONOCYTES 9.20 01/22/2025 05:59 AM     EOSINOPHILS 3.60 01/22/2025 05:59 AM     BASOPHILS 1.00 01/22/2025 05:59 AM     HYPOCHROMIA 1+ 01/28/2020 04:09 AM     ANISOCYTOSIS 1+ 10/26/2021 03:08 AM    Plt-151,000     Ca2+: 7.4 (25) Corrected Ca2+: 8.4  (25)  Bili-:0.4 (25)      AST/ALT: 136/101 (25)  Mg2+: 2.1 (1/3/25)    ANC: 10.40 (25)  TSH:4.230 (10/18/24)    Free T4: 1.12 (24)        B12-: 997 (10/18/24)        Vit D: 51 (10/18/24)        ECG: Qtc-460  Intervals                                Axis   Rate:       87                           P:          62   SC:         168                          QRS:        31   QRSD:       102                          T:          58   QT:         382   QTc:        460     Interpretive Statements   Sinus rhythm   Probable left atrial enlargement   Low voltage, extremity leads   Compared to ECG 01/10/2025 07:11:45   Low QRS voltage now present   Electronically Signed On 2025 12:26:36 PST by KIMBERLY WYLIE MD         ASSESSMENT:   Ms. Manuel is a 69 year old female with past history of depression, HTN, DM, ESRD who goes to dialysis three times a week. She presented to the ED on 25 for bilateral upper arm pain and chronic back pain. Patient states her medical conditions has caused her lots of anxiety and she is depressed and scared about what will happen to her. She feels alone and really misses her  who  in 2020. Patient states her mother, brother, and  all  within three weeks of each other in 2020, the winter months have always been hard on her since them. No auditory or visual hallucinations. No delusions. Her appetite is good, sleep is okay although she states she sometimes wakes up multiple times a night. Patient lives alone in a home but can cook, shower, clean her home, and dress herself. She has recently found it more difficult to clean her home by herself and usually makes sure someone is home when she showers in case she falls. She has become weaker due to the chronic pain and medical conditions. She does have a lot of support from her brother and friend Hirsch who help her clean  her home and complete her errands. She denies any thoughts about harming herself or others although she does endorse sometimes thinking of stopping dialysis but she still goes because she knows her children will be mad at her if she does not go. She denies any previous attempts of suicide. She used to see psychiatry for depression and has antidepressants prescribed which she states have been very helpful. Recommended therapy as well as medication regimen change to help with anxiety and depression. Patient understands and is agreeable to this plan.      Dx:  MDD, recurrent, moderate with anxious distress     Plan/Further Workup:   Legal status: Not applicable     Medication Managment:  -Continue Trazodone 150 mg PO nightly for insomnia   -continue Effexor 150 mg PO    daily for depression, stop cymbalta to reduce polypharmacy and drug interactions  -Start buspirone 5 mg BID for anxiety   -Continue PRN hydroxyzine 25 mg q6H as needed for anxiety    Labs Reviewed/Ordered:    Imaging Reviewed/Ordered:  Medical Tests Reviewed/Ordered:  Imaging:Review/Interpret:  Ordered Old Records/Obtain Collateral:  Old [facility] Records Summarized:  Discussed with another provider: Dr. Annie Esposito DO               Will follow  Thank you for the consult.

## 2025-02-01 NOTE — PROGRESS NOTES
Infectious Disease Progress Note    Author: Marianna Reeves M.D. Date & Time of service: 2025  6:10 AM    Chief Complaint:  vertebral osteo  Efaecium bacteremia     Interval History:  69 y.o. diabetic female originally  admitted 2025 worsening back pain and arm pain   AF GPC E faecium Plan of care reviewed with patient   AF crying pain-had dextrose infusion that arm   AF WBC 7.2 anxious and teary c/o severe, excruciating pain in left worse than right arm today    Review of Systems:  Review of Systems   Gastrointestinal:  Negative for abdominal pain, constipation, diarrhea, nausea and vomiting.   Musculoskeletal:  Positive for myalgias and neck pain.   Psychiatric/Behavioral:  The patient is not nervous/anxious.        Hemodynamics:  Temp (24hrs), Av.4 °C (97.5 °F), Min:36.2 °C (97.1 °F), Max:36.7 °C (98 °F)  Temperature: 36.2 °C (97.1 °F)  Pulse  Av.5  Min: 54  Max: 90   Blood Pressure : 121/72       Physical Exam:  Physical Exam  Cardiovascular:      Rate and Rhythm: Normal rate and regular rhythm.      Heart sounds: Normal heart sounds.   Pulmonary:      Effort: Pulmonary effort is normal.      Breath sounds: Normal breath sounds.   Abdominal:      General: Abdomen is flat. Bowel sounds are normal.      Palpations: Abdomen is soft.   Musculoskeletal:         General: Normal range of motion.   Skin:     General: Skin is warm and dry.   Neurological:      General: No focal deficit present.      Mental Status: She is oriented to person, place, and time.   Psychiatric:         Mood and Affect: Mood normal.         Behavior: Behavior normal.         Meds:    Current Facility-Administered Medications:     MD Alert...Vancomycin per Pharmacy    atorvastatin    busPIRone    lidocaine    oxyCODONE CR    hydrOXYzine HCl    pregabalin    insulin GLARGINE    oxyCODONE immediate-release **OR** oxyCODONE immediate-release **OR** HYDROmorphone    heparin    heparin    acetaminophen     acetaminophen    torsemide    NS    methocarbamol    petrolatum    metoprolol SR    amLODIPine    levothyroxine    traZODone    venlafaxine XR    heparin    senna-docusate **AND** polyethylene glycol/lytes    insulin lispro **AND** POC blood glucose manual result **AND** NOTIFY MD and PharmD **AND** Administer 20 grams of glucose (approximately 8 ounces of fruit juice) every 15 minutes PRN FSBG less than 70 mg/dL **AND** dextrose bolus    Pharmacy Consult Request    Labs:  Recent Labs     01/31/25  0206   WBC 7.2   RBC 3.09*   HEMOGLOBIN 9.7*   HEMATOCRIT 31.4*   .6*   MCH 31.4   RDW 63.0*   PLATELETCT 151*   MPV 10.6     Recent Labs     01/30/25  1237 01/31/25  0206   SODIUM 138 136   POTASSIUM 4.6 4.2   CHLORIDE 102 100   CO2 24 19*   GLUCOSE 128* 121*   BUN 55* 55*     Recent Labs     01/30/25  1237 01/31/25  0206   ALBUMIN  --  2.8*   CREATININE 3.39* 3.68*       Imaging:  MR-CERVICAL SPINE-WITH & W/O    Result Date: 1/31/2025 1/31/2025 7:57 PM HISTORY/REASON FOR EXAM:  worsening pain, osteomyelitis. Worsening arm pain. C7-T1 discitis/osteomyelitis TECHNIQUE/EXAM DESCRIPTION: MRI of the cervical spine without and with contrast. The study was performed on a [employer] 1.5 Twyla MRI scanner. T1 sagittal, T2 fast spin-echo sagittal, and gradient echo axial images were obtained of the cervical spine. Optional T2 fat-suppressed sagittal images may also be obtained. T1 post-contrast fat suppressed sagittal images were obtained of the cervical spine. Optional T1 post-contrast axial images may be obtained. 12 mL ProHance gadolinium contrast was administered intravenously. COMPARISON: MRI cervical spine 1/27/2025, CT cervical spine 1/10/2025 FINDINGS: Some of the imaging sequences are degraded by patient motion artifact. Alignment in the cervical spine shows no segmental subluxation. Again noted are postoperative changes with interbody fusion at C4-C5, C5-C6, C6-C7 with disc space inserts. There is posterior fusion  hardware at C4-C5-C6-C7 skipping the C6 level. At C7-T1, there is endplate destruction, partial vertebral body collapse of C7 and T1, diffuse marrow edema signal, and heterogeneous endplate enhancement again seen consistent with osteomyelitis and discitis. Also again seen is prevertebral soft tissue swelling/edema most prominent at C5-C6 down to T2-T3 but a thin band of prevertebral soft tissue edema extends all the way up to the skull base. There is no jennifer abscess pocket. There is no jennifer epidural abscess. There are no anomalies at the craniovertebral junction.  The cervical spinal cord is normal in caliber and signal throughout its course. There is no abnormal cord enhancement. At C2-3, no significant disc bulge or protrusion. No central stenosis. There is moderate left-sided hypertrophic facet arthropathy. The right neural foramen is intact. There is moderate left foraminal stenosis. At C3-4, there is a moderate disc/osteophyte complex favoring the left. Partial effacement of ventral subarachnoid space without jennifer central stenosis. There is marked left lateral recess stenosis and severe left foraminal stenosis due to uncinate spondylotic change. At C4-5, no central or foraminal stenosis. At C5-6, no central stenosis. Mild right and moderate left foraminal stenosis. At C6-7, no central stenosis. Probable mild bilateral foraminal stenosis with susceptibility artifact somewhat distorting the foramina. At C7-T1, there is a moderate-sized ventral extradural defect consistent with a disc-osteophyte complex. There is some elevated enhancing dura. Mild central stenosis. No jennifer epidural abscess. Moderate bilateral foraminal stenosis.     1.  Postoperative changes as described above. 2.  C7-T1 discitis and osteomyelitis. No significant epidural phlegmon or epidural abscess pocket. 3.  C7-T1 moderate disc/osteophyte change with mild central stenosis. 4.  Multilevel foraminal stenoses as outlined above. 5.  No  myelopathic cord signal.    EC-RAFIQ W/O CONT    Result Date: 2025  Transesophageal Echo Report Echocardiography Laboratory CONCLUSIONS Normal left and right ventricular systolic function. No obvious valvular vegetation seen. Linear echogenic density seen entering from superior vena cava traversing to near posterior wall of IVC. There is a heterogenous echogenic density seen attached to the posterior/inferior right atrial wall by entrance of IVC with multiple friable small linear densities seen coming off.  Does not have characteristic appearance of a thrombus, but cannot rule out thrombus vs mass vs abscess. Does not appear to be attached to the tip of the catheter although it is near the tip, and cannot rule it out.  Consider further imaging with either CT or cardiac MRI (would discuss with radiology best modality) to further define attachment and if can do tissue differentiation. Discussed the findings over the phone with Dr. Esposito. KALPANA BUTTS Exam Date:          2025                     14:01 Exam Location:      Inpatient Priority:            Routine Ordering Physician:        MIKEL MON Referring Physician:       YAA Rosario Sonographer:               Romel PENA Age:    69     Gender:    F MRN:    [id number] :    1955 BSA:    1.65   Ht (in):    66     Wt (lb):    128 Report Type:      Complete Indications:     Acute/Subacute Bacterial Endocarditis ICD Codes:       421 CPT Codes:       10570 BP:          /          HR: Technical Quality:       Fair MEASUREMENTS  (Male / Female) Normal Values * Indicates values subject to auto-interpretation LV EF:        % Medications Viscous lidocaine Limitations None Complications No immediate complications. Proc. Components The probe was inserted and manipulated by Dr. Mon. Epiq probe # 4 was used for this procedure. 2D, color Doppler, and spectral Doppler  were used as part of the evaluation as clinically  indicated. FINDINGS Left Ventricle Normal left ventricular systolic function. Right Ventricle Normal right ventricular systolic function. Right Atrium Normal right atrial enlargement.  Linear echogenic density seen entering from superior vena cava traversing to near posterior wall of IVC.  There is a heterogenous echogenic density seen attached to the posterior/inferior right atrial wall by entrance of IVC with multiple friable small linear densities seen coming off.  Does not have characteristic appearance of a thrombus, but cannot rule out thrombus vs mass vs abscess.  Does not appear to be attached to the tip of the catheter although it is near the tip, but cannot be completely ruled out.  Consider further imaging with either CT or cardiac MRI (would discuss with radiology best modality) to further define attachment and if can do tissue differentiation. Left Atrium Normal left atrial enlargement. LA Appendage No obvious thrombus seen. IA Septum Intact interatrial septum without interatrial shunt by color Dopplers. IV Septum Mitral Valve Mild posterior mitral annular calcification with mildy thickened leaflets with trace regurgitation. Aortic Valve Mildly sclerotic trileaflet aortic valve without significant stenosis or regurgitation. Tricuspid Valve Normal appearing tricuspid valve without significant stenosis and trace regurgitation. Pulmonic Valve Normal pulmonic valve. Pericardium No pericardial effusion. Aorta Normal aortic root size. Effie Vanegas MD (Electronically Signed) Final Date:     30 January 2025                 18:20    US-EXTREMITY VENOUS UPPER UNILAT LEFT    Result Date: 1/29/2025   Upper Extremity  Venous Duplex Report  Vascular Laboratory  CONCLUSIONS  No superficial or deep venous thrombosis.  KALPANA BUTTS  Exam Date:     01/29/2025 16:13  Room #:     Inpatient  Priority:     Routine  Ht (in):             Wt (lb):  Ordering Physician:        CASSIUS ELDER  Referring Physician:        [id number]  Sonographer:               Morgan MARCIAL  Study Type:                Complete Unilateral  Technical Quality:         Adequate  Age:    69    Gender:     F  MRN:    [id number]  :    1955      BSA:  Indications:     Pain in Limb  CPT Codes:       07810  ICD Codes:       729.5  History:         No prior study.  Limitations:  PROCEDURES:  Left upper extremity venous duplex imaging.  The following venous structures were evaluated: internal jugular,  subclavian, axillary, brachial, cephalic, and basilic veins.  Serial compression, color, and spectral Doppler flow evaluations were  performed.  FINDINGS:  Left upper extremity.  All veins demonstrate complete color filling and compressibility with  normal venous flow dynamics including spontaneous flow and respiratory  phasicity.  No superficial or deep venous thrombosis.  Flow was evaluated in the contralateral subclavian vein and normal venous  flow dynamics including spontaneous flow and respiratory phasic variation  were demonstrated.  Sang Oliva MD  (Electronically Signed)  Final Date:      2025                   17:09    EC-ECHOCARDIOGRAM COMPLETE W/O CONT    Result Date: 2025  Transthoracic Echo Report Echocardiography Laboratory CONCLUSIONS Compared to the prior study on 10/23/24, higly suspicious for native valve endocarditis. Normal left ventricular chamber size. Normal left ventricular wall thickness. Normal left ventricular systolic function. The left ventricular ejection fraction is visually estimated to be 70%. No regional wall motion abnormalities. Normal diastolic function. Echodensity on the posterior leaflet of mitral valve suggestive of valvular vegetation Echodensity on the septal leaflet of the tricuspid valve suggestive of valvular vegetation Mild tricuspid regurgitation with estimated RV systolic pressure of 33 mmHg. KALPANA BUTTS Exam Date:         2025                    11:30 Exam Location:      Inpatient Priority:          Routine Ordering Physician:        CASSIUS ELDER Referring Physician:       [id number] Foy Sonographer:               Abdon Stout Age:    69     Gender:    F MRN:    [id number] :    1955 BSA:    1.64   Ht (in):    66     Wt (lb):    126 Exam Type:     Complete Indications:     Endocarditis, Bacteremia ICD Codes:       421 CPT Codes:       50713 BP:   142    /   58     HR:   72 Technical Quality:       Fair MEASUREMENTS  (Male / Female) Normal Values 2D ECHO LV Diastolic Diameter PLAX        4.1 cm                4.2 - 5.9 / 3.9 - 5.3 cm LV Systolic Diameter PLAX         2.2 cm                2.1 - 4.0 cm IVS Diastolic Thickness           0.9 cm                LVPW Diastolic Thickness          0.9 cm                LVOT Diameter                     1.8 cm                LV Ejection Fraction MOD BP       69.2 %                >= 55  % LV Ejection Fraction MOD 4C       68.1 %                LV Ejection Fraction MOD 2C       69.3 %                LV Ejection Fraction 4C AL        68.4 %                LV Ejection Fraction 2C AL        70.1 %                LA Volume Index                   19.9 cm3/m2           16 - 28 cm3/m2 DOPPLER AV Peak Velocity                  1.2 m/s               AV Peak Gradient                  5.7 mmHg              AV Mean Gradient                  3 mmHg                LVOT Peak Velocity                0.9 m/s               AV Area Cont Eq vti               1.7 cm2               Mitral E Point Velocity           1.1 m/s               Mitral E to A Ratio               1.2                   MV Pressure Half Time             87 ms                 MV Area PHT                       2.5 cm2               MV Deceleration Time              297 ms                TR Peak Velocity                  208 cm/s              PV Peak Velocity                  0.74 m/s              PV Peak Gradient                  2.2 mmHg              LV E' Lateral Velocity             7.7 cm/s              Mitral E to LV E' Lateral Ratio   13.7                  LV E' Septal Velocity             7.2 cm/s              Mitral E to LV E' Septal Ratio    14.7                  * Indicates values subject to auto-interpretation LV EF:        % FINDINGS Left Ventricle Normal left ventricular chamber size. Normal left ventricular wall thickness. Normal left ventricular systolic function. The left ventricular ejection fraction is visually estimated to be 70%. No regional wall motion abnormalities. Normal diastolic function. Right Ventricle Normal right ventricular size and systolic function. Right Atrium Normal right atrial size. Normal inferior vena cava size and inspiratory collapse. Left Atrium Normal left atrial size. Left atrial volume index is 20 mL/sq m. Mitral Valve Mild mitral annular calcification. Calcification of the mitral valve leaflets. No mitral stenosis. Trace mitral regurgitation. Echodensity on the posterior leaflet suggestive of valvular vegetation. Aortic Valve Tricuspid aortic valve. The aortic valve leaflets are calcified. No aortic insufficiency. Tricuspid Valve Structurally normal tricuspid valve without significant stenosis. Mild tricuspid regurgitation. Right atrial pressure is estimated to be 3 mmHg. Right ventricular systolic pressure is estimated to be 33 mmHg. Echodensity on the septal leaflet suggestive of valvular vegetation Pulmonic Valve The pulmonic valve is not well visualized. No stenosis or regurgitation seen. Pericardium No pericardial effusion. Aorta Normal aortic root for body surface area. The ascending aorta is not well visualized. Homero Shaw DO (Electronically Signed) Final Date:     29 January 2025                 15:24    MR-CERVICAL SPINE-WITH & W/O    Result Date: 1/27/2025 1/27/2025 4:19 PM HISTORY/REASON FOR EXAM:  ARM PAIN; she is a dialysis patient and will get dialysis after the scan. TECHNIQUE/EXAM DESCRIPTION: MRI of the cervical spine without  and with contrast. The study was performed on a [employer] Signa 1.5 Twyla MRI scanner. T1 sagittal, T2 fast spin-echo sagittal, T1 postcontrast fat-suppressed sagittal, and gradient-echo axial images were obtained of the cervical spine. 12 mL ProHance contrast were administered  intravenously. COMPARISON:  CT dated 1/10/2025 FINDINGS:  Postoperative changes are noted with cervical fusion across C4-C7. Minimal prevertebral soft tissue edema is noted. Increased signal is noted on STIR imaging within the adjacent endplates at the C6-C7 level. Motion artifact limits evaluation. Spinal cord signal is grossly within normal limits though subtle abnormality cannot be identified due to motion artifact. Included portion of the posterior fossa is normal. Findings specific to each level are described below: C2-3: Endplate disc osteophyte complex is present causing mild canal narrowing. There is probably mild left foraminal narrowing. C3-4:  Postoperative changes noted with a well decompressed spinal canal. Neuroforamina are normal. C4-5:  Spinal canal is well decompressed. There is no significant canal narrowing. C5-6: Spinal canal is well decompressed. C6-7: Endplate discussed by complex at C6-C7 results in moderate canal narrowing with slight cord deformity. There is also moderate bilateral foraminal narrowing at this level. C7-T1: No significant abnormality. Mild enhancement of the intervertebral disc space at C6-C7 and minimal enhancement anteriorly within the prevertebral soft tissues. Linear enhancement also noted extending inferiorly along the right paracentral aspect in the prevertebral region at the C7-T1 level. No definite abnormal epidural enhancement is seen.     1.  Postoperative changes in the cervical spine as described above. 2.  Abnormal signal is noted in the prevertebral soft tissues extending from the mid C2 to the C7-T1 level. Abnormal marrow signal is noted at the C7-T1 level with mild enhancement of the  intervertebral disc space at this level. These findings are concerning for discitis-osteomyelitis. 3.  There is moderate spinal canal stenosis at C6-7.     US-RUQ    Result Date: 1/22/2025 1/22/2025 11:02 AM HISTORY/REASON FOR EXAM: Right upper quadrant abdominal pain. TECHNIQUE/EXAM DESCRIPTION: Ultrasound of the gallbladder, liver and biliary tree. COMPARISON: None FINDINGS: The liver echogenicity is heterogeneous. There is no evidence of hepatic mass. The gallbladder is surgically absent. The common bile duct is measured at 12 mm. There is some intrahepatic biliary ductal dilatation. The visualized portions of the portal vein and inferior vena cava are normal. The pancreas is normal. The right kidney is normal.     1.  Prior cholecystectomy. 2.  Common bile duct diameter measured at 12 mm with some intrahepatic biliary ductal dilatation. This may be related to presence of prior cholecystectomy. 3.  The liver is heterogeneous consistent with fatty change versus hepatocellular dysfunction.    DX-CHEST-PORTABLE (1 VIEW)    Result Date: 1/22/2025 1/22/2025 5:43 AM HISTORY/REASON FOR EXAM: Chest Pain TECHNIQUE/EXAM DESCRIPTION:  Single AP view of the chest. COMPARISON: January 6, 2025 FINDINGS: Position of medical devices appears stable. The cardiac silhouette appears within normal limits. Atherosclerotic calcification of the aorta is noted.  The central pulmonary vasculature appears normal. Asymmetric elevation of the right hemidiaphragm is noted.  Bilateral lungs are clear. No significant pleural effusions are identified. The bony structures appear age-appropriate.     1.  No acute cardiopulmonary disease. 2.  Atherosclerosis    CT-TSPINE W/O PLUS RECONS    Result Date: 1/10/2025  1/10/2025 9:54 AM HISTORY/REASON FOR EXAM:  SYNCOPE; BACK PAIN; ARM PAIN. TECHNIQUE/EXAM DESCRIPTION AND NUMBER OF VIEWS: CT thoracic spine without contrast, with reconstructions. The study was performed on a [employer] CT scanner.  Thin-section helical scanning was performed from C7-T1 through T12-L1. Sagittal and coronal multiplanar reconstructions were generated from the axial images. Low dose optimization technique was utilized for this CT exam including automated exposure control and adjustment of the mA and/or kV according to patient size. COMPARISON: None. FINDINGS: Nomenclature: C7-T1 is axial image 26 of 254. Alignment: Moderate leftward curvature centered at T9-10. Minimal anterolisthesis of C7 on T1. Vertebrae: No acute fracture. No aggressive osseous lesions. Degenerative loss of intervertebral disc space at several levels throughout the mid thoracic spine. Endplate sclerosis at T9-10 and is unchanged. Spondylosis: No high-grade canal or foraminal stenosis. Soft tissues: Trace bilateral pleural effusions.     No acute fracture or traumatic malalignment.    CT-CSPINE WITHOUT PLUS RECONS    Result Date: 1/10/2025  1/10/2025 9:54 AM HISTORY/REASON FOR EXAM: SYNCOPE; BACK PAIN; ARM PAIN TECHNIQUE/EXAM DESCRIPTION: CT cervical spine without contrast, with reconstructions. The study was performed on a helical multidetector CT scanner. Thin-section helical scanning was performed from the skull base through T1. Sagittal and coronal multiplanar reconstructions were generated from the axial images. Low dose optimization technique was utilized for this CT exam including automated exposure control and adjustment of the mA and/or kV according to patient size. COMPARISON:  CT 3/10/2019, MRI 1/8/2025. FINDINGS: Alignment: Grade 1 anterolisthesis of C2 on C3 and C7 on T1. Minimal retrolisthesis of C3 on C4. C1 and C2 lateral masses are congruent. Vertebrae: Prior interbody and posterior fusion of C4-C7. Hardware creates streak artifact limiting evaluation of vertebral bodies and posterior elements at the surgical levels. No evidence of lucency surrounding the hardware. There is sclerosis of the C7-T1 endplates as before, with increased destruction  of the intervening disc space. No acute fracture. No aggressive osseous lesion. Spondylosis: No high-grade osseous canal or foraminal stenosis. Soft tissues: No prevertebral soft tissue swelling. Visualized lung apices are clear. Other: Visualized intracranial contents are unremarkable.     Prior interbody and posterior fusion of C4-C7. Sclerosis of the C7-T1 endplates as before, with increased destruction of the intervening disc space since 2019. Please correlate clinically for discitis-osteomyelitis.    CT-HEAD W/O    Result Date: 1/10/2025  1/10/2025 9:54 AM HISTORY/REASON FOR EXAM: R42  Dizziness TECHNIQUE/EXAM DESCRIPTION AND NUMBER OF VIEWS: CT of the head without contrast. The study was performed on a helical multidetector CT scanner. Contiguous 2.5 mm axial sections were obtained from the skull base through the vertex. Up to date radiation dose reduction adjustments have been utilized to meet ALARA standards for radiation dose reduction. COMPARISON:  4/1/2024 FINDINGS: There is no evidence of acute intracranial hemorrhage, mass, mass-effect or shift of midline structures. Gray-white matter differentiation is grossly preserved. Ventricle size and brain parenchymal volume are appropriate for this patient's stated age. The basal cisterns are patent. There are no abnormal extra-axial fluid collections. No depressed or widely  calvarial fracture is seen. The visualized paranasal sinuses and temporal bone structures are aerated. ___________________________________     1. No acute intracranial abnormality. No evidence of acute intracranial hemorrhage or mass lesion.     MR-THORACIC SPINE-WITH & W/O    Result Date: 1/8/2025 1/8/2025 8:02 AM HISTORY/REASON FOR EXAM:  BACK PAIN BACK PAIN - Pt states for a long time she has had pain between her scapula, has been seen for it. States pain is back and worse tonight. TECHNIQUE/EXAM DESCRIPTION: MRI of the thoracic spine without and with contrast. The study was  performed on a [employer] Signa 1.5 Twyla MRI scanner. T1 sagittal, T2 fast spin-echo sagittal, and T2 axial images were obtained of the thoracic spine. T1 post-contrast fat suppressed sagittal images were obtained. Optional T1 post-contrast axial images may be obtained. 10 mL ProHance contrast was administered intravenously. COMPARISON:  CT thoracic spine 12/22/2024 FINDINGS: Compensatory levocurvature of the thoracic spine noted with a rightward curvature of the lumbar spine. Spinal cord signal intensity is within normal limits. The conus is identified at the T12-L1 level. Type II marrow changes are noted in the adjacent T9 and T10 endplates. At T9-10, endplate degenerative changes noted with a posterior disc osteophyte complex mildly encroaching upon the spinal canal. There is also bilateral advanced facet degeneration at this level. These degenerative changes result in mild canal narrowing at this level. The remaining thoracic levels do not demonstrate any evidence of significant canal or foraminal stenosis. Postcontrast images through the thoracic spine do not demonstrate any abnormal enhancement.     Mild degenerative changes at T9-10. Otherwise, no significant abnormality is identified in the thoracic spine.    MR-CERVICAL SPINE-WITH & W/O    Result Date: 1/8/2025 1/8/2025 8:02 AM HISTORY/REASON FOR EXAM:  BACK PAIN TECHNIQUE/EXAM DESCRIPTION: MRI of the cervical spine without and with contrast. The study was performed on a [employer] Signa 1.5 Twyla MRI scanner. T1 sagittal, T2 fast spin-echo sagittal, T1 postcontrast fat-suppressed sagittal, and gradient-echo axial images were obtained of the cervical spine. 10 mL ProHance contrast were administered  intravenously. COMPARISON:  3/10/2019, MRI dated 9/1/2016. FINDINGS:  C4-C7 posterior lateral mass fusion noted. Abnormal marrow signal is noted involving the adjacent endplates at C6-C7 with increased signal on STIR imaging and low signal on T1 imaging, more marked in  C7. Motion artifact significantly limits evaluation. Included portion of the posterior fossa is normal. Spinal cord signal is grossly normal. Findings specific to each level are described below: . C2-3: Normal C3-4:  Endplate disc osteophyte complex with bilateral uncovertebral degeneration. There is asymmetric mild left-sided canal narrowing due to left predominant uncovertebral degeneration. There is moderate left foraminal narrowing. C4-5: Endplate disc osteophyte complex with bilateral uncovertebral degeneration but no significant canal or foraminal stenosis. C5-6: Postoperative changes noted at this level. There is no significant canal stenosis. Neural foramina are difficult to assess due to artifact from the lateral mass screws. C6-7: Endplate disc osteophyte complex moderately encroaches upon the spinal canal causing moderate canal narrowing. There is bilateral uncovertebral degeneration with moderate right foraminal narrowing. C7-T1: Minimal endplate disc osteophyte complex without significant encroachment upon the spinal canal or neural foramina. T1-2: Normal. No definite abnormal epidural enhancement is identified in cervical spine. There is mild enhancement of the bone marrow of the adjacent endplates at C6-C7 T2-weighted images demonstrate minimal high signal in the prevertebral space at the C6-C7 level. However, there is no definite associated abnormal enhancement in this location. Prevertebral soft tissues are within normal limits.     1.  Postoperative changes noted in the cervical spine with posterior lateral mass fusion between C4 and C7. 2.  Abnormal bone marrow signal abnormal enhancement identified at the C6-C7 level involving the adjacent endplates and the entirety of the C7 vertebral body. Slightly increased T2 signal is also noted within the disc space at this level with minimal enhancement. However, this area did demonstrate a sclerotic appearance on previous CT. Minimal abnormal signal is  present also within the prevertebral soft tissues at this level. A new infectious process cannot be completely excluded. Recommend correlation with history, physical exam and consider short-term follow-up imaging. 3.  Moderate canal stenosis is noted at C6-C7.    DX-CHEST-PORTABLE (1 VIEW)    Result Date: 1/6/2025 1/6/2025 12:55 PM HISTORY/REASON FOR EXAM:  Chest Pain. TECHNIQUE/EXAM DESCRIPTION AND NUMBER OF VIEWS: Single portable view of the chest. COMPARISON: 12/31/2024 FINDINGS: Stable right IJ central venous catheter. Cardiomediastinal silhouette is stable. Aortic calcified atherosclerotic plaque. Coronary artery stent. No focal consolidation, pleural effusion, pulmonary edema or pneumothorax. No acute osseous abnormality.     No acute cardiopulmonary abnormality.      Micro:  Results       Procedure Component Value Units Date/Time    BLOOD CULTURE [[id number]]  (Abnormal)  (Susceptibility) Collected: 01/27/25 1859    Order Status: Completed Specimen: Blood from Peripheral Updated: 01/31/25 0730     Significant Indicator POS     Source BLD     Site PERIPHERAL     Culture Result Growth detected by automated blood culture system.  01/28/2025  12:13        Enterococcus faecium  If daptomycin is used for E. faecium treatment, high-dose  regimen is recommended.  The susceptibility profile for this organism indicates that  Streptomycin would not be an effective component of  combination therapy.        Staphylococcus epidermidis  POSSIBLE CONTAMINANT: Isolated from one set only, please  correlate with clinical condition. Contact the Microbiology  department within 48 hr if susceptibility testing is needed.      Susceptibility       Enterococcus faecium (2)       Antibiotic Interpretation Microscan   Method Status    Ampicillin Resistant >8 mcg/mL PAULINE Final    Gent Synergy Sensitive <=500 mcg/mL PAULINE Final    Vancomycin Sensitive 0.5 mcg/mL PAULINE Final                       BLOOD CULTURE [[id number]] Collected: 01/29/25  1828    Order Status: Completed Specimen: Blood from Peripheral Updated: 01/29/25 2008     Significant Indicator NEG     Source BLD     Site PERIPHERAL     Culture Result No Growth  Note: Blood cultures are incubated for 5 days and  are monitored continuously.Positive blood cultures  are called to the RN and reported as soon as  they are identified.      BLOOD CULTURE [[id number]] Collected: 01/29/25 1828    Order Status: Completed Specimen: Blood from Peripheral Updated: 01/29/25 2008     Significant Indicator NEG     Source BLD     Site PERIPHERAL     Culture Result No Growth  Note: Blood cultures are incubated for 5 days and  are monitored continuously.Positive blood cultures  are called to the RN and reported as soon as  they are identified.      BLOOD CULTURE [[id number]] Collected: 01/28/25 0241    Order Status: Completed Specimen: Blood from Peripheral Updated: 01/28/25 0608     Significant Indicator NEG     Source BLD     Site PERIPHERAL     Culture Result No Growth  Note: Blood cultures are incubated for 5 days and  are monitored continuously.Positive blood cultures  are called to the RN and reported as soon as  they are identified.              Assessment:  Active Hospital Problems    Diagnosis     *Radiculopathy affecting upper extremity [M54.10]     Right atrial mass [I51.89]     Bacteremia [R78.81]     Acute osteomyelitis of cervical spine ([facility]) [M46.22]     CKD (chronic kidney disease) stage 4, GFR 15-29 ml/min ([facility]) [N18.4]     DNR (do not resuscitate) [Z66]     Type 2 diabetes mellitus, with long-term current use of insulin ([facility]) [E11.9, Z79.4]     Intractable back pain [M54.9]     ESRD needing dialysis ([facility]) [N18.6, Z99.2]     CAD S/P percutaneous coronary angioplasty [I25.10, Z98.61]     Primary hypertension [I10]      Interval 24 hours:      AF, O2 RA  Labs reviewed  [facility] Imaging personally reviewed both images and report.  Studies reviewed  Micro reviewed  Notes from primary team and specialists  reviewed    Pt with improvement in her neck pain today.  She is reporting arm pain.    Antibiotics as below.       Assessment:  Vertebral osteomyelitis, presence of hardware, we will presume the infection is due to E faecium is 2 of 4 bottles positive.  However, patient with dialysis line and possibly infected thrombus and with hardware in her spine so there is also a risk that the Staph epidermidis is the infectious agent   -IR and Neurosurgery declined biopsy, positive blood cx  - Prior fusion of C4-C7 w/ inserts, posterior fusion hardware C4-C7  -Repeat MRI C-spine with C7-T1 discitis and osteomyelitis. No significant epidural phlegmon or epidural abscess pocket.   Bacteremia   -Blood cult on 1/27 2:4 bottles +E faecium &  1:4 bottles +staph epi   -Blood cult on 1/28 & 1/29 - NGTD   Possible endocarditis, thrombus, concern for infected clot associated with her catheter line  -TTE with vegetations TV and MV  -RAFIQ equivocal   Sclerotic valves with mitral annular calcifications, but no obvious valvular vegetation.   There is a irregular echogenic density adherent to the right atrial posterior wall by IVC entry with mobile linear density off the density that can be either infections or thrombus.  Does not appear attached to the catheter tip, but cannot completely rule out.   ESRD, on dialysis 2 times weekly     PLAN:   --- Continue vancomycin with pharmacy to dose to treat the E faecium as well as the CoNS bacteremia, VOM and potentially infected thrombus  --- Recommend removal of the HD line, line holiday and then replacement once blood cultures are confirmed is clear, once line is removed we will repeat the blood culture  --- Follow-up blood cultures from 1/29, no growth to date t   --- Anticipate 6+ weeks IV abx from date of negative blood cxs -potentially continue vancomycin with dialysis will need to discuss with nephrology whether this is feasible with twice a week dialysis or whether she will need a 3 times a  week schedule.  Anticipate she will need 3 times a week.      PICC -TBD     Plan of care discussed with Dr Esposito.  Will continue to follow

## 2025-02-01 NOTE — PROGRESS NOTES
Patient report received and assumed care. Assessed patient at 0800. Patient is Aox4 and reports 7/10 pain left arm. Patient is low fall risk and is SBA to the bathroom. Patient necrosis on the right hell and left big toe. Patient on room air. Patient bed is in low, locked position with bed alarm on. Standard fall precautions are in place. Personal belonging and call light are within reach. Patient reinforced to use call light when needed.

## 2025-02-02 LAB
ALBUMIN SERPL BCP-MCNC: 3.2 G/DL (ref 3.2–4.9)
BACTERIA BLD CULT: NORMAL
BUN SERPL-MCNC: 18 MG/DL (ref 8–22)
CALCIUM ALBUM COR SERPL-MCNC: 8.8 MG/DL (ref 8.5–10.5)
CALCIUM SERPL-MCNC: 8.2 MG/DL (ref 8.5–10.5)
CHLORIDE SERPL-SCNC: 98 MMOL/L (ref 96–112)
CO2 SERPL-SCNC: 27 MMOL/L (ref 20–33)
CREAT SERPL-MCNC: 2.05 MG/DL (ref 0.5–1.4)
ERYTHROCYTE [DISTWIDTH] IN BLOOD BY AUTOMATED COUNT: 58.7 FL (ref 35.9–50)
GFR SERPLBLD CREATININE-BSD FMLA CKD-EPI: 26 ML/MIN/1.73 M 2
GLUCOSE BLD STRIP.AUTO-MCNC: 175 MG/DL (ref 65–99)
GLUCOSE BLD STRIP.AUTO-MCNC: 198 MG/DL (ref 65–99)
GLUCOSE BLD STRIP.AUTO-MCNC: 426 MG/DL (ref 65–99)
GLUCOSE BLD STRIP.AUTO-MCNC: 96 MG/DL (ref 65–99)
GLUCOSE SERPL-MCNC: 173 MG/DL (ref 65–99)
HCT VFR BLD AUTO: 31.2 % (ref 37–47)
HGB BLD-MCNC: 10.1 G/DL (ref 12–16)
MCH RBC QN AUTO: 31.5 PG (ref 27–33)
MCHC RBC AUTO-ENTMCNC: 32.4 G/DL (ref 32.2–35.5)
MCV RBC AUTO: 97.2 FL (ref 81.4–97.8)
PHOSPHATE SERPL-MCNC: 2.8 MG/DL (ref 2.5–4.5)
PLATELET # BLD AUTO: 167 K/UL (ref 164–446)
PMV BLD AUTO: 9.8 FL (ref 9–12.9)
POTASSIUM SERPL-SCNC: 4.4 MMOL/L (ref 3.6–5.5)
RBC # BLD AUTO: 3.21 M/UL (ref 4.2–5.4)
SIGNIFICANT IND 70042: NORMAL
SITE SITE: NORMAL
SODIUM SERPL-SCNC: 136 MMOL/L (ref 135–145)
SOURCE SOURCE: NORMAL
WBC # BLD AUTO: 8.6 K/UL (ref 4.8–10.8)

## 2025-02-02 PROCEDURE — 700101 HCHG RX REV CODE 250: Performed by: INTERNAL MEDICINE

## 2025-02-02 PROCEDURE — A9270 NON-COVERED ITEM OR SERVICE: HCPCS | Performed by: NEUROLOGICAL SURGERY

## 2025-02-02 PROCEDURE — 700102 HCHG RX REV CODE 250 W/ 637 OVERRIDE(OP): Performed by: HOSPITALIST

## 2025-02-02 PROCEDURE — 770006 HCHG ROOM/CARE - MED/SURG/GYN SEMI*

## 2025-02-02 PROCEDURE — A9270 NON-COVERED ITEM OR SERVICE: HCPCS | Performed by: HOSPITALIST

## 2025-02-02 PROCEDURE — 85027 COMPLETE CBC AUTOMATED: CPT

## 2025-02-02 PROCEDURE — A9270 NON-COVERED ITEM OR SERVICE: HCPCS | Performed by: INTERNAL MEDICINE

## 2025-02-02 PROCEDURE — A9270 NON-COVERED ITEM OR SERVICE: HCPCS | Performed by: STUDENT IN AN ORGANIZED HEALTH CARE EDUCATION/TRAINING PROGRAM

## 2025-02-02 PROCEDURE — 700102 HCHG RX REV CODE 250 W/ 637 OVERRIDE(OP): Performed by: STUDENT IN AN ORGANIZED HEALTH CARE EDUCATION/TRAINING PROGRAM

## 2025-02-02 PROCEDURE — 700102 HCHG RX REV CODE 250 W/ 637 OVERRIDE(OP): Performed by: INTERNAL MEDICINE

## 2025-02-02 PROCEDURE — 36415 COLL VENOUS BLD VENIPUNCTURE: CPT

## 2025-02-02 PROCEDURE — 80069 RENAL FUNCTION PANEL: CPT

## 2025-02-02 PROCEDURE — 90832 PSYTX W PT 30 MINUTES: CPT | Performed by: SOCIAL WORKER

## 2025-02-02 PROCEDURE — 700102 HCHG RX REV CODE 250 W/ 637 OVERRIDE(OP): Performed by: NEUROLOGICAL SURGERY

## 2025-02-02 PROCEDURE — 700111 HCHG RX REV CODE 636 W/ 250 OVERRIDE (IP): Performed by: INTERNAL MEDICINE

## 2025-02-02 PROCEDURE — 99233 SBSQ HOSP IP/OBS HIGH 50: CPT | Performed by: INTERNAL MEDICINE

## 2025-02-02 PROCEDURE — 82962 GLUCOSE BLOOD TEST: CPT | Mod: 91

## 2025-02-02 RX ADMIN — LIDOCAINE 3 PATCH: 4 PATCH TOPICAL at 14:07

## 2025-02-02 RX ADMIN — OXYCODONE HYDROCHLORIDE 10 MG: 10 TABLET, FILM COATED, EXTENDED RELEASE ORAL at 18:15

## 2025-02-02 RX ADMIN — ACETAMINOPHEN 500 MG: 500 TABLET ORAL at 11:44

## 2025-02-02 RX ADMIN — OXYCODONE HYDROCHLORIDE 10 MG: 10 TABLET ORAL at 12:57

## 2025-02-02 RX ADMIN — METHOCARBAMOL 750 MG: 750 TABLET ORAL at 11:43

## 2025-02-02 RX ADMIN — OXYCODONE HYDROCHLORIDE 10 MG: 10 TABLET ORAL at 02:00

## 2025-02-02 RX ADMIN — LEVOTHYROXINE SODIUM 250 MCG: 0.12 TABLET ORAL at 06:52

## 2025-02-02 RX ADMIN — PETROLATUM: 420 OINTMENT TOPICAL at 17:02

## 2025-02-02 RX ADMIN — PETROLATUM: 420 OINTMENT TOPICAL at 06:53

## 2025-02-02 RX ADMIN — ACETAMINOPHEN 500 MG: 500 TABLET ORAL at 06:51

## 2025-02-02 RX ADMIN — INSULIN LISPRO 9 UNITS: 100 INJECTION, SOLUTION INTRAVENOUS; SUBCUTANEOUS at 17:16

## 2025-02-02 RX ADMIN — AMLODIPINE BESYLATE 10 MG: 10 TABLET ORAL at 06:53

## 2025-02-02 RX ADMIN — TRAZODONE HYDROCHLORIDE 150 MG: 50 TABLET ORAL at 20:21

## 2025-02-02 RX ADMIN — VENLAFAXINE HYDROCHLORIDE 150 MG: 75 CAPSULE, EXTENDED RELEASE ORAL at 06:52

## 2025-02-02 RX ADMIN — INSULIN LISPRO 2 UNITS: 100 INJECTION, SOLUTION INTRAVENOUS; SUBCUTANEOUS at 23:00

## 2025-02-02 RX ADMIN — OXYCODONE HYDROCHLORIDE 15 MG: 15 TABLET ORAL at 16:10

## 2025-02-02 RX ADMIN — HEPARIN SODIUM 5000 UNITS: 5000 INJECTION, SOLUTION INTRAVENOUS; SUBCUTANEOUS at 06:53

## 2025-02-02 RX ADMIN — INSULIN LISPRO 2 UNITS: 100 INJECTION, SOLUTION INTRAVENOUS; SUBCUTANEOUS at 11:47

## 2025-02-02 RX ADMIN — OXYCODONE HYDROCHLORIDE 10 MG: 10 TABLET, FILM COATED, EXTENDED RELEASE ORAL at 10:03

## 2025-02-02 RX ADMIN — SENNOSIDES AND DOCUSATE SODIUM 2 TABLET: 50; 8.6 TABLET ORAL at 17:00

## 2025-02-02 RX ADMIN — METHOCARBAMOL 750 MG: 750 TABLET ORAL at 06:53

## 2025-02-02 RX ADMIN — HEPARIN SODIUM 5000 UNITS: 5000 INJECTION, SOLUTION INTRAVENOUS; SUBCUTANEOUS at 20:21

## 2025-02-02 RX ADMIN — TORSEMIDE 100 MG: 100 TABLET ORAL at 06:53

## 2025-02-02 RX ADMIN — PREGABALIN 25 MG: 25 CAPSULE ORAL at 16:59

## 2025-02-02 RX ADMIN — INSULIN GLARGINE-YFGN 8 UNITS: 100 INJECTION, SOLUTION SUBCUTANEOUS at 17:15

## 2025-02-02 RX ADMIN — BUSPIRONE HYDROCHLORIDE 5 MG: 10 TABLET ORAL at 16:58

## 2025-02-02 RX ADMIN — METHOCARBAMOL 750 MG: 750 TABLET ORAL at 17:02

## 2025-02-02 RX ADMIN — ATORVASTATIN CALCIUM 40 MG: 40 TABLET, FILM COATED ORAL at 17:00

## 2025-02-02 RX ADMIN — ACETAMINOPHEN 500 MG: 500 TABLET ORAL at 17:01

## 2025-02-02 RX ADMIN — HYDROXYZINE HYDROCHLORIDE 25 MG: 50 TABLET ORAL at 20:21

## 2025-02-02 RX ADMIN — BUSPIRONE HYDROCHLORIDE 5 MG: 10 TABLET ORAL at 06:52

## 2025-02-02 RX ADMIN — HEPARIN SODIUM 5000 UNITS: 5000 INJECTION, SOLUTION INTRAVENOUS; SUBCUTANEOUS at 14:06

## 2025-02-02 RX ADMIN — METOPROLOL SUCCINATE 50 MG: 50 TABLET, EXTENDED RELEASE ORAL at 16:59

## 2025-02-02 ASSESSMENT — PAIN DESCRIPTION - PAIN TYPE
TYPE: ACUTE PAIN
TYPE: ACUTE PAIN
TYPE: ACUTE PAIN;CHRONIC PAIN
TYPE: ACUTE PAIN

## 2025-02-02 ASSESSMENT — ENCOUNTER SYMPTOMS
ABDOMINAL PAIN: 0
WEAKNESS: 1
FEVER: 0
SHORTNESS OF BREATH: 0
NECK PAIN: 1
NERVOUS/ANXIOUS: 1
NAUSEA: 0
CHILLS: 0
BACK PAIN: 1

## 2025-02-02 ASSESSMENT — PATIENT HEALTH QUESTIONNAIRE - PHQ9
SUM OF ALL RESPONSES TO PHQ QUESTIONS 1-9: 5
CLINICAL INTERPRETATION OF PHQ2 SCORE: 2
5. POOR APPETITE OR OVEREATING: 0 - NOT AT ALL

## 2025-02-02 ASSESSMENT — ANXIETY QUESTIONNAIRES
6. BECOMING EASILY ANNOYED OR IRRITABLE: MORE THAN HALF THE DAYS
1. FEELING NERVOUS, ANXIOUS, OR ON EDGE: MORE THAN HALF THE DAYS
3. WORRYING TOO MUCH ABOUT DIFFERENT THINGS: MORE THAN HALF THE DAYS
IF YOU CHECKED OFF ANY PROBLEMS ON THIS QUESTIONNAIRE, HOW DIFFICULT HAVE THESE PROBLEMS MADE IT FOR YOU TO DO YOUR WORK, TAKE CARE OF THINGS AT HOME, OR GET ALONG WITH OTHER PEOPLE: VERY DIFFICULT
7. FEELING AFRAID AS IF SOMETHING AWFUL MIGHT HAPPEN: MORE THAN HALF THE DAYS
5. BEING SO RESTLESS THAT IT IS HARD TO SIT STILL: MORE THAN HALF THE DAYS
2. NOT BEING ABLE TO STOP OR CONTROL WORRYING: MORE THAN HALF THE DAYS
GAD7 TOTAL SCORE: 14
4. TROUBLE RELAXING: MORE THAN HALF THE DAYS

## 2025-02-02 NOTE — CARE PLAN
Problem: Pain - Standard  Goal: Alleviation of pain or a reduction in pain to the patient’s comfort goal  Description: Target End Date:  Prior to discharge or change in level of care    Document on Vitals flowsheet    1.  Document pain using the appropriate pain scale per order or unit policy  2.  Educate and implement non-pharmacologic comfort measures (i.e. relaxation, distraction, massage, cold/heat therapy, etc.)  3.  Pain management medications as ordered  4.  Reassess pain after pain med administration per policy  5.  If opiods administered assess patient's response to pain medication is appropriate per POSS sedation scale  6.  Follow pain management plan developed in collaboration with patient and interdisciplinary team (including palliative care or pain specialists if applicable)  Outcome: Progressing   The patient is Stable - Low risk of patient condition declining or worsening    Shift Goals  Clinical Goals: pain will be 3/10 throughout shift  Patient Goals: comfort, safety  Family Goals: RILEY    Progress made toward(s) clinical / shift goals:  pain managed with medication and movement    Patient is not progressing towards the following goals:

## 2025-02-02 NOTE — PROGRESS NOTES
Hospital Medicine Daily Progress Note    Date of Service  2/2/2025    Chief Complaint  Anu Manuel is a 69 y.o. female admitted 1/22/2025 with neck pain     Hospital Course  Anu Manuel is a 69 y.o. female with past medical history of insulin-dependent diabetes mellitus, ESRD on dialysis, CAD, recent admission for acute on chronic back pain who presented 1/22/2025 with bilateral arm pain.  MRI of the cervical spine from 1/8/2025 revealed abnormal bone marrow signal with raising the possibility of infection and short-term follow-up imaging was recommended. Nephrology consulted for scheduled dialysis.  MRI cervical spine showed C7-T1 findings concerning for discitis/osteomyelitis with moderate spinal canal stenosis at C6-7.  Neurosurgery was consulted recommended IR bone biopsy and conservative management.  Did not recommend surgery at this time.  Discussed with IR however they do not perform cervical bone biopsies.  Surgery then recommended ID consults and conservative treatment of antibiotics.  Patient was positive for Enterococcus faecium bacteremia and infectious disease was consulted started on IV antibiotics.  Initial echocardiogram was concerning for endocarditis and cardiology was consulted for RAFIQ which was performed 1/30 showed heterogenous echogenic density seen attached to the posterior/inferior right atrial wall by entrance of IVC with multiple friable small linear densities seen coming off.  Due to severe ongoing pain repeat MRI was done that was stable as well as venous ultrasound of her upper extremity which was negative.  HD catheter was removed 2/1 and patient will need a line holiday for 72 hours.     During hospitalization patient with severe anxiety.  Psychiatry was consulted and recommended to start BuSpar 5 mg twice daily with Atarax as needed and psychotherapy.    Interval Problem Update  1/31 vital stable  WBC 7.2, hemoglobin 9.7  Repeat blood cultures 1/29 negative to  date  Patient went for dialysis this morning  Discussed with infectious disease since patient has ongoing back and arm pain recommended repeat MRI C-spine which has been ordered  Discussed with psychiatry recommended to discontinue Cymbalta and start BuSpar 5 mg twice daily with the Atarax as needed.  They have also ordered psychotherapy for her  Patient again hysterical at time of my evaluation.  Now reports that pain in both her arms is back in addition to between her shoulder blades. She is frequently using oxy 15 and dilaudid. I ordered lidocaine patches for her back and shoulders.  Start oxycodone CR twice daily to help with pain control    2/1 hypertensive this morning   Labs pending   MRI cervical spine continues to show discitis and osteomyelitis no significant epidural phlegmon or abscess, with high-level foraminal stenosis  Discussed with nephrology since patient got MRI with contrast yesterday we will plan for dialysis today  Discussed with infectious disease who recommended to have dialysis catheter removed and due to line holiday for 72 hours with repeat blood cultures  Repeat blood cultures 1/29 negative to date  Discussed with interventional radiology who will remove dialysis catheter this afternoon  Patient's son at bedside.  Patient reports that her pain is much better today after starting the long-acting pain medications.  She continues to have pain in her mid back into her shoulders.  Discussed results of MRI and plan of care moving forward, all questions answered.    2/2 vital stable  WBC 8.6, hemoglobin 10.1  Permacatheter removed by IR yesterday  Repeat blood cultures negative to date  Discussed with psychiatry who evaluated patient today and recommending continued psychotherapy support for her anxiety  Patient continues to complain of pain in her back however she reports it is better overall.  Discussed that she needs to start getting up and walking around in anticipation for home hopefully  sometime this week    I have discussed this patient's plan of care and discharge plan at IDT rounds today with Case Management, Nursing, Nursing leadership, and other members of the IDT team.    Consultants/Specialty  nephrology  Infectious disease  Cardiology  Psychiatry    Code Status  DNAR/DNI    Disposition  The patient is not medically cleared for discharge to home or a post-acute facility.  Anticipate discharge to: home with close outpatient follow-up    I have placed the appropriate orders for post-discharge needs.    Review of Systems  Review of Systems   Constitutional:  Positive for malaise/fatigue. Negative for chills and fever.   Respiratory:  Negative for shortness of breath.    Cardiovascular:  Negative for chest pain and leg swelling.   Gastrointestinal:  Negative for abdominal pain and nausea.   Musculoskeletal:  Positive for back pain, joint pain and neck pain.   Neurological:  Positive for weakness.   Psychiatric/Behavioral:  The patient is nervous/anxious.         Physical Exam  Temp:  [36.1 °C (97 °F)-36.4 °C (97.5 °F)] 36.3 °C (97.3 °F)  Pulse:  [63-78] 67  Resp:  [17-18] 17  BP: (124-160)/(54-79) 131/64  SpO2:  [93 %-96 %] 93 %    Physical Exam  Vitals and nursing note reviewed.   Constitutional:       General: She is not in acute distress.     Appearance: She is ill-appearing.   HENT:      Mouth/Throat:      Pharynx: Oropharynx is clear.   Eyes:      Conjunctiva/sclera: Conjunctivae normal.   Cardiovascular:      Rate and Rhythm: Normal rate and regular rhythm.   Pulmonary:      Effort: Pulmonary effort is normal. No respiratory distress.      Breath sounds: No wheezing.   Abdominal:      General: There is no distension.      Palpations: Abdomen is soft.      Tenderness: There is no abdominal tenderness.   Musculoskeletal:         General: Tenderness present.      Right lower leg: No edema.      Left lower leg: No edema.   Skin:     General: Skin is warm.   Neurological:      General: No focal  deficit present.      Mental Status: She is alert and oriented to person, place, and time.      Comments: Moving all extremities spontaneously   Psychiatric:         Mood and Affect: Mood normal.         Behavior: Behavior normal. Behavior is cooperative.         Fluids    Intake/Output Summary (Last 24 hours) at 2/2/2025 1213  Last data filed at 2/1/2025 1457  Gross per 24 hour   Intake 500 ml   Output 1500 ml   Net -1000 ml            Laboratory  Recent Labs     01/31/25  0206 02/02/25  0022   WBC 7.2 8.6   RBC 3.09* 3.21*   HEMOGLOBIN 9.7* 10.1*   HEMATOCRIT 31.4* 31.2*   .6* 97.2   MCH 31.4 31.5   MCHC 30.9* 32.4   RDW 63.0* 58.7*   PLATELETCT 151* 167   MPV 10.6 9.8       Recent Labs     01/30/25  1237 01/31/25  0206 02/02/25  0022   SODIUM 138 136 136   POTASSIUM 4.6 4.2 4.4   CHLORIDE 102 100 98   CO2 24 19* 27   GLUCOSE 128* 121* 173*   BUN 55* 55* 18   CREATININE 3.39* 3.68* 2.05*   CALCIUM 8.1* 7.4* 8.2*                   Imaging  IR-CVC TUNNEL W/O PORT REMOVAL   Final Result      Successful removal of tunneled central venous catheter as described.      MR-CERVICAL SPINE-WITH & W/O   Final Result      1.  Postoperative changes as described above.   2.  C7-T1 discitis and osteomyelitis. No significant epidural phlegmon or epidural abscess pocket.   3.  C7-T1 moderate disc/osteophyte change with mild central stenosis.   4.  Multilevel foraminal stenoses as outlined above.   5.  No myelopathic cord signal.      EC-RAFIQ W/O CONT   Final Result      US-EXTREMITY VENOUS UPPER UNILAT LEFT   Final Result      EC-ECHOCARDIOGRAM COMPLETE W/O CONT   Final Result      MR-CERVICAL SPINE-WITH & W/O   Final Result         1.  Postoperative changes in the cervical spine as described above.      2.  Abnormal signal is noted in the prevertebral soft tissues extending from the mid C2 to the C7-T1 level. Abnormal marrow signal is noted at the C7-T1 level with mild enhancement of the intervertebral disc space at this  level. These findings are    concerning for discitis-osteomyelitis.      3.  There is moderate spinal canal stenosis at C6-7.         US-RUQ   Final Result      1.  Prior cholecystectomy.      2.  Common bile duct diameter measured at 12 mm with some intrahepatic biliary ductal dilatation. This may be related to presence of prior cholecystectomy.      3.  The liver is heterogeneous consistent with fatty change versus hepatocellular dysfunction.      DX-CHEST-PORTABLE (1 VIEW)   Final Result         1.  No acute cardiopulmonary disease.   2.  Atherosclerosis           Assessment/Plan  * Radiculopathy affecting upper extremity- (present on admission)  Assessment & Plan  Her pain has been debilitating despite Neurontin and Cymbalta.  She had the abnormal MRI noted below.  Because of this the MRI will be repeated as she may benefit from spine surgery consultation for either inpatient or outpatient management based on the results.  .  She will be given oral narcotics and no further IV narcotics at this time in order to start the transition of discharge home eventually on oral   Continue on Cymbalta, gabapentin  MRI C-spine showed osteomyelitis/discitis  Requiring frequent IV narcotics, close monitoring for toxicity while on IV controlled substance.   Neurosurgery recommending conservative management, no surgical intervention at this time  Infectious disease consulted    Bacteremia- (present on admission)  Assessment & Plan  1/2 blood cultures Enterococcus  Infectious disease consulted  -Changed to vancomycin  -Echocardiogram showed endocarditis  -Repeat blood cultures per ID  Remove HD catheter 2/1, plan for 72-hour line holiday    Right atrial mass- (present on admission)  Assessment & Plan  TTE showed possible tricuspid and mitral valve endocarditis  Cardiology consulted, RAFIQ 1/30- showed right atrial mass with mobile density   Unclear if this is mass versus clot versus infection  Discussed with IR, CT would not  delineate this  Infectious disease following  -Will treat for endocarditis    Acute osteomyelitis of cervical spine ([facility])- (present on admission)  Assessment & Plan  MRI showed C7-T1 findings concerning for discitis osteomyelitis  Infectious disease consulted  -Vancomycin  -repeat C spine mri pending  Follow-up blood culture result  Neurosurgery recommended conservative management with IV antibiotics, no surgical intervention  Discussed with IR, unable to perform cervical spine bone biopsy    CKD (chronic kidney disease) stage 4, GFR 15-29 ml/min ([facility])- (present on admission)  Assessment & Plan  Continue on torsemide 100 mg daily  Repeat BMP in a.m. to monitor renal function  Nephrology following for dialysis needs      Intractable back pain- (present on admission)  Assessment & Plan  Also complaining of severe burning neuropathic pain in her arm  Increase gabapentin 300 mg daily, continue duloxetine  Start scheduled Tylenol 500 mg every 6 hours  Continue oral and IV opiates as needed    Type 2 diabetes mellitus, with long-term current use of insulin ([facility])- (present on admission)  Assessment & Plan  On sliding scale and glargine  Monitor of for hypoglycemic episode    DNR (do not resuscitate)- (present on admission)  Assessment & Plan  Per her wishes  She has been followed by palliative care in the past        ESRD needing dialysis ([facility])- (present on admission)  Assessment & Plan  She has a left arm fistula  Nephrology will be consulted for dialysis    CAD S/P percutaneous coronary angioplasty- (present on admission)  Assessment & Plan  History of    Primary hypertension- (present on admission)  Assessment & Plan  Continue Norvasc and Toprol with holding parameters    Total time spent in chart review, at bedside with the patient, discussing with consultants, nursing and case management: 53 minutes    VTE prophylaxis: heparin SC    I have performed a physical exam and reviewed and updated ROS and Plan today  (2/2/2025). In review of yesterday's note (2/1/2025), there are no changes except as documented above.

## 2025-02-02 NOTE — CONSULTS
"[facility] Psychotherapy Session Summary (New)    Name: Anu Manuel  MRN: [id number]  : 1955  Age: 69 y.o.  Date of assessment: 25  PCP: ZOË Maxwell  Persons in attendance: Patient    HPI: Per Medical Record:  \"Anu Manuel is a 69 y.o. female who presented 2025 with bilateral arm pain.  Ms. Mnauel has a past medical history of insulin-dependent diabetes mellitus, end-stage renal disease on dialysis via a right tunneled catheter, coronary artery disease that was most recently admitted here  through January 3 with acute on chronic back pain and then was admitted again from 2025 through 2025.  She presents today with bilateral arm pain she feels is coming from her neck and cannot go to dialysis because of it and complains of hopelessness.  Upon review of the records the MRI of the cervical spine from 2025 revealed abnormal bone marrow signal with raising the possibility of infection and short-term follow-up imaging was recommended.  Ms. Manuel received IV Dilaudid in the emergency room will be placed under observation status with oral oxycodone for pain management for now.  MRI has been ordered to repeat with contrast and she will have dialysis by nephrology as well.\" [facility] for a psychotherapy session due to anxiety.    Psychotherapy Session Summary  Patient was seen lying on hospital bed, sleeping but easy to arouse. Patient was pleasant and was initially willing to engage in the psychotherapy session. Patient's speech was clear and coherent, no sign of a thought disorder. Patient denies suicidal or homicidal ideations. Patient was preparing to eat breakfast, reports no problems with appetite or sleep.     Patient reports anxiety related to her current medical condition and the pain she experiences throughout the day. Patient reports difficulty managing the pain, which results in " "anxious responses. Clinician processed with patient the extent of her anxiety, and presented ways to manage these symptoms more effectively.    Patient reports receiving support from her adult children although patient reports, \"It took a while to engage them to help them understand what I was going through\". Patient states she also has other relatives she finds helpful and supportive. Patient states, \"these relationships are important because I loss my  of 47 years and other relatives in a short amount of time\". Patient continues, \"the loss of my  hit me the hardest\". Patient believes she is still grieving over the passing of her .    As the session progressed, patient became increasing agitated and wanted to end the session to get pain medication stating her pain was becoming increasingly worse. Patient agreed to continue our work at another time. Met briefly with physician and RN to discuss patients pain management concerns. Protocols are in place to respond to patients pain issues. Will continue to follow and provide support as needed.      Chief Complaint   Patient presents with    Arm Pain     BIBA from home for c/o BUE pain for 4 weeks, EMS gave 100mcg fentanyl IVP.  Missed HD and pain management d/t pain            Psychiatric Review of Systems    FITO-7 Questionnaire  Feeling nervous, anxious, or on edge:  More than half the days   Not being able to sop or control worrying:  More than half the days   Worrying too much about different things:  More than half the days   Trouble relaxing:  More than half the days   Being so restless that it's hard to sit still:  More than half the days   Becoming easily annoyed or irritable:  More than half the days   Feeling afraid as if something awful might happen:  More than half the days   Total:  14   How difficult  have these problems made it for you to do your work, take care of things at home, or get along with other people? - Very " difficult    Interpretation of FITO 7 Total Score   Score Severity: 0-4 No Anxiety, 5-9 Mild Anxiety, 10-14 Moderate Anxiety, 15-21 Severe Anxiety    PHQ-9 Depression Screening    Little interest or pleasure in doing things?  1 - several days   Feeling down, depressed, or hopeless?  1 - several days   Trouble falling or staying asleep, or sleeping too much?   1 - several days   Feeling tired or having little energy?  1 - several days   Poor appetite or overeating?   0 - not at all   Feeling bad about yourself - or that you are a failure or have let yourself or your family down?  0 - not at all   Trouble concentrating on things, such as reading the newspaper or watching television?  1 - several days   Moving or speaking so slowly that other people could have noticed.  Or the opposite - being so fidgety or restless that you have been moving around a lot more than usual?   0 - not at all   Thoughts that you would be better off dead, or of hurting yourself?   0 - not at all   Patient Health Questionnaire Score:  5     Interpretation of PHQ-9 Total Score   Score Severity: 1-4 No Depression, 5-9 Mild Depression, 10-14 Moderate Depression, 15-19 Moderately Severe Depression, 20-27 Severe Depression    MoCA Performed?:  N/A      Behavioral Health Treatment History  Does patient/parent report a history of prior behavioral health treatment for patient? No:    SAFETY ASSESSMENT - SELF  Does patient acknowledge current or past symptoms of dangerousness to self? no   Does parent/significant other report patient has current or past symptoms of dangerousness to self? N\A     Does presenting problem suggest symptoms of dangerousness to self? No      SAFETY ASSESSMENT - OTHERS  Does patient acknowledge current or past symptoms of aggressive behavior or risk to others? no   Does parent/significant other report patient has current or past symptoms of aggressive behavior or risk to others? N\A     Does presenting problem suggest  "symptoms of dangerousness to others?  No    Crisis Safety Plan completed and copy given to patient? N\A    SUBSTANCE USE SCREENING  Yes:  Robert all substances used in the past 30 days:      Last Use Amount   []   Alcohol     []   Marijuana     []   Heroin     []   Prescription Opioids  (used without prescription, for    recreation, or in excess of prescribed amount)     []   Other Prescription  (used without prescription, for    recreation, or in excess of prescribed amount)     []   Cocaine      []   Methamphetamine     []   \"\" drugs (ectasy, MDMA)     []   Other substances        UDS results: Not assessed  Breathalyzer results: Not assessed    What consequences does the patient associate with any of the above substance use and or addictive behaviors? None    Risk factors for detox (check all that apply):  []  Seizures   []  Diaphoretic (sweating)   []  Tremors   []  Hallucinations   []  Increased blood pressure   []  Decreased blood pressure   []  Other   []  None      [] Patient education on risk factors for detoxification and instructed to return to ER as needed.    MENTAL STATUS  Participation Active verbal participation   Grooming Casual   Orientation Alert   Behavior Tense   Eye contact Limited   Mood Anxious   Affect Anxious   Thought Process Circumstantial   Thought Content Rumination   Speech Rate within normal limits   Perception Within normal limits   Memory No gross evidence of memory deficits   Insight Adequate   Judgement Adequate   Other        Collateral Information:   Source: [facility] Nursing Staff, [facility] Medical Record, and  Other: Physician    Unable to complete full assessment due to:  NA - Assessment completed    CLINICAL IMPRESSIONS:  Primary:  Psychiatry diagnosis: MDD, recurrent, moderate with anxious distress   Secondary:                                         Recommendations and Observation Level:  Patient would benefit from continued psychotherapy support while in the hospital. " Case Management please provide patient with outpatient psychotherapy resources prior to discharge.    Thank you,    Legal Hold: N/A    Uyen Lopez, Ph.D., [facility]  2/2/2025    Length of Intervention:  30 minutes

## 2025-02-03 LAB
ALBUMIN SERPL BCP-MCNC: 3 G/DL (ref 3.2–4.9)
BACTERIA BLD CULT: ABNORMAL
BACTERIA BLD CULT: NORMAL
BACTERIA BLD CULT: NORMAL
BUN SERPL-MCNC: 33 MG/DL (ref 8–22)
CALCIUM ALBUM COR SERPL-MCNC: 8.6 MG/DL (ref 8.5–10.5)
CALCIUM SERPL-MCNC: 7.8 MG/DL (ref 8.5–10.5)
CC # CATH TIP CULT: NORMAL /ML
CHLORIDE SERPL-SCNC: 101 MMOL/L (ref 96–112)
CO2 SERPL-SCNC: 28 MMOL/L (ref 20–33)
CREAT SERPL-MCNC: 3.22 MG/DL (ref 0.5–1.4)
CRP SERPL HS-MCNC: 2.53 MG/DL (ref 0–0.75)
ERYTHROCYTE [DISTWIDTH] IN BLOOD BY AUTOMATED COUNT: 60 FL (ref 35.9–50)
ERYTHROCYTE [SEDIMENTATION RATE] IN BLOOD BY WESTERGREN METHOD: 99 MM/HOUR (ref 0–25)
GFR SERPLBLD CREATININE-BSD FMLA CKD-EPI: 15 ML/MIN/1.73 M 2
GLUCOSE BLD STRIP.AUTO-MCNC: 292 MG/DL (ref 65–99)
GLUCOSE BLD STRIP.AUTO-MCNC: 306 MG/DL (ref 65–99)
GLUCOSE BLD STRIP.AUTO-MCNC: 351 MG/DL (ref 65–99)
GLUCOSE BLD STRIP.AUTO-MCNC: 76 MG/DL (ref 65–99)
GLUCOSE SERPL-MCNC: 60 MG/DL (ref 65–99)
HCT VFR BLD AUTO: 32.4 % (ref 37–47)
HGB BLD-MCNC: 10.4 G/DL (ref 12–16)
MCH RBC QN AUTO: 31.5 PG (ref 27–33)
MCHC RBC AUTO-ENTMCNC: 32.1 G/DL (ref 32.2–35.5)
MCV RBC AUTO: 98.2 FL (ref 81.4–97.8)
PHOSPHATE SERPL-MCNC: 3.5 MG/DL (ref 2.5–4.5)
PLATELET # BLD AUTO: 179 K/UL (ref 164–446)
PMV BLD AUTO: 10.3 FL (ref 9–12.9)
POTASSIUM SERPL-SCNC: 3.8 MMOL/L (ref 3.6–5.5)
RBC # BLD AUTO: 3.3 M/UL (ref 4.2–5.4)
SIGNIFICANT IND 70042: ABNORMAL
SIGNIFICANT IND 70042: NORMAL
SITE SITE: ABNORMAL
SITE SITE: NORMAL
SODIUM SERPL-SCNC: 140 MMOL/L (ref 135–145)
SOURCE SOURCE: ABNORMAL
SOURCE SOURCE: NORMAL
VANCOMYCIN SERPL-MCNC: 15.2 UG/ML
WBC # BLD AUTO: 7.4 K/UL (ref 4.8–10.8)

## 2025-02-03 PROCEDURE — A9270 NON-COVERED ITEM OR SERVICE: HCPCS | Performed by: INTERNAL MEDICINE

## 2025-02-03 PROCEDURE — 700105 HCHG RX REV CODE 258: Performed by: INTERNAL MEDICINE

## 2025-02-03 PROCEDURE — A9270 NON-COVERED ITEM OR SERVICE: HCPCS | Performed by: HOSPITALIST

## 2025-02-03 PROCEDURE — 99232 SBSQ HOSP IP/OBS MODERATE 35: CPT | Performed by: INTERNAL MEDICINE

## 2025-02-03 PROCEDURE — 99233 SBSQ HOSP IP/OBS HIGH 50: CPT | Performed by: INTERNAL MEDICINE

## 2025-02-03 PROCEDURE — 700102 HCHG RX REV CODE 250 W/ 637 OVERRIDE(OP): Performed by: HOSPITALIST

## 2025-02-03 PROCEDURE — 87040 BLOOD CULTURE FOR BACTERIA: CPT

## 2025-02-03 PROCEDURE — 700111 HCHG RX REV CODE 636 W/ 250 OVERRIDE (IP): Performed by: INTERNAL MEDICINE

## 2025-02-03 PROCEDURE — 80202 ASSAY OF VANCOMYCIN: CPT

## 2025-02-03 PROCEDURE — 82962 GLUCOSE BLOOD TEST: CPT | Mod: 91

## 2025-02-03 PROCEDURE — 36415 COLL VENOUS BLD VENIPUNCTURE: CPT

## 2025-02-03 PROCEDURE — 80069 RENAL FUNCTION PANEL: CPT

## 2025-02-03 PROCEDURE — 85652 RBC SED RATE AUTOMATED: CPT

## 2025-02-03 PROCEDURE — 85027 COMPLETE CBC AUTOMATED: CPT

## 2025-02-03 PROCEDURE — 99231 SBSQ HOSP IP/OBS SF/LOW 25: CPT | Performed by: STUDENT IN AN ORGANIZED HEALTH CARE EDUCATION/TRAINING PROGRAM

## 2025-02-03 PROCEDURE — 770006 HCHG ROOM/CARE - MED/SURG/GYN SEMI*

## 2025-02-03 PROCEDURE — 86140 C-REACTIVE PROTEIN: CPT

## 2025-02-03 PROCEDURE — 700102 HCHG RX REV CODE 250 W/ 637 OVERRIDE(OP): Performed by: INTERNAL MEDICINE

## 2025-02-03 PROCEDURE — 700102 HCHG RX REV CODE 250 W/ 637 OVERRIDE(OP): Performed by: NEUROLOGICAL SURGERY

## 2025-02-03 PROCEDURE — A9270 NON-COVERED ITEM OR SERVICE: HCPCS | Performed by: NEUROLOGICAL SURGERY

## 2025-02-03 RX ADMIN — OXYCODONE HYDROCHLORIDE 10 MG: 10 TABLET, FILM COATED, EXTENDED RELEASE ORAL at 11:37

## 2025-02-03 RX ADMIN — OXYCODONE HYDROCHLORIDE 10 MG: 10 TABLET ORAL at 05:00

## 2025-02-03 RX ADMIN — VENLAFAXINE HYDROCHLORIDE 150 MG: 75 CAPSULE, EXTENDED RELEASE ORAL at 05:00

## 2025-02-03 RX ADMIN — METHOCARBAMOL 750 MG: 750 TABLET ORAL at 18:48

## 2025-02-03 RX ADMIN — PREGABALIN 25 MG: 25 CAPSULE ORAL at 19:54

## 2025-02-03 RX ADMIN — AMLODIPINE BESYLATE 10 MG: 10 TABLET ORAL at 05:00

## 2025-02-03 RX ADMIN — METOPROLOL SUCCINATE 50 MG: 50 TABLET, EXTENDED RELEASE ORAL at 18:26

## 2025-02-03 RX ADMIN — INSULIN GLARGINE-YFGN 8 UNITS: 100 INJECTION, SOLUTION SUBCUTANEOUS at 19:55

## 2025-02-03 RX ADMIN — INSULIN LISPRO 5 UNITS: 100 INJECTION, SOLUTION INTRAVENOUS; SUBCUTANEOUS at 12:42

## 2025-02-03 RX ADMIN — VANCOMYCIN HYDROCHLORIDE 1000 MG: 5 INJECTION, POWDER, LYOPHILIZED, FOR SOLUTION INTRAVENOUS at 20:35

## 2025-02-03 RX ADMIN — BUSPIRONE HYDROCHLORIDE 5 MG: 10 TABLET ORAL at 05:00

## 2025-02-03 RX ADMIN — BUSPIRONE HYDROCHLORIDE 5 MG: 10 TABLET ORAL at 18:27

## 2025-02-03 RX ADMIN — OXYCODONE HYDROCHLORIDE 10 MG: 10 TABLET ORAL at 10:01

## 2025-02-03 RX ADMIN — METHOCARBAMOL 750 MG: 750 TABLET ORAL at 05:01

## 2025-02-03 RX ADMIN — INSULIN LISPRO 2 UNITS: 100 INJECTION, SOLUTION INTRAVENOUS; SUBCUTANEOUS at 23:57

## 2025-02-03 RX ADMIN — TORSEMIDE 100 MG: 100 TABLET ORAL at 06:52

## 2025-02-03 RX ADMIN — ATORVASTATIN CALCIUM 40 MG: 40 TABLET, FILM COATED ORAL at 18:26

## 2025-02-03 RX ADMIN — OXYCODONE HYDROCHLORIDE 10 MG: 10 TABLET ORAL at 19:52

## 2025-02-03 RX ADMIN — ACETAMINOPHEN 500 MG: 500 TABLET ORAL at 18:27

## 2025-02-03 RX ADMIN — SENNOSIDES AND DOCUSATE SODIUM 2 TABLET: 50; 8.6 TABLET ORAL at 19:53

## 2025-02-03 RX ADMIN — INSULIN LISPRO 8 UNITS: 100 INJECTION, SOLUTION INTRAVENOUS; SUBCUTANEOUS at 18:23

## 2025-02-03 RX ADMIN — PETROLATUM: 420 OINTMENT TOPICAL at 19:53

## 2025-02-03 RX ADMIN — LEVOTHYROXINE SODIUM 250 MCG: 0.12 TABLET ORAL at 05:01

## 2025-02-03 RX ADMIN — PETROLATUM: 420 OINTMENT TOPICAL at 05:01

## 2025-02-03 RX ADMIN — TRAZODONE HYDROCHLORIDE 150 MG: 50 TABLET ORAL at 22:02

## 2025-02-03 RX ADMIN — ACETAMINOPHEN 500 MG: 500 TABLET ORAL at 11:18

## 2025-02-03 RX ADMIN — METHOCARBAMOL 750 MG: 750 TABLET ORAL at 11:19

## 2025-02-03 RX ADMIN — ACETAMINOPHEN 500 MG: 500 TABLET ORAL at 05:00

## 2025-02-03 RX ADMIN — OXYCODONE HYDROCHLORIDE 10 MG: 10 TABLET, FILM COATED, EXTENDED RELEASE ORAL at 23:57

## 2025-02-03 ASSESSMENT — ENCOUNTER SYMPTOMS
EYES NEGATIVE: 1
BACK PAIN: 1
HEMOPTYSIS: 0
WHEEZING: 0
SINUS PAIN: 0
FEVER: 0
DIARRHEA: 0
ORTHOPNEA: 0
VOMITING: 0
NECK PAIN: 1
NAUSEA: 0
COUGH: 0
NERVOUS/ANXIOUS: 1
HEADACHES: 0
SHORTNESS OF BREATH: 0
ABDOMINAL PAIN: 0
CHILLS: 0
PALPITATIONS: 0
WEIGHT LOSS: 0

## 2025-02-03 ASSESSMENT — PAIN DESCRIPTION - PAIN TYPE
TYPE: ACUTE PAIN
TYPE: ACUTE PAIN;CHRONIC PAIN
TYPE: ACUTE PAIN

## 2025-02-03 NOTE — CARE PLAN
The patient is Stable - Low risk of patient condition declining or worsening    Shift Goals  Clinical Goals: will remain at a pain comfort level tolerable  Patient Goals: comfort and safety  Family Goals: RILEY    Progress made toward(s) clinical / shift goals: Pt has been able to be involved in POC and has been positive about treatment plan

## 2025-02-03 NOTE — PROGRESS NOTES
Hospital Medicine Daily Progress Note    Date of Service  2/3/2025    Chief Complaint  Anu Manuel is a 69 y.o. female admitted 1/22/2025 with neck pain     Hospital Course  Anu Manuel is a 69 y.o. female with past medical history of insulin-dependent diabetes mellitus, ESRD on dialysis, CAD, recent admission for acute on chronic back pain who presented 1/22/2025 with bilateral arm pain.  MRI of the cervical spine from 1/8/2025 revealed abnormal bone marrow signal with raising the possibility of infection and short-term follow-up imaging was recommended. Nephrology consulted for scheduled dialysis.  MRI cervical spine showed C7-T1 findings concerning for discitis/osteomyelitis with moderate spinal canal stenosis at C6-7.  Neurosurgery was consulted recommended IR bone biopsy and conservative management.  Did not recommend surgery at this time.  Discussed with IR however they do not perform cervical bone biopsies.  Surgery then recommended ID consults and conservative treatment of antibiotics.  Patient was positive for Enterococcus faecium bacteremia and infectious disease was consulted started on IV antibiotics.  Initial echocardiogram was concerning for endocarditis and cardiology was consulted for RAFIQ which was performed 1/30 showed heterogenous echogenic density seen attached to the posterior/inferior right atrial wall by entrance of IVC with multiple friable small linear densities seen coming off.  ID recommending treatment for endocarditis.  Due to severe ongoing pain repeat MRI was done that was stable as well as venous ultrasound of her upper extremity which was negative.  HD catheter was removed 2/1 and patient will need a line holiday for 72 hours.  Infectious disease recommending 6 weeks of IV vancomycin 3 times weekly with dialysis end date 3/15/2025.  HD catheter has been cleared to be replaced on 2/4 by IR.     During hospitalization patient with severe anxiety.  Psychiatry was  consulted and recommended to start BuSpar 5 mg twice daily with Atarax as needed and psychotherapy.    Interval Problem Update  2/3 no acute events overnight  WBC 7.4, hemoglobin 10.4  Creatinine 3.22, GFR 15, electrolytes stable  Glucose in the 400s yesterday, per bedside nurse she had been eating candy  Repeat blood cultures 1/29 negative to date  Discussed with infectious disease okay to have IR place dialysis catheter tomorrow, she will be discharged on vancomycin with dialysis and will need to follow-up with infectious disease clinic in 2 weeks  IR consult placed, n.p.o. at midnight  Discussed with nephrology, they will be able to accommodate patient's 3 times a week dialysis while on vancomycin once patient is discharged  Patient doing well, denies any new complaints.  Pain appears controlled at time of my evaluation, still complains of mid back pain between her shoulder blades.     I have discussed this patient's plan of care and discharge plan at IDT rounds today with Case Management, Nursing, Nursing leadership, and other members of the IDT team.    Consultants/Specialty  nephrology  Infectious disease  Cardiology  Psychiatry    Code Status  DNAR/DNI    Disposition  The patient is not medically cleared for discharge to home or a post-acute facility.  Anticipate discharge to: home with close outpatient follow-up    I have placed the appropriate orders for post-discharge needs.    Review of Systems  Review of Systems   Constitutional:  Positive for malaise/fatigue. Negative for fever.   Respiratory:  Negative for shortness of breath.    Cardiovascular:  Negative for chest pain and leg swelling.   Gastrointestinal:  Negative for abdominal pain and nausea.   Musculoskeletal:  Positive for back pain, joint pain and neck pain.   Neurological:  Negative for headaches.   Psychiatric/Behavioral:  The patient is nervous/anxious.         Physical Exam  Temp:  [36.2 °C (97.1 °F)-36.6 °C (97.9 °F)] 36.2 °C (97.1  °F)  Pulse:  [63-85] 80  Resp:  [14-20] 14  BP: (115-146)/(54-75) 146/75  SpO2:  [93 %-98 %] 98 %    Physical Exam  Vitals and nursing note reviewed.   Constitutional:       General: She is not in acute distress.     Comments: Laying comfortably in bed   HENT:      Mouth/Throat:      Pharynx: Oropharynx is clear.   Eyes:      Conjunctiva/sclera: Conjunctivae normal.   Cardiovascular:      Rate and Rhythm: Normal rate and regular rhythm.   Pulmonary:      Effort: Pulmonary effort is normal. No respiratory distress.      Breath sounds: No wheezing.   Abdominal:      General: There is no distension.      Palpations: Abdomen is soft.      Tenderness: There is no abdominal tenderness.   Musculoskeletal:         General: Tenderness present.      Right lower leg: No edema.      Left lower leg: No edema.   Skin:     General: Skin is warm.   Neurological:      General: No focal deficit present.      Mental Status: She is alert and oriented to person, place, and time.      Motor: No weakness.      Comments: Moving all extremities spontaneously   Psychiatric:         Mood and Affect: Mood normal.         Behavior: Behavior normal. Behavior is cooperative.         Fluids    Intake/Output Summary (Last 24 hours) at 2/3/2025 1427  Last data filed at 2/2/2025 1621  Gross per 24 hour   Intake 330 ml   Output --   Net 330 ml            Laboratory  Recent Labs     02/02/25  0022 02/03/25  0331   WBC 8.6 7.4   RBC 3.21* 3.30*   HEMOGLOBIN 10.1* 10.4*   HEMATOCRIT 31.2* 32.4*   MCV 97.2 98.2*   MCH 31.5 31.5   MCHC 32.4 32.1*   RDW 58.7* 60.0*   PLATELETCT 167 179   MPV 9.8 10.3       Recent Labs     02/02/25  0022 02/03/25  0331   SODIUM 136 140   POTASSIUM 4.4 3.8   CHLORIDE 98 101   CO2 27 28   GLUCOSE 173* 60*   BUN 18 33*   CREATININE 2.05* 3.22*   CALCIUM 8.2* 7.8*                   Imaging  IR-CVC TUNNEL W/O PORT REMOVAL   Final Result      Successful removal of tunneled central venous catheter as described.      MR-CERVICAL  SPINE-WITH & W/O   Final Result      1.  Postoperative changes as described above.   2.  C7-T1 discitis and osteomyelitis. No significant epidural phlegmon or epidural abscess pocket.   3.  C7-T1 moderate disc/osteophyte change with mild central stenosis.   4.  Multilevel foraminal stenoses as outlined above.   5.  No myelopathic cord signal.      EC-RAFIQ W/O CONT   Final Result      US-EXTREMITY VENOUS UPPER UNILAT LEFT   Final Result      EC-ECHOCARDIOGRAM COMPLETE W/O CONT   Final Result      MR-CERVICAL SPINE-WITH & W/O   Final Result         1.  Postoperative changes in the cervical spine as described above.      2.  Abnormal signal is noted in the prevertebral soft tissues extending from the mid C2 to the C7-T1 level. Abnormal marrow signal is noted at the C7-T1 level with mild enhancement of the intervertebral disc space at this level. These findings are    concerning for discitis-osteomyelitis.      3.  There is moderate spinal canal stenosis at C6-7.         US-RUQ   Final Result      1.  Prior cholecystectomy.      2.  Common bile duct diameter measured at 12 mm with some intrahepatic biliary ductal dilatation. This may be related to presence of prior cholecystectomy.      3.  The liver is heterogeneous consistent with fatty change versus hepatocellular dysfunction.      DX-CHEST-PORTABLE (1 VIEW)   Final Result         1.  No acute cardiopulmonary disease.   2.  Atherosclerosis           Assessment/Plan  * Radiculopathy affecting upper extremity- (present on admission)  Assessment & Plan  Her pain has been debilitating despite Neurontin and Cymbalta.  She had the abnormal MRI noted below.  Because of this the MRI will be repeated as she may benefit from spine surgery consultation for either inpatient or outpatient management based on the results.  .  She will be given oral narcotics and no further IV narcotics at this time in order to start the transition of discharge home eventually on oral   Continue on  Cymbalta, gabapentin  MRI C-spine showed osteomyelitis/discitis  Requiring frequent IV narcotics, close monitoring for toxicity while on IV controlled substance.   Neurosurgery recommending conservative management, no surgical intervention at this time  Infectious disease consulted    Bacteremia- (present on admission)  Assessment & Plan  1/2 blood cultures Enterococcus  Infectious disease consulted  -Changed to vancomycin  -RAFIQ with atrial clot concerning for infective endocarditis  -Repeat blood cultures negative to date  Removed HD catheter 2/1  IR consult for permacath replacement 2/4  -N.p.o. at midnight    Once HD catheter in place and cleared by nephrology patient will need IV vancomycin 3 times daily with dialysis with end date 3/15/2025  Outpatient follow-up with infectious disease clinic in 2 weeks    Right atrial mass- (present on admission)  Assessment & Plan  TTE showed possible tricuspid and mitral valve endocarditis  Cardiology consulted, RAFIQ 1/30- showed right atrial mass with mobile density   Unclear if this is mass versus clot versus infection  Discussed with IR, CT would not delineate this  Infectious disease following  -Will treat for endocarditis    Acute osteomyelitis of cervical spine (HCC)- (present on admission)  Assessment & Plan  MRI showed C7-T1 findings concerning for discitis osteomyelitis  Infectious disease consulted  -Vancomycin 3 times a week with dialysis with end date 3/15/2025  -repeat C spine mri pending  Follow-up blood culture result  Neurosurgery recommended conservative management with IV antibiotics, no surgical intervention  Discussed with IR, unable to perform cervical spine bone biopsy    CKD (chronic kidney disease) stage 4, GFR 15-29 ml/min ([facility])- (present on admission)  Assessment & Plan  Continue on torsemide 100 mg daily  Repeat BMP in a.m. to monitor renal function  Nephrology following for dialysis needs      Intractable back pain- (present on  admission)  Assessment & Plan  Also complaining of severe burning neuropathic pain in her arm  Increase gabapentin 300 mg daily, continue duloxetine  Start scheduled Tylenol 500 mg every 6 hours  Continue oral and IV opiates as needed    Type 2 diabetes mellitus, with long-term current use of insulin (HCC)- (present on admission)  Assessment & Plan  On sliding scale and glargine  Monitor of for hypoglycemic episode    DNR (do not resuscitate)- (present on admission)  Assessment & Plan  Per her wishes  She has been followed by palliative care in the past        ESRD needing dialysis (HCC)- (present on admission)  Assessment & Plan  She has a left arm fistula  Nephrology will be consulted for dialysis    CAD S/P percutaneous coronary angioplasty- (present on admission)  Assessment & Plan  History of    Primary hypertension- (present on admission)  Assessment & Plan  Continue Norvasc and Toprol with holding parameters    Total time spent in chart review, at bedside with the patient, discussing with consultants, nursing and case management: 56 minutes    VTE prophylaxis: heparin SC    I have performed a physical exam and reviewed and updated ROS and Plan today (2/3/2025). In review of yesterday's note (2/2/2025), there are no changes except as documented above.

## 2025-02-03 NOTE — CARE PLAN
Problem: Knowledge Deficit - Standard  Goal: Patient and family/care givers will demonstrate understanding of plan of care, disease process/condition, diagnostic tests and medications  Outcome: Progressing  Note: Education provided to the pt all questions answered, teach back method used.      Problem: Pain - Standard  Goal: Alleviation of pain or a reduction in pain to the patient’s comfort goal  Outcome: Met  Note: Pain has been managed at comfort level throughout my entire shift. Repositioning complete, and extra pillows and blankets given for comfort.      Problem: Fall Risk  Goal: Patient will remain free from falls  Outcome: Met  Note: Pt has remained free from falls throughout my entire shift.    The patient is Stable - Low risk of patient condition declining or worsening    Shift Goals  Clinical Goals: Pts pain will remain at comfort level througout my shift, pt will remain free from falls  Patient Goals: Comfort and safety  Family Goals: RILEY    Progress made toward(s) clinical / shift goals: Pt's pain has been managed at comfort level throughout my entire shift. Education provided to the pt all questions answered. Call light left within reach, hourly rounding in place

## 2025-02-03 NOTE — DISCHARGE PLANNING
"HTH/SCP TCN chart review completed. Collaborated with BOBBY Bright.  Current discharge considerations are for home with possible HH and close outpatient f/u when medically cleared.  Patient seen at bedside. Patient reports owning a FWW and 4WW/SPC.  Per ID note on 2/3/25,  \"On discharge will continue vancomycin via dialysis and will need to be ordered by nephrology.  Recommend 6 weeks of IV antibiotics from date of catheter removal, end 3/15/25 -limited antibiotic options particular for the Enterococcus faecium so will likely stop antibiotics and monitor at that point if she is doing well and labs unremarkable.  If concern for ongoing infection we would then order a repeat MRI and extend the antibiotics\".  (At time of writing this note).  Nephrology following.  Patient established with [facility] in [place].  Patient will need HH order prior to discharge as recommended by PT.     TCN will continue to follow and collaborate with discharge planning team as additional post acute needs arise. Thank you.    Completed:  PT recommends home health - 1/29  OT no needs on 1/31.   Choice obtained: HH (1. [facility] 2. [facility])  Pt aware of [facility] blanket referral policy  Noted [facility] has declined patient  SCP with [facility] PCP     "

## 2025-02-03 NOTE — PROGRESS NOTES
Assumed care of pt 02/03/2025  PM assessment complete  Education provided to the pt all questions answered, teach back method was used.   Hourly rounding in place, call light left within reach

## 2025-02-03 NOTE — PROGRESS NOTES
Pharmacy Vancomycin Kinetics Note for 2/3/2025     69 y.o. female on Vancomycin Day # 7     Vancomycin Indication (Trough based Dosing): Non S. aureus bacteremia (goal trough 10-15)    Provider specified end date: 03/15/25    Active Antibiotics (From admission, onward)      Ordered     Ordering Provider       Mon Feb 3, 2025  2:25 PM    02/03/25 1425  vancomycin (Vancocin) 1,000 mg in  mL IVPB  (vancomycin (VANCOCIN) IV (LD + Maintenance))  ONCE         Annie Esposito D.O.       Sat Feb 1, 2025  9:35 AM    02/01/25 0935  MD Alert...Vancomycin per Pharmacy  PHARMACY TO DOSE        Question:  Indication(s) for vancomycin?  Answer:  Osteomyelitis    Annie Esposito D.O.     Dosing Weight: 58.4 kg (128 lb 12 oz)    Admission History: Admitted on 1/22/2025 for Radiculopathy affecting upper extremity [M54.10]  Intractable back pain [M54.9]    Allergies: Tape     Pertinent cultures to date:     Results       Procedure Component Value Units Date/Time    Blood Culture [[id number]]     Order Status: Sent Specimen: Blood from Peripheral     BLOOD CULTURE [[id number]]  (Abnormal)  (Susceptibility) Collected: 01/27/25 1859    Order Status: Completed Specimen: Blood from Peripheral Updated: 02/03/25 0744     Significant Indicator POS     Source BLD     Site PERIPHERAL     Culture Result Growth detected by automated blood culture system.  01/28/2025  12:13        Enterococcus faecium  If daptomycin is used for E. faecium treatment, high-dose  regimen is recommended.  The susceptibility profile for this organism indicates that  Streptomycin would not be an effective component of  combination therapy.        Staphylococcus epidermidis    CATH TIP CULTURE [[id number]] Collected: 02/01/25 1700    Order Status: Completed Specimen: Cath Tip Updated: 02/03/25 0724     Significant Indicator NEG     Source CTIP     Site HD cath tip     Culture Result No growth at 48 hours.    BLOOD CULTURE [[id number]] Collected: 01/28/25 0241     "Order Status: Completed Specimen: Blood from Peripheral Updated: 25 0500     Significant Indicator NEG     Source BLD     Site PERIPHERAL     Culture Result No growth after 5 days of incubation.    CATH TIP CULTURE [[id number]]     Order Status: No result Specimen: Cath Tip from Central Line     BLOOD CULTURE [[id number]] Collected: 25    Order Status: Completed Specimen: Blood from Peripheral Updated: 25     Significant Indicator NEG     Source BLD     Site PERIPHERAL     Culture Result No Growth  Note: Blood cultures are incubated for 5 days and  are monitored continuously.Positive blood cultures  are called to the RN and reported as soon as  they are identified.      BLOOD CULTURE [[id number]] Collected: 25    Order Status: Completed Specimen: Blood from Peripheral Updated: 25     Significant Indicator NEG     Source BLD     Site PERIPHERAL     Culture Result No Growth  Note: Blood cultures are incubated for 5 days and  are monitored continuously.Positive blood cultures  are called to the RN and reported as soon as  they are identified.            Labs:     Estimated Creatinine Clearance: 15.6 mL/min (A) (by C-G formula based on SCr of 3.22 mg/dL (H)).    Recent Labs     25  0022 25  0331   WBC 8.6 7.4     Recent Labs     25  0022 25  0331   BUN 18 33*   CREATININE 2.05* 3.22*   ALBUMIN 3.2 3.0*       Intake/Output Summary (Last 24 hours) at 2/3/2025 1539  Last data filed at 2025 1621  Gross per 24 hour   Intake 330 ml   Output --   Net 330 ml      BP (!) 146/75   Pulse 80   Temp 36.2 °C (97.1 °F) (Temporal)   Resp 14   Ht 1.676 m (5' 6\")   Wt 58.4 kg (128 lb 12 oz)   SpO2 98%  Temp (24hrs), Av.4 °C (97.5 °F), Min:36.2 °C (97.1 °F), Max:36.6 °C (97.9 °F)    List concerns for Vancomycin clearance:     ESRD    Pharmacokinetics:     Trough kinetics:     Recent Labs     25  0331   VANCORANDOM 15.2       A/P:     Day #7 of " vancomycin for E.faecium/MRSE bacteremia (both sensitive to vancomycin). Numerous potential sources including vertebral OM w/ hardware vs infected thrombus vs IE. ID/nephrology following. Hx of ESRD previously on twice weekly HD every Mon & Fri. ID recommends 6 weeks of vancomycin from date of cath removal (through 3/15).    Vancomycin trough resulted therapeutic today @ 15 (goal 10 to 15). Will require ongoing pulse-dosing w/ trough-based monitoring d/t ESRD. Difficult to gauge solidified maintenance dosing d/t numerous acute variables (level was drawn ~57 hours after large dose of 25 mg/kg on 1/31, s/p additional HD session on 2/1 after receiving contrast, & s/p permacath removal on 2/1 for planned 72-hour line holiday pending negative bcx's). Plan for new CVC tomorrow, assuming repeat bcx's remain negative. Nephro likely to increase HD frequency to thrice weekly to accommodate for ongoing vancomycin.    Vanco 1000 mg (15 mg/kg) x1 tonight.  Repeat level prior to next HD session.    Pieter Rivera, PharmD

## 2025-02-03 NOTE — PROGRESS NOTES
Nephrology Daily Progress Note    Date of Service  2/3/2025    Chief Complaint  69 y.o. female admitted 1/22/2025 with ESRD, bilateral arm weakness, found to have endocarditis and Enterococcus faecalis bacteremia     Interval Problem Update  Patient is complaining of severe headache and neck pain  1/24 patient feels better today   unfortunately 24-hour urine was not being collected  We will start 24-hour urine study for creatinine clearance today  1/26 patient is complaining of neuropathy pain in her arms  1/27 -still complains of pain, mostly in her left arm.  Denies chest pain, shortness of breath  1/29 -patient had dialysis yesterday with 0.5 L removed.  Found to have Enterococcus bacteremia.  Denies chest pain, shortness of breath, complains of ongoing neck and left arm pain.  1/30-left-sided arm pain improved, patient now has right shoulder pain.  Denies chest pain, shortness of breath.  Still urinating.  2/3 -doing better, no acute events, no new complaints  Dialysis catheter removed - if BCx negative plan to place new CVC tomorrow  Review of Systems  Review of Systems   Constitutional:  Negative for chills, fever, malaise/fatigue and weight loss.   HENT:  Negative for congestion, hearing loss and sinus pain.    Eyes: Negative.    Respiratory:  Negative for cough, hemoptysis, shortness of breath and wheezing.    Cardiovascular:  Negative for chest pain, palpitations, orthopnea and leg swelling.   Gastrointestinal:  Negative for abdominal pain, diarrhea, nausea and vomiting.   Genitourinary:  Negative for dysuria.   Skin: Negative.    All other systems reviewed and are negative.       Physical Exam  Temp:  [36.2 °C (97.1 °F)-36.6 °C (97.9 °F)] 36.2 °C (97.1 °F)  Pulse:  [63-85] 80  Resp:  [14-20] 14  BP: (115-146)/(54-75) 146/75  SpO2:  [93 %-98 %] 98 %    Physical Exam  Vitals and nursing note reviewed.   Constitutional:       General: She is not in acute distress.     Appearance: Normal appearance. She is  well-developed.   HENT:      Head: Normocephalic and atraumatic.      Nose: Nose normal.      Mouth/Throat:      Mouth: Mucous membranes are moist.      Pharynx: Oropharynx is clear.   Eyes:      Conjunctiva/sclera: Conjunctivae normal.      Pupils: Pupils are equal, round, and reactive to light.   Neck:      Thyroid: No thyromegaly.   Cardiovascular:      Rate and Rhythm: Normal rate and regular rhythm.      Pulses: Normal pulses.      Heart sounds: Normal heart sounds.      No friction rub. No gallop.   Pulmonary:      Effort: Pulmonary effort is normal. No respiratory distress.      Breath sounds: Normal breath sounds. No wheezing, rhonchi or rales.   Abdominal:      General: Bowel sounds are normal. There is no distension.      Palpations: Abdomen is soft. There is no mass.      Tenderness: There is no abdominal tenderness. There is no guarding.   Musculoskeletal:      Cervical back: Normal range of motion and neck supple.      Right lower leg: No edema.      Left lower leg: No edema.   Skin:     General: Skin is warm.      Findings: No erythema or rash.   Neurological:      General: No focal deficit present.      Mental Status: She is alert and oriented to person, place, and time.   Psychiatric:         Mood and Affect: Mood normal.         Behavior: Behavior normal.         Thought Content: Thought content normal.         Judgment: Judgment normal.     Dialysis access: Right IJ permacath.  Left arm AV graft without bruit or thrill    Fluids    Intake/Output Summary (Last 24 hours) at 2/3/2025 1205  Last data filed at 2/2/2025 1621  Gross per 24 hour   Intake 330 ml   Output --   Net 330 ml         Laboratory  Labs reviewed, pertinent labs below.  Recent Labs     02/02/25  0022 02/03/25  0331   WBC 8.6 7.4   RBC 3.21* 3.30*   HEMOGLOBIN 10.1* 10.4*   HEMATOCRIT 31.2* 32.4*   MCV 97.2 98.2*   MCH 31.5 31.5   MCHC 32.4 32.1*   RDW 58.7* 60.0*   PLATELETCT 167 179   MPV 9.8 10.3       Recent Labs      02/02/25  0022 02/03/25  0331   SODIUM 136 140   POTASSIUM 4.4 3.8   CHLORIDE 98 101   CO2 27 28   GLUCOSE 173* 60*   BUN 18 33*   CREATININE 2.05* 3.22*   CALCIUM 8.2* 7.8*               URINALYSIS:  Lab Results   Component Value Date/Time    COLORURINE DK Yellow 02/28/2024 0544    CLARITY Clear 02/28/2024 0544    SPECGRAVITY 1.021 02/28/2024 0544    PHURINE 5.5 02/28/2024 0544    KETONES Negative 02/28/2024 0544    PROTEINURIN 300 (A) 02/28/2024 0544    BILIRUBINUR Small (A) 02/28/2024 0544    UROBILU 1.0 02/28/2024 0544    NITRITE Negative 02/28/2024 0544    LEUKESTERAS Negative 02/28/2024 0544    OCCULTBLOOD Trace (A) 02/28/2024 0544     UPC  Lab Results   Component Value Date/Time    TOTPROTUR 557.0 (H) 02/28/2024 0544      Lab Results   Component Value Date/Time    CREATININEU 82.41 02/28/2024 0544         Imaging interpreted by radiologist. Imaging reports reviewed with pertinent findings below  IR-CVC TUNNEL W/O PORT REMOVAL   Final Result      Successful removal of tunneled central venous catheter as described.      MR-CERVICAL SPINE-WITH & W/O   Final Result      1.  Postoperative changes as described above.   2.  C7-T1 discitis and osteomyelitis. No significant epidural phlegmon or epidural abscess pocket.   3.  C7-T1 moderate disc/osteophyte change with mild central stenosis.   4.  Multilevel foraminal stenoses as outlined above.   5.  No myelopathic cord signal.      EC-RAFIQ W/O CONT   Final Result      US-EXTREMITY VENOUS UPPER UNILAT LEFT   Final Result      EC-ECHOCARDIOGRAM COMPLETE W/O CONT   Final Result      MR-CERVICAL SPINE-WITH & W/O   Final Result         1.  Postoperative changes in the cervical spine as described above.      2.  Abnormal signal is noted in the prevertebral soft tissues extending from the mid C2 to the C7-T1 level. Abnormal marrow signal is noted at the C7-T1 level with mild enhancement of the intervertebral disc space at this level. These findings are    concerning for  discitis-osteomyelitis.      3.  There is moderate spinal canal stenosis at C6-7.         US-RUQ   Final Result      1.  Prior cholecystectomy.      2.  Common bile duct diameter measured at 12 mm with some intrahepatic biliary ductal dilatation. This may be related to presence of prior cholecystectomy.      3.  The liver is heterogeneous consistent with fatty change versus hepatocellular dysfunction.      DX-CHEST-PORTABLE (1 VIEW)   Final Result         1.  No acute cardiopulmonary disease.   2.  Atherosclerosis            Current Facility-Administered Medications   Medication Dose Route Frequency Provider Last Rate Last Admin    MD Alert...Vancomycin per Pharmacy   Other PHARMACY TO DOSE Marianna Reeves M.D.        atorvastatin (Lipitor) tablet 40 mg  40 mg Oral Q EVENING Annie Esposito, D.O.   40 mg at 02/02/25 1700    busPIRone (Buspar) tablet 5 mg  5 mg Oral BID Annie Esposito, D.O.   5 mg at 02/03/25 0500    lidocaine (Asperflex) 4 % patch 3 Patch  3 Patch Transdermal Q24HR Annie Esposito, D.O.   3 Patch at 02/02/25 1407    oxyCODONE CR (OxyCONTIN) tablet 10 mg  10 mg Oral Q12HRS Annie Esposito, D.O.   10 mg at 02/03/25 1137    hydrOXYzine HCl (Atarax) tablet 25 mg  25 mg Oral Q6HRS PRN Bri Loiue, D.O.   25 mg at 02/02/25 2021    pregabalin (Lyrica) capsule 25 mg  25 mg Oral Q EVENING Annie Esposito, D.O.   25 mg at 02/02/25 1659    insulin GLARGINE (Lantus,Semglee) injection  8 Units Subcutaneous Q EVENING Annie Esposito, D.O.   8 Units at 02/02/25 1715    oxyCODONE immediate release (Roxicodone) tablet 10 mg  10 mg Oral Q3HRS PRN Annie Esposito, D.O.   10 mg at 02/03/25 1001    Or    oxycodone (Oxy-IR) immediate release tablet 15 mg  15 mg Oral Q3HRS PRN Annie Esposito, D.O.   15 mg at 02/02/25 1610    Or    HYDROmorphone (Dilaudid) injection 0.5 mg  0.5 mg Intravenous Q3HRS PRN Annie Esposito D.O.   0.5 mg at 02/01/25 1949    heparin intracatheter (for DIALYSIS USE ONLY) 1,800  Units  1,800 Units Intracatheter DIALYSIS PRN Ankit Diggs M.D.   1,800 Units at 02/01/25 1430    heparin intracatheter (for DIALYSIS USE ONLY) 1,800 Units  1,800 Units Intracatheter DIALYSIS PRN Ankit Diggs M.D.   1,800 Units at 02/01/25 1430    acetaminophen (Tylenol) tablet 500 mg  500 mg Oral Q6HRS Annie Esposito D.O.   500 mg at 02/03/25 1118    acetaminophen (Tylenol) tablet 500 mg  500 mg Oral Q6HRS PRN Annie Esposito, D.O.        torsemide (Demadex) tablet 100 mg  100 mg Oral Q DAY Ankit Diggs M.D.   100 mg at 02/03/25 0652    NS (Bolus) 0.9 % infusion 100 mL  100 mL Intravenous DIALYSIS PRN Ankit Diggs M.D.        methocarbamol (Robaxin) tablet 750 mg  750 mg Oral TID Tomi Rose, D.O.   750 mg at 02/03/25 1119    petrolatum 42 % ointment   Topical BID Dalton Fowler M.D.   Given at 02/03/25 0501    metoprolol SR (Toprol XL) tablet 50 mg  50 mg Oral Q EVENING Jigar Cabrales M.D.   50 mg at 02/02/25 1659    amLODIPine (Norvasc) tablet 10 mg  10 mg Oral DAILY Cr Sánchez M.D.   10 mg at 02/03/25 0500    levothyroxine (Synthroid) tablet 250 mcg  250 mcg Oral AM ES Cr Sánchez M.D.   250 mcg at 02/03/25 0501    traZODone (Desyrel) tablet 150 mg  150 mg Oral QHS Cr Sánchez M.D.   150 mg at 02/02/25 2021    venlafaxine XR (Effexor XR) capsule 150 mg  150 mg Oral DAILY Cr Sánchez M.D.   150 mg at 02/03/25 0500    heparin injection 5,000 Units  5,000 Units Subcutaneous Q8HRS Annie Esposito D.O.   5,000 Units at 02/02/25 2021    senna-docusate (Pericolace Or Senokot S) 8.6-50 MG per tablet 2 Tablet  2 Tablet Oral Q EVENING Cr Sánchez M.D.   2 Tablet at 02/02/25 1700    And    polyethylene glycol/lytes (Miralax) Packet 1 Packet  1 Packet Oral QDAY PRN Cr Sánchez M.D.   1 Packet at 01/24/25 0512    insulin lispro (HumaLOG,AdmeLOG) subcutaneous injection  2-9 Units Subcutaneous 4X/DAY ACHS Cr Sánchez M.D.   2 Units at 02/02/25 2300    And    dextrose 50% (D50W) injection 25 g  25 g  Intravenous Q15 MIN PRN Cr Sánchez M.D.   25 g at 01/30/25 3673    Pharmacy Consult Request ...Pain Management Review 1 Each  1 Each Other PHARMACY TO DOSE MARK Castillo             Assessment/Plan  69 y.o. female admitted 1/22/2025 with ESRD, bilateral arm weakness    1.  ESRD /HD -dialysis after CVC placement    2.  Dialysis access: plan to place new CVC if Bcx negative    3.  Enterococcus faecalis bacteremia, endocarditis, cervical MRI concerning for cervical spine osteomyelitis.  Continue bax per ID recommendations with HD    4.  Anemia of chronic disease. Hb stable at goal    5.  Hypertension. BP well controlled      6.  Electrolytes well controlled    Recs: HD tomorrow after CVC placement if Bcx negative  Renal diet  [place] CBC, BMP  All meds to renal doses  Will follow  Discussed with Dr. Annie Esposito

## 2025-02-03 NOTE — PROGRESS NOTES
Infectious Disease Progress Note    Author: Marianna Reeves M.D. Date & Time of service: 2/3/2025  9:39 AM    Chief Complaint:  vertebral osteo  Efaecium bacteremia     Interval History:  69 y.o. diabetic female originally  admitted 2025 worsening back pain and arm pain   AF GPC E faecium Plan of care reviewed with patient   AF crying pain-had dextrose infusion that arm   AF WBC 7.2 anxious and teary c/o severe, excruciating pain in left worse than right arm today    Review of Systems:  Review of Systems   Unable to perform ROS: Medical condition       Hemodynamics:  Temp (24hrs), Av.4 °C (97.5 °F), Min:36.2 °C (97.1 °F), Max:36.6 °C (97.9 °F)  Temperature: 36.2 °C (97.1 °F)  Pulse  Av.4  Min: 54  Max: 90   Blood Pressure : (!) 146/75 (Rn notified)       Physical Exam:  Physical Exam  Cardiovascular:      Rate and Rhythm: Normal rate.   Pulmonary:      Effort: Pulmonary effort is normal.   Abdominal:      General: Abdomen is flat.   Neurological:      Comments: Sleeping          Meds:    Current Facility-Administered Medications:     MD Alert...Vancomycin per Pharmacy    atorvastatin    busPIRone    lidocaine    oxyCODONE CR    hydrOXYzine HCl    pregabalin    insulin GLARGINE    oxyCODONE immediate-release **OR** oxyCODONE immediate-release **OR** HYDROmorphone    heparin    heparin    acetaminophen    acetaminophen    torsemide    NS    methocarbamol    petrolatum    metoprolol SR    amLODIPine    levothyroxine    traZODone    venlafaxine XR    heparin    senna-docusate **AND** polyethylene glycol/lytes    insulin lispro **AND** POC blood glucose manual result **AND** NOTIFY MD and PharmD **AND** Administer 20 grams of glucose (approximately 8 ounces of fruit juice) every 15 minutes PRN FSBG less than 70 mg/dL **AND** dextrose bolus    Pharmacy Consult Request    Labs:  Recent Labs     25  0022 25  0331   WBC 8.6 7.4   RBC 3.21* 3.30*   HEMOGLOBIN 10.1* 10.4*   HEMATOCRIT  31.2* 32.4*   MCV 97.2 98.2*   MCH 31.5 31.5   RDW 58.7* 60.0*   PLATELETCT 167 179   MPV 9.8 10.3     Recent Labs     02/02/25  0022 02/03/25  0331   SODIUM 136 140   POTASSIUM 4.4 3.8   CHLORIDE 98 101   CO2 27 28   GLUCOSE 173* 60*   BUN 18 33*     Recent Labs     02/02/25  0022 02/03/25  0331   ALBUMIN 3.2 3.0*   CREATININE 2.05* 3.22*       Imaging:  IR-CVC TUNNEL W/O PORT REMOVAL    Result Date: 2/2/2025 2/1/2025 4:45 PM HISTORY/REASON FOR EXAM: Dialysis permacath removal for bacteremia, will need to be done after dialysis today 2/1. Dialysis done at 1430 TECHNIQUE/EXAM DESCRIPTION: Tunneled central venous catheter removal. COMPARISON:  None. FINDINGS:  Following a thorough discussion of the risks and benefits of the procedure, informed verbal consent was obtained. The patient was positioned supine in the hospital bed and the existing tunneled catheter skin exit site and catheter were prepped and draped in the usual sterile fashion.  A time-out was performed. Local anesthetic result was achieved with the administration of copious 1% lidocaine. Using a blunt dissection, the tunneled catheter was removed and observed to be intact. Hemostasis was gained by manual compression. An occlusive dressing was placed over the skin exit site. A hemodialysis catheter tip was then collected and placed in a sterile specimen container and sent to lab. The patient tolerated the procedure well.     Successful removal of tunneled central venous catheter as described.    MR-CERVICAL SPINE-WITH & W/O    Result Date: 1/31/2025 1/31/2025 7:57 PM HISTORY/REASON FOR EXAM:  worsening pain, osteomyelitis. Worsening arm pain. C7-T1 discitis/osteomyelitis TECHNIQUE/EXAM DESCRIPTION: MRI of the cervical spine without and with contrast. The study was performed on a [employer] 1.5 Twyla MRI scanner. T1 sagittal, T2 fast spin-echo sagittal, and gradient echo axial images were obtained of the cervical spine. Optional T2 fat-suppressed sagittal  images may also be obtained. T1 post-contrast fat suppressed sagittal images were obtained of the cervical spine. Optional T1 post-contrast axial images may be obtained. 12 mL ProHance gadolinium contrast was administered intravenously. COMPARISON: MRI cervical spine 1/27/2025, CT cervical spine 1/10/2025 FINDINGS: Some of the imaging sequences are degraded by patient motion artifact. Alignment in the cervical spine shows no segmental subluxation. Again noted are postoperative changes with interbody fusion at C4-C5, C5-C6, C6-C7 with disc space inserts. There is posterior fusion hardware at C4-C5-C6-C7 skipping the C6 level. At C7-T1, there is endplate destruction, partial vertebral body collapse of C7 and T1, diffuse marrow edema signal, and heterogeneous endplate enhancement again seen consistent with osteomyelitis and discitis. Also again seen is prevertebral soft tissue swelling/edema most prominent at C5-C6 down to T2-T3 but a thin band of prevertebral soft tissue edema extends all the way up to the skull base. There is no jennifer abscess pocket. There is no jennifer epidural abscess. There are no anomalies at the craniovertebral junction.  The cervical spinal cord is normal in caliber and signal throughout its course. There is no abnormal cord enhancement. At C2-3, no significant disc bulge or protrusion. No central stenosis. There is moderate left-sided hypertrophic facet arthropathy. The right neural foramen is intact. There is moderate left foraminal stenosis. At C3-4, there is a moderate disc/osteophyte complex favoring the left. Partial effacement of ventral subarachnoid space without jennifer central stenosis. There is marked left lateral recess stenosis and severe left foraminal stenosis due to uncinate spondylotic change. At C4-5, no central or foraminal stenosis. At C5-6, no central stenosis. Mild right and moderate left foraminal stenosis. At C6-7, no central stenosis. Probable mild bilateral foraminal  stenosis with susceptibility artifact somewhat distorting the foramina. At C7-T1, there is a moderate-sized ventral extradural defect consistent with a disc-osteophyte complex. There is some elevated enhancing dura. Mild central stenosis. No jennifer epidural abscess. Moderate bilateral foraminal stenosis.     1.  Postoperative changes as described above. 2.  C7-T1 discitis and osteomyelitis. No significant epidural phlegmon or epidural abscess pocket. 3.  C7-T1 moderate disc/osteophyte change with mild central stenosis. 4.  Multilevel foraminal stenoses as outlined above. 5.  No myelopathic cord signal.    EC-RAFIQ W/O CONT    Result Date: 2025  Transesophageal Echo Report Echocardiography Laboratory CONCLUSIONS Normal left and right ventricular systolic function. No obvious valvular vegetation seen. Linear echogenic density seen entering from superior vena cava traversing to near posterior wall of IVC. There is a heterogenous echogenic density seen attached to the posterior/inferior right atrial wall by entrance of IVC with multiple friable small linear densities seen coming off.  Does not have characteristic appearance of a thrombus, but cannot rule out thrombus vs mass vs abscess. Does not appear to be attached to the tip of the catheter although it is near the tip, and cannot rule it out.  Consider further imaging with either CT or cardiac MRI (would discuss with radiology best modality) to further define attachment and if can do tissue differentiation. Discussed the findings over the phone with Dr. Esposito. KALPANA BUTTS Exam Date:          2025                     14:01 Exam Location:      Inpatient Priority:            Routine Ordering Physician:        MIKEL MON Referring Physician:       YAA Rosario Sonographer:               Romel PENA Age:    69     Gender:    F MRN:    [id number] :    1955 BSA:    1.65   Ht (in):    66     Wt (lb):    128 Report  Type:      Complete Indications:     Acute/Subacute Bacterial Endocarditis ICD Codes:       421 CPT Codes:       84256 BP:          /          HR: Technical Quality:       Fair MEASUREMENTS  (Male / Female) Normal Values * Indicates values subject to auto-interpretation LV EF:        % Medications Viscous lidocaine Limitations None Complications No immediate complications. Proc. Components The probe was inserted and manipulated by Dr. Vanegas. Epiq probe # 4 was used for this procedure. 2D, color Doppler, and spectral Doppler  were used as part of the evaluation as clinically indicated. FINDINGS Left Ventricle Normal left ventricular systolic function. Right Ventricle Normal right ventricular systolic function. Right Atrium Normal right atrial enlargement.  Linear echogenic density seen entering from superior vena cava traversing to near posterior wall of IVC.  There is a heterogenous echogenic density seen attached to the posterior/inferior right atrial wall by entrance of IVC with multiple friable small linear densities seen coming off.  Does not have characteristic appearance of a thrombus, but cannot rule out thrombus vs mass vs abscess.  Does not appear to be attached to the tip of the catheter although it is near the tip, but cannot be completely ruled out.  Consider further imaging with either CT or cardiac MRI (would discuss with radiology best modality) to further define attachment and if can do tissue differentiation. Left Atrium Normal left atrial enlargement. LA Appendage No obvious thrombus seen. IA Septum Intact interatrial septum without interatrial shunt by color Dopplers. IV Septum Mitral Valve Mild posterior mitral annular calcification with mildy thickened leaflets with trace regurgitation. Aortic Valve Mildly sclerotic trileaflet aortic valve without significant stenosis or regurgitation. Tricuspid Valve Normal appearing tricuspid valve without significant stenosis and trace regurgitation. Pulmonic  Valve Normal pulmonic valve. Pericardium No pericardial effusion. Aorta Normal aortic root size. Effie Vanegas MD (Electronically Signed) Final Date:     2025                 18:20    US-EXTREMITY VENOUS UPPER UNILAT LEFT    Result Date: 2025   Upper Extremity  Venous Duplex Report  Vascular Laboratory  CONCLUSIONS  No superficial or deep venous thrombosis.  KALPANA BUTTS  Exam Date:     2025 16:13  Room #:     Inpatient  Priority:     Routine  Ht (in):             Wt (lb):  Ordering Physician:        CASSIUS ELDER  Referring Physician:       [id number] Foy  Sonographer:               Morgan MARCIAL  Study Type:                Complete Unilateral  Technical Quality:         Adequate  Age:    69    Gender:     F  MRN:    [id number]  :    1955      BSA:  Indications:     Pain in Limb  CPT Codes:       31374  ICD Codes:       729.5  History:         No prior study.  Limitations:  PROCEDURES:  Left upper extremity venous duplex imaging.  The following venous structures were evaluated: internal jugular,  subclavian, axillary, brachial, cephalic, and basilic veins.  Serial compression, color, and spectral Doppler flow evaluations were  performed.  FINDINGS:  Left upper extremity.  All veins demonstrate complete color filling and compressibility with  normal venous flow dynamics including spontaneous flow and respiratory  phasicity.  No superficial or deep venous thrombosis.  Flow was evaluated in the contralateral subclavian vein and normal venous  flow dynamics including spontaneous flow and respiratory phasic variation  were demonstrated.  Sang Oliva MD  (Electronically Signed)  Final Date:      2025                   17:09    EC-ECHOCARDIOGRAM COMPLETE W/O CONT    Result Date: 2025  Transthoracic Echo Report Echocardiography Laboratory CONCLUSIONS Compared to the prior study on 10/23/24, higly suspicious for native valve endocarditis. Normal left ventricular chamber  size. Normal left ventricular wall thickness. Normal left ventricular systolic function. The left ventricular ejection fraction is visually estimated to be 70%. No regional wall motion abnormalities. Normal diastolic function. Echodensity on the posterior leaflet of mitral valve suggestive of valvular vegetation Echodensity on the septal leaflet of the tricuspid valve suggestive of valvular vegetation Mild tricuspid regurgitation with estimated RV systolic pressure of 33 mmHg. BENJAMÍNKALPANA Exam Date:         2025                    11:30 Exam Location:     Inpatient Priority:          Routine Ordering Physician:        CASSIUS ELDER Referring Physician:       [id number] Foy Sonographer:               Abdon Clarke Age:    69     Gender:    F MRN:    [id number] :    1955 BSA:    1.64   Ht (in):    66     Wt (lb):    126 Exam Type:     Complete Indications:     Endocarditis, Bacteremia ICD Codes:       421 CPT Codes:       03047 BP:   142    /   58     HR:   72 Technical Quality:       Fair MEASUREMENTS  (Male / Female) Normal Values 2D ECHO LV Diastolic Diameter PLAX        4.1 cm                4.2 - 5.9 / 3.9 - 5.3 cm LV Systolic Diameter PLAX         2.2 cm                2.1 - 4.0 cm IVS Diastolic Thickness           0.9 cm                LVPW Diastolic Thickness          0.9 cm                LVOT Diameter                     1.8 cm                LV Ejection Fraction MOD BP       69.2 %                >= 55  % LV Ejection Fraction MOD 4C       68.1 %                LV Ejection Fraction MOD 2C       69.3 %                LV Ejection Fraction 4C AL        68.4 %                LV Ejection Fraction 2C AL        70.1 %                LA Volume Index                   19.9 cm3/m2           16 - 28 cm3/m2 DOPPLER AV Peak Velocity                  1.2 m/s               AV Peak Gradient                  5.7 mmHg              AV Mean Gradient                  3 mmHg                LVOT Peak Velocity                 0.9 m/s               AV Area Cont Eq vti               1.7 cm2               Mitral E Point Velocity           1.1 m/s               Mitral E to A Ratio               1.2                   MV Pressure Half Time             87 ms                 MV Area PHT                       2.5 cm2               MV Deceleration Time              297 ms                TR Peak Velocity                  208 cm/s              PV Peak Velocity                  0.74 m/s              PV Peak Gradient                  2.2 mmHg              LV E' Lateral Velocity            7.7 cm/s              Mitral E to LV E' Lateral Ratio   13.7                  LV E' Septal Velocity             7.2 cm/s              Mitral E to LV E' Septal Ratio    14.7                  * Indicates values subject to auto-interpretation LV EF:        % FINDINGS Left Ventricle Normal left ventricular chamber size. Normal left ventricular wall thickness. Normal left ventricular systolic function. The left ventricular ejection fraction is visually estimated to be 70%. No regional wall motion abnormalities. Normal diastolic function. Right Ventricle Normal right ventricular size and systolic function. Right Atrium Normal right atrial size. Normal inferior vena cava size and inspiratory collapse. Left Atrium Normal left atrial size. Left atrial volume index is 20 mL/sq m. Mitral Valve Mild mitral annular calcification. Calcification of the mitral valve leaflets. No mitral stenosis. Trace mitral regurgitation. Echodensity on the posterior leaflet suggestive of valvular vegetation. Aortic Valve Tricuspid aortic valve. The aortic valve leaflets are calcified. No aortic insufficiency. Tricuspid Valve Structurally normal tricuspid valve without significant stenosis. Mild tricuspid regurgitation. Right atrial pressure is estimated to be 3 mmHg. Right ventricular systolic pressure is estimated to be 33 mmHg. Echodensity on the septal leaflet suggestive of  valvular vegetation Pulmonic Valve The pulmonic valve is not well visualized. No stenosis or regurgitation seen. Pericardium No pericardial effusion. Aorta Normal aortic root for body surface area. The ascending aorta is not well visualized. Homero ISLAS Paramlinda  (Electronically Signed) Final Date:     29 January 2025                 15:24    MR-CERVICAL SPINE-WITH & W/O    Result Date: 1/27/2025 1/27/2025 4:19 PM HISTORY/REASON FOR EXAM:  ARM PAIN; she is a dialysis patient and will get dialysis after the scan. TECHNIQUE/EXAM DESCRIPTION: MRI of the cervical spine without and with contrast. The study was performed on a [employer] Sign[employer] 1.5 Twyla MRI scanner. T1 sagittal, T2 fast spin-echo sagittal, T1 postcontrast fat-suppressed sagittal, and gradient-echo axial images were obtained of the cervical spine. 12 mL ProHance contrast were administered  intravenously. COMPARISON:  CT dated 1/10/2025 FINDINGS:  Postoperative changes are noted with cervical fusion across C4-C7. Minimal prevertebral soft tissue edema is noted. Increased signal is noted on STIR imaging within the adjacent endplates at the C6-C7 level. Motion artifact limits evaluation. Spinal cord signal is grossly within normal limits though subtle abnormality cannot be identified due to motion artifact. Included portion of the posterior fossa is normal. Findings specific to each level are described below: C2-3: Endplate disc osteophyte complex is present causing mild canal narrowing. There is probably mild left foraminal narrowing. C3-4:  Postoperative changes noted with a well decompressed spinal canal. Neuroforamina are normal. C4-5:  Spinal canal is well decompressed. There is no significant canal narrowing. C5-6: Spinal canal is well decompressed. C6-7: Endplate discussed by complex at C6-C7 results in moderate canal narrowing with slight cord deformity. There is also moderate bilateral foraminal narrowing at this level. C7-T1: No significant abnormality. Mild  enhancement of the intervertebral disc space at C6-C7 and minimal enhancement anteriorly within the prevertebral soft tissues. Linear enhancement also noted extending inferiorly along the right paracentral aspect in the prevertebral region at the C7-T1 level. No definite abnormal epidural enhancement is seen.     1.  Postoperative changes in the cervical spine as described above. 2.  Abnormal signal is noted in the prevertebral soft tissues extending from the mid C2 to the C7-T1 level. Abnormal marrow signal is noted at the C7-T1 level with mild enhancement of the intervertebral disc space at this level. These findings are concerning for discitis-osteomyelitis. 3.  There is moderate spinal canal stenosis at C6-7.     US-RUQ    Result Date: 1/22/2025 1/22/2025 11:02 AM HISTORY/REASON FOR EXAM: Right upper quadrant abdominal pain. TECHNIQUE/EXAM DESCRIPTION: Ultrasound of the gallbladder, liver and biliary tree. COMPARISON: None FINDINGS: The liver echogenicity is heterogeneous. There is no evidence of hepatic mass. The gallbladder is surgically absent. The common bile duct is measured at 12 mm. There is some intrahepatic biliary ductal dilatation. The visualized portions of the portal vein and inferior vena cava are normal. The pancreas is normal. The right kidney is normal.     1.  Prior cholecystectomy. 2.  Common bile duct diameter measured at 12 mm with some intrahepatic biliary ductal dilatation. This may be related to presence of prior cholecystectomy. 3.  The liver is heterogeneous consistent with fatty change versus hepatocellular dysfunction.    DX-CHEST-PORTABLE (1 VIEW)    Result Date: 1/22/2025 1/22/2025 5:43 AM HISTORY/REASON FOR EXAM: Chest Pain TECHNIQUE/EXAM DESCRIPTION:  Single AP view of the chest. COMPARISON: January 6, 2025 FINDINGS: Position of medical devices appears stable. The cardiac silhouette appears within normal limits. Atherosclerotic calcification of the aorta is noted.  The central  pulmonary vasculature appears normal. Asymmetric elevation of the right hemidiaphragm is noted.  Bilateral lungs are clear. No significant pleural effusions are identified. The bony structures appear age-appropriate.     1.  No acute cardiopulmonary disease. 2.  Atherosclerosis    CT-TSPINE W/O PLUS RECONS    Result Date: 1/10/2025  1/10/2025 9:54 AM HISTORY/REASON FOR EXAM:  SYNCOPE; BACK PAIN; ARM PAIN. TECHNIQUE/EXAM DESCRIPTION AND NUMBER OF VIEWS: CT thoracic spine without contrast, with reconstructions. The study was performed on a [employer]. CT scanner. Thin-section helical scanning was performed from C7-T1 through T12-L1. Sagittal and coronal multiplanar reconstructions were generated from the axial images. Low dose optimization technique was utilized for this CT exam including automated exposure control and adjustment of the mA and/or kV according to patient size. COMPARISON: None. FINDINGS: Nomenclature: C7-T1 is axial image 26 of 254. Alignment: Moderate leftward curvature centered at T9-10. Minimal anterolisthesis of C7 on T1. Vertebrae: No acute fracture. No aggressive osseous lesions. Degenerative loss of intervertebral disc space at several levels throughout the mid thoracic spine. Endplate sclerosis at T9-10 and is unchanged. Spondylosis: No high-grade canal or foraminal stenosis. Soft tissues: Trace bilateral pleural effusions.     No acute fracture or traumatic malalignment.    CT-CSPINE WITHOUT PLUS RECONS    Result Date: 1/10/2025  1/10/2025 9:54 AM HISTORY/REASON FOR EXAM: SYNCOPE; BACK PAIN; ARM PAIN TECHNIQUE/EXAM DESCRIPTION: CT cervical spine without contrast, with reconstructions. The study was performed on a helical multidetector CT scanner. Thin-section helical scanning was performed from the skull base through T1. Sagittal and coronal multiplanar reconstructions were generated from the axial images. Low dose optimization technique was utilized for this CT exam including automated exposure  control and adjustment of the mA and/or kV according to patient size. COMPARISON:  CT 3/10/2019, MRI 1/8/2025. FINDINGS: Alignment: Grade 1 anterolisthesis of C2 on C3 and C7 on T1. Minimal retrolisthesis of C3 on C4. C1 and C2 lateral masses are congruent. Vertebrae: Prior interbody and posterior fusion of C4-C7. Hardware creates streak artifact limiting evaluation of vertebral bodies and posterior elements at the surgical levels. No evidence of lucency surrounding the hardware. There is sclerosis of the C7-T1 endplates as before, with increased destruction of the intervening disc space. No acute fracture. No aggressive osseous lesion. Spondylosis: No high-grade osseous canal or foraminal stenosis. Soft tissues: No prevertebral soft tissue swelling. Visualized lung apices are clear. Other: Visualized intracranial contents are unremarkable.     Prior interbody and posterior fusion of C4-C7. Sclerosis of the C7-T1 endplates as before, with increased destruction of the intervening disc space since 2019. Please correlate clinically for discitis-osteomyelitis.    CT-HEAD W/O    Result Date: 1/10/2025  1/10/2025 9:54 AM HISTORY/REASON FOR EXAM: R42  Dizziness TECHNIQUE/EXAM DESCRIPTION AND NUMBER OF VIEWS: CT of the head without contrast. The study was performed on a helical multidetector CT scanner. Contiguous 2.5 mm axial sections were obtained from the skull base through the vertex. Up to date radiation dose reduction adjustments have been utilized to meet ALARA standards for radiation dose reduction. COMPARISON:  4/1/2024 FINDINGS: There is no evidence of acute intracranial hemorrhage, mass, mass-effect or shift of midline structures. Gray-white matter differentiation is grossly preserved. Ventricle size and brain parenchymal volume are appropriate for this patient's stated age. The basal cisterns are patent. There are no abnormal extra-axial fluid collections. No depressed or widely  calvarial fracture is  seen. The visualized paranasal sinuses and temporal bone structures are aerated. ___________________________________     1. No acute intracranial abnormality. No evidence of acute intracranial hemorrhage or mass lesion.     MR-THORACIC SPINE-WITH & W/O    Result Date: 1/8/2025 1/8/2025 8:02 AM HISTORY/REASON FOR EXAM:  BACK PAIN BACK PAIN - Pt states for a long time she has had pain between her scapula, has been seen for it. States pain is back and worse tonight. TECHNIQUE/EXAM DESCRIPTION: MRI of the thoracic spine without and with contrast. The study was performed on a [employer] Signa 1.5 Twyla MRI scanner. T1 sagittal, T2 fast spin-echo sagittal, and T2 axial images were obtained of the thoracic spine. T1 post-contrast fat suppressed sagittal images were obtained. Optional T1 post-contrast axial images may be obtained. 10 mL ProHance contrast was administered intravenously. COMPARISON:  CT thoracic spine 12/22/2024 FINDINGS: Compensatory levocurvature of the thoracic spine noted with a rightward curvature of the lumbar spine. Spinal cord signal intensity is within normal limits. The conus is identified at the T12-L1 level. Type II marrow changes are noted in the adjacent T9 and T10 endplates. At T9-10, endplate degenerative changes noted with a posterior disc osteophyte complex mildly encroaching upon the spinal canal. There is also bilateral advanced facet degeneration at this level. These degenerative changes result in mild canal narrowing at this level. The remaining thoracic levels do not demonstrate any evidence of significant canal or foraminal stenosis. Postcontrast images through the thoracic spine do not demonstrate any abnormal enhancement.     Mild degenerative changes at T9-10. Otherwise, no significant abnormality is identified in the thoracic spine.    MR-CERVICAL SPINE-WITH & W/O    Result Date: 1/8/2025 1/8/2025 8:02 AM HISTORY/REASON FOR EXAM:  BACK PAIN TECHNIQUE/EXAM DESCRIPTION: MRI of the  cervical spine without and with contrast. The study was performed on a [employer] 1.5 Twyla MRI scanner. T1 sagittal, T2 fast spin-echo sagittal, T1 postcontrast fat-suppressed sagittal, and gradient-echo axial images were obtained of the cervical spine. 10 mL ProHance contrast were administered  intravenously. COMPARISON:  3/10/2019, MRI dated 9/1/2016. FINDINGS:  C4-C7 posterior lateral mass fusion noted. Abnormal marrow signal is noted involving the adjacent endplates at C6-C7 with increased signal on STIR imaging and low signal on T1 imaging, more marked in C7. Motion artifact significantly limits evaluation. Included portion of the posterior fossa is normal. Spinal cord signal is grossly normal. Findings specific to each level are described below: . C2-3: Normal C3-4:  Endplate disc osteophyte complex with bilateral uncovertebral degeneration. There is asymmetric mild left-sided canal narrowing due to left predominant uncovertebral degeneration. There is moderate left foraminal narrowing. C4-5: Endplate disc osteophyte complex with bilateral uncovertebral degeneration but no significant canal or foraminal stenosis. C5-6: Postoperative changes noted at this level. There is no significant canal stenosis. Neural foramina are difficult to assess due to artifact from the lateral mass screws. C6-7: Endplate disc osteophyte complex moderately encroaches upon the spinal canal causing moderate canal narrowing. There is bilateral uncovertebral degeneration with moderate right foraminal narrowing. C7-T1: Minimal endplate disc osteophyte complex without significant encroachment upon the spinal canal or neural foramina. T1-2: Normal. No definite abnormal epidural enhancement is identified in cervical spine. There is mild enhancement of the bone marrow of the adjacent endplates at C6-C7 T2-weighted images demonstrate minimal high signal in the prevertebral space at the C6-C7 level. However, there is no definite associated  abnormal enhancement in this location. Prevertebral soft tissues are within normal limits.     1.  Postoperative changes noted in the cervical spine with posterior lateral mass fusion between C4 and C7. 2.  Abnormal bone marrow signal abnormal enhancement identified at the C6-C7 level involving the adjacent endplates and the entirety of the C7 vertebral body. Slightly increased T2 signal is also noted within the disc space at this level with minimal enhancement. However, this area did demonstrate a sclerotic appearance on previous CT. Minimal abnormal signal is present also within the prevertebral soft tissues at this level. A new infectious process cannot be completely excluded. Recommend correlation with history, physical exam and consider short-term follow-up imaging. 3.  Moderate canal stenosis is noted at C6-C7.    DX-CHEST-PORTABLE (1 VIEW)    Result Date: 1/6/2025 1/6/2025 12:55 PM HISTORY/REASON FOR EXAM:  Chest Pain. TECHNIQUE/EXAM DESCRIPTION AND NUMBER OF VIEWS: Single portable view of the chest. COMPARISON: 12/31/2024 FINDINGS: Stable right IJ central venous catheter. Cardiomediastinal silhouette is stable. Aortic calcified atherosclerotic plaque. Coronary artery stent. No focal consolidation, pleural effusion, pulmonary edema or pneumothorax. No acute osseous abnormality.     No acute cardiopulmonary abnormality.      Micro:  Results       Procedure Component Value Units Date/Time    BLOOD CULTURE [[id number]]  (Abnormal)  (Susceptibility) Collected: 01/27/25 1859    Order Status: Completed Specimen: Blood from Peripheral Updated: 02/03/25 0744     Significant Indicator POS     Source BLD     Site PERIPHERAL     Culture Result Growth detected by automated blood culture system.  01/28/2025  12:13        Enterococcus faecium  If daptomycin is used for E. faecium treatment, high-dose  regimen is recommended.  The susceptibility profile for this organism indicates that  Streptomycin would not be an  effective component of  combination therapy.        Staphylococcus epidermidis    Susceptibility       Enterococcus faecium (2)       Antibiotic Interpretation Microscan   Method Status    Ampicillin Resistant >8 mcg/mL PAULINE Final    Gent Synergy Sensitive <=500 mcg/mL PAULINE Final    Vancomycin Sensitive 0.5 mcg/mL PAULINE Final              Staphylococcus epidermidis (3)       Antibiotic Interpretation Microscan   Method Status    Daptomycin Sensitive <=0.5 mcg/mL PAULINE Final    Erythromycin Resistant >4 mcg/mL PAULINE Final    Tetracycline Sensitive <=4 mcg/mL PAULINE Final    Ampicillin/sulbactam Resistant <=8/4 mcg/mL PAULINE Final    Clindamycin Resistant >4 mcg/mL PAULINE Final    Cefazolin Resistant <=8 mcg/mL PAULINE Final    Cefepime Resistant 8 mcg/mL PAULINE Final    Oxacillin Resistant >2 mcg/mL PAULINE Final    Trimeth/Sulfa Resistant >2/38 mcg/mL PAULINE Final                       CATH TIP CULTURE [[id number]] Collected: 02/01/25 1700    Order Status: Completed Specimen: Cath Tip Updated: 02/03/25 0724     Significant Indicator NEG     Source CTIP     Site HD cath tip     Culture Result No growth at 48 hours.    BLOOD CULTURE [[id number]] Collected: 01/28/25 0241    Order Status: Completed Specimen: Blood from Peripheral Updated: 02/02/25 0500     Significant Indicator NEG     Source BLD     Site PERIPHERAL     Culture Result No growth after 5 days of incubation.    CATH TIP CULTURE [[id number]]     Order Status: No result Specimen: Cath Tip from Central Line     BLOOD CULTURE [[id number]] Collected: 01/29/25 1828    Order Status: Completed Specimen: Blood from Peripheral Updated: 01/29/25 2008     Significant Indicator NEG     Source BLD     Site PERIPHERAL     Culture Result No Growth  Note: Blood cultures are incubated for 5 days and  are monitored continuously.Positive blood cultures  are called to the RN and reported as soon as  they are identified.      BLOOD CULTURE [[id number]] Collected: 01/29/25 1828    Order Status: Completed Specimen:  Blood from Peripheral Updated: 01/29/25 2008     Significant Indicator NEG     Source BLD     Site PERIPHERAL     Culture Result No Growth  Note: Blood cultures are incubated for 5 days and  are monitored continuously.Positive blood cultures  are called to the RN and reported as soon as  they are identified.              Assessment:  Active Hospital Problems    Diagnosis     *Radiculopathy affecting upper extremity [M54.10]     Right atrial mass [I51.89]     Bacteremia [R78.81]     Acute osteomyelitis of cervical spine (HCC) [M46.22]     CKD (chronic kidney disease) stage 4, GFR 15-29 ml/min ([facility]) [N18.4]     DNR (do not resuscitate) [Z66]     Type 2 diabetes mellitus, with long-term current use of insulin ([facility]) [E11.9, Z79.4]     Intractable back pain [M54.9]     ESRD needing dialysis (HCC) [N18.6, Z99.2]     CAD S/P percutaneous coronary angioplasty [I25.10, Z98.61]     Primary hypertension [I10]      Interval 24 hours:      AF, O2 RA  Labs reviewed  New Imaging personally reviewed both images and report.  Studies reviewed  Micro reviewed  Notes from primary team and specialists reviewed    Pt with sleeping and appears comfortable.    Antibiotics as below.       Assessment:  Vertebral osteomyelitis, presence of hardware, we will presume the infection is due to E faecium is 2 of 4 bottles positive.  However, patient with dialysis line and possibly infected thrombus and with hardware in her spine so there is also a risk that the Staph epidermidis is the infectious agent - will treat both   -IR and Neurosurgery declined biopsy, positive blood cx  - Prior fusion of C4-C7 w/ inserts, posterior fusion hardware C4-C7  -Repeat MRI C-spine with C7-T1 discitis and osteomyelitis. No significant epidural phlegmon or epidural abscess pocket.   Bacteremia   -Blood cult on 1/27 2:4 bottles +E faecium &  1:4 bottles +staph epi   -Blood cult on 1/28 & 1/29 -no growth and final  Possible endocarditis, thrombus, concern for infected  clot associated with her catheter line  -TTE with vegetations TV and MV  -RAFIQ equivocal   Sclerotic valves with mitral annular calcifications, but no obvious valvular vegetation.   There is a irregular echogenic density adherent to the right atrial posterior wall by IVC entry with mobile linear density off the density that can be either infections or thrombus.  Does not appear attached to the catheter tip, but cannot completely rule out.   ESRD, on dialysis 2 times weekly  -HD line removed on 2/1     PLAN:   --- Continue vancomycin with pharmacy to dose to treat the E faecium as well as the CoNS bacteremia, VOM and potentially infected thrombus while inpatient.  On discharge will continue vancomycin via dialysis and will need to be ordered by nephrology.  --- Repeat blood culture, ordered today-if any further positive blood cultures, please notify ID  --- Prior blood cultures are no growth and final, if repeat cultures negative at 24 hours okay to replace HD catheter  --- Recommend 6 weeks of IV antibiotics from date of catheter removal, end 3/15/25 -limited antibiotic options particular for the Enterococcus faecium so will likely stop antibiotics and monitor at that point if she is doing well and labs unremarkable.  If concern for ongoing infection we would then order a repeat MRI and extend the antibiotics.  --- ESR and CRP ordered for trending purposes  --- Will need at least weekly labs while on antibiotics, CBC, CMP and Vanco trough, vancomycin will need to be managed and ordered by nephrology  --- Recommend 3 times weekly dialysis while on vancomycin    Follow-up in ID clinic in 2 weeks and then prior to stopping antibiotic       Plan of care discussed with Dr Esposito and nephrology .  Will continue to follow peripherally and then if bld cx remain negative will sign off.

## 2025-02-04 PROBLEM — B95.2 BACTEREMIA DUE TO ENTEROCOCCUS: Status: ACTIVE | Noted: 2025-01-01

## 2025-02-04 LAB
ALBUMIN SERPL BCP-MCNC: 3.4 G/DL (ref 3.2–4.9)
BUN SERPL-MCNC: 43 MG/DL (ref 8–22)
CALCIUM ALBUM COR SERPL-MCNC: 8.7 MG/DL (ref 8.5–10.5)
CALCIUM SERPL-MCNC: 8.2 MG/DL (ref 8.5–10.5)
CHLORIDE SERPL-SCNC: 98 MMOL/L (ref 96–112)
CO2 SERPL-SCNC: 24 MMOL/L (ref 20–33)
CREAT SERPL-MCNC: 3.63 MG/DL (ref 0.5–1.4)
ERYTHROCYTE [DISTWIDTH] IN BLOOD BY AUTOMATED COUNT: 58.6 FL (ref 35.9–50)
GFR SERPLBLD CREATININE-BSD FMLA CKD-EPI: 13 ML/MIN/1.73 M 2
GLUCOSE BLD STRIP.AUTO-MCNC: 131 MG/DL (ref 65–99)
GLUCOSE BLD STRIP.AUTO-MCNC: 163 MG/DL (ref 65–99)
GLUCOSE BLD STRIP.AUTO-MCNC: 178 MG/DL (ref 65–99)
GLUCOSE BLD STRIP.AUTO-MCNC: 352 MG/DL (ref 65–99)
GLUCOSE SERPL-MCNC: 164 MG/DL (ref 65–99)
HCT VFR BLD AUTO: 35.2 % (ref 37–47)
HGB BLD-MCNC: 11.4 G/DL (ref 12–16)
INR PPP: 0.88 (ref 0.87–1.13)
MCH RBC QN AUTO: 31.4 PG (ref 27–33)
MCHC RBC AUTO-ENTMCNC: 32.4 G/DL (ref 32.2–35.5)
MCV RBC AUTO: 97 FL (ref 81.4–97.8)
PHOSPHATE SERPL-MCNC: 3.5 MG/DL (ref 2.5–4.5)
PLATELET # BLD AUTO: 211 K/UL (ref 164–446)
PMV BLD AUTO: 10.3 FL (ref 9–12.9)
POTASSIUM SERPL-SCNC: 3.6 MMOL/L (ref 3.6–5.5)
PROTHROMBIN TIME: 12 SEC (ref 12–14.6)
RBC # BLD AUTO: 3.63 M/UL (ref 4.2–5.4)
SODIUM SERPL-SCNC: 137 MMOL/L (ref 135–145)
WBC # BLD AUTO: 9.4 K/UL (ref 4.8–10.8)

## 2025-02-04 PROCEDURE — A9270 NON-COVERED ITEM OR SERVICE: HCPCS | Performed by: HOSPITALIST

## 2025-02-04 PROCEDURE — C1750 CATH, HEMODIALYSIS,LONG-TERM: HCPCS

## 2025-02-04 PROCEDURE — 85027 COMPLETE CBC AUTOMATED: CPT

## 2025-02-04 PROCEDURE — 700102 HCHG RX REV CODE 250 W/ 637 OVERRIDE(OP): Performed by: INTERNAL MEDICINE

## 2025-02-04 PROCEDURE — 36415 COLL VENOUS BLD VENIPUNCTURE: CPT

## 2025-02-04 PROCEDURE — 700101 HCHG RX REV CODE 250

## 2025-02-04 PROCEDURE — 97597 DBRDMT OPN WND 1ST 20 CM/<: CPT

## 2025-02-04 PROCEDURE — 700111 HCHG RX REV CODE 636 W/ 250 OVERRIDE (IP): Mod: TB | Performed by: INTERNAL MEDICINE

## 2025-02-04 PROCEDURE — 0JH63XZ INSERTION OF TUNNELED VASCULAR ACCESS DEVICE INTO CHEST SUBCUTANEOUS TISSUE AND FASCIA, PERCUTANEOUS APPROACH: ICD-10-PCS | Performed by: RADIOLOGY

## 2025-02-04 PROCEDURE — 02H633Z INSERTION OF INFUSION DEVICE INTO RIGHT ATRIUM, PERCUTANEOUS APPROACH: ICD-10-PCS | Performed by: RADIOLOGY

## 2025-02-04 PROCEDURE — 700102 HCHG RX REV CODE 250 W/ 637 OVERRIDE(OP): Performed by: HOSPITALIST

## 2025-02-04 PROCEDURE — 90832 PSYTX W PT 30 MINUTES: CPT | Performed by: SOCIAL WORKER

## 2025-02-04 PROCEDURE — 99232 SBSQ HOSP IP/OBS MODERATE 35: CPT | Performed by: HOSPITALIST

## 2025-02-04 PROCEDURE — 700111 HCHG RX REV CODE 636 W/ 250 OVERRIDE (IP): Mod: JZ | Performed by: RADIOLOGY

## 2025-02-04 PROCEDURE — 700102 HCHG RX REV CODE 250 W/ 637 OVERRIDE(OP): Performed by: NEUROLOGICAL SURGERY

## 2025-02-04 PROCEDURE — 85610 PROTHROMBIN TIME: CPT

## 2025-02-04 PROCEDURE — A9270 NON-COVERED ITEM OR SERVICE: HCPCS | Performed by: INTERNAL MEDICINE

## 2025-02-04 PROCEDURE — 99232 SBSQ HOSP IP/OBS MODERATE 35: CPT | Performed by: INTERNAL MEDICINE

## 2025-02-04 PROCEDURE — 700111 HCHG RX REV CODE 636 W/ 250 OVERRIDE (IP)

## 2025-02-04 PROCEDURE — 82962 GLUCOSE BLOOD TEST: CPT | Mod: 91

## 2025-02-04 PROCEDURE — 80069 RENAL FUNCTION PANEL: CPT

## 2025-02-04 PROCEDURE — A9270 NON-COVERED ITEM OR SERVICE: HCPCS | Performed by: NEUROLOGICAL SURGERY

## 2025-02-04 PROCEDURE — 770006 HCHG ROOM/CARE - MED/SURG/GYN SEMI*

## 2025-02-04 PROCEDURE — 700105 HCHG RX REV CODE 258: Performed by: RADIOLOGY

## 2025-02-04 RX ORDER — HEPARIN SODIUM 1000 [USP'U]/ML
INJECTION, SOLUTION INTRAVENOUS; SUBCUTANEOUS
Status: COMPLETED
Start: 2025-02-04 | End: 2025-02-04

## 2025-02-04 RX ORDER — LIDOCAINE HYDROCHLORIDE AND EPINEPHRINE 10; 10 MG/ML; UG/ML
INJECTION, SOLUTION INFILTRATION; PERINEURAL
Status: COMPLETED
Start: 2025-02-04 | End: 2025-02-04

## 2025-02-04 RX ORDER — BUSPIRONE HYDROCHLORIDE 5 MG/1
5 TABLET ORAL 2 TIMES DAILY
Qty: 60 TABLET | Refills: 2 | Status: SHIPPED | OUTPATIENT
Start: 2025-02-05 | End: 2025-02-12

## 2025-02-04 RX ORDER — METOPROLOL SUCCINATE 50 MG/1
50 TABLET, EXTENDED RELEASE ORAL EVERY EVENING
Qty: 90 TABLET | Refills: 2 | Status: SHIPPED | OUTPATIENT
Start: 2025-02-05 | End: 2025-02-12

## 2025-02-04 RX ORDER — MIDAZOLAM HYDROCHLORIDE 1 MG/ML
.5-2 INJECTION INTRAMUSCULAR; INTRAVENOUS PRN
Status: ACTIVE | OUTPATIENT
Start: 2025-02-04 | End: 2025-02-04

## 2025-02-04 RX ORDER — ONDANSETRON 2 MG/ML
4 INJECTION INTRAMUSCULAR; INTRAVENOUS PRN
Status: ACTIVE | OUTPATIENT
Start: 2025-02-04 | End: 2025-02-04

## 2025-02-04 RX ORDER — MIDAZOLAM HYDROCHLORIDE 1 MG/ML
INJECTION INTRAMUSCULAR; INTRAVENOUS
Status: COMPLETED
Start: 2025-02-04 | End: 2025-02-04

## 2025-02-04 RX ORDER — MIDAZOLAM HYDROCHLORIDE 1 MG/ML
INJECTION INTRAMUSCULAR; INTRAVENOUS
Status: DISPENSED
Start: 2025-02-04 | End: 2025-02-05

## 2025-02-04 RX ORDER — OXYCODONE HYDROCHLORIDE 10 MG/1
10 TABLET ORAL EVERY 4 HOURS PRN
Qty: 30 TABLET | Refills: 0 | Status: SHIPPED | OUTPATIENT
Start: 2025-02-04 | End: 2025-02-09

## 2025-02-04 RX ORDER — TORSEMIDE 100 MG/1
100 TABLET ORAL DAILY
Qty: 30 TABLET | Refills: 3 | Status: SHIPPED | OUTPATIENT
Start: 2025-02-05 | End: 2025-02-12

## 2025-02-04 RX ORDER — CEFAZOLIN SODIUM 1 G/3ML
INJECTION, POWDER, FOR SOLUTION INTRAMUSCULAR; INTRAVENOUS
Status: COMPLETED
Start: 2025-02-04 | End: 2025-02-04

## 2025-02-04 RX ORDER — SODIUM CHLORIDE 9 MG/ML
500 INJECTION, SOLUTION INTRAVENOUS
Status: ACTIVE | OUTPATIENT
Start: 2025-02-04 | End: 2025-02-04

## 2025-02-04 RX ORDER — METHOCARBAMOL 750 MG/1
750 TABLET, FILM COATED ORAL 3 TIMES DAILY
Qty: 120 TABLET | Refills: 1 | Status: SHIPPED | OUTPATIENT
Start: 2025-02-05 | End: 2025-02-12

## 2025-02-04 RX ADMIN — PREGABALIN 25 MG: 25 CAPSULE ORAL at 17:17

## 2025-02-04 RX ADMIN — LIDOCAINE HYDROCHLORIDE AND EPINEPHRINE: 10; 10 INJECTION, SOLUTION INFILTRATION; PERINEURAL at 12:35

## 2025-02-04 RX ADMIN — CEFAZOLIN 2 G: 1 INJECTION, POWDER, FOR SOLUTION INTRAMUSCULAR; INTRAVENOUS at 13:07

## 2025-02-04 RX ADMIN — OXYCODONE HYDROCHLORIDE 10 MG: 10 TABLET, FILM COATED, EXTENDED RELEASE ORAL at 22:49

## 2025-02-04 RX ADMIN — TRAZODONE HYDROCHLORIDE 150 MG: 50 TABLET ORAL at 21:49

## 2025-02-04 RX ADMIN — OXYCODONE HYDROCHLORIDE 15 MG: 15 TABLET ORAL at 07:59

## 2025-02-04 RX ADMIN — ACETAMINOPHEN 500 MG: 500 TABLET ORAL at 12:12

## 2025-02-04 RX ADMIN — INSULIN LISPRO 8 UNITS: 100 INJECTION, SOLUTION INTRAVENOUS; SUBCUTANEOUS at 17:42

## 2025-02-04 RX ADMIN — MIDAZOLAM HYDROCHLORIDE 2 MG: 1 INJECTION INTRAMUSCULAR; INTRAVENOUS at 13:28

## 2025-02-04 RX ADMIN — HEPARIN SODIUM 3.6 UNITS: 1000 INJECTION, SOLUTION INTRAVENOUS; SUBCUTANEOUS at 12:30

## 2025-02-04 RX ADMIN — OXYCODONE HYDROCHLORIDE 15 MG: 15 TABLET ORAL at 17:47

## 2025-02-04 RX ADMIN — OXYCODONE HYDROCHLORIDE 15 MG: 15 TABLET ORAL at 04:08

## 2025-02-04 RX ADMIN — BUSPIRONE HYDROCHLORIDE 5 MG: 10 TABLET ORAL at 04:55

## 2025-02-04 RX ADMIN — ACETAMINOPHEN 500 MG: 500 TABLET ORAL at 17:17

## 2025-02-04 RX ADMIN — INSULIN GLARGINE-YFGN 8 UNITS: 100 INJECTION, SOLUTION SUBCUTANEOUS at 22:49

## 2025-02-04 RX ADMIN — ACETAMINOPHEN 500 MG: 500 TABLET ORAL at 04:07

## 2025-02-04 RX ADMIN — VENLAFAXINE HYDROCHLORIDE 150 MG: 75 CAPSULE, EXTENDED RELEASE ORAL at 04:55

## 2025-02-04 RX ADMIN — ACETAMINOPHEN 500 MG: 500 TABLET ORAL at 23:30

## 2025-02-04 RX ADMIN — FENTANYL CITRATE 50 MCG: 50 INJECTION, SOLUTION INTRAMUSCULAR; INTRAVENOUS at 13:31

## 2025-02-04 RX ADMIN — METHOCARBAMOL 750 MG: 750 TABLET ORAL at 12:12

## 2025-02-04 RX ADMIN — PETROLATUM: 420 OINTMENT TOPICAL at 05:34

## 2025-02-04 RX ADMIN — LEVOTHYROXINE SODIUM 250 MCG: 0.12 TABLET ORAL at 04:55

## 2025-02-04 RX ADMIN — FENTANYL CITRATE 50 MCG: 50 INJECTION, SOLUTION INTRAMUSCULAR; INTRAVENOUS at 13:28

## 2025-02-04 RX ADMIN — PETROLATUM: 420 OINTMENT TOPICAL at 17:51

## 2025-02-04 RX ADMIN — METOPROLOL SUCCINATE 50 MG: 50 TABLET, EXTENDED RELEASE ORAL at 17:17

## 2025-02-04 RX ADMIN — OXYCODONE HYDROCHLORIDE 10 MG: 10 TABLET, FILM COATED, EXTENDED RELEASE ORAL at 12:12

## 2025-02-04 RX ADMIN — SENNOSIDES AND DOCUSATE SODIUM 2 TABLET: 50; 8.6 TABLET ORAL at 17:17

## 2025-02-04 RX ADMIN — ATORVASTATIN CALCIUM 40 MG: 40 TABLET, FILM COATED ORAL at 17:17

## 2025-02-04 RX ADMIN — HYDROMORPHONE HYDROCHLORIDE 0.5 MG: 1 INJECTION, SOLUTION INTRAMUSCULAR; INTRAVENOUS; SUBCUTANEOUS at 18:50

## 2025-02-04 RX ADMIN — HYDROMORPHONE HYDROCHLORIDE 0.5 MG: 1 INJECTION, SOLUTION INTRAMUSCULAR; INTRAVENOUS; SUBCUTANEOUS at 06:30

## 2025-02-04 RX ADMIN — FENTANYL CITRATE 12.5 MCG: 50 INJECTION, SOLUTION INTRAMUSCULAR; INTRAVENOUS at 13:37

## 2025-02-04 RX ADMIN — METHOCARBAMOL 750 MG: 750 TABLET ORAL at 17:17

## 2025-02-04 RX ADMIN — FENTANYL CITRATE 12.5 MCG: 50 INJECTION, SOLUTION INTRAMUSCULAR; INTRAVENOUS at 13:44

## 2025-02-04 RX ADMIN — OXYCODONE HYDROCHLORIDE 15 MG: 15 TABLET ORAL at 14:45

## 2025-02-04 RX ADMIN — AMLODIPINE BESYLATE 10 MG: 10 TABLET ORAL at 04:55

## 2025-02-04 RX ADMIN — TORSEMIDE 100 MG: 100 TABLET ORAL at 04:55

## 2025-02-04 RX ADMIN — INSULIN LISPRO 2 UNITS: 100 INJECTION, SOLUTION INTRAVENOUS; SUBCUTANEOUS at 22:52

## 2025-02-04 RX ADMIN — BUSPIRONE HYDROCHLORIDE 5 MG: 10 TABLET ORAL at 17:21

## 2025-02-04 RX ADMIN — METHOCARBAMOL 750 MG: 750 TABLET ORAL at 04:55

## 2025-02-04 RX ADMIN — OXYCODONE HYDROCHLORIDE 15 MG: 15 TABLET ORAL at 21:50

## 2025-02-04 RX ADMIN — MIDAZOLAM HYDROCHLORIDE 2 MG: 1 INJECTION, SOLUTION INTRAMUSCULAR; INTRAVENOUS at 13:28

## 2025-02-04 ASSESSMENT — PAIN DESCRIPTION - PAIN TYPE
TYPE: ACUTE PAIN
TYPE: ACUTE PAIN;CHRONIC PAIN
TYPE: ACUTE PAIN
TYPE: ACUTE PAIN;CHRONIC PAIN

## 2025-02-04 ASSESSMENT — ENCOUNTER SYMPTOMS
SHORTNESS OF BREATH: 0
HEADACHES: 0
NERVOUS/ANXIOUS: 1
BACK PAIN: 1
ABDOMINAL PAIN: 0
NECK PAIN: 1
NAUSEA: 0
FEVER: 0

## 2025-02-04 ASSESSMENT — FIBROSIS 4 INDEX: FIB4 SCORE: 4.3

## 2025-02-04 NOTE — PROGRESS NOTES
Reason for Follow-Up:  Monthly outreach call.  Patient is currently In-Patient at [facility], with discharge planned to a Skilled Nursing Facility. Will follow up with patient as scheduled in March 2025 and complete Care Plan at that time after talking with patient.          Next Outreach: March 2025

## 2025-02-04 NOTE — PROGRESS NOTES
Nephrology Daily Progress Note    Date of Service  2/4/2025    Chief Complaint  69 y.o. female admitted 1/22/2025 with ESRD, bilateral arm weakness, found to have endocarditis and Enterococcus faecalis bacteremia     Interval Problem Update  Patient is complaining of severe headache and neck pain  1/24 patient feels better today   unfortunately 24-hour urine was not being collected  We will start 24-hour urine study for creatinine clearance today  1/26 patient is complaining of neuropathy pain in her arms  1/27 -still complains of pain, mostly in her left arm.  Denies chest pain, shortness of breath  1/29 -patient had dialysis yesterday with 0.5 L removed.  Found to have Enterococcus bacteremia.  Denies chest pain, shortness of breath, complains of ongoing neck and left arm pain.  1/30-left-sided arm pain improved, patient now has right shoulder pain.  Denies chest pain, shortness of breath.  Still urinating.  2/3 -doing better, no acute events, no new complaints  Dialysis catheter removed - if BCx negative plan to place new CVC tomorrow  2/4 -no acute events, sleeping  Bcx negative  Plan to place TDC  Dialysis chair on MWF  Vanc with HD  Review of Systems  Review of Systems   Unable to perform ROS: Other    sleeping    Physical Exam  Temp:  [36 °C (96.8 °F)-36.4 °C (97.5 °F)] 36 °C (96.8 °F)  Pulse:  [69-82] 69  Resp:  [16-19] 18  BP: (124-160)/(64-87) 139/70  SpO2:  [94 %-100 %] 94 %    Physical Exam  Constitutional:       General: She is sleeping.      Appearance: Normal appearance.   HENT:      Head: Normocephalic and atraumatic.   Cardiovascular:      Rate and Rhythm: Normal rate.   Pulmonary:      Effort: Pulmonary effort is normal. No respiratory distress.   Musculoskeletal:      Right lower leg: No edema.      Left lower leg: No edema.   Neurological:      Comments: sleeping     Dialysis access: placement pending    Fluids    Intake/Output Summary (Last 24 hours) at 2/4/2025 1142  Last data filed at 2/4/2025  1000  Gross per 24 hour   Intake 0 ml   Output 450 ml   Net -450 ml         Laboratory  Labs reviewed, pertinent labs below.  Recent Labs     02/02/25  0022 02/03/25  0331 02/04/25  0129   WBC 8.6 7.4 9.4   RBC 3.21* 3.30* 3.63*   HEMOGLOBIN 10.1* 10.4* 11.4*   HEMATOCRIT 31.2* 32.4* 35.2*   MCV 97.2 98.2* 97.0   MCH 31.5 31.5 31.4   MCHC 32.4 32.1* 32.4   RDW 58.7* 60.0* 58.6*   PLATELETCT 167 179 211   MPV 9.8 10.3 10.3       Recent Labs     02/02/25 0022 02/03/25 0331 02/04/25  0129   SODIUM 136 140 137   POTASSIUM 4.4 3.8 3.6   CHLORIDE 98 101 98   CO2 27 28 24   GLUCOSE 173* 60* 164*   BUN 18 33* 43*   CREATININE 2.05* 3.22* 3.63*   CALCIUM 8.2* 7.8* 8.2*     Recent Labs     02/04/25  0129   INR 0.88           URINALYSIS:  Lab Results   Component Value Date/Time    COLORURINE DK Yellow 02/28/2024 0544    CLARITY Clear 02/28/2024 0544    SPECGRAVITY 1.021 02/28/2024 0544    PHURINE 5.5 02/28/2024 0544    KETONES Negative 02/28/2024 0544    PROTEINURIN 300 (A) 02/28/2024 0544    BILIRUBINUR Small (A) 02/28/2024 0544    UROBILU 1.0 02/28/2024 0544    NITRITE Negative 02/28/2024 0544    LEUKESTERAS Negative 02/28/2024 0544    OCCULTBLOOD Trace (A) 02/28/2024 0544     UPC  Lab Results   Component Value Date/Time    TOTPROTUR 557.0 (H) 02/28/2024 0544      Lab Results   Component Value Date/Time    CREATININEU 82.41 02/28/2024 0544         Imaging interpreted by radiologist. Imaging reports reviewed with pertinent findings below  IR-CVC TUNNEL W/O PORT REMOVAL   Final Result      Successful removal of tunneled central venous catheter as described.      MR-CERVICAL SPINE-WITH & W/O   Final Result      1.  Postoperative changes as described above.   2.  C7-T1 discitis and osteomyelitis. No significant epidural phlegmon or epidural abscess pocket.   3.  C7-T1 moderate disc/osteophyte change with mild central stenosis.   4.  Multilevel foraminal stenoses as outlined above.   5.  No myelopathic cord signal.      EC-RAFIQ  W/O CONT   Final Result      US-EXTREMITY VENOUS UPPER UNILAT LEFT   Final Result      EC-ECHOCARDIOGRAM COMPLETE W/O CONT   Final Result      MR-CERVICAL SPINE-WITH & W/O   Final Result         1.  Postoperative changes in the cervical spine as described above.      2.  Abnormal signal is noted in the prevertebral soft tissues extending from the mid C2 to the C7-T1 level. Abnormal marrow signal is noted at the C7-T1 level with mild enhancement of the intervertebral disc space at this level. These findings are    concerning for discitis-osteomyelitis.      3.  There is moderate spinal canal stenosis at C6-7.         US-RUQ   Final Result      1.  Prior cholecystectomy.      2.  Common bile duct diameter measured at 12 mm with some intrahepatic biliary ductal dilatation. This may be related to presence of prior cholecystectomy.      3.  The liver is heterogeneous consistent with fatty change versus hepatocellular dysfunction.      DX-CHEST-PORTABLE (1 VIEW)   Final Result         1.  No acute cardiopulmonary disease.   2.  Atherosclerosis      IR-CONSULT AND TREAT    (Results Pending)         Current Facility-Administered Medications   Medication Dose Route Frequency Provider Last Rate Last Admin    MD Alert...Vancomycin per Pharmacy   Other PHARMACY TO DOSE Annie Esposito D.O.        atorvastatin (Lipitor) tablet 40 mg  40 mg Oral Q EVENING Annie Esposito D.O.   40 mg at 02/03/25 1826    busPIRone (Buspar) tablet 5 mg  5 mg Oral BID Annie Esposito, D.O.   5 mg at 02/04/25 0455    lidocaine (Asperflex) 4 % patch 3 Patch  3 Patch Transdermal Q24HR Annie Esposito D.O.   3 Patch at 02/02/25 1407    oxyCODONE CR (OxyCONTIN) tablet 10 mg  10 mg Oral Q12HRS Annie Esposito, D.O.   10 mg at 02/03/25 2357    hydrOXYzine HCl (Atarax) tablet 25 mg  25 mg Oral Q6HRS PRN MIRANDA Foster.OChey   25 mg at 02/02/25 2021    pregabalin (Lyrica) capsule 25 mg  25 mg Oral Q EVENING Annie Esposito D.O.   25 mg at  02/03/25 1954    insulin GLARGINE (Lantus,Semglee) injection  8 Units Subcutaneous Q EVENING Annie Esposito D.O.   8 Units at 02/03/25 1955    oxyCODONE immediate release (Roxicodone) tablet 10 mg  10 mg Oral Q3HRS PRN Annie Esposito D.O.   10 mg at 02/03/25 1952    Or    oxycodone (Oxy-IR) immediate release tablet 15 mg  15 mg Oral Q3HRS PRN Annie Esposito, D.O.   15 mg at 02/04/25 0759    Or    HYDROmorphone (Dilaudid) injection 0.5 mg  0.5 mg Intravenous Q3HRS PRN Annie Esposito, D.O.   0.5 mg at 02/04/25 0630    heparin intracatheter (for DIALYSIS USE ONLY) 1,800 Units  1,800 Units Intracatheter DIALYSIS PRN Ankit Diggs M.D.   1,800 Units at 02/01/25 1430    heparin intracatheter (for DIALYSIS USE ONLY) 1,800 Units  1,800 Units Intracatheter DIALYSIS PRN Ankit Diggs M.D.   1,800 Units at 02/01/25 1430    acetaminophen (Tylenol) tablet 500 mg  500 mg Oral Q6HRS Annie Esposito, D.O.   500 mg at 02/03/25 1827    acetaminophen (Tylenol) tablet 500 mg  500 mg Oral Q6HRS PRN Annie Esposito, D.O.   500 mg at 02/04/25 0407    torsemide (Demadex) tablet 100 mg  100 mg Oral Q DAY Ankit Diggs M.D.   100 mg at 02/04/25 0455    NS (Bolus) 0.9 % infusion 100 mL  100 mL Intravenous DIALYSIS PRN Ankit Diggs M.D.        methocarbamol (Robaxin) tablet 750 mg  750 mg Oral TID Tomi Rose, D.O.   750 mg at 02/04/25 0455    petrolatum 42 % ointment   Topical BID Dalton Fowler M.D.   Given at 02/04/25 0534    metoprolol SR (Toprol XL) tablet 50 mg  50 mg Oral Q EVENING Jigar Cabrales M.D.   50 mg at 02/03/25 1826    amLODIPine (Norvasc) tablet 10 mg  10 mg Oral DAILY Cr Sánchez M.D.   10 mg at 02/04/25 0455    levothyroxine (Synthroid) tablet 250 mcg  250 mcg Oral AM ES Cr Sánchez M.D.   250 mcg at 02/04/25 0455    traZODone (Desyrel) tablet 150 mg  150 mg Oral QHS Cr Sánchez M.D.   150 mg at 02/03/25 2202    venlafaxine XR (Effexor XR) capsule 150 mg  150 mg Oral DAILY Cr Sánchez M.D.   150 mg at 02/04/25  0455    [Held by provider] heparin injection 5,000 Units  5,000 Units Subcutaneous Q8HRS Annie Esposito D.O.   5,000 Units at 02/02/25 2021    senna-docusate (Pericolace Or Senokot S) 8.6-50 MG per tablet 2 Tablet  2 Tablet Oral Q EVENING Cr Sánchez M.D.   2 Tablet at 02/03/25 1953    And    polyethylene glycol/lytes (Miralax) Packet 1 Packet  1 Packet Oral QDAY PRN Cr Sánchez M.D.   1 Packet at 01/24/25 0512    insulin lispro (HumaLOG,AdmeLOG) subcutaneous injection  2-9 Units Subcutaneous 4X/DAY ACHS Cr Sánchez M.D.   2 Units at 02/03/25 2357    And    dextrose 50% (D50W) injection 25 g  25 g Intravenous Q15 MIN PRN Cr Sánchez M.D.   25 g at 01/30/25 0934    Pharmacy Consult Request ...Pain Management Review 1 Each  1 Each Other PHARMACY TO DOSE MARK Castillo             Assessment/Plan  69 y.o. female admitted 1/22/2025 with ESRD, bilateral arm weakness    1.  ESRD /HD -dialysis after CVC placement    2.  Dialysis access: plan to place new CVC today -Bcx negative    3.  Enterococcus faecalis bacteremia, endocarditis, cervical MRI concerning for cervical spine osteomyelitis.  Continue bax per ID recommendations with HD    4.  Anemia of chronic disease. Hb stable at goal    5.  Hypertension. BP well controlled      6.  Electrolytes well controlled    Recs: HD after CVC placement   Renal diet  Daiy CBC, BMP  All meds to renal doses  Will follow

## 2025-02-04 NOTE — PROGRESS NOTES
Received report from Estelle RN and assumed care of patient at change of shift. Patient is A&Ox4, on RA, and reports 7/10 upper back at this time. Medication administered per MAR. Patient assessment completed, bed in lowest position, and call light and personal belongings are within reach. Patient expressed no further needs at this time.

## 2025-02-04 NOTE — DISCHARGE PLANNING
ATTN: Case Management  RE: Referral for Home Health    As of 2/4, we have accepted the Home Health referral for the patient listed above.    A [facility]  will contact the patient within 48 hours. If you have any questions or concerns regarding the patient’s transition to Home Health, please do not hesitate to contact us at [phone].      We look forward to collaborating with you,  [facility]

## 2025-02-04 NOTE — PROGRESS NOTES
Size medium offloading shoe fit to patient’s LLE with pegs removed surrounding the wound site at the great toe as requested.    Contact traction with any questions or concerns regarding the use of this DME.

## 2025-02-04 NOTE — DISCHARGE PLANNING
"TCN following. HTH/SCP chart reviewed. No new TCN needs identified. Please see prior TCN note from 2/3/25 for most recent discharge planning considerations if indicated.  Current discharge considerations are for home with HH ([facility]  has accepted) and close outpatient f/u when medically cleared.   Patient owns a FWW, 4WW and SPC.  Per TCN note on 2/3, Per ID note on 2/3/25, \"On discharge will continue vancomycin via dialysis and will need to be ordered by nephrology.  Recommend 6 weeks of IV antibiotics from date of catheter removal, end 3/15/25\".      Per collaboration with BOBBY and BOBBY Leahy note on 2/4/25 at 9:52 AM, \"patient is anticipated to be MC to discharge today, pending line placement for dialysis; dialysis frequency has increased from 2x/week to 3x/week\".  Please see Psychiatry note on 2/3/25 at 4:23 PM.      Per review, noted current 6 click scores 23 ADL's and 24 mobility and per kardex mobility documented at 150 feet X 1 no AD.      Completed:  [facility] accepted.   PT recommends home health - 1/29  OT no needs on 1/31.   Choice obtained:  (1. [facility] 2. [facility])  Pt aware of [facility]'s blanket referral policy  Noted [facility] hospice has declined patient  SCP with Non-[facility] PCP.  "

## 2025-02-04 NOTE — CONSULTS
PSYCHIATRIC CONSULTATION:  Reason for admission: Intractable pain    Reason for consult: Anxious and overwhelmed with current medical status   Requesting Physician: Annie Esposito DO  Supervising Physician:Bri Velazquez  Written Collaboration: Amanda Sol MS3     Legal status: Not applicable        Interval History:  Ms. Ahsa strong was seen at bedside this morning. Patient was wearing green sweat pants and a hoodie laying in bed. Patient was calm and cooperative. Patient was alert and oriented to place, person, time, and situation. Patient states she is feeling a lot better, her mood is good and improved from arrival to hospital. Her sleep has been good, she states the last two nights she has slept really good. Her energy has improved and she attributes this to feeling a lot better. She states she is going to walk around the unit with a nurse assistant later. Her appetite is good although some of the food is not great here. She denies SI, HI, delusions, no auditory or visual hallucinations. Patient states overall she feels so much better and believes her current medication regimen and mindfulness exercises are helpful.     Reviewed nursing noted and spoke with nursing staff. The patient is compliant with medication. The patient has not been aggressive or agitated. The patient is eating % of breakfast, % of lunch and % of dinner as of flow sheet yesterday. The patient has required psychiatric PRN medications in the last 24 hours for anxiety. Patient states she was really anxious because she could not sleep due to her roommate, she asked for her Hydroxyzine for her anxiety and that helped her. Patient states she tried the mindfulness exercises that I provided for her after she received the hydroxyzine for anxiety and they helped.       Medical Review of Systems: as reported by pt. All systems reviewed. Only those found to be + are noted below. All others are negative.   Cardiovascular:  "Denies chest pain  Respiratory: Denies shortness of breath  Gastrointestinal: Denies any abdominal pain, nausea, vomiting, diarrhea  Neurology: alert and oriented x 4, no tremors or headaches      Psychiatric (Physical) Examination: observed phenomenon:  Vitals:    02/03/25 1547   BP: (!) 148/64   Pulse: 82   Resp: 16   Temp: 36.4 °C (97.5 °F)   SpO2: 99%      General: Awake, alert in no acute distress  Patient Appearance: Appropriate, fairly groomed, wearing hospital gown   Behavior: Appropriate Behavior, Calm, Cooperative  Speech.: Spontaneous, Normal rate and rhythm, Answers appropriately, normal volume  Mood Comments: \"I feel a lot better\"   Affect: euthymic, pleasant  Thought Content: Appropriate, no suicidal ideation, no thoughts of self harm, no homicidal ideation, contracts for safety, feels life is worth living  Thought Process: Logical and goal directed, no loosening of associations  Psychosis: no delusions, no hallucinations  Insight: Good insight  Judgment: Good judgment  Cognition: Memory appeared intact in interview, concentration is intact for age and education and fully oriented to person, place, time and circumstance.  Language: Is able to repeat phrases. and Is able to name objects.  Fund of Knowledge: AS expected for age and level of education  Neuro: no tics, tremors, dyskinesias. no dystonias. Gait appears steady.     Allergies:   Allergies   Allergen Reactions    Tape Unspecified and Rash     Blisters, paper tape is ok  Other reaction(s): Rash       Medications (currently prescribed at [facility]):    Current Facility-Administered Medications:     vancomycin (Vancocin) 1,000 mg in  mL IVPB, 15 mg/kg, Intravenous, Once, Annie Esposito D.O.    MD Alert...Vancomycin per Pharmacy, , Other, PHARMACY TO DOSE, Annie Esposito D.O.    atorvastatin (Lipitor) tablet 40 mg, 40 mg, Oral, Q EVENING, Annie Esposito D.O., 40 mg at 02/02/25 1700    busPIRone (Buspar) tablet 5 mg, 5 mg, Oral, BID, " RODRICK ChoudhuryO., 5 mg at 02/03/25 0500    lidocaine (Asperflex) 4 % patch 3 Patch, 3 Patch, Transdermal, Q24HR, Annie Esposito D.O., 3 Patch at 02/02/25 1407    oxyCODONE CR (OxyCONTIN) tablet 10 mg, 10 mg, Oral, Q12HRS, RODRICK ChoudhuryO., 10 mg at 02/03/25 1137    hydrOXYzine HCl (Atarax) tablet 25 mg, 25 mg, Oral, Q6HRS PRN, MIRANDA Foster.OChey, 25 mg at 02/02/25 2021    pregabalin (Lyrica) capsule 25 mg, 25 mg, Oral, Q EVENING, RODRICK ChoudhuryO., 25 mg at 02/02/25 1659    insulin GLARGINE (Lantus,Semglee) injection, 8 Units, Subcutaneous, Q EVENING, RODRICK ChoudhuryO., 8 Units at 02/02/25 1715    oxyCODONE immediate release (Roxicodone) tablet 10 mg, 10 mg, Oral, Q3HRS PRN, 10 mg at 02/03/25 1001 **OR** oxycodone (Oxy-IR) immediate release tablet 15 mg, 15 mg, Oral, Q3HRS PRN, 15 mg at 02/02/25 1610 **OR** HYDROmorphone (Dilaudid) injection 0.5 mg, 0.5 mg, Intravenous, Q3HRS PRN, RODRICK ChoudhuryO., 0.5 mg at 02/01/25 1949    heparin intracatheter (for DIALYSIS USE ONLY) 1,800 Units, 1,800 Units, Intracatheter, DIALYSIS PRN, Ankit Diggs M.D., 1,800 Units at 02/01/25 1430    heparin intracatheter (for DIALYSIS USE ONLY) 1,800 Units, 1,800 Units, Intracatheter, DIALYSIS PRN, Ankit Diggs M.D., 1,800 Units at 02/01/25 1430    acetaminophen (Tylenol) tablet 500 mg, 500 mg, Oral, Q6HRS, RODRICK ChoudhuryOChey, 500 mg at 02/03/25 1118    acetaminophen (Tylenol) tablet 500 mg, 500 mg, Oral, Q6HRS PRN, Annie Esposito D.O.    torsemide (Demadex) tablet 100 mg, 100 mg, Oral, Q DAY, Ankit Diggs M.D., 100 mg at 02/03/25 0652    NS (Bolus) 0.9 % infusion 100 mL, 100 mL, Intravenous, DIALYSIS PRN, Ankit Diggs M.D.    methocarbamol (Robaxin) tablet 750 mg, 750 mg, Oral, TID, Tomi Rose D.OChey, 750 mg at 02/03/25 1119    petrolatum 42 % ointment, , Topical, BID, Dalton Fowler M.D., Given at 02/03/25 0501    metoprolol SR (Toprol XL) tablet 50 mg, 50 mg, Oral, Q EVENING, Jigar Cabrales M.D., 50  mg at 02/02/25 1659    amLODIPine (Norvasc) tablet 10 mg, 10 mg, Oral, DAILY, Cr Sánchez M.D., 10 mg at 02/03/25 0500    levothyroxine (Synthroid) tablet 250 mcg, 250 mcg, Oral, AM ES, Cr Sánchez M.D., 250 mcg at 02/03/25 0501    traZODone (Desyrel) tablet 150 mg, 150 mg, Oral, QHS, Cr Sánchez M.D., 150 mg at 02/02/25 2021    venlafaxine XR (Effexor XR) capsule 150 mg, 150 mg, Oral, DAILY, Cr Sánchez M.D., 150 mg at 02/03/25 0500    [Held by provider] heparin injection 5,000 Units, 5,000 Units, Subcutaneous, Q8HRS, Annie Esposito D.O., 5,000 Units at 02/02/25 2021    senna-docusate (Pericolace Or Senokot S) 8.6-50 MG per tablet 2 Tablet, 2 Tablet, Oral, Q EVENING, 2 Tablet at 02/02/25 1700 **AND** polyethylene glycol/lytes (Miralax) Packet 1 Packet, 1 Packet, Oral, QDAY PRN, Cr Sánchez M.D., 1 Packet at 01/24/25 0512    insulin lispro (HumaLOG,AdmeLOG) subcutaneous injection, 2-9 Units, Subcutaneous, 4X/DAY ACHS, 5 Units at 02/03/25 1242 **AND** POC blood glucose manual result, , , Q AC AND BEDTIME(S) **AND** NOTIFY MD and PharmD, , , Once **AND** Administer 20 grams of glucose (approximately 8 ounces of fruit juice) every 15 minutes PRN FSBG less than 70 mg/dL, , , PRN **AND** dextrose 50% (D50W) injection 25 g, 25 g, Intravenous, Q15 MIN PRN, Cr Sánchez M.D., 25 g at 01/30/25 0934    Pharmacy Consult Request ...Pain Management Review 1 Each, 1 Each, Other, PHARMACY TO DOSE, Anny A. Sarah, A.P.R.N.    Labs:  Lab Results   Component Value Date/Time    SODIUM 140 02/03/2025 03:31 AM    POTASSIUM 3.8 02/03/2025 03:31 AM    CHLORIDE 101 02/03/2025 03:31 AM    CO2 28 02/03/2025 03:31 AM    GLUCOSE 60 (L) 02/03/2025 03:31 AM    BUN 33 (H) 02/03/2025 03:31 AM    CREATININE 3.22 (H) 02/03/2025 03:31 AM    CREATININE 1.0 01/08/2009 04:31 PM     Lab Results   Component Value Date/Time    PROTHROMBTM 12.4 12/05/2024 01:22 PM    INR 0.93 12/05/2024 01:22 PM     Lab Results   Component Value  Date/Time    WBC 7.4 02/03/2025 03:31 AM    RBC 3.30 (L) 02/03/2025 03:31 AM    HEMOGLOBIN 10.4 (L) 02/03/2025 03:31 AM    HEMATOCRIT 32.4 (L) 02/03/2025 03:31 AM    MCV 98.2 (H) 02/03/2025 03:31 AM    MCH 31.5 02/03/2025 03:31 AM    MCHC 32.1 (L) 02/03/2025 03:31 AM    MPV 10.3 02/03/2025 03:31 AM    NEUTSPOLYS 73.20 (H) 01/22/2025 05:59 AM    LYMPHOCYTES 12.30 (L) 01/22/2025 05:59 AM    MONOCYTES 9.20 01/22/2025 05:59 AM    EOSINOPHILS 3.60 01/22/2025 05:59 AM    BASOPHILS 1.00 01/22/2025 05:59 AM    HYPOCHROMIA 1+ 01/28/2020 04:09 AM    ANISOCYTOSIS 1+ 10/26/2021 03:08 AM     Plt-179k (2/3/25)  Ca2+: 7.8 (2/3/25) Corrected Ca2+: 8.6  (2/3/25)  Bili-:0.4 (1/22/25)      AST/ALT: 136/101 (1/22/25)  Mg2+: 2.1 (1/3/25)    ANC: 10.10 (1/22/25)  TSH:4.230 (10/18/24)    Free T4: 1.12 (7/23/24)        B12-: 997 (10/18/24)        Vit D: 51 (10/18/24)        ECG: Qtc-460  Intervals                                Axis   Rate:       87                           P:          62   RI:         168                          QRS:        31   QRSD:       102                          T:          58   QT:         382   QTc:        460     Interpretive Statements   Sinus rhythm   Probable left atrial enlargement   Low voltage, extremity leads   Compared to ECG 01/10/2025 07:11:45   Low QRS voltage now present   Electronically Signed On 01- 12:26:36 PST by KIMBERLY WYLIE MD         ASSESSMENT:   Ms. Manuel is a 69 year old female with past history of depression, HTN, DM, ESRD who goes to dialysis three times a week. She presented to the ED on 1/22/25 for bilateral upper arm pain and chronic back pain.    Patient overall feels better and states her mood has improved. She believes her current medication regimen is really helpful and she also endorses practicing some of the mindfulness exercises I recommended. Her mood is better, patient is more calm, she is sleeping well and states she has slept really well the last two nights.  Her pain has improved which she believes is why she feels more energized and why her mood has improved. She denies SI/HI, delusions, auditory or visual hallucinations. She has still had occasional anxiety but improved from when she first arrived at the hospital. The anxiety has been relieved with hydroxyzine and the mindfulness exercises. Recommended to continue current medication regimen as this has been really helpful with patient's mood, continue psychotherapy while in hospital, continue mindfulness exercises when needed, and provided patient with outpatient psychiatric resources for follow up care. Patient understands and is agreeable to this plan.       Dx:  MDD, recurrent, moderate with anxious distress     Plan/Further Workup:   Legal status: Not applicable  -Psychotherapy will see patient before she is discharged  -Provided patient with outpatient psychiatric resources for follow-up care      Medication Managment:  -Continue Trazodone 150 mg PO nightly for insomnia  -Continue Effexor 150 mg PO daily for depression  -Continue buspirone 5 mg BID for anxiety   -Continue PRN hydroxyzine 25 mg q6H as needed for anxiety      Discussed with another provider: Dr. Annie Esposito DO               Signing off, please reconsult with any further behavioral health concerns  Thank you for the consult.

## 2025-02-04 NOTE — OR SURGEON
Immediate Post- Operative Note    PostOp Diagnosis: RENAL FAILURE      Procedure(s): RIGHT INTERNAL JUGULAR 14.5 Pakistani 23 CM GLIDEPATH TUNNELED HEMODIALYSIS CATHETER PLACEMENT WITH U/S AND FLUOROSCOPIC GUIDANCE      Estimated Blood Loss: <20CC (FLUSHES)      FINDINGS: CATH TIP AT RA        Complications: NONE          2/4/2025     1:46 PM     Seun Rothman M.D.

## 2025-02-04 NOTE — CARE PLAN
Problem: Knowledge Deficit - Standard  Goal: Patient and family/care givers will demonstrate understanding of plan of care, disease process/condition, diagnostic tests and medications  Outcome: Progressing  Note: Education provided to the pt all questions answered. Teach back method was used.   The patient is Stable - Low risk of patient condition declining or worsening    Shift Goals  Clinical Goals: Pt's pain will remain at comofrt level, Skin will remain intact  Patient Goals: Comfort and pain control  Family Goals: RILEY    Progress made toward(s) clinical / shift goals:  Education provided to the pt all questions were answered. Pt has been able to ambulate to and from the bathroom. Pain has been controlled at comfort level.

## 2025-02-04 NOTE — PROGRESS NOTES
Assumed care of pt 02/03/2025  PM assessment complete  Education provided to the pt all questions answered. Teach back method was used.  Call light left within reach, hourly rounding in place. Pt has been able to ambulate up to the bathroom and back to bed

## 2025-02-04 NOTE — CONSULTS
"[facility]   Psychotherapy Session Follow up    Name: Anu Manuel  MRN: [id number]  : 1955  Age: 69 y.o.  Date of assessment: 25  PCP: PAM Maxwell.  Persons in attendance: Patient, MSW Student Intern    HPI Per Medial Record  \"Anu Manuel is a 69 y.o. female who presented 2025 with bilateral arm pain.  Ms. Manuel has a past medical history of insulin-dependent diabetes mellitus, end-stage renal disease on dialysis via a right tunneled catheter, coronary artery disease that was most recently admitted here  through January 3 with acute on chronic back pain and then was admitted again from 2025 through 2025.  She presents today with bilateral arm pain she feels is coming from her neck and cannot go to dialysis because of it and complains of hopelessness.  Upon review of the records the MRI of the cervical spine from 2025 revealed abnormal bone marrow signal with raising the possibility of infection and short-term follow-up imaging was recommended.  Ms. Manuel received IV Dilaudid in the emergency room will be placed under observation status with oral oxycodone for pain management for now.  MRI has been ordered to repeat with contrast and she will have dialysis by nephrology as well.\"    Behavioral Health Follow Up Psychotherapy Session.    Chief Complaint/Presenting Issue  Patient was seen lying in hospital bed resting but easy to arouse. Patient was alert, oriented, speech clear and coherent, and easy to engage in the interview process. Patient denied audio and visual hallucinations. Patient denied suicidal and homicidal ideation. Patient did not present any thought disorders. Patient reported to be feeling well. Patient reported her pain is manageable today. Patient reported to have low anxiety. Patient reported to not get any sleep last night due to noise of people talking and laughing in room visiting her roommate. " "  Patient reported to enjoy working on crafts. Patient stated, \"I have a craft room and lot's of crafts that I plan to do with my grand kids when I get home.\" Patient reported she was given a number for an outpatient therapist and will call to schedule appointment before she is discharged.    Psychotherapy Session Summary  Clinician provided encouragement and support to patient. Patient reported to be feeling better and is looking forward to her outpatient therapy sessions she will schedule.     Chief Complaint   Patient presents with    Arm Pain     BIBA from home for c/o BUE pain for 4 weeks, EMS gave 100mcg fentanyl IVP.  Missed HD and pain management d/t pain            SAFETY ASSESSMENT - SELF  Does patient acknowledge current or past symptoms of dangerousness to self? no   Does parent/significant other report patient has current or past symptoms of dangerousness to self? N\A     Does presenting problem suggest symptoms of dangerousness to self? No      SAFETY ASSESSMENT - OTHERS  Does patient acknowledge current or past symptoms of aggressive behavior or risk to others? no   Does parent/significant other report patient has current or past symptoms of aggressive behavior or risk to others? N\A     Does presenting problem suggest symptoms of dangerousness to others?  No      Crisis Safety Plan completed and copy given to patient? N\A      MENTAL STATUS  Participation Active verbal participation, Attentive, and Engaged   Grooming Casual   Orientation Alert and Fully Oriented   Behavior Calm   Eye contact Good   Mood Euthymic   Affect Bright and Happy   Thought Process Logical   Thought Content Within normal limits   Speech Rate within normal limits   Perception Within normal limits   Memory No gross evidence of memory deficits   Insight Good   Judgement Good   Other        CLINICAL IMPRESSIONS:  Primary:  Psychiatry diagnosis: MDD, recurrent, moderate with anxious distress   Secondary:                             "               Legal Hold: N/A    Uyen Lopez, PhD., [facility]  Katherine Banks, Student  2/4/2025    Length of Intervention:  30 minutes

## 2025-02-04 NOTE — DISCHARGE PLANNING
Case Management Discharge Planning    Admission Date: 1/22/2025  GMLOS: 4  ALOS: 11    6-Clicks ADL Score: 23  6-Clicks Mobility Score: 24      Anticipated Discharge Dispo: Discharge Disposition: D/T to home under [facility] care in anticipation of covered skilled care (06)    DME Needed: No    Action(s) Taken: Chart review completed. Patient discussed in IDT rounds.     Per IDT, patient is anticipated to be [facility] to discharge today, pending line placement for dialysis; dialysis frequency has increased from 2x/week to 3x/week    RNCM reached out to dialysis coordinator via Voalte and teams to notify of discharge and increase in dialysis days/week       1500: RNCM sent Voalte to [facility] re: notify of patient's discharge from hospital today

## 2025-02-04 NOTE — DISCHARGE PLANNING
Outpatient Dialysis Note     Home clinic:      [facility]  [address] [place] [id number]    Confirmed 3x/ week treatment upon discharge.    Schedule: Mon, Wed, Fri   Time: 2:30 PM     Per Sepideh IV antibiotic orders were received and confirmed for treatment tomorrow.      Relayed to BOBBY Leahy.      Xitlaly Reyes   Dialysis Coordinator / [facility]  Ph: [phone]

## 2025-02-04 NOTE — CARE PLAN
The patient is Stable - Low risk of patient condition declining or worsening    Shift Goals  Clinical Goals: Pt will have permacath and have pain managed.  Patient Goals: Pt wants to have pain controlled  Family Goals: RILEY    Progress made toward(s) clinical / shift goals:      Pt remained AOX4 and R chest port was placed 2/4/25. All needs met and all questions answered during shift.    Problem: Knowledge Deficit - Standard  Goal: Patient and family/care givers will demonstrate understanding of plan of care, disease process/condition, diagnostic tests and medications  2/4/2025 1505 by Rush Almanzar R.N.  Outcome: Progressing  Note: Pt updated with POC and expressed their understanding with verbal acknowledgement.  2/4/2025 1500 by Rush Almanzar R.N.  Outcome: Progressing  Note: Pt updated with POC and expressed their understanding with verbal acknowledgment.     Problem: Pain - Standard  Goal: Alleviation of pain or a reduction in pain to the patient’s comfort goal  Outcome: Progressing  Note: Pt pain was managed with medications as needed per MAR to managed shoulder and back pain.       Patient is not progressing towards the following goals:

## 2025-02-04 NOTE — PROGRESS NOTES
Pt presents to IR3. Pt was consented by MD at bedside, confirmed by this RN and consent at bedside. Pt transferred to IR table in supine position. Patient underwent a Tunneled HD Line Placement by Dr. Rothman. Procedure site was marked by MD and verified using imaging guidance. Pt placed on monitor, prepped and draped in a sterile fashion. Vitals were taken every 5 minutes and remained stable during procedure (see doc flow sheet for results). CO2 waveform capnography was monitored and remained WNL throughout procedure. Report called to Rush MORSE. Pt transported by stretcher with RN to S524-2      [facility] [address] Long Term Hemodialysis Catheter 14.5 F x 23 cm   REF: [id number]  LOT: [id number]  EXP: 2026-05-31

## 2025-02-05 VITALS
HEART RATE: 60 BPM | HEIGHT: 66 IN | DIASTOLIC BLOOD PRESSURE: 77 MMHG | RESPIRATION RATE: 16 BRPM | WEIGHT: 125.22 LBS | SYSTOLIC BLOOD PRESSURE: 144 MMHG | BODY MASS INDEX: 20.12 KG/M2 | OXYGEN SATURATION: 98 % | TEMPERATURE: 96.9 F

## 2025-02-05 LAB
ALBUMIN SERPL BCP-MCNC: 3.3 G/DL (ref 3.2–4.9)
ALBUMIN/GLOB SERPL: 0.8 G/DL
ALP SERPL-CCNC: 1232 U/L (ref 30–99)
ALT SERPL-CCNC: 115 U/L (ref 2–50)
ANION GAP SERPL CALC-SCNC: 15 MMOL/L (ref 7–16)
AST SERPL-CCNC: 159 U/L (ref 12–45)
BASOPHILS # BLD AUTO: 1.1 % (ref 0–1.8)
BASOPHILS # BLD: 0.11 K/UL (ref 0–0.12)
BILIRUB SERPL-MCNC: 0.4 MG/DL (ref 0.1–1.5)
BUN SERPL-MCNC: 46 MG/DL (ref 8–22)
CALCIUM ALBUM COR SERPL-MCNC: 8.7 MG/DL (ref 8.5–10.5)
CALCIUM SERPL-MCNC: 8.1 MG/DL (ref 8.5–10.5)
CHLORIDE SERPL-SCNC: 100 MMOL/L (ref 96–112)
CO2 SERPL-SCNC: 23 MMOL/L (ref 20–33)
CREAT SERPL-MCNC: 3.84 MG/DL (ref 0.5–1.4)
EOSINOPHIL # BLD AUTO: 0.37 K/UL (ref 0–0.51)
EOSINOPHIL NFR BLD: 3.8 % (ref 0–6.9)
ERYTHROCYTE [DISTWIDTH] IN BLOOD BY AUTOMATED COUNT: 58.6 FL (ref 35.9–50)
GFR SERPLBLD CREATININE-BSD FMLA CKD-EPI: 12 ML/MIN/1.73 M 2
GLOBULIN SER CALC-MCNC: 3.9 G/DL (ref 1.9–3.5)
GLUCOSE BLD STRIP.AUTO-MCNC: 77 MG/DL (ref 65–99)
GLUCOSE SERPL-MCNC: 167 MG/DL (ref 65–99)
HCT VFR BLD AUTO: 30.4 % (ref 37–47)
HGB BLD-MCNC: 9.9 G/DL (ref 12–16)
IMM GRANULOCYTES # BLD AUTO: 0.04 K/UL (ref 0–0.11)
IMM GRANULOCYTES NFR BLD AUTO: 0.4 % (ref 0–0.9)
LYMPHOCYTES # BLD AUTO: 1.01 K/UL (ref 1–4.8)
LYMPHOCYTES NFR BLD: 10.5 % (ref 22–41)
MCH RBC QN AUTO: 31.8 PG (ref 27–33)
MCHC RBC AUTO-ENTMCNC: 32.6 G/DL (ref 32.2–35.5)
MCV RBC AUTO: 97.7 FL (ref 81.4–97.8)
MONOCYTES # BLD AUTO: 0.75 K/UL (ref 0–0.85)
MONOCYTES NFR BLD AUTO: 7.8 % (ref 0–13.4)
NEUTROPHILS # BLD AUTO: 7.34 K/UL (ref 1.82–7.42)
NEUTROPHILS NFR BLD: 76.4 % (ref 44–72)
NRBC # BLD AUTO: 0 K/UL
NRBC BLD-RTO: 0 /100 WBC (ref 0–0.2)
PHOSPHATE SERPL-MCNC: 4 MG/DL (ref 2.5–4.5)
PLATELET # BLD AUTO: 204 K/UL (ref 164–446)
PMV BLD AUTO: 10.3 FL (ref 9–12.9)
POTASSIUM SERPL-SCNC: 3.9 MMOL/L (ref 3.6–5.5)
PROT SERPL-MCNC: 7.2 G/DL (ref 6–8.2)
RBC # BLD AUTO: 3.11 M/UL (ref 4.2–5.4)
SODIUM SERPL-SCNC: 138 MMOL/L (ref 135–145)
VANCOMYCIN SERPL-MCNC: 18.9 UG/ML
VANCOMYCIN TROUGH SERPL-MCNC: 19.1 UG/ML (ref 10–20)
WBC # BLD AUTO: 9.6 K/UL (ref 4.8–10.8)

## 2025-02-05 PROCEDURE — 82962 GLUCOSE BLOOD TEST: CPT

## 2025-02-05 PROCEDURE — A9270 NON-COVERED ITEM OR SERVICE: HCPCS | Performed by: HOSPITALIST

## 2025-02-05 PROCEDURE — 80202 ASSAY OF VANCOMYCIN: CPT

## 2025-02-05 PROCEDURE — A9270 NON-COVERED ITEM OR SERVICE: HCPCS | Performed by: INTERNAL MEDICINE

## 2025-02-05 PROCEDURE — A9270 NON-COVERED ITEM OR SERVICE: HCPCS | Performed by: NEUROLOGICAL SURGERY

## 2025-02-05 PROCEDURE — 84100 ASSAY OF PHOSPHORUS: CPT

## 2025-02-05 PROCEDURE — 700102 HCHG RX REV CODE 250 W/ 637 OVERRIDE(OP): Performed by: NEUROLOGICAL SURGERY

## 2025-02-05 PROCEDURE — 36415 COLL VENOUS BLD VENIPUNCTURE: CPT

## 2025-02-05 PROCEDURE — 700102 HCHG RX REV CODE 250 W/ 637 OVERRIDE(OP): Performed by: INTERNAL MEDICINE

## 2025-02-05 PROCEDURE — 85025 COMPLETE CBC W/AUTO DIFF WBC: CPT

## 2025-02-05 PROCEDURE — 80053 COMPREHEN METABOLIC PANEL: CPT

## 2025-02-05 PROCEDURE — 700102 HCHG RX REV CODE 250 W/ 637 OVERRIDE(OP): Performed by: HOSPITALIST

## 2025-02-05 PROCEDURE — 99239 HOSP IP/OBS DSCHRG MGMT >30: CPT | Performed by: HOSPITALIST

## 2025-02-05 RX ORDER — HEPARIN SODIUM 5000 [USP'U]/ML
5000 INJECTION, SOLUTION INTRAVENOUS; SUBCUTANEOUS EVERY 8 HOURS
Status: DISCONTINUED | OUTPATIENT
Start: 2025-02-05 | End: 2025-02-05 | Stop reason: HOSPADM

## 2025-02-05 RX ORDER — VENLAFAXINE HYDROCHLORIDE 150 MG/1
150 CAPSULE, EXTENDED RELEASE ORAL DAILY
Qty: 30 CAPSULE | Refills: 3 | Status: SHIPPED | OUTPATIENT
Start: 2025-02-05 | End: 2025-02-12

## 2025-02-05 RX ORDER — TRAZODONE HYDROCHLORIDE 150 MG/1
150 TABLET ORAL
Qty: 30 TABLET | Refills: 3 | Status: SHIPPED | OUTPATIENT
Start: 2025-02-05 | End: 2025-02-12

## 2025-02-05 RX ORDER — MORPHINE SULFATE 15 MG/1
15 TABLET, FILM COATED, EXTENDED RELEASE ORAL EVERY 12 HOURS
Qty: 14 TABLET | Refills: 0 | Status: SHIPPED | OUTPATIENT
Start: 2025-02-05 | End: 2025-02-12

## 2025-02-05 RX ADMIN — AMLODIPINE BESYLATE 10 MG: 10 TABLET ORAL at 06:09

## 2025-02-05 RX ADMIN — TORSEMIDE 100 MG: 100 TABLET ORAL at 06:10

## 2025-02-05 RX ADMIN — ACETAMINOPHEN 500 MG: 500 TABLET ORAL at 06:10

## 2025-02-05 RX ADMIN — OXYCODONE HYDROCHLORIDE 10 MG: 10 TABLET ORAL at 06:23

## 2025-02-05 RX ADMIN — METHOCARBAMOL 750 MG: 750 TABLET ORAL at 06:09

## 2025-02-05 RX ADMIN — VENLAFAXINE HYDROCHLORIDE 150 MG: 75 CAPSULE, EXTENDED RELEASE ORAL at 06:10

## 2025-02-05 RX ADMIN — PETROLATUM: 420 OINTMENT TOPICAL at 06:12

## 2025-02-05 RX ADMIN — BUSPIRONE HYDROCHLORIDE 5 MG: 10 TABLET ORAL at 06:10

## 2025-02-05 RX ADMIN — LEVOTHYROXINE SODIUM 250 MCG: 0.12 TABLET ORAL at 06:10

## 2025-02-05 RX ADMIN — OXYCODONE HYDROCHLORIDE 15 MG: 15 TABLET ORAL at 09:24

## 2025-02-05 ASSESSMENT — PAIN DESCRIPTION - PAIN TYPE
TYPE: ACUTE PAIN
TYPE: ACUTE PAIN;NEUROPATHIC PAIN
TYPE: ACUTE PAIN
TYPE: ACUTE PAIN;NEUROPATHIC PAIN
TYPE: ACUTE PAIN;NEUROPATHIC PAIN

## 2025-02-05 NOTE — WOUND TEAM
[facility]  Inpatient Services  Initial Wound and Skin Care Evaluation    Admission Date: 1/22/2025     Last order of IP CONSULT TO WOUND CARE was found on 2/3/2025 from Hospital Encounter on 1/22/2025     HPI, PMH, SH: Reviewed    Past Surgical History:   Procedure Laterality Date    AV FISTULA REVISION Left 12/5/2024    Procedure: LIGATION OF LEFT UPPER ARM DIALYSIS GRAFT;  Surgeon: Denis Brown M.D.;  Location: SURGERY [facility];  Service: General    PB REMOVE PERM CANNULA/CATHETER  09/23/2024    Procedure: REMOVAL OF PERITONEAL DIALYSIS CATHETER;  Surgeon: Denis Brown M.D.;  Location: SURGERY [facility];  Service: General    AV FISTULA CREATION Left 09/23/2024    Procedure: CREATION OF LEFT UPPER EXTREMITY DIALYSIS GRAFT;  Surgeon: Denis Brown M.D.;  Location: SURGERY [facility];  Service: General    PB LAP INSERT INTRAPERITONEAL CATHETER  06/20/2024    Procedure: LAPAROSCOPIC INSERTION OF PERITONEAL DIALYSIS CATHETER;  Surgeon: Denis Brown M.D.;  Location: SURGERY [facility];  Service: General    GA ERCP,DIAGNOSTIC N/A 01/14/2022    Procedure: ERCP, DIAGNOSTIC - WITH SIGMOID COLON BIOPSY AND STENT REMOVAL;  Surgeon: Cr Haro M.D.;  Location: [facility];  Service: Gastroenterology    THADDEUS BY LAPAROSCOPY N/A 10/28/2021    Procedure: CHOLECYSTECTOMY, LAPAROSCOPIC;  Surgeon: Tello Trammell M.D.;  Location: [facility];  Service: General    GA ERCP,DIAGNOSTIC N/A 10/26/2021    Procedure: ERCP (ENDOSCOPIC RETROGRADE CHOLANGIOPANCREATOGRAPHY);  Surgeon: Cr Haro M.D.;  Location: SURGERY SAME DAY [facility];  Service: Gastroenterology    GA UPPER GI ENDOSCOPY,BIOPSY N/A 10/26/2021    Procedure: GASTROSCOPY, WITH BIOPSY;  Surgeon: Cr Haro M.D.;  Location: SURGERY SAME DAY [facility];  Service: Gastroenterology    GA UPPER GI ENDOSCOPY,DIAGNOSIS N/A 10/26/2021    Procedure: GASTROSCOPY;  Surgeon: Cr Haro M.D.;  Location: SURGERY SAME DAY [facility];   Service: Gastroenterology    CATH PLACEMENT  01/25/2020    Procedure: INSERTION, CATHETER PERM;  Surgeon: Rola Mendoza M.D.;  Location: SURGERY [facility];  Service: General    IRRIGATION & DEBRIDEMENT NEURO  01/19/2020    Procedure: IRRIGATION AND DEBRIDEMENT, WOUND THORACIC AND LUMBAR;  Surgeon: Ryan Roman M.D.;  Location: [facility];  Service: Neurosurgery    NC INS/RPLC SPI NPG/RCVR POCKET N/A 12/16/2019    Procedure: EXPLORATION AT THORACIC 8 - 9, REPLACEMENT OF  NEUROSTIMULATOR LEAD;  Surgeon: Ryan Roman M.D.;  Location: SURGERY [facility];  Service: Neurosurgery    SPINAL CORD STIMULATOR N/A 10/26/2018    Procedure: SPINAL CORD STIMULATOR;  Surgeon: Ryan Roman M.D.;  Location: [facility];  Service: Neurosurgery    THORACIC LAMINECTOMY N/A 10/26/2018    Procedure: THORACIC LAMINECTOMY - FOR;  Surgeon: Ryan Roman M.D.;  Location: [facility];  Service: Neurosurgery    NC NJX AA&/STRD TFRML EPI LUMBAR/SACRAL 1 LEVEL Right 08/31/2016    Procedure: INJ-FORAMEN EPI LUM/SAC SNGL L4-5;  Surgeon: Sukhi Godfrey M.D.;  Location: [facility];  Service: Pain Management    LUMBAR LAMINECTOMY DISKECTOMY Right 05/10/2016    Procedure: LUMBAR L4-5 HEMILAMINECTOMY DISKECTOMY ;  Surgeon: Arnold Keyes M.D.;  Location: [facility];  Service:     CERVICAL FUSION POSTERIOR  01/16/2009    Performed by TARA CONTRERAS at [facility]    HARDWARE REMOVAL NEURO  01/16/2009    Performed by TARA CONTRERAS at [facility]    NECK EXPLORATION  01/16/2009    Performed by TARA CONTRERAS at [facility]    SHOULDER ARTHROSCOPY W/ ROTATOR CUFF REPAIR  10/09/2008    Performed by SHERLY CASTANEDA at [facility]    SHOULDER DECOMPRESSION ARTHROSCOPIC  06/17/2008    Performed by SHERLY CASTANEDA at [facility]    CLAVICLE DISTAL EXCISION  06/17/2008    Performed by SHERLY CASTANEDA at  SURGERY [facility]    CERVICAL DISK AND FUSION ANTERIOR  03/12/2008    HYSTERECTOMY, VAGINAL  2006    PARTIAL    APPENDECTOMY  2004    THYROID LOBECTOMY  1973    TONSILLECTOMY  1963    CATARACT PHACO WITH IOL      LUMPECTOMY  1976, 2005    Breast     LUMPECTOMY      OTHER ABDOMINAL SURGERY  2004    OTHER CARDIAC SURGERY  2020 stents inserted     Social History     Tobacco Use    Smoking status: Every Day     Current packs/day: 0.50     Average packs/day: 0.5 packs/day for 30.0 years (15.0 ttl pk-yrs)     Types: Cigarettes    Smokeless tobacco: Never    Tobacco comments:     Currently smokes 1/2 - 3/4 a pack daily.  Has smoked for about 50 years.   Substance Use Topics    Alcohol use: Not Currently     Chief Complaint   Patient presents with    Arm Pain     BIBA from home for c/o BUE pain for 4 weeks, EMS gave 100mcg fentanyl IVP.  Missed HD and pain management d/t pain       Diagnosis: Radiculopathy affecting upper extremity [M54.10]  Intractable back pain [M54.9]    Unit where seen by Wound Team: [id number]/[id number]     WOUND CONSULT RELATED TO:  Right heel and hallux    WOUND TEAM PLAN OF CARE - Frequency of Follow-up:   Nursing to follow dressing orders written for wound care. Contact wound team if area fails to progress, deteriorates or with any questions/concerns if something comes up before next scheduled follow up (See below as to whether wound is following and frequency of wound follow up)   Weekly - Right heel and hallux    WOUND HISTORY:   Anu Manuel is a 69 y.o. female with past medical history of insulin-dependent diabetes mellitus, ESRD on dialysis, CAD, recent admission for acute on chronic back pain who presented 1/22/2025 with bilateral arm pain.        WOUND ASSESSMENT/LDA  Wound 02/04/25 Diabetic Ulcer Heel Right PTA (Active)   Date First Assessed/Time First Assessed: 02/04/25 1701   Present on Original Admission: Yes  Hand Hygiene Completed: Yes  Primary Wound Type: Diabetic Ulcer   Location: Heel  Laterality: Right  Wound Description (Comments): PTA      Assessments 2/4/2025  5:00 PM   Wound Image      Site Assessment Pink;Yellow;Slough;Painful   Periwound Assessment Callused;Dry   Margins Attached edges;Defined edges   Closure Adhesive bandage   Drainage Amount Scant   Drainage Description Serous   Treatments Cleansed;Nonselective debridement;Site care;CSWD - Conservative Sharp Wound Debridement   Wound Cleansing Approved Wound Cleanser   Periwound Protectant No-sting Skin Prep   Dressing Status Clean;Dry;Intact   Dressing Changed New   Dressing Cleansing/Solutions Not Applicable   Dressing Options Hydrocolloid Thin;Hypafix Tape   Dressing Change/Treatment Frequency Every 72 hrs, and As Needed   NEXT Dressing Change/Treatment Date 02/07/25   NEXT Weekly Photo (Inpatient Only) 02/11/25   Wound Team Following Weekly   Non-staged Wound Description Full thickness   Wound Length (cm) 1.5 cm   Wound Width (cm) 2.5 cm   Wound Surface Area (cm^2) 3.75 cm^2   Shape oval   Wound Odor None   Pulses 2+   WOUND NURSE ONLY - Time Spent with Patient (mins) 60       Wound 02/04/25 Diabetic Ulcer Toe, Hallux Right PTA (Active)   Date First Assessed/Time First Assessed: 02/04/25 1701   Present on Original Admission: Yes  Hand Hygiene Completed: Yes  Primary Wound Type: Diabetic Ulcer  Location: Toe, Hallux  Laterality: Right  Wound Description (Comments): PTA      Assessments 2/4/2025  5:00 PM   Wound Image      Site Assessment Slough   Periwound Assessment Callused   Margins Attached edges;Defined edges   Closure Adhesive bandage   Drainage Amount Scant   Drainage Description Serous   Treatments Cleansed;Nonselective debridement;Site care;CSWD - Conservative Sharp Wound Debridement   Wound Cleansing Approved Wound Cleanser   Periwound Protectant No-sting Skin Prep   Dressing Status Clean;Dry;Intact   Dressing Changed New   Dressing Cleansing/Solutions Not Applicable   Dressing Options Hydrocolloid  Thin;Hypafix Tape   Dressing Change/Treatment Frequency Every 72 hrs, and As Needed   NEXT Dressing Change/Treatment Date 02/07/25   NEXT Weekly Photo (Inpatient Only) 02/11/25   Wound Team Following Weekly   Non-staged Wound Description Full thickness   Wound Length (cm) 3 cm   Wound Width (cm) 3 cm   Wound Surface Area (cm^2) 9 cm^2   Shape irregular   Wound Odor None        Vascular:    DAYANARA:   No results found.    Lab Values:    Lab Results   Component Value Date/Time    WBC 9.4 02/04/2025 01:29 AM    RBC 3.63 (L) 02/04/2025 01:29 AM    HEMOGLOBIN 11.4 (L) 02/04/2025 01:29 AM    HEMATOCRIT 35.2 (L) 02/04/2025 01:29 AM    CREACTPROT 2.53 (H) 02/03/2025 03:30 PM    SEDRATEWES 99 (H) 02/03/2025 03:30 PM    HBA1C 10.6 (H) 10/18/2024 10:14 AM         Culture Results show:  No results found for this or any previous visit (from the past 720 hours).    Pain Level/Medicated:  None, Tolerated without pain medication       INTERVENTIONS BY WOUND TEAM:  Chart and images reviewed. Discussed with bedside RN. All areas of concern (based on picture review, LDA review and discussion with bedside RN) have been thoroughly assessed. Documentation of areas based on significant findings. This RN in to assess patient. Performed standard wound care which includes appropriate positioning, dressing removal and non-selective debridement. Pictures and measurements obtained weekly if/when required.    Wound:  Right heel and Hallux  Preparation for Dressing removal: Open to air  Cleansed/Non-selectively Debrided with:  Wound cleanser and Gauze  Non-Excisional Conservative Sharp debridement: Slough, Eschar, and Callus debrided away using curette and forceps < 20cm2 debrided down to slough and subcutaneous tissue.  No Bleeding noted.  Adele wound: Cleansed with Wound cleanser and Gauze, Prepped with No Sting  Primary Dressing:  Hydrocolloid   Secondary (Outer) Dressing: hypafix tape    Advanced Wound Care Discharge Planning  Number of Clinicians  necessary to complete wound care: 1  Is patient requiring IV pain medications for dressing changes:  No   Length of time for dressing change 60 min. (This does not include chart review, pre-medication time, set up, clean up or time spent charting.)    Interdisciplinary consultation: Patient, Bedside RN ([facility]).  EVALUATION / RATIONALE FOR TREATMENT:     Date:  02/04/25  Wound Status:  Initial evaluation    Patient reporting increased pain in heel and toe and wound team was reconsulted. Ulcers noted to the right heel and hallux appeared with a layer of eschar and slough with thick calluses surrounding the wounds. The calluses and wounds were debrided down with a curette, however I was unable to remove the entire layer of slough d/t patient discomfort. A thin hydrocolloid was placed over the wound beds to continue to encourage autolytic debridement.   Patient states she was possibly getting ready to discharge within the next day or so, and was agreeable to see the Outpatient wound clinic. Referral was placed and offloading shoe ordered.          Goals: Steady decrease in wound area and depth weekly.    NURSING PLAN OF CARE ORDERS:  Dressing changes: See Dressing Care orders    NUTRITION RECOMMENDATIONS   Wound Team Recommendations:  N/A    DIET ORDERS (From admission to next 24h)       Start     Ordered    02/04/25 1421  Diet Order Diet: Consistent CHO (Diabetic)  ALL MEALS        Question:  Diet:  Answer:  Consistent CHO (Diabetic)    02/04/25 1421                    PREVENTATIVE INTERVENTIONS:    Q shift Zeus - performed per nursing policy  Q shift pressure point assessments - performed per nursing policy    Surface/Positioning  Standard/trauma mattress - Currently in Place    Anticipated discharge plans:  Self/Family Care        Vac Discharge Needs:  Vac Discharge plan is purely a recommendation from wound team and not a requirement for discharge unless otherwise stated by physician.  Not Applicable Pt not on a  wound vac

## 2025-02-05 NOTE — PROGRESS NOTES
PIV D/C'd.  Discharge instructions provided to pt.  Pt states understanding.  Pt states all questions have been answered.  Copy of discharge provided to pt.  Signed copy in chart. Pt states that all personal belongings are in possession.  Pt escorted off unit with assistance from [facility] via wheelchair without incident.

## 2025-02-05 NOTE — PROGRESS NOTES
Received report and assumed care of patient at change of shift. Patient is A&Ox4, on RA, and reports no pain at this time. CVC in place. Dressing CDI. Patient assessment completed, bed in lowest position, and call light and personal belongings are within reach. Patient expressed no further needs at this time. Plan for pt is to DC this AM.

## 2025-02-05 NOTE — DISCHARGE SUMMARY
Discharge Summary    CHIEF COMPLAINT ON ADMISSION  Chief Complaint   Patient presents with    Arm Pain     BIBA from home for c/o BUE pain for 4 weeks, EMS gave 100mcg fentanyl IVP.  Missed HD and pain management d/t pain         Reason for Admission  Radiculopathy affecting upper extr*     Admission Date  1/22/2025    CODE STATUS  DNAR/DNI    HPI & HOSPITAL COURSE  This is a 69 y.o. female here with severe bilateral upper extremity pain, found to have bacteremia and C4-C7 vertebral osteomyelitis, presence of hardware.  Aun Manuel is a 69 y.o. female with past medical history of insulin-dependent diabetes mellitus, ESRD on dialysis, CAD, recent admission for acute on chronic back pain who presented 1/22/2025 with bilateral arm pain.  MRI of the cervical spine from 1/8/2025 revealed abnormal bone marrow signal with raising the possibility of infection and short-term follow-up imaging was recommended.  MRI C-spine is ordered  for further evaluation.  Nephrology recommending no further dialysis as patient's kidney function has improved and has no uremic symptoms.  Pending MRI C-spine for further evaluation.     Vertebral osteomyelitis, presence of hardware, we will presume the infection is due to E faecium is 2 of 4 bottles positive.  However, patient with dialysis line and possibly infected thrombus and with hardware in her spine so there is also a risk that the Staph epidermidis is the infectious agent - will treat both   -IR and Neurosurgery declined biopsy, positive blood cx  - Prior fusion of C4-C7 w/ inserts, posterior fusion hardware C4-C7  -Repeat MRI C-spine with C7-T1 discitis and osteomyelitis. No significant epidural phlegmon or epidural abscess pocket.   Bacteremia   -Blood cult on 1/27 2:4 bottles +E faecium &  1:4 bottles +staph epi   -Blood cult on 1/28 & 1/29 -no growth and final  Possible endocarditis, thrombus, concern for infected clot associated with her catheter line  -TTE with  vegetations TV and MV  -RAFIQ equivocal   Sclerotic valves with mitral annular calcifications, but no obvious valvular vegetation.   There is a irregular echogenic density adherent to the right atrial posterior wall by IVC entry with mobile linear density off the density that can be either infections or thrombus.  Does not appear attached to the catheter tip, but cannot completely rule out.   ESRD, on dialysis 2 times weekly  -HD line removed on 2/1     PLAN:   --- Continue vancomycin with pharmacy to dose to treat the E faecium as well as the CoNS bacteremia, VOM and potentially infected thrombus while inpatient.  On discharge will continue vancomycin via dialysis and will need to be ordered by nephrology.  --- Repeat blood culture, ordered today-if any further positive blood cultures, please notify ID  --- Prior blood cultures are no growth and final, if repeat cultures negative at 24 hours okay to replace HD catheter  --- Recommend 6 weeks of IV antibiotics from date of catheter removal, end 3/15/25 -limited antibiotic options particular for the Enterococcus faecium so will likely stop antibiotics and monitor at that point if she is doing well and labs unremarkable.  If concern for ongoing infection we would then order a repeat MRI and extend the antibiotics.  --- ESR and CRP ordered for trending purposes  --- Will need at least weekly labs while on antibiotics, CBC, CMP and Vanco trough, vancomycin will need to be managed and ordered by nephrology  --- Recommend 3 times weekly dialysis while on vancomycin     Follow-up in ID clinic in 2 weeks and then prior to stopping antibiotic    2/5: Patient had reinsertion of HD catheter on 2/4/2025 right chest without complication.  She will start MWF HD with vancomycin 1000mg IV MWF post HD until 3/15/25.    She is eating, on RA, ambulating well and wanting to go home.   Her pain has much improved since admission, no longer c/o radicular arm pain.  Confirmed with  nephrologist Dr. Anderson, vancomycin ordered to HD clinic.  Patient states she is in need of her oxycontin long acting pain medication that was started this hospitalization in addition to short acting oxycodone.  She is in tears on exam.  1 week ordered with pain management referral placed for 1 week.  Follow up with psychiatry, referral placed.  Continued all her medications.   Of note, patient states she has autoimmune hepatitis for last 4 years, she is s/p cholecystectomy remote, no liver pain.    Follow up with her PCP and or GI.     Therefore, she is discharged in good and stable condition to home with close outpatient follow-up.    The patient met 2-midnight criteria for an inpatient stay at the time of discharge.    Discharge Date  2/5/2025    FOLLOW UP ITEMS POST DISCHARGE  Follow up with ID in 2 weeks for recheck.  Follow up nephrology vancomycin IV dosing and monitoring post HD MWF until 3/15/2025.  [place] [facility] [place] in 6 weeks post antibiotics.  Referral to pain management in 1 week.  Referral to psychiatry for depression.    DISCHARGE DIAGNOSES  Principal Problem:    Radiculopathy affecting upper extremity (POA: Yes)  Active Problems:    Primary hypertension (POA: Yes)    CAD S/P percutaneous coronary angioplasty (POA: Yes)    ESRD needing dialysis (HCC) (POA: Yes)    DNR (do not resuscitate) (POA: Yes)    Type 2 diabetes mellitus, with long-term current use of insulin (HCC) (POA: Yes)    Intractable back pain (POA: Yes)    CKD (chronic kidney disease) stage 4, GFR 15-29 ml/min (HCC) (POA: Yes)    Bacteremia due to Enterococcus (POA: Yes)    Acute osteomyelitis of cervical spine (HCC) (POA: Yes)    Right atrial mass (POA: Yes)  Resolved Problems:    * No resolved hospital problems. *      FOLLOW UP  No future appointments.  Marianna Reeves M.D.  [address] [address]  [phone]    Follow up  Follow-up with infectious disease in 2 weeks    Anny Flores,  A.P.N.  [address]  [facility] [address]  [phone]    Follow up  Please call your Primary Care Provider to schedule an follow-up in 1 to 2 weeks. Thank you.    Tomi Rose D.O.  [address]  [facility] [id number] [phone]    Follow up  Follow-up with spine surgery after completion of IV antibiotics if you continue to have ongoing pain    [facility]  [address] [facility] [address]  [place] [address]  [phone]          MEDICATIONS ON DISCHARGE     Medication List        START taking these medications        Instructions   busPIRone 5 MG tablet  Commonly known as: Buspar   Take 1 Tablet by mouth 2 times a day.  Dose: 5 mg     morphine ER 15 MG Tbcr tablet  Commonly known as: Ms Contin   Take 1 Tablet by mouth every 12 hours for 7 days.  Dose: 15 mg     torsemide 100 MG Tabs  Commonly known as: Demadex   Take 1 Tablet by mouth every day.  Dose: 100 mg            CHANGE how you take these medications        Instructions   Lantus SoloStar 100 UNIT/ML Sopn injection  What changed: how much to take  Generic drug: insulin glargine   Inject 5 Units under the skin every evening.  Dose: 5 Units     methocarbamol 750 MG Tabs  What changed:   when to take this  reasons to take this  Commonly known as: Robaxin   Take 1 Tablet by mouth 3 times a day. Indications: Musculoskeletal Pain  Dose: 750 mg     metoprolol SR 50 MG Tb24  What changed:   medication strength  how much to take  Commonly known as: Toprol XL   Take 1 Tablet by mouth every evening. Indications: Atrial Fibrillation  Dose: 50 mg     oxyCODONE immediate release 10 MG immediate release tablet  What changed: reasons to take this  Commonly known as: Roxicodone   Take 1 Tablet by mouth every four hours as needed for Severe Pain for up to 5 days.  Dose: 10 mg            CONTINUE taking these medications        Instructions   amLODIPine 10 MG Tabs  Commonly known as: Norvasc   Take 10 mg by mouth every day.     Indications:  High Blood Pressure  Dose: 10 mg     atorvastatin 40 MG Tabs  Commonly known as: Lipitor   TAKE 1 TABLET BY MOUTH EVERY DAY IN THE EVENING  Dose: 40 mg     BD Pen Needle Ann Marie U/F  Generic drug: Insulin Pen Needle 32 G x 4 mm   Doctor's comments: Per patient/formulary preference. ICD-10 code: E10.65 - Uncontrolled type 1 Diabetes Mellitus  Use one pen needle in pen device to inject insulin once daily.     cyanocobalamin 500 MCG Tabs  Commonly known as: Vitamin B-12   Take 500 mcg by mouth every day. Indications: Inadequate Vitamin B12  Dose: 500 mcg     FreeStyle John 3 Plus Sensor Misc   1 Each every 14 days.  Dose: 1 Each     gabapentin 100 MG Caps  Commonly known as: Neurontin   Doctor's comments: Take immediately after dialysis  Take 1 Capsule by mouth every Monday, Wednesday, and Friday.  Dose: 100 mg     HumaLOG KwikPen 100 UNIT/ML Sopn injection PEN  Generic drug: insulin lispro   Inject 0-9 Units under the skin 3 times a day before meals. 70 - 150 mg/dL = 0 Units 151 - 200 mg/dL = 2 Units 201 - 250 mg/dL = 3 Units 251 - 300 mg/dL = 5 Units 301 - 350 mg/dL = 6 Units 351 - 400 mg/dL = 8 Units Over 400 mg/dL = 9 Units  Dose: 0-9 Units     lidocaine-prilocaine 2.5-2.5 % Crea  Commonly known as: Emla   Apply 1 g topically as needed (AVF prior to dialysis). APPLY TO AFFECTED AREA AVF ACCESS 30-60 MINS PRIOR TO DIALYSIS TREATMENT WRAP IN SARAN WRAP      Indications: Dialysis  Dose: 1 g     liothyronine 5 MCG Tabs  Commonly known as: Cytomel   TAKE 1 TABLET BY MOUTH EVERY DAY  Dose: 5 mcg     Mircera 30 MCG/0.3ML Sosy  Generic drug: Methoxy PEG-Epoetin Beta   Inject 13 Units as directed every 4 weeks.  Dose: 13 Units     pantoprazole 40 MG Tbec  Commonly known as: Protonix   Take 1 Tablet by mouth every day.  Dose: 40 mg     riFAMPin 300 MG Caps  Commonly known as: Rifadine   Take 300 mg by mouth 3 times a day.  Dose: 300 mg     Senokot S 8.6-50 MG Tabs  Generic drug: senna-docusate   Take 1 Tablet by mouth every  day.   Dose: 1 Tablet     sevelamer 800 MG Tabs  Commonly known as: Renagel   Take 800 mg by mouth 3 times a day with meals. Indications: High Amount of Phosphate in the Blood  Dose: 800 mg     Synthroid 125 MCG Tabs  Generic drug: levothyroxine   Take 2 Tablets by mouth every morning on an empty stomach.  Dose: 250 mcg     traZODone 150 MG Tabs  Commonly known as: Desyrel   Take 1 Tablet by mouth at bedtime.  Dose: 150 mg     ursodiol 300 MG Caps  Commonly known as: Actigall   Take 300 mg by mouth 3 times a day. Indications: Liver Disease  Dose: 300 mg     venlafaxine 150 MG extended-release capsule  Commonly known as: Effexor-XR   Take 1 Capsule by mouth every day.  Dose: 150 mg     vitamin D3 1000 Unit (25 mcg) Tabs  Commonly known as: Cholecalciferol   Take 1,000 Units by mouth 2 times a day. Indications: Osteoporosis  Dose: 1,000 Units            STOP taking these medications      cyclobenzaprine 10 mg Tabs  Commonly known as: Flexeril     dexamethasone 4 MG Tabs  Commonly known as: Decadron     DULoxetine 30 MG Cpep  Commonly known as: Cymbalta     famotidine 20 MG Tabs  Commonly known as: Pepcid     omeprazole 20 MG delayed-release capsule  Commonly known as: PriLOSEC              Allergies  Allergies   Allergen Reactions    Tape Unspecified and Rash     Blisters, paper tape is ok  Other reaction(s): Rash       DIET  Orders Placed This Encounter   Procedures    Diet Order Diet: Consistent CHO (Diabetic)     Standing Status:   Standing     Number of Occurrences:   1     Order Specific Question:   Diet:     Answer:   Consistent CHO (Diabetic) [4]       ACTIVITY  As tolerated.  Weight bearing as tolerated    CONSULTATIONS  ID  Nephrology  neurosurgery    PROCEDURES  Immediate Post- Operative Note     PostOp Diagnosis: RENAL FAILURE        Procedure(s): RIGHT INTERNAL JUGULAR 14.5 Macanese 23 CM GLIDEPATH TUNNELED HEMODIALYSIS CATHETER PLACEMENT WITH U/S AND FLUOROSCOPIC GUIDANCE        Estimated Blood Loss: <20CC  (FLUSHES)        FINDINGS: CATH TIP AT RA           Complications: NONE              2/4/2025     1:46 PM     Seun Rothman M.D.  Procedure performed: Transesophageal Echo with Anesthesia present for sedation     : Effie Vanegas MD     Assistant: None     Anesthesia: Per anesthesia services     Indication: bacteremia and abnormal TTE     Preprocedural Diagnosis:   Bacteremia and abnormal TTE  Postprocedural Diagnosis: Same     Description of procedure:     Ms. Manuel was brought to the pre/post procedure area of the cath lab. Informed consent was obtained. Defibrillator pads were placed in the anterior and posterior position.  Adequate sedation was obtained with assistance of anesthesia. A RAFIQ performed confirmed that there was   She was monitored in the recovery area until criteria met and will be discharged home.     Conclusion:    Sclerotic valves with mitral annular calcifications, but no obvious valvular vegetation.   There is a irregular echogenic density adherent to the right atrial posterior wall by IVC entry with mobile linear density off the density that can be either infections or thrombus.  Does not appear attached to the catheter tip, but cannot completely rule out.   Discussed with Dr. Esposito, would talk with radiology to see if can get CT to better assess the mass and see if can distinguish between thrombus vs mass.    Formal cardiology consult not done, but please consult if needed.     EBL: None     Complications: None apparent        Electronically signed: Effie Vanegas MD  Cardiologist [facility]            LABORATORY  Lab Results   Component Value Date    SODIUM 138 02/05/2025    POTASSIUM 3.9 02/05/2025    CHLORIDE 100 02/05/2025    CO2 23 02/05/2025    GLUCOSE 167 (H) 02/05/2025    BUN 46 (H) 02/05/2025    CREATININE 3.84 (H) 02/05/2025    CREATININE 1.0 01/08/2009        Lab Results   Component Value Date    WBC 9.6 02/05/2025    HEMOGLOBIN 9.9 (L)  02/05/2025    HEMATOCRIT 30.4 (L) 02/05/2025    PLATELETCT 204 02/05/2025        Total time of the discharge process exceeds 50 minutes.

## 2025-02-05 NOTE — PROGRESS NOTES
"Pt is a moderate fall risk but is refusing a bed alarm at this time because she \"hasn't had one this whole time and does not need one now.\" Pt educated about the importance of implementing fall precautions and the need to call for assistance prior to getting out of bed. Pt verbalized understanding. Charge MINI Rodriguez notified of pt's refusal of bed alarm.  "

## 2025-02-05 NOTE — PROGRESS NOTES
Hospital Medicine Daily Progress Note    Date of Service  2/4/2025    Chief Complaint  Anu Manuel is a 69 y.o. female admitted 1/22/2025 with neck pain     Hospital Course  Anu Manuel is a 69 y.o. female with past medical history of insulin-dependent diabetes mellitus, ESRD on dialysis, CAD, recent admission for acute on chronic back pain who presented 1/22/2025 with bilateral arm pain.  MRI of the cervical spine from 1/8/2025 revealed abnormal bone marrow signal with raising the possibility of infection and short-term follow-up imaging was recommended. Nephrology consulted for scheduled dialysis.  MRI cervical spine showed C7-T1 findings concerning for discitis/osteomyelitis with moderate spinal canal stenosis at C6-7.  Neurosurgery was consulted recommended IR bone biopsy and conservative management.  Did not recommend surgery at this time.  Discussed with IR however they do not perform cervical bone biopsies.  Surgery then recommended ID consults and conservative treatment of antibiotics.  Patient was positive for Enterococcus faecium bacteremia and infectious disease was consulted started on IV antibiotics.  Initial echocardiogram was concerning for endocarditis and cardiology was consulted for RAFIQ which was performed 1/30 showed heterogenous echogenic density seen attached to the posterior/inferior right atrial wall by entrance of IVC with multiple friable small linear densities seen coming off.  ID recommending treatment for endocarditis.  Due to severe ongoing pain repeat MRI was done that was stable as well as venous ultrasound of her upper extremity which was negative.  HD catheter was removed 2/1 and patient will need a line holiday for 72 hours.  Infectious disease recommending 6 weeks of IV vancomycin 3 times weekly with dialysis end date 3/15/2025.  HD catheter has been cleared to be replaced on 2/4 by IR.     During hospitalization patient with severe anxiety.  Psychiatry was  consulted and recommended to start BuSpar 5 mg twice daily with Atarax as needed and psychotherapy.    Interval Problem Update  2/3 no acute events overnight  WBC 7.4, hemoglobin 10.4  Creatinine 3.22, GFR 15, electrolytes stable  Glucose in the 400s yesterday, per bedside nurse she had been eating candy  Repeat blood cultures 1/29 negative to date  Discussed with infectious disease okay to have IR place dialysis catheter tomorrow, she will be discharged on vancomycin with dialysis and will need to follow-up with infectious disease clinic in 2 weeks  IR consult placed, n.p.o. at midnight  Discussed with nephrology, they will be able to accommodate patient's 3 times a week dialysis while on vancomycin once patient is discharged  Patient doing well, denies any new complaints.  Pain appears controlled at time of my evaluation, still complains of mid back pain between her shoulder blades.   2/4:  d/w Dr. Anderson, orders in place for vancomycin IV MWF after HD.  IR placed HD catheter right chest. Decreased pain.    I have discussed this patient's plan of care and discharge plan at IDT rounds today with Case Management, Nursing, Nursing leadership, and other members of the IDT team.    Consultants/Specialty  nephrology  Infectious disease  Cardiology  Psychiatry    Code Status  DNAR/DNI    Disposition  The patient is medically cleared for discharge to home or a post-acute facility.  Anticipate discharge to: home with close outpatient follow-up    I have placed the appropriate orders for post-discharge needs.    Review of Systems  Review of Systems   Constitutional:  Positive for malaise/fatigue. Negative for fever.   Respiratory:  Negative for shortness of breath.    Cardiovascular:  Negative for chest pain and leg swelling.   Gastrointestinal:  Negative for abdominal pain and nausea.   Musculoskeletal:  Positive for back pain, joint pain and neck pain.   Neurological:  Negative for headaches.   Psychiatric/Behavioral:  The  patient is nervous/anxious.         Physical Exam  Temp:  [35.8 °C (96.5 °F)-36.2 °C (97.1 °F)] 36.1 °C (96.9 °F)  Pulse:  [60-77] 61  Resp:  [9-22] 14  BP: (107-160)/(50-87) 130/69  SpO2:  [91 %-100 %] 97 %    Physical Exam  Vitals and nursing note reviewed.   Constitutional:       General: She is not in acute distress.     Comments: Laying comfortably in bed   HENT:      Mouth/Throat:      Pharynx: Oropharynx is clear.   Eyes:      Conjunctiva/sclera: Conjunctivae normal.   Cardiovascular:      Rate and Rhythm: Normal rate and regular rhythm.   Pulmonary:      Effort: Pulmonary effort is normal. No respiratory distress.      Breath sounds: No wheezing.   Abdominal:      General: There is no distension.      Palpations: Abdomen is soft.      Tenderness: There is no abdominal tenderness.   Musculoskeletal:         General: Tenderness present.      Right lower leg: No edema.      Left lower leg: No edema.   Skin:     General: Skin is warm.   Neurological:      General: No focal deficit present.      Mental Status: She is alert and oriented to person, place, and time.      Motor: No weakness.      Comments: Moving all extremities spontaneously   Psychiatric:         Mood and Affect: Mood normal.         Behavior: Behavior normal. Behavior is cooperative.         Fluids    Intake/Output Summary (Last 24 hours) at 2/4/2025 2244  Last data filed at 2/4/2025 1537  Gross per 24 hour   Intake 260 ml   Output --   Net 260 ml            Laboratory  Recent Labs     02/02/25  0022 02/03/25  0331 02/04/25  0129   WBC 8.6 7.4 9.4   RBC 3.21* 3.30* 3.63*   HEMOGLOBIN 10.1* 10.4* 11.4*   HEMATOCRIT 31.2* 32.4* 35.2*   MCV 97.2 98.2* 97.0   MCH 31.5 31.5 31.4   MCHC 32.4 32.1* 32.4   RDW 58.7* 60.0* 58.6*   PLATELETCT 167 179 211   MPV 9.8 10.3 10.3       Recent Labs     02/02/25  0022 02/03/25  0331 02/04/25  0129   SODIUM 136 140 137   POTASSIUM 4.4 3.8 3.6   CHLORIDE 98 101 98   CO2 27 28 24   GLUCOSE 173* 60* 164*   BUN 18 33*  43*   CREATININE 2.05* 3.22* 3.63*   CALCIUM 8.2* 7.8* 8.2*     Recent Labs     02/04/25  0129   INR 0.88               Imaging  IR-CVC WITH TUNNEL W/O PORT EXCHANGE   Final Result      1. ULTRASOUND AND FLUOROSCOPIC GUIDED PLACEMENT OF A Right INTERNAL JUGULAR 14.5 [employer] TUNNELED DIALYSIS CATHETER.      2. THE HEMODIALYSIS CATHETER MAY BE USED IMMEDIATELY AS CLINICALLY INDICATED. FLUSHES PER PROTOCOL.      IR-CVC TUNNEL W/O PORT REMOVAL   Final Result      Successful removal of tunneled central venous catheter as described.      MR-CERVICAL SPINE-WITH & W/O   Final Result      1.  Postoperative changes as described above.   2.  C7-T1 discitis and osteomyelitis. No significant epidural phlegmon or epidural abscess pocket.   3.  C7-T1 moderate disc/osteophyte change with mild central stenosis.   4.  Multilevel foraminal stenoses as outlined above.   5.  No myelopathic cord signal.      EC-RAFIQ W/O CONT   Final Result      US-EXTREMITY VENOUS UPPER UNILAT LEFT   Final Result      EC-ECHOCARDIOGRAM COMPLETE W/O CONT   Final Result      MR-CERVICAL SPINE-WITH & W/O   Final Result         1.  Postoperative changes in the cervical spine as described above.      2.  Abnormal signal is noted in the prevertebral soft tissues extending from the mid C2 to the C7-T1 level. Abnormal marrow signal is noted at the C7-T1 level with mild enhancement of the intervertebral disc space at this level. These findings are    concerning for discitis-osteomyelitis.      3.  There is moderate spinal canal stenosis at C6-7.         US-RUQ   Final Result      1.  Prior cholecystectomy.      2.  Common bile duct diameter measured at 12 mm with some intrahepatic biliary ductal dilatation. This may be related to presence of prior cholecystectomy.      3.  The liver is heterogeneous consistent with fatty change versus hepatocellular dysfunction.      DX-CHEST-PORTABLE (1 VIEW)   Final Result         1.  No acute cardiopulmonary disease.    2.  Atherosclerosis           Assessment/Plan  * Radiculopathy affecting upper extremity- (present on admission)  Assessment & Plan  Her pain has been debilitating despite Neurontin and Cymbalta.  She had the abnormal MRI noted below.  Because of this the MRI will be repeated as she may benefit from spine surgery consultation for either inpatient or outpatient management based on the results.  .  She will be given oral narcotics and no further IV narcotics at this time in order to start the transition of discharge home eventually on oral   Continue on Cymbalta, gabapentin  MRI C-spine showed osteomyelitis/discitis  Requiring frequent IV narcotics, close monitoring for toxicity while on IV controlled substance.   Neurosurgery recommending conservative management, no surgical intervention at this time  Infectious disease consulted    Right atrial mass- (present on admission)  Assessment & Plan  TTE showed possible tricuspid and mitral valve endocarditis  Cardiology consulted, RAFIQ 1/30- showed right atrial mass with mobile density   Unclear if this is mass versus clot versus infection  Discussed with IR, CT would not delineate this  Infectious disease following  -Will treat for endocarditis    Acute osteomyelitis of cervical spine (HCC)- (present on admission)  Assessment & Plan  MRI showed C7-T1 findings concerning for discitis osteomyelitis  Infectious disease consulted  -Vancomycin 3 times a week with dialysis with end date 3/15/2025  -repeat C spine mri pending  Follow-up blood culture result  Neurosurgery recommended conservative management with IV antibiotics, no surgical intervention  Discussed with IR, unable to perform cervical spine bone biopsy    Bacteremia due to Enterococcus- (present on admission)  Assessment & Plan  1/2 blood cultures Enterococcus  Infectious disease consulted  -Changed to vancomycin  -RAFIQ with atrial clot concerning for infective endocarditis  -Repeat blood cultures negative to  date  Removed HD catheter 2/1  IR consult for permacath replacement 2/4  -N.p.o. at midnight    Once HD catheter in place and cleared by nephrology patient will need IV vancomycin 3 times daily with dialysis with end date 3/15/2025  Outpatient follow-up with infectious disease clinic in 2 weeks    CKD (chronic kidney disease) stage 4, GFR 15-29 ml/min ([facility])- (present on admission)  Assessment & Plan  Continue on torsemide 100 mg daily  Repeat BMP in a.m. to monitor renal function  Nephrology following for dialysis needs      Intractable back pain- (present on admission)  Assessment & Plan  Also complaining of severe burning neuropathic pain in her arm  Increase gabapentin 300 mg daily, continue duloxetine  Start scheduled Tylenol 500 mg every 6 hours  Continue oral and IV opiates as needed    Type 2 diabetes mellitus, with long-term current use of insulin ([facility])- (present on admission)  Assessment & Plan  On sliding scale and glargine  Monitor of for hypoglycemic episode    DNR (do not resuscitate)- (present on admission)  Assessment & Plan  Per her wishes  She has been followed by palliative care in the past        ESRD needing dialysis ([facility])- (present on admission)  Assessment & Plan  She has a left arm fistula  Nephrology will be consulted for dialysis    CAD S/P percutaneous coronary angioplasty- (present on admission)  Assessment & Plan  History of    Primary hypertension- (present on admission)  Assessment & Plan  Continue Norvasc and Toprol with holding parameters    Total time spent in chart review, at bedside with the patient, discussing with consultants, nursing and case management: 56 minutes    VTE prophylaxis: heparin SC    I have performed a physical exam and reviewed and updated ROS and Plan today (2/4/2025). In review of yesterday's note (2/3/2025), there are no changes except as documented above.

## 2025-02-05 NOTE — PROGRESS NOTES
Assumed care of patient at 1900. Received bedside report from day RN Rush. Patient A&Ox4, on RA, Reporting a pain level of 8/10, prn oxy administered. CVC in place to R upper chest. Call light within reach, belongings within reach, fall precautions in place, bed in lowest position. Patient does not have any other needs at this time.     POC was discussed with patient. All questions were answered. Patient verbalized understanding.

## 2025-02-05 NOTE — CARE PLAN
The patient is Stable - Low risk of patient condition declining or worsening    Shift Goals  Clinical Goals: Pt's pain to decrease to 3/10 by end of shift. Pt to remain free from falls.  Patient Goals: Rest, pain management, go home  Family Goals: RILEY    Progress made toward(s) clinical / shift goals:    Pt reported pain ratings of 8/10 and was given prn and scheduled medications. Pt reassessed and pain decreased to 3-4/10. Heat packs utilized for non-pharmacological pain management. Pt able to rest with pain management. Pt remains free from falls and utilizes call light appropriately for needs.     Problem: Knowledge Deficit - Standard  Goal: Patient and family/care givers will demonstrate understanding of plan of care, disease process/condition, diagnostic tests and medications  Outcome: Progressing     Problem: Pain - Standard  Goal: Alleviation of pain or a reduction in pain to the patient’s comfort goal  Outcome: Progressing       Patient is not progressing towards the following goals:

## 2025-02-06 LAB — GLUCOSE BLD STRIP.AUTO-MCNC: 147 MG/DL (ref 65–99)

## 2025-02-06 NOTE — TELEPHONE ENCOUNTER
Patient last seen 9/21/23. Needs appointment for further refills.    To schedulers: please reach out to patient to schedule follow up, thank you!

## 2025-02-06 NOTE — DISCHARGE PLANNING
"TCN following. HTH/SCP chart reviewed. No new TCN needs identified. Please see prior TCN note from 2/4/25 for most recent discharge planning considerations if indicated. Current discharge considerations are noted to be for home with HH ([facility] has accepted) and close outpatient f/u when medically cleared.  Per CM note on 2/4/25, \"dialysis frequency has increased from 2x/week to 3x/week\".  Per review, noted current 6 click scores 24 mobility and per kardex mobility documented at 7 feet X 2 no AD.      Completed:  [facility] accepted.   PT recommends home health - 1/29  OT no needs on 1/31.   Choice obtained: HH (1. [facility] 2. [facility])  Pt aware of [facility]'s blanket referral policy  Noted [facility] has declined patient  SCP with Non[facility] PCP.  "

## 2025-02-06 NOTE — TELEPHONE ENCOUNTER
Is the patient due for a refill? Yes    Was the patient seen the last 15 months? No    Date of last office visit: 09.21.2023    Does the patient have an upcoming appointment?  No    Provider to refill:BE    Does the patient have [facility] and need 100-day supply? (This applies to ALL medications) Patient does not have SCP

## 2025-02-08 LAB
BACTERIA BLD CULT: NORMAL
SIGNIFICANT IND 70042: NORMAL
SITE SITE: NORMAL
SOURCE SOURCE: NORMAL

## 2025-02-08 NOTE — ED PROVIDER NOTES
ED Provider Note    CHIEF COMPLAINT  Chief Complaint   Patient presents with    Bleeding/Bruising     Dialysis cath that is in her R side chest area was accessed today   noticed bleeding on dressing   sent here for evaluation of such         EXTERNAL RECORDS REVIEWED  Patient recently discharged 2/5/2025 where she was admitted for bacteremia secondary to C4-C7 vertebral osteomyelitis in the presence of hardware.  No obvious valvular vegetation on RAFIQ but there was possible infected clot associated with her dialysis catheter line.  Line was removed on 2/1.  She was discharged with plan for IV vancomycin for 6 weeks, which she would obtain at dialysis.  Her new catheter was placed on 2/4/2025 in the right chest.    HPI/ROS  LIMITATION TO HISTORY   Select: : None  OUTSIDE HISTORIAN(S):  Family provides additional history on events of the past week and hospitalization    Anu Manuel is a 69 y.o. female who presents to the ER for bleeding at her tunneled dialysis catheter site.  This was placed on February 4, 3 days ago.  She had dialysis later that day and it seemed to be doing well but today it has been bleeding quite briskly and soaking through dressings in a matter of an hour or 2.  Her dialysis nurse told her to come be evaluated here as they did not have any additional equipment to try and stop the bleeding.  She denies any pain.  No difficulty breathing or lightheadedness.  No bleeding or bruising elsewhere that is new.  Denies any trauma to the area.    PAST MEDICAL HISTORY   has a past medical history of Accidental drug overdose (08/27/2012), Adverse effect of anesthesia, Anemia, Anemia due to chronic kidney disease, on chronic dialysis ([facility]) (12/31/2024), Anesthesia, Arrhythmia, Arthritis, Bowel habit changes (More frequent), Breath shortness, Broken hip ([facility]) (04/2024), Cataract (2022), Cigarette smoker (quit 2013), Dental disorder, depression (08/30/2016), Diabetes mellitus type 1 ([facility]) (1989),  Dialysis patient (HCC) (Short time due to a kidney injury from a infection), Emphysema of lung (HCC), Encounter for long-term (current) use of insulin (HCC) (09/25/2013), GI bleed, Heart burn, High cholesterol, Hypertension, Hypothyroidism, postsurgical (1970), Indigestion, Infectious disease, Jaundice (2021 Liver disease), Joint replacement, Liver disease, Liver failure (HCC), Myocardial infarct (HCC) (2019), Pain, Polyneuropathy in diabetes(357.2) (06/10/2015), Renal disorder, Status post appendectomy, 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    SURGICAL HISTORY   has a past surgical history that includes hysterectomy, vaginal (2006); thyroid lobectomy (1973); lumpectomy (1976, 2005); cervical disk and fusion anterior (03/12/2008); tonsillectomy (1963); cervical fusion posterior (01/16/2009); hardware removal neuro (01/16/2009); neck exploration (01/16/2009); lumpectomy; lumbar laminectomy diskectomy (Right, 05/10/2016); shoulder decompression arthroscopic (06/17/2008); clavicle distal excision (06/17/2008); shoulder arthroscopy w/ rotator cuff repair (10/09/2008); njx aa&/strd tfrml epi lumbar/sacral 1 level (Right, 08/31/2016); spinal cord stimulator (N/A, 10/26/2018); thoracic laminectomy (N/A, 10/26/2018); appendectomy (2004); ins/rplc spi npg/rcvr pocket (N/A, 12/16/2019); irrigation & debridement neuro (01/19/2020); cath placement (01/25/2020); ercp,diagnostic (N/A, 10/26/2021); upper gi endoscopy,biopsy (N/A, 10/26/2021); upper gi endoscopy,diagnosis (N/A, 10/26/2021); peter by laparoscopy (N/A, 10/28/2021); ercp,diagnostic (N/A, 01/14/2022); other cardiac surgery (2020 stents inserted); other abdominal surgery (2004); lap insert intraperitoneal catheter (06/20/2024); cataract phaco with iol; pb remove perm cannula/catheter (09/23/2024); av fistula creation (Left, 09/23/2024); and av fistula revision (Left,  12/5/2024).    FAMILY HISTORY  Family History   Problem Relation Age of Onset    Hypertension Mother     Cancer Father        SOCIAL HISTORY  Social History     Tobacco Use    Smoking status: Every Day     Current packs/day: 0.50     Average packs/day: 0.5 packs/day for 30.0 years (15.0 ttl pk-yrs)     Types: Cigarettes    Smokeless tobacco: Never    Tobacco comments:     Currently smokes 1/2 - 3/4 a pack daily.  Has smoked for about 50 years.   Vaping Use    Vaping status: Never Used   Substance and Sexual Activity    Alcohol use: Not Currently    Drug use: Not Currently     Types: Inhaled, Oral, Marijuana     Comment: Marijuana - Occasional; edibles    Sexual activity: Not Currently       CURRENT MEDICATIONS  Home Medications       Reviewed by Tory Cunha R.N. (Registered Nurse) on 02/07/25 at 2019  Med List Status: Partial     Medication Last Dose Status   amLODIPine (NORVASC) 10 MG Tab  Active   atorvastatin (LIPITOR) 40 MG Tab  Active   busPIRone (BUSPAR) 5 MG tablet  Active   Continuous Glucose Sensor (FREESTYLE PARISA 3 PLUS SENSOR) Misc  Active   cyanocobalamin (VITAMIN B-12) 500 MCG Tab  Active   gabapentin (NEURONTIN) 100 MG Cap  Active   insulin glargine (LANTUS SOLOSTAR) 100 UNIT/ML Solution Pen-injector injection  Active   insulin lispro 100 UNIT/ML SC SOPN injection PEN  Active   Insulin Pen Needle 32 G x 4 mm  Active   lidocaine-prilocaine (EMLA) 2.5-2.5 % Cream  Active   liothyronine (CYTOMEL) 5 MCG Tab  Active   methocarbamol (ROBAXIN) 750 MG Tab  Active   Methoxy PEG-Epoetin Beta (MIRCERA) 30 MCG/0.3ML Solution Prefilled Syringe  Active   metoprolol SR (TOPROL XL) 50 MG TABLET SR 24 HR  Active   morphine ER (MS CONTIN) 15 MG Tab CR tablet  Active   oxyCODONE immediate release (ROXICODONE) 10 MG immediate release tablet  Active   pantoprazole (PROTONIX) 40 MG Tablet Delayed Response  Active   riFAMPin (RIFADINE) 300 MG Cap  Active   senna-docusate (SENOKOT S) 8.6-50 MG Tab  Active   sevelamer  "(RENAGEL) 800 MG Tab  Active   SYNTHROID 125 MCG Tab  Active   torsemide (DEMADEX) 100 MG Tab  Active   traZODone (DESYREL) 150 MG Tab  Active   ursodiol (ACTIGALL) 300 MG Cap  Active   venlafaxine (EFFEXOR-XR) 150 MG extended-release capsule  Active   vitamin D3 (CHOLECALCIFEROL) 1000 Unit (25 mcg) Tab  Active                  Audit from Redirected Encounters    **Home medications have not yet been reviewed for this encounter**         ALLERGIES  Allergies   Allergen Reactions    Tape Unspecified and Rash     Blisters, paper tape is ok  Other reaction(s): Rash       PHYSICAL EXAM  VITAL SIGNS: BP (!) 158/82   Pulse 72   Temp 36.1 °C (97 °F) (Temporal)   Resp 16   Ht 1.676 m (5' 6\")   Wt 56.2 kg (124 lb)   LMP 04/29/2005 (LMP Unknown)   SpO2 (!) 87%   BMI 20.01 kg/m²    General: Sitting in stretcher comfortably, no distress  HEENT: Moist mucous membranes, normal conjunctiva  CV: Regular rate and rhythm, no murmurs  Pulmonary: Breathing comfortably on room air with clear breath sounds  Chest: TDC present in the right upper chest, dressing and gauze is saturated with blood.  Upon taking dressing down, skin around the insertion site is intact without erythema or ecchymosis or hematoma, nontender around the area, continuous slow ooze of bright red blood from the insertion site    EKG/LABS  CMP no uremia BUN is 18.  Electrolytes overall reassuring mild hyponatremia 134.  Stable elevation in liver enzymes and relatively stable alk phos.  CBC mild leukocytosis 11.2.  Mild anemia hemoglobin 11.1.  No thrombocytopenia.  INR 0.81 PTT 29.      COURSE & MEDICAL DECISION MAKING    ASSESSMENT, COURSE AND PLAN  Care Narrative: Differential includes vascular injury,  dislodged catheter, coagulopathy, thrombocytopenia, uremia    On arrival patient is well-appearing, noted to be slightly hypoxic initially, hemodynamically stable, slightly hypertensive.  Lungs are clear she is denying any cardiopulmonary symptoms at this time, " no chest pain.  Dialysis catheter seems to be working well at dialysis today.  Suspect she may have had some mild trauma to the area that causes slow leak near the insertion site into the internal jugular.  Will get labs to assess for coagulopathy or thrombocytopenia and also anemia.  There does not appear to be any infection or large hematoma around the insertion site.  We did wrap Surgicel around the insertion site give 1 g IV TXA which did stop the bleeding.  No thrombocytopenia, no coagulopathy noted on labs.  Chest x-ray done which showed no evidence of obvious complication with the lung, no pneumothorax.  Oxygen saturations have been normal on room air since initially triage.  Surgicel removed and there was no oozing of blood.  Thus the nursing placed a new sterile dressing.  Patient felt comfortable being discharged home to continue monitoring.  Return precautions provided discharged home in stable condition      DISPOSITION AND DISCUSSIONS    Escalation of care considered, and ultimately not performed: Interventional radiology consultation however bleeding stopped with the above interventions    Barriers to care at this time, including but not limited to: None.     Decision tools and prescription drugs considered including, but not limited to: N/A.    FINAL DIAGNOSIS  1. Bleeding due to dialysis catheter placement, initial encounter (HCC)         Electronically signed by: Pieter Schmid M.D., 2/7/2025 8:38 PM

## 2025-02-08 NOTE — DISCHARGE INSTRUCTIONS
The medications you received today should help stabilize the bleeding over the weekend.  However if the bleeding starts to return and get back to how bad it was today please return to the ER, if you are able to you may need to go to [facility] as they have interventional radiology there should you need a procedure done on the catheter.  Otherwise follow-up on Monday at your next scheduled dialysis session.

## 2025-02-08 NOTE — ED TRIAGE NOTES
"BP (!) 158/82   Pulse 72   Temp 36.1 °C (97 °F) (Temporal)   Resp 16   Ht 1.676 m (5' 6\")   Wt 56.2 kg (124 lb)   LMP 04/29/2005 (LMP Unknown)   SpO2 (!) 87%   BMI 20.01 kg/m²   Chief Complaint   Patient presents with    Bleeding/Bruising     Dialysis cath that is in her R side chest area was accessed today   noticed bleeding on dressing   sent here for evaluation of such       Comes in w/ friend   was sent here from dialysis for evaluation of bleeding from cath site R chest wall  this is a new dialysis port per pt    "

## 2025-02-08 NOTE — ED NOTES
Bleeding is controlled at this time following dressing with surgicell, and TXA administration complete. Pt aware to use call light should any bleeding return.  VSS

## 2025-02-08 NOTE — ED NOTES
Contacted Charge RN at [facility] per RN since the pt is having active bleeding, we can take the dressing down and then change dressing like a central line. ERP and this RN at bedside to take dressing down pt is actively bleeding at the insertion site of dialysis cath. Surgacel applied applied and direct pressure applied. TXA ordered, pt medicated per MAR

## 2025-02-08 NOTE — Clinical Note
"Primary DX/skilled need: Bacterimia,Osteomyelitis, Diabetic ulcers. Resumed patient for [facility] services.  SN to observe, assess,teach,train and monitor medications and disease conditions  SN Frequencies:2w4  Zip Code:[address]  Disciplines ordered: SN,PT  Insurance Authorization: [facility]  Certification Period: 1/12/25-3/12/25  Special Considerations:Pt BG level was 477 via Freestyle rylie device; patient gave herself insulin per her SS, after 1 hr, BG reading\"high\" which means greater than 400 per patient. Pt's brother present; reviewed symptons hyperglycemia to monitor.Pt states \" it's like this a lot when this SN encouraged  consider ER if results remain high. Pt's family were goinf to stay with patient to monitor."

## 2025-02-10 NOTE — PROGRESS NOTES
Medication chart review for [facility] services    Received referral from [facility].   Medications reviewed  compared with discharge summary if available.  Discharge summary date, if applicable:   2/25    Current medication list per [facility]     Medication list one, patient is currently taking    Current Outpatient Medications:     morphine ER, 15 mg, Oral, Q12HRS    traZODone, 150 mg, Oral, QHS    venlafaxine, 150 mg, Oral, DAILY    metoprolol SR, 50 mg, Oral, Q EVENING    torsemide, 100 mg, Oral, DAILY    methocarbamol, 750 mg, Oral, TID    busPIRone, 5 mg, Oral, BID    liothyronine, 5 mcg, Oral, DAILY    riFAMPin, 300 mg, Oral, TID    Mircera, 13 Units, Injection, Q 4 Weeks    Lantus SoloStar, 5 Units, Subcutaneous, Q EVENING    Insulin Pen Needle 32 G x 4 mm, Use one pen needle in pen device to inject insulin once daily.    cyanocobalamin, 1,000 mcg, Oral, DAILY    ursodiol, 300 mg, Oral, TID    vitamin D3, 1,000 Units, Oral, BID    insulin lispro, 0-9 Units, Subcutaneous, TID AC    sevelamer, 800 mg, Oral, TID WITH MEALS    pantoprazole, 40 mg, Oral, DAILY    atorvastatin, 40 mg, Oral, Q EVENING    lidocaine-prilocaine, 1 g, Topical, PRN    gabapentin, 100 mg, Oral, MO, WE + FR    FreeStyle John 3 Plus Sensor, 1 Each, Does not apply, Q14 DAYS    Synthroid, 250 mcg, Oral, AM ES    amLODIPine, 10 mg, Oral, DAILY    senna-docusate, 1 Tablet, Oral, DAILY      Medication list two, drugs that the patient has been prescribed or recommended to take by their healthcare provider on discharge summary          MEDICATIONS ON DISCHARGE      Medication List          START taking these medications         Instructions   busPIRone 5 MG tablet  Commonly known as: Buspar    Take 1 Tablet by mouth 2 times a day.  Dose: 5 mg      morphine ER 15 MG Tbcr tablet  Commonly known as: Ms Contin    Take 1 Tablet by mouth every 12 hours for 7 days.  Dose: 15 mg      torsemide 100 MG Tabs  Commonly known as: Demadex    Take 1  Tablet by mouth every day.  Dose: 100 mg                CHANGE how you take these medications         Instructions   Lantus SoloStar 100 UNIT/ML Sopn injection  What changed: how much to take  Generic drug: insulin glargine    Inject 5 Units under the skin every evening.  Dose: 5 Units      methocarbamol 750 MG Tabs  What changed:   when to take this  reasons to take this  Commonly known as: Robaxin    Take 1 Tablet by mouth 3 times a day. Indications: Musculoskeletal Pain  Dose: 750 mg      metoprolol SR 50 MG Tb24  What changed:   medication strength  how much to take  Commonly known as: Toprol XL    Take 1 Tablet by mouth every evening. Indications: Atrial Fibrillation  Dose: 50 mg      oxyCODONE immediate release 10 MG immediate release tablet  What changed: reasons to take this  Commonly known as: Roxicodone    Take 1 Tablet by mouth every four hours as needed for Severe Pain for up to 5 days.  Dose: 10 mg                CONTINUE taking these medications         Instructions   amLODIPine 10 MG Tabs  Commonly known as: Norvasc    Take 10 mg by mouth every day.     Indications: High Blood Pressure  Dose: 10 mg      atorvastatin 40 MG Tabs  Commonly known as: Lipitor    TAKE 1 TABLET BY MOUTH EVERY DAY IN THE EVENING  Dose: 40 mg      BD Pen Needle Ann Marie U/F  Generic drug: Insulin Pen Needle 32 G x 4 mm    Doctor's comments: Per patient/formulary preference. ICD-10 code: E10.65 - Uncontrolled type 1 Diabetes Mellitus  Use one pen needle in pen device to inject insulin once daily.      cyanocobalamin 500 MCG Tabs  Commonly known as: Vitamin B-12    Take 500 mcg by mouth every day. Indications: Inadequate Vitamin B12  Dose: 500 mcg      FreeStyle John 3 Plus Sensor Misc    1 Each every 14 days.  Dose: 1 Each      gabapentin 100 MG Caps  Commonly known as: Neurontin    Doctor's comments: Take immediately after dialysis  Take 1 Capsule by mouth every Monday, Wednesday, and Friday.  Dose: 100 mg      HumaLOG KwikPen  100 UNIT/ML Sopn injection PEN  Generic drug: insulin lispro    Inject 0-9 Units under the skin 3 times a day before meals. 70 - 150 mg/dL = 0 Units 151 - 200 mg/dL = 2 Units 201 - 250 mg/dL = 3 Units 251 - 300 mg/dL = 5 Units 301 - 350 mg/dL = 6 Units 351 - 400 mg/dL = 8 Units Over 400 mg/dL = 9 Units  Dose: 0-9 Units      lidocaine-prilocaine 2.5-2.5 % Crea  Commonly known as: Emla    Apply 1 g topically as needed (AVF prior to dialysis). APPLY TO AFFECTED AREA AVF ACCESS 30-60 MINS PRIOR TO DIALYSIS TREATMENT WRAP IN SARAN WRAP       Indications: Dialysis  Dose: 1 g      liothyronine 5 MCG Tabs  Commonly known as: Cytomel    TAKE 1 TABLET BY MOUTH EVERY DAY  Dose: 5 mcg      Mircera 30 MCG/0.3ML Sosy  Generic drug: Methoxy PEG-Epoetin Beta    Inject 13 Units as directed every 4 weeks.  Dose: 13 Units      pantoprazole 40 MG Tbec  Commonly known as: Protonix    Take 1 Tablet by mouth every day.  Dose: 40 mg      riFAMPin 300 MG Caps  Commonly known as: Rifadine    Take 300 mg by mouth 3 times a day.  Dose: 300 mg      Senokot S 8.6-50 MG Tabs  Generic drug: senna-docusate    Take 1 Tablet by mouth every day.   Dose: 1 Tablet      sevelamer 800 MG Tabs  Commonly known as: Renagel    Take 800 mg by mouth 3 times a day with meals. Indications: High Amount of Phosphate in the Blood  Dose: 800 mg      Synthroid 125 MCG Tabs  Generic drug: levothyroxine    Take 2 Tablets by mouth every morning on an empty stomach.  Dose: 250 mcg      traZODone 150 MG Tabs  Commonly known as: Desyrel    Take 1 Tablet by mouth at bedtime.  Dose: 150 mg      ursodiol 300 MG Caps  Commonly known as: Actigall    Take 300 mg by mouth 3 times a day. Indications: Liver Disease  Dose: 300 mg      venlafaxine 150 MG extended-release capsule  Commonly known as: Effexor-XR    Take 1 Capsule by mouth every day.  Dose: 150 mg      vitamin D3 1000 Unit (25 mcg) Tabs  Commonly known as: Cholecalciferol    Take 1,000 Units by mouth 2 times a day.  Indications: Osteoporosis  Dose: 1,000 Units                STOP taking these medications       cyclobenzaprine 10 mg Tabs  Commonly known as: Flexeril      dexamethasone 4 MG Tabs  Commonly known as: Decadron      DULoxetine 30 MG Cpep  Commonly known as: Cymbalta      famotidine 20 MG Tabs  Commonly known as: Pepcid      omeprazole 20 MG delayed-release capsule  Commonly known as: PriLOSEC          Allergies   Allergen Reactions    Tape Unspecified and Rash     Blisters, paper tape is ok  Other reaction(s): Rash       Labs     Lab Results   Component Value Date/Time    SODIUM 134 (L) 02/07/2025 09:52 PM    POTASSIUM 4.2 02/07/2025 09:52 PM    CHLORIDE 98 02/07/2025 09:52 PM    CO2 25 02/07/2025 09:52 PM    GLUCOSE 224 (H) 02/07/2025 09:52 PM    BUN 18 02/07/2025 09:52 PM    CREATININE 1.98 (H) 02/07/2025 09:52 PM    CREATININE 1.0 01/08/2009 04:31 PM     Lab Results   Component Value Date/Time    ALKPHOSPHAT 1663 (H) 02/07/2025 09:52 PM    ASTSGOT 177 (H) 02/07/2025 09:52 PM    ALTSGPT 70 (H) 02/07/2025 09:52 PM    TBILIRUBIN 0.5 02/07/2025 09:52 PM    INR 0.81 (L) 02/07/2025 09:21 PM    ALBUMIN 3.3 02/07/2025 09:52 PM    ALBUMIN 3.35 (L) 05/09/2023 01:16 PM        Assessment for clinically significant drug interactions, drug omissions/additions, duplicative therapies.            CC   TOMAS MaxwellPKADI  [address]  [facility] [address]  Fax: [phone]    [facility]  Phone [phone] fax [phone]    This note was created using voice recognition software (Dragon). The accuracy of the dictation is limited by the abilities of the software. I have reviewed the note prior to signing, however some errors in grammar and context are still possible. If you have any questions related to this note please do not hesitate to contact our office.

## 2025-02-11 PROBLEM — E11.11 DIABETIC KETOACIDOSIS WITH COMA ASSOCIATED WITH TYPE 2 DIABETES MELLITUS (HCC): Status: ACTIVE | Noted: 2025-01-01

## 2025-02-11 PROBLEM — G93.41 METABOLIC ENCEPHALOPATHY: Status: ACTIVE | Noted: 2025-01-01

## 2025-02-11 PROBLEM — E10.11 DIABETIC KETOACIDOSIS WITH COMA ASSOCIATED WITH TYPE 1 DIABETES MELLITUS (HCC): Status: ACTIVE | Noted: 2025-01-01

## 2025-02-11 NOTE — PROGRESS NOTES
Pt arrived to unit via stretcher at 1412. Pt's family oriented to room, unit, and plan of care. All questions answered at this time. Call light within reach; fall precautions in place.     Hair care, oral care and generalized hygiene provided.    Bereavement tray at bedside.

## 2025-02-11 NOTE — ED NOTES
Med Rec complete per family at bedside   Allergies reviewed  Antibiotics in the past 30 days:yes ([facility] @ Dialysis)  Anticoagulant in past 14 days:no  Pharmacy patient utilizes:[facility] on [address] [facility]    Family member unable to verify when patient last took medications. Family member believes patient has been taking Chava more regularly for her nerve pain instead of 3x/week    Family member at bedside states patient is not taking Morphine but Oxycodone    Verified Roberta with staff at [facility] on [address] ([phone]) included on med rec

## 2025-02-11 NOTE — ED NOTES
Assist RN: ERP notified of pt temp. Warmed IVF and bear hugger applied per ERP. IVF connected to pressure bag.

## 2025-02-11 NOTE — H&P
Hospital Medicine History & Physical Note    Date of Service  2/11/2025    Primary Care Physician  PAM Maxwell.    Consultants  None    Code Status  Comfort Care/DNR    Chief Complaint  Chief Complaint   Patient presents with    ALOC    Elevated Glucose     559       History of Presenting Illness  Anu Manuel is a 69 y.o. female with vertebral osteomyelitis and bacteremia, type 1 diabetes mellitus, ESRD on hemodialysis, who presented 2/11/2025 after she was found down in the bathroom by her family.  She had erratic breathing, incontinent of stool and was unresponsive and thus she was immediately brought to the ED.  Per the family she missed her hemodialysis yesterday.    ED course:  On evaluation, she was unresponsive, hypothermic, and hypotensive.  She was having Kussmaul respirations.  Initial blood workup showed WBC of 19,700, hemoglobin of 8.1, sodium of 130, potassium 6.3, creatinine of 7.01, CO2 of <2, anion gap of 50, lactate of 10.5, and blood glucose of 915.  Patient's prognosis was discussed with the family, and after further goals of care discussion family opted to transition her to comfort care.    I personally reviewed all lab results mentioned above. Prior medical records from this institution and outside facilities were independently reviewed as noted. I also personally reviewed all ER physician and consultant recommendations and plans as documented above. History was independently obtained by myself. I discussed the plan of care with family, bedside RN, charge RN, , and ERP (Dr. Rivera) .    Review of Systems  ROS    Unable to obtain due to unresponsiveness      Past Medical History   has a past medical history of Accidental drug overdose (08/27/2012), Adverse effect of anesthesia, Anemia, Anemia due to chronic kidney disease, on chronic dialysis (HCC) (12/31/2024), Anesthesia, Arrhythmia, Arthritis, Bowel habit changes (More frequent), Breath shortness, Broken  hip (HCC) (04/2024), Cataract (2022), Cigarette smoker (quit 2013), Dental disorder, depression (08/30/2016), Diabetes mellitus type 1 (HCC) (1989), Dialysis patient (HCC) (Short time due to a kidney injury from a infection), Emphysema of lung (HCC), Encounter for long-term (current) use of insulin (HCC) (09/25/2013), GI bleed, Heart burn, High cholesterol, Hypertension, Hypothyroidism, postsurgical (1970), Indigestion, Infectious disease, Jaundice (2021 Liver disease), Joint replacement, Liver disease, Liver failure (HCC), Myocardial infarct (HCC) (2019), Pain, Polyneuropathy in diabetes(357.2) (06/10/2015), Renal disorder, Status post appendectomy, 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    Surgical History   has a past surgical history that includes hysterectomy, vaginal (2006); thyroid lobectomy (1973); lumpectomy (1976, 2005); cervical disk and fusion anterior (03/12/2008); tonsillectomy (1963); cervical fusion posterior (01/16/2009); hardware removal neuro (01/16/2009); neck exploration (01/16/2009); lumpectomy; lumbar laminectomy diskectomy (Right, 05/10/2016); shoulder decompression arthroscopic (06/17/2008); clavicle distal excision (06/17/2008); shoulder arthroscopy w/ rotator cuff repair (10/09/2008); pr njx aa&/strd tfrml epi lumbar/sacral 1 level (Right, 08/31/2016); spinal cord stimulator (N/A, 10/26/2018); thoracic laminectomy (N/A, 10/26/2018); appendectomy (2004); pr ins/rplc spi npg/rcvr pocket (N/A, 12/16/2019); irrigation & debridement neuro (01/19/2020); cath placement (01/25/2020); pr ercp,diagnostic (N/A, 10/26/2021); pr upper gi endoscopy,biopsy (N/A, 10/26/2021); pr upper gi endoscopy,diagnosis (N/A, 10/26/2021); peter by laparoscopy (N/A, 10/28/2021); pr ercp,diagnostic (N/A, 01/14/2022); other cardiac surgery (2020 stents inserted); other abdominal surgery (2004); pr lap insert intraperitoneal catheter  "(2024); cataract phaco with iol; pb remove perm cannula/catheter (2024); av fistula creation (Left, 2024); and av fistula revision (Left, 2024).     Family History  family history includes Cancer in her father; Hypertension in her mother.     Social History   reports that she has been smoking cigarettes. She has a 15 pack-year smoking history. She has never used smokeless tobacco. She reports that she does not currently use alcohol. She reports that she does not currently use drugs after having used the following drugs: Inhaled, Oral, and Marijuana.    Allergies  Allergies   Allergen Reactions    Tape Unspecified and Rash     Blisters, paper tape is ok  Other reaction(s): Rash       Medications  Prior to Admission Medications   Prescriptions Last Dose Informant Patient Reported? Taking?   Continuous Glucose Sensor (FREESTYLE PARISA 3 PLUS SENSOR) [facility] Family Member No No   Si Each every 14 days.   Insulin Pen Needle 32 G x 4 mm  Family Member No No   Sig: Use one pen needle in pen device to inject insulin once daily.   Methoxy PEG-Epoetin Beta (MIRCERA) 30 MCG/0.3ML Solution Prefilled Syringe 2024 Other Facility, Family Member Yes No   Sig: Inject 30 mcg as directed every 4 weeks. Indications: Anemia associated with Chronic Kidney Failure   SYNTHROID 125 MCG Tab Unknown Family Member No No   Sig: Take 2 Tablets by mouth every morning on an empty stomach. Please make a follow up appointment and have thyroid lab work done  Indications: Underactive Thyroid   VANCOMYCIN HCL IV 2025 Other Facility, Family Member Yes Yes   Sig: Infuse 1,000 mg into a venous catheter 3 times a week. \"[facility] [phone]\"   amLODIPine (NORVASC) 10 MG Tab Unknown Family Member Yes No   Sig: Take 10 mg by mouth every day.     Indications: High Blood Pressure   atorvastatin (LIPITOR) 40 MG Tab Unknown Family Member No No   Sig: TAKE 1 TABLET BY MOUTH EVERY DAY IN THE EVENING   busPIRone (BUSPAR) 5 " MG tablet Unknown Family Member No No   Sig: Take 1 Tablet by mouth 2 times a day.   cyanocobalamin (VITAMIN B-12) 500 MCG Tab Unknown Family Member Yes No   Sig: Take 1,000 mcg by mouth every day. Indications: Inadequate Vitamin B12   famotidine (PEPCID) 20 MG Tab Unknown Family Member Yes No   Sig: Take 20 mg by mouth 2 times a day.   gabapentin (NEURONTIN) 100 MG Cap Unknown Family Member No No   Sig: Take 1 Capsule by mouth every Monday, Wednesday, and Friday.   insulin glargine (LANTUS SOLOSTAR) 100 UNIT/ML Solution Pen-injector injection Unknown Family Member No No   Sig: Inject 5 Units under the skin every evening.   insulin lispro 100 UNIT/ML SC SOPN injection PEN Unknown Family Member No No   Sig: Inject 0-9 Units under the skin 3 times a day before meals. 70 - 150 mg/dL = 0 Units 151 - 200 mg/dL = 2 Units 201 - 250 mg/dL = 3 Units 251 - 300 mg/dL = 5 Units 301 - 350 mg/dL = 6 Units 351 - 400 mg/dL = 8 Units Over 400 mg/dL = 9 Units   lidocaine-prilocaine (EMLA) 2.5-2.5 % Cream Unknown Family Member Yes No   Sig: Apply 1 g topically as needed (AVF prior to dialysis). APPLY TO AFFECTED AREA AVF ACCESS 30-60 MINS PRIOR TO DIALYSIS TREATMENT WRAP IN SARAN WRAP      Indications: Dialysis   liothyronine (CYTOMEL) 5 MCG Tab Unknown Family Member No No   Sig: TAKE 1 TABLET BY MOUTH EVERY DAY   methocarbamol (ROBAXIN) 750 MG Tab Unknown Family Member No No   Sig: Take 1 Tablet by mouth 3 times a day. Indications: Musculoskeletal Pain   metoprolol SR (TOPROL XL) 50 MG TABLET SR 24 HR Unknown Family Member No No   Sig: Take 1 Tablet by mouth every evening. Indications: Atrial Fibrillation   morphine ER (MS CONTIN) 15 MG Tab CR tablet Not Taking Family Member No No   Sig: Take 1 Tablet by mouth every 12 hours for 7 days.   Patient not taking: Reported on 2/11/2025   oxyCODONE immediate release (ROXICODONE) 10 MG immediate release tablet Unknown Family Member Yes No   Sig: Take 10 mg by mouth every four hours as needed  for Moderate Pain.   pantoprazole (PROTONIX) 40 MG Tablet Delayed Response Unknown Family Member No No   Sig: Take 1 Tablet by mouth every day.   riFAMPin (RIFADINE) 300 MG Cap Unknown Family Member Yes No   Sig: Take 300 mg by mouth 3 times a day. Indications: infection   senna-docusate (SENOKOT S) 8.6-50 MG Tab Unknown Family Member Yes No   Sig: Take 1 Tablet by mouth every day.          Indications: Constipation   sevelamer (RENAGEL) 800 MG Tab Unknown Family Member Yes No   Sig: Take 800 mg by mouth 3 times a day with meals. Indications: High Amount of Phosphate in the Blood   torsemide (DEMADEX) 100 MG Tab Unknown Family Member No No   Sig: Take 1 Tablet by mouth every day.   traZODone (DESYREL) 150 MG Tab Unknown Family Member No No   Sig: Take 1 Tablet by mouth at bedtime.   ursodiol (ACTIGALL) 300 MG Cap Unknown Family Member Yes No   Sig: Take 300 mg by mouth 3 times a day. Indications: Liver Disease   venlafaxine (EFFEXOR-XR) 150 MG extended-release capsule Unknown Family Member No No   Sig: Take 1 Capsule by mouth every day.   vitamin D3 (CHOLECALCIFEROL) 1000 Unit (25 mcg) Tab Unknown Historical, Family Member Yes No   Sig: Take 1,000 Units by mouth 2 times a day. Indications: Osteoporosis      Facility-Administered Medications: None       Physical Exam  Temp:  [30.2 °C (86.4 °F)] 30.2 °C (86.4 °F)  Pulse:  [38-64] 44  Resp:  [12-22] 14  BP: (73-87)/(35-60) 73/35  SpO2:  [91 %-100 %] 92 %  Blood Pressure : (!) 73/35   Temperature: (!) 30.2 °C (86.4 °F)   Pulse: (!) 44   Respiration: 14   Pulse Oximetry: 92 %       Physical Exam  Vitals reviewed.   Constitutional:       General: She is not in acute distress.     Appearance: She is normal weight. She is ill-appearing. She is not diaphoretic.   HENT:      Head: Normocephalic and atraumatic.      Right Ear: External ear normal.      Left Ear: External ear normal.      Mouth/Throat:      Mouth: Mucous membranes are dry.      Pharynx: No oropharyngeal exudate  "or posterior oropharyngeal erythema.   Eyes:      General: No scleral icterus.     Extraocular Movements: Extraocular movements intact.      Conjunctiva/sclera: Conjunctivae normal.      Pupils: Pupils are equal, round, and reactive to light.   Cardiovascular:      Rate and Rhythm: Regular rhythm. Bradycardia present.      Heart sounds: Normal heart sounds. No murmur heard.  Pulmonary:      Effort: Pulmonary effort is normal. No respiratory distress.      Breath sounds: Normal breath sounds. No stridor. No wheezing, rhonchi or rales.   Chest:      Chest wall: No tenderness.   Abdominal:      General: Bowel sounds are normal. There is no distension.      Palpations: Abdomen is soft. There is no mass.      Tenderness: There is no abdominal tenderness. There is no guarding or rebound.   Musculoskeletal:         General: No swelling. Normal range of motion.      Cervical back: Normal range of motion and neck supple. No rigidity. No muscular tenderness.      Right lower leg: No edema.      Left lower leg: No edema.   Lymphadenopathy:      Cervical: No cervical adenopathy.   Skin:     General: Skin is dry.      Coloration: Skin is not jaundiced.      Findings: No rash.      Comments: Dusky skin   Neurological:      Cranial Nerves: No cranial nerve deficit.      Comments: Unresponsive   Psychiatric:      Comments: Unable to assess due to unresponsiveness         Laboratory:  Recent Labs     02/11/25  1029   WBC 19.7*   RBC 2.54*   HEMOGLOBIN 8.1*   HEMATOCRIT 29.0*   .2*   MCH 31.9   MCHC 27.9*   RDW 69.3*   PLATELETCT 426   MPV 11.0     Recent Labs     02/11/25  1029   SODIUM 130*   POTASSIUM 6.3*   CHLORIDE 78*   CO2 <2*   GLUCOSE 915*   *   CREATININE 7.01*   CALCIUM 8.3*     Recent Labs     02/11/25  1029   ALTSGPT 63*   ASTSGOT 74*   ALKPHOSPHAT 1115*   TBILIRUBIN 0.4   GLUCOSE 915*     Recent Labs     02/11/25  1029   INR 1.54*     No results for input(s): \"NTPROBNP\" in the last 72 hours.      No " "results for input(s): \"TROPONINT\" in the last 72 hours.    Imaging:  DX-CHEST-PORTABLE (1 VIEW)   Final Result      1. Interstitial opacities in the lungs could represent edema or atypical pneumonitis.   2. The remainder is stable.          Imaging studies and EKG results were independently reviewed as above.      Assessment/Plan:  Justification for Admission Status  I anticipate this patient is appropriate for observation status at this time because of ongoing need for comfort measures.    Principal Problem:    Diabetic ketoacidosis with coma associated with type 1 diabetes mellitus (HCC) (POA: Yes)  Active Problems:    Metabolic encephalopathy (POA: Yes)    Hyponatremia (POA: Yes)    High anion gap metabolic acidosis (POA: Yes)    ESRD needing dialysis (HCC) (POA: Yes)    Hyperkalemia (POA: Yes)  Resolved Problems:    * No resolved hospital problems. *    -Patient is moribund, very poor prognosis, mortality anticipated in the next few hours.  -Family has decided to transition her to comfort care.  -All non-comfort related medications and interventions will be discontinued  -Will place her on as needed Roxanol and IV morphine for pain, p.o./IV Ativan for agitation, atropine and Robinul for secretions.  -Provide emotional spiritual support to the family.        VTE prophylaxis: Not indicated, comfort care  "

## 2025-02-11 NOTE — ED NOTES
Pt restless frequently moving and dislodged IV to right arm. Pt noted to be maría 41. Erp and family at bedside discussing plan of care, with agreement pt made comfort care. IV placed to left upper arm family at bedside

## 2025-02-11 NOTE — ED NOTES
Temp Jacob placed, additional warm blankets applied.   Family to bedside and updated to poc Daughter Farzaneh has pts ayush ring.

## 2025-02-11 NOTE — ED TRIAGE NOTES
.  Chief Complaint   Patient presents with    ALOC    Elevated Glucose     559     Biba from home pt found on bathroom floor by HH RN. Per family last seen yesterday missed dialysis. Pt with kussmaul respirations glucose 559. Incontinent of stool. Cool skin unable to obtain accurate temp at is time. POLST at bedside

## 2025-02-11 NOTE — ED PROVIDER NOTES
ED Provider Note    CHIEF COMPLAINT  Chief Complaint   Patient presents with    ALOC    Elevated Glucose     559       EXTERNAL RECORDS REVIEWED  Discharge summary completed on  HPI & HOSPITAL COURSE  This is a 69 y.o. female here with severe bilateral upper extremity pain, found to have bacteremia and C4-C7 vertebral osteomyelitis, presence of hardware.  Anu Manuel is a 69 y.o. female with past medical history of insulin-dependent diabetes mellitus, ESRD on dialysis, CAD, recent admission for acute on chronic back pain who presented 1/22/2025 with bilateral arm pain.  MRI of the cervical spine from 1/8/2025 revealed abnormal bone marrow signal with raising the possibility of infection and short-term follow-up imaging was recommended.  MRI C-spine is ordered  for further evaluation.  Nephrology recommending no further dialysis as patient's kidney function has improved and has no uremic symptoms.  Pending MRI C-spine for further evaluation.     Vertebral osteomyelitis, presence of hardware, we will presume the infection is due to E faecium is 2 of 4 bottles positive.  However, patient with dialysis line and possibly infected thrombus and with hardware in her spine so there is also a risk that the Staph epidermidis is the infectious agent - will treat both   -IR and Neurosurgery declined biopsy, positive blood cx  - Prior fusion of C4-C7 w/ inserts, posterior fusion hardware C4-C7  -Repeat MRI C-spine with C7-T1 discitis and osteomyelitis. No significant epidural phlegmon or epidural abscess pocket.   Bacteremia   -Blood cult on 1/27 2:4 bottles +E faecium &  1:4 bottles +staph epi   -Blood cult on 1/28 & 1/29 -no growth and final  Possible endocarditis, thrombus, concern for infected clot associated with her catheter line  -TTE with vegetations TV and MV  -RAFIQ equivocal   Sclerotic valves with mitral annular calcifications, but no obvious valvular vegetation.   There is a irregular echogenic density  adherent to the right atrial posterior wall by IVC entry with mobile linear density off the density that can be either infections or thrombus.  Does not appear attached to the catheter tip, but cannot completely rule out.   ESRD, on dialysis 2 times weekly  -HD line removed on 2/1     PLAN:   --- Continue vancomycin with pharmacy to dose to treat the E faecium as well as the CoNS bacteremia, VOM and potentially infected thrombus while inpatient.  On discharge will continue vancomycin via dialysis and will need to be ordered by nephrology.  --- Repeat blood culture, ordered today-if any further positive blood cultures, please notify ID  --- Prior blood cultures are no growth and final, if repeat cultures negative at 24 hours okay to replace HD catheter  --- Recommend 6 weeks of IV antibiotics from date of catheter removal, end 3/15/25 -limited antibiotic options particular for the Enterococcus faecium so will likely stop antibiotics and monitor at that point if she is doing well and labs unremarkable.  If concern for ongoing infection we would then order a repeat MRI and extend the antibiotics.  --- ESR and CRP ordered for trending purposes  --- Will need at least weekly labs while on antibiotics, CBC, CMP and Vanco trough, vancomycin will need to be managed and ordered by nephrology  --- Recommend 3 times weekly dialysis while on vancomycin     Follow-up in ID clinic in 2 weeks and then prior to stopping antibiotic     2/5: Patient had reinsertion of HD catheter on 2/4/2025 right chest without complication.  She will start MWF HD with vancomycin 1000mg IV MWF post HD until 3/15/25.    She is eating, on RA, ambulating well and wanting to go home.   Her pain has much improved since admission, no longer c/o radicular arm pain.  Confirmed with nephrologist Dr. Anderson, vancomycin ordered to HD clinic.  Patient states she is in need of her oxycontin long acting pain medication that was started this hospitalization in  addition to short acting oxycodone.  She is in tears on exam.  1 week ordered with pain management referral placed for 1 week.  Follow up with psychiatry, referral placed.  Continued all her medications.   Of note, patient states she has autoimmune hepatitis for last 4 years, she is s/p cholecystectomy remote, no liver pain.    Follow up with her PCP and or GI.     HPI/ROS    OUTSIDE HISTORIAN(S):  Since found is at bedside states she has a very poor quality life prior to this and believe that she would benefit from comfort care.  They have discussed this in the past.    Anu Manuel is a 69 y.o. female who presents via EMS.  The patient was found in bathroom on the ground.  She was last seen yesterday and she missed dialysis yesterday.  She has a significant past medical history of bacteremia, osteomyelitis of the cervical spine, possible endocarditis.  She is on persistent IV vancomycin.  Per EMS, patient elevated blood sugar in the 500 range, Kussmaul's respirations.  The patient does have a POLST form that is DO NOT INTUBATE, DO NOT RESUSCITATE.    PAST MEDICAL HISTORY   has a past medical history of Accidental drug overdose (08/27/2012), Adverse effect of anesthesia, Anemia, Anemia due to chronic kidney disease, on chronic dialysis ([facility]) (12/31/2024), Anesthesia, Arrhythmia, Arthritis, Bowel habit changes (More frequent), Breath shortness, Broken hip ([facility]) (04/2024), Cataract (2022), Cigarette smoker (quit 2013), Dental disorder, depression (08/30/2016), Diabetes mellitus type 1 ([facility]) (1989), Dialysis patient ([facility]) (Short time due to a kidney injury from a infection), Emphysema of lung ([facility]), Encounter for long-term (current) use of insulin ([facility]) (09/25/2013), GI bleed, Heart burn, High cholesterol, Hypertension, Hypothyroidism, postsurgical (1970), Indigestion, Infectious disease, Jaundice (2021 Liver disease), Joint replacement, Liver disease, Liver failure (HCC), Myocardial infarct ([facility]) (2019),  Pain, Polyneuropathy in diabetes(357.2) (06/10/2015), Renal disorder, Status post appendectomy, 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    SURGICAL HISTORY   has a past surgical history that includes hysterectomy, vaginal (2006); thyroid lobectomy (1973); lumpectomy (1976, 2005); cervical disk and fusion anterior (03/12/2008); tonsillectomy (1963); cervical fusion posterior (01/16/2009); hardware removal neuro (01/16/2009); neck exploration (01/16/2009); lumpectomy; lumbar laminectomy diskectomy (Right, 05/10/2016); shoulder decompression arthroscopic (06/17/2008); clavicle distal excision (06/17/2008); shoulder arthroscopy w/ rotator cuff repair (10/09/2008); njx aa&/strd tfrml epi lumbar/sacral 1 level (Right, 08/31/2016); spinal cord stimulator (N/A, 10/26/2018); thoracic laminectomy (N/A, 10/26/2018); appendectomy (2004); ins/rplc spi npg/rcvr pocket (N/A, 12/16/2019); irrigation & debridement neuro (01/19/2020); cath placement (01/25/2020); ercp,diagnostic (N/A, 10/26/2021); upper gi endoscopy,biopsy (N/A, 10/26/2021); upper gi endoscopy,diagnosis (N/A, 10/26/2021); peter by laparoscopy (N/A, 10/28/2021); ercp,diagnostic (N/A, 01/14/2022); other cardiac surgery (2020 stents inserted); other abdominal surgery (2004); lap insert intraperitoneal catheter (06/20/2024); cataract phaco with iol; pb remove perm cannula/catheter (09/23/2024); av fistula creation (Left, 09/23/2024); and av fistula revision (Left, 12/5/2024).    FAMILY HISTORY  Family History   Problem Relation Age of Onset    Hypertension Mother     Cancer Father        SOCIAL HISTORY  Social History     Tobacco Use    Smoking status: Every Day     Current packs/day: 0.50     Average packs/day: 0.5 packs/day for 30.0 years (15.0 ttl pk-yrs)     Types: Cigarettes    Smokeless tobacco: Never    Tobacco comments:     Currently smokes 1/2 - 3/4 a pack daily.  Has smoked for  about 50 years.   Vaping Use    Vaping status: Never Used   Substance and Sexual Activity    Alcohol use: Not Currently    Drug use: Not Currently     Types: Inhaled, Oral, Marijuana     Comment: Marijuana - Occasional; edibles    Sexual activity: Not Currently       CURRENT MEDICATIONS  Home Medications       Reviewed by Stone Allen (Pharmacy Tech) on 02/11/25 at 1119  Med List Status: Complete     Medication Last Dose Status   amLODIPine (NORVASC) 10 MG Tab Unknown Active   atorvastatin (LIPITOR) 40 MG Tab Unknown Active   busPIRone (BUSPAR) 5 MG tablet Unknown Active   Continuous Glucose Sensor (FREESTYLE PARISA 3 PLUS SENSOR) Misc 2/8/2025 Active   cyanocobalamin (VITAMIN B-12) 500 MCG Tab Unknown Active   gabapentin (NEURONTIN) 100 MG Cap Unknown Active   insulin glargine (LANTUS SOLOSTAR) 100 UNIT/ML Solution Pen-injector injection Unknown Active   insulin lispro 100 UNIT/ML SC SOPN injection PEN Unknown Active   Insulin Pen Needle 32 G x 4 mm  Active   lidocaine-prilocaine (EMLA) 2.5-2.5 % Cream Unknown Active   liothyronine (CYTOMEL) 5 MCG Tab Unknown Active   methocarbamol (ROBAXIN) 750 MG Tab Unknown Active   Methoxy PEG-Epoetin Beta (MIRCERA) 30 MCG/0.3ML Solution Prefilled Syringe 12/13/2024 Active   metoprolol SR (TOPROL XL) 50 MG TABLET SR 24 HR Unknown Active   morphine ER (MS CONTIN) 15 MG Tab CR tablet Not Taking Active   pantoprazole (PROTONIX) 40 MG Tablet Delayed Response Unknown Active   riFAMPin (RIFADINE) 300 MG Cap Unknown Active   senna-docusate (SENOKOT S) 8.6-50 MG Tab Unknown Active   sevelamer (RENAGEL) 800 MG Tab Unknown Active   SYNTHROID 125 MCG Tab Unknown Active   torsemide (DEMADEX) 100 MG Tab Unknown Active   traZODone (DESYREL) 150 MG Tab Unknown Active   ursodiol (ACTIGALL) 300 MG Cap Unknown Active   venlafaxine (EFFEXOR-XR) 150 MG extended-release capsule Unknown Active   vitamin D3 (CHOLECALCIFEROL) 1000 Unit (25 mcg) Tab Unknown Active                  Audit from  Redirected Encounters    **Home medications have not yet been reviewed for this encounter**         ALLERGIES  Allergies   Allergen Reactions    Tape Unspecified and Rash     Blisters, paper tape is ok  Other reaction(s): Rash       PHYSICAL EXAM  VITAL SIGNS: BP (!) 74/39   Pulse (!) 45   Temp (!) 30.2 °C (86.4 °F) (Rectal)   Resp 14   Wt 55.8 kg (123 lb)   LMP 04/29/2005 (LMP Unknown)   SpO2 91%   BMI 19.85 kg/m²      Nursing notes and vitals reviewed.  Constitutional: W moderate distress, responding to painful stimulation only.  Non-toxic appearance.   Eyes: PERRLA, EOMI, Conjunctiva normal, No discharge.   HENT: Normocephalic, Atraumatic, moist mucous membranes, no facial edema   Cardiovascular: Normal heart rate, Normal rhythm, No murmurs, No rubs, No gallops.   Thorax & Lungs: No respiratory distress, No rales, No rhonchi, No wheezing, No chest tenderness, humerus catheter in the right chest wall.   Abdomen: Bowel sounds normal, Soft, No tenderness, No guarding, No rebound, No masses, No pulsatile masses.   Skin: Warm, Dry, No erythema, No rash.   Extremities: No deformity, slight abrasions on the distal aspect of the toes, pelvis stable, moving upper lower EXTR without difficulty   Neurologic: Grimacing and opening eyes on jaw thrust, moving upper lower extremities out difficulty   Psychiatric: Unable to assess      EKG/LABS  Sinus rhythm on monitor  I have independently interpreted this EKG  Results for orders placed or performed during the hospital encounter of 02/11/25   Lactic Acid    Collection Time: 02/11/25 10:29 AM   Result Value Ref Range    Lactic Acid 10.5 (HH) 0.5 - 2.0 mmol/L   CBC with Differential    Collection Time: 02/11/25 10:29 AM   Result Value Ref Range    WBC 19.7 (H) 4.8 - 10.8 K/uL    RBC 2.54 (L) 4.20 - 5.40 M/uL    Hemoglobin 8.1 (L) 12.0 - 16.0 g/dL    Hematocrit 29.0 (L) 37.0 - 47.0 %    .2 (H) 81.4 - 97.8 fL    MCH 31.9 27.0 - 33.0 pg    MCHC 27.9 (L) 32.2 - 35.5  g/dL    RDW 69.3 (H) 35.9 - 50.0 fL    Platelet Count 426 164 - 446 K/uL    MPV 11.0 9.0 - 12.9 fL    Neutrophils-Polys 72.50 (H) 44.00 - 72.00 %    Lymphocytes 8.30 (L) 22.00 - 41.00 %    Monocytes 10.00 0.00 - 13.40 %    Eosinophils 0.00 0.00 - 6.90 %    Basophils 0.00 0.00 - 1.80 %    Nucleated RBC 0.00 0.00 - 0.20 /100 WBC    Neutrophils (Absolute) 14.28 (H) 1.82 - 7.42 K/uL    Lymphs (Absolute) 1.64 1.00 - 4.80 K/uL    Monos (Absolute) 1.97 (H) 0.00 - 0.85 K/uL    Eos (Absolute) 0.00 0.00 - 0.51 K/uL    Baso (Absolute) 0.00 0.00 - 0.12 K/uL    NRBC (Absolute) 0.00 K/uL    Anisocytosis 1+     Macrocytosis 2+ (A)    Complete Metabolic Panel    Collection Time: 02/11/25 10:29 AM   Result Value Ref Range    Sodium 130 (L) 135 - 145 mmol/L    Potassium 6.3 (H) 3.6 - 5.5 mmol/L    Chloride 78 (L) 96 - 112 mmol/L    Co2 <2 (LL) 20 - 33 mmol/L    Anion Gap 50.0 (H) 7.0 - 16.0    Glucose 915 (HH) 65 - 99 mg/dL    Bun 108 (H) 8 - 22 mg/dL    Creatinine 7.01 (HH) 0.50 - 1.40 mg/dL    Calcium 8.3 (L) 8.5 - 10.5 mg/dL    Correct Calcium 8.6 8.5 - 10.5 mg/dL    AST(SGOT) 74 (H) 12 - 45 U/L    ALT(SGPT) 63 (H) 2 - 50 U/L    Alkaline Phosphatase 1115 (H) 30 - 99 U/L    Total Bilirubin 0.4 0.1 - 1.5 mg/dL    Albumin 3.6 3.2 - 4.9 g/dL    Total Protein 7.2 6.0 - 8.2 g/dL    Globulin 3.6 (H) 1.9 - 3.5 g/dL    A-G Ratio 1.0 g/dL   Prothrombin time (INR)    Collection Time: 02/11/25 10:29 AM   Result Value Ref Range    PT 18.6 (H) 12.0 - 14.6 sec    INR 1.54 (H) 0.87 - 1.13   ESTIMATED GFR    Collection Time: 02/11/25 10:29 AM   Result Value Ref Range    GFR (CKD-EPI) 6 (A) >60 mL/min/1.73 m 2   DIFFERENTIAL MANUAL    Collection Time: 02/11/25 10:29 AM   Result Value Ref Range    Metamyelocytes 5.00 %    Myelocytes 4.20 %    Manual Diff Status PERFORMED    PERIPHERAL SMEAR REVIEW    Collection Time: 02/11/25 10:29 AM   Result Value Ref Range    Peripheral Smear Review see below    PLATELET ESTIMATE    Collection Time: 02/11/25  10:29 AM   Result Value Ref Range    Plt Estimation Normal    MORPHOLOGY    Collection Time: 02/11/25 10:29 AM   Result Value Ref Range    RBC Morphology Present     Poikilocytosis 2+     Echinocytes 1+     Toxic Vacuoles Few      *Note: Due to a large number of results and/or encounters for the requested time period, some results have not been displayed. A complete set of results can be found in Results Review.       RADIOLOGY/PROCEDURES   I have independently interpreted the diagnostic imaging associated with this visit and am waiting the final reading from the radiologist.   My preliminary interpretation is as follows: Chest x-ray negative for focal infiltrate    Radiologist interpretation:  DX-CHEST-PORTABLE (1 VIEW)   Final Result      1. Interstitial opacities in the lungs could represent edema or atypical pneumonitis.   2. The remainder is stable.          COURSE & MEDICAL DECISION MAKING    ASSESSMENT, COURSE AND PLAN  Care Narrative: This is a 69-year-old female presents critically ill.  She has a very longstanding history of diabetes, renal failure, bacteremia, cervical osteomyelitis.  She missed dialysis yesterday as found altered.  Here in the emergency department, the patient was found to have profound diabetic ketoacidosis with anion gap of 50, CO2 less than 2, potassium was high at 6.3 as well.  The patient was hypotensive.  IV was started, IV fluids were initiated for resuscitation.  Initially ordered calcium chloride for the elevated potassium was found but after deliberation with the family a long conversation they have decided the patient to be comfort care.  She has had a very poor quality prior to this, she has profound conditioning currently and that is life-threatening and life ending, they state that she did not want to be resuscitated with intubation or CPR and believe that this would be the best option for the patient concerning her previous condition and quality of life.  I do agree with  the decision for comfort care and the POLST form does acknowledge this as well.  Interventions were stopped including IV fluids, calcium and other imaging and.  The patient received Ativan and morphine here in the emergency department and placed on comfort care.  Had a long conversation with the patient's family and they are thinking for this.    CRITICAL CARE  The very real possibilty of a deterioration of this patient's condition required the highest level of my preparedness for sudden, emergent intervention.  I provided critical care services, which included medication orders, frequent reevaluations of the patient's condition and response to treatment, ordering and reviewing test results, and discussing the case with various consultants.  The critical care time associated with the care of the patient was 35 minutes. Review chart for interventions. This time is exclusive of any other billable procedures.         DISPOSITION AND DISCUSSIONS      Escalation of care considered, and ultimately not performed:after discussion with the patient / family, they have elected to decline an escalation in care  The family does agree with comfort care.      FINAL DIAGNOSIS  Diabetic ketoacidosis  Renal failure  Hyperkalemia  Dehydration  Profound acidosis  Altered mental status  [facility]  Critical care time 35 minutes     Electronically signed by: Denny Rivera D.O., 2/11/2025 10:16 AM

## 2025-02-11 NOTE — ED NOTES
"Pt requesting to turn c/o pain to coccyx and know \"bed sore\" pt has orange paste to area and open sore stage 2. Unable to take photo at this time. Pt positioned with wedge on right side. Pure wick placed.   "

## 2025-02-12 ENCOUNTER — HOME CARE VISIT (OUTPATIENT)
Dept: HOME HEALTH SERVICES | Facility: HOME HEALTHCARE | Age: 70
End: 2025-02-12
Payer: MEDICARE

## 2025-02-12 NOTE — CASE COMMUNICATION
"I noticed this case comm was not sent to her provider? Just double check that. Thanks!      ----- Message -----  From: Anu Montes De Oca R.N.  Sent: 2/8/2025   4:56 PM PST  To: Blanca Boston R.N.; Yamini Gutierrez; *      Primary DX/skilled need: Bacterimia,Osteomyelitis, Diabetic ulcers. Resumed patient for R[facility] services.  SN to observe, assess,teach,train and monitor medications and disease conditions  SN Frequencies:2w4  Zip Code :[id number]  Disciplines ordered: SN,PT  Insurance Authorization: [facility]  Certification Period: 1/12/25-3/12/25  Special Considerations:Pt BG level was 477 via Freestyle rylie device; patient gave herself insulin per her SS, after 1 hr, BG reading\"high\" which means greater than 400 per patient. Pt's brother present; reviewed symptons hyperglycemia to monitor.Pt states \" it's like this a lot when this SN encouraged  consider ER if results remain  high. Pt's family were goinf to stay with patient to monitor."

## 2025-02-12 NOTE — DISCHARGE SUMMARY
Death Summary    Cause of Death  Cardiorespiratory arrest due to metabolic acidosis due to diabetic ketoacidosis and lactic acidosis.    Comorbid Conditions at the Time of Death  Principal Problem:    Diabetic ketoacidosis with coma associated with type 1 diabetes mellitus (HCC) (POA: Yes)  Active Problems:    Metabolic encephalopathy (POA: Yes)    Hyponatremia (POA: Yes)    High anion gap metabolic acidosis (POA: Yes)    ESRD needing dialysis (HCC) (POA: Yes)    Hyperkalemia (POA: Yes)  Resolved Problems:    * No resolved hospital problems. *      History of Presenting Illness and Hospital Course  Anu Manuel is a 69 y.o. female with vertebral osteomyelitis and bacteremia, type 1 diabetes mellitus, ESRD on hemodialysis, who presented 2/11/2025 after she was found down in the bathroom by her family.  She had erratic breathing, incontinent of stool and was unresponsive and thus she was immediately brought to the ED.  Per the family she missed her hemodialysis the day prior.     On evaluation, she was unresponsive, hypothermic, and hypotensive.  She was having Kussmaul respirations.  Initial blood workup showed WBC of 19,700, hemoglobin of 8.1, sodium of 130, potassium 6.3, creatinine of 7.01, CO2 of <2, anion gap of 50, lactate of 10.5, and blood glucose of 915.  Patient's prognosis was discussed with the family, and after further goals of care discussion family opted to transition her to comfort care. She was placed on medications with goals to have a comfortable and natural death.     She subsequently passed away peacefully surrounded by family.     Death Date: 02/11/25   Death Time: 1835         Pronounced By (RN1): Maureen STATON  Pronounced By (RN2): Morgan STATON

## 2025-02-12 NOTE — PROGRESS NOTES
TOMIRANDA 1835    10x family members at bedside at time of death. All grieving appropriately. Bereavement tray available at bedside.

## 2025-02-18 ENCOUNTER — HOME CARE VISIT (OUTPATIENT)
Dept: HOME HEALTH SERVICES | Facility: HOME HEALTHCARE | Age: 70
End: 2025-02-18
Payer: MEDICARE

## 2025-02-18 NOTE — CASE COMMUNICATION
I agree with these changes  ----- Message -----  From: Cielo Cardona R.N.  Sent: 2/18/2025   1:11 PM PST  To: Anu Montes De Oca R.N.      Quality Review for [facility] by RODRICK Cardona, MINI on  February 18, 2025    Edits completed by RODRICK Cardona RN:  1.  and  diagnosis coding updated per chart review.  2. Changed  to 2/18/25 per the LSOC order  3. Changed  1 to no, there is no active diagnosis of PVD  4. Ch ecked indication noted for high risk drugs per the MAR

## 2025-02-18 NOTE — CASE COMMUNICATION
Quality Review for [facility] by RODRICK Cardona RN on  February 18, 2025    Edits completed by RODRICK Cardona RN:  1.  and  diagnosis coding updated per chart review.  2. Changed  to 2/18/25 per the LSOC order  3. Changed  1 to no, there is no active diagnosis of PVD  4. Checked indication noted for high risk drugs per the MAR

## 2025-02-19 PROBLEM — N18.4 CKD (CHRONIC KIDNEY DISEASE) STAGE 4, GFR 15-29 ML/MIN (HCC): Status: RESOLVED | Noted: 2025-01-01 | Resolved: 2025-02-19
